# Patient Record
Sex: FEMALE | Race: WHITE | NOT HISPANIC OR LATINO
[De-identification: names, ages, dates, MRNs, and addresses within clinical notes are randomized per-mention and may not be internally consistent; named-entity substitution may affect disease eponyms.]

---

## 2017-01-06 ENCOUNTER — MEDICATION RENEWAL (OUTPATIENT)
Age: 67
End: 2017-01-06

## 2017-02-03 ENCOUNTER — MEDICATION RENEWAL (OUTPATIENT)
Age: 67
End: 2017-02-03

## 2017-03-01 ENCOUNTER — MEDICATION RENEWAL (OUTPATIENT)
Age: 67
End: 2017-03-01

## 2017-04-06 ENCOUNTER — MEDICATION RENEWAL (OUTPATIENT)
Age: 67
End: 2017-04-06

## 2017-04-11 ENCOUNTER — APPOINTMENT (OUTPATIENT)
Dept: INTERNAL MEDICINE | Facility: CLINIC | Age: 67
End: 2017-04-11

## 2017-04-18 ENCOUNTER — APPOINTMENT (OUTPATIENT)
Dept: INTERNAL MEDICINE | Facility: CLINIC | Age: 67
End: 2017-04-18

## 2017-04-18 ENCOUNTER — OTHER (OUTPATIENT)
Age: 67
End: 2017-04-18

## 2017-04-20 LAB
25(OH)D3 SERPL-MCNC: 31.8 NG/ML
ALBUMIN SERPL ELPH-MCNC: 4.5 G/DL
ALP BLD-CCNC: 106 U/L
ALT SERPL-CCNC: 17 U/L
ANION GAP SERPL CALC-SCNC: 19 MMOL/L
AST SERPL-CCNC: 19 U/L
BILIRUB SERPL-MCNC: 0.4 MG/DL
BUN SERPL-MCNC: 23 MG/DL
CALCIUM SERPL-MCNC: 10.1 MG/DL
CHLORIDE SERPL-SCNC: 100 MMOL/L
CHOLEST SERPL-MCNC: 245 MG/DL
CHOLEST/HDLC SERPL: 3.2 RATIO
CO2 SERPL-SCNC: 24 MMOL/L
CREAT SERPL-MCNC: 0.98 MG/DL
GLUCOSE SERPL-MCNC: 107 MG/DL
HBA1C MFR BLD HPLC: 6.1 %
HDLC SERPL-MCNC: 76 MG/DL
LDLC SERPL CALC-MCNC: 128 MG/DL
POTASSIUM SERPL-SCNC: 4 MMOL/L
PROT SERPL-MCNC: 7.1 G/DL
SODIUM SERPL-SCNC: 143 MMOL/L
TRIGL SERPL-MCNC: 205 MG/DL

## 2017-05-01 ENCOUNTER — RX RENEWAL (OUTPATIENT)
Age: 67
End: 2017-05-01

## 2017-05-03 ENCOUNTER — APPOINTMENT (OUTPATIENT)
Dept: INTERNAL MEDICINE | Facility: CLINIC | Age: 67
End: 2017-05-03

## 2017-05-05 ENCOUNTER — APPOINTMENT (OUTPATIENT)
Dept: INTERNAL MEDICINE | Facility: CLINIC | Age: 67
End: 2017-05-05

## 2017-05-05 DIAGNOSIS — M79.673 PAIN IN UNSPECIFIED FOOT: ICD-10-CM

## 2017-05-05 DIAGNOSIS — R13.10 DYSPHAGIA, UNSPECIFIED: ICD-10-CM

## 2017-05-05 DIAGNOSIS — Z86.59 PERSONAL HISTORY OF OTHER MENTAL AND BEHAVIORAL DISORDERS: ICD-10-CM

## 2017-05-05 DIAGNOSIS — M65.9 SYNOVITIS AND TENOSYNOVITIS, UNSPECIFIED: ICD-10-CM

## 2017-05-05 DIAGNOSIS — Z87.898 PERSONAL HISTORY OF OTHER SPECIFIED CONDITIONS: ICD-10-CM

## 2017-05-06 VITALS
SYSTOLIC BLOOD PRESSURE: 128 MMHG | WEIGHT: 163 LBS | DIASTOLIC BLOOD PRESSURE: 78 MMHG | HEART RATE: 78 BPM | RESPIRATION RATE: 14 BRPM | BODY MASS INDEX: 27.98 KG/M2

## 2017-05-23 ENCOUNTER — RX RENEWAL (OUTPATIENT)
Age: 67
End: 2017-05-23

## 2017-06-05 ENCOUNTER — APPOINTMENT (OUTPATIENT)
Dept: ENDOCRINOLOGY | Facility: CLINIC | Age: 67
End: 2017-06-05

## 2017-06-05 VITALS
SYSTOLIC BLOOD PRESSURE: 120 MMHG | OXYGEN SATURATION: 97 % | HEIGHT: 60.5 IN | BODY MASS INDEX: 30.6 KG/M2 | WEIGHT: 160 LBS | HEART RATE: 68 BPM | DIASTOLIC BLOOD PRESSURE: 80 MMHG

## 2017-06-05 VITALS — WEIGHT: 160 LBS | BODY MASS INDEX: 30.6 KG/M2 | HEIGHT: 60.5 IN

## 2017-06-26 ENCOUNTER — MEDICATION RENEWAL (OUTPATIENT)
Age: 67
End: 2017-06-26

## 2017-07-27 ENCOUNTER — MEDICATION RENEWAL (OUTPATIENT)
Age: 67
End: 2017-07-27

## 2017-09-01 ENCOUNTER — MEDICATION RENEWAL (OUTPATIENT)
Age: 67
End: 2017-09-01

## 2017-09-13 ENCOUNTER — APPOINTMENT (OUTPATIENT)
Dept: UROGYNECOLOGY | Facility: CLINIC | Age: 67
End: 2017-09-13
Payer: MEDICARE

## 2017-09-13 VITALS
DIASTOLIC BLOOD PRESSURE: 70 MMHG | BODY MASS INDEX: 27.31 KG/M2 | HEIGHT: 64 IN | SYSTOLIC BLOOD PRESSURE: 120 MMHG | WEIGHT: 160 LBS

## 2017-09-13 PROCEDURE — 51725 SIMPLE CYSTOMETROGRAM: CPT

## 2017-09-13 PROCEDURE — 81003 URINALYSIS AUTO W/O SCOPE: CPT | Mod: QW

## 2017-09-13 PROCEDURE — 99204 OFFICE O/P NEW MOD 45 MIN: CPT | Mod: 25

## 2017-09-27 ENCOUNTER — APPOINTMENT (OUTPATIENT)
Dept: UROGYNECOLOGY | Facility: CLINIC | Age: 67
End: 2017-09-27

## 2017-10-03 ENCOUNTER — APPOINTMENT (OUTPATIENT)
Dept: UROGYNECOLOGY | Facility: CLINIC | Age: 67
End: 2017-10-03

## 2017-10-18 ENCOUNTER — OUTPATIENT (OUTPATIENT)
Dept: OUTPATIENT SERVICES | Facility: HOSPITAL | Age: 67
LOS: 1 days | End: 2017-10-18
Payer: MEDICARE

## 2017-10-18 ENCOUNTER — APPOINTMENT (OUTPATIENT)
Dept: UROGYNECOLOGY | Facility: CLINIC | Age: 67
End: 2017-10-18
Payer: MEDICARE

## 2017-10-18 ENCOUNTER — RESULT CHARGE (OUTPATIENT)
Age: 67
End: 2017-10-18

## 2017-10-18 DIAGNOSIS — Z01.818 ENCOUNTER FOR OTHER PREPROCEDURAL EXAMINATION: ICD-10-CM

## 2017-10-18 LAB
BILIRUB UR QL STRIP: NORMAL
CLARITY UR: CLEAR
COLLECTION METHOD: NORMAL
GLUCOSE UR-MCNC: NORMAL
HCG UR QL: 1 EU/DL
HGB UR QL STRIP.AUTO: NORMAL
KETONES UR-MCNC: NORMAL
LEUKOCYTE ESTERASE UR QL STRIP: NORMAL
NITRITE UR QL STRIP: NORMAL
PH UR STRIP: 6
PROT UR STRIP-MCNC: NORMAL
SP GR UR STRIP: 1.01

## 2017-10-18 PROCEDURE — 51784 ANAL/URINARY MUSCLE STUDY: CPT

## 2017-10-18 PROCEDURE — 51797 INTRAABDOMINAL PRESSURE TEST: CPT

## 2017-10-18 PROCEDURE — 51784 ANAL/URINARY MUSCLE STUDY: CPT | Mod: 26

## 2017-10-18 PROCEDURE — 51797 INTRAABDOMINAL PRESSURE TEST: CPT | Mod: 26

## 2017-10-18 PROCEDURE — 51729 CYSTOMETROGRAM W/VP&UP: CPT

## 2017-10-18 PROCEDURE — 51729 CYSTOMETROGRAM W/VP&UP: CPT | Mod: 26

## 2017-10-19 ENCOUNTER — RESULT REVIEW (OUTPATIENT)
Age: 67
End: 2017-10-19

## 2017-10-20 DIAGNOSIS — N39.41 URGE INCONTINENCE: ICD-10-CM

## 2017-10-24 ENCOUNTER — APPOINTMENT (OUTPATIENT)
Dept: UROGYNECOLOGY | Facility: CLINIC | Age: 67
End: 2017-10-24
Payer: MEDICARE

## 2017-10-24 PROCEDURE — 99214 OFFICE O/P EST MOD 30 MIN: CPT

## 2017-10-27 ENCOUNTER — MEDICATION RENEWAL (OUTPATIENT)
Age: 67
End: 2017-10-27

## 2017-11-02 ENCOUNTER — RESULT REVIEW (OUTPATIENT)
Age: 67
End: 2017-11-02

## 2017-11-02 LAB
APPEARANCE: CLEAR
BACTERIA: NEGATIVE
BILIRUBIN URINE: NEGATIVE
BLOOD URINE: NEGATIVE
COLOR: YELLOW
GLUCOSE QUALITATIVE U: NEGATIVE MG/DL
HYALINE CASTS: 1 /LPF
KETONES URINE: NEGATIVE
LEUKOCYTE ESTERASE URINE: NEGATIVE
MICROSCOPIC-UA: NORMAL
NITRITE URINE: NEGATIVE
PH URINE: 6
PROTEIN URINE: NEGATIVE MG/DL
RED BLOOD CELLS URINE: 4 /HPF
SPECIFIC GRAVITY URINE: 1.02
SQUAMOUS EPITHELIAL CELLS: 1 /HPF
UROBILINOGEN URINE: 1 MG/DL
WHITE BLOOD CELLS URINE: 1 /HPF

## 2017-11-29 ENCOUNTER — MEDICATION RENEWAL (OUTPATIENT)
Age: 67
End: 2017-11-29

## 2017-12-06 ENCOUNTER — TRANSCRIPTION ENCOUNTER (OUTPATIENT)
Age: 67
End: 2017-12-06

## 2017-12-18 ENCOUNTER — APPOINTMENT (OUTPATIENT)
Dept: ENDOCRINOLOGY | Facility: CLINIC | Age: 67
End: 2017-12-18
Payer: MEDICARE

## 2017-12-18 VITALS
BODY MASS INDEX: 26.46 KG/M2 | SYSTOLIC BLOOD PRESSURE: 118 MMHG | HEART RATE: 95 BPM | HEIGHT: 64 IN | WEIGHT: 155 LBS | DIASTOLIC BLOOD PRESSURE: 80 MMHG | OXYGEN SATURATION: 98 %

## 2017-12-18 LAB
25(OH)D3 SERPL-MCNC: 30.9 NG/ML
ALBUMIN SERPL ELPH-MCNC: 4.1 G/DL
ALP BLD-CCNC: 115 U/L
ALT SERPL-CCNC: 10 U/L
ANION GAP SERPL CALC-SCNC: 14 MMOL/L
AST SERPL-CCNC: 20 U/L
BASOPHILS # BLD AUTO: 0.04 K/UL
BASOPHILS NFR BLD AUTO: 0.8 %
BILIRUB SERPL-MCNC: 0.2 MG/DL
BUN SERPL-MCNC: 25 MG/DL
CALCIUM SERPL-MCNC: 10 MG/DL
CALCIUM SERPL-MCNC: 10 MG/DL
CHLORIDE SERPL-SCNC: 99 MMOL/L
CHOLEST SERPL-MCNC: 228 MG/DL
CHOLEST/HDLC SERPL: 3.9 RATIO
CO2 SERPL-SCNC: 28 MMOL/L
CREAT SERPL-MCNC: 0.92 MG/DL
EOSINOPHIL # BLD AUTO: 0.13 K/UL
EOSINOPHIL NFR BLD AUTO: 2.5 %
GLUCOSE SERPL-MCNC: 112 MG/DL
HBA1C MFR BLD HPLC: 5.6 %
HCT VFR BLD CALC: 36.3 %
HDLC SERPL-MCNC: 59 MG/DL
HGB BLD-MCNC: 11.8 G/DL
IMM GRANULOCYTES NFR BLD AUTO: 0.2 %
LDLC SERPL CALC-MCNC: 120 MG/DL
LYMPHOCYTES # BLD AUTO: 1.44 K/UL
LYMPHOCYTES NFR BLD AUTO: 28.1 %
MAGNESIUM SERPL-MCNC: 1.9 MG/DL
MAN DIFF?: NORMAL
MCHC RBC-ENTMCNC: 28.3 PG
MCHC RBC-ENTMCNC: 32.5 GM/DL
MCV RBC AUTO: 87.1 FL
MONOCYTES # BLD AUTO: 0.28 K/UL
MONOCYTES NFR BLD AUTO: 5.5 %
NEUTROPHILS # BLD AUTO: 3.22 K/UL
NEUTROPHILS NFR BLD AUTO: 62.9 %
PARATHYROID HORMONE INTACT: 36 PG/ML
PHOSPHATE SERPL-MCNC: 3.5 MG/DL
PLATELET # BLD AUTO: 263 K/UL
POTASSIUM SERPL-SCNC: 4 MMOL/L
PROT SERPL-MCNC: 7 G/DL
RBC # BLD: 4.17 M/UL
RBC # FLD: 13.7 %
SODIUM SERPL-SCNC: 141 MMOL/L
T4 FREE SERPL-MCNC: 1.2 NG/DL
TRIGL SERPL-MCNC: 243 MG/DL
TSH SERPL-ACNC: 1.67 UIU/ML
WBC # FLD AUTO: 5.12 K/UL

## 2017-12-18 PROCEDURE — 96372 THER/PROPH/DIAG INJ SC/IM: CPT

## 2017-12-18 PROCEDURE — 99214 OFFICE O/P EST MOD 30 MIN: CPT | Mod: 25

## 2017-12-19 ENCOUNTER — MEDICATION RENEWAL (OUTPATIENT)
Age: 67
End: 2017-12-19

## 2018-01-20 ENCOUNTER — MEDICATION RENEWAL (OUTPATIENT)
Age: 68
End: 2018-01-20

## 2018-02-28 ENCOUNTER — MEDICATION RENEWAL (OUTPATIENT)
Age: 68
End: 2018-02-28

## 2018-03-29 ENCOUNTER — MEDICATION RENEWAL (OUTPATIENT)
Age: 68
End: 2018-03-29

## 2018-05-15 ENCOUNTER — MEDICATION RENEWAL (OUTPATIENT)
Age: 68
End: 2018-05-15

## 2018-05-25 ENCOUNTER — MEDICATION RENEWAL (OUTPATIENT)
Age: 68
End: 2018-05-25

## 2018-05-29 ENCOUNTER — RX RENEWAL (OUTPATIENT)
Age: 68
End: 2018-05-29

## 2018-05-30 ENCOUNTER — RX RENEWAL (OUTPATIENT)
Age: 68
End: 2018-05-30

## 2018-05-31 ENCOUNTER — RX RENEWAL (OUTPATIENT)
Age: 68
End: 2018-05-31

## 2018-06-25 ENCOUNTER — MEDICATION RENEWAL (OUTPATIENT)
Age: 68
End: 2018-06-25

## 2018-07-02 ENCOUNTER — LABORATORY RESULT (OUTPATIENT)
Age: 68
End: 2018-07-02

## 2018-07-02 ENCOUNTER — APPOINTMENT (OUTPATIENT)
Dept: ENDOCRINOLOGY | Facility: CLINIC | Age: 68
End: 2018-07-02
Payer: MEDICARE

## 2018-07-02 ENCOUNTER — MEDICATION RENEWAL (OUTPATIENT)
Age: 68
End: 2018-07-02

## 2018-07-02 VITALS
HEIGHT: 60 IN | SYSTOLIC BLOOD PRESSURE: 120 MMHG | OXYGEN SATURATION: 95 % | HEART RATE: 95 BPM | BODY MASS INDEX: 32.2 KG/M2 | DIASTOLIC BLOOD PRESSURE: 74 MMHG | WEIGHT: 164 LBS

## 2018-07-02 PROCEDURE — 96372 THER/PROPH/DIAG INJ SC/IM: CPT

## 2018-07-02 PROCEDURE — 99214 OFFICE O/P EST MOD 30 MIN: CPT | Mod: 25

## 2018-07-03 LAB
24R-OH-CALCIDIOL SERPL-MCNC: 37.3 PG/ML
25(OH)D3 SERPL-MCNC: 39.8 NG/ML
ALBUMIN SERPL ELPH-MCNC: 4.5 G/DL
ALP BLD-CCNC: 113 U/L
ALT SERPL-CCNC: 17 U/L
ANION GAP SERPL CALC-SCNC: 14 MMOL/L
AST SERPL-CCNC: 24 U/L
BASOPHILS # BLD AUTO: 0.04 K/UL
BASOPHILS NFR BLD AUTO: 0.8 %
BILIRUB SERPL-MCNC: 0.3 MG/DL
BUN SERPL-MCNC: 29 MG/DL
CALCIUM SERPL-MCNC: 10 MG/DL
CHLORIDE SERPL-SCNC: 98 MMOL/L
CHOLEST SERPL-MCNC: 222 MG/DL
CHOLEST/HDLC SERPL: 3.5 RATIO
CO2 SERPL-SCNC: 29 MMOL/L
CREAT SERPL-MCNC: 1.1 MG/DL
EOSINOPHIL # BLD AUTO: 0.15 K/UL
EOSINOPHIL NFR BLD AUTO: 2.9 %
GLUCOSE SERPL-MCNC: 96 MG/DL
HBA1C MFR BLD HPLC: 5.7 %
HCT VFR BLD CALC: 39.6 %
HDLC SERPL-MCNC: 64 MG/DL
HGB BLD-MCNC: 12.9 G/DL
IMM GRANULOCYTES NFR BLD AUTO: 0 %
LDLC SERPL CALC-MCNC: 130 MG/DL
LYMPHOCYTES # BLD AUTO: 1.44 K/UL
LYMPHOCYTES NFR BLD AUTO: 28 %
MAN DIFF?: NORMAL
MCHC RBC-ENTMCNC: 28.4 PG
MCHC RBC-ENTMCNC: 32.6 GM/DL
MCV RBC AUTO: 87 FL
MONOCYTES # BLD AUTO: 0.18 K/UL
MONOCYTES NFR BLD AUTO: 3.5 %
NEUTROPHILS # BLD AUTO: 3.34 K/UL
NEUTROPHILS NFR BLD AUTO: 64.8 %
PHOSPHATE SERPL-MCNC: 2.8 MG/DL
PLATELET # BLD AUTO: 260 K/UL
POTASSIUM SERPL-SCNC: 4.1 MMOL/L
PROT SERPL-MCNC: 7.7 G/DL
RBC # BLD: 4.55 M/UL
RBC # FLD: 13.8 %
SODIUM SERPL-SCNC: 141 MMOL/L
TRIGL SERPL-MCNC: 140 MG/DL
WBC # FLD AUTO: 5.15 K/UL

## 2018-07-09 LAB
CALCIUM SERPL-MCNC: 10 MG/DL
PARATHYROID HORMONE INTACT: 46 PG/ML
T4 FREE SERPL-MCNC: 1.4 NG/DL
TSH SERPL-ACNC: 2.26 UIU/ML

## 2018-08-04 ENCOUNTER — MEDICATION RENEWAL (OUTPATIENT)
Age: 68
End: 2018-08-04

## 2018-08-06 ENCOUNTER — RX RENEWAL (OUTPATIENT)
Age: 68
End: 2018-08-06

## 2018-08-31 ENCOUNTER — RX RENEWAL (OUTPATIENT)
Age: 68
End: 2018-08-31

## 2018-09-05 ENCOUNTER — OTHER (OUTPATIENT)
Age: 68
End: 2018-09-05

## 2018-09-06 ENCOUNTER — APPOINTMENT (OUTPATIENT)
Dept: INTERNAL MEDICINE | Facility: CLINIC | Age: 68
End: 2018-09-06
Payer: MEDICARE

## 2018-09-06 ENCOUNTER — INBOUND DOCUMENT (OUTPATIENT)
Age: 68
End: 2018-09-06

## 2018-09-06 VITALS
SYSTOLIC BLOOD PRESSURE: 136 MMHG | BODY MASS INDEX: 32.42 KG/M2 | WEIGHT: 166 LBS | DIASTOLIC BLOOD PRESSURE: 76 MMHG | HEART RATE: 78 BPM | RESPIRATION RATE: 14 BRPM

## 2018-09-06 DIAGNOSIS — R26.0 ATAXIC GAIT: ICD-10-CM

## 2018-09-06 PROCEDURE — G0444 DEPRESSION SCREEN ANNUAL: CPT | Mod: 59

## 2018-09-06 PROCEDURE — G0439: CPT

## 2018-09-06 NOTE — PHYSICAL EXAM

## 2018-09-06 NOTE — HEALTH RISK ASSESSMENT
[Very Good] : ~his/her~  mood as very good [] : No [Any fall with injury in past year] : Patient reported fall with injury in the past year [0] : 2) Feeling down, depressed, or hopeless: Not at all (0) [[id number]] : 0 [Change in mental status noted] : No change in mental status noted [Language] : denies difficulty with language [Behavior] : denies difficulty with behavior [Learning/Retaining New Information] : denies difficulty learning/retaining new information [Handling Complex Tasks] : denies difficulty handling complex tasks [Reasoning] : denies difficulty with reasoning [Spatial Ability and Orientation] : denies difficulty with spatial ability and orientation [None] : None [With Family] : lives with family [Retired] : retired [Feels Safe at Home] : Feels safe at home [Fully functional (bathing, dressing, toileting, transferring, walking, feeding)] : Fully functional (bathing, dressing, toileting, transferring, walking, feeding) [Fully functional (using the telephone, shopping, preparing meals, housekeeping, doing laundry, using] : Fully functional and needs no help or supervision to perform IADLs (using the telephone, shopping, preparing meals, housekeeping, doing laundry, using transportation, managing medications and managing finances) [Reports changes in hearing] : Reports no changes in hearing [Reports changes in vision] : Reports no changes in vision [Reports changes in dental health] : Reports no changes in dental health [Smoke Detector] : smoke detector [Seat Belt] :  uses seat belt

## 2018-09-06 NOTE — ASSESSMENT
[FreeTextEntry1] : Screening for depression done at this visit.  10 minutes spent in assessment and review.\par \par Patient made aware that I am asking my patients to transition to a pain management practice for future chronic pain medication needs.\par \par Declines referral for PT for gait training. \par \par Doing quite well overall

## 2018-09-06 NOTE — HISTORY OF PRESENT ILLNESS
[FreeTextEntry1] : Here for CPE.\par  [de-identified] : Generally feels well with no specific complaints.\par

## 2018-09-08 LAB
25(OH)D3 SERPL-MCNC: 43.2 NG/ML
ALBUMIN SERPL ELPH-MCNC: 4.7 G/DL
ALP BLD-CCNC: 107 U/L
ALT SERPL-CCNC: 20 U/L
ANION GAP SERPL CALC-SCNC: 13 MMOL/L
AST SERPL-CCNC: 25 U/L
BASOPHILS # BLD AUTO: 0.03 K/UL
BASOPHILS NFR BLD AUTO: 0.7 %
BILIRUB SERPL-MCNC: 0.4 MG/DL
BUN SERPL-MCNC: 21 MG/DL
CALCIUM SERPL-MCNC: 10 MG/DL
CHLORIDE SERPL-SCNC: 100 MMOL/L
CHOLEST SERPL-MCNC: 232 MG/DL
CHOLEST/HDLC SERPL: 3.8 RATIO
CO2 SERPL-SCNC: 29 MMOL/L
CREAT SERPL-MCNC: 1.01 MG/DL
EOSINOPHIL # BLD AUTO: 0.15 K/UL
EOSINOPHIL NFR BLD AUTO: 3.5 %
GLUCOSE SERPL-MCNC: 99 MG/DL
HBA1C MFR BLD HPLC: 5.8 %
HCT VFR BLD CALC: 39.9 %
HDLC SERPL-MCNC: 61 MG/DL
HGB BLD-MCNC: 13 G/DL
IMM GRANULOCYTES NFR BLD AUTO: 0 %
LDLC SERPL CALC-MCNC: 151 MG/DL
LYMPHOCYTES # BLD AUTO: 1.16 K/UL
LYMPHOCYTES NFR BLD AUTO: 26.7 %
MAGNESIUM SERPL-MCNC: 1.9 MG/DL
MAN DIFF?: NORMAL
MCHC RBC-ENTMCNC: 28.4 PG
MCHC RBC-ENTMCNC: 32.6 GM/DL
MCV RBC AUTO: 87.3 FL
MONOCYTES # BLD AUTO: 0.3 K/UL
MONOCYTES NFR BLD AUTO: 6.9 %
NEUTROPHILS # BLD AUTO: 2.7 K/UL
NEUTROPHILS NFR BLD AUTO: 62.2 %
PLATELET # BLD AUTO: 263 K/UL
POTASSIUM SERPL-SCNC: 4.1 MMOL/L
PROT SERPL-MCNC: 7.3 G/DL
RBC # BLD: 4.57 M/UL
RBC # FLD: 13.9 %
SODIUM SERPL-SCNC: 142 MMOL/L
TRIGL SERPL-MCNC: 98 MG/DL
WBC # FLD AUTO: 4.34 K/UL

## 2018-09-21 ENCOUNTER — OTHER (OUTPATIENT)
Age: 68
End: 2018-09-21

## 2018-10-09 ENCOUNTER — RX RENEWAL (OUTPATIENT)
Age: 68
End: 2018-10-09

## 2018-10-13 ENCOUNTER — OTHER (OUTPATIENT)
Age: 68
End: 2018-10-13

## 2018-11-24 ENCOUNTER — MEDICATION RENEWAL (OUTPATIENT)
Age: 68
End: 2018-11-24

## 2018-11-28 ENCOUNTER — RX RENEWAL (OUTPATIENT)
Age: 68
End: 2018-11-28

## 2019-01-04 ENCOUNTER — MEDICATION RENEWAL (OUTPATIENT)
Age: 69
End: 2019-01-04

## 2019-01-07 ENCOUNTER — APPOINTMENT (OUTPATIENT)
Dept: ENDOCRINOLOGY | Facility: CLINIC | Age: 69
End: 2019-01-07
Payer: MEDICARE

## 2019-01-07 VITALS — HEART RATE: 84 BPM | DIASTOLIC BLOOD PRESSURE: 72 MMHG | SYSTOLIC BLOOD PRESSURE: 110 MMHG | OXYGEN SATURATION: 96 %

## 2019-01-07 PROCEDURE — 99214 OFFICE O/P EST MOD 30 MIN: CPT | Mod: 25

## 2019-01-07 PROCEDURE — 76536 US EXAM OF HEAD AND NECK: CPT

## 2019-01-07 PROCEDURE — 96372 THER/PROPH/DIAG INJ SC/IM: CPT

## 2019-01-07 RX ORDER — MODAFINIL 100 MG/1
100 TABLET ORAL
Qty: 30 | Refills: 3 | Status: COMPLETED | COMMUNITY
Start: 2017-05-05 | End: 2019-01-07

## 2019-01-07 NOTE — PHYSICAL EXAM
[Alert] : alert [No Acute Distress] : no acute distress [Supple] : the neck was supple [No Thyroid Nodules] : there were no palpable thyroid nodules [No Respiratory Distress] : no respiratory distress [Normal Rate and Effort] : normal respiratory rhythm and effort [No Accessory Muscle Use] : no accessory muscle use [Clear to Auscultation] : lungs were clear to auscultation bilaterally [Normal Rate] : heart rate was normal  [Normal S1, S2] : normal S1 and S2 [Regular Rhythm] : with a regular rhythm [Normal Bowel Sounds] : normal bowel sounds [Not Tender] : non-tender [Soft] : abdomen soft [Not Distended] : not distended [Kyphosis] : kyphosis present [No Spinal Tenderness] : no spinal tenderness [No Stigmata of Cushings Syndrome] : no stigmata of cushings syndrome [Normal Reflexes] : deep tendon reflexes were 2+ and symmetric [No Tremors] : no tremors [Oriented x3] : oriented to person, place, and time [Normal Affect] : the affect was normal [Acanthosis Nigricans] : no acanthosis nigricans [de-identified] : walks with walker

## 2019-01-07 NOTE — DATA REVIEWED
[FreeTextEntry1] : Thyroid Ultrasound Report 1/7/19:\par \par Technique: multiple real time longitudinal and transverse images were obtained using a high resolution ultrasound with a linear transducer, Filter[employer] e 2008 model, 10-12 MHz frequencies. All measurements will be reported as longitudinal x sal-posterior x transverse. \par Comparison: Report dated 5/2016. \par \par Indication: thyroid nodule. \par \par Findings: \par The right thyroid lobe measures 3.66 x 1.33 x 1.64 cm. The left thyroid lobe measures 4.12 x 1.21 x 1.68 cm. The isthmus measures 0.25 cm. \par The right thyroid lobe has a heterogeneous parenchyma. \par The left thyroid lobe has a heterogeneous parenchyma . \par  \par In the right mid pole there is a  mixed, heterogenous nodule. It measures 1.33 x 0.77 x 1.17 cm. The nodule is ovoid in shape and the border is distinct. It has peripheral vascularity. \par \par In the right lower pole there is a  mixed. It measures 0.95 x 0.81 x 0.96 cm. The nodule is ovoid in shape and the border is distinct. It has peripheral vascularity. \par \par In the left mid pole there is a  mixed, heterogenous nodule. It measures 1.11 x 1.03 x 0.94 cm. The nodule is ovoid in shape and the border is distinct. It has peripheral and scant internal vascularity. \par \par In the left mid pole there is a  hypoechoic nodule. It measures 0.65 x 0.43 x 0.87 cm. The nodule is ovoid in shape and the border is smooth. The nodule does not have a halo. It demonstrates no calcifications. It has no vascularity. parathyroid adenoma posterior to R lobe 1.18 x 1.08 x 0.96 cm. \par \par In the right upper pole there is a  cyst. It measures 0.75 x 0.56 x 0.68 cm. The nodule is round in shape and the border is smooth. The nodule does not have a halo. It demonstrates no calcifications. It has no vascularity. \par \par Additional bilateral subcentimeter cysts and nodules too numerous to characterize. \par \par \par Labs 9/2018:\par CBC normal\par CMP normal\par A1c 5.8%\par Ca 10\par Corrected Ca 9.5\par \par HDL 61\par Choll 232\par Trig 98\par Vit D 43.2\par Mg 1.9\par \par Labs 7/2018:\par CBC normal\par A1c 5.7%\par CMP normal\par Ca 10\par Corrected 9.6\par PTH 46\par TSH 2.26\par Free T4 1.4\par Phos 2.8\par Vit D 39.8\par Vit D 1,25 37.3\par \par Labs 12/5/17:\par CBC normal\par Mg 1.9\par Glucose 112\par Ca 10\par PTH 36\par CMP otherwise normal\par Phos 3.5\par Chol 228\par Trig 243\par HDL 59\par \par TSH 1.67\par Free T4 1.2\par A1c 5.6%\par Vit D 30.9\par \par \par \par Labs 4/2017:\par Glucose 107\par \par HDL 76\par Chol 245\par Trig 205\par Vit D 31.8\par A1c 6.1%\par \par Labs 11/2016:\par TSH 1.26\par Free T4 1.3\par CBC normal\par A1c 6.0%\par CMP normal except glucose of 115\par \par \par FNA 5/31/16: Benign\par \par Labs 5/2/16:\par PTH 33\par Ca 10.2\par TSH 1.36\par Free T4 1.3\par \par Thyroid US performed 5/2/16:\par \par Indication: thyroid nodule. \par \par Findings: \par The right thyroid lobe measures 3.63 x 1.12 x 1.71 cm. The left thyroid lobe measures 3.73x 1.22 x 1.72 cm. The isthmus measures 0.29 cm. \par  \par In the right lower pole there is a  mixed, heterogenous nodule. It measures 1.29 x 0.71 x 1.1 cm. The nodule is ovoid in shape and the border is distinct. It has peripheral vascularity. \par \par In the right lower pole there is a  mixed. It measures 0.83 0.52 x 0.70 cm. The nodule is ovoid in shape and the border is distinct. It has peripheral vascularity. \par \par In the left lower pole there is a  mixed, heterogenous nodule. It measures 1.38 x 1.02 x 0.82 cm. The nodule is ovoid in shape and the border is distinct. It has peripheral and scant internal vascularity. \par \par parathyroid adenoma posterior to R lobe 1.18 x 1.08 x 0.96 cm. \par \par DEXA performed May 2,2016\par spine not performed due to surgery\par Tot Hip -2.2 (+4.9% change from 2013)\par Fem Neck -2.3 Z(-5.2 % change from 2013)\par Proximal radius -3.4 (-11.3% change from 2013)\par \par DEXA performed September 10, 2013\par spine not performed due to surgery\par Total Hipt -2.5, osteoporosis, stable versus prior study of 2011 (-2.7)\par femoral neck -2.0, osteopenia, stable versus 2011 (-2.3)\par Proximal radius -2.4, osteopenia, no prior

## 2019-01-07 NOTE — IMPRESSION
[FreeTextEntry1] : Multinodular Goiter with nodules as described above stable compared to 5/2016. [FreeTextEntry2] : Repeat thyroid US in 1-2 years.

## 2019-01-07 NOTE — DATA REVIEWED
[FreeTextEntry1] : Thyroid Ultrasound Report 1/7/19:\par \par Technique: multiple real time longitudinal and transverse images were obtained using a high resolution ultrasound with a linear transducer, Resolver e 2008 model, 10-12 MHz frequencies. All measurements will be reported as longitudinal x sal-posterior x transverse. \par Comparison: Report dated 5/2016. \par \par Indication: thyroid nodule. \par \par Findings: \par The right thyroid lobe measures 3.66 x 1.33 x 1.64 cm. The left thyroid lobe measures 4.12 x 1.21 x 1.68 cm. The isthmus measures 0.25 cm. \par The right thyroid lobe has a heterogeneous parenchyma. \par The left thyroid lobe has a heterogeneous parenchyma . \par  \par In the right mid pole there is a  mixed, heterogenous nodule. It measures 1.33 x 0.77 x 1.17 cm. The nodule is ovoid in shape and the border is distinct. It has peripheral vascularity. \par \par In the right lower pole there is a  mixed. It measures 0.95 x 0.81 x 0.96 cm. The nodule is ovoid in shape and the border is distinct. It has peripheral vascularity. \par \par In the left mid pole there is a  mixed, heterogenous nodule. It measures 1.11 x 1.03 x 0.94 cm. The nodule is ovoid in shape and the border is distinct. It has peripheral and scant internal vascularity. \par \par In the left mid pole there is a  hypoechoic nodule. It measures 0.65 x 0.43 x 0.87 cm. The nodule is ovoid in shape and the border is smooth. The nodule does not have a halo. It demonstrates no calcifications. It has no vascularity. parathyroid adenoma posterior to R lobe 1.18 x 1.08 x 0.96 cm. \par \par In the right upper pole there is a  cyst. It measures 0.75 x 0.56 x 0.68 cm. The nodule is round in shape and the border is smooth. The nodule does not have a halo. It demonstrates no calcifications. It has no vascularity. \par \par Additional bilateral subcentimeter cysts and nodules too numerous to characterize. \par \par \par Labs 9/2018:\par CBC normal\par CMP normal\par A1c 5.8%\par Ca 10\par Corrected Ca 9.5\par \par HDL 61\par Choll 232\par Trig 98\par Vit D 43.2\par Mg 1.9\par \par Labs 7/2018:\par CBC normal\par A1c 5.7%\par CMP normal\par Ca 10\par Corrected 9.6\par PTH 46\par TSH 2.26\par Free T4 1.4\par Phos 2.8\par Vit D 39.8\par Vit D 1,25 37.3\par \par Labs 12/5/17:\par CBC normal\par Mg 1.9\par Glucose 112\par Ca 10\par PTH 36\par CMP otherwise normal\par Phos 3.5\par Chol 228\par Trig 243\par HDL 59\par \par TSH 1.67\par Free T4 1.2\par A1c 5.6%\par Vit D 30.9\par \par \par \par Labs 4/2017:\par Glucose 107\par \par HDL 76\par Chol 245\par Trig 205\par Vit D 31.8\par A1c 6.1%\par \par Labs 11/2016:\par TSH 1.26\par Free T4 1.3\par CBC normal\par A1c 6.0%\par CMP normal except glucose of 115\par \par \par FNA 5/31/16: Benign\par \par Labs 5/2/16:\par PTH 33\par Ca 10.2\par TSH 1.36\par Free T4 1.3\par \par Thyroid US performed 5/2/16:\par \par Indication: thyroid nodule. \par \par Findings: \par The right thyroid lobe measures 3.63 x 1.12 x 1.71 cm. The left thyroid lobe measures 3.73x 1.22 x 1.72 cm. The isthmus measures 0.29 cm. \par  \par In the right lower pole there is a  mixed, heterogenous nodule. It measures 1.29 x 0.71 x 1.1 cm. The nodule is ovoid in shape and the border is distinct. It has peripheral vascularity. \par \par In the right lower pole there is a  mixed. It measures 0.83 0.52 x 0.70 cm. The nodule is ovoid in shape and the border is distinct. It has peripheral vascularity. \par \par In the left lower pole there is a  mixed, heterogenous nodule. It measures 1.38 x 1.02 x 0.82 cm. The nodule is ovoid in shape and the border is distinct. It has peripheral and scant internal vascularity. \par \par parathyroid adenoma posterior to R lobe 1.18 x 1.08 x 0.96 cm. \par \par DEXA performed May 2,2016\par spine not performed due to surgery\par Tot Hip -2.2 (+4.9% change from 2013)\par Fem Neck -2.3 Z(-5.2 % change from 2013)\par Proximal radius -3.4 (-11.3% change from 2013)\par \par DEXA performed September 10, 2013\par spine not performed due to surgery\par Total Hipt -2.5, osteoporosis, stable versus prior study of 2011 (-2.7)\par femoral neck -2.0, osteopenia, stable versus 2011 (-2.3)\par Proximal radius -2.4, osteopenia, no prior

## 2019-01-07 NOTE — REVIEW OF SYSTEMS
[Fatigue] : fatigue [Recent Weight Loss (___ Lbs)] : recent [unfilled] ~Ulb weight loss [Constipation] : constipation [Joint Pain] : joint pain [Back Pain] : back pain [Tremors] : tremors [All other systems negative] : All other systems negative [Recent Weight Gain (___ Lbs)] : no recent weight gain [Fever] : no fever [Chills] : no chills [Blurry Vision] : no blurred vision [Dysphagia] : no dysphagia [Dysphonia] : no dysphonia [Palpitations] : no palpitations [Shortness Of Breath] : no shortness of breath [Nausea] : no nausea [Vomiting] : no vomiting was observed [Cold Intolerance] : cold tolerant [Heat Intolerance] : heat tolerant [FreeTextEntry9] : +arthritis

## 2019-01-07 NOTE — HISTORY OF PRESENT ILLNESS
[Prolia (Denosumab)] : Prolia (Denosumab) [Premat. Menopause (surg)] : premature menopause which occurred surgically [High Fall Risk] : high fall risk [Frequent Falls] : frequent falls [Uses a Walker/Cane] : use of a walker/cane [Regular Dental Follow-Up] : regular dental follow-up [History of Blood Clots] : history of blood clots [FreeTextEntry1] : 68-year-old F with PMH osteoporosis, thyroid nodules, prediabetes presents for follow up.\par \par Osteoporosis: Seen in 2013 for osteoporosis. Told of osteopenia approximately in 2005. Was treated from [id number] with Fosamax and then stopped due to esophagitis. DXA 2013 stable - drug holiday continued. No history of fractures. Mother did not have hip fx. Patient smoked 1-2 PPD x 20 yrs - quit 1982. No prolonged steroid use. No recent steroids. Premature menopause at age 33 s/p RAJIV for fibroids. She has a history of several orthopedic procedures including spinal fusion 1966 and multiple further spinal surgeries. History of left hip replacement 2010 and right shoulder replacement 2012 for arthritis.  Taking Vit D 2000 IU QD. Now taking calcium. Seen initially by me 5/2/16 with repeat DXA with significant decline in BMD of 1/3 radius from -2.4 in 2013 to -3.4. PTH not elevated, but parathyroid adenoma possible visualized on thyroid U/S. Recommended prolia s/p 1st injection 5/24/16, 2nd injection 11/2016. 3rd injection 6/2017. 4th injection 12/18/17. 5th injection 7/2/18. Here for 6th injection. No recent fractures, but has fallen since last visit. Follows with dental. No upcoming dental work. Has hx of dental implants. Last DXA 6/2017 total spine -2.1, 1/3 radius -3.1, Hip -2.1. Next DXA scheduled for 6/2019.\par \par B/L Thyroid Nodules: Outpatient Thyroid US performed 3/2016 revealed B/L thyroid nodules, with heterogenous, hypoechoic solid nodule of the L midpole measuring 1.5 x 1.1 x 0.8cm;  heterogenous, hypoechoic solid nodule of the R mid-lower pole measuring 1.3 x 0.8 x 1.1 cm; and  heterogenous, hypoechoic solid nodule of the RLL measuring 1.3 x 1.4 x 1.0 cm.  Nodules were found incidentally on work up for falls. No personal h/o radiation to the head or neck. No h/o abnormal thyroid blood work. No voice changes. + dysphagia after paraesophageal hernia repair. Father had thyroid nodule - unknown if malignant. Seen initially 5/2016 with thyroid U/S confirming dominant left thyroid nodule. Recommended FNA performed 5/2016 with benign results. Patient reports occasional hand tremors. Denies palpitations. Denies heat or cold intolerance. +weight loss and constipation from methadone. No h/o of amiodarone or lithium use. No changes to the neck. \par \par On chronic methadone for restless leg syndrome [Taking Steroids] : no past or present history of taking steroids [Kidney Stones] : no history of kidney stones [Excessive Alcohol Intake] : no past or present history of excessive alcohol intake [Current Smoking___(ppd)] : not currently smoking [Family History of Osteoporosis] : no family history of osteoporosis [Family History of Breast Cancer] : no family history of breast cancer [Family History of Hip Fracture] : no family history of hip fracture [Hyperparathyroidism] : no hyperparathyroidism [History of Radiation Therapy] : no history of radiation therapy [Previous Fragility Fracture] : no previous fragility fracture

## 2019-01-07 NOTE — HISTORY OF PRESENT ILLNESS
[Prolia (Denosumab)] : Prolia (Denosumab) [Premat. Menopause (surg)] : premature menopause which occurred surgically [High Fall Risk] : high fall risk [Frequent Falls] : frequent falls [Uses a Walker/Cane] : use of a walker/cane [Regular Dental Follow-Up] : regular dental follow-up [History of Blood Clots] : history of blood clots [FreeTextEntry1] : 68-year-old F with PMH osteoporosis, thyroid nodules, prediabetes presents for follow up.\par \par Osteoporosis: Seen in 2013 for osteoporosis. Told of osteopenia approximately in 2005. Was treated from [id number] with Fosamax and then stopped due to esophagitis. DXA 2013 stable - drug holiday continued. No history of fractures. Mother did not have hip fx. Patient smoked 1-2 PPD x 20 yrs - quit 1982. No prolonged steroid use. No recent steroids. Premature menopause at age 33 s/p RAJIV for fibroids. She has a history of several orthopedic procedures including spinal fusion 1966 and multiple further spinal surgeries. History of left hip replacement 2010 and right shoulder replacement 2012 for arthritis.  Taking Vit D 2000 IU QD. Now taking calcium. Seen initially by me 5/2/16 with repeat DXA with significant decline in BMD of 1/3 radius from -2.4 in 2013 to -3.4. PTH not elevated, but parathyroid adenoma possible visualized on thyroid U/S. Recommended prolia s/p 1st injection 5/24/16, 2nd injection 11/2016. 3rd injection 6/2017. 4th injection 12/18/17. 5th injection 7/2/18. Here for 6th injection. No recent fractures, but has fallen since last visit. Follows with dental. No upcoming dental work. Has hx of dental implants. Last DXA 6/2017 total spine -2.1, 1/3 radius -3.1, Hip -2.1. Next DXA scheduled for 6/2019.\par \par B/L Thyroid Nodules: Outpatient Thyroid US performed 3/2016 revealed B/L thyroid nodules, with heterogenous, hypoechoic solid nodule of the L midpole measuring 1.5 x 1.1 x 0.8cm;  heterogenous, hypoechoic solid nodule of the R mid-lower pole measuring 1.3 x 0.8 x 1.1 cm; and  heterogenous, hypoechoic solid nodule of the RLL measuring 1.3 x 1.4 x 1.0 cm.  Nodules were found incidentally on work up for falls. No personal h/o radiation to the head or neck. No h/o abnormal thyroid blood work. No voice changes. + dysphagia after paraesophageal hernia repair. Father had thyroid nodule - unknown if malignant. Seen initially 5/2016 with thyroid U/S confirming dominant left thyroid nodule. Recommended FNA performed 5/2016 with benign results. Patient reports occasional hand tremors. Denies palpitations. Denies heat or cold intolerance. +weight loss and constipation from methadone. No h/o of amiodarone or lithium use. No changes to the neck. \par \par On chronic methadone for restless leg syndrome [Taking Steroids] : no past or present history of taking steroids [Kidney Stones] : no history of kidney stones [Excessive Alcohol Intake] : no past or present history of excessive alcohol intake [Current Smoking___(ppd)] : not currently smoking [Family History of Osteoporosis] : no family history of osteoporosis [Family History of Breast Cancer] : no family history of breast cancer [Family History of Hip Fracture] : no family history of hip fracture [Hyperparathyroidism] : no hyperparathyroidism [History of Radiation Therapy] : no history of radiation therapy [Previous Fragility Fracture] : no previous fragility fracture

## 2019-01-07 NOTE — PROCEDURE
[[email or web address] e 2008 model, 10-12 MHz frequencies] : multiple real time longitudinal and transverse images were obtained using a high resolution ultrasound with a linear transducer, [email or web address] e 2008 model, 10-12 MHz frequencies. All measurements will be reported as longitudinal x sal-posterior x transverse. [Report dated ___] : Report dated [unfilled] [Thyroid Nodule] : thyroid nodule [] : a heterogeneous parenchyma  [Lower] : lower pole there is a  [Peripheral vascularity] : peripheral vascularity [Mixed] : mixed [Heterogeneous] : heterogenous nodule [Distinct] : distinct [Peripheral and scant  internal vascularity] : peripheral and scant internal vascularity [Left Thyroid] : left [Mid] : mid pole there is a  [Hypoechoic] : hypoechoic nodule [Ovoid] : ovoid in shape [Right Thyroid] : right [Upper] : upper pole there is a  [Cyst] : cyst [Round] : round in shape [Smooth] : smooth [No] : does not have a halo [No calcifications] : no calcifications [No vascularity] : no vascularity [FreeTextEntry1] : 3.66 x 1.33 x 1.64 [FreeTextEntry5] : 4.12 x 1.21 x 1.68 [FreeTextEntry2] : 0.25 [FreeTextEntry4] : parathyroid adenoma posterior to R lobe 1.18 x 1.08 x 0.96 cm [FreeTextEntry3] : 0.75 x 0.56 x 0.68 [de-identified] : Additional bilateral subcentimeter cysts and nodules too numerous to characterize.

## 2019-01-07 NOTE — RESULTS/DATA
[Hologic] : hologic [T-Score ___] : T-score: [unfilled] [FreeTextEntry2] : 5/2/16 [FreeTextEntry1] : 6/5/17: Total Hip -2.1; Neck -2.0, 1/3 radius -3.1\par

## 2019-01-07 NOTE — PROCEDURE
[[employer] e 2008 model, 10-12 MHz frequencies] : multiple real time longitudinal and transverse images were obtained using a high resolution ultrasound with a linear transducer, Med[employer] e 2008 model, 10-12 MHz frequencies. All measurements will be reported as longitudinal x sal-posterior x transverse. [Report dated ___] : Report dated [unfilled] [Thyroid Nodule] : thyroid nodule [] : a heterogeneous parenchyma  [Lower] : lower pole there is a  [Peripheral vascularity] : peripheral vascularity [Mixed] : mixed [Heterogeneous] : heterogenous nodule [Distinct] : distinct [Peripheral and scant  internal vascularity] : peripheral and scant internal vascularity [Left Thyroid] : left [Mid] : mid pole there is a  [Hypoechoic] : hypoechoic nodule [Ovoid] : ovoid in shape [Right Thyroid] : right [Upper] : upper pole there is a  [Cyst] : cyst [Round] : round in shape [Smooth] : smooth [No] : does not have a halo [No calcifications] : no calcifications [No vascularity] : no vascularity [FreeTextEntry1] : 3.66 x 1.33 x 1.64 [FreeTextEntry5] : 4.12 x 1.21 x 1.68 [FreeTextEntry2] : 0.25 [FreeTextEntry4] : parathyroid adenoma posterior to R lobe 1.18 x 1.08 x 0.96 cm [FreeTextEntry3] : 0.75 x 0.56 x 0.68 [de-identified] : Additional bilateral subcentimeter cysts and nodules too numerous to characterize.

## 2019-01-07 NOTE — ASSESSMENT
[Carbohydrate Consistent Diet] : carbohydrate consistent diet [Importance of Diet and Exercise] : importance of diet and exercise to improve glycemic control, achieve weight loss and improve cardiovascular health [FreeTextEntry1] : 68-year-old F w/\par \par 1. B/L Thyroid nodules - Thyroid FNA of Left nodule (please refer to official US report) benign. Can repeat thyroid U/S today stable compared to 2016 (see results section for full description). Clinically and chemically euthyroid. \par \par 2. Osteoporosis -  DXA repeated 6/2017 with results as noted above with stable/mild improvement in proximal radius T score of -3.1 from -3.4 (-11.3% change from 2013). s/p drug holiday for ~ 7 years. Started on prolia 1st dose 5/24/16. 2nd dose 11/29/16. 3rd Prolia 6/2017. 4th dose 12/2017. 5th dose 7/2018. 6th dose of Prolia given today (BUY AND BILL). Next injection 7/2019. Next DXA prior to next injection. \par \par 3. Prediabetes- A1c 5.8%. Lifestyle modification advised. \par

## 2019-01-07 NOTE — PHYSICAL EXAM
[Alert] : alert [No Acute Distress] : no acute distress [Supple] : the neck was supple [No Thyroid Nodules] : there were no palpable thyroid nodules [No Respiratory Distress] : no respiratory distress [Normal Rate and Effort] : normal respiratory rhythm and effort [No Accessory Muscle Use] : no accessory muscle use [Clear to Auscultation] : lungs were clear to auscultation bilaterally [Normal Rate] : heart rate was normal  [Normal S1, S2] : normal S1 and S2 [Regular Rhythm] : with a regular rhythm [Normal Bowel Sounds] : normal bowel sounds [Not Tender] : non-tender [Soft] : abdomen soft [Not Distended] : not distended [Kyphosis] : kyphosis present [No Spinal Tenderness] : no spinal tenderness [No Stigmata of Cushings Syndrome] : no stigmata of cushings syndrome [Normal Reflexes] : deep tendon reflexes were 2+ and symmetric [No Tremors] : no tremors [Oriented x3] : oriented to person, place, and time [Normal Affect] : the affect was normal [Acanthosis Nigricans] : no acanthosis nigricans [de-identified] : walks with walker

## 2019-01-10 ENCOUNTER — MEDICATION RENEWAL (OUTPATIENT)
Age: 69
End: 2019-01-10

## 2019-02-18 ENCOUNTER — MEDICATION RENEWAL (OUTPATIENT)
Age: 69
End: 2019-02-18

## 2019-03-02 ENCOUNTER — TRANSCRIPTION ENCOUNTER (OUTPATIENT)
Age: 69
End: 2019-03-02

## 2019-03-21 ENCOUNTER — MEDICATION RENEWAL (OUTPATIENT)
Age: 69
End: 2019-03-21

## 2019-03-29 ENCOUNTER — APPOINTMENT (OUTPATIENT)
Dept: INTERNAL MEDICINE | Facility: CLINIC | Age: 69
End: 2019-03-29
Payer: MEDICARE

## 2019-03-29 PROCEDURE — 90662 IIV NO PRSV INCREASED AG IM: CPT

## 2019-03-29 PROCEDURE — 99213 OFFICE O/P EST LOW 20 MIN: CPT | Mod: 25

## 2019-03-29 PROCEDURE — G0008: CPT

## 2019-03-30 VITALS — SYSTOLIC BLOOD PRESSURE: 110 MMHG | HEART RATE: 74 BPM | RESPIRATION RATE: 14 BRPM | DIASTOLIC BLOOD PRESSURE: 70 MMHG

## 2019-03-30 LAB
CHOLEST SERPL-MCNC: 235 MG/DL
CHOLEST/HDLC SERPL: 3.4 RATIO
HDLC SERPL-MCNC: 69 MG/DL
LDLC SERPL CALC-MCNC: 149 MG/DL
TRIGL SERPL-MCNC: 85 MG/DL

## 2019-03-30 NOTE — HISTORY OF PRESENT ILLNESS
[Stable] : stable [Patient LDL Goal ___] : the patient's LDL goal is [unfilled] mg/dL [CAD] : no coronary artery disease [Diabetes Mellitus] : no diabetes mellitus [PVD] : no peripheral vascular disease [Carotid Disease] : no carotid disease [AAA] : no abdominal aortic aneurysm [Hypertension] : no hypertension [Smoking] : does not smoke [Low HDL] : HDL cholesterol level not low [FHx Premature CAD] : no family history of CAD before age 55 [None] : The patient is not currently on any medications for ~his/her~ coronary artery disease [Due For:] : the patient is due for [Lipid Panel] : a lipid panel

## 2019-03-30 NOTE — PHYSICAL EXAM
[No Acute Distress] : no acute distress [Clear to Auscultation] : lungs were clear to auscultation bilaterally [Regular Rhythm] : with a regular rhythm [Normal S1, S2] : normal S1 and S2 [Soft] : abdomen soft [Non Tender] : non-tender [No HSM] : no HSM

## 2019-05-13 ENCOUNTER — RX RENEWAL (OUTPATIENT)
Age: 69
End: 2019-05-13

## 2019-06-04 ENCOUNTER — RX RENEWAL (OUTPATIENT)
Age: 69
End: 2019-06-04

## 2019-06-23 ENCOUNTER — RX RENEWAL (OUTPATIENT)
Age: 69
End: 2019-06-23

## 2019-06-26 ENCOUNTER — APPOINTMENT (OUTPATIENT)
Dept: ORTHOPEDIC SURGERY | Facility: CLINIC | Age: 69
End: 2019-06-26
Payer: MEDICARE

## 2019-06-26 VITALS
SYSTOLIC BLOOD PRESSURE: 111 MMHG | HEIGHT: 60 IN | WEIGHT: 155 LBS | DIASTOLIC BLOOD PRESSURE: 69 MMHG | BODY MASS INDEX: 30.43 KG/M2 | HEART RATE: 80 BPM

## 2019-06-26 DIAGNOSIS — Z86.711 PERSONAL HISTORY OF PULMONARY EMBOLISM: ICD-10-CM

## 2019-06-26 DIAGNOSIS — Z87.39 PERSONAL HISTORY OF OTHER DISEASES OF THE MUSCULOSKELETAL SYSTEM AND CONNECTIVE TISSUE: ICD-10-CM

## 2019-06-26 PROCEDURE — 99203 OFFICE O/P NEW LOW 30 MIN: CPT

## 2019-06-26 PROCEDURE — 73560 X-RAY EXAM OF KNEE 1 OR 2: CPT | Mod: RT

## 2019-07-03 ENCOUNTER — APPOINTMENT (OUTPATIENT)
Dept: ORTHOPEDIC SURGERY | Facility: CLINIC | Age: 69
End: 2019-07-03
Payer: MEDICARE

## 2019-07-03 PROCEDURE — 20610 DRAIN/INJ JOINT/BURSA W/O US: CPT | Mod: RT

## 2019-07-05 NOTE — PHYSICAL EXAM
[de-identified] : Right Knee: \par Range of Motion in Degrees	\par 	                  Claimant:	Normal:	\par Flexion Active	  135 	                135-degrees	\par Flexion Passive	  135	                135-degrees	\par Extension Active	  0-5	                0-5-degrees	\par Extension Passive	  0-5	                0-5-degrees	\par \par No weakness to flexion/extension.  No evidence of instability in the AP plane or varus or valgus stress.  Negative  Lachman.  Negative pivot shift.  Negative anterior drawer test.  Negative posterior drawer test.  Negative Tammy.  Negative Apley grind.  No medial or lateral joint line tenderness.  Positive tenderness over the medial and lateral facet of the patella.  Positive patellofemoral crepitations.  No lateral tilting patella.  No patella apprehension.  Positive crepitation in the medial and lateral femoral condyle.  No proximal or distal swelling, edema or tenderness.  No gross motor or sensory deficits.  Mild intra-articular swelling.  2+ DP and PT pulses.  No varus or valgus malalignment.  Skin is intact.  No rashes, scars or lesions.  \par  [de-identified] : Ambulating with a slightly antalgic to antalgic gait.  Station:  Normal.  [de-identified] : Appearance:  Well-developed, well-nourished female in no acute distress.\par \par   [de-identified] : Radiographs, two views of the right knee, reveals severe bone-on-bone arthritis.

## 2019-07-05 NOTE — DISCUSSION/SUMMARY
[de-identified] : At this time, due to osteoarthritis of right knee, we will order visco injections for the right knee.  She will return to the office next week.

## 2019-07-05 NOTE — ADDENDUM
[FreeTextEntry1] : This note was dictated by Lorenza Sarmiento, OTR/L, PA \par \par This note was written by Evi Lacy on 07/02/2019 acting as a scribe for BOBBI KLINE

## 2019-07-05 NOTE — HISTORY OF PRESENT ILLNESS
[5] : the ailment interference is 5/10 [10  (interferes completely)] : the ailment interference is10/10 (interferes completely) [7] : the ailment interference is 7/10 [de-identified] : The patient comes in today with complaints of right knee pain.  The patient states the onset/injury occurred in 2012.  This injury is not work related or due to an automobile accident.  The patient states the pain is almost constant and localized.  The patient describes the pain as sharp, achy and stabbing.  The patient notes rest and ice make her symptoms better, while sleep, bending and walking makes her symptoms worse.  The patient indicates pain is at a level of 4 on a pain scale of 0-10.  [] : No [de-identified] : Methadone (for RLS) [de-identified] : The patient states, "Problems with my knee, back and balance have caused me to fall occasionally"

## 2019-07-09 ENCOUNTER — APPOINTMENT (OUTPATIENT)
Dept: ENDOCRINOLOGY | Facility: CLINIC | Age: 69
End: 2019-07-09

## 2019-07-10 ENCOUNTER — APPOINTMENT (OUTPATIENT)
Dept: ORTHOPEDIC SURGERY | Facility: CLINIC | Age: 69
End: 2019-07-10
Payer: MEDICARE

## 2019-07-10 PROCEDURE — 20610 DRAIN/INJ JOINT/BURSA W/O US: CPT | Mod: RT

## 2019-07-16 NOTE — PROCEDURE
[de-identified] : VIRGINIA HUFFMAN comes in today for the second Supartz injection to the right knee.  \par \par Physical Examination:\par Right Knee:\par Knee: Range of Motion in Degrees  	\par    	                        Claimant:  	            Normal:  	\par Flexion Active   	         135     	            135-degrees  	\par Flexion Passive  	         135  	            135-degrees  	\par Extension Active  	         0-5  	            0-5-degrees  	\par Extension Passive            0-5  	            0-5-degrees  	\par \par No evidence of instability in the AP plane or varus or valgus stress.  Negative  Lachman. Negative pivot shift.  Negative anterior drawer test.  Negative posterior drawer test. Negative Tammy.  Negative Apley grind.  No medial or lateral joint line tenderness. Positive tenderness over the medial and lateral facet of the patella.  Positive patellofemoral crepitations.  No lateral tilting patella.  No patellar apprehension. Positive crepitation in the medial and lateral femoral condyle.  No proximal or distal swelling, edema or tenderness.  No gross motor or sensory deficits.  Mild intra-articular swelling.  No varus or valgus malalignment.  2+ DP and PT pulses.  Skin is intact.  No rashes, scars or lesions.  \par \par Impression: \par Osteoarthritis of the right knee\par \par Consent:\par The risks and benefits of the procedure were discussed with the patient in detail.  Upon verbal consent of the patient, we proceeded with the Supartz injection as noted below.  \par \par Procedure:\par After sterile prep, the patient underwent a Supartz injection of 25 mg of Sodium Hyaluronate in a 2ML syringe into the right knee.  The patient tolerated the procedure well.  There were no complications.  \par \par NDC#: [address]-9\par Lot #:  [id number]                  \par Expiration: 4/30/22\par \par Plan:\par I have recommended ice and elevation.  The patient will be reassessed in one week for the next Supartz injection for the right knee osteoarthritis.   \par \par

## 2019-07-16 NOTE — ADDENDUM
[FreeTextEntry1] : This note was written by Patricia Brantley on 07/16/2019 acting as scribe for Lorenza Sarmiento, OTR/L, PA.\par  \par

## 2019-07-17 NOTE — ADDENDUM
[FreeTextEntry1] : This note was written by Patricia Brantley on 07/16/2019 acting as scribe for Abhijit South III, MD\par

## 2019-07-17 NOTE — PROCEDURE
[de-identified] : VIRGINIA HUFFMAN comes in today for the first Supartz injection to the right knee.  \par \par Physical Examination:\par Right Knee:\par Knee: Range of Motion in Degrees  	\par    	                        Claimant:  	            Normal:  	\par Flexion Active   	         135     	            135-degrees  	\par Flexion Passive  	         135  	            135-degrees  	\par Extension Active  	         0-5  	            0-5-degrees  	\par Extension Passive            0-5  	            0-5-degrees  	\par \par No evidence of instability in the AP plane or varus or valgus stress.  Negative  Lachman. Negative pivot shift.  Negative anterior drawer test.  Negative posterior drawer test. Negative Tammy.  Negative Apley grind.  No medial or lateral joint line tenderness. Positive tenderness over the medial and lateral facet of the patella.  Positive patellofemoral crepitations.  No lateral tilting patella.  No patellar apprehension. Positive crepitation in the medial and lateral femoral condyle.  No proximal or distal swelling, edema or tenderness.  No gross motor or sensory deficits.  Mild intra-articular swelling.  No varus or valgus malalignment.  2+ DP and PT pulses.  Skin is intact.  No rashes, scars or lesions.  \par \par Impression: \par Osteoarthritis of the right knee\par \par Consent:\par The risks and benefits of the procedure were discussed with the patient in detail.  Upon verbal consent of the patient, we proceeded with the Supartz injection as noted below.  \par \par Procedure:\par After sterile prep, the patient underwent a Supartz injection of 25 mg of Sodium Hyaluronate in a 2ML syringe into the right knee.  The patient tolerated the procedure well.  There were no complications.  \par \par NDC#: [address]-8\par Lot #:  [id number]                  \par Expiration: 4/30/22\par \par Plan:\par I have recommended ice and elevation.  The patient will be reassessed in one week for the next Supartz injection for the right knee osteoarthritis.   \par \par

## 2019-07-23 ENCOUNTER — APPOINTMENT (OUTPATIENT)
Dept: ORTHOPEDIC SURGERY | Facility: CLINIC | Age: 69
End: 2019-07-23
Payer: MEDICARE

## 2019-07-23 PROCEDURE — 20610 DRAIN/INJ JOINT/BURSA W/O US: CPT | Mod: RT

## 2019-07-29 NOTE — ADDENDUM
[FreeTextEntry1] : This note was written by Jason Vazquez on 07/29/2019, acting as a scribe for Abhijit South III, MD

## 2019-07-29 NOTE — PROCEDURE
[de-identified] : VIRGINIA HUFFMAN comes in today for the third Supartz injection to the right knee.  \par \par Physical Examination:\par Right Knee:\par Knee: Range of Motion in Degrees  	\par    	                        Claimant:  	            Normal:  	\par Flexion Active   	         135     	            135-degrees  	\par Flexion Passive  	         135  	            135-degrees  	\par Extension Active  	         0-5  	            0-5-degrees  	\par Extension Passive            0-5  	            0-5-degrees  	\par \par No evidence of instability in the AP plane or varus or valgus stress.  Negative  Lachman. Negative pivot shift.  Negative anterior drawer test.  Negative posterior drawer test. Negative Tammy.  Negative Apley grind.  No medial or lateral joint line tenderness. Positive tenderness over the medial and lateral facet of the patella.  Positive patellofemoral crepitations.  No lateral tilting patella.  No patellar apprehension. Positive crepitation in the medial and lateral femoral condyle.  No proximal or distal swelling, edema or tenderness.  No gross motor or sensory deficits.  Mild intra-articular swelling.  No varus or valgus malalignment.  2+ DP and PT pulses.  Skin is intact.  No rashes, scars or lesions.  \par \par Impression: \par Osteoarthritis of the right knee\par \par Consent:\par The risks and benefits of the procedure were discussed with the patient in detail.  Upon verbal consent of the patient, we proceeded with the Supartz injection as noted below.  \par \par Procedure:\par After sterile prep, the patient underwent a Supartz injection of 25 mg of Sodium Hyaluronate in a 2ML syringe into the right knee.  The patient tolerated the procedure well.  There were no complications.  \par \par NDC#:  [address]-4\par Lot#:    [id number]\par Exp Date:  04/30/22\par \par Plan:\par I have recommended ice and elevation.  The patient will be reassessed in one week for the next Supartz injection for the right knee osteoarthritis.   \par \par

## 2019-07-30 ENCOUNTER — APPOINTMENT (OUTPATIENT)
Dept: ORTHOPEDIC SURGERY | Facility: CLINIC | Age: 69
End: 2019-07-30
Payer: MEDICARE

## 2019-07-30 PROCEDURE — 20610 DRAIN/INJ JOINT/BURSA W/O US: CPT | Mod: RT

## 2019-08-01 NOTE — PROCEDURE
[de-identified] : VIRGINIA HUFFMAN comes in today for the fourth Supartz injection to the right knee.  \par \par Physical Examination:\par Right Knee:\par Range of Motion in Degrees  	\par    	                        Claimant:  	            Normal:  	\par Flexion Active   	         135     	            135-degrees  	\par Flexion Passive  	         135  	            135-degrees  	\par Extension Active  	         0-5  	            0-5-degrees  	\par Extension Passive            0-5  	            0-5-degrees  	\par \par No evidence of instability in the AP plane or varus or valgus stress.  Negative  Lachman. Negative pivot shift.  Negative anterior drawer test.  Negative posterior drawer test. Negative Tammy.  Negative Apley grind.  No medial or lateral joint line tenderness. Positive tenderness over the medial and lateral facet of the patella.  Positive patellofemoral crepitations.  No lateral tilting patella.  No patellar apprehension. Positive crepitation in the medial and lateral femoral condyle.  No proximal or distal swelling, edema or tenderness.  No gross motor or sensory deficits.  Mild intra-articular swelling.  No varus or valgus malalignment.  2+ DP and PT pulses.  Skin is intact.  No rashes, scars or lesions.  \par \par Impression: \par Osteoarthritis of the right knee\par \par Consent:\par The risks and benefits of the procedure were discussed with the patient in detail.  Upon verbal consent of the patient, we proceeded with the Supartz injection as noted below.  \par \par Procedure:\par After sterile prep, the patient underwent a Supartz injection of 25 mg of Sodium Hyaluronate in a 2ML syringe into the right knee.  The patient tolerated the procedure well.  There were no complications.  \par \par NDC#:  [address]-3\par Lot#:    [id number]\par Exp Date:  04/30/22\par \par Plan:\par I have recommended ice and elevation.  The patient will be reassessed in one week for the next Supartz injection for the right knee osteoarthritis.   \par \par

## 2019-08-01 NOTE — ADDENDUM
[FreeTextEntry1] : This note was written by Evi Lacy on 08/01/2019 acting as a scribe for ALICE ELKINS

## 2019-08-06 ENCOUNTER — APPOINTMENT (OUTPATIENT)
Dept: ORTHOPEDIC SURGERY | Facility: CLINIC | Age: 69
End: 2019-08-06
Payer: MEDICARE

## 2019-08-06 PROCEDURE — 20610 DRAIN/INJ JOINT/BURSA W/O US: CPT | Mod: RT

## 2019-08-08 NOTE — PROCEDURE
[de-identified] : VIRGINIA HUFFMAN comes in today for the fifth Supartz injection to the right knee.  \par \par Physical Examination:\par Right Knee:\par Range of Motion in Degrees  	\par    	                        Claimant:  	            Normal:  	\par Flexion Active   	         135     	            135-degrees  	\par Flexion Passive  	         135  	            135-degrees  	\par Extension Active  	         0-5  	            0-5-degrees  	\par Extension Passive            0-5  	            0-5-degrees  	\par \par No evidence of instability in the AP plane or varus or valgus stress.  Negative  Lachman. Negative pivot shift.  Negative anterior drawer test.  Negative posterior drawer test. Negative Tammy.  Negative Apley grind.  No medial or lateral joint line tenderness. Positive tenderness over the medial and lateral facet of the patella.  Positive patellofemoral crepitations.  No lateral tilting patella.  No patellar apprehension. Positive crepitation in the medial and lateral femoral condyle.  No proximal or distal swelling, edema or tenderness.  No gross motor or sensory deficits.  Mild intra-articular swelling.  No varus or valgus malalignment.  2+ DP and PT pulses.  Skin is intact.  No rashes, scars or lesions.  \par \par Impression: \par Osteoarthritis of the right knee\par \par Consent:\par The risks and benefits of the procedure were discussed with the patient in detail.  Upon verbal consent of the patient, we proceeded with the Supartz injection as noted below.  \par \par Procedure:\par After sterile prep, the patient underwent a Supartz injection of 25 mg of Sodium Hyaluronate in a 2ML syringe into the right knee.  The patient tolerated the procedure well.  There were no complications.  \par \par NDC#:  [address]-2\par Lot#:    [id number]\par Exp Date:  04/30/22\par \par Plan:\par I have recommended ice and elevation.  The patient will be reassessed in 6-8 weeks for the right knee osteoarthritis.   \par \par

## 2019-08-08 NOTE — ADDENDUM
[FreeTextEntry1] : This note was written by Albina Trujillo on 08/08/2019 acting as scribe for Lorenza Sarmiento, OTR/L, PA

## 2019-08-18 ENCOUNTER — RX RENEWAL (OUTPATIENT)
Age: 69
End: 2019-08-18

## 2019-09-09 ENCOUNTER — APPOINTMENT (OUTPATIENT)
Dept: ENDOCRINOLOGY | Facility: CLINIC | Age: 69
End: 2019-09-09
Payer: MEDICARE

## 2019-09-09 VITALS
SYSTOLIC BLOOD PRESSURE: 114 MMHG | WEIGHT: 159 LBS | DIASTOLIC BLOOD PRESSURE: 70 MMHG | HEART RATE: 85 BPM | HEIGHT: 60 IN | OXYGEN SATURATION: 96 % | BODY MASS INDEX: 31.22 KG/M2

## 2019-09-09 PROCEDURE — 99214 OFFICE O/P EST MOD 30 MIN: CPT | Mod: 25

## 2019-09-09 PROCEDURE — 96372 THER/PROPH/DIAG INJ SC/IM: CPT

## 2019-09-09 NOTE — DATA REVIEWED
[FreeTextEntry1] : Labs 3/2019:\par \par \par Thyroid Ultrasound Report 1/7/19:\par \par Technique: multiple real time longitudinal and transverse images were obtained using a high resolution ultrasound with a linear transducer, Edvivo e 2008 model, 10-12 MHz frequencies. All measurements will be reported as longitudinal x sal-posterior x transverse. \par Comparison: Report dated 5/2016. \par \par Indication: thyroid nodule. \par \par Findings: \par The right thyroid lobe measures 3.66 x 1.33 x 1.64 cm. The left thyroid lobe measures 4.12 x 1.21 x 1.68 cm. The isthmus measures 0.25 cm. \par The right thyroid lobe has a heterogeneous parenchyma. \par The left thyroid lobe has a heterogeneous parenchyma . \par  \par In the right mid pole there is a  mixed, heterogenous nodule. It measures 1.33 x 0.77 x 1.17 cm. The nodule is ovoid in shape and the border is distinct. It has peripheral vascularity. \par \par In the right lower pole there is a  mixed. It measures 0.95 x 0.81 x 0.96 cm. The nodule is ovoid in shape and the border is distinct. It has peripheral vascularity. \par \par In the left mid pole there is a  mixed, heterogenous nodule. It measures 1.11 x 1.03 x 0.94 cm. The nodule is ovoid in shape and the border is distinct. It has peripheral and scant internal vascularity. \par \par In the left mid pole there is a  hypoechoic nodule. It measures 0.65 x 0.43 x 0.87 cm. The nodule is ovoid in shape and the border is smooth. The nodule does not have a halo. It demonstrates no calcifications. It has no vascularity. parathyroid adenoma posterior to R lobe 1.18 x 1.08 x 0.96 cm. \par \par In the right upper pole there is a  cyst. It measures 0.75 x 0.56 x 0.68 cm. The nodule is round in shape and the border is smooth. The nodule does not have a halo. It demonstrates no calcifications. It has no vascularity. \par \par Additional bilateral subcentimeter cysts and nodules too numerous to characterize. \par \par \par Labs 9/2018:\par CBC normal\par CMP normal\par A1c 5.8%\par Ca 10\par Corrected Ca 9.5\par \par HDL 61\par Choll 232\par Trig 98\par Vit D 43.2\par Mg 1.9\par \par Labs 7/2018:\par CBC normal\par A1c 5.7%\par CMP normal\par Ca 10\par Corrected 9.6\par PTH 46\par TSH 2.26\par Free T4 1.4\par Phos 2.8\par Vit D 39.8\par Vit D 1,25 37.3\par \par Labs 12/5/17:\par CBC normal\par Mg 1.9\par Glucose 112\par Ca 10\par PTH 36\par CMP otherwise normal\par Phos 3.5\par Chol 228\par Trig 243\par HDL 59\par \par TSH 1.67\par Free T4 1.2\par A1c 5.6%\par Vit D 30.9\par \par \par \par Labs 4/2017:\par Glucose 107\par \par HDL 76\par Chol 245\par Trig 205\par Vit D 31.8\par A1c 6.1%\par \par Labs 11/2016:\par TSH 1.26\par Free T4 1.3\par CBC normal\par A1c 6.0%\par CMP normal except glucose of 115\par \par \par FNA 5/31/16: Benign\par \par Labs 5/2/16:\par PTH 33\par Ca 10.2\par TSH 1.36\par Free T4 1.3\par \par Thyroid US performed 5/2/16:\par \par Indication: thyroid nodule. \par \par Findings: \par The right thyroid lobe measures 3.63 x 1.12 x 1.71 cm. The left thyroid lobe measures 3.73x 1.22 x 1.72 cm. The isthmus measures 0.29 cm. \par  \par In the right lower pole there is a  mixed, heterogenous nodule. It measures 1.29 x 0.71 x 1.1 cm. The nodule is ovoid in shape and the border is distinct. It has peripheral vascularity. \par \par In the right lower pole there is a  mixed. It measures 0.83 0.52 x 0.70 cm. The nodule is ovoid in shape and the border is distinct. It has peripheral vascularity. \par \par In the left lower pole there is a  mixed, heterogenous nodule. It measures 1.38 x 1.02 x 0.82 cm. The nodule is ovoid in shape and the border is distinct. It has peripheral and scant internal vascularity. \par \par parathyroid adenoma posterior to R lobe 1.18 x 1.08 x 0.96 cm. \par \par DEXA performed May 2,2016\par spine not performed due to surgery\par Tot Hip -2.2 (+4.9% change from 2013)\par Fem Neck -2.3 Z(-5.2 % change from 2013)\par Proximal radius -3.4 (-11.3% change from 2013)\par \par DEXA performed September 10, 2013\par spine not performed due to surgery\par Total Hipt -2.5, osteoporosis, stable versus prior study of 2011 (-2.7)\par femoral neck -2.0, osteopenia, stable versus 2011 (-2.3)\par Proximal radius -2.4, osteopenia, no prior

## 2019-09-09 NOTE — RESULTS/DATA
[Hologic] : hologic [T-Score ___] : T-score: [unfilled] [FreeTextEntry1] : 6/5/17: Total Hip -2.1; Neck -2.0, 1/3 radius -3.1\par  [FreeTextEntry2] : 5/2/16

## 2019-09-09 NOTE — HISTORY OF PRESENT ILLNESS
[Prolia (Denosumab)] : Prolia (Denosumab) [Premat. Menopause (surg)] : premature menopause which occurred surgically [High Fall Risk] : high fall risk [Frequent Falls] : frequent falls [Uses a Walker/Cane] : use of a walker/cane [Regular Dental Follow-Up] : regular dental follow-up [History of Blood Clots] : history of blood clots [FreeTextEntry1] : 69-year-old F with PMH osteoporosis, thyroid nodules, prediabetes presents for follow up.\par \par Osteoporosis: Seen in 2013 for osteoporosis. Told of osteopenia approximately in 2005. Was treated from [id number] with Fosamax and then stopped due to esophagitis. DXA 2013 stable - drug holiday continued. No history of fractures. Mother did not have hip fx. Patient smoked 1-2 PPD x 20 yrs - quit 1982. No prolonged steroid use. No recent steroids. Premature menopause at age 33 s/p RAJIV for fibroids. She has a history of several orthopedic procedures including spinal fusion 1966 and multiple further spinal surgeries. History of left hip replacement 2010 and right shoulder replacement 2012 for arthritis.  Taking Vit D 2000 IU QD. Now taking calcium. Seen initially by me 5/2/16 with repeat DXA with significant decline in BMD of 1/3 radius from -2.4 in 2013 to -3.4. PTH not elevated, but parathyroid adenoma possible visualized on thyroid U/S. Recommended prolia s/p 1st injection 5/24/16, 2nd injection 11/2016. 3rd injection 6/2017. 4th injection 12/18/17. 5th injection 7/2/18. 6th injection 1/2019. Here for 7th injection. No recent fractures, but has fallen since last visit. Follows with dental. No upcoming dental work. Has hx of dental implants. Last DXA 6/2017 total spine -2.1, 1/3 radius -3.1, Hip -2.1. Next DXA to be scheduled. \par \par B/L Thyroid Nodules: Outpatient Thyroid US performed 3/2016 revealed B/L thyroid nodules, with heterogenous, hypoechoic solid nodule of the L midpole measuring 1.5 x 1.1 x 0.8cm;  heterogenous, hypoechoic solid nodule of the R mid-lower pole measuring 1.3 x 0.8 x 1.1 cm; and  heterogenous, hypoechoic solid nodule of the RLL measuring 1.3 x 1.4 x 1.0 cm.  Nodules were found incidentally on work up for falls. No personal h/o radiation to the head or neck. No h/o abnormal thyroid blood work. No voice changes. + dysphagia after paraesophageal hernia repair. Father had thyroid nodule - unknown if malignant. Seen initially 5/2016 with thyroid U/S confirming dominant left thyroid nodule. Recommended FNA performed 5/2016 with benign results. Repeat thyroid US 1/2019 stable. Patient reports occasional hand tremors. Denies palpitations. Denies heat or cold intolerance. +weight loss and constipation from methadone. No h/o of amiodarone or lithium use. No changes to the neck. \par \par On chronic methadone for restless leg syndrome [Taking Steroids] : no past or present history of taking steroids [Kidney Stones] : no history of kidney stones [Excessive Alcohol Intake] : no past or present history of excessive alcohol intake [Current Smoking___(ppd)] : not currently smoking [Family History of Osteoporosis] : no family history of osteoporosis [Family History of Breast Cancer] : no family history of breast cancer [Family History of Hip Fracture] : no family history of hip fracture [Hyperparathyroidism] : no hyperparathyroidism [History of Radiation Therapy] : no history of radiation therapy [Previous Fragility Fracture] : no previous fragility fracture

## 2019-09-09 NOTE — PROCEDURE
[Conservis e 2008 model, 10-12 MHz frequencies] : multiple real time longitudinal and transverse images were obtained using a high resolution ultrasound with a linear transducer, Conservis e 2008 model, 10-12 MHz frequencies. All measurements will be reported as longitudinal x sal-posterior x transverse. [Report dated ___] : Report dated [unfilled] [Thyroid Nodule] : thyroid nodule [] : a heterogeneous parenchyma  [Lower] : lower pole there is a  [Peripheral vascularity] : peripheral vascularity [Mixed] : mixed [Heterogeneous] : heterogenous nodule [Peripheral and scant  internal vascularity] : peripheral and scant internal vascularity [Distinct] : distinct [Left Thyroid] : left [Mid] : mid pole there is a  [Hypoechoic] : hypoechoic nodule [Ovoid] : ovoid in shape [Right Thyroid] : right [Cyst] : cyst [Upper] : upper pole there is a  [Round] : round in shape [No] : does not have a halo [Smooth] : smooth [No calcifications] : no calcifications [No vascularity] : no vascularity [FreeTextEntry1] : 3.66 x 1.33 x 1.64 [FreeTextEntry2] : 0.25 [FreeTextEntry5] : 4.12 x 1.21 x 1.68 [FreeTextEntry4] : parathyroid adenoma posterior to R lobe 1.18 x 1.08 x 0.96 cm [FreeTextEntry3] : 0.75 x 0.56 x 0.68 [de-identified] : Additional bilateral subcentimeter cysts and nodules too numerous to characterize.

## 2019-09-09 NOTE — ASSESSMENT
[Carbohydrate Consistent Diet] : carbohydrate consistent diet [Importance of Diet and Exercise] : importance of diet and exercise to improve glycemic control, achieve weight loss and improve cardiovascular health [FreeTextEntry1] : 69-year-old F w/\par \par 1. B/L Thyroid nodules - Thyroid FNA of Left nodule (please refer to official [place] report) benign. Repeat thyroid U/S 1/2019 stable compared to 2016 (see results section for full description). Clinically and chemically euthyroid. \par \par 2. Osteoporosis -  DXA repeated 6/2017 with results as noted above with stable/mild improvement in proximal radius T score of -3.1 from -3.4 (-11.3% change from 2013). s/p drug holiday for ~ 7 years. Started on prolia 1st dose 5/24/16. 2nd dose 11/29/16. 3rd Prolia 6/2017. 4th dose 12/2017. 5th dose 7/2018. 6th dose 1/2019. 7th dose of Prolia given today (BUY AND BILL). Next injection 3/2020. Next DXA to be done prior to next injection.\par \par 3. Prediabetes- A1c 5.8%. Lifestyle modification advised. \par

## 2019-09-09 NOTE — REVIEW OF SYSTEMS
[Fatigue] : fatigue [Constipation] : constipation [Joint Pain] : joint pain [Back Pain] : back pain [Tremors] : tremors [All other systems negative] : All other systems negative [Recent Weight Gain (___ Lbs)] : no recent weight gain [Recent Weight Loss (___ Lbs)] : no recent weight loss [Fever] : no fever [Chills] : no chills [Blurry Vision] : no blurred vision [Dysphagia] : no dysphagia [Dysphonia] : no dysphonia [Palpitations] : no palpitations [Shortness Of Breath] : no shortness of breath [Vomiting] : no vomiting was observed [Nausea] : no nausea [Cold Intolerance] : cold tolerant [Heat Intolerance] : heat tolerant [FreeTextEntry9] : +arthritis

## 2019-09-09 NOTE — PHYSICAL EXAM
[Alert] : alert [Supple] : the neck was supple [No Acute Distress] : no acute distress [No Respiratory Distress] : no respiratory distress [No Thyroid Nodules] : there were no palpable thyroid nodules [No Accessory Muscle Use] : no accessory muscle use [Normal Rate and Effort] : normal respiratory rhythm and effort [Clear to Auscultation] : lungs were clear to auscultation bilaterally [Normal Rate] : heart rate was normal  [Normal S1, S2] : normal S1 and S2 [Regular Rhythm] : with a regular rhythm [Normal Bowel Sounds] : normal bowel sounds [Not Tender] : non-tender [Soft] : abdomen soft [Not Distended] : not distended [No Spinal Tenderness] : no spinal tenderness [Kyphosis] : kyphosis present [No Stigmata of Cushings Syndrome] : no stigmata of cushings syndrome [No Tremors] : no tremors [Normal Reflexes] : deep tendon reflexes were 2+ and symmetric [Normal Affect] : the affect was normal [Oriented x3] : oriented to person, place, and time [Acanthosis Nigricans] : no acanthosis nigricans [de-identified] : walks with walker

## 2019-09-10 ENCOUNTER — TRANSCRIPTION ENCOUNTER (OUTPATIENT)
Age: 69
End: 2019-09-10

## 2019-09-30 ENCOUNTER — RX RENEWAL (OUTPATIENT)
Age: 69
End: 2019-09-30

## 2019-10-20 NOTE — PHYSICAL EXAM
[No Acute Distress] : no acute distress [Well Nourished] : well nourished [Well Developed] : well developed [Well-Appearing] : well-appearing [Normal Sclera/Conjunctiva] : normal sclera/conjunctiva [EOMI] : extraocular movements intact [PERRL] : pupils equal round and reactive to light [Normal Outer Ear/Nose] : the outer ears and nose were normal in appearance [Normal Oropharynx] : the oropharynx was normal [No JVD] : no jugular venous distention [No Lymphadenopathy] : no lymphadenopathy [Supple] : supple [Thyroid Normal, No Nodules] : the thyroid was normal and there were no nodules present [No Respiratory Distress] : no respiratory distress  [No Accessory Muscle Use] : no accessory muscle use [Clear to Auscultation] : lungs were clear to auscultation bilaterally [Normal Rate] : normal rate  [Regular Rhythm] : with a regular rhythm [Normal S1, S2] : normal S1 and S2 [No Murmur] : no murmur heard [No Abdominal Bruit] : a ~M bruit was not heard ~T in the abdomen [No Carotid Bruits] : no carotid bruits [No Varicosities] : no varicosities [Pedal Pulses Present] : the pedal pulses are present [No Edema] : there was no peripheral edema [No Palpable Aorta] : no palpable aorta [No Extremity Clubbing/Cyanosis] : no extremity clubbing/cyanosis [Soft] : abdomen soft [Non Tender] : non-tender [No Masses] : no abdominal mass palpated [Non-distended] : non-distended [No HSM] : no HSM [Normal Posterior Cervical Nodes] : no posterior cervical lymphadenopathy [Normal Bowel Sounds] : normal bowel sounds [Normal Anterior Cervical Nodes] : no anterior cervical lymphadenopathy [No CVA Tenderness] : no CVA  tenderness [No Spinal Tenderness] : no spinal tenderness [No Joint Swelling] : no joint swelling [Grossly Normal Strength/Tone] : grossly normal strength/tone [No Rash] : no rash [Coordination Grossly Intact] : coordination grossly intact [Normal Gait] : normal gait [No Focal Deficits] : no focal deficits [Normal Affect] : the affect was normal [Deep Tendon Reflexes (DTR)] : deep tendon reflexes were 2+ and symmetric [Normal Insight/Judgement] : insight and judgment were intact

## 2019-10-24 ENCOUNTER — LABORATORY RESULT (OUTPATIENT)
Age: 69
End: 2019-10-24

## 2019-10-24 ENCOUNTER — APPOINTMENT (OUTPATIENT)
Dept: INTERNAL MEDICINE | Facility: CLINIC | Age: 69
End: 2019-10-24
Payer: MEDICARE

## 2019-10-24 VITALS
HEART RATE: 74 BPM | DIASTOLIC BLOOD PRESSURE: 80 MMHG | SYSTOLIC BLOOD PRESSURE: 118 MMHG | BODY MASS INDEX: 31.44 KG/M2 | WEIGHT: 161 LBS | RESPIRATION RATE: 14 BRPM

## 2019-10-24 PROCEDURE — 90662 IIV NO PRSV INCREASED AG IM: CPT

## 2019-10-24 PROCEDURE — G0444 DEPRESSION SCREEN ANNUAL: CPT | Mod: 59

## 2019-10-24 PROCEDURE — G0008: CPT

## 2019-10-24 PROCEDURE — G0439: CPT

## 2019-10-24 PROCEDURE — G0442 ANNUAL ALCOHOL SCREEN 15 MIN: CPT | Mod: 59

## 2019-10-24 RX ORDER — TOLTERODINE TARTRATE 2 MG/1
2 CAPSULE, EXTENDED RELEASE ORAL
Qty: 90 | Refills: 0 | Status: DISCONTINUED | COMMUNITY
Start: 2017-05-05 | End: 2019-10-24

## 2019-10-24 RX ORDER — SODIUM HYALURONATE INTRA-ARTICULAR SOLN PREF SYR 25 MG/2.5ML 25/2.5 MG/ML
25 SOLUTION PREFILLED SYRINGE INTRAARTICULAR
Qty: 5 | Refills: 0 | Status: DISCONTINUED | OUTPATIENT
Start: 2019-06-26 | End: 2019-10-24

## 2019-10-24 NOTE — HISTORY OF PRESENT ILLNESS
[FreeTextEntry1] : Here for CPE.\par  [de-identified] : Generally feels well with no specific complaints.\par Would like to stop duloxetine and tolterodine as she feels neither are helping. \par \par Denies headache, dizziness.\par Denies rash, fatigue, fever, weight loss, anorexia.\par Denies cough, wheezing.\par Denies CP, SOB, POLLARD, edema, palpitations.\par Denies abdominal pain, N/V/D/C. Denies BRBPR, melena\par Denies dysuria, frequency, hematuria.

## 2019-10-24 NOTE — HEALTH RISK ASSESSMENT
[Excellent] : ~his/her~  mood as  excellent [Yes] : Yes [Monthly or less (1 pt)] : Monthly or less (1 point) [3 or 4 (1 pt)] : 3 or 4  (1 point) [Never (0 pts)] : Never (0 points) [No] : In the past 12 months have you used drugs other than those required for medical reasons? No [No falls in past year] : Patient reported no falls in the past year [0] : 2) Feeling down, depressed, or hopeless: Not at all (0) [None] : None [With Family] : lives with family [Retired] : retired [Feels Safe at Home] : Feels safe at home [] :  [Fully functional (bathing, dressing, toileting, transferring, walking, feeding)] : Fully functional (bathing, dressing, toileting, transferring, walking, feeding) [Fully functional (using the telephone, shopping, preparing meals, housekeeping, doing laundry, using] : Fully functional and needs no help or supervision to perform IADLs (using the telephone, shopping, preparing meals, housekeeping, doing laundry, using transportation, managing medications and managing finances) [Reports normal functional visual acuity (ie: able to read med bottle)] : Reports normal functional visual acuity [Smoke Detector] : smoke detector [Seat Belt] :  uses seat belt [] : No [Audit-CScore] : 2 [[id number]] : 0 [Change in mental status noted] : No change in mental status noted [Language] : denies difficulty with language [Behavior] : denies difficulty with behavior [Learning/Retaining New Information] : denies difficulty learning/retaining new information [Handling Complex Tasks] : denies difficulty handling complex tasks [Reasoning] : denies difficulty with reasoning [Spatial Ability and Orientation] : denies difficulty with spatial ability and orientation [Reports changes in hearing] : Reports no changes in hearing [Reports changes in vision] : Reports no changes in vision [Reports changes in dental health] : Reports no changes in dental health

## 2019-11-06 ENCOUNTER — APPOINTMENT (OUTPATIENT)
Dept: INTERNAL MEDICINE | Facility: CLINIC | Age: 69
End: 2019-11-06
Payer: MEDICARE

## 2019-11-06 PROCEDURE — 77080 DXA BONE DENSITY AXIAL: CPT | Mod: GA

## 2019-11-12 ENCOUNTER — CLINICAL ADVICE (OUTPATIENT)
Age: 69
End: 2019-11-12

## 2019-12-19 ENCOUNTER — MEDICATION RENEWAL (OUTPATIENT)
Age: 69
End: 2019-12-19

## 2020-03-09 ENCOUNTER — APPOINTMENT (OUTPATIENT)
Dept: ENDOCRINOLOGY | Facility: CLINIC | Age: 70
End: 2020-03-09

## 2020-06-05 ENCOUNTER — RX RENEWAL (OUTPATIENT)
Age: 70
End: 2020-06-05

## 2020-06-08 ENCOUNTER — APPOINTMENT (OUTPATIENT)
Dept: ENDOCRINOLOGY | Facility: CLINIC | Age: 70
End: 2020-06-08
Payer: MEDICARE

## 2020-06-08 PROCEDURE — 99214 OFFICE O/P EST MOD 30 MIN: CPT | Mod: 95

## 2020-06-08 NOTE — HISTORY OF PRESENT ILLNESS
[Home] : at home, [unfilled] , at the time of the visit. [Other Location: e.g. Home (Enter Location, City,State)___] : at [unfilled] [Prolia (Denosumab)] : Prolia (Denosumab) [Premat. Menopause (surg)] : premature menopause which occurred surgically [High Fall Risk] : high fall risk [Frequent Falls] : frequent falls [Uses a Walker/Cane] : use of a walker/cane [Regular Dental Follow-Up] : regular dental follow-up [History of Blood Clots] : history of blood clots [Verbal consent obtained from patient] : the patient, [unfilled] [FreeTextEntry1] : 69-year-old F with PMH osteoporosis, thyroid nodules, prediabetes presents for follow up.\par \par Osteoporosis: Seen in 2013 for osteoporosis. Told of osteopenia approximately in 2005. Was treated from [id number] with Fosamax and then stopped due to esophagitis. DXA 2013 stable - drug holiday continued. No history of fractures. Mother did not have hip fx. Patient smoked 1-2 PPD x 20 yrs - quit 1982. No prolonged steroid use. No recent steroids. Premature menopause at age 33 s/p RAJIV for fibroids. She has a history of several orthopedic procedures including spinal fusion 1966 and multiple further spinal surgeries. History of left hip replacement 2010 and right shoulder replacement 2012 for arthritis.  Taking Vit D 2000 IU QD. Now taking calcium. Seen initially by me 5/2/16 with repeat DXA with significant decline in BMD of 1/3 radius from -2.4 in 2013 to -3.4. PTH not elevated, but parathyroid adenoma possible visualized on thyroid U/S. Recommended prolia s/p 1st injection 5/24/16, 2nd injection 11/2016. 3rd injection 6/2017. 4th injection 12/18/17. 5th injection 7/2/18. 6th injection 1/2019. 7th injection 9/2019. Was due for 8th injection 3/2020, but postponed due to COVID. No recent fractures, but has fallen since last visit. Follows with dental. No upcoming dental work. Has hx of dental implants. Last DXA 6/2017 total spine -2.1, 1/3 radius -3.1, Hip -2.1. Next DXA to be scheduled. \par \par B/L Thyroid Nodules: Outpatient Thyroid US performed 3/2016 revealed B/L thyroid nodules, with heterogenous, hypoechoic solid nodule of the L midpole measuring 1.5 x 1.1 x 0.8cm;  heterogenous, hypoechoic solid nodule of the R mid-lower pole measuring 1.3 x 0.8 x 1.1 cm; and  heterogenous, hypoechoic solid nodule of the RLL measuring 1.3 x 1.4 x 1.0 cm.  Nodules were found incidentally on work up for falls. No personal h/o radiation to the head or neck. No h/o abnormal thyroid blood work. No voice changes. + dysphagia after paraesophageal hernia repair. Father had thyroid nodule - unknown if malignant. Seen initially 5/2016 with thyroid U/S confirming dominant left thyroid nodule. Recommended FNA performed 5/2016 with benign results. Repeat thyroid US 1/2019 stable. Patient reports occasional hand tremors. Denies palpitations. Denies heat or cold intolerance. +weight loss and constipation from methadone. No h/o of amiodarone or lithium use. No changes to the neck. \par \par On chronic methadone for restless leg syndrome [Taking Steroids] : no past or present history of taking steroids [Kidney Stones] : no history of kidney stones [Excessive Alcohol Intake] : no past or present history of excessive alcohol intake [Current Smoking___(ppd)] : not currently smoking [Family History of Osteoporosis] : no family history of osteoporosis [Family History of Breast Cancer] : no family history of breast cancer [Family History of Hip Fracture] : no family history of hip fracture [Hyperparathyroidism] : no hyperparathyroidism [History of Radiation Therapy] : no history of radiation therapy [Previous Fragility Fracture] : no previous fragility fracture

## 2020-06-08 NOTE — REASON FOR VISIT
[Follow - Up] : a follow-up visit [Osteoporosis] : osteoporosis [Thyroid nodule/ MNG] : thyroid nodule/ MNG

## 2020-06-08 NOTE — PROCEDURE
[[email or web address] e 2008 model, 10-12 MHz frequencies] : multiple real time longitudinal and transverse images were obtained using a high resolution ultrasound with a linear transducer, [email or web address] e 2008 model, 10-12 MHz frequencies. All measurements will be reported as longitudinal x sal-posterior x transverse. [Report dated ___] : Report dated [unfilled] [Thyroid Nodule] : thyroid nodule [] : a heterogeneous parenchyma  [Lower] : lower pole there is a  [Peripheral vascularity] : peripheral vascularity [Mixed] : mixed [Heterogeneous] : heterogenous nodule [Distinct] : distinct [Peripheral and scant  internal vascularity] : peripheral and scant internal vascularity [Left Thyroid] : left [Mid] : mid pole there is a  [Hypoechoic] : hypoechoic nodule [Ovoid] : ovoid in shape [Right Thyroid] : right [Upper] : upper pole there is a  [Cyst] : cyst [Round] : round in shape [Smooth] : smooth [No] : does not have a halo [No calcifications] : no calcifications [No vascularity] : no vascularity [FreeTextEntry1] : 3.66 x 1.33 x 1.64 [FreeTextEntry5] : 4.12 x 1.21 x 1.68 [FreeTextEntry2] : 0.25 [FreeTextEntry4] : parathyroid adenoma posterior to R lobe 1.18 x 1.08 x 0.96 cm [FreeTextEntry3] : 0.75 x 0.56 x 0.68 [de-identified] : Additional bilateral subcentimeter cysts and nodules too numerous to characterize.

## 2020-06-08 NOTE — PHYSICAL EXAM
[Alert] : alert [Well Nourished] : well nourished [Healthy Appearance] : healthy appearance [No Acute Distress] : no acute distress [Normal Voice/Communication] : normal voice communication [No Proptosis] : no proptosis [No Respiratory Distress] : no respiratory distress [No Accessory Muscle Use] : no accessory muscle use [Normal Rate and Effort] : normal respiratory rate and effort [No Stigmata of Cushings Syndrome] : no stigmata of Cushings Syndrome [No Involuntary Movements] : no involuntary movements were seen [Oriented x3] : oriented to person, place, and time [Normal Affect] : the affect was normal [Normal Mood] : the mood was normal

## 2020-06-08 NOTE — ASSESSMENT
[Carbohydrate Consistent Diet] : carbohydrate consistent diet [Importance of Diet and Exercise] : importance of diet and exercise to improve glycemic control, achieve weight loss and improve cardiovascular health [FreeTextEntry1] : 70-year-old F w/  \par \par 1. B/L Thyroid nodules - Thyroid FNA of Left nodule (please refer to official [place] report) benign. Repeat thyroid U/S 1/2019 stable compared to 2016 (see results section for full description). Clinically and chemically euthyroid.   \par \par 2. Osteoporosis -  DXA repeated 6/2017 with results as noted above with stable/mild improvement in proximal radius T score of -3.1 from -3.4 (-11.3% change from 2013). s/p drug holiday for ~ 7 years. Started on prolia 1st dose 5/24/16. 2nd dose 11/29/16. 3rd Prolia 6/2017. 4th dose 12/2017. 5th dose 7/2018. 6th dose 1/2019. 7th dose of Prolia given 9/2019 (BUY AND BILL). Next injection asap. Next DXA to be done prior to next injection.  \par \par 3. Prediabetes- A1c 5.7%. Lifestyle modification advised.  \par \par 4. HTN- c/w chlorthalidone and losartan\par \par 5. HLD- statin\par \par 6. Viral screening- COVID Ab testing ordered.

## 2020-06-08 NOTE — REVIEW OF SYSTEMS
[Fatigue] : no fatigue [Recent Weight Gain (___ Lbs)] : no recent weight gain [Recent Weight Loss (___ Lbs)] : no recent weight loss [Visual Field Defect] : no visual field defect [Dysphagia] : no dysphagia [Neck Pain] : no neck pain [Dysphonia] : no dysphonia [Chest Pain] : no chest pain [Shortness Of Breath] : no shortness of breath [Nausea] : no nausea [Constipation] : no constipation [Vomiting] : no vomiting [Diarrhea] : no diarrhea [Polyuria] : no polyuria [Joint Pain] : joint pain [Polydipsia] : no polydipsia [All other systems negative] : All other systems negative [FreeTextEntry8] : Menopause

## 2020-06-08 NOTE — DATA REVIEWED
[FreeTextEntry1] : Labs 10/2019:\par CBC normal\par \par HDL 63\par Chol 242\par Trig 188\par CMP normal\par TSH 1.67\par A1c 5.7%\par \par Labs 3/2019:\par \par \par Thyroid Ultrasound Report 1/7/19:\par \par Technique: multiple real time longitudinal and transverse images were obtained using a high resolution ultrasound with a linear transducer, [employer] e 2008 model, 10-12 MHz frequencies. All measurements will be reported as longitudinal x sal-posterior x transverse. \par Comparison: Report dated 5/2016. \par \par Indication: thyroid nodule. \par \par Findings: \par The right thyroid lobe measures 3.66 x 1.33 x 1.64 cm. The left thyroid lobe measures 4.12 x 1.21 x 1.68 cm. The isthmus measures 0.25 cm. \par The right thyroid lobe has a heterogeneous parenchyma. \par The left thyroid lobe has a heterogeneous parenchyma . \par  \par In the right mid pole there is a  mixed, heterogenous nodule. It measures 1.33 x 0.77 x 1.17 cm. The nodule is ovoid in shape and the border is distinct. It has peripheral vascularity. \par \par In the right lower pole there is a  mixed. It measures 0.95 x 0.81 x 0.96 cm. The nodule is ovoid in shape and the border is distinct. It has peripheral vascularity. \par \par In the left mid pole there is a  mixed, heterogenous nodule. It measures 1.11 x 1.03 x 0.94 cm. The nodule is ovoid in shape and the border is distinct. It has peripheral and scant internal vascularity. \par \par In the left mid pole there is a  hypoechoic nodule. It measures 0.65 x 0.43 x 0.87 cm. The nodule is ovoid in shape and the border is smooth. The nodule does not have a halo. It demonstrates no calcifications. It has no vascularity. parathyroid adenoma posterior to R lobe 1.18 x 1.08 x 0.96 cm. \par \par In the right upper pole there is a  cyst. It measures 0.75 x 0.56 x 0.68 cm. The nodule is round in shape and the border is smooth. The nodule does not have a halo. It demonstrates no calcifications. It has no vascularity. \par \par Additional bilateral subcentimeter cysts and nodules too numerous to characterize. \par \par \par Labs 9/2018:\par CBC normal\par CMP normal\par A1c 5.8%\par Ca 10\par Corrected Ca 9.5\par \par HDL 61\par Choll 232\par Trig 98\par Vit D 43.2\par Mg 1.9\par \par Labs 7/2018:\par CBC normal\par A1c 5.7%\par CMP normal\par Ca 10\par Corrected 9.6\par PTH 46\par TSH 2.26\par Free T4 1.4\par Phos 2.8\par Vit D 39.8\par Vit D 1,25 37.3\par \par Labs 12/5/17:\par CBC normal\par Mg 1.9\par Glucose 112\par Ca 10\par PTH 36\par CMP otherwise normal\par Phos 3.5\par Chol 228\par Trig 243\par HDL 59\par \par TSH 1.67\par Free T4 1.2\par A1c 5.6%\par Vit D 30.9\par \par \par \par Labs 4/2017:\par Glucose 107\par \par HDL 76\par Chol 245\par Trig 205\par Vit D 31.8\par A1c 6.1%\par \par Labs 11/2016:\par TSH 1.26\par Free T4 1.3\par CBC normal\par A1c 6.0%\par CMP normal except glucose of 115\par \par \par FNA 5/31/16: Benign\par \par Labs 5/2/16:\par PTH 33\par Ca 10.2\par TSH 1.36\par Free T4 1.3\par \par Thyroid US performed 5/2/16:\par \par Indication: thyroid nodule. \par \par Findings: \par The right thyroid lobe measures 3.63 x 1.12 x 1.71 cm. The left thyroid lobe measures 3.73x 1.22 x 1.72 cm. The isthmus measures 0.29 cm. \par  \par In the right lower pole there is a  mixed, heterogenous nodule. It measures 1.29 x 0.71 x 1.1 cm. The nodule is ovoid in shape and the border is distinct. It has peripheral vascularity. \par \par In the right lower pole there is a  mixed. It measures 0.83 0.52 x 0.70 cm. The nodule is ovoid in shape and the border is distinct. It has peripheral vascularity. \par \par In the left lower pole there is a  mixed, heterogenous nodule. It measures 1.38 x 1.02 x 0.82 cm. The nodule is ovoid in shape and the border is distinct. It has peripheral and scant internal vascularity. \par \par parathyroid adenoma posterior to R lobe 1.18 x 1.08 x 0.96 cm. \par \par DEXA performed May 2,2016\par spine not performed due to surgery\par Tot Hip -2.2 (+4.9% change from 2013)\par Fem Neck -2.3 Z(-5.2 % change from 2013)\par Proximal radius -3.4 (-11.3% change from 2013)\par \par DEXA performed September 10, 2013\par spine not performed due to surgery\par Total Hipt -2.5, osteoporosis, stable versus prior study of 2011 (-2.7)\par femoral neck -2.0, osteopenia, stable versus 2011 (-2.3)\par Proximal radius -2.4, osteopenia, no prior

## 2020-06-16 ENCOUNTER — APPOINTMENT (OUTPATIENT)
Dept: ENDOCRINOLOGY | Facility: CLINIC | Age: 70
End: 2020-06-16
Payer: MEDICARE

## 2020-06-16 PROCEDURE — 96372 THER/PROPH/DIAG INJ SC/IM: CPT

## 2020-06-16 RX ORDER — DENOSUMAB 60 MG/ML
60 INJECTION SUBCUTANEOUS
Qty: 1 | Refills: 0 | Status: COMPLETED | OUTPATIENT
Start: 2020-06-12

## 2020-06-18 LAB
25(OH)D3 SERPL-MCNC: 26.7 NG/ML
ALBUMIN SERPL ELPH-MCNC: 4.4 G/DL
ALP BLD-CCNC: 134 U/L
ALT SERPL-CCNC: 10 U/L
ANION GAP SERPL CALC-SCNC: 13 MMOL/L
AST SERPL-CCNC: 17 U/L
BASOPHILS # BLD AUTO: 0.04 K/UL
BASOPHILS NFR BLD AUTO: 0.9 %
BILIRUB SERPL-MCNC: 0.3 MG/DL
BUN SERPL-MCNC: 16 MG/DL
CALCIUM SERPL-MCNC: 9.7 MG/DL
CALCIUM SERPL-MCNC: 9.7 MG/DL
CHLORIDE SERPL-SCNC: 100 MMOL/L
CHOLEST SERPL-MCNC: 197 MG/DL
CHOLEST/HDLC SERPL: 3.1 RATIO
CO2 SERPL-SCNC: 29 MMOL/L
CREAT SERPL-MCNC: 0.89 MG/DL
EOSINOPHIL # BLD AUTO: 0.11 K/UL
EOSINOPHIL NFR BLD AUTO: 2.5 %
ESTIMATED AVERAGE GLUCOSE: 117 MG/DL
GLUCOSE SERPL-MCNC: 92 MG/DL
HBA1C MFR BLD HPLC: 5.7 %
HCT VFR BLD CALC: 38.2 %
HDLC SERPL-MCNC: 63 MG/DL
HGB BLD-MCNC: 12.1 G/DL
IMM GRANULOCYTES NFR BLD AUTO: 0.2 %
LDLC SERPL CALC-MCNC: 112 MG/DL
LYMPHOCYTES # BLD AUTO: 1.22 K/UL
LYMPHOCYTES NFR BLD AUTO: 27.4 %
MAN DIFF?: NORMAL
MCHC RBC-ENTMCNC: 28.4 PG
MCHC RBC-ENTMCNC: 31.7 GM/DL
MCV RBC AUTO: 89.7 FL
MONOCYTES # BLD AUTO: 0.31 K/UL
MONOCYTES NFR BLD AUTO: 7 %
NEUTROPHILS # BLD AUTO: 2.76 K/UL
NEUTROPHILS NFR BLD AUTO: 62 %
PARATHYROID HORMONE INTACT: 40 PG/ML
PLATELET # BLD AUTO: 218 K/UL
POTASSIUM SERPL-SCNC: 4 MMOL/L
PROT SERPL-MCNC: 6.5 G/DL
RBC # BLD: 4.26 M/UL
RBC # FLD: 13.6 %
SARS-COV-2 IGG SERPL IA-ACNC: <0.1 INDEX
SARS-COV-2 IGG SERPL QL IA: NEGATIVE
SODIUM SERPL-SCNC: 142 MMOL/L
T4 FREE SERPL-MCNC: 1.1 NG/DL
TRIGL SERPL-MCNC: 108 MG/DL
TSH SERPL-ACNC: 1.7 UIU/ML
WBC # FLD AUTO: 4.45 K/UL

## 2020-06-23 ENCOUNTER — APPOINTMENT (OUTPATIENT)
Dept: INTERNAL MEDICINE | Facility: CLINIC | Age: 70
End: 2020-06-23
Payer: MEDICARE

## 2020-06-23 ENCOUNTER — NON-APPOINTMENT (OUTPATIENT)
Age: 70
End: 2020-06-23

## 2020-06-23 VITALS — SYSTOLIC BLOOD PRESSURE: 118 MMHG | HEART RATE: 70 BPM | DIASTOLIC BLOOD PRESSURE: 74 MMHG | RESPIRATION RATE: 14 BRPM

## 2020-06-23 DIAGNOSIS — H26.9 UNSPECIFIED CATARACT: ICD-10-CM

## 2020-06-23 PROCEDURE — 93000 ELECTROCARDIOGRAM COMPLETE: CPT

## 2020-06-23 PROCEDURE — 99213 OFFICE O/P EST LOW 20 MIN: CPT

## 2020-06-23 NOTE — HISTORY OF PRESENT ILLNESS
[Aortic Stenosis] : no aortic stenosis [Atrial Fibrillation] : no atrial fibrillation [Coronary Artery Disease] : no coronary artery disease [Recent Myocardial Infarction] : no recent myocardial infarction [Implantable Device/Pacemaker] : no implantable device/pacemaker [Asthma] : no asthma [Sleep Apnea] : no sleep apnea [COPD] : no COPD [Smoker] : not a smoker [Self] : no previous adverse anesthesia reaction [Chronic Anticoagulation] : no chronic anticoagulation [Chronic Kidney Disease] : no chronic kidney disease [Diabetes] : no diabetes [(Patient denies any chest pain, claudication, dyspnea on exertion, orthopnea, palpitations or syncope)] : Patient denies any chest pain, claudication, dyspnea on exertion, orthopnea, palpitations or syncope [FreeTextEntry1] : Bilateral cataract extraction [Moderate (4-6 METs)] : Moderate (4-6 METs) [FreeTextEntry2] : July 7 (OD) TBD (OS) [FreeTextEntry3] : Ian Ramirez [FreeTextEntry6] : No bleeding or bruising tendencies.\par  [FreeTextEntry8] : [facility] Activity Status Index predicts a MET of

## 2020-06-23 NOTE — ASSESSMENT
[FreeTextEntry4] : A 70 year female scheduled for cataract extraction.  She is an acceptable risk to proceed with the planned procedure.  Her  chronic diseases are medically optimized.\par \par She  has no history of coronary artery disease.  \par Her  functional capacity is > 4 METS.\par Her  RCRI score is 0\par The estimated risk of cardiac death,nonfatal MI or cardiac arrest is approximately 3.9%.\par The ECG reveals no worrisome findings. No additional cardiac testing is indicated at this time.\par \par \par \par

## 2020-09-17 ENCOUNTER — APPOINTMENT (OUTPATIENT)
Dept: ORTHOPEDIC SURGERY | Facility: CLINIC | Age: 70
End: 2020-09-17
Payer: MEDICARE

## 2020-09-17 VITALS
BODY MASS INDEX: 27.14 KG/M2 | HEART RATE: 78 BPM | WEIGHT: 159 LBS | HEIGHT: 64 IN | SYSTOLIC BLOOD PRESSURE: 121 MMHG | DIASTOLIC BLOOD PRESSURE: 76 MMHG

## 2020-09-17 PROCEDURE — 99203 OFFICE O/P NEW LOW 30 MIN: CPT

## 2020-09-17 PROCEDURE — 73110 X-RAY EXAM OF WRIST: CPT | Mod: LT

## 2020-12-14 ENCOUNTER — INPATIENT (INPATIENT)
Facility: HOSPITAL | Age: 70
LOS: 1 days | Discharge: HOME | DRG: 177 | End: 2020-12-16
Attending: HOSPITALIST | Admitting: STUDENT IN AN ORGANIZED HEALTH CARE EDUCATION/TRAINING PROGRAM
Payer: MEDICARE

## 2020-12-14 VITALS
WEIGHT: 154.98 LBS | HEART RATE: 89 BPM | DIASTOLIC BLOOD PRESSURE: 67 MMHG | TEMPERATURE: 98 F | OXYGEN SATURATION: 95 % | RESPIRATION RATE: 18 BRPM | HEIGHT: 64 IN | SYSTOLIC BLOOD PRESSURE: 123 MMHG

## 2020-12-14 DIAGNOSIS — R09.02 HYPOXEMIA: ICD-10-CM

## 2020-12-14 LAB
ALBUMIN SERPL ELPH-MCNC: 3.8 G/DL — SIGNIFICANT CHANGE UP (ref 3.3–5.2)
ALP SERPL-CCNC: 129 U/L — HIGH (ref 40–120)
ALT FLD-CCNC: 26 U/L — SIGNIFICANT CHANGE UP
ANION GAP SERPL CALC-SCNC: 14 MMOL/L — SIGNIFICANT CHANGE UP (ref 5–17)
APTT BLD: 32.1 SEC — SIGNIFICANT CHANGE UP (ref 27.5–35.5)
AST SERPL-CCNC: 41 U/L — HIGH
BASOPHILS # BLD AUTO: 0.01 K/UL — SIGNIFICANT CHANGE UP (ref 0–0.2)
BASOPHILS NFR BLD AUTO: 0.2 % — SIGNIFICANT CHANGE UP (ref 0–2)
BILIRUB SERPL-MCNC: 0.4 MG/DL — SIGNIFICANT CHANGE UP (ref 0.4–2)
BUN SERPL-MCNC: 22 MG/DL — HIGH (ref 8–20)
CALCIUM SERPL-MCNC: 8.9 MG/DL — SIGNIFICANT CHANGE UP (ref 8.6–10.2)
CHLORIDE SERPL-SCNC: 94 MMOL/L — LOW (ref 98–107)
CO2 SERPL-SCNC: 31 MMOL/L — HIGH (ref 22–29)
CREAT SERPL-MCNC: 0.97 MG/DL — SIGNIFICANT CHANGE UP (ref 0.5–1.3)
CRP SERPL-MCNC: 2.95 MG/DL — HIGH (ref 0–0.4)
EOSINOPHIL # BLD AUTO: 0 K/UL — SIGNIFICANT CHANGE UP (ref 0–0.5)
EOSINOPHIL NFR BLD AUTO: 0 % — SIGNIFICANT CHANGE UP (ref 0–6)
FERRITIN SERPL-MCNC: 252 NG/ML — HIGH (ref 15–150)
GLUCOSE SERPL-MCNC: 113 MG/DL — HIGH (ref 70–99)
HCT VFR BLD CALC: 37.4 % — SIGNIFICANT CHANGE UP (ref 34.5–45)
HGB BLD-MCNC: 11.8 G/DL — SIGNIFICANT CHANGE UP (ref 11.5–15.5)
IMM GRANULOCYTES NFR BLD AUTO: 0.2 % — SIGNIFICANT CHANGE UP (ref 0–1.5)
INR BLD: 1.12 RATIO — SIGNIFICANT CHANGE UP (ref 0.88–1.16)
LDH SERPL L TO P-CCNC: 423 U/L — HIGH (ref 98–192)
LYMPHOCYTES # BLD AUTO: 0.84 K/UL — LOW (ref 1–3.3)
LYMPHOCYTES # BLD AUTO: 18.8 % — SIGNIFICANT CHANGE UP (ref 13–44)
MCHC RBC-ENTMCNC: 27.3 PG — SIGNIFICANT CHANGE UP (ref 27–34)
MCHC RBC-ENTMCNC: 31.6 GM/DL — LOW (ref 32–36)
MCV RBC AUTO: 86.4 FL — SIGNIFICANT CHANGE UP (ref 80–100)
MONOCYTES # BLD AUTO: 0.35 K/UL — SIGNIFICANT CHANGE UP (ref 0–0.9)
MONOCYTES NFR BLD AUTO: 7.8 % — SIGNIFICANT CHANGE UP (ref 2–14)
NEUTROPHILS # BLD AUTO: 3.25 K/UL — SIGNIFICANT CHANGE UP (ref 1.8–7.4)
NEUTROPHILS NFR BLD AUTO: 73 % — SIGNIFICANT CHANGE UP (ref 43–77)
NT-PROBNP SERPL-SCNC: 172 PG/ML — SIGNIFICANT CHANGE UP (ref 0–300)
PLATELET # BLD AUTO: 205 K/UL — SIGNIFICANT CHANGE UP (ref 150–400)
POTASSIUM SERPL-MCNC: 3.5 MMOL/L — SIGNIFICANT CHANGE UP (ref 3.5–5.3)
POTASSIUM SERPL-SCNC: 3.5 MMOL/L — SIGNIFICANT CHANGE UP (ref 3.5–5.3)
PROCALCITONIN SERPL-MCNC: 0.09 NG/ML — SIGNIFICANT CHANGE UP (ref 0.02–0.1)
PROT SERPL-MCNC: 7.2 G/DL — SIGNIFICANT CHANGE UP (ref 6.6–8.7)
PROTHROM AB SERPL-ACNC: 12.9 SEC — SIGNIFICANT CHANGE UP (ref 10.6–13.6)
RBC # BLD: 4.33 M/UL — SIGNIFICANT CHANGE UP (ref 3.8–5.2)
RBC # FLD: 12.9 % — SIGNIFICANT CHANGE UP (ref 10.3–14.5)
SODIUM SERPL-SCNC: 139 MMOL/L — SIGNIFICANT CHANGE UP (ref 135–145)
TROPONIN T SERPL-MCNC: <0.01 NG/ML — SIGNIFICANT CHANGE UP (ref 0–0.06)
WBC # BLD: 4.46 K/UL — SIGNIFICANT CHANGE UP (ref 3.8–10.5)
WBC # FLD AUTO: 4.46 K/UL — SIGNIFICANT CHANGE UP (ref 3.8–10.5)

## 2020-12-14 PROCEDURE — 99285 EMERGENCY DEPT VISIT HI MDM: CPT | Mod: CS

## 2020-12-14 PROCEDURE — 93010 ELECTROCARDIOGRAM REPORT: CPT

## 2020-12-14 PROCEDURE — 71045 X-RAY EXAM CHEST 1 VIEW: CPT | Mod: 26

## 2020-12-14 RX ORDER — METHADONE HYDROCHLORIDE 40 MG/1
15 TABLET ORAL AT BEDTIME
Refills: 0 | Status: DISCONTINUED | OUTPATIENT
Start: 2020-12-14 | End: 2020-12-15

## 2020-12-14 RX ORDER — ROPINIROLE 8 MG/1
1.5 TABLET, FILM COATED, EXTENDED RELEASE ORAL AT BEDTIME
Refills: 0 | Status: DISCONTINUED | OUTPATIENT
Start: 2020-12-14 | End: 2020-12-16

## 2020-12-14 RX ORDER — ACETAMINOPHEN 500 MG
650 TABLET ORAL ONCE
Refills: 0 | Status: COMPLETED | OUTPATIENT
Start: 2020-12-14 | End: 2020-12-14

## 2020-12-14 RX ORDER — ASPIRIN/CALCIUM CARB/MAGNESIUM 324 MG
81 TABLET ORAL DAILY
Refills: 0 | Status: DISCONTINUED | OUTPATIENT
Start: 2020-12-14 | End: 2020-12-16

## 2020-12-14 RX ORDER — MONTELUKAST 4 MG/1
10 TABLET, CHEWABLE ORAL DAILY
Refills: 0 | Status: DISCONTINUED | OUTPATIENT
Start: 2020-12-14 | End: 2020-12-16

## 2020-12-14 RX ORDER — DULOXETINE HYDROCHLORIDE 30 MG/1
30 CAPSULE, DELAYED RELEASE ORAL DAILY
Refills: 0 | Status: DISCONTINUED | OUTPATIENT
Start: 2020-12-14 | End: 2020-12-16

## 2020-12-14 RX ORDER — PANTOPRAZOLE SODIUM 20 MG/1
40 TABLET, DELAYED RELEASE ORAL
Refills: 0 | Status: DISCONTINUED | OUTPATIENT
Start: 2020-12-14 | End: 2020-12-16

## 2020-12-14 RX ORDER — ONDANSETRON 8 MG/1
4 TABLET, FILM COATED ORAL EVERY 6 HOURS
Refills: 0 | Status: DISCONTINUED | OUTPATIENT
Start: 2020-12-14 | End: 2020-12-16

## 2020-12-14 RX ORDER — DEXAMETHASONE 0.5 MG/5ML
6 ELIXIR ORAL DAILY
Refills: 0 | Status: DISCONTINUED | OUTPATIENT
Start: 2020-12-14 | End: 2020-12-16

## 2020-12-14 RX ORDER — ACETAMINOPHEN 500 MG
650 TABLET ORAL EVERY 6 HOURS
Refills: 0 | Status: DISCONTINUED | OUTPATIENT
Start: 2020-12-14 | End: 2020-12-16

## 2020-12-14 RX ORDER — ENOXAPARIN SODIUM 100 MG/ML
40 INJECTION SUBCUTANEOUS DAILY
Refills: 0 | Status: DISCONTINUED | OUTPATIENT
Start: 2020-12-14 | End: 2020-12-16

## 2020-12-14 RX ORDER — FOLIC ACID 0.8 MG
1 TABLET ORAL DAILY
Refills: 0 | Status: DISCONTINUED | OUTPATIENT
Start: 2020-12-14 | End: 2020-12-16

## 2020-12-14 RX ORDER — DEXAMETHASONE 0.5 MG/5ML
10 ELIXIR ORAL ONCE
Refills: 0 | Status: COMPLETED | OUTPATIENT
Start: 2020-12-14 | End: 2020-12-14

## 2020-12-14 RX ORDER — LOSARTAN POTASSIUM 100 MG/1
100 TABLET, FILM COATED ORAL DAILY
Refills: 0 | Status: DISCONTINUED | OUTPATIENT
Start: 2020-12-14 | End: 2020-12-16

## 2020-12-14 RX ORDER — SODIUM CHLORIDE 9 MG/ML
3 INJECTION INTRAMUSCULAR; INTRAVENOUS; SUBCUTANEOUS EVERY 8 HOURS
Refills: 0 | Status: DISCONTINUED | OUTPATIENT
Start: 2020-12-14 | End: 2020-12-16

## 2020-12-14 RX ORDER — ALBUTEROL 90 UG/1
2 AEROSOL, METERED ORAL EVERY 6 HOURS
Refills: 0 | Status: DISCONTINUED | OUTPATIENT
Start: 2020-12-14 | End: 2020-12-16

## 2020-12-14 RX ORDER — GABAPENTIN 400 MG/1
300 CAPSULE ORAL AT BEDTIME
Refills: 0 | Status: DISCONTINUED | OUTPATIENT
Start: 2020-12-14 | End: 2020-12-16

## 2020-12-14 RX ADMIN — Medication 110 MILLIGRAM(S): at 20:35

## 2020-12-14 RX ADMIN — SODIUM CHLORIDE 3 MILLILITER(S): 9 INJECTION INTRAMUSCULAR; INTRAVENOUS; SUBCUTANEOUS at 21:57

## 2020-12-14 RX ADMIN — ROPINIROLE 1.5 MILLIGRAM(S): 8 TABLET, FILM COATED, EXTENDED RELEASE ORAL at 22:47

## 2020-12-14 RX ADMIN — METHADONE HYDROCHLORIDE 15 MILLIGRAM(S): 40 TABLET ORAL at 22:47

## 2020-12-14 RX ADMIN — Medication 10 MILLIGRAM(S): at 20:03

## 2020-12-14 RX ADMIN — GABAPENTIN 300 MILLIGRAM(S): 400 CAPSULE ORAL at 22:48

## 2020-12-14 RX ADMIN — Medication 650 MILLIGRAM(S): at 20:56

## 2020-12-14 RX ADMIN — Medication 650 MILLIGRAM(S): at 19:56

## 2020-12-14 RX ADMIN — Medication 102 MILLIGRAM(S): at 19:56

## 2020-12-14 RX ADMIN — ENOXAPARIN SODIUM 40 MILLIGRAM(S): 100 INJECTION SUBCUTANEOUS at 22:27

## 2020-12-14 NOTE — ED STATDOCS - OBJECTIVE STATEMENT
69 y/o Female with PMHx of GERD, PE, HTN, OA, and Restless leg syndrome presents to ED c/o 1 week of cough, fever and sob. Pt went to urgent care and tested positive for COVID. Associated symptoms of POLLARD. Pt denies cp, abdominal pain, or motor sensory loss. 71 y/o Female with PMHx of GERD, HTN, OA, and Restless leg syndrome presents to ED c/o 1 week of cough, fever and sob. Pt went to urgent care and tested positive for COVID. Associated symptoms of POLLARD. Pt denies cp, abdominal pain, or motor sensory loss.

## 2020-12-14 NOTE — H&P ADULT - HISTORY OF PRESENT ILLNESS
69 y/o female with hx of RLS, s/p TKA 6 weeks ago, HTN, HLD, GERD presents from home after being informed by  that she was positive for COVID. She states she went there c/o malaise, SOB, and dry cough. She has been feeling ill since this past Saturday. Denies any sick contacts or travel besides f/u orthopedic appt. 2 weeks ago. In the ED noted to have RA saturation of 89% with ambulation otherwise 96% at rest. Denies /GI complaints, no CP, rash, or focal weakness.

## 2020-12-14 NOTE — ED ADULT TRIAGE NOTE - CHIEF COMPLAINT QUOTE
patient was diagnosed with covid+ today and was told to have low oxygen, short of breath and dizziness since Saturday

## 2020-12-14 NOTE — ED STATDOCS - ATTENDING CONTRIBUTION TO CARE
I, Juan Simpson, performed the initial face to face bedside interview with this patient regarding history of present illness, review of symptoms and relevant past medical, social and family history.  I completed an independent physical examination.  I was the initial provider who evaluated this patient. I have signed out the follow up of any pending tests (i.e. labs, radiological studies) to the resident.  I have communicated the patient’s plan of care and disposition with the resident  The history, relevant review of systems, past medical and surgical history, medical decision making, and physical examination was documented by the scribe in my presence and I attest to the accuracy of the documentation.

## 2020-12-14 NOTE — ED ADULT NURSE NOTE - OBJECTIVE STATEMENT
Pt received with reports of positive COVID 19 test, SOB, cough and dizziness since Friday. Pt received awake, alert, oriented x4, denies lightheadedness/dizziness at this time, denies headache. Pt with respirations appearing even, unlabored, pt denies SOB at rest at this time. Pt denies chest pain, peripheral pulses palpable with capillary refill <3 seconds. Abdomen soft, nontender, denies N/V/D, difficulty urinating. Pt with knee replacement 6 weeks ago, ambulated with a rolling walker, denies recent falls. Pulse oxygenation 98% on RA at this time. Pt with no apparent acute distress observe zoltan this time. Son at bedside, safety maintained, will continue to monitor.

## 2020-12-14 NOTE — H&P ADULT - PROBLEM SELECTOR PLAN 1
Admit to  bed, start on Decadron, supplemental 02, trend inflammatory markers, prn tylenol, spirometer, OOB, DVT-P, ID eval, isolation

## 2020-12-14 NOTE — ED STATDOCS - PROGRESS NOTE DETAILS
Pt seen and evaluated by me. Agree with hpi/ros/pe as above. Plan for labs, cxr, dex, doxy, tba. - oDmenico Hendrickson, PGY-2 Reviewed all results with pt as well as plans for admission. Pt is comfortable with plan for admission. Questions answered. - Domenico Hendrickson, PGY-2

## 2020-12-14 NOTE — ED STATDOCS - PSH
Removal of pin, plate, abigail, or screw  1991  S/P dilatation and curettage  1981  S/P hip replacement  left  S/P hysterectomy  1982  S/P repair of paraesophageal hernia  2001  S/P shoulder surgery  right  S/P spinal fusion  l 4-l5 1966  S/P spinal surgery  x 2 1985, 1986  Scoliosis  s/p placement of billings abigail x 2 1984

## 2020-12-14 NOTE — H&P ADULT - NSICDXPASTSURGICALHX_GEN_ALL_CORE_FT
PAST SURGICAL HISTORY:  Removal of pin, plate, abigail, or screw 1991    S/P dilatation and curettage 1981    S/P hip replacement left    S/P hysterectomy 1982    S/P repair of paraesophageal hernia 2001    S/P shoulder surgery right    S/P spinal fusion l 4-l5 1966    S/P spinal surgery x 2 1985, 1986    Scoliosis s/p placement of billings abigail x 2 1984

## 2020-12-14 NOTE — H&P ADULT - NSICDXPASTMEDICALHX_GEN_ALL_CORE_FT
PAST MEDICAL HISTORY:  Fungal infection of skin under breast    GERD (gastroesophageal reflux disease)     H/O scoliosis     H/O seasonal allergies     History of pulmonary embolism after billings abigail surgery,1984 treated with coumadin 3 months, no issues after    HTN (hypertension)     OA (osteoarthritis)     Restless leg syndrome

## 2020-12-14 NOTE — ED STATDOCS - CLINICAL SUMMARY MEDICAL DECISION MAKING FREE TEXT BOX
78 y/o female with covid infection most likely pneumonitis. Will obtain cytokine storm lab, CXR and admit.

## 2020-12-14 NOTE — ED STATDOCS - PMH
Fungal infection of skin  under breast  GERD (gastroesophageal reflux disease)    H/O scoliosis    H/O seasonal allergies    History of pulmonary embolism  after billings abigail surgery,1984 treated with coumadin 3 months, no issues after  HTN (hypertension)    OA (osteoarthritis)    Restless leg syndrome

## 2020-12-15 ENCOUNTER — TRANSCRIPTION ENCOUNTER (OUTPATIENT)
Age: 70
End: 2020-12-15

## 2020-12-15 DIAGNOSIS — K21.9 GASTRO-ESOPHAGEAL REFLUX DISEASE WITHOUT ESOPHAGITIS: ICD-10-CM

## 2020-12-15 DIAGNOSIS — I10 ESSENTIAL (PRIMARY) HYPERTENSION: ICD-10-CM

## 2020-12-15 DIAGNOSIS — G25.81 RESTLESS LEGS SYNDROME: ICD-10-CM

## 2020-12-15 DIAGNOSIS — R09.02 HYPOXEMIA: ICD-10-CM

## 2020-12-15 DIAGNOSIS — U07.1 COVID-19: ICD-10-CM

## 2020-12-15 LAB
ALBUMIN SERPL ELPH-MCNC: 4.1 G/DL — SIGNIFICANT CHANGE UP (ref 3.3–5.2)
ALP SERPL-CCNC: 134 U/L — HIGH (ref 40–120)
ALT FLD-CCNC: 27 U/L — SIGNIFICANT CHANGE UP
ANION GAP SERPL CALC-SCNC: 13 MMOL/L — SIGNIFICANT CHANGE UP (ref 5–17)
ANION GAP SERPL CALC-SCNC: 16 MMOL/L — SIGNIFICANT CHANGE UP (ref 5–17)
AST SERPL-CCNC: 31 U/L — SIGNIFICANT CHANGE UP
BASOPHILS # BLD AUTO: 0 K/UL — SIGNIFICANT CHANGE UP (ref 0–0.2)
BASOPHILS NFR BLD AUTO: 0 % — SIGNIFICANT CHANGE UP (ref 0–2)
BILIRUB DIRECT SERPL-MCNC: 0.1 MG/DL — SIGNIFICANT CHANGE UP (ref 0–0.3)
BILIRUB INDIRECT FLD-MCNC: 0.3 MG/DL — SIGNIFICANT CHANGE UP (ref 0.2–1)
BILIRUB SERPL-MCNC: 0.4 MG/DL — SIGNIFICANT CHANGE UP (ref 0.4–2)
BUN SERPL-MCNC: 26 MG/DL — HIGH (ref 8–20)
BUN SERPL-MCNC: 27 MG/DL — HIGH (ref 8–20)
CALCIUM SERPL-MCNC: 8.9 MG/DL — SIGNIFICANT CHANGE UP (ref 8.6–10.2)
CALCIUM SERPL-MCNC: 9.2 MG/DL — SIGNIFICANT CHANGE UP (ref 8.6–10.2)
CHLORIDE SERPL-SCNC: 93 MMOL/L — LOW (ref 98–107)
CHLORIDE SERPL-SCNC: 97 MMOL/L — LOW (ref 98–107)
CO2 SERPL-SCNC: 29 MMOL/L — SIGNIFICANT CHANGE UP (ref 22–29)
CO2 SERPL-SCNC: 31 MMOL/L — HIGH (ref 22–29)
CREAT SERPL-MCNC: 0.81 MG/DL — SIGNIFICANT CHANGE UP (ref 0.5–1.3)
CREAT SERPL-MCNC: 0.83 MG/DL — SIGNIFICANT CHANGE UP (ref 0.5–1.3)
CREAT SERPL-MCNC: 0.91 MG/DL — SIGNIFICANT CHANGE UP (ref 0.5–1.3)
EOSINOPHIL # BLD AUTO: 0 K/UL — SIGNIFICANT CHANGE UP (ref 0–0.5)
EOSINOPHIL NFR BLD AUTO: 0 % — SIGNIFICANT CHANGE UP (ref 0–6)
GLUCOSE SERPL-MCNC: 116 MG/DL — HIGH (ref 70–99)
GLUCOSE SERPL-MCNC: 123 MG/DL — HIGH (ref 70–99)
HCT VFR BLD CALC: 38.9 % — SIGNIFICANT CHANGE UP (ref 34.5–45)
HCV AB S/CO SERPL IA: 0.1 S/CO — SIGNIFICANT CHANGE UP (ref 0–0.99)
HCV AB SERPL-IMP: SIGNIFICANT CHANGE UP
HGB BLD-MCNC: 12.3 G/DL — SIGNIFICANT CHANGE UP (ref 11.5–15.5)
IMM GRANULOCYTES NFR BLD AUTO: 0.4 % — SIGNIFICANT CHANGE UP (ref 0–1.5)
LYMPHOCYTES # BLD AUTO: 0.83 K/UL — LOW (ref 1–3.3)
LYMPHOCYTES # BLD AUTO: 31.9 % — SIGNIFICANT CHANGE UP (ref 13–44)
MAGNESIUM SERPL-MCNC: 1.9 MG/DL — SIGNIFICANT CHANGE UP (ref 1.8–2.6)
MCHC RBC-ENTMCNC: 27.5 PG — SIGNIFICANT CHANGE UP (ref 27–34)
MCHC RBC-ENTMCNC: 31.6 GM/DL — LOW (ref 32–36)
MCV RBC AUTO: 87 FL — SIGNIFICANT CHANGE UP (ref 80–100)
MONOCYTES # BLD AUTO: 0.1 K/UL — SIGNIFICANT CHANGE UP (ref 0–0.9)
MONOCYTES NFR BLD AUTO: 3.8 % — SIGNIFICANT CHANGE UP (ref 2–14)
NEUTROPHILS # BLD AUTO: 1.66 K/UL — LOW (ref 1.8–7.4)
NEUTROPHILS NFR BLD AUTO: 63.9 % — SIGNIFICANT CHANGE UP (ref 43–77)
PHOSPHATE SERPL-MCNC: 3.5 MG/DL — SIGNIFICANT CHANGE UP (ref 2.4–4.7)
PLATELET # BLD AUTO: 231 K/UL — SIGNIFICANT CHANGE UP (ref 150–400)
POTASSIUM SERPL-MCNC: 2.9 MMOL/L — CRITICAL LOW (ref 3.5–5.3)
POTASSIUM SERPL-MCNC: 4.2 MMOL/L — SIGNIFICANT CHANGE UP (ref 3.5–5.3)
POTASSIUM SERPL-SCNC: 2.9 MMOL/L — CRITICAL LOW (ref 3.5–5.3)
POTASSIUM SERPL-SCNC: 4.2 MMOL/L — SIGNIFICANT CHANGE UP (ref 3.5–5.3)
PROT SERPL-MCNC: 7.6 G/DL — SIGNIFICANT CHANGE UP (ref 6.6–8.7)
RBC # BLD: 4.47 M/UL — SIGNIFICANT CHANGE UP (ref 3.8–5.2)
RBC # FLD: 13 % — SIGNIFICANT CHANGE UP (ref 10.3–14.5)
SARS-COV-2 RNA SPEC QL NAA+PROBE: DETECTED
SODIUM SERPL-SCNC: 139 MMOL/L — SIGNIFICANT CHANGE UP (ref 135–145)
SODIUM SERPL-SCNC: 139 MMOL/L — SIGNIFICANT CHANGE UP (ref 135–145)
WBC # BLD: 2.6 K/UL — LOW (ref 3.8–10.5)
WBC # FLD AUTO: 2.6 K/UL — LOW (ref 3.8–10.5)

## 2020-12-15 PROCEDURE — 99222 1ST HOSP IP/OBS MODERATE 55: CPT | Mod: CS

## 2020-12-15 PROCEDURE — 99223 1ST HOSP IP/OBS HIGH 75: CPT | Mod: CS

## 2020-12-15 RX ORDER — METHADONE HYDROCHLORIDE 40 MG/1
10 TABLET ORAL EVERY 6 HOURS
Refills: 0 | Status: DISCONTINUED | OUTPATIENT
Start: 2020-12-15 | End: 2020-12-16

## 2020-12-15 RX ORDER — REMDESIVIR 5 MG/ML
200 INJECTION INTRAVENOUS EVERY 24 HOURS
Refills: 0 | Status: COMPLETED | OUTPATIENT
Start: 2020-12-15 | End: 2020-12-15

## 2020-12-15 RX ORDER — POTASSIUM CHLORIDE 20 MEQ
40 PACKET (EA) ORAL ONCE
Refills: 0 | Status: COMPLETED | OUTPATIENT
Start: 2020-12-15 | End: 2020-12-15

## 2020-12-15 RX ORDER — POTASSIUM CHLORIDE 20 MEQ
40 PACKET (EA) ORAL EVERY 4 HOURS
Refills: 0 | Status: COMPLETED | OUTPATIENT
Start: 2020-12-15 | End: 2020-12-15

## 2020-12-15 RX ORDER — BENZOCAINE AND MENTHOL 5; 1 G/100ML; G/100ML
1 LIQUID ORAL EVERY 4 HOURS
Refills: 0 | Status: DISCONTINUED | OUTPATIENT
Start: 2020-12-15 | End: 2020-12-16

## 2020-12-15 RX ORDER — REMDESIVIR 5 MG/ML
100 INJECTION INTRAVENOUS EVERY 24 HOURS
Refills: 0 | Status: DISCONTINUED | OUTPATIENT
Start: 2020-12-16 | End: 2020-12-16

## 2020-12-15 RX ORDER — MORPHINE SULFATE 50 MG/1
0.5 CAPSULE, EXTENDED RELEASE ORAL ONCE
Refills: 0 | Status: DISCONTINUED | OUTPATIENT
Start: 2020-12-15 | End: 2020-12-15

## 2020-12-15 RX ORDER — REMDESIVIR 5 MG/ML
INJECTION INTRAVENOUS
Refills: 0 | Status: DISCONTINUED | OUTPATIENT
Start: 2020-12-15 | End: 2020-12-16

## 2020-12-15 RX ORDER — POTASSIUM CHLORIDE 20 MEQ
10 PACKET (EA) ORAL
Refills: 0 | Status: DISCONTINUED | OUTPATIENT
Start: 2020-12-15 | End: 2020-12-15

## 2020-12-15 RX ADMIN — METHADONE HYDROCHLORIDE 10 MILLIGRAM(S): 40 TABLET ORAL at 23:58

## 2020-12-15 RX ADMIN — Medication 100 MILLIEQUIVALENT(S): at 10:09

## 2020-12-15 RX ADMIN — Medication 40 MILLIEQUIVALENT(S): at 10:09

## 2020-12-15 RX ADMIN — GABAPENTIN 300 MILLIGRAM(S): 400 CAPSULE ORAL at 22:54

## 2020-12-15 RX ADMIN — METHADONE HYDROCHLORIDE 10 MILLIGRAM(S): 40 TABLET ORAL at 17:25

## 2020-12-15 RX ADMIN — Medication 81 MILLIGRAM(S): at 12:22

## 2020-12-15 RX ADMIN — SODIUM CHLORIDE 3 MILLILITER(S): 9 INJECTION INTRAMUSCULAR; INTRAVENOUS; SUBCUTANEOUS at 22:54

## 2020-12-15 RX ADMIN — SODIUM CHLORIDE 3 MILLILITER(S): 9 INJECTION INTRAMUSCULAR; INTRAVENOUS; SUBCUTANEOUS at 13:32

## 2020-12-15 RX ADMIN — ROPINIROLE 1.5 MILLIGRAM(S): 8 TABLET, FILM COATED, EXTENDED RELEASE ORAL at 22:54

## 2020-12-15 RX ADMIN — REMDESIVIR 500 MILLIGRAM(S): 5 INJECTION INTRAVENOUS at 17:25

## 2020-12-15 RX ADMIN — PANTOPRAZOLE SODIUM 40 MILLIGRAM(S): 20 TABLET, DELAYED RELEASE ORAL at 05:44

## 2020-12-15 RX ADMIN — Medication 1 MILLIGRAM(S): at 12:22

## 2020-12-15 RX ADMIN — MONTELUKAST 10 MILLIGRAM(S): 4 TABLET, CHEWABLE ORAL at 12:22

## 2020-12-15 RX ADMIN — SODIUM CHLORIDE 3 MILLILITER(S): 9 INJECTION INTRAMUSCULAR; INTRAVENOUS; SUBCUTANEOUS at 05:40

## 2020-12-15 RX ADMIN — LOSARTAN POTASSIUM 100 MILLIGRAM(S): 100 TABLET, FILM COATED ORAL at 05:43

## 2020-12-15 RX ADMIN — BENZOCAINE AND MENTHOL 1 LOZENGE: 5; 1 LIQUID ORAL at 22:54

## 2020-12-15 RX ADMIN — ENOXAPARIN SODIUM 40 MILLIGRAM(S): 100 INJECTION SUBCUTANEOUS at 12:22

## 2020-12-15 RX ADMIN — DULOXETINE HYDROCHLORIDE 30 MILLIGRAM(S): 30 CAPSULE, DELAYED RELEASE ORAL at 12:22

## 2020-12-15 RX ADMIN — Medication 6 MILLIGRAM(S): at 05:40

## 2020-12-15 RX ADMIN — Medication 40 MILLIEQUIVALENT(S): at 14:03

## 2020-12-15 NOTE — PROGRESS NOTE ADULT - ASSESSMENT
69 y/o female with Hx of HTN, RLS, GERD admitted wtih COVID PNA and hypoxia.     1. COVID-19  - On room air, not requiring supplemental O2  - C/w decadron, hold remdesivir for now give no hypoxia  - ID consult pending  - Trend inflammatory markers  - PRN tylenol   - OOB, spirometer  - DVT PPE    2. Hypokalemia  - K 2.9  - Denies diarrhea, vomiting. No diuretics. Mg normal  - Will replete. Repeat BMP at 3pm and in am    3. Hypertension  -  DASH diet, continue outpatient regimen    4. GERD  - PPI    5.  Restless leg syndrome  - Continue outpatient regimen with Neurontin, Requip, Methadone  - Walks w/ walker at home per pt    DISPO: DC tomorrow pending am labs/improvement in hypokalemia

## 2020-12-15 NOTE — CONSULT NOTE ADULT - ASSESSMENT
69 y/o female with hx of RLS, s/p TKA 6 weeks ago, HTN, HLD, GERD presents from home after being informed by  that she was positive for COVID. She states she went there c/o malaise, SOB, and dry cough. She has been feeling ill since this past Saturday. Denies any sick contacts or travel besides f/u orthopedic appt. 2 weeks ago. In the ED noted to have RA saturation of 89% with ambulation otherwise 96% at rest. Denies /GI complaints, no CP, rash, or focal weakness.  (14 Dec 2020 23:47)    reports that her son was sick with COVID-19.  limited contact.  Her son brings food ot them.   Son has Crohn's disease and is at home.     discussion/ Impression:  COVID-19 infection   Viral pneumonia  shortness of breath  lung infiltrates  dependence on oxygen    Plan:    Lung xray single view, without evidence of infiltrates, but clinically with hypoxia  should repeat xray in 24- 48 hours      - continue Remdesivir IV daily.   will plan for a 5 day course.     May need a 10 day course if little improvement.      - continue dexamethasone 6mg daily. x 10 days    - Continue supportive care measures  - continue Oxygenation as needed;  CONTINUE to titrate down as tolerated  - self- proning  as tolerated  - ENCOURAGED OOB to chair  - encouraged incentive spirometry       continue to trend inflammatory markers.   If procalcitonin starts to rise, may need to consider treating for secondary bacterial infections    - trend CBC with diff, CMP with LFT's  - trend Inflammatory markers (ESR, CRP, Ferritin, LDH,  procalcitonin)  q48h.  D dimer, lactate, troponin, CK, PT/PTT x 1      Will follow with you.

## 2020-12-15 NOTE — CONSULT NOTE ADULT - SUBJECTIVE AND OBJECTIVE BOX
[facility]  INFECTIOUS DISEASES AND INTERNAL MEDICINE at [place]  =======================================================  Ricadro Vasques MD  Diplomates American Board of Internal Medicine and Infectious Diseases  Tel  [phone]  Fax [phone]  =======================================================    [facility]-[id number]  [place]   HPI:  69 y/o female with hx of RLS, s/p TKA 6 weeks ago, HTN, HLD, GERD presents from home after being informed by  that she was positive for COVID. She states she went there c/o malaise, SOB, and dry cough. She has been feeling ill since this past Saturday. Denies any sick contacts or travel besides f/u orthopedic appt. 2 weeks ago. In the ED noted to have RA saturation of 89% with ambulation otherwise 96% at rest. Denies /GI complaints, no CP, rash, or focal weakness.  (14 Dec 2020 23:47)    reports that her son was sick with COVID-19.  limited contact.  Her son brings food ot them.   Son has Crohn's disease and is at home.     I have personally reviewed the labs and data; pertinent labs and data are listed in this note; please see below.   =======================================================  Past Medical & Surgical Hx:  =====================  PAST MEDICAL & SURGICAL HISTORY:  H/O scoliosis    Fungal infection of skin  under breast    OA (osteoarthritis)    HTN (hypertension)    GERD (gastroesophageal reflux disease)    Restless leg syndrome    History of pulmonary embolism  after billings abigail surgery,1984 treated with coumadin 3 months, no issues after    H/O seasonal allergies    S/P dilatation and curettage  1981    S/P shoulder surgery  right    S/P hip replacement  left    S/P hysterectomy  1982    Removal of pin, plate, abigail, or screw  1991    S/P spinal surgery  x 2 1985, 1986    Scoliosis  s/p placement of billings abigail x 2 1984    S/P spinal fusion  l 4-l5 1966    S/P repair of paraesophageal hernia  2001      Problem List:  ==========  HEALTH ISSUES - PROBLEM Dx:  Restless leg syndrome  Restless leg syndrome    Gastroesophageal reflux disease, unspecified whether esophagitis present  Gastroesophageal reflux disease, unspecified whether esophagitis present    Hypertension, unspecified type  Hypertension, unspecified type    Hypoxia  Hypoxia    COVID-19  COVID-19       Social Hx:  =======  no toxic habits currently    FAMILY HISTORY:  no significant family history of immunosuppressive disorders in mother or father   =======================================================    REVIEW OF SYSTEMS:  CONSTITUTIONAL:  No Fever or chills  HEENT:  No diplopia or blurred vision.  No earache, sore throat or runny nose.  CARDIOVASCULAR:  No pressure, squeezing, strangling, tightness, heaviness or aching about the chest, neck, axilla or epigastrium.  RESPIRATORY:  POSITIVE cough, shortness of breath  GASTROINTESTINAL:  No nausea, vomiting or diarrhea.  GENITOURINARY:  No dysuria, frequency or urgency. No Blood in urine  MUSCULOSKELETAL:  no joint aches, no muscle pain  SKIN:  No change in skin, hair or nails.  NEUROLOGIC:  No Headaches, seizures or weakness.  PSYCHIATRIC:  No disorder of thought or mood.  ENDOCRINE:  No heat or cold intolerance  HEMATOLOGICAL:  No easy bruising or bleeding.    =======================================================  Allergies    Dilantin (Urticaria)  erythromycin (Nausea)  penicillins (Urticaria)    Intolerances    Antibiotics:  remdesivir  IVPB   IV Intermittent   remdesivir  IVPB 200 milliGRAM(s) IV Intermittent every 24 hours    Other medications:  aspirin enteric coated 81 milliGRAM(s) Oral daily  dexAMETHasone  Injectable 6 milliGRAM(s) IV Push daily  DULoxetine 30 milliGRAM(s) Oral daily  enoxaparin Injectable 40 milliGRAM(s) SubCutaneous daily  folic acid 1 milliGRAM(s) Oral daily  gabapentin 300 milliGRAM(s) Oral at bedtime  losartan 100 milliGRAM(s) Oral daily  methadone    Tablet 10 milliGRAM(s) Oral every 6 hours  montelukast 10 milliGRAM(s) Oral daily  pantoprazole    Tablet 40 milliGRAM(s) Oral before breakfast  potassium chloride    Tablet ER 40 milliEquivalent(s) Oral every 4 hours  rOPINIRole 1.5 milliGRAM(s) Oral at bedtime  sodium chloride 0.9% lock flush 3 milliLiter(s) IV Push every 8 hours     doxycycline IVPB   110 mL/Hr IV Intermittent (12-14-20 @ 20:35)      ======================================================  Physical Exam:  ============  T(F): 98 (15 Dec 2020 07:38), Max: 98.5 (14 Dec 2020 22:25)  HR: 63 (15 Dec 2020 07:38)  BP: 143/87 (15 Dec 2020 07:38)  RR: 20 (15 Dec 2020 07:38)  SpO2: 94% (15 Dec 2020 07:38) (86% - 97%)  temp max in last 48H T(F): , Max: 98.5 (12-14-20 @ 22:25)Height (cm): 162.6 (12-14-20 @ 18:29)  Weight (kg): 70.3 (12-14-20 @ 18:29)  BMI (kg/m2): 26.6 (12-14-20 @ 18:29)  BSA (m2): 1.76 (12-14-20 @ 18:29)    General:  No acute distress.  Eye: Pupils are equal, round and reactive to light, Extraocular movements are intact, Normal conjunctiva.  HENT: Normocephalic, Oral mucosa is moist, No pharyngeal erythema, No sinus tenderness.  Neck: Supple, No lymphadenopathy.  Respiratory: Lungs are clear to auscultation, Respirations are non-labored.  Cardiovascular: Normal rate, Regular rhythm,   Gastrointestinal: Soft, Non-tender, Non-distended, Normal bowel sounds.  Genitourinary: No costovertebral angle tenderness.  Lymphatics: No lymphadenopathy neck,   Musculoskeletal: Normal range of motion, Normal strength.  Integumentary: No rash.  Neurologic: Alert, Oriented, No focal deficits, Cranial Nerves II-XII are grossly intact.  Psychiatric: Appropriate mood & affect.    =======================================================  Labs:                        12.3   2.60  )-----------( 231      ( 15 Dec 2020 07:40 )             38.9      12-15    x   |  x   |  x   ----------------------------<  x   x    |  x   |  0.83    Ca    8.9      15 Dec 2020 07:40  Phos  3.5     12-15  Mg     1.9     12-15    TPro  7.6  /  Alb  4.1  /  TBili  0.4  /  DBili  0.1  /  AST  31  /  ALT  27  /  AlkPhos  134<H>  12-15      Creatinine, Serum: 0.83 mg/dL (12-15-20 @ 11:50)  Creatinine, Serum: 0.91 mg/dL (12-15-20 @ 07:40)  Creatinine, Serum: 0.97 mg/dL (12-14-20 @ 20:14)    C-Reactive Protein, Serum: 2.95 mg/dL (12-14-20 @ 20:14)      Procalcitonin, Serum: 0.09 ng/mL (12-14-20 @ 20:14)      COVID-19 PCR: Detected (12-14-20 @ 20:14)        [facility]  INFECTIOUS DISEASES AND INTERNAL MEDICINE at [place]  =======================================================  Ricardo Vasques MD  Diplomates American Board of Internal Medicine and Infectious Diseases  Tel  [phone]  Fax [phone]  =======================================================    [facility]-[id number]  [place]   HPI:  69 y/o female with hx of RLS, s/p TKA 6 weeks ago, HTN, HLD, GERD presents from home after being informed by  that she was positive for COVID. She states she went there c/o malaise, SOB, and dry cough. She has been feeling ill since this past Saturday. Denies any sick contacts or travel besides f/u orthopedic appt. 2 weeks ago. In the ED noted to have RA saturation of 89% with ambulation otherwise 96% at rest. Denies /GI complaints, no CP, rash, or focal weakness.  (14 Dec 2020 23:47)    reports that her son was sick with COVID-19.  limited contact.  Her son brings food ot them.   Son has Crohn's disease and is at home.     I have personally reviewed the labs and data; pertinent labs and data are listed in this note; please see below.   =======================================================  Past Medical & Surgical Hx:  =====================  PAST MEDICAL & SURGICAL HISTORY:  H/O scoliosis    Fungal infection of skin  under breast    OA (osteoarthritis)    HTN (hypertension)    GERD (gastroesophageal reflux disease)    Restless leg syndrome    History of pulmonary embolism  after billings abigail surgery,1984 treated with coumadin 3 months, no issues after    H/O seasonal allergies    S/P dilatation and curettage  1981    S/P shoulder surgery  right    S/P hip replacement  left    S/P hysterectomy  1982    Removal of pin, plate, abigail, or screw  1991    S/P spinal surgery  x 2 1985, 1986    Scoliosis  s/p placement of billings abigail x 2 1984    S/P spinal fusion  l 4-l5 1966    S/P repair of paraesophageal hernia  2001      Problem List:  ==========  HEALTH ISSUES - PROBLEM Dx:  Restless leg syndrome  Restless leg syndrome    Gastroesophageal reflux disease, unspecified whether esophagitis present  Gastroesophageal reflux disease, unspecified whether esophagitis present    Hypertension, unspecified type  Hypertension, unspecified type    Hypoxia  Hypoxia    COVID-19  COVID-19       Social Hx:  =======  no toxic habits currently    FAMILY HISTORY:  no significant family history of immunosuppressive disorders in mother or father   =======================================================    REVIEW OF SYSTEMS:  CONSTITUTIONAL:  No Fever or chills  HEENT:  No diplopia or blurred vision.  No earache, sore throat or runny nose.  CARDIOVASCULAR:  No pressure, squeezing, strangling, tightness, heaviness or aching about the chest, neck, axilla or epigastrium.  RESPIRATORY:  POSITIVE cough, shortness of breath  GASTROINTESTINAL:  No nausea, vomiting or diarrhea.  GENITOURINARY:  No dysuria, frequency or urgency. No Blood in urine  MUSCULOSKELETAL:  no joint aches, no muscle pain  SKIN:  No change in skin, hair or nails.  NEUROLOGIC:  No Headaches, seizures or weakness.  PSYCHIATRIC:  No disorder of thought or mood.  ENDOCRINE:  No heat or cold intolerance  HEMATOLOGICAL:  No easy bruising or bleeding.    =======================================================  Allergies    Dilantin (Urticaria)  erythromycin (Nausea)  penicillins (Urticaria)    Intolerances    Antibiotics:  remdesivir  IVPB   IV Intermittent   remdesivir  IVPB 200 milliGRAM(s) IV Intermittent every 24 hours    Other medications:  aspirin enteric coated 81 milliGRAM(s) Oral daily  dexAMETHasone  Injectable 6 milliGRAM(s) IV Push daily  DULoxetine 30 milliGRAM(s) Oral daily  enoxaparin Injectable 40 milliGRAM(s) SubCutaneous daily  folic acid 1 milliGRAM(s) Oral daily  gabapentin 300 milliGRAM(s) Oral at bedtime  losartan 100 milliGRAM(s) Oral daily  methadone    Tablet 10 milliGRAM(s) Oral every 6 hours  montelukast 10 milliGRAM(s) Oral daily  pantoprazole    Tablet 40 milliGRAM(s) Oral before breakfast  potassium chloride    Tablet ER 40 milliEquivalent(s) Oral every 4 hours  rOPINIRole 1.5 milliGRAM(s) Oral at bedtime  sodium chloride 0.9% lock flush 3 milliLiter(s) IV Push every 8 hours     doxycycline IVPB   110 mL/Hr IV Intermittent (12-14-20 @ 20:35)      ======================================================  Physical Exam:  ============  T(F): 98 (15 Dec 2020 07:38), Max: 98.5 (14 Dec 2020 22:25)  HR: 63 (15 Dec 2020 07:38)  BP: 143/87 (15 Dec 2020 07:38)  RR: 20 (15 Dec 2020 07:38)  SpO2: 94% (15 Dec 2020 07:38) (86% - 97%)  temp max in last 48H T(F): , Max: 98.5 (12-14-20 @ 22:25)Height (cm): 162.6 (12-14-20 @ 18:29)  Weight (kg): 70.3 (12-14-20 @ 18:29)  BMI (kg/m2): 26.6 (12-14-20 @ 18:29)  BSA (m2): 1.76 (12-14-20 @ 18:29)    General:  No acute distress.  THIN  Eye: Pupils are equal, round and reactive to light, Extraocular movements are intact, Normal conjunctiva.  HENT: Normocephalic, Oral mucosa is moist, No pharyngeal erythema, No sinus tenderness.  Neck: Supple, No lymphadenopathy.  Respiratory: Lungs with poor air entry, coarse rhonchi at bases  Cardiovascular: Normal rate, Regular rhythm,   Gastrointestinal: Soft, Non-tender, Non-distended, Normal bowel sounds.  Genitourinary: No costovertebral angle tenderness.  Lymphatics: No lymphadenopathy neck,   Musculoskeletal: Normal range of motion, Normal strength.  Integumentary: No rash.  Neurologic: Alert, Oriented, No focal deficits, Cranial Nerves II-XII are grossly intact.  Psychiatric: Appropriate mood & affect.    =======================================================  Labs:                        12.3   2.60  )-----------( 231      ( 15 Dec 2020 07:40 )             38.9      12-15    x   |  x   |  x   ----------------------------<  x   x    |  x   |  0.83    Ca    8.9      15 Dec 2020 07:40  Phos  3.5     12-15  Mg     1.9     12-15    TPro  7.6  /  Alb  4.1  /  TBili  0.4  /  DBili  0.1  /  AST  31  /  ALT  27  /  AlkPhos  134<H>  12-15      Creatinine, Serum: 0.83 mg/dL (12-15-20 @ 11:50)  Creatinine, Serum: 0.91 mg/dL (12-15-20 @ 07:40)  Creatinine, Serum: 0.97 mg/dL (12-14-20 @ 20:14)    C-Reactive Protein, Serum: 2.95 mg/dL (12-14-20 @ 20:14)      Procalcitonin, Serum: 0.09 ng/mL (12-14-20 @ 20:14)      COVID-19 PCR: Detected (12-14-20 @ 20:14)        < from: Xray Chest 1 View- PORTABLE-Urgent (12.14.20 @ 19:55) >     EXAM:  XR CHEST PORTABLE URGENT 1V                          PROCEDURE DATE:  12/14/2020          INTERPRETATION:  TECHNIQUE: Single portable view of the chest.    COMPARISON: 2/17/2013    CLINICAL HISTORY: Shortness of Breath, Cough,  Fever    FINDINGS:    Single frontal view of the chest demonstrates the lungs to be clear. The cardiomediastinal silhouette is normal. No acute osseous abnormalities.    IMPRESSION: No acute cardiopulmonary disease process.           CALEB CRAIN MD; Attending Radiologist  This document has been electronically signed. Dec 15 2020  9:26AM    < end of copied text >

## 2020-12-15 NOTE — DISCHARGE NOTE PROVIDER - NSDCFUADDINST_GEN_ALL_CORE_FT
You have tested POSITIVE for the novel coronavirus (COVID-19). Upon discharge, you must self-quarantine for 14 days, or until the Department of Health contacts you.  You should restrict activities outside of your home except for getting medical care. Do not go to work, school or public areas. Avoid using public transportation. Please wear a face mask if you are around other individuals. Try to avoid contact with house members, family, and friends for the duration of this quarantine. Please follow up with your primary care physician within 2-3 weeks of your discharge from [facility]. Please take all medications as prescribed. If you experience any worsening or recurrence of your symptoms, particularly worsening or high fever, shortness of breathe, extreme fatigue, or bloody cough please call 9-1-1 immediately or report to the nearest Emergency Department. If you have any questions or concerns, please do not hesitate to call the hospital at [phone].

## 2020-12-15 NOTE — DISCHARGE NOTE PROVIDER - NSDCMRMEDTOKEN_GEN_ALL_CORE_FT
Aspirin Enteric Coated 81 mg oral delayed release tablet: 1 tab(s) orally once a day  chlorthalidone 25 mg oral tablet: 1 tab(s) orally once a day  Cymbalta 30 mg oral delayed release capsule: 1 cap(s) orally 2 times a day  methadone 5 mg oral tablet: 3 tab(s) orally once a day (at bedtime)  Neurontin 300 mg oral capsule: 1 tab(s) orally once a day (at bedtime)  Protonix 40 mg oral delayed release tablet: 1 tab(s) orally once a day  Requip 1 mg oral tablet: 1.5 milligram(s) orally once a day (at bedtime)  Singulair 10 mg oral tablet: 1 tab(s) orally once a day  tiZANidine:    Aspirin Enteric Coated 81 mg oral delayed release tablet: 1 tab(s) orally once a day  chlorthalidone 25 mg oral tablet: 1 tab(s) orally once a day  Cymbalta 30 mg oral delayed release capsule: 1 cap(s) orally 2 times a day  losartan 100 mg oral tablet: 1 tab(s) orally once a day  methadone 5 mg oral tablet: 3 tab(s) orally once a day (at bedtime)  Neurontin 300 mg oral capsule: 1 tab(s) orally once a day (at bedtime)  Protonix 40 mg oral delayed release tablet: 1 tab(s) orally once a day  Requip 1 mg oral tablet: 1.5 milligram(s) orally once a day (at bedtime)  Singulair 10 mg oral tablet: 1 tab(s) orally once a day  tiZANidine:    Aspir 81 oral delayed release tablet: 1 tab(s) orally once a day   Aspirin Enteric Coated 81 mg oral delayed release tablet: 1 tab(s) orally once a day  chlorthalidone 25 mg oral tablet: 1 tab(s) orally once a day  Cymbalta 30 mg oral delayed release capsule: 1 cap(s) orally 2 times a day  losartan 100 mg oral tablet: 1 tab(s) orally once a day  methadone 5 mg oral tablet: 3 tab(s) orally once a day (at bedtime)  Neurontin 300 mg oral capsule: 1 tab(s) orally once a day (at bedtime)  Protonix 40 mg oral delayed release tablet: 1 tab(s) orally once a day  Requip 1 mg oral tablet: 1.5 milligram(s) orally once a day (at bedtime)  Singulair 10 mg oral tablet: 1 tab(s) orally once a day  tiZANidine:

## 2020-12-15 NOTE — PROGRESS NOTE ADULT - SUBJECTIVE AND OBJECTIVE BOX
Patient is a 70y old  Female who presents with a chief complaint of COVID Pos.  Hypoxia (14 Dec 2020 23:47)       Pt seen and examined at bedside  States feels fine, no specific complaints  Currently on room air, maintaining sats. Does not require home o2, 96% on RA on ambulation  ROS neg   VSS on RA    Vital Signs Last 24 Hrs  T(C): 36.7 (15 Dec 2020 07:38), Max: 36.9 (14 Dec 2020 22:25)  T(F): 98 (15 Dec 2020 07:38), Max: 98.5 (14 Dec 2020 22:25)  HR: 63 (15 Dec 2020 07:38) (63 - 89)  BP: 143/87 (15 Dec 2020 07:38) (110/53 - 147/70)  BP(mean): 80 (14 Dec 2020 23:47) (80 - 80)  RR: 20 (15 Dec 2020 07:38) (18 - 24)  SpO2: 94% (15 Dec 2020 07:38) (86% - 97%) ON RA    PHYSICAL EXAM:  GENERAL: NAD, speaking full sentences  HEAD:  Atraumatic, Normocephalic  EYES: EOMI, PERRLA, conjunctiva and sclera clear  NECK: Supple, No JVD, Normal thyroid  NERVOUS SYSTEM:  Alert & Oriented X3, Good concentration   CHEST/LUNG: Clear to auscultation bilaterally; No rales, rhonchi, wheezing, or rubs  HEART: Regular rate and rhythm; No murmurs, rubs, or gallops  ABDOMEN: Soft, Nontender, Nondistended; Bowel sounds present  EXTREMITIES:  2+ Peripheral Pulses, No clubbing, cyanosis, or edema      LABS/IMAGING: REVIEWED        Patient is a 70y old  Female who presents with a chief complaint of COVID Pos.  Hypoxia (14 Dec 2020 23:47)       Pt seen and examined at bedside  States feels fine, no specific complaints  Currently on room air, maintaining sats. Does not require home o2, 96% on RA on ambulation  ROS neg   VSS on RA    Vital Signs Last 24 Hrs  T(C): 36.7 (15 Dec 2020 07:38), Max: 36.9 (14 Dec 2020 22:25)  T(F): 98 (15 Dec 2020 07:38), Max: 98.5 (14 Dec 2020 22:25)  HR: 63 (15 Dec 2020 07:38) (63 - 89)  BP: 143/87 (15 Dec 2020 07:38) (110/53 - 147/70)  BP(mean): 80 (14 Dec 2020 23:47) (80 - 80)  RR: 20 (15 Dec 2020 07:38) (18 - 24)  SpO2: 94% (15 Dec 2020 07:38) (86% - 97%) ON RA  MEDICATIONS  (PRN):  acetaminophen   Tablet .. 650 milliGRAM(s) Oral every 6 hours PRN Temp greater or equal to 38C (100.4F), Mild Pain (1 - 3)  ALBUTerol    90 MICROgram(s) HFA Inhaler 2 Puff(s) Inhalation every 6 hours PRN Shortness of Breath and/or Wheezing  ondansetron Injectable 4 milliGRAM(s) IV Push every 6 hours PRN Nausea    PHYSICAL EXAM:  GENERAL: NAD, speaking full sentences  HEAD:  Atraumatic, Normocephalic  EYES: EOMI, PERRLA, conjunctiva and sclera clear  NECK: Supple, No JVD, Normal thyroid  NERVOUS SYSTEM:  Alert & Oriented X3, Good concentration   CHEST/LUNG: Clear to auscultation bilaterally; No rales, rhonchi, wheezing, or rubs  HEART: Regular rate and rhythm; No murmurs, rubs, or gallops  ABDOMEN: Soft, Nontender, Nondistended; Bowel sounds present  EXTREMITIES:  2+ Peripheral Pulses, No clubbing, cyanosis, or edema    LABS/IMAGING: REVIEWED     MEDICATIONS  (STANDING):  aspirin enteric coated 81 milliGRAM(s) Oral daily  dexAMETHasone  Injectable 6 milliGRAM(s) IV Push daily  DULoxetine 30 milliGRAM(s) Oral daily  enoxaparin Injectable 40 milliGRAM(s) SubCutaneous daily  folic acid 1 milliGRAM(s) Oral daily  gabapentin 300 milliGRAM(s) Oral at bedtime  losartan 100 milliGRAM(s) Oral daily  methadone    Tablet 10 milliGRAM(s) Oral every 6 hours  montelukast 10 milliGRAM(s) Oral daily  pantoprazole    Tablet 40 milliGRAM(s) Oral before breakfast  potassium chloride    Tablet ER 40 milliEquivalent(s) Oral every 4 hours  remdesivir  IVPB   IV Intermittent   remdesivir  IVPB 200 milliGRAM(s) IV Intermittent every 24 hours  rOPINIRole 1.5 milliGRAM(s) Oral at bedtime  sodium chloride 0.9% lock flush 3 milliLiter(s) IV Push every 8 hours    MEDICATIONS  (PRN):  acetaminophen   Tablet .. 650 milliGRAM(s) Oral every 6 hours PRN Temp greater or equal to 38C (100.4F), Mild Pain (1 - 3)  ALBUTerol    90 MICROgram(s) HFA Inhaler 2 Puff(s) Inhalation every 6 hours PRN Shortness of Breath and/or Wheezing  ondansetron Injectable 4 milliGRAM(s) IV Push every 6 hours PRN Nausea

## 2020-12-15 NOTE — DISCHARGE NOTE PROVIDER - NSDCCPCAREPLAN_GEN_ALL_CORE_FT
PRINCIPAL DISCHARGE DIAGNOSIS  Diagnosis: COVID-19  Assessment and Plan of Treatment: Stable on room air. Follow covid discharge instructoins, quarantine for a total of 14 days      SECONDARY DISCHARGE DIAGNOSES  Diagnosis: HTN (hypertension)  Assessment and Plan of Treatment: Resume home meds    Diagnosis: RLS (restless legs syndrome)  Assessment and Plan of Treatment: Resume home meds     PRINCIPAL DISCHARGE DIAGNOSIS  Diagnosis: COVID-19  Assessment and Plan of Treatment: Stable on room air. Follow covid discharge instructions, quarantine for a total of 14 days      SECONDARY DISCHARGE DIAGNOSES  Diagnosis: Hypokalemia  Assessment and Plan of Treatment: Resolved    Diagnosis: HTN (hypertension)  Assessment and Plan of Treatment: Resume home meds    Diagnosis: RLS (restless legs syndrome)  Assessment and Plan of Treatment: Resume home meds     PRINCIPAL DISCHARGE DIAGNOSIS  Diagnosis: COVID-19  Assessment and Plan of Treatment: Stable on room air. Follow covid discharge instructions, quarantine for a total of 14 days. Baby aspirin daily x 30 days      SECONDARY DISCHARGE DIAGNOSES  Diagnosis: Hypokalemia  Assessment and Plan of Treatment: Repeat BMP in 2 days with PMD.    Diagnosis: HTN (hypertension)  Assessment and Plan of Treatment: Resume home meds    Diagnosis: RLS (restless legs syndrome)  Assessment and Plan of Treatment: Resume home meds     PRINCIPAL DISCHARGE DIAGNOSIS  Diagnosis: Pneumonia due to COVID-19 virus  Assessment and Plan of Treatment: Supporitve measures.        SECONDARY DISCHARGE DIAGNOSES  Diagnosis: Hypoxia  Assessment and Plan of Treatment: Resolved    Diagnosis: Hypokalemia  Assessment and Plan of Treatment: Repeat BMP in 2-3 days with PMD.    Diagnosis: Chronic GERD  Assessment and Plan of Treatment: Continue home medications    Diagnosis: Dyslipidemia  Assessment and Plan of Treatment: Continue home medications    Diagnosis: HTN (hypertension)  Assessment and Plan of Treatment: Resume home meds    Diagnosis: RLS (restless legs syndrome)  Assessment and Plan of Treatment: Resume home meds

## 2020-12-15 NOTE — DISCHARGE NOTE PROVIDER - HOSPITAL COURSE
70 year old female with PMH HTN, Dyslipidemia, GERD, RLS on chronic Methadone, recent TKA (6 weeks) and multiple spine surgeries presented with dyspnea and mild dizziness x 3d. COVID (+) in urgent care center, SpO2 89% on room air on ambulation. Admitted to [facility] for further mgt. Pt seen by ID, started on steroids and remdesivir. Pt remained stable on room air, does not require O2 on discharge, 96% on ambulation on room air. Hospital course complicated by hypokalemia, s/p repletion and K now normal. Pt Now medically stable for DC home.      70 year old female with PMH HTN, Dyslipidemia, GERD, RLS on chronic Methadone, recent TKA (6 weeks) and multiple spine surgeries presented with dyspnea and mild dizziness x 3d. COVID (+) in urgent care center, SpO2 89% on room air on ambulation. Admitted to [facility] for further mgt. Pt seen by ID, started on steroids and remdesivir. Pt remained stable on room air, does not require O2 on discharge, 96% on ambulation on room air. Hospital course complicated by hypokalemia, s/p repletion and K now normal. Pt Now medically stable for DC home.     Vital Signs Last 24 Hrs  T(C): 37.3 (16 Dec 2020 04:55), Max: 37.3 (16 Dec 2020 04:55)  T(F): 99.2 (16 Dec 2020 04:55), Max: 99.2 (16 Dec 2020 04:55)  HR: 87 (16 Dec 2020 04:55) (63 - 87)  BP: 164/91 (16 Dec 2020 04:55) (143/87 - 167/81)  BP(mean): --  RR: 18 (16 Dec 2020 04:55) (18 - 20)  SpO2: 97% (16 Dec 2020 04:55) (94% - 97%) on RA    PHYSICAL EXAM:  GENERAL: NAD, speaking full sentences  HEAD:  Atraumatic, Normocephalic  EYES: EOMI, PERRLA, conjunctiva and sclera clear  NECK: Supple, No JVD, Normal thyroid  NERVOUS SYSTEM:  Alert & Oriented X3, Good concentration   CHEST/LUNG: Clear to auscultation bilaterally; No rales, rhonchi, wheezing, or rubs  HEART: Regular rate and rhythm; No murmurs, rubs, or gallops  ABDOMEN: Soft, Nontender, Nondistended; Bowel sounds present  EXTREMITIES:  2+ Peripheral Pulses, No clubbing, cyanosis, or edema     70 year old female with PMH HTN, Dyslipidemia, GERD, RLS on chronic Methadone, recent TKA (6 weeks) and multiple spine surgeries presented with dyspnea and mild dizziness x 3d. COVID (+) in urgent care center, SpO2 89% on room air on ambulation. Admitted to [facility] for further mgt. Pt seen by ID, started on steroids and remdesivir. Pt remained stable on room air, does not require O2 on discharge, 96% on ambulation on room air. Hospital course complicated by hypokalemia, s/p repletion and K now normal. Pt Now medically stable for DC home.     Vital Signs Last 24 Hrs  T(C): 37.3 (16 Dec 2020 04:55), Max: 37.3 (16 Dec 2020 04:55)  T(F): 99.2 (16 Dec 2020 04:55), Max: 99.2 (16 Dec 2020 04:55)  HR: 87 (16 Dec 2020 04:55) (63 - 87)  BP: 164/91 (16 Dec 2020 04:55) (143/87 - 167/81)  BP(mean): --  RR: 18 (16 Dec 2020 04:55) (18 - 20)  SpO2: 97% (16 Dec 2020 04:55) (94% - 97%) on RA    PHYSICAL EXAM:  GENERAL: NAD, speaking full sentences  HEAD:  Atraumatic, Normocephalic  EYES: EOMI, PERRLA, conjunctiva and sclera clear  NECK: Supple, No JVD, Normal thyroid  NERVOUS SYSTEM:  Alert & Oriented X3, Good concentration   CHEST/LUNG: Clear to auscultation bilaterally; No rales, rhonchi, wheezing, or rubs  HEART: Regular rate and rhythm; No murmurs, rubs, or gallops  ABDOMEN: Soft, Nontender, Nondistended; Bowel sounds present  EXTREMITIES:  2+ Peripheral Pulses, No clubbing, cyanosis, or edema         70 year old female with PMH HTN, Dyslipidemia, GERD, RLS on chronic Methadone, recent TKA (6 weeks) and multiple spine surgeries presented with dyspnea and mild dizziness x 3d. COVID (+) in urgent care center, SpO2 89% on room air on ambulation. Admitted to [facility] for further management with supplemental O2, Decadron. Evaluated by ID, started on Remdesivir. She remained stable on room air, does not require O2 on discharge, 96% on ambulation on room air. Hospital course complicated by hypokalemia, s/p repletion and K now normal.  Now medically stable for DC home with home care.    Vital Signs Last 24 Hrs  T(C): 37.3 (16 Dec 2020 04:55), Max: 37.3 (16 Dec 2020 04:55)  T(F): 99.2 (16 Dec 2020 04:55), Max: 99.2 (16 Dec 2020 04:55)  HR: 87 (16 Dec 2020 04:55) (63 - 87)  BP: 164/91 (16 Dec 2020 04:55) (143/87 - 167/81)   RR: 18 (16 Dec 2020 04:55) (18 - 20)  SpO2: 97% (16 Dec 2020 04:55) (94% - 97%) on RA    PHYSICAL EXAM:  GENERAL: NAD, speaking full sentences  HEAD:  Atraumatic, Normocephalic  EYES: EOMI, PERRLA, conjunctiva and sclera clear  NECK: Supple, No JVD, Normal thyroid  NERVOUS SYSTEM:  Alert & Oriented X3, Good concentration   CHEST/LUNG: Clear to auscultation bilaterally; No rales, rhonchi, wheezing, or rubs  HEART: Regular rate and rhythm; No murmurs, rubs, or gallops  ABDOMEN: Soft, Nontender, Nondistended; Bowel sounds present  EXTREMITIES:  2+ Peripheral Pulses, No clubbing, cyanosis, or edema

## 2020-12-15 NOTE — PROVIDER CONTACT NOTE (CRITICAL VALUE NOTIFICATION) - NAME OF MD/NP/PA/DO NOTIFIED:
Deferred visit: Nursing reports patient is not appropriate for therapy today. Will follow up tomorrow. LIZZY Valdovinos

## 2020-12-15 NOTE — PROGRESS NOTE ADULT - ATTENDING COMMENTS
70 year old female with PMH HTN, Dyslipidemia, GERD, RLS on chronic Methadone,  recent TKA (6 weeks) and multiple spine surgeries presented with dyspnea and mild dizziness x 3d. COVID (+) in urgent care center, SpO2 89% on room air on ambulation.  Admitted for Acute Hypoxic respiratory failure, COVID-19 Pneumonia and started on standard therapies  Patient seen and examined at bedside earlier today-  Feels much better; Normoxic.  Hypokalemia replete    If remains normoxic overnight would discharge home

## 2020-12-15 NOTE — DISCHARGE NOTE PROVIDER - CARE PROVIDER_API CALL
Fran Carr)  Internal Medicine  [address], [facility] [facility] [address]  [place] [address]  Phone: [phone]  Fax: [phone]  Follow Up Time:

## 2020-12-16 ENCOUNTER — TRANSCRIPTION ENCOUNTER (OUTPATIENT)
Age: 70
End: 2020-12-16

## 2020-12-16 VITALS
TEMPERATURE: 99 F | DIASTOLIC BLOOD PRESSURE: 85 MMHG | HEART RATE: 80 BPM | SYSTOLIC BLOOD PRESSURE: 149 MMHG | RESPIRATION RATE: 18 BRPM | OXYGEN SATURATION: 92 %

## 2020-12-16 LAB
ALBUMIN SERPL ELPH-MCNC: 4 G/DL — SIGNIFICANT CHANGE UP (ref 3.3–5.2)
ALP SERPL-CCNC: 126 U/L — HIGH (ref 40–120)
ALT FLD-CCNC: 30 U/L — SIGNIFICANT CHANGE UP
ANION GAP SERPL CALC-SCNC: 12 MMOL/L — SIGNIFICANT CHANGE UP (ref 5–17)
AST SERPL-CCNC: 36 U/L — HIGH
BASOPHILS # BLD AUTO: 0.01 K/UL — SIGNIFICANT CHANGE UP (ref 0–0.2)
BASOPHILS NFR BLD AUTO: 0.2 % — SIGNIFICANT CHANGE UP (ref 0–2)
BILIRUB DIRECT SERPL-MCNC: 0.1 MG/DL — SIGNIFICANT CHANGE UP (ref 0–0.3)
BILIRUB INDIRECT FLD-MCNC: 0.2 MG/DL — SIGNIFICANT CHANGE UP (ref 0.2–1)
BILIRUB SERPL-MCNC: 0.3 MG/DL — LOW (ref 0.4–2)
BUN SERPL-MCNC: 24 MG/DL — HIGH (ref 8–20)
CALCIUM SERPL-MCNC: 9.1 MG/DL — SIGNIFICANT CHANGE UP (ref 8.6–10.2)
CHLORIDE SERPL-SCNC: 94 MMOL/L — LOW (ref 98–107)
CO2 SERPL-SCNC: 31 MMOL/L — HIGH (ref 22–29)
CREAT SERPL-MCNC: 0.78 MG/DL — SIGNIFICANT CHANGE UP (ref 0.5–1.3)
CRP SERPL-MCNC: 2.27 MG/DL — HIGH (ref 0–0.4)
D DIMER BLD IA.RAPID-MCNC: 422 NG/ML DDU — HIGH
EOSINOPHIL # BLD AUTO: 0.13 K/UL — SIGNIFICANT CHANGE UP (ref 0–0.5)
EOSINOPHIL NFR BLD AUTO: 2 % — SIGNIFICANT CHANGE UP (ref 0–6)
FERRITIN SERPL-MCNC: 278 NG/ML — HIGH (ref 15–150)
GLUCOSE SERPL-MCNC: 95 MG/DL — SIGNIFICANT CHANGE UP (ref 70–99)
HCT VFR BLD CALC: 37.5 % — SIGNIFICANT CHANGE UP (ref 34.5–45)
HGB BLD-MCNC: 12.1 G/DL — SIGNIFICANT CHANGE UP (ref 11.5–15.5)
IMM GRANULOCYTES NFR BLD AUTO: 0.3 % — SIGNIFICANT CHANGE UP (ref 0–1.5)
LYMPHOCYTES # BLD AUTO: 0.84 K/UL — LOW (ref 1–3.3)
LYMPHOCYTES # BLD AUTO: 13 % — SIGNIFICANT CHANGE UP (ref 13–44)
MCHC RBC-ENTMCNC: 27.9 PG — SIGNIFICANT CHANGE UP (ref 27–34)
MCHC RBC-ENTMCNC: 32.3 GM/DL — SIGNIFICANT CHANGE UP (ref 32–36)
MCV RBC AUTO: 86.6 FL — SIGNIFICANT CHANGE UP (ref 80–100)
MONOCYTES # BLD AUTO: 0.45 K/UL — SIGNIFICANT CHANGE UP (ref 0–0.9)
MONOCYTES NFR BLD AUTO: 6.9 % — SIGNIFICANT CHANGE UP (ref 2–14)
NEUTROPHILS # BLD AUTO: 5.03 K/UL — SIGNIFICANT CHANGE UP (ref 1.8–7.4)
NEUTROPHILS NFR BLD AUTO: 77.6 % — HIGH (ref 43–77)
PLATELET # BLD AUTO: 225 K/UL — SIGNIFICANT CHANGE UP (ref 150–400)
POTASSIUM SERPL-MCNC: 3.3 MMOL/L — LOW (ref 3.5–5.3)
POTASSIUM SERPL-SCNC: 3.3 MMOL/L — LOW (ref 3.5–5.3)
PROT SERPL-MCNC: 7.3 G/DL — SIGNIFICANT CHANGE UP (ref 6.6–8.7)
RBC # BLD: 4.33 M/UL — SIGNIFICANT CHANGE UP (ref 3.8–5.2)
RBC # FLD: 13 % — SIGNIFICANT CHANGE UP (ref 10.3–14.5)
SARS-COV-2 IGG SERPL QL IA: NEGATIVE — SIGNIFICANT CHANGE UP
SARS-COV-2 IGM SERPL IA-ACNC: <0.1 INDEX — SIGNIFICANT CHANGE UP
SODIUM SERPL-SCNC: 137 MMOL/L — SIGNIFICANT CHANGE UP (ref 135–145)
WBC # BLD: 6.48 K/UL — SIGNIFICANT CHANGE UP (ref 3.8–10.5)
WBC # FLD AUTO: 6.48 K/UL — SIGNIFICANT CHANGE UP (ref 3.8–10.5)

## 2020-12-16 PROCEDURE — 99238 HOSP IP/OBS DSCHRG MGMT 30/<: CPT | Mod: CS

## 2020-12-16 PROCEDURE — 99232 SBSQ HOSP IP/OBS MODERATE 35: CPT | Mod: CS

## 2020-12-16 RX ORDER — POTASSIUM CHLORIDE 20 MEQ
40 PACKET (EA) ORAL ONCE
Refills: 0 | Status: COMPLETED | OUTPATIENT
Start: 2020-12-16 | End: 2020-12-16

## 2020-12-16 RX ORDER — LOSARTAN POTASSIUM 100 MG/1
1 TABLET, FILM COATED ORAL
Qty: 0 | Refills: 0 | DISCHARGE
Start: 2020-12-16

## 2020-12-16 RX ORDER — ASPIRIN/CALCIUM CARB/MAGNESIUM 324 MG
1 TABLET ORAL
Qty: 30 | Refills: 0
Start: 2020-12-16 | End: 2021-01-14

## 2020-12-16 RX ADMIN — Medication 40 MILLIEQUIVALENT(S): at 11:28

## 2020-12-16 RX ADMIN — Medication 6 MILLIGRAM(S): at 05:35

## 2020-12-16 RX ADMIN — DULOXETINE HYDROCHLORIDE 30 MILLIGRAM(S): 30 CAPSULE, DELAYED RELEASE ORAL at 12:30

## 2020-12-16 RX ADMIN — ENOXAPARIN SODIUM 40 MILLIGRAM(S): 100 INJECTION SUBCUTANEOUS at 12:29

## 2020-12-16 RX ADMIN — LOSARTAN POTASSIUM 100 MILLIGRAM(S): 100 TABLET, FILM COATED ORAL at 05:35

## 2020-12-16 RX ADMIN — Medication 81 MILLIGRAM(S): at 12:30

## 2020-12-16 RX ADMIN — Medication 1 MILLIGRAM(S): at 12:30

## 2020-12-16 RX ADMIN — METHADONE HYDROCHLORIDE 10 MILLIGRAM(S): 40 TABLET ORAL at 12:30

## 2020-12-16 RX ADMIN — ONDANSETRON 4 MILLIGRAM(S): 8 TABLET, FILM COATED ORAL at 05:35

## 2020-12-16 RX ADMIN — METHADONE HYDROCHLORIDE 10 MILLIGRAM(S): 40 TABLET ORAL at 05:35

## 2020-12-16 RX ADMIN — SODIUM CHLORIDE 3 MILLILITER(S): 9 INJECTION INTRAMUSCULAR; INTRAVENOUS; SUBCUTANEOUS at 05:35

## 2020-12-16 RX ADMIN — MONTELUKAST 10 MILLIGRAM(S): 4 TABLET, CHEWABLE ORAL at 12:30

## 2020-12-16 RX ADMIN — PANTOPRAZOLE SODIUM 40 MILLIGRAM(S): 20 TABLET, DELAYED RELEASE ORAL at 05:35

## 2020-12-16 NOTE — PROGRESS NOTE ADULT - ASSESSMENT
71 y/o female with Hx of HTN, RLS, GERD admitted wtih COVID PNA and hypoxia.     1. COVID-19  - On room air, not requiring supplemental O2  - C/w decadron, s/p remdesivir   - ID consult appreciated   - Trend inflammatory markers  - PRN tylenol   - OOB, spirometer  - DVT PPE    2. Hypokalemia  - K 3.2  - Denies diarrhea, vomiting. No diuretics. Mg normal  - Repleted again, will need to repeat BMP on discharge with PMd    3. Hypertension  -  DASH diet, continue outpatient regimen    4. GERD  - PPI    5.  Restless leg syndrome  - Continue outpatient regimen with Neurontin, Requip, Methadone  - Walks w/ walker at home per pt    DISPO: DC home today  69 y/o female with Hx of HTN, RLS, GERD admitted wt COVID PNA and hypoxia.     1. COVID-19  - On room air, not requiring supplemental O2  - C/w decadron, s/p remdesivir   - ID consult appreciated   - Trend inflammatory markers  - PRN tylenol   - OOB, spirometer  - DVT PPE  - Will discharge with baby aspirin daily x 30 days, IMPROVE 1    2. Hypokalemia  - K 3.2  - Denies diarrhea, vomiting. No diuretics. Mg normal  - Repleted again, will need to repeat BMP on discharge with PMd    3. Hypertension  -  DASH diet, continue outpatient regimen    4. GERD  - PPI    5.  Restless leg syndrome  - Continue outpatient regimen with Neurontin, Requip, Methadone  - Walks w/ walker at home per pt    DISPO: DC home today  69 y/o female with Hx of HTN, RLS, GERD admitted wtih COVID PNA and hypoxia.     1. COVID-19  - On room air, not requiring supplemental O2  - C/w decadron, s/p remdesivir   - ID consult appreciated   - Trend inflammatory markers  - PRN tylenol   - OOB, spirometer  - DVT PPE  - Will discharge with baby aspirin once a day x 30 days, IMPROVE 1    2. Hypokalemia  - K 3.2  - Denies diarrhea, vomiting. No diuretics. Mg normal  - Repleted again, will need to repeat BMP on discharge with PMD     3. Hypertension  -  DASH diet, continue outpatient regimen    4. GERD  - PPI    5.  Restless leg syndrome  - Continue outpatient regimen with Neurontin, Requip, Methadone  - Walks w/ walker at home per pt    DISPO: DC home today

## 2020-12-16 NOTE — PROGRESS NOTE ADULT - SUBJECTIVE AND OBJECTIVE BOX
[facility]  INFECTIOUS DISEASES AND INTERNAL MEDICINE at [place]  =======================================================  Ricardo Vasques MD  Diplomates American Board of Internal Medicine and Infectious Diseases  Tel  [phone]  Fax [phone]  =======================================================    [id number]  VIRGINIA HUFFMAN   follow up:  COVID-19    still doing well  no SOB  not on oxygen      =======================================================    REVIEW OF SYSTEMS:  CONSTITUTIONAL:  No Fever or chills  HEENT:  No diplopia or blurred vision.  No earache, sore throat or runny nose.  CARDIOVASCULAR:  No pressure, squeezing, strangling, tightness, heaviness or aching about the chest, neck, axilla or epigastrium.  RESPIRATORY:  MILD shortness of breath  GASTROINTESTINAL:  No nausea, vomiting or diarrhea.  GENITOURINARY:  No dysuria, frequency or urgency. No Blood in urine  MUSCULOSKELETAL:  no joint aches, no muscle pain  SKIN:  No change in skin, hair or nails.  NEUROLOGIC:  No Headaches, seizures or weakness.  PSYCHIATRIC:  No disorder of thought or mood.  ENDOCRINE:  No heat or cold intolerance  HEMATOLOGICAL:  No easy bruising or bleeding.    =======================================================  Allergies  Dilantin (Urticaria)  erythromycin (Nausea)  penicillins (Urticaria)       ======================================================  Physical Exam:  ============     General:  No acute distress.  THIN  Eye: Pupils are equal, round and reactive to light, Extraocular movements are intact, Normal conjunctiva.  HENT: Normocephalic, Oral mucosa is moist, No pharyngeal erythema, No sinus tenderness.  Neck: Supple, No lymphadenopathy.  Respiratory: Lungs with poor air entry, coarse rhonchi at bases  Cardiovascular: Normal rate, Regular rhythm,   Gastrointestinal: Soft, Non-tender, Non-distended, Normal bowel sounds.  Genitourinary: No costovertebral angle tenderness.  Lymphatics: No lymphadenopathy neck,   Musculoskeletal: Normal range of motion, Normal strength.  Integumentary: No rash.  Neurologic: Alert, Oriented, No focal deficits, Cranial Nerves II-XII are grossly intact.  Psychiatric: Appropriate mood & affect.    =======================================================    Vitals:  ============  T(F): 99 (16 Dec 2020 09:21), Max: 99.2 (16 Dec 2020 04:55)  HR: 80 (16 Dec 2020 09:21)  BP: 149/85 (16 Dec 2020 09:21)  RR: 18 (16 Dec 2020 09:21)  SpO2: 92% (16 Dec 2020 09:21) (92% - 97%)  temp max in last 48H T(F): , Max: 99.2 (12-16-20 @ 04:55)    =======================================================  Current Antibiotics:  remdesivir  IVPB 100 milliGRAM(s) IV Intermittent every 24 hours    Other medications:  aspirin enteric coated 81 milliGRAM(s) Oral daily  dexAMETHasone  Injectable 6 milliGRAM(s) IV Push daily  DULoxetine 30 milliGRAM(s) Oral daily  enoxaparin Injectable 40 milliGRAM(s) SubCutaneous daily  folic acid 1 milliGRAM(s) Oral daily  gabapentin 300 milliGRAM(s) Oral at bedtime  losartan 100 milliGRAM(s) Oral daily  methadone    Tablet 10 milliGRAM(s) Oral every 6 hours  montelukast 10 milliGRAM(s) Oral daily  pantoprazole    Tablet 40 milliGRAM(s) Oral before breakfast  rOPINIRole 1.5 milliGRAM(s) Oral at bedtime  sodium chloride 0.9% lock flush 3 milliLiter(s) IV Push every 8 hours    =======================================================  Labs:                        12.1   6.48  )-----------( 225      ( 16 Dec 2020 07:10 )             37.5      12-16    137  |  94<L>  |  24.0<H>  ----------------------------<  95  3.3<L>   |  31.0<H>  |  0.78    Ca    9.1      16 Dec 2020 07:10  Phos  3.5     12-15  Mg     1.9     12-15    TPro  7.3  /  Alb  4.0  /  TBili  0.3<L>  /  DBili  0.1  /  AST  36<H>  /  ALT  30  /  AlkPhos  126<H>  12-16    Creatinine, Serum: 0.78 mg/dL (12-16-20 @ 07:10)  Creatinine, Serum: 0.81 mg/dL (12-15-20 @ 14:36)  Creatinine, Serum: 0.83 mg/dL (12-15-20 @ 11:50)  Creatinine, Serum: 0.91 mg/dL (12-15-20 @ 07:40)  Creatinine, Serum: 0.97 mg/dL (12-14-20 @ 20:14)    Procalcitonin, Serum: 0.09 ng/mL (12-14-20 @ 20:14)    D-Dimer Assay, Quantitative: 422 ng/mL DDU (12-16-20 @ 07:10)    Ferritin, Serum: 278 ng/mL (12-16-20 @ 07:10)  Ferritin, Serum: 252 ng/mL (12-14-20 @ 20:14)    C-Reactive Protein, Serum: 2.27 mg/dL (12-16-20 @ 07:10)  C-Reactive Protein, Serum: 2.95 mg/dL (12-14-20 @ 20:14)    WBC Count: 6.48 K/uL (12-16-20 @ 07:10)  WBC Count: 2.60 K/uL (12-15-20 @ 07:40)  WBC Count: 4.46 K/uL (12-14-20 @ 20:14)      COVID-19 IgG Antibody Index: <0.10 Index (12-15-20 @ 14:13)  COVID-19 IgG Antibody Interpretation: Negative (12-15-20 @ 14:13)  COVID-19 PCR: Detected (12-14-20 @ 20:14)    Lactate Dehydrogenase, Serum: 423 U/L (12-14-20 @ 20:14)    Alkaline Phosphatase, Serum: 126 U/L (12-16-20 @ 07:09)  Alkaline Phosphatase, Serum: 134 U/L (12-15-20 @ 11:50)  Alkaline Phosphatase, Serum: 129 U/L (12-14-20 @ 20:14)  Alanine Aminotransferase (ALT/SGPT): 30 U/L (12-16-20 @ 07:09)  Alanine Aminotransferase (ALT/SGPT): 27 U/L (12-15-20 @ 11:50)  Alanine Aminotransferase (ALT/SGPT): 26 U/L (12-14-20 @ 20:14)  Aspartate Aminotransferase (AST/SGOT): 36 U/L (12-16-20 @ 07:09)  Aspartate Aminotransferase (AST/SGOT): 31 U/L (12-15-20 @ 11:50)  Aspartate Aminotransferase (AST/SGOT): 41 U/L (12-14-20 @ 20:14)  Bilirubin Total, Serum: 0.3 mg/dL (12-16-20 @ 07:09)  Bilirubin Total, Serum: 0.4 mg/dL (12-15-20 @ 11:50)  Bilirubin Total, Serum: 0.4 mg/dL (12-14-20 @ 20:14)  Bilirubin Direct, Serum: 0.1 mg/dL (12-16-20 @ 07:09)  Bilirubin Direct, Serum: 0.1 mg/dL (12-15-20 @ 11:50)        < from: Xray Chest 1 View- PORTABLE-Urgent (12.14.20 @ 19:55) >     EXAM:  XR CHEST PORTABLE URGENT 1V                          PROCEDURE DATE:  12/14/2020          INTERPRETATION:  TECHNIQUE: Single portable view of the chest.    COMPARISON: 2/17/2013    CLINICAL HISTORY: Shortness of Breath, Cough,  Fever    FINDINGS:    Single frontal view of the chest demonstrates the lungs to be clear. The cardiomediastinal silhouette is normal. No acute osseous abnormalities.    IMPRESSION: No acute cardiopulmonary disease process.           CALEB CRAIN MD; Attending Radiologist  This document has been electronically signed. Dec 15 2020  9:26AM    < end of copied text >

## 2020-12-16 NOTE — PROGRESS NOTE ADULT - SUBJECTIVE AND OBJECTIVE BOX
Patient is a 70y old  Female who presents with a chief complaint of COVID Pos.  Hypoxia (14 Dec 2020 23:47)       Pt seen and examined at bedside  States feels fine, no specific complaints, ready to go home  Currently on room air, maintaining sats. Does not require home o2, 96% on RA on ambulation  ROS neg   VSS on RA    Vital Signs Last 24 Hrs  T(C): 37.2 (16 Dec 2020 09:21), Max: 37.3 (16 Dec 2020 04:55)  T(F): 99 (16 Dec 2020 09:21), Max: 99.2 (16 Dec 2020 04:55)  HR: 80 (16 Dec 2020 09:21) (64 - 87)  BP: 149/85 (16 Dec 2020 09:21) (147/80 - 167/81)  BP(mean): --  RR: 18 (16 Dec 2020 09:21) (18 - 18)  SpO2: 92% (16 Dec 2020 09:21) (92% - 97%) on RA    MEDICATIONS  (STANDING):  aspirin enteric coated 81 milliGRAM(s) Oral daily  dexAMETHasone  Injectable 6 milliGRAM(s) IV Push daily  DULoxetine 30 milliGRAM(s) Oral daily  enoxaparin Injectable 40 milliGRAM(s) SubCutaneous daily  folic acid 1 milliGRAM(s) Oral daily  gabapentin 300 milliGRAM(s) Oral at bedtime  losartan 100 milliGRAM(s) Oral daily  methadone    Tablet 10 milliGRAM(s) Oral every 6 hours  montelukast 10 milliGRAM(s) Oral daily  pantoprazole    Tablet 40 milliGRAM(s) Oral before breakfast  potassium chloride    Tablet ER 40 milliEquivalent(s) Oral once  remdesivir  IVPB   IV Intermittent   remdesivir  IVPB 100 milliGRAM(s) IV Intermittent every 24 hours  rOPINIRole 1.5 milliGRAM(s) Oral at bedtime  sodium chloride 0.9% lock flush 3 milliLiter(s) IV Push every 8 hours    MEDICATIONS  (PRN):  acetaminophen   Tablet .. 650 milliGRAM(s) Oral every 6 hours PRN Temp greater or equal to 38C (100.4F), Mild Pain (1 - 3)  ALBUTerol    90 MICROgram(s) HFA Inhaler 2 Puff(s) Inhalation every 6 hours PRN Shortness of Breath and/or Wheezing  benzocaine 15 mG/menthol 3.6 mG (Sugar-Free) Lozenge 1 Lozenge Oral every 4 hours PRN Sore Throat  ondansetron Injectable 4 milliGRAM(s) IV Push every 6 hours PRN Nausea      LABS                          12.1   6.48  )-----------( 225      ( 16 Dec 2020 07:10 )             37.5     12-16    137  |  94<L>  |  24.0<H>  ----------------------------<  95  3.3<L>   |  31.0<H>  |  0.78    Ca    9.1      16 Dec 2020 07:10  Phos  3.5     12-15  Mg     1.9     12-15    TPro  7.3  /  Alb  4.0  /  TBili  0.3<L>  /  DBili  0.1  /  AST  36<H>  /  ALT  30  /  AlkPhos  126<H>  12-16    C-Reactive Protein, Serum (12.16.20 @ 07:10)    C-Reactive Protein, Serum: 2.27 mg/dL    D-Dimer Assay, Quantitative (12.16.20 @ 07:10)    D-Dimer Assay, Quantitative: 422 ng/mL DDU    Ferritin, Serum in AM (12.16.20 @ 07:10)    Ferritin, Serum: 278 ng/mL                   Patient is a 70y old  Female who presents with a chief complaint of COVID Pos.  Hypoxia (14 Dec 2020 23:47)       Pt seen and examined at bedside  States feels fine, no specific complaints, ready to go home  Currently on room air, maintaining sats   ROS neg   VSS on RA    Vital Signs Last 24 Hrs  T(C): 37.2 (16 Dec 2020 09:21), Max: 37.3 (16 Dec 2020 04:55)  T(F): 99 (16 Dec 2020 09:21), Max: 99.2 (16 Dec 2020 04:55)  HR: 80 (16 Dec 2020 09:21) (64 - 87)  BP: 149/85 (16 Dec 2020 09:21) (147/80 - 167/81)  BP(mean): --  RR: 18 (16 Dec 2020 09:21) (18 - 18)  SpO2: 92% (16 Dec 2020 09:21) (92% - 97%) on RA    MEDICATIONS  (STANDING):  aspirin enteric coated 81 milliGRAM(s) Oral daily  dexAMETHasone  Injectable 6 milliGRAM(s) IV Push daily  DULoxetine 30 milliGRAM(s) Oral daily  enoxaparin Injectable 40 milliGRAM(s) SubCutaneous daily  folic acid 1 milliGRAM(s) Oral daily  gabapentin 300 milliGRAM(s) Oral at bedtime  losartan 100 milliGRAM(s) Oral daily  methadone    Tablet 10 milliGRAM(s) Oral every 6 hours  montelukast 10 milliGRAM(s) Oral daily  pantoprazole    Tablet 40 milliGRAM(s) Oral before breakfast  potassium chloride    Tablet ER 40 milliEquivalent(s) Oral once  remdesivir  IVPB   IV Intermittent   remdesivir  IVPB 100 milliGRAM(s) IV Intermittent every 24 hours  rOPINIRole 1.5 milliGRAM(s) Oral at bedtime  sodium chloride 0.9% lock flush 3 milliLiter(s) IV Push every 8 hours    MEDICATIONS  (PRN):  acetaminophen   Tablet .. 650 milliGRAM(s) Oral every 6 hours PRN Temp greater or equal to 38C (100.4F), Mild Pain (1 - 3)  ALBUTerol    90 MICROgram(s) HFA Inhaler 2 Puff(s) Inhalation every 6 hours PRN Shortness of Breath and/or Wheezing  benzocaine 15 mG/menthol 3.6 mG (Sugar-Free) Lozenge 1 Lozenge Oral every 4 hours PRN Sore Throat  ondansetron Injectable 4 milliGRAM(s) IV Push every 6 hours PRN Nausea      LABS                          12.1   6.48  )-----------( 225      ( 16 Dec 2020 07:10 )             37.5     12-16    137  |  94<L>  |  24.0<H>  ----------------------------<  95  3.3<L>   |  31.0<H>  |  0.78    Ca    9.1      16 Dec 2020 07:10  Phos  3.5     12-15  Mg     1.9     12-15    TPro  7.3  /  Alb  4.0  /  TBili  0.3<L>  /  DBili  0.1  /  AST  36<H>  /  ALT  30  /  AlkPhos  126<H>  12-16    C-Reactive Protein, Serum (12.16.20 @ 07:10)    C-Reactive Protein, Serum: 2.27 mg/dL    D-Dimer Assay, Quantitative (12.16.20 @ 07:10)    D-Dimer Assay, Quantitative: 422 ng/mL DDU    Ferritin, Serum in AM (12.16.20 @ 07:10)    Ferritin, Serum: 278 ng/mL

## 2020-12-16 NOTE — DISCHARGE NOTE NURSING/CASE MANAGEMENT/SOCIAL WORK - PATIENT PORTAL LINK FT
You can access the FollowMyHealth Patient Portal offered by [facility] by registering at the following website: [email or web address]. By joining [employer]’s FollowMyHealth portal, you will also be able to view your health information using other applications (apps) compatible with our system.

## 2020-12-17 ENCOUNTER — NON-APPOINTMENT (OUTPATIENT)
Age: 70
End: 2020-12-17

## 2020-12-21 ENCOUNTER — APPOINTMENT (OUTPATIENT)
Dept: ENDOCRINOLOGY | Facility: CLINIC | Age: 70
End: 2020-12-21

## 2020-12-22 ENCOUNTER — APPOINTMENT (OUTPATIENT)
Dept: INTERNAL MEDICINE | Facility: CLINIC | Age: 70
End: 2020-12-22
Payer: MEDICARE

## 2020-12-22 PROCEDURE — 99213 OFFICE O/P EST LOW 20 MIN: CPT | Mod: CS,95

## 2020-12-22 NOTE — HISTORY OF PRESENT ILLNESS
[Home] : at home, [unfilled] , at the time of the visit. [Medical Office: ([facility])___] : at the medical office located in  [Verbal consent obtained from patient] : the patient, [unfilled] [de-identified] : Calls for follow up. \par Diagnosed with COVID 7 days ago; spent a few days in [facility]; received redemsivir.\par DOing OK; very fatigued.  No SOB though saturation ranged 86-96%.  Able to speak in full sentences\par No fever; non-productive cough. \par Eating OK, though lost a bit of weight,. \par Also with knee pain at site of recent TKR.

## 2020-12-23 LAB
ANION GAP SERPL CALC-SCNC: 17 MMOL/L
BUN SERPL-MCNC: 24 MG/DL
CALCIUM SERPL-MCNC: 9.6 MG/DL
CHLORIDE SERPL-SCNC: 91 MMOL/L
CO2 SERPL-SCNC: 28 MMOL/L
CREAT SERPL-MCNC: 1.19 MG/DL
GLUCOSE SERPL-MCNC: 122 MG/DL
POTASSIUM SERPL-SCNC: 4.4 MMOL/L
SODIUM SERPL-SCNC: 135 MMOL/L

## 2021-01-07 ENCOUNTER — APPOINTMENT (OUTPATIENT)
Dept: ENDOCRINOLOGY | Facility: CLINIC | Age: 71
End: 2021-01-07
Payer: MEDICARE

## 2021-01-07 PROCEDURE — 77085 DXA BONE DENSITY AXL VRT FX: CPT

## 2021-01-07 PROCEDURE — 96372 THER/PROPH/DIAG INJ SC/IM: CPT

## 2021-01-07 RX ORDER — DENOSUMAB 60 MG/ML
60 INJECTION SUBCUTANEOUS
Qty: 1 | Refills: 0 | Status: COMPLETED | OUTPATIENT
Start: 2021-01-07

## 2021-01-07 RX ADMIN — DENOSUMAB 0 MG/ML: 60 INJECTION SUBCUTANEOUS at 00:00

## 2021-01-19 ENCOUNTER — APPOINTMENT (OUTPATIENT)
Dept: ENDOCRINOLOGY | Facility: CLINIC | Age: 71
End: 2021-01-19

## 2021-02-22 ENCOUNTER — NON-APPOINTMENT (OUTPATIENT)
Age: 71
End: 2021-02-22

## 2021-02-24 ENCOUNTER — APPOINTMENT (OUTPATIENT)
Dept: INTERNAL MEDICINE | Facility: CLINIC | Age: 71
End: 2021-02-24
Payer: MEDICARE

## 2021-02-24 PROCEDURE — 99214 OFFICE O/P EST MOD 30 MIN: CPT

## 2021-02-25 LAB
BASOPHILS # BLD AUTO: 0.05 K/UL
BASOPHILS NFR BLD AUTO: 1 %
CRP SERPL-MCNC: 0.19 MG/DL
EOSINOPHIL # BLD AUTO: 0.14 K/UL
EOSINOPHIL NFR BLD AUTO: 2.8 %
ERYTHROCYTE [SEDIMENTATION RATE] IN BLOOD BY WESTERGREN METHOD: 37 MM/HR
HCT VFR BLD CALC: 38.9 %
HGB BLD-MCNC: 12.2 G/DL
IMM GRANULOCYTES NFR BLD AUTO: 0.2 %
LYMPHOCYTES # BLD AUTO: 1.74 K/UL
LYMPHOCYTES NFR BLD AUTO: 34.7 %
MAN DIFF?: NORMAL
MCHC RBC-ENTMCNC: 27.3 PG
MCHC RBC-ENTMCNC: 31.4 GM/DL
MCV RBC AUTO: 87 FL
MONOCYTES # BLD AUTO: 0.29 K/UL
MONOCYTES NFR BLD AUTO: 5.8 %
NEUTROPHILS # BLD AUTO: 2.78 K/UL
NEUTROPHILS NFR BLD AUTO: 55.5 %
PLATELET # BLD AUTO: 253 K/UL
RBC # BLD: 4.47 M/UL
RBC # FLD: 14.6 %
WBC # FLD AUTO: 5.01 K/UL

## 2021-02-25 NOTE — HISTORY OF PRESENT ILLNESS
[FreeTextEntry8] : Comes in with right knee pain.\par Had TKR in November.  Since then the pain has progressively worsened. \par Saw the orthopedist who did it and was told it was fine. \par No fever.  Having trouble walking.

## 2021-02-25 NOTE — ASSESSMENT
[FreeTextEntry1] : Concern about prosthetic infection .\par Check bloods\par Consider CT of knee. \par \par 34 minutes spent in preparation of this visit, including review of previous notes and test results, interview and examination of patient, discussion of plan, arranging for appropriate testing and treatment, and documentation.\par

## 2021-02-27 ENCOUNTER — RESULT REVIEW (OUTPATIENT)
Age: 71
End: 2021-02-27

## 2021-02-27 ENCOUNTER — OUTPATIENT (OUTPATIENT)
Dept: OUTPATIENT SERVICES | Facility: HOSPITAL | Age: 71
LOS: 1 days | End: 2021-02-27
Payer: MEDICARE

## 2021-02-27 ENCOUNTER — APPOINTMENT (OUTPATIENT)
Dept: CT IMAGING | Facility: CLINIC | Age: 71
End: 2021-02-27
Payer: MEDICARE

## 2021-02-27 DIAGNOSIS — M25.50 PAIN IN UNSPECIFIED JOINT: ICD-10-CM

## 2021-02-27 PROCEDURE — 73701 CT LOWER EXTREMITY W/DYE: CPT | Mod: 26,RT,MF

## 2021-02-27 PROCEDURE — 82565 ASSAY OF CREATININE: CPT

## 2021-02-27 PROCEDURE — G1004: CPT

## 2021-02-27 PROCEDURE — 73701 CT LOWER EXTREMITY W/DYE: CPT

## 2021-03-16 ENCOUNTER — APPOINTMENT (OUTPATIENT)
Dept: ORTHOPEDIC SURGERY | Facility: CLINIC | Age: 71
End: 2021-03-16
Payer: MEDICARE

## 2021-03-16 DIAGNOSIS — M17.11 UNILATERAL PRIMARY OSTEOARTHRITIS, RIGHT KNEE: ICD-10-CM

## 2021-03-16 PROCEDURE — 99214 OFFICE O/P EST MOD 30 MIN: CPT

## 2021-03-16 PROCEDURE — 73564 X-RAY EXAM KNEE 4 OR MORE: CPT | Mod: RT

## 2021-03-16 NOTE — DISCUSSION/SUMMARY
[de-identified] : This patient has severe degenerative disc disease of the lumbar spine possible anterolisthesis of the lumbar spine and a painful right total knee arthroplasty.  On examination and radiographically I see no evidence of failure of the right total knee arthroplasty at this time.  Given her lumbar spine history and the fact that the pain is actually worse when she lies down at night and only really hurts her when she lies flat I suggested that she has a consultation with one of my spine surgery partners to determine if there is possible nerve root impingement component of this.  She should continue with physical therapy for the right knee.  She should follow up with Dr. Mendoza for this.  No evidence or reason for revision total knee arthroplasty at this time.  Follow-up on a as needed basis.

## 2021-03-16 NOTE — PHYSICAL EXAM
[de-identified] : Patient is well nourished, well-developed, in no acute distress, with appropriate mood and affect. The patient is oriented to time, place, and person. Respirations are even and unlabored. Gait evaluation does not reveal a limp. There is no inguinal adenopathy. Examination of the contralateral knee shows normal range of motion, strength, no tenderness, and intact skin. The operative limb is well-perfused, has a well appearing surgical scar, and shows a grossly normal motor and sensory examination. Knee motion is painless and the right knee moves from 0 to 125 degrees. The knee is stable within that range-of-motion to AP and ML stress. The alignment of the knee is neutral. Muscle strength is normal. Quadriceps and hamstring muscle strength is normal bilaterally. Pedal pulses are palpable.  [de-identified] : AP, lateral, sunrise knee x-rays of the right knee were ordered and obtained in the office and demonstrate satisfactory position and alignment of the components are present. No signs of loosening are seen.\par \par This patient brings with her a CT scan of the right knee.  I reviewed the CT imaging with the patient which demonstrates right total knee arthroplasty in proper position and alignment and no evidence of failure.\par AP and lateral radiographs of the lumbar spine were ordered obtained the office demonstrate a flat back deformity with a retroverted pelvis and is severely osteoporotic bone of the lumbar spine.  There is possible suggestion of anterolisthesis at the lumbosacral junction although it is difficult to see this because the bone is so osteopenic.\par

## 2021-03-16 NOTE — HISTORY OF PRESENT ILLNESS
[de-identified] : This is very nice 71-year-old female experiencing right knee pain, which is severe in intensity.  She states that she underwent a right total knee arthroplasty 4 months ago in November 2020 by Dr. Mendoza at the [facility] for special surgery.  Since then she has had increasing pain in the right knee that is actually worse than before her index operation.  She states that she has pain in the anterolateral aspect of the knee.  She has no pain with weightbearing and no pain walking up and down stairs or walking around during the day.  She really only has pain when she lies down in bed at night.  No numbness or tingling or weakness in the leg.  She has extensive spine history including Bradford rods that required removal and abort of the procedure because of hemorrhage.  She also was complicated by DVT and PE.  No fevers or chills in the knee.  She has been seen by Dr. Mendoza tells her that her total knee arthroplasty is functioning well.  She takes NSAIDs which do not help.  The gabapentin does help mildly.  Ambulates with a walker.  Has done physical therapy which does not help.  Walking tolerance is limited to activities daily living are limited.

## 2021-03-22 ENCOUNTER — APPOINTMENT (OUTPATIENT)
Dept: ORTHOPEDIC SURGERY | Facility: CLINIC | Age: 71
End: 2021-03-22
Payer: MEDICARE

## 2021-03-22 VITALS
HEIGHT: 59 IN | OXYGEN SATURATION: 98 % | DIASTOLIC BLOOD PRESSURE: 75 MMHG | WEIGHT: 158 LBS | RESPIRATION RATE: 65 BRPM | SYSTOLIC BLOOD PRESSURE: 151 MMHG | BODY MASS INDEX: 31.85 KG/M2

## 2021-03-22 PROCEDURE — 99213 OFFICE O/P EST LOW 20 MIN: CPT

## 2021-03-23 ENCOUNTER — APPOINTMENT (OUTPATIENT)
Dept: ORTHOPEDIC SURGERY | Facility: CLINIC | Age: 71
End: 2021-03-23
Payer: MEDICARE

## 2021-03-23 PROCEDURE — 99213 OFFICE O/P EST LOW 20 MIN: CPT

## 2021-03-24 NOTE — CONSULT LETTER
[Dear  ___] : Dear  [unfilled], [FreeTextEntry1] : I had the pleasure of evaluating your patient in the office today for complaints of right knee pain secondary to patellofemoral pain due to flexion contracture. I have enclosed a copy of today's office notes for your charts and for your review.\par \par Sincerely, \par \par Hank Jensen M.D.\par Professor and \par Department of Orthopedic Surgery\par [facility] [place]\par

## 2021-03-24 NOTE — HISTORY OF PRESENT ILLNESS
[de-identified] : 70 y/o female with history of right TSR about 10 years ago and right TKR in 11/4/2020 by DR. Catalino Mendoza presents with continued right knee pain since the surgery. She states that most of the pain is when she is lying down in her bed. She was seen recently by Dr. Riddle and was told it was not coming from her back. She denies any numbness or tingling. She does not have pain at this visit.

## 2021-03-24 NOTE — PHYSICAL EXAM
[LE] : Sensory: Intact in bilateral lower extremities [Knee] : patellar 2+ and symmetric bilaterally [de-identified] : 70 y/o pleasant  Female in NAD and AAOx3. 31.9 BMI. The pt has a mildly antalgic gait. Physical examination of both knees reveals normal contours, healed surgical scar with no signs of infection, no erythema, no swelling, no effusion, no distal lymphedema or phlebitis, no patholaxity. Notable flexion contracture. ROM of the right knee reveals -° Strength is 5/5 within this arc of motion. There is minimal pain on palpation to the medial/lateral joint line. There is ttp on the lateral patella. +Tibiofemoral crepitus. There are no neurological deficits. [de-identified] : X-rays were interpreted by me today: 4 views of the right knee (3/16/21, [facility] PACs) demonstrate a bony fragment on the medal patella, s/p R TKA in normal position and alignment, with no acute fracture or dislocation.\par

## 2021-03-26 ENCOUNTER — APPOINTMENT (OUTPATIENT)
Dept: ORTHOPEDIC SURGERY | Facility: CLINIC | Age: 71
End: 2021-03-26

## 2021-04-21 ENCOUNTER — APPOINTMENT (OUTPATIENT)
Dept: NEUROLOGY | Facility: CLINIC | Age: 71
End: 2021-04-21
Payer: MEDICARE

## 2021-04-21 VITALS
DIASTOLIC BLOOD PRESSURE: 79 MMHG | HEIGHT: 59 IN | WEIGHT: 161 LBS | HEART RATE: 69 BPM | BODY MASS INDEX: 32.46 KG/M2 | SYSTOLIC BLOOD PRESSURE: 149 MMHG

## 2021-04-21 VITALS — TEMPERATURE: 97.9 F

## 2021-04-21 DIAGNOSIS — G62.9 POLYNEUROPATHY, UNSPECIFIED: ICD-10-CM

## 2021-04-21 DIAGNOSIS — M54.16 RADICULOPATHY, LUMBAR REGION: ICD-10-CM

## 2021-04-21 PROCEDURE — 99204 OFFICE O/P NEW MOD 45 MIN: CPT

## 2021-04-22 NOTE — PHYSICAL EXAM
[FreeTextEntry1] : General examination is unremarkable and she is very pleasant cooperative and appears to have good consistent history and delineation of her issues without current expression of anxiety or depression is able to drive.  There is no carotid bruit, thyromegaly or lymphadenopathy.  Chest is clear and heart sounds are normal.  Abdomen is soft.  Pedal pulsations are normal.\par \par There is evidence of multiple scars of spine surgery and right knee surgery.  There is no Tinel sign at wrist or knee joint areas and pedal pulsations are normal and she walks with a walker and has a wheelchair facility to sit.  She was able to ambulate from the wheelchair to the examining table with a kyphotic posture.  There are no meningeal signs and straight leg raising test is negative but is restricted due to significant pain in her joint and stiffness.\par \par Neurological examination reveals her to be alert oriented without any memory language cognitive or behavioral dysfunction.  Cranial nerve examination reveals normal disks brisk pupils full extraocular movements normal visual fields and there is no facial weakness hearing and bulbar functions are intact.  Upper extremity evaluation reveals minimal weakness of the left abductor pollicis brevis muscle and minimal decreased sensation in the median nerve distribution but all distal and proximal muscles appear 5/5 without atrophy fasciculation or abnormal movements with symmetric 1+ reflexes and there is no incoordination of the hands or fingers.\par \par Lower extremity evaluation reveals trace knee reflex and 1+ ankle reflexes bilaterally and there is no Babinski sign.  She has high arched feet and hammertoes and there is decreased sensation distally in both feet to pinprick vibration and cold sensation but position sense is normal.  There is no sensory level and no dermatomal pattern could be identified.  Muscle strength is 3-4/5 in the inverters and everters of both feet but dorsiflexors are near normal including the foot and toes and plantar flexion is -5/5 bilaterally.  The ankle joint motion is not restricted and has distal hypotonia.  She is able to dress herself and walks with a walker and Romberg sign is negative.

## 2021-04-22 NOTE — REVIEW OF SYSTEMS
[Feeling Poorly] : feeling poorly [Leg Weakness] : leg weakness [Poor Coordination] : poor coordination [Numbness] : numbness [Tingling] : tingling [Abnormal Sensation] : an abnormal sensation [Difficulty Walking] : difficulty walking [Ataxia] : ataxia [Limping] : limping [Anxiety] : anxiety [As Noted in HPI] : as noted in HPI [Arthralgias] : arthralgias [Joint Pain] : joint pain [Joint Stiffness] : joint stiffness [Negative] : Heme/Lymph

## 2021-04-22 NOTE — DATA REVIEWED
[de-identified] : There are extensive records of radiologic investigations and imaging studies of the spine including history of prior surgeries. [de-identified] : There have been no electrodiagnostic studies in the chart. [de-identified] : There are extensive consultation reports and procedural techniques which were all reviewed but there is no pertinence to her neurological issues.

## 2021-04-22 NOTE — DISCUSSION/SUMMARY
[FreeTextEntry1] : Opinion–the patient has multiple orthopedic and neurological issues.  She has features of distal neuropathy with profound weakness of the foot muscles with hammertoes and high arched feet with family history of similar issues and raises a strong suspicion of hereditary sensorimotor polyneuropathy but surprisingly her ankle reflexes are present bilaterally.  Whether it is due to undiscovered subtle myelopathy remains uncertain.\par \par The patient also has restless leg syndrome now treated with ropinirole and chronic pain syndrome treated with methadone and she is trying to get off methadone and was advised that the my major treatment for restless leg syndrome is ropinirole and similar drugs and in my opinion methadone has no role in the treatment of RLS.  She understands and will discuss this matter with her internist.\par \par The patient also has features of left carpal tunnel syndrome.\par \par The patient was therefore advised a thorough electrophysiologic testing of the upper and lower extremities and appropriate appointment has been given and further investigations will depend upon the electrophysiologic findings meanwhile she was advised to continue with her orthopedic surgeon and her internist to monitor if there is any thyroid dysfunction and general good health maintenance and fall precautions were discussed.  Education and counseling was provided and she seems to understand.

## 2021-04-22 NOTE — HISTORY OF PRESENT ILLNESS
[FreeTextEntry1] : Ms. French is a 71-year-old  female referred by Dr. Montemayor for neurological consultation.\par \par Background history–the patient has multiple medical and orthopedic issues and stated that she had right knee surgery on 11/4/2020 at [facility] and had a very good outcome however since the surgical procedure she developed severe pain in the right knee only underlying down and now even when sitting but is relieved by walking and the pain is described as sharp and a deep aching sensation limited to the knee joint without any radicular symptomatology and there is no associated numbness or tingling or any additional discernible weakness and was reevaluated by the orthopedic surgeon who did not provide any further treatment as the knee surgery itself was very successful.\par \par She also had extensive thoracospine and lumbar spine surgery between the ears 1985 through 1991 and has mostly successful outcome but there is continued thoracolumbar spinal discomfort and walks with a kyphotic posture and uses a portable walker and a wheelchair but is able to ambulate short distances.\par \par Major complaints at the present time consists of unstable ankles bilaterally with weak foot and was evaluated by a podiatrist who was unable to prescribe any treatment or splints as he was not certain regarding the orthopedic and neurologic deficits and suggested neurological consultation and has been following her for 2 years.  Review of systems is pertinent for restless leg syndrome, chronic pain syndrome treated with methadone for 9 years by Dr. Carr and has history of hypertension under control including a thyroid nodule history of scoliosis and the medical chart reveals that she possibly had COVID-19 infection and has been vaccinated.  Review of system also revealed thumb index and middle finger tingling and numbness on the left since 2 years and there is history of frequent falls.\par \par She is 71 years old  is a former smoker does not abuse alcohol and has several children and is currently retired and one of her son has intestinal issues and is handicapped.\par \par Family history is pertinent that mother had distal neuropathy including other members of the family with high arched feet and hammertoes but the details of their investigations and diagnosis is not available.\par \par I reviewed her medications and allergies.

## 2021-04-29 ENCOUNTER — RX RENEWAL (OUTPATIENT)
Age: 71
End: 2021-04-29

## 2021-05-13 ENCOUNTER — APPOINTMENT (OUTPATIENT)
Dept: ORTHOPEDIC SURGERY | Facility: CLINIC | Age: 71
End: 2021-05-13
Payer: MEDICARE

## 2021-05-13 ENCOUNTER — APPOINTMENT (OUTPATIENT)
Dept: NEUROLOGY | Facility: CLINIC | Age: 71
End: 2021-05-13
Payer: MEDICARE

## 2021-05-13 PROCEDURE — 95886 MUSC TEST DONE W/N TEST COMP: CPT

## 2021-05-13 PROCEDURE — 95910 NRV CNDJ TEST 7-8 STUDIES: CPT

## 2021-05-13 PROCEDURE — 99213 OFFICE O/P EST LOW 20 MIN: CPT

## 2021-05-13 NOTE — HISTORY OF PRESENT ILLNESS
[de-identified] : 70 y/o female with history of right TSR about 10 years ago and right TKR in 11/4/2020 by DR. Catalino Mendoza presents with continued right knee pain since the surgery. She was seen and has possible right knee patellofemoral syndrome due to contractures. She states that her knee still bothers her and is getting worse. She does think that the brace is helping her. She is walking with a rolling walker. She staets the pain is a 3/10 at this visit.

## 2021-05-13 NOTE — CONSULT LETTER
[Dear  ___] : Dear  [unfilled], [Please see my note below.] : Please see my note below. [FreeTextEntry1] : I had the pleasure of evaluating your patient in the office today for complaints of right knee pain secondary to patellofemoral pain due to flexion contracture. I have enclosed a copy of today's office notes for your charts and for your review. The patient would like to undergo arthroscopic surgery of her right knee to evaluate the cause of her pain and a possible lateral retinacular release. She will require full medical clearance prior to undergoing surgery.\par \par Sincerely, \par \par Hank Jensen M.D.\par Professor and \par Department of Orthopedic Surgery\par [facility] [place]\par

## 2021-05-13 NOTE — PHYSICAL EXAM
[LE] : Sensory: Intact in bilateral lower extremities [Knee] : patellar 2+ and symmetric bilaterally [de-identified] : 70 y/o pleasant  Female in NAD and AAOx3. 31.9 BMI. The pt has a mildly antalgic gait. Physical examination of both knees reveals normal contours, healed surgical scar with no signs of infection, no erythema, no swelling, no effusion, no distal lymphedema or phlebitis, no patholaxity. Notable flexion contracture. ROM of the right knee reveals -° with pain at end ROM. Strength is 5/5 within this arc of motion. There is minimal pain on palpation to the medial/lateral joint line. There is ttp on the lateral patella and retinaculum greater than the medial side. +Tibiofemoral crepitus. There are no neurological deficits. [de-identified] : X-rays were interpreted by me today: 4 views of the right knee (3/16/21, [facility] PACs) demonstrate a bony fragment on the medal patella, s/p R TKA in normal position and alignment, no signs of component loosening, with no acute fracture or dislocation.\par

## 2021-05-13 NOTE — DISCUSSION/SUMMARY
[Surgical risks reviewed] : Surgical risks reviewed [de-identified] : 72 y/o female with right knee pain secondary to patellofemoral pain due to flexion contracture. A lengthy discussion was held regarding the patients condition and treatment options including all risks, benefits, prognosis and outcomes of each were discussed in detail including conservative treatment and non-operative treatment. It was discussed with the patient that she may seek a second opinion with a total knee revisionist and was given the contact information for Dr. Menjivar. It was also discussed with the patient that may benefit from arthroscopic surgery of the right knee to get a better appreciation for the cause of her right knee pain and a possible lateral retinacular release. The risks and benefits of arthroscopic surgery were discussed with the patient and at this time she would like to proceed with arthroscopic surgery and possible lateral release. PT was also recommended and new a prescription was provided, as we felt this would help improve her motion. The patient will contact me if there are any concerns. The patient will require full clearance prior to surgery and will schedule surgery at her earliest convenience. The patient expressed understanding and all questions were answered.\par \par

## 2021-05-20 ENCOUNTER — OUTPATIENT (OUTPATIENT)
Dept: OUTPATIENT SERVICES | Facility: HOSPITAL | Age: 71
LOS: 1 days | End: 2021-05-20
Payer: MEDICARE

## 2021-05-20 VITALS
OXYGEN SATURATION: 98 % | WEIGHT: 160.06 LBS | HEART RATE: 72 BPM | RESPIRATION RATE: 14 BRPM | SYSTOLIC BLOOD PRESSURE: 114 MMHG | HEIGHT: 62 IN | TEMPERATURE: 97 F | DIASTOLIC BLOOD PRESSURE: 73 MMHG

## 2021-05-20 DIAGNOSIS — T84.84XA PAIN DUE TO INTERNAL ORTHOPEDIC PROSTHETIC DEVICES, IMPLANTS AND GRAFTS, INITIAL ENCOUNTER: ICD-10-CM

## 2021-05-20 DIAGNOSIS — Z01.818 ENCOUNTER FOR OTHER PREPROCEDURAL EXAMINATION: ICD-10-CM

## 2021-05-20 LAB
ANION GAP SERPL CALC-SCNC: 13 MMOL/L — SIGNIFICANT CHANGE UP (ref 5–17)
BUN SERPL-MCNC: 30 MG/DL — HIGH (ref 7–23)
CALCIUM SERPL-MCNC: 10.3 MG/DL — SIGNIFICANT CHANGE UP (ref 8.4–10.5)
CHLORIDE SERPL-SCNC: 101 MMOL/L — SIGNIFICANT CHANGE UP (ref 96–108)
CO2 SERPL-SCNC: 25 MMOL/L — SIGNIFICANT CHANGE UP (ref 22–31)
CREAT SERPL-MCNC: 0.9 MG/DL — SIGNIFICANT CHANGE UP (ref 0.5–1.3)
GLUCOSE SERPL-MCNC: 81 MG/DL — SIGNIFICANT CHANGE UP (ref 70–99)
HCT VFR BLD CALC: 39.8 % — SIGNIFICANT CHANGE UP (ref 34.5–45)
HGB BLD-MCNC: 12.6 G/DL — SIGNIFICANT CHANGE UP (ref 11.5–15.5)
MCHC RBC-ENTMCNC: 26.5 PG — LOW (ref 27–34)
MCHC RBC-ENTMCNC: 31.7 GM/DL — LOW (ref 32–36)
MCV RBC AUTO: 83.8 FL — SIGNIFICANT CHANGE UP (ref 80–100)
NRBC # BLD: 0 /100 WBCS — SIGNIFICANT CHANGE UP (ref 0–0)
PLATELET # BLD AUTO: 239 K/UL — SIGNIFICANT CHANGE UP (ref 150–400)
POTASSIUM SERPL-MCNC: 4.4 MMOL/L — SIGNIFICANT CHANGE UP (ref 3.5–5.3)
POTASSIUM SERPL-SCNC: 4.4 MMOL/L — SIGNIFICANT CHANGE UP (ref 3.5–5.3)
RBC # BLD: 4.75 M/UL — SIGNIFICANT CHANGE UP (ref 3.8–5.2)
RBC # FLD: 13.7 % — SIGNIFICANT CHANGE UP (ref 10.3–14.5)
SODIUM SERPL-SCNC: 139 MMOL/L — SIGNIFICANT CHANGE UP (ref 135–145)
WBC # BLD: 4.02 K/UL — SIGNIFICANT CHANGE UP (ref 3.8–10.5)
WBC # FLD AUTO: 4.02 K/UL — SIGNIFICANT CHANGE UP (ref 3.8–10.5)

## 2021-05-20 PROCEDURE — 85027 COMPLETE CBC AUTOMATED: CPT

## 2021-05-20 PROCEDURE — 80048 BASIC METABOLIC PNL TOTAL CA: CPT

## 2021-05-20 PROCEDURE — G0463: CPT

## 2021-05-20 RX ORDER — METHADONE HYDROCHLORIDE 40 MG/1
3 TABLET ORAL
Qty: 0 | Refills: 0 | DISCHARGE

## 2021-05-20 RX ORDER — DULOXETINE HYDROCHLORIDE 30 MG/1
1 CAPSULE, DELAYED RELEASE ORAL
Qty: 0 | Refills: 0 | DISCHARGE

## 2021-05-20 RX ORDER — ASPIRIN/CALCIUM CARB/MAGNESIUM 324 MG
1 TABLET ORAL
Qty: 0 | Refills: 0 | DISCHARGE

## 2021-05-20 RX ORDER — PANTOPRAZOLE SODIUM 20 MG/1
1 TABLET, DELAYED RELEASE ORAL
Qty: 0 | Refills: 0 | DISCHARGE

## 2021-05-20 RX ORDER — ROPINIROLE 8 MG/1
1.5 TABLET, FILM COATED, EXTENDED RELEASE ORAL
Qty: 0 | Refills: 0 | DISCHARGE

## 2021-05-20 RX ORDER — TIZANIDINE 4 MG/1
0 TABLET ORAL
Qty: 0 | Refills: 0 | DISCHARGE

## 2021-05-20 NOTE — H&P PST ADULT - NSICDXPROBLEM_GEN_ALL_CORE_FT
PROBLEM DIAGNOSES  Problem: Pain due to internal orthopedic prosthetic device, initial encounter  Assessment and Plan: Scheduled for Right knee arthroscopic adhesiolysis with possible lateral retinacular release.  Preop instructions given to patient, may take morning meds with sips of water.  Labs pending cbc, bmp.  COVID test 5/31/21 at [facility] at 1130  Medical evaluation 5/25/21

## 2021-05-20 NOTE — H&P PST ADULT - HISTORY OF PRESENT ILLNESS
Ms. French is a 71 year old woman with PMH HTN, depression, RLS on Methadone, allergic rhinitis, scoliosis had Bradford kaveh placed but removed many years later, OA s/p R TKR last year at [facility], then developed COVID once recovered she developed R knee pain 5/10 and has great difficulty walking (uses a walker and brace) or climbing stairs she is now scheduled for R knee arthroscopic adhesiolysis with possible lateral retinacular release.  She has a long time ago hx of multiple PEs after the Bradford Kaveh (1984) was placed was on coumadin previously and has had no problem since and no longer on anticoagulants.    Denies s/s of COVID, denies international travel    COVID testing 5/31/21 at 1130 at [facility]

## 2021-05-20 NOTE — H&P PST ADULT - NSICDXPASTMEDICALHX_GEN_ALL_CORE_FT
PAST MEDICAL HISTORY:  Depression     Essential hypertension     Fungal infection of skin under breast    GERD (gastroesophageal reflux disease)     H/O scoliosis     H/O seasonal allergies     History of pulmonary embolism after billings abigail surgery,1984 treated with coumadin 3 months, no issues after    OA (osteoarthritis)     Osteoporosis     Restless leg syndrome

## 2021-05-20 NOTE — H&P PST ADULT - NEUROLOGICAL DETAILS
alert and oriented x 3/responds to pain/responds to verbal commands/sensation intact/strength decreased

## 2021-05-20 NOTE — H&P PST ADULT - NSANTHOSAYNRD_GEN_A_CORE
No. SILVANA screening performed.  STOP BANG Legend: 0-2 = LOW Risk; 3-4 = INTERMEDIATE Risk; 5-8 = HIGH Risk

## 2021-05-25 ENCOUNTER — APPOINTMENT (OUTPATIENT)
Dept: INTERNAL MEDICINE | Facility: CLINIC | Age: 71
End: 2021-05-25
Payer: MEDICARE

## 2021-05-25 VITALS
HEART RATE: 74 BPM | RESPIRATION RATE: 14 BRPM | SYSTOLIC BLOOD PRESSURE: 110 MMHG | DIASTOLIC BLOOD PRESSURE: 60 MMHG | WEIGHT: 161 LBS | BODY MASS INDEX: 32.52 KG/M2

## 2021-05-25 DIAGNOSIS — U07.1 COVID-19: ICD-10-CM

## 2021-05-25 DIAGNOSIS — Z88.9 ALLERGY STATUS TO UNSPECIFIED DRUGS, MEDICAMENTS AND BIOLOGICAL SUBSTANCES: ICD-10-CM

## 2021-05-25 DIAGNOSIS — Z11.59 ENCOUNTER FOR SCREENING FOR OTHER VIRAL DISEASES: ICD-10-CM

## 2021-05-25 DIAGNOSIS — Z87.898 PERSONAL HISTORY OF OTHER SPECIFIED CONDITIONS: ICD-10-CM

## 2021-05-25 DIAGNOSIS — R31.29 OTHER MICROSCOPIC HEMATURIA: ICD-10-CM

## 2021-05-25 DIAGNOSIS — R73.09 OTHER ABNORMAL GLUCOSE: ICD-10-CM

## 2021-05-25 DIAGNOSIS — Z87.39 PERSONAL HISTORY OF OTHER DISEASES OF THE MUSCULOSKELETAL SYSTEM AND CONNECTIVE TISSUE: ICD-10-CM

## 2021-05-25 PROBLEM — M81.0 AGE-RELATED OSTEOPOROSIS WITHOUT CURRENT PATHOLOGICAL FRACTURE: Chronic | Status: ACTIVE | Noted: 2021-05-20

## 2021-05-25 PROBLEM — I10 ESSENTIAL (PRIMARY) HYPERTENSION: Chronic | Status: ACTIVE | Noted: 2021-05-20

## 2021-05-25 PROBLEM — F32.9 MAJOR DEPRESSIVE DISORDER, SINGLE EPISODE, UNSPECIFIED: Chronic | Status: ACTIVE | Noted: 2021-05-20

## 2021-05-25 PROCEDURE — 99214 OFFICE O/P EST MOD 30 MIN: CPT

## 2021-05-25 RX ORDER — ZOSTER VACCINE RECOMBINANT, ADJUVANTED 50 MCG/0.5
50 KIT INTRAMUSCULAR
Qty: 2 | Refills: 0 | Status: DISCONTINUED | OUTPATIENT
Start: 2019-10-24 | End: 2021-05-25

## 2021-05-25 RX ORDER — BENZONATATE 100 MG/1
100 CAPSULE ORAL 3 TIMES DAILY
Qty: 20 | Refills: 0 | Status: DISCONTINUED | COMMUNITY
Start: 2020-12-22 | End: 2021-05-25

## 2021-05-25 NOTE — ASSESSMENT
[FreeTextEntry1] : A 71 year female scheduled for knee arthroscopy.  She is an acceptable risk to proceed with the planned procedure.  Her  chronic diseases are medically optimized.\par \par She  has no history of coronary artery disease.  \par Her  RCRI score is 0\par The estimated risk of cardiac death,nonfatal MI or cardiac arrest is approximately 3.9%.\par The ECG dated 12/2020 reveals no worrisome findings. No additional cardiac testing is indicated at this time.\par \par She has been advised not to use aspirin containing products for seven days prior to the procedure, and NSAIDs for 5 days prior to the procedure. \par \par She has been advised to take the following medications on the  morning of surgery: chlorthalidone\par \par Medications can be resumed postoperatively once diet is resumed as blood pressure and fluid status tolerates.\par \par I have reviewed her blood count, electrolytes and creatinine; all were normal.  ECG was unremarkable. \par \par 34 minutes spent in preparation of this visit, including review of previous notes and test results, interview and examination of patient, discussion of plan, arranging for appropriate testing and treatment, and documentation.\par \par \par \par \par

## 2021-05-25 NOTE — HISTORY OF PRESENT ILLNESS
[Unable to assess] : unable to assess [Aortic Stenosis] : no aortic stenosis [Atrial Fibrillation] : no atrial fibrillation [Coronary Artery Disease] : no coronary artery disease [Recent Myocardial Infarction] : no recent myocardial infarction [Implantable Device/Pacemaker] : no implantable device/pacemaker [Asthma] : no asthma [COPD] : no COPD [Sleep Apnea] : no sleep apnea [Smoker] : not a smoker [Self] : no previous adverse anesthesia reaction [Chronic Anticoagulation] : no chronic anticoagulation [Chronic Kidney Disease] : no chronic kidney disease [Diabetes] : no diabetes [FreeTextEntry1] : Right knee arthroscopy [FreeTextEntry2] : 3 Toya 2021 [FreeTextEntry3] : Dr. Jensen [FreeTextEntry4] : Had right TKR a few years ago.  Since then has been having pain, despite radiographic stability.  Arthroscopy planned for diagnostic purposes.  [FreeTextEntry6] : Denies headache, dizziness.\par Denies cough, wheezing.\par Denies CP, SOB, POLLARD, edema, palpitations.\par Denies abdominal pain, N/V/D/C. Denies BRBPR, melena, dysphagia.\par Denies dysuria, frequency, hematuria, hesitancy.\par No bleeding or bruising tendencies.\par  [FreeTextEntry8] : Multiple previous surgeries without difficulty

## 2021-05-31 ENCOUNTER — OUTPATIENT (OUTPATIENT)
Dept: OUTPATIENT SERVICES | Facility: HOSPITAL | Age: 71
LOS: 1 days | End: 2021-05-31
Payer: MEDICARE

## 2021-05-31 DIAGNOSIS — Z11.52 ENCOUNTER FOR SCREENING FOR COVID-19: ICD-10-CM

## 2021-05-31 LAB — SARS-COV-2 RNA SPEC QL NAA+PROBE: SIGNIFICANT CHANGE UP

## 2021-05-31 PROCEDURE — U0005: CPT

## 2021-05-31 PROCEDURE — C9803: CPT

## 2021-05-31 PROCEDURE — U0003: CPT

## 2021-06-03 ENCOUNTER — EMERGENCY (EMERGENCY)
Facility: HOSPITAL | Age: 71
LOS: 1 days | Discharge: HOME | End: 2021-06-03
Attending: EMERGENCY MEDICINE
Payer: MEDICARE

## 2021-06-03 VITALS
HEART RATE: 79 BPM | RESPIRATION RATE: 17 BRPM | TEMPERATURE: 100 F | OXYGEN SATURATION: 97 % | DIASTOLIC BLOOD PRESSURE: 66 MMHG | SYSTOLIC BLOOD PRESSURE: 140 MMHG

## 2021-06-03 VITALS
SYSTOLIC BLOOD PRESSURE: 164 MMHG | HEART RATE: 65 BPM | OXYGEN SATURATION: 98 % | RESPIRATION RATE: 18 BRPM | WEIGHT: 164.91 LBS | DIASTOLIC BLOOD PRESSURE: 95 MMHG | HEIGHT: 62 IN

## 2021-06-03 LAB
ALBUMIN SERPL ELPH-MCNC: 4.5 G/DL — SIGNIFICANT CHANGE UP (ref 3.3–5)
ALP SERPL-CCNC: 115 U/L — SIGNIFICANT CHANGE UP (ref 40–120)
ALT FLD-CCNC: 15 U/L — SIGNIFICANT CHANGE UP (ref 10–45)
ANION GAP SERPL CALC-SCNC: 16 MMOL/L — SIGNIFICANT CHANGE UP (ref 5–17)
APPEARANCE UR: CLEAR — SIGNIFICANT CHANGE UP
AST SERPL-CCNC: 23 U/L — SIGNIFICANT CHANGE UP (ref 10–40)
BACTERIA # UR AUTO: NEGATIVE — SIGNIFICANT CHANGE UP
BASE EXCESS BLDV CALC-SCNC: 1.8 MMOL/L — SIGNIFICANT CHANGE UP (ref -2–2)
BASE EXCESS BLDV CALC-SCNC: 3.7 MMOL/L — HIGH (ref -2–2)
BASOPHILS # BLD AUTO: 0.03 K/UL — SIGNIFICANT CHANGE UP (ref 0–0.2)
BASOPHILS NFR BLD AUTO: 0.5 % — SIGNIFICANT CHANGE UP (ref 0–2)
BILIRUB SERPL-MCNC: 0.3 MG/DL — SIGNIFICANT CHANGE UP (ref 0.2–1.2)
BILIRUB UR-MCNC: NEGATIVE — SIGNIFICANT CHANGE UP
BUN SERPL-MCNC: 30 MG/DL — HIGH (ref 7–23)
CA-I SERPL-SCNC: 1.16 MMOL/L — SIGNIFICANT CHANGE UP (ref 1.12–1.3)
CA-I SERPL-SCNC: 1.17 MMOL/L — SIGNIFICANT CHANGE UP (ref 1.12–1.3)
CALCIUM SERPL-MCNC: 9.4 MG/DL — SIGNIFICANT CHANGE UP (ref 8.4–10.5)
CHLORIDE BLDV-SCNC: 100 MMOL/L — SIGNIFICANT CHANGE UP (ref 96–108)
CHLORIDE BLDV-SCNC: 101 MMOL/L — SIGNIFICANT CHANGE UP (ref 96–108)
CHLORIDE SERPL-SCNC: 96 MMOL/L — SIGNIFICANT CHANGE UP (ref 96–108)
CO2 BLDV-SCNC: 30 MMOL/L — SIGNIFICANT CHANGE UP (ref 22–30)
CO2 BLDV-SCNC: 31 MMOL/L — HIGH (ref 22–30)
CO2 SERPL-SCNC: 24 MMOL/L — SIGNIFICANT CHANGE UP (ref 22–31)
COLOR SPEC: SIGNIFICANT CHANGE UP
CREAT SERPL-MCNC: 0.81 MG/DL — SIGNIFICANT CHANGE UP (ref 0.5–1.3)
DIFF PNL FLD: NEGATIVE — SIGNIFICANT CHANGE UP
EOSINOPHIL # BLD AUTO: 0 K/UL — SIGNIFICANT CHANGE UP (ref 0–0.5)
EOSINOPHIL NFR BLD AUTO: 0 % — SIGNIFICANT CHANGE UP (ref 0–6)
EPI CELLS # UR: 1 /HPF — SIGNIFICANT CHANGE UP
GAS PNL BLDV: 135 MMOL/L — SIGNIFICANT CHANGE UP (ref 135–145)
GAS PNL BLDV: 136 MMOL/L — SIGNIFICANT CHANGE UP (ref 135–145)
GAS PNL BLDV: SIGNIFICANT CHANGE UP
GLUCOSE BLDV-MCNC: 149 MG/DL — HIGH (ref 70–99)
GLUCOSE BLDV-MCNC: 168 MG/DL — HIGH (ref 70–99)
GLUCOSE SERPL-MCNC: 166 MG/DL — HIGH (ref 70–99)
GLUCOSE UR QL: NEGATIVE — SIGNIFICANT CHANGE UP
HCO3 BLDV-SCNC: 28 MMOL/L — SIGNIFICANT CHANGE UP (ref 21–29)
HCO3 BLDV-SCNC: 30 MMOL/L — HIGH (ref 21–29)
HCT VFR BLD CALC: 37.6 % — SIGNIFICANT CHANGE UP (ref 34.5–45)
HCT VFR BLDA CALC: 39 % — SIGNIFICANT CHANGE UP (ref 39–50)
HCT VFR BLDA CALC: 42 % — SIGNIFICANT CHANGE UP (ref 39–50)
HGB BLD CALC-MCNC: 12.8 G/DL — SIGNIFICANT CHANGE UP (ref 11.5–15.5)
HGB BLD CALC-MCNC: 13.6 G/DL — SIGNIFICANT CHANGE UP (ref 11.5–15.5)
HGB BLD-MCNC: 12.1 G/DL — SIGNIFICANT CHANGE UP (ref 11.5–15.5)
HYALINE CASTS # UR AUTO: 0 /LPF — SIGNIFICANT CHANGE UP (ref 0–2)
IMM GRANULOCYTES NFR BLD AUTO: 0.3 % — SIGNIFICANT CHANGE UP (ref 0–1.5)
KETONES UR-MCNC: NEGATIVE — SIGNIFICANT CHANGE UP
LACTATE BLDV-MCNC: 2.7 MMOL/L — HIGH (ref 0.7–2)
LACTATE BLDV-MCNC: 2.7 MMOL/L — HIGH (ref 0.7–2)
LEUKOCYTE ESTERASE UR-ACNC: ABNORMAL
LIDOCAIN IGE QN: 20 U/L — SIGNIFICANT CHANGE UP (ref 7–60)
LYMPHOCYTES # BLD AUTO: 0.75 K/UL — LOW (ref 1–3.3)
LYMPHOCYTES # BLD AUTO: 11.4 % — LOW (ref 13–44)
MCHC RBC-ENTMCNC: 27 PG — SIGNIFICANT CHANGE UP (ref 27–34)
MCHC RBC-ENTMCNC: 32.2 GM/DL — SIGNIFICANT CHANGE UP (ref 32–36)
MCV RBC AUTO: 83.9 FL — SIGNIFICANT CHANGE UP (ref 80–100)
MONOCYTES # BLD AUTO: 0.09 K/UL — SIGNIFICANT CHANGE UP (ref 0–0.9)
MONOCYTES NFR BLD AUTO: 1.4 % — LOW (ref 2–14)
NEUTROPHILS # BLD AUTO: 5.71 K/UL — SIGNIFICANT CHANGE UP (ref 1.8–7.4)
NEUTROPHILS NFR BLD AUTO: 86.4 % — HIGH (ref 43–77)
NITRITE UR-MCNC: NEGATIVE — SIGNIFICANT CHANGE UP
NRBC # BLD: 0 /100 WBCS — SIGNIFICANT CHANGE UP (ref 0–0)
PCO2 BLDV: 52 MMHG — HIGH (ref 35–50)
PCO2 BLDV: 53 MMHG — HIGH (ref 35–50)
PH BLDV: 7.34 — LOW (ref 7.35–7.45)
PH BLDV: 7.37 — SIGNIFICANT CHANGE UP (ref 7.35–7.45)
PH UR: 7.5 — SIGNIFICANT CHANGE UP (ref 5–8)
PLATELET # BLD AUTO: 235 K/UL — SIGNIFICANT CHANGE UP (ref 150–400)
PO2 BLDV: 22 MMHG — LOW (ref 25–45)
PO2 BLDV: 28 MMHG — SIGNIFICANT CHANGE UP (ref 25–45)
POTASSIUM BLDV-SCNC: 3.4 MMOL/L — LOW (ref 3.5–5.3)
POTASSIUM BLDV-SCNC: 3.6 MMOL/L — SIGNIFICANT CHANGE UP (ref 3.5–5.3)
POTASSIUM SERPL-MCNC: 3.5 MMOL/L — SIGNIFICANT CHANGE UP (ref 3.5–5.3)
POTASSIUM SERPL-SCNC: 3.5 MMOL/L — SIGNIFICANT CHANGE UP (ref 3.5–5.3)
PROT SERPL-MCNC: 7.5 G/DL — SIGNIFICANT CHANGE UP (ref 6–8.3)
PROT UR-MCNC: SIGNIFICANT CHANGE UP
RBC # BLD: 4.48 M/UL — SIGNIFICANT CHANGE UP (ref 3.8–5.2)
RBC # FLD: 13.3 % — SIGNIFICANT CHANGE UP (ref 10.3–14.5)
RBC CASTS # UR COMP ASSIST: 6 /HPF — HIGH (ref 0–4)
SAO2 % BLDV: 34 % — LOW (ref 67–88)
SAO2 % BLDV: 45 % — LOW (ref 67–88)
SODIUM SERPL-SCNC: 136 MMOL/L — SIGNIFICANT CHANGE UP (ref 135–145)
SP GR SPEC: 1.04 — HIGH (ref 1.01–1.02)
TROPONIN T, HIGH SENSITIVITY RESULT: 19 NG/L — SIGNIFICANT CHANGE UP (ref 0–51)
UROBILINOGEN FLD QL: NEGATIVE — SIGNIFICANT CHANGE UP
WBC # BLD: 6.6 K/UL — SIGNIFICANT CHANGE UP (ref 3.8–10.5)
WBC # FLD AUTO: 6.6 K/UL — SIGNIFICANT CHANGE UP (ref 3.8–10.5)
WBC UR QL: 39 /HPF — HIGH (ref 0–5)

## 2021-06-03 PROCEDURE — 84132 ASSAY OF SERUM POTASSIUM: CPT

## 2021-06-03 PROCEDURE — 93005 ELECTROCARDIOGRAM TRACING: CPT

## 2021-06-03 PROCEDURE — 86803 HEPATITIS C AB TEST: CPT

## 2021-06-03 PROCEDURE — 96374 THER/PROPH/DIAG INJ IV PUSH: CPT

## 2021-06-03 PROCEDURE — 82435 ASSAY OF BLOOD CHLORIDE: CPT

## 2021-06-03 PROCEDURE — 86140 C-REACTIVE PROTEIN: CPT

## 2021-06-03 PROCEDURE — U0003: CPT

## 2021-06-03 PROCEDURE — 83735 ASSAY OF MAGNESIUM: CPT

## 2021-06-03 PROCEDURE — 99284 EMERGENCY DEPT VISIT MOD MDM: CPT | Mod: 25

## 2021-06-03 PROCEDURE — 80076 HEPATIC FUNCTION PANEL: CPT

## 2021-06-03 PROCEDURE — 80053 COMPREHEN METABOLIC PANEL: CPT

## 2021-06-03 PROCEDURE — 83880 ASSAY OF NATRIURETIC PEPTIDE: CPT

## 2021-06-03 PROCEDURE — 81001 URINALYSIS AUTO W/SCOPE: CPT

## 2021-06-03 PROCEDURE — 82565 ASSAY OF CREATININE: CPT

## 2021-06-03 PROCEDURE — 96375 TX/PRO/DX INJ NEW DRUG ADDON: CPT

## 2021-06-03 PROCEDURE — 84100 ASSAY OF PHOSPHORUS: CPT

## 2021-06-03 PROCEDURE — 85730 THROMBOPLASTIN TIME PARTIAL: CPT

## 2021-06-03 PROCEDURE — 85610 PROTHROMBIN TIME: CPT

## 2021-06-03 PROCEDURE — 83690 ASSAY OF LIPASE: CPT

## 2021-06-03 PROCEDURE — 87086 URINE CULTURE/COLONY COUNT: CPT

## 2021-06-03 PROCEDURE — 85014 HEMATOCRIT: CPT

## 2021-06-03 PROCEDURE — 83615 LACTATE (LD) (LDH) ENZYME: CPT

## 2021-06-03 PROCEDURE — 82330 ASSAY OF CALCIUM: CPT

## 2021-06-03 PROCEDURE — 93010 ELECTROCARDIOGRAM REPORT: CPT

## 2021-06-03 PROCEDURE — 83605 ASSAY OF LACTIC ACID: CPT

## 2021-06-03 PROCEDURE — 84145 PROCALCITONIN (PCT): CPT

## 2021-06-03 PROCEDURE — 99284 EMERGENCY DEPT VISIT MOD MDM: CPT

## 2021-06-03 PROCEDURE — 96374 THER/PROPH/DIAG INJ IV PUSH: CPT | Mod: XU

## 2021-06-03 PROCEDURE — 36415 COLL VENOUS BLD VENIPUNCTURE: CPT

## 2021-06-03 PROCEDURE — 71045 X-RAY EXAM CHEST 1 VIEW: CPT

## 2021-06-03 PROCEDURE — 85025 COMPLETE CBC W/AUTO DIFF WBC: CPT

## 2021-06-03 PROCEDURE — 84484 ASSAY OF TROPONIN QUANT: CPT

## 2021-06-03 PROCEDURE — 99285 EMERGENCY DEPT VISIT HI MDM: CPT | Mod: 25

## 2021-06-03 PROCEDURE — 82728 ASSAY OF FERRITIN: CPT

## 2021-06-03 PROCEDURE — 74177 CT ABD & PELVIS W/CONTRAST: CPT

## 2021-06-03 PROCEDURE — 85018 HEMOGLOBIN: CPT

## 2021-06-03 PROCEDURE — 85379 FIBRIN DEGRADATION QUANT: CPT

## 2021-06-03 PROCEDURE — 82947 ASSAY GLUCOSE BLOOD QUANT: CPT

## 2021-06-03 PROCEDURE — 74177 CT ABD & PELVIS W/CONTRAST: CPT | Mod: 26,MB

## 2021-06-03 PROCEDURE — 84295 ASSAY OF SERUM SODIUM: CPT

## 2021-06-03 PROCEDURE — 86769 SARS-COV-2 COVID-19 ANTIBODY: CPT

## 2021-06-03 PROCEDURE — 80048 BASIC METABOLIC PNL TOTAL CA: CPT

## 2021-06-03 PROCEDURE — 82803 BLOOD GASES ANY COMBINATION: CPT

## 2021-06-03 RX ORDER — METHADONE HYDROCHLORIDE 40 MG/1
10 TABLET ORAL ONCE
Refills: 0 | Status: DISCONTINUED | OUTPATIENT
Start: 2021-06-03 | End: 2021-06-03

## 2021-06-03 RX ORDER — SODIUM CHLORIDE 9 MG/ML
1000 INJECTION INTRAMUSCULAR; INTRAVENOUS; SUBCUTANEOUS ONCE
Refills: 0 | Status: COMPLETED | OUTPATIENT
Start: 2021-06-03 | End: 2021-06-03

## 2021-06-03 RX ORDER — ONDANSETRON 8 MG/1
4 TABLET, FILM COATED ORAL ONCE
Refills: 0 | Status: COMPLETED | OUTPATIENT
Start: 2021-06-03 | End: 2021-06-03

## 2021-06-03 RX ORDER — FAMOTIDINE 10 MG/ML
20 INJECTION INTRAVENOUS ONCE
Refills: 0 | Status: COMPLETED | OUTPATIENT
Start: 2021-06-03 | End: 2021-06-03

## 2021-06-03 RX ORDER — ONDANSETRON 8 MG/1
1 TABLET, FILM COATED ORAL
Qty: 12 | Refills: 0
Start: 2021-06-03

## 2021-06-03 RX ADMIN — FAMOTIDINE 20 MILLIGRAM(S): 10 INJECTION INTRAVENOUS at 08:12

## 2021-06-03 RX ADMIN — METHADONE HYDROCHLORIDE 10 MILLIGRAM(S): 40 TABLET ORAL at 04:55

## 2021-06-03 RX ADMIN — SODIUM CHLORIDE 1000 MILLILITER(S): 9 INJECTION INTRAMUSCULAR; INTRAVENOUS; SUBCUTANEOUS at 07:11

## 2021-06-03 RX ADMIN — ONDANSETRON 4 MILLIGRAM(S): 8 TABLET, FILM COATED ORAL at 04:41

## 2021-06-03 RX ADMIN — SODIUM CHLORIDE 1000 MILLILITER(S): 9 INJECTION INTRAMUSCULAR; INTRAVENOUS; SUBCUTANEOUS at 04:42

## 2021-06-03 RX ADMIN — ONDANSETRON 4 MILLIGRAM(S): 8 TABLET, FILM COATED ORAL at 08:10

## 2021-06-03 NOTE — ED PROVIDER NOTE - NSFOLLOWUPINSTRUCTIONS_ED_ALL_ED_FT
- Continue all regular medications  - For pain, take tylenol or pepcid as directed on the packaging  - Follow up with your primary doctor within 3 days, discuss need for further imaging such as MRI for pancreatic duct dilation.   - Follow up with gastroenterology within 1 month to discuss need for endoscopy for gastroduodenitis, you will be contacted to arrange an appointment  - You were given copies of labs and/or imaging results if applicable, please take them to your follow up appointments  - Return to the ER for constant vomiting, abdominal pain, persistent fever or any worsening symptoms or concerns

## 2021-06-03 NOTE — ED PROVIDER NOTE - OBJECTIVE STATEMENT
70 y/o female with RLS, TKA, HTN, HLD, GERD presenting with vomiting since 7pm. Patient reports 30-40 episodes of non-bloody emesis with no abdominal pain, fevers/chills, diarrhea, cough, rashes, or changes in urination. Patient also reports lightheadedness with no LOC and diffuse weakness. Unable to tolerate PO. Last bowel movement prior to arrival in ER with mild constipation and no bloody. No vaginal bleeding. No hx of abdominal surgery

## 2021-06-03 NOTE — ED PROVIDER NOTE - PATIENT PORTAL LINK FT
You can access the FollowMyHealth Patient Portal offered by [facility] by registering at the following website: [email or web address]. By joining [employer]’s FollowMyHealth portal, you will also be able to view your health information using other applications (apps) compatible with our system.

## 2021-06-03 NOTE — ED ADULT NURSE NOTE - OBJECTIVE STATEMENT
Pt is a 71y F PMH GERD, HTN, OA p/w N/V since 7PM. Pt reports vomiting ~30 times since 7PM. Unable to keep down foods or medications. Reports lightheadedness and weakness starting ~ same time. Denies fevers, chills, cough, diarrhea, hematuria, melena, blood in vomitus. A&Ox4, RIVERS, lungs clear, distal pulses intact, abdomen soft, skin intact. Side rails up for safety, call bell and personal items within reach, instructed to call for assistance, verbalizes understanding. Will continue to monitor.

## 2021-06-03 NOTE — ED PROVIDER NOTE - CLINICAL SUMMARY MEDICAL DECISION MAKING FREE TEXT BOX
72 y/o female with RLS, TKA, HTN, HLD, GERD presenting with vomiting since 7pm with no abdominal pain and last bowel movement prior to arrival. CTAP given age and inability to tolerate PO. Will check lactate, lipase, and urine for low suspicion of mesenteric ischemia vs. pancreatitis. Zofran, 1LNS, and reassess.

## 2021-06-03 NOTE — ED PROVIDER NOTE - PROGRESS NOTE DETAILS
Zachery PGY2: Patient reevaluated and feeling better. Tolerated PO. Reviewed and discussed results with patient. Discussed importance of follow up and return precautions. Patient agrees with plan.

## 2021-06-03 NOTE — ED PROVIDER NOTE - ATTENDING CONTRIBUTION TO CARE
Pt presents w nausea and vomiting x many times, denies abd pain, diarrhea, fever, cp, sob. denies any new meds, or any excessive meds. on exam, pt appears uncomfortable from nausea, but is not actively vomiting, has soft non-distended abd. will get labs, CT a/p, ua, ekg, trop. low concern for acs given no chest symptoms. given no change in meds and no new meds, seems less likely to be polypharmacy.

## 2021-06-03 NOTE — ED PROVIDER NOTE - PHYSICAL EXAMINATION
Gen: WDWN, unwell appearing   HEENT: EOMI, no nasal discharge, mucous membranes dry, no oropharyngeal edema/erythema or exudates   CV: RRR, +S1/S2, no M/R/G  Resp: CTAB, no W/R/R  GI: Abdomen soft non-distended, NTTP, no masses  MSK: No CVA tenderness, no open wounds, no bruising, no LE edema  Neuro: CN2-12 grossly intact, A&Ox4, MS +5/5 in UE and LE BL, gross sensation intact in UE and LE BL  Psych: appropriate mood

## 2021-06-03 NOTE — ED PROVIDER NOTE - NS ED ROS FT
Gen: Denies fever  CV: Denies chest pain, palpitations  Skin: Denies rash, erythema, color changes  Resp: Denies SOB, cough  Endo: Denies sensitivity to heat, cold, increased urination  GI: Denies diarrhea  Msk: Denies back pain, LE swelling, extremity pain  : Denies dysuria, increased frequency  Neuro: Denies LOC, weakness, numbness/tingling

## 2021-06-03 NOTE — ED PROVIDER NOTE - NSFOLLOWUPCLINICS_GEN_ALL_ED_FT
[facility]  Gastroenterology  [address] [place] [address] [address]  [place] [address]  Phone: [phone]  Fax:

## 2021-06-03 NOTE — ED ADULT NURSE REASSESSMENT NOTE - NS ED NURSE REASSESS COMMENT FT1
Report received from RN. Pt received A&Ox3, IV intact and patent, VSS, pt denies pain/discomfort, bed locked and in lowest position, call bell within reach and pt instructed on use, safety and comfort measures maintained. Report received from RN. Pt received A&Ox3, IV intact and patent, VSS, bed locked and in lowest position, call bell within reach and pt instructed on use, safety and comfort measures maintained.

## 2021-06-03 NOTE — ED PROVIDER NOTE - PMH
Depression    Essential hypertension    Fungal infection of skin  under breast  GERD (gastroesophageal reflux disease)    H/O scoliosis    H/O seasonal allergies    History of pulmonary embolism  after billings abigail surgery,1984 treated with coumadin 3 months, no issues after  OA (osteoarthritis)    Osteoporosis    Restless leg syndrome

## 2021-06-04 LAB
CULTURE RESULTS: SIGNIFICANT CHANGE UP
SPECIMEN SOURCE: SIGNIFICANT CHANGE UP

## 2021-06-21 ENCOUNTER — RX RENEWAL (OUTPATIENT)
Age: 71
End: 2021-06-21

## 2021-06-23 ENCOUNTER — TRANSCRIPTION ENCOUNTER (OUTPATIENT)
Age: 71
End: 2021-06-23

## 2021-06-24 ENCOUNTER — APPOINTMENT (OUTPATIENT)
Dept: ORTHOPEDIC SURGERY | Facility: HOSPITAL | Age: 71
End: 2021-06-24

## 2021-06-24 ENCOUNTER — OUTPATIENT (OUTPATIENT)
Dept: OUTPATIENT SERVICES | Facility: HOSPITAL | Age: 71
LOS: 1 days | End: 2021-06-24
Payer: MEDICARE

## 2021-06-24 ENCOUNTER — RX RENEWAL (OUTPATIENT)
Age: 71
End: 2021-06-24

## 2021-06-24 VITALS
HEART RATE: 81 BPM | RESPIRATION RATE: 16 BRPM | SYSTOLIC BLOOD PRESSURE: 150 MMHG | OXYGEN SATURATION: 97 % | DIASTOLIC BLOOD PRESSURE: 69 MMHG

## 2021-06-24 VITALS
RESPIRATION RATE: 16 BRPM | HEART RATE: 70 BPM | SYSTOLIC BLOOD PRESSURE: 124 MMHG | TEMPERATURE: 99 F | DIASTOLIC BLOOD PRESSURE: 65 MMHG | OXYGEN SATURATION: 98 %

## 2021-06-24 DIAGNOSIS — T84.84XA PAIN DUE TO INTERNAL ORTHOPEDIC PROSTHETIC DEVICES, IMPLANTS AND GRAFTS, INITIAL ENCOUNTER: ICD-10-CM

## 2021-06-24 PROCEDURE — 29884 ARTHRS KNEE SURG LYSIS ADS: CPT | Mod: RT

## 2021-06-24 PROCEDURE — 29873 ARTHRS KNEE SURG W/LAT RLS: CPT | Mod: RT,XS

## 2021-06-24 RX ORDER — LIDOCAINE HCL 20 MG/ML
0.2 VIAL (ML) INJECTION ONCE
Refills: 0 | Status: DISCONTINUED | OUTPATIENT
Start: 2021-06-24 | End: 2021-07-08

## 2021-06-24 RX ORDER — HYDROMORPHONE HYDROCHLORIDE 2 MG/ML
0.5 INJECTION INTRAMUSCULAR; INTRAVENOUS; SUBCUTANEOUS
Refills: 0 | Status: DISCONTINUED | OUTPATIENT
Start: 2021-06-24 | End: 2021-06-24

## 2021-06-24 RX ORDER — ONDANSETRON 8 MG/1
4 TABLET, FILM COATED ORAL ONCE
Refills: 0 | Status: DISCONTINUED | OUTPATIENT
Start: 2021-06-24 | End: 2021-07-08

## 2021-06-24 RX ORDER — SODIUM CHLORIDE 9 MG/ML
3 INJECTION INTRAMUSCULAR; INTRAVENOUS; SUBCUTANEOUS EVERY 8 HOURS
Refills: 0 | Status: DISCONTINUED | OUTPATIENT
Start: 2021-06-24 | End: 2021-07-08

## 2021-06-24 RX ORDER — CHLORHEXIDINE GLUCONATE 213 G/1000ML
1 SOLUTION TOPICAL ONCE
Refills: 0 | Status: DISCONTINUED | OUTPATIENT
Start: 2021-06-24 | End: 2021-07-08

## 2021-06-24 RX ORDER — SODIUM CHLORIDE 9 MG/ML
1000 INJECTION, SOLUTION INTRAVENOUS
Refills: 0 | Status: DISCONTINUED | OUTPATIENT
Start: 2021-06-24 | End: 2021-07-08

## 2021-06-24 NOTE — BRIEF OPERATIVE NOTE - NSICDXBRIEFPROCEDURE_GEN_ALL_CORE_FT
PROCEDURES:  Arthroscopic lateral retinacular release of right knee 24-Jun-2021 12:33:03  Alan Whitehead

## 2021-06-24 NOTE — ASU DISCHARGE PLAN (ADULT/PEDIATRIC) - CARE PROVIDER_API CALL
Hank Jensen)  Orthopaedic Surgery  [address], [address]  [place] [address]  Phone: [phone]  Fax: [phone]  Follow Up Time:

## 2021-06-24 NOTE — ASU DISCHARGE PLAN (ADULT/PEDIATRIC) - ASU DC SPECIAL INSTRUCTIONSFT
Knee Arthroscopy Instructions    1) Your knee will swell over the next 48hours and you can expect pain to get a bit worse. Ice your Knee plenty, continuously if possible. Fill up a plastic bag, then wrap it in a towel or pillow case.     2) Elevate your leg above your heart with about 3 pillows when you can, when you are in bed or chair. Otherwise you should be up and about, walking as much as you can tolerate. The more you move the better you will do. Weight bearing as tolerated.    3) BANDAGE: Expect some mild bloody drainage. It is normal. It may soak through the gauze and ACE bandage. This is mostly leftover Saline fluid coming out of your knee from surgery. Just place another Gauze and another ACE over it and wrap it snug but not overly tight. If it bleeds through the second bandage again, call.    4) SHOWER: Remove bandage in 48hrs and place waterproof band aides. You can shower in 48 hours. No bath. Pat your incisions dry. No creams, no lotions.    5) Only reason to worry would be if pain got so severe that you cannot feel or wiggle your toes, or if your foot is cold. In this case you need to call or come to the ER. But as long as you can feel and wiggle your toes, you are fine.    6) Call the office to schedule a follow up appointment to be seen in 10-14 days.     7) A pain Rx is in the chart; fill it on your way home. OR was sent electronically to your pharmacy, pick it up on the way home. Knee Arthroscopy Instructions    1) Your knee will swell over the next 48hours and you can expect pain to get a bit worse. Ice your Knee plenty, continuously if possible. Fill up a plastic bag, then wrap it in a towel or pillow case.     2) Elevate your leg above your heart with about 3 pillows when you can, when you are in bed or chair. Otherwise you should be up and about, walking as much as you can tolerate. The more you move the better you will do. Weight bearing as tolerated.    3) BANDAGE: Expect some mild bloody drainage. It is normal. It may soak through the gauze and ACE bandage. This is mostly leftover Saline fluid coming out of your knee from surgery. Just place another Gauze and another ACE over it and wrap it snug but not overly tight. If it bleeds through the second bandage again, call. Do not remove dressing for 5 days    4) SHOWER: Remove bandage after 5 days, and place waterproof band aides.  No baths but you may shower. Pat your incisions dry. No creams, no lotions.    5) Only reason to worry would be if pain got so severe that you cannot feel or wiggle your toes, or if your foot is cold. In this case you need to call or come to the ER. But as long as you can feel and wiggle your toes, you are fine.    6) Call the office to schedule a follow up appointment to be seen in 10-14 days.     7) A pain Rx is in the chart; fill it on your way home. OR was sent electronically to your pharmacy, pick it up on the way home.

## 2021-06-24 NOTE — ASU DISCHARGE PLAN (ADULT/PEDIATRIC) - NURSING INSTRUCTIONS
You were given Tylenol during your visit, the next dose of Tylenol will be on or after _____5:30 PM______ ,today/tonight and every 6 hours afterwards for pain management, do not take any Tylenol containing products until this time. Do not exceed more than 4000mg of Tylenol in one 24 hour setting. If no contraindications, you may alternate with Ibuprofen or Naproxen 3 hours after dose of Tylenol. Ibuprofen can be taken every 6 hours. Naproxen may be taken every 12 hours.

## 2021-07-06 ENCOUNTER — APPOINTMENT (OUTPATIENT)
Dept: ORTHOPEDIC SURGERY | Facility: CLINIC | Age: 71
End: 2021-07-06
Payer: MEDICARE

## 2021-07-06 PROCEDURE — 73564 X-RAY EXAM KNEE 4 OR MORE: CPT | Mod: RT

## 2021-07-06 PROCEDURE — 99024 POSTOP FOLLOW-UP VISIT: CPT

## 2021-07-06 NOTE — CONSULT LETTER
[Dear  ___] : Dear  [unfilled], [Please see my note below.] : Please see my note below. [FreeTextEntry1] : I had the pleasure of evaluating your patient in the office today for complaints of right knee pain secondary to patellofemoral pain due to flexion contracture. I have enclosed a copy of today's office notes for your charts and for your review. The patient would like to undergo arthroscopic surgery of her right knee to evaluate the cause of her pain and a possible lateral retinacular release. She will require full medical clearance prior to undergoing surgery.\par \par Sincerely, \par \par Hank Jensen M.D.\par Professor and \par Department of Orthopedic Surgery\par [facility] [place]\par

## 2021-07-06 NOTE — REASON FOR VISIT
[Follow-Up Visit] : a follow-up visit for [FreeTextEntry2] : POSTOP s/p 6/24/2021 s/p right TKR with arthroscopic adhesiolysis.

## 2021-07-06 NOTE — PHYSICAL EXAM
[LE] : Sensory: Intact in bilateral lower extremities [Knee] : patellar 2+ and symmetric bilaterally [PT] : posterior tibial 2+ and symmetric bilaterally [de-identified] : 72 y/o pleasant  Female in NAD and AAOx3. 31.9 BMI. The pt has a mildly antalgic gait. Physical examination of of the right knee shows a well healed anteromedial portal, erythematous anterolateral portal, otherweise healed surgical scars. ROM of the right knee reveals 0-110° with mild pain albeit significantly improved. Strength is 5/5 within this arc of motion. There is minimal pain on palpation to the medial/lateral joint line. There is ttp on the lateral patella and retinaculum greater than the medial side. There are no neurological deficits. [de-identified] : X-rays were ordered, obtained interpreted by me today ([facility] 7/6/21): 4 views of the right knee demonstrate TKA in satisfactory alignment with no acute fx or dx..\par

## 2021-07-06 NOTE — HISTORY OF PRESENT ILLNESS
[de-identified] : 70 y/o female with history of right TSR about 10 years ago and right TKR in 11/4/2020 by DR. Catalino Mendoza s/p right knee arthroscopic adhesiolysis presents for follow up. She states the pain is a 6/10 at this visit, which she takes codeine.

## 2021-07-06 NOTE — DISCUSSION/SUMMARY
[Surgical risks reviewed] : Surgical risks reviewed [de-identified] : 70 y/o female POD 12 from arthroscopic lysis of adhesions with a lateral release. Bandaid applied to the anterolateral portal and patient educated not to touch this area. Patient will continue with physical therapy for aggressive ROM. Patient has been cleared to drive. Patient will follow up with me in 6 weeks. The patient expressed understanding and all questions were answered.\par \par

## 2021-07-07 ENCOUNTER — APPOINTMENT (OUTPATIENT)
Dept: ENDOCRINOLOGY | Facility: CLINIC | Age: 71
End: 2021-07-07
Payer: MEDICARE

## 2021-07-07 PROCEDURE — 96372 THER/PROPH/DIAG INJ SC/IM: CPT

## 2021-07-07 RX ADMIN — DENOSUMAB 0 MG/ML: 60 INJECTION SUBCUTANEOUS at 00:00

## 2021-07-08 LAB
25(OH)D3 SERPL-MCNC: 26.6 NG/ML
ALBUMIN SERPL ELPH-MCNC: 4.5 G/DL
ALP BLD-CCNC: 130 U/L
ALT SERPL-CCNC: 10 U/L
ANION GAP SERPL CALC-SCNC: 15 MMOL/L
AST SERPL-CCNC: 18 U/L
BILIRUB SERPL-MCNC: 0.4 MG/DL
BUN SERPL-MCNC: 25 MG/DL
CALCIUM SERPL-MCNC: 9.9 MG/DL
CHLORIDE SERPL-SCNC: 100 MMOL/L
CO2 SERPL-SCNC: 26 MMOL/L
CREAT SERPL-MCNC: 1 MG/DL
GLUCOSE SERPL-MCNC: 107 MG/DL
POTASSIUM SERPL-SCNC: 4.1 MMOL/L
PROT SERPL-MCNC: 6.8 G/DL
SODIUM SERPL-SCNC: 142 MMOL/L

## 2021-07-08 RX ORDER — DENOSUMAB 60 MG/ML
60 INJECTION SUBCUTANEOUS
Qty: 1 | Refills: 0 | Status: COMPLETED | OUTPATIENT
Start: 2021-07-07

## 2021-07-13 ENCOUNTER — APPOINTMENT (OUTPATIENT)
Dept: ENDOCRINOLOGY | Facility: CLINIC | Age: 71
End: 2021-07-13

## 2021-07-28 ENCOUNTER — APPOINTMENT (OUTPATIENT)
Dept: NEUROLOGY | Facility: CLINIC | Age: 71
End: 2021-07-28

## 2021-08-10 ENCOUNTER — APPOINTMENT (OUTPATIENT)
Dept: ENDOCRINOLOGY | Facility: CLINIC | Age: 71
End: 2021-08-10
Payer: MEDICARE

## 2021-08-10 VITALS
BODY MASS INDEX: 31.85 KG/M2 | WEIGHT: 158 LBS | SYSTOLIC BLOOD PRESSURE: 127 MMHG | OXYGEN SATURATION: 96 % | HEART RATE: 95 BPM | DIASTOLIC BLOOD PRESSURE: 70 MMHG | HEIGHT: 59 IN

## 2021-08-10 PROCEDURE — 99215 OFFICE O/P EST HI 40 MIN: CPT

## 2021-08-10 NOTE — PHYSICAL EXAM
[Alert] : alert [Well Nourished] : well nourished [Healthy Appearance] : healthy appearance [No Acute Distress] : no acute distress [Normal Voice/Communication] : normal voice communication [No Proptosis] : no proptosis [No Respiratory Distress] : no respiratory distress [No Accessory Muscle Use] : no accessory muscle use [Normal Rate and Effort] : normal respiratory rate and effort [No Stigmata of Cushings Syndrome] : no stigmata of Cushings Syndrome [No Involuntary Movements] : no involuntary movements were seen [Oriented x3] : oriented to person, place, and time [Normal Affect] : the affect was normal [Normal Mood] : the mood was normal [EOMI] : extra ocular movement intact [Supple] : the neck was supple [Clear to Auscultation] : lungs were clear to auscultation bilaterally [Normal S1, S2] : normal S1 and S2 [Normal Rate] : heart rate was normal [Regular Rhythm] : with a regular rhythm [No Edema] : no peripheral edema [Normal Bowel Sounds] : normal bowel sounds [Not Tender] : non-tender [Not Distended] : not distended [Soft] : abdomen soft [Kyphosis] : kyphosis present [de-identified] : +nodular thyroid gland

## 2021-08-10 NOTE — HISTORY OF PRESENT ILLNESS
[Prolia (Denosumab)] : Prolia (Denosumab) [Premat. Menopause (surg)] : premature menopause which occurred surgically [High Fall Risk] : high fall risk [Frequent Falls] : frequent falls [Uses a Walker/Cane] : use of a walker/cane [Regular Dental Follow-Up] : regular dental follow-up [History of Blood Clots] : history of blood clots [FreeTextEntry1] : 71-year-old F with PMH osteoporosis, thyroid nodules, prediabetes presents for follow up.\par \par Osteoporosis: Seen in 2013 for osteoporosis. Told of osteopenia approximately in 2005. Was treated from [id number] with Fosamax and then stopped due to esophagitis. DXA 2013 stable - drug holiday continued. No history of fractures. Mother did not have hip fx. Patient smoked 1-2 PPD x 20 yrs - quit 1982. No prolonged steroid use. No recent steroids. Premature menopause at age 33 s/p RAJIV for fibroids. She has a history of several orthopedic procedures including spinal fusion 1966 and multiple further spinal surgeries. History of left hip replacement 2010 and right shoulder replacement 2012 for arthritis.  Taking Vit D 2000 IU QD. Now taking calcium. Seen initially by me 5/2/16 with repeat DXA with significant decline in BMD of 1/3 radius from -2.4 in 2013 to -3.4. PTH not elevated, but parathyroid adenoma possible visualized on thyroid U/S. Recommended prolia s/p 1st injection 5/24/16, 2nd injection 11/2016. 3rd injection 6/2017. 4th injection 12/18/17. 5th injection 7/2/18. 6th injection 1/2019. 7th injection 9/2019. Was due for 8th injection 3/2020, but postponed due to COVID. 8th injection given 1/2021.  9th injection 7/2021. No recent fractures, but has fallen since last visit. Follows with dental. No upcoming dental work. Has hx of dental implants. Last DXA 6/2017 total spine -2.1, 1/3 radius -3.1, Hip -2.1. Last DXA 1/2021 stable.\par \par B/L Thyroid Nodules: Outpatient Thyroid US performed 3/2016 revealed B/L thyroid nodules, with heterogenous, hypoechoic solid nodule of the L midpole measuring 1.5 x 1.1 x 0.8cm;  heterogenous, hypoechoic solid nodule of the R mid-lower pole measuring 1.3 x 0.8 x 1.1 cm; and  heterogenous, hypoechoic solid nodule of the RLL measuring 1.3 x 1.4 x 1.0 cm.  Nodules were found incidentally on work up for falls. No personal h/o radiation to the head or neck. No h/o abnormal thyroid blood work. No voice changes. + dysphagia after paraesophageal hernia repair. Father had thyroid nodule - unknown if malignant. Seen initially 5/2016 with thyroid U/S confirming dominant left thyroid nodule. Recommended FNA performed 5/2016 with benign results. Repeat thyroid US 1/2019 stable. Patient reports occasional hand tremors. Denies palpitations. Denies heat or cold intolerance. +weight loss and constipation from methadone. No h/o of amiodarone or lithium use. No changes to the neck. \par \par On chronic methadone for restless leg syndrome now started ropinirole.  [Taking Steroids] : no past or present history of taking steroids [Kidney Stones] : no history of kidney stones [Excessive Alcohol Intake] : no past or present history of excessive alcohol intake [Current Smoking___(ppd)] : not currently smoking [Family History of Osteoporosis] : no family history of osteoporosis [Family History of Breast Cancer] : no family history of breast cancer [Family History of Hip Fracture] : no family history of hip fracture [Hyperparathyroidism] : no hyperparathyroidism [History of Radiation Therapy] : no history of radiation therapy [Previous Fragility Fracture] : no previous fragility fracture

## 2021-08-10 NOTE — RESULTS/DATA
[Hologic] : hologic [T-Score ___] : T-score: [unfilled] [FreeTextEntry2] : 5/2/16 [FreeTextEntry1] : 6/5/17: Total Hip -2.1; Neck -2.0, 1/3 radius -3.1\par 1/2021: Total Hip -1.8; Neck -1.0; 1/3 radius -2.9

## 2021-08-10 NOTE — DATA REVIEWED
[FreeTextEntry1] : Focused Thyroid US 8/10/2021: Right Upper Nodule 1.42 x 1.21 x 0.78; Right Lower Nodule 0.87 x 0.83 x 0.86; Right Parathyroid 1.16 x 0.73 x 0.84; Left Nodule 1.28 x 0.77 x 1.05 with coarse calcification. Additional b/l subcentimeter cysts and nodules too numerous to characterize. \par \par Labs 7/2021:\par CMP normal except glucose of 107\par Vit D 26.6\par \par Labs 10/2019:\par CBC normal\par \par HDL 63\par Chol 242\par Trig 188\par CMP normal\par TSH 1.67\par A1c 5.7%\par \par Labs 3/2019:\par \par \par Thyroid Ultrasound Report 1/7/19:\par \par Technique: multiple real time longitudinal and transverse images were obtained using a high resolution ultrasound with a linear transducer, [employer] e 2008 model, 10-12 MHz frequencies. All measurements will be reported as longitudinal x sal-posterior x transverse. \par Comparison: Report dated 5/2016. \par \par Indication: thyroid nodule. \par \par Findings: \par The right thyroid lobe measures 3.66 x 1.33 x 1.64 cm. The left thyroid lobe measures 4.12 x 1.21 x 1.68 cm. The isthmus measures 0.25 cm. \par The right thyroid lobe has a heterogeneous parenchyma. \par The left thyroid lobe has a heterogeneous parenchyma . \par  \par In the right mid pole there is a  mixed, heterogenous nodule. It measures 1.33 x 0.77 x 1.17 cm. The nodule is ovoid in shape and the border is distinct. It has peripheral vascularity. \par \par In the right lower pole there is a  mixed. It measures 0.95 x 0.81 x 0.96 cm. The nodule is ovoid in shape and the border is distinct. It has peripheral vascularity. \par \par In the left mid pole there is a  mixed, heterogenous nodule. It measures 1.11 x 1.03 x 0.94 cm. The nodule is ovoid in shape and the border is distinct. It has peripheral and scant internal vascularity. \par \par In the left mid pole there is a  hypoechoic nodule. It measures 0.65 x 0.43 x 0.87 cm. The nodule is ovoid in shape and the border is smooth. The nodule does not have a halo. It demonstrates no calcifications. It has no vascularity. parathyroid adenoma posterior to R lobe 1.18 x 1.08 x 0.96 cm. \par \par In the right upper pole there is a  cyst. It measures 0.75 x 0.56 x 0.68 cm. The nodule is round in shape and the border is smooth. The nodule does not have a halo. It demonstrates no calcifications. It has no vascularity. \par \par Additional bilateral subcentimeter cysts and nodules too numerous to characterize. \par \par \par Labs 9/2018:\par CBC normal\par CMP normal\par A1c 5.8%\par Ca 10\par Corrected Ca 9.5\par \par HDL 61\par Choll 232\par Trig 98\par Vit D 43.2\par Mg 1.9\par \par Labs 7/2018:\par CBC normal\par A1c 5.7%\par CMP normal\par Ca 10\par Corrected 9.6\par PTH 46\par TSH 2.26\par Free T4 1.4\par Phos 2.8\par Vit D 39.8\par Vit D 1,25 37.3\par \par Labs 12/5/17:\par CBC normal\par Mg 1.9\par Glucose 112\par Ca 10\par PTH 36\par CMP otherwise normal\par Phos 3.5\par Chol 228\par Trig 243\par HDL 59\par \par TSH 1.67\par Free T4 1.2\par A1c 5.6%\par Vit D 30.9\par \par \par \par Labs 4/2017:\par Glucose 107\par \par HDL 76\par Chol 245\par Trig 205\par Vit D 31.8\par A1c 6.1%\par \par Labs 11/2016:\par TSH 1.26\par Free T4 1.3\par CBC normal\par A1c 6.0%\par CMP normal except glucose of 115\par \par \par FNA 5/31/16: Benign\par \par Labs 5/2/16:\par PTH 33\par Ca 10.2\par TSH 1.36\par Free T4 1.3\par \par Thyroid US performed 5/2/16:\par \par Indication: thyroid nodule. \par \par Findings: \par The right thyroid lobe measures 3.63 x 1.12 x 1.71 cm. The left thyroid lobe measures 3.73x 1.22 x 1.72 cm. The isthmus measures 0.29 cm. \par  \par In the right lower pole there is a  mixed, heterogenous nodule. It measures 1.29 x 0.71 x 1.1 cm. The nodule is ovoid in shape and the border is distinct. It has peripheral vascularity. \par \par In the right lower pole there is a  mixed. It measures 0.83 0.52 x 0.70 cm. The nodule is ovoid in shape and the border is distinct. It has peripheral vascularity. \par \par In the left lower pole there is a  mixed, heterogenous nodule. It measures 1.38 x 1.02 x 0.82 cm. The nodule is ovoid in shape and the border is distinct. It has peripheral and scant internal vascularity. \par \par parathyroid adenoma posterior to R lobe 1.18 x 1.08 x 0.96 cm. \par \par DEXA performed May 2,2016\par spine not performed due to surgery\par Tot Hip -2.2 (+4.9% change from 2013)\par Fem Neck -2.3 Z(-5.2 % change from 2013)\par Proximal radius -3.4 (-11.3% change from 2013)\par \par DEXA performed September 10, 2013\par spine not performed due to surgery\par Total Hipt -2.5, osteoporosis, stable versus prior study of 2011 (-2.7)\par femoral neck -2.0, osteopenia, stable versus 2011 (-2.3)\par Proximal radius -2.4, osteopenia, no prior

## 2021-08-10 NOTE — REVIEW OF SYSTEMS
[Joint Pain] : joint pain [All other systems negative] : All other systems negative [Fatigue] : no fatigue [Recent Weight Gain (___ Lbs)] : no recent weight gain [Recent Weight Loss (___ Lbs)] : no recent weight loss [Visual Field Defect] : no visual field defect [Dysphagia] : no dysphagia [Neck Pain] : no neck pain [Dysphonia] : no dysphonia [Chest Pain] : no chest pain [Shortness Of Breath] : no shortness of breath [Nausea] : no nausea [Constipation] : no constipation [Vomiting] : no vomiting [Diarrhea] : no diarrhea [Polyuria] : no polyuria [Polydipsia] : no polydipsia [FreeTextEntry8] : Menopause

## 2021-08-10 NOTE — ASSESSMENT
[Carbohydrate Consistent Diet] : carbohydrate consistent diet [Importance of Diet and Exercise] : importance of diet and exercise to improve glycemic control, achieve weight loss and improve cardiovascular health [FreeTextEntry1] : 71-year-old F w/  \par \par 1. B/L Thyroid nodules - Thyroid FNA of Left nodule (please refer to official US report) benign. Repeat focused thyroid U/S performed today stable compared to 1/2019 and stable compared to 2016 (see results section for full description). Clinically euthyroid.   \par \par 2. Osteoporosis -  DXA repeated 6/2017 with results as noted above with stable/mild improvement in proximal radius T score of -3.1 from -3.4 (-11.3% change from 2013). s/p drug holiday for ~ 7 years. Started on prolia 1st dose 5/24/16. 2nd dose 11/29/16. 3rd Prolia 6/2017. 4th dose 12/2017. 5th dose 7/2018. 6th dose 1/2019. 7th dose of Prolia given 9/2019 (BUY AND BILL). 8th injection 1/2021. 9th injection 7/2021.Last DEXA 1/2021 generally stable with improvement in the femoral neck. Next DXA to be done 1/2023.\par \par 3. Prediabetes- A1c 5.7%. Lifestyle modification advised.  \par \par 4. HTN- c/w chlorthalidone and losartan\par \par Prep time with review of labs and interval progress notes and consultations 5 min\par Discussion with patient regarding thyroid nodules and osteoporosis treatment options and goals of care answering all patients questions and addressing all concerns 20 min\par Focused thyroid US performed at today's visit with multiple nodules assessed and results discussed with the patient at the time of the visit 15 min\par Post-visit completion charting and review 5 min\par Total Time 45 min\par \par Specifically causes, evaluation, treatment options, risks, complications, and benefits of available therapies were discussed. Questions were answered.\par \par The submitted E/M billing level for this visit reflects the total time spent on the day of the visit including face-to-face time spent with the patient, non-face-to-face review of medical records and relevant information, documentation, and asynchronous communication with the patient after a visit via phone, email, or patient’s EHR portal after the visit. \par The medical records reviewed are either scanned into the chart or reviewed with the patient using a patient’s electronic medical records portal for patients with records not available to [facility] via electronic transmission platforms from other institutions and labs. \par Time spend counseling and performing coordination of care was also included in determining the appropriate EM billing level.\par \par I have reviewed and verified information regarding the chief complaint and history recorded by the ancillary staff and/or the patient. I have independently reviewed and interpreted tests performed by other physicians and facilities as necessary. \par \par I have discussed with the patient differential diagnosis, reason for auxiliary tests if ordered, risks, benefits, alternatives, and complications of each form of therapy were discussed. \par \par \par

## 2021-08-10 NOTE — PROCEDURE
[[employer] e 2008 model, 10-12 MHz frequencies] : multiple real time longitudinal and transverse images were obtained using a high resolution ultrasound with a linear transducer, [employer] 3[employer] e 2008 model, 10-12 MHz frequencies. All measurements will be reported as longitudinal x sal-posterior x transverse. [Report dated ___] : Report dated [unfilled] [Thyroid Nodule] : thyroid nodule [] : a heterogeneous parenchyma  [Lower] : lower pole there is a  [Peripheral vascularity] : peripheral vascularity [Mixed] : mixed [Heterogeneous] : heterogenous nodule [Distinct] : distinct [Peripheral and scant  internal vascularity] : peripheral and scant internal vascularity [Left Thyroid] : left [Mid] : mid pole there is a  [Hypoechoic] : hypoechoic nodule [Ovoid] : ovoid in shape [Right Thyroid] : right [Upper] : upper pole there is a  [Cyst] : cyst [Round] : round in shape [Smooth] : smooth [No] : does not have a halo [No calcifications] : no calcifications [No vascularity] : no vascularity [FreeTextEntry1] : 3.66 x 1.33 x 1.64 [FreeTextEntry5] : 4.12 x 1.21 x 1.68 [FreeTextEntry2] : 0.25 [FreeTextEntry4] : parathyroid adenoma posterior to R lobe 1.18 x 1.08 x 0.96 cm [FreeTextEntry3] : 0.75 x 0.56 x 0.68 [de-identified] : Additional bilateral subcentimeter cysts and nodules too numerous to characterize.

## 2021-08-23 ENCOUNTER — RX RENEWAL (OUTPATIENT)
Age: 71
End: 2021-08-23

## 2021-09-03 ENCOUNTER — APPOINTMENT (OUTPATIENT)
Dept: ORTHOPEDIC SURGERY | Facility: CLINIC | Age: 71
End: 2021-09-03
Payer: MEDICARE

## 2021-09-03 PROCEDURE — 73564 X-RAY EXAM KNEE 4 OR MORE: CPT | Mod: 26,LT

## 2021-09-03 PROCEDURE — 99213 OFFICE O/P EST LOW 20 MIN: CPT | Mod: 24

## 2021-09-03 NOTE — CONSULT LETTER
[Dear  ___] : Dear  [unfilled], [Please see my note below.] : Please see my note below. [FreeTextEntry1] : I had the pleasure of evaluating your patient in the office today for complaints of left knee pain secondary osteoarthritis. I have enclosed a copy of today's office notes for your charts and for your review.\par \par Sincerely, \par \par Van Rowan MS PA-C\par [facility] [place] [facility]\par Department of Orthopedic Surgery\par [facility] [place]

## 2021-09-03 NOTE — DISCUSSION/SUMMARY
[de-identified] : 70 y/o female with left knee pain secondary to osteoarthritis with exacerbation due to a fall.   A lengthy discussion was held regarding the patients condition and treatment options including all risks, benefits, prognosis and outcomes of each were discussed in detail. The patient would like to continue with conservative management at this time. Non-operative treatment was advised and a knee treatment handout was given and reviewed with the patient. The patient will contact me if there are any concerns.  Follow up will be prn. The patient expressed understanding and all questions were answered. \par

## 2021-09-03 NOTE — HISTORY OF PRESENT ILLNESS
[de-identified] : 70 y/o female with history of right TSR about 10 years ago and right TKR in 11/4/2020 by DR. Catalino Mendoza with Right knee arthroscopic adhesiolysis by Dr. Jensen on 6/2021 presents with left knee pain due to a fall 6 weeks ago. She states that tripped and fell onto the left knee. Her symptoms have not improved and is here today for an evaluation. She states that walking aggravates her symptoms. She states the pain is a 5/10 when she is walking. She has no at rest. She is not taking any pain Meds. She denies any numbness or tingling.

## 2021-09-03 NOTE — PHYSICAL EXAM
[LE] : Sensory: Intact in bilateral lower extremities [Knee] : patellar 2+ and symmetric bilaterally [de-identified] : 70 y/o pleasant  Female in NAD and AAOx3. 31.9 BMI. The pt has a mildly antalgic gait. Physical examination of both knees reveals normal contours, healed surgical scar with no signs of infection, no erythema, no swelling, no effusion, no distal lymphedema or phlebitis, no patholaxity. There is a fading/healing skin ecchymosis anterior medial to the patella.  ROM of the left knee reveals 0-125° with pain at end ROM. Strength is 5/5 within this arc of motion. There is minimal pain on palpation to the medial/anterior joint line. +Tibiofemoral crepitus. There are no neurological deficits. [de-identified] : X-rays were ordered, obtained, and interpreted by me today. 4 views of the left knee reveals tricompartment degenerative changes with spurring and no acute fx or dislocation.

## 2021-09-24 ENCOUNTER — APPOINTMENT (OUTPATIENT)
Dept: ORTHOPEDIC SURGERY | Facility: CLINIC | Age: 71
End: 2021-09-24
Payer: MEDICARE

## 2021-09-24 VITALS — HEIGHT: 59 IN | BODY MASS INDEX: 31.85 KG/M2 | WEIGHT: 158 LBS

## 2021-09-24 PROCEDURE — 99213 OFFICE O/P EST LOW 20 MIN: CPT

## 2021-09-24 NOTE — HISTORY OF PRESENT ILLNESS
[de-identified] : 70 y/o female with history of right TSR about 10 years ago and right TKR in 11/4/2020 by DR. Catalino Mendoza with Right knee arthroscopic adhesiolysis by Dr. Jensen on 6/2021 is overall doing well, but continued to having knee pain symptoms. She is doing PT on her own. She ambulates with her rolling walker.

## 2021-09-24 NOTE — PHYSICAL EXAM
[LE] : Sensory: Intact in bilateral lower extremities [Knee] : patellar 2+ and symmetric bilaterally [de-identified] : X-rays were ordered, obtained, and interpreted by me today. 4 views of the left knee reveals tricompartment degenerative changes with spurring and no acute fx or dislocation.  [de-identified] : 70 y/o pleasant  Female in NAD and AAOx3. 31.9 BMI. The pt has a mildly antalgic gait. Physical examination of both knees reveals normal contours, healed surgical scar with no signs of infection, no erythema, no swelling, no effusion, no distal lymphedema or phlebitis, no patholaxity. There is a fading/healing skin ecchymosis anterior medial to the patella.  ROM of the right knee reveals 0-125° with pain at end ROM. Strength is 5/5 within this arc of motion. There is minimal pain on palpation to the medial/anterior joint line. +Tibiofemoral crepitus. There are no neurological deficits.

## 2021-09-24 NOTE — DISCUSSION/SUMMARY
[de-identified] : 72 y/o female s/p Right knee arthroscopic adhesiolysis on 6/2021 is overall doing well, but continued to having knee pain symptoms   A lengthy discussion was held regarding the patients condition and treatment options including all risks, benefits, prognosis and outcomes of each were discussed in detail. I discussed with here that there are subsets of of patients that have nothing mechanically or anatomically wrong but still continue to have pain. The patient states this pain is something that she knows she can live with, but is inquiring about nerve ablation and Iovera was discussed with her.  The patient will do further look up into Iovera. The patient would like to continue with conservative management at this time.  The patient will contact me if there are any concerns.  Follow up will be prn. The patient expressed understanding and all questions were answered. \par \par

## 2021-09-24 NOTE — CONSULT LETTER
[Dear  ___] : Dear  [unfilled], [Please see my note below.] : Please see my note below. [FreeTextEntry1] : I had the pleasure of evaluating your patient in the office today for complaints of right  knee pain s/p right knee arthroscopic adhesiolysis. I have enclosed a copy of today's office notes for your charts and for your review.\par \par Sincerely, \par \par Hank Jensen M.D.\par Professor and \par Department of Orthopedic Surgery\par [facility] [place]

## 2021-10-13 ENCOUNTER — APPOINTMENT (OUTPATIENT)
Dept: PSYCHIATRY | Facility: CLINIC | Age: 71
End: 2021-10-13
Payer: MEDICARE

## 2021-10-13 PROCEDURE — 90791 PSYCH DIAGNOSTIC EVALUATION: CPT

## 2021-10-14 ENCOUNTER — APPOINTMENT (OUTPATIENT)
Dept: PSYCHIATRY | Facility: CLINIC | Age: 71
End: 2021-10-14
Payer: MEDICARE

## 2021-10-14 PROCEDURE — ZZZZZ: CPT

## 2021-10-18 ENCOUNTER — APPOINTMENT (OUTPATIENT)
Dept: GASTROENTEROLOGY | Facility: CLINIC | Age: 71
End: 2021-10-18
Payer: MEDICARE

## 2021-10-18 VITALS
OXYGEN SATURATION: 98 % | HEIGHT: 60 IN | WEIGHT: 160 LBS | RESPIRATION RATE: 16 BRPM | SYSTOLIC BLOOD PRESSURE: 120 MMHG | HEART RATE: 98 BPM | BODY MASS INDEX: 31.41 KG/M2 | DIASTOLIC BLOOD PRESSURE: 80 MMHG | TEMPERATURE: 98 F

## 2021-10-18 DIAGNOSIS — K76.89 OTHER SPECIFIED DISEASES OF LIVER: ICD-10-CM

## 2021-10-18 DIAGNOSIS — K58.2 MIXED IRRITABLE BOWEL SYNDROME: ICD-10-CM

## 2021-10-18 DIAGNOSIS — K59.09 OTHER CONSTIPATION: ICD-10-CM

## 2021-10-18 DIAGNOSIS — K83.8 OTHER SPECIFIED DISEASES OF BILIARY TRACT: ICD-10-CM

## 2021-10-18 PROCEDURE — 99204 OFFICE O/P NEW MOD 45 MIN: CPT

## 2021-10-18 NOTE — HISTORY OF PRESENT ILLNESS
[FreeTextEntry1] : This is a 71-year-old female with history of osteoporosis, chronic GERD and chronic constipation presenting for evaluation. She was in the emergency room several months ago with nausea and vomiting. CT abdomen pelvis performed Toya 3 showing a large lobulated cyst in the right hepatic lobe, mildly prominent common bile duct measuring 1 cm without CBD stone, diverticulosis without diverticulitis, small hiatal hernia, wall thickening of the stomach as well as the proximal duodenum. She was diagnosed with possible gastritis duodenitis. She no longer has nausea vomiting. However she reports continued heartburn symptoms occurring more than twice a week for which she takes Tums as needed. She reports occasional dysphagia. She has a history of paraesophageal hernia repair many years ago. Last upper endoscopy in 2013 with mild esophagitis and gastritis. Colonoscopy December 2013 diverticulosis otherwise normal exam. She denies family history of colon cancer or colon polyps. She denies changes in bowel habits. However she has chronic constipation. She also has occasional abdominal cramping followed by diarrhea. However she is mostly constipated and that she can go for 3 days, sometimes 17 days without having bowel movements. She denies associated rectal bleeding or melena. She denies weight loss. Reviewed recent blood work showing no evidence of anemia.

## 2021-10-18 NOTE — ASSESSMENT
[FreeTextEntry1] : This is a 71-year-old female with history of paraesophageal hernia repair, chronic GERD, CT abdomen pelvis in June showing thickening of the stomach and duodenum which most likely was a gastroduodenitis. However given her history of chronic GERD, I recommend an upper endoscopy. I explained to her the risks, alternatives and benefits of the procedure. Risk including but not limited to bleeding, perforation, infection adverse medication reaction. Questions were answered. She stated understanding. CT abdomen pelvis also showing a large liver cyst and a dilated common bile duct. I recommend MR with MRCP. For the chronic constipation, I recommend increasing her fiber intake and water intake. I recommend Linzess starting at 145 mcg to be taken half hour before breakfast. I informed her possible side effect effect of Linzess including but not limited to diarrhea. She is to call me if she has diarrhea within 2 weeks of starting Linzess at which time I will lower the dose. She is to call me if no improvement in her bowel movements after 2 weeks on the Linzess at which time I will increase the dose. She has no family history of colon cancer or colon polyps. Colonoscopy from 2013 with diverticulosis otherwise unremarkable exam. She is not yet due for screening colonoscopy.

## 2021-10-26 ENCOUNTER — APPOINTMENT (OUTPATIENT)
Dept: DISASTER EMERGENCY | Facility: CLINIC | Age: 71
End: 2021-10-26

## 2021-10-27 LAB — SARS-COV-2 N GENE NPH QL NAA+PROBE: NOT DETECTED

## 2021-10-29 ENCOUNTER — APPOINTMENT (OUTPATIENT)
Dept: MRI IMAGING | Facility: CLINIC | Age: 71
End: 2021-10-29
Payer: MEDICARE

## 2021-10-29 ENCOUNTER — OUTPATIENT (OUTPATIENT)
Dept: OUTPATIENT SERVICES | Facility: HOSPITAL | Age: 71
LOS: 1 days | End: 2021-10-29

## 2021-10-29 DIAGNOSIS — Z00.00 ENCOUNTER FOR GENERAL ADULT MEDICAL EXAMINATION WITHOUT ABNORMAL FINDINGS: ICD-10-CM

## 2021-10-29 PROCEDURE — 70553 MRI BRAIN STEM W/O & W/DYE: CPT | Mod: 26,MH

## 2021-11-03 RX ORDER — LINACLOTIDE 145 UG/1
145 CAPSULE, GELATIN COATED ORAL
Qty: 30 | Refills: 5 | Status: DISCONTINUED | COMMUNITY
Start: 2021-10-18 | End: 2021-11-03

## 2021-11-04 ENCOUNTER — NON-APPOINTMENT (OUTPATIENT)
Age: 71
End: 2021-11-04

## 2021-11-10 ENCOUNTER — APPOINTMENT (OUTPATIENT)
Dept: PSYCHIATRY | Facility: CLINIC | Age: 71
End: 2021-11-10
Payer: MEDICARE

## 2021-11-10 DIAGNOSIS — F09 UNSPECIFIED MENTAL DISORDER DUE TO KNOWN PHYSIOLOGICAL CONDITION: ICD-10-CM

## 2021-11-10 DIAGNOSIS — F43.21 ADJUSTMENT DISORDER WITH DEPRESSED MOOD: ICD-10-CM

## 2021-11-10 PROCEDURE — 96136 PSYCL/NRPSYC TST PHY/QHP 1ST: CPT

## 2021-11-10 PROCEDURE — 96133 NRPSYC TST EVAL PHYS/QHP EA: CPT

## 2021-11-10 PROCEDURE — 96137 PSYCL/NRPSYC TST PHY/QHP EA: CPT

## 2021-11-10 PROCEDURE — 96132 NRPSYC TST EVAL PHYS/QHP 1ST: CPT

## 2021-11-11 ENCOUNTER — NON-APPOINTMENT (OUTPATIENT)
Age: 71
End: 2021-11-11

## 2021-11-15 ENCOUNTER — APPOINTMENT (OUTPATIENT)
Dept: DISASTER EMERGENCY | Facility: CLINIC | Age: 71
End: 2021-11-15

## 2021-11-16 ENCOUNTER — APPOINTMENT (OUTPATIENT)
Dept: CT IMAGING | Facility: CLINIC | Age: 71
End: 2021-11-16

## 2021-11-16 LAB — SARS-COV-2 N GENE NPH QL NAA+PROBE: NOT DETECTED

## 2021-11-19 ENCOUNTER — APPOINTMENT (OUTPATIENT)
Dept: DISASTER EMERGENCY | Facility: CLINIC | Age: 71
End: 2021-11-19

## 2021-11-19 ENCOUNTER — APPOINTMENT (OUTPATIENT)
Dept: MRI IMAGING | Facility: CLINIC | Age: 71
End: 2021-11-19
Payer: MEDICARE

## 2021-11-19 ENCOUNTER — OUTPATIENT (OUTPATIENT)
Dept: OUTPATIENT SERVICES | Facility: HOSPITAL | Age: 71
LOS: 1 days | End: 2021-11-19

## 2021-11-19 DIAGNOSIS — K83.8 OTHER SPECIFIED DISEASES OF BILIARY TRACT: ICD-10-CM

## 2021-11-19 PROCEDURE — G1004: CPT

## 2021-11-19 PROCEDURE — 74183 MRI ABD W/O CNTR FLWD CNTR: CPT | Mod: 26,ME

## 2021-11-20 LAB — SARS-COV-2 N GENE NPH QL NAA+PROBE: NOT DETECTED

## 2021-11-23 ENCOUNTER — APPOINTMENT (OUTPATIENT)
Dept: GASTROENTEROLOGY | Facility: AMBULATORY MEDICAL SERVICES | Age: 71
End: 2021-11-23
Payer: MEDICARE

## 2021-11-23 ENCOUNTER — NON-APPOINTMENT (OUTPATIENT)
Age: 71
End: 2021-11-23

## 2021-11-23 PROCEDURE — 43239 EGD BIOPSY SINGLE/MULTIPLE: CPT

## 2021-11-24 ENCOUNTER — APPOINTMENT (OUTPATIENT)
Dept: GASTROENTEROLOGY | Facility: CLINIC | Age: 71
End: 2021-11-24

## 2021-11-28 ENCOUNTER — RX RENEWAL (OUTPATIENT)
Age: 71
End: 2021-11-28

## 2021-12-01 ENCOUNTER — APPOINTMENT (OUTPATIENT)
Dept: ORTHOPEDIC SURGERY | Facility: CLINIC | Age: 71
End: 2021-12-01
Payer: MEDICARE

## 2021-12-01 PROCEDURE — 64640 INJECTION TREATMENT OF NERVE: CPT

## 2021-12-01 PROCEDURE — 99214 OFFICE O/P EST MOD 30 MIN: CPT | Mod: 25

## 2021-12-01 NOTE — PROCEDURE
[de-identified] : After careful discussion with the patient regarding the risks versus benefits of a neurolysis procudre and local anesthetic injection, the patient has decided to proceed ahead with the treatment. Landmarks of the AFCN and IBSN was marked out and then sterilized with alcohol and betadine. The skin cutaneous tissue was injected with 1% lidocaine to anesthetize the procedure area.  The neurolysis devise was prepped and neurolysis was performed on the AFCN and IBSN nerve. The patient tolerated the injection without complication. The skin area was then cleaned and sterile Telfa Band-Aids was placed.

## 2021-12-01 NOTE — PHYSICAL EXAM
[LE] : Sensory: Intact in bilateral lower extremities [Knee] : patellar 2+ and symmetric bilaterally [de-identified] : 72 y/o pleasant  Female in NAD and AAOx3. 31.9 BMI. The pt has a mildly antalgic gait. Physical examination of both knees reveals normal contours, healed surgical scar with no signs of infection, no erythema, no swelling, no effusion, no distal lymphedema or phlebitis, no patholaxity. There is a fading/healing skin ecchymosis anterior medial to the patella.  ROM of the right knee reveals 0-125° with pain at end ROM especially on the anterior/lateral joint line. Strength is 4-5/5 within this arc of motion. There is minimal pain on palpation to the medial/anterior joint line. mild patellar femoral crepitus. There are no neurological deficits.

## 2021-12-01 NOTE — HISTORY OF PRESENT ILLNESS
[de-identified] : 70 y/o female with history of right TSR about 10 years ago and right TKR in 11/4/2020 by DR. Catalino Mendoza with Right knee arthroscopic adhesiolysis by Dr. Jensen on 6/2021 is overall doing well, but continued to having knee pain symptoms. She is here today to try neurolysis treatment. She still has point tenderness to the anterior/lateral joint line. Most of the time the pain is at rest and not with activities. She described the pain as a 6/10, which she takes Tylenol as needed. She ambulates with her rolling walker.

## 2021-12-01 NOTE — DISCUSSION/SUMMARY
[de-identified] : 72 y/o female s/p Right knee arthroscopic adhesiolysis on 6/2021 is overall doing well, but wth continued to having knee pain symptoms   After a lengthy discussion was held regarding the patients condition and treatment options including all risks, benefits, prognosis and outcomes of each were discussed in detail. The patient was also advised that this procedure will lessen her symptoms and might not fully resolve all her symptoms. The procedure will only temporary relieve her symptoms if it is successful, which usually only last for  days, in which her symptoms might return. The patient decided to proceed with the neurolysis treatment. The patient tolerated the procedure and soon after the procedure the patient notice immediate results. Her symptoms seemed to have improved. She was advised to ice the procedure area to help reduce swelling/irritation. She was advised on the possibility of ecchymosis and swelling, which is normal outcome after this procedure.She was also advised that If she has continue to have burning, irritation, or wound concerns to please call back the office for evaluation. The patient will contact me if there are any concerns.  Follow up will be prn. The patient expressed understanding and all questions were answered. Discussion, consent and procedure total time was approximately 2 hours. \par \par

## 2021-12-01 NOTE — CONSULT LETTER
[Dear  ___] : Dear  [unfilled], [Please see my note below.] : Please see my note below. [FreeTextEntry1] : I had the pleasure of evaluating your patient in the office today for neurolysis procedure s/p right total knee replacement. I have enclosed a copy of today's office notes for your charts and for your review.\par \par Sincerely, \par \par Van Rowan, MS LAINEZ\par [facility] [place] [facility]\par Department of Orthopedic Surgery\par [facility] [place]

## 2021-12-02 ENCOUNTER — APPOINTMENT (OUTPATIENT)
Dept: ORTHOPEDIC SURGERY | Facility: CLINIC | Age: 71
End: 2021-12-02
Payer: MEDICARE

## 2021-12-02 DIAGNOSIS — M19.132 POST-TRAUMATIC OSTEOARTHRITIS, LEFT WRIST: ICD-10-CM

## 2021-12-02 PROCEDURE — 99214 OFFICE O/P EST MOD 30 MIN: CPT

## 2021-12-02 PROCEDURE — 73110 X-RAY EXAM OF WRIST: CPT | Mod: LT

## 2021-12-04 NOTE — HISTORY OF PRESENT ILLNESS
[FreeTextEntry1] : 71 year-old RHD woman seen September 2020 for left wrist pain.  Radiographs demonstrated SLAC 3.  Bilateral basal joint arthritis also noted.  HMTS were helpful and new splints were prescribed to replace old splints.  Wrist cortisone injections discussed but not administered.\par Patient states that the thermoplastic left wrist support including thumb spica was next extremely beneficial and controlled pain.\par The orthosis however has broken and the patient needs a new left wrist support thumb spica thermoplastic orthosis.\par Patient reports that right basal joint pain is relatively mild and not notably interfering with ADL.\par Patient states that diclofenac gel has been somewhat helpful for the basal joint.\par Because of GI sensitivity patient is unable to tolerate and declines oral NSAID medication.\par Patient denies numbness, tingling, triggering.\par No other hand complaints.

## 2021-12-04 NOTE — PHYSICAL EXAM
[de-identified] : Left wrist:\par Extension/flexion 50 degrees / 50 degrees with mild pain.\par Maximum tenderness radial scaphoid interval, reproducible\par scaphoid thrust test exacerbates pain.  Good stability.\par Normal midcarpal tenderness between lunate and hamate.\par Basal joint stiffness with adduction.\par Mild pain associated with manipulation\par No A1 pulley tenderness and no triggering in any finger.\par No pertinent MP, PIP, or DIP joint contributory findings, except some Heberden's nodes; none are clinically painful.\par \par Right wrist\par No tenderness radial scaphoid interval.\par Extension/flexion no pain.\par Scaphoid shift test no pain\par Basal joint enlarged with adduction and stiffness.  Pain with manipulation.\par No A1 pulley tenderness and no triggering in any finger.\par No pertinent MP, PIP, or DIP joint contributory findings, except some Heberden's nodes; none are clinically painful.\par \par Neurologic: Median, ulnar, and radial motor and sensory are intact. \par Skin: No cyanosis, clubbing, or rashes.\par Vascular: Radial pulses intact.\par Lymphatic: No streaking or epitrochlear adenopathy.\par The patient is awake, alert, and oriented. Affect appropriate. Cooperative.  [de-identified] : Radiographs ordered and interpreted by me today, reviewed and discussed with the patient today.\par \par 5 views right hand and wrist\par Scapholunate dissociation.\par Radius scaphoid and radiolunate space is maintained.\par Midcarpal arthritis, lunate hamate sclerosis with loss of joint space.\par Slight flattening STT articulation.\par Faint radiodensity ulna carpal space, radial aspect of scaphoid possibly scapholunate interval suggestive of chondrocalcinosis.\par Basal joint sclerosis irregularity alteration of shape of first metacarpal base and trapezium large osteophyte formation radiographically stage III arthritis.\par Degenerative change at MP 2, 3 with mild arthritis.  Joint space maintained all MP joints.\par Marginal degenerative change with osteophyte are calcific formation PIP 2, 3, 4, 5.\par Similar change DIP 2, 3, 4, 5 and thumb IP joint although joint spaces are fairly well-preserved in the IP joints.\par No fractures or dislocations.\par \par 6 views left wrist and hand\par Loss of radial scaphoid joint space with sclerosis.  Scapholunate dissociation.\par Changes consistent with SLAC 3.\par Basal joint sclerosis with large osteophyte formation and alteration of joint space, radiographically stage III basal joint arthritis.\par MP joint spaces maintained.\par Minimal marginal change proximal ulnar corner of proximal phalanx MP2, MP3.\par Osteophyte formation sclerosis and varying degrees of degenerative change PIP 2, 3, 4, 5; DIP 3, 5.  No fractures or dislocations.

## 2021-12-04 NOTE — DISCUSSION/SUMMARY
[FreeTextEntry1] : Patient has SLAC left wrist and this is the pain generator.  Patient has basal joint arthritis but appears not to be as much of a pain generator.\par Patient would like a new wrist support thumb spica for the left wrist and thumb.  Patient prefers of this is made of thermoplastic material.  Prescription written.  Patient is referred to hand therapist.\par \par On the right the patient has basal joint arthritis which is the pain generator.  There is scapholunate dissociation, lunate hamate sclerosis and arthritis, but the midcarpal joint does not appear to be painful.  Even though there is scapholunate dissociation there is no evidence of loss of radial scaphoid joint space.  Patient requests an orthosis for the painful basal joint.  HMTS is indicated on the right.  Consequently, right HMTS prescribed.\par Wrist support with left thumb spica support is indicated and prescribed for the left hand.\par \par Patient has radiographic evidence of osteoarthritis at multiple MP and IP joints.  Pain is relatively mild at these joints despite radiographic appearance.\par No intervention is necessary given the low symptomatology.\par Prognosis is limited.  If symptoms are controlled, patient need not return.  Otherwise patient should return for follow-up, if she is symptomatic.\par  A lengthy and detailed discussion was held with the patient regarding analysis, treatment, and recommendations. All questions have been answered. At the conclusion the patient expressed acceptance and understanding.\par \par

## 2021-12-07 ENCOUNTER — NON-APPOINTMENT (OUTPATIENT)
Age: 71
End: 2021-12-07

## 2021-12-13 ENCOUNTER — APPOINTMENT (OUTPATIENT)
Dept: ORTHOPEDIC SURGERY | Facility: CLINIC | Age: 71
End: 2021-12-13
Payer: MEDICARE

## 2021-12-13 VITALS — WEIGHT: 160 LBS | HEIGHT: 60 IN | BODY MASS INDEX: 31.41 KG/M2

## 2021-12-13 DIAGNOSIS — M16.11 UNILATERAL PRIMARY OSTEOARTHRITIS, RIGHT HIP: ICD-10-CM

## 2021-12-13 PROCEDURE — 99214 OFFICE O/P EST MOD 30 MIN: CPT

## 2021-12-13 PROCEDURE — 73523 X-RAY EXAM HIPS BI 5/> VIEWS: CPT

## 2021-12-13 NOTE — HISTORY OF PRESENT ILLNESS
[de-identified] : This is very nice 71-year-old female experiencing right hip and groin and thigh pain, which is severe in intensity.  History of left total hip arthroplasty performed several years ago.  History of right total knee arthroplasty.  The pain does radiate down the leg to the knee.  The pain substantially limits activities of daily living. Walking tolerance is reduced. Medication and activity modification have been minimally effective for a period lasting greater than three months in duration. Assistive devices and external support were not deemed by the patient to be helpful in improving their function. Due to the severity of osteoarthritis and level of pain, physical therapy is contraindicated. Pain and restriction of function are intolerable at this time. The patient denies any radiation of the pain to the feet and it is not associated with numbness, tingling, or weakness. 2017

## 2021-12-13 NOTE — DISCUSSION/SUMMARY
[de-identified] : Well-functioning left total of arthroplasty.  She has severe right hip osteoarthritis and would like to proceed with right total hip arthroplasty using a posterior approach.  This will be cemented femoral component given her osteoporosis.  Before she performs a hip replacement surgery however she wants to undergo right hip intra-articular cortisone injection.  She does understand that this will need to delay surgery 90 days to mitigate the risk of infection caused by the hip cortisone injection.\par \par Also reports a penicillin allergy as a child.  She thinks that she had a rash.  This is a questionable allergy we will do a test of cefazolin in the operating room.  This to be done because cefazolin is 3 times as effective as other antibiotics and we may use.\par \par The patient is an appropriate candidate for consideration of right total hip replacement. An extensive discussion was conducted of the natural history of the disease and the variety of surgical and non-surgical treatment options available to the patient. A risk/benefit analysis was discussed with the patient reviewing the advantages and disadvantages of surgical intervention at this time. Both the level and length of the patient's pain have made additional conservative treatment measures consisting of NSAIDs, physical therapy, and/or corticosteroids contraindicated. A full explanation was given of the nature and the purpose of the procedure and anesthesia, its benefits, possible alternative methods of diagnosis or treatment, the risks involved, the possibility of complications, the foreseeable consequences of the procedure and the possible results of the non-treatment. I reviewed the plan of care as well as used a model of a total hip implant equivalent to the one that will be used for their total hip joint replacement. The ability to secure the implant utilizing cement or cementless (press-fit) was discussed with the patient. The patient agrees with the plan of care, as well as the use of implants for their total hip replacement. \par \par No guarantee or assurance was made as to the results that may be obtained. Specifically, the risks were identified to include, but are not limited to the following: Infection, phlebitis, pulmonary embolism, death, paralysis, dislocation, pain, stiffness, instability, limp, weakness, breakage, leg-length inequality, uncontrolled bleeding, nerve injury, blood vessel injury, pressure sores, anesthetic risks, delayed healing of wound and bone, and wear and loosening. Further discussion was undertaken with the patient about the details of surgical preparation, treatment, and postoperative rehabilitation including medical clearance, autotransfusion, the hospital course, and the postoperative rehabilitation involved. All in all, I feel that this patient is a good candidate for surgical reconstruction.\par \par The patient and I discussed the current SARS-CoV-2 (COVID-19) pandemic which has affected our local hospitals. We discussed that our hospitals treat patients with COVID-19. All efforts will be made to avoid cohorting the patient with diagnosed or suspected COVID-19 patient. They also understand that we will screen them 24-48 hours prior to surgery. Despite our best efforts, there is a potential risk for iatrogenic transmission of COVID-19 to the patient during the perioperative period. Mary COVID-19 during the perioperative period may increase the patient´s risks of an adverse outcome including postoperative pneumonia, difficulty breathing, requirement for a breathing tube (general endotracheal intubation), and death. The patient is understanding of this risk, and is willing to proceed with surgery at this time.

## 2021-12-13 NOTE — PHYSICAL EXAM
[de-identified] : Patient is well nourished, well-developed, in no acute distress, with appropriate mood and affect. The patient is oriented to time, place, and person. Respirations are even and unlabored. Gait evaluation does reveal a limp. There is no inguinal adenopathy.  The right limb is well-perfused and showed 2+ dp/pt pulses, without skin lesions, shows a grossly normal motor and sensory examination. Examination of the hip shows no skin lesions. Hip motion is full and painless from 0-90 degrees extension to flexion, 0 degrees adduction and 0 degrees abduction, and 5 degrees internal and 20 degrees external rotation. FADIR is negative and MAYLIN is negative. Stinchfield test is negative. The left limb is well-perfused and showed 2+ dp/pt pulses, without skin lesions, shows a grossly normal motor and sensory examination. Examination of the hip shows no skin lesions. Hip motion is full and painless from 0-90 degrees extension to flexion, 20 degrees adduction and 20 degrees abduction, and 15 degrees internal and 30 degrees external rotation. FADIR is negative and MAYLIN is negative. Stinchfield test is negative.  Leg lengths are approximately equal. Both hips are stable and muscle strength is normal with good strength with resisted abduction and adduction. Pedal pulses are palpable. [de-identified] : AP and lateral x-rays of the right hip, pelvis, and femur were ordered and taken in the office and demonstrate degenerative joint disease of the hip with joint space narrowing, osteophyte formation, and subchondral sclerosis.\par \par AP pelvis, AP hip, and lateral x-rays of the left hip were ordered and obtained in the office and demonstrate satisfactory position and alignment of the components are present. No signs of loosening are seen.

## 2021-12-13 NOTE — REASON FOR VISIT
[Follow-Up Visit] : a follow-up visit for [Artificial Hip Joint] : artificial hip joint [Hip Pain] : hip pain [Osteoarthritis, Hip] : osteoarthritis, hip

## 2021-12-16 ENCOUNTER — NON-APPOINTMENT (OUTPATIENT)
Age: 71
End: 2021-12-16

## 2021-12-17 ENCOUNTER — APPOINTMENT (OUTPATIENT)
Dept: INTERNAL MEDICINE | Facility: CLINIC | Age: 71
End: 2021-12-17
Payer: MEDICARE

## 2021-12-17 VITALS
HEART RATE: 94 BPM | DIASTOLIC BLOOD PRESSURE: 60 MMHG | BODY MASS INDEX: 31.25 KG/M2 | RESPIRATION RATE: 14 BRPM | SYSTOLIC BLOOD PRESSURE: 96 MMHG | WEIGHT: 160 LBS

## 2021-12-17 PROCEDURE — 99214 OFFICE O/P EST MOD 30 MIN: CPT

## 2021-12-17 RX ORDER — GABAPENTIN 100 MG/1
100 CAPSULE ORAL
Qty: 60 | Refills: 0 | Status: DISCONTINUED | COMMUNITY
Start: 2021-01-14 | End: 2021-12-17

## 2021-12-17 RX ORDER — ROPINIROLE 1 MG/1
1 TABLET, FILM COATED ORAL
Qty: 180 | Refills: 0 | Status: DISCONTINUED | COMMUNITY
Start: 2021-08-17 | End: 2021-12-17

## 2021-12-17 RX ORDER — DICLOFENAC SODIUM 1% 10 MG/G
1 GEL TOPICAL
Qty: 100 | Refills: 2 | Status: DISCONTINUED | COMMUNITY
Start: 2020-12-22 | End: 2021-12-17

## 2021-12-17 RX ORDER — HYDROCODONE BITARTRATE AND ACETAMINOPHEN 5; 325 MG/1; MG/1
5-325 TABLET ORAL
Qty: 50 | Refills: 0 | Status: DISCONTINUED | COMMUNITY
Start: 2021-07-01 | End: 2021-12-17

## 2021-12-17 RX ORDER — ERYTHROMYCIN 5 MG/G
5 OINTMENT OPHTHALMIC
Qty: 24 | Refills: 0 | Status: DISCONTINUED | COMMUNITY
Start: 2021-11-19 | End: 2021-12-17

## 2021-12-17 NOTE — HISTORY OF PRESENT ILLNESS
[Aortic Stenosis] : no aortic stenosis [Atrial Fibrillation] : no atrial fibrillation [Coronary Artery Disease] : no coronary artery disease [Recent Myocardial Infarction] : no recent myocardial infarction [Implantable Device/Pacemaker] : no implantable device/pacemaker [Asthma] : no asthma [COPD] : no COPD [Sleep Apnea] : no sleep apnea [Smoker] : not a smoker [Self] : no previous adverse anesthesia reaction [Chronic Anticoagulation] : no chronic anticoagulation [Chronic Kidney Disease] : no chronic kidney disease [Diabetes] : no diabetes [Unable to assess] : unable to assess [FreeTextEntry1] : GINNY SALAS [FreeTextEntry2] : 6 January 2022 [FreeTextEntry3] : Dr. Lance [FreeTextEntry4] : History of right knee and hip pain.  Unclear initially what was source of pain, now felt to be hip.   [FreeTextEntry6] : Denies headache, dizziness.\par Denies cough, wheezing.\par Denies CP, SOB, POLLARD, edema, palpitations.\par Denies abdominal pain, N/V/D/C. Denies BRBPR, melena, dysphagia.\par Denies dysuria, frequency, hematuria, hesitancy.\par No bleeding or bruising tendencies.\par

## 2021-12-17 NOTE — ASSESSMENT
[FreeTextEntry1] : A 71 year female scheduled for total hip replacement.  She is an acceptable risk to proceed with the planned procedure.  Her  chronic diseases are medically optimized.\par \par She  has no history of coronary artery disease.   \par The estimated risk of cardiac death,nonfatal MI or cardiac arrest based on the ACS operative risk core is approximately 0.2%.\par \par \par She has been advised not to use aspirin containing products for seven days prior to the procedure, and NSAIDs for 5 days prior to the procedure. \par \par She has been advised to hold the following medications the morning of the surgery: losartan and chlorthalidone\par \par Medications can be resumed postoperatively once diet is resumed as blood pressure and fluid status tolerates.\par \par She  has been advised to discontinue vitamins, supplements and herbal substances one week before procedure.\par \par Her pre-surgical tests are not yet available.  Will review upon receipt.\par \par 34 minutes spent in preparation of this visit, including review of previous notes and test results, interview and examination of patient, discussion of plan, arranging for appropriate testing and treatment, and documentation.\par \par \par \par \par

## 2021-12-23 ENCOUNTER — RESULT REVIEW (OUTPATIENT)
Age: 71
End: 2021-12-23

## 2021-12-23 ENCOUNTER — OUTPATIENT (OUTPATIENT)
Dept: OUTPATIENT SERVICES | Facility: HOSPITAL | Age: 71
LOS: 1 days | End: 2021-12-23
Payer: MEDICARE

## 2021-12-23 VITALS
WEIGHT: 166.89 LBS | TEMPERATURE: 98 F | SYSTOLIC BLOOD PRESSURE: 123 MMHG | DIASTOLIC BLOOD PRESSURE: 80 MMHG | OXYGEN SATURATION: 98 % | HEART RATE: 102 BPM | RESPIRATION RATE: 17 BRPM | HEIGHT: 63 IN

## 2021-12-23 DIAGNOSIS — Z96.651 PRESENCE OF RIGHT ARTIFICIAL KNEE JOINT: Chronic | ICD-10-CM

## 2021-12-23 DIAGNOSIS — M16.11 UNILATERAL PRIMARY OSTEOARTHRITIS, RIGHT HIP: ICD-10-CM

## 2021-12-23 DIAGNOSIS — M19.90 UNSPECIFIED OSTEOARTHRITIS, UNSPECIFIED SITE: ICD-10-CM

## 2021-12-23 DIAGNOSIS — Z01.818 ENCOUNTER FOR OTHER PREPROCEDURAL EXAMINATION: ICD-10-CM

## 2021-12-23 DIAGNOSIS — Z29.9 ENCOUNTER FOR PROPHYLACTIC MEASURES, UNSPECIFIED: ICD-10-CM

## 2021-12-23 DIAGNOSIS — Z98.890 OTHER SPECIFIED POSTPROCEDURAL STATES: Chronic | ICD-10-CM

## 2021-12-23 DIAGNOSIS — Z98.49 CATARACT EXTRACTION STATUS, UNSPECIFIED EYE: Chronic | ICD-10-CM

## 2021-12-23 DIAGNOSIS — G47.33 OBSTRUCTIVE SLEEP APNEA (ADULT) (PEDIATRIC): ICD-10-CM

## 2021-12-23 LAB
ANION GAP SERPL CALC-SCNC: 12 MMOL/L — SIGNIFICANT CHANGE UP (ref 5–17)
BLD GP AB SCN SERPL QL: NEGATIVE — SIGNIFICANT CHANGE UP
BUN SERPL-MCNC: 23 MG/DL — SIGNIFICANT CHANGE UP (ref 7–23)
CALCIUM SERPL-MCNC: 10.3 MG/DL — SIGNIFICANT CHANGE UP (ref 8.4–10.5)
CHLORIDE SERPL-SCNC: 101 MMOL/L — SIGNIFICANT CHANGE UP (ref 96–108)
CO2 SERPL-SCNC: 28 MMOL/L — SIGNIFICANT CHANGE UP (ref 22–31)
CREAT SERPL-MCNC: 0.94 MG/DL — SIGNIFICANT CHANGE UP (ref 0.5–1.3)
GLUCOSE SERPL-MCNC: 83 MG/DL — SIGNIFICANT CHANGE UP (ref 70–99)
HCT VFR BLD CALC: 41.1 % — SIGNIFICANT CHANGE UP (ref 34.5–45)
HGB BLD-MCNC: 13 G/DL — SIGNIFICANT CHANGE UP (ref 11.5–15.5)
MCHC RBC-ENTMCNC: 27.6 PG — SIGNIFICANT CHANGE UP (ref 27–34)
MCHC RBC-ENTMCNC: 31.6 GM/DL — LOW (ref 32–36)
MCV RBC AUTO: 87.3 FL — SIGNIFICANT CHANGE UP (ref 80–100)
NRBC # BLD: 0 /100 WBCS — SIGNIFICANT CHANGE UP (ref 0–0)
PLATELET # BLD AUTO: 276 K/UL — SIGNIFICANT CHANGE UP (ref 150–400)
POTASSIUM SERPL-MCNC: 4.4 MMOL/L — SIGNIFICANT CHANGE UP (ref 3.5–5.3)
POTASSIUM SERPL-SCNC: 4.4 MMOL/L — SIGNIFICANT CHANGE UP (ref 3.5–5.3)
RBC # BLD: 4.71 M/UL — SIGNIFICANT CHANGE UP (ref 3.8–5.2)
RBC # FLD: 12.9 % — SIGNIFICANT CHANGE UP (ref 10.3–14.5)
RH IG SCN BLD-IMP: NEGATIVE — SIGNIFICANT CHANGE UP
SODIUM SERPL-SCNC: 141 MMOL/L — SIGNIFICANT CHANGE UP (ref 135–145)
WBC # BLD: 5.78 K/UL — SIGNIFICANT CHANGE UP (ref 3.8–10.5)
WBC # FLD AUTO: 5.78 K/UL — SIGNIFICANT CHANGE UP (ref 3.8–10.5)

## 2021-12-23 PROCEDURE — 83036 HEMOGLOBIN GLYCOSYLATED A1C: CPT

## 2021-12-23 PROCEDURE — 87641 MR-STAPH DNA AMP PROBE: CPT

## 2021-12-23 PROCEDURE — 73700 CT LOWER EXTREMITY W/O DYE: CPT | Mod: 26,RT,ME

## 2021-12-23 PROCEDURE — 85027 COMPLETE CBC AUTOMATED: CPT

## 2021-12-23 PROCEDURE — 80048 BASIC METABOLIC PNL TOTAL CA: CPT

## 2021-12-23 PROCEDURE — 86850 RBC ANTIBODY SCREEN: CPT

## 2021-12-23 PROCEDURE — G1004: CPT

## 2021-12-23 PROCEDURE — 73700 CT LOWER EXTREMITY W/O DYE: CPT | Mod: ME

## 2021-12-23 PROCEDURE — 86901 BLOOD TYPING SEROLOGIC RH(D): CPT

## 2021-12-23 PROCEDURE — G0463: CPT

## 2021-12-23 PROCEDURE — 87640 STAPH A DNA AMP PROBE: CPT

## 2021-12-23 PROCEDURE — 86900 BLOOD TYPING SEROLOGIC ABO: CPT

## 2021-12-23 PROCEDURE — 36415 COLL VENOUS BLD VENIPUNCTURE: CPT

## 2021-12-23 RX ORDER — METHADONE HYDROCHLORIDE 40 MG/1
1 TABLET ORAL
Qty: 0 | Refills: 0 | DISCHARGE

## 2021-12-23 RX ORDER — ROPINIROLE 8 MG/1
2 TABLET, FILM COATED, EXTENDED RELEASE ORAL
Qty: 0 | Refills: 0 | DISCHARGE

## 2021-12-23 RX ORDER — GABAPENTIN 400 MG/1
1 CAPSULE ORAL
Qty: 0 | Refills: 0 | DISCHARGE

## 2021-12-23 RX ORDER — DENOSUMAB 60 MG/ML
0 INJECTION SUBCUTANEOUS
Qty: 0 | Refills: 0 | DISCHARGE

## 2021-12-23 NOTE — H&P PST ADULT - PROBLEM SELECTOR PLAN 1
Pt. is scheduled for Pt. is scheduled for right total hip arthroplasty with Hal robot posterior approach on 1/6/21.  Preop instructions reviewed, pt verbalized understanding.  Preop labs drawn (CBC, BMP, HGBA1C, MRSA, T& S).  Preop Covid swab scheduled for 1/3/21.  Pt. instructed to obtain Medical clearance prior to surgery.

## 2021-12-23 NOTE — H&P PST ADULT - FALL HARM RISK - HARM RISK INTERVENTIONS

## 2021-12-23 NOTE — H&P PST ADULT - NSICDXPASTSURGICALHX_GEN_ALL_CORE_FT
PAST SURGICAL HISTORY:  H/O arthroscopy of knee June 2021    H/O total knee replacement, right October 2020    Removal of pin, plate, abigail, or screw 1991- removal of billings abigail    S/P dilatation and curettage 1981    S/P hip replacement left (2008)    S/P hysterectomy 1982    S/P repair of paraesophageal hernia 2001    S/P shoulder surgery right (2012)    S/P spinal fusion L4-L5 1966    S/P spinal surgery x 2 1985, 1986    Scoliosis s/p placement of billings abigail x 2 1984     PAST SURGICAL HISTORY:  H/O arthroscopy of knee June 2021    H/O total knee replacement, right October 2020    Removal of pin, plate, abigail, or screw 1991- removal of billings abigial    S/P cataract surgery     S/P dilatation and curettage 1981    S/P hip replacement left (2008)    S/P hysterectomy 1982    S/P repair of paraesophageal hernia 2001    S/P shoulder surgery right (2012)    S/P spinal fusion L4-L5 1966    S/P spinal surgery x 2 1985, 1986    Scoliosis s/p placement of billings abigail x 2 1984

## 2021-12-23 NOTE — H&P PST ADULT - ASSESSMENT
MICHAELLEI VTE 2.0 SCORE [CLOT updated 2019]    AGE RELATED RISK FACTORS                                                       MOBILITY RELATED FACTORS  [ ] Age 41-60 years                                            (1 Point)                    [ ] Bed rest                                                        (1 Point)  [x ] Age: 61-74 years                                           (2 Points)                  [ ] Plaster cast                                                   (2 Points)  [ ] Age= 75 years                                              (3 Points)                    [ ] Bed bound for more than 72 hours                 (2 Points)    DISEASE RELATED RISK FACTORS                                               GENDER SPECIFIC FACTORS  [ ] Edema in the lower extremities                       (1 Point)              [ ] Pregnancy                                                     (1 Point)  [ ] Varicose veins                                               (1 Point)                     [ ] Post-partum < 6 weeks                                   (1 Point)             [x ] BMI > 25 Kg/m2                                            (1 Point)                     [ ] Hormonal therapy  or oral contraception          (1 Point)                 [ ] Sepsis (in the previous month)                        (1 Point)               [ ] History of pregnancy complications                 (1 point)  [ ] Pneumonia or serious lung disease                                               [ ] Unexplained or recurrent                     (1 Point)           (in the previous month)                               (1 Point)  [ ] Abnormal pulmonary function test                     (1 Point)                 SURGERY RELATED RISK FACTORS  [ ] Acute myocardial infarction                              (1 Point)               [ ]  Section                                             (1 Point)  [ ] Congestive heart failure (in the previous month)  (1 Point)      [ ] Minor surgery                                                  (1 Point)   [ ] Inflammatory bowel disease                             (1 Point)               [ ] Arthroscopic surgery                                        (2 Points)  [ ] Central venous access                                      (2 Points)                [ ] General surgery lasting more than 45 minutes (2 points)  [ ] Malignancy- Present or previous                   (2 Points)                [x ] Elective arthroplasty                                         (5 points)    [ ] Stroke (in the previous month)                          (5 Points)                                                                                                                                                           HEMATOLOGY RELATED FACTORS                                                 TRAUMA RELATED RISK FACTORS  [ ] Prior episodes of VTE                                     (3 Points)                [ ] Fracture of the hip, pelvis, or leg                       (5 Points)  [ ] Positive family history for VTE                         (3 Points)             [ ] Acute spinal cord injury (in the previous month)  (5 Points)  [ ] Prothrombin 01756 A                                     (3 Points)               [ ] Paralysis  (less than 1 month)                             (5 Points)  [ ] Factor V Leiden                                             (3 Points)                  [ ] Multiple Trauma within 1 month                        (5 Points)  [ ] Lupus anticoagulants                                     (3 Points)                                                           [ ] Anticardiolipin antibodies                               (3 Points)                                                       [ ] High homocysteine in the blood                      (3 Points)                                             [ ] Other congenital or acquired thrombophilia      (3 Points)                                                [ ] Heparin induced thrombocytopenia                  (3 Points)                                     Total Score [     8     ]

## 2021-12-23 NOTE — H&P PST ADULT - REASON FOR ADMISSION
right total hip replacement   Goal: "Hoping that the hip replacement will help with my right leg/knee pain".

## 2021-12-23 NOTE — H&P PST ADULT - HISTORY OF PRESENT ILLNESS
....Ms. French is a 71 year old woman with PMH HTN, depression, RLS on Methadone, allergic rhinitis, scoliosis had Bradford kaveh placed but removed many years later, OA s/p R TKR 2020 at [facility], then developed COVID Dec 2020 .once recovered she developed R knee pain 5/10 and has great difficulty walking (uses a walker and brace) or climbing stairs she is now scheduled for R knee arthroscopic adhesiolysis with possible lateral retinacular release.  She has a long time ago hx of multiple PEs after the Bradford Kaveh (1984) was placed was on coumadin previously and has had no problem since and no longer on anticoagulants.    Denies s/s of COVID, denies international travel    Preop Covid swab scheduled for 1/3/21.  71 year old female with history of right hip OA presents today for presurgical evaluation.  PMHs includes HTN, depression, RLS on Methadone, allergic rhinitis, scoliosis s/p multiple spine surgeries (Bradford kaveh placed but removed many years later), hx of multiple PE's after Bradford Kaveh surgery in 1984  (was previously on Coumadin), OA s/p R TKR 2020 at [facility], developed postop COVID-19 infection (Dec 2020).  She had right knee arthroscopy June 2021 for continued knee pain.  She continues to complain of right hip/knee pain causing difficulty walking and climbing stairs.  She is scheduled for total hip arthroplasty with COURTNEY robot posterior approach on 1/6/22.     She denies any recent infection, fever, chills, chest pain, cough, wheezing and shortness of breath.      Preop Covid swab scheduled for 1/3/21 at [facility].

## 2021-12-23 NOTE — H&P PST ADULT - NSICDXPASTMEDICALHX_GEN_ALL_CORE_FT
PAST MEDICAL HISTORY:  Depression     Essential hypertension     Fungal infection of skin under breast    GERD (gastroesophageal reflux disease)     H/O scoliosis     H/O seasonal allergies     History of pulmonary embolism after billings abigail surgery,1984 treated with coumadin 3 months, no issues after    OA (osteoarthritis)     Osteoporosis     Restless leg syndrome     Right hip pain      PAST MEDICAL HISTORY:  Depression     Essential hypertension     Fungal infection of skin under breast    GERD (gastroesophageal reflux disease)     H/O scoliosis     H/O seasonal allergies     History of pulmonary embolism developed after billings abigail surgery,1984 treated with coumadin 3 months, no issues after    OA (osteoarthritis)     Osteoporosis     Restless leg syndrome     Right hip pain      PAST MEDICAL HISTORY:  2019 novel coronavirus disease (COVID-19) Dec 2020- hospitalized for 3 days, did not require home O2, denies residual symptoms    Depression     Essential hypertension     Fungal infection of skin under breast    GERD (gastroesophageal reflux disease)     H/O scoliosis     H/O seasonal allergies     History of pulmonary embolism developed after billings abigail surgery,1984 treated with coumadin 3 months, no issues after    OA (osteoarthritis)     Osteoporosis     Restless leg syndrome     Right hip pain

## 2021-12-24 LAB
A1C WITH ESTIMATED AVERAGE GLUCOSE RESULT: 6 % — HIGH (ref 4–5.6)
ESTIMATED AVERAGE GLUCOSE: 126 MG/DL — HIGH (ref 68–114)
MRSA PCR RESULT.: SIGNIFICANT CHANGE UP
S AUREUS DNA NOSE QL NAA+PROBE: SIGNIFICANT CHANGE UP

## 2021-12-27 PROBLEM — M25.551 PAIN IN RIGHT HIP: Chronic | Status: ACTIVE | Noted: 2021-12-23

## 2021-12-27 PROBLEM — U07.1 COVID-19: Chronic | Status: ACTIVE | Noted: 2021-12-23

## 2022-01-03 ENCOUNTER — OUTPATIENT (OUTPATIENT)
Dept: OUTPATIENT SERVICES | Facility: HOSPITAL | Age: 72
LOS: 1 days | End: 2022-01-03

## 2022-01-03 DIAGNOSIS — Z96.651 PRESENCE OF RIGHT ARTIFICIAL KNEE JOINT: Chronic | ICD-10-CM

## 2022-01-03 DIAGNOSIS — Z98.49 CATARACT EXTRACTION STATUS, UNSPECIFIED EYE: Chronic | ICD-10-CM

## 2022-01-03 DIAGNOSIS — Z11.52 ENCOUNTER FOR SCREENING FOR COVID-19: ICD-10-CM

## 2022-01-03 DIAGNOSIS — Z98.890 OTHER SPECIFIED POSTPROCEDURAL STATES: Chronic | ICD-10-CM

## 2022-01-03 LAB — SARS-COV-2 RNA SPEC QL NAA+PROBE: SIGNIFICANT CHANGE UP

## 2022-01-04 ENCOUNTER — NON-APPOINTMENT (OUTPATIENT)
Age: 72
End: 2022-01-04

## 2022-01-05 ENCOUNTER — NON-APPOINTMENT (OUTPATIENT)
Age: 72
End: 2022-01-05

## 2022-01-05 ENCOUNTER — TRANSCRIPTION ENCOUNTER (OUTPATIENT)
Age: 72
End: 2022-01-05

## 2022-01-05 ENCOUNTER — FORM ENCOUNTER (OUTPATIENT)
Age: 72
End: 2022-01-05

## 2022-01-06 ENCOUNTER — APPOINTMENT (OUTPATIENT)
Dept: ORTHOPEDIC SURGERY | Facility: HOSPITAL | Age: 72
End: 2022-01-06

## 2022-01-06 ENCOUNTER — INPATIENT (INPATIENT)
Facility: HOSPITAL | Age: 72
LOS: 0 days | Discharge: HOME HEALTH | DRG: 470 | End: 2022-01-07
Attending: ORTHOPAEDIC SURGERY | Admitting: ORTHOPAEDIC SURGERY
Payer: COMMERCIAL

## 2022-01-06 VITALS
HEIGHT: 63 IN | TEMPERATURE: 98 F | SYSTOLIC BLOOD PRESSURE: 153 MMHG | HEART RATE: 74 BPM | OXYGEN SATURATION: 95 % | RESPIRATION RATE: 17 BRPM | DIASTOLIC BLOOD PRESSURE: 84 MMHG | WEIGHT: 166.89 LBS

## 2022-01-06 DIAGNOSIS — Z98.49 CATARACT EXTRACTION STATUS, UNSPECIFIED EYE: Chronic | ICD-10-CM

## 2022-01-06 DIAGNOSIS — Z98.890 OTHER SPECIFIED POSTPROCEDURAL STATES: Chronic | ICD-10-CM

## 2022-01-06 DIAGNOSIS — Z96.651 PRESENCE OF RIGHT ARTIFICIAL KNEE JOINT: Chronic | ICD-10-CM

## 2022-01-06 DIAGNOSIS — M16.11 UNILATERAL PRIMARY OSTEOARTHRITIS, RIGHT HIP: ICD-10-CM

## 2022-01-06 PROCEDURE — 72170 X-RAY EXAM OF PELVIS: CPT | Mod: 26

## 2022-01-06 PROCEDURE — 27130 TOTAL HIP ARTHROPLASTY: CPT | Mod: RT

## 2022-01-06 PROCEDURE — 20985 CPTR-ASST DIR MS PX: CPT

## 2022-01-06 DEVICE — RESTRIC CEM BUCK 18.5MM: Type: IMPLANTABLE DEVICE | Site: RIGHT | Status: FUNCTIONAL

## 2022-01-06 DEVICE — LINER ACET TRIDENT X3 10 DEG 32MM D: Type: IMPLANTABLE DEVICE | Site: RIGHT | Status: FUNCTIONAL

## 2022-01-06 DEVICE — CEMENT SIMPLEX P 40GM: Type: IMPLANTABLE DEVICE | Site: RIGHT | Status: FUNCTIONAL

## 2022-01-06 DEVICE — SPACER DISTAL MED 4-5 STEM 12: Type: IMPLANTABLE DEVICE | Site: RIGHT | Status: FUNCTIONAL

## 2022-01-06 DEVICE — SCREW HEX LO PROF 6.5X20MM: Type: IMPLANTABLE DEVICE | Site: RIGHT | Status: FUNCTIONAL

## 2022-01-06 DEVICE — HIP CEM ACCOLADE 132DEG 4: Type: IMPLANTABLE DEVICE | Site: RIGHT | Status: FUNCTIONAL

## 2022-01-06 DEVICE — SHELL ACET MULTIHOLE TRIDENT II D 50MM: Type: IMPLANTABLE DEVICE | Site: RIGHT | Status: FUNCTIONAL

## 2022-01-06 DEVICE — HEAD BIOLOX DELTA CERAMIC V40 32MM PLUS0: Type: IMPLANTABLE DEVICE | Site: RIGHT | Status: FUNCTIONAL

## 2022-01-06 DEVICE — MAKO CHECKPOINT 3.5MM HEX IMPACTION: Type: IMPLANTABLE DEVICE | Site: RIGHT | Status: FUNCTIONAL

## 2022-01-06 DEVICE — SCREW HEX LO PROF 6.5X25MM: Type: IMPLANTABLE DEVICE | Site: RIGHT | Status: FUNCTIONAL

## 2022-01-06 DEVICE — MAKO BONE PIN 4MM X 170MM: Type: IMPLANTABLE DEVICE | Site: RIGHT | Status: FUNCTIONAL

## 2022-01-06 RX ORDER — MONTELUKAST 4 MG/1
10 TABLET, CHEWABLE ORAL DAILY
Refills: 0 | Status: DISCONTINUED | OUTPATIENT
Start: 2022-01-06 | End: 2022-01-07

## 2022-01-06 RX ORDER — TRAMADOL HYDROCHLORIDE 50 MG/1
50 TABLET ORAL ONCE
Refills: 0 | Status: DISCONTINUED | OUTPATIENT
Start: 2022-01-06 | End: 2022-01-06

## 2022-01-06 RX ORDER — ROPINIROLE 8 MG/1
3 TABLET, FILM COATED, EXTENDED RELEASE ORAL AT BEDTIME
Refills: 0 | Status: DISCONTINUED | OUTPATIENT
Start: 2022-01-06 | End: 2022-01-07

## 2022-01-06 RX ORDER — HYDROMORPHONE HYDROCHLORIDE 2 MG/ML
0.5 INJECTION INTRAMUSCULAR; INTRAVENOUS; SUBCUTANEOUS
Refills: 0 | Status: DISCONTINUED | OUTPATIENT
Start: 2022-01-06 | End: 2022-01-06

## 2022-01-06 RX ORDER — SODIUM CHLORIDE 9 MG/ML
1000 INJECTION, SOLUTION INTRAVENOUS ONCE
Refills: 0 | Status: DISCONTINUED | OUTPATIENT
Start: 2022-01-06 | End: 2022-01-06

## 2022-01-06 RX ORDER — ASPIRIN/CALCIUM CARB/MAGNESIUM 324 MG
81 TABLET ORAL
Refills: 0 | Status: DISCONTINUED | OUTPATIENT
Start: 2022-01-06 | End: 2022-01-06

## 2022-01-06 RX ORDER — KETOROLAC TROMETHAMINE 30 MG/ML
15 SYRINGE (ML) INJECTION EVERY 6 HOURS
Refills: 0 | Status: DISCONTINUED | OUTPATIENT
Start: 2022-01-06 | End: 2022-01-07

## 2022-01-06 RX ORDER — ONDANSETRON 8 MG/1
4 TABLET, FILM COATED ORAL EVERY 6 HOURS
Refills: 0 | Status: DISCONTINUED | OUTPATIENT
Start: 2022-01-06 | End: 2022-01-07

## 2022-01-06 RX ORDER — VANCOMYCIN HCL 1 G
1000 VIAL (EA) INTRAVENOUS ONCE
Refills: 0 | Status: COMPLETED | OUTPATIENT
Start: 2022-01-06 | End: 2022-01-06

## 2022-01-06 RX ORDER — METHADONE HYDROCHLORIDE 40 MG/1
5 TABLET ORAL EVERY 12 HOURS
Refills: 0 | Status: DISCONTINUED | OUTPATIENT
Start: 2022-01-06 | End: 2022-01-07

## 2022-01-06 RX ORDER — OXYCODONE HYDROCHLORIDE 5 MG/1
10 TABLET ORAL EVERY 4 HOURS
Refills: 0 | Status: DISCONTINUED | OUTPATIENT
Start: 2022-01-06 | End: 2022-01-07

## 2022-01-06 RX ORDER — CHLORHEXIDINE GLUCONATE 213 G/1000ML
1 SOLUTION TOPICAL ONCE
Refills: 0 | Status: COMPLETED | OUTPATIENT
Start: 2022-01-06 | End: 2022-01-06

## 2022-01-06 RX ORDER — SODIUM CHLORIDE 9 MG/ML
3 INJECTION INTRAMUSCULAR; INTRAVENOUS; SUBCUTANEOUS EVERY 8 HOURS
Refills: 0 | Status: DISCONTINUED | OUTPATIENT
Start: 2022-01-06 | End: 2022-01-06

## 2022-01-06 RX ORDER — APIXABAN 2.5 MG/1
2.5 TABLET, FILM COATED ORAL
Refills: 0 | Status: DISCONTINUED | OUTPATIENT
Start: 2022-01-07 | End: 2022-01-07

## 2022-01-06 RX ORDER — POLYETHYLENE GLYCOL 3350 17 G/17G
17 POWDER, FOR SOLUTION ORAL AT BEDTIME
Refills: 0 | Status: DISCONTINUED | OUTPATIENT
Start: 2022-01-06 | End: 2022-01-07

## 2022-01-06 RX ORDER — CEFAZOLIN SODIUM 1 G
2000 VIAL (EA) INJECTION EVERY 8 HOURS
Refills: 0 | Status: DISCONTINUED | OUTPATIENT
Start: 2022-01-06 | End: 2022-01-06

## 2022-01-06 RX ORDER — CEFAZOLIN SODIUM 1 G
2000 VIAL (EA) INJECTION EVERY 8 HOURS
Refills: 0 | Status: COMPLETED | OUTPATIENT
Start: 2022-01-07 | End: 2022-01-07

## 2022-01-06 RX ORDER — HYDROMORPHONE HYDROCHLORIDE 2 MG/ML
0.5 INJECTION INTRAMUSCULAR; INTRAVENOUS; SUBCUTANEOUS ONCE
Refills: 0 | Status: DISCONTINUED | OUTPATIENT
Start: 2022-01-06 | End: 2022-01-07

## 2022-01-06 RX ORDER — SODIUM CHLORIDE 9 MG/ML
1000 INJECTION, SOLUTION INTRAVENOUS
Refills: 0 | Status: DISCONTINUED | OUTPATIENT
Start: 2022-01-06 | End: 2022-01-07

## 2022-01-06 RX ORDER — PANTOPRAZOLE SODIUM 20 MG/1
40 TABLET, DELAYED RELEASE ORAL
Refills: 0 | Status: DISCONTINUED | OUTPATIENT
Start: 2022-01-06 | End: 2022-01-06

## 2022-01-06 RX ORDER — SODIUM CHLORIDE 9 MG/ML
500 INJECTION, SOLUTION INTRAVENOUS ONCE
Refills: 0 | Status: DISCONTINUED | OUTPATIENT
Start: 2022-01-06 | End: 2022-01-06

## 2022-01-06 RX ORDER — LIDOCAINE HCL 20 MG/ML
0.2 VIAL (ML) INJECTION ONCE
Refills: 0 | Status: DISCONTINUED | OUTPATIENT
Start: 2022-01-06 | End: 2022-01-06

## 2022-01-06 RX ORDER — HYDROMORPHONE HYDROCHLORIDE 2 MG/ML
0.25 INJECTION INTRAMUSCULAR; INTRAVENOUS; SUBCUTANEOUS
Refills: 0 | Status: DISCONTINUED | OUTPATIENT
Start: 2022-01-06 | End: 2022-01-06

## 2022-01-06 RX ORDER — ACETAMINOPHEN 500 MG
975 TABLET ORAL EVERY 8 HOURS
Refills: 0 | Status: DISCONTINUED | OUTPATIENT
Start: 2022-01-07 | End: 2022-01-07

## 2022-01-06 RX ORDER — SENNA PLUS 8.6 MG/1
2 TABLET ORAL AT BEDTIME
Refills: 0 | Status: DISCONTINUED | OUTPATIENT
Start: 2022-01-06 | End: 2022-01-07

## 2022-01-06 RX ORDER — SODIUM CHLORIDE 9 MG/ML
1000 INJECTION, SOLUTION INTRAVENOUS ONCE
Refills: 0 | Status: COMPLETED | OUTPATIENT
Start: 2022-01-06 | End: 2022-01-06

## 2022-01-06 RX ORDER — PANTOPRAZOLE SODIUM 20 MG/1
40 TABLET, DELAYED RELEASE ORAL ONCE
Refills: 0 | Status: COMPLETED | OUTPATIENT
Start: 2022-01-06 | End: 2022-01-06

## 2022-01-06 RX ORDER — SODIUM CHLORIDE 9 MG/ML
1000 INJECTION, SOLUTION INTRAVENOUS
Refills: 0 | Status: DISCONTINUED | OUTPATIENT
Start: 2022-01-06 | End: 2022-01-06

## 2022-01-06 RX ORDER — CELECOXIB 200 MG/1
200 CAPSULE ORAL EVERY 12 HOURS
Refills: 0 | Status: DISCONTINUED | OUTPATIENT
Start: 2022-01-07 | End: 2022-01-07

## 2022-01-06 RX ORDER — LOSARTAN POTASSIUM 100 MG/1
100 TABLET, FILM COATED ORAL DAILY
Refills: 0 | Status: DISCONTINUED | OUTPATIENT
Start: 2022-01-07 | End: 2022-01-07

## 2022-01-06 RX ORDER — SODIUM CHLORIDE 9 MG/ML
500 INJECTION, SOLUTION INTRAVENOUS ONCE
Refills: 0 | Status: COMPLETED | OUTPATIENT
Start: 2022-01-06 | End: 2022-01-07

## 2022-01-06 RX ORDER — CHLORTHALIDONE 50 MG
25 TABLET ORAL DAILY
Refills: 0 | Status: DISCONTINUED | OUTPATIENT
Start: 2022-01-07 | End: 2022-01-07

## 2022-01-06 RX ORDER — FAMOTIDINE 10 MG/ML
40 INJECTION INTRAVENOUS AT BEDTIME
Refills: 0 | Status: DISCONTINUED | OUTPATIENT
Start: 2022-01-06 | End: 2022-01-07

## 2022-01-06 RX ORDER — DULOXETINE HYDROCHLORIDE 30 MG/1
20 CAPSULE, DELAYED RELEASE ORAL DAILY
Refills: 0 | Status: DISCONTINUED | OUTPATIENT
Start: 2022-01-06 | End: 2022-01-07

## 2022-01-06 RX ORDER — DEXAMETHASONE 0.5 MG/5ML
8 ELIXIR ORAL ONCE
Refills: 0 | Status: COMPLETED | OUTPATIENT
Start: 2022-01-07 | End: 2022-01-07

## 2022-01-06 RX ORDER — ACETAMINOPHEN 500 MG
1000 TABLET ORAL ONCE
Refills: 0 | Status: DISCONTINUED | OUTPATIENT
Start: 2022-01-06 | End: 2022-01-07

## 2022-01-06 RX ORDER — OXYCODONE HYDROCHLORIDE 5 MG/1
5 TABLET ORAL EVERY 4 HOURS
Refills: 0 | Status: DISCONTINUED | OUTPATIENT
Start: 2022-01-06 | End: 2022-01-07

## 2022-01-06 RX ORDER — TRAMADOL HYDROCHLORIDE 50 MG/1
50 TABLET ORAL EVERY 6 HOURS
Refills: 0 | Status: DISCONTINUED | OUTPATIENT
Start: 2022-01-06 | End: 2022-01-07

## 2022-01-06 RX ORDER — SODIUM CHLORIDE 9 MG/ML
500 INJECTION, SOLUTION INTRAVENOUS ONCE
Refills: 0 | Status: COMPLETED | OUTPATIENT
Start: 2022-01-06 | End: 2022-01-06

## 2022-01-06 RX ORDER — APIXABAN 2.5 MG/1
2.5 TABLET, FILM COATED ORAL
Refills: 0 | Status: DISCONTINUED | OUTPATIENT
Start: 2022-01-06 | End: 2022-01-06

## 2022-01-06 RX ADMIN — METHADONE HYDROCHLORIDE 5 MILLIGRAM(S): 40 TABLET ORAL at 22:15

## 2022-01-06 RX ADMIN — OXYCODONE HYDROCHLORIDE 10 MILLIGRAM(S): 5 TABLET ORAL at 20:35

## 2022-01-06 RX ADMIN — SODIUM CHLORIDE 75 MILLILITER(S): 9 INJECTION, SOLUTION INTRAVENOUS at 18:57

## 2022-01-06 RX ADMIN — Medication 250 MILLIGRAM(S): at 12:00

## 2022-01-06 RX ADMIN — CHLORHEXIDINE GLUCONATE 1 APPLICATION(S): 213 SOLUTION TOPICAL at 12:18

## 2022-01-06 RX ADMIN — HYDROMORPHONE HYDROCHLORIDE 0.25 MILLIGRAM(S): 2 INJECTION INTRAMUSCULAR; INTRAVENOUS; SUBCUTANEOUS at 17:53

## 2022-01-06 RX ADMIN — SODIUM CHLORIDE 500 MILLILITER(S): 9 INJECTION, SOLUTION INTRAVENOUS at 18:17

## 2022-01-06 RX ADMIN — HYDROMORPHONE HYDROCHLORIDE 0.25 MILLIGRAM(S): 2 INJECTION INTRAMUSCULAR; INTRAVENOUS; SUBCUTANEOUS at 18:56

## 2022-01-06 RX ADMIN — FAMOTIDINE 40 MILLIGRAM(S): 10 INJECTION INTRAVENOUS at 23:24

## 2022-01-06 RX ADMIN — SODIUM CHLORIDE 3 MILLILITER(S): 9 INJECTION INTRAMUSCULAR; INTRAVENOUS; SUBCUTANEOUS at 12:18

## 2022-01-06 RX ADMIN — SENNA PLUS 2 TABLET(S): 8.6 TABLET ORAL at 22:14

## 2022-01-06 RX ADMIN — Medication 150 MILLIGRAM(S): at 12:04

## 2022-01-06 RX ADMIN — PANTOPRAZOLE SODIUM 40 MILLIGRAM(S): 20 TABLET, DELAYED RELEASE ORAL at 12:04

## 2022-01-06 RX ADMIN — TRAMADOL HYDROCHLORIDE 50 MILLIGRAM(S): 50 TABLET ORAL at 12:05

## 2022-01-06 RX ADMIN — HYDROMORPHONE HYDROCHLORIDE 0.25 MILLIGRAM(S): 2 INJECTION INTRAMUSCULAR; INTRAVENOUS; SUBCUTANEOUS at 18:15

## 2022-01-06 RX ADMIN — HYDROMORPHONE HYDROCHLORIDE 0.25 MILLIGRAM(S): 2 INJECTION INTRAMUSCULAR; INTRAVENOUS; SUBCUTANEOUS at 18:27

## 2022-01-06 RX ADMIN — Medication 15 MILLIGRAM(S): at 22:14

## 2022-01-06 RX ADMIN — SODIUM CHLORIDE 1000 MILLILITER(S): 9 INJECTION, SOLUTION INTRAVENOUS at 23:23

## 2022-01-06 RX ADMIN — POLYETHYLENE GLYCOL 3350 17 GRAM(S): 17 POWDER, FOR SOLUTION ORAL at 22:13

## 2022-01-06 NOTE — PHYSICAL THERAPY INITIAL EVALUATION ADULT - GAIT DEVIATIONS NOTED, PT EVAL
decreased azeem/increased time in double stance/decreased velocity of limb motion/decreased step length/decreased stride length/decreased weight-shifting ability

## 2022-01-06 NOTE — PRE-ANESTHESIA EVALUATION ADULT - NSANTHPMHFT_GEN_ALL_CORE
70yo F /o HTN, restless leg syndrome on methadone 5mg daily (took today), h/o spinal fusion and Bradford rods s/p removal here for right total hip replacement  Patient reports that she may have had a spinal anesthetic for her knee replacement last year but is unsure  ET > 4 METS though limited by pain  No cardiopulmonary disease, no liver / kidney disease  No easy bleeding, no anticoagulation

## 2022-01-06 NOTE — PHYSICAL THERAPY INITIAL EVALUATION ADULT - ADDITIONAL COMMENTS
Pt resides in a condo with son, 0 steps to enter, elevator, PTA independent with mobility and ADL's, ambulatory with rollator walker, has raised toilet seat.

## 2022-01-06 NOTE — PATIENT PROFILE ADULT - DATE OF FIRST COVID-19 BOOSTER
Patient Education     Lifestyle Changes for Controlling GERD  When you have GERD, stomach acid feels as if it’s backing up toward your mouth. Making lifestyle changes can often improve your symptoms. This is true if you take medicine to control your GERD or not. Talk with your healthcare provider about the following suggestions. They may help you get relief from your symptoms.       Raise your head  Reflux is more likely to happen when you’re lying down flat. That's because stomach fluid can flow backward more easily. Raising the head of your bed 4 to 6 inches can help. To do this:   · Slide blocks or books under the legs at the head of your bed. Or put a wedge under the mattress. Many foam stores can make a wedge for you. The wedge should go from your waist to the top of your head.  · Don’t just prop your head up on a few pillows. This increases pressure on your stomach. It can make GERD worse.  Watch your eating habits  Certain foods may increase the acid in your stomach. Or they may relax the lower esophageal sphincter. This makes GERD more likely. It’s best to avoid the following if they cause you symptoms:   · Coffee, tea, and carbonated drinks (with and without caffeine)  · Fatty, fried, or spicy food  · Mint, chocolate, onions, tomatoes, and citrus  · Peppermint  · Any other foods that seem to irritate your stomach or cause you pain  Relieve the pressure  Tips include the following:  · Eat smaller meals, even if you have to eat more often.  · Don’t lie down right after you eat. Wait a few hours for your stomach to empty.  · Don't wear tight belts or tight-fitting clothes.  · Lose any extra weight.  Tobacco and alcohol  Don't smoke tobacco or drink alcohol. They can make GERD symptoms worse.   [employer] last reviewed this educational content on 6/1/2019 © 2000-2020 [employer]. [address], [place] [address]. All rights reserved. This information is not intended as a substitute for  professional medical care. Always follow your healthcare professional's instructions.         Patient Education     Paraesthesias  Paraesthesia is a burning or prickling sensation that is sometimes felt in the hands, arms, legs or feet. It can also occur in other parts of the body. It can also feel like tingling or numbness, skin crawling, or itching. The feeling is not comfortable, but it is not painful. (The \"pins and needles\" feeling that happens when a foot or hand \"falls asleep\" is a temporary paraesthesia.)  Paraesthesias that last or come and go may be caused by medical issues that need to be treated. These include stroke, a bulging disk pressing on a nerve, a trapped nerve, vitamin deficiencies, uncontrolled diabetes, alcohol abuse, or even certain medicines.  Tests are often done. These tests may include blood tests, X-ray, CT (computerized tomography) scan, nerve conduction studies (NCS), or a muscle test (electromyography). Depending on the cause, treatment may include physical therapy.  Home care  · Tell your healthcare provider about all medicines you take. This includes prescription and over-the-counter medicines, vitamins, and herbs. Ask if any of the medicines may be causing your problems. Don't make any changes to prescription medicines without talking to your healthcare provider first.  · You may be prescribed medicines to help relieve the tingling feeling or for pain. Take all medicines as directed.  · A numb hand or foot may be more prone to injury. To help protect it:  ? Always use oven mitts.  ? Test water with an unaffected hand or foot.  ? Use caution when trimming nails. File sharp areas.  ? Wear shoes that fit well to avoid pressure points, blisters, and ulcers.  ? Inspect your hands and feet carefully (including the soles of your feet and between your toes) daily. If you see red areas, sores, or other problems, tell your healthcare provider.  Follow-up care  Follow up with your doctor, or  as advised. You may need further testing or evaluation.     When to seek medical advice  Call your healthcare provider right away if any of the following occur:  · Numbness or weakness of the face, one arm, or one leg  · Slurred speech, confusion, trouble speaking, walking, or seeing  · Severe headache, fainting spell, dizziness, or seizure  · Chest, arm, neck, or upper back pain  · Loss of bladder or bowel control  · Open wound with redness, swelling, or pus     [employer] last reviewed this educational content on 4/1/2018  © 2016-2682 [employer]. All rights reserved. This information is not intended as a substitute for professional medical care. Always follow your healthcare professional's instructions.            16-Dec-2021

## 2022-01-06 NOTE — CHART NOTE - NSCHARTNOTEFT_GEN_A_CORE
POC    Resting without complaints RR   No Chest Pain, SOB, N/V.    T(C): 36.3 (01-06-22 @ 18:30), Max: 36.6 (01-06-22 @ 11:07)  HR: 65 (01-06-22 @ 19:00) (59 - 81)  BP: 116/55 (01-06-22 @ 19:00) (116/55 - 153/84)  RR: 16 (01-06-22 @ 19:00) (15 - 17)  SpO2: 93% (01-06-22 @ 19:00) (93% - 100%)  Wt(kg): --    Exam:  NAD Alert / oriented  Pulm: CTAB  Abdomen soft / benign  Blake  [n ]   EXT   RLE       ABD pillow in place       Aquacel dressing C/D [ ]        Calves soft       (+) DF  Pf;  EHL / FHL 5/5        No Sensory Deficits noted        2+ pulses    Xray:---- B/L prosthesis in good alignment     A/P: S/p Robot-assisted total replacement of right hip    -PT/OT-WBAT- posterior precaution  -Chk AM Labs  -DVT PPx: Eliquis BID  -Pain Control PO/IV Pain Rx  -Continue Current Tx     Home Rx readjusted  -Dispo planning: anticipate home     Note: Pt received ANCEF in OR w/o complications    ***See Above  Ramon BALL  Orthopedics  B: [phone]

## 2022-01-06 NOTE — PHYSICAL THERAPY INITIAL EVALUATION ADULT - GENERAL OBSERVATIONS, REHAB EVAL
received semisupine in bed, pleasant & eager to participate, s/p right MAIK(1/6), RLE WBAT, posterior precautions reviewed & maintained

## 2022-01-06 NOTE — PHYSICAL THERAPY INITIAL EVALUATION ADULT - PERTINENT HX OF CURRENT PROBLEM, REHAB EVAL
71 y.o. F PMH right hip OA, HTN, depression, RLS on Methadone, allergic rhinitis, scoliosis s/p multiple spine surgeries (Bradford abigail placed but removed many years later), hx of multiple PE's, OA s/p R TKR 2020 at [facility], developed postop COVID-19 infection. R knee arthroscopy June 2021 for continued knee pain.  She continues to complain of right hip/knee pain causing difficulty walking and climbing stairs. Now s/p R total hip replacement on 1/6/22.

## 2022-01-06 NOTE — PHYSICAL THERAPY INITIAL EVALUATION ADULT - PLANNED THERAPY INTERVENTIONS, PT EVAL
GOALS: Pt will negotiate 5 steps with unilateral handrail & step to pattern with CGA in 4wks/bed mobility training/gait training/transfer training

## 2022-01-06 NOTE — PATIENT PROFILE ADULT - FALL HARM RISK - HARM RISK INTERVENTIONS

## 2022-01-06 NOTE — PRE-ANESTHESIA EVALUATION ADULT - NSANTHADDINFOFT_GEN_ALL_CORE
After discussing with patient and surgeon, the plan is to attempt a spinal anesthetic as per patient's wishes for improved post-op pain control. The patient is aware and understands that her history of back fusion may make spinal placement difficult / unsuccessful. Back-up plan s general anesthesia with endotracheal intubation

## 2022-01-07 ENCOUNTER — TRANSCRIPTION ENCOUNTER (OUTPATIENT)
Age: 72
End: 2022-01-07

## 2022-01-07 LAB
ANION GAP SERPL CALC-SCNC: 13 MMOL/L — SIGNIFICANT CHANGE UP (ref 5–17)
BUN SERPL-MCNC: 22 MG/DL — SIGNIFICANT CHANGE UP (ref 7–23)
CALCIUM SERPL-MCNC: 8 MG/DL — LOW (ref 8.4–10.5)
CHLORIDE SERPL-SCNC: 101 MMOL/L — SIGNIFICANT CHANGE UP (ref 96–108)
CO2 SERPL-SCNC: 21 MMOL/L — LOW (ref 22–31)
CREAT SERPL-MCNC: 0.84 MG/DL — SIGNIFICANT CHANGE UP (ref 0.5–1.3)
GLUCOSE SERPL-MCNC: 118 MG/DL — HIGH (ref 70–99)
HCT VFR BLD CALC: 29.3 % — LOW (ref 34.5–45)
HGB BLD-MCNC: 9.5 G/DL — LOW (ref 11.5–15.5)
MCHC RBC-ENTMCNC: 28.3 PG — SIGNIFICANT CHANGE UP (ref 27–34)
MCHC RBC-ENTMCNC: 32.4 GM/DL — SIGNIFICANT CHANGE UP (ref 32–36)
MCV RBC AUTO: 87.2 FL — SIGNIFICANT CHANGE UP (ref 80–100)
NRBC # BLD: 0 /100 WBCS — SIGNIFICANT CHANGE UP (ref 0–0)
PLATELET # BLD AUTO: 190 K/UL — SIGNIFICANT CHANGE UP (ref 150–400)
POTASSIUM SERPL-MCNC: 3.9 MMOL/L — SIGNIFICANT CHANGE UP (ref 3.5–5.3)
POTASSIUM SERPL-SCNC: 3.9 MMOL/L — SIGNIFICANT CHANGE UP (ref 3.5–5.3)
RBC # BLD: 3.36 M/UL — LOW (ref 3.8–5.2)
RBC # FLD: 13.4 % — SIGNIFICANT CHANGE UP (ref 10.3–14.5)
SODIUM SERPL-SCNC: 135 MMOL/L — SIGNIFICANT CHANGE UP (ref 135–145)
WBC # BLD: 8.43 K/UL — SIGNIFICANT CHANGE UP (ref 3.8–10.5)
WBC # FLD AUTO: 8.43 K/UL — SIGNIFICANT CHANGE UP (ref 3.8–10.5)

## 2022-01-07 PROCEDURE — 72170 X-RAY EXAM OF PELVIS: CPT

## 2022-01-07 PROCEDURE — 97535 SELF CARE MNGMENT TRAINING: CPT

## 2022-01-07 PROCEDURE — U0005: CPT

## 2022-01-07 PROCEDURE — U0003: CPT

## 2022-01-07 PROCEDURE — 97161 PT EVAL LOW COMPLEX 20 MIN: CPT

## 2022-01-07 PROCEDURE — 97110 THERAPEUTIC EXERCISES: CPT

## 2022-01-07 PROCEDURE — S2900: CPT

## 2022-01-07 PROCEDURE — 80048 BASIC METABOLIC PNL TOTAL CA: CPT

## 2022-01-07 PROCEDURE — C9399: CPT

## 2022-01-07 PROCEDURE — C9803: CPT

## 2022-01-07 PROCEDURE — 27130 TOTAL HIP ARTHROPLASTY: CPT

## 2022-01-07 PROCEDURE — C1713: CPT

## 2022-01-07 PROCEDURE — 85027 COMPLETE CBC AUTOMATED: CPT

## 2022-01-07 PROCEDURE — C1776: CPT

## 2022-01-07 PROCEDURE — C1889: CPT

## 2022-01-07 PROCEDURE — 97116 GAIT TRAINING THERAPY: CPT

## 2022-01-07 PROCEDURE — 82962 GLUCOSE BLOOD TEST: CPT

## 2022-01-07 PROCEDURE — 97165 OT EVAL LOW COMPLEX 30 MIN: CPT

## 2022-01-07 RX ORDER — ACETAMINOPHEN 500 MG
3 TABLET ORAL
Qty: 0 | Refills: 0 | DISCHARGE
Start: 2022-01-07

## 2022-01-07 RX ORDER — SENNA PLUS 8.6 MG/1
2 TABLET ORAL
Qty: 0 | Refills: 0 | DISCHARGE
Start: 2022-01-07

## 2022-01-07 RX ORDER — APIXABAN 2.5 MG/1
1 TABLET, FILM COATED ORAL
Qty: 60 | Refills: 0
Start: 2022-01-07 | End: 2022-02-05

## 2022-01-07 RX ORDER — TRAMADOL HYDROCHLORIDE 50 MG/1
1 TABLET ORAL
Qty: 28 | Refills: 0
Start: 2022-01-07 | End: 2022-01-13

## 2022-01-07 RX ORDER — DENOSUMAB 60 MG/ML
0 INJECTION SUBCUTANEOUS
Qty: 0 | Refills: 0 | DISCHARGE

## 2022-01-07 RX ORDER — TRAMADOL HYDROCHLORIDE 50 MG/1
1 TABLET ORAL
Qty: 28 | Refills: 0
Start: 2022-01-07

## 2022-01-07 RX ORDER — OXYCODONE HYDROCHLORIDE 5 MG/1
1 TABLET ORAL
Qty: 35 | Refills: 0
Start: 2022-01-07

## 2022-01-07 RX ORDER — OXYCODONE HYDROCHLORIDE 5 MG/1
1 TABLET ORAL
Qty: 50 | Refills: 0
Start: 2022-01-07

## 2022-01-07 RX ADMIN — DULOXETINE HYDROCHLORIDE 20 MILLIGRAM(S): 30 CAPSULE, DELAYED RELEASE ORAL at 14:11

## 2022-01-07 RX ADMIN — Medication 15 MILLIGRAM(S): at 17:17

## 2022-01-07 RX ADMIN — APIXABAN 2.5 MILLIGRAM(S): 2.5 TABLET, FILM COATED ORAL at 17:17

## 2022-01-07 RX ADMIN — Medication 1 TABLET(S): at 14:11

## 2022-01-07 RX ADMIN — Medication 975 MILLIGRAM(S): at 04:13

## 2022-01-07 RX ADMIN — Medication 975 MILLIGRAM(S): at 14:20

## 2022-01-07 RX ADMIN — Medication 15 MILLIGRAM(S): at 14:12

## 2022-01-07 RX ADMIN — MONTELUKAST 10 MILLIGRAM(S): 4 TABLET, CHEWABLE ORAL at 14:13

## 2022-01-07 RX ADMIN — CELECOXIB 200 MILLIGRAM(S): 200 CAPSULE ORAL at 17:17

## 2022-01-07 RX ADMIN — APIXABAN 2.5 MILLIGRAM(S): 2.5 TABLET, FILM COATED ORAL at 04:11

## 2022-01-07 RX ADMIN — SODIUM CHLORIDE 500 MILLILITER(S): 9 INJECTION, SOLUTION INTRAVENOUS at 06:00

## 2022-01-07 RX ADMIN — METHADONE HYDROCHLORIDE 5 MILLIGRAM(S): 40 TABLET ORAL at 14:11

## 2022-01-07 RX ADMIN — ROPINIROLE 3 MILLIGRAM(S): 8 TABLET, FILM COATED, EXTENDED RELEASE ORAL at 00:20

## 2022-01-07 RX ADMIN — Medication 100 MILLIGRAM(S): at 14:10

## 2022-01-07 RX ADMIN — Medication 15 MILLIGRAM(S): at 04:12

## 2022-01-07 RX ADMIN — CELECOXIB 200 MILLIGRAM(S): 200 CAPSULE ORAL at 04:12

## 2022-01-07 RX ADMIN — Medication 100 MILLIGRAM(S): at 02:25

## 2022-01-07 RX ADMIN — Medication 101.6 MILLIGRAM(S): at 04:11

## 2022-01-07 NOTE — DISCHARGE NOTE PROVIDER - CARE PROVIDER_API CALL
Guille Lance)  Orthopaedic Surgery  [address], [address]  [place] [address]  Phone: [phone]  Fax: [phone]  Follow Up Time:

## 2022-01-07 NOTE — DISCHARGE NOTE NURSING/CASE MANAGEMENT/SOCIAL WORK - DATE OF LAST VACCINATION
08-Mar-2021
Patient instructed on extended cardiac monitor indications and use. Diary sent with patient.     14 day Mikal
No

## 2022-01-07 NOTE — OCCUPATIONAL THERAPY INITIAL EVALUATION ADULT - ANTICIPATED DISCHARGE DISPOSITION, OT EVAL
for ADLs and safety assessment in home environment. Assist with ADLs as needed from son. Pt owns shower chair, raised toilet seat and rollator/home w/ OT

## 2022-01-07 NOTE — DISCHARGE NOTE NURSING/CASE MANAGEMENT/SOCIAL WORK - NSDCPEFALRISK_GEN_ALL_CORE
For information on Fall & Injury Prevention, visit: [email or web address] OR  [email or web address] OR  https://www.cdc.gov/steadi/patient.html

## 2022-01-07 NOTE — DISCHARGE NOTE PROVIDER - NSDCMRMEDTOKEN_GEN_ALL_CORE_FT
Aleve 220 mg oral tablet: 1 tab(s) orally once a day (at bedtime), As Needed. Instructed to stop on 12/30/21.  chlorthalidone 25 mg oral tablet: 1 tab(s) orally once a day  DULoxetine 20 mg oral delayed release capsule: 1 cap(s) orally once a day  famotidine 40 mg oral tablet: 1 tab(s) orally once a day (at bedtime)  Glucosamine Chondroitin oral capsule: 3 cap(s) orally once a day. Instructed to stop on 12/30/21.  Linzess 72 mcg oral capsule: 1 cap(s) orally once a day  losartan 100 mg oral tablet: 1 tab(s) orally once a day  methadone 5 mg oral tablet: 1 tab(s) orally 2 times a day  Prolia 60 mg/mL subcutaneous solution: Every 6 months  rOPINIRole 3 mg oral tablet, extended release: orally once a day (at bedtime)  Singulair 10 mg oral tablet: 1 tab(s) orally once a day  Turmeric 500 mg oral capsule: 1 cap(s) orally once a day. Instructed to stop on 12/30/21.   acetaminophen 325 mg oral tablet: Take 3 tabs oral every 8 hours x 5 days, then as needed for mild pain, temp &gt;100.4F   chlorthalidone 25 mg oral tablet: 1 tab(s) orally once a day  DULoxetine 20 mg oral delayed release capsule: 1 cap(s) orally once a day  Eliquis 2.5 mg oral tablet: 1 tab(s) orally 2 times a day x 30 days  famotidine 40 mg oral tablet: 1 tab(s) orally once a day (at bedtime)  Glucosamine Chondroitin oral capsule: 3 cap(s) orally once a day. Instructed to stop on 12/30/21.  Linzess 72 mcg oral capsule: 1 cap(s) orally once a day  losartan 100 mg oral tablet: 1 tab(s) orally once a day  methadone 5 mg oral tablet: 1 tab(s) orally 2 times a day  naproxen 500 mg oral tablet: 1 tab(s) orally every 12 hours, As Needed, for pain. MDD:2  oxyCODONE 5 mg oral tablet: 1 tabs orally every 4-6 hours as needed for moderate pain, 2 tab(s) orally every 4-6 hours, As Needed for  severe pain MDD:8  rOPINIRole 3 mg oral tablet, extended release: orally once a day (at bedtime)  senna oral tablet: 2 tab(s) orally once a day (at bedtime)  Singulair 10 mg oral tablet: 1 tab(s) orally once a day  traMADol 50 mg oral tablet: 1 tab(s) orally every 6 hours, As needed, Mild Pain (1 - 3) MDD:4  Turmeric 500 mg oral capsule: 1 cap(s) orally once a day. Instructed to stop on 12/30/21.

## 2022-01-07 NOTE — DISCHARGE NOTE NURSING/CASE MANAGEMENT/SOCIAL WORK - PATIENT PORTAL LINK FT
You can access the FollowMyHealth Patient Portal offered by [facility] by registering at the following website: [email or web address]. By joining [employer]’s FollowMyHealth portal, you will also be able to view your health information using other applications (apps) compatible with our system.

## 2022-01-07 NOTE — OCCUPATIONAL THERAPY INITIAL EVALUATION ADULT - LIVES WITH, PROFILE
Pt lives with son in condo, 0 steps to enter +elevator, tub with shower chair. Pt I in ADLs and ambulates with RW prior to admission

## 2022-01-07 NOTE — DISCHARGE NOTE PROVIDER - BRAND OF COVID-19 VACCINATION
How Severe Is Your Skin Lesion?: moderate Have Your Skin Lesions Been Treated?: not been treated Is This A New Presentation, Or A Follow-Up?: Skin Lesions Moderna dose 1 and 2

## 2022-01-07 NOTE — OCCUPATIONAL THERAPY INITIAL EVALUATION ADULT - ADDITIONAL COMMENTS
Hybrid right total hip prosthesis with cemented femoral stem and screw fixated acetabular cup implanted. Xray Pelvis: Medial acetabular screw protrudes into upper lateral aspect of obturator foramen. Otherwise intact and aligned hardware and no periprosthetic fractures. Postoperative soft tissue changes.Correlate with intraoperative findings.

## 2022-01-07 NOTE — OCCUPATIONAL THERAPY INITIAL EVALUATION ADULT - MANUAL MUSCLE TESTING RESULTS, REHAB EVAL
at least 3/5 throughout, B shoulders limited by pain and baseline shoulder issue/grossly assessed due to

## 2022-01-07 NOTE — DISCHARGE NOTE PROVIDER - HOSPITAL COURSE
Reason for Admission	right total hip replacement   Goal: "Hoping that the hip replacement will help with my right leg/knee pain".     History of Present Illness:  History of Present Illness	  71 year old female with history of right hip OA presents today for presurgical evaluation.  PMHs includes HTN, depression, RLS on Methadone, allergic rhinitis, scoliosis s/p multiple spine surgeries (Bradford kaveh placed but removed many years later), hx of multiple PE's after Bradford Kaveh surgery in 1984  (was previously on Coumadin), OA s/p R TKR 2020 at [facility], developed postop COVID-19 infection (Dec 2020).  She had right knee arthroscopy June 2021 for continued knee pain.  She continues to complain of right hip/knee pain causing difficulty walking and climbing stairs.  She is scheduled for total hip arthroplasty with COURTNEY robot posterior approach on 1/6/22.     She denies any recent infection, fever, chills, chest pain, cough, wheezing and shortness of breath.      Preop Covid swab scheduled for 1/3/21 at [facility].     Allergies/Medications:   Allergies:        Allergies:  	Dilantin: Drug, Urticaria  	penicillins: Drug Category, Urticaria, angioedema    PAST MEDICAL HISTORY:  2019 novel coronavirus disease (COVID-19) Dec 2020- hospitalized for 3 days, did not require home O2, denies residual symptoms  Depression   Essential hypertension   Fungal infection of skin under breast  GERD (gastroesophageal reflux disease)   H/O scoliosis   H/O seasonal allergies   History of pulmonary embolism developed after bradford kaveh surgery,1984 treated with coumadin 3 months, no issues after  OA (osteoarthritis)   Osteoporosis   Restless leg syndrome   Right hip pain.     PAST SURGICAL HISTORY:  H/O arthroscopy of knee June 2021  H/O total knee replacement, right October 2020  Removal of pin, plate, kaveh, or screw 1991- removal of bradford kaveh  S/P cataract surgery   S/P dilatation and curettage 1981  S/P hip replacement left (2008)  S/P hysterectomy 1982  S/P repair of paraesophageal hernia 2001  S/P shoulder surgery right (2012)  S/P spinal fusion L4-L5 1966  S/P spinal surgery x 2 1985, 1986  Scoliosis s/p placement of bradford kaveh x 2 1984.    This is a 71 year old Female admitted to [facility] on 1/6/21 for an elective total hip arthroplasty.  Surgery was uncomplicated.  Patient evaluated and treated by PT, recommended for home.  Remain of hospital stay unremarkable, and patient discharged home when PT cleared.

## 2022-01-07 NOTE — OCCUPATIONAL THERAPY INITIAL EVALUATION ADULT - PERTINENT HX OF CURRENT PROBLEM, REHAB EVAL
70 yo F with history of right hip OA presents today for presurgical evaluation.  PMHs includes HTN, depression, RLS on Methadone, allergic rhinitis, scoliosis s/p multiple spine surgeries (Bradford kaveh placed but removed many years later), hx of multiple PE's after Bradford Kaveh surgery in 1984  (was previously on Coumadin), OA s/p R TKR 2020 at [facility], developed postop COVID-19 infection (Dec 2020) See below

## 2022-01-07 NOTE — CHART NOTE - NSCHARTNOTEFT_GEN_A_CORE
Note:    Received a call from nursing station on 2-Raoul  Patient discharged earlier today but was unable to arrive at her pharmacy on time  Rx canceled at 1st pharmacy and resent to pharmacy in [place]  Message left with patient      ***See Above  Ramon BALL  Orthopedics  B: [phone]  S: [phone]

## 2022-01-07 NOTE — PROGRESS NOTE ADULT - SUBJECTIVE AND OBJECTIVE BOX
Ortho Progress Note    S: Patient seen and examined. No acute events overnight. Pain well controlled with current regimen. Denies lightheadedness/dizziness, CP/SOB. Tolerating diet. Patient reports feeling well since her surgery      O:  Physical Exam:  Gen: Laying in bed, NAD, alert and oriented.   Resp: Unlabored breathing  Ext: RLE- dressing c/d/i, abduction pillow in place          EHL/FHL/TA/Sol intact          + SILT DP/SP/GONGORA/Sa/T          +DP, extremity WWP    Vital Signs Last 24 Hrs  T(C): 36.4 (07 Jan 2022 05:20), Max: 36.8 (06 Jan 2022 20:45)  T(F): 97.5 (07 Jan 2022 05:20), Max: 98.2 (06 Jan 2022 20:45)  HR: 76 (07 Jan 2022 05:20) (59 - 81)  BP: 101/65 (07 Jan 2022 05:20) (87/56 - 153/84)  BP(mean): 75 (06 Jan 2022 19:15) (75 - 96)  RR: 18 (07 Jan 2022 05:20) (15 - 18)  SpO2: 97% (07 Jan 2022 05:20) (92% - 100%)

## 2022-01-07 NOTE — DISCHARGE NOTE PROVIDER - NSDCFUADDINST_GEN_ALL_CORE_FT
Please follow up with Dr. Lance at your scheduled follow up appointment in 2 weeks (Call office to confirm appointment).  PT-weight bearing as tolerated right lower extremity with posterior hip precautions.  Eliquis twice daily x 4 weeks total for dvt prevention.  Keep dressing clean, dry and intact until date listed on dressing.  Have doctor remove any sutures (if applicable) at follow up visit.      Do not resume Prolia until cleared by your surgeon.    Please follow up with your PMD within 1 month for routine checkup.

## 2022-01-07 NOTE — PROGRESS NOTE ADULT - ASSESSMENT
ASSESSMENT/PLAN:   VIRGINIA HUFFMAN is a 71yFemale s/p R MAIK, now POD1    - Pain control  - WBAT LLE  - Posterior precautions/abduction pillow while in bed  - PT/OT/OOB  - DVT ppx: Eliquis 2.5 BID  - Dispo: pending full PT eval

## 2022-01-08 VITALS
SYSTOLIC BLOOD PRESSURE: 112 MMHG | HEART RATE: 59 BPM | OXYGEN SATURATION: 92 % | DIASTOLIC BLOOD PRESSURE: 69 MMHG | RESPIRATION RATE: 18 BRPM | TEMPERATURE: 98 F

## 2022-01-10 ENCOUNTER — APPOINTMENT (OUTPATIENT)
Dept: ENDOCRINOLOGY | Facility: CLINIC | Age: 72
End: 2022-01-10

## 2022-01-10 ENCOUNTER — NON-APPOINTMENT (OUTPATIENT)
Age: 72
End: 2022-01-10

## 2022-01-11 ENCOUNTER — NON-APPOINTMENT (OUTPATIENT)
Age: 72
End: 2022-01-11

## 2022-01-20 ENCOUNTER — APPOINTMENT (OUTPATIENT)
Dept: ORTHOPEDIC SURGERY | Facility: CLINIC | Age: 72
End: 2022-01-20
Payer: MEDICARE

## 2022-01-20 VITALS — HEIGHT: 60 IN | WEIGHT: 160 LBS | BODY MASS INDEX: 31.41 KG/M2

## 2022-01-20 PROCEDURE — 99024 POSTOP FOLLOW-UP VISIT: CPT

## 2022-01-20 PROCEDURE — 73502 X-RAY EXAM HIP UNI 2-3 VIEWS: CPT

## 2022-01-20 NOTE — PHYSICAL EXAM
[de-identified] : Well developed, well nourished in no apparent distress, awake, alert and orientated to person, place and time with appropriate mood and affect\par Respirations are even and unlabored. Gait evaluation does not reveal a limp. There is no inguinal adenopathy. The affected limb is well-perfused with palpable pedal pulse, without skin lesions, shows a grossly normal motor and sensory examination. Incision is CDI with staples. Hip motion is limited due to pain.  Leg lengths are approximately equal  [de-identified] : AP pelvis, AP hip, and lateral x-rays of the right hip were ordered and obtained in the office and demonstrate satisfactory position and alignment of the components are present. No signs of loosening are seen.

## 2022-01-20 NOTE — DISCUSSION/SUMMARY
[de-identified] : The patient is doing well after joint replacement surgery. Written infectious precautions were reviewed. The patient will progress with physical therapy at this time and they will work on transitioning from requiring assistive devices for ambulation. Eliquis therapy will be discontinued at 1 month post surgery for the purpose of orthopedic thromboembolism prophylaxis. She admits to taking it only 1x/day accidentally. Willl take it 2x/day going forward.  Return around the 6 week anniversary from surgery for follow-up evaluation. \par \par The patient returns to the office today for staple removal from the right hip wound. The staples have been in since surgery. The patient has no complaints. The wound is healing well and is without evidence of infection or wound dehiscence. The wound was prepped with betadine and a staple removal tool was used to remove all staples in their entirety. Steri strips were placed over the wound and the patient was instructed to leave these in place until they fall off on their own. The patient tolerated the procedure well and there were no immediate complications. The wound edges are well adhered. The patient was instructed to continue to keep it clean.

## 2022-01-20 NOTE — HISTORY OF PRESENT ILLNESS
[de-identified] : Status-post right total hip  arthroplasty here for initial postoperative evaluation. Still having more pain than usual. Pain is not well controlled with oral medications. There has been no change in medical health since discharge. The patient does require assistive devices.

## 2022-02-02 ENCOUNTER — APPOINTMENT (OUTPATIENT)
Dept: INTERNAL MEDICINE | Facility: CLINIC | Age: 72
End: 2022-02-02
Payer: MEDICARE

## 2022-02-02 DIAGNOSIS — Z23 ENCOUNTER FOR IMMUNIZATION: ICD-10-CM

## 2022-02-02 PROCEDURE — G0008: CPT

## 2022-02-02 PROCEDURE — 99213 OFFICE O/P EST LOW 20 MIN: CPT | Mod: 25

## 2022-02-02 PROCEDURE — 90662 IIV NO PRSV INCREASED AG IM: CPT

## 2022-02-03 VITALS — HEART RATE: 78 BPM | RESPIRATION RATE: 14 BRPM | SYSTOLIC BLOOD PRESSURE: 138 MMHG | DIASTOLIC BLOOD PRESSURE: 70 MMHG

## 2022-02-03 NOTE — HISTORY OF PRESENT ILLNESS
[de-identified] : Ms. HUFFMAN comes in for reassessment of hypertension. She denies edema, chest pain, dizziness, POLLARD, PND and orthopnea.  She  has had no problems with her medications.\par

## 2022-02-03 NOTE — PLAN
[FreeTextEntry1] : Goal blood pressure /90\par At goal\par Urged to limit sodium intake\par Urged to maintain diet and exercise habits to keep BMI < 28\par Limit alcohol consumption to < 7 drinks per week\par Reinforced adherence to medication regimen\par \par 24 minutes spent in preparation of this visit, including review of previous notes and test results, interview and examination of patient, discussion of plan, arranging for appropriate testing and treatment, and documentation.\par \par

## 2022-02-15 ENCOUNTER — APPOINTMENT (OUTPATIENT)
Dept: ENDOCRINOLOGY | Facility: CLINIC | Age: 72
End: 2022-02-15
Payer: MEDICARE

## 2022-02-15 VITALS
BODY MASS INDEX: 33.18 KG/M2 | TEMPERATURE: 98.2 F | HEIGHT: 60 IN | OXYGEN SATURATION: 96 % | DIASTOLIC BLOOD PRESSURE: 69 MMHG | WEIGHT: 169 LBS | SYSTOLIC BLOOD PRESSURE: 132 MMHG | HEART RATE: 95 BPM

## 2022-02-15 PROCEDURE — 99214 OFFICE O/P EST MOD 30 MIN: CPT | Mod: 25

## 2022-02-15 PROCEDURE — 96372 THER/PROPH/DIAG INJ SC/IM: CPT

## 2022-02-15 RX ORDER — OXYCODONE 5 MG/1
5 TABLET ORAL
Qty: 40 | Refills: 0 | Status: COMPLETED | COMMUNITY
Start: 2022-02-09 | End: 2022-02-15

## 2022-02-15 RX ORDER — OXYCODONE 5 MG/1
5 TABLET ORAL
Qty: 40 | Refills: 0 | Status: COMPLETED | COMMUNITY
Start: 2022-01-25 | End: 2022-02-15

## 2022-02-15 NOTE — HISTORY OF PRESENT ILLNESS
[Prolia (Denosumab)] : Prolia (Denosumab) [Premat. Menopause (surg)] : premature menopause which occurred surgically [High Fall Risk] : high fall risk [Frequent Falls] : frequent falls [Uses a Walker/Cane] : use of a walker/cane [Regular Dental Follow-Up] : regular dental follow-up [History of Blood Clots] : history of blood clots [FreeTextEntry1] : 72-year-old F with PMH osteoporosis, thyroid nodules, prediabetes presents for follow up.\par \par Osteoporosis: Seen in 2013 for osteoporosis. Told of osteopenia approximately in 2005. Was treated from 5172-8517 with Fosamax and then stopped due to esophagitis. DXA 2013 stable - drug holiday continued. No history of fractures. Mother did not have hip fx. Patient smoked 1-2 PPD x 20 yrs - quit 1982. No prolonged steroid use. No recent steroids. Premature menopause at age 33 s/p RAJIV for fibroids. She has a history of several orthopedic procedures including spinal fusion 1966 and multiple further spinal surgeries. History of left hip replacement 2010 and right shoulder replacement 2012 for arthritis.  Taking Vit D 2000 IU QD. Now taking calcium. Seen initially by me 5/2/16 with repeat DXA with significant decline in BMD of 1/3 radius from -2.4 in 2013 to -3.4. PTH not elevated, but parathyroid adenoma possible visualized on thyroid U/S. Recommended prolia s/p 1st injection 5/24/16, 2nd injection 11/2016. 3rd injection 6/2017. 4th injection 12/18/17. 5th injection 7/2/18. 6th injection 1/2019. 7th injection 9/2019. Was due for 8th injection 3/2020, but postponed due to COVID. 8th injection given 1/2021.  9th injection 7/2021. Here for 10th injection. No recent fractures, but has fallen since last visit. Follows with dental. No upcoming dental work. Has hx of dental implants. Last DXA 6/2017 total spine -2.1, 1/3 radius -3.1, Hip -2.1. Last DXA 1/2021 stable.\par Had right hip replacement 1/2022. \par \par B/L Thyroid Nodules: Outpatient Thyroid US performed 3/2016 revealed B/L thyroid nodules, with heterogenous, hypoechoic solid nodule of the L midpole measuring 1.5 x 1.1 x 0.8cm;  heterogenous, hypoechoic solid nodule of the R mid-lower pole measuring 1.3 x 0.8 x 1.1 cm; and  heterogenous, hypoechoic solid nodule of the RLL measuring 1.3 x 1.4 x 1.0 cm.  Nodules were found incidentally on work up for falls. No personal h/o radiation to the head or neck. No h/o abnormal thyroid blood work. No voice changes. + dysphagia after paraesophageal hernia repair. Father had thyroid nodule - unknown if malignant. Seen initially 5/2016 with thyroid U/S confirming dominant left thyroid nodule. Recommended FNA performed 5/2016 with benign results. Repeat thyroid US 1/2019 stable. Patient reports occasional hand tremors. Denies palpitations. Denies heat or cold intolerance. +weight loss and constipation from methadone. No h/o of amiodarone or lithium use. No changes to the neck. \par \par On chronic methadone for restless leg syndrome now started ropinirole.  [Taking Steroids] : no past or present history of taking steroids [Kidney Stones] : no history of kidney stones [Excessive Alcohol Intake] : no past or present history of excessive alcohol intake [Current Smoking___(ppd)] : not currently smoking [Family History of Osteoporosis] : no family history of osteoporosis [Family History of Breast Cancer] : no family history of breast cancer [Family History of Hip Fracture] : no family history of hip fracture [Hyperparathyroidism] : no hyperparathyroidism [History of Radiation Therapy] : no history of radiation therapy [Previous Fragility Fracture] : no previous fragility fracture

## 2022-02-15 NOTE — DATA REVIEWED
[FreeTextEntry1] : Labs 1/2022:\par Ca 8.0\par \par Focused Thyroid US 8/10/2021: Right Upper Nodule 1.42 x 1.21 x 0.78; Right Lower Nodule 0.87 x 0.83 x 0.86; Right Parathyroid 1.16 x 0.73 x 0.84; Left Nodule 1.28 x 0.77 x 1.05 with coarse calcification. Additional b/l subcentimeter cysts and nodules too numerous to characterize. \par \par Labs 7/2021:\par CMP normal except glucose of 107\par Vit D 26.6\par \par Labs 10/2019:\par CBC normal\par \par HDL 63\par Chol 242\par Trig 188\par CMP normal\par TSH 1.67\par A1c 5.7%\par \par Labs 3/2019:\par \par \par Thyroid Ultrasound Report 1/7/19:\par \par Technique: multiple real time longitudinal and transverse images were obtained using a high resolution ultrasound with a linear transducer, [employer] e 2008 model, 10-12 MHz frequencies. All measurements will be reported as longitudinal x sal-posterior x transverse. \par Comparison: Report dated 5/2016. \par \par Indication: thyroid nodule. \par \par Findings: \par The right thyroid lobe measures 3.66 x 1.33 x 1.64 cm. The left thyroid lobe measures 4.12 x 1.21 x 1.68 cm. The isthmus measures 0.25 cm. \par The right thyroid lobe has a heterogeneous parenchyma. \par The left thyroid lobe has a heterogeneous parenchyma . \par  \par In the right mid pole there is a  mixed, heterogenous nodule. It measures 1.33 x 0.77 x 1.17 cm. The nodule is ovoid in shape and the border is distinct. It has peripheral vascularity. \par \par In the right lower pole there is a  mixed. It measures 0.95 x 0.81 x 0.96 cm. The nodule is ovoid in shape and the border is distinct. It has peripheral vascularity. \par \par In the left mid pole there is a  mixed, heterogenous nodule. It measures 1.11 x 1.03 x 0.94 cm. The nodule is ovoid in shape and the border is distinct. It has peripheral and scant internal vascularity. \par \par In the left mid pole there is a  hypoechoic nodule. It measures 0.65 x 0.43 x 0.87 cm. The nodule is ovoid in shape and the border is smooth. The nodule does not have a halo. It demonstrates no calcifications. It has no vascularity. parathyroid adenoma posterior to R lobe 1.18 x 1.08 x 0.96 cm. \par \par In the right upper pole there is a  cyst. It measures 0.75 x 0.56 x 0.68 cm. The nodule is round in shape and the border is smooth. The nodule does not have a halo. It demonstrates no calcifications. It has no vascularity. \par \par Additional bilateral subcentimeter cysts and nodules too numerous to characterize. \par \par \par Labs 9/2018:\par CBC normal\par CMP normal\par A1c 5.8%\par Ca 10\par Corrected Ca 9.5\par \par HDL 61\par Choll 232\par Trig 98\par Vit D 43.2\par Mg 1.9\par \par Labs 7/2018:\par CBC normal\par A1c 5.7%\par CMP normal\par Ca 10\par Corrected 9.6\par PTH 46\par TSH 2.26\par Free T4 1.4\par Phos 2.8\par Vit D 39.8\par Vit D 1,25 37.3\par \par Labs 12/5/17:\par CBC normal\par Mg 1.9\par Glucose 112\par Ca 10\par PTH 36\par CMP otherwise normal\par Phos 3.5\par Chol 228\par Trig 243\par HDL 59\par \par TSH 1.67\par Free T4 1.2\par A1c 5.6%\par Vit D 30.9\par \par \par \par Labs 4/2017:\par Glucose 107\par \par HDL 76\par Chol 245\par Trig 205\par Vit D 31.8\par A1c 6.1%\par \par Labs 11/2016:\par TSH 1.26\par Free T4 1.3\par CBC normal\par A1c 6.0%\par CMP normal except glucose of 115\par \par \par FNA 5/31/16: Benign\par \par Labs 5/2/16:\par PTH 33\par Ca 10.2\par TSH 1.36\par Free T4 1.3\par \par Thyroid US performed 5/2/16:\par \par Indication: thyroid nodule. \par \par Findings: \par The right thyroid lobe measures 3.63 x 1.12 x 1.71 cm. The left thyroid lobe measures 3.73x 1.22 x 1.72 cm. The isthmus measures 0.29 cm. \par  \par In the right lower pole there is a  mixed, heterogenous nodule. It measures 1.29 x 0.71 x 1.1 cm. The nodule is ovoid in shape and the border is distinct. It has peripheral vascularity. \par \par In the right lower pole there is a  mixed. It measures 0.83 0.52 x 0.70 cm. The nodule is ovoid in shape and the border is distinct. It has peripheral vascularity. \par \par In the left lower pole there is a  mixed, heterogenous nodule. It measures 1.38 x 1.02 x 0.82 cm. The nodule is ovoid in shape and the border is distinct. It has peripheral and scant internal vascularity. \par \par parathyroid adenoma posterior to R lobe 1.18 x 1.08 x 0.96 cm. \par \par DEXA performed May 2,2016\par spine not performed due to surgery\par Tot Hip -2.2 (+4.9% change from 2013)\par Fem Neck -2.3 Z(-5.2 % change from 2013)\par Proximal radius -3.4 (-11.3% change from 2013)\par \par DEXA performed September 10, 2013\par spine not performed due to surgery\par Total Hipt -2.5, osteoporosis, stable versus prior study of 2011 (-2.7)\par femoral neck -2.0, osteopenia, stable versus 2011 (-2.3)\par Proximal radius -2.4, osteopenia, no prior

## 2022-02-15 NOTE — ASSESSMENT
[Carbohydrate Consistent Diet] : carbohydrate consistent diet [Importance of Diet and Exercise] : importance of diet and exercise to improve glycemic control, achieve weight loss and improve cardiovascular health [FreeTextEntry1] : 72-year-old F w/  \par \par 1. B/L Thyroid nodules - Thyroid FNA of Left nodule (please refer to official US report) benign. Repeat focused thyroid U/S performed 8/2021 stable compared to 1/2019 and stable compared to 2016 (see results section for full description). Clinically euthyroid. Repeat thyroid US 8/2022.   \par \par 2. Osteoporosis -  DXA repeated 6/2017 with results as noted above with stable/mild improvement in proximal radius T score of -3.1 from -3.4 (-11.3% change from 2013). s/p drug holiday for ~ 7 years. Started on prolia 1st dose 5/24/16. 2nd dose 11/29/16. 3rd Prolia 6/2017. 4th dose 12/2017. 5th dose 7/2018. 6th dose 1/2019. 7th dose of Prolia given 9/2019 (BUY AND BILL). 8th injection 1/2021. 9th injection 7/2021. 10th injection due today, but labs from 1/2022 around the time of her hip surgery revealed a calcium of 8.0. Will repeat calcium today to avoid risk of hypocalcemia from Prolia. If calcium normalized will have patient return tomorrow for Prolia injection. Last DEXA 1/2021 generally stable with improvement in the femoral neck. Next DXA to be done 1/2023.\par \par 3. Prediabetes- A1c 5.7%. Lifestyle modification advised.  \par \par 4. HTN- c/w chlorthalidone and losartan\par \par Prep time with review of hospital labs and interval progress notes and consultations\par Discussion with patient regarding thyroid nodules and osteoporosis treatment options and goals of care answering all patients questions and addressing all concerns \par Multiple thyroid nodules assessed and discussed with the patient at the time of the visit\par Post-visit completion charting and review\par Total Time 30 min\par \par Specifically causes, evaluation, treatment options, risks, complications, and benefits of available therapies were discussed. Questions were answered.\par \par The submitted E/M billing level for this visit reflects the total time spent on the day of the visit including face-to-face time spent with the patient, non-face-to-face review of medical records and relevant information, documentation, and asynchronous communication with the patient after a visit via phone, email, or patient’s EHR portal after the visit. \par The medical records reviewed are either scanned into the chart or reviewed with the patient using a patient’s electronic medical records portal for patients with records not available to [facility] via electronic transmission platforms from other institutions and labs. \par Time spend counseling and performing coordination of care was also included in determining the appropriate EM billing level.\par \par I have reviewed and verified information regarding the chief complaint and history recorded by the ancillary staff and/or the patient. I have independently reviewed and interpreted tests performed by other physicians and facilities as necessary. \par \par I have discussed with the patient differential diagnosis, reason for auxiliary tests if ordered, risks, benefits, alternatives, and complications of each form of therapy were discussed. \par \par \par

## 2022-02-15 NOTE — PROCEDURE
[[employer] e 2008 model, 10-12 MHz frequencies] : multiple real time longitudinal and transverse images were obtained using a high resolution ultrasound with a linear transducer, [employer] e 2008 model, 10-12 MHz frequencies. All measurements will be reported as longitudinal x sal-posterior x transverse. [Report dated ___] : Report dated [unfilled] [Thyroid Nodule] : thyroid nodule [] : a heterogeneous parenchyma  [Lower] : lower pole there is a  [Peripheral vascularity] : peripheral vascularity [Mixed] : mixed [Heterogeneous] : heterogenous nodule [Distinct] : distinct [Peripheral and scant  internal vascularity] : peripheral and scant internal vascularity [Left Thyroid] : left [Mid] : mid pole there is a  [Hypoechoic] : hypoechoic nodule [Ovoid] : ovoid in shape [Right Thyroid] : right [Upper] : upper pole there is a  [Cyst] : cyst [Round] : round in shape [Smooth] : smooth [No] : does not have a halo [No calcifications] : no calcifications [No vascularity] : no vascularity [FreeTextEntry1] : 3.66 x 1.33 x 1.64 [FreeTextEntry5] : 4.12 x 1.21 x 1.68 [FreeTextEntry2] : 0.25 [FreeTextEntry4] : parathyroid adenoma posterior to R lobe 1.18 x 1.08 x 0.96 cm [FreeTextEntry3] : 0.75 x 0.56 x 0.68 [de-identified] : Additional bilateral subcentimeter cysts and nodules too numerous to characterize.

## 2022-02-15 NOTE — PHYSICAL EXAM
[Alert] : alert [Well Nourished] : well nourished [Healthy Appearance] : healthy appearance [No Acute Distress] : no acute distress [Normal Voice/Communication] : normal voice communication [EOMI] : extra ocular movement intact [No Proptosis] : no proptosis [Supple] : the neck was supple [No Respiratory Distress] : no respiratory distress [No Accessory Muscle Use] : no accessory muscle use [Normal Rate and Effort] : normal respiratory rate and effort [Clear to Auscultation] : lungs were clear to auscultation bilaterally [Normal S1, S2] : normal S1 and S2 [Normal Rate] : heart rate was normal [Regular Rhythm] : with a regular rhythm [No Edema] : no peripheral edema [Normal Bowel Sounds] : normal bowel sounds [Not Tender] : non-tender [Not Distended] : not distended [Soft] : abdomen soft [Kyphosis] : kyphosis present [No Stigmata of Cushings Syndrome] : no stigmata of Cushings Syndrome [No Involuntary Movements] : no involuntary movements were seen [Oriented x3] : oriented to person, place, and time [Normal Affect] : the affect was normal [Normal Mood] : the mood was normal [de-identified] : +nodular thyroid gland

## 2022-02-16 ENCOUNTER — APPOINTMENT (OUTPATIENT)
Dept: ENDOCRINOLOGY | Facility: CLINIC | Age: 72
End: 2022-02-16

## 2022-02-16 VITALS
SYSTOLIC BLOOD PRESSURE: 144 MMHG | OXYGEN SATURATION: 96 % | WEIGHT: 169 LBS | HEART RATE: 82 BPM | BODY MASS INDEX: 33.18 KG/M2 | HEIGHT: 60 IN | RESPIRATION RATE: 18 BRPM | TEMPERATURE: 98.2 F | DIASTOLIC BLOOD PRESSURE: 73 MMHG

## 2022-02-16 LAB
ALBUMIN SERPL ELPH-MCNC: 4.5 G/DL
ALP BLD-CCNC: 143 U/L
ALT SERPL-CCNC: 12 U/L
ANION GAP SERPL CALC-SCNC: 12 MMOL/L
AST SERPL-CCNC: 19 U/L
BILIRUB SERPL-MCNC: 0.3 MG/DL
BUN SERPL-MCNC: 25 MG/DL
CALCIUM SERPL-MCNC: 10.2 MG/DL
CALCIUM SERPL-MCNC: 10.2 MG/DL
CHLORIDE SERPL-SCNC: 102 MMOL/L
CO2 SERPL-SCNC: 28 MMOL/L
CREAT SERPL-MCNC: 1 MG/DL
GLUCOSE SERPL-MCNC: 106 MG/DL
PARATHYROID HORMONE INTACT: 36 PG/ML
PHOSPHATE SERPL-MCNC: 3.8 MG/DL
POTASSIUM SERPL-SCNC: 4.8 MMOL/L
PROT SERPL-MCNC: 7.1 G/DL
SODIUM SERPL-SCNC: 141 MMOL/L
T4 FREE SERPL-MCNC: 1.3 NG/DL
TSH SERPL-ACNC: 1.35 UIU/ML

## 2022-02-24 ENCOUNTER — APPOINTMENT (OUTPATIENT)
Dept: ORTHOPEDIC SURGERY | Facility: CLINIC | Age: 72
End: 2022-02-24
Payer: MEDICARE

## 2022-02-24 PROCEDURE — 99024 POSTOP FOLLOW-UP VISIT: CPT

## 2022-02-24 NOTE — HISTORY OF PRESENT ILLNESS
[de-identified] : Status-post right total hip  arthroplasty here for routine postoperative evaluation. Still having more pain than usual. Pain is not well controlled with oral medications. There has been no change in medical health since discharge. The patient does not require assistive devices.

## 2022-02-24 NOTE — PHYSICAL EXAM
[de-identified] : Well developed, well nourished in no apparent distress, awake, alert and orientated to person, place and time with appropriate mood and affect\par Respirations are even and unlabored. Gait evaluation does not reveal a limp. There is no inguinal adenopathy. The affected limb is well-perfused with palpable pedal pulse, without skin lesions, shows a grossly normal motor and sensory examination. Incision is well healed. Hip motion is painless.  Leg lengths are approximately equal

## 2022-02-24 NOTE — DISCUSSION/SUMMARY
[de-identified] : The patient is doing well after joint replacement surgery. Continue wbat. Posterior hip precautions.  Return around the 6 monthanniversary from surgery for follow-up evaluation.

## 2022-02-28 ENCOUNTER — TRANSCRIPTION ENCOUNTER (OUTPATIENT)
Age: 72
End: 2022-02-28

## 2022-03-07 ENCOUNTER — APPOINTMENT (OUTPATIENT)
Dept: OTOLARYNGOLOGY | Facility: CLINIC | Age: 72
End: 2022-03-07
Payer: MEDICARE

## 2022-03-07 ENCOUNTER — NON-APPOINTMENT (OUTPATIENT)
Age: 72
End: 2022-03-07

## 2022-03-07 VITALS
HEIGHT: 64 IN | WEIGHT: 165 LBS | SYSTOLIC BLOOD PRESSURE: 140 MMHG | DIASTOLIC BLOOD PRESSURE: 73 MMHG | BODY MASS INDEX: 28.17 KG/M2 | TEMPERATURE: 98.3 F

## 2022-03-07 DIAGNOSIS — J34.3 HYPERTROPHY OF NASAL TURBINATES: ICD-10-CM

## 2022-03-07 DIAGNOSIS — H90.3 SENSORINEURAL HEARING LOSS, BILATERAL: ICD-10-CM

## 2022-03-07 DIAGNOSIS — M26.629 ARTHRALGIA OF TEMPOROMANDIBULAR JOINT,: ICD-10-CM

## 2022-03-07 PROCEDURE — 92567 TYMPANOMETRY: CPT

## 2022-03-07 PROCEDURE — 92557 COMPREHENSIVE HEARING TEST: CPT

## 2022-03-07 PROCEDURE — 99204 OFFICE O/P NEW MOD 45 MIN: CPT

## 2022-03-07 NOTE — ASSESSMENT
[FreeTextEntry1] : Patient here for left otalgia.  No evidence of infection.  FIndings consistent with TMJ issues - recommended warm compresses and NSAIDS and soft foods.  No gum chewing  Also discussed possible dental eval and possible oral appliance if problems persist.\par Patient also requested audio - has bilat hfsnhl - no treatment at this time but would recommend yearly audio.    Note no seal on tymp on left but no tm perf visualized.

## 2022-03-07 NOTE — REASON FOR VISIT
[Initial Consultation] : an initial consultation for [FreeTextEntry2] : ear pain/ hearing test request

## 2022-03-07 NOTE — HISTORY OF PRESENT ILLNESS
[de-identified] : c/o pain in left pain.  Worse at night.  Feels ache in ear.  No prior ear problems.  Occ pnd.  No gum chewing.  ?grinds teeth.   ? change in hearing.   Occ slight nasal congestion

## 2022-03-07 NOTE — DATA REVIEWED
[de-identified] : Mild loss at 1000 Hz, bilaterally  and mild to moderately-severe SNHL 9532-0933 right ear and mild to severe SNHL 1750-5808 Hz left ear\par Type A right ear\par unable to seal left ear for Tymp

## 2022-03-07 NOTE — PHYSICAL EXAM
[Midline] : trachea located in midline position [Normal] : no rashes [] : septum deviated bilaterally [de-identified] : very tender left tmj  [de-identified] : xw cerumen left ear canal ; right normal  [de-identified] : mil dinferior turbinate hypertrophy

## 2022-03-07 NOTE — REVIEW OF SYSTEMS
[Post Nasal Drip] : post nasal drip [Ear Pain] : ear pain [Hearing Loss] : hearing loss [Dizziness] : dizziness [Throat Clearing] : throat clearing [Negative] : Heme/Lymph [de-identified] : l

## 2022-03-16 ENCOUNTER — NON-APPOINTMENT (OUTPATIENT)
Age: 72
End: 2022-03-16

## 2022-03-19 ENCOUNTER — TRANSCRIPTION ENCOUNTER (OUTPATIENT)
Age: 72
End: 2022-03-19

## 2022-05-02 ENCOUNTER — APPOINTMENT (OUTPATIENT)
Dept: ORTHOPEDIC SURGERY | Facility: CLINIC | Age: 72
End: 2022-05-02
Payer: MEDICARE

## 2022-05-02 VITALS — WEIGHT: 172 LBS | BODY MASS INDEX: 29.37 KG/M2 | HEIGHT: 64 IN

## 2022-05-02 DIAGNOSIS — M53.3 SACROCOCCYGEAL DISORDERS, NOT ELSEWHERE CLASSIFIED: ICD-10-CM

## 2022-05-02 DIAGNOSIS — M70.62 TROCHANTERIC BURSITIS, LEFT HIP: ICD-10-CM

## 2022-05-02 PROCEDURE — 73564 X-RAY EXAM KNEE 4 OR MORE: CPT | Mod: LT

## 2022-05-02 PROCEDURE — 99214 OFFICE O/P EST MOD 30 MIN: CPT | Mod: 25

## 2022-05-02 PROCEDURE — 20610 DRAIN/INJ JOINT/BURSA W/O US: CPT | Mod: LT

## 2022-05-02 PROCEDURE — 73522 X-RAY EXAM HIPS BI 3-4 VIEWS: CPT

## 2022-05-02 NOTE — PHYSICAL EXAM
[de-identified] : Patient is well nourished, well-developed, in no acute distress, with appropriate mood and affect. The patient is oriented to time, place, and person. Respirations are even and unlabored. Gait evaluation does not reveal a limp. There is no inguinal adenopathy. Examination of the contralateral hip shows normal range of motion, strength, no tenderness, and intact skin. The affected limb is well-perfused and showed 2+ dp/pt pulses, without skin lesions, shows a grossly normal motor and sensory examination. Examination of the hip shows a well healed surgical scar. Hip motion is full and painless from 0-90 degrees extension to flexion, 20 degrees adduction and 20 degrees abduction, and 15 degrees internal and 30 degrees external rotation. Leg lengths are approximately equal. Both hips are stable and muscle strength is normal with good strength with resisted abduction and adduction. Pedal pulses are palpable.  Tender to palpation about the trochanteric bursa.  Tender to palpation of the sacroiliac joint.  Left knee motion is significantly reduced and does cause significant pain. The knee moves from 0-115 degrees. The knee is stable within that range-of-motion to AP and ML stress. The alignment of the knee is 5 degrees varus. Muscle strength is normal. Pedal pulses are palpable. [de-identified] : AP pelvis, AP hip, and lateral x-rays of the left hip were ordered and obtained in the office and demonstrate satisfactory position and alignment of the components are present. No signs of loosening are seen.  AP radiograph of the right hip was also ordered and obtained the office demonstrates a well-functioning right total of arthroplasty.\par \par Long standing knee, AP knee, lateral knee, and patellar views of the left knee were ordered and taken in the office and demonstrate degenerative joint disease of the knee with joint space narrowing, osteophyte formation, and subchondral sclerosis.

## 2022-05-02 NOTE — REASON FOR VISIT
[Follow-Up Visit] : a follow-up visit for [Artificial Hip Joint] : artificial hip joint [Hip Pain] : hip pain [Osteoarthritis, Knee] : osteoarthritis, knee [Radiculopathy] : radiculopathy

## 2022-05-02 NOTE — HISTORY OF PRESENT ILLNESS
[de-identified] : This is very nice 72-year-old female experiencing left buttock and left lateral hip and left knee pain, which is severe in intensity.  She did extremely well from a right total of arthroplasty I performed for her.  She had another surgeon performed left total hip arthroplasty.  She has known left knee osteoarthritis and sacroiliitis.  The pain substantially limits activities of daily living. Walking tolerance is reduced. Medication and activity modification have been minimally effective for a period lasting greater than three months in duration. Assistive devices and external support were not deemed by the patient to be helpful in improving their function. Due to the severity of osteoarthritis and level of pain, physical therapy is contraindicated. Pain and restriction of function are intolerable at this time. The patient denies any radiation of the pain to the feet and it is not associated with numbness, tingling, or weakness.  Most of her pain is in the left buttock.

## 2022-05-02 NOTE — DISCUSSION/SUMMARY
[de-identified] : This patient has left hip trochanteric bursitis, left hip sacroiliitis as well as left knee osteoarthritis.  The patient is not an appropriate candidate for surgical intervention at this time. An extensive discussion was conducted on the natural history of the disease and the variety of surgical and non-surgical options available to the patient including, but not limited to non-steroidal anti-inflammatory medications, steroid injections, physical therapy, maintenance of ideal body weight, and reduction of activity.  Today we performed a left hip trochanteric bursa injection.  Follow-up in 1 week for left knee intra-articular cortisone injection.  Referred to physiatry for left hip sacroiliac joint injection.\par The patient will schedule an appointment as needed.\par \par Informed consent for the left hip trochanteric bursa injection was obtained. All questions were answered. A time out was performed. The left lateral hip was prepped and draped in sterile fashion. Using sterile technique, the left greater trochanter was injection with 80mg of Kenalog and 4cc of 0.25% marcaine using a 21-gauge needle. A sterile dressing was applied. Post injection instructions were reviewed. The patient reported relief of symptoms after the injection. The patient tolerated the procedure well.\par

## 2022-05-09 ENCOUNTER — APPOINTMENT (OUTPATIENT)
Dept: ORTHOPEDIC SURGERY | Facility: CLINIC | Age: 72
End: 2022-05-09
Payer: MEDICARE

## 2022-05-09 PROCEDURE — 99214 OFFICE O/P EST MOD 30 MIN: CPT | Mod: 25

## 2022-05-09 PROCEDURE — 20610 DRAIN/INJ JOINT/BURSA W/O US: CPT | Mod: LT

## 2022-05-09 NOTE — HISTORY OF PRESENT ILLNESS
[de-identified] : This is a very nice  72 year old  female experiencing worsening pain in the left knee which is severe in intensity and has been going on for at least 3 months now. The pain substantially limits activities of daily living. Walking tolerance is reduced. Medication and activity modification have been minimally effective for a period lasting greater than three months in duration. Assistive devices and external support were not deemed by the patient to be helpful in improving their function. The patient denies any radiation of the pain to the feet and it is not associated with numbness, tingling, or weakness.

## 2022-05-09 NOTE — PHYSICAL EXAM
[de-identified] : Well developed, well nourished in no apparent distress, awake, alert and orientated to person, place and time. with appropriate mood and affect. \par Respirations are even and unlabored. Gait evaluation does reveal a limp. There is no inguinal adenopathy. \par The affected limb is well-perfused, without skin lesions, shows a grossly normal motor and sensory examination. \par Knee motion does cause pain. ROM of the knee is 0-115 degrees.  5 degrees varus\par The knee is stable within that range-of-motion to AP and ML stress. \par Muscle strength is normal. Pedal pulses are palpable.

## 2022-05-09 NOTE — DISCUSSION/SUMMARY
[de-identified] : The patient has left knee osteoarthritis. They are not an appropriate candidate for surgical intervention at this time. An extensive discussion was conducted on the natural history of the disease and the variety of surgical and non-surgical options available to the patient including, but not limited to non-steroidal anti-inflammatory medications, steroid injections, physical therapy, maintenance of ideal body weight, and reduction of activity. I recommended a prescription of Mobic but the patient would prefer to use OTC NSAIDs at this time. The patient will schedule an appointment as needed. \par \par Informed consent for the left knee injection was obtained. All questions were answered. A time out was performed. The  knee was prepped and draped in sterile fashion. Using sterile technique, 80mg of Kenalog, 4cc of 1% lidocaine, 4cc of 0.25% marcaine using a 21-gauge needle. A sterile dressing was applied. Post injection instructions were reviewed. The patient tolerated the procedure well.

## 2022-05-16 ENCOUNTER — RX RENEWAL (OUTPATIENT)
Age: 72
End: 2022-05-16

## 2022-05-21 ENCOUNTER — RX RENEWAL (OUTPATIENT)
Age: 72
End: 2022-05-21

## 2022-06-28 ENCOUNTER — RX RENEWAL (OUTPATIENT)
Age: 72
End: 2022-06-28

## 2022-07-08 ENCOUNTER — APPOINTMENT (OUTPATIENT)
Dept: INTERNAL MEDICINE | Facility: CLINIC | Age: 72
End: 2022-07-08

## 2022-07-20 ENCOUNTER — APPOINTMENT (OUTPATIENT)
Dept: INTERNAL MEDICINE | Facility: CLINIC | Age: 72
End: 2022-07-20

## 2022-07-20 PROCEDURE — G0439: CPT

## 2022-07-20 PROCEDURE — G0444 DEPRESSION SCREEN ANNUAL: CPT

## 2022-07-21 VITALS — RESPIRATION RATE: 14 BRPM | HEART RATE: 78 BPM | SYSTOLIC BLOOD PRESSURE: 132 MMHG | DIASTOLIC BLOOD PRESSURE: 74 MMHG

## 2022-07-21 RX ORDER — LINACLOTIDE 72 UG/1
72 CAPSULE, GELATIN COATED ORAL
Qty: 30 | Refills: 4 | Status: DISCONTINUED | COMMUNITY
Start: 2021-11-03 | End: 2022-07-21

## 2022-07-21 RX ORDER — TRAMADOL HYDROCHLORIDE 50 MG/1
50 TABLET, COATED ORAL
Qty: 40 | Refills: 0 | Status: DISCONTINUED | COMMUNITY
Start: 2022-01-18 | End: 2022-07-21

## 2022-07-21 RX ORDER — TRAMADOL HYDROCHLORIDE 50 MG/1
50 TABLET, COATED ORAL
Qty: 40 | Refills: 0 | Status: DISCONTINUED | COMMUNITY
Start: 2022-01-26 | End: 2022-07-21

## 2022-07-21 RX ORDER — ROPINIROLE 2 MG/1
2 TABLET, FILM COATED ORAL TWICE DAILY
Qty: 60 | Refills: 0 | Status: DISCONTINUED | COMMUNITY
Start: 2021-06-13 | End: 2022-07-21

## 2022-07-21 RX ORDER — TRAMADOL HYDROCHLORIDE 50 MG/1
50 TABLET, COATED ORAL
Qty: 40 | Refills: 0 | Status: DISCONTINUED | COMMUNITY
Start: 2022-02-09 | End: 2022-07-21

## 2022-07-21 RX ORDER — OXYCODONE 5 MG/1
5 TABLET ORAL
Qty: 40 | Refills: 0 | Status: DISCONTINUED | COMMUNITY
Start: 2022-01-18 | End: 2022-07-21

## 2022-07-21 RX ORDER — LIDOCAINE 5% 700 MG/1
5 PATCH TOPICAL
Qty: 3 | Refills: 3 | Status: DISCONTINUED | COMMUNITY
Start: 2020-08-26 | End: 2022-07-21

## 2022-07-21 RX ORDER — APIXABAN 2.5 MG/1
2.5 TABLET, FILM COATED ORAL
Qty: 60 | Refills: 0 | Status: DISCONTINUED | COMMUNITY
Start: 2022-01-07 | End: 2022-07-21

## 2022-07-21 RX ORDER — NAPROXEN 500 MG/1
500 TABLET ORAL
Qty: 28 | Refills: 0 | Status: DISCONTINUED | COMMUNITY
Start: 2022-02-01 | End: 2022-07-21

## 2022-07-21 NOTE — HEALTH RISK ASSESSMENT
[Excellent] : ~his/her~  mood as  excellent [Former] : Former [Yes] : Yes [Monthly or less (1 pt)] : Monthly or less (1 point) [3 or 4 (1 pt)] : 3 or 4  (1 point) [Never (0 pts)] : Never (0 points) [No] : In the past 12 months have you used drugs other than those required for medical reasons? No [No falls in past year] : Patient reported no falls in the past year [0] : 2) Feeling down, depressed, or hopeless: Not at all (0) [PHQ-2 Negative - No further assessment needed] : PHQ-2 Negative - No further assessment needed [Audit-CScore] : 2 [[id number]] : 0 [Change in mental status noted] : No change in mental status noted [Language] : denies difficulty with language [Behavior] : denies difficulty with behavior [Learning/Retaining New Information] : denies difficulty learning/retaining new information [Handling Complex Tasks] : denies difficulty handling complex tasks [Reasoning] : denies difficulty with reasoning [Spatial Ability and Orientation] : denies difficulty with spatial ability and orientation [None] : None [With Family] : lives with family [Retired] : retired [] :  [Feels Safe at Home] : Feels safe at home [Fully functional (bathing, dressing, toileting, transferring, walking, feeding)] : Fully functional (bathing, dressing, toileting, transferring, walking, feeding) [Fully functional (using the telephone, shopping, preparing meals, housekeeping, doing laundry, using] : Fully functional and needs no help or supervision to perform IADLs (using the telephone, shopping, preparing meals, housekeeping, doing laundry, using transportation, managing medications and managing finances) [Reports changes in hearing] : Reports no changes in hearing [Reports changes in vision] : Reports no changes in vision [Reports normal functional visual acuity (ie: able to read med bottle)] : Reports normal functional visual acuity [Reports changes in dental health] : Reports no changes in dental health [Smoke Detector] : smoke detector [Seat Belt] :  uses seat belt

## 2022-07-21 NOTE — HISTORY OF PRESENT ILLNESS
[FreeTextEntry1] : Ms. HUFFMAN is here for an annual physical examination and assessment.\par  [de-identified] : She generally feels well with no specific complaints. Her recent health has been good.\par Troubled by urge incontinence. Denies dysuria, frequency, urethral discharge,  hematuria, hesitancy, flank pain, \par \par Denies headache, dizziness.\par Denies rash, fatigue, fever, weight loss, anorexia.\par Denies cough, wheezing.\par Denies CP, SOB, POLLARD, edema, palpitations.\par Denies abdominal pain, N/V/D/C. Denies BRBPR, melena, dysphagia.\par Denies dysuria, frequency, hematuria, hesitancy.\par Denies weakness, numbness, gait instability.\par \par \par

## 2022-07-22 LAB
ALBUMIN SERPL ELPH-MCNC: 4.4 G/DL
ALP BLD-CCNC: 129 U/L
ALT SERPL-CCNC: 17 U/L
ANION GAP SERPL CALC-SCNC: 14 MMOL/L
AST SERPL-CCNC: 22 U/L
BASOPHILS # BLD AUTO: 0.05 K/UL
BASOPHILS NFR BLD AUTO: 0.7 %
BILIRUB SERPL-MCNC: 0.4 MG/DL
BUN SERPL-MCNC: 22 MG/DL
CALCIUM SERPL-MCNC: 9.3 MG/DL
CHLORIDE SERPL-SCNC: 94 MMOL/L
CHOLEST SERPL-MCNC: 227 MG/DL
CO2 SERPL-SCNC: 26 MMOL/L
CREAT SERPL-MCNC: 0.94 MG/DL
EGFR: 64 ML/MIN/1.73M2
EOSINOPHIL # BLD AUTO: 0.06 K/UL
EOSINOPHIL NFR BLD AUTO: 0.8 %
ESTIMATED AVERAGE GLUCOSE: 120 MG/DL
GLUCOSE SERPL-MCNC: 56 MG/DL
HBA1C MFR BLD HPLC: 5.8 %
HCT VFR BLD CALC: 39.3 %
HDLC SERPL-MCNC: 91 MG/DL
HGB BLD-MCNC: 12.9 G/DL
IMM GRANULOCYTES NFR BLD AUTO: 0.3 %
LDLC SERPL CALC-MCNC: 113 MG/DL
LDLC SERPL DIRECT ASSAY-MCNC: 123 MG/DL
LYMPHOCYTES # BLD AUTO: 1.36 K/UL
LYMPHOCYTES NFR BLD AUTO: 17.8 %
MAN DIFF?: NORMAL
MCHC RBC-ENTMCNC: 28.9 PG
MCHC RBC-ENTMCNC: 32.8 GM/DL
MCV RBC AUTO: 87.9 FL
MONOCYTES # BLD AUTO: 0.52 K/UL
MONOCYTES NFR BLD AUTO: 6.8 %
NEUTROPHILS # BLD AUTO: 5.61 K/UL
NEUTROPHILS NFR BLD AUTO: 73.6 %
NONHDLC SERPL-MCNC: 135 MG/DL
PLATELET # BLD AUTO: 289 K/UL
POTASSIUM SERPL-SCNC: 4.9 MMOL/L
PROT SERPL-MCNC: 6.9 G/DL
RBC # BLD: 4.47 M/UL
RBC # FLD: 15.1 %
SODIUM SERPL-SCNC: 134 MMOL/L
TRIGL SERPL-MCNC: 111 MG/DL
WBC # FLD AUTO: 7.62 K/UL

## 2022-08-12 ENCOUNTER — OUTPATIENT (OUTPATIENT)
Dept: OUTPATIENT SERVICES | Facility: HOSPITAL | Age: 72
LOS: 1 days | End: 2022-08-12
Payer: MEDICARE

## 2022-08-12 ENCOUNTER — APPOINTMENT (OUTPATIENT)
Dept: RADIOLOGY | Facility: CLINIC | Age: 72
End: 2022-08-12

## 2022-08-12 ENCOUNTER — RESULT REVIEW (OUTPATIENT)
Age: 72
End: 2022-08-12

## 2022-08-12 DIAGNOSIS — M54.9 DORSALGIA, UNSPECIFIED: ICD-10-CM

## 2022-08-12 DIAGNOSIS — Z96.651 PRESENCE OF RIGHT ARTIFICIAL KNEE JOINT: Chronic | ICD-10-CM

## 2022-08-12 DIAGNOSIS — Z98.49 CATARACT EXTRACTION STATUS, UNSPECIFIED EYE: Chronic | ICD-10-CM

## 2022-08-12 DIAGNOSIS — Z98.890 OTHER SPECIFIED POSTPROCEDURAL STATES: Chronic | ICD-10-CM

## 2022-08-12 PROCEDURE — 72082 X-RAY EXAM ENTIRE SPI 2/3 VW: CPT

## 2022-08-12 PROCEDURE — 72082 X-RAY EXAM ENTIRE SPI 2/3 VW: CPT | Mod: 26

## 2022-08-15 ENCOUNTER — APPOINTMENT (OUTPATIENT)
Dept: ORTHOPEDIC SURGERY | Facility: CLINIC | Age: 72
End: 2022-08-15

## 2022-08-15 ENCOUNTER — APPOINTMENT (OUTPATIENT)
Dept: MRI IMAGING | Facility: CLINIC | Age: 72
End: 2022-08-15

## 2022-08-15 VITALS
WEIGHT: 172 LBS | DIASTOLIC BLOOD PRESSURE: 82 MMHG | BODY MASS INDEX: 29.37 KG/M2 | HEIGHT: 64 IN | SYSTOLIC BLOOD PRESSURE: 180 MMHG

## 2022-08-15 PROCEDURE — 72082 X-RAY EXAM ENTIRE SPI 2/3 VW: CPT

## 2022-08-15 PROCEDURE — 72146 MRI CHEST SPINE W/O DYE: CPT

## 2022-08-15 PROCEDURE — 99215 OFFICE O/P EST HI 40 MIN: CPT

## 2022-08-15 PROCEDURE — 72148 MRI LUMBAR SPINE W/O DYE: CPT

## 2022-08-15 NOTE — PHYSICAL EXAM
[de-identified] : Lumbar Physical Exam\par \par The patient walks with a walker at baseline.  She is walking with a forward pitched posture.  She is nearly falling over onto her walker when she walks.  She bends her hips and knees in order to compensate when trying to extend her back.  She does have clinically obvious hyperkyphosis in her thoracic spine as well\par \par Reflexes\par Patellar - normal\par Gastroc - normal\par Clonus - No\par \par Hip Exam - Normal\par \par Straight leg raise - none\par \par Pulses - 2+ dp/pt\par \par Range of motion - normal\par \par Sensation \par Sensation intact to light touch in L1, L2, L3, L4, L5 and S1 dermatomes bilaterally\par \par Motor\par 	IP	Quad	HS	TA	Gastroc	EHL\par Right	5/5	5/5	5/5	5/5	5/5	5/5\par Left	5/5	5/5	5/5	5/5	5/5	5/5 [de-identified] : Scoliosis radiographs\par Multiple compression deformities in the thoracic spine\par Significant facet arthropathy noted\par SVA is nearly 20 cm positive

## 2022-08-15 NOTE — ASSESSMENT
[FreeTextEntry1] : I had a long discussion with the patient regards to her treatment plan and diagnosis.  She does have signs and symptoms concerning for adult spinal deformity.  We briefly discussed various treatment options but at this point we both decided that the best thing to do is to try to find out what ever conservative management we can find to help with her function.  This will mean that we will obtain a thoracic and lumbar MRI so that we can further evaluate her neural elements.  I would also like her to continue to ambulate.  She can take Tylenol as needed for pain relief.  I will have her follow-up in the next 3 to 4 weeks for repeat clinical evaluation.  I encouraged her to follow-up sooner if she has any new or worsening symptoms.

## 2022-08-15 NOTE — HISTORY OF PRESENT ILLNESS
[de-identified] : This is a 72-year-old female that is here today for evaluation and treatment of her back pain.  She has a previous history of multiple scoliosis surgeries done 10+ years ago.  Recently however she has had significant mid back pain.  She also feels like she is pitched forward when she walks.  She denies any radiating type symptoms down her arms or her legs.  She walks with a walker at baseline.

## 2022-08-16 ENCOUNTER — APPOINTMENT (OUTPATIENT)
Dept: ENDOCRINOLOGY | Facility: CLINIC | Age: 72
End: 2022-08-16

## 2022-08-17 ENCOUNTER — APPOINTMENT (OUTPATIENT)
Dept: ENDOCRINOLOGY | Facility: CLINIC | Age: 72
End: 2022-08-17

## 2022-08-17 VITALS
BODY MASS INDEX: 30.05 KG/M2 | RESPIRATION RATE: 16 BRPM | HEIGHT: 64 IN | SYSTOLIC BLOOD PRESSURE: 188 MMHG | TEMPERATURE: 98 F | WEIGHT: 176 LBS | HEART RATE: 71 BPM | OXYGEN SATURATION: 98 % | DIASTOLIC BLOOD PRESSURE: 75 MMHG

## 2022-08-17 PROCEDURE — 99214 OFFICE O/P EST MOD 30 MIN: CPT

## 2022-08-17 NOTE — HISTORY OF PRESENT ILLNESS
[Prolia (Denosumab)] : Prolia (Denosumab) [Premat. Menopause (surg)] : premature menopause which occurred surgically [High Fall Risk] : high fall risk [Frequent Falls] : frequent falls [Uses a Walker/Cane] : use of a walker/cane [Regular Dental Follow-Up] : regular dental follow-up [History of Blood Clots] : history of blood clots [FreeTextEntry1] : 72-year-old F with PMH osteoporosis, thyroid nodules, prediabetes presents for follow up.\par \par Osteoporosis: Seen in 2013 for osteoporosis. Told of osteopenia approximately in 2005. Was treated from [id number] with Fosamax and then stopped due to esophagitis. DXA 2013 stable - drug holiday continued. No history of fractures. Mother did not have hip fx. Patient smoked 1-2 PPD x 20 yrs - quit 1982. No prolonged steroid use. No recent steroids. Premature menopause at age 33 s/p RAJIV for fibroids. She has a history of several orthopedic procedures including spinal fusion 1966 and multiple further spinal surgeries. History of left hip replacement 2010 and right shoulder replacement 2012 for arthritis.  Taking Vit D 2000 IU QD. Now taking calcium. Seen initially by me 5/2/16 with repeat DXA with significant decline in BMD of 1/3 radius from -2.4 in 2013 to -3.4. PTH not elevated, but parathyroid adenoma possible visualized on thyroid U/S. Recommended prolia s/p 1st injection 5/24/16, 2nd injection 11/2016. 3rd injection 6/2017. 4th injection 12/18/17. 5th injection 7/2/18. 6th injection 1/2019. 7th injection 9/2019. Was due for 8th injection 3/2020, but postponed due to COVID. 8th injection given 1/2021.  9th injection 7/2021. 10th injection 2/2022 after calcium repeated to ensure no hypocalcemia. Due for 11th injection. No recent fractures, but has fallen since last visit. Follows with dental. No upcoming dental work. Has hx of dental implants.  DXA 6/2017 total spine -2.1, 1/3 radius -3.1, Hip -2.1. Last DXA 1/2021 stable.\par Had right hip replacement 1/2022. Due for DEXA 1/2023.\par \par B/L Thyroid Nodules: Outpatient Thyroid US performed 3/2016 revealed B/L thyroid nodules, with heterogenous, hypoechoic solid nodule of the L midpole measuring 1.5 x 1.1 x 0.8cm;  heterogenous, hypoechoic solid nodule of the R mid-lower pole measuring 1.3 x 0.8 x 1.1 cm; and  heterogenous, hypoechoic solid nodule of the RLL measuring 1.3 x 1.4 x 1.0 cm.  Nodules were found incidentally on work up for falls. No personal h/o radiation to the head or neck. No h/o abnormal thyroid blood work. No voice changes. + dysphagia after paraesophageal hernia repair. Father had thyroid nodule - unknown if malignant. Seen initially 5/2016 with thyroid U/S confirming dominant left thyroid nodule. Recommended FNA performed 5/2016 with benign results. Repeat thyroid US 1/2019 stable. Patient reports occasional hand tremors. Denies palpitations. Denies heat or cold intolerance. +weight loss and constipation from methadone. No h/o of amiodarone or lithium use. No changes to the neck. \par \par On chronic methadone for restless leg syndrome [Taking Steroids] : no past or present history of taking steroids [Kidney Stones] : no history of kidney stones [Excessive Alcohol Intake] : no past or present history of excessive alcohol intake [Current Smoking___(ppd)] : not currently smoking [Family History of Osteoporosis] : no family history of osteoporosis [Family History of Breast Cancer] : no family history of breast cancer [Family History of Hip Fracture] : no family history of hip fracture [Hyperparathyroidism] : no hyperparathyroidism [History of Radiation Therapy] : no history of radiation therapy [Previous Fragility Fracture] : no previous fragility fracture

## 2022-08-17 NOTE — PHYSICAL EXAM
[Alert] : alert [Well Nourished] : well nourished [Healthy Appearance] : healthy appearance [No Acute Distress] : no acute distress [Normal Voice/Communication] : normal voice communication [EOMI] : extra ocular movement intact [No Proptosis] : no proptosis [Supple] : the neck was supple [No Respiratory Distress] : no respiratory distress [No Accessory Muscle Use] : no accessory muscle use [Normal Rate and Effort] : normal respiratory rate and effort [Clear to Auscultation] : lungs were clear to auscultation bilaterally [Normal S1, S2] : normal S1 and S2 [Normal Rate] : heart rate was normal [Regular Rhythm] : with a regular rhythm [No Edema] : no peripheral edema [Normal Bowel Sounds] : normal bowel sounds [Not Tender] : non-tender [Not Distended] : not distended [Soft] : abdomen soft [Kyphosis] : kyphosis present [No Stigmata of Cushings Syndrome] : no stigmata of Cushings Syndrome [No Involuntary Movements] : no involuntary movements were seen [Oriented x3] : oriented to person, place, and time [Normal Affect] : the affect was normal [Normal Mood] : the mood was normal [de-identified] : +nodular thyroid gland

## 2022-08-17 NOTE — ASSESSMENT
[Carbohydrate Consistent Diet] : carbohydrate consistent diet [Importance of Diet and Exercise] : importance of diet and exercise to improve glycemic control, achieve weight loss and improve cardiovascular health [FreeTextEntry1] : 72-year-old F w/  \par \par 1. B/L Thyroid nodules - Thyroid FNA of Left nodule (please refer to official US report) benign. Repeat focused thyroid U/S performed 8/2021 stable compared to 1/2019 and stable compared to 2016 (see results section for full description). Clinically euthyroid. Repeat thyroid US today stable. \par \par 2. Osteoporosis -  DXA repeated 6/2017 with results as noted above with stable/mild improvement in proximal radius T score of -3.1 from -3.4 (-11.3% change from 2013). s/p drug holiday for ~ 7 years. Started on prolia 1st dose 5/24/16. 2nd dose 11/29/16. 3rd Prolia 6/2017. 4th dose 12/2017. 5th dose 7/2018. 6th dose 1/2019. 7th dose of Prolia given 9/2019 (BUY AND BILL). 8th injection 1/2021. 9th injection 7/2021. 10th injection 2/2022. 11th injection today. Last DEXA 1/2021 generally stable with improvement in the femoral neck, but now with possible thoracic compression fx. Awaiting MRI report. If new fractures on Prolia therapy would recommend switch treatment to Romozosumab. \par \par 3. Prediabetes- A1c 5.7%. Lifestyle modification advised.  \par \par 4. HTN- c/w chlorthalidone and losartan\par \par Prep time with review of hospital labs and interval progress notes and consultations\par Discussion with patient regarding thyroid nodules and osteoporosis treatment options and goals of care answering all patients questions and addressing all concerns \par Multiple thyroid nodules assessed via focused thyroid US and discussed with the patient at the time of the visit\par Contacted Radiology for possible compression fx on MRI\par Post-visit completion charting and review\par Total Time 36 min\par \par Specifically causes, evaluation, treatment options, risks, complications, and benefits of available therapies were discussed. Questions were answered.\par \par The submitted E/M billing level for this visit reflects the total time spent on the day of the visit including face-to-face time spent with the patient, non-face-to-face review of medical records and relevant information, documentation, and asynchronous communication with the patient after a visit via phone, email, or patient’s EHR portal after the visit. \par The medical records reviewed are either scanned into the chart or reviewed with the patient using a patient’s electronic medical records portal for patients with records not available to [facility] via electronic transmission platforms from other institutions and labs. \par Time spend counseling and performing coordination of care was also included in determining the appropriate EM billing level.\par \par I have reviewed and verified information regarding the chief complaint and history recorded by the ancillary staff and/or the patient. I have independently reviewed and interpreted tests performed by other physicians and facilities as necessary. \par \par I have discussed with the patient differential diagnosis, reason for auxiliary tests if ordered, risks, benefits, alternatives, and complications of each form of therapy were discussed. \par \par \par

## 2022-08-17 NOTE — PROCEDURE
[Only [employer]or[employer] e 2008 model, 10-12 MHz frequencies] : multiple real time longitudinal and transverse images were obtained using a high resolution ultrasound with a linear transducer, Only [employer] e 2008 model, 10-12 MHz frequencies. All measurements will be reported as longitudinal x sal-posterior x transverse. [Report dated ___] : Report dated [unfilled] [Thyroid Nodule] : thyroid nodule [] : a heterogeneous parenchyma  [Lower] : lower pole there is a  [Peripheral vascularity] : peripheral vascularity [Mixed] : mixed [Heterogeneous] : heterogenous nodule [Distinct] : distinct [Peripheral and scant  internal vascularity] : peripheral and scant internal vascularity [Left Thyroid] : left [Mid] : mid pole there is a  [Hypoechoic] : hypoechoic nodule [Ovoid] : ovoid in shape [Right Thyroid] : right [Upper] : upper pole there is a  [Cyst] : cyst [Round] : round in shape [Smooth] : smooth [No] : does not have a halo [No calcifications] : no calcifications [No vascularity] : no vascularity [FreeTextEntry1] : 3.66 x 1.33 x 1.64 [FreeTextEntry5] : 4.12 x 1.21 x 1.68 [FreeTextEntry2] : 0.25 [FreeTextEntry4] : parathyroid adenoma posterior to R lobe 1.18 x 1.08 x 0.96 cm [FreeTextEntry3] : 0.75 x 0.56 x 0.68 [de-identified] : Additional bilateral subcentimeter cysts and nodules too numerous to characterize.

## 2022-08-17 NOTE — DATA REVIEWED
[FreeTextEntry1] : Focused Thyroid US 8/17/2022: Right Upper Nodule 1.52 x 1.32 x 0.77; Right Lower Nodule 0.78 x 0.85 x 0.88; Right Parathyroid 1.16 x 0.73 x 0.84; Left Nodule appears to be 2 distinct nodules 0.97 x 0.75 x 0.77 with coarse calcification and smaller 0.5 cm nodule adjacent. Additional b/l subcentimeter cysts and nodules too numerous to characterize. \par \par Labs 7/2022:\par CBC normal\par Ca around 9.0\par A1c 5.8%\par \par Chol 227\par HDL 91\par \par CMP normal except sodium of 134, glucose 56 likely due to hemolysis, ALKP 129\par \par Labs 1/2022:\par Ca 8.0\par \par Focused Thyroid US 8/10/2021: Right Upper Nodule 1.42 x 1.21 x 0.78; Right Lower Nodule 0.87 x 0.83 x 0.86; Right Parathyroid 1.16 x 0.73 x 0.84; Left Nodule 1.28 x 0.77 x 1.05 with coarse calcification. Additional b/l subcentimeter cysts and nodules too numerous to characterize. \par \par Labs 7/2021:\par CMP normal except glucose of 107\par Vit D 26.6\par \par Labs 10/2019:\par CBC normal\par \par HDL 63\par Chol 242\par Trig 188\par CMP normal\par TSH 1.67\par A1c 5.7%\par \par Labs 3/2019:\par \par \par Thyroid Ultrasound Report 1/7/19:\par \par Technique: multiple real time longitudinal and transverse images were obtained using a high resolution ultrasound with a linear transducer, [employer] e 2008 model, 10-12 MHz frequencies. All measurements will be reported as longitudinal x sal-posterior x transverse. \par Comparison: Report dated 5/2016. \par \par Indication: thyroid nodule. \par \par Findings: \par The right thyroid lobe measures 3.66 x 1.33 x 1.64 cm. The left thyroid lobe measures 4.12 x 1.21 x 1.68 cm. The isthmus measures 0.25 cm. \par The right thyroid lobe has a heterogeneous parenchyma. \par The left thyroid lobe has a heterogeneous parenchyma . \par  \par In the right mid pole there is a  mixed, heterogenous nodule. It measures 1.33 x 0.77 x 1.17 cm. The nodule is ovoid in shape and the border is distinct. It has peripheral vascularity. \par \par In the right lower pole there is a  mixed. It measures 0.95 x 0.81 x 0.96 cm. The nodule is ovoid in shape and the border is distinct. It has peripheral vascularity. \par \par In the left mid pole there is a  mixed, heterogenous nodule. It measures 1.11 x 1.03 x 0.94 cm. The nodule is ovoid in shape and the border is distinct. It has peripheral and scant internal vascularity. \par \par In the left mid pole there is a  hypoechoic nodule. It measures 0.65 x 0.43 x 0.87 cm. The nodule is ovoid in shape and the border is smooth. The nodule does not have a halo. It demonstrates no calcifications. It has no vascularity. parathyroid adenoma posterior to R lobe 1.18 x 1.08 x 0.96 cm. \par \par In the right upper pole there is a  cyst. It measures 0.75 x 0.56 x 0.68 cm. The nodule is round in shape and the border is smooth. The nodule does not have a halo. It demonstrates no calcifications. It has no vascularity. \par \par Additional bilateral subcentimeter cysts and nodules too numerous to characterize. \par \par \par Labs 9/2018:\par CBC normal\par CMP normal\par A1c 5.8%\par Ca 10\par Corrected Ca 9.5\par \par HDL 61\par Choll 232\par Trig 98\par Vit D 43.2\par Mg 1.9\par \par Labs 7/2018:\par CBC normal\par A1c 5.7%\par CMP normal\par Ca 10\par Corrected 9.6\par PTH 46\par TSH 2.26\par Free T4 1.4\par Phos 2.8\par Vit D 39.8\par Vit D 1,25 37.3\par \par Labs 12/5/17:\par CBC normal\par Mg 1.9\par Glucose 112\par Ca 10\par PTH 36\par CMP otherwise normal\par Phos 3.5\par Chol 228\par Trig 243\par HDL 59\par \par TSH 1.67\par Free T4 1.2\par A1c 5.6%\par Vit D 30.9\par \par \par \par Labs 4/2017:\par Glucose 107\par \par HDL 76\par Chol 245\par Trig 205\par Vit D 31.8\par A1c 6.1%\par \par Labs 11/2016:\par TSH 1.26\par Free T4 1.3\par CBC normal\par A1c 6.0%\par CMP normal except glucose of 115\par \par \par FNA 5/31/16: Benign\par \par Labs 5/2/16:\par PTH 33\par Ca 10.2\par TSH 1.36\par Free T4 1.3\par \par Thyroid US performed 5/2/16:\par \par Indication: thyroid nodule. \par \par Findings: \par The right thyroid lobe measures 3.63 x 1.12 x 1.71 cm. The left thyroid lobe measures 3.73x 1.22 x 1.72 cm. The isthmus measures 0.29 cm. \par  \par In the right lower pole there is a  mixed, heterogenous nodule. It measures 1.29 x 0.71 x 1.1 cm. The nodule is ovoid in shape and the border is distinct. It has peripheral vascularity. \par \par In the right lower pole there is a  mixed. It measures 0.83 0.52 x 0.70 cm. The nodule is ovoid in shape and the border is distinct. It has peripheral vascularity. \par \par In the left lower pole there is a  mixed, heterogenous nodule. It measures 1.38 x 1.02 x 0.82 cm. The nodule is ovoid in shape and the border is distinct. It has peripheral and scant internal vascularity. \par \par parathyroid adenoma posterior to R lobe 1.18 x 1.08 x 0.96 cm. \par \par DEXA performed May 2,2016\par spine not performed due to surgery\par Tot Hip -2.2 (+4.9% change from 2013)\par Fem Neck -2.3 Z(-5.2 % change from 2013)\par Proximal radius -3.4 (-11.3% change from 2013)\par \par DEXA performed September 10, 2013\par spine not performed due to surgery\par Total Hipt -2.5, osteoporosis, stable versus prior study of 2011 (-2.7)\par femoral neck -2.0, osteopenia, stable versus 2011 (-2.3)\par Proximal radius -2.4, osteopenia, no prior

## 2022-08-25 ENCOUNTER — APPOINTMENT (OUTPATIENT)
Dept: ORTHOPEDIC SURGERY | Facility: CLINIC | Age: 72
End: 2022-08-25

## 2022-08-25 DIAGNOSIS — M17.12 UNILATERAL PRIMARY OSTEOARTHRITIS, LEFT KNEE: ICD-10-CM

## 2022-08-25 PROCEDURE — 99213 OFFICE O/P EST LOW 20 MIN: CPT

## 2022-08-25 PROCEDURE — 73502 X-RAY EXAM HIP UNI 2-3 VIEWS: CPT

## 2022-08-25 NOTE — PHYSICAL EXAM
[de-identified] : Patient is well nourished, well-developed, in no acute distress, with appropriate mood and affect. The patient is oriented to time, place, and person. Respirations are even and unlabored. Gait evaluation does reveal a limp which is secondary to her lumbar disease. There is no inguinal adenopathy. Examination of the contralateral hip shows normal range of motion, strength, no tenderness, and intact skin. The affected limb is well-perfused and showed 2+ dp/pt pulses, without skin lesions, shows a grossly normal motor and sensory examination. Examination of the hip shows a well healed surgical scar. Hip motion is full and painless from 0-90 degrees extension to flexion, 20 degrees adduction and 20 degrees abduction, and 15 degrees internal and 30 degrees external rotation. Leg lengths are approximately equal. Both hips are stable and muscle strength is normal with good strength with resisted abduction and adduction. Pedal pulses are palpable. [de-identified] : AP pelvis, AP hip, and lateral x-rays of the right hip were ordered and obtained in the office and demonstrate satisfactory position and alignment of the components are present. No signs of loosening are seen.

## 2022-08-25 NOTE — DISCUSSION/SUMMARY
[de-identified] : Doing very well status post right total of arthroplasty.  She will continue to follow-up with Dr. Higgins regarding her lumbar spine disease.  She can be weight-bear as tolerated.  Posterior precautions.  Daily home exercise program recommended.  She admits that she is not walking daily.  Follow-up with me is recommended in 3 years for long-term monitoring for

## 2022-08-25 NOTE — HISTORY OF PRESENT ILLNESS
[de-identified] : This is very nice 72-year-old female here for interim evaluation of right total hip arthroplasty. The patient reports good pain relief since surgery in the replaced joint and satisfactory restoration of function in terms of activities of daily living.  For the hip she does not require pain medication, and completed postoperative physical therapy.  She does use a walker because of her back.  They report unlimited activities of daily living and unlimited walking tolerance with regard to the right hip although she has severe spine disease which is only thing limiting her at this point. The patient is thrilled with their progress from surgery and ultimate outcome. \par

## 2022-08-29 ENCOUNTER — APPOINTMENT (OUTPATIENT)
Dept: ORTHOPEDIC SURGERY | Facility: CLINIC | Age: 72
End: 2022-08-29

## 2022-08-29 VITALS — HEIGHT: 64 IN | WEIGHT: 176 LBS | BODY MASS INDEX: 30.05 KG/M2

## 2022-08-29 PROCEDURE — 99214 OFFICE O/P EST MOD 30 MIN: CPT

## 2022-08-29 NOTE — PHYSICAL EXAM
[de-identified] : Lumbar Physical Exam\par \par The patient walks with a walker at baseline.  She is walking with a forward pitched posture.  She is nearly falling over onto her walker when she walks.  She bends her hips and knees in order to compensate when trying to extend her back.  She does have clinically obvious hyperkyphosis in her thoracic spine as well\par \par Reflexes\par Patellar - normal\par Gastroc - normal\par Clonus - No\par \par Hip Exam - Normal\par \par Straight leg raise - none\par \par Pulses - 2+ dp/pt\par \par Range of motion - normal\par \par Sensation \par Sensation intact to light touch in L1, L2, L3, L4, L5 and S1 dermatomes bilaterally\par \par Motor\par 	IP	Quad	HS	TA	Gastroc	EHL\par Right	5/5	5/5	5/5	5/5	5/5	5/5\par Left	5/5	5/5	5/5	5/5	5/5	5/5 [de-identified] : Scoliosis radiographs\par Multiple compression deformities in the thoracic spine\par Significant facet arthropathy noted\par SVA is nearly 20 cm positive\par \par Thoracic and lumbar MRI reviewed\par No areas of critical central stenosis\par No areas of critical foraminal stenosis

## 2022-08-29 NOTE — ASSESSMENT
[FreeTextEntry1] : I had a lengthy discussion with the patient in regards to her treatment plan and diagnosis.  While she does have significant adult spinal deformity she is currently not in any extreme amount of pain.  She is able to do activities of daily living without significant discomfort.  At this point I would avoid trying to do any sort of aggressive intervention which could include surgery.  \par \par Today she did state to me that she felt like an elephant was on her chest when she was laying down.  She does use pillows underneath her back when sleeping.  I counseled her that this could be a cardiorespiratory issue and that she should follow-up with her primary care physician as soon as possible for this.  If her symptoms worsen or change and she should proceed to the emergency room.  She understood this and agreed to follow-up with her PCP in regards to this complaint.  I will have her follow-up in 3 months time.  I encouraged her to reach out to me sooner if she has any new or worsened

## 2022-08-29 NOTE — HISTORY OF PRESENT ILLNESS
[de-identified] : Today the patient states that overall her symptoms have improved as compared to her last visit.  She was on steroids for a brief portion time approximate 1 to 2 weeks ago.  Since that time she has had significant provement in her back pain.  She does have some left buttock pain but this is mild to moderate.  She denies any bowel bladder issues.  She denies any saddle anesthesia.  She is here today to go over MRI results.\par \par 08/15/22\par This is a 72-year-old female that is here today for evaluation and treatment of her back pain.  She has a previous history of multiple scoliosis surgeries done 10+ years ago.  Recently however she has had significant mid back pain.  She also feels like she is pitched forward when she walks.  She denies any radiating type symptoms down her arms or her legs.  She walks with a walker at baseline.

## 2022-09-01 ENCOUNTER — APPOINTMENT (OUTPATIENT)
Dept: MAMMOGRAPHY | Facility: CLINIC | Age: 72
End: 2022-09-01

## 2022-09-01 ENCOUNTER — RESULT REVIEW (OUTPATIENT)
Age: 72
End: 2022-09-01

## 2022-09-01 PROCEDURE — 77063 BREAST TOMOSYNTHESIS BI: CPT

## 2022-09-01 PROCEDURE — 77067 SCR MAMMO BI INCL CAD: CPT

## 2022-09-23 DIAGNOSIS — Z86.39 PERSONAL HISTORY OF OTHER ENDOCRINE, NUTRITIONAL AND METABOLIC DISEASE: ICD-10-CM

## 2022-09-28 ENCOUNTER — RX RENEWAL (OUTPATIENT)
Age: 72
End: 2022-09-28

## 2022-10-31 ENCOUNTER — APPOINTMENT (OUTPATIENT)
Dept: UROGYNECOLOGY | Facility: CLINIC | Age: 72
End: 2022-10-31

## 2022-10-31 VITALS
SYSTOLIC BLOOD PRESSURE: 107 MMHG | HEART RATE: 88 BPM | HEIGHT: 64 IN | DIASTOLIC BLOOD PRESSURE: 72 MMHG | BODY MASS INDEX: 30.73 KG/M2 | WEIGHT: 180 LBS

## 2022-10-31 LAB
BILIRUB UR QL STRIP: NORMAL
CLARITY UR: CLEAR
COLLECTION METHOD: NORMAL
GLUCOSE UR-MCNC: NORMAL
HCG UR QL: 0.2 EU/DL
HGB UR QL STRIP.AUTO: NORMAL
KETONES UR-MCNC: NORMAL
LEUKOCYTE ESTERASE UR QL STRIP: NORMAL
NITRITE UR QL STRIP: NORMAL
PH UR STRIP: 6.5
PROT UR STRIP-MCNC: NORMAL
SP GR UR STRIP: 1.01

## 2022-10-31 PROCEDURE — 51701 INSERT BLADDER CATHETER: CPT

## 2022-10-31 PROCEDURE — 99204 OFFICE O/P NEW MOD 45 MIN: CPT | Mod: 25

## 2022-10-31 NOTE — PROCEDURE
[Fluid Management] : fluid management [Bladder Training] : bladder training [FreeTextEntry1] : Sterile straight catheterization was performed to measure a postvoid residual volume which was 50 cc

## 2022-10-31 NOTE — PHYSICAL EXAM
[Chaperone Present] : A chaperone was present in the examining room during all aspects of the physical examination [Vulvar Atrophy] : vulvar atrophy [Labia Majora] : were normal [Atrophy] : atrophy [No Bleeding] : there was no active vaginal bleeding [Normal] : no abnormalities [Exam Deferred] : was deferred [FreeTextEntry1] : General: Well, appearing. Alert and orientated. No acute distress\par HEENT: Normocephalic, atraumatic and extraocular muscles appear to be intact \par Neck: Full range of motion, no obvious lymphadenopathy, deformities, or masses noted \par Respiratory: Speaking in full sentences comfortably, normal work of breathing and no cough during visit\par Musculoskeletal: active full range of motion in extremities \par Extremities: No upper extremity edema noted\par Skin: no obvious rash or skin lesions\par Neuro: Orientated X 3, speech is fluent, normal rate\par Psych: Normal mood and affect \par   [Tenderness] : ~T no ~M abdominal tenderness observed [Distended] : not distended [Absent] : absent [de-identified] : no prolapse

## 2022-10-31 NOTE — HISTORY OF PRESENT ILLNESS
[Rectal Prolapse] : no [Urge Incontinence Of Urine] : no [Urinary Frequency] : no [Uses ___ pads per day] : uses [unfilled] pad(s) per day [Unable To Restrain Bowel Movement] : severe [Urinary Tract Infection] : severe [] : years ago [Constipation Obstructed Defecation] : mild [de-identified] : 5 [de-identified] : 4 times a day [de-identified] : 2-3 times [de-identified] : sometimes [FreeTextEntry6] : BM every 1-4 days [FreeTextEntry1] : Ob hx: NSVDx3\par Gyn: s/p RAJIV for fibroids\par Med Hx: HTN, HLD, arthritis\par Surg hx: b/l hip replacement, right knee sgy, right shoulder surgery, RAJIV (fibroids)\par Meds: methadone, pramipexole, Losartan\par Allergies: PCN (unknown)\par \par 1-1.5 Liters of water and diet peach Snapple. \par She has tried Oxybutynin in the past without improvement in symptoms

## 2022-10-31 NOTE — DISCUSSION/SUMMARY
[FreeTextEntry1] : \par We discussed possible etiologies of her symptoms including overactive bladder. I recommend she start behavioral modifications and bladder training. Written instructions on how to perform bladder training were provided.  She will try this for 4-6 weeks. We reviewed medications for overactive bladder.  If she has no significant improvement in her symptoms she will try adding an OAB medication. Myrbetriq 50 mg daily Rx provided with refills.  Risks and side effects reviewed extensively. She will RTO in 10-12 weeks for follow up or sooner if issues arise. She will RTO in 3 mo for follow up, or sooner if issues arise. \par \par \par The following treatment plan was designed for this patient and provided to her in written form and reviewed extensively. Patient was given a copy to take home: \par For Urinary Symptoms:\par **Total fluids: 34-50 oz (1-1.5 Liters) per day\par   -Ex. 2-3 bottles of water (Each bottle is 16.9 oz). No flavoring added. No seltzer or Pelligrino!\par  -Drink slowly throughout day, no binge drinking (each bottle of water should take you hours, not minutes)\par **Avoid: coffee or tea (even if decaf), alcohol, carbonation (soda, seltzer), spicy foods, citrus, artificial sweeteners, chocolate\par **Stop eating and drinking 2-3 hours before bedtime (sips ok)\par \par -Try the above fluid changes and bladder training for 6 weeks. If symptoms still remain bothersome, add Myrbetriq 50 mg daily in the morning. You must continue the fluid changes while on medication. Medication may take 3-4 weeks to start working. \par \par IUGA info on BT and OAB also provided to her

## 2022-11-02 LAB
APPEARANCE: CLEAR
BACTERIA UR CULT: NORMAL
BACTERIA: NEGATIVE
BILIRUBIN URINE: NEGATIVE
BLOOD URINE: NEGATIVE
COLOR: NORMAL
GLUCOSE QUALITATIVE U: NEGATIVE
HYALINE CASTS: 0 /LPF
KETONES URINE: NEGATIVE
LEUKOCYTE ESTERASE URINE: NEGATIVE
MICROSCOPIC-UA: NORMAL
NITRITE URINE: NEGATIVE
PH URINE: 7
PROTEIN URINE: NEGATIVE
RED BLOOD CELLS URINE: 1 /HPF
SPECIFIC GRAVITY URINE: 1.01
SQUAMOUS EPITHELIAL CELLS: 1 /HPF
UROBILINOGEN URINE: NORMAL
WHITE BLOOD CELLS URINE: 1 /HPF

## 2022-11-07 ENCOUNTER — APPOINTMENT (OUTPATIENT)
Dept: ORTHOPEDIC SURGERY | Facility: CLINIC | Age: 72
End: 2022-11-07

## 2022-11-07 VITALS
WEIGHT: 180 LBS | BODY MASS INDEX: 30.73 KG/M2 | HEART RATE: 73 BPM | HEIGHT: 64 IN | SYSTOLIC BLOOD PRESSURE: 180 MMHG | OXYGEN SATURATION: 96 % | DIASTOLIC BLOOD PRESSURE: 94 MMHG | TEMPERATURE: 98.6 F

## 2022-11-07 DIAGNOSIS — M54.9 DORSALGIA, UNSPECIFIED: ICD-10-CM

## 2022-11-07 PROCEDURE — 99214 OFFICE O/P EST MOD 30 MIN: CPT

## 2022-11-07 NOTE — ASSESSMENT
[FreeTextEntry1] : I had a long discussion with the patient regards to her treatment plan and diagnosis.  In my opinion she may benefit from further pain management treatment with [facility].  Another referral has been placed for them.  I would also like her to see my colleague Dr. Lj sanchez in case he has any other alternative treatments or options for care that I have not considered. I discussed that if surgery is pursued I would like his opinion on care.  We will get her a TLSO brace for comfort.  All questions were answered today.

## 2022-11-07 NOTE — HISTORY OF PRESENT ILLNESS
[de-identified] : Today the patient states that she is still doing some fairly substantial back pain.  She describes significant pain over her right rib cage.  She does feel better when she raises her right foot up.  She denies any bowel bladder issues.  She denies any saddle anesthesia.  She is here today to discuss options for treatment.\par \par 08/29/22\par Today the patient states that overall her symptoms have improved as compared to her last visit.  She was on steroids for a brief portion time approximate 1 to 2 weeks ago.  Since that time she has had significant provement in her back pain.  She does have some left buttock pain but this is mild to moderate.  She denies any bowel bladder issues.  She denies any saddle anesthesia.  She is here today to go over MRI results.\par \par 08/15/22\par This is a 72-year-old female that is here today for evaluation and treatment of her back pain.  She has a previous history of multiple scoliosis surgeries done 10+ years ago.  Recently however she has had significant mid back pain.  She also feels like she is pitched forward when she walks.  She denies any radiating type symptoms down her arms or her legs.  She walks with a walker at baseline.

## 2022-11-07 NOTE — PHYSICAL EXAM
[de-identified] : Lumbar Physical Exam\par \par The patient walks with a walker at baseline.  She is walking with a forward pitched posture.  She is nearly falling over onto her walker when she walks.  She bends her hips and knees in order to compensate when trying to extend her back.  She does have clinically obvious hyperkyphosis in her thoracic spine as well\par \par Reflexes\par Patellar - normal\par Gastroc - normal\par Clonus - No\par \par Hip Exam - Normal\par \par Straight leg raise - none\par \par Pulses - 2+ dp/pt\par \par Range of motion - normal\par \par Sensation \par Sensation intact to light touch in L1, L2, L3, L4, L5 and S1 dermatomes bilaterally\par \par Motor\par 	IP	Quad	HS	TA	Gastroc	EHL\par Right	5/5	5/5	5/5	5/5	5/5	5/5\par Left	5/5	5/5	5/5	5/5	5/5	5/5 [de-identified] : Scoliosis radiographs\par Multiple compression deformities in the thoracic spine\par Significant facet arthropathy noted\par SVA is nearly 20 cm positive\par \par Thoracic and lumbar MRI reviewed\par No areas of critical central stenosis\par No areas of critical foraminal stenosis

## 2022-11-09 ENCOUNTER — APPOINTMENT (OUTPATIENT)
Dept: ORTHOPEDIC SURGERY | Facility: CLINIC | Age: 72
End: 2022-11-09
Payer: MEDICARE

## 2022-11-09 DIAGNOSIS — M40.209 UNSPECIFIED KYPHOSIS, SITE UNSPECIFIED: ICD-10-CM

## 2022-11-09 PROCEDURE — 99214 OFFICE O/P EST MOD 30 MIN: CPT

## 2022-11-09 NOTE — PHYSICAL EXAM
[Stooped] : stooped [Walker] : ambulates with walker [de-identified] : Examination of the lumbar spine reveals no midline tenderness palpation, step-offs, or skin lesions. Decreased range of motion with respect to flexion, extension, lateral bending, and rotation. No tenderness to palpation of the sciatic notch. No tenderness palpation of the bilateral greater trochanters. No pain with passive internal/external rotation of the hips. No instability of bilateral lower extremities.  Negative MAYLIN. Negative straight leg raise bilaterally. No bowstring. Negative femoral stretch. 5 out of 5 iliopsoas, hip abductors, hips adductors, quadriceps, hamstrings, gastrocsoleus, tibialis anterior, extensor hallucis longus, peroneals. Grossly intact sensation to light touch bilateral lower extremities. 1+ patellar and Achilles reflexes. Downgoing Babinski. No clonus. Intact proprioception. Palpable pulses. No skin lesion and no edema on the right and left lower extremities. [de-identified] : Review of her MRIs reveals significant paraspinal muscular atrophy with kyphosis.\par \par Review of her standing scoliosis x-rays reveal significant kyphosis.

## 2022-11-09 NOTE — HISTORY OF PRESENT ILLNESS
[de-identified] : Ms. VIRGINIA HUFFMAN  is a 72 year old female who presents to the office for a surgical opinion.   She has a history of multiple scoliosis surgeries.  She has subsequently had her hardware removed.  She has also had recent MRIs.  Her main complaint is her posture.  She denies leg symptoms.  Normal bowel and bladder control.   Denies any recent fevers, chills, sweats, weight loss, or infection.\par \par The patients past medical history, past surgical history, medications, allergies, and social history were reviewed by me today with the patient and documented accordingly.  In addition, the patient's family history, which is noncontributory to their visit, was also reviewed.\par

## 2022-11-09 NOTE — DISCUSSION/SUMMARY
[de-identified] : We reviewed her imaging.  We discussed further treatment options.  At this point she is not interested in surgical intervention.  She has had multiple surgeries.  We have discussed surgery although I have indicated that this would be significantly complex due to her absence of paraspinal musculature and poor bone quality.  As such she would be very high risk.  At this point she wished to continue with conservative measures.  She will see Pain management and continue to work on posture and try intermittent bracing.  She will let me know of any changes or worsening of her symptoms.

## 2022-11-27 ENCOUNTER — NON-APPOINTMENT (OUTPATIENT)
Age: 72
End: 2022-11-27

## 2022-12-05 ENCOUNTER — APPOINTMENT (OUTPATIENT)
Dept: INTERNAL MEDICINE | Facility: CLINIC | Age: 72
End: 2022-12-05

## 2022-12-05 VITALS — SYSTOLIC BLOOD PRESSURE: 168 MMHG | HEART RATE: 76 BPM | RESPIRATION RATE: 14 BRPM | DIASTOLIC BLOOD PRESSURE: 80 MMHG

## 2022-12-05 DIAGNOSIS — J06.9 ACUTE UPPER RESPIRATORY INFECTION, UNSPECIFIED: ICD-10-CM

## 2022-12-05 PROCEDURE — 99213 OFFICE O/P EST LOW 20 MIN: CPT

## 2022-12-05 NOTE — HISTORY OF PRESENT ILLNESS
[FreeTextEntry8] : RTC to follow up on recent influenza infection \par Still coughing, but otherwise doing OK.\par No shortness of breath, wheezing, hemoptysis, pleuritic chest pain.\par Has also noted some alopecia. \par HAving rouble losing weight, which is worsening her LBP

## 2022-12-06 LAB
ALBUMIN SERPL ELPH-MCNC: 4.5 G/DL
ALP BLD-CCNC: 141 U/L
ALT SERPL-CCNC: 15 U/L
ANION GAP SERPL CALC-SCNC: 15 MMOL/L
AST SERPL-CCNC: 18 U/L
BASOPHILS # BLD AUTO: 0.05 K/UL
BASOPHILS NFR BLD AUTO: 0.8 %
BILIRUB SERPL-MCNC: 0.4 MG/DL
BUN SERPL-MCNC: 21 MG/DL
CALCIUM SERPL-MCNC: 10.2 MG/DL
CHLORIDE SERPL-SCNC: 100 MMOL/L
CO2 SERPL-SCNC: 25 MMOL/L
CREAT SERPL-MCNC: 0.94 MG/DL
EGFR: 64 ML/MIN/1.73M2
EOSINOPHIL # BLD AUTO: 0.23 K/UL
EOSINOPHIL NFR BLD AUTO: 3.7 %
FERRITIN SERPL-MCNC: 74 NG/ML
GLUCOSE SERPL-MCNC: 104 MG/DL
HCT VFR BLD CALC: 41.5 %
HGB BLD-MCNC: 12.9 G/DL
IMM GRANULOCYTES NFR BLD AUTO: 0.6 %
IRON SATN MFR SERPL: 18 %
IRON SERPL-MCNC: 65 UG/DL
LYMPHOCYTES # BLD AUTO: 1.54 K/UL
LYMPHOCYTES NFR BLD AUTO: 24.9 %
MAN DIFF?: NORMAL
MCHC RBC-ENTMCNC: 27.9 PG
MCHC RBC-ENTMCNC: 31.1 GM/DL
MCV RBC AUTO: 89.8 FL
MONOCYTES # BLD AUTO: 0.35 K/UL
MONOCYTES NFR BLD AUTO: 5.7 %
NEUTROPHILS # BLD AUTO: 3.98 K/UL
NEUTROPHILS NFR BLD AUTO: 64.3 %
PLATELET # BLD AUTO: 274 K/UL
POTASSIUM SERPL-SCNC: 4.4 MMOL/L
PROLACTIN SERPL-MCNC: 0.7 NG/ML
PROT SERPL-MCNC: 6.9 G/DL
RBC # BLD: 4.62 M/UL
RBC # FLD: 14.5 %
SODIUM SERPL-SCNC: 140 MMOL/L
TESTOST SERPL-MCNC: <2.5 NG/DL
TIBC SERPL-MCNC: 365 UG/DL
TSH SERPL-ACNC: 1.87 UIU/ML
UIBC SERPL-MCNC: 300 UG/DL
WBC # FLD AUTO: 6.19 K/UL

## 2022-12-08 LAB — ZINC SERPL-MCNC: 79 UG/DL

## 2022-12-16 ENCOUNTER — RX RENEWAL (OUTPATIENT)
Age: 72
End: 2022-12-16

## 2023-01-01 ENCOUNTER — RX RENEWAL (OUTPATIENT)
Age: 73
End: 2023-01-01

## 2023-01-17 ENCOUNTER — RESULT REVIEW (OUTPATIENT)
Age: 73
End: 2023-01-17

## 2023-01-17 ENCOUNTER — APPOINTMENT (OUTPATIENT)
Dept: RADIOLOGY | Facility: CLINIC | Age: 73
End: 2023-01-17
Payer: MEDICARE

## 2023-01-17 PROCEDURE — 73140 X-RAY EXAM OF FINGER(S): CPT | Mod: LT

## 2023-01-19 ENCOUNTER — APPOINTMENT (OUTPATIENT)
Dept: DERMATOLOGY | Facility: CLINIC | Age: 73
End: 2023-01-19
Payer: MEDICARE

## 2023-01-19 DIAGNOSIS — L82.1 OTHER SEBORRHEIC KERATOSIS: ICD-10-CM

## 2023-01-19 DIAGNOSIS — D18.01 HEMANGIOMA OF SKIN AND SUBCUTANEOUS TISSUE: ICD-10-CM

## 2023-01-19 DIAGNOSIS — L65.9 NONSCARRING HAIR LOSS, UNSPECIFIED: ICD-10-CM

## 2023-01-19 DIAGNOSIS — L21.9 SEBORRHEIC DERMATITIS, UNSPECIFIED: ICD-10-CM

## 2023-01-19 DIAGNOSIS — L85.3 XEROSIS CUTIS: ICD-10-CM

## 2023-01-19 PROCEDURE — 99204 OFFICE O/P NEW MOD 45 MIN: CPT

## 2023-01-19 RX ORDER — MOMETASONE FUROATE 1 MG/ML
0.1 SOLUTION TOPICAL
Qty: 1 | Refills: 1 | Status: ACTIVE | COMMUNITY
Start: 2023-01-19 | End: 1900-01-01

## 2023-01-24 ENCOUNTER — APPOINTMENT (OUTPATIENT)
Dept: ORTHOPEDIC SURGERY | Facility: CLINIC | Age: 73
End: 2023-01-24
Payer: MEDICARE

## 2023-01-24 VITALS
HEART RATE: 80 BPM | HEIGHT: 64 IN | TEMPERATURE: 97.9 F | SYSTOLIC BLOOD PRESSURE: 146 MMHG | WEIGHT: 174 LBS | DIASTOLIC BLOOD PRESSURE: 78 MMHG | BODY MASS INDEX: 29.71 KG/M2

## 2023-01-24 PROCEDURE — 29130 APPL FINGER SPLINT STATIC: CPT | Mod: LT

## 2023-01-24 PROCEDURE — 99214 OFFICE O/P EST MOD 30 MIN: CPT | Mod: 57

## 2023-01-24 PROCEDURE — 26742 TREAT FINGER FRACTURE EACH: CPT

## 2023-01-24 NOTE — HISTORY OF PRESENT ILLNESS
[FreeTextEntry1] : Haley is a pleasant 72-year-old female who presents today with her son who help with history.  On January 5 she suffered an injury to her left hand specifically the left middle finger at the level of the DIP joint.  She presents with outside imaging

## 2023-01-24 NOTE — DISCUSSION/SUMMARY
[FreeTextEntry1] : 1.  We will commence mallet finger protocol.  For her left middle distal phalangeal dorsal fracture avulsion injury today she was placed in a finger splint.  I manipulated the fracture to assess for stability which was stable.  She tolerated placement of this very well in extension.

## 2023-01-24 NOTE — CONSULT LETTER
[Dear  ___] : Dear  [unfilled], [Consult Letter:] : I had the pleasure of evaluating your patient, [unfilled]. [Please see my note below.] : Please see my note below. [Consult Closing:] : Thank you very much for allowing me to participate in the care of this patient.  If you have any questions, please do not hesitate to contact me. [Sincerely,] : Sincerely, [FreeTextEntry2] : Fran Carr MD\par [address]\[place] [address] [FreeTextEntry3] : Mick Montalvo MD

## 2023-01-24 NOTE — PHYSICAL EXAM
[de-identified] : She is well-appearing on examination\par \par Examination of the left hand reveals tenderness at the level of the basal joint with obvious osteoarthritic changes clinically.\par Examination of multiple IP joints in the fingers do show arthritic changes.  There is swelling and tenderness at the level of the left middle DIP joint with a 20 degree mallet lag [de-identified] : I reviewed outside x-ray of her left hand ordered by separate provider showing multiple IP joint osteoarthritis as well as basal joint osteoarthritis and a bony mallet of the left middle distal phalanx as a base avulsion fracture with intact joint congruity

## 2023-01-24 NOTE — REASON FOR VISIT
[Initial Visit] : an initial visit for [Family Member] : family member [FreeTextEntry2] : left middle finger pain

## 2023-01-24 NOTE — ASSESSMENT
[FreeTextEntry1] : ASSESSMENT:\par \par [She was accompanied today and was assisted with explaining their complaints today.]\par The patient comes in today with acute onset of left middle finger pain in the form of a bony mallet due to a small distal phalanx base avulsion fracture injury.  She has about a 20 degree lag and she would like to consider splinting.\par [I have diagnosed the patient today with a new diagnosis.]\par [This is likely to diminish bodily function for the extremity.] \par \par She was adequately and thoroughly informed of my assessment of their current condition(s). \par \par \par \par

## 2023-01-27 ENCOUNTER — APPOINTMENT (OUTPATIENT)
Dept: ORTHOPEDIC SURGERY | Facility: CLINIC | Age: 73
End: 2023-01-27

## 2023-01-30 ENCOUNTER — APPOINTMENT (OUTPATIENT)
Dept: UROGYNECOLOGY | Facility: CLINIC | Age: 73
End: 2023-01-30
Payer: MEDICARE

## 2023-01-30 VITALS — DIASTOLIC BLOOD PRESSURE: 83 MMHG | HEART RATE: 87 BPM | SYSTOLIC BLOOD PRESSURE: 129 MMHG

## 2023-01-30 PROCEDURE — 99213 OFFICE O/P EST LOW 20 MIN: CPT

## 2023-01-30 NOTE — REASON FOR VISIT
[Follow-Up Visit_____] : a follow-up visit for [unfilled] [Questionnaire Received] : Patient questionnaire received [Intake Form Reviewed] : Patient intake form with past medical history, surgical history, family history and social history reviewed today [Urinary Incontinence] : urinary incontinence [Oxybutynin] : Oxybutynin

## 2023-01-31 NOTE — HISTORY OF PRESENT ILLNESS
[FreeTextEntry1] : Charilne is a 73 y/o that presents to the office today for f/u for OAB and UUI. She reports that she has only been drinking water when home and she is following the diet and behavioral modifications. She reports getting up once during the night. She does report that her UUI has not improved. She was given an Rx for Myrbetriq 50 mg at her visit in October, but has not started it. She states that she would like to start the medication.\par \par

## 2023-01-31 NOTE — PHYSICAL EXAM
[No Acute Distress] : in no acute distress [Well developed] : well developed [Well Nourished] : ~L well nourished [Good Hygeine] : demonstrates good hygeine [Oriented x3] : oriented to person, place, and time [Normal Memory] : ~T memory was ~L unimpaired [Normal Mood/Affect] : mood and affect are normal [Normal] : was normal [FreeTextEntry1] : General: Well, appearing. Alert and orientated. No acute distress\par HEENT: Normocephalic, atraumatic and extraocular muscles appear to be intact \par Neck: Full range of motion, no obvious lymphadenopathy, deformities, or masses noted \par Respiratory: Speaking in full sentences comfortably, normal work of breathing and no cough during visit\par Musculoskeletal: active full range of motion in extremities \par Extremities: No upper extremity edema noted\par Skin: no obvious rash or skin lesions\par Neuro: Orientated X 3, speech is fluent, normal rate\par Psych: Normal mood and affect \par   [Anxiety] : patient is not anxious [Tenderness] : ~T no ~M abdominal tenderness observed [Distended] : not distended [de-identified] : ambulates with walker [de-identified] : no prolapse

## 2023-01-31 NOTE — DISCUSSION/SUMMARY
[FreeTextEntry1] : OAB/UUI:\par Pt's BP is stable\par She will start Myrbetriq 50 mg, risks and side effects reviewed extensively.\par Pt has BP cuff at home and will check BP.\par Will continue to follow diet and behavioral modifications.\par Will follow up in 6-8 weeks or sooner if needed\par \par Advised pt to call the office with any questions or concerns.\par \par

## 2023-02-03 NOTE — DISCUSSION/SUMMARY
Lipids  stable [de-identified] : 72 y/o female with right knee pain secondary to patellofemoral pain due to flexion contracture. A lengthy discussion was held regarding the patients condition and treatment options including all risks, benefits, prognosis and outcomes of each were discussed in detail. The patient would like to continue with conservative management at this time. Non-operative treatment was advised and a knee treatment handout was given and reviewed with the patient. We recommended PT and new a prescription was provided, as we felt this would help improve her motion. The patient will contact me if there are any concerns. Follow up will be prn or if there is no improvement. The patient expressed understanding and all questions were answered.\par \par

## 2023-02-06 ENCOUNTER — RX RENEWAL (OUTPATIENT)
Age: 73
End: 2023-02-06

## 2023-02-15 ENCOUNTER — APPOINTMENT (OUTPATIENT)
Dept: ENDOCRINOLOGY | Facility: CLINIC | Age: 73
End: 2023-02-15
Payer: MEDICARE

## 2023-02-15 VITALS
HEIGHT: 64 IN | WEIGHT: 179 LBS | OXYGEN SATURATION: 97 % | HEART RATE: 89 BPM | DIASTOLIC BLOOD PRESSURE: 70 MMHG | TEMPERATURE: 98.2 F | BODY MASS INDEX: 30.56 KG/M2 | SYSTOLIC BLOOD PRESSURE: 119 MMHG

## 2023-02-15 PROCEDURE — 96372 THER/PROPH/DIAG INJ SC/IM: CPT

## 2023-02-15 PROCEDURE — 99214 OFFICE O/P EST MOD 30 MIN: CPT | Mod: 25

## 2023-02-15 NOTE — PROCEDURE
[LeBUZZ e 2008 model, 10-12 MHz frequencies] : multiple real time longitudinal and transverse images were obtained using a high resolution ultrasound with a linear transducer, LeBUZZ e 2008 model, 10-12 MHz frequencies. All measurements will be reported as longitudinal x sal-posterior x transverse. [Report dated ___] : Report dated [unfilled] [Thyroid Nodule] : thyroid nodule [] : a heterogeneous parenchyma  [Lower] : lower pole there is a  [Peripheral vascularity] : peripheral vascularity [Mixed] : mixed [Heterogeneous] : heterogenous nodule [Distinct] : distinct [Peripheral and scant  internal vascularity] : peripheral and scant internal vascularity [Left Thyroid] : left [Mid] : mid pole there is a  [Hypoechoic] : hypoechoic nodule [Ovoid] : ovoid in shape [Right Thyroid] : right [Upper] : upper pole there is a  [Cyst] : cyst [Round] : round in shape [Smooth] : smooth [No] : does not have a halo [No calcifications] : no calcifications [No vascularity] : no vascularity [FreeTextEntry1] : 3.66 x 1.33 x 1.64 [FreeTextEntry5] : 4.12 x 1.21 x 1.68 [FreeTextEntry2] : 0.25 [FreeTextEntry4] : parathyroid adenoma posterior to R lobe 1.18 x 1.08 x 0.96 cm [FreeTextEntry3] : 0.75 x 0.56 x 0.68 [de-identified] : Additional bilateral subcentimeter cysts and nodules too numerous to characterize.

## 2023-02-15 NOTE — HISTORY OF PRESENT ILLNESS
[Prolia (Denosumab)] : Prolia (Denosumab) [Premat. Menopause (surg)] : premature menopause which occurred surgically [High Fall Risk] : high fall risk [Frequent Falls] : frequent falls [Uses a Walker/Cane] : use of a walker/cane [Regular Dental Follow-Up] : regular dental follow-up [History of Blood Clots] : history of blood clots [FreeTextEntry1] : 73-year-old F with PMH osteoporosis, thyroid nodules, prediabetes presents for follow up.\par \par Osteoporosis: Seen in 2013 for osteoporosis. Told of osteopenia approximately in 2005. Was treated from [id number] with Fosamax and then stopped due to esophagitis. DXA 2013 stable - drug holiday continued. No history of fractures. Mother did not have hip fx. Patient smoked 1-2 PPD x 20 yrs - quit 1982. No prolonged steroid use. No recent steroids. Premature menopause at age 33 s/p RAJIV for fibroids. She has a history of several orthopedic procedures including spinal fusion 1966 and multiple further spinal surgeries. History of left hip replacement 2010 and right shoulder replacement 2012 for arthritis.  Taking Vit D 2000 IU QD. Now taking calcium. Seen initially by me 5/2/16 with repeat DXA with significant decline in BMD of 1/3 radius from -2.4 in 2013 to -3.4. PTH not elevated, but parathyroid adenoma possible visualized on thyroid U/S. Recommended prolia s/p 1st injection 5/24/16, 2nd injection 11/2016. 3rd injection 6/2017. 4th injection 12/18/17. 5th injection 7/2/18. 6th injection 1/2019. 7th injection 9/2019. Was due for 8th injection 3/2020, but postponed due to COVID. 8th injection given 1/2021.  9th injection 7/2021. 10th injection 2/2022 after calcium repeated to ensure no hypocalcemia. 11th injection 8/2022. Has fallen since last visit with left finger fracture. Was following with dental. No upcoming dental work. Has hx of dental implants.  DXA 6/2017 total spine -2.1, 1/3 radius -3.1, Hip -2.1. Last DXA 1/2021 stable.\par Had right hip replacement 1/2022. Due for DEXA. +chronic back, LE, and hip pain. \par \par B/L Thyroid Nodules: Outpatient Thyroid US performed 3/2016 revealed B/L thyroid nodules, with heterogenous, hypoechoic solid nodule of the L midpole measuring 1.5 x 1.1 x 0.8cm;  heterogenous, hypoechoic solid nodule of the R mid-lower pole measuring 1.3 x 0.8 x 1.1 cm; and  heterogenous, hypoechoic solid nodule of the RLL measuring 1.3 x 1.4 x 1.0 cm.  Nodules were found incidentally on work up for falls. No personal h/o radiation to the head or neck. No h/o abnormal thyroid blood work. No voice changes. + dysphagia after paraesophageal hernia repair. Father had thyroid nodule - unknown if malignant. Seen initially 5/2016 with thyroid U/S confirming dominant left thyroid nodule. Recommended FNA performed 5/2016 with benign results. Repeat thyroid US 1/2019 and 8/2022 stable. Patient reports occasional hand tremors. Denies palpitations. Denies heat or cold intolerance. +weight loss and constipation from methadone. No h/o of amiodarone or lithium use. No changes to the neck. \par \par On chronic methadone for restless leg syndrome plans on coming off methadone.  [Taking Steroids] : no past or present history of taking steroids [Kidney Stones] : no history of kidney stones [Excessive Alcohol Intake] : no past or present history of excessive alcohol intake [Current Smoking___(ppd)] : not currently smoking [Family History of Osteoporosis] : no family history of osteoporosis [Family History of Breast Cancer] : no family history of breast cancer [Family History of Hip Fracture] : no family history of hip fracture [Hyperparathyroidism] : no hyperparathyroidism [History of Radiation Therapy] : no history of radiation therapy [Previous Fragility Fracture] : no previous fragility fracture

## 2023-02-15 NOTE — ASSESSMENT
[Carbohydrate Consistent Diet] : carbohydrate consistent diet [Importance of Diet and Exercise] : importance of diet and exercise to improve glycemic control, achieve weight loss and improve cardiovascular health [FreeTextEntry1] : 73-year-old F w/  \par \par 1. B/L Thyroid nodules - Thyroid FNA of Left nodule (please refer to official US report) benign. Repeat focused thyroid U/S performed 8/2021 stable compared to 1/2019 and stable compared to 2016 (see results section for full description). Clinically euthyroid. Repeat thyroid US 8/2022 stable. Can repeat in 2024. \par \par 2. Osteoporosis -  DXA repeated 6/2017 with results as noted above with stable/mild improvement in proximal radius T score of -3.1 from -3.4 (-11.3% change from 2013). s/p drug holiday for ~ 7 years. Started on prolia 1st dose 5/24/16. 2nd dose 11/29/16. 3rd Prolia 6/2017. 4th dose 12/2017. 5th dose 7/2018. 6th dose 1/2019. 7th dose of Prolia given 9/2019 (BUY AND BILL). 8th injection 1/2021. 9th injection 7/2021. 10th injection 2/2022. 11th injection 8/2022. 12th injection today. Last DEXA 1/2021 generally stable with improvement in the femoral neck. Due for DEXA. If new fractures on Prolia therapy or worsening BMD would recommend switch treatment to Romozosumab. \par \par 3. Prediabetes-Lifestyle modification advised.  \par \par 4. HTN- c/w chlorthalidone and losartan\par

## 2023-02-15 NOTE — REVIEW OF SYSTEMS
[Joint Pain] : joint pain [All other systems negative] : All other systems negative [Back Pain] : back pain [Fatigue] : no fatigue [Recent Weight Gain (___ Lbs)] : no recent weight gain [Recent Weight Loss (___ Lbs)] : no recent weight loss [Visual Field Defect] : no visual field defect [Dysphagia] : no dysphagia [Neck Pain] : no neck pain [Dysphonia] : no dysphonia [Chest Pain] : no chest pain [Shortness Of Breath] : no shortness of breath [Nausea] : no nausea [Constipation] : no constipation [Vomiting] : no vomiting [Diarrhea] : no diarrhea [Polyuria] : no polyuria [Polydipsia] : no polydipsia [FreeTextEntry8] : Menopause

## 2023-02-15 NOTE — PHYSICAL EXAM
[Alert] : alert [Well Nourished] : well nourished [Healthy Appearance] : healthy appearance [No Acute Distress] : no acute distress [Normal Voice/Communication] : normal voice communication [EOMI] : extra ocular movement intact [No Proptosis] : no proptosis [Supple] : the neck was supple [No Respiratory Distress] : no respiratory distress [No Accessory Muscle Use] : no accessory muscle use [Normal Rate and Effort] : normal respiratory rate and effort [Clear to Auscultation] : lungs were clear to auscultation bilaterally [Normal S1, S2] : normal S1 and S2 [Normal Rate] : heart rate was normal [Regular Rhythm] : with a regular rhythm [No Edema] : no peripheral edema [Normal Bowel Sounds] : normal bowel sounds [Not Tender] : non-tender [Not Distended] : not distended [Soft] : abdomen soft [Kyphosis] : kyphosis present [No Stigmata of Cushings Syndrome] : no stigmata of Cushings Syndrome [No Involuntary Movements] : no involuntary movements were seen [Oriented x3] : oriented to person, place, and time [Normal Affect] : the affect was normal [Normal Mood] : the mood was normal [de-identified] : +nodular thyroid gland

## 2023-02-15 NOTE — DATA REVIEWED
[FreeTextEntry1] : Labs 12/2022:\par Prolactin 0.7\par TSH 1.87\par CMP normal except glucose of 104 and ALKP 141\par Testosterone <2.5\par \par Focused Thyroid US 8/17/2022: Right Upper Nodule 1.52 x 1.32 x 0.77; Right Lower Nodule 0.78 x 0.85 x 0.88; Right Parathyroid 1.16 x 0.73 x 0.84; Left Nodule appears to be 2 distinct nodules 0.97 x 0.75 x 0.77 with coarse calcification and smaller 0.5 cm nodule adjacent. Additional b/l subcentimeter cysts and nodules too numerous to characterize. \par \par Labs 7/2022:\par CBC normal\par Ca around 9.0\par A1c 5.8%\par \par Chol 227\par HDL 91\par \par CMP normal except sodium of 134, glucose 56 likely due to hemolysis, ALKP 129\par \par Labs 1/2022:\par Ca 8.0\par \par Focused Thyroid US 8/10/2021: Right Upper Nodule 1.42 x 1.21 x 0.78; Right Lower Nodule 0.87 x 0.83 x 0.86; Right Parathyroid 1.16 x 0.73 x 0.84; Left Nodule 1.28 x 0.77 x 1.05 with coarse calcification. Additional b/l subcentimeter cysts and nodules too numerous to characterize. \par \par Labs 7/2021:\par CMP normal except glucose of 107\par Vit D 26.6\par \par Labs 10/2019:\par CBC normal\par \par HDL 63\par Chol 242\par Trig 188\par CMP normal\par TSH 1.67\par A1c 5.7%\par \par Labs 3/2019:\par \par \par Thyroid Ultrasound Report 1/7/19:\par \par Technique: multiple real time longitudinal and transverse images were obtained using a high resolution ultrasound with a linear transducer, [email or web address] e 2008 model, 10-12 MHz frequencies. All measurements will be reported as longitudinal x sal-posterior x transverse. \par Comparison: Report dated 5/2016. \par \par Indication: thyroid nodule. \par \par Findings: \par The right thyroid lobe measures 3.66 x 1.33 x 1.64 cm. The left thyroid lobe measures 4.12 x 1.21 x 1.68 cm. The isthmus measures 0.25 cm. \par The right thyroid lobe has a heterogeneous parenchyma. \par The left thyroid lobe has a heterogeneous parenchyma . \par  \par In the right mid pole there is a  mixed, heterogenous nodule. It measures 1.33 x 0.77 x 1.17 cm. The nodule is ovoid in shape and the border is distinct. It has peripheral vascularity. \par \par In the right lower pole there is a  mixed. It measures 0.95 x 0.81 x 0.96 cm. The nodule is ovoid in shape and the border is distinct. It has peripheral vascularity. \par \par In the left mid pole there is a  mixed, heterogenous nodule. It measures 1.11 x 1.03 x 0.94 cm. The nodule is ovoid in shape and the border is distinct. It has peripheral and scant internal vascularity. \par \par In the left mid pole there is a  hypoechoic nodule. It measures 0.65 x 0.43 x 0.87 cm. The nodule is ovoid in shape and the border is smooth. The nodule does not have a halo. It demonstrates no calcifications. It has no vascularity. parathyroid adenoma posterior to R lobe 1.18 x 1.08 x 0.96 cm. \par \par In the right upper pole there is a  cyst. It measures 0.75 x 0.56 x 0.68 cm. The nodule is round in shape and the border is smooth. The nodule does not have a halo. It demonstrates no calcifications. It has no vascularity. \par \par Additional bilateral subcentimeter cysts and nodules too numerous to characterize. \par \par \par Labs 9/2018:\par CBC normal\par CMP normal\par A1c 5.8%\par Ca 10\par Corrected Ca 9.5\par \par HDL 61\par Choll 232\par Trig 98\par Vit D 43.2\par Mg 1.9\par \par Labs 7/2018:\par CBC normal\par A1c 5.7%\par CMP normal\par Ca 10\par Corrected 9.6\par PTH 46\par TSH 2.26\par Free T4 1.4\par Phos 2.8\par Vit D 39.8\par Vit D 1,25 37.3\par \par Labs 12/5/17:\par CBC normal\par Mg 1.9\par Glucose 112\par Ca 10\par PTH 36\par CMP otherwise normal\par Phos 3.5\par Chol 228\par Trig 243\par HDL 59\par \par TSH 1.67\par Free T4 1.2\par A1c 5.6%\par Vit D 30.9\par \par \par \par Labs 4/2017:\par Glucose 107\par \par HDL 76\par Chol 245\par Trig 205\par Vit D 31.8\par A1c 6.1%\par \par Labs 11/2016:\par TSH 1.26\par Free T4 1.3\par CBC normal\par A1c 6.0%\par CMP normal except glucose of 115\par \par \par FNA 5/31/16: Benign\par \par Labs 5/2/16:\par PTH 33\par Ca 10.2\par TSH 1.36\par Free T4 1.3\par \par Thyroid US performed 5/2/16:\par \par Indication: thyroid nodule. \par \par Findings: \par The right thyroid lobe measures 3.63 x 1.12 x 1.71 cm. The left thyroid lobe measures 3.73x 1.22 x 1.72 cm. The isthmus measures 0.29 cm. \par  \par In the right lower pole there is a  mixed, heterogenous nodule. It measures 1.29 x 0.71 x 1.1 cm. The nodule is ovoid in shape and the border is distinct. It has peripheral vascularity. \par \par In the right lower pole there is a  mixed. It measures 0.83 0.52 x 0.70 cm. The nodule is ovoid in shape and the border is distinct. It has peripheral vascularity. \par \par In the left lower pole there is a  mixed, heterogenous nodule. It measures 1.38 x 1.02 x 0.82 cm. The nodule is ovoid in shape and the border is distinct. It has peripheral and scant internal vascularity. \par \par parathyroid adenoma posterior to R lobe 1.18 x 1.08 x 0.96 cm. \par \par DEXA performed May 2,2016\par spine not performed due to surgery\par Tot Hip -2.2 (+4.9% change from 2013)\par Fem Neck -2.3 Z(-5.2 % change from 2013)\par Proximal radius -3.4 (-11.3% change from 2013)\par \par DEXA performed September 10, 2013\par spine not performed due to surgery\par Total Hipt -2.5, osteoporosis, stable versus prior study of 2011 (-2.7)\par femoral neck -2.0, osteopenia, stable versus 2011 (-2.3)\par Proximal radius -2.4, osteopenia, no prior

## 2023-02-18 ENCOUNTER — APPOINTMENT (OUTPATIENT)
Dept: MRI IMAGING | Facility: CLINIC | Age: 73
End: 2023-02-18
Payer: MEDICARE

## 2023-02-18 PROCEDURE — 73721 MRI JNT OF LWR EXTRE W/O DYE: CPT | Mod: LT,MH

## 2023-02-22 ENCOUNTER — NON-APPOINTMENT (OUTPATIENT)
Age: 73
End: 2023-02-22

## 2023-02-27 ENCOUNTER — NON-APPOINTMENT (OUTPATIENT)
Age: 73
End: 2023-02-27

## 2023-03-02 ENCOUNTER — APPOINTMENT (OUTPATIENT)
Dept: RHEUMATOLOGY | Facility: CLINIC | Age: 73
End: 2023-03-02
Payer: MEDICARE

## 2023-03-02 ENCOUNTER — NON-APPOINTMENT (OUTPATIENT)
Age: 73
End: 2023-03-02

## 2023-03-02 ENCOUNTER — LABORATORY RESULT (OUTPATIENT)
Age: 73
End: 2023-03-02

## 2023-03-02 VITALS
BODY MASS INDEX: 30.73 KG/M2 | DIASTOLIC BLOOD PRESSURE: 70 MMHG | TEMPERATURE: 97.5 F | SYSTOLIC BLOOD PRESSURE: 120 MMHG | WEIGHT: 180 LBS | OXYGEN SATURATION: 96 % | HEART RATE: 95 BPM | HEIGHT: 64 IN

## 2023-03-02 PROCEDURE — 99204 OFFICE O/P NEW MOD 45 MIN: CPT | Mod: 25

## 2023-03-02 PROCEDURE — 36415 COLL VENOUS BLD VENIPUNCTURE: CPT

## 2023-03-02 RX ORDER — OXYBUTYNIN CHLORIDE 10 MG/1
10 TABLET, EXTENDED RELEASE ORAL
Qty: 30 | Refills: 2 | Status: DISCONTINUED | COMMUNITY
Start: 2022-07-20 | End: 2023-03-02

## 2023-03-02 RX ORDER — TIZANIDINE 2 MG/1
2 TABLET ORAL EVERY 6 HOURS
Qty: 40 | Refills: 0 | Status: DISCONTINUED | COMMUNITY
Start: 2022-08-15 | End: 2023-03-02

## 2023-03-02 RX ORDER — ORAL SEMAGLUTIDE 3 MG/1
3 TABLET ORAL
Qty: 30 | Refills: 5 | Status: DISCONTINUED | COMMUNITY
Start: 2022-12-05 | End: 2023-03-02

## 2023-03-02 RX ORDER — HYDROCODONE BITARTRATE AND HOMATROPINE METHYLBROMIDE 1.5; 5 MG/5ML; MG/5ML
5-1.5 SOLUTION ORAL EVERY 4 HOURS
Qty: 120 | Refills: 0 | Status: DISCONTINUED | COMMUNITY
Start: 2022-12-05 | End: 2023-03-02

## 2023-03-02 RX ORDER — MIRABEGRON 50 MG/1
50 TABLET, FILM COATED, EXTENDED RELEASE ORAL
Qty: 30 | Refills: 0 | Status: DISCONTINUED | COMMUNITY
Start: 2022-10-31 | End: 2023-03-02

## 2023-03-02 RX ORDER — LIDOCAINE 5% 700 MG/1
5 PATCH TOPICAL
Qty: 10 | Refills: 0 | Status: DISCONTINUED | COMMUNITY
Start: 2022-08-15 | End: 2023-03-02

## 2023-03-02 RX ORDER — NAPROXEN 500 MG/1
500 TABLET ORAL
Qty: 60 | Refills: 2 | Status: DISCONTINUED | COMMUNITY
Start: 2022-02-24 | End: 2023-03-02

## 2023-03-02 NOTE — HISTORY OF PRESENT ILLNESS
[FreeTextEntry1] : 73F with OA, restless leg syndrome on pramipexole and methadone, overactive bladder, HTN, HLD, chronic lower back pain. \par \par Presents today with pain in b/l thumbs, elbows, hand pain\par She has left hip pain, s/p hip replacement (R hip replaced in last 2 years), L hip more years ago, but recent MRI 2 weeks ago showing severe gluteal atrophy on L. side. Lumbar spine MRI also showing thoracic paraspinal muscle atrophy. \par  \par referred by Dr. Brantley (physiatrist) to look for underlying condition. \par \par Patient denies overt muscle weakness, denies sicca symptoms, oral ulcers, Raynauds, or hematuria.   [Fever] : no fever [Chills] : no chills [Skin Lesions] : no lesions [Skin Nodules] : no skin nodules [Oral Ulcers] : no oral ulcers [Dry Mouth] : no dry mouth [Shortness of Breath] : no shortness of breath [Chest Pain] : no chest pain [Arthralgias] : arthralgias [Visual Changes] : no visual changes [Eye Pain] : no eye pain [Eye Redness] : no eye redness [Dry Eyes] : no dry eyes

## 2023-03-02 NOTE — PHYSICAL EXAM
[Skin Color & Pigmentation] : normal skin color and pigmentation [Skin Lesions] : no skin lesions [No Focal Deficits] : no focal deficits [Oriented To Time, Place, And Person] : oriented to person, place, and time [Affect] : the affect was normal (4) walks frequently [Mood] : the mood was normal [General Appearance - Alert] : alert [General Appearance - In No Acute Distress] : in no acute distress [General Appearance - Well Developed] : well developed [General Appearance - Well-Appearing] : healthy appearing [Sclera] : the sclera and conjunctiva were normal [Extraocular Movements] : extraocular movements were intact [Neck Appearance] : the appearance of the neck was normal [] : no respiratory distress [Exaggerated Use Of Accessory Muscles For Inspiration] : no accessory muscle use [Heart Sounds] : normal S1 and S2 [Edema] : there was no peripheral edema [FreeTextEntry1] : no joint synovitis; proximal muscle strength grossly preserved, although unable to properly assess LLE strength due to pain at the hip on resisted flexion.

## 2023-03-02 NOTE — ASSESSMENT
[FreeTextEntry1] : 73F with generalized OA s/p R. shoulder replacement, b/l hip and R. knee replacement surgeries, restless leg syndrome, overactive bladder, here for evaluation of multiple joint pains and gluteus muscle atrophy noted on L. hip MRI in 2/2023. \par \par - unclear cause of L. hip pain- she is s/p L. hip replacement surgery and no bony abnormalities were seen on recent MRI; neurovascular structures reported to be intact as well. \par - will do bloodwork to look for autoimmune disease although pt was told suspicion is low given her age. There is no synovitis on exam. \par - advised patient that fatty atrophy of gluteus and paraspinal muscles may be secondary to sedentary lifestyle; MRI of the hip did not show any muscle inflammation so suspicion for inflammatory myositis is low. Will still check creatine kinase levels.\par Overall, continue treatment for generalized osteoarthritis- pt counselled on long term use of NSAIDS- has been on naproxen 500 mg BID for years- advised to half the dose- 250 mg BID- and introduce tylenol arthritis into her regimen (she can take at least two pills daily). Advised use of voltaren gel to affected joints when possible as well to minimize oral NSAID use. \par \par Can follow up in 3 months.

## 2023-03-07 ENCOUNTER — APPOINTMENT (OUTPATIENT)
Dept: ORTHOPEDIC SURGERY | Facility: CLINIC | Age: 73
End: 2023-03-07
Payer: MEDICARE

## 2023-03-07 VITALS
DIASTOLIC BLOOD PRESSURE: 79 MMHG | HEART RATE: 79 BPM | BODY MASS INDEX: 35.14 KG/M2 | WEIGHT: 179 LBS | SYSTOLIC BLOOD PRESSURE: 151 MMHG | HEIGHT: 60 IN | TEMPERATURE: 98.1 F

## 2023-03-07 DIAGNOSIS — M79.643 PAIN IN UNSPECIFIED HAND: ICD-10-CM

## 2023-03-07 DIAGNOSIS — M79.646 PAIN IN UNSPECIFIED HAND: ICD-10-CM

## 2023-03-07 PROCEDURE — 99024 POSTOP FOLLOW-UP VISIT: CPT

## 2023-03-07 NOTE — HISTORY OF PRESENT ILLNESS
[FreeTextEntry1] : Haley returns for follow-up.  She has been wearing her left middle finger mallet splint.  Unfortunately she has not seen occupational therapy.  She has been very diligent in keeping the DIP joint in extension full-time she tells me.

## 2023-03-07 NOTE — ASSESSMENT
[FreeTextEntry1] : ASSESSMENT:\par \par The patient comes in today with known findings of left middle finger mallet finger status post splinting therapy for the last 6 weeks.  At this point I do recommend 4 more weeks of splinting as well as commencement of occupational therapy to avoid adjacent finger stiffness\par [I have diagnosed the patient today with a new diagnosis - This may diminish bodily function for the extremity.] \par \par The patient was adequately and thoroughly informed of my assessment of their current condition(s). \par \par DISCUSSION:\par 1.  She will continue with mallet splinting protocol\par 2.  She will see occupational therapy and we will see each other again in 4 weeks time for removal of her left middle mallet splint\par

## 2023-03-07 NOTE — PHYSICAL EXAM
[de-identified] : She is well-appearing on examination\par \par Examination of the left hand reveals tenderness at the level of the basal joint with obvious osteoarthritic changes clinically.\par Examination of multiple IP joints in the fingers do show arthritic changes.  \par Examination of the left middle finger DIP joint reveals very minimal dorsal skin irritation from the splint.  Upon removal of the splint she is able to keep the DIP joint in full extension neutral

## 2023-03-16 ENCOUNTER — NON-APPOINTMENT (OUTPATIENT)
Age: 73
End: 2023-03-16

## 2023-03-16 LAB
ALBUMIN SERPL ELPH-MCNC: 4.5 G/DL
ALP BLD-CCNC: 162 U/L
ALT SERPL-CCNC: 21 U/L
ANA SER IF-ACNC: NEGATIVE
ANION GAP SERPL CALC-SCNC: 15 MMOL/L
APPEARANCE: CLEAR
AST SERPL-CCNC: 18 U/L
BILIRUB SERPL-MCNC: 0.4 MG/DL
BILIRUBIN URINE: NEGATIVE
BLOOD URINE: NEGATIVE
BUN SERPL-MCNC: 29 MG/DL
C3 SERPL-MCNC: 147 MG/DL
C4 SERPL-MCNC: 36 MG/DL
CALCIUM SERPL-MCNC: 9.4 MG/DL
CCP AB SER IA-ACNC: <8 UNITS
CHLORIDE SERPL-SCNC: 100 MMOL/L
CK SERPL-CCNC: 73 U/L
CO2 SERPL-SCNC: 26 MMOL/L
COLOR: NORMAL
CREAT SERPL-MCNC: 1.02 MG/DL
CRP SERPL-MCNC: <3 MG/L
DSDNA AB SER-ACNC: <12 IU/ML
EGFR: 57 ML/MIN/1.73M2
ENA RNP AB SER IA-ACNC: 0.2 AL
ENA SM AB SER IA-ACNC: <0.2 AL
ENA SS-A AB SER IA-ACNC: <0.2 AL
ENA SS-B AB SER IA-ACNC: <0.2 AL
ERYTHROCYTE [SEDIMENTATION RATE] IN BLOOD BY WESTERGREN METHOD: 33 MM/HR
GLUCOSE QUALITATIVE U: NEGATIVE
GLUCOSE SERPL-MCNC: 124 MG/DL
KETONES URINE: NEGATIVE
LEUKOCYTE ESTERASE URINE: ABNORMAL
NITRITE URINE: NEGATIVE
PH URINE: 6
POTASSIUM SERPL-SCNC: 4.4 MMOL/L
PROT SERPL-MCNC: 6.8 G/DL
PROTEIN URINE: NEGATIVE
RF+CCP IGG SER-IMP: NEGATIVE
RHEUMATOID FACT SER QL: 12 IU/ML
SODIUM SERPL-SCNC: 141 MMOL/L
SPECIFIC GRAVITY URINE: 1.02
UROBILINOGEN URINE: NORMAL

## 2023-03-18 ENCOUNTER — RX RENEWAL (OUTPATIENT)
Age: 73
End: 2023-03-18

## 2023-03-18 DIAGNOSIS — Z86.39 PERSONAL HISTORY OF OTHER ENDOCRINE, NUTRITIONAL AND METABOLIC DISEASE: ICD-10-CM

## 2023-03-20 ENCOUNTER — NON-APPOINTMENT (OUTPATIENT)
Age: 73
End: 2023-03-20

## 2023-03-22 RX ORDER — SEMAGLUTIDE 0.25 MG/.5ML
0.25 INJECTION, SOLUTION SUBCUTANEOUS
Qty: 1 | Refills: 0 | Status: DISCONTINUED | COMMUNITY
Start: 2023-03-15 | End: 2023-03-22

## 2023-03-27 ENCOUNTER — APPOINTMENT (OUTPATIENT)
Dept: UROGYNECOLOGY | Facility: CLINIC | Age: 73
End: 2023-03-27
Payer: MEDICARE

## 2023-03-27 DIAGNOSIS — N39.41 URGE INCONTINENCE: ICD-10-CM

## 2023-03-27 PROCEDURE — 99213 OFFICE O/P EST LOW 20 MIN: CPT

## 2023-03-29 NOTE — PHYSICAL EXAM
[No Acute Distress] : in no acute distress [Well developed] : well developed [Well Nourished] : ~L well nourished [Good Hygeine] : demonstrates good hygeine [Oriented x3] : oriented to person, place, and time [Normal Memory] : ~T memory was ~L unimpaired [Normal Mood/Affect] : mood and affect are normal [Warm and Dry] : was warm and dry to touch [Normal Gait] : gait was normal [Normal] : was normal [Post Void Residual ____ml] : post void residual was [unfilled] ml [FreeTextEntry1] : General: Well, appearing. Alert and orientated. No acute distress\par HEENT: Normocephalic, atraumatic and extraocular muscles appear to be intact \par Neck: Full range of motion, no obvious lymphadenopathy, deformities, or masses noted \par Respiratory: Speaking in full sentences comfortably, normal work of breathing and no cough during visit\par Musculoskeletal: active full range of motion in extremities \par Extremities: No upper extremity edema noted\par Skin: no obvious rash or skin lesions\par Neuro: Orientated X 3, speech is fluent, normal rate\par Psych: Normal mood and affect \par   [Anxiety] : patient is not anxious [Tenderness] : ~T no ~M abdominal tenderness observed [Distended] : not distended [de-identified] : ambulates with walker [de-identified] : no prolapse

## 2023-03-29 NOTE — DISCUSSION/SUMMARY
[FreeTextEntry1] : UUI:\par We discussed other treatment options for her UUI. We discussed PTNS and Botox.\par Patient would like to try Botox. Reviewed procedure, risks and benefits.\par Rx for Nitrofurantoin sent to pt's pharmacy. Advised pt to start taking antibiotics 3 days prior to procedure.\par All of pt' questions and concerns were addressed.\par Pt will follow up 6/21/23 for Botox procedure.\par \par \par Advised pt to call the office with any questions or concerns.\par \par

## 2023-03-29 NOTE — HISTORY OF PRESENT ILLNESS
[FreeTextEntry1] : Haley is a 74 y/o that presents to the office today for f/u for OAB and UUI. She was taking Myrbetriq, but had stopped taking due to elevated BP's. She was then switched to Gemtesa 75 mg PO once a day. She has been taking this medication for about a month with no improvement with her UUI. She reports that she has only been drinking water when home and she is following the diet and behavioral modifications. She reports getting up once during the night. She does report that she feels like she is emptying her bladder completely. \par

## 2023-04-05 ENCOUNTER — APPOINTMENT (OUTPATIENT)
Dept: ORTHOPEDIC SURGERY | Facility: CLINIC | Age: 73
End: 2023-04-05

## 2023-04-07 ENCOUNTER — APPOINTMENT (OUTPATIENT)
Dept: ORTHOPEDIC SURGERY | Facility: CLINIC | Age: 73
End: 2023-04-07
Payer: MEDICARE

## 2023-04-07 VITALS
HEART RATE: 92 BPM | WEIGHT: 179 LBS | BODY MASS INDEX: 35.14 KG/M2 | SYSTOLIC BLOOD PRESSURE: 133 MMHG | DIASTOLIC BLOOD PRESSURE: 75 MMHG | HEIGHT: 60 IN

## 2023-04-07 PROCEDURE — 99213 OFFICE O/P EST LOW 20 MIN: CPT | Mod: 24

## 2023-04-07 NOTE — HISTORY OF PRESENT ILLNESS
[FreeTextEntry1] : Haley returns for follow-up of known left-sided middle finger mallet.  She has been wearing the mallet splint for over 10 weeks now.

## 2023-04-07 NOTE — ASSESSMENT
[FreeTextEntry1] : I am delighted to see that she is doing so well.  She has done great with mallet finger splinting.  At this point I believe she will do well with very low risk of recurrence although we did talk about this possibility.\par \par Discussion:\par \par 1.  For now she will progress to only nighttime mallet splinting for the next 4 weeks.\par #2 we will see each other on an as-needed basis

## 2023-04-07 NOTE — PHYSICAL EXAM
[de-identified] : She is well-appearing on examination\par \par Examination of the left middle finger DIP joint reveals very minimal dorsal skin irritation from the splint.  Upon removal of the splint she is able to keep the DIP joint in full extension neutral.  She has slight stiffness subjectively with motion.

## 2023-05-16 ENCOUNTER — RX RENEWAL (OUTPATIENT)
Age: 73
End: 2023-05-16

## 2023-06-21 ENCOUNTER — APPOINTMENT (OUTPATIENT)
Dept: UROGYNECOLOGY | Facility: CLINIC | Age: 73
End: 2023-06-21

## 2023-07-06 ENCOUNTER — APPOINTMENT (OUTPATIENT)
Dept: INTERNAL MEDICINE | Facility: CLINIC | Age: 73
End: 2023-07-06
Payer: MEDICARE

## 2023-07-06 PROCEDURE — 90677 PCV20 VACCINE IM: CPT

## 2023-07-06 PROCEDURE — 99213 OFFICE O/P EST LOW 20 MIN: CPT | Mod: 25

## 2023-07-06 PROCEDURE — G0009: CPT

## 2023-07-06 RX ORDER — SEMAGLUTIDE 1.34 MG/ML
2 INJECTION, SOLUTION SUBCUTANEOUS
Qty: 4 | Refills: 3 | Status: DISCONTINUED | COMMUNITY
Start: 2023-03-22 | End: 2023-07-06

## 2023-07-06 RX ORDER — AZELASTINE HYDROCHLORIDE 137 UG/1
0.1 SPRAY, METERED NASAL TWICE DAILY
Qty: 1 | Refills: 3 | Status: ACTIVE | COMMUNITY
Start: 2023-07-06 | End: 1900-01-01

## 2023-07-07 NOTE — ASSESSMENT
[FreeTextEntry1] : Will give Prevnar\par Needs to get Shingrix a pharmacy\par INcrease Ozempic\par Trial of Astelin nasal\par Name of orthopedist given. \par \par \par 24 minutes spent in preparation of this visit, including review of previous notes and test results, interview and examination of patient, discussion of plan, arranging for appropriate testing and treatment, and documentation.\par

## 2023-07-07 NOTE — HISTORY OF PRESENT ILLNESS
[de-identified] : Presents for several issues. \par Requests Prevnar and Shingrix.\par Minimal weight loss on Ozempic, 0.5 mg.  Some side effects with belching.\par NAsal itching. \par Would like a second opinion on hip.  Had THR but does not feel she has good ROM.

## 2023-07-11 ENCOUNTER — NON-APPOINTMENT (OUTPATIENT)
Age: 73
End: 2023-07-11

## 2023-07-14 ENCOUNTER — APPOINTMENT (OUTPATIENT)
Dept: UROGYNECOLOGY | Facility: CLINIC | Age: 73
End: 2023-07-14
Payer: MEDICARE

## 2023-07-14 ENCOUNTER — OUTPATIENT (OUTPATIENT)
Dept: OUTPATIENT SERVICES | Facility: HOSPITAL | Age: 73
LOS: 1 days | End: 2023-07-14
Payer: MEDICARE

## 2023-07-14 VITALS — SYSTOLIC BLOOD PRESSURE: 144 MMHG | OXYGEN SATURATION: 97 % | HEART RATE: 95 BPM | DIASTOLIC BLOOD PRESSURE: 77 MMHG

## 2023-07-14 DIAGNOSIS — Z98.890 OTHER SPECIFIED POSTPROCEDURAL STATES: Chronic | ICD-10-CM

## 2023-07-14 DIAGNOSIS — Z01.818 ENCOUNTER FOR OTHER PREPROCEDURAL EXAMINATION: ICD-10-CM

## 2023-07-14 DIAGNOSIS — Z96.651 PRESENCE OF RIGHT ARTIFICIAL KNEE JOINT: Chronic | ICD-10-CM

## 2023-07-14 DIAGNOSIS — Z98.49 CATARACT EXTRACTION STATUS, UNSPECIFIED EYE: Chronic | ICD-10-CM

## 2023-07-14 LAB
BILIRUB UR QL STRIP: NORMAL
CLARITY UR: CLEAR
COLLECTION METHOD: NORMAL
GLUCOSE UR-MCNC: NORMAL
HCG UR QL: 0.2 EU/DL
HGB UR QL STRIP.AUTO: ABNORMAL
KETONES UR-MCNC: NORMAL
LEUKOCYTE ESTERASE UR QL STRIP: NORMAL
NITRITE UR QL STRIP: NORMAL
PH UR STRIP: 6
PROT UR STRIP-MCNC: NORMAL
SP GR UR STRIP: 1.02

## 2023-07-14 PROCEDURE — 52287 CYSTOSCOPY CHEMODENERVATION: CPT

## 2023-07-27 NOTE — H&P ADULT - PROBLEM/PLAN-3
DISPLAY PLAN FREE TEXT Partial Purse String (Simple) Text: Given the location of the defect and the characteristics of the surrounding skin a simple purse string closure was deemed most appropriate.  Undermining was performed circumfirentially around the surgical defect.  A purse string suture was then placed and tightened. Wound tension only allowed a partial closure of the circular defect.

## 2023-08-04 ENCOUNTER — APPOINTMENT (OUTPATIENT)
Dept: UROGYNECOLOGY | Facility: CLINIC | Age: 73
End: 2023-08-04
Payer: MEDICARE

## 2023-08-04 ENCOUNTER — RX RENEWAL (OUTPATIENT)
Age: 73
End: 2023-08-04

## 2023-08-04 DIAGNOSIS — N32.81 OVERACTIVE BLADDER: ICD-10-CM

## 2023-08-04 PROCEDURE — 99213 OFFICE O/P EST LOW 20 MIN: CPT

## 2023-08-04 RX ORDER — FAMOTIDINE 40 MG/1
40 TABLET, FILM COATED ORAL
Qty: 30 | Refills: 6 | Status: ACTIVE | COMMUNITY
Start: 2021-10-18 | End: 1900-01-01

## 2023-08-04 NOTE — HISTORY OF PRESENT ILLNESS
[FreeTextEntry1] : Haley is a 74 y/o with OAB/UUI. She is s/p Botox on 7/14/23. She presents to the office today for a PVR. She reports 60-70% improvement in her symptoms. She rarely gets up at night to void and she is having less UUI during the day. She reports that prior she was wetting her clothes and now she is not. She is happy with the results from the Botox.

## 2023-08-04 NOTE — PHYSICAL EXAM
[No Acute Distress] : in no acute distress [Well developed] : well developed [Well Nourished] : ~L well nourished [Good Hygeine] : demonstrates good hygeine [Oriented x3] : oriented to person, place, and time [Normal Memory] : ~T memory was ~L unimpaired [Normal Mood/Affect] : mood and affect are normal [Warm and Dry] : was warm and dry to touch [Post Void Residual ____ml] : post void residual was [unfilled] ml [Anxiety] : patient is not anxious [de-identified] : ambulates with walker

## 2023-08-04 NOTE — DISCUSSION/SUMMARY
[FreeTextEntry1] : OAB/UUI: PVR is normal Reviewed diet and behavioral modifications Advised patient to call the office if her symptoms start to return.  Will f/u in 6 months or sooner if needed.

## 2023-08-16 ENCOUNTER — APPOINTMENT (OUTPATIENT)
Dept: ENDOCRINOLOGY | Facility: CLINIC | Age: 73
End: 2023-08-16
Payer: MEDICARE

## 2023-08-16 ENCOUNTER — APPOINTMENT (OUTPATIENT)
Dept: ENDOCRINOLOGY | Facility: CLINIC | Age: 73
End: 2023-08-16

## 2023-08-16 VITALS
DIASTOLIC BLOOD PRESSURE: 75 MMHG | HEART RATE: 80 BPM | TEMPERATURE: 97.4 F | SYSTOLIC BLOOD PRESSURE: 150 MMHG | WEIGHT: 169 LBS | HEIGHT: 60 IN | BODY MASS INDEX: 33.18 KG/M2 | OXYGEN SATURATION: 98 % | RESPIRATION RATE: 15 BRPM

## 2023-08-16 PROCEDURE — 96372 THER/PROPH/DIAG INJ SC/IM: CPT

## 2023-08-16 PROCEDURE — 99214 OFFICE O/P EST MOD 30 MIN: CPT | Mod: 25

## 2023-08-16 NOTE — PROCEDURE
[[employer] e 2008 model, 10-12 MHz frequencies] : multiple real time longitudinal and transverse images were obtained using a high resolution ultrasound with a linear transducer, [employer] e 2008 model, 10-12 MHz frequencies. All measurements will be reported as longitudinal x sal-posterior x transverse. [Report dated ___] : Report dated [unfilled] [Thyroid Nodule] : thyroid nodule [] : a heterogeneous parenchyma  [Lower] : lower pole there is a  [Peripheral vascularity] : peripheral vascularity [Mixed] : mixed [Heterogeneous] : heterogenous nodule [Distinct] : distinct [Peripheral and scant  internal vascularity] : peripheral and scant internal vascularity [Left Thyroid] : left [Mid] : mid pole there is a  [Hypoechoic] : hypoechoic nodule [Ovoid] : ovoid in shape [Right Thyroid] : right [Upper] : upper pole there is a  [Cyst] : cyst [Round] : round in shape [Smooth] : smooth [No] : does not have a halo [No calcifications] : no calcifications [No vascularity] : no vascularity [FreeTextEntry1] : 3.66 x 1.33 x 1.64 [FreeTextEntry5] : 4.12 x 1.21 x 1.68 [FreeTextEntry2] : 0.25 [FreeTextEntry4] : parathyroid adenoma posterior to R lobe 1.18 x 1.08 x 0.96 cm [FreeTextEntry3] : 0.75 x 0.56 x 0.68 [de-identified] : Additional bilateral subcentimeter cysts and nodules too numerous to characterize.

## 2023-08-16 NOTE — DATA REVIEWED
[FreeTextEntry1] : Labs 3/2023: CMP normal except glucose of 124 ALKP 162  Labs 12/2022: Prolactin 0.7 TSH 1.87 CMP normal except glucose of 104 and ALKP 141 Testosterone <2.5  Focused Thyroid US 8/17/2022: Right Upper Nodule 1.52 x 1.32 x 0.77; Right Lower Nodule 0.78 x 0.85 x 0.88; Right Parathyroid 1.16 x 0.73 x 0.84; Left Nodule appears to be 2 distinct nodules 0.97 x 0.75 x 0.77 with coarse calcification and smaller 0.5 cm nodule adjacent. Additional b/l subcentimeter cysts and nodules too numerous to characterize.   Labs 7/2022: CBC normal Ca around 9.0 A1c 5.8%  Chol 227 HDL 91  CMP normal except sodium of 134, glucose 56 likely due to hemolysis, ALKP 129  Labs 1/2022: Ca 8.0  Focused Thyroid US 8/10/2021: Right Upper Nodule 1.42 x 1.21 x 0.78; Right Lower Nodule 0.87 x 0.83 x 0.86; Right Parathyroid 1.16 x 0.73 x 0.84; Left Nodule 1.28 x 0.77 x 1.05 with coarse calcification. Additional b/l subcentimeter cysts and nodules too numerous to characterize.   Labs 7/2021: CMP normal except glucose of 107 Vit D 26.6  Labs 10/2019: CBC normal  HDL 63 Chol 242 Trig 188 CMP normal TSH 1.67 A1c 5.7%  Labs 3/2019:   Thyroid Ultrasound Report 1/7/19:  Technique: multiple real time longitudinal and transverse images were obtained using a high resolution ultrasound with a linear transducer, [email or web address] e 2008 model, 10-12 MHz frequencies. All measurements will be reported as longitudinal x sal-posterior x transverse.  Comparison: Report dated 5/2016.   Indication: thyroid nodule.   Findings:  The right thyroid lobe measures 3.66 x 1.33 x 1.64 cm. The left thyroid lobe measures 4.12 x 1.21 x 1.68 cm. The isthmus measures 0.25 cm.  The right thyroid lobe has a heterogeneous parenchyma.  The left thyroid lobe has a heterogeneous parenchyma .    In the right mid pole there is a  mixed, heterogenous nodule. It measures 1.33 x 0.77 x 1.17 cm. The nodule is ovoid in shape and the border is distinct. It has peripheral vascularity.   In the right lower pole there is a  mixed. It measures 0.95 x 0.81 x 0.96 cm. The nodule is ovoid in shape and the border is distinct. It has peripheral vascularity.   In the left mid pole there is a  mixed, heterogenous nodule. It measures 1.11 x 1.03 x 0.94 cm. The nodule is ovoid in shape and the border is distinct. It has peripheral and scant internal vascularity.   In the left mid pole there is a  hypoechoic nodule. It measures 0.65 x 0.43 x 0.87 cm. The nodule is ovoid in shape and the border is smooth. The nodule does not have a halo. It demonstrates no calcifications. It has no vascularity. parathyroid adenoma posterior to R lobe 1.18 x 1.08 x 0.96 cm.   In the right upper pole there is a  cyst. It measures 0.75 x 0.56 x 0.68 cm. The nodule is round in shape and the border is smooth. The nodule does not have a halo. It demonstrates no calcifications. It has no vascularity.   Additional bilateral subcentimeter cysts and nodules too numerous to characterize.    Labs 9/2018: CBC normal CMP normal A1c 5.8% Ca 10 Corrected Ca 9.5  HDL 61 Choll 232 Trig 98 Vit D 43.2 Mg 1.9  Labs 7/2018: CBC normal A1c 5.7% CMP normal Ca 10 Corrected 9.6 PTH 46 TSH 2.26 Free T4 1.4 Phos 2.8 Vit D 39.8 Vit D 1,25 37.3  Labs 12/5/17: CBC normal Mg 1.9 Glucose 112 Ca 10 PTH 36 CMP otherwise normal Phos 3.5 Chol 228 Trig 243 HDL 59  TSH 1.67 Free T4 1.2 A1c 5.6% Vit D 30.9    Labs 4/2017: Glucose 107  HDL 76 Chol 245 Trig 205 Vit D 31.8 A1c 6.1%  Labs 11/2016: TSH 1.26 Free T4 1.3 CBC normal A1c 6.0% CMP normal except glucose of 115   FNA 5/31/16: Benign  Labs 5/2/16: PTH 33 Ca 10.2 TSH 1.36 Free T4 1.3  Thyroid US performed 5/2/16:  Indication: thyroid nodule.   Findings:  The right thyroid lobe measures 3.63 x 1.12 x 1.71 cm. The left thyroid lobe measures 3.73x 1.22 x 1.72 cm. The isthmus measures 0.29 cm.    In the right lower pole there is a  mixed, heterogenous nodule. It measures 1.29 x 0.71 x 1.1 cm. The nodule is ovoid in shape and the border is distinct. It has peripheral vascularity.   In the right lower pole there is a  mixed. It measures 0.83 0.52 x 0.70 cm. The nodule is ovoid in shape and the border is distinct. It has peripheral vascularity.   In the left lower pole there is a  mixed, heterogenous nodule. It measures 1.38 x 1.02 x 0.82 cm. The nodule is ovoid in shape and the border is distinct. It has peripheral and scant internal vascularity.   parathyroid adenoma posterior to R lobe 1.18 x 1.08 x 0.96 cm.   DEXA performed May 2,2016 spine not performed due to surgery Tot Hip -2.2 (+4.9% change from 2013) Fem Neck -2.3 Z(-5.2 % change from 2013) Proximal radius -3.4 (-11.3% change from 2013)  DEXA performed September 10, 2013 spine not performed due to surgery Total Hipt -2.5, osteoporosis, stable versus prior study of 2011 (-2.7) femoral neck -2.0, osteopenia, stable versus 2011 (-2.3) Proximal radius -2.4, osteopenia, no prior

## 2023-08-16 NOTE — ASSESSMENT
[Carbohydrate Consistent Diet] : carbohydrate consistent diet [Importance of Diet and Exercise] : importance of diet and exercise to improve glycemic control, achieve weight loss and improve cardiovascular health [FreeTextEntry1] : 73-year-old F w/  1. B/L Thyroid nodules - Thyroid FNA of Left nodule (please refer to official US report) benign. Repeat focused thyroid U/S performed 8/2021 stable compared to 1/2019 and stable compared to 2016 (see results section for full description). Clinically euthyroid. Repeat thyroid US 8/2022 stable. Can repeat in 2024.   2. Osteoporosis - DXA repeated 6/2017 with results as noted above with stable/mild improvement in proximal radius T score of -3.1 from -3.4 (-11.3% change from 2013). s/p drug holiday for ~ 7 years. Started on prolia 1st dose 5/24/16. 2nd dose 11/29/16. 3rd Prolia 6/2017. 4th dose 12/2017. 5th dose 7/2018. 6th dose 1/2019. 7th dose of Prolia given 9/2019 (BUY AND BILL). 8th injection 1/2021. 9th injection 7/2021. 10th injection 2/2022. 11th injection 8/2022. 12th injection 2/2023. 13th injection today. Last DEXA 1/2021 generally stable with improvement in the femoral neck. Due for DEXA. Script/referral given today. If new fractures on Prolia therapy or worsening BMD would recommend switch treatment to Romozosumab. Check calcium and vitamin D levels today.   3. Prediabetes-Lifestyle modification advised. Now on ozempic. Check A1c.    4. HTN- c/w chlorthalidone and losartan

## 2023-08-16 NOTE — PHYSICAL EXAM
[Alert] : alert [Well Nourished] : well nourished [Healthy Appearance] : healthy appearance [No Acute Distress] : no acute distress [Normal Voice/Communication] : normal voice communication [EOMI] : extra ocular movement intact [No Proptosis] : no proptosis [Supple] : the neck was supple [No Respiratory Distress] : no respiratory distress [No Accessory Muscle Use] : no accessory muscle use [Normal Rate and Effort] : normal respiratory rate and effort [Clear to Auscultation] : lungs were clear to auscultation bilaterally [Normal S1, S2] : normal S1 and S2 [Normal Rate] : heart rate was normal [Regular Rhythm] : with a regular rhythm [No Edema] : no peripheral edema [Normal Bowel Sounds] : normal bowel sounds [Not Tender] : non-tender [Not Distended] : not distended [Soft] : abdomen soft [Kyphosis] : kyphosis present [No Stigmata of Cushings Syndrome] : no stigmata of Cushings Syndrome [No Involuntary Movements] : no involuntary movements were seen [Oriented x3] : oriented to person, place, and time [Normal Affect] : the affect was normal [Normal Mood] : the mood was normal [de-identified] : +nodular thyroid gland

## 2023-08-16 NOTE — REVIEW OF SYSTEMS
[Fatigue] : fatigue [Recent Weight Loss (___ Lbs)] : recent weight loss: [unfilled] lbs [Constipation] : constipation [Joint Pain] : joint pain [Back Pain] : back pain [All other systems negative] : All other systems negative [Recent Weight Gain (___ Lbs)] : no recent weight gain [Visual Field Defect] : no visual field defect [Dysphagia] : no dysphagia [Neck Pain] : no neck pain [Dysphonia] : no dysphonia [Chest Pain] : no chest pain [Shortness Of Breath] : no shortness of breath [Nausea] : no nausea [Vomiting] : no vomiting [Diarrhea] : no diarrhea [Polyuria] : no polyuria [Polydipsia] : no polydipsia [FreeTextEntry8] : Menopause

## 2023-08-16 NOTE — HISTORY OF PRESENT ILLNESS
[Prolia (Denosumab)] : Prolia (Denosumab) [Premat. Menopause (surg)] : premature menopause which occurred surgically [High Fall Risk] : high fall risk [Frequent Falls] : frequent falls [Uses a Walker/Cane] : use of a walker/cane [Regular Dental Follow-Up] : regular dental follow-up [History of Blood Clots] : history of blood clots [FreeTextEntry1] : 73-year-old F with PMH osteoporosis, thyroid nodules, prediabetes presents for follow up.  Osteoporosis: Seen in 2013 for osteoporosis. Told of osteopenia approximately in 2005. Was treated from [id number] with Fosamax and then stopped due to esophagitis. DXA 2013 stable - drug holiday continued. No history of fractures. Mother did not have hip fx. Patient smoked 1-2 PPD x 20 yrs - quit 1982. No prolonged steroid use. No recent steroids. Premature menopause at age 33 s/p RAJIV for fibroids. She has a history of several orthopedic procedures including spinal fusion 1966 and multiple further spinal surgeries. History of left hip replacement 2010 and right shoulder replacement 2012 for arthritis.  Taking Vit D 2000 IU QD. Now taking calcium. Seen initially by me 5/2/16 with repeat DXA with significant decline in BMD of 1/3 radius from -2.4 in 2013 to -3.4. PTH not elevated, but parathyroid adenoma possible visualized on thyroid U/S. Recommended prolia s/p 1st injection 5/24/16, 2nd injection 11/2016. 3rd injection 6/2017. 4th injection 12/18/17. 5th injection 7/2/18. 6th injection 1/2019. 7th injection 9/2019. Was due for 8th injection 3/2020, but postponed due to COVID. 8th injection given 1/2021.  9th injection 7/2021. 10th injection 2/2022 after calcium repeated to ensure no hypocalcemia. 11th injection 8/2022, 12th injection 2/2023. Here for 13th injection. Has fallen since last visit with left finger fracture. Was following with dental. No upcoming dental work. Has hx of dental implants.  DXA 6/2017 total spine -2.1, 1/3 radius -3.1, Hip -2.1. Last DXA 1/2021 stable. Had right hip replacement 1/2022. Due for DEXA. +chronic back, LE, and hip pain.   B/L Thyroid Nodules: Outpatient Thyroid US performed 3/2016 revealed B/L thyroid nodules, with heterogenous, hypoechoic solid nodule of the L midpole measuring 1.5 x 1.1 x 0.8cm;  heterogenous, hypoechoic solid nodule of the R mid-lower pole measuring 1.3 x 0.8 x 1.1 cm; and  heterogenous, hypoechoic solid nodule of the RLL measuring 1.3 x 1.4 x 1.0 cm.  Nodules were found incidentally on work up for falls. No personal h/o radiation to the head or neck. No h/o abnormal thyroid blood work. No voice changes. + dysphagia after paraesophageal hernia repair. Father had thyroid nodule - unknown if malignant. Seen initially 5/2016 with thyroid U/S confirming dominant left thyroid nodule. Recommended FNA performed 5/2016 with benign results. Repeat thyroid US 1/2019 and 8/2022 stable. Patient reports occasional hand tremors. Denies palpitations. Denies heat or cold intolerance. +weight loss now on Ozempic. Also with some constipation. No h/o of amiodarone or lithium use. No changes to the neck.   On chronic methadone for restless leg syndrome plans on coming off methadone.  [Taking Steroids] : no past or present history of taking steroids [Kidney Stones] : no history of kidney stones [Excessive Alcohol Intake] : no past or present history of excessive alcohol intake [Current Smoking___(ppd)] : not currently smoking [Family History of Osteoporosis] : no family history of osteoporosis [Family History of Breast Cancer] : no family history of breast cancer [Family History of Hip Fracture] : no family history of hip fracture [Hyperparathyroidism] : no hyperparathyroidism [History of Radiation Therapy] : no history of radiation therapy [Previous Fragility Fracture] : no previous fragility fracture

## 2023-08-17 DIAGNOSIS — E55.9 VITAMIN D DEFICIENCY, UNSPECIFIED: ICD-10-CM

## 2023-08-17 LAB
24R-OH-CALCIDIOL SERPL-MCNC: 62.6 PG/ML
25(OH)D3 SERPL-MCNC: 14.2 NG/ML
ALBUMIN SERPL ELPH-MCNC: 4.6 G/DL
ALP BLD-CCNC: 134 U/L
ALT SERPL-CCNC: 17 U/L
ANION GAP SERPL CALC-SCNC: 12 MMOL/L
AST SERPL-CCNC: 22 U/L
BASOPHILS # BLD AUTO: 0.04 K/UL
BASOPHILS NFR BLD AUTO: 0.7 %
BILIRUB SERPL-MCNC: 0.5 MG/DL
BUN SERPL-MCNC: 24 MG/DL
CALCIUM SERPL-MCNC: 10 MG/DL
CALCIUM SERPL-MCNC: 10 MG/DL
CHLORIDE SERPL-SCNC: 101 MMOL/L
CHOLEST SERPL-MCNC: 169 MG/DL
CO2 SERPL-SCNC: 28 MMOL/L
CREAT SERPL-MCNC: 1.09 MG/DL
EGFR: 53 ML/MIN/1.73M2
EOSINOPHIL # BLD AUTO: 0.12 K/UL
EOSINOPHIL NFR BLD AUTO: 2 %
ESTIMATED AVERAGE GLUCOSE: 120 MG/DL
GLUCOSE SERPL-MCNC: 91 MG/DL
HBA1C MFR BLD HPLC: 5.8 %
HCT VFR BLD CALC: 39.5 %
HDLC SERPL-MCNC: 78 MG/DL
HGB BLD-MCNC: 12.6 G/DL
IMM GRANULOCYTES NFR BLD AUTO: 0.3 %
LDLC SERPL CALC-MCNC: 75 MG/DL
LYMPHOCYTES # BLD AUTO: 1.32 K/UL
LYMPHOCYTES NFR BLD AUTO: 21.5 %
MAN DIFF?: NORMAL
MCHC RBC-ENTMCNC: 29.1 PG
MCHC RBC-ENTMCNC: 31.9 GM/DL
MCV RBC AUTO: 91.2 FL
MONOCYTES # BLD AUTO: 0.33 K/UL
MONOCYTES NFR BLD AUTO: 5.4 %
NEUTROPHILS # BLD AUTO: 4.3 K/UL
NEUTROPHILS NFR BLD AUTO: 70.1 %
NONHDLC SERPL-MCNC: 91 MG/DL
PARATHYROID HORMONE INTACT: 55 PG/ML
PLATELET # BLD AUTO: 269 K/UL
POTASSIUM SERPL-SCNC: 4.5 MMOL/L
PROT SERPL-MCNC: 6.8 G/DL
RBC # BLD: 4.33 M/UL
RBC # FLD: 13.8 %
SODIUM SERPL-SCNC: 141 MMOL/L
T4 FREE SERPL-MCNC: 1.4 NG/DL
TRIGL SERPL-MCNC: 89 MG/DL
TSH SERPL-ACNC: 0.86 UIU/ML
WBC # FLD AUTO: 6.13 K/UL

## 2023-08-17 RX ORDER — ERGOCALCIFEROL 1.25 MG/1
1.25 MG CAPSULE ORAL
Qty: 12 | Refills: 3 | Status: ACTIVE | COMMUNITY
Start: 2023-08-17 | End: 1900-01-01

## 2023-08-28 ENCOUNTER — APPOINTMENT (OUTPATIENT)
Dept: NEUROLOGY | Facility: CLINIC | Age: 73
End: 2023-08-28
Payer: MEDICARE

## 2023-08-28 VITALS
HEIGHT: 62 IN | SYSTOLIC BLOOD PRESSURE: 166 MMHG | HEART RATE: 87 BPM | WEIGHT: 172 LBS | OXYGEN SATURATION: 96 % | DIASTOLIC BLOOD PRESSURE: 96 MMHG | BODY MASS INDEX: 31.65 KG/M2

## 2023-08-28 VITALS — HEART RATE: 93 BPM | SYSTOLIC BLOOD PRESSURE: 164 MMHG | DIASTOLIC BLOOD PRESSURE: 89 MMHG

## 2023-08-28 DIAGNOSIS — G25.81 RESTLESS LEGS SYNDROME: ICD-10-CM

## 2023-08-28 PROCEDURE — 99215 OFFICE O/P EST HI 40 MIN: CPT

## 2023-08-28 NOTE — HISTORY OF PRESENT ILLNESS
[FreeTextEntry1] : Mrs French is a 73-year-old female who presents with chief complaints of restless leg syndrome Patient states that she has had symptoms since 1990.  Her symptoms are present in the legs arms and whole body.  She is unable to clearly describe it but it feels like ants are moving all over her and she needs to keep moving to relieve the discomfort.  When her legs are affected she is able to get up and walk and symptoms get slightly better.  She has seen multiple providers for this.  She was started on methadone 30 mg.  But over time has slowly titrated herself down to less than 5 mg a day.  Nights are still the worst in terms of symptoms.  Currently she is on pramipexole 3 mg at bedtime.  In the past she has tried gabapentin not sure how it affected her.  Ferritin level checked last year 74.  She denies any side effects on pramipexole She also has history of chronic back pain leg pain and has had 9 spinal surgeries.  Pain is overall manageable at present time. She denies use of SSRIs or alcohol  Review of systems a complete review of systems is performed and is negative except as listed in HPI Family history significant for RLS and mom and son

## 2023-08-28 NOTE — PHYSICAL EXAM
[FreeTextEntry1] : Patient is awake and alert.  She is pleasant cooperative with the exam Face is symmetric tongue and uvula midline and symmetric.  Extraocular meds are intact Strength is grossly intact in all extremities No resting action or postural tremors are seen No bradykinesia no rigidity no dysmetria or dysdiadochokinesia Has difficulty getting up from the chair posture is quite stooped and ambulates with a walker

## 2023-08-28 NOTE — DISCUSSION/SUMMARY
[FreeTextEntry1] : Impression RLS since 1990s, Georgette history of RLS, ferritin checked in 2022 -74 Plan We will increase and change pramipexole from 3 mg to 3.75 mg XL Side effects were discussed in detail with the patient.  She will let me know in a week or 2 how she is doing if needed this dose could be increased further or gabapentin/Horizant could be added in Patient is trying by herself to taper off methadone All questions were addressed and answered Follow-up in 3 to 4 months

## 2023-09-06 ENCOUNTER — RX RENEWAL (OUTPATIENT)
Age: 73
End: 2023-09-06

## 2023-09-06 RX ORDER — ATORVASTATIN CALCIUM 40 MG/1
40 TABLET, FILM COATED ORAL
Qty: 90 | Refills: 3 | Status: ACTIVE | COMMUNITY
Start: 2022-09-23 | End: 1900-01-01

## 2023-09-13 ENCOUNTER — APPOINTMENT (OUTPATIENT)
Dept: UROGYNECOLOGY | Facility: CLINIC | Age: 73
End: 2023-09-13
Payer: MEDICARE

## 2023-09-13 DIAGNOSIS — N39.41 URGE INCONTINENCE: ICD-10-CM

## 2023-09-13 DIAGNOSIS — T84.84XA PAIN DUE TO INTERNAL ORTHOPEDIC PROSTHETIC DEVICES, IMPLANTS AND GRAFTS, INITIAL ENCOUNTER: ICD-10-CM

## 2023-09-13 DIAGNOSIS — Z96.651 PAIN DUE TO INTERNAL ORTHOPEDIC PROSTHETIC DEVICES, IMPLANTS AND GRAFTS, INITIAL ENCOUNTER: ICD-10-CM

## 2023-09-13 LAB
BILIRUB UR QL STRIP: NEGATIVE
CLARITY UR: CLEAR
COLLECTION METHOD: NORMAL
GLUCOSE UR-MCNC: NEGATIVE
HCG UR QL: 0.2 EU/DL
HGB UR QL STRIP.AUTO: NEGATIVE
KETONES UR-MCNC: NEGATIVE
LEUKOCYTE ESTERASE UR QL STRIP: NEGATIVE
NITRITE UR QL STRIP: NEGATIVE
PH UR STRIP: 5.5
PROT UR STRIP-MCNC: NEGATIVE
SP GR UR STRIP: 1.02

## 2023-09-13 PROCEDURE — 81003 URINALYSIS AUTO W/O SCOPE: CPT | Mod: QW

## 2023-09-13 PROCEDURE — 99213 OFFICE O/P EST LOW 20 MIN: CPT | Mod: 25

## 2023-09-13 PROCEDURE — 51701 INSERT BLADDER CATHETER: CPT

## 2023-09-19 ENCOUNTER — NON-APPOINTMENT (OUTPATIENT)
Age: 73
End: 2023-09-19

## 2023-09-20 ENCOUNTER — RX RENEWAL (OUTPATIENT)
Age: 73
End: 2023-09-20

## 2023-09-27 ENCOUNTER — APPOINTMENT (OUTPATIENT)
Age: 73
End: 2023-09-27
Payer: MEDICARE

## 2023-09-27 ENCOUNTER — OUTPATIENT (OUTPATIENT)
Dept: OUTPATIENT SERVICES | Facility: HOSPITAL | Age: 73
LOS: 1 days | End: 2023-09-27
Payer: MEDICARE

## 2023-09-27 VITALS — RESPIRATION RATE: 14 BRPM | HEART RATE: 84 BPM | SYSTOLIC BLOOD PRESSURE: 100 MMHG | DIASTOLIC BLOOD PRESSURE: 60 MMHG

## 2023-09-27 DIAGNOSIS — Z12.83 ENCOUNTER FOR SCREENING FOR MALIGNANT NEOPLASM OF SKIN: ICD-10-CM

## 2023-09-27 DIAGNOSIS — I10 ESSENTIAL (PRIMARY) HYPERTENSION: ICD-10-CM

## 2023-09-27 DIAGNOSIS — M54.50 LOW BACK PAIN, UNSPECIFIED: ICD-10-CM

## 2023-09-27 DIAGNOSIS — Z96.651 PRESENCE OF RIGHT ARTIFICIAL KNEE JOINT: ICD-10-CM

## 2023-09-27 DIAGNOSIS — F03.90 UNSPECIFIED DEMENTIA W/OUT BEHAVIORAL DISTURBANCE: ICD-10-CM

## 2023-09-27 DIAGNOSIS — M20.019 MALLET FINGER OF UNSPECIFIED FINGER(S): ICD-10-CM

## 2023-09-27 DIAGNOSIS — Z96.651 PRESENCE OF RIGHT ARTIFICIAL KNEE JOINT: Chronic | ICD-10-CM

## 2023-09-27 DIAGNOSIS — Z86.39 PERSONAL HISTORY OF OTHER ENDOCRINE, NUTRITIONAL AND METABOLIC DISEASE: ICD-10-CM

## 2023-09-27 DIAGNOSIS — Z87.39 PERSONAL HISTORY OF OTHER DISEASES OF THE MUSCULOSKELETAL SYSTEM AND CONNECTIVE TISSUE: ICD-10-CM

## 2023-09-27 DIAGNOSIS — M25.559 PAIN IN UNSPECIFIED HIP: ICD-10-CM

## 2023-09-27 DIAGNOSIS — Z87.19 PERSONAL HISTORY OF OTHER DISEASES OF THE DIGESTIVE SYSTEM: ICD-10-CM

## 2023-09-27 DIAGNOSIS — M17.12 UNILATERAL PRIMARY OSTEOARTHRITIS, LEFT KNEE: ICD-10-CM

## 2023-09-27 DIAGNOSIS — Z87.898 PERSONAL HISTORY OF OTHER SPECIFIED CONDITIONS: ICD-10-CM

## 2023-09-27 DIAGNOSIS — N39.41 URGE INCONTINENCE: ICD-10-CM

## 2023-09-27 DIAGNOSIS — H92.02 OTALGIA, LEFT EAR: ICD-10-CM

## 2023-09-27 DIAGNOSIS — Z96.641 PRESENCE OF RIGHT ARTIFICIAL HIP JOINT: ICD-10-CM

## 2023-09-27 DIAGNOSIS — Z98.890 OTHER SPECIFIED POSTPROCEDURAL STATES: Chronic | ICD-10-CM

## 2023-09-27 DIAGNOSIS — Z96.642 PRESENCE OF LEFT ARTIFICIAL HIP JOINT: ICD-10-CM

## 2023-09-27 DIAGNOSIS — Z00.00 ENCOUNTER FOR GENERAL ADULT MEDICAL EXAMINATION W/OUT ABNORMAL FINDINGS: ICD-10-CM

## 2023-09-27 DIAGNOSIS — Z98.49 CATARACT EXTRACTION STATUS, UNSPECIFIED EYE: Chronic | ICD-10-CM

## 2023-09-27 DIAGNOSIS — B37.2 CANDIDIASIS OF SKIN AND NAIL: ICD-10-CM

## 2023-09-27 DIAGNOSIS — H61.22 IMPACTED CERUMEN, LEFT EAR: ICD-10-CM

## 2023-09-27 PROCEDURE — G0439: CPT | Mod: 25

## 2023-09-27 PROCEDURE — G0439: CPT

## 2023-09-27 PROCEDURE — G0008: CPT

## 2023-09-27 PROCEDURE — 90662 IIV NO PRSV INCREASED AG IM: CPT

## 2023-09-27 RX ORDER — BLOOD PRESSURE TEST KIT-LARGE
KIT MISCELLANEOUS
Qty: 1 | Refills: 0 | Status: ACTIVE | COMMUNITY
Start: 2023-09-27 | End: 1900-01-01

## 2023-09-27 RX ORDER — DIAZEPAM 2 MG/1
2 TABLET ORAL
Qty: 1 | Refills: 0 | Status: DISCONTINUED | COMMUNITY
Start: 2022-08-15 | End: 2023-09-27

## 2023-09-27 RX ORDER — PHENTERMINE HYDROCHLORIDE 37.5 MG/1
37.5 CAPSULE ORAL DAILY
Qty: 30 | Refills: 5 | Status: DISCONTINUED | COMMUNITY
Start: 2023-03-18 | End: 2023-09-27

## 2023-09-27 RX ORDER — METHADONE HYDROCHLORIDE 5 MG/1
5 TABLET ORAL TWICE DAILY
Refills: 0 | Status: DISCONTINUED | COMMUNITY
Start: 2021-11-02 | End: 2023-09-27

## 2023-09-27 RX ORDER — NITROFURANTOIN (MONOHYDRATE/MACROCRYSTALS) 25; 75 MG/1; MG/1
100 CAPSULE ORAL
Qty: 14 | Refills: 0 | Status: DISCONTINUED | COMMUNITY
Start: 2023-03-27 | End: 2023-09-27

## 2023-09-27 RX ORDER — KETOCONAZOLE 20.5 MG/ML
2 SHAMPOO, SUSPENSION TOPICAL
Qty: 1 | Refills: 5 | Status: DISCONTINUED | COMMUNITY
Start: 2023-01-19 | End: 2023-09-27

## 2023-09-27 RX ORDER — ONDANSETRON 4 MG/1
4 TABLET ORAL 3 TIMES DAILY
Qty: 15 | Refills: 1 | Status: DISCONTINUED | COMMUNITY
Start: 2021-06-04 | End: 2023-09-27

## 2023-09-29 LAB
25(OH)D3 SERPL-MCNC: 38.5 NG/ML
ALBUMIN SERPL ELPH-MCNC: 4.5 G/DL
ALP BLD-CCNC: 139 U/L
ALT SERPL-CCNC: 19 U/L
ANION GAP SERPL CALC-SCNC: 13 MMOL/L
AST SERPL-CCNC: 21 U/L
BASOPHILS # BLD AUTO: 0.05 K/UL
BASOPHILS NFR BLD AUTO: 1.1 %
BILIRUB SERPL-MCNC: 0.3 MG/DL
BUN SERPL-MCNC: 24 MG/DL
CALCIUM SERPL-MCNC: 9.5 MG/DL
CHLORIDE SERPL-SCNC: 101 MMOL/L
CHOLEST SERPL-MCNC: 164 MG/DL
CO2 SERPL-SCNC: 26 MMOL/L
CREAT SERPL-MCNC: 1.03 MG/DL
EGFR: 57 ML/MIN/1.73M2
EOSINOPHIL # BLD AUTO: 0.09 K/UL
EOSINOPHIL NFR BLD AUTO: 2 %
ESTIMATED AVERAGE GLUCOSE: 123 MG/DL
GLUCOSE SERPL-MCNC: 103 MG/DL
HBA1C MFR BLD HPLC: 5.9 %
HCT VFR BLD CALC: 41.4 %
HDLC SERPL-MCNC: 81 MG/DL
HGB BLD-MCNC: 12.9 G/DL
IMM GRANULOCYTES NFR BLD AUTO: 0.4 %
LDLC SERPL CALC-MCNC: 67 MG/DL
LYMPHOCYTES # BLD AUTO: 0.9 K/UL
LYMPHOCYTES NFR BLD AUTO: 20.1 %
MAN DIFF?: NORMAL
MCHC RBC-ENTMCNC: 28.4 PG
MCHC RBC-ENTMCNC: 31.2 GM/DL
MCV RBC AUTO: 91.2 FL
MONOCYTES # BLD AUTO: 0.56 K/UL
MONOCYTES NFR BLD AUTO: 12.5 %
NEUTROPHILS # BLD AUTO: 2.86 K/UL
NEUTROPHILS NFR BLD AUTO: 63.9 %
NONHDLC SERPL-MCNC: 83 MG/DL
PLATELET # BLD AUTO: 246 K/UL
POTASSIUM SERPL-SCNC: 4.3 MMOL/L
PROT SERPL-MCNC: 6.9 G/DL
RBC # BLD: 4.54 M/UL
RBC # FLD: 13.9 %
SODIUM SERPL-SCNC: 140 MMOL/L
TRIGL SERPL-MCNC: 85 MG/DL
WBC # FLD AUTO: 4.48 K/UL

## 2023-10-05 ENCOUNTER — EMERGENCY (EMERGENCY)
Facility: HOSPITAL | Age: 73
LOS: 1 days | Discharge: HOME | End: 2023-10-05
Attending: EMERGENCY MEDICINE
Payer: MEDICARE

## 2023-10-05 VITALS
HEART RATE: 77 BPM | OXYGEN SATURATION: 98 % | RESPIRATION RATE: 16 BRPM | DIASTOLIC BLOOD PRESSURE: 86 MMHG | SYSTOLIC BLOOD PRESSURE: 166 MMHG | TEMPERATURE: 98 F

## 2023-10-05 DIAGNOSIS — Z98.49 CATARACT EXTRACTION STATUS, UNSPECIFIED EYE: Chronic | ICD-10-CM

## 2023-10-05 DIAGNOSIS — Z96.651 PRESENCE OF RIGHT ARTIFICIAL KNEE JOINT: Chronic | ICD-10-CM

## 2023-10-05 DIAGNOSIS — Z98.890 OTHER SPECIFIED POSTPROCEDURAL STATES: Chronic | ICD-10-CM

## 2023-10-05 LAB
ALBUMIN SERPL ELPH-MCNC: 4.3 G/DL — SIGNIFICANT CHANGE UP (ref 3.3–5.2)
ALP SERPL-CCNC: 121 U/L — HIGH (ref 40–120)
ALT FLD-CCNC: 21 U/L — SIGNIFICANT CHANGE UP
ANION GAP SERPL CALC-SCNC: 16 MMOL/L — SIGNIFICANT CHANGE UP (ref 5–17)
AST SERPL-CCNC: 22 U/L — SIGNIFICANT CHANGE UP
BASOPHILS # BLD AUTO: 0.03 K/UL — SIGNIFICANT CHANGE UP (ref 0–0.2)
BASOPHILS NFR BLD AUTO: 0.5 % — SIGNIFICANT CHANGE UP (ref 0–2)
BILIRUB SERPL-MCNC: 0.6 MG/DL — SIGNIFICANT CHANGE UP (ref 0.4–2)
BUN SERPL-MCNC: 19.5 MG/DL — SIGNIFICANT CHANGE UP (ref 8–20)
CALCIUM SERPL-MCNC: 9.6 MG/DL — SIGNIFICANT CHANGE UP (ref 8.4–10.5)
CHLORIDE SERPL-SCNC: 96 MMOL/L — SIGNIFICANT CHANGE UP (ref 96–108)
CO2 SERPL-SCNC: 24 MMOL/L — SIGNIFICANT CHANGE UP (ref 22–29)
CREAT SERPL-MCNC: 0.86 MG/DL — SIGNIFICANT CHANGE UP (ref 0.5–1.3)
EGFR: 70 ML/MIN/1.73M2 — SIGNIFICANT CHANGE UP
EOSINOPHIL # BLD AUTO: 0 K/UL — SIGNIFICANT CHANGE UP (ref 0–0.5)
EOSINOPHIL NFR BLD AUTO: 0 % — SIGNIFICANT CHANGE UP (ref 0–6)
GLUCOSE SERPL-MCNC: 135 MG/DL — HIGH (ref 70–99)
HCT VFR BLD CALC: 38.7 % — SIGNIFICANT CHANGE UP (ref 34.5–45)
HGB BLD-MCNC: 12.5 G/DL — SIGNIFICANT CHANGE UP (ref 11.5–15.5)
IMM GRANULOCYTES NFR BLD AUTO: 1 % — HIGH (ref 0–0.9)
LIDOCAIN IGE QN: 78 U/L — HIGH (ref 22–51)
LYMPHOCYTES # BLD AUTO: 0.66 K/UL — LOW (ref 1–3.3)
LYMPHOCYTES # BLD AUTO: 11 % — LOW (ref 13–44)
MCHC RBC-ENTMCNC: 27.9 PG — SIGNIFICANT CHANGE UP (ref 27–34)
MCHC RBC-ENTMCNC: 32.3 GM/DL — SIGNIFICANT CHANGE UP (ref 32–36)
MCV RBC AUTO: 86.4 FL — SIGNIFICANT CHANGE UP (ref 80–100)
MONOCYTES # BLD AUTO: 0.11 K/UL — SIGNIFICANT CHANGE UP (ref 0–0.9)
MONOCYTES NFR BLD AUTO: 1.8 % — LOW (ref 2–14)
NEUTROPHILS # BLD AUTO: 5.14 K/UL — SIGNIFICANT CHANGE UP (ref 1.8–7.4)
NEUTROPHILS NFR BLD AUTO: 85.7 % — HIGH (ref 43–77)
PLATELET # BLD AUTO: 289 K/UL — SIGNIFICANT CHANGE UP (ref 150–400)
POTASSIUM SERPL-MCNC: 3.3 MMOL/L — LOW (ref 3.5–5.3)
POTASSIUM SERPL-SCNC: 3.3 MMOL/L — LOW (ref 3.5–5.3)
PROT SERPL-MCNC: 7.3 G/DL — SIGNIFICANT CHANGE UP (ref 6.6–8.7)
RBC # BLD: 4.48 M/UL — SIGNIFICANT CHANGE UP (ref 3.8–5.2)
RBC # FLD: 13.1 % — SIGNIFICANT CHANGE UP (ref 10.3–14.5)
SODIUM SERPL-SCNC: 136 MMOL/L — SIGNIFICANT CHANGE UP (ref 135–145)
WBC # BLD: 6 K/UL — SIGNIFICANT CHANGE UP (ref 3.8–10.5)
WBC # FLD AUTO: 6 K/UL — SIGNIFICANT CHANGE UP (ref 3.8–10.5)

## 2023-10-05 PROCEDURE — 99284 EMERGENCY DEPT VISIT MOD MDM: CPT

## 2023-10-05 RX ORDER — METOCLOPRAMIDE HCL 10 MG
10 TABLET ORAL ONCE
Refills: 0 | Status: DISCONTINUED | OUTPATIENT
Start: 2023-10-05 | End: 2023-10-05

## 2023-10-05 RX ORDER — METOCLOPRAMIDE 5 MG/1
5 TABLET ORAL 3 TIMES DAILY
Qty: 90 | Refills: 1 | Status: ACTIVE | COMMUNITY
Start: 2023-10-05 | End: 1900-01-01

## 2023-10-05 RX ORDER — SODIUM CHLORIDE 9 MG/ML
1000 INJECTION, SOLUTION INTRAVENOUS ONCE
Refills: 0 | Status: COMPLETED | OUTPATIENT
Start: 2023-10-05 | End: 2023-10-05

## 2023-10-05 RX ORDER — ACETAMINOPHEN 500 MG
1000 TABLET ORAL ONCE
Refills: 0 | Status: COMPLETED | OUTPATIENT
Start: 2023-10-05 | End: 2023-10-05

## 2023-10-05 RX ORDER — ONDANSETRON 8 MG/1
4 TABLET, FILM COATED ORAL ONCE
Refills: 0 | Status: COMPLETED | OUTPATIENT
Start: 2023-10-05 | End: 2023-10-05

## 2023-10-05 RX ORDER — METOCLOPRAMIDE HCL 10 MG
10 TABLET ORAL ONCE
Refills: 0 | Status: COMPLETED | OUTPATIENT
Start: 2023-10-05 | End: 2023-10-05

## 2023-10-05 RX ADMIN — SODIUM CHLORIDE 2000 MILLILITER(S): 9 INJECTION, SOLUTION INTRAVENOUS at 22:02

## 2023-10-05 RX ADMIN — ONDANSETRON 4 MILLIGRAM(S): 8 TABLET, FILM COATED ORAL at 22:02

## 2023-10-05 RX ADMIN — Medication 10 MILLIGRAM(S): at 22:35

## 2023-10-05 RX ADMIN — Medication 400 MILLIGRAM(S): at 23:32

## 2023-10-05 NOTE — ED PROVIDER NOTE - OBJECTIVE STATEMENT
73 year old female with history of right hip OA presents To ED for 1 day of abdominal cramps nonbloody nonbilious nausea vomiting.  Denies diarrhea.  Symptoms started after breakfast today.  She has a past medical history that includes  PMHs includes HTN, depression, RLS on Methadone, allergic rhinitis, scoliosis s/p multiple spine surgeries .  denies melena or hematochezia no fevers.  No hematemesis.  Denies taking blood thinners.  Has history of hiatal hernia repair multiple back surgeries.  Denies taking blood thinners.  No alcohol abuse.  No prior treatment.  No recent high risk travel.  No sick contacts.

## 2023-10-05 NOTE — ED PROVIDER NOTE - PROGRESS NOTE DETAILS
Abdomen soft nontender nondistended do not suspect small bowel obstruction clinically she is passing gas.  Treating her nausea.  No CVA tenderness.  No urinary symptoms.  We will hydrate and reassess patient and patient's son at bedside agree to plan of care

## 2023-10-05 NOTE — ED ADULT NURSE NOTE - NSICDXPASTMEDICALHX_GEN_ALL_CORE_FT
PAST MEDICAL HISTORY:  2019 novel coronavirus disease (COVID-19) Dec 2020- hospitalized for 3 days, did not require home O2, denies residual symptoms    Depression     Essential hypertension     Fungal infection of skin under breast    GERD (gastroesophageal reflux disease)     H/O scoliosis     H/O seasonal allergies     History of pulmonary embolism developed after billings abigail surgery,1984 treated with coumadin 3 months, no issues after    OA (osteoarthritis)     Osteoporosis     Restless leg syndrome     Right hip pain

## 2023-10-05 NOTE — ED PROVIDER NOTE - PATIENT PORTAL LINK FT
You can access the FollowMyHealth Patient Portal offered by [facility] by registering at the following website: [email or web address]. By joining [facility]’s FollowMyHealth portal, you will also be able to view your health information using other applications (apps) compatible with our system.

## 2023-10-05 NOTE — ED ADULT NURSE NOTE - NSFALLUNIVINTERV_ED_ALL_ED
Bed/Stretcher in lowest position, wheels locked, appropriate side rails in place/Call bell, personal items and telephone in reach/Instruct patient to call for assistance before getting out of bed/chair/stretcher/Non-slip footwear applied when patient is off stretcher/[place] to call system/Physically safe environment - no spills, clutter or unnecessary equipment/Purposeful proactive rounding/Room/bathroom lighting operational, light cord in reach

## 2023-10-05 NOTE — ED ADULT NURSE NOTE - OBJECTIVE STATEMENT
Presents to ed with complaints of vomiting and severe nausea since 1pm, patient says" after I had lunch is when I started to feel nauseous". Patient complaining of abdominal cramps. Denies chest pain, denies sob, denies dizziness, denies blood in stool. PMHX of spinal fusion.

## 2023-10-05 NOTE — ED ADULT NURSE NOTE - NSICDXPASTSURGICALHX_GEN_ALL_CORE_FT
PAST SURGICAL HISTORY:  H/O arthroscopy of knee June 2021    H/O total knee replacement, right October 2020    Removal of pin, plate, abigail, or screw 1991- removal of billings abigail    S/P cataract surgery     S/P dilatation and curettage 1981    S/P hip replacement left (2008)    S/P hysterectomy 1982    S/P repair of paraesophageal hernia 2001    S/P shoulder surgery right (2012)    S/P spinal fusion L4-L5 1966    S/P spinal surgery x 2 1985, 1986    Scoliosis s/p placement of billings abigail x 2 1984

## 2023-10-05 NOTE — ED PROVIDER NOTE - MDM ORDERS SUBMITTED SELECTION
Luis with [facility] Labs

## 2023-10-06 ENCOUNTER — INPATIENT (INPATIENT)
Facility: HOSPITAL | Age: 73
LOS: 2 days | Discharge: HOME | DRG: 641 | End: 2023-10-09
Attending: STUDENT IN AN ORGANIZED HEALTH CARE EDUCATION/TRAINING PROGRAM | Admitting: STUDENT IN AN ORGANIZED HEALTH CARE EDUCATION/TRAINING PROGRAM
Payer: MEDICARE

## 2023-10-06 VITALS
SYSTOLIC BLOOD PRESSURE: 151 MMHG | OXYGEN SATURATION: 97 % | TEMPERATURE: 99 F | RESPIRATION RATE: 18 BRPM | WEIGHT: 175.05 LBS | HEART RATE: 69 BPM | DIASTOLIC BLOOD PRESSURE: 83 MMHG

## 2023-10-06 VITALS
DIASTOLIC BLOOD PRESSURE: 84 MMHG | HEART RATE: 75 BPM | SYSTOLIC BLOOD PRESSURE: 153 MMHG | RESPIRATION RATE: 18 BRPM | TEMPERATURE: 99 F | OXYGEN SATURATION: 96 %

## 2023-10-06 DIAGNOSIS — R10.9 UNSPECIFIED ABDOMINAL PAIN: ICD-10-CM

## 2023-10-06 DIAGNOSIS — J30.9 ALLERGIC RHINITIS, UNSPECIFIED: ICD-10-CM

## 2023-10-06 DIAGNOSIS — N32.81 OVERACTIVE BLADDER: ICD-10-CM

## 2023-10-06 DIAGNOSIS — E86.0 DEHYDRATION: ICD-10-CM

## 2023-10-06 DIAGNOSIS — E78.5 HYPERLIPIDEMIA, UNSPECIFIED: ICD-10-CM

## 2023-10-06 DIAGNOSIS — K21.9 GASTRO-ESOPHAGEAL REFLUX DISEASE WITHOUT ESOPHAGITIS: ICD-10-CM

## 2023-10-06 DIAGNOSIS — E87.6 HYPOKALEMIA: ICD-10-CM

## 2023-10-06 DIAGNOSIS — E66.9 OBESITY, UNSPECIFIED: ICD-10-CM

## 2023-10-06 DIAGNOSIS — R52 PAIN, UNSPECIFIED: ICD-10-CM

## 2023-10-06 DIAGNOSIS — Z96.651 PRESENCE OF RIGHT ARTIFICIAL KNEE JOINT: Chronic | ICD-10-CM

## 2023-10-06 DIAGNOSIS — Z98.890 OTHER SPECIFIED POSTPROCEDURAL STATES: Chronic | ICD-10-CM

## 2023-10-06 DIAGNOSIS — I10 ESSENTIAL (PRIMARY) HYPERTENSION: ICD-10-CM

## 2023-10-06 DIAGNOSIS — Z98.49 CATARACT EXTRACTION STATUS, UNSPECIFIED EYE: Chronic | ICD-10-CM

## 2023-10-06 LAB
ALBUMIN SERPL ELPH-MCNC: 4.2 G/DL — SIGNIFICANT CHANGE UP (ref 3.3–5)
ALP SERPL-CCNC: 108 U/L — SIGNIFICANT CHANGE UP (ref 40–120)
ALT FLD-CCNC: 22 U/L — SIGNIFICANT CHANGE UP (ref 10–45)
ANION GAP SERPL CALC-SCNC: 12 MMOL/L — SIGNIFICANT CHANGE UP (ref 5–17)
ANION GAP SERPL CALC-SCNC: 15 MMOL/L — SIGNIFICANT CHANGE UP (ref 5–17)
APPEARANCE UR: CLEAR — SIGNIFICANT CHANGE UP
APTT BLD: 26.8 SEC — SIGNIFICANT CHANGE UP (ref 24.5–35.6)
AST SERPL-CCNC: 40 U/L — SIGNIFICANT CHANGE UP (ref 10–40)
BACTERIA # UR AUTO: NEGATIVE — SIGNIFICANT CHANGE UP
BASE EXCESS BLDV CALC-SCNC: 2.1 MMOL/L — SIGNIFICANT CHANGE UP (ref -2–3)
BASOPHILS # BLD AUTO: 0.02 K/UL — SIGNIFICANT CHANGE UP (ref 0–0.2)
BASOPHILS NFR BLD AUTO: 0.2 % — SIGNIFICANT CHANGE UP (ref 0–2)
BILIRUB SERPL-MCNC: 0.8 MG/DL — SIGNIFICANT CHANGE UP (ref 0.2–1.2)
BILIRUB UR-MCNC: NEGATIVE — SIGNIFICANT CHANGE UP
BUN SERPL-MCNC: 13 MG/DL — SIGNIFICANT CHANGE UP (ref 7–23)
BUN SERPL-MCNC: 14 MG/DL — SIGNIFICANT CHANGE UP (ref 7–23)
CA-I SERPL-SCNC: 1.13 MMOL/L — LOW (ref 1.15–1.33)
CALCIUM SERPL-MCNC: 8.7 MG/DL — SIGNIFICANT CHANGE UP (ref 8.4–10.5)
CALCIUM SERPL-MCNC: 8.9 MG/DL — SIGNIFICANT CHANGE UP (ref 8.4–10.5)
CHLORIDE BLDV-SCNC: 93 MMOL/L — LOW (ref 96–108)
CHLORIDE SERPL-SCNC: 90 MMOL/L — LOW (ref 96–108)
CHLORIDE SERPL-SCNC: 91 MMOL/L — LOW (ref 96–108)
CO2 BLDV-SCNC: 29 MMOL/L — HIGH (ref 22–26)
CO2 SERPL-SCNC: 24 MMOL/L — SIGNIFICANT CHANGE UP (ref 22–31)
CO2 SERPL-SCNC: 26 MMOL/L — SIGNIFICANT CHANGE UP (ref 22–31)
COLOR SPEC: SIGNIFICANT CHANGE UP
CREAT SERPL-MCNC: 0.79 MG/DL — SIGNIFICANT CHANGE UP (ref 0.5–1.3)
CREAT SERPL-MCNC: 0.8 MG/DL — SIGNIFICANT CHANGE UP (ref 0.5–1.3)
DIFF PNL FLD: ABNORMAL
EGFR: 77 ML/MIN/1.73M2 — SIGNIFICANT CHANGE UP
EGFR: 78 ML/MIN/1.73M2 — SIGNIFICANT CHANGE UP
EOSINOPHIL # BLD AUTO: 0 K/UL — SIGNIFICANT CHANGE UP (ref 0–0.5)
EOSINOPHIL NFR BLD AUTO: 0 % — SIGNIFICANT CHANGE UP (ref 0–6)
EPI CELLS # UR: 8 /HPF — HIGH
GAS PNL BLDV: 126 MMOL/L — LOW (ref 136–145)
GAS PNL BLDV: SIGNIFICANT CHANGE UP
GLUCOSE BLDV-MCNC: 107 MG/DL — HIGH (ref 70–99)
GLUCOSE SERPL-MCNC: 111 MG/DL — HIGH (ref 70–99)
GLUCOSE SERPL-MCNC: 113 MG/DL — HIGH (ref 70–99)
GLUCOSE UR QL: NEGATIVE — SIGNIFICANT CHANGE UP
HCO3 BLDV-SCNC: 28 MMOL/L — SIGNIFICANT CHANGE UP (ref 22–29)
HCT VFR BLD CALC: 34.5 % — SIGNIFICANT CHANGE UP (ref 34.5–45)
HCT VFR BLDA CALC: 33 % — LOW (ref 34.5–46.5)
HGB BLD CALC-MCNC: 11.1 G/DL — LOW (ref 11.7–16.1)
HGB BLD-MCNC: 11.4 G/DL — LOW (ref 11.5–15.5)
HYALINE CASTS # UR AUTO: 1 /LPF — SIGNIFICANT CHANGE UP (ref 0–2)
IMM GRANULOCYTES NFR BLD AUTO: 0.7 % — SIGNIFICANT CHANGE UP (ref 0–0.9)
INR BLD: 1.07 RATIO — SIGNIFICANT CHANGE UP (ref 0.85–1.18)
KETONES UR-MCNC: NEGATIVE — SIGNIFICANT CHANGE UP
LACTATE BLDV-MCNC: 1.2 MMOL/L — SIGNIFICANT CHANGE UP (ref 0.5–2)
LEUKOCYTE ESTERASE UR-ACNC: ABNORMAL
LIDOCAIN IGE QN: 60 U/L — SIGNIFICANT CHANGE UP (ref 7–60)
LYMPHOCYTES # BLD AUTO: 1.09 K/UL — SIGNIFICANT CHANGE UP (ref 1–3.3)
LYMPHOCYTES # BLD AUTO: 12.7 % — LOW (ref 13–44)
MAGNESIUM SERPL-MCNC: 1.5 MG/DL — LOW (ref 1.6–2.6)
MAGNESIUM SERPL-MCNC: 1.6 MG/DL — SIGNIFICANT CHANGE UP (ref 1.6–2.6)
MCHC RBC-ENTMCNC: 28.4 PG — SIGNIFICANT CHANGE UP (ref 27–34)
MCHC RBC-ENTMCNC: 33 GM/DL — SIGNIFICANT CHANGE UP (ref 32–36)
MCV RBC AUTO: 86 FL — SIGNIFICANT CHANGE UP (ref 80–100)
MONOCYTES # BLD AUTO: 0.41 K/UL — SIGNIFICANT CHANGE UP (ref 0–0.9)
MONOCYTES NFR BLD AUTO: 4.8 % — SIGNIFICANT CHANGE UP (ref 2–14)
NEUTROPHILS # BLD AUTO: 7.03 K/UL — SIGNIFICANT CHANGE UP (ref 1.8–7.4)
NEUTROPHILS NFR BLD AUTO: 81.6 % — HIGH (ref 43–77)
NITRITE UR-MCNC: NEGATIVE — SIGNIFICANT CHANGE UP
NRBC # BLD: 0 /100 WBCS — SIGNIFICANT CHANGE UP (ref 0–0)
PCO2 BLDV: 47 MMHG — HIGH (ref 39–42)
PH BLDV: 7.38 — SIGNIFICANT CHANGE UP (ref 7.32–7.43)
PH UR: 7 — SIGNIFICANT CHANGE UP (ref 5–8)
PLATELET # BLD AUTO: 280 K/UL — SIGNIFICANT CHANGE UP (ref 150–400)
PO2 BLDV: 24 MMHG — LOW (ref 25–45)
POTASSIUM BLDV-SCNC: 2.5 MMOL/L — CRITICAL LOW (ref 3.5–5.1)
POTASSIUM SERPL-MCNC: 3 MMOL/L — LOW (ref 3.5–5.3)
POTASSIUM SERPL-MCNC: 3.1 MMOL/L — LOW (ref 3.5–5.3)
POTASSIUM SERPL-SCNC: 3 MMOL/L — LOW (ref 3.5–5.3)
POTASSIUM SERPL-SCNC: 3.1 MMOL/L — LOW (ref 3.5–5.3)
PROT SERPL-MCNC: 6.7 G/DL — SIGNIFICANT CHANGE UP (ref 6–8.3)
PROT UR-MCNC: NEGATIVE — SIGNIFICANT CHANGE UP
PROTHROM AB SERPL-ACNC: 11.2 SEC — SIGNIFICANT CHANGE UP (ref 9.5–13)
RBC # BLD: 4.01 M/UL — SIGNIFICANT CHANGE UP (ref 3.8–5.2)
RBC # FLD: 12.8 % — SIGNIFICANT CHANGE UP (ref 10.3–14.5)
RBC CASTS # UR COMP ASSIST: 3 /HPF — SIGNIFICANT CHANGE UP (ref 0–4)
SAO2 % BLDV: 29.2 % — LOW (ref 67–88)
SODIUM SERPL-SCNC: 129 MMOL/L — LOW (ref 135–145)
SODIUM SERPL-SCNC: 129 MMOL/L — LOW (ref 135–145)
SP GR SPEC: 1.03 — HIGH (ref 1.01–1.02)
TROPONIN T, HIGH SENSITIVITY RESULT: 25 NG/L — SIGNIFICANT CHANGE UP (ref 0–51)
UROBILINOGEN FLD QL: NEGATIVE — SIGNIFICANT CHANGE UP
WBC # BLD: 8.61 K/UL — SIGNIFICANT CHANGE UP (ref 3.8–10.5)
WBC # FLD AUTO: 8.61 K/UL — SIGNIFICANT CHANGE UP (ref 3.8–10.5)
WBC UR QL: 12 /HPF — HIGH (ref 0–5)

## 2023-10-06 PROCEDURE — 99223 1ST HOSP IP/OBS HIGH 75: CPT

## 2023-10-06 PROCEDURE — 83690 ASSAY OF LIPASE: CPT

## 2023-10-06 PROCEDURE — 80053 COMPREHEN METABOLIC PANEL: CPT

## 2023-10-06 PROCEDURE — 99284 EMERGENCY DEPT VISIT MOD MDM: CPT | Mod: 25

## 2023-10-06 PROCEDURE — 70450 CT HEAD/BRAIN W/O DYE: CPT | Mod: 26,MA

## 2023-10-06 PROCEDURE — 74177 CT ABD & PELVIS W/CONTRAST: CPT | Mod: 26,MA

## 2023-10-06 PROCEDURE — 99285 EMERGENCY DEPT VISIT HI MDM: CPT | Mod: FS

## 2023-10-06 PROCEDURE — 85025 COMPLETE CBC W/AUTO DIFF WBC: CPT

## 2023-10-06 PROCEDURE — 36415 COLL VENOUS BLD VENIPUNCTURE: CPT

## 2023-10-06 PROCEDURE — 96375 TX/PRO/DX INJ NEW DRUG ADDON: CPT

## 2023-10-06 PROCEDURE — 96374 THER/PROPH/DIAG INJ IV PUSH: CPT

## 2023-10-06 PROCEDURE — 96376 TX/PRO/DX INJ SAME DRUG ADON: CPT

## 2023-10-06 RX ORDER — MIRABEGRON 50 MG/1
25 TABLET, EXTENDED RELEASE ORAL DAILY
Refills: 0 | Status: DISCONTINUED | OUTPATIENT
Start: 2023-10-06 | End: 2023-10-09

## 2023-10-06 RX ORDER — LINACLOTIDE 145 UG/1
1 CAPSULE, GELATIN COATED ORAL
Qty: 0 | Refills: 0 | DISCHARGE

## 2023-10-06 RX ORDER — DEXTROSE MONOHYDRATE, SODIUM CHLORIDE, AND POTASSIUM CHLORIDE 50; .745; 4.5 G/1000ML; G/1000ML; G/1000ML
1000 INJECTION, SOLUTION INTRAVENOUS
Refills: 0 | Status: DISCONTINUED | OUTPATIENT
Start: 2023-10-06 | End: 2023-10-07

## 2023-10-06 RX ORDER — MONTELUKAST 4 MG/1
10 TABLET, CHEWABLE ORAL DAILY
Refills: 0 | Status: DISCONTINUED | OUTPATIENT
Start: 2023-10-06 | End: 2023-10-09

## 2023-10-06 RX ORDER — ONDANSETRON 8 MG/1
1 TABLET, FILM COATED ORAL
Qty: 15 | Refills: 0
Start: 2023-10-06 | End: 2023-10-10

## 2023-10-06 RX ORDER — DULOXETINE HYDROCHLORIDE 30 MG/1
1 CAPSULE, DELAYED RELEASE ORAL
Qty: 0 | Refills: 0 | DISCHARGE

## 2023-10-06 RX ORDER — ONDANSETRON 8 MG/1
4 TABLET, FILM COATED ORAL EVERY 8 HOURS
Refills: 0 | Status: DISCONTINUED | OUTPATIENT
Start: 2023-10-06 | End: 2023-10-09

## 2023-10-06 RX ORDER — ENOXAPARIN SODIUM 100 MG/ML
40 INJECTION SUBCUTANEOUS EVERY 24 HOURS
Refills: 0 | Status: DISCONTINUED | OUTPATIENT
Start: 2023-10-06 | End: 2023-10-09

## 2023-10-06 RX ORDER — LANOLIN ALCOHOL/MO/W.PET/CERES
3 CREAM (GRAM) TOPICAL AT BEDTIME
Refills: 0 | Status: DISCONTINUED | OUTPATIENT
Start: 2023-10-06 | End: 2023-10-07

## 2023-10-06 RX ORDER — FLUTICASONE PROPIONATE 50 MCG
1 SPRAY, SUSPENSION NASAL
Refills: 0 | Status: DISCONTINUED | OUTPATIENT
Start: 2023-10-06 | End: 2023-10-09

## 2023-10-06 RX ORDER — ONDANSETRON 8 MG/1
4 TABLET, FILM COATED ORAL ONCE
Refills: 0 | Status: COMPLETED | OUTPATIENT
Start: 2023-10-06 | End: 2023-10-06

## 2023-10-06 RX ORDER — SODIUM CHLORIDE 9 MG/ML
500 INJECTION INTRAMUSCULAR; INTRAVENOUS; SUBCUTANEOUS ONCE
Refills: 0 | Status: COMPLETED | OUTPATIENT
Start: 2023-10-06 | End: 2023-10-06

## 2023-10-06 RX ORDER — ATORVASTATIN CALCIUM 80 MG/1
40 TABLET, FILM COATED ORAL AT BEDTIME
Refills: 0 | Status: DISCONTINUED | OUTPATIENT
Start: 2023-10-06 | End: 2023-10-09

## 2023-10-06 RX ORDER — FAMOTIDINE 10 MG/ML
40 INJECTION INTRAVENOUS AT BEDTIME
Refills: 0 | Status: DISCONTINUED | OUTPATIENT
Start: 2023-10-06 | End: 2023-10-09

## 2023-10-06 RX ORDER — ACETAMINOPHEN 500 MG
650 TABLET ORAL EVERY 6 HOURS
Refills: 0 | Status: DISCONTINUED | OUTPATIENT
Start: 2023-10-06 | End: 2023-10-09

## 2023-10-06 RX ORDER — ROPINIROLE 8 MG/1
0 TABLET, FILM COATED, EXTENDED RELEASE ORAL
Qty: 0 | Refills: 0 | DISCHARGE

## 2023-10-06 RX ORDER — ONDANSETRON 8 MG/1
4 TABLET, FILM COATED ORAL ONCE
Refills: 0 | Status: COMPLETED | OUTPATIENT
Start: 2023-10-05 | End: 2023-10-05

## 2023-10-06 RX ORDER — PRAMIPEXOLE DIHYDROCHLORIDE 0.12 MG/1
1.5 TABLET ORAL ONCE
Refills: 0 | Status: COMPLETED | OUTPATIENT
Start: 2023-10-06 | End: 2023-10-06

## 2023-10-06 RX ORDER — POTASSIUM CHLORIDE 20 MEQ
40 PACKET (EA) ORAL ONCE
Refills: 0 | Status: COMPLETED | OUTPATIENT
Start: 2023-10-06 | End: 2023-10-06

## 2023-10-06 RX ORDER — DULOXETINE HYDROCHLORIDE 30 MG/1
40 CAPSULE, DELAYED RELEASE ORAL DAILY
Refills: 0 | Status: DISCONTINUED | OUTPATIENT
Start: 2023-10-06 | End: 2023-10-09

## 2023-10-06 RX ORDER — GABAPENTIN 400 MG/1
300 CAPSULE ORAL EVERY 8 HOURS
Refills: 0 | Status: DISCONTINUED | OUTPATIENT
Start: 2023-10-06 | End: 2023-10-06

## 2023-10-06 RX ORDER — METHADONE HYDROCHLORIDE 40 MG/1
1 TABLET ORAL
Qty: 0 | Refills: 0 | DISCHARGE

## 2023-10-06 RX ORDER — LOSARTAN POTASSIUM 100 MG/1
100 TABLET, FILM COATED ORAL DAILY
Refills: 0 | Status: DISCONTINUED | OUTPATIENT
Start: 2023-10-06 | End: 2023-10-09

## 2023-10-06 RX ORDER — MAGNESIUM SULFATE 500 MG/ML
2 VIAL (ML) INJECTION ONCE
Refills: 0 | Status: COMPLETED | OUTPATIENT
Start: 2023-10-06 | End: 2023-10-06

## 2023-10-06 RX ADMIN — Medication 1 MILLIGRAM(S): at 00:30

## 2023-10-06 RX ADMIN — Medication 25 GRAM(S): at 17:11

## 2023-10-06 RX ADMIN — ONDANSETRON 4 MILLIGRAM(S): 8 TABLET, FILM COATED ORAL at 15:22

## 2023-10-06 RX ADMIN — Medication 40 MILLIEQUIVALENT(S): at 17:11

## 2023-10-06 RX ADMIN — ONDANSETRON 4 MILLIGRAM(S): 8 TABLET, FILM COATED ORAL at 00:02

## 2023-10-06 RX ADMIN — ONDANSETRON 4 MILLIGRAM(S): 8 TABLET, FILM COATED ORAL at 21:50

## 2023-10-06 RX ADMIN — ONDANSETRON 4 MILLIGRAM(S): 8 TABLET, FILM COATED ORAL at 19:18

## 2023-10-06 RX ADMIN — SODIUM CHLORIDE 500 MILLILITER(S): 9 INJECTION INTRAMUSCULAR; INTRAVENOUS; SUBCUTANEOUS at 19:19

## 2023-10-06 RX ADMIN — PRAMIPEXOLE DIHYDROCHLORIDE 1.5 MILLIGRAM(S): 0.12 TABLET ORAL at 22:49

## 2023-10-06 NOTE — ED ADULT NURSE NOTE - NSFALLUNIVINTERV_ED_ALL_ED
Bed/Stretcher in lowest position, wheels locked, appropriate side rails in place/Call bell, personal items and telephone in reach/Instruct patient to call for assistance before getting out of bed/chair/stretcher/Non-slip footwear applied when patient is off stretcher/[place] to call system/Physically safe environment - no spills, clutter or unnecessary equipment/Purposeful proactive rounding/Room/bathroom lighting operational, light cord in reach

## 2023-10-06 NOTE — ED ADULT NURSE NOTE - OBJECTIVE STATEMENT
72 y/o F A&Ox3 PMH depression, GERD denies pertinent PSH presents to the ED from home c/o nausea. Pt reports intermittent dizziness and nausea starting yesterday. Pt states PCP called today and advised pt to be seen in the ED for "elevated amalyse". Upon ED arrival pt is well appearing. Breathing is even and unlabored. Skin is warm, dry & in tact. Denies CP, SOB, HA, numbness, tingling, vision changes. IV access obtained. Comfort & safety provided.

## 2023-10-06 NOTE — ED PROVIDER NOTE - OBJECTIVE STATEMENT
72 yo female with pmh HTN, depression, RLS on Methadone, allergic rhinitis, scoliosis s/p multiple spine surgeries, Here for evaluation of persistent nausea since yesterday.  Patient states she started having nausea and vomiting yesterday around 12 PM.  She went to [facility], had blood work done and was sent home.  Today she started developing more nausea, states she feels generally weak and "unwell".  She denies any fevers, chills, diarrhea, abdominal pain, headaches, changes in vision.  Did not take anything today for her symptoms.

## 2023-10-06 NOTE — ED ADULT TRIAGE NOTE - CHIEF COMPLAINT QUOTE
1-2 days of N/V. pt seen at [facility] yesterday. pt returning today as she is still feeling weak and nauseous.

## 2023-10-06 NOTE — ED ADULT NURSE NOTE - CHIEF COMPLAINT QUOTE
1-2 days of N/V. pt seen at [facility] yesterday. pt returning today as she is still feeling weak and nauseous.

## 2023-10-06 NOTE — H&P ADULT - NSHPPHYSICALEXAM_GEN_ALL_CORE
T(C): 36.9 (10-07-23 @ 00:30), Max: 37 (10-06-23 @ 12:14)  HR: 60 (10-07-23 @ 00:30) (60 - 80)  BP: 160/83 (10-07-23 @ 00:30) (114/79 - 160/83)  RR: 18 (10-07-23 @ 00:30) (16 - 18)  SpO2: 96% (10-07-23 @ 00:30) (95% - 98%)  GENERAL: NAD, lying in bed   EYES: EOMI, PERRLA; conjunctiva and sclera clear  ENMT: Moist oral mucosa, no pharyngeal injection or exudates   NECK: Supple, no palpable masses; no JVD  RESPIRATORY: Normal respiratory effort; lungs are clear to auscultation bilaterally  CARDIOVASCULAR: Regular rate and rhythm, normal S1 and S2, no murmur/rub/gallop; No lower extremity edema; Peripheral pulses are 2+ bilaterally  ABDOMEN: Nontender to palpation, normoactive bowel sounds, no rebound/guarding   MUSCULOSKELETAL:  no joint swelling or tenderness to palpation  PSYCH: A+O to person, place, and time; affect appropriate  NEUROLOGY: CN 2-12 are intact and symmetric; no gross motor or sensory deficits   SKIN: No rashes; no palpable lesions

## 2023-10-06 NOTE — ED PROVIDER NOTE - CLINICAL SUMMARY MEDICAL DECISION MAKING FREE TEXT BOX
SUBJECTIVE:                                                      Rigo Bradford is a 9 month old male, here for a routine health maintenance visit.    Patient was roomed by: Margy Zapata    [facility]     Social History  Patient accompanied by:  Father  Questions or concerns?: No    Forms to complete? No  Child lives with::  Mother, father and brother  Who takes care of your child?:  Home with family member, , pre-school, father and mother  Languages spoken in the home:  English  Recent family changes/ special stressors?:  None noted    Safety / Health Risk  Is your child around anyone who smokes?  No    TB Exposure:     No TB exposure    Car seat < 6 years old, in  back seat, rear-facing, 5-point restraint? Yes    Home Safety Survey:      Stairs Gated?:  Yes     Wood stove / Fireplace screened?  Not applicable     Poisons / cleaning supplies out of reach?:  Yes     Swimming pool?:  No     Firearms in the home?: No      Hearing / Vision  Hearing or vision concerns?  No concerns, hearing and vision subjectively normal    Daily Activities    Water source:  City water  Nutrition:  Formula, pureed foods and table foods  Formula:  [place] [employer]  Vitamins & Supplements:  No    Elimination       Urinary frequency:4-6 times per 24 hours     Stool frequency: 1-3 times per 24 hours     Stool consistency: hard     Elimination problems:  None    Sleep      Sleep arrangement:crib    Sleep position:  On back, on side and on stomach    Sleep pattern: wakes at night for feedings, regular bedtime routine and bedtime resistance        PROBLEM LIST  Patient Active Problem List   Diagnosis     Liveborn, born in hospital,  delivery     MEDICATIONS  Current Outpatient Prescriptions   Medication Sig Dispense Refill     ibuprofen (ADVIL/MOTRIN) 100 MG/5ML suspension Take 5 mLs (100 mg) by mouth every 6 hours as needed for fever or moderate pain 237 mL 6      ALLERGY  No Known  "Allergies    IMMUNIZATIONS  Immunization History   Administered Date(s) Administered     DTAP-IPV/HIB (PENTACEL) 2016, 02/22/2017, 05/15/2017     HepB-Peds 2016, 2016, 05/15/2017     Pneumococcal (PCV 13) 2016, 02/22/2017, 05/15/2017     Rotavirus, monovalent, 2-dose 2016, 02/22/2017       HEALTH HISTORY SINCE LAST VISIT  No surgery, major illness or injury since last physical exam    DEVELOPMENT  Screening tool used:   ASQ 9 M Communication Gross Motor Fine Motor Problem Solving Personal-social   Score 35 50 60 60 60   Cutoff 13.97 17.82 31.32 28.72 18.91   Result Passed Passed Passed Passed Passed         ROS  GENERAL: See health history, nutrition and daily activities   SKIN: No significant rash or lesions.  HEENT: Hearing/vision: see above.  No eye, nasal, ear symptoms.  RESP: No cough or other concens  CV:  No concerns  GI: See nutrition and elimination.  No concerns.  : See elimination. No concerns.  NEURO: See development    OBJECTIVE:                                                    EXAMPulse 108  Temp 97.1  F (36.2  C) (Axillary)  Resp (!) 96  Ht 2' 6\" (0.762 m)  Wt 23 lb 14.5 oz (10.8 kg)  HC 18.75\" (47.6 cm)  BMI 18.68 kg/m2  96 %ile based on WHO (Boys, 0-2 years) length-for-age data using vitals from 7/27/2017.  96 %ile based on WHO (Boys, 0-2 years) weight-for-age data using vitals from 7/27/2017.  98 %ile based on WHO (Boys, 0-2 years) head circumference-for-age data using vitals from 7/27/2017.  GENERAL: Active, alert, in no acute distress.  SKIN: Clear. No significant rash, abnormal pigmentation or lesions  HEAD: Normocephalic. Normal fontanels and sutures.  EYES: Conjunctivae and cornea normal. Red reflexes present bilaterally. Symmetric light reflex and no eye movement on cover/uncover test  EARS: Normal canals. Tympanic membranes are normal; gray and translucent.  NOSE: Normal without discharge.  MOUTH/THROAT: Clear. No oral lesions.  NECK: Supple, no " masses.  LYMPH NODES: No adenopathy  LUNGS: Clear. No rales, rhonchi, wheezing or retractions  HEART: Regular rhythm. Normal S1/S2. No murmurs. Normal femoral pulses.  ABDOMEN: Soft, non-tender, not distended, no masses or hepatosplenomegaly. Normal umbilicus and bowel sounds.   GENITALIA: Normal male external genitalia. Gianluca stage I,  Testes descended bilaterally, no hernia or hydrocele.    EXTREMITIES: Hips normal with full range of motion. Symmetric extremities, no deformities  NEUROLOGIC: Normal tone throughout. Normal reflexes for age    ASSESSMENT/PLAN:                                                    1. Encounter for routine child health examination w/o abnormal findings     - DEVELOPMENTAL TEST, MONGE    Anticipatory Guidance  The following topics were discussed:  SOCIAL / FAMILY:  NUTRITION:  HEALTH/ SAFETY:    Preventive Care Plan  Immunizations     Reviewed, up to date  Referrals/Ongoing Specialty care: No   [facility]  DENTAL VARNISH  Dental Varnish not indicated    FOLLOW-UP:    12 month Preventive Care visit    Raffy Tamayo MD  [facility]   Adult female w above hx pw c/o nausea and vomiting and dizziness. Seen at [facility] yesterday tx an released now returns w recurrent symptoms. Concerns for intraabdominal path including sbo/divdticula dz/colitis plan ct abd check labs IVF and reassess. Dizziness on ac concerns for ich check ct head.   Jignesh Chiang MD, [facility]

## 2023-10-06 NOTE — ED ADULT NURSE REASSESSMENT NOTE - NS ED NURSE REASSESS COMMENT FT1
Report received from ARUN Almodovar. Pt A&Ox4, in no acute distress at this time. Patient safety and comfort measures maintained.

## 2023-10-06 NOTE — H&P ADULT - ASSESSMENT
dehydration    hypoNa, hypoCl, hyperK, HAGMA, hyperCa, sophia w bun/cr > 20:1, lactic acidosis; all consistent with dehydration + hypovolemia  s/p 2 L NS + ca glu, regular insulin, d50 + lokelma in ER, with improvement of na, cl, k, sophia, hagma, lactic acidosis  Monitor I/Os, daily weights, UO, BUN/Cr, volume status, acid-base balance, electrolytes   obtain urine studies (UA, Cr, Lytes, Osm) + calculate FENa/FEUrea  trend na q4-6h until wnl; goal rate of correction ~8 meq/l/day  trend K q4-6h until wnl; ca glu + regular insulin + d50, k binders, bicarb as needed  trend lactate q4-6h until wnl  avoid nephrotoxic agents; appropriate dose adjustments for all renally cleared medications   encourage po intake; IVF resusci + electrolyte supplementation as needed in the mean time      generalized fatigue/weakness  h/o oa s/p l+r thr and r tkr, rls, chronic lbp 2/2 scoliosis s/p multiple spine surgeries c/b post pe  outpatient rheum, ortho, neuro, med documentation reviewed    htn    hld    oab    allergic rhinitis    gerd    obesity 72yo 80kg f w pmh htn, hld, gerd w hiatal hernia, oab, obesity, allergic rhinitis, oa s/p l+r thr and r tkr, chronic lbp 2/2 scoliosis s/p multiple spine surgeries c/b post pe, osteoporosis, rls, p/w generalized weakness/fatigue/malaise + nausea; in er, found to have lyte insufficiencies; admit to medicine for further mgmt

## 2023-10-06 NOTE — ED PROVIDER NOTE - PHYSICAL EXAMINATION
A&Ox3, NAD  Lungs CTAB. No w/r/r  Cardiac  RRR  Abd soft, NT/ND  Extremities: no edema.   Skin without rash.   No focal Deficits

## 2023-10-06 NOTE — ED PROVIDER NOTE - CROS ED ROS STATEMENT
all other ROS negative except as per HPI Implemented All Universal Safety Interventions:  [place] to call system. Call bell, personal items and telephone within reach. Instruct patient to call for assistance. Room bathroom lighting operational. Non-slip footwear when patient is off stretcher. Physically safe environment: no spills, clutter or unnecessary equipment. Stretcher in lowest position, wheels locked, appropriate side rails in place. Implemented All Fall Risk Interventions:  [place] to call system. Call bell, personal items and telephone within reach. Instruct patient to call for assistance. Room bathroom lighting operational. Non-slip footwear when patient is off stretcher. Physically safe environment: no spills, clutter or unnecessary equipment. Stretcher in lowest position, wheels locked, appropriate side rails in place. Provide visual cue, wrist band, yellow gown, etc. Monitor gait and stability. Monitor for mental status changes and reorient to person, place, and time. Review medications for side effects contributing to fall risk. Reinforce activity limits and safety measures with patient and family.

## 2023-10-06 NOTE — H&P ADULT - PROBLEM SELECTOR PLAN 2
h/o oa s/p l+r thr and r tkr, chronic lbp 2/2 scoliosis s/p multiple spine surgeries c/b post pe, osteoporosis, rls  outpatient rheum, ortho, neuro, endo, med documentation reviewed  imaging shows, "Extensive osseous fusion of the spine and findings suggestive of bilateral sacroiliitis, overall similar in comparison to the prior exam of 6/3/2021. Differential consideration is given to ankylosing spondylitis or other inflammatory arthropathy."  follow up tsh, folate, b12, rpr, ua, rvp; inflammatory markers, sukhi, rf + anticcp, hla-b27  maintain fall + frac precautions  tylenol + naproxen prn  cymbalta  pramipexole 3.75 er qhs => pramipexole 1 tid while inpatient   ?gabapentin (has held off on taking as it makes patient feel, "loopy")  ?methadone (has been trying to taper off on her own)  prolia + vitd supplementation upon discharge  nutrition consult in am  pt/ot eval + sw/cm for disposition  rheum consult in am

## 2023-10-06 NOTE — H&P ADULT - PROBLEM SELECTOR PLAN 5
outpatient uro-gyn documentation reviewed  imaging shows, "Mild urinary bladder wall thickening and mild right ureteral urothelial thickening, possibly reflecting cystitis and an ascending urinary tract infection. Question abnormal enhancement in the upper poles of both kidneys, versus artifact, with consideration given to acute pyelonephritis. Recommend correlation with urinalysis."   ua reveals a bland sediment  patient otherwise asymptomatic w regards to uti  montior for fever, changes in white count, luts; hold off abx for now  gemtesa => myrbetriq while inpatient (altho, has been ineffective in the past; patient to bring in own med)

## 2023-10-06 NOTE — H&P ADULT - NSHPREVIEWOFSYSTEMS_GEN_ALL_CORE
CONSTITUTIONAL: No fever. + generalized weakness/fatigue/malaise  ENMT:  No sinus or throat pain  RESPIRATORY: No cough, wheezing, chills or hemoptysis; No shortness of breath  CARDIOVASCULAR: No chest pain, palpitations, dizziness, or leg swelling  GASTROINTESTINAL: No abdominal or epigastric pain. + nausea, no vomiting, or hematemesis; No diarrhea or constipation. No melena or hematochezia.  GENITOURINARY: No dysuria or incontinence  NEUROLOGICAL: No headaches, memory loss, loss of strength, numbness, or tremors  SKIN: No rashes,  No hives or eczema  ENDOCRINE: No heat or cold intolerance; No hair loss  MUSCULOSKELETAL: No joint pain or swelling; No muscle, back, or extremity pain  PSYCHIATRIC: No depression, anxiety, mood swings, or difficulty sleeping  HEME/LYMPH: No easy bruising, or bleeding gums; no enlarged LN

## 2023-10-06 NOTE — ED CLERICAL - NS ED CLERK UNITS
APER - Transferred from [facility] on 4/4, w/hemoptysis and RLL lung abscess  - Cx w/strep parasanguinis  - c/w Ceftriaxone and Metronidazole, will transition to oral Ceftin and flagyl for 3 more weeks through May 3rd  -S/p tooth extractions on 4/7

## 2023-10-06 NOTE — ED PROVIDER NOTE - ATTENDING APP SHARED VISIT CONTRIBUTION OF CARE
Private Physician Fran Carr  73y f pmh HTN, depression, RLS on Methadone, allergic rhinitis, scoliosis s/p harrintton Kaveh c/b PE,  multiple spine surgeries, Covid GERD, Osteoporosis . Pt comes to ed c/o nausea and vomiting onset two days ago seen at [facility] and tx w reglan and improved. Now comes to ed c/o "feeling terrible" with dizziness w/o vertigo and nausea, Has moved bowels. PSH Hystgrectomy. PE WDWN Eldelry female looking uncomfortable  heent normocephalic atraumatic neck supple chest clear anterior & posterior cv no rubs, gallops or murmurs abd soft +bs no mass guarding neruo gcs 15 speech fluent   Jignesh Chiang MD, Facep

## 2023-10-06 NOTE — ED PROVIDER NOTE - NS ED ATTENDING STATEMENT MOD
This was a shared visit with the TAYE. I reviewed and verified the documentation and independently performed the documented:

## 2023-10-06 NOTE — H&P ADULT - PROBLEM SELECTOR PLAN 1
Detail Level: Detailed
hypoNa, hypoCl, hypoK; suspect 2/2 gi loss (decreased po intake)  Monitor I/Os, daily weights, UO, BUN/Cr, volume status, acid-base balance, electrolytes   follow up urine studies (UA, Cr, Lytes, Osm) + s.osm  trend na q4-6h until wnl; goal rate of correction ~8 meq/l/day  trend K q4-6h until wnl; replete to goal K >4  avoid nephrotoxic agents; appropriate dose adjustments for all renally cleared medications   encourage po intake; IVF resusci + electrolyte supplementation as needed in the mean time

## 2023-10-06 NOTE — H&P ADULT - HISTORY OF PRESENT ILLNESS
72yo 80kg f w pmh htn, hld, gerd w hiatal hernia, oab, obesity, allergic rhinitis, oa s/p l+r thr and r tkr, chronic lbp 2/2 scoliosis s/p multiple spine surgeries c/b post pe, osteoporosis, rls, p/w generalized weakness/fatigue/malaise + nausea; Patient states she started having nausea and vomiting a couple of days ago.  She went to [facility], and was subsequently sent home as a no acute findings on work up conducted there and she felt better by the end of her stay there.  Today, she started developing more nausea, w generalized weakness/fatigue/malaise. so she presents to [facility] er for further evaluation.    in er, found to have lyte insufficiencies; admit to medicine for further mgmt

## 2023-10-06 NOTE — ED ADULT NURSE REASSESSMENT NOTE - NS ED NURSE REASSESS COMMENT FT1
MD Solis contacted, MD saleh patient, RN asked MD if he wants to further exam patient due to patient complaining of nausea, MD stated" the patient wont feel better even with more meds and has to follow up with regular doctors", RN asked MD if he would like to run any scans, MD stated " no she has gastritis, her labs look normal".

## 2023-10-07 LAB
A1C WITH ESTIMATED AVERAGE GLUCOSE RESULT: 6 % — HIGH (ref 4–5.6)
ALBUMIN SERPL ELPH-MCNC: 3.9 G/DL — SIGNIFICANT CHANGE UP (ref 3.3–5)
ALBUMIN SERPL ELPH-MCNC: 3.9 G/DL — SIGNIFICANT CHANGE UP (ref 3.3–5)
ALP SERPL-CCNC: 101 U/L — SIGNIFICANT CHANGE UP (ref 40–120)
ALP SERPL-CCNC: 99 U/L — SIGNIFICANT CHANGE UP (ref 40–120)
ALT FLD-CCNC: 23 U/L — SIGNIFICANT CHANGE UP (ref 10–45)
ALT FLD-CCNC: 24 U/L — SIGNIFICANT CHANGE UP (ref 10–45)
ANION GAP SERPL CALC-SCNC: 12 MMOL/L — SIGNIFICANT CHANGE UP (ref 5–17)
ANION GAP SERPL CALC-SCNC: 13 MMOL/L — SIGNIFICANT CHANGE UP (ref 5–17)
ANION GAP SERPL CALC-SCNC: 14 MMOL/L — SIGNIFICANT CHANGE UP (ref 5–17)
APTT BLD: 32 SEC — SIGNIFICANT CHANGE UP (ref 24.5–35.6)
AST SERPL-CCNC: 50 U/L — HIGH (ref 10–40)
AST SERPL-CCNC: 53 U/L — HIGH (ref 10–40)
BASOPHILS # BLD AUTO: 0.02 K/UL — SIGNIFICANT CHANGE UP (ref 0–0.2)
BASOPHILS NFR BLD AUTO: 0.3 % — SIGNIFICANT CHANGE UP (ref 0–2)
BILIRUB SERPL-MCNC: 0.7 MG/DL — SIGNIFICANT CHANGE UP (ref 0.2–1.2)
BILIRUB SERPL-MCNC: 0.7 MG/DL — SIGNIFICANT CHANGE UP (ref 0.2–1.2)
BUN SERPL-MCNC: 10 MG/DL — SIGNIFICANT CHANGE UP (ref 7–23)
BUN SERPL-MCNC: 11 MG/DL — SIGNIFICANT CHANGE UP (ref 7–23)
BUN SERPL-MCNC: 11 MG/DL — SIGNIFICANT CHANGE UP (ref 7–23)
CALCIUM SERPL-MCNC: 8.3 MG/DL — LOW (ref 8.4–10.5)
CALCIUM SERPL-MCNC: 8.5 MG/DL — SIGNIFICANT CHANGE UP (ref 8.4–10.5)
CALCIUM SERPL-MCNC: 9.1 MG/DL — SIGNIFICANT CHANGE UP (ref 8.4–10.5)
CHLORIDE SERPL-SCNC: 91 MMOL/L — LOW (ref 96–108)
CHLORIDE SERPL-SCNC: 92 MMOL/L — LOW (ref 96–108)
CHLORIDE SERPL-SCNC: 93 MMOL/L — LOW (ref 96–108)
CHOLEST SERPL-MCNC: 128 MG/DL — SIGNIFICANT CHANGE UP
CO2 SERPL-SCNC: 23 MMOL/L — SIGNIFICANT CHANGE UP (ref 22–31)
CO2 SERPL-SCNC: 23 MMOL/L — SIGNIFICANT CHANGE UP (ref 22–31)
CO2 SERPL-SCNC: 24 MMOL/L — SIGNIFICANT CHANGE UP (ref 22–31)
CREAT SERPL-MCNC: 0.74 MG/DL — SIGNIFICANT CHANGE UP (ref 0.5–1.3)
CREAT SERPL-MCNC: 0.74 MG/DL — SIGNIFICANT CHANGE UP (ref 0.5–1.3)
CREAT SERPL-MCNC: 0.81 MG/DL — SIGNIFICANT CHANGE UP (ref 0.5–1.3)
EGFR: 76 ML/MIN/1.73M2 — SIGNIFICANT CHANGE UP
EGFR: 84 ML/MIN/1.73M2 — SIGNIFICANT CHANGE UP
EGFR: 84 ML/MIN/1.73M2 — SIGNIFICANT CHANGE UP
EOSINOPHIL # BLD AUTO: 0 K/UL — SIGNIFICANT CHANGE UP (ref 0–0.5)
EOSINOPHIL NFR BLD AUTO: 0 % — SIGNIFICANT CHANGE UP (ref 0–6)
ESTIMATED AVERAGE GLUCOSE: 126 MG/DL — HIGH (ref 68–114)
GLUCOSE SERPL-MCNC: 103 MG/DL — HIGH (ref 70–99)
GLUCOSE SERPL-MCNC: 105 MG/DL — HIGH (ref 70–99)
GLUCOSE SERPL-MCNC: 94 MG/DL — SIGNIFICANT CHANGE UP (ref 70–99)
HCT VFR BLD CALC: 34.3 % — LOW (ref 34.5–45)
HDLC SERPL-MCNC: 55 MG/DL — SIGNIFICANT CHANGE UP
HGB BLD-MCNC: 11.4 G/DL — LOW (ref 11.5–15.5)
IMM GRANULOCYTES NFR BLD AUTO: 0.6 % — SIGNIFICANT CHANGE UP (ref 0–0.9)
INR BLD: 1.02 RATIO — SIGNIFICANT CHANGE UP (ref 0.85–1.18)
LIPID PNL WITH DIRECT LDL SERPL: 53 MG/DL — SIGNIFICANT CHANGE UP
LYMPHOCYTES # BLD AUTO: 1.17 K/UL — SIGNIFICANT CHANGE UP (ref 1–3.3)
LYMPHOCYTES # BLD AUTO: 17 % — SIGNIFICANT CHANGE UP (ref 13–44)
MAGNESIUM SERPL-MCNC: 1.7 MG/DL — SIGNIFICANT CHANGE UP (ref 1.6–2.6)
MAGNESIUM SERPL-MCNC: 2.2 MG/DL — SIGNIFICANT CHANGE UP (ref 1.6–2.6)
MCHC RBC-ENTMCNC: 28.1 PG — SIGNIFICANT CHANGE UP (ref 27–34)
MCHC RBC-ENTMCNC: 33.2 GM/DL — SIGNIFICANT CHANGE UP (ref 32–36)
MCV RBC AUTO: 84.7 FL — SIGNIFICANT CHANGE UP (ref 80–100)
MONOCYTES # BLD AUTO: 0.38 K/UL — SIGNIFICANT CHANGE UP (ref 0–0.9)
MONOCYTES NFR BLD AUTO: 5.5 % — SIGNIFICANT CHANGE UP (ref 2–14)
NEUTROPHILS # BLD AUTO: 5.26 K/UL — SIGNIFICANT CHANGE UP (ref 1.8–7.4)
NEUTROPHILS NFR BLD AUTO: 76.6 % — SIGNIFICANT CHANGE UP (ref 43–77)
NON HDL CHOLESTEROL: 72 MG/DL — SIGNIFICANT CHANGE UP
NRBC # BLD: 0 /100 WBCS — SIGNIFICANT CHANGE UP (ref 0–0)
PHOSPHATE SERPL-MCNC: 1.7 MG/DL — LOW (ref 2.5–4.5)
PHOSPHATE SERPL-MCNC: 2.3 MG/DL — LOW (ref 2.5–4.5)
PLATELET # BLD AUTO: 258 K/UL — SIGNIFICANT CHANGE UP (ref 150–400)
POTASSIUM SERPL-MCNC: 2.9 MMOL/L — CRITICAL LOW (ref 3.5–5.3)
POTASSIUM SERPL-MCNC: 3.2 MMOL/L — LOW (ref 3.5–5.3)
POTASSIUM SERPL-MCNC: 4.1 MMOL/L — SIGNIFICANT CHANGE UP (ref 3.5–5.3)
POTASSIUM SERPL-SCNC: 2.9 MMOL/L — CRITICAL LOW (ref 3.5–5.3)
POTASSIUM SERPL-SCNC: 3.2 MMOL/L — LOW (ref 3.5–5.3)
POTASSIUM SERPL-SCNC: 4.1 MMOL/L — SIGNIFICANT CHANGE UP (ref 3.5–5.3)
PROT SERPL-MCNC: 6.3 G/DL — SIGNIFICANT CHANGE UP (ref 6–8.3)
PROT SERPL-MCNC: 6.4 G/DL — SIGNIFICANT CHANGE UP (ref 6–8.3)
PROTHROM AB SERPL-ACNC: 11.2 SEC — SIGNIFICANT CHANGE UP (ref 9.5–13)
RBC # BLD: 4.05 M/UL — SIGNIFICANT CHANGE UP (ref 3.8–5.2)
RBC # FLD: 12.8 % — SIGNIFICANT CHANGE UP (ref 10.3–14.5)
SODIUM SERPL-SCNC: 127 MMOL/L — LOW (ref 135–145)
SODIUM SERPL-SCNC: 128 MMOL/L — LOW (ref 135–145)
SODIUM SERPL-SCNC: 130 MMOL/L — LOW (ref 135–145)
TRIGL SERPL-MCNC: 107 MG/DL — SIGNIFICANT CHANGE UP
TSH SERPL-MCNC: 0.65 UIU/ML — SIGNIFICANT CHANGE UP (ref 0.27–4.2)
WBC # BLD: 6.87 K/UL — SIGNIFICANT CHANGE UP (ref 3.8–10.5)
WBC # FLD AUTO: 6.87 K/UL — SIGNIFICANT CHANGE UP (ref 3.8–10.5)

## 2023-10-07 PROCEDURE — 99232 SBSQ HOSP IP/OBS MODERATE 35: CPT

## 2023-10-07 RX ORDER — LIDOCAINE 4 G/100G
1 CREAM TOPICAL ONCE
Refills: 0 | Status: DISCONTINUED | OUTPATIENT
Start: 2023-10-07 | End: 2023-10-07

## 2023-10-07 RX ORDER — SENNA PLUS 8.6 MG/1
2 TABLET ORAL AT BEDTIME
Refills: 0 | Status: DISCONTINUED | OUTPATIENT
Start: 2023-10-07 | End: 2023-10-08

## 2023-10-07 RX ORDER — MAGNESIUM SULFATE 500 MG/ML
2 VIAL (ML) INJECTION ONCE
Refills: 0 | Status: COMPLETED | OUTPATIENT
Start: 2023-10-07 | End: 2023-10-07

## 2023-10-07 RX ORDER — SODIUM CHLORIDE 9 MG/ML
1000 INJECTION INTRAMUSCULAR; INTRAVENOUS; SUBCUTANEOUS
Refills: 0 | Status: DISCONTINUED | OUTPATIENT
Start: 2023-10-07 | End: 2023-10-09

## 2023-10-07 RX ORDER — CALCIUM CARBONATE 500(1250)
1 TABLET ORAL EVERY 8 HOURS
Refills: 0 | Status: DISCONTINUED | OUTPATIENT
Start: 2023-10-07 | End: 2023-10-09

## 2023-10-07 RX ORDER — LANOLIN ALCOHOL/MO/W.PET/CERES
3 CREAM (GRAM) TOPICAL AT BEDTIME
Refills: 0 | Status: DISCONTINUED | OUTPATIENT
Start: 2023-10-07 | End: 2023-10-08

## 2023-10-07 RX ORDER — POTASSIUM CHLORIDE 20 MEQ
40 PACKET (EA) ORAL ONCE
Refills: 0 | Status: COMPLETED | OUTPATIENT
Start: 2023-10-07 | End: 2023-10-07

## 2023-10-07 RX ORDER — PRAMIPEXOLE DIHYDROCHLORIDE 0.12 MG/1
1.5 TABLET ORAL ONCE
Refills: 0 | Status: COMPLETED | OUTPATIENT
Start: 2023-10-07 | End: 2023-10-07

## 2023-10-07 RX ADMIN — DULOXETINE HYDROCHLORIDE 40 MILLIGRAM(S): 30 CAPSULE, DELAYED RELEASE ORAL at 11:37

## 2023-10-07 RX ADMIN — Medication 250 MILLIGRAM(S): at 02:27

## 2023-10-07 RX ADMIN — ONDANSETRON 4 MILLIGRAM(S): 8 TABLET, FILM COATED ORAL at 13:08

## 2023-10-07 RX ADMIN — ONDANSETRON 4 MILLIGRAM(S): 8 TABLET, FILM COATED ORAL at 21:24

## 2023-10-07 RX ADMIN — ATORVASTATIN CALCIUM 40 MILLIGRAM(S): 80 TABLET, FILM COATED ORAL at 21:17

## 2023-10-07 RX ADMIN — Medication 25 GRAM(S): at 08:48

## 2023-10-07 RX ADMIN — Medication 3 MILLIGRAM(S): at 03:06

## 2023-10-07 RX ADMIN — LOSARTAN POTASSIUM 100 MILLIGRAM(S): 100 TABLET, FILM COATED ORAL at 05:41

## 2023-10-07 RX ADMIN — Medication 250 MILLIGRAM(S): at 03:30

## 2023-10-07 RX ADMIN — Medication 40 MILLIEQUIVALENT(S): at 10:13

## 2023-10-07 RX ADMIN — ENOXAPARIN SODIUM 40 MILLIGRAM(S): 100 INJECTION SUBCUTANEOUS at 00:54

## 2023-10-07 RX ADMIN — SODIUM CHLORIDE 100 MILLILITER(S): 9 INJECTION INTRAMUSCULAR; INTRAVENOUS; SUBCUTANEOUS at 13:08

## 2023-10-07 RX ADMIN — PRAMIPEXOLE DIHYDROCHLORIDE 1.5 MILLIGRAM(S): 0.12 TABLET ORAL at 02:27

## 2023-10-07 RX ADMIN — Medication 250 MILLIGRAM(S): at 23:02

## 2023-10-07 RX ADMIN — Medication 1 MILLIGRAM(S): at 23:50

## 2023-10-07 RX ADMIN — Medication 3 MILLIGRAM(S): at 23:50

## 2023-10-07 RX ADMIN — Medication 1 TABLET(S): at 10:12

## 2023-10-07 RX ADMIN — DEXTROSE MONOHYDRATE, SODIUM CHLORIDE, AND POTASSIUM CHLORIDE 100 MILLILITER(S): 50; .745; 4.5 INJECTION, SOLUTION INTRAVENOUS at 00:50

## 2023-10-07 RX ADMIN — FAMOTIDINE 40 MILLIGRAM(S): 10 INJECTION INTRAVENOUS at 21:17

## 2023-10-07 RX ADMIN — Medication 85 MILLIMOLE(S): at 17:27

## 2023-10-07 RX ADMIN — SENNA PLUS 2 TABLET(S): 8.6 TABLET ORAL at 23:50

## 2023-10-07 RX ADMIN — MONTELUKAST 10 MILLIGRAM(S): 4 TABLET, CHEWABLE ORAL at 11:37

## 2023-10-07 RX ADMIN — Medication 40 MILLIEQUIVALENT(S): at 06:26

## 2023-10-07 NOTE — OCCUPATIONAL THERAPY INITIAL EVALUATION ADULT - PERTINENT HX OF CURRENT PROBLEM, REHAB EVAL
72yo 80kg f w pmh htn, hld, gerd w hiatal hernia, oab, obesity, allergic rhinitis, oa s/p l+r thr and r tkr, chronic lbp 2/2 scoliosis s/p multiple spine surgeries c/b post pe, osteoporosis, rls, p/w generalized weakness/fatigue/malaise + nausea; Patient states she started having nausea and vomiting a couple of days ago.  She went to [facility], and was subsequently sent home as a no acute findings on work up conducted there and she felt better by the end of her stay there.  Today, she started developing more nausea, w generalized weakness/fatigue/malaise. so she presents to [facility] er for further evaluation. in er, found to have lyte insufficiencies; admit to medicine for further mgmt       CT ABDOMEN/PELVIS: *  The patient appears to be status post fundoplication, correlate with surgical history. However, there is a recurrent small hiatal hernia.*  Mild urinary bladder wall thickening and mild right ureteral urothelial thickening, possibly reflecting cystitis and an ascending urinary tract infection. Question abnormal enhancement in the upper poles   of both kidneys, versus artifact, with consideration given to acute pyelonephritis. Recommend correlation with urinalysis.* Unchanged mild dilation of the common bile duct and mild prominence of the main pancreatic duct since 2021.*  Extensive osseous fusion of the spine and findings suggestive of bilateral sacroiliitis, overall similar in comparison to the prior exam of 6/3/2021. Differential consideration is given to ankylosing spondylitis or other inflammatory arthropathy.  CT HEAD: No acute intracranial hemorrhage, mass effect, or shift of the midline structures.

## 2023-10-07 NOTE — PHYSICAL THERAPY INITIAL EVALUATION ADULT - PERTINENT HX OF CURRENT PROBLEM, REHAB EVAL
as per chart review: pmh htn, hld, gerd w hiatal hernia, oab, obesity, allergic rhinitis, oa s/p l+r thr and r tkr, chronic lbp 2/2 scoliosis s/p multiple spine surgeries c/b post pe, osteoporosis, rls, p/w generalized weakness/fatigue/malaise + nausea; in er, found to have lyte insufficiencies; admit to medicine for further mgmt

## 2023-10-07 NOTE — PATIENT PROFILE ADULT - ...
Attempted to contact pt regarding below, also received message from Dr. Suarez that pt CT Chest W Contrast 5/9/23 can be cancelled due to pt currently being inpatient and pt just having a CT done 5/3/23 per Dr. Suarez. Pt should still keep follow up as scheduled 5/16/23. Will await return call, once writer gets approval from pt can call centralized scheduling to notify that CT should be cancelled. Will await updating pt.    07-Oct-2023 01:08:20

## 2023-10-07 NOTE — PATIENT PROFILE ADULT - FALL HARM RISK - HARM RISK INTERVENTIONS
Assistance with ambulation/Assistance OOB with selected safe patient handling equipment/Communicate Risk of Fall with Harm to all staff/Discuss with provider need for PT consult/Monitor gait and stability/Provide patient with walking aids - walker, cane, crutches/Reinforce activity limits and safety measures with patient and family/Reorient to person, place and time as needed/Review medications for side effects contributing to fall risk/Sit up slowly, dangle for a short time, stand at bedside before walking/Tailored Fall Risk Interventions/Use of alarms - bed, chair and/or voice tab/Visual Cue: Yellow wristband and red socks/Bed in lowest position, wheels locked, appropriate side rails in place/Call bell, personal items and telephone in reach/Instruct patient to call for assistance before getting out of bed or chair/Non-slip footwear when patient is out of bed/[place] to call system/Physically safe environment - no spills, clutter or unnecessary equipment/Purposeful Proactive Rounding/Room/bathroom lighting operational, light cord in reach

## 2023-10-07 NOTE — PROGRESS NOTE ADULT - ASSESSMENT
73F w/ PMH of HTN, HLD, GERD w/ hiatal hernia, allergic rhinitis, OA, chronic LBP 2/2 scoliosis s/p multiple spine surgeries c/b post pe, RLS p/w generalized weakness/fatigue/malaise + nausea. Patient found to have electrolyte insufficiencies; admitred to medicine for further mgmt

## 2023-10-07 NOTE — OCCUPATIONAL THERAPY INITIAL EVALUATION ADULT - LIVES WITH, PROFILE
Pt reports living with son in a condo, no SUYAPA + elevator+ tub shower with shower chair. Reports being independent with all ADLs  and used a RW for mobility. Son only assist with cleaning./children

## 2023-10-08 LAB
ANION GAP SERPL CALC-SCNC: 13 MMOL/L — SIGNIFICANT CHANGE UP (ref 5–17)
ANION GAP SERPL CALC-SCNC: 15 MMOL/L — SIGNIFICANT CHANGE UP (ref 5–17)
BUN SERPL-MCNC: 10 MG/DL — SIGNIFICANT CHANGE UP (ref 7–23)
BUN SERPL-MCNC: 9 MG/DL — SIGNIFICANT CHANGE UP (ref 7–23)
CALCIUM SERPL-MCNC: 8.5 MG/DL — SIGNIFICANT CHANGE UP (ref 8.4–10.5)
CALCIUM SERPL-MCNC: 8.5 MG/DL — SIGNIFICANT CHANGE UP (ref 8.4–10.5)
CHLORIDE SERPL-SCNC: 91 MMOL/L — LOW (ref 96–108)
CHLORIDE SERPL-SCNC: 92 MMOL/L — LOW (ref 96–108)
CO2 SERPL-SCNC: 23 MMOL/L — SIGNIFICANT CHANGE UP (ref 22–31)
CO2 SERPL-SCNC: 24 MMOL/L — SIGNIFICANT CHANGE UP (ref 22–31)
CREAT SERPL-MCNC: 0.72 MG/DL — SIGNIFICANT CHANGE UP (ref 0.5–1.3)
CREAT SERPL-MCNC: 0.74 MG/DL — SIGNIFICANT CHANGE UP (ref 0.5–1.3)
CRP SERPL-MCNC: 6 MG/L — HIGH (ref 0–4)
CULTURE RESULTS: SIGNIFICANT CHANGE UP
EGFR: 84 ML/MIN/1.73M2 — SIGNIFICANT CHANGE UP
EGFR: 87 ML/MIN/1.73M2 — SIGNIFICANT CHANGE UP
ERYTHROCYTE [SEDIMENTATION RATE] IN BLOOD: 32 MM/HR — HIGH (ref 0–20)
FOLATE SERPL-MCNC: 18.8 NG/ML — SIGNIFICANT CHANGE UP
GLUCOSE SERPL-MCNC: 108 MG/DL — HIGH (ref 70–99)
GLUCOSE SERPL-MCNC: 109 MG/DL — HIGH (ref 70–99)
MAGNESIUM SERPL-MCNC: 1.9 MG/DL — SIGNIFICANT CHANGE UP (ref 1.6–2.6)
MAGNESIUM SERPL-MCNC: 2 MG/DL — SIGNIFICANT CHANGE UP (ref 1.6–2.6)
OSMOLALITY SERPL: 263 MOSMOL/KG — LOW (ref 280–301)
PHOSPHATE SERPL-MCNC: 1.9 MG/DL — LOW (ref 2.5–4.5)
PHOSPHATE SERPL-MCNC: 2.8 MG/DL — SIGNIFICANT CHANGE UP (ref 2.5–4.5)
POTASSIUM SERPL-MCNC: 3.1 MMOL/L — LOW (ref 3.5–5.3)
POTASSIUM SERPL-MCNC: 4 MMOL/L — SIGNIFICANT CHANGE UP (ref 3.5–5.3)
POTASSIUM SERPL-SCNC: 3.1 MMOL/L — LOW (ref 3.5–5.3)
POTASSIUM SERPL-SCNC: 4 MMOL/L — SIGNIFICANT CHANGE UP (ref 3.5–5.3)
SODIUM SERPL-SCNC: 129 MMOL/L — LOW (ref 135–145)
SODIUM SERPL-SCNC: 129 MMOL/L — LOW (ref 135–145)
SPECIMEN SOURCE: SIGNIFICANT CHANGE UP
VIT B12 SERPL-MCNC: 381 PG/ML — SIGNIFICANT CHANGE UP (ref 232–1245)

## 2023-10-08 PROCEDURE — 99232 SBSQ HOSP IP/OBS MODERATE 35: CPT

## 2023-10-08 RX ORDER — DICLOFENAC SODIUM 30 MG/G
4 GEL TOPICAL
Refills: 0 | Status: DISCONTINUED | OUTPATIENT
Start: 2023-10-08 | End: 2023-10-09

## 2023-10-08 RX ORDER — POLYETHYLENE GLYCOL 3350 17 G/17G
17 POWDER, FOR SOLUTION ORAL DAILY
Refills: 0 | Status: DISCONTINUED | OUTPATIENT
Start: 2023-10-08 | End: 2023-10-09

## 2023-10-08 RX ORDER — CHLORHEXIDINE GLUCONATE 213 G/1000ML
1 SOLUTION TOPICAL
Refills: 0 | Status: DISCONTINUED | OUTPATIENT
Start: 2023-10-08 | End: 2023-10-09

## 2023-10-08 RX ORDER — METOCLOPRAMIDE HCL 10 MG
10 TABLET ORAL ONCE
Refills: 0 | Status: COMPLETED | OUTPATIENT
Start: 2023-10-08 | End: 2023-10-08

## 2023-10-08 RX ORDER — LANOLIN ALCOHOL/MO/W.PET/CERES
6 CREAM (GRAM) TOPICAL AT BEDTIME
Refills: 0 | Status: DISCONTINUED | OUTPATIENT
Start: 2023-10-08 | End: 2023-10-09

## 2023-10-08 RX ORDER — SENNA PLUS 8.6 MG/1
2 TABLET ORAL AT BEDTIME
Refills: 0 | Status: DISCONTINUED | OUTPATIENT
Start: 2023-10-08 | End: 2023-10-09

## 2023-10-08 RX ORDER — POTASSIUM CHLORIDE 20 MEQ
40 PACKET (EA) ORAL EVERY 4 HOURS
Refills: 0 | Status: COMPLETED | OUTPATIENT
Start: 2023-10-08 | End: 2023-10-08

## 2023-10-08 RX ORDER — POTASSIUM PHOSPHATE, MONOBASIC POTASSIUM PHOSPHATE, DIBASIC 236; 224 MG/ML; MG/ML
30 INJECTION, SOLUTION INTRAVENOUS ONCE
Refills: 0 | Status: COMPLETED | OUTPATIENT
Start: 2023-10-08 | End: 2023-10-08

## 2023-10-08 RX ADMIN — Medication 1 TABLET(S): at 22:19

## 2023-10-08 RX ADMIN — MONTELUKAST 10 MILLIGRAM(S): 4 TABLET, CHEWABLE ORAL at 11:28

## 2023-10-08 RX ADMIN — DICLOFENAC SODIUM 4 GRAM(S): 30 GEL TOPICAL at 17:10

## 2023-10-08 RX ADMIN — Medication 650 MILLIGRAM(S): at 04:40

## 2023-10-08 RX ADMIN — DULOXETINE HYDROCHLORIDE 40 MILLIGRAM(S): 30 CAPSULE, DELAYED RELEASE ORAL at 11:27

## 2023-10-08 RX ADMIN — Medication 250 MILLIGRAM(S): at 00:00

## 2023-10-08 RX ADMIN — Medication 40 MILLIEQUIVALENT(S): at 13:19

## 2023-10-08 RX ADMIN — DICLOFENAC SODIUM 4 GRAM(S): 30 GEL TOPICAL at 05:18

## 2023-10-08 RX ADMIN — Medication 650 MILLIGRAM(S): at 05:40

## 2023-10-08 RX ADMIN — SENNA PLUS 2 TABLET(S): 8.6 TABLET ORAL at 21:12

## 2023-10-08 RX ADMIN — POTASSIUM PHOSPHATE, MONOBASIC POTASSIUM PHOSPHATE, DIBASIC 83.33 MILLIMOLE(S): 236; 224 INJECTION, SOLUTION INTRAVENOUS at 22:19

## 2023-10-08 RX ADMIN — FAMOTIDINE 40 MILLIGRAM(S): 10 INJECTION INTRAVENOUS at 21:12

## 2023-10-08 RX ADMIN — ONDANSETRON 4 MILLIGRAM(S): 8 TABLET, FILM COATED ORAL at 12:41

## 2023-10-08 RX ADMIN — Medication 10 MILLIGRAM(S): at 15:51

## 2023-10-08 RX ADMIN — Medication 40 MILLIEQUIVALENT(S): at 10:10

## 2023-10-08 RX ADMIN — ENOXAPARIN SODIUM 40 MILLIGRAM(S): 100 INJECTION SUBCUTANEOUS at 00:58

## 2023-10-08 RX ADMIN — Medication 1 TABLET(S): at 13:49

## 2023-10-08 RX ADMIN — LOSARTAN POTASSIUM 100 MILLIGRAM(S): 100 TABLET, FILM COATED ORAL at 04:39

## 2023-10-08 RX ADMIN — ATORVASTATIN CALCIUM 40 MILLIGRAM(S): 80 TABLET, FILM COATED ORAL at 21:12

## 2023-10-08 NOTE — PROGRESS NOTE ADULT - SUBJECTIVE AND OBJECTIVE BOX
PROGRESS NOTE:   Authored by Dr. Matthew Pappas MD, Available on MS Teams    Patient is a 73y old  Female who presents with a chief complaint of Hypokalemia    Chart reviewed, events noted. This is a "73F w/ PMH of HTN, HLD, GERD w/ hiatal hernia, allergic rhinitis, OA, chronic LBP 2/2 scoliosis s/p multiple spine surgeries c/b post pe, RLS p/w generalized weakness/fatigue/malaise + nausea. Patient found to have electrolyte insufficiencies; admitred to medicine for further mgmt"  (08 Oct 2023 10:53)      SUBJECTIVE / OVERNIGHT EVENTS: Patient feels tired and continues to have nausea. Also w/ hip and leg pain which is chronic. Has constipation    ADDITIONAL REVIEW OF SYSTEMS:    MEDICATIONS  (STANDING):  atorvastatin 40 milliGRAM(s) Oral at bedtime  chlorhexidine 2% Cloths 1 Application(s) Topical <User Schedule>  diclofenac sodium 1% Gel 4 Gram(s) Topical two times a day  DULoxetine 40 milliGRAM(s) Oral daily  enoxaparin Injectable 40 milliGRAM(s) SubCutaneous every 24 hours  famotidine    Tablet 40 milliGRAM(s) Oral at bedtime  fluticasone propionate 50 MICROgram(s)/spray Nasal Spray 1 Spray(s) Both Nostrils two times a day  losartan 100 milliGRAM(s) Oral daily  mirabegron ER 25 milliGRAM(s) Oral daily  montelukast 10 milliGRAM(s) Oral daily  Pramipexole ER 3.75mg Tablet 1 Tablet(s) 1 Tablet(s) Oral <User Schedule>  sodium chloride 0.9%. 1000 milliLiter(s) (100 mL/Hr) IV Continuous <Continuous>    MEDICATIONS  (PRN):  acetaminophen     Tablet .. 650 milliGRAM(s) Oral every 6 hours PRN Temp greater or equal to 38C (100.4F), Mild Pain (1 - 3)  calcium carbonate    500 mG (Tums) Chewable 1 Tablet(s) Chew every 8 hours PRN Indigestion  melatonin 3 milliGRAM(s) Oral at bedtime PRN Insomnia  naproxen 250 milliGRAM(s) Oral every 12 hours PRN for moderate pain  ondansetron Injectable 4 milliGRAM(s) IV Push every 8 hours PRN Nausea and/or Vomiting  senna 2 Tablet(s) Oral at bedtime PRN Constipation      CAPILLARY BLOOD GLUCOSE        I&O's Summary    07 Oct 2023 07:01  -  08 Oct 2023 07:00  --------------------------------------------------------  IN: 1500 mL / OUT: 0 mL / NET: 1500 mL        PHYSICAL EXAM:  Vital Signs Last 24 Hrs  T(C): 36.6 (08 Oct 2023 11:11), Max: 37 (07 Oct 2023 20:28)  T(F): 97.8 (08 Oct 2023 11:11), Max: 98.6 (07 Oct 2023 20:28)  HR: 66 (08 Oct 2023 11:11) (66 - 78)  BP: 155/80 (08 Oct 2023 12:10) (144/76 - 173/82)  BP(mean): --  RR: 18 (08 Oct 2023 11:11) (18 - 18)  SpO2: 95% (08 Oct 2023 11:11) (92% - 95%)    Parameters below as of 08 Oct 2023 11:11  Patient On (Oxygen Delivery Method): room air        CONSTITUTIONAL: NAD  RESPIRATORY: Normal respiratory effort; lungs are clear to auscultation bilaterally  CARDIOVASCULAR: Regular rate and rhythm, normal S1 and S2, no murmur/rub/gallop; No lower extremity edema  ABDOMEN: Nontender to palpation, normoactive bowel sounds, no rebound/guarding  MUSCLOSKELETAL: no clubbing or cyanosis of digits  PSYCH: A+O to person, place, and time; affect appropriate    LABS:                        11.4   6.87  )-----------( 258      ( 07 Oct 2023 07:22 )             34.3     10-08    129<L>  |  91<L>  |  9   ----------------------------<  108<H>  3.1<L>   |  23  |  0.74    Ca    8.5      08 Oct 2023 06:30  Phos  2.8     10-08  Mg     2.0     10-08    TPro  6.4  /  Alb  3.9  /  TBili  0.7  /  DBili  x   /  AST  50<H>  /  ALT  24  /  AlkPhos  101  10-07    PT/INR - ( 07 Oct 2023 07:24 )   PT: 11.2 sec;   INR: 1.02 ratio         PTT - ( 07 Oct 2023 07:24 )  PTT:32.0 sec      Urinalysis Basic - ( 08 Oct 2023 06:30 )    Color: x / Appearance: x / SG: x / pH: x  Gluc: 108 mg/dL / Ketone: x  / Bili: x / Urobili: x   Blood: x / Protein: x / Nitrite: x   Leuk Esterase: x / RBC: x / WBC x   Sq Epi: x / Non Sq Epi: x / Bacteria: x        Culture - Urine (collected 06 Oct 2023 19:53)  Source: Clean Catch Clean Catch (Midstream)  Final Report (08 Oct 2023 12:35):    <10,000 CFU/mL Normal Urogenital Hoa
PROGRESS NOTE:   Authored by Dr. Matthew Pappas MD, Available on MS Teams    Patient is a 73y old  Female who presents with a chief complaint of generalized weakness/fatigue/malaise + nausea (06 Oct 2023 22:43)      SUBJECTIVE / OVERNIGHT EVENTS: Patient continues to have nausea with decreased appetite. No chest pain or abdominal pain. Patient denies dysuria or     ADDITIONAL REVIEW OF SYSTEMS:    MEDICATIONS  (STANDING):  atorvastatin 40 milliGRAM(s) Oral at bedtime  DULoxetine 40 milliGRAM(s) Oral daily  enoxaparin Injectable 40 milliGRAM(s) SubCutaneous every 24 hours  famotidine    Tablet 40 milliGRAM(s) Oral at bedtime  fluticasone propionate 50 MICROgram(s)/spray Nasal Spray 1 Spray(s) Both Nostrils two times a day  losartan 100 milliGRAM(s) Oral daily  mirabegron ER 25 milliGRAM(s) Oral daily  montelukast 10 milliGRAM(s) Oral daily  sodium chloride 0.9%. 1000 milliLiter(s) (100 mL/Hr) IV Continuous <Continuous>    MEDICATIONS  (PRN):  acetaminophen     Tablet .. 650 milliGRAM(s) Oral every 6 hours PRN Temp greater or equal to 38C (100.4F), Mild Pain (1 - 3)  calcium carbonate    500 mG (Tums) Chewable 1 Tablet(s) Chew every 8 hours PRN Indigestion  melatonin 3 milliGRAM(s) Oral at bedtime PRN Insomnia  naproxen 250 milliGRAM(s) Oral every 12 hours PRN for moderate pain  ondansetron Injectable 4 milliGRAM(s) IV Push every 8 hours PRN Nausea and/or Vomiting      CAPILLARY BLOOD GLUCOSE        I&O's Summary    06 Oct 2023 07:01  -  07 Oct 2023 07:00  --------------------------------------------------------  IN: 840 mL / OUT: 0 mL / NET: 840 mL    07 Oct 2023 07:01  -  07 Oct 2023 12:58  --------------------------------------------------------  IN: 120 mL / OUT: 0 mL / NET: 120 mL        PHYSICAL EXAM:  Vital Signs Last 24 Hrs  T(C): 36.8 (07 Oct 2023 10:47), Max: 36.9 (07 Oct 2023 00:30)  T(F): 98.2 (07 Oct 2023 10:47), Max: 98.4 (07 Oct 2023 00:30)  HR: 72 (07 Oct 2023 10:47) (60 - 80)  BP: 175/81 (07 Oct 2023 10:47) (114/79 - 175/81)  BP(mean): --  RR: 18 (07 Oct 2023 10:47) (16 - 18)  SpO2: 98% (07 Oct 2023 10:47) (95% - 98%)    Parameters below as of 07 Oct 2023 10:47  Patient On (Oxygen Delivery Method): room air        CONSTITUTIONAL: NAD  RESPIRATORY: Normal respiratory effort; lungs are clear to auscultation bilaterally  CARDIOVASCULAR: Regular rate and rhythm, normal S1 and S2, no murmur/rub/gallop; No lower extremity edema  ABDOMEN: Nontender to palpation, normoactive bowel sounds, no rebound/guarding  MUSCLOSKELETAL: no clubbing or cyanosis of digits; no joint swelling or tenderness to palpation  PSYCH: A+O to person, place, and time; affect appropriate    LABS:                        11.4   6.87  )-----------( 258      ( 07 Oct 2023 07:22 )             34.3     10-07    130<L>  |  93<L>  |  11  ----------------------------<  94  2.9<LL>   |  23  |  0.74    Ca    8.3<L>      07 Oct 2023 07:16  Phos  2.3     10-07  Mg     1.7     10-07    TPro  6.4  /  Alb  3.9  /  TBili  0.7  /  DBili  x   /  AST  50<H>  /  ALT  24  /  AlkPhos  101  10-07    PT/INR - ( 07 Oct 2023 07:24 )   PT: 11.2 sec;   INR: 1.02 ratio         PTT - ( 07 Oct 2023 07:24 )  PTT:32.0 sec      Urinalysis Basic - ( 07 Oct 2023 07:16 )    Color: x / Appearance: x / SG: x / pH: x  Gluc: 94 mg/dL / Ketone: x  / Bili: x / Urobili: x   Blood: x / Protein: x / Nitrite: x   Leuk Esterase: x / RBC: x / WBC x   Sq Epi: x / Non Sq Epi: x / Bacteria: x

## 2023-10-08 NOTE — DIETITIAN INITIAL EVALUATION ADULT - ENERGY INTAKE
Poor (<50%) In-house pt reports appetite and PO intake remain suboptimal, consuming very small amount of liquids. Reports having soups and broths mostly, states even items like chicken are too heavy and make her gag. Pt reports history of paraesophageal hernia with repair, with similar episode of nausea, gagging ~ 5 years ago. Obtained food preferences, will honor as able. Encouraged intake of soft items as tolerated, will add pudding, extra soup and high protein jello to trays. Pt reports dislike of yogurt and nutrition supplements such as Ensure. Reviewed menu ordering procedures, pt with menu at beside. Patient with no nutrition-related questions at this time. Made aware RD remains available as needed.  Pt noted with hypokalemia, being repleted.

## 2023-10-08 NOTE — PROVIDER CONTACT NOTE (OTHER) - BACKGROUND
73yoF PMHx HTN, depression, RLS on Methadone, allergic rhinitis, scoliosis s/p multiple spine surgeries, p/w persistent nausea since yesterday.

## 2023-10-08 NOTE — DIETITIAN INITIAL EVALUATION ADULT - ADD RECOMMEND
1) Continue current diet as tolerated. Defer diet texture/consistency to team/ Speech Language Pathologist. 2) RD to add high Protein Jello 3x daily to trays. Pt declining additional nutritional supplements at this time. 3) Recommend addition of multivitamin once daily if no contraindications. 4) Encourage intake of protein-rich foods as tolerated. 5) Malnutrition alert placed in EMR.

## 2023-10-08 NOTE — DIETITIAN INITIAL EVALUATION ADULT - ORAL INTAKE PTA/DIET HISTORY
Pt reports overall good PO intake and appetite up until ~ 1-2 days PTA during which she developed nausea and general lack of appetite. NKFA. Pt denies chewing/swallowing difficulty, diarrhea, constipation.

## 2023-10-08 NOTE — PROGRESS NOTE ADULT - PROBLEM SELECTOR PLAN 3
losartan  hold home chlorthalidone in view of lyte insufficiencies
losartan  hold home chlorthalidone in view of electrolyte insufficiencies

## 2023-10-08 NOTE — DIETITIAN INITIAL EVALUATION ADULT - OTHER INFO
Weight: Pt reports weight gain in the last 6 months. States weight used to be ~ 155lbs now is ~ 175lbs. Reports recently starting Ozempic ~ 6 weeks as per recommendation by MD. States she missed her dose last week while away on vacation. Weight history per chart: 179lbs (3/10/23), 175lbs (10/7/23). Current dosing weight is 174.7lbs (10/6). Standing weight today 172.6lbs. (10/8)

## 2023-10-08 NOTE — DIETITIAN INITIAL EVALUATION ADULT - REASON FOR ADMISSION
Hypokalemia    Chart reviewed, events noted. This is a "73F w/ PMH of HTN, HLD, GERD w/ hiatal hernia, allergic rhinitis, OA, chronic LBP 2/2 scoliosis s/p multiple spine surgeries c/b post pe, RLS p/w generalized weakness/fatigue/malaise + nausea. Patient found to have electrolyte insufficiencies; admitred to medicine for further mgmt"

## 2023-10-08 NOTE — PROGRESS NOTE ADULT - ASSESSMENT
73F w/ PMH of HTN, HLD, GERD w/ hiatal hernia, allergic rhinitis, OA, chronic LBP 2/2 scoliosis s/p multiple spine surgeries c/b post pe, RLS p/w generalized weakness/fatigue/malaise + nausea. Patient found to have electrolyte insufficiencies; admitted to medicine for further mgmt

## 2023-10-08 NOTE — DIETITIAN INITIAL EVALUATION ADULT - PROBLEM SELECTOR PLAN 8
"Chief Complaint   Patient presents with     Hypertension     BP (!) 146/72   Pulse 73   Temp 98.9  F (37.2  C) (Temporal)   Ht 1.549 m (5' 1\")   Wt 58 kg (127 lb 12.8 oz)   SpO2 100%   BMI 24.15 kg/m   Estimated body mass index is 24.15 kg/m  as calculated from the following:    Height as of this encounter: 1.549 m (5' 1\").    Weight as of this encounter: 58 kg (127 lb 12.8 oz).        Health Maintenance due pending provider review:  NONE    n/a    Chikis Fry CMA  "
ozempic

## 2023-10-08 NOTE — PROGRESS NOTE ADULT - NUTRITIONAL ASSESSMENT
This patient has been assessed with a concern for Malnutrition and has been determined to have a diagnosis/diagnoses of Mild protein-calorie malnutrition.    This patient is being managed with:   Diet Regular-  Entered: Oct  6 2023 10:13PM

## 2023-10-08 NOTE — DIETITIAN INITIAL EVALUATION ADULT - PERTINENT LABORATORY DATA
10-08    129<L>  |  91<L>  |  9   ----------------------------<  108<H>  3.1<L>   |  23  |  0.74    Ca    8.5      08 Oct 2023 06:30  Phos  2.8     10-08  Mg     2.0     10-08    TPro  6.4  /  Alb  3.9  /  TBili  0.7  /  DBili  x   /  AST  50<H>  /  ALT  24  /  AlkPhos  101  10-07  A1C with Estimated Average Glucose Result: 6.0 % (10-07-23 @ 07:19)

## 2023-10-08 NOTE — DIETITIAN INITIAL EVALUATION ADULT - PROBLEM SELECTOR PLAN 1
hypoNa, hypoCl, hypoK; suspect 2/2 gi loss (decreased po intake)  Monitor I/Os, daily weights, UO, BUN/Cr, volume status, acid-base balance, electrolytes   follow up urine studies (UA, Cr, Lytes, Osm) + s.osm  trend na q4-6h until wnl; goal rate of correction ~8 meq/l/day  trend K q4-6h until wnl; replete to goal K >4  avoid nephrotoxic agents; appropriate dose adjustments for all renally cleared medications   encourage po intake; IVF resusci + electrolyte supplementation as needed in the mean time

## 2023-10-08 NOTE — PROGRESS NOTE ADULT - PROBLEM SELECTOR PLAN 5
outpatient uro-gyn documentation reviewed  imaging shows, "Mild urinary bladder wall thickening and mild right ureteral urothelial thickening, possibly reflecting cystitis and an ascending urinary tract infection. Question abnormal enhancement in the upper poles of both kidneys, versus artifact, with consideration given to acute pyelonephritis. Recommend correlation with urinalysis."  UA reveals a bland sediment, patient otherwise asymptomatic w regards to uti  montior for fever, changes in white count; hold off abx for now  gemtesa => myrbetriq while inpatient (altho, has been ineffective in the past; patient to bring in own med)
outpatient uro-gyn documentation reviewed  imaging shows, "Mild urinary bladder wall thickening and mild right ureteral urothelial thickening, possibly reflecting cystitis and an ascending urinary tract infection. Question abnormal enhancement in the upper poles of both kidneys, versus artifact, with consideration given to acute pyelonephritis. Recommend correlation with urinalysis."  UA reveals a bland sediment, patient otherwise asymptomatic w regards to uti  montior for fever, changes in white count; hold off abx for now  UCx w/ normal urogenital pop  gemtesa => myrbetriq while inpatient (altho, has been ineffective in the past; patient to bring in own med)

## 2023-10-08 NOTE — DIETITIAN INITIAL EVALUATION ADULT - PERTINENT MEDS FT
MEDICATIONS  (STANDING):  atorvastatin 40 milliGRAM(s) Oral at bedtime  diclofenac sodium 1% Gel 4 Gram(s) Topical two times a day  DULoxetine 40 milliGRAM(s) Oral daily  enoxaparin Injectable 40 milliGRAM(s) SubCutaneous every 24 hours  famotidine    Tablet 40 milliGRAM(s) Oral at bedtime  fluticasone propionate 50 MICROgram(s)/spray Nasal Spray 1 Spray(s) Both Nostrils two times a day  losartan 100 milliGRAM(s) Oral daily  mirabegron ER 25 milliGRAM(s) Oral daily  montelukast 10 milliGRAM(s) Oral daily  potassium chloride    Tablet ER 40 milliEquivalent(s) Oral every 4 hours  Pramipexole ER 3.75mg Tablet 1 Tablet(s) 1 Tablet(s) Oral <User Schedule>  sodium chloride 0.9%. 1000 milliLiter(s) (100 mL/Hr) IV Continuous <Continuous>    MEDICATIONS  (PRN):  acetaminophen     Tablet .. 650 milliGRAM(s) Oral every 6 hours PRN Temp greater or equal to 38C (100.4F), Mild Pain (1 - 3)  calcium carbonate    500 mG (Tums) Chewable 1 Tablet(s) Chew every 8 hours PRN Indigestion  melatonin 3 milliGRAM(s) Oral at bedtime PRN Insomnia  naproxen 250 milliGRAM(s) Oral every 12 hours PRN for moderate pain  ondansetron Injectable 4 milliGRAM(s) IV Push every 8 hours PRN Nausea and/or Vomiting  senna 2 Tablet(s) Oral at bedtime PRN Constipation

## 2023-10-08 NOTE — PROGRESS NOTE ADULT - PROBLEM SELECTOR PLAN 1
hypoNa, hypoCl, hypoK; suspect 2/2 gi loss (decreased po intake)  Monitor I/Os, daily weights, UO, BUN/Cr, volume status, acid-base balance, electrolytes   follow up urine studies (UA, Cr, Lytes, Osm) + s osm  trend BMP q8, replete electrolytes as needed  avoid nephrotoxic agents; appropriate dose adjustments for all renally cleared medications   encourage po intake; IVF resuscitation
hypoNa, hypoCl, hypoK; suspect 2/2 decreased po intake. No diarrhea   Monitor I/Os, daily weights, UO, BUN/Cr, volume status, acid-base balance, electrolytes   follow up urine studies (UA, Cr, Lytes, Osm) + s osm  trend BMP q12, replete electrolytes as needed  avoid nephrotoxic agents; appropriate dose adjustments for all renally cleared medications   encourage po intake; s/p IVF resuscitation

## 2023-10-08 NOTE — PROGRESS NOTE ADULT - PROBLEM SELECTOR PLAN 2
h/o oa s/p l+r thr and r tkr, chronic lbp 2/2 scoliosis s/p multiple spine surgeries c/b post pe, osteoporosis, rls  outpatient rheum, ortho, neuro, endo, med documentation reviewed  imaging shows, "Extensive osseous fusion of the spine and findings suggestive of bilateral sacroiliitis, overall similar in comparison to the prior exam of 6/3/2021. Differential consideration is given to ankylosing spondylitis or other inflammatory arthropathy."  TSH wnl, f/u folate, b12, rpr, ua, rvp  maintain fall + frac precautions  tylenol + naproxen prn  cymbalta  pramipexole 3.75 er qhs => pramipexole 1 tid while inpatient   ?gabapentin (has held off on taking as it makes patient feel, "loopy")  ?methadone (has been trying to taper off on her own)  prolia + vitd supplementation upon discharge  nutrition consult in am  pt/ot eval + sw/cm for disposition
h/o oa s/p l+r thr and r tkr, chronic lbp 2/2 scoliosis s/p multiple spine surgeries c/b post pe, osteoporosis, rls  outpatient rheum, ortho, neuro, endo, med documentation reviewed  imaging shows, "Extensive osseous fusion of the spine and findings suggestive of bilateral sacroiliitis, overall similar in comparison to the prior exam of 6/3/2021. Differential consideration is given to ankylosing spondylitis or other inflammatory arthropathy."  TSH wnl  maintain fall + frac precautions  tylenol + naproxen prn  cymbalta  pramipexole 3.75 er qhs => pramipexole 1 tid while inpatient   ?gabapentin (has held off on taking as it makes patient feel, "loopy")  ?methadone (has been trying to taper off on her own)  prolia + vitd supplementation upon discharge  nutrition consult  pt/ot eval + sw/cm for disposition

## 2023-10-09 ENCOUNTER — TRANSCRIPTION ENCOUNTER (OUTPATIENT)
Age: 73
End: 2023-10-09

## 2023-10-09 VITALS — HEART RATE: 108 BPM | TEMPERATURE: 98 F

## 2023-10-09 DIAGNOSIS — Z00.00 ENCOUNTER FOR GENERAL ADULT MEDICAL EXAMINATION WITHOUT ABNORMAL FINDINGS: ICD-10-CM

## 2023-10-09 DIAGNOSIS — Z23 ENCOUNTER FOR IMMUNIZATION: ICD-10-CM

## 2023-10-09 LAB
ANION GAP SERPL CALC-SCNC: 14 MMOL/L — SIGNIFICANT CHANGE UP (ref 5–17)
BUN SERPL-MCNC: 13 MG/DL — SIGNIFICANT CHANGE UP (ref 7–23)
CALCIUM SERPL-MCNC: 9 MG/DL — SIGNIFICANT CHANGE UP (ref 8.4–10.5)
CHLORIDE SERPL-SCNC: 100 MMOL/L — SIGNIFICANT CHANGE UP (ref 96–108)
CO2 SERPL-SCNC: 21 MMOL/L — LOW (ref 22–31)
CREAT ?TM UR-MCNC: 41 MG/DL — SIGNIFICANT CHANGE UP
CREAT SERPL-MCNC: 1.07 MG/DL — SIGNIFICANT CHANGE UP (ref 0.5–1.3)
EGFR: 54 ML/MIN/1.73M2 — LOW
GLUCOSE SERPL-MCNC: 91 MG/DL — SIGNIFICANT CHANGE UP (ref 70–99)
MRSA PCR RESULT.: SIGNIFICANT CHANGE UP
OSMOLALITY UR: 199 MOS/KG — LOW (ref 300–900)
POTASSIUM SERPL-MCNC: 4.3 MMOL/L — SIGNIFICANT CHANGE UP (ref 3.5–5.3)
POTASSIUM SERPL-SCNC: 4.3 MMOL/L — SIGNIFICANT CHANGE UP (ref 3.5–5.3)
PROT ?TM UR-MCNC: <7 MG/DL — SIGNIFICANT CHANGE UP (ref 0–12)
PROT/CREAT UR-RTO: <0.2 RATIO — SIGNIFICANT CHANGE UP (ref 0–0.2)
RHEUMATOID FACT SERPL-ACNC: 12 IU/ML — SIGNIFICANT CHANGE UP (ref 0–13)
S AUREUS DNA NOSE QL NAA+PROBE: DETECTED
SODIUM SERPL-SCNC: 135 MMOL/L — SIGNIFICANT CHANGE UP (ref 135–145)
SODIUM UR-SCNC: 38 MMOL/L — SIGNIFICANT CHANGE UP
T PALLIDUM AB TITR SER: NEGATIVE — SIGNIFICANT CHANGE UP
UUN UR-MCNC: 192 MG/DL — SIGNIFICANT CHANGE UP

## 2023-10-09 PROCEDURE — 99239 HOSP IP/OBS DSCHRG MGMT >30: CPT

## 2023-10-09 PROCEDURE — G0452: CPT | Mod: 26

## 2023-10-09 RX ORDER — POLYETHYLENE GLYCOL 3350 17 G/17G
17 POWDER, FOR SOLUTION ORAL
Qty: 510 | Refills: 0
Start: 2023-10-09 | End: 2023-11-07

## 2023-10-09 RX ORDER — ACETAMINOPHEN 500 MG
0 TABLET ORAL
Refills: 0 | DISCHARGE

## 2023-10-09 RX ORDER — SENNA PLUS 8.6 MG/1
2 TABLET ORAL
Qty: 60 | Refills: 0
Start: 2023-10-09 | End: 2023-11-07

## 2023-10-09 RX ORDER — CHLORTHALIDONE 50 MG
1 TABLET ORAL
Qty: 0 | Refills: 0 | DISCHARGE

## 2023-10-09 RX ORDER — GABAPENTIN 400 MG/1
1 CAPSULE ORAL
Refills: 0 | DISCHARGE

## 2023-10-09 RX ORDER — METHADONE HYDROCHLORIDE 40 MG/1
1 TABLET ORAL
Refills: 0 | DISCHARGE

## 2023-10-09 RX ORDER — DICLOFENAC SODIUM 30 MG/G
1 GEL TOPICAL
Qty: 0 | Refills: 0 | DISCHARGE
Start: 2023-10-09

## 2023-10-09 RX ORDER — ACETAMINOPHEN 500 MG
2 TABLET ORAL
Qty: 0 | Refills: 0 | DISCHARGE
Start: 2023-10-09

## 2023-10-09 RX ORDER — GLUCOSAMINE/CHONDROITIN/C/MANG 500-400 MG
3 CAPSULE ORAL
Qty: 0 | Refills: 0 | DISCHARGE

## 2023-10-09 RX ADMIN — ENOXAPARIN SODIUM 40 MILLIGRAM(S): 100 INJECTION SUBCUTANEOUS at 01:06

## 2023-10-09 RX ADMIN — DICLOFENAC SODIUM 4 GRAM(S): 30 GEL TOPICAL at 05:36

## 2023-10-09 RX ADMIN — MONTELUKAST 10 MILLIGRAM(S): 4 TABLET, CHEWABLE ORAL at 12:22

## 2023-10-09 RX ADMIN — DULOXETINE HYDROCHLORIDE 40 MILLIGRAM(S): 30 CAPSULE, DELAYED RELEASE ORAL at 12:22

## 2023-10-09 RX ADMIN — CHLORHEXIDINE GLUCONATE 1 APPLICATION(S): 213 SOLUTION TOPICAL at 05:37

## 2023-10-09 RX ADMIN — MIRABEGRON 25 MILLIGRAM(S): 50 TABLET, EXTENDED RELEASE ORAL at 12:22

## 2023-10-09 RX ADMIN — LOSARTAN POTASSIUM 100 MILLIGRAM(S): 100 TABLET, FILM COATED ORAL at 05:36

## 2023-10-09 RX ADMIN — Medication 1 MILLIGRAM(S): at 02:44

## 2023-10-09 RX ADMIN — Medication 1 SPRAY(S): at 05:36

## 2023-10-09 NOTE — DISCHARGE NOTE PROVIDER - HOSPITAL COURSE
HPI:  74yo 80kg f w pmh htn, hld, gerd w hiatal hernia, oab, obesity, allergic rhinitis, oa s/p l+r thr and r tkr, chronic lbp 2/2 scoliosis s/p multiple spine surgeries c/b post pe, osteoporosis, rls, p/w generalized weakness/fatigue/malaise + nausea; Patient states she started having nausea and vomiting a couple of days ago.  She went to [facility], and was subsequently sent home as a no acute findings on work up conducted there and she felt better by the end of her stay there.  Today, she started developing more nausea, w generalized weakness/fatigue/malaise. so she presents to [facility] er for further evaluation.    in er, found to have lyte insufficiencies; admit to medicine for further mgmt  (06 Oct 2023 22:43)    Hospital Course:      Important Medication Changes and Reason:    Active or Pending Issues Requiring Follow-up:    Advanced Directives:   [ ] Full code  [ ] DNR  [ ] Hospice    Discharge Diagnoses:         HPI:  72yo 80kg f w pmh htn, hld, gerd w hiatal hernia, oab, obesity, allergic rhinitis, oa s/p l+r thr and r tkr, chronic lbp 2/2 scoliosis s/p multiple spine surgeries c/b post pe, osteoporosis, rls, p/w generalized weakness/fatigue/malaise + nausea; Patient states she started having nausea and vomiting a couple of days ago.  She went to [facility], and was subsequently sent home as a no acute findings on work up conducted there and she felt better by the end of her stay there.  Today, she started developing more nausea, w generalized weakness/fatigue/malaise. so she presents to [facility] er for further evaluation.    in er, found to have lyte insufficiencies; admit to medicine for further mgmt  (06 Oct 2023 22:43)    Hospital Course:      Important Medication Changes and Reason:    Active or Pending Issues Requiring Follow-up:    Advanced Directives:   [ x] Full code  [ ] DNR  [ ] Hospice    Discharge Diagnoses:         HPI:  72yo 80kg f w pmh htn, hld, gerd w hiatal hernia, oab, obesity, allergic rhinitis, oa s/p l+r thr and r tkr, chronic lbp 2/2 scoliosis s/p multiple spine surgeries c/b post pe, osteoporosis, rls, p/w generalized weakness/fatigue/malaise + nausea; Patient states she started having nausea and vomiting a couple of days ago.  She went to [facility], and was subsequently sent home as a no acute findings on work up conducted there and she felt better by the end of her stay there.  Today, she started developing more nausea, w generalized weakness/fatigue/malaise. so she presents to [facility] er for further evaluation.    in er, found to have lyte insufficiencies; admit to medicine for further mgmt  (06 Oct 2023 22:43)    Hospital Course:    Pt given zofran for vomiting . Symptoms resided. Electrolyes improved. CT abdominal and CT head without significant findings. Discharged home to follow up with PMD       Important Medication Changes and Reason:    Active or Pending Issues Requiring Follow-up:  PMD     Advanced Directives:   [ x] Full code  [ ] DNR  [ ] Hospice    Discharge Diagnoses:       Dehydration        Hypokalemia       Hyponatremia       Chronic pain     Discharge home          HPI:  72yo 80kg f w pmh htn, hld, gerd w hiatal hernia, oab, obesity, allergic rhinitis, oa s/p l+r thr and r tkr, chronic lbp 2/2 scoliosis s/p multiple spine surgeries c/b post pe, osteoporosis, rls, p/w generalized weakness/fatigue/malaise + nausea; Patient states she started having nausea and vomiting a couple of days ago.  She went to [facility], and was subsequently sent home as a no acute findings on work up conducted there and she felt better by the end of her stay there.  Today, she started developing more nausea, w generalized weakness/fatigue/malaise. so she presents to [facility] er for further evaluation.    in er, found to have lyte insufficiencies; admit to medicine for further mgmt  (06 Oct 2023 22:43)    Hospital Course:    Pt given zofran for vomiting . Symptoms resided. Electrolytes improved. CT abdominal and CT head without significant findings. Discharged home to follow up with PMD       Important Medication Changes and Reason:    Active or Pending Issues Requiring Follow-up:  PMD     Advanced Directives:   [ x] Full code  [ ] DNR  [ ] Hospice    Discharge Diagnoses:       Dehydration        Hypokalemia       Hyponatremia       Chronic pain     Discharge home

## 2023-10-09 NOTE — DISCHARGE NOTE PROVIDER - CARE PROVIDER_API CALL
Fran Carr  Internal Medicine  [address] [facility] [facility] [address]  [place] [address]  Phone: [phone]  Fax: [phone]  Follow Up Time:    Fran Carr  Internal Medicine  [address], [address]  [place] [address]  Phone: [phone]  Fax: [phone]  Follow Up Time:     Janie Damian  Rheumatology  [address] [facility] [address] [facility] [facility] [address]  [place] [address]  Phone: [phone]  Fax: [phone]  Follow Up Time:

## 2023-10-09 NOTE — DISCHARGE NOTE PROVIDER - NSDCCPTREATMENT_GEN_ALL_CORE_FT
PRINCIPAL PROCEDURE  Procedure: CT abdomen  Findings and Treatment: IMPRESSION:  *  The patient appears to be status post fundoplication, correlate with   surgical history. However, there is a recurrent small hiatal hernia.  *  Mild urinary bladder wall thickening and mild right ureteral   urothelial thickening, possibly reflecting cystitis and an ascending   urinary tract infection. Question abnormal enhancement in the upper poles   of both kidneys, versus artifact, with consideration given to acute   pyelonephritis. Recommend correlation with urinalysis.  *  Unchanged mild dilation of the common bile duct and mild prominence of   the main pancreatic duct since 2021.  *  Extensive osseous fusion of the spine and findings suggestive of   bilateral sacroiliitis, overall similar in comparison to the prior exam   of 6/3/2021. Differential consideration is given to ankylosing   spondylitis or other inflammatory arthropathy.

## 2023-10-09 NOTE — DISCHARGE NOTE PROVIDER - NSCORESITESY/N_GEN_A_CORE_RD
Patient is concerned regarding interpretation of EKG from 10/28/21  I emphasized that although that EKG was interpreted different, EKG obtained thereafter are relatively unchanged from previous  Reviewed results of Echocardiogram   Stress Test is scheduled 11/08/21  Advised patient to obtain stress test as scheduled, otherwise our office does not recommend any further changes at this time  Patient is agreeable to plan      No

## 2023-10-09 NOTE — DISCHARGE NOTE PROVIDER - NSDCCPCAREPLAN_GEN_ALL_CORE_FT
PRINCIPAL DISCHARGE DIAGNOSIS  Diagnosis: Hypokalemia  Assessment and Plan of Treatment: Improved  Follow up with PMD for managment of electrolytes      SECONDARY DISCHARGE DIAGNOSES  Diagnosis: Hyponatremia  Assessment and Plan of Treatment: Improved  Follow up with PMD for managment of electrolytes    Diagnosis: Dehydration  Assessment and Plan of Treatment: Continue supportive care, hydration  Follow up with PMD for managment and repeat lab work    Diagnosis: Chronic generalized pain disorder  Assessment and Plan of Treatment: Continue pain regimen    Diagnosis: HTN (hypertension)  Assessment and Plan of Treatment: Follow up with your medical doctor to establish long term blood pressure treatment goals.      Diagnosis: Urinary retention  Assessment and Plan of Treatment: Follow up with Urology as scheduled     PRINCIPAL DISCHARGE DIAGNOSIS  Diagnosis: Hypokalemia  Assessment and Plan of Treatment: Improved  Follow up with PMD for managment of electrolytes      SECONDARY DISCHARGE DIAGNOSES  Diagnosis: Hyponatremia  Assessment and Plan of Treatment: Improved  Follow up with PMD for managment of electrolytes    Diagnosis: Dehydration  Assessment and Plan of Treatment: Continue supportive care, hydration  Follow up with PMD for managment and repeat lab work    Diagnosis: Chronic generalized pain disorder  Assessment and Plan of Treatment: Continue pain regimen    Diagnosis: HTN (hypertension)  Assessment and Plan of Treatment: Follow up with your medical doctor to establish long term blood pressure treatment goals.      Diagnosis: Urinary retention  Assessment and Plan of Treatment: Follow up with Urology as scheduled    Diagnosis: Liver cyst  Assessment and Plan of Treatment: Follow up with PMD for managment and referral to hepatology/GI     PRINCIPAL DISCHARGE DIAGNOSIS  Diagnosis: Hypokalemia  Assessment and Plan of Treatment: Improved  Follow up with PMD for managment of electrolytes      SECONDARY DISCHARGE DIAGNOSES  Diagnosis: Hyponatremia  Assessment and Plan of Treatment: Improved  Follow up with PMD for managment of electrolytes    Diagnosis: Dehydration  Assessment and Plan of Treatment: Continue supportive care, hydration  Follow up with PMD for managment and repeat lab work    Diagnosis: Chronic generalized pain disorder  Assessment and Plan of Treatment: Continue pain regimen    Diagnosis: HTN (hypertension)  Assessment and Plan of Treatment: Follow up with your medical doctor to establish long term blood pressure treatment goals.      Diagnosis: Urinary retention  Assessment and Plan of Treatment: Follow up with Urology as scheduled    Diagnosis: Liver cyst  Assessment and Plan of Treatment: Follow up with PMD for managment and referral to hepatology/GI    Diagnosis: Imaging abnormality  Assessment and Plan of Treatment: Your CT scan shows bladder wall thickening concerning for possible infection but your urinalysis and urine culture were negative. Please follow up with your pcp for further monitoring.    Diagnosis: H/O spinal fusion  Assessment and Plan of Treatment: CT shows   *  Extensive osseous fusion of the spine and findings suggestive of   bilateral sacroiliitis, overall similar in comparison to the prior exam   of 6/3/2021. Differential consideration is given to ankylosing   spondylitis or other inflammatory arthropathy.  Please follow up with your orthopedic doctors        PRINCIPAL DISCHARGE DIAGNOSIS  Diagnosis: Hypokalemia  Assessment and Plan of Treatment: Improved  Follow up with PMD for managment of electrolytes      SECONDARY DISCHARGE DIAGNOSES  Diagnosis: Hyponatremia  Assessment and Plan of Treatment: Improved  Follow up with PMD for managment of electrolytes    Diagnosis: Dehydration  Assessment and Plan of Treatment: Continue supportive care, hydration  Follow up with PMD for managment and repeat lab work    Diagnosis: Chronic generalized pain disorder  Assessment and Plan of Treatment: Continue pain regimen  Follow up with Rheumalogy for lab work    Diagnosis: HTN (hypertension)  Assessment and Plan of Treatment: Follow up with your medical doctor to establish long term blood pressure treatment goals.      Diagnosis: Urinary retention  Assessment and Plan of Treatment: Follow up with Urology as scheduled    Diagnosis: Liver cyst  Assessment and Plan of Treatment: Follow up with PMD for managment and referral to hepatology/GI    Diagnosis: Imaging abnormality  Assessment and Plan of Treatment: Your CT scan shows bladder wall thickening concerning for possible infection but your urinalysis and urine culture were negative. Please follow up with your pcp for further monitoring.    Diagnosis: H/O spinal fusion  Assessment and Plan of Treatment: CT shows   *  Extensive osseous fusion of the spine and findings suggestive of   bilateral sacroiliitis, overall similar in comparison to the prior exam   of 6/3/2021. Differential consideration is given to ankylosing   spondylitis or other inflammatory arthropathy.  Please follow up with your orthopedic doctors

## 2023-10-09 NOTE — DISCHARGE NOTE PROVIDER - NSDCMRMEDTOKEN_GEN_ALL_CORE_FT
Astepro 137 mcg/inh (0.1%) nasal spray: 2 spray(s) intranasally 2 times a day  atorvastatin 40 mg oral tablet: 1 tab(s) orally once a day  chlorthalidone 25 mg oral tablet: 1 tab(s) orally once a day  DULoxetine 40 mg oral delayed release capsule: 1 cap(s) orally once a day  ergocalciferol 1.25 mg (50,000 intl units) oral capsule: 1 cap(s) orally every 7 days  famotidine 40 mg oral tablet: 1 tab(s) orally once a day (at bedtime)  gabapentin 300 mg oral tablet: 1 tab(s) orally every 8 hours  Gemtesa 75 mg oral tablet: 1 tab(s) orally once a day  Glucosamine Chondroitin oral capsule: 3 cap(s) orally once a day. Instructed to stop on 12/30/21.  losartan 100 mg oral tablet: 1 tab(s) orally once a day  methadone 5 mg oral tablet: 1 tab(s) orally every 8 hours  naproxen 250 mg oral tablet: 1 tab(s) orally every 12 hours as needed for  moderate pain  Ozempic 4 mg/3 mL (1 mg dose) subcutaneous solution: 1 milligram(s) subcutaneously  pramipexole 3.75 mg oral tablet, extended release: 1 tab(s) orally once a day  Prolia 60 mg/mL subcutaneous solution: 60 milligram(s) subcutaneously every 6 months  Singulair 10 mg oral tablet: 1 tab(s) orally once a day  Turmeric 500 mg oral capsule: 1 cap(s) orally once a day. Instructed to stop on 12/30/21.  Tylenol : used daily as needed   acetaminophen 325 mg oral tablet: 2 tab(s) orally every 6 hours As needed Temp greater or equal to 38C (100.4F), Mild Pain (1 - 3)  Astepro 137 mcg/inh (0.1%) nasal spray: 2 spray(s) intranasally 2 times a day  atorvastatin 40 mg oral tablet: 1 tab(s) orally once a day  diclofenac 1% topical gel: Apply topically to affected area 2 times a day  DULoxetine 40 mg oral delayed release capsule: 1 cap(s) orally once a day  ergocalciferol 1.25 mg (50,000 intl units) oral capsule: 1 cap(s) orally every 7 days  famotidine 40 mg oral tablet: 1 tab(s) orally once a day (at bedtime)  Gemtesa 75 mg oral tablet: 1 tab(s) orally once a day  losartan 100 mg oral tablet: 1 tab(s) orally once a day  naproxen 250 mg oral tablet: 1 tab(s) orally every 12 hours as needed for  moderate pain  Ozempic 4 mg/3 mL (1 mg dose) subcutaneous solution: 1 milligram(s) subcutaneously  polyethylene glycol 3350 oral powder for reconstitution: 17 gram(s) orally once a day  pramipexole 3.75 mg oral tablet, extended release: 1 tab(s) orally once a day  Prolia 60 mg/mL subcutaneous solution: 60 milligram(s) subcutaneously every 6 months  senna leaf extract oral tablet: 2 tab(s) orally once a day (at bedtime)  Singulair 10 mg oral tablet: 1 tab(s) orally once a day  Turmeric 500 mg oral capsule: 1 cap(s) orally once a day. Instructed to stop on 12/30/21.   acetaminophen 325 mg oral tablet: 2 tab(s) orally every 6 hours As needed Temp greater or equal to 38C (100.4F), Mild Pain (1 - 3)  Astepro 137 mcg/inh (0.1%) nasal spray: 2 spray(s) intranasally 2 times a day  atorvastatin 40 mg oral tablet: 1 tab(s) orally once a day  diclofenac 1% topical gel: Apply topically to affected area 2 times a day  DULoxetine 40 mg oral delayed release capsule: 1 cap(s) orally once a day  ergocalciferol 1.25 mg (50,000 intl units) oral capsule: 1 cap(s) orally every 7 days  famotidine 40 mg oral tablet: 1 tab(s) orally once a day (at bedtime)  Gemtesa 75 mg oral tablet: 1 tab(s) orally once a day  losartan 100 mg oral tablet: 1 tab(s) orally once a day  naproxen 250 mg oral tablet: 1 tab(s) orally every 12 hours as needed for  moderate pain  out patient PT: As directed  Ozempic 4 mg/3 mL (1 mg dose) subcutaneous solution: 1 milligram(s) subcutaneously  polyethylene glycol 3350 oral powder for reconstitution: 17 gram(s) orally once a day  pramipexole 3.75 mg oral tablet, extended release: 1 tab(s) orally once a day  Prolia 60 mg/mL subcutaneous solution: 60 milligram(s) subcutaneously every 6 months  senna leaf extract oral tablet: 2 tab(s) orally once a day (at bedtime)  Singulair 10 mg oral tablet: 1 tab(s) orally once a day  Turmeric 500 mg oral capsule: 1 cap(s) orally once a day. Instructed to stop on 12/30/21.

## 2023-10-09 NOTE — DISCHARGE NOTE NURSING/CASE MANAGEMENT/SOCIAL WORK - PATIENT PORTAL LINK FT
You can access the FollowMyHealth Patient Portal offered by [facility] by registering at the following website: [email or web address]. By joining [employer]’s FollowMyHealth portal, you will also be able to view your health information using other applications (apps) compatible with our system.

## 2023-10-09 NOTE — DISCHARGE NOTE PROVIDER - NSDCFUSCHEDAPPT_GEN_ALL_CORE_FT
Cristy Wayne  [facility]  Scheduled Appointment: 12/19/2023    Cristy Wayne  [facility] Physician [facility]  UROGYN [address] [place] [address]  Scheduled Appointment: 12/19/2023

## 2023-10-09 NOTE — DISCHARGE NOTE PROVIDER - CARE PROVIDERS DIRECT ADDRESSES
[email or web address]

## 2023-10-09 NOTE — DISCHARGE NOTE PROVIDER - ATTENDING DISCHARGE PHYSICAL EXAMINATION:
PHYSICAL EXAM:    Vital Signs Last 24 Hrs  T(C): 36.9 (09 Oct 2023 10:12), Max: 37.3 (09 Oct 2023 04:00)  T(F): 98.4 (09 Oct 2023 10:12), Max: 99.1 (09 Oct 2023 04:00)  HR: 103 (09 Oct 2023 10:12) (83 - 103)  BP: 92/63 (09 Oct 2023 10:12) (92/63 - 138/78)  BP(mean): --  RR: 19 (09 Oct 2023 10:12) (17 - 19)  SpO2: 96% (09 Oct 2023 10:12) (93% - 96%)    General: No acute distress.  HEENT: Moist MM.  No oropharyngeal exudates.    Neck: Supple.  Full ROM.  No JVD.   Heart: RRR.  Normal S1 and S2.  No murmurs, rubs, or gallops.   Lungs: CTAB. No wheezes, crackles, or rhonchi.    Abdomen: BS+, soft, NT/ND.    Skin: Warm and dry.  No rashes.  Extremities: No edema, clubbing, or cyanosis.    Neuro: A&Ox3.

## 2023-10-09 NOTE — DISCHARGE NOTE PROVIDER - DETAILS OF MALNUTRITION DIAGNOSIS/DIAGNOSES
This patient has been assessed with a concern for Malnutrition and was treated during this hospitalization for the following Nutrition diagnosis/diagnoses:     -  10/08/2023: Mild protein-calorie malnutrition

## 2023-10-10 LAB
CCP IGG SERPL-ACNC: <8 UNITS — SIGNIFICANT CHANGE UP
RF+CCP IGG SER-IMP: NEGATIVE — SIGNIFICANT CHANGE UP

## 2023-10-11 LAB — ANA TITR SER: NEGATIVE — SIGNIFICANT CHANGE UP

## 2023-10-12 ENCOUNTER — APPOINTMENT (OUTPATIENT)
Dept: INTERNAL MEDICINE | Facility: CLINIC | Age: 73
End: 2023-10-12
Payer: MEDICARE

## 2023-10-12 ENCOUNTER — OUTPATIENT (OUTPATIENT)
Dept: OUTPATIENT SERVICES | Facility: HOSPITAL | Age: 73
LOS: 1 days | End: 2023-10-12
Payer: MEDICARE

## 2023-10-12 DIAGNOSIS — Z96.651 PRESENCE OF RIGHT ARTIFICIAL KNEE JOINT: Chronic | ICD-10-CM

## 2023-10-12 DIAGNOSIS — Z98.890 OTHER SPECIFIED POSTPROCEDURAL STATES: Chronic | ICD-10-CM

## 2023-10-12 DIAGNOSIS — I10 ESSENTIAL (PRIMARY) HYPERTENSION: ICD-10-CM

## 2023-10-12 DIAGNOSIS — R11.2 NAUSEA WITH VOMITING, UNSPECIFIED: ICD-10-CM

## 2023-10-12 DIAGNOSIS — Z98.49 CATARACT EXTRACTION STATUS, UNSPECIFIED EYE: Chronic | ICD-10-CM

## 2023-10-12 PROCEDURE — G0463: CPT

## 2023-10-12 PROCEDURE — 99214 OFFICE O/P EST MOD 30 MIN: CPT

## 2023-10-13 VITALS — HEART RATE: 88 BPM | DIASTOLIC BLOOD PRESSURE: 60 MMHG | SYSTOLIC BLOOD PRESSURE: 120 MMHG

## 2023-10-16 DIAGNOSIS — R11.2 NAUSEA WITH VOMITING, UNSPECIFIED: ICD-10-CM

## 2023-10-17 ENCOUNTER — OUTPATIENT (OUTPATIENT)
Dept: OUTPATIENT SERVICES | Facility: HOSPITAL | Age: 73
LOS: 1 days | End: 2023-10-17
Payer: MEDICARE

## 2023-10-17 ENCOUNTER — RESULT REVIEW (OUTPATIENT)
Age: 73
End: 2023-10-17

## 2023-10-17 DIAGNOSIS — Z96.651 PRESENCE OF RIGHT ARTIFICIAL KNEE JOINT: Chronic | ICD-10-CM

## 2023-10-17 DIAGNOSIS — Z98.49 CATARACT EXTRACTION STATUS, UNSPECIFIED EYE: Chronic | ICD-10-CM

## 2023-10-17 DIAGNOSIS — Z98.890 OTHER SPECIFIED POSTPROCEDURAL STATES: Chronic | ICD-10-CM

## 2023-10-17 DIAGNOSIS — R11.2 NAUSEA WITH VOMITING, UNSPECIFIED: ICD-10-CM

## 2023-10-17 PROCEDURE — 74220 X-RAY XM ESOPHAGUS 1CNTRST: CPT | Mod: 26

## 2023-10-17 PROCEDURE — 74220 X-RAY XM ESOPHAGUS 1CNTRST: CPT

## 2023-10-18 ENCOUNTER — APPOINTMENT (OUTPATIENT)
Dept: GASTROENTEROLOGY | Facility: CLINIC | Age: 73
End: 2023-10-18
Payer: MEDICARE

## 2023-10-18 VITALS
DIASTOLIC BLOOD PRESSURE: 68 MMHG | OXYGEN SATURATION: 97 % | BODY MASS INDEX: 32.2 KG/M2 | HEIGHT: 62 IN | SYSTOLIC BLOOD PRESSURE: 117 MMHG | WEIGHT: 175 LBS | HEART RATE: 107 BPM

## 2023-10-18 DIAGNOSIS — R13.12 DYSPHAGIA, OROPHARYNGEAL PHASE: ICD-10-CM

## 2023-10-18 DIAGNOSIS — R11.2 NAUSEA WITH VOMITING, UNSPECIFIED: ICD-10-CM

## 2023-10-18 PROCEDURE — 99214 OFFICE O/P EST MOD 30 MIN: CPT

## 2023-10-20 ENCOUNTER — APPOINTMENT (OUTPATIENT)
Dept: SURGERY | Facility: CLINIC | Age: 73
End: 2023-10-20
Payer: MEDICARE

## 2023-10-20 VITALS
TEMPERATURE: 98.7 F | DIASTOLIC BLOOD PRESSURE: 91 MMHG | HEIGHT: 62 IN | OXYGEN SATURATION: 98 % | WEIGHT: 175 LBS | HEART RATE: 88 BPM | SYSTOLIC BLOOD PRESSURE: 171 MMHG | BODY MASS INDEX: 32.2 KG/M2 | RESPIRATION RATE: 17 BRPM

## 2023-10-20 DIAGNOSIS — K44.9 DIAPHRAGMATIC HERNIA W/OUT OBSTRUCTION OR GANGRENE: ICD-10-CM

## 2023-10-20 DIAGNOSIS — Z12.11 ENCOUNTER FOR SCREENING FOR MALIGNANT NEOPLASM OF COLON: ICD-10-CM

## 2023-10-20 DIAGNOSIS — R93.5 ABNORMAL FINDINGS ON DIAGNOSTIC IMAGING OF OTHER ABDOMINAL REGIONS, INCLUDING RETROPERITONEUM: ICD-10-CM

## 2023-10-20 PROCEDURE — 99205 OFFICE O/P NEW HI 60 MIN: CPT

## 2023-10-23 ENCOUNTER — TRANSCRIPTION ENCOUNTER (OUTPATIENT)
Age: 73
End: 2023-10-23

## 2023-10-23 DIAGNOSIS — Z01.818 ENCOUNTER FOR OTHER PREPROCEDURAL EXAMINATION: ICD-10-CM

## 2023-10-24 ENCOUNTER — APPOINTMENT (OUTPATIENT)
Dept: GASTROENTEROLOGY | Facility: AMBULATORY MEDICAL SERVICES | Age: 73
End: 2023-10-24
Payer: MEDICARE

## 2023-10-24 PROCEDURE — 45378 DIAGNOSTIC COLONOSCOPY: CPT | Mod: PT

## 2023-10-24 PROCEDURE — 43239 EGD BIOPSY SINGLE/MULTIPLE: CPT

## 2023-10-29 ENCOUNTER — RX RENEWAL (OUTPATIENT)
Age: 73
End: 2023-10-29

## 2023-11-03 ENCOUNTER — APPOINTMENT (OUTPATIENT)
Dept: SURGERY | Facility: CLINIC | Age: 73
End: 2023-11-03
Payer: MEDICARE

## 2023-11-03 VITALS
RESPIRATION RATE: 18 BRPM | SYSTOLIC BLOOD PRESSURE: 193 MMHG | HEART RATE: 79 BPM | TEMPERATURE: 97.8 F | DIASTOLIC BLOOD PRESSURE: 80 MMHG | OXYGEN SATURATION: 99 %

## 2023-11-03 DIAGNOSIS — K44.9 DIAPHRAGMATIC HERNIA W/OUT OBSTRUCTION OR GANGRENE: ICD-10-CM

## 2023-11-03 PROCEDURE — 99215 OFFICE O/P EST HI 40 MIN: CPT

## 2023-11-06 PROBLEM — K44.9 PARAESOPHAGEAL HERNIA: Status: ACTIVE | Noted: 2023-10-18

## 2023-11-09 ENCOUNTER — APPOINTMENT (OUTPATIENT)
Dept: NUCLEAR MEDICINE | Facility: HOSPITAL | Age: 73
End: 2023-11-09

## 2023-11-09 ENCOUNTER — OUTPATIENT (OUTPATIENT)
Dept: OUTPATIENT SERVICES | Facility: HOSPITAL | Age: 73
LOS: 1 days | End: 2023-11-09
Payer: MEDICARE

## 2023-11-09 DIAGNOSIS — Z98.49 CATARACT EXTRACTION STATUS, UNSPECIFIED EYE: Chronic | ICD-10-CM

## 2023-11-09 DIAGNOSIS — K44.9 DIAPHRAGMATIC HERNIA WITHOUT OBSTRUCTION OR GANGRENE: ICD-10-CM

## 2023-11-09 DIAGNOSIS — Z96.651 PRESENCE OF RIGHT ARTIFICIAL KNEE JOINT: Chronic | ICD-10-CM

## 2023-11-09 DIAGNOSIS — Z98.890 OTHER SPECIFIED POSTPROCEDURAL STATES: Chronic | ICD-10-CM

## 2023-11-09 PROCEDURE — 78264 GASTRIC EMPTYING IMG STUDY: CPT | Mod: 26

## 2023-11-13 ENCOUNTER — OUTPATIENT (OUTPATIENT)
Dept: OUTPATIENT SERVICES | Facility: HOSPITAL | Age: 73
LOS: 1 days | End: 2023-11-13
Payer: MEDICARE

## 2023-11-13 VITALS
OXYGEN SATURATION: 99 % | DIASTOLIC BLOOD PRESSURE: 84 MMHG | HEIGHT: 62 IN | RESPIRATION RATE: 16 BRPM | SYSTOLIC BLOOD PRESSURE: 151 MMHG | WEIGHT: 175.93 LBS | HEART RATE: 88 BPM | TEMPERATURE: 98 F

## 2023-11-13 DIAGNOSIS — Z96.651 PRESENCE OF RIGHT ARTIFICIAL KNEE JOINT: Chronic | ICD-10-CM

## 2023-11-13 DIAGNOSIS — K44.9 DIAPHRAGMATIC HERNIA WITHOUT OBSTRUCTION OR GANGRENE: ICD-10-CM

## 2023-11-13 DIAGNOSIS — I10 ESSENTIAL (PRIMARY) HYPERTENSION: ICD-10-CM

## 2023-11-13 DIAGNOSIS — Z86.59 PERSONAL HISTORY OF OTHER MENTAL AND BEHAVIORAL DISORDERS: ICD-10-CM

## 2023-11-13 DIAGNOSIS — Z96.643 PRESENCE OF ARTIFICIAL HIP JOINT, BILATERAL: Chronic | ICD-10-CM

## 2023-11-13 DIAGNOSIS — Z29.9 ENCOUNTER FOR PROPHYLACTIC MEASURES, UNSPECIFIED: ICD-10-CM

## 2023-11-13 DIAGNOSIS — Z01.818 ENCOUNTER FOR OTHER PREPROCEDURAL EXAMINATION: ICD-10-CM

## 2023-11-13 DIAGNOSIS — Z98.890 OTHER SPECIFIED POSTPROCEDURAL STATES: Chronic | ICD-10-CM

## 2023-11-13 DIAGNOSIS — Z98.49 CATARACT EXTRACTION STATUS, UNSPECIFIED EYE: Chronic | ICD-10-CM

## 2023-11-13 LAB
ANION GAP SERPL CALC-SCNC: 11 MMOL/L — SIGNIFICANT CHANGE UP (ref 5–17)
ANION GAP SERPL CALC-SCNC: 11 MMOL/L — SIGNIFICANT CHANGE UP (ref 5–17)
BLD GP AB SCN SERPL QL: NEGATIVE — SIGNIFICANT CHANGE UP
BLD GP AB SCN SERPL QL: NEGATIVE — SIGNIFICANT CHANGE UP
BUN SERPL-MCNC: 27 MG/DL — HIGH (ref 7–23)
BUN SERPL-MCNC: 27 MG/DL — HIGH (ref 7–23)
CALCIUM SERPL-MCNC: 9.7 MG/DL — SIGNIFICANT CHANGE UP (ref 8.4–10.5)
CALCIUM SERPL-MCNC: 9.7 MG/DL — SIGNIFICANT CHANGE UP (ref 8.4–10.5)
CHLORIDE SERPL-SCNC: 102 MMOL/L — SIGNIFICANT CHANGE UP (ref 96–108)
CHLORIDE SERPL-SCNC: 102 MMOL/L — SIGNIFICANT CHANGE UP (ref 96–108)
CO2 SERPL-SCNC: 26 MMOL/L — SIGNIFICANT CHANGE UP (ref 22–31)
CO2 SERPL-SCNC: 26 MMOL/L — SIGNIFICANT CHANGE UP (ref 22–31)
CREAT SERPL-MCNC: 0.86 MG/DL — SIGNIFICANT CHANGE UP (ref 0.5–1.3)
CREAT SERPL-MCNC: 0.86 MG/DL — SIGNIFICANT CHANGE UP (ref 0.5–1.3)
EGFR: 70 ML/MIN/1.73M2 — SIGNIFICANT CHANGE UP
EGFR: 70 ML/MIN/1.73M2 — SIGNIFICANT CHANGE UP
GLUCOSE SERPL-MCNC: 130 MG/DL — HIGH (ref 70–99)
GLUCOSE SERPL-MCNC: 130 MG/DL — HIGH (ref 70–99)
HCT VFR BLD CALC: 36.9 % — SIGNIFICANT CHANGE UP (ref 34.5–45)
HCT VFR BLD CALC: 36.9 % — SIGNIFICANT CHANGE UP (ref 34.5–45)
HGB BLD-MCNC: 11.6 G/DL — SIGNIFICANT CHANGE UP (ref 11.5–15.5)
HGB BLD-MCNC: 11.6 G/DL — SIGNIFICANT CHANGE UP (ref 11.5–15.5)
MCHC RBC-ENTMCNC: 29.1 PG — SIGNIFICANT CHANGE UP (ref 27–34)
MCHC RBC-ENTMCNC: 29.1 PG — SIGNIFICANT CHANGE UP (ref 27–34)
MCHC RBC-ENTMCNC: 31.4 GM/DL — LOW (ref 32–36)
MCHC RBC-ENTMCNC: 31.4 GM/DL — LOW (ref 32–36)
MCV RBC AUTO: 92.5 FL — SIGNIFICANT CHANGE UP (ref 80–100)
MCV RBC AUTO: 92.5 FL — SIGNIFICANT CHANGE UP (ref 80–100)
NRBC # BLD: 0 /100 WBCS — SIGNIFICANT CHANGE UP (ref 0–0)
NRBC # BLD: 0 /100 WBCS — SIGNIFICANT CHANGE UP (ref 0–0)
PLATELET # BLD AUTO: 300 K/UL — SIGNIFICANT CHANGE UP (ref 150–400)
PLATELET # BLD AUTO: 300 K/UL — SIGNIFICANT CHANGE UP (ref 150–400)
POTASSIUM SERPL-MCNC: 3.7 MMOL/L — SIGNIFICANT CHANGE UP (ref 3.5–5.3)
POTASSIUM SERPL-MCNC: 3.7 MMOL/L — SIGNIFICANT CHANGE UP (ref 3.5–5.3)
POTASSIUM SERPL-SCNC: 3.7 MMOL/L — SIGNIFICANT CHANGE UP (ref 3.5–5.3)
POTASSIUM SERPL-SCNC: 3.7 MMOL/L — SIGNIFICANT CHANGE UP (ref 3.5–5.3)
RBC # BLD: 3.99 M/UL — SIGNIFICANT CHANGE UP (ref 3.8–5.2)
RBC # BLD: 3.99 M/UL — SIGNIFICANT CHANGE UP (ref 3.8–5.2)
RBC # FLD: 14.6 % — HIGH (ref 10.3–14.5)
RBC # FLD: 14.6 % — HIGH (ref 10.3–14.5)
RH IG SCN BLD-IMP: NEGATIVE — SIGNIFICANT CHANGE UP
RH IG SCN BLD-IMP: NEGATIVE — SIGNIFICANT CHANGE UP
SODIUM SERPL-SCNC: 139 MMOL/L — SIGNIFICANT CHANGE UP (ref 135–145)
SODIUM SERPL-SCNC: 139 MMOL/L — SIGNIFICANT CHANGE UP (ref 135–145)
WBC # BLD: 7.14 K/UL — SIGNIFICANT CHANGE UP (ref 3.8–10.5)
WBC # BLD: 7.14 K/UL — SIGNIFICANT CHANGE UP (ref 3.8–10.5)
WBC # FLD AUTO: 7.14 K/UL — SIGNIFICANT CHANGE UP (ref 3.8–10.5)
WBC # FLD AUTO: 7.14 K/UL — SIGNIFICANT CHANGE UP (ref 3.8–10.5)

## 2023-11-13 PROCEDURE — 87086 URINE CULTURE/COLONY COUNT: CPT

## 2023-11-13 PROCEDURE — 87641 MR-STAPH DNA AMP PROBE: CPT

## 2023-11-13 PROCEDURE — 97161 PT EVAL LOW COMPLEX 20 MIN: CPT

## 2023-11-13 PROCEDURE — 84295 ASSAY OF SERUM SODIUM: CPT

## 2023-11-13 PROCEDURE — 82803 BLOOD GASES ANY COMBINATION: CPT

## 2023-11-13 PROCEDURE — 84540 ASSAY OF URINE/UREA-N: CPT

## 2023-11-13 PROCEDURE — 86038 ANTINUCLEAR ANTIBODIES: CPT

## 2023-11-13 PROCEDURE — 84300 ASSAY OF URINE SODIUM: CPT

## 2023-11-13 PROCEDURE — 87640 STAPH A DNA AMP PROBE: CPT

## 2023-11-13 PROCEDURE — 96374 THER/PROPH/DIAG INJ IV PUSH: CPT

## 2023-11-13 PROCEDURE — 80053 COMPREHEN METABOLIC PANEL: CPT

## 2023-11-13 PROCEDURE — 97165 OT EVAL LOW COMPLEX 30 MIN: CPT

## 2023-11-13 PROCEDURE — 86900 BLOOD TYPING SEROLOGIC ABO: CPT

## 2023-11-13 PROCEDURE — 84484 ASSAY OF TROPONIN QUANT: CPT

## 2023-11-13 PROCEDURE — 85730 THROMBOPLASTIN TIME PARTIAL: CPT

## 2023-11-13 PROCEDURE — 80061 LIPID PANEL: CPT

## 2023-11-13 PROCEDURE — 70450 CT HEAD/BRAIN W/O DYE: CPT | Mod: MA

## 2023-11-13 PROCEDURE — 83735 ASSAY OF MAGNESIUM: CPT

## 2023-11-13 PROCEDURE — G0463: CPT

## 2023-11-13 PROCEDURE — 82947 ASSAY GLUCOSE BLOOD QUANT: CPT

## 2023-11-13 PROCEDURE — 96375 TX/PRO/DX INJ NEW DRUG ADDON: CPT

## 2023-11-13 PROCEDURE — 86901 BLOOD TYPING SEROLOGIC RH(D): CPT

## 2023-11-13 PROCEDURE — 82570 ASSAY OF URINE CREATININE: CPT

## 2023-11-13 PROCEDURE — 94640 AIRWAY INHALATION TREATMENT: CPT

## 2023-11-13 PROCEDURE — 84133 ASSAY OF URINE POTASSIUM: CPT

## 2023-11-13 PROCEDURE — 82435 ASSAY OF BLOOD CHLORIDE: CPT

## 2023-11-13 PROCEDURE — 84156 ASSAY OF PROTEIN URINE: CPT

## 2023-11-13 PROCEDURE — 85018 HEMOGLOBIN: CPT

## 2023-11-13 PROCEDURE — 85025 COMPLETE CBC W/AUTO DIFF WBC: CPT

## 2023-11-13 PROCEDURE — 82330 ASSAY OF CALCIUM: CPT

## 2023-11-13 PROCEDURE — 86780 TREPONEMA PALLIDUM: CPT

## 2023-11-13 PROCEDURE — 84443 ASSAY THYROID STIM HORMONE: CPT

## 2023-11-13 PROCEDURE — 83036 HEMOGLOBIN GLYCOSYLATED A1C: CPT

## 2023-11-13 PROCEDURE — 81374 HLA I TYPING 1 ANTIGEN LR: CPT

## 2023-11-13 PROCEDURE — 85652 RBC SED RATE AUTOMATED: CPT

## 2023-11-13 PROCEDURE — 82746 ASSAY OF FOLIC ACID SERUM: CPT

## 2023-11-13 PROCEDURE — 86200 CCP ANTIBODY: CPT

## 2023-11-13 PROCEDURE — 36415 COLL VENOUS BLD VENIPUNCTURE: CPT

## 2023-11-13 PROCEDURE — 80048 BASIC METABOLIC PNL TOTAL CA: CPT

## 2023-11-13 PROCEDURE — 96376 TX/PRO/DX INJ SAME DRUG ADON: CPT

## 2023-11-13 PROCEDURE — 85027 COMPLETE CBC AUTOMATED: CPT

## 2023-11-13 PROCEDURE — 83930 ASSAY OF BLOOD OSMOLALITY: CPT

## 2023-11-13 PROCEDURE — 83690 ASSAY OF LIPASE: CPT

## 2023-11-13 PROCEDURE — 84132 ASSAY OF SERUM POTASSIUM: CPT

## 2023-11-13 PROCEDURE — 83605 ASSAY OF LACTIC ACID: CPT

## 2023-11-13 PROCEDURE — 85014 HEMATOCRIT: CPT

## 2023-11-13 PROCEDURE — 81001 URINALYSIS AUTO W/SCOPE: CPT

## 2023-11-13 PROCEDURE — 84100 ASSAY OF PHOSPHORUS: CPT

## 2023-11-13 PROCEDURE — 99285 EMERGENCY DEPT VISIT HI MDM: CPT | Mod: 25

## 2023-11-13 PROCEDURE — 85610 PROTHROMBIN TIME: CPT

## 2023-11-13 PROCEDURE — 83935 ASSAY OF URINE OSMOLALITY: CPT

## 2023-11-13 PROCEDURE — 86431 RHEUMATOID FACTOR QUANT: CPT

## 2023-11-13 PROCEDURE — 74177 CT ABD & PELVIS W/CONTRAST: CPT | Mod: MA

## 2023-11-13 PROCEDURE — 86140 C-REACTIVE PROTEIN: CPT

## 2023-11-13 PROCEDURE — 82607 VITAMIN B-12: CPT

## 2023-11-13 PROCEDURE — 86850 RBC ANTIBODY SCREEN: CPT

## 2023-11-13 RX ORDER — SEMAGLUTIDE 0.68 MG/ML
1 INJECTION, SOLUTION SUBCUTANEOUS
Refills: 0 | DISCHARGE

## 2023-11-13 RX ORDER — VIBEGRON 75 MG/1
1 TABLET, FILM COATED ORAL
Refills: 0 | DISCHARGE

## 2023-11-13 RX ORDER — AZELASTINE 137 UG/1
2 SPRAY, METERED NASAL
Refills: 0 | DISCHARGE

## 2023-11-13 RX ORDER — LIDOCAINE HCL 20 MG/ML
0.2 VIAL (ML) INJECTION ONCE
Refills: 0 | Status: COMPLETED | OUTPATIENT
Start: 2023-11-30 | End: 2023-11-30

## 2023-11-13 RX ORDER — DULOXETINE HYDROCHLORIDE 30 MG/1
1 CAPSULE, DELAYED RELEASE ORAL
Refills: 0 | DISCHARGE

## 2023-11-13 RX ORDER — ERGOCALCIFEROL 1.25 MG/1
1 CAPSULE ORAL
Refills: 0 | DISCHARGE

## 2023-11-13 RX ORDER — CHLORHEXIDINE GLUCONATE 213 G/1000ML
1 SOLUTION TOPICAL ONCE
Refills: 0 | Status: DISCONTINUED | OUTPATIENT
Start: 2023-11-30 | End: 2023-11-30

## 2023-11-13 RX ORDER — SODIUM CHLORIDE 9 MG/ML
3 INJECTION INTRAMUSCULAR; INTRAVENOUS; SUBCUTANEOUS EVERY 8 HOURS
Refills: 0 | Status: DISCONTINUED | OUTPATIENT
Start: 2023-11-30 | End: 2023-11-30

## 2023-11-13 RX ORDER — VANCOMYCIN HCL 1 G
1250 VIAL (EA) INTRAVENOUS ONCE
Refills: 0 | Status: DISCONTINUED | OUTPATIENT
Start: 2023-11-30 | End: 2023-11-30

## 2023-11-13 RX ORDER — MILK THISTLE 150 MG
1 CAPSULE ORAL
Qty: 0 | Refills: 0 | DISCHARGE

## 2023-11-13 RX ORDER — ATORVASTATIN CALCIUM 80 MG/1
1 TABLET, FILM COATED ORAL
Refills: 0 | DISCHARGE

## 2023-11-13 NOTE — H&P PST ADULT - NSICDXPASTSURGICALHX_GEN_ALL_CORE_FT
PAST SURGICAL HISTORY:  H/O arthroscopy of knee June 2021    H/O total knee replacement, right October 2020    Removal of pin, plate, abigail, or screw 1991- removal of billings abigail    S/P cataract surgery     S/P dilatation and curettage 1981    S/P hip replacement left (2008)    S/P hysterectomy 1982    S/P repair of paraesophageal hernia 2001    S/P shoulder surgery right (2012)    S/P spinal fusion L4-L5 1966    S/P spinal surgery x 2 1985, 1986    Scoliosis s/p placement of billings abigail x 2 1984     PAST SURGICAL HISTORY:  H/O arthroscopy of knee June 2021    History of bilateral hip replacements     Removal of pin, plate, abigail, or screw 1991- removal of billings abigail    S/P cataract surgery     S/P dilatation and curettage 1981    S/P hip replacement left (2008)    S/P hysterectomy 1982    S/P repair of paraesophageal hernia 2001    S/P shoulder surgery right (2012)    S/P spinal fusion L4-L5 1966    S/P spinal surgery x 2 1985, 1986    Scoliosis s/p placement of billings abigail x 2 1984

## 2023-11-13 NOTE — H&P PST ADULT - ATTENDING COMMENTS
no diplopia/no photophobia/no lacrimation L/no lacrimation R/no blurred vision L/no blurred vision R/no discharge L/no discharge R/no pain L/no pain R/no irritation L/no irritation R/no loss of vision L/no loss of vision R/no scleral injection L/no scleral injection R I saw and examined the patient, and reviewed  the history with the patient and   Agree with note which was also reviewed and edited where appropriate.  D/W patient, RN, residents and Fellow  plan repair and evaluation of previous HH repair

## 2023-11-13 NOTE — H&P PST ADULT - HISTORY OF PRESENT ILLNESS
73 year old female presents for laparoscopic recurrent hiatal hernia repair. Pt. with a history of hiatal hernia repair 20 years ago and reports experiencing a chronic cough and post prandial retching. s/p diagnostic imaging which confirmed recurrence of hiatal hernia.    Pt. reports having COVID-19 in 12/20 and pneumonia, managed outpatient.

## 2023-11-13 NOTE — H&P PST ADULT - PROBLEM SELECTOR PLAN 5
Pt. called PST to speak with NP 11/27/23 and informed NP that she has been taking THC "pot" gummys nightly to help her sleep.   Pt. instructed to stop 3 days prior to procedure

## 2023-11-13 NOTE — H&P PST ADULT - ASSESSMENT
Partial upper denture Airway  Mallampati III  Partial upper denture    Activity  Minimal ADLs with rolling walker  DASI <4    CAPRINI VTE 2.0 SCORE [CLOT updated 2019]    AGE RELATED RISK FACTORS                                                       MOBILITY RELATED FACTORS  [ ] Age 41-60 years                                            (1 Point)                    [ ] Bed rest                                                        (1 Point)  [ ] Age: 61-74 years                                           (2 Points)                  [ ] Plaster cast                                                   (2 Points)  [X ] Age= 75 years                                              (3 Points)                    [ ] Bed bound for more than 72 hours                 (2 Points)    DISEASE RELATED RISK FACTORS                                               GENDER SPECIFIC FACTORS  [ ] Edema in the lower extremities                       (1 Point)              [ ] Pregnancy                                                     (1 Point)  [ ] Varicose veins                                               (1 Point)                     [ ] Post-partum < 6 weeks                                   (1 Point)             [ X] BMI > 25 Kg/m2                                            (1 Point)                     [ ] Hormonal therapy  or oral contraception          (1 Point)                 [ ] Sepsis (in the previous month)                        (1 Point)               [ ] History of pregnancy complications                 (1 point)  [ ] Pneumonia or serious lung disease                                               [ ] Unexplained or recurrent                     (1 Point)           (in the previous month)                               (1 Point)  [ ] Abnormal pulmonary function test                     (1 Point)                 SURGERY RELATED RISK FACTORS  [ ] Acute myocardial infarction                              (1 Point)               [ ]  Section                                             (1 Point)  [ ] Congestive heart failure (in the previous month)  (1 Point)      [ ] Minor surgery                                                  (1 Point)   [ ] Inflammatory bowel disease                             (1 Point)               [ ] Arthroscopic surgery                                        (2 Points)  [ ] Central venous access                                      (2 Points)                [X ] General surgery lasting more than 45 minutes (2 points)  [ ] Malignancy- Present or previous                   (2 Points)                [ ] Elective arthroplasty                                         (5 points)    [ ] Stroke (in the previous month)                          (5 Points)                                                                                                                                                           HEMATOLOGY RELATED FACTORS                                                 TRAUMA RELATED RISK FACTORS  [X ] Prior episodes of VTE                                     (3 Points)                [ ] Fracture of the hip, pelvis, or leg                       (5 Points)  [ ] Positive family history for VTE                         (3 Points)             [ ] Acute spinal cord injury (in the previous month)  (5 Points)  [ ] Prothrombin 80118 A                                     (3 Points)               [ ] Paralysis  (less than 1 month)                             (5 Points)  [ ] Factor V Leiden                                             (3 Points)                  [ ] Multiple Trauma within 1 month                        (5 Points)  [ ] Lupus anticoagulants                                     (3 Points)                                                           [ ] Anticardiolipin antibodies                               (3 Points)                                                       [ ] High homocysteine in the blood                      (3 Points)                                             [ ] Other congenital or acquired thrombophilia      (3 Points)                                                [ ] Heparin induced thrombocytopenia                  (3 Points)                                     Total Score [    9      ]

## 2023-11-13 NOTE — H&P PST ADULT - NSICDXPASTMEDICALHX_GEN_ALL_CORE_FT
PAST MEDICAL HISTORY:  2019 novel coronavirus disease (COVID-19) Dec 2020- hospitalized for 3 days, did not require home O2, denies residual symptoms    Depression     Essential hypertension     Fungal infection of skin under breast    GERD (gastroesophageal reflux disease)     H/O scoliosis     H/O seasonal allergies     History of pulmonary embolism developed after billings abigail surgery,1984 treated with coumadin 3 months, no issues after    OA (osteoarthritis)     Osteoporosis     Restless leg syndrome     Right hip pain      PAST MEDICAL HISTORY:  2019 novel coronavirus disease (COVID-19) Dec 2020- hospitalized for 3 days, did not require home O2, denies residual symptoms    Depression     Essential hypertension     Fungal infection of skin under breast    GERD (gastroesophageal reflux disease)     H/O scoliosis     H/O seasonal allergies     Hiatal hernia     History of chronic pain     History of pulmonary embolism developed after billings abigail surgery,1984 treated with coumadin 3 months, no issues after    OA (osteoarthritis)     Osteoporosis     Peripheral neuropathy     Restless leg syndrome     Right hip pain

## 2023-11-13 NOTE — H&P PST ADULT - PROBLEM SELECTOR PLAN 6
Pt. called NP to update med rec to include omeprazole 20 mg twice daily. Pt. instructed to take omeprezole with minimal water morning of procedure

## 2023-11-14 LAB
MRSA PCR RESULT.: SIGNIFICANT CHANGE UP
MRSA PCR RESULT.: SIGNIFICANT CHANGE UP
S AUREUS DNA NOSE QL NAA+PROBE: DETECTED
S AUREUS DNA NOSE QL NAA+PROBE: DETECTED

## 2023-11-20 ENCOUNTER — NON-APPOINTMENT (OUTPATIENT)
Age: 73
End: 2023-11-20

## 2023-11-20 VITALS — HEART RATE: 74 BPM | RESPIRATION RATE: 14 BRPM | SYSTOLIC BLOOD PRESSURE: 120 MMHG | DIASTOLIC BLOOD PRESSURE: 60 MMHG

## 2023-11-20 PROBLEM — G62.9 POLYNEUROPATHY, UNSPECIFIED: Chronic | Status: ACTIVE | Noted: 2023-11-13

## 2023-11-20 PROBLEM — Z87.898 PERSONAL HISTORY OF OTHER SPECIFIED CONDITIONS: Chronic | Status: ACTIVE | Noted: 2023-11-13

## 2023-11-20 PROBLEM — K44.9 DIAPHRAGMATIC HERNIA WITHOUT OBSTRUCTION OR GANGRENE: Chronic | Status: ACTIVE | Noted: 2023-11-13

## 2023-11-20 RX ORDER — SODIUM PICOSULFATE, MAGNESIUM OXIDE, AND ANHYDROUS CITRIC ACID 10; 3.5; 12 MG/160ML; G/160ML; G/160ML
10-3.5-12 MG-GM LIQUID ORAL
Qty: 1 | Refills: 0 | Status: DISCONTINUED | COMMUNITY
Start: 2023-10-20 | End: 2023-11-20

## 2023-11-22 ENCOUNTER — APPOINTMENT (OUTPATIENT)
Dept: CARDIOLOGY | Facility: CLINIC | Age: 73
End: 2023-11-22
Payer: MEDICARE

## 2023-11-22 ENCOUNTER — NON-APPOINTMENT (OUTPATIENT)
Age: 73
End: 2023-11-22

## 2023-11-22 VITALS
WEIGHT: 180 LBS | OXYGEN SATURATION: 95 % | SYSTOLIC BLOOD PRESSURE: 142 MMHG | HEART RATE: 81 BPM | DIASTOLIC BLOOD PRESSURE: 80 MMHG | BODY MASS INDEX: 32.92 KG/M2

## 2023-11-22 PROCEDURE — 93000 ELECTROCARDIOGRAM COMPLETE: CPT

## 2023-11-22 PROCEDURE — 99205 OFFICE O/P NEW HI 60 MIN: CPT

## 2023-11-27 ENCOUNTER — APPOINTMENT (OUTPATIENT)
Dept: CARDIOLOGY | Facility: CLINIC | Age: 73
End: 2023-11-27
Payer: MEDICARE

## 2023-11-27 DIAGNOSIS — K21.9 GASTRO-ESOPHAGEAL REFLUX DISEASE WITHOUT ESOPHAGITIS: ICD-10-CM

## 2023-11-27 DIAGNOSIS — F12.90 CANNABIS USE, UNSPECIFIED, UNCOMPLICATED: ICD-10-CM

## 2023-11-27 PROCEDURE — 93306 TTE W/DOPPLER COMPLETE: CPT

## 2023-11-29 ENCOUNTER — TRANSCRIPTION ENCOUNTER (OUTPATIENT)
Age: 73
End: 2023-11-29

## 2023-11-29 RX ORDER — PRAMIPEXOLE DIHYDROCHLORIDE 3.75 MG/1
3.75 TABLET, EXTENDED RELEASE ORAL
Qty: 90 | Refills: 3 | Status: ACTIVE | COMMUNITY
Start: 2023-08-28 | End: 1900-01-01

## 2023-11-30 ENCOUNTER — TRANSCRIPTION ENCOUNTER (OUTPATIENT)
Age: 73
End: 2023-11-30

## 2023-11-30 ENCOUNTER — OUTPATIENT (OUTPATIENT)
Dept: INPATIENT UNIT | Facility: HOSPITAL | Age: 73
LOS: 1 days | End: 2023-11-30
Payer: MEDICARE

## 2023-11-30 ENCOUNTER — APPOINTMENT (OUTPATIENT)
Dept: SURGERY | Facility: HOSPITAL | Age: 73
End: 2023-11-30
Payer: MEDICARE

## 2023-11-30 VITALS
WEIGHT: 175.93 LBS | OXYGEN SATURATION: 96 % | TEMPERATURE: 98 F | HEART RATE: 84 BPM | SYSTOLIC BLOOD PRESSURE: 159 MMHG | DIASTOLIC BLOOD PRESSURE: 79 MMHG | RESPIRATION RATE: 16 BRPM | HEIGHT: 62 IN

## 2023-11-30 DIAGNOSIS — Z01.818 ENCOUNTER FOR OTHER PREPROCEDURAL EXAMINATION: ICD-10-CM

## 2023-11-30 DIAGNOSIS — Z98.890 OTHER SPECIFIED POSTPROCEDURAL STATES: Chronic | ICD-10-CM

## 2023-11-30 DIAGNOSIS — K44.9 DIAPHRAGMATIC HERNIA WITHOUT OBSTRUCTION OR GANGRENE: ICD-10-CM

## 2023-11-30 DIAGNOSIS — Z96.643 PRESENCE OF ARTIFICIAL HIP JOINT, BILATERAL: Chronic | ICD-10-CM

## 2023-11-30 DIAGNOSIS — Z98.49 CATARACT EXTRACTION STATUS, UNSPECIFIED EYE: Chronic | ICD-10-CM

## 2023-11-30 PROCEDURE — 43281 LAP PARAESOPHAG HERN REPAIR: CPT | Mod: 82

## 2023-11-30 PROCEDURE — 71045 X-RAY EXAM CHEST 1 VIEW: CPT | Mod: 26

## 2023-11-30 PROCEDURE — 43281 LAP PARAESOPHAG HERN REPAIR: CPT

## 2023-11-30 RX ORDER — DULOXETINE HYDROCHLORIDE 30 MG/1
20 CAPSULE, DELAYED RELEASE ORAL DAILY
Refills: 0 | Status: DISCONTINUED | OUTPATIENT
Start: 2023-11-30 | End: 2023-12-01

## 2023-11-30 RX ORDER — HYDROMORPHONE HYDROCHLORIDE 2 MG/ML
0.25 INJECTION INTRAMUSCULAR; INTRAVENOUS; SUBCUTANEOUS
Refills: 0 | Status: DISCONTINUED | OUTPATIENT
Start: 2023-11-30 | End: 2023-11-30

## 2023-11-30 RX ORDER — HYDROMORPHONE HYDROCHLORIDE 2 MG/ML
0.5 INJECTION INTRAMUSCULAR; INTRAVENOUS; SUBCUTANEOUS
Refills: 0 | Status: DISCONTINUED | OUTPATIENT
Start: 2023-11-30 | End: 2023-11-30

## 2023-11-30 RX ORDER — SODIUM CHLORIDE 9 MG/ML
1000 INJECTION, SOLUTION INTRAVENOUS
Refills: 0 | Status: DISCONTINUED | OUTPATIENT
Start: 2023-11-30 | End: 2023-12-01

## 2023-11-30 RX ORDER — ATORVASTATIN CALCIUM 80 MG/1
10 TABLET, FILM COATED ORAL AT BEDTIME
Refills: 0 | Status: DISCONTINUED | OUTPATIENT
Start: 2023-11-30 | End: 2023-12-01

## 2023-11-30 RX ORDER — PRAMIPEXOLE DIHYDROCHLORIDE 0.12 MG/1
1.5 TABLET ORAL DAILY
Refills: 0 | Status: DISCONTINUED | OUTPATIENT
Start: 2023-11-30 | End: 2023-12-01

## 2023-11-30 RX ORDER — HEPARIN SODIUM 5000 [USP'U]/ML
5000 INJECTION INTRAVENOUS; SUBCUTANEOUS EVERY 8 HOURS
Refills: 0 | Status: DISCONTINUED | OUTPATIENT
Start: 2023-11-30 | End: 2023-12-01

## 2023-11-30 RX ORDER — ONDANSETRON 8 MG/1
4 TABLET, FILM COATED ORAL ONCE
Refills: 0 | Status: DISCONTINUED | OUTPATIENT
Start: 2023-11-30 | End: 2023-11-30

## 2023-11-30 RX ORDER — ACETAMINOPHEN 500 MG
650 TABLET ORAL EVERY 6 HOURS
Refills: 0 | Status: DISCONTINUED | OUTPATIENT
Start: 2023-11-30 | End: 2023-12-01

## 2023-11-30 RX ORDER — ONDANSETRON 8 MG/1
4 TABLET, FILM COATED ORAL ONCE
Refills: 0 | Status: DISCONTINUED | OUTPATIENT
Start: 2023-11-30 | End: 2023-12-01

## 2023-11-30 RX ORDER — PANTOPRAZOLE SODIUM 20 MG/1
40 TABLET, DELAYED RELEASE ORAL DAILY
Refills: 0 | Status: DISCONTINUED | OUTPATIENT
Start: 2023-11-30 | End: 2023-12-01

## 2023-11-30 RX ORDER — KETOROLAC TROMETHAMINE 30 MG/ML
15 SYRINGE (ML) INJECTION EVERY 6 HOURS
Refills: 0 | Status: DISCONTINUED | OUTPATIENT
Start: 2023-11-30 | End: 2023-12-01

## 2023-11-30 RX ORDER — LOSARTAN POTASSIUM 100 MG/1
100 TABLET, FILM COATED ORAL DAILY
Refills: 0 | Status: DISCONTINUED | OUTPATIENT
Start: 2023-11-30 | End: 2023-12-01

## 2023-11-30 RX ADMIN — Medication 15 MILLIGRAM(S): at 20:02

## 2023-11-30 RX ADMIN — SODIUM CHLORIDE 100 MILLILITER(S): 9 INJECTION, SOLUTION INTRAVENOUS at 14:55

## 2023-11-30 RX ADMIN — PRAMIPEXOLE DIHYDROCHLORIDE 1.5 MILLIGRAM(S): 0.12 TABLET ORAL at 23:59

## 2023-11-30 RX ADMIN — PANTOPRAZOLE SODIUM 40 MILLIGRAM(S): 20 TABLET, DELAYED RELEASE ORAL at 19:31

## 2023-11-30 RX ADMIN — HEPARIN SODIUM 5000 UNIT(S): 5000 INJECTION INTRAVENOUS; SUBCUTANEOUS at 19:19

## 2023-11-30 RX ADMIN — ATORVASTATIN CALCIUM 10 MILLIGRAM(S): 80 TABLET, FILM COATED ORAL at 21:12

## 2023-11-30 RX ADMIN — Medication 15 MILLIGRAM(S): at 19:19

## 2023-11-30 RX ADMIN — Medication 650 MILLIGRAM(S): at 21:49

## 2023-11-30 NOTE — BRIEF OPERATIVE NOTE - OPERATION/FINDINGS
Small posterior recurrence of previous hiatal hernia. Previous hiatal hernia with posterior toupet fundopilcation noted. Uncomplicated Laparoscopic Hiatal hernia repair

## 2023-11-30 NOTE — CHART NOTE - NSCHARTNOTEFT_GEN_A_CORE
POST-OP NOTE    VIRGINIA HUFFMAN | [id number] | [facility] PACU 17    Procedure: s/p laparoscopic hiatal hernia repair     Subjective: Patient seen and examined approximately 4 hours after surgery. Reports that she is feeling well and her pain is controlled. She does not want to take Dilaudid because she thinks it will make her sick. She has not ambulated or voided. No BM or flatus yet. No nausea or vomiting.     Vital Signs Last 24 Hrs  T(C): 36.4 (30 Nov 2023 16:00), Max: 36.8 (30 Nov 2023 08:25)  T(F): 97.5 (30 Nov 2023 16:00), Max: 98.2 (30 Nov 2023 08:25)  HR: 83 (30 Nov 2023 16:00) (83 - 89)  BP: 142/64 (30 Nov 2023 16:00) (108/57 - 162/74)  BP(mean): 91 (30 Nov 2023 16:00) (78 - 107)  RR: 16 (30 Nov 2023 16:00) (16 - 17)  SpO2: 97% (30 Nov 2023 16:00) (94% - 100%)    Parameters below as of 30 Nov 2023 16:00  Patient On (Oxygen Delivery Method): room air    PHYSICAL EXAM:  Gen: NAD  Resp: breathing easily, no stridor  CV: RRR  Abdomen: soft, nontender, nondistended. Dressings are c/d/i.     Assessment: 74 yo F s/p laparoscopic hiatal hernia repair     Plan:  -NPO except sips and chips  -f/u PACU CXR final read  -UGI in morning  -d/c tomorrow on puree diet POST-OP NOTE    VIRGINIA HUFFMAN | [id number] | [facility] PACU 17    Procedure: s/p laparoscopic hiatal hernia repair     Subjective: Patient seen and examined approximately 4 hours after surgery. Reports that she is feeling well and her pain is controlled. She does not want to take Dilaudid because she thinks it will make her sick. She has not ambulated or voided. No BM or flatus yet. No nausea or vomiting.     Vital Signs Last 24 Hrs  T(C): 36.4 (30 Nov 2023 16:00), Max: 36.8 (30 Nov 2023 08:25)  T(F): 97.5 (30 Nov 2023 16:00), Max: 98.2 (30 Nov 2023 08:25)  HR: 83 (30 Nov 2023 16:00) (83 - 89)  BP: 142/64 (30 Nov 2023 16:00) (108/57 - 162/74)  BP(mean): 91 (30 Nov 2023 16:00) (78 - 107)  RR: 16 (30 Nov 2023 16:00) (16 - 17)  SpO2: 97% (30 Nov 2023 16:00) (94% - 100%)    Parameters below as of 30 Nov 2023 16:00  Patient On (Oxygen Delivery Method): room air    PHYSICAL EXAM:  Gen: NAD  Resp: breathing easily, no stridor  CV: RRR  Abdomen: soft, nontender, nondistended. Dressings are c/d/i.     Assessment: 74 yo F s/p laparoscopic hiatal hernia repair     Plan:  -NPO except sips and chips  -f/u PACU CXR final read  -UGI in morning  -d/c tomorrow on puree diet POST-OP NOTE    VIRGINIA HUFFMAN | [id number] | [facility] PACU 17    Procedure: s/p laparoscopic hiatal hernia repair     Subjective: Patient seen and examined approximately 4 hours after surgery. Reports that she is feeling well and her pain is controlled. She does not want to take Dilaudid because she thinks it will make her sick. She has not ambulated or voided. No BM or flatus yet. No nausea or vomiting.     Vital Signs Last 24 Hrs  T(C): 36.4 (30 Nov 2023 16:00), Max: 36.8 (30 Nov 2023 08:25)  T(F): 97.5 (30 Nov 2023 16:00), Max: 98.2 (30 Nov 2023 08:25)  HR: 83 (30 Nov 2023 16:00) (83 - 89)  BP: 142/64 (30 Nov 2023 16:00) (108/57 - 162/74)  BP(mean): 91 (30 Nov 2023 16:00) (78 - 107)  RR: 16 (30 Nov 2023 16:00) (16 - 17)  SpO2: 97% (30 Nov 2023 16:00) (94% - 100%)    Parameters below as of 30 Nov 2023 16:00  Patient On (Oxygen Delivery Method): room air    PHYSICAL EXAM:  Gen: NAD  Resp: breathing easily, no stridor  CV: RRR  Abdomen: soft, nontender, nondistended. Dressings are c/d/i.     Assessment: 74 yo F s/p laparoscopic hiatal hernia repair     Plan:  -NPO except sips and chips  -f/u PACU CXR final read  -UGI in morning  -d/c tomorrow on puree diet

## 2023-11-30 NOTE — DISCHARGE NOTE NURSING/CASE MANAGEMENT/SOCIAL WORK - PATIENT PORTAL LINK FT
You can access the FollowMyHealth Patient Portal offered by [facility] by registering at the following website: [email or web address]. By joining [employer]’s FollowMyHealth portal, you will also be able to view your health information using other applications (apps) compatible with our system. You can access the FollowMyHealth Patient Portal offered by [facility] by registering at the following website: [email or web address]. By joining [employer]’s FollowMyHealth portal, you will also be able to view your health information using other applications (apps) compatible with our system. You can access the FollowMyHealth Patient Portal offered by [facility] by registering at the following website: [email or web address]. By joining [employer]’s FollowMyHealth portal, you will also be able to view your health information using other applications (apps) compatible with our system.

## 2023-11-30 NOTE — BRIEF OPERATIVE NOTE - NSICDXBRIEFPOSTOP_GEN_ALL_CORE_FT
POST-OP DIAGNOSIS:  Hiatal hernia 30-Nov-2023 13:05:48  Sukumar Ortega   POST-OP DIAGNOSIS:  Hiatal hernia 30-Nov-2023 13:05:48  Sukumra Ortega

## 2023-11-30 NOTE — BRIEF OPERATIVE NOTE - NSICDXBRIEFPREOP_GEN_ALL_CORE_FT
PRE-OP DIAGNOSIS:  Hiatal hernia 30-Nov-2023 13:05:41  Sukumar Ortega   PRE-OP DIAGNOSIS:  Hiatal hernia 30-Nov-2023 13:05:41  Sukumar Ortgea

## 2023-11-30 NOTE — PRE-ANESTHESIA EVALUATION ADULT - NSANTHPEFT_GEN_ALL_CORE
PHYSICAL EXAM:  GENERAL: NAD  CHEST/LUNG: Breathing comfortably on room air  HEART: Regular rate and rhythm

## 2023-11-30 NOTE — PRE-ANESTHESIA EVALUATION ADULT - NSANTHPMHFT_GEN_ALL_CORE
74 y/o F with pmhx HTN, GERD, restless leg syndrome presenting for laparoscopic recurrent hiatal hernia repair.     Endorsing chronic cough. Denies cp, sob. 72 y/o F with pmhx HTN, GERD, restless leg syndrome presenting for laparoscopic recurrent hiatal hernia repair.     Endorsing chronic cough. Denies cp, sob. 74 y/o F with pmhx HTN, GERD, restless leg syndrome presenting for laparoscopic recurrent hiatal hernia repair.     Endorsing chronic cough from GERD. Denies cp, sob. 72 y/o F with pmhx HTN, GERD, restless leg syndrome presenting for laparoscopic recurrent hiatal hernia repair.     Endorsing chronic cough from GERD. Denies cp, sob.

## 2023-11-30 NOTE — PATIENT PROFILE ADULT - FALL HARM RISK - HARM RISK INTERVENTIONS
Assistance with ambulation/Assistance OOB with selected safe patient handling equipment/Communicate Risk of Fall with Harm to all staff/Discuss with provider need for PT consult/Monitor gait and stability/Provide patient with walking aids - walker, cane, crutches/Reinforce activity limits and safety measures with patient and family/Tailored Fall Risk Interventions/Visual Cue: Yellow wristband and red socks/Bed in lowest position, wheels locked, appropriate side rails in place/Call bell, personal items and telephone in reach/Instruct patient to call for assistance before getting out of bed or chair/Non-slip footwear when patient is out of bed/[place] to call system/Physically safe environment - no spills, clutter or unnecessary equipment/Purposeful Proactive Rounding/Room/bathroom lighting operational, light cord in reach Assistance with ambulation/Assistance OOB with selected safe patient handling equipment/Communicate Risk of Fall with Harm to all staff/Discuss with provider need for PT consult/Monitor gait and stability/Provide patient with walking aids - walker, cane, crutches/Reinforce activity limits and safety measures with patient and family/Tailored Fall Risk Interventions/Visual Cue: Yellow wristband and red socks/Bed in lowest position, wheels locked, appropriate side rails in place/Call bell, personal items and telephone in reach/Instruct patient to call for assistance before getting out of bed or chair/Non-slip footwear when patient is out of bed/[place] to call system/Physically safe environment - no spills, clutter or unnecessary equipment/Purposeful Proactive Rounding/Room/bathroom lighting operational, light cord in reach Assistance with ambulation/Assistance OOB with selected safe patient handling equipment/Communicate Risk of Fall with Harm to all staff/Discuss with provider need for PT consult/Monitor gait and stability/Provide patient with walking aids - walker, cane, crutches/Reinforce activity limits and safety measures with patient and family/Tailored Fall Risk Interventions/Visual Cue: Yellow wristband and red socks/Bed in lowest position, wheels locked, appropriate side rails in place/Call bell, personal items and telephone in reach/Instruct patient to call for assistance before getting out of bed or chair/Non-slip footwear when patient is out of bed/[place] to call system/Physically safe environment - no spills, clutter or unnecessary equipment/Purposeful Proactive Rounding/Room/bathroom lighting operational, light cord in reach

## 2023-11-30 NOTE — PATIENT PROFILE ADULT - STATED REASON FOR ADMISSION
Zygomaticofacial Flap Text: Given the location of the defect, shape of the defect and the proximity to free margins a zygomaticofacial flap was deemed most appropriate for repair.  Using a sterile surgical marker, the appropriate flap was drawn incorporating the defect and placing the expected incisions within the relaxed skin tension lines where possible. The area thus outlined was incised deep to adipose tissue with a #15 scalpel blade with preservation of a vascular pedicle.  The skin margins were undermined to an appropriate distance in all directions utilizing iris scissors.  The flap was then placed into the defect and anchored with interrupted buried subcutaneous sutures. hernia reopair hernia repair

## 2023-11-30 NOTE — DISCHARGE NOTE NURSING/CASE MANAGEMENT/SOCIAL WORK - NSDCPEFALRISK_GEN_ALL_CORE
For information on Fall & Injury Prevention, visit: [email or web address] OR  [email or web address] OR  https://www.cdc.gov/steadi/patient.html For information on Fall & Injury Prevention, visit: [email or web address]://[email or web address]/[email or web address]-[email or web address]-mobility OR  https://[email or web address].[email or web address]/[email or web address]-[email or web address]-[email or web address]-avoid-injury OR  https://www.cdc.gov/steadi/patient.html For information on Fall & Injury Prevention, visit: [email or web address]://[email or web address]-[email or web address]-[email or web address]-mobility OR  https://[email or web address]/news/fall-prevention-tips-to-avoid-injury OR  https://www.cdc.gov/steadi/patient.html

## 2023-12-01 ENCOUNTER — TRANSCRIPTION ENCOUNTER (OUTPATIENT)
Age: 73
End: 2023-12-01

## 2023-12-01 VITALS — WEIGHT: 175.93 LBS

## 2023-12-01 LAB
ANION GAP SERPL CALC-SCNC: 12 MMOL/L — SIGNIFICANT CHANGE UP (ref 5–17)
ANION GAP SERPL CALC-SCNC: 12 MMOL/L — SIGNIFICANT CHANGE UP (ref 5–17)
BUN SERPL-MCNC: 22 MG/DL — SIGNIFICANT CHANGE UP (ref 7–23)
BUN SERPL-MCNC: 22 MG/DL — SIGNIFICANT CHANGE UP (ref 7–23)
CALCIUM SERPL-MCNC: 8.9 MG/DL — SIGNIFICANT CHANGE UP (ref 8.4–10.5)
CALCIUM SERPL-MCNC: 8.9 MG/DL — SIGNIFICANT CHANGE UP (ref 8.4–10.5)
CHLORIDE SERPL-SCNC: 105 MMOL/L — SIGNIFICANT CHANGE UP (ref 96–108)
CHLORIDE SERPL-SCNC: 105 MMOL/L — SIGNIFICANT CHANGE UP (ref 96–108)
CO2 SERPL-SCNC: 25 MMOL/L — SIGNIFICANT CHANGE UP (ref 22–31)
CO2 SERPL-SCNC: 25 MMOL/L — SIGNIFICANT CHANGE UP (ref 22–31)
CREAT SERPL-MCNC: 0.79 MG/DL — SIGNIFICANT CHANGE UP (ref 0.5–1.3)
CREAT SERPL-MCNC: 0.79 MG/DL — SIGNIFICANT CHANGE UP (ref 0.5–1.3)
EGFR: 78 ML/MIN/1.73M2 — SIGNIFICANT CHANGE UP
EGFR: 78 ML/MIN/1.73M2 — SIGNIFICANT CHANGE UP
GLUCOSE SERPL-MCNC: 103 MG/DL — HIGH (ref 70–99)
GLUCOSE SERPL-MCNC: 103 MG/DL — HIGH (ref 70–99)
HCT VFR BLD CALC: 32.7 % — LOW (ref 34.5–45)
HCT VFR BLD CALC: 32.7 % — LOW (ref 34.5–45)
HGB BLD-MCNC: 10.6 G/DL — LOW (ref 11.5–15.5)
HGB BLD-MCNC: 10.6 G/DL — LOW (ref 11.5–15.5)
MAGNESIUM SERPL-MCNC: 2.1 MG/DL — SIGNIFICANT CHANGE UP (ref 1.6–2.6)
MAGNESIUM SERPL-MCNC: 2.1 MG/DL — SIGNIFICANT CHANGE UP (ref 1.6–2.6)
MCHC RBC-ENTMCNC: 28.8 PG — SIGNIFICANT CHANGE UP (ref 27–34)
MCHC RBC-ENTMCNC: 28.8 PG — SIGNIFICANT CHANGE UP (ref 27–34)
MCHC RBC-ENTMCNC: 32.4 GM/DL — SIGNIFICANT CHANGE UP (ref 32–36)
MCHC RBC-ENTMCNC: 32.4 GM/DL — SIGNIFICANT CHANGE UP (ref 32–36)
MCV RBC AUTO: 88.9 FL — SIGNIFICANT CHANGE UP (ref 80–100)
MCV RBC AUTO: 88.9 FL — SIGNIFICANT CHANGE UP (ref 80–100)
NRBC # BLD: 0 /100 WBCS — SIGNIFICANT CHANGE UP (ref 0–0)
NRBC # BLD: 0 /100 WBCS — SIGNIFICANT CHANGE UP (ref 0–0)
PHOSPHATE SERPL-MCNC: 2.8 MG/DL — SIGNIFICANT CHANGE UP (ref 2.5–4.5)
PHOSPHATE SERPL-MCNC: 2.8 MG/DL — SIGNIFICANT CHANGE UP (ref 2.5–4.5)
PLATELET # BLD AUTO: 234 K/UL — SIGNIFICANT CHANGE UP (ref 150–400)
PLATELET # BLD AUTO: 234 K/UL — SIGNIFICANT CHANGE UP (ref 150–400)
POTASSIUM SERPL-MCNC: 3.5 MMOL/L — SIGNIFICANT CHANGE UP (ref 3.5–5.3)
POTASSIUM SERPL-MCNC: 3.5 MMOL/L — SIGNIFICANT CHANGE UP (ref 3.5–5.3)
POTASSIUM SERPL-SCNC: 3.5 MMOL/L — SIGNIFICANT CHANGE UP (ref 3.5–5.3)
POTASSIUM SERPL-SCNC: 3.5 MMOL/L — SIGNIFICANT CHANGE UP (ref 3.5–5.3)
RBC # BLD: 3.68 M/UL — LOW (ref 3.8–5.2)
RBC # BLD: 3.68 M/UL — LOW (ref 3.8–5.2)
RBC # FLD: 14.8 % — HIGH (ref 10.3–14.5)
RBC # FLD: 14.8 % — HIGH (ref 10.3–14.5)
SODIUM SERPL-SCNC: 142 MMOL/L — SIGNIFICANT CHANGE UP (ref 135–145)
SODIUM SERPL-SCNC: 142 MMOL/L — SIGNIFICANT CHANGE UP (ref 135–145)
WBC # BLD: 7.25 K/UL — SIGNIFICANT CHANGE UP (ref 3.8–10.5)
WBC # BLD: 7.25 K/UL — SIGNIFICANT CHANGE UP (ref 3.8–10.5)
WBC # FLD AUTO: 7.25 K/UL — SIGNIFICANT CHANGE UP (ref 3.8–10.5)
WBC # FLD AUTO: 7.25 K/UL — SIGNIFICANT CHANGE UP (ref 3.8–10.5)

## 2023-12-01 PROCEDURE — 83735 ASSAY OF MAGNESIUM: CPT

## 2023-12-01 PROCEDURE — 85027 COMPLETE CBC AUTOMATED: CPT

## 2023-12-01 PROCEDURE — 74240 X-RAY XM UPR GI TRC 1CNTRST: CPT | Mod: 26

## 2023-12-01 PROCEDURE — C9399: CPT

## 2023-12-01 PROCEDURE — 80048 BASIC METABOLIC PNL TOTAL CA: CPT

## 2023-12-01 PROCEDURE — 74240 X-RAY XM UPR GI TRC 1CNTRST: CPT

## 2023-12-01 PROCEDURE — 97161 PT EVAL LOW COMPLEX 20 MIN: CPT

## 2023-12-01 PROCEDURE — 71045 X-RAY EXAM CHEST 1 VIEW: CPT

## 2023-12-01 PROCEDURE — 84100 ASSAY OF PHOSPHORUS: CPT

## 2023-12-01 PROCEDURE — 43281 LAP PARAESOPHAG HERN REPAIR: CPT

## 2023-12-01 RX ORDER — PRAMIPEXOLE DIHYDROCHLORIDE 0.12 MG/1
1.5 TABLET ORAL ONCE
Refills: 0 | Status: COMPLETED | OUTPATIENT
Start: 2023-12-01 | End: 2023-12-01

## 2023-12-01 RX ORDER — LANOLIN ALCOHOL/MO/W.PET/CERES
5 CREAM (GRAM) TOPICAL AT BEDTIME
Refills: 0 | Status: DISCONTINUED | OUTPATIENT
Start: 2023-12-01 | End: 2023-12-01

## 2023-12-01 RX ORDER — OMEPRAZOLE 10 MG/1
1 CAPSULE, DELAYED RELEASE ORAL
Qty: 0 | Refills: 0 | DISCHARGE

## 2023-12-01 RX ORDER — GLUCOSAMINE HCL/CHONDROITIN SU 500-400 MG
1 CAPSULE ORAL
Refills: 0 | DISCHARGE

## 2023-12-01 RX ORDER — ACETAMINOPHEN 500 MG
2 TABLET ORAL
Refills: 0 | DISCHARGE

## 2023-12-01 RX ORDER — ACETAMINOPHEN 500 MG
20.31 TABLET ORAL
Qty: 0 | Refills: 0 | DISCHARGE
Start: 2023-12-01

## 2023-12-01 RX ORDER — FAMOTIDINE 10 MG/ML
1 INJECTION INTRAVENOUS
Qty: 0 | Refills: 0 | DISCHARGE

## 2023-12-01 RX ORDER — CHLORTHALIDONE 50 MG
25 TABLET ORAL DAILY
Refills: 0 | Status: DISCONTINUED | OUTPATIENT
Start: 2023-12-01 | End: 2023-12-01

## 2023-12-01 RX ORDER — MONTELUKAST 4 MG/1
1 TABLET, CHEWABLE ORAL
Qty: 0 | Refills: 0 | DISCHARGE

## 2023-12-01 RX ADMIN — HEPARIN SODIUM 5000 UNIT(S): 5000 INJECTION INTRAVENOUS; SUBCUTANEOUS at 02:38

## 2023-12-01 RX ADMIN — HEPARIN SODIUM 5000 UNIT(S): 5000 INJECTION INTRAVENOUS; SUBCUTANEOUS at 09:15

## 2023-12-01 RX ADMIN — PANTOPRAZOLE SODIUM 40 MILLIGRAM(S): 20 TABLET, DELAYED RELEASE ORAL at 12:26

## 2023-12-01 RX ADMIN — LOSARTAN POTASSIUM 100 MILLIGRAM(S): 100 TABLET, FILM COATED ORAL at 05:51

## 2023-12-01 RX ADMIN — Medication 650 MILLIGRAM(S): at 05:51

## 2023-12-01 RX ADMIN — PRAMIPEXOLE DIHYDROCHLORIDE 1.5 MILLIGRAM(S): 0.12 TABLET ORAL at 03:54

## 2023-12-01 RX ADMIN — Medication 25 MILLIGRAM(S): at 05:51

## 2023-12-01 RX ADMIN — Medication 650 MILLIGRAM(S): at 12:26

## 2023-12-01 RX ADMIN — Medication 15 MILLIGRAM(S): at 03:03

## 2023-12-01 RX ADMIN — Medication 5 MILLIGRAM(S): at 02:38

## 2023-12-01 RX ADMIN — Medication 15 MILLIGRAM(S): at 09:14

## 2023-12-01 NOTE — DIETITIAN INITIAL EVALUATION ADULT - ENERGY INTAKE
Adequate (%) Pt s/p hernia repair, UGI this morning, now advanced to full liquid diet today. Plan for discharge on pureed diet x 2 weeks per day.

## 2023-12-01 NOTE — PROGRESS NOTE ADULT - SUBJECTIVE AND OBJECTIVE BOX
GREEN TEAM SURGERY DAILY PROGRESS NOTE    SUBJECTIVE: No acute events overnight. Patient seen and examined this am.     OBJECTIVE:  Vital Signs Last 24 Hrs  T(C): 36.8 (30 Nov 2023 23:43), Max: 37.4 (30 Nov 2023 22:43)  T(F): 98.2 (30 Nov 2023 23:43), Max: 99.3 (30 Nov 2023 22:43)  HR: 99 (30 Nov 2023 23:43) (83 - 108)  BP: 166/84 (30 Nov 2023 23:43) (108/57 - 166/84)  BP(mean): 103 (30 Nov 2023 19:00) (78 - 107)  RR: 18 (30 Nov 2023 23:43) (16 - 18)  SpO2: 98% (30 Nov 2023 23:43) (94% - 100%)    Parameters below as of 30 Nov 2023 23:43  Patient On (Oxygen Delivery Method): room air    Daily Height in cm: 157.48 (30 Nov 2023 09:15)      STANDING  acetaminophen   Oral Liquid .. 650 milliGRAM(s) Oral every 6 hours  atorvastatin 10 milliGRAM(s) Oral at bedtime  chlorthalidone 25 milliGRAM(s) Oral daily  DULoxetine 20 milliGRAM(s) Oral daily  heparin   Injectable 5000 Unit(s) SubCutaneous every 8 hours  lactated ringers. 1000 milliLiter(s) (100 mL/Hr) IV Continuous <Continuous>  losartan 100 milliGRAM(s) Oral daily  pantoprazole  Injectable 40 milliGRAM(s) IV Push daily    PRN  ketorolac   Injectable 15 milliGRAM(s) IV Push every 6 hours PRN Severe Pain (7 - 10)  melatonin Liquid 5 milliGRAM(s) Oral at bedtime PRN Insomnia  ondansetron Injectable 4 milliGRAM(s) IV Push once PRN Nausea and/or Vomiting  pramipexole 1.5 milliGRAM(s) Oral daily PRN restless legs      Labs:      Physical Exam:  General: NAD  Respiratory: respirations non labored  Abdominal: soft, nontender, nondistended. Dressings are c/d/i.   Extremities: FROM, warm  Neurological: A+Ox3    GREEN TEAM SURGERY DAILY PROGRESS NOTE    SUBJECTIVE: No acute events overnight. Patient seen and examined this am. abdominal pain well controlled, tolerating sips, no n/v/f/c. No retching or regurgitation this morning. complain of groin and shoulder pain. Feeling well otherwise     OBJECTIVE:  Vital Signs Last 24 Hrs  T(C): 36.8 (30 Nov 2023 23:43), Max: 37.4 (30 Nov 2023 22:43)  T(F): 98.2 (30 Nov 2023 23:43), Max: 99.3 (30 Nov 2023 22:43)  HR: 99 (30 Nov 2023 23:43) (83 - 108)  BP: 166/84 (30 Nov 2023 23:43) (108/57 - 166/84)  BP(mean): 103 (30 Nov 2023 19:00) (78 - 107)  RR: 18 (30 Nov 2023 23:43) (16 - 18)  SpO2: 98% (30 Nov 2023 23:43) (94% - 100%)    Parameters below as of 30 Nov 2023 23:43  Patient On (Oxygen Delivery Method): room air    Daily Height in cm: 157.48 (30 Nov 2023 09:15)      STANDING  acetaminophen   Oral Liquid .. 650 milliGRAM(s) Oral every 6 hours  atorvastatin 10 milliGRAM(s) Oral at bedtime  chlorthalidone 25 milliGRAM(s) Oral daily  DULoxetine 20 milliGRAM(s) Oral daily  heparin   Injectable 5000 Unit(s) SubCutaneous every 8 hours  lactated ringers. 1000 milliLiter(s) (100 mL/Hr) IV Continuous <Continuous>  losartan 100 milliGRAM(s) Oral daily  pantoprazole  Injectable 40 milliGRAM(s) IV Push daily    PRN  ketorolac   Injectable 15 milliGRAM(s) IV Push every 6 hours PRN Severe Pain (7 - 10)  melatonin Liquid 5 milliGRAM(s) Oral at bedtime PRN Insomnia  ondansetron Injectable 4 milliGRAM(s) IV Push once PRN Nausea and/or Vomiting  pramipexole 1.5 milliGRAM(s) Oral daily PRN restless legs      Labs:      Physical Exam:  General: NAD  Respiratory: respirations non labored  Abdominal: soft, nontender, nondistended. x5 Laparoscopic incision sites c/d/i  Extremities: FROM, warm  Neurological: A+Ox3

## 2023-12-01 NOTE — DIETITIAN INITIAL EVALUATION ADULT - PERTINENT LABORATORY DATA
The defibrillation pads were placed in the posterior/lateral position. 12-01    142  |  105  |  22  ----------------------------<  103<H>  3.5   |  25  |  0.79    Ca    8.9      01 Dec 2023 06:55  Phos  2.8     12-01  Mg     2.1     12-01    A1C with Estimated Average Glucose Result: 6.0 % (10-07-23 @ 07:19)

## 2023-12-01 NOTE — DISCHARGE NOTE PROVIDER - NSDCMRMEDTOKEN_GEN_ALL_CORE_FT
acetaminophen 500 mg oral tablet: 2 tab(s) orally 2 times a day  atorvastatin 10 mg oral tablet: 1 tab(s) orally once a day  chlorthalidone 25 mg oral tablet: 1 tab(s) orally once a day  Chondroitin-Glucosamine 200 mg-250 mg oral capsule: 1 cap(s) orally once a day  DULoxetine 20 mg oral delayed release capsule: 1 cap(s) orally once a day  famotidine 40 mg oral tablet: 1 tab(s) orally once a day (at bedtime)  losartan 100 mg oral tablet: 1 tab(s) orally once a day  naproxen 250 mg oral tablet: 1 tab(s) orally every 12 hours as needed for  moderate pain  omeprazole 20 mg oral delayed release tablet: 1 tab(s) orally 2 times a day  Outpatient Physical Therapy: 1-2 times a week x 2 weeks  pramipexole 3.75 mg oral tablet, extended release: 1 tab(s) orally once a day  Prolia 60 mg/mL subcutaneous solution: 60 milligram(s) subcutaneously every 6 months  senna leaf extract oral tablet: 2 tab(s) orally once a day (at bedtime)  Singulair 10 mg oral tablet: 1 tab(s) orally once a day   acetaminophen 160 mg/5 mL oral suspension: 20.31 milliliter(s) orally every 6 hours as needed for mild to moderate pain  atorvastatin 10 mg oral tablet: 1 tab(s) orally once a day  chlorthalidone 25 mg oral tablet: 1 tab(s) orally once a day  DULoxetine 20 mg oral delayed release capsule: 1 cap(s) orally once a day  losartan 100 mg oral tablet: 1 tab(s) orally once a day  omeprazole 20 mg oral delayed release tablet: 1 tab(s) orally 2 times a day open capsule and pour into applesauce  Outpatient Physical Therapy: 1-2 times a week x 2 weeks  pramipexole 3.75 mg oral tablet, extended release: 1 tab(s) orally once a day  Prolia 60 mg/mL subcutaneous solution: 60 milligram(s) subcutaneously every 6 months   acetaminophen 160 mg/5 mL oral suspension: 20.31 milliliter(s) orally every 6 hours as needed for mild to moderate pain  atorvastatin 10 mg oral tablet: 1 tab(s) orally once a day  chlorthalidone 25 mg oral tablet: 1 tab(s) orally once a day  DULoxetine 20 mg oral delayed release capsule: 1 cap(s) orally once a day  losartan 100 mg oral tablet: 1 tab(s) orally once a day  omeprazole 40 mg oral delayed release capsule: 1 cap(s) orally once a day open and please pour onto applesauce  Outpatient Physical Therapy: 1-2 times a week x 2 weeks  pramipexole 3.75 mg oral tablet, extended release: 1 tab(s) orally once a day  Prolia 60 mg/mL subcutaneous solution: 60 milligram(s) subcutaneously every 6 months

## 2023-12-01 NOTE — DIETITIAN INITIAL EVALUATION ADULT - PERTINENT MEDS FT
MEDICATIONS  (STANDING):  acetaminophen   Oral Liquid .. 650 milliGRAM(s) Oral every 6 hours  atorvastatin 10 milliGRAM(s) Oral at bedtime  chlorthalidone 25 milliGRAM(s) Oral daily  DULoxetine 20 milliGRAM(s) Oral daily  heparin   Injectable 5000 Unit(s) SubCutaneous every 8 hours  lactated ringers. 1000 milliLiter(s) (100 mL/Hr) IV Continuous <Continuous>  losartan 100 milliGRAM(s) Oral daily  pantoprazole  Injectable 40 milliGRAM(s) IV Push daily    MEDICATIONS  (PRN):  ketorolac   Injectable 15 milliGRAM(s) IV Push every 6 hours PRN Severe Pain (7 - 10)  melatonin Liquid 5 milliGRAM(s) Oral at bedtime PRN Insomnia  ondansetron Injectable 4 milliGRAM(s) IV Push once PRN Nausea and/or Vomiting  pramipexole 1.5 milliGRAM(s) Oral daily PRN restless legs

## 2023-12-01 NOTE — PHYSICAL THERAPY INITIAL EVALUATION ADULT - ADDITIONAL COMMENTS
Prior to admission pt reports being independent of all ADL's & functional mobility without AD. Pt resides in apt, +elevator Prior to admission pt reports being independent of all ADL's & functional mobility with rollator. Pt resides in apt, +elevator access. Pt owns Rollator, tub transfer bench and commode (does not use).

## 2023-12-01 NOTE — DIETITIAN INITIAL EVALUATION ADULT - ORAL INTAKE PTA/DIET HISTORY
Pt reports good PO intake and appetite PTA, consumes regular diet. NKFA. Pt denies chewing/swallowing difficulty, nausea, vomiting, diarrhea, constipation.

## 2023-12-01 NOTE — DISCHARGE NOTE PROVIDER - NSDCFUSCHEDAPPT_GEN_ALL_CORE_FT
Cristy Wayne  [facility]  Scheduled Appointment: 12/19/2023    Cristy Wayne  [facility]  UROGYN [address]  Scheduled Appointment: 12/19/2023    Rosario Gonzalez  [facility]  GASTRO [address] [facility]  Scheduled Appointment: 01/22/2024    Gisela Alvarez  [facility]  NEUROLOGY [address]  Scheduled Appointment: 01/29/2024     Cristy Wayne  [facility]  Scheduled Appointment: 12/19/2023    Cristy Wayne  [facility]  UROGYN [address]  Scheduled Appointment: 12/19/2023    Rosario Gonzalez  [facility]  GASTRO [address] [facility]  Scheduled Appointment: 01/22/2024    Gisela Alvarez  [facility]  NEUROLOGY [address]  Scheduled Appointment: 01/29/2024     [facility]  [facility]  Scheduled Appointment: 12/19/2023    Cristy Wayne  [facility]  UROGYN [address]  Scheduled Appointment: 12/19/2023    Rosario Gonzalez  [facility]  GASTRO [address] [facility]  Scheduled Appointment: 01/22/2024    Gisela Alvarez  [facility]  NEUROLOGY [address]  Scheduled Appointment: 01/29/2024

## 2023-12-01 NOTE — DIETITIAN INITIAL EVALUATION ADULT - ADD RECOMMEND
1) Defer diet texture/consistency to team. Plan for discharge home on pureed diet. 2) Diet education provided, reinforce as needed.

## 2023-12-01 NOTE — DIETITIAN INITIAL EVALUATION ADULT - EDUCATION DIETARY MODIFICATIONS
pureed diet, importance of protein-rich foods, nutrition shakes as needed/teach back/(2) meets goals/outcomes/verbalization

## 2023-12-01 NOTE — PROGRESS NOTE ADULT - ASSESSMENT
73 year old female with a history of hiatal hernia repair 20 years ago and reports experiencing a chronic cough and post prandial retching. Diagnostic imaging which confirmed recurrence of hiatal hernia. Now s/p laparoscopic hiatal hernia repair(11/30).    Plan:  - NPO except sips and chips  - LR 100mL/Hr  - UGI in morning  - d/c today on puree diet  - DVT ppx: [facility]  [id number] 73 year old female with a history of hiatal hernia repair 20 years ago and reports experiencing a chronic cough and post prandial retching. Diagnostic imaging which confirmed recurrence of hiatal hernia. Now s/p laparoscopic hiatal hernia repair(11/30).    Plan:  - NPO except sips and chips  - LR 100mL/Hr  - UGI in morning  - d/c today on puree diet  - DVT ppx: [facility]  [id number] 73 year old female with a history of hiatal hernia repair 20 years ago and reports experiencing a chronic cough and post prandial retching. Diagnostic imaging which confirmed recurrence of hiatal hernia. Now s/p laparoscopic hiatal hernia repair(11/30).    Plan:  - NPO except sips and chips  - LR 100mL/Hr  - UGI in morning  - d/c today on puree diet  - DVT ppx: [facility]  [id number] 73 year old female with a history of hiatal hernia repair 20 years ago and reports experiencing a chronic cough and post prandial retching. Diagnostic imaging which confirmed recurrence of hiatal hernia. Now s/p laparoscopic hiatal hernia repair(11/30).    Plan:  - NPO except sips and chips  - LR 100mL/Hr  - UGI in morning  - will start FLD if UGI shows no dysphagia, leak, or obstruction  - will d/c today on puree diet for 2 weeks  - DVT ppx: [facility]      Sukumar Ortega PGY6  MIS/Bariatric Fellow  [facility]  [id number] 73 year old female with a history of hiatal hernia repair 20 years ago and reports experiencing a chronic cough and post prandial retching. Diagnostic imaging which confirmed recurrence of hiatal hernia. Now s/p laparoscopic hiatal hernia repair(11/30).    Plan:  - NPO except sips and chips  - LR 100mL/Hr  - UGI in morning  - will start FLD if UGI shows no dysphagia, leak, or obstruction  - will d/c today on puree diet for 2 weeks  - DVT ppx: [facility]      Sukumar Ortega PGY6  MIS/Bariatric Fellow  [facility]  [id number] 73 year old female with a history of hiatal hernia repair 20 years ago and reports experiencing a chronic cough and post prandial retching. Diagnostic imaging which confirmed recurrence of hiatal hernia. Now s/p laparoscopic hiatal hernia repair(11/30).    Plan:  - NPO except sips and chips  - LR 100mL/Hr  - UGI in morning  - will start FLD if UGI shows no dysphagia, leak, or obstruction  - will d/c today on puree diet for 2 weeks  - DVT ppx: [facility]      Sukumar Ortega PGY6  MIS/Bariatric Fellow  [facility]  [id number]

## 2023-12-01 NOTE — DISCHARGE NOTE PROVIDER - HOSPITAL COURSE
HPI:  73 year old female presents for laparoscopic recurrent hiatal hernia repair. Pt. with a history of hiatal hernia repair 20 years ago and reports experiencing a chronic cough and post prandial retching. s/p diagnostic imaging which confirmed recurrence of hiatal hernia.    Pt. reports having COVID-19 in 12/20 and pneumonia, managed outpatient. (13 Nov 2023 12:05)      Pt was admitted under Surgery for further evaluation and management. Pt was taken to the OR on //23, and is s/p. The patient tolerated the procedure well (see operative report for full details). Pt was transferred to the PACU in stable condition. In the PACU, the patient's pain was controlled and vitals stable. The patient was given clear liquids with Ensure Clears in PACU, and encouraged with early ambulation. On POC, the patient was doing well. The patient was transferred to the surgical floor in stable condition. ERP protocol was followed.   On POD #1, pt was stable and doing well. Pt's Blake was discontinued on , and passed TOV. Once IV pain control dosing complete, pt was transitioned to oral Tylenol and Motrin with Oxycodone for breakthrough pain. Diet was advanced as tolerated, and GI function returned.   Caprini score is***** ; pt will be prescribed with Eliquis as DVT prophylaxis for 30 days.  Pt to be discharged with ostomy/RADHA drain; ostomy/RADHA drain teaching done.  Physical therapy evaluated the patient and recommended ****  On the day of discharge, the patient's vitals are stable, pain is controlled, voiding urine, passing gas/stool, tolerating a low fiber diet, and ambulating well. Pt will f/u with ****  in 1-2 weeks. Pt will f/u with PCP in 1-2 weeks. HPI: 73 year old female presents for laparoscopic recurrent hiatal hernia repair. Pt. with a history of hiatal hernia repair 20 years ago and reports experiencing a chronic cough and post prandial retching. s/p diagnostic imaging which confirmed recurrence of hiatal hernia. Pt. reports having COVID-19 in 12/20 and pneumonia, managed outpatient. (13 Nov 2023 12:05)    Pt was admitted under [facility] for further evaluation and management. Pt was taken to the OR on 11/30/23, and is s/p laparoscopic herniorrhaphy of hiatal hernia. The patient tolerated the procedure well (see operative report for full details). Pt was transferred to the PACU in stable condition. In the PACU, the patient's pain was controlled and vitals stable. On POC, the patient was doing well. The patient was transferred to the surgical floor in stable condition.     On POD #1, pt was stable and doing well. Pt's Blake was discontinued, and passed TOV. Once IV pain control dosing complete, pt was transitioned to oral Tylenol and Motrin. UGI was performed and showed****.     Diet was advanced as tolerated to full liquid diet. Patient will go home on puree diet for 2 weeks and followup outpatient.    Physical therapy evaluated the patient and recommended outpatient PT. Script was provided.    On the day of discharge, the patient's vitals are stable, pain is controlled, voiding urine, passing gas/stool, tolerating a full liquid diet, and ambulating well. Pt will f/u with Dr. Galaviz in 1-2 weeks. Pt will f/u with PCP in 1-2 weeks. HPI: 73 year old female presents for laparoscopic recurrent hiatal hernia repair. Pt. with a history of hiatal hernia repair 20 years ago and reports experiencing a chronic cough and post prandial retching. s/p diagnostic imaging which confirmed recurrence of hiatal hernia. Pt. reports having COVID-19 in 12/20 and pneumonia, managed outpatient. (13 Nov 2023 12:05)    Pt was admitted under [facility] for further evaluation and management. Pt was taken to the OR on 11/30/23, and is s/p laparoscopic herniorrhaphy of hiatal hernia. The patient tolerated the procedure well (see operative report for full details). Pt was transferred to the PACU in stable condition. In the PACU, the patient's pain was controlled and vitals stable. On POC, the patient was doing well. The patient was transferred to the surgical floor in stable condition.     On POD #1, pt was stable and doing well. Pt's Blake was discontinued, and passed TOV. Once IV pain control dosing complete, pt was transitioned to oral Tylenol and Motrin. UGI was performed and showed****.     Diet was advanced as tolerated to full liquid diet. Patient will go home on puree diet for 2 weeks and followup outpatient.    Physical therapy evaluated the patient and recommended outpatient PT. Script was provided.    On the day of discharge, the patient's vitals are stable, pain is controlled, voiding urine, passing gas/stool, tolerating a full liquid diet, and ambulating well. Pt will f/u with Dr. Galaviz in 1-2 weeks. Pt will f/u with PCP in 1-2 weeks. HPI: 73 year old female presents for laparoscopic recurrent hiatal hernia repair. Pt. with a history of hiatal hernia repair 20 years ago and reports experiencing a chronic cough and post prandial retching. s/p diagnostic imaging which confirmed recurrence of hiatal hernia. Pt. reports having COVID-19 in 12/20 and pneumonia, managed outpatient. (13 Nov 2023 12:05)    Pt was admitted under [facility] for further evaluation and management. Pt was taken to the OR on 11/30/23, and is s/p laparoscopic herniorrhaphy of hiatal hernia. The patient tolerated the procedure well (see operative report for full details). Pt was transferred to the PACU in stable condition. In the PACU, the patient's pain was controlled and vitals stable. On POC, the patient was doing well. The patient was transferred to the surgical floor in stable condition.     On POD #1, pt was stable and doing well. Pt's Blake was discontinued, and passed TOV. Once IV pain control dosing complete, pt was transitioned to oral Tylenol and Motrin. UGI was performed and showed****.     Diet was advanced as tolerated to full liquid diet. Patient will go home on puree diet for 2 weeks and followup outpatient.    Physical therapy evaluated the patient and recommended outpatient PT. Script was provided.    On the day of discharge, the patient's vitals are stable, pain is controlled, voiding urine, passing gas/stool, tolerating a full liquid diet, and ambulating well. Pt will f/u with Dr. Galaviz in 1-2 weeks. Pt will f/u with PCP in 1-2 weeks. HPI: 73 year old female presents for laparoscopic recurrent hiatal hernia repair. Pt. with a history of hiatal hernia repair 20 years ago and reports experiencing a chronic cough and post prandial retching. s/p diagnostic imaging which confirmed recurrence of hiatal hernia. Pt. reports having COVID-19 in 12/20 and pneumonia, managed outpatient. (13 Nov 2023 12:05)    Pt was admitted under [facility] for further evaluation and management. Pt was taken to the OR on 11/30/23, and is s/p laparoscopic herniorrhaphy of hiatal hernia. The patient tolerated the procedure well (see operative report for full details). Pt was transferred to the PACU in stable condition. In the PACU, the patient's pain was controlled and vitals stable. On POC, the patient was doing well. The patient was transferred to the surgical floor in stable condition.     On POD #1, pt was stable and doing well. Pt's Blake was discontinued, and passed TOV. Once IV pain control dosing complete, pt was transitioned to oral Tylenol and Motrin. UGI was performed and was WNL.    Diet was advanced as tolerated to full liquid diet. Patient will go home on puree diet for 2 weeks and followup outpatient.    Physical therapy evaluated the patient and recommended outpatient PT. Script was provided.    On the day of discharge, the patient's vitals are stable, pain is controlled, voiding urine, passing gas/stool, tolerating a full liquid diet, and ambulating well. Pt will f/u with Dr. Galaviz in 1-2 weeks. Pt will f/u with PCP in 1-2 weeks. HPI: 73 year old female presents for laparoscopic recurrent hiatal hernia repair. Pt. with a history of hiatal hernia repair 20 years ago and reports experiencing a chronic cough and post prandial retching. s/p diagnostic imaging which confirmed recurrence of hiatal hernia. Pt. reports having COVID-19 in 12/20 and pneumonia, managed outpatient. (13 Nov 2023 12:05)    Pt was admitted under [facility] for further evaluation and management. Pt was taken to the OR on 11/30/23, and is s/p laparoscopic herniorrhaphy of hiatal hernia. The patient tolerated the procedure well (see operative report for full details). Pt was transferred to the PACU in stable condition. In the PACU, the patient's pain was controlled and vitals stable. On POC, the patient was doing well. The patient was transferred to the surgical floor in stable condition.     On POD #1, pt was stable and doing well. Pt's Blake was discontinued, and passed TOV. Once IV pain control dosing complete, pt was transitioned to oral Tylenol and Motrin. UGI was performed and was WNL.    Diet was advanced as tolerated to full liquid diet. Patient will go home on puree diet for 2 weeks and followup outpatient.    Physical therapy evaluated the patient and recommended outpatient PT. Script was provided.    On the day of discharge, the patient's vitals are stable, pain is controlled, voiding urine, passing gas/stool, tolerating a full liquid diet, and ambulating well. Pt will f/u with Dr. Galaviz in 1-2 weeks. Pt will f/u with PCP in 1-2 weeks. HPI: 73 year old female presents for laparoscopic recurrent hiatal hernia repair. Pt. with a history of hiatal hernia repair 20 years ago and reports experiencing a chronic cough and post prandial retching. s/p diagnostic imaging which confirmed recurrence of hiatal hernia. Pt. reports having COVID-19 in 12/20 and pneumonia, managed outpatient. (13 Nov 2023 12:05)    Pt was admitted under [facility] for further evaluation and management. Pt was taken to the OR on 11/30/23, and is s/p laparoscopic herniorrhaphy of hiatal hernia. The patient tolerated the procedure well (see operative report for full details). Pt was transferred to the PACU in stable condition. In the PACU, the patient's pain was controlled and vitals stable. On POC, the patient was doing well. The patient was transferred to the surgical floor in stable condition.     On POD #1, pt was stable and doing well. Pt's Blake was discontinued, and passed TOV. Once IV pain control dosing complete, pt was transitioned to oral Tylenol and Motrin. UGI was performed and was WNL.    Diet was advanced as tolerated to full liquid diet. Patient will go home on puree diet for 2 weeks and followup outpatient.    Physical therapy evaluated the patient and recommended outpatient PT. Script was provided.    On the day of discharge, the patient's vitals are stable, pain is controlled, voiding urine, passing gas/stool, tolerating a full liquid diet, and ambulating well. Pt will f/u with Dr. Galaviz in 1-2 weeks. Pt will f/u with PCP in 1-2 weeks.

## 2023-12-01 NOTE — DISCHARGE NOTE PROVIDER - CARE PROVIDERS DIRECT ADDRESSES
[email or web address]

## 2023-12-01 NOTE — DIETITIAN INITIAL EVALUATION ADULT - NSICDXPASTMEDICALHX_GEN_ALL_CORE_FT
PAST MEDICAL HISTORY:  2019 novel coronavirus disease (COVID-19) Dec 2020- hospitalized for 3 days, did not require home O2, denies residual symptoms    Depression     Essential hypertension     Fungal infection of skin under breast    GERD (gastroesophageal reflux disease)     H/O scoliosis     H/O seasonal allergies     Hiatal hernia     History of chronic pain     History of pulmonary embolism developed after billings abigail surgery,1984 treated with coumadin 3 months, no issues after    OA (osteoarthritis)     Osteoporosis     Peripheral neuropathy     Restless leg syndrome     Right hip pain

## 2023-12-01 NOTE — PHYSICAL THERAPY INITIAL EVALUATION ADULT - PERTINENT HX OF CURRENT PROBLEM, REHAB EVAL
74yo 80kg f w pmh htn, hld, gerd w hiatal hernia, oab, obesity, allergic rhinitis, oa s/p l+r thr and r tkr, chronic lbp 2/2 scoliosis s/p multiple spine surgeries c/b post pe, osteoporosis, rls, p/w generalized weakness/fatigue/malaise + nausea; in er, found to have lyte insufficiencies; admit to medicine for further mgmt. CXR: No pneumothorax. Pt s/p Laparoscopic herniorrhaphy of hiatal hernia on 11/30/23.

## 2023-12-01 NOTE — DISCHARGE NOTE PROVIDER - CARE PROVIDER_API CALL
Ethan Galaviz  Surgery  [address] [place] [address], [facility] [address]  [place] [address]  Phone: [phone]  Fax: [phone]  Follow Up Time: 2 weeks   Ethan Galaviz  Surgery  [address] [place] [address], [facility] [address]  [place] [address]  Phone: [phone]  Fax: [phone]  Follow Up Time: 2 weeks   Ethan Galaviz  Surgery  [address] [place] [address], [facility] [address]  [place] [address]  Phone: [phone]  Fax: [phone]  Follow Up Time: 2 weeks

## 2023-12-01 NOTE — DIETITIAN INITIAL EVALUATION ADULT - NSICDXPASTSURGICALHX_GEN_ALL_CORE_FT
PAST SURGICAL HISTORY:  H/O arthroscopy of knee June 2021    History of bilateral hip replacements     Removal of pin, plate, abigail, or screw 1991- removal of billings abigail    S/P cataract surgery     S/P dilatation and curettage 1981    S/P hip replacement left (2008)    S/P hysterectomy 1982    S/P repair of paraesophageal hernia 2001    S/P shoulder surgery right (2012)    S/P spinal fusion L4-L5 1966    S/P spinal surgery x 2 1985, 1986    Scoliosis s/p placement of billings abigail x 2 1984

## 2023-12-01 NOTE — DISCHARGE NOTE PROVIDER - PROVIDER TOKENS
PROVIDER:[TOKEN:[[id number]],FOLLOWUP:[2 weeks]] PROVIDER:[TOKEN:[[id number]],FOLLOWUP:[2 weeks]]

## 2023-12-01 NOTE — DISCHARGE NOTE PROVIDER - NSDCCPCAREPLAN_GEN_ALL_CORE_FT
PRINCIPAL DISCHARGE DIAGNOSIS  Diagnosis: Hernia, hiatal  Assessment and Plan of Treatment: Status post laproscopic recurrent hiatal hernia repair  WOUND CARE: You may remove the clear dressing tomorrow (12/02/23) days after surgery, underneath you will find steri strips resembling tape. Please leave these on. If they come off on their own- that is okay. Otherwise, your surgeon will remove them in the office.  BATHING: You may shower and/or sponge bathe 24 hours after surgery. Do no submerge the incision underwater for the next 2 weeks.  ACTIVITY: No heavy lifting anything more than 10-15lbs or straining. Otherwise, you may return to your usual level of physical activity. If you are taking narcotic pain medication (such as Oxycodone) do NOT drive a car, operate machinery or make important decisions.  DIET: Maintain puree diet for 2 weeks until your follow up appointment.  PAIN: A prescription for oxycodone has been sent to the pharmacy. You should only take these for severe pain. For mild or moderate pain, you may take 975mg of tylenol every 6 hours. Please take tylenol every 6 hours, and stagger with ibuprofen every 6 hours. This will allow you to alternate medications for more consistent pain control. For example: take a dose of tylenol at 8 am, then take a dose of ibuprofen at 11 am, then take a dose of tylenol at 2pm, and continue as needed. Do not exceed more than 4G tylenol per day.   NOTIFY YOUR SURGEON IF: You have any bleeding that does not stop, any pus draining from your wound, any fever (over 100.4 F) or chills, persistent nausea/vomiting with inability to tolerate food or liquids, persistent diarrhea, or if your pain is not controlled on your discharge pain medications.  FOLLOW-UP:  1. Please call to make a follow-up appointment within one to two weeks of discharge with Dr. Galaviz.  2. Please follow up with your primary care physician in one week regarding your hospitalization.

## 2023-12-01 NOTE — PROGRESS NOTE ADULT - ATTENDING COMMENTS
I saw and examined the patient, and reviewed  the history with the patient and   Agree with note which was also reviewed and edited where appropriate.  D/W patient, RN, residents and Fellow  will check UGI

## 2023-12-01 NOTE — DISCHARGE NOTE PROVIDER - NSDCFUADDINST_GEN_ALL_CORE_FT
Thin puree for 2 weeks.  No carbonated beverages. Avoid liquids with sugar and carbohydrates. Follow up with your dietician in 30 days.  Water, sugar free flavored water, unsweetened decaf tea or decaf coffee, unsweetened soy/nut/coconut milk, non-fat milk, low sodium broth, low sodium, tomato juice, clear fruit juices diluted 50% with water, low sodium creamy soups without chunks (pure) ex. tomato, carrot tej, roasted red pepper, butternut squash, ect., Vitamin Water Zero, Crystal Light, [id number], Propel, Powerade Zero, Hint water, [id number], sugar free hot cocoa made with non-fat milk, sugar free popsicles. Read to drink protein shakes. NO CARBONATED BEVERAGES.   No water during meals, drink water 1 hour prior to or after meals. Drink at least 1.5 liter of non-carbonated low-calorie liquids at room temperature throughout the day to avoid dehydration. Some signs of dehydration include dry mouth and dark urine. Avoid gulping as this can cause pain and discomfort. Thin puree for 2 weeks.  No carbonated beverages. Avoid liquids with sugar and carbohydrates. Follow up with your dietician in 30 days.  Water, sugar free flavored water, unsweetened decaf tea or decaf coffee, unsweetened soy/nut/coconut milk, non-fat milk, low sodium broth, low sodium, tomato juice, clear fruit juices diluted 50% with water, low sodium creamy soups without chunks (pure) ex. tomato, carrot tej, roasted red pepper, butternut squash, ect., Vitamin Water Zero, Crystal Light, [id number], Propel, Powerade Zero, Hint water, [id number], sugar free hot cocoa made with non-fat milk, sugar free popsicles. Read to drink protein shakes. NO CARBONATED BEVERAGES.   No water during meals, drink water 1 hour prior to or after meals. Drink at least 1.5 liter of non-carbonated low-calorie liquids at room temperature throughout the day to avoid dehydration. Some signs of dehydration include dry mouth and dark urine. Avoid gulping as this can cause pain and discomfort. Thin puree for 2 weeks.  No carbonated beverages. Avoid liquids with sugar and carbohydrates. Follow up with your dietician in 30 days.  Water, sugar free flavored water, unsweetened decaf tea or decaf coffee, unsweetened soy/nut/coconut milk, non-fat milk, low sodium broth, low sodium, tomato juice, clear fruit juices diluted 50% with water, low sodium creamy soups without chunks (pure) ex. tomato, carrot tej, roasted red pepper, butternut squash, ect., Vitamin Water Zero, Crystal Light, [id number], Propel, Powerade Zero, Hint water, [id number], sugar free hot cocoa made with non-fat milk, sugar free popsicles. Read to drink protein shakes. NO CARBONATED BEVERAGES.   No water during meals, drink water 1 hour prior to or after meals. Drink at least 1.5 liter of non-carbonated low-calorie liquids at room temperature throughout the day to avoid dehydration. Some signs of dehydration include dry mouth and dark urine. Avoid gulping as this can cause pain and discomfort.

## 2023-12-02 RX ORDER — OMEPRAZOLE 10 MG/1
1 CAPSULE, DELAYED RELEASE ORAL
Qty: 14 | Refills: 0
Start: 2023-12-02 | End: 2023-12-15

## 2023-12-07 ENCOUNTER — APPOINTMENT (OUTPATIENT)
Dept: INTERNAL MEDICINE | Facility: CLINIC | Age: 73
End: 2023-12-07
Payer: MEDICARE

## 2023-12-07 ENCOUNTER — OUTPATIENT (OUTPATIENT)
Dept: OUTPATIENT SERVICES | Facility: HOSPITAL | Age: 73
LOS: 1 days | End: 2023-12-07
Payer: MEDICARE

## 2023-12-07 DIAGNOSIS — Z98.49 CATARACT EXTRACTION STATUS, UNSPECIFIED EYE: Chronic | ICD-10-CM

## 2023-12-07 DIAGNOSIS — Z98.890 OTHER SPECIFIED POSTPROCEDURAL STATES: Chronic | ICD-10-CM

## 2023-12-07 DIAGNOSIS — I10 ESSENTIAL (PRIMARY) HYPERTENSION: ICD-10-CM

## 2023-12-07 PROCEDURE — 99213 OFFICE O/P EST LOW 20 MIN: CPT

## 2023-12-07 PROCEDURE — G0463: CPT

## 2023-12-09 VITALS — SYSTOLIC BLOOD PRESSURE: 138 MMHG | HEART RATE: 80 BPM | DIASTOLIC BLOOD PRESSURE: 78 MMHG | RESPIRATION RATE: 14 BRPM

## 2023-12-13 ENCOUNTER — APPOINTMENT (OUTPATIENT)
Dept: PULMONOLOGY | Facility: CLINIC | Age: 73
End: 2023-12-13
Payer: MEDICARE

## 2023-12-13 VITALS
OXYGEN SATURATION: 98 % | BODY MASS INDEX: 33.13 KG/M2 | WEIGHT: 180 LBS | HEIGHT: 62 IN | SYSTOLIC BLOOD PRESSURE: 138 MMHG | DIASTOLIC BLOOD PRESSURE: 78 MMHG

## 2023-12-13 PROCEDURE — 94060 EVALUATION OF WHEEZING: CPT

## 2023-12-13 PROCEDURE — 94726 PLETHYSMOGRAPHY LUNG VOLUMES: CPT

## 2023-12-13 PROCEDURE — 94729 DIFFUSING CAPACITY: CPT

## 2023-12-13 RX ORDER — BUDESONIDE AND FORMOTEROL FUMARATE DIHYDRATE 80; 4.5 UG/1; UG/1
80-4.5 AEROSOL RESPIRATORY (INHALATION)
Qty: 1 | Refills: 3 | Status: ACTIVE | COMMUNITY
Start: 2023-12-13 | End: 1900-01-01

## 2023-12-13 RX ORDER — FLUTICASONE PROPIONATE AND SALMETEROL 100; 50 UG/1; UG/1
100-50 POWDER RESPIRATORY (INHALATION)
Qty: 1 | Refills: 5 | Status: DISCONTINUED | COMMUNITY
Start: 2023-12-13 | End: 2023-12-13

## 2023-12-14 ENCOUNTER — NON-APPOINTMENT (OUTPATIENT)
Age: 73
End: 2023-12-14

## 2023-12-15 ENCOUNTER — APPOINTMENT (OUTPATIENT)
Dept: SURGERY | Facility: CLINIC | Age: 73
End: 2023-12-15
Payer: MEDICARE

## 2023-12-15 DIAGNOSIS — U07.1 COVID-19: ICD-10-CM

## 2023-12-15 PROCEDURE — 99024 POSTOP FOLLOW-UP VISIT: CPT

## 2023-12-15 NOTE — ASSESSMENT
[FreeTextEntry1] : 73-year-old female status post repair of recurrent hiatal hernia doing very well her symptoms prior to the operation are gone and she is tolerating a diet she is here on telehealth due to recent diagnosis of COVID she will follow-up with me in 2 months I have advanced her diet all of her questions were answered

## 2023-12-15 NOTE — HISTORY OF PRESENT ILLNESS
[de-identified] : Haley is a 73-year-old female that presents for initial postoperative visit status post laparoscopic hiatal hernia repair on November 30.  This patient is doing very well after her repair of her recurrence she is no more retching she is tolerating a diet no nausea no vomiting she denies nocturnal regurgitation or reflux she is still on soft mechanical diet but is having no dysphagia visit is as per telehealth

## 2023-12-19 ENCOUNTER — APPOINTMENT (OUTPATIENT)
Dept: UROGYNECOLOGY | Facility: CLINIC | Age: 73
End: 2023-12-19

## 2023-12-19 RX ORDER — TRIAMCINOLONE ACETONIDE 1 MG/G
0.1 CREAM TOPICAL TWICE DAILY
Qty: 1 | Refills: 0 | Status: ACTIVE | COMMUNITY
Start: 2023-12-19 | End: 1900-01-01

## 2023-12-21 ENCOUNTER — APPOINTMENT (OUTPATIENT)
Dept: DERMATOLOGY | Facility: CLINIC | Age: 73
End: 2023-12-21
Payer: MEDICARE

## 2023-12-21 ENCOUNTER — RESULT REVIEW (OUTPATIENT)
Age: 73
End: 2023-12-21

## 2023-12-21 ENCOUNTER — APPOINTMENT (OUTPATIENT)
Dept: MRI IMAGING | Facility: CLINIC | Age: 73
End: 2023-12-21
Payer: MEDICARE

## 2023-12-21 DIAGNOSIS — R05.9 COUGH, UNSPECIFIED: ICD-10-CM

## 2023-12-21 PROCEDURE — 99214 OFFICE O/P EST MOD 30 MIN: CPT

## 2023-12-21 RX ORDER — TRIAMCINOLONE ACETONIDE 1 MG/G
0.1 CREAM TOPICAL
Qty: 1 | Refills: 1 | Status: ACTIVE | COMMUNITY
Start: 2023-12-21 | End: 1900-01-01

## 2023-12-21 NOTE — HISTORY OF PRESENT ILLNESS
[FreeTextEntry1] : rash [de-identified] : status post laparoscopic hiatal hernia repair on November 30.  rash for few days in groin, under breasts, buttocks - asymptomatic although groin irritates skin given short course of oral prednisone without change recently diagnosed with covid ~8 days ago new med of omeprazole, took paxlovid last week

## 2023-12-21 NOTE — ASSESSMENT
[FreeTextEntry1] : Localized rash -  New diagnosis, uncertain clinical course Differential includes contact dermatitis (consider to antiseptic in surgery although surgery ~ 1 month ago) vs medication reaction (? paxlovid) vs viral exanthem (to covid). Would favor drug hypersensitivity or viral exanthem if becomes more generalized As asymptomatic and limited in exam, recommend observation with topical steroids (TAC 0.1% cream BID x 2 weeks) with close monitoring for evolution. Side effects of long term use of topical steroids discussed with patient including but not limited to thinning of the skin, atrophy and dyspigmentation.

## 2023-12-22 ENCOUNTER — APPOINTMENT (OUTPATIENT)
Dept: SURGERY | Facility: CLINIC | Age: 73
End: 2023-12-22
Payer: MEDICARE

## 2023-12-22 DIAGNOSIS — K44.9 DIAPHRAGMATIC HERNIA W/OUT OBSTRUCTION OR GANGRENE: ICD-10-CM

## 2023-12-22 PROCEDURE — 99024 POSTOP FOLLOW-UP VISIT: CPT

## 2023-12-22 NOTE — HISTORY OF PRESENT ILLNESS
[de-identified] : Haley is a 73-year-old female that presents for initial postoperative visit status post laparoscopic hiatal hernia repair on November 30.  73-year-old female status post hiatal hernia repair has been doing well tolerating a diet recently is complained of some reflux she takes Pepcid at night and some Tums no nausea no vomiting no dysphagia her retching has gone away and her cough seems to be getting better

## 2023-12-22 NOTE — ASSESSMENT
[FreeTextEntry1] : 73-year-old female status post hiatal hernia repair has been doing very well she has recently complained of heartburn and reflux though today it is much better I will put her on Prevacid in the morning Pepcid at night and she will contact us in 2 weeks.  All of her questions were answered

## 2023-12-23 NOTE — DISCHARGE NOTE NURSING/CASE MANAGEMENT/SOCIAL WORK - NSDCPEFALRISK_GEN_ALL_CORE
For information on Fall & Injury Prevention, visit: [email or web address] OR  [email or web address] OR  https://www.cdc.gov/steadi/patient.html
10 (severe pain)

## 2023-12-24 ENCOUNTER — NON-APPOINTMENT (OUTPATIENT)
Age: 73
End: 2023-12-24

## 2023-12-24 DIAGNOSIS — B37.0 CANDIDAL STOMATITIS: ICD-10-CM

## 2023-12-24 RX ORDER — FLUCONAZOLE 100 MG/1
100 TABLET ORAL DAILY
Qty: 14 | Refills: 0 | Status: ACTIVE | COMMUNITY
Start: 2023-12-24 | End: 1900-01-01

## 2023-12-26 ENCOUNTER — OUTPATIENT (OUTPATIENT)
Dept: OUTPATIENT SERVICES | Facility: HOSPITAL | Age: 73
LOS: 1 days | End: 2023-12-26
Payer: MEDICARE

## 2023-12-26 ENCOUNTER — APPOINTMENT (OUTPATIENT)
Dept: MRI IMAGING | Facility: CLINIC | Age: 73
End: 2023-12-26
Payer: MEDICARE

## 2023-12-26 ENCOUNTER — APPOINTMENT (OUTPATIENT)
Dept: INTERNAL MEDICINE | Facility: CLINIC | Age: 73
End: 2023-12-26
Payer: MEDICARE

## 2023-12-26 VITALS
BODY MASS INDEX: 34.6 KG/M2 | HEIGHT: 62 IN | OXYGEN SATURATION: 96 % | WEIGHT: 188 LBS | DIASTOLIC BLOOD PRESSURE: 74 MMHG | SYSTOLIC BLOOD PRESSURE: 130 MMHG | HEART RATE: 103 BPM

## 2023-12-26 DIAGNOSIS — R21 RASH AND OTHER NONSPECIFIC SKIN ERUPTION: ICD-10-CM

## 2023-12-26 DIAGNOSIS — Z98.49 CATARACT EXTRACTION STATUS, UNSPECIFIED EYE: Chronic | ICD-10-CM

## 2023-12-26 DIAGNOSIS — Z96.643 PRESENCE OF ARTIFICIAL HIP JOINT, BILATERAL: Chronic | ICD-10-CM

## 2023-12-26 DIAGNOSIS — Z98.890 OTHER SPECIFIED POSTPROCEDURAL STATES: Chronic | ICD-10-CM

## 2023-12-26 DIAGNOSIS — I10 ESSENTIAL (PRIMARY) HYPERTENSION: ICD-10-CM

## 2023-12-26 PROCEDURE — 72148 MRI LUMBAR SPINE W/O DYE: CPT | Mod: MH

## 2023-12-26 PROCEDURE — 99214 OFFICE O/P EST MOD 30 MIN: CPT

## 2023-12-26 PROCEDURE — G0463: CPT

## 2023-12-26 RX ORDER — NIRMATRELVIR AND RITONAVIR 300-100 MG
20 X 150 MG & KIT ORAL
Qty: 1 | Refills: 0 | Status: DISCONTINUED | COMMUNITY
Start: 2023-12-15 | End: 2023-12-26

## 2023-12-26 RX ORDER — METHYLPREDNISOLONE 4 MG/1
4 TABLET ORAL
Qty: 1 | Refills: 0 | Status: DISCONTINUED | COMMUNITY
Start: 2023-12-19 | End: 2023-12-26

## 2023-12-26 RX ORDER — PROCHLORPERAZINE 25 MG/1
25 SUPPOSITORY RECTAL TWICE DAILY
Qty: 1 | Refills: 0 | Status: DISCONTINUED | COMMUNITY
Start: 2023-10-12 | End: 2023-12-26

## 2023-12-26 RX ORDER — OMEPRAZOLE 20 MG/1
20 CAPSULE, DELAYED RELEASE ORAL
Qty: 60 | Refills: 5 | Status: DISCONTINUED | COMMUNITY
Start: 2023-10-18 | End: 2023-12-26

## 2023-12-26 RX ORDER — PROMETHAZINE HYDROCHLORIDE AND DEXTROMETHORPHAN HYDROBROMIDE ORAL SOLUTION 15; 6.25 MG/5ML; MG/5ML
6.25-15 SOLUTION ORAL
Qty: 1 | Refills: 3 | Status: DISCONTINUED | COMMUNITY
Start: 2023-12-07 | End: 2023-12-26

## 2023-12-26 RX ORDER — VIBEGRON 75 MG/1
75 TABLET, FILM COATED ORAL
Qty: 30 | Refills: 1 | Status: DISCONTINUED | COMMUNITY
Start: 2023-02-22 | End: 2023-12-26

## 2023-12-27 ENCOUNTER — OUTPATIENT (OUTPATIENT)
Dept: OUTPATIENT SERVICES | Facility: HOSPITAL | Age: 73
LOS: 1 days | End: 2023-12-27
Payer: MEDICARE

## 2023-12-27 ENCOUNTER — APPOINTMENT (OUTPATIENT)
Dept: RADIOLOGY | Facility: HOSPITAL | Age: 73
End: 2023-12-27

## 2023-12-27 DIAGNOSIS — R11.2 NAUSEA WITH VOMITING, UNSPECIFIED: ICD-10-CM

## 2023-12-27 DIAGNOSIS — Z98.890 OTHER SPECIFIED POSTPROCEDURAL STATES: Chronic | ICD-10-CM

## 2023-12-27 DIAGNOSIS — K21.9 GASTRO-ESOPHAGEAL REFLUX DISEASE WITHOUT ESOPHAGITIS: ICD-10-CM

## 2023-12-27 PROCEDURE — 74246 X-RAY XM UPR GI TRC 2CNTRST: CPT | Mod: 26

## 2023-12-28 ENCOUNTER — EMERGENCY (EMERGENCY)
Facility: HOSPITAL | Age: 73
LOS: 1 days | Discharge: HOME | End: 2023-12-28
Attending: EMERGENCY MEDICINE
Payer: MEDICARE

## 2023-12-28 VITALS
OXYGEN SATURATION: 98 % | WEIGHT: 184.97 LBS | HEIGHT: 62 IN | SYSTOLIC BLOOD PRESSURE: 145 MMHG | TEMPERATURE: 98 F | DIASTOLIC BLOOD PRESSURE: 88 MMHG | HEART RATE: 96 BPM | RESPIRATION RATE: 16 BRPM

## 2023-12-28 DIAGNOSIS — Z98.49 CATARACT EXTRACTION STATUS, UNSPECIFIED EYE: Chronic | ICD-10-CM

## 2023-12-28 DIAGNOSIS — Z98.890 OTHER SPECIFIED POSTPROCEDURAL STATES: Chronic | ICD-10-CM

## 2023-12-28 DIAGNOSIS — Z96.643 PRESENCE OF ARTIFICIAL HIP JOINT, BILATERAL: Chronic | ICD-10-CM

## 2023-12-28 LAB
ALBUMIN SERPL ELPH-MCNC: 3.8 G/DL — SIGNIFICANT CHANGE UP (ref 3.3–5)
ALBUMIN SERPL ELPH-MCNC: 3.8 G/DL — SIGNIFICANT CHANGE UP (ref 3.3–5)
ALP SERPL-CCNC: 130 U/L — HIGH (ref 40–120)
ALP SERPL-CCNC: 130 U/L — HIGH (ref 40–120)
ALT FLD-CCNC: 29 U/L — SIGNIFICANT CHANGE UP (ref 10–45)
ALT FLD-CCNC: 29 U/L — SIGNIFICANT CHANGE UP (ref 10–45)
ANION GAP SERPL CALC-SCNC: 11 MMOL/L — SIGNIFICANT CHANGE UP (ref 5–17)
ANION GAP SERPL CALC-SCNC: 11 MMOL/L — SIGNIFICANT CHANGE UP (ref 5–17)
APTT BLD: 28.8 SEC — SIGNIFICANT CHANGE UP (ref 24.5–35.6)
APTT BLD: 28.8 SEC — SIGNIFICANT CHANGE UP (ref 24.5–35.6)
AST SERPL-CCNC: 18 U/L — SIGNIFICANT CHANGE UP (ref 10–40)
AST SERPL-CCNC: 18 U/L — SIGNIFICANT CHANGE UP (ref 10–40)
BASE EXCESS BLDV CALC-SCNC: 4.4 MMOL/L — HIGH (ref -2–3)
BASE EXCESS BLDV CALC-SCNC: 4.4 MMOL/L — HIGH (ref -2–3)
BASOPHILS # BLD AUTO: 0.05 K/UL — SIGNIFICANT CHANGE UP (ref 0–0.2)
BASOPHILS # BLD AUTO: 0.05 K/UL — SIGNIFICANT CHANGE UP (ref 0–0.2)
BASOPHILS NFR BLD AUTO: 0.7 % — SIGNIFICANT CHANGE UP (ref 0–2)
BASOPHILS NFR BLD AUTO: 0.7 % — SIGNIFICANT CHANGE UP (ref 0–2)
BILIRUB SERPL-MCNC: 0.5 MG/DL — SIGNIFICANT CHANGE UP (ref 0.2–1.2)
BILIRUB SERPL-MCNC: 0.5 MG/DL — SIGNIFICANT CHANGE UP (ref 0.2–1.2)
BUN SERPL-MCNC: 20 MG/DL — SIGNIFICANT CHANGE UP (ref 7–23)
BUN SERPL-MCNC: 20 MG/DL — SIGNIFICANT CHANGE UP (ref 7–23)
CA-I SERPL-SCNC: 1.22 MMOL/L — SIGNIFICANT CHANGE UP (ref 1.15–1.33)
CA-I SERPL-SCNC: 1.22 MMOL/L — SIGNIFICANT CHANGE UP (ref 1.15–1.33)
CALCIUM SERPL-MCNC: 9.9 MG/DL — SIGNIFICANT CHANGE UP (ref 8.4–10.5)
CALCIUM SERPL-MCNC: 9.9 MG/DL — SIGNIFICANT CHANGE UP (ref 8.4–10.5)
CHLORIDE BLDV-SCNC: 96 MMOL/L — SIGNIFICANT CHANGE UP (ref 96–108)
CHLORIDE BLDV-SCNC: 96 MMOL/L — SIGNIFICANT CHANGE UP (ref 96–108)
CHLORIDE SERPL-SCNC: 99 MMOL/L — SIGNIFICANT CHANGE UP (ref 96–108)
CHLORIDE SERPL-SCNC: 99 MMOL/L — SIGNIFICANT CHANGE UP (ref 96–108)
CO2 BLDV-SCNC: 33 MMOL/L — HIGH (ref 22–26)
CO2 BLDV-SCNC: 33 MMOL/L — HIGH (ref 22–26)
CO2 SERPL-SCNC: 28 MMOL/L — SIGNIFICANT CHANGE UP (ref 22–31)
CO2 SERPL-SCNC: 28 MMOL/L — SIGNIFICANT CHANGE UP (ref 22–31)
CREAT SERPL-MCNC: 0.8 MG/DL — SIGNIFICANT CHANGE UP (ref 0.5–1.3)
CREAT SERPL-MCNC: 0.8 MG/DL — SIGNIFICANT CHANGE UP (ref 0.5–1.3)
EGFR: 77 ML/MIN/1.73M2 — SIGNIFICANT CHANGE UP
EGFR: 77 ML/MIN/1.73M2 — SIGNIFICANT CHANGE UP
EOSINOPHIL # BLD AUTO: 0.11 K/UL — SIGNIFICANT CHANGE UP (ref 0–0.5)
EOSINOPHIL # BLD AUTO: 0.11 K/UL — SIGNIFICANT CHANGE UP (ref 0–0.5)
EOSINOPHIL NFR BLD AUTO: 1.5 % — SIGNIFICANT CHANGE UP (ref 0–6)
EOSINOPHIL NFR BLD AUTO: 1.5 % — SIGNIFICANT CHANGE UP (ref 0–6)
GAS PNL BLDV: 132 MMOL/L — LOW (ref 136–145)
GAS PNL BLDV: 132 MMOL/L — LOW (ref 136–145)
GAS PNL BLDV: SIGNIFICANT CHANGE UP
GAS PNL BLDV: SIGNIFICANT CHANGE UP
GLUCOSE BLDV-MCNC: 112 MG/DL — HIGH (ref 70–99)
GLUCOSE BLDV-MCNC: 112 MG/DL — HIGH (ref 70–99)
GLUCOSE SERPL-MCNC: 110 MG/DL — HIGH (ref 70–99)
GLUCOSE SERPL-MCNC: 110 MG/DL — HIGH (ref 70–99)
HCO3 BLDV-SCNC: 32 MMOL/L — HIGH (ref 22–29)
HCO3 BLDV-SCNC: 32 MMOL/L — HIGH (ref 22–29)
HCT VFR BLD CALC: 40.1 % — SIGNIFICANT CHANGE UP (ref 34.5–45)
HCT VFR BLD CALC: 40.1 % — SIGNIFICANT CHANGE UP (ref 34.5–45)
HCT VFR BLDA CALC: 45 % — SIGNIFICANT CHANGE UP (ref 34.5–46.5)
HCT VFR BLDA CALC: 45 % — SIGNIFICANT CHANGE UP (ref 34.5–46.5)
HGB BLD CALC-MCNC: 15.1 G/DL — SIGNIFICANT CHANGE UP (ref 11.7–16.1)
HGB BLD CALC-MCNC: 15.1 G/DL — SIGNIFICANT CHANGE UP (ref 11.7–16.1)
HGB BLD-MCNC: 12.5 G/DL — SIGNIFICANT CHANGE UP (ref 11.5–15.5)
HGB BLD-MCNC: 12.5 G/DL — SIGNIFICANT CHANGE UP (ref 11.5–15.5)
IMM GRANULOCYTES NFR BLD AUTO: 0.4 % — SIGNIFICANT CHANGE UP (ref 0–0.9)
IMM GRANULOCYTES NFR BLD AUTO: 0.4 % — SIGNIFICANT CHANGE UP (ref 0–0.9)
INR BLD: 1.03 RATIO — SIGNIFICANT CHANGE UP (ref 0.85–1.18)
INR BLD: 1.03 RATIO — SIGNIFICANT CHANGE UP (ref 0.85–1.18)
LACTATE BLDV-MCNC: 1.7 MMOL/L — SIGNIFICANT CHANGE UP (ref 0.5–2)
LACTATE BLDV-MCNC: 1.7 MMOL/L — SIGNIFICANT CHANGE UP (ref 0.5–2)
LIDOCAIN IGE QN: 28 U/L — SIGNIFICANT CHANGE UP (ref 7–60)
LIDOCAIN IGE QN: 28 U/L — SIGNIFICANT CHANGE UP (ref 7–60)
LYMPHOCYTES # BLD AUTO: 0.93 K/UL — LOW (ref 1–3.3)
LYMPHOCYTES # BLD AUTO: 0.93 K/UL — LOW (ref 1–3.3)
LYMPHOCYTES # BLD AUTO: 12.7 % — LOW (ref 13–44)
LYMPHOCYTES # BLD AUTO: 12.7 % — LOW (ref 13–44)
MCHC RBC-ENTMCNC: 27.8 PG — SIGNIFICANT CHANGE UP (ref 27–34)
MCHC RBC-ENTMCNC: 27.8 PG — SIGNIFICANT CHANGE UP (ref 27–34)
MCHC RBC-ENTMCNC: 31.2 GM/DL — LOW (ref 32–36)
MCHC RBC-ENTMCNC: 31.2 GM/DL — LOW (ref 32–36)
MCV RBC AUTO: 89.3 FL — SIGNIFICANT CHANGE UP (ref 80–100)
MCV RBC AUTO: 89.3 FL — SIGNIFICANT CHANGE UP (ref 80–100)
MONOCYTES # BLD AUTO: 0.56 K/UL — SIGNIFICANT CHANGE UP (ref 0–0.9)
MONOCYTES # BLD AUTO: 0.56 K/UL — SIGNIFICANT CHANGE UP (ref 0–0.9)
MONOCYTES NFR BLD AUTO: 7.7 % — SIGNIFICANT CHANGE UP (ref 2–14)
MONOCYTES NFR BLD AUTO: 7.7 % — SIGNIFICANT CHANGE UP (ref 2–14)
NEUTROPHILS # BLD AUTO: 5.63 K/UL — SIGNIFICANT CHANGE UP (ref 1.8–7.4)
NEUTROPHILS # BLD AUTO: 5.63 K/UL — SIGNIFICANT CHANGE UP (ref 1.8–7.4)
NEUTROPHILS NFR BLD AUTO: 77 % — SIGNIFICANT CHANGE UP (ref 43–77)
NEUTROPHILS NFR BLD AUTO: 77 % — SIGNIFICANT CHANGE UP (ref 43–77)
NRBC # BLD: 0 /100 WBCS — SIGNIFICANT CHANGE UP (ref 0–0)
NRBC # BLD: 0 /100 WBCS — SIGNIFICANT CHANGE UP (ref 0–0)
OTHER CELLS CSF MANUAL: 5.1 ML/DL — LOW (ref 18–22)
OTHER CELLS CSF MANUAL: 5.1 ML/DL — LOW (ref 18–22)
PCO2 BLDV: 56 MMHG — HIGH (ref 39–42)
PCO2 BLDV: 56 MMHG — HIGH (ref 39–42)
PH BLDV: 7.36 — SIGNIFICANT CHANGE UP (ref 7.32–7.43)
PH BLDV: 7.36 — SIGNIFICANT CHANGE UP (ref 7.32–7.43)
PLATELET # BLD AUTO: 289 K/UL — SIGNIFICANT CHANGE UP (ref 150–400)
PLATELET # BLD AUTO: 289 K/UL — SIGNIFICANT CHANGE UP (ref 150–400)
PO2 BLDV: 20 MMHG — LOW (ref 25–45)
PO2 BLDV: 20 MMHG — LOW (ref 25–45)
POTASSIUM BLDV-SCNC: 3.4 MMOL/L — LOW (ref 3.5–5.1)
POTASSIUM BLDV-SCNC: 3.4 MMOL/L — LOW (ref 3.5–5.1)
POTASSIUM SERPL-MCNC: 3.8 MMOL/L — SIGNIFICANT CHANGE UP (ref 3.5–5.3)
POTASSIUM SERPL-MCNC: 3.8 MMOL/L — SIGNIFICANT CHANGE UP (ref 3.5–5.3)
POTASSIUM SERPL-SCNC: 3.8 MMOL/L — SIGNIFICANT CHANGE UP (ref 3.5–5.3)
POTASSIUM SERPL-SCNC: 3.8 MMOL/L — SIGNIFICANT CHANGE UP (ref 3.5–5.3)
PROT SERPL-MCNC: 6.8 G/DL — SIGNIFICANT CHANGE UP (ref 6–8.3)
PROT SERPL-MCNC: 6.8 G/DL — SIGNIFICANT CHANGE UP (ref 6–8.3)
PROTHROM AB SERPL-ACNC: 10.8 SEC — SIGNIFICANT CHANGE UP (ref 9.5–13)
PROTHROM AB SERPL-ACNC: 10.8 SEC — SIGNIFICANT CHANGE UP (ref 9.5–13)
RBC # BLD: 4.49 M/UL — SIGNIFICANT CHANGE UP (ref 3.8–5.2)
RBC # BLD: 4.49 M/UL — SIGNIFICANT CHANGE UP (ref 3.8–5.2)
RBC # FLD: 15.1 % — HIGH (ref 10.3–14.5)
RBC # FLD: 15.1 % — HIGH (ref 10.3–14.5)
SAO2 % BLDV: 28.5 % — LOW (ref 67–88)
SAO2 % BLDV: 28.5 % — LOW (ref 67–88)
SODIUM SERPL-SCNC: 138 MMOL/L — SIGNIFICANT CHANGE UP (ref 135–145)
SODIUM SERPL-SCNC: 138 MMOL/L — SIGNIFICANT CHANGE UP (ref 135–145)
WBC # BLD: 7.31 K/UL — SIGNIFICANT CHANGE UP (ref 3.8–10.5)
WBC # BLD: 7.31 K/UL — SIGNIFICANT CHANGE UP (ref 3.8–10.5)
WBC # FLD AUTO: 7.31 K/UL — SIGNIFICANT CHANGE UP (ref 3.8–10.5)
WBC # FLD AUTO: 7.31 K/UL — SIGNIFICANT CHANGE UP (ref 3.8–10.5)

## 2023-12-28 PROCEDURE — 99223 1ST HOSP IP/OBS HIGH 75: CPT | Mod: FS,GC

## 2023-12-28 PROCEDURE — 74018 RADEX ABDOMEN 1 VIEW: CPT | Mod: 26,MH

## 2023-12-28 PROCEDURE — 71045 X-RAY EXAM CHEST 1 VIEW: CPT | Mod: 26

## 2023-12-28 RX ORDER — SODIUM CHLORIDE 9 MG/ML
1000 INJECTION, SOLUTION INTRAVENOUS ONCE
Refills: 0 | Status: COMPLETED | OUTPATIENT
Start: 2023-12-28 | End: 2023-12-28

## 2023-12-28 RX ORDER — FAMOTIDINE 10 MG/ML
20 INJECTION INTRAVENOUS ONCE
Refills: 0 | Status: COMPLETED | OUTPATIENT
Start: 2023-12-28 | End: 2023-12-28

## 2023-12-28 RX ADMIN — SODIUM CHLORIDE 1000 MILLILITER(S): 9 INJECTION, SOLUTION INTRAVENOUS at 20:35

## 2023-12-28 RX ADMIN — FAMOTIDINE 20 MILLIGRAM(S): 10 INJECTION INTRAVENOUS at 20:34

## 2023-12-28 NOTE — ED ADULT NURSE NOTE - NSFALLUNIVINTERV_ED_ALL_ED
Bed/Stretcher in lowest position, wheels locked, appropriate side rails in place/Call bell, personal items and telephone in reach/Instruct patient to call for assistance before getting out of bed/chair/stretcher/Non-slip footwear applied when patient is off stretcher/[place] to call system/Physically safe environment - no spills, clutter or unnecessary equipment/Purposeful proactive rounding/Room/bathroom lighting operational, light cord in reach Bed/Stretcher in lowest position, wheels locked, appropriate side rails in place/Call bell, personal items and telephone in reach/Instruct patient to call for assistance before getting out of bed/chair/stretcher/Non-slip footwear applied when patient is off stretcher/[place] to call system/Physically safe environment - no spills, clutter or unnecessary equipment/Purposeful proactive rounding/Room/bathroom lighting operational, light cord in reach

## 2023-12-28 NOTE — ED PROVIDER NOTE - RAPID ASSESSMENT
73 year old female presents for epigastric abdominal discomfort and inability to eat that is progressively worsening over the last 4 weeks since  laparoscopic repair of recurrent hiatal hernia repair. Follows with Dr. Galaviz. followed up and had an Upper GI series yesterday that was unremarkable. patient is without fever, diarrhea. patient contacted Dr. Westfall office who recommended she come in for CTAP. reports part of plan was endoscopy however she is still to close postoperatively so it has not been scheduled it. postoperatively patient is on famotidine. dexlansoprazole. patient also notes sore throat x 1 day.

## 2023-12-28 NOTE — ED PROVIDER NOTE - OBJECTIVE STATEMENT
73 year old female presents for epigastric abdominal discomfort and inability to eat that is progressively worsening over the last 4 weeks since  laparoscopic repair of recurrent hiatal hernia repair. Follows with Dr. Galaviz. followed up and had an Upper GI series yesterday that was unremarkable. patient is without fever, diarrhea. patient contacted Dr. Westfall office who recommended she come in for CTAP. reports part of plan was endoscopy however she is still to close postoperatively so it has not been scheduled it. postoperatively patient is on famotidine. dexlansoprazole. patient also notes sore throat x 1 day. 73 year old female presents for inability to eat that is progressively worsening over the last 4 weeks since laparoscopic repair of recurrent hiatal hernia. Follows with Dr. Galaviz. followed up and had an Upper GI series yesterday that was unremarkable. patient is without fever, diarrhea. patient contacted Dr. Westfall office who recommended she come. reports part of plan was endoscopy however she is still to close postoperatively so it has not been scheduled it. postoperatively patient is on famotidine. dexlansoprazole. patient also notes sore throat x 1 day. Not having abdominal pain currently, just burning at the bottom of her throat ascending upwards.

## 2023-12-28 NOTE — ED PROVIDER NOTE - CLINICAL SUMMARY MEDICAL DECISION MAKING FREE TEXT BOX
73 year old female presents for epigastric abdominal discomfort and inability to eat that is progressively worsening over the last 4 weeks since  laparoscopic repair of recurrent hiatal hernia repair. Vitals wnl. physical exam significant for abdomen soft, nontender, lungs clear, heart rrr. Likely worsening GERD, will obtain labs, CTAP, give pepcid, fluids, admit vs CDU for continued hydration and pepcid

## 2023-12-29 ENCOUNTER — APPOINTMENT (OUTPATIENT)
Dept: SURGERY | Facility: CLINIC | Age: 73
End: 2023-12-29

## 2023-12-29 VITALS
TEMPERATURE: 98 F | OXYGEN SATURATION: 99 % | HEART RATE: 86 BPM | DIASTOLIC BLOOD PRESSURE: 75 MMHG | SYSTOLIC BLOOD PRESSURE: 113 MMHG | RESPIRATION RATE: 18 BRPM

## 2023-12-29 PROCEDURE — 99238 HOSP IP/OBS DSCHRG MGMT 30/<: CPT

## 2023-12-29 PROCEDURE — 99283 EMERGENCY DEPT VISIT LOW MDM: CPT | Mod: GC

## 2023-12-29 RX ORDER — ACETAMINOPHEN 500 MG
1000 TABLET ORAL ONCE
Refills: 0 | Status: COMPLETED | OUTPATIENT
Start: 2023-12-29 | End: 2023-12-29

## 2023-12-29 RX ORDER — PANTOPRAZOLE SODIUM 20 MG/1
40 TABLET, DELAYED RELEASE ORAL ONCE
Refills: 0 | Status: COMPLETED | OUTPATIENT
Start: 2023-12-29 | End: 2023-12-29

## 2023-12-29 RX ORDER — FAMOTIDINE 10 MG/ML
20 INJECTION INTRAVENOUS ONCE
Refills: 0 | Status: COMPLETED | OUTPATIENT
Start: 2023-12-29 | End: 2023-12-29

## 2023-12-29 RX ORDER — SUCRALFATE 1 G
1 TABLET ORAL ONCE
Refills: 0 | Status: DISCONTINUED | OUTPATIENT
Start: 2023-12-29 | End: 2023-12-29

## 2023-12-29 RX ORDER — SUCRALFATE 1 G
1 TABLET ORAL ONCE
Refills: 0 | Status: COMPLETED | OUTPATIENT
Start: 2023-12-29 | End: 2023-12-29

## 2023-12-29 RX ORDER — SODIUM CHLORIDE 9 MG/ML
1000 INJECTION INTRAMUSCULAR; INTRAVENOUS; SUBCUTANEOUS
Refills: 0 | Status: DISCONTINUED | OUTPATIENT
Start: 2023-12-29 | End: 2024-01-01

## 2023-12-29 RX ORDER — PANTOPRAZOLE SODIUM 20 MG/1
1 TABLET, DELAYED RELEASE ORAL
Qty: 42 | Refills: 0
Start: 2023-12-29 | End: 2024-01-18

## 2023-12-29 RX ORDER — SUCRALFATE 1 G
1 TABLET ORAL
Qty: 84 | Refills: 0
Start: 2023-12-29 | End: 2024-01-18

## 2023-12-29 RX ADMIN — Medication 400 MILLIGRAM(S): at 02:16

## 2023-12-29 RX ADMIN — FAMOTIDINE 20 MILLIGRAM(S): 10 INJECTION INTRAVENOUS at 08:18

## 2023-12-29 RX ADMIN — Medication 1000 MILLIGRAM(S): at 07:11

## 2023-12-29 RX ADMIN — Medication 30 MILLILITER(S): at 08:19

## 2023-12-29 RX ADMIN — PANTOPRAZOLE SODIUM 40 MILLIGRAM(S): 20 TABLET, DELAYED RELEASE ORAL at 03:54

## 2023-12-29 RX ADMIN — Medication 1 GRAM(S): at 12:56

## 2023-12-29 RX ADMIN — SODIUM CHLORIDE 75 MILLILITER(S): 9 INJECTION INTRAMUSCULAR; INTRAVENOUS; SUBCUTANEOUS at 03:54

## 2023-12-29 NOTE — ED CDU PROVIDER DISPOSITION NOTE - NSPTACCESSSVCSAPPTDETAILS_ED_ALL_ED_FT
needs f/u for GERD needs f/u for GERD and outpatient studies needs f/u for GERD and outpatient studies  Dr. Gonzalez recommended by GI

## 2023-12-29 NOTE — ED CDU PROVIDER DISPOSITION NOTE - CARE PROVIDER_API CALL
Ethan Galaviz  Surgery  [address] [place] [address], [facility] [address]  [place] [address]  Phone: [phone]  Fax: [phone]  Follow Up Time:    Ethan Galaviz  Surgery  [address] [place] [address], [facility] [address]  [place] [address]  Phone: [phone]  Fax: [phone]  Follow Up Time:    Ethan Galaviz  Surgery  [address] [facility], [address]  [place] [address]  Phone: [phone]  Fax: [phone]  Follow Up Time:     Pj Vazquez  Gastroenterology  [address] [facility], [address] [place] [address]  Phone: [phone]  Fax: [phone]  Follow Up Time: 1-3 Days   Ethan Galaviz  Surgery  [address] [facility], [address]  [place] [address]  Phone: [phone]  Fax: [phone]  Follow Up Time:     Pj Vazquez  Gastroenterology  [address] [facility], [address] [place] [address]  Phone: [phone]  Fax: [phone]  Follow Up Time: 1-3 Days   Ethan Galaviz  Surgery  [address] [facility], [address]  [place] [address]  Phone: [phone]  Fax: [phone]  Follow Up Time:     Pj Vazquez  Gastroenterology  [address], [address] [place] [address]  Phone: [phone]  Fax: [phone]  Follow Up Time: 1-3 Days    Rosario Gonzalez  Gastroenterology  [address] [place] [address], [address]  [place] [address]  Phone: [phone]  Fax: [phone]  Follow Up Time: Urgent   Ethan Galaviz  Surgery  [address] [facility], [address]  [place] [address]  Phone: [phone]  Fax: [phone]  Follow Up Time:     Pj Vzaquez  Gastroenterology  [address], [address] [place] [address]  Phone: [phone]  Fax: [phone]  Follow Up Time: 1-3 Days    Rosario Gonzalez  Gastroenterology  [address] [place] [address], [address]  [place] [address]  Phone: [phone]  Fax: [phone]  Follow Up Time: Urgent

## 2023-12-29 NOTE — CONSULT NOTE ADULT - SUBJECTIVE AND OBJECTIVE BOX
GENERAL SURGERY CONSULT NOTE    HPI: 73F PMH GERD, HTN, HLD, PSH b/l hip replacements, s/p lap repair w/ recurrent hiatal hernia.  inability to eat that is progressively worsening over the last 4 weeks since laparoscopic repair of recurrent hiatal hernia. Follows with Dr. Galaviz. followed up and had an Upper GI series yesterday that was unremarkable. patient is without fever, diarrhea. patient contacted Dr. Westfall office who recommended she come. reports part of plan was endoscopy however she is still to close postoperatively so it has not been scheduled it. postoperatively patient is on famotidine. dexlansoprazole. patient also notes sore throat x 1 day. Not having abdominal pain currently, just burning at the bottom of her throat ascending upwards.    In the ED, patient was afebrile, VSS, CBC, BMP WNL        PMH/PSH: H/O seasonal allergies    History of pulmonary embolism    Restless leg syndrome    GERD (gastroesophageal reflux disease)    OA (osteoarthritis)    Fungal infection of skin    H/O scoliosis    Essential hypertension    Depression    Osteoporosis    Right hip pain    2019 novel coronavirus disease (COVID-19)    Hiatal hernia    History of chronic pain    Peripheral neuropathy    S/P repair of paraesophageal hernia    S/P spinal fusion    Scoliosis    S/P spinal surgery    Removal of pin, plate, abigail, or screw    S/P hysterectomy    S/P hip replacement    S/P shoulder surgery    S/P dilatation and curettage    H/O arthroscopy of knee    S/P cataract surgery    History of bilateral hip replacements        MEDS:sodium chloride 0.9%. 1000 milliLiter(s) (75 mL/Hr) IV Continuous <Continuous>      ALLERGIES: NKDA    REVIEW OF SYSTEMS: All ROS negative except as per HPI.  ____________________________________________    VITALS:T(C): 36.9, Max: 37.2 (12-28)  T(F): 98.4, Max: 99 (12-28)  HR: 84 (84 - 96)  BP: 116/64 (106/66 - 145/88)  BP(mean): --  ABP: --  ABP(mean): --  RR: 17 (16 - 18)  SpO2: 96% (94% - 98%)  CVP(mm Hg): --  CVP(cm H2O): --  room air          PHYSICAL EXAM:  General: AAOx3, no acute distress.  Respiratory: breathing comfortably, no increased WOB   Abdomen: soft, nontender, nondistended, no rebound, no guarding  Extremities: Moves all four.   ____________________________________________    LABS:                      12.5  7.31 )-----------( 289    ( 28 Dec 2023 17:35 )             40.1  138  |  99  |  20  ----------------------------<  110<H>    (12-28)  3.8   |  28  |  0.80          Ca    9.9      12-28  Mg    x  Phos  x        LIVER FUNCTIONS - ( 28 Dec 2023 17:35 )  Alb: 3.8 g/dL / Pro: 6.8 g/dL / ALK PHOS: 130 U/L / ALT: 29 U/L / AST: 18 U/L / GGT: x      PT/INR - ( 28 Dec 2023 17:35 )   PT: 10.8 sec;   INR: 1.03 ratio         PTT - ( 28 Dec 2023 17:35 )  PTT:28.8 sec  Urinalysis Basic - ( 28 Dec 2023 17:35 )    Color: x / Appearance: x / SG: x / pH: x  Gluc: 110 mg/dL / Ketone: x  / Bili: x / Urobili: x   Blood: x / Protein: x / Nitrite: x   Leuk Esterase: x / RBC: x / WBC x   Sq Epi: x / Non Sq Epi: x / Bacteria: x      ____________________________________________    RADIOLOGY & ADDITIONAL STUDIES:

## 2023-12-29 NOTE — CONSULT NOTE ADULT - ASSESSMENT
73F PMH GERD, HTN, HLD, PSH b/l hip replacements, s/p lap repair w/ recurrent hiatal hernia, here for continued reflux    PLAN  - No acute surgical intervention  - Recommend PPI, Pepcid, karofate  - Please follow-up with Dr Galaviz as outpatient  -  73F PMH GERD, HTN, HLD, PSH b/l hip replacements, s/p lap repair w/ recurrent hiatal hernia, here for continued reflux    PLAN  - No acute surgical intervention  - Recommend PPI, Pepcid, karofate  - Please follow-up with Dr Galaviz as outpatient    Discussed with Dr Ortega on behalf of Dr Anastacia Orellana [facility]  [id number]   73F PMH GERD, HTN, HLD, PSH b/l hip replacements, s/p lap repair w/ recurrent hiatal hernia, here for continued reflux    PLAN  - No acute surgical intervention  - Recommend PPI, Pepcid, karofate  - Please follow-up with Dr Galaviz as outpatient    Discussed with Dr Ortega on behalf of Dr Anastacia Orellana [facility]  [id number]

## 2023-12-29 NOTE — ED ADULT NURSE REASSESSMENT NOTE - NS ED NURSE REASSESS COMMENT FT1
Report received from RN Van Talbert. Pt A&O X4, oriented to CDU & plan of care was discussed. Pt in CDU for IV fluid and GI Consult. Pt c/o epigastric pain, IV pantoprazole given as ordered. denies any SOB, dizziness or palpitations. V/S stable, IV fluids infusing at 75ml/hour, IV site free of signs of infiltration. Safety & comfort measures maintained. Educated patient to call for assistance as needed. Call bell in reach. Will continue to monitor.

## 2023-12-29 NOTE — ED CDU PROVIDER INITIAL DAY NOTE - NS ED ATTENDING STATEMENT MOD
I have seen and examined this patient and fully participated in the care of this patient as the teaching attending.  The service was shared with the TAYE.  I reviewed and verified the documentation.

## 2023-12-29 NOTE — ED CDU PROVIDER DISPOSITION NOTE - ATTENDING APP SHARED VISIT CONTRIBUTION OF CARE
Attending MD Fernandes: I personally made/approved the management plan and take responsibility for the patient management.

## 2023-12-29 NOTE — ED CDU PROVIDER DISPOSITION NOTE - NSFOLLOWUPINSTRUCTIONS_ED_ALL_ED_FT
Please make sure to follow up with your primary care doctor within 1-2 days and with your GI specialist at [facility]. The information for follow up can be found below. Bring a copy of all of your results with you to your follow up appointments.   Return to the ER as discussed if you develop any new or worsening symptoms.     Make sure to adequately hydrate and take your medications as prescribed.    Antoine Escobar MD  Gastroenterology  [phone] Please make sure to follow up with your primary care doctor within 1-2 days and with your GI specialist at [facility]. The information for follow up can be found below. Bring a copy of all of your results with you to your follow up appointments.   Return to the ER as discussed if you develop any new or worsening symptoms.     Make sure to adequately hydrate and take your medications as prescribed.    Antoine Escobar MD  Gastroenterology  [phone] Please make sure to follow up with your primary care doctor within 1-2 days and with your SURGERY and with a GI specialist. The information for follow up can be found below.   Our ED referrals coordinator will call you with appointment details to see the GI specialist, if you do not hear from anyone please call [phone] for additional assistance.   Bring a copy of all of your results with you to your follow up appointments.   Return to the ER as discussed if you develop any new or worsening symptoms.     Make sure to adequately hydrate and take your medications as prescribed.    Ethan Galaviz  Surgery  [address] [place] [address], [address]  [place] [address]  Phone: [phone] Please make sure to follow up with your primary care doctor within 1-2 days and with your SURGERY and with a GI specialist. The information for follow up can be found below.   Our ED referrals coordinator will call you with appointment details to see the GI specialist, if you do not hear from anyone please call [phone] for additional assistance.   Bring a copy of all of your results with you to your follow up appointments.   Return to the ER as discussed if you develop any new or worsening symptoms.     Make sure to adequately hydrate and take your medications as prescribed.    Ethan Galaviz  Surgery  [address] [place] [address], [address]  [place] [address]  Phone: [phone] Make sure to adequately hydrate and take your medications as prescribed.  Take pantoprazole 20mg twice daily.   Take sucralfate 1g oral every 6 hours.     Please make sure to follow up with your primary care provider within 1-2 days and with your SURGEON Dr. MAHONEY and GASTROENTEROLOGIST upon discharge. The information for follow up can be found below.     Bring a copy of all of your results with you to your follow up appointments.   Return to the ER as discussed if you develop any new or worsening symptoms.       Ethan Mahoney  Surgery  [address] [place] [address], [address]  [place] [address]  Phone: [phone]    Pj Vazquez  Gastroenterology  [address] [place] [address]  Phone: [phone] Make sure to adequately hydrate and take your medications as prescribed.  Take pantoprazole 20mg twice daily.   Take sucralfate 1g oral every 6 hours.     Please make sure to follow up with your primary care provider within 1-2 days and with your SURGEON Dr. MAHONEY and GASTROENTEROLOGIST upon discharge. The information for follow up can be found below.     Bring a copy of all of your results with you to your follow up appointments.   Return to the ER as discussed if you develop any new or worsening symptoms.       Ethan Mahoney  Surgery  [address] [place] [address], [address]  [place] [address]  Phone: [phone]    Pj Vazquez  Gastroenterology  [address] [place] [address]  Phone: [phone] Make sure to adequately hydrate and take your medications as prescribed.  Take pantoprazole 20mg twice daily before meals.   Take sucralfate 1g oral every 6 hours.     Please make sure to follow up with your primary care provider within 1-2 days and with your SURGEON Dr. MAHONEY and GASTROENTEROLOGIST upon discharge. The information for follow up can be found below.     Bring a copy of all of your results with you to your follow up appointments.   Return to the ER as discussed if you develop any new or worsening symptoms including worsening pain or unable to eat or drink, or any other concerns.       Ethan Mahoney  Surgery  [address] [facility], [address]  [place] [address]  Phone: [phone]    Rosario Gonzalez  Gastroenterology  [address] [place] [address] [address]  [place] [address]  Phone: [phone]    Pj Vazquez  Gastroenterology  [address] [address]  Phone: [phone] Make sure to adequately hydrate and take your medications as prescribed.  Take pantoprazole 20mg twice daily before meals.   Take sucralfate 1g oral every 6 hours.     Please make sure to follow up with your primary care provider within 1-2 days and with your SURGEON Dr. MAHONEY and GASTROENTEROLOGIST upon discharge. The information for follow up can be found below.     Bring a copy of all of your results with you to your follow up appointments.   Return to the ER as discussed if you develop any new or worsening symptoms including worsening pain or unable to eat or drink, or any other concerns.       Ethan Mahoney  Surgery  [address] [facility], [address]  [place] [address]  Phone: [phone]    Rosario Gonzalez  Gastroenterology  [address] [place] [address] [address]  [place] [address]  Phone: [phone]    jP Vazquez  Gastroenterology  [address] [address]  Phone: [phone]

## 2023-12-29 NOTE — CONSULT NOTE ADULT - ATTENDING COMMENTS
Agree with patient.  Reviewed patient's prior endoscopy and colonoscopy reports and all scripts from October of this year.  Patient had recent hiatal hernia and gastropexy repair, and since then reports burning which she attributes to new acid reflux.  He reports coughing before and after surgery, but no acid reflux or burning before the surgery.  No dysphagia, no odynophagia.  She has been on PPI.  Would recommend continuing PPI twice daily before meals.  Add liquid Carafate suspension every 8 6 hours.  If able to control symptoms, will recommend outpatient follow-up with Dr. Peres to consider pH test testing with pH impedance/manometry and/or repeat endoscopy to confirm reflux and to evaluate for potential functional symptoms. Agree with patient.  Reviewed patient's prior endoscopy and colonoscopy reports and all scripts from October of this year.  Esophagram from this week done outpatient is unremarkable. Patient had recent hiatal hernia and gastropexy repair, and since then reports burning which she attributes to new acid reflux.  He reports coughing before and after surgery, but no acid reflux or burning before the surgery.  No dysphagia, no odynophagia.  She has been on PPI.  Would recommend continuing PPI twice daily before meals.  Add liquid Carafate suspension every 8 6 hours.  If able to control symptoms, will recommend outpatient follow-up with Dr. Peres to consider pH test testing with pH impedance/manometry and/or repeat endoscopy to confirm reflux and to evaluate for potential functional symptoms.

## 2023-12-29 NOTE — ED CDU PROVIDER DISPOSITION NOTE - PROVIDER TOKENS
PROVIDER:[TOKEN:[[id number]]] PROVIDER:[TOKEN:[[id number]]] PROVIDER:[TOKEN:[[id number]]],PROVIDER:[TOKEN:[[id number]],FOLLOWUP:[1-3 Days]] PROVIDER:[TOKEN:[[id number]]],PROVIDER:[TOKEN:[[id number]],FOLLOWUP:[1-3 Days]] PROVIDER:[TOKEN:[[id number]]],PROVIDER:[TOKEN:[[id number]],FOLLOWUP:[1-3 Days]],PROVIDER:[TOKEN:[[id number]],FOLLOWUP:[Urgent]] PROVIDER:[TOKEN:[[id number]]],PROVIDER:[TOKEN:[[id number]],FOLLOWUP:[1-3 Days]],PROVIDER:[TOKEN:[[id number]],FOLLOWUP:[Urgent]]

## 2023-12-29 NOTE — ED CDU PROVIDER INITIAL DAY NOTE - OBJECTIVE STATEMENT
74 yo F with a PMH of HTN, PE after surgery not on AC, neuropathy, GERD p/w inability to tolerate PO and a burning sensation in her throat that is progressively worsening over the last 4 weeks since laparoscopic repair of recurrent hiatal hernia with GI, Dr. Galaviz at [facility]. Has since followed up with GI and had an Upper GI series yesterday that was unremarkable. Given persistent sxs pt contacted Dr. Westfall office and they recommended she come to ED. Is planned for endoscopy at some point in the near future. Since hiatal hernia repair pt is on famotidine and dexlansoprazole which she was taking w/o relief but cannot tolerate meds at this point either. Denies nausea, vomiting, fever, chills, chest pain, SOB, cough, abd pain or distension, diarrhea, dysuria, headache, dizziness, syncope. 72 yo F with a PMH of HTN, PE after surgery not on AC, neuropathy, GERD p/w inability to tolerate PO and a burning sensation in her throat that is progressively worsening over the last 4 weeks since laparoscopic repair of recurrent hiatal hernia with GI, Dr. Galaviz at [facility]. Has since followed up with GI and had an Upper GI series yesterday that was unremarkable. Given persistent sxs pt contacted Dr. Westfall office and they recommended she come to ED. Is planned for endoscopy at some point in the near future. Since hiatal hernia repair pt is on famotidine and dexlansoprazole which she was taking w/o relief but cannot tolerate meds at this point either. Denies nausea, vomiting, fever, chills, chest pain, SOB, cough, abd pain or distension, diarrhea, dysuria, headache, dizziness, syncope. 72 yo F with a PMH of HTN, PE after surgery not on AC, neuropathy, GERD p/w inability to tolerate PO and a burning sensation in her throat that is progressively worsening over the last 4 weeks since laparoscopic repair of recurrent hiatal hernia with Dr. Ethan Galaviz. Has since followed up and had an Upper GI series yesterday that was unremarkable. Given persistent sxs pt contacted Dr. Westfall office and they recommended she come to ED. Is planned for endoscopy at some point in the near future. Since hiatal hernia repair pt is on famotidine and dexlansoprazole which she was taking w/o relief but cannot tolerate meds at this point either. Denies nausea, vomiting, fever, chills, chest pain, SOB, cough, abd pain or distension, diarrhea, dysuria, headache, dizziness, syncope.

## 2023-12-29 NOTE — CONSULT NOTE ADULT - SUBJECTIVE AND OBJECTIVE BOX
HPI:  VIRGINIA HUFFMAN is a 73 year old female with history of HTN, HLD, chronic GERD/LPR, recent laparoscopic hiatal hernia repair and fundoplication [11/30/2023] who presents with burning throat pain.    Patient follows with GI physician Dr. Gonzalez for chronic GERD/LPR for which she was previously taking PPI BID with famotidine at night with control of symptoms.  She underwent laparoscopic repair of hiatal hernia with fundoplication on 11/30/2023.  She states she was feeling well after surgery, until this week she developed worsening burning throat pain that makes it painful/difficult to swallow.  She denies regurgitation, nausea, vomiting, or dysphagia to solids or liquids.  She was switched from her omperazole to dexlansoprazole 3 days prior to presentation to [facility] ED without improvement.  Surgeon Dr. Galaviz recommended coming into ED for CT imaging.      ROS:   General:  No fevers, chills, night sweats, fatigue  Eyes:  Good vision, no reported pain  ENT:  No sore throat, pain, runny nose  CV:  No pain, palpitations  Pulm:  No dyspnea, cough  GI:  See HPI, otherwise negative  :  No  incontinence, nocturia  Muscle:  No pain, weakness  Neuro:  No memory problems  Psych:  No insomnia, mood problems, depression  Endocrine:  No polyuria, polydipsia, cold/heat intolerance  Heme:  No petechiae, ecchymosis, easy bruisability  Skin:  No rash    PMHX/PSHX:    H/O seasonal allergies    History of pulmonary embolism    Restless leg syndrome    GERD (gastroesophageal reflux disease)    HTN (hypertension)    OA (osteoarthritis)    Fungal infection of skin    H/O scoliosis    Essential hypertension    Depression    Osteoporosis    Right hip pain    2019 novel coronavirus disease (COVID-19)    Hiatal hernia    History of chronic pain    Peripheral neuropathy    S/P repair of paraesophageal hernia    S/P spinal fusion    Scoliosis    S/P spinal surgery    Removal of pin, plate, abigail, or screw    S/P hysterectomy    S/P hip replacement    S/P shoulder surgery    S/P dilatation and curettage    H/O arthroscopy of knee    H/O total knee replacement, right    S/P cataract surgery    History of bilateral hip replacements      Allergies:  erythromycin (Stomach Upset; Vomiting; Nausea)  Dilantin (Urticaria)  penicillins (Urticaria)      Home Medications: reviewed  Hospital Medications:  sodium chloride 0.9%. 1000 milliLiter(s) IV Continuous <Continuous>      Social History:   Tobacco: denies  Alcohol: denies  Recreational drugs: denies    Family history:      Denies family history of colon cancer/polyps, stomach cancer/polyps, pancreatic cancer/masses, liver cancer/disease, ovarian cancer and endometrial cancer.    PHYSICAL EXAM:   Vital Signs:  Vital Signs Last 24 Hrs  T(C): 36.9 (29 Dec 2023 07:38), Max: 37.2 (28 Dec 2023 15:29)  T(F): 98.4 (29 Dec 2023 07:38), Max: 99 (28 Dec 2023 15:29)  HR: 84 (29 Dec 2023 07:38) (84 - 96)  BP: 116/64 (29 Dec 2023 07:38) (106/66 - 145/88)  BP(mean): --  RR: 17 (29 Dec 2023 07:38) (16 - 18)  SpO2: 96% (29 Dec 2023 07:38) (94% - 98%)    Parameters below as of 29 Dec 2023 07:38  Patient On (Oxygen Delivery Method): room air      Daily Height in cm: 157.48 (28 Dec 2023 14:11)    Daily     GENERAL: no acute distress  NEURO: alert  HEENT: NCAT, no conjunctival pallor appreciated  CHEST: no respiratory distress, no accessory muscle use  CARDIAC: regular rate, +S1/S2  ABDOMEN: soft, nontender, no rebound or guarding  EXTREMITIES: warm, well perfused  SKIN: no lesions noted    LABS: reviewed                        12.5   7.31  )-----------( 289      ( 28 Dec 2023 17:35 )             40.1     12-28    138  |  99  |  20  ----------------------------<  110<H>  3.8   |  28  |  0.80    Ca    9.9      28 Dec 2023 17:35    TPro  6.8  /  Alb  3.8  /  TBili  0.5  /  DBili  x   /  AST  18  /  ALT  29  /  AlkPhos  130<H>  12-28    LIVER FUNCTIONS - ( 28 Dec 2023 17:35 )  Alb: 3.8 g/dL / Pro: 6.8 g/dL / ALK PHOS: 130 U/L / ALT: 29 U/L / AST: 18 U/L / GGT: x               Diagnostic Studies: see sunrise for full report         HPI:  VIRGINIA HUFFMAN is a 73 year old female with history of HTN, HLD, chronic GERD/LPR, recent laparoscopic hiatal hernia repair and fundoplication [11/30/2023] who presents with burning throat pain.    Patient follows with GI physician Dr. Gonzalez for chronic GERD/LPR for which she was previously taking PPI BID with famotidine at night with control of symptoms.  She underwent laparoscopic repair of hiatal hernia with fundoplication on 11/30/2023.  She states she was feeling well after surgery, until this week she developed worsening burning throat pain that makes it painful/difficult to swallow.  She denies regurgitation, nausea, vomiting, or dysphagia to solids or liquids.  She was switched from her omperazole to dexlansoprazole 3 days prior to presentation to [facility] ED without improvement.  Surgeon Dr. Galaviz recommended coming into ED for CT imaging.      ROS:   General:  No fevers, chills, night sweats, fatigue  Eyes:  Good vision, no reported pain  ENT:  No sore throat, pain, runny nose  CV:  No pain, palpitations  Pulm:  No dyspnea, cough  GI:  See HPI, otherwise negative  :  No  incontinence, nocturia  Muscle:  No pain, weakness  Neuro:  No memory problems  Psych:  No insomnia, mood problems, depression  Endocrine:  No polyuria, polydipsia, cold/heat intolerance  Heme:  No petechiae, ecchymosis, easy bruisability  Skin:  No rash    PMHX/PSHX:    H/O seasonal allergies    History of pulmonary embolism    Restless leg syndrome    GERD (gastroesophageal reflux disease)    HTN (hypertension)    OA (osteoarthritis)    Fungal infection of skin    H/O scoliosis    Essential hypertension    Depression    Osteoporosis    Right hip pain    2019 novel coronavirus disease (COVID-19)    Hiatal hernia    History of chronic pain    Peripheral neuropathy    S/P repair of paraesophageal hernia    S/P spinal fusion    Scoliosis    S/P spinal surgery    Removal of pin, plate, abigail, or screw    S/P hysterectomy    S/P hip replacement    S/P shoulder surgery    S/P dilatation and curettage    H/O arthroscopy of knee    H/O total knee replacement, right    S/P cataract surgery    History of bilateral hip replacements      Allergies:  erythromycin (Stomach Upset; Vomiting; Nausea)  Dilantin (Urticaria)  penicillins (Urticaria)      Home Medications: reviewed  Hospital Medications:  sodium chloride 0.9%. 1000 milliLiter(s) IV Continuous <Continuous>      Social History:   Tobacco: denies  Alcohol: denies  Recreational drugs: denies    Family history:      Denies family history of colon cancer/polyps, stomach cancer/polyps, pancreatic cancer/masses, liver cancer/disease, ovarian cancer and endometrial cancer.    PHYSICAL EXAM:   Vital Signs:  Vital Signs Last 24 Hrs  T(C): 36.9 (29 Dec 2023 07:38), Max: 37.2 (28 Dec 2023 15:29)  T(F): 98.4 (29 Dec 2023 07:38), Max: 99 (28 Dec 2023 15:29)  HR: 84 (29 Dec 2023 07:38) (84 - 96)  BP: 116/64 (29 Dec 2023 07:38) (106/66 - 145/88)  BP(mean): --  RR: 17 (29 Dec 2023 07:38) (16 - 18)  SpO2: 96% (29 Dec 2023 07:38) (94% - 98%)    Parameters below as of 29 Dec 2023 07:38  Patient On (Oxygen Delivery Method): room air      Daily Height in cm: 157.48 (28 Dec 2023 14:11)    Daily     GENERAL: no acute distress  NEURO: alert  HEENT: NCAT, no conjunctival pallor appreciated  CHEST: no respiratory distress, no accessory muscle use  CARDIAC: regular rate, +S1/S2  ABDOMEN: soft, nontender, no rebound or guarding  EXTREMITIES: warm, well perfused  SKIN: no lesions noted    LABS: reviewed                        12.5   7.31  )-----------( 289      ( 28 Dec 2023 17:35 )             40.1     12-28    138  |  99  |  20  ----------------------------<  110<H>  3.8   |  28  |  0.80    Ca    9.9      28 Dec 2023 17:35    TPro  6.8  /  Alb  3.8  /  TBili  0.5  /  DBili  x   /  AST  18  /  ALT  29  /  AlkPhos  130<H>  12-28    LIVER FUNCTIONS - ( 28 Dec 2023 17:35 )  Alb: 3.8 g/dL / Pro: 6.8 g/dL / ALK PHOS: 130 U/L / ALT: 29 U/L / AST: 18 U/L / GGT: x               Diagnostic Studies: see sunrise for full report         HPI:  VIRGINIA HUFFMAN is a 73 year old female with history of HTN, HLD, chronic GERD/LPR, recent laparoscopic hiatal hernia repair and fundoplication [11/30/2023] who presents with burning throat pain.    Patient follows with GI physician Dr. Gonzalez for chronic GERD/LPR for which she was previously taking PPI BID with famotidine at night with control of symptoms.  She underwent laparoscopic repair of hiatal hernia with fundoplication on 11/30/2023.  She states she was feeling well after surgery, until this week she developed worsening burning throat pain that makes it painful/difficult to swallow.  She denies regurgitation, nausea, vomiting, or dysphagia to solids or liquids.  She was switched from her omperazole to dexlansoprazole 3 days prior to presentation to [facility] ED without improvement.  Surgeon Dr. Galaviz recommended coming into ED for CT imaging.  No prior pH or manometry testing.     ROS:   General:  No fevers, chills, night sweats, fatigue  Eyes:  Good vision, no reported pain  ENT:  No sore throat, pain, runny nose  CV:  No pain, palpitations  Pulm:  No dyspnea, cough  GI:  See HPI, otherwise negative  :  No  incontinence, nocturia  Muscle:  No pain, weakness  Neuro:  No memory problems  Psych:  No insomnia, mood problems, depression  Endocrine:  No polyuria, polydipsia, cold/heat intolerance  Heme:  No petechiae, ecchymosis, easy bruisability  Skin:  No rash    PMHX/PSHX:    H/O seasonal allergies    History of pulmonary embolism    Restless leg syndrome    GERD (gastroesophageal reflux disease)    HTN (hypertension)    OA (osteoarthritis)    Fungal infection of skin    H/O scoliosis    Essential hypertension    Depression    Osteoporosis    Right hip pain    2019 novel coronavirus disease (COVID-19)    Hiatal hernia    History of chronic pain    Peripheral neuropathy    S/P repair of paraesophageal hernia    S/P spinal fusion    Scoliosis    S/P spinal surgery    Removal of pin, plate, abigail, or screw    S/P hysterectomy    S/P hip replacement    S/P shoulder surgery    S/P dilatation and curettage    H/O arthroscopy of knee    H/O total knee replacement, right    S/P cataract surgery    History of bilateral hip replacements      Allergies:  erythromycin (Stomach Upset; Vomiting; Nausea)  Dilantin (Urticaria)  penicillins (Urticaria)      Home Medications: reviewed  Hospital Medications:  sodium chloride 0.9%. 1000 milliLiter(s) IV Continuous <Continuous>      Social History:   Tobacco: denies  Alcohol: denies  Recreational drugs: denies    Family history:      Denies family history of colon cancer/polyps, stomach cancer/polyps, pancreatic cancer/masses, liver cancer/disease, ovarian cancer and endometrial cancer.    PHYSICAL EXAM:   Vital Signs:  Vital Signs Last 24 Hrs  T(C): 36.9 (29 Dec 2023 07:38), Max: 37.2 (28 Dec 2023 15:29)  T(F): 98.4 (29 Dec 2023 07:38), Max: 99 (28 Dec 2023 15:29)  HR: 84 (29 Dec 2023 07:38) (84 - 96)  BP: 116/64 (29 Dec 2023 07:38) (106/66 - 145/88)  BP(mean): --  RR: 17 (29 Dec 2023 07:38) (16 - 18)  SpO2: 96% (29 Dec 2023 07:38) (94% - 98%)    Parameters below as of 29 Dec 2023 07:38  Patient On (Oxygen Delivery Method): room air      Daily Height in cm: 157.48 (28 Dec 2023 14:11)    Daily     GENERAL: no acute distress  NEURO: alert  HEENT: NCAT, no conjunctival pallor appreciated  CHEST: no respiratory distress, no accessory muscle use  CARDIAC: regular rate, +S1/S2  ABDOMEN: soft, nontender, no rebound or guarding  EXTREMITIES: warm, well perfused  SKIN: no lesions noted    LABS: reviewed                        12.5   7.31  )-----------( 289      ( 28 Dec 2023 17:35 )             40.1     12-28    138  |  99  |  20  ----------------------------<  110<H>  3.8   |  28  |  0.80    Ca    9.9      28 Dec 2023 17:35    TPro  6.8  /  Alb  3.8  /  TBili  0.5  /  DBili  x   /  AST  18  /  ALT  29  /  AlkPhos  130<H>  12-28    LIVER FUNCTIONS - ( 28 Dec 2023 17:35 )  Alb: 3.8 g/dL / Pro: 6.8 g/dL / ALK PHOS: 130 U/L / ALT: 29 U/L / AST: 18 U/L / GGT: x               Diagnostic Studies: see sunrise for full report         HPI:  VIRGINIA HUFFMAN is a 73 year old female with history of HTN, HLD, chronic GERD/LPR, recent laparoscopic hiatal hernia repair and fundoplication [11/30/2023] who presents with burning throat pain.    Patient follows with GI physician Dr. Gonzalez for chronic GERD/LPR for which she was previously taking PPI BID with famotidine at night with control of symptoms.  She underwent laparoscopic repair of hiatal hernia with fundoplication on 11/30/2023.  She states she was feeling well after surgery, until this week she developed worsening burning throat pain that makes it painful/difficult to swallow.  She denies regurgitation, nausea, vomiting, or dysphagia to solids or liquids.  She was switched from her omperazole to dexlansoprazole 3 days prior to presentation to [facility] ED without improvement.  Surgeon Dr. Galaviz recommended coming into ED for CT imaging.  No prior pH or manometry testing.     ROS:   General:  No fevers, chills, night sweats, fatigue  Eyes:  Good vision, no reported pain  ENT:  No sore throat, pain, runny nose  CV:  No pain, palpitations  Pulm:  No dyspnea, cough  GI:  See HPI, otherwise negative  :  No  incontinence, nocturia  Muscle:  No pain, weakness  Neuro:  No memory problems  Psych:  No insomnia, mood problems, depression  Endocrine:  No polyuria, polydipsia, cold/heat intolerance  Heme:  No petechiae, ecchymosis, easy bruisability  Skin:  No rash    PMHX/PSHX:    H/O seasonal allergies    History of pulmonary embolism    Restless leg syndrome    GERD (gastroesophageal reflux disease)    HTN (hypertension)    OA (osteoarthritis)    Fungal infection of skin    H/O scoliosis    Essential hypertension    Depression    Osteoporosis    Right hip pain    2019 novel coronavirus disease (COVID-19)    Hiatal hernia    History of chronic pain    Peripheral neuropathy    S/P repair of paraesophageal hernia    S/P spinal fusion    Scoliosis    S/P spinal surgery    Removal of pin, plate, abigail, or screw    S/P hysterectomy    S/P hip replacement    S/P shoulder surgery    S/P dilatation and curettage    H/O arthroscopy of knee    H/O total knee replacement, right    S/P cataract surgery    History of bilateral hip replacements      Allergies:  erythromycin (Stomach Upset; Vomiting; Nausea)  Dilantin (Urticaria)  penicillins (Urticaria)      Home Medications: reviewed  Hospital Medications:  sodium chloride 0.9%. 1000 milliLiter(s) IV Continuous <Continuous>      Social History:   Tobacco: denies  Alcohol: denies  Recreational drugs: denies    Family history:      Denies family history of colon cancer/polyps, stomach cancer/polyps, pancreatic cancer/masses, liver cancer/disease, ovarian cancer and endometrial cancer.    PHYSICAL EXAM:   Vital Signs:  Vital Signs Last 24 Hrs  T(C): 36.9 (29 Dec 2023 07:38), Max: 37.2 (28 Dec 2023 15:29)  T(F): 98.4 (29 Dec 2023 07:38), Max: 99 (28 Dec 2023 15:29)  HR: 84 (29 Dec 2023 07:38) (84 - 96)  BP: 116/64 (29 Dec 2023 07:38) (106/66 - 145/88)  BP(mean): --  RR: 17 (29 Dec 2023 07:38) (16 - 18)  SpO2: 96% (29 Dec 2023 07:38) (94% - 98%)    Parameters below as of 29 Dec 2023 07:38  Patient On (Oxygen Delivery Method): room air      Daily Height in cm: 157.48 (28 Dec 2023 14:11)    Daily     GENERAL: no acute distress  NEURO: alert  HEENT: NCAT, no conjunctival pallor appreciated  CHEST: no respiratory distress, no accessory muscle use  CARDIAC: regular rate, +S1/S2  ABDOMEN: soft, nontender, no rebound or guarding  EXTREMITIES: warm, well perfused  SKIN: no lesions noted    LABS: reviewed                        12.5   7.31  )-----------( 289      ( 28 Dec 2023 17:35 )             40.1     12-28    138  |  99  |  20  ----------------------------<  110<H>  3.8   |  28  |  0.80    Ca    9.9      28 Dec 2023 17:35    TPro  6.8  /  Alb  3.8  /  TBili  0.5  /  DBili  x   /  AST  18  /  ALT  29  /  AlkPhos  130<H>  12-28    LIVER FUNCTIONS - ( 28 Dec 2023 17:35 )  Alb: 3.8 g/dL / Pro: 6.8 g/dL / ALK PHOS: 130 U/L / ALT: 29 U/L / AST: 18 U/L / GGT: x               Diagnostic Studies: see sunrise for full report

## 2023-12-29 NOTE — ED CDU PROVIDER DISPOSITION NOTE - PATIENT PORTAL LINK FT
You can access the FollowMyHealth Patient Portal offered by [facility] by registering at the following website: [email or web address]. By joining [facility]’s FollowMyHealth portal, you will also be able to view your health information using other applications (apps) compatible with our system. You can access the FollowMyHealth Patient Portal offered by [facility] by registering at the following website: [email or web address]. By joining [facility]’s FollowMyHealth portal, you will also be able to view your health information using other applications (apps) compatible with our system.

## 2023-12-29 NOTE — ED CDU PROVIDER INITIAL DAY NOTE - CLINICAL SUMMARY MEDICAL DECISION MAKING FREE TEXT BOX
73-year-old female with multiple medical issues, presented to emergency room with epigastric abdominal pain nausea vomiting and inability to keep anything by mouth , status post laparoscopy repair of hiatal hernia recently and she was sent to emergency room for possible bowel obstruction she has no fever no chills    physical exam is mild epigastric tenderness, no evidence of acute abdomen, 73-year-old female with multiple medical issues, presented to emergency room with epigastric abdominal pain nausea vomiting and inability to keep anything by mouth , status post laparoscopy repair of hiatal hernia recently and she was sent to emergency room for possible bowel obstruction she has no fever no chills   last week had negative esophagram outpatient   physical exam is mild epigastric tenderness, no evidence of acute abdomen, CAT scan no evidence of bowel obstruction, possible esophagitis,   seen by surgery in the ER, no surgical intervention at present time   will admit to CDU for IV hydration, IV PPI, GI consultation, and reassess tomorrow morning,ZR

## 2023-12-29 NOTE — CONSULT NOTE ADULT - ASSESSMENT
73 year old female with history of HTN, HLD, chronic GERD/LPR, recent laparoscopic hiatal hernia repair and fundoplication [11/30/2023] who presents with burning throat pain.  Patient follows with GI physician Dr. Gonzalez for chronic GERD/LPR for which she was previously taking PPI BID with famotidine at night with control of symptoms.  She underwent laparoscopic repair of hiatal hernia with fundoplication on 11/30/2023.  She states she was feeling well after surgery, until this week she developed worsening burning throat pain that makes it painful/difficult to swallow.  She denies regurgitation, nausea, vomiting, or dysphagia to solids or liquids.  She was switched from her omperazole to dexlansoprazole 3 days prior to presentation to [facility] ED without improvement.  Surgeon Dr. Galaviz recommended coming into ED for CT imaging.      # Burning throat pain  -negative UGIS for obstructing esophageal lesions  -unclear if worsening burning sensation in throat is solely due to GERD.  Other etiologies to consider would be functional heartburn, esophageal dysmotility, ineffective esophageal motility, or EGJOO.    Recommendations:  -PPI BID  -sucralfate 1g oral suspension q6h  -appreciate recommendations from surgeon Dr. Parra, please delineate when the team would be comfortable pursuing endoscopy, although EGD by itself will likely be low yield  -likely will need EGD, Bravo, +/- esophageal manometry/EndoFLIP as outpatient    Note incomplete until finalized by attending signature/attestation.    Nick Russo  GI/Hepatology Fellow    MONDAY-FRIDAY 8AM-5PM:  Pager# [phone] ([facility]) or [phone] ([facility])    NON-URGENT CONSULTS:  Please email [email or web address]  AT NIGHT AND ON WEEKENDS:  Contact on-call GI fellow via answering service ([phone]) from 5pm-8am and on weekends/holidays   73 year old female with history of HTN, HLD, chronic GERD/LPR, recent laparoscopic hiatal hernia repair and fundoplication [11/30/2023] who presents with burning throat pain.  Patient follows with GI physician Dr. Gonzalez for chronic GERD/LPR for which she was previously taking PPI BID with famotidine at night with control of symptoms.  She underwent laparoscopic repair of hiatal hernia with fundoplication on 11/30/2023.  She states she was feeling well after surgery, until this week she developed worsening burning throat pain that makes it painful/difficult to swallow.  She denies regurgitation, nausea, vomiting, or dysphagia to solids or liquids.  She was switched from her omperazole to dexlansoprazole 3 days prior to presentation to [facility] ED without improvement.  Surgeon Dr. Galaviz recommended coming into ED for CT imaging.      # Burning throat pain  -negative UGIS for obstructing esophageal lesions  -unclear if worsening burning sensation in throat is solely due to GERD.  Other etiologies to consider would be functional heartburn, esophageal dysmotility, ineffective esophageal motility, or EGJOO.    Recommendations:  -PPI BID  -sucralfate 1g oral suspension q6h  -appreciate recommendations from surgeon Dr. Parra, please delineate when the team would be comfortable pursuing endoscopy, although EGD by itself will likely be low yield  -likely will need EGD, Bravo, +/- esophageal manometry/EndoFLIP as outpatient    Note incomplete until finalized by attending signature/attestation.    Nick Russo  GI/Hepatology Fellow    MONDAY-FRIDAY 8AM-5PM:  Pager# [phone] ([facility]) or [phone] ([facility])    NON-URGENT CONSULTS:  Please email [email or web address]  AT NIGHT AND ON WEEKENDS:  Contact on-call GI fellow via answering service ([phone]) from 5pm-8am and on weekends/holidays

## 2023-12-29 NOTE — ED CDU PROVIDER SUBSEQUENT DAY NOTE - HISTORY
Patient seen at bedside in NAD.  VSS.  Still c/o intermittent burning in the back of her throat and cough from what she says is her throat being irritated. PPI just given. GI cs placed. Martin Ruiz PA-C 32-year-old female with history of hypertension diabetes hypothyroidism chronic constipation autism presents with intermittent abdominal discomfort today patient is grabbing at abdomen patient's last bowel movement was 3 days ago aide at bedside is reporting that the patient has passed flatus. Patient's aide denies any vaginal discharge or urinary symptoms. Aide states that patient has urged to go to the bathroom but has been straining.

## 2023-12-29 NOTE — ED CDU PROVIDER DISPOSITION NOTE - CARE PROVIDERS DIRECT ADDRESSES
,[email or web address] ,[email or web address] ,[email or web address]

## 2023-12-29 NOTE — ED CDU PROVIDER SUBSEQUENT DAY NOTE - PROGRESS NOTE DETAILS
Patient seen and evaluated at bedside, resting comfortably, NAD. Reports persistent burning epigastric pain that radiates to her throat. Reports unable to tolerate PO and previous medications did not provide relief. VSS. Pt with mild epigastric ttp on exam, throat clear, uvula midline. Pending GI c/s. Also called surgery team to see patient as patient reports she was advised to come to ER by surgery office. Patient seen by surgery, no surgical intervention needed, cleared from surgery standpoint. Pending final GI recs. Spoke with GI fellow Dr. Russo, reports no further inpatient testing from GI perspective, recommending PPI BID and sucralfate 1g Q6, outpatient GI f/u. Pt tolerating broth and crackers in CDU, reports she is comfortable going home. Will give outpatient GI f/u and send rx for PPI and sucralfate. Pt prefers sucralfate tab over suspension. D/c d/w Dr. Fernandes.

## 2023-12-29 NOTE — ED CDU PROVIDER DISPOSITION NOTE - CLINICAL COURSE
74 yo F with a PMH of HTN, PE after surgery not on AC, neuropathy, GERD p/w inability to tolerate PO and a burning sensation in her throat that is progressively worsening over the last 4 weeks since laparoscopic repair of recurrent hiatal hernia with GI, Dr. Galaviz at [facility]. Has since followed up with GI and had an Upper GI series yesterday that was unremarkable. Given persistent sxs pt contacted Dr. Westfall office and they recommended she come to ED. Is planned for endoscopy at some point in the near future. Since hiatal hernia repair pt is on famotidine and dexlansoprazole which she was taking w/o relief but cannot tolerate meds at this point either. Denies nausea, vomiting, fever, chills, chest pain, SOB, cough, abd pain or distension, diarrhea, dysuria, headache, dizziness, syncope.  In the ED, pt with stable VS. Labs non actionable. Pt refused CT AP as she just had upper GI series yesterday. GI cs placed. Pt was given IVF bolus and pepcid in ED w/o relief of sxs. Plan to place pt in CDU for cntd management including symptomatic relief and GI eval.  In the CDU*** 72 yo F with a PMH of HTN, PE after surgery not on AC, neuropathy, GERD p/w inability to tolerate PO and a burning sensation in her throat that is progressively worsening over the last 4 weeks since laparoscopic repair of recurrent hiatal hernia with GI, Dr. Galaviz at [facility]. Has since followed up with GI and had an Upper GI series yesterday that was unremarkable. Given persistent sxs pt contacted Dr. Westfall office and they recommended she come to ED. Is planned for endoscopy at some point in the near future. Since hiatal hernia repair pt is on famotidine and dexlansoprazole which she was taking w/o relief but cannot tolerate meds at this point either. Denies nausea, vomiting, fever, chills, chest pain, SOB, cough, abd pain or distension, diarrhea, dysuria, headache, dizziness, syncope.  In the ED, pt with stable VS. Labs non actionable. Pt refused CT AP as she just had upper GI series yesterday. GI cs placed. Pt was given IVF bolus and pepcid in ED w/o relief of sxs. Plan to place pt in CDU for cntd management including symptomatic relief and GI eval.  In the CDU*** 72 yo F with a PMH of HTN, PE after surgery not on AC, neuropathy, GERD p/w inability to tolerate PO and a burning sensation in her throat that is progressively worsening over the last 4 weeks since laparoscopic repair of recurrent hiatal hernia with Dr. Ethan Galaviz. Has since followed up and had an Upper GI series yesterday that was unremarkable. Given persistent sxs pt contacted Dr. Westfall office and they recommended she come to ED. Is planned for endoscopy at some point in the near future. Since hiatal hernia repair pt is on famotidine and dexlansoprazole which she was taking w/o relief but cannot tolerate meds at this point either. Denies nausea, vomiting, fever, chills, chest pain, SOB, cough, abd pain or distension, diarrhea, dysuria, headache, dizziness, syncope.  In the ED, pt with stable VS. Labs non actionable. Pt refused CT AP as she just had upper GI series yesterday. GI cs placed. Pt was given IVF bolus and pepcid in ED w/o relief of sxs. Plan to place pt in CDU for cntd management including symptomatic relief and GI eval.  In the CDU*** 74 yo F with a PMH of HTN, PE after surgery not on AC, neuropathy, GERD p/w inability to tolerate PO and a burning sensation in her throat that is progressively worsening over the last 4 weeks since laparoscopic repair of recurrent hiatal hernia with Dr. Ethan Galaviz. Has since followed up and had an Upper GI series yesterday that was unremarkable. Given persistent sxs pt contacted Dr. Westfall office and they recommended she come to ED. Is planned for endoscopy at some point in the near future. Since hiatal hernia repair pt is on famotidine and dexlansoprazole which she was taking w/o relief but cannot tolerate meds at this point either. Denies nausea, vomiting, fever, chills, chest pain, SOB, cough, abd pain or distension, diarrhea, dysuria, headache, dizziness, syncope.  In the ED, pt with stable VS. Labs non actionable. Pt refused CT AP as she just had upper GI series yesterday. GI cs placed. Pt was given IVF bolus and pepcid in ED w/o relief of sxs. Plan to place pt in CDU for cntd management including symptomatic relief and GI eval.  In the CDU*** 74 yo F with a PMH of HTN, PE after surgery not on AC, neuropathy, GERD p/w inability to tolerate PO and a burning sensation in her throat that is progressively worsening over the last 4 weeks since laparoscopic repair of recurrent hiatal hernia with Dr. Ethan Galaviz. Has since followed up and had an Upper GI series yesterday that was unremarkable. Given persistent sxs pt contacted Dr. Westfall office and they recommended she come to ED. Is planned for endoscopy at some point in the near future. Since hiatal hernia repair pt is on famotidine and dexlansoprazole which she was taking w/o relief but cannot tolerate meds at this point either. Denies nausea, vomiting, fever, chills, chest pain, SOB, cough, abd pain or distension, diarrhea, dysuria, headache, dizziness, syncope.  In the ED, pt with stable VS. Labs non actionable. Pt refused CT AP as she just had upper GI series yesterday. GI cs placed. Pt was given IVF bolus and pepcid in ED w/o relief of sxs. Plan to place pt in CDU for cntd management including symptomatic relief and GI eval.  In the CDU, patient seen and cleared by surgery and GI for outpatient f/u. GI recommending PPI and sucralfate. Pt tolerating PO. stable for d/c.

## 2023-12-29 NOTE — ED CDU PROVIDER DISPOSITION NOTE - NPI NUMBER (FOR SYSADMIN USE ONLY) :
[[id number]] [[id number] [[id number]],[[id number]] [[id number]] [[id number]],[[id number]] [[id number]],[[id number]]

## 2023-12-29 NOTE — ED CDU PROVIDER SUBSEQUENT DAY NOTE - CLINICAL SUMMARY MEDICAL DECISION MAKING FREE TEXT BOX
Attending MD Fernandes:  73-year-old woman presenting for evaluation of epigastric abdominal pain and difficulty eating in the setting of recent lap hiatal hernia repair.  Main emergency department workup with unrevealing lab work.  Normal chest x-ray.  Patient's pending GI consultation this morning.      INCOMPLETE       *The above represents an initial assessment/impression. Please refer to progress notes for potential changes in patient clinical course* Attending MD Fernandes:  73-year-old woman presenting for evaluation of epigastric abdominal pain and difficulty eating in the setting of recent lap hiatal hernia repair.  Main emergency department workup with unrevealing lab work.  Normal chest x-ray.  Patient's pending GI consultation this morning.    Patient denies any abdominal pain this morning.  Still having some odynophagia and slight difficulty swallowing.  She states that she is able to swallow but thin liquids she has a lot of difficulty with.  Patient was seen by GI this morning, we are pending their final recommendations.  General surgery consulted as well given recent procedure.  Ultimate disposition and treatment plan will be in accordance with subspecialist recommendations and symptom control.  Continue IV fluids for hydration.      *The above represents an initial assessment/impression. Please refer to progress notes for potential changes in patient clinical course*

## 2023-12-30 ENCOUNTER — NON-APPOINTMENT (OUTPATIENT)
Age: 73
End: 2023-12-30

## 2023-12-30 ENCOUNTER — INPATIENT (INPATIENT)
Facility: HOSPITAL | Age: 73
LOS: 4 days | Discharge: HOME | DRG: 392 | End: 2024-01-04
Attending: SURGERY | Admitting: SURGERY
Payer: MEDICARE

## 2023-12-30 VITALS
HEIGHT: 62 IN | HEART RATE: 96 BPM | RESPIRATION RATE: 20 BRPM | WEIGHT: 184.97 LBS | DIASTOLIC BLOOD PRESSURE: 89 MMHG | TEMPERATURE: 98 F | OXYGEN SATURATION: 95 % | SYSTOLIC BLOOD PRESSURE: 150 MMHG

## 2023-12-30 DIAGNOSIS — Z96.643 PRESENCE OF ARTIFICIAL HIP JOINT, BILATERAL: Chronic | ICD-10-CM

## 2023-12-30 DIAGNOSIS — Z98.49 CATARACT EXTRACTION STATUS, UNSPECIFIED EYE: Chronic | ICD-10-CM

## 2023-12-30 DIAGNOSIS — R10.10 UPPER ABDOMINAL PAIN, UNSPECIFIED: ICD-10-CM

## 2023-12-30 DIAGNOSIS — Z98.890 OTHER SPECIFIED POSTPROCEDURAL STATES: Chronic | ICD-10-CM

## 2023-12-30 PROBLEM — R21 RASH: Status: ACTIVE | Noted: 2023-12-19

## 2023-12-30 LAB
ALBUMIN SERPL ELPH-MCNC: 3.6 G/DL — SIGNIFICANT CHANGE UP (ref 3.3–5)
ALBUMIN SERPL ELPH-MCNC: 3.6 G/DL — SIGNIFICANT CHANGE UP (ref 3.3–5)
ALP SERPL-CCNC: 128 U/L — HIGH (ref 40–120)
ALP SERPL-CCNC: 128 U/L — HIGH (ref 40–120)
ALT FLD-CCNC: 24 U/L — SIGNIFICANT CHANGE UP (ref 10–45)
ALT FLD-CCNC: 24 U/L — SIGNIFICANT CHANGE UP (ref 10–45)
ANION GAP SERPL CALC-SCNC: 15 MMOL/L — SIGNIFICANT CHANGE UP (ref 5–17)
ANION GAP SERPL CALC-SCNC: 15 MMOL/L — SIGNIFICANT CHANGE UP (ref 5–17)
AST SERPL-CCNC: 18 U/L — SIGNIFICANT CHANGE UP (ref 10–40)
AST SERPL-CCNC: 18 U/L — SIGNIFICANT CHANGE UP (ref 10–40)
BASOPHILS # BLD AUTO: 0.05 K/UL — SIGNIFICANT CHANGE UP (ref 0–0.2)
BASOPHILS # BLD AUTO: 0.05 K/UL — SIGNIFICANT CHANGE UP (ref 0–0.2)
BASOPHILS NFR BLD AUTO: 0.9 % — SIGNIFICANT CHANGE UP (ref 0–2)
BASOPHILS NFR BLD AUTO: 0.9 % — SIGNIFICANT CHANGE UP (ref 0–2)
BILIRUB SERPL-MCNC: 0.4 MG/DL — SIGNIFICANT CHANGE UP (ref 0.2–1.2)
BILIRUB SERPL-MCNC: 0.4 MG/DL — SIGNIFICANT CHANGE UP (ref 0.2–1.2)
BUN SERPL-MCNC: 18 MG/DL — SIGNIFICANT CHANGE UP (ref 7–23)
BUN SERPL-MCNC: 18 MG/DL — SIGNIFICANT CHANGE UP (ref 7–23)
CALCIUM SERPL-MCNC: 9.2 MG/DL — SIGNIFICANT CHANGE UP (ref 8.4–10.5)
CALCIUM SERPL-MCNC: 9.2 MG/DL — SIGNIFICANT CHANGE UP (ref 8.4–10.5)
CHLORIDE SERPL-SCNC: 99 MMOL/L — SIGNIFICANT CHANGE UP (ref 96–108)
CHLORIDE SERPL-SCNC: 99 MMOL/L — SIGNIFICANT CHANGE UP (ref 96–108)
CO2 SERPL-SCNC: 25 MMOL/L — SIGNIFICANT CHANGE UP (ref 22–31)
CO2 SERPL-SCNC: 25 MMOL/L — SIGNIFICANT CHANGE UP (ref 22–31)
CREAT SERPL-MCNC: 0.77 MG/DL — SIGNIFICANT CHANGE UP (ref 0.5–1.3)
CREAT SERPL-MCNC: 0.77 MG/DL — SIGNIFICANT CHANGE UP (ref 0.5–1.3)
EGFR: 80 ML/MIN/1.73M2 — SIGNIFICANT CHANGE UP
EGFR: 80 ML/MIN/1.73M2 — SIGNIFICANT CHANGE UP
EOSINOPHIL # BLD AUTO: 0.09 K/UL — SIGNIFICANT CHANGE UP (ref 0–0.5)
EOSINOPHIL # BLD AUTO: 0.09 K/UL — SIGNIFICANT CHANGE UP (ref 0–0.5)
EOSINOPHIL NFR BLD AUTO: 1.7 % — SIGNIFICANT CHANGE UP (ref 0–6)
EOSINOPHIL NFR BLD AUTO: 1.7 % — SIGNIFICANT CHANGE UP (ref 0–6)
GLUCOSE SERPL-MCNC: 100 MG/DL — HIGH (ref 70–99)
GLUCOSE SERPL-MCNC: 100 MG/DL — HIGH (ref 70–99)
HCT VFR BLD CALC: 38.9 % — SIGNIFICANT CHANGE UP (ref 34.5–45)
HCT VFR BLD CALC: 38.9 % — SIGNIFICANT CHANGE UP (ref 34.5–45)
HGB BLD-MCNC: 12.3 G/DL — SIGNIFICANT CHANGE UP (ref 11.5–15.5)
HGB BLD-MCNC: 12.3 G/DL — SIGNIFICANT CHANGE UP (ref 11.5–15.5)
IMM GRANULOCYTES NFR BLD AUTO: 0.2 % — SIGNIFICANT CHANGE UP (ref 0–0.9)
IMM GRANULOCYTES NFR BLD AUTO: 0.2 % — SIGNIFICANT CHANGE UP (ref 0–0.9)
LYMPHOCYTES # BLD AUTO: 1.11 K/UL — SIGNIFICANT CHANGE UP (ref 1–3.3)
LYMPHOCYTES # BLD AUTO: 1.11 K/UL — SIGNIFICANT CHANGE UP (ref 1–3.3)
LYMPHOCYTES # BLD AUTO: 21.1 % — SIGNIFICANT CHANGE UP (ref 13–44)
LYMPHOCYTES # BLD AUTO: 21.1 % — SIGNIFICANT CHANGE UP (ref 13–44)
MCHC RBC-ENTMCNC: 28.2 PG — SIGNIFICANT CHANGE UP (ref 27–34)
MCHC RBC-ENTMCNC: 28.2 PG — SIGNIFICANT CHANGE UP (ref 27–34)
MCHC RBC-ENTMCNC: 31.6 GM/DL — LOW (ref 32–36)
MCHC RBC-ENTMCNC: 31.6 GM/DL — LOW (ref 32–36)
MCV RBC AUTO: 89.2 FL — SIGNIFICANT CHANGE UP (ref 80–100)
MCV RBC AUTO: 89.2 FL — SIGNIFICANT CHANGE UP (ref 80–100)
MONOCYTES # BLD AUTO: 0.38 K/UL — SIGNIFICANT CHANGE UP (ref 0–0.9)
MONOCYTES # BLD AUTO: 0.38 K/UL — SIGNIFICANT CHANGE UP (ref 0–0.9)
MONOCYTES NFR BLD AUTO: 7.2 % — SIGNIFICANT CHANGE UP (ref 2–14)
MONOCYTES NFR BLD AUTO: 7.2 % — SIGNIFICANT CHANGE UP (ref 2–14)
NEUTROPHILS # BLD AUTO: 3.63 K/UL — SIGNIFICANT CHANGE UP (ref 1.8–7.4)
NEUTROPHILS # BLD AUTO: 3.63 K/UL — SIGNIFICANT CHANGE UP (ref 1.8–7.4)
NEUTROPHILS NFR BLD AUTO: 68.9 % — SIGNIFICANT CHANGE UP (ref 43–77)
NEUTROPHILS NFR BLD AUTO: 68.9 % — SIGNIFICANT CHANGE UP (ref 43–77)
NRBC # BLD: 0 /100 WBCS — SIGNIFICANT CHANGE UP (ref 0–0)
NRBC # BLD: 0 /100 WBCS — SIGNIFICANT CHANGE UP (ref 0–0)
PLATELET # BLD AUTO: 272 K/UL — SIGNIFICANT CHANGE UP (ref 150–400)
PLATELET # BLD AUTO: 272 K/UL — SIGNIFICANT CHANGE UP (ref 150–400)
POTASSIUM SERPL-MCNC: 3.5 MMOL/L — SIGNIFICANT CHANGE UP (ref 3.5–5.3)
POTASSIUM SERPL-MCNC: 3.5 MMOL/L — SIGNIFICANT CHANGE UP (ref 3.5–5.3)
POTASSIUM SERPL-SCNC: 3.5 MMOL/L — SIGNIFICANT CHANGE UP (ref 3.5–5.3)
POTASSIUM SERPL-SCNC: 3.5 MMOL/L — SIGNIFICANT CHANGE UP (ref 3.5–5.3)
PROT SERPL-MCNC: 6.6 G/DL — SIGNIFICANT CHANGE UP (ref 6–8.3)
PROT SERPL-MCNC: 6.6 G/DL — SIGNIFICANT CHANGE UP (ref 6–8.3)
RBC # BLD: 4.36 M/UL — SIGNIFICANT CHANGE UP (ref 3.8–5.2)
RBC # BLD: 4.36 M/UL — SIGNIFICANT CHANGE UP (ref 3.8–5.2)
RBC # FLD: 14.8 % — HIGH (ref 10.3–14.5)
RBC # FLD: 14.8 % — HIGH (ref 10.3–14.5)
SODIUM SERPL-SCNC: 139 MMOL/L — SIGNIFICANT CHANGE UP (ref 135–145)
SODIUM SERPL-SCNC: 139 MMOL/L — SIGNIFICANT CHANGE UP (ref 135–145)
WBC # BLD: 5.27 K/UL — SIGNIFICANT CHANGE UP (ref 3.8–10.5)
WBC # BLD: 5.27 K/UL — SIGNIFICANT CHANGE UP (ref 3.8–10.5)
WBC # FLD AUTO: 5.27 K/UL — SIGNIFICANT CHANGE UP (ref 3.8–10.5)
WBC # FLD AUTO: 5.27 K/UL — SIGNIFICANT CHANGE UP (ref 3.8–10.5)

## 2023-12-30 PROCEDURE — 71260 CT THORAX DX C+: CPT | Mod: 26,MA

## 2023-12-30 PROCEDURE — 99285 EMERGENCY DEPT VISIT HI MDM: CPT

## 2023-12-30 PROCEDURE — 74177 CT ABD & PELVIS W/CONTRAST: CPT | Mod: 26,MA

## 2023-12-30 PROCEDURE — 71045 X-RAY EXAM CHEST 1 VIEW: CPT | Mod: 26

## 2023-12-30 RX ORDER — SODIUM CHLORIDE 9 MG/ML
1000 INJECTION INTRAMUSCULAR; INTRAVENOUS; SUBCUTANEOUS ONCE
Refills: 0 | Status: COMPLETED | OUTPATIENT
Start: 2023-12-30 | End: 2023-12-30

## 2023-12-30 RX ORDER — LOSARTAN POTASSIUM 100 MG/1
100 TABLET, FILM COATED ORAL DAILY
Refills: 0 | Status: DISCONTINUED | OUTPATIENT
Start: 2023-12-30 | End: 2024-01-04

## 2023-12-30 RX ORDER — ENOXAPARIN SODIUM 100 MG/ML
40 INJECTION SUBCUTANEOUS EVERY 24 HOURS
Refills: 0 | Status: DISCONTINUED | OUTPATIENT
Start: 2023-12-30 | End: 2024-01-04

## 2023-12-30 RX ORDER — DULOXETINE HYDROCHLORIDE 30 MG/1
1 CAPSULE, DELAYED RELEASE ORAL
Refills: 0 | DISCHARGE

## 2023-12-30 RX ORDER — SUCRALFATE 1 G
1 TABLET ORAL EVERY 6 HOURS
Refills: 0 | Status: DISCONTINUED | OUTPATIENT
Start: 2023-12-30 | End: 2024-01-04

## 2023-12-30 RX ORDER — PRAMIPEXOLE DIHYDROCHLORIDE 0.12 MG/1
1.25 TABLET ORAL THREE TIMES A DAY
Refills: 0 | Status: DISCONTINUED | OUTPATIENT
Start: 2023-12-30 | End: 2023-12-31

## 2023-12-30 RX ORDER — PANTOPRAZOLE SODIUM 20 MG/1
40 TABLET, DELAYED RELEASE ORAL ONCE
Refills: 0 | Status: COMPLETED | OUTPATIENT
Start: 2023-12-30 | End: 2023-12-30

## 2023-12-30 RX ORDER — LIDOCAINE 4 G/100G
15 CREAM TOPICAL ONCE
Refills: 0 | Status: COMPLETED | OUTPATIENT
Start: 2023-12-30 | End: 2023-12-30

## 2023-12-30 RX ORDER — FAMOTIDINE 10 MG/ML
40 INJECTION INTRAVENOUS DAILY
Refills: 0 | Status: DISCONTINUED | OUTPATIENT
Start: 2023-12-30 | End: 2024-01-04

## 2023-12-30 RX ORDER — PANTOPRAZOLE SODIUM 20 MG/1
40 TABLET, DELAYED RELEASE ORAL EVERY 12 HOURS
Refills: 0 | Status: DISCONTINUED | OUTPATIENT
Start: 2023-12-30 | End: 2024-01-04

## 2023-12-30 RX ORDER — SODIUM CHLORIDE 9 MG/ML
1000 INJECTION, SOLUTION INTRAVENOUS
Refills: 0 | Status: DISCONTINUED | OUTPATIENT
Start: 2023-12-30 | End: 2024-01-02

## 2023-12-30 RX ORDER — ATORVASTATIN CALCIUM 80 MG/1
10 TABLET, FILM COATED ORAL AT BEDTIME
Refills: 0 | Status: DISCONTINUED | OUTPATIENT
Start: 2023-12-30 | End: 2024-01-04

## 2023-12-30 RX ORDER — FAMOTIDINE 10 MG/ML
20 INJECTION INTRAVENOUS ONCE
Refills: 0 | Status: COMPLETED | OUTPATIENT
Start: 2023-12-30 | End: 2023-12-30

## 2023-12-30 RX ORDER — DENOSUMAB 60 MG/ML
60 INJECTION SUBCUTANEOUS
Refills: 0 | DISCHARGE

## 2023-12-30 RX ADMIN — LIDOCAINE 15 MILLILITER(S): 4 CREAM TOPICAL at 17:01

## 2023-12-30 RX ADMIN — FAMOTIDINE 20 MILLIGRAM(S): 10 INJECTION INTRAVENOUS at 17:15

## 2023-12-30 RX ADMIN — PANTOPRAZOLE SODIUM 40 MILLIGRAM(S): 20 TABLET, DELAYED RELEASE ORAL at 17:15

## 2023-12-30 RX ADMIN — SODIUM CHLORIDE 1000 MILLILITER(S): 9 INJECTION INTRAMUSCULAR; INTRAVENOUS; SUBCUTANEOUS at 17:04

## 2023-12-30 NOTE — ASSESSMENT
[FreeTextEntry1] : 72 yo female with h/o as above including hiatal hernia s/p repair with worsening GERD, recent covid infection, here for worsening pruritis maculopapular rash and recent white plaque on tongue (suspected oropharyngeal candidiasis) resolved with fluconazole. 1.  Derm - suspect possible drug allergy, given no improvement (and continued worsening) despite oral and topical steroids, possibly still exposed to offending allergen if is drug allergy, advised to stop all new products including PPI (as not helping anyway), stop THC, stop natural soap and lotions, and to f/up with derm; advised for area of excoriations/denuded skin left thigh to stop steroid cream (as thinning skin more) and instead use neosporin to area; also advised to see A/I for eval and allergy testing 2.  GI - to f/up with GI or surgeon for persistent GERD (not improved with PPI or H2 blocker); advised to cut down naproxen in case triggering gi symptoms 3.  Pulm - advisied to see pulm as well for persistent SOB/cough 4.  RTO prn, will f/up with pcp

## 2023-12-30 NOTE — ED ADULT NURSE NOTE - CHIEF COMPLAINT QUOTE
decreased PO tolerance (d/reagan from [facility] yesterday), cough, c/o chest/throat pain after eating, dx: gerd decreased PO tolerance (d/reagan from [facility] yesterday), cough, c/o chest/throat pain after eating, dx: gerd

## 2023-12-30 NOTE — H&P ADULT - ASSESSMENT
73F w/ hx of hiatal hernia repair in 2001 who developed hiatal hernia recurrence and now is s/p redo hiatal hernia repair with Toupet fundoplication on 11/30/23 with Dr. Galaviz. She has experienced worsening GERD symptoms since her last surgery, Upper GI study on 12/27 was unremarkable for mechanical etiology of her symptoms. Patient representing to the ED with persistent/worsening GERD symptoms despite anti-reflux medication optimization.    PLAN:  - Admit to Green Team Surgery under Dr. Sosa  - IV hydration in the setting of poor PO intake  - Anti-reflux medication: Omeprazole, Pepcid, and Carafate suspension  - Gi consultation for evaluation for possible upper endoscopy for luminal evaluation.  - DVT ppx    Discussed with attending surgeon Dr. Sosa.    BEBO Gregorio, PGY-4  [facility]   Green Team Surgery   [id number]   73F w/ hx of hiatal hernia repair in 2001 who developed hiatal hernia recurrence and now is s/p redo hiatal hernia repair with Toupet fundoplication on 11/30/23 with Dr. Galaviz. She has experienced worsening GERD symptoms since her last surgery, Upper GI study on 12/27 was unremarkable for mechanical etiology of her symptoms. Patient representing to the ED with persistent/worsening GERD symptoms despite anti-reflux medication optimization.    PLAN:  - Admit to Green Team Surgery under Dr. Sosa  - IV hydration in the setting of poor PO intake  - Anti-reflux medication: Omeprazole, Pepcid, and Carafate suspension  - Gi consultation for evaluation for possible upper endoscopy for luminal evaluation.  - DVT ppx    Discussed with attending surgeon Dr. Sosa.    BEBO Gregorio, PGY-4  [facility]   Green Team Surgery   [id number]

## 2023-12-30 NOTE — ED ADULT NURSE REASSESSMENT NOTE - NS ED NURSE REASSESS COMMENT FT1
Received report from MARIAH Sanders RN. Patient presenting to the ED with complaints of throat pain following hiatal hernia procedure. Patient notes pain 5/10 in severity, declining pain medications at this time. AOx4 and speaking coherently. Pt placed in position of comfort. Bed in lowest position, wheels locked, appropriate side rails raised. Pt denies needs at this time.

## 2023-12-30 NOTE — ED PROVIDER NOTE - PHYSICAL EXAMINATION
CONSTITUTIONAL: NAD  SKIN: Warm dry  ENT: no visible oral lesions  NECK: Supple  CARD: RRR  RESP: CTAB  ABD: mild epigastric TTP, otherwise non-distended, non-tender  EXT: no LE edema  NEURO: Grossly unremarkable  PSYCH: Cooperative, appropriate.

## 2023-12-30 NOTE — PHYSICAL EXAM
[No Acute Distress] : no acute distress [Well Nourished] : well nourished [Well Developed] : well developed [Well-Appearing] : well-appearing [Normal Sclera/Conjunctiva] : normal sclera/conjunctiva [EOMI] : extraocular movements intact [Supple] : supple [No Respiratory Distress] : no respiratory distress  [No Accessory Muscle Use] : no accessory muscle use [Clear to Auscultation] : lungs were clear to auscultation bilaterally [Normal Rate] : normal rate  [Regular Rhythm] : with a regular rhythm [Normal S1, S2] : normal S1 and S2 [No Murmur] : no murmur heard [Normal Gait] : normal gait [Normal Affect] : the affect was normal [de-identified] : no white plaques visible on tongue  [de-identified] : confluent mildly erythematous maculopapular rash on extremities and trunk; excoriations/dried blood/denuded skin left upper inner thigh

## 2023-12-30 NOTE — ED ADULT NURSE NOTE - NSFALLUNIVINTERV_ED_ALL_ED
Bed/Stretcher in lowest position, wheels locked, appropriate side rails in place/Call bell, personal items and telephone in reach/Instruct patient to call for assistance before getting out of bed/chair/stretcher/Non-slip footwear applied when patient is off stretcher/[place] to call system/Physically safe environment - no spills, clutter or unnecessary equipment/Purposeful proactive rounding/Room/bathroom lighting operational, light cord in reach Bed/Stretcher in lowest position, wheels locked, appropriate side rails in place/Call bell, personal items and telephone in reach/Instruct patient to call for assistance before getting out of bed/chair/stretcher/Non-slip footwear applied when patient is off stretcher/[place] to call system/Physically safe environment - no spills, clutter or unnecessary equipment/Purposeful proactive rounding/Room/bathroom lighting operational, light cord in reach

## 2023-12-30 NOTE — ED ADULT TRIAGE NOTE - CHIEF COMPLAINT QUOTE
decreased PO tolerance (d/reagan from [facility] yesterday), cough, c/o chest/throat pain after eating, dx: gerd decreased PO tolerance (d/reagan from [facility] yesterday), cough, c/o chest/throat pain after eating, dx: gerd

## 2023-12-30 NOTE — ED PROVIDER NOTE - PROGRESS NOTE DETAILS
Tobin PGY3: surg paged Surgery saw patient and reviewed CAT scans.  Surgery states they would like to admit patient to their service under Dr. Sosa Spoke with patient who understands and is happy with the plan.  Moira PGY1

## 2023-12-30 NOTE — ED PROVIDER NOTE - OBJECTIVE STATEMENT
73F PMH GERD, HTN, HLD, PSH b/l hip replacements, s/p lap repair w/ recurrent hiatal hernia, with chronic difficulty eating/swallowing presents to ED for eval of continued difficulty swallowing & pain. Had a hiatal hernia reapir 11/30 and since then had progressive diff with these issues. She was seen yesterday in ED by surgery and GI and was ultimately dc with GI meds which she didn't tolerate. She returns because nothing has been fixed. She was pending for GI scope, but given recent surgery they aren't cleared to do so.

## 2023-12-30 NOTE — HISTORY OF PRESENT ILLNESS
[FreeTextEntry8] : 72 yo female with h/o as below here for rash.   Had hiatal hernia repair 11/30/23, then weeks later developed acid reflux, burping constantly.  Was given omeprazole, then switched to lansoprazole but without relief, GERD worse than it was. GERD is so bad that feels cramps in chest, can't eat anything. Also developed covid and given paxlovid. Then rash developed, was given oral steroids without improvement, then saw derm and had topical steroids without improvement. Thigh opened up and burning, put cortisone cream on it. Now lip is swollen, rash is more itchy. New meds are omeprazole and lansoprazole.  Rash started before budesonide inhaler.  Still has SOB and cough, saw pulm previously. Son has been giving her THC for her legs, helps her restless legs but just started. Developed white chalky coating on her tongue, now gone with fluconazole given over the weekend. No new laundry detergent (switched to free/clear detergent), had natural soap but stopped it (stopped 2 weeks ago), used coconut oil after shower for dry skin but stopped that 10 days ago, no new foods.   Started allegra and benadryl.   Doesn't have allergist.   No other active issues.

## 2023-12-30 NOTE — ED ADULT NURSE NOTE - OBJECTIVE STATEMENT
- - - 73y F BIB self from home p/w continued difficulty swallowing & pain. Pt is axo4, ambulates with rollator at baseline. Had a hiatal hernia repair on 11/30 and since then had progressive issues with pain upon swallowing. She was seen yesterday in ED by surgery and GI and d/c with GI meds which she didn't tolerate. She returns due to nothing has been fixed. Can swallow fluids but expresses facial grimace due to discomfort. Denies headache, dizziness, vision changes, chest pain, shortness of breath, abdominal pain, nausea, vomiting, diarrhea, fevers, chills, dysuria, hematuria, recent illness travel or fall. Patient undressed and placed into gown, call bell in hand and side rails up with bed in lowest position for safety. blanket provided. Comfort and safety provided.

## 2023-12-30 NOTE — H&P ADULT - NSHPPHYSICALEXAM_GEN_ALL_CORE
VITAL SIGNS:  Vital Signs Last 24 Hrs  T(C): 36.6 (30 Dec 2023 19:54), Max: 36.7 (30 Dec 2023 13:30)  T(F): 97.9 (30 Dec 2023 19:54), Max: 98.1 (30 Dec 2023 13:30)  HR: 79 (30 Dec 2023 19:54) (79 - 96)  BP: 155/83 (30 Dec 2023 19:54) (138/84 - 155/83)  BP(mean): --  RR: 18 (30 Dec 2023 19:54) (18 - 20)  SpO2: 97% (30 Dec 2023 19:54) (95% - 97%)    Parameters below as of 30 Dec 2023 19:54  Patient On (Oxygen Delivery Method): room air    PHYSICAL EXAM:    General: NAD, Sitting in bed comfortably  Cardio: RRR  Resp: Good effort, CTA b/l  GI/Abd: Soft, NT/ND, no rebound/guarding, no masses palpated, laparoscopic scars well healed  Musculoskeletal: All 4 extremities moving spontaneously, no limitations

## 2023-12-30 NOTE — ED ADULT NURSE NOTE - CAS EDP DISCH DISPOSITION ADMI
----- Message from Angelic Arana NP sent at 10/6/2022 11:53 AM CDT -----  Please inform patient that Covid-19 and strep tests are negative. Proceed with symptomatic treatment as discussed during your visit and return with any new or worsening symptoms. Thanks - AT     Surgery

## 2023-12-30 NOTE — H&P ADULT - NSHPLABSRESULTS_GEN_ALL_CORE
LABS:                        12.3   5.27  )-----------( 272      ( 30 Dec 2023 17:05 )             38.9     30 Dec 2023 17:05    139    |  99     |  18     ----------------------------<  100    3.5     |  25     |  0.77     Ca    9.2        30 Dec 2023 17:05    TPro  6.6    /  Alb  3.6    /  TBili  0.4    /  DBili  x      /  AST  18     /  ALT  24     /  AlkPhos  128    30 Dec 2023 17:05      CAPILLARY BLOOD GLUCOSE            LIVER FUNCTIONS - ( 30 Dec 2023 17:05 )  Alb: 3.6 g/dL / Pro: 6.6 g/dL / ALK PHOS: 128 U/L / ALT: 24 U/L / AST: 18 U/L / GGT: x             Urinalysis Basic - ( 30 Dec 2023 17:05 )    Color: x / Appearance: x / SG: x / pH: x  Gluc: 100 mg/dL / Ketone: x  / Bili: x / Urobili: x   Blood: x / Protein: x / Nitrite: x   Leuk Esterase: x / RBC: x / WBC x   Sq Epi: x / Non Sq Epi: x / Bacteria: x      IMAGING:  ****

## 2023-12-30 NOTE — H&P ADULT - HISTORY OF PRESENT ILLNESS
73F w/ PMHx of HTN, HLD, GERD, Hiatal hernia s/p repair w/ Toupet fundoplication (2001), and recent laparoscopic redo hiatal hernia repair with redo Toupet fundoplication on 11/30/23 by Dr. Galaviz, presenting now with ~2week history of new onset severe post prandial chest burning sensation/pain and strong acid taste in her moth that she states was never present prior to surgery. This past week, patient's symptoms progressively worsened thus prompting her to contact Dr. Galaviz's office who then obtained an upper GI study to assess the status of the hernia repair. Results form this study were unremarkable with no suspicion of hernia recurrence or a tight fundoplication, thus patient was advised to take GERD medication. Yesterday, symptoms persisted and she presented to the ED for evaluation where GI and Bariatric surgery evaluated her and opted for optimization of her anti-reflux medication (PPI, Pepcid, and Carafate suspension). Today, patient represents to the ED complaining of persistent/worsening GERD symptoms despite these medication adjustments. Patient states she is able to drink and eat solid food, however does not take much due to GERD symptoms. No nausea or vomiting. Passing gas and having BMs.    In the ED, patient was afebrile, hemodynamically stable, labs largely unremarkable, CTAP w/ IV and PO contrast revealed no intraabdominal pathology or concerns for mechanical complications related to her recent hiatal hernia repair.   73F w/ PMHx of HTN, HLD, GERD, Hiatal hernia s/p repair w/ Toupet fundoplication (2001)c/b hiatal hernia recurrence, now s/p recent laparoscopic redo hiatal hernia repair with redo Toupet fundoplication on 11/30/23 by Dr. Galaviz, presenting now with ~2week history of new onset severe post prandial chest burning sensation/pain and strong acid taste in her moth that she states was never present prior to surgery. This past week, patient's symptoms progressively worsened thus prompting her to contact Dr. Galaviz's office who then obtained an upper GI study to assess the status of the hernia repair. Results form this study were unremarkable with no suspicion of hernia recurrence or a tight fundoplication, thus patient was advised to take GERD medication. Yesterday, symptoms persisted and she presented to the ED for evaluation where GI and Bariatric surgery evaluated her and opted for optimization of her anti-reflux medication (PPI, Pepcid, and Carafate suspension). Today, patient represents to the ED complaining of persistent/worsening GERD symptoms despite these medication adjustments. Patient states she is able to drink and eat solid food, however does not take much due to GERD symptoms. No nausea or vomiting. Passing gas and having BMs.    In the ED, patient was afebrile, hemodynamically stable, labs largely unremarkable, CTAP w/ IV and PO contrast revealed no intraabdominal pathology or concerns for mechanical complications related to her recent hiatal hernia repair.

## 2023-12-30 NOTE — ED PROVIDER NOTE - ATTENDING CONTRIBUTION TO CARE
Attending MD FINESSE Frederick I performed a history and physical exam of the patient and discussed their management with the resident. I reviewed the resident's note and agree with the documented findings and plan of care, except as noted. My medical decision making and observations are as follows:    73 F with PMH HTN, HLD, GERD s/p recent laparoscopic hiatal hernia repair and fundoplication on 11/30 presenting for evaluation of pain with swallowing.  Seen in ED 2 days prior including CDU course for similar symptoms; patient states she was not candidate for endoscopy or CT to evaluate esophagus, subsequently discharged with recommendation to continue medication regimen.  Patient reports continued severe pain including while drinking water; has forced herself to consume minimal p.o. intake for nutrition but has been unable to resume normal quantity of p.o. intake.  Reports few episodes of NBNB emesis.  No fever, chest pain, shortness of breath, cough, diarrhea, dysuria.  On exam, patient no acute distress, grimacing while drinking water.  Her lungs clear to auscultation, abdomen soft and nontender, surgical scars well-healing, no CVA tenderness, no pedal edema.    MDM–73 female s/p recent hernia repair/fundoplication presenting with severe odynophagia, we presenting 24 hours after discharge from CDU.  Will administer medications including viscous lidocaine for symptomatic treatment, CT chest to evaluate postsurgical anatomy, discuss case with gastroenterology and surgery for recommendations on how best to evaluate patient; at this time, patient is inappropriate for discharge given persistence of symptoms which will likely result in dehydration and/or metabolic derangements.    1900-signed out to Dr. Heck pending labs, CT, reassessment, recommendations from surgery and GI.

## 2023-12-31 LAB
ANION GAP SERPL CALC-SCNC: 11 MMOL/L — SIGNIFICANT CHANGE UP (ref 5–17)
ANION GAP SERPL CALC-SCNC: 11 MMOL/L — SIGNIFICANT CHANGE UP (ref 5–17)
BUN SERPL-MCNC: 8 MG/DL — SIGNIFICANT CHANGE UP (ref 7–23)
BUN SERPL-MCNC: 8 MG/DL — SIGNIFICANT CHANGE UP (ref 7–23)
CALCIUM SERPL-MCNC: 8.8 MG/DL — SIGNIFICANT CHANGE UP (ref 8.4–10.5)
CALCIUM SERPL-MCNC: 8.8 MG/DL — SIGNIFICANT CHANGE UP (ref 8.4–10.5)
CHLORIDE SERPL-SCNC: 102 MMOL/L — SIGNIFICANT CHANGE UP (ref 96–108)
CHLORIDE SERPL-SCNC: 102 MMOL/L — SIGNIFICANT CHANGE UP (ref 96–108)
CO2 SERPL-SCNC: 24 MMOL/L — SIGNIFICANT CHANGE UP (ref 22–31)
CO2 SERPL-SCNC: 24 MMOL/L — SIGNIFICANT CHANGE UP (ref 22–31)
CREAT SERPL-MCNC: 0.76 MG/DL — SIGNIFICANT CHANGE UP (ref 0.5–1.3)
CREAT SERPL-MCNC: 0.76 MG/DL — SIGNIFICANT CHANGE UP (ref 0.5–1.3)
EGFR: 82 ML/MIN/1.73M2 — SIGNIFICANT CHANGE UP
EGFR: 82 ML/MIN/1.73M2 — SIGNIFICANT CHANGE UP
GLUCOSE SERPL-MCNC: 66 MG/DL — LOW (ref 70–99)
GLUCOSE SERPL-MCNC: 66 MG/DL — LOW (ref 70–99)
HCT VFR BLD CALC: 34.6 % — SIGNIFICANT CHANGE UP (ref 34.5–45)
HCT VFR BLD CALC: 34.6 % — SIGNIFICANT CHANGE UP (ref 34.5–45)
HGB BLD-MCNC: 11.3 G/DL — LOW (ref 11.5–15.5)
HGB BLD-MCNC: 11.3 G/DL — LOW (ref 11.5–15.5)
MAGNESIUM SERPL-MCNC: 1.7 MG/DL — SIGNIFICANT CHANGE UP (ref 1.6–2.6)
MAGNESIUM SERPL-MCNC: 1.7 MG/DL — SIGNIFICANT CHANGE UP (ref 1.6–2.6)
MCHC RBC-ENTMCNC: 28.5 PG — SIGNIFICANT CHANGE UP (ref 27–34)
MCHC RBC-ENTMCNC: 28.5 PG — SIGNIFICANT CHANGE UP (ref 27–34)
MCHC RBC-ENTMCNC: 32.7 GM/DL — SIGNIFICANT CHANGE UP (ref 32–36)
MCHC RBC-ENTMCNC: 32.7 GM/DL — SIGNIFICANT CHANGE UP (ref 32–36)
MCV RBC AUTO: 87.2 FL — SIGNIFICANT CHANGE UP (ref 80–100)
MCV RBC AUTO: 87.2 FL — SIGNIFICANT CHANGE UP (ref 80–100)
NRBC # BLD: 0 /100 WBCS — SIGNIFICANT CHANGE UP (ref 0–0)
NRBC # BLD: 0 /100 WBCS — SIGNIFICANT CHANGE UP (ref 0–0)
PHOSPHATE SERPL-MCNC: 1.7 MG/DL — LOW (ref 2.5–4.5)
PHOSPHATE SERPL-MCNC: 1.7 MG/DL — LOW (ref 2.5–4.5)
PLATELET # BLD AUTO: 237 K/UL — SIGNIFICANT CHANGE UP (ref 150–400)
PLATELET # BLD AUTO: 237 K/UL — SIGNIFICANT CHANGE UP (ref 150–400)
POTASSIUM SERPL-MCNC: 3.3 MMOL/L — LOW (ref 3.5–5.3)
POTASSIUM SERPL-MCNC: 3.3 MMOL/L — LOW (ref 3.5–5.3)
POTASSIUM SERPL-SCNC: 3.3 MMOL/L — LOW (ref 3.5–5.3)
POTASSIUM SERPL-SCNC: 3.3 MMOL/L — LOW (ref 3.5–5.3)
RBC # BLD: 3.97 M/UL — SIGNIFICANT CHANGE UP (ref 3.8–5.2)
RBC # BLD: 3.97 M/UL — SIGNIFICANT CHANGE UP (ref 3.8–5.2)
RBC # FLD: 14.9 % — HIGH (ref 10.3–14.5)
RBC # FLD: 14.9 % — HIGH (ref 10.3–14.5)
SODIUM SERPL-SCNC: 137 MMOL/L — SIGNIFICANT CHANGE UP (ref 135–145)
SODIUM SERPL-SCNC: 137 MMOL/L — SIGNIFICANT CHANGE UP (ref 135–145)
WBC # BLD: 4.94 K/UL — SIGNIFICANT CHANGE UP (ref 3.8–10.5)
WBC # BLD: 4.94 K/UL — SIGNIFICANT CHANGE UP (ref 3.8–10.5)
WBC # FLD AUTO: 4.94 K/UL — SIGNIFICANT CHANGE UP (ref 3.8–10.5)
WBC # FLD AUTO: 4.94 K/UL — SIGNIFICANT CHANGE UP (ref 3.8–10.5)

## 2023-12-31 RX ORDER — HYOSCYAMINE SULFATE 0.13 MG
0.12 TABLET ORAL EVERY 6 HOURS
Refills: 0 | Status: DISCONTINUED | OUTPATIENT
Start: 2023-12-31 | End: 2024-01-04

## 2023-12-31 RX ORDER — NYSTATIN CREAM 100000 [USP'U]/G
1 CREAM TOPICAL
Refills: 0 | Status: DISCONTINUED | OUTPATIENT
Start: 2023-12-31 | End: 2024-01-04

## 2023-12-31 RX ORDER — MAGNESIUM SULFATE 500 MG/ML
2 VIAL (ML) INJECTION ONCE
Refills: 0 | Status: COMPLETED | OUTPATIENT
Start: 2023-12-31 | End: 2023-12-31

## 2023-12-31 RX ORDER — PRAMIPEXOLE DIHYDROCHLORIDE 0.12 MG/1
3.75 TABLET ORAL AT BEDTIME
Refills: 0 | Status: DISCONTINUED | OUTPATIENT
Start: 2023-12-31 | End: 2023-12-31

## 2023-12-31 RX ORDER — POTASSIUM PHOSPHATE, MONOBASIC POTASSIUM PHOSPHATE, DIBASIC 236; 224 MG/ML; MG/ML
30 INJECTION, SOLUTION INTRAVENOUS ONCE
Refills: 0 | Status: COMPLETED | OUTPATIENT
Start: 2023-12-31 | End: 2023-12-31

## 2023-12-31 RX ORDER — POLYETHYLENE GLYCOL 3350 17 G/17G
17 POWDER, FOR SOLUTION ORAL DAILY
Refills: 0 | Status: DISCONTINUED | OUTPATIENT
Start: 2023-12-31 | End: 2023-12-31

## 2023-12-31 RX ORDER — POTASSIUM CHLORIDE 20 MEQ
10 PACKET (EA) ORAL
Refills: 0 | Status: COMPLETED | OUTPATIENT
Start: 2023-12-31 | End: 2023-12-31

## 2023-12-31 RX ORDER — PRAMIPEXOLE DIHYDROCHLORIDE 0.12 MG/1
1.25 TABLET ORAL THREE TIMES A DAY
Refills: 0 | Status: DISCONTINUED | OUTPATIENT
Start: 2023-12-31 | End: 2023-12-31

## 2023-12-31 RX ORDER — SENNA PLUS 8.6 MG/1
2 TABLET ORAL AT BEDTIME
Refills: 0 | Status: COMPLETED | OUTPATIENT
Start: 2023-12-31 | End: 2023-12-31

## 2023-12-31 RX ADMIN — SODIUM CHLORIDE 100 MILLILITER(S): 9 INJECTION, SOLUTION INTRAVENOUS at 01:17

## 2023-12-31 RX ADMIN — Medication 25 GRAM(S): at 11:42

## 2023-12-31 RX ADMIN — Medication 1 GRAM(S): at 22:39

## 2023-12-31 RX ADMIN — POTASSIUM PHOSPHATE, MONOBASIC POTASSIUM PHOSPHATE, DIBASIC 83.33 MILLIMOLE(S): 236; 224 INJECTION, SOLUTION INTRAVENOUS at 11:42

## 2023-12-31 RX ADMIN — Medication 1 GRAM(S): at 17:11

## 2023-12-31 RX ADMIN — NYSTATIN CREAM 1 APPLICATION(S): 100000 CREAM TOPICAL at 17:11

## 2023-12-31 RX ADMIN — PANTOPRAZOLE SODIUM 40 MILLIGRAM(S): 20 TABLET, DELAYED RELEASE ORAL at 05:18

## 2023-12-31 RX ADMIN — SODIUM CHLORIDE 100 MILLILITER(S): 9 INJECTION, SOLUTION INTRAVENOUS at 22:38

## 2023-12-31 RX ADMIN — SENNA PLUS 2 TABLET(S): 8.6 TABLET ORAL at 23:23

## 2023-12-31 RX ADMIN — ATORVASTATIN CALCIUM 10 MILLIGRAM(S): 80 TABLET, FILM COATED ORAL at 01:17

## 2023-12-31 RX ADMIN — Medication 0.12 MILLIGRAM(S): at 23:24

## 2023-12-31 RX ADMIN — FAMOTIDINE 40 MILLIGRAM(S): 10 INJECTION INTRAVENOUS at 11:17

## 2023-12-31 RX ADMIN — LOSARTAN POTASSIUM 100 MILLIGRAM(S): 100 TABLET, FILM COATED ORAL at 01:17

## 2023-12-31 RX ADMIN — Medication 1 GRAM(S): at 01:17

## 2023-12-31 RX ADMIN — Medication 0.12 MILLIGRAM(S): at 11:17

## 2023-12-31 RX ADMIN — Medication 1 GRAM(S): at 11:17

## 2023-12-31 RX ADMIN — ENOXAPARIN SODIUM 40 MILLIGRAM(S): 100 INJECTION SUBCUTANEOUS at 17:11

## 2023-12-31 RX ADMIN — ATORVASTATIN CALCIUM 10 MILLIGRAM(S): 80 TABLET, FILM COATED ORAL at 22:38

## 2023-12-31 RX ADMIN — PANTOPRAZOLE SODIUM 40 MILLIGRAM(S): 20 TABLET, DELAYED RELEASE ORAL at 17:10

## 2023-12-31 RX ADMIN — Medication 100 MILLIEQUIVALENT(S): at 11:14

## 2023-12-31 RX ADMIN — SODIUM CHLORIDE 100 MILLILITER(S): 9 INJECTION, SOLUTION INTRAVENOUS at 03:30

## 2023-12-31 RX ADMIN — Medication 0.12 MILLIGRAM(S): at 17:11

## 2023-12-31 RX ADMIN — Medication 100 MILLIEQUIVALENT(S): at 15:07

## 2023-12-31 RX ADMIN — Medication 1 GRAM(S): at 05:17

## 2023-12-31 RX ADMIN — NYSTATIN CREAM 1 APPLICATION(S): 100000 CREAM TOPICAL at 05:18

## 2023-12-31 RX ADMIN — Medication 100 MILLIEQUIVALENT(S): at 13:53

## 2023-12-31 NOTE — PATIENT PROFILE ADULT - FUNCTIONAL ASSESSMENT - BASIC MOBILITY 4.
Telephone Encounter by Regine Tavera CMA at 7/1/2021  4:57 PM     Author: Regine Tavera CMA Service: -- Author Type: Certified Medical Assistant    Filed: 7/1/2021  4:58 PM Encounter Date: 7/1/2021 Status: Signed    : Regine Tavera CMA (Certified Medical Assistant)       Unable to schedule pt. Please give pt a call to schedule. Thank you      Next Appointment:  4 Months (in clinic/in person), sooner if necessary.    Tests:      Please have labs performed a few days prior to next visit.     Echocardiogram     Pulmonary function     Chest x-ray        4 = No assist / stand by assistance

## 2023-12-31 NOTE — CONSULT NOTE ADULT - SUBJECTIVE AND OBJECTIVE BOX
Initial GI Consult    Patient is a 73y old  Female who presents with a chief complaint of Worsening GERD symptoms after hiatal hernia repair. (31 Dec 2023 04:05)    HPI:  73F w/ PMHx of HTN, HLD, GERD, Hiatal hernia s/p repair w/ Toupet fundoplication (2001) c/b hiatal hernia recurrence, now s/p recent laparoscopic redo hiatal hernia repair with redo Toupet fundoplication on 11/30/23 by Dr. Galaviz, presenting now with epigastric pain and acidic taste in her mouth. Patient notes sxs started after surgery one month ago and progressively got worse. She was seen by Dr. Galaviz's who obtained an upper GI series to assess the status of the hernia repair which was unremarkable with no suspicion of hernia recurrence or a tight fundoplication. Patient advised to take GERD medication. Symptoms persisted prompting her to come back to ED on 29th where GI and Bariatric surgery evaluated her and opted for optimization of her anti-reflux medication (PPI, Pepcid, and Carafate suspension). Patient again re-presents to the ED complaining of persistent/worsening GERD symptoms despite these medication adjustments. Patient states that she has been able to eat both liquid and solids but gets very bad reflux and burning pain shortly after eating. She denies odynophagia and dysphagia. She denies nausea and vomiting but had dry heaves from epigastric pain. This pain is waking her up in the middles of the night. She is not losing weight because of this but rather gaining weight due to nightly THC gummies that she takes. Last EGD was prior to fundoplication. Of note, patient states that two weeks ago she had thrush due to a short course of steroids that she was taking but the thrush has resolved.     PAST MEDICAL & SURGICAL HISTORY:  H/O seasonal allergies      History of pulmonary embolism  developed after billings abigail surgery,1984 treated with coumadin 3 months, no issues after      Restless leg syndrome      GERD (gastroesophageal reflux disease)      OA (osteoarthritis)      Fungal infection of skin  under breast      H/O scoliosis      Essential hypertension      Depression      Osteoporosis      Right hip pain      2019 novel coronavirus disease (COVID-19)  Dec 2020- hospitalized for 3 days, did not require home O2, denies residual symptoms      Hiatal hernia      History of chronic pain      Peripheral neuropathy      S/P repair of paraesophageal hernia  2001      S/P spinal fusion  L4-L5 1966      Scoliosis  s/p placement of billings abigail x 2 1984      S/P spinal surgery  x 2 1985, 1986      Removal of pin, plate, abigail, or screw  1991- removal of billings abigail      S/P hysterectomy  1982      S/P hip replacement  left (2008)      S/P shoulder surgery  right (2012)      S/P dilatation and curettage  1981      H/O arthroscopy of knee  June 2021      S/P cataract surgery      History of bilateral hip replacements        FH:  FAMILY HISTORY:      MEDS:  MEDICATIONS  (STANDING):  (pramipexole) Mirapex 3.75 milliGRAM(s) 3.75 milliGRAM(s) Oral at bedtime  atorvastatin 10 milliGRAM(s) Oral at bedtime  enoxaparin Injectable 40 milliGRAM(s) SubCutaneous every 24 hours  famotidine   Suspension 40 milliGRAM(s) Oral daily  hyoscyamine SL 0.125 milliGRAM(s) SubLingual every 6 hours  lactated ringers. 1000 milliLiter(s) (100 mL/Hr) IV Continuous <Continuous>  losartan 100 milliGRAM(s) Oral daily  nystatin Powder 1 Application(s) Topical two times a day  pantoprazole   Suspension 40 milliGRAM(s) Oral every 12 hours  sucralfate suspension 1 Gram(s) Oral every 6 hours    MEDICATIONS  (PRN):    Allergies    Dilantin (Urticaria)  penicillins (Urticaria)    Intolerances    erythromycin (Stomach Upset; Vomiting; Nausea)      CONSTITUTIONAL:  No weight loss, fever, chills, weakness or fatigue.  HEENT:  Eyes:  No visual loss, blurred vision, double vision or yellow sclerae.  SKIN:  No rash or itching.  CARDIOVASCULAR: + chest/epigastric pain, no chest pressure or chest discomfort.  RESPIRATORY:  No shortness of breath, cough or sputum.  GASTROINTESTINAL:  SEE HPI  GENITOURINARY:  No dysuria, hematuria, urinary frequency  NEUROLOGICAL:  No headache, dizziness, syncope, paralysis, ataxia, numbness or tingling in the extremities.  MUSCULOSKELETAL:  No muscle, back pain, joint pain or stiffness.  ENDOCRINOLOGIC:  No reports of sweating, cold or heat intolerance. No polyuria or polydipsia.      ______________________________________________________________________  PHYSICAL EXAM:  T(C): 36.9 (12-31-23 @ 16:37), Max: 37.2 (12-31-23 @ 05:10)  HR: 100 (12-31-23 @ 16:37)  BP: 126/76 (12-31-23 @ 16:37)  RR: 18 (12-31-23 @ 16:37)  SpO2: 98% (12-31-23 @ 16:37)  Wt(kg): --    12-30 - 12-31  --------------------------------------------------------  IN:  Total IN: 0 mL    OUT:    Voided (mL): 1200 mL  Total OUT: 1200 mL    Total NET: -1200 mL      12-31 - 12-31  --------------------------------------------------------  IN:    IV PiggyBack: 300 mL    IV PiggyBack: 500 mL    IV PiggyBack: 50 mL    Lactated Ringers: 1200 mL  Total IN: 2050 mL    OUT:    Oral Fluid: 0 mL  Total OUT: 0 mL    Total NET: 2050 mL        GEN: NAD, normocephalic  MOUTH: No thrush   CVS: S1S2+  CHEST: clear to auscultation  ABD: soft , nontender, nondistended, bowel sounds present  EXTR: no cyanosis, no clubbing, no edema  NEURO: A&OX3  SKIN:  warm;  non icteric    ______________________________________________________________________  LABS:                        11.3   4.94  )-----------( 237      ( 31 Dec 2023 07:19 )             34.6     12-31    137  |  102  |  8   ----------------------------<  66<L>  3.3<L>   |  24  |  0.76    Ca    8.8      31 Dec 2023 07:27  Phos  1.7     12-31  Mg     1.7     12-31    TPro  6.6  /  Alb  3.6  /  TBili  0.4  /  DBili  x   /  AST  18  /  ALT  24  /  AlkPhos  128<H>  12-30    LIVER FUNCTIONS - ( 30 Dec 2023 17:05 )  Alb: 3.6 g/dL / Pro: 6.6 g/dL / ALK PHOS: 128 U/L / ALT: 24 U/L / AST: 18 U/L / GGT: x             ____________________________________________        < from: CT Chest w/ Oral Cont and w/ IV Cont (12.30.23 @ 22:33) >      INTERPRETATION:  CLINICAL INFORMATION: Epigastric pain.    COMPARISON: CT the abdomen and pelvis from 10/6/2023 and CTA of the chest   from 7/22/2007.    CONTRAST/COMPLICATIONS:  IV Contrast: Omnipaque 350 (accession [id number]), IV contrast documented   in unlinked concurrent exam (accession [id number])  90 cc administered   10   cc discarded  Oral Contrast: Omnipaque 300 (accession [id number]), NONE (accession   [id number])  Complications: None reported at time of study completion    PROCEDURE:  CT of the Chest, Abdomen and Pelvis was performed.  Sagittal and coronal reformats were performed.    FINDINGS:  CHEST:  LUNGS AND LARGE AIRWAYS: Patent central airways. No lung consolidation,   abnormal groundglass opacity, suspicious nodule, or mass. Bibasilar   dependent and platelike atelectasis.  PLEURA: No pleural effusion.  VESSELS: Main pulmonary artery is not dilated. Thoracic aorta is normal   in caliber. Atherosclerotic calcifications of the thoracic aorta, great   vessels, and coronary arteries.  HEART: Heart is normal in size. Trace pericardial effusion.  MEDIASTINUM AND PILAR: No lymphadenopathy. Thickening of the esophageal   walls and slight infiltration of the paraesophageal fat and few prominent   subcentimeter paraesophageal lymph nodes.  CHEST WALL AND LOWER NECK: Mild heterogeneity of the thyroid gland and   coarse calcification in the left thyroid lobe.    ABDOMEN AND PELVIS:  LIVER: Scattered hepatic cysts including a large multilobular right   hepatic lobe cyst measuring up to 7.5 cm.  BILE DUCTS: Normal caliber.  GALLBLADDER: Within normal limits.  SPLEEN: Within normal limits.  PANCREAS: Within normal limits.  ADRENALS: Within normal limits.  KIDNEYS/URETERS: Kidneys enhance symmetrically without hydronephrosis.   Contrast in the renal collecting systems.    BLADDER: Distended urinary bladder.  REPRODUCTIVE ORGANS: Uterus is absent. No pelvic mass or cyst.    BOWEL: High density debris in the colon limiting evaluation and resulting   in streak artifact. No bowel obstruction. Mild colonic diverticulosis   without evidenceof diverticulitis. Appendix is not visualized.  PERITONEUM: No ascites, pneumoperitoneum, or loculated collection. No   mesenteric lymphadenopathy.  VESSELS: Atherosclerotic calcifications of the aortoiliac tree. Normal   caliber abdominal aorta.  RETROPERITONEUM/LYMPH NODES: No lymphadenopathy.  ABDOMINAL WALL: Tiny fat-containing umbilical hernia. Postsurgical   changes.  BONES: Bilateral femoral arthroplasties. Degenerative changes at the   sacroiliac joints with mild symmetric widening of thesacroiliac joints.   Evidence of thoracolumbar fusion.    IMPRESSION:  No acute parenchymal or pleural lung disease.    Thickening of the esophageal walls and slight infiltration of the   paraesophageal fat and few prominent subcentimeter paraesophageal lymph   nodes likely reflective of an esophagitis.    No bowel obstruction. Mild colonic diverticulosis without evidence of   diverticulitis.    Hepatic cysts.    Distended urinary bladder.        < end of copied text >   Initial GI Consult    Patient is a 73y old  Female who presents with a chief complaint of Worsening GERD symptoms after hiatal hernia repair. (31 Dec 2023 04:05)    HPI:  73F w/ PMHx of HTN, HLD, GERD, Hiatal hernia s/p repair w/ Toupet fundoplication (2001) c/b hiatal hernia recurrence, now s/p recent laparoscopic redo hiatal hernia repair with redo Toupet fundoplication on 11/30/23 by Dr. Galaviz, presenting now with epigastric pain and acidic taste in her mouth. Patient notes sxs started after surgery one month ago and progressively got worse. She was seen by Dr. Galaviz's who obtained an upper GI series to assess the status of the hernia repair which was unremarkable with no suspicion of hernia recurrence or a tight fundoplication. Patient advised to take GERD medication. Symptoms persisted prompting her to come back to ED on 29th where GI and Bariatric surgery evaluated her and opted for optimization of her anti-reflux medication (PPI, Pepcid, and Carafate suspension). Patient again re-presents to the ED complaining of persistent/worsening GERD symptoms despite these medication adjustments. Patient states that she has been able to eat both liquid and solids but gets very bad reflux and burning pain shortly after eating. She denies odynophagia and dysphagia. She denies nausea and vomiting but had dry heaves from epigastric pain. This pain is waking her up in the middles of the night. She is not losing weight because of this but rather gaining weight due to nightly THC gummies that she takes. Last EGD was prior to fundoplication. Of note, patient states that two weeks ago she had thrush due to a short course of steroids that she was taking but the thrush has resolved.     PAST MEDICAL & SURGICAL HISTORY:  H/O seasonal allergies      History of pulmonary embolism  developed after billings abigail surgery,1984 treated with coumadin 3 months, no issues after      Restless leg syndrome      GERD (gastroesophageal reflux disease)      OA (osteoarthritis)      Fungal infection of skin  under breast      H/O scoliosis      Essential hypertension      Depression      Osteoporosis      Right hip pain      2019 novel coronavirus disease (COVID-19)  Dec 2020- hospitalized for 3 days, did not require home O2, denies residual symptoms      Hiatal hernia      History of chronic pain      Peripheral neuropathy      S/P repair of paraesophageal hernia  2001      S/P spinal fusion  L4-L5 1966      Scoliosis  s/p placement of billings abigail x 2 1984      S/P spinal surgery  x 2 1985, 1986      Removal of pin, plate, abigail, or screw  1991- removal of billings abigail      S/P hysterectomy  1982      S/P hip replacement  left (2008)      S/P shoulder surgery  right (2012)      S/P dilatation and curettage  1981      H/O arthroscopy of knee  June 2021      S/P cataract surgery      History of bilateral hip replacements        FH:  FAMILY HISTORY:      MEDS:  MEDICATIONS  (STANDING):  (pramipexole) Mirapex 3.75 milliGRAM(s) 3.75 milliGRAM(s) Oral at bedtime  atorvastatin 10 milliGRAM(s) Oral at bedtime  enoxaparin Injectable 40 milliGRAM(s) SubCutaneous every 24 hours  famotidine   Suspension 40 milliGRAM(s) Oral daily  hyoscyamine SL 0.125 milliGRAM(s) SubLingual every 6 hours  lactated ringers. 1000 milliLiter(s) (100 mL/Hr) IV Continuous <Continuous>  losartan 100 milliGRAM(s) Oral daily  nystatin Powder 1 Application(s) Topical two times a day  pantoprazole   Suspension 40 milliGRAM(s) Oral every 12 hours  sucralfate suspension 1 Gram(s) Oral every 6 hours    MEDICATIONS  (PRN):    Allergies    Dilantin (Urticaria)  penicillins (Urticaria)    Intolerances    erythromycin (Stomach Upset; Vomiting; Nausea)      CONSTITUTIONAL:  No weight loss, fever, chills, weakness or fatigue.  HEENT:  Eyes:  No visual loss, blurred vision, double vision or yellow sclerae.  SKIN:  No rash or itching.  CARDIOVASCULAR: + chest/epigastric pain, no chest pressure or chest discomfort.  RESPIRATORY:  No shortness of breath, cough or sputum.  GASTROINTESTINAL:  SEE HPI  GENITOURINARY:  No dysuria, hematuria, urinary frequency  NEUROLOGICAL:  No headache, dizziness, syncope, paralysis, ataxia, numbness or tingling in the extremities.  MUSCULOSKELETAL:  No muscle, back pain, joint pain or stiffness.  ENDOCRINOLOGIC:  No reports of sweating, cold or heat intolerance. No polyuria or polydipsia.      ______________________________________________________________________  PHYSICAL EXAM:  T(C): 36.9 (12-31-23 @ 16:37), Max: 37.2 (12-31-23 @ 05:10)  HR: 100 (12-31-23 @ 16:37)  BP: 126/76 (12-31-23 @ 16:37)  RR: 18 (12-31-23 @ 16:37)  SpO2: 98% (12-31-23 @ 16:37)  Wt(kg): --    12-30 - 12-31  --------------------------------------------------------  IN:  Total IN: 0 mL    OUT:    Voided (mL): 1200 mL  Total OUT: 1200 mL    Total NET: -1200 mL      12-31 - 12-31  --------------------------------------------------------  IN:    IV PiggyBack: 300 mL    IV PiggyBack: 500 mL    IV PiggyBack: 50 mL    Lactated Ringers: 1200 mL  Total IN: 2050 mL    OUT:    Oral Fluid: 0 mL  Total OUT: 0 mL    Total NET: 2050 mL        GEN: NAD, normocephalic  MOUTH: No thrush   CVS: S1S2+  CHEST: clear to auscultation  ABD: soft , nontender, nondistended, bowel sounds present  EXTR: no cyanosis, no clubbing, no edema  NEURO: A&OX3  SKIN:  warm;  non icteric    ______________________________________________________________________  LABS:                        11.3   4.94  )-----------( 237      ( 31 Dec 2023 07:19 )             34.6     12-31    137  |  102  |  8   ----------------------------<  66<L>  3.3<L>   |  24  |  0.76    Ca    8.8      31 Dec 2023 07:27  Phos  1.7     12-31  Mg     1.7     12-31    TPro  6.6  /  Alb  3.6  /  TBili  0.4  /  DBili  x   /  AST  18  /  ALT  24  /  AlkPhos  128<H>  12-30    LIVER FUNCTIONS - ( 30 Dec 2023 17:05 )  Alb: 3.6 g/dL / Pro: 6.6 g/dL / ALK PHOS: 128 U/L / ALT: 24 U/L / AST: 18 U/L / GGT: x             ____________________________________________        < from: CT Chest w/ Oral Cont and w/ IV Cont (12.30.23 @ 22:33) >      INTERPRETATION:  CLINICAL INFORMATION: Epigastric pain.    COMPARISON: CT the abdomen and pelvis from 10/6/2023 and CTA of the chest   from 7/22/2007.    CONTRAST/COMPLICATIONS:  IV Contrast: Omnipaque 350 (accession [id number]), IV contrast documented   in unlinked concurrent exam (accession [id number])  90 cc administered   10   cc discarded  Oral Contrast: Omnipaque 300 (accession [id number]), NONE (accession   [id number])  Complications: None reported at time of study completion    PROCEDURE:  CT of the Chest, Abdomen and Pelvis was performed.  Sagittal and coronal reformats were performed.    FINDINGS:  CHEST:  LUNGS AND LARGE AIRWAYS: Patent central airways. No lung consolidation,   abnormal groundglass opacity, suspicious nodule, or mass. Bibasilar   dependent and platelike atelectasis.  PLEURA: No pleural effusion.  VESSELS: Main pulmonary artery is not dilated. Thoracic aorta is normal   in caliber. Atherosclerotic calcifications of the thoracic aorta, great   vessels, and coronary arteries.  HEART: Heart is normal in size. Trace pericardial effusion.  MEDIASTINUM AND PILAR: No lymphadenopathy. Thickening of the esophageal   walls and slight infiltration of the paraesophageal fat and few prominent   subcentimeter paraesophageal lymph nodes.  CHEST WALL AND LOWER NECK: Mild heterogeneity of the thyroid gland and   coarse calcification in the left thyroid lobe.    ABDOMEN AND PELVIS:  LIVER: Scattered hepatic cysts including a large multilobular right   hepatic lobe cyst measuring up to 7.5 cm.  BILE DUCTS: Normal caliber.  GALLBLADDER: Within normal limits.  SPLEEN: Within normal limits.  PANCREAS: Within normal limits.  ADRENALS: Within normal limits.  KIDNEYS/URETERS: Kidneys enhance symmetrically without hydronephrosis.   Contrast in the renal collecting systems.    BLADDER: Distended urinary bladder.  REPRODUCTIVE ORGANS: Uterus is absent. No pelvic mass or cyst.    BOWEL: High density debris in the colon limiting evaluation and resulting   in streak artifact. No bowel obstruction. Mild colonic diverticulosis   without evidenceof diverticulitis. Appendix is not visualized.  PERITONEUM: No ascites, pneumoperitoneum, or loculated collection. No   mesenteric lymphadenopathy.  VESSELS: Atherosclerotic calcifications of the aortoiliac tree. Normal   caliber abdominal aorta.  RETROPERITONEUM/LYMPH NODES: No lymphadenopathy.  ABDOMINAL WALL: Tiny fat-containing umbilical hernia. Postsurgical   changes.  BONES: Bilateral femoral arthroplasties. Degenerative changes at the   sacroiliac joints with mild symmetric widening of thesacroiliac joints.   Evidence of thoracolumbar fusion.    IMPRESSION:  No acute parenchymal or pleural lung disease.    Thickening of the esophageal walls and slight infiltration of the   paraesophageal fat and few prominent subcentimeter paraesophageal lymph   nodes likely reflective of an esophagitis.    No bowel obstruction. Mild colonic diverticulosis without evidence of   diverticulitis.    Hepatic cysts.    Distended urinary bladder.        < end of copied text >

## 2023-12-31 NOTE — PROGRESS NOTE ADULT - ATTENDING COMMENTS
73F s/p repair of recurrent hiatal hernia with toupet fundoplication ~ 1 month ago with Dr. Galaviz.  Patient reports severe reflux since surgery. Worse last few days so she presented to ED.  Says feels burning in chest most of the time, worse with food or liquids.  Does not feel she has difficulty swallowing, but burning and discomfort are making it difficult to eat or drink.  UGIS reviewed -- showed no recurrence or obstruction.  CT performed in ED reviewed -- shows no recurrence and wrap intact. No esophageal dilation. Contrast does not pool in esophagus.    On exam, VSS  Abd soft, NT, ND    Impression  REflux/heartburn s/p repair of recurrent hiatal hernia and toupet fundoplication without evidence or recurrence or obstruction.  ? esophageal spasm vs dysmotility vs edema  Started on pepcid/protonix with no improvement  Will start levsin  Will consult GI for possible EGD  Consider motility study if no improvement    Josh Marquez MD

## 2023-12-31 NOTE — PATIENT PROFILE ADULT - FALL HARM RISK - HARM RISK INTERVENTIONS
Assistance with ambulation/Assistance OOB with selected safe patient handling equipment/Communicate Risk of Fall with Harm to all staff/Discuss with provider need for PT consult/Monitor gait and stability/Provide patient with walking aids - walker, cane, crutches/Reinforce activity limits and safety measures with patient and family/Tailored Fall Risk Interventions/Visual Cue: Yellow wristband and red socks/Bed in lowest position, wheels locked, appropriate side rails in place/Call bell, personal items and telephone in reach/Instruct patient to call for assistance before getting out of bed or chair/Non-slip footwear when patient is out of bed/[place] to call system/Physically safe environment - no spills, clutter or unnecessary equipment/Purposeful Proactive Rounding/Room/bathroom lighting operational, light cord in reach Assistance with ambulation/Assistance OOB with selected safe patient handling equipment/Communicate Risk of Fall with Harm to all staff/Discuss with provider need for PT consult/Monitor gait and stability/Provide patient with walking aids - walker, cane, crutches/Reinforce activity limits and safety measures with patient and family/Tailored Fall Risk Interventions/Visual Cue: Yellow wristband and red socks/Bed in lowest position, wheels locked, appropriate side rails in place/Call bell, personal items and telephone in reach/Instruct patient to call for assistance before getting out of bed or chair/Non-slip footwear when patient is out of bed/[place] to call system/Physically safe environment - no spills, clutter or unnecessary equipment/Purposeful Proactive Rounding/Room/bathroom lighting operational, light cord in reach

## 2023-12-31 NOTE — PATIENT PROFILE ADULT - FALL HARM RISK - FACTORS NURSING JUDGEMENT
[FreeTextEntry1] : This patient complains of left testicular discomfort and enlargement over the past year or so.
Yes

## 2023-12-31 NOTE — CONSULT NOTE ADULT - ASSESSMENT
73 year old female with history of HTN, HLD, chronic GERD/LPR, recent laparoscopic hiatal hernia repair and fundoplication [11/30/2023] who presents with burning throat pain.  Patient follows with GI physician Dr. Gonzalez for chronic GERD/LPR for which she was previously taking PPI BID with famotidine at night with control of symptoms.  She underwent laparoscopic repair of hiatal hernia with fundoplication on 11/30/2023. Now with recurrent reflux and epigastric pain.      #Epigastric pain/ dyspepsia   - found to have esophageal thickening on CT- may indicated esophagitis causing some of the discomfort- ddx- erosive esophagitis from reflux vs infectious (ie viral vs fungal)  - negative UGIS for obstructing esophageal lesions  - If EGD negative then other etiologies to consider would be functional heartburn, esophageal dysmotility, ineffective esophageal motility, or EGJOO.    Recommendations:  -PPI BID  -sucralfate 1g oral suspension q6h  -can plan for inpatient EGD for Tuesday given concerning findings of pain waking her up and worsening of reflux despite PPI therapy + CT findings   -advance diet as tolerated  -NPO on Monday night   -if EGD negative then may need Bravo, +/- esophageal manometry/EndoFLIP as outpatient    Note incomplete until finalized by attending signature/attestation.    Asya Lowe MD  Gastroenterology/Hepatology Fellow, PGY-4  Please contact via TEAMS    NON-URGENT CONSULTS:  Please email [email or web address]  [email or web address]     73 year old female with history of HTN, HLD, chronic GERD/LPR, recent laparoscopic hiatal hernia repair and fundoplication [11/30/2023] who presents with burning throat pain.  Patient follows with GI physician Dr. Gonzalez for chronic GERD/LPR for which she was previously taking PPI BID with famotidine at night with control of symptoms.  She underwent laparoscopic repair of hiatal hernia with fundoplication on 11/30/2023. Now with recurrent reflux and epigastric pain.      #Epigastric pain/ dyspepsia   - found to have esophageal thickening on CT- may indicated esophagitis causing some of the discomfort- ddx- erosive esophagitis from reflux vs infectious (ie viral vs fungal)  - negative UGIS for obstructing esophageal lesions  - If EGD negative then other etiologies to consider would be functional heartburn, esophageal dysmotility, ineffective esophageal motility, or EGJOO.    Recommendations:  -PPI BID  -sucralfate 1g oral suspension q6h  -can plan for inpatient EGD for Tuesday given concerning findings of pain waking her up and worsening of reflux despite PPI therapy + CT findings   -advance diet as tolerated  -NPO on Monday night   -if EGD negative then may need Bravo, +/- esophageal manometry/EndoFLIP as outpatient    Note incomplete until finalized by attending signature/attestation.    Asya Lowe MD  Gastroenterology/Hepatology Fellow, PGY-4  Please contact via TEAMS    NON-URGENT CONSULTS:  Please email [email or web address]  [email or web address]

## 2023-12-31 NOTE — PROGRESS NOTE ADULT - ASSESSMENT
73F w/ hx of hiatal hernia repair in 2001 who developed hiatal hernia recurrence and now is s/p redo hiatal hernia repair with Toupet fundoplication on 11/30/23 with Dr. Galaviz. She has experienced worsening GERD symptoms since her last surgery, Upper GI study on 12/27 was unremarkable for mechanical etiology of her symptoms. Patient representing to the ED with persistent/worsening GERD symptoms despite anti-reflux medication optimization.    PLAN:  - IV hydration in the setting of poor PO intake  - Anti-reflux medication: Omeprazole, Pepcid, and Carafate suspension  - Gi consultation for evaluation for possible upper endoscopy for luminal evaluation.  - DVT ppx    [facility]   [id number] 73F w/ hx of hiatal hernia repair in 2001 who developed hiatal hernia recurrence and now is s/p redo hiatal hernia repair with Toupet fundoplication on 11/30/23 with Dr. Galaviz. She has experienced worsening GERD symptoms since her last surgery, Upper GI study on 12/27 was unremarkable for mechanical etiology of her symptoms. Patient representing to the ED with persistent/worsening GERD symptoms despite anti-reflux medication optimization.    PLAN:  - IV hydration in the setting of poor PO intake  - Anti-reflux medication: Omeprazole, Pepcid, and Carafate suspension  - Gi consultation for evaluation for possible upper endoscopy for luminal evaluation.  - DVT ppx    [facility]   [id number] 73F w/ hx of hiatal hernia repair in 2001 who developed hiatal hernia recurrence and now is s/p redo hiatal hernia repair with Toupet fundoplication on 11/30/23 with Dr. Galaviz. She has experienced worsening GERD symptoms since her last surgery, Upper GI study on 12/27 was unremarkable for mechanical etiology of her symptoms. Patient representing to the ED with persistent/worsening GERD symptoms despite anti-reflux medication optimization.    PLAN:  - NPO/IVF  - Anti-reflux medication: Omeprazole, Pepcid, and Carafate suspension  - GI consulted for evaluation for possible upper endoscopy for luminal evaluation -> F/u recs  - DVT ppx    [place] [facility]   [id number] 73F w/ hx of hiatal hernia repair in 2001 who developed hiatal hernia recurrence and now is s/p redo hiatal hernia repair with Toupet fundoplication on 11/30/23 with Dr. Galaviz. She has experienced worsening GERD symptoms since her last surgery, Upper GI study on 12/27 was unremarkable for mechanical etiology of her symptoms. Patient representing to the ED with persistent/worsening GERD symptoms despite anti-reflux medication optimization.    PLAN:  - NPO/IVF  - Anti-reflux medication: Omeprazole, Pepcid, and Carafate suspension  - GI consulted for evaluation for possible upper endoscopy for luminal evaluation -> F/u recs  - DVT ppx    [place] [facility]   [id number] 73F w/ hx of hiatal hernia repair in 2001 who developed hiatal hernia recurrence and now is s/p redo hiatal hernia repair with Toupet fundoplication on 11/30/23 with Dr. Galaviz. She has experienced worsening GERD symptoms since her last surgery, Upper GI study on 12/27 was unremarkable for mechanical etiology of her symptoms. Patient representing to the ED with persistent/worsening GERD symptoms despite anti-reflux medication optimization.    PLAN:  - NPO/IVF  - Anti-reflux medication: Omeprazole, Pepcid, and Carafate suspension           - Add standing levsin  - GI consulted for evaluation for possible upper endoscopy for luminal evaluation -> F/u recs  - DVT ppx    [facility]   [id number] 73F w/ hx of hiatal hernia repair in 2001 who developed hiatal hernia recurrence and now is s/p redo hiatal hernia repair with Toupet fundoplication on 11/30/23 with Dr. Galaviz. She has experienced worsening GERD symptoms since her last surgery, Upper GI study on 12/27 was unremarkable for mechanical etiology of her symptoms. Patient representing to the ED with persistent/worsening GERD symptoms despite anti-reflux medication optimization.    PLAN:  - NPO/IVF  - Anti-reflux medication: Omeprazole, Pepcid, and Carafate suspension           - Add standing levsin  - GI consulted for evaluation for possible upper endoscopy for luminal evaluation -> F/u recs  - DVT ppx    [facility]   [id number]

## 2023-12-31 NOTE — CONSULT NOTE ADULT - ATTENDING COMMENTS
73F, hx of GERD, s/p HH repair in November 2023, admitted with abdominal pain, heartburn, odynophagia.   Improved today.   Ate oatmeal for breakfast without issues   Burning pain when having liquids     Continue PPI, H2 blockers, carafate   Diet as tolerated   NPO at [facility] for possible EGD tomorrow

## 2023-12-31 NOTE — PROGRESS NOTE ADULT - SUBJECTIVE AND OBJECTIVE BOX
Surgery Progress Note     Subjective:  Patient seen and examined.     Vital Signs:  Vital Signs Last 24 Hrs  T(C): 36.8 (31 Dec 2023 01:38), Max: 36.8 (31 Dec 2023 00:59)  T(F): 98.2 (31 Dec 2023 01:38), Max: 98.2 (31 Dec 2023 00:59)  HR: 93 (31 Dec 2023 01:38) (79 - 96)  BP: 158/92 (31 Dec 2023 01:38) (138/84 - 162/84)  BP(mean): 110 (31 Dec 2023 00:59) (110 - 110)  RR: 18 (31 Dec 2023 01:38) (18 - 20)  SpO2: 98% (31 Dec 2023 01:38) (94% - 98%)    Parameters below as of 31 Dec 2023 01:38  Patient On (Oxygen Delivery Method): room air    Physical Exam:  General: NAD, Sitting in bed comfortably  Cardio: RRR  Resp: Good effort, CTA b/l  GI/Abd: Soft, NT/ND, no rebound/guarding, no masses palpated, laparoscopic scars well healed  Musculoskeletal: All 4 extremities moving spontaneously, no limitations    Labs:    12-30    139  |  99  |  18  ----------------------------<  100<H>  3.5   |  25  |  0.77    Ca    9.2      30 Dec 2023 17:05    TPro  6.6  /  Alb  3.6  /  TBili  0.4  /  DBili  x   /  AST  18  /  ALT  24  /  AlkPhos  128<H>  12-30    LIVER FUNCTIONS - ( 30 Dec 2023 17:05 )  Alb: 3.6 g/dL / Pro: 6.6 g/dL / ALK PHOS: 128 U/L / ALT: 24 U/L / AST: 18 U/L / GGT: x                                 12.3   5.27  )-----------( 272      ( 30 Dec 2023 17:05 )             38.9      Surgery Progress Note     Subjective:  Patient seen and examined. States she was coughing throughout the night. Reports reflux medications have only temporarily relieved symptoms, but cough returns.    Vital Signs:  Vital Signs Last 24 Hrs  T(C): 36.8 (31 Dec 2023 01:38), Max: 36.8 (31 Dec 2023 00:59)  T(F): 98.2 (31 Dec 2023 01:38), Max: 98.2 (31 Dec 2023 00:59)  HR: 93 (31 Dec 2023 01:38) (79 - 96)  BP: 158/92 (31 Dec 2023 01:38) (138/84 - 162/84)  BP(mean): 110 (31 Dec 2023 00:59) (110 - 110)  RR: 18 (31 Dec 2023 01:38) (18 - 20)  SpO2: 98% (31 Dec 2023 01:38) (94% - 98%)    Parameters below as of 31 Dec 2023 01:38  Patient On (Oxygen Delivery Method): room air    Physical Exam:  General: NAD, Sitting in bed comfortably  Resp: Breathing comfortably on room air  GI/Abd: Soft, nontender, nondistended, laparoscopic incisions well-healed      Labs:    12-30    139  |  99  |  18  ----------------------------<  100<H>  3.5   |  25  |  0.77    Ca    9.2      30 Dec 2023 17:05    TPro  6.6  /  Alb  3.6  /  TBili  0.4  /  DBili  x   /  AST  18  /  ALT  24  /  AlkPhos  128<H>  12-30    LIVER FUNCTIONS - ( 30 Dec 2023 17:05 )  Alb: 3.6 g/dL / Pro: 6.6 g/dL / ALK PHOS: 128 U/L / ALT: 24 U/L / AST: 18 U/L / GGT: x                                 12.3   5.27  )-----------( 272      ( 30 Dec 2023 17:05 )             38.9      Surgery Progress Note     Subjective:  Patient seen and examined. States she was coughing throughout the night. Reports reflux medications have only temporarily relieved symptoms, but cough returns. Otherwise endorses passing flatus, having BM. No abdominal pain.    Vital Signs:  Vital Signs Last 24 Hrs  T(C): 36.8 (31 Dec 2023 01:38), Max: 36.8 (31 Dec 2023 00:59)  T(F): 98.2 (31 Dec 2023 01:38), Max: 98.2 (31 Dec 2023 00:59)  HR: 93 (31 Dec 2023 01:38) (79 - 96)  BP: 158/92 (31 Dec 2023 01:38) (138/84 - 162/84)  BP(mean): 110 (31 Dec 2023 00:59) (110 - 110)  RR: 18 (31 Dec 2023 01:38) (18 - 20)  SpO2: 98% (31 Dec 2023 01:38) (94% - 98%)    Parameters below as of 31 Dec 2023 01:38  Patient On (Oxygen Delivery Method): room air    Physical Exam:  General: NAD, Sitting in bed comfortably, coughing  Resp: Breathing comfortably on room air  GI/Abd: Soft, nontender, nondistended, laparoscopic incisions well-healed      Labs:    12-30    139  |  99  |  18  ----------------------------<  100<H>  3.5   |  25  |  0.77    Ca    9.2      30 Dec 2023 17:05    TPro  6.6  /  Alb  3.6  /  TBili  0.4  /  DBili  x   /  AST  18  /  ALT  24  /  AlkPhos  128<H>  12-30    LIVER FUNCTIONS - ( 30 Dec 2023 17:05 )  Alb: 3.6 g/dL / Pro: 6.6 g/dL / ALK PHOS: 128 U/L / ALT: 24 U/L / AST: 18 U/L / GGT: x                                 12.3   5.27  )-----------( 272      ( 30 Dec 2023 17:05 )             38.9

## 2024-01-01 LAB
ANION GAP SERPL CALC-SCNC: 13 MMOL/L — SIGNIFICANT CHANGE UP (ref 5–17)
ANION GAP SERPL CALC-SCNC: 13 MMOL/L — SIGNIFICANT CHANGE UP (ref 5–17)
BUN SERPL-MCNC: 16 MG/DL — SIGNIFICANT CHANGE UP (ref 7–23)
BUN SERPL-MCNC: 16 MG/DL — SIGNIFICANT CHANGE UP (ref 7–23)
CALCIUM SERPL-MCNC: 8.3 MG/DL — LOW (ref 8.4–10.5)
CALCIUM SERPL-MCNC: 8.3 MG/DL — LOW (ref 8.4–10.5)
CHLORIDE SERPL-SCNC: 105 MMOL/L — SIGNIFICANT CHANGE UP (ref 96–108)
CHLORIDE SERPL-SCNC: 105 MMOL/L — SIGNIFICANT CHANGE UP (ref 96–108)
CO2 SERPL-SCNC: 22 MMOL/L — SIGNIFICANT CHANGE UP (ref 22–31)
CO2 SERPL-SCNC: 22 MMOL/L — SIGNIFICANT CHANGE UP (ref 22–31)
CREAT SERPL-MCNC: 0.78 MG/DL — SIGNIFICANT CHANGE UP (ref 0.5–1.3)
CREAT SERPL-MCNC: 0.78 MG/DL — SIGNIFICANT CHANGE UP (ref 0.5–1.3)
EGFR: 79 ML/MIN/1.73M2 — SIGNIFICANT CHANGE UP
EGFR: 79 ML/MIN/1.73M2 — SIGNIFICANT CHANGE UP
GLUCOSE SERPL-MCNC: 134 MG/DL — HIGH (ref 70–99)
GLUCOSE SERPL-MCNC: 134 MG/DL — HIGH (ref 70–99)
HCT VFR BLD CALC: 30.6 % — LOW (ref 34.5–45)
HCT VFR BLD CALC: 30.6 % — LOW (ref 34.5–45)
HGB BLD-MCNC: 10.1 G/DL — LOW (ref 11.5–15.5)
HGB BLD-MCNC: 10.1 G/DL — LOW (ref 11.5–15.5)
MAGNESIUM SERPL-MCNC: 2 MG/DL — SIGNIFICANT CHANGE UP (ref 1.6–2.6)
MAGNESIUM SERPL-MCNC: 2 MG/DL — SIGNIFICANT CHANGE UP (ref 1.6–2.6)
MCHC RBC-ENTMCNC: 28.8 PG — SIGNIFICANT CHANGE UP (ref 27–34)
MCHC RBC-ENTMCNC: 28.8 PG — SIGNIFICANT CHANGE UP (ref 27–34)
MCHC RBC-ENTMCNC: 33 GM/DL — SIGNIFICANT CHANGE UP (ref 32–36)
MCHC RBC-ENTMCNC: 33 GM/DL — SIGNIFICANT CHANGE UP (ref 32–36)
MCV RBC AUTO: 87.2 FL — SIGNIFICANT CHANGE UP (ref 80–100)
MCV RBC AUTO: 87.2 FL — SIGNIFICANT CHANGE UP (ref 80–100)
NRBC # BLD: 0 /100 WBCS — SIGNIFICANT CHANGE UP (ref 0–0)
NRBC # BLD: 0 /100 WBCS — SIGNIFICANT CHANGE UP (ref 0–0)
PHOSPHATE SERPL-MCNC: 2 MG/DL — LOW (ref 2.5–4.5)
PHOSPHATE SERPL-MCNC: 2 MG/DL — LOW (ref 2.5–4.5)
PLATELET # BLD AUTO: 227 K/UL — SIGNIFICANT CHANGE UP (ref 150–400)
PLATELET # BLD AUTO: 227 K/UL — SIGNIFICANT CHANGE UP (ref 150–400)
POTASSIUM SERPL-MCNC: 3.5 MMOL/L — SIGNIFICANT CHANGE UP (ref 3.5–5.3)
POTASSIUM SERPL-MCNC: 3.5 MMOL/L — SIGNIFICANT CHANGE UP (ref 3.5–5.3)
POTASSIUM SERPL-SCNC: 3.5 MMOL/L — SIGNIFICANT CHANGE UP (ref 3.5–5.3)
POTASSIUM SERPL-SCNC: 3.5 MMOL/L — SIGNIFICANT CHANGE UP (ref 3.5–5.3)
RBC # BLD: 3.51 M/UL — LOW (ref 3.8–5.2)
RBC # BLD: 3.51 M/UL — LOW (ref 3.8–5.2)
RBC # FLD: 15.2 % — HIGH (ref 10.3–14.5)
RBC # FLD: 15.2 % — HIGH (ref 10.3–14.5)
SODIUM SERPL-SCNC: 140 MMOL/L — SIGNIFICANT CHANGE UP (ref 135–145)
SODIUM SERPL-SCNC: 140 MMOL/L — SIGNIFICANT CHANGE UP (ref 135–145)
WBC # BLD: 5.22 K/UL — SIGNIFICANT CHANGE UP (ref 3.8–10.5)
WBC # BLD: 5.22 K/UL — SIGNIFICANT CHANGE UP (ref 3.8–10.5)
WBC # FLD AUTO: 5.22 K/UL — SIGNIFICANT CHANGE UP (ref 3.8–10.5)
WBC # FLD AUTO: 5.22 K/UL — SIGNIFICANT CHANGE UP (ref 3.8–10.5)

## 2024-01-01 PROCEDURE — 99231 SBSQ HOSP IP/OBS SF/LOW 25: CPT | Mod: GC

## 2024-01-01 PROCEDURE — 99232 SBSQ HOSP IP/OBS MODERATE 35: CPT | Mod: GC

## 2024-01-01 RX ORDER — POTASSIUM PHOSPHATE, MONOBASIC POTASSIUM PHOSPHATE, DIBASIC 236; 224 MG/ML; MG/ML
30 INJECTION, SOLUTION INTRAVENOUS ONCE
Refills: 0 | Status: COMPLETED | OUTPATIENT
Start: 2024-01-01 | End: 2024-01-01

## 2024-01-01 RX ORDER — BENZOCAINE AND MENTHOL 5; 1 G/100ML; G/100ML
1 LIQUID ORAL ONCE
Refills: 0 | Status: COMPLETED | OUTPATIENT
Start: 2024-01-01 | End: 2024-01-01

## 2024-01-01 RX ADMIN — Medication 1 GRAM(S): at 17:08

## 2024-01-01 RX ADMIN — Medication 0.12 MILLIGRAM(S): at 11:46

## 2024-01-01 RX ADMIN — ENOXAPARIN SODIUM 40 MILLIGRAM(S): 100 INJECTION SUBCUTANEOUS at 17:08

## 2024-01-01 RX ADMIN — FAMOTIDINE 40 MILLIGRAM(S): 10 INJECTION INTRAVENOUS at 11:46

## 2024-01-01 RX ADMIN — BENZOCAINE AND MENTHOL 1 LOZENGE: 5; 1 LIQUID ORAL at 01:23

## 2024-01-01 RX ADMIN — PANTOPRAZOLE SODIUM 40 MILLIGRAM(S): 20 TABLET, DELAYED RELEASE ORAL at 05:55

## 2024-01-01 RX ADMIN — NYSTATIN CREAM 1 APPLICATION(S): 100000 CREAM TOPICAL at 05:56

## 2024-01-01 RX ADMIN — LOSARTAN POTASSIUM 100 MILLIGRAM(S): 100 TABLET, FILM COATED ORAL at 05:56

## 2024-01-01 RX ADMIN — Medication 0.12 MILLIGRAM(S): at 05:54

## 2024-01-01 RX ADMIN — NYSTATIN CREAM 1 APPLICATION(S): 100000 CREAM TOPICAL at 17:08

## 2024-01-01 RX ADMIN — Medication 0.12 MILLIGRAM(S): at 23:28

## 2024-01-01 RX ADMIN — PANTOPRAZOLE SODIUM 40 MILLIGRAM(S): 20 TABLET, DELAYED RELEASE ORAL at 17:08

## 2024-01-01 RX ADMIN — ATORVASTATIN CALCIUM 10 MILLIGRAM(S): 80 TABLET, FILM COATED ORAL at 22:25

## 2024-01-01 RX ADMIN — Medication 0.12 MILLIGRAM(S): at 17:08

## 2024-01-01 RX ADMIN — Medication 1 GRAM(S): at 11:46

## 2024-01-01 RX ADMIN — Medication 1 GRAM(S): at 23:30

## 2024-01-01 RX ADMIN — Medication 1 GRAM(S): at 05:54

## 2024-01-01 RX ADMIN — POTASSIUM PHOSPHATE, MONOBASIC POTASSIUM PHOSPHATE, DIBASIC 83.33 MILLIMOLE(S): 236; 224 INJECTION, SOLUTION INTRAVENOUS at 17:08

## 2024-01-01 NOTE — PROGRESS NOTE ADULT - SUBJECTIVE AND OBJECTIVE BOX
Surgery Progress Note     Overnight: Patient requested stool softener, Miralax offered but patient requesting senna    Subjective:  Patient seen and examined bedside during morning rounds. States notes some coughing overnight. Reports reflux medications have only temporarily relieved symptoms. Passing BMs, having stools. Denies any fever, chills, vomiting, diarrhea, SOB, or chest pain.     Vital Signs:  Vital Signs Last 24 Hrs  T(C): 36.8 (31 Dec 2023 20:59), Max: 37.2 (31 Dec 2023 05:10)  T(F): 98.2 (31 Dec 2023 20:59), Max: 98.9 (31 Dec 2023 05:10)  HR: 92 (31 Dec 2023 20:59) (75 - 100)  BP: 134/70 (31 Dec 2023 20:59) (126/76 - 162/84)  BP(mean): 110 (31 Dec 2023 00:59) (110 - 110)  RR: 18 (31 Dec 2023 20:59) (18 - 18)  SpO2: 96% (31 Dec 2023 20:59) (94% - 98%)    Parameters below as of 31 Dec 2023 20:59  Patient On (Oxygen Delivery Method): room air        Physical Exam:  General: NAD, Sitting in bed comfortably, coughing  Resp: Breathing comfortably on room air  GI/Abd: Soft, nontender, nondistended, laparoscopic incisions well-healed      Labs:                          11.3   4.94  )-----------( 237      ( 31 Dec 2023 07:19 )             34.6   12-31      137  |  102  |  8   ----------------------------<  66<L>  3.3<L>   |  24  |  0.76    Ca    8.8      31 Dec 2023 07:27  Phos  1.7     12-31  Mg     1.7     12-31    TPro  6.6  /  Alb  3.6  /  TBili  0.4  /  DBili  x   /  AST  18  /  ALT  24  /  AlkPhos  128<H>  12-30      I&O's Summary    30 Dec 2023 07:01  -  31 Dec 2023 07:00  --------------------------------------------------------  IN: 0 mL / OUT: 1200 mL / NET: -1200 mL    31 Dec 2023 07:01  -  01 Jan 2024 00:42  --------------------------------------------------------  IN: 2050 mL / OUT: 600 mL / NET: 1450 mL           Surgery Progress Note     Overnight: Patient requested stool softener, Miralax offered but patient requesting senna    Subjective:  Patient seen and examined bedside during morning rounds. States she is still coughing often, worse overnight. Reports reflux medications have only temporarily relieved symptoms, continues to have pain in throat and burning in chest. Tolerated some food yesterday. Having BMs, passing flatus. Denies any fever, chills, vomiting, diarrhea, SOB, or chest pain.     Vital Signs:  Vital Signs Last 24 Hrs  T(C): 36.8 (31 Dec 2023 20:59), Max: 37.2 (31 Dec 2023 05:10)  T(F): 98.2 (31 Dec 2023 20:59), Max: 98.9 (31 Dec 2023 05:10)  HR: 92 (31 Dec 2023 20:59) (75 - 100)  BP: 134/70 (31 Dec 2023 20:59) (126/76 - 162/84)  BP(mean): 110 (31 Dec 2023 00:59) (110 - 110)  RR: 18 (31 Dec 2023 20:59) (18 - 18)  SpO2: 96% (31 Dec 2023 20:59) (94% - 98%)    Parameters below as of 31 Dec 2023 20:59  Patient On (Oxygen Delivery Method): room air        Physical Exam:  General: NAD, Sitting in bed comfortably, coughing  Resp: Breathing comfortably on room air  GI/Abd: Soft, nontender, nondistended, laparoscopic incisions well-healed      Labs:                          11.3   4.94  )-----------( 237      ( 31 Dec 2023 07:19 )             34.6   12-31      137  |  102  |  8   ----------------------------<  66<L>  3.3<L>   |  24  |  0.76    Ca    8.8      31 Dec 2023 07:27  Phos  1.7     12-31  Mg     1.7     12-31    TPro  6.6  /  Alb  3.6  /  TBili  0.4  /  DBili  x   /  AST  18  /  ALT  24  /  AlkPhos  128<H>  12-30      I&O's Summary    30 Dec 2023 07:01  -  31 Dec 2023 07:00  --------------------------------------------------------  IN: 0 mL / OUT: 1200 mL / NET: -1200 mL    31 Dec 2023 07:01  -  01 Jan 2024 00:42  --------------------------------------------------------  IN: 2050 mL / OUT: 600 mL / NET: 1450 mL

## 2024-01-01 NOTE — CHART NOTE - NSCHARTNOTEFT_GEN_A_CORE
Tentative plan for EGD tomorrow  - please make NPO at MN  - labs in Am with correction of electrolytes  - hbg >7     Asya Lowe MD  Gastroenterology/Hepatology Fellow, PGY-4  Please contact via TEAMS    NON-URGENT CONSULTS:  Please email [email or web address] Tentative plan for EGD tomorrow  - please make NPO at MN  - labs in Am with correction of electrolytes  - hbg >7     Asya Lowe MD  Gastroenterology/Hepatology Fellow, PGY-4  Please contact via TEAMS    NON-URGENT CONSULTS:  Please email [email or web address]

## 2024-01-01 NOTE — ED CDU PROVIDER DISPOSITION NOTE - NSPTACCESSSVCSAPPT_ED_ALL_ED
Goal Outcome Evaluation:                 Infant bonding with mother and father. Voiding and stooling. Breast feeding and formula feeding, no signs of infant distress noted throughout shift. Okay to d/c per peds. Parents verbalized understanding of d/c education                               Specialty Care (Immediate)...

## 2024-01-01 NOTE — PROGRESS NOTE ADULT - ASSESSMENT
73F w/ hx of hiatal hernia repair in 2001 who developed hiatal hernia recurrence and now is s/p redo hiatal hernia repair with Toupet fundoplication on 11/30/23 with Dr. Galaviz. She has experienced worsening GERD symptoms since her last surgery, Upper GI study on 12/27 was unremarkable for mechanical etiology of her symptoms. Patient representing to the ED with persistent/worsening GERD symptoms despite anti-reflux medication optimization.    PLAN:  - Possible scope 1/2  - Regular diet  - GI consulted, appreciate recommendations  - Pepcid QD  -PPI BID  -sucralfate 1g oral suspension q6h  -advance diet as tolerated  -NPO on Monday night   -if EGD negative then may need  - DVT ppx    [facility]   [id number] 73F w/ hx of hiatal hernia repair in 2001 who developed hiatal hernia recurrence and now is s/p redo hiatal hernia repair with Toupet fundoplication on 11/30/23 with Dr. Galaviz. She has experienced worsening GERD symptoms since her last surgery, Upper GI study on 12/27 was unremarkable for mechanical etiology of her symptoms. Patient representing to the ED with persistent/worsening GERD symptoms despite anti-reflux medication optimization.    PLAN:  - Possible scope 1/2  - Regular diet  - GI consulted, appreciate recommendations  - Pepcid QD  -PPI BID  -sucralfate 1g oral suspension q6h  -advance diet as tolerated  -NPO on Monday night   -if EGD negative then may need  - DVT ppx    [facility]   [id number] 73F w/ hx of hiatal hernia repair in 2001 who developed hiatal hernia recurrence and now is s/p redo hiatal hernia repair with Toupet fundoplication on 11/30/23 with Dr. Galaviz. She has experienced worsening GERD symptoms since her last surgery, Upper GI study on 12/27 was unremarkable for mechanical etiology of her symptoms. Patient representing to the ED with persistent/worsening GERD symptoms despite anti-reflux medication optimization.    PLAN:  - Possible scope 1/2  - Regular diet  - GI consulted, appreciate recommendations  - Pepcid QD  -PPI BID  -sucralfate 1g oral suspension q6h  -advance diet as tolerated  -NPO midnight  - DVT ppx    [facility]   [id number] 73F w/ hx of hiatal hernia repair in 2001 who developed hiatal hernia recurrence and now is s/p redo hiatal hernia repair with Toupet fundoplication on 11/30/23 with Dr. Galaviz. She has experienced worsening GERD symptoms since her last surgery, Upper GI study on 12/27 was unremarkable for mechanical etiology of her symptoms. Patient representing to the ED with persistent/worsening GERD symptoms despite anti-reflux medication optimization.    PLAN:  - Possible scope 1/2  - Regular diet  - GI consulted, appreciate recommendations  - Pepcid QD  -PPI BID  -sucralfate 1g oral suspension q6h  -advance diet as tolerated  -NPO midnight  - DVT ppx    [facility]   [id number] 73F w/ hx of hiatal hernia repair in 2001 who developed hiatal hernia recurrence and now is s/p redo hiatal hernia repair with Toupet fundoplication on 11/30/23 with Dr. Galaviz. She has experienced worsening GERD symptoms since her last surgery, Upper GI study on 12/27 was unremarkable for mechanical etiology of her symptoms. Patient representing to the ED with persistent/worsening GERD symptoms despite anti-reflux medication optimization.    PLAN:  - Possible scope tomorrow 1/2  - Regular diet, NPO at midnight  - GI consulted, appreciate recommendations  - Pepcid QD  - PPI BID  - sucralfate 1g oral suspension q6h  - DVT ppx    [facility]   [id number] 73F w/ hx of hiatal hernia repair in 2001 who developed hiatal hernia recurrence and now is s/p redo hiatal hernia repair with Toupet fundoplication on 11/30/23 with Dr. Galaviz. She has experienced worsening GERD symptoms since her last surgery, Upper GI study on 12/27 was unremarkable for mechanical etiology of her symptoms. Patient representing to the ED with persistent/worsening GERD symptoms despite anti-reflux medication optimization.    PLAN:  - Possible scope tomorrow 1/2  - Regular diet, NPO at midnight  - GI consulted, appreciate recommendations  - Pepcid QD  - PPI BID  - sucralfate 1g oral suspension q6h  - DVT ppx    [facility]   [id number]

## 2024-01-02 ENCOUNTER — NON-APPOINTMENT (OUTPATIENT)
Age: 74
End: 2024-01-02

## 2024-01-02 ENCOUNTER — TRANSCRIPTION ENCOUNTER (OUTPATIENT)
Age: 74
End: 2024-01-02

## 2024-01-02 LAB
ANION GAP SERPL CALC-SCNC: 8 MMOL/L — SIGNIFICANT CHANGE UP (ref 5–17)
ANION GAP SERPL CALC-SCNC: 8 MMOL/L — SIGNIFICANT CHANGE UP (ref 5–17)
APTT BLD: 30.1 SEC — SIGNIFICANT CHANGE UP (ref 24.5–35.6)
APTT BLD: 30.1 SEC — SIGNIFICANT CHANGE UP (ref 24.5–35.6)
BLD GP AB SCN SERPL QL: NEGATIVE — SIGNIFICANT CHANGE UP
BLD GP AB SCN SERPL QL: NEGATIVE — SIGNIFICANT CHANGE UP
BUN SERPL-MCNC: 12 MG/DL — SIGNIFICANT CHANGE UP (ref 7–23)
BUN SERPL-MCNC: 12 MG/DL — SIGNIFICANT CHANGE UP (ref 7–23)
CALCIUM SERPL-MCNC: 8.7 MG/DL — SIGNIFICANT CHANGE UP (ref 8.4–10.5)
CALCIUM SERPL-MCNC: 8.7 MG/DL — SIGNIFICANT CHANGE UP (ref 8.4–10.5)
CHLORIDE SERPL-SCNC: 108 MMOL/L — SIGNIFICANT CHANGE UP (ref 96–108)
CHLORIDE SERPL-SCNC: 108 MMOL/L — SIGNIFICANT CHANGE UP (ref 96–108)
CO2 SERPL-SCNC: 26 MMOL/L — SIGNIFICANT CHANGE UP (ref 22–31)
CO2 SERPL-SCNC: 26 MMOL/L — SIGNIFICANT CHANGE UP (ref 22–31)
CREAT SERPL-MCNC: 0.87 MG/DL — SIGNIFICANT CHANGE UP (ref 0.5–1.3)
CREAT SERPL-MCNC: 0.87 MG/DL — SIGNIFICANT CHANGE UP (ref 0.5–1.3)
EGFR: 69 ML/MIN/1.73M2 — SIGNIFICANT CHANGE UP
EGFR: 69 ML/MIN/1.73M2 — SIGNIFICANT CHANGE UP
GLUCOSE SERPL-MCNC: 112 MG/DL — HIGH (ref 70–99)
GLUCOSE SERPL-MCNC: 112 MG/DL — HIGH (ref 70–99)
HCT VFR BLD CALC: 33.6 % — LOW (ref 34.5–45)
HCT VFR BLD CALC: 33.6 % — LOW (ref 34.5–45)
HGB BLD-MCNC: 10.8 G/DL — LOW (ref 11.5–15.5)
HGB BLD-MCNC: 10.8 G/DL — LOW (ref 11.5–15.5)
INR BLD: 0.98 RATIO — SIGNIFICANT CHANGE UP (ref 0.85–1.18)
INR BLD: 0.98 RATIO — SIGNIFICANT CHANGE UP (ref 0.85–1.18)
MAGNESIUM SERPL-MCNC: 1.8 MG/DL — SIGNIFICANT CHANGE UP (ref 1.6–2.6)
MAGNESIUM SERPL-MCNC: 1.8 MG/DL — SIGNIFICANT CHANGE UP (ref 1.6–2.6)
MCHC RBC-ENTMCNC: 28.4 PG — SIGNIFICANT CHANGE UP (ref 27–34)
MCHC RBC-ENTMCNC: 28.4 PG — SIGNIFICANT CHANGE UP (ref 27–34)
MCHC RBC-ENTMCNC: 32.1 GM/DL — SIGNIFICANT CHANGE UP (ref 32–36)
MCHC RBC-ENTMCNC: 32.1 GM/DL — SIGNIFICANT CHANGE UP (ref 32–36)
MCV RBC AUTO: 88.4 FL — SIGNIFICANT CHANGE UP (ref 80–100)
MCV RBC AUTO: 88.4 FL — SIGNIFICANT CHANGE UP (ref 80–100)
NRBC # BLD: 0 /100 WBCS — SIGNIFICANT CHANGE UP (ref 0–0)
NRBC # BLD: 0 /100 WBCS — SIGNIFICANT CHANGE UP (ref 0–0)
PHOSPHATE SERPL-MCNC: 2.5 MG/DL — SIGNIFICANT CHANGE UP (ref 2.5–4.5)
PHOSPHATE SERPL-MCNC: 2.5 MG/DL — SIGNIFICANT CHANGE UP (ref 2.5–4.5)
PLATELET # BLD AUTO: 231 K/UL — SIGNIFICANT CHANGE UP (ref 150–400)
PLATELET # BLD AUTO: 231 K/UL — SIGNIFICANT CHANGE UP (ref 150–400)
POTASSIUM SERPL-MCNC: 4.1 MMOL/L — SIGNIFICANT CHANGE UP (ref 3.5–5.3)
POTASSIUM SERPL-MCNC: 4.1 MMOL/L — SIGNIFICANT CHANGE UP (ref 3.5–5.3)
POTASSIUM SERPL-SCNC: 4.1 MMOL/L — SIGNIFICANT CHANGE UP (ref 3.5–5.3)
POTASSIUM SERPL-SCNC: 4.1 MMOL/L — SIGNIFICANT CHANGE UP (ref 3.5–5.3)
PROTHROM AB SERPL-ACNC: 10.8 SEC — SIGNIFICANT CHANGE UP (ref 9.5–13)
PROTHROM AB SERPL-ACNC: 10.8 SEC — SIGNIFICANT CHANGE UP (ref 9.5–13)
RBC # BLD: 3.8 M/UL — SIGNIFICANT CHANGE UP (ref 3.8–5.2)
RBC # BLD: 3.8 M/UL — SIGNIFICANT CHANGE UP (ref 3.8–5.2)
RBC # FLD: 15.1 % — HIGH (ref 10.3–14.5)
RBC # FLD: 15.1 % — HIGH (ref 10.3–14.5)
RH IG SCN BLD-IMP: NEGATIVE — SIGNIFICANT CHANGE UP
RH IG SCN BLD-IMP: NEGATIVE — SIGNIFICANT CHANGE UP
SODIUM SERPL-SCNC: 142 MMOL/L — SIGNIFICANT CHANGE UP (ref 135–145)
SODIUM SERPL-SCNC: 142 MMOL/L — SIGNIFICANT CHANGE UP (ref 135–145)
WBC # BLD: 4.33 K/UL — SIGNIFICANT CHANGE UP (ref 3.8–10.5)
WBC # BLD: 4.33 K/UL — SIGNIFICANT CHANGE UP (ref 3.8–10.5)
WBC # FLD AUTO: 4.33 K/UL — SIGNIFICANT CHANGE UP (ref 3.8–10.5)
WBC # FLD AUTO: 4.33 K/UL — SIGNIFICANT CHANGE UP (ref 3.8–10.5)

## 2024-01-02 PROCEDURE — 78264 GASTRIC EMPTYING IMG STUDY: CPT | Mod: 26,GC

## 2024-01-02 PROCEDURE — 99232 SBSQ HOSP IP/OBS MODERATE 35: CPT | Mod: GC

## 2024-01-02 PROCEDURE — 44360 SMALL BOWEL ENDOSCOPY: CPT | Mod: GC

## 2024-01-02 RX ORDER — DEXTROSE MONOHYDRATE, SODIUM CHLORIDE, AND POTASSIUM CHLORIDE 50; .745; 4.5 G/1000ML; G/1000ML; G/1000ML
1000 INJECTION, SOLUTION INTRAVENOUS
Refills: 0 | Status: DISCONTINUED | OUTPATIENT
Start: 2024-01-02 | End: 2024-01-04

## 2024-01-02 RX ORDER — SODIUM CHLORIDE 9 MG/ML
500 INJECTION INTRAMUSCULAR; INTRAVENOUS; SUBCUTANEOUS
Refills: 0 | Status: COMPLETED | OUTPATIENT
Start: 2024-01-02 | End: 2024-01-02

## 2024-01-02 RX ADMIN — PANTOPRAZOLE SODIUM 40 MILLIGRAM(S): 20 TABLET, DELAYED RELEASE ORAL at 17:35

## 2024-01-02 RX ADMIN — ENOXAPARIN SODIUM 40 MILLIGRAM(S): 100 INJECTION SUBCUTANEOUS at 17:35

## 2024-01-02 RX ADMIN — Medication 0.12 MILLIGRAM(S): at 17:35

## 2024-01-02 RX ADMIN — ATORVASTATIN CALCIUM 10 MILLIGRAM(S): 80 TABLET, FILM COATED ORAL at 21:13

## 2024-01-02 RX ADMIN — Medication 1 GRAM(S): at 17:35

## 2024-01-02 RX ADMIN — Medication 1 GRAM(S): at 23:45

## 2024-01-02 RX ADMIN — NYSTATIN CREAM 1 APPLICATION(S): 100000 CREAM TOPICAL at 17:36

## 2024-01-02 RX ADMIN — Medication 0.12 MILLIGRAM(S): at 13:03

## 2024-01-02 RX ADMIN — PANTOPRAZOLE SODIUM 40 MILLIGRAM(S): 20 TABLET, DELAYED RELEASE ORAL at 06:47

## 2024-01-02 RX ADMIN — Medication 1 GRAM(S): at 06:47

## 2024-01-02 RX ADMIN — NYSTATIN CREAM 1 APPLICATION(S): 100000 CREAM TOPICAL at 06:47

## 2024-01-02 RX ADMIN — FAMOTIDINE 40 MILLIGRAM(S): 10 INJECTION INTRAVENOUS at 14:47

## 2024-01-02 RX ADMIN — Medication 0.12 MILLIGRAM(S): at 23:45

## 2024-01-02 RX ADMIN — SODIUM CHLORIDE 30 MILLILITER(S): 9 INJECTION INTRAMUSCULAR; INTRAVENOUS; SUBCUTANEOUS at 08:35

## 2024-01-02 RX ADMIN — LOSARTAN POTASSIUM 100 MILLIGRAM(S): 100 TABLET, FILM COATED ORAL at 06:47

## 2024-01-02 RX ADMIN — Medication 0.12 MILLIGRAM(S): at 06:47

## 2024-01-02 RX ADMIN — Medication 1 GRAM(S): at 13:03

## 2024-01-02 NOTE — DISCHARGE NOTE PROVIDER - NSDCCPCAREPLAN_GEN_ALL_CORE_FT
PRINCIPAL DISCHARGE DIAGNOSIS  Diagnosis: Pain on swallowing  Assessment and Plan of Treatment:

## 2024-01-02 NOTE — PROGRESS NOTE ADULT - ASSESSMENT
73F w/ hx of hiatal hernia repair in 2001 who developed hiatal hernia recurrence and now is s/p redo hiatal hernia repair with Toupet fundoplication on 11/30/23 with Dr. Galaviz. She has experienced worsening GERD symptoms since her last surgery, Upper GI study on 12/27 was unremarkable for mechanical etiology of her symptoms. Patient representing to the ED with persistent/worsening GERD symptoms despite anti-reflux medication optimization.    PLAN:  - Possible EGD today  - Regular diet  - GI consulted, appreciate recommendations  - Pepcid QD  - PPI BID  - sucralfate 1g oral suspension q6h  - DVT ppx    [facility]   [id number] 73F w/ hx of hiatal hernia repair in 2001 who developed hiatal hernia recurrence and now is s/p redo hiatal hernia repair with Toupet fundoplication on 11/30/23 with Dr. Galaviz. She has experienced worsening GERD symptoms since her last surgery, Upper GI study on 12/27 was unremarkable for mechanical etiology of her symptoms. Patient representing to the ED with persistent/worsening GERD symptoms despite anti-reflux medication optimization.    PLAN:  - Possible EGD today  - Regular diet  - GI consulted, appreciate recommendations  - Pepcid QD  - PPI BID  - sucralfate 1g oral suspension q6h  - DVT ppx    [facility]   [id number] 73F w/ hx of hiatal hernia repair in 2001 who developed hiatal hernia recurrence and now is s/p redo hiatal hernia repair with Toupet fundoplication on 11/30/23 with Dr. Galaviz. She has experienced worsening GERD symptoms since her last surgery, Upper GI study on 12/27 was unremarkable for mechanical etiology of her symptoms. Patient representing to the ED with persistent/worsening GERD symptoms despite anti-reflux medication optimization.    PLAN:  - Possible EGD today with GI  - NPO/IVF  - Pepcid QD, PPI BID, sucralfate 1g oral suspension q6h, levsin q6  - DVT ppx    [facility]   [id number] 73F w/ hx of hiatal hernia repair in 2001 who developed hiatal hernia recurrence and now is s/p redo hiatal hernia repair with Toupet fundoplication on 11/30/23 with Dr. Galaviz. She has experienced worsening GERD symptoms since her last surgery, Upper GI study on 12/27 was unremarkable for mechanical etiology of her symptoms. Patient representing to the ED with persistent/worsening GERD symptoms despite anti-reflux medication optimization.    PLAN:  - Possible EGD today with GI  - NPO/IVF  - Pepcid QD, PPI BID, sucralfate 1g oral suspension q6h, levsin q6  - DVT ppx    [facility]   [id number] 73F w/ hx of hiatal hernia repair in 2001 who developed hiatal hernia recurrence and now is s/p redo hiatal hernia repair with Toupet fundoplication on 11/30/23 with Dr. Galaviz. She has experienced worsening GERD symptoms since her last surgery, Upper GI study on 12/27 was unremarkable for mechanical etiology of her symptoms. Patient representing to the ED with persistent/worsening GERD symptoms despite anti-reflux medication optimization.    PLAN:  - EGD today with GI   - NPO/IVF  - Pepcid QD, PPI BID, sucralfate 1g oral suspension q6h, levsin q6  - DVT ppx    [facility]   [id number] 73F w/ hx of hiatal hernia repair in 2001 who developed hiatal hernia recurrence and now is s/p redo hiatal hernia repair with Toupet fundoplication on 11/30/23 with Dr. Galaviz. She has experienced worsening GERD symptoms since her last surgery, Upper GI study on 12/27 was unremarkable for mechanical etiology of her symptoms. Patient representing to the ED with persistent/worsening GERD symptoms despite anti-reflux medication optimization.    PLAN:  - EGD today with GI   - NPO/IVF  - Pepcid QD, PPI BID, sucralfate 1g oral suspension q6h, levsin q6  - DVT ppx    [facility]   [id number]

## 2024-01-02 NOTE — PROGRESS NOTE ADULT - SUBJECTIVE AND OBJECTIVE BOX
Surgery Progress Note     Overnight: None    Subjective:  Patient seen and examined bedside during morning rounds. Having BMs, passing flatus. Denies any fever, chills, vomiting, diarrhea, SOB, or chest pain.     Vital Signs:  Vital Signs Last 24 Hrs  T(C): 36.8 (01 Jan 2024 21:14), Max: 37.2 (01 Jan 2024 16:35)  T(F): 98.2 (01 Jan 2024 21:14), Max: 99 (01 Jan 2024 16:35)  HR: 80 (01 Jan 2024 21:14) (79 - 85)  BP: 136/82 (01 Jan 2024 21:14) (113/66 - 136/82)  BP(mean): --  RR: 18 (01 Jan 2024 21:14) (18 - 18)  SpO2: 97% (01 Jan 2024 21:14) (93% - 98%)    Parameters below as of 01 Jan 2024 21:14  Patient On (Oxygen Delivery Method): room air    Physical Exam:  General: NAD, Sitting in bed comfortably, coughing  Resp: Breathing comfortably on room air  GI/Abd: Soft, nontender, nondistended, laparoscopic incisions well-healed      Labs:                          10.1   5.22  )-----------( 227      ( 01 Jan 2024 07:00 )             30.6   01-01    140  |  105  |  16  ----------------------------<  134<H>  3.5   |  22  |  0.78    Ca    8.3<L>      01 Jan 2024 07:00  Phos  2.0     01-01  Mg     2.0     01-01          I&O's Summary  I&O's Detail    31 Dec 2023 07:01  -  01 Jan 2024 07:00  --------------------------------------------------------  IN:    IV PiggyBack: 300 mL    IV PiggyBack: 500 mL    IV PiggyBack: 50 mL    Lactated Ringers: 2400 mL    Oral Fluid: 400 mL  Total IN: 3650 mL    OUT:    Voided (mL): 800 mL  Total OUT: 800 mL    Total NET: 2850 mL      01 Jan 2024 07:01  -  02 Jan 2024 00:47  --------------------------------------------------------  IN:    Oral Fluid: 720 mL  Total IN: 720 mL    OUT:    Voided (mL): 250 mL  Total OUT: 250 mL    Total NET: 470 mL               Surgery Progress Note     Overnight: None    Subjective:  Patient seen and examined bedside during morning rounds.    Vital Signs:  Vital Signs Last 24 Hrs  T(C): 36.8 (01 Jan 2024 21:14), Max: 37.2 (01 Jan 2024 16:35)  T(F): 98.2 (01 Jan 2024 21:14), Max: 99 (01 Jan 2024 16:35)  HR: 80 (01 Jan 2024 21:14) (79 - 85)  BP: 136/82 (01 Jan 2024 21:14) (113/66 - 136/82)  BP(mean): --  RR: 18 (01 Jan 2024 21:14) (18 - 18)  SpO2: 97% (01 Jan 2024 21:14) (93% - 98%)    Parameters below as of 01 Jan 2024 21:14  Patient On (Oxygen Delivery Method): room air    Physical Exam:  General: NAD, Sitting in bed comfortably, coughing  Resp: Breathing comfortably on room air  GI/Abd: Soft, nontender, nondistended, laparoscopic incisions well-healed      Labs:                          10.1   5.22  )-----------( 227      ( 01 Jan 2024 07:00 )             30.6   01-01    140  |  105  |  16  ----------------------------<  134<H>  3.5   |  22  |  0.78    Ca    8.3<L>      01 Jan 2024 07:00  Phos  2.0     01-01  Mg     2.0     01-01          I&O's Summary  I&O's Detail    31 Dec 2023 07:01  -  01 Jan 2024 07:00  --------------------------------------------------------  IN:    IV PiggyBack: 300 mL    IV PiggyBack: 500 mL    IV PiggyBack: 50 mL    Lactated Ringers: 2400 mL    Oral Fluid: 400 mL  Total IN: 3650 mL    OUT:    Voided (mL): 800 mL  Total OUT: 800 mL    Total NET: 2850 mL      01 Jan 2024 07:01  -  02 Jan 2024 00:47  --------------------------------------------------------  IN:    Oral Fluid: 720 mL  Total IN: 720 mL    OUT:    Voided (mL): 250 mL  Total OUT: 250 mL    Total NET: 470 mL               Surgery Progress Note     Overnight: None    Subjective:  Patient seen and examined bedside during morning rounds. Slept better. Pain has improved a little bit but once she has liquids it returns and the pain and burning is in the throat and upper chest. Hasn't had anything since before midnight for scope today. Pain started about 1 week ago.     Vital Signs:  Vital Signs Last 24 Hrs  T(C): 36.8 (01 Jan 2024 21:14), Max: 37.2 (01 Jan 2024 16:35)  T(F): 98.2 (01 Jan 2024 21:14), Max: 99 (01 Jan 2024 16:35)  HR: 80 (01 Jan 2024 21:14) (79 - 85)  BP: 136/82 (01 Jan 2024 21:14) (113/66 - 136/82)  BP(mean): --  RR: 18 (01 Jan 2024 21:14) (18 - 18)  SpO2: 97% (01 Jan 2024 21:14) (93% - 98%)    Parameters below as of 01 Jan 2024 21:14  Patient On (Oxygen Delivery Method): room air    Physical Exam:  General: NAD, Sitting in bed comfortably, coughing  Resp: Breathing comfortably on room air  GI/Abd: Soft, nontender, nondistended, laparoscopic incisions well-healed      Labs:                          10.1   5.22  )-----------( 227      ( 01 Jan 2024 07:00 )             30.6   01-01    140  |  105  |  16  ----------------------------<  134<H>  3.5   |  22  |  0.78    Ca    8.3<L>      01 Jan 2024 07:00  Phos  2.0     01-01  Mg     2.0     01-01          I&O's Summary  I&O's Detail    31 Dec 2023 07:01  -  01 Jan 2024 07:00  --------------------------------------------------------  IN:    IV PiggyBack: 300 mL    IV PiggyBack: 500 mL    IV PiggyBack: 50 mL    Lactated Ringers: 2400 mL    Oral Fluid: 400 mL  Total IN: 3650 mL    OUT:    Voided (mL): 800 mL  Total OUT: 800 mL    Total NET: 2850 mL      01 Jan 2024 07:01  -  02 Jan 2024 00:47  --------------------------------------------------------  IN:    Oral Fluid: 720 mL  Total IN: 720 mL    OUT:    Voided (mL): 250 mL  Total OUT: 250 mL    Total NET: 470 mL

## 2024-01-02 NOTE — DISCHARGE NOTE PROVIDER - NSDCHOSPICE_GEN_A_CORE
Patient has pain in left knee, knee cap and pain radiated behind the knee and sides. No appointments available per request , please call mom. No

## 2024-01-02 NOTE — DISCHARGE NOTE PROVIDER - NSDCCPTREATMENT_GEN_ALL_CORE_FT
PRINCIPAL PROCEDURE  Procedure: Upper G.I. endoscopy  Findings and Treatment: - The procedure was aborted due to presence of large amount of food.  - Multiple non-bleeding esophageal ulcers  - Widely open GE junction.  - A large amount of food (residue) in the stomach.      SECONDARY PROCEDURE  Procedure: Gastric motility study  Findings and Treatment: - Delayed emptying with liquids and solids

## 2024-01-02 NOTE — DISCHARGE NOTE PROVIDER - HOSPITAL COURSE
HPI:  73F w/ PMHx of HTN, HLD, GERD, Hiatal hernia s/p repair w/ Toupet fundoplication (2001)c/b hiatal hernia recurrence, now s/p recent laparoscopic redo hiatal hernia repair with redo Toupet fundoplication on 11/30/23 by Dr. Galaviz, presenting now with ~2week history of new onset severe post prandial chest burning sensation/pain and strong acid taste in her moth that she states was never present prior to surgery. This past week, patient's symptoms progressively worsened thus prompting her to contact Dr. Galaviz's office who then obtained an upper GI study to assess the status of the hernia repair. Results form this study were unremarkable with no suspicion of hernia recurrence or a tight fundoplication, thus patient was advised to take GERD medication. Yesterday, symptoms persisted and she presented to the ED for evaluation where GI and Bariatric surgery evaluated her and opted for optimization of her anti-reflux medication (PPI, Pepcid, and Carafate suspension). Today, patient represents to the ED complaining of persistent/worsening GERD symptoms despite these medication adjustments. Patient states she is able to drink and eat solid food, however does not take much due to GERD symptoms. No nausea or vomiting. Passing gas and having BMs.    In the ED, patient was afebrile, hemodynamically stable, labs largely unremarkable, CTAP w/ IV and PO contrast revealed no intraabdominal pathology or concerns for mechanical complications related to her recent hiatal hernia repair.   (30 Dec 2023 22:48)   HPI:  73F w/ PMHx of HTN, HLD, GERD, Hiatal hernia s/p repair w/ Toupet fundoplication (2001)c/b hiatal hernia recurrence, now s/p recent laparoscopic redo hiatal hernia repair with redo Toupet fundoplication on 11/30/23 by Dr. Galaviz, presenting now with ~2week history of new onset severe post prandial chest burning sensation/pain and strong acid taste in her moth that she states was never present prior to surgery. This past week, patient's symptoms progressively worsened thus prompting her to contact Dr. Galaviz's office who then obtained an upper GI study to assess the status of the hernia repair. Results form this study were unremarkable with no suspicion of hernia recurrence or a tight fundoplication, thus patient was advised to take GERD medication. Yesterday, symptoms persisted and she presented to the ED for evaluation where GI and Bariatric surgery evaluated her and opted for optimization of her anti-reflux medication (PPI, Pepcid, and Carafate suspension). Today, patient represents to the ED complaining of persistent/worsening GERD symptoms despite these medication adjustments. Patient states she is able to drink and eat solid food, however does not take much due to GERD symptoms. No nausea or vomiting. Passing gas and having BMs. In the ED, patient was afebrile, hemodynamically stable, labs largely unremarkable, CTAP w/ IV and PO contrast revealed no intraabdominal pathology or concerns for mechanical complications related to her recent hiatal hernia repair. Previous UGI completed outpatient showed no mechanical issue that could be causing her symptoms. Patient was placed on PPI, pepcid, and carafate, which appeared to somewhat clinically improve her symptoms. Patient was taken for a EGD with GI on 1/2, which was aborted due to large amount of food in stomach. It also showed a wide patent GE junction and multiple non-bleeding esophageal ulcers. Patient was also taken for a gastric emptying study, which showed delayed emptying with both liquid and solid food. GI was re-consulted for possible pyloric botox injection for gastroparesis, however, they deemed that since the patient was able to tolerate food and because the patient did not procedure currently, that patient could follow up outpatient for the botox injection. Patient will also undergo a repeat EGD with [facility] in 8 weeks with the motility team. Patient was deemed stable for discharge, afebrile, hemodynamically stable and was discharged home with PPI, pepcid, carafate, and reglan.

## 2024-01-02 NOTE — DISCHARGE NOTE PROVIDER - CARE PROVIDERS DIRECT ADDRESSES
[email or web address]

## 2024-01-02 NOTE — DISCHARGE NOTE PROVIDER - NSDCFUADDINST_GEN_ALL_CORE_FT
MEDICATIONS: You have been prescribed Pepcid, Protonix, Carafate, and Reglan for your symptoms. Please pick these medications up from your pharmacy and take them as prescribed. Please continue home medications as prescribed. If you have any questions, please call your surgeon's office.     FOLLOW UP:  1. Please follow up with your primary care physician in one week regarding your hospitalization, bring copies of your discharge paperwork.  2. Please follow up with Dr. Gonzalez, the gastroenterologist in 2 weeks. Please call the number listed to make and appointment.  3. Please follow up with Dr. Galaviz, your surgeon in 2 weeks. Please call the number listed to make an appointment. MEDICATIONS: You have been prescribed Pepcid, Protonix, Carafate, and Reglan for your symptoms. Please pick these medications up from your pharmacy and take them as prescribed. Please continue home medications as prescribed. If you have any questions, please call your surgeon's office.     FOLLOW UP:  1. Please follow up with your primary care physician in one week regarding your hospitalization, bring copies of your discharge paperwork.  2. Please follow up with Dr. Gonzalez, the gastroenterologist in 1 week. Please call the number listed to make and appointment.  3. Please follow up with Dr. Galaviz, your surgeon in 1 week. Please call the number listed to make an appointment. MEDICATIONS: You have been prescribed Pepcid, Protonix, Carafate, and Reglan for your symptoms. Please pick these medications up from your pharmacy and take them as prescribed. Please continue home medications as prescribed. If you have any questions, please call your surgeon's office.     FOLLOW UP:  1. Please follow up with your primary care physician in one week regarding your hospitalization, bring copies of your discharge paperwork.  2. Please follow up with Dr. Gonzalez, the gastroenterologist. You have an appointment scheduled for 1/22.  3. Please follow up with Dr. Galaviz, your surgeon in 1 week. Please call the number listed to make an appointment.

## 2024-01-02 NOTE — DISCHARGE NOTE PROVIDER - NSDCFUSCHEDAPPT_GEN_ALL_CORE_FT
Angie Koch  [facility] Physician [facility]  DERM [address]  Scheduled Appointment: 01/16/2024    Rosario Gonzalez  [facility]  GASTRO [address]  Scheduled Appointment: 01/22/2024    Gisela Alvarez  [facility]  [address] [facility] [address] [place] [address]  Scheduled Appointment: 01/29/2024    [facility]  ENDOCRIN [address]  Scheduled Appointment: 02/28/2024    Artur Jacobo  [facility]  ENDOCRIN [address]  Scheduled Appointment: 03/04/2024     Angie Koch  [facility] Physician [facility]  DERM [address]  Scheduled Appointment: 01/16/2024    Rosario Gonzalez  [facility]  GASTRO [address]  Scheduled Appointment: 01/22/2024    Gisela Alvarez  [facility]  [address]Y [address] [facility]  Scheduled Appointment: 01/29/2024    [facility]  ENDOCRIN [address]  Scheduled Appointment: 02/28/2024    Artur Jacobo  [facility]  ENDOCRIN [address]  Scheduled Appointment: 03/04/2024

## 2024-01-02 NOTE — DISCHARGE NOTE PROVIDER - PROVIDER TOKENS
PROVIDER:[TOKEN:[[id number]],FOLLOWUP:[2 weeks]],PROVIDER:[TOKEN:[[id number]],FOLLOWUP:[2 weeks]] PROVIDER:[TOKEN:[[id number]],FOLLOWUP:[1 week]],PROVIDER:[TOKEN:[[id number]],FOLLOWUP:[1 week]] PROVIDER:[TOKEN:[[id number]],FOLLOWUP:[1 week]],PROVIDER:[TOKEN:[[id number]]]

## 2024-01-02 NOTE — PRE PROCEDURE NOTE - PRE PROCEDURE EVALUATION
Attending Physician:  Dr Davial                           Procedure: EGD    Indication for Procedure: dysphagia   ________________________________________________________  PAST MEDICAL & SURGICAL HISTORY:  H/O seasonal allergies      History of pulmonary embolism  developed after billings abigail surgery,1984 treated with coumadin 3 months, no issues after      Restless leg syndrome      GERD (gastroesophageal reflux disease)      OA (osteoarthritis)      Fungal infection of skin  under breast      H/O scoliosis      Essential hypertension      Depression      Osteoporosis      Right hip pain      2019 novel coronavirus disease (COVID-19)  Dec 2020- hospitalized for 3 days, did not require home O2, denies residual symptoms      Hiatal hernia      History of chronic pain      Peripheral neuropathy      S/P repair of paraesophageal hernia  2001      S/P spinal fusion  L4-L5 1966      Scoliosis  s/p placement of billings abigail x 2 1984      S/P spinal surgery  x 2 1985, 1986      Removal of pin, plate, abigail, or screw  1991- removal of billings abigail      S/P hysterectomy  1982      S/P hip replacement  left (2008)      S/P shoulder surgery  right (2012)      S/P dilatation and curettage  1981      H/O arthroscopy of knee  June 2021      S/P cataract surgery      History of bilateral hip replacements        ALLERGIES:  erythromycin (Stomach Upset; Vomiting; Nausea)  Dilantin (Urticaria)  penicillins (Urticaria)    HOME MEDICATIONS:  atorvastatin 10 mg oral tablet: 1 tab(s) orally once a day  chlorthalidone 25 mg oral tablet: 1 tab(s) orally once a day  losartan 100 mg oral tablet: 1 tab(s) orally once a day  pramipexole 3.75 mg oral tablet, extended release: 1 tab(s) orally once a day    AICD/PPM: [ ] yes   [x] no    PERTINENT LAB DATA:                        10.8   4.33  )-----------( 231      ( 02 Jan 2024 05:03 )             33.6     01-02    142  |  108  |  12  ----------------------------<  112<H>  4.1   |  26  |  0.87    Ca    8.7      02 Jan 2024 05:03  Phos  2.5     01-02  Mg     1.8     01-02      PT/INR - ( 02 Jan 2024 05:03 )   PT: 10.8 sec;   INR: 0.98 ratio         PTT - ( 02 Jan 2024 05:03 )  PTT:30.1 sec            PHYSICAL EXAMINATION:    T(C): 36.8  HR: 79  BP: 133/78  RR: 18  SpO2: 94%    Constitutional: NAD    Neck:  No JVD  Respiratory: CTAB/L  Cardiovascular: S1 and S2  Gastrointestinal: BS+, soft, NT/ND  Extremities: No peripheral edema  Neurological: A/O x 4, no focal deficits        COMMENTS:    The patient is a suitable candidate for the planned procedure unless box checked [ ]  No, explain:     Attending Physician:  Dr Davila                           Procedure: EGD    Indication for Procedure: dysphagia   ________________________________________________________  PAST MEDICAL & SURGICAL HISTORY:  H/O seasonal allergies      History of pulmonary embolism  developed after billings abigail surgery,1984 treated with coumadin 3 months, no issues after      Restless leg syndrome      GERD (gastroesophageal reflux disease)      OA (osteoarthritis)      Fungal infection of skin  under breast      H/O scoliosis      Essential hypertension      Depression      Osteoporosis      Right hip pain      2019 novel coronavirus disease (COVID-19)  Dec 2020- hospitalized for 3 days, did not require home O2, denies residual symptoms      Hiatal hernia      History of chronic pain      Peripheral neuropathy      S/P repair of paraesophageal hernia  2001      S/P spinal fusion  L4-L5 1966      Scoliosis  s/p placement of billings abigail x 2 1984      S/P spinal surgery  x 2 1985, 1986      Removal of pin, plate, abigail, or screw  1991- removal of billnigs abigail      S/P hysterectomy  1982      S/P hip replacement  left (2008)      S/P shoulder surgery  right (2012)      S/P dilatation and curettage  1981      H/O arthroscopy of knee  June 2021      S/P cataract surgery      History of bilateral hip replacements        ALLERGIES:  erythromycin (Stomach Upset; Vomiting; Nausea)  Dilantin (Urticaria)  penicillins (Urticaria)    HOME MEDICATIONS:  atorvastatin 10 mg oral tablet: 1 tab(s) orally once a day  chlorthalidone 25 mg oral tablet: 1 tab(s) orally once a day  losartan 100 mg oral tablet: 1 tab(s) orally once a day  pramipexole 3.75 mg oral tablet, extended release: 1 tab(s) orally once a day    AICD/PPM: [ ] yes   [x] no    PERTINENT LAB DATA:                        10.8   4.33  )-----------( 231      ( 02 Jan 2024 05:03 )             33.6     01-02    142  |  108  |  12  ----------------------------<  112<H>  4.1   |  26  |  0.87    Ca    8.7      02 Jan 2024 05:03  Phos  2.5     01-02  Mg     1.8     01-02      PT/INR - ( 02 Jan 2024 05:03 )   PT: 10.8 sec;   INR: 0.98 ratio         PTT - ( 02 Jan 2024 05:03 )  PTT:30.1 sec            PHYSICAL EXAMINATION:    T(C): 36.8  HR: 79  BP: 133/78  RR: 18  SpO2: 94%    Constitutional: NAD    Neck:  No JVD  Respiratory: CTAB/L  Cardiovascular: S1 and S2  Gastrointestinal: BS+, soft, NT/ND  Extremities: No peripheral edema  Neurological: A/O x 4, no focal deficits        COMMENTS:    The patient is a suitable candidate for the planned procedure unless box checked [ ]  No, explain:

## 2024-01-02 NOTE — PRE-OP CHECKLIST - NS PREOP CHK MONITOR ANESTHESIA CONSENT
Regarding menstrual cycles, recommend keeping menstrual diary for the next 3 months  Nipple discharge 15 months after breast feeding is not abnormal, follow up if persists, defer workup  Use alprazolam sparingly   Trial nystatin/triamcinolone for recurrent vaginal/rectal irritation    done

## 2024-01-02 NOTE — PROGRESS NOTE ADULT - ATTENDING COMMENTS
Patient seen and examined this morning after endoscopy.  She is feeling okay with some improvement of symptoms, although still states that liquids are worse for her than solids.  She continues on twice daily PPI, H2 blocker, and 4 times daily Carafate.  EGD was performed and showed esophageal ulcers, and a large amount of food in the stomach, although patient had been n.p.o. since approximately 1130p the following evening.  Her abdomen is soft, nontender nondistended.  Her incisions are well-healed.  Labs and vitals reviewed.    - no need for further EGD at this time, will likely purse gastric emptying study as this could be contributing to her continued reflux.  - cont PPI BID, H2 blocker, QID carafate  - no acute surgical intervention at this time  - ok for reg diet    Shantelle Bravo MD

## 2024-01-02 NOTE — DISCHARGE NOTE PROVIDER - NSDCMRMEDTOKEN_GEN_ALL_CORE_FT
atorvastatin 10 mg oral tablet: 1 tab(s) orally once a day  chlorthalidone 25 mg oral tablet: 1 tab(s) orally once a day  losartan 100 mg oral tablet: 1 tab(s) orally once a day  pramipexole 3.75 mg oral tablet, extended release: 1 tab(s) orally once a day   atorvastatin 10 mg oral tablet: 1 tab(s) orally once a day  chlorthalidone 25 mg oral tablet: 1 tab(s) orally once a day  Cozaar 100 mg oral tablet: 1 tab(s) orally once a day  famotidine 40 mg/5 mL oral suspension: 5 milliliter(s) orally once a day MDD: 1  Lipitor 10 mg oral tablet: 1 tab(s) orally once a day (at bedtime)  losartan 100 mg oral tablet: 1 tab(s) orally once a day  metoclopramide 5 mg/5 mL oral syrup: 5 milliliter(s) orally every 6 hours MDD: 4  pantoprazole 40 mg oral granule, delayed release: 1 each orally 2 times a day MDD: 2  pramipexole 3.75 mg oral tablet, extended release: 1 tab(s) orally once a day  sucralfate 1 g/10 mL oral suspension: 10 milliliter(s) orally every 6 hours   chlorthalidone 25 mg oral tablet: 1 tab(s) orally once a day  Cozaar 100 mg oral tablet: 1 tab(s) orally once a day  famotidine 40 mg/5 mL oral suspension: 5 milliliter(s) orally once a day MDD: 1  Lipitor 10 mg oral tablet: 1 tab(s) orally once a day (at bedtime)  metoclopramide 5 mg/5 mL oral syrup: 5 milliliter(s) orally every 6 hours MDD: 4  pantoprazole 40 mg oral granule, delayed release: 1 each orally 2 times a day MDD: 2  pramipexole 3.75 mg oral tablet, extended release: 1 tab(s) orally once a day  sucralfate 1 g/10 mL oral suspension: 10 milliliter(s) orally every 6 hours

## 2024-01-02 NOTE — PRE-ANESTHESIA EVALUATION ADULT - NSANTHPMHFT_GEN_ALL_CORE
73F with HTN, GERD s/p redo hiatal hernia repair approx 1 month ago with recurrent reflux symptoms s/p multiple presentations to the ED and adjustment of medical therapy. No evidence of obstruction on imaging but CT findings suggestive of esophagitis.

## 2024-01-02 NOTE — DISCHARGE NOTE PROVIDER - CARE PROVIDER_API CALL
Ethan Galaviz  Surgery  [address], [address]  [place] [address]  Phone: [phone]  Fax: [phone]  Follow Up Time: 2 weeks    Rosario Gonzalez  Gastroenterology  [address] [place] [address], [facility] [address]  [place] [address]  Phone: [phone]  Fax: [phone]  Follow Up Time: 2 weeks   Ethan Galaviz  Surgery  [address] [facility], [address]  [place] [address]  Phone: [phone]  Fax: [phone]  Follow Up Time: 2 weeks    Rosario Gonzalez  Gastroenterology  [address] [place] [address], [facility] [address]  [place] [address]  Phone: [phone]  Fax: [phone]  Follow Up Time: 2 weeks   Ethan Galaviz  Surgery  [address] [facility], [address]  [place] [address]  Phone: [phone]  Fax: [phone]  Follow Up Time: 1 week    Rosario Gonzalez  Gastroenterology  [address] [place] [address], [facility] [address]  [place] [address]  Phone: [phone]  Fax: [phone]  Follow Up Time: 1 week   Ethan Galaviz  Surgery  [address] [facility], [address]  [place] [address]  Phone: [phone]  Fax: [phone]  Follow Up Time: 1 week    Rosario Gonzalez  Gastroenterology  [address] [place] [address], [facility] [address]  [place] [address]  Phone: [phone]  Fax: [phone]  Follow Up Time: 1 week   Ethan Galaviz  Surgery  [address] [facility], [address]  [place] [address]  Phone: [phone]  Fax: [phone]  Follow Up Time: 1 week    Rosario Gonzalez  Gastroenterology  [address] [place] [address], [facility] [[email or web address] [address]  [place] [address]  Phone: [phone]  Fax: [phone]  Follow Up Time:    Ethan Galaviz  Surgery  [address] [facility], [address]  [place] [address]  Phone: [phone]  Fax: [phone]  Follow Up Time: 1 week    Rosario Gonzalez  Gastroenterology  [address] [place] [address], [facility] [address]  [place] [address]  Phone: [phone]  Fax: [phone]  Follow Up Time:

## 2024-01-03 LAB
ANION GAP SERPL CALC-SCNC: 10 MMOL/L — SIGNIFICANT CHANGE UP (ref 5–17)
ANION GAP SERPL CALC-SCNC: 10 MMOL/L — SIGNIFICANT CHANGE UP (ref 5–17)
BUN SERPL-MCNC: 14 MG/DL — SIGNIFICANT CHANGE UP (ref 7–23)
BUN SERPL-MCNC: 14 MG/DL — SIGNIFICANT CHANGE UP (ref 7–23)
CALCIUM SERPL-MCNC: 8.8 MG/DL — SIGNIFICANT CHANGE UP (ref 8.4–10.5)
CALCIUM SERPL-MCNC: 8.8 MG/DL — SIGNIFICANT CHANGE UP (ref 8.4–10.5)
CHLORIDE SERPL-SCNC: 105 MMOL/L — SIGNIFICANT CHANGE UP (ref 96–108)
CHLORIDE SERPL-SCNC: 105 MMOL/L — SIGNIFICANT CHANGE UP (ref 96–108)
CO2 SERPL-SCNC: 25 MMOL/L — SIGNIFICANT CHANGE UP (ref 22–31)
CO2 SERPL-SCNC: 25 MMOL/L — SIGNIFICANT CHANGE UP (ref 22–31)
CREAT SERPL-MCNC: 0.93 MG/DL — SIGNIFICANT CHANGE UP (ref 0.5–1.3)
CREAT SERPL-MCNC: 0.93 MG/DL — SIGNIFICANT CHANGE UP (ref 0.5–1.3)
EGFR: 64 ML/MIN/1.73M2 — SIGNIFICANT CHANGE UP
EGFR: 64 ML/MIN/1.73M2 — SIGNIFICANT CHANGE UP
GLUCOSE BLDC GLUCOMTR-MCNC: 100 MG/DL — HIGH (ref 70–99)
GLUCOSE BLDC GLUCOMTR-MCNC: 100 MG/DL — HIGH (ref 70–99)
GLUCOSE SERPL-MCNC: 105 MG/DL — HIGH (ref 70–99)
GLUCOSE SERPL-MCNC: 105 MG/DL — HIGH (ref 70–99)
HCT VFR BLD CALC: 36.2 % — SIGNIFICANT CHANGE UP (ref 34.5–45)
HCT VFR BLD CALC: 36.2 % — SIGNIFICANT CHANGE UP (ref 34.5–45)
HGB BLD-MCNC: 11.3 G/DL — LOW (ref 11.5–15.5)
HGB BLD-MCNC: 11.3 G/DL — LOW (ref 11.5–15.5)
MAGNESIUM SERPL-MCNC: 1.8 MG/DL — SIGNIFICANT CHANGE UP (ref 1.6–2.6)
MAGNESIUM SERPL-MCNC: 1.8 MG/DL — SIGNIFICANT CHANGE UP (ref 1.6–2.6)
MCHC RBC-ENTMCNC: 28.4 PG — SIGNIFICANT CHANGE UP (ref 27–34)
MCHC RBC-ENTMCNC: 28.4 PG — SIGNIFICANT CHANGE UP (ref 27–34)
MCHC RBC-ENTMCNC: 31.2 GM/DL — LOW (ref 32–36)
MCHC RBC-ENTMCNC: 31.2 GM/DL — LOW (ref 32–36)
MCV RBC AUTO: 91 FL — SIGNIFICANT CHANGE UP (ref 80–100)
MCV RBC AUTO: 91 FL — SIGNIFICANT CHANGE UP (ref 80–100)
NRBC # BLD: 0 /100 WBCS — SIGNIFICANT CHANGE UP (ref 0–0)
NRBC # BLD: 0 /100 WBCS — SIGNIFICANT CHANGE UP (ref 0–0)
PHOSPHATE SERPL-MCNC: 1.8 MG/DL — LOW (ref 2.5–4.5)
PHOSPHATE SERPL-MCNC: 1.8 MG/DL — LOW (ref 2.5–4.5)
PLATELET # BLD AUTO: 241 K/UL — SIGNIFICANT CHANGE UP (ref 150–400)
PLATELET # BLD AUTO: 241 K/UL — SIGNIFICANT CHANGE UP (ref 150–400)
POTASSIUM SERPL-MCNC: 4.4 MMOL/L — SIGNIFICANT CHANGE UP (ref 3.5–5.3)
POTASSIUM SERPL-MCNC: 4.4 MMOL/L — SIGNIFICANT CHANGE UP (ref 3.5–5.3)
POTASSIUM SERPL-SCNC: 4.4 MMOL/L — SIGNIFICANT CHANGE UP (ref 3.5–5.3)
POTASSIUM SERPL-SCNC: 4.4 MMOL/L — SIGNIFICANT CHANGE UP (ref 3.5–5.3)
RBC # BLD: 3.98 M/UL — SIGNIFICANT CHANGE UP (ref 3.8–5.2)
RBC # BLD: 3.98 M/UL — SIGNIFICANT CHANGE UP (ref 3.8–5.2)
RBC # FLD: 15.1 % — HIGH (ref 10.3–14.5)
RBC # FLD: 15.1 % — HIGH (ref 10.3–14.5)
SODIUM SERPL-SCNC: 140 MMOL/L — SIGNIFICANT CHANGE UP (ref 135–145)
SODIUM SERPL-SCNC: 140 MMOL/L — SIGNIFICANT CHANGE UP (ref 135–145)
WBC # BLD: 4.78 K/UL — SIGNIFICANT CHANGE UP (ref 3.8–10.5)
WBC # BLD: 4.78 K/UL — SIGNIFICANT CHANGE UP (ref 3.8–10.5)
WBC # FLD AUTO: 4.78 K/UL — SIGNIFICANT CHANGE UP (ref 3.8–10.5)
WBC # FLD AUTO: 4.78 K/UL — SIGNIFICANT CHANGE UP (ref 3.8–10.5)

## 2024-01-03 PROCEDURE — 78264 GASTRIC EMPTYING IMG STUDY: CPT | Mod: 26

## 2024-01-03 RX ORDER — METOCLOPRAMIDE HCL 10 MG
5 TABLET ORAL EVERY 6 HOURS
Refills: 0 | Status: DISCONTINUED | OUTPATIENT
Start: 2024-01-03 | End: 2024-01-03

## 2024-01-03 RX ORDER — SODIUM,POTASSIUM PHOSPHATES 278-250MG
2 POWDER IN PACKET (EA) ORAL ONCE
Refills: 0 | Status: COMPLETED | OUTPATIENT
Start: 2024-01-03 | End: 2024-01-03

## 2024-01-03 RX ADMIN — PANTOPRAZOLE SODIUM 40 MILLIGRAM(S): 20 TABLET, DELAYED RELEASE ORAL at 06:10

## 2024-01-03 RX ADMIN — LOSARTAN POTASSIUM 100 MILLIGRAM(S): 100 TABLET, FILM COATED ORAL at 06:10

## 2024-01-03 RX ADMIN — FAMOTIDINE 40 MILLIGRAM(S): 10 INJECTION INTRAVENOUS at 14:59

## 2024-01-03 RX ADMIN — Medication 2 PACKET(S): at 15:00

## 2024-01-03 RX ADMIN — Medication 1 GRAM(S): at 18:28

## 2024-01-03 RX ADMIN — Medication 0.12 MILLIGRAM(S): at 18:28

## 2024-01-03 RX ADMIN — NYSTATIN CREAM 1 APPLICATION(S): 100000 CREAM TOPICAL at 06:10

## 2024-01-03 RX ADMIN — PANTOPRAZOLE SODIUM 40 MILLIGRAM(S): 20 TABLET, DELAYED RELEASE ORAL at 18:27

## 2024-01-03 RX ADMIN — ATORVASTATIN CALCIUM 10 MILLIGRAM(S): 80 TABLET, FILM COATED ORAL at 22:33

## 2024-01-03 RX ADMIN — Medication 0.12 MILLIGRAM(S): at 06:09

## 2024-01-03 RX ADMIN — Medication 1 GRAM(S): at 14:37

## 2024-01-03 RX ADMIN — Medication 1 GRAM(S): at 06:09

## 2024-01-03 RX ADMIN — DEXTROSE MONOHYDRATE, SODIUM CHLORIDE, AND POTASSIUM CHLORIDE 75 MILLILITER(S): 50; .745; 4.5 INJECTION, SOLUTION INTRAVENOUS at 08:36

## 2024-01-03 RX ADMIN — ENOXAPARIN SODIUM 40 MILLIGRAM(S): 100 INJECTION SUBCUTANEOUS at 18:28

## 2024-01-03 RX ADMIN — NYSTATIN CREAM 1 APPLICATION(S): 100000 CREAM TOPICAL at 18:57

## 2024-01-03 RX ADMIN — Medication 0.12 MILLIGRAM(S): at 14:37

## 2024-01-03 RX ADMIN — DEXTROSE MONOHYDRATE, SODIUM CHLORIDE, AND POTASSIUM CHLORIDE 75 MILLILITER(S): 50; .745; 4.5 INJECTION, SOLUTION INTRAVENOUS at 06:09

## 2024-01-03 NOTE — PROGRESS NOTE ADULT - ATTENDING COMMENTS
73F, s/p HH repair in Nov 2023, admitted with heartburn, odynophagia, abdominal pain  s/p EGD yesterday showing multiple large esophageal ulcers. Procedure aborted due to large amount of retained food.   Please see report for full details.     Recommend PPI BID, carafate   Plan for repeat EGD w Bravo in ~ 8wks with motility team   F/u Dr Peres on d/c     GI to sign off, please call w questions 73F, s/p HH repair in Nov 2023, admitted with heartburn, odynophagia, abdominal pain  s/p EGD yesterday showing multiple large esophageal ulcers. Procedure aborted due to large amount of retained food.   Please see report for full details.     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

## 2024-01-03 NOTE — PROGRESS NOTE ADULT - ASSESSMENT
73F w/ hx of hiatal hernia repair in 2001 who developed hiatal hernia recurrence and now is s/p redo hiatal hernia repair with Toupet fundoplication on 11/30/23 with Dr. Galaviz. She has experienced worsening GERD symptoms since her last surgery, Upper GI study on 12/27 was unremarkable for mechanical etiology of her symptoms. Patient representing to the ED with persistent/worsening GERD symptoms despite anti-reflux medication optimization.    PLAN:  - possible repeat scope today with GI  - NPO/IVF  - Pepcid QD, PPI BID, sucralfate 1g oral suspension q6h, levsin q6  - DVT ppx    [place] [facility]   [id number] 73F w/ hx of hiatal hernia repair in 2001 who developed hiatal hernia recurrence and now is s/p redo hiatal hernia repair with Toupet fundoplication on 11/30/23 with Dr. Galaviz. She has experienced worsening GERD symptoms since her last surgery, Upper GI study on 12/27 was unremarkable for mechanical etiology of her symptoms. Patient representing to the ED with persistent/worsening GERD symptoms despite anti-reflux medication optimization.    PLAN:  - possible repeat scope today with GI  - NPO/IVF  - Pepcid QD, PPI BID, sucralfate 1g oral suspension q6h, levsin q6  - DVT ppx    [place] [facility]   [id number] 73F w/ hx of hiatal hernia repair in 2001 who developed hiatal hernia recurrence and now is s/p redo hiatal hernia repair with Toupet fundoplication on 11/30/23 with Dr. Galaviz. She has experienced worsening GERD symptoms since her last surgery, Upper GI study on 12/27 was unremarkable for mechanical etiology of her symptoms. Patient representing to the ED with persistent/worsening GERD symptoms despite anti-reflux medication optimization.    PLAN:  - Appreciate GI recs           - EGD yesterday aborted due to food in stomach, multiple non-bleeding esophageal ulcers found           - repeat EGD and pH testing with Bravo outpatient in 4-8 weeks           - treat ulcers with BID PPI  - Pepcid QD, PPI BID, sucralfate 1g oral suspension q6h, levsin q6  - Monitor I/Os  - OOBA  - DVT ppx    [place] [facility]   [id number] 73F w/ hx of hiatal hernia repair in 2001 who developed hiatal hernia recurrence and now is s/p redo hiatal hernia repair with Toupet fundoplication on 11/30/23 with Dr. Galaviz. She has experienced worsening GERD symptoms since her last surgery, Upper GI study on 12/27 was unremarkable for mechanical etiology of her symptoms. Patient representing to the ED with persistent/worsening GERD symptoms despite anti-reflux medication optimization.    PLAN:  - Appreciate GI recs           - EGD yesterday aborted due to food in stomach, multiple non-bleeding esophageal ulcers found           - repeat EGD and pH testing with Bravo outpatient in 4-8 weeks           - treat ulcers with BID PPI  - Pepcid QD, PPI BID, sucralfate 1g oral suspension q6h, levsin q6  - Monitor I/Os  - OOBA  - DVT ppx    [facility]   [id number] 73F w/ hx of hiatal hernia repair in 2001 who developed hiatal hernia recurrence and now is s/p redo hiatal hernia repair with Toupet fundoplication on 11/30/23 with Dr. Galaviz. She has experienced worsening GERD symptoms since her last surgery, Upper GI study on 12/27 was unremarkable for mechanical etiology of her symptoms. Patient representing to the ED with persistent/worsening GERD symptoms despite anti-reflux medication optimization.    PLAN:  - Appreciate GI recs           - EGD yesterday aborted due to food in stomach           - multiple non-bleeding esophageal ulcers found           - repeat EGD and pH testing with Bravo outpatient in 4-8 weeks           - treat ulcers with BID PPI  - Pepcid QD, PPI BID, sucralfate 1g oral suspension q6h, levsin q6  - Monitor I/Os  - OOBA  - DVT ppx    [facility]   [id number] 73F w/ hx of hiatal hernia repair in 2001 who developed hiatal hernia recurrence and now is s/p redo hiatal hernia repair with Toupet fundoplication on 11/30/23 with Dr. Galaviz. She has experienced worsening GERD symptoms since her last surgery, Upper GI study on 12/27 was unremarkable for mechanical etiology of her symptoms. Patient representing to the ED with persistent/worsening GERD symptoms despite anti-reflux medication optimization.    PLAN:  - Appreciate GI recs           - EGD yesterday aborted due to food in stomach           - multiple non-bleeding esophageal ulcers found           - repeat EGD and pH testing with Bravo outpatient in 4-8 weeks           - treat ulcers with BID PPI  - Pepcid QD, PPI BID, sucralfate 1g oral suspension q6h, levsin q6  - Monitor I/Os  - OOBA  - DVT ppx    [facility]   [id number] 73F w/ hx of hiatal hernia repair in 2001 who developed hiatal hernia recurrence and now is s/p redo hiatal hernia repair with Toupet fundoplication on 11/30/23 with Dr. Galaviz. She has experienced worsening GERD symptoms since her last surgery, Upper GI study on 12/27 was unremarkable for mechanical etiology of her symptoms. Patient representing to the ED with persistent/worsening GERD symptoms despite anti-reflux medication optimization.    PLAN:  - Gastric emptying study  - Appreciate GI recs           - EGD yesterday aborted due to food in stomach           - multiple non-bleeding esophageal ulcers found           - repeat EGD and pH testing with Bravo outpatient in 4-8 weeks           - treat ulcers with BID PPI  - Pepcid QD, PPI BID, sucralfate 1g oral suspension q6h, levsin q6  - Monitor I/Os  - OOBA  - DVT ppx    [facility]   [id number] 73F w/ hx of hiatal hernia repair in 2001 who developed hiatal hernia recurrence and now is s/p redo hiatal hernia repair with Toupet fundoplication on 11/30/23 with Dr. Galaviz. She has experienced worsening GERD symptoms since her last surgery, Upper GI study on 12/27 was unremarkable for mechanical etiology of her symptoms. Patient representing to the ED with persistent/worsening GERD symptoms despite anti-reflux medication optimization.    PLAN:  - Gastric emptying study  - Appreciate GI recs           - EGD yesterday aborted due to food in stomach           - multiple non-bleeding esophageal ulcers found           - repeat EGD and pH testing with Bravo outpatient in 4-8 weeks           - treat ulcers with BID PPI  - Pepcid QD, PPI BID, sucralfate 1g oral suspension q6h, levsin q6  - Monitor I/Os  - OOBA  - DVT ppx    [facility]   [id number] 73F w/ hx of hiatal hernia repair in 2001 who developed hiatal hernia recurrence and now is s/p redo hiatal hernia repair with Toupet fundoplication on 11/30/23 with Dr. Galaviz. She has experienced worsening GERD symptoms since her last surgery, Upper GI study on 12/27 was unremarkable for mechanical etiology of her symptoms. Patient representing to the ED with persistent/worsening GERD symptoms despite anti-reflux medication optimization.    PLAN:  - Gastric emptying study  - Reglan q6 added  - Appreciate GI recs           - EGD yesterday aborted due to food in stomach           - multiple non-bleeding esophageal ulcers found           - repeat EGD and pH testing with Bravo outpatient in 4-8 weeks           - treat ulcers with BID PPI  - Pepcid QD, PPI BID, sucralfate 1g oral suspension q6h, levsin q6  - Monitor I/Os  - OOBA  - DVT ppx    [place] [facility]   [id number] 73F w/ hx of hiatal hernia repair in 2001 who developed hiatal hernia recurrence and now is s/p redo hiatal hernia repair with Toupet fundoplication on 11/30/23 with Dr. Galaviz. She has experienced worsening GERD symptoms since her last surgery, Upper GI study on 12/27 was unremarkable for mechanical etiology of her symptoms. Patient representing to the ED with persistent/worsening GERD symptoms despite anti-reflux medication optimization.    PLAN:  - Gastric emptying study  - Reglan q6 added  - Appreciate GI recs           - EGD yesterday aborted due to food in stomach           - multiple non-bleeding esophageal ulcers found           - repeat EGD and pH testing with Bravo outpatient in 4-8 weeks           - treat ulcers with BID PPI  - Pepcid QD, PPI BID, sucralfate 1g oral suspension q6h, levsin q6  - Monitor I/Os  - OOBA  - DVT ppx    [place] [facility]   [id number]

## 2024-01-03 NOTE — PROGRESS NOTE ADULT - ASSESSMENT
73 year old female with history of HTN, HLD, chronic GERD/LPR, recent laparoscopic hiatal hernia repair and fundoplication [11/30/2023] who presents with burning throat pain.  Patient follows with GI physician Dr. Gonzalez for chronic GERD/LPR for which she was previously taking PPI BID with famotidine at night with control of symptoms.  She underwent laparoscopic repair of hiatal hernia with fundoplication on 11/30/2023. Now with recurrent reflux and epigastric pain.      #Epigastric pain/ dyspepsia   - found to have esophageal thickening on CT- may indicated esophagitis causing some of the discomfort- ddx- erosive esophagitis from reflux vs infectious (ie viral vs fungal)  - negative UGIS for obstructing esophageal lesions  - S/p EGD 1/2 with severe esophageal ulcerations, likely from acid reflux. Unable to complete the EGD due to food in stomach  - Discussed with Dr. Galaviz and our motility specialist. Will not pursue repeat EGD inpatient at this time. Will treat for esophageal ulceration and repeat EGD with pH testing (Bravo) outpatient after PPI BID x 8 weeks. Patient agreed to the plan.   - Gastric emptying study ongoing.     Recommendations:  -PPI BID x 8 weeks  -F/u Dr. Gonzalez outpatient and repeat EGD +/- Bravo in 8 weeks  -Sucralfate 1g oral suspension q6h x 2 weeks  -Diet as tolerated  -If positive gastric emptying study, may trial Reglan if no contraindication.   -GI team will sign off. Please reconsult as needed.     Note incomplete until finalized by attending signature/attestation.    Bruna Sorto  GI/Hepatology Fellow    MONDAY-FRIDAY 8AM-5PM:  Pager# [phone] ([facility]) or [phone] ([facility])    NON-URGENT CONSULTS:  Please email [email or web address]  AT NIGHT AND ON WEEKENDS:  Contact on-call GI fellow via answering service ([phone]) from 5pm-8am and on weekends/holidays   73 year old female with history of HTN, HLD, chronic GERD/LPR, recent laparoscopic hiatal hernia repair and fundoplication [11/30/2023] who presents with burning throat pain.  Patient follows with GI physician Dr. Gonzalez for chronic GERD/LPR for which she was previously taking PPI BID with famotidine at night with control of symptoms.  She underwent laparoscopic repair of hiatal hernia with fundoplication on 11/30/2023. Now with recurrent reflux and epigastric pain.      #Epigastric pain/ dyspepsia   - found to have esophageal thickening on CT- may indicated esophagitis causing some of the discomfort- ddx- erosive esophagitis from reflux vs infectious (ie viral vs fungal)  - negative UGIS for obstructing esophageal lesions  - S/p EGD 1/2 with severe esophageal ulcerations, likely from acid reflux. Unable to complete the EGD due to food in stomach  - Discussed with Dr. Galaviz and our motility specialist. Will not pursue repeat EGD inpatient at this time. Will treat for esophageal ulceration and repeat EGD with pH testing (Bravo) outpatient after PPI BID x 8 weeks. Patient agreed to the plan.   - Gastric emptying study ongoing.     Recommendations:  -PPI BID x 8 weeks  -F/u Dr. Gonzalez outpatient and repeat EGD +/- Bravo in 8 weeks  -Sucralfate 1g oral suspension q6h x 2 weeks  -Diet as tolerated  -If positive gastric emptying study, may trial Reglan if no contraindication.   -GI team will sign off. Please reconsult as needed.     Note incomplete until finalized by attending signature/attestation.    Bruna Sorto  GI/Hepatology Fellow    MONDAY-FRIDAY 8AM-5PM:  Pager# [phone] ([facility]) or [phone] ([facility])    NON-URGENT CONSULTS:  Please email [email or web address]  AT NIGHT AND ON WEEKENDS:  Contact on-call GI fellow via answering service ([phone]) from 5pm-8am and on weekends/holidays

## 2024-01-03 NOTE — PROGRESS NOTE ADULT - SUBJECTIVE AND OBJECTIVE BOX
Surgery Progress Note     Overnight: None    Subjective:  Patient seen and examined bedside during morning rounds.     Vital Signs:  Vital Signs Last 24 Hrs  T(C): 36.7 (03 Jan 2024 00:41), Max: 37.1 (02 Jan 2024 08:33)  T(F): 98 (03 Jan 2024 00:41), Max: 98.8 (02 Jan 2024 08:33)  HR: 77 (03 Jan 2024 00:41) (70 - 93)  BP: 143/87 (03 Jan 2024 00:41) (106/55 - 154/65)  BP(mean): 110 (02 Jan 2024 08:30) (110 - 110)  RR: 18 (03 Jan 2024 00:41) (18 - 20)  SpO2: 94% (03 Jan 2024 00:41) (93% - 100%)    Parameters below as of 03 Jan 2024 00:41  Patient On (Oxygen Delivery Method): room air      Physical Exam:  General: NAD, Sitting in bed comfortably, coughing  Resp: Breathing comfortably on room air  GI/Abd: Soft, nontender, nondistended, laparoscopic incisions well-healed      Labs:                          10.1   5.22  )-----------( 227      ( 01 Jan 2024 07:00 )             30.6   01-01    140  |  105  |  16  ----------------------------<  134<H>  3.5   |  22  |  0.78    Ca    8.3<L>      01 Jan 2024 07:00  Phos  2.0     01-01  Mg     2.0     01-01          I&O's Summary  I&O's Detail    01 Jan 2024 07:01  -  02 Jan 2024 07:00  --------------------------------------------------------  IN:    Oral Fluid: 720 mL  Total IN: 720 mL    OUT:    Voided (mL): 250 mL  Total OUT: 250 mL    Total NET: 470 mL      02 Jan 2024 07:01  -  03 Jan 2024 01:00  --------------------------------------------------------  IN:    Oral Fluid: 730 mL  Total IN: 730 mL    OUT:    Blood Loss (mL): 600 mL    Voided (mL): 300 mL  Total OUT: 900 mL    Total NET: -170 mL                     Surgery Progress Note     Overnight: None    Subjective:  Patient seen and examined bedside during morning rounds. Reports some pain with drinking liquids but able to tolerate regular food better. Ambulating, having bowel function.    Vital Signs:  Vital Signs Last 24 Hrs  T(C): 36.7 (03 Jan 2024 00:41), Max: 37.1 (02 Jan 2024 08:33)  T(F): 98 (03 Jan 2024 00:41), Max: 98.8 (02 Jan 2024 08:33)  HR: 77 (03 Jan 2024 00:41) (70 - 93)  BP: 143/87 (03 Jan 2024 00:41) (106/55 - 154/65)  BP(mean): 110 (02 Jan 2024 08:30) (110 - 110)  RR: 18 (03 Jan 2024 00:41) (18 - 20)  SpO2: 94% (03 Jan 2024 00:41) (93% - 100%)    Parameters below as of 03 Jan 2024 00:41  Patient On (Oxygen Delivery Method): room air      Physical Exam:  General: NAD, Sitting in bed comfortably, coughing  Resp: Breathing comfortably on room air  GI/Abd: Soft, nontender, nondistended, laparoscopic incisions well-healed      Labs:                          10.1   5.22  )-----------( 227      ( 01 Jan 2024 07:00 )             30.6   01-01    140  |  105  |  16  ----------------------------<  134<H>  3.5   |  22  |  0.78    Ca    8.3<L>      01 Jan 2024 07:00  Phos  2.0     01-01  Mg     2.0     01-01          I&O's Summary  I&O's Detail    01 Jan 2024 07:01  -  02 Jan 2024 07:00  --------------------------------------------------------  IN:    Oral Fluid: 720 mL  Total IN: 720 mL    OUT:    Voided (mL): 250 mL  Total OUT: 250 mL    Total NET: 470 mL      02 Jan 2024 07:01 - 03 Jan 2024 01:00  --------------------------------------------------------  IN:    Oral Fluid: 730 mL  Total IN: 730 mL    OUT:    Blood Loss (mL): 600 mL    Voided (mL): 300 mL  Total OUT: 900 mL    Total NET: -170 mL

## 2024-01-03 NOTE — PROGRESS NOTE ADULT - SUBJECTIVE AND OBJECTIVE BOX
Interval Events:   No acute events overnight. Tolerated dinner, still has burning pain.       Hospital Medications:  (pramipexole) Mirapex 3.75 milliGRAM(s) 3.75 milliGRAM(s) Oral at bedtime  atorvastatin 10 milliGRAM(s) Oral at bedtime  dextrose 5% + sodium chloride 0.45% with potassium chloride 20 mEq/L 1000 milliLiter(s) IV Continuous <Continuous>  enoxaparin Injectable 40 milliGRAM(s) SubCutaneous every 24 hours  famotidine   Suspension 40 milliGRAM(s) Oral daily  hyoscyamine SL 0.125 milliGRAM(s) SubLingual every 6 hours  losartan 100 milliGRAM(s) Oral daily  nystatin Powder 1 Application(s) Topical two times a day  pantoprazole   Suspension 40 milliGRAM(s) Oral every 12 hours  sucralfate suspension 1 Gram(s) Oral every 6 hours      PHYSICAL EXAM:   Vital Signs:  Vital Signs Last 24 Hrs  T(C): 36.6 (03 Jan 2024 14:37), Max: 37.1 (03 Jan 2024 10:43)  T(F): 97.9 (03 Jan 2024 14:37), Max: 98.8 (03 Jan 2024 10:43)  HR: 72 (03 Jan 2024 14:37) (72 - 93)  BP: 139/68 (03 Jan 2024 14:37) (111/68 - 154/78)  BP(mean): --  RR: 18 (03 Jan 2024 14:37) (18 - 18)  SpO2: 95% (03 Jan 2024 14:37) (93% - 95%)    Parameters below as of 03 Jan 2024 14:37  Patient On (Oxygen Delivery Method): room air      Daily     Daily     GENERAL: no acute distress  NEURO: alert and oriented x 3  HEENT: NCAT, no conjunctival pallor appreciated; anicteric sclera  CHEST: no respiratory distress, no accessory muscle use  CARDIAC: regular rate, +S1/S2  ABDOMEN: soft, nontender, no rebound or guarding  EXTREMITIES: warm, well perfused  SKIN: no active lesions noted    LABS: reviewed                        11.3   4.78  )-----------( 241      ( 03 Jan 2024 09:01 )             36.2     01-03    140  |  105  |  14  ----------------------------<  105<H>  4.4   |  25  |  0.93    Ca    8.8      03 Jan 2024 09:01  Phos  1.8     01-03  Mg     1.8     01-03

## 2024-01-04 ENCOUNTER — TRANSCRIPTION ENCOUNTER (OUTPATIENT)
Age: 74
End: 2024-01-04

## 2024-01-04 VITALS
HEART RATE: 88 BPM | DIASTOLIC BLOOD PRESSURE: 69 MMHG | OXYGEN SATURATION: 97 % | TEMPERATURE: 98 F | SYSTOLIC BLOOD PRESSURE: 117 MMHG | RESPIRATION RATE: 18 BRPM

## 2024-01-04 LAB
ANION GAP SERPL CALC-SCNC: 11 MMOL/L — SIGNIFICANT CHANGE UP (ref 5–17)
ANION GAP SERPL CALC-SCNC: 11 MMOL/L — SIGNIFICANT CHANGE UP (ref 5–17)
BUN SERPL-MCNC: 14 MG/DL — SIGNIFICANT CHANGE UP (ref 7–23)
BUN SERPL-MCNC: 14 MG/DL — SIGNIFICANT CHANGE UP (ref 7–23)
CALCIUM SERPL-MCNC: 9.2 MG/DL — SIGNIFICANT CHANGE UP (ref 8.4–10.5)
CALCIUM SERPL-MCNC: 9.2 MG/DL — SIGNIFICANT CHANGE UP (ref 8.4–10.5)
CHLORIDE SERPL-SCNC: 104 MMOL/L — SIGNIFICANT CHANGE UP (ref 96–108)
CHLORIDE SERPL-SCNC: 104 MMOL/L — SIGNIFICANT CHANGE UP (ref 96–108)
CO2 SERPL-SCNC: 23 MMOL/L — SIGNIFICANT CHANGE UP (ref 22–31)
CO2 SERPL-SCNC: 23 MMOL/L — SIGNIFICANT CHANGE UP (ref 22–31)
CREAT SERPL-MCNC: 0.81 MG/DL — SIGNIFICANT CHANGE UP (ref 0.5–1.3)
CREAT SERPL-MCNC: 0.81 MG/DL — SIGNIFICANT CHANGE UP (ref 0.5–1.3)
EGFR: 76 ML/MIN/1.73M2 — SIGNIFICANT CHANGE UP
EGFR: 76 ML/MIN/1.73M2 — SIGNIFICANT CHANGE UP
GLUCOSE BLDC GLUCOMTR-MCNC: 100 MG/DL — HIGH (ref 70–99)
GLUCOSE BLDC GLUCOMTR-MCNC: 100 MG/DL — HIGH (ref 70–99)
GLUCOSE SERPL-MCNC: 109 MG/DL — HIGH (ref 70–99)
GLUCOSE SERPL-MCNC: 109 MG/DL — HIGH (ref 70–99)
HCT VFR BLD CALC: 32.8 % — LOW (ref 34.5–45)
HCT VFR BLD CALC: 32.8 % — LOW (ref 34.5–45)
HGB BLD-MCNC: 10.4 G/DL — LOW (ref 11.5–15.5)
HGB BLD-MCNC: 10.4 G/DL — LOW (ref 11.5–15.5)
MAGNESIUM SERPL-MCNC: 1.8 MG/DL — SIGNIFICANT CHANGE UP (ref 1.6–2.6)
MAGNESIUM SERPL-MCNC: 1.8 MG/DL — SIGNIFICANT CHANGE UP (ref 1.6–2.6)
MCHC RBC-ENTMCNC: 28 PG — SIGNIFICANT CHANGE UP (ref 27–34)
MCHC RBC-ENTMCNC: 28 PG — SIGNIFICANT CHANGE UP (ref 27–34)
MCHC RBC-ENTMCNC: 31.7 GM/DL — LOW (ref 32–36)
MCHC RBC-ENTMCNC: 31.7 GM/DL — LOW (ref 32–36)
MCV RBC AUTO: 88.2 FL — SIGNIFICANT CHANGE UP (ref 80–100)
MCV RBC AUTO: 88.2 FL — SIGNIFICANT CHANGE UP (ref 80–100)
NRBC # BLD: 0 /100 WBCS — SIGNIFICANT CHANGE UP (ref 0–0)
NRBC # BLD: 0 /100 WBCS — SIGNIFICANT CHANGE UP (ref 0–0)
PHOSPHATE SERPL-MCNC: 2.3 MG/DL — LOW (ref 2.5–4.5)
PHOSPHATE SERPL-MCNC: 2.3 MG/DL — LOW (ref 2.5–4.5)
PLATELET # BLD AUTO: 215 K/UL — SIGNIFICANT CHANGE UP (ref 150–400)
PLATELET # BLD AUTO: 215 K/UL — SIGNIFICANT CHANGE UP (ref 150–400)
POTASSIUM SERPL-MCNC: 3.9 MMOL/L — SIGNIFICANT CHANGE UP (ref 3.5–5.3)
POTASSIUM SERPL-MCNC: 3.9 MMOL/L — SIGNIFICANT CHANGE UP (ref 3.5–5.3)
POTASSIUM SERPL-SCNC: 3.9 MMOL/L — SIGNIFICANT CHANGE UP (ref 3.5–5.3)
POTASSIUM SERPL-SCNC: 3.9 MMOL/L — SIGNIFICANT CHANGE UP (ref 3.5–5.3)
RBC # BLD: 3.72 M/UL — LOW (ref 3.8–5.2)
RBC # BLD: 3.72 M/UL — LOW (ref 3.8–5.2)
RBC # FLD: 15.1 % — HIGH (ref 10.3–14.5)
RBC # FLD: 15.1 % — HIGH (ref 10.3–14.5)
SODIUM SERPL-SCNC: 138 MMOL/L — SIGNIFICANT CHANGE UP (ref 135–145)
SODIUM SERPL-SCNC: 138 MMOL/L — SIGNIFICANT CHANGE UP (ref 135–145)
WBC # BLD: 4.8 K/UL — SIGNIFICANT CHANGE UP (ref 3.8–10.5)
WBC # BLD: 4.8 K/UL — SIGNIFICANT CHANGE UP (ref 3.8–10.5)
WBC # FLD AUTO: 4.8 K/UL — SIGNIFICANT CHANGE UP (ref 3.8–10.5)
WBC # FLD AUTO: 4.8 K/UL — SIGNIFICANT CHANGE UP (ref 3.8–10.5)

## 2024-01-04 PROCEDURE — 74177 CT ABD & PELVIS W/CONTRAST: CPT | Mod: MH

## 2024-01-04 PROCEDURE — 96374 THER/PROPH/DIAG INJ IV PUSH: CPT | Mod: XU

## 2024-01-04 PROCEDURE — 85027 COMPLETE CBC AUTOMATED: CPT

## 2024-01-04 PROCEDURE — 85025 COMPLETE CBC W/AUTO DIFF WBC: CPT

## 2024-01-04 PROCEDURE — 71260 CT THORAX DX C+: CPT | Mod: MA

## 2024-01-04 PROCEDURE — 85730 THROMBOPLASTIN TIME PARTIAL: CPT

## 2024-01-04 PROCEDURE — 93005 ELECTROCARDIOGRAM TRACING: CPT

## 2024-01-04 PROCEDURE — G0378: CPT

## 2024-01-04 PROCEDURE — 96376 TX/PRO/DX INJ SAME DRUG ADON: CPT

## 2024-01-04 PROCEDURE — 82947 ASSAY GLUCOSE BLOOD QUANT: CPT

## 2024-01-04 PROCEDURE — 78264 GASTRIC EMPTYING IMG STUDY: CPT

## 2024-01-04 PROCEDURE — 83605 ASSAY OF LACTIC ACID: CPT

## 2024-01-04 PROCEDURE — 74018 RADEX ABDOMEN 1 VIEW: CPT

## 2024-01-04 PROCEDURE — 82803 BLOOD GASES ANY COMBINATION: CPT

## 2024-01-04 PROCEDURE — 82435 ASSAY OF BLOOD CHLORIDE: CPT

## 2024-01-04 PROCEDURE — 96375 TX/PRO/DX INJ NEW DRUG ADDON: CPT

## 2024-01-04 PROCEDURE — 80048 BASIC METABOLIC PNL TOTAL CA: CPT

## 2024-01-04 PROCEDURE — 86900 BLOOD TYPING SEROLOGIC ABO: CPT

## 2024-01-04 PROCEDURE — 83735 ASSAY OF MAGNESIUM: CPT

## 2024-01-04 PROCEDURE — 84295 ASSAY OF SERUM SODIUM: CPT

## 2024-01-04 PROCEDURE — 99285 EMERGENCY DEPT VISIT HI MDM: CPT

## 2024-01-04 PROCEDURE — 86850 RBC ANTIBODY SCREEN: CPT

## 2024-01-04 PROCEDURE — 82330 ASSAY OF CALCIUM: CPT

## 2024-01-04 PROCEDURE — 80053 COMPREHEN METABOLIC PANEL: CPT

## 2024-01-04 PROCEDURE — 74246 X-RAY XM UPR GI TRC 2CNTRST: CPT

## 2024-01-04 PROCEDURE — 85014 HEMATOCRIT: CPT

## 2024-01-04 PROCEDURE — 84132 ASSAY OF SERUM POTASSIUM: CPT

## 2024-01-04 PROCEDURE — 85610 PROTHROMBIN TIME: CPT

## 2024-01-04 PROCEDURE — 86901 BLOOD TYPING SEROLOGIC RH(D): CPT

## 2024-01-04 PROCEDURE — A9541: CPT

## 2024-01-04 PROCEDURE — 71045 X-RAY EXAM CHEST 1 VIEW: CPT

## 2024-01-04 PROCEDURE — 85018 HEMOGLOBIN: CPT

## 2024-01-04 PROCEDURE — 36415 COLL VENOUS BLD VENIPUNCTURE: CPT

## 2024-01-04 PROCEDURE — 83690 ASSAY OF LIPASE: CPT

## 2024-01-04 PROCEDURE — 82962 GLUCOSE BLOOD TEST: CPT

## 2024-01-04 PROCEDURE — 84100 ASSAY OF PHOSPHORUS: CPT

## 2024-01-04 RX ORDER — SUCRALFATE 1 G
10 TABLET ORAL
Qty: 1200 | Refills: 1
Start: 2024-01-04 | End: 2024-03-03

## 2024-01-04 RX ORDER — METOCLOPRAMIDE HCL 10 MG
5 TABLET ORAL EVERY 6 HOURS
Refills: 0 | Status: DISCONTINUED | OUTPATIENT
Start: 2024-01-04 | End: 2024-01-04

## 2024-01-04 RX ORDER — FAMOTIDINE 10 MG/ML
5 INJECTION INTRAVENOUS
Qty: 150 | Refills: 0
Start: 2024-01-04 | End: 2024-02-02

## 2024-01-04 RX ORDER — PANTOPRAZOLE SODIUM 20 MG/1
1 TABLET, DELAYED RELEASE ORAL
Qty: 30 | Refills: 1
Start: 2024-01-04

## 2024-01-04 RX ORDER — LOSARTAN POTASSIUM 100 MG/1
1 TABLET, FILM COATED ORAL
Qty: 0 | Refills: 0 | DISCHARGE
Start: 2024-01-04

## 2024-01-04 RX ORDER — SUCRALFATE 1 G
10 TABLET ORAL
Qty: 1200 | Refills: 0
Start: 2024-01-04 | End: 2024-02-02

## 2024-01-04 RX ORDER — METOCLOPRAMIDE HCL 10 MG
1 TABLET ORAL
Qty: 120 | Refills: 0
Start: 2024-01-04 | End: 2024-02-02

## 2024-01-04 RX ORDER — ATORVASTATIN CALCIUM 80 MG/1
1 TABLET, FILM COATED ORAL
Qty: 0 | Refills: 0 | DISCHARGE
Start: 2024-01-04

## 2024-01-04 RX ORDER — ATORVASTATIN CALCIUM 80 MG/1
1 TABLET, FILM COATED ORAL
Refills: 0 | DISCHARGE

## 2024-01-04 RX ORDER — METOCLOPRAMIDE HCL 10 MG
5 TABLET ORAL
Qty: 600 | Refills: 0
Start: 2024-01-04 | End: 2024-02-02

## 2024-01-04 RX ORDER — SUCRALFATE 1 G
1 TABLET ORAL
Qty: 120 | Refills: 0
Start: 2024-01-04 | End: 2024-02-02

## 2024-01-04 RX ORDER — FAMOTIDINE 10 MG/ML
5 INJECTION INTRAVENOUS
Qty: 1 | Refills: 0
Start: 2024-01-04 | End: 2024-02-02

## 2024-01-04 RX ADMIN — Medication 1 GRAM(S): at 05:58

## 2024-01-04 RX ADMIN — FAMOTIDINE 40 MILLIGRAM(S): 10 INJECTION INTRAVENOUS at 17:50

## 2024-01-04 RX ADMIN — Medication 1 GRAM(S): at 13:02

## 2024-01-04 RX ADMIN — PANTOPRAZOLE SODIUM 40 MILLIGRAM(S): 20 TABLET, DELAYED RELEASE ORAL at 05:59

## 2024-01-04 RX ADMIN — Medication 0.12 MILLIGRAM(S): at 18:05

## 2024-01-04 RX ADMIN — Medication 0.12 MILLIGRAM(S): at 05:59

## 2024-01-04 RX ADMIN — Medication 85 MILLIMOLE(S): at 13:03

## 2024-01-04 RX ADMIN — LOSARTAN POTASSIUM 100 MILLIGRAM(S): 100 TABLET, FILM COATED ORAL at 05:59

## 2024-01-04 RX ADMIN — Medication 1 GRAM(S): at 18:05

## 2024-01-04 RX ADMIN — PANTOPRAZOLE SODIUM 40 MILLIGRAM(S): 20 TABLET, DELAYED RELEASE ORAL at 18:05

## 2024-01-04 RX ADMIN — Medication 0.12 MILLIGRAM(S): at 00:59

## 2024-01-04 RX ADMIN — Medication 5 MILLIGRAM(S): at 18:05

## 2024-01-04 RX ADMIN — Medication 1 GRAM(S): at 00:59

## 2024-01-04 RX ADMIN — ENOXAPARIN SODIUM 40 MILLIGRAM(S): 100 INJECTION SUBCUTANEOUS at 16:46

## 2024-01-04 RX ADMIN — NYSTATIN CREAM 1 APPLICATION(S): 100000 CREAM TOPICAL at 05:58

## 2024-01-04 RX ADMIN — Medication 0.12 MILLIGRAM(S): at 13:03

## 2024-01-04 NOTE — PROGRESS NOTE ADULT - REASON FOR ADMISSION
Worsening GERD symptoms after hiatal hernia repair.

## 2024-01-04 NOTE — CHART NOTE - NSCHARTNOTEFT_GEN_A_CORE
Called by surgery team for opinion of inpatient Botox injection for delayed gastric emptying.     Patient denied chest/abdominal pain with eating, and was able to tolerate solid food without nausea/vomiting or dysphagia. Patient is having BM's and passing gas. No concern for bowel obstruction clinically.  Patient prefers to not do any inpatient procedures at this time given she feels well symptomatically. She will follow up with Dr. Gonzalez outpatient.     May consider Reglan as needed if patient becomes symptomatic.

## 2024-01-04 NOTE — PROGRESS NOTE ADULT - ASSESSMENT
73F w/ hx of hiatal hernia repair in 2001 who developed hiatal hernia recurrence and now is s/p redo hiatal hernia repair with Toupet fundoplication on 11/30/23 with Dr. Galaviz. She has experienced worsening GERD symptoms since her last surgery, Upper GI study on 12/27 was unremarkable for mechanical etiology of her symptoms. Patient representing to the ED with persistent/worsening GERD symptoms despite anti-reflux medication optimization.    PLAN:  - Gastric emptying study solid component   - Reglan q6  - Appreciate GI recs           - multiple non-bleeding esophageal ulcers found           - repeat EGD and pH testing with Bravo outpatient in 4-8 weeks           - treat ulcers with BID PPI  - Pepcid QD, PPI BID, sucralfate 1g oral suspension q6h, levsin q6  - Monitor I/Os  - OOBA  - DVT ppx    [facility]   [id number]       73F w/ hx of hiatal hernia repair in 2001 who developed hiatal hernia recurrence and now is s/p redo hiatal hernia repair with Toupet fundoplication on 11/30/23 with Dr. Galaviz. She has experienced worsening GERD symptoms since her last surgery, Upper GI study on 12/27 was unremarkable for mechanical etiology of her symptoms. Patient representing to the ED with persistent/worsening GERD symptoms despite anti-reflux medication optimization.    PLAN:  - Gastric emptying study solid component   - Reglan q6  - Appreciate GI recs           - multiple non-bleeding esophageal ulcers found           - repeat EGD and pH testing with Bravo outpatient in 4-8 weeks           - treat ulcers with BID PPI  - Pepcid QD, PPI BID, sucralfate 1g oral suspension q6h, levsin q6  - Monitor I/Os  - OOBA  - DVT ppx    [facility] Surgery   [id number]       73F w/ hx of hiatal hernia repair in 2001 who developed hiatal hernia recurrence and now is s/p redo hiatal hernia repair with Toupet fundoplication on 11/30/23 with Dr. Galaviz. She has experienced worsening GERD symptoms since her last surgery, Upper GI study on 12/27 was unremarkable for mechanical etiology of her symptoms. Patient representing to the ED with persistent/worsening GERD symptoms despite anti-reflux medication optimization.    PLAN:  - Gastric emptying study solid component today  - NPO since midnight  - Appreciate GI recs           - multiple non-bleeding esophageal ulcers found           - repeat EGD and pH testing with Bravo outpatient in 4-8 weeks           - treat ulcers with BID PPI           - possible trial reglan q6  - Pepcid QD, PPI BID, sucralfate 1g oral suspension q6h, levsin q6  - Monitor I/Os  - OOBA  - DVT ppx    [facility] Surgery   [id number]       73F w/ hx of hiatal hernia repair in 2001 who developed hiatal hernia recurrence and now is s/p redo hiatal hernia repair with Toupet fundoplication on 11/30/23 with Dr. Galaviz. She has experienced worsening GERD symptoms since her last surgery, Upper GI study on 12/27 was unremarkable for mechanical etiology of her symptoms. Patient representing to the ED with persistent/worsening GERD symptoms despite anti-reflux medication optimization.    PLAN:  - Gastric emptying study solid component today  - NPO since midnight  - Appreciate GI recs           - multiple non-bleeding esophageal ulcers found           - repeat EGD and pH testing with Bravo outpatient in 4-8 weeks           - treat ulcers with BID PPI           - possible trial reglan q6  - Pepcid QD, PPI BID, sucralfate 1g oral suspension q6h, levsin q6  - Monitor I/Os  - OOBA  - DVT ppx    [facility]   [id number]

## 2024-01-04 NOTE — PROGRESS NOTE ADULT - ATTENDING COMMENTS
Pt seen and examined  Agree with note which was reviewed and edited where appropriate.  D/W patient, RN, residents and Fellow  feels well no pain on swallowing  f/u GE study possible D/C today

## 2024-01-04 NOTE — PROGRESS NOTE ADULT - SUBJECTIVE AND OBJECTIVE BOX
Surgery Progress Note     Overnight: None    Subjective:  Patient seen and examined bedside during morning rounds.     Vital Signs Last 24 Hrs  T(C): 36.6 (04 Jan 2024 00:46), Max: 37.4 (03 Jan 2024 21:10)  T(F): 97.9 (04 Jan 2024 00:46), Max: 99.4 (03 Jan 2024 21:10)  HR: 78 (04 Jan 2024 00:46) (72 - 88)  BP: 147/87 (04 Jan 2024 00:46) (117/62 - 154/78)  BP(mean): --  RR: 18 (04 Jan 2024 00:46) (18 - 18)  SpO2: 95% (04 Jan 2024 00:46) (93% - 95%)    Parameters below as of 04 Jan 2024 00:46  Patient On (Oxygen Delivery Method): room air      Physical Exam:  General: NAD, Sitting in bed comfortably, coughing  Resp: Breathing comfortably on room air  GI/Abd: Soft, nontender, nondistended, laparoscopic incisions well-healed      Labs:                        11.3   4.78  )-----------( 241      ( 03 Jan 2024 09:01 )             36.2   01-03    140  |  105  |  14  ----------------------------<  105<H>  4.4   |  25  |  0.93    Ca    8.8      03 Jan 2024 09:01  Phos  1.8     01-03  Mg     1.8     01-03        I&O's Summary    02 Jan 2024 07:01  -  03 Jan 2024 07:00  --------------------------------------------------------  IN: 1255 mL / OUT: 900 mL / NET: 355 mL    03 Jan 2024 07:01  -  04 Jan 2024 01:36  --------------------------------------------------------  IN: 900 mL / OUT: 250 mL / NET: 650 mL           Surgery Progress Note     Overnight: None    Subjective:  Patient seen and examined bedside during morning rounds. Notes that she ate yesterday and had no sx of GERD.    Vital Signs Last 24 Hrs  T(C): 36.6 (04 Jan 2024 00:46), Max: 37.4 (03 Jan 2024 21:10)  T(F): 97.9 (04 Jan 2024 00:46), Max: 99.4 (03 Jan 2024 21:10)  HR: 78 (04 Jan 2024 00:46) (72 - 88)  BP: 147/87 (04 Jan 2024 00:46) (117/62 - 154/78)  BP(mean): --  RR: 18 (04 Jan 2024 00:46) (18 - 18)  SpO2: 95% (04 Jan 2024 00:46) (93% - 95%)    Parameters below as of 04 Jan 2024 00:46  Patient On (Oxygen Delivery Method): room air      Physical Exam:  General: NAD, Sitting in chair comfortably, no coughing  Resp: Breathing comfortably on room air  GI/Abd: Soft, nontender, nondistended, laparoscopic incisions well-healed      Labs:                        11.3   4.78  )-----------( 241      ( 03 Jan 2024 09:01 )             36.2   01-03    140  |  105  |  14  ----------------------------<  105<H>  4.4   |  25  |  0.93    Ca    8.8      03 Jan 2024 09:01  Phos  1.8     01-03  Mg     1.8     01-03        I&O's Summary    02 Jan 2024 07:01  -  03 Jan 2024 07:00  --------------------------------------------------------  IN: 1255 mL / OUT: 900 mL / NET: 355 mL    03 Jan 2024 07:01  -  04 Jan 2024 01:36  --------------------------------------------------------  IN: 900 mL / OUT: 250 mL / NET: 650 mL

## 2024-01-04 NOTE — DISCHARGE NOTE NURSING/CASE MANAGEMENT/SOCIAL WORK - NSDCPEFALRISK_GEN_ALL_CORE
For information on Fall & Injury Prevention, visit: [email or web address] OR  [email or web address] OR  https://www.cdc.gov/steadi/patient.html For information on Fall & Injury Prevention, visit: [email or web address]/[email or web address] OR  [email or web address].[email or web address]/[email or web address]-[email or web address]-injury OR  https://www.cdc.gov/steadi/patient.html

## 2024-01-04 NOTE — DISCHARGE NOTE NURSING/CASE MANAGEMENT/SOCIAL WORK - PATIENT PORTAL LINK FT
You can access the FollowMyHealth Patient Portal offered by [facility] by registering at the following website: [email or web address]. By joining [employer]’s FollowMyHealth portal, you will also be able to view your health information using other applications (apps) compatible with our system. You can access the FollowMyHealth Patient Portal offered by [facility] by registering at the following website: [email or web address]. By joining [employer]’s FollowMyHealth portal, you will also be able to view your health information using other applications (apps) compatible with our system.

## 2024-01-06 ENCOUNTER — NON-APPOINTMENT (OUTPATIENT)
Age: 74
End: 2024-01-06

## 2024-01-08 ENCOUNTER — NON-APPOINTMENT (OUTPATIENT)
Age: 74
End: 2024-01-08

## 2024-01-08 ENCOUNTER — APPOINTMENT (OUTPATIENT)
Dept: CARDIOLOGY | Facility: CLINIC | Age: 74
End: 2024-01-08
Payer: MEDICARE

## 2024-01-08 VITALS
OXYGEN SATURATION: 94 % | SYSTOLIC BLOOD PRESSURE: 108 MMHG | HEART RATE: 104 BPM | WEIGHT: 178 LBS | BODY MASS INDEX: 32.56 KG/M2 | DIASTOLIC BLOOD PRESSURE: 72 MMHG

## 2024-01-08 VITALS — SYSTOLIC BLOOD PRESSURE: 104 MMHG | DIASTOLIC BLOOD PRESSURE: 62 MMHG

## 2024-01-08 DIAGNOSIS — R00.0 TACHYCARDIA, UNSPECIFIED: ICD-10-CM

## 2024-01-08 PROCEDURE — 99214 OFFICE O/P EST MOD 30 MIN: CPT

## 2024-01-08 PROCEDURE — 93000 ELECTROCARDIOGRAM COMPLETE: CPT

## 2024-01-08 NOTE — HISTORY OF PRESENT ILLNESS
[FreeTextEntry1] : 11/30/2023 laparoscopic enlargement.  Was uncomplicated but symptoms, primarily postprandial retching, recurred within weeks after the procedure.  COVID-19 infection in December 15 treated with Paxlovid.  Readmitted around [place] with recurrent symptoms and found to have esophageal ulcer and delayed gastric emptying.  Discharged 4 days ago.  Just prior to discharge she is reports that her IV was flushed and she became very dizzy and a cold sweat with heart racing.  Since then, she checks her pulse on her Apple Watch and has typically 140 110 bpm.  Also monitors her blood pressure at home which have been very variable at 1 time as low as 86/51 at other times is 154 systolic.  She has not felt dizzy but she feels there is no correlation between her blood pressure and dizziness and the fact she was dizzy here today with a normal blood pressure and a pulse monitor for beats per minute in sinus rhythm.  Preop hemoglobin was 12 5 hematocrit of 40.  At discharge hemoglobin 10.4 hematocrit 32.8.  BUN was 14 with creatinine 0.81.  No c/o chest, throat,jaw, arm or upper back discomfort.  No dyspnea, orthopnea or PND.  No  syncope.  No edema or claudication.

## 2024-01-09 ENCOUNTER — OUTPATIENT (OUTPATIENT)
Dept: OUTPATIENT SERVICES | Facility: HOSPITAL | Age: 74
LOS: 1 days | End: 2024-01-09
Payer: MEDICARE

## 2024-01-09 ENCOUNTER — APPOINTMENT (OUTPATIENT)
Dept: INTERNAL MEDICINE | Facility: CLINIC | Age: 74
End: 2024-01-09
Payer: MEDICARE

## 2024-01-09 DIAGNOSIS — R21 RASH AND OTHER NONSPECIFIC SKIN ERUPTION: ICD-10-CM

## 2024-01-09 DIAGNOSIS — I10 ESSENTIAL (PRIMARY) HYPERTENSION: ICD-10-CM

## 2024-01-09 DIAGNOSIS — R05.9 COUGH, UNSPECIFIED: ICD-10-CM

## 2024-01-09 DIAGNOSIS — Z96.643 PRESENCE OF ARTIFICIAL HIP JOINT, BILATERAL: Chronic | ICD-10-CM

## 2024-01-09 PROCEDURE — G0463: CPT

## 2024-01-09 PROCEDURE — 99214 OFFICE O/P EST MOD 30 MIN: CPT

## 2024-01-12 ENCOUNTER — APPOINTMENT (OUTPATIENT)
Dept: SURGERY | Facility: CLINIC | Age: 74
End: 2024-01-12
Payer: MEDICARE

## 2024-01-12 VITALS
SYSTOLIC BLOOD PRESSURE: 151 MMHG | DIASTOLIC BLOOD PRESSURE: 81 MMHG | HEART RATE: 87 BPM | RESPIRATION RATE: 17 BRPM | OXYGEN SATURATION: 96 % | TEMPERATURE: 96.7 F

## 2024-01-12 DIAGNOSIS — K21.9 GASTRO-ESOPHAGEAL REFLUX DISEASE W/OUT ESOPHAGITIS: ICD-10-CM

## 2024-01-12 PROCEDURE — 99214 OFFICE O/P EST MOD 30 MIN: CPT | Mod: 24

## 2024-01-12 NOTE — PHYSICAL EXAM
[Normal Breath Sounds] : Normal breath sounds [Normal Heart Sounds] : normal heart sounds [No Rash or Lesion] : No rash or lesion [Alert] : alert [Oriented to Person] : oriented to person [Oriented to Place] : oriented to place [Oriented to Time] : oriented to time [JVD] : no jugular venous distention  [Abdominal Masses] : No abdominal masses [Abdomen Tenderness] : ~T ~M No abdominal tenderness [de-identified] : no acute distress noted, well noureishd [de-identified] : NC/AT [de-identified] : soft, non tender, well healed surgical incisions

## 2024-01-12 NOTE — HISTORY OF PRESENT ILLNESS
[de-identified] : Haley is a 73-year-old female here for a post-op visit, s/p laparoscopic hiatal hernia repair on 11/30/23.   [facility] for worsening GERD and dysphagia. She was found to have esophageal ulcers at that time likely secondary to GERD and found to have some gastroparesis. She was treated medically and has been doing well, currently able to tolerate PO without pain or discomfort. Cough is improving. Patient reports having sensation of regurg, but is intermittent and manageable. she is otherwise passing flatus, having bowel movements. No nausea or vomiting.

## 2024-01-12 NOTE — ASSESSMENT
[FreeTextEntry1] : Haley is a 72yo F who presents s/p Laparoscopic hiatal hernia repair with toupet fundoplication. She was subsequently found to have esophageal ulcers after developing GERD symptoms. AT the time she was also found to have gastroparesis and delayed gastric emptying.  She has no complaints of bloating or excessive burping she has been treated with PPI, Carafate and reglan and has been improving.  She is doing well on the current antacid regimen which should continue for a total of 8 to 12 weeks from starting.  I would recommend a repeat Bravo as well as Botox of the pylorus.  After these are completed we can repeat her Bravo at a later date if it is markedly positive we could convert her from a partial to a full wrap

## 2024-01-12 NOTE — PLAN
[FreeTextEntry1] : - continue reglan, PPI, Carafate - f/u with GI for repeat endoscopy to monitor for healing of esophageal ulcers - continue diet as tolerated Bravo and Botox follow-up with me and 1 month

## 2024-01-16 ENCOUNTER — APPOINTMENT (OUTPATIENT)
Dept: DERMATOLOGY | Facility: CLINIC | Age: 74
End: 2024-01-16

## 2024-01-18 ENCOUNTER — APPOINTMENT (OUTPATIENT)
Dept: CARDIOLOGY | Facility: CLINIC | Age: 74
End: 2024-01-18
Payer: MEDICARE

## 2024-01-18 ENCOUNTER — APPOINTMENT (OUTPATIENT)
Dept: GASTROENTEROLOGY | Facility: CLINIC | Age: 74
End: 2024-01-18
Payer: MEDICARE

## 2024-01-18 VITALS
SYSTOLIC BLOOD PRESSURE: 176 MMHG | WEIGHT: 186 LBS | BODY MASS INDEX: 34.23 KG/M2 | HEART RATE: 104 BPM | OXYGEN SATURATION: 95 % | DIASTOLIC BLOOD PRESSURE: 96 MMHG | HEIGHT: 62 IN

## 2024-01-18 VITALS — DIASTOLIC BLOOD PRESSURE: 84 MMHG | SYSTOLIC BLOOD PRESSURE: 150 MMHG

## 2024-01-18 DIAGNOSIS — R42 DIZZINESS AND GIDDINESS: ICD-10-CM

## 2024-01-18 DIAGNOSIS — R94.31 ABNORMAL ELECTROCARDIOGRAM [ECG] [EKG]: ICD-10-CM

## 2024-01-18 DIAGNOSIS — Z87.19 PERSONAL HISTORY OF OTHER DISEASES OF THE DIGESTIVE SYSTEM: ICD-10-CM

## 2024-01-18 PROCEDURE — 99205 OFFICE O/P NEW HI 60 MIN: CPT

## 2024-01-18 PROCEDURE — G2211 COMPLEX E/M VISIT ADD ON: CPT

## 2024-01-18 PROCEDURE — 99213 OFFICE O/P EST LOW 20 MIN: CPT

## 2024-01-18 PROCEDURE — 99215 OFFICE O/P EST HI 40 MIN: CPT

## 2024-01-18 NOTE — ASSESSMENT
[FreeTextEntry1] : 73-year-old pleasant female with history of hypertension/paraesophageal hernia status post fundoplication originally 22 years ago, with repeat fundoplication performed 11/30/2023, with new onset of retrosternal/esophageal burning sensation limiting food intake, found to have new onset of gastroparesis and esophageal ulcers on upper endoscopy.  Given her lack of symptoms, suspicious about nuclear medicine gastric emptying scan demonstrating 94% retention of solids while inpatient.  I recommend that she repeat her nuclear medicine gastric emptying scan now that she is outpatient and has been on PPI.  She has never been cleared for gastric outlet obstruction while she was in the hospital due to limited views of her stomach.  I recommend that she undergo repeat EGD +/- Botox injection depending on nuclear medicine gastric emptying scan results.  Given her history of esophageal ulcers, Bravo pH capsule not indicated as it is a 99.5% certainty that she has reflux.    1.  Repeat nuclear medicine gastric emptying scan solids/liquids 2.  Upper endoscopy +/- Botox injection depending on NM GES study

## 2024-01-18 NOTE — ADDENDUM
[FreeTextEntry1] : 11/28/2023: 11/27/2023 echo reveals hyperdynamic LV systolic function with an ejection fraction of 66%.  Mild concentric hypertrophy with grade 1 diastolic dysfunction.  Calcified mitral sinus with minimal MR.  Calcified aortic valve with normal excursion and only trace AI.  No pulmonary pretension.  May proceed with surgery/anesthesia as planned.

## 2024-01-18 NOTE — HISTORY OF PRESENT ILLNESS
[FreeTextEntry1] : Presents in follow-up accompanied by her son.  She is much improved and for the past 6 days no longer wearing any low blood pressure readings and dizziness has resolved.  Off chlorthalidone, however she has developed significant ankle edema.  No chest, throat or jaw discomfort.

## 2024-01-18 NOTE — CONSULT LETTER
[Dear  ___] : Dear  [unfilled], [Consult Letter:] : I had the pleasure of evaluating your patient, [unfilled]. [Please see my note below.] : Please see my note below. [Consult Closing:] : Thank you very much for allowing me to participate in the care of this patient.  If you have any questions, please do not hesitate to contact me. [Sincerely,] : Sincerely, [FreeTextEntry2] : Dr. Ethan Galaviz [FreeTextEntry3] : Haydee Bernstein MD Director of GI Motility, [facility]  of Medicine Division of Gastroenterology [facility] at [address], [address] [place] [address] Tel: [phone] Motility Appts-Motility Coordinator: Jerrod Noel: [phone]

## 2024-01-18 NOTE — HISTORY OF PRESENT ILLNESS
[Other Location: e.g. School (Enter Location, City,State)___] : at [unfilled], at the time of the visit. [Medical Office: ([facility])___] : at the medical office located in  [Family Member] : family member [Verbal consent obtained from patient] : the patient, [unfilled] [FreeTextEntry1] : 73-year-old pleasant female with hypertension/history of intrathoracic/paraesophageal hernia status post fundoplication 22 years ago, with redo fundoplication November 2023 for chronic cough and moderate size hiatal hernia here for initial consultation for new diagnosis of gastroparesis post fundoplication.  History obtained via patient and her son  November 9, 2023 Nuclear medicine gastric emptying scan 10% retention of food contents at 4 hours  Barium esophagram October 2023 demonstrates moderate size hiatal hernia, paraesophageal No esophageal manometry performed preoperatively  As per patient and son, patient had symptoms of only chronic cough, she denied any regurgitation/reflux symptoms at that time.  She had her fundoplication with laparoscopic hernia repair 11/30/2023.  Post surgery, she began to develop burning in her throat, burning in her chest without regurgitation.  Patient states that the burning in her throat was so bad that she was unable to eat.  She did not have any dysphagia to solids or liquids.  Her symptoms were mainly odynophagia.  She was admitted to the hospital, had nuclear medicine gastric emptying scan performed, which demonstrated 94% retention of solids at 4 hours.  She had a upper endoscopy performed in the hospital, which was limited due to large presence of food in the stomach, unable to clear for gastric outlet obstruction or gastric ulcers.  She was noted to have esophageal ulcers at that time.  Patient is here today for further evaluation/consultation for new diagnosis of gastroparesis Since starting pantoprazole twice a day upon discharge in the hospital, she no longer has throat burning and has been able to tolerate eating.  She again denies any dysphagia to liquids and solids.  She denies any nausea/change in appetites/regurgitation or reflux symptoms.  She typically has a bowel movement daily, with assistance of a stool softener.  She is eating 3 meals a day.  In the past 4 months, she has gained 30 pounds.

## 2024-01-18 NOTE — REVIEW OF SYSTEMS
[TextEntry] : GEN: Denies appetite change, unusual weakness, bleeding, fever, chills, recent trauma or infection. Weight has been increasing and appetite has been voracious of late. HEENT: no vision change or epistaxis NECK: Thyroid nodules LUNGS: No hemoptysis GASTROINTESTINAL: No abdominal pain, nausea, vomiting, hematemesis, diarrhea, constipation, hematochezia, or melena. : No dysuria or frequency. No hematuria. Nocturia [default value] No erectile dysfunction  SKIN: Denies rash, easy bruising or pruritus. BJE: See HPI. NEURO: Denies headache or vertigo. No amaurosis. No new focal motor, sensory or speech symptoms. PSYCH: No anxiety or depression.

## 2024-01-18 NOTE — PLAN
[TextEntry] : Resume chlorthalidone.  Continue to hold losartan All medications and supplements reviewed and verified with patient; no other changes. Continue to monitor blood pressure at home Hydration encouraged Measured postural change; reminded to exercise marching in place before standing with alternating knee lifts Follow-up 6 weeks .

## 2024-01-19 RX ORDER — NAPROXEN 250 MG/1
250 TABLET ORAL
Qty: 60 | Refills: 0 | Status: ACTIVE | COMMUNITY
Start: 2023-03-02 | End: 1900-01-01

## 2024-01-21 ENCOUNTER — NON-APPOINTMENT (OUTPATIENT)
Age: 74
End: 2024-01-21

## 2024-01-22 ENCOUNTER — APPOINTMENT (OUTPATIENT)
Dept: GASTROENTEROLOGY | Facility: CLINIC | Age: 74
End: 2024-01-22

## 2024-01-25 ENCOUNTER — APPOINTMENT (OUTPATIENT)
Dept: NUCLEAR MEDICINE | Facility: HOSPITAL | Age: 74
End: 2024-01-25

## 2024-01-25 ENCOUNTER — OUTPATIENT (OUTPATIENT)
Dept: OUTPATIENT SERVICES | Facility: HOSPITAL | Age: 74
LOS: 1 days | End: 2024-01-25

## 2024-01-25 DIAGNOSIS — K31.84 GASTROPARESIS: ICD-10-CM

## 2024-01-25 DIAGNOSIS — Z96.643 PRESENCE OF ARTIFICIAL HIP JOINT, BILATERAL: Chronic | ICD-10-CM

## 2024-01-25 DIAGNOSIS — Z98.49 CATARACT EXTRACTION STATUS, UNSPECIFIED EYE: Chronic | ICD-10-CM

## 2024-01-25 DIAGNOSIS — Z98.890 OTHER SPECIFIED POSTPROCEDURAL STATES: Chronic | ICD-10-CM

## 2024-01-26 ENCOUNTER — EMERGENCY (EMERGENCY)
Facility: HOSPITAL | Age: 74
LOS: 1 days | Discharge: HOME | End: 2024-01-26
Attending: EMERGENCY MEDICINE
Payer: MEDICARE

## 2024-01-26 VITALS
HEART RATE: 94 BPM | OXYGEN SATURATION: 98 % | HEIGHT: 62 IN | RESPIRATION RATE: 20 BRPM | SYSTOLIC BLOOD PRESSURE: 190 MMHG | DIASTOLIC BLOOD PRESSURE: 100 MMHG | WEIGHT: 184.97 LBS | TEMPERATURE: 98 F

## 2024-01-26 DIAGNOSIS — Z98.890 OTHER SPECIFIED POSTPROCEDURAL STATES: Chronic | ICD-10-CM

## 2024-01-26 DIAGNOSIS — Z96.643 PRESENCE OF ARTIFICIAL HIP JOINT, BILATERAL: Chronic | ICD-10-CM

## 2024-01-26 PROCEDURE — 99285 EMERGENCY DEPT VISIT HI MDM: CPT

## 2024-01-26 NOTE — ASSESSMENT
[FreeTextEntry1] : Status post laparoscopic hernia repair followed by the development of esophageal ulcer and the finding of delayed gastric emptying.  Mild sinus tachycardia without evidence of dysrhythmia.  Although her own observations do not suggest any relation to hypotension, and she was not azotemic she has found her blood pressure to be rather low at times and this, I assume, is the explanation.

## 2024-01-26 NOTE — PLAN
[TextEntry] : Discontinue chlorthalidone and losartan All medications and supplements reviewed and verified with patient; no other changes. Continue to monitor blood pressure at home Hydration encouraged Measured postural change; taught to exercise marching in place before standing with alternating knee lifts Follow-up 10 days .

## 2024-01-26 NOTE — RESULTS/DATA
[TextEntry] : ECG: Sinus bradycardia 57 bpm with single APC.  Flat T wave in limb leads and V6.  Left anterior hemiblock.  Right IVCD.  No significant change.  .

## 2024-01-27 VITALS
TEMPERATURE: 98 F | DIASTOLIC BLOOD PRESSURE: 74 MMHG | OXYGEN SATURATION: 98 % | SYSTOLIC BLOOD PRESSURE: 127 MMHG | HEART RATE: 92 BPM | RESPIRATION RATE: 17 BRPM

## 2024-01-27 LAB
ALBUMIN SERPL ELPH-MCNC: 4.6 G/DL — SIGNIFICANT CHANGE UP (ref 3.3–5)
ALP SERPL-CCNC: 140 U/L — HIGH (ref 40–120)
ALT FLD-CCNC: 28 U/L — SIGNIFICANT CHANGE UP (ref 10–45)
ANION GAP SERPL CALC-SCNC: 17 MMOL/L — SIGNIFICANT CHANGE UP (ref 5–17)
AST SERPL-CCNC: 23 U/L — SIGNIFICANT CHANGE UP (ref 10–40)
BASOPHILS # BLD AUTO: 0.03 K/UL — SIGNIFICANT CHANGE UP (ref 0–0.2)
BASOPHILS NFR BLD AUTO: 0.4 % — SIGNIFICANT CHANGE UP (ref 0–2)
BILIRUB SERPL-MCNC: 0.6 MG/DL — SIGNIFICANT CHANGE UP (ref 0.2–1.2)
BUN SERPL-MCNC: 16 MG/DL — SIGNIFICANT CHANGE UP (ref 7–23)
CALCIUM SERPL-MCNC: 10.1 MG/DL — SIGNIFICANT CHANGE UP (ref 8.4–10.5)
CHLORIDE SERPL-SCNC: 92 MMOL/L — LOW (ref 96–108)
CO2 SERPL-SCNC: 25 MMOL/L — SIGNIFICANT CHANGE UP (ref 22–31)
CREAT SERPL-MCNC: 0.61 MG/DL — SIGNIFICANT CHANGE UP (ref 0.5–1.3)
EGFR: 93 ML/MIN/1.73M2 — SIGNIFICANT CHANGE UP
EOSINOPHIL # BLD AUTO: 0 K/UL — SIGNIFICANT CHANGE UP (ref 0–0.5)
EOSINOPHIL NFR BLD AUTO: 0 % — SIGNIFICANT CHANGE UP (ref 0–6)
GLUCOSE SERPL-MCNC: 138 MG/DL — HIGH (ref 70–99)
HCT VFR BLD CALC: 39 % — SIGNIFICANT CHANGE UP (ref 34.5–45)
HGB BLD-MCNC: 12.8 G/DL — SIGNIFICANT CHANGE UP (ref 11.5–15.5)
IMM GRANULOCYTES NFR BLD AUTO: 0.6 % — SIGNIFICANT CHANGE UP (ref 0–0.9)
LYMPHOCYTES # BLD AUTO: 0.94 K/UL — LOW (ref 1–3.3)
LYMPHOCYTES # BLD AUTO: 12.1 % — LOW (ref 13–44)
MCHC RBC-ENTMCNC: 27.6 PG — SIGNIFICANT CHANGE UP (ref 27–34)
MCHC RBC-ENTMCNC: 32.8 GM/DL — SIGNIFICANT CHANGE UP (ref 32–36)
MCV RBC AUTO: 84.1 FL — SIGNIFICANT CHANGE UP (ref 80–100)
MONOCYTES # BLD AUTO: 0.13 K/UL — SIGNIFICANT CHANGE UP (ref 0–0.9)
MONOCYTES NFR BLD AUTO: 1.7 % — LOW (ref 2–14)
NEUTROPHILS # BLD AUTO: 6.65 K/UL — SIGNIFICANT CHANGE UP (ref 1.8–7.4)
NEUTROPHILS NFR BLD AUTO: 85.2 % — HIGH (ref 43–77)
NRBC # BLD: 0 /100 WBCS — SIGNIFICANT CHANGE UP (ref 0–0)
PLATELET # BLD AUTO: 307 K/UL — SIGNIFICANT CHANGE UP (ref 150–400)
POTASSIUM SERPL-MCNC: 2.9 MMOL/L — CRITICAL LOW (ref 3.5–5.3)
POTASSIUM SERPL-SCNC: 2.9 MMOL/L — CRITICAL LOW (ref 3.5–5.3)
PROT SERPL-MCNC: 7.7 G/DL — SIGNIFICANT CHANGE UP (ref 6–8.3)
RBC # BLD: 4.64 M/UL — SIGNIFICANT CHANGE UP (ref 3.8–5.2)
RBC # FLD: 14.3 % — SIGNIFICANT CHANGE UP (ref 10.3–14.5)
SODIUM SERPL-SCNC: 134 MMOL/L — LOW (ref 135–145)
WBC # BLD: 7.8 K/UL — SIGNIFICANT CHANGE UP (ref 3.8–10.5)
WBC # FLD AUTO: 7.8 K/UL — SIGNIFICANT CHANGE UP (ref 3.8–10.5)

## 2024-01-27 PROCEDURE — 74176 CT ABD & PELVIS W/O CONTRAST: CPT | Mod: 26,MA

## 2024-01-27 RX ORDER — METOCLOPRAMIDE HCL 10 MG
5 TABLET ORAL ONCE
Refills: 0 | Status: DISCONTINUED | OUTPATIENT
Start: 2024-01-27 | End: 2024-01-27

## 2024-01-27 RX ORDER — METOCLOPRAMIDE HCL 10 MG
10 TABLET ORAL ONCE
Refills: 0 | Status: COMPLETED | OUTPATIENT
Start: 2024-01-27 | End: 2024-01-27

## 2024-01-27 RX ORDER — ONDANSETRON 8 MG/1
4 TABLET, FILM COATED ORAL ONCE
Refills: 0 | Status: COMPLETED | OUTPATIENT
Start: 2024-01-27 | End: 2024-01-27

## 2024-01-27 RX ORDER — METOCLOPRAMIDE HCL 10 MG
10 TABLET ORAL ONCE
Refills: 0 | Status: DISCONTINUED | OUTPATIENT
Start: 2024-01-27 | End: 2024-01-27

## 2024-01-27 RX ORDER — POTASSIUM CHLORIDE 20 MEQ
20 PACKET (EA) ORAL
Refills: 0 | Status: COMPLETED | OUTPATIENT
Start: 2024-01-27 | End: 2024-01-27

## 2024-01-27 RX ORDER — SUCRALFATE 1 G
1 TABLET ORAL ONCE
Refills: 0 | Status: COMPLETED | OUTPATIENT
Start: 2024-01-27 | End: 2024-01-27

## 2024-01-27 RX ORDER — SODIUM CHLORIDE 9 MG/ML
1000 INJECTION INTRAMUSCULAR; INTRAVENOUS; SUBCUTANEOUS ONCE
Refills: 0 | Status: COMPLETED | OUTPATIENT
Start: 2024-01-27 | End: 2024-01-27

## 2024-01-27 RX ORDER — LANOLIN ALCOHOL/MO/W.PET/CERES
1 CREAM (GRAM) TOPICAL AT BEDTIME
Refills: 0 | Status: DISCONTINUED | OUTPATIENT
Start: 2024-01-27 | End: 2024-01-30

## 2024-01-27 RX ADMIN — Medication 1 MILLIGRAM(S): at 06:18

## 2024-01-27 RX ADMIN — Medication 50 MILLIEQUIVALENT(S): at 02:14

## 2024-01-27 RX ADMIN — ONDANSETRON 4 MILLIGRAM(S): 8 TABLET, FILM COATED ORAL at 00:27

## 2024-01-27 RX ADMIN — SODIUM CHLORIDE 1000 MILLILITER(S): 9 INJECTION INTRAMUSCULAR; INTRAVENOUS; SUBCUTANEOUS at 00:27

## 2024-01-27 RX ADMIN — Medication 20 MILLIEQUIVALENT(S): at 05:25

## 2024-01-27 RX ADMIN — Medication 10 MILLIGRAM(S): at 09:49

## 2024-01-27 RX ADMIN — Medication 50 MILLIEQUIVALENT(S): at 04:02

## 2024-01-27 RX ADMIN — Medication 1 GRAM(S): at 00:27

## 2024-01-27 RX ADMIN — SODIUM CHLORIDE 1000 MILLILITER(S): 9 INJECTION INTRAMUSCULAR; INTRAVENOUS; SUBCUTANEOUS at 02:52

## 2024-01-27 RX ADMIN — ONDANSETRON 4 MILLIGRAM(S): 8 TABLET, FILM COATED ORAL at 06:18

## 2024-01-27 RX ADMIN — Medication 1 GRAM(S): at 06:13

## 2024-01-27 RX ADMIN — Medication 50 MILLIEQUIVALENT(S): at 06:13

## 2024-01-27 NOTE — ED ADULT NURSE NOTE - NSFALLRISKINTERV_ED_ALL_ED
Assistance OOB with selected safe patient handling equipment if applicable/Assistance with ambulation/Communicate fall risk and risk factors to all staff, patient, and family/Monitor gait and stability/Provide visual cue: yellow wristband, yellow gown, etc/Reinforce activity limits and safety measures with patient and family/Call bell, personal items and telephone in reach/Instruct patient to call for assistance before getting out of bed/chair/stretcher/Non-slip footwear applied when patient is off stretcher/[place] to call system/Physically safe environment - no spills, clutter or unnecessary equipment/Purposeful Proactive Rounding/Room/bathroom lighting operational, light cord in reach

## 2024-01-27 NOTE — ED PROVIDER NOTE - NS ED ROS FT
CONSTITUTIONAL: no fevers  HEENT: no dysphagia  CV: no chest pain  RESP: no SOB  GI: + nausea/vomiting  : no dysuria  DERM: no rash  MSK: no back pain  NEURO: no LOC  ENDO: no diabetes

## 2024-01-27 NOTE — ED PROVIDER NOTE - NSFOLLOWUPINSTRUCTIONS_ED_ALL_ED_FT
You were seen in the emergency department for nausea and vomiting.  Please find your results attached to this document.  Fortunately, no signs of bowel obstruction.    Please continue taking all your medications at home.    Please follow-up with your GI doctor and your primary care doctor for further management.    Please return to the emergency department if you experience intractable vomiting, abdominal pain, dehydration, decreased urine output or any other concerns.

## 2024-01-27 NOTE — CONSULT NOTE ADULT - ATTENDING COMMENTS
Pt with nausea after stopping her Reglan for an upcoming gastric emptying study.  No acute surgical issues on imaging.  Will defer to GI for management of her Reglan and next steps.  Pt being admitted to Medicine.  Will sign off, pls call again if we can be of assistance.

## 2024-01-27 NOTE — CONSULT NOTE ADULT - ASSESSMENT
73F [facility] hiatal hernia repair 2001, toupet fundoplication 2023, now being evaluated for gastroparesis with plan for pyloric botox injections by GI. Presenting with intractable nausea and vomiting in setting of discontinuing reglan for outpatient study. Abdominal exam benign. CT unremarkable.    Patient should resume her reglan and follow up with GI for her pyloric botox injections as scheduled.  No surgical intervention, no further workup at this time.    Discussed with Dr. Galaviz.    [place] Team surgery [id number] 73F [facility] hiatal hernia repair 2001, toupet fundoplication 2023, now being evaluated for gastroparesis with plan for pyloric botox injections by GI. Presenting with intractable nausea and vomiting in setting of discontinuing reglan for outpatient study. Abdominal exam benign. CT unremarkable.    Patient should resume her reglan and follow up with GI for her pyloric botox injections as scheduled.  Recommend GI consult  No surgical intervention, no further workup at this time.    Discussed with Dr. Galaviz.    [place] Team surgery [id number] 73F [facility] hiatal hernia repair 2001, toupet fundoplication 2023, now being evaluated for gastroparesis with plan for pyloric botox injections by GI. Presenting with intractable nausea and vomiting in setting of discontinuing reglan for outpatient study. Abdominal exam benign. CT unremarkable.    Recommend GI consult  No surgical intervention, no further workup at this time.    Discussed with Dr. Galaviz.    [place] Team surgery [id number]

## 2024-01-27 NOTE — ED ADULT NURSE REASSESSMENT NOTE - NS ED NURSE REASSESS COMMENT FT1
Confirmed potassium order with MD Macdonald. 20 mEq potassium chloride not available in Pyxis. Spoke with pharmacist who states medication will be sent via tube. Awaiting medication arrival.

## 2024-01-27 NOTE — ED PROVIDER NOTE - CLINICAL SUMMARY MEDICAL DECISION MAKING FREE TEXT BOX
72 yo female hx of GERD, hiatal hernia repair, fundoplication, ?gastroparesis with outpatient study pending, had to d/c home reglan in preparation, now with N/V inability to tolerate PO.  nauseated here on exam.  check labs, IV zofran/carafate, surgery consult (admitted to their service 3 weeks ago), CT abdomen/pelvis and reassess.

## 2024-01-27 NOTE — ED ADULT NURSE NOTE - OBJECTIVE STATEMENT
PT is a 73 year old A&OX4 female with PMH of GERD, hiatal hernia, ulcerative esophagus, HTN, and HLD who presents to the ED from home with c/o nausea/vomiting and decreased PO intake. PT states she has not been eating since Wednesday and endorses nausea/vomiting all day today. PT denies chest pain, SOB, diarrhea, fevers, chills, pain, and urinary symptoms. PT states she has ongoing GI health issues and is followed closely by GI. PT is resting in bed, breathing unlabored on room air, and speaking in complete sentences in between dry heaving. Abdomen is soft, non-tender, and non-distended. Skin is warm and dry, no diaphoresis noted. No edema noted to B/L extremities. Strong strength in B/L extremities, sensation intact. IV access established 20G in left wrist. PT placed in hospital gown. Safety and comfort maintained. Family at the bedside.

## 2024-01-27 NOTE — ED ADULT NURSE REASSESSMENT NOTE - NS ED NURSE REASSESS COMMENT FT1
PT is resting comfortably in bed, breathing unlabored on room air, and speaking in complete sentences. Updated PT on plan of care. Awaiting medication from pharmacy. Safety and comfort maintained. Call bell within reach.

## 2024-01-27 NOTE — CONSULT NOTE ADULT - SUBJECTIVE AND OBJECTIVE BOX
GENERAL SURGERY CONSULT NOTE    Consulting surgical team: [facility]   Consulting attending: Dr. Galaviz    HPI:  73F [facility] hiatal hernia repair 2001, toupet fundoplication 11/30/23, now being evaluated for gastroparesis with plan for pyloric botox injections by GI. Patient was discharged on her reglan which she has been tolerating well. However she was scheduled for a nuclear medicine gastric emptying study and was told to stop her Reglan in anticipation of that study. Since stopping the medication on Wednesday night she has had intractable nausea and vomiting. Denies fevers, abdominal pain, is currently passing bowel movements. No other complaints.    PAST MEDICAL HISTORY:  H/O seasonal allergies    History of pulmonary embolism    Restless leg syndrome    GERD (gastroesophageal reflux disease)    HTN (hypertension)    OA (osteoarthritis)    Fungal infection of skin    H/O scoliosis    Essential hypertension    Depression    Osteoporosis    Right hip pain    2019 novel coronavirus disease (COVID-19)    Hiatal hernia    History of chronic pain    Peripheral neuropathy        PAST SURGICAL HISTORY:  S/P repair of paraesophageal hernia    S/P spinal fusion    Scoliosis    S/P spinal surgery    Removal of pin, plate, abigail, or screw    S/P hysterectomy    S/P hip replacement    S/P shoulder surgery    S/P dilatation and curettage    H/O arthroscopy of knee    H/O total knee replacement, right    S/P cataract surgery    History of bilateral hip replacements        MEDICATIONS:  melatonin 1 milliGRAM(s) Oral at bedtime      ALLERGIES:  penicillins (Urticaria)  erythromycin (Stomach Upset; Vomiting; Nausea)  Dilantin (Urticaria)      VITALS & I/Os:  Vital Signs Last 24 Hrs  T(C): 36.8 (27 Jan 2024 05:45), Max: 36.8 (27 Jan 2024 00:30)  T(F): 98.3 (27 Jan 2024 05:45), Max: 98.3 (27 Jan 2024 00:30)  HR: 89 (27 Jan 2024 05:45) (78 - 94)  BP: 134/96 (27 Jan 2024 05:45) (134/96 - 190/100)  BP(mean): 103 (27 Jan 2024 05:45) (103 - 108)  RR: 20 (27 Jan 2024 05:45) (18 - 20)  SpO2: 96% (27 Jan 2024 05:45) (94% - 98%)    Parameters below as of 27 Jan 2024 05:45  Patient On (Oxygen Delivery Method): room air        I&O's Summary      PHYSICAL EXAM:  General: well developed, well nourished, NAD  Neuro: alert and oriented, no focal deficits, moves all extremities spontaneously  HEENT: NCAT, EOMI, anicteric, mucosa moist  Respiratory: airway patent, respirations unlabored  CVS: regular rate and rhythm  Abdomen: soft, nontender, nondistended  Extremities: no edema, sensation and movement grossly intact  Skin: warm, dry, appropriate color      LABS:                        12.8   7.80  )-----------( 307      ( 27 Jan 2024 00:31 )             39.0     01-27    134<L>  |  92<L>  |  16  ----------------------------<  138<H>  2.9<LL>   |  25  |  0.61    Ca    10.1      27 Jan 2024 00:31    TPro  7.7  /  Alb  4.6  /  TBili  0.6  /  DBili  x   /  AST  23  /  ALT  28  /  AlkPhos  140<H>  01-27    Lactate:              Urinalysis Basic - ( 27 Jan 2024 00:31 )    Color: x / Appearance: x / SG: x / pH: x  Gluc: 138 mg/dL / Ketone: x  / Bili: x / Urobili: x   Blood: x / Protein: x / Nitrite: x   Leuk Esterase: x / RBC: x / WBC x   Sq Epi: x / Non Sq Epi: x / Bacteria: x        IMAGING:    c< from: CT Abdomen and Pelvis w/ Oral Cont (01.27.24 @ 05:01) >  FINDINGS:  LOWER CHEST: Coronary artery calcification    LIVER: Stable cysts.  BILE DUCTS: Normal caliber.  GALLBLADDER: Within normal limits.  SPLEEN: Within normal limits.  PANCREAS: Within normal limits.  ADRENALS: Within normal limits.  KIDNEYS/URETERS: Within normal limits.    BLADDER: Within normal limits.  REPRODUCTIVE ORGANS: Hysterectomy. Partially obscured by streak artifact.    BOWEL: No bowel obstruction. Appendix is normal.  PERITONEUM: No ascites.  VESSELS: Atherosclerotic changes.  RETROPERITONEUM/LYMPH NODES: No lymphadenopathy.  ABDOMINAL WALL: Within normal limits.  BONES: Degenerative changes. Bilateral sacroiliac joint arthrosis.   Ankylosis of the visualized thoracolumbar spine.    IMPRESSION:  No bowel obstruction or evidence of acute bowel inflammation.    < end of copied text >

## 2024-01-27 NOTE — ED PROVIDER NOTE - PROGRESS NOTE DETAILS
Sushma, PGY3: Patient signed out to my care. Surgery team currently endorsing reglan po and follow up with GI. Will give reglan in the ED and PO challenge. Patient status post Reglan 10 mg p.o. now tolerating food.  Patient reassessed after eating and found to be tolerating well.  Denies any nausea at this time.  Will discharge with follow-up and strict return precautions.  Patient has follow-up for this Tuesday for an endoscopy.

## 2024-01-27 NOTE — ED PROVIDER NOTE - PATIENT PORTAL LINK FT
You can access the FollowMyHealth Patient Portal offered by [facility] by registering at the following website: [email or web address]. By joining [employer]’s FollowMyHealth portal, you will also be able to view your health information using other applications (apps) compatible with our system.

## 2024-01-28 ENCOUNTER — INPATIENT (INPATIENT)
Facility: HOSPITAL | Age: 74
LOS: 1 days | Discharge: HOME | DRG: 392 | End: 2024-01-30
Attending: STUDENT IN AN ORGANIZED HEALTH CARE EDUCATION/TRAINING PROGRAM | Admitting: HOSPITALIST
Payer: MEDICARE

## 2024-01-28 VITALS
OXYGEN SATURATION: 94 % | WEIGHT: 184.97 LBS | SYSTOLIC BLOOD PRESSURE: 193 MMHG | TEMPERATURE: 99 F | RESPIRATION RATE: 20 BRPM | HEART RATE: 88 BPM | DIASTOLIC BLOOD PRESSURE: 79 MMHG | HEIGHT: 62 IN

## 2024-01-28 DIAGNOSIS — Z96.643 PRESENCE OF ARTIFICIAL HIP JOINT, BILATERAL: Chronic | ICD-10-CM

## 2024-01-28 DIAGNOSIS — Z98.890 OTHER SPECIFIED POSTPROCEDURAL STATES: Chronic | ICD-10-CM

## 2024-01-28 DIAGNOSIS — Z98.49 CATARACT EXTRACTION STATUS, UNSPECIFIED EYE: Chronic | ICD-10-CM

## 2024-01-28 PROCEDURE — 99285 EMERGENCY DEPT VISIT HI MDM: CPT | Mod: GC

## 2024-01-29 ENCOUNTER — TRANSCRIPTION ENCOUNTER (OUTPATIENT)
Age: 74
End: 2024-01-29

## 2024-01-29 ENCOUNTER — APPOINTMENT (OUTPATIENT)
Dept: NUCLEAR MEDICINE | Facility: HOSPITAL | Age: 74
End: 2024-01-29

## 2024-01-29 DIAGNOSIS — K31.84 GASTROPARESIS: ICD-10-CM

## 2024-01-29 DIAGNOSIS — E78.5 HYPERLIPIDEMIA, UNSPECIFIED: ICD-10-CM

## 2024-01-29 DIAGNOSIS — K21.9 GASTRO-ESOPHAGEAL REFLUX DISEASE WITHOUT ESOPHAGITIS: ICD-10-CM

## 2024-01-29 DIAGNOSIS — E87.6 HYPOKALEMIA: ICD-10-CM

## 2024-01-29 DIAGNOSIS — R11.2 NAUSEA WITH VOMITING, UNSPECIFIED: ICD-10-CM

## 2024-01-29 DIAGNOSIS — Z86.69 PERSONAL HISTORY OF OTHER DISEASES OF THE NERVOUS SYSTEM AND SENSE ORGANS: ICD-10-CM

## 2024-01-29 DIAGNOSIS — I10 ESSENTIAL (PRIMARY) HYPERTENSION: ICD-10-CM

## 2024-01-29 LAB
A1C WITH ESTIMATED AVERAGE GLUCOSE RESULT: 6.1 % — HIGH (ref 4–5.6)
ALBUMIN SERPL ELPH-MCNC: 4.4 G/DL — SIGNIFICANT CHANGE UP (ref 3.3–5)
ALP SERPL-CCNC: 136 U/L — HIGH (ref 40–120)
ALT FLD-CCNC: 24 U/L — SIGNIFICANT CHANGE UP (ref 10–45)
ANION GAP SERPL CALC-SCNC: 10 MMOL/L — SIGNIFICANT CHANGE UP (ref 5–17)
ANION GAP SERPL CALC-SCNC: 13 MMOL/L — SIGNIFICANT CHANGE UP (ref 5–17)
ANION GAP SERPL CALC-SCNC: 9 MMOL/L — SIGNIFICANT CHANGE UP (ref 5–17)
APPEARANCE UR: CLEAR — SIGNIFICANT CHANGE UP
AST SERPL-CCNC: 20 U/L — SIGNIFICANT CHANGE UP (ref 10–40)
BACTERIA # UR AUTO: NEGATIVE /HPF — SIGNIFICANT CHANGE UP
BASE EXCESS BLDV CALC-SCNC: 6.6 MMOL/L — HIGH (ref -2–3)
BASOPHILS # BLD AUTO: 0.03 K/UL — SIGNIFICANT CHANGE UP (ref 0–0.2)
BASOPHILS NFR BLD AUTO: 0.5 % — SIGNIFICANT CHANGE UP (ref 0–2)
BILIRUB SERPL-MCNC: 0.5 MG/DL — SIGNIFICANT CHANGE UP (ref 0.2–1.2)
BILIRUB UR-MCNC: NEGATIVE — SIGNIFICANT CHANGE UP
BUN SERPL-MCNC: 10 MG/DL — SIGNIFICANT CHANGE UP (ref 7–23)
BUN SERPL-MCNC: 13 MG/DL — SIGNIFICANT CHANGE UP (ref 7–23)
BUN SERPL-MCNC: 18 MG/DL — SIGNIFICANT CHANGE UP (ref 7–23)
CA-I SERPL-SCNC: 1.25 MMOL/L — SIGNIFICANT CHANGE UP (ref 1.15–1.33)
CALCIUM SERPL-MCNC: 10.1 MG/DL — SIGNIFICANT CHANGE UP (ref 8.4–10.5)
CALCIUM SERPL-MCNC: 10.5 MG/DL — SIGNIFICANT CHANGE UP (ref 8.4–10.5)
CALCIUM SERPL-MCNC: 8.9 MG/DL — SIGNIFICANT CHANGE UP (ref 8.4–10.5)
CAST: 0 /LPF — SIGNIFICANT CHANGE UP (ref 0–4)
CHLORIDE BLDV-SCNC: 97 MMOL/L — SIGNIFICANT CHANGE UP (ref 96–108)
CHLORIDE SERPL-SCNC: 102 MMOL/L — SIGNIFICANT CHANGE UP (ref 96–108)
CHLORIDE SERPL-SCNC: 103 MMOL/L — SIGNIFICANT CHANGE UP (ref 96–108)
CHLORIDE SERPL-SCNC: 97 MMOL/L — SIGNIFICANT CHANGE UP (ref 96–108)
CO2 BLDV-SCNC: 33 MMOL/L — HIGH (ref 22–26)
CO2 SERPL-SCNC: 27 MMOL/L — SIGNIFICANT CHANGE UP (ref 22–31)
CO2 SERPL-SCNC: 27 MMOL/L — SIGNIFICANT CHANGE UP (ref 22–31)
CO2 SERPL-SCNC: 31 MMOL/L — SIGNIFICANT CHANGE UP (ref 22–31)
COLOR SPEC: YELLOW — SIGNIFICANT CHANGE UP
CREAT SERPL-MCNC: 0.68 MG/DL — SIGNIFICANT CHANGE UP (ref 0.5–1.3)
CREAT SERPL-MCNC: 0.7 MG/DL — SIGNIFICANT CHANGE UP (ref 0.5–1.3)
CREAT SERPL-MCNC: 0.73 MG/DL — SIGNIFICANT CHANGE UP (ref 0.5–1.3)
DIFF PNL FLD: NEGATIVE — SIGNIFICANT CHANGE UP
EGFR: 86 ML/MIN/1.73M2 — SIGNIFICANT CHANGE UP
EGFR: 90 ML/MIN/1.73M2 — SIGNIFICANT CHANGE UP
EGFR: 91 ML/MIN/1.73M2 — SIGNIFICANT CHANGE UP
EOSINOPHIL # BLD AUTO: 0 K/UL — SIGNIFICANT CHANGE UP (ref 0–0.5)
EOSINOPHIL NFR BLD AUTO: 0 % — SIGNIFICANT CHANGE UP (ref 0–6)
ESTIMATED AVERAGE GLUCOSE: 128 MG/DL — HIGH (ref 68–114)
GAS PNL BLDV: 133 MMOL/L — LOW (ref 136–145)
GAS PNL BLDV: SIGNIFICANT CHANGE UP
GLUCOSE BLDV-MCNC: 117 MG/DL — HIGH (ref 70–99)
GLUCOSE SERPL-MCNC: 101 MG/DL — HIGH (ref 70–99)
GLUCOSE SERPL-MCNC: 108 MG/DL — HIGH (ref 70–99)
GLUCOSE SERPL-MCNC: 121 MG/DL — HIGH (ref 70–99)
GLUCOSE UR QL: NEGATIVE MG/DL — SIGNIFICANT CHANGE UP
HCO3 BLDV-SCNC: 31 MMOL/L — HIGH (ref 22–29)
HCT VFR BLD CALC: 36.7 % — SIGNIFICANT CHANGE UP (ref 34.5–45)
HCT VFR BLD CALC: 39.8 % — SIGNIFICANT CHANGE UP (ref 34.5–45)
HCT VFR BLDA CALC: 38 % — SIGNIFICANT CHANGE UP (ref 34.5–46.5)
HGB BLD CALC-MCNC: 12.5 G/DL — SIGNIFICANT CHANGE UP (ref 11.7–16.1)
HGB BLD-MCNC: 11.8 G/DL — SIGNIFICANT CHANGE UP (ref 11.5–15.5)
HGB BLD-MCNC: 12.7 G/DL — SIGNIFICANT CHANGE UP (ref 11.5–15.5)
IMM GRANULOCYTES NFR BLD AUTO: 0.5 % — SIGNIFICANT CHANGE UP (ref 0–0.9)
KETONES UR-MCNC: NEGATIVE MG/DL — SIGNIFICANT CHANGE UP
LACTATE BLDV-MCNC: 1.4 MMOL/L — SIGNIFICANT CHANGE UP (ref 0.5–2)
LEUKOCYTE ESTERASE UR-ACNC: NEGATIVE — SIGNIFICANT CHANGE UP
LIDOCAIN IGE QN: 24 U/L — SIGNIFICANT CHANGE UP (ref 7–60)
LYMPHOCYTES # BLD AUTO: 1.01 K/UL — SIGNIFICANT CHANGE UP (ref 1–3.3)
LYMPHOCYTES # BLD AUTO: 16.5 % — SIGNIFICANT CHANGE UP (ref 13–44)
MAGNESIUM SERPL-MCNC: 1.8 MG/DL — SIGNIFICANT CHANGE UP (ref 1.6–2.6)
MCHC RBC-ENTMCNC: 27.3 PG — SIGNIFICANT CHANGE UP (ref 27–34)
MCHC RBC-ENTMCNC: 27.3 PG — SIGNIFICANT CHANGE UP (ref 27–34)
MCHC RBC-ENTMCNC: 31.9 GM/DL — LOW (ref 32–36)
MCHC RBC-ENTMCNC: 32.2 GM/DL — SIGNIFICANT CHANGE UP (ref 32–36)
MCV RBC AUTO: 85 FL — SIGNIFICANT CHANGE UP (ref 80–100)
MCV RBC AUTO: 85.4 FL — SIGNIFICANT CHANGE UP (ref 80–100)
MONOCYTES # BLD AUTO: 0.19 K/UL — SIGNIFICANT CHANGE UP (ref 0–0.9)
MONOCYTES NFR BLD AUTO: 3.1 % — SIGNIFICANT CHANGE UP (ref 2–14)
NEUTROPHILS # BLD AUTO: 4.85 K/UL — SIGNIFICANT CHANGE UP (ref 1.8–7.4)
NEUTROPHILS NFR BLD AUTO: 79.4 % — HIGH (ref 43–77)
NITRITE UR-MCNC: NEGATIVE — SIGNIFICANT CHANGE UP
NRBC # BLD: 0 /100 WBCS — SIGNIFICANT CHANGE UP (ref 0–0)
NRBC # BLD: 0 /100 WBCS — SIGNIFICANT CHANGE UP (ref 0–0)
PCO2 BLDV: 44 MMHG — HIGH (ref 39–42)
PH BLDV: 7.46 — HIGH (ref 7.32–7.43)
PH UR: 7 — SIGNIFICANT CHANGE UP (ref 5–8)
PHOSPHATE SERPL-MCNC: 3.1 MG/DL — SIGNIFICANT CHANGE UP (ref 2.5–4.5)
PLATELET # BLD AUTO: 284 K/UL — SIGNIFICANT CHANGE UP (ref 150–400)
PLATELET # BLD AUTO: 308 K/UL — SIGNIFICANT CHANGE UP (ref 150–400)
PO2 BLDV: 40 MMHG — SIGNIFICANT CHANGE UP (ref 25–45)
POTASSIUM BLDV-SCNC: 3 MMOL/L — LOW (ref 3.5–5.1)
POTASSIUM SERPL-MCNC: 2.9 MMOL/L — CRITICAL LOW (ref 3.5–5.3)
POTASSIUM SERPL-MCNC: 2.9 MMOL/L — CRITICAL LOW (ref 3.5–5.3)
POTASSIUM SERPL-MCNC: 4.4 MMOL/L — SIGNIFICANT CHANGE UP (ref 3.5–5.3)
POTASSIUM SERPL-SCNC: 2.9 MMOL/L — CRITICAL LOW (ref 3.5–5.3)
POTASSIUM SERPL-SCNC: 2.9 MMOL/L — CRITICAL LOW (ref 3.5–5.3)
POTASSIUM SERPL-SCNC: 4.4 MMOL/L — SIGNIFICANT CHANGE UP (ref 3.5–5.3)
PROCALCITONIN SERPL-MCNC: 0.06 NG/ML — SIGNIFICANT CHANGE UP (ref 0.02–0.1)
PROT SERPL-MCNC: 7.3 G/DL — SIGNIFICANT CHANGE UP (ref 6–8.3)
PROT UR-MCNC: NEGATIVE MG/DL — SIGNIFICANT CHANGE UP
RBC # BLD: 4.32 M/UL — SIGNIFICANT CHANGE UP (ref 3.8–5.2)
RBC # BLD: 4.66 M/UL — SIGNIFICANT CHANGE UP (ref 3.8–5.2)
RBC # FLD: 14.5 % — SIGNIFICANT CHANGE UP (ref 10.3–14.5)
RBC # FLD: 14.5 % — SIGNIFICANT CHANGE UP (ref 10.3–14.5)
RBC CASTS # UR COMP ASSIST: 1 /HPF — SIGNIFICANT CHANGE UP (ref 0–4)
SAO2 % BLDV: 65.8 % — LOW (ref 67–88)
SODIUM SERPL-SCNC: 138 MMOL/L — SIGNIFICANT CHANGE UP (ref 135–145)
SODIUM SERPL-SCNC: 139 MMOL/L — SIGNIFICANT CHANGE UP (ref 135–145)
SODIUM SERPL-SCNC: 142 MMOL/L — SIGNIFICANT CHANGE UP (ref 135–145)
SP GR SPEC: 1.01 — SIGNIFICANT CHANGE UP (ref 1–1.03)
SQUAMOUS # UR AUTO: 1 /HPF — SIGNIFICANT CHANGE UP (ref 0–5)
UROBILINOGEN FLD QL: 0.2 MG/DL — SIGNIFICANT CHANGE UP (ref 0.2–1)
WBC # BLD: 5.33 K/UL — SIGNIFICANT CHANGE UP (ref 3.8–10.5)
WBC # BLD: 6.11 K/UL — SIGNIFICANT CHANGE UP (ref 3.8–10.5)
WBC # FLD AUTO: 5.33 K/UL — SIGNIFICANT CHANGE UP (ref 3.8–10.5)
WBC # FLD AUTO: 6.11 K/UL — SIGNIFICANT CHANGE UP (ref 3.8–10.5)
WBC UR QL: 0 /HPF — SIGNIFICANT CHANGE UP (ref 0–5)

## 2024-01-29 PROCEDURE — 87086 URINE CULTURE/COLONY COUNT: CPT

## 2024-01-29 PROCEDURE — 80048 BASIC METABOLIC PNL TOTAL CA: CPT

## 2024-01-29 PROCEDURE — 82330 ASSAY OF CALCIUM: CPT

## 2024-01-29 PROCEDURE — 80053 COMPREHEN METABOLIC PANEL: CPT

## 2024-01-29 PROCEDURE — 96374 THER/PROPH/DIAG INJ IV PUSH: CPT

## 2024-01-29 PROCEDURE — 74176 CT ABD & PELVIS W/O CONTRAST: CPT | Mod: MA

## 2024-01-29 PROCEDURE — 81001 URINALYSIS AUTO W/SCOPE: CPT

## 2024-01-29 PROCEDURE — 83735 ASSAY OF MAGNESIUM: CPT

## 2024-01-29 PROCEDURE — 85014 HEMATOCRIT: CPT

## 2024-01-29 PROCEDURE — 96361 HYDRATE IV INFUSION ADD-ON: CPT

## 2024-01-29 PROCEDURE — 99284 EMERGENCY DEPT VISIT MOD MDM: CPT | Mod: 25

## 2024-01-29 PROCEDURE — 84100 ASSAY OF PHOSPHORUS: CPT

## 2024-01-29 PROCEDURE — 82947 ASSAY GLUCOSE BLOOD QUANT: CPT

## 2024-01-29 PROCEDURE — 99221 1ST HOSP IP/OBS SF/LOW 40: CPT | Mod: 24

## 2024-01-29 PROCEDURE — 85027 COMPLETE CBC AUTOMATED: CPT

## 2024-01-29 PROCEDURE — 82803 BLOOD GASES ANY COMBINATION: CPT

## 2024-01-29 PROCEDURE — 96375 TX/PRO/DX INJ NEW DRUG ADDON: CPT

## 2024-01-29 PROCEDURE — 71046 X-RAY EXAM CHEST 2 VIEWS: CPT | Mod: 26

## 2024-01-29 PROCEDURE — 84145 PROCALCITONIN (PCT): CPT

## 2024-01-29 PROCEDURE — 83690 ASSAY OF LIPASE: CPT

## 2024-01-29 PROCEDURE — 99223 1ST HOSP IP/OBS HIGH 75: CPT

## 2024-01-29 PROCEDURE — 99285 EMERGENCY DEPT VISIT HI MDM: CPT | Mod: 25

## 2024-01-29 PROCEDURE — 84132 ASSAY OF SERUM POTASSIUM: CPT

## 2024-01-29 PROCEDURE — 84295 ASSAY OF SERUM SODIUM: CPT

## 2024-01-29 PROCEDURE — 83605 ASSAY OF LACTIC ACID: CPT

## 2024-01-29 PROCEDURE — 99222 1ST HOSP IP/OBS MODERATE 55: CPT | Mod: GC

## 2024-01-29 PROCEDURE — 85018 HEMOGLOBIN: CPT

## 2024-01-29 PROCEDURE — 96376 TX/PRO/DX INJ SAME DRUG ADON: CPT

## 2024-01-29 PROCEDURE — 85025 COMPLETE CBC W/AUTO DIFF WBC: CPT

## 2024-01-29 PROCEDURE — 82435 ASSAY OF BLOOD CHLORIDE: CPT

## 2024-01-29 PROCEDURE — 83036 HEMOGLOBIN GLYCOSYLATED A1C: CPT

## 2024-01-29 PROCEDURE — 36415 COLL VENOUS BLD VENIPUNCTURE: CPT

## 2024-01-29 PROCEDURE — 71046 X-RAY EXAM CHEST 2 VIEWS: CPT

## 2024-01-29 RX ORDER — CHLORTHALIDONE 50 MG
25 TABLET ORAL DAILY
Refills: 0 | Status: DISCONTINUED | OUTPATIENT
Start: 2024-01-29 | End: 2024-01-29

## 2024-01-29 RX ORDER — METOCLOPRAMIDE HCL 10 MG
5 TABLET ORAL EVERY 6 HOURS
Refills: 0 | Status: DISCONTINUED | OUTPATIENT
Start: 2024-01-29 | End: 2024-01-30

## 2024-01-29 RX ORDER — SODIUM CHLORIDE 9 MG/ML
1000 INJECTION, SOLUTION INTRAVENOUS
Refills: 0 | Status: DISCONTINUED | OUTPATIENT
Start: 2024-01-29 | End: 2024-01-29

## 2024-01-29 RX ORDER — POTASSIUM CHLORIDE 20 MEQ
10 PACKET (EA) ORAL
Refills: 0 | Status: COMPLETED | OUTPATIENT
Start: 2024-01-29 | End: 2024-01-29

## 2024-01-29 RX ORDER — ATORVASTATIN CALCIUM 80 MG/1
10 TABLET, FILM COATED ORAL AT BEDTIME
Refills: 0 | Status: DISCONTINUED | OUTPATIENT
Start: 2024-01-29 | End: 2024-01-30

## 2024-01-29 RX ORDER — SUCRALFATE 1 G
1 TABLET ORAL EVERY 6 HOURS
Refills: 0 | Status: DISCONTINUED | OUTPATIENT
Start: 2024-01-29 | End: 2024-01-30

## 2024-01-29 RX ORDER — HYDRALAZINE HCL 50 MG
10 TABLET ORAL EVERY 6 HOURS
Refills: 0 | Status: DISCONTINUED | OUTPATIENT
Start: 2024-01-29 | End: 2024-01-30

## 2024-01-29 RX ORDER — SUCRALFATE 1 G
1 TABLET ORAL ONCE
Refills: 0 | Status: COMPLETED | OUTPATIENT
Start: 2024-01-29 | End: 2024-01-29

## 2024-01-29 RX ORDER — FOLIC ACID 0.8 MG
1 TABLET ORAL ONCE
Refills: 0 | Status: COMPLETED | OUTPATIENT
Start: 2024-01-29 | End: 2024-01-29

## 2024-01-29 RX ORDER — FAMOTIDINE 10 MG/ML
20 INJECTION INTRAVENOUS ONCE
Refills: 0 | Status: COMPLETED | OUTPATIENT
Start: 2024-01-29 | End: 2024-01-29

## 2024-01-29 RX ORDER — CHLORTHALIDONE 50 MG
25 TABLET ORAL DAILY
Refills: 0 | Status: DISCONTINUED | OUTPATIENT
Start: 2024-01-29 | End: 2024-01-30

## 2024-01-29 RX ORDER — SODIUM CHLORIDE 9 MG/ML
1000 INJECTION, SOLUTION INTRAVENOUS ONCE
Refills: 0 | Status: COMPLETED | OUTPATIENT
Start: 2024-01-29 | End: 2024-01-29

## 2024-01-29 RX ORDER — PRAMIPEXOLE DIHYDROCHLORIDE 0.12 MG/1
1 TABLET ORAL
Refills: 0 | DISCHARGE

## 2024-01-29 RX ORDER — SODIUM CHLORIDE 9 MG/ML
1000 INJECTION, SOLUTION INTRAVENOUS
Refills: 0 | Status: DISCONTINUED | OUTPATIENT
Start: 2024-01-29 | End: 2024-01-30

## 2024-01-29 RX ORDER — POTASSIUM CHLORIDE 20 MEQ
20 PACKET (EA) ORAL
Refills: 0 | Status: COMPLETED | OUTPATIENT
Start: 2024-01-29 | End: 2024-01-29

## 2024-01-29 RX ORDER — MAGNESIUM SULFATE 500 MG/ML
2 VIAL (ML) INJECTION ONCE
Refills: 0 | Status: COMPLETED | OUTPATIENT
Start: 2024-01-29 | End: 2024-01-29

## 2024-01-29 RX ORDER — HYDRALAZINE HCL 50 MG
10 TABLET ORAL ONCE
Refills: 0 | Status: COMPLETED | OUTPATIENT
Start: 2024-01-29 | End: 2024-01-29

## 2024-01-29 RX ORDER — CHLORTHALIDONE 50 MG
1 TABLET ORAL
Refills: 0 | DISCHARGE

## 2024-01-29 RX ORDER — PRAMIPEXOLE DIHYDROCHLORIDE 0.12 MG/1
3.75 TABLET ORAL AT BEDTIME
Refills: 0 | Status: DISCONTINUED | OUTPATIENT
Start: 2024-01-29 | End: 2024-01-30

## 2024-01-29 RX ORDER — THIAMINE MONONITRATE (VIT B1) 100 MG
100 TABLET ORAL ONCE
Refills: 0 | Status: COMPLETED | OUTPATIENT
Start: 2024-01-29 | End: 2024-01-29

## 2024-01-29 RX ADMIN — ATORVASTATIN CALCIUM 10 MILLIGRAM(S): 80 TABLET, FILM COATED ORAL at 21:26

## 2024-01-29 RX ADMIN — FAMOTIDINE 20 MILLIGRAM(S): 10 INJECTION INTRAVENOUS at 00:49

## 2024-01-29 RX ADMIN — Medication 10 MILLIGRAM(S): at 07:11

## 2024-01-29 RX ADMIN — Medication 100 MILLIEQUIVALENT(S): at 03:26

## 2024-01-29 RX ADMIN — SODIUM CHLORIDE 150 MILLILITER(S): 9 INJECTION, SOLUTION INTRAVENOUS at 02:02

## 2024-01-29 RX ADMIN — Medication 100 MILLIEQUIVALENT(S): at 02:02

## 2024-01-29 RX ADMIN — SODIUM CHLORIDE 100 MILLILITER(S): 9 INJECTION, SOLUTION INTRAVENOUS at 21:26

## 2024-01-29 RX ADMIN — Medication 100 MILLIGRAM(S): at 01:06

## 2024-01-29 RX ADMIN — Medication 1 GRAM(S): at 12:39

## 2024-01-29 RX ADMIN — Medication 1 GRAM(S): at 19:39

## 2024-01-29 RX ADMIN — Medication 1 MILLIGRAM(S): at 00:49

## 2024-01-29 RX ADMIN — Medication 1 GRAM(S): at 00:50

## 2024-01-29 RX ADMIN — Medication 5 MILLIGRAM(S): at 07:10

## 2024-01-29 RX ADMIN — SODIUM CHLORIDE 100 MILLILITER(S): 9 INJECTION, SOLUTION INTRAVENOUS at 06:58

## 2024-01-29 RX ADMIN — Medication 50 MILLIEQUIVALENT(S): at 15:15

## 2024-01-29 RX ADMIN — Medication 100 GRAM(S): at 00:50

## 2024-01-29 RX ADMIN — Medication 50 MILLIEQUIVALENT(S): at 10:04

## 2024-01-29 RX ADMIN — SODIUM CHLORIDE 2000 MILLILITER(S): 9 INJECTION, SOLUTION INTRAVENOUS at 00:50

## 2024-01-29 RX ADMIN — PRAMIPEXOLE DIHYDROCHLORIDE 1.5 MILLIGRAM(S): 0.12 TABLET ORAL at 09:21

## 2024-01-29 RX ADMIN — Medication 100 MILLIEQUIVALENT(S): at 04:38

## 2024-01-29 RX ADMIN — Medication 50 MILLIEQUIVALENT(S): at 12:38

## 2024-01-29 NOTE — DISCHARGE NOTE PROVIDER - NSDCCPCAREPLAN_GEN_ALL_CORE_FT
PRINCIPAL DISCHARGE DIAGNOSIS  Diagnosis: Nausea and vomiting  Assessment and Plan of Treatment: outpatient appointment with GI Dr. Bernstein ; planned for EGD +/- pyloric injection tomorrow 1/30  - seen by GI in ED ; reccs to restart  Reglan 5 mg TID before meals        SECONDARY DISCHARGE DIAGNOSES  Diagnosis: Moderate dehydration  Assessment and Plan of Treatment: you were given IV fluids, follow up with GI and PCP 1/30    Diagnosis: Hypokalemia  Assessment and Plan of Treatment: supplemented

## 2024-01-29 NOTE — PROGRESS NOTE ADULT - SUBJECTIVE AND OBJECTIVE BOX
Patient is a 73y old  Female who presents with a chief complaint of Intractable nausea and vomiting, gastroparesis (29 Jan 2024 08:59)      SUBJECTIVE / OVERNIGHT EVENTS:  Pt seen and examined. No acute events overnight. She reports mild improvement in n/v. Denies abd pain, fever/chills.    MEDICATIONS  (STANDING):  atorvastatin 10 milliGRAM(s) Oral at bedtime  hydrochlorothiazide 25 milliGRAM(s) Oral daily  lactated ringers. 1000 milliLiter(s) (100 mL/Hr) IV Continuous <Continuous>  potassium chloride  20 mEq/100 mL IVPB 20 milliEquivalent(s) IV Intermittent every 2 hours  pramipexole 3.75 milliGRAM(s) Oral at bedtime  sucralfate suspension 1 Gram(s) Oral every 6 hours    MEDICATIONS  (PRN):  metoclopramide Injectable 5 milliGRAM(s) IV Push every 6 hours PRN Nausea      Vital Signs Last 24 Hrs  T(C): 36.2 (29 Jan 2024 11:30), Max: 37 (28 Jan 2024 20:43)  T(F): 97.1 (29 Jan 2024 11:30), Max: 98.6 (28 Jan 2024 20:43)  HR: 84 (29 Jan 2024 11:30) (76 - 89)  BP: 171/76 (29 Jan 2024 11:30) (139/63 - 193/79)  BP(mean): --  RR: 18 (29 Jan 2024 11:30) (18 - 20)  SpO2: 98% (29 Jan 2024 11:30) (94% - 98%)    Parameters below as of 29 Jan 2024 11:30  Patient On (Oxygen Delivery Method): room air      CAPILLARY BLOOD GLUCOSE        I&O's Summary      PHYSICAL EXAM:  GENERAL: NAD, well-groomed  HEAD:  Atraumatic, Normocephalic  EYES:  conjunctiva and sclera clear  NECK: Supple, No JVD  CHEST/LUNG: Clear to auscultation bilaterally; No wheeze  HEART: Regular rate and rhythm; No murmurs, rubs, or gallops  ABDOMEN: Soft, Nontender, Nondistended; Bowel sounds present  EXTREMITIES:  2+ Peripheral Pulses, No clubbing, cyanosis, or edema  PSYCH: AAOx3  NEUROLOGY: non-focal  SKIN: No rashes or lesions    LABS:                        11.8   5.33  )-----------( 284      ( 29 Jan 2024 09:08 )             36.7     01-29    142  |  102  |  13  ----------------------------<  101<H>  2.9<LL>   |  27  |  0.73    Ca    10.1      29 Jan 2024 09:08  Phos  3.1     01-29  Mg     1.8     01-29    TPro  7.3  /  Alb  4.4  /  TBili  0.5  /  DBili  x   /  AST  20  /  ALT  24  /  AlkPhos  136<H>  01-29          Urinalysis Basic - ( 29 Jan 2024 09:08 )    Color: x / Appearance: x / SG: x / pH: x  Gluc: 101 mg/dL / Ketone: x  / Bili: x / Urobili: x   Blood: x / Protein: x / Nitrite: x   Leuk Esterase: x / RBC: x / WBC x   Sq Epi: x / Non Sq Epi: x / Bacteria: x          Consultant(s) Notes Reviewed:  GI

## 2024-01-29 NOTE — H&P ADULT - PROBLEM SELECTOR PLAN 1
GI Fellow made aware by ED, coordinating care for patient to receive procedure as scheduled on 1/30/24, please follow up.   - Delayed gastric emptying on nuclear study on 1/3/24  - CT Abdomen: Negative for obstruction  - NPO  - LR@100 while NPO  - Cw reglan 5mg q6h PRN nausea  - Cw sucralfate  - Surgery consulted by ED, will not intervene  - GI following

## 2024-01-29 NOTE — ED ADULT NURSE REASSESSMENT NOTE - NS ED NURSE REASSESS COMMENT FT1
Pt made aware that she will being going to ED [place]. Pt's v/s stable at this time. Pt appears comfortable. Pt denies pain or nausea at this time. Plan of care and monitoring explained to pt. Bed locked and lowered. Report given to Gabrielle DIAS and pt transported to ED [place].
Pt was assisted to the bathroom via walker. Pt appears comfortable and is not in any distress. Pt denies pain, nausea, SOB or chest pain at this time. Plan of care and monitoring explained to the patient. Bed lowered and locked for safety.
Pt is stable at this time and expresses no pain, nausea, SOB or chest palpitations at this time. Plan of care and monitoring explained to patient. Bed locked and lowered for safety.
V/s performed on patient and within normal limits. Pt appears comfortable and denies pain, nausea, SOB or chest pain at this time. Pt made aware of plan of care and monitoring. Bed locked and lowered.
Pt was taken to the bathroom via wheelchair. Pt was repositioned back into bed for comfort. Pt made aware of plan of care and monitoring. Bed locked and lowered for safety. LIZZY Estrada contacted as per pt's request for Pramipexole and said it was ok to give.
Report received from Van DIAS. Pt appears comfortable and states "I feel much better than I did last night". Pt is however saying that she is starting to feel tingling in her extremities from her Restless leg syndrome, and is asking for her home medication Pramipexole. Admitting Provider will be made aware. Pt was repositioned for comfort. Pt's breathing was spontaneous and unlabored, skin pink and dry. V/S as documented. Plan of care and monitoring explained to the patient. Bed locked and lowered.

## 2024-01-29 NOTE — PROGRESS NOTE ADULT - PROBLEM SELECTOR PLAN 1
- follows outpatient w/GI Dr. Bernstein ; planned for EGD +/- pyloric injection tomorrow 1/30  - seen by GI in ED ; reccs to restart  Reglan 5 mg TID before meals  - c/w IVF and K supplementation   - c/w clear liquid diet   - plan to d/c in AM for scheduled OP EGD+/- botox injection

## 2024-01-29 NOTE — PROGRESS NOTE ADULT - PROBLEM SELECTOR PLAN 2
- K of 2.9 on presentation  - s/p  IV repletion  - repeat K 2.9  - will give another round of IV KCL 20meq q 2h x 3 doses  - repeat BMP thereafter

## 2024-01-29 NOTE — DISCHARGE NOTE PROVIDER - CONDITIONS AT DISCHARGE
As per attending pt is stable for discharge and will travel to [facility] for planned injections

## 2024-01-29 NOTE — ASU PATIENT PROFILE, ADULT - NSICDXPASTSURGICALHX_GEN_ALL_CORE_FT
PAST SURGICAL HISTORY:  H/O arthroscopy of knee June 2021    History of bilateral hip replacements     Removal of pin, plate, abigail, or screw 1991- removal of billings abigail    S/P cataract surgery     S/P dilatation and curettage 1981    S/P hip replacement left (2008)    S/P hysterectomy 1982    S/P repair of paraesophageal hernia 2001    S/P shoulder surgery right (2012)    S/P spinal fusion L4-L5 1966    S/P spinal surgery x 2 1985, 1986    Scoliosis s/p placement of billings aibgail x 2 1984

## 2024-01-29 NOTE — CONSULT NOTE ADULT - ATTENDING COMMENTS
As above.    Patient seen and examined on 1/29/2024 in the afternoon.    Impression:    #1.  Gastroparesis    #2.  Recurrent nausea and vomiting due to problem #1    Recommendations:    #1.  Clear liquid diet and n.p.o. past midnight on 1/29/2024 for upper endoscopy with Botox injection to the pylorus on 1/30/2024 at [facility] at 11 AM    #2.  Aspiration precautions    #3.  Reglan as needed for nausea vomiting
I saw and examined the patient, and reviewed  the history with the patient and   Agree with note which was also reviewed and edited where appropriate.  D/W patient, RN, residents and Fellow  spoke with rakel JORDAN pyloroplasty spoke with Dr Garcia and Dr Aleman

## 2024-01-29 NOTE — H&P ADULT - NSHPPHYSICALEXAM_GEN_ALL_CORE
T(C): 36.8 (01-29-24 @ 04:30), Max: 37 (01-28-24 @ 20:43)  HR: 89 (01-29-24 @ 04:30) (88 - 89)  BP: 180/74 (01-29-24 @ 04:30) (180/74 - 193/79)  RR: 18 (01-29-24 @ 04:30) (18 - 20)  SpO2: 96% (01-29-24 @ 04:30) (94% - 96%)    CONSTITUTIONAL: No apparent distress  EYES: PERRLA and symmetric, EOMI, No conjunctival or scleral injection, non-icteric  ENMT: Oral mucosa with moist membranes.  NECK: Supple, symmetric. No JVD.  RESP: No respiratory distress, no use of accessory muscles; CTA b/l, no WRR  CV: RRR, +S1S2, no MRG  GI: Soft, NT, ND, no rebound, no guarding  : No suprapubic tenderness. No CVA tenderness.  LYMPH: No cervical LAD or tenderness  MSK: No spinal tenderness, normal muscle strength/tone  EXTREMITIES: No pedal edema  SKIN: No rashes or ulcers noted  NEURO: A+Ox3, responsive. No tremor, sensation intact in upper and lower extremities b/l  PSYCH: Appropriate insight/judgment; mood and affect appropriate, recent/remote memory intact

## 2024-01-29 NOTE — ED ADULT NURSE NOTE - NSFALLRISKINTERV_ED_ALL_ED

## 2024-01-29 NOTE — ED ADULT NURSE REASSESSMENT NOTE - GENERAL PATIENT STATE
comfortable appearance/cooperative
anxious
comfortable appearance/cooperative/smiling/interactive
comfortable appearance

## 2024-01-29 NOTE — ED PROVIDER NOTE - OBJECTIVE STATEMENT
73-year-old female history of hypertension, hyperlipidemia, GERD, hiatal hernia repair x 2, fundoplication, under investigation for gastroparesis with plan for pyloric Botox injections by GI Dr. Archibald presenting for evaluation of nausea and vomiting onset Wednesday that recurred this morning.  Patient was evaluated in the emergency room yesterday, was given treatment and was able to tolerate p.o., however this morning her vomiting returned and she has been unable to eat or drink.  Patient reports feeling lightheaded, has substernal burning. Denies abdominal pain, chest pain, shortness of breath, dysuria, hematuria, diarrhea.

## 2024-01-29 NOTE — H&P ADULT - ASSESSMENT
73F w/Hx of Restless Leg, HTN, HLD GERD, hiatal hernia repair x 2, fundoplication, presenting for evaluation of nausea and vomiting which is under investigation for gastroparesis with plan for pyloric Botox injections by GI Dr. Archibald at [facility] on 1/30/24. Care coordinated by ED team and GI team to get procedure done for patient here or to possibly transfer pt to [facility] if needed

## 2024-01-29 NOTE — ED ADULT NURSE NOTE - OBJECTIVE STATEMENT
73y female w/ pmh of GERD, gastroparesis presents to ED w/ abdominal pain. Pt states she has had ongoing nausea and decreased PO intake which has increased in severity. Pt states MD suspects gastroparesis and to come to ED for further eval/ admission. Pt states she has frequent episodes of dry heaving but no vomiting due to not eating. Pt denies fever, chills, chest pain, shortness of breath, diarrhea, urinary symptoms. Pt ambulatory with assistance.

## 2024-01-29 NOTE — ED ADULT NURSE REASSESSMENT NOTE - COMFORT CARE
assisted to bathroom/plan of care explained/repositioned/side rails up/warm blanket provided
plan of care explained/side rails up
plan of care explained/repositioned/side rails up
assisted to bathroom

## 2024-01-29 NOTE — CONSULT NOTE ADULT - SUBJECTIVE AND OBJECTIVE BOX
HPI:  73F w/Hx of Restless Leg, HTN, HLD GERD, intrathoracic paraesophageal hernia repair x 22 years ago with redo on 23 for chronic cough and hiatel hernia complicated new gastroparesis presents for evaluation of nausea and vomiting. Patient with recent admission on  for similar presentation with work up revealing solid gastric emptying test with 92% retention at 4 hrs and EGD that was limited in setting of food in the stomach. Pt saw Dr. Bernstein with plan for EGD +/- pyloric injection after ruling out any possible obstruction. Since she has been off reglan, she has been having ongoing N/V after eating or drinking water. In the ED, patient was found with low K. GI called for further evaluation.     Allergies:  penicillins (Urticaria)  erythromycin (Stomach Upset; Vomiting; Nausea)  Dilantin (Urticaria)        Hospital Medications:  atorvastatin 10 milliGRAM(s) Oral at bedtime  hydrochlorothiazide 25 milliGRAM(s) Oral daily  lactated ringers. 1000 milliLiter(s) IV Continuous <Continuous>  metoclopramide Injectable 5 milliGRAM(s) IV Push every 6 hours PRN  potassium chloride  20 mEq/100 mL IVPB 20 milliEquivalent(s) IV Intermittent every 2 hours  pramipexole 3.75 milliGRAM(s) Oral at bedtime  sucralfate suspension 1 Gram(s) Oral every 6 hours      PMHX/PSHX:  H/O seasonal allergies    History of pulmonary embolism    Restless leg syndrome    GERD (gastroesophageal reflux disease)    HTN (hypertension)    OA (osteoarthritis)    Fungal infection of skin    H/O scoliosis    Essential hypertension    Depression    Osteoporosis    Right hip pain    2019 novel coronavirus disease (COVID-19)    Hiatal hernia    History of chronic pain    Peripheral neuropathy    S/P repair of paraesophageal hernia    S/P spinal fusion    Scoliosis    S/P spinal surgery    Removal of pin, plate, abigail, or screw    S/P hysterectomy    S/P hip replacement    S/P shoulder surgery    S/P dilatation and curettage    H/O arthroscopy of knee    H/O total knee replacement, right    S/P cataract surgery    History of bilateral hip replacements        Family history:      Social History: no smoking    ROS:   General:  No fevers, chills or night sweats  ENT:  No sore throat or dysphagia  CV:  No pain or palpitations  Resp:  No dyspnea, cough or  wheezing  GI:  as above  Skin:  No rash or edema  Neuro: no weakness   Hematologic: no bleeding  Musculoskeletal: no muscle pain or join pain  Psych: no agitation     : no dysuria      PHYSICAL EXAM:   GENERAL:  NAD, Appears stated age  HEENT:  NC/AT,  conjunctivae clear and pink, sclera -anicteric  CHEST:  CTA B/L, Normal effort  HEART:  RRR S1/S2,  ABDOMEN:  Soft, non-tender, non-distended,  no masses   EXTREMITIES:  No cyanosis or Edema  SKIN:  Warm & Dry. No rash or erythema  NEURO:  Alert, oriented, no focal deficit    Vital Signs:  Vital Signs Last 24 Hrs  T(C): 36.3 (2024 07:31), Max: 37 (2024 20:43)  T(F): 97.4 (2024 07:31), Max: 98.6 (2024 20:43)  HR: 76 (2024 07:31) (76 - 89)  BP: 139/63 (2024 07:31) (139/63 - 193/79)  BP(mean): --  RR: 18 (2024 07:31) (18 - 20)  SpO2: 97% (2024 07:31) (94% - 97%)    Parameters below as of 2024 07:38  Patient On (Oxygen Delivery Method): room air      Daily Height in cm: 157.48 (2024 20:43)    Daily     LABS:                        12.7   6.11  )-----------( 308      ( 2024 00:38 )             39.8     Mean Cell Volume: 85.4 fl (24 @ 00:38)        138  |  97  |  18  ----------------------------<  121<H>  2.9<LL>   |  31  |  0.68    Ca    10.5      2024 00:38  Phos  3.1       Mg     1.8         TPro  7.3  /  Alb  4.4  /  TBili  0.5  /  DBili  x   /  AST  20  /  ALT  24  /  AlkPhos  136<H>      LIVER FUNCTIONS - ( 2024 00:38 )  Alb: 4.4 g/dL / Pro: 7.3 g/dL / ALK PHOS: 136 U/L / ALT: 24 U/L / AST: 20 U/L / GGT: x             Urinalysis Basic - ( 2024 02:13 )    Color: Yellow / Appearance: Clear / S.008 / pH: x  Gluc: x / Ketone: Negative mg/dL  / Bili: Negative / Urobili: 0.2 mg/dL   Blood: x / Protein: Negative mg/dL / Nitrite: Negative   Leuk Esterase: Negative / RBC: 1 /HPF / WBC 0 /HPF   Sq Epi: x / Non Sq Epi: 1 /HPF / Bacteria: Negative /HPF      Amylase Serum--      Lipase serum24       Ammonia--                          12.7   6.11  )-----------( 308      ( 2024 00:38 )             39.8                         12.8   7.80  )-----------( 307      ( 2024 00:31 )             39.0     Imaging:

## 2024-01-29 NOTE — ED PROVIDER NOTE - CARE PLAN
1 Principal Discharge DX:	Nausea and vomiting  Secondary Diagnosis:	Moderate dehydration   Principal Discharge DX:	Nausea and vomiting  Secondary Diagnosis:	Moderate dehydration  Secondary Diagnosis:	Hypokalemia

## 2024-01-29 NOTE — ED PROVIDER NOTE - NS ED MD TWO NIGHTS YN
Telephone Encounter by Alondra Armando MA at 08/24/17 03:12 PM     Author:  Alondra Armando MA Service:  (none) Author Type:  Medical Assistant     Filed:  08/24/17 03:13 PM Encounter Date:  8/24/2017 Status:  Signed     :  Alondra Armando MA (Medical Assistant)            To   Last OV:[DG1.1T]06/28/17[DG1.1M]  Last Refill:[DG1.1T]12/30/15    Routed to  for approval[DG1.1M]          Revision History        User Key Date/Time User Provider Type Action    > DG1.1 08/24/17 03:13 PM Alondra Armando MA Medical Assistant Sign    M - Manual, T - Template            
Yes

## 2024-01-29 NOTE — PROGRESS NOTE ADULT - PROBLEM SELECTOR PLAN 4
- hold HCTZ given hypokalemia  - monitor BP closely   - can give Hydralazine IVP 10mg prn for SBP > 160

## 2024-01-29 NOTE — DISCHARGE NOTE PROVIDER - NSDCFUSCHEDAPPT_GEN_ALL_CORE_FT
Haydee Bernstein  [facility]  [id number]  Scheduled Appointment: 01/30/2024    Haydee Bernstein  [facility]  LIJOP Amb Surg Endoscopy  Scheduled Appointment: 01/30/2024    [facility]  ENDOCRIN [address]  Scheduled Appointment: 02/02/2024    Camilo Warren  [facility]  PULMMED [address]  Scheduled Appointment: 02/13/2024    Michael Ann  [facility]  CARDIOLOGY [address] [facility]   Scheduled Appointment: 02/15/2024    Iggy Copeland  [facility]  CARDIOLOGY [address] [facility]   Scheduled Appointment: 02/27/2024    Artur Jacobo  [facility]  ENDOCRIN [address]  Scheduled Appointment: 03/04/2024    Gisela Alvarez  [facility]  NEUROLOGY [address] [facility] [address]  Scheduled Appointment: 03/18/2024     [facility]  ENDOCRIN [address] [facility]  Scheduled Appointment: 02/02/2024    Camilo Warren  [facility]  PULMMED [address]  Scheduled Appointment: 02/13/2024    Michael Ann  [facility]  CARDIOLOGY [address] [facility]   Scheduled Appointment: 02/15/2024    Iggy Copeland  [facility]  CARDIOLOGY [address] [facility]   Scheduled Appointment: 02/27/2024    Artur Jacobo  [facility]  ENDOCRIN [address]  Scheduled Appointment: 03/04/2024    Gisela Alvarez  [facility]  NEUROLOGY [address] [facility] [address]  Scheduled Appointment: 03/18/2024     [facility]  ENDOCRIN [address]  Scheduled Appointment: 02/02/2024    Camilo Warren  [facility]  PULMMED [address]  Scheduled Appointment: 02/13/2024    Michael Ann  [facility]  CARDIOLOGY [address] [facility]   Scheduled Appointment: 02/15/2024    Iggy Copeland  [facility]  CARDIOLOGY [address] [facility]   Scheduled Appointment: 02/27/2024    Fran Carr  [facility]  [address]  Scheduled Appointment: 02/28/2024    Artur Jacobo  [facility]  ENDOCRIN [address]  Scheduled Appointment: 03/04/2024    Gisela Alvarez  [facility]  NEUROLOGY [address]  Scheduled Appointment: 03/18/2024

## 2024-01-29 NOTE — ED PROVIDER NOTE - PROGRESS NOTE DETAILS
Angie Prieto MD, PGY2: discussed with surg pts case and plan for admission, surg resident states will discuss with fellow and touch base with recommendations. pt agrees with plan for admission. s/p K repletion. labs otherwise nonactionable Angie Prieto MD, PGY2:  Surgery states no indication for admission to surgery service at this time.  Patient is scheduled to have her pyloric Botox injection with Dr. Archibald tomorrow at [facility], patient is amenable to transfer if indicated, I paged and emailed GI to inquire if patient would require transfer to [facility] for the procedure, patient is currently not a safe discharge home secondary to her p.o. intolerance with hypokalemia. Angie Prieto MD, PGY2: discussed with GI fellow Bruna Sorto, states pt can stay at [facility] and the GI team will coordinate her care

## 2024-01-29 NOTE — DISCHARGE NOTE PROVIDER - CARE PROVIDER_API CALL
Haydee Bernstein  Gastroenterology  [address] [place] [address], [facility] [place] [address]  [place] [address]  Phone: [phone]  Fax: [phone]  Established Patient  Follow Up Time:

## 2024-01-29 NOTE — H&P ADULT - HISTORY OF PRESENT ILLNESS
73F w/Hx of Restless Leg, HTN, HLD GERD, hiatal hernia repair x 2, fundoplication, presenting for evaluation of nausea and vomiting which is under investigation for gastroparesis with plan for pyloric Botox injections by GI Dr. Archibald at [facility] on 1/30/24. Care coordinated by ED team and GI team to get procedure done for patient here or to possibly transfer pt to [facility] if needed. Patient reports last significant meal was a hot dog on wednesday but today ate some egg yolks, apple sauce and a tomato. She became unwell vomited a little and began feeling palpitations which worried her son who urged her to come to the hospital for evaluation. She currently feels better since arrival and has no complaints on my exam. Patient denies fever, chills, chest pain, SOB, headache, dizziness, abd pain, dysuria.

## 2024-01-29 NOTE — CONSULT NOTE ADULT - SUBJECTIVE AND OBJECTIVE BOX
SURGERY CONSULT NOTE     HISTORY OF PRESENT ILLNESS:  HPI: 73-year-old female history of hypertension, hyperlipidemia, GERD, hiatal hernia repair x 2, fundoplication, under investigation for gastroparesis with plan for pyloric Botox injections by GI Dr. Archibald presenting for evaluation of nausea and vomiting onset Wednesday that recurred this morning. Patient was evaluated in the ED on 1/27 with nausea and vomiting. Patient evaluated by surgical team at that time. Patient states that the N/V started after she held her Reglan for a planned procedure with GI. Patient states she has had multiple episodes of emesis per day. Patient is passing flatus/ having BMs. In the ED, patient AF, HDS. Labs within normal limits. CT scan on 1/27 showing no evidence of obstruction or any acute processes.       PAST MEDICAL HISTORY: H/O seasonal allergies    History of pulmonary embolism    Restless leg syndrome    GERD (gastroesophageal reflux disease)    HTN (hypertension)    OA (osteoarthritis)    Fungal infection of skin    H/O scoliosis    Essential hypertension    Depression    Osteoporosis    Right hip pain    2019 novel coronavirus disease (COVID-19)    Hiatal hernia    History of chronic pain    Peripheral neuropathy        PAST SURGICAL HISTORY: S/P repair of paraesophageal hernia    S/P spinal fusion    Scoliosis    S/P spinal surgery    Removal of pin, plate, abigail, or screw    S/P hysterectomy    S/P hip replacement    S/P shoulder surgery    S/P dilatation and curettage    H/O arthroscopy of knee    H/O total knee replacement, right    S/P cataract surgery    History of bilateral hip replacements        FAMILY HISTORY:     SOCIAL HISTORY:    CODE STATUS:     HOME MEDICATIONS:    ALLERGIES: penicillins (Urticaria)  erythromycin (Stomach Upset; Vomiting; Nausea)  Dilantin (Urticaria)      VITAL SIGNS:  ICU Vital Signs Last 24 Hrs  T(C): 37 (28 Jan 2024 20:43), Max: 37 (28 Jan 2024 20:43)  T(F): 98.6 (28 Jan 2024 20:43), Max: 98.6 (28 Jan 2024 20:43)  HR: 88 (28 Jan 2024 20:43) (88 - 88)  BP: 193/79 (28 Jan 2024 20:43) (193/79 - 193/79)  BP(mean): --  ABP: --  ABP(mean): --  RR: 20 (28 Jan 2024 20:43) (20 - 20)  SpO2: 94% (28 Jan 2024 20:43) (94% - 94%)    O2 Parameters below as of 28 Jan 2024 20:43  Patient On (Oxygen Delivery Method): room air            PHYSICAL EXAM:  Gen: NAD  Neuro: A&Ox3  Resp: no increased WOB, satting well on RA  Cardiac: appears well perfused, extremities warm  Abd: soft, nondistended    LABS:     IMAGING STUDIES: SURGERY CONSULT NOTE     HISTORY OF PRESENT ILLNESS:  HPI: 73-year-old female history of hypertension, hyperlipidemia, GERD, hiatal hernia repair x 2, fundoplication, under investigation for gastroparesis with plan for pyloric Botox injections by GI Dr. Archibald presenting for evaluation of nausea and vomiting onset Wednesday that recurred this morning. Patient was evaluated in the ED on 1/27 with nausea and vomiting. Patient evaluated by surgical team at that time. Patient states that the N/V started after she held her Reglan for a planned procedure with GI. Patient states she has had multiple episodes of emesis per day. Patient is passing flatus/ having BMs. In the ED, patient AF, HDS. Labs within normal limits. CT scan on 1/27 showing no evidence of obstruction or any acute processes.       PAST MEDICAL HISTORY: H/O seasonal allergies    History of pulmonary embolism    Restless leg syndrome    GERD (gastroesophageal reflux disease)    HTN (hypertension)    OA (osteoarthritis)    Fungal infection of skin    H/O scoliosis    Essential hypertension    Depression    Osteoporosis    Right hip pain    2019 novel coronavirus disease (COVID-19)    Hiatal hernia    History of chronic pain    Peripheral neuropathy        PAST SURGICAL HISTORY: S/P repair of paraesophageal hernia    S/P spinal fusion    Scoliosis    S/P spinal surgery    Removal of pin, plate, abigail, or screw    S/P hysterectomy    S/P hip replacement    S/P shoulder surgery    S/P dilatation and curettage    H/O arthroscopy of knee    H/O total knee replacement, right    S/P cataract surgery    History of bilateral hip replacements        FAMILY HISTORY:     SOCIAL HISTORY:    CODE STATUS:     HOME MEDICATIONS:    ALLERGIES: penicillins (Urticaria)  erythromycin (Stomach Upset; Vomiting; Nausea)  Dilantin (Urticaria)      VITAL SIGNS:  T(C): 37 (28 Jan 2024 20:43), Max: 37 (28 Jan 2024 20:43)  T(F): 98.6 (28 Jan 2024 20:43), Max: 98.6 (28 Jan 2024 20:43)  HR: 88 (28 Jan 2024 20:43) (88 - 88)  BP: 193/79 (28 Jan 2024 20:43) (193/79 - 193/79)  BP(mean): --  ABP: --  ABP(mean): --  RR: 20 (28 Jan 2024 20:43) (20 - 20)  SpO2: 94% (28 Jan 2024 20:43) (94% - 94%)    O2 Parameters below as of 28 Jan 2024 20:43  Patient On (Oxygen Delivery Method): room air    PHYSICAL EXAM:  Gen: NAD  Neuro: A&Ox3  Resp: no increased WOB, satting well on RA  Cardiac: appears well perfused, extremities warm  Abd: soft, nondistended    LABS:                        12.7   6.11  )-----------( 308      ( 29 Jan 2024 00:38 )             39.8     01-29    138  |  97  |  18  ----------------------------<  121<H>  2.9<LL>   |  31  |  0.68    Ca    10.5      29 Jan 2024 00:38  Phos  3.1     01-29  Mg     1.8     01-29    TPro  7.3  /  Alb  4.4  /  TBili  0.5  /  DBili  x   /  AST  20  /  ALT  24  /  AlkPhos  136<H>  01-29      CAPILLARY BLOOD GLUCOSE      IMAGING STUDIES:  < from: CT Abdomen and Pelvis w/ Oral Cont (01.27.24 @ 05:01) >  FINDINGS:  LOWER CHEST: Coronary artery calcification    LIVER: Stable cysts.  BILE DUCTS: Normal caliber.  GALLBLADDER: Within normal limits.  SPLEEN: Within normal limits.  PANCREAS: Within normal limits.  ADRENALS: Within normal limits.  KIDNEYS/URETERS: Within normal limits.    BLADDER: Within normal limits.  REPRODUCTIVE ORGANS: Hysterectomy. Partially obscured by streak artifact.    BOWEL: No bowel obstruction. Appendix is normal.  PERITONEUM: No ascites.  VESSELS: Atherosclerotic changes.  RETROPERITONEUM/LYMPH NODES: No lymphadenopathy.  ABDOMINAL WALL: Within normal limits.  BONES: Degenerative changes. Bilateral sacroiliac joint arthrosis.   Ankylosis of the visualized thoracolumbar spine.    IMPRESSION:  No bowel obstruction or evidence of acute bowel inflammation.    < end of copied text >

## 2024-01-29 NOTE — H&P ADULT - NSICDXPASTSURGICALHX_GEN_ALL_CORE_FT
PAST SURGICAL HISTORY:  H/O arthroscopy of knee June 2021    History of bilateral hip replacements     Removal of pin, plate, aibgail, or screw 1991- removal of billings abigail    S/P cataract surgery     S/P dilatation and curettage 1981    S/P hip replacement left (2008)    S/P hysterectomy 1982    S/P repair of paraesophageal hernia 2001    S/P shoulder surgery right (2012)    S/P spinal fusion L4-L5 1966    S/P spinal surgery x 2 1985, 1986    Scoliosis s/p placement of billings abigail x 2 1984

## 2024-01-29 NOTE — ED PROVIDER NOTE - ATTENDING CONTRIBUTION TO CARE
------------ATTENDING NOTE------------  pt w/ family c/o continued/increased nausea, small amts nbnb vomiting, anorexia, feeling dehydrated, complicated by recent ED visit for same, complicated by gastroparesis / GERD and past fundoplication, reviewed recent labs w/ hypokalemia, awaiting labs / close reassessments and c/w Surgery for planned admission -->  - Lj Segal MD   -----------------------------------------------

## 2024-01-29 NOTE — DISCHARGE NOTE PROVIDER - NSDCFUADDAPPT_GEN_ALL_CORE_FT
APPTS ARE READY TO BE MADE: [ x] YES    Best Family or Patient Contact (if needed):    Additional Information about above appointments (if needed):    1: pcp  2: GI  3:     Other comments or requests:    APPTS ARE READY TO BE MADE: [ x] YES    Best Family or Patient Contact (if needed):    Additional Information about above appointments (if needed):    1: pcp  2: GI  3:     Other comments or requests:       Appointment was scheduled in arian Haydee Hernandez 6/6 at 12pm sent task to office.  Dr. Carr appt 2/28 at 11:40am enc#[phone] sent task to office.

## 2024-01-29 NOTE — CONSULT NOTE ADULT - ASSESSMENT
ASSESSMENT: 73-year-old female history of hypertension, hyperlipidemia, GERD, hiatal hernia repair x 2, fundoplication, under investigation for gastroparesis with plan for pyloric Botox injections by GI Dr. Archibald presenting for evaluation of nausea and vomiting onset Wednesday that recurred this morning. In the ED, patient AF, HDS. Labs within normal limits. CT scan on 1/27 showing no evidence of obstruction or any acute processes.     RECS:  -To be discussed with fellow  ASSESSMENT: 73-year-old female history of hypertension, hyperlipidemia, GERD, hiatal hernia repair x 2, fundoplication, under investigation for gastroparesis with plan for pyloric Botox injections by GI Dr. Archibald presenting for evaluation of nausea and vomiting onset Wednesday that recurred this morning. In the ED, patient AF, HDS. Labs within normal limits. CT scan on 1/27 showing no evidence of obstruction or any acute processes.     RECS:  -No surgical intervention   -Recommend GI evaluation   -Monitor strict I/Os   -Monitor electrolytes, replete prn   -Discussed with fellow, Dr. Jordan Orellana Team, [phone]

## 2024-01-29 NOTE — H&P ADULT - TIME-BASED BILLING (NON-CRITICAL CARE)
Time-based billing (NON-critical care) bilateral UE Active ROM was WFL  (within functional limits)/bilateral UE Passive ROM was WFL  (within functional limits)

## 2024-01-29 NOTE — CONSULT NOTE ADULT - ASSESSMENT
73F w/Hx of Restless Leg, HTN, HLD GERD, intrathoracic paraesophageal hernia repair x 22 years ago with redo on 11/30/23 for chronic cough and hiatel hernia complicated new gastroparesis presents for evaluation of nausea and vomiting.     #Gastroparesis  -New onset after repeat fundoplication. Patient with recent admission on 12/23 for similar presentation with work up revealing solid gastric emptying test with 92% retention at 4 hrs and EGD that was limited in setting of food in the stomach. CT scan with no obvious organic pathology to explain pt sxs. Pt saw Dr. Bernstein with plan for EGD +/- pyloric injection after ruling out any possible obstruction. Given sxs recurred after being off reglan, would restart and have her follow up as OP.     Recommendations:  -Would restart Reglan 5 mg TID before meals  -Would discharge after IVF and K supplementation   -Patient should continue clear liquid diet only and proceed with scheduled OP EGD+/- botox injection tomorrow  -Rest per ED    Recommendations preliminary until signed by attending.     Carlos A Triana MD  Gastroenterology/Hepatology Fellow  1st option: [phone] (text or call), ONLY available from 7:00 am to 5:00 pm.   **Contact on-call GI fellow via answering service ([phone]) from 5pm-7am AND on weekends/holidays**  2nd option: Available via [employer] Teams  3rd option: Pager: [phone]

## 2024-01-29 NOTE — ED PROVIDER NOTE - PHYSICAL EXAMINATION
GENERAL: well appearing in no acute distress  HEAD: normocephalic, atraumatic  HEENT: normal conjunctiva, oral mucosa moist  CARDIAC: regular rate and rhythm, no appreciable murmurs,   PULM: normal breath sounds, clear to ascultation bilaterally, no rales, rhonchi, wheezing  GI: abdomen nondistended, soft, nontender, no guarding, rebound tenderness  NEURO:  AAOx3  MSK: no peripheral edema, no calf tenderness b/l  SKIN: well-perfused, extremities warm  PSYCH: appropriate mood and affect

## 2024-01-29 NOTE — DISCHARGE NOTE PROVIDER - HOSPITAL COURSE
HPI:  73F w/Hx of Restless Leg, HTN, HLD GERD, hiatal hernia repair x 2, fundoplication, presenting for evaluation of nausea and vomiting which is under investigation for gastroparesis with plan for pyloric Botox injections by GI Dr. Archibald at [facility] on 1/30/24. Care coordinated by ED team and GI team to get procedure done for patient here or to possibly transfer pt to [facility] if needed. Patient reports last significant meal was a hot dog on wednesday but today ate some egg yolks, apple sauce and a tomato. She became unwell vomited a little and began feeling palpitations which worried her son who urged her to come to the hospital for evaluation. She currently feels better since arrival and has no complaints on my exam. Patient denies fever, chills, chest pain, SOB, headache, dizziness, abd pain, dysuria. (29 Jan 2024 06:40)    Hospital Course:  ·  Problem: Gastroparesis.   ·  Plan: - follows outpatient w/GI Dr. Bernstein ; planned for EGD +/- pyloric injection tomorrow 1/30  - seen by GI in ED ; reccs to restart  Reglan 5 mg TID before meals  - c/w IVF and K supplementation   - c/w clear liquid diet   - plan to d/c in AM for scheduled OP EGD+/- botox injection.    Important Medication Changes and Reason:none    Active or Pending Issues Requiring Follow-up:    Advanced Directives:   [ ] Full code  [ ] DNR  [ ] Hospice    Discharge Diagnoses:         HPI:  73F w/Hx of Restless Leg, HTN, HLD GERD, hiatal hernia repair x 2, fundoplication, presenting for evaluation of nausea and vomiting which is under investigation for gastroparesis with plan for pyloric Botox injections by GI Dr. Archibald at [facility] on 1/30/24. Care coordinated by ED team and GI team to get procedure done for patient here or to possibly transfer pt to [facility] if needed. Patient reports last significant meal was a hot dog on wednesday but today ate some egg yolks, apple sauce and a tomato. She became unwell vomited a little and began feeling palpitations which worried her son who urged her to come to the hospital for evaluation. She currently feels better since arrival and has no complaints on my exam. Patient denies fever, chills, chest pain, SOB, headache, dizziness, abd pain, dysuria. (29 Jan 2024 06:40)    Hospital Course:  ·  Problem: Gastroparesis.   ·  Plan: - follows outpatient w/GI Dr. Bernstein ; planned for EGD +/- pyloric injection tomorrow 1/30  - seen by GI in ED ; reccs to restart  Reglan 5 mg TID before meals  - c/w IVF and K supplementation   - c/w clear liquid diet   - plan to d/c in AM for scheduled OP EGD+/- botox injection.    Important Medication Changes and Reason: none    Active or Pending Issues Requiring Follow-up:  F/U in [facility] on 1/30/24 for EGD w/ Botox     Advanced Directives:   [ X] Full code  [ ] DNR  [ ] Hospice    Discharge Diagnoses:  Gastroporesis

## 2024-01-29 NOTE — DISCHARGE NOTE PROVIDER - NSDCMRMEDTOKEN_GEN_ALL_CORE_FT
chlorthalidone 25 mg oral tablet: 1 tab(s) orally once a day  famotidine 40 mg/5 mL oral suspension: 5 milliliter(s) orally once a day  Lipitor 10 mg oral tablet: 1 tab(s) orally once a day (at bedtime)  metoclopramide 5 mg/5 mL oral syrup: 5 milliliter(s) orally every 6 hours MDD: 4  pantoprazole 40 mg oral granule, delayed release: 1 each orally 2 times a day  pramipexole 3.75 mg oral tablet, extended release: 1 tab(s) orally once a day  sucralfate 1 g/10 mL oral suspension: 10 milliliter(s) orally every 6 hours

## 2024-01-30 ENCOUNTER — APPOINTMENT (OUTPATIENT)
Dept: GASTROENTEROLOGY | Facility: CLINIC | Age: 74
End: 2024-01-30

## 2024-01-30 ENCOUNTER — APPOINTMENT (OUTPATIENT)
Dept: GASTROENTEROLOGY | Facility: HOSPITAL | Age: 74
End: 2024-01-30

## 2024-01-30 ENCOUNTER — RESULT REVIEW (OUTPATIENT)
Age: 74
End: 2024-01-30

## 2024-01-30 ENCOUNTER — TRANSCRIPTION ENCOUNTER (OUTPATIENT)
Age: 74
End: 2024-01-30

## 2024-01-30 ENCOUNTER — OUTPATIENT (OUTPATIENT)
Dept: OUTPATIENT SERVICES | Facility: HOSPITAL | Age: 74
LOS: 1 days | Discharge: HOME | End: 2024-01-30
Payer: MEDICARE

## 2024-01-30 VITALS
HEIGHT: 62 IN | HEART RATE: 88 BPM | WEIGHT: 173.06 LBS | TEMPERATURE: 96 F | DIASTOLIC BLOOD PRESSURE: 69 MMHG | RESPIRATION RATE: 14 BRPM | OXYGEN SATURATION: 96 % | SYSTOLIC BLOOD PRESSURE: 151 MMHG

## 2024-01-30 VITALS
SYSTOLIC BLOOD PRESSURE: 156 MMHG | HEART RATE: 93 BPM | OXYGEN SATURATION: 97 % | TEMPERATURE: 98 F | DIASTOLIC BLOOD PRESSURE: 74 MMHG | RESPIRATION RATE: 18 BRPM

## 2024-01-30 VITALS
OXYGEN SATURATION: 97 % | HEART RATE: 78 BPM | RESPIRATION RATE: 20 BRPM | DIASTOLIC BLOOD PRESSURE: 74 MMHG | SYSTOLIC BLOOD PRESSURE: 168 MMHG

## 2024-01-30 DIAGNOSIS — Z98.890 OTHER SPECIFIED POSTPROCEDURAL STATES: Chronic | ICD-10-CM

## 2024-01-30 DIAGNOSIS — Z98.49 CATARACT EXTRACTION STATUS, UNSPECIFIED EYE: Chronic | ICD-10-CM

## 2024-01-30 DIAGNOSIS — K21.9 GASTRO-ESOPHAGEAL REFLUX DISEASE WITHOUT ESOPHAGITIS: ICD-10-CM

## 2024-01-30 DIAGNOSIS — Z96.643 PRESENCE OF ARTIFICIAL HIP JOINT, BILATERAL: Chronic | ICD-10-CM

## 2024-01-30 LAB
CULTURE RESULTS: SIGNIFICANT CHANGE UP
SPECIMEN SOURCE: SIGNIFICANT CHANGE UP

## 2024-01-30 PROCEDURE — 91040 ESOPH BALLOON DISTENSION TST: CPT | Mod: 26

## 2024-01-30 PROCEDURE — 43239 EGD BIOPSY SINGLE/MULTIPLE: CPT

## 2024-01-30 PROCEDURE — 88305 TISSUE EXAM BY PATHOLOGIST: CPT | Mod: 26

## 2024-01-30 PROCEDURE — 99239 HOSP IP/OBS DSCHRG MGMT >30: CPT

## 2024-01-30 DEVICE — CATH ENDOFLIP MEASURE 16MM: Type: IMPLANTABLE DEVICE | Status: FUNCTIONAL

## 2024-01-30 RX ORDER — SODIUM CHLORIDE 9 MG/ML
500 INJECTION, SOLUTION INTRAVENOUS
Refills: 0 | Status: COMPLETED | OUTPATIENT
Start: 2024-01-30 | End: 2024-01-30

## 2024-01-30 RX ADMIN — SODIUM CHLORIDE 30 MILLILITER(S): 9 INJECTION, SOLUTION INTRAVENOUS at 11:10

## 2024-01-30 NOTE — PROGRESS NOTE ADULT - SUBJECTIVE AND OBJECTIVE BOX
General Surgery Daily Resident Progress Note    OVERNIGHT: CAROLANN.     SUBJECTIVE: Pt seen and examined at bedside. Pain well controlled.     OBJECTIVE:  Vital Signs Last 24 Hrs  T(C): 36.9 (29 Jan 2024 18:45), Max: 36.9 (29 Jan 2024 14:34)  T(F): 98.5 (29 Jan 2024 18:45), Max: 98.5 (29 Jan 2024 18:45)  HR: 76 (29 Jan 2024 18:45) (75 - 89)  BP: 180/79 (29 Jan 2024 18:45) (139/63 - 181/91)  BP(mean): --  RR: 18 (29 Jan 2024 18:45) (18 - 18)  SpO2: 96% (29 Jan 2024 18:45) (95% - 98%)    Parameters below as of 29 Jan 2024 18:45  Patient On (Oxygen Delivery Method): room air        Physical Exam:  General Appearance: No acute distress, resting comfortably.   Chest: Equal expansion bilaterally, no increased WOB.   CV: WWP.   Abdomen: Soft, nontender, nondistended.   Extremities: No gross deformity appreciated.     LABS:                        11.8   5.33  )-----------( 284      ( 29 Jan 2024 09:08 )             36.7     01-29    139  |  103  |  10  ----------------------------<  108<H>  4.4   |  27  |  0.70    Ca    8.9      29 Jan 2024 20:15  Phos  3.1     01-29  Mg     1.8     01-29    TPro  7.3  /  Alb  4.4  /  TBili  0.5  /  DBili  x   /  AST  20  /  ALT  24  /  AlkPhos  136<H>  01-29      Urinalysis Basic - ( 29 Jan 2024 20:15 )    Color: x / Appearance: x / SG: x / pH: x  Gluc: 108 mg/dL / Ketone: x  / Bili: x / Urobili: x   Blood: x / Protein: x / Nitrite: x   Leuk Esterase: x / RBC: x / WBC x   Sq Epi: x / Non Sq Epi: x / Bacteria: x     General Surgery Daily Resident Progress Note    OVERNIGHT: CAROLANN.     SUBJECTIVE: Pt seen and examined at bedside. Pain well controlled. nausea and vomiting better controlled. No f/c. No other complaints at this time    OBJECTIVE:  Vital Signs Last 24 Hrs  T(C): 36.9 (29 Jan 2024 18:45), Max: 36.9 (29 Jan 2024 14:34)  T(F): 98.5 (29 Jan 2024 18:45), Max: 98.5 (29 Jan 2024 18:45)  HR: 76 (29 Jan 2024 18:45) (75 - 89)  BP: 180/79 (29 Jan 2024 18:45) (139/63 - 181/91)  BP(mean): --  RR: 18 (29 Jan 2024 18:45) (18 - 18)  SpO2: 96% (29 Jan 2024 18:45) (95% - 98%)    Parameters below as of 29 Jan 2024 18:45  Patient On (Oxygen Delivery Method): room air        Physical Exam:  General Appearance: No acute distress, resting comfortably.   Chest: Equal expansion bilaterally, no increased WOB.   CV: WWP.   Abdomen: Soft, nontender, nondistended.   Extremities: No gross deformity appreciated.     LABS:                        11.8   5.33  )-----------( 284      ( 29 Jan 2024 09:08 )             36.7     01-29    139  |  103  |  10  ----------------------------<  108<H>  4.4   |  27  |  0.70    Ca    8.9      29 Jan 2024 20:15  Phos  3.1     01-29  Mg     1.8     01-29    TPro  7.3  /  Alb  4.4  /  TBili  0.5  /  DBili  x   /  AST  20  /  ALT  24  /  AlkPhos  136<H>  01-29      Urinalysis Basic - ( 29 Jan 2024 20:15 )    Color: x / Appearance: x / SG: x / pH: x  Gluc: 108 mg/dL / Ketone: x  / Bili: x / Urobili: x   Blood: x / Protein: x / Nitrite: x   Leuk Esterase: x / RBC: x / WBC x   Sq Epi: x / Non Sq Epi: x / Bacteria: x

## 2024-01-30 NOTE — DISCHARGE NOTE NURSING/CASE MANAGEMENT/SOCIAL WORK - PATIENT PORTAL LINK FT
You can access the FollowMyHealth Patient Portal offered by [facility] by registering at the following website: [email or web address] By joining [email or web address] [email or web address] portal, you will also be able to view your health information using other applications (apps) compatible with our system.

## 2024-01-30 NOTE — ASU DISCHARGE PLAN (ADULT/PEDIATRIC) - NS MD DC FALL RISK RISK
For information on Fall & Injury Prevention, visit: [email or web address] OR  [email or web address] OR  https://www.cdc.gov/steadi/patient.html

## 2024-01-30 NOTE — DISCHARGE NOTE NURSING/CASE MANAGEMENT/SOCIAL WORK - NSDCFUADDAPPT_GEN_ALL_CORE_FT
APPTS ARE READY TO BE MADE: [ x] YES    Best Family or Patient Contact (if needed):    Additional Information about above appointments (if needed):    1: pcp  2: GI  3:     Other comments or requests:

## 2024-01-30 NOTE — ASU DISCHARGE PLAN (ADULT/PEDIATRIC) - FOR NEXT 24 HOURS DO NOT:
PRE-OP DIAGNOSIS:  Cholelithiasis with acute on chronic cholecystitis 19-Jan-2023 14:23:39  Ta Leone  
Statement Selected

## 2024-01-30 NOTE — PROGRESS NOTE ADULT - ASSESSMENT
73-year-old female history of hypertension, hyperlipidemia, GERD, hiatal hernia repair x 2, fundoplication, under investigation for gastroparesis with plan for pyloric Botox injections by GI Dr. Archibald presenting for evaluation of nausea and vomiting onset Wednesday that recurred this morning. In the ED, patient AF, HDS. Labs within normal limits. CT scan on 1/27 showing no evidence of obstruction or any acute processes.     RECS:  -No surgical intervention   -GI consulted, recs appreciated  -Monitor strict I/Os   -Monitor electrolytes, replete prn     Green Team  [id number] 73-year-old female history of hypertension, hyperlipidemia, GERD, hiatal hernia repair x 2, fundoplication, under investigation for gastroparesis with plan for pyloric Botox injections by GI Dr. Archibald presenting for evaluation of nausea and vomiting onset Wednesday that recurred this morning. In the ED, patient AF, HDS. Labs within normal limits. CT scan on 1/27 showing no evidence of obstruction or any acute processes.     RECS:  -No surgical intervention   - CLD with reglan  -Monitor strict I/Os   -Monitor electrolytes, replete prn   - DC today for EGD +/- botox with Dr. Archibald at [facility]    Vivian Ortega PGY6  MIS/Bariatric fellow  Green Team  [id number]

## 2024-01-30 NOTE — PRE PROCEDURE NOTE - PRE PROCEDURE EVALUATION
Pre-Endoscopy Evaluation    Referring Physician:                  Dr Ethan Galaviz                    Indication for Procedure:  gastroparesis, esophageal ulcers    Sedation by Anesthesia [X ]    PAST MEDICAL & SURGICAL HISTORY:  H/O seasonal allergies      History of pulmonary embolism  developed after billings abigail surgery,1984 treated with coumadin 3 months, no issues after      Restless leg syndrome      GERD (gastroesophageal reflux disease)      OA (osteoarthritis)      Fungal infection of skin  under breast      H/O scoliosis      Essential hypertension      Depression      Osteoporosis      Right hip pain      2019 novel coronavirus disease (COVID-19)  Dec 2020- hospitalized for 3 days, did not require home O2, denies residual symptoms      Hiatal hernia      History of chronic pain      Peripheral neuropathy      S/P repair of paraesophageal hernia  2001      S/P spinal fusion  L4-L5 1966      Scoliosis  s/p placement of billings abigail x 2 1984      S/P spinal surgery  x 2 1985, 1986      Removal of pin, plate, abigail, or screw  1991- removal of billings abigail      S/P hysterectomy  1982      S/P hip replacement  left (2008)      S/P shoulder surgery  right (2012)      S/P dilatation and curettage  1981      H/O arthroscopy of knee  June 2021      S/P cataract surgery      History of bilateral hip replacements          Latex allergy: [ ] yes [X] no    Smoking: [ ] yes  [X] no    AICD/PPM: [ ] yes   [X] no    Pertinent lab data:                        11.8   5.33  )-----------( 284      ( 29 Jan 2024 09:08 )             36.7     01-29    139  |  103  |  10  ----------------------------<  108<H>  4.4   |  27  |  0.70    Ca    8.9      29 Jan 2024 20:15  Phos  3.1     01-29  Mg     1.8     01-29    TPro  7.3  /  Alb  4.4  /  TBili  0.5  /  DBili  x   /  AST  20  /  ALT  24  /  AlkPhos  136<H>  01-29                Physical Examination:  Daily Height in cm: 157.48 (30 Jan 2024 09:58)    Daily   Vital Signs Last 24 Hrs  T(C): 35.6 (30 Jan 2024 09:58), Max: 36.9 (29 Jan 2024 14:34)  T(F): 96 (30 Jan 2024 09:58), Max: 98.5 (29 Jan 2024 18:45)  HR: 88 (30 Jan 2024 09:58) (75 - 93)  BP: 151/69 (30 Jan 2024 09:58) (151/69 - 181/91)  BP(mean): --  RR: 14 (30 Jan 2024 09:58) (14 - 18)  SpO2: 96% (30 Jan 2024 09:58) (95% - 98%)    Parameters below as of 30 Jan 2024 09:58  Patient On (Oxygen Delivery Method): room air        Constitutional: NAD    HEENT: PERRLA, EOMI,       Neck:  No JVD    Respiratory: CTAB/L    Cardiovascular: S1 and S2    Gastrointestinal: BS+, soft, NT/ND    Extremities: No peripheral edema    Neurological: A/O x 3, no focal deficits    Psychiatric: Normal mood, normal affect    : No Blake    Skin: No rashes    Comments:    ASA Class: I [ ]  II [ ]  III [X ]  IV [ ]    The patient is a suitable candidate for the planned procedure unless box checked [ ]  No, explain:

## 2024-01-30 NOTE — ASU PREOP CHECKLIST - TEMPERATURE IN FAHRENHEIT (DEGREES F)
96
Hypertension    Insulin controlled gestational diabetes mellitus (GDM) during pregnancy, antepartum    Iron deficiency anemia, unspecified iron deficiency anemia type

## 2024-01-30 NOTE — ASU DISCHARGE PLAN (ADULT/PEDIATRIC) - CARE PROVIDER_API CALL
Jose Alejandro Aleman  Gastroenterology  [address] [address]  Phone: [phone]  Fax: [phone]  Follow Up Time: 2 weeks

## 2024-02-02 ENCOUNTER — APPOINTMENT (OUTPATIENT)
Dept: ENDOCRINOLOGY | Facility: CLINIC | Age: 74
End: 2024-02-02

## 2024-02-03 RX ORDER — FAMOTIDINE 40 MG/5ML
40 POWDER, FOR SUSPENSION ORAL DAILY
Qty: 3 | Refills: 5 | Status: ACTIVE | COMMUNITY
Start: 2024-02-03 | End: 1900-01-01

## 2024-02-05 ENCOUNTER — APPOINTMENT (OUTPATIENT)
Dept: ENDOCRINOLOGY | Facility: CLINIC | Age: 74
End: 2024-02-05
Payer: MEDICARE

## 2024-02-05 VITALS — WEIGHT: 185 LBS | HEIGHT: 60.5 IN | BODY MASS INDEX: 35.38 KG/M2

## 2024-02-05 PROCEDURE — 77085 DXA BONE DENSITY AXL VRT FX: CPT | Mod: GA

## 2024-02-07 RX ORDER — METOCLOPRAMIDE HYDROCHLORIDE 5 MG/5ML
10 SOLUTION ORAL 4 TIMES DAILY
Qty: 600 | Refills: 5 | Status: ACTIVE | COMMUNITY
Start: 2024-02-03 | End: 1900-01-01

## 2024-02-08 LAB — SURGICAL PATHOLOGY STUDY: SIGNIFICANT CHANGE UP

## 2024-02-13 ENCOUNTER — APPOINTMENT (OUTPATIENT)
Dept: PULMONOLOGY | Facility: CLINIC | Age: 74
End: 2024-02-13

## 2024-02-15 ENCOUNTER — APPOINTMENT (OUTPATIENT)
Dept: CARDIOLOGY | Facility: CLINIC | Age: 74
End: 2024-02-15

## 2024-02-21 ENCOUNTER — APPOINTMENT (OUTPATIENT)
Dept: INTERNAL MEDICINE | Facility: CLINIC | Age: 74
End: 2024-02-21
Payer: MEDICARE

## 2024-02-21 ENCOUNTER — OUTPATIENT (OUTPATIENT)
Dept: OUTPATIENT SERVICES | Facility: HOSPITAL | Age: 74
LOS: 1 days | End: 2024-02-21
Payer: MEDICARE

## 2024-02-21 DIAGNOSIS — G89.4 CHRONIC PAIN SYNDROME: ICD-10-CM

## 2024-02-21 DIAGNOSIS — Z96.643 PRESENCE OF ARTIFICIAL HIP JOINT, BILATERAL: Chronic | ICD-10-CM

## 2024-02-21 DIAGNOSIS — Z98.890 OTHER SPECIFIED POSTPROCEDURAL STATES: Chronic | ICD-10-CM

## 2024-02-21 DIAGNOSIS — Z98.49 CATARACT EXTRACTION STATUS, UNSPECIFIED EYE: Chronic | ICD-10-CM

## 2024-02-21 DIAGNOSIS — I10 ESSENTIAL (PRIMARY) HYPERTENSION: ICD-10-CM

## 2024-02-21 PROCEDURE — 99212 OFFICE O/P EST SF 10 MIN: CPT

## 2024-02-21 PROCEDURE — G0463: CPT

## 2024-02-21 RX ORDER — SUCRALFATE 1 G/10ML
1 SUSPENSION ORAL
Qty: 3 | Refills: 3 | Status: ACTIVE | COMMUNITY
Start: 2024-02-21 | End: 1900-01-01

## 2024-02-21 NOTE — HISTORY OF PRESENT ILLNESS
[de-identified] : Wishes to restart Cymbalta for chronic pain; was stopped during one of recent hospitalizations.  Panning a pyloric procedure to try to treat chronic gastroparesis.

## 2024-02-26 ENCOUNTER — APPOINTMENT (OUTPATIENT)
Dept: ENDOCRINOLOGY | Facility: CLINIC | Age: 74
End: 2024-02-26

## 2024-02-27 ENCOUNTER — APPOINTMENT (OUTPATIENT)
Dept: GASTROENTEROLOGY | Facility: CLINIC | Age: 74
End: 2024-02-27
Payer: MEDICARE

## 2024-02-27 ENCOUNTER — APPOINTMENT (OUTPATIENT)
Dept: CARDIOLOGY | Facility: CLINIC | Age: 74
End: 2024-02-27
Payer: MEDICARE

## 2024-02-27 VITALS
OXYGEN SATURATION: 97 % | WEIGHT: 188 LBS | BODY MASS INDEX: 35.96 KG/M2 | SYSTOLIC BLOOD PRESSURE: 156 MMHG | HEIGHT: 60.5 IN | HEART RATE: 102 BPM | DIASTOLIC BLOOD PRESSURE: 88 MMHG

## 2024-02-27 VITALS — SYSTOLIC BLOOD PRESSURE: 112 MMHG | DIASTOLIC BLOOD PRESSURE: 68 MMHG

## 2024-02-27 VITALS
BODY MASS INDEX: 35.34 KG/M2 | WEIGHT: 184 LBS | HEART RATE: 101 BPM | DIASTOLIC BLOOD PRESSURE: 74 MMHG | SYSTOLIC BLOOD PRESSURE: 166 MMHG | OXYGEN SATURATION: 95 %

## 2024-02-27 DIAGNOSIS — Z01.810 ENCOUNTER FOR PREPROCEDURAL CARDIOVASCULAR EXAMINATION: ICD-10-CM

## 2024-02-27 DIAGNOSIS — R11.2 NAUSEA WITH VOMITING, UNSPECIFIED: ICD-10-CM

## 2024-02-27 PROCEDURE — 99214 OFFICE O/P EST MOD 30 MIN: CPT

## 2024-02-27 PROCEDURE — 99213 OFFICE O/P EST LOW 20 MIN: CPT

## 2024-02-27 RX ORDER — METOPROLOL SUCCINATE 25 MG/1
25 TABLET, EXTENDED RELEASE ORAL DAILY
Qty: 30 | Refills: 5 | Status: ACTIVE | COMMUNITY
Start: 2024-02-27 | End: 1900-01-01

## 2024-02-27 RX ORDER — ONDANSETRON 4 MG/1
4 TABLET ORAL EVERY 8 HOURS
Qty: 30 | Refills: 3 | Status: ACTIVE | COMMUNITY
Start: 2024-02-27 | End: 1900-01-01

## 2024-02-27 NOTE — PLAN
[TextEntry] : Add metoprolol succinate 25 mg once daily All medications and supplements reviewed and verified with patient; no other changes. As above may proceed with gastric peroral endoscopic myotomy. Continue to monitor blood pressure at home.  Instructed as to proper timing and technique. Follow-up 6 weeks

## 2024-02-27 NOTE — ASSESSMENT
[FreeTextEntry1] : I have discussed that her GES is out of proportion to symptoms. her symptoms are Likely related to GP and definitely worse after HH repair. She wants to proceed with G POEM. I Have discussed that possibility of no response to G POEM. She would like to get off of Reglan. Rx zofran. Add Gaviscon/alginate. Plan for G POEm after cardiac clearance.

## 2024-02-27 NOTE — HISTORY OF PRESENT ILLNESS
[FreeTextEntry1] : 74 with very delayed GES post fundoplication. This is the second hernia repair patient has had. She has significant regurgitation and reflux after the procedure. Vomiting is not common. She also reports abdominal pain. BMs normal on laxatives, otherwise constipated. Constant burning chest pain and throat clearing. Moderate early satiety. GES 94% retention at 4 hrs. She is on daily Reglan for regurgitation and nausea.

## 2024-02-27 NOTE — PHYSICAL EXAM
[Alert] : alert [Normal Voice/Communication] : normal voice/communication [Healthy Appearing] : healthy appearing [No Acute Distress] : no acute distress [Sclera] : the sclera and conjunctiva were normal [Hearing Threshold Finger Rub Not [place]] : hearing was normal [Normal Lips/Gums] : the lips and gums were normal [Oropharynx] : the oropharynx was normal [Normal Appearance] : the appearance of the neck was normal [No Neck Mass] : no neck mass was observed [No Respiratory Distress] : no respiratory distress [No Acc Muscle Use] : no accessory muscle use [Respiration, Rhythm And Depth] : normal respiratory rhythm and effort [Auscultation Breath Sounds / Voice Sounds] : lungs were clear to auscultation bilaterally [Heart Rate And Rhythm] : heart rate was normal and rhythm regular [Normal S1, S2] : normal S1 and S2 [Murmurs] : no murmurs [Bowel Sounds] : normal bowel sounds [Abdomen Tenderness] : non-tender [No Masses] : no abdominal mass palpated [Abdomen Soft] : soft [] : no hepatosplenomegaly [Oriented To Time, Place, And Person] : oriented to person, place, and time

## 2024-02-27 NOTE — HISTORY OF PRESENT ILLNESS
[FreeTextEntry1] : Presents in scheduled follow-up accompanied by her son.  Significant reflux and cough since her procedure and Dr. Aleman plans "G POEM".  Told that the vagus nerve was "injured" at the time of one of her procedures.  Monitoring blood pressure at home.  Readings vary between 130 and what typically 160s over 87.  Pulse is consistently 95 to 110 bpm.  Of note, she often checks her pressure immediately upon awakening and takes a single immediate determination without repeating.  No c/o chest, throat,jaw, arm or upper back discomfort.  No dyspnea, orthopnea or PND.  No palpitations, dizziness or syncope.  No edema or claudication.

## 2024-02-27 NOTE — ASSESSMENT
[FreeTextEntry1] : Hypertension with whitecoat element. Mild sinus tachycardia-?  Vagal injury?  RCRI equals 0. Functional capacity is approximately 4-5 METS.  There is no contraindication to the G POEM and may proceed without any additional cardiac restratification procedures.

## 2024-02-28 DIAGNOSIS — G89.4 CHRONIC PAIN SYNDROME: ICD-10-CM

## 2024-03-04 ENCOUNTER — APPOINTMENT (OUTPATIENT)
Dept: PULMONOLOGY | Facility: CLINIC | Age: 74
End: 2024-03-04

## 2024-03-06 ENCOUNTER — APPOINTMENT (OUTPATIENT)
Dept: ENDOCRINOLOGY | Facility: CLINIC | Age: 74
End: 2024-03-06
Payer: MEDICARE

## 2024-03-06 VITALS
SYSTOLIC BLOOD PRESSURE: 163 MMHG | HEIGHT: 60 IN | WEIGHT: 182 LBS | BODY MASS INDEX: 35.73 KG/M2 | HEART RATE: 64 BPM | OXYGEN SATURATION: 98 % | DIASTOLIC BLOOD PRESSURE: 80 MMHG

## 2024-03-06 DIAGNOSIS — R73.03 PREDIABETES.: ICD-10-CM

## 2024-03-06 DIAGNOSIS — M81.0 AGE-RELATED OSTEOPOROSIS W/OUT CURRENT PATHOLOGICAL FRACTURE: ICD-10-CM

## 2024-03-06 DIAGNOSIS — E04.2 NONTOXIC MULTINODULAR GOITER: ICD-10-CM

## 2024-03-06 PROCEDURE — 96372 THER/PROPH/DIAG INJ SC/IM: CPT

## 2024-03-06 PROCEDURE — 99214 OFFICE O/P EST MOD 30 MIN: CPT | Mod: 25

## 2024-03-06 NOTE — REVIEW OF SYSTEMS
[Fatigue] : fatigue [Constipation] : constipation [Back Pain] : back pain [Joint Pain] : joint pain [All other systems negative] : All other systems negative [Recent Weight Gain (___ Lbs)] : recent weight gain: [unfilled] lbs [Recent Weight Loss (___ Lbs)] : no recent weight loss [Visual Field Defect] : no visual field defect [Dysphagia] : dysphagia [Neck Pain] : no neck pain [Dysphonia] : no dysphonia [Chest Pain] : no chest pain [Shortness Of Breath] : no shortness of breath [Nausea] : no nausea [Vomiting] : no vomiting [Diarrhea] : no diarrhea [Polyuria] : no polyuria [Stress] : stress [Polydipsia] : no polydipsia [FreeTextEntry6] : coughs with food awaiting pulm eval [FreeTextEntry7] : +gastroparesis [FreeTextEntry8] : Menopause

## 2024-03-06 NOTE — DATA REVIEWED
[FreeTextEntry1] : Focused Thyroid US 3/6/2024: Right Upper Nodule 1.64 x 1.24 x 1.18 with punctate foci; Right Lower Nodule 0.78 x 0.85 x 0.88; Right Parathyroid 1.16 x 0.73 x 0.84; Left Nodule appears to be 2 distinct nodules 0.97 x 0.75 x 0.77 with coarse calcification and smaller 0.5 cm nodule adjacent. Additional b/l subcentimeter cysts and nodules too numerous to characterize.   Labs 11/2023: Glucose 130 BMP otherwise normal Ca 9.7  Labs 3/2023: CMP normal except glucose of 124 ALKP 162  Labs 12/2022: Prolactin 0.7 TSH 1.87 CMP normal except glucose of 104 and ALKP 141 Testosterone <2.5  Focused Thyroid US 8/17/2022: Right Upper Nodule 1.52 x 1.32 x 0.77; Right Lower Nodule 0.78 x 0.85 x 0.88; Right Parathyroid 1.16 x 0.73 x 0.84; Left Nodule appears to be 2 distinct nodules 0.97 x 0.75 x 0.77 with coarse calcification and smaller 0.5 cm nodule adjacent. Additional b/l subcentimeter cysts and nodules too numerous to characterize.   Labs 7/2022: CBC normal Ca around 9.0 A1c 5.8%  Chol 227 HDL 91  CMP normal except sodium of 134, glucose 56 likely due to hemolysis, ALKP 129  Labs 1/2022: Ca 8.0  Focused Thyroid US 8/10/2021: Right Upper Nodule 1.42 x 1.21 x 0.78; Right Lower Nodule 0.87 x 0.83 x 0.86; Right Parathyroid 1.16 x 0.73 x 0.84; Left Nodule 1.28 x 0.77 x 1.05 with coarse calcification. Additional b/l subcentimeter cysts and nodules too numerous to characterize.   Labs 7/2021: CMP normal except glucose of 107 Vit D 26.6  Labs 10/2019: CBC normal  HDL 63 Chol 242 Trig 188 CMP normal TSH 1.67 A1c 5.7%  Labs 3/2019:   Thyroid Ultrasound Report 1/7/19:  Technique: multiple real time longitudinal and transverse images were obtained using a high resolution ultrasound with a linear transducer, Second Decimal e 2008 model, 10-12 MHz frequencies. All measurements will be reported as longitudinal x sal-posterior x transverse.  Comparison: Report dated 5/2016.   Indication: thyroid nodule.   Findings:  The right thyroid lobe measures 3.66 x 1.33 x 1.64 cm. The left thyroid lobe measures 4.12 x 1.21 x 1.68 cm. The isthmus measures 0.25 cm.  The right thyroid lobe has a heterogeneous parenchyma.  The left thyroid lobe has a heterogeneous parenchyma .    In the right mid pole there is a  mixed, heterogenous nodule. It measures 1.33 x 0.77 x 1.17 cm. The nodule is ovoid in shape and the border is distinct. It has peripheral vascularity.   In the right lower pole there is a  mixed. It measures 0.95 x 0.81 x 0.96 cm. The nodule is ovoid in shape and the border is distinct. It has peripheral vascularity.   In the left mid pole there is a  mixed, heterogenous nodule. It measures 1.11 x 1.03 x 0.94 cm. The nodule is ovoid in shape and the border is distinct. It has peripheral and scant internal vascularity.   In the left mid pole there is a  hypoechoic nodule. It measures 0.65 x 0.43 x 0.87 cm. The nodule is ovoid in shape and the border is smooth. The nodule does not have a halo. It demonstrates no calcifications. It has no vascularity. parathyroid adenoma posterior to R lobe 1.18 x 1.08 x 0.96 cm.   In the right upper pole there is a  cyst. It measures 0.75 x 0.56 x 0.68 cm. The nodule is round in shape and the border is smooth. The nodule does not have a halo. It demonstrates no calcifications. It has no vascularity.   Additional bilateral subcentimeter cysts and nodules too numerous to characterize.    Labs 9/2018: CBC normal CMP normal A1c 5.8% Ca 10 Corrected Ca 9.5  HDL 61 Choll 232 Trig 98 Vit D 43.2 Mg 1.9  Labs 7/2018: CBC normal A1c 5.7% CMP normal Ca 10 Corrected 9.6 PTH 46 TSH 2.26 Free T4 1.4 Phos 2.8 Vit D 39.8 Vit D 1,25 37.3  Labs 12/5/17: CBC normal Mg 1.9 Glucose 112 Ca 10 PTH 36 CMP otherwise normal Phos 3.5 Chol 228 Trig 243 HDL 59  TSH 1.67 Free T4 1.2 A1c 5.6% Vit D 30.9    Labs 4/2017: Glucose 107  HDL 76 Chol 245 Trig 205 Vit D 31.8 A1c 6.1%  Labs 11/2016: TSH 1.26 Free T4 1.3 CBC normal A1c 6.0% CMP normal except glucose of 115   FNA 5/31/16: Benign  Labs 5/2/16: PTH 33 Ca 10.2 TSH 1.36 Free T4 1.3  Thyroid US performed 5/2/16:  Indication: thyroid nodule.   Findings:  The right thyroid lobe measures 3.63 x 1.12 x 1.71 cm. The left thyroid lobe measures 3.73x 1.22 x 1.72 cm. The isthmus measures 0.29 cm.    In the right lower pole there is a  mixed, heterogenous nodule. It measures 1.29 x 0.71 x 1.1 cm. The nodule is ovoid in shape and the border is distinct. It has peripheral vascularity.   In the right lower pole there is a  mixed. It measures 0.83 0.52 x 0.70 cm. The nodule is ovoid in shape and the border is distinct. It has peripheral vascularity.   In the left lower pole there is a  mixed, heterogenous nodule. It measures 1.38 x 1.02 x 0.82 cm. The nodule is ovoid in shape and the border is distinct. It has peripheral and scant internal vascularity.   parathyroid adenoma posterior to R lobe 1.18 x 1.08 x 0.96 cm.   DEXA performed May 2,2016 spine not performed due to surgery Tot Hip -2.2 (+4.9% change from 2013) Fem Neck -2.3 Z(-5.2 % change from 2013) Proximal radius -3.4 (-11.3% change from 2013)  DEXA performed September 10, 2013 spine not performed due to surgery Total Hipt -2.5, osteoporosis, stable versus prior study of 2011 (-2.7) femoral neck -2.0, osteopenia, stable versus 2011 (-2.3) Proximal radius -2.4, osteopenia, no prior

## 2024-03-06 NOTE — PHYSICAL EXAM
[Alert] : alert [Well Nourished] : well nourished [Healthy Appearance] : healthy appearance [No Acute Distress] : no acute distress [Normal Voice/Communication] : normal voice communication [EOMI] : extra ocular movement intact [No Proptosis] : no proptosis [Supple] : the neck was supple [No Accessory Muscle Use] : no accessory muscle use [No Respiratory Distress] : no respiratory distress [Clear to Auscultation] : lungs were clear to auscultation bilaterally [Normal Rate and Effort] : normal respiratory rate and effort [Normal Rate] : heart rate was normal [Normal S1, S2] : normal S1 and S2 [No Edema] : no peripheral edema [Regular Rhythm] : with a regular rhythm [Not Tender] : non-tender [Normal Bowel Sounds] : normal bowel sounds [Soft] : abdomen soft [Not Distended] : not distended [Kyphosis] : kyphosis present [No Stigmata of Cushings Syndrome] : no stigmata of Cushings Syndrome [Oriented x3] : oriented to person, place, and time [No Involuntary Movements] : no involuntary movements were seen [Normal Affect] : the affect was normal [Normal Mood] : the mood was normal [de-identified] : +nodular thyroid gland

## 2024-03-06 NOTE — PROCEDURE
[STYLIGHT e 2008 model, 10-12 MHz frequencies] : multiple real time longitudinal and transverse images were obtained using a high resolution ultrasound with a linear transducer, STYLIGHT e 2008 model, 10-12 MHz frequencies. All measurements will be reported as longitudinal x sal-posterior x transverse. [Report dated ___] : Report dated [unfilled] [Thyroid Nodule] : thyroid nodule [] : a heterogeneous parenchyma [Lower] : lower pole there is a  [Peripheral vascularity] : peripheral vascularity [Mixed] : mixed [Heterogeneous] : heterogenous nodule [Peripheral and scant  internal vascularity] : peripheral and scant internal vascularity [Distinct] : distinct [Left Thyroid] : left [Hypoechoic] : hypoechoic nodule [Mid] : mid pole there is a  [Ovoid] : ovoid in shape [Right Thyroid] : right [Cyst] : cyst [Upper] : upper pole there is a  [Round] : round in shape [No] : does not have a halo [Smooth] : smooth [No calcifications] : no calcifications [No vascularity] : no vascularity [FreeTextEntry1] : 3.66 x 1.33 x 1.64 [FreeTextEntry5] : 4.12 x 1.21 x 1.68 [FreeTextEntry2] : 0.25 [FreeTextEntry4] : parathyroid adenoma posterior to R lobe 1.18 x 1.08 x 0.96 cm [FreeTextEntry3] : 0.75 x 0.56 x 0.68 [de-identified] : Additional bilateral subcentimeter cysts and nodules too numerous to characterize.

## 2024-03-06 NOTE — ASSESSMENT
[Carbohydrate Consistent Diet] : carbohydrate consistent diet [Importance of Diet and Exercise] : importance of diet and exercise to improve glycemic control, achieve weight loss and improve cardiovascular health [FreeTextEntry1] : 74-year-old F w/  1. B/L Thyroid nodules - Thyroid FNA of Left nodule (please refer to official US report) benign. Repeat focused thyroid U/S performed 8/2022 stable compared to 8/2021 stable compared to 1/2019 and stable compared to 2016 (see results section for full description). Clinically euthyroid. Repeat thyroid US today generally stable with some irregularity to the right and left nodules, recommend consider repeating FNA. Will schedule after GI procedure completed.   2. Osteoporosis - DXA repeated 6/2017 with results as noted above with stable/mild improvement in proximal radius T score of -3.1 from -3.4 (-11.3% change from 2013). s/p drug holiday for ~ 7 years. Started on prolia 1st dose 5/24/16. 2nd dose 11/29/16. 3rd Prolia 6/2017. 4th dose 12/2017. 5th dose 7/2018. 6th dose 1/2019. 7th dose of Prolia given 9/2019 (BUY AND BILL). 8th injection 1/2021. 9th injection 7/2021. 10th injection 2/2022. 11th injection 8/2022. 12th injection 2/2023. 13th injection 8/2023. 14th injection today. Last DEXA 2/2024 generally stable. Next DEXA 2/2026. If new fractures on Prolia therapy or worsening BMD would recommend switch treatment to Romozosumab. Monitor calcium and vitamin D levels.  3. Prediabetes-Lifestyle modification advised. Now off ozempic. Check A1c.   4. HTN- c/w chlorthalidone and losartan  5. HLD- continue with statin.

## 2024-03-06 NOTE — HISTORY OF PRESENT ILLNESS
[Prolia (Denosumab)] : Prolia (Denosumab) [Premat. Menopause (surg)] : premature menopause which occurred surgically [High Fall Risk] : high fall risk [Frequent Falls] : frequent falls [Uses a Walker/Cane] : use of a walker/cane [Regular Dental Follow-Up] : regular dental follow-up [History of Blood Clots] : history of blood clots [FreeTextEntry1] : 74-year-old F with PMH osteoporosis, thyroid nodules, prediabetes presents for follow up.  Osteoporosis: Seen in 2013 for osteoporosis. Told of osteopenia approximately in 2005. Was treated from 1950-5420 with Fosamax and then stopped due to esophagitis. DXA 2013 stable - drug holiday continued. No history of fractures. Mother did not have hip fx. Patient smoked 1-2 PPD x 20 yrs - quit 1982. No prolonged steroid use. No recent steroids. Premature menopause at age 33 s/p RAJIV for fibroids. She has a history of several orthopedic procedures including spinal fusion 1966 and multiple further spinal surgeries. History of left hip replacement 2010 and right shoulder replacement 2012 for arthritis.  Taking Vit D 2000 IU QD. Now taking calcium. Seen initially by me 5/2/16 with repeat DXA with significant decline in BMD of 1/3 radius from -2.4 in 2013 to -3.4. PTH not elevated, but parathyroid adenoma possible visualized on thyroid U/S. Recommended prolia s/p 1st injection 5/24/16, 2nd injection 11/2016. 3rd injection 6/2017. 4th injection 12/18/17. 5th injection 7/2/18. 6th injection 1/2019. 7th injection 9/2019. Was due for 8th injection 3/2020, but postponed due to COVID. 8th injection given 1/2021.  9th injection 7/2021. 10th injection 2/2022 after calcium repeated to ensure no hypocalcemia. 11th injection 8/2022, 12th injection 2/2023. Here for 13th injection. Has fallen since last visit with left finger fracture. Was following with dental. No upcoming dental work. Has hx of dental implants.  DXA 6/2017 total spine -2.1, 1/3 radius -3.1, Hip -2.1. DXA 1/2021 stable. Last DEXA 2/2024 stable.  Had right hip replacement 1/2022. +chronic back, LE, and hip pain.  Had fall recently without fracture.  Since last visit had repair of hiatal hernia with complication of vagus nerve involvement now with gastroparesis awaiting pyloric sphincterotomy. Now off Ozempic.   B/L Thyroid Nodules: Outpatient Thyroid US performed 3/2016 revealed B/L thyroid nodules, with heterogenous, hypoechoic solid nodule of the L midpole measuring 1.5 x 1.1 x 0.8cm;  heterogenous, hypoechoic solid nodule of the R mid-lower pole measuring 1.3 x 0.8 x 1.1 cm; and  heterogenous, hypoechoic solid nodule of the RLL measuring 1.3 x 1.4 x 1.0 cm.  Nodules were found incidentally on work up for falls. No personal h/o radiation to the head or neck. No h/o abnormal thyroid blood work. No voice changes. + dysphagia after paraesophageal hernia repair. Father had thyroid nodule - unknown if malignant. Seen initially 5/2016 with thyroid U/S confirming dominant left thyroid nodule. Recommended FNA performed 5/2016 with benign results. Repeat thyroid US 1/2019 and 8/2022 stable. Patient reports occasional hand tremors. Denies palpitations. Denies heat or cold intolerance. 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 Also with some constipation. No h/o of amiodarone or lithium use. No changes to the neck.   On chronic methadone for restless leg syndrome plans on coming off methadone.  [Taking Steroids] : no past or present history of taking steroids [Kidney Stones] : no history of kidney stones [Excessive Alcohol Intake] : no past or present history of excessive alcohol intake [Current Smoking___(ppd)] : not currently smoking [Family History of Breast Cancer] : no family history of breast cancer [Family History of Osteoporosis] : no family history of osteoporosis [Family History of Hip Fracture] : no family history of hip fracture [Hyperparathyroidism] : no hyperparathyroidism [History of Radiation Therapy] : no history of radiation therapy [Previous Fragility Fracture] : no previous fragility fracture

## 2024-03-07 ENCOUNTER — OUTPATIENT (OUTPATIENT)
Dept: OUTPATIENT SERVICES | Facility: HOSPITAL | Age: 74
LOS: 1 days | Discharge: HOME | End: 2024-03-07
Payer: MEDICARE

## 2024-03-07 ENCOUNTER — APPOINTMENT (OUTPATIENT)
Dept: GASTROENTEROLOGY | Facility: HOSPITAL | Age: 74
End: 2024-03-07

## 2024-03-07 DIAGNOSIS — Z98.890 OTHER SPECIFIED POSTPROCEDURAL STATES: Chronic | ICD-10-CM

## 2024-03-07 DIAGNOSIS — Z96.643 PRESENCE OF ARTIFICIAL HIP JOINT, BILATERAL: Chronic | ICD-10-CM

## 2024-03-07 DIAGNOSIS — Z98.49 CATARACT EXTRACTION STATUS, UNSPECIFIED EYE: Chronic | ICD-10-CM

## 2024-03-07 PROCEDURE — 43247 EGD REMOVE FOREIGN BODY: CPT | Mod: 59

## 2024-03-07 PROCEDURE — C1889: CPT

## 2024-03-07 PROCEDURE — 43236 UPPR GI SCOPE W/SUBMUC INJ: CPT | Mod: 59

## 2024-03-07 PROCEDURE — 43270 EGD LESION ABLATION: CPT

## 2024-03-07 PROCEDURE — 43497 TRANSORL LWR ESOPHGL MYOTOMY: CPT

## 2024-03-07 DEVICE — CLIP RESOLUTION 360 ULTRA 235CM 20/BX: Type: IMPLANTABLE DEVICE | Status: FUNCTIONAL

## 2024-03-07 DEVICE — CLIP HEMO INSTINCT PLUS ENDOSCOPIC: Type: IMPLANTABLE DEVICE | Status: FUNCTIONAL

## 2024-03-07 RX ORDER — LANSOPRAZOLE 30 MG/1
30 CAPSULE, DELAYED RELEASE ORAL DAILY
Qty: 30 | Refills: 2 | Status: DISCONTINUED | COMMUNITY
Start: 2023-12-22 | End: 2024-03-07

## 2024-03-07 RX ORDER — PANTOPRAZOLE SODIUM 40 MG/1
40 GRANULE, DELAYED RELEASE ORAL DAILY
Qty: 90 | Refills: 1 | Status: DISCONTINUED | COMMUNITY
Start: 2024-02-03 | End: 2024-03-07

## 2024-03-07 RX ORDER — DEXLANSOPRAZOLE 60 MG/1
60 CAPSULE, DELAYED RELEASE ORAL
Qty: 30 | Refills: 1 | Status: DISCONTINUED | COMMUNITY
Start: 2023-12-27 | End: 2024-03-07

## 2024-03-07 RX ORDER — PANTOPRAZOLE SODIUM 20 MG/1
40 TABLET, DELAYED RELEASE ORAL ONCE
Refills: 0 | Status: DISCONTINUED | OUTPATIENT
Start: 2024-03-07 | End: 2024-03-07

## 2024-03-14 ENCOUNTER — APPOINTMENT (OUTPATIENT)
Dept: PULMONOLOGY | Facility: CLINIC | Age: 74
End: 2024-03-14
Payer: MEDICARE

## 2024-03-14 ENCOUNTER — APPOINTMENT (OUTPATIENT)
Dept: ORTHOPEDIC SURGERY | Facility: CLINIC | Age: 74
End: 2024-03-14

## 2024-03-14 VITALS
HEART RATE: 65 BPM | SYSTOLIC BLOOD PRESSURE: 126 MMHG | BODY MASS INDEX: 35.73 KG/M2 | WEIGHT: 182 LBS | RESPIRATION RATE: 17 BRPM | DIASTOLIC BLOOD PRESSURE: 62 MMHG | OXYGEN SATURATION: 95 % | HEIGHT: 60 IN

## 2024-03-14 DIAGNOSIS — J30.9 ALLERGIC RHINITIS, UNSPECIFIED: ICD-10-CM

## 2024-03-14 DIAGNOSIS — R09.82 POSTNASAL DRIP: ICD-10-CM

## 2024-03-14 PROCEDURE — G2211 COMPLEX E/M VISIT ADD ON: CPT

## 2024-03-14 PROCEDURE — 99215 OFFICE O/P EST HI 40 MIN: CPT

## 2024-03-14 RX ORDER — ALBUTEROL SULFATE 90 UG/1
108 (90 BASE) INHALANT RESPIRATORY (INHALATION)
Qty: 1 | Refills: 3 | Status: ACTIVE | COMMUNITY
Start: 2024-03-14 | End: 1900-01-01

## 2024-03-14 NOTE — ASSESSMENT
[FreeTextEntry1] : From history, cough appears more likely from GERD, possible aspiration events. Postnasal drip also could be playing a role. Will efvaluate for lung dz and any effects of possible aspiration on lungs. Possible SILVANA.

## 2024-03-14 NOTE — HISTORY OF PRESENT ILLNESS
[Former] : former [TextBox_4] : Cough since Sept 2023. Found HH. Had surgery. Had vagal nerve injury. POEM done one week ago on 3/7/24.   Cough remains;  worse at night but present during the day. Worse when eating.  No wheeze. but some POLLARD. Sleeps sitting up. Clears throat constantly.  Sees GI. On pantoprazole. Was on sucralfate but had to stop it.   Has postnasal drip that is constant. On singulair.   Snoring, EDS.   BMI 35.   Quit smoking over 40 ya. [< 20 pack-years] : < 20 pack-years

## 2024-03-14 NOTE — PLAN
[TextEntry] : PFT to be done. CT chest to look for changes from aspiration. GI f/u re: GERD. Swallow eval should be considered. FLonase for drip. Sleep study. Weight loss. Further recommendations will come.

## 2024-03-18 ENCOUNTER — APPOINTMENT (OUTPATIENT)
Dept: NEUROLOGY | Facility: CLINIC | Age: 74
End: 2024-03-18

## 2024-03-21 NOTE — H&P PST ADULT - PROBLEM SELECTOR PLAN 2
sister The Caprini score indicates that this patient is at high risk for a VTE event (score 6 or greater). Surgical patients in this group will benefit from both pharmacologic prophylaxis and intermittent compression devices.  The surgical team will determine the balance between VTE risk and bleeding risk, and other clinical considerations

## 2024-03-26 ENCOUNTER — OUTPATIENT (OUTPATIENT)
Dept: OUTPATIENT SERVICES | Facility: HOSPITAL | Age: 74
LOS: 1 days | End: 2024-03-26
Payer: MEDICARE

## 2024-03-26 DIAGNOSIS — Z98.49 CATARACT EXTRACTION STATUS, UNSPECIFIED EYE: Chronic | ICD-10-CM

## 2024-03-26 DIAGNOSIS — G47.33 OBSTRUCTIVE SLEEP APNEA (ADULT) (PEDIATRIC): ICD-10-CM

## 2024-03-26 DIAGNOSIS — Z96.643 PRESENCE OF ARTIFICIAL HIP JOINT, BILATERAL: Chronic | ICD-10-CM

## 2024-03-26 DIAGNOSIS — Z98.890 OTHER SPECIFIED POSTPROCEDURAL STATES: Chronic | ICD-10-CM

## 2024-03-26 PROCEDURE — G0400: CPT | Mod: 26

## 2024-03-26 PROCEDURE — 95800 SLP STDY UNATTENDED: CPT

## 2024-04-03 RX ORDER — LEVOFLOXACIN 500 MG/1
500 TABLET, FILM COATED ORAL DAILY
Qty: 7 | Refills: 0 | Status: DISCONTINUED | COMMUNITY
Start: 2024-03-07 | End: 2024-04-03

## 2024-04-03 RX ORDER — PANTOPRAZOLE 40 MG/1
40 TABLET, DELAYED RELEASE ORAL DAILY
Qty: 90 | Refills: 3 | Status: ACTIVE | COMMUNITY
Start: 2024-03-07 | End: 1900-01-01

## 2024-04-09 ENCOUNTER — RX CHANGE (OUTPATIENT)
Age: 74
End: 2024-04-09

## 2024-04-09 RX ORDER — FLUTICASONE PROPIONATE 50 UG/1
50 SPRAY, METERED NASAL DAILY
Qty: 1 | Refills: 2 | Status: DISCONTINUED | COMMUNITY
Start: 2024-03-14 | End: 2024-04-09

## 2024-04-10 RX ORDER — FLUTICASONE PROPIONATE 50 UG/1
50 SPRAY, METERED NASAL
Qty: 48 | Refills: 1 | Status: ACTIVE | COMMUNITY
Start: 1900-01-01 | End: 1900-01-01

## 2024-04-16 ENCOUNTER — APPOINTMENT (OUTPATIENT)
Dept: GASTROENTEROLOGY | Facility: CLINIC | Age: 74
End: 2024-04-16
Payer: MEDICARE

## 2024-04-16 PROCEDURE — 99442: CPT | Mod: 93

## 2024-04-16 NOTE — ASSESSMENT
[FreeTextEntry1] : Shes had an excellent clinical response at the moment to the G POEM. She will get a repeat NM gastric emptying at 3 months. We will cont to follow. Discussed proper diet.

## 2024-04-16 NOTE — HISTORY OF PRESENT ILLNESS
[Home] : at home, [unfilled] , at the time of the visit. [Medical Office: ([facility])___] : at the medical office located in  [Verbal consent obtained from patient] : the patient, [unfilled] [FreeTextEntry1] : 74 s/p hernia repair with severe delayed emptying of the stomach. She is s/p G POEM. She is doing very well. GERD and nausea have decreased considerably. She is traveling now and eating most things.

## 2024-04-21 ENCOUNTER — NON-APPOINTMENT (OUTPATIENT)
Age: 74
End: 2024-04-21

## 2024-04-25 ENCOUNTER — APPOINTMENT (OUTPATIENT)
Dept: ORTHOPEDIC SURGERY | Facility: CLINIC | Age: 74
End: 2024-04-25
Payer: MEDICARE

## 2024-04-25 VITALS — HEIGHT: 60 IN | BODY MASS INDEX: 37.3 KG/M2 | WEIGHT: 190 LBS

## 2024-04-25 PROCEDURE — 99214 OFFICE O/P EST MOD 30 MIN: CPT

## 2024-04-29 ENCOUNTER — APPOINTMENT (OUTPATIENT)
Dept: CARDIOLOGY | Facility: CLINIC | Age: 74
End: 2024-04-29
Payer: MEDICARE

## 2024-04-29 ENCOUNTER — NON-APPOINTMENT (OUTPATIENT)
Age: 74
End: 2024-04-29

## 2024-04-29 VITALS
HEIGHT: 60 IN | WEIGHT: 194 LBS | OXYGEN SATURATION: 95 % | SYSTOLIC BLOOD PRESSURE: 150 MMHG | DIASTOLIC BLOOD PRESSURE: 78 MMHG | BODY MASS INDEX: 38.09 KG/M2

## 2024-04-29 VITALS — DIASTOLIC BLOOD PRESSURE: 70 MMHG | SYSTOLIC BLOOD PRESSURE: 142 MMHG

## 2024-04-29 DIAGNOSIS — E78.5 HYPERLIPIDEMIA, UNSPECIFIED: ICD-10-CM

## 2024-04-29 PROCEDURE — 93000 ELECTROCARDIOGRAM COMPLETE: CPT

## 2024-04-29 PROCEDURE — 99213 OFFICE O/P EST LOW 20 MIN: CPT

## 2024-04-29 NOTE — PLAN
[TextEntry] :  All medications and supplements reviewed and verified with patient; no changes. Continue to monitor blood pressure at home Hydration encouraged Measured postural change; reminded to exercise marching in place before standing with alternating knee lifts Activity encouraged as needed pain permits Follow-up 6 months .

## 2024-04-29 NOTE — HISTORY OF PRESENT ILLNESS
[FreeTextEntry1] : Presents in follow-up reporting that she has been feeling well.  Just returned from vacation in [place] where she was compelled to use a wheelchair because of right knee pain (patellar fracture) her only other concern is unexplained 30 pound weight gain over the past 3 months  No c/o chest, throat,jaw, arm or upper back discomfort.  No dyspnea, orthopnea or PND.  No palpitations, dizziness or syncope.  No edema or claudication.

## 2024-04-29 NOTE — ASSESSMENT
[FreeTextEntry1] : Status post laparoscopic hernia repair followed by the development of esophageal ulcer and the finding of delayed gastric emptying.  Mild sinus tachycardia has resolved.    Hypertension-fair but not ideal control Weight gain Hyperlipidemia

## 2024-05-01 ENCOUNTER — APPOINTMENT (OUTPATIENT)
Dept: MRI IMAGING | Facility: CLINIC | Age: 74
End: 2024-05-01
Payer: MEDICARE

## 2024-05-01 PROCEDURE — 73721 MRI JNT OF LWR EXTRE W/O DYE: CPT | Mod: RT

## 2024-05-07 ENCOUNTER — APPOINTMENT (OUTPATIENT)
Dept: ORTHOPEDIC SURGERY | Facility: CLINIC | Age: 74
End: 2024-05-07
Payer: MEDICARE

## 2024-05-07 VITALS — WEIGHT: 194 LBS | BODY MASS INDEX: 38.09 KG/M2 | HEIGHT: 60 IN

## 2024-05-07 DIAGNOSIS — M25.561 PAIN IN RIGHT KNEE: ICD-10-CM

## 2024-05-07 DIAGNOSIS — Z96.651 PRESENCE OF RIGHT ARTIFICIAL KNEE JOINT: ICD-10-CM

## 2024-05-07 PROCEDURE — 73564 X-RAY EXAM KNEE 4 OR MORE: CPT | Mod: RT

## 2024-05-07 PROCEDURE — 99214 OFFICE O/P EST MOD 30 MIN: CPT

## 2024-05-07 NOTE — ED ADULT NURSE NOTE - NS ED NURSE LEVEL OF CONSCIOUSNESS MENTAL STATUS
"Yobany Small is a 66 y.o. 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  Met with patient to introduce myself, role and discuss discharge planning.  Patient lives with spouse.  Independent in all adl's.  Requires no assist devices for mobility.  Patient denies active home care, but is agreeable to home care if needed at discharge.  Patient denies any recent falls.  Patient feel safe at home and denies any issues with making follow up appointments or getting his medications.  Patient denies having a PCP.  PCP:  N/A  Pharmacy:  [facility]  Home Care: N/A  DME:  N/A      Physical Exam    Last Recorded Vitals  Blood pressure 149/84, pulse 93, temperature 36.8 °C (98.2 °F), temperature source Temporal, resp. rate 16, height 1.803 m (5' 11\"), weight 67.4 kg (148 lb 9.4 oz), SpO2 92%.  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

## 2024-05-08 ENCOUNTER — APPOINTMENT (OUTPATIENT)
Age: 74
End: 2024-05-08
Payer: MEDICARE

## 2024-05-08 VITALS
HEIGHT: 60 IN | BODY MASS INDEX: 37.3 KG/M2 | DIASTOLIC BLOOD PRESSURE: 72 MMHG | OXYGEN SATURATION: 93 % | SYSTOLIC BLOOD PRESSURE: 119 MMHG | WEIGHT: 190 LBS | HEART RATE: 87 BPM

## 2024-05-08 DIAGNOSIS — S82.031D DISPLACED TRANSVERSE FRACTURE OF RIGHT PATELLA, SUBSEQUENT ENCOUNTER FOR CLOSED FRACTURE WITH ROUTINE HEALING: ICD-10-CM

## 2024-05-08 PROCEDURE — 99204 OFFICE O/P NEW MOD 45 MIN: CPT

## 2024-05-09 NOTE — HISTORY OF PRESENT ILLNESS
[de-identified] : This is very nice 74-year-old female experiencing right knee pain, which is severe in intensity.  No recent trauma.  No fall.  [facility]  She went to [facility] recently diagnosed with a displaced patella fracture.  The extensor mechanism was found to be intact.  I gave her a range of motion knee brace with side hinges which has not helped.  Not using cane or walker.  She is able to do her activities.  Not taking pain medication.  Knee is not giving way.  The patient denies any radiation of the pain to the feet and it is not associated with numbness, tingling, or weakness.

## 2024-05-09 NOTE — DISCUSSION/SUMMARY
[de-identified] : This patient has a displaced patella fracture however her extensor mechanism is intact she is able to straight leg raise.  While it is not maltracking the distal pole of the patella is rubbing about the metal implant and so she may be candidate for reconstructive surgery for this indication and to avoid failure of the patella in the future as opposed to the reasons of extensor mechanism disruption as her extensor mechanism is intact.  An extensive conversation with the patient about this.  Referred to my trauma partner who may be able to help with this reconstructive surgery as I do not do, surgery.  She is instructed to use a knee immobilizer and told to follow-up.

## 2024-05-09 NOTE — PHYSICAL EXAM
[de-identified] : Patient is well nourished, well-developed, in no acute distress, with appropriate mood and affect. The patient is oriented to time, place, and person. Respirations are even and unlabored. Gait evaluation does reveal a limp. There is no inguinal adenopathy. Examination of the contralateral knee shows normal range of motion, strength, no tenderness, and intact skin. The affected limb is well-perfused, without skin lesions, shows a grossly normal motor and sensory examination. Knee motion is significantly reduced and does cause significant pain. The knee moves from 0 to 125 degrees.  Able to straight leg raise to 0 degrees.  No instability.  Tender to palpation of the anterior knee and defect noted about the patella.  The knee is stable within that range-of-motion to AP and ML stress. The alignment of the knee is neutral.  Well-healed midline surgical scar.  Muscle strength is normal. Pedal pulses are palpable. Hip examination was negative. [de-identified] : AP, lateral, tunnel, and sunrise knee x-rays of the right knee were ordered and obtained in the office and demonstrate satisfactory position and alignment of the components are present.  A transverse patella fracture is noted with displacement of the patella bone.  The patella appears to be centrally located and tracking well on the sunrise view.

## 2024-05-13 ENCOUNTER — NON-APPOINTMENT (OUTPATIENT)
Age: 74
End: 2024-05-13

## 2024-05-21 ENCOUNTER — APPOINTMENT (OUTPATIENT)
Dept: ORTHOPEDIC SURGERY | Facility: CLINIC | Age: 74
End: 2024-05-21
Payer: MEDICARE

## 2024-05-21 VITALS — WEIGHT: 190 LBS | HEIGHT: 60 IN | BODY MASS INDEX: 37.3 KG/M2

## 2024-05-21 DIAGNOSIS — I10 ESSENTIAL (PRIMARY) HYPERTENSION: ICD-10-CM

## 2024-05-21 PROCEDURE — 99213 OFFICE O/P EST LOW 20 MIN: CPT

## 2024-05-23 ENCOUNTER — APPOINTMENT (OUTPATIENT)
Dept: GASTROENTEROLOGY | Facility: CLINIC | Age: 74
End: 2024-05-23
Payer: MEDICARE

## 2024-05-23 ENCOUNTER — RX RENEWAL (OUTPATIENT)
Age: 74
End: 2024-05-23

## 2024-05-23 ENCOUNTER — APPOINTMENT (OUTPATIENT)
Dept: ORTHOPEDIC SURGERY | Facility: CLINIC | Age: 74
End: 2024-05-23

## 2024-05-23 VITALS
HEART RATE: 84 BPM | SYSTOLIC BLOOD PRESSURE: 133 MMHG | HEIGHT: 60 IN | DIASTOLIC BLOOD PRESSURE: 82 MMHG | BODY MASS INDEX: 37.3 KG/M2 | OXYGEN SATURATION: 96 % | WEIGHT: 190 LBS

## 2024-05-23 DIAGNOSIS — K21.9 GASTRO-ESOPHAGEAL REFLUX DISEASE W/OUT ESOPHAGITIS: ICD-10-CM

## 2024-05-23 DIAGNOSIS — K31.84 GASTROPARESIS: ICD-10-CM

## 2024-05-23 PROCEDURE — G2211 COMPLEX E/M VISIT ADD ON: CPT

## 2024-05-23 PROCEDURE — 99214 OFFICE O/P EST MOD 30 MIN: CPT

## 2024-05-23 NOTE — ASSESSMENT
[FreeTextEntry1] : 74-year-old female with prior fundoplication 22 years ago, with redo fundoplication November 2023, complicated by new onset of gastroparesis, likely related to fundoplication status post G POEM 3/2024, with no improvement in chronic throat clearing and cough symptoms.  I suspect that she may have esophageal dysmotility or recurrent acid reflux given history of esophageal ulcers.  I recommend that she repeat her nuclear medicine gastric emptying scan, which she will have next week.  I also recommend that she undergo esophageal manometry with 24-hour pH P off of antacids.  She will do this in mid June.  1.  Follow-up NM GES  2.  Esophageal manometry +24-hour pH P off of antacid therapy The risks, benefits and alternatives of the procedure were discussed with the patient in detail. Risks include nasal irritation, bleeding, coughing, sore throat and sinusitis. All questions were answered. The patient expressed understanding of these risks and is agreeable to proceed.  I  instructed the patient to discontinue his/her PPI x 7 days, H2B x 3 days prior to procedure.  Patient understands that he/she will be wearing a transnasal catheter for 24 hours.  The risks, benefits and alternatives of the procedure were discussed with the patient in detail. Risks include nasal irritation, bleeding, coughing, sore throat and sinusitis. All questions were answered. The patient expressed understanding of these risks and is agreeable to proceed.

## 2024-05-23 NOTE — REASON FOR VISIT
[Follow-up] : a follow-up of an existing diagnosis [Family Member] : family member [Other: _____] : [unfilled]

## 2024-05-23 NOTE — HISTORY OF PRESENT ILLNESS
[FreeTextEntry1] : 74-year-old pleasant female with history of HTN/paraesophageal/intrathoracic stomach status post fundoplication 22 years ago, redo fundoplication in November 2023 for chronic cough here for follow-up care.  Nuclear medicine prior to redo fundoplication demonstrated 10% retention of food contents at 4 hours Barium esophagram October 2023 demonstrated moderate size hiatal hernia No esophageal manometry performed preoperatively Repeat nuclear medicine gastric emptying scan in December 2023 demonstrated 94% retention of solids at 4 hours,  burning in her throat  She is status post G POEM by Dr. Goncalves been used in March 2024 She denies any regurgitation/nausea/vomiting.  She does feel constant hunger.  She has gained 30 pounds.  She mainly notices significant chronic throat clearing as well as coughing, this also keeps her up at night.  When she is straining to have a bowel movement, she will feel a burning sensation in her throat consistent with reflux.  She is taking pantoprazole 40 mg in the morning, famotidine as needed which she takes intermittently  In January 2024, she had a upper endoscopy with me  GE flap valve was low grade 2, partial fundoplication found in the cardia, wrap appeared intact and it was traversed Endoflip demonstrated distensibility index of 3.3 with an esophageal diameter of 15.5 at pressures of 57, at balloon volumes of 60, no RAC pattern seen

## 2024-05-28 ENCOUNTER — APPOINTMENT (OUTPATIENT)
Age: 74
End: 2024-05-28
Payer: MEDICARE

## 2024-05-28 ENCOUNTER — RESULT REVIEW (OUTPATIENT)
Age: 74
End: 2024-05-28

## 2024-05-28 PROCEDURE — 76376 3D RENDER W/INTRP POSTPROCES: CPT

## 2024-05-28 PROCEDURE — 73700 CT LOWER EXTREMITY W/O DYE: CPT | Mod: RT

## 2024-05-29 ENCOUNTER — APPOINTMENT (OUTPATIENT)
Dept: ORTHOPEDIC SURGERY | Facility: CLINIC | Age: 74
End: 2024-05-29
Payer: MEDICARE

## 2024-05-29 VITALS — HEIGHT: 60 IN | BODY MASS INDEX: 37.3 KG/M2 | WEIGHT: 190 LBS

## 2024-05-29 DIAGNOSIS — M76.62 ACHILLES TENDINITIS, LEFT LEG: ICD-10-CM

## 2024-05-29 PROCEDURE — 99204 OFFICE O/P NEW MOD 45 MIN: CPT

## 2024-05-29 RX ORDER — DICLOFENAC SODIUM 1% 10 MG/G
1 GEL TOPICAL
Qty: 1 | Refills: 1 | Status: ACTIVE | COMMUNITY
Start: 2024-05-29 | End: 1900-01-01

## 2024-05-29 NOTE — HISTORY OF PRESENT ILLNESS
[de-identified] : 74 year old female presents with 6 weeks of left heel pain.  Patient was on vacation and when this issue started, she was doing lots of walking.  There is no injury or trauma at onset.  Patient was seen by her knee replacement surgeon as she is having right knee pain after her recent vacation, she is diagnosed with a fracture of the patella.  She is undergoing further workup and treatment for that issue.  No workup or treatment for this left heel pain as of yet.  No numbness/tingling, no radiation of pain.

## 2024-05-29 NOTE — PHYSICAL EXAM
[de-identified] : Constitutional: Well-nourished, well-developed, No acute distress Respiratory:  Good respiratory effort, no SOB Lymphatic: No regional lymphadenopathy, no lymphedema Psychiatric: Pleasant and normal affect, alert and oriented x3 Musculoskeletal: normal except where as noted in regional exam  Left ankle: APPEARANCE: Mild swelling of posterior ankle in midsubstance of achilles tendon, no marked deformities or malalignment POSITIVE TENDERNESS: achilles tendon, + thickening of tendon NONTENDER: medial malleolus, lateral malleolus, tibialis posterior tendon, ATFL, CFL, PTFL, anterior tibiofibular ligament (high ankle), sinus tarsus, deltoid ligaments, 5th metatarsal.  RANGE OF MOTION: Mildly limited DF due to stiffness and pain in achilles tendon.  RESISTIVE TESTING: painless resisted dorsiflexion, plantar flexion, inversion & eversion.  SPECIAL TESTS: neg Vinson.  neg anterior drawer. neg talar tilt. neg Kleiger's NEURO: Normal sensation of LE, DTRs 2+/4 patella and achilles PULSES: 2+ DP/PT pulses

## 2024-05-29 NOTE — DISCUSSION/SUMMARY
[de-identified] : Discussed findings of today's exam and possible causes of patient's pain.  Educated patient on their most probable diagnosis of chronic intermittent left heel pain with recent atraumatic exacerbation due to insertional Achilles tendinitis.  Reviewed possible courses of treatment, and we collaboratively decided best course of treatment at this time will include conservative management.  Patient started on a course of topical NSAIDs, prescription given for diclofenac gel 1% to be applied to the area of pain 2-3 times daily as needed (We discussed all possible side effects of this medication).  Patient will be started on a course of physical therapy to restore normal range of motion and strength as tolerated.  Patient advised to  over-the-counter gel heel cups to be worn during the day for support.  Patient has an upcoming vacation where she will be traveling, she is looking for some supportive device.  Patient advised that we can provide a cam walker boot to have while she is on vacation, she will not likely require this for much time beyond her vacation, but she can utilize this as ambulatory assist device.  Follow up as needed.  Patient appreciates and agrees with current plan.  This note was generated using dragon medical dictation software.  A reasonable effort has been made for proofreading its contents, but typos may still remain.  If there are any questions or points of clarification needed please notify my office.

## 2024-05-31 ENCOUNTER — APPOINTMENT (OUTPATIENT)
Dept: NUCLEAR MEDICINE | Facility: HOSPITAL | Age: 74
End: 2024-05-31

## 2024-05-31 ENCOUNTER — APPOINTMENT (OUTPATIENT)
Dept: NUCLEAR MEDICINE | Facility: HOSPITAL | Age: 74
End: 2024-05-31
Payer: MEDICARE

## 2024-05-31 ENCOUNTER — RESULT REVIEW (OUTPATIENT)
Age: 74
End: 2024-05-31

## 2024-05-31 ENCOUNTER — OUTPATIENT (OUTPATIENT)
Dept: OUTPATIENT SERVICES | Facility: HOSPITAL | Age: 74
LOS: 1 days | End: 2024-05-31

## 2024-05-31 DIAGNOSIS — Z98.890 OTHER SPECIFIED POSTPROCEDURAL STATES: Chronic | ICD-10-CM

## 2024-05-31 DIAGNOSIS — K31.84 GASTROPARESIS: ICD-10-CM

## 2024-05-31 DIAGNOSIS — Z96.643 PRESENCE OF ARTIFICIAL HIP JOINT, BILATERAL: Chronic | ICD-10-CM

## 2024-05-31 DIAGNOSIS — Z98.49 CATARACT EXTRACTION STATUS, UNSPECIFIED EYE: Chronic | ICD-10-CM

## 2024-05-31 PROCEDURE — 78264 GASTRIC EMPTYING IMG STUDY: CPT | Mod: 26,MH

## 2024-06-06 ENCOUNTER — NON-APPOINTMENT (OUTPATIENT)
Age: 74
End: 2024-06-06

## 2024-06-06 ENCOUNTER — APPOINTMENT (OUTPATIENT)
Dept: ORTHOPEDIC SURGERY | Facility: HOSPITAL | Age: 74
End: 2024-06-06

## 2024-06-06 ENCOUNTER — APPOINTMENT (OUTPATIENT)
Age: 74
End: 2024-06-06
Payer: MEDICARE

## 2024-06-06 PROCEDURE — G2012 BRIEF CHECK IN BY MD/QHP: CPT

## 2024-06-13 LAB
ALBUMIN SERPL ELPH-MCNC: 4.5 G/DL
ALP BLD-CCNC: 170 U/L
ALT SERPL-CCNC: 35 U/L
ANION GAP SERPL CALC-SCNC: 15 MMOL/L
AST SERPL-CCNC: 22 U/L
BILIRUB SERPL-MCNC: 0.5 MG/DL
BUN SERPL-MCNC: 32 MG/DL
CALCIUM SERPL-MCNC: 9.9 MG/DL
CHLORIDE SERPL-SCNC: 100 MMOL/L
CO2 SERPL-SCNC: 26 MMOL/L
CREAT SERPL-MCNC: 0.96 MG/DL
EGFR: 61 ML/MIN/1.73M2
GLUCOSE SERPL-MCNC: 100 MG/DL
HCT VFR BLD CALC: 38.5 %
HGB BLD-MCNC: 12.4 G/DL
MCHC RBC-ENTMCNC: 27 PG
MCHC RBC-ENTMCNC: 32.2 GM/DL
MCV RBC AUTO: 83.9 FL
PLATELET # BLD AUTO: 284 K/UL
POTASSIUM SERPL-SCNC: 4.2 MMOL/L
PROT SERPL-MCNC: 7 G/DL
RBC # BLD: 4.59 M/UL
RBC # FLD: 16.3 %
SODIUM SERPL-SCNC: 141 MMOL/L
WBC # FLD AUTO: 6.04 K/UL

## 2024-06-17 NOTE — ASSESSMENT
[FreeTextEntry1] : 74-year-old female with a displaced right patella fracture atraumatic with extensor mechanism intact - I had a long discussion with the patient about her injury and exam findings and imaging findings.  Discussed with patient that she does indeed have a patella fracture.  On x-rays it does appear that her patella component is well-fixed does not appear to be loose.  She is having pain however discussed with patient that the fact that her extensor mechanism is intact is a good thing.  Discussed with patient that I would recommend treating conservatively at this point giving her extensor mechanism is intact.  Discussed with patient that any operative intervention may indeed make her range of motion of knee worse.  This discussed in detail.  Patient agrees with plan.  This point we will continue with conservative management.  Patient was given referral for physical therapy.  Patient to follow-up in 3 to 6 weeks.

## 2024-06-17 NOTE — PHYSICAL EXAM
[de-identified] : Right lower extremity - Skin intact previous surgical incision healed - Patient able to range her knee from 0 to 125 degrees  -Able to form straight leg raise - Sensation tact light touch distally - Leg warm well-perfused [de-identified] : Previously obtained x-rays were reviewed demonstrate transverse patella fracture with displacement.  My depend interpretation

## 2024-06-17 NOTE — HISTORY OF PRESENT ILLNESS
[de-identified] : 74-year-old female presents complaining of right knee pain.  She was referred to me by my colleague Dr. Yo.  Patient has a history of right total knee arthroplasty in 2021 done at [facility].  She has been having increasing right knee pain for the last 4 months.  She denies any trauma or fall.  She recently had an MRI completed that showed a patella fracture.  Patient has pain mostly in the anterior aspect of her knee.  Pain is worse with bending of the knee.

## 2024-06-18 ENCOUNTER — APPOINTMENT (OUTPATIENT)
Dept: GASTROENTEROLOGY | Facility: HOSPITAL | Age: 74
End: 2024-06-18

## 2024-06-18 ENCOUNTER — OUTPATIENT (OUTPATIENT)
Dept: OUTPATIENT SERVICES | Facility: HOSPITAL | Age: 74
LOS: 1 days | Discharge: HOME | End: 2024-06-18
Payer: MEDICARE

## 2024-06-18 ENCOUNTER — RX RENEWAL (OUTPATIENT)
Age: 74
End: 2024-06-18

## 2024-06-18 VITALS
WEIGHT: 186.95 LBS | RESPIRATION RATE: 13 BRPM | SYSTOLIC BLOOD PRESSURE: 120 MMHG | DIASTOLIC BLOOD PRESSURE: 72 MMHG | TEMPERATURE: 98 F | HEIGHT: 60 IN | OXYGEN SATURATION: 98 % | HEART RATE: 71 BPM

## 2024-06-18 DIAGNOSIS — Z98.890 OTHER SPECIFIED POSTPROCEDURAL STATES: Chronic | ICD-10-CM

## 2024-06-18 DIAGNOSIS — Z96.643 PRESENCE OF ARTIFICIAL HIP JOINT, BILATERAL: Chronic | ICD-10-CM

## 2024-06-18 DIAGNOSIS — Z98.49 CATARACT EXTRACTION STATUS, UNSPECIFIED EYE: Chronic | ICD-10-CM

## 2024-06-18 DIAGNOSIS — K21.9 GASTRO-ESOPHAGEAL REFLUX DISEASE WITHOUT ESOPHAGITIS: ICD-10-CM

## 2024-06-18 PROCEDURE — 91038 ESOPH IMPED FUNCT TEST > 1HR: CPT | Mod: 26

## 2024-06-18 DEVICE — CATH VERSAFLEX Z PH: Type: IMPLANTABLE DEVICE | Status: FUNCTIONAL

## 2024-06-18 RX ORDER — CELECOXIB 100 MG/1
100 CAPSULE ORAL TWICE DAILY
Qty: 60 | Refills: 3 | Status: ACTIVE | COMMUNITY
Start: 2024-03-12 | End: 1900-01-01

## 2024-06-18 RX ORDER — NALTREXONE HYDROCHLORIDE AND BUPROPION HYDROCHLORIDE 8; 90 MG/1; MG/1
8-90 TABLET, EXTENDED RELEASE ORAL
Qty: 60 | Refills: 1 | Status: ACTIVE | COMMUNITY
Start: 2024-05-06 | End: 1900-01-01

## 2024-06-20 NOTE — PHYSICAL EXAM
[de-identified] : Right lower extremity - Skin intact previous surgical incision healed - Patient able to range her knee from 0 to 125 degrees  -Able to form straight leg raise - Sensation tact light touch distally - Leg warm well-perfused

## 2024-06-20 NOTE — ASSESSMENT
[FreeTextEntry1] : 74-year-old female with a displaced right patella fracture atraumatic with extensor mechanism intact - Patient continues to have pain, pain has not worsened. Recommend CT scan to further evaluate the fracture and the integrity of the patella prosthesis. CT ordered today. Patient to continue PT, WBAT RLE, no brace - f/u after CT completed

## 2024-06-20 NOTE — HISTORY OF PRESENT ILLNESS
[de-identified] : 74-year-old female presents complaining of right knee pain.  She was referred to me by my colleague Dr. Yo.  Patient has a history of right total knee arthroplasty in 2021 done at [facility].  She has been having increasing right knee pain for the last 4 months.  She denies any trauma or fall.  She recently had an MRI completed that showed a patella fracture.  Patient has pain mostly in the anterior aspect of her knee.  Pain is worse with bending of the knee.    5/21- pain has not worsened, in PT

## 2024-06-25 ENCOUNTER — APPOINTMENT (OUTPATIENT)
Dept: ORTHOPEDIC SURGERY | Facility: CLINIC | Age: 74
End: 2024-06-25

## 2024-06-25 DIAGNOSIS — M18.11 UNILATERAL PRIMARY OSTEOARTHRITIS OF FIRST CARPOMETACARPAL JOINT, RIGHT HAND: ICD-10-CM

## 2024-06-25 DIAGNOSIS — G56.02 CARPAL TUNNEL SYNDROME, LEFT UPPER LIMB: ICD-10-CM

## 2024-06-25 DIAGNOSIS — M18.12 UNILATERAL PRIMARY OSTEOARTHRITIS OF FIRST CARPOMETACARPAL JOINT, LEFT HAND: ICD-10-CM

## 2024-06-25 PROCEDURE — 73130 X-RAY EXAM OF HAND: CPT | Mod: 50

## 2024-06-25 PROCEDURE — 20605 DRAIN/INJ JOINT/BURSA W/O US: CPT | Mod: LT

## 2024-06-25 PROCEDURE — 99204 OFFICE O/P NEW MOD 45 MIN: CPT | Mod: 25

## 2024-07-01 ENCOUNTER — APPOINTMENT (OUTPATIENT)
Dept: PULMONOLOGY | Facility: CLINIC | Age: 74
End: 2024-07-01
Payer: MEDICARE

## 2024-07-01 VITALS
SYSTOLIC BLOOD PRESSURE: 128 MMHG | DIASTOLIC BLOOD PRESSURE: 66 MMHG | RESPIRATION RATE: 16 BRPM | OXYGEN SATURATION: 98 % | HEART RATE: 74 BPM

## 2024-07-01 VITALS — BODY MASS INDEX: 37.29 KG/M2 | HEIGHT: 59 IN | WEIGHT: 185 LBS

## 2024-07-01 DIAGNOSIS — G47.33 OBSTRUCTIVE SLEEP APNEA (ADULT) (PEDIATRIC): ICD-10-CM

## 2024-07-01 DIAGNOSIS — E66.9 OBESITY, UNSPECIFIED: ICD-10-CM

## 2024-07-01 DIAGNOSIS — R05.9 COUGH, UNSPECIFIED: ICD-10-CM

## 2024-07-01 DIAGNOSIS — K21.9 GASTRO-ESOPHAGEAL REFLUX DISEASE W/OUT ESOPHAGITIS: ICD-10-CM

## 2024-07-01 PROCEDURE — 94060 EVALUATION OF WHEEZING: CPT

## 2024-07-01 PROCEDURE — 99214 OFFICE O/P EST MOD 30 MIN: CPT | Mod: 25

## 2024-07-02 ENCOUNTER — APPOINTMENT (OUTPATIENT)
Dept: ORTHOPEDIC SURGERY | Facility: CLINIC | Age: 74
End: 2024-07-02
Payer: MEDICARE

## 2024-07-02 DIAGNOSIS — S82.031G: ICD-10-CM

## 2024-07-02 PROCEDURE — 99213 OFFICE O/P EST LOW 20 MIN: CPT

## 2024-07-06 ENCOUNTER — RX RENEWAL (OUTPATIENT)
Age: 74
End: 2024-07-06

## 2024-07-23 ENCOUNTER — APPOINTMENT (OUTPATIENT)
Dept: CT IMAGING | Facility: CLINIC | Age: 74
End: 2024-07-23
Payer: MEDICARE

## 2024-07-23 PROCEDURE — 71250 CT THORAX DX C-: CPT

## 2024-07-25 ENCOUNTER — APPOINTMENT (OUTPATIENT)
Dept: GASTROENTEROLOGY | Facility: CLINIC | Age: 74
End: 2024-07-25

## 2024-07-29 ENCOUNTER — APPOINTMENT (OUTPATIENT)
Dept: MRI IMAGING | Facility: CLINIC | Age: 74
End: 2024-07-29
Payer: MEDICARE

## 2024-07-29 ENCOUNTER — RX RENEWAL (OUTPATIENT)
Age: 74
End: 2024-07-29

## 2024-07-29 PROCEDURE — 72141 MRI NECK SPINE W/O DYE: CPT | Mod: MH

## 2024-07-29 PROCEDURE — 72146 MRI CHEST SPINE W/O DYE: CPT | Mod: MH

## 2024-07-29 PROCEDURE — 72148 MRI LUMBAR SPINE W/O DYE: CPT | Mod: MH

## 2024-07-29 RX ORDER — ERGOCALCIFEROL 1.25 MG/1
1.25 MG CAPSULE, LIQUID FILLED ORAL
Qty: 12 | Refills: 3 | Status: ACTIVE | COMMUNITY
Start: 2024-07-29 | End: 1900-01-01

## 2024-08-02 ENCOUNTER — TRANSCRIPTION ENCOUNTER (OUTPATIENT)
Age: 74
End: 2024-08-02

## 2024-08-04 ENCOUNTER — NON-APPOINTMENT (OUTPATIENT)
Age: 74
End: 2024-08-04

## 2024-08-05 ENCOUNTER — APPOINTMENT (OUTPATIENT)
Dept: GASTROENTEROLOGY | Facility: CLINIC | Age: 74
End: 2024-08-05

## 2024-08-05 PROCEDURE — 99215 OFFICE O/P EST HI 40 MIN: CPT

## 2024-08-05 PROCEDURE — G2211 COMPLEX E/M VISIT ADD ON: CPT

## 2024-08-05 NOTE — ASSESSMENT
[FreeTextEntry1] : Assessment and Plan: - 0 : Gastroparesis: Haley French has a history of gastroparesis, which has improved after the GPOM procedure, with 49% retention of solids at four hours, compared to 94% in January. However, she continues to experience chronic throat clearing and cough symptoms. - Therapeutic Interventions: Start erythromycin 125 mg twice daily, two weeks on and one week off, to help improve gastric emptying. Increase pantoprazole to twice daily, 15 minutes before breakfast and dinner. Discontinue famotidine.  - Diagnostic Tests: EKG from April 2024 reviewed.  No evidence of QTC prolongation. Repeat EKG in two months while on erythromycin.  - Referrals: may need gastric stim, which we will discuss in more detail at next visit, depending on symptom improvement with macrolide.  Offered dietician consultation referral for gastroparesis diet, which she deferred.  - Patient Education: Educate Haley on the proper dosing and schedule of erythromycin, pantoprazole, and the importance of timing her meals appropriately.    - Follow-Up: Schedule a follow-up appointment in three months to reassess her symptoms and response to treatment.  -1: Constipation stop Senna  start bisacodyl

## 2024-08-05 NOTE — HISTORY OF PRESENT ILLNESS
[FreeTextEntry1] : - Chief Complaint (CC) : Chronic throat clearing and cough symptoms. - History of Present Illness : Haley French is a 74-year-old female patient born on February 9, 1950. She has a history of hypertension, a parasophageal and intrathoracic stomach, and underwent a fundoplication 22 years ago, followed by a redo fundoplication in November 2023, which was complicated by gastroparesis. In March 2024, she underwent a gastric per-oral endoscopic myotomy (GPOM) procedure performed by Dr. Jose Alejandro Aleman. During a follow-up office visit in May 2024, Haley continued to experience chronic throat clearing and cough symptoms. A nuclear medicine gastric emptying scan revealed normalization of her liquid emptying at 11 minutes, but her solid portion demonstrated 49% retention of solids at four hours, which was significantly improved from 94% in January. Additionally, she underwent a manometry and 24-hour pH study, which showed intact peristalsis but 24.9% AET despite fundo.  27.2% upright and 23.2% supine.  She is currently on [id number] QAM and famotidine 40 QPM.  - Past Medical History : Hypertension, paraesophageal and intrathoracic stomach, fundoplication surgery (22 years ago), redo fundoplication (November 2023, complicated by gastroparesis), gastric per-oral endoscopic myotomy (GPOM) (March 2024). - Past Surgical History : Fundoplication surgery (22 years ago), redo fundoplication (November 2023), gastric per-oral endoscopic myotomy (GPOM) (March 2024). - Family History : Not mentioned. - Social History : Not mentioned. - Review of Systems : Not mentioned. - Medications : Pantoprazole (taken in the morning), famotidine 40 mg (taken at night). - Allergies : Not mentioned. Objective: - Diagnostic Results : Nuclear medicine gastric emptying scan Normalization of liquid emptying at 11 minutes, but 49% retention of solids at four hours (significantly improved from 94% in January). Manometry and 24-hour pH study Intact peristalsis.

## 2024-08-07 NOTE — ED ADULT NURSE NOTE - DOES PATIENT HAVE ADVANCE DIRECTIVE
Patient called stating large clot passed while in shower. Patient states no pain cramping or additional bleeding. Fundus firm and midline. Patient instructed to call if any other clots or heavy bleeding occur.    unk

## 2024-08-12 ENCOUNTER — RX RENEWAL (OUTPATIENT)
Age: 74
End: 2024-08-12

## 2024-08-19 ENCOUNTER — RX RENEWAL (OUTPATIENT)
Age: 74
End: 2024-08-19

## 2024-08-23 ENCOUNTER — APPOINTMENT (OUTPATIENT)
Dept: ORTHOPEDIC SURGERY | Facility: CLINIC | Age: 74
End: 2024-08-23
Payer: MEDICARE

## 2024-08-23 VITALS — WEIGHT: 188 LBS | HEIGHT: 60 IN | BODY MASS INDEX: 36.91 KG/M2

## 2024-08-23 VITALS — WEIGHT: 190 LBS | BODY MASS INDEX: 37.11 KG/M2

## 2024-08-23 DIAGNOSIS — Z96.642 PRESENCE OF LEFT ARTIFICIAL HIP JOINT: ICD-10-CM

## 2024-08-23 DIAGNOSIS — M17.12 UNILATERAL PRIMARY OSTEOARTHRITIS, LEFT KNEE: ICD-10-CM

## 2024-08-23 PROCEDURE — 20610 DRAIN/INJ JOINT/BURSA W/O US: CPT | Mod: LT

## 2024-08-23 PROCEDURE — 73564 X-RAY EXAM KNEE 4 OR MORE: CPT | Mod: LT

## 2024-08-23 PROCEDURE — 99214 OFFICE O/P EST MOD 30 MIN: CPT | Mod: 25

## 2024-08-23 PROCEDURE — 73502 X-RAY EXAM HIP UNI 2-3 VIEWS: CPT | Mod: LT

## 2024-08-23 NOTE — REASON FOR VISIT
[Follow-Up Visit] : a follow-up visit for [Artificial Hip Joint] : an artificial hip joint [Osteoarthritis, Knee] : osteoarthritis of the knee

## 2024-08-23 NOTE — PHYSICAL EXAM
[de-identified] :  Patient is well nourished, well-developed, in no acute distress, with appropriate mood and affect. The patient is oriented to time, place, and person. Respirations are even and unlabored. Gait evaluation does reveal a limp. There is no inguinal adenopathy. Examination of the contralateral knee shows normal range of motion, strength, no tenderness, and intact skin. The left limb is well-perfused, without skin lesions, shows a grossly normal motor and sensory examination.  Examination of the left hip shows a well healed surgical scar. Hip motion is full and painless from 0-90 degrees extension to flexion, 20 degrees adduction and 20 degrees abduction, and 15 degrees internal and 30 degrees external rotation. Leg lengths are approximately equal. Both hips are stable and muscle strength is normal with good strength with resisted abduction and adduction. Left knee motion is significantly reduced and does cause significant pain. The knee moves from 0 to 125 degrees. The knee is stable within that range-of-motion to AP and ML stress. The alignment of the knee is 5 degrees varus. Muscle strength is normal. Pedal pulses are palpable.  [de-identified] :  AP pelvis, AP and lateral hip radiographs of the left hip were ordered and obtained in the office and demonstrate satisfactory position and alignment of the components are present. No signs of loosening are seen.  Long standing knee, AP knee, lateral knee, and patellar views of the left knee were ordered and taken in the office and demonstrate degenerative joint disease of the knee with joint space narrowing, osteophyte formation, and subchondral sclerosis.

## 2024-08-23 NOTE — DISCUSSION/SUMMARY
[de-identified] : This patient has a well-functioning left total of arthroplasty as well as left knee osteoarthritis.  The patient is not an appropriate candidate for surgical intervention at this time. An extensive discussion was conducted on the natural history of the disease and the variety of surgical and non-surgical options available to the patient including, but not limited to non-steroidal anti-inflammatory medications, steroid injections, physical therapy, maintenance of ideal body weight, and reduction of activity.  I recommend meloxicam for this diagnosis however she states that she cannot take NSAIDs because of gastric reflux.  Today we performed a left knee intra-articular cortisone injection.  Continue to use the walker. The patient will schedule an appointment as needed.  Informed consent for the left knee injection was obtained. All risks, benefits and alternatives were discussed. These included but were not limited to bleeding, infection, and allergic reaction.  All questions were answered. A time out was performed. The left knee was prepped and draped in sterile fashion. Using sterile technique, the left knee was injected with 80mg of Kenalog, 4cc of 1% lidocaine, 4cc of 0.25% marcaine using a 21-gauge needle. A sterile dressing was applied. Post injection instructions were reviewed. The patient tolerated the procedure well.

## 2024-08-23 NOTE — HISTORY OF PRESENT ILLNESS
[de-identified] : This is very nice 74-year-old female experiencing chronic left knee pain, which is severe in intensity.  She has known left knee osteoarthritis.  The pain substantially limits activities of daily living. Walking tolerance is reduced.  She also has a left total of arthroplasty performed by another surgeon.  No groin pain.  She has poor balance at baseline and uses a walker.  The patient denies any radiation of the pain to the feet and it is not associated with numbness, tingling, or weakness.

## 2024-09-09 ENCOUNTER — APPOINTMENT (OUTPATIENT)
Dept: ENDOCRINOLOGY | Facility: CLINIC | Age: 74
End: 2024-09-09
Payer: MEDICARE

## 2024-09-09 VITALS
OXYGEN SATURATION: 96 % | BODY MASS INDEX: 38.28 KG/M2 | HEIGHT: 60 IN | WEIGHT: 195 LBS | DIASTOLIC BLOOD PRESSURE: 79 MMHG | HEART RATE: 86 BPM | SYSTOLIC BLOOD PRESSURE: 151 MMHG

## 2024-09-09 DIAGNOSIS — M81.0 AGE-RELATED OSTEOPOROSIS W/OUT CURRENT PATHOLOGICAL FRACTURE: ICD-10-CM

## 2024-09-09 DIAGNOSIS — I10 ESSENTIAL (PRIMARY) HYPERTENSION: ICD-10-CM

## 2024-09-09 DIAGNOSIS — E04.2 NONTOXIC MULTINODULAR GOITER: ICD-10-CM

## 2024-09-09 DIAGNOSIS — R73.03 PREDIABETES.: ICD-10-CM

## 2024-09-09 DIAGNOSIS — E78.5 HYPERLIPIDEMIA, UNSPECIFIED: ICD-10-CM

## 2024-09-09 PROCEDURE — 10005 FNA BX W/US GDN 1ST LES: CPT

## 2024-09-09 PROCEDURE — 10006 FNA BX W/US GDN EA ADDL: CPT

## 2024-09-09 PROCEDURE — 99215 OFFICE O/P EST HI 40 MIN: CPT | Mod: 25

## 2024-09-09 NOTE — PHYSICAL EXAM
[Alert] : alert [Well Nourished] : well nourished [Healthy Appearance] : healthy appearance [No Acute Distress] : no acute distress [Normal Voice/Communication] : normal voice communication [EOMI] : extra ocular movement intact [No Proptosis] : no proptosis [Supple] : the neck was supple [No Respiratory Distress] : no respiratory distress [No Accessory Muscle Use] : no accessory muscle use [Normal Rate and Effort] : normal respiratory rate and effort [Clear to Auscultation] : lungs were clear to auscultation bilaterally [Normal S1, S2] : normal S1 and S2 [Normal Rate] : heart rate was normal [Regular Rhythm] : with a regular rhythm [No Edema] : no peripheral edema [Normal Bowel Sounds] : normal bowel sounds [Not Tender] : non-tender [Not Distended] : not distended [Soft] : abdomen soft [Kyphosis] : kyphosis present [No Stigmata of Cushings Syndrome] : no stigmata of Cushings Syndrome [No Involuntary Movements] : no involuntary movements were seen [Oriented x3] : oriented to person, place, and time [Normal Affect] : the affect was normal [Normal Mood] : the mood was normal [de-identified] : +nodular thyroid gland

## 2024-09-09 NOTE — DATA REVIEWED
[FreeTextEntry1] : Labs 6/2024: CBC normal CMP normal except glucose of 100 and ALKP 170  Focused Thyroid US 3/6/2024: Right Upper Nodule 1.64 x 1.24 x 1.18 with punctate foci; Right Lower Nodule 0.78 x 0.85 x 0.88; Right Parathyroid 1.16 x 0.73 x 0.84; Left Nodule appears to be 2 distinct nodules 0.97 x 0.75 x 0.77 with coarse calcification and smaller 0.5 cm nodule adjacent. Additional b/l subcentimeter cysts and nodules too numerous to characterize.   Labs 11/2023: Glucose 130 BMP otherwise normal Ca 9.7  Labs 3/2023: CMP normal except glucose of 124 ALKP 162  Labs 12/2022: Prolactin 0.7 TSH 1.87 CMP normal except glucose of 104 and ALKP 141 Testosterone <2.5  Focused Thyroid US 8/17/2022: Right Upper Nodule 1.52 x 1.32 x 0.77; Right Lower Nodule 0.78 x 0.85 x 0.88; Right Parathyroid 1.16 x 0.73 x 0.84; Left Nodule appears to be 2 distinct nodules 0.97 x 0.75 x 0.77 with coarse calcification and smaller 0.5 cm nodule adjacent. Additional b/l subcentimeter cysts and nodules too numerous to characterize.   Labs 7/2022: CBC normal Ca around 9.0 A1c 5.8%  Chol 227 HDL 91  CMP normal except sodium of 134, glucose 56 likely due to hemolysis, ALKP 129  Labs 1/2022: Ca 8.0  Focused Thyroid US 8/10/2021: Right Upper Nodule 1.42 x 1.21 x 0.78; Right Lower Nodule 0.87 x 0.83 x 0.86; Right Parathyroid 1.16 x 0.73 x 0.84; Left Nodule 1.28 x 0.77 x 1.05 with coarse calcification. Additional b/l subcentimeter cysts and nodules too numerous to characterize.   Labs 7/2021: CMP normal except glucose of 107 Vit D 26.6  Labs 10/2019: CBC normal  HDL 63 Chol 242 Trig 188 CMP normal TSH 1.67 A1c 5.7%  Labs 3/2019:   Thyroid Ultrasound Report 1/7/19:  Technique: multiple real time longitudinal and transverse images were obtained using a high resolution ultrasound with a linear transducer, [employer] e 2008 model, 10-12 MHz frequencies. All measurements will be reported as longitudinal x sal-posterior x transverse.  Comparison: Report dated 5/2016.   Indication: thyroid nodule.   Findings:  The right thyroid lobe measures 3.66 x 1.33 x 1.64 cm. The left thyroid lobe measures 4.12 x 1.21 x 1.68 cm. The isthmus measures 0.25 cm.  The right thyroid lobe has a heterogeneous parenchyma.  The left thyroid lobe has a heterogeneous parenchyma .    In the right mid pole there is a  mixed, heterogenous nodule. It measures 1.33 x 0.77 x 1.17 cm. The nodule is ovoid in shape and the border is distinct. It has peripheral vascularity.   In the right lower pole there is a  mixed. It measures 0.95 x 0.81 x 0.96 cm. The nodule is ovoid in shape and the border is distinct. It has peripheral vascularity.   In the left mid pole there is a  mixed, heterogenous nodule. It measures 1.11 x 1.03 x 0.94 cm. The nodule is ovoid in shape and the border is distinct. It has peripheral and scant internal vascularity.   In the left mid pole there is a  hypoechoic nodule. It measures 0.65 x 0.43 x 0.87 cm. The nodule is ovoid in shape and the border is smooth. The nodule does not have a halo. It demonstrates no calcifications. It has no vascularity. parathyroid adenoma posterior to R lobe 1.18 x 1.08 x 0.96 cm.   In the right upper pole there is a  cyst. It measures 0.75 x 0.56 x 0.68 cm. The nodule is round in shape and the border is smooth. The nodule does not have a halo. It demonstrates no calcifications. It has no vascularity.   Additional bilateral subcentimeter cysts and nodules too numerous to characterize.    Labs 9/2018: CBC normal CMP normal A1c 5.8% Ca 10 Corrected Ca 9.5  HDL 61 Choll 232 Trig 98 Vit D 43.2 Mg 1.9  Labs 7/2018: CBC normal A1c 5.7% CMP normal Ca 10 Corrected 9.6 PTH 46 TSH 2.26 Free T4 1.4 Phos 2.8 Vit D 39.8 Vit D 1,25 37.3  Labs 12/5/17: CBC normal Mg 1.9 Glucose 112 Ca 10 PTH 36 CMP otherwise normal Phos 3.5 Chol 228 Trig 243 HDL 59  TSH 1.67 Free T4 1.2 A1c 5.6% Vit D 30.9    Labs 4/2017: Glucose 107  HDL 76 Chol 245 Trig 205 Vit D 31.8 A1c 6.1%  Labs 11/2016: TSH 1.26 Free T4 1.3 CBC normal A1c 6.0% CMP normal except glucose of 115   FNA 5/31/16: Benign  Labs 5/2/16: PTH 33 Ca 10.2 TSH 1.36 Free T4 1.3  Thyroid US performed 5/2/16:  Indication: thyroid nodule.   Findings:  The right thyroid lobe measures 3.63 x 1.12 x 1.71 cm. The left thyroid lobe measures 3.73x 1.22 x 1.72 cm. The isthmus measures 0.29 cm.    In the right lower pole there is a  mixed, heterogenous nodule. It measures 1.29 x 0.71 x 1.1 cm. The nodule is ovoid in shape and the border is distinct. It has peripheral vascularity.   In the right lower pole there is a  mixed. It measures 0.83 0.52 x 0.70 cm. The nodule is ovoid in shape and the border is distinct. It has peripheral vascularity.   In the left lower pole there is a  mixed, heterogenous nodule. It measures 1.38 x 1.02 x 0.82 cm. The nodule is ovoid in shape and the border is distinct. It has peripheral and scant internal vascularity.   parathyroid adenoma posterior to R lobe 1.18 x 1.08 x 0.96 cm.   DEXA performed May 2,2016 spine not performed due to surgery Tot Hip -2.2 (+4.9% change from 2013) Fem Neck -2.3 Z(-5.2 % change from 2013) Proximal radius -3.4 (-11.3% change from 2013)  DEXA performed September 10, 2013 spine not performed due to surgery Total Hipt -2.5, osteoporosis, stable versus prior study of 2011 (-2.7) femoral neck -2.0, osteopenia, stable versus 2011 (-2.3) Proximal radius -2.4, osteopenia, no prior

## 2024-09-09 NOTE — DATA REVIEWED
[FreeTextEntry1] : Labs 6/2024: CBC normal CMP normal except glucose of 100 and ALKP 170  Focused Thyroid US 3/6/2024: Right Upper Nodule 1.64 x 1.24 x 1.18 with punctate foci; Right Lower Nodule 0.78 x 0.85 x 0.88; Right Parathyroid 1.16 x 0.73 x 0.84; Left Nodule appears to be 2 distinct nodules 0.97 x 0.75 x 0.77 with coarse calcification and smaller 0.5 cm nodule adjacent. Additional b/l subcentimeter cysts and nodules too numerous to characterize.   Labs 11/2023: Glucose 130 BMP otherwise normal Ca 9.7  Labs 3/2023: CMP normal except glucose of 124 ALKP 162  Labs 12/2022: Prolactin 0.7 TSH 1.87 CMP normal except glucose of 104 and ALKP 141 Testosterone <2.5  Focused Thyroid US 8/17/2022: Right Upper Nodule 1.52 x 1.32 x 0.77; Right Lower Nodule 0.78 x 0.85 x 0.88; Right Parathyroid 1.16 x 0.73 x 0.84; Left Nodule appears to be 2 distinct nodules 0.97 x 0.75 x 0.77 with coarse calcification and smaller 0.5 cm nodule adjacent. Additional b/l subcentimeter cysts and nodules too numerous to characterize.   Labs 7/2022: CBC normal Ca around 9.0 A1c 5.8%  Chol 227 HDL 91  CMP normal except sodium of 134, glucose 56 likely due to hemolysis, ALKP 129  Labs 1/2022: Ca 8.0  Focused Thyroid US 8/10/2021: Right Upper Nodule 1.42 x 1.21 x 0.78; Right Lower Nodule 0.87 x 0.83 x 0.86; Right Parathyroid 1.16 x 0.73 x 0.84; Left Nodule 1.28 x 0.77 x 1.05 with coarse calcification. Additional b/l subcentimeter cysts and nodules too numerous to characterize.   Labs 7/2021: CMP normal except glucose of 107 Vit D 26.6  Labs 10/2019: CBC normal  HDL 63 Chol 242 Trig 188 CMP normal TSH 1.67 A1c 5.7%  Labs 3/2019:   Thyroid Ultrasound Report 1/7/19:  Technique: multiple real time longitudinal and transverse images were obtained using a high resolution ultrasound with a linear transducer, [employer] e 2008 model, 10-12 MHz frequencies. All measurements will be reported as longitudinal x sal-posterior x transverse.  Comparison: Report dated 5/2016.   Indication: thyroid nodule.   Findings:  The right thyroid lobe measures 3.66 x 1.33 x 1.64 cm. The left thyroid lobe measures 4.12 x 1.21 x 1.68 cm. The isthmus measures 0.25 cm.  The right thyroid lobe has a heterogeneous parenchyma.  The left thyroid lobe has a heterogeneous parenchyma .    In the right mid pole there is a  mixed, heterogenous nodule. It measures 1.33 x 0.77 x 1.17 cm. The nodule is ovoid in shape and the border is distinct. It has peripheral vascularity.   In the right lower pole there is a  mixed. It measures 0.95 x 0.81 x 0.96 cm. The nodule is ovoid in shape and the border is distinct. It has peripheral vascularity.   In the left mid pole there is a  mixed, heterogenous nodule. It measures 1.11 x 1.03 x 0.94 cm. The nodule is ovoid in shape and the border is distinct. It has peripheral and scant internal vascularity.   In the left mid pole there is a  hypoechoic nodule. It measures 0.65 x 0.43 x 0.87 cm. The nodule is ovoid in shape and the border is smooth. The nodule does not have a halo. It demonstrates no calcifications. It has no vascularity. parathyroid adenoma posterior to R lobe 1.18 x 1.08 x 0.96 cm.   In the right upper pole there is a  cyst. It measures 0.75 x 0.56 x 0.68 cm. The nodule is round in shape and the border is smooth. The nodule does not have a halo. It demonstrates no calcifications. It has no vascularity.   Additional bilateral subcentimeter cysts and nodules too numerous to characterize.    Labs 9/2018: CBC normal CMP normal A1c 5.8% Ca 10 Corrected Ca 9.5  HDL 61 Choll 232 Trig 98 Vit D 43.2 Mg 1.9  Labs 7/2018: CBC normal A1c 5.7% CMP normal Ca 10 Corrected 9.6 PTH 46 TSH 2.26 Free T4 1.4 Phos 2.8 Vit D 39.8 Vit D 1,25 37.3  Labs 12/5/17: CBC normal Mg 1.9 Glucose 112 Ca 10 PTH 36 CMP otherwise normal Phos 3.5 Chol 228 Trig 243 HDL 59  TSH 1.67 Free T4 1.2 A1c 5.6% Vit D 30.9    Labs 4/2017: Glucose 107  HDL 76 Chol 245 Trig 205 Vit D 31.8 A1c 6.1%  Labs 11/2016: TSH 1.26 Free T4 1.3 CBC normal A1c 6.0% CMP normal except glucose of 115   FNA 5/31/16: Benign  Labs 5/2/16: PTH 33 Ca 10.2 TSH 1.36 Free T4 1.3  Thyroid US performed 5/2/16:  Indication: thyroid nodule.   Findings:  The right thyroid lobe measures 3.63 x 1.12 x 1.71 cm. The left thyroid lobe measures 3.73x 1.22 x 1.72 cm. The isthmus measures 0.29 cm.    In the right lower pole there is a  mixed, heterogenous nodule. It measures 1.29 x 0.71 x 1.1 cm. The nodule is ovoid in shape and the border is distinct. It has peripheral vascularity.   In the right lower pole there is a  mixed. It measures 0.83 0.52 x 0.70 cm. The nodule is ovoid in shape and the border is distinct. It has peripheral vascularity.   In the left lower pole there is a  mixed, heterogenous nodule. It measures 1.38 x 1.02 x 0.82 cm. The nodule is ovoid in shape and the border is distinct. It has peripheral and scant internal vascularity.   parathyroid adenoma posterior to R lobe 1.18 x 1.08 x 0.96 cm.   DEXA performed May 2,2016 spine not performed due to surgery Tot Hip -2.2 (+4.9% change from 2013) Fem Neck -2.3 Z(-5.2 % change from 2013) Proximal radius -3.4 (-11.3% change from 2013)  DEXA performed September 10, 2013 spine not performed due to surgery Total Hipt -2.5, osteoporosis, stable versus prior study of 2011 (-2.7) femoral neck -2.0, osteopenia, stable versus 2011 (-2.3) Proximal radius -2.4, osteopenia, no prior

## 2024-09-09 NOTE — ASSESSMENT
[Carbohydrate Consistent Diet] : carbohydrate consistent diet [Importance of Diet and Exercise] : importance of diet and exercise to improve glycemic control, achieve weight loss and improve cardiovascular health [FreeTextEntry1] : 74-year-old F w/  1. B/L Thyroid nodules - Thyroid FNA of Left nodule (please refer to official US report) benign. Repeat focused thyroid U/S performed 8/2022 stable compared to 8/2021 stable compared to 1/2019 and stable compared to 2016 (see results section for full description). Clinically euthyroid. Repeat thyroid US 3/2024 generally stable with some irregularity to the right and left nodules, recommended repeating FNA, performed today. Will await results.   2. Osteoporosis - DXA repeated 6/2017 with results as noted above with stable/mild improvement in proximal radius T score of -3.1 from -3.4 (-11.3% change from 2013). s/p drug holiday for ~ 7 years. Started on prolia 1st dose 5/24/16. 2nd dose 11/29/16. 3rd Prolia 6/2017. 4th dose 12/2017. 5th dose 7/2018. 6th dose 1/2019. 7th dose of Prolia given 9/2019 (BUY AND BILL). 8th injection 1/2021. 9th injection 7/2021. 10th injection 2/2022. 11th injection 8/2022. 12th injection 2/2023. 13th injection 8/2023, 14th injection 3/6/2024. 15th injection today. Last DEXA 2/2024 generally stable. Next DEXA 2/2026. If new fractures on Prolia therapy or worsening BMD would recommend switch treatment to Romozosumab. Monitor calcium and vitamin D levels.  3. Prediabetes-Lifestyle modification advised. Now off ozempic. Check A1c.   4. HTN- c/w chlorthalidone and losartan  5. HLD- continue with statin.   Prep time with review of labs and interval progress notes and consultations  Discussion with patient regarding osteoporosis and thyroid management plan, risks and benefits, treatment options and goals of care answering all patients questions and addressing all concerns  Prolia given with osteoporosis management and FNA performed at todays visit Post-visit completion charting and review Total Time 55 min  Specifically causes, evaluation, treatment options, risks, complications, and benefits of available therapies were discussed. Questions were answered.  The submitted E/M billing level for this visit reflects the total time spent on the day of the visit including face-to-face time spent with the patient, non-face-to-face review of medical records and relevant information, documentation, and asynchronous communication with the patient after a visit via phone, email, or patients EHR portal after the visit.  The medical records reviewed are either scanned into the chart or reviewed with the patient using a patients electronic medical records portal for patients with records not available to [facility] via electronic transmission platforms from other institutions and labs.  Time spend counseling and performing coordination of care was also included in determining the appropriate EM billing level.  I have reviewed and verified information regarding the chief complaint and history recorded by the ancillary staff and/or the patient. I have independently reviewed and interpreted tests performed by other physicians and facilities as necessary.   I have discussed with the patient differential diagnosis, reason for auxiliary tests if ordered, risks, benefits, alternatives, and complications of each form of therapy were discussed.

## 2024-09-09 NOTE — PROCEDURE
[[employer] 2008 model, 10-12 MHz frequencies] : multiple real time longitudinal and transverse images were obtained using a high resolution ultrasound with a linear transducer, [employer] 2008 model, 10-12 MHz frequencies. All measurements will be reported as longitudinal x sal-posterior x transverse. [Report dated ___] : Report dated [unfilled] [Thyroid Nodule] : thyroid nodule [] : a heterogeneous parenchyma  [Lower] : lower pole there is a  [Peripheral vascularity] : peripheral vascularity [Mixed] : mixed [Heterogeneous] : heterogenous nodule [Distinct] : distinct [Peripheral and scant  internal vascularity] : peripheral and scant internal vascularity [Left Thyroid] : left [Mid] : mid pole there is a  [Hypoechoic] : hypoechoic nodule [Ovoid] : ovoid in shape [Right Thyroid] : right [Upper] : upper pole there is a  [Cyst] : cyst [Round] : round in shape [Smooth] : smooth [No] : does not have a halo [No calcifications] : no calcifications [No vascularity] : no vascularity [Fine Needle Aspiration] : Fine needle aspiration ~T ~C was performed. [Area of Mass: ______] : mass identified in the [unfilled] [Risks] : risks [Benefits] : benefits [Ethyl Chloride] : ethyl chloride [Supine] : The patient was placed in the supine position with the neck extended as tolerated. [Betadine] : with betadine solution [Alcohol] : with alcohol [25 gauge 1.5 inch] : A 25 gauge 1.5 inch needle was used [____ Passes] : [unfilled] passes were made through the mass [Ultrasonic Guidance] : ultrasound guidance was employed [Sent to Histology] : The specimens were prepared in the usual manner and sent to histology. [Thyroseq] : Thyroseq [Tolerated Well] : the patient tolerated the procedure well [No Complications] : There were no complications [Instructions Given] : Handouts/patient instructions were given to patient [___ Month(s)] : in [unfilled] month(s). [FreeTextEntry5] : 4.12 x 1.21 x 1.68 [FreeTextEntry2] : 0.25 [FreeTextEntry4] : parathyroid adenoma posterior to R lobe 1.18 x 1.08 x 0.96 cm [FreeTextEntry3] : 0.75 x 0.56 x 0.68 [de-identified] : Additional bilateral subcentimeter cysts and nodules too numerous to characterize.  [FreeTextEntry1] : Fine needle aspiration was performed.   Indication: mass identified in the Right and Left Thyroid Nodule.   The procedure's risks and benefits were discussed with. Procedure   Anesthesia was with ethyl chloride. The patient was placed in the supine position with the neck extended as tolerated.   The patient was prepped with betadine solution and with alcohol. A 25 gauge 1.5 inch needle was used. 2 passes on the right and 2 passes on the left passes were made through the mass and ultrasound guidance was employed. The specimens were prepared in the usual manner and sent to histology. The specimens were sent for genetic testing to the following: Thyroseq. Post-Procedure the patient tolerated the procedure well. There were no complications. Handouts/patient instructions were given to patient. Follow-up in the office in 6 month(s).

## 2024-09-09 NOTE — HISTORY OF PRESENT ILLNESS
[Prolia (Denosumab)] : Prolia (Denosumab) [Premat. Menopause (surg)] : premature menopause which occurred surgically [High Fall Risk] : high fall risk [Frequent Falls] : frequent falls [Uses a Walker/Cane] : use of a walker/cane [Regular Dental Follow-Up] : regular dental follow-up [History of Blood Clots] : history of blood clots [FreeTextEntry1] : 74-year-old F with PMH osteoporosis, thyroid nodules, prediabetes presents for follow up.  Osteoporosis: Seen in 2013 for osteoporosis. Told of osteopenia approximately in 2005. Was treated from [id number] with Fosamax and then stopped due to esophagitis. DXA 2013 stable - drug holiday continued. No history of fractures. Mother did not have hip fx. Patient smoked 1-2 PPD x 20 yrs - quit 1982. No prolonged steroid use. No recent steroids. Premature menopause at age 33 s/p RAJIV for fibroids. She has a history of several orthopedic procedures including spinal fusion 1966 and multiple further spinal surgeries. History of left hip replacement 2010 and right shoulder replacement 2012 for arthritis.  Taking Vit D 2000 IU QD. Now taking calcium. Seen initially by me 5/2/16 with repeat DXA with significant decline in BMD of 1/3 radius from -2.4 in 2013 to -3.4. PTH not elevated, but parathyroid adenoma possible visualized on thyroid U/S. Recommended prolia s/p 1st injection 5/24/16, 2nd injection 11/2016. 3rd injection 6/2017. 4th injection 12/18/17. 5th injection 7/2/18. 6th injection 1/2019. 7th injection 9/2019. Was due for 8th injection 3/2020, but postponed due to COVID. 8th injection given 1/2021.  9th injection 7/2021. 10th injection 2/2022 after calcium repeated to ensure no hypocalcemia. 11th injection 8/2022, 12th injection 2/2023. Here for 13th injection. Has fallen since last visit with left finger fracture. Was following with dental. No upcoming dental work. Has hx of dental implants.  DXA 6/2017 total spine -2.1, 1/3 radius -3.1, Hip -2.1. DXA 1/2021 stable. Last DEXA 2/2024 stable.  Had right hip replacement 1/2022. +chronic back, LE, and hip pain.  Had fall early 2024 without fracture. Reports diagnosis of left knee fracture without trauma 4/2024. Pain improved. No surgery pursued.   Had repair of hiatal hernia with complication of vagus nerve involvement now with gastroparesis s/p pyloric sphincterotomy. Now off Ozempic.   B/L Thyroid Nodules: Outpatient Thyroid US performed 3/2016 revealed B/L thyroid nodules, with heterogenous, hypoechoic solid nodule of the L midpole measuring 1.5 x 1.1 x 0.8cm;  heterogenous, hypoechoic solid nodule of the R mid-lower pole measuring 1.3 x 0.8 x 1.1 cm; and  heterogenous, hypoechoic solid nodule of the RLL measuring 1.3 x 1.4 x 1.0 cm.  Nodules were found incidentally on work up for falls. No personal h/o radiation to the head or neck. No h/o abnormal thyroid blood work. No voice changes. + dysphagia after paraesophageal hernia repair. Father had thyroid nodule - unknown if malignant. Seen initially 5/2016 with thyroid U/S confirming dominant left thyroid nodule. Recommended FNA performed 5/2016 with benign results. Repeat thyroid US 1/2019 and 8/2022 stable. Patient reports occasional hand tremors. Denies palpitations. Denies heat or cold intolerance. 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 Gastroparesis now improved after the GPOM procedure, with 49% retention of solids at four hours, compared to 94% in January 2024. However, she continues to experience chronic throat clearing and cough symptoms. Also with some constipation. No h/o of amiodarone or lithium use. No changes to the neck.   On chronic methadone for restless leg syndrome plans on coming off methadone.  [Taking Steroids] : no past or present history of taking steroids [Kidney Stones] : no history of kidney stones [Excessive Alcohol Intake] : no past or present history of excessive alcohol intake [Current Smoking___(ppd)] : not currently smoking [Family History of Osteoporosis] : no family history of osteoporosis [Family History of Breast Cancer] : no family history of breast cancer [Family History of Hip Fracture] : no family history of hip fracture [Hyperparathyroidism] : no hyperparathyroidism [History of Radiation Therapy] : no history of radiation therapy [Previous Fragility Fracture] : no previous fragility fracture

## 2024-09-09 NOTE — PROCEDURE
[[employer] e 2008 model, 10-12 MHz frequencies] : multiple real time longitudinal and transverse images were obtained using a high resolution ultrasound with a linear transducer, [employer] e 2008 model, 10-12 MHz frequencies. All measurements will be reported as longitudinal x sal-posterior x transverse. [Report dated ___] : Report dated [unfilled] [Thyroid Nodule] : thyroid nodule [] : a heterogeneous parenchyma  [Lower] : lower pole there is a  [Peripheral vascularity] : peripheral vascularity [Mixed] : mixed [Heterogeneous] : heterogenous nodule [Distinct] : distinct [Peripheral and scant  internal vascularity] : peripheral and scant internal vascularity [Left Thyroid] : left [Mid] : mid pole there is a  [Hypoechoic] : hypoechoic nodule [Ovoid] : ovoid in shape [Right Thyroid] : right [Upper] : upper pole there is a  [Cyst] : cyst [Round] : round in shape [Smooth] : smooth [No] : does not have a halo [No calcifications] : no calcifications [No vascularity] : no vascularity [Fine Needle Aspiration] : Fine needle aspiration ~T ~C was performed. [Area of Mass: ______] : mass identified in the [unfilled] [Risks] : risks [Benefits] : benefits [Ethyl Chloride] : ethyl chloride [Supine] : The patient was placed in the supine position with the neck extended as tolerated. [Betadine] : with betadine solution [Alcohol] : with alcohol [25 gauge 1.5 inch] : A 25 gauge 1.5 inch needle was used [____ Passes] : [unfilled] passes were made through the mass [Ultrasonic Guidance] : ultrasound guidance was employed [Sent to Histology] : The specimens were prepared in the usual manner and sent to histology. [Thyroseq] : Thyroseq [Tolerated Well] : the patient tolerated the procedure well [No Complications] : There were no complications [Instructions Given] : Handouts/patient instructions were given to patient [___ Month(s)] : in [unfilled] month(s). [FreeTextEntry5] : 4.12 x 1.21 x 1.68 [FreeTextEntry2] : 0.25 [FreeTextEntry4] : parathyroid adenoma posterior to R lobe 1.18 x 1.08 x 0.96 cm [FreeTextEntry3] : 0.75 x 0.56 x 0.68 [de-identified] : Additional bilateral subcentimeter cysts and nodules too numerous to characterize.  [FreeTextEntry1] : Fine needle aspiration was performed.   Indication: mass identified in the Right and Left Thyroid Nodule.   The procedure's risks and benefits were discussed with. Procedure   Anesthesia was with ethyl chloride. The patient was placed in the supine position with the neck extended as tolerated.   The patient was prepped with betadine solution and with alcohol. A 25 gauge 1.5 inch needle was used. 2 passes on the right and 2 passes on the left passes were made through the mass and ultrasound guidance was employed. The specimens were prepared in the usual manner and sent to histology. The specimens were sent for genetic testing to the following: Thyroseq. Post-Procedure the patient tolerated the procedure well. There were no complications. Handouts/patient instructions were given to patient. Follow-up in the office in 6 month(s).

## 2024-09-09 NOTE — REASON FOR VISIT
[Follow - Up] : a follow-up visit [Thyroid nodule/ MNG] : thyroid nodule/ MNG [Procedure: _________] : a [unfilled] procedure visit

## 2024-09-09 NOTE — REVIEW OF SYSTEMS
[Fatigue] : fatigue [Recent Weight Gain (___ Lbs)] : recent weight gain: [unfilled] lbs [Dysphagia] : dysphagia [Constipation] : constipation [Joint Pain] : joint pain [Back Pain] : back pain [Stress] : stress [All other systems negative] : All other systems negative [Recent Weight Loss (___ Lbs)] : no recent weight loss [Visual Field Defect] : no visual field defect [Neck Pain] : no neck pain [Dysphonia] : no dysphonia [Chest Pain] : no chest pain [Shortness Of Breath] : no shortness of breath [Nausea] : no nausea [Vomiting] : no vomiting [Diarrhea] : no diarrhea [Polyuria] : no polyuria [Polydipsia] : no polydipsia [FreeTextEntry6] : coughs with food awaiting pulm eval [FreeTextEntry7] : +gastroparesis [FreeTextEntry8] : Menopause

## 2024-09-09 NOTE — ASSESSMENT
[Carbohydrate Consistent Diet] : carbohydrate consistent diet [Importance of Diet and Exercise] : importance of diet and exercise to improve glycemic control, achieve weight loss and improve cardiovascular health [FreeTextEntry1] : 74-year-old F w/  1. B/L Thyroid nodules - Thyroid FNA of Left nodule (please refer to official US report) benign. Repeat focused thyroid U/S performed 8/2022 stable compared to 8/2021 stable compared to 1/2019 and stable compared to 2016 (see results section for full description). Clinically euthyroid. Repeat thyroid US 3/2024 generally stable with some irregularity to the right and left nodules, recommended repeating FNA, performed today. Will await results.   2. Osteoporosis - DXA repeated 6/2017 with results as noted above with stable/mild improvement in proximal radius T score of -3.1 from -3.4 (-11.3% change from 2013). s/p drug holiday for ~ 7 years. Started on prolia 1st dose 5/24/16. 2nd dose 11/29/16. 3rd Prolia 6/2017. 4th dose 12/2017. 5th dose 7/2018. 6th dose 1/2019. 7th dose of Prolia given 9/2019 (BUY AND BILL). 8th injection 1/2021. 9th injection 7/2021. 10th injection 2/2022. 11th injection 8/2022. 12th injection 2/2023. 13th injection 8/2023, 14th injection 3/6/2024. 15th injection today. Last DEXA 2/2024 generally stable. Next DEXA 2/2026. If new fractures on Prolia therapy or worsening BMD would recommend switch treatment to Romozosumab. Monitor calcium and vitamin D levels.  3. Prediabetes-Lifestyle modification advised. Now off ozempic. Check A1c.   4. HTN- c/w chlorthalidone and losartan  5. HLD- continue with statin.   Prep time with review of labs and interval progress notes and consultations  Discussion with patient regarding osteoporosis and thyroid management plan, risks and benefits, treatment options and goals of care answering all patients questions and addressing all concerns  Prolia given with osteoporosis management and FNA performed at todays visit Post-visit completion charting and review Total Time 55 min  Specifically causes, evaluation, treatment options, risks, complications, and benefits of available therapies were discussed. Questions were answered.  The submitted E/M billing level for this visit reflects the total time spent on the day of the visit including face-to-face time spent with the patient, non-face-to-face review of medical records and relevant information, documentation, and asynchronous communication with the patient after a visit via phone, email, or patients EHR portal after the visit.  The medical records reviewed are either scanned into the chart or reviewed with the patient using a patients electronic medical records portal for patients with records not available to [facility] via electronic transmission platforms from other institutions and labs.  Time spend counseling and performing coordination of care was also included in determining the appropriate EM billing level.  I have reviewed and verified information regarding the chief complaint and history recorded by the ancillary staff and/or the patient. I have independently reviewed and interpreted tests performed by other physicians and facilities as necessary.   I have discussed with the patient differential diagnosis, reason for auxiliary tests if ordered, risks, benefits, alternatives, and complications of each form of therapy were discussed.

## 2024-09-09 NOTE — HISTORY OF PRESENT ILLNESS
[Prolia (Denosumab)] : Prolia (Denosumab) [Premat. Menopause (surg)] : premature menopause which occurred surgically [High Fall Risk] : high fall risk [Frequent Falls] : frequent falls [Uses a Walker/Cane] : use of a walker/cane [Regular Dental Follow-Up] : regular dental follow-up [History of Blood Clots] : history of blood clots [FreeTextEntry1] : 74-year-old F with PMH osteoporosis, thyroid nodules, prediabetes presents for follow up.  Osteoporosis: Seen in 2013 for osteoporosis. Told of osteopenia approximately in 2005. Was treated from [id number] with Fosamax and then stopped due to esophagitis. DXA 2013 stable - drug holiday continued. No history of fractures. Mother did not have hip fx. Patient smoked 1-2 PPD x 20 yrs - quit 1982. No prolonged steroid use. No recent steroids. Premature menopause at age 33 s/p RAJIV for fibroids. She has a history of several orthopedic procedures including spinal fusion 1966 and multiple further spinal surgeries. History of left hip replacement 2010 and right shoulder replacement 2012 for arthritis.  Taking Vit D 2000 IU QD. Now taking calcium. Seen initially by me 5/2/16 with repeat DXA with significant decline in BMD of 1/3 radius from -2.4 in 2013 to -3.4. PTH not elevated, but parathyroid adenoma possible visualized on thyroid U/S. Recommended prolia s/p 1st injection 5/24/16, 2nd injection 11/2016. 3rd injection 6/2017. 4th injection 12/18/17. 5th injection 7/2/18. 6th injection 1/2019. 7th injection 9/2019. Was due for 8th injection 3/2020, but postponed due to COVID. 8th injection given 1/2021.  9th injection 7/2021. 10th injection 2/2022 after calcium repeated to ensure no hypocalcemia. 11th injection 8/2022, 12th injection 2/2023. Here for 13th injection. Has fallen since last visit with left finger fracture. Was following with dental. No upcoming dental work. Has hx of dental implants.  DXA 6/2017 total spine -2.1, 1/3 radius -3.1, Hip -2.1. DXA 1/2021 stable. Last DEXA 2/2024 stable.  Had right hip replacement 1/2022. +chronic back, LE, and hip pain.  Had fall early 2024 without fracture. Reports diagnosis of left knee fracture without trauma 4/2024. Pain improved. No surgery pursued.   Had repair of hiatal hernia with complication of vagus nerve involvement now with gastroparesis s/p pyloric sphincterotomy. Now off Ozempic.   B/L Thyroid Nodules: Outpatient Thyroid US performed 3/2016 revealed B/L thyroid nodules, with heterogenous, hypoechoic solid nodule of the L midpole measuring 1.5 x 1.1 x 0.8cm;  heterogenous, hypoechoic solid nodule of the R mid-lower pole measuring 1.3 x 0.8 x 1.1 cm; and  heterogenous, hypoechoic solid nodule of the RLL measuring 1.3 x 1.4 x 1.0 cm.  Nodules were found incidentally on work up for falls. No personal h/o radiation to the head or neck. No h/o abnormal thyroid blood work. No voice changes. + dysphagia after paraesophageal hernia repair. Father had thyroid nodule - unknown if malignant. Seen initially 5/2016 with thyroid U/S confirming dominant left thyroid nodule. Recommended FNA performed 5/2016 with benign results. Repeat thyroid US 1/2019 and 8/2022 stable. Patient reports occasional hand tremors. Denies palpitations. Denies heat or cold intolerance. 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 Gastroparesis now improved after the GPOM procedure, with 49% retention of solids at four hours, compared to 94% in January 2024. However, she continues to experience chronic throat clearing and cough symptoms. Also with some constipation. No h/o of amiodarone or lithium use. No changes to the neck.   On chronic methadone for restless leg syndrome plans on coming off methadone.  [Taking Steroids] : no past or present history of taking steroids [Kidney Stones] : no history of kidney stones [Excessive Alcohol Intake] : no past or present history of excessive alcohol intake [Current Smoking___(ppd)] : not currently smoking [Family History of Osteoporosis] : no family history of osteoporosis [Family History of Breast Cancer] : no family history of breast cancer [Family History of Hip Fracture] : no family history of hip fracture [Hyperparathyroidism] : no hyperparathyroidism [History of Radiation Therapy] : no history of radiation therapy [Previous Fragility Fracture] : no previous fragility fracture

## 2024-09-09 NOTE — PHYSICAL EXAM
[Alert] : alert [Well Nourished] : well nourished [Healthy Appearance] : healthy appearance [No Acute Distress] : no acute distress [Normal Voice/Communication] : normal voice communication [EOMI] : extra ocular movement intact [No Proptosis] : no proptosis [Supple] : the neck was supple [No Respiratory Distress] : no respiratory distress [No Accessory Muscle Use] : no accessory muscle use [Normal Rate and Effort] : normal respiratory rate and effort [Clear to Auscultation] : lungs were clear to auscultation bilaterally [Normal S1, S2] : normal S1 and S2 [Normal Rate] : heart rate was normal [Regular Rhythm] : with a regular rhythm [No Edema] : no peripheral edema [Normal Bowel Sounds] : normal bowel sounds [Not Tender] : non-tender [Not Distended] : not distended [Soft] : abdomen soft [Kyphosis] : kyphosis present [No Stigmata of Cushings Syndrome] : no stigmata of Cushings Syndrome [No Involuntary Movements] : no involuntary movements were seen [Oriented x3] : oriented to person, place, and time [Normal Affect] : the affect was normal [Normal Mood] : the mood was normal [de-identified] : +nodular thyroid gland

## 2024-09-11 LAB — FNA, THYROID: NORMAL

## 2024-09-12 ENCOUNTER — RX RENEWAL (OUTPATIENT)
Age: 74
End: 2024-09-12

## 2024-09-16 ENCOUNTER — APPOINTMENT (OUTPATIENT)
Dept: GASTROENTEROLOGY | Facility: CLINIC | Age: 74
End: 2024-09-16
Payer: MEDICARE

## 2024-09-16 VITALS
WEIGHT: 197 LBS | HEIGHT: 60 IN | DIASTOLIC BLOOD PRESSURE: 70 MMHG | SYSTOLIC BLOOD PRESSURE: 130 MMHG | OXYGEN SATURATION: 95 % | HEART RATE: 113 BPM | BODY MASS INDEX: 38.68 KG/M2

## 2024-09-16 DIAGNOSIS — K21.9 GASTRO-ESOPHAGEAL REFLUX DISEASE W/OUT ESOPHAGITIS: ICD-10-CM

## 2024-09-16 DIAGNOSIS — K31.84 GASTROPARESIS: ICD-10-CM

## 2024-09-16 PROCEDURE — G2211 COMPLEX E/M VISIT ADD ON: CPT

## 2024-09-16 PROCEDURE — 99214 OFFICE O/P EST MOD 30 MIN: CPT

## 2024-09-16 NOTE — ASSESSMENT
[FreeTextEntry1] : This includes the introduction of liquid alginate before sleeping at night, switching from Pantoprazole to Voquenza, and continuing on erythromycin. The patient is advised to share updates about her condition in four weeks via portal message or a phone call, with a scheduled follow-up visit in three months. - Plan : - Continue erythromycin  - Begin liquid alginate at night  - Switch from Pantoprazole to Voquenza (6 bottles of 10mg tablets given (5 each)) Lot number: [id number] Exp 3/2026 - Monitor reflux symptoms and stool changes  - Portal or phone contact in 4 weeks  - Scheduled follow-up visit in 3 months

## 2024-09-16 NOTE — HISTORY OF PRESENT ILLNESS
[FreeTextEntry1] : - Summary : The patient is a 74-year-old female with gastroparesis, post-fundoplication, here for a follow-up visit. She has been treated with erythromycin and is currently on Pantoprazole for acid reflux. However, she still reports regurgitation, acid exposure, and struggles with constipation. She also had a GPOEM, which demonstrated improvement of gastric emptying times and takes both Senna and Dulcolax to deal with bowel movements. She also reports nocturnal throat clearing and coughing. - Chief Complaint (CC) : Regurgitation, heartburn, throat clearing and constipation. - History of Present Illness (HPI) : The patient was diagnosed with gastroparesis, post-fundoplication. Gastric emptying times have improved from over 90% to 49% at four hours after GPOEM, and she was started on erythromycin therapy last month, which was cycled - two weeks on, one week off. However, she reports ongoing regurgitation and acid reflux issues vania with lying down at night in the form of throat clearing.  When she strains she will have some regurgitation. - Past Medical History : History of gastroparesis, post-fundoplication. She underwent a 24-hour acid reflux test in June which had an AET 25%. - Past Surgical History : She had undergone hernia surgery. - Review of Systems : Patient reports worsening nighttime coughing and throat clearing symptoms. She also reports regular bowel movements, but still experiences regurgitation after straining in the bathroom. - Medications : The patient is taking erythromycin and Dulcolax/Senna for gastric emptying and constipation respectively. She is also on Pantoprazole twice a day for acid reflux. - Allergies : No known allergies were discussed during this consultation. Objective: - Diagnostic Results : GPOEM testing showed improved gastric emptying times from over 90% to 49% in four hours. However, the patient's reflux test conducted in June showed an increased acid exposure time despite hernia surgery.

## 2024-09-19 ENCOUNTER — OUTPATIENT (OUTPATIENT)
Dept: OUTPATIENT SERVICES | Facility: HOSPITAL | Age: 74
LOS: 1 days | End: 2024-09-19
Payer: MEDICARE

## 2024-09-19 ENCOUNTER — APPOINTMENT (OUTPATIENT)
Dept: INTERNAL MEDICINE | Facility: CLINIC | Age: 74
End: 2024-09-19
Payer: MEDICARE

## 2024-09-19 DIAGNOSIS — I73.9 PERIPHERAL VASCULAR DISEASE, UNSPECIFIED: ICD-10-CM

## 2024-09-19 DIAGNOSIS — I10 ESSENTIAL (PRIMARY) HYPERTENSION: ICD-10-CM

## 2024-09-19 DIAGNOSIS — Z98.49 CATARACT EXTRACTION STATUS, UNSPECIFIED EYE: Chronic | ICD-10-CM

## 2024-09-19 DIAGNOSIS — Z96.643 PRESENCE OF ARTIFICIAL HIP JOINT, BILATERAL: Chronic | ICD-10-CM

## 2024-09-19 DIAGNOSIS — Z98.890 OTHER SPECIFIED POSTPROCEDURAL STATES: Chronic | ICD-10-CM

## 2024-09-19 PROCEDURE — 99213 OFFICE O/P EST LOW 20 MIN: CPT

## 2024-09-19 PROCEDURE — G0463: CPT

## 2024-09-20 VITALS — DIASTOLIC BLOOD PRESSURE: 74 MMHG | SYSTOLIC BLOOD PRESSURE: 128 MMHG

## 2024-09-20 RX ORDER — SEMAGLUTIDE 0.25 MG/.5ML
0.25 INJECTION, SOLUTION SUBCUTANEOUS
Qty: 1 | Refills: 3 | Status: ACTIVE | COMMUNITY
Start: 2024-09-20 | End: 1900-01-01

## 2024-09-20 NOTE — HISTORY OF PRESENT ILLNESS
[de-identified] : Ms. HUFFMAN comes in for reassessment of hypertension. She denies edema, chest pain, dizziness, POLLARD, PND and orthopnea.  She  has had no problems with her medications. Saw MR; diagnosed with cervical stenosis, but wants a LE arterial study to r/o PAD as cause of leg pain.

## 2024-09-20 NOTE — ASSESSMENT
[FreeTextEntry1] : Goal blood pressure /90 At goal Not at goal Urged to limit sodium intake Urged to maintain diet and exercise habits to keep BMI < 28 Limit alcohol consumption to < 7 drinks per week Reinforced adherence to medication regimen

## 2024-09-20 NOTE — HISTORY OF PRESENT ILLNESS
[de-identified] : Ms. HUFFMAN comes in for reassessment of hypertension. She denies edema, chest pain, dizziness, POLLARD, PND and orthopnea.  She  has had no problems with her medications. Saw MR; diagnosed with cervical stenosis, but wants a LE arterial study to r/o PAD as cause of leg pain.

## 2024-09-23 ENCOUNTER — APPOINTMENT (OUTPATIENT)
Dept: RADIOLOGY | Facility: CLINIC | Age: 74
End: 2024-09-23
Payer: MEDICARE

## 2024-09-23 ENCOUNTER — RESULT REVIEW (OUTPATIENT)
Age: 74
End: 2024-09-23

## 2024-09-23 DIAGNOSIS — R07.81 PLEURODYNIA: ICD-10-CM

## 2024-09-23 PROCEDURE — 71101 X-RAY EXAM UNILAT RIBS/CHEST: CPT | Mod: LT

## 2024-09-24 DIAGNOSIS — I73.9 PERIPHERAL VASCULAR DISEASE, UNSPECIFIED: ICD-10-CM

## 2024-09-25 ENCOUNTER — APPOINTMENT (OUTPATIENT)
Dept: ULTRASOUND IMAGING | Facility: CLINIC | Age: 74
End: 2024-09-25
Payer: MEDICARE

## 2024-09-25 PROCEDURE — 93925 LOWER EXTREMITY STUDY: CPT

## 2024-09-28 ENCOUNTER — TRANSCRIPTION ENCOUNTER (OUTPATIENT)
Age: 74
End: 2024-09-28

## 2024-09-28 ENCOUNTER — INPATIENT (INPATIENT)
Facility: HOSPITAL | Age: 74
LOS: 18 days | Discharge: NURSING, REHAB OR LONG-TERM CARE | DRG: 426 | End: 2024-10-17
Attending: NEUROLOGICAL SURGERY | Admitting: NEUROLOGICAL SURGERY
Payer: MEDICARE

## 2024-09-28 VITALS
OXYGEN SATURATION: 96 % | HEART RATE: 59 BPM | HEIGHT: 60 IN | RESPIRATION RATE: 20 BRPM | WEIGHT: 195.11 LBS | SYSTOLIC BLOOD PRESSURE: 183 MMHG | TEMPERATURE: 98 F | DIASTOLIC BLOOD PRESSURE: 103 MMHG

## 2024-09-28 DIAGNOSIS — Z96.643 PRESENCE OF ARTIFICIAL HIP JOINT, BILATERAL: Chronic | ICD-10-CM

## 2024-09-28 DIAGNOSIS — Z98.890 OTHER SPECIFIED POSTPROCEDURAL STATES: Chronic | ICD-10-CM

## 2024-09-28 DIAGNOSIS — Z98.49 CATARACT EXTRACTION STATUS, UNSPECIFIED EYE: Chronic | ICD-10-CM

## 2024-09-28 DIAGNOSIS — Z82.49 FAMILY HISTORY OF ISCHEMIC HEART DISEASE AND OTHER DISEASES OF THE CIRCULATORY SYSTEM: ICD-10-CM

## 2024-09-28 LAB
ALBUMIN SERPL ELPH-MCNC: 4.3 G/DL — SIGNIFICANT CHANGE UP (ref 3.3–5)
ALP SERPL-CCNC: 185 U/L — HIGH (ref 40–120)
ALT FLD-CCNC: 24 U/L — SIGNIFICANT CHANGE UP (ref 10–45)
ANION GAP SERPL CALC-SCNC: 14 MMOL/L — SIGNIFICANT CHANGE UP (ref 5–17)
ANION GAP SERPL CALC-SCNC: 14 MMOL/L — SIGNIFICANT CHANGE UP (ref 5–17)
APTT BLD: 27.8 SEC — SIGNIFICANT CHANGE UP (ref 24.5–35.6)
AST SERPL-CCNC: 23 U/L — SIGNIFICANT CHANGE UP (ref 10–40)
BASOPHILS # BLD AUTO: 0.03 K/UL — SIGNIFICANT CHANGE UP (ref 0–0.2)
BASOPHILS NFR BLD AUTO: 0.3 % — SIGNIFICANT CHANGE UP (ref 0–2)
BILIRUB SERPL-MCNC: 0.6 MG/DL — SIGNIFICANT CHANGE UP (ref 0.2–1.2)
BLD GP AB SCN SERPL QL: NEGATIVE — SIGNIFICANT CHANGE UP
BUN SERPL-MCNC: 26 MG/DL — HIGH (ref 7–23)
BUN SERPL-MCNC: 30 MG/DL — HIGH (ref 7–23)
CALCIUM SERPL-MCNC: 8.8 MG/DL — SIGNIFICANT CHANGE UP (ref 8.4–10.5)
CALCIUM SERPL-MCNC: 9.8 MG/DL — SIGNIFICANT CHANGE UP (ref 8.4–10.5)
CHLORIDE SERPL-SCNC: 97 MMOL/L — SIGNIFICANT CHANGE UP (ref 96–108)
CHLORIDE SERPL-SCNC: 98 MMOL/L — SIGNIFICANT CHANGE UP (ref 96–108)
CO2 SERPL-SCNC: 25 MMOL/L — SIGNIFICANT CHANGE UP (ref 22–31)
CO2 SERPL-SCNC: 25 MMOL/L — SIGNIFICANT CHANGE UP (ref 22–31)
CREAT SERPL-MCNC: 0.61 MG/DL — SIGNIFICANT CHANGE UP (ref 0.5–1.3)
CREAT SERPL-MCNC: 0.8 MG/DL — SIGNIFICANT CHANGE UP (ref 0.5–1.3)
EGFR: 76 ML/MIN/1.73M2 — SIGNIFICANT CHANGE UP
EGFR: 93 ML/MIN/1.73M2 — SIGNIFICANT CHANGE UP
EOSINOPHIL # BLD AUTO: 0 K/UL — SIGNIFICANT CHANGE UP (ref 0–0.5)
EOSINOPHIL NFR BLD AUTO: 0 % — SIGNIFICANT CHANGE UP (ref 0–6)
GLUCOSE SERPL-MCNC: 119 MG/DL — HIGH (ref 70–99)
GLUCOSE SERPL-MCNC: 175 MG/DL — HIGH (ref 70–99)
HCT VFR BLD CALC: 32.4 % — LOW (ref 34.5–45)
HCT VFR BLD CALC: 37.4 % — SIGNIFICANT CHANGE UP (ref 34.5–45)
HGB BLD-MCNC: 10.7 G/DL — LOW (ref 11.5–15.5)
HGB BLD-MCNC: 11.9 G/DL — SIGNIFICANT CHANGE UP (ref 11.5–15.5)
IMM GRANULOCYTES NFR BLD AUTO: 0.4 % — SIGNIFICANT CHANGE UP (ref 0–0.9)
INR BLD: 0.98 RATIO — SIGNIFICANT CHANGE UP (ref 0.85–1.16)
LYMPHOCYTES # BLD AUTO: 0.6 K/UL — LOW (ref 1–3.3)
LYMPHOCYTES # BLD AUTO: 6.3 % — LOW (ref 13–44)
MAGNESIUM SERPL-MCNC: 2 MG/DL — SIGNIFICANT CHANGE UP (ref 1.6–2.6)
MCHC RBC-ENTMCNC: 27.4 PG — SIGNIFICANT CHANGE UP (ref 27–34)
MCHC RBC-ENTMCNC: 28.2 PG — SIGNIFICANT CHANGE UP (ref 27–34)
MCHC RBC-ENTMCNC: 31.8 GM/DL — LOW (ref 32–36)
MCHC RBC-ENTMCNC: 33 GM/DL — SIGNIFICANT CHANGE UP (ref 32–36)
MCV RBC AUTO: 85.3 FL — SIGNIFICANT CHANGE UP (ref 80–100)
MCV RBC AUTO: 86.2 FL — SIGNIFICANT CHANGE UP (ref 80–100)
MONOCYTES # BLD AUTO: 0.23 K/UL — SIGNIFICANT CHANGE UP (ref 0–0.9)
MONOCYTES NFR BLD AUTO: 2.4 % — SIGNIFICANT CHANGE UP (ref 2–14)
NEUTROPHILS # BLD AUTO: 8.63 K/UL — HIGH (ref 1.8–7.4)
NEUTROPHILS NFR BLD AUTO: 90.6 % — HIGH (ref 43–77)
NRBC # BLD: 0 /100 WBCS — SIGNIFICANT CHANGE UP (ref 0–0)
NRBC # BLD: 0 /100 WBCS — SIGNIFICANT CHANGE UP (ref 0–0)
PHOSPHATE SERPL-MCNC: 3 MG/DL — SIGNIFICANT CHANGE UP (ref 2.5–4.5)
PLATELET # BLD AUTO: 250 K/UL — SIGNIFICANT CHANGE UP (ref 150–400)
PLATELET # BLD AUTO: 250 K/UL — SIGNIFICANT CHANGE UP (ref 150–400)
POTASSIUM SERPL-MCNC: 2.6 MMOL/L — CRITICAL LOW (ref 3.5–5.3)
POTASSIUM SERPL-MCNC: 3.3 MMOL/L — LOW (ref 3.5–5.3)
POTASSIUM SERPL-SCNC: 2.6 MMOL/L — CRITICAL LOW (ref 3.5–5.3)
POTASSIUM SERPL-SCNC: 3.3 MMOL/L — LOW (ref 3.5–5.3)
PROT SERPL-MCNC: 7.3 G/DL — SIGNIFICANT CHANGE UP (ref 6–8.3)
PROTHROM AB SERPL-ACNC: 11.3 SEC — SIGNIFICANT CHANGE UP (ref 9.9–13.4)
RBC # BLD: 3.8 M/UL — SIGNIFICANT CHANGE UP (ref 3.8–5.2)
RBC # BLD: 4.34 M/UL — SIGNIFICANT CHANGE UP (ref 3.8–5.2)
RBC # FLD: 14.6 % — HIGH (ref 10.3–14.5)
RBC # FLD: 14.9 % — HIGH (ref 10.3–14.5)
RH IG SCN BLD-IMP: NEGATIVE — SIGNIFICANT CHANGE UP
SODIUM SERPL-SCNC: 136 MMOL/L — SIGNIFICANT CHANGE UP (ref 135–145)
SODIUM SERPL-SCNC: 137 MMOL/L — SIGNIFICANT CHANGE UP (ref 135–145)
WBC # BLD: 9.28 K/UL — SIGNIFICANT CHANGE UP (ref 3.8–10.5)
WBC # BLD: 9.53 K/UL — SIGNIFICANT CHANGE UP (ref 3.8–10.5)
WBC # FLD AUTO: 9.28 K/UL — SIGNIFICANT CHANGE UP (ref 3.8–10.5)
WBC # FLD AUTO: 9.53 K/UL — SIGNIFICANT CHANGE UP (ref 3.8–10.5)

## 2024-09-28 PROCEDURE — 99285 EMERGENCY DEPT VISIT HI MDM: CPT | Mod: GC

## 2024-09-28 PROCEDURE — 72125 CT NECK SPINE W/O DYE: CPT | Mod: 26,MC

## 2024-09-28 PROCEDURE — 72100 X-RAY EXAM L-S SPINE 2/3 VWS: CPT | Mod: 26

## 2024-09-28 PROCEDURE — 72128 CT CHEST SPINE W/O DYE: CPT | Mod: 26,MC

## 2024-09-28 PROCEDURE — 71045 X-RAY EXAM CHEST 1 VIEW: CPT | Mod: 26

## 2024-09-28 PROCEDURE — 72131 CT LUMBAR SPINE W/O DYE: CPT | Mod: 26,MC

## 2024-09-28 PROCEDURE — 70450 CT HEAD/BRAIN W/O DYE: CPT | Mod: 26,MC

## 2024-09-28 PROCEDURE — 74176 CT ABD & PELVIS W/O CONTRAST: CPT | Mod: 26,MC

## 2024-09-28 PROCEDURE — 71250 CT THORAX DX C-: CPT | Mod: 26,MC

## 2024-09-28 PROCEDURE — 99232 SBSQ HOSP IP/OBS MODERATE 35: CPT | Mod: FS

## 2024-09-28 PROCEDURE — 72170 X-RAY EXAM OF PELVIS: CPT | Mod: 26

## 2024-09-28 RX ORDER — DULOXETINE HCL 20 MG
20 CAPSULE,DELAYED RELEASE (ENTERIC COATED) ORAL DAILY
Refills: 0 | Status: DISCONTINUED | OUTPATIENT
Start: 2024-09-28 | End: 2024-09-29

## 2024-09-28 RX ORDER — ACETAMINOPHEN 325 MG
1000 TABLET ORAL ONCE
Refills: 0 | Status: COMPLETED | OUTPATIENT
Start: 2024-09-28 | End: 2024-09-28

## 2024-09-28 RX ORDER — HYDROMORPHONE HYDROCHLORIDE 1 MG/ML
1 INJECTION, SOLUTION INTRAMUSCULAR; INTRAVENOUS; SUBCUTANEOUS ONCE
Refills: 0 | Status: DISCONTINUED | OUTPATIENT
Start: 2024-09-28 | End: 2024-09-28

## 2024-09-28 RX ORDER — PRAMIPEXOLE DIHYDROCHLORIDE 0.5 MG/1
3.75 TABLET ORAL
Refills: 0 | Status: DISCONTINUED | OUTPATIENT
Start: 2024-09-28 | End: 2024-09-28

## 2024-09-28 RX ORDER — MORPHINE SULFATE 30 MG/1
4 TABLET, FILM COATED, EXTENDED RELEASE ORAL ONCE
Refills: 0 | Status: DISCONTINUED | OUTPATIENT
Start: 2024-09-28 | End: 2024-09-28

## 2024-09-28 RX ORDER — SENNOSIDES 8.6 MG
1 TABLET ORAL DAILY
Refills: 0 | Status: DISCONTINUED | OUTPATIENT
Start: 2024-09-28 | End: 2024-09-29

## 2024-09-28 RX ORDER — ONDANSETRON HCL/PF 4 MG/2 ML
4 VIAL (ML) INJECTION EVERY 6 HOURS
Refills: 0 | Status: DISCONTINUED | OUTPATIENT
Start: 2024-09-28 | End: 2024-09-29

## 2024-09-28 RX ORDER — FAMOTIDINE 40 MG
40 TABLET ORAL
Refills: 0 | Status: DISCONTINUED | OUTPATIENT
Start: 2024-09-28 | End: 2024-09-29

## 2024-09-28 RX ORDER — LIDOCAINE 50 MG/G
1 CREAM TOPICAL ONCE
Refills: 0 | Status: COMPLETED | OUTPATIENT
Start: 2024-09-28 | End: 2024-09-28

## 2024-09-28 RX ORDER — HYDROMORPHONE HYDROCHLORIDE 1 MG/ML
30 INJECTION, SOLUTION INTRAMUSCULAR; INTRAVENOUS; SUBCUTANEOUS
Refills: 0 | Status: DISCONTINUED | OUTPATIENT
Start: 2024-09-28 | End: 2024-09-29

## 2024-09-28 RX ORDER — LOSARTAN POTASSIUM 100 MG/1
100 TABLET, FILM COATED ORAL DAILY
Refills: 0 | Status: DISCONTINUED | OUTPATIENT
Start: 2024-09-28 | End: 2024-09-28

## 2024-09-28 RX ORDER — BISACODYL 5 MG/1
5 TABLET, COATED ORAL AT BEDTIME
Refills: 0 | Status: DISCONTINUED | OUTPATIENT
Start: 2024-09-28 | End: 2024-09-29

## 2024-09-28 RX ORDER — LOSARTAN POTASSIUM 100 MG/1
50 TABLET, FILM COATED ORAL DAILY
Refills: 0 | Status: DISCONTINUED | OUTPATIENT
Start: 2024-09-28 | End: 2024-09-28

## 2024-09-28 RX ORDER — SODIUM CHLORIDE 0.9 % (FLUSH) 0.9 %
1000 SYRINGE (ML) INJECTION
Refills: 0 | Status: DISCONTINUED | OUTPATIENT
Start: 2024-09-28 | End: 2024-09-29

## 2024-09-28 RX ORDER — ALPRAZOLAM 0.5 MG/1
0.5 TABLET ORAL ONCE
Refills: 0 | Status: DISCONTINUED | OUTPATIENT
Start: 2024-09-28 | End: 2024-09-28

## 2024-09-28 RX ORDER — PRAMIPEXOLE DIHYDROCHLORIDE 0.5 MG/1
1 TABLET ORAL AT BEDTIME
Refills: 0 | Status: DISCONTINUED | OUTPATIENT
Start: 2024-09-28 | End: 2024-09-28

## 2024-09-28 RX ORDER — CHLORHEXIDINE GLUCONATE ORAL RINSE 1.2 MG/ML
1 SOLUTION DENTAL
Refills: 0 | Status: DISCONTINUED | OUTPATIENT
Start: 2024-09-28 | End: 2024-09-29

## 2024-09-28 RX ORDER — HYDROMORPHONE HYDROCHLORIDE 1 MG/ML
1 INJECTION, SOLUTION INTRAMUSCULAR; INTRAVENOUS; SUBCUTANEOUS
Refills: 0 | Status: DISCONTINUED | OUTPATIENT
Start: 2024-09-28 | End: 2024-09-29

## 2024-09-28 RX ORDER — PRAMIPEXOLE DIHYDROCHLORIDE 0.5 MG/1
1.5 TABLET ORAL ONCE
Refills: 0 | Status: COMPLETED | OUTPATIENT
Start: 2024-09-28 | End: 2024-09-28

## 2024-09-28 RX ORDER — HYDROMORPHONE HYDROCHLORIDE 1 MG/ML
0.5 INJECTION, SOLUTION INTRAMUSCULAR; INTRAVENOUS; SUBCUTANEOUS
Refills: 0 | Status: DISCONTINUED | OUTPATIENT
Start: 2024-09-28 | End: 2024-09-28

## 2024-09-28 RX ORDER — NOREPINEPHRINE BITARTRATE/D5W 16MG/250ML
0.05 PLASTIC BAG, INJECTION (ML) INTRAVENOUS
Qty: 8 | Refills: 0 | Status: DISCONTINUED | OUTPATIENT
Start: 2024-09-28 | End: 2024-09-29

## 2024-09-28 RX ORDER — MONTELUKAST SODIUM 10 MG/1
10 TABLET, FILM COATED ORAL AT BEDTIME
Refills: 0 | Status: DISCONTINUED | OUTPATIENT
Start: 2024-09-28 | End: 2024-09-29

## 2024-09-28 RX ORDER — ATORVASTATIN CALCIUM 10 MG/1
40 TABLET, FILM COATED ORAL AT BEDTIME
Refills: 0 | Status: DISCONTINUED | OUTPATIENT
Start: 2024-09-28 | End: 2024-09-29

## 2024-09-28 RX ORDER — ACETAMINOPHEN 325 MG
325 TABLET ORAL EVERY 4 HOURS
Refills: 0 | Status: DISCONTINUED | OUTPATIENT
Start: 2024-09-28 | End: 2024-09-29

## 2024-09-28 RX ORDER — NALOXONE HYDROCHLORIDE 0.4 MG/ML
0.1 INJECTION, SOLUTION INTRAMUSCULAR; INTRAVENOUS; SUBCUTANEOUS
Refills: 0 | Status: DISCONTINUED | OUTPATIENT
Start: 2024-09-28 | End: 2024-09-29

## 2024-09-28 RX ORDER — ONDANSETRON HCL/PF 4 MG/2 ML
4 VIAL (ML) INJECTION ONCE
Refills: 0 | Status: COMPLETED | OUTPATIENT
Start: 2024-09-28 | End: 2024-09-28

## 2024-09-28 RX ORDER — PRAMIPEXOLE DIHYDROCHLORIDE 0.5 MG/1
3.75 TABLET ORAL ONCE
Refills: 0 | Status: DISCONTINUED | OUTPATIENT
Start: 2024-09-28 | End: 2024-09-28

## 2024-09-28 RX ORDER — HYDROMORPHONE HYDROCHLORIDE 1 MG/ML
0.5 INJECTION, SOLUTION INTRAMUSCULAR; INTRAVENOUS; SUBCUTANEOUS
Refills: 0 | Status: DISCONTINUED | OUTPATIENT
Start: 2024-09-28 | End: 2024-09-29

## 2024-09-28 RX ORDER — HYDROMORPHONE HYDROCHLORIDE 1 MG/ML
0.5 INJECTION, SOLUTION INTRAMUSCULAR; INTRAVENOUS; SUBCUTANEOUS EVERY 6 HOURS
Refills: 0 | Status: DISCONTINUED | OUTPATIENT
Start: 2024-09-28 | End: 2024-09-28

## 2024-09-28 RX ORDER — MORPHINE SULFATE 30 MG/1
2 TABLET, FILM COATED, EXTENDED RELEASE ORAL ONCE
Refills: 0 | Status: DISCONTINUED | OUTPATIENT
Start: 2024-09-28 | End: 2024-09-28

## 2024-09-28 RX ADMIN — MORPHINE SULFATE 4 MILLIGRAM(S): 30 TABLET, FILM COATED, EXTENDED RELEASE ORAL at 04:41

## 2024-09-28 RX ADMIN — HYDROMORPHONE HYDROCHLORIDE 1 MILLIGRAM(S): 1 INJECTION, SOLUTION INTRAMUSCULAR; INTRAVENOUS; SUBCUTANEOUS at 17:04

## 2024-09-28 RX ADMIN — HYDROMORPHONE HYDROCHLORIDE 1 MILLIGRAM(S): 1 INJECTION, SOLUTION INTRAMUSCULAR; INTRAVENOUS; SUBCUTANEOUS at 05:34

## 2024-09-28 RX ADMIN — Medication 100 MILLIEQUIVALENT(S): at 23:33

## 2024-09-28 RX ADMIN — Medication 40 MILLIEQUIVALENT(S): at 23:32

## 2024-09-28 RX ADMIN — HYDROMORPHONE HYDROCHLORIDE 30 MILLILITER(S): 1 INJECTION, SOLUTION INTRAMUSCULAR; INTRAVENOUS; SUBCUTANEOUS at 22:52

## 2024-09-28 RX ADMIN — MORPHINE SULFATE 4 MILLIGRAM(S): 30 TABLET, FILM COATED, EXTENDED RELEASE ORAL at 09:55

## 2024-09-28 RX ADMIN — ATORVASTATIN CALCIUM 40 MILLIGRAM(S): 10 TABLET, FILM COATED ORAL at 21:32

## 2024-09-28 RX ADMIN — HYDROMORPHONE HYDROCHLORIDE 0.5 MILLIGRAM(S): 1 INJECTION, SOLUTION INTRAMUSCULAR; INTRAVENOUS; SUBCUTANEOUS at 16:45

## 2024-09-28 RX ADMIN — Medication 8.3 MICROGRAM(S)/KG/MIN: at 21:00

## 2024-09-28 RX ADMIN — Medication 1000 MILLILITER(S): at 20:45

## 2024-09-28 RX ADMIN — Medication 1000 MILLIGRAM(S): at 04:50

## 2024-09-28 RX ADMIN — Medication 1 GRAM(S): at 23:47

## 2024-09-28 RX ADMIN — Medication 400 MILLIGRAM(S): at 18:25

## 2024-09-28 RX ADMIN — MORPHINE SULFATE 2 MILLIGRAM(S): 30 TABLET, FILM COATED, EXTENDED RELEASE ORAL at 03:48

## 2024-09-28 RX ADMIN — Medication 400 MILLIGRAM(S): at 03:22

## 2024-09-28 RX ADMIN — MONTELUKAST SODIUM 10 MILLIGRAM(S): 10 TABLET, FILM COATED ORAL at 21:32

## 2024-09-28 RX ADMIN — LIDOCAINE 1 PATCH: 50 CREAM TOPICAL at 03:21

## 2024-09-28 RX ADMIN — MORPHINE SULFATE 4 MILLIGRAM(S): 30 TABLET, FILM COATED, EXTENDED RELEASE ORAL at 13:55

## 2024-09-28 RX ADMIN — MORPHINE SULFATE 4 MILLIGRAM(S): 30 TABLET, FILM COATED, EXTENDED RELEASE ORAL at 05:50

## 2024-09-28 RX ADMIN — MORPHINE SULFATE 4 MILLIGRAM(S): 30 TABLET, FILM COATED, EXTENDED RELEASE ORAL at 09:40

## 2024-09-28 RX ADMIN — MORPHINE SULFATE 2 MILLIGRAM(S): 30 TABLET, FILM COATED, EXTENDED RELEASE ORAL at 04:20

## 2024-09-28 RX ADMIN — BISACODYL 5 MILLIGRAM(S): 5 TABLET, COATED ORAL at 21:32

## 2024-09-28 RX ADMIN — ALPRAZOLAM 0.5 MILLIGRAM(S): 0.5 TABLET ORAL at 17:03

## 2024-09-28 RX ADMIN — Medication 4 MILLIGRAM(S): at 15:44

## 2024-09-28 RX ADMIN — HYDROMORPHONE HYDROCHLORIDE 0.5 MILLIGRAM(S): 1 INJECTION, SOLUTION INTRAMUSCULAR; INTRAVENOUS; SUBCUTANEOUS at 16:29

## 2024-09-28 RX ADMIN — Medication 1000 MILLIGRAM(S): at 19:00

## 2024-09-28 RX ADMIN — Medication 500 MILLIGRAM(S): at 21:33

## 2024-09-28 RX ADMIN — MORPHINE SULFATE 4 MILLIGRAM(S): 30 TABLET, FILM COATED, EXTENDED RELEASE ORAL at 14:19

## 2024-09-28 RX ADMIN — HYDROMORPHONE HYDROCHLORIDE 1 MILLIGRAM(S): 1 INJECTION, SOLUTION INTRAMUSCULAR; INTRAVENOUS; SUBCUTANEOUS at 22:00

## 2024-09-28 RX ADMIN — LIDOCAINE 1 PATCH: 50 CREAM TOPICAL at 03:49

## 2024-09-28 RX ADMIN — HYDROMORPHONE HYDROCHLORIDE 1 MILLIGRAM(S): 1 INJECTION, SOLUTION INTRAMUSCULAR; INTRAVENOUS; SUBCUTANEOUS at 06:50

## 2024-09-28 RX ADMIN — HYDROMORPHONE HYDROCHLORIDE 0.5 MILLIGRAM(S): 1 INJECTION, SOLUTION INTRAMUSCULAR; INTRAVENOUS; SUBCUTANEOUS at 19:47

## 2024-09-28 RX ADMIN — Medication 70 MILLILITER(S): at 18:26

## 2024-09-28 RX ADMIN — HYDROMORPHONE HYDROCHLORIDE 1 MILLIGRAM(S): 1 INJECTION, SOLUTION INTRAMUSCULAR; INTRAVENOUS; SUBCUTANEOUS at 22:15

## 2024-09-28 RX ADMIN — HYDROMORPHONE HYDROCHLORIDE 0.5 MILLIGRAM(S): 1 INJECTION, SOLUTION INTRAMUSCULAR; INTRAVENOUS; SUBCUTANEOUS at 20:30

## 2024-09-28 NOTE — ED PROVIDER NOTE - CLINICAL SUMMARY MEDICAL DECISION MAKING FREE TEXT BOX
74 female past medical history several spinal fusion surgeries, cervical stenosis, hypertension, gastroparesis, not on anticoagulation presenting with lower back pain and vomiting after a fall at 6 PM.  Son at bedside for collateral.  Patient says that she was in a parking lot and went to sit down on her rollator and it slipped out from behind her causing patient to hit her lower back on the rollator.  Patient is unsure if she hit her head but denies any LOC.  Patient started vomiting about an hour after she fell and has had 10 episodes of nonbloody nonbilious emesis.  Last episode was 1 hour prior to arrival in the emergency room.  No chest pain, shortness of breath, abdominal pain, weakness, saddle anesthesia, urinary retention/incontinence, fecal incontinence.  Patient has been able to ambulate since falling.    PHYSICAL EXAM:  GENERAL: Uncomfortable 2/2 pain  HENMT: Atraumatic, moist mucous membranes  EYES: Clear bilaterally, EOMs intact b/l  HEART: Regular rate and regular rhythm  RESPIRATORY: Clear to auscultation bilaterally, no wheezes/rhonchi/rales  ABDOMEN: Soft, nontender, nondistended  MSK: B/l lumbar paraspinal ttp, no chest wall ttp, pelvis stable  EXTREMITIES: B/l upper and lower extremities with normal ROM and no ttp, no lower extremity edema  NEURO: Alert, follows commands, no focal motor deficits or sensory deficits   SKIN: Skin normal color for race, warm, dry     Vitals stable.  Differential diagnosis includes but not limited to hematoma, contusion, fracture, less concern for intracranial hemorrhage given neurological exam.  Plan for blood work, imaging, 74 female past medical history several spinal fusion surgeries, cervical stenosis, hypertension, gastroparesis, not on anticoagulation presenting with lower back pain and vomiting after a fall at 6 PM.  Son at bedside for collateral.  Patient says that she was in a parking lot and went to sit down on her rollator and it slipped out from behind her causing patient to hit her lower back on the rollator.  Patient is unsure if she hit her head but denies any LOC.  Patient started vomiting about an hour after she fell and has had 10 episodes of nonbloody nonbilious emesis.  Last episode was 1 hour prior to arrival in the emergency room.  No chest pain, shortness of breath, abdominal pain, weakness, saddle anesthesia, urinary retention/incontinence, fecal incontinence.  Patient has been able to ambulate since falling.    PHYSICAL EXAM:  GENERAL: Uncomfortable 2/2 pain  HENMT: Atraumatic, moist mucous membranes  EYES: Clear bilaterally, EOMs intact b/l  HEART: Regular rate and regular rhythm  RESPIRATORY: Clear to auscultation bilaterally, no wheezes/rhonchi/rales  ABDOMEN: Soft, nontender, nondistended  MSK: B/l lumbar paraspinal ttp, no chest wall ttp, pelvis stable  EXTREMITIES: B/l upper and lower extremities with normal ROM and no ttp, no lower extremity edema  NEURO: Alert, follows commands, no focal motor deficits or sensory deficits   SKIN: Skin normal color for race, warm, dry     Vitals stable.  Differential diagnosis includes but not limited to hematoma, contusion, fracture, less concern for intracranial hemorrhage given neurological exam.  Plan for blood work, imaging, pain meds. Will re-assess.

## 2024-09-28 NOTE — ED PROVIDER NOTE - PROGRESS NOTE DETAILS
Attending Masom:  spine paged Attending Masom:  trauma paged Attending Masom:  trauma consulted, awaiting spine call back Patient is a known patient of Dr. Higgins.  Consulted orthopedics for T12 fracture.  Per Ortho resident since patient has never had surgery with Dr. Higgins and has not been seen within 90 days would recommend on-call spine neurosurgery consult.  Spoke with neurosurgery who will see patient. -Alan Gruber PA-C Neurosurgery recommending admission for operative repair.  Patient remains neuro intact.  MRI scheduled for 10 PM this evening. -Alan Gruber PA-C

## 2024-09-28 NOTE — CONSULT NOTE ADULT - SUBJECTIVE AND OBJECTIVE BOX
p ([id number])     HPI: 74F on celecoxib for OA last took y’day otherwise no AC/AP h/o HTN, gastroparesis, peripheral neuropathy, multiple prior thoracolumbar spine surgeries for congenital scoliosis [place]/ [place] of hardware 1991 presents with back pain s/p mechanical fall from sitting y'day. Describes severe mid back pain and left hip pain, no numbness, weakness, saddle anesthesia, b/b symptoms. Pain worsened with movement, axial loading. CT TL spine w/ acute nondisplaced fracture of T12 veterbral body extending into right pedicle w/o clear cord impingement.     Exam: Awake, Ox3, BUE 5/5, BLE HF 5/5, KF/KE 5/5, PF/DF 5/5. Mildly decreased sensation BLE stocking distribution up to ankles stable compared to prior to fall. No axial TTP, no palpable step offs or deformities.     =====================  PAST MEDICAL HISTORY   H/O seasonal allergies    History of pulmonary embolism    Restless leg syndrome    GERD (gastroesophageal reflux disease)    OA (osteoarthritis)    Fungal infection of skin    H/O scoliosis    Essential hypertension    Depression    Osteoporosis    Right hip pain    2019 novel coronavirus disease (COVID-19)    Hiatal hernia    History of chronic pain    Peripheral neuropathy      PAST SURGICAL HISTORY   S/P repair of paraesophageal hernia    S/P spinal fusion    Scoliosis    S/P spinal surgery    Removal of pin, plate, abigail, or screw    S/P hysterectomy    S/P hip replacement    S/P shoulder surgery    S/P dilatation and curettage    H/O arthroscopy of knee    H/O total knee replacement, right    S/P cataract surgery    History of bilateral hip replacements      penicillins (Urticaria)  erythromycin (Stomach Upset; Vomiting; Nausea)  Dilantin (Urticaria)      MEDICATIONS:  Antibiotics:    Neuro:    Other:      SOCIAL HISTORY:   Occupation:   Marital Status:     FAMILY HISTORY:  Family history of abdominal aortic aneurysm (AAA) (Mother)        ROS: Negative except per HPI    LABS:  PT/INR - ( 28 Sep 2024 03:27 )   PT: 11.3 sec;   INR: 0.98 ratio         PTT - ( 28 Sep 2024 03:27 )  PTT:27.8 sec                        11.9   9.53  )-----------( 250      ( 28 Sep 2024 03:27 )             37.4     09-28    136  |  97  |  30[H]  ----------------------------<  175[H]  3.3[L]   |  25  |  0.80    Ca    9.8      28 Sep 2024 03:27    TPro  7.3  /  Alb  4.3  /  TBili  0.6  /  DBili  x   /  AST  23  /  ALT  24  /  AlkPhos  185[H]  09-28

## 2024-09-28 NOTE — H&P ADULT - HISTORY OF PRESENT ILLNESS
74F on celecoxib for OA last took y’day otherwise no AC/AP h/o HTN, gastroparesis, peripheral neuropathy, multiple prior thoracolumbar spine surgeries for congenital scoliosis [place] >[place] ago [place] state w/ removal of hardware 1991 presents with back pain s/p mechanical fall from sitting y'day. Describes severe mid back pain and left hip pain, no numbness, weakness, saddle anesthesia, b/b symptoms. Pain worsened with movement, axial loading. CT TL spine w/ acute nondisplaced fracture of T12 veterbral body extending into right pedicle w/o clear cord impingement.

## 2024-09-28 NOTE — ED ADULT NURSE REASSESSMENT NOTE - NS ED NURSE REASSESS COMMENT FT1
RN gave report to [facility] ARUN Correa at this time. Pt to arrive with MD and EDT from MRI to [facility].

## 2024-09-28 NOTE — ED PROVIDER NOTE - ATTENDING CONTRIBUTION TO CARE
MD Ricks:  patient seen and evaluated personally.   I agree with the History & Physical,  Impression & Plan other than what was detailed in my note.  MD Ricks  74 female past medical history several spinal fusion surgeries, cervical stenosis, hypertension, gastroparesis, not on anticoagulation presenting to ed s/p fall. Pt went to sit on rollator and it came out from behind her she landed on her buttock and is complaining of back pain, not sure if hit her head, no head pain, no neck pain, no cp, sob, no syncope, afebrile vitals stable  appears in pain, NC/AT no max face ttp,  conjunctiva non conjected, sclera anicteric PERRL, moist mucous membranes, neck supple, no midline c/ts spine ttp,  pos lower thoracic, upper lumbar ttp, pos left post rib ttp heart sounds, normal, no mrg, lungs cta b/l no wrr, no chest ttp,  abd soft non distended w/ no tenderness, pelvis stable, no visual deformities of extremities, from of all joints, no ttp, axox3, , normal mood and affect, given fall and pain will get labs, pain meds, ct chest/abd pelvis screen for rib/back frx.

## 2024-09-28 NOTE — ED PROVIDER NOTE - WR ORDER STATUS 4
Inpatient Anticoagulation Service Note    Date: 2021    Reason for Anticoagulation: Atrial Fibrillation   Target INR: 2.0 to 3.0         Hemoglobin Value: 12.1  Hematocrit Value: 40.1  Lab Platelet Value: 248    INR from last 7 days     Date/Time INR Value    21 0352  (!) 1.41        Dose from last 7 days     Date/Time Dose (mg)    21 0446  15        Significant Interactions: Corticosteroids, Aspirin  Bridge Therapy: No     Reversal Agent Administered: Not Applicable  Comments: Discussed with patient, she reports no new medications or changes in diet that would effect her INR. It's unclear why her INR continues to not respond to warfarin. Given low CHADSVASC, wouldn't recommend bridge therapy at this time.    Plan:  15mg  Education Material Provided?: No  Pharmacist suggested discharge dosinmg daily except 9mg Thursday and .      Jeremias Joel, PharmD             Performed

## 2024-09-28 NOTE — ED ADULT TRIAGE NOTE - CHIEF COMPLAINT QUOTE
fell about 1800 when she sat on rollator walker while on sidewalk and fell backwards; reports head strike; vomit  x 10 episodes; psh of 9 spinal surgeries, last one 1990; took naproxen at home; not on blood thinners

## 2024-09-28 NOTE — ED PROVIDER NOTE - CADM POA PRESS ULCER
Date of Service: 11/21/2018    INNOVATIVE PAIN CARE NOTE    CHIEF COMPLAINT:  Arthritis.    HISTORY OF PRESENT ILLNESS:  Coni comes in to follow up on her chronic opioid use.  She utilizes OxyContin 20 mg t.i.d.  She was recently hospitalized for ischemic with bowel resection.  She was discharged just recently.  She states she has enough oral pain medications at this time.    On exam today, she is in a wheelchair.  She is in good spirits.  Her blood pressure is 115/76, pulse of 94.  O2 sat is 93%.    IMPRESSION:  Chronic pain secondary to arthritis.    PLAN:  She is to call us when she needs a refill.  We will see her back in 3 months.      Dictated By: Jeovanny Whiting MD  Signing Provider: Jeovanny Whiting MD GM/ne ([id number])  DD: 11/21/2018 13:55:12 TD: 11/23/2018 17:41:33    Copy Sent To:    No

## 2024-09-28 NOTE — H&P ADULT - ASSESSMENT
HPI: [id number] on celecoxib for OA last took y’day otherwise no AC/AP h/o HTN, gastroparesis, peripheral neuropathy, multiple prior thoracolumbar spine surgeries for congenital scoliosis [place]/ [place] of hardware 1991 presents with back pain s/p mechanical fall from sitting y'day. Describes severe mid back pain and left hip pain, no numbness, weakness, saddle anesthesia, b/b symptoms. Pain worsened with movement, axial loading. CT TL spine w/ acute nondisplaced fracture of T12 veterbral body extending into right pedicle w/o clear cord impingement. Last PO noon 9/27.    -admit NSCU under Dr. Stroud  -strict spinal precautions, cannot lie flat  -MR T/L spine w/o con to eval ligamentous injury  -preop for OR tomorrow 9/29 for fixation

## 2024-09-28 NOTE — ED ADULT NURSE REASSESSMENT NOTE - NS ED NURSE REASSESS COMMENT FT1
MRI forms faxed to MRI. Patient requesting further pain management, MD Briscoe aware. Per MD Briscoe pending spine consult. Patient now sleeping. Bed locked and lowered. Comfort and safety measures maintained.

## 2024-09-28 NOTE — ED ADULT NURSE NOTE - OBJECTIVE STATEMENT
75 y/o F PMH several spinal fusion surgeries, cervical stenosis, hypertension, gastroparesis, not on anticoagulation presenting with lower back pain and vomiting after a fall at 6 PM.  Son at bedside for collateral.  Patient says that she was in a parking lot and went to sit down on her rollator and it slipped out from behind her causing patient to hit her lower back on the rollator.  Patient is unsure if she hit her head but denies any LOC.  Patient started vomiting about an hour after she fell and has had 10 episodes of nonbloody nonbilious emesis.  Last episode was 1 hour prior to arrival in the emergency room.  No chest pain, shortness of breath, abdominal pain, weakness, saddle anesthesia, urinary retention/incontinence, fecal incontinence.  Patient has been able to ambulate since falling with severe pain. Pt A&Ox4, respirations even and unlabored on room air, moving all extremities easily, is ambulatory with steady gait observed. Appropriate safety measures in place, pt placed in position of comfort.

## 2024-09-28 NOTE — CONSULT NOTE ADULT - ASSESSMENT
HPI: [id number] on celecoxib for OA last took y’day otherwise no AC/AP h/o HTN, gastroparesis, peripheral neuropathy, multiple prior thoracolumbar spine surgeries for congenital scoliosis [place] w/ removal of hardware 1991 presents with back pain s/p mechanical fall from sitting y'day. Describes severe mid back pain and left hip pain, no numbness, weakness, saddle anesthesia, b/b symptoms. Pain worsened with movement, axial loading. CT TL spine w/ acute nondisplaced fracture of T12 veterbral body extending into right pedicle w/o clear cord impingement.    -MR T/L spine w/o con to eval ligamenous injury  -no c/i to DCU vs. med adm pending imaging for pain control  -further recs pending imaging

## 2024-09-28 NOTE — PROGRESS NOTE ADULT - SUBJECTIVE AND OBJECTIVE BOX
HOSPITAL COURSE:  74F on celecoxib for OA last took y’day otherwise no AC/AP h/o HTN, gastroparesis, peripheral neuropathy, multiple prior thoracolumbar spine surgeries for congenital scoliosis [place] >[place] state w/ removal of hardware 1991 presents with back pain s/p mechanical fall from sitting y'day. Describes severe mid back pain and left hip pain, no numbness, weakness, saddle anesthesia, b/b symptoms. Pain worsened with movement, axial loading. CT TL spine w/ acute nondisplaced fracture of T12 veterbral body extending into right pedicle w/o clear cord impingement.  Admission Scores  GCS: 15     24 hour Events:   adm to NSCU 9/28, did not tolerate flat for MRI     Allergies    penicillins (Urticaria)  Dilantin (Urticaria)    Intolerances    erythromycin (Stomach Upset; Vomiting; Nausea)      REVIEW OF SYSTEMS: [ ] Unable to Assess due to neurologic exam   [x] All ROS addressed below are non-contributory, except:  Neuro: [ ] Headache [ ] Back pain [ ] Numbness [ ] Weakness [ ] Ataxia [ ] Dizziness [ ] Aphasia [ ] Dysarthria [ ] Visual disturbance  Resp: [ ] Shortness of breath/dyspnea, [ ] Orthopnea [ ] Cough  CV: [ ] Chest pain [ ] Palpitation [ ] Lightheadedness [ ] Syncope  Renal: [ ] Thirst [ ] Edema  GI: [ ] Nausea [ ] Emesis [ ] Abdominal pain [ ] Constipation [ ] Diarrhea  Hem: [ ] Hematemesis [ ] bright red blood per rectum  ID: [ ] Fever [ ] Chills [ ] Dysuria  ENT: [ ] Rhinorrhea      DEVICES:   [ ] Restraints [ ] ET tube [ ] central line [] arterial line [ ] dumont [ ] NGT/OGT [ ] EVD [ ] LD [ ] RADHA/HMV [ ] Trach [ ] PEG [ ] Chest Tube     VITALS:   Vital Signs Last 24 Hrs  T(C): 37.4 (28 Sep 2024 18:07), Max: 37.4 (28 Sep 2024 18:07)  T(F): 99.4 (28 Sep 2024 18:07), Max: 99.4 (28 Sep 2024 18:07)  HR: 58 (28 Sep 2024 18:07) (57 - 75)  BP: 136/62 (28 Sep 2024 18:07) (111/67 - 183/103)  BP(mean): 89 (28 Sep 2024 18:07) (89 - 89)  RR: 19 (28 Sep 2024 18:07) (15 - 20)  SpO2: 92% (28 Sep 2024 18:07) (92% - 99%)    Parameters below as of 28 Sep 2024 18:07  Patient On (Oxygen Delivery Method): room air      CAPILLARY BLOOD GLUCOSE        I&O's Summary      Respiratory: on RA         LABS:                        11.9   9.53  )-----------( 250      ( 28 Sep 2024 03:27 )             37.4     09-28    136  |  97  |  30[H]  ----------------------------<  175[H]  3.3[L]   |  25  |  0.80             MEDICATION LEVELS:     IVF FLUIDS/MEDICATIONS:   MEDICATIONS  (STANDING):  atorvastatin 40 milliGRAM(s) Oral at bedtime  chlorhexidine 4% Liquid 1 Application(s) Topical <User Schedule>  DULoxetine 20 milliGRAM(s) Oral daily  famotidine    Tablet 40 milliGRAM(s) Oral two times a day  losartan 100 milliGRAM(s) Oral daily  pramipexole 1 milliGRAM(s) Oral at bedtime  senna 1 Tablet(s) Oral daily  sodium chloride 0.9%. 1000 milliLiter(s) (70 mL/Hr) IV Continuous <Continuous>  sucralfate suspension 1 Gram(s) Oral every 6 hours    MEDICATIONS  (PRN):  acetaminophen     Tablet .. 325 milliGRAM(s) Oral every 4 hours PRN Temp greater or equal to 38C (100.4F), Mild Pain (1 - 3), Moderate Pain (4 - 6)  HYDROmorphone  Injectable 0.5 milliGRAM(s) IV Push every 6 hours PRN Severe Pain (7 - 10)  methocarbamol 500 milliGRAM(s) Oral every 8 hours PRN Muscle Spasm  ondansetron   Disintegrating Tablet 4 milliGRAM(s) Oral every 6 hours PRN Nausea        IMAGING: < from: CT Lumbar Spine Reform No Cont (09.28.24 @ 05:48) >    ACC: [id number] EXAM:  CT REFORM SPINE L   ORDERED BY: JOSEPH CORTES     ACC: [id number] EXAM:  CT REFORM SPINE T   ORDERED BY: [employer]     ACC: [id number] EXAM:  CT CHEST   ORDERED BY: JOSEPH [facility]     ACC: [id number] EXAM:  CT ABDOMEN AND PELVIS   ORDERED BY: JOSEPH [email or web address]     PROCEDURE DATE:  09/28/2024          INTERPRETATION:  CLINICAL INDICATION: trauma    PROCEDURE: CT of the chest, abdomen and pelvis was performed without   intravenous contrast. Coronal and sagittal reconstruction images were   obtained. Reformatted images of the thoracic and lumbar spine were   obtained.    CONTRAST/COMPLICATIONS:  IV Contrast: NONE  Oral Contrast: NONE  Complications: None reported at time of study completion    COMPARISON: 7/23/2024.    FINDINGS: Evaluation of the thoracic, abdominal and pelvic organs and   vasculature is limited without intravenous contrast. Patient's   respiratory motion degrades images.    CHEST:    LUNGS AND AIRWAYS: Patent central airways. Stable tiny focal outpouching   of air at the right proximal mainstem bronchus near the royal.   Atelectasis. Right lower lobe opacity posteriorly.  PLEURA: Suggest trace right pleural effusion.  HEART: Stable heart size. Trace pericardial effusion. Coronary artery   calcification.  VESSELS: Atherosclerosis. Normal caliber of the thoracic aorta.  MEDIASTINUM AND PILAR: Subcentimeter lymph nodes.  CHEST WALL AND LOWER NECK: Somewhat nodular appearance of the right   breast soft tissue again noted.    ABDOMEN/PELVIS:    LIVER:Stable cyst.  BILE DUCTS/GALLBLADDER: No intrahepatic or extrahepatic biliary   dilatation. No obvious radiopaque gallstone.  PANCREAS: Unremarkable.  SPLEEN: Unremarkable.    ADRENALS: Unremarkable.  KIDNEYS/URETERS: Bilateral perinephric stranding without   hydroureteronephrosis.  BLADDER: Distended.  REPRODUCTIVE ORGANS: Hysterectomy.    BOWEL: Air/debris/fluid in the visualized esophagus. Small hiatal hernia.   No bowel obstruction. Unremarkable appendix. Colon diverticulosis.   Underdistended left colon.  PERITONEUM: No organized fluid collection or free air.  VESSELS: Atherosclerosis. Normal caliber of the abdominal aorta.  RETROPERITONEUM/LYMPH NODE: No lymphadenopathy. Paraspinal stranding.  ABDOMINAL WALL/SOFT TISSUES: Abdominal walldiastasis. Small   fat-containing umbilical hernia. Heterogenous spinal canal at the level   of the fracture, which is difficult to assess due to artifact.    BONES: Decreased bone mineralization. Acute fracture involving the   anterior to posterior elements of T12 with fracture gap of   1.2 cm in cc dimension. Fracture extending to the left T12-L1 facet joint   and right 12th posterior rib near the costovertebral junction. Mild   anterior displacement of the inferior aspect of the T12 vertebral body   with respect to the T12 superior endplate. Multilevel facet/interspinous   and vertebral body fusion and degenerative changes again noted. Ghost   tracts in bilateral sacrum. Widened sacroiliac joints again noted.    Acute, minimally displaced fractures of the left eighth lateral rib.   Postsurgical changes of the left 10th rib. Chronic deformities of   bilateral fourth through ninth lateral ribs. Bilateral total hip   arthroplasty with incompletely visualized femoral component. Right   shoulder arthroplasty. Hardware artifact degrading images.    IMPRESSION:    Evaluation of the thoracic, abdominal and pelvic organs and vasculature   is limited without intravenous contrast.    T12 three column spine fracture-malalignment with fracture extending to   the adjacent right 12th posterior rib and left T12-L1 facet joint.   Heterogenous spinal canal at the level of the fracture, which is   difficult to assess due to artifact. Recommend MR to assess for and/or   soft tissue/ligamentous-cord injury.    Acute, displaced fracture of the left eighth lateral rib. Chronic   appearing bilateral rib deformities. No pneumothorax. Dependent right   lower lobe opacity, which may be due to atelectasis although pulmonary   contusion is not excludedgiven adjacent injury.    Additional findings as described.    Discussed major findings with Dr. Cortes on 9/28/2024 6:25 AM.    --- End of Report ---            NAMITA XIAO MD; Attending Radiologist  This document has been electronically signed. Sep28 2024  6:41AM    < end of copied text >        EXAMINATION:  PHYSICAL EXAM:    Constitutional: No Acute Distress     Neurological: Awake, alert oriented to person, place and time, Following Commands, PERRL, EOMI, No Gaze Preference, Face Symmetrical, Speech Fluent, No dysmetria, No ataxia, No nystagmus RIVERS 5/5                                                  Sensation: [ ] intact to light touch  [x ] decreased: bilateral lowers     Pulmonary: Clear to Auscultation, No rales, No rhonchi, No wheezes     Cardiovascular: S1, S2, Regular rate and rhythm     Gastrointestinal: Soft, Non-tender, Non-distended     Extremities: No calf tenderness      HOSPITAL COURSE:  74F on celecoxib for OA last took y’day otherwise no AC/AP h/o HTN, gastroparesis, peripheral neuropathy, multiple prior thoracolumbar spine surgeries for congenital scoliosis done >10 years ago out of state w/ removal of hardware 1991 presents with back pain s/p mechanical fall from sitting y'day. Describes severe mid back pain and left hip pain, no numbness, weakness, saddle anesthesia, b/b symptoms. Pain worsened with movement, axial loading. CT TL spine w/ acute nondisplaced fracture of T12 veterbral body extending into right pedicle w/o clear cord impingement.  Admission Scores  GCS: 15     24 hour Events:   adm to NSCU 9/28, did not tolerate flat for MRI     Allergies    penicillins (Urticaria)  Dilantin (Urticaria)    Intolerances    erythromycin (Stomach Upset; Vomiting; Nausea)      REVIEW OF SYSTEMS: [ ] Unable to Assess due to neurologic exam   [x] All ROS addressed below are non-contributory, except:  Neuro: [ ] Headache [ ] Back pain [ ] Numbness [ ] Weakness [ ] Ataxia [ ] Dizziness [ ] Aphasia [ ] Dysarthria [ ] Visual disturbance  Resp: [ ] Shortness of breath/dyspnea, [ ] Orthopnea [ ] Cough  CV: [ ] Chest pain [ ] Palpitation [ ] Lightheadedness [ ] Syncope  Renal: [ ] Thirst [ ] Edema  GI: [ ] Nausea [ ] Emesis [ ] Abdominal pain [ ] Constipation [ ] Diarrhea  Hem: [ ] Hematemesis [ ] bright red blood per rectum  ID: [ ] Fever [ ] Chills [ ] Dysuria  ENT: [ ] Rhinorrhea      DEVICES:   [ ] Restraints [ ] ET tube [ ] central line [] arterial line [x ] dumont [ ] NGT/OGT [ ] EVD [ ] LD [ ] RADHA/HMV [ ] Trach [ ] PEG [ ] Chest Tube     VITALS:   Vital Signs Last 24 Hrs  T(C): 37.4 (28 Sep 2024 18:07), Max: 37.4 (28 Sep 2024 18:07)  T(F): 99.4 (28 Sep 2024 18:07), Max: 99.4 (28 Sep 2024 18:07)  HR: 58 (28 Sep 2024 18:07) (57 - 75)  BP: 136/62 (28 Sep 2024 18:07) (111/67 - 183/103)  BP(mean): 89 (28 Sep 2024 18:07) (89 - 89)  RR: 19 (28 Sep 2024 18:07) (15 - 20)  SpO2: 92% (28 Sep 2024 18:07) (92% - 99%)    Parameters below as of 28 Sep 2024 18:07  Patient On (Oxygen Delivery Method): room air      CAPILLARY BLOOD GLUCOSE        I&O's Summary      Respiratory: on RA         LABS:                        11.9   9.53  )-----------( 250      ( 28 Sep 2024 03:27 )             37.4     09-28    136  |  97  |  30[H]  ----------------------------<  175[H]  3.3[L]   |  25  |  0.80             MEDICATION LEVELS:     IVF FLUIDS/MEDICATIONS:   MEDICATIONS  (STANDING):  atorvastatin 40 milliGRAM(s) Oral at bedtime  chlorhexidine 4% Liquid 1 Application(s) Topical <User Schedule>  DULoxetine 20 milliGRAM(s) Oral daily  famotidine    Tablet 40 milliGRAM(s) Oral two times a day  losartan 100 milliGRAM(s) Oral daily  pramipexole 1 milliGRAM(s) Oral at bedtime  senna 1 Tablet(s) Oral daily  sodium chloride 0.9%. 1000 milliLiter(s) (70 mL/Hr) IV Continuous <Continuous>  sucralfate suspension 1 Gram(s) Oral every 6 hours    MEDICATIONS  (PRN):  acetaminophen     Tablet .. 325 milliGRAM(s) Oral every 4 hours PRN Temp greater or equal to 38C (100.4F), Mild Pain (1 - 3), Moderate Pain (4 - 6)  HYDROmorphone  Injectable 0.5 milliGRAM(s) IV Push every 6 hours PRN Severe Pain (7 - 10)  methocarbamol 500 milliGRAM(s) Oral every 8 hours PRN Muscle Spasm  ondansetron   Disintegrating Tablet 4 milliGRAM(s) Oral every 6 hours PRN Nausea        IMAGING: < from: CT Lumbar Spine Reform No Cont (09.28.24 @ 05:48) >    ACC: [id number] EXAM:  CT REFORM SPINE L   ORDERED BY: JOSEPH CORTES     ACC: [id number] EXAM:  CT REFORM SPINE T   ORDERED BY: [employer]     ACC: [id number] EXAM:  CT CHEST   ORDERED BY: [employer]     ACC: [id number] EXAM:  CT ABDOMEN AND PELVIS   ORDERED BY: JOSEPH MASS[facility]IA     PROCEDURE DATE:  09/28/2024          INTERPRETATION:  CLINICAL INDICATION: trauma    PROCEDURE: CT of the chest, abdomen and pelvis was performed without   intravenous contrast. Coronal and sagittal reconstruction images were   obtained. Reformatted images of the thoracic and lumbar spine were   obtained.    CONTRAST/COMPLICATIONS:  IV Contrast: NONE  Oral Contrast: NONE  Complications: None reported at time of study completion    COMPARISON: 7/23/2024.    FINDINGS: Evaluation of the thoracic, abdominal and pelvic organs and   vasculature is limited without intravenous contrast. Patient's   respiratory motion degrades images.    CHEST:    LUNGS AND AIRWAYS: Patent central airways. Stable tiny focal outpouching   of air at the right proximal mainstem bronchus near the royal.   Atelectasis. Right lower lobe opacity posteriorly.  PLEURA: Suggest trace right pleural effusion.  HEART: Stable heart size. Trace pericardial effusion. Coronary artery   calcification.  VESSELS: Atherosclerosis. Normal caliber of the thoracic aorta.  MEDIASTINUM AND PILAR: Subcentimeter lymph nodes.  CHEST WALL AND LOWER NECK: Somewhat nodular appearance of the right   breast soft tissue again noted.    ABDOMEN/PELVIS:    LIVER:Stable cyst.  BILE DUCTS/GALLBLADDER: No intrahepatic or extrahepatic biliary   dilatation. No obvious radiopaque gallstone.  PANCREAS: Unremarkable.  SPLEEN: Unremarkable.    ADRENALS: Unremarkable.  KIDNEYS/URETERS: Bilateral perinephric stranding without   hydroureteronephrosis.  BLADDER: Distended.  REPRODUCTIVE ORGANS: Hysterectomy.    BOWEL: Air/debris/fluid in the visualized esophagus. Small hiatal hernia.   No bowel obstruction. Unremarkable appendix. Colon diverticulosis.   Underdistended left colon.  PERITONEUM: No organized fluid collection or free air.  VESSELS: Atherosclerosis. Normal caliber of the abdominal aorta.  RETROPERITONEUM/LYMPH NODE: No lymphadenopathy. Paraspinal stranding.  ABDOMINAL WALL/SOFT TISSUES: Abdominal walldiastasis. Small   fat-containing umbilical hernia. Heterogenous spinal canal at the level   of the fracture, which is difficult to assess due to artifact.    BONES: Decreased bone mineralization. Acute fracture involving the   anterior to posterior elements of T12 with fracture gap of   1.2 cm in cc dimension. Fracture extending to the left T12-L1 facet joint   and right 12th posterior rib near the costovertebral junction. Mild   anterior displacement of the inferior aspect of the T12 vertebral body   with respect to the T12 superior endplate. Multilevel facet/interspinous   and vertebral body fusion and degenerative changes again noted. Ghost   tracts in bilateral sacrum. Widened sacroiliac joints again noted.    Acute, minimally displaced fractures of the left eighth lateral rib.   Postsurgical changes of the left 10th rib. Chronic deformities of   bilateral fourth through ninth lateral ribs. Bilateral total hip   arthroplasty with incompletely visualized femoral component. Right   shoulder arthroplasty. Hardware artifact degrading images.    IMPRESSION:    Evaluation of the thoracic, abdominal and pelvic organs and vasculature   is limited without intravenous contrast.    T12 three column spine fracture-malalignment with fracture extending to   the adjacent right 12th posterior rib and left T12-L1 facet joint.   Heterogenous spinal canal at the level of the fracture, which is   difficult to assess due to artifact. Recommend MR to assess for and/or   soft tissue/ligamentous-cord injury.    Acute, displaced fracture of the left eighth lateral rib. Chronic   appearing bilateral rib deformities. No pneumothorax. Dependent right   lower lobe opacity, which may be due to atelectasis although pulmonary   contusion is not excludedgiven adjacent injury.    Additional findings as described.    Discussed major findings with Dr. Cortes on 9/28/2024 6:25 AM.    --- End of Report ---            NAMITA XIAO MD; Attending Radiologist  This document has been electronically signed. Sep28 2024  6:41AM    < end of copied text >        EXAMINATION:  PHYSICAL EXAM:    Constitutional: No Acute Distress     Neurological: Awake, alert oriented to person, place and time, Following Commands, PERRL, EOMI, No Gaze Preference, Face Symmetrical, Speech Fluent, No dysmetria, No ataxia, No nystagmus RIVERS 5/5                                                  Sensation: [ ] intact to light touch  [x ] decreased: bilateral lowers     Pulmonary: Clear to Auscultation, No rales, No rhonchi, No wheezes     Cardiovascular: S1, S2, Regular rate and rhythm     Gastrointestinal: Soft, Non-tender, Non-distended     Extremities: No calf tenderness      HOSPITAL COURSE:  74F on celecoxib for OA last took y’day otherwise no AC/AP h/o HTN, gastroparesis, peripheral neuropathy, multiple prior thoracolumbar spine surgeries for congenital scoliosis done >[place] w/ removal of hardware 1991 presents with back pain s/p mechanical fall from sitting y'day. Describes severe mid back pain and left hip pain, no numbness, weakness, saddle anesthesia, b/b symptoms. Pain worsened with movement, axial loading. CT TL spine w/ acute nondisplaced fracture of T12 veterbral body extending into right pedicle w/o clear cord impingement.  Admission Scores  GCS: 15     24 hour Events:   adm to NSCU 9/28, did not tolerate flat for MRI     Allergies    penicillins (Urticaria)  Dilantin (Urticaria)    Intolerances    erythromycin (Stomach Upset; Vomiting; Nausea)      REVIEW OF SYSTEMS: [ ] Unable to Assess due to neurologic exam   [x] All ROS addressed below are non-contributory, except:  Neuro: [ ] Headache [ ] Back pain [ ] Numbness [ ] Weakness [ ] Ataxia [ ] Dizziness [ ] Aphasia [ ] Dysarthria [ ] Visual disturbance  Resp: [ ] Shortness of breath/dyspnea, [ ] Orthopnea [ ] Cough  CV: [ ] Chest pain [ ] Palpitation [ ] Lightheadedness [ ] Syncope  Renal: [ ] Thirst [ ] Edema  GI: [ ] Nausea [ ] Emesis [ ] Abdominal pain [ ] Constipation [ ] Diarrhea  Hem: [ ] Hematemesis [ ] bright red blood per rectum  ID: [ ] Fever [ ] Chills [ ] Dysuria  ENT: [ ] Rhinorrhea      DEVICES:   [ ] Restraints [ ] ET tube [ ] central line [] arterial line [x ] dumont [ ] NGT/OGT [ ] EVD [ ] LD [ ] RADHA/HMV [ ] Trach [ ] PEG [ ] Chest Tube     VITALS:   Vital Signs Last 24 Hrs  T(C): 37.4 (28 Sep 2024 18:07), Max: 37.4 (28 Sep 2024 18:07)  T(F): 99.4 (28 Sep 2024 18:07), Max: 99.4 (28 Sep 2024 18:07)  HR: 58 (28 Sep 2024 18:07) (57 - 75)  BP: 136/62 (28 Sep 2024 18:07) (111/67 - 183/103)  BP(mean): 89 (28 Sep 2024 18:07) (89 - 89)  RR: 19 (28 Sep 2024 18:07) (15 - 20)  SpO2: 92% (28 Sep 2024 18:07) (92% - 99%)    Parameters below as of 28 Sep 2024 18:07  Patient On (Oxygen Delivery Method): room air      CAPILLARY BLOOD GLUCOSE        I&O's Summary      Respiratory: on RA         LABS:                        11.9   9.53  )-----------( 250      ( 28 Sep 2024 03:27 )             37.4     09-28    136  |  97  |  30[H]  ----------------------------<  175[H]  3.3[L]   |  25  |  0.80             MEDICATION LEVELS:     IVF FLUIDS/MEDICATIONS:   MEDICATIONS  (STANDING):  atorvastatin 40 milliGRAM(s) Oral at bedtime  chlorhexidine 4% Liquid 1 Application(s) Topical <User Schedule>  DULoxetine 20 milliGRAM(s) Oral daily  famotidine    Tablet 40 milliGRAM(s) Oral two times a day  losartan 100 milliGRAM(s) Oral daily  pramipexole 1 milliGRAM(s) Oral at bedtime  senna 1 Tablet(s) Oral daily  sodium chloride 0.9%. 1000 milliLiter(s) (70 mL/Hr) IV Continuous <Continuous>  sucralfate suspension 1 Gram(s) Oral every 6 hours    MEDICATIONS  (PRN):  acetaminophen     Tablet .. 325 milliGRAM(s) Oral every 4 hours PRN Temp greater or equal to 38C (100.4F), Mild Pain (1 - 3), Moderate Pain (4 - 6)  HYDROmorphone  Injectable 0.5 milliGRAM(s) IV Push every 6 hours PRN Severe Pain (7 - 10)  methocarbamol 500 milliGRAM(s) Oral every 8 hours PRN Muscle Spasm  ondansetron   Disintegrating Tablet 4 milliGRAM(s) Oral every 6 hours PRN Nausea        IMAGING: < from: CT Lumbar Spine Reform No Cont (09.28.24 @ 05:48) >    ACC: [id number] EXAM:  CT REFORM SPINE L   ORDERED BY: JOSEPH CORTES     ACC: [id number] EXAM:  CT REFORM SPINE T   ORDERED BY: [employer]     ACC: [id number] EXAM:  CT CHEST   ORDERED BY: [employer]     ACC: [id number] EXAM:  CT ABDOMEN AND PELVIS   ORDERED BY: JOSEPH MASSONIA     PROCEDURE DATE:  09/28/2024          INTERPRETATION:  CLINICAL INDICATION: trauma    PROCEDURE: CT of the chest, abdomen and pelvis was performed without   intravenous contrast. Coronal and sagittal reconstruction images were   obtained. Reformatted images of the thoracic and lumbar spine were   obtained.    CONTRAST/COMPLICATIONS:  IV Contrast: NONE  Oral Contrast: NONE  Complications: None reported at time of study completion    COMPARISON: 7/23/2024.    FINDINGS: Evaluation of the thoracic, abdominal and pelvic organs and   vasculature is limited without intravenous contrast. Patient's   respiratory motion degrades images.    CHEST:    LUNGS AND AIRWAYS: Patent central airways. Stable tiny focal outpouching   of air at the right proximal mainstem bronchus near the royal.   Atelectasis. Right lower lobe opacity posteriorly.  PLEURA: Suggest trace right pleural effusion.  HEART: Stable heart size. Trace pericardial effusion. Coronary artery   calcification.  VESSELS: Atherosclerosis. Normal caliber of the thoracic aorta.  MEDIASTINUM AND PILAR: Subcentimeter lymph nodes.  CHEST WALL AND LOWER NECK: Somewhat nodular appearance of the right   breast soft tissue again noted.    ABDOMEN/PELVIS:    LIVER:Stable cyst.  BILE DUCTS/GALLBLADDER: No intrahepatic or extrahepatic biliary   dilatation. No obvious radiopaque gallstone.  PANCREAS: Unremarkable.  SPLEEN: Unremarkable.    ADRENALS: Unremarkable.  KIDNEYS/URETERS: Bilateral perinephric stranding without   hydroureteronephrosis.  BLADDER: Distended.  REPRODUCTIVE ORGANS: Hysterectomy.    BOWEL: Air/debris/fluid in the visualized esophagus. Small hiatal hernia.   No bowel obstruction. Unremarkable appendix. Colon diverticulosis.   Underdistended left colon.  PERITONEUM: No organized fluid collection or free air.  VESSELS: Atherosclerosis. Normal caliber of the abdominal aorta.  RETROPERITONEUM/LYMPH NODE: No lymphadenopathy. Paraspinal stranding.  ABDOMINAL WALL/SOFT TISSUES: Abdominal walldiastasis. Small   fat-containing umbilical hernia. Heterogenous spinal canal at the level   of the fracture, which is difficult to assess due to artifact.    BONES: Decreased bone mineralization. Acute fracture involving the   anterior to posterior elements of T12 with fracture gap of   1.2 cm in cc dimension. Fracture extending to the left T12-L1 facet joint   and right 12th posterior rib near the costovertebral junction. Mild   anterior displacement of the inferior aspect of the T12 vertebral body   with respect to the T12 superior endplate. Multilevel facet/interspinous   and vertebral body fusion and degenerative changes again noted. Ghost   tracts in bilateral sacrum. Widened sacroiliac joints again noted.    Acute, minimally displaced fractures of the left eighth lateral rib.   Postsurgical changes of the left 10th rib. Chronic deformities of   bilateral fourth through ninth lateral ribs. Bilateral total hip   arthroplasty with incompletely visualized femoral component. Right   shoulder arthroplasty. Hardware artifact degrading images.    IMPRESSION:    Evaluation of the thoracic, abdominal and pelvic organs and vasculature   is limited without intravenous contrast.    T12 three column spine fracture-malalignment with fracture extending to   the adjacent right 12th posterior rib and left T12-L1 facet joint.   Heterogenous spinal canal at the level of the fracture, which is   difficult to assess due to artifact. Recommend MR to assess for and/or   soft tissue/ligamentous-cord injury.    Acute, displaced fracture of the left eighth lateral rib. Chronic   appearing bilateral rib deformities. No pneumothorax. Dependent right   lower lobe opacity, which may be due to atelectasis although pulmonary   contusion is not excludedgiven adjacent injury.    Additional findings as described.    Discussed major findings with Dr. Cortes on 9/28/2024 6:25 AM.    --- End of Report ---            NAMITA XIAO MD; Attending Radiologist  This document has been electronically signed. Sep28 2024  6:41AM    < end of copied text >        EXAMINATION:  PHYSICAL EXAM:    Constitutional: No Acute Distress     Neurological: Awake, alert oriented to person, place and time, Following Commands, PERRL, EOMI, No Gaze Preference, Face Symmetrical, Speech Fluent, No dysmetria, No ataxia, No nystagmus RIVERS strong antigravity, pain limited                                                  Sensation: [ ] intact to light touch  [x ] decreased: bilateral lowers stocking glove distribution     Pulmonary: Clear to Auscultation, No rales, No rhonchi, No wheezes     Cardiovascular: S1, S2, Regular rate and rhythm     Gastrointestinal: Soft, Non-tender, Non-distended     Extremities: No calf tenderness

## 2024-09-28 NOTE — H&P ADULT - NSHPPHYSICALEXAM_GEN_ALL_CORE
Awake, Ox3, BUE 5/5, BLE HF 5/5, KF/KE 5/5, PF/DF 5/5. Mildly decreased sensation BLE stocking distribution up to ankles stable compared to prior to fall. No axial TTP, no palpable step offs or deformities.

## 2024-09-28 NOTE — PROGRESS NOTE ADULT - ASSESSMENT
ASSESSMENT: HPI:  74F on celecoxib for OA last took y’day otherwise no AC/AP h/o HTN, gastroparesis, peripheral neuropathy, multiple prior thoracolumbar spine surgeries for congenital scoliosis [place] >[place] w/ removal of hardware 1991 presents with back pain s/p mechanical fall from sitting y'day. Describes severe mid back pain and left hip pain, no numbness, weakness, saddle anesthesia, b/b symptoms. Pain worsened with movement, axial loading. CT TL spine w/ acute nondisplaced fracture of T12 veterbral body extending into right pedicle w/o clear cord impingement. (28 Sep 2024 15:45)    adm 9/28 for 3 column fracture of T 12  NEURO:  neurochecks q1 hour   CT thoracix/lumbar done  MR aborted as patient did not tolerate flat 2/2 to pain  Pain control with tylenol, oxy and Dilaudid prn   strict bed rest   spine precautions, log role permitted   continue home cymbalta, robaxin and pramipexole   Activity: [] OOB as tolerated [x] Bedrest [] PT [] OT [] PMNR    PULM:  Incentive spirometry, mobilize as tolerated  on room air   sat >92%     CV:  maintain MAP> 85 for spinal cord perfusion   continue home losartan  continue home lipitor     RENAL:  IVF while NPO   monitor i/os     GI:  Diet: NPO preop for OR in AM   GI prophylaxis [] not indicated [] PPI [x] other: pepcid, home medication   Bowel regimen [] colace [x] senna [x] other: senna, miralax   continue home sucralfate     ENDO:   Goal euglycemia (-180)    HEME/ONC:  VTE prophylaxis: [x] SCDs [] chemoprophylaxis [x] hold chemoprophylaxis due to: preop in AM [] high risk of DVT/PE on admission due to:  LED -p     ID:  afebrile   monitor fever curve     MISC:    SOCIAL/FAMILY:  [x] awaiting [] updated at bedside [] family meeting    CODE STATUS:  [x] Full Code [] DNR [] DNI [] Palliative/Comfort Care    DISPOSITION:  [x] ICU [] Stroke Unit [] Floor [] EMU [] RCU [] PCU    [x] Patient is at high risk of neurologic deterioration/death due to: paraplegia    Time seen:  Time spent: __35_ [x] critical care minutes    Contact: [phone] ASSESSMENT: HPI:  74F on celecoxib for OA last took y’day otherwise no AC/AP h/o HTN, gastroparesis, peripheral neuropathy, multiple prior thoracolumbar spine surgeries for congenital scoliosis done >10 years ago [place] of state w/ removal of hardware 1991 presents with back pain s/p mechanical fall from sitting y'day. Describes severe mid back pain and left hip pain, no numbness, weakness, saddle anesthesia, b/b symptoms. Pain worsened with movement, axial loading. CT TL spine w/ acute nondisplaced fracture of T12 veterbral body extending into right pedicle w/o clear cord impingement. (28 Sep 2024 15:45)    adm 9/28 for 3 column fracture of T 12  NEURO:  neurochecks q1 hour   MAP goals >85   CT thoracix/lumbar done  MR aborted as patient did not tolerate flat 2/2 to pain  Pain control with tylenol, oxy and Dilaudid prn   pending PCA pump for pain   strict bed rest   spine precautions, log role permitted   continue home cymbalta, robaxin and pramipexole   Activity: [] OOB as tolerated [x] Bedrest [] PT [] OT [] PMNR    PULM:  Incentive spirometry  on room air   sat >92%     CV:  maintain MAP> 85 for spinal cord perfusion   started on levo   honme losartan held for MAP goals   continue home lipitor     RENAL:  IVF while NPO   NS @ 70cc   monitor i/os     GI:  h/o GERD   Diet: NPO preop for OR in AM   GI prophylaxis [] not indicated [] PPI [x] other: pepcid, home medication   Bowel regimen [] colace [x] senna [x] other: senna, miralax, dulcolax   continue home sucralfate     ENDO:   Goal euglycemia (-180)    HEME/ONC:  VTE prophylaxis: [x] SCDs [] chemoprophylaxis [x] hold chemoprophylaxis due to: preop in AM [] high risk of DVT/PE on admission due to:  LED -p     ID:  afebrile   monitor fever curve     MISC:    SOCIAL/FAMILY:  [x] awaiting [] updated at bedside [] family meeting    CODE STATUS:  [x] Full Code [] DNR [] DNI [] Palliative/Comfort Care    DISPOSITION:  [x] ICU [] Stroke Unit [] Floor [] EMU [] RCU [] PCU    [x] Patient is at high risk of neurologic deterioration/death due to: paraplegia    Time seen:  Time spent: __35_ [x] critical care minutes    Contact: [phone]

## 2024-09-28 NOTE — CONSULT NOTE ADULT - ASSESSMENT
74 female past medical history several spinal fusion surgeries, cervical stenosis, hypertension, gastroparesis s/p vagotomy not on anticoagulation presenting with lower back pain and vomiting after a fall at 6 PM. Patient reports no LOC. CT head no evidence of bleeding or fracture. Surgery consulted for management of rib fracture/ concern for pulmonary contusion.     List of injuries:   - T12 three column spine fracture-malalignment with fracture extending to   the adjacent right 12th posterior rib and left T12-L1 facet joint  - Acute, displaced fracture of the left eighth lateral rib    Plan   - Multimodal pain control - Tylenol 975 mg PO q8 x 48 hrs switch to Tylenol 500 mg PO q6, Lidoderm patch 12h on/ 12 hr off  - PRN Tramadol 25 mg q6-8 for moderate pain, Tramadol 50 mg q6-8 PRN for sever pain   - AM chest xray   - f/u spine/ortho for admission regarding T12 -L1 fracture , patient already following with orth     Discussed with Dr. Mast    Trauma surgery   [id number]       74 female past medical history several spinal fusion surgeries, cervical stenosis, hypertension, gastroparesis s/p vagotomy not on anticoagulation presenting with lower back pain and vomiting after a fall at 6 PM. Patient reports no LOC. CT head no evidence of bleeding or fracture. Surgery consulted for management of rib fracture/ concern for pulmonary contusion. Patient on RA saturating well.     List of injuries:   - T12 three column spine fracture-malalignment with fracture extending to   the adjacent right 12th posterior rib and left T12-L1 facet joint  - Acute, displaced fracture of the left eighth lateral rib    Plan   - Multimodal pain control - Tylenol 975 mg PO q8 x 48 hrs switch to Tylenol 500 mg PO q6, Lidoderm patch 12h on/ 12 hr off  - PRN Tramadol 25 mg q6-8 for moderate pain, Tramadol 50 mg q6-8 PRN for sever pain   - AM chest xray   - f/u spine/ortho for admission regarding T12 -L1 fracture , patient already following with orth     Discussed with Dr. Mast    Trauma surgery   [id number]

## 2024-09-28 NOTE — CONSULT NOTE ADULT - SUBJECTIVE AND OBJECTIVE BOX
GENERAL SURGERY CONSULT NOTE    HPI: 74 female past medical history several spinal fusion surgeries, cervical stenosis, hypertension, gastroparesis s/p vagotomy not on anticoagulation presenting with lower back pain and vomiting after a fall at 6 PM.  Son at bedside for collateral.  Patient says that she was in a parking lot and went to sit down on her rollator and it slipped out from behind her causing patient to hit her lower back on the rollator.  Patient is unsure if she hit her head but denies any LOC.  Patient started vomiting about an hour after she fell and has had 10 episodes of nonbloody nonbilious emesis.  Last episode was 1 hour prior to arrival in the emergency room.  No chest pain, shortness of breath, abdominal pain, weakness, saddle anesthesia, urinary retention/incontinence, fecal incontinence.  Patient has been able to ambulate since falling.      PMH/PSH: H/O seasonal allergies    History of pulmonary embolism    Restless leg syndrome    GERD (gastroesophageal reflux disease)    OA (osteoarthritis)    Fungal infection of skin    H/O scoliosis    Essential hypertension    Depression    Osteoporosis    Right hip pain    2019 novel coronavirus disease (COVID-19)    Hiatal hernia    History of chronic pain    Peripheral neuropathy    S/P repair of paraesophageal hernia    S/P spinal fusion    Scoliosis    S/P spinal surgery    Removal of pin, plate, abigail, or screw    S/P hysterectomy    S/P hip replacement    S/P shoulder surgery    S/P dilatation and curettage    H/O arthroscopy of knee    S/P cataract surgery    History of bilateral hip replacements      MEDS:    ALLERGIES: NKDA    REVIEW OF SYSTEMS: All ROS negative except as per HPI.  ____________________________________________    VITALS:T(C): 36.7, Max: 36.8 (09-28)  T(F): 98.1, Max: 98.2 (09-28)  HR: 67 (57 - 67)  BP: 135/73 (135/73 - 183/103)  BP(mean): --  RR: 15 (15 - 20)  SpO2: 97% (96% - 99%) room air         PHYSICAL EXAM:  General: AAOx3, no acute distress.  Respiratory: breathing comfortably, no increased WOB   Abdomen: soft, nontender, nondistended, no rebound, no guarding  Extremities: No motor or sensory deficit in CHERYL/LL extremities, no cervical spine tenderness or step offs, no thoracic or lumbar spine tenderness or step offs, no TTP over sternum or left/right ribs.   ____________________________________________    LABS:                      11.9  9.53 )-----------( 250    ( 28 Sep 2024 03:27 )             37.4  136  |  97  |  30[H]  ----------------------------<  175[H]    (09-28)  3.3[L]   |  25  |  0.80          Ca    9.8      09-28  Mg    x  Phos  x        LIVER FUNCTIONS - ( 28 Sep 2024 03:27 )  Alb: 4.3 g/dL / Pro: 7.3 g/dL / ALK PHOS: 185 U/L / ALT: 24 U/L / AST: 23 U/L / GGT: x      PT/INR - ( 28 Sep 2024 03:27 )   PT: 11.3 sec;   INR: 0.98 ratio         PTT - ( 28 Sep 2024 03:27 )  PTT:27.8 sec  Urinalysis Basic - ( 28 Sep 2024 03:27 )    Color: x / Appearance: x / SG: x / pH: x  Gluc: 175 mg/dL / Ketone: x  / Bili: x / Urobili: x   Blood: x / Protein: x / Nitrite: x   Leuk Esterase: x / RBC: x / WBC x   Sq Epi: x / Non Sq Epi: x / Bacteria: x      ____________________________________________    RADIOLOGY & ADDITIONAL STUDIES:

## 2024-09-28 NOTE — ED ADULT NURSE NOTE - NSFALLHARMRISKINTERV_ED_ALL_ED
Assistance OOB with selected safe patient handling equipment if applicable/Assistance with ambulation/Communicate risk of Fall with Harm to all staff, patient, and family/Monitor gait and stability/Provide visual cue: red socks, yellow wristband, yellow gown, etc/Reinforce activity limits and safety measures with patient and family/Bed in lowest position, wheels locked, appropriate side rails in place/Call bell, personal items and telephone in reach/Instruct patient to call for assistance before getting out of bed/chair/stretcher/Non-slip footwear applied when patient is off stretcher/[place] to call system/Physically safe environment - no spills, clutter or unnecessary equipment/Purposeful Proactive Rounding/Room/bathroom lighting operational, light cord in reach

## 2024-09-28 NOTE — ED ADULT TRIAGE NOTE - WEIGHT METHOD
Well Visit, Ages 25 to 48: Care Instructions  Your Care Instructions  Physical exams can help you stay healthy. Your doctor has checked your overall health and may have suggested ways to take good care of yourself. He or she also may have recommended tests. At home, you can help prevent illness with healthy eating, regular exercise, and other steps. Follow-up care is a key part of your treatment and safety. Be sure to make and go to all appointments, and call your doctor if you are having problems. It's also a good idea to know your test results and keep a list of the medicines you take. How can you care for yourself at home? · Reach and stay at a healthy weight. This will lower your risk for many problems, such as obesity, diabetes, heart disease, and high blood pressure. · Get at least 30 minutes of physical activity on most days of the week. Walking is a good choice. You also may want to do other activities, such as running, swimming, cycling, or playing tennis or team sports. Discuss any changes in your exercise program with your doctor. · Do not smoke or allow others to smoke around you. If you need help quitting, talk to your doctor about stop-smoking programs and medicines. These can increase your chances of quitting for good. · Talk to your doctor about whether you have any risk factors for sexually transmitted infections (STIs). Having one sex partner (who does not have STIs and does not have sex with anyone else) is a good way to avoid these infections. · Use birth control if you do not want to have children at this time. Talk with your doctor about the choices available and what might be best for you. · Protect your skin from too much sun. When you're outdoors from 10 a.m. to 4 p.m., stay in the shade or cover up with clothing and a hat with a wide brim. Wear sunglasses that block UV rays. Even when it's cloudy, put broad-spectrum sunscreen (SPF 30 or higher) on any exposed skin.   · See a dentist one or two times a year for checkups and to have your teeth cleaned. · Wear a seat belt in the car. · Drink alcohol in moderation, if at all. That means no more than 2 drinks a day for men and 1 drink a day for women. Follow your doctor's advice about when to have certain tests. These tests can spot problems early. For everyone  · Cholesterol. Have the fat (cholesterol) in your blood tested after age 21. Your doctor will tell you how often to have this done based on your age, family history, or other things that can increase your risk for heart disease. · Blood pressure. Have your blood pressure checked during a routine doctor visit. Your doctor will tell you how often to check your blood pressure based on your age, your blood pressure results, and other factors. · Vision. Talk with your doctor about how often to have a glaucoma test.  · Diabetes. Ask your doctor whether you should have tests for diabetes. · Colon cancer. Have a test for colon cancer at age 48. You may have one of several tests. If you are younger than 48, you may need a test earlier if you have any risk factors. Risk factors include whether you already had a precancerous polyp removed from your colon or whether your parent, brother, sister, or child has had colon cancer. For women  · Breast exam and mammogram. Talk to your doctor about when you should have a clinical breast exam and a mammogram. Medical experts differ on whether and how often women under 50 should have these tests. Your doctor can help you decide what is right for you. · Pap test and pelvic exam. Begin Pap tests at age 24. A Pap test is the best way to find cervical cancer. The test often is part of a pelvic exam. Ask how often to have this test.  · Tests for sexually transmitted infections (STIs). Ask whether you should have tests for STIs. You may be at risk if you have sex with more than one person, especially if your partners do not wear condoms.   For men  · Tests for sexually transmitted infections (STIs). Ask whether you should have tests for STIs. You may be at risk if you have sex with more than one person, especially if you do not wear a condom. · Testicular cancer exam. Ask your doctor whether you should check your testicles regularly. · Prostate exam. Talk to your doctor about whether you should have a blood test (called a PSA test) for prostate cancer. Experts differ on whether and when men should have this test. Some experts suggest it if you are older than 39 and are -American or have a father or brother who got prostate cancer when he was younger than 72. When should you call for help? Watch closely for changes in your health, and be sure to contact your doctor if you have any problems or symptoms that concern you. Where can you learn more? Go to http://marissa-annabel.info/. Enter P072 in the search box to learn more about \"Well Visit, Ages 25 to 48: Care Instructions. \"  Current as of: July 19, 2016  Content Version: 11.3  © 7423-6362 [employer]. Care instructions adapted under license by [employer] (which disclaims liability or warranty for this information). If you have questions about a medical condition or this instruction, always ask your healthcare professional. [address] any warranty or liability for your use of this information. stated

## 2024-09-29 LAB
ANION GAP SERPL CALC-SCNC: 12 MMOL/L — SIGNIFICANT CHANGE UP (ref 5–17)
ANION GAP SERPL CALC-SCNC: 13 MMOL/L — SIGNIFICANT CHANGE UP (ref 5–17)
BASOPHILS # BLD AUTO: 0.03 K/UL — SIGNIFICANT CHANGE UP (ref 0–0.2)
BASOPHILS NFR BLD AUTO: 0.2 % — SIGNIFICANT CHANGE UP (ref 0–2)
BUN SERPL-MCNC: 13 MG/DL — SIGNIFICANT CHANGE UP (ref 7–23)
BUN SERPL-MCNC: 21 MG/DL — SIGNIFICANT CHANGE UP (ref 7–23)
CALCIUM SERPL-MCNC: 8.7 MG/DL — SIGNIFICANT CHANGE UP (ref 8.4–10.5)
CALCIUM SERPL-MCNC: 8.9 MG/DL — SIGNIFICANT CHANGE UP (ref 8.4–10.5)
CHLORIDE SERPL-SCNC: 100 MMOL/L — SIGNIFICANT CHANGE UP (ref 96–108)
CHLORIDE SERPL-SCNC: 104 MMOL/L — SIGNIFICANT CHANGE UP (ref 96–108)
CO2 SERPL-SCNC: 21 MMOL/L — LOW (ref 22–31)
CO2 SERPL-SCNC: 25 MMOL/L — SIGNIFICANT CHANGE UP (ref 22–31)
CREAT SERPL-MCNC: 0.55 MG/DL — SIGNIFICANT CHANGE UP (ref 0.5–1.3)
CREAT SERPL-MCNC: 0.79 MG/DL — SIGNIFICANT CHANGE UP (ref 0.5–1.3)
EGFR: 77 ML/MIN/1.73M2 — SIGNIFICANT CHANGE UP
EGFR: 95 ML/MIN/1.73M2 — SIGNIFICANT CHANGE UP
EOSINOPHIL # BLD AUTO: 0 K/UL — SIGNIFICANT CHANGE UP (ref 0–0.5)
EOSINOPHIL NFR BLD AUTO: 0 % — SIGNIFICANT CHANGE UP (ref 0–6)
GAS PNL BLDA: SIGNIFICANT CHANGE UP
GLUCOSE SERPL-MCNC: 129 MG/DL — HIGH (ref 70–99)
GLUCOSE SERPL-MCNC: 148 MG/DL — HIGH (ref 70–99)
HCT VFR BLD CALC: 35.6 % — SIGNIFICANT CHANGE UP (ref 34.5–45)
HGB BLD-MCNC: 12.1 G/DL — SIGNIFICANT CHANGE UP (ref 11.5–15.5)
IMM GRANULOCYTES NFR BLD AUTO: 1 % — HIGH (ref 0–0.9)
LYMPHOCYTES # BLD AUTO: 0.92 K/UL — LOW (ref 1–3.3)
LYMPHOCYTES # BLD AUTO: 6.9 % — LOW (ref 13–44)
MCHC RBC-ENTMCNC: 29.3 PG — SIGNIFICANT CHANGE UP (ref 27–34)
MCHC RBC-ENTMCNC: 34 GM/DL — SIGNIFICANT CHANGE UP (ref 32–36)
MCV RBC AUTO: 86.2 FL — SIGNIFICANT CHANGE UP (ref 80–100)
MONOCYTES # BLD AUTO: 0.48 K/UL — SIGNIFICANT CHANGE UP (ref 0–0.9)
MONOCYTES NFR BLD AUTO: 3.6 % — SIGNIFICANT CHANGE UP (ref 2–14)
NEUTROPHILS # BLD AUTO: 11.76 K/UL — HIGH (ref 1.8–7.4)
NEUTROPHILS NFR BLD AUTO: 88.3 % — HIGH (ref 43–77)
NRBC # BLD: 0 /100 WBCS — SIGNIFICANT CHANGE UP (ref 0–0)
PLATELET # BLD AUTO: 199 K/UL — SIGNIFICANT CHANGE UP (ref 150–400)
POTASSIUM SERPL-MCNC: 3.1 MMOL/L — LOW (ref 3.5–5.3)
POTASSIUM SERPL-MCNC: 3.5 MMOL/L — SIGNIFICANT CHANGE UP (ref 3.5–5.3)
POTASSIUM SERPL-SCNC: 3.1 MMOL/L — LOW (ref 3.5–5.3)
POTASSIUM SERPL-SCNC: 3.5 MMOL/L — SIGNIFICANT CHANGE UP (ref 3.5–5.3)
RBC # BLD: 4.13 M/UL — SIGNIFICANT CHANGE UP (ref 3.8–5.2)
RBC # FLD: 13.9 % — SIGNIFICANT CHANGE UP (ref 10.3–14.5)
SODIUM SERPL-SCNC: 134 MMOL/L — LOW (ref 135–145)
SODIUM SERPL-SCNC: 141 MMOL/L — SIGNIFICANT CHANGE UP (ref 135–145)
WBC # BLD: 13.32 K/UL — HIGH (ref 3.8–10.5)
WBC # FLD AUTO: 13.32 K/UL — HIGH (ref 3.8–10.5)

## 2024-09-29 PROCEDURE — 0055T BONE SRGRY CMPTR CT/MRI IMAG: CPT

## 2024-09-29 PROCEDURE — 99291 CRITICAL CARE FIRST HOUR: CPT

## 2024-09-29 PROCEDURE — 22614 ARTHRD PST TQ 1NTRSPC EA ADD: CPT

## 2024-09-29 PROCEDURE — 22854 INSJ BIOMECHANICAL DEVICE: CPT

## 2024-09-29 PROCEDURE — 99292 CRITICAL CARE ADDL 30 MIN: CPT | Mod: FS

## 2024-09-29 PROCEDURE — 63101 REMOVE VERT BODY DCMPRN THRC: CPT

## 2024-09-29 PROCEDURE — 72020 X-RAY EXAM OF SPINE 1 VIEW: CPT | Mod: 26

## 2024-09-29 PROCEDURE — 22610 ARTHRD PST TQ 1NTRSPC THRC: CPT

## 2024-09-29 PROCEDURE — 22532 ARTHRD LAT XTRCVTRY TQ THRC: CPT

## 2024-09-29 PROCEDURE — 22843 INSERT SPINE FIXATION DEVICE: CPT

## 2024-09-29 PROCEDURE — 71045 X-RAY EXAM CHEST 1 VIEW: CPT | Mod: 26

## 2024-09-29 DEVICE — SURGIFOAM PAD 8CM X 12.5CM X 10MM (100): Type: IMPLANTABLE DEVICE | Status: FUNCTIONAL

## 2024-09-29 DEVICE — IMPLANTABLE DEVICE: Type: IMPLANTABLE DEVICE | Status: FUNCTIONAL

## 2024-09-29 DEVICE — SET SCREW NXG: Type: IMPLANTABLE DEVICE | Status: FUNCTIONAL

## 2024-09-29 DEVICE — KIT A-LINE 1LUM 20G X 12CM SAFE KIT: Type: IMPLANTABLE DEVICE | Status: FUNCTIONAL

## 2024-09-29 DEVICE — SCREW ST SML VBR: Type: IMPLANTABLE DEVICE | Status: FUNCTIONAL

## 2024-09-29 DEVICE — HEAD POLY BODY TOP LOAD: Type: IMPLANTABLE DEVICE | Status: FUNCTIONAL

## 2024-09-29 DEVICE — DVC ADHERUS AUTOSPRAY W/ DURAL SEALANT EXT TIP: Type: IMPLANTABLE DEVICE | Status: FUNCTIONAL

## 2024-09-29 DEVICE — SCREW JANUS HA FEN 6.5X50MM: Type: IMPLANTABLE DEVICE | Status: FUNCTIONAL

## 2024-09-29 DEVICE — SURGIFLO MATRIX WITH THROMBIN KIT: Type: IMPLANTABLE DEVICE | Status: FUNCTIONAL

## 2024-09-29 DEVICE — GRAFT BONE INFUSE KIT SM: Type: IMPLANTABLE DEVICE | Status: FUNCTIONAL

## 2024-09-29 DEVICE — PIN FIX PERQ 100MM: Type: IMPLANTABLE DEVICE | Status: FUNCTIONAL

## 2024-09-29 DEVICE — TACHOSIL 9.5 X 4.8CM: Type: IMPLANTABLE DEVICE | Status: FUNCTIONAL

## 2024-09-29 RX ORDER — BISACODYL 5 MG/1
5 TABLET, COATED ORAL EVERY 12 HOURS
Refills: 0 | Status: DISCONTINUED | OUTPATIENT
Start: 2024-09-29 | End: 2024-10-05

## 2024-09-29 RX ORDER — HYDROMORPHONE HYDROCHLORIDE 1 MG/ML
0.5 INJECTION, SOLUTION INTRAMUSCULAR; INTRAVENOUS; SUBCUTANEOUS
Refills: 0 | Status: DISCONTINUED | OUTPATIENT
Start: 2024-09-29 | End: 2024-09-30

## 2024-09-29 RX ORDER — ONDANSETRON HCL/PF 4 MG/2 ML
4 VIAL (ML) INJECTION EVERY 6 HOURS
Refills: 0 | Status: DISCONTINUED | OUTPATIENT
Start: 2024-09-29 | End: 2024-10-10

## 2024-09-29 RX ORDER — HYDRALAZINE HYDROCHLORIDE 100 MG/1
10 TABLET ORAL ONCE
Refills: 0 | Status: COMPLETED | OUTPATIENT
Start: 2024-09-29 | End: 2024-09-29

## 2024-09-29 RX ORDER — MAGNESIUM HYDROXIDE 400 MG/5ML
30 SUSPENSION, ORAL (FINAL DOSE FORM) ORAL EVERY 12 HOURS
Refills: 0 | Status: DISCONTINUED | OUTPATIENT
Start: 2024-09-29 | End: 2024-10-10

## 2024-09-29 RX ORDER — PREGABALIN 25 MG/1
50 CAPSULE ORAL THREE TIMES A DAY
Refills: 0 | Status: DISCONTINUED | OUTPATIENT
Start: 2024-09-29 | End: 2024-10-06

## 2024-09-29 RX ORDER — SODIUM CHLORIDE 0.9 % (FLUSH) 0.9 %
1000 SYRINGE (ML) INJECTION
Refills: 0 | Status: DISCONTINUED | OUTPATIENT
Start: 2024-09-29 | End: 2024-10-01

## 2024-09-29 RX ORDER — HYDROMORPHONE HYDROCHLORIDE 1 MG/ML
0.5 INJECTION, SOLUTION INTRAMUSCULAR; INTRAVENOUS; SUBCUTANEOUS ONCE
Refills: 0 | Status: DISCONTINUED | OUTPATIENT
Start: 2024-09-29 | End: 2024-09-29

## 2024-09-29 RX ORDER — OXYCODONE HYDROCHLORIDE 30 MG/1
10 TABLET, FILM COATED, EXTENDED RELEASE ORAL EVERY 4 HOURS
Refills: 0 | Status: DISCONTINUED | OUTPATIENT
Start: 2024-09-29 | End: 2024-09-30

## 2024-09-29 RX ORDER — SENNOSIDES 8.6 MG
2 TABLET ORAL AT BEDTIME
Refills: 0 | Status: DISCONTINUED | OUTPATIENT
Start: 2024-09-29 | End: 2024-10-07

## 2024-09-29 RX ORDER — SODIUM CHLORIDE IRRIG SOLUTION 0.9 %
1000 SOLUTION, IRRIGATION IRRIGATION
Refills: 0 | Status: DISCONTINUED | OUTPATIENT
Start: 2024-09-29 | End: 2024-09-29

## 2024-09-29 RX ORDER — MULTI VITAMIN/MINERAL SUPPLEMENT WITH ASCORBIC ACID, NIACIN, PYRIDOXINE, PANTOTHENIC ACID, FOLIC ACID, RIBOFLAVIN, THIAMIN, BIOTIN, COBALAMIN AND ZINC. 60; 20; 12.5; 10; 10; 1.7; 1.5; 1; .3; .006 MG/1; MG/1; MG/1; MG/1; MG/1; MG/1; MG/1; MG/1; MG/1; MG/1
1 TABLET, COATED ORAL DAILY
Refills: 0 | Status: DISCONTINUED | OUTPATIENT
Start: 2024-09-29 | End: 2024-10-17

## 2024-09-29 RX ORDER — SODIUM CHLORIDE 0.9 % (FLUSH) 0.9 %
1000 SYRINGE (ML) INJECTION ONCE
Refills: 0 | Status: COMPLETED | OUTPATIENT
Start: 2024-09-29 | End: 2024-09-28

## 2024-09-29 RX ORDER — OXYCODONE HYDROCHLORIDE 30 MG/1
5 TABLET, FILM COATED, EXTENDED RELEASE ORAL EVERY 4 HOURS
Refills: 0 | Status: DISCONTINUED | OUTPATIENT
Start: 2024-09-29 | End: 2024-09-30

## 2024-09-29 RX ORDER — NALOXONE HYDROCHLORIDE 0.4 MG/ML
0.1 INJECTION, SOLUTION INTRAMUSCULAR; INTRAVENOUS; SUBCUTANEOUS
Refills: 0 | Status: DISCONTINUED | OUTPATIENT
Start: 2024-09-29 | End: 2024-09-30

## 2024-09-29 RX ORDER — ONDANSETRON HCL/PF 4 MG/2 ML
4 VIAL (ML) INJECTION EVERY 6 HOURS
Refills: 0 | Status: DISCONTINUED | OUTPATIENT
Start: 2024-09-29 | End: 2024-09-30

## 2024-09-29 RX ORDER — VANCOMYCIN HCL-SODIUM CHLORIDE IV SOLN 1.5 GM/250ML-0.9% 1.5-0.9/25 GM/ML-%
1250 SOLUTION INTRAVENOUS ONCE
Refills: 0 | Status: COMPLETED | OUTPATIENT
Start: 2024-09-29 | End: 2024-09-29

## 2024-09-29 RX ORDER — ACETAMINOPHEN 325 MG
1000 TABLET ORAL ONCE
Refills: 0 | Status: COMPLETED | OUTPATIENT
Start: 2024-09-29 | End: 2024-09-29

## 2024-09-29 RX ORDER — ACETAMINOPHEN 325 MG
1000 TABLET ORAL EVERY 8 HOURS
Refills: 0 | Status: DISCONTINUED | OUTPATIENT
Start: 2024-09-29 | End: 2024-10-01

## 2024-09-29 RX ORDER — HYDROMORPHONE HYDROCHLORIDE 1 MG/ML
30 INJECTION, SOLUTION INTRAMUSCULAR; INTRAVENOUS; SUBCUTANEOUS
Refills: 0 | Status: DISCONTINUED | OUTPATIENT
Start: 2024-09-29 | End: 2024-09-30

## 2024-09-29 RX ADMIN — PRAMIPEXOLE DIHYDROCHLORIDE 1.5 MILLIGRAM(S): 0.5 TABLET ORAL at 00:25

## 2024-09-29 RX ADMIN — Medication 70 MILLILITER(S): at 07:14

## 2024-09-29 RX ADMIN — Medication 400 MILLIGRAM(S): at 23:04

## 2024-09-29 RX ADMIN — HYDROMORPHONE HYDROCHLORIDE 0.5 MILLIGRAM(S): 1 INJECTION, SOLUTION INTRAMUSCULAR; INTRAVENOUS; SUBCUTANEOUS at 19:48

## 2024-09-29 RX ADMIN — HYDROMORPHONE HYDROCHLORIDE 0.5 MILLIGRAM(S): 1 INJECTION, SOLUTION INTRAMUSCULAR; INTRAVENOUS; SUBCUTANEOUS at 19:33

## 2024-09-29 RX ADMIN — HYDRALAZINE HYDROCHLORIDE 10 MILLIGRAM(S): 100 TABLET ORAL at 09:00

## 2024-09-29 RX ADMIN — Medication 500 MILLIGRAM(S): at 08:59

## 2024-09-29 RX ADMIN — HYDROMORPHONE HYDROCHLORIDE 0.5 MILLIGRAM(S): 1 INJECTION, SOLUTION INTRAMUSCULAR; INTRAVENOUS; SUBCUTANEOUS at 04:20

## 2024-09-29 RX ADMIN — Medication 40 MILLIEQUIVALENT(S): at 03:12

## 2024-09-29 RX ADMIN — HYDROMORPHONE HYDROCHLORIDE 30 MILLILITER(S): 1 INJECTION, SOLUTION INTRAMUSCULAR; INTRAVENOUS; SUBCUTANEOUS at 20:05

## 2024-09-29 RX ADMIN — Medication 4 MILLIGRAM(S): at 05:51

## 2024-09-29 RX ADMIN — HYDROMORPHONE HYDROCHLORIDE 0.5 MILLIGRAM(S): 1 INJECTION, SOLUTION INTRAMUSCULAR; INTRAVENOUS; SUBCUTANEOUS at 21:10

## 2024-09-29 RX ADMIN — Medication 40 MILLIGRAM(S): at 05:10

## 2024-09-29 RX ADMIN — HYDROMORPHONE HYDROCHLORIDE 30 MILLILITER(S): 1 INJECTION, SOLUTION INTRAMUSCULAR; INTRAVENOUS; SUBCUTANEOUS at 21:07

## 2024-09-29 RX ADMIN — Medication 17 GRAM(S): at 05:10

## 2024-09-29 RX ADMIN — VANCOMYCIN HCL-SODIUM CHLORIDE IV SOLN 1.5 GM/250ML-0.9% 166.67 MILLIGRAM(S): 1.5-0.9/25 SOLUTION at 21:48

## 2024-09-29 RX ADMIN — Medication 8.3 MICROGRAM(S)/KG/MIN: at 07:14

## 2024-09-29 RX ADMIN — Medication 1000 MILLIGRAM(S): at 23:19

## 2024-09-29 RX ADMIN — HYDROMORPHONE HYDROCHLORIDE 30 MILLILITER(S): 1 INJECTION, SOLUTION INTRAMUSCULAR; INTRAVENOUS; SUBCUTANEOUS at 07:12

## 2024-09-29 RX ADMIN — Medication 1 GRAM(S): at 05:10

## 2024-09-29 NOTE — PROGRESS NOTE ADULT - SUBJECTIVE AND OBJECTIVE BOX
HOSPITAL COURSE:  74F on celecoxib for OA last took y’day otherwise no AC/AP h/o HTN, gastroparesis, peripheral neuropathy, multiple prior thoracolumbar spine surgeries for congenital scoliosis [place] >[place] of state w/ removal of hardware 1991 presents with back pain s/p mechanical fall from sitting y'day. Describes severe mid back pain and left hip pain, no numbness, weakness, saddle anesthesia, b/b symptoms. Pain worsened with movement, axial loading. CT TL spine w/ acute nondisplaced fracture of T12 veterbral body extending into right pedicle w/o clear cord impingement.    12h events: naeon    PHYSICAL EXAM:    Constitutional: No Acute Distress     Neurological: Awake, alert oriented to person, place and time, Following Commands, PERRL, EOMI, No Gaze Preference, Face Symmetrical, Speech Fluent, No dysmetria, No ataxia, No nystagmus RIVERS strong antigravity, pain limited                                                  Sensation: [ ] intact to light touch  [x ] decreased: bilateral lowers stocking glove distribution     Pulmonary: Clear to Auscultation, No rales, No rhonchi, No wheezes     Cardiovascular: S1, S2, Regular rate and rhythm     Gastrointestinal: Soft, Non-tender, Non-distended     VITALS:   Vital Signs Last 24 Hrs  T(C): 36.8 (29 Sep 2024 07:00), Max: 37.4 (28 Sep 2024 18:07)  T(F): 98.2 (29 Sep 2024 07:00), Max: 99.4 (28 Sep 2024 18:07)  HR: 63 (29 Sep 2024 07:15) (53 - 75)  BP: 194/85 (29 Sep 2024 07:15) (96/49 - 206/93)  BP(mean): 128 (29 Sep 2024 07:15) (70 - 133)  RR: 23 (29 Sep 2024 07:15) (15 - 31)  SpO2: 98% (29 Sep 2024 07:15) (92% - 100%)    Parameters below as of 29 Sep 2024 07:00  Patient On (Oxygen Delivery Method): nasal cannula  O2 Flow (L/min): 2      Drips     Respiratory:        LABS:                        10.7   9.28  )-----------( 250      ( 28 Sep 2024 22:41 )             32.4     09-29    141  |  104  |  21  ----------------------------<  129[H]  3.1[L]   |  25  |  0.79      CAPILLARY BLOOD GLUCOSE          I&O's Summary    28 Sep 2024 07:01  -  29 Sep 2024 07:00  --------------------------------------------------------  IN: 2365.8 mL / OUT: 2500 mL / NET: -134.2 mL        Imaging   CXR     EKG     MEDICATION LEVELS:     IVF FLUIDS/MEDICATIONS:   MEDICATIONS  (STANDING):  atorvastatin 40 milliGRAM(s) Oral at bedtime  bisacodyl 5 milliGRAM(s) Oral at bedtime  chlorhexidine 4% Liquid 1 Application(s) Topical <User Schedule>  DULoxetine 20 milliGRAM(s) Oral daily  famotidine    Tablet 40 milliGRAM(s) Oral two times a day  HYDROmorphone PCA (1 mG/mL) 30 milliLiter(s) PCA Continuous PCA Continuous  montelukast 10 milliGRAM(s) Oral at bedtime  norepinephrine Infusion 0.05 MICROgram(s)/kG/Min (8.3 mL/Hr) IV Continuous <Continuous>  polyethylene glycol 3350 17 Gram(s) Oral two times a day  potassium chloride    Tablet ER 40 milliEquivalent(s) Oral every 4 hours  senna 1 Tablet(s) Oral daily  sodium chloride 0.9%. 1000 milliLiter(s) (70 mL/Hr) IV Continuous <Continuous>  sucralfate suspension 1 Gram(s) Oral every 6 hours    MEDICATIONS  (PRN):  acetaminophen     Tablet .. 325 milliGRAM(s) Oral every 4 hours PRN Temp greater or equal to 38C (100.4F), Mild Pain (1 - 3), Moderate Pain (4 - 6)  HYDROmorphone PCA (1 mG/mL) Rescue Clinician Bolus 0.5 milliGRAM(s) IV Push every 15 minutes PRN for Pain Scale GREATER THAN 6  methocarbamol 500 milliGRAM(s) Oral every 8 hours PRN Muscle Spasm  naloxone Injectable 0.1 milliGRAM(s) IV Push every 3 minutes PRN For ANY of the following changes in patient status:  A. RR LESS THAN 10 breaths per minute, B. Oxygen saturation LESS THAN 90%, C. Sedation score of 6  ondansetron   Disintegrating Tablet 4 milliGRAM(s) Oral every 6 hours PRN Nausea  ondansetron Injectable 4 milliGRAM(s) IV Push every 6 hours PRN Nausea           HPI  74F on celecoxib for OA last took y’day otherwise no AC/AP h/o HTN, gastroparesis, peripheral neuropathy, multiple prior thoracolumbar spine surgeries for congenital scoliosis done >10 years ago out of state w/ removal of hardware 1991 presents with back pain s/p mechanical fall from sitting y'day. Describes severe mid back pain and left hip pain, no numbness, weakness, saddle anesthesia, b/b symptoms. Pain worsened with movement, axial loading. CT TL spine w/ acute nondisplaced fracture of T12 vertebral body extending into right pedicle w/o clear cord impingement.    12h events: started levo for MAP goal >85, now off levo, nausea     PHYSICAL EXAM:  Constitutional: No Acute Distress   Neurological: Awake, alert oriented to person, place and time, Following Commands, PERRL, EOMI, No Gaze Preference, Face Symmetrical, Speech Fluent, No dysmetria, No ataxia, No nystagmus RIVERS strong antigravity, pain limited                                     Sensation: [ ] intact to light touch  [x ] decreased: bilateral lowers stocking glove distribution   Cardiovascular: S1, S2, Regular rate and rhythm   Pulmonary: Clear to Auscultation, No rales, No rhonchi, No wheezes   Gastrointestinal: Soft, Non-tender, Non-distended     VITALS:   Vital Signs Last 24 Hrs  T(C): 36.8 (29 Sep 2024 07:00), Max: 37.4 (28 Sep 2024 18:07)  T(F): 98.2 (29 Sep 2024 07:00), Max: 99.4 (28 Sep 2024 18:07)  HR: 63 (29 Sep 2024 07:15) (53 - 75)  BP: 194/85 (29 Sep 2024 07:15) (96/49 - 206/93)  BP(mean): 128 (29 Sep 2024 07:15) (70 - 133)  RR: 23 (29 Sep 2024 07:15) (15 - 31)  SpO2: 98% (29 Sep 2024 07:15) (92% - 100%)    Parameters below as of 29 Sep 2024 07:00  Patient On (Oxygen Delivery Method): nasal cannula  O2 Flow (L/min): 2      Drips   none      Respiratory:        LABS:                        10.7   9.28  )-----------( 250      ( 28 Sep 2024 22:41 )             32.4     09-29    141  |  104  |  21  ----------------------------<  129[H]  3.1[L]   |  25  |  0.79      CAPILLARY BLOOD GLUCOSE          I&O's Summary    28 Sep 2024 07:01  -  29 Sep 2024 07:00  --------------------------------------------------------  IN: 2365.8 mL / OUT: 2500 mL / NET: -134.2 mL        Imaging   CXR     EKG     MEDICATION LEVELS:     IVF FLUIDS/MEDICATIONS:   MEDICATIONS  (STANDING):  atorvastatin 40 milliGRAM(s) Oral at bedtime  bisacodyl 5 milliGRAM(s) Oral at bedtime  chlorhexidine 4% Liquid 1 Application(s) Topical <User Schedule>  DULoxetine 20 milliGRAM(s) Oral daily  famotidine    Tablet 40 milliGRAM(s) Oral two times a day  HYDROmorphone PCA (1 mG/mL) 30 milliLiter(s) PCA Continuous PCA Continuous  montelukast 10 milliGRAM(s) Oral at bedtime  norepinephrine Infusion 0.05 MICROgram(s)/kG/Min (8.3 mL/Hr) IV Continuous <Continuous>  polyethylene glycol 3350 17 Gram(s) Oral two times a day  potassium chloride    Tablet ER 40 milliEquivalent(s) Oral every 4 hours  senna 1 Tablet(s) Oral daily  sodium chloride 0.9%. 1000 milliLiter(s) (70 mL/Hr) IV Continuous <Continuous>  sucralfate suspension 1 Gram(s) Oral every 6 hours    MEDICATIONS  (PRN):  acetaminophen     Tablet .. 325 milliGRAM(s) Oral every 4 hours PRN Temp greater or equal to 38C (100.4F), Mild Pain (1 - 3), Moderate Pain (4 - 6)  HYDROmorphone PCA (1 mG/mL) Rescue Clinician Bolus 0.5 milliGRAM(s) IV Push every 15 minutes PRN for Pain Scale GREATER THAN 6  methocarbamol 500 milliGRAM(s) Oral every 8 hours PRN Muscle Spasm  naloxone Injectable 0.1 milliGRAM(s) IV Push every 3 minutes PRN For ANY of the following changes in patient status:  A. RR LESS THAN 10 breaths per minute, B. Oxygen saturation LESS THAN 90%, C. Sedation score of 6  ondansetron   Disintegrating Tablet 4 milliGRAM(s) Oral every 6 hours PRN Nausea  ondansetron Injectable 4 milliGRAM(s) IV Push every 6 hours PRN Nausea

## 2024-09-29 NOTE — OPERATIVE REPORT - OPERATIVE RPOSRT DETAILS
Operative Report      Surgeon: Dustin Sanchez MD  Assistants: Jackson Gregorio MD    Preoperative diagnosis: open wound of spine, dural tear  Postoperative diagnosis: same    Procedure(s):   1.	Bilateral paraspinous muscle flap closure of thoracic and lumbar spine with overlying complex closure, 28 cm     Finding(s): see description below.   Specimen(s): none  Estimated blood loss: 20 cc  Drains: 2    Indication:     This 74 year old patient with history of scoliosis and multiple (10+) spine surgeries is undergoing back surgery and we are consulted emergently in to the OR to help with muscles flaps and closure given the patient suffered a dural tear during the spine portion of the surgery which necessitates durable muscle flap coverage over this repair. For these reasons preoperative consent was not obtained as an emergent intraop consult was placed by spine surgery. Due to the patients risk of persistent dural leak, infection and wound dehiscence; it was thought to be crucial to establish a durable muscle flap layer for both protection of the hardware and minimize other complications mentioned previously.     Referring provider:     Description:   The patient was in the operating room on the table in the prone position, prepped and draped in the sterile fashion. The paraspinous muscles were removed from the spinous processes.     I turned my attention to the right paraspinous muscles. The anterior investing fascia was dissected to the most lateral border. The blood vessels (both artery and vein) were dissected free from surrounding tissues and were completely mobilized, elevated, and isolated to dissect the paraspinous muscle on its primary axial blood supply. This required release of the muscles from the transverse process. After the muscle was completely mobilized and elevated, it was transferred from its donor site to the recipient midline. The donor site was closed.  The wound was irrigated and hemostasis was achieved meticulously.      I turned my attention to the left paraspinous muscles. The anterior investing fascia was dissected to the most lateral border. The blood vessels (both artery and vein) were dissected free from surrounding tissues and were completely mobilized, elevated, and isolated to dissect the paraspinous muscle on its primary axial blood supply. This required release of the muscles from the transverse process. After the muscle was completely mobilized and elevated, it was transferred from its donor site to the recipient midline. The donor site was closed.  The wound was irrigated and hemostasis was achieved meticulously.      The muscles both were inset and sutured into the midline defect over a right sided drain using 0 PDS suture in running locking fashion. 2-0 silk was used for the drain. The wound was again irrigated and hemostasis achieved meticulously. A subcutaneous drain was left in place on left side and sutured down to the skin externally with a 2-0 silk suture. The wound was then repaired in layers, first using 2-0 PDS for the deep fascial plane, then 3-0 monocryl for the dermal plane, and then 3-0 monocryl running suture was placed, completing the closure of 28cm of the back.      Bacitracin and a mepilex ag dressing was placed over the incision. The patient tolerated the procedure. Counts were correct. The patient was then turned over to anesthesia and all draped removed.  The patient was transferred to the adjacent hospital bed for a supine extubation.     NOTE: The right drain is adjacent to dural repair and is not to be placed to suction, left drain is subcutaneous and can be placed on suction    Complication(s): none  Disposition: stable

## 2024-09-29 NOTE — PROGRESS NOTE ADULT - ASSESSMENT
Preop for OR 9/29 for fixation   strict spine precautions  bedrest   cannot lie flat HOB 30   MR T/L spine w/o con to r/o epidural hematoma- unable to tolerate flat   LED-p

## 2024-09-29 NOTE — PROGRESS NOTE ADULT - ASSESSMENT
ASSESSMENT: HPI:  74F on celecoxib for OA last took y’day otherwise no AC/AP h/o HTN, gastroparesis, peripheral neuropathy, multiple prior thoracolumbar spine surgeries for congenital scoliosis done >[place] ago [place] w/ removal of hardware 1991 presents with back pain s/p mechanical fall from sitting y'day. Describes severe mid back pain and left hip pain, no numbness, weakness, saddle anesthesia, b/b symptoms. Pain worsened with movement, axial loading. CT TL spine w/ acute nondisplaced fracture of T12 veterbral body extending into right pedicle w/o clear cord impingement. (28 Sep 2024 15:45)      NEURO:  #T12 fracture  MAP goals >85   CT thoracix/lumbar done  MR aborted as patient did not tolerate flat 2/2 to pain  Pain control with tylenol, oxy and Dilaudid prn   pending PCA pump for pain   strict bed rest   spine precautions, log role permitted   continue home cymbalta, robaxin and pramipexole   Activity: [] OOB as tolerated [x] Bedrest [] PT [] OT [] PMNR      CV:  maintain MAP> 85 for spinal cord perfusion   started on levo   honme losartan held for MAP goals   continue home lipitor     PULM:  Incentive spirometry  on room air   sat >92%       RENAL:  IVF while NPO   NS @ 70cc   monitor i/os     GI:  h/o GERD   Diet: NPO preop for OR in AM   GI prophylaxis [] not indicated [] PPI [x] other: pepcid, home medication   Bowel regimen [] colace [x] senna [x] other: senna, miralax, dulcolax   continue home sucralfate     ENDO:   Goal euglycemia (-180)    HEME/ONC:  VTE prophylaxis: [x] SCDs [] chemoprophylaxis [x] hold chemoprophylaxis due to: preop in AM [] high risk of DVT/PE on admission due to:  LED -p     ID:  afebrile   monitor fever curve       CODE STATUS:  [x] Full Code [] DNR [] DNI [] Palliative/Comfort Care    DISPOSITION:  [x] ICU [] Stroke Unit [] Floor [] EMU [] RCU [] PCU   ASSESSMENT: HPI:  74F on celecoxib for OA last took y’day otherwise no AC/AP h/o HTN, gastroparesis, peripheral neuropathy, multiple prior thoracolumbar spine surgeries for congenital scoliosis done >10 years ago out of state w/ removal of hardware 1991 presents with back pain s/p mechanical fall from sitting y'day. Describes severe mid back pain and left hip pain, no numbness, weakness, saddle anesthesia, b/b symptoms. Pain worsened with movement, axial loading. CT TL spine w/ acute nondisplaced fracture of T12 vertebral body extending into right pedicle w/o clear cord impingement. (28 Sep 2024 15:45)      NEURO:  #T12 fracture  MAP goals >85   CT thoracic/lumbar done  MR aborted as patient did not tolerate flat 2/2 to pain  #Pain control with PCA pump  strict bed rest   spine precautions, log role permitted   continue home cymbalta, robaxin and pramipexole     CV:  maintain MAP> 85 for spinal cord perfusion   off levo   honme losartan held for MAP goals   continue home lipitor     PULM:  Incentive spirometry  on room air   sat >92%       RENAL:  IVF while NPO   NS @ 70cc   monitor i/os     GI:  h/o GERD   Diet: NPO preop for OR in AM   GI prophylaxis [] not indicated [] PPI [x] other: pepcid, home medication   Bowel regimen [] colace [x] senna [x] other: senna, miralax, dulcolax   zofran   continue home sucralfate     ENDO:   Goal euglycemia (-180)    HEME/ONC:  VTE prophylaxis: [x] SCDs [] chemoprophylaxis [x] hold chemoprophylaxis due to: preop in AM [] high risk of DVT/PE on admission due to:  LED -p     ID:  afebrile   monitor fever curve     CODE STATUS:  [x] Full Code [] DNR [] DNI [] Palliative/Comfort Care    DISPOSITION:  [x] ICU [] Stroke Unit [] Floor [] EMU [] RCU [] PCU

## 2024-09-29 NOTE — PROGRESS NOTE ADULT - SUBJECTIVE AND OBJECTIVE BOX
HOSPITAL COURSE:  74F on celecoxib for OA last took y’day otherwise no AC/AP h/o HTN, gastroparesis, peripheral neuropathy, multiple prior thoracolumbar spine surgeries for congenital scoliosis done >10 years ago out of state w/ removal of hardware 1991 presents with back pain s/p mechanical fall from sitting y'day. Describes severe mid back pain and left hip pain, no numbness, weakness, saddle anesthesia, b/b symptoms. Pain worsened with movement, axial loading. CT TL spine w/ acute nondisplaced fracture of T12 vertebral body extending into right pedicle w/o clear cord impingement.    24 hour Events:     9/29 s/p T9-L3 fusion. Pt had torn dura from injury. Placed on PCA pump.    Allergies    penicillins (Urticaria)  Dilantin (Urticaria)    Intolerances    erythromycin (Stomach Upset; Vomiting; Nausea)      REVIEW OF SYSTEMS: [ ] Unable to Assess due to neurologic exam   [X ] All ROS addressed below are non-contributory, except:  Neuro: [ ] Headache [X ] Back pain [ ] Numbness [ ] Weakness [ ] Ataxia [ ] Dizziness [ ] Aphasia [ ] Dysarthria [ ] Visual disturbance  Resp: [ ] Shortness of breath/dyspnea, [ ] Orthopnea [ ] Cough  CV: [ ] Chest pain [ ] Palpitation [ ] Lightheadedness [ ] Syncope  Renal: [ ] Thirst [ ] Edema  GI: [ ] Nausea [ ] Emesis [ ] Abdominal pain [ ] Constipation [ ] Diarrhea  Hem: [ ] Hematemesis [ ] bright red blood per rectum  ID: [ ] Fever [ ] Chills [ ] Dysuria  ENT: [ ] Rhinorrhea      DEVICES:   [ ] Restraints [ ] ET tube [ ] central line [X ] arterial line [ ] dumont [ ] NGT/OGT [ ] EVD [ ] LD [X ] RADHA/HMV [ ] Trach [ ] PEG [ ] Chest Tube     VITALS:   Vital Signs Last 24 Hrs  T(C): 36.6 (29 Sep 2024 23:00), Max: 36.9 (29 Sep 2024 05:00)  T(F): 97.8 (29 Sep 2024 23:00), Max: 98.4 (29 Sep 2024 05:00)  HR: 76 (30 Sep 2024 02:00) (53 - 92)  BP: 169/79 (29 Sep 2024 09:45) (121/58 - 210/87)  BP(mean): 114 (29 Sep 2024 09:45) (84 - 133)  RR: 18 (30 Sep 2024 02:00) (15 - 31)  SpO2: 93% (30 Sep 2024 02:00) (93% - 100%)    Parameters below as of 29 Sep 2024 19:00  Patient On (Oxygen Delivery Method): nasal cannula  O2 Flow (L/min): 3    I&O's Summary    28 Sep 2024 07:01  -  29 Sep 2024 07:00  --------------------------------------------------------  IN: 2365.8 mL / OUT: 2500 mL / NET: -134.2 mL    29 Sep 2024 07:01  -  30 Sep 2024 03:03  --------------------------------------------------------  IN: 2363.3 mL / OUT: 750 mL / NET: 1613.3 mL      LABS:                        12.1   13.32 )-----------( 199      ( 29 Sep 2024 19:31 )             35.6     09-29    134[L]  |  100  |  13  ----------------------------<  148[H]  3.5   |  21[L]  |  0.55             MEDICATION LEVELS:     IVF FLUIDS/MEDICATIONS:   MEDICATIONS  (STANDING):  acetaminophen     Tablet .. 1000 milliGRAM(s) Oral every 8 hours  HYDROmorphone PCA (1 mG/mL) 30 milliLiter(s) PCA Continuous PCA Continuous  methocarbamol 500 milliGRAM(s) Oral every 8 hours  multivitamin 1 Tablet(s) Oral daily  phenylephrine    Infusion 0.1 MICROgram(s)/kG/Min (3.32 mL/Hr) IV Continuous <Continuous>  polyethylene glycol 3350 17 Gram(s) Oral every 24 hours  pregabalin 50 milliGRAM(s) Oral three times a day  senna 2 Tablet(s) Oral at bedtime  sodium chloride 0.9%. 1000 milliLiter(s) (70 mL/Hr) IV Continuous <Continuous>    MEDICATIONS  (PRN):  bisacodyl 5 milliGRAM(s) Oral every 12 hours PRN Constipation  HYDROmorphone PCA (1 mG/mL) Rescue Clinician Bolus 0.5 milliGRAM(s) IV Push every 15 minutes PRN for Pain Scale GREATER THAN 6  magnesium hydroxide Suspension 30 milliLiter(s) Oral every 12 hours PRN Constipation  naloxone Injectable 0.1 milliGRAM(s) IV Push every 3 minutes PRN For ANY of the following changes in patient status:  A. RR LESS THAN 10 breaths per minute, B. Oxygen saturation LESS THAN 90%, C. Sedation score of 6  ondansetron   Disintegrating Tablet 4 milliGRAM(s) Oral every 6 hours PRN Nausea and/or Vomiting  ondansetron Injectable 4 milliGRAM(s) IV Push every 6 hours PRN Nausea  oxyCODONE    IR 10 milliGRAM(s) Oral every 4 hours PRN Severe Pain (7 - 10)  oxyCODONE    IR 5 milliGRAM(s) Oral every 4 hours PRN Moderate Pain (4 - 6)        IMAGING:  < from: CT Lumbar Spine Reform No Cont (09.28.24 @ 05:48) >  IMPRESSION:    Evaluation of the thoracic, abdominal and pelvic organs and vasculature   is limited without intravenous contrast.    T12 three column spine fracture-malalignment with fracture extending to   the adjacent right 12th posterior rib and left T12-L1 facet joint.   Heterogenous spinal canal at the level of the fracture, which is   difficult to assess due to artifact. Recommend MR to assess for and/or   soft tissue/ligamentous-cord injury.    Acute, displaced fracture of the left eighth lateral rib. Chronic   appearing bilateral rib deformities. No pneumothorax. Dependent right   lower lobe opacity, which may be due to atelectasis although pulmonary   contusion is not excludedgiven adjacent injury.    EXAMINATION:  PHYSICAL EXAM:    Constitutional: No Acute Distress     Neurological: Awake, alert oriented to person, place and time, Following Commands, PERRL, EOMI, No Gaze Preference, Face Symmetrical, Speech Fluent, No dysmetria, No ataxia, No nystagmus     Motor exam:          Upper extremity                         Delt     Bicep     Tricep    HG                                                 R         5/5        5/5        5/5       5/5                                               L          5/5        5/5        5/5       5/5          Lower extremity                        HF         KF        KE       DF         PF                                                  R        5/5 5/5 5/5 5/5 5/5                                               L         5/5 5/5 5/5 5/5 5/5                                                 Sensation: [X ] intact to light touch  [ ] decreased:     Reflexes: Deep Tendon Reflexes Intact     Pulmonary: Clear to Auscultation, No rales, No rhonchi, No wheezes     Cardiovascular: S1, S2, Regular rate and rhythm     Gastrointestinal: Soft, Non-tender, Non-distended     Extremities: No calf tenderness     Incision: cdi

## 2024-09-29 NOTE — PROGRESS NOTE ADULT - SUBJECTIVE AND OBJECTIVE BOX
Patient seen and examined at bedside.    --Anticoagulation--    T(C): 37.2 (09-28-24 @ 23:00), Max: 37.4 (09-28-24 @ 18:07)  HR: 58 (09-28-24 @ 23:30) (54 - 75)  BP: 170/73 (09-28-24 @ 23:30) (96/49 - 183/103)  RR: 21 (09-28-24 @ 23:30) (15 - 25)  SpO2: 97% (09-28-24 @ 23:30) (92% - 100%)  Wt(kg): --    Exam:  Awake, Ox3, BUE 5/5, BLE HF 5/5, KF/KE 5/5, PF/DF 5/5. Mildly dec sensation BLE stocking distribution up to ankles (baseline) No axial TTP, no palpable step offs or deformities.

## 2024-09-29 NOTE — BRIEF OPERATIVE NOTE - NSICDXBRIEFPROCEDURE_GEN_ALL_CORE_FT
PROCEDURES:  Fusion, spine, thoracolumbar, posterior approach 29-Sep-2024 09:26:15  Jackson Gregorio

## 2024-09-29 NOTE — BRIEF OPERATIVE NOTE - OPERATION/FINDINGS
T10-L2 percutaneous posterior instrumentation and fusion  T9-L3 posterior instrumentation and fusion, PSO and interbody

## 2024-09-29 NOTE — PROGRESS NOTE ADULT - ASSESSMENT
ASSESSMENT: HPI:  74F on celecoxib for OA last took y’day otherwise no AC/AP h/o HTN, gastroparesis, peripheral neuropathy, multiple prior thoracolumbar spine surgeries for congenital scoliosis [place] w/ removal of hardware 1991 presents with back pain s/p mechanical fall from sitting y'day. Describes severe mid back pain and left hip pain, no numbness, weakness, saddle anesthesia, b/b symptoms. Pain worsened with movement, axial loading. CT TL spine w/ acute nondisplaced fracture of T12 vertebral body extending into right pedicle w/o clear cord impingement. (28 Sep 2024 15:45)      NEURO:  #T12 fracture  POD 0 s/p T9-L3 open fusion w/ PSO/lag screw partial corpectomy/interbody  Neuro Q1H  MAP goals >85   #Pain control with PCA pump  HOB <30  continue lyrica and robaxin  PT/OT    CV:  maintain MAP> 85 for spinal cord perfusion   Wean off cullen  home losartan held for MAP goals     PULM:  Incentive spirometry  on room air   sat >92%     RENAL:  IVF while NPO and iso PCA pump  NS @ 70cc   monitor i/os   Replete lytes PRN    GI:  h/o GERD   Diet: Advanced as tolerated  GI prophylaxis [] not indicated [] PPI [x] other: pepcid, home medication   Bowel regimen [] colace [x] senna [x] other: senna, miralax, dulcolax   zofran     ENDO:   Goal euglycemia (-180)    HEME/ONC:  VTE prophylaxis: [x] SCDs [] chemoprophylaxis [x] hold chemoprophylaxis due to: preop in AM [] high risk of DVT/PE on admission due to:  LED -p     ID:  afebrile   monitor fever curve     CODE STATUS:  [x] Full Code [] DNR [] DNI [] Palliative/Comfort Care    DISPOSITION:  [x] ICU [] Stroke Unit [] Floor [] EMU [] RCU [] PCU

## 2024-09-30 LAB
ADD ON TEST-SPECIMEN IN LAB: SIGNIFICANT CHANGE UP
ANION GAP SERPL CALC-SCNC: 10 MMOL/L — SIGNIFICANT CHANGE UP (ref 5–17)
ANION GAP SERPL CALC-SCNC: 10 MMOL/L — SIGNIFICANT CHANGE UP (ref 5–17)
BUN SERPL-MCNC: 12 MG/DL — SIGNIFICANT CHANGE UP (ref 7–23)
BUN SERPL-MCNC: 16 MG/DL — SIGNIFICANT CHANGE UP (ref 7–23)
CALCIUM SERPL-MCNC: 8.1 MG/DL — LOW (ref 8.4–10.5)
CALCIUM SERPL-MCNC: 8.2 MG/DL — LOW (ref 8.4–10.5)
CHLORIDE SERPL-SCNC: 102 MMOL/L — SIGNIFICANT CHANGE UP (ref 96–108)
CHLORIDE SERPL-SCNC: 108 MMOL/L — SIGNIFICANT CHANGE UP (ref 96–108)
CO2 SERPL-SCNC: 22 MMOL/L — SIGNIFICANT CHANGE UP (ref 22–31)
CO2 SERPL-SCNC: 24 MMOL/L — SIGNIFICANT CHANGE UP (ref 22–31)
CREAT SERPL-MCNC: 0.69 MG/DL — SIGNIFICANT CHANGE UP (ref 0.5–1.3)
CREAT SERPL-MCNC: 0.87 MG/DL — SIGNIFICANT CHANGE UP (ref 0.5–1.3)
EGFR: 69 ML/MIN/1.73M2 — SIGNIFICANT CHANGE UP
EGFR: 90 ML/MIN/1.73M2 — SIGNIFICANT CHANGE UP
GLUCOSE SERPL-MCNC: 109 MG/DL — HIGH (ref 70–99)
GLUCOSE SERPL-MCNC: 89 MG/DL — SIGNIFICANT CHANGE UP (ref 70–99)
HCT VFR BLD CALC: 29.5 % — LOW (ref 34.5–45)
HCT VFR BLD CALC: 29.6 % — LOW (ref 34.5–45)
HGB BLD-MCNC: 9.4 G/DL — LOW (ref 11.5–15.5)
HGB BLD-MCNC: 9.9 G/DL — LOW (ref 11.5–15.5)
MAGNESIUM SERPL-MCNC: 1.9 MG/DL — SIGNIFICANT CHANGE UP (ref 1.6–2.6)
MAGNESIUM SERPL-MCNC: 1.9 MG/DL — SIGNIFICANT CHANGE UP (ref 1.6–2.6)
MAGNESIUM SERPL-MCNC: 2.3 MG/DL — SIGNIFICANT CHANGE UP (ref 1.6–2.6)
MCHC RBC-ENTMCNC: 27.7 PG — SIGNIFICANT CHANGE UP (ref 27–34)
MCHC RBC-ENTMCNC: 28 PG — SIGNIFICANT CHANGE UP (ref 27–34)
MCHC RBC-ENTMCNC: 31.9 GM/DL — LOW (ref 32–36)
MCHC RBC-ENTMCNC: 33.4 GM/DL — SIGNIFICANT CHANGE UP (ref 32–36)
MCV RBC AUTO: 83.9 FL — SIGNIFICANT CHANGE UP (ref 80–100)
MCV RBC AUTO: 87 FL — SIGNIFICANT CHANGE UP (ref 80–100)
NRBC # BLD: 0 /100 WBCS — SIGNIFICANT CHANGE UP (ref 0–0)
NRBC # BLD: 0 /100 WBCS — SIGNIFICANT CHANGE UP (ref 0–0)
PHOSPHATE SERPL-MCNC: 1.6 MG/DL — LOW (ref 2.5–4.5)
PHOSPHATE SERPL-MCNC: 2.6 MG/DL — SIGNIFICANT CHANGE UP (ref 2.5–4.5)
PHOSPHATE SERPL-MCNC: 3.7 MG/DL — SIGNIFICANT CHANGE UP (ref 2.5–4.5)
PLATELET # BLD AUTO: 212 K/UL — SIGNIFICANT CHANGE UP (ref 150–400)
PLATELET # BLD AUTO: 223 K/UL — SIGNIFICANT CHANGE UP (ref 150–400)
POTASSIUM SERPL-MCNC: 3 MMOL/L — LOW (ref 3.5–5.3)
POTASSIUM SERPL-MCNC: 4.2 MMOL/L — SIGNIFICANT CHANGE UP (ref 3.5–5.3)
POTASSIUM SERPL-SCNC: 3 MMOL/L — LOW (ref 3.5–5.3)
POTASSIUM SERPL-SCNC: 4.2 MMOL/L — SIGNIFICANT CHANGE UP (ref 3.5–5.3)
RBC # BLD: 3.39 M/UL — LOW (ref 3.8–5.2)
RBC # BLD: 3.53 M/UL — LOW (ref 3.8–5.2)
RBC # FLD: 14.3 % — SIGNIFICANT CHANGE UP (ref 10.3–14.5)
RBC # FLD: 14.7 % — HIGH (ref 10.3–14.5)
SODIUM SERPL-SCNC: 136 MMOL/L — SIGNIFICANT CHANGE UP (ref 135–145)
SODIUM SERPL-SCNC: 140 MMOL/L — SIGNIFICANT CHANGE UP (ref 135–145)
WBC # BLD: 10.92 K/UL — HIGH (ref 3.8–10.5)
WBC # BLD: 13.67 K/UL — HIGH (ref 3.8–10.5)
WBC # FLD AUTO: 10.92 K/UL — HIGH (ref 3.8–10.5)
WBC # FLD AUTO: 13.67 K/UL — HIGH (ref 3.8–10.5)

## 2024-09-30 PROCEDURE — 99292 CRITICAL CARE ADDL 30 MIN: CPT

## 2024-09-30 PROCEDURE — 93970 EXTREMITY STUDY: CPT | Mod: 26

## 2024-09-30 PROCEDURE — 99222 1ST HOSP IP/OBS MODERATE 55: CPT

## 2024-09-30 PROCEDURE — 99291 CRITICAL CARE FIRST HOUR: CPT

## 2024-09-30 RX ORDER — SODIUM CHLORIDE 0.9 % (FLUSH) 0.9 %
1000 SYRINGE (ML) INJECTION ONCE
Refills: 0 | Status: COMPLETED | OUTPATIENT
Start: 2024-09-30 | End: 2024-09-30

## 2024-09-30 RX ORDER — POTASSIUM PHOSPHATE, MONOBASIC POTASSIUM PHOSPHATE, DIBASIC 224; 236 MG/ML; MG/ML
30 INJECTION, SOLUTION, CONCENTRATE INTRAVENOUS ONCE
Refills: 0 | Status: COMPLETED | OUTPATIENT
Start: 2024-09-30 | End: 2024-09-30

## 2024-09-30 RX ORDER — OXYCODONE HYDROCHLORIDE 30 MG/1
10 TABLET, FILM COATED, EXTENDED RELEASE ORAL EVERY 4 HOURS
Refills: 0 | Status: DISCONTINUED | OUTPATIENT
Start: 2024-09-30 | End: 2024-10-01

## 2024-09-30 RX ORDER — MIDODRINE HYDROCHLORIDE 5 MG/1
5 TABLET ORAL EVERY 8 HOURS
Refills: 0 | Status: DISCONTINUED | OUTPATIENT
Start: 2024-09-30 | End: 2024-10-01

## 2024-09-30 RX ORDER — MAGNESIUM SULFATE 500 MG/ML
2 VIAL (ML) INJECTION ONCE
Refills: 0 | Status: COMPLETED | OUTPATIENT
Start: 2024-09-30 | End: 2024-09-30

## 2024-09-30 RX ORDER — PHENYLEPHRINE TANNATE 10 MG/5 ML
0.1 SUSPENSION, ORAL (FINAL DOSE FORM) ORAL
Qty: 40 | Refills: 0 | Status: DISCONTINUED | OUTPATIENT
Start: 2024-09-30 | End: 2024-10-01

## 2024-09-30 RX ORDER — OXYCODONE HYDROCHLORIDE 30 MG/1
5 TABLET, FILM COATED, EXTENDED RELEASE ORAL EVERY 4 HOURS
Refills: 0 | Status: DISCONTINUED | OUTPATIENT
Start: 2024-09-30 | End: 2024-10-01

## 2024-09-30 RX ORDER — HYDROMORPHONE HYDROCHLORIDE 1 MG/ML
0.5 INJECTION, SOLUTION INTRAMUSCULAR; INTRAVENOUS; SUBCUTANEOUS
Refills: 0 | Status: DISCONTINUED | OUTPATIENT
Start: 2024-09-30 | End: 2024-10-07

## 2024-09-30 RX ORDER — POTASSIUM PHOSPHATE, MONOBASIC POTASSIUM PHOSPHATE, DIBASIC 224; 236 MG/ML; MG/ML
30 INJECTION, SOLUTION, CONCENTRATE INTRAVENOUS ONCE
Refills: 0 | Status: COMPLETED | OUTPATIENT
Start: 2024-09-30 | End: 2024-10-01

## 2024-09-30 RX ADMIN — MIDODRINE HYDROCHLORIDE 5 MILLIGRAM(S): 5 TABLET ORAL at 21:08

## 2024-09-30 RX ADMIN — PREGABALIN 50 MILLIGRAM(S): 25 CAPSULE ORAL at 21:08

## 2024-09-30 RX ADMIN — Medication 2 TABLET(S): at 21:08

## 2024-09-30 RX ADMIN — PREGABALIN 50 MILLIGRAM(S): 25 CAPSULE ORAL at 05:13

## 2024-09-30 RX ADMIN — Medication 500 MILLIGRAM(S): at 05:13

## 2024-09-30 RX ADMIN — POTASSIUM PHOSPHATE, MONOBASIC POTASSIUM PHOSPHATE, DIBASIC 83.33 MILLIMOLE(S): 224; 236 INJECTION, SOLUTION, CONCENTRATE INTRAVENOUS at 12:48

## 2024-09-30 RX ADMIN — Medication 17 GRAM(S): at 17:47

## 2024-09-30 RX ADMIN — Medication 500 MILLIGRAM(S): at 21:08

## 2024-09-30 RX ADMIN — Medication 3.32 MICROGRAM(S)/KG/MIN: at 19:03

## 2024-09-30 RX ADMIN — Medication 40 MILLIEQUIVALENT(S): at 12:48

## 2024-09-30 RX ADMIN — MIDODRINE HYDROCHLORIDE 5 MILLIGRAM(S): 5 TABLET ORAL at 13:40

## 2024-09-30 RX ADMIN — HYDROMORPHONE HYDROCHLORIDE 30 MILLILITER(S): 1 INJECTION, SOLUTION INTRAMUSCULAR; INTRAVENOUS; SUBCUTANEOUS at 07:26

## 2024-09-30 RX ADMIN — OXYCODONE HYDROCHLORIDE 10 MILLIGRAM(S): 30 TABLET, FILM COATED, EXTENDED RELEASE ORAL at 23:59

## 2024-09-30 RX ADMIN — Medication 1000 MILLILITER(S): at 01:30

## 2024-09-30 RX ADMIN — OXYCODONE HYDROCHLORIDE 10 MILLIGRAM(S): 30 TABLET, FILM COATED, EXTENDED RELEASE ORAL at 18:47

## 2024-09-30 RX ADMIN — PREGABALIN 50 MILLIGRAM(S): 25 CAPSULE ORAL at 13:40

## 2024-09-30 RX ADMIN — Medication 1000 MILLIGRAM(S): at 13:40

## 2024-09-30 RX ADMIN — MULTI VITAMIN/MINERAL SUPPLEMENT WITH ASCORBIC ACID, NIACIN, PYRIDOXINE, PANTOTHENIC ACID, FOLIC ACID, RIBOFLAVIN, THIAMIN, BIOTIN, COBALAMIN AND ZINC. 1 TABLET(S): 60; 20; 12.5; 10; 10; 1.7; 1.5; 1; .3; .006 TABLET, COATED ORAL at 11:18

## 2024-09-30 RX ADMIN — Medication 40 MILLIEQUIVALENT(S): at 13:40

## 2024-09-30 RX ADMIN — Medication 25 GRAM(S): at 22:18

## 2024-09-30 RX ADMIN — OXYCODONE HYDROCHLORIDE 10 MILLIGRAM(S): 30 TABLET, FILM COATED, EXTENDED RELEASE ORAL at 17:47

## 2024-09-30 RX ADMIN — Medication 1000 MILLIGRAM(S): at 21:08

## 2024-09-30 RX ADMIN — Medication 3.32 MICROGRAM(S)/KG/MIN: at 01:51

## 2024-09-30 RX ADMIN — Medication 500 MILLIGRAM(S): at 13:40

## 2024-09-30 RX ADMIN — Medication 70 MILLILITER(S): at 19:03

## 2024-09-30 RX ADMIN — Medication 1000 MILLIGRAM(S): at 05:13

## 2024-09-30 NOTE — OCCUPATIONAL THERAPY INITIAL EVALUATION ADULT - PERTINENT HX OF CURRENT PROBLEM, REHAB EVAL
74F on celecoxib for OA , otherwise no AC/AP h/o HTN, gastroparesis, peripheral neuropathy, multiple prior thoracolumbar spine surgeries for congenital scoliosis done >[place] ago out of state w/ removal of hardware 1991 presents with back pain s/p mechanical fall from sitting. Describes severe mid back pain and left hip pain, no numbness, weakness, saddle anesthesia, b/b symptoms. Pain worsened with movement, axial loading. CT TL spine w/ acute nondisplaced fracture of T12 veterbral body extending into right pedicle w/o clear cord impingement.  CT thoracic spine-Evaluation of the thoracic, abdominal and pelvic organs and vasculature is limited without intravenous contrast.T12 three column spine fracture-malalignment with fracture extending to the adjacent right 12th posterior rib and left T12-L1 facet joint. Heterogenous spinal canal at the level of the fracture, which is difficult to assess due to artifact. Recommend MR to assess for and/or soft tissue/ligamentous-cord injury. Acute, displaced fracture of the left eighth lateral rib. Chronic appearing bilateral rib deformities. No pneumothorax. Dependent right lower lobe opacity, which may be due to atelectasis although pulmonary contusion is not excluded given adjacent injury.  CT head: No obvious acute intracranial hemorrhage or displaced skull fracture. If clinically indicated, short-term follow-up or MRI may be obtained for further evaluation.  Cervical spine CT: No obvious acute fracture or traumatic malalignment in the cervical spine. If there is clinical suspicion for acute fracture or ligamentous/cord injury, MRI may be obtained for further evaluation.  9/29 for T9-L3 posterior instrumentation and fusion, PSO and interbody; with dural tear and flap.

## 2024-09-30 NOTE — CONSULT NOTE ADULT - SUBJECTIVE AND OBJECTIVE BOX
Patient is a 74y old  Female who presents with a chief complaint of T9-L3 fusion (30 Sep 2024 09:43)    Admission HPI:  74F on celecoxib for OA last took y’day otherwise no AC/AP h/o HTN, gastroparesis, peripheral neuropathy, multiple prior thoracolumbar spine surgeries for congenital scoliosis done >[place] years ago out of state w/ removal of hardware 1991 presents with back pain s/p mechanical fall from sitting y'day. Describes severe mid back pain and left hip pain, no numbness, weakness, saddle anesthesia, b/b symptoms. Pain worsened with movement, axial loading. CT TL spine w/ acute nondisplaced fracture of T12 veterbral body extending into right pedicle w/o clear cord impingement. (28 Sep 2024 15:45)    Interval History:  Patient went to OR on 9/29 for T9-L3 posterior instrumentation and fusion, PSO and interbody; with dural tear and flap.   Post-op course relatively uncomplicated.    REVIEW OF SYSTEMS: No chest pain, shortness of breath, nausea, vomiting or diarhea; other ROS neg     PAST MEDICAL & SURGICAL HISTORY  H/O seasonal allergies    History of pulmonary embolism    Restless leg syndrome    GERD (gastroesophageal reflux disease)    HTN (hypertension)    OA (osteoarthritis)    Fungal infection of skin    H/O scoliosis    Essential hypertension    Depression    Osteoporosis    Right hip pain    2019 novel coronavirus disease (COVID-19)    Hiatal hernia    History of chronic pain    Peripheral neuropathy    S/P repair of paraesophageal hernia    S/P spinal fusion    Scoliosis    S/P spinal surgery    Removal of pin, plate, abigail, or screw    S/P hysterectomy    S/P hip replacement    S/P shoulder surgery    S/P dilatation and curettage    H/O arthroscopy of knee    H/O total knee replacement, right    S/P cataract surgery    History of bilateral hip replacements    FUNCTIONAL HISTORY:   Lives   Independent    FAMILY HISTORY   Family history of abdominal aortic aneurysm (AAA) (Mother)    MEDICATIONS   acetaminophen     Tablet .. 1000 milliGRAM(s) Oral every 8 hours  bisacodyl 5 milliGRAM(s) Oral every 12 hours PRN  HYDROmorphone PCA (1 mG/mL) 30 milliLiter(s) PCA Continuous PCA Continuous  HYDROmorphone PCA (1 mG/mL) Rescue Clinician Bolus 0.5 milliGRAM(s) IV Push every 15 minutes PRN  magnesium hydroxide Suspension 30 milliLiter(s) Oral every 12 hours PRN  methocarbamol 500 milliGRAM(s) Oral every 8 hours  multivitamin 1 Tablet(s) Oral daily  naloxone Injectable 0.1 milliGRAM(s) IV Push every 3 minutes PRN  ondansetron   Disintegrating Tablet 4 milliGRAM(s) Oral every 6 hours PRN  ondansetron Injectable 4 milliGRAM(s) IV Push every 6 hours PRN  phenylephrine    Infusion 0.1 MICROgram(s)/kG/Min IV Continuous <Continuous>  polyethylene glycol 3350 17 Gram(s) Oral every 24 hours  pregabalin 50 milliGRAM(s) Oral three times a day  senna 2 Tablet(s) Oral at bedtime  sodium chloride 0.9%. 1000 milliLiter(s) IV Continuous <Continuous>    ALLERGIES  penicillins (Urticaria)  erythromycin (Stomach Upset; Vomiting; Nausea)  Dilantin (Urticaria)      VITALS  T(C): 37.4 (09-30-24 @ 07:00), Max: 37.4 (09-30-24 @ 07:00)  HR: 92 (09-30-24 @ 09:45) (59 - 92)  BP: --  RR: 20 (09-30-24 @ 09:45) (12 - 26)  SpO2: 100% (09-30-24 @ 09:45) (86% - 100%)  Wt(kg): --    PHYSICAL EXAM  Constitutional - NAD, Comfortable  HEENT - NCAT, EOMI  Neck - Supple  Chest - No distress, no use of accessory muscles for respiration  Cardiovascular -Well perfused  Abdomen - BS+, Soft, NTND  Extremities - No C/C/E, No calf tenderness   Neurologic Exam -                    Cognitive - Awake, Alert, AAO to self, place, date, year, situation     Communication - Fluent, No dysarthria, no aphasia     Cranial Nerves - CN 2-12 intact     Motor - No focal deficits      Sensory - Intact to LT     Reflexes - DTR Intact, No primitive reflexive  Psychiatric - Mood stable, Affect WNL    RECENT LABS/IMAGING  CBC Full  -  ( 29 Sep 2024 19:31 )  WBC Count : 13.32 K/uL  RBC Count : 4.13 M/uL  Hemoglobin : 12.1 g/dL  Hematocrit : 35.6 %  Platelet Count - Automated : 199 K/uL  Mean Cell Volume : 86.2 fl  Mean Cell Hemoglobin : 29.3 pg  Mean Cell Hemoglobin Concentration : 34.0 gm/dL  Auto Neutrophil # : 11.76 K/uL  Auto Lymphocyte # : 0.92 K/uL  Auto Monocyte # : 0.48 K/uL  Auto Eosinophil # : 0.00 K/uL  Auto Basophil # : 0.03 K/uL  Auto Neutrophil % : 88.3 %  Auto Lymphocyte % : 6.9 %  Auto Monocyte % : 3.6 %  Auto Eosinophil % : 0.0 %  Auto Basophil % : 0.2 %    09-29    134[L]  |  100  |  13  ----------------------------<  148[H]  3.5   |  21[L]  |  0.55    Ca    8.9      29 Sep 2024 19:31  Phos  3.7     09-29  Mg     2.3     09-29      Urinalysis Basic - ( 29 Sep 2024 19:31 )    Color: x / Appearance: x / SG: x / pH: x  Gluc: 148 mg/dL / Ketone: x  / Bili: x / Urobili: x   Blood: x / Protein: x / Nitrite: x   Leuk Esterase: x / RBC: x / WBC x   Sq Epi: x / Non Sq Epi: x / Bacteria: x    Impression:  75 yo with functional deficits secondary to diagnosis of Scoliosis    Plan:  PT- ROM, Bed Mob, Transfers, Amb w AD and bracing as needed  OT- ADLs, bracing  Prec- Falls, Cardiac  DVT Prophylaxis - SCDs  Skin- Turn q2 h  Dispo-     Total time taken to review relevant records and imaging results, examine patient, write note, and, when applicable, discuss case with patient, family, , resident, medical student and other medical providers:     [  ] 40 minutes (16777)  [  ] 55 minutes (49418)  [  ] 75 minutes ([id number])    [  ] 25 minutes (92552)  [  ] 35 minutes ([id number])  [  ] 50 minutes ([id number])           Patient is a 74y old  Female who presents with a chief complaint of T9-L3 fusion (30 Sep 2024 09:43)    Admission HPI:  74F on celecoxib for OA last took y’day otherwise no AC/AP h/o HTN, gastroparesis, peripheral neuropathy, multiple prior thoracolumbar spine surgeries for congenital scoliosis done >10 years ago out of state w/ removal of hardware 1991 presents with back pain s/p mechanical fall from sitting y'day. Describes severe mid back pain and left hip pain, no numbness, weakness, saddle anesthesia, b/b symptoms. Pain worsened with movement, axial loading. CT TL spine w/ acute nondisplaced fracture of T12 veterbral body extending into right pedicle w/o clear cord impingement. (28 Sep 2024 15:45)    Interval History:  Patient went to OR on 9/29 for T9-L3 posterior instrumentation and fusion, PSO and interbody; with dural tear and flap.   Post-op course relatively uncomplicated.    Patient's sons' at bedside.     REVIEW OF SYSTEMS: + back pain - controlled w meds, + Blake, No chest pain, shortness of breath, nausea, vomiting or diarhea; other ROS neg     PAST MEDICAL & SURGICAL HISTORY  H/O seasonal allergies    History of pulmonary embolism    Restless leg syndrome    GERD (gastroesophageal reflux disease)    HTN (hypertension)    OA (osteoarthritis)    Fungal infection of skin    H/O scoliosis    Essential hypertension    Depression    Osteoporosis    Right hip pain    2019 novel coronavirus disease (COVID-19)    Hiatal hernia    History of chronic pain    Peripheral neuropathy    S/P repair of paraesophageal hernia    S/P spinal fusion    Scoliosis    S/P spinal surgery    Removal of pin, plate, abigail, or screw    S/P hysterectomy    S/P hip replacement    S/P shoulder surgery    S/P dilatation and curettage    H/O arthroscopy of knee    H/O total knee replacement, right    S/P cataract surgery    History of bilateral hip replacements    FUNCTIONAL HISTORY:   Lives   Independent    FAMILY HISTORY   Family history of abdominal aortic aneurysm (AAA) (Mother)    MEDICATIONS   acetaminophen     Tablet .. 1000 milliGRAM(s) Oral every 8 hours  bisacodyl 5 milliGRAM(s) Oral every 12 hours PRN  HYDROmorphone PCA (1 mG/mL) 30 milliLiter(s) PCA Continuous PCA Continuous  HYDROmorphone PCA (1 mG/mL) Rescue Clinician Bolus 0.5 milliGRAM(s) IV Push every 15 minutes PRN  magnesium hydroxide Suspension 30 milliLiter(s) Oral every 12 hours PRN  methocarbamol 500 milliGRAM(s) Oral every 8 hours  multivitamin 1 Tablet(s) Oral daily  naloxone Injectable 0.1 milliGRAM(s) IV Push every 3 minutes PRN  ondansetron   Disintegrating Tablet 4 milliGRAM(s) Oral every 6 hours PRN  ondansetron Injectable 4 milliGRAM(s) IV Push every 6 hours PRN  phenylephrine    Infusion 0.1 MICROgram(s)/kG/Min IV Continuous <Continuous>  polyethylene glycol 3350 17 Gram(s) Oral every 24 hours  pregabalin 50 milliGRAM(s) Oral three times a day  senna 2 Tablet(s) Oral at bedtime  sodium chloride 0.9%. 1000 milliLiter(s) IV Continuous <Continuous>    ALLERGIES  penicillins (Urticaria)  erythromycin (Stomach Upset; Vomiting; Nausea)  Dilantin (Urticaria)      VITALS  T(C): 37.4 (09-30-24 @ 07:00), Max: 37.4 (09-30-24 @ 07:00)  HR: 92 (09-30-24 @ 09:45) (59 - 92)  BP: --  RR: 20 (09-30-24 @ 09:45) (12 - 26)  SpO2: 100% (09-30-24 @ 09:45) (86% - 100%)  Wt(kg): --    PHYSICAL EXAM  Constitutional - NAD, Comfortable  HEENT - NCAT, EOMI  Neck - Supple  Chest - No distress, no use of accessory muscles for respiration  Cardiovascular -Well perfused  Abdomen - BS+, Soft, NTND  Extremities - No C/C/E, No calf tenderness   Neurologic Exam -      AAO x 3  Motor 4+/5 bl UE and LEs  No clonus  Sensation intact  Psychiatric - Mood stable, Affect WNL    RECENT LABS/IMAGING  CBC Full  -  ( 29 Sep 2024 19:31 )  WBC Count : 13.32 K/uL  RBC Count : 4.13 M/uL  Hemoglobin : 12.1 g/dL  Hematocrit : 35.6 %  Platelet Count - Automated : 199 K/uL  Mean Cell Volume : 86.2 fl  Mean Cell Hemoglobin : 29.3 pg  Mean Cell Hemoglobin Concentration : 34.0 gm/dL  Auto Neutrophil # : 11.76 K/uL  Auto Lymphocyte # : 0.92 K/uL  Auto Monocyte # : 0.48 K/uL  Auto Eosinophil # : 0.00 K/uL  Auto Basophil # : 0.03 K/uL  Auto Neutrophil % : 88.3 %  Auto Lymphocyte % : 6.9 %  Auto Monocyte % : 3.6 %  Auto Eosinophil % : 0.0 %  Auto Basophil % : 0.2 %    09-29    134[L]  |  100  |  13  ----------------------------<  148[H]  3.5   |  21[L]  |  0.55    Ca    8.9      29 Sep 2024 19:31  Phos  3.7     09-29  Mg     2.3     09-29      Urinalysis Basic - ( 29 Sep 2024 19:31 )    Color: x / Appearance: x / SG: x / pH: x  Gluc: 148 mg/dL / Ketone: x  / Bili: x / Urobili: x   Blood: x / Protein: x / Nitrite: x   Leuk Esterase: x / RBC: x / WBC x   Sq Epi: x / Non Sq Epi: x / Bacteria: x    Impression:  73 yo with functional deficits secondary to diagnosis of Scoliosis    Plan:  PT- ROM, Bed Mob, Transfers, Amb w AD and bracing as needed  OT- ADLs, bracing  Prec- Falls, Cardiac  Monitor Na+  Bladder- Blake, consider TOV  DVT Prophylaxis - SCDs  Skin- Turn q2 h  Dispo- Acute Rehab- patient requires active and ongoing therapeutic interventions of multiple disciplines and can tolerate and benefit from 3 hours of intensive therapies x 2-4wks depending on progress at rehabilitation facility. Can actively participate and benefit from  an intensive rehabilitation program. Requires supervision by a rehabilitation physician and a coordinated interdisciplinary approach to providing rehabilitation.     discussed rehab plan, functional prognosis and rehab options w patient and her sons at bedside.     Total time taken to review relevant records and imaging results, examine patient, write note, and, when applicable, discuss case with patient, family, , resident, medical student and other medical providers:     [  ] 40 minutes (55523)  [X] 55 minutes (08851)  [  ] 75 minutes (16553)    [  ] 25 minutes (58250)  [  ] 35 minutes (58072)  [  ] 50 minutes (70057)

## 2024-09-30 NOTE — OCCUPATIONAL THERAPY INITIAL EVALUATION ADULT - WEIGHT-BEARING RESTRICTIONS: SIT/STAND, REHAB EVAL
full weight-bearing Xeljanz Counseling: I discussed with the patient the risks of Xeljanz therapy including increased risk of infection, liver issues, headache, diarrhea, or cold symptoms. Live vaccines should be avoided. They were instructed to call if they have any problems.

## 2024-09-30 NOTE — PHYSICAL THERAPY INITIAL EVALUATION ADULT - ADDITIONAL COMMENTS
Patient lives with her son in an elevator accessible apartment building with no stairs to enter. PTA, pt. was independent in ADLs & mobility with RW. As per patient, her son is a special child & requires assistance.

## 2024-09-30 NOTE — PROGRESS NOTE ADULT - SUBJECTIVE AND OBJECTIVE BOX
Patient seen and examined at bedside.    --Anticoagulation--    T(C): 36.6 (09-29-24 @ 23:00), Max: 36.9 (09-29-24 @ 05:00)  HR: 83 (09-30-24 @ 00:00) (53 - 83)  BP: 169/79 (09-29-24 @ 09:45) (121/58 - 210/87)  RR: 19 (09-30-24 @ 00:00) (15 - 31)  SpO2: 100% (09-30-24 @ 00:00) (96% - 100%)  Wt(kg): --    Exam: AOx3, PERRL, EOMI, FC, RIVERS 5/5

## 2024-09-30 NOTE — PROGRESS NOTE ADULT - ASSESSMENT
ASSESSMENT: HPI: 74F on celecoxib for OA last took y’day otherwise no AC/AP h/o HTN, gastroparesis, peripheral neuropathy, multiple prior thoracolumbar spine surgeries for congenital scoliosis done >[place] of state w/ removal of hardware 1991 presents with back pain s/p mechanical fall from sitting y'day. Describes severe mid back pain and left hip pain, no numbness, weakness, saddle anesthesia, b/b symptoms. Pain worsened with movement, axial loading. CT TL spine w/ acute nondisplaced fracture of T12 veterbral body extending into right pedicle w/o clear cord impingement.     s/p  T9-L3 open fusion with PSO/lag screw partial corpectomy/interbody with complex plastic closure (09/29/24)    NEURO:  - Continue to monitor output from left superfiical RADHA drain.    - Continue with neuro checks q1  - Continue pain regimen: lyrica and robaxin  - Activity: [x] OOB as tolerated [] Bedrest [] PT [] OT [] PMNR    PULM:  - Incentive spirometry, mobilize as tolerated  - satting >92% on room air    CV:  - Keep MAP >85  - TTE pending  - Wean off cullen, start midodrine 5 Q8  - home losartan held for MAP goals     RENAL:  - IVF until good PO intake  - Continue to monitor BMP   - Continue to monitor Is&Os    GI:  - Diet: Regular  - GI prophylaxis [] not indicated [] PPI [] other: pepcid (home med for GERD)  - Bowel regimen [x] miralax  [x] senna [] other:  - LBM: prior to admission    ENDO:   - Goal euglycemia (-180)    HEME/ONC:  - VTE prophylaxis: [x] SCDs [] chemoprophylaxis [] hold chemoprophylaxis due to: [] high risk of DVT/PE on admission due to:  - LED done on (09/30):negative    ID:  - afebrile    MISC:    SOCIAL/FAMILY:  [x] awaiting [] updated at bedside [] family meeting    CODE STATUS:  [x] Full Code [] DNR [] DNI [] Palliative/Comfort Care    DISPOSITION:  [x] ICU [] Stroke Unit [] Floor [] EMU [] RCU [] PCU    [x] Patient is at high risk of neurologic deterioration/death due to: pending clinical course    Time seen:  Time spent: __35_ [x] critical care minutes    Contact: [phone] ASSESSMENT: HPI: 74F on celecoxib for OA last took y’day otherwise no AC/AP h/o HTN, gastroparesis, peripheral neuropathy, multiple prior thoracolumbar spine surgeries for congenital scoliosis done >10 years ago out of state w/ removal of hardware 1991 presents with back pain s/p mechanical fall from sitting y'day. Describes severe mid back pain and left hip pain, no numbness, weakness, saddle anesthesia, b/b symptoms. Pain worsened with movement, axial loading. CT TL spine w/ acute nondisplaced fracture of T12 veterbral body extending into right pedicle w/o clear cord impingement.     s/p  T9-L3 open fusion with PSO/lag screw partial corpectomy/interbody with complex plastic closure (09/29/24)    NEURO:  - Continue to monitor output from left superficial RADHA drain.  and R deep RADHA drain  - Continue with neuro checks q1  - Continue pain regimen: lyrica, robaxin, tylenol and dilaudid 0.5 q3hrs, oxycodone  - Activity: [x] OOB as tolerated [] Bedrest [] PT [] OT [] PMNR    PULM:  - Incentive spirometry, mobilize as tolerated  - satting >92% on room air, 2L NC when sleeping    CV:  - Keep MAP >85  - TTE pending  - Wean off cullen, midodrine 10 Q8  - home losartan held for MAP goals     RENAL:  - IVF until good PO intake  - Continue to monitor BMP   - Continue to monitor Is&Os    GI:  - Diet: Regular  - GI prophylaxis [] not indicated [] PPI [] other: pepcid (home med for GERD)  - Bowel regimen [x] miralax  [x] senna [] other:  - LBM: prior to admission    ENDO:   - Goal euglycemia (-180)    HEME/ONC:  - VTE prophylaxis: [x] SCDs [] chemoprophylaxis [] hold chemoprophylaxis due to: [] high risk of DVT/PE on admission due to:  - LED done on (09/30): negative    ID:  - afebrile    MISC:    SOCIAL/FAMILY:  [x] awaiting [] updated at bedside [] family meeting    CODE STATUS:  [x] Full Code [] DNR [] DNI [] Palliative/Comfort Care    DISPOSITION:  [x] ICU [] Stroke Unit [] Floor [] EMU [] RCU [] PCU    [x] Patient is at high risk of neurologic deterioration/death due to: pending clinical course    Time seen:  Time spent: __35_ [x] critical care minutes    Contact: [phone] ASSESSMENT: HPI: 74F on celecoxib for OA last took y’day otherwise no AC/AP h/o HTN, gastroparesis, peripheral neuropathy, multiple prior thoracolumbar spine surgeries for congenital scoliosis done >10 years ago out of state w/ removal of hardware 1991 presents with back pain s/p mechanical fall from sitting y'day. Describes severe mid back pain and left hip pain, no numbness, weakness, saddle anesthesia, b/b symptoms. Pain worsened with movement, axial loading. CT TL spine w/ acute nondisplaced fracture of T12 veterbral body extending into right pedicle w/o clear cord impingement.     s/p  T9-L3 open fusion with PSO/lag screw partial corpectomy/interbody with complex plastic closure (09/29/24)    NEURO:  - Continue to monitor output from left superficial RADHA drain.  and R deep RADHA drain  - Continue with neuro checks q4  - Continue pain regimen: lyrica, robaxin, tylenol and dilaudid 0.5 q3hrs, oxycodone  - Activity: [x] OOB as tolerated [] Bedrest [] PT [] OT [] PMNR    PULM:  - Incentive spirometry, mobilize as tolerated  - satting >92% on room air, 2L NC when sleeping    CV:  - Keep MAP >85  - TTE pending  - Wean off cullen, increased midodrine to 10 Q8  - home losartan held for MAP goals     RENAL:  - IVF until good PO intake  - Continue to monitor BMP   - Continue to monitor Is&Os    GI:  - Diet: Regular  - GI prophylaxis [] not indicated [] PPI [] other: pepcid (home med for GERD)  - Bowel regimen [x] miralax  [x] senna [] other:  - LBM: prior to admission    ENDO:   - Goal euglycemia (-180)    HEME/ONC:  - VTE prophylaxis: [x] SCDs [] chemoprophylaxis [] hold chemoprophylaxis due to: [] high risk of DVT/PE on admission due to:  - LED done on (09/30): negative    ID:  - afebrile    MISC:    SOCIAL/FAMILY:  [x] awaiting [] updated at bedside [] family meeting    CODE STATUS:  [x] Full Code [] DNR [] DNI [] Palliative/Comfort Care    DISPOSITION:  [x] ICU [] Stroke Unit [] Floor [] EMU [] RCU [] PCU    [x] Patient is at high risk of neurologic deterioration/death due to: pending clinical course    Time seen:  Time spent: __35_ [x] critical care minutes    Contact: [phone]

## 2024-09-30 NOTE — PHYSICAL THERAPY INITIAL EVALUATION ADULT - PERTINENT HX OF CURRENT PROBLEM, REHAB EVAL
74F on celecoxib for OA last took y’day otherwise no AC/AP h/o HTN, gastroparesis, peripheral neuropathy, multiple prior thoracolumbar spine surgeries for congenital scoliosis done >10 years ago out of state w/ removal of hardware 1991 presents with back pain s/p mechanical fall from sitting y'day. Describes severe mid back pain and left hip pain, no numbness, weakness, saddle anesthesia, b/b symptoms. Pain worsened with movement, axial loading. CT TL spine w/ acute nondisplaced fracture of T12 vertebral body extending into right pedicle w/o clear cord impingement. Hospital course: Patient is s/p T9-L3 posterior instrumentation and fusion, PSO and interbody. 74F on celecoxib for OA last took y’day otherwise no AC/AP h/o HTN, gastroparesis, peripheral neuropathy, multiple prior thoracolumbar spine surgeries for congenital scoliosis done >10 years ago out of state w/ removal of hardware 1991 presents with back pain s/p mechanical fall from sitting y'day. Describes severe mid back pain and left hip pain, no numbness, weakness, saddle anesthesia, b/b symptoms. Pain worsened with movement, axial loading. CT TL spine w/ acute nondisplaced fracture of T12 vertebral body extending into right pedicle w/o clear cord impingement. Hospital course: Patient is s/p T9-L3 posterior instrumentation and fusion, PSO and interbody (9/29)

## 2024-09-30 NOTE — PROGRESS NOTE ADULT - ASSESSMENT
- Post-op from T9-L3 open fusion w/ PSO/lag screw partial corpectomy/interbody w/ complex plastic closure  - L superficial RADHA, R deep RADHA  - HOB <30 deg   - Strict spine precautions   - No imaging   - LED-p

## 2024-09-30 NOTE — PROGRESS NOTE ADULT - SUBJECTIVE AND OBJECTIVE BOX
HOSPITAL COURSE:      Admission Scores  GCS:   HH:   MF:   NIHSS:   RASS:    CAM-ICU:   ICH:    24 hour Events:       Allergies    penicillins (Urticaria)  Dilantin (Urticaria)    Intolerances    erythromycin (Stomach Upset; Vomiting; Nausea)      REVIEW OF SYSTEMS: [ ] Unable to Assess due to neurologic exam   [ ] All ROS addressed below are non-contributory, except:  Neuro: [ ] Headache [ ] Back pain [ ] Numbness [ ] Weakness [ ] Ataxia [ ] Dizziness [ ] Aphasia [ ] Dysarthria [ ] Visual disturbance  Resp: [ ] Shortness of breath/dyspnea, [ ] Orthopnea [ ] Cough  CV: [ ] Chest pain [ ] Palpitation [ ] Lightheadedness [ ] Syncope  Renal: [ ] Thirst [ ] Edema  GI: [ ] Nausea [ ] Emesis [ ] Abdominal pain [ ] Constipation [ ] Diarrhea  Hem: [ ] Hematemesis [ ] bright red blood per rectum  ID: [ ] Fever [ ] Chills [ ] Dysuria  ENT: [ ] Rhinorrhea      DEVICES:   [ ] Restraints [ ] ET tube [ ] central line [ ] arterial line [ ] dumont [ ] NGT/OGT [ ] EVD [ ] LD [ ] RADHA/HMV [ ] Trach [ ] PEG [ ] Chest Tube     VITALS:   Vital Signs Last 24 Hrs  T(C): 36.9 (30 Sep 2024 15:00), Max: 37.4 (30 Sep 2024 07:00)  T(F): 98.4 (30 Sep 2024 15:00), Max: 99.4 (30 Sep 2024 07:00)  HR: 83 (30 Sep 2024 18:45) (59 - 100)  BP: 142/64 (30 Sep 2024 18:45) (97/55 - 166/72)  BP(mean): 92 (30 Sep 2024 18:45) (71 - 114)  RR: 19 (30 Sep 2024 18:45) (10 - 26)  SpO2: 92% (30 Sep 2024 18:45) (86% - 100%)    Parameters below as of 30 Sep 2024 15:00  Patient On (Oxygen Delivery Method): room air      CAPILLARY BLOOD GLUCOSE        I&O's Summary    29 Sep 2024 07:01  -  30 Sep 2024 07:00  --------------------------------------------------------  IN: 3036.4 mL / OUT: 2040 mL / NET: 996.4 mL    30 Sep 2024 07:01  -  30 Sep 2024 19:15  --------------------------------------------------------  IN: 1394.4 mL / OUT: 1900 mL / NET: -505.6 mL        Respiratory:    ABG - ( 29 Sep 2024 16:20 )  pH, Arterial: 7.32  pH, Blood: x     /  pCO2: 41    /  pO2: 143   / HCO3: 21    / Base Excess: -4.7  /  SaO2: 98.8                LABS:                        9.9    13.67 )-----------( 223      ( 30 Sep 2024 11:53 )             29.6     09-30    136  |  102  |  12  ----------------------------<  109[H]  3.0[L]   |  24  |  0.69             MEDICATION LEVELS:     IVF FLUIDS/MEDICATIONS:   MEDICATIONS  (STANDING):  acetaminophen     Tablet .. 1000 milliGRAM(s) Oral every 8 hours  methocarbamol 500 milliGRAM(s) Oral every 8 hours  midodrine 5 milliGRAM(s) Oral every 8 hours  multivitamin 1 Tablet(s) Oral daily  phenylephrine    Infusion 0.1 MICROgram(s)/kG/Min (3.32 mL/Hr) IV Continuous <Continuous>  polyethylene glycol 3350 17 Gram(s) Oral every 24 hours  pregabalin 50 milliGRAM(s) Oral three times a day  senna 2 Tablet(s) Oral at bedtime  sodium chloride 0.9%. 1000 milliLiter(s) (70 mL/Hr) IV Continuous <Continuous>    MEDICATIONS  (PRN):  bisacodyl 5 milliGRAM(s) Oral every 12 hours PRN Constipation  HYDROmorphone  Injectable 0.5 milliGRAM(s) IV Push every 3 hours PRN breakthru pain  magnesium hydroxide Suspension 30 milliLiter(s) Oral every 12 hours PRN Constipation  ondansetron   Disintegrating Tablet 4 milliGRAM(s) Oral every 6 hours PRN Nausea and/or Vomiting  ondansetron Injectable 4 milliGRAM(s) IV Push every 6 hours PRN Nausea  oxyCODONE    IR 10 milliGRAM(s) Oral every 4 hours PRN Severe Pain (7 - 10)  oxyCODONE    IR 5 milliGRAM(s) Oral every 4 hours PRN Moderate Pain (4 - 6)        IMAGING:   < from: [facility] Duplex Lower Ext Vein Scan, Bilat (09.30.24 @ 16:57) >  IMPRESSION:  No evidence of deep venous thrombosis in either lower extremity.      --- End of Report ---    MAGEN RODRIGUEZ MD; Attending Radiologist  This document has been electronically signed. Sep 30 2024  5:02PM    < end of copied text >        EXAMINATION:  PHYSICAL EXAM:    Constitutional: No Acute Distress     Neurological: Awake, alert oriented to person, place and time, Following Commands, PERRL, EOMI, No Gaze Preference, Face Symmetrical, Speech Fluent, No dysmetria, No ataxia, No nystagmus     Motor exam:          Upper extremity                         Delt     Bicep     Tricep    HG                                                 R         5/5        5/5        5/5       5/5                                               L          5/5        5/5        5/5       5/5          Lower extremity                        HF         KF        KE       DF         PF                                                  R        5/5        5/5        5/5       5/5         5/5                                               L         5/5        5/5       5/5       5/5          5/5                                                 Pulmonary: Clear to Auscultation, No rales, No rhonchi, No wheezes     Cardiovascular: S1, S2, Regular rate and rhythm     Gastrointestinal: Soft, Non-tender, Non-distended     Extremities: No calf tenderness        HOSPITAL COURSE: 74F on celecoxib for OA last took y’day otherwise no AC/AP h/o HTN, gastroparesis, peripheral neuropathy, multiple prior thoracolumbar spine surgeries for congenital scoliosis [place] >[place] ago [place] of state w/ removal of hardware 1991 presents with back pain s/p mechanical fall from sitting y'day. Describes severe mid back pain and left hip pain, no numbness, weakness, saddle anesthesia, b/b symptoms. Pain worsened with movement, axial loading. CT TL spine w/ acute nondisplaced fracture of T12 vertebral body extending into right pedicle w/o clear cord impingement.    24 hour Events:     9/29 s/p T9-L3 fusion. Pt had torn dura from injury. Placed on PCA pump.      Allergies    penicillins (Urticaria)  Dilantin (Urticaria)    Intolerances    erythromycin (Stomach Upset; Vomiting; Nausea)      REVIEW OF SYSTEMS: [ ] Unable to Assess due to neurologic exam   [ ] All ROS addressed below are non-contributory, except:  Neuro: [ ] Headache [ ] Back pain [ ] Numbness [ ] Weakness [ ] Ataxia [ ] Dizziness [ ] Aphasia [ ] Dysarthria [ ] Visual disturbance  Resp: [ ] Shortness of breath/dyspnea, [ ] Orthopnea [ ] Cough  CV: [ ] Chest pain [ ] Palpitation [ ] Lightheadedness [ ] Syncope  Renal: [ ] Thirst [ ] Edema  GI: [ ] Nausea [ ] Emesis [ ] Abdominal pain [ ] Constipation [ ] Diarrhea  Hem: [ ] Hematemesis [ ] bright red blood per rectum  ID: [ ] Fever [ ] Chills [ ] Dysuria  ENT: [ ] Rhinorrhea      DEVICES:   [ ] Restraints [ ] ET tube [ ] central line [ ] arterial line [ ] dumont [ ] NGT/OGT [ ] EVD [ ] LD [ ] RADHA/HMV [ ] Trach [ ] PEG [ ] Chest Tube     VITALS:   Vital Signs Last 24 Hrs  T(C): 36.9 (30 Sep 2024 15:00), Max: 37.4 (30 Sep 2024 07:00)  T(F): 98.4 (30 Sep 2024 15:00), Max: 99.4 (30 Sep 2024 07:00)  HR: 83 (30 Sep 2024 18:45) (59 - 100)  BP: 142/64 (30 Sep 2024 18:45) (97/55 - 166/72)  BP(mean): 92 (30 Sep 2024 18:45) (71 - 114)  RR: 19 (30 Sep 2024 18:45) (10 - 26)  SpO2: 92% (30 Sep 2024 18:45) (86% - 100%)    Parameters below as of 30 Sep 2024 15:00  Patient On (Oxygen Delivery Method): room air      CAPILLARY BLOOD GLUCOSE        I&O's Summary    29 Sep 2024 07:01  -  30 Sep 2024 07:00  --------------------------------------------------------  IN: 3036.4 mL / OUT: 2040 mL / NET: 996.4 mL    30 Sep 2024 07:01  -  30 Sep 2024 19:15  --------------------------------------------------------  IN: 1394.4 mL / OUT: 1900 mL / NET: -505.6 mL        Respiratory:    ABG - ( 29 Sep 2024 16:20 )  pH, Arterial: 7.32  pH, Blood: x     /  pCO2: 41    /  pO2: 143   / HCO3: 21    / Base Excess: -4.7  /  SaO2: 98.8                LABS:                        9.9    13.67 )-----------( 223      ( 30 Sep 2024 11:53 )             29.6     09-30    136  |  102  |  12  ----------------------------<  109[H]  3.0[L]   |  24  |  0.69             MEDICATION LEVELS:     IVF FLUIDS/MEDICATIONS:   MEDICATIONS  (STANDING):  acetaminophen     Tablet .. 1000 milliGRAM(s) Oral every 8 hours  methocarbamol 500 milliGRAM(s) Oral every 8 hours  midodrine 5 milliGRAM(s) Oral every 8 hours  multivitamin 1 Tablet(s) Oral daily  phenylephrine    Infusion 0.1 MICROgram(s)/kG/Min (3.32 mL/Hr) IV Continuous <Continuous>  polyethylene glycol 3350 17 Gram(s) Oral every 24 hours  pregabalin 50 milliGRAM(s) Oral three times a day  senna 2 Tablet(s) Oral at bedtime  sodium chloride 0.9%. 1000 milliLiter(s) (70 mL/Hr) IV Continuous <Continuous>    MEDICATIONS  (PRN):  bisacodyl 5 milliGRAM(s) Oral every 12 hours PRN Constipation  HYDROmorphone  Injectable 0.5 milliGRAM(s) IV Push every 3 hours PRN breakthru pain  magnesium hydroxide Suspension 30 milliLiter(s) Oral every 12 hours PRN Constipation  ondansetron   Disintegrating Tablet 4 milliGRAM(s) Oral every 6 hours PRN Nausea and/or Vomiting  ondansetron Injectable 4 milliGRAM(s) IV Push every 6 hours PRN Nausea  oxyCODONE    IR 10 milliGRAM(s) Oral every 4 hours PRN Severe Pain (7 - 10)  oxyCODONE    IR 5 milliGRAM(s) Oral every 4 hours PRN Moderate Pain (4 - 6)        IMAGING:   < from: [facility] Duplex Lower Ext Vein Scan, Bilat (09.30.24 @ 16:57) >  IMPRESSION:  No evidence of deep venous thrombosis in either lower extremity.      --- End of Report ---    MAGEN RODRIGUEZ MD; Attending Radiologist  This document has been electronically signed. Sep 30 2024  5:02PM    < end of copied text >        EXAMINATION:  PHYSICAL EXAM:    Constitutional: No Acute Distress     Neurological: Awake, alert oriented to person, place and time, Following Commands, PERRL, EOMI, No Gaze Preference, RIVERS 5/5                                                 Pulmonary: Clear to Auscultation, No rales, No rhonchi, No wheezes     Cardiovascular: S1, S2, Regular rate and rhythm     Gastrointestinal: Soft, Non-tender, Non-distended     Extremities: No calf tenderness

## 2024-09-30 NOTE — PROGRESS NOTE ADULT - ASSESSMENT
ASSESSMENT: HPI:  74F on celecoxib for OA last took y’day otherwise no AC/AP h/o HTN, gastroparesis, peripheral neuropathy, multiple prior thoracolumbar spine surgeries for congenital scoliosis done >[place] years ago [place] of state w/ removal of hardware 1991 presents with back pain s/p mechanical fall from sitting y'day. Describes severe mid back pain and left hip pain, no numbness, weakness, saddle anesthesia, b/b symptoms. Pain worsened with movement, axial loading. CT TL spine w/ acute nondisplaced fracture of T12 vertebral body extending into right pedicle w/o clear cord impingement. (28 Sep 2024 15:45)      NEURO:  #T12 fracture  POD 0 s/p T9-L3 open fusion w/ PSO/lag screw partial corpectomy/interbody  Neuro Q1H  MAP goals >85   #Pain control with PCA pump  HOB <30  continue lyrica and robaxin  PT/OT    CV:  maintain MAP> 85 for spinal cord perfusion   Wean off cullen  home losartan held for MAP goals     PULM:  Incentive spirometry  on room air   sat >92%     RENAL:  IVF while NPO and iso PCA pump  NS @ 70cc   monitor i/os   Replete lytes PRN    GI:  h/o GERD   Diet: Advanced as tolerated  GI prophylaxis [] not indicated [] PPI [x] other: pepcid, home medication   Bowel regimen [] colace [x] senna [x] other: senna, miralax, dulcolax   zofran     ENDO:   Goal euglycemia (-180)    HEME/ONC:  VTE prophylaxis: [x] SCDs [] chemoprophylaxis [x] hold chemoprophylaxis due to: preop in AM [] high risk of DVT/PE on admission due to:  LED -p     ID:  afebrile   monitor fever curve     CODE STATUS:  [x] Full Code [] DNR [] DNI [] Palliative/Comfort Care    DISPOSITION:  [x] ICU [] Stroke Unit [] Floor [] EMU [] RCU [] PCU     ASSESSMENT: HPI:  74F on celecoxib for OA last took y’day otherwise no AC/AP h/o HTN, gastroparesis, peripheral neuropathy, multiple prior thoracolumbar spine surgeries for congenital scoliosis done >10 years ago out of state w/ removal of hardware 1991 presents with back pain s/p mechanical fall from sitting y'day. Describes severe mid back pain and left hip pain, no numbness, weakness, saddle anesthesia, b/b symptoms. Pain worsened with movement, axial loading. CT TL spine w/ acute nondisplaced fracture of T12 vertebral body extending into right pedicle w/o clear cord impingement. (28 Sep 2024 15:45)      NEURO:  #T12 fracture  POD 0 s/p T9-L3 open fusion w/ PSO/lag screw partial corpectomy/interbody  Neuro Q4H  MAP goals >85   #Pain control with PCA pump  HOB <30  continue lyrica and robaxin  PT/OT    CV:  maintain MAP> 85 for spinal cord perfusion   Wean off cullen, start midodrine 5 Q8  home losartan held for MAP goals     PULM:  Incentive spirometry  RA  sat >97%     RENAL:  NS 70 ccs/hour   monitor i/os   Replete lytes PRN    GI:  h/o GERD   Diet: Regular diet  GI prophylaxis [] not indicated [] PPI [x] other: pepcid, home medication   Bowel regimen [] colace [x] senna [x] other: senna, miralax, dulcolax   zofran     ENDO:   Goal euglycemia (-180)    HEME/ONC:  VTE prophylaxis: [x] SCDs [] chemoprophylaxis [x] hold chemoprophylaxis due to: preop in AM [] high risk of DVT/PE on admission due to:  LED -p     ID:  afebrile   monitor fever curve     CODE STATUS:  [x] Full Code [] DNR [] DNI [] Palliative/Comfort Care    DISPOSITION:  [x] ICU [] Stroke Unit [] Floor [] EMU [] RCU [] PCU

## 2024-09-30 NOTE — CHART NOTE - NSCHARTNOTEFT_GEN_A_CORE
Tertiary Trauma Survey (TTS)    Date of TTS:          9/30/24                    Time: 5:54PM  Admit Date:         9/28/24                           HPI:  74F on celecoxib for OA last took y’day otherwise no AC/AP h/o HTN, gastroparesis, peripheral neuropathy, multiple prior thoracolumbar spine surgeries for congenital scoliosis [place] >[place] state w/ removal of hardware 1991 presents with back pain s/p mechanical fall from sitting y'day. Describes severe mid back pain and left hip pain, no numbness, weakness, saddle anesthesia, b/b symptoms. Pain worsened with movement, axial loading. CT TL spine w/ acute nondisplaced fracture of T12 veterbral body extending into right pedicle w/o clear cord impingement. (28 Sep 2024 15:45)      PAST MEDICAL & SURGICAL HISTORY:  H/O seasonal allergies      History of pulmonary embolism  developed after billings abigail surgery,1984 treated with coumadin 3 months, no issues after      Restless leg syndrome      GERD (gastroesophageal reflux disease)      OA (osteoarthritis)      Fungal infection of skin  under breast      H/O scoliosis      Essential hypertension      Depression      Osteoporosis      Right hip pain      2019 novel coronavirus disease (COVID-19)  Dec 2020- hospitalized for 3 days, did not require home O2, denies residual symptoms      Hiatal hernia      History of chronic pain      Peripheral neuropathy      S/P repair of paraesophageal hernia  2001      S/P spinal fusion  L4-L5 1966      Scoliosis  s/p placement of billings abigail x 2 1984      S/P spinal surgery  x 2 1985, 1986      Removal of pin, plate, abigail, or screw  1991- removal of billings abigail      S/P hysterectomy  1982      S/P hip replacement  left (2008)      S/P shoulder surgery  right (2012)      S/P dilatation and curettage  1981      H/O arthroscopy of knee  June 2021      S/P cataract surgery      History of bilateral hip replacements        [  ] No significant past history as reviewed with the patient and family    PRIMARY SURVEY:  A - airway intact  B - bilateral equal chest rise, no increased WOB  C - palpable pulses in all extremities;   D - GCS   E - exposure obtained    SECONDARY SURVEY:   GENERAL: Uncomfortable 2/2 pain  HENMT: Atraumatic, moist mucous membranes  EYES: Clear bilaterally, EOMs intact b/l  HEART: Regular rate and regular rhythm  RESPIRATORY: Clear to auscultation bilaterally, no wheezes/rhonchi/rales  ABDOMEN: Soft, nontender, nondistended  MSK: B/l lumbar paraspinal ttp, no chest wall ttp, pelvis stable  EXTREMITIES: B/l upper and lower extremities with normal ROM and no ttp, no lower extremity edema  NEURO: Alert, follows commands, no focal motor deficits or sensory deficits   SKIN: Skin normal color for race, warm, dry     TERTIARY SURVEY:   GEN: resting comfortably in bed, in NAD  HEENT: normocephalic, non-tender to palpation, no abrasions visible, no step-offs palpated  NECK: no JVD, non-tender to palpation at posterior midline, no pain with flexion, extension, and bilateral neck rotation  CHEST: non-tender to palpation across clavicles and b/l anterior ribs  BACK: non-tender to palpation along cervical, thoracic, spine midline and b/l posterior ribs; no palpable step-offs or hematomas. Tender to palpation focally at the lumbar spine  ABD: soft, non-distended, non-tender to palpation in all quadrants without rebound tenderness or guarding  LUE: non-tender to palpation across upper and lower arm, 5/5  strength, fingers warm, well-perfused, full ROM in shoulder, elbow, wrist, and fingers, palpable radial + ulnar pulses  RUE: non-tender to palpation across upper and lower arm, 5/5  strength, fingers warm, well-perfused, full ROM in shoulder, elbow, wrist, and fingers, palpable radial + ulnar pulses  LLE: non-tender to palpation across lower leg; full ROM in hip, knee, ankle, and toes, 5/5 dorsiflexion + plantarflexion, palpable DP + PT pulses; warm, well-perfused. Tenderness by the greater trochanter  RLE: non-tender to palpation at lower leg; full ROM in hip, knee, ankle, and toes, 5/5 dorsiflexion + plantarflexion, palpable DP + PT pulses; warm, well-perfused. Tenderness to palpation at the greater trochanter  NEURO: AAOx4, no focal neuro deficits; CN II-IX intact     Medications (inpatient): acetaminophen     Tablet .. 1000 milliGRAM(s) Oral every 8 hours  methocarbamol 500 milliGRAM(s) Oral every 8 hours  midodrine 5 milliGRAM(s) Oral every 8 hours  multivitamin 1 Tablet(s) Oral daily  phenylephrine    Infusion 0.1 MICROgram(s)/kG/Min IV Continuous <Continuous>  polyethylene glycol 3350 17 Gram(s) Oral every 24 hours  pregabalin 50 milliGRAM(s) Oral three times a day  senna 2 Tablet(s) Oral at bedtime  sodium chloride 0.9%. 1000 milliLiter(s) IV Continuous <Continuous>    Medications (PRN):bisacodyl 5 milliGRAM(s) Oral every 12 hours PRN  HYDROmorphone  Injectable 0.5 milliGRAM(s) IV Push every 3 hours PRN  magnesium hydroxide Suspension 30 milliLiter(s) Oral every 12 hours PRN  ondansetron   Disintegrating Tablet 4 milliGRAM(s) Oral every 6 hours PRN  ondansetron Injectable 4 milliGRAM(s) IV Push every 6 hours PRN  oxyCODONE    IR 10 milliGRAM(s) Oral every 4 hours PRN  oxyCODONE    IR 5 milliGRAM(s) Oral every 4 hours PRN    Allergies: penicillins (Urticaria)  Dilantin (Urticaria)  (Intolerances: erythromycin (Stomach Upset; Vomiting; Nausea)  )    Vital Signs Last 24 Hrs  T(C): 36.9 (30 Sep 2024 15:00), Max: 37.4 (30 Sep 2024 07:00)  T(F): 98.4 (30 Sep 2024 15:00), Max: 99.4 (30 Sep 2024 07:00)  HR: 95 (30 Sep 2024 17:45) (59 - 100)  BP: 109/59 (30 Sep 2024 17:45) (97/55 - 166/72)  BP(mean): 79 (30 Sep 2024 17:45) (71 - 114)  RR: 20 (30 Sep 2024 17:45) (10 - 26)  SpO2: 94% (30 Sep 2024 17:45) (86% - 100%)    Parameters below as of 30 Sep 2024 15:00  Patient On (Oxygen Delivery Method): room air      Drug Dosing Weight  Height (cm): 152.4 (29 Sep 2024 08:54)  Weight (kg): 88.5 (29 Sep 2024 08:54)  BMI (kg/m2): 38.1 (29 Sep 2024 08:54)  BSA (m2): 1.85 (29 Sep 2024 08:54)                          9.9    13.67 )-----------( 223      ( 30 Sep 2024 11:53 )             29.6     09-30    136  |  102  |  12  ----------------------------<  109[H]  3.0[L]   |  24  |  0.69    Ca    8.2[L]      30 Sep 2024 11:53  Phos  1.6     09-30  Mg     1.9     09-30        Urinalysis Basic - ( 30 Sep 2024 11:53 )    Color: x / Appearance: x / SG: x / pH: x  Gluc: 109 mg/dL / Ketone: x  / Bili: x / Urobili: x   Blood: x / Protein: x / Nitrite: x   Leuk Esterase: x / RBC: x / WBC x   Sq Epi: x / Non Sq Epi: x / Bacteria: x        List Operative and Interventional Radiological Procedures:     Consults (Date):  [ x ] Neurosurgery 9/28  [ x ] NSICU 9/28  [  ] Orthopedics   [ x ] Plastics  [  ] Urology  [  ] PM&R  [  ] Social Work    RADIOLOGICAL FINDINGS REVIEW:  CXR:    Pelvis Films:     C-Spine Films:    T/L/S Spine Films:    Extremity Films:    Head CT:    C-Spine CT:    Neck CT:    Chest CT:    ABD/Pelvis CT:    Other:    ASSESSMENT:   75yo Female with Hx     PLAN:       Known Injuries: Tertiary Trauma Survey (TTS)    Date of TTS:          9/30/24                    Time: 5:54PM  Admit Date:         9/28/24                           HPI:  74F on celecoxib for OA last took y’day otherwise no AC/AP h/o HTN, gastroparesis, peripheral neuropathy, multiple prior thoracolumbar spine surgeries for congenital scoliosis done >[place] years [place] of state w/ removal of hardware 1991 presents with back pain s/p mechanical fall from sitting y'day. Describes severe mid back pain and left hip pain, no numbness, weakness, saddle anesthesia, b/b symptoms. Pain worsened with movement, axial loading. CT TL spine w/ acute nondisplaced fracture of T12 veterbral body extending into right pedicle w/o clear cord impingement. (28 Sep 2024 15:45)      PAST MEDICAL & SURGICAL HISTORY:  H/O seasonal allergies      History of pulmonary embolism  developed after billings abigail surgery,1984 treated with coumadin 3 months, no issues after      Restless leg syndrome      GERD (gastroesophageal reflux disease)      OA (osteoarthritis)      Fungal infection of skin  under breast      H/O scoliosis      Essential hypertension      Depression      Osteoporosis      Right hip pain      2019 novel coronavirus disease (COVID-19)  Dec 2020- hospitalized for 3 days, did not require home O2, denies residual symptoms      Hiatal hernia      History of chronic pain      Peripheral neuropathy      S/P repair of paraesophageal hernia  2001      S/P spinal fusion  L4-L5 1966      Scoliosis  s/p placement of billings abigail x 2 1984      S/P spinal surgery  x 2 1985, 1986      Removal of pin, plate, abigail, or screw  1991- removal of billings abigail      S/P hysterectomy  1982      S/P hip replacement  left (2008)      S/P shoulder surgery  right (2012)      S/P dilatation and curettage  1981      H/O arthroscopy of knee  June 2021      S/P cataract surgery      History of bilateral hip replacements        [  ] No significant past history as reviewed with the patient and family    PRIMARY SURVEY:  A - airway intact  B - bilateral equal chest rise, no increased WOB  C - palpable pulses in all extremities;   D - GCS   E - exposure obtained    SECONDARY SURVEY:   GENERAL: Uncomfortable 2/2 pain  HENMT: Atraumatic, moist mucous membranes  EYES: Clear bilaterally, EOMs intact b/l  HEART: Regular rate and regular rhythm  RESPIRATORY: Clear to auscultation bilaterally, no wheezes/rhonchi/rales  ABDOMEN: Soft, nontender, nondistended  MSK: B/l lumbar paraspinal ttp, no chest wall ttp, pelvis stable  EXTREMITIES: B/l upper and lower extremities with normal ROM and no ttp, no lower extremity edema  NEURO: Alert, follows commands, no focal motor deficits or sensory deficits   SKIN: Skin normal color for race, warm, dry     TERTIARY SURVEY:   GEN: resting comfortably in bed, in NAD  HEENT: normocephalic, non-tender to palpation, no abrasions visible, no step-offs palpated  NECK: no JVD, non-tender to palpation at posterior midline, no pain with flexion, extension, and bilateral neck rotation  CHEST: non-tender to palpation across clavicles and b/l anterior ribs  BACK: non-tender to palpation along cervical, thoracic, spine midline and b/l posterior ribs; no palpable step-offs or hematomas. Tender to palpation focally at the lumbar spine  ABD: soft, non-distended, non-tender to palpation in all quadrants without rebound tenderness or guarding  LUE: non-tender to palpation across upper and lower arm, 5/5  strength, fingers warm, well-perfused, full ROM in shoulder, elbow, wrist, and fingers, palpable radial + ulnar pulses  RUE: non-tender to palpation across upper and lower arm, 5/5  strength, fingers warm, well-perfused, full ROM in shoulder, elbow, wrist, and fingers, palpable radial + ulnar pulses  LLE: non-tender to palpation across lower leg; full ROM in hip, knee, ankle, and toes, 5/5 dorsiflexion + plantarflexion, palpable DP + PT pulses; warm, well-perfused. Tenderness by the greater trochanter  RLE: non-tender to palpation at lower leg; full ROM in hip, knee, ankle, and toes, 5/5 dorsiflexion + plantarflexion, palpable DP + PT pulses; warm, well-perfused. Tenderness to palpation at the greater trochanter  NEURO: AAOx4, no focal neuro deficits; CN II-IX intact     Medications (inpatient): acetaminophen     Tablet .. 1000 milliGRAM(s) Oral every 8 hours  methocarbamol 500 milliGRAM(s) Oral every 8 hours  midodrine 5 milliGRAM(s) Oral every 8 hours  multivitamin 1 Tablet(s) Oral daily  phenylephrine    Infusion 0.1 MICROgram(s)/kG/Min IV Continuous <Continuous>  polyethylene glycol 3350 17 Gram(s) Oral every 24 hours  pregabalin 50 milliGRAM(s) Oral three times a day  senna 2 Tablet(s) Oral at bedtime  sodium chloride 0.9%. 1000 milliLiter(s) IV Continuous <Continuous>    Medications (PRN):bisacodyl 5 milliGRAM(s) Oral every 12 hours PRN  HYDROmorphone  Injectable 0.5 milliGRAM(s) IV Push every 3 hours PRN  magnesium hydroxide Suspension 30 milliLiter(s) Oral every 12 hours PRN  ondansetron   Disintegrating Tablet 4 milliGRAM(s) Oral every 6 hours PRN  ondansetron Injectable 4 milliGRAM(s) IV Push every 6 hours PRN  oxyCODONE    IR 10 milliGRAM(s) Oral every 4 hours PRN  oxyCODONE    IR 5 milliGRAM(s) Oral every 4 hours PRN    Allergies: penicillins (Urticaria)  Dilantin (Urticaria)  (Intolerances: erythromycin (Stomach Upset; Vomiting; Nausea)  )    Vital Signs Last 24 Hrs  T(C): 36.9 (30 Sep 2024 15:00), Max: 37.4 (30 Sep 2024 07:00)  T(F): 98.4 (30 Sep 2024 15:00), Max: 99.4 (30 Sep 2024 07:00)  HR: 95 (30 Sep 2024 17:45) (59 - 100)  BP: 109/59 (30 Sep 2024 17:45) (97/55 - 166/72)  BP(mean): 79 (30 Sep 2024 17:45) (71 - 114)  RR: 20 (30 Sep 2024 17:45) (10 - 26)  SpO2: 94% (30 Sep 2024 17:45) (86% - 100%)    Parameters below as of 30 Sep 2024 15:00  Patient On (Oxygen Delivery Method): room air      Drug Dosing Weight  Height (cm): 152.4 (29 Sep 2024 08:54)  Weight (kg): 88.5 (29 Sep 2024 08:54)  BMI (kg/m2): 38.1 (29 Sep 2024 08:54)  BSA (m2): 1.85 (29 Sep 2024 08:54)                          9.9    13.67 )-----------( 223      ( 30 Sep 2024 11:53 )             29.6     09-30    136  |  102  |  12  ----------------------------<  109[H]  3.0[L]   |  24  |  0.69    Ca    8.2[L]      30 Sep 2024 11:53  Phos  1.6     09-30  Mg     1.9     09-30        Urinalysis Basic - ( 30 Sep 2024 11:53 )    Color: x / Appearance: x / SG: x / pH: x  Gluc: 109 mg/dL / Ketone: x  / Bili: x / Urobili: x   Blood: x / Protein: x / Nitrite: x   Leuk Esterase: x / RBC: x / WBC x   Sq Epi: x / Non Sq Epi: x / Bacteria: x        List Operative and Interventional Radiological Procedures:     Consults (Date):  [ x ] Neurosurgery 9/28  [ x ] NSICU 9/28  [  ] Orthopedics   [ x ] Plastics  [  ] Urology  [  ] PM&R  [  ] Social Work    RADIOLOGICAL FINDINGS REVIEW:  CXR:  9/28  The heart is not accurately assessed in this AP projection.  No focal consolidation.  There is no pleural effusion.  There is no pneumothorax.  No acute bony abnormality.Right shoulder arthroplasty.    IMPRESSION:  No focal consolidation.    9/29    FINDINGS:  The heart is not accurately assessed on this AP projection.  No focal consolidation. No pleural effusion or pneumothorax.  Right shoulder arthroplasty. Is not accurately assessed onthis AP   projection.  Left eighth rib fracture is better assessed on chest CT scan.    IMPRESSION:    Left 8th rib fracture better assessed on chest CT scan.  No focal consolidations.      --- End of Report ---    Pelvis Films:     FINDINGS:  Pelvis:  Status post bilateral hip arthroplasties. The distal portion of the   femoral stems are not visualized limiting evaluation. There is osseous   fusion throughout the visualized lumbar spine. There is marked left   sacroiliitis with erosions and marked adjacent sclerosis. There is also   spurring and sclerosis around the right sacroiliac joint. There is   moderate pubic symphysis arthrosis.    Lumbar spine:  There is thoracolumbar osseous fusion. There is a fracture at the   superior endplate of T12 is better visualized on the recent CT scan to   represent a 3 column unstable fracture. Surgical clips are noted.    IMPRESSION:  Fracture at the superior endplate of T12 which is better visualized on   the recent CT scan represent a 3 column unstable fracture.    This was discussed by Dr. Simpson with Dr. Ricks on 9/28/2024 at 6:25 AM.    --- End of Report ---    Xray Lumbar Spine  9/28  FINDINGS:  Pelvis:  Status post bilateral hip arthroplasties. The distal portion of the   femoral stems are not visualized limiting evaluation. There is osseous   fusion throughout the visualized lumbar spine. There is marked left   sacroiliitis with erosions and marked adjacent sclerosis. There is also   spurring and sclerosis around the right sacroiliac joint. There is   moderate pubic symphysis arthrosis.    Lumbar spine:  There is thoracolumbar osseous fusion. There is a fracture at the   superior endplate of T12 is better visualized on the recent CT scan to   represent a 3 column unstable fracture. Surgical clips are noted.    IMPRESSION:  Fracture at the superior endplate of T12 which is better visualized on   the recent CT scan represent a 3 column unstable fracture.    This was discussed by Dr. Simpson with Dr. Ricks on 9/28/2024 at 6:25 AM.    --- End of Report ---    C-Spine Films:    T/L/S Spine Films:    Extremity Films:    Head CT:  9/28  FINDINGS:    Head CT: There is no obvious acute intracranial hemorrhage, mass effect   or midline shift. Nonspecific mild periventricular and subcortical white   matter hypodensities likely represent microvascular ischemic changes.   There is stable cerebral volume loss with ventricular dilatation.    There is no depressed skull fracture. There is sinus mucosal thickening.   Visualized tympanomastoid region is unremarkable.    Cervical spine CT: Decreased bone mineralization. Straightening of the   cervical lordosis may be related to patient positioning and/or muscle   spasm. There is no obvious acute fracture or traumatic malalignment in   the cervical spine. The craniocervical junction is preserved. Minimal  asymmetry of the lateral atlantodental interval is again noted. Minimal   retrolisthesis of C3 on C4 and C4 on C5. Stable mild endplate depression   of the visualized upper thoracic spine.    There are multilevel degenerative changes characterized by uncovertebral   degenerative change, disc osteophyte complex and facet arthropathy in the   cervical spine with resultant neural foraminal and spinal canal stenosis.    There is no significant prevertebral soft tissue edema. Evaluation of the   spinal canal content is limited on this study.    Visualized upper chest: No pneumothorax. Nonspecific small sclerotic   focus at the right first rib. Air/debris filled visualized upper thoracic   esophagus.    IMPRESSION:    Head CT: No obvious acute intracranial hemorrhage or displaced skull   fracture. If clinically indicated, short-term follow-up or MRI may be   obtained for further evaluation.    Cervical spine CT: No obvious acute fracture or traumatic malalignment in   the cervical spine. If there is clinical suspicion for acute fracture or   ligamentous/cord injury, MRI may be obtained for further evaluation.    Discussed major findings with Dr. Ricks on 9/28/2024 6:25 AM.    --- End of Report ---    C-Spine CT:    Neck CT:    FINDINGS: Evaluation of the thoracic, abdominal and pelvic organs and   vasculature is limited without intravenous contrast. Patient's   respiratory motion degrades images.    CHEST:    CT Chest, abdomen, pelvis    LUNGS AND AIRWAYS: Patent central airways. Stable tiny focal outpouching   of air at the right proximal mainstem bronchus near the royal.   Atelectasis. Right lower lobe opacity posteriorly.  PLEURA: Suggest trace right pleural effusion.  HEART: Stable heart size. Trace pericardial effusion. Coronary artery   calcification.  VESSELS: Atherosclerosis. Normal caliber of the thoracic aorta.  MEDIASTINUM AND PILAR: Subcentimeter lymph nodes.  CHEST WALL AND LOWER NECK: Somewhat nodular appearance of the right   breast soft tissue again noted.    ABDOMEN/PELVIS:    LIVER:Stable cyst.  BILE DUCTS/GALLBLADDER: No intrahepatic or extrahepatic biliary   dilatation. No obvious radiopaque gallstone.  PANCREAS: Unremarkable.  SPLEEN: Unremarkable.    ADRENALS: Unremarkable.  KIDNEYS/URETERS: Bilateral perinephric stranding without   hydroureteronephrosis.  BLADDER: Distended.  REPRODUCTIVE ORGANS: Hysterectomy.    BOWEL: Air/debris/fluid in the visualized esophagus. Small hiatal hernia.   No bowel obstruction. Unremarkable appendix. Colon diverticulosis.   Underdistended left colon.  PERITONEUM: No organized fluid collection or free air.  VESSELS: Atherosclerosis. Normal caliber of the abdominal aorta.  RETROPERITONEUM/LYMPH NODE: No lymphadenopathy. Paraspinal stranding.  ABDOMINAL WALL/SOFT TISSUES: Abdominal walldiastasis. Small   fat-containing umbilical hernia. Heterogenous spinal canal at the level   of the fracture, which is difficult to assess due to artifact.    BONES: Decreased bone mineralization. Acute fracture involving the   anterior to posterior elements of T12 with fracture gap of   1.2 cm in cc dimension. Fracture extending to the left T12-L1 facet joint   and right 12th posterior rib near the costovertebral junction. Mild   anterior displacement of the inferior aspect of the T12 vertebral body   with respect to the T12 superior endplate. Multilevel facet/interspinous   and vertebral body fusion and degenerative changes again noted. Ghost   tracts in bilateral sacrum. Widened sacroiliac joints again noted.    Acute, minimally displaced fractures of the left eighth lateral rib.   Postsurgical changes of the left 10th rib. Chronic deformities of   bilateral fourth through ninth lateral ribs. Bilateral total hip   arthroplasty with incompletely visualized femoral component. Right   shoulder arthroplasty. Hardware artifact degrading images.    IMPRESSION:    Evaluation of the thoracic, abdominal and pelvic organs and vasculature   is limited without intravenous contrast.    T12 three column spine fracture-malalignment with fracture extending to   the adjacent right 12th posterior rib and left T12-L1 facet joint.   Heterogenous spinal canal at the level of the fracture, which is   difficult to assess due to artifact. Recommend MR to assess for and/or   soft tissue/ligamentous-cord injury.    Acute, displaced fracture of the left eighth lateral rib. Chronic   appearing bilateral rib deformities. No pneumothorax. Dependent right   lower lobe opacity, which may be due to atelectasis although pulmonary   contusion is not excluded given adjacent injury.    Additional findings as described.    Discussed major findings with Dr. Ricks on 9/28/2024 6:25 AM.    Xray spine single view 9/29    IMPRESSION:  Status post fixation of the thoracolumbar spine with pedicle screws and   interconnecting rods. A fully threaded screw overlies the fractured T12   level as well as a cage device. Surgical clips overlie the lumbar spine.   Additional wires overlie the soft tissues.    --- End of Report ---    Other:    ASSESSMENT:   73yo Female with Hx of OA, HTN, gastroparesis, peripheral neuropathy, multiple prior thoracolumbar spine surgeries for congential scoliosis over 10 years ago presents with back pain and vomiting after a fall from sitting.    PLAN:   Patient has some bilateral tenderness in the hips since the fall. Wound recommend dedicated hip films if suspicion of fracture in the area remains high.    Known Injuries:  - T12 three column spine fracture-malalignment with fracture extending to   the adjacent right 12th posterior rib and left T12-L1 facet joint  - Acute, displaced fracture of the left eighth lateral rib  - right lower lobe opacity, likely pulmonary contusion

## 2024-09-30 NOTE — PROGRESS NOTE ADULT - SUBJECTIVE AND OBJECTIVE BOX
Day 2\1 of Anesthesia Pain Management Service    SUBJECTIVE: I'm having some pain    Pain Scale Score:	[X] Refer to charted pain scores    THERAPY:    [ ] IV PCA Morphine		[ ] 5 mg/mL	[ ] 1 mg/mL  [X] IV PCA Hydromorphone	[ ] 5 mg/mL	[X] 1 mg/mL  [ ] IV PCA Fentanyl		[ ] 50 micrograms/mL    Demand dose: 0.2 mg     Lockout: 6 minutes   Continuous Rate: 0 mg/hr  4 Hour Limit: 4 mg    MEDICATIONS  (STANDING):  acetaminophen     Tablet .. 1000 milliGRAM(s) Oral every 8 hours  HYDROmorphone PCA (1 mG/mL) 30 milliLiter(s) PCA Continuous PCA Continuous  methocarbamol 500 milliGRAM(s) Oral every 8 hours  multivitamin 1 Tablet(s) Oral daily  phenylephrine    Infusion 0.1 MICROgram(s)/kG/Min (3.32 mL/Hr) IV Continuous <Continuous>  polyethylene glycol 3350 17 Gram(s) Oral every 24 hours  pregabalin 50 milliGRAM(s) Oral three times a day  senna 2 Tablet(s) Oral at bedtime  sodium chloride 0.9%. 1000 milliLiter(s) (70 mL/Hr) IV Continuous <Continuous>    MEDICATIONS  (PRN):  bisacodyl 5 milliGRAM(s) Oral every 12 hours PRN Constipation  HYDROmorphone PCA (1 mG/mL) Rescue Clinician Bolus 0.5 milliGRAM(s) IV Push every 15 minutes PRN for Pain Scale GREATER THAN 6  magnesium hydroxide Suspension 30 milliLiter(s) Oral every 12 hours PRN Constipation  naloxone Injectable 0.1 milliGRAM(s) IV Push every 3 minutes PRN For ANY of the following changes in patient status:  A. RR LESS THAN 10 breaths per minute, B. Oxygen saturation LESS THAN 90%, C. Sedation score of 6  ondansetron   Disintegrating Tablet 4 milliGRAM(s) Oral every 6 hours PRN Nausea and/or Vomiting  ondansetron Injectable 4 milliGRAM(s) IV Push every 6 hours PRN Nausea      OBJECTIVE:    Sedation Score:	[ X] Alert 	[ ] Drowsy 	[ ] Arousable	[ ] Asleep	[ ] Unresponsive    Side Effects:	[X ] None	[ ] Nausea	[ ] Vomiting	[ ] Pruritus  		[ ] Other:    Vital Signs Last 24 Hrs  T(C): 37.4 (30 Sep 2024 07:00), Max: 37.4 (30 Sep 2024 07:00)  T(F): 99.4 (30 Sep 2024 07:00), Max: 99.4 (30 Sep 2024 07:00)  HR: 92 (30 Sep 2024 09:45) (59 - 92)  BP: --  BP(mean): --  RR: 20 (30 Sep 2024 09:45) (12 - 26)  SpO2: 100% (30 Sep 2024 09:45) (86% - 100%)    Parameters below as of 30 Sep 2024 09:00    O2 Flow (L/min): 2      ASSESSMENT/ PLAN    Therapy to  be:               [X] Continued   [ ] Discontinued   [ ] Changed to PRN Analgesics    Documentation and Verification of current medications:   [X] Done	[ ] Not done, not eligible    Comments: Endorsing some incisional pain with movement, requesting to get OOB. Total PCA use 8.9mg / 24 hours.  Recommend adding acetaminophen iv x 24 hours.

## 2024-09-30 NOTE — PHYSICAL THERAPY INITIAL EVALUATION ADULT - GENERAL OBSERVATIONS, REHAB EVAL
Pt. rec' d in bed, NAD, +IVL, +Olga, +Blake, +ICU monitoring, RADHA drain x 2, family at the bedside, agreeable to PT rama.

## 2024-09-30 NOTE — PROGRESS NOTE ADULT - SUBJECTIVE AND OBJECTIVE BOX
Plastic Surgery Progress Note      Subjective/recent events:     No acute events overnight; now s/p T9-L3 open fusion by spine surg with paraspinal muscle flaps for back closure by plastics. postoperative pain well controlled with PCA at this time. Patient able to get up from chair with PT; doing well.  ?  Exam/pictures:    Gen: NAD, lying in bed, AOx3, moving all extremities, no jaundice/scleral icterus  HEENT: Moist mucous membranes  CV: warm extremities, pink and perfused  P: breathing comfortably on room air without cough or wheeze  Abd: +BS, soft, no abdominal distention or tenderness  Ext: no clubbing, cyanosis, or edema  Back: dressing c/d/I, back soft, drains s/s      Assessment and plan:  74 y.o. female Postop from back surgery and back closure in layers including the bilateral paraspinous muscles.     Plan:   -dispo per primary  -continue to monitor drain output (if Ok'ed by spine team, ok to discharge with drains in place)  >Right sided drain is deep and should not be placed under suction  >Left sided drain is ok to have to suction  -Mepilex dressing change before discharge or if soiled, then daily dressing change with gauze or ABD and tape until the patient is 4 weeks out from surgery. Dressing changes to be done by nursing.   - Call my office to schedule follow up after discharge with me; [phone] or [phone]  - please call or text my cell below with any questions or concerns; [phone]

## 2024-09-30 NOTE — PHYSICAL THERAPY INITIAL EVALUATION ADULT - GAIT DEVIATIONS NOTED, PT EVAL
decreased azeem/decreased velocity of limb motion/decreased step length/decreased weight-shifting ability

## 2024-09-30 NOTE — OCCUPATIONAL THERAPY INITIAL EVALUATION ADULT - LIVES WITH, PROFILE
Lives with son in a condo, 0 steps to enter. +Elevator, (I) ADLs and functional transfers with rollator. +. Tub with curtain and stool.

## 2024-09-30 NOTE — PHYSICAL THERAPY INITIAL EVALUATION ADULT - PRECAUTIONS/LIMITATIONS, REHAB EVAL
[Time Spent: ___ minutes] : I have spent [unfilled] minutes of time on the encounter. fall precautions/spinal precautions

## 2024-10-01 ENCOUNTER — RX RENEWAL (OUTPATIENT)
Age: 74
End: 2024-10-01

## 2024-10-01 ENCOUNTER — RESULT REVIEW (OUTPATIENT)
Age: 74
End: 2024-10-01

## 2024-10-01 ENCOUNTER — TRANSCRIPTION ENCOUNTER (OUTPATIENT)
Age: 74
End: 2024-10-01

## 2024-10-01 LAB
HCT VFR BLD CALC: 28.6 % — LOW (ref 34.5–45)
HGB BLD-MCNC: 9.2 G/DL — LOW (ref 11.5–15.5)
MCHC RBC-ENTMCNC: 28.5 PG — SIGNIFICANT CHANGE UP (ref 27–34)
MCHC RBC-ENTMCNC: 32.2 GM/DL — SIGNIFICANT CHANGE UP (ref 32–36)
MCV RBC AUTO: 88.5 FL — SIGNIFICANT CHANGE UP (ref 80–100)
NRBC # BLD: 0 /100 WBCS — SIGNIFICANT CHANGE UP (ref 0–0)
PLATELET # BLD AUTO: 187 K/UL — SIGNIFICANT CHANGE UP (ref 150–400)
RBC # BLD: 3.23 M/UL — LOW (ref 3.8–5.2)
RBC # FLD: 15.3 % — HIGH (ref 10.3–14.5)
WBC # BLD: 9.33 K/UL — SIGNIFICANT CHANGE UP (ref 3.8–10.5)
WBC # FLD AUTO: 9.33 K/UL — SIGNIFICANT CHANGE UP (ref 3.8–10.5)

## 2024-10-01 PROCEDURE — 76376 3D RENDER W/INTRP POSTPROCES: CPT | Mod: 26

## 2024-10-01 PROCEDURE — 93306 TTE W/DOPPLER COMPLETE: CPT | Mod: 26

## 2024-10-01 PROCEDURE — 73521 X-RAY EXAM HIPS BI 2 VIEWS: CPT | Mod: 26

## 2024-10-01 PROCEDURE — 99232 SBSQ HOSP IP/OBS MODERATE 35: CPT

## 2024-10-01 PROCEDURE — 93356 MYOCRD STRAIN IMG SPCKL TRCK: CPT

## 2024-10-01 RX ORDER — ACETAMINOPHEN 325 MG
1000 TABLET ORAL EVERY 8 HOURS
Refills: 0 | Status: DISCONTINUED | OUTPATIENT
Start: 2024-10-01 | End: 2024-10-09

## 2024-10-01 RX ORDER — ENOXAPARIN SODIUM 150 MG/ML
40 INJECTION SUBCUTANEOUS EVERY 24 HOURS
Refills: 0 | Status: DISCONTINUED | OUTPATIENT
Start: 2024-10-01 | End: 2024-10-17

## 2024-10-01 RX ORDER — SODIUM CHLORIDE 0.9 % (FLUSH) 0.9 %
1000 SYRINGE (ML) INJECTION
Refills: 0 | Status: DISCONTINUED | OUTPATIENT
Start: 2024-10-01 | End: 2024-10-02

## 2024-10-01 RX ORDER — MIDODRINE HYDROCHLORIDE 5 MG/1
10 TABLET ORAL EVERY 8 HOURS
Refills: 0 | Status: DISCONTINUED | OUTPATIENT
Start: 2024-10-01 | End: 2024-10-09

## 2024-10-01 RX ORDER — VONOPRAZAN FUMARATE 13.36 MG/1
10 TABLET ORAL DAILY
Qty: 90 | Refills: 1 | Status: ACTIVE | COMMUNITY
Start: 2024-10-01 | End: 1900-01-01

## 2024-10-01 RX ORDER — CHLORHEXIDINE GLUCONATE ORAL RINSE 1.2 MG/ML
1 SOLUTION DENTAL
Refills: 0 | Status: DISCONTINUED | OUTPATIENT
Start: 2024-10-01 | End: 2024-10-06

## 2024-10-01 RX ORDER — OXYCODONE HYDROCHLORIDE 30 MG/1
10 TABLET, FILM COATED, EXTENDED RELEASE ORAL EVERY 4 HOURS
Refills: 0 | Status: DISCONTINUED | OUTPATIENT
Start: 2024-10-01 | End: 2024-10-07

## 2024-10-01 RX ORDER — OXYCODONE HYDROCHLORIDE 30 MG/1
15 TABLET, FILM COATED, EXTENDED RELEASE ORAL EVERY 4 HOURS
Refills: 0 | Status: DISCONTINUED | OUTPATIENT
Start: 2024-10-01 | End: 2024-10-07

## 2024-10-01 RX ADMIN — HYDROMORPHONE HYDROCHLORIDE 0.5 MILLIGRAM(S): 1 INJECTION, SOLUTION INTRAMUSCULAR; INTRAVENOUS; SUBCUTANEOUS at 02:19

## 2024-10-01 RX ADMIN — Medication 30 MILLILITER(S): at 11:07

## 2024-10-01 RX ADMIN — Medication 500 MILLIGRAM(S): at 13:08

## 2024-10-01 RX ADMIN — OXYCODONE HYDROCHLORIDE 15 MILLIGRAM(S): 30 TABLET, FILM COATED, EXTENDED RELEASE ORAL at 17:28

## 2024-10-01 RX ADMIN — Medication 500 MILLIGRAM(S): at 21:47

## 2024-10-01 RX ADMIN — HYDROMORPHONE HYDROCHLORIDE 0.5 MILLIGRAM(S): 1 INJECTION, SOLUTION INTRAMUSCULAR; INTRAVENOUS; SUBCUTANEOUS at 06:21

## 2024-10-01 RX ADMIN — OXYCODONE HYDROCHLORIDE 10 MILLIGRAM(S): 30 TABLET, FILM COATED, EXTENDED RELEASE ORAL at 04:26

## 2024-10-01 RX ADMIN — Medication 500 MILLIGRAM(S): at 05:03

## 2024-10-01 RX ADMIN — OXYCODONE HYDROCHLORIDE 15 MILLIGRAM(S): 30 TABLET, FILM COATED, EXTENDED RELEASE ORAL at 16:28

## 2024-10-01 RX ADMIN — OXYCODONE HYDROCHLORIDE 10 MILLIGRAM(S): 30 TABLET, FILM COATED, EXTENDED RELEASE ORAL at 09:27

## 2024-10-01 RX ADMIN — OXYCODONE HYDROCHLORIDE 15 MILLIGRAM(S): 30 TABLET, FILM COATED, EXTENDED RELEASE ORAL at 20:31

## 2024-10-01 RX ADMIN — HYDROMORPHONE HYDROCHLORIDE 0.5 MILLIGRAM(S): 1 INJECTION, SOLUTION INTRAMUSCULAR; INTRAVENOUS; SUBCUTANEOUS at 15:15

## 2024-10-01 RX ADMIN — OXYCODONE HYDROCHLORIDE 10 MILLIGRAM(S): 30 TABLET, FILM COATED, EXTENDED RELEASE ORAL at 12:27

## 2024-10-01 RX ADMIN — MIDODRINE HYDROCHLORIDE 10 MILLIGRAM(S): 5 TABLET ORAL at 13:08

## 2024-10-01 RX ADMIN — MIDODRINE HYDROCHLORIDE 10 MILLIGRAM(S): 5 TABLET ORAL at 05:03

## 2024-10-01 RX ADMIN — OXYCODONE HYDROCHLORIDE 10 MILLIGRAM(S): 30 TABLET, FILM COATED, EXTENDED RELEASE ORAL at 00:30

## 2024-10-01 RX ADMIN — PREGABALIN 50 MILLIGRAM(S): 25 CAPSULE ORAL at 21:47

## 2024-10-01 RX ADMIN — Medication 1000 MILLIGRAM(S): at 08:25

## 2024-10-01 RX ADMIN — POTASSIUM PHOSPHATE, MONOBASIC POTASSIUM PHOSPHATE, DIBASIC 83.33 MILLIMOLE(S): 224; 236 INJECTION, SOLUTION, CONCENTRATE INTRAVENOUS at 00:05

## 2024-10-01 RX ADMIN — HYDROMORPHONE HYDROCHLORIDE 0.5 MILLIGRAM(S): 1 INJECTION, SOLUTION INTRAMUSCULAR; INTRAVENOUS; SUBCUTANEOUS at 21:45

## 2024-10-01 RX ADMIN — HYDROMORPHONE HYDROCHLORIDE 0.5 MILLIGRAM(S): 1 INJECTION, SOLUTION INTRAMUSCULAR; INTRAVENOUS; SUBCUTANEOUS at 22:00

## 2024-10-01 RX ADMIN — OXYCODONE HYDROCHLORIDE 15 MILLIGRAM(S): 30 TABLET, FILM COATED, EXTENDED RELEASE ORAL at 21:00

## 2024-10-01 RX ADMIN — OXYCODONE HYDROCHLORIDE 10 MILLIGRAM(S): 30 TABLET, FILM COATED, EXTENDED RELEASE ORAL at 08:27

## 2024-10-01 RX ADMIN — Medication 100 MILLILITER(S): at 20:31

## 2024-10-01 RX ADMIN — Medication 1000 MILLIGRAM(S): at 07:25

## 2024-10-01 RX ADMIN — HYDROMORPHONE HYDROCHLORIDE 0.5 MILLIGRAM(S): 1 INJECTION, SOLUTION INTRAMUSCULAR; INTRAVENOUS; SUBCUTANEOUS at 06:06

## 2024-10-01 RX ADMIN — CHLORHEXIDINE GLUCONATE ORAL RINSE 1 APPLICATION(S): 1.2 SOLUTION DENTAL at 21:47

## 2024-10-01 RX ADMIN — OXYCODONE HYDROCHLORIDE 10 MILLIGRAM(S): 30 TABLET, FILM COATED, EXTENDED RELEASE ORAL at 13:27

## 2024-10-01 RX ADMIN — MULTI VITAMIN/MINERAL SUPPLEMENT WITH ASCORBIC ACID, NIACIN, PYRIDOXINE, PANTOTHENIC ACID, FOLIC ACID, RIBOFLAVIN, THIAMIN, BIOTIN, COBALAMIN AND ZINC. 1 TABLET(S): 60; 20; 12.5; 10; 10; 1.7; 1.5; 1; .3; .006 TABLET, COATED ORAL at 11:07

## 2024-10-01 RX ADMIN — OXYCODONE HYDROCHLORIDE 10 MILLIGRAM(S): 30 TABLET, FILM COATED, EXTENDED RELEASE ORAL at 04:56

## 2024-10-01 RX ADMIN — PREGABALIN 50 MILLIGRAM(S): 25 CAPSULE ORAL at 05:03

## 2024-10-01 RX ADMIN — HYDROMORPHONE HYDROCHLORIDE 0.5 MILLIGRAM(S): 1 INJECTION, SOLUTION INTRAMUSCULAR; INTRAVENOUS; SUBCUTANEOUS at 11:48

## 2024-10-01 RX ADMIN — HYDROMORPHONE HYDROCHLORIDE 0.5 MILLIGRAM(S): 1 INJECTION, SOLUTION INTRAMUSCULAR; INTRAVENOUS; SUBCUTANEOUS at 02:04

## 2024-10-01 RX ADMIN — HYDROMORPHONE HYDROCHLORIDE 0.5 MILLIGRAM(S): 1 INJECTION, SOLUTION INTRAMUSCULAR; INTRAVENOUS; SUBCUTANEOUS at 15:00

## 2024-10-01 RX ADMIN — PREGABALIN 50 MILLIGRAM(S): 25 CAPSULE ORAL at 13:08

## 2024-10-01 RX ADMIN — BISACODYL 5 MILLIGRAM(S): 5 TABLET, COATED ORAL at 05:03

## 2024-10-01 RX ADMIN — HYDROMORPHONE HYDROCHLORIDE 0.5 MILLIGRAM(S): 1 INJECTION, SOLUTION INTRAMUSCULAR; INTRAVENOUS; SUBCUTANEOUS at 12:03

## 2024-10-01 RX ADMIN — MIDODRINE HYDROCHLORIDE 10 MILLIGRAM(S): 5 TABLET ORAL at 21:47

## 2024-10-01 NOTE — PROGRESS NOTE ADULT - ASSESSMENT
ASSESSMENT: HPI: 74F on celecoxib for OA last took y’day otherwise no AC/AP h/o HTN, gastroparesis, peripheral neuropathy, multiple prior thoracolumbar spine surgeries for congenital scoliosis done >[place] ago [place] of state w/ removal of hardware 1991 presents with back pain s/p mechanical fall from sitting y'day. Describes severe mid back pain and left hip pain, no numbness, weakness, saddle anesthesia, b/b symptoms. Pain worsened with movement, axial loading. CT TL spine w/ acute nondisplaced fracture of T12 veterbral body extending into right pedicle w/o clear cord impingement.     s/p  T9-L3 open fusion with PSO/lag screw partial corpectomy/interbody with complex plastic closure (09/29/24)    NEURO:  - Continue to monitor output from left superficial RADHA drain.  and R deep RADHA drain  - Continue with neuro checks q4  - Continue pain regimen: lyrica, robaxin, tylenol and dilaudid 0.5 q3hrs, oxycodone  - Activity: [x] OOB as tolerated [] Bedrest [] PT [] OT [] PMNR    PULM:  - Incentive spirometry, mobilize as tolerated  - satting >92% on room air, 2L NC when sleeping    CV:  - Keep MAP >85  - TTE pending  - Wean off cullen, increased midodrine to 10 Q8  - home losartan held for MAP goals     RENAL:  - IVF until good PO intake  - Continue to monitor BMP   - Continue to monitor Is&Os    GI:  - Diet: Regular  - GI prophylaxis [] not indicated [] PPI [] other: pepcid (home med for GERD)  - Bowel regimen [x] miralax  [x] senna [] other:  - LBM: prior to admission    ENDO:   - Goal euglycemia (-180)    HEME/ONC:  - VTE prophylaxis: [x] SCDs [] chemoprophylaxis [] hold chemoprophylaxis due to: [] high risk of DVT/PE on admission due to:  - LED done on (09/30): negative    ID:  - afebrile    MISC:    SOCIAL/FAMILY:  [x] awaiting [] updated at bedside [] family meeting    CODE STATUS:  [x] Full Code [] DNR [] DNI [] Palliative/Comfort Care    DISPOSITION:  [x] ICU [] Stroke Unit [] Floor [] EMU [] RCU [] PCU    [x] Patient is at high risk of neurologic deterioration/death due to: pending clinical course    Time seen:  Time spent: __35_ [x] critical care minutes    Contact: [phone] ASSESSMENT: HPI: 74F on celecoxib for OA last took y’day otherwise no AC/AP h/o HTN, gastroparesis, peripheral neuropathy, multiple prior thoracolumbar spine surgeries for congenital scoliosis done >10 years ago out of state w/ removal of hardware 1991 presents with back pain s/p mechanical fall from sitting y'day. Describes severe mid back pain and left hip pain, no numbness, weakness, saddle anesthesia, b/b symptoms. Pain worsened with movement, axial loading. CT TL spine w/ acute nondisplaced fracture of T12 veterbral body extending into right pedicle w/o clear cord impingement.     s/p  T9-L3 open fusion with PSO/lag screw partial corpectomy/interbody with complex plastic closure (09/29/24)    NEURO:  - Continue to monitor output from left superficial RADHA drain.  and R deep RADHA drain, kepp the suction RADHA above head to decrease CSF drainage   - Plan: to stay flat for increased CSF drainage   - Continue with neuro checks q4  - Continue pain regimen: lyrica, robaxin, tylenol and dilaudid 0.5 q3hrs, oxycodone f/u acute pain reccs  - Activity: [x] OOB as tolerated [] Bedrest [] PT [] OT [] PMNR    PULM:  - Incentive spirometry, mobilize as tolerated  - satting >92% on room air, RA    CV:  - Keep MAP >85  - TTE pending  - Weaned off cullen, increased midodrine to 10 Q8  - home losartan held for MAP goals     RENAL:  - IVF: NS at 100ccs/hour   - until good PO intake  - Continue to monitor BMP   - Continue to monitor Is&Os    GI:  - Diet: Regular  - GI prophylaxis [] not indicated [] PPI [] other: pepcid (home med for GERD)  - Bowel regimen [x] miralax  [x] senna [] other:  - LBM: prior to admission  - Mg citrate if she doesn't not pass a bowel movement     ENDO:   - Goal euglycemia (-180)    HEME/ONC:  - VTE prophylaxis: [x] SCDs [] chemoprophylaxis [] hold chemoprophylaxis due to: [] high risk of DVT/PE on admission due to:  - LED done on (09/30): negative  - CBC at 2 pm today to assess for HG status  - DVT ppx: Post-op day 1, Lovenox 40 mg QD     ID:  - afebrile    MISC:    SOCIAL/FAMILY:  [x] awaiting [] updated at bedside [] family meeting    CODE STATUS:  [x] Full Code [] DNR [] DNI [] Palliative/Comfort Care    DISPOSITION:  [x] ICU [] Stroke Unit [] Floor [] EMU [] RCU [] PCU    [x] Patient is at high risk of neurologic deterioration/death due to: pending clinical course    Time seen:  Time spent: __35_ [x] critical care minutes    Contact: [phone]

## 2024-10-01 NOTE — CHART NOTE - NSCHARTNOTEFT_GEN_A_CORE
Patient's xray films of her pelvis came back negative for any traumatic injuries. At this point, ACS surgery will be signing off. Please reach back out to us with any additional concerns.    Trauma  [id number]

## 2024-10-01 NOTE — PROGRESS NOTE ADULT - SUBJECTIVE AND OBJECTIVE BOX
HOSPITAL COURSE: 74F on celecoxib for OA last took y’day otherwise no AC/AP h/o HTN, gastroparesis, peripheral neuropathy, multiple prior thoracolumbar spine surgeries for congenital scoliosis done >10 years ago out of state w/ removal of hardware 1991 presents with back pain s/p mechanical fall from sitting y'day. Describes severe mid back pain and left hip pain, no numbness, weakness, saddle anesthesia, b/b symptoms. Pain worsened with movement, axial loading. CT TL spine w/ acute nondisplaced fracture of T12 vertebral body extending into right pedicle w/o clear cord impingement.    24 hour Events:     9/29 s/p T9-L3 fusion. Pt had torn dura from injury. Placed on PCA pump.      Allergies    penicillins (Urticaria)  Dilantin (Urticaria)    Intolerances    erythromycin (Stomach Upset; Vomiting; Nausea)      REVIEW OF SYSTEMS: [ ] Unable to Assess due to neurologic exam   [ ] All ROS addressed below are non-contributory, except:  Neuro: [ ] Headache [ ] Back pain [ ] Numbness [ ] Weakness [ ] Ataxia [ ] Dizziness [ ] Aphasia [ ] Dysarthria [ ] Visual disturbance  Resp: [ ] Shortness of breath/dyspnea, [ ] Orthopnea [ ] Cough  CV: [ ] Chest pain [ ] Palpitation [ ] Lightheadedness [ ] Syncope  Renal: [ ] Thirst [ ] Edema  GI: [ ] Nausea [ ] Emesis [ ] Abdominal pain [ ] Constipation [ ] Diarrhea  Hem: [ ] Hematemesis [ ] bright red blood per rectum  ID: [ ] Fever [ ] Chills [ ] Dysuria  ENT: [ ] Rhinorrhea      DEVICES:   [ ] Restraints [ ] ET tube [ ] central line [ ] arterial line [ ] dumont [ ] NGT/OGT [ ] EVD [ ] LD [ ] RADHA/HMV [ ] Trach [ ] PEG [ ] Chest Tube     VITALS:   Vital Signs Last 24 Hrs  T(C): 36.9 (30 Sep 2024 15:00), Max: 37.4 (30 Sep 2024 07:00)  T(F): 98.4 (30 Sep 2024 15:00), Max: 99.4 (30 Sep 2024 07:00)  HR: 83 (30 Sep 2024 18:45) (59 - 100)  BP: 142/64 (30 Sep 2024 18:45) (97/55 - 166/72)  BP(mean): 92 (30 Sep 2024 18:45) (71 - 114)  RR: 19 (30 Sep 2024 18:45) (10 - 26)  SpO2: 92% (30 Sep 2024 18:45) (86% - 100%)    Parameters below as of 30 Sep 2024 15:00  Patient On (Oxygen Delivery Method): room air      CAPILLARY BLOOD GLUCOSE        I&O's Summary    29 Sep 2024 07:01  -  30 Sep 2024 07:00  --------------------------------------------------------  IN: 3036.4 mL / OUT: 2040 mL / NET: 996.4 mL    30 Sep 2024 07:01  -  30 Sep 2024 19:15  --------------------------------------------------------  IN: 1394.4 mL / OUT: 1900 mL / NET: -505.6 mL        Respiratory:    ABG - ( 29 Sep 2024 16:20 )  pH, Arterial: 7.32  pH, Blood: x     /  pCO2: 41    /  pO2: 143   / HCO3: 21    / Base Excess: -4.7  /  SaO2: 98.8                LABS:                        9.9    13.67 )-----------( 223      ( 30 Sep 2024 11:53 )             29.6     09-30    136  |  102  |  12  ----------------------------<  109[H]  3.0[L]   |  24  |  0.69             MEDICATION LEVELS:     IVF FLUIDS/MEDICATIONS:   MEDICATIONS  (STANDING):  acetaminophen     Tablet .. 1000 milliGRAM(s) Oral every 8 hours  methocarbamol 500 milliGRAM(s) Oral every 8 hours  midodrine 5 milliGRAM(s) Oral every 8 hours  multivitamin 1 Tablet(s) Oral daily  phenylephrine    Infusion 0.1 MICROgram(s)/kG/Min (3.32 mL/Hr) IV Continuous <Continuous>  polyethylene glycol 3350 17 Gram(s) Oral every 24 hours  pregabalin 50 milliGRAM(s) Oral three times a day  senna 2 Tablet(s) Oral at bedtime  sodium chloride 0.9%. 1000 milliLiter(s) (70 mL/Hr) IV Continuous <Continuous>    MEDICATIONS  (PRN):  bisacodyl 5 milliGRAM(s) Oral every 12 hours PRN Constipation  HYDROmorphone  Injectable 0.5 milliGRAM(s) IV Push every 3 hours PRN breakthru pain  magnesium hydroxide Suspension 30 milliLiter(s) Oral every 12 hours PRN Constipation  ondansetron   Disintegrating Tablet 4 milliGRAM(s) Oral every 6 hours PRN Nausea and/or Vomiting  ondansetron Injectable 4 milliGRAM(s) IV Push every 6 hours PRN Nausea  oxyCODONE    IR 10 milliGRAM(s) Oral every 4 hours PRN Severe Pain (7 - 10)  oxyCODONE    IR 5 milliGRAM(s) Oral every 4 hours PRN Moderate Pain (4 - 6)        IMAGING:   < from: [facility] Duplex Lower Ext Vein Scan, Bilat (09.30.24 @ 16:57) >  IMPRESSION:  No evidence of deep venous thrombosis in either lower extremity.      --- End of Report ---    MAGEN RODRIGUEZ MD; Attending Radiologist  This document has been electronically signed. Sep 30 2024  5:02PM    < end of copied text >        EXAMINATION:  PHYSICAL EXAM:    Constitutional: No Acute Distress     Neurological: Awake, alert oriented to person, place and time, Following Commands, PERRL, EOMI, No Gaze Preference, IRVERS 5/5                                                 Pulmonary: Clear to Auscultation, No rales, No rhonchi, No wheezes     Cardiovascular: S1, S2, Regular rate and rhythm     Gastrointestinal: Soft, Non-tender, Non-distended     Extremities: No calf tenderness        HOSPITAL COURSE: 74F on celecoxib for OA last took y’day otherwise no AC/AP h/o HTN, gastroparesis, peripheral neuropathy, multiple prior thoracolumbar spine surgeries for congenital scoliosis done >[place] years ago out of state w/ removal of hardware 1991 presents with back pain s/p mechanical fall from sitting y'day. Describes severe mid back pain and left hip pain, no numbness, weakness, saddle anesthesia, b/b symptoms. Pain worsened with movement, axial loading. CT TL spine w/ acute nondisplaced fracture of T12 vertebral body extending into right pedicle w/o clear cord impingement.    24 hour Events:     9/29 s/p T9-L3 fusion. Pt had torn dura from injury. Placed on PCA pump.  10/01:  EUSEBIO overnight    Allergies    penicillins (Urticaria)  Dilantin (Urticaria)    Intolerances    erythromycin (Stomach Upset; Vomiting; Nausea)      REVIEW OF SYSTEMS: [ ] Unable to Assess due to neurologic exam   [ ] All ROS addressed below are non-contributory, except:  Neuro: [ ] Headache [ ] Back pain [ ] Numbness [ ] Weakness [ ] Ataxia [ ] Dizziness [ ] Aphasia [ ] Dysarthria [ ] Visual disturbance  Resp: [ ] Shortness of breath/dyspnea, [ ] Orthopnea [ ] Cough  CV: [ ] Chest pain [ ] Palpitation [ ] Lightheadedness [ ] Syncope  Renal: [ ] Thirst [ ] Edema  GI: [ ] Nausea [ ] Emesis [ ] Abdominal pain [ ] Constipation [ ] Diarrhea  Hem: [ ] Hematemesis [ ] bright red blood per rectum  ID: [ ] Fever [ ] Chills [ ] Dysuria  ENT: [ ] Rhinorrhea      DEVICES:   [ ] Restraints [ ] ET tube [ ] central line [ ] arterial line [ ] dumont [ ] NGT/OGT [ ] EVD [ ] LD [ ] RADHA/HMV [ ] Trach [ ] PEG [ ] Chest Tube     ICU Vital Signs Last 24 Hrs  T(C): 37.2 (01 Oct 2024 07:00), Max: 37.2 (01 Oct 2024 07:00)  T(F): 99 (01 Oct 2024 07:00), Max: 99 (01 Oct 2024 07:00)  HR: 82 (01 Oct 2024 09:00) (67 - 100)  BP: 149/64 (01 Oct 2024 09:00) (97/55 - 186/77)  BP(mean): 92 (01 Oct 2024 09:00) (71 - 119)  ABP: 161/63 (30 Sep 2024 12:30) (138/44 - 175/63)  ABP(mean): 94 (30 Sep 2024 12:30) (75 - 107)  RR: 16 (01 Oct 2024 09:00) (10 - 30)  SpO2: 96% (01 Oct 2024 09:00) (92% - 100%)    O2 Parameters below as of 01 Oct 2024 09:00  Patient On (Oxygen Delivery Method): room air    I&O's Summary    30 Sep 2024 07:01  -  01 Oct 2024 07:00  --------------------------------------------------------  IN: 4052.8 mL / OUT: 3210 mL / NET: 842.8 mL    01 Oct 2024 07:01  -  01 Oct 2024 09:40  --------------------------------------------------------  IN: 140 mL / OUT: 150 mL / NET: -10 mL            Respiratory:    ABG - ( 29 Sep 2024 16:20 )  pH, Arterial: 7.32  pH, Blood: x     /  pCO2: 41    /  pO2: 143   / HCO3: 21    / Base Excess: -4.7  /  SaO2: 98.8                            9.4    10.92 )-----------( 212      ( 30 Sep 2024 21:24 )             29.5   09-30    140  |  108  |  16  ----------------------------<  89  4.2   |  22  |  0.87    Ca    8.1[L]      30 Sep 2024 21:24  Phos  2.6     09-30  Mg     1.9     09-30                 MEDICATION LEVELS:     IVF FLUIDS/MEDICATIONS:   MEDICATIONS  (STANDING):  acetaminophen     Tablet .. 1000 milliGRAM(s) Oral every 8 hours  methocarbamol 500 milliGRAM(s) Oral every 8 hours  midodrine 5 milliGRAM(s) Oral every 8 hours  multivitamin 1 Tablet(s) Oral daily  phenylephrine    Infusion 0.1 MICROgram(s)/kG/Min (3.32 mL/Hr) IV Continuous <Continuous>  polyethylene glycol 3350 17 Gram(s) Oral every 24 hours  pregabalin 50 milliGRAM(s) Oral three times a day  senna 2 Tablet(s) Oral at bedtime  sodium chloride 0.9%. 1000 milliLiter(s) (70 mL/Hr) IV Continuous <Continuous>    MEDICATIONS  (PRN):  bisacodyl 5 milliGRAM(s) Oral every 12 hours PRN Constipation  HYDROmorphone  Injectable 0.5 milliGRAM(s) IV Push every 3 hours PRN breakthru pain  magnesium hydroxide Suspension 30 milliLiter(s) Oral every 12 hours PRN Constipation  ondansetron   Disintegrating Tablet 4 milliGRAM(s) Oral every 6 hours PRN Nausea and/or Vomiting  ondansetron Injectable 4 milliGRAM(s) IV Push every 6 hours PRN Nausea  oxyCODONE    IR 10 milliGRAM(s) Oral every 4 hours PRN Severe Pain (7 - 10)  oxyCODONE    IR 5 milliGRAM(s) Oral every 4 hours PRN Moderate Pain (4 - 6)        IMAGING:   < from: [facility] Duplex Lower Ext Vein Scan, Bilat (09.30.24 @ 16:57) >  IMPRESSION:  No evidence of deep venous thrombosis in either lower extremity.      --- End of Report ---    MAGEN RODRIGUEZ MD; Attending Radiologist  This document has been electronically signed. Sep 30 2024  5:02PM    < end of copied text >        EXAMINATION:  PHYSICAL EXAM:    Constitutional: No Acute Distress     Neurological: Awake, alert oriented to person, place and time, Following Commands, PERRL, EOMI, No Gaze Preference, RIVERS 5/5                                                 Pulmonary: Clear to Auscultation, No rales, No rhonchi, No wheezes     Cardiovascular: S1, S2, Regular rate and rhythm     Gastrointestinal: Soft, Non-tender, Non-distended     Extremities: No calf tenderness

## 2024-10-01 NOTE — PROGRESS NOTE ADULT - SUBJECTIVE AND OBJECTIVE BOX
HOSPITAL COURSE: 74F on celecoxib for OA last took y’day otherwise no AC/AP h/o HTN, gastroparesis, peripheral neuropathy, multiple prior thoracolumbar spine surgeries for congenital scoliosis [place] >[place] ago [place] of state w/ removal of hardware 1991 presents with back pain s/p mechanical fall from sitting y'day. Describes severe mid back pain and left hip pain, no numbness, weakness, saddle anesthesia, b/b symptoms. Pain worsened with movement, axial loading. CT TL spine w/ acute nondisplaced fracture of T12 vertebral body extending into right pedicle w/o clear cord impingement.    24 hour Events:     9/29 s/p T9-L3 fusion. Pt had torn dura from injury. Placed on PCA pump.  10/01:  EUSEBIO overnight    Allergies    penicillins (Urticaria)  Dilantin (Urticaria)    Intolerances    erythromycin (Stomach Upset; Vomiting; Nausea)      REVIEW OF SYSTEMS: [ ] Unable to Assess due to neurologic exam   [ ] All ROS addressed below are non-contributory, except:  Neuro: [ ] Headache [ ] Back pain [ ] Numbness [ ] Weakness [ ] Ataxia [ ] Dizziness [ ] Aphasia [ ] Dysarthria [ ] Visual disturbance  Resp: [ ] Shortness of breath/dyspnea, [ ] Orthopnea [ ] Cough  CV: [ ] Chest pain [ ] Palpitation [ ] Lightheadedness [ ] Syncope  Renal: [ ] Thirst [ ] Edema  GI: [ ] Nausea [ ] Emesis [ ] Abdominal pain [ ] Constipation [ ] Diarrhea  Hem: [ ] Hematemesis [ ] bright red blood per rectum  ID: [ ] Fever [ ] Chills [ ] Dysuria  ENT: [ ] Rhinorrhea      DEVICES:   [ ] Restraints [ ] ET tube [ ] central line [ ] arterial line [ ] dumont [ ] NGT/OGT [ ] EVD [ ] LD [ ] RADHA/HMV [ ] Trach [ ] PEG [ ] Chest Tube     -----------------------------------------------------------------------------------------------------  ICU Vital Signs Last 24 Hrs  T(C): 36.8 (01 Oct 2024 19:00), Max: 37.2 (01 Oct 2024 07:00)  T(F): 98.2 (01 Oct 2024 19:00), Max: 99 (01 Oct 2024 07:00)  HR: 85 (01 Oct 2024 19:00) (67 - 100)  BP: 158/72 (01 Oct 2024 19:00) (106/55 - 186/77)  BP(mean): 102 (01 Oct 2024 19:00) (73 - 119)  ABP: --  ABP(mean): --  RR: 20 (01 Oct 2024 19:00) (13 - 30)  SpO2: 96% (01 Oct 2024 19:00) (93% - 100%)    O2 Parameters below as of 01 Oct 2024 19:00  Patient On (Oxygen Delivery Method): nasal cannula    O2 Concentration (%): 2        I&O's Summary    30 Sep 2024 07:01  -  01 Oct 2024 07:00  --------------------------------------------------------  IN: 4052.8 mL / OUT: 3210 mL / NET: 842.8 mL    01 Oct 2024 07:01  -  01 Oct 2024 20:29  --------------------------------------------------------  IN: 2080 mL / OUT: 1010 mL / NET: 1070 mL        MEDICATIONS  (STANDING):  chlorhexidine 4% Liquid 1 Application(s) Topical <User Schedule>  enoxaparin Injectable 40 milliGRAM(s) SubCutaneous every 24 hours  methocarbamol 500 milliGRAM(s) Oral every 8 hours  midodrine 10 milliGRAM(s) Oral every 8 hours  multivitamin 1 Tablet(s) Oral daily  polyethylene glycol 3350 17 Gram(s) Oral every 24 hours  pregabalin 50 milliGRAM(s) Oral three times a day  senna 2 Tablet(s) Oral at bedtime  sodium chloride 0.9%. 1000 milliLiter(s) (100 mL/Hr) IV Continuous <Continuous>  voquenza  10 mg 10 milliGRAM(s) 1 Tablet(s) Oral daily      RESPIRATORY:      IMAGING:   Recent imaging studies were reviewed.    LAB RESULTS:                          9.2    9.33  )-----------( 187      ( 01 Oct 2024 15:45 )             28.6           09-30    140  |  108  |  16  ----------------------------<  89  4.2   |  22  |  0.87    Ca    8.1[L]      30 Sep 2024 21:24  Phos  2.6     09-30  Mg     1.9     09-30                  -----------------------------------------------------------------------------------------------------------------------------------------------------------------------------------              EXAMINATION:  PHYSICAL EXAM:    Constitutional: No Acute Distress     Neurological: Awake, alert oriented to person, place and time, Following Commands, PERRL, EOMI, No Gaze Preference, RIVERS 5/5                                                 Pulmonary: Clear to Auscultation, No rales, No rhonchi, No wheezes     Cardiovascular: S1, S2, Regular rate and rhythm     Gastrointestinal: Soft, Non-tender, Non-distended     Extremities: No calf tenderness        HOSPITAL COURSE: 74F on celecoxib for OA last took y’day otherwise no AC/AP h/o HTN, gastroparesis, peripheral neuropathy, multiple prior thoracolumbar spine surgeries for congenital scoliosis done >10 years ago out of state w/ removal of hardware 1991 presents with back pain s/p mechanical fall from sitting y'day. Describes severe mid back pain and left hip pain, no numbness, weakness, saddle anesthesia, b/b symptoms. Pain worsened with movement, axial loading. CT TL spine w/ acute nondisplaced fracture of T12 vertebral body extending into right pedicle w/o clear cord impingement.    24 hour Events:     9/29 s/p T9-L3 fusion. Pt had torn dura from injury. Placed on PCA pump.  10/01:  EUSEBIO overnight    Allergies    penicillins (Urticaria)  Dilantin (Urticaria)    Intolerances    erythromycin (Stomach Upset; Vomiting; Nausea)      REVIEW OF SYSTEMS: [ ] Unable to Assess due to neurologic exam   [ ] All ROS addressed below are non-contributory, except:  Neuro: [ ] Headache [ ] Back pain [ ] Numbness [ ] Weakness [ ] Ataxia [ ] Dizziness [ ] Aphasia [ ] Dysarthria [ ] Visual disturbance  Resp: [ ] Shortness of breath/dyspnea, [ ] Orthopnea [ ] Cough  CV: [ ] Chest pain [ ] Palpitation [ ] Lightheadedness [ ] Syncope  Renal: [ ] Thirst [ ] Edema  GI: [ ] Nausea [ ] Emesis [ ] Abdominal pain [ ] Constipation [ ] Diarrhea  Hem: [ ] Hematemesis [ ] bright red blood per rectum  ID: [ ] Fever [ ] Chills [ ] Dysuria  ENT: [ ] Rhinorrhea      DEVICES:   [ ] Restraints [ ] ET tube [ ] central line [ ] arterial line [ ] dumont [ ] NGT/OGT [ ] EVD [ ] LD [ ] RADHA/HMV [ ] Trach [ ] PEG [ ] Chest Tube     -----------------------------------------------------------------------------------------------------  ICU Vital Signs Last 24 Hrs  T(C): 37.1 (02 Oct 2024 07:00), Max: 37.2 (02 Oct 2024 03:00)  T(F): 98.7 (02 Oct 2024 07:00), Max: 99 (02 Oct 2024 03:00)  HR: 86 (02 Oct 2024 07:00) (76 - 99)  BP: 153/70 (02 Oct 2024 07:00) (118/57 - 169/73)  BP(mean): 101 (02 Oct 2024 07:00) (82 - 105)  ABP: --  ABP(mean): --  RR: 19 (02 Oct 2024 07:00) (12 - 22)  SpO2: 100% (02 Oct 2024 07:00) (93% - 100%)    O2 Parameters below as of 02 Oct 2024 07:00  Patient On (Oxygen Delivery Method): nasal cannula  O2 Flow (L/min): 2    I&O's Summary    01 Oct 2024 07:01  -  02 Oct 2024 07:00  --------------------------------------------------------  IN: 4280 mL / OUT: 3335 mL / NET: 945 mL    02 Oct 2024 07:01  -  02 Oct 2024 10:01  --------------------------------------------------------  IN: 450 mL / OUT: 800 mL / NET: -350 mL            MEDICATIONS  (STANDING):  chlorhexidine 4% Liquid 1 Application(s) Topical <User Schedule>  enoxaparin Injectable 40 milliGRAM(s) SubCutaneous every 24 hours  methocarbamol 500 milliGRAM(s) Oral every 8 hours  midodrine 10 milliGRAM(s) Oral every 8 hours  multivitamin 1 Tablet(s) Oral daily  polyethylene glycol 3350 17 Gram(s) Oral every 24 hours  pregabalin 50 milliGRAM(s) Oral three times a day  senna 2 Tablet(s) Oral at bedtime  sodium chloride 0.9%. 1000 milliLiter(s) (100 mL/Hr) IV Continuous <Continuous>  voquenza  10 mg 10 milliGRAM(s) 1 Tablet(s) Oral daily      RESPIRATORY:      IMAGING:   Recent imaging studies were reviewed.                          9.0    8.65  )-----------( 175      ( 02 Oct 2024 05:07 )             28.9   10-02    137  |  103  |  12  ----------------------------<  106[H]  4.6   |  27  |  0.71    Ca    8.0[L]      02 Oct 2024 05:07  Phos  2.2     10-02  Mg     2.0     10-02                    -----------------------------------------------------------------------------------------------------------------------------------------------------------------------------------              EXAMINATION:  PHYSICAL EXAM:    Constitutional: No Acute Distress     Neurological: Awake, alert oriented to person, place and time, Following Commands, PERRL, EOMI, No Gaze Preference, RIVERS 5/5                                                 Pulmonary: Clear to Auscultation, No rales, No rhonchi, No wheezes     Cardiovascular: S1, S2, Regular rate and rhythm     Gastrointestinal: Soft, Non-tender, Non-distended     Extremities: No calf tenderness

## 2024-10-01 NOTE — PROGRESS NOTE ADULT - ASSESSMENT
HOB <30 deg     L superficial RADHA (70cc); R deep RADHA w/ bile bag (70cc); continue to monitor    No imaging

## 2024-10-01 NOTE — PROGRESS NOTE ADULT - TIME BILLING
acute nondisplaced fracture of T12 vertebral body extending into right pedicle w/o clear cord impingement.  9/29 s/p T9-L3 fusion. Pt had torn dura from injury.  neuro checks q 4 hr   PCA pump, oxy, hydromorphine PRN , pain team on consult   pregabalin 50 mg q 8 hr   keep flat for now, iwll discuss with Dr Stroud  has 2 drains , left superficial   RA   MAP goals > 85 mmhg, on midodrine 10 mg q 8 hr, TTE pending   NS 75 ml/hr  , increase to 100 ml/hr, repeat cbc 2 pm   midodrine 10 mg q 8 hr   d/c dumont   no BM senna and miralax, if no BM by afternoon, add mag citrate  lovenox 40 mg sc qhs        40 minutes   not critical

## 2024-10-01 NOTE — PROGRESS NOTE ADULT - SUBJECTIVE AND OBJECTIVE BOX
Patient seen and examined at bedside.    --Anticoagulation--    T(C): 36.9 (09-30-24 @ 23:00), Max: 37.4 (09-30-24 @ 07:00)  HR: 81 (10-01-24 @ 00:15) (67 - 100)  BP: 142/63 (10-01-24 @ 00:15) (97/55 - 186/77)  RR: 24 (10-01-24 @ 00:15) (10 - 30)  SpO2: 95% (10-01-24 @ 00:15) (86% - 100%)  Wt(kg): --    Exam: AOx3, PERRL, EOMI, FC, RIVERS 5/5

## 2024-10-01 NOTE — PROGRESS NOTE ADULT - ASSESSMENT
ASSESSMENT: HPI: 74F on celecoxib for OA last took y’day otherwise no AC/AP h/o HTN, gastroparesis, peripheral neuropathy, multiple prior thoracolumbar spine surgeries for congenital scoliosis done >[place] years ago [place] of state w/ removal of hardware 1991 presents with back pain s/p mechanical fall from sitting y'day. Describes severe mid back pain and left hip pain, no numbness, weakness, saddle anesthesia, b/b symptoms. Pain worsened with movement, axial loading. CT TL spine w/ acute nondisplaced fracture of T12 veterbral body extending into right pedicle w/o clear cord impingement.     s/p  T9-L3 open fusion with PSO/lag screw partial corpectomy/interbody with complex plastic closure (09/29/24)    NEURO:  - Continue to monitor output from left superficial RADHA drain.  and R deep RADHA drain, kepp the suction RADHA above head to decrease CSF drainage   - Plan: to stay flat for increased CSF drainage   - Continue with neuro checks q4  - Continue pain regimen: lyrica, robaxin, tylenol and dilaudid 0.5 q3hrs, oxycodone f/u acute pain reccs  - Activity: [x] OOB as tolerated [] Bedrest [] PT [] OT [] PMNR    PULM:  - Incentive spirometry, mobilize as tolerated  - satting >92% on room air, RA    CV:  - Keep MAP >85  - TTE pending  - Weaned off cullen, increased midodrine to 10 Q8  - home losartan held for MAP goals     RENAL:  - IVF: NS at 100ccs/hour   - until good PO intake  - Continue to monitor BMP   - Continue to monitor Is&Os    GI:  - Diet: Regular  - GI prophylaxis [] not indicated [] PPI [] other: pepcid (home med for GERD)  - Bowel regimen [x] miralax  [x] senna [] other:  - LBM: prior to admission  - Mg citrate if she doesn't not pass a bowel movement     ENDO:   - Goal euglycemia (-180)    HEME/ONC:  - VTE prophylaxis: [x] SCDs [] chemoprophylaxis [] hold chemoprophylaxis due to: [] high risk of DVT/PE on admission due to:  - LED done on (09/30): negative  - CBC at 2 pm today to assess for HG status  - DVT ppx: Post-op day 1, Lovenox 40 mg QD     ID:  - afebrile    MISC:    SOCIAL/FAMILY:  [x] awaiting [] updated at bedside [] family meeting    CODE STATUS:  [x] Full Code [] DNR [] DNI [] Palliative/Comfort Care    DISPOSITION:  [x] ICU [] Stroke Unit [] Floor [] EMU [] RCU [] PCU    [x] Patient is at high risk of neurologic deterioration/death due to: pending clinical course    Time seen:  Time spent: __35_ [x] critical care minutes    Contact: [phone] ASSESSMENT: HPI: 74F on celecoxib for OA last took y’day otherwise no AC/AP h/o HTN, gastroparesis, peripheral neuropathy, multiple prior thoracolumbar spine surgeries for congenital scoliosis done >10 years ago out of state w/ removal of hardware 1991 presents with back pain s/p mechanical fall from sitting y'day. Describes severe mid back pain and left hip pain, no numbness, weakness, saddle anesthesia, b/b symptoms. Pain worsened with movement, axial loading. CT TL spine w/ acute nondisplaced fracture of T12 veterbral body extending into right pedicle w/o clear cord impingement.     s/p  T9-L3 open fusion with PSO/lag screw partial corpectomy/interbody with complex plastic closure (09/29/24)    NEURO:  - Continue to monitor output from left superficial RADHA drain.  and R deep RADHA drain, kepp the suction RADHA above head to decrease CSF drainage   - Plan: to stay flat for increased CSF drainage   - Continue with neuro checks q4  - Continue pain regimen: oxy 5/10 and dilaudid for breakthrough pain  - Activity: [x] OOB as tolerated [] Bedrest [] PT [] OT [] PMNR    PULM:  - Incentive spirometry, mobilize as tolerated  - satting >92% on room air, RA    CV:  - Keep MAP >85  - TTE EF WNL  - Weaned off cullen, increased midodrine to 10 Q8  - home losartan held for MAP goals     RENAL:  - IVF: IVL  - Continue to monitor BMP   - Continue to monitor Is&Os    GI:  - Diet: Regular  - GI prophylaxis [] not indicated [] PPI [] other: pepcid (home med for GERD)  - Bowel regimen [x] miralax  [x] senna [] other:  - LBM: prior to admission  - Mg citrate if she doesn't not pass a bowel movement     ENDO:   - Goal euglycemia (-180)    HEME/ONC:  - VTE prophylaxis: [x] SCDs [] chemoprophylaxis [] hold chemoprophylaxis due to: [] high risk of DVT/PE on admission due to:  - LED done on (09/30): negative  - DVT ppx: Lovenox 40 mg QD     ID:  - afebrile    MISC:    SOCIAL/FAMILY:  [x] awaiting [] updated at bedside [] family meeting    CODE STATUS:  [x] Full Code [] DNR [] DNI [] Palliative/Comfort Care    DISPOSITION:  [x] ICU [] Stroke Unit [] Floor [] EMU [] RCU [] PCU    [x] Patient is at high risk of neurologic deterioration/death due to: pending clinical course    Time seen:  Time spent: __35_ [x] critical care minutes    Contact: [phone]

## 2024-10-02 LAB
ANION GAP SERPL CALC-SCNC: 11 MMOL/L — SIGNIFICANT CHANGE UP (ref 5–17)
ANION GAP SERPL CALC-SCNC: 7 MMOL/L — SIGNIFICANT CHANGE UP (ref 5–17)
BUN SERPL-MCNC: 12 MG/DL — SIGNIFICANT CHANGE UP (ref 7–23)
BUN SERPL-MCNC: 12 MG/DL — SIGNIFICANT CHANGE UP (ref 7–23)
CALCIUM SERPL-MCNC: 8 MG/DL — LOW (ref 8.4–10.5)
CALCIUM SERPL-MCNC: 8.1 MG/DL — LOW (ref 8.4–10.5)
CHLORIDE SERPL-SCNC: 101 MMOL/L — SIGNIFICANT CHANGE UP (ref 96–108)
CHLORIDE SERPL-SCNC: 103 MMOL/L — SIGNIFICANT CHANGE UP (ref 96–108)
CO2 SERPL-SCNC: 25 MMOL/L — SIGNIFICANT CHANGE UP (ref 22–31)
CO2 SERPL-SCNC: 27 MMOL/L — SIGNIFICANT CHANGE UP (ref 22–31)
CREAT SERPL-MCNC: 0.71 MG/DL — SIGNIFICANT CHANGE UP (ref 0.5–1.3)
CREAT SERPL-MCNC: 0.75 MG/DL — SIGNIFICANT CHANGE UP (ref 0.5–1.3)
EGFR: 82 ML/MIN/1.73M2 — SIGNIFICANT CHANGE UP
EGFR: 88 ML/MIN/1.73M2 — SIGNIFICANT CHANGE UP
GLUCOSE SERPL-MCNC: 103 MG/DL — HIGH (ref 70–99)
GLUCOSE SERPL-MCNC: 106 MG/DL — HIGH (ref 70–99)
HCT VFR BLD CALC: 28.9 % — LOW (ref 34.5–45)
HCT VFR BLD CALC: 30.3 % — LOW (ref 34.5–45)
HGB BLD-MCNC: 9 G/DL — LOW (ref 11.5–15.5)
HGB BLD-MCNC: 9.5 G/DL — LOW (ref 11.5–15.5)
MAGNESIUM SERPL-MCNC: 2 MG/DL — SIGNIFICANT CHANGE UP (ref 1.6–2.6)
MAGNESIUM SERPL-MCNC: 2 MG/DL — SIGNIFICANT CHANGE UP (ref 1.6–2.6)
MCHC RBC-ENTMCNC: 27.9 PG — SIGNIFICANT CHANGE UP (ref 27–34)
MCHC RBC-ENTMCNC: 28.4 PG — SIGNIFICANT CHANGE UP (ref 27–34)
MCHC RBC-ENTMCNC: 31.1 GM/DL — LOW (ref 32–36)
MCHC RBC-ENTMCNC: 31.4 GM/DL — LOW (ref 32–36)
MCV RBC AUTO: 89.5 FL — SIGNIFICANT CHANGE UP (ref 80–100)
MCV RBC AUTO: 90.4 FL — SIGNIFICANT CHANGE UP (ref 80–100)
NRBC # BLD: 0 /100 WBCS — SIGNIFICANT CHANGE UP (ref 0–0)
NRBC # BLD: 0 /100 WBCS — SIGNIFICANT CHANGE UP (ref 0–0)
PHOSPHATE SERPL-MCNC: 2.2 MG/DL — LOW (ref 2.5–4.5)
PHOSPHATE SERPL-MCNC: 2.9 MG/DL — SIGNIFICANT CHANGE UP (ref 2.5–4.5)
PLATELET # BLD AUTO: 175 K/UL — SIGNIFICANT CHANGE UP (ref 150–400)
PLATELET # BLD AUTO: 201 K/UL — SIGNIFICANT CHANGE UP (ref 150–400)
POTASSIUM SERPL-MCNC: 4.5 MMOL/L — SIGNIFICANT CHANGE UP (ref 3.5–5.3)
POTASSIUM SERPL-MCNC: 4.6 MMOL/L — SIGNIFICANT CHANGE UP (ref 3.5–5.3)
POTASSIUM SERPL-SCNC: 4.5 MMOL/L — SIGNIFICANT CHANGE UP (ref 3.5–5.3)
POTASSIUM SERPL-SCNC: 4.6 MMOL/L — SIGNIFICANT CHANGE UP (ref 3.5–5.3)
RBC # BLD: 3.23 M/UL — LOW (ref 3.8–5.2)
RBC # BLD: 3.35 M/UL — LOW (ref 3.8–5.2)
RBC # FLD: 14.9 % — HIGH (ref 10.3–14.5)
RBC # FLD: 15.2 % — HIGH (ref 10.3–14.5)
SODIUM SERPL-SCNC: 137 MMOL/L — SIGNIFICANT CHANGE UP (ref 135–145)
SODIUM SERPL-SCNC: 137 MMOL/L — SIGNIFICANT CHANGE UP (ref 135–145)
WBC # BLD: 8.65 K/UL — SIGNIFICANT CHANGE UP (ref 3.8–10.5)
WBC # BLD: 9.07 K/UL — SIGNIFICANT CHANGE UP (ref 3.8–10.5)
WBC # FLD AUTO: 8.65 K/UL — SIGNIFICANT CHANGE UP (ref 3.8–10.5)
WBC # FLD AUTO: 9.07 K/UL — SIGNIFICANT CHANGE UP (ref 3.8–10.5)

## 2024-10-02 PROCEDURE — 99232 SBSQ HOSP IP/OBS MODERATE 35: CPT

## 2024-10-02 RX ORDER — POTASSIUM PHOSPHATE, MONOBASIC POTASSIUM PHOSPHATE, DIBASIC 224; 236 MG/ML; MG/ML
30 INJECTION, SOLUTION, CONCENTRATE INTRAVENOUS ONCE
Refills: 0 | Status: COMPLETED | OUTPATIENT
Start: 2024-10-02 | End: 2024-10-02

## 2024-10-02 RX ORDER — BISACODYL 5 MG/1
10 TABLET, COATED ORAL ONCE
Refills: 0 | Status: COMPLETED | OUTPATIENT
Start: 2024-10-02 | End: 2024-10-02

## 2024-10-02 RX ADMIN — Medication 100 MILLILITER(S): at 07:33

## 2024-10-02 RX ADMIN — MULTI VITAMIN/MINERAL SUPPLEMENT WITH ASCORBIC ACID, NIACIN, PYRIDOXINE, PANTOTHENIC ACID, FOLIC ACID, RIBOFLAVIN, THIAMIN, BIOTIN, COBALAMIN AND ZINC. 1 TABLET(S): 60; 20; 12.5; 10; 10; 1.7; 1.5; 1; .3; .006 TABLET, COATED ORAL at 12:04

## 2024-10-02 RX ADMIN — OXYCODONE HYDROCHLORIDE 10 MILLIGRAM(S): 30 TABLET, FILM COATED, EXTENDED RELEASE ORAL at 10:30

## 2024-10-02 RX ADMIN — OXYCODONE HYDROCHLORIDE 15 MILLIGRAM(S): 30 TABLET, FILM COATED, EXTENDED RELEASE ORAL at 22:00

## 2024-10-02 RX ADMIN — OXYCODONE HYDROCHLORIDE 15 MILLIGRAM(S): 30 TABLET, FILM COATED, EXTENDED RELEASE ORAL at 00:30

## 2024-10-02 RX ADMIN — PREGABALIN 50 MILLIGRAM(S): 25 CAPSULE ORAL at 05:02

## 2024-10-02 RX ADMIN — HYDROMORPHONE HYDROCHLORIDE 0.5 MILLIGRAM(S): 1 INJECTION, SOLUTION INTRAMUSCULAR; INTRAVENOUS; SUBCUTANEOUS at 07:30

## 2024-10-02 RX ADMIN — Medication 2 TABLET(S): at 21:29

## 2024-10-02 RX ADMIN — OXYCODONE HYDROCHLORIDE 15 MILLIGRAM(S): 30 TABLET, FILM COATED, EXTENDED RELEASE ORAL at 14:30

## 2024-10-02 RX ADMIN — MIDODRINE HYDROCHLORIDE 10 MILLIGRAM(S): 5 TABLET ORAL at 05:02

## 2024-10-02 RX ADMIN — HYDROMORPHONE HYDROCHLORIDE 0.5 MILLIGRAM(S): 1 INJECTION, SOLUTION INTRAMUSCULAR; INTRAVENOUS; SUBCUTANEOUS at 02:15

## 2024-10-02 RX ADMIN — OXYCODONE HYDROCHLORIDE 15 MILLIGRAM(S): 30 TABLET, FILM COATED, EXTENDED RELEASE ORAL at 14:00

## 2024-10-02 RX ADMIN — OXYCODONE HYDROCHLORIDE 10 MILLIGRAM(S): 30 TABLET, FILM COATED, EXTENDED RELEASE ORAL at 10:00

## 2024-10-02 RX ADMIN — OXYCODONE HYDROCHLORIDE 10 MILLIGRAM(S): 30 TABLET, FILM COATED, EXTENDED RELEASE ORAL at 17:45

## 2024-10-02 RX ADMIN — OXYCODONE HYDROCHLORIDE 15 MILLIGRAM(S): 30 TABLET, FILM COATED, EXTENDED RELEASE ORAL at 06:00

## 2024-10-02 RX ADMIN — Medication 500 MILLIGRAM(S): at 14:02

## 2024-10-02 RX ADMIN — HYDROMORPHONE HYDROCHLORIDE 0.5 MILLIGRAM(S): 1 INJECTION, SOLUTION INTRAMUSCULAR; INTRAVENOUS; SUBCUTANEOUS at 03:00

## 2024-10-02 RX ADMIN — PREGABALIN 50 MILLIGRAM(S): 25 CAPSULE ORAL at 21:29

## 2024-10-02 RX ADMIN — OXYCODONE HYDROCHLORIDE 15 MILLIGRAM(S): 30 TABLET, FILM COATED, EXTENDED RELEASE ORAL at 01:00

## 2024-10-02 RX ADMIN — PREGABALIN 50 MILLIGRAM(S): 25 CAPSULE ORAL at 14:02

## 2024-10-02 RX ADMIN — Medication 17 GRAM(S): at 17:30

## 2024-10-02 RX ADMIN — ENOXAPARIN SODIUM 40 MILLIGRAM(S): 150 INJECTION SUBCUTANEOUS at 17:30

## 2024-10-02 RX ADMIN — OXYCODONE HYDROCHLORIDE 15 MILLIGRAM(S): 30 TABLET, FILM COATED, EXTENDED RELEASE ORAL at 04:51

## 2024-10-02 RX ADMIN — BISACODYL 10 MILLIGRAM(S): 5 TABLET, COATED ORAL at 23:24

## 2024-10-02 RX ADMIN — CHLORHEXIDINE GLUCONATE ORAL RINSE 1 APPLICATION(S): 1.2 SOLUTION DENTAL at 21:29

## 2024-10-02 RX ADMIN — OXYCODONE HYDROCHLORIDE 10 MILLIGRAM(S): 30 TABLET, FILM COATED, EXTENDED RELEASE ORAL at 17:30

## 2024-10-02 RX ADMIN — HYDROMORPHONE HYDROCHLORIDE 0.5 MILLIGRAM(S): 1 INJECTION, SOLUTION INTRAMUSCULAR; INTRAVENOUS; SUBCUTANEOUS at 12:00

## 2024-10-02 RX ADMIN — BISACODYL 5 MILLIGRAM(S): 5 TABLET, COATED ORAL at 14:02

## 2024-10-02 RX ADMIN — POTASSIUM PHOSPHATE, MONOBASIC POTASSIUM PHOSPHATE, DIBASIC 83.33 MILLIMOLE(S): 224; 236 INJECTION, SOLUTION, CONCENTRATE INTRAVENOUS at 12:04

## 2024-10-02 RX ADMIN — Medication 500 MILLIGRAM(S): at 05:02

## 2024-10-02 RX ADMIN — OXYCODONE HYDROCHLORIDE 15 MILLIGRAM(S): 30 TABLET, FILM COATED, EXTENDED RELEASE ORAL at 21:28

## 2024-10-02 RX ADMIN — HYDROMORPHONE HYDROCHLORIDE 0.5 MILLIGRAM(S): 1 INJECTION, SOLUTION INTRAMUSCULAR; INTRAVENOUS; SUBCUTANEOUS at 16:00

## 2024-10-02 RX ADMIN — Medication 500 MILLIGRAM(S): at 21:29

## 2024-10-02 RX ADMIN — HYDROMORPHONE HYDROCHLORIDE 0.5 MILLIGRAM(S): 1 INJECTION, SOLUTION INTRAMUSCULAR; INTRAVENOUS; SUBCUTANEOUS at 07:45

## 2024-10-02 RX ADMIN — MIDODRINE HYDROCHLORIDE 10 MILLIGRAM(S): 5 TABLET ORAL at 21:29

## 2024-10-02 RX ADMIN — HYDROMORPHONE HYDROCHLORIDE 0.5 MILLIGRAM(S): 1 INJECTION, SOLUTION INTRAMUSCULAR; INTRAVENOUS; SUBCUTANEOUS at 15:45

## 2024-10-02 RX ADMIN — MIDODRINE HYDROCHLORIDE 10 MILLIGRAM(S): 5 TABLET ORAL at 14:00

## 2024-10-02 RX ADMIN — HYDROMORPHONE HYDROCHLORIDE 0.5 MILLIGRAM(S): 1 INJECTION, SOLUTION INTRAMUSCULAR; INTRAVENOUS; SUBCUTANEOUS at 12:15

## 2024-10-02 NOTE — PROGRESS NOTE ADULT - SUBJECTIVE AND OBJECTIVE BOX
Reason for Admission	s/p T9-L3 open fusion with PSO/lag screw partial corpectomy/interbody with complex plastic closure (09/29/24)      · Subjective and Objective:   HOSPITAL COURSE: 74F on celecoxib for OA last took y’day otherwise no AC/AP h/o HTN, gastroparesis, peripheral neuropathy, multiple prior thoracolumbar spine surgeries for congenital scoliosis done >10 years ago out of state w/ removal of hardware 1991 presents with back pain s/p mechanical fall from sitting y'day. Describes severe mid back pain and left hip pain, no numbness, weakness, saddle anesthesia, b/b symptoms. Pain worsened with movement, axial loading. CT TL spine w/ acute nondisplaced fracture of T12 vertebral body extending into right pedicle w/o clear cord impingement.    24 hour Events:     9/29 s/p T9-L3 fusion. Pt had torn dura from injury. Placed on PCA pump.  10/01:  EUSEBIO overnight    PHYSICAL EXAM:    Constitutional: No Acute Distress     Neurological: Awake, alert oriented to person, place and time, Following Commands, PERRL, EOMI, No Gaze Preference, RIVERS 5/5                                                 Pulmonary: Clear to Auscultation, No rales, No rhonchi, No wheezes     Cardiovascular: S1, S2, Regular rate and rhythm     Gastrointestinal: Soft, Non-tender, Non-distended       VITALS:   Vital Signs Last 24 Hrs  T(C): 37.7 (02 Oct 2024 19:00), Max: 37.7 (02 Oct 2024 19:00)  T(F): 99.9 (02 Oct 2024 19:00), Max: 99.9 (02 Oct 2024 19:00)  HR: 92 (02 Oct 2024 19:00) (79 - 92)  BP: 126/62 (02 Oct 2024 19:00) (126/62 - 160/72)  BP(mean): 85 (02 Oct 2024 19:00) (85 - 105)  RR: 20 (02 Oct 2024 19:00) (12 - 20)  SpO2: 95% (02 Oct 2024 19:00) (95% - 100%)    Parameters below as of 02 Oct 2024 19:00  Patient On (Oxygen Delivery Method): nasal cannula  O2 Flow (L/min): 2      Drips     Respiratory:        LABS:                        9.0    8.65  )-----------( 175      ( 02 Oct 2024 05:07 )             28.9     10-02    137  |  103  |  12  ----------------------------<  106[H]  4.6   |  27  |  0.71      CAPILLARY BLOOD GLUCOSE          I&O's Summary    01 Oct 2024 07:01  -  02 Oct 2024 07:00  --------------------------------------------------------  IN: 4280 mL / OUT: 3335 mL / NET: 945 mL    02 Oct 2024 07:01  -  02 Oct 2024 20:20  --------------------------------------------------------  IN: 1749.8 mL / OUT: 3280 mL / NET: -1530.2 mL        Imaging   CXR     EKG     MEDICATION LEVELS:     IVF FLUIDS/MEDICATIONS:   MEDICATIONS  (STANDING):  chlorhexidine 4% Liquid 1 Application(s) Topical <User Schedule>  enoxaparin Injectable 40 milliGRAM(s) SubCutaneous every 24 hours  methocarbamol 500 milliGRAM(s) Oral every 8 hours  midodrine 10 milliGRAM(s) Oral every 8 hours  multivitamin 1 Tablet(s) Oral daily  polyethylene glycol 3350 17 Gram(s) Oral every 24 hours  pregabalin 50 milliGRAM(s) Oral three times a day  senna 2 Tablet(s) Oral at bedtime  voquenza  10 mg 10 milliGRAM(s) 1 Tablet(s) Oral daily    MEDICATIONS  (PRN):  acetaminophen     Tablet .. 1000 milliGRAM(s) Oral every 8 hours PRN Mild Pain (1 - 3)  bisacodyl 5 milliGRAM(s) Oral every 12 hours PRN Constipation  HYDROmorphone  Injectable 0.5 milliGRAM(s) IV Push every 3 hours PRN breakthru pain  magnesium citrate Oral Solution 296 milliLiter(s) Oral once PRN Constipation  magnesium hydroxide Suspension 30 milliLiter(s) Oral every 12 hours PRN Constipation  ondansetron   Disintegrating Tablet 4 milliGRAM(s) Oral every 6 hours PRN Nausea and/or Vomiting  ondansetron Injectable 4 milliGRAM(s) IV Push every 6 hours PRN Nausea  oxyCODONE    IR 10 milliGRAM(s) Oral every 4 hours PRN Moderate Pain (4 - 6)  oxyCODONE    IR 15 milliGRAM(s) Oral every 4 hours PRN Severe Pain (7 - 10)        Reason for Admission	s/p T9-L3 open fusion with PSO/lag screw partial corpectomy/interbody with complex plastic closure (09/29/24)      Subjective and Objective:   HOSPITAL COURSE: 74F on celecoxib for OA last took y’day otherwise no AC/AP h/o HTN, gastroparesis, peripheral neuropathy, multiple prior thoracolumbar spine surgeries for congenital scoliosis done >10 years ago out of state w/ removal of hardware 1991 presents with back pain s/p mechanical fall from sitting y'day. Describes severe mid back pain and left hip pain, no numbness, weakness, saddle anesthesia, b/b symptoms. Pain worsened with movement, axial loading. CT TL spine w/ acute nondisplaced fracture of T12 vertebral body extending into right pedicle w/o clear cord impingement.    24 hour Events:     9/29 s/p T9-L3 fusion. Pt had torn dura from injury. Placed on PCA pump.  10/01:  EUSEBIO overnight    PHYSICAL EXAM:    Constitutional: No Acute Distress     Neurological: Awake, alert oriented to person, place and time, Following Commands, PERRL, EOMI, No Gaze Preference, RIVERS 5/5                                                 Pulmonary: Clear to Auscultation, No rales, No rhonchi, No wheezes     Cardiovascular: S1, S2, Regular rate and rhythm     Gastrointestinal: Soft, Non-tender, Non-distended       VITALS:   Vital Signs Last 24 Hrs  T(C): 37.7 (02 Oct 2024 19:00), Max: 37.7 (02 Oct 2024 19:00)  T(F): 99.9 (02 Oct 2024 19:00), Max: 99.9 (02 Oct 2024 19:00)  HR: 92 (02 Oct 2024 19:00) (79 - 92)  BP: 126/62 (02 Oct 2024 19:00) (126/62 - 160/72)  BP(mean): 85 (02 Oct 2024 19:00) (85 - 105)  RR: 20 (02 Oct 2024 19:00) (12 - 20)  SpO2: 95% (02 Oct 2024 19:00) (95% - 100%)    Parameters below as of 02 Oct 2024 19:00  Patient On (Oxygen Delivery Method): nasal cannula  O2 Flow (L/min): 2      Drips     Respiratory:        LABS:                        9.0    8.65  )-----------( 175      ( 02 Oct 2024 05:07 )             28.9     10-02    137  |  103  |  12  ----------------------------<  106[H]  4.6   |  27  |  0.71      CAPILLARY BLOOD GLUCOSE          I&O's Summary    01 Oct 2024 07:01  -  02 Oct 2024 07:00  --------------------------------------------------------  IN: 4280 mL / OUT: 3335 mL / NET: 945 mL    02 Oct 2024 07:01  -  02 Oct 2024 20:20  --------------------------------------------------------  IN: 1749.8 mL / OUT: 3280 mL / NET: -1530.2 mL        Imaging   CXR     EKG     MEDICATION LEVELS:     IVF FLUIDS/MEDICATIONS:   MEDICATIONS  (STANDING):  chlorhexidine 4% Liquid 1 Application(s) Topical <User Schedule>  enoxaparin Injectable 40 milliGRAM(s) SubCutaneous every 24 hours  methocarbamol 500 milliGRAM(s) Oral every 8 hours  midodrine 10 milliGRAM(s) Oral every 8 hours  multivitamin 1 Tablet(s) Oral daily  polyethylene glycol 3350 17 Gram(s) Oral every 24 hours  pregabalin 50 milliGRAM(s) Oral three times a day  senna 2 Tablet(s) Oral at bedtime  voquenza  10 mg 10 milliGRAM(s) 1 Tablet(s) Oral daily    MEDICATIONS  (PRN):  acetaminophen     Tablet .. 1000 milliGRAM(s) Oral every 8 hours PRN Mild Pain (1 - 3)  bisacodyl 5 milliGRAM(s) Oral every 12 hours PRN Constipation  HYDROmorphone  Injectable 0.5 milliGRAM(s) IV Push every 3 hours PRN breakthru pain  magnesium citrate Oral Solution 296 milliLiter(s) Oral once PRN Constipation  magnesium hydroxide Suspension 30 milliLiter(s) Oral every 12 hours PRN Constipation  ondansetron   Disintegrating Tablet 4 milliGRAM(s) Oral every 6 hours PRN Nausea and/or Vomiting  ondansetron Injectable 4 milliGRAM(s) IV Push every 6 hours PRN Nausea  oxyCODONE    IR 10 milliGRAM(s) Oral every 4 hours PRN Moderate Pain (4 - 6)  oxyCODONE    IR 15 milliGRAM(s) Oral every 4 hours PRN Severe Pain (7 - 10)

## 2024-10-02 NOTE — PROGRESS NOTE ADULT - ASSESSMENT
acute nondisplaced fracture of T12 veterbral body extending into right pedicle w/o clear cord impingement.  9/29 s/p T9-L3 fusion. Pt had torn dura from injury.    neuro checks q 4 hr   patient not tolerating being being flat   oxy, hydromorphine PRN , pain team on consult   pregabalin 50 mg q 8 hr   has  2 drains superficial drain 220 cc, right deep 150 ml   RA , IS   MAP goals > 85 mmhg, on midodrine 10 mg q 8 hr, TTE EF normal   midodrine 10 mg q 8 hr    ml/hr,  IVL, HD stable   no BM senna and miralax, add mag citrate  lovenox 40 mg sc qhs        not critical     No change from attending's day plan.        acute nondisplaced fracture of T12 veterbral body extending into right pedicle w/o clear cord impingement.  9/29 s/p T9-L3 fusion. Pt had torn dura from injury.    neuro checks q 4 hr with protected sleep   patient not tolerating being being flat   oxy, hydromorphine PRN , pain team on consult   pregabalin 50 mg q 8 hr   has  2 drains superficial drain 220 cc, right deep 150 ml   RA , IS   MAP goals > 85 mmhg, on midodrine 10 mg q 8 hr, TTE EF normal   midodrine 10 mg q 8 hr   IVL, HD stable   no BM senna and miralax, add mag citrate  lovenox 40 mg sc qhs        not critical     No change from attending's day plan.

## 2024-10-02 NOTE — PROGRESS NOTE ADULT - SUBJECTIVE AND OBJECTIVE BOX
Reason for Admission:    Reason for Admission:  · Reason for Admission	s/p T9-L3 open fusion with PSO/lag screw partial corpectomy/interbody with complex plastic closure (09/29/24)      · Subjective and Objective:   HOSPITAL COURSE: 74F on celecoxib for OA last took y’day otherwise no AC/AP h/o HTN, gastroparesis, peripheral neuropathy, multiple prior thoracolumbar spine surgeries for congenital scoliosis done >[place] years ago out of state w/ removal of hardware 1991 presents with back pain s/p mechanical fall from sitting y'day. Describes severe mid back pain and left hip pain, no numbness, weakness, saddle anesthesia, b/b symptoms. Pain worsened with movement, axial loading. CT TL spine w/ acute nondisplaced fracture of T12 vertebral body extending into right pedicle w/o clear cord impingement.    24 hour Events:     9/29 s/p T9-L3 fusion. Pt had torn dura from injury. Placed on PCA pump.  10/01:  EUSEBIO overnight    Allergies    penicillins (Urticaria)  Dilantin (Urticaria)    Intolerances    erythromycin (Stomach Upset; Vomiting; Nausea)      REVIEW OF SYSTEMS: [ ] Unable to Assess due to neurologic exam   [ ] All ROS addressed below are non-contributory, except:  Neuro: [ ] Headache [ ] Back pain [ ] Numbness [ ] Weakness [ ] Ataxia [ ] Dizziness [ ] Aphasia [ ] Dysarthria [ ] Visual disturbance  Resp: [ ] Shortness of breath/dyspnea, [ ] Orthopnea [ ] Cough  CV: [ ] Chest pain [ ] Palpitation [ ] Lightheadedness [ ] Syncope  Renal: [ ] Thirst [ ] Edema  GI: [ ] Nausea [ ] Emesis [ ] Abdominal pain [ ] Constipation [ ] Diarrhea  Hem: [ ] Hematemesis [ ] bright red blood per rectum  ID: [ ] Fever [ ] Chills [ ] Dysuria  ENT: [ ] Rhinorrhea      DEVICES:   [ ] Restraints [ ] ET tube [ ] central line [ ] arterial line [ ] dumont [ ] NGT/OGT [ ] EVD [ ] LD [ ] RADHA/HMV [ ] Trach [ ] PEG [ ] Chest Tube     -----------------------------------------------------------------------------------------------------  T(C): 37.1 (10-02-24 @ 07:00), Max: 37.2 (10-02-24 @ 03:00)  HR: 86 (10-02-24 @ 07:00) (76 - 99)  BP: 153/70 (10-02-24 @ 07:00) (118/57 - 169/73)  RR: 19 (10-02-24 @ 07:00) (12 - 22)  SpO2: 100% (10-02-24 @ 07:00) (93% - 100%)  10-01-24 @ 07:01  -  10-02-24 @ 07:00  --------------------------------------------------------  IN: 4280 mL / OUT: 3335 mL / NET: 945 mL    10-02-24 @ 07:01  -  10-02-24 @ 10:09  --------------------------------------------------------  IN: 450 mL / OUT: 800 mL / NET: -350 mL    acetaminophen     Tablet .. 1000 milliGRAM(s) Oral every 8 hours PRN  bisacodyl 5 milliGRAM(s) Oral every 12 hours PRN  chlorhexidine 4% Liquid 1 Application(s) Topical <User Schedule>  enoxaparin Injectable 40 milliGRAM(s) SubCutaneous every 24 hours  HYDROmorphone  Injectable 0.5 milliGRAM(s) IV Push every 3 hours PRN  magnesium citrate Oral Solution 296 milliLiter(s) Oral once PRN  magnesium hydroxide Suspension 30 milliLiter(s) Oral every 12 hours PRN  methocarbamol 500 milliGRAM(s) Oral every 8 hours  midodrine 10 milliGRAM(s) Oral every 8 hours  multivitamin 1 Tablet(s) Oral daily  ondansetron   Disintegrating Tablet 4 milliGRAM(s) Oral every 6 hours PRN  ondansetron Injectable 4 milliGRAM(s) IV Push every 6 hours PRN  oxyCODONE    IR 10 milliGRAM(s) Oral every 4 hours PRN  oxyCODONE    IR 15 milliGRAM(s) Oral every 4 hours PRN  polyethylene glycol 3350 17 Gram(s) Oral every 24 hours  potassium phosphate IVPB 30 milliMole(s) IV Intermittent once  pregabalin 50 milliGRAM(s) Oral three times a day  senna 2 Tablet(s) Oral at bedtime  voquenza  10 mg 10 milliGRAM(s) 1 Tablet(s) Oral daily        EXAMINATION:  PHYSICAL EXAM:    Constitutional: No Acute Distress     Neurological: Awake, alert oriented to person, place and time, Following Commands, PERRL, EOMI, No Gaze Preference, RIVERS 5/5                                                 Pulmonary: Clear to Auscultation, No rales, No rhonchi, No wheezes     Cardiovascular: S1, S2, Regular rate and rhythm     Gastrointestinal: Soft, Non-tender, Non-distended     Extremities: No calf tenderness         LABS:  Na: 137 (10-02 @ 05:07), 140 (09-30 @ 21:24), 136 (09-30 @ 11:53), 134 (09-29 @ 19:31)  K: 4.6 (10-02 @ 05:07), 4.2 (09-30 @ 21:24), 3.0 (09-30 @ 11:53), 3.5 (09-29 @ 19:31)  Cl: 103 (10-02 @ 05:07), 108 (09-30 @ 21:24), 102 (09-30 @ 11:53), 100 (09-29 @ 19:31)  CO2: 27 (10-02 @ 05:07), 22 (09-30 @ 21:24), 24 (09-30 @ 11:53), 21 (09-29 @ 19:31)  BUN: 12 (10-02 @ 05:07), 16 (09-30 @ 21:24), 12 (09-30 @ 11:53), 13 (09-29 @ 19:31)  Cr: 0.71 (10-02 @ 05:07), 0.87 (09-30 @ 21:24), 0.69 (09-30 @ 11:53), 0.55 (09-29 @ 19:31)  Glu: 106(10-02 @ 05:07), 89(09-30 @ 21:24), 109(09-30 @ 11:53), 148(09-29 @ 19:31)    Hgb: 9.0 (10-02 @ 05:07), 9.2 (10-01 @ 15:45), 9.4 (09-30 @ 21:24), 9.9 (09-30 @ 11:53), 12.1 (09-29 @ 19:31)  Hct: 28.9 (10-02 @ 05:07), 28.6 (10-01 @ 15:45), 29.5 (09-30 @ 21:24), 29.6 (09-30 @ 11:53), 35.6 (09-29 @ 19:31)  WBC: 8.65 (10-02 @ 05:07), 9.33 (10-01 @ 15:45), 10.92 (09-30 @ 21:24), 13.67 (09-30 @ 11:53), 13.32 (09-29 @ 19:31)  Plt: 175 (10-02 @ 05:07), 187 (10-01 @ 15:45), 212 (09-30 @ 21:24), 223 (09-30 @ 11:53), 199 (09-29 @ 19:31)    INR:   PTT:             acute nondisplaced fracture of T12 veterbral body extending into right pedicle w/o clear cord impingement.  9/29 s/p T9-L3 fusion. Pt had torn dura from injury.  neuro checks q 4 hr   patient not tolerating being being flat   oxy, hydromorphine PRN , pain team on consult   pregabalin 50 mg q 8 hr   has  2 drains superficial drain 220 cc, right deep 150 ml   RA , IS   MAP goals > 85 mmhg, on midodrine 10 mg q 8 hr, TTE EF normal   midodrine 10 mg q 8 hr    ml/hr,  IVL, HD stable   no BM senna and miralax, add mag citrate  lovenox 40 mg sc qhs         35  minutes   not critical

## 2024-10-02 NOTE — PROGRESS NOTE ADULT - SUBJECTIVE AND OBJECTIVE BOX
Patient seen and examined at bedside.    --Anticoagulation--  enoxaparin Injectable 40 milliGRAM(s) SubCutaneous every 24 hours    T(C): 36.9 (10-01-24 @ 23:00), Max: 37.2 (10-01-24 @ 07:00)  HR: 88 (10-01-24 @ 23:00) (72 - 100)  BP: 160/72 (10-01-24 @ 23:00) (118/57 - 185/76)  RR: 20 (10-01-24 @ 23:00) (14 - 24)  SpO2: 99% (10-01-24 @ 23:00) (93% - 99%)  Wt(kg): --    Exam: AOx3, PERRL, EOMI, FC, RIVERS 5/5

## 2024-10-02 NOTE — PROGRESS NOTE ADULT - TIME BILLING
acute nondisplaced fracture of T12 veterbral body extending into right pedicle w/o clear cord impingement.  9/29 s/p T9-L3 fusion. Pt had torn dura from injury.  neuro checks q 4 hr   patient not tolerating being being flat   oxy, hydromorphine PRN , pain team on consult   pregabalin 50 mg q 8 hr   has  2 drains superficial drain 220 cc, right deep 150 ml   RA , IS   MAP goals > 85 mmhg, on midodrine 10 mg q 8 hr, TTE EF normal   midodrine 10 mg q 8 hr    ml/hr,  IVL, HD stable   no BM senna and miralax, add mag citrate  lovenox 40 mg sc qhs        35  minutes   not critical

## 2024-10-03 PROCEDURE — 99232 SBSQ HOSP IP/OBS MODERATE 35: CPT

## 2024-10-03 RX ORDER — DIPHENHYDRAMINE HCL 12.5MG/5ML
25 LIQUID (ML) ORAL ONCE
Refills: 0 | Status: COMPLETED | OUTPATIENT
Start: 2024-10-03 | End: 2024-10-03

## 2024-10-03 RX ORDER — SOD PHOS DI, MONO/K PHOS MONO 250 MG
1 TABLET ORAL ONCE
Refills: 0 | Status: COMPLETED | OUTPATIENT
Start: 2024-10-03 | End: 2024-10-03

## 2024-10-03 RX ADMIN — HYDROMORPHONE HYDROCHLORIDE 0.5 MILLIGRAM(S): 1 INJECTION, SOLUTION INTRAMUSCULAR; INTRAVENOUS; SUBCUTANEOUS at 19:00

## 2024-10-03 RX ADMIN — PREGABALIN 50 MILLIGRAM(S): 25 CAPSULE ORAL at 05:59

## 2024-10-03 RX ADMIN — Medication 500 MILLIGRAM(S): at 05:59

## 2024-10-03 RX ADMIN — OXYCODONE HYDROCHLORIDE 10 MILLIGRAM(S): 30 TABLET, FILM COATED, EXTENDED RELEASE ORAL at 20:38

## 2024-10-03 RX ADMIN — BISACODYL 5 MILLIGRAM(S): 5 TABLET, COATED ORAL at 18:35

## 2024-10-03 RX ADMIN — OXYCODONE HYDROCHLORIDE 15 MILLIGRAM(S): 30 TABLET, FILM COATED, EXTENDED RELEASE ORAL at 17:15

## 2024-10-03 RX ADMIN — Medication 2 TABLET(S): at 23:39

## 2024-10-03 RX ADMIN — Medication 17 GRAM(S): at 05:59

## 2024-10-03 RX ADMIN — Medication 25 MILLIGRAM(S): at 20:08

## 2024-10-03 RX ADMIN — HYDROMORPHONE HYDROCHLORIDE 0.5 MILLIGRAM(S): 1 INJECTION, SOLUTION INTRAMUSCULAR; INTRAVENOUS; SUBCUTANEOUS at 04:00

## 2024-10-03 RX ADMIN — OXYCODONE HYDROCHLORIDE 15 MILLIGRAM(S): 30 TABLET, FILM COATED, EXTENDED RELEASE ORAL at 02:21

## 2024-10-03 RX ADMIN — OXYCODONE HYDROCHLORIDE 15 MILLIGRAM(S): 30 TABLET, FILM COATED, EXTENDED RELEASE ORAL at 23:40

## 2024-10-03 RX ADMIN — HYDROMORPHONE HYDROCHLORIDE 0.5 MILLIGRAM(S): 1 INJECTION, SOLUTION INTRAMUSCULAR; INTRAVENOUS; SUBCUTANEOUS at 12:00

## 2024-10-03 RX ADMIN — HYDROMORPHONE HYDROCHLORIDE 0.5 MILLIGRAM(S): 1 INJECTION, SOLUTION INTRAMUSCULAR; INTRAVENOUS; SUBCUTANEOUS at 00:25

## 2024-10-03 RX ADMIN — OXYCODONE HYDROCHLORIDE 10 MILLIGRAM(S): 30 TABLET, FILM COATED, EXTENDED RELEASE ORAL at 20:08

## 2024-10-03 RX ADMIN — Medication 500 MILLIGRAM(S): at 23:37

## 2024-10-03 RX ADMIN — OXYCODONE HYDROCHLORIDE 15 MILLIGRAM(S): 30 TABLET, FILM COATED, EXTENDED RELEASE ORAL at 16:45

## 2024-10-03 RX ADMIN — ENOXAPARIN SODIUM 40 MILLIGRAM(S): 150 INJECTION SUBCUTANEOUS at 18:35

## 2024-10-03 RX ADMIN — OXYCODONE HYDROCHLORIDE 15 MILLIGRAM(S): 30 TABLET, FILM COATED, EXTENDED RELEASE ORAL at 08:30

## 2024-10-03 RX ADMIN — OXYCODONE HYDROCHLORIDE 15 MILLIGRAM(S): 30 TABLET, FILM COATED, EXTENDED RELEASE ORAL at 09:00

## 2024-10-03 RX ADMIN — MIDODRINE HYDROCHLORIDE 10 MILLIGRAM(S): 5 TABLET ORAL at 05:59

## 2024-10-03 RX ADMIN — PREGABALIN 50 MILLIGRAM(S): 25 CAPSULE ORAL at 23:38

## 2024-10-03 RX ADMIN — MULTI VITAMIN/MINERAL SUPPLEMENT WITH ASCORBIC ACID, NIACIN, PYRIDOXINE, PANTOTHENIC ACID, FOLIC ACID, RIBOFLAVIN, THIAMIN, BIOTIN, COBALAMIN AND ZINC. 1 TABLET(S): 60; 20; 12.5; 10; 10; 1.7; 1.5; 1; .3; .006 TABLET, COATED ORAL at 12:04

## 2024-10-03 RX ADMIN — HYDROMORPHONE HYDROCHLORIDE 0.5 MILLIGRAM(S): 1 INJECTION, SOLUTION INTRAMUSCULAR; INTRAVENOUS; SUBCUTANEOUS at 12:15

## 2024-10-03 RX ADMIN — Medication 1 PACKET(S): at 05:59

## 2024-10-03 RX ADMIN — OXYCODONE HYDROCHLORIDE 15 MILLIGRAM(S): 30 TABLET, FILM COATED, EXTENDED RELEASE ORAL at 03:00

## 2024-10-03 RX ADMIN — HYDROMORPHONE HYDROCHLORIDE 0.5 MILLIGRAM(S): 1 INJECTION, SOLUTION INTRAMUSCULAR; INTRAVENOUS; SUBCUTANEOUS at 01:00

## 2024-10-03 RX ADMIN — HYDROMORPHONE HYDROCHLORIDE 0.5 MILLIGRAM(S): 1 INJECTION, SOLUTION INTRAMUSCULAR; INTRAVENOUS; SUBCUTANEOUS at 18:45

## 2024-10-03 NOTE — DIETITIAN INITIAL EVALUATION ADULT - NS FNS WEIGHT CHANGE REASON
pt reports 50 pounds weight gain x 1 year.  pounds but over the past year reports to have had health issues and has been eating more./unintentional

## 2024-10-03 NOTE — PROGRESS NOTE ADULT - SUBJECTIVE AND OBJECTIVE BOX
Reason for Admission	s/p T9-L3 open fusion with PSO/lag screw partial corpectomy/interbody with complex plastic closure (09/29/24)      Subjective and Objective:   HOSPITAL COURSE: 74F on celecoxib for OA last took y’day otherwise no AC/AP h/o HTN, gastroparesis, peripheral neuropathy, multiple prior thoracolumbar spine surgeries for congenital scoliosis done >10 years ago out of state w/ removal of hardware 1991 presents with back pain s/p mechanical fall from sitting y'day. Describes severe mid back pain and left hip pain, no numbness, weakness, saddle anesthesia, b/b symptoms. Pain worsened with movement, axial loading. CT TL spine w/ acute nondisplaced fracture of T12 vertebral body extending into right pedicle w/o clear cord impingement.    24 hour Events:     charles    PHYSICAL EXAM:    Constitutional: No Acute Distress     Neurological: Awake, alert oriented to person, place and time, Following Commands, PERRL, EOMI, No Gaze Preference, RIVERS 5/5                                                 Pulmonary: Clear to Auscultation, No rales, No rhonchi, No wheezes     Cardiovascular: S1, S2, Regular rate and rhythm     Gastrointestinal: Soft, Non-tender, Non-distended     VITALS:   Vital Signs Last 24 Hrs  T(C): 36.2 (03 Oct 2024 19:00), Max: 37.6 (03 Oct 2024 03:00)  T(F): 97.1 (03 Oct 2024 19:00), Max: 99.6 (03 Oct 2024 03:00)  HR: 93 (03 Oct 2024 19:00) (82 - 95)  BP: 136/71 (03 Oct 2024 19:00) (106/52 - 142/60)  BP(mean): 98 (03 Oct 2024 19:00) (75 - 98)  RR: 23 (03 Oct 2024 19:00) (12 - 23)  SpO2: 95% (03 Oct 2024 19:00) (93% - 99%)    Parameters below as of 03 Oct 2024 07:00  Patient On (Oxygen Delivery Method): nasal cannula  O2 Flow (L/min): 2      Drips     Respiratory:        LABS:                        9.5    9.07  )-----------( 201      ( 02 Oct 2024 21:52 )             30.3     10-02    137  |  101  |  12  ----------------------------<  103[H]  4.5   |  25  |  0.75      CAPILLARY BLOOD GLUCOSE          I&O's Summary    02 Oct 2024 07:01  -  03 Oct 2024 07:00  --------------------------------------------------------  IN: 2749.8 mL / OUT: 4550 mL / NET: -1800.2 mL    03 Oct 2024 07:01  -  03 Oct 2024 23:10  --------------------------------------------------------  IN: 700 mL / OUT: 1150 mL / NET: -450 mL        Imaging   CXR     EKG     MEDICATION LEVELS:     IVF FLUIDS/MEDICATIONS:   MEDICATIONS  (STANDING):  chlorhexidine 4% Liquid 1 Application(s) Topical <User Schedule>  enoxaparin Injectable 40 milliGRAM(s) SubCutaneous every 24 hours  methocarbamol 500 milliGRAM(s) Oral every 8 hours  midodrine 10 milliGRAM(s) Oral every 8 hours  multivitamin 1 Tablet(s) Oral daily  polyethylene glycol 3350 17 Gram(s) Oral two times a day  pregabalin 50 milliGRAM(s) Oral three times a day  senna 2 Tablet(s) Oral at bedtime  voquenza  10 mg 10 milliGRAM(s) 1 Tablet(s) Oral daily    MEDICATIONS  (PRN):  acetaminophen     Tablet .. 1000 milliGRAM(s) Oral every 8 hours PRN Mild Pain (1 - 3)  bisacodyl 5 milliGRAM(s) Oral every 12 hours PRN Constipation  HYDROmorphone  Injectable 0.5 milliGRAM(s) IV Push every 3 hours PRN breakthru pain  magnesium citrate Oral Solution 296 milliLiter(s) Oral once PRN Constipation  magnesium hydroxide Suspension 30 milliLiter(s) Oral every 12 hours PRN Constipation  ondansetron   Disintegrating Tablet 4 milliGRAM(s) Oral every 6 hours PRN Nausea and/or Vomiting  ondansetron Injectable 4 milliGRAM(s) IV Push every 6 hours PRN Nausea  oxyCODONE    IR 10 milliGRAM(s) Oral every 4 hours PRN Moderate Pain (4 - 6)  oxyCODONE    IR 15 milliGRAM(s) Oral every 4 hours PRN Severe Pain (7 - 10)

## 2024-10-03 NOTE — DIETITIAN INITIAL EVALUATION ADULT - REASON FOR ADMISSION
per chart: " 74F on celecoxib for OA last took y’day otherwise no AC/AP h/o HTN, gastroparesis, peripheral neuropathy, multiple prior thoracolumbar spine surgeries for congenital scoliosis [place] >[place] w/ removal of hardware 1991 presents with back pain s/p mechanical fall from sitting"

## 2024-10-03 NOTE — PROGRESS NOTE ADULT - ASSESSMENT
acute nondisplaced fracture of T12 veterbral body extending into right pedicle w/o clear cord impingement.  9/29 s/p T9-L3 fusion. Pt had torn dura from injury.    neuro checks q 4 hr with protected sleep   patient tolerating being being flat   oxy, hydromorphine PRN , pain team on consult   pregabalin 50 mg q 8 hr   has  2 drains superficial drain 135 cc off suction , right deep 15 ml   RA , IS   MAP goals > 65 mmhg, on midodrine 10 mg q 8 hr, TTE EF normal   IVL, HD stable   last BM: 10/03  lovenox 40 mg sc qhs        not critical     No change from attending's day plan.

## 2024-10-03 NOTE — PROGRESS NOTE ADULT - TIME BILLING
acute nondisplaced fracture of T12 veterbral body extending into right pedicle w/o clear cord impingement.  9/29 s/p T9-L3 fusion. Pt had torn dura from injury.  neuro checks q 4 hr   patient not tolerating being being flat   oxy, hydromorphine PRN , pain team on consult   pregabalin 50 mg q 8 hr   has  2 drains superficial drain 220 cc, right deep 150 ml   RA , IS   MAP goals > 85 mmhg, on midodrine 10 mg q 8 hr, TTE EF normal   midodrine 10 mg q 8 hr    ml/hr,  IVL, HD stable   no BM senna and miralax, add mag citrate  lovenox 40 mg sc qhs        35  minutes   not critical acute nondisplaced fracture of T12 veterbral body extending into right pedicle w/o clear cord impingement.  9/29 s/p T9-L3 fusion. Pt had torn dura from injury.  neuro checks q 4 hr   oxy, hydromorphine PRN , pain team on consult   pregabalin 50 mg q 8 hr   has  2 drains superficial drain 135 cc, right deep 15 ml keep drain off suction per Dr Stroud   RA , IS   MAP goals > 65 mmhg, on midodrine 10 mg q 8 hr, decrease over the coming days  TTE EF normal   IVL, HD stable   3 BM today  senna and miralax, add mag citrate  lovenox 40 mg sc qhs  , LE dopplers neg       35  minutes   not critical

## 2024-10-03 NOTE — PROGRESS NOTE ADULT - ASSESSMENT
acute nondisplaced fracture of T12 veterbral body extending into right pedicle w/o clear cord impingement.  9/29 s/p T9-L3 fusion. Pt had torn dura from injury.    neuro checks q 4 hr with protected sleep   patient tolerating being being flat   oxy, hydromorphine PRN , pain team on consult   pregabalin 50 mg q 8 hr   has  2 drains superficial drain 135 cc off suction , right deep 15 ml   RA , IS   MAP goals > 65 mmhg, on midodrine 10 mg q 8 hr, TTE EF normal   IVL, HD stable   last BM: 10/03  lovenox 40 mg sc qhs      No change from attending's day plan.   not critical     No change from attending's day plan.

## 2024-10-03 NOTE — PROGRESS NOTE ADULT - SUBJECTIVE AND OBJECTIVE BOX
Patient seen and examined at bedside.    --Anticoagulation--  enoxaparin Injectable 40 milliGRAM(s) SubCutaneous every 24 hours    T(C): 37.7 (10-02-24 @ 19:00), Max: 37.7 (10-02-24 @ 19:00)  HR: 92 (10-02-24 @ 19:00) (79 - 92)  BP: 126/62 (10-02-24 @ 19:00) (126/62 - 153/70)  RR: 20 (10-02-24 @ 19:00) (12 - 20)  SpO2: 95% (10-02-24 @ 19:00) (95% - 100%)  Wt(kg): --    Exam: AOx3, PERRL, EOMI, FC, RIVERS 5/5

## 2024-10-03 NOTE — DIETITIAN INITIAL EVALUATION ADULT - NSFNSPHYEXAMSKINFT_GEN_A_CORE
Pressure Injury 1: none, Healed  Pressure Injury 2: sacral spine, Healed  Pressure Injury 3: none, none  Pressure Injury 4: none, none  Pressure Injury 5: none, none  Pressure Injury 6: none, none  Pressure Injury 7: none, none  Pressure Injury 8: none, none  Pressure Injury 9: none, none  Pressure Injury 10: none, none  Pressure Injury 11: none, none  .  spinal incision

## 2024-10-03 NOTE — PROGRESS NOTE ADULT - SUBJECTIVE AND OBJECTIVE BOX
Reason for Admission	s/p T9-L3 open fusion with PSO/lag screw partial corpectomy/interbody with complex plastic closure (09/29/24)      Subjective and Objective:   HOSPITAL COURSE: 74F on celecoxib for OA last took y’day otherwise no AC/AP h/o HTN, gastroparesis, peripheral neuropathy, multiple prior thoracolumbar spine surgeries for congenital scoliosis done >[place] years ago [place] of state w/ removal of hardware 1991 presents with back pain s/p mechanical fall from sitting y'day. Describes severe mid back pain and left hip pain, no numbness, weakness, saddle anesthesia, b/b symptoms. Pain worsened with movement, axial loading. CT TL spine w/ acute nondisplaced fracture of T12 vertebral body extending into right pedicle w/o clear cord impingement.    24 hour Events:     9/29 s/p T9-L3 fusion. Pt had torn dura from injury. Placed on PCA pump.  10/01:  EUSEBIO overnight  10/02: Left RADHA off suction    PHYSICAL EXAM:    Constitutional: No Acute Distress     Neurological: Awake, alert oriented to person, place and time, Following Commands, PERRL, EOMI, No Gaze Preference, RIVERS 5/5                                                 Pulmonary: Clear to Auscultation, No rales, No rhonchi, No wheezes     Cardiovascular: S1, S2, Regular rate and rhythm     Gastrointestinal: Soft, Non-tender, Non-distended     ICU Vital Signs Last 24 Hrs  T(C): 36.9 (03 Oct 2024 07:00), Max: 37.7 (02 Oct 2024 19:00)  T(F): 98.5 (03 Oct 2024 07:00), Max: 99.9 (02 Oct 2024 19:00)  HR: 91 (03 Oct 2024 07:00) (79 - 93)  BP: 129/61 (03 Oct 2024 07:00) (126/62 - 150/71)  BP(mean): 88 (03 Oct 2024 07:00) (85 - 102)  ABP: --  ABP(mean): --  RR: 20 (03 Oct 2024 07:00) (12 - 20)  SpO2: 93% (03 Oct 2024 07:00) (93% - 99%)    O2 Parameters below as of 03 Oct 2024 07:00  Patient On (Oxygen Delivery Method): nasal cannula  O2 Flow (L/min): 2                            9.5    9.07  )-----------( 201      ( 02 Oct 2024 21:52 )             30.3   10-02    137  |  101  |  12  ----------------------------<  103[H]  4.5   |  25  |  0.75    Ca    8.1[L]      02 Oct 2024 21:52  Phos  2.9     10-02  Mg     2.0     10-02              I&O's Summary    01 Oct 2024 07:01  -  02 Oct 2024 07:00  --------------------------------------------------------  IN: 4280 mL / OUT: 3335 mL / NET: 945 mL    02 Oct 2024 07:01  -  02 Oct 2024 20:20  --------------------------------------------------------  IN: 1749.8 mL / OUT: 3280 mL / NET: -1530.2 mL        Imaging   CXR     EKG     MEDICATION LEVELS:     IVF FLUIDS/MEDICATIONS:   MEDICATIONS  (STANDING):  chlorhexidine 4% Liquid 1 Application(s) Topical <User Schedule>  enoxaparin Injectable 40 milliGRAM(s) SubCutaneous every 24 hours  methocarbamol 500 milliGRAM(s) Oral every 8 hours  midodrine 10 milliGRAM(s) Oral every 8 hours  multivitamin 1 Tablet(s) Oral daily  polyethylene glycol 3350 17 Gram(s) Oral every 24 hours  pregabalin 50 milliGRAM(s) Oral three times a day  senna 2 Tablet(s) Oral at bedtime  voquenza  10 mg 10 milliGRAM(s) 1 Tablet(s) Oral daily    MEDICATIONS  (PRN):  acetaminophen     Tablet .. 1000 milliGRAM(s) Oral every 8 hours PRN Mild Pain (1 - 3)  bisacodyl 5 milliGRAM(s) Oral every 12 hours PRN Constipation  HYDROmorphone  Injectable 0.5 milliGRAM(s) IV Push every 3 hours PRN breakthru pain  magnesium citrate Oral Solution 296 milliLiter(s) Oral once PRN Constipation  magnesium hydroxide Suspension 30 milliLiter(s) Oral every 12 hours PRN Constipation  ondansetron   Disintegrating Tablet 4 milliGRAM(s) Oral every 6 hours PRN Nausea and/or Vomiting  ondansetron Injectable 4 milliGRAM(s) IV Push every 6 hours PRN Nausea  oxyCODONE    IR 10 milliGRAM(s) Oral every 4 hours PRN Moderate Pain (4 - 6)  oxyCODONE    IR 15 milliGRAM(s) Oral every 4 hours PRN Severe Pain (7 - 10)

## 2024-10-03 NOTE — DIETITIAN INITIAL EVALUATION ADULT - ADD RECOMMEND
1) Will continue to monitor PO intake, weight, labs, skin, GI status and diet 2) pt meeting increased nutritional needs on current diet ordered

## 2024-10-03 NOTE — DIETITIAN INITIAL EVALUATION ADULT - PERTINENT MEDS FT
MEDICATIONS  (STANDING):  chlorhexidine 4% Liquid 1 Application(s) Topical <User Schedule>  enoxaparin Injectable 40 milliGRAM(s) SubCutaneous every 24 hours  methocarbamol 500 milliGRAM(s) Oral every 8 hours  midodrine 10 milliGRAM(s) Oral every 8 hours  multivitamin 1 Tablet(s) Oral daily  polyethylene glycol 3350 17 Gram(s) Oral two times a day  pregabalin 50 milliGRAM(s) Oral three times a day  senna 2 Tablet(s) Oral at bedtime  voquenza  10 mg 10 milliGRAM(s) 1 Tablet(s) Oral daily    MEDICATIONS  (PRN):  acetaminophen     Tablet .. 1000 milliGRAM(s) Oral every 8 hours PRN Mild Pain (1 - 3)  bisacodyl 5 milliGRAM(s) Oral every 12 hours PRN Constipation  HYDROmorphone  Injectable 0.5 milliGRAM(s) IV Push every 3 hours PRN breakthru pain  magnesium citrate Oral Solution 296 milliLiter(s) Oral once PRN Constipation  magnesium hydroxide Suspension 30 milliLiter(s) Oral every 12 hours PRN Constipation  ondansetron   Disintegrating Tablet 4 milliGRAM(s) Oral every 6 hours PRN Nausea and/or Vomiting  ondansetron Injectable 4 milliGRAM(s) IV Push every 6 hours PRN Nausea  oxyCODONE    IR 10 milliGRAM(s) Oral every 4 hours PRN Moderate Pain (4 - 6)  oxyCODONE    IR 15 milliGRAM(s) Oral every 4 hours PRN Severe Pain (7 - 10)

## 2024-10-03 NOTE — DIETITIAN INITIAL EVALUATION ADULT - PERTINENT LABORATORY DATA
10-02    137  |  101  |  12  ----------------------------<  103[H]  4.5   |  25  |  0.75    Ca    8.1[L]      02 Oct 2024 21:52  Phos  2.9     10-02  Mg     2.0     10-02    A1C with Estimated Average Glucose Result: 6.1 % (01-29-24 @ 00:38) - no history of DM noted. level indicates pre-DM  A1C with Estimated Average Glucose Result: 6.0 % (10-07-23 @ 07:19)

## 2024-10-03 NOTE — DIETITIAN INITIAL EVALUATION ADULT - ORAL INTAKE PTA/DIET HISTORY
visited pt at bedside this morning. reports good PO intake PTA, not following a therapeutic diet. confirms NKFA.

## 2024-10-04 LAB — GLUCOSE BLDC GLUCOMTR-MCNC: 113 MG/DL — HIGH (ref 70–99)

## 2024-10-04 PROCEDURE — 99222 1ST HOSP IP/OBS MODERATE 55: CPT

## 2024-10-04 PROCEDURE — 93010 ELECTROCARDIOGRAM REPORT: CPT

## 2024-10-04 PROCEDURE — 99232 SBSQ HOSP IP/OBS MODERATE 35: CPT

## 2024-10-04 RX ORDER — FAMOTIDINE 40 MG
20 TABLET ORAL ONCE
Refills: 0 | Status: COMPLETED | OUTPATIENT
Start: 2024-10-04 | End: 2024-10-04

## 2024-10-04 RX ORDER — TAMSULOSIN HCL 0.4 MG
0.4 CAPSULE ORAL AT BEDTIME
Refills: 0 | Status: DISCONTINUED | OUTPATIENT
Start: 2024-10-04 | End: 2024-10-17

## 2024-10-04 RX ORDER — METOPROLOL TARTRATE 50 MG
5 TABLET ORAL ONCE
Refills: 0 | Status: COMPLETED | OUTPATIENT
Start: 2024-10-04 | End: 2024-10-04

## 2024-10-04 RX ADMIN — Medication 500 MILLIGRAM(S): at 12:35

## 2024-10-04 RX ADMIN — CHLORHEXIDINE GLUCONATE ORAL RINSE 1 APPLICATION(S): 1.2 SOLUTION DENTAL at 21:52

## 2024-10-04 RX ADMIN — MIDODRINE HYDROCHLORIDE 10 MILLIGRAM(S): 5 TABLET ORAL at 06:20

## 2024-10-04 RX ADMIN — Medication 1000 MILLIGRAM(S): at 04:41

## 2024-10-04 RX ADMIN — MIDODRINE HYDROCHLORIDE 10 MILLIGRAM(S): 5 TABLET ORAL at 13:32

## 2024-10-04 RX ADMIN — HYDROMORPHONE HYDROCHLORIDE 0.5 MILLIGRAM(S): 1 INJECTION, SOLUTION INTRAMUSCULAR; INTRAVENOUS; SUBCUTANEOUS at 15:07

## 2024-10-04 RX ADMIN — OXYCODONE HYDROCHLORIDE 10 MILLIGRAM(S): 30 TABLET, FILM COATED, EXTENDED RELEASE ORAL at 13:34

## 2024-10-04 RX ADMIN — OXYCODONE HYDROCHLORIDE 10 MILLIGRAM(S): 30 TABLET, FILM COATED, EXTENDED RELEASE ORAL at 02:33

## 2024-10-04 RX ADMIN — OXYCODONE HYDROCHLORIDE 10 MILLIGRAM(S): 30 TABLET, FILM COATED, EXTENDED RELEASE ORAL at 02:03

## 2024-10-04 RX ADMIN — OXYCODONE HYDROCHLORIDE 15 MILLIGRAM(S): 30 TABLET, FILM COATED, EXTENDED RELEASE ORAL at 00:10

## 2024-10-04 RX ADMIN — Medication 500 MILLIGRAM(S): at 23:01

## 2024-10-04 RX ADMIN — Medication 1000 MILLIGRAM(S): at 20:12

## 2024-10-04 RX ADMIN — Medication 1 APPLICATION(S): at 12:35

## 2024-10-04 RX ADMIN — Medication 1000 MILLIGRAM(S): at 04:11

## 2024-10-04 RX ADMIN — Medication 17 GRAM(S): at 06:20

## 2024-10-04 RX ADMIN — Medication 0.4 MILLIGRAM(S): at 21:52

## 2024-10-04 RX ADMIN — ENOXAPARIN SODIUM 40 MILLIGRAM(S): 150 INJECTION SUBCUTANEOUS at 18:24

## 2024-10-04 RX ADMIN — PREGABALIN 50 MILLIGRAM(S): 25 CAPSULE ORAL at 13:33

## 2024-10-04 RX ADMIN — MULTI VITAMIN/MINERAL SUPPLEMENT WITH ASCORBIC ACID, NIACIN, PYRIDOXINE, PANTOTHENIC ACID, FOLIC ACID, RIBOFLAVIN, THIAMIN, BIOTIN, COBALAMIN AND ZINC. 1 TABLET(S): 60; 20; 12.5; 10; 10; 1.7; 1.5; 1; .3; .006 TABLET, COATED ORAL at 12:35

## 2024-10-04 RX ADMIN — Medication 1000 MILLIGRAM(S): at 21:00

## 2024-10-04 RX ADMIN — Medication 2 TABLET(S): at 21:52

## 2024-10-04 RX ADMIN — PREGABALIN 50 MILLIGRAM(S): 25 CAPSULE ORAL at 06:20

## 2024-10-04 RX ADMIN — PREGABALIN 50 MILLIGRAM(S): 25 CAPSULE ORAL at 21:52

## 2024-10-04 RX ADMIN — HYDROMORPHONE HYDROCHLORIDE 0.5 MILLIGRAM(S): 1 INJECTION, SOLUTION INTRAMUSCULAR; INTRAVENOUS; SUBCUTANEOUS at 15:30

## 2024-10-04 RX ADMIN — Medication 5 MILLIGRAM(S): at 12:12

## 2024-10-04 RX ADMIN — Medication 17 GRAM(S): at 18:24

## 2024-10-04 RX ADMIN — OXYCODONE HYDROCHLORIDE 10 MILLIGRAM(S): 30 TABLET, FILM COATED, EXTENDED RELEASE ORAL at 14:30

## 2024-10-04 NOTE — PROGRESS NOTE ADULT - SUBJECTIVE AND OBJECTIVE BOX
Reason for Admission	s/p T9-L3 open fusion with PSO/lag screw partial corpectomy/interbody with complex plastic closure (09/29/24)      Subjective and Objective:   HOSPITAL COURSE: 74F on celecoxib for OA last took y’day otherwise no AC/AP h/o HTN, gastroparesis, peripheral neuropathy, multiple prior thoracolumbar spine surgeries for congenital scoliosis done >10 years ago out of state w/ removal of hardware 1991 presents with back pain s/p mechanical fall from sitting y'day. Describes severe mid back pain and left hip pain, no numbness, weakness, saddle anesthesia, b/b symptoms. Pain worsened with movement, axial loading. CT TL spine w/ acute nondisplaced fracture of T12 vertebral body extending into right pedicle w/o clear cord impingement.    24 hour Events:     9/29 s/p T9-L3 fusion. Pt had torn dura from injury. Placed on PCA pump.  10/01:  EUSEBIO overnight  10/02: Left RADHA off suction    PHYSICAL EXAM:    Constitutional: No Acute Distress     Neurological: Awake, alert oriented to person, place and time, Following Commands, PERRL, EOMI, No Gaze Preference, RIVERS 5/5                                                 Pulmonary: Clear to Auscultation, No rales, No rhonchi, No wheezes     Cardiovascular: S1, S2, Regular rate and rhythm     Gastrointestinal: Soft, Non-tender, Non-distended     VITALS:   Vital Signs Last 24 Hrs  T(C): 37.5 (04 Oct 2024 20:00), Max: 37.5 (04 Oct 2024 20:00)  T(F): 99.5 (04 Oct 2024 20:00), Max: 99.5 (04 Oct 2024 20:00)  HR: 94 (04 Oct 2024 20:00) (81 - 132)  BP: 112/55 (04 Oct 2024 20:00) (96/54 - 152/75)  BP(mean): 78 (04 Oct 2024 20:00) (67 - 107)  RR: 25 (04 Oct 2024 20:00) (10 - 29)  SpO2: 97% (04 Oct 2024 20:00) (95% - 99%)    Parameters below as of 04 Oct 2024 20:00  Patient On (Oxygen Delivery Method): nasal cannula  O2 Flow (L/min): 2      Drips     Respiratory:        LABS:        CAPILLARY BLOOD GLUCOSE      POCT Blood Glucose.: 113 mg/dL (04 Oct 2024 19:41)      I&O's Summary    03 Oct 2024 07:01  -  04 Oct 2024 07:00  --------------------------------------------------------  IN: 700 mL / OUT: 3690 mL / NET: -2990 mL    04 Oct 2024 07:01  -  04 Oct 2024 22:14  --------------------------------------------------------  IN: 800 mL / OUT: 2780 mL / NET: -1980 mL        Imaging   CXR     EKG     MEDICATION LEVELS:     IVF FLUIDS/MEDICATIONS:   MEDICATIONS  (STANDING):  calamine/zinc oxide Lotion 1 Application(s) Topical daily  chlorhexidine 4% Liquid 1 Application(s) Topical <User Schedule>  enoxaparin Injectable 40 milliGRAM(s) SubCutaneous every 24 hours  methocarbamol 500 milliGRAM(s) Oral every 8 hours  midodrine 10 milliGRAM(s) Oral every 8 hours  multivitamin 1 Tablet(s) Oral daily  polyethylene glycol 3350 17 Gram(s) Oral two times a day  pregabalin 50 milliGRAM(s) Oral three times a day  senna 2 Tablet(s) Oral at bedtime  tamsulosin 0.4 milliGRAM(s) Oral at bedtime  voquenza  10 mg 10 milliGRAM(s) 1 Tablet(s) Oral daily    MEDICATIONS  (PRN):  acetaminophen     Tablet .. 1000 milliGRAM(s) Oral every 8 hours PRN Mild Pain (1 - 3)  bisacodyl 5 milliGRAM(s) Oral every 12 hours PRN Constipation  HYDROmorphone  Injectable 0.5 milliGRAM(s) IV Push every 3 hours PRN breakthru pain  magnesium citrate Oral Solution 296 milliLiter(s) Oral once PRN Constipation  magnesium hydroxide Suspension 30 milliLiter(s) Oral every 12 hours PRN Constipation  ondansetron   Disintegrating Tablet 4 milliGRAM(s) Oral every 6 hours PRN Nausea and/or Vomiting  ondansetron Injectable 4 milliGRAM(s) IV Push every 6 hours PRN Nausea  oxyCODONE    IR 10 milliGRAM(s) Oral every 4 hours PRN Moderate Pain (4 - 6)  oxyCODONE    IR 15 milliGRAM(s) Oral every 4 hours PRN Severe Pain (7 - 10)

## 2024-10-04 NOTE — PROVIDER CONTACT NOTE (CHANGE IN STATUS NOTIFICATION) - SITUATION
pt cardiac rhythm changed from NSR to afib. pt complains of feeling loopy and surgical incision dressing is saturated with yellow/red drainage

## 2024-10-04 NOTE — PROGRESS NOTE ADULT - ASSESSMENT
acute nondisplaced fracture of T12 veterbral body extending into right pedicle w/o clear cord impingement.  9/29 s/p T9-L3 fusion. Pt had torn dura from injury.    neuro checks q 4 hr with protected sleep   patient tolerating being being flat   oxy, hydromorphine PRN , pain team on consult   pregabalin 50 mg q 8 hr   has  2 drains superficial drain 135 cc off suction , right deep 15 ml   RA , IS   MAP goals > 65 mmhg, on midodrine 10 mg q 8 hr, TTE EF normal   IVL, HD stable   last BM: 10/03  lovenox 40 mg sc qhs        not critical     No change from attending's day plan.     No change from attending's day plan.

## 2024-10-04 NOTE — PROVIDER CONTACT NOTE (CHANGE IN STATUS NOTIFICATION) - RECOMMENDATIONS
provider to come to bedside to evaluate pt's mental status and surgical incision. EKG to be ordered.

## 2024-10-04 NOTE — PROVIDER CONTACT NOTE (CHANGE IN STATUS NOTIFICATION) - ACTION/TREATMENT ORDERED:
EKG done, 5mG lopressor IVP given- pt in NSR post lopressor.   MD Leigh and team at bedside and drained R RADHA drain and redressed dressing. as per team, continue to monitor dressing

## 2024-10-04 NOTE — CONSULT NOTE ADULT - ASSESSMENT
74y old  Female PMhx of HLD. OA. congenital scoliosis s/p prior thoracolumbar spine surgeries. S/p mechanical fall  with fracture of T12 s/p T9-L3 fusion   s/p one episode of post op afib brief <24h, treated with lopressor IV which broke the afib back to sinus.  No history of afib, chf, dm cva, vascular dz    - Keep on tele  - hx of normal EF, please obtain echo TTE  - Lopressor 5 iv prn afib  - Bp 96-140s may initiate lopressor 12.5 mg BID if BP elevated off midodrine  - Brief Afib post spinal surg. even CHASDS VAS is 3 (for age and sex) +/- htn, given brief episode would treat with ASA 81 if cleared by neurosurgery. if episodes of afib returns will re evaluate need for full AC.  - K>4 Mg >2. euvolemic fluid status.  - will follow, please call of additional episodes of afib    Maryam Greco MD  Cardiology Attending  Contact on TEAMS  [facility]    For day time coverage Mon-Fri see Non-Service Consult Attending on [email or web address], password: [email or web address]; daytime weekends covered by general cardiology consult service attending.)

## 2024-10-04 NOTE — PROGRESS NOTE ADULT - SUBJECTIVE AND OBJECTIVE BOX
Patient seen and examined at bedside.    --Anticoagulation--  enoxaparin Injectable 40 milliGRAM(s) SubCutaneous every 24 hours    T(C): 36.2 (10-03-24 @ 19:00), Max: 37.6 (10-03-24 @ 03:00)  HR: 93 (10-03-24 @ 19:00) (82 - 95)  BP: 136/71 (10-03-24 @ 19:00) (106/52 - 142/60)  RR: 23 (10-03-24 @ 19:00) (12 - 23)  SpO2: 95% (10-03-24 @ 19:00) (93% - 99%)  Wt(kg): --    Exam: AOx3, PERRL, EOMI, FC, RIVERS 5/5

## 2024-10-04 NOTE — PROGRESS NOTE ADULT - SUBJECTIVE AND OBJECTIVE BOX
Reason for Admission	s/p T9-L3 open fusion with PSO/lag screw partial corpectomy/interbody with complex plastic closure (09/29/24)      Subjective and Objective:   HOSPITAL COURSE: 74F on celecoxib for OA last took y’day otherwise no AC/AP h/o HTN, gastroparesis, peripheral neuropathy, multiple prior thoracolumbar spine surgeries for congenital scoliosis done >10 years ago out of state w/ removal of hardware 1991 presents with back pain s/p mechanical fall from sitting y'day. Describes severe mid back pain and left hip pain, no numbness, weakness, saddle anesthesia, b/b symptoms. Pain worsened with movement, axial loading. CT TL spine w/ acute nondisplaced fracture of T12 vertebral body extending into right pedicle w/o clear cord impingement.    24 hour Events:     9/29 s/p T9-L3 fusion. Pt had torn dura from injury. Placed on PCA pump.  10/01:  EUSEBIO overnight  10/02: Left RADHA off suction    PHYSICAL EXAM:    Constitutional: No Acute Distress     Neurological: Awake, alert oriented to person, place and time, Following Commands, PERRL, EOMI, No Gaze Preference, RIVERS 5/5                                                 Pulmonary: Clear to Auscultation, No rales, No rhonchi, No wheezes     Cardiovascular: S1, S2, Regular rate and rhythm     Gastrointestinal: Soft, Non-tender, Non-distended     ICU Vital Signs Last 24 Hrs  T(C): 36.9 (04 Oct 2024 07:24), Max: 37.1 (03 Oct 2024 15:00)  T(F): 98.5 (04 Oct 2024 07:24), Max: 98.7 (03 Oct 2024 15:00)  HR: 92 (04 Oct 2024 07:24) (82 - 95)  BP: 152/75 (04 Oct 2024 07:24) (106/52 - 152/75)  BP(mean): 107 (04 Oct 2024 07:24) (75 - 107)  ABP: --  ABP(mean): --  RR: 22 (04 Oct 2024 07:24) (12 - 23)  SpO2: 95% (04 Oct 2024 07:24) (95% - 99%)    O2 Parameters below as of 04 Oct 2024 07:24  Patient On (Oxygen Delivery Method): nasal cannula  O2 Flow (L/min): 2                          9.5    9.07  )-----------( 201      ( 02 Oct 2024 21:52 )             30.3   10-02    137  |  101  |  12  ----------------------------<  103[H]  4.5   |  25  |  0.75    Ca    8.1[L]      02 Oct 2024 21:52  Phos  2.9     10-02  Mg     2.0     10-02      I&O's Summary    03 Oct 2024 07:01  -  04 Oct 2024 07:00  --------------------------------------------------------  IN: 700 mL / OUT: 3690 mL / NET: -2990 mL            Imaging   CXR     EKG     MEDICATION LEVELS:     IVF FLUIDS/MEDICATIONS:   MEDICATIONS  (STANDING):  chlorhexidine 4% Liquid 1 Application(s) Topical <User Schedule>  enoxaparin Injectable 40 milliGRAM(s) SubCutaneous every 24 hours  methocarbamol 500 milliGRAM(s) Oral every 8 hours  midodrine 10 milliGRAM(s) Oral every 8 hours  multivitamin 1 Tablet(s) Oral daily  polyethylene glycol 3350 17 Gram(s) Oral every 24 hours  pregabalin 50 milliGRAM(s) Oral three times a day  senna 2 Tablet(s) Oral at bedtime  voquenza  10 mg 10 milliGRAM(s) 1 Tablet(s) Oral daily    MEDICATIONS  (PRN):  acetaminophen     Tablet .. 1000 milliGRAM(s) Oral every 8 hours PRN Mild Pain (1 - 3)  bisacodyl 5 milliGRAM(s) Oral every 12 hours PRN Constipation  HYDROmorphone  Injectable 0.5 milliGRAM(s) IV Push every 3 hours PRN breakthru pain  magnesium citrate Oral Solution 296 milliLiter(s) Oral once PRN Constipation  magnesium hydroxide Suspension 30 milliLiter(s) Oral every 12 hours PRN Constipation  ondansetron   Disintegrating Tablet 4 milliGRAM(s) Oral every 6 hours PRN Nausea and/or Vomiting  ondansetron Injectable 4 milliGRAM(s) IV Push every 6 hours PRN Nausea  oxyCODONE    IR 10 milliGRAM(s) Oral every 4 hours PRN Moderate Pain (4 - 6)  oxyCODONE    IR 15 milliGRAM(s) Oral every 4 hours PRN Severe Pain (7 - 10)

## 2024-10-04 NOTE — PROVIDER CONTACT NOTE (CHANGE IN STATUS NOTIFICATION) - ASSESSMENT
HR sustaining 120s/130s and appears afib. pt's AxOx4, however complains of feeling loopy and spontaneously speaks with one or both eyes closed. pt's pupils 2RB and has no drift. pt's surgical incision dressing saturated

## 2024-10-04 NOTE — CONSULT NOTE ADULT - SUBJECTIVE AND OBJECTIVE BOX
74y old  Female PMhx of HLD, HTN. OA. congenital scoliosis s/p prior thoracolumbar spine surgeries. S/p mechanical fall  with fracture of T12 s/p T9-L3 fusion   s/p one episode of post op afib treated with lopressor IV which broke the afib back to sinus..    CVS: Denies palpitations Chest pain;  Neuro: Denies dizziness; Pulm: denies dyspnea, cough  Pt seen at bedside with family.    OP cardiology Dr. Jonathan Copeland Sees for risk factor modifications       Allergies  penicillins (Urticaria)  Dilantin (Urticaria)    Intolerances  erythromycin (Stomach Upset; Vomiting; Nausea)  	    PAST MEDICAL & SURGICAL HISTORY:  H/O seasonal allergies    History of pulmonary embolism  developed after billings abigail surgery, treated with coumadin 3 months, no issues after  Restless leg syndrome  GERD (gastroesophageal reflux disease)  OA (osteoarthritis)  H/O scoliosis  Essential hypertension  Osteoporosis  Hiatal hernia  History of chronic pain  Peripheral neuropathy  S/P repair of paraesophageal hernia    S/P spinal fusion  L4-L5 1966  Scoliosis  s/p placement of billings abigail x 2 1984  S/P spinal surgery  x 2 1985, 1986    Removal of pin, plate, abigail, or screw  1991- removal of billings abigail  S/P hysterectomy  1982  S/P hip replacement  left ()  S/P shoulder surgery  right ()  S/P dlatation and curettage      H/O arthroscopy of knee  2021  S/P cataract surgery  History of bilateral hip replacements          FAMILY HISTORY:  Family history of abdominal aortic aneurysm (AAA) (Mother)      REVIEW OF SYSTEMS:  CONSTITUTIONAL: No fever, weight loss, or fatigue  EYES: No eye pain, visual disturbances, or discharge  ENMT:  No difficulty hearing, tinnitus, vertigo; No sinus or throat pain  RESPIRATORY: No cough, wheezing, chills or hemoptysis; No Shortness of Breath  CARDIOVASCULAR: No chest pain, palpitations, passing out, dizziness, or leg swelling  GASTROINTESTINAL: No abdominal or epigastric pain. No nausea, vomiting, or hematemesis; No diarrhea or constipation. No melena or hematochezia.  NEUROLOGICAL: No headaches, memory loss, loss of strength, numbness, or tremors  MUSCULOSKELETAL: back pain per neuro   PSYCHIATRIC: No depression, anxiety, mood swings, or difficulty sleeping        I&O's Summary    03 Oct 2024 07:01  -  04 Oct 2024 07:00  --------------------------------------------------------  IN: 700 mL / OUT: 3690 mL / NET: -2990 mL    04 Oct 2024 07:01  -  04 Oct 2024 18:02  --------------------------------------------------------  IN: 600 mL / OUT: 2055 mL / NET: -1455 mL        PHYSICAL EXAM:  Vital Signs Last 24 Hrs  T(C): 36.7 (04 Oct 2024 16:00), Max: 36.9 (04 Oct 2024 07:24)  T(F): 98.1 (04 Oct 2024 16:00), Max: 98.5 (04 Oct 2024 07:24)  HR: 87 (04 Oct 2024 16:00) (81 - 132)  BP: 143/58 (04 Oct 2024 16:00) (96/54 - 152/75)  BP(mean): 84 (04 Oct 2024 16:00) (67 - 107)  RR: 24 (04 Oct 2024 16:00) (10 - 29)  SpO2: 97% (04 Oct 2024 16:00) (95% - 99%)    Parameters below as of 04 Oct 2024 07:24  Patient On (Oxygen Delivery Method): nasal cannula  O2 Flow (L/min): 2    Appearance: Normal	  HEENT:   Normal oral mucosa, PERRL, EOMI	  Lymphatic: No lymphadenopathy  Cardiovascular: Normal S1 S2, No JVD, No murmurs, No edema  Respiratory: Lungs clear to auscultation	  Psychiatry: A & O x 3, Mood & affect appropriate  Gastrointestinal:  Soft, Non-tender, + BS	  Skin: No rashes, No ecchymoses, No cyanosis	  Neurologic: Non-focal  Extremities: Normal range of motion, No clubbing, cyanosis or edema  Vascular: Peripheral pulses palpable 2+ bilaterally    MEDICATIONS:  MEDICATIONS  (STANDING):  calamine/zinc oxide Lotion 1 Application(s) Topical daily  chlorhexidine 4% Liquid 1 Application(s) Topical <User Schedule>  enoxaparin Injectable 40 milliGRAM(s) SubCutaneous every 24 hours  methocarbamol 500 milliGRAM(s) Oral every 8 hours  midodrine 10 milliGRAM(s) Oral every 8 hours  multivitamin 1 Tablet(s) Oral daily  polyethylene glycol 3350 17 Gram(s) Oral two times a day  pregabalin 50 milliGRAM(s) Oral three times a day  senna 2 Tablet(s) Oral at bedtime  tamsulosin 0.4 milliGRAM(s) Oral at bedtime  voquenza  10 mg 10 milliGRAM(s) 1 Tablet(s) Oral daily    MEDICATIONS  (PRN):  acetaminophen     Tablet .. 1000 milliGRAM(s) Oral every 8 hours PRN Mild Pain (1 - 3)  bisacodyl 5 milliGRAM(s) Oral every 12 hours PRN Constipation  HYDROmorphone  Injectable 0.5 milliGRAM(s) IV Push every 3 hours PRN breakthru pain  magnesium citrate Oral Solution 296 milliLiter(s) Oral once PRN Constipation  magnesium hydroxide Suspension 30 milliLiter(s) Oral every 12 hours PRN Constipation  ondansetron   Disintegrating Tablet 4 milliGRAM(s) Oral every 6 hours PRN Nausea and/or Vomiting  ondansetron Injectable 4 milliGRAM(s) IV Push every 6 hours PRN Nausea  oxyCODONE    IR 10 milliGRAM(s) Oral every 4 hours PRN Moderate Pain (4 - 6)  oxyCODONE    IR 15 milliGRAM(s) Oral every 4 hours PRN Severe Pain (7 - 10)      Home Medications:  atorvastatin 40 mg oral tablet: 1 tab(s) orally once a day (at bedtime) (30 Sep 2024 12:30)  celecoxib 100 mg oral capsule: 1 cap(s) orally 2 times a day (30 Sep 2024 12:30)  chlorthalidone 25 mg oral tablet: 1 tab(s) orally once a day (30 Sep 2024 12:30)  diclofenac 1% topical gel: Apply topically to affected area 4 times a day (30 Sep 2024 12:29)  DULoxetine 20 mg oral delayed release capsule: 1 cap(s) orally (30 Sep 2024 12:30)  ergocalciferol 1.25 mg (50,000 intl units) oral capsule: 1 cap(s) orally once a week (01 Oct 2024 15:02)  Glucosamine Chondroitin oral capsule: 3 cap(s) orally once a day (30 Sep 2024 12:30)  losartan 100 mg oral tablet: 1 tab(s) orally once a day (30 Sep 2024 12:30)  metoprolol succinate 25 mg oral capsule, extended release: 1 cap(s) orally once a day (01 Oct 2024 15:03)  montelukast 10 mg oral tablet: 1 tab(s) orally once a day (01 Oct 2024 15:02)  pantoprazole 40 mg oral granule, delayed release: 1 each orally once a day (30 Sep 2024 12:30)  pramipexole 1.5 mg oral tablet, extended release: 1 tab(s) orally 2 times a day (30 Sep 2024 12:27)  Prolia 60 mg/mL subcutaneous solution: 60 milligram(s) subcutaneously every 6 months (30 Sep 2024 12:30)  Voquenza 10m tablet daily (01 Oct 2024 08:11)      LABS:	 	  CBC Full  -  ( 02 Oct 2024 21:52 )  WBC Count : 9.07 K/uL  Hemoglobin : 9.5 g/dL  Hematocrit : 30.3 %  Platelet Count - Automated : 201 K/uL  Mean Cell Volume : 90.4 fl  Mean Cell Hemoglobin : 28.4 pg  Mean Cell Hemoglobin Concentration : 31.4 gm/dL  A  10-02    137  |  101  |  12  ----------------------------<  103[H]  4.5   |  25  |  0.75    Ca    8.1[L]      02 Oct 2024 21:52  Phos  2.9     10-02  Mg     2.0     10-02

## 2024-10-05 LAB
ANION GAP SERPL CALC-SCNC: 10 MMOL/L — SIGNIFICANT CHANGE UP (ref 5–17)
BUN SERPL-MCNC: 19 MG/DL — SIGNIFICANT CHANGE UP (ref 7–23)
CALCIUM SERPL-MCNC: 8.3 MG/DL — LOW (ref 8.4–10.5)
CHLORIDE SERPL-SCNC: 99 MMOL/L — SIGNIFICANT CHANGE UP (ref 96–108)
CO2 SERPL-SCNC: 28 MMOL/L — SIGNIFICANT CHANGE UP (ref 22–31)
CREAT SERPL-MCNC: 0.82 MG/DL — SIGNIFICANT CHANGE UP (ref 0.5–1.3)
EGFR: 74 ML/MIN/1.73M2 — SIGNIFICANT CHANGE UP
GLUCOSE SERPL-MCNC: 111 MG/DL — HIGH (ref 70–99)
HCT VFR BLD CALC: 28.7 % — LOW (ref 34.5–45)
HGB BLD-MCNC: 8.9 G/DL — LOW (ref 11.5–15.5)
MAGNESIUM SERPL-MCNC: 2.2 MG/DL — SIGNIFICANT CHANGE UP (ref 1.6–2.6)
MCHC RBC-ENTMCNC: 28.2 PG — SIGNIFICANT CHANGE UP (ref 27–34)
MCHC RBC-ENTMCNC: 31 GM/DL — LOW (ref 32–36)
MCV RBC AUTO: 90.8 FL — SIGNIFICANT CHANGE UP (ref 80–100)
NRBC # BLD: 0 /100 WBCS — SIGNIFICANT CHANGE UP (ref 0–0)
PHOSPHATE SERPL-MCNC: 2.4 MG/DL — LOW (ref 2.5–4.5)
PLATELET # BLD AUTO: 275 K/UL — SIGNIFICANT CHANGE UP (ref 150–400)
POTASSIUM SERPL-MCNC: 4.4 MMOL/L — SIGNIFICANT CHANGE UP (ref 3.5–5.3)
POTASSIUM SERPL-SCNC: 4.4 MMOL/L — SIGNIFICANT CHANGE UP (ref 3.5–5.3)
RBC # BLD: 3.16 M/UL — LOW (ref 3.8–5.2)
RBC # FLD: 15 % — HIGH (ref 10.3–14.5)
SODIUM SERPL-SCNC: 137 MMOL/L — SIGNIFICANT CHANGE UP (ref 135–145)
WBC # BLD: 7.38 K/UL — SIGNIFICANT CHANGE UP (ref 3.8–10.5)
WBC # FLD AUTO: 7.38 K/UL — SIGNIFICANT CHANGE UP (ref 3.8–10.5)

## 2024-10-05 PROCEDURE — 93010 ELECTROCARDIOGRAM REPORT: CPT

## 2024-10-05 PROCEDURE — 72131 CT LUMBAR SPINE W/O DYE: CPT | Mod: 26

## 2024-10-05 PROCEDURE — 72128 CT CHEST SPINE W/O DYE: CPT | Mod: 26

## 2024-10-05 PROCEDURE — 99231 SBSQ HOSP IP/OBS SF/LOW 25: CPT

## 2024-10-05 RX ORDER — METOPROLOL TARTRATE 50 MG
12.5 TABLET ORAL
Refills: 0 | Status: DISCONTINUED | OUTPATIENT
Start: 2024-10-05 | End: 2024-10-08

## 2024-10-05 RX ORDER — BISACODYL 5 MG/1
10 TABLET, COATED ORAL DAILY
Refills: 0 | Status: DISCONTINUED | OUTPATIENT
Start: 2024-10-05 | End: 2024-10-10

## 2024-10-05 RX ORDER — SODIUM CHLORIDE 0.65 %
1 AEROSOL, SPRAY (ML) NASAL
Refills: 0 | Status: DISCONTINUED | OUTPATIENT
Start: 2024-10-05 | End: 2024-10-17

## 2024-10-05 RX ORDER — METOPROLOL TARTRATE 50 MG
5 TABLET ORAL ONCE
Refills: 0 | Status: COMPLETED | OUTPATIENT
Start: 2024-10-05 | End: 2024-10-05

## 2024-10-05 RX ORDER — SODIUM PHOSPHATE IN D5W 15MMOL/250
30 PLASTIC BAG, INJECTION (ML) INTRAVENOUS ONCE
Refills: 0 | Status: COMPLETED | OUTPATIENT
Start: 2024-10-05 | End: 2024-10-05

## 2024-10-05 RX ORDER — DIPHENHYDRAMINE HCL 12.5MG/5ML
25 LIQUID (ML) ORAL EVERY 8 HOURS
Refills: 0 | Status: DISCONTINUED | OUTPATIENT
Start: 2024-10-05 | End: 2024-10-11

## 2024-10-05 RX ADMIN — Medication 500 MILLIGRAM(S): at 21:12

## 2024-10-05 RX ADMIN — OXYCODONE HYDROCHLORIDE 10 MILLIGRAM(S): 30 TABLET, FILM COATED, EXTENDED RELEASE ORAL at 07:57

## 2024-10-05 RX ADMIN — Medication 25 MILLIGRAM(S): at 23:45

## 2024-10-05 RX ADMIN — MIDODRINE HYDROCHLORIDE 10 MILLIGRAM(S): 5 TABLET ORAL at 14:24

## 2024-10-05 RX ADMIN — HYDROMORPHONE HYDROCHLORIDE 0.5 MILLIGRAM(S): 1 INJECTION, SOLUTION INTRAMUSCULAR; INTRAVENOUS; SUBCUTANEOUS at 20:42

## 2024-10-05 RX ADMIN — PREGABALIN 50 MILLIGRAM(S): 25 CAPSULE ORAL at 21:12

## 2024-10-05 RX ADMIN — HYDROMORPHONE HYDROCHLORIDE 0.5 MILLIGRAM(S): 1 INJECTION, SOLUTION INTRAMUSCULAR; INTRAVENOUS; SUBCUTANEOUS at 05:00

## 2024-10-05 RX ADMIN — HYDROMORPHONE HYDROCHLORIDE 0.5 MILLIGRAM(S): 1 INJECTION, SOLUTION INTRAMUSCULAR; INTRAVENOUS; SUBCUTANEOUS at 04:38

## 2024-10-05 RX ADMIN — OXYCODONE HYDROCHLORIDE 10 MILLIGRAM(S): 30 TABLET, FILM COATED, EXTENDED RELEASE ORAL at 21:12

## 2024-10-05 RX ADMIN — Medication 1000 MILLIGRAM(S): at 17:16

## 2024-10-05 RX ADMIN — PREGABALIN 50 MILLIGRAM(S): 25 CAPSULE ORAL at 05:08

## 2024-10-05 RX ADMIN — PREGABALIN 50 MILLIGRAM(S): 25 CAPSULE ORAL at 14:24

## 2024-10-05 RX ADMIN — Medication 17 GRAM(S): at 05:08

## 2024-10-05 RX ADMIN — Medication 25 MILLIGRAM(S): at 10:30

## 2024-10-05 RX ADMIN — Medication 1000 MILLIGRAM(S): at 17:46

## 2024-10-05 RX ADMIN — MULTI VITAMIN/MINERAL SUPPLEMENT WITH ASCORBIC ACID, NIACIN, PYRIDOXINE, PANTOTHENIC ACID, FOLIC ACID, RIBOFLAVIN, THIAMIN, BIOTIN, COBALAMIN AND ZINC. 1 TABLET(S): 60; 20; 12.5; 10; 10; 1.7; 1.5; 1; .3; .006 TABLET, COATED ORAL at 14:23

## 2024-10-05 RX ADMIN — OXYCODONE HYDROCHLORIDE 10 MILLIGRAM(S): 30 TABLET, FILM COATED, EXTENDED RELEASE ORAL at 07:27

## 2024-10-05 RX ADMIN — OXYCODONE HYDROCHLORIDE 10 MILLIGRAM(S): 30 TABLET, FILM COATED, EXTENDED RELEASE ORAL at 01:10

## 2024-10-05 RX ADMIN — Medication 1 SPRAY(S): at 19:35

## 2024-10-05 RX ADMIN — Medication 12.5 MILLIGRAM(S): at 17:17

## 2024-10-05 RX ADMIN — Medication 1 APPLICATION(S): at 12:23

## 2024-10-05 RX ADMIN — ENOXAPARIN SODIUM 40 MILLIGRAM(S): 150 INJECTION SUBCUTANEOUS at 17:17

## 2024-10-05 RX ADMIN — Medication 500 MILLIGRAM(S): at 14:23

## 2024-10-05 RX ADMIN — Medication 296 MILLILITER(S): at 21:15

## 2024-10-05 RX ADMIN — Medication 500 MILLIGRAM(S): at 05:08

## 2024-10-05 RX ADMIN — Medication 5 MILLIGRAM(S): at 15:51

## 2024-10-05 RX ADMIN — CHLORHEXIDINE GLUCONATE ORAL RINSE 1 APPLICATION(S): 1.2 SOLUTION DENTAL at 21:14

## 2024-10-05 RX ADMIN — MIDODRINE HYDROCHLORIDE 10 MILLIGRAM(S): 5 TABLET ORAL at 21:13

## 2024-10-05 RX ADMIN — Medication 2 TABLET(S): at 21:12

## 2024-10-05 RX ADMIN — Medication 85 MILLIMOLE(S): at 07:47

## 2024-10-05 RX ADMIN — OXYCODONE HYDROCHLORIDE 10 MILLIGRAM(S): 30 TABLET, FILM COATED, EXTENDED RELEASE ORAL at 00:17

## 2024-10-05 RX ADMIN — HYDROMORPHONE HYDROCHLORIDE 0.5 MILLIGRAM(S): 1 INJECTION, SOLUTION INTRAMUSCULAR; INTRAVENOUS; SUBCUTANEOUS at 19:35

## 2024-10-05 RX ADMIN — BISACODYL 10 MILLIGRAM(S): 5 TABLET, COATED ORAL at 22:37

## 2024-10-05 RX ADMIN — OXYCODONE HYDROCHLORIDE 10 MILLIGRAM(S): 30 TABLET, FILM COATED, EXTENDED RELEASE ORAL at 21:30

## 2024-10-05 RX ADMIN — Medication 0.4 MILLIGRAM(S): at 21:12

## 2024-10-05 NOTE — PROGRESS NOTE ADULT - SUBJECTIVE AND OBJECTIVE BOX
Reason for Admission	s/p T9-L3 open fusion with PSO/lag screw partial corpectomy/interbody with complex plastic closure (09/29/24)      Subjective and Objective:   HOSPITAL COURSE: 74F on celecoxib for OA last took y’day otherwise no AC/AP h/o HTN, gastroparesis, peripheral neuropathy, multiple prior thoracolumbar spine surgeries for congenital scoliosis done >[place] of state w/ removal of hardware 1991 presents with back pain s/p mechanical fall from sitting y'day. Describes severe mid back pain and left hip pain, no numbness, weakness, saddle anesthesia, b/b symptoms. Pain worsened with movement, axial loading. CT TL spine w/ acute nondisplaced fracture of T12 vertebral body extending into right pedicle w/o clear cord impingement.    24 hour Events:     9/29 s/p T9-L3 fusion. Pt had torn dura from injury. Placed on PCA pump.  10/01:  EUSEBIO overnight  10/02: Left RADHA off suction    PHYSICAL EXAM:    Constitutional: No Acute Distress     Neurological: Awake, alert oriented to person, place and time, Following Commands, PERRL, EOMI, No Gaze Preference, RIVERS 5/5                                                 Pulmonary: Clear to Auscultation, No rales, No rhonchi, No wheezes     Cardiovascular: S1, S2, Regular rate and rhythm     Gastrointestinal: Soft, Non-tender, Non-distended     ICU Vital Signs Last 24 Hrs  T(C): 37.2 (05 Oct 2024 04:00), Max: 37.5 (04 Oct 2024 20:00)  T(F): 99 (05 Oct 2024 04:00), Max: 99.5 (04 Oct 2024 20:00)  HR: 90 (05 Oct 2024 04:00) (81 - 132)  BP: 147/72 (05 Oct 2024 04:00) (96/54 - 152/75)  BP(mean): 102 (05 Oct 2024 04:00) (67 - 107)  ABP: --  ABP(mean): --  RR: 18 (05 Oct 2024 04:00) (10 - 29)  SpO2: 96% (05 Oct 2024 04:00) (95% - 99%)    O2 Parameters below as of 04 Oct 2024 20:00  Patient On (Oxygen Delivery Method): nasal cannula  O2 Flow (L/min): 2                              9.5    9.07  )-----------( 201      ( 02 Oct 2024 21:52 )             30.3   10-02    137  |  101  |  12  ----------------------------<  103[H]  4.5   |  25  |  0.75    Ca    8.1[L]      02 Oct 2024 21:52  Phos  2.9     10-02  Mg     2.0     10-02      I&O's Summary    03 Oct 2024 07:01  -  04 Oct 2024 07:00  --------------------------------------------------------  IN: 700 mL / OUT: 3690 mL / NET: -2990 mL            Imaging   CXR     EKG     MEDICATION LEVELS:     IVF FLUIDS/MEDICATIONS:   MEDICATIONS  (STANDING):  chlorhexidine 4% Liquid 1 Application(s) Topical <User Schedule>  enoxaparin Injectable 40 milliGRAM(s) SubCutaneous every 24 hours  methocarbamol 500 milliGRAM(s) Oral every 8 hours  midodrine 10 milliGRAM(s) Oral every 8 hours  multivitamin 1 Tablet(s) Oral daily  polyethylene glycol 3350 17 Gram(s) Oral every 24 hours  pregabalin 50 milliGRAM(s) Oral three times a day  senna 2 Tablet(s) Oral at bedtime  voquenza  10 mg 10 milliGRAM(s) 1 Tablet(s) Oral daily    MEDICATIONS  (PRN):  acetaminophen     Tablet .. 1000 milliGRAM(s) Oral every 8 hours PRN Mild Pain (1 - 3)  bisacodyl 5 milliGRAM(s) Oral every 12 hours PRN Constipation  HYDROmorphone  Injectable 0.5 milliGRAM(s) IV Push every 3 hours PRN breakthru pain  magnesium citrate Oral Solution 296 milliLiter(s) Oral once PRN Constipation  magnesium hydroxide Suspension 30 milliLiter(s) Oral every 12 hours PRN Constipation  ondansetron   Disintegrating Tablet 4 milliGRAM(s) Oral every 6 hours PRN Nausea and/or Vomiting  ondansetron Injectable 4 milliGRAM(s) IV Push every 6 hours PRN Nausea  oxyCODONE    IR 10 milliGRAM(s) Oral every 4 hours PRN Moderate Pain (4 - 6)  oxyCODONE    IR 15 milliGRAM(s) Oral every 4 hours PRN Severe Pain (7 - 10)

## 2024-10-05 NOTE — PROGRESS NOTE ADULT - SUBJECTIVE AND OBJECTIVE BOX
Reason for Admission	s/p T9-L3 open fusion with PSO/lag screw partial corpectomy/interbody with complex plastic closure (09/29/24)      Subjective and Objective:   HOSPITAL COURSE: 74F on celecoxib for OA last took y’day otherwise no AC/AP h/o HTN, gastroparesis, peripheral neuropathy, multiple prior thoracolumbar spine surgeries for congenital scoliosis done >[place] years ago out of state w/ removal of hardware 1991 presents with back pain s/p mechanical fall from sitting y'day. Describes severe mid back pain and left hip pain, no numbness, weakness, saddle anesthesia, b/b symptoms. Pain worsened with movement, axial loading. CT TL spine w/ acute nondisplaced fracture of T12 vertebral body extending into right pedicle w/o clear cord impingement.    24 hour Events:     stable    PHYSICAL EXAM:    Constitutional: No Acute Distress     Neurological: Awake, alert oriented to person, place and time, Following Commands, PERRL, EOMI, No Gaze Preference, RIVERS 5/5                                                 Pulmonary: Clear to Auscultation, No rales, No rhonchi, No wheezes     Cardiovascular: S1, S2, Regular rate and rhythm     Gastrointestinal: Soft, Non-tender, Non-distended     VITALS:   Vital Signs Last 24 Hrs  T(C): 37.4 (05 Oct 2024 20:00), Max: 37.4 (05 Oct 2024 20:00)  T(F): 99.3 (05 Oct 2024 20:00), Max: 99.3 (05 Oct 2024 20:00)  HR: 97 (05 Oct 2024 20:00) (90 - 142)  BP: 135/77 (05 Oct 2024 20:00) (117/58 - 154/75)  BP(mean): 97 (05 Oct 2024 20:00) (83 - 107)  RR: 21 (05 Oct 2024 20:00) (18 - 30)  SpO2: 97% (05 Oct 2024 20:00) (85% - 98%)    Parameters below as of 05 Oct 2024 20:00  Patient On (Oxygen Delivery Method): nasal cannula  O2 Flow (L/min): 2      Drips     Respiratory:        LABS:                        8.9    7.38  )-----------( 275      ( 05 Oct 2024 00:16 )             28.7     10-05    137  |  99  |  19  ----------------------------<  111[H]  4.4   |  28  |  0.82      CAPILLARY BLOOD GLUCOSE          I&O's Summary    04 Oct 2024 07:01  -  05 Oct 2024 07:00  --------------------------------------------------------  IN: 1400 mL / OUT: 5228 mL / NET: -3828 mL    05 Oct 2024 07:01  -  05 Oct 2024 22:41  --------------------------------------------------------  IN: 1389.8 mL / OUT: 2585 mL / NET: -1195.2 mL        Imaging   CXR     EKG     MEDICATION LEVELS:     IVF FLUIDS/MEDICATIONS:   MEDICATIONS  (STANDING):  calamine/zinc oxide Lotion 1 Application(s) Topical daily  chlorhexidine 4% Liquid 1 Application(s) Topical <User Schedule>  enoxaparin Injectable 40 milliGRAM(s) SubCutaneous every 24 hours  methocarbamol 500 milliGRAM(s) Oral every 8 hours  metoprolol tartrate 12.5 milliGRAM(s) Oral two times a day  midodrine 10 milliGRAM(s) Oral every 8 hours  multivitamin 1 Tablet(s) Oral daily  polyethylene glycol 3350 17 Gram(s) Oral two times a day  pregabalin 50 milliGRAM(s) Oral three times a day  senna 2 Tablet(s) Oral at bedtime  tamsulosin 0.4 milliGRAM(s) Oral at bedtime  voquenza  10 mg 10 milliGRAM(s) 1 Tablet(s) Oral daily    MEDICATIONS  (PRN):  acetaminophen     Tablet .. 1000 milliGRAM(s) Oral every 8 hours PRN Mild Pain (1 - 3)  bisacodyl Suppository 10 milliGRAM(s) Rectal daily PRN Constipation  diphenhydrAMINE 25 milliGRAM(s) Oral every 8 hours PRN Rash and/or Itching  HYDROmorphone  Injectable 0.5 milliGRAM(s) IV Push every 3 hours PRN breakthru pain  magnesium hydroxide Suspension 30 milliLiter(s) Oral every 12 hours PRN Constipation  ondansetron   Disintegrating Tablet 4 milliGRAM(s) Oral every 6 hours PRN Nausea and/or Vomiting  ondansetron Injectable 4 milliGRAM(s) IV Push every 6 hours PRN Nausea  oxyCODONE    IR 10 milliGRAM(s) Oral every 4 hours PRN Moderate Pain (4 - 6)  oxyCODONE    IR 15 milliGRAM(s) Oral every 4 hours PRN Severe Pain (7 - 10)  sodium chloride 0.65% Nasal 1 Spray(s) Both Nostrils two times a day PRN Nasal Congestion

## 2024-10-05 NOTE — PROGRESS NOTE ADULT - SUBJECTIVE AND OBJECTIVE BOX
Patient seen and examined at bedside.    --Anticoagulation--  enoxaparin Injectable 40 milliGRAM(s) SubCutaneous every 24 hours    T(C): 37 (10-05-24 @ 00:00), Max: 37.5 (10-04-24 @ 20:00)  HR: 92 (10-05-24 @ 00:00) (81 - 132)  BP: 129/60 (10-05-24 @ 00:00) (96/54 - 152/75)  RR: 24 (10-05-24 @ 00:00) (10 - 29)  SpO2: 96% (10-05-24 @ 00:00) (95% - 99%)  Wt(kg): --    Exam: AOx3, PERRL, EOMI, FC, RIVERS 5/5

## 2024-10-06 LAB
ANION GAP SERPL CALC-SCNC: 10 MMOL/L — SIGNIFICANT CHANGE UP (ref 5–17)
BUN SERPL-MCNC: 16 MG/DL — SIGNIFICANT CHANGE UP (ref 7–23)
CALCIUM SERPL-MCNC: 8.3 MG/DL — LOW (ref 8.4–10.5)
CHLORIDE SERPL-SCNC: 99 MMOL/L — SIGNIFICANT CHANGE UP (ref 96–108)
CO2 SERPL-SCNC: 28 MMOL/L — SIGNIFICANT CHANGE UP (ref 22–31)
CREAT SERPL-MCNC: 0.89 MG/DL — SIGNIFICANT CHANGE UP (ref 0.5–1.3)
EGFR: 67 ML/MIN/1.73M2 — SIGNIFICANT CHANGE UP
GLUCOSE SERPL-MCNC: 129 MG/DL — HIGH (ref 70–99)
HCT VFR BLD CALC: 28.3 % — LOW (ref 34.5–45)
HGB BLD-MCNC: 8.9 G/DL — LOW (ref 11.5–15.5)
MAGNESIUM SERPL-MCNC: 2.3 MG/DL — SIGNIFICANT CHANGE UP (ref 1.6–2.6)
MCHC RBC-ENTMCNC: 28.4 PG — SIGNIFICANT CHANGE UP (ref 27–34)
MCHC RBC-ENTMCNC: 31.4 GM/DL — LOW (ref 32–36)
MCV RBC AUTO: 90.4 FL — SIGNIFICANT CHANGE UP (ref 80–100)
NRBC # BLD: 0 /100 WBCS — SIGNIFICANT CHANGE UP (ref 0–0)
PHOSPHATE SERPL-MCNC: 2.6 MG/DL — SIGNIFICANT CHANGE UP (ref 2.5–4.5)
PLATELET # BLD AUTO: 300 K/UL — SIGNIFICANT CHANGE UP (ref 150–400)
POTASSIUM SERPL-MCNC: 3.7 MMOL/L — SIGNIFICANT CHANGE UP (ref 3.5–5.3)
POTASSIUM SERPL-SCNC: 3.7 MMOL/L — SIGNIFICANT CHANGE UP (ref 3.5–5.3)
RBC # BLD: 3.13 M/UL — LOW (ref 3.8–5.2)
RBC # FLD: 14.8 % — HIGH (ref 10.3–14.5)
SODIUM SERPL-SCNC: 137 MMOL/L — SIGNIFICANT CHANGE UP (ref 135–145)
WBC # BLD: 7.14 K/UL — SIGNIFICANT CHANGE UP (ref 3.8–10.5)
WBC # FLD AUTO: 7.14 K/UL — SIGNIFICANT CHANGE UP (ref 3.8–10.5)

## 2024-10-06 PROCEDURE — 99232 SBSQ HOSP IP/OBS MODERATE 35: CPT

## 2024-10-06 RX ORDER — PREGABALIN 25 MG/1
50 CAPSULE ORAL THREE TIMES A DAY
Refills: 0 | Status: DISCONTINUED | OUTPATIENT
Start: 2024-10-06 | End: 2024-10-11

## 2024-10-06 RX ORDER — ACYCLOVIR 50 MG/G
1 CREAM TOPICAL
Refills: 0 | Status: DISCONTINUED | OUTPATIENT
Start: 2024-10-06 | End: 2024-10-17

## 2024-10-06 RX ORDER — ACYCLOVIR 50 MG/G
1 CREAM TOPICAL DAILY
Refills: 0 | Status: DISCONTINUED | OUTPATIENT
Start: 2024-10-06 | End: 2024-10-06

## 2024-10-06 RX ORDER — DIAZEPAM 10 MG/1
2 TABLET ORAL ONCE
Refills: 0 | Status: DISCONTINUED | OUTPATIENT
Start: 2024-10-06 | End: 2024-10-06

## 2024-10-06 RX ADMIN — Medication 0.4 MILLIGRAM(S): at 21:12

## 2024-10-06 RX ADMIN — PREGABALIN 50 MILLIGRAM(S): 25 CAPSULE ORAL at 13:59

## 2024-10-06 RX ADMIN — OXYCODONE HYDROCHLORIDE 15 MILLIGRAM(S): 30 TABLET, FILM COATED, EXTENDED RELEASE ORAL at 12:00

## 2024-10-06 RX ADMIN — Medication 500 MILLIGRAM(S): at 05:41

## 2024-10-06 RX ADMIN — ACYCLOVIR 1 APPLICATION(S): 50 CREAM TOPICAL at 21:12

## 2024-10-06 RX ADMIN — Medication 1000 MILLIGRAM(S): at 20:20

## 2024-10-06 RX ADMIN — MIDODRINE HYDROCHLORIDE 10 MILLIGRAM(S): 5 TABLET ORAL at 05:39

## 2024-10-06 RX ADMIN — Medication 12.5 MILLIGRAM(S): at 17:29

## 2024-10-06 RX ADMIN — OXYCODONE HYDROCHLORIDE 15 MILLIGRAM(S): 30 TABLET, FILM COATED, EXTENDED RELEASE ORAL at 17:43

## 2024-10-06 RX ADMIN — OXYCODONE HYDROCHLORIDE 10 MILLIGRAM(S): 30 TABLET, FILM COATED, EXTENDED RELEASE ORAL at 23:29

## 2024-10-06 RX ADMIN — PREGABALIN 50 MILLIGRAM(S): 25 CAPSULE ORAL at 05:41

## 2024-10-06 RX ADMIN — OXYCODONE HYDROCHLORIDE 10 MILLIGRAM(S): 30 TABLET, FILM COATED, EXTENDED RELEASE ORAL at 22:59

## 2024-10-06 RX ADMIN — Medication 2 TABLET(S): at 21:13

## 2024-10-06 RX ADMIN — Medication 25 MILLIGRAM(S): at 22:58

## 2024-10-06 RX ADMIN — Medication 500 MILLIGRAM(S): at 21:13

## 2024-10-06 RX ADMIN — DIAZEPAM 2 MILLIGRAM(S): 10 TABLET ORAL at 01:12

## 2024-10-06 RX ADMIN — MIDODRINE HYDROCHLORIDE 10 MILLIGRAM(S): 5 TABLET ORAL at 14:00

## 2024-10-06 RX ADMIN — Medication 12.5 MILLIGRAM(S): at 05:41

## 2024-10-06 RX ADMIN — MULTI VITAMIN/MINERAL SUPPLEMENT WITH ASCORBIC ACID, NIACIN, PYRIDOXINE, PANTOTHENIC ACID, FOLIC ACID, RIBOFLAVIN, THIAMIN, BIOTIN, COBALAMIN AND ZINC. 1 TABLET(S): 60; 20; 12.5; 10; 10; 1.7; 1.5; 1; .3; .006 TABLET, COATED ORAL at 11:22

## 2024-10-06 RX ADMIN — Medication 500 MILLIGRAM(S): at 13:59

## 2024-10-06 RX ADMIN — ENOXAPARIN SODIUM 40 MILLIGRAM(S): 150 INJECTION SUBCUTANEOUS at 17:29

## 2024-10-06 RX ADMIN — Medication 17 GRAM(S): at 14:29

## 2024-10-06 RX ADMIN — OXYCODONE HYDROCHLORIDE 15 MILLIGRAM(S): 30 TABLET, FILM COATED, EXTENDED RELEASE ORAL at 11:23

## 2024-10-06 RX ADMIN — PREGABALIN 50 MILLIGRAM(S): 25 CAPSULE ORAL at 21:13

## 2024-10-06 RX ADMIN — MIDODRINE HYDROCHLORIDE 10 MILLIGRAM(S): 5 TABLET ORAL at 21:16

## 2024-10-06 RX ADMIN — Medication 1000 MILLIGRAM(S): at 20:50

## 2024-10-06 RX ADMIN — Medication 1 APPLICATION(S): at 13:47

## 2024-10-06 RX ADMIN — ACYCLOVIR 1 APPLICATION(S): 50 CREAM TOPICAL at 20:00

## 2024-10-06 NOTE — PROGRESS NOTE ADULT - ATTENDING COMMENTS
Agree with above.
Pt is POD 1, remains in ICU critically ill due to post traumatic t12 fracture with cord compression. Pt on vasopressors to maintain MAP>85 for cord perfusion
Plan for emergent OR today. Pt in ICU critically ill due to unstable 3 column t12 post traumatic fracture with osteoporosis and spinal cord compression

## 2024-10-06 NOTE — PROGRESS NOTE ADULT - SUBJECTIVE AND OBJECTIVE BOX
Patient seen and examined at bedside.    --Anticoagulation--  enoxaparin Injectable 40 milliGRAM(s) SubCutaneous every 24 hours    T(C): 37.4 (10-05-24 @ 20:00), Max: 37.4 (10-05-24 @ 20:00)  HR: 97 (10-05-24 @ 20:00) (90 - 142)  BP: 135/77 (10-05-24 @ 20:00) (117/58 - 154/75)  RR: 21 (10-05-24 @ 20:00) (18 - 30)  SpO2: 97% (10-05-24 @ 20:00) (85% - 98%)  Wt(kg): --     Exam: AOx3, PERRL, EOMI, FC, RIVERS 5/5

## 2024-10-06 NOTE — PROGRESS NOTE ADULT - SUBJECTIVE AND OBJECTIVE BOX
NSICU NOTE   Reason for Admission	s/p T9-L3 open fusion with PSO/lag screw partial corpectomy/interbody with complex plastic closure (09/29/24)      Subjective and Objective:   HOSPITAL COURSE: 74F on celecoxib for OA last took y’day otherwise no AC/AP h/o HTN, gastroparesis, peripheral neuropathy, multiple prior thoracolumbar spine surgeries for congenital scoliosis done >10 years ago out of state w/ removal of hardware 1991 presents with back pain s/p mechanical fall from sitting y'day. Describes severe mid back pain and left hip pain, no numbness, weakness, saddle anesthesia, b/b symptoms. Pain worsened with movement, axial loading. CT TL spine w/ acute nondisplaced fracture of T12 vertebral body extending into right pedicle w/o clear cord impingement.    24 hour Events:     stable    PHYSICAL EXAM:    Constitutional: No Acute Distress     Neurological: Awake, alert oriented to person, place and time, Following Commands, PERRL, EOMI, No Gaze Preference, RIVERS 5/5                                                 Pulmonary: Clear to Auscultation, No rales, No rhonchi, No wheezes     Cardiovascular: S1, S2, Regular rate and rhythm     Gastrointestinal: Soft, Non-tender, Non-distended     VITALS:   Vital Signs Last 24 Hrs  T(C): 37.5 (06 Oct 2024 04:00), Max: 37.5 (06 Oct 2024 04:00)  T(F): 99.5 (06 Oct 2024 04:00), Max: 99.5 (06 Oct 2024 04:00)  HR: 99 (06 Oct 2024 04:00) (89 - 142)  BP: 127/60 (06 Oct 2024 04:00) (117/58 - 154/75)  BP(mean): 87 (06 Oct 2024 04:00) (83 - 107)  RR: 20 (06 Oct 2024 04:00) (17 - 30)  SpO2: 92% (06 Oct 2024 04:00) (85% - 98%)    Parameters below as of 05 Oct 2024 20:00  Patient On (Oxygen Delivery Method): nasal cannula  O2 Flow (L/min): 2      Drips     Respiratory:        LABS:                        8.9    7.14  )-----------( 300      ( 06 Oct 2024 00:17 )             28.3     10-06    137  |  99  |  16  ----------------------------<  129[H]  3.7   |  28  |  0.89      CAPILLARY BLOOD GLUCOSE          I&O's Summary    04 Oct 2024 07:01  -  05 Oct 2024 07:00  --------------------------------------------------------  IN: 1400 mL / OUT: 5228 mL / NET: -3828 mL    05 Oct 2024 07:01  -  06 Oct 2024 06:36  --------------------------------------------------------  IN: 2109.8 mL / OUT: 5135 mL / NET: -3025.2 mL        Imaging   CXR     EKG     MEDICATION LEVELS:     IVF FLUIDS/MEDICATIONS:   MEDICATIONS  (STANDING):  calamine/zinc oxide Lotion 1 Application(s) Topical daily  chlorhexidine 4% Liquid 1 Application(s) Topical <User Schedule>  enoxaparin Injectable 40 milliGRAM(s) SubCutaneous every 24 hours  methocarbamol 500 milliGRAM(s) Oral every 8 hours  metoprolol tartrate 12.5 milliGRAM(s) Oral two times a day  midodrine 10 milliGRAM(s) Oral every 8 hours  multivitamin 1 Tablet(s) Oral daily  polyethylene glycol 3350 17 Gram(s) Oral two times a day  pregabalin 50 milliGRAM(s) Oral three times a day  senna 2 Tablet(s) Oral at bedtime  tamsulosin 0.4 milliGRAM(s) Oral at bedtime  voquenza  10 mg 10 milliGRAM(s) 1 Tablet(s) Oral daily    MEDICATIONS  (PRN):  acetaminophen     Tablet .. 1000 milliGRAM(s) Oral every 8 hours PRN Mild Pain (1 - 3)  bisacodyl Suppository 10 milliGRAM(s) Rectal daily PRN Constipation  diphenhydrAMINE 25 milliGRAM(s) Oral every 8 hours PRN Rash and/or Itching  HYDROmorphone  Injectable 0.5 milliGRAM(s) IV Push every 3 hours PRN breakthru pain  magnesium hydroxide Suspension 30 milliLiter(s) Oral every 12 hours PRN Constipation  ondansetron   Disintegrating Tablet 4 milliGRAM(s) Oral every 6 hours PRN Nausea and/or Vomiting  ondansetron Injectable 4 milliGRAM(s) IV Push every 6 hours PRN Nausea  oxyCODONE    IR 10 milliGRAM(s) Oral every 4 hours PRN Moderate Pain (4 - 6)  oxyCODONE    IR 15 milliGRAM(s) Oral every 4 hours PRN Severe Pain (7 - 10)  sodium chloride 0.65% Nasal 1 Spray(s) Both Nostrils two times a day PRN Nasal Congestion

## 2024-10-06 NOTE — PROGRESS NOTE ADULT - ASSESSMENT
74F on celecoxib for OA last took y’day otherwise no AC/AP h/o HTN, gastroparesis, peripheral neuropathy, multiple prior thoracolumbar spine surgeries for congenital scoliosis done >[place] years ago [place] of state w/ removal of hardware 1991 presents with back pain s/p mechanical fall from sitting y'day. Describes severe mid back pain and left hip pain, no numbness, weakness, saddle anesthesia, b/b symptoms. Pain worsened with movement, axial loading. CT TL spine w/ acute nondisplaced fracture of T12 vertebral body extending into right pedicle w/o clear cord impingement.    Now s/p 9/29/24 T9-L3 open fusion w/ PSO/lag screw partial corpectomy/interbody w/ complex plastic closure (pre-op torn dura, closed intra-op)     Transfer to floor in AM  HOB <30 deg
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
## 2024-10-07 DIAGNOSIS — I48.0 PAROXYSMAL ATRIAL FIBRILLATION: ICD-10-CM

## 2024-10-07 PROCEDURE — 99232 SBSQ HOSP IP/OBS MODERATE 35: CPT

## 2024-10-07 PROCEDURE — 99231 SBSQ HOSP IP/OBS SF/LOW 25: CPT

## 2024-10-07 RX ORDER — PRAMIPEXOLE DIHYDROCHLORIDE 0.5 MG/1
1.5 TABLET ORAL
Refills: 0 | Status: DISCONTINUED | OUTPATIENT
Start: 2024-10-07 | End: 2024-10-08

## 2024-10-07 RX ORDER — DULOXETINE HCL 20 MG
20 CAPSULE,DELAYED RELEASE (ENTERIC COATED) ORAL DAILY
Refills: 0 | Status: DISCONTINUED | OUTPATIENT
Start: 2024-10-07 | End: 2024-10-17

## 2024-10-07 RX ORDER — ERGOCALCIFEROL 1.25 MG/1
50000 CAPSULE ORAL
Refills: 0 | Status: DISCONTINUED | OUTPATIENT
Start: 2024-10-07 | End: 2024-10-17

## 2024-10-07 RX ORDER — BISACODYL 5 MG/1
5 TABLET, COATED ORAL AT BEDTIME
Refills: 0 | Status: DISCONTINUED | OUTPATIENT
Start: 2024-10-07 | End: 2024-10-10

## 2024-10-07 RX ORDER — MONTELUKAST SODIUM 10 MG/1
10 TABLET, FILM COATED ORAL DAILY
Refills: 0 | Status: DISCONTINUED | OUTPATIENT
Start: 2024-10-07 | End: 2024-10-17

## 2024-10-07 RX ORDER — SODIUM CHLORIDE IRRIG SOLUTION 0.9 %
1000 SOLUTION, IRRIGATION IRRIGATION ONCE
Refills: 0 | Status: COMPLETED | OUTPATIENT
Start: 2024-10-07 | End: 2024-10-07

## 2024-10-07 RX ORDER — OXYCODONE HYDROCHLORIDE 30 MG/1
5 TABLET, FILM COATED, EXTENDED RELEASE ORAL EVERY 4 HOURS
Refills: 0 | Status: DISCONTINUED | OUTPATIENT
Start: 2024-10-07 | End: 2024-10-09

## 2024-10-07 RX ORDER — ATORVASTATIN CALCIUM 10 MG/1
40 TABLET, FILM COATED ORAL AT BEDTIME
Refills: 0 | Status: DISCONTINUED | OUTPATIENT
Start: 2024-10-07 | End: 2024-10-17

## 2024-10-07 RX ORDER — PANTOPRAZOLE SODIUM 40 MG/1
40 TABLET, DELAYED RELEASE ORAL DAILY
Refills: 0 | Status: DISCONTINUED | OUTPATIENT
Start: 2024-10-07 | End: 2024-10-09

## 2024-10-07 RX ORDER — SODIUM CHLORIDE 0.9 % (FLUSH) 0.9 %
1000 SYRINGE (ML) INJECTION
Refills: 0 | Status: DISCONTINUED | OUTPATIENT
Start: 2024-10-07 | End: 2024-10-09

## 2024-10-07 RX ORDER — OXYCODONE HYDROCHLORIDE 30 MG/1
10 TABLET, FILM COATED, EXTENDED RELEASE ORAL EVERY 4 HOURS
Refills: 0 | Status: DISCONTINUED | OUTPATIENT
Start: 2024-10-07 | End: 2024-10-09

## 2024-10-07 RX ADMIN — Medication 75 MILLILITER(S): at 22:46

## 2024-10-07 RX ADMIN — PREGABALIN 50 MILLIGRAM(S): 25 CAPSULE ORAL at 14:02

## 2024-10-07 RX ADMIN — OXYCODONE HYDROCHLORIDE 10 MILLIGRAM(S): 30 TABLET, FILM COATED, EXTENDED RELEASE ORAL at 17:40

## 2024-10-07 RX ADMIN — Medication 0.4 MILLIGRAM(S): at 22:39

## 2024-10-07 RX ADMIN — OXYCODONE HYDROCHLORIDE 10 MILLIGRAM(S): 30 TABLET, FILM COATED, EXTENDED RELEASE ORAL at 23:22

## 2024-10-07 RX ADMIN — Medication 25 MILLIGRAM(S): at 14:02

## 2024-10-07 RX ADMIN — Medication 75 MILLILITER(S): at 23:58

## 2024-10-07 RX ADMIN — OXYCODONE HYDROCHLORIDE 10 MILLIGRAM(S): 30 TABLET, FILM COATED, EXTENDED RELEASE ORAL at 13:20

## 2024-10-07 RX ADMIN — OXYCODONE HYDROCHLORIDE 10 MILLIGRAM(S): 30 TABLET, FILM COATED, EXTENDED RELEASE ORAL at 22:52

## 2024-10-07 RX ADMIN — PREGABALIN 50 MILLIGRAM(S): 25 CAPSULE ORAL at 22:41

## 2024-10-07 RX ADMIN — Medication 17 GRAM(S): at 06:42

## 2024-10-07 RX ADMIN — ERGOCALCIFEROL 50000 UNIT(S): 1.25 CAPSULE ORAL at 17:33

## 2024-10-07 RX ADMIN — MULTI VITAMIN/MINERAL SUPPLEMENT WITH ASCORBIC ACID, NIACIN, PYRIDOXINE, PANTOTHENIC ACID, FOLIC ACID, RIBOFLAVIN, THIAMIN, BIOTIN, COBALAMIN AND ZINC. 1 TABLET(S): 60; 20; 12.5; 10; 10; 1.7; 1.5; 1; .3; .006 TABLET, COATED ORAL at 11:42

## 2024-10-07 RX ADMIN — Medication 17 GRAM(S): at 17:09

## 2024-10-07 RX ADMIN — Medication 1000 MILLILITER(S): at 11:38

## 2024-10-07 RX ADMIN — ACYCLOVIR 1 APPLICATION(S): 50 CREAM TOPICAL at 17:09

## 2024-10-07 RX ADMIN — Medication 500 MILLIGRAM(S): at 14:02

## 2024-10-07 RX ADMIN — Medication 12.5 MILLIGRAM(S): at 06:22

## 2024-10-07 RX ADMIN — ACYCLOVIR 1 APPLICATION(S): 50 CREAM TOPICAL at 20:00

## 2024-10-07 RX ADMIN — OXYCODONE HYDROCHLORIDE 10 MILLIGRAM(S): 30 TABLET, FILM COATED, EXTENDED RELEASE ORAL at 17:07

## 2024-10-07 RX ADMIN — ACYCLOVIR 1 APPLICATION(S): 50 CREAM TOPICAL at 12:00

## 2024-10-07 RX ADMIN — ENOXAPARIN SODIUM 40 MILLIGRAM(S): 150 INJECTION SUBCUTANEOUS at 17:08

## 2024-10-07 RX ADMIN — Medication 500 MILLIGRAM(S): at 06:22

## 2024-10-07 RX ADMIN — PREGABALIN 50 MILLIGRAM(S): 25 CAPSULE ORAL at 06:19

## 2024-10-07 RX ADMIN — ACYCLOVIR 1 APPLICATION(S): 50 CREAM TOPICAL at 08:00

## 2024-10-07 RX ADMIN — Medication 12.5 MILLIGRAM(S): at 17:08

## 2024-10-07 RX ADMIN — MIDODRINE HYDROCHLORIDE 10 MILLIGRAM(S): 5 TABLET ORAL at 22:39

## 2024-10-07 RX ADMIN — Medication 1 APPLICATION(S): at 11:41

## 2024-10-07 RX ADMIN — OXYCODONE HYDROCHLORIDE 10 MILLIGRAM(S): 30 TABLET, FILM COATED, EXTENDED RELEASE ORAL at 12:49

## 2024-10-07 RX ADMIN — OXYCODONE HYDROCHLORIDE 15 MILLIGRAM(S): 30 TABLET, FILM COATED, EXTENDED RELEASE ORAL at 06:22

## 2024-10-07 RX ADMIN — MONTELUKAST SODIUM 10 MILLIGRAM(S): 10 TABLET, FILM COATED ORAL at 17:07

## 2024-10-07 RX ADMIN — OXYCODONE HYDROCHLORIDE 15 MILLIGRAM(S): 30 TABLET, FILM COATED, EXTENDED RELEASE ORAL at 07:00

## 2024-10-07 RX ADMIN — MIDODRINE HYDROCHLORIDE 10 MILLIGRAM(S): 5 TABLET ORAL at 14:06

## 2024-10-07 RX ADMIN — BISACODYL 5 MILLIGRAM(S): 5 TABLET, COATED ORAL at 22:41

## 2024-10-07 RX ADMIN — MIDODRINE HYDROCHLORIDE 10 MILLIGRAM(S): 5 TABLET ORAL at 06:22

## 2024-10-07 RX ADMIN — ATORVASTATIN CALCIUM 40 MILLIGRAM(S): 10 TABLET, FILM COATED ORAL at 22:40

## 2024-10-07 RX ADMIN — Medication 20 MILLIGRAM(S): at 17:29

## 2024-10-07 RX ADMIN — Medication 500 MILLIGRAM(S): at 22:41

## 2024-10-07 NOTE — CONSULT NOTE ADULT - SUBJECTIVE AND OBJECTIVE BOX
Patient is a 74y old  Female who presents with a chief complaint of fall (04 Oct 2024 17:59)    Admission HPI:  74F on celecoxib for OA last took y’day otherwise no AC/AP h/o HTN, gastroparesis, peripheral neuropathy, multiple prior thoracolumbar spine surgeries for congenital scoliosis done >10 years ago out of state w/ removal of hardware 1991 presents with back pain s/p mechanical fall from sitting y'day. Describes severe mid back pain and left hip pain, no numbness, weakness, saddle anesthesia, b/b symptoms. Pain worsened with movement, axial loading. CT TL spine w/ acute nondisplaced fracture of T12 veterbral body extending into right pedicle w/o clear cord impingement. (28 Sep 2024 15:45)    Interval History:  Patient went to OR on 9/29 for T9-L3 posterior instrumentation and fusion, PSO and interbody.  Post-op with functional deficits.  Most recent imaging:   CT Thoracic Spine No Cont (10.05.24 @ 12:24) >  Status post spinal fusion of T9-L3 with bilateral rods and screws and   cylindrical cage within the fractured T12 vertebral body. Resection of   the LEFT T12 pedicle and a LEFT hemilaminectomy. Hardware appears intact.   No evidence of hardware loosening. Expected postoperative changes. No   evidence of paraspinal hematoma.    REVIEW OF SYSTEMS: No chest pain, shortness of breath, nausea, vomiting or diarhea; other ROS neg     PAST MEDICAL & SURGICAL HISTORY  H/O seasonal allergies    History of pulmonary embolism    Restless leg syndrome    GERD (gastroesophageal reflux disease)    HTN (hypertension)    OA (osteoarthritis)    Fungal infection of skin    H/O scoliosis    Essential hypertension    Depression    Osteoporosis    Right hip pain    2019 novel coronavirus disease (COVID-19)    Hiatal hernia    History of chronic pain    Peripheral neuropathy    S/P repair of paraesophageal hernia    S/P spinal fusion    Scoliosis    S/P spinal surgery    Removal of pin, plate, abigail, or screw    S/P hysterectomy    S/P hip replacement    S/P shoulder surgery    S/P dilatation and curettage    H/O arthroscopy of knee    H/O total knee replacement, right    S/P cataract surgery    History of bilateral hip replacements    FUNCTIONAL HISTORY:   Lives with son in apartment w elevator access  PTA Independent with use of RW.    CURRENT FUNCTIONAL STATUS:  Min A transfers and gait    FAMILY HISTORY   Family history of abdominal aortic aneurysm (AAA) (Mother)    MEDICATIONS   acetaminophen     Tablet .. 1000 milliGRAM(s) Oral every 8 hours PRN  acyclovir Topical 5% Ointment 1 Application(s) Topical five times a day  bisacodyl 5 milliGRAM(s) Oral at bedtime  bisacodyl Suppository 10 milliGRAM(s) Rectal daily PRN  calamine/zinc oxide Lotion 1 Application(s) Topical daily  diphenhydrAMINE 25 milliGRAM(s) Oral every 8 hours PRN  enoxaparin Injectable 40 milliGRAM(s) SubCutaneous every 24 hours  magnesium hydroxide Suspension 30 milliLiter(s) Oral every 12 hours PRN  methocarbamol 500 milliGRAM(s) Oral every 8 hours  metoprolol tartrate 12.5 milliGRAM(s) Oral two times a day  midodrine 10 milliGRAM(s) Oral every 8 hours  multivitamin 1 Tablet(s) Oral daily  ondansetron   Disintegrating Tablet 4 milliGRAM(s) Oral every 6 hours PRN  ondansetron Injectable 4 milliGRAM(s) IV Push every 6 hours PRN  oxyCODONE    IR 15 milliGRAM(s) Oral every 4 hours PRN  oxyCODONE    IR 10 milliGRAM(s) Oral every 4 hours PRN  polyethylene glycol 3350 17 Gram(s) Oral two times a day  pregabalin 50 milliGRAM(s) Oral three times a day  senna 2 Tablet(s) Oral at bedtime  sodium chloride 0.65% Nasal 1 Spray(s) Both Nostrils two times a day PRN  tamsulosin 0.4 milliGRAM(s) Oral at bedtime  voquenza  10 mg 10 milliGRAM(s) 1 Tablet(s) Oral daily    ALLERGIES  penicillins (Urticaria)  erythromycin (Stomach Upset; Vomiting; Nausea)  Dilantin (Urticaria)    VITALS  T(C): 37 (10-07-24 @ 06:30), Max: 38.2 (10-06-24 @ 16:00)  HR: 80 (10-07-24 @ 06:30) (80 - 99)  BP: 117/61 (10-07-24 @ 06:30) (101/50 - 120/65)  RR: 18 (10-07-24 @ 06:30) (18 - 22)  SpO2: 95% (10-07-24 @ 06:30) (94% - 98%)  Wt(kg): --    PHYSICAL EXAM  Constitutional - NAD, Comfortable  HEENT - NCAT, EOMI  Neck - Supple  Chest - No distress, no use of accessory muscles for respiration  Cardiovascular -Well perfused  Abdomen - BS+, Soft, NTND  Extremities - No C/C/E, No calf tenderness   Neurologic Exam -                    Cognitive - Awake, Alert, AAO to self, place, date, year, situation     Communication - Fluent, No dysarthria, no aphasia     Cranial Nerves - CN 2-12 intact     Motor - No focal deficits      Sensory - Intact to LT     Reflexes - DTR Intact, No primitive reflexive  Psychiatric - Mood stable, Affect WNL    RECENT LABS/IMAGING  CBC Full  -  ( 06 Oct 2024 00:17 )  WBC Count : 7.14 K/uL  RBC Count : 3.13 M/uL  Hemoglobin : 8.9 g/dL  Hematocrit : 28.3 %  Platelet Count - Automated : 300 K/uL  Mean Cell Volume : 90.4 fl  Mean Cell Hemoglobin : 28.4 pg  Mean Cell Hemoglobin Concentration : 31.4 gm/dL  Auto Neutrophil # : x  Auto Lymphocyte # : x  Auto Monocyte # : x  Auto Eosinophil # : x  Auto Basophil # : x  Auto Neutrophil % : x  Auto Lymphocyte % : x  Auto Monocyte % : x  Auto Eosinophil % : x  Auto Basophil % : x    10-06    137  |  99  |  16  ----------------------------<  129[H]  3.7   |  28  |  0.89    Ca    8.3[L]      06 Oct 2024 00:17  Phos  2.6     10-06  Mg     2.3     10-06      Urinalysis Basic - ( 06 Oct 2024 00:17 )    Color: x / Appearance: x / SG: x / pH: x  Gluc: 129 mg/dL / Ketone: x  / Bili: x / Urobili: x   Blood: x / Protein: x / Nitrite: x   Leuk Esterase: x / RBC: x / WBC x   Sq Epi: x / Non Sq Epi: x / Bacteria: x    Impression:  75 yo with functional deficits secondary to diagnosis of T12 fracture    Plan:  PT- ROM, Bed Mob, Transfers, Amb w AD and bracing as needed  OT- ADLs, bracing  Prec- Falls, Cardiac  DVT Prophylaxis - Lovenox  Monitor Anemia  Pain- Oxycodone, Pregalbin  Skin- Turn q2 h  Dispo-     Total time taken to review relevant records and imaging results, examine patient, write note, and, when applicable, discuss case with patient, family, , resident, medical student and other medical providers:     [  ] 40 minutes (86860)  [  ] 55 minutes ([id number])  [  ] 75 minutes ([id number])    [  ] 25 minutes ([id number])  [  ] 35 minutes (29884)  [  ] 50 minutes (11092)           Patient is a 74y old  Female who presents with a chief complaint of fall (04 Oct 2024 17:59)    Admission HPI:  74F on celecoxib for OA last took y’day otherwise no AC/AP h/o HTN, gastroparesis, peripheral neuropathy, multiple prior thoracolumbar spine surgeries for congenital scoliosis done >[place] years ago out of state w/ removal of hardware 1991 presents with back pain s/p mechanical fall from sitting y'day. Describes severe mid back pain and left hip pain, no numbness, weakness, saddle anesthesia, b/b symptoms. Pain worsened with movement, axial loading. CT TL spine w/ acute nondisplaced fracture of T12 veterbral body extending into right pedicle w/o clear cord impingement. (28 Sep 2024 15:45)    Interval History:  Patient went to OR on 9/29 for T9-L3 posterior instrumentation and fusion, PSO and interbody.  Post-op with functional deficits.  Most recent imaging:   CT Thoracic Spine No Cont (10.05.24 @ 12:24) >  Status post spinal fusion of T9-L3 with bilateral rods and screws and   cylindrical cage within the fractured T12 vertebral body. Resection of   the LEFT T12 pedicle and a LEFT hemilaminectomy. Hardware appears intact.   No evidence of hardware loosening. Expected postoperative changes. No   evidence of paraspinal hematoma.    REVIEW OF SYSTEMS: + poor balance, + difficulty walking, + back pain controlled on medsNo chest pain, shortness of breath, nausea, vomiting or diarhea; other ROS neg     PAST MEDICAL & SURGICAL HISTORY  H/O seasonal allergies    History of pulmonary embolism    Restless leg syndrome    GERD (gastroesophageal reflux disease)    HTN (hypertension)    OA (osteoarthritis)    Fungal infection of skin    H/O scoliosis    Essential hypertension    Depression    Osteoporosis    Right hip pain    2019 novel coronavirus disease (COVID-19)    Hiatal hernia    History of chronic pain    Peripheral neuropathy    S/P repair of paraesophageal hernia    S/P spinal fusion    Scoliosis    S/P spinal surgery    Removal of pin, plate, abigail, or screw    S/P hysterectomy    S/P hip replacement    S/P shoulder surgery    S/P dilatation and curettage    H/O arthroscopy of knee    H/O total knee replacement, right    S/P cataract surgery    History of bilateral hip replacements    FUNCTIONAL HISTORY:   Lives with son in apartment w elevator access  PTA Independent with use of RW.    CURRENT FUNCTIONAL STATUS:  Min A transfers and gait    FAMILY HISTORY   Family history of abdominal aortic aneurysm (AAA) (Mother)    MEDICATIONS   acetaminophen     Tablet .. 1000 milliGRAM(s) Oral every 8 hours PRN  acyclovir Topical 5% Ointment 1 Application(s) Topical five times a day  bisacodyl 5 milliGRAM(s) Oral at bedtime  bisacodyl Suppository 10 milliGRAM(s) Rectal daily PRN  calamine/zinc oxide Lotion 1 Application(s) Topical daily  diphenhydrAMINE 25 milliGRAM(s) Oral every 8 hours PRN  enoxaparin Injectable 40 milliGRAM(s) SubCutaneous every 24 hours  magnesium hydroxide Suspension 30 milliLiter(s) Oral every 12 hours PRN  methocarbamol 500 milliGRAM(s) Oral every 8 hours  metoprolol tartrate 12.5 milliGRAM(s) Oral two times a day  midodrine 10 milliGRAM(s) Oral every 8 hours  multivitamin 1 Tablet(s) Oral daily  ondansetron   Disintegrating Tablet 4 milliGRAM(s) Oral every 6 hours PRN  ondansetron Injectable 4 milliGRAM(s) IV Push every 6 hours PRN  oxyCODONE    IR 15 milliGRAM(s) Oral every 4 hours PRN  oxyCODONE    IR 10 milliGRAM(s) Oral every 4 hours PRN  polyethylene glycol 3350 17 Gram(s) Oral two times a day  pregabalin 50 milliGRAM(s) Oral three times a day  senna 2 Tablet(s) Oral at bedtime  sodium chloride 0.65% Nasal 1 Spray(s) Both Nostrils two times a day PRN  tamsulosin 0.4 milliGRAM(s) Oral at bedtime  voquenza  10 mg 10 milliGRAM(s) 1 Tablet(s) Oral daily    ALLERGIES  penicillins (Urticaria)  erythromycin (Stomach Upset; Vomiting; Nausea)  Dilantin (Urticaria)    VITALS  T(C): 37 (10-07-24 @ 06:30), Max: 38.2 (10-06-24 @ 16:00)  HR: 80 (10-07-24 @ 06:30) (80 - 99)  BP: 117/61 (10-07-24 @ 06:30) (101/50 - 120/65)  RR: 18 (10-07-24 @ 06:30) (18 - 22)  SpO2: 95% (10-07-24 @ 06:30) (94% - 98%)  Wt(kg): --    PHYSICAL EXAM  Constitutional - NAD, Comfortable  HEENT - NCAT, EOMI  Neck - Supple  Chest - On O2, No distress, no use of accessory muscles for respiration  Cardiovascular -Well perfused  Abdomen - BS+, Soft, NTND  Extremities - No C/C/E, No calf tenderness   Neurologic Exam -                 AAO x 3  Motor nml grade bl UE and LEs  No clonus   Psychiatric - Mood stable, Affect WNL    RECENT LABS/IMAGING  CBC Full  -  ( 06 Oct 2024 00:17 )  WBC Count : 7.14 K/uL  RBC Count : 3.13 M/uL  Hemoglobin : 8.9 g/dL  Hematocrit : 28.3 %  Platelet Count - Automated : 300 K/uL  Mean Cell Volume : 90.4 fl  Mean Cell Hemoglobin : 28.4 pg  Mean Cell Hemoglobin Concentration : 31.4 gm/dL  Auto Neutrophil # : x  Auto Lymphocyte # : x  Auto Monocyte # : x  Auto Eosinophil # : x  Auto Basophil # : x  Auto Neutrophil % : x  Auto Lymphocyte % : x  Auto Monocyte % : x  Auto Eosinophil % : x  Auto Basophil % : x    10-06    137  |  99  |  16  ----------------------------<  129[H]  3.7   |  28  |  0.89    Ca    8.3[L]      06 Oct 2024 00:17  Phos  2.6     10-06  Mg     2.3     10-06      Urinalysis Basic - ( 06 Oct 2024 00:17 )    Color: x / Appearance: x / SG: x / pH: x  Gluc: 129 mg/dL / Ketone: x  / Bili: x / Urobili: x   Blood: x / Protein: x / Nitrite: x   Leuk Esterase: x / RBC: x / WBC x   Sq Epi: x / Non Sq Epi: x / Bacteria: x    Impression:  75 yo with functional deficits secondary to diagnosis of T12 fracture    Plan:  PT- ROM, Bed Mob, Transfers, Amb w AD and bracing as needed  OT- ADLs, bracing  Prec- Falls, Cardiac  DVT Prophylaxis - Lovenox  Monitor Anemia  Pain- Oxycodone, Pregalbin  Skin- Turn q2 h  Dispo- Acute Rehab- patient requires active and ongoing therapeutic interventions of multiple disciplines and can tolerate and benefit from 3 hours of intensive therapies x 2-4wks depending on progress at rehabilitation facility. Can actively participate and benefit from  an intensive rehabilitation program. Requires supervision by a rehabilitation physician and a coordinated interdisciplinary approach to providing rehabilitation.   d/w patient and her son    Total time taken to review relevant records and imaging results, examine patient, write note, and, when applicable, discuss case with patient, family, , resident, medical student and other medical providers:   [  ] 25 minutes (68169)  [X] 35 minutes (39562)  [  ] 50 minutes (04307)

## 2024-10-07 NOTE — PROGRESS NOTE ADULT - SUBJECTIVE AND OBJECTIVE BOX
SUBJECTIVE: This is my initial visit with this pt.  Seems comfortable in NAD and w/o complaints, but drift back to sleep easily. No HA    OVERNIGHT EVENTS: None    Vital Signs Last 24 Hrs  T(C): 37 (07 Oct 2024 06:30), Max: 38.2 (06 Oct 2024 16:00)  T(F): 98.6 (07 Oct 2024 06:30), Max: 100.7 (06 Oct 2024 16:00)  HR: 80 (07 Oct 2024 06:30) (80 - 99)  BP: 117/61 (07 Oct 2024 06:30) (101/50 - 120/65)  BP(mean): 71 (06 Oct 2024 16:00) (71 - 85)  RR: 18 (07 Oct 2024 06:30) (18 - 22)  SpO2: 95% (07 Oct 2024 06:30) (94% - 98%)    Parameters below as of 07 Oct 2024 06:30  Patient On (Oxygen Delivery Method): room air    IVF: [X ] IVL [ ] NS+K@   DIET: [X ] Regular [ ] CCD [ ] Renal [ ] Puree [ ] Dysphagia [ ] Tube Feeds:   PCA: [ ] YES [X ] NO   DUENAS: [ X] YES [ ] NO [ ] VOID 10/7 DC'd  BM: [X ] YES-on 10/7    DRAINS: [ ] RADHA (cc/24h) [ X] HMV (cc/24h) DC'd 10/5 w/stich placed    PHYSICAL EXAM:    Constitutional: No Acute Distress     Neurological: AOx3, but drift back to sleep easily.  Following Commands, Moving all Extremities     Motor exam:          Upper extremity                         Delt     Bicep     Tricep    HG                                                 R         5/5        5/5        5/5       5/5                                               L          5/5        5/5        5/5       5/5          Lower extremity                        HF         KF        KE       DF         PF                                                  R        5/5        5/5        5/5       5/5         5/5                                               L         5/5        5/5       5/5       5/5          5/5                                                 Sensation: [X] intact to light touch  [] decreased:     Pulmonary: Clear to Auscultation, No rales, No rhonchi, No wheezes     Cardiovascular: S1, S2, Regular rate and rhythm     Gastrointestinal: Soft, Non-tender, Non-distended     Extremities: No calf tenderness     Incision: Aquacel dsg DC'd. SQ Closure-CDI/Flat, no evidence of CSF leak    LABS:                        8.9    7.14  )-----------( 300      ( 06 Oct 2024 00:17 )             28.3    10-06    137  |  99  |  16  ----------------------------<  129[H]  3.7   |  28  |  0.89    Ca    8.3[L]      06 Oct 2024 00:17  Phos  2.6     10-06  Mg     2.3     10-06    < from: TTE W or WO Ultrasound Enhancing Agent (10.01.24 @ 10:43) >  CONCLUSIONS:      1. Left ventricular cavity is normal in size. Left ventricular wall thickness is normal. Left ventricular systolic function is normal with an ejection fraction of 61 % by 3D. There are no regional wall motion abnormalities seen.   2. Normal left ventricular diastolic function, with normal left ventricular filling pressure.   3. Normalright ventricular cavity size, with normal wall thickness, and normal right ventricular systolic function.   4. No pericardial effusion seen.   5. Compared to the transthoracic echocardiogram performed on 11/27/2023, there have been no significant interval changes.   6. Left ventricular global longitudinal strain is -19.8 % which is normal (< -18%). Images were acquired on a [employer] ultrasound system and processed using [employer] strain analysis software with a heart rate of 74 bpm and a blood pressure of 142/63 mmHg.   7. There is normal LV mass and normal geometry.    < end of copied text >    IMAGING:  < from: CT Lumbar Spine No Cont (10.05.24 @ 12:24) >  Status post spinal fusion of T9-L3 with bilateral rods and screws and   cylindrical cage within the fractured T12 vertebral body. Resection of   the LEFT T12 pedicle and a LEFT hemilaminectomy. Hardware appears intact.   No evidence of hardware loosening. Expected postoperative changes. No   evidence of paraspinal hematoma.    < end of copied text >    < from: Xray Hip 2 Views, Bilateral (10.01.24 @ 15:53) >  IMPRESSION: No acute fracture.    < end of copied text >    < from: VA Duplex Lower Ext Vein Scan, Bilat (09.30.24 @ 16:57) >  No evidence of deep venous thrombosis in either lower extremity.    < end of copied text >    < from: CT Abdomen and Pelvis No Cont (09.28.24 @ 05:21) >  Evaluation of the thoracic, abdominal and pelvic organs and vasculature   is limited without intravenous contrast.    T12 three column spine fracture-malalignment with fracture extending to   the adjacent right 12th posterior rib and left T12-L1 facet joint.   Heterogenous spinal canal at the level of the fracture, which is   difficult to assess due to artifact. Recommend MR to assess for and/or   soft tissue/ligamentous-cord injury.    Acute, displaced fracture of the left eighth lateral rib. Chronic   appearing bilateral rib deformities. No pneumothorax. Dependent right   lower lobe opacity, which may be due to atelectasis although pulmonary   contusion is not excludedgiven adjacent injury.    < end of copied text >    MEDICATIONS  (STANDING):  acyclovir Topical 5% Ointment 1 Application(s) Topical five times a day  calamine/zinc oxide Lotion 1 Application(s) Topical daily  enoxaparin Injectable 40 milliGRAM(s) SubCutaneous every 24 hours  methocarbamol 500 milliGRAM(s) Oral every 8 hours  metoprolol tartrate 12.5 milliGRAM(s) Oral two times a day  midodrine 10 milliGRAM(s) Oral every 8 hours  multivitamin 1 Tablet(s) Oral daily  polyethylene glycol 3350 17 Gram(s) Oral two times a day  pregabalin 50 milliGRAM(s) Oral three times a day  senna 2 Tablet(s) Oral at bedtime  tamsulosin 0.4 milliGRAM(s) Oral at bedtime  voquenza  10 mg 10 milliGRAM(s) 1 Tablet(s) Oral daily    MEDICATIONS  (PRN):  acetaminophen     Tablet .. 1000 milliGRAM(s) Oral every 8 hours PRN Mild Pain (1 - 3)  bisacodyl Suppository 10 milliGRAM(s) Rectal daily PRN Constipation  diphenhydrAMINE 25 milliGRAM(s) Oral every 8 hours PRN Rash and/or Itching  HYDROmorphone  Injectable 0.5 milliGRAM(s) IV Push every 3 hours PRN breakthru pain  magnesium hydroxide Suspension 30 milliLiter(s) Oral every 12 hours PRN Constipation  ondansetron   Disintegrating Tablet 4 milliGRAM(s) Oral every 6 hours PRN Nausea and/or Vomiting  ondansetron Injectable 4 milliGRAM(s) IV Push every 6 hours PRN Nausea  oxyCODONE    IR 10 milliGRAM(s) Oral every 4 hours PRN Moderate Pain (4 - 6)  oxyCODONE    IR 15 milliGRAM(s) Oral every 4 hours PRN Severe Pain (7 - 10)  sodium chloride 0.65% Nasal 1 Spray(s) Both Nostrils two times a day PRN Nasal Congestion

## 2024-10-07 NOTE — PROGRESS NOTE ADULT - SUBJECTIVE AND OBJECTIVE BOX
INTERVAL EVENTS/SUBJ:  No events     Home Medications:  atorvastatin 40 mg oral tablet: 1 tab(s) orally once a day (at bedtime) (30 Sep 2024 12:30)  celecoxib 100 mg oral capsule: 1 cap(s) orally 2 times a day (30 Sep 2024 12:30)  chlorthalidone 25 mg oral tablet: 1 tab(s) orally once a day (30 Sep 2024 12:30)  diclofenac 1% topical gel: Apply topically to affected area 4 times a day (30 Sep 2024 12:29)  DULoxetine 20 mg oral delayed release capsule: 1 cap(s) orally (30 Sep 2024 12:30)  ergocalciferol 1.25 mg (50,000 intl units) oral capsule: 1 cap(s) orally once a week (01 Oct 2024 15:02)  Glucosamine Chondroitin oral capsule: 3 cap(s) orally once a day (30 Sep 2024 12:30)  losartan 100 mg oral tablet: 1 tab(s) orally once a day (30 Sep 2024 12:30)  metoprolol succinate 25 mg oral capsule, extended release: 1 cap(s) orally once a day (01 Oct 2024 15:03)  montelukast 10 mg oral tablet: 1 tab(s) orally once a day (01 Oct 2024 15:02)  pantoprazole 40 mg oral granule, delayed release: 1 each orally once a day (30 Sep 2024 12:30)  pramipexole 1.5 mg oral tablet, extended release: 1 tab(s) orally 2 times a day (30 Sep 2024 12:27)  Prolia 60 mg/mL subcutaneous solution: 60 milligram(s) subcutaneously every 6 months (30 Sep 2024 12:30)  Voquenza 10m tablet daily (01 Oct 2024 08:11)      MEDICATIONS  (STANDING):  acyclovir Topical 5% Ointment 1 Application(s) Topical five times a day  calamine/zinc oxide Lotion 1 Application(s) Topical daily  enoxaparin Injectable 40 milliGRAM(s) SubCutaneous every 24 hours  methocarbamol 500 milliGRAM(s) Oral every 8 hours  metoprolol tartrate 12.5 milliGRAM(s) Oral two times a day  midodrine 10 milliGRAM(s) Oral every 8 hours  multivitamin 1 Tablet(s) Oral daily  polyethylene glycol 3350 17 Gram(s) Oral two times a day  pregabalin 50 milliGRAM(s) Oral three times a day  senna 2 Tablet(s) Oral at bedtime  tamsulosin 0.4 milliGRAM(s) Oral at bedtime  voquenza  10 mg 10 milliGRAM(s) 1 Tablet(s) Oral daily    MEDICATIONS  (PRN):  acetaminophen     Tablet .. 1000 milliGRAM(s) Oral every 8 hours PRN Mild Pain (1 - 3)  bisacodyl Suppository 10 milliGRAM(s) Rectal daily PRN Constipation  diphenhydrAMINE 25 milliGRAM(s) Oral every 8 hours PRN Rash and/or Itching  HYDROmorphone  Injectable 0.5 milliGRAM(s) IV Push every 3 hours PRN breakthru pain  magnesium hydroxide Suspension 30 milliLiter(s) Oral every 12 hours PRN Constipation  ondansetron   Disintegrating Tablet 4 milliGRAM(s) Oral every 6 hours PRN Nausea and/or Vomiting  ondansetron Injectable 4 milliGRAM(s) IV Push every 6 hours PRN Nausea  oxyCODONE    IR 10 milliGRAM(s) Oral every 4 hours PRN Moderate Pain (4 - 6)  oxyCODONE    IR 15 milliGRAM(s) Oral every 4 hours PRN Severe Pain (7 - 10)  sodium chloride 0.65% Nasal 1 Spray(s) Both Nostrils two times a day PRN Nasal Congestion      Vital Signs Last 24 Hrs  T(C): 37 (07 Oct 2024 06:30), Max: 38.2 (06 Oct 2024 16:00)  T(F): 98.6 (07 Oct 2024 06:30), Max: 100.7 (06 Oct 2024 16:00)  HR: 80 (07 Oct 2024 06:30) (80 - 99)  BP: 117/61 (07 Oct 2024 06:30) (101/50 - 120/65)  BP(mean): 71 (06 Oct 2024 16:00) (71 - 85)  RR: 18 (07 Oct 2024 06:30) (18 - 22)  SpO2: 95% (07 Oct 2024 06:30) (94% - 98%)    Parameters below as of 07 Oct 2024 06:30  Patient On (Oxygen Delivery Method): room air        REVIEW OF SYSTEMS:  As per HPI, otherwise unremarkable.     PHYSICAL EXAM:  Constitutional/Appearance: Normal, Well-developed  HEENT:   Normal oral mucosa, no drainage or redness, supple neck  Lymphatic: No lymphadenopathy  Cardiovascular: Normal S1 S2, No edema, II/VI [place]  Respiratory: Lungs clear to auscultation, respirations non-labored  Psychiatry: A & O x 3, appropriate affect.   Gastrointestinal:  Soft, Non-tender, no distention  Skin: No rashes, No ecchymoses, No cyanosis	  Neurologic: Non-focal, Alert and oriented x 3  Extremities: Normal range of motion  Vascular: Peripheral pulses palpable 2+ bilaterally (radial)    LABS:  CBC Full  -  ( 06 Oct 2024 00:17 )  WBC Count : 7.14 K/uL  RBC Count : 3.13 M/uL  Hemoglobin : 8.9 g/dL  Hematocrit : 28.3 %  Platelet Count - Automated : 300 K/uL  Mean Cell Volume : 90.4 fl  Mean Cell Hemoglobin : 28.4 pg  Mean Cell Hemoglobin Concentration : 31.4 gm/dL  Auto Neutrophil # : x  Auto Lymphocyte # : x  Auto Monocyte # : x  Auto Eosinophil # : x  Auto Basophil # : x  Auto Neutrophil % : x  Auto Lymphocyte % : x  Auto Monocyte % : x  Auto Eosinophil % : x  Auto Basophil % : x      10-06    137  |  99  |  16  ----------------------------<  129[H]  3.7   |  28  |  0.89    Ca    8.3[L]      06 Oct 2024 00:17  Phos  2.6     10-06  Mg     2.3     10-06      IMPRESSION AND PLAN: 74F w HL, OA, scoliosis s/p prior thoracolumbar spine surgeries now s/p mechanical fall w T12 fracture s/p T9-L3 fusion. + post-op AF <24h treated w IV lopressor back to sinus  -tele  -TTE  -metoprolol 12.5 bid  -on lovenox, but only needs full dose AC for AF if returns  -cardiology to sign off, please reconsult for further questions       35 minutes were spent on this encounter for extensive review of medical record details including labs and/or imaging studies and/or adjacent care team and consultant records, as well as review and reconciliation of current medications. Time was spent on obtaining a history, performing physical examination of patient, and answering patient and/or family questions regarding plan of care. Time was also spent discussing plan of care with patient’s other care team members including primary and consulting teams. Time also was spent on documentation of this encounter into the EHR.    ***    Matthew Ingram MD, MPhil, [facility]  Cardiologist, [facility]  ; [facility]  email: [email or web address] Cardiology and Cardiovascular Surgery on-service contact/call information, go to [email or web address] and use "[email or web address]" to login.  Outpatient Cardiology appointments, call  [phone] to arrange with a colleague; I do not have outpatient Cardiology clinic.

## 2024-10-07 NOTE — PROGRESS NOTE ADULT - ASSESSMENT
74F on celecoxib for OA last took y’day otherwise no AC/AP h/o HTN, gastroparesis, peripheral neuropathy, multiple prior thoracolumbar spine surgeries for congenital scoliosis [place]/ [place] hardware 1991 presents with back pain s/p mechanical fall from sitting y'day. Describes severe mid back pain and left hip pain, no numbness, weakness, saddle anesthesia, b/b symptoms. Pain worsened with movement, axial loading. CT TL spine w/ acute nondisplaced fracture of T12 veterbral body extending into right pedicle w/o clear cord impingement. (28 Sep 2024 15:45)    PROCEDURE: Adm 9/28. 9/29 T9-L3 Open Fusion w/PSO/lag screw, partial corpectomy w/complex plastic closure, torn dura closed.       POD#8    PLAN:  Neuro: HOB<30deg till 10/7.  OOB slowly today and observe for HA/CSF leak.  DC Blake today once OOB to TOV FU. TLSO brace when OOB. Cont Midodrine,  Flomax. Hypocalcemia-monitor. Acute anemia from surgical blood loss. Inc activity/OOB. PT/O recomm A. Rehab, PMR-P    Cardio note of 10/7-+ post-op AF <24h treated w IV lopressor back to sinus -tele -TTE -metoprolol 12.5 bid -on lovenox, but only needs full dose AC for AF if returns -cardiology to sign off, please reconsult for further questions     Medicine-Tertiary note of 9/30-Status post fixation of the thoracolumbar spine with pedicle screws and interconnecting rods. A fully threaded screw overlies the fractured T12   level as well as a cage device. Surgical clips overlie the lumbar spine. Additional wires overlie the soft tissues. ASSESSMENT: 75yo Female with Hx of OA, HTN, gastroparesis, peripheral neuropathy, multiple prior thoracolumbar spine surgeries for congential scoliosis over 10 years ago presents with back pain and vomiting after a fall from sitting. PLAN:   Patient has some bilateral tenderness in the hips since the fall. Wound recommend dedicated hip films if suspicion of fracture in the area remains high. Known Injuries:- T12 three column spine fracture-malalignment with fracture extending to the adjacent right 12th posterior rib and left T12-L1 facet joint - Acute, displaced fracture of the left eighth lateral rib - right lower lobe opacity, likely pulmonary contusion.    Respiratory: Patient instructed to use incentive spirometer [ X] YES [ ] NO              DVT ppx: [X ] SQL [ ] SQH and Venodynes [ ] Left [ ] Right [ X] Bilateral    Discharge Planning:  The patient was evaluated by PT/OT and recommended Acute Rehab.   PMR eval-P FU. She was subsequently DC on >>> in stable condition.    More than 30 minutes spent on total encounter: more than 50% of the visit was spent on educating the patient and family regarding condition, medications, follow up plans, signs and symptoms to be concerned with, preparing paperwork, and questions answered regarding discharge.

## 2024-10-08 PROCEDURE — 99232 SBSQ HOSP IP/OBS MODERATE 35: CPT

## 2024-10-08 RX ORDER — ACETAMINOPHEN 325 MG
1000 TABLET ORAL ONCE
Refills: 0 | Status: COMPLETED | OUTPATIENT
Start: 2024-10-08 | End: 2024-10-08

## 2024-10-08 RX ORDER — PRAMIPEXOLE DIHYDROCHLORIDE 0.5 MG/1
1.25 TABLET ORAL THREE TIMES A DAY
Refills: 0 | Status: DISCONTINUED | OUTPATIENT
Start: 2024-10-08 | End: 2024-10-09

## 2024-10-08 RX ORDER — PRAMIPEXOLE DIHYDROCHLORIDE 0.5 MG/1
3.75 TABLET ORAL DAILY
Refills: 0 | Status: DISCONTINUED | OUTPATIENT
Start: 2024-10-08 | End: 2024-10-08

## 2024-10-08 RX ORDER — CHLORTHALIDONE 25 MG
25 TABLET ORAL DAILY
Refills: 0 | Status: DISCONTINUED | OUTPATIENT
Start: 2024-10-08 | End: 2024-10-14

## 2024-10-08 RX ORDER — PRAMIPEXOLE DIHYDROCHLORIDE 0.5 MG/1
1.5 TABLET ORAL THREE TIMES A DAY
Refills: 0 | Status: DISCONTINUED | OUTPATIENT
Start: 2024-10-08 | End: 2024-10-08

## 2024-10-08 RX ORDER — METOPROLOL TARTRATE 50 MG
12.5 TABLET ORAL ONCE
Refills: 0 | Status: COMPLETED | OUTPATIENT
Start: 2024-10-08 | End: 2024-10-08

## 2024-10-08 RX ORDER — LOSARTAN POTASSIUM 100 MG/1
100 TABLET, FILM COATED ORAL DAILY
Refills: 0 | Status: DISCONTINUED | OUTPATIENT
Start: 2024-10-08 | End: 2024-10-14

## 2024-10-08 RX ORDER — METOPROLOL TARTRATE 50 MG
25 TABLET ORAL
Refills: 0 | Status: DISCONTINUED | OUTPATIENT
Start: 2024-10-08 | End: 2024-10-14

## 2024-10-08 RX ADMIN — ATORVASTATIN CALCIUM 40 MILLIGRAM(S): 10 TABLET, FILM COATED ORAL at 21:43

## 2024-10-08 RX ADMIN — ACYCLOVIR 1 APPLICATION(S): 50 CREAM TOPICAL at 07:58

## 2024-10-08 RX ADMIN — Medication 1 APPLICATION(S): at 11:15

## 2024-10-08 RX ADMIN — MULTI VITAMIN/MINERAL SUPPLEMENT WITH ASCORBIC ACID, NIACIN, PYRIDOXINE, PANTOTHENIC ACID, FOLIC ACID, RIBOFLAVIN, THIAMIN, BIOTIN, COBALAMIN AND ZINC. 1 TABLET(S): 60; 20; 12.5; 10; 10; 1.7; 1.5; 1; .3; .006 TABLET, COATED ORAL at 11:14

## 2024-10-08 RX ADMIN — Medication 1000 MILLIGRAM(S): at 22:45

## 2024-10-08 RX ADMIN — ACYCLOVIR 1 APPLICATION(S): 50 CREAM TOPICAL at 20:59

## 2024-10-08 RX ADMIN — ACYCLOVIR 1 APPLICATION(S): 50 CREAM TOPICAL at 11:13

## 2024-10-08 RX ADMIN — Medication 12.5 MILLIGRAM(S): at 10:42

## 2024-10-08 RX ADMIN — Medication 500 MILLIGRAM(S): at 21:43

## 2024-10-08 RX ADMIN — PREGABALIN 50 MILLIGRAM(S): 25 CAPSULE ORAL at 21:43

## 2024-10-08 RX ADMIN — MONTELUKAST SODIUM 10 MILLIGRAM(S): 10 TABLET, FILM COATED ORAL at 11:15

## 2024-10-08 RX ADMIN — PRAMIPEXOLE DIHYDROCHLORIDE 1.25 MILLIGRAM(S): 0.5 TABLET ORAL at 16:11

## 2024-10-08 RX ADMIN — OXYCODONE HYDROCHLORIDE 10 MILLIGRAM(S): 30 TABLET, FILM COATED, EXTENDED RELEASE ORAL at 12:30

## 2024-10-08 RX ADMIN — Medication 500 MILLIGRAM(S): at 13:43

## 2024-10-08 RX ADMIN — Medication 400 MILLIGRAM(S): at 09:52

## 2024-10-08 RX ADMIN — Medication 400 MILLIGRAM(S): at 21:43

## 2024-10-08 RX ADMIN — Medication 1000 MILLIGRAM(S): at 10:30

## 2024-10-08 RX ADMIN — ACYCLOVIR 1 APPLICATION(S): 50 CREAM TOPICAL at 16:11

## 2024-10-08 RX ADMIN — Medication 17 GRAM(S): at 05:25

## 2024-10-08 RX ADMIN — Medication 20 MILLIGRAM(S): at 11:14

## 2024-10-08 RX ADMIN — Medication 500 MILLIGRAM(S): at 05:25

## 2024-10-08 RX ADMIN — ACYCLOVIR 1 APPLICATION(S): 50 CREAM TOPICAL at 00:00

## 2024-10-08 RX ADMIN — ENOXAPARIN SODIUM 40 MILLIGRAM(S): 150 INJECTION SUBCUTANEOUS at 17:14

## 2024-10-08 RX ADMIN — PANTOPRAZOLE SODIUM 40 MILLIGRAM(S): 40 TABLET, DELAYED RELEASE ORAL at 11:13

## 2024-10-08 RX ADMIN — PRAMIPEXOLE DIHYDROCHLORIDE 1.25 MILLIGRAM(S): 0.5 TABLET ORAL at 21:43

## 2024-10-08 RX ADMIN — MIDODRINE HYDROCHLORIDE 10 MILLIGRAM(S): 5 TABLET ORAL at 13:43

## 2024-10-08 RX ADMIN — Medication 0.4 MILLIGRAM(S): at 21:43

## 2024-10-08 RX ADMIN — OXYCODONE HYDROCHLORIDE 10 MILLIGRAM(S): 30 TABLET, FILM COATED, EXTENDED RELEASE ORAL at 11:57

## 2024-10-08 RX ADMIN — Medication 25 MILLIGRAM(S): at 17:15

## 2024-10-08 RX ADMIN — PREGABALIN 50 MILLIGRAM(S): 25 CAPSULE ORAL at 05:26

## 2024-10-08 RX ADMIN — OXYCODONE HYDROCHLORIDE 10 MILLIGRAM(S): 30 TABLET, FILM COATED, EXTENDED RELEASE ORAL at 05:24

## 2024-10-08 RX ADMIN — PREGABALIN 50 MILLIGRAM(S): 25 CAPSULE ORAL at 13:44

## 2024-10-08 RX ADMIN — OXYCODONE HYDROCHLORIDE 10 MILLIGRAM(S): 30 TABLET, FILM COATED, EXTENDED RELEASE ORAL at 05:54

## 2024-10-08 NOTE — PROGRESS NOTE ADULT - SUBJECTIVE AND OBJECTIVE BOX
INTERVAL EVENTS/SUBJ:  continues to have intermittent AF    Home Medications:  atorvastatin 40 mg oral tablet: 1 tab(s) orally once a day (at bedtime) (30 Sep 2024 12:30)  celecoxib 100 mg oral capsule: 1 cap(s) orally 2 times a day (30 Sep 2024 12:30)  chlorthalidone 25 mg oral tablet: 1 tab(s) orally once a day (30 Sep 2024 12:30)  diclofenac 1% topical gel: Apply topically to affected area 4 times a day (30 Sep 2024 12:29)  DULoxetine 20 mg oral delayed release capsule: 1 cap(s) orally (30 Sep 2024 12:30)  ergocalciferol 1.25 mg (50,000 intl units) oral capsule: 1 cap(s) orally once a week (01 Oct 2024 15:02)  Glucosamine Chondroitin oral capsule: 3 cap(s) orally once a day (30 Sep 2024 12:30)  losartan 100 mg oral tablet: 1 tab(s) orally once a day (30 Sep 2024 12:30)  metoprolol succinate 25 mg oral capsule, extended release: 1 cap(s) orally once a day (01 Oct 2024 15:03)  montelukast 10 mg oral tablet: 1 tab(s) orally once a day (01 Oct 2024 15:02)  pantoprazole 40 mg oral granule, delayed release: 1 each orally once a day (30 Sep 2024 12:30)  pramipexole 1.5 mg oral tablet, extended release: 1 tab(s) orally 2 times a day (30 Sep 2024 12:27)  Prolia 60 mg/mL subcutaneous solution: 60 milligram(s) subcutaneously every 6 months (30 Sep 2024 12:30)  Voquenza 10m tablet daily (01 Oct 2024 08:11)      MEDICATIONS  (STANDING):  acyclovir Topical 5% Ointment 1 Application(s) Topical five times a day  atorvastatin 40 milliGRAM(s) Oral at bedtime  bisacodyl 5 milliGRAM(s) Oral at bedtime  calamine/zinc oxide Lotion 1 Application(s) Topical daily  DULoxetine 20 milliGRAM(s) Oral daily  enoxaparin Injectable 40 milliGRAM(s) SubCutaneous every 24 hours  ergocalciferol 54634 Unit(s) Oral every week  methocarbamol 500 milliGRAM(s) Oral every 8 hours  metoprolol tartrate 12.5 milliGRAM(s) Oral two times a day  midodrine 10 milliGRAM(s) Oral every 8 hours  montelukast 10 milliGRAM(s) Oral daily  multivitamin 1 Tablet(s) Oral daily  pantoprazole   Suspension 40 milliGRAM(s) Oral daily  polyethylene glycol 3350 17 Gram(s) Oral two times a day  pramipexole 1.5 milliGRAM(s) Oral two times a day  pregabalin 50 milliGRAM(s) Oral three times a day  sodium chloride 0.9%. 1000 milliLiter(s) (75 mL/Hr) IV Continuous <Continuous>  sucralfate suspension 1 Gram(s) Oral every 6 hours  tamsulosin 0.4 milliGRAM(s) Oral at bedtime  voquenza  10 mg 10 milliGRAM(s) 1 Tablet(s) Oral daily    MEDICATIONS  (PRN):  acetaminophen     Tablet .. 1000 milliGRAM(s) Oral every 8 hours PRN Mild Pain (1 - 3)  bisacodyl Suppository 10 milliGRAM(s) Rectal daily PRN Constipation  diphenhydrAMINE 25 milliGRAM(s) Oral every 8 hours PRN Rash and/or Itching  magnesium hydroxide Suspension 30 milliLiter(s) Oral every 12 hours PRN Constipation  ondansetron   Disintegrating Tablet 4 milliGRAM(s) Oral every 6 hours PRN Nausea and/or Vomiting  ondansetron Injectable 4 milliGRAM(s) IV Push every 6 hours PRN Nausea  oxyCODONE    IR 10 milliGRAM(s) Oral every 4 hours PRN Severe Pain (7 - 10)  oxyCODONE    IR 5 milliGRAM(s) Oral every 4 hours PRN Moderate Pain (4 - 6)  sodium chloride 0.65% Nasal 1 Spray(s) Both Nostrils two times a day PRN Nasal Congestion      Vital Signs Last 24 Hrs  T(C): 37.7 (08 Oct 2024 08:02), Max: 37.7 (08 Oct 2024 08:02)  T(F): 99.9 (08 Oct 2024 08:02), Max: 99.9 (08 Oct 2024 08:02)  HR: 85 (08 Oct 2024 08:02) (84 - 120)  BP: 112/66 (08 Oct 2024 08:02) (72/41 - 145/84)  BP(mean): --  RR: 18 (08 Oct 2024 08:02) (18 - 19)  SpO2: 96% (08 Oct 2024 08:02) (84% - 98%)    Parameters below as of 08 Oct 2024 08:02  Patient On (Oxygen Delivery Method): nasal cannula        REVIEW OF SYSTEMS:  As per HPI, otherwise unremarkable.     PHYSICAL EXAM:  Constitutional/Appearance: Normal, Well-developed  HEENT:   Normal oral mucosa, no drainage or redness, supple neck  Lymphatic: No lymphadenopathy  Cardiovascular: Normal S1 S2, No edema, II/VI [place]  Respiratory: Lungs clear to auscultation, respirations non-labored  Psychiatry: A & O x 3, appropriate affect.   Gastrointestinal:  Soft, Non-tender, no distention  Skin: No rashes, No ecchymoses, No cyanosis	  Neurologic: Non-focal, Alert and oriented x 3  Extremities: Normal range of motion  Vascular: Peripheral pulses palpable 2+ bilaterally (radial)    LABS:        IMPRESSION AND PLAN: 74F w HL, OA, scoliosis s/p prior thoracolumbar spine surgeries now s/p mechanical fall w T12 fracture s/p T9-L3 fusion. + post-op AF <24h treated w IV lopressor back to sinus  -tele  -TTE EF 61%  -metoprolol 12.5 bid, inc to 25 bid  -on lovenox, but only needs full dose AC for AF if returns  -outpt rhythm monitor; [id number] 3 so will need AC if AFib persistent         35 minutes were spent on this encounter for extensive review of medical record details including labs and/or imaging studies and/or adjacent care team and consultant records, as well as review and reconciliation of current medications. Time was spent on obtaining a history, performing physical examination of patient, and answering patient and/or family questions regarding plan of care. Time was also spent discussing plan of care with patient’s other care team members including primary and consulting teams. Time also was spent on documentation of this encounter into the EHR.    ***    Matthew Ingram MD, MPhil, [facility]  Cardiologist, [facility]  ; [employer] [facility]  email: [email or web address] Cardiology and Cardiovascular Surgery on-service contact/call information, go to [email or web address] and use "[email or web address]" to login.  Outpatient Cardiology appointments, call  [phone] to arrange with a colleague; I do not have outpatient Cardiology clinic.

## 2024-10-08 NOTE — PROGRESS NOTE ADULT - SUBJECTIVE AND OBJECTIVE BOX
SUBJECTIVE: Seems comfortable in NAD and w/o complaints. No CP/SOB.  No HA    OVERNIGHT EVENTS: In nd out  A. Fib to NSR    Vital Signs Last 24 Hrs  T(C): 37.7 (08 Oct 2024 08:02), Max: 37.7 (08 Oct 2024 08:02)  T(F): 99.9 (08 Oct 2024 08:02), Max: 99.9 (08 Oct 2024 08:02)  HR: 85 (08 Oct 2024 08:02) (84 - 120)  BP: 112/66 (08 Oct 2024 08:02) (72/41 - 145/84)  BP(mean): --  RR: 18 (08 Oct 2024 08:02) (18 - 19)  SpO2: 96% (08 Oct 2024 08:02) (84% - 98%)    Parameters below as of 08 Oct 2024 08:02  Patient On (Oxygen Delivery Method): nasal cannula    IVF: [ ] IVL [X ] NS 75/h  DIET: [X ] Regular [ ] CCD [ ] Renal [ ] Puree [ ] Dysphagia [ ] Tube Feeds:   PCA: [ ] YES [X ] NO   DUENAS: [ ] YES [X ] NO [ ] VOID 10/7 DC'd, Bladd Jnve=164, St Cath 700cc  BM: [X ] YES-on 10/7, 10/8    DRAINS: [ ] RADHA (cc/24h) [ X] HMV (cc/24h) DC'd 10/5 w/stich placed    PHYSICAL EXAM:    Constitutional: No Acute Distress     Neurological: Stayed awake and more involved today. AOx3.  Following Commands, Moving all Extremities     Motor exam:          Upper extremity                         Delt     Bicep     Tricep    HG                                                 R         5/5        5/5        5/5       5/5                                               L          5/5        5/5        5/5       5/5          Lower extremity                        HF         KF        KE       DF         PF                                                  R        5/5        5/5        5/5       5/5         5/5                                               L         5/5        5/5       5/5       5/5          5/5                                                 Sensation: [X] intact to light touch  [] decreased:     Pulmonary: Clear to Auscultation, No rales, No rhonchi, No wheezes     Cardiovascular: S1, S2, Regular rate and rhythm     Gastrointestinal: Soft, Non-tender, Non-distended     Extremities: No calf tenderness     Incision: Aquacel dsg DC'd. SQ Closure-CDI/Flat, no evidence of CSF leak    LABS:                        8.9    7.14  )-----------( 300      ( 06 Oct 2024 00:17 )             28.3    10-06    137  |  99  |  16  ----------------------------<  129[H]  3.7   |  28  |  0.89    Ca    8.3[L]      06 Oct 2024 00:17  Phos  2.6     10-06  Mg     2.3     10-06    < from: TTE W or WO Ultrasound Enhancing Agent (10.01.24 @ 10:43) >  CONCLUSIONS:      1. Left ventricular cavity is normal in size. Left ventricular wall thickness is normal. Left ventricular systolic function is normal with an ejection fraction of 61 % by 3D. There are no regional wall motion abnormalities seen.   2. Normal left ventricular diastolic function, with normal left ventricular filling pressure.   3. Normalright ventricular cavity size, with normal wall thickness, and normal right ventricular systolic function.   4. No pericardial effusion seen.   5. Compared to the transthoracic echocardiogram performed on 11/27/2023, there have been no significant interval changes.   6. Left ventricular global longitudinal strain is -19.8 % which is normal (< -18%). Images were acquired on a [employer] ultrasound system and processed using wywy strain analysis software with a heart rate of 74 bpm and a blood pressure of 142/63 mmHg.   7. There is normal LV mass and normal geometry.    < end of copied text >    IMAGING:  < from: CT Lumbar Spine No Cont (10.05.24 @ 12:24) >  Status post spinal fusion of T9-L3 with bilateral rods and screws and   cylindrical cage within the fractured T12 vertebral body. Resection of   the LEFT T12 pedicle and a LEFT hemilaminectomy. Hardware appears intact.   No evidence of hardware loosening. Expected postoperative changes. No   evidence of paraspinal hematoma.    < end of copied text >    < from: Xray Hip 2 Views, Bilateral (10.01.24 @ 15:53) >  IMPRESSION: No acute fracture.    < end of copied text >    < from: VA Duplex Lower Ext Vein Scan, Bilat (09.30.24 @ 16:57) >  No evidence of deep venous thrombosis in either lower extremity.    < end of copied text >    < from: CT Abdomen and Pelvis No Cont (09.28.24 @ 05:21) >  Evaluation of the thoracic, abdominal and pelvic organs and vasculature   is limited without intravenous contrast.    T12 three column spine fracture-malalignment with fracture extending to   the adjacent right 12th posterior rib and left T12-L1 facet joint.   Heterogenous spinal canal at the level of the fracture, which is   difficult to assess due to artifact. Recommend MR to assess for and/or   soft tissue/ligamentous-cord injury.    Acute, displaced fracture of the left eighth lateral rib. Chronic   appearing bilateral rib deformities. No pneumothorax. Dependent right   lower lobe opacity, which may be due to atelectasis although pulmonary   contusion is not excludedgiven adjacent injury.    < end of copied text >    MEDICATIONS  (STANDING):  acyclovir Topical 5% Ointment 1 Application(s) Topical five times a day  atorvastatin 40 milliGRAM(s) Oral at bedtime  bisacodyl 5 milliGRAM(s) Oral at bedtime  calamine/zinc oxide Lotion 1 Application(s) Topical daily  DULoxetine 20 milliGRAM(s) Oral daily  enoxaparin Injectable 40 milliGRAM(s) SubCutaneous every 24 hours  ergocalciferol 83821 Unit(s) Oral every week  methocarbamol 500 milliGRAM(s) Oral every 8 hours  metoprolol tartrate 12.5 milliGRAM(s) Oral two times a day  midodrine 10 milliGRAM(s) Oral every 8 hours  montelukast 10 milliGRAM(s) Oral daily  multivitamin 1 Tablet(s) Oral daily  pantoprazole   Suspension 40 milliGRAM(s) Oral daily  polyethylene glycol 3350 17 Gram(s) Oral two times a day  pramipexole 1.5 milliGRAM(s) Oral two times a day  pregabalin 50 milliGRAM(s) Oral three times a day  sodium chloride 0.9%. 1000 milliLiter(s) (75 mL/Hr) IV Continuous <Continuous>  sucralfate suspension 1 Gram(s) Oral every 6 hours  tamsulosin 0.4 milliGRAM(s) Oral at bedtime  voquenza  10 mg 10 milliGRAM(s) 1 Tablet(s) Oral daily    MEDICATIONS  (PRN):  acetaminophen     Tablet .. 1000 milliGRAM(s) Oral every 8 hours PRN Mild Pain (1 - 3)  bisacodyl Suppository 10 milliGRAM(s) Rectal daily PRN Constipation  diphenhydrAMINE 25 milliGRAM(s) Oral every 8 hours PRN Rash and/or Itching  magnesium hydroxide Suspension 30 milliLiter(s) Oral every 12 hours PRN Constipation  ondansetron   Disintegrating Tablet 4 milliGRAM(s) Oral every 6 hours PRN Nausea and/or Vomiting  ondansetron Injectable 4 milliGRAM(s) IV Push every 6 hours PRN Nausea  oxyCODONE    IR 10 milliGRAM(s) Oral every 4 hours PRN Severe Pain (7 - 10)  oxyCODONE    IR 5 milliGRAM(s) Oral every 4 hours PRN Moderate Pain (4 - 6)  sodium chloride 0.65% Nasal 1 Spray(s) Both Nostrils two times a day PRN Nasal Congestion

## 2024-10-08 NOTE — PROGRESS NOTE ADULT - SUBJECTIVE AND OBJECTIVE BOX
Patient is a 74y old  Female who presents with a chief complaint of fall (04 Oct 2024 17:59)    Patient resting in bed.  Complains of back spasms- not radiating.  Has been tolerating therapies - mod A transfers and gait.  No CP, no SOB, no N/V    MEDICATIONS  (STANDING):  acyclovir Topical 5% Ointment 1 Application(s) Topical five times a day  atorvastatin 40 milliGRAM(s) Oral at bedtime  bisacodyl 5 milliGRAM(s) Oral at bedtime  calamine/zinc oxide Lotion 1 Application(s) Topical daily  DULoxetine 20 milliGRAM(s) Oral daily  enoxaparin Injectable 40 milliGRAM(s) SubCutaneous every 24 hours  ergocalciferol 72825 Unit(s) Oral every week  methocarbamol 500 milliGRAM(s) Oral every 8 hours  metoprolol tartrate 12.5 milliGRAM(s) Oral two times a day  midodrine 10 milliGRAM(s) Oral every 8 hours  montelukast 10 milliGRAM(s) Oral daily  multivitamin 1 Tablet(s) Oral daily  pantoprazole   Suspension 40 milliGRAM(s) Oral daily  polyethylene glycol 3350 17 Gram(s) Oral two times a day  pramipexole 1.5 milliGRAM(s) Oral two times a day  pregabalin 50 milliGRAM(s) Oral three times a day  sodium chloride 0.9%. 1000 milliLiter(s) (75 mL/Hr) IV Continuous <Continuous>  sucralfate suspension 1 Gram(s) Oral every 6 hours  tamsulosin 0.4 milliGRAM(s) Oral at bedtime  voquenza  10 mg 10 milliGRAM(s) 1 Tablet(s) Oral daily    MEDICATIONS  (PRN):  acetaminophen     Tablet .. 1000 milliGRAM(s) Oral every 8 hours PRN Mild Pain (1 - 3)  bisacodyl Suppository 10 milliGRAM(s) Rectal daily PRN Constipation  diphenhydrAMINE 25 milliGRAM(s) Oral every 8 hours PRN Rash and/or Itching  magnesium hydroxide Suspension 30 milliLiter(s) Oral every 12 hours PRN Constipation  ondansetron   Disintegrating Tablet 4 milliGRAM(s) Oral every 6 hours PRN Nausea and/or Vomiting  ondansetron Injectable 4 milliGRAM(s) IV Push every 6 hours PRN Nausea  oxyCODONE    IR 10 milliGRAM(s) Oral every 4 hours PRN Severe Pain (7 - 10)  oxyCODONE    IR 5 milliGRAM(s) Oral every 4 hours PRN Moderate Pain (4 - 6)  sodium chloride 0.65% Nasal 1 Spray(s) Both Nostrils two times a day PRN Nasal Congestion    Vital Signs Last 24 Hrs  T(C): 37.7 (08 Oct 2024 08:02), Max: 37.7 (08 Oct 2024 08:02)  T(F): 99.9 (08 Oct 2024 08:02), Max: 99.9 (08 Oct 2024 08:02)  HR: 85 (08 Oct 2024 08:02) (84 - 120)  BP: 112/66 (08 Oct 2024 08:02) (72/41 - 145/84)  BP(mean): --  RR: 18 (08 Oct 2024 08:02) (18 - 19)  SpO2: 96% (08 Oct 2024 08:02) (84% - 98%)  Parameters below as of 08 Oct 2024 08:02  Patient On (Oxygen Delivery Method): nasal cannula    PHYSICAL EXAM  Constitutional - NAD, Comfortable  HEENT - NCAT, EOMI  Neck - Supple  Chest - On O2, No distress, no use of accessory muscles for respiration  Cardiovascular -Well perfused  Abdomen - BS+, Soft, NTND  Extremities - No C/C/E, No calf tenderness   Neurologic Exam -                 AAO x 3  Motor nml grade bl UE and LEs  No clonus   Psychiatric - Mood stable, Affect WNL    Impression:  75 yo with functional deficits secondary to diagnosis of T12 fracture    Plan:  PT- ROM, Bed Mob, Transfers, Amb w AD and bracing as needed  OT- ADLs, bracing  Prec- Falls, Cardiac  DVT Prophylaxis - Lovenox  Pain- Consider making methocarbamol standing dose at bedtime; continue Oxycodone, Pregalbin  Skin- Turn q2 h  Dispo- Acute Rehab- patient requires active and ongoing therapeutic interventions of multiple disciplines and can tolerate and benefit from 3 hours of intensive therapies x 2-4wks depending on progress at rehabilitation facility. Can actively participate and benefit from  an intensive rehabilitation program. Requires supervision by a rehabilitation physician and a coordinated interdisciplinary approach to providing rehabilitation.     d/w patient     Total time taken to review relevant records and imaging results, examine patient, write note, and, when applicable, discuss case with patient, family, , resident, medical student and other medical providers:   [  ] 25 minutes (99316)  [X] 35 minutes (72902)  [  ] 50 minutes ([id number])

## 2024-10-08 NOTE — PROGRESS NOTE ADULT - ASSESSMENT
74F on celecoxib for OA last took y’day otherwise no AC/AP h/o HTN, gastroparesis, peripheral neuropathy, multiple prior thoracolumbar spine surgeries for congenital scoliosis [place]/ [place] hardware 1991 presents with back pain s/p mechanical fall from sitting y'day. Describes severe mid back pain and left hip pain, no numbness, weakness, saddle anesthesia, b/b symptoms. Pain worsened with movement, axial loading. CT TL spine w/ acute nondisplaced fracture of T12 veterbral body extending into right pedicle w/o clear cord impingement. (28 Sep 2024 15:45)    PROCEDURE: Adm 9/28. 9/29 T9-L3 Open Fusion w/PSO/lag screw, partial corpectomy w/complex plastic closure, torn dura closed.       POD#9    PLAN:  Neuro: In and out A. Fib to NSR-cont Telem, on Metoprolol 12.5mg BID, recalled Cardio this AM-Dr MARIAH Ingram on teams and recomm Inc Metoprolol to 25 BID if BP OK.  Bladd Scan Q6h/St cath. Cont Flomax.  TLSO brace when OOB. Cont Midodrine,  Hypocalcemia ck labs AM. Acute anemia from surgical blood loss FU AM. Inc activity/OOB. PT/OT/PMR recomm A. Rehab once medically cleared.    Cardio note of 10/7-+ post-op AF <24h treated w IV lopressor back to sinus -tele -TTE -metoprolol 12.5 bid -on lovenox, but only needs full dose AC for AF if returns -cardiology to sign off, please reconsult for further questions     Tertiary note of 9/30-Status post fixation of the thoracolumbar spine with pedicle screws and interconnecting rods. A fully threaded screw overlies the fractured T12   level as well as a cage device. Surgical clips overlie the lumbar spine. Additional wires overlie the soft tissues. ASSESSMENT: 75yo Female with Hx of OA, HTN, gastroparesis, peripheral neuropathy, multiple prior thoracolumbar spine surgeries for congential scoliosis over 10 years ago presents with back pain and vomiting after a fall from sitting. PLAN:   Patient has some bilateral tenderness in the hips since the fall. Wound recommend dedicated hip films if suspicion of fracture in the area remains high. Known Injuries:- T12 three column spine fracture-malalignment with fracture extending to the adjacent right 12th posterior rib and left T12-L1 facet joint - Acute, displaced fracture of the left eighth lateral rib - right lower lobe opacity, likely pulmonary contusion.    Respiratory: Patient instructed to use incentive spirometer [ X] YES [ ] NO              DVT ppx: [X ] SQL [ ] SQH and Venodynes [ ] Left [ ] Right [ X] Bilateral    Discharge Planning:  The patient was evaluated by PT/OT and recommended Acute Rehab.   PMR eval-P FU. She was subsequently DC on >>> in stable condition.    More than 30 minutes spent on total encounter: more than 50% of the visit was spent on educating the patient and family regarding condition, medications, follow up plans, signs and symptoms to be concerned with, preparing paperwork, and questions answered regarding discharge.

## 2024-10-09 DIAGNOSIS — I48.91 UNSPECIFIED ATRIAL FIBRILLATION: ICD-10-CM

## 2024-10-09 DIAGNOSIS — Z98.890 OTHER SPECIFIED POSTPROCEDURAL STATES: ICD-10-CM

## 2024-10-09 DIAGNOSIS — D64.9 ANEMIA, UNSPECIFIED: ICD-10-CM

## 2024-10-09 LAB
ANION GAP SERPL CALC-SCNC: 10 MMOL/L — SIGNIFICANT CHANGE UP (ref 5–17)
APPEARANCE UR: CLEAR — SIGNIFICANT CHANGE UP
BACTERIA # UR AUTO: ABNORMAL /HPF
BILIRUB UR-MCNC: NEGATIVE — SIGNIFICANT CHANGE UP
BLD GP AB SCN SERPL QL: NEGATIVE — SIGNIFICANT CHANGE UP
BUN SERPL-MCNC: 15 MG/DL — SIGNIFICANT CHANGE UP (ref 7–23)
CALCIUM SERPL-MCNC: 7.5 MG/DL — LOW (ref 8.4–10.5)
CAST: 0 /LPF — SIGNIFICANT CHANGE UP (ref 0–4)
CHLORIDE SERPL-SCNC: 103 MMOL/L — SIGNIFICANT CHANGE UP (ref 96–108)
CO2 SERPL-SCNC: 24 MMOL/L — SIGNIFICANT CHANGE UP (ref 22–31)
COLOR SPEC: YELLOW — SIGNIFICANT CHANGE UP
CREAT SERPL-MCNC: 0.66 MG/DL — SIGNIFICANT CHANGE UP (ref 0.5–1.3)
DIFF PNL FLD: NEGATIVE — SIGNIFICANT CHANGE UP
EGFR: 91 ML/MIN/1.73M2 — SIGNIFICANT CHANGE UP
FERRITIN SERPL-MCNC: 217 NG/ML — SIGNIFICANT CHANGE UP (ref 13–330)
GLUCOSE SERPL-MCNC: 127 MG/DL — HIGH (ref 70–99)
GLUCOSE UR QL: NEGATIVE MG/DL — SIGNIFICANT CHANGE UP
HCT VFR BLD CALC: 25.9 % — LOW (ref 34.5–45)
HCT VFR BLD CALC: 26.2 % — LOW (ref 34.5–45)
HCT VFR BLD CALC: 26.3 % — LOW (ref 34.5–45)
HGB BLD-MCNC: 7.8 G/DL — LOW (ref 11.5–15.5)
HGB BLD-MCNC: 8.1 G/DL — LOW (ref 11.5–15.5)
HGB BLD-MCNC: 8.2 G/DL — LOW (ref 11.5–15.5)
IRON SATN MFR SERPL: 10 % — LOW (ref 14–50)
IRON SATN MFR SERPL: 26 UG/DL — LOW (ref 30–160)
KETONES UR-MCNC: NEGATIVE MG/DL — SIGNIFICANT CHANGE UP
LEUKOCYTE ESTERASE UR-ACNC: ABNORMAL
MAGNESIUM SERPL-MCNC: 2.4 MG/DL — SIGNIFICANT CHANGE UP (ref 1.6–2.6)
MCHC RBC-ENTMCNC: 27.4 PG — SIGNIFICANT CHANGE UP (ref 27–34)
MCHC RBC-ENTMCNC: 27.6 PG — SIGNIFICANT CHANGE UP (ref 27–34)
MCHC RBC-ENTMCNC: 27.7 PG — SIGNIFICANT CHANGE UP (ref 27–34)
MCHC RBC-ENTMCNC: 30.1 GM/DL — LOW (ref 32–36)
MCHC RBC-ENTMCNC: 30.8 GM/DL — LOW (ref 32–36)
MCHC RBC-ENTMCNC: 31.3 GM/DL — LOW (ref 32–36)
MCV RBC AUTO: 88.5 FL — SIGNIFICANT CHANGE UP (ref 80–100)
MCV RBC AUTO: 89.5 FL — SIGNIFICANT CHANGE UP (ref 80–100)
MCV RBC AUTO: 90.9 FL — SIGNIFICANT CHANGE UP (ref 80–100)
NITRITE UR-MCNC: NEGATIVE — SIGNIFICANT CHANGE UP
NRBC # BLD: 0 /100 WBCS — SIGNIFICANT CHANGE UP (ref 0–0)
PH UR: 8 — SIGNIFICANT CHANGE UP (ref 5–8)
PHOSPHATE SERPL-MCNC: 1.4 MG/DL — LOW (ref 2.5–4.5)
PLATELET # BLD AUTO: 324 K/UL — SIGNIFICANT CHANGE UP (ref 150–400)
PLATELET # BLD AUTO: 329 K/UL — SIGNIFICANT CHANGE UP (ref 150–400)
PLATELET # BLD AUTO: 348 K/UL — SIGNIFICANT CHANGE UP (ref 150–400)
POTASSIUM SERPL-MCNC: 3.6 MMOL/L — SIGNIFICANT CHANGE UP (ref 3.5–5.3)
POTASSIUM SERPL-SCNC: 3.6 MMOL/L — SIGNIFICANT CHANGE UP (ref 3.5–5.3)
PROT UR-MCNC: NEGATIVE MG/DL — SIGNIFICANT CHANGE UP
RBC # BLD: 2.85 M/UL — LOW (ref 3.8–5.2)
RBC # BLD: 2.94 M/UL — LOW (ref 3.8–5.2)
RBC # BLD: 2.96 M/UL — LOW (ref 3.8–5.2)
RBC # FLD: 14.7 % — HIGH (ref 10.3–14.5)
RBC # FLD: 15 % — HIGH (ref 10.3–14.5)
RBC # FLD: 15.1 % — HIGH (ref 10.3–14.5)
RBC CASTS # UR COMP ASSIST: 2 /HPF — SIGNIFICANT CHANGE UP (ref 0–4)
RH IG SCN BLD-IMP: NEGATIVE — SIGNIFICANT CHANGE UP
SODIUM SERPL-SCNC: 137 MMOL/L — SIGNIFICANT CHANGE UP (ref 135–145)
SP GR SPEC: 1 — SIGNIFICANT CHANGE UP (ref 1–1.03)
SQUAMOUS # UR AUTO: 1 /HPF — SIGNIFICANT CHANGE UP (ref 0–5)
TIBC SERPL-MCNC: 255 UG/DL — SIGNIFICANT CHANGE UP (ref 220–430)
UIBC SERPL-MCNC: 229 UG/DL — SIGNIFICANT CHANGE UP (ref 110–370)
UROBILINOGEN FLD QL: 1 MG/DL — SIGNIFICANT CHANGE UP (ref 0.2–1)
WBC # BLD: 7.1 K/UL — SIGNIFICANT CHANGE UP (ref 3.8–10.5)
WBC # BLD: 7.35 K/UL — SIGNIFICANT CHANGE UP (ref 3.8–10.5)
WBC # BLD: 7.37 K/UL — SIGNIFICANT CHANGE UP (ref 3.8–10.5)
WBC # FLD AUTO: 7.1 K/UL — SIGNIFICANT CHANGE UP (ref 3.8–10.5)
WBC # FLD AUTO: 7.35 K/UL — SIGNIFICANT CHANGE UP (ref 3.8–10.5)
WBC # FLD AUTO: 7.37 K/UL — SIGNIFICANT CHANGE UP (ref 3.8–10.5)
WBC UR QL: 38 /HPF — HIGH (ref 0–5)

## 2024-10-09 PROCEDURE — 93010 ELECTROCARDIOGRAM REPORT: CPT

## 2024-10-09 PROCEDURE — 99223 1ST HOSP IP/OBS HIGH 75: CPT

## 2024-10-09 PROCEDURE — 99232 SBSQ HOSP IP/OBS MODERATE 35: CPT

## 2024-10-09 RX ORDER — ACETAMINOPHEN 325 MG
1000 TABLET ORAL EVERY 8 HOURS
Refills: 0 | Status: DISCONTINUED | OUTPATIENT
Start: 2024-10-09 | End: 2024-10-17

## 2024-10-09 RX ORDER — ACETAMINOPHEN 325 MG
1000 TABLET ORAL ONCE
Refills: 0 | Status: COMPLETED | OUTPATIENT
Start: 2024-10-09 | End: 2024-10-09

## 2024-10-09 RX ORDER — TRAMADOL HYDROCHLORIDE 50 MG/1
50 TABLET, COATED ORAL EVERY 4 HOURS
Refills: 0 | Status: DISCONTINUED | OUTPATIENT
Start: 2024-10-09 | End: 2024-10-10

## 2024-10-09 RX ORDER — TRAMADOL HYDROCHLORIDE 50 MG/1
25 TABLET, COATED ORAL EVERY 4 HOURS
Refills: 0 | Status: DISCONTINUED | OUTPATIENT
Start: 2024-10-09 | End: 2024-10-10

## 2024-10-09 RX ORDER — MIDODRINE HYDROCHLORIDE 5 MG/1
5 TABLET ORAL
Refills: 0 | Status: DISCONTINUED | OUTPATIENT
Start: 2024-10-09 | End: 2024-10-11

## 2024-10-09 RX ORDER — ERYTHROMYCIN STEARATE 250 MG
128 TABLET ORAL EVERY 12 HOURS
Refills: 0 | Status: DISCONTINUED | OUTPATIENT
Start: 2024-10-09 | End: 2024-10-17

## 2024-10-09 RX ORDER — BISACODYL 5 MG/1
10 TABLET, COATED ORAL ONCE
Refills: 0 | Status: COMPLETED | OUTPATIENT
Start: 2024-10-09 | End: 2024-10-09

## 2024-10-09 RX ORDER — PRAMIPEXOLE DIHYDROCHLORIDE 0.5 MG/1
3.75 TABLET ORAL AT BEDTIME
Refills: 0 | Status: DISCONTINUED | OUTPATIENT
Start: 2024-10-09 | End: 2024-10-17

## 2024-10-09 RX ORDER — POTASSIUM PHOSPHATE, MONOBASIC POTASSIUM PHOSPHATE, DIBASIC 224; 236 MG/ML; MG/ML
30 INJECTION, SOLUTION, CONCENTRATE INTRAVENOUS ONCE
Refills: 0 | Status: COMPLETED | OUTPATIENT
Start: 2024-10-09 | End: 2024-10-09

## 2024-10-09 RX ORDER — PRAMIPEXOLE DIHYDROCHLORIDE 0.5 MG/1
1 TABLET ORAL
Refills: 0 | Status: DISCONTINUED | OUTPATIENT
Start: 2024-10-09 | End: 2024-10-17

## 2024-10-09 RX ORDER — DICLOFENAC SODIUM 10 MG/G
2 GEL TOPICAL
Refills: 0 | DISCHARGE

## 2024-10-09 RX ADMIN — Medication 1 GRAM(S): at 13:05

## 2024-10-09 RX ADMIN — ENOXAPARIN SODIUM 40 MILLIGRAM(S): 150 INJECTION SUBCUTANEOUS at 21:08

## 2024-10-09 RX ADMIN — PREGABALIN 50 MILLIGRAM(S): 25 CAPSULE ORAL at 13:04

## 2024-10-09 RX ADMIN — BISACODYL 10 MILLIGRAM(S): 5 TABLET, COATED ORAL at 13:03

## 2024-10-09 RX ADMIN — Medication 1000 MILLIGRAM(S): at 17:34

## 2024-10-09 RX ADMIN — Medication 25 MILLIGRAM(S): at 05:39

## 2024-10-09 RX ADMIN — Medication 20 MILLIGRAM(S): at 13:07

## 2024-10-09 RX ADMIN — Medication 17 GRAM(S): at 17:34

## 2024-10-09 RX ADMIN — PRAMIPEXOLE DIHYDROCHLORIDE 3.75 MILLIGRAM(S): 0.5 TABLET ORAL at 21:07

## 2024-10-09 RX ADMIN — Medication 0.4 MILLIGRAM(S): at 21:07

## 2024-10-09 RX ADMIN — Medication 25 MILLIGRAM(S): at 17:35

## 2024-10-09 RX ADMIN — OXYCODONE HYDROCHLORIDE 10 MILLIGRAM(S): 30 TABLET, FILM COATED, EXTENDED RELEASE ORAL at 03:08

## 2024-10-09 RX ADMIN — Medication 40 MILLIEQUIVALENT(S): at 10:28

## 2024-10-09 RX ADMIN — Medication 1000 MILLIGRAM(S): at 09:19

## 2024-10-09 RX ADMIN — MIDODRINE HYDROCHLORIDE 10 MILLIGRAM(S): 5 TABLET ORAL at 13:06

## 2024-10-09 RX ADMIN — Medication 1 APPLICATION(S): at 13:05

## 2024-10-09 RX ADMIN — PREGABALIN 50 MILLIGRAM(S): 25 CAPSULE ORAL at 21:07

## 2024-10-09 RX ADMIN — OXYCODONE HYDROCHLORIDE 10 MILLIGRAM(S): 30 TABLET, FILM COATED, EXTENDED RELEASE ORAL at 02:08

## 2024-10-09 RX ADMIN — Medication 250 MILLIGRAM(S): at 16:01

## 2024-10-09 RX ADMIN — Medication 500 MILLIGRAM(S): at 05:39

## 2024-10-09 RX ADMIN — Medication 500 MILLIGRAM(S): at 13:04

## 2024-10-09 RX ADMIN — MONTELUKAST SODIUM 10 MILLIGRAM(S): 10 TABLET, FILM COATED ORAL at 13:08

## 2024-10-09 RX ADMIN — MIDODRINE HYDROCHLORIDE 10 MILLIGRAM(S): 5 TABLET ORAL at 05:39

## 2024-10-09 RX ADMIN — TRAMADOL HYDROCHLORIDE 50 MILLIGRAM(S): 50 TABLET, COATED ORAL at 19:30

## 2024-10-09 RX ADMIN — POTASSIUM PHOSPHATE, MONOBASIC POTASSIUM PHOSPHATE, DIBASIC 83.33 MILLIMOLE(S): 224; 236 INJECTION, SOLUTION, CONCENTRATE INTRAVENOUS at 10:28

## 2024-10-09 RX ADMIN — ACYCLOVIR 1 APPLICATION(S): 50 CREAM TOPICAL at 13:04

## 2024-10-09 RX ADMIN — Medication 17 GRAM(S): at 05:39

## 2024-10-09 RX ADMIN — ATORVASTATIN CALCIUM 40 MILLIGRAM(S): 10 TABLET, FILM COATED ORAL at 21:07

## 2024-10-09 RX ADMIN — Medication 1000 MILLIGRAM(S): at 10:00

## 2024-10-09 RX ADMIN — ACYCLOVIR 1 APPLICATION(S): 50 CREAM TOPICAL at 00:19

## 2024-10-09 RX ADMIN — Medication 1000 MILLIGRAM(S): at 20:37

## 2024-10-09 RX ADMIN — TRAMADOL HYDROCHLORIDE 50 MILLIGRAM(S): 50 TABLET, COATED ORAL at 18:30

## 2024-10-09 RX ADMIN — ACYCLOVIR 1 APPLICATION(S): 50 CREAM TOPICAL at 08:34

## 2024-10-09 RX ADMIN — LOSARTAN POTASSIUM 100 MILLIGRAM(S): 100 TABLET, FILM COATED ORAL at 05:39

## 2024-10-09 RX ADMIN — Medication 500 MILLIGRAM(S): at 21:07

## 2024-10-09 RX ADMIN — Medication 1000 MILLIGRAM(S): at 22:08

## 2024-10-09 RX ADMIN — Medication 400 MILLIGRAM(S): at 19:37

## 2024-10-09 RX ADMIN — PANTOPRAZOLE SODIUM 40 MILLIGRAM(S): 40 TABLET, DELAYED RELEASE ORAL at 13:06

## 2024-10-09 RX ADMIN — Medication 1000 MILLIGRAM(S): at 21:08

## 2024-10-09 RX ADMIN — BISACODYL 5 MILLIGRAM(S): 5 TABLET, COATED ORAL at 21:07

## 2024-10-09 RX ADMIN — MULTI VITAMIN/MINERAL SUPPLEMENT WITH ASCORBIC ACID, NIACIN, PYRIDOXINE, PANTOTHENIC ACID, FOLIC ACID, RIBOFLAVIN, THIAMIN, BIOTIN, COBALAMIN AND ZINC. 1 TABLET(S): 60; 20; 12.5; 10; 10; 1.7; 1.5; 1; .3; .006 TABLET, COATED ORAL at 13:06

## 2024-10-09 RX ADMIN — PREGABALIN 50 MILLIGRAM(S): 25 CAPSULE ORAL at 05:39

## 2024-10-09 NOTE — CONSULT NOTE ADULT - ASSESSMENT
74F h/o HTN, gastroparesis, peripheral neuropathy, multiple prior thoracolumbar spine surgeries for congenital scoliosis presents with back pain s/p mechanical fall from sitting. PTA she sat down on her walker bec her legs felt fatigued and started pushing herself around but fell off curb. Found with T12 three column spine fracture-malalignment with fracture extending to the adjacent right 12th posterior rib and left T12-L1 facet joint s/p T9-L3 fusion on 9/29 c EBL 1475 cc. post-op course c/w recurrent brief Afib and anemia.

## 2024-10-09 NOTE — CONSULT NOTE ADULT - TIME BILLING
chart review, interview, physical exam, coordination with staff and consultants, medical decision making and documentation, orders

## 2024-10-09 NOTE — CONSULT NOTE ADULT - CONSULT REASON
back pain
medical mgmt
Rib fracture
Evaluate Rehabilitation Needs
Evaluate Rehabilitation Needs
afib x 1

## 2024-10-09 NOTE — CONSULT NOTE ADULT - CONSULT REQUESTED DATE/TIME
07-Oct-2024 09:30
28-Sep-2024 09:18
30-Sep-2024 10:14
09-Oct-2024
28-Sep-2024 08:46
04-Oct-2024 13:45

## 2024-10-09 NOTE — CONSULT NOTE ADULT - SUBJECTIVE AND OBJECTIVE BOX
Nick Ch  contact via pager or TEAMS  Office [phone]    HPI:  74F on celecoxib for OA last took y’day otherwise no AC/AP h/o HTN, gastroparesis, peripheral neuropathy, multiple prior thoracolumbar spine surgeries for congenital scoliosis [place]/ [place] hardware 1991 presents with back pain s/p mechanical fall from sitting y'day. Describes severe mid back pain and left hip pain, no numbness, weakness, saddle anesthesia, b/b symptoms. Pain worsened with movement, axial loading. CT TL spine w/ acute nondisplaced fracture of T12 veterbral body extending into right pedicle w/o clear cord impingement. (28 Sep 2024 15:45)      PAST MEDICAL & SURGICAL HISTORY:  H/O seasonal allergies      History of pulmonary embolism  developed after billings abigail surgery,1984 treated with coumadin 3 months, no issues after      Restless leg syndrome      GERD (gastroesophageal reflux disease)      OA (osteoarthritis)      Fungal infection of skin  under breast      H/O scoliosis      Essential hypertension      Depression      Osteoporosis      Right hip pain      2019 novel coronavirus disease (COVID-19)  Dec 2020- hospitalized for 3 days, did not require home O2, denies residual symptoms      Hiatal hernia      History of chronic pain      Peripheral neuropathy      S/P repair of paraesophageal hernia  2001      S/P spinal fusion  L4-L5 1966      Scoliosis  s/p placement of billings abigail x 2 1984      S/P spinal surgery  x 2 1985, 1986      Removal of pin, plate, abigail, or screw  1991- removal of billings abigail      S/P hysterectomy  1982      S/P hip replacement  left (2008)      S/P shoulder surgery  right (2012)      S/P dilatation and curettage  1981      H/O arthroscopy of knee  June 2021      S/P cataract surgery      History of bilateral hip replacements          Review of Systems:   CONSTITUTIONAL: No fever, weight loss, or fatigue  EYES: No eye pain, visual disturbances, or discharge  ENMT:  No difficulty hearing, tinnitus, vertigo; No sinus or throat pain  NECK: No pain or stiffness  BREASTS: No pain, masses, or nipple discharge  RESPIRATORY: No cough, wheezing, chills or hemoptysis; No shortness of breath  CARDIOVASCULAR: No chest pain, palpitations, dizziness, or leg swelling  GASTROINTESTINAL: No abdominal or epigastric pain. No nausea, vomiting, or hematemesis; No diarrhea or constipation. No melena or hematochezia.  GENITOURINARY: No dysuria, frequency, hematuria, or incontinence  NEUROLOGICAL: No headaches, memory loss, loss of strength, numbness, or tremors  SKIN: No itching, burning, rashes, or lesions   LYMPH NODES: No enlarged glands  ENDOCRINE: No heat or cold intolerance; No hair loss  MUSCULOSKELETAL: No joint pain or swelling; No muscle, back, or extremity pain  PSYCHIATRIC: No depression, anxiety, mood swings, or difficulty sleeping  HEME/LYMPH: No easy bruising, or bleeding gums  ALLERY AND IMMUNOLOGIC: No hives or eczema    Allergies    penicillins (Urticaria)  Dilantin (Urticaria)    Intolerances    erythromycin (Stomach Upset; Vomiting; Nausea)      Social History:     FAMILY HISTORY:  Family history of abdominal aortic aneurysm (AAA) (Mother)        MEDICATIONS  (STANDING):  acyclovir Topical 5% Ointment 1 Application(s) Topical five times a day  atorvastatin 40 milliGRAM(s) Oral at bedtime  bisacodyl 5 milliGRAM(s) Oral at bedtime  calamine/zinc oxide Lotion 1 Application(s) Topical daily  chlorthalidone 25 milliGRAM(s) Oral daily  DULoxetine 20 milliGRAM(s) Oral daily  enoxaparin Injectable 40 milliGRAM(s) SubCutaneous every 24 hours  ergocalciferol 82832 Unit(s) Oral every week  losartan 100 milliGRAM(s) Oral daily  methocarbamol 500 milliGRAM(s) Oral every 8 hours  metoprolol tartrate 25 milliGRAM(s) Oral two times a day  midodrine 10 milliGRAM(s) Oral every 8 hours  montelukast 10 milliGRAM(s) Oral daily  multivitamin 1 Tablet(s) Oral daily  pantoprazole   Suspension 40 milliGRAM(s) Oral daily  polyethylene glycol 3350 17 Gram(s) Oral two times a day  pramipexole 1.25 milliGRAM(s) Oral three times a day  pregabalin 50 milliGRAM(s) Oral three times a day  sucralfate suspension 1 Gram(s) Oral every 6 hours  tamsulosin 0.4 milliGRAM(s) Oral at bedtime  voquenza  10 mg 10 milliGRAM(s) 1 Tablet(s) Oral daily    MEDICATIONS  (PRN):  acetaminophen     Tablet .. 1000 milliGRAM(s) Oral every 8 hours PRN Mild Pain (1 - 3)  bisacodyl Suppository 10 milliGRAM(s) Rectal daily PRN Constipation  diphenhydrAMINE 25 milliGRAM(s) Oral every 8 hours PRN Rash and/or Itching  magnesium hydroxide Suspension 30 milliLiter(s) Oral every 12 hours PRN Constipation  ondansetron   Disintegrating Tablet 4 milliGRAM(s) Oral every 6 hours PRN Nausea and/or Vomiting  ondansetron Injectable 4 milliGRAM(s) IV Push every 6 hours PRN Nausea  oxyCODONE    IR 10 milliGRAM(s) Oral every 4 hours PRN Severe Pain (7 - 10)  oxyCODONE    IR 5 milliGRAM(s) Oral every 4 hours PRN Moderate Pain (4 - 6)  sodium chloride 0.65% Nasal 1 Spray(s) Both Nostrils two times a day PRN Nasal Congestion      Vital Signs Last 24 Hrs  T(C): 36.7 (09 Oct 2024 08:29), Max: 37.2 (08 Oct 2024 17:07)  T(F): 98.1 (09 Oct 2024 08:29), Max: 99 (08 Oct 2024 17:07)  HR: 72 (09 Oct 2024 08:29) (72 - 96)  BP: 110/67 (09 Oct 2024 08:29) (101/55 - 165/67)  BP(mean): --  RR: 18 (09 Oct 2024 08:29) (18 - 18)  SpO2: 96% (09 Oct 2024 08:29) (94% - 98%)  CAPILLARY BLOOD GLUCOSE        I&O's Summary    08 Oct 2024 07:01  -  09 Oct 2024 07:00  --------------------------------------------------------  IN: 600 mL / OUT: 2800 mL / NET: -2200 mL        PHYSICAL EXAM:  GENERAL: NAD, well-developed  HEAD:  Atraumatic, Normocephalic  EYES: EOMI, PERRLA, conjunctiva and sclera clear  NECK: Supple, No JVD  CHEST/LUNG: Clear to auscultation bilaterally; No wheeze  HEART: Regular rate and rhythm; No murmurs, rubs, or gallops  ABDOMEN: Soft, Nontender, Nondistended; Bowel sounds present  EXTREMITIES:  2+ Peripheral Pulses, No clubbing, cyanosis, or edema  PSYCH: AAOx3  NEUROLOGY: non-focal  SKIN: No rashes or lesions    LABS:                        7.8    7.35  )-----------( 324      ( 09 Oct 2024 06:51 )             25.9     10-09    137  |  103  |  15  ----------------------------<  127[H]  3.6   |  24  |  0.66    Ca    7.5[L]      09 Oct 2024 06:51  Phos  1.4     10-09  Mg     2.4     10-09            Urinalysis Basic - ( 09 Oct 2024 06:51 )    Color: x / Appearance: x / SG: x / pH: x  Gluc: 127 mg/dL / Ketone: x  / Bili: x / Urobili: x   Blood: x / Protein: x / Nitrite: x   Leuk Esterase: x / RBC: x / WBC x   Sq Epi: x / Non Sq Epi: x / Bacteria: x        RADIOLOGY & ADDITIONAL TESTS:    Imaging Personally Reviewed:    Consultant(s) Notes Reviewed:      Care Discussed with Consultants/Other Providers:   Nick Dima  contact via pager or TEAMS  Office [phone]    cc: anemia    HPI:  74F h/o HTN, gastroparesis, peripheral neuropathy, multiple prior thoracolumbar spine surgeries for congenital scoliosis presents with back pain s/p mechanical fall from sitting. PTA she sat down on her walker bec her legs felt fatigued and started pushing herself around but fell off curb. Found with T12 three column spine fracture-malalignment with fracture extending to the adjacent right 12th posterior rib and left T12-L1 facet joint s/p s/p T9-L3 fusion on 9/29. post-op course c/w recurrent brief Afib and anemia. Pt only c/o surgical site discomfort that is relieved by meds. Has chronic LE numbness      PAST MEDICAL & SURGICAL HISTORY:    gastroparesis    peripheral neuropathy      H/O seasonal allergies      History of pulmonary embolism    congenital scoliosis s/p numerous spine surgeries      Restless leg syndrome      GERD (gastroesophageal reflux disease)      OA (osteoarthritis)      Fungal infection of skin  under breast      H/O scoliosis      Essential hypertension      Depression      Osteoporosis      Right hip pain      2019 novel coronavirus disease (COVID-19)  Dec 2020- hospitalized for 3 days, did not require home O2, denies residual symptoms      Hiatal hernia      History of chronic pain        S/P repair of paraesophageal hernia  2001      S/P spinal fusion  L4-L5 1966      Scoliosis  s/p placement of billings abigail x 2 1984      S/P spinal surgery  x 2 1985, 1986      Removal of pin, plate, abigail, or screw  1991- removal of billings abigail      S/P hysterectomy  1982      S/P hip replacement  left (2008)      S/P shoulder surgery  right (2012)      S/P dilatation and curettage  1981      H/O arthroscopy of knee  June 2021      S/P cataract surgery      History of bilateral hip replacements          Review of Systems:   CONSTITUTIONAL: No fever, weight loss, or fatigue  EYES: No eye pain, visual disturbances, or discharge  ENMT:  No difficulty hearing, tinnitus, vertigo; No sinus or throat pain  NECK: No pain or stiffness  BREASTS: No pain, masses, or nipple discharge  RESPIRATORY: No cough, wheezing, chills or hemoptysis; No shortness of breath  CARDIOVASCULAR: No chest pain, palpitations, dizziness, or leg swelling  GASTROINTESTINAL: No abdominal or epigastric pain. No nausea, vomiting, or hematemesis; No diarrhea or constipation. No melena or hematochezia.  GENITOURINARY: No dysuria, frequency, hematuria, or incontinence  NEUROLOGICAL: per HPI  SKIN: No itching, burning, rashes, or lesions   LYMPH NODES: No enlarged glands  ENDOCRINE: No heat or cold intolerance; No hair loss  MUSCULOSKELETAL: No joint pain or swelling; No muscle, back, or extremity pain  PSYCHIATRIC: No depression, anxiety, mood swings, or difficulty sleeping  HEME/LYMPH: No easy bruising, or bleeding gums  ALLERY AND IMMUNOLOGIC: No hives or eczema    Allergies    penicillins (Urticaria)  Dilantin (Urticaria)    Intolerances    erythromycin (Stomach Upset; Vomiting; Nausea)      Social History:   Quit smoking 40 yrs ago    FAMILY HISTORY:  Family history of abdominal aortic aneurysm (AAA) (Mother)        MEDICATIONS  (STANDING):  acyclovir Topical 5% Ointment 1 Application(s) Topical five times a day  atorvastatin 40 milliGRAM(s) Oral at bedtime  bisacodyl 5 milliGRAM(s) Oral at bedtime  calamine/zinc oxide Lotion 1 Application(s) Topical daily  chlorthalidone 25 milliGRAM(s) Oral daily  DULoxetine 20 milliGRAM(s) Oral daily  enoxaparin Injectable 40 milliGRAM(s) SubCutaneous every 24 hours  ergocalciferol 28114 Unit(s) Oral every week  losartan 100 milliGRAM(s) Oral daily  methocarbamol 500 milliGRAM(s) Oral every 8 hours  metoprolol tartrate 25 milliGRAM(s) Oral two times a day  midodrine 10 milliGRAM(s) Oral every 8 hours  montelukast 10 milliGRAM(s) Oral daily  multivitamin 1 Tablet(s) Oral daily  pantoprazole   Suspension 40 milliGRAM(s) Oral daily  polyethylene glycol 3350 17 Gram(s) Oral two times a day  pramipexole 1.25 milliGRAM(s) Oral three times a day  pregabalin 50 milliGRAM(s) Oral three times a day  sucralfate suspension 1 Gram(s) Oral every 6 hours  tamsulosin 0.4 milliGRAM(s) Oral at bedtime  voquenza  10 mg 10 milliGRAM(s) 1 Tablet(s) Oral daily    MEDICATIONS  (PRN):  acetaminophen     Tablet .. 1000 milliGRAM(s) Oral every 8 hours PRN Mild Pain (1 - 3)  bisacodyl Suppository 10 milliGRAM(s) Rectal daily PRN Constipation  diphenhydrAMINE 25 milliGRAM(s) Oral every 8 hours PRN Rash and/or Itching  magnesium hydroxide Suspension 30 milliLiter(s) Oral every 12 hours PRN Constipation  ondansetron   Disintegrating Tablet 4 milliGRAM(s) Oral every 6 hours PRN Nausea and/or Vomiting  ondansetron Injectable 4 milliGRAM(s) IV Push every 6 hours PRN Nausea  oxyCODONE    IR 10 milliGRAM(s) Oral every 4 hours PRN Severe Pain (7 - 10)  oxyCODONE    IR 5 milliGRAM(s) Oral every 4 hours PRN Moderate Pain (4 - 6)  sodium chloride 0.65% Nasal 1 Spray(s) Both Nostrils two times a day PRN Nasal Congestion      Vital Signs Last 24 Hrs  T(C): 36.7 (09 Oct 2024 08:29), Max: 37.2 (08 Oct 2024 17:07)  T(F): 98.1 (09 Oct 2024 08:29), Max: 99 (08 Oct 2024 17:07)  HR: 72 (09 Oct 2024 08:29) (72 - 96)  BP: 110/67 (09 Oct 2024 08:29) (101/55 - 165/67)  BP(mean): --  RR: 18 (09 Oct 2024 08:29) (18 - 18)  SpO2: 96% (09 Oct 2024 08:29) (94% - 98%)  CAPILLARY BLOOD GLUCOSE        I&O's Summary    08 Oct 2024 07:01  -  09 Oct 2024 07:00  --------------------------------------------------------  IN: 600 mL / OUT: 2800 mL / NET: -2200 mL        PHYSICAL EXAM:  GENERAL: NAD, well-developed  HEAD:  Atraumatic, Normocephalic  EYES: EOMI, PERRLA, conjunctiva and sclera clear  NECK: Supple, No JVD  CHEST/LUNG: Clear to auscultation bilaterally; No wheeze  HEART: Regular rate and rhythm; No murmurs, rubs, or gallops  ABDOMEN: Soft, Nontender, Nondistended; Bowel sounds present  EXTREMITIES:  2+ Peripheral Pulses, No clubbing, cyanosis, or edema  PSYCH: AAOx3  NEUROLOGY: non-focal  SKIN: No rashes or lesions    LABS:                        7.8    7.35  )-----------( 324      ( 09 Oct 2024 06:51 )             25.9     10-09    137  |  103  |  15  ----------------------------<  127[H]  3.6   |  24  |  0.66    Ca    7.5[L]      09 Oct 2024 06:51  Phos  1.4     10-09  Mg     2.4     10-09            Urinalysis Basic - ( 09 Oct 2024 06:51 )    Color: x / Appearance: x / SG: x / pH: x  Gluc: 127 mg/dL / Ketone: x  / Bili: x / Urobili: x   Blood: x / Protein: x / Nitrite: x   Leuk Esterase: x / RBC: x / WBC x   Sq Epi: x / Non Sq Epi: x / Bacteria: x        RADIOLOGY & ADDITIONAL TESTS:    Imaging Personally Reviewed:    Consultant(s) Notes Reviewed:      Care Discussed with Consultants/Other Providers:   Nick Bergeryimarielena  contact via pager or TEAMS  Office [phone]    cc: anemia    HPI:  74F h/o HTN, gastroparesis, peripheral neuropathy, multiple prior thoracolumbar spine surgeries for congenital scoliosis presents with back pain s/p mechanical fall from sitting. PTA she sat down on her walker bec her legs felt fatigued and started pushing herself around but fell off curb. Found with T12 three column spine fracture-malalignment with fracture extending to the adjacent right 12th posterior rib and left T12-L1 facet joint s/p s/p T9-L3 fusion on 9/29 c EBL 1475 cc. post-op course c/w recurrent brief Afib and anemia. Pt only c/o surgical site discomfort that is relieved by meds. Has chronic LE numbness      PAST MEDICAL & SURGICAL HISTORY:    gastroparesis    peripheral neuropathy      H/O seasonal allergies      History of pulmonary embolism    congenital scoliosis s/p numerous spine surgeries      Restless leg syndrome      GERD (gastroesophageal reflux disease)      OA (osteoarthritis)      Fungal infection of skin  under breast      H/O scoliosis      Essential hypertension      Depression      Osteoporosis      Right hip pain      2019 novel coronavirus disease (COVID-19)  Dec 2020- hospitalized for 3 days, did not require home O2, denies residual symptoms      Hiatal hernia      History of chronic pain        S/P repair of paraesophageal hernia  2001      S/P spinal fusion  L4-L5 1966      Scoliosis  s/p placement of billings abigail x 2 1984      S/P spinal surgery  x 2 1985, 1986      Removal of pin, plate, abigail, or screw  1991- removal of billings abigail      S/P hysterectomy  1982      S/P hip replacement  left (2008)      S/P shoulder surgery  right (2012)      S/P dilatation and curettage  1981      H/O arthroscopy of knee  June 2021      S/P cataract surgery      History of bilateral hip replacements          Review of Systems:   CONSTITUTIONAL: No fever, weight loss, or fatigue  EYES: No eye pain, visual disturbances, or discharge  ENMT:  No difficulty hearing, tinnitus, vertigo; No sinus or throat pain  NECK: No pain or stiffness  BREASTS: No pain, masses, or nipple discharge  RESPIRATORY: No cough, wheezing, chills or hemoptysis; No shortness of breath  CARDIOVASCULAR: No chest pain, palpitations, dizziness, or leg swelling  GASTROINTESTINAL: No abdominal or epigastric pain. No nausea, vomiting, or hematemesis; No diarrhea or constipation. No melena or hematochezia.  GENITOURINARY: No dysuria, frequency, hematuria, or incontinence  NEUROLOGICAL: per HPI  SKIN: No itching, burning, rashes, or lesions   LYMPH NODES: No enlarged glands  ENDOCRINE: No heat or cold intolerance; No hair loss  MUSCULOSKELETAL: No joint pain or swelling; No muscle, back, or extremity pain  PSYCHIATRIC: No depression, anxiety, mood swings, or difficulty sleeping  HEME/LYMPH: No easy bruising, or bleeding gums  ALLERY AND IMMUNOLOGIC: No hives or eczema    Allergies    penicillins (Urticaria)  Dilantin (Urticaria)    Intolerances    erythromycin (Stomach Upset; Vomiting; Nausea)      Social History:   Quit smoking 40 yrs ago    FAMILY HISTORY:  Family history of abdominal aortic aneurysm (AAA) (Mother)        MEDICATIONS  (STANDING):  acyclovir Topical 5% Ointment 1 Application(s) Topical five times a day  atorvastatin 40 milliGRAM(s) Oral at bedtime  bisacodyl 5 milliGRAM(s) Oral at bedtime  calamine/zinc oxide Lotion 1 Application(s) Topical daily  chlorthalidone 25 milliGRAM(s) Oral daily  DULoxetine 20 milliGRAM(s) Oral daily  enoxaparin Injectable 40 milliGRAM(s) SubCutaneous every 24 hours  ergocalciferol 30444 Unit(s) Oral every week  losartan 100 milliGRAM(s) Oral daily  methocarbamol 500 milliGRAM(s) Oral every 8 hours  metoprolol tartrate 25 milliGRAM(s) Oral two times a day  midodrine 10 milliGRAM(s) Oral every 8 hours  montelukast 10 milliGRAM(s) Oral daily  multivitamin 1 Tablet(s) Oral daily  pantoprazole   Suspension 40 milliGRAM(s) Oral daily  polyethylene glycol 3350 17 Gram(s) Oral two times a day  pramipexole 1.25 milliGRAM(s) Oral three times a day  pregabalin 50 milliGRAM(s) Oral three times a day  sucralfate suspension 1 Gram(s) Oral every 6 hours  tamsulosin 0.4 milliGRAM(s) Oral at bedtime  voquenza  10 mg 10 milliGRAM(s) 1 Tablet(s) Oral daily    MEDICATIONS  (PRN):  acetaminophen     Tablet .. 1000 milliGRAM(s) Oral every 8 hours PRN Mild Pain (1 - 3)  bisacodyl Suppository 10 milliGRAM(s) Rectal daily PRN Constipation  diphenhydrAMINE 25 milliGRAM(s) Oral every 8 hours PRN Rash and/or Itching  magnesium hydroxide Suspension 30 milliLiter(s) Oral every 12 hours PRN Constipation  ondansetron   Disintegrating Tablet 4 milliGRAM(s) Oral every 6 hours PRN Nausea and/or Vomiting  ondansetron Injectable 4 milliGRAM(s) IV Push every 6 hours PRN Nausea  oxyCODONE    IR 10 milliGRAM(s) Oral every 4 hours PRN Severe Pain (7 - 10)  oxyCODONE    IR 5 milliGRAM(s) Oral every 4 hours PRN Moderate Pain (4 - 6)  sodium chloride 0.65% Nasal 1 Spray(s) Both Nostrils two times a day PRN Nasal Congestion      Vital Signs Last 24 Hrs  T(C): 36.7 (09 Oct 2024 08:29), Max: 37.2 (08 Oct 2024 17:07)  T(F): 98.1 (09 Oct 2024 08:29), Max: 99 (08 Oct 2024 17:07)  HR: 72 (09 Oct 2024 08:29) (72 - 96)  BP: 110/67 (09 Oct 2024 08:29) (101/55 - 165/67)  BP(mean): --  RR: 18 (09 Oct 2024 08:29) (18 - 18)  SpO2: 96% (09 Oct 2024 08:29) (94% - 98%)  CAPILLARY BLOOD GLUCOSE        I&O's Summary    08 Oct 2024 07:01  -  09 Oct 2024 07:00  --------------------------------------------------------  IN: 600 mL / OUT: 2800 mL / NET: -2200 mL        PHYSICAL EXAM:  GENERAL: NAD, well-developed  HEAD:  Atraumatic, Normocephalic  EYES: EOMI, PERRLA, conjunctiva and sclera clear  NECK: Supple, No JVD  CHEST/LUNG: Clear to auscultation bilaterally; No wheeze  HEART: Regular rate and rhythm; No murmurs, rubs, or gallops; s1, s2+  ABDOMEN: Soft, Nontender, Nondistended; Bowel sounds present  RECTAL: chaparoned by LIZZY Niño, light brown stool  EXTREMITIES:  2+ Peripheral Pulses, No clubbing, cyanosis, or edema  PSYCH: AAOx3  NEUROLOGY: non-focal  SKIN: No ecchymoses or hematomas    LABS:                        7.8    7.35  )-----------( 324      ( 09 Oct 2024 06:51 )             25.9     10-09    137  |  103  |  15  ----------------------------<  127[H]  3.6   |  24  |  0.66    Ca    7.5[L]      09 Oct 2024 06:51  Phos  1.4     10-09  Mg     2.4     10-09            Urinalysis Basic - ( 09 Oct 2024 06:51 )    Color: x / Appearance: x / SG: x / pH: x  Gluc: 127 mg/dL / Ketone: x  / Bili: x / Urobili: x   Blood: x / Protein: x / Nitrite: x   Leuk Esterase: x / RBC: x / WBC x   Sq Epi: x / Non Sq Epi: x / Bacteria: x        RADIOLOGY & ADDITIONAL TESTS:    Imaging Personally Reviewed:    Consultant(s) Notes Reviewed:      Care Discussed with Consultants/Other Providers: neurosurg   Nick Bergeryimarielena  contact via pager or TEAMS  Office [phone]    cc: anemia    HPI:  74F h/o HTN, gastroparesis, peripheral neuropathy, multiple prior thoracolumbar spine surgeries for congenital scoliosis presents with back pain s/p mechanical fall from sitting. PTA she sat down on her walker bec her legs felt fatigued and started pushing herself around but fell off curb. Found with T12 three column spine fracture-malalignment with fracture extending to the adjacent right 12th posterior rib and left T12-L1 facet joint s/p s/p T9-L3 fusion on 9/29 c EBL 1475 cc. post-op course c/w recurrent brief Afib and anemia. Pt only c/o surgical site discomfort that is relieved by meds. Has chronic LE numbness      PAST MEDICAL & SURGICAL HISTORY:    gastroparesis    peripheral neuropathy      H/O seasonal allergies      History of pulmonary embolism    congenital scoliosis s/p numerous spine surgeries      Restless leg syndrome      GERD (gastroesophageal reflux disease)      OA (osteoarthritis)      Fungal infection of skin  under breast      H/O scoliosis      Essential hypertension      Depression      Osteoporosis      Right hip pain      2019 novel coronavirus disease (COVID-19)  Dec 2020- hospitalized for 3 days, did not require home O2, denies residual symptoms      Hiatal hernia      History of chronic pain        S/P repair of paraesophageal hernia  2001      S/P spinal fusion  L4-L5 1966      Scoliosis  s/p placement of billings abigail x 2 1984      S/P spinal surgery  x 2 1985, 1986      Removal of pin, plate, abigail, or screw  1991- removal of billings abigail      S/P hysterectomy  1982      S/P hip replacement  left (2008)      S/P shoulder surgery  right (2012)      S/P dilatation and curettage  1981      H/O arthroscopy of knee  June 2021      S/P cataract surgery      History of bilateral hip replacements          Review of Systems:   CONSTITUTIONAL: No fever, weight loss, or fatigue  EYES: No eye pain, visual disturbances, or discharge  ENMT:  No difficulty hearing, tinnitus, vertigo; No sinus or throat pain  NECK: No pain or stiffness  BREASTS: No pain, masses, or nipple discharge  RESPIRATORY: No cough, wheezing, chills or hemoptysis; No shortness of breath  CARDIOVASCULAR: No chest pain, palpitations, dizziness, or leg swelling  GASTROINTESTINAL: No abdominal or epigastric pain. No nausea, vomiting, or hematemesis; No diarrhea or constipation. No melena or hematochezia.  GENITOURINARY: No dysuria, frequency, hematuria, or incontinence  NEUROLOGICAL: per HPI  SKIN: No itching, burning, rashes, or lesions   LYMPH NODES: No enlarged glands  ENDOCRINE: No heat or cold intolerance; No hair loss  MUSCULOSKELETAL: No joint pain or swelling; No muscle, back, or extremity pain  PSYCHIATRIC: No depression, anxiety, mood swings, or difficulty sleeping  HEME/LYMPH: No easy bruising, or bleeding gums  ALLERY AND IMMUNOLOGIC: No hives or eczema    Allergies    penicillins (Urticaria)  Dilantin (Urticaria)    Intolerances    erythromycin (Stomach Upset; Vomiting; Nausea)      Social History:   Quit smoking 40 yrs ago    FAMILY HISTORY:  Family history of abdominal aortic aneurysm (AAA) (Mother)    Home meds:  per med recon with addition of Erythromycin to be added inpatient      MEDICATIONS  (STANDING):  acyclovir Topical 5% Ointment 1 Application(s) Topical five times a day  atorvastatin 40 milliGRAM(s) Oral at bedtime  bisacodyl 5 milliGRAM(s) Oral at bedtime  calamine/zinc oxide Lotion 1 Application(s) Topical daily  chlorthalidone 25 milliGRAM(s) Oral daily  DULoxetine 20 milliGRAM(s) Oral daily  enoxaparin Injectable 40 milliGRAM(s) SubCutaneous every 24 hours  ergocalciferol 13588 Unit(s) Oral every week  losartan 100 milliGRAM(s) Oral daily  methocarbamol 500 milliGRAM(s) Oral every 8 hours  metoprolol tartrate 25 milliGRAM(s) Oral two times a day  midodrine 10 milliGRAM(s) Oral every 8 hours  montelukast 10 milliGRAM(s) Oral daily  multivitamin 1 Tablet(s) Oral daily  pantoprazole   Suspension 40 milliGRAM(s) Oral daily  polyethylene glycol 3350 17 Gram(s) Oral two times a day  pramipexole 1.25 milliGRAM(s) Oral three times a day  pregabalin 50 milliGRAM(s) Oral three times a day  sucralfate suspension 1 Gram(s) Oral every 6 hours  tamsulosin 0.4 milliGRAM(s) Oral at bedtime  voquenza  10 mg 10 milliGRAM(s) 1 Tablet(s) Oral daily    MEDICATIONS  (PRN):  acetaminophen     Tablet .. 1000 milliGRAM(s) Oral every 8 hours PRN Mild Pain (1 - 3)  bisacodyl Suppository 10 milliGRAM(s) Rectal daily PRN Constipation  diphenhydrAMINE 25 milliGRAM(s) Oral every 8 hours PRN Rash and/or Itching  magnesium hydroxide Suspension 30 milliLiter(s) Oral every 12 hours PRN Constipation  ondansetron   Disintegrating Tablet 4 milliGRAM(s) Oral every 6 hours PRN Nausea and/or Vomiting  ondansetron Injectable 4 milliGRAM(s) IV Push every 6 hours PRN Nausea  oxyCODONE    IR 10 milliGRAM(s) Oral every 4 hours PRN Severe Pain (7 - 10)  oxyCODONE    IR 5 milliGRAM(s) Oral every 4 hours PRN Moderate Pain (4 - 6)  sodium chloride 0.65% Nasal 1 Spray(s) Both Nostrils two times a day PRN Nasal Congestion      Vital Signs Last 24 Hrs  T(C): 36.7 (09 Oct 2024 08:29), Max: 37.2 (08 Oct 2024 17:07)  T(F): 98.1 (09 Oct 2024 08:29), Max: 99 (08 Oct 2024 17:07)  HR: 72 (09 Oct 2024 08:29) (72 - 96)  BP: 110/67 (09 Oct 2024 08:29) (101/55 - 165/67)  BP(mean): --  RR: 18 (09 Oct 2024 08:29) (18 - 18)  SpO2: 96% (09 Oct 2024 08:29) (94% - 98%)  CAPILLARY BLOOD GLUCOSE        I&O's Summary    08 Oct 2024 07:01  -  09 Oct 2024 07:00  --------------------------------------------------------  IN: 600 mL / OUT: 2800 mL / NET: -2200 mL        PHYSICAL EXAM:  GENERAL: NAD, well-developed  HEAD:  Atraumatic, Normocephalic  EYES: EOMI, PERRLA, conjunctiva and sclera clear  NECK: Supple, No JVD  CHEST/LUNG: Clear to auscultation bilaterally; No wheeze  HEART: Regular rate and rhythm; No murmurs, rubs, or gallops; s1, s2+  ABDOMEN: Soft, Nontender, Nondistended; Bowel sounds present  RECTAL: chaparoned by LIZZY Niño, light brown stool  EXTREMITIES:  2+ Peripheral Pulses, No clubbing, cyanosis, or edema  PSYCH: AAOx3  NEUROLOGY: non-focal  SKIN: No ecchymoses or hematomas    LABS:                        7.8    7.35  )-----------( 324      ( 09 Oct 2024 06:51 )             25.9     10-09    137  |  103  |  15  ----------------------------<  127[H]  3.6   |  24  |  0.66    Ca    7.5[L]      09 Oct 2024 06:51  Phos  1.4     10-09  Mg     2.4     10-09            Urinalysis Basic - ( 09 Oct 2024 06:51 )    Color: x / Appearance: x / SG: x / pH: x  Gluc: 127 mg/dL / Ketone: x  / Bili: x / Urobili: x   Blood: x / Protein: x / Nitrite: x   Leuk Esterase: x / RBC: x / WBC x   Sq Epi: x / Non Sq Epi: x / Bacteria: x        RADIOLOGY & ADDITIONAL TESTS:    Imaging Personally Reviewed:    Consultant(s) Notes Reviewed:      Care Discussed with Consultants/Other Providers: neurosurg

## 2024-10-09 NOTE — PROGRESS NOTE ADULT - ASSESSMENT
74F h/o HTN, gastroparesis, peripheral neuropathy, multiple prior thoracolumbar spine surgeries for congenital scoliosis presents with back pain s/p mechanical fall from sitting. PTA she sat down on her walker bec her legs felt fatigued and started pushing herself around but fell off curb. Found with T12 three column spine fracture-malalignment with fracture extending to the adjacent right 12th posterior rib and left T12-L1 facet joint s/p T9-L3 fusion on 9/29 c EBL 1475 cc. post-op course c/w recurrent brief Afib and anemia.      PLAN:  - neuro and vital check Q4hr  - per son at bedside, pt got too sleepy with oxycodone, which now changed to tramadol prn, pain control with tylenol, continue robaxin for muscle spasm, continue lyrica for neuropathic pain  - CT chest without contrast for upper back fluid collection, scoliosis xray pending  - restless leg syndrome, continue home dose Pramipaxole  - pAfib, cardiology following, last episode of afib on tele yesterday, continue lopressor 25mg BID  - vitals reviewed stable, midodrine titrate down to 5mg TID and continue losartan per cards  - HLD, cotninue lipitor  - continue singulair  - post op blood loss anemia, H/H 7.8/25.9 trending down from yesterday, repeat HH 8.1/26.3 stable, rectal exam negative, CT A/P pending, ok to continue SQL for DVT ppx per hospitalist  - gastroparesis, resume home dose erythromycin, continue home Voquenza  - tolerating regular diet  - BM yesterday, but pt still feels constipated, dulcolax FL given today  - activity increase as tolerated  - incentive spirometer  - DVT ppx: b/l SCDs and SQL  Discharge planning: PT/OT- [facility]    hospitalist comanagement appreciated, plan discussed with Dr. Luanne alegria [id number]

## 2024-10-09 NOTE — PROGRESS NOTE ADULT - SUBJECTIVE AND OBJECTIVE BOX
Patient was seen at bedside this am. Patient denied any dizziness, cp, sob, n/v, or abd pain.    OVERNIGHT EVENTS: no acute event overnight    Vital Signs Last 24 Hrs  T(C): 36.7 (09 Oct 2024 16:00), Max: 36.9 (08 Oct 2024 21:00)  T(F): 98 (09 Oct 2024 16:00), Max: 98.4 (08 Oct 2024 21:00)  HR: 76 (09 Oct 2024 16:00) (72 - 86)  BP: 122/67 (09 Oct 2024 16:00) (110/67 - 165/67)  BP(mean): --  RR: 18 (09 Oct 2024 16:00) (16 - 18)  SpO2: 95% (09 Oct 2024 16:00) (94% - 98%)    Parameters below as of 09 Oct 2024 16:00  Patient On (Oxygen Delivery Method): room air        I&O's Detail    08 Oct 2024 07:01  -  09 Oct 2024 07:00  --------------------------------------------------------  IN:    Oral Fluid: 600 mL  Total IN: 600 mL    OUT:    Intermittent Catheterization - Urethral (mL): 2600 mL    Voided (mL): 200 mL  Total OUT: 2800 mL    Total NET: -2200 mL      09 Oct 2024 07:01  -  09 Oct 2024 18:26  --------------------------------------------------------  IN:  Total IN: 0 mL    OUT:    Intermittent Catheterization - Urethral (mL): 1900 mL  Total OUT: 1900 mL    Total NET: -1900 mL        I&O's Summary    08 Oct 2024 07:01  -  09 Oct 2024 07:00  --------------------------------------------------------  IN: 600 mL / OUT: 2800 mL / NET: -2200 mL    09 Oct 2024 07:01  -  09 Oct 2024 18:26  --------------------------------------------------------  IN: 0 mL / OUT: 1900 mL / NET: -1900 mL        PHYSICAL EXAM:  Neurological:  awake, alert, oriented to person, place, and date, face symmetrical, following commands, moving all extremities 5/5, sensation intact to light touch  Cardiovascular: +s1, s2  Respiratory: clear to auscultation b/l  Gastrointestinal: soft, non-distended, non-tender  Genitourinary: voiding  Extremities: warm, dry  Incision/Wound: incision site c/d/i     TUBES/LINES:  [] CVC  [] A-line  [] Lumbar Drain  [] Ventriculostomy  [] Other    DIET:  [] NPO  [] Mechanical  [] Tube feeds    LABS:                        8.1    7.10  )-----------( 329      ( 09 Oct 2024 11:26 )             26.3     10    137  |  103  |  15  ----------------------------<  127[H]  3.6   |  24  |  0.66    Ca    7.5[L]      09 Oct 2024 06:51  Phos  1.4     10-09  Mg     2.4     10-09        Urinalysis Basic - ( 09 Oct 2024 14:48 )    Color: Yellow / Appearance: Clear / S.005 / pH: x  Gluc: x / Ketone: Negative mg/dL  / Bili: Negative / Urobili: 1.0 mg/dL   Blood: x / Protein: Negative mg/dL / Nitrite: Negative   Leuk Esterase: Large / RBC: 2 /HPF / WBC 38 /HPF   Sq Epi: x / Non Sq Epi: 1 /HPF / Bacteria: Many /HPF          CAPILLARY BLOOD GLUCOSE          Drug Levels: [] N/A    CSF Analysis: [] N/A      Allergies    penicillins (Urticaria)  Dilantin (Urticaria)    Intolerances    erythromycin (Stomach Upset; Vomiting; Nausea)    MEDICATIONS:  Antibiotics:    Neuro:  acetaminophen     Tablet .. 1000 milliGRAM(s) Oral every 8 hours  diphenhydrAMINE 25 milliGRAM(s) Oral every 8 hours PRN  DULoxetine 20 milliGRAM(s) Oral daily  methocarbamol 500 milliGRAM(s) Oral every 8 hours  ondansetron   Disintegrating Tablet 4 milliGRAM(s) Oral every 6 hours PRN  ondansetron Injectable 4 milliGRAM(s) IV Push every 6 hours PRN  pramipexole 3.75 milliGRAM(s) Oral at bedtime  pramipexole 1 milliGRAM(s) Oral <User Schedule>  pregabalin 50 milliGRAM(s) Oral three times a day  traMADol 25 milliGRAM(s) Oral every 4 hours PRN  traMADol 50 milliGRAM(s) Oral every 4 hours PRN    Anticoagulation:  enoxaparin Injectable 40 milliGRAM(s) SubCutaneous every 24 hours    OTHER:  acyclovir Topical 5% Ointment 1 Application(s) Topical five times a day  atorvastatin 40 milliGRAM(s) Oral at bedtime  bisacodyl 5 milliGRAM(s) Oral at bedtime  bisacodyl Suppository 10 milliGRAM(s) Rectal daily PRN  calamine/zinc oxide Lotion 1 Application(s) Topical daily  chlorthalidone 25 milliGRAM(s) Oral daily  losartan 100 milliGRAM(s) Oral daily  magnesium hydroxide Suspension 30 milliLiter(s) Oral every 12 hours PRN  metoprolol tartrate 25 milliGRAM(s) Oral two times a day  midodrine 10 milliGRAM(s) Oral every 8 hours  montelukast 10 milliGRAM(s) Oral daily  pantoprazole   Suspension 40 milliGRAM(s) Oral daily  polyethylene glycol 3350 17 Gram(s) Oral two times a day  sodium chloride 0.65% Nasal 1 Spray(s) Both Nostrils two times a day PRN  tamsulosin 0.4 milliGRAM(s) Oral at bedtime  voquenza  10 mg 10 milliGRAM(s) 1 Tablet(s) Oral daily    IVF:  ergocalciferol 99751 Unit(s) Oral every week  multivitamin 1 Tablet(s) Oral daily    CULTURES:    RADIOLOGY & ADDITIONAL TESTS:       Patient was seen at bedside this am. Patient denied any dizziness, cp, sob, n/v, or abd pain.    OVERNIGHT EVENTS: no acute event overnight    Vital Signs Last 24 Hrs  T(C): 36.7 (09 Oct 2024 16:00), Max: 36.9 (08 Oct 2024 21:00)  T(F): 98 (09 Oct 2024 16:00), Max: 98.4 (08 Oct 2024 21:00)  HR: 76 (09 Oct 2024 16:00) (72 - 86)  BP: 122/67 (09 Oct 2024 16:00) (110/67 - 165/67)  BP(mean): --  RR: 18 (09 Oct 2024 16:00) (16 - 18)  SpO2: 95% (09 Oct 2024 16:00) (94% - 98%)    Parameters below as of 09 Oct 2024 16:00  Patient On (Oxygen Delivery Method): room air        I&O's Detail    08 Oct 2024 07:01  -  09 Oct 2024 07:00  --------------------------------------------------------  IN:    Oral Fluid: 600 mL  Total IN: 600 mL    OUT:    Intermittent Catheterization - Urethral (mL): 2600 mL    Voided (mL): 200 mL  Total OUT: 2800 mL    Total NET: -2200 mL      09 Oct 2024 07:01  -  09 Oct 2024 18:26  --------------------------------------------------------  IN:  Total IN: 0 mL    OUT:    Intermittent Catheterization - Urethral (mL): 1900 mL  Total OUT: 1900 mL    Total NET: -1900 mL        I&O's Summary    08 Oct 2024 07:01  -  09 Oct 2024 07:00  --------------------------------------------------------  IN: 600 mL / OUT: 2800 mL / NET: -2200 mL    09 Oct 2024 07:01  -  09 Oct 2024 18:26  --------------------------------------------------------  IN: 0 mL / OUT: 1900 mL / NET: -1900 mL        PHYSICAL EXAM:  Neurological:  awake, alert, oriented to person, place, and date, face symmetrical, following commands, moving all extremities 5/5, sensation intact to light touch  Cardiovascular: +s1, s2  Respiratory: clear to auscultation b/l  Gastrointestinal: soft, non-distended, non-tender  Genitourinary: voiding  Extremities: warm, dry  Incision/Wound: incision site c/d/i, upper back fluid collection no leaking noted, soft to palpation    LABS:                        8.1    7.10  )-----------( 329      ( 09 Oct 2024 11:26 )             26.3     10    137  |  103  |  15  ----------------------------<  127[H]  3.6   |  24  |  0.66    Ca    7.5[L]      09 Oct 2024 06:51  Phos  1.4     10-09  Mg     2.4     10-09        Urinalysis Basic - ( 09 Oct 2024 14:48 )    Color: Yellow / Appearance: Clear / S.005 / pH: x  Gluc: x / Ketone: Negative mg/dL  / Bili: Negative / Urobili: 1.0 mg/dL   Blood: x / Protein: Negative mg/dL / Nitrite: Negative   Leuk Esterase: Large / RBC: 2 /HPF / WBC 38 /HPF   Sq Epi: x / Non Sq Epi: 1 /HPF / Bacteria: Many /HPF          CAPILLARY BLOOD GLUCOSE          Drug Levels: [] N/A    CSF Analysis: [] N/A      Allergies    penicillins (Urticaria)  Dilantin (Urticaria)    Intolerances    erythromycin (Stomach Upset; Vomiting; Nausea)    MEDICATIONS:  Antibiotics:    Neuro:  acetaminophen     Tablet .. 1000 milliGRAM(s) Oral every 8 hours  diphenhydrAMINE 25 milliGRAM(s) Oral every 8 hours PRN  DULoxetine 20 milliGRAM(s) Oral daily  methocarbamol 500 milliGRAM(s) Oral every 8 hours  ondansetron   Disintegrating Tablet 4 milliGRAM(s) Oral every 6 hours PRN  ondansetron Injectable 4 milliGRAM(s) IV Push every 6 hours PRN  pramipexole 3.75 milliGRAM(s) Oral at bedtime  pramipexole 1 milliGRAM(s) Oral <User Schedule>  pregabalin 50 milliGRAM(s) Oral three times a day  traMADol 25 milliGRAM(s) Oral every 4 hours PRN  traMADol 50 milliGRAM(s) Oral every 4 hours PRN    Anticoagulation:  enoxaparin Injectable 40 milliGRAM(s) SubCutaneous every 24 hours    OTHER:  acyclovir Topical 5% Ointment 1 Application(s) Topical five times a day  atorvastatin 40 milliGRAM(s) Oral at bedtime  bisacodyl 5 milliGRAM(s) Oral at bedtime  bisacodyl Suppository 10 milliGRAM(s) Rectal daily PRN  calamine/zinc oxide Lotion 1 Application(s) Topical daily  chlorthalidone 25 milliGRAM(s) Oral daily  losartan 100 milliGRAM(s) Oral daily  magnesium hydroxide Suspension 30 milliLiter(s) Oral every 12 hours PRN  metoprolol tartrate 25 milliGRAM(s) Oral two times a day  midodrine 10 milliGRAM(s) Oral every 8 hours  montelukast 10 milliGRAM(s) Oral daily  pantoprazole   Suspension 40 milliGRAM(s) Oral daily  polyethylene glycol 3350 17 Gram(s) Oral two times a day  sodium chloride 0.65% Nasal 1 Spray(s) Both Nostrils two times a day PRN  tamsulosin 0.4 milliGRAM(s) Oral at bedtime  voquenza  10 mg 10 milliGRAM(s) 1 Tablet(s) Oral daily    IVF:  ergocalciferol 89698 Unit(s) Oral every week  multivitamin 1 Tablet(s) Oral daily    CULTURES:    RADIOLOGY & ADDITIONAL TESTS:

## 2024-10-09 NOTE — PROGRESS NOTE ADULT - SUBJECTIVE AND OBJECTIVE BOX
Patient is a 74y old  Female who presents with a chief complaint of fall (04 Oct 2024 17:59)    Patient resting in bed.  Muscle spasms improved but still with non-radiating pain of the low back.  Has been tolerating therapies - mod A transfers and gait.  No CP, no SOB, no N/V    MEDICATIONS  (STANDING):  acyclovir Topical 5% Ointment 1 Application(s) Topical five times a day  atorvastatin 40 milliGRAM(s) Oral at bedtime  bisacodyl 5 milliGRAM(s) Oral at bedtime  calamine/zinc oxide Lotion 1 Application(s) Topical daily  chlorthalidone 25 milliGRAM(s) Oral daily  DULoxetine 20 milliGRAM(s) Oral daily  enoxaparin Injectable 40 milliGRAM(s) SubCutaneous every 24 hours  ergocalciferol 90330 Unit(s) Oral every week  losartan 100 milliGRAM(s) Oral daily  methocarbamol 500 milliGRAM(s) Oral every 8 hours  metoprolol tartrate 25 milliGRAM(s) Oral two times a day  midodrine 10 milliGRAM(s) Oral every 8 hours  montelukast 10 milliGRAM(s) Oral daily  multivitamin 1 Tablet(s) Oral daily  pantoprazole   Suspension 40 milliGRAM(s) Oral daily  polyethylene glycol 3350 17 Gram(s) Oral two times a day  pramipexole 1.25 milliGRAM(s) Oral three times a day  pregabalin 50 milliGRAM(s) Oral three times a day  sucralfate suspension 1 Gram(s) Oral every 6 hours  tamsulosin 0.4 milliGRAM(s) Oral at bedtime  voquenza  10 mg 10 milliGRAM(s) 1 Tablet(s) Oral daily    MEDICATIONS  (PRN):  acetaminophen     Tablet .. 1000 milliGRAM(s) Oral every 8 hours PRN Mild Pain (1 - 3)  bisacodyl Suppository 10 milliGRAM(s) Rectal daily PRN Constipation  diphenhydrAMINE 25 milliGRAM(s) Oral every 8 hours PRN Rash and/or Itching  magnesium hydroxide Suspension 30 milliLiter(s) Oral every 12 hours PRN Constipation  ondansetron   Disintegrating Tablet 4 milliGRAM(s) Oral every 6 hours PRN Nausea and/or Vomiting  ondansetron Injectable 4 milliGRAM(s) IV Push every 6 hours PRN Nausea  oxyCODONE    IR 10 milliGRAM(s) Oral every 4 hours PRN Severe Pain (7 - 10)  oxyCODONE    IR 5 milliGRAM(s) Oral every 4 hours PRN Moderate Pain (4 - 6)  sodium chloride 0.65% Nasal 1 Spray(s) Both Nostrils two times a day PRN Nasal Congestion    Vital Signs Last 24 Hrs  T(C): 36.7 (09 Oct 2024 08:29), Max: 37.2 (08 Oct 2024 17:07)  T(F): 98.1 (09 Oct 2024 08:29), Max: 99 (08 Oct 2024 17:07)  HR: 72 (09 Oct 2024 08:29) (72 - 96)  BP: 110/67 (09 Oct 2024 08:29) (101/55 - 165/67)  BP(mean): --  RR: 18 (09 Oct 2024 08:29) (18 - 18)  SpO2: 96% (09 Oct 2024 08:29) (94% - 98%)  Parameters below as of 09 Oct 2024 04:40  Patient On (Oxygen Delivery Method): room air    PHYSICAL EXAM  Constitutional - NAD, Comfortable  HEENT - NCAT, EOMI  Neck - Supple  Chest - On O2, No distress, no use of accessory muscles for respiration  Cardiovascular -Well perfused  Abdomen - BS+, Soft, NTND  Extremities - No C/C/E, No calf tenderness   Neurologic Exam -                 AAO x 3  Motor nml grade bl UE and LEs  No clonus   Psychiatric - Mood stable, Affect WNL                          7.8    7.35  )-----------( 324      ( 09 Oct 2024 06:51 )             25.9     10-09    137  |  103  |  15  ----------------------------<  127[H]  3.6   |  24  |  0.66    Ca    7.5[L]      09 Oct 2024 06:51  Phos  1.4     10-09  Mg     2.4     10-09    Impression:  73 yo with functional deficits secondary to diagnosis of T12 fracture    Plan:  PT- ROM, Bed Mob, Transfers, Amb w AD and bracing as needed  OT- ADLs, bracing  Prec- Falls, Cardiac  DVT Prophylaxis - Lovenox  Pain- Continue current meds.  Monitor anemia  Skin- Turn q2 h  Dispo- Acute Rehab- patient requires active and ongoing therapeutic interventions of multiple disciplines and can tolerate and benefit from 3 hours of intensive therapies x 2-4wks depending on progress at rehabilitation facility. Can actively participate and benefit from  an intensive rehabilitation program. Requires supervision by a rehabilitation physician and a coordinated interdisciplinary approach to providing rehabilitation.     d/w patient and her brother at bedside rehab options    Total time taken to review relevant records and imaging results, examine patient, write note, and, when applicable, discuss case with patient, family, , resident, medical student and other medical providers:   [  ] 25 minutes (66972)  [X] 35 minutes (93011)  [  ] 50 minutes ([id number])

## 2024-10-10 DIAGNOSIS — K76.89 OTHER SPECIFIED DISEASES OF LIVER: ICD-10-CM

## 2024-10-10 DIAGNOSIS — J90 PLEURAL EFFUSION, NOT ELSEWHERE CLASSIFIED: ICD-10-CM

## 2024-10-10 LAB
ADD ON TEST-SPECIMEN IN LAB: SIGNIFICANT CHANGE UP
ANION GAP SERPL CALC-SCNC: 11 MMOL/L — SIGNIFICANT CHANGE UP (ref 5–17)
ANION GAP SERPL CALC-SCNC: 14 MMOL/L — SIGNIFICANT CHANGE UP (ref 5–17)
BUN SERPL-MCNC: 10 MG/DL — SIGNIFICANT CHANGE UP (ref 7–23)
BUN SERPL-MCNC: 9 MG/DL — SIGNIFICANT CHANGE UP (ref 7–23)
CALCIUM SERPL-MCNC: 7.9 MG/DL — LOW (ref 8.4–10.5)
CALCIUM SERPL-MCNC: 8.2 MG/DL — LOW (ref 8.4–10.5)
CHLORIDE SERPL-SCNC: 96 MMOL/L — SIGNIFICANT CHANGE UP (ref 96–108)
CHLORIDE SERPL-SCNC: 98 MMOL/L — SIGNIFICANT CHANGE UP (ref 96–108)
CO2 SERPL-SCNC: 22 MMOL/L — SIGNIFICANT CHANGE UP (ref 22–31)
CO2 SERPL-SCNC: 22 MMOL/L — SIGNIFICANT CHANGE UP (ref 22–31)
CREAT SERPL-MCNC: 0.55 MG/DL — SIGNIFICANT CHANGE UP (ref 0.5–1.3)
CREAT SERPL-MCNC: 0.65 MG/DL — SIGNIFICANT CHANGE UP (ref 0.5–1.3)
EGFR: 91 ML/MIN/1.73M2 — SIGNIFICANT CHANGE UP
EGFR: 95 ML/MIN/1.73M2 — SIGNIFICANT CHANGE UP
GLUCOSE SERPL-MCNC: 104 MG/DL — HIGH (ref 70–99)
GLUCOSE SERPL-MCNC: 97 MG/DL — SIGNIFICANT CHANGE UP (ref 70–99)
HCT VFR BLD CALC: 26.9 % — LOW (ref 34.5–45)
HGB BLD-MCNC: 8.4 G/DL — LOW (ref 11.5–15.5)
MAGNESIUM SERPL-MCNC: 2.3 MG/DL — SIGNIFICANT CHANGE UP (ref 1.6–2.6)
MCHC RBC-ENTMCNC: 27.6 PG — SIGNIFICANT CHANGE UP (ref 27–34)
MCHC RBC-ENTMCNC: 31.2 GM/DL — LOW (ref 32–36)
MCV RBC AUTO: 88.5 FL — SIGNIFICANT CHANGE UP (ref 80–100)
NRBC # BLD: 0 /100 WBCS — SIGNIFICANT CHANGE UP (ref 0–0)
PHOSPHATE SERPL-MCNC: 1.5 MG/DL — LOW (ref 2.5–4.5)
PLATELET # BLD AUTO: 380 K/UL — SIGNIFICANT CHANGE UP (ref 150–400)
POTASSIUM SERPL-MCNC: 3.1 MMOL/L — LOW (ref 3.5–5.3)
POTASSIUM SERPL-MCNC: 4 MMOL/L — SIGNIFICANT CHANGE UP (ref 3.5–5.3)
POTASSIUM SERPL-SCNC: 3.1 MMOL/L — LOW (ref 3.5–5.3)
POTASSIUM SERPL-SCNC: 4 MMOL/L — SIGNIFICANT CHANGE UP (ref 3.5–5.3)
RBC # BLD: 3.04 M/UL — LOW (ref 3.8–5.2)
RBC # FLD: 14.8 % — HIGH (ref 10.3–14.5)
SODIUM SERPL-SCNC: 131 MMOL/L — LOW (ref 135–145)
SODIUM SERPL-SCNC: 132 MMOL/L — LOW (ref 135–145)
WBC # BLD: 7.23 K/UL — SIGNIFICANT CHANGE UP (ref 3.8–10.5)
WBC # FLD AUTO: 7.23 K/UL — SIGNIFICANT CHANGE UP (ref 3.8–10.5)

## 2024-10-10 PROCEDURE — 99232 SBSQ HOSP IP/OBS MODERATE 35: CPT

## 2024-10-10 PROCEDURE — 71260 CT THORAX DX C+: CPT | Mod: 26

## 2024-10-10 PROCEDURE — 74177 CT ABD & PELVIS W/CONTRAST: CPT | Mod: 26

## 2024-10-10 RX ORDER — SODIUM CHLORIDE 0.9 % (FLUSH) 0.9 %
2 SYRINGE (ML) INJECTION ONCE
Refills: 0 | Status: COMPLETED | OUTPATIENT
Start: 2024-10-10 | End: 2024-10-10

## 2024-10-10 RX ORDER — OXYCODONE HYDROCHLORIDE 30 MG/1
5 TABLET, FILM COATED, EXTENDED RELEASE ORAL
Refills: 0 | Status: DISCONTINUED | OUTPATIENT
Start: 2024-10-10 | End: 2024-10-11

## 2024-10-10 RX ORDER — OXYCODONE HYDROCHLORIDE 30 MG/1
2.5 TABLET, FILM COATED, EXTENDED RELEASE ORAL EVERY 4 HOURS
Refills: 0 | Status: DISCONTINUED | OUTPATIENT
Start: 2024-10-10 | End: 2024-10-11

## 2024-10-10 RX ORDER — SODIUM PHOSPHATE IN D5W 15MMOL/250
30 PLASTIC BAG, INJECTION (ML) INTRAVENOUS ONCE
Refills: 0 | Status: COMPLETED | OUTPATIENT
Start: 2024-10-10 | End: 2024-10-10

## 2024-10-10 RX ORDER — FUROSEMIDE 10 MG/ML
20 INJECTION INTRAVENOUS ONCE
Refills: 0 | Status: COMPLETED | OUTPATIENT
Start: 2024-10-10 | End: 2024-10-10

## 2024-10-10 RX ORDER — SODIUM CHLORIDE 0.9 % (FLUSH) 0.9 %
2 SYRINGE (ML) INJECTION EVERY 8 HOURS
Refills: 0 | Status: DISCONTINUED | OUTPATIENT
Start: 2024-10-10 | End: 2024-10-10

## 2024-10-10 RX ADMIN — PREGABALIN 50 MILLIGRAM(S): 25 CAPSULE ORAL at 05:14

## 2024-10-10 RX ADMIN — OXYCODONE HYDROCHLORIDE 5 MILLIGRAM(S): 30 TABLET, FILM COATED, EXTENDED RELEASE ORAL at 11:49

## 2024-10-10 RX ADMIN — OXYCODONE HYDROCHLORIDE 5 MILLIGRAM(S): 30 TABLET, FILM COATED, EXTENDED RELEASE ORAL at 06:21

## 2024-10-10 RX ADMIN — FUROSEMIDE 20 MILLIGRAM(S): 10 INJECTION INTRAVENOUS at 17:27

## 2024-10-10 RX ADMIN — OXYCODONE HYDROCHLORIDE 2.5 MILLIGRAM(S): 30 TABLET, FILM COATED, EXTENDED RELEASE ORAL at 10:45

## 2024-10-10 RX ADMIN — Medication 128 MILLIGRAM(S): at 17:29

## 2024-10-10 RX ADMIN — Medication 2 GRAM(S): at 11:49

## 2024-10-10 RX ADMIN — Medication 40 MILLIEQUIVALENT(S): at 21:08

## 2024-10-10 RX ADMIN — Medication 1000 MILLIGRAM(S): at 21:05

## 2024-10-10 RX ADMIN — ENOXAPARIN SODIUM 40 MILLIGRAM(S): 150 INJECTION SUBCUTANEOUS at 17:27

## 2024-10-10 RX ADMIN — PRAMIPEXOLE DIHYDROCHLORIDE 1 MILLIGRAM(S): 0.5 TABLET ORAL at 05:16

## 2024-10-10 RX ADMIN — OXYCODONE HYDROCHLORIDE 2.5 MILLIGRAM(S): 30 TABLET, FILM COATED, EXTENDED RELEASE ORAL at 10:15

## 2024-10-10 RX ADMIN — Medication 1000 MILLIGRAM(S): at 14:00

## 2024-10-10 RX ADMIN — Medication 1000 MILLIGRAM(S): at 22:05

## 2024-10-10 RX ADMIN — PRAMIPEXOLE DIHYDROCHLORIDE 3.75 MILLIGRAM(S): 0.5 TABLET ORAL at 21:07

## 2024-10-10 RX ADMIN — Medication 25 MILLIGRAM(S): at 22:32

## 2024-10-10 RX ADMIN — Medication 1000 MILLIGRAM(S): at 05:14

## 2024-10-10 RX ADMIN — LOSARTAN POTASSIUM 100 MILLIGRAM(S): 100 TABLET, FILM COATED ORAL at 05:14

## 2024-10-10 RX ADMIN — Medication 40 MILLIEQUIVALENT(S): at 18:20

## 2024-10-10 RX ADMIN — Medication 500 MILLIGRAM(S): at 05:14

## 2024-10-10 RX ADMIN — OXYCODONE HYDROCHLORIDE 5 MILLIGRAM(S): 30 TABLET, FILM COATED, EXTENDED RELEASE ORAL at 07:20

## 2024-10-10 RX ADMIN — OXYCODONE HYDROCHLORIDE 2.5 MILLIGRAM(S): 30 TABLET, FILM COATED, EXTENDED RELEASE ORAL at 19:14

## 2024-10-10 RX ADMIN — OXYCODONE HYDROCHLORIDE 5 MILLIGRAM(S): 30 TABLET, FILM COATED, EXTENDED RELEASE ORAL at 19:14

## 2024-10-10 RX ADMIN — OXYCODONE HYDROCHLORIDE 5 MILLIGRAM(S): 30 TABLET, FILM COATED, EXTENDED RELEASE ORAL at 00:40

## 2024-10-10 RX ADMIN — Medication 17 GRAM(S): at 05:16

## 2024-10-10 RX ADMIN — Medication 25 MILLIGRAM(S): at 05:15

## 2024-10-10 RX ADMIN — Medication 1000 MILLIGRAM(S): at 06:15

## 2024-10-10 RX ADMIN — Medication 2 GRAM(S): at 21:08

## 2024-10-10 RX ADMIN — Medication 500 MILLIGRAM(S): at 21:05

## 2024-10-10 RX ADMIN — Medication 0.4 MILLIGRAM(S): at 21:05

## 2024-10-10 RX ADMIN — Medication 85 MILLIMOLE(S): at 14:01

## 2024-10-10 RX ADMIN — Medication 128 MILLIGRAM(S): at 06:21

## 2024-10-10 RX ADMIN — OXYCODONE HYDROCHLORIDE 2.5 MILLIGRAM(S): 30 TABLET, FILM COATED, EXTENDED RELEASE ORAL at 22:30

## 2024-10-10 RX ADMIN — OXYCODONE HYDROCHLORIDE 2.5 MILLIGRAM(S): 30 TABLET, FILM COATED, EXTENDED RELEASE ORAL at 23:30

## 2024-10-10 RX ADMIN — OXYCODONE HYDROCHLORIDE 5 MILLIGRAM(S): 30 TABLET, FILM COATED, EXTENDED RELEASE ORAL at 15:41

## 2024-10-10 RX ADMIN — PREGABALIN 50 MILLIGRAM(S): 25 CAPSULE ORAL at 21:05

## 2024-10-10 RX ADMIN — OXYCODONE HYDROCHLORIDE 2.5 MILLIGRAM(S): 30 TABLET, FILM COATED, EXTENDED RELEASE ORAL at 18:20

## 2024-10-10 RX ADMIN — Medication 1000 MILLIGRAM(S): at 14:30

## 2024-10-10 RX ADMIN — ATORVASTATIN CALCIUM 40 MILLIGRAM(S): 10 TABLET, FILM COATED ORAL at 21:05

## 2024-10-10 RX ADMIN — Medication 25 MILLIGRAM(S): at 05:14

## 2024-10-10 RX ADMIN — Medication 25 MILLIGRAM(S): at 17:27

## 2024-10-10 RX ADMIN — OXYCODONE HYDROCHLORIDE 5 MILLIGRAM(S): 30 TABLET, FILM COATED, EXTENDED RELEASE ORAL at 01:40

## 2024-10-10 RX ADMIN — Medication 500 MILLIGRAM(S): at 13:59

## 2024-10-10 NOTE — PROGRESS NOTE ADULT - SUBJECTIVE AND OBJECTIVE BOX
Patient was seen at bedside this am. Patient said her back pain was better when she was sitting in a chair. Denied any cp, sob, n/v, or abd pain.    OVERNIGHT EVENTS: no acute event overnight    Vital Signs Last 24 Hrs  T(C): 36.4 (10 Oct 2024 13:19), Max: 36.9 (09 Oct 2024 21:24)  T(F): 97.6 (10 Oct 2024 13:19), Max: 98.4 (09 Oct 2024 21:24)  HR: 70 (10 Oct 2024 13:19) (70 - 83)  BP: 120/68 (10 Oct 2024 13:19) (120/68 - 158/73)  BP(mean): --  RR: 18 (10 Oct 2024 13:19) (18 - 18)  SpO2: 94% (10 Oct 2024 13:19) (94% - 96%)    Parameters below as of 10 Oct 2024 05:12  Patient On (Oxygen Delivery Method): room air        I&O's Detail    09 Oct 2024 07:01  -  10 Oct 2024 07:00  --------------------------------------------------------  IN:  Total IN: 0 mL    OUT:    Indwelling Catheter - Urethral (mL): 2400 mL    Intermittent Catheterization - Urethral (mL): 1900 mL  Total OUT: 4300 mL    Total NET: -4300 mL      10 Oct 2024 07:01  -  10 Oct 2024 15:41  --------------------------------------------------------  IN:  Total IN: 0 mL    OUT:    Indwelling Catheter - Urethral (mL): 700 mL  Total OUT: 700 mL    Total NET: -700 mL        I&O's Summary    09 Oct 2024 07:01  -  10 Oct 2024 07:00  --------------------------------------------------------  IN: 0 mL / OUT: 4300 mL / NET: -4300 mL    10 Oct 2024 07:01  -  10 Oct 2024 15:41  --------------------------------------------------------  IN: 0 mL / OUT: 700 mL / NET: -700 mL        PHYSICAL EXAM:  Neurological:  easily arousable to voice, oriented to person, place, and date, PERRL, face symmetric, following commands, moving all extremities 5/5, sensation intact to light   Cardiovascular: +s1, s2  Respiratory: clear to auscultation b/l  Gastrointestinal: soft, non-distended, non-tender  Genitourinary: dumont  Extremities: warm, dry  Incision/Wound: incision site C/D/I    LABS:                        8.4    7.23  )-----------( 380      ( 10 Oct 2024 07:45 )             26.9     10-10    131[L]  |  98  |  10  ----------------------------<  104[H]  4.0   |  22  |  0.55    Ca    7.9[L]      10 Oct 2024 07:46  Phos  1.5     10-10  Mg     2.3     10-10        Urinalysis Basic - ( 10 Oct 2024 07:46 )    Color: x / Appearance: x / SG: x / pH: x  Gluc: 104 mg/dL / Ketone: x  / Bili: x / Urobili: x   Blood: x / Protein: x / Nitrite: x   Leuk Esterase: x / RBC: x / WBC x   Sq Epi: x / Non Sq Epi: x / Bacteria: x          CAPILLARY BLOOD GLUCOSE          Drug Levels: [] N/A    CSF Analysis: [] N/A      Allergies    penicillins (Urticaria)  Dilantin (Urticaria)    Intolerances    erythromycin (Stomach Upset; Vomiting; Nausea)    MEDICATIONS:  Antibiotics:  erythromycin    ethylsuccinate Suspension 40 mG/mL 128 milliGRAM(s) Oral every 12 hours    Neuro:  acetaminophen     Tablet .. 1000 milliGRAM(s) Oral every 8 hours  diphenhydrAMINE 25 milliGRAM(s) Oral every 8 hours PRN  DULoxetine 20 milliGRAM(s) Oral daily  methocarbamol 500 milliGRAM(s) Oral every 8 hours  oxyCODONE    IR 5 milliGRAM(s) Oral four times a day PRN  oxyCODONE    IR 2.5 milliGRAM(s) Oral every 4 hours PRN  pramipexole 3.75 milliGRAM(s) Oral at bedtime  pramipexole 1 milliGRAM(s) Oral <User Schedule>  pregabalin 50 milliGRAM(s) Oral three times a day    Anticoagulation:  enoxaparin Injectable 40 milliGRAM(s) SubCutaneous every 24 hours    OTHER:  acyclovir Topical 5% Ointment 1 Application(s) Topical five times a day  atorvastatin 40 milliGRAM(s) Oral at bedtime  calamine/zinc oxide Lotion 1 Application(s) Topical daily  chlorthalidone 25 milliGRAM(s) Oral daily  furosemide   Injectable 20 milliGRAM(s) IV Push once  losartan 100 milliGRAM(s) Oral daily  metoprolol tartrate 25 milliGRAM(s) Oral two times a day  midodrine 5 milliGRAM(s) Oral <User Schedule>  montelukast 10 milliGRAM(s) Oral daily  polyethylene glycol 3350 17 Gram(s) Oral two times a day  sodium chloride 0.65% Nasal 1 Spray(s) Both Nostrils two times a day PRN  tamsulosin 0.4 milliGRAM(s) Oral at bedtime  voquenza  10 mg 10 milliGRAM(s) 1 Tablet(s) Oral daily    IVF:  ergocalciferol 98173 Unit(s) Oral every week  multivitamin 1 Tablet(s) Oral daily    CULTURES:    RADIOLOGY & ADDITIONAL TESTS:

## 2024-10-10 NOTE — PROGRESS NOTE ADULT - SUBJECTIVE AND OBJECTIVE BOX
INTERVAL EVENTS/SUBJ:  No events     Home Medications:  atorvastatin 40 mg oral tablet: 1 tab(s) orally once a day (at bedtime) (30 Sep 2024 12:30)  celecoxib 100 mg oral capsule: 1 cap(s) orally 2 times a day (30 Sep 2024 12:30)  chlorthalidone 25 mg oral tablet: 1 tab(s) orally once a day (30 Sep 2024 12:30)  DULoxetine 20 mg oral delayed release capsule: 1 cap(s) orally (30 Sep 2024 12:30)  ergocalciferol 1.25 mg (50,000 intl units) oral capsule: 1 cap(s) orally once a week (01 Oct 2024 15:02)  Glucosamine Chondroitin oral capsule: 3 cap(s) orally once a day (30 Sep 2024 12:30)  losartan 100 mg oral tablet: 1 tab(s) orally once a day (30 Sep 2024 12:30)  metoprolol succinate 25 mg oral capsule, extended release: 1 cap(s) orally once a day (01 Oct 2024 15:03)  montelukast 10 mg oral tablet: 1 tab(s) orally once a day (01 Oct 2024 15:02)  pramipexole 1 mg oral tablet: 1 tab(s) orally once a day a.m. (09 Oct 2024 16:03)  pramipexole 1.5 mg oral tablet, extended release: 1 tab(s) orally 2 times a day (30 Sep 2024 12:27)  pramipexole 3.75 mg oral tablet, extended release: 1 tab(s) orally once a day (at bedtime) (09 Oct 2024 16:03)  Prolia 60 mg/mL subcutaneous solution: 60 milligram(s) subcutaneously every 6 months (30 Sep 2024 12:30)  senna:  (09 Oct 2024 16:03)  senna (sennosides) 8.6 mg oral tablet: 1 tab(s) orally once a day (09 Oct 2024 16:03)  Voquenza 10m tablet daily (01 Oct 2024 08:11)      MEDICATIONS  (STANDING):  acetaminophen     Tablet .. 1000 milliGRAM(s) Oral every 8 hours  acyclovir Topical 5% Ointment 1 Application(s) Topical five times a day  atorvastatin 40 milliGRAM(s) Oral at bedtime  calamine/zinc oxide Lotion 1 Application(s) Topical daily  chlorthalidone 25 milliGRAM(s) Oral daily  DULoxetine 20 milliGRAM(s) Oral daily  enoxaparin Injectable 40 milliGRAM(s) SubCutaneous every 24 hours  ergocalciferol 62770 Unit(s) Oral every week  erythromycin    ethylsuccinate Suspension 40 mG/mL 128 milliGRAM(s) Oral every 12 hours  losartan 100 milliGRAM(s) Oral daily  methocarbamol 500 milliGRAM(s) Oral every 8 hours  metoprolol tartrate 25 milliGRAM(s) Oral two times a day  midodrine 5 milliGRAM(s) Oral <User Schedule>  montelukast 10 milliGRAM(s) Oral daily  multivitamin 1 Tablet(s) Oral daily  polyethylene glycol 3350 17 Gram(s) Oral two times a day  pramipexole 3.75 milliGRAM(s) Oral at bedtime  pramipexole 1 milliGRAM(s) Oral <User Schedule>  pregabalin 50 milliGRAM(s) Oral three times a day  tamsulosin 0.4 milliGRAM(s) Oral at bedtime  voquenza  10 mg 10 milliGRAM(s) 1 Tablet(s) Oral daily    MEDICATIONS  (PRN):  diphenhydrAMINE 25 milliGRAM(s) Oral every 8 hours PRN Rash and/or Itching  oxyCODONE    IR 2.5 milliGRAM(s) Oral every 4 hours PRN Moderate Pain (4 - 6)  oxyCODONE    IR 5 milliGRAM(s) Oral four times a day PRN Severe Pain (7 - 10)  sodium chloride 0.65% Nasal 1 Spray(s) Both Nostrils two times a day PRN Nasal Congestion      Vital Signs Last 24 Hrs  T(C): 36.4 (10 Oct 2024 13:19), Max: 36.9 (09 Oct 2024 21:24)  T(F): 97.6 (10 Oct 2024 13:19), Max: 98.4 (09 Oct 2024 21:24)  HR: 70 (10 Oct 2024 13:19) (70 - 83)  BP: 120/68 (10 Oct 2024 13:19) (120/68 - 158/73)  BP(mean): --  RR: 18 (10 Oct 2024 13:19) (18 - 18)  SpO2: 94% (10 Oct 2024 13:19) (94% - 96%)    Parameters below as of 10 Oct 2024 05:12  Patient On (Oxygen Delivery Method): room air        REVIEW OF SYSTEMS:  As per HPI, otherwise unremarkable.     PHYSICAL EXAM:  Constitutional/Appearance: Normal, Well-developed  HEENT:   Normal oral mucosa, no drainage or redness, supple neck  Lymphatic: No lymphadenopathy  Cardiovascular: Normal S1 S2, No edema, II/VI [place]  Respiratory: Lungs clear to auscultation, respirations non-labored  Psychiatry: A & O x 3, appropriate affect.   Gastrointestinal:  Soft, Non-tender, no distention  Skin: No rashes, No ecchymoses, No cyanosis	  Neurologic: Non-focal, Alert and oriented x 3  Extremities: Normal range of motion  Vascular: Peripheral pulses palpable 2+ bilaterally (radial)    LABS:  CBC Full  -  ( 10 Oct 2024 07:45 )  WBC Count : 7.23 K/uL  RBC Count : 3.04 M/uL  Hemoglobin : 8.4 g/dL  Hematocrit : 26.9 %  Platelet Count - Automated : 380 K/uL  Mean Cell Volume : 88.5 fl  Mean Cell Hemoglobin : 27.6 pg  Mean Cell Hemoglobin Concentration : 31.2 gm/dL  Auto Neutrophil # : x  Auto Lymphocyte # : x  Auto Monocyte # : x  Auto Eosinophil # : x  Auto Basophil # : x  Auto Neutrophil % : x  Auto Lymphocyte % : x  Auto Monocyte % : x  Auto Eosinophil % : x  Auto Basophil % : x      10-10    131[L]  |  98  |  10  ----------------------------<  104[H]  4.0   |  22  |  0.55    Ca    7.9[L]      10 Oct 2024 07:46  Phos  1.5     10-10  Mg     2.3     10-10    IMPRESSION AND PLAN: 74F w HL, OA, scoliosis s/p prior thoracolumbar spine surgeries now s/p mechanical fall w T12 fracture s/p T9-L3 fusion. + post-op AF <24h treated w IV lopressor back to sinus  -tele  -TTE EF 61%  -metoprolol 12.5 bid, inc to 25 bid  -slight volume up w pleural effusions, trial 1x lasix 20mg IV  -on lovenox, but only needs full dose AC for AF if returns  -outpt rhythm monitor; [id number] 3 so will need AC if AFib persistent         35 minutes were spent on this encounter for extensive review of medical record details including labs and/or imaging studies and/or adjacent care team and consultant records, as well as review and reconciliation of current medications. Time was spent on obtaining a history, performing physical examination of patient, and answering patient and/or family questions regarding plan of care. Time was also spent discussing plan of care with patient’s other care team members including primary and consulting teams. Time also was spent on documentation of this encounter into the EHR.    ***    Matthew Ingram MD, MPhil, [facility]  Cardiologist, [facility]  ; [employer] at [facility]  email: [email or web address] Cardiovascular Surgery on-service contact/call information, go to [email or web address] and use "[email or web address]" to login.  Outpatient Cardiology appointments, call  [phone] to arrange with a colleague; I do not have outpatient Cardiology clinic.

## 2024-10-10 NOTE — PROGRESS NOTE ADULT - SUBJECTIVE AND OBJECTIVE BOX
Nick Ch   contact via pager or TEAMS        CC: Patient is a 74y old  Female who presents with a chief complaint of fall (04 Oct 2024 17:59)      SUBJECTIVE / OVERNIGHT EVENTS:    MEDICATIONS  (STANDING):  acetaminophen     Tablet .. 1000 milliGRAM(s) Oral every 8 hours  acyclovir Topical 5% Ointment 1 Application(s) Topical five times a day  atorvastatin 40 milliGRAM(s) Oral at bedtime  calamine/zinc oxide Lotion 1 Application(s) Topical daily  chlorthalidone 25 milliGRAM(s) Oral daily  DULoxetine 20 milliGRAM(s) Oral daily  enoxaparin Injectable 40 milliGRAM(s) SubCutaneous every 24 hours  ergocalciferol 41994 Unit(s) Oral every week  erythromycin    ethylsuccinate Suspension 40 mG/mL 128 milliGRAM(s) Oral every 12 hours  losartan 100 milliGRAM(s) Oral daily  methocarbamol 500 milliGRAM(s) Oral every 8 hours  metoprolol tartrate 25 milliGRAM(s) Oral two times a day  midodrine 5 milliGRAM(s) Oral <User Schedule>  montelukast 10 milliGRAM(s) Oral daily  multivitamin 1 Tablet(s) Oral daily  polyethylene glycol 3350 17 Gram(s) Oral two times a day  pramipexole 3.75 milliGRAM(s) Oral at bedtime  pramipexole 1 milliGRAM(s) Oral <User Schedule>  pregabalin 50 milliGRAM(s) Oral three times a day  tamsulosin 0.4 milliGRAM(s) Oral at bedtime  voquenza  10 mg 10 milliGRAM(s) 1 Tablet(s) Oral daily    MEDICATIONS  (PRN):  diphenhydrAMINE 25 milliGRAM(s) Oral every 8 hours PRN Rash and/or Itching  oxyCODONE    IR 2.5 milliGRAM(s) Oral every 4 hours PRN Moderate Pain (4 - 6)  oxyCODONE    IR 5 milliGRAM(s) Oral four times a day PRN Severe Pain (7 - 10)  sodium chloride 0.65% Nasal 1 Spray(s) Both Nostrils two times a day PRN Nasal Congestion      Vital Signs Last 24 Hrs  T(C): 36.8 (10 Oct 2024 08:20), Max: 36.9 (09 Oct 2024 21:24)  T(F): 98.2 (10 Oct 2024 08:20), Max: 98.4 (09 Oct 2024 21:24)  HR: 73 (10 Oct 2024 08:20) (73 - 83)  BP: 143/76 (10 Oct 2024 08:20) (122/67 - 158/73)  BP(mean): --  RR: 18 (10 Oct 2024 08:20) (16 - 18)  SpO2: 96% (10 Oct 2024 08:20) (94% - 96%)  CAPILLARY BLOOD GLUCOSE        I&O's Summary    09 Oct 2024 07:01  -  10 Oct 2024 07:00  --------------------------------------------------------  IN: 0 mL / OUT: 4300 mL / NET: -4300 mL      tele:    PHYSICAL EXAM:    GENERAL: NAD   HEENT: EOMI, PERRL  PULM: Clear to auscultation bilaterally  CV: Regular rate and rhythm; nl S1, S2; No murmurs, rubs, or gallops  ABDOMEN: Soft, Nontender, Nondistended; Bowel sounds present  EXTREMITIES/MSK:  No edema, calf tenderness   PSYCH: AAOx3  NEUROLOGY: non-focal          LABS:                        8.4    7.23  )-----------( 380      ( 10 Oct 2024 07:45 )             26.9     10-10    131[L]  |  98  |  10  ----------------------------<  104[H]  4.0   |  22  |  0.55    Ca    7.9[L]      10 Oct 2024 07:46  Phos  1.4     10-09  Mg     2.4     10-09            Urinalysis Basic - ( 10 Oct 2024 07:46 )    Color: x / Appearance: x / SG: x / pH: x  Gluc: 104 mg/dL / Ketone: x  / Bili: x / Urobili: x   Blood: x / Protein: x / Nitrite: x   Leuk Esterase: x / RBC: x / WBC x   Sq Epi: x / Non Sq Epi: x / Bacteria: x          RADIOLOGY & ADDITIONAL TESTS:    Imaging Personally Reviewed:    Consultant(s) Notes Reviewed:      Care Discussed with Consultants/Other Providers:   Nick Ch   contact via pager or TEAMS        CC: Patient is a 74y old  Female who presents with a chief complaint of fall (04 Oct 2024 17:59)      SUBJECTIVE / OVERNIGHT EVENTS: only c/o is surgical site pain. denies other complaints on ros    MEDICATIONS  (STANDING):  acetaminophen     Tablet .. 1000 milliGRAM(s) Oral every 8 hours  acyclovir Topical 5% Ointment 1 Application(s) Topical five times a day  atorvastatin 40 milliGRAM(s) Oral at bedtime  calamine/zinc oxide Lotion 1 Application(s) Topical daily  chlorthalidone 25 milliGRAM(s) Oral daily  DULoxetine 20 milliGRAM(s) Oral daily  enoxaparin Injectable 40 milliGRAM(s) SubCutaneous every 24 hours  ergocalciferol 28304 Unit(s) Oral every week  erythromycin    ethylsuccinate Suspension 40 mG/mL 128 milliGRAM(s) Oral every 12 hours  losartan 100 milliGRAM(s) Oral daily  methocarbamol 500 milliGRAM(s) Oral every 8 hours  metoprolol tartrate 25 milliGRAM(s) Oral two times a day  midodrine 5 milliGRAM(s) Oral <User Schedule>  montelukast 10 milliGRAM(s) Oral daily  multivitamin 1 Tablet(s) Oral daily  polyethylene glycol 3350 17 Gram(s) Oral two times a day  pramipexole 3.75 milliGRAM(s) Oral at bedtime  pramipexole 1 milliGRAM(s) Oral <User Schedule>  pregabalin 50 milliGRAM(s) Oral three times a day  tamsulosin 0.4 milliGRAM(s) Oral at bedtime  voquenza  10 mg 10 milliGRAM(s) 1 Tablet(s) Oral daily    MEDICATIONS  (PRN):  diphenhydrAMINE 25 milliGRAM(s) Oral every 8 hours PRN Rash and/or Itching  oxyCODONE    IR 2.5 milliGRAM(s) Oral every 4 hours PRN Moderate Pain (4 - 6)  oxyCODONE    IR 5 milliGRAM(s) Oral four times a day PRN Severe Pain (7 - 10)  sodium chloride 0.65% Nasal 1 Spray(s) Both Nostrils two times a day PRN Nasal Congestion      Vital Signs Last 24 Hrs  T(C): 36.8 (10 Oct 2024 08:20), Max: 36.9 (09 Oct 2024 21:24)  T(F): 98.2 (10 Oct 2024 08:20), Max: 98.4 (09 Oct 2024 21:24)  HR: 73 (10 Oct 2024 08:20) (73 - 83)  BP: 143/76 (10 Oct 2024 08:20) (122/67 - 158/73)  BP(mean): --  RR: 18 (10 Oct 2024 08:20) (16 - 18)  SpO2: 96% (10 Oct 2024 08:20) (94% - 96%)  CAPILLARY BLOOD GLUCOSE        I&O's Summary    09 Oct 2024 07:01  -  10 Oct 2024 07:00  --------------------------------------------------------  IN: 0 mL / OUT: 4300 mL / NET: -4300 mL      tele: nsr    PHYSICAL EXAM:    GENERAL: NAD   HEENT: EOMI, PERRL  PULM: Clear to auscultation bilaterally  CV: Regular rate and rhythm; nl S1, S2; No murmurs, rubs, or gallops  ABDOMEN: Soft, Nontender, Nondistended; Bowel sounds present  EXTREMITIES/MSK:  No edema, calf tenderness   PSYCH: AAOx3  NEUROLOGY: non-focal          LABS:                        8.4    7.23  )-----------( 380      ( 10 Oct 2024 07:45 )             26.9     10-10    131[L]  |  98  |  10  ----------------------------<  104[H]  4.0   |  22  |  0.55    Ca    7.9[L]      10 Oct 2024 07:46  Phos  1.4     10-09  Mg     2.4     10-09            Urinalysis Basic - ( 10 Oct 2024 07:46 )    Color: x / Appearance: x / SG: x / pH: x  Gluc: 104 mg/dL / Ketone: x  / Bili: x / Urobili: x   Blood: x / Protein: x / Nitrite: x   Leuk Esterase: x / RBC: x / WBC x   Sq Epi: x / Non Sq Epi: x / Bacteria: x          RADIOLOGY & ADDITIONAL TESTS:    Imaging Personally Reviewed:    Consultant(s) Notes Reviewed:      Care Discussed with Consultants/Other Providers: neurosurg

## 2024-10-11 DIAGNOSIS — R74.8 ABNORMAL LEVELS OF OTHER SERUM ENZYMES: ICD-10-CM

## 2024-10-11 DIAGNOSIS — E83.39 OTHER DISORDERS OF PHOSPHORUS METABOLISM: ICD-10-CM

## 2024-10-11 LAB
ALBUMIN SERPL ELPH-MCNC: 3.4 G/DL — SIGNIFICANT CHANGE UP (ref 3.3–5)
ALP SERPL-CCNC: 206 U/L — HIGH (ref 40–120)
ALT FLD-CCNC: 36 U/L — SIGNIFICANT CHANGE UP (ref 10–45)
ANION GAP SERPL CALC-SCNC: 12 MMOL/L — SIGNIFICANT CHANGE UP (ref 5–17)
AST SERPL-CCNC: 31 U/L — SIGNIFICANT CHANGE UP (ref 10–40)
BILIRUB SERPL-MCNC: 0.4 MG/DL — SIGNIFICANT CHANGE UP (ref 0.2–1.2)
BUN SERPL-MCNC: 9 MG/DL — SIGNIFICANT CHANGE UP (ref 7–23)
CALCIUM SERPL-MCNC: 8.5 MG/DL — SIGNIFICANT CHANGE UP (ref 8.4–10.5)
CHLORIDE SERPL-SCNC: 101 MMOL/L — SIGNIFICANT CHANGE UP (ref 96–108)
CO2 SERPL-SCNC: 21 MMOL/L — LOW (ref 22–31)
CREAT SERPL-MCNC: 0.66 MG/DL — SIGNIFICANT CHANGE UP (ref 0.5–1.3)
EGFR: 91 ML/MIN/1.73M2 — SIGNIFICANT CHANGE UP
GLUCOSE SERPL-MCNC: 93 MG/DL — SIGNIFICANT CHANGE UP (ref 70–99)
HCT VFR BLD CALC: 27.6 % — LOW (ref 34.5–45)
HGB BLD-MCNC: 8.8 G/DL — LOW (ref 11.5–15.5)
MAGNESIUM SERPL-MCNC: 2.3 MG/DL — SIGNIFICANT CHANGE UP (ref 1.6–2.6)
MCHC RBC-ENTMCNC: 28.2 PG — SIGNIFICANT CHANGE UP (ref 27–34)
MCHC RBC-ENTMCNC: 31.9 GM/DL — LOW (ref 32–36)
MCV RBC AUTO: 88.5 FL — SIGNIFICANT CHANGE UP (ref 80–100)
NRBC # BLD: 0 /100 WBCS — SIGNIFICANT CHANGE UP (ref 0–0)
PHOSPHATE SERPL-MCNC: 1.7 MG/DL — LOW (ref 2.5–4.5)
PLATELET # BLD AUTO: 478 K/UL — HIGH (ref 150–400)
POTASSIUM SERPL-MCNC: 4.7 MMOL/L — SIGNIFICANT CHANGE UP (ref 3.5–5.3)
POTASSIUM SERPL-SCNC: 4.7 MMOL/L — SIGNIFICANT CHANGE UP (ref 3.5–5.3)
PROT SERPL-MCNC: 6.2 G/DL — SIGNIFICANT CHANGE UP (ref 6–8.3)
RBC # BLD: 3.12 M/UL — LOW (ref 3.8–5.2)
RBC # FLD: 15.3 % — HIGH (ref 10.3–14.5)
SODIUM SERPL-SCNC: 134 MMOL/L — LOW (ref 135–145)
WBC # BLD: 7.91 K/UL — SIGNIFICANT CHANGE UP (ref 3.8–10.5)
WBC # FLD AUTO: 7.91 K/UL — SIGNIFICANT CHANGE UP (ref 3.8–10.5)

## 2024-10-11 PROCEDURE — 99232 SBSQ HOSP IP/OBS MODERATE 35: CPT

## 2024-10-11 PROCEDURE — 99233 SBSQ HOSP IP/OBS HIGH 50: CPT

## 2024-10-11 RX ORDER — SOD PHOS DI, MONO/K PHOS MONO 250 MG
1 TABLET ORAL THREE TIMES A DAY
Refills: 0 | Status: DISCONTINUED | OUTPATIENT
Start: 2024-10-11 | End: 2024-10-11

## 2024-10-11 RX ORDER — HYDROMORPHONE HYDROCHLORIDE 1 MG/ML
1 INJECTION, SOLUTION INTRAMUSCULAR; INTRAVENOUS; SUBCUTANEOUS EVERY 4 HOURS
Refills: 0 | Status: DISCONTINUED | OUTPATIENT
Start: 2024-10-11 | End: 2024-10-11

## 2024-10-11 RX ORDER — HYDROMORPHONE HYDROCHLORIDE 1 MG/ML
2 INJECTION, SOLUTION INTRAMUSCULAR; INTRAVENOUS; SUBCUTANEOUS EVERY 4 HOURS
Refills: 0 | Status: DISCONTINUED | OUTPATIENT
Start: 2024-10-11 | End: 2024-10-15

## 2024-10-11 RX ORDER — MIDODRINE HYDROCHLORIDE 5 MG/1
2.5 TABLET ORAL
Refills: 0 | Status: DISCONTINUED | OUTPATIENT
Start: 2024-10-11 | End: 2024-10-12

## 2024-10-11 RX ORDER — SODIUM PHOSPHATE IN D5W 15MMOL/250
30 PLASTIC BAG, INJECTION (ML) INTRAVENOUS ONCE
Refills: 0 | Status: COMPLETED | OUTPATIENT
Start: 2024-10-11 | End: 2024-10-11

## 2024-10-11 RX ORDER — DIPHENHYDRAMINE HCL 12.5MG/5ML
25 LIQUID (ML) ORAL EVERY 8 HOURS
Refills: 0 | Status: DISCONTINUED | OUTPATIENT
Start: 2024-10-11 | End: 2024-10-17

## 2024-10-11 RX ORDER — PREGABALIN 25 MG/1
75 CAPSULE ORAL THREE TIMES A DAY
Refills: 0 | Status: DISCONTINUED | OUTPATIENT
Start: 2024-10-11 | End: 2024-10-14

## 2024-10-11 RX ORDER — HYDROMORPHONE HYDROCHLORIDE 1 MG/ML
1 INJECTION, SOLUTION INTRAMUSCULAR; INTRAVENOUS; SUBCUTANEOUS EVERY 4 HOURS
Refills: 0 | Status: DISCONTINUED | OUTPATIENT
Start: 2024-10-11 | End: 2024-10-15

## 2024-10-11 RX ADMIN — Medication 1000 MILLIGRAM(S): at 22:44

## 2024-10-11 RX ADMIN — HYDROMORPHONE HYDROCHLORIDE 1 MILLIGRAM(S): 1 INJECTION, SOLUTION INTRAMUSCULAR; INTRAVENOUS; SUBCUTANEOUS at 12:20

## 2024-10-11 RX ADMIN — Medication 40 MILLIEQUIVALENT(S): at 01:12

## 2024-10-11 RX ADMIN — HYDROMORPHONE HYDROCHLORIDE 2 MILLIGRAM(S): 1 INJECTION, SOLUTION INTRAMUSCULAR; INTRAVENOUS; SUBCUTANEOUS at 17:27

## 2024-10-11 RX ADMIN — Medication 17 GRAM(S): at 17:27

## 2024-10-11 RX ADMIN — Medication 1 APPLICATION(S): at 11:43

## 2024-10-11 RX ADMIN — PREGABALIN 75 MILLIGRAM(S): 25 CAPSULE ORAL at 21:46

## 2024-10-11 RX ADMIN — PRAMIPEXOLE DIHYDROCHLORIDE 3.75 MILLIGRAM(S): 0.5 TABLET ORAL at 21:42

## 2024-10-11 RX ADMIN — PRAMIPEXOLE DIHYDROCHLORIDE 1 MILLIGRAM(S): 0.5 TABLET ORAL at 05:06

## 2024-10-11 RX ADMIN — Medication 128 MILLIGRAM(S): at 05:05

## 2024-10-11 RX ADMIN — Medication 500 MILLIGRAM(S): at 14:41

## 2024-10-11 RX ADMIN — Medication 128 MILLIGRAM(S): at 18:48

## 2024-10-11 RX ADMIN — HYDROMORPHONE HYDROCHLORIDE 1 MILLIGRAM(S): 1 INJECTION, SOLUTION INTRAMUSCULAR; INTRAVENOUS; SUBCUTANEOUS at 12:50

## 2024-10-11 RX ADMIN — LOSARTAN POTASSIUM 100 MILLIGRAM(S): 100 TABLET, FILM COATED ORAL at 05:05

## 2024-10-11 RX ADMIN — Medication 25 MILLIGRAM(S): at 05:05

## 2024-10-11 RX ADMIN — OXYCODONE HYDROCHLORIDE 5 MILLIGRAM(S): 30 TABLET, FILM COATED, EXTENDED RELEASE ORAL at 01:12

## 2024-10-11 RX ADMIN — Medication 1000 MILLIGRAM(S): at 06:05

## 2024-10-11 RX ADMIN — Medication 1000 MILLIGRAM(S): at 21:44

## 2024-10-11 RX ADMIN — HYDROMORPHONE HYDROCHLORIDE 2 MILLIGRAM(S): 1 INJECTION, SOLUTION INTRAMUSCULAR; INTRAVENOUS; SUBCUTANEOUS at 22:45

## 2024-10-11 RX ADMIN — MULTI VITAMIN/MINERAL SUPPLEMENT WITH ASCORBIC ACID, NIACIN, PYRIDOXINE, PANTOTHENIC ACID, FOLIC ACID, RIBOFLAVIN, THIAMIN, BIOTIN, COBALAMIN AND ZINC. 1 TABLET(S): 60; 20; 12.5; 10; 10; 1.7; 1.5; 1; .3; .006 TABLET, COATED ORAL at 11:42

## 2024-10-11 RX ADMIN — OXYCODONE HYDROCHLORIDE 5 MILLIGRAM(S): 30 TABLET, FILM COATED, EXTENDED RELEASE ORAL at 02:10

## 2024-10-11 RX ADMIN — Medication 1000 MILLIGRAM(S): at 05:05

## 2024-10-11 RX ADMIN — Medication 25 MILLIGRAM(S): at 05:06

## 2024-10-11 RX ADMIN — PREGABALIN 75 MILLIGRAM(S): 25 CAPSULE ORAL at 14:41

## 2024-10-11 RX ADMIN — OXYCODONE HYDROCHLORIDE 5 MILLIGRAM(S): 30 TABLET, FILM COATED, EXTENDED RELEASE ORAL at 07:29

## 2024-10-11 RX ADMIN — Medication 0.4 MILLIGRAM(S): at 21:45

## 2024-10-11 RX ADMIN — Medication 25 MILLIGRAM(S): at 21:47

## 2024-10-11 RX ADMIN — Medication 85 MILLIMOLE(S): at 12:23

## 2024-10-11 RX ADMIN — OXYCODONE HYDROCHLORIDE 5 MILLIGRAM(S): 30 TABLET, FILM COATED, EXTENDED RELEASE ORAL at 07:59

## 2024-10-11 RX ADMIN — ENOXAPARIN SODIUM 40 MILLIGRAM(S): 150 INJECTION SUBCUTANEOUS at 17:31

## 2024-10-11 RX ADMIN — OXYCODONE HYDROCHLORIDE 2.5 MILLIGRAM(S): 30 TABLET, FILM COATED, EXTENDED RELEASE ORAL at 05:50

## 2024-10-11 RX ADMIN — OXYCODONE HYDROCHLORIDE 2.5 MILLIGRAM(S): 30 TABLET, FILM COATED, EXTENDED RELEASE ORAL at 04:50

## 2024-10-11 RX ADMIN — Medication 20 MILLIGRAM(S): at 11:43

## 2024-10-11 RX ADMIN — Medication 25 MILLIGRAM(S): at 17:32

## 2024-10-11 RX ADMIN — Medication 1000 MILLIGRAM(S): at 15:11

## 2024-10-11 RX ADMIN — ATORVASTATIN CALCIUM 40 MILLIGRAM(S): 10 TABLET, FILM COATED ORAL at 21:45

## 2024-10-11 RX ADMIN — PREGABALIN 50 MILLIGRAM(S): 25 CAPSULE ORAL at 05:05

## 2024-10-11 RX ADMIN — Medication 1000 MILLIGRAM(S): at 14:41

## 2024-10-11 RX ADMIN — MONTELUKAST SODIUM 10 MILLIGRAM(S): 10 TABLET, FILM COATED ORAL at 11:43

## 2024-10-11 RX ADMIN — HYDROMORPHONE HYDROCHLORIDE 2 MILLIGRAM(S): 1 INJECTION, SOLUTION INTRAMUSCULAR; INTRAVENOUS; SUBCUTANEOUS at 21:45

## 2024-10-11 RX ADMIN — Medication 500 MILLIGRAM(S): at 05:05

## 2024-10-11 NOTE — PROGRESS NOTE ADULT - SUBJECTIVE AND OBJECTIVE BOX
SUBJECTIVE:     OVERNIGHT EVENTS:     Vital Signs Last 24 Hrs  T(C): 36.7 (11 Oct 2024 08:40), Max: 37.1 (10 Oct 2024 20:51)  T(F): 98.1 (11 Oct 2024 08:40), Max: 98.8 (10 Oct 2024 20:51)  HR: 93 (11 Oct 2024 08:40) (68 - 97)  BP: 139/76 (11 Oct 2024 08:40) (114/66 - 166/76)  BP(mean): --  RR: 18 (11 Oct 2024 08:40) (18 - 18)  SpO2: 96% (11 Oct 2024 08:40) (93% - 96%)    Parameters below as of 11 Oct 2024 04:30  Patient On (Oxygen Delivery Method): room air      PHYSICAL EXAM:    Constitutional: No Acute Distress     Neurological: AOx3, Following Commands, Moving all Extremities     Motor exam:          Upper extremity                         Delt     Bicep     Tricep    HG                                                 R         5/5        5/5        5/5       5/5                                               L          5/5        5/5        5/5       5/5          Lower extremity                        HF         KF        KE       DF         PF                                                  R        5/5        5/5        5/5       5/5         5/5                                               L         5/5        5/5       5/5       5/5          5/5                                                 Sensation: [] intact to light touch  [] decreased:     Pulmonary: Clear to Auscultation, No rales, No rhonchi, No wheezes     Cardiovascular: S1, S2, Regular rate and rhythm     Gastrointestinal: Soft, Non-tender, Non-distended     Extremities: No calf tenderness     Incision:     LABS:                        8.8    7.91  )-----------( 478      ( 11 Oct 2024 07:28 )             27.6    10-11    134[L]  |  101  |  9   ----------------------------<  93  4.7   |  21[L]  |  0.66    Ca    8.5      11 Oct 2024 07:28  Phos  1.7     10-11  Mg     2.3     10-11    TPro  6.2  /  Alb  3.4  /  TBili  0.4  /  DBili  x   /  AST  31  /  ALT  36  /  AlkPhos  206[H]  10-11      10-10 @ 07:01  -  10-11 @ 07:00  --------------------------------------------------------  IN: 0 mL / OUT: 3750 mL / NET: -3750 mL      DRAINS:     MEDICATIONS:  Anticoagulation:   enoxaparin Injectable 40 milliGRAM(s) SubCutaneous every 24 hours    Antibiotics:  erythromycin    ethylsuccinate Suspension 40 mG/mL 128 milliGRAM(s) Oral every 12 hours    Endo:  atorvastatin 40 milliGRAM(s) Oral at bedtime    Neuro:  acetaminophen     Tablet .. 1000 milliGRAM(s) Oral every 8 hours  DULoxetine 20 milliGRAM(s) Oral daily  HYDROmorphone   Solution 1 milliGRAM(s) Oral every 4 hours PRN Moderate Pain (4 - 6)  HYDROmorphone   Tablet 2 milliGRAM(s) Oral every 4 hours PRN Severe Pain (7 - 10)  methocarbamol 500 milliGRAM(s) Oral every 8 hours  pramipexole 3.75 milliGRAM(s) Oral at bedtime  pramipexole 1 milliGRAM(s) Oral <User Schedule>  pregabalin 75 milliGRAM(s) Oral three times a day    Cardiac:  chlorthalidone 25 milliGRAM(s) Oral daily  losartan 100 milliGRAM(s) Oral daily  metoprolol tartrate 25 milliGRAM(s) Oral two times a day  midodrine 2.5 milliGRAM(s) Oral <User Schedule>    Pulm:  montelukast 10 milliGRAM(s) Oral daily    GI/:  polyethylene glycol 3350 17 Gram(s) Oral two times a day  tamsulosin 0.4 milliGRAM(s) Oral at bedtime    Other:   acyclovir Topical 5% Ointment 1 Application(s) Topical five times a day  calamine/zinc oxide Lotion 1 Application(s) Topical daily  ergocalciferol 28640 Unit(s) Oral every week  multivitamin 1 Tablet(s) Oral daily  sodium chloride 0.65% Nasal 1 Spray(s) Both Nostrils two times a day PRN Nasal Congestion  sodium phosphate 30 milliMole(s)/500 mL IVPB 30 milliMole(s) IV Intermittent once  voquenza  10 mg 10 milliGRAM(s) 1 Tablet(s) Oral daily    DIET:     IMAGING:   Recent imaging studies were reviewed.   SUBJECTIVE: Patient seen and examined at bedside. C/o incisional pain, better relieved with IV Dilaudid. Amenable to switching to PO Dilaudid from PO oxycodone. Awaiting chronic pain management consult. Denies having any headaches, n/v, shortness of breath    OVERNIGHT EVENTS:  none    Vital Signs Last 24 Hrs  T(C): 36.7 (11 Oct 2024 08:40), Max: 37.1 (10 Oct 2024 20:51)  T(F): 98.1 (11 Oct 2024 08:40), Max: 98.8 (10 Oct 2024 20:51)  HR: 93 (11 Oct 2024 08:40) (68 - 97)  BP: 139/76 (11 Oct 2024 08:40) (114/66 - 166/76)  BP(mean): --  RR: 18 (11 Oct 2024 08:40) (18 - 18)  SpO2: 96% (11 Oct 2024 08:40) (93% - 96%)    Parameters below as of 11 Oct 2024 04:30  Patient On (Oxygen Delivery Method): room air      PHYSICAL EXAM:    Constitutional: No Acute Distress     Neurological: AOx3, Following Commands, Moving all Extremities     Motor exam:          Upper extremity                         Delt     Bicep     Tricep    HG                                                 R         5/5        5/5        5/5       5/5                                               L          5/5        5/5        5/5       5/5          Lower extremity                        HF         KF        KE       DF         PF                                                  R        5/5        5/5        5/5       5/5         5/5                                               L         5/5        5/5       5/5       5/5          5/5                                                 Sensation: [x] intact to light touch  [] decreased:     Pulmonary: Clear to Auscultation, No rales, No rhonchi, No wheezes     Cardiovascular: S1, S2, Regular rate and rhythm     Gastrointestinal: Soft, Non-tender, Non-distended     Extremities: No calf tenderness     Incision: c/d/i, upper superficial collection, soft     LABS:                        8.8    7.91  )-----------( 478      ( 11 Oct 2024 07:28 )             27.6    10-11    134[L]  |  101  |  9   ----------------------------<  93  4.7   |  21[L]  |  0.66    Ca    8.5      11 Oct 2024 07:28  Phos  1.7     10-11  Mg     2.3     10-11    TPro  6.2  /  Alb  3.4  /  TBili  0.4  /  DBili  x   /  AST  31  /  ALT  36  /  AlkPhos  206[H]  10-11      10-10 @ 07:01  -  10-11 @ 07:00  --------------------------------------------------------  IN: 0 mL / OUT: 3750 mL / NET: -3750 mL      DRAINS: none    MEDICATIONS:  Anticoagulation:   enoxaparin Injectable 40 milliGRAM(s) SubCutaneous every 24 hours    Antibiotics:  erythromycin    ethylsuccinate Suspension 40 mG/mL 128 milliGRAM(s) Oral every 12 hours    Endo:  atorvastatin 40 milliGRAM(s) Oral at bedtime    Neuro:  acetaminophen     Tablet .. 1000 milliGRAM(s) Oral every 8 hours  DULoxetine 20 milliGRAM(s) Oral daily  HYDROmorphone   Solution 1 milliGRAM(s) Oral every 4 hours PRN Moderate Pain (4 - 6)  HYDROmorphone   Tablet 2 milliGRAM(s) Oral every 4 hours PRN Severe Pain (7 - 10)  methocarbamol 500 milliGRAM(s) Oral every 8 hours  pramipexole 3.75 milliGRAM(s) Oral at bedtime  pramipexole 1 milliGRAM(s) Oral <User Schedule>  pregabalin 75 milliGRAM(s) Oral three times a day    Cardiac:  chlorthalidone 25 milliGRAM(s) Oral daily  losartan 100 milliGRAM(s) Oral daily  metoprolol tartrate 25 milliGRAM(s) Oral two times a day  midodrine 2.5 milliGRAM(s) Oral <User Schedule>    Pulm:  montelukast 10 milliGRAM(s) Oral daily    GI/:  polyethylene glycol 3350 17 Gram(s) Oral two times a day  tamsulosin 0.4 milliGRAM(s) Oral at bedtime    Other:   acyclovir Topical 5% Ointment 1 Application(s) Topical five times a day  calamine/zinc oxide Lotion 1 Application(s) Topical daily  ergocalciferol 19189 Unit(s) Oral every week  multivitamin 1 Tablet(s) Oral daily  sodium chloride 0.65% Nasal 1 Spray(s) Both Nostrils two times a day PRN Nasal Congestion  sodium phosphate 30 milliMole(s)/500 mL IVPB 30 milliMole(s) IV Intermittent once  voquenza  10 mg 10 milliGRAM(s) 1 Tablet(s) Oral daily    DIET:  Regular    IMAGING:   Recent imaging studies were reviewed.    < from: CT Abdomen and Pelvis w/ IV Cont (10.10.24 @ 04:32) >  IMPRESSION:  Unchanged postoperative appearance of the spine with T12 fracture again   noted.    No evidence of GI bleed.    --- End of Report ---    < end of copied text >      < from: CT Chest w/ IV Cont (10.10.24 @ 04:33) >  FINDINGS:  CHEST:  LUNGS AND LARGE AIRWAYS: Patent central airways. Bilateral pleural   effusions with adjacent atelectasis.  VESSELS: Aortic calcifications. Coronary artery calcifications.  HEART: Heart size is normal. No pericardial effusion.  MEDIASTINUM AND PILAR: No lymphadenopathy. Small hiatal hernia.  CHEST WALL AND LOWER NECK: Within normal limits.    ABDOMEN AND PELVIS:  Evaluation is limited due to streak artifact.    LIVER: Thereare is a large cyst, stable since prior exam. There are   small hypodense foci on segment II and segment I too small to   characterize, unchanged since prior.  BILE DUCTS: Normal caliber.  GALLBLADDER: Within normal limits.  SPLEEN: Within normal limits.  PANCREAS: Within normal limits.  ADRENALS: Within normal limits.  KIDNEYS/URETERS: Within normal limits.    BLADDER: Limited evaluation.  REPRODUCTIVE ORGANS: Limited evaluation.    BOWEL: No bowel obstruction. Appendix is normal. No bleeding identified.  PERITONEUM/RETROPERITONEUM: Within normal limits.  VESSELS: Atherosclerotic changes.  LYMPH NODES: No lymphadenopathy.  ABDOMINAL WALL: Left abdominal wall laxity.  BONES: Right shoulder arthroplasty. Bilateral hip arthroplasties. Status   post spinal fusion of T9-L3 with bilateral rods and screws and   cylindrical cage within the superior T12 vertebral body fracture   fragments. Mild ill-defined fluid is noted overlying the surgical bed.   Resection of the left T12 pedicle and left hemilaminectomy. Degenerative   changes. Left lateral 7th through 10th rib fractures. Chronic appearing   right rib fractures. Heterogeneity of the sacrum and SI joints again   noted.    IMPRESSION:  Unchanged postoperative appearance of the spine with T12 fracture again   noted.    < end of copied text >    < from: CT Lumbar Spine No Cont (10.05.24 @ 12:24) >    IMPRESSION:    Status post spinal fusion of T9-L3 with bilateral rods and screws and   cylindrical cage within the fractured T12 vertebral body. Resection of   the LEFT T12 pedicle and a LEFT hemilaminectomy. Hardware appears intact.   No evidence of hardware loosening. Expected postoperative changes. No   evidence of paraspinal hematoma.    --- End of Report ---    < end of copied text >    < from: [facility] Duplex Lower Ext Vein Scan, Bilat (09.30.24 @ 16:57) >  IMPRESSION:  No evidence of deep venous thrombosis in either lower extremity.    < end of copied text >

## 2024-10-11 NOTE — PROGRESS NOTE ADULT - SUBJECTIVE AND OBJECTIVE BOX
Nick Ch   contact via pager or TEAMS        CC: Patient is a 74y old  Female who presents with a chief complaint of fall (04 Oct 2024 17:59)      SUBJECTIVE / OVERNIGHT EVENTS:    MEDICATIONS  (STANDING):  acetaminophen     Tablet .. 1000 milliGRAM(s) Oral every 8 hours  acyclovir Topical 5% Ointment 1 Application(s) Topical five times a day  atorvastatin 40 milliGRAM(s) Oral at bedtime  calamine/zinc oxide Lotion 1 Application(s) Topical daily  chlorthalidone 25 milliGRAM(s) Oral daily  DULoxetine 20 milliGRAM(s) Oral daily  enoxaparin Injectable 40 milliGRAM(s) SubCutaneous every 24 hours  ergocalciferol 98653 Unit(s) Oral every week  erythromycin    ethylsuccinate Suspension 40 mG/mL 128 milliGRAM(s) Oral every 12 hours  losartan 100 milliGRAM(s) Oral daily  methocarbamol 500 milliGRAM(s) Oral every 8 hours  metoprolol tartrate 25 milliGRAM(s) Oral two times a day  midodrine 2.5 milliGRAM(s) Oral <User Schedule>  montelukast 10 milliGRAM(s) Oral daily  multivitamin 1 Tablet(s) Oral daily  polyethylene glycol 3350 17 Gram(s) Oral two times a day  pramipexole 3.75 milliGRAM(s) Oral at bedtime  pramipexole 1 milliGRAM(s) Oral <User Schedule>  pregabalin 75 milliGRAM(s) Oral three times a day  sodium phosphate 30 milliMole(s)/500 mL IVPB 30 milliMole(s) IV Intermittent once  tamsulosin 0.4 milliGRAM(s) Oral at bedtime  voquenza  10 mg 10 milliGRAM(s) 1 Tablet(s) Oral daily    MEDICATIONS  (PRN):  HYDROmorphone   Solution 1 milliGRAM(s) Oral every 4 hours PRN Moderate Pain (4 - 6)  HYDROmorphone   Tablet 2 milliGRAM(s) Oral every 4 hours PRN Severe Pain (7 - 10)  sodium chloride 0.65% Nasal 1 Spray(s) Both Nostrils two times a day PRN Nasal Congestion      Vital Signs Last 24 Hrs  T(C): 36.7 (11 Oct 2024 08:40), Max: 37.1 (10 Oct 2024 20:51)  T(F): 98.1 (11 Oct 2024 08:40), Max: 98.8 (10 Oct 2024 20:51)  HR: 93 (11 Oct 2024 08:40) (68 - 97)  BP: 139/76 (11 Oct 2024 08:40) (114/66 - 166/76)  BP(mean): --  RR: 18 (11 Oct 2024 08:40) (18 - 18)  SpO2: 96% (11 Oct 2024 08:40) (93% - 96%)  CAPILLARY BLOOD GLUCOSE        I&O's Summary    10 Oct 2024 07:01  -  11 Oct 2024 07:00  --------------------------------------------------------  IN: 0 mL / OUT: 3750 mL / NET: -3750 mL      tele:    PHYSICAL EXAM:    GENERAL: NAD   HEENT: EOMI, PERRL  PULM: Clear to auscultation bilaterally  CV: Regular rate and rhythm; nl S1, S2; No murmurs, rubs, or gallops  ABDOMEN: Soft, Nontender, Nondistended; Bowel sounds present  EXTREMITIES/MSK:  No edema, calf tenderness   PSYCH: AAOx3  NEUROLOGY: non-focal          LABS:                        8.8    7.91  )-----------( 478      ( 11 Oct 2024 07:28 )             27.6     10-11    134[L]  |  101  |  9   ----------------------------<  93  4.7   |  21[L]  |  0.66    Ca    8.5      11 Oct 2024 07:28  Phos  1.7     10-11  Mg     2.3     10-11    TPro  6.2  /  Alb  3.4  /  TBili  0.4  /  DBili  x   /  AST  31  /  ALT  36  /  AlkPhos  206[H]  10-11          Urinalysis Basic - ( 11 Oct 2024 07:28 )    Color: x / Appearance: x / SG: x / pH: x  Gluc: 93 mg/dL / Ketone: x  / Bili: x / Urobili: x   Blood: x / Protein: x / Nitrite: x   Leuk Esterase: x / RBC: x / WBC x   Sq Epi: x / Non Sq Epi: x / Bacteria: x          RADIOLOGY & ADDITIONAL TESTS:    Imaging Personally Reviewed:    Consultant(s) Notes Reviewed:      Care Discussed with Consultants/Other Providers:   Nick Ch   contact via pager or TEAMS        CC: Patient is a 74y old  Female who presents with a chief complaint of fall (04 Oct 2024 17:59)      SUBJECTIVE / OVERNIGHT EVENTS: still with surgical site pain, worse with movement. no cp/sob/orthopnea. no n/v/abd pain. no f/c/r. other ros neg    MEDICATIONS  (STANDING):  acetaminophen     Tablet .. 1000 milliGRAM(s) Oral every 8 hours  acyclovir Topical 5% Ointment 1 Application(s) Topical five times a day  atorvastatin 40 milliGRAM(s) Oral at bedtime  calamine/zinc oxide Lotion 1 Application(s) Topical daily  chlorthalidone 25 milliGRAM(s) Oral daily  DULoxetine 20 milliGRAM(s) Oral daily  enoxaparin Injectable 40 milliGRAM(s) SubCutaneous every 24 hours  ergocalciferol 05136 Unit(s) Oral every week  erythromycin    ethylsuccinate Suspension 40 mG/mL 128 milliGRAM(s) Oral every 12 hours  losartan 100 milliGRAM(s) Oral daily  methocarbamol 500 milliGRAM(s) Oral every 8 hours  metoprolol tartrate 25 milliGRAM(s) Oral two times a day  midodrine 2.5 milliGRAM(s) Oral <User Schedule>  montelukast 10 milliGRAM(s) Oral daily  multivitamin 1 Tablet(s) Oral daily  polyethylene glycol 3350 17 Gram(s) Oral two times a day  pramipexole 3.75 milliGRAM(s) Oral at bedtime  pramipexole 1 milliGRAM(s) Oral <User Schedule>  pregabalin 75 milliGRAM(s) Oral three times a day  sodium phosphate 30 milliMole(s)/500 mL IVPB 30 milliMole(s) IV Intermittent once  tamsulosin 0.4 milliGRAM(s) Oral at bedtime  voquenza  10 mg 10 milliGRAM(s) 1 Tablet(s) Oral daily    MEDICATIONS  (PRN):  HYDROmorphone   Solution 1 milliGRAM(s) Oral every 4 hours PRN Moderate Pain (4 - 6)  HYDROmorphone   Tablet 2 milliGRAM(s) Oral every 4 hours PRN Severe Pain (7 - 10)  sodium chloride 0.65% Nasal 1 Spray(s) Both Nostrils two times a day PRN Nasal Congestion      Vital Signs Last 24 Hrs  T(C): 36.7 (11 Oct 2024 08:40), Max: 37.1 (10 Oct 2024 20:51)  T(F): 98.1 (11 Oct 2024 08:40), Max: 98.8 (10 Oct 2024 20:51)  HR: 93 (11 Oct 2024 08:40) (68 - 97)  BP: 139/76 (11 Oct 2024 08:40) (114/66 - 166/76)  BP(mean): --  RR: 18 (11 Oct 2024 08:40) (18 - 18)  SpO2: 96% (11 Oct 2024 08:40) (93% - 96%)  CAPILLARY BLOOD GLUCOSE        I&O's Summary    10 Oct 2024 07:01  -  11 Oct 2024 07:00  --------------------------------------------------------  IN: 0 mL / OUT: 3750 mL / NET: -3750 mL      tele: nsr    PHYSICAL EXAM:    GENERAL: NAD   HEENT: EOMI, PERRL  PULM: Clear to auscultation bilaterally  CV: Regular rate and rhythm; nl S1, S2; No murmurs, rubs, or gallops  ABDOMEN: Soft, Nontender, Nondistended; Bowel sounds present  EXTREMITIES/MSK:  No edema, calf tenderness   PSYCH: AAOx3  NEUROLOGY: non-focal          LABS:                        8.8    7.91  )-----------( 478      ( 11 Oct 2024 07:28 )             27.6     10-11    134[L]  |  101  |  9   ----------------------------<  93  4.7   |  21[L]  |  0.66    Ca    8.5      11 Oct 2024 07:28  Phos  1.7     10-11  Mg     2.3     10-11    TPro  6.2  /  Alb  3.4  /  TBili  0.4  /  DBili  x   /  AST  31  /  ALT  36  /  AlkPhos  206[H]  10-11          Urinalysis Basic - ( 11 Oct 2024 07:28 )    Color: x / Appearance: x / SG: x / pH: x  Gluc: 93 mg/dL / Ketone: x  / Bili: x / Urobili: x   Blood: x / Protein: x / Nitrite: x   Leuk Esterase: x / RBC: x / WBC x   Sq Epi: x / Non Sq Epi: x / Bacteria: x          RADIOLOGY & ADDITIONAL TESTS:    Imaging Personally Reviewed:    Consultant(s) Notes Reviewed:      Care Discussed with Consultants/Other Providers: neurosurg

## 2024-10-11 NOTE — PROGRESS NOTE ADULT - SUBJECTIVE AND OBJECTIVE BOX
INTERVAL EVENTS/SUBJ:  No events     Home Medications:  atorvastatin 40 mg oral tablet: 1 tab(s) orally once a day (at bedtime) (30 Sep 2024 12:30)  celecoxib 100 mg oral capsule: 1 cap(s) orally 2 times a day (30 Sep 2024 12:30)  chlorthalidone 25 mg oral tablet: 1 tab(s) orally once a day (30 Sep 2024 12:30)  DULoxetine 20 mg oral delayed release capsule: 1 cap(s) orally (30 Sep 2024 12:30)  ergocalciferol 1.25 mg (50,000 intl units) oral capsule: 1 cap(s) orally once a week (01 Oct 2024 15:02)  Glucosamine Chondroitin oral capsule: 3 cap(s) orally once a day (30 Sep 2024 12:30)  losartan 100 mg oral tablet: 1 tab(s) orally once a day (30 Sep 2024 12:30)  metoprolol succinate 25 mg oral capsule, extended release: 1 cap(s) orally once a day (01 Oct 2024 15:03)  montelukast 10 mg oral tablet: 1 tab(s) orally once a day (01 Oct 2024 15:02)  pramipexole 1 mg oral tablet: 1 tab(s) orally once a day a.m. (09 Oct 2024 16:03)  pramipexole 1.5 mg oral tablet, extended release: 1 tab(s) orally 2 times a day (30 Sep 2024 12:27)  pramipexole 3.75 mg oral tablet, extended release: 1 tab(s) orally once a day (at bedtime) (09 Oct 2024 16:03)  Prolia 60 mg/mL subcutaneous solution: 60 milligram(s) subcutaneously every 6 months (30 Sep 2024 12:30)  senna:  (09 Oct 2024 16:03)  senna (sennosides) 8.6 mg oral tablet: 1 tab(s) orally once a day (09 Oct 2024 16:03)  Voquenza 10m tablet daily (01 Oct 2024 08:11)      MEDICATIONS  (STANDING):  acetaminophen     Tablet .. 1000 milliGRAM(s) Oral every 8 hours  acyclovir Topical 5% Ointment 1 Application(s) Topical five times a day  atorvastatin 40 milliGRAM(s) Oral at bedtime  calamine/zinc oxide Lotion 1 Application(s) Topical daily  chlorthalidone 25 milliGRAM(s) Oral daily  DULoxetine 20 milliGRAM(s) Oral daily  enoxaparin Injectable 40 milliGRAM(s) SubCutaneous every 24 hours  ergocalciferol 39007 Unit(s) Oral every week  erythromycin    ethylsuccinate Suspension 40 mG/mL 128 milliGRAM(s) Oral every 12 hours  losartan 100 milliGRAM(s) Oral daily  methocarbamol 500 milliGRAM(s) Oral every 8 hours  metoprolol tartrate 25 milliGRAM(s) Oral two times a day  midodrine 5 milliGRAM(s) Oral <User Schedule>  montelukast 10 milliGRAM(s) Oral daily  multivitamin 1 Tablet(s) Oral daily  polyethylene glycol 3350 17 Gram(s) Oral two times a day  pramipexole 3.75 milliGRAM(s) Oral at bedtime  pramipexole 1 milliGRAM(s) Oral <User Schedule>  pregabalin 50 milliGRAM(s) Oral three times a day  tamsulosin 0.4 milliGRAM(s) Oral at bedtime  voquenza  10 mg 10 milliGRAM(s) 1 Tablet(s) Oral daily    MEDICATIONS  (PRN):  diphenhydrAMINE 25 milliGRAM(s) Oral every 8 hours PRN Rash and/or Itching  oxyCODONE    IR 2.5 milliGRAM(s) Oral every 4 hours PRN Moderate Pain (4 - 6)  oxyCODONE    IR 5 milliGRAM(s) Oral four times a day PRN Severe Pain (7 - 10)  sodium chloride 0.65% Nasal 1 Spray(s) Both Nostrils two times a day PRN Nasal Congestion      Vital Signs Last 24 Hrs  T(C): 36.8 (11 Oct 2024 04:30), Max: 37.1 (10 Oct 2024 20:51)  T(F): 98.2 (11 Oct 2024 04:30), Max: 98.8 (10 Oct 2024 20:51)  HR: 97 (11 Oct 2024 04:30) (68 - 97)  BP: 166/76 (11 Oct 2024 04:30) (114/66 - 166/76)  BP(mean): --  RR: 18 (11 Oct 2024 04:30) (18 - 18)  SpO2: 96% (11 Oct 2024 04:30) (93% - 96%)    Parameters below as of 11 Oct 2024 04:30  Patient On (Oxygen Delivery Method): room air        REVIEW OF SYSTEMS:  As per HPI, otherwise unremarkable.     PHYSICAL EXAM:  Constitutional/Appearance: Normal, Well-developed  HEENT:   Normal oral mucosa, no drainage or redness, supple neck  Lymphatic: No lymphadenopathy  Cardiovascular: Normal S1 S2, No edema, II/VI [place]  Respiratory: Lungs clear to auscultation, respirations non-labored  Psychiatry: A & O x 3, appropriate affect.   Gastrointestinal:  Soft, Non-tender, no distention  Skin: No rashes, No ecchymoses, No cyanosis	  Neurologic: Non-focal, Alert and oriented x 3  Extremities: Normal range of motion  Vascular: Peripheral pulses palpable 2+ bilaterally (radial)    LABS:  CBC Full  -  ( 10 Oct 2024 07:45 )  WBC Count : 7.23 K/uL  RBC Count : 3.04 M/uL  Hemoglobin : 8.4 g/dL  Hematocrit : 26.9 %  Platelet Count - Automated : 380 K/uL  Mean Cell Volume : 88.5 fl  Mean Cell Hemoglobin : 27.6 pg  Mean Cell Hemoglobin Concentration : 31.2 gm/dL  Auto Neutrophil # : x  Auto Lymphocyte # : x  Auto Monocyte # : x  Auto Eosinophil # : x  Auto Basophil # : x  Auto Neutrophil % : x  Auto Lymphocyte % : x  Auto Monocyte % : x  Auto Eosinophil % : x  Auto Basophil % : x      10-10    132[L]  |  96  |  9   ----------------------------<  97  3.1[L]   |  22  |  0.65    Ca    8.2[L]      10 Oct 2024 16:31  Phos  1.5     10-10  Mg     2.3     10-10      IMPRESSION AND PLAN: 74F w HL, OA, scoliosis s/p prior thoracolumbar spine surgeries now s/p mechanical fall w T12 fracture s/p T9-L3 fusion. + post-op AF <24h treated w IV lopressor back to sinus  -tele  -TTE EF 61%  -metoprolol 25 bid  -slight volume up w pleural effusions, s/p lasix no need for ongoing diuresis  -on lovenox, but only needs full dose AC for AF if returns  -outpt rhythm monitor; [id number] 3 so will need AC if AFib persistent   -cardiology to sign off, please reconsult for further questions        35 minutes were spent on this encounter for extensive review of medical record details including labs and/or imaging studies and/or adjacent care team and consultant records, as well as review and reconciliation of current medications. Time was spent on obtaining a history, performing physical examination of patient, and answering patient and/or family questions regarding plan of care. Time was also spent discussing plan of care with patient’s other care team members including primary and consulting teams. Time also was spent on documentation of this encounter into the EHR.    ***    Matthew Ingram MD, MPhil, [facility]  Cardiologist, [facility]  ; [employer] [facility] [employer] at [facility]  email: [email or web address] on-service contact/call information, go to [email or web address] and use "[email or web address]" to login.  Outpatient Cardiology appointments, call  [phone] to arrange with a colleague; I do not have outpatient Cardiology clinic.

## 2024-10-11 NOTE — PROGRESS NOTE ADULT - ASSESSMENT
Assessment:  HPI:  74F on celecoxib for OA last took y’day otherwise no AC/AP h/o HTN, gastroparesis, peripheral neuropathy, multiple prior thoracolumbar spine surgeries for congenital scoliosis [place]/ [place] of hardware 1991 presents with back pain s/p mechanical fall from sitting y'day. Describes severe mid back pain and left hip pain, no numbness, weakness, saddle anesthesia, b/b symptoms. Pain worsened with movement, axial loading. CT TL spine w/ acute nondisplaced fracture of T12 veterbral body extending into right pedicle w/o clear cord impingement. (28 Sep 2024 15:45)    POD #  T9-L3 posterior instrumentation and fusion, PSO and interbody    Plan:    NEURO:    - q4h neuro/vital checks  - activity as tolerated   - monitor drain outputs  - imaging reviewed  - seizure ppx  - pain control with tylenol PRN, oxycodone PRN  acetaminophen     Tablet .. every 8 hours  DULoxetine daily  HYDROmorphone   Solution every 4 hours PRN Moderate Pain (4 - 6)  HYDROmorphone   Tablet every 4 hours PRN Severe Pain (7 - 10)  methocarbamol every 8 hours  pramipexole at bedtime  pramipexole <User Schedule>  pregabalin three times a day      CARDS:    - HTN, keep -160, continue  - HLD, continue   - TTE   chlorthalidone daily  losartan daily  metoprolol tartrate two times a day  midodrine <User Schedule>    PULM:   - keep O2 sat > 92%, on room air   - incentive spirometry as tolerated  montelukast daily    RENAL:    - IV lock / IV fluids @  cc/hr  - indwelling dumont / Intermittent straight catheterization  - voiding well    GASTRO:   - advance diet as tolerated  - diet, regular / CCD  - last BM  - bowel regimen and stress ulcer ppx as below:  polyethylene glycol 3350 two times a day  tamsulosin at bedtime    HEME:    - post op anemia, monitor H/H    - LE dopplers  ---> DVT prophylaxis: SCDs, SQL  enoxaparin Injectable every 24 hours    ENDO:   - goal euglycemia FS   atorvastatin at bedtime    ID:   - afebrile, non toxic appearing  - leukocytosis  - received post-op antibiotics  erythromycin    ethylsuccinate Suspension 40 mG/mL every 12 hours      Misc:  acyclovir Topical 5% Ointment five times a day  calamine/zinc oxide Lotion daily  ergocalciferol every week  multivitamin daily  sodium chloride 0.65% Nasal two times a day PRN Nasal Congestion  sodium phosphate 30 milliMole(s)/500 mL IVPB once  voquenza  10 mg 10 milliGRAM(s) daily    Disposition:  PT/OT/PMR recommends Home / [facility] / AR      Will discuss with Dr. Cantrell/ Shaniqueink: [id number] Assessment:  74F on celecoxib for OA last took y’day otherwise no AC/AP h/o HTN, gastroparesis, peripheral neuropathy, multiple prior thoracolumbar spine surgeries for congenital scoliosis done >10 years ago out of state w/ removal of hardware 1991 presents with back pain s/p mechanical fall from sitting y'day. Describes severe mid back pain and left hip pain, no numbness, weakness, saddle anesthesia, b/b symptoms. Pain worsened with movement, axial loading. CT TL spine w/ acute nondisplaced fracture of T12 veterbral body extending into right pedicle w/o clear cord impingement. Post-op course c/w recurrent brief Afib and anemia.      POD # 12  T9-L3 posterior instrumentation and fusion, PSO and interbody w/ plastic closure    Plan:    NEURO:    - q4h neuro/vital checks  - thoracolumbar fusion c/b CSF leak, primarily repaired, no evidence of positional HA, incision, superficial collection soft to palpation  - activity as tolerated, TLSO brace when OOB  - keep HOB 45 degrees for upper back collection  - drains removed 10/5  - CT T/L spine and CT Chest reviewed by Dr tSroud: hardware in good placement, b/l pleural effusions noted w/ atelectasis  - scoliosis xray pending  - chronic pain consult - pending  - switch low dose oxycodone to low dose dilaudid PO, per NSGY, avoid somnolence  - increase lyrica to 75 TID, c/w tylenol ATC, robaxin TID  - restless leg syndrome, continue home dose Pramipaxole  - PT recs AR    CARDS:    - H/o afib and HTN, keep -160, continue chlorthadione, losartan, metoprolol  - orthostatic hypotension, wean midodrine as tolerated 2.5mg TID, wean to off tomorrow  - HLD, continue atorvastatin  - TTE EF 61%    PULM:   - keep O2 sat > 92%, on room air, continue with montelukast   - incentive spirometry as tolerated  - b/l pleural effusion with atelectasis seen on CT Chest 10/10 - s/p lasix 20 x1 10/11 with good urinary response, c/w chlorthalidone    RENAL:    - IV lock   - indwelling dumont replaced 10/9, h/o urinary retention, continue with tamsulosin at bedtime  - hyponatremia improving, Na 134    GASTRO:   - diet, regular   - last BM 10/10, c/o diarrhea, hold bowel regimen  - elevated ALK phos, trending upwards, will monitor ?medication induced; normal levels of AST/ALT/lipase  - h/o gastroparesis, continue with erythromycin ethylsuccinate Suspension 40 mG/mL every 12 hours  - h/o GERD, continue with voquenza, no evidence of GI bleed on CT A/P    HEME:    - post op anemia, stable, s/p blood tranfusion intraop  - LE dopplers negative 9/30  ---> DVT prophylaxis: SCDs, SQL    ENDO:   - goal euglycemia FS   - hypophosphatemia, supplemented    ID:   - afebrile, non toxic appearing  - received post-op antibiotics    Misc:  acyclovir Topical 5% Ointment five times a day  - h/o pruritis c/w calamine/zinc oxide Lotion daily, hold off on benadryl  - h/o vit d deficiency, continue with ergocalciferol every week, multivitamin daily    Disposition:  PT/OT/PMR recommends AR    hospitalist borisgement appreciated, plan discussed with Dr. Stroud    Will discuss with Dr. Stroud  TEAMs/ Spectralink: [id number] Assessment:  74F on celecoxib for OA last took y’day otherwise no AC/AP h/o HTN, gastroparesis, peripheral neuropathy, multiple prior thoracolumbar spine surgeries for congenital scoliosis done >10 years ago out of state w/ removal of hardware 1991 presents with back pain s/p mechanical fall from sitting y'day. Describes severe mid back pain and left hip pain, no numbness, weakness, saddle anesthesia, b/b symptoms. Pain worsened with movement, axial loading. CT TL spine w/ acute nondisplaced fracture of T12 veterbral body extending into right pedicle w/o clear cord impingement. Post-op course c/w recurrent brief Afib and anemia.      POD # 12  T9-L3 posterior instrumentation and fusion, PSO and interbody w/ plastic closure    Plan:    NEURO:    - q4h neuro/vital checks  - thoracolumbar fusion c/b CSF leak, primarily repaired, no evidence of positional HA, incision, superficial collection soft to palpation  - activity as tolerated, TLSO brace when OOB  - keep HOB 45 degrees for upper back collection  - drains removed 10/5  - CT T/L spine and CT Chest reviewed by Dr Stroud: hardware in good placement, b/l pleural effusions noted w/ atelectasis  - scoliosis xray pending  - chronic pain consult - pending  - switch low dose oxycodone to low dose dilaudid PO, per NSGY, avoid somnolence  - increase lyrica to 75 TID, c/w tylenol ATC, robaxin TID  - restless leg syndrome, continue home dose Pramipaxole  - PT recs AR    CARDS:    - H/o afib and HTN, keep -160, continue chlorthadione, losartan, metoprolol  - orthostatic hypotension, wean midodrine as tolerated 2.5mg TID, wean to off tomorrow  - HLD, continue atorvastatin  - TTE EF 61%    PULM:   - keep O2 sat > 92%, on room air, continue with montelukast   - incentive spirometry as tolerated  - b/l pleural effusion with atelectasis seen on CT Chest 10/10 - s/p lasix 20 x1 10/11 with good urinary response, c/w chlorthalidone    RENAL:    - IV lock   - indwelling dumont replaced 10/9, h/o urinary retention, continue with tamsulosin at bedtime  - hyponatremia improving, Na 134    GASTRO:   - diet, regular   - last BM 10/10, c/o diarrhea, hold bowel regimen  - elevated ALK phos, trending upwards, will monitor ?medication induced; normal levels of AST/ALT/lipase  - h/o gastroparesis, continue with erythromycin ethylsuccinate Suspension 40 mG/mL every 12 hours  - h/o GERD, continue with voquenza, no evidence of GI bleed on CT A/P  - h/o incidental Liver cyst; outpt f/u with 1 month of discharge.      HEME:    - post op anemia, stable, s/p blood tranfusion intraop  - LE dopplers negative 9/30  ---> DVT prophylaxis: SCDs, SQL    ENDO:   - goal euglycemia FS   - hypophosphatemia, supplemented    ID:   - afebrile, non toxic appearing  - received post-op antibiotics    Misc:  acyclovir Topical 5% Ointment five times a day  - h/o pruritis c/w calamine/zinc oxide Lotion daily, hold off on benadryl  - h/o vit d deficiency, continue with ergocalciferol every week, multivitamin daily    Disposition:  PT/OT/PMR recommends AR    hospitalist borisgement appreciated, plan discussed with Dr. Stroud    Will discuss with Dr. Stroud  TEAMs/ Spectralink: [id number]

## 2024-10-12 DIAGNOSIS — E87.1 HYPO-OSMOLALITY AND HYPONATREMIA: ICD-10-CM

## 2024-10-12 DIAGNOSIS — Z29.9 ENCOUNTER FOR PROPHYLACTIC MEASURES, UNSPECIFIED: ICD-10-CM

## 2024-10-12 LAB
ANION GAP SERPL CALC-SCNC: 13 MMOL/L — SIGNIFICANT CHANGE UP (ref 5–17)
BUN SERPL-MCNC: 10 MG/DL — SIGNIFICANT CHANGE UP (ref 7–23)
CALCIUM SERPL-MCNC: 9 MG/DL — SIGNIFICANT CHANGE UP (ref 8.4–10.5)
CHLORIDE SERPL-SCNC: 97 MMOL/L — SIGNIFICANT CHANGE UP (ref 96–108)
CO2 SERPL-SCNC: 22 MMOL/L — SIGNIFICANT CHANGE UP (ref 22–31)
CREAT SERPL-MCNC: 0.65 MG/DL — SIGNIFICANT CHANGE UP (ref 0.5–1.3)
EGFR: 91 ML/MIN/1.73M2 — SIGNIFICANT CHANGE UP
GLUCOSE SERPL-MCNC: 100 MG/DL — HIGH (ref 70–99)
MAGNESIUM SERPL-MCNC: 2 MG/DL — SIGNIFICANT CHANGE UP (ref 1.6–2.6)
PHOSPHATE SERPL-MCNC: 2.5 MG/DL — SIGNIFICANT CHANGE UP (ref 2.5–4.5)
POTASSIUM SERPL-MCNC: 3.9 MMOL/L — SIGNIFICANT CHANGE UP (ref 3.5–5.3)
POTASSIUM SERPL-SCNC: 3.9 MMOL/L — SIGNIFICANT CHANGE UP (ref 3.5–5.3)
SODIUM SERPL-SCNC: 132 MMOL/L — LOW (ref 135–145)

## 2024-10-12 PROCEDURE — 99232 SBSQ HOSP IP/OBS MODERATE 35: CPT

## 2024-10-12 PROCEDURE — 72070 X-RAY EXAM THORAC SPINE 2VWS: CPT | Mod: 26

## 2024-10-12 RX ORDER — LOPERAMIDE HYDROCHLORIDE 2 MG/1
2 CAPSULE ORAL ONCE
Refills: 0 | Status: COMPLETED | OUTPATIENT
Start: 2024-10-12 | End: 2024-10-12

## 2024-10-12 RX ADMIN — Medication 500 MILLIGRAM(S): at 01:35

## 2024-10-12 RX ADMIN — PREGABALIN 75 MILLIGRAM(S): 25 CAPSULE ORAL at 13:07

## 2024-10-12 RX ADMIN — PREGABALIN 75 MILLIGRAM(S): 25 CAPSULE ORAL at 05:31

## 2024-10-12 RX ADMIN — Medication 25 MILLIGRAM(S): at 17:24

## 2024-10-12 RX ADMIN — PREGABALIN 75 MILLIGRAM(S): 25 CAPSULE ORAL at 21:26

## 2024-10-12 RX ADMIN — Medication 17 GRAM(S): at 17:23

## 2024-10-12 RX ADMIN — PRAMIPEXOLE DIHYDROCHLORIDE 3.75 MILLIGRAM(S): 0.5 TABLET ORAL at 21:26

## 2024-10-12 RX ADMIN — MONTELUKAST SODIUM 10 MILLIGRAM(S): 10 TABLET, FILM COATED ORAL at 11:19

## 2024-10-12 RX ADMIN — Medication 0.4 MILLIGRAM(S): at 21:25

## 2024-10-12 RX ADMIN — HYDROMORPHONE HYDROCHLORIDE 2 MILLIGRAM(S): 1 INJECTION, SOLUTION INTRAMUSCULAR; INTRAVENOUS; SUBCUTANEOUS at 23:25

## 2024-10-12 RX ADMIN — HYDROMORPHONE HYDROCHLORIDE 2 MILLIGRAM(S): 1 INJECTION, SOLUTION INTRAMUSCULAR; INTRAVENOUS; SUBCUTANEOUS at 02:35

## 2024-10-12 RX ADMIN — Medication 25 MILLIGRAM(S): at 05:32

## 2024-10-12 RX ADMIN — LOSARTAN POTASSIUM 100 MILLIGRAM(S): 100 TABLET, FILM COATED ORAL at 05:32

## 2024-10-12 RX ADMIN — PRAMIPEXOLE DIHYDROCHLORIDE 1 MILLIGRAM(S): 0.5 TABLET ORAL at 05:32

## 2024-10-12 RX ADMIN — Medication 128 MILLIGRAM(S): at 18:58

## 2024-10-12 RX ADMIN — MULTI VITAMIN/MINERAL SUPPLEMENT WITH ASCORBIC ACID, NIACIN, PYRIDOXINE, PANTOTHENIC ACID, FOLIC ACID, RIBOFLAVIN, THIAMIN, BIOTIN, COBALAMIN AND ZINC. 1 TABLET(S): 60; 20; 12.5; 10; 10; 1.7; 1.5; 1; .3; .006 TABLET, COATED ORAL at 11:19

## 2024-10-12 RX ADMIN — Medication 20 MILLIGRAM(S): at 11:19

## 2024-10-12 RX ADMIN — Medication 17 GRAM(S): at 05:33

## 2024-10-12 RX ADMIN — Medication 1000 MILLIGRAM(S): at 14:00

## 2024-10-12 RX ADMIN — HYDROMORPHONE HYDROCHLORIDE 2 MILLIGRAM(S): 1 INJECTION, SOLUTION INTRAMUSCULAR; INTRAVENOUS; SUBCUTANEOUS at 22:27

## 2024-10-12 RX ADMIN — Medication 1 APPLICATION(S): at 11:19

## 2024-10-12 RX ADMIN — ENOXAPARIN SODIUM 40 MILLIGRAM(S): 150 INJECTION SUBCUTANEOUS at 17:24

## 2024-10-12 RX ADMIN — LOPERAMIDE HYDROCHLORIDE 2 MILLIGRAM(S): 2 CAPSULE ORAL at 22:27

## 2024-10-12 RX ADMIN — Medication 1000 MILLIGRAM(S): at 21:25

## 2024-10-12 RX ADMIN — HYDROMORPHONE HYDROCHLORIDE 2 MILLIGRAM(S): 1 INJECTION, SOLUTION INTRAMUSCULAR; INTRAVENOUS; SUBCUTANEOUS at 06:37

## 2024-10-12 RX ADMIN — Medication 1000 MILLIGRAM(S): at 05:32

## 2024-10-12 RX ADMIN — Medication 128 MILLIGRAM(S): at 06:32

## 2024-10-12 RX ADMIN — HYDROMORPHONE HYDROCHLORIDE 1 MILLIGRAM(S): 1 INJECTION, SOLUTION INTRAMUSCULAR; INTRAVENOUS; SUBCUTANEOUS at 04:41

## 2024-10-12 RX ADMIN — ATORVASTATIN CALCIUM 40 MILLIGRAM(S): 10 TABLET, FILM COATED ORAL at 21:26

## 2024-10-12 RX ADMIN — HYDROMORPHONE HYDROCHLORIDE 2 MILLIGRAM(S): 1 INJECTION, SOLUTION INTRAMUSCULAR; INTRAVENOUS; SUBCUTANEOUS at 14:08

## 2024-10-12 RX ADMIN — HYDROMORPHONE HYDROCHLORIDE 2 MILLIGRAM(S): 1 INJECTION, SOLUTION INTRAMUSCULAR; INTRAVENOUS; SUBCUTANEOUS at 13:08

## 2024-10-12 RX ADMIN — Medication 1000 MILLIGRAM(S): at 13:08

## 2024-10-12 RX ADMIN — HYDROMORPHONE HYDROCHLORIDE 2 MILLIGRAM(S): 1 INJECTION, SOLUTION INTRAMUSCULAR; INTRAVENOUS; SUBCUTANEOUS at 18:23

## 2024-10-12 RX ADMIN — Medication 1000 MILLIGRAM(S): at 06:36

## 2024-10-12 RX ADMIN — HYDROMORPHONE HYDROCHLORIDE 2 MILLIGRAM(S): 1 INJECTION, SOLUTION INTRAMUSCULAR; INTRAVENOUS; SUBCUTANEOUS at 01:35

## 2024-10-12 RX ADMIN — HYDROMORPHONE HYDROCHLORIDE 2 MILLIGRAM(S): 1 INJECTION, SOLUTION INTRAMUSCULAR; INTRAVENOUS; SUBCUTANEOUS at 05:32

## 2024-10-12 RX ADMIN — HYDROMORPHONE HYDROCHLORIDE 1 MILLIGRAM(S): 1 INJECTION, SOLUTION INTRAMUSCULAR; INTRAVENOUS; SUBCUTANEOUS at 03:41

## 2024-10-12 NOTE — PROGRESS NOTE ADULT - SUBJECTIVE AND OBJECTIVE BOX
Falguni Leone MD  Division of Hospital Medicine  [facility]  Spectra: [phone]      Patient is a 74y old  Female who presents with a chief complaint of Back pain 2/2 nondisplaced fracture of T12 (12 Oct 2024 07:54)      SUBJECTIVE / OVERNIGHT EVENTS: no acute events overnight. no fever, chills, chest pain, nor dyspnea. states pain better controlled with recent changes in pain regimen. admits to poor PO intake of solute and subjectively drinking a lot of water.   ADDITIONAL REVIEW OF SYSTEMS:    MEDICATIONS  (STANDING):  acetaminophen     Tablet .. 1000 milliGRAM(s) Oral every 8 hours  acyclovir Topical 5% Ointment 1 Application(s) Topical five times a day  atorvastatin 40 milliGRAM(s) Oral at bedtime  calamine/zinc oxide Lotion 1 Application(s) Topical daily  chlorthalidone 25 milliGRAM(s) Oral daily  DULoxetine 20 milliGRAM(s) Oral daily  enoxaparin Injectable 40 milliGRAM(s) SubCutaneous every 24 hours  ergocalciferol 61514 Unit(s) Oral every week  erythromycin    ethylsuccinate Suspension 40 mG/mL 128 milliGRAM(s) Oral every 12 hours  losartan 100 milliGRAM(s) Oral daily  metoprolol tartrate 25 milliGRAM(s) Oral two times a day  montelukast 10 milliGRAM(s) Oral daily  multivitamin 1 Tablet(s) Oral daily  polyethylene glycol 3350 17 Gram(s) Oral two times a day  pramipexole 3.75 milliGRAM(s) Oral at bedtime  pramipexole 1 milliGRAM(s) Oral <User Schedule>  pregabalin 75 milliGRAM(s) Oral three times a day  tamsulosin 0.4 milliGRAM(s) Oral at bedtime  voquenza  10 mg 10 milliGRAM(s) 1 Tablet(s) Oral daily    MEDICATIONS  (PRN):  diphenhydrAMINE 25 milliGRAM(s) Oral every 8 hours PRN Rash and/or Itching  HYDROmorphone   Solution 1 milliGRAM(s) Oral every 4 hours PRN Moderate Pain (4 - 6)  HYDROmorphone   Tablet 2 milliGRAM(s) Oral every 4 hours PRN Severe Pain (7 - 10)  methocarbamol 500 milliGRAM(s) Oral every 8 hours PRN Muscle Spasm  sodium chloride 0.65% Nasal 1 Spray(s) Both Nostrils two times a day PRN Nasal Congestion      CAPILLARY BLOOD GLUCOSE        I&O's Summary    11 Oct 2024 07:01  -  12 Oct 2024 07:00  --------------------------------------------------------  IN: 720 mL / OUT: 5150 mL / NET: -4430 mL        PHYSICAL EXAM:  Vital Signs Last 24 Hrs  T(C): 36.6 (12 Oct 2024 12:35), Max: 36.8 (12 Oct 2024 08:17)  T(F): 97.8 (12 Oct 2024 12:35), Max: 98.3 (12 Oct 2024 08:17)  HR: 71 (12 Oct 2024 12:35) (69 - 84)  BP: 145/80 (12 Oct 2024 12:35) (130/68 - 179/74)  BP(mean): --  RR: 18 (12 Oct 2024 12:35) (18 - 18)  SpO2: 97% (12 Oct 2024 12:35) (95% - 99%)    Parameters below as of 12 Oct 2024 12:35  Patient On (Oxygen Delivery Method): room air      CONSTITUTIONAL: NAD, well-developed, well-groomed  EYES: PERRLA; conjunctiva and sclera clear  ENMT: Moist oral mucosa, no pharyngeal injection or exudates; normal dentition  NECK: Supple, no palpable masses; no thyromegaly  RESPIRATORY: Normal respiratory effort; lungs are clear to auscultation bilaterally  CARDIOVASCULAR: Regular rate and rhythm, normal S1 and S2, no murmur/rub/gallop; No lower extremity edema  ABDOMEN: Soft, Nondistended, Nontender to palpation, normoactive bowel sounds  MUSCULOSKELETAL: No clubbing or cyanosis of digits; no joint swelling or tenderness to palpation  PSYCH: A+O to person, place, and time; affect appropriate  NEUROLOGY: CN 2-12 are intact and symmetric; no gross sensory deficits, grossly 5/5 strength throughout  SKIN: No rashes; no palpable lesions    LABS:                        8.8    7.91  )-----------( 478      ( 11 Oct 2024 07:28 )             27.6     10-12    132[L]  |  97  |  10  ----------------------------<  100[H]  3.9   |  22  |  0.65    Ca    9.0      12 Oct 2024 06:25  Phos  2.5     10-12  Mg     2.0     10-12    TPro  6.2  /  Alb  3.4  /  TBili  0.4  /  DBili  x   /  AST  31  /  ALT  36  /  AlkPhos  206[H]  10-11      Urinalysis Basic - ( 12 Oct 2024 06:25 )    Color: x / Appearance: x / SG: x / pH: x  Gluc: 100 mg/dL / Ketone: x  / Bili: x / Urobili: x   Blood: x / Protein: x / Nitrite: x   Leuk Esterase: x / RBC: x / WBC x   Sq Epi: x / Non Sq Epi: x / Bacteria: x        RADIOLOGY & ADDITIONAL TESTS:  Results Reviewed: persistent hyponatremia but stable  Imaging Personally Reviewed:  Electrocardiogram Personally Reviewed:    COORDINATION OF CARE:  Care Discussed with Consultants/Other Providers [Y]: Neurosurgery LIZZY Godinez  Prior or Outpatient Records Reviewed [Y/N]:

## 2024-10-12 NOTE — PROGRESS NOTE ADULT - SUBJECTIVE AND OBJECTIVE BOX
Haley was awake and alert in bed as I measured, sized and fit hr with an [place] TLSO with shoulder straps as she rolled side to side. She was instructed on donning and adjustment of the brace and given printed instructions and contact info. Brace was labeled and placed on bed side chair for use OOB.   [facility]  [phone]

## 2024-10-12 NOTE — PROGRESS NOTE ADULT - ASSESSMENT
74F h/o HTN, gastroparesis, peripheral neuropathy, multiple prior thoracolumbar spine surgeries for congenital scoliosis presents with back pain s/p mechanical fall from sitting. PTA she sat down on her walker bec her legs felt fatigued and started pushing herself around but fell off curb. Found with T12 three column spine fracture-malalignment with fracture extending to the adjacent right 12th posterior rib and left T12-L1 facet joint s/p T9-L3 fusion on 9/29 c EBL 1475 cc. post-op course c/b recurrent brief Afib and anemia.

## 2024-10-12 NOTE — PROGRESS NOTE ADULT - SUBJECTIVE AND OBJECTIVE BOX
SUBJECTIVE:     OVERNIGHT EVENTS:      Vital Signs Last 24 Hrs  T(C): 36.5 (12 Oct 2024 05:20), Max: 36.8 (11 Oct 2024 13:02)  T(F): 97.7 (12 Oct 2024 05:20), Max: 98.2 (11 Oct 2024 13:02)  HR: 84 (12 Oct 2024 05:20) (70 - 93)  BP: 159/81 (12 Oct 2024 05:20) (139/65 - 179/74)  BP(mean): --  RR: 18 (12 Oct 2024 05:20) (18 - 18)  SpO2: 95% (12 Oct 2024 05:20) (95% - 99%)    Parameters below as of 12 Oct 2024 05:20  Patient On (Oxygen Delivery Method): room air          PHYSICAL EXAM:    Constitutional: No Acute Distress     Neurological: AOx3, Following Commands, Moving all Extremities (pain limited exam)    Motor exam:          Upper extremity                         Delt     Bicep     Tricep    HG                                                 R         5/5        5/5        5/5       5/5                                               L          5/5        5/5        5/5       5/5          Lower extremity                        HF         KF        KE       DF         PF                                                  R        5/5        5/5        5/5       5/5         5/5                                               L         5/5        5/5       5/5       5/5          5/5                                                 Sensation: [x] intact to light touch  [] decreased:     Pulmonary: Clear to Auscultation, No rales, No rhonchi, No wheezes     Cardiovascular: S1, S2, Regular rate and rhythm     Gastrointestinal: Soft, Non-tender, Non-distended     Extremities: No calf tenderness     Incision: c/d/i, upper superficial collection, soft     LABS:                          8.8    7.91  )-----------( 478      ( 11 Oct 2024 07:28 )             27.6    10-11    134[L]  |  101  |  9   ----------------------------<  93  4.7   |  21[L]  |  0.66    Ca    8.5      11 Oct 2024 07:28  Phos  1.7     10-11  Mg     2.3     10-11    TPro  6.2  /  Alb  3.4  /  TBili  0.4  /  DBili  x   /  AST  31  /  ALT  36  /  AlkPhos  206[H]  10-11      I&O's Summary    11 Oct 2024 07:01  -  12 Oct 2024 07:00  --------------------------------------------------------  IN: 720 mL / OUT: 5150 mL / NET: -4430 mL          DRAINS: none    MEDICATIONS:  Anticoagulation:   enoxaparin Injectable 40 milliGRAM(s) SubCutaneous every 24 hours    Antibiotics:  erythromycin    ethylsuccinate Suspension 40 mG/mL 128 milliGRAM(s) Oral every 12 hours    Endo:  atorvastatin 40 milliGRAM(s) Oral at bedtime    Neuro:  acetaminophen     Tablet .. 1000 milliGRAM(s) Oral every 8 hours  DULoxetine 20 milliGRAM(s) Oral daily  HYDROmorphone   Solution 1 milliGRAM(s) Oral every 4 hours PRN Moderate Pain (4 - 6)  HYDROmorphone   Tablet 2 milliGRAM(s) Oral every 4 hours PRN Severe Pain (7 - 10)  methocarbamol 500 milliGRAM(s) Oral every 8 hours  pramipexole 3.75 milliGRAM(s) Oral at bedtime  pramipexole 1 milliGRAM(s) Oral <User Schedule>  pregabalin 75 milliGRAM(s) Oral three times a day    Cardiac:  chlorthalidone 25 milliGRAM(s) Oral daily  losartan 100 milliGRAM(s) Oral daily  metoprolol tartrate 25 milliGRAM(s) Oral two times a day  midodrine 2.5 milliGRAM(s) Oral <User Schedule>    Pulm:  montelukast 10 milliGRAM(s) Oral daily    GI/:  polyethylene glycol 3350 17 Gram(s) Oral two times a day  tamsulosin 0.4 milliGRAM(s) Oral at bedtime    Other:   acyclovir Topical 5% Ointment 1 Application(s) Topical five times a day  calamine/zinc oxide Lotion 1 Application(s) Topical daily  ergocalciferol 68180 Unit(s) Oral every week  multivitamin 1 Tablet(s) Oral daily  sodium chloride 0.65% Nasal 1 Spray(s) Both Nostrils two times a day PRN Nasal Congestion  sodium phosphate 30 milliMole(s)/500 mL IVPB 30 milliMole(s) IV Intermittent once  voquenza  10 mg 10 milliGRAM(s) 1 Tablet(s) Oral daily    DIET:  Regular    IMAGING:   Recent imaging studies were reviewed.    < from: CT Abdomen and Pelvis w/ IV Cont (10.10.24 @ 04:32) >  IMPRESSION:  Unchanged postoperative appearance of the spine with T12 fracture again   noted.    No evidence of GI bleed.    --- End of Report ---    < end of copied text >      < from: CT Chest w/ IV Cont (10.10.24 @ 04:33) >  FINDINGS:  CHEST:  LUNGS AND LARGE AIRWAYS: Patent central airways. Bilateral pleural   effusions with adjacent atelectasis.  VESSELS: Aortic calcifications. Coronary artery calcifications.  HEART: Heart size is normal. No pericardial effusion.  MEDIASTINUM AND PILAR: No lymphadenopathy. Small hiatal hernia.  CHEST WALL AND LOWER NECK: Within normal limits.    ABDOMEN AND PELVIS:  Evaluation is limited due to streak artifact.    LIVER: Thereare is a large cyst, stable since prior exam. There are   small hypodense foci on segment II and segment I too small to   characterize, unchanged since prior.  BILE DUCTS: Normal caliber.  GALLBLADDER: Within normal limits.  SPLEEN: Within normal limits.  PANCREAS: Within normal limits.  ADRENALS: Within normal limits.  KIDNEYS/URETERS: Within normal limits.    BLADDER: Limited evaluation.  REPRODUCTIVE ORGANS: Limited evaluation.    BOWEL: No bowel obstruction. Appendix is normal. No bleeding identified.  PERITONEUM/RETROPERITONEUM: Within normal limits.  VESSELS: Atherosclerotic changes.  LYMPH NODES: No lymphadenopathy.  ABDOMINAL WALL: Left abdominal wall laxity.  BONES: Right shoulder arthroplasty. Bilateral hip arthroplasties. Status   post spinal fusion of T9-L3 with bilateral rods and screws and   cylindrical cage within the superior T12 vertebral body fracture   fragments. Mild ill-defined fluid is noted overlying the surgical bed.   Resection of the left T12 pedicle and left hemilaminectomy. Degenerative   changes. Left lateral 7th through 10th rib fractures. Chronic appearing   right rib fractures. Heterogeneity of the sacrum and SI joints again   noted.    IMPRESSION:  Unchanged postoperative appearance of the spine with T12 fracture again   noted.    < end of copied text >    < from: CT Lumbar Spine No Cont (10.05.24 @ 12:24) >    IMPRESSION:    Status post spinal fusion of T9-L3 with bilateral rods and screws and   cylindrical cage within the fractured T12 vertebral body. Resection of   the LEFT T12 pedicle and a LEFT hemilaminectomy. Hardware appears intact.   No evidence of hardware loosening. Expected postoperative changes. No   evidence of paraspinal hematoma.    --- End of Report ---    < end of copied text >    < from: [facility] Duplex Lower Ext Vein Scan, [place] (09.30.24 @ 16:57) >  IMPRESSION:  No evidence of deep venous thrombosis in either lower extremity.    < end of copied text >   SUBJECTIVE:  Patient seen and examined at bedside. Patient reports slight improvement in pain with PO dilaudid. Request for her TLSO to be refitted.     OVERNIGHT EVENTS:  none    Vital Signs Last 24 Hrs  T(C): 36.5 (12 Oct 2024 05:20), Max: 36.8 (11 Oct 2024 13:02)  T(F): 97.7 (12 Oct 2024 05:20), Max: 98.2 (11 Oct 2024 13:02)  HR: 84 (12 Oct 2024 05:20) (70 - 93)  BP: 159/81 (12 Oct 2024 05:20) (139/65 - 179/74)  BP(mean): --  RR: 18 (12 Oct 2024 05:20) (18 - 18)  SpO2: 95% (12 Oct 2024 05:20) (95% - 99%)    Parameters below as of 12 Oct 2024 05:20  Patient On (Oxygen Delivery Method): room air          PHYSICAL EXAM:    Constitutional: No Acute Distress     Neurological: AOx3, Following Commands, Moving all Extremities (pain limited exam)    Motor exam:          Upper extremity                         Delt     Bicep     Tricep    HG                                                 R         5/5        5/5        5/5       5/5                                               L          5/5        5/5        5/5       5/5          Lower extremity                        HF         KF        KE       DF         PF                                                  R        5/5        5/5        5/5       5/5         5/5                                               L         5/5        5/5       5/5       5/5          5/5                                                 Sensation: [x] intact to light touch  [] decreased:     Pulmonary: Clear to Auscultation, No rales, No rhonchi, No wheezes     Cardiovascular: S1, S2, Regular rate and rhythm     Gastrointestinal: Soft, Non-tender, Non-distended     Extremities: No calf tenderness     Incision: c/d/i, upper superficial collection, soft     LABS:                          8.8    7.91  )-----------( 478      ( 11 Oct 2024 07:28 )             27.6    10-11    134[L]  |  101  |  9   ----------------------------<  93  4.7   |  21[L]  |  0.66    Ca    8.5      11 Oct 2024 07:28  Phos  1.7     10-11  Mg     2.3     10-11    TPro  6.2  /  Alb  3.4  /  TBili  0.4  /  DBili  x   /  AST  31  /  ALT  36  /  AlkPhos  206[H]  10-11      I&O's Summary    11 Oct 2024 07:01  -  12 Oct 2024 07:00  --------------------------------------------------------  IN: 720 mL / OUT: 5150 mL / NET: -4430 mL          DRAINS: none    MEDICATIONS:  Anticoagulation:   enoxaparin Injectable 40 milliGRAM(s) SubCutaneous every 24 hours    Antibiotics:  erythromycin    ethylsuccinate Suspension 40 mG/mL 128 milliGRAM(s) Oral every 12 hours    Endo:  atorvastatin 40 milliGRAM(s) Oral at bedtime    Neuro:  acetaminophen     Tablet .. 1000 milliGRAM(s) Oral every 8 hours  DULoxetine 20 milliGRAM(s) Oral daily  HYDROmorphone   Solution 1 milliGRAM(s) Oral every 4 hours PRN Moderate Pain (4 - 6)  HYDROmorphone   Tablet 2 milliGRAM(s) Oral every 4 hours PRN Severe Pain (7 - 10)  methocarbamol 500 milliGRAM(s) Oral every 8 hours  pramipexole 3.75 milliGRAM(s) Oral at bedtime  pramipexole 1 milliGRAM(s) Oral <User Schedule>  pregabalin 75 milliGRAM(s) Oral three times a day    Cardiac:  chlorthalidone 25 milliGRAM(s) Oral daily  losartan 100 milliGRAM(s) Oral daily  metoprolol tartrate 25 milliGRAM(s) Oral two times a day  midodrine 2.5 milliGRAM(s) Oral <User Schedule>    Pulm:  montelukast 10 milliGRAM(s) Oral daily    GI/:  polyethylene glycol 3350 17 Gram(s) Oral two times a day  tamsulosin 0.4 milliGRAM(s) Oral at bedtime    Other:   acyclovir Topical 5% Ointment 1 Application(s) Topical five times a day  calamine/zinc oxide Lotion 1 Application(s) Topical daily  ergocalciferol 40219 Unit(s) Oral every week  multivitamin 1 Tablet(s) Oral daily  sodium chloride 0.65% Nasal 1 Spray(s) Both Nostrils two times a day PRN Nasal Congestion  sodium phosphate 30 milliMole(s)/500 mL IVPB 30 milliMole(s) IV Intermittent once  voquenza  10 mg 10 milliGRAM(s) 1 Tablet(s) Oral daily    DIET:  Regular    IMAGING:   Recent imaging studies were reviewed.    < from: CT Abdomen and Pelvis w/ IV Cont (10.10.24 @ 04:32) >  IMPRESSION:  Unchanged postoperative appearance of the spine with T12 fracture again   noted.    No evidence of GI bleed.    --- End of Report ---    < end of copied text >      < from: CT Chest w/ IV Cont (10.10.24 @ 04:33) >  FINDINGS:  CHEST:  LUNGS AND LARGE AIRWAYS: Patent central airways. Bilateral pleural   effusions with adjacent atelectasis.  VESSELS: Aortic calcifications. Coronary artery calcifications.  HEART: Heart size is normal. No pericardial effusion.  MEDIASTINUM AND PILAR: No lymphadenopathy. Small hiatal hernia.  CHEST WALL AND LOWER NECK: Within normal limits.    ABDOMEN AND PELVIS:  Evaluation is limited due to streak artifact.    LIVER: Thereare is a large cyst, stable since prior exam. There are   small hypodense foci on segment II and segment I too small to   characterize, unchanged since prior.  BILE DUCTS: Normal caliber.  GALLBLADDER: Within normal limits.  SPLEEN: Within normal limits.  PANCREAS: Within normal limits.  ADRENALS: Within normal limits.  KIDNEYS/URETERS: Within normal limits.    BLADDER: Limited evaluation.  REPRODUCTIVE ORGANS: Limited evaluation.    BOWEL: No bowel obstruction. Appendix is normal. No bleeding identified.  PERITONEUM/RETROPERITONEUM: Within normal limits.  VESSELS: Atherosclerotic changes.  LYMPH NODES: No lymphadenopathy.  ABDOMINAL WALL: Left abdominal wall laxity.  BONES: Right shoulder arthroplasty. Bilateral hip arthroplasties. Status   post spinal fusion of T9-L3 with bilateral rods and screws and   cylindrical cage within the superior T12 vertebral body fracture   fragments. Mild ill-defined fluid is noted overlying the surgical bed.   Resection of the left T12 pedicle and left hemilaminectomy. Degenerative   changes. Left lateral 7th through 10th rib fractures. Chronic appearing   right rib fractures. Heterogeneity of the sacrum and SI joints again   noted.    IMPRESSION:  Unchanged postoperative appearance of the spine with T12 fracture again   noted.    < end of copied text >    < from: CT Lumbar Spine No Cont (10.05.24 @ 12:24) >    IMPRESSION:    Status post spinal fusion of T9-L3 with bilateral rods and screws and   cylindrical cage within the fractured T12 vertebral body. Resection of   the LEFT T12 pedicle and a LEFT hemilaminectomy. Hardware appears intact.   No evidence of hardware loosening. Expected postoperative changes. No   evidence of paraspinal hematoma.    --- End of Report ---    < end of copied text >    < from: [facility] Duplex Lower Ext Vein Scan, [place] (09.30.24 @ 16:57) >  IMPRESSION:  No evidence of deep venous thrombosis in either lower extremity.    < end of copied text >

## 2024-10-12 NOTE — PROGRESS NOTE ADULT - ASSESSMENT
Assessment:  74F on celecoxib for OA last took y’day otherwise no AC/AP h/o HTN, gastroparesis, peripheral neuropathy, multiple prior thoracolumbar spine surgeries for congenital scoliosis [place] >[place]/ [place] of hardware 1991 presents with back pain s/p mechanical fall from sitting y'day. Describes severe mid back pain and left hip pain, no numbness, weakness, saddle anesthesia, b/b symptoms. Pain worsened with movement, axial loading. CT TL spine w/ acute nondisplaced fracture of T12 veterbral body extending into right pedicle w/o clear cord impingement. Post-op course c/w recurrent brief Afib and anemia.      POD # 13  T9-L3 posterior instrumentation and fusion, PSO and interbody w/ plastic closure    Plan:    NEURO:    - q4h neuro/vital checks  - thoracolumbar fusion c/b CSF leak, primarily repaired, no evidence of positional HA, incision, superficial collection soft to palpation  - activity as tolerated, TLSO brace when OOB  - keep HOB 45 degrees for upper back collection  - drains removed 10/5  - CT T/L spine and CT Chest reviewed by Dr Stroud: hardware in good placement, b/l pleural effusions noted w/ atelectasis  - scoliosis xray pending  - chronic pain consult - pending  - switch low dose oxycodone to low dose dilaudid PO, per NSGY, avoid somnolence  - increase lyrica to 75 TID, c/w tylenol ATC, robaxin TID made PRN  - restless leg syndrome, continue home dose Pramipaxole  - PT recs AR    CARDS:    - H/o afib and HTN, keep -160, continue chlorthadione, losartan, metoprolol  - orthostatic hypotension, wean midodrine as tolerated 2.5mg TID, wean to off tomorrow  - HLD, continue atorvastatin  - TTE EF 61%    PULM:   - keep O2 sat > 92%, on room air, continue with montelukast   - incentive spirometry as tolerated  - b/l pleural effusion with atelectasis seen on CT Chest 10/10 - s/p lasix 20 x1 10/11 with good urinary response, c/w chlorthalidone    RENAL:    - IV lock   - indwelling dumont replaced 10/9, h/o urinary retention, continue with tamsulosin at bedtime  - hyponatremia, continue to repeat BMP in AM    GASTRO:   - diet, regular   - last BM 10/10, c/o diarrhea, hold bowel regimen  - elevated ALK phos, trending upwards, will monitor ?medication induced; normal levels of AST/ALT/lipase  - h/o gastroparesis, continue with erythromycin ethylsuccinate Suspension 40 mG/mL every 12 hours  - h/o GERD, continue with voquenza, no evidence of GI bleed on CT A/P  - h/o incidental Liver cyst; outpt f/u with 1 month of discharge.      HEME:    - post op anemia, stable, s/p blood tranfusion intraop  - LE dopplers negative 9/30  ---> DVT prophylaxis: SCDs, SQL    ENDO:   - goal euglycemia FS   - hypophosphatemia, supplemented    ID:   - afebrile, non toxic appearing  - received post-op antibiotics    Misc:  acyclovir Topical 5% Ointment five times a day  - h/o pruritis c/w calamine/zinc oxide Lotion daily, hold off on benadryl  - h/o vit d deficiency, continue with ergocalciferol every week, multivitamin daily    Disposition:  PT/OT/PMR recommends AR    hospitalist comanagement appreciated, plan discussed with Dr. Stroud    Will discuss with Dr. Stroud  TEAMs/ Spectralink: [phone] Assessment:  74F on celecoxib for OA last took y’day otherwise no AC/AP h/o HTN, gastroparesis, peripheral neuropathy, multiple prior thoracolumbar spine surgeries for congenital scoliosis done >10 years ago [place] of state w/ removal of hardware 1991 presents with back pain s/p mechanical fall from sitting y'day. Describes severe mid back pain and left hip pain, no numbness, weakness, saddle anesthesia, b/b symptoms. Pain worsened with movement, axial loading. CT TL spine w/ acute nondisplaced fracture of T12 veterbral body extending into right pedicle w/o clear cord impingement. Post-op course c/w recurrent brief Afib and anemia.      POD # 13  T9-L3 posterior instrumentation and fusion, PSO and interbody w/ plastic closure    Plan:    NEURO:    - q4h neuro/vital checks  - thoracolumbar fusion c/b CSF leak, primarily repaired, no evidence of positional HA, incision, superficial collection soft to palpation  - activity as tolerated, TLSO brace when OOB, to be refitted  - keep HOB 30 degrees for upper back collection  - drains removed 10/5  - CT T/L spine and CT Chest reviewed by Dr Stroud: hardware in good placement, b/l pleural effusions noted w/ atelectasis  - scoliosis xray pending  - chronic pain consult  - switch low dose oxycodone to low dose dilaudid PO, per NSGY, avoid somnolence  - increase lyrica to 75 TID, c/w tylenol ATC, robaxin TID made PRN  - restless leg syndrome, continue home dose Pramipaxole  - PT recs AR    CARDS:    - H/o afib and HTN, keep -160, continue chlorthadione, losartan, metoprolol  - orthostatic hypotension, midodrine weaned off  - HLD, continue atorvastatin  - TTE EF 61%    PULM:   - keep O2 sat > 92%, on room air, continue with montelukast   - incentive spirometry as tolerated  - b/l pleural effusion with atelectasis seen on CT Chest 10/10 - s/p lasix 20 x1 10/11 with good urinary response, c/w chlorthalidone    RENAL:    - IV lock   - indwelling dumont replaced 10/9, h/o urinary retention, continue with tamsulosin at bedtime, consider TOV today  - hyponatremia, continue to repeat BMP in AM    GASTRO:   - diet, regular   - last BM 10/10, c/o diarrhea, hold bowel regimen  - elevated ALK phos, trending upwards, will monitor ?medication induced; normal levels of AST/ALT/lipase  - h/o gastroparesis, continue with erythromycin ethylsuccinate Suspension 40 mG/mL every 12 hours  - h/o GERD, continue with voquenza, no evidence of GI bleed on CT A/P  - h/o incidental Liver cyst; outpt f/u with 1 month of discharge.      HEME:    - post op anemia, stable, s/p blood tranfusion intraop  - LE dopplers negative 9/30  ---> DVT prophylaxis: SCDs, SQL    ENDO:   - goal euglycemia FS   - hypophosphatemia, supplemented    ID:   - afebrile, non toxic appearing  - received post-op antibiotics    Misc:  acyclovir Topical 5% Ointment five times a day  - h/o pruritis c/w calamine/zinc oxide Lotion daily, hold off on benadryl  - h/o vit d deficiency, continue with ergocalciferol every week, multivitamin daily    Disposition:  PT/OT/PMR recommends AR    hospitalist borisgement appreciated, plan discussed with Dr. Stroud    Will discuss with Dr. Stroud  TEAMs/ Spectralink: [phone]

## 2024-10-12 NOTE — PROGRESS NOTE ADULT - PROBLEM SELECTOR PLAN 2
likely 2/2 SIADH in setting of pain and poor solute intake  - pain control as per NSGY  - add fluid restriction 1L/day  - encouraged patient to increase solute intake  - may need to add salt tabs if hyponatremia persists

## 2024-10-12 NOTE — PROGRESS NOTE ADULT - NSPROGADDITIONALINFOA_GEN_ALL_CORE
.  Falguni Leone MD  Division of Hospital Medicine  [facility]   Spectra: [phone]    Plan discussed with patient and Neurosurgery LIZZY Godinez.

## 2024-10-13 LAB
ANION GAP SERPL CALC-SCNC: 10 MMOL/L — SIGNIFICANT CHANGE UP (ref 5–17)
ANION GAP SERPL CALC-SCNC: 12 MMOL/L — SIGNIFICANT CHANGE UP (ref 5–17)
BUN SERPL-MCNC: 14 MG/DL — SIGNIFICANT CHANGE UP (ref 7–23)
BUN SERPL-MCNC: 17 MG/DL — SIGNIFICANT CHANGE UP (ref 7–23)
CA-I BLD-SCNC: 1.23 MMOL/L — SIGNIFICANT CHANGE UP (ref 1.15–1.33)
CALCIUM SERPL-MCNC: 8.9 MG/DL — SIGNIFICANT CHANGE UP (ref 8.4–10.5)
CALCIUM SERPL-MCNC: 9.4 MG/DL — SIGNIFICANT CHANGE UP (ref 8.4–10.5)
CHLORIDE SERPL-SCNC: 95 MMOL/L — LOW (ref 96–108)
CHLORIDE SERPL-SCNC: 96 MMOL/L — SIGNIFICANT CHANGE UP (ref 96–108)
CO2 SERPL-SCNC: 24 MMOL/L — SIGNIFICANT CHANGE UP (ref 22–31)
CO2 SERPL-SCNC: 25 MMOL/L — SIGNIFICANT CHANGE UP (ref 22–31)
CREAT SERPL-MCNC: 0.77 MG/DL — SIGNIFICANT CHANGE UP (ref 0.5–1.3)
CREAT SERPL-MCNC: 0.94 MG/DL — SIGNIFICANT CHANGE UP (ref 0.5–1.3)
EGFR: 63 ML/MIN/1.73M2 — SIGNIFICANT CHANGE UP
EGFR: 80 ML/MIN/1.73M2 — SIGNIFICANT CHANGE UP
GLUCOSE SERPL-MCNC: 104 MG/DL — HIGH (ref 70–99)
GLUCOSE SERPL-MCNC: 105 MG/DL — HIGH (ref 70–99)
HCT VFR BLD CALC: 30 % — LOW (ref 34.5–45)
HGB BLD-MCNC: 9.3 G/DL — LOW (ref 11.5–15.5)
MAGNESIUM SERPL-MCNC: 2.1 MG/DL — SIGNIFICANT CHANGE UP (ref 1.6–2.6)
MCHC RBC-ENTMCNC: 27.4 PG — SIGNIFICANT CHANGE UP (ref 27–34)
MCHC RBC-ENTMCNC: 31 GM/DL — LOW (ref 32–36)
MCV RBC AUTO: 88.5 FL — SIGNIFICANT CHANGE UP (ref 80–100)
NRBC # BLD: 0 /100 WBCS — SIGNIFICANT CHANGE UP (ref 0–0)
PHOSPHATE SERPL-MCNC: 3.4 MG/DL — SIGNIFICANT CHANGE UP (ref 2.5–4.5)
PLATELET # BLD AUTO: 548 K/UL — HIGH (ref 150–400)
POTASSIUM SERPL-MCNC: 4.3 MMOL/L — SIGNIFICANT CHANGE UP (ref 3.5–5.3)
POTASSIUM SERPL-MCNC: 4.6 MMOL/L — SIGNIFICANT CHANGE UP (ref 3.5–5.3)
POTASSIUM SERPL-SCNC: 4.3 MMOL/L — SIGNIFICANT CHANGE UP (ref 3.5–5.3)
POTASSIUM SERPL-SCNC: 4.6 MMOL/L — SIGNIFICANT CHANGE UP (ref 3.5–5.3)
RBC # BLD: 3.39 M/UL — LOW (ref 3.8–5.2)
RBC # FLD: 15.6 % — HIGH (ref 10.3–14.5)
SODIUM SERPL-SCNC: 131 MMOL/L — LOW (ref 135–145)
SODIUM SERPL-SCNC: 131 MMOL/L — LOW (ref 135–145)
TROPONIN T, HIGH SENSITIVITY RESULT: 44 NG/L — SIGNIFICANT CHANGE UP (ref 0–51)
WBC # BLD: 7.98 K/UL — SIGNIFICANT CHANGE UP (ref 3.8–10.5)
WBC # FLD AUTO: 7.98 K/UL — SIGNIFICANT CHANGE UP (ref 3.8–10.5)

## 2024-10-13 PROCEDURE — 99232 SBSQ HOSP IP/OBS MODERATE 35: CPT

## 2024-10-13 RX ORDER — SODIUM CHLORIDE 0.9 % (FLUSH) 0.9 %
1 SYRINGE (ML) INJECTION THREE TIMES A DAY
Refills: 0 | Status: DISCONTINUED | OUTPATIENT
Start: 2024-10-13 | End: 2024-10-14

## 2024-10-13 RX ORDER — CYCLOBENZAPRINE HCL 10 MG
5 TABLET ORAL THREE TIMES A DAY
Refills: 0 | Status: DISCONTINUED | OUTPATIENT
Start: 2024-10-13 | End: 2024-10-14

## 2024-10-13 RX ORDER — SODIUM CHLORIDE IRRIG SOLUTION 0.9 %
1000 SOLUTION, IRRIGATION IRRIGATION
Refills: 0 | Status: DISCONTINUED | OUTPATIENT
Start: 2024-10-13 | End: 2024-10-14

## 2024-10-13 RX ADMIN — Medication 1000 MILLIGRAM(S): at 05:55

## 2024-10-13 RX ADMIN — Medication 500 MILLIGRAM(S): at 03:05

## 2024-10-13 RX ADMIN — Medication 25 MILLIGRAM(S): at 00:56

## 2024-10-13 RX ADMIN — Medication 128 MILLIGRAM(S): at 05:20

## 2024-10-13 RX ADMIN — Medication 1 APPLICATION(S): at 13:11

## 2024-10-13 RX ADMIN — Medication 1 GRAM(S): at 21:37

## 2024-10-13 RX ADMIN — HYDROMORPHONE HYDROCHLORIDE 2 MILLIGRAM(S): 1 INJECTION, SOLUTION INTRAMUSCULAR; INTRAVENOUS; SUBCUTANEOUS at 23:39

## 2024-10-13 RX ADMIN — Medication 1000 MILLIGRAM(S): at 05:21

## 2024-10-13 RX ADMIN — ENOXAPARIN SODIUM 40 MILLIGRAM(S): 150 INJECTION SUBCUTANEOUS at 17:08

## 2024-10-13 RX ADMIN — MULTI VITAMIN/MINERAL SUPPLEMENT WITH ASCORBIC ACID, NIACIN, PYRIDOXINE, PANTOTHENIC ACID, FOLIC ACID, RIBOFLAVIN, THIAMIN, BIOTIN, COBALAMIN AND ZINC. 1 TABLET(S): 60; 20; 12.5; 10; 10; 1.7; 1.5; 1; .3; .006 TABLET, COATED ORAL at 13:11

## 2024-10-13 RX ADMIN — PREGABALIN 75 MILLIGRAM(S): 25 CAPSULE ORAL at 05:20

## 2024-10-13 RX ADMIN — ATORVASTATIN CALCIUM 40 MILLIGRAM(S): 10 TABLET, FILM COATED ORAL at 21:39

## 2024-10-13 RX ADMIN — PREGABALIN 75 MILLIGRAM(S): 25 CAPSULE ORAL at 21:39

## 2024-10-13 RX ADMIN — Medication 1 GRAM(S): at 13:06

## 2024-10-13 RX ADMIN — HYDROMORPHONE HYDROCHLORIDE 2 MILLIGRAM(S): 1 INJECTION, SOLUTION INTRAMUSCULAR; INTRAVENOUS; SUBCUTANEOUS at 02:34

## 2024-10-13 RX ADMIN — Medication 20 MILLIGRAM(S): at 13:13

## 2024-10-13 RX ADMIN — Medication 25 MILLIGRAM(S): at 05:20

## 2024-10-13 RX ADMIN — HYDROMORPHONE HYDROCHLORIDE 2 MILLIGRAM(S): 1 INJECTION, SOLUTION INTRAMUSCULAR; INTRAVENOUS; SUBCUTANEOUS at 06:33

## 2024-10-13 RX ADMIN — Medication 1000 MILLIGRAM(S): at 14:03

## 2024-10-13 RX ADMIN — Medication 1000 MILLIGRAM(S): at 13:06

## 2024-10-13 RX ADMIN — PRAMIPEXOLE DIHYDROCHLORIDE 3.75 MILLIGRAM(S): 0.5 TABLET ORAL at 21:38

## 2024-10-13 RX ADMIN — HYDROMORPHONE HYDROCHLORIDE 2 MILLIGRAM(S): 1 INJECTION, SOLUTION INTRAMUSCULAR; INTRAVENOUS; SUBCUTANEOUS at 03:25

## 2024-10-13 RX ADMIN — Medication 25 MILLIGRAM(S): at 05:21

## 2024-10-13 RX ADMIN — LOSARTAN POTASSIUM 100 MILLIGRAM(S): 100 TABLET, FILM COATED ORAL at 05:20

## 2024-10-13 RX ADMIN — PRAMIPEXOLE DIHYDROCHLORIDE 1 MILLIGRAM(S): 0.5 TABLET ORAL at 05:20

## 2024-10-13 RX ADMIN — MONTELUKAST SODIUM 10 MILLIGRAM(S): 10 TABLET, FILM COATED ORAL at 13:14

## 2024-10-13 RX ADMIN — Medication 0.4 MILLIGRAM(S): at 21:39

## 2024-10-13 RX ADMIN — Medication 1000 MILLIGRAM(S): at 21:38

## 2024-10-13 RX ADMIN — HYDROMORPHONE HYDROCHLORIDE 2 MILLIGRAM(S): 1 INJECTION, SOLUTION INTRAMUSCULAR; INTRAVENOUS; SUBCUTANEOUS at 07:10

## 2024-10-13 RX ADMIN — Medication 500 MILLILITER(S): at 17:52

## 2024-10-13 NOTE — PROGRESS NOTE ADULT - SUBJECTIVE AND OBJECTIVE BOX
Falguni Leone MD  Division of Hospital Medicine  [facility]  Spectra: [phone]      Patient is a 74y old  Female who presents with a chief complaint of Back pain 2/2 nondisplaced fracture of T12 (13 Oct 2024 08:36)      SUBJECTIVE / OVERNIGHT EVENTS: no acute events overnight. no fever, chills, n/v, nor abd pain. feels overall improved. more groggy today, falling asleep during encounter but states due to being unable to sleep well at night. still with poor PO intake of solute.  ADDITIONAL REVIEW OF SYSTEMS:    MEDICATIONS  (STANDING):  acetaminophen     Tablet .. 1000 milliGRAM(s) Oral every 8 hours  acyclovir Topical 5% Ointment 1 Application(s) Topical five times a day  atorvastatin 40 milliGRAM(s) Oral at bedtime  calamine/zinc oxide Lotion 1 Application(s) Topical daily  chlorthalidone 25 milliGRAM(s) Oral daily  DULoxetine 20 milliGRAM(s) Oral daily  enoxaparin Injectable 40 milliGRAM(s) SubCutaneous every 24 hours  ergocalciferol 38199 Unit(s) Oral every week  erythromycin    ethylsuccinate Suspension 40 mG/mL 128 milliGRAM(s) Oral every 12 hours  losartan 100 milliGRAM(s) Oral daily  metoprolol tartrate 25 milliGRAM(s) Oral two times a day  montelukast 10 milliGRAM(s) Oral daily  multivitamin 1 Tablet(s) Oral daily  polyethylene glycol 3350 17 Gram(s) Oral two times a day  pramipexole 3.75 milliGRAM(s) Oral at bedtime  pramipexole 1 milliGRAM(s) Oral <User Schedule>  pregabalin 75 milliGRAM(s) Oral three times a day  sodium chloride 1 Gram(s) Oral three times a day  tamsulosin 0.4 milliGRAM(s) Oral at bedtime  voquenza  10 mg 10 milliGRAM(s) 1 Tablet(s) Oral daily    MEDICATIONS  (PRN):  diphenhydrAMINE 25 milliGRAM(s) Oral every 8 hours PRN Rash and/or Itching  HYDROmorphone   Solution 1 milliGRAM(s) Oral every 4 hours PRN Moderate Pain (4 - 6)  HYDROmorphone   Tablet 2 milliGRAM(s) Oral every 4 hours PRN Severe Pain (7 - 10)  methocarbamol 500 milliGRAM(s) Oral every 8 hours PRN Muscle Spasm  sodium chloride 0.65% Nasal 1 Spray(s) Both Nostrils two times a day PRN Nasal Congestion      CAPILLARY BLOOD GLUCOSE        I&O's Summary    12 Oct 2024 07:01  -  13 Oct 2024 07:00  --------------------------------------------------------  IN: 0 mL / OUT: 1200 mL / NET: -1200 mL        PHYSICAL EXAM:  Vital Signs Last 24 Hrs  T(C): 36.7 (13 Oct 2024 12:26), Max: 37 (13 Oct 2024 04:40)  T(F): 98 (13 Oct 2024 12:26), Max: 98.6 (13 Oct 2024 04:40)  HR: 69 (13 Oct 2024 12:26) (69 - 81)  BP: 96/55 (13 Oct 2024 12:26) (96/55 - 138/71)  BP(mean): --  RR: 18 (13 Oct 2024 12:26) (18 - 18)  SpO2: 92% (13 Oct 2024 12:26) (92% - 96%)    Parameters below as of 13 Oct 2024 12:26  Patient On (Oxygen Delivery Method): room air      CONSTITUTIONAL: NAD, well-developed, well-groomed, more somnolent today  EYES: PERRLA; conjunctiva and sclera clear  ENMT: Moist oral mucosa, no pharyngeal injection or exudates; normal dentition  NECK: Supple, no palpable masses; no thyromegaly  RESPIRATORY: Normal respiratory effort; lungs are clear to auscultation bilaterally  CARDIOVASCULAR: Regular rate and rhythm, normal S1 and S2, no murmur/rub/gallop; No lower extremity edema  ABDOMEN: Soft, Nondistended, Nontender to palpation, normoactive bowel sounds  MUSCULOSKELETAL: No clubbing or cyanosis of digits; no joint swelling or tenderness to palpation  PSYCH: A+O to person, place, and time; affect appropriate  NEUROLOGY: CN 2-12 are intact and symmetric; no gross sensory deficits, grossly 5/5 strength throughout  SKIN: No rashes; no palpable lesions    LABS:                        9.3    7.98  )-----------( 548      ( 13 Oct 2024 12:06 )             30.0     10-13    131[L]  |  96  |  17  ----------------------------<  105[H]  4.3   |  25  |  0.94    Ca    8.9      13 Oct 2024 12:06  Phos  3.4     10-13  Mg     2.1     10-13        Urinalysis Basic - ( 13 Oct 2024 12:06 )    Color: x / Appearance: x / SG: x / pH: x  Gluc: 105 mg/dL / Ketone: x  / Bili: x / Urobili: x   Blood: x / Protein: x / Nitrite: x   Leuk Esterase: x / RBC: x / WBC x   Sq Epi: x / Non Sq Epi: x / Bacteria: x        RADIOLOGY & ADDITIONAL TESTS:  Results Reviewed: persistent hyponatremia  Imaging Personally Reviewed:  Electrocardiogram Personally Reviewed:    COORDINATION OF CARE:  Care Discussed with Consultants/Other Providers [Y]: Neurosurgery resident Jackson  Prior or Outpatient Records Reviewed [Y/N]:

## 2024-10-13 NOTE — PROGRESS NOTE ADULT - ASSESSMENT
Assessment:  74F on celecoxib for OA last took y’day otherwise no AC/AP h/o HTN, gastroparesis, peripheral neuropathy, multiple prior thoracolumbar spine surgeries for congenital scoliosis [place] >[place] w/ removal of hardware 1991 presents with back pain s/p mechanical fall from sitting y'day. Describes severe mid back pain and left hip pain, no numbness, weakness, saddle anesthesia, b/b symptoms. Pain worsened with movement, axial loading. CT TL spine w/ acute nondisplaced fracture of T12 veterbral body extending into right pedicle w/o clear cord impingement. Post-op course c/w recurrent brief Afib and anemia.      POD # 13  T9-L3 posterior instrumentation and fusion, PSO and interbody w/ plastic closure    Plan:    NEURO:    - q4h neuro/vital checks  - thoracolumbar fusion c/b CSF leak, primarily repaired, no evidence of positional HA, incision, superficial collection soft to palpation  - activity as tolerated, TLSO brace when OOB, to be refitted  - keep HOB 30 degrees for upper back collection  - drains removed 10/5  - CT T/L spine and CT Chest reviewed by Dr Stroud: hardware in good placement, b/l pleural effusions noted w/ atelectasis  - scoliosis xray pending  - chronic pain consult  - switch low dose oxycodone to low dose dilaudid PO, per NSGY, avoid somnolence  - increase lyrica to 75 TID, c/w tylenol ATC, robaxin TID made PRN  - restless leg syndrome, continue home dose Pramipaxole  - PT recs AR    CARDS:    - H/o afib and HTN, keep -160, continue chlorthadione, losartan, metoprolol  - orthostatic hypotension, midodrine weaned off  - HLD, continue atorvastatin  - TTE EF 61%    PULM:   - keep O2 sat > 92%, on room air, continue with montelukast   - incentive spirometry as tolerated  - b/l pleural effusion with atelectasis seen on CT Chest 10/10 - s/p lasix 20 x1 10/11 with good urinary response, c/w chlorthalidone    RENAL:    - IV lock   - indwelling dumont replaced 10/9 removed 10/12, h/o urinary retention, continue with tamsulosin at bedtime, passing TOV so far  - hyponatremia, continue to repeat BMP in AM    GASTRO:   - diet, regular   - last BM 10/10, c/o diarrhea, hold bowel regimen  - elevated ALK phos, trending upwards, will monitor ?medication induced; normal levels of AST/ALT/lipase  - h/o gastroparesis, continue with erythromycin ethylsuccinate Suspension 40 mG/mL every 12 hours  - h/o GERD, continue with voquenza, no evidence of GI bleed on CT A/P  - h/o incidental Liver cyst; outpt f/u with 1 month of discharge.      HEME:    - post op anemia, stable, s/p blood tranfusion intraop  - LE dopplers negative 9/30  ---> DVT prophylaxis: SCDs, SQL    ENDO:   - goal euglycemia FS   - hypophosphatemia, supplemented    ID:   - afebrile, non toxic appearing  - received post-op antibiotics    Misc:  acyclovir Topical 5% Ointment five times a day  - h/o pruritis c/w calamine/zinc oxide Lotion daily, hold off on benadryl  - h/o vit d deficiency, continue with ergocalciferol every week, multivitamin daily    Disposition:  PT/OT/PMR recommends AR    hospitalist comanagement appreciated, plan discussed with Dr. Stroud    Will discuss with Dr. Stroud  [facility]/ Spectralink: [phone]

## 2024-10-13 NOTE — PROGRESS NOTE ADULT - PROBLEM SELECTOR PLAN 2
likely 2/2 SIADH in setting of pain and poor solute intake  - pain control as per NSGY  - c/w fluid restriction 1L/day  - start salt tabs today

## 2024-10-13 NOTE — PROGRESS NOTE ADULT - SUBJECTIVE AND OBJECTIVE BOX
SUBJECTIVE:  Patient seen and examined at bedside. Patient reports slight improvement in pain with PO dilaudid. Request for her TLSO to be refitted.     OVERNIGHT EVENTS:  none    Vital Signs Last 24 Hrs  T(C): 36.5 (12 Oct 2024 05:20), Max: 36.8 (11 Oct 2024 13:02)  T(F): 97.7 (12 Oct 2024 05:20), Max: 98.2 (11 Oct 2024 13:02)  HR: 84 (12 Oct 2024 05:20) (70 - 93)  BP: 159/81 (12 Oct 2024 05:20) (139/65 - 179/74)  BP(mean): --  RR: 18 (12 Oct 2024 05:20) (18 - 18)  SpO2: 95% (12 Oct 2024 05:20) (95% - 99%)    Parameters below as of 12 Oct 2024 05:20  Patient On (Oxygen Delivery Method): room air          PHYSICAL EXAM:    Constitutional: No Acute Distress     Neurological: AOx3, Following Commands, Moving all Extremities (pain limited exam), denies HAs    Motor exam:          Upper extremity                         Delt     Bicep     Tricep    HG                                                 R         5/5        5/5        5/5       5/5                                               L          5/5        5/5        5/5       5/5          Lower extremity                        HF         KF        KE       DF         PF                                                  R        5/5        5/5        5/5       5/5         5/5                                               L         5/5        5/5       5/5       5/5          5/5                                                 Sensation: [x] intact to light touch  [] decreased:     Pulmonary: Clear to Auscultation, No rales, No rhonchi, No wheezes     Cardiovascular: S1, S2, Regular rate and rhythm     Gastrointestinal: Soft, Non-tender, Non-distended     Extremities: No calf tenderness     Incision: c/d/i, upper superficial collection, soft     LABS:                          8.8    7.91  )-----------( 478      ( 11 Oct 2024 07:28 )             27.6    10-11    134[L]  |  101  |  9   ----------------------------<  93  4.7   |  21[L]  |  0.66    Ca    8.5      11 Oct 2024 07:28  Phos  1.7     10-11  Mg     2.3     10-11    TPro  6.2  /  Alb  3.4  /  TBili  0.4  /  DBili  x   /  AST  31  /  ALT  36  /  AlkPhos  206[H]  10-11      I&O's Summary    11 Oct 2024 07:01  -  12 Oct 2024 07:00  --------------------------------------------------------  IN: 720 mL / OUT: 5150 mL / NET: -4430 mL          DRAINS: none    MEDICATIONS:  Anticoagulation:   enoxaparin Injectable 40 milliGRAM(s) SubCutaneous every 24 hours    Antibiotics:  erythromycin    ethylsuccinate Suspension 40 mG/mL 128 milliGRAM(s) Oral every 12 hours    Endo:  atorvastatin 40 milliGRAM(s) Oral at bedtime    Neuro:  acetaminophen     Tablet .. 1000 milliGRAM(s) Oral every 8 hours  DULoxetine 20 milliGRAM(s) Oral daily  HYDROmorphone   Solution 1 milliGRAM(s) Oral every 4 hours PRN Moderate Pain (4 - 6)  HYDROmorphone   Tablet 2 milliGRAM(s) Oral every 4 hours PRN Severe Pain (7 - 10)  methocarbamol 500 milliGRAM(s) Oral every 8 hours  pramipexole 3.75 milliGRAM(s) Oral at bedtime  pramipexole 1 milliGRAM(s) Oral <User Schedule>  pregabalin 75 milliGRAM(s) Oral three times a day    Cardiac:  chlorthalidone 25 milliGRAM(s) Oral daily  losartan 100 milliGRAM(s) Oral daily  metoprolol tartrate 25 milliGRAM(s) Oral two times a day  midodrine 2.5 milliGRAM(s) Oral <User Schedule>    Pulm:  montelukast 10 milliGRAM(s) Oral daily    GI/:  polyethylene glycol 3350 17 Gram(s) Oral two times a day  tamsulosin 0.4 milliGRAM(s) Oral at bedtime    Other:   acyclovir Topical 5% Ointment 1 Application(s) Topical five times a day  calamine/zinc oxide Lotion 1 Application(s) Topical daily  ergocalciferol 98554 Unit(s) Oral every week  multivitamin 1 Tablet(s) Oral daily  sodium chloride 0.65% Nasal 1 Spray(s) Both Nostrils two times a day PRN Nasal Congestion  sodium phosphate 30 milliMole(s)/500 mL IVPB 30 milliMole(s) IV Intermittent once  voquenza  10 mg 10 milliGRAM(s) 1 Tablet(s) Oral daily    DIET:  Regular    IMAGING:   Recent imaging studies were reviewed.    < from: CT Abdomen and Pelvis w/ IV Cont (10.10.24 @ 04:32) >  IMPRESSION:  Unchanged postoperative appearance of the spine with T12 fracture again   noted.    No evidence of GI bleed.    --- End of Report ---    < end of copied text >      < from: CT Chest w/ IV Cont (10.10.24 @ 04:33) >  FINDINGS:  CHEST:  LUNGS AND LARGE AIRWAYS: Patent central airways. Bilateral pleural   effusions with adjacent atelectasis.  VESSELS: Aortic calcifications. Coronary artery calcifications.  HEART: Heart size is normal. No pericardial effusion.  MEDIASTINUM AND PILAR: No lymphadenopathy. Small hiatal hernia.  CHEST WALL AND LOWER NECK: Within normal limits.    ABDOMEN AND PELVIS:  Evaluation is limited due to streak artifact.    LIVER: Thereare is a large cyst, stable since prior exam. There are   small hypodense foci on segment II and segment I too small to   characterize, unchanged since prior.  BILE DUCTS: Normal caliber.  GALLBLADDER: Within normal limits.  SPLEEN: Within normal limits.  PANCREAS: Within normal limits.  ADRENALS: Within normal limits.  KIDNEYS/URETERS: Within normal limits.    BLADDER: Limited evaluation.  REPRODUCTIVE ORGANS: Limited evaluation.    BOWEL: No bowel obstruction. Appendix is normal. No bleeding identified.  PERITONEUM/RETROPERITONEUM: Within normal limits.  VESSELS: Atherosclerotic changes.  LYMPH NODES: No lymphadenopathy.  ABDOMINAL WALL: Left abdominal wall laxity.  BONES: Right shoulder arthroplasty. Bilateral hip arthroplasties. Status   post spinal fusion of T9-L3 with bilateral rods and screws and   cylindrical cage within the superior T12 vertebral body fracture   fragments. Mild ill-defined fluid is noted overlying the surgical bed.   Resection of the left T12 pedicle and left hemilaminectomy. Degenerative   changes. Left lateral 7th through 10th rib fractures. Chronic appearing   right rib fractures. Heterogeneity of the sacrum and SI joints again   noted.    IMPRESSION:  Unchanged postoperative appearance of the spine with T12 fracture again   noted.    < end of copied text >    < from: CT Lumbar Spine No Cont (10.05.24 @ 12:24) >    IMPRESSION:    Status post spinal fusion of T9-L3 with bilateral rods and screws and   cylindrical cage within the fractured T12 vertebral body. Resection of   the LEFT T12 pedicle and a LEFT hemilaminectomy. Hardware appears intact.   No evidence of hardware loosening. Expected postoperative changes. No   evidence of paraspinal hematoma.    --- End of Report ---    < end of copied text >    < from: [facility] Duplex Lower Ext Vein Scan, Bilat (09.30.24 @ 16:57) >  IMPRESSION:  No evidence of deep venous thrombosis in either lower extremity.    < end of copied text >

## 2024-10-13 NOTE — PROGRESS NOTE ADULT - NSPROGADDITIONALINFOA_GEN_ALL_CORE
.  Falguni Leone MD  Division of Hospital Medicine  [facility]   Spectra: [phone]    Plan discussed with patient and Neurosurgery resident Jackson.

## 2024-10-14 DIAGNOSIS — I95.9 HYPOTENSION, UNSPECIFIED: ICD-10-CM

## 2024-10-14 LAB
ADD ON TEST-SPECIMEN IN LAB: SIGNIFICANT CHANGE UP
ANION GAP SERPL CALC-SCNC: 13 MMOL/L — SIGNIFICANT CHANGE UP (ref 5–17)
BUN SERPL-MCNC: 33 MG/DL — HIGH (ref 7–23)
CALCIUM SERPL-MCNC: 8.5 MG/DL — SIGNIFICANT CHANGE UP (ref 8.4–10.5)
CHLORIDE SERPL-SCNC: 99 MMOL/L — SIGNIFICANT CHANGE UP (ref 96–108)
CO2 SERPL-SCNC: 23 MMOL/L — SIGNIFICANT CHANGE UP (ref 22–31)
CREAT SERPL-MCNC: 1.14 MG/DL — SIGNIFICANT CHANGE UP (ref 0.5–1.3)
EGFR: 50 ML/MIN/1.73M2 — LOW
GLUCOSE SERPL-MCNC: 95 MG/DL — SIGNIFICANT CHANGE UP (ref 70–99)
HCT VFR BLD CALC: 32 % — LOW (ref 34.5–45)
HGB BLD-MCNC: 9.8 G/DL — LOW (ref 11.5–15.5)
MAGNESIUM SERPL-MCNC: 2.1 MG/DL — SIGNIFICANT CHANGE UP (ref 1.6–2.6)
MCHC RBC-ENTMCNC: 27.6 PG — SIGNIFICANT CHANGE UP (ref 27–34)
MCHC RBC-ENTMCNC: 30.6 GM/DL — LOW (ref 32–36)
MCV RBC AUTO: 90.1 FL — SIGNIFICANT CHANGE UP (ref 80–100)
NRBC # BLD: 0 /100 WBCS — SIGNIFICANT CHANGE UP (ref 0–0)
PHOSPHATE SERPL-MCNC: 3.4 MG/DL — SIGNIFICANT CHANGE UP (ref 2.5–4.5)
PLATELET # BLD AUTO: 610 K/UL — HIGH (ref 150–400)
POTASSIUM SERPL-MCNC: 4.5 MMOL/L — SIGNIFICANT CHANGE UP (ref 3.5–5.3)
POTASSIUM SERPL-SCNC: 4.5 MMOL/L — SIGNIFICANT CHANGE UP (ref 3.5–5.3)
RBC # BLD: 3.55 M/UL — LOW (ref 3.8–5.2)
RBC # FLD: 16.1 % — HIGH (ref 10.3–14.5)
SODIUM SERPL-SCNC: 135 MMOL/L — SIGNIFICANT CHANGE UP (ref 135–145)
WBC # BLD: 6.56 K/UL — SIGNIFICANT CHANGE UP (ref 3.8–10.5)
WBC # FLD AUTO: 6.56 K/UL — SIGNIFICANT CHANGE UP (ref 3.8–10.5)

## 2024-10-14 PROCEDURE — 71045 X-RAY EXAM CHEST 1 VIEW: CPT | Mod: 26

## 2024-10-14 PROCEDURE — 74018 RADEX ABDOMEN 1 VIEW: CPT | Mod: 26

## 2024-10-14 PROCEDURE — 99233 SBSQ HOSP IP/OBS HIGH 50: CPT

## 2024-10-14 RX ORDER — METOPROLOL TARTRATE 50 MG
25 TABLET ORAL
Refills: 0 | Status: DISCONTINUED | OUTPATIENT
Start: 2024-10-14 | End: 2024-10-17

## 2024-10-14 RX ORDER — LOSARTAN POTASSIUM 100 MG/1
50 TABLET, FILM COATED ORAL DAILY
Refills: 0 | Status: DISCONTINUED | OUTPATIENT
Start: 2024-10-14 | End: 2024-10-14

## 2024-10-14 RX ORDER — PREGABALIN 25 MG/1
100 CAPSULE ORAL THREE TIMES A DAY
Refills: 0 | Status: DISCONTINUED | OUTPATIENT
Start: 2024-10-14 | End: 2024-10-17

## 2024-10-14 RX ORDER — MIDODRINE HYDROCHLORIDE 5 MG/1
2.5 TABLET ORAL ONCE
Refills: 0 | Status: COMPLETED | OUTPATIENT
Start: 2024-10-14 | End: 2024-10-14

## 2024-10-14 RX ORDER — SODIUM CHLORIDE 0.9 % (FLUSH) 0.9 %
1 SYRINGE (ML) INJECTION THREE TIMES A DAY
Refills: 0 | Status: COMPLETED | OUTPATIENT
Start: 2024-10-14 | End: 2024-10-14

## 2024-10-14 RX ADMIN — Medication 20 MILLIGRAM(S): at 11:29

## 2024-10-14 RX ADMIN — ATORVASTATIN CALCIUM 40 MILLIGRAM(S): 10 TABLET, FILM COATED ORAL at 22:23

## 2024-10-14 RX ADMIN — ENOXAPARIN SODIUM 40 MILLIGRAM(S): 150 INJECTION SUBCUTANEOUS at 17:12

## 2024-10-14 RX ADMIN — HYDROMORPHONE HYDROCHLORIDE 1 MILLIGRAM(S): 1 INJECTION, SOLUTION INTRAMUSCULAR; INTRAVENOUS; SUBCUTANEOUS at 12:00

## 2024-10-14 RX ADMIN — Medication 25 MILLIGRAM(S): at 05:33

## 2024-10-14 RX ADMIN — Medication 1 APPLICATION(S): at 11:28

## 2024-10-14 RX ADMIN — MIDODRINE HYDROCHLORIDE 2.5 MILLIGRAM(S): 5 TABLET ORAL at 13:34

## 2024-10-14 RX ADMIN — Medication 1000 MILLIGRAM(S): at 23:20

## 2024-10-14 RX ADMIN — Medication 1000 MILLIGRAM(S): at 22:23

## 2024-10-14 RX ADMIN — HYDROMORPHONE HYDROCHLORIDE 1 MILLIGRAM(S): 1 INJECTION, SOLUTION INTRAMUSCULAR; INTRAVENOUS; SUBCUTANEOUS at 11:28

## 2024-10-14 RX ADMIN — PREGABALIN 100 MILLIGRAM(S): 25 CAPSULE ORAL at 22:23

## 2024-10-14 RX ADMIN — PREGABALIN 75 MILLIGRAM(S): 25 CAPSULE ORAL at 05:33

## 2024-10-14 RX ADMIN — Medication 1000 MILLIGRAM(S): at 13:34

## 2024-10-14 RX ADMIN — MIDODRINE HYDROCHLORIDE 2.5 MILLIGRAM(S): 5 TABLET ORAL at 14:09

## 2024-10-14 RX ADMIN — Medication 128 MILLIGRAM(S): at 17:12

## 2024-10-14 RX ADMIN — ERGOCALCIFEROL 50000 UNIT(S): 1.25 CAPSULE ORAL at 11:29

## 2024-10-14 RX ADMIN — Medication 1 GRAM(S): at 14:09

## 2024-10-14 RX ADMIN — HYDROMORPHONE HYDROCHLORIDE 1 MILLIGRAM(S): 1 INJECTION, SOLUTION INTRAMUSCULAR; INTRAVENOUS; SUBCUTANEOUS at 18:02

## 2024-10-14 RX ADMIN — Medication 128 MILLIGRAM(S): at 05:33

## 2024-10-14 RX ADMIN — HYDROMORPHONE HYDROCHLORIDE 1 MILLIGRAM(S): 1 INJECTION, SOLUTION INTRAMUSCULAR; INTRAVENOUS; SUBCUTANEOUS at 17:36

## 2024-10-14 RX ADMIN — Medication 1 GRAM(S): at 05:33

## 2024-10-14 RX ADMIN — PRAMIPEXOLE DIHYDROCHLORIDE 3.75 MILLIGRAM(S): 0.5 TABLET ORAL at 22:48

## 2024-10-14 RX ADMIN — PRAMIPEXOLE DIHYDROCHLORIDE 1 MILLIGRAM(S): 0.5 TABLET ORAL at 05:42

## 2024-10-14 RX ADMIN — Medication 1000 MILLIGRAM(S): at 05:33

## 2024-10-14 RX ADMIN — MONTELUKAST SODIUM 10 MILLIGRAM(S): 10 TABLET, FILM COATED ORAL at 11:30

## 2024-10-14 RX ADMIN — LOSARTAN POTASSIUM 100 MILLIGRAM(S): 100 TABLET, FILM COATED ORAL at 05:33

## 2024-10-14 RX ADMIN — Medication 17 GRAM(S): at 05:33

## 2024-10-14 RX ADMIN — MULTI VITAMIN/MINERAL SUPPLEMENT WITH ASCORBIC ACID, NIACIN, PYRIDOXINE, PANTOTHENIC ACID, FOLIC ACID, RIBOFLAVIN, THIAMIN, BIOTIN, COBALAMIN AND ZINC. 1 TABLET(S): 60; 20; 12.5; 10; 10; 1.7; 1.5; 1; .3; .006 TABLET, COATED ORAL at 11:29

## 2024-10-14 RX ADMIN — Medication 1 GRAM(S): at 22:24

## 2024-10-14 RX ADMIN — Medication 25 MILLIGRAM(S): at 05:35

## 2024-10-14 RX ADMIN — Medication 1000 MILLIGRAM(S): at 14:07

## 2024-10-14 RX ADMIN — PREGABALIN 75 MILLIGRAM(S): 25 CAPSULE ORAL at 13:34

## 2024-10-14 RX ADMIN — Medication 0.4 MILLIGRAM(S): at 22:23

## 2024-10-14 RX ADMIN — Medication 25 MILLIGRAM(S): at 17:12

## 2024-10-14 NOTE — PROGRESS NOTE ADULT - ASSESSMENT
Patient is a 74F w HL, OA, scoliosis s/p prior thoracolumbar spine surgeries now s/p mechanical fall w T12 fracture s/p T9-L3 fusion. + post-op AF <24h treated w IV lopressor back to sinus - had CP on 10/13/24 - no asymptomatic  - ECG with no acute ischemic changes with negative troponins  - the echo from 10/1/24 was reviewed - preserved LV function  -outpt rhythm monitor; [id number] 3 so will need AC if AFib persistent   -cardiology to sign off, please reconsult for further question

## 2024-10-14 NOTE — PROGRESS NOTE ADULT - TIME BILLING
60 minutes were spent on this encounter for extensive review of medical record details including labs and/or imaging studies and/or adjacent care team and consultant records, as well as review and reconciliation of current medications. Time was spent on obtaining a history, performing physical examination of patient, and answering patient and/or family questions regarding plan of care. Time was also spent discussing plan of care with patient’s other care team members including primary and consulting teams. Time also was spent on documentation of this encounter into the EHR.

## 2024-10-14 NOTE — PROGRESS NOTE ADULT - SUBJECTIVE AND OBJECTIVE BOX
SUBJECTIVE:  Patient seen and examined at bedside. Patient reports greatly improved pain control. Denies having any headaches, chest pain, n/v. Pt reports chest pain resolved from yesterday, likely gas related. C/o RLQ quadrant pain, found to have urinary retention s/p straight cath x1. Currently on 2L NC.      OVERNIGHT EVENTS:  hypotension    Vital Signs Last 24 Hrs  T(C): 36.7 (14 Oct 2024 09:34), Max: 37 (13 Oct 2024 21:45)  T(F): 98.1 (14 Oct 2024 09:34), Max: 98.6 (13 Oct 2024 21:45)  HR: 107 (14 Oct 2024 09:34) (68 - 107)  BP: 108/60 (14 Oct 2024 09:34) (84/60 - 120/63)  BP(mean): --  RR: 18 (14 Oct 2024 09:34) (18 - 19)  SpO2: 100% (14 Oct 2024 09:34) (92% - 100%)    Parameters below as of 14 Oct 2024 09:34  Patient On (Oxygen Delivery Method): nasal cannula        PHYSICAL EXAM:    Constitutional: No Acute Distress     Neurological: AOx3, Following Commands, Moving all Extremities (pain limited exam)    Motor exam:          Upper extremity                         Delt     Bicep     Tricep    HG                                                 R         5/5        5/5        5/5       5/5                                               L          5/5        5/5        5/5       5/5          Lower extremity                        HF         KF        KE       DF         PF                                                  R        5/5        5/5        5/5       5/5         5/5                                               L         5/5        5/5       5/5       5/5          5/5                                                 Sensation: [x] intact to light touch  [] decreased:     Pulmonary: Clear to Auscultation, No rales, No rhonchi, No wheezes     Cardiovascular: S1, S2, Regular rate and rhythm     Gastrointestinal: Soft, Non-tender, Non-distended     Extremities: No calf tenderness     Incision: c/d/i, upper superficial collection, soft     LABS:                          9.8    6.56  )-----------( 610      ( 14 Oct 2024 10:08 )             32.0   10-13    131[L]  |  96  |  17  ----------------------------<  105[H]  4.3   |  25  |  0.94    Ca    8.9      13 Oct 2024 12:06  Phos  3.4     10-14  Mg     2.1     10-14      I&O's Summary    13 Oct 2024 07:01  -  14 Oct 2024 07:00  --------------------------------------------------------  IN: 0 mL / OUT: 650 mL / NET: -650 mL    14 Oct 2024 07:01  -  14 Oct 2024 11:37  --------------------------------------------------------  IN: 0 mL / OUT: 500 mL / NET: -500 mL          DRAINS: none    MEDICATIONS  (STANDING):  acetaminophen     Tablet .. 1000 milliGRAM(s) Oral every 8 hours  acyclovir Topical 5% Ointment 1 Application(s) Topical five times a day  atorvastatin 40 milliGRAM(s) Oral at bedtime  calamine/zinc oxide Lotion 1 Application(s) Topical daily  chlorthalidone 25 milliGRAM(s) Oral daily  DULoxetine 20 milliGRAM(s) Oral daily  enoxaparin Injectable 40 milliGRAM(s) SubCutaneous every 24 hours  ergocalciferol 53942 Unit(s) Oral every week  erythromycin    ethylsuccinate Suspension 40 mG/mL 128 milliGRAM(s) Oral every 12 hours  lactated ringers. 1000 milliLiter(s) (500 mL/Hr) IV Continuous <Continuous>  metoprolol tartrate 25 milliGRAM(s) Oral two times a day  montelukast 10 milliGRAM(s) Oral daily  multivitamin 1 Tablet(s) Oral daily  polyethylene glycol 3350 17 Gram(s) Oral two times a day  pramipexole 3.75 milliGRAM(s) Oral at bedtime  pramipexole 1 milliGRAM(s) Oral <User Schedule>  pregabalin 75 milliGRAM(s) Oral three times a day  sodium chloride 1 Gram(s) Oral three times a day  tamsulosin 0.4 milliGRAM(s) Oral at bedtime  voquenza  10 mg 10 milliGRAM(s) 1 Tablet(s) Oral daily    MEDICATIONS  (PRN):  cyclobenzaprine 5 milliGRAM(s) Oral three times a day PRN Muscle Spasm  diphenhydrAMINE 25 milliGRAM(s) Oral every 8 hours PRN Rash and/or Itching  HYDROmorphone   Solution 1 milliGRAM(s) Oral every 4 hours PRN Moderate Pain (4 - 6)  HYDROmorphone   Tablet 2 milliGRAM(s) Oral every 4 hours PRN Severe Pain (7 - 10)  sodium chloride 0.65% Nasal 1 Spray(s) Both Nostrils two times a day PRN Nasal Congestion      DIET:  Regular    IMAGING:   Recent imaging studies were reviewed.    < from: CT Abdomen and Pelvis w/ IV Cont (10.10.24 @ 04:32) >  IMPRESSION:  Unchanged postoperative appearance of the spine with T12 fracture again   noted.    No evidence of GI bleed.    --- End of Report ---    < end of copied text >      < from: CT Chest w/ IV Cont (10.10.24 @ 04:33) >  FINDINGS:  CHEST:  LUNGS AND LARGE AIRWAYS: Patent central airways. Bilateral pleural   effusions with adjacent atelectasis.  VESSELS: Aortic calcifications. Coronary artery calcifications.  HEART: Heart size is normal. No pericardial effusion.  MEDIASTINUM AND PILAR: No lymphadenopathy. Small hiatal hernia.  CHEST WALL AND LOWER NECK: Within normal limits.    ABDOMEN AND PELVIS:  Evaluation is limited due to streak artifact.    LIVER: Thereare is a large cyst, stable since prior exam. There are   small hypodense foci on segment II and segment I too small to   characterize, unchanged since prior.  BILE DUCTS: Normal caliber.  GALLBLADDER: Within normal limits.  SPLEEN: Within normal limits.  PANCREAS: Within normal limits.  ADRENALS: Within normal limits.  KIDNEYS/URETERS: Within normal limits.    BLADDER: Limited evaluation.  REPRODUCTIVE ORGANS: Limited evaluation.    BOWEL: No bowel obstruction. Appendix is normal. No bleeding identified.  PERITONEUM/RETROPERITONEUM: Within normal limits.  VESSELS: Atherosclerotic changes.  LYMPH NODES: No lymphadenopathy.  ABDOMINAL WALL: Left abdominal wall laxity.  BONES: Right shoulder arthroplasty. Bilateral hip arthroplasties. Status   post spinal fusion of T9-L3 with bilateral rods and screws and   cylindrical cage within the superior T12 vertebral body fracture   fragments. Mild ill-defined fluid is noted overlying the surgical bed.   Resection of the left T12 pedicle and left hemilaminectomy. Degenerative   changes. Left lateral 7th through 10th rib fractures. Chronic appearing   right rib fractures. Heterogeneity of the sacrum and SI joints again   noted.    IMPRESSION:  Unchanged postoperative appearance of the spine with T12 fracture again   noted.    < end of copied text >    < from: CT Lumbar Spine No Cont (10.05.24 @ 12:24) >    IMPRESSION:    Status post spinal fusion of T9-L3 with bilateral rods and screws and   cylindrical cage within the fractured T12 vertebral body. Resection of   the LEFT T12 pedicle and a LEFT hemilaminectomy. Hardware appears intact.   No evidence of hardware loosening. Expected postoperative changes. No   evidence of paraspinal hematoma.    --- End of Report ---    < end of copied text >    < from: [facility] Duplex Lower Ext Vein Scan, Bilat (09.30.24 @ 16:57) >  IMPRESSION:  No evidence of deep venous thrombosis in either lower extremity.    < end of copied text >

## 2024-10-14 NOTE — PROGRESS NOTE ADULT - ASSESSMENT
75 yo F with PMH of HTN, gastroparesis, peripheral neuropathy, multiple prior thoracolumbar spine surgeries for congenital scoliosis presents with back pain s/p mechanical fall from sitting. PTA she sat down on her walker bec her legs felt fatigued and started pushing herself around but fell off curb. Found with T12 three column spine fracture-malalignment with fracture extending to the adjacent right 12th posterior rib and left T12-L1 facet joint s/p T9-L3 fusion on 9/29 c EBL 1475 cc. post-op course c/b recurrent brief Afib (back in NSR), anemia, and hyponatremia.

## 2024-10-14 NOTE — PROGRESS NOTE ADULT - SUBJECTIVE AND OBJECTIVE BOX
Patient is a 74F w HL, OA, scoliosis s/p prior thoracolumbar spine surgeries now s/p mechanical fall w T12 fracture s/p T9-L3 fusion. + post-op AF <24h treated w IV lopressor back to sinus - had CP on 10/13/24 - no asymptomatic    S/24 Events: Patient seen and examined. Denies CP, SOB, dizziness, LH, near syncope or sycope.   No acute events overnight.   Telemetry : no ectopy  O:  T(C): 36.7 (10-14-24 @ 09:34), Max: 37 (10-13-24 @ 21:45)  HR: 107 (10-14-24 @ 09:34) (68 - 107)  BP: 108/60 (10-14-24 @ 09:34) (84/60 - 120/63)  RR: 18 (10-14-24 @ 09:34) (18 - 19)  SpO2: 100% (10-14-24 @ 09:34) (92% - 100%)    Daily     Daily   Gen: NAD  HEENT: EOMI  CV: RRR, normal S1 + S2, no m/r/g  Lungs: CTAB  Abd: soft, non-tender  Ext: No edema    Labs:                        9.8    6.56  )-----------( 610      ( 14 Oct 2024 10:08 )             32.0     10-13    131[L]  |  96  |  17  ----------------------------<  105[H]  4.3   |  25  |  0.94    Ca    8.9      13 Oct 2024 12:06  Phos  3.4     10-14  Mg     2.1     10-14      Meds:  MEDICATIONS  (STANDING):  acetaminophen     Tablet .. 1000 milliGRAM(s) Oral every 8 hours  acyclovir Topical 5% Ointment 1 Application(s) Topical five times a day  atorvastatin 40 milliGRAM(s) Oral at bedtime  calamine/zinc oxide Lotion 1 Application(s) Topical daily  chlorthalidone 25 milliGRAM(s) Oral daily  DULoxetine 20 milliGRAM(s) Oral daily  enoxaparin Injectable 40 milliGRAM(s) SubCutaneous every 24 hours  ergocalciferol 41550 Unit(s) Oral every week  erythromycin    ethylsuccinate Suspension 40 mG/mL 128 milliGRAM(s) Oral every 12 hours  lactated ringers. 1000 milliLiter(s) (500 mL/Hr) IV Continuous <Continuous>  metoprolol tartrate 25 milliGRAM(s) Oral two times a day  montelukast 10 milliGRAM(s) Oral daily  multivitamin 1 Tablet(s) Oral daily  polyethylene glycol 3350 17 Gram(s) Oral two times a day  pramipexole 3.75 milliGRAM(s) Oral at bedtime  pramipexole 1 milliGRAM(s) Oral <User Schedule>  pregabalin 75 milliGRAM(s) Oral three times a day  sodium chloride 1 Gram(s) Oral three times a day  tamsulosin 0.4 milliGRAM(s) Oral at bedtime  voquenza  10 mg 10 milliGRAM(s) 1 Tablet(s) Oral daily      A/P:

## 2024-10-14 NOTE — PROGRESS NOTE ADULT - ASSESSMENT
Assessment:  74F on celecoxib for OA last took y’day otherwise no AC/AP h/o HTN, gastroparesis, peripheral neuropathy, multiple prior thoracolumbar spine surgeries for congenital scoliosis [place]/ [place] of hardware 1991 presents with back pain s/p mechanical fall from sitting y'day. Describes severe mid back pain and left hip pain, no numbness, weakness, saddle anesthesia, b/b symptoms. Pain worsened with movement, axial loading. CT TL spine w/ acute nondisplaced fracture of T12 veterbral body extending into right pedicle w/o clear cord impingement. Post-op course c/w recurrent brief Afib and anemia.      POD # 15  T9-L3 posterior instrumentation and fusion, PSO and interbody w/ plastic closure    Plan:    NEURO:    - q4h neuro/vital checks  - thoracolumbar fusion c/b CSF leak, primarily repaired, no evidence of positional HA, incision, superficial collection soft to palpation  - activity as tolerated, TLSO brace when OOB, to be refitted  - keep HOB 30 degrees for upper back collection  - drains removed 10/5  - CT T/L spine and CT Chest reviewed by Dr Stroud: hardware in good placement, b/l pleural effusions noted w/ atelectasis  - scoliosis xray done  - continue with low dose dilaudid PO, per NSGY, avoid somnolence  - continue with lyrica to 75 TID, c/w tylenol ATC, robaxin d/c because of somnolence  - restless leg syndrome, continue home dose Pramipaxole  - PT recs [facility]    CARDS:    - H/o afib and HTN, keep -160, continue chlorthadione, decrease dose of metoprolol, hold losartan  - Orthostatic / hypotension, midodrine weaned off, hold losartan for now  - Cards consulted to f/u chest pain 10/14 troponin negative, EKG without ST changes. Resolved.  - HLD, continue atorvastatin  - TTE EF 61%    PULM:   - keep O2 sat > 92%, on room air, continue with montelukast   - incentive spirometry as tolerated  - b/l pleural effusion with atelectasis seen on CT Chest 10/10 - s/p lasix 20 x1 10/11 with good urinary response, c/w chlorthalidone    RENAL:    - IV lock   - urinary retention, straight x1 10/14 for PVR 400cc. Encourage to mobilize OOB   - hyponatremia, continue to repeat BMP in AM, on 1L fluid restriction and NaCl tabs    GASTRO:   - diet, regular   - last BM 10/14, continue bowel regimen  - elevated ALK phos, trending upwards, will monitor ?medication induced; normal levels of AST/ALT/lipase  - h/o gastroparesis, continue with erythromycin ethylsuccinate Suspension 40 mG/mL every 12 hours  - h/o GERD, continue with voquenza, no evidence of GI bleed on CT A/P  - h/o incidental Liver cyst; outpt f/u with 1 month of discharge.      HEME:    - post op anemia, stable, s/p blood tranfusion intraop  - LE dopplers negative 9/30  ---> DVT prophylaxis: SCDs, SQL    ENDO:   - goal euglycemia FS   - hypophosphatemia, supplemented    ID:   - afebrile, non toxic appearing  - received post-op antibiotics    Misc:  acyclovir Topical 5% Ointment five times a day  - h/o pruritis c/w calamine/zinc oxide Lotion daily, hold off on benadryl  - h/o vit d deficiency, continue with ergocalciferol every week, multivitamin daily    Disposition:  PT/OT/PMR recommends [facility]    hospitalist borisgement appreciated, plan discussed with Dr. Stroud    Will discuss with Dr. Stroud  TEAMs/ Spectralink: [id number]

## 2024-10-14 NOTE — PROGRESS NOTE ADULT - NSPROGADDITIONALINFOA_GEN_ALL_CORE
.  Falguni Leone MD  Division of Hospital Medicine  [facility]   Spectra: [phone]    Plan discussed with patient, brother bedside, and Neurosurgery LIZZY Godinez.

## 2024-10-14 NOTE — PROGRESS NOTE ADULT - PROBLEM SELECTOR PLAN 2
- s/p 1L bolus on 10/13  - STOP losartan 100mg daily and chlorthalidone 25mg daily, last dose was 10/14 AM  - c/w metoprolol tartrate for rate control - reordered with hold parameters  - anticipate BP will remain low as she received all her anti-hypertensives today. will start to see improvement tomorrow  - can give additional 500cc bolus x1 if needed if becomes symptomatic but with caution as with mild pulm edema on personal CXR review - s/p 1L bolus on 10/13  - STOP losartan 100mg daily and chlorthalidone 25mg daily, last dose was 10/14 AM  - c/w metoprolol tartrate for rate control - reordered with hold parameters  - anticipate BP will remain low as she received all her anti-hypertensives today. will start to see improvement tomorrow  - can give x1 dose of midodrine today for symptomatic hypotension. additional 500cc bolus x1 if needed but with caution as with mild pulm edema on personal CXR review

## 2024-10-14 NOTE — PROGRESS NOTE ADULT - SUBJECTIVE AND OBJECTIVE BOX
Falguni Leone MD  Division of Hospital Medicine  [facility]  Spectra: [phone]      Patient is a 74y old  Female who presents with a chief complaint of Back pain 2/2 nondisplaced fracture of T12 (14 Oct 2024 11:34)      SUBJECTIVE / OVERNIGHT EVENTS: no acute events but did have episode of hypotension later in day yesterday s/p 1L LR. already received this AM losartan and chlorthalidone but will discontinue for now. denies any fever, chills, dizziness, chest pain, nor dyspnea. +RLQ pain improved after straight cath after found to be retaining >400cc.  ADDITIONAL REVIEW OF SYSTEMS:    MEDICATIONS  (STANDING):  acetaminophen     Tablet .. 1000 milliGRAM(s) Oral every 8 hours  acyclovir Topical 5% Ointment 1 Application(s) Topical five times a day  atorvastatin 40 milliGRAM(s) Oral at bedtime  calamine/zinc oxide Lotion 1 Application(s) Topical daily  DULoxetine 20 milliGRAM(s) Oral daily  enoxaparin Injectable 40 milliGRAM(s) SubCutaneous every 24 hours  ergocalciferol 08516 Unit(s) Oral every week  erythromycin    ethylsuccinate Suspension 40 mG/mL 128 milliGRAM(s) Oral every 12 hours  metoprolol tartrate 25 milliGRAM(s) Oral two times a day  montelukast 10 milliGRAM(s) Oral daily  multivitamin 1 Tablet(s) Oral daily  polyethylene glycol 3350 17 Gram(s) Oral two times a day  pramipexole 3.75 milliGRAM(s) Oral at bedtime  pramipexole 1 milliGRAM(s) Oral <User Schedule>  pregabalin 75 milliGRAM(s) Oral three times a day  sodium chloride 1 Gram(s) Oral three times a day  tamsulosin 0.4 milliGRAM(s) Oral at bedtime  voquenza  10 mg 10 milliGRAM(s) 1 Tablet(s) Oral daily    MEDICATIONS  (PRN):  cyclobenzaprine 5 milliGRAM(s) Oral three times a day PRN Muscle Spasm  diphenhydrAMINE 25 milliGRAM(s) Oral every 8 hours PRN Rash and/or Itching  HYDROmorphone   Solution 1 milliGRAM(s) Oral every 4 hours PRN Moderate Pain (4 - 6)  HYDROmorphone   Tablet 2 milliGRAM(s) Oral every 4 hours PRN Severe Pain (7 - 10)  sodium chloride 0.65% Nasal 1 Spray(s) Both Nostrils two times a day PRN Nasal Congestion      CAPILLARY BLOOD GLUCOSE        I&O's Summary    13 Oct 2024 07:01  -  14 Oct 2024 07:00  --------------------------------------------------------  IN: 0 mL / OUT: 650 mL / NET: -650 mL    14 Oct 2024 07:01  -  14 Oct 2024 13:16  --------------------------------------------------------  IN: 840 mL / OUT: 500 mL / NET: 340 mL        PHYSICAL EXAM:  Vital Signs Last 24 Hrs  T(C): 36.7 (14 Oct 2024 09:34), Max: 37 (13 Oct 2024 21:45)  T(F): 98.1 (14 Oct 2024 09:34), Max: 98.6 (13 Oct 2024 21:45)  HR: 83 (14 Oct 2024 12:24) (68 - 107)  BP: 90/55 (14 Oct 2024 12:24) (84/60 - 120/63)  BP(mean): --  RR: 18 (14 Oct 2024 09:34) (18 - 19)  SpO2: 97% (14 Oct 2024 12:24) (93% - 100%)    Parameters below as of 14 Oct 2024 12:24  Patient On (Oxygen Delivery Method): nasal cannula  O2 Flow (L/min): 2    CONSTITUTIONAL: NAD, well-developed, well-groomed  EYES: PERRLA; conjunctiva and sclera clear  ENMT: Moist oral mucosa, no pharyngeal injection or exudates; normal dentition  NECK: Supple, no palpable masses; no thyromegaly  RESPIRATORY: Normal respiratory effort; lungs are clear to auscultation bilaterally  CARDIOVASCULAR: Regular rate and rhythm, normal S1 and S2, no murmur/rub/gallop; No lower extremity edema  ABDOMEN: Soft, Nondistended,+tender to deep palpation of RLQ, normoactive bowel sounds  MUSCULOSKELETAL: No clubbing or cyanosis of digits; no joint swelling or tenderness to palpation  PSYCH: A+O to person, place, and time; affect appropriate  NEUROLOGY: CN 2-12 are intact and symmetric; no gross sensory deficits, grossly 5/5 strength throughout  SKIN: No rashes; no palpable lesions    LABS:                        9.8    6.56  )-----------( 610      ( 14 Oct 2024 10:08 )             32.0     10-14    135  |  99  |  33[H]  ----------------------------<  95  4.5   |  23  |  1.14    Ca    8.5      14 Oct 2024 10:08  Phos  3.4     10-14  Mg     2.1     10-14    Urinalysis Basic - ( 14 Oct 2024 10:08 )    Color: x / Appearance: x / SG: x / pH: x  Gluc: 95 mg/dL / Ketone: x  / Bili: x / Urobili: x   Blood: x / Protein: x / Nitrite: x   Leuk Esterase: x / RBC: x / WBC x   Sq Epi: x / Non Sq Epi: x / Bacteria: x      RADIOLOGY & ADDITIONAL TESTS:  Results Reviewed: hyponatremia resolved  Imaging Personally Reviewed:  10/14 CXR with mild pulm edema but overall improved compared to prior CXR, official read pending  Electrocardiogram Personally Reviewed:    COORDINATION OF CARE:  Care Discussed with Consultants/Other Providers [Y]: Neurosurgery LIZZY Godinez  Prior or Outpatient Records Reviewed [Y/N]:

## 2024-10-15 ENCOUNTER — APPOINTMENT (OUTPATIENT)
Dept: CARDIOLOGY | Facility: CLINIC | Age: 74
End: 2024-10-15

## 2024-10-15 DIAGNOSIS — G47.33 OBSTRUCTIVE SLEEP APNEA (ADULT) (PEDIATRIC): ICD-10-CM

## 2024-10-15 LAB
ADD ON TEST-SPECIMEN IN LAB: SIGNIFICANT CHANGE UP
ALBUMIN SERPL ELPH-MCNC: 3.4 G/DL — SIGNIFICANT CHANGE UP (ref 3.3–5)
ALP SERPL-CCNC: 232 U/L — HIGH (ref 40–120)
ALT FLD-CCNC: 44 U/L — SIGNIFICANT CHANGE UP (ref 10–45)
ANION GAP SERPL CALC-SCNC: 11 MMOL/L — SIGNIFICANT CHANGE UP (ref 5–17)
ANION GAP SERPL CALC-SCNC: 12 MMOL/L — SIGNIFICANT CHANGE UP (ref 5–17)
AST SERPL-CCNC: 31 U/L — SIGNIFICANT CHANGE UP (ref 10–40)
BILIRUB SERPL-MCNC: 0.3 MG/DL — SIGNIFICANT CHANGE UP (ref 0.2–1.2)
BUN SERPL-MCNC: 32 MG/DL — HIGH (ref 7–23)
BUN SERPL-MCNC: 38 MG/DL — HIGH (ref 7–23)
CALCIUM SERPL-MCNC: 8.4 MG/DL — SIGNIFICANT CHANGE UP (ref 8.4–10.5)
CALCIUM SERPL-MCNC: 9.3 MG/DL — SIGNIFICANT CHANGE UP (ref 8.4–10.5)
CHLORIDE SERPL-SCNC: 100 MMOL/L — SIGNIFICANT CHANGE UP (ref 96–108)
CHLORIDE SERPL-SCNC: 99 MMOL/L — SIGNIFICANT CHANGE UP (ref 96–108)
CO2 SERPL-SCNC: 22 MMOL/L — SIGNIFICANT CHANGE UP (ref 22–31)
CO2 SERPL-SCNC: 24 MMOL/L — SIGNIFICANT CHANGE UP (ref 22–31)
CREAT SERPL-MCNC: 0.82 MG/DL — SIGNIFICANT CHANGE UP (ref 0.5–1.3)
CREAT SERPL-MCNC: 0.93 MG/DL — SIGNIFICANT CHANGE UP (ref 0.5–1.3)
EGFR: 64 ML/MIN/1.73M2 — SIGNIFICANT CHANGE UP
EGFR: 74 ML/MIN/1.73M2 — SIGNIFICANT CHANGE UP
GLUCOSE SERPL-MCNC: 114 MG/DL — HIGH (ref 70–99)
GLUCOSE SERPL-MCNC: 96 MG/DL — SIGNIFICANT CHANGE UP (ref 70–99)
HCT VFR BLD CALC: 30.9 % — LOW (ref 34.5–45)
HGB BLD-MCNC: 9.3 G/DL — LOW (ref 11.5–15.5)
MCHC RBC-ENTMCNC: 27.4 PG — SIGNIFICANT CHANGE UP (ref 27–34)
MCHC RBC-ENTMCNC: 30.1 GM/DL — LOW (ref 32–36)
MCV RBC AUTO: 91.2 FL — SIGNIFICANT CHANGE UP (ref 80–100)
NRBC # BLD: 0 /100 WBCS — SIGNIFICANT CHANGE UP (ref 0–0)
PLATELET # BLD AUTO: 539 K/UL — HIGH (ref 150–400)
POTASSIUM SERPL-MCNC: 4.2 MMOL/L — SIGNIFICANT CHANGE UP (ref 3.5–5.3)
POTASSIUM SERPL-MCNC: 4.5 MMOL/L — SIGNIFICANT CHANGE UP (ref 3.5–5.3)
POTASSIUM SERPL-SCNC: 4.2 MMOL/L — SIGNIFICANT CHANGE UP (ref 3.5–5.3)
POTASSIUM SERPL-SCNC: 4.5 MMOL/L — SIGNIFICANT CHANGE UP (ref 3.5–5.3)
PROT SERPL-MCNC: 5.8 G/DL — LOW (ref 6–8.3)
RBC # BLD: 3.39 M/UL — LOW (ref 3.8–5.2)
RBC # FLD: 15.9 % — HIGH (ref 10.3–14.5)
SODIUM SERPL-SCNC: 134 MMOL/L — LOW (ref 135–145)
SODIUM SERPL-SCNC: 134 MMOL/L — LOW (ref 135–145)
TROPONIN T, HIGH SENSITIVITY RESULT: 33 NG/L — SIGNIFICANT CHANGE UP (ref 0–51)
WBC # BLD: 5.89 K/UL — SIGNIFICANT CHANGE UP (ref 3.8–10.5)
WBC # FLD AUTO: 5.89 K/UL — SIGNIFICANT CHANGE UP (ref 3.8–10.5)

## 2024-10-15 PROCEDURE — 71045 X-RAY EXAM CHEST 1 VIEW: CPT | Mod: 26

## 2024-10-15 PROCEDURE — 99233 SBSQ HOSP IP/OBS HIGH 50: CPT

## 2024-10-15 PROCEDURE — 93970 EXTREMITY STUDY: CPT | Mod: 26

## 2024-10-15 RX ORDER — CYCLOBENZAPRINE HCL 10 MG
5 TABLET ORAL THREE TIMES A DAY
Refills: 0 | Status: DISCONTINUED | OUTPATIENT
Start: 2024-10-15 | End: 2024-10-17

## 2024-10-15 RX ORDER — HYDROMORPHONE HYDROCHLORIDE 1 MG/ML
1 INJECTION, SOLUTION INTRAMUSCULAR; INTRAVENOUS; SUBCUTANEOUS EVERY 4 HOURS
Refills: 0 | Status: DISCONTINUED | OUTPATIENT
Start: 2024-10-15 | End: 2024-10-17

## 2024-10-15 RX ORDER — SODIUM CHLORIDE 0.9 % (FLUSH) 0.9 %
1 SYRINGE (ML) INJECTION EVERY 8 HOURS
Refills: 0 | Status: DISCONTINUED | OUTPATIENT
Start: 2024-10-15 | End: 2024-10-16

## 2024-10-15 RX ADMIN — ENOXAPARIN SODIUM 40 MILLIGRAM(S): 150 INJECTION SUBCUTANEOUS at 17:21

## 2024-10-15 RX ADMIN — ATORVASTATIN CALCIUM 40 MILLIGRAM(S): 10 TABLET, FILM COATED ORAL at 21:57

## 2024-10-15 RX ADMIN — Medication 1 GRAM(S): at 22:04

## 2024-10-15 RX ADMIN — PREGABALIN 100 MILLIGRAM(S): 25 CAPSULE ORAL at 05:09

## 2024-10-15 RX ADMIN — Medication 128 MILLIGRAM(S): at 17:22

## 2024-10-15 RX ADMIN — MONTELUKAST SODIUM 10 MILLIGRAM(S): 10 TABLET, FILM COATED ORAL at 11:26

## 2024-10-15 RX ADMIN — Medication 1000 MILLIGRAM(S): at 14:24

## 2024-10-15 RX ADMIN — Medication 1000 MILLIGRAM(S): at 15:09

## 2024-10-15 RX ADMIN — PRAMIPEXOLE DIHYDROCHLORIDE 1 MILLIGRAM(S): 0.5 TABLET ORAL at 05:10

## 2024-10-15 RX ADMIN — HYDROMORPHONE HYDROCHLORIDE 2 MILLIGRAM(S): 1 INJECTION, SOLUTION INTRAMUSCULAR; INTRAVENOUS; SUBCUTANEOUS at 00:38

## 2024-10-15 RX ADMIN — PREGABALIN 100 MILLIGRAM(S): 25 CAPSULE ORAL at 22:09

## 2024-10-15 RX ADMIN — Medication 25 MILLIGRAM(S): at 05:10

## 2024-10-15 RX ADMIN — Medication 0.4 MILLIGRAM(S): at 22:05

## 2024-10-15 RX ADMIN — HYDROMORPHONE HYDROCHLORIDE 2 MILLIGRAM(S): 1 INJECTION, SOLUTION INTRAMUSCULAR; INTRAVENOUS; SUBCUTANEOUS at 01:40

## 2024-10-15 RX ADMIN — Medication 1000 MILLIGRAM(S): at 05:10

## 2024-10-15 RX ADMIN — PREGABALIN 100 MILLIGRAM(S): 25 CAPSULE ORAL at 14:24

## 2024-10-15 RX ADMIN — Medication 25 MILLIGRAM(S): at 17:22

## 2024-10-15 RX ADMIN — Medication 1000 MILLIGRAM(S): at 06:10

## 2024-10-15 RX ADMIN — Medication 1 GRAM(S): at 14:25

## 2024-10-15 RX ADMIN — Medication 1000 MILLIGRAM(S): at 22:38

## 2024-10-15 RX ADMIN — Medication 1000 MILLIGRAM(S): at 22:08

## 2024-10-15 RX ADMIN — HYDROMORPHONE HYDROCHLORIDE 1 MILLIGRAM(S): 1 INJECTION, SOLUTION INTRAMUSCULAR; INTRAVENOUS; SUBCUTANEOUS at 16:26

## 2024-10-15 RX ADMIN — Medication 128 MILLIGRAM(S): at 06:34

## 2024-10-15 RX ADMIN — Medication 20 MILLIGRAM(S): at 11:26

## 2024-10-15 RX ADMIN — PRAMIPEXOLE DIHYDROCHLORIDE 3.75 MILLIGRAM(S): 0.5 TABLET ORAL at 22:02

## 2024-10-15 RX ADMIN — MULTI VITAMIN/MINERAL SUPPLEMENT WITH ASCORBIC ACID, NIACIN, PYRIDOXINE, PANTOTHENIC ACID, FOLIC ACID, RIBOFLAVIN, THIAMIN, BIOTIN, COBALAMIN AND ZINC. 1 TABLET(S): 60; 20; 12.5; 10; 10; 1.7; 1.5; 1; .3; .006 TABLET, COATED ORAL at 11:26

## 2024-10-15 RX ADMIN — HYDROMORPHONE HYDROCHLORIDE 1 MILLIGRAM(S): 1 INJECTION, SOLUTION INTRAMUSCULAR; INTRAVENOUS; SUBCUTANEOUS at 11:10

## 2024-10-15 NOTE — PROVIDER CONTACT NOTE (OTHER) - ASSESSMENT
12 seconds PAT heart rate 122 on tele. Pt denies chest pain, shortness of breath, lightheadedness, dizziness.
pt. assymptomatic
alert, drowsy at times, easily erousable, not c/o pain at this time

## 2024-10-15 NOTE — PROVIDER CONTACT NOTE (OTHER) - SITUATION
12 seconds PAT heart rate 122 on tele
PT has 2 RADHA drains new blood noted on sheets
pt. c/o chest pain
BP dropped to 56/43 while sitting on the chair

## 2024-10-15 NOTE — PROGRESS NOTE ADULT - SUBJECTIVE AND OBJECTIVE BOX
SUBJECTIVE:   Patient was seen and evaluated at bedside. Patient is resting in bed and is in no new acute distress.   OVERNIGHT EVENTS:   none   Vital Signs Last 24 Hrs  T(C): 36.4 (15 Oct 2024 08:26), Max: 36.9 (14 Oct 2024 21:10)  T(F): 97.5 (15 Oct 2024 08:26), Max: 98.4 (14 Oct 2024 21:10)  HR: 80 (15 Oct 2024 04:53) (76 - 87)  BP: 139/72 (15 Oct 2024 04:53) (90/55 - 139/72)  BP(mean): --  RR: 18 (15 Oct 2024 08:26) (18 - 18)  SpO2: 100% (15 Oct 2024 08:26) (96% - 100%)    Parameters below as of 15 Oct 2024 08:26  Patient On (Oxygen Delivery Method): nasal cannula        PHYSICAL EXAM:    General: No Acute Distress     Neurological: Awake, alert oriented to person, place and time, Following Commands, PERRL, EOMI, Face Symmetrical, Speech Fluent, Moving all extremities, Muscle Strength normal in all four extremities, No Drift, Sensation to Light Touch Intact    Pulmonary: Clear to Auscultation, No Rales, No Rhonchi, No Wheezes     Cardiovascular: S1, S2, Regular Rate and Rhythm     Gastrointestinal: Soft, Nontender, Nondistended     Incision: clean and dry     LABS:                        9.3    5.89  )-----------( 539      ( 15 Oct 2024 07:03 )             30.9    10-15    134[L]  |  99  |  32[H]  ----------------------------<  96  4.5   |  24  |  0.82    Ca    9.3      15 Oct 2024 07:03  Phos  3.4     10-14  Mg     2.1     10-14    TPro  5.8[L]  /  Alb  3.4  /  TBili  0.3  /  DBili  x   /  AST  31  /  ALT  44  /  AlkPhos  232[H]  10-15        10-14 @ 07:01  -  10-15 @ 07:00  --------------------------------------------------------  IN: 960 mL / OUT: 2900 mL / NET: -1940 mL    10-15 @ 07:01  -  10-15 @ 11:08  --------------------------------------------------------  IN: 480 mL / OUT: 0 mL / NET: 480 mL      DRAINS:     MEDICATIONS:  Antibiotics:  erythromycin    ethylsuccinate Suspension 40 mG/mL 128 milliGRAM(s) Oral every 12 hours    Neuro:  acetaminophen     Tablet .. 1000 milliGRAM(s) Oral every 8 hours  diphenhydrAMINE 25 milliGRAM(s) Oral every 8 hours PRN Rash and/or Itching  DULoxetine 20 milliGRAM(s) Oral daily  HYDROmorphone   Solution 1 milliGRAM(s) Oral every 4 hours PRN Moderate Pain (4 - 6)  HYDROmorphone   Tablet 2 milliGRAM(s) Oral every 4 hours PRN Severe Pain (7 - 10)  pramipexole 3.75 milliGRAM(s) Oral at bedtime  pramipexole 1 milliGRAM(s) Oral <User Schedule>  pregabalin 100 milliGRAM(s) Oral three times a day    Cardiac:  metoprolol tartrate 25 milliGRAM(s) Oral two times a day    Pulm:  montelukast 10 milliGRAM(s) Oral daily    GI/:  polyethylene glycol 3350 17 Gram(s) Oral two times a day  tamsulosin 0.4 milliGRAM(s) Oral at bedtime    Other:   acyclovir Topical 5% Ointment 1 Application(s) Topical five times a day  atorvastatin 40 milliGRAM(s) Oral at bedtime  calamine/zinc oxide Lotion 1 Application(s) Topical daily  enoxaparin Injectable 40 milliGRAM(s) SubCutaneous every 24 hours  ergocalciferol 22128 Unit(s) Oral every week  multivitamin 1 Tablet(s) Oral daily  sodium chloride 1 Gram(s) Oral every 8 hours  sodium chloride 0.65% Nasal 1 Spray(s) Both Nostrils two times a day PRN Nasal Congestion  voquenza  10 mg 10 milliGRAM(s) 1 Tablet(s) Oral daily    DIET: [] Regular [] CCD [] Renal [] Puree [] Dysphagia [] Tube Feeds:   regular   IMAGING:   ACC: [id number] EXAM:  CT CHEST IC   ORDERED BY: CORRY KRAUSE     ACC: [id number] EXAM:  CT ABDOMEN AND PELVIS IC   ORDERED BY: CORRY KRAUSE     PROCEDURE DATE:  10/10/2024          INTERPRETATION:  CLINICAL INFORMATION: Decreased hemoglobin. Question GI  bleed.    COMPARISON: CT chest 9/28/2024. CT abdomen and pelvis 9/28/2024..    CONTRAST/COMPLICATIONS:  IV Contrast: IV contrast documented in unlinked concurrent exam   (accession [id number]), Omnipaque 350 (accession [id number])  90 cc   administered  10 cc discarded  Oral Contrast: NONE  Complications: None reported at time of study completion    PROCEDURE:  CT of the Chest, Abdomen and Pelvis was performed.  Arterial, portal venous and delayed phase imaging was performed through   the upper abdomen.  Sagittal and coronal reformats were performed.    FINDINGS:  CHEST:  LUNGS AND LARGE AIRWAYS: Patent central airways. Bilateral pleural   effusions with adjacent atelectasis.  VESSELS: Aortic calcifications. Coronary artery calcifications.  HEART: Heart size is normal. No pericardial effusion.  MEDIASTINUM AND PILAR: No lymphadenopathy. Small hiatal hernia.  CHEST WALL AND LOWER NECK: Within normal limits.    ABDOMEN AND PELVIS:  Evaluation is limited due to streak artifact.    LIVER: Thereare is a large cyst, stable since prior exam. There are   small hypodense foci on segment II and segment I too small to   characterize, unchanged since prior.  BILE DUCTS: Normal caliber.  GALLBLADDER: Within normal limits.  SPLEEN: Within normal limits.  PANCREAS: Within normal limits.  ADRENALS: Within normal limits.  KIDNEYS/URETERS: Within normal limits.    BLADDER: Limited evaluation.  REPRODUCTIVE ORGANS: Limited evaluation.    BOWEL: No bowel obstruction. Appendix is normal. No bleeding identified.  PERITONEUM/RETROPERITONEUM: Within normal limits.  VESSELS: Atherosclerotic changes.  LYMPH NODES: No lymphadenopathy.  ABDOMINAL WALL: Left abdominal wall laxity.  BONES: Right shoulder arthroplasty. Bilateral hip arthroplasties. Status   post spinal fusion of T9-L3 with bilateral rods and screws and   cylindrical cage within the superior T12 vertebral body fracture   fragments. Mild ill-defined fluid is noted overlying the surgical bed.   Resection of the left T12 pedicle and left hemilaminectomy. Degenerative   changes. Left lateral 7th through 10th rib fractures. Chronic appearing   right rib fractures. Heterogeneity of the sacrum and SI joints again   noted.    IMPRESSION:  Unchanged postoperative appearance of the spine with T12 fracture again   noted.    No evidence of GI bleed.    --- End of Report ---          KILEY GRANADOS MD; Resident Radiologist  This document has been electronically signed.  KATHIA FERNANDEZ MD; Attending Radiologist  This document has been electronically signed. Oct 10 2024  9:49AM  ACC: [id number] EXAM:  CT CHEST IC   ORDERED BY: CORRY KRAUSE     ACC: [id number] EXAM:  CT ABDOMEN AND PELVIS IC   ORDERED BY: CORRY KRAUSE     PROCEDURE DATE:  10/10/2024          INTERPRETATION:  CLINICAL INFORMATION: Decreased hemoglobin. Question GI  bleed.    COMPARISON: CT chest 9/28/2024. CT abdomen and pelvis 9/28/2024..    CONTRAST/COMPLICATIONS:  IV Contrast: IV contrast documented in unlinked concurrent exam   (accession [id number]), Omnipaque 350 (accession [id number])  90 cc   administered  10 cc discarded  Oral Contrast: NONE  Complications: None reported at time of study completion    PROCEDURE:  CT of the Chest, Abdomen and Pelvis was performed.  Arterial, portal venous and delayed phase imaging was performed through   the upper abdomen.  Sagittal and coronal reformats were performed.    FINDINGS:  CHEST:  LUNGS AND LARGE AIRWAYS: Patent central airways. Bilateral pleural   effusions with adjacent atelectasis.  VESSELS: Aortic calcifications. Coronary artery calcifications.  HEART: Heart size is normal. No pericardial effusion.  MEDIASTINUM AND PILAR: No lymphadenopathy. Small hiatal hernia.  CHEST WALL AND LOWER NECK: Within normal limits.    ABDOMEN AND PELVIS:  Evaluation is limited due to streak artifact.    LIVER: Thereare is a large cyst, stable since prior exam. There are   small hypodense foci on segment II and segment I too small to   characterize, unchanged since prior.  BILE DUCTS: Normal caliber.  GALLBLADDER: Within normal limits.  SPLEEN: Within normal limits.  PANCREAS: Within normal limits.  ADRENALS: Within normal limits.  KIDNEYS/URETERS: Within normal limits.    BLADDER: Limited evaluation.  REPRODUCTIVE ORGANS: Limited evaluation.    BOWEL: No bowel obstruction. Appendix is normal. No bleeding identified.  PERITONEUM/RETROPERITONEUM: Within normal limits.  VESSELS: Atherosclerotic changes.  LYMPH NODES: No lymphadenopathy.  ABDOMINAL WALL: Left abdominal wall laxity.  BONES: Right shoulder arthroplasty. Bilateral hip arthroplasties. Status   post spinal fusion of T9-L3 with bilateral rods and screws and   cylindrical cage within the superior T12 vertebral body fracture   fragments. Mild ill-defined fluid is noted overlying the surgical bed.   Resection of the left T12 pedicle and left hemilaminectomy. Degenerative   changes. Left lateral 7th through 10th rib fractures. Chronic appearing   right rib fractures. Heterogeneity of the sacrum and SI joints again   noted.    IMPRESSION:  Unchanged postoperative appearance of the spine with T12 fracture again   noted.    No evidence of GI bleed.    --- End of Report ---          KILEY GRANADOS MD; Resident Radiologist  This document has been electronically signed.  KATHIA FERNANDEZ MD; Attending Radiologist  This document has been electronically signed. Oct 10 2024  9:49AM  ACC: [id number] EXAM:  CT THORACIC SPINE   ORDERED BY: TAYLOR IBARRA     ACC: [id number] EXAM:  CT LUMBAR SPINE   ORDERED BY: TAYLOR IBARRA     PROCEDURE DATE:  10/05/2024          INTERPRETATION:  CLINICAL INFORMATION: T12 fracture status post fusion.    COMPARISON: CT thoracic and lumbar spine 9/28/2024, CT abdomen and pelvis   9/28/2024.    CONTRAST:  IV Contrast: None  Complications: None reported at time of study completion.    TECHNIQUE:    CT SPINE: Axial images were obtained of the thoracic and lumbar spine   using multislice helical technique. Reformatted coronal and sagittal   images were obtained.        CT THORACIC AND LUMBAR SPINE:    Status post spinal fusion of T9-L3 with bilateral rods and screws and   cylindrical cage within the superior T12 vertebral body fracture   fragments. No evidence of hardware loosening. There are expected   postoperative changes. There are 2 drainage catheters entering from the   back. No evidence of paraspinal hematoma.    Comminuted distracted fracture involving the T12 vertebral body extending   to the T12-L1 facet joint and lamina are not again noted. There is been   resection of the LEFT T12 pedicle and a LEFT hemilaminectomy. There is   persistent mild anterior displacement of the inferior aspect of the T12   vertebral body with respect the endplate, similar to prior.    Decreased bone mineralization and other degenerative changes of the   spine. Osseous fusion again noted to involve the anterior and posterior   vertebral bodies extending from T10 into the sacrum likely due to an   inflammatory spinal arthropathy.    MISCELLANEOUS:  Small bilateral pleural effusions with associated   dependent atelectasis. Atherosclerotic changes of the aorta.      IMPRESSION:    Status post spinal fusion of T9-L3 with bilateral rods and screws and   cylindrical cage within the fractured T12 vertebral body. Resection of   the LEFT T12 pedicle and a LEFT hemilaminectomy. Hardware appears intact.   No evidence of hardware loosening. Expected postoperative changes. No   evidence of paraspinal hematoma.    --- End of Report ---          DIMITRY BRUNSON MD; Resident Radiologist  This document has been electronically signed.   ANGELIKA RIVERA MD; Attending Radiologist  This document has been electronically signed. Oct  5 2024  2:23PM

## 2024-10-15 NOTE — PROVIDER CONTACT NOTE (OTHER) - REASON
BP dropped to 56/43 while sitting on the chair
PT RADHA sites leaking
12 seconds PAT heart rate 122 on tele
c/o chest pain

## 2024-10-15 NOTE — PROGRESS NOTE ADULT - NSPROGADDITIONALINFOA_GEN_ALL_CORE
.  Falguni Leone MD  Division of Hospital Medicine  [facility]   Spectra: [phone]    Plan discussed with patient and Neurosurgery LIZZY Gonzalez. .  Falguni Leone MD  Division of Hospital Medicine  [facility]   Spectra: [phone]    Plan discussed with patient and Neurosurgery Attending Dr. Stroud and LIZZY Gonzalez.

## 2024-10-15 NOTE — PROGRESS NOTE ADULT - ASSESSMENT
73 yo F with PMH of HTN, gastroparesis, peripheral neuropathy, multiple prior thoracolumbar spine surgeries for congenital scoliosis presents with back pain s/p mechanical fall from sitting. PTA she sat down on her walker bec her legs felt fatigued and started pushing herself around but fell off curb. Found with T12 three column spine fracture-malalignment with fracture extending to the adjacent right 12th posterior rib and left T12-L1 facet joint s/p T9-L3 fusion on 9/29 c EBL 1475 cc. post-op course c/b recurrent brief Afib (back in NSR), anemia, and hyponatremia.

## 2024-10-15 NOTE — PROVIDER CONTACT NOTE (OTHER) - ACTION/TREATMENT ORDERED:
pt. put back in the bed, midodrine given as ordered, BP rechecked 93/56
NSCU team made aware. No changes in treatment at this time will continue to assess
EKG, stat labs
Provider notified and made aware. STAT EKG performed, normal sinus rhythm. STAT BMP, Troponin tests performed.

## 2024-10-15 NOTE — PROGRESS NOTE ADULT - PROBLEM SELECTOR PLAN 2
- s/p 1L bolus on 10/13  - STOP losartan 100mg daily and chlorthalidone 25mg daily, last dose was 10/14 AM  - required midodrine x 1 on 10/14 as had already received all her anti-hypertensives  - c/w metoprolol tartrate 25mg BID for rate control (pAF and pAT) with hold parameters  - BP improved today

## 2024-10-15 NOTE — PROVIDER CONTACT NOTE (OTHER) - BACKGROUND
PT is being monitored for CSF leak
Dx: Fracture of thoracic spine
pt. s/p T9-L3 posterior instrumentation and fusion, PSO and interbody
pt. s/ p T9-L3 open fusion

## 2024-10-15 NOTE — PROGRESS NOTE ADULT - ASSESSMENT
HPI:  74F on celecoxib for OA last took y’day otherwise no AC/AP h/o HTN, gastroparesis, peripheral neuropathy, multiple prior thoracolumbar spine surgeries for congenital scoliosis [place]/ [place] of hardware 1991 presents with back pain s/p mechanical fall from sitting y'day. Describes severe mid back pain and left hip pain, no numbness, weakness, saddle anesthesia, b/b symptoms. Pain worsened with movement, axial loading. CT TL spine w/ acute nondisplaced fracture of T12 veterbral body extending into right pedicle w/o clear cord impingement. (28 Sep 2024 15:45)    PROCEDURE: Fusion, spine, thoracolumbar, posterior approach    Muscle flap, trunk     s/p T9-L3 posterior instrumentation and fusion, PSO and interbody on 9/29         PLAN:  Neuro:   1 Out of bed   2 continue pt/ot   3 prn pain meds   4 continue lyrica   5 continue pramipexole  and cymbalta -home meds     Respiratory:   1 on 2l cannula at times   2 discussed with hospitalist - likely secondary to sleep apnea - will continuer oxygen as needed     CV:  1 Blood pressure stable   2 paroxysmal afib - on metoprolol 25 bid   3 appreciate cardiology input   4 holding other oral bp meds including diuretic due to orthostatic hypotension -will check orthostatic bp today   5 10/1 ECHO ef 61 %   Endocrine:   1 stable     Heme/Onc:             DVT ppx: sql and scds   1 Post op blood loss anemia - stable   2 LED negative on 9/30     Renal:   1 voiding   2 continue on flomax   3 iv lock     ID:   1 afebrile     GI:   1 regular diet   2 last bm 10/14   3 continue voquenza for gerd treatment      Social/Family:   Discharge planning: pending acute rehab     - 35 minutes spent for patient care and answered all questions   - will discuss with Dr. Stroud   -rainer [phone]      HPI:  74F on celecoxib for OA last took y’day otherwise no AC/AP h/o HTN, gastroparesis, peripheral neuropathy, multiple prior thoracolumbar spine surgeries for congenital scoliosis done >10 years ago [place] of state w/ removal of hardware 1991 presents with back pain s/p mechanical fall from sitting y'day. Describes severe mid back pain and left hip pain, no numbness, weakness, saddle anesthesia, b/b symptoms. Pain worsened with movement, axial loading. CT TL spine w/ acute nondisplaced fracture of T12 veterbral body extending into right pedicle w/o clear cord impingement. (28 Sep 2024 15:45)    PROCEDURE: Fusion, spine, thoracolumbar, posterior approach    Muscle flap, trunk     s/p T9-L3 posterior instrumentation and fusion, PSO and interbody on 9/29         PLAN:  Neuro:   1 Out of bed   2 continue pt/ot   3 prn pain meds   4 continue lyrica   5 continue pramipexole  and cymbalta -home meds     Respiratory:   1 on 2l cannula at times   2 discussed with hospitalist - likely secondary to sleep apnea - will continuer oxygen as needed   3 continue on singular- h/o asthma ?    CV:  1 Blood pressure stable   2 paroxysmal afib - on metoprolol 25 bid   3 appreciate cardiology input   4 holding other oral bp meds including diuretic due to orthostatic hypotension -will check orthostatic bp today   5 10/1 ECHO ef 61 %   Endocrine:   1 stable     Heme/Onc:             DVT ppx: sql and scds   1 Post op blood loss anemia - stable   2 LED negative on 9/30     Renal:   1 voiding   2 continue on flomax   3 iv lock   4 hyponatremia- started on salt tab    ID:   1 afebrile     GI:   1 regular diet   2 last bm 10/14   3 continue voquenza for gerd treatment    4 continue on eyped- h/o gastroparesis     Social/Family:   Discharge planning: pending acute rehab     - 35 minutes spent for patient care and answered all questions   - will discuss with Dr. Stroud   -rainer [phone]

## 2024-10-15 NOTE — PROGRESS NOTE ADULT - SUBJECTIVE AND OBJECTIVE BOX
Falguni Leone MD  Division of Hospital Medicine  [facility]  Spectra: [phone]      Patient is a 74y old  Female who presents with a chief complaint of Patient was admitted with unstable T12 3 column fracture after a fall. (15 Oct 2024 11:06)      SUBJECTIVE / OVERNIGHT EVENTS: run of PAT overnight. asymptomatic. no fever, chills, chest pain, nor dyspnea. has L hip pain with shooting neuropathic pain in LLE but otherwise doing okay.   ADDITIONAL REVIEW OF SYSTEMS:    MEDICATIONS  (STANDING):  acetaminophen     Tablet .. 1000 milliGRAM(s) Oral every 8 hours  acyclovir Topical 5% Ointment 1 Application(s) Topical five times a day  atorvastatin 40 milliGRAM(s) Oral at bedtime  calamine/zinc oxide Lotion 1 Application(s) Topical daily  DULoxetine 20 milliGRAM(s) Oral daily  enoxaparin Injectable 40 milliGRAM(s) SubCutaneous every 24 hours  ergocalciferol 92159 Unit(s) Oral every week  erythromycin    ethylsuccinate Suspension 40 mG/mL 128 milliGRAM(s) Oral every 12 hours  metoprolol tartrate 25 milliGRAM(s) Oral two times a day  montelukast 10 milliGRAM(s) Oral daily  multivitamin 1 Tablet(s) Oral daily  polyethylene glycol 3350 17 Gram(s) Oral two times a day  pramipexole 3.75 milliGRAM(s) Oral at bedtime  pramipexole 1 milliGRAM(s) Oral <User Schedule>  pregabalin 100 milliGRAM(s) Oral three times a day  sodium chloride 1 Gram(s) Oral every 8 hours  tamsulosin 0.4 milliGRAM(s) Oral at bedtime  voquenza  10 mg 10 milliGRAM(s) 1 Tablet(s) Oral daily    MEDICATIONS  (PRN):  cyclobenzaprine 5 milliGRAM(s) Oral three times a day PRN Muscle Spasm  diphenhydrAMINE 25 milliGRAM(s) Oral every 8 hours PRN Rash and/or Itching  HYDROmorphone   Solution 1 milliGRAM(s) Oral every 4 hours PRN Severe Pain (7 - 10)  sodium chloride 0.65% Nasal 1 Spray(s) Both Nostrils two times a day PRN Nasal Congestion      CAPILLARY BLOOD GLUCOSE        I&O's Summary    14 Oct 2024 07:01  -  15 Oct 2024 07:00  --------------------------------------------------------  IN: 960 mL / OUT: 2900 mL / NET: -1940 mL    15 Oct 2024 07:01  -  15 Oct 2024 13:09  --------------------------------------------------------  IN: 480 mL / OUT: 0 mL / NET: 480 mL        PHYSICAL EXAM:  Vital Signs Last 24 Hrs  T(C): 36.4 (15 Oct 2024 08:26), Max: 36.9 (14 Oct 2024 21:10)  T(F): 97.5 (15 Oct 2024 08:26), Max: 98.4 (14 Oct 2024 21:10)  HR: 86 (15 Oct 2024 12:00) (76 - 87)  BP: 135/74 (15 Oct 2024 12:00) (93/55 - 139/72)  BP(mean): --  RR: 18 (15 Oct 2024 12:00) (18 - 18)  SpO2: 98% (15 Oct 2024 12:00) (96% - 100%)    Parameters below as of 15 Oct 2024 12:00  Patient On (Oxygen Delivery Method): nasal cannula  O2 Flow (L/min): 2    CONSTITUTIONAL: NAD, well-developed, well-groomed  EYES: PERRLA; conjunctiva and sclera clear  ENMT: Moist oral mucosa, no pharyngeal injection or exudates; normal dentition  NECK: Supple, no palpable masses; no thyromegaly  RESPIRATORY: Normal respiratory effort; lungs are clear to auscultation bilaterally, no wheeze nor crackles  CARDIOVASCULAR: Regular rate and rhythm, normal S1 and S2, no murmur/rub/gallop; No lower extremity edema  ABDOMEN: Soft, Nondistended, Nontender to palpation; normoactive bowel sounds  MUSCULOSKELETAL: No clubbing or cyanosis of digits; no joint swelling or tenderness to palpation  PSYCH: A+O to person, place, and time; affect appropriate  NEUROLOGY: CN 2-12 are intact and symmetric; no gross sensory deficits, grossly 5/5 strength throughout  SKIN: No rashes; no palpable lesions    LABS:                        9.3    5.89  )-----------( 539      ( 15 Oct 2024 07:03 )             30.9     10-15    134[L]  |  99  |  32[H]  ----------------------------<  96  4.5   |  24  |  0.82    Ca    9.3      15 Oct 2024 07:03  Phos  3.4     10-14  Mg     2.1     10-14    TPro  5.8[L]  /  Alb  3.4  /  TBili  0.3  /  DBili  x   /  AST  31  /  ALT  44  /  AlkPhos  232[H]  10-15        Urinalysis Basic - ( 15 Oct 2024 07:03 )    Color: x / Appearance: x / SG: x / pH: x  Gluc: 96 mg/dL / Ketone: x  / Bili: x / Urobili: x   Blood: x / Protein: x / Nitrite: x   Leuk Esterase: x / RBC: x / WBC x   Sq Epi: x / Non Sq Epi: x / Bacteria: x        RADIOLOGY & ADDITIONAL TESTS:  Results Reviewed: no leukocytosis, H/H stable, Na slightly downtrended  Imaging Personally Reviewed:  Electrocardiogram Personally Reviewed:    COORDINATION OF CARE:  Care Discussed with Consultants/Other Providers [Y]: Neurosurgery Attending Dr. Stroud and LIZZY Gonzalez  Prior or Outpatient Records Reviewed [Y/N]:

## 2024-10-15 NOTE — PROGRESS NOTE ADULT - PROBLEM SELECTOR PLAN 7
seen on CT Chest on 10/10  - now s/p one dose of Lasix with significant output  - euvolemic on exam  - repeat CXR 10/15 with resolution of pleural effusions seen on CT Chest on 10/10  - now s/p one dose of Lasix with significant output  - euvolemic on exam  - repeat CXR 10/15 with resolution of pleural effusions  - will add on pro-BNP to today's labs

## 2024-10-16 ENCOUNTER — TRANSCRIPTION ENCOUNTER (OUTPATIENT)
Age: 74
End: 2024-10-16

## 2024-10-16 LAB
ANION GAP SERPL CALC-SCNC: 13 MMOL/L — SIGNIFICANT CHANGE UP (ref 5–17)
BUN SERPL-MCNC: 25 MG/DL — HIGH (ref 7–23)
CALCIUM SERPL-MCNC: 9.5 MG/DL — SIGNIFICANT CHANGE UP (ref 8.4–10.5)
CHLORIDE SERPL-SCNC: 99 MMOL/L — SIGNIFICANT CHANGE UP (ref 96–108)
CO2 SERPL-SCNC: 26 MMOL/L — SIGNIFICANT CHANGE UP (ref 22–31)
CREAT SERPL-MCNC: 0.8 MG/DL — SIGNIFICANT CHANGE UP (ref 0.5–1.3)
EGFR: 76 ML/MIN/1.73M2 — SIGNIFICANT CHANGE UP
GLUCOSE SERPL-MCNC: 109 MG/DL — HIGH (ref 70–99)
HCT VFR BLD CALC: 30.7 % — LOW (ref 34.5–45)
HGB BLD-MCNC: 9.5 G/DL — LOW (ref 11.5–15.5)
MCHC RBC-ENTMCNC: 27.9 PG — SIGNIFICANT CHANGE UP (ref 27–34)
MCHC RBC-ENTMCNC: 30.9 GM/DL — LOW (ref 32–36)
MCV RBC AUTO: 90 FL — SIGNIFICANT CHANGE UP (ref 80–100)
NRBC # BLD: 0 /100 WBCS — SIGNIFICANT CHANGE UP (ref 0–0)
PLATELET # BLD AUTO: 547 K/UL — HIGH (ref 150–400)
POTASSIUM SERPL-MCNC: 4.4 MMOL/L — SIGNIFICANT CHANGE UP (ref 3.5–5.3)
POTASSIUM SERPL-SCNC: 4.4 MMOL/L — SIGNIFICANT CHANGE UP (ref 3.5–5.3)
RBC # BLD: 3.41 M/UL — LOW (ref 3.8–5.2)
RBC # FLD: 15.5 % — HIGH (ref 10.3–14.5)
SODIUM SERPL-SCNC: 138 MMOL/L — SIGNIFICANT CHANGE UP (ref 135–145)
WBC # BLD: 6.11 K/UL — SIGNIFICANT CHANGE UP (ref 3.8–10.5)
WBC # FLD AUTO: 6.11 K/UL — SIGNIFICANT CHANGE UP (ref 3.8–10.5)

## 2024-10-16 PROCEDURE — 99232 SBSQ HOSP IP/OBS MODERATE 35: CPT

## 2024-10-16 RX ORDER — ERYTHROMYCIN STEARATE 250 MG
128 TABLET ORAL EVERY 12 HOURS
Refills: 0 | Status: DISCONTINUED | OUTPATIENT
Start: 2024-10-17 | End: 2024-10-23

## 2024-10-16 RX ORDER — PRAMIPEXOLE DIHYDROCHLORIDE 0.5 MG/1
5 TABLET ORAL
Qty: 0 | Refills: 0 | DISCHARGE
Start: 2024-10-16

## 2024-10-16 RX ORDER — SENNOSIDES 8.6 MG
1 TABLET ORAL
Refills: 0 | DISCHARGE

## 2024-10-16 RX ORDER — PRAMIPEXOLE DIHYDROCHLORIDE 0.5 MG/1
1 TABLET ORAL
Refills: 0 | DISCHARGE

## 2024-10-16 RX ORDER — CYCLOBENZAPRINE HCL 10 MG
1 TABLET ORAL
Qty: 0 | Refills: 0 | DISCHARGE
Start: 2024-10-16

## 2024-10-16 RX ORDER — PREGABALIN 150 MG/1
100 CAPSULE ORAL THREE TIMES A DAY
Refills: 0 | Status: DISCONTINUED | OUTPATIENT
Start: 2024-10-17 | End: 2024-10-21

## 2024-10-16 RX ORDER — PRAMIPEXOLE DIHYDROCHLORIDE 0.12 MG/1
3.75 TABLET ORAL AT BEDTIME
Refills: 0 | Status: DISCONTINUED | OUTPATIENT
Start: 2024-10-17 | End: 2024-10-23

## 2024-10-16 RX ORDER — DULOXETINE HYDROCHLORIDE 30 MG/1
20 CAPSULE, DELAYED RELEASE ORAL DAILY
Refills: 0 | Status: DISCONTINUED | OUTPATIENT
Start: 2024-10-18 | End: 2024-10-23

## 2024-10-16 RX ORDER — TAMSULOSIN HCL 0.4 MG
0.4 CAPSULE ORAL AT BEDTIME
Refills: 0 | Status: DISCONTINUED | OUTPATIENT
Start: 2024-10-17 | End: 2024-10-23

## 2024-10-16 RX ORDER — PRAMIPEXOLE DIHYDROCHLORIDE 0.5 MG/1
1 TABLET ORAL
Qty: 0 | Refills: 0 | DISCHARGE
Start: 2024-10-16

## 2024-10-16 RX ORDER — PRAMIPEXOLE DIHYDROCHLORIDE 0.12 MG/1
1 TABLET ORAL
Refills: 0 | Status: DISCONTINUED | OUTPATIENT
Start: 2024-10-18 | End: 2024-10-23

## 2024-10-16 RX ORDER — ENOXAPARIN SODIUM 40MG/0.4ML
40 SYRINGE (ML) SUBCUTANEOUS EVERY 24 HOURS
Refills: 0 | Status: DISCONTINUED | OUTPATIENT
Start: 2024-10-17 | End: 2024-10-23

## 2024-10-16 RX ORDER — DIPHENHYDRAMINE HCL 12.5MG/5ML
25 ELIXIR ORAL EVERY 8 HOURS
Refills: 0 | Status: DISCONTINUED | OUTPATIENT
Start: 2024-10-17 | End: 2024-10-23

## 2024-10-16 RX ORDER — MONTELUKAST SODIUM 10 MG/1
1 TABLET, FILM COATED ORAL
Refills: 0 | DISCHARGE

## 2024-10-16 RX ORDER — METOPROLOL TARTRATE 50 MG
1 TABLET ORAL
Refills: 0 | DISCHARGE

## 2024-10-16 RX ORDER — MONTELUKAST SODIUM 10 MG/1
1 TABLET, FILM COATED ORAL
Qty: 0 | Refills: 0 | DISCHARGE
Start: 2024-10-16

## 2024-10-16 RX ORDER — ENOXAPARIN SODIUM 150 MG/ML
40 INJECTION SUBCUTANEOUS
Qty: 0 | Refills: 0 | DISCHARGE
Start: 2024-10-16

## 2024-10-16 RX ORDER — SENNOSIDES 8.6 MG
0 TABLET ORAL
Refills: 0 | DISCHARGE

## 2024-10-16 RX ORDER — ERGOCALCIFEROL (VITAMIN D2) 200 MCG/ML
50000 DROPS ORAL
Refills: 0 | Status: DISCONTINUED | OUTPATIENT
Start: 2024-10-21 | End: 2024-10-23

## 2024-10-16 RX ORDER — B-COMPLEX WITH VITAMIN C
1 VIAL (ML) INJECTION DAILY
Refills: 0 | Status: DISCONTINUED | OUTPATIENT
Start: 2024-10-18 | End: 2024-10-23

## 2024-10-16 RX ORDER — SENNA 187 MG
2 TABLET ORAL AT BEDTIME
Refills: 0 | Status: DISCONTINUED | OUTPATIENT
Start: 2024-10-17 | End: 2024-10-23

## 2024-10-16 RX ORDER — CALCIUM CARBONATE 215(500)MG
1 TABLET,CHEWABLE ORAL
Refills: 0 | Status: DISCONTINUED | OUTPATIENT
Start: 2024-10-17 | End: 2024-10-23

## 2024-10-16 RX ORDER — METOPROLOL TARTRATE 50 MG
0.5 TABLET ORAL
Qty: 0 | Refills: 0 | DISCHARGE
Start: 2024-10-16

## 2024-10-16 RX ORDER — ACETAMINOPHEN 500 MG
975 TABLET ORAL EVERY 8 HOURS
Refills: 0 | Status: DISCONTINUED | OUTPATIENT
Start: 2024-10-17 | End: 2024-10-20

## 2024-10-16 RX ORDER — CYCLOBENZAPRINE HYDROCHLORIDE 30 MG/1
5 CAPSULE, EXTENDED RELEASE ORAL THREE TIMES A DAY
Refills: 0 | Status: DISCONTINUED | OUTPATIENT
Start: 2024-10-17 | End: 2024-10-23

## 2024-10-16 RX ORDER — MELATONIN 5 MG
6 TABLET ORAL AT BEDTIME
Refills: 0 | Status: DISCONTINUED | OUTPATIENT
Start: 2024-10-17 | End: 2024-10-23

## 2024-10-16 RX ORDER — MONTELUKAST SODIUM 10 MG/1
10 TABLET, FILM COATED ORAL DAILY
Refills: 0 | Status: DISCONTINUED | OUTPATIENT
Start: 2024-10-18 | End: 2024-10-23

## 2024-10-16 RX ORDER — ATORVASTATIN CALCIUM 10 MG/1
1 TABLET, FILM COATED ORAL
Qty: 0 | Refills: 0 | DISCHARGE
Start: 2024-10-16

## 2024-10-16 RX ORDER — HYDROMORPHONE HYDROCHLORIDE 1 MG/ML
1 INJECTION, SOLUTION INTRAMUSCULAR; INTRAVENOUS; SUBCUTANEOUS
Qty: 0 | Refills: 0 | DISCHARGE
Start: 2024-10-16

## 2024-10-16 RX ORDER — DULOXETINE HCL 20 MG
1 CAPSULE,DELAYED RELEASE (ENTERIC COATED) ORAL
Qty: 0 | Refills: 0 | DISCHARGE
Start: 2024-10-16

## 2024-10-16 RX ORDER — MULTI VITAMIN/MINERAL SUPPLEMENT WITH ASCORBIC ACID, NIACIN, PYRIDOXINE, PANTOTHENIC ACID, FOLIC ACID, RIBOFLAVIN, THIAMIN, BIOTIN, COBALAMIN AND ZINC. 60; 20; 12.5; 10; 10; 1.7; 1.5; 1; .3; .006 MG/1; MG/1; MG/1; MG/1; MG/1; MG/1; MG/1; MG/1; MG/1; MG/1
1 TABLET, COATED ORAL
Qty: 0 | Refills: 0 | DISCHARGE
Start: 2024-10-16

## 2024-10-16 RX ORDER — PREGABALIN 25 MG/1
1 CAPSULE ORAL
Qty: 0 | Refills: 0 | DISCHARGE
Start: 2024-10-16

## 2024-10-16 RX ORDER — CALAMINE
1 LOTION (ML) TOPICAL DAILY
Refills: 0 | Status: DISCONTINUED | OUTPATIENT
Start: 2024-10-17 | End: 2024-10-23

## 2024-10-16 RX ORDER — POLYETHYLENE GLYCOL 3350 17 G/17G
17 POWDER, FOR SOLUTION ORAL
Refills: 0 | Status: DISCONTINUED | OUTPATIENT
Start: 2024-10-17 | End: 2024-10-23

## 2024-10-16 RX ORDER — ACETAMINOPHEN 325 MG
1 TABLET ORAL
Qty: 0 | Refills: 0 | DISCHARGE
Start: 2024-10-16

## 2024-10-16 RX ORDER — TAMSULOSIN HCL 0.4 MG
1 CAPSULE ORAL
Qty: 0 | Refills: 0 | DISCHARGE
Start: 2024-10-16

## 2024-10-16 RX ADMIN — PRAMIPEXOLE DIHYDROCHLORIDE 1 MILLIGRAM(S): 0.5 TABLET ORAL at 06:31

## 2024-10-16 RX ADMIN — Medication 1 GRAM(S): at 06:44

## 2024-10-16 RX ADMIN — Medication 1000 MILLIGRAM(S): at 13:12

## 2024-10-16 RX ADMIN — Medication 25 MILLIGRAM(S): at 22:40

## 2024-10-16 RX ADMIN — MONTELUKAST SODIUM 10 MILLIGRAM(S): 10 TABLET, FILM COATED ORAL at 13:12

## 2024-10-16 RX ADMIN — Medication 5 MILLIGRAM(S): at 00:44

## 2024-10-16 RX ADMIN — Medication 1000 MILLIGRAM(S): at 06:36

## 2024-10-16 RX ADMIN — Medication 0.4 MILLIGRAM(S): at 21:46

## 2024-10-16 RX ADMIN — Medication 25 MILLIGRAM(S): at 06:32

## 2024-10-16 RX ADMIN — Medication 25 MILLIGRAM(S): at 17:46

## 2024-10-16 RX ADMIN — Medication 1 APPLICATION(S): at 15:52

## 2024-10-16 RX ADMIN — Medication 17 GRAM(S): at 06:30

## 2024-10-16 RX ADMIN — Medication 1000 MILLIGRAM(S): at 07:06

## 2024-10-16 RX ADMIN — HYDROMORPHONE HYDROCHLORIDE 1 MILLIGRAM(S): 1 INJECTION, SOLUTION INTRAMUSCULAR; INTRAVENOUS; SUBCUTANEOUS at 08:31

## 2024-10-16 RX ADMIN — Medication 128 MILLIGRAM(S): at 06:46

## 2024-10-16 RX ADMIN — PRAMIPEXOLE DIHYDROCHLORIDE 3.75 MILLIGRAM(S): 0.5 TABLET ORAL at 21:46

## 2024-10-16 RX ADMIN — HYDROMORPHONE HYDROCHLORIDE 1 MILLIGRAM(S): 1 INJECTION, SOLUTION INTRAMUSCULAR; INTRAVENOUS; SUBCUTANEOUS at 15:51

## 2024-10-16 RX ADMIN — PREGABALIN 100 MILLIGRAM(S): 25 CAPSULE ORAL at 13:12

## 2024-10-16 RX ADMIN — Medication 1000 MILLIGRAM(S): at 21:47

## 2024-10-16 RX ADMIN — ATORVASTATIN CALCIUM 40 MILLIGRAM(S): 10 TABLET, FILM COATED ORAL at 21:46

## 2024-10-16 RX ADMIN — MULTI VITAMIN/MINERAL SUPPLEMENT WITH ASCORBIC ACID, NIACIN, PYRIDOXINE, PANTOTHENIC ACID, FOLIC ACID, RIBOFLAVIN, THIAMIN, BIOTIN, COBALAMIN AND ZINC. 1 TABLET(S): 60; 20; 12.5; 10; 10; 1.7; 1.5; 1; .3; .006 TABLET, COATED ORAL at 13:12

## 2024-10-16 RX ADMIN — PREGABALIN 100 MILLIGRAM(S): 25 CAPSULE ORAL at 06:44

## 2024-10-16 RX ADMIN — Medication 5 MILLIGRAM(S): at 17:47

## 2024-10-16 RX ADMIN — Medication 20 MILLIGRAM(S): at 15:51

## 2024-10-16 RX ADMIN — Medication 25 MILLIGRAM(S): at 02:38

## 2024-10-16 RX ADMIN — HYDROMORPHONE HYDROCHLORIDE 1 MILLIGRAM(S): 1 INJECTION, SOLUTION INTRAMUSCULAR; INTRAVENOUS; SUBCUTANEOUS at 09:29

## 2024-10-16 RX ADMIN — PREGABALIN 100 MILLIGRAM(S): 25 CAPSULE ORAL at 21:47

## 2024-10-16 NOTE — PROGRESS NOTE ADULT - PROBLEM SELECTOR PLAN 10
DVT ppx: lovenox  repeat duplex 10/15 negative for DVT    Dispo: Acute Rehab
DVT ppx: lovenox  repeat duplex 10/15 to r/o DVT    Dispo: Acute Rehab

## 2024-10-16 NOTE — PROGRESS NOTE ADULT - PROBLEM SELECTOR PROBLEM 9
Elevated alkaline phosphatase level
Prophylactic measure
Elevated alkaline phosphatase level
Prophylactic measure
Prophylactic measure

## 2024-10-16 NOTE — PROGRESS NOTE ADULT - PROBLEM SELECTOR PROBLEM 6
Liver cyst
Pleural effusion
SILVANA (obstructive sleep apnea)
SILVANA (obstructive sleep apnea)
Liver cyst
Hypophosphatemia

## 2024-10-16 NOTE — DISCHARGE NOTE NURSING/CASE MANAGEMENT/SOCIAL WORK - FINANCIAL ASSISTANCE
[facility] provides services at a reduced cost to those who are determined to be eligible through [facility]’s financial assistance program. Information regarding [facility]’s financial assistance program can be found by going to [email or web address] or by calling [phone].

## 2024-10-16 NOTE — DISCHARGE NOTE NURSING/CASE MANAGEMENT/SOCIAL WORK - NSDCPEFALRISK_GEN_ALL_CORE
For information on Fall & Injury Prevention, visit: [email or web address] OR  [email or web address] OR  https://www.cdc.gov/steadi/patient.html

## 2024-10-16 NOTE — PROGRESS NOTE ADULT - PROBLEM SELECTOR PLAN 5
- now back in NSR  - TTE EF 61%  - c/w metoprolol 25mg BID  - Cards consult recs appreciated  - if afib to recur, would need full dose a/c as per Cards  - will need outpatient rhythm monitor
outpt f/u with 1 month of discharge
outpt f/u with 1 month of discharge
s/p one dose of Lasix with significant output  - euvolemic on exam  - re-check CXR on Mon 10/14
- now back in NSR  - TTE EF 61%  - c/w metoprolol 25mg BID  - Cards consult recs appreciated  - if afib to recur, would need full dose a/c as per Cards  - will need outpatient rhythm monitor
- now back in NSR  - TTE EF 61%  - c/w metoprolol 25mg BID  - Cards consult recs appreciated  - if afib to recur, would need full dose a/c as per Cards  - will need outpatient rhythm monitor
s/p one dose of Lasix with significant output  - euvolemic on exam  - re-check CXR on Mon 10/14

## 2024-10-16 NOTE — H&P ADULT - ASSESSMENT
Assessment/Plan:  VIRGINIA HUFFMAN is a 74 F with PMHx of OA, HTN, gastroparesis, peripheral neuropathy, multiple prior thoracolumbar spine surgeries for congenital scoliosis [place] >[place] w/ removal of hardware (1991) presents to [facility] on 9/28/24  with back pain and left hip pain s/p mechanical fall. Imaging shows acute nondisplaced fracture of T12 veterbral body extending into right pedicle w/o clear cord impingement. S/p T9-L3 open fusion with PSO/lag screw partial corpectomy/interbody with complex plastic closure ( w/ Dr. Stroud and Dr. Tinajero on 9/29).  Post op course c/b CSK leak s/p repair, new onset  paroxsymal Afib with RVR,  increased pain and drainage from surgical incision, development of pleural effusions, symptomatic orthostatic hypotension, urinary retention,  post op anemia, and chest pain with spontaneous resolution. Cardiac work up negative. Patient is now optimized and admitted to [facility] after for initiation of a multidisciplinary rehab program consisting focused on functional mobility, transfers and ADLs.    #T12 vertebral fracture   - S/p T9-L3 open fusion with PSO/lag screw partial corpectomy/interbody with complex plastic closure ( w/ Dr. Stroud and Dr. Tinajero on 9/29)   -9/28 CT Lumbar spine- T12 three column spine fracture-malalignment with fracture extending to the adjacent right 12th posterior rib and left T12-L1 facet joint. and acute, displaced fracture of the left eighth lateral rib.  -10/5  CT Lumbar spine- Status post spinal fusion of T9-L3 with bilateral rods and screws and cylindrical cage within the fractured T12 vertebral body. Resection of the LEFT T12 pedicle and a LEFT hemilaminectomy. Hardware appears intact.   No evidence of hardware loosening. Expected postoperative changes.   -Spinal/fall precautions  -TLSO when OOB  -ICE PRN  -Pain management: Tylenol 975mg ATC, Duloxetine 20mg daily, Lyrica 100mg TID , Flexeril 5mg TID PRN      Comprehensive Multidisciplinary Rehab Program:  - Start comprehensive rehab program, 3 hours a day, 5 days a week.  - PT 2hr/day: Focused on improving strength, endurance, coordination, balance, functional mobility, and transfers  - OT 1hr/day: Focused on improving strength, fine motor skills, coordination, posture and ADLs.    - Activity Limitations: Decreased social, vocational and leisure activities, decreased self care and ADLs, decreased mobility, decreased ability to manage household and finances.     -----------------------------------------------------------------------------------  Concurrent Medical Problems    #Orthostatic hypotension  -Monitor daily  -S/p 1L IVF bolus (10/13) for symptomatic hypotension  -Losartan 100mg daily HOLD  -Chlorthalidone 25mg daily HOLD (may restart if SBP is consistently above 140 per cardio)  -S/p Midodrine ?    #Seasonal allergies  -Singulair 10mg daily     #HLD  -Atorvastatin 40mg HS    #Paroxysmal A-Fib  -TTE 61%  -S/p IV Lopressor x 1   -Metoprolol 25mg BID for rate control  -Monitor, if continue to be in A-fib, will need full dose AC per cardio    #Hyponatremia (resolved)  - Likely 2/2 SIADH in setting of pain and poor solute intake  - Liberalize fluid restriction in setting of soft BP  - Salt tabs 1g TID today  - Monitor Na (10/16 Na 138)    #Post op Anemia.   - S/p PRBCs intraoperatively   - Hb stable with no evidence of active bleed at this time.   - Monitor H/H (10/16 Hgb 9.5)    #SILVANA (obstructive sleep apnea).   · Trialed on RA bedside. when awake and speaking with me, maintains SpO2>92%, however several time she dozed off    during exam during which she would have transient desaturations.  - Nocturnal 2L O2 and PRN during day during naps  - F/u with PCP outpatient as may benefit from sleep study and CPAP.    #Pleural effusion   - 10/10 CT Abd/pelvis- neg for GI bleed  - S/p one dose of Lasix with significant output  - repeat CXR 10/15 with resolution of pleural effusions    #Liver cyst  -10/10 CT Abd/pelvis- Thereare is a large cyst, stable since prior exam. There are small hypodense foci on segment II and segment I too small to characterize, unchanged since prior.  -Outpatient f/u     #Restless Leg Syndrome  -Pramipexole 1mg daily/ 3.75mg HS    #Elevated alkaline phosphatase level (increasing)  -Outpatient f/u with PCP (10/16 Alk Phos 232)    #Mood/Cognition/Depression  Neuropsychology consult PRN  -Duloxetine 20mg daily    #Sleep:   Maintain quiet hours and low stim environment.  Melatonin 6mg HS to maximize participation in therapy during the day.     #GI/Bowel:  Senna QHS, Miralax PRN Daily  GI ppx: Pantoprazole 40mg daily, Voquenza 10mg daily     #/Bladder/Urinary retention  - PVR q 8 hours (SC if > 400)  -Flomax 0.4mg HS     #Skin/Pressure Injury:   - Skin assessment on admission: ***    #Diet/Supplements  Current Diet: Regular  -Multivitamin daily   -Ergocalciferol 1.25mg weekly    #Precautions / PROPHYLAXIS:  - Falls,  Spinal  - ortho: Weight bearing status: WBAT, TLSO brace OOB   - Lungs: Incentive Spirometer   - DVT ppx: Lovenox 40mg daily   - Pressure injury/Skin:  OOB to Chair, PT/OT      ---------------  Code Status: FULL  Emergency Contact:    Outpatient Follow-up (Specialty/Name of physician):    Cait Watkins  Cardiovascular Disease  [address] [place] [address]  Phone: [phone]  Fax: [phone]  Follow Up Time: 2 weeks    Eitan Stroud  Neurosurgery  [address] [place] [address], [address]  [place] [address]  Phone: [phone]  Fax: [phone]  Follow Up Time: 2 weeks    Abdoulaye Mercedes  [facility]  ORTHOSURG [address] [facility]  Scheduled Appointment: 10/22/2024    Angie Koch  [facility]  DERM [address]  Scheduled Appointment: 10/22/2024    Guille Lance  [facility]  ORTHOSURG [address] [facility]  Scheduled Appointment: 11/22/2024    Catalino Cotto  [facility]  WEIGHTMGMT OP [address] [place] [address]  Scheduled Appointment: 12/05/2024    [facility]  GASTRO [address] [facility] [address]  Scheduled Appointment: 01/06/2025      --------------  Goals: Safe discharge to Home*****  Estimated Length of Stay: 10-14 days  Rehab Potential: Good  Medical Prognosis: Good  Estimated Disposition: Home with Home Care  ---------------    PRESCREEN COMPARISON:  I have reviewed the prescreen information and I have found no relevant changes between the preadmission screening and my post admission evaluation.    RATIONALE FOR INPATIENT ADMISSION: Patient demonstrates the following:  [X] Medically appropriate for rehabilitation admission  [X] Has attainable rehab goals with an appropriate initial discharge plan  [X]Has rehabilitation potential (expected to make a significant improvement within a reasonable period of time)  [X] Requires close medical management by a rehab physician, rehab nursing care, Hospitalist and comprehensive interdisciplinary team (including PT, OT, Prosthetics and Orthotics)       Assessment/Plan:  VIRGINIA HUFFMAN is a 74 F with PMHx of OA, HTN, gastroparesis, peripheral neuropathy, multiple prior thoracolumbar spine surgeries for congenital scoliosis done >[place] of [place] w/ removal of hardware (1991) presents to [facility] on 9/28/24  with back pain and left hip pain s/p mechanical fall. Imaging shows acute nondisplaced fracture of T12 veterbral body extending into right pedicle w/o clear cord impingement. S/p T9-L3 open fusion with PSO/lag screw partial corpectomy/interbody with complex plastic closure ( w/ Dr. Stroud and Dr. Tinajero on 9/29).  Post op course c/b CSK leak s/p repair, new onset  paroxsymal Afib with RVR,  increased pain and drainage from surgical incision, development of pleural effusions, symptomatic orthostatic hypotension, urinary retention,  post op anemia, and chest pain with spontaneous resolution. Cardiac work up negative. Patient is now optimized and admitted to [facility] after for initiation of a multidisciplinary rehab program consisting focused on functional mobility, transfers and ADLs.    #T12 vertebral fracture   - S/p T9-L3 open fusion with PSO/lag screw partial corpectomy/interbody with complex plastic closure ( w/ Dr. Stroud and Dr. Tinajero on 9/29)   -9/28 CT Lumbar spine- T12 three column spine fracture-malalignment with fracture extending to the adjacent right 12th posterior rib and left T12-L1 facet joint. and acute, displaced fracture of the left eighth lateral rib.  -10/5  CT Lumbar spine- Status post spinal fusion of T9-L3 with bilateral rods and screws and cylindrical cage within the fractured T12 vertebral body. Resection of the LEFT T12 pedicle and a LEFT hemilaminectomy. Hardware appears intact.   No evidence of hardware loosening. Expected postoperative changes.   -Spinal/fall precautions  -TLSO when OOB  -ICE PRN  -Pain management: Tylenol 975mg ATC, Duloxetine 20mg daily, Lyrica 100mg TID , Flexeril 5mg TID PRN      Comprehensive Multidisciplinary Rehab Program:  - Start comprehensive rehab program, 3 hours a day, 5 days a week.  - PT 2hr/day: Focused on improving strength, endurance, coordination, balance, functional mobility, and transfers  - OT 1hr/day: Focused on improving strength, fine motor skills, coordination, posture and ADLs.    - Activity Limitations: Decreased social, vocational and leisure activities, decreased self care and ADLs, decreased mobility, decreased ability to manage household and finances.     -----------------------------------------------------------------------------------  Concurrent Medical Problems    #Orthostatic hypotension  -Monitor daily  -S/p 1L IVF bolus (10/13) for symptomatic hypotension  -Losartan 100mg daily HOLD  -Chlorthalidone 25mg daily HOLD (may restart if SBP is consistently above 140 per cardio)  -S/p Midodrine     #Seasonal allergies/Nasal congestion  -Singulair 10mg daily   -Ocean spray BID PRN    #HLD  -Atorvastatin 40mg HS    #Paroxysmal A-Fib  -TTE 61%  -S/p IV Lopressor x 1   -Metoprolol 25mg BID for rate control  -Monitor, if continue to be in A-fib, will need full dose AC per cardio    #Hyponatremia (resolved)  - Likely 2/2 SIADH in setting of pain and poor solute intake  - Liberalize fluid restriction in setting of soft BP  - Salt tabs 1g TID today  - Monitor Na (10/16 Na 138)    #Post op Anemia.   - S/p PRBCs intraoperatively   - Hb stable with no evidence of active bleed at this time.   - Monitor H/H (10/16 Hgb 9.5)    #SILVANA (obstructive sleep apnea).   · Trialed on RA bedside. when awake and speaking with me, maintains SpO2>92%, however several time she dozed off    during exam during which she would have transient desaturations.  - Nocturnal 2L O2 and PRN during day during naps  - F/u with PCP outpatient as may benefit from sleep study and CPAP.    #Pleural effusion   - 10/10 CT Abd/pelvis- neg for GI bleed  - S/p one dose of Lasix with significant output  - repeat CXR 10/15 with resolution of pleural effusions    #Liver cyst  -10/10 CT Abd/pelvis- Thereare is a large cyst, stable since prior exam. There are small hypodense foci on segment II and segment I too small to characterize, unchanged since prior.  -Outpatient f/u     #Restless Leg Syndrome  -Pramipexole 1mg daily/ 3.75mg HS    #Elevated alkaline phosphatase level (increasing)  -Outpatient f/u with PCP (10/16 Alk Phos 232)    #Mood/Cognition/Depression  Neuropsychology consult PRN  -Duloxetine 20mg daily    #Sleep:   Maintain quiet hours and low stim environment.  Melatonin 6mg HS to maximize participation in therapy during the day.     #GI/Bowel:  Senna QHS, Miralax PRN Daily  GI ppx: Pantoprazole 40mg daily, Voquenza 10mg daily , Eryped 128mg BID     #/Bladder/Urinary retention  - PVR q 8 hours (SC if > 400)  -Flomax 0.4mg HS     #Skin/Pressure Injury:   - Skin assessment on admission: ***    #Diet/Supplements  Current Diet: Regular  -Multivitamin daily   -Ergocalciferol 1.25mg weekly    #Precautions / PROPHYLAXIS:  - Falls,  Spinal  - ortho: Weight bearing status: WBAT, TLSO brace OOB   - Lungs: Incentive Spirometer   - DVT ppx: Lovenox 40mg daily   - Pressure injury/Skin:  OOB to Chair, PT/OT      ---------------  Code Status: FULL  Emergency Contact:    Outpatient Follow-up (Specialty/Name of physician):    Cait Watkins  Cardiovascular Disease  [address], [address] [place] [address]  Phone: [phone]  Fax: [phone]  Follow Up Time: 2 weeks    Eitan Stroud  Neurosurgery  [address] [facility], [address]  [place] [address]  Phone: [phone]  Fax: [phone]  Follow Up Time: 2 weeks    Abdoulaye Mercedes  [facility]  ORTHOSURG [address] [facility]  Scheduled Appointment: 10/22/2024    Angie Koch  [facility]  DERM [address]  Scheduled Appointment: 10/22/2024    Guille Lance  [facility]  ORTHOSURG [address] [facility]  Scheduled Appointment: 11/22/2024    Catalino Cotto  [facility]  WEIGHTMGMT  [facility]  Scheduled Appointment: 12/05/2024    Haydee Bernstein  [facility]  GASTRO [address] [facility]  Scheduled Appointment: 01/06/2025      --------------  Goals: Safe discharge to Home*****  Estimated Length of Stay: 10-14 days  Rehab Potential: Good  Medical Prognosis: Good  Estimated Disposition: Home with Home Care  ---------------    PRESCREEN COMPARISON:  I have reviewed the prescreen information and I have found no relevant changes between the preadmission screening and my post admission evaluation.    RATIONALE FOR INPATIENT ADMISSION: Patient demonstrates the following:  [X] Medically appropriate for rehabilitation admission  [X] Has attainable rehab goals with an appropriate initial discharge plan  [X]Has rehabilitation potential (expected to make a significant improvement within a reasonable period of time)  [X] Requires close medical management by a rehab physician, rehab nursing care, Hospitalist and comprehensive interdisciplinary team (including PT, OT, Prosthetics and Orthotics)

## 2024-10-16 NOTE — DISCHARGE NOTE NURSING/CASE MANAGEMENT/SOCIAL WORK - NSTOBACCONEVERSMOKERY/N_GEN_A
Social Work Contact - Follow-Up - 8-3-17    SW attempted to contact pt today via phone to follow up regarding pt situation.  SW was unable to connect with pt via phone and LM for pt to call SW back.  SW will await return call from pt.    Ancelmo Fu, DOUGLAS     Social Work  [facility]  Office:  [phone]  E-Mail:  [email or web address]  8/3/2017 1:03 PM      
No

## 2024-10-16 NOTE — H&P ADULT - NSHPLABSRESULTS_GEN_ALL_CORE
Labs             9.5    6.11  )-----------( [phone]      ( 16 Oct 2024 06:53 )             30.7     10-16    138  |  99  |  25[H]  ----------------------------<  109[H]  4.4   |  26  |  0.80    Ca    9.5      16 Oct 2024 06:54    TPro  5.8[L]  /  Alb  3.4  /  TBili  0.3  /  DBili  x   /  AST  31  /  ALT  44  /  AlkPhos  232[H]  10-15    LIVER FUNCTIONS - ( 15 Oct 2024 07:03 )  Alb: 3.4 g/dL / Pro: 5.8 g/dL / ALK PHOS: 232 U/L / ALT: 44 U/L / AST: 31 U/L / GGT: x           Urinalysis Basic - ( 16 Oct 2024 06:54 )    Color: x / Appearance: x / SG: x / pH: x  Gluc: 109 mg/dL / Ketone: x  / Bili: x / Urobili: x   Blood: x / Protein: x / Nitrite: x   Leuk Esterase: x / RBC: x / WBC x   Sq Epi: x / Non Sq Epi: x / Bacteria: x    < from: CT Chest No Cont (09.28.24 @ 05:20) >    IMPRESSION:    Evaluation of the thoracic, abdominal and pelvic organs and vasculature   is limited without intravenous contrast.    T12 three column spine fracture-malalignment with fracture extending to   the adjacent right 12th posterior rib and left T12-L1 facet joint.   Heterogenous spinal canal at the level of the fracture, which is   difficult to assess due to artifact. Recommend MR to assess for and/or   soft tissue/ligamentous-cord injury.    Acute, displaced fracture of the left eighth lateral rib. Chronic   appearing bilateral rib deformities. No pneumothorax. Dependent right   lower lobe opacity, which may be due to atelectasis although pulmonary   contusion is not excludedgiven adjacent injury.  < from: CT Head No Cont (09.28.24 @ 05:20) >    IMPRESSION:    Head CT: No obvious acute intracranial hemorrhage or displaced skull   fracture. If clinically indicated, short-term follow-up or MRI may be   obtained for further evaluation.    Cervical spine CT: No obvious acute fracture or traumatic malalignment in   the cervical spine. If there is clinical suspicion for acute fracture or   ligamentous/cord injury, MRI may be obtained for further evaluation.  < from: CT Thoracic Spine No Cont (10.05.24 @ 12:24) >    IMPRESSION:    Status post spinal fusion of T9-L3 with bilateral rods and screws and   cylindrical cage within the fractured T12 vertebral body. Resection of   the LEFT T12 pedicle and a LEFT hemilaminectomy. Hardware appears intact.   No evidence of hardware loosening. Expected postoperative changes. No   evidence of paraspinal hematoma.  < from: CT Chest w/ IV Cont (10.10.24 @ 04:33) >    IMPRESSION:  Unchanged postoperative appearance of the spine with T12 fracture again   noted.    No evidence of GI bleed.  < from: VA Duplex Lower Ext Vein Scan, Bilat (10.15.24 @ 13:44) >    IMPRESSION:  No evidence of deep venous thrombosis in either lower extremity.

## 2024-10-16 NOTE — DISCHARGE NOTE NURSING/CASE MANAGEMENT/SOCIAL WORK - PATIENT PORTAL LINK FT
You can access the FollowMyHealth Patient Portal offered by [facility] by registering at the following website: [email or web address]. By joining [employer]’s FollowMyHealth portal, you will also be able to view your health information using other applications (apps) compatible with our system.

## 2024-10-16 NOTE — PROGRESS NOTE ADULT - PROBLEM SELECTOR PLAN 7
seen on CT Chest on 10/10  - now s/p one dose of Lasix with significant output  - euvolemic on exam  - repeat CXR 10/15 with resolution of pleural effusions

## 2024-10-16 NOTE — PROGRESS NOTE ADULT - PROBLEM SELECTOR PLAN 9
will need outpt f/u within 2 weeks of discharge
DVT ppx: lovenox    Dispo: Acute Rehab
DVT ppx: lovenox    Dispo: Acute Rehab
will need outpt f/u within 2 weeks of discharge
DVT ppx: lovenox    Dispo: Acute Rehab

## 2024-10-16 NOTE — DISCHARGE NOTE PROVIDER - NSDCFUSCHEDAPPT_GEN_ALL_CORE_FT
Abdoulaye Mercedes  [facility]  ORTHOSURG [address] [facility]  Scheduled Appointment: 10/22/2024    Angie Koch  [facility]  DERM [address]  Scheduled Appointment: 10/22/2024    Guille Lance  [facility]  ORTHOSURG [address] [facility]  Scheduled Appointment: 11/22/2024    Catalino Cotto  [facility]  WEIGHTMGMT  [facility]  Scheduled Appointment: 12/05/2024    Haydee Brenstein  [facility]  GASTRO [address] [facility] [address]  Scheduled Appointment: 01/06/2025

## 2024-10-16 NOTE — PROGRESS NOTE ADULT - PROBLEM SELECTOR PLAN 4
- now back in NSR  - TTE EF 61%  - c/w metoprolol 25mg BID  - Cards consult recs appreciated  - if afib to recur, would need full dose a/c as per Cards  - will need outpatient rhythm monitor
will d/w cards regard trial of diuresis
likely 2/2 surgery/post-op drains/IVF  - Hb stable with no evidence of active bleed at this time. monitor
s/p one dose of Lasix with sig output. f/u c CXR next week
- now back in NSR  - TTE EF 61%  - c/w metoprolol 25mg BID  - Cards consult recs appreciated  - if afib to recur, would need full dose a/c as per Cards  - will need outpatient rhythm monitor
likely 2/2 surgery/post-op drains/IVF  - Hb stable with no evidence of active bleed at this time. monitor
likely 2/2 surgery/post-op drains/IVF  - Hb stable with no evidence of active bleed at this time. monitor

## 2024-10-16 NOTE — PROGRESS NOTE ADULT - PROBLEM SELECTOR PLAN 8
outpt f/u with 1 month of discharge
will need outpt f/u within 2 weeks of discharge
outpt f/u with 1 month of discharge
will need outpt f/u within 2 weeks of discharge
will need outpt f/u within 2 weeks of discharge

## 2024-10-16 NOTE — PROGRESS NOTE ADULT - THIS PATIENT HAS THE FOLLOWING CONDITION(S)/DIAGNOSES ON THIS ADMISSION:
Acute Blood Loss Anemia
None
post op stable/Acute Blood Loss Anemia
None
post op blood loss anemia/Acute Blood Loss Anemia
post op/Acute Blood Loss Anemia
Acute Blood Loss Anemia
None
Acute Blood Loss Anemia
None

## 2024-10-16 NOTE — PROGRESS NOTE ADULT - PROBLEM SELECTOR PLAN 3
Airway  Performed by: Leslee Lopez  Authorized by: Jose Jeffery MD     Final Airway Type:  Supraglottic airway  Final Supraglottic Airway:  Unique  SGA Size*:  5  Airway Seal Pressure (cm H2O):  10  Ventilation Between Attempts:  Bag valve   Patient Identified, Procedure confirmed, Emergency equipment available and Safety protocols followed  Urgency:  Elective  Indications for Airway Management:  Anesthesia  Mask Difficulty Assessment:  0 - not attempted  Performed By:  Anesthesiologist and Resident  Anesthesiologist:  Jose Jeffery MD  Resident:  Leslee Lopez    
likely 2/2 SIADH in setting of pain and poor solute intake  - pain control as per NSGY - controlled on current regimen  - c/w fluid restriction 1L/day  - Na resolved with addition of salt tabs. continue salt tabs through today, then trial off
monitor on tele. cont Metoprolol. f/u cards recs.
likely 2/2 surgery/post-op drains/IVF  - Hb stable with no evidence of active bleed at this time. monitor
monitor on tele. cont Metoprolol. f/u cards recs.
likely 2/2 surgery/post-op drains/IVF  - Hb stable with no evidence of active bleed at this time. monitor
likely 2/2 SIADH in setting of pain and poor solute intake  - pain control as per NSGY - controlled on current regimen  - can liberalize fluid restriction in setting of soft BP  - agree with resuming salt tabs 1g TID today  - monitor Na on BMP
likely 2/2 SIADH in setting of pain and poor solute intake  - pain control as per NSGY - controlled on current regimen  - resolved s/p salt tabs.

## 2024-10-16 NOTE — PROGRESS NOTE ADULT - PROBLEM SELECTOR PROBLEM 7
Pleural effusion
Hypophosphatemia
Hypophosphatemia
Liver cyst
Pleural effusion
Elevated alkaline phosphatase level

## 2024-10-16 NOTE — H&P ADULT - NSHPSOCIALHISTORY_GEN_ALL_CORE
SOCIAL HISTORY  Smoking -   EtOH -   Drugs -     FUNCTIONAL HISTORY  Patient lives with her son in an elevator accessible apartment building with no stairs to enter. PTA, pt. was independent in ADLs & mobility with RW. As per patient, her son is a special child & requires assistance.    CURRENT FUNCTIONAL STATUS  - Bed Mobility: Min A; 1PA; bed rails  - Transfers: Min A; 1PA; FWB; RW; TLSO brace OOB  - Gait: Min A; 1PA; FWB; 5 feet with RW; TLSO brace OOB  - ADLs:  -Lower Body Dressing: Max A; 1PA

## 2024-10-16 NOTE — PROGRESS NOTE ADULT - ASSESSMENT
HPI:  74F on celecoxib for OA last took y’day otherwise no AC/AP h/o HTN, gastroparesis, peripheral neuropathy, multiple prior thoracolumbar spine surgeries for congenital scoliosis [place] >[place] state w/ removal of hardware 1991 presents with back pain s/p mechanical fall from sitting y'day. Describes severe mid back pain and left hip pain, no numbness, weakness, saddle anesthesia, b/b symptoms. Pain worsened with movement, axial loading. CT TL spine w/ acute nondisplaced fracture of T12 veterbral body extending into right pedicle w/o clear cord impingement. (28 Sep 2024 15:45)    PROCEDURE: Fusion, spine, thoracolumbar, posterior approach    Muscle flap, trunk     s/p T9-L3 posterior instrumentation and fusion, PSO and interbody on 9/29         PLAN:  Neuro:   1 Out of bed   2 continue pt/ot   3 prn pain meds   4 continue lyrica   5 continue pramipexole  and cymbalta -home meds     Respiratory:   1 on 2l cannula at times   2 discussed with hospitalist - likely secondary to sleep apnea - will continuer oxygen as needed   3 continue on singular- h/o asthma ?    CV:  1 Blood pressure stable   2 paroxysmal afib - on metoprolol 25 bid   3 appreciate cardiology input   4 holding other oral bp meds including diuretic due to orthostatic hypotension -will check orthostatic bp today   5 10/1 ECHO ef 61 %   Endocrine:   1 stable     Heme/Onc:             DVT ppx: sql and scds   1 Post op blood loss anemia - stable   2 LED negative on 9/30     Renal:   1 voiding   2 continue on flomax   3 iv lock   4 hyponatremia- started on salt tab    ID:   1 afebrile     GI:   1 regular diet   2 last bm 10/14   3 continue voquenza for gerd treatment    4 continue on eyped- h/o gastroparesis     Social/Family:   Discharge planning: pending acute rehab     - 35 minutes spent for patient care and answered all questions   - will discuss with Dr. Stroud   -rainer [phone]      HPI:  74F on celecoxib for OA last took y’day otherwise no AC/AP h/o HTN, gastroparesis, peripheral neuropathy, multiple prior thoracolumbar spine surgeries for congenital scoliosis done >10 years ago out of state w/ removal of hardware 1991 presents with back pain s/p mechanical fall from sitting y'day. Describes severe mid back pain and left hip pain, no numbness, weakness, saddle anesthesia, b/b symptoms. Pain worsened with movement, axial loading. CT TL spine w/ acute nondisplaced fracture of T12 veterbral body extending into right pedicle w/o clear cord impingement. (28 Sep 2024 15:45)    PROCEDURE: Fusion, spine, thoracolumbar, posterior approach    Muscle flap, trunk     s/p T9-L3 posterior instrumentation and fusion, PSO and interbody on 9/29         PLAN:  Neuro:   1 Out of bed   2 continue pt/ot   3 prn pain meds   4 continue lyrica   5 continue pramipexole  and cymbalta -home meds     Respiratory:   1 on 2l cannula at times   2 discussed with hospitalist - likely secondary to sleep apnea - will continuer oxygen as needed   3 continue on singular- h/o asthma ?    CV:  1 Blood pressure stable   2 paroxysmal afib - on metoprolol 25 bid   3 appreciate cardiology input   4 holding other oral bp meds including diuretic due to orthostatic hypotension -orthostatic hypotension resolved   5 10/1 ECHO ef 61 %   Endocrine:   1 stable     Heme/Onc:             DVT ppx: sql and scds   1 Post op blood loss anemia - stable   2 LED negative on 9/30     Renal:   1 voiding   2 continue on flomax   3 iv lock   4 hyponatremia- resolved     ID:   1 afebrile     GI:   1 regular diet   2 last bm 10/14   3 continue voquenza for gerd treatment    4 continue on eyped- h/o gastroparesis       Patient is cleared medically and cardiologically for discharge to rehab.   Social/Family:   Discharge planning: pending acute rehab     - 35 minutes spent for patient care and answered all questions   - will discuss with Dr. Stroud   -rainer [phone]

## 2024-10-16 NOTE — PROGRESS NOTE ADULT - ASSESSMENT
75 yo F with PMH of HTN, gastroparesis, peripheral neuropathy, multiple prior thoracolumbar spine surgeries for congenital scoliosis presents with back pain s/p mechanical fall from sitting. PTA she sat down on her walker bec her legs felt fatigued and started pushing herself around but fell off curb. Found with T12 three column spine fracture-malalignment with fracture extending to the adjacent right 12th posterior rib and left T12-L1 facet joint s/p T9-L3 fusion on 9/29 c EBL 1475 cc. post-op course c/b recurrent brief Afib (back in NSR), anemia, and hyponatremia now resolved.

## 2024-10-16 NOTE — PROGRESS NOTE ADULT - PROBLEM SELECTOR PLAN 6
trialed on RA bedside. when awake and speaking with me, maintains SpO2>92%, however several time she dozed off during exam during which she would have transient desaturations.  - we discussed she likely has sleep apnea and states this is known diagnosis for her  - c/w nocturnal O2 and PRN during day during naps  - advised her to f/u with PCP outpatient as may benefit from sleep study and CPAP
trialed on RA bedside. when awake and speaking with me, maintains SpO2>92%, however several time she dozed off during exam during which she would have transient desaturations.  - we discussed she likely has sleep apnea and states this is known diagnosis for her  - c/w nocturnal O2 and PRN during day during naps  - advised her to f/u with PCP outpatient as may benefit from sleep study and CPAP - states she was in process of being fitted for CPAP mask but issues with fitting given her dental/mouth structure
outpt f/u with 1 month of discharge
repleted. monitor
outpt f/u with 1 month of discharge
s/p one dose of Lasix with significant output  - euvolemic on exam  - CXR with mild pulm edema but overall improved compared to prior CXR, official read pending

## 2024-10-16 NOTE — DISCHARGE NOTE PROVIDER - NSDCCPCAREPLAN_GEN_ALL_CORE_FT
PRINCIPAL DISCHARGE DIAGNOSIS  Diagnosis: Thoracic spine fracture  Assessment and Plan of Treatment: Patient went to OR on 9/29 abd had a T9-L3 posterior instrumentation and fusion, PSO and interbody for T12 fracture.   - Keep surgical site open to air.   - Please do not put any lotion or cream on the incision.   - Please use brace once patient is out of bed   - If notice any new weakness, severe pain, fever, discharge or opening at surgical then please contact Dr. Stroud or come back to emergency room.      SECONDARY DISCHARGE DIAGNOSES  Diagnosis: Paroxysmal atrial fibrillation  Assessment and Plan of Treatment: - Continue on metoprolol    Diagnosis: SILVANA (obstructive sleep apnea)  Assessment and Plan of Treatment: -Continue on 2l nasal canula at night time and day time during naps.   -Patient will need to follow up with primary care provider as outpatient for referral for sleep study .    Diagnosis: HTN (hypertension)  Assessment and Plan of Treatment: -May restart chlorthalidone 25 mg daily if sbp consistently above 140   -Losartan only as outpatient after follow up with primary care provider.

## 2024-10-16 NOTE — PROGRESS NOTE ADULT - PROBLEM SELECTOR PROBLEM 4
Paroxysmal atrial fibrillation
Anemia
Pleural effusion
Anemia
Pleural effusion
Anemia
Paroxysmal atrial fibrillation

## 2024-10-16 NOTE — DISCHARGE NOTE PROVIDER - PROVIDER TOKENS
PROVIDER:[TOKEN:[[id number]],FOLLOWUP:[2 weeks]],PROVIDER:[TOKEN:[[id number]],FOLLOWUP:[2 weeks]]

## 2024-10-16 NOTE — DISCHARGE NOTE PROVIDER - CARE PROVIDERS DIRECT ADDRESSES
[email or web address]

## 2024-10-16 NOTE — DISCHARGE NOTE PROVIDER - CARE PROVIDER_API CALL
Cait Watkins  Cardiovascular Disease  [address], [address] [place] [address]  Phone: [phone]  Fax: [phone]  Follow Up Time: 2 weeks    Eitan Stroud  Neurosurgery  [address] [place] [address], [address]  [place] [address]  Phone: [phone]  Fax: [phone]  Follow Up Time: 2 weeks

## 2024-10-16 NOTE — PROGRESS NOTE ADULT - PROBLEM SELECTOR PROBLEM 5
Paroxysmal atrial fibrillation
Paroxysmal atrial fibrillation
Liver cyst
Liver cyst
Pleural effusion
Pleural effusion
Paroxysmal atrial fibrillation

## 2024-10-16 NOTE — PROGRESS NOTE ADULT - PROVIDER SPECIALTY LIST ADULT
Anesthesia
Anesthesia
Cardiology
Cardiology
Hospitalist
NSICU
NSICU
Neurosurgery
Neurosurgery
Orthopedics
Plastic Surgery
Rehab Medicine
Cardiology
Hospitalist
NSICU
Neurosurgery
Rehab Medicine
Hospitalist
NSICU
Neurosurgery
NSICU
Neurosurgery
NSICU
Neurosurgery
Hospitalist

## 2024-10-16 NOTE — PROGRESS NOTE ADULT - SUBJECTIVE AND OBJECTIVE BOX
Falguni Leone MD  Division of Hospital Medicine  [facility]  Spectra: [phone]      Patient is a 74y old  Female who presents with a chief complaint of 74F on celecoxib for OA last took y’day otherwise no AC/AP h/o HTN, gastroparesis, peripheral neuropathy, multiple prior thoracolumbar spine surgeries for congenital scoliosis done >10 years ago out of state w/ removal of hardware 1991 presents with back pain s/p mechanical fall from sitting y'day. Describes severe mid back pain and left hip pain, no numbness, weakness, saddle anesthesia, b/b symptoms. Pain worsened with movement, axial loading. CT TL spine w/ acute nondisplaced fracture of T12 veterbral body extending into right pedicle w/o clear cord impingement.   (16 Oct 2024 10:34)      SUBJECTIVE / OVERNIGHT EVENTS: no acute events overnight. no fever, chills, dizziness, chest pain, nor dyspnea. overall feels well. has some positional discomfort if in the bed too long but otherwise doing well.  ADDITIONAL REVIEW OF SYSTEMS:    MEDICATIONS  (STANDING):  acetaminophen     Tablet .. 1000 milliGRAM(s) Oral every 8 hours  acyclovir Topical 5% Ointment 1 Application(s) Topical five times a day  atorvastatin 40 milliGRAM(s) Oral at bedtime  calamine/zinc oxide Lotion 1 Application(s) Topical daily  DULoxetine 20 milliGRAM(s) Oral daily  enoxaparin Injectable 40 milliGRAM(s) SubCutaneous every 24 hours  ergocalciferol 12265 Unit(s) Oral every week  erythromycin    ethylsuccinate Suspension 40 mG/mL 128 milliGRAM(s) Oral every 12 hours  metoprolol tartrate 25 milliGRAM(s) Oral two times a day  montelukast 10 milliGRAM(s) Oral daily  multivitamin 1 Tablet(s) Oral daily  polyethylene glycol 3350 17 Gram(s) Oral two times a day  pramipexole 3.75 milliGRAM(s) Oral at bedtime  pramipexole 1 milliGRAM(s) Oral <User Schedule>  pregabalin 100 milliGRAM(s) Oral three times a day  tamsulosin 0.4 milliGRAM(s) Oral at bedtime  voquenza  10 mg 10 milliGRAM(s) 1 Tablet(s) Oral daily    MEDICATIONS  (PRN):  cyclobenzaprine 5 milliGRAM(s) Oral three times a day PRN Muscle Spasm  diphenhydrAMINE 25 milliGRAM(s) Oral every 8 hours PRN Rash and/or Itching  HYDROmorphone   Solution 1 milliGRAM(s) Oral every 4 hours PRN Severe Pain (7 - 10)  sodium chloride 0.65% Nasal 1 Spray(s) Both Nostrils two times a day PRN Nasal Congestion      CAPILLARY BLOOD GLUCOSE        I&O's Summary    15 Oct 2024 07:01  -  16 Oct 2024 07:00  --------------------------------------------------------  IN: 900 mL / OUT: 2200 mL / NET: -1300 mL    16 Oct 2024 07:01  -  16 Oct 2024 13:25  --------------------------------------------------------  IN: 480 mL / OUT: 0 mL / NET: 480 mL        PHYSICAL EXAM:  Vital Signs Last 24 Hrs  T(C): 36.6 (16 Oct 2024 05:10), Max: 37.2 (15 Oct 2024 20:05)  T(F): 97.8 (16 Oct 2024 05:10), Max: 98.9 (15 Oct 2024 20:05)  HR: 80 (16 Oct 2024 05:10) (80 - 94)  BP: 117/67 (16 Oct 2024 05:10) (98/55 - 148/72)  BP(mean): --  RR: 18 (16 Oct 2024 05:10) (18 - 18)  SpO2: 97% (16 Oct 2024 05:10) (97% - 99%)    Parameters below as of 16 Oct 2024 05:10  Patient On (Oxygen Delivery Method): nasal cannula    CONSTITUTIONAL: NAD, well-developed, well-groomed  EYES: PERRLA; conjunctiva and sclera clear  ENMT: Moist oral mucosa, no pharyngeal injection or exudates; normal dentition  NECK: Supple, no palpable masses; no thyromegaly  RESPIRATORY: Normal respiratory effort; lungs are clear to auscultation bilaterally, no wheeze nor crackles  CARDIOVASCULAR: Regular rate and rhythm, normal S1 and S2, no murmur/rub/gallop; No lower extremity edema  ABDOMEN: Soft, Nondistended, Nontender to palpation; normoactive bowel sounds  MUSCULOSKELETAL: No clubbing or cyanosis of digits; no joint swelling or tenderness to palpation  PSYCH: A+O to person, place, and time; affect appropriate  NEUROLOGY: CN 2-12 are intact and symmetric; no gross sensory deficits, grossly 5/5 strength throughout  SKIN: No rashes; no palpable lesions    LABS:                        9.5    6.11  )-----------( 547      ( 16 Oct 2024 06:53 )             30.7     10-16    138  |  99  |  25[H]  ----------------------------<  109[H]  4.4   |  26  |  0.80    Ca    9.5      16 Oct 2024 06:54    TPro  5.8[L]  /  Alb  3.4  /  TBili  0.3  /  DBili  x   /  AST  31  /  ALT  44  /  AlkPhos  232[H]  10-15      Urinalysis Basic - ( 16 Oct 2024 06:54 )    Color: x / Appearance: x / SG: x / pH: x  Gluc: 109 mg/dL / Ketone: x  / Bili: x / Urobili: x   Blood: x / Protein: x / Nitrite: x   Leuk Esterase: x / RBC: x / WBC x   Sq Epi: x / Non Sq Epi: x / Bacteria: x        RADIOLOGY & ADDITIONAL TESTS:  Results Reviewed: no leukocytosis, H/H stable, Cr stable  Imaging Personally Reviewed:  Electrocardiogram Personally Reviewed:    COORDINATION OF CARE:  Care Discussed with Consultants/Other Providers [Y]: Neurosurgery LIZZY Gonzalez  Prior or Outpatient Records Reviewed [Y/N]:

## 2024-10-16 NOTE — PROGRESS NOTE ADULT - PROBLEM SELECTOR PLAN 1
Sunday reports start of testicle swelling and pain. Denies nausea, vomiting, or urinary issues.  
likely sec to surgery/ post-op drains/IVF. monitor
s/p T9-L3 fusion on 9/29. PT, pain control per neurosurgery
s/p T9-L3 fusion on 9/29. PT, pain control per neurosurgery
likely sec to surgery/ post-op drains/IVF. monitor
s/p T9-L3 fusion on 9/29. PT, pain control per neurosurgery  - would c/w decreased dilaudid 1mg PRN as she was too somnolent after the 2mg doses. tolerating 2mg doses with no issues.
s/p T9-L3 fusion on 9/29. PT, pain control per neurosurgery
s/p T9-L3 fusion on 9/29. PT, pain control per neurosurgery  - would decrease dilaudid dosing from 2mg to 1mg as she is too somnolent after the 2mg doses

## 2024-10-16 NOTE — H&P ADULT - HISTORY OF PRESENT ILLNESS
74 F with PMHx of OA, HTN, gastroparesis, peripheral neuropathy, multiple prior thoracolumbar spine surgeries for congenital scoliosis done >[place] years ago out of [place] w/ removal of hardware (1991) presents to [facility] on 9/28/24  with back pain and left hip pain s/p mechanical fall.  CT imaging shows T12 three column spine fracture-malalignment with fracture extending to the adjacent right 12th posterior rib and left T12-L1 facet joint and acute, displaced fracture of the left eighth lateral rib. Chronic appearing bilateral rib deformities. S/p T9-L3 open fusion with PSO/lag screw partial corpectomy/interbody with complex plastic closure ( w/ Dr. Stroud and Dr. Tinajero on 9/29)  Post op course c/b CSK leak s/p repair, new onset  paroxsymal Afib with RVR,  increased pain and drainage from surgical incision, development of pleural effusions, symptomatic orthostatic hypotension, urinary retention,  post op anemia, and chest pain with spontaneous resolution. Cardiac work up negative. Patient optimized andwas evaluated by PM&R and therapy for functional deficits, gait/ADL impairments and acute rehabilitation was recommended. Patient was cleared for discharge to [facility] IRU on 10/16/24.

## 2024-10-16 NOTE — PROGRESS NOTE ADULT - NSPROGADDITIONALINFOA_GEN_ALL_CORE
.  Falguni Leone MD  Division of Hospital Medicine  [facility]   Spectra: [phone]    No contraindication from medicine standpoint for discharge to rehab.    Plan discussed with patient and Neurosurgery LIZZY Gonzalez.

## 2024-10-16 NOTE — DISCHARGE NOTE PROVIDER - HOSPITAL COURSE
Patient went to OR on 9/29 abd had a T9-L3 posterior instrumentation and fusion, PSO and interbody  Poat procedure patient was admitted to icu   MAP >65 /p 2U pRBCS intra-op, EBL 1.4L  9/30 drain came out when working w/ PT, replaced    10/4 new onset of Afib - cardiology saw. Patient was started on metoprolol.   10/5 D/C HMVs, afib RVR 5mg metoprolol  [facility]  HOB <30 deg, till 10/7 per Dr. Luanne Rubi when OOB  Working w/ PT    10/8 AR once Med/Cards clear A.Fib to NSR-Metoprolol inc, DC on SQL to rehab per cards. Need full dose AC if A. Fib returns    10/9 h/h drop (7.8 > 8.9); stable on rpt 8.1, cardiology; only need AC if afib returns; on dvt ppx, dumont replaced today for urinary retention  10/10  HH 8.4 stable today   diarrhea, bowel regimen held  back on oxycodone low dose as pt got loopy with high dose (10), chronic pain consult pending per family request   Keep HOB 45 for upper back collection  lasix 20 x1 per cardiology for pleural effusion  Na 132<131, got salt tab, f/u BMP in am  10/11 switched to PO dilaudid 1/2, increased lyrica, dumont replaced 10/9  10/12pending TOV- voiding,  tLSO brace to be refitted  10/13   passed TOV, pain better controlled, CXR 10/14 to f/u effusions, Na 130 despite 1L FR salt tabs added. Had chest pain EKG/trop-neg. Too sleepy with robaxin, dc'd and switched to flexeril. Orthostatic, held PM metroprolol, gave fluids BP improved.    10/14 orthostatic/ hypotensive, - losartan, chlorthalidone on hold, metorpolol decreased, midodrine dose x1, on 2L NC  lyrica increased to 100 TID, c/o intermittent Left hip with shooting pain to feet  Cards saw; NTD for chest pain  10/15 Orthostatic negative -walked with PT   LED :negative   on 2l nc- per hospitalist likely from sleep apnea and no need for cta chest.     Patient was medically and neurologically stable for discharge. Patient was discharged to rehab with follow up instructions.

## 2024-10-16 NOTE — PROGRESS NOTE ADULT - REASON FOR ADMISSION
Back pain 2/2 nondisplaced fracture of T12
Patient was admitted with unstable T12 3 column fracture after a fall
Three column T12 Fx s/p T9-L3 fusion
acute non displaced 3 column fracture of T12
Back pain 2/2 nondisplaced fracture of T12
T9-L3 fusion
s/p T9-L3 open fusion with PSO/lag screw partial corpectomy/interbody with complex plastic closure (09/29/24)
s/p T9-L3 open fusion with PSO/lag screw partial corpectomy/interbody with complex plastic closure (09/29/24)
Three column T12 Fx s/p T9-L3 fusion
Patient was admitted with unstable T12 3 column fracture after a fall.
acute non displaced 3 column fracture of T12
s/p T9-L3 open fusion with PSO/lag screw partial corpectomy/interbody with complex plastic closure (09/29/24)
CP

## 2024-10-16 NOTE — PROGRESS NOTE ADULT - PROBLEM SELECTOR PROBLEM 8
Elevated alkaline phosphatase level
Liver cyst
Liver cyst

## 2024-10-16 NOTE — PROGRESS NOTE ADULT - PROBLEM SELECTOR PROBLEM 2
Hypotension
S/P repair of vertebral fracture
Hyponatremia
Hyponatremia
Hypotension
S/P repair of vertebral fracture
Hypotension

## 2024-10-16 NOTE — DISCHARGE NOTE PROVIDER - NSDCMRMEDTOKEN_GEN_ALL_CORE_FT
acetaminophen 500 mg oral tablet: 1 tab(s) orally every 6 hours as needed for  mild pain  atorvastatin 40 mg oral tablet: 1 tab(s) orally once a day (at bedtime)  cyclobenzaprine 5 mg oral tablet: 1 tab(s) orally 3 times a day As needed Muscle Spasm  DULoxetine 20 mg oral delayed release capsule: 1 cap(s) orally once a day  enoxaparin: 40 milligram(s) subcutaneous once a day  ergocalciferol 1.25 mg (50,000 intl units) oral capsule: 1 cap(s) orally once a week  erythromycin ethylsuccinate 100 mg/2.5 mL oral liquid: 128 milligram(s) orally every 12 hours  HYDROmorphone 1 mg/mL oral liquid: 1 milliliter(s) orally every 4 hours As needed Severe Pain (7 - 10)  metoprolol tartrate 25 mg oral tablet: 1 tab(s) orally 2 times a day  montelukast 10 mg oral tablet: 1 tab(s) orally once a day  Multiple Vitamins oral tablet: 1 tab(s) orally once a day  polyethylene glycol 3350 oral powder for reconstitution: 17 gram(s) orally 2 times a day  pramipexole: 3.75 milligram(s) orally once a day (at bedtime)  pramipexole 1 mg oral tablet: 1 tab(s) orally once a day at 6 am  pregabalin 100 mg oral capsule: 1 cap(s) orally 3 times a day  tamsulosin 0.4 mg oral capsule: 1 cap(s) orally once a day (at bedtime)  Voquezna 10 mg oral tablet: 1 tab(s) orally once a day

## 2024-10-16 NOTE — PROGRESS NOTE ADULT - PROBLEM SELECTOR PLAN 2
- s/p 1L bolus on 10/13  - resolved  - STOP losartan 100mg daily and chlorthalidone 25mg daily, last dose was 10/14 AM  - required midodrine x 1 on 10/14 as had already received all her anti-hypertensives  - c/w metoprolol tartrate 25mg BID for rate control (pAF and pAT) with hold parameters  - continue to hold losartan and chlorthalidone on d/c  - at rehab, if SBP>140s persistently, would first re-introduce chlorthalidone 25mg PO daily first

## 2024-10-16 NOTE — PROGRESS NOTE ADULT - PROBLEM SELECTOR PROBLEM 1
S/P repair of vertebral fracture
Anemia
S/P repair of vertebral fracture
S/P repair of vertebral fracture
Anemia

## 2024-10-16 NOTE — PROGRESS NOTE ADULT - SUBJECTIVE AND OBJECTIVE BOX
SUBJECTIVE:   Patient was seen and evaluated at bedside. Patient is resting in bed and is in no new acute distress.   OVERNIGHT EVENTS:   none   Vital Signs Last 24 Hrs  T(C): 36.6 (16 Oct 2024 05:10), Max: 37.2 (15 Oct 2024 20:05)  T(F): 97.8 (16 Oct 2024 05:10), Max: 98.9 (15 Oct 2024 20:05)  HR: 80 (16 Oct 2024 05:10) (80 - 94)  BP: 117/67 (16 Oct 2024 05:10) (98/55 - 148/72)  BP(mean): --  RR: 18 (16 Oct 2024 05:10) (18 - 18)  SpO2: 97% (16 Oct 2024 05:10) (97% - 99%)    Parameters below as of 16 Oct 2024 05:10  Patient On (Oxygen Delivery Method): nasal cannula        PHYSICAL EXAM:    General: No Acute Distress     Neurological:   Awake, alert oriented to person, place and time, Following Commands, PERRL, EOMI, Face Symmetrical, Speech Fluent, Moving all extremities, Muscle Strength normal in all four extremities, No Drift, Sensation to Light Touch Intact    Pulmonary: Clear to Auscultation, No Rales, No Rhonchi, No Wheezes     Cardiovascular: S1, S2, Regular Rate and Rhythm     Gastrointestinal: Soft, Nontender, Nondistended     Incision:   clean and dry   LABS:                        9.5    6.11  )-----------( 547      ( 16 Oct 2024 06:53 )             30.7    10-16    138  |  99  |  25[H]  ----------------------------<  109[H]  4.4   |  26  |  0.80    Ca    9.5      16 Oct 2024 06:54  Phos  3.4     10-14  Mg     2.1     10-14    TPro  5.8[L]  /  Alb  3.4  /  TBili  0.3  /  DBili  x   /  AST  31  /  ALT  44  /  AlkPhos  232[H]  10-15        10-15 @ 07:01  -  10-16 @ 07:00  --------------------------------------------------------  IN: 900 mL / OUT: 2200 mL / NET: -1300 mL    10-16 @ 07:01  -  10-16 @ 10:06  --------------------------------------------------------  IN: 480 mL / OUT: 0 mL / NET: 480 mL      DRAINS:     MEDICATIONS:  Antibiotics:  erythromycin    ethylsuccinate Suspension 40 mG/mL 128 milliGRAM(s) Oral every 12 hours    Neuro:  acetaminophen     Tablet .. 1000 milliGRAM(s) Oral every 8 hours  cyclobenzaprine 5 milliGRAM(s) Oral three times a day PRN Muscle Spasm  diphenhydrAMINE 25 milliGRAM(s) Oral every 8 hours PRN Rash and/or Itching  DULoxetine 20 milliGRAM(s) Oral daily  HYDROmorphone   Solution 1 milliGRAM(s) Oral every 4 hours PRN Severe Pain (7 - 10)  pramipexole 3.75 milliGRAM(s) Oral at bedtime  pramipexole 1 milliGRAM(s) Oral <User Schedule>  pregabalin 100 milliGRAM(s) Oral three times a day    Cardiac:  metoprolol tartrate 25 milliGRAM(s) Oral two times a day    Pulm:  montelukast 10 milliGRAM(s) Oral daily    GI/:  polyethylene glycol 3350 17 Gram(s) Oral two times a day  tamsulosin 0.4 milliGRAM(s) Oral at bedtime    Other:   acyclovir Topical 5% Ointment 1 Application(s) Topical five times a day  atorvastatin 40 milliGRAM(s) Oral at bedtime  calamine/zinc oxide Lotion 1 Application(s) Topical daily  enoxaparin Injectable 40 milliGRAM(s) SubCutaneous every 24 hours  ergocalciferol 86589 Unit(s) Oral every week  multivitamin 1 Tablet(s) Oral daily  sodium chloride 0.65% Nasal 1 Spray(s) Both Nostrils two times a day PRN Nasal Congestion  voquenza  10 mg 10 milliGRAM(s) 1 Tablet(s) Oral daily    DIET: [] Regular [] CCD [] Renal [] Puree [] Dysphagia [] Tube Feeds:   regular   IMAGING:   ACC: [id number] EXAM:  CT CHEST IC   ORDERED BY: CORRY KRAUSE     ACC: [id number] EXAM:  CT ABDOMEN AND PELVIS IC   ORDERED BY: CORRY KRAUSE     PROCEDURE DATE:  10/10/2024          INTERPRETATION:  CLINICAL INFORMATION: Decreased hemoglobin. Question GI  bleed.    COMPARISON: CT chest 9/28/2024. CT abdomen and pelvis 9/28/2024..    CONTRAST/COMPLICATIONS:  IV Contrast: IV contrast documented in unlinked concurrent exam   (accession [id number]), Omnipaque 350 (accession [id number])  90 cc   administered  10 cc discarded  Oral Contrast: NONE  Complications: None reported at time of study completion    PROCEDURE:  CT of the Chest, Abdomen and Pelvis was performed.  Arterial, portal venous and delayed phase imaging was performed through   the upper abdomen.  Sagittal and coronal reformats were performed.    FINDINGS:  CHEST:  LUNGS AND LARGE AIRWAYS: Patent central airways. Bilateral pleural   effusions with adjacent atelectasis.  VESSELS: Aortic calcifications. Coronary artery calcifications.  HEART: Heart size is normal. No pericardial effusion.  MEDIASTINUM AND PILAR: No lymphadenopathy. Small hiatal hernia.  CHEST WALL AND LOWER NECK: Within normal limits.    ABDOMEN AND PELVIS:  Evaluation is limited due to streak artifact.    LIVER: Thereare is a large cyst, stable since prior exam. There are   small hypodense foci on segment II and segment I too small to   characterize, unchanged since prior.  BILE DUCTS: Normal caliber.  GALLBLADDER: Within normal limits.  SPLEEN: Within normal limits.  PANCREAS: Within normal limits.  ADRENALS: Within normal limits.  KIDNEYS/URETERS: Within normal limits.    BLADDER: Limited evaluation.  REPRODUCTIVE ORGANS: Limited evaluation.    BOWEL: No bowel obstruction. Appendix is normal. No bleeding identified.  PERITONEUM/RETROPERITONEUM: Within normal limits.  VESSELS: Atherosclerotic changes.  LYMPH NODES: No lymphadenopathy.  ABDOMINAL WALL: Left abdominal wall laxity.  BONES: Right shoulder arthroplasty. Bilateral hip arthroplasties. Status   post spinal fusion of T9-L3 with bilateral rods and screws and   cylindrical cage within the superior T12 vertebral body fracture   fragments. Mild ill-defined fluid is noted overlying the surgical bed.   Resection of the left T12 pedicle and left hemilaminectomy. Degenerative   changes. Left lateral 7th through 10th rib fractures. Chronic appearing   right rib fractures. Heterogeneity of the sacrum and SI joints again   noted.    IMPRESSION:  Unchanged postoperative appearance of the spine with T12 fracture again   noted.    No evidence of GI bleed.    --- End of Report ---          KILEY GRANADOS MD; Resident Radiologist  This document has been electronically signed.  KATHIA FERNANDEZ MD; Attending Radiologist  This document has been electronically signed. Oct 10 2024  9:49AM  ACC: [id number] EXAM:  CT THORACIC SPINE   ORDERED BY: TAYLOR IBARRA     ACC: [id number] EXAM:  CT LUMBAR SPINE   ORDERED BY: TAYLOR IBARRA     PROCEDURE DATE:  10/05/2024          INTERPRETATION:  CLINICAL INFORMATION: T12 fracture status post fusion.    COMPARISON: CT thoracic and lumbar spine 9/28/2024, CT abdomen and pelvis   9/28/2024.    CONTRAST:  IV Contrast: None  Complications: None reported at time of study completion.    TECHNIQUE:    CT SPINE: Axial images were obtained of the thoracic and lumbar spine   using multislice helical technique. Reformatted coronal and sagittal   images were obtained.        CT THORACIC AND LUMBAR SPINE:    Status post spinal fusion of T9-L3 with bilateral rods and screws and   cylindrical cage within the superior T12 vertebral body fracture   fragments. No evidence of hardware loosening. There are expected   postoperative changes. There are 2 drainage catheters entering from the   back. No evidence of paraspinal hematoma.    Comminuted distracted fracture involving the T12 vertebral body extending   to the T12-L1 facet joint and lamina are not again noted. There is been   resection of the LEFT T12 pedicle and a LEFT hemilaminectomy. There is   persistent mild anterior displacement of the inferior aspect of the T12   vertebral body with respect the endplate, similar to prior.    Decreased bone mineralization and other degenerative changes of the   spine. Osseous fusion again noted to involve the anterior and posterior   vertebral bodies extending from T10 into the sacrum likely due to an   inflammatory spinal arthropathy.    MISCELLANEOUS:  Small bilateral pleural effusions with associated   dependent atelectasis. Atherosclerotic changes of the aorta.      IMPRESSION:    Status post spinal fusion of T9-L3 with bilateral rods and screws and   cylindrical cage within the fractured T12 vertebral body. Resection of   the LEFT T12 pedicle and a LEFT hemilaminectomy. Hardware appears intact.   No evidence of hardware loosening. Expected postoperative changes. No   evidence of paraspinal hematoma.    --- End of Report ---          DIMITRY BRUNSON MD; Resident Radiologist  This document has been electronically signed.   ANGELIKA RIVERA MD; Attending Radiologist  This document has been electronically signed. Oct  5 2024  2:23PM

## 2024-10-17 ENCOUNTER — NON-APPOINTMENT (OUTPATIENT)
Age: 74
End: 2024-10-17

## 2024-10-17 ENCOUNTER — INPATIENT (INPATIENT)
Facility: HOSPITAL | Age: 74
LOS: 5 days | Discharge: OTHER INSTITUTION | DRG: 552 | End: 2024-10-23
Attending: PHYSICAL MEDICINE & REHABILITATION | Admitting: PHYSICAL MEDICINE & REHABILITATION
Payer: MEDICARE

## 2024-10-17 VITALS
RESPIRATION RATE: 16 BRPM | HEART RATE: 80 BPM | WEIGHT: 196.87 LBS | DIASTOLIC BLOOD PRESSURE: 66 MMHG | HEIGHT: 60 IN | TEMPERATURE: 98 F | OXYGEN SATURATION: 95 % | SYSTOLIC BLOOD PRESSURE: 110 MMHG

## 2024-10-17 VITALS
DIASTOLIC BLOOD PRESSURE: 62 MMHG | TEMPERATURE: 98 F | OXYGEN SATURATION: 96 % | RESPIRATION RATE: 18 BRPM | HEART RATE: 84 BPM | SYSTOLIC BLOOD PRESSURE: 113 MMHG

## 2024-10-17 DIAGNOSIS — Z98.890 OTHER SPECIFIED POSTPROCEDURAL STATES: Chronic | ICD-10-CM

## 2024-10-17 DIAGNOSIS — S22.009A UNSPECIFIED FRACTURE OF UNSPECIFIED THORACIC VERTEBRA, INITIAL ENCOUNTER FOR CLOSED FRACTURE: ICD-10-CM

## 2024-10-17 DIAGNOSIS — Z96.643 PRESENCE OF ARTIFICIAL HIP JOINT, BILATERAL: Chronic | ICD-10-CM

## 2024-10-17 DIAGNOSIS — Z98.49 CATARACT EXTRACTION STATUS, UNSPECIFIED EYE: Chronic | ICD-10-CM

## 2024-10-17 LAB — SARS-COV-2 RNA SPEC QL NAA+PROBE: SIGNIFICANT CHANGE UP

## 2024-10-17 RX ORDER — METOPROLOL TARTRATE 50 MG
25 TABLET ORAL
Refills: 0 | Status: DISCONTINUED | OUTPATIENT
Start: 2024-10-17 | End: 2024-10-18

## 2024-10-17 RX ORDER — MORPHINE SULFATE 30 MG/1
5 TABLET, EXTENDED RELEASE ORAL EVERY 4 HOURS
Refills: 0 | Status: DISCONTINUED | OUTPATIENT
Start: 2024-10-17 | End: 2024-10-21

## 2024-10-17 RX ORDER — HYDROMORPHONE HCL/0.9% NACL/PF 6 MG/30 ML
1 PATIENT CONTROLLED ANALGESIA SYRINGE INTRAVENOUS EVERY 4 HOURS
Refills: 0 | Status: DISCONTINUED | OUTPATIENT
Start: 2024-10-17 | End: 2024-10-17

## 2024-10-17 RX ADMIN — Medication 2 TABLET(S): at 21:44

## 2024-10-17 RX ADMIN — Medication 25 MILLIGRAM(S): at 06:19

## 2024-10-17 RX ADMIN — Medication 128 MILLIGRAM(S): at 06:31

## 2024-10-17 RX ADMIN — HYDROMORPHONE HYDROCHLORIDE 1 MILLIGRAM(S): 1 INJECTION, SOLUTION INTRAMUSCULAR; INTRAVENOUS; SUBCUTANEOUS at 02:05

## 2024-10-17 RX ADMIN — PRAMIPEXOLE DIHYDROCHLORIDE 1 MILLIGRAM(S): 0.5 TABLET ORAL at 06:19

## 2024-10-17 RX ADMIN — HYDROMORPHONE HYDROCHLORIDE 1 MILLIGRAM(S): 1 INJECTION, SOLUTION INTRAMUSCULAR; INTRAVENOUS; SUBCUTANEOUS at 07:16

## 2024-10-17 RX ADMIN — Medication 5 MILLIGRAM(S): at 09:30

## 2024-10-17 RX ADMIN — Medication 25 MILLIGRAM(S): at 18:18

## 2024-10-17 RX ADMIN — MORPHINE SULFATE 5 MILLIGRAM(S): 30 TABLET, EXTENDED RELEASE ORAL at 16:32

## 2024-10-17 RX ADMIN — Medication 6 MILLIGRAM(S): at 21:50

## 2024-10-17 RX ADMIN — MORPHINE SULFATE 5 MILLIGRAM(S): 30 TABLET, EXTENDED RELEASE ORAL at 22:32

## 2024-10-17 RX ADMIN — MORPHINE SULFATE 5 MILLIGRAM(S): 30 TABLET, EXTENDED RELEASE ORAL at 23:02

## 2024-10-17 RX ADMIN — PREGABALIN 100 MILLIGRAM(S): 25 CAPSULE ORAL at 06:19

## 2024-10-17 RX ADMIN — HYDROMORPHONE HYDROCHLORIDE 1 MILLIGRAM(S): 1 INJECTION, SOLUTION INTRAMUSCULAR; INTRAVENOUS; SUBCUTANEOUS at 07:45

## 2024-10-17 RX ADMIN — PRAMIPEXOLE DIHYDROCHLORIDE 3.75 MILLIGRAM(S): 0.12 TABLET ORAL at 22:25

## 2024-10-17 RX ADMIN — Medication 5 MILLIGRAM(S): at 02:12

## 2024-10-17 RX ADMIN — PREGABALIN 100 MILLIGRAM(S): 150 CAPSULE ORAL at 21:44

## 2024-10-17 RX ADMIN — Medication 1000 MILLIGRAM(S): at 06:19

## 2024-10-17 RX ADMIN — Medication 40 MILLIGRAM(S): at 18:18

## 2024-10-17 RX ADMIN — CYCLOBENZAPRINE HYDROCHLORIDE 5 MILLIGRAM(S): 30 CAPSULE, EXTENDED RELEASE ORAL at 22:25

## 2024-10-17 RX ADMIN — Medication 40 MILLIGRAM(S): at 21:45

## 2024-10-17 RX ADMIN — Medication 975 MILLIGRAM(S): at 21:44

## 2024-10-17 RX ADMIN — Medication 975 MILLIGRAM(S): at 22:14

## 2024-10-17 NOTE — H&P ADULT - HISTORY OF PRESENT ILLNESS
74 F with PMHx of OA, HTN, gastroparesis, peripheral neuropathy, multiple prior thoracolumbar spine surgeries for congenital scoliosis [place]/ [place] of hardware (1991) presents to [facility] on 9/28/24  with back pain and left hip pain s/p mechanical fall.  CT imaging shows T12 three column spine fracture-malalignment with fracture extending to the adjacent right 12th posterior rib and left T12-L1 facet joint and acute, displaced fracture of the left eighth lateral rib. Chronic appearing bilateral rib deformities. S/p T9-L3 open fusion with PSO/lag screw partial corpectomy/interbody with complex plastic closure ( w/ Dr. Stroud and Dr. Tinajero on 9/29)  Post op course c/b CSK leak s/p repair, new onset  paroxsymal Afib with RVR,  increased pain and drainage from surgical incision, development of pleural effusions, symptomatic orthostatic hypotension, urinary retention,  post op anemia, and chest pain with spontaneous resolution. Cardiac work up negative. Patient optimized andwas evaluated by PM&R and therapy for functional deficits, gait/ADL impairments and acute rehabilitation was recommended. Patient was cleared for discharge to [facility] IRU on 10/16/24. Mrs. Haley French is a 74-year-old female patient with past medical history of OA, HTN, gastroparesis, peripheral neuropathy, multiple prior thoracolumbar spine surgeries for congenital scoliosis done >10 years ago [place] of [place] w/ removal of hardware (1991) who presented to [facility] on 9/28/24 with back pain and left hip pain s/p mechanical fall. CT imaging was performed and reported a T12 three column spine fracture-malalignment with fracture extending to the adjacent right 12th posterior rib and left T12-L1 facet joint and acute, displaced fracture of the left eighth lateral rib. Chronic appearing bilateral rib deformities. Surgical management was recommended and she is S/P T9-L3 open fusion with PSO/lag screw partial corpectomy/interbody by Dr. Stroud, Neurosurgeon with complex Plastic Surgery closure by Dr. Elias on 9/29/24. Post op course c/b CSK leak s/p repair, new onset paroxsymal Afib with RVR,  increased pain and drainage from surgical incision, development of pleural effusions, symptomatic orthostatic hypotension, urinary retention, post op anemia, and chest pain with spontaneous resolution. Cardiac work up negative. Patient was evaluated by PM&R and therapy for functional deficits, gait/ADL impairments and acute rehabilitation was recommended. Patient was cleared for discharge to [facility] IRF on 10/16/24.

## 2024-10-17 NOTE — H&P ADULT - NS ATTEND AMEND GEN_ALL_CORE FT
I have personally seen and examined the patient with the Nurse Practitioner. Medical records reviewed. I have made amendments to the documentation where necessary and adjusted the history, physical examination, and plan as documented by the Nurse Practitioner and Resident Physician.

## 2024-10-17 NOTE — H&P ADULT - NSHPREVIEWOFSYSTEMS_GEN_ALL_CORE
REVIEW OF SYSTEMS:    CONSTITUTIONAL: Denies weakness, fevers or chills.  EYES/ENT: Denies visual changes  NECK: Denies pain or stiffness  RESPIRATORY: Denies cough, wheezing, hemoptysis or shortness of breath  CARDIOVASCULAR: Denies chest pain or palpitations  GASTROINTESTINAL: Denies abdominal or epigastric pain, nausea, vomiting, diarrhea or constipation  GENITOURINARY: Denies dysuria, frequency or hematuria  NEUROLOGICAL: Denies numbness or weakness  SKIN: Denies itching, rashes CONSTITUTIONAL: Denies weakness, fevers or chills.  EYES/ENT: Denies visual changes  NECK: Denies pain or stiffness  RESPIRATORY: Denies cough, wheezing, hemoptysis or shortness of breath  CARDIOVASCULAR: Denies chest pain or palpitations  GASTROINTESTINAL: Denies abdominal or epigastric pain, nausea, vomiting, diarrhea or constipation  GENITOURINARY: Denies dysuria, frequency or hematuria  NEUROLOGICAL: Denies numbness or weakness  SKIN: Denies itching, rashes

## 2024-10-17 NOTE — H&P ADULT - NSHPPHYSICALEXAM_GEN_ALL_CORE
Gen - NAD, Comfortable  HEENT - NCAT, EOMI, MMM  Neck - Supple, No limited ROM  Pulm - CTAB, No wheeze, No rhonchi, No crackles  Cardiovascular - RRR  Abdomen - Soft, mobile, firm mass in RLQ, mild TTP. +BS throughout all quadrants  Extremities - No C/C/E. No calf tenderness bl.  Neuro-     Cognitive - AAOx3     Communication - Fluent, No dysarthria     Motor -                     LEFT    UE - ShAB 5/5, EF 5/5, EE 5/5, WE 5/5,  5/5                    RIGHT UE - ShAB 5/5, EF 5/5, EE 5/5, WE 5/5,  5/5                    LEFT    LE - HF 5/5, KE 4/5, DF 5/5, PF 5/5                    RIGHT LE - HF 5/5, KE 4/5, DF 5/5, PF 5/5        Sensory - Intact to LT in bl UEs and LEs  Psychiatric - Mood stable, Affect WNL  Skin:  24 cm incision along lower midline of back. Clean, dry, intact. No drainage or skin breakdown. No pressure injuries

## 2024-10-17 NOTE — PATIENT PROFILE ADULT - FALL HARM RISK - HARM RISK INTERVENTIONS
Assistance with ambulation/Assistance OOB with selected safe patient handling equipment/Communicate Risk of Fall with Harm to all staff/Discuss with provider need for PT consult/Monitor gait and stability/Provide patient with walking aids - walker, cane, crutches/Reinforce activity limits and safety measures with patient and family/Sit up slowly, dangle for a short time, stand at bedside before walking/Tailored Fall Risk Interventions/Use of alarms - bed, chair and/or voice tab/Visual Cue: Yellow wristband and red socks/Bed in lowest position, wheels locked, appropriate side rails in place/Call bell, personal items and telephone in reach/Instruct patient to call for assistance before getting out of bed or chair/Non-slip footwear when patient is out of bed/[place] to call system/Physically safe environment - no spills, clutter or unnecessary equipment/Purposeful Proactive Rounding/Room/bathroom lighting operational, light cord in reach

## 2024-10-17 NOTE — H&P ADULT - NSICDXFAMILYHX_GEN_ALL_CORE_FT
FAMILY HISTORY:  Mother  Still living? Unknown  Family history of abdominal aortic aneurysm (AAA), Age at diagnosis: Age Unknown    
FAMILY HISTORY:  Mother  Still living? Unknown  Family history of abdominal aortic aneurysm (AAA), Age at diagnosis: Age Unknown

## 2024-10-17 NOTE — H&P ADULT - NSHPSOCIALHISTORY_GEN_ALL_CORE
SOCIAL HISTORY  Smoking -   EtOH -   Drugs -     FUNCTIONAL HISTORY  Patient lives with her son in an elevator accessible apartment building with no stairs to enter. PTA, pt. was independent in ADLs & mobility with RW. As per patient, her son is a special child & requires assistance.    CURRENT FUNCTIONAL STATUS  - Bed Mobility: Min A; 1PA; bed rails  - Transfers: Min A; 1PA; FWB; RW; TLSO brace OOB  - Gait: Min A; 1PA; FWB; 5 feet with RW; TLSO brace OOB  - ADLs:  -Lower Body Dressing: Max A; 1PA SOCIAL HISTORY  Smoking - 20 pack years (10 years 2 ppd); quit 40 years ago  EtOH - denies  Drugs - denies    FUNCTIONAL HISTORY  Patient lives with her son in an elevator accessible apartment building with no stairs to enter. PTA, pt. was independent in ADLs & mobility with RW. As per patient, her son is a special child & requires assistance.    CURRENT FUNCTIONAL STATUS  - Bed Mobility: Min A; 1PA; bed rails  - Transfers: Min A; 1PA; FWB; RW; TLSO brace OOB  - Gait: Min A; 1PA; FWB; 5 feet with RW; TLSO brace OOB  - ADLs:  -Lower Body Dressing: Max A; 1PA

## 2024-10-17 NOTE — H&P ADULT - NSICDXPASTMEDICALHX_GEN_ALL_CORE_FT
PAST MEDICAL HISTORY:  2019 novel coronavirus disease (COVID-19) Dec 2020- hospitalized for 3 days, did not require home O2, denies residual symptoms    Depression     Essential hypertension     Fungal infection of skin under breast    GERD (gastroesophageal reflux disease)     H/O scoliosis     H/O seasonal allergies     Hiatal hernia     History of chronic pain     History of pulmonary embolism developed after billings abigail surgery,1984 treated with coumadin 3 months, no issues after    OA (osteoarthritis)     Osteoporosis     Peripheral neuropathy     Restless leg syndrome     Right hip pain     
PAST MEDICAL HISTORY:  2019 novel coronavirus disease (COVID-19) Dec 2020- hospitalized for 3 days, did not require home O2, denies residual symptoms    Depression     Essential hypertension     Fungal infection of skin under breast    GERD (gastroesophageal reflux disease)     H/O scoliosis     H/O seasonal allergies     Hiatal hernia     History of chronic pain     History of pulmonary embolism developed after billings abigail surgery,1984 treated with coumadin 3 months, no issues after    OA (osteoarthritis)     Osteoporosis     Peripheral neuropathy     Restless leg syndrome     Right hip pain

## 2024-10-17 NOTE — H&P ADULT - NSICDXPASTSURGICALHX_GEN_ALL_CORE_FT
PAST SURGICAL HISTORY:  H/O arthroscopy of knee June 2021    History of bilateral hip replacements     Removal of pin, plate, abigail, or screw 1991- removal of billings abigail    S/P cataract surgery     S/P dilatation and curettage 1981    S/P hip replacement left (2008)    S/P hysterectomy 1982    S/P repair of paraesophageal hernia 2001    S/P shoulder surgery right (2012)    S/P spinal fusion L4-L5 1966    S/P spinal surgery x 2 1985, 1986    Scoliosis s/p placement of billings abigail x 2 1984    
PAST SURGICAL HISTORY:  H/O arthroscopy of knee June 2021    History of bilateral hip replacements     Removal of pin, plate, abigail, or screw 1991- removal of billings abigail    S/P cataract surgery     S/P dilatation and curettage 1981    S/P hip replacement left (2008)    S/P hysterectomy 1982    S/P repair of paraesophageal hernia 2001    S/P shoulder surgery right (2012)    S/P spinal fusion L4-L5 1966    S/P spinal surgery x 2 1985, 1986    Scoliosis s/p placement of billings abigail x 2 1984

## 2024-10-17 NOTE — H&P ADULT - ASSESSMENT
VIRGINIA HUFFMAN is a 74 F with PMHx of OA, HTN, gastroparesis, peripheral neuropathy, multiple prior thoracolumbar spine surgeries for congenital scoliosis [place]/ [place] of hardware (1991) presents to [facility] on 9/28/24  with back pain and left hip pain s/p mechanical fall. Imaging shows acute nondisplaced fracture of T12 veterbral body extending into right pedicle w/o clear cord impingement. S/p T9-L3 open fusion with PSO/lag screw partial corpectomy/interbody with complex plastic closure ( w/ Dr. Stroud and Dr. Tinajero on 9/29).  Post op course c/b CSK leak s/p repair, new onset  paroxsymal Afib with RVR,  increased pain and drainage from surgical incision, development of pleural effusions, symptomatic orthostatic hypotension, urinary retention,  post op anemia, and chest pain with spontaneous resolution. Cardiac work up negative. Patient is now optimized and admitted to  after for initiation of a multidisciplinary rehab program consisting focused on functional mobility, transfers and ADLs.    #T12 vertebral fracture   - S/p T9-L3 open fusion with PSO/lag screw partial corpectomy/interbody with complex plastic closure ( w/ Dr. Stroud and Dr. Tinajero on 9/29)   -9/28 CT Lumbar spine- T12 three column spine fracture-malalignment with fracture extending to the adjacent right 12th posterior rib and left T12-L1 facet joint. and acute, displaced fracture of the left eighth lateral rib.  -10/5  CT Lumbar spine- Status post spinal fusion of T9-L3 with bilateral rods and screws and cylindrical cage within the fractured T12 vertebral body. Resection of the LEFT T12 pedicle and a LEFT hemilaminectomy. Hardware appears intact.   No evidence of hardware loosening. Expected postoperative changes.   -Spinal/fall precautions  -TLSO when OOB  -ICE PRN  -Pain management: Tylenol 975mg ATC, Duloxetine 20mg daily, Lyrica 100mg TID , Flexeril 5mg TID PRN      Comprehensive Multidisciplinary Rehab Program:  - Start comprehensive rehab program, 3 hours a day, 5 days a week.  - PT 2hr/day: Focused on improving strength, endurance, coordination, balance, functional mobility, and transfers  - OT 1hr/day: Focused on improving strength, fine motor skills, coordination, posture and ADLs.    - Activity Limitations: Decreased social, vocational and leisure activities, decreased self care and ADLs, decreased mobility, decreased ability to manage household and finances.     -----------------------------------------------------------------------------------  Concurrent Medical Problems    #Orthostatic hypotension  -Monitor daily  -S/p 1L IVF bolus (10/13) for symptomatic hypotension  -Losartan 100mg daily HOLD  -Chlorthalidone 25mg daily HOLD (may restart if SBP is consistently above 140 per cardio)  -S/p Midodrine     #Seasonal allergies/Nasal congestion  -Singulair 10mg daily   -[place]cean spray BID PRN    #HLD  -Atorvastatin 40mg HS    #Paroxysmal A-Fib  -TTE 61%  -S/p IV Lopressor x 1   -Metoprolol 25mg BID for rate control  -Monitor, if continue to be in A-fib, will need full dose AC per cardio    #Hyponatremia (resolved)  - Likely 2/2 SIADH in setting of pain and poor solute intake  - Liberalize fluid restriction in setting of soft BP  - Salt tabs 1g TID today  - Monitor Na (10/16 Na 138)    #Post op Anemia.   - S/p PRBCs intraoperatively   - Hb stable with no evidence of active bleed at this time.   - Monitor H/H (10/16 Hgb 9.5)    #SILVANA (obstructive sleep apnea).   · Trialed on RA bedside. when awake and speaking with me, maintains SpO2>92%, however several time she dozed off    during exam during which she would have transient desaturations.  - Nocturnal 2L O2 and PRN during day during naps  - F/u with PCP outpatient as may benefit from sleep study and CPAP.    #Pleural effusion   - 10/10 CT Abd/pelvis- neg for GI bleed  - S/p one dose of Lasix with significant output  - repeat CXR 10/15 with resolution of pleural effusions    #Liver cyst  -10/10 CT Abd/pelvis- Thereare is a large cyst, stable since prior exam. There are small hypodense foci on segment II and segment I too small to characterize, unchanged since prior.  -Outpatient f/u     #Restless Leg Syndrome  -Pramipexole 1mg daily/ 3.75mg HS    #Elevated alkaline phosphatase level (increasing)  -Outpatient f/u with PCP (10/16 Alk Phos 232)    #Mood/Cognition/Depression  Neuropsychology consult PRN  -Duloxetine 20mg daily    #Sleep:   Maintain quiet hours and low stim environment.  Melatonin 6mg HS to maximize participation in therapy during the day.     #GI/Bowel:  Senna QHS, Miralax PRN Daily  GI ppx: Pantoprazole 40mg daily, Voquenza 10mg daily , Eryped 128mg BID     #/Bladder/Urinary retention  - PVR q 8 hours (SC if > 400)  -Flomax 0.4mg HS     #Skin/Pressure Injury:   - Skin assessment on admission: ***    #Diet/Supplements  Current Diet: Regular  -Multivitamin daily   -Ergocalciferol 1.25mg weekly    #Precautions / PROPHYLAXIS:  - Falls,  Spinal  - ortho: Weight bearing status: WBAT, TLSO brace OOB   - Lungs: Incentive Spirometer   - DVT ppx: Lovenox 40mg daily   - Pressure injury/Skin:  OOB to Chair, PT/OT      ---------------  Code Status: FULL  Emergency Contact:    Outpatient Follow-up (Specialty/Name of physician):    Cait Watkins  Cardiovascular Disease  [address] [facility], [address] [place] [address]  Phone: [phone]  Fax: [phone]  Follow Up Time: 2 weeks    Eitan Stroud  Neurosurgery  [address] [place] [address], [address]  [place] [address]  Phone: [phone]  Fax: [phone]  Follow Up Time: 2 weeks    Abdoulaye Mercedes  [facility]  ORTHOSURG [address] [facility]  Scheduled Appointment: 10/22/2024    Angie Koch  [facility]  DERM [address]  Scheduled Appointment: 10/22/2024    Guille Lance  [facility]ician [facility]  ORTHOSURG [address] [facility]  Scheduled Appointment: 11/22/2024    Catalino Cotto  [facility]  WEIGHTMGMT  [facility]  Scheduled Appointment: 12/05/2024    [facility]  GASTRO [address] [facility]  Scheduled Appointment: 01/06/2025      --------------  Goals: Safe discharge to Home*****  Estimated Length of Stay: 10-14 days  Rehab Potential: Good  Medical Prognosis: Good  Estimated Disposition: Home with Home Care  ---------------    PRESCREEN COMPARISON:  I have reviewed the prescreen information and I have found no relevant changes between the preadmission screening and my post admission evaluation.    RATIONALE FOR INPATIENT ADMISSION: Patient demonstrates the following:  [X] Medically appropriate for rehabilitation admission  [X] Has attainable rehab goals with an appropriate initial discharge plan  [X]Has rehabilitation potential (expected to make a significant improvement within a reasonable period of time)  [X] Requires close medical management by a rehab physician, rehab nursing care, Hospitalist and comprehensive interdisciplinary team (including PT, OT, Prosthetics and Orthotics)   VIRGINIA HUFFMAN is a 74 F with PMHx of OA, HTN, gastroparesis, peripheral neuropathy, multiple prior thoracolumbar spine surgeries for congenital scoliosis done >[place] years ago [place] of [place] w/ removal of hardware (1991) presents to [facility] on 9/28/24  with back pain and left hip pain s/p mechanical fall. Imaging shows acute nondisplaced fracture of T12 veterbral body extending into right pedicle w/o clear cord impingement. S/p T9-L3 open fusion with PSO/lag screw partial corpectomy/interbody with complex plastic closure ( w/ Dr. Stroud and Dr. Tinajero on 9/29).  Post op course c/b CSK leak s/p repair, new onset  paroxsymal Afib with RVR,  increased pain and drainage from surgical incision, development of pleural effusions, symptomatic orthostatic hypotension, urinary retention,  post op anemia, and chest pain with spontaneous resolution. Cardiac work up negative. Patient is now optimized and admitted to [facility] after for initiation of a multidisciplinary rehab program consisting focused on functional mobility, transfers and ADLs.    #T12 vertebral fracture   - S/p T9-L3 open fusion with PSO/lag screw partial corpectomy/interbody with complex plastic closure ( w/ Dr. Stroud and Dr. Tinajero on 9/29)   -9/28 CT Lumbar spine- T12 three column spine fracture-malalignment with fracture extending to the adjacent right 12th posterior rib and left T12-L1 facet joint. and acute, displaced fracture of the left eighth lateral rib.  -10/5  CT Lumbar spine- Status post spinal fusion of T9-L3 with bilateral rods and screws and cylindrical cage within the fractured T12 vertebral body. Resection of the LEFT T12 pedicle and a LEFT hemilaminectomy. Hardware appears intact.   No evidence of hardware loosening. Expected postoperative changes.   -Spinal/fall precautions  -TLSO when OOB  -ICE PRN  -Pain management: Tylenol 975mg ATC, Duloxetine 20mg daily, Lyrica 100mg TID , Flexeril 5mg TID PRN      Comprehensive Multidisciplinary Rehab Program:  - Start comprehensive rehab program, 3 hours a day, 5 days a week.  - PT 2hr/day: Focused on improving strength, endurance, coordination, balance, functional mobility, and transfers  - OT 1hr/day: Focused on improving strength, fine motor skills, coordination, posture and ADLs.    - Activity Limitations: Decreased social, vocational and leisure activities, decreased self care and ADLs, decreased mobility, decreased ability to manage household and finances.     -----------------------------------------------------------------------------------  Concurrent Medical Problems    #Orthostatic hypotension  -Monitor daily  -S/p 1L IVF bolus (10/13) for symptomatic hypotension  -Losartan 100mg daily HOLD  -Chlorthalidone 25mg daily HOLD (may restart if SBP is consistently above 140 per cardio)  -S/p Midodrine     #Seasonal allergies/Nasal congestion  -Singulair 10mg daily   -[place] spray BID PRN    #HLD  -Atorvastatin 40mg HS    #Paroxysmal A-Fib  -TTE 61%  -S/p IV Lopressor x 1   -Metoprolol 25mg BID for rate control  -Monitor, if continue to be in A-fib, will need full dose AC per cardio    #Hyponatremia (resolved)  - Likely 2/2 SIADH in setting of pain and poor solute intake  - Liberalize fluid restriction in setting of soft BP  - Salt tabs 1g TID today  - Monitor Na (10/16 Na 138)    #Post op Anemia.   - S/p PRBCs intraoperatively   - Hb stable with no evidence of active bleed at this time.   - Monitor H/H (10/16 Hgb 9.5)    #SILVANA (obstructive sleep apnea).   · Trialed on RA bedside. when awake and speaking with me, maintains SpO2>92%, however several time she dozed off    during exam during which she would have transient desaturations.  - Nocturnal 2L O2 and PRN during day during naps  - F/u with PCP outpatient as may benefit from sleep study and CPAP.    #Pleural effusion   - 10/10 CT Abd/pelvis- neg for GI bleed  - S/p one dose of Lasix with significant output  - repeat CXR 10/15 with resolution of pleural effusions    #Liver cyst  -10/10 CT Abd/pelvis- Thereare is a large cyst, stable since prior exam. There are small hypodense foci on segment II and segment I too small to characterize, unchanged since prior.  -Outpatient f/u     #Restless Leg Syndrome  -Pramipexole 1mg daily/ 3.75mg HS    #Elevated alkaline phosphatase level (increasing)  -Outpatient f/u with PCP (10/16 Alk Phos 232)    #Mood/Cognition/Depression  Neuropsychology consult PRN  -Duloxetine 20mg daily    #Sleep:   Maintain quiet hours and low stim environment.  Melatonin 6mg HS to maximize participation in therapy during the day.     #GI/Bowel:  Senna QHS, Miralax PRN Daily  GI ppx: Pantoprazole 40mg daily, Voquenza 10mg daily , Eryped 128mg BID     #/Bladder/Urinary retention  - PVR q 8 hours (SC if > 400)  -Flomax 0.4mg HS     #Skin/Pressure Injury:   - Skin assessment on admission: Midline surgical incision c/d/i. No pressure injuries.    #Diet/Supplements  Current Diet: Regular  -Multivitamin daily   -Ergocalciferol 1.25mg weekly    #Precautions / PROPHYLAXIS:  - Falls,  Spinal  - ortho: Weight bearing status: WBAT, TLSO brace OOB   - Lungs: Incentive Spirometer   - DVT ppx: Lovenox 40mg daily   - Pressure injury/Skin:  OOB to Chair, PT/OT      ---------------  Code Status: FULL  Emergency Contact:    Outpatient Follow-up (Specialty/Name of physician):    Cait Watkins  Cardiovascular Disease  [address] [facility], [address] [place] [address]  Phone: [phone]  Fax: [phone]  Follow Up Time: 2 weeks    Eitan Stroud  Neurosurgery  [address] [facility], [address] 3  [place] [address]  Phone: [phone]  Fax: [phone]  Follow Up Time: 2 weeks    Abdoulaye Mercedes  [facility]  ORTHOSURG [address] [facility]  Scheduled Appointment: 10/22/2024    Angie Koch  [facility]  DERM [address]  Scheduled Appointment: 10/22/2024    Guille Lance  [facility]  ORTHOSURG [address] [facility]  Scheduled Appointment: 11/22/2024    Catalino Cotto  [facility]  WEIGHTMGMT  [facility]  Scheduled Appointment: 12/05/2024    BernsteinHaydee meyer  [facility]  GASTRO [address] [facility]  Scheduled Appointment: 01/06/2025      --------------  Goals: Safe discharge to Home  Estimated Length of Stay: 10-14 days  Rehab Potential: Good  Medical Prognosis: Good  Estimated Disposition: Home with Home Care  ---------------    PRESCREEN COMPARISON:  I have reviewed the prescreen information and I have found no relevant changes between the preadmission screening and my post admission evaluation.    RATIONALE FOR INPATIENT ADMISSION: Patient demonstrates the following:  [X] Medically appropriate for rehabilitation admission  [X] Has attainable rehab goals with an appropriate initial discharge plan  [X]Has rehabilitation potential (expected to make a significant improvement within a reasonable period of time)  [X] Requires close medical management by a rehab physician, rehab nursing care, Hospitalist and comprehensive interdisciplinary team (including PT, OT, Prosthetics and Orthotics)   Mrs. Haley French is a 74-year-old female patient with past medical history of OA, HTN, gastroparesis, peripheral neuropathy, multiple prior thoracolumbar spine surgeries for congenital scoliosis [place] >[place] of [place] w/ removal of hardware (1991) who presented to [facility] on 9/28/24 with back pain and left hip pain s/p mechanical fall. CT imaging was performed and reported a T12 three column spine fracture-malalignment with fracture extending to the adjacent right 12th posterior rib and left T12-L1 facet joint and acute, displaced fracture of the left eighth lateral rib. Chronic appearing bilateral rib deformities. Surgical management was recommended and she is S/P T9-L3 open fusion with PSO/lag screw partial corpectomy/interbody by Dr. Stroud, Neurosurgeon with complex Plastic Surgery closure by Dr. Elias on 9/29/24. Post op course c/b CSK leak s/p repair, new onset paroxsymal Afib with RVR,  increased pain and drainage from surgical incision, development of pleural effusions, symptomatic orthostatic hypotension, urinary retention, post op anemia, and chest pain with spontaneous resolution. Cardiac work up negative. Patient was evaluated by PM&R and therapy for functional deficits, gait/ADL impairments and acute rehabilitation was recommended. Patient was cleared for discharge to [facility]F on 10/16/24.    #Traumatic T11-12 vertebral fracture s/p corpectomy and fusion surgery   - S/P T9-L3 open fusion with PSO/lag screw partial corpectomy/interbody with complex plastic closure ( w/ Dr. Stroud and Dr. Tinajero on 9/29)       * CT Lumbar Spine (9/28/24): T12 three column spine fracture-malalignment with fracture extending to the adjacent right 12th posterior rib and left T12-L1 facet joint. and acute displaced fracture of the left eighth lateral rib.      * CT Lumbar Spine (10/5/24): Status post spinal fusion of T9-L3 with bilateral rods and screws and cylindrical cage within the fractured T12 vertebral body. Resection of the LEFT T12 pedicle and a LEFT hemilaminectomy. Hardware appears intact. No evidence of hardware loosening. Expected postoperative changes.       * Spinal/fall precautions      * TLSO when OOB      * ICE PRN      * Pain management: Tylenol 975mg ATC, Duloxetine 20mg daily, Lyrica 100mg TID , Flexeril 5mg TID PRN  - Activity Limitations: Decreased social, vocational and leisure activities, decreased self care and ADLs, decreased mobility, decreased ability to manage household and finances.   - Comprehensive Multidisciplinary Rehab Program:      * 3 hours a day, 5 days a week.      * PT 2hr/day: Focused on improving strength, endurance, coordination, balance, functional mobility, and transfers      * OT 1hr/day: Focused on improving strength, fine motor skills, coordination, posture and ADLs.      -----------------------------------------------------------------------------------  Concurrent Medical Problems    #Orthostatic hypotension  - Monitor daily  - S/P 1L IVF bolus (10/13) for symptomatic hypotension  - Losartan 100mg daily HOLD  - Chlorthalidone 25mg daily HOLD (may restart if SBP is consistently above 140 per cardio)  - S/p Midodrine     #Seasonal allergies/Nasal congestion  - Singulair 10mg daily   - [place] spray BID PRN    #HLD  - Atorvastatin 40mg HS    #Paroxysmal A-Fib  - TTE 61%  - S/p IV Lopressor x 1   - Metoprolol 25mg BID for rate control  - Monitor, if continue to be in A-fib, will need full dose AC per cardio    #Hyponatremia (resolved)  - Likely 2/2 SIADH in setting of pain and poor solute intake  - Liberalize fluid restriction in setting of soft BP  - Salt tabs 1g TID today  - Monitor Na (10/16 Na 138)    #Post op Anemia.   - S/p PRBCs intraoperatively   - Hb stable with no evidence of active bleed at this time.   - Monitor H/H (10/16 Hgb 9.5)    #SILVANA (obstructive sleep apnea).   · Trialed on RA bedside. when awake and speaking with me, maintains SpO2>92%, however several time she dozed off    during exam during which she would have transient desaturations.  - Nocturnal 2L O2 and PRN during day during naps  - F/u with PCP outpatient as may benefit from sleep study and CPAP.    #Pleural effusion   - 10/10 CT Abd/pelvis- neg for GI bleed  - S/p one dose of Lasix with significant output  - repeat CXR 10/15 with resolution of pleural effusions    #Liver cyst  -10/10 CT Abd/pelvis- Thereare is a large cyst, stable since prior exam. There are small hypodense foci on segment II and segment I too small to characterize, unchanged since prior.  -Outpatient f/u     #Restless Leg Syndrome  -Pramipexole 1mg daily/ 3.75mg HS    #Elevated alkaline phosphatase level (increasing)  -Outpatient f/u with PCP (10/16 Alk Phos 232)    #Mood/Cognition/Depression  Neuropsychology consult PRN  -Duloxetine 20mg daily    #Sleep:   Maintain quiet hours and low stim environment.  Melatonin 6mg HS to maximize participation in therapy during the day.     #GI/Bowel:  Senna QHS, Miralax PRN Daily  GI ppx: Pantoprazole 40mg daily, Voquenza 10mg daily , Eryped 128mg BID     #/Bladder/Urinary retention  - PVR q 8 hours (SC if > 400)  -Flomax 0.4mg HS     #Skin/Pressure Injury:   - Skin assessment on admission: Midline surgical incision c/d/i. No pressure injuries.    #Diet/Supplements  Current Diet: Regular  -Multivitamin daily   -Ergocalciferol 1.25mg weekly    #Precautions / PROPHYLAXIS:  - Falls,  Spinal  - ortho: Weight bearing status: WBAT, TLSO brace OOB   - Lungs: Incentive Spirometer   - DVT ppx: Lovenox 40mg daily   - Pressure injury/Skin:  OOB to Chair, PT/OT      ---------------  Code Status: FULL  Emergency Contact:    Outpatient Follow-up (Specialty/Name of physician):    Cait Watkins  Cardiovascular Disease  [address], [address] [place] [id number]  Phone: [phone]  Fax: [phone]  Follow Up Time: 2 weeks    Eitan Stroud  Neurosurgery  [address] [facility], Floor 3  [place] [address]  Phone: [phone]  Fax: [phone]  Follow Up Time: 2 weeks    Daubs, Abdoulaye  [facility]  ORTHOSURG [address] [facility]  Scheduled Appointment: 10/22/2024    Angie Koch  [facility]  DERM [address]  Scheduled Appointment: 10/22/2024    Guille Lance  [facility]  ORTHOSURG [address] [facility]  Scheduled Appointment: 11/22/2024    Yadiel Catalino  [facility]  WEIGHTMGMT  [facility]  Scheduled Appointment: 12/05/2024    BernsteinHaydee meyer  [facility]  GASTRO [address] [facility]  Scheduled Appointment: 01/06/2025      --------------  Goals: Safe discharge to Home  Estimated Length of Stay: 10-14 days  Rehab Potential: Good  Medical Prognosis: Good  Estimated Disposition: Home with Home Care  ---------------    PRESCREEN COMPARISON:  I have reviewed the prescreen information and I have found no relevant changes between the preadmission screening and my post admission evaluation.    RATIONALE FOR INPATIENT ADMISSION: Patient demonstrates the following:  [X] Medically appropriate for rehabilitation admission  [X] Has attainable rehab goals with an appropriate initial discharge plan  [X]Has rehabilitation potential (expected to make a significant improvement within a reasonable period of time)  [X] Requires close medical management by a rehab physician, rehab nursing care, Hospitalist and comprehensive interdisciplinary team (including PT, OT, Prosthetics and Orthotics)

## 2024-10-18 LAB
ALBUMIN SERPL ELPH-MCNC: 3 G/DL — LOW (ref 3.3–5)
ALP SERPL-CCNC: 247 U/L — HIGH (ref 40–120)
ALT FLD-CCNC: 60 U/L — HIGH (ref 10–45)
ANION GAP SERPL CALC-SCNC: 9 MMOL/L — SIGNIFICANT CHANGE UP (ref 5–17)
AST SERPL-CCNC: 35 U/L — SIGNIFICANT CHANGE UP (ref 10–40)
BASOPHILS # BLD AUTO: 0.06 K/UL — SIGNIFICANT CHANGE UP (ref 0–0.2)
BASOPHILS NFR BLD AUTO: 1 % — SIGNIFICANT CHANGE UP (ref 0–2)
BILIRUB SERPL-MCNC: 0.4 MG/DL — SIGNIFICANT CHANGE UP (ref 0.2–1.2)
BUN SERPL-MCNC: 29 MG/DL — HIGH (ref 7–23)
CALCIUM SERPL-MCNC: 8.5 MG/DL — SIGNIFICANT CHANGE UP (ref 8.4–10.5)
CHLORIDE SERPL-SCNC: 98 MMOL/L — SIGNIFICANT CHANGE UP (ref 96–108)
CO2 SERPL-SCNC: 30 MMOL/L — SIGNIFICANT CHANGE UP (ref 22–31)
CREAT SERPL-MCNC: 0.69 MG/DL — SIGNIFICANT CHANGE UP (ref 0.5–1.3)
EGFR: 90 ML/MIN/1.73M2 — SIGNIFICANT CHANGE UP
EOSINOPHIL # BLD AUTO: 0.19 K/UL — SIGNIFICANT CHANGE UP (ref 0–0.5)
EOSINOPHIL NFR BLD AUTO: 3.2 % — SIGNIFICANT CHANGE UP (ref 0–6)
GLUCOSE SERPL-MCNC: 127 MG/DL — HIGH (ref 70–99)
HCT VFR BLD CALC: 30.7 % — LOW (ref 34.5–45)
HGB BLD-MCNC: 9.7 G/DL — LOW (ref 11.5–15.5)
IMM GRANULOCYTES NFR BLD AUTO: 0.7 % — SIGNIFICANT CHANGE UP (ref 0–0.9)
LYMPHOCYTES # BLD AUTO: 1.33 K/UL — SIGNIFICANT CHANGE UP (ref 1–3.3)
LYMPHOCYTES # BLD AUTO: 22.7 % — SIGNIFICANT CHANGE UP (ref 13–44)
MCHC RBC-ENTMCNC: 28.2 PG — SIGNIFICANT CHANGE UP (ref 27–34)
MCHC RBC-ENTMCNC: 31.6 GM/DL — LOW (ref 32–36)
MCV RBC AUTO: 89.2 FL — SIGNIFICANT CHANGE UP (ref 80–100)
MONOCYTES # BLD AUTO: 0.5 K/UL — SIGNIFICANT CHANGE UP (ref 0–0.9)
MONOCYTES NFR BLD AUTO: 8.5 % — SIGNIFICANT CHANGE UP (ref 2–14)
NEUTROPHILS # BLD AUTO: 3.75 K/UL — SIGNIFICANT CHANGE UP (ref 1.8–7.4)
NEUTROPHILS NFR BLD AUTO: 63.9 % — SIGNIFICANT CHANGE UP (ref 43–77)
NRBC # BLD: 0 /100 WBCS — SIGNIFICANT CHANGE UP (ref 0–0)
PLATELET # BLD AUTO: 489 K/UL — HIGH (ref 150–400)
POTASSIUM SERPL-MCNC: 3.3 MMOL/L — LOW (ref 3.5–5.3)
POTASSIUM SERPL-SCNC: 3.3 MMOL/L — LOW (ref 3.5–5.3)
PROT SERPL-MCNC: 6.6 G/DL — SIGNIFICANT CHANGE UP (ref 6–8.3)
RBC # BLD: 3.44 M/UL — LOW (ref 3.8–5.2)
RBC # FLD: 15.4 % — HIGH (ref 10.3–14.5)
SODIUM SERPL-SCNC: 137 MMOL/L — SIGNIFICANT CHANGE UP (ref 135–145)
WBC # BLD: 5.87 K/UL — SIGNIFICANT CHANGE UP (ref 3.8–10.5)
WBC # FLD AUTO: 5.87 K/UL — SIGNIFICANT CHANGE UP (ref 3.8–10.5)

## 2024-10-18 PROCEDURE — 99223 1ST HOSP IP/OBS HIGH 75: CPT

## 2024-10-18 RX ORDER — POTASSIUM CHLORIDE 10 MEQ
40 TABLET, EXTENDED RELEASE ORAL EVERY 4 HOURS
Refills: 0 | Status: DISCONTINUED | OUTPATIENT
Start: 2024-10-18 | End: 2024-10-18

## 2024-10-18 RX ORDER — MIDODRINE HYDROCHLORIDE 2.5 MG/1
10 TABLET ORAL
Refills: 0 | Status: DISCONTINUED | OUTPATIENT
Start: 2024-10-18 | End: 2024-10-19

## 2024-10-18 RX ORDER — METOPROLOL TARTRATE 50 MG
12.5 TABLET ORAL EVERY 12 HOURS
Refills: 0 | Status: DISCONTINUED | OUTPATIENT
Start: 2024-10-18 | End: 2024-10-23

## 2024-10-18 RX ORDER — POTASSIUM CHLORIDE 10 MEQ
40 TABLET, EXTENDED RELEASE ORAL EVERY 4 HOURS
Refills: 0 | Status: COMPLETED | OUTPATIENT
Start: 2024-10-18 | End: 2024-10-18

## 2024-10-18 RX ADMIN — Medication 40 MILLIGRAM(S): at 21:25

## 2024-10-18 RX ADMIN — MONTELUKAST SODIUM 10 MILLIGRAM(S): 10 TABLET, FILM COATED ORAL at 11:43

## 2024-10-18 RX ADMIN — Medication 975 MILLIGRAM(S): at 05:58

## 2024-10-18 RX ADMIN — PRAMIPEXOLE DIHYDROCHLORIDE 3.75 MILLIGRAM(S): 0.12 TABLET ORAL at 21:27

## 2024-10-18 RX ADMIN — PREGABALIN 100 MILLIGRAM(S): 150 CAPSULE ORAL at 21:26

## 2024-10-18 RX ADMIN — Medication 975 MILLIGRAM(S): at 05:28

## 2024-10-18 RX ADMIN — Medication 6 MILLIGRAM(S): at 21:26

## 2024-10-18 RX ADMIN — Medication 1 TABLET(S): at 11:43

## 2024-10-18 RX ADMIN — CYCLOBENZAPRINE HYDROCHLORIDE 5 MILLIGRAM(S): 30 CAPSULE, EXTENDED RELEASE ORAL at 22:32

## 2024-10-18 RX ADMIN — Medication 975 MILLIGRAM(S): at 14:51

## 2024-10-18 RX ADMIN — Medication 12.5 MILLIGRAM(S): at 17:39

## 2024-10-18 RX ADMIN — Medication 975 MILLIGRAM(S): at 21:26

## 2024-10-18 RX ADMIN — PRAMIPEXOLE DIHYDROCHLORIDE 1 MILLIGRAM(S): 0.12 TABLET ORAL at 05:29

## 2024-10-18 RX ADMIN — Medication 2 TABLET(S): at 21:28

## 2024-10-18 RX ADMIN — PREGABALIN 100 MILLIGRAM(S): 150 CAPSULE ORAL at 14:51

## 2024-10-18 RX ADMIN — PREGABALIN 100 MILLIGRAM(S): 150 CAPSULE ORAL at 05:30

## 2024-10-18 RX ADMIN — Medication 128 MILLIGRAM(S): at 17:34

## 2024-10-18 RX ADMIN — POLYETHYLENE GLYCOL 3350 17 GRAM(S): 17 POWDER, FOR SOLUTION ORAL at 05:29

## 2024-10-18 RX ADMIN — Medication 975 MILLIGRAM(S): at 21:56

## 2024-10-18 RX ADMIN — Medication 40 MILLIEQUIVALENT(S): at 22:33

## 2024-10-18 RX ADMIN — MORPHINE SULFATE 5 MILLIGRAM(S): 30 TABLET, EXTENDED RELEASE ORAL at 06:06

## 2024-10-18 RX ADMIN — POLYETHYLENE GLYCOL 3350 17 GRAM(S): 17 POWDER, FOR SOLUTION ORAL at 17:37

## 2024-10-18 RX ADMIN — Medication 40 MILLIEQUIVALENT(S): at 17:35

## 2024-10-18 RX ADMIN — MORPHINE SULFATE 5 MILLIGRAM(S): 30 TABLET, EXTENDED RELEASE ORAL at 05:36

## 2024-10-18 RX ADMIN — DULOXETINE HYDROCHLORIDE 20 MILLIGRAM(S): 30 CAPSULE, DELAYED RELEASE ORAL at 11:43

## 2024-10-18 RX ADMIN — CYCLOBENZAPRINE HYDROCHLORIDE 5 MILLIGRAM(S): 30 CAPSULE, EXTENDED RELEASE ORAL at 11:43

## 2024-10-18 RX ADMIN — Medication 40 MILLIGRAM(S): at 17:34

## 2024-10-18 NOTE — DIETITIAN INITIAL EVALUATION ADULT - PERTINENT LABORATORY DATA
10-18    137  |  98  |  29[H]  ----------------------------<  127[H]  3.3[L]   |  30  |  0.69    Ca    8.5      18 Oct 2024 09:12    TPro  6.6  /  Alb  3.0[L]  /  TBili  0.4  /  DBili  x   /  AST  35  /  ALT  60[H]  /  AlkPhos  247[H]  10-18  A1C with Estimated Average Glucose Result: 6.1 % (01-29-24 @ 00:38)

## 2024-10-18 NOTE — PROGRESS NOTE ADULT - ASSESSMENT
Mrs. Haley French is a 74-year-old female patient with past medical history of OA, HTN, gastroparesis, peripheral neuropathy, multiple prior thoracolumbar spine surgeries for congenital scoliosis [place]/ removal of hardware (1991) who presented to [facility] on 9/28/24 with back pain and left hip pain s/p mechanical fall. CT imaging was performed and reported a T12 three column spine fracture-malalignment with fracture extending to the adjacent right 12th posterior rib and left T12-L1 facet joint and acute, displaced fracture of the left eighth lateral rib. Chronic appearing bilateral rib deformities. Surgical management was recommended and she is S/P T9-L3 open fusion with PSO/lag screw partial corpectomy/interbody by Dr. Stroud, Neurosurgeon with complex Plastic Surgery closure by Dr. Elias on 9/29/24. Post op course c/b CSK leak s/p repair, new onset paroxsymal Afib with RVR,  increased pain and drainage from surgical incision, development of pleural effusions, symptomatic orthostatic hypotension, urinary retention, post op anemia, and chest pain with spontaneous resolution. Cardiac work up negative. Patient was evaluated by PM&R and therapy for functional deficits, gait/ADL impairments and acute rehabilitation was recommended. Patient was cleared for discharge to [facility] IRF on 10/16/24.    #Traumatic T11-12 vertebral fracture s/p corpectomy and fusion surgery   - S/P T9-L3 open fusion with PSO/lag screw partial corpectomy/interbody with complex plastic closure ( w/ Dr. Stroud and Dr. Tinajero on 9/29)   - CT Lumbar Spine (9/28/24): T12 three column spine fracture-malalignment with fracture extending to the adjacent right 12th posterior rib and left T12-L1 facet joint. and acute displaced fracture of the left eighth lateral rib.  - CT Lumbar Spine (10/5/24): Status post spinal fusion of T9-L3 with bilateral rods and screws and cylindrical cage within the fractured T12 vertebral body. Resection of the LEFT T12 pedicle and a LEFT hemilaminectomy.      Hardware appears intact. No evidence of hardware loosening. Expected postoperative changes.   - TLSO when OOB  - Pain management: Tylenol 975mg ATC, Duloxetine 20mg daily, Lyrica 100mg TID , Flexeril 5mg TID PRN  - pt/ot/dvt ppx/ pain meds      #Orthostatic hypotension  - Monitor daily  - S/P 1L IVF bolus (10/13) for symptomatic hypotension  - Losartan 100mg daily HOLD  - Chlorthalidone 25mg daily HOLD (may restart if SBP is consistently above 140 per cardio)  - decrease metoprolol to 12.5  - add midodrine since SBP 99  - S/p Midodrine     #Seasonal allergies/Nasal congestion  - Singulair     #HLD  - Atorvastatin     #Paroxysmal A-Fib  - TTE 61%  - S/p IV Lopressor x 1   - decreased Metoprolol to 12.5mg bid 10/18/24 due to low BP  - Monitor, if continue to be in A-fib, will need full dose AC per cardio    #Hyponatremia (resolved)  - Likely 2/2 SIADH in setting of pain and poor solute intake  - Liberalize fluid restriction in setting of soft BP  - Salt tabs 1g TID today  - Monitor Na (10/16 Na 138)    #Post op Anemia.   - S/p PRBCs intraoperatively   - Hb stable with no evidence of active bleed at this time.   - Monitor H/H (10/16 Hgb 9.5)    #SILVANA (obstructive sleep apnea).   - transient desaturations on RA    - c/w Nocturnal 2L O2 and PRN during day during naps, monitor sats   - F/u with PCP outpatient as may benefit from sleep study and CPAP.    #Pleural effusion   - 10/10 CT Abd/pelvis- neg for GI bleed  - S/p one dose of Lasix with significant output  - repeat CXR 10/15 with resolution of pleural effusions    #Liver cyst  -10/10 CT Abd/pelvis- There is a large cyst, stable since prior exam. There are small hypodense foci on segment II and segment I too small to characterize, unchanged since prior.  -Outpatient f/u     #Restless Leg Syndrome  -Pramipexole     #Elevated alkaline phosphatase level   - monitor     #Depression  -Duloxetine     # Gastritis-  Pantoprazole  Voquenza  , Eryped     #Urinary retention  - PVR q 8 hours (SC if > 400)  -Flomax     #DVT ppx: Lovenox     will follow  d/w rehab team   time spent in e/m visit 80 min

## 2024-10-18 NOTE — DIETITIAN INITIAL EVALUATION ADULT - OTHER INFO
Initial Nutrition Assessment   74yr Old Female   Denies Food Allergy/Intolerance  Tolerates Diet Well - No Chewing/Swallowing Complications (Per Patient)  Surgical Incision on Back & No Pressure Ulcers (as Per Nursing Flow Sheets)  No Edema Noted (as Per Nursing Flow Sheets)  No Recent Nausea/Vomiting/Diarrhea/Constipation (as Per Patient)

## 2024-10-18 NOTE — DIETITIAN INITIAL EVALUATION ADULT - FACTORS AFF FOOD INTAKE
States Good PO Intake/Appetite over Last Week  Denies Changes/Decrease in Meal Consumption (Per Patient)/none

## 2024-10-18 NOTE — DIETITIAN INITIAL EVALUATION ADULT - PERTINENT MEDS FT
MEDICATIONS  (STANDING):  acetaminophen     Tablet .. 975 milliGRAM(s) Oral every 8 hours  atorvastatin 40 milliGRAM(s) Oral at bedtime  calamine/zinc oxide Lotion 1 Application(s) Topical daily  DULoxetine 20 milliGRAM(s) Oral daily  enoxaparin Injectable 40 milliGRAM(s) SubCutaneous <User Schedule>  ergocalciferol 58298 Unit(s) Oral every week  erythromycin    ethylsuccinate Suspension 40 mG/mL 128 milliGRAM(s) Oral every 12 hours  melatonin 6 milliGRAM(s) Oral at bedtime  metoprolol tartrate 12.5 milliGRAM(s) Oral every 12 hours  midodrine. 10 milliGRAM(s) Oral <User Schedule>  montelukast 10 milliGRAM(s) Oral daily  multivitamin 1 Tablet(s) Oral daily  polyethylene glycol 3350 17 Gram(s) Oral two times a day  pramipexole 3.75 milliGRAM(s) Oral at bedtime  pramipexole 1 milliGRAM(s) Oral <User Schedule>  pregabalin 100 milliGRAM(s) Oral three times a day  senna 2 Tablet(s) Oral at bedtime  tamsulosin 0.4 milliGRAM(s) Oral at bedtime  Vonoprazan (Voquezna) 10mg tablet 1 Tablet(s) 1 Tablet(s) Oral daily    MEDICATIONS  (PRN):  cyclobenzaprine 5 milliGRAM(s) Oral three times a day PRN Muscle Spasm  diphenhydrAMINE 25 milliGRAM(s) Oral every 8 hours PRN Rash and/or Itching  morphine   Solution 5 milliGRAM(s) Oral every 4 hours PRN Severe Pain (7 - 10)  sodium chloride 0.65% Nasal 1 Spray(s) Both Nostrils two times a day PRN Nasal Congestion

## 2024-10-18 NOTE — PROGRESS NOTE ADULT - SUBJECTIVE AND OBJECTIVE BOX
HPI:  Mrs. Haley French is a 74-year-old female patient with past medical history of OA, HTN, gastroparesis, peripheral neuropathy, multiple prior thoracolumbar spine surgeries for congenital scoliosis [place] >[place] w/ removal of hardware (1991) who presented to [facility] on 9/28/24 with back pain and left hip pain s/p mechanical fall. CT imaging was performed and reported a T12 three column spine fracture-malalignment with fracture extending to the adjacent right 12th posterior rib and left T12-L1 facet joint and acute, displaced fracture of the left eighth lateral rib. Chronic appearing bilateral rib deformities. Surgical management was recommended and she is S/P T9-L3 open fusion with PSO/lag screw partial corpectomy/interbody by Dr. Stroud, Neurosurgeon with complex Plastic Surgery closure by Dr. Elias on 9/29/24. Post op course c/b CSK leak s/p repair, new onset paroxsymal Afib with RVR,  increased pain and drainage from surgical incision, development of pleural effusions, symptomatic orthostatic hypotension, urinary retention, post op anemia, and chest pain with spontaneous resolution. Cardiac work up negative. Patient was evaluated by PM&R and therapy for functional deficits, gait/ADL impairments and acute rehabilitation was recommended. Patient was cleared for discharge to [facility] IRF on 10/16/24. (17 Oct 2024 10:26)    ___________________________________________________________________________    SUBJECTIVE/ROS  Patient was seen and evaluated at bedside today.  Reported no overnight events and is in no acute distress.  Tolerated admission process and initial evaluations.  Reviewed weekend coverage with patient.  Patient expressed understanding and felt comfortable.  Eager to participate on the recommended rehabilitation program.  Denies any CP, SOB, POLLARD, palpitations, fever, chills, body aches, cough, congestion, or any other symptoms at this time.   ___________________________________________________________________________    VITALS  T(C): 36.9 (10-18-24 @ 08:42), Max: 37 (10-17-24 @ 20:06)  HR: 74 (10-18-24 @ 08:42) (74 - 99)  BP: 93/54 (10-18-24 @ 08:42) (93/54 - 134/71)  RR: 16 (10-18-24 @ 08:42) (15 - 16)  SpO2: 94% (10-18-24 @ 08:42) (94% - 97%)  ___________________________________________________________________________    LABS                          9.7    5.87  )-----------( 489      ( 18 Oct 2024 09:12 )             30.7       10-18    137  |  98  |  29[H]  ----------------------------<  127[H]  3.3[L]   |  30  |  0.69    Ca    8.5      18 Oct 2024 09:12    TPro  6.6  /  Alb  3.0[L]  /  TBili  0.4  /  DBili  x   /  AST  35  /  ALT  60[H]  /  AlkPhos  247[H]  10-18    ___________________________________________________________________________    MEDICATIONS  (STANDING):  acetaminophen     Tablet .. 975 milliGRAM(s) Oral every 8 hours  atorvastatin 40 milliGRAM(s) Oral at bedtime  calamine/zinc oxide Lotion 1 Application(s) Topical daily  DULoxetine 20 milliGRAM(s) Oral daily  enoxaparin Injectable 40 milliGRAM(s) SubCutaneous <User Schedule>  ergocalciferol 02388 Unit(s) Oral every week  erythromycin    ethylsuccinate Suspension 40 mG/mL 128 milliGRAM(s) Oral every 12 hours  melatonin 6 milliGRAM(s) Oral at bedtime  metoprolol tartrate 12.5 milliGRAM(s) Oral every 12 hours  midodrine. 10 milliGRAM(s) Oral <User Schedule>  montelukast 10 milliGRAM(s) Oral daily  multivitamin 1 Tablet(s) Oral daily  polyethylene glycol 3350 17 Gram(s) Oral two times a day  pramipexole 3.75 milliGRAM(s) Oral at bedtime  pramipexole 1 milliGRAM(s) Oral <User Schedule>  pregabalin 100 milliGRAM(s) Oral three times a day  senna 2 Tablet(s) Oral at bedtime  tamsulosin 0.4 milliGRAM(s) Oral at bedtime  Vonoprazan (Voquezna) 10mg tablet 1 Tablet(s) 1 Tablet(s) Oral daily    MEDICATIONS  (PRN):  cyclobenzaprine 5 milliGRAM(s) Oral three times a day PRN Muscle Spasm  diphenhydrAMINE 25 milliGRAM(s) Oral every 8 hours PRN Rash and/or Itching  morphine   Solution 5 milliGRAM(s) Oral every 4 hours PRN Severe Pain (7 - 10)  sodium chloride 0.65% Nasal 1 Spray(s) Both Nostrils two times a day PRN Nasal Congestion    ___________________________________________________________________________    PHYSICAL EXAM:    Physical Exam: Gen - NAD, Comfortable  HEENT - NCAT, EOMI, MMM  Pulm - CTAB, No wheeze, No rhonchi, No crackles  Cardiovascular - RRR  Abdomen - Soft, mobile, firm mass in RLQ, mild TTP. +BS throughout all quadrants  Extremities - No C/C/E. No calf tenderness bl.  Neuro-     Cognitive - AAOx3     Communication - Fluent, No dysarthria     Motor -                     LEFT    UE - ShAB 5/5, EF 5/5, EE 5/5, WE 5/5,  5/5                    RIGHT UE - ShAB 5/5, EF 5/5, EE 5/5, WE 5/5,  5/5                    LEFT    LE - HF 5/5, KE 4/5, DF 5/5, PF 5/5                    RIGHT LE - HF 5/5, KE 4/5, DF 5/5, PF 5/5        Sensory - Intact to LT in bl UEs and LEs  Psychiatric - Mood stable, Affect WNL  Skin:  24 cm incision along lower midline of back. Clean, dry, intact. No drainage or skin breakdown. No pressure injuries    ___________________________________________________________________________

## 2024-10-18 NOTE — PROGRESS NOTE ADULT - ASSESSMENT
Mrs. Haley French is a 74-year-old female patient with past medical history of OA, HTN, gastroparesis, peripheral neuropathy, multiple prior thoracolumbar spine surgeries for congenital scoliosis [place] w/ removal of hardware (1991) who presented to [facility] on 9/28/24 with back pain and left hip pain s/p mechanical fall. CT imaging was performed and reported a T12 three column spine fracture-malalignment with fracture extending to the adjacent right 12th posterior rib and left T12-L1 facet joint and acute, displaced fracture of the left eighth lateral rib. Chronic appearing bilateral rib deformities. Surgical management was recommended and she is S/P T9-L3 open fusion with PSO/lag screw partial corpectomy/interbody by Dr. Stroud, Neurosurgeon with complex Plastic Surgery closure by Dr. Elias on 9/29/24. Post op course c/b CSK leak s/p repair, new onset paroxsymal Afib with RVR,  increased pain and drainage from surgical incision, development of pleural effusions, symptomatic orthostatic hypotension, urinary retention, post op anemia, and chest pain with spontaneous resolution. Cardiac work up negative. Patient was evaluated by PM&R and therapy for functional deficits, gait/ADL impairments and acute rehabilitation was recommended. Patient was cleared for discharge to [facility] IRF on 10/16/24.    #Traumatic T11-12 vertebral fracture s/p corpectomy and fusion surgery   - S/P T9-L3 open fusion with PSO/lag screw partial corpectomy/interbody with complex plastic closure ( w/ Dr. Stroud and Dr. Tinajero on 9/29)       * CT Lumbar Spine (9/28/24): T12 three column spine fracture-malalignment with fracture extending to the adjacent right 12th posterior rib and left T12-L1 facet joint. and acute displaced fracture of the left eighth lateral rib.      * CT Lumbar Spine (10/5/24): Status post spinal fusion of T9-L3 with bilateral rods and screws and cylindrical cage within the fractured T12 vertebral body. Resection of the LEFT T12 pedicle and a LEFT hemilaminectomy. Hardware appears intact. No evidence of hardware loosening. Expected postoperative changes.       * Spinal/fall precautions      * TLSO when OOB      * ICE PRN      * Pain management: Tylenol 975mg ATC, Duloxetine 20mg daily, Lyrica 100mg TID , Flexeril 5mg TID PRN  - Activity Limitations: Decreased social, vocational and leisure activities, decreased self care and ADLs, decreased mobility, decreased ability to manage household and finances.   - Comprehensive Multidisciplinary Rehab Program:      * 3 hours a day, 5 days a week.      * PT 2hr/day: Focused on improving strength, endurance, coordination, balance, functional mobility, and transfers      * OT 1hr/day: Focused on improving strength, fine motor skills, coordination, posture and ADLs.      -----------------------------------------------------------------------------------  Concurrent Medical Problems    #Orthostatic hypotension  - Monitor daily  - S/P 1L IVF bolus (10/13) for symptomatic hypotension  - Losartan 100mg daily HOLD  - Chlorthalidone 25mg daily HOLD (may restart if SBP is consistently above 140 per cardio)  - S/p Midodrine     #Seasonal allergies/Nasal congestion  - Singulair 10mg daily   - [place] spray BID PRN    #HLD  - Atorvastatin 40mg HS    #Paroxysmal A-Fib  - TTE 61%  - S/p IV Lopressor x 1   - Metoprolol 25mg BID for rate control  - Monitor, if continue to be in A-fib, will need full dose AC per cardio    #Hyponatremia (resolved)  - Likely 2/2 SIADH in setting of pain and poor solute intake  - Liberalize fluid restriction in setting of soft BP  - Salt tabs 1g TID today  - Monitor Na (10/16 Na 138)    #Post op Anemia.   - S/p PRBCs intraoperatively   - Hb stable with no evidence of active bleed at this time.   - Monitor H/H (10/16 Hgb 9.5)    #SILVANA (obstructive sleep apnea).   · Trialed on RA bedside. when awake and speaking with me, maintains SpO2>92%, however several time she dozed off    during exam during which she would have transient desaturations.  - Nocturnal 2L O2 and PRN during day during naps  - F/u with PCP outpatient as may benefit from sleep study and CPAP.    #Pleural effusion   - 10/10 CT Abd/pelvis- neg for GI bleed  - S/p one dose of Lasix with significant output  - repeat CXR 10/15 with resolution of pleural effusions    #Liver cyst  -10/10 CT Abd/pelvis- Thereare is a large cyst, stable since prior exam. There are small hypodense foci on segment II and segment I too small to characterize, unchanged since prior.  -Outpatient f/u     #Restless Leg Syndrome  -Pramipexole 1mg daily/ 3.75mg HS    #Elevated alkaline phosphatase level (increasing)  -Outpatient f/u with PCP (10/16 Alk Phos 232)    #Mood/Cognition/Depression  Neuropsychology consult PRN  -Duloxetine 20mg daily    #Sleep:   Maintain quiet hours and low stim environment.  Melatonin 6mg HS to maximize participation in therapy during the day.     #GI/Bowel:  Senna QHS, Miralax PRN Daily  GI ppx: Pantoprazole 40mg daily, Voquenza 10mg daily , Eryped 128mg BID     #/Bladder/Urinary retention  - PVR q 8 hours (SC if > 400)  -Flomax 0.4mg HS     #Skin/Pressure Injury:   - Skin assessment on admission: Midline surgical incision c/d/i. No pressure injuries.    #Diet/Supplements  Current Diet: Regular  -Multivitamin daily   -Ergocalciferol 1.25mg weekly    #Precautions / PROPHYLAXIS:  - Falls,  Spinal  - ortho: Weight bearing status: WBAT, TLSO brace OOB   - Lungs: Incentive Spirometer   - DVT ppx: Lovenox 40mg daily   - Pressure injury/Skin:  OOB to Chair, PT/OT      ---------------  Code Status: FULL  Emergency Contact:    Outpatient Follow-up (Specialty/Name of physician):    Cait Watkins  Cardiovascular Disease  [address], [address] [place] [id number]  Phone: [phone]  Fax: [phone]  Follow Up Time: 2 weeks    Eitan Stroud  Neurosurgery  [address] [facility]  [place] [address]  Phone: [phone]  Fax: [phone]  Follow Up Time: 2 weeks    Daubs, Abdoulaye  [facility]  ORTHOSURG [address] [facility]  Scheduled Appointment: 10/22/2024    Angie Koch  [facility]  DERM [address]  Scheduled Appointment: 10/22/2024    Guille Lance  [facility]  ORTHOSURG [address] [facility]  Scheduled Appointment: 11/22/2024    Catalino Cotto  [facility]  WEIGHTMGMT  [facility]  Scheduled Appointment: 12/05/2024    Haydee Bernstein  [facility]  GASTRO [address] [facility] [address]  Scheduled Appointment: 01/06/2025      --------------

## 2024-10-18 NOTE — DIETITIAN INITIAL EVALUATION ADULT - ORAL INTAKE PTA/DIET HISTORY
Patient Does Not Follow Diet @Home But Recently Trying to Decrease "Bread" Consumption  Consumes 2 Meals a Day   Usually Cooks For Self  Doesn't Take Vitamin/Supplements @Home

## 2024-10-18 NOTE — DIETITIAN INITIAL EVALUATION ADULT - NS FNS DIET ORDER
Regular Diet (IDDSI Level 7) w/ Thin Liquids (IDDSI Level 0)   on Ensure Plus High Protein 8oz PO BID (Provides 700kcal & 40grams of Protein)  Declines Nutrition Supplementation - Recommend Discontinue (Per Patient Request)   Education Provided on Proper Nutrition

## 2024-10-18 NOTE — DIETITIAN INITIAL EVALUATION ADULT - ENTER TO (ML/KG)
Pt ambulated to bathroom. Pt ambulated with use of cane to and from bathroom. Pt expressed weakness and dizziness upon walking. Pt needed minimal assistance but was mildly unsteady walking. Urine sample was not obtained- pt dropped the cup and was not able to pick it up again before urinating.   30

## 2024-10-18 NOTE — PROGRESS NOTE ADULT - SUBJECTIVE AND OBJECTIVE BOX
Mrs. Haley French is a 74-year-old female patient with past medical history of OA, HTN, gastroparesis, peripheral neuropathy, multiple prior thoracolumbar spine surgeries for congenital scoliosis [place]/ [place] of hardware (1991) who presented to [facility] on 9/28/24 with back pain and left hip pain s/p mechanical fall. CT imaging was performed and reported a T12 three column spine fracture-malalignment with fracture extending to the adjacent right 12th posterior rib and left T12-L1 facet joint and acute, displaced fracture of the left eighth lateral rib. Chronic appearing bilateral rib deformities. Surgical management was recommended and she is S/P T9-L3 open fusion with PSO/lag screw partial corpectomy/interbody by Dr. Stroud, Neurosurgeon with complex Plastic Surgery closure by Dr. Elias on 9/29/24. Post op course c/b CSK leak s/p repair, new onset paroxsymal Afib with RVR,  increased pain and drainage from surgical incision, development of pleural effusions, symptomatic orthostatic hypotension, urinary retention, post op anemia, and chest pain with spontaneous resolution. Cardiac work up negative. Patient was evaluated by PM&R and therapy for functional deficits, gait/ADL impairments and acute rehabilitation was recommended. Patient was cleared for discharge to [facility] IRF on 10/16/24.    seen at the bedside, c/o back pain, 6/10, improves with meds, no n/v, no headache, dizziness, sob, cp, dysuria.  sitting up in chair        Review of Systems: per hpi     Allergies and Intolerances:        Allergies:  	Dilantin: Drug, Urticaria  	penicillins: Drug Category, Urticaria, angioedema       Intolerances:  	erythromycin: Drug, Stomach Upset, Vomiting, Nausea    Home Medications:   * Patient Currently Takes Medications as of 16-Oct-2024 11:38 documented in Structured Notes  · 	pramipexole 1 mg oral tablet: 1 tab(s) orally once a day at 6 am  · 	Multiple Vitamins oral tablet: 1 tab(s) orally once a day  · 	cyclobenzaprine 5 mg oral tablet: 1 tab(s) orally 3 times a day As needed Muscle Spasm  · 	montelukast 10 mg oral tablet: 1 tab(s) orally once a day  · 	polyethylene glycol 3350 oral powder for reconstitution: 17 gram(s) orally 2 times a day  · 	metoprolol tartrate 25 mg oral tablet: 1 tab(s) orally 2 times a day  · 	atorvastatin 40 mg oral tablet: 1 tab(s) orally once a day (at bedtime)  · 	DULoxetine 20 mg oral delayed release capsule: 1 cap(s) orally once a day  · 	pregabalin 100 mg oral capsule: 1 cap(s) orally 3 times a day  · 	enoxaparin: 40 milligram(s) subcutaneous once a day  · 	HYDROmorphone 1 mg/mL oral liquid: 1 milliliter(s) orally every 4 hours As needed Severe Pain (7 - 10)  · 	acetaminophen 500 mg oral tablet: 1 tab(s) orally every 6 hours as needed for  mild pain  · 	tamsulosin 0.4 mg oral capsule: 1 cap(s) orally once a day (at bedtime)  · 	Voquezna 10 mg oral tablet: 1 tab(s) orally once a day  · 	ergocalciferol 1.25 mg (50,000 intl units) oral capsule: 1 cap(s) orally once a week  · 	pramipexole: 3.75 milligram(s) orally once a day (at bedtime)  · 	erythromycin ethylsuccinate 100 mg/2.5 mL oral liquid: 128 milligram(s) orally every 12 hours    .    Patient History:    Past Medical, Past Surgical, and Family History:  PAST MEDICAL HISTORY:  2019 novel coronavirus disease (COVID-19) Dec 2020- hospitalized for 3 days, did not require home O2, denies residual symptoms    Depression     Essential hypertension     Fungal infection of skin under breast    GERD (gastroesophageal reflux disease)     H/O scoliosis     H/O seasonal allergies     Hiatal hernia     History of chronic pain     History of pulmonary embolism developed after bradford abigail surgery,1984 treated with coumadin 3 months, no issues after    OA (osteoarthritis)     Osteoporosis     Peripheral neuropathy     Restless leg syndrome     Right hip pain.     PAST SURGICAL HISTORY:  H/O arthroscopy of knee June 2021    History of bilateral hip replacements     Removal of pin, plate, abigail, or screw 1991- removal of bradford abigail    S/P cataract surgery     S/P dilatation and curettage 1981    S/P hip replacement left (2008)    S/P hysterectomy 1982    S/P repair of paraesophageal hernia 2001    S/P shoulder surgery right (2012)    S/P spinal fusion L4-L5 1966    S/P spinal surgery x 2 1985, 1986    Scoliosis s/p placement of Bradford abigail x 2 1984.     FAMILY HISTORY:  Mother  Still living? Unknown  Family history of abdominal aortic aneurysm (AAA), Age at diagnosis: Age Unknown.    SOCIAL HISTORY  Smoking - 20 pack years (10 years 2 ppd); quit 40 years ago  EtOH - denies  Drugs - denies    Physical Exam:     Vital Signs Last 24 Hrs  T(C): 36.9 (18 Oct 2024 08:42), Max: 37 (17 Oct 2024 20:06)  T(F): 98.5 (18 Oct 2024 08:42), Max: 98.6 (17 Oct 2024 20:06)  HR: 74 (18 Oct 2024 08:42) (74 - 99)  BP: 93/54 (18 Oct 2024 08:42) (93/54 - 134/71)  BP(mean): --  RR: 16 (18 Oct 2024 08:42) (15 - 16)  SpO2: 94% (18 Oct 2024 08:42) (94% - 97%)    Parameters below as of 18 Oct 2024 08:42  Patient On (Oxygen Delivery Method): room air    GENERAL- NAD  EAR/NOSE/MOUTH/THROAT -  MMM  EYES- TAM, conjunctiva and Sclera clear  NECK- supple  RESPIRATORY-  clear to auscultation bilaterally  CARDIOVASCULAR - SIS2, RRR  GI - soft NT BS present  EXTREMITIES- no pedal edema  NEUROLOGY- no gross focal deficits  PSYCHIATRY- AAO X 3       Labs and Results:        Basic Metabolic Panel in AM (10.16.24 @ 06:54)    Sodium: 138 mmol/L   Potassium: 4.4 mmol/L   Chloride: 99 mmol/L   Carbon Dioxide: 26 mmol/L   Anion Gap: 13 mmol/L   Blood Urea Nitrogen: 25 mg/dL   Creatinine: 0.80 mg/dL   Glucose: 109 mg/dL   Calcium: 9.5 mg/dL   eGFR: 76: The estimated glomerular filtration rate (eGFR) calculation is based on  the 2021 CKD-EPI creatinine equation, which is validated in male and  female population 18 years of age and older (N Engl J Med 2021;  [id number]). mL/min/1.73m2      Complete Blood Count in AM (10.16.24 @ 06:53)    Nucleated RBC: 0 /100 WBCs   WBC Count: 6.11 K/uL   RBC Count: 3.41 M/uL   Hemoglobin: 9.5 g/dL   Hematocrit: 30.7 %   Mean Cell Volume: 90.0 fl   Mean Cell Hemoglobin: 27.9 pg   Mean Cell Hemoglobin Conc: 30.9 gm/dL   Red Cell Distrib Width: 15.5 %   Platelet Count - Automated: 547 K/uL      Labs reviewed:     CXR personally reviewed: < from: Xray Chest 1 View- PORTABLE-Routine (Xray Chest 1 View- PORTABLE-Routine in AM.) (10.15.24 @ 09:14) >    Frontal expiratory view of the chest shows the heart to be similar in   size. Spinal hardware and right shoulder hardware are again noted.    The lungs show partial clearing of the left base and there is no evidence   of pneumothorax nor right pleural effusion.    Chest one view 10/15/2024 8:27 AM  Compared to the prior study, there is further clearing of the left base.    IMPRESSION:  Left base clearing.    < end of copied text >      ECG reviewed and interpreted: < from: 12 Lead ECG (10.09.24 @ 11:34) >    Ventricular Rate 72 BPM    Atrial Rate 72 BPM    P-R Interval 158 ms    QRS Duration 82 ms    Q-T Interval 406 ms    QTC Calculation(Bazett) 444 ms    P Axis 41 degrees    R Axis -5 degrees    T Axis 18 degrees    Diagnosis Line NORMAL SINUS RHYTHM    < end of copied text >    < from: TTE W or WO Ultrasound Enhancing Agent (10.01.24 @ 10:43) >      1. Left ventricular cavity is normal in size. Left ventricular wall thickness is normal. Left ventricular systolic function is normal with an ejection fraction of 61 % by 3D. There are no regional wall motion abnormalities seen.    < end of copied text >      < from: CT Chest w/ IV Cont (10.10.24 @ 04:33) >  IMPRESSION:  Unchanged postoperative appearance of the spine with T12 fracture again   noted.    < end of copied text >    MEDICATIONS  (STANDING):  acetaminophen     Tablet .. 975 milliGRAM(s) Oral every 8 hours  atorvastatin 40 milliGRAM(s) Oral at bedtime  calamine/zinc oxide Lotion 1 Application(s) Topical daily  DULoxetine 20 milliGRAM(s) Oral daily  enoxaparin Injectable 40 milliGRAM(s) SubCutaneous <User Schedule>  ergocalciferol 56921 Unit(s) Oral every week  erythromycin    ethylsuccinate Suspension 40 mG/mL 128 milliGRAM(s) Oral every 12 hours  melatonin 6 milliGRAM(s) Oral at bedtime  metoprolol tartrate 25 milliGRAM(s) Oral two times a day  montelukast 10 milliGRAM(s) Oral daily  multivitamin 1 Tablet(s) Oral daily  polyethylene glycol 3350 17 Gram(s) Oral two times a day  pramipexole 3.75 milliGRAM(s) Oral at bedtime  pramipexole 1 milliGRAM(s) Oral <User Schedule>  pregabalin 100 milliGRAM(s) Oral three times a day  senna 2 Tablet(s) Oral at bedtime  tamsulosin 0.4 milliGRAM(s) Oral at bedtime  Vonoprazan (Voquezna) 10mg tablet 1 Tablet(s) 1 Tablet(s) Oral daily    MEDICATIONS  (PRN):  cyclobenzaprine 5 milliGRAM(s) Oral three times a day PRN Muscle Spasm  diphenhydrAMINE 25 milliGRAM(s) Oral every 8 hours PRN Rash and/or Itching  morphine   Solution 5 milliGRAM(s) Oral every 4 hours PRN Severe Pain (7 - 10)  sodium chloride 0.65% Nasal 1 Spray(s) Both Nostrils two times a day PRN Nasal Congestion

## 2024-10-18 NOTE — DIETITIAN INITIAL EVALUATION ADULT - ADD RECOMMEND
1) Monitor Weights, Intake, Tolerance, Skin & Labwork  2) Education Provided on Proper Nutrition  3) Recommend Discontinue Ensure Plus High Protein 8oz PO BID  4) Continue Nutrition Plan of Care

## 2024-10-18 NOTE — DIETITIAN INITIAL EVALUATION ADULT - ORAL NUTRITION SUPPLEMENTS
on Ensure Plus High Protein 8oz PO BID (Provides 700kcal & 40grams of Protein)  Declines Nutrition Supplementation - Recommend Discontinue (Per Patient Request)

## 2024-10-19 PROCEDURE — 99232 SBSQ HOSP IP/OBS MODERATE 35: CPT

## 2024-10-19 RX ORDER — MIDODRINE HYDROCHLORIDE 2.5 MG/1
7.5 TABLET ORAL
Refills: 0 | Status: DISCONTINUED | OUTPATIENT
Start: 2024-10-19 | End: 2024-10-23

## 2024-10-19 RX ADMIN — Medication 975 MILLIGRAM(S): at 13:19

## 2024-10-19 RX ADMIN — CYCLOBENZAPRINE HYDROCHLORIDE 5 MILLIGRAM(S): 30 CAPSULE, EXTENDED RELEASE ORAL at 06:54

## 2024-10-19 RX ADMIN — MORPHINE SULFATE 5 MILLIGRAM(S): 30 TABLET, EXTENDED RELEASE ORAL at 23:17

## 2024-10-19 RX ADMIN — MORPHINE SULFATE 5 MILLIGRAM(S): 30 TABLET, EXTENDED RELEASE ORAL at 21:33

## 2024-10-19 RX ADMIN — MORPHINE SULFATE 5 MILLIGRAM(S): 30 TABLET, EXTENDED RELEASE ORAL at 16:51

## 2024-10-19 RX ADMIN — Medication 40 MILLIGRAM(S): at 17:35

## 2024-10-19 RX ADMIN — DULOXETINE HYDROCHLORIDE 20 MILLIGRAM(S): 30 CAPSULE, DELAYED RELEASE ORAL at 11:20

## 2024-10-19 RX ADMIN — POLYETHYLENE GLYCOL 3350 17 GRAM(S): 17 POWDER, FOR SOLUTION ORAL at 17:36

## 2024-10-19 RX ADMIN — PRAMIPEXOLE DIHYDROCHLORIDE 1 MILLIGRAM(S): 0.12 TABLET ORAL at 05:24

## 2024-10-19 RX ADMIN — Medication 975 MILLIGRAM(S): at 21:34

## 2024-10-19 RX ADMIN — Medication 128 MILLIGRAM(S): at 05:24

## 2024-10-19 RX ADMIN — Medication 975 MILLIGRAM(S): at 05:24

## 2024-10-19 RX ADMIN — Medication 12.5 MILLIGRAM(S): at 05:24

## 2024-10-19 RX ADMIN — MONTELUKAST SODIUM 10 MILLIGRAM(S): 10 TABLET, FILM COATED ORAL at 11:19

## 2024-10-19 RX ADMIN — Medication 1 APPLICATION(S): at 11:14

## 2024-10-19 RX ADMIN — Medication 975 MILLIGRAM(S): at 14:23

## 2024-10-19 RX ADMIN — Medication 975 MILLIGRAM(S): at 05:54

## 2024-10-19 RX ADMIN — MORPHINE SULFATE 5 MILLIGRAM(S): 30 TABLET, EXTENDED RELEASE ORAL at 17:35

## 2024-10-19 RX ADMIN — PREGABALIN 100 MILLIGRAM(S): 150 CAPSULE ORAL at 05:23

## 2024-10-19 RX ADMIN — MORPHINE SULFATE 5 MILLIGRAM(S): 30 TABLET, EXTENDED RELEASE ORAL at 11:20

## 2024-10-19 RX ADMIN — Medication 1 TABLET(S): at 11:19

## 2024-10-19 RX ADMIN — POLYETHYLENE GLYCOL 3350 17 GRAM(S): 17 POWDER, FOR SOLUTION ORAL at 08:08

## 2024-10-19 RX ADMIN — Medication 128 MILLIGRAM(S): at 17:59

## 2024-10-19 RX ADMIN — PREGABALIN 100 MILLIGRAM(S): 150 CAPSULE ORAL at 21:33

## 2024-10-19 RX ADMIN — Medication 6 MILLIGRAM(S): at 21:33

## 2024-10-19 RX ADMIN — MORPHINE SULFATE 5 MILLIGRAM(S): 30 TABLET, EXTENDED RELEASE ORAL at 18:04

## 2024-10-19 RX ADMIN — Medication 0.4 MILLIGRAM(S): at 21:42

## 2024-10-19 RX ADMIN — Medication 975 MILLIGRAM(S): at 21:51

## 2024-10-19 RX ADMIN — Medication 12.5 MILLIGRAM(S): at 17:36

## 2024-10-19 RX ADMIN — Medication 40 MILLIGRAM(S): at 21:42

## 2024-10-19 RX ADMIN — Medication 25 MILLIGRAM(S): at 21:32

## 2024-10-19 RX ADMIN — PRAMIPEXOLE DIHYDROCHLORIDE 3.75 MILLIGRAM(S): 0.12 TABLET ORAL at 21:42

## 2024-10-19 RX ADMIN — Medication 2 TABLET(S): at 21:42

## 2024-10-19 NOTE — PROGRESS NOTE ADULT - SUBJECTIVE AND OBJECTIVE BOX
Medicine Progress Note    Patient is a 74y old  Female who presents with a chief complaint of Traumatic T11-12 vertebral fracture s/p corpectomy and fusion surgery (19 Oct 2024 11:22)      SUBJECTIVE / OVERNIGHT EVENTS:  seen and examined  Chart reviewed  No overnight events  Limb weakness improving with therapy  BM+  No pain  No complaints      ROS:  denied fever/chills/CP/SOB/cough/palpitation/dizziness/abdominal pian/nausea/vomiting/diarrhoea/constipation/dysuria/leg or calf pain/headaches.all other ROS neg    MEDICATIONS  (STANDING):  acetaminophen     Tablet .. 975 milliGRAM(s) Oral every 8 hours  atorvastatin 40 milliGRAM(s) Oral at bedtime  calamine/zinc oxide Lotion 1 Application(s) Topical daily  DULoxetine 20 milliGRAM(s) Oral daily  enoxaparin Injectable 40 milliGRAM(s) SubCutaneous <User Schedule>  ergocalciferol 30653 Unit(s) Oral every week  erythromycin    ethylsuccinate Suspension 40 mG/mL 128 milliGRAM(s) Oral every 12 hours  melatonin 6 milliGRAM(s) Oral at bedtime  metoprolol tartrate 12.5 milliGRAM(s) Oral every 12 hours  midodrine. 10 milliGRAM(s) Oral <User Schedule>  montelukast 10 milliGRAM(s) Oral daily  multivitamin 1 Tablet(s) Oral daily  polyethylene glycol 3350 17 Gram(s) Oral two times a day  pramipexole 3.75 milliGRAM(s) Oral at bedtime  pramipexole 1 milliGRAM(s) Oral <User Schedule>  pregabalin 100 milliGRAM(s) Oral three times a day  senna 2 Tablet(s) Oral at bedtime  tamsulosin 0.4 milliGRAM(s) Oral at bedtime  Vonoprazan (Voquezna) 10mg tablet 1 Tablet(s) 1 Tablet(s) Oral daily    MEDICATIONS  (PRN):  cyclobenzaprine 5 milliGRAM(s) Oral three times a day PRN Muscle Spasm  diphenhydrAMINE 25 milliGRAM(s) Oral every 8 hours PRN Rash and/or Itching  morphine   Solution 5 milliGRAM(s) Oral every 4 hours PRN Severe Pain (7 - 10)  sodium chloride 0.65% Nasal 1 Spray(s) Both Nostrils two times a day PRN Nasal Congestion    CAPILLARY BLOOD GLUCOSE        I&O's Summary      PHYSICAL EXAM:  Vital Signs Last 24 Hrs  T(C): 36.6 (19 Oct 2024 07:44), Max: 36.9 (18 Oct 2024 19:49)  T(F): 97.8 (19 Oct 2024 07:44), Max: 98.4 (18 Oct 2024 19:49)  HR: 83 (19 Oct 2024 07:44) (81 - 97)  BP: 149/84 (19 Oct 2024 07:44) (117/64 - 150/74)  BP(mean): --  RR: 15 (19 Oct 2024 07:44) (15 - 16)  SpO2: 98% (19 Oct 2024 07:44) (94% - 98%)    Parameters below as of 19 Oct 2024 07:44  Patient On (Oxygen Delivery Method): room air    GENERAL: Not in distress. Alert    HEENT: clear conjuctiva, MMM. no pallor or icterus  CARDIOVASCULAR: irregular S1, S2. No murmur/rubs/gallop  LUNGS: BLAE+, no rales, no wheezing, no rhonchi.    ABDOMEN: ND. Soft,  mass in RLQ, mild TTP, no guarding / rebound / rigidity. BS normoactive  BACK: No spine tenderness.  EXTREMITIES: no edema. no leg or calf TP.  SKIN: warm and dry, midline incision healing well  PSYCHIATRIC: Calm.  No agitation.  CNS: AAO. moves limbs, follows commands.      LABS:                        9.7    5.87  )-----------( 489      ( 18 Oct 2024 09:12 )             30.7     10-18    137  |  98  |  29[H]  ----------------------------<  127[H]  3.3[L]   |  30  |  0.69    Ca    8.5      18 Oct 2024 09:12    TPro  6.6  /  Alb  3.0[L]  /  TBili  0.4  /  DBili  x   /  AST  35  /  ALT  60[H]  /  AlkPhos  247[H]  10-18          Urinalysis Basic - ( 18 Oct 2024 09:12 )    Color: x / Appearance: x / SG: x / pH: x  Gluc: 127 mg/dL / Ketone: x  / Bili: x / Urobili: x   Blood: x / Protein: x / Nitrite: x   Leuk Esterase: x / RBC: x / WBC x   Sq Epi: x / Non Sq Epi: x / Bacteria: x        COVID-19 PCR: NotDetec (17 Oct 2024 16:34)      RADIOLOGY & ADDITIONAL TESTS:  Imaging from Last 24 Hours:    Electrocardiogram/QTc Interval:    COORDINATION OF CARE:  Care Discussed with Consultants/Other Providers:

## 2024-10-19 NOTE — PROGRESS NOTE ADULT - SUBJECTIVE AND OBJECTIVE BOX
Cc: Impaired mobility     HPI: Patient resting comfortably, denies any new complaints.   Has been tolerating rehabilitation program.    acetaminophen     Tablet .. 975 milliGRAM(s) Oral every 8 hours  atorvastatin 40 milliGRAM(s) Oral at bedtime  calamine/zinc oxide Lotion 1 Application(s) Topical daily  cyclobenzaprine 5 milliGRAM(s) Oral three times a day PRN  diphenhydrAMINE 25 milliGRAM(s) Oral every 8 hours PRN  DULoxetine 20 milliGRAM(s) Oral daily  enoxaparin Injectable 40 milliGRAM(s) SubCutaneous <User Schedule>  ergocalciferol 22209 Unit(s) Oral every week  erythromycin    ethylsuccinate Suspension 40 mG/mL 128 milliGRAM(s) Oral every 12 hours  melatonin 6 milliGRAM(s) Oral at bedtime  metoprolol tartrate 12.5 milliGRAM(s) Oral every 12 hours  midodrine. 10 milliGRAM(s) Oral <User Schedule>  montelukast 10 milliGRAM(s) Oral daily  morphine   Solution 5 milliGRAM(s) Oral every 4 hours PRN  multivitamin 1 Tablet(s) Oral daily  polyethylene glycol 3350 17 Gram(s) Oral two times a day  pramipexole 3.75 milliGRAM(s) Oral at bedtime  pramipexole 1 milliGRAM(s) Oral <User Schedule>  pregabalin 100 milliGRAM(s) Oral three times a day  senna 2 Tablet(s) Oral at bedtime  sodium chloride 0.65% Nasal 1 Spray(s) Both Nostrils two times a day PRN  tamsulosin 0.4 milliGRAM(s) Oral at bedtime  Vonoprazan (Voquezna) 10mg tablet 1 Tablet(s) 1 Tablet(s) Oral daily      T(C): 36.6 (10-19-24 @ 07:44), Max: 36.9 (10-18-24 @ 19:49)  HR: 83 (10-19-24 @ 07:44) (81 - 97)  BP: 149/84 (10-19-24 @ 07:44) (117/64 - 150/74)  RR: 15 (10-19-24 @ 07:44) (15 - 16)  SpO2: 98% (10-19-24 @ 07:44) (94% - 98%)    In NAD  HEENT- EOMI  Heart- No cyanosis  Lungs- No respiratory distress  Abd- NT  Ext- No calf pain  Neuro- Exam unchanged                Imp: Patient with diagnosis of Traumatic T11-12 vertebral fracture s/p corpectomy and fusion surgery S/P T9-L3 open fusion with PSO/lag screw partial corpectomy/interbody with complex plastic closure admitted for comprehensive acute rehabilitation.    Plan:  -Impaired mobility - Continue PT/OT, TLSO  - DVT prophylaxis - Lovenox  - Skin- Turn q2h, check skin daily  - Continue current medications; patient medically stable.   -Active issues-   -Neuropathic pain -  pregabalin   -HLD -  atorvastatin  -Appreciate internal medicine consultation  -Continue current rehabilitation program.

## 2024-10-19 NOTE — PROGRESS NOTE ADULT - ASSESSMENT
Mrs. Haley French is a 74-year-old female patient with past medical history of OA, HTN, gastroparesis, peripheral neuropathy, multiple prior thoracolumbar spine surgeries for congenital scoliosis [place]/ [place]1991) who presented to [facility] on 9/28/24 with back pain and left hip pain s/p mechanical fall. CT imaging was performed and reported a T12 three column spine fracture-malalignment with fracture extending to the adjacent right 12th posterior rib and left T12-L1 facet joint and acute, displaced fracture of the left eighth lateral rib. Chronic appearing bilateral rib deformities. Surgical management was recommended and she is S/P T9-L3 open fusion with PSO/lag screw partial corpectomy/interbody by Dr. Stroud, Neurosurgeon with complex Plastic Surgery closure by Dr. Elias on 9/29/24. Post op course c/b CSK leak s/p repair, new onset paroxsymal Afib with RVR,  increased pain and drainage from surgical incision, development of pleural effusions, symptomatic orthostatic hypotension, urinary retention, post op anemia, and chest pain with spontaneous resolution. Cardiac work up negative. Patient was evaluated by PM&R and therapy for functional deficits, gait/ADL impairments and acute rehabilitation was recommended. Patient was cleared for discharge to [facility] IRF on 10/16/24.    #Traumatic T11-12 vertebral fracture s/p corpectomy and fusion surgery   - S/P T9-L3 open fusion with PSO/lag screw partial corpectomy/interbody with complex plastic closure ( w/ Dr. Stroud and Dr. Tinajero on 9/29)   - CT Lumbar Spine (9/28/24): T12 three column spine fracture-malalignment with fracture extending to the adjacent right 12th posterior rib and left T12-L1 facet joint. and acute displaced fracture of the left eighth lateral rib.  - CT Lumbar Spine (10/5/24): Status post spinal fusion of T9-L3 with bilateral rods and screws and cylindrical cage within the fractured T12 vertebral body. Resection of the LEFT T12 pedicle and a LEFT hemilaminectomy.      Hardware appears intact. No evidence of hardware loosening. Expected postoperative changes.   - TLSO when OOB  - Pain management: Tylenol 975mg ATC, Duloxetine 20mg daily, Lyrica 100mg TID , Flexeril 5mg TID PRN  - pt/ot/dvt ppx/ pain meds    # PAF  #HTN  #Orthostatic hypotension  - TTE 61%  - S/P 1L IVF bolus (10/13) for symptomatic hypotension  - Losartan 100mg daily HOLD  - Chlorthalidone 25mg daily HOLD (may restart if SBP is consistently above 140 per cardio)  - decrease metoprolol to 12.5 BID  - on Midodrine 10 mg at 7 am. reduced to 7/5 mg at 7 am/ wean off as able  - encouraged PO  - Monitor, if continue to be in A-fib, will need full dose AC per cardio    #Seasonal allergies/Nasal congestion  - Singulair     #HLD  - Atorvastatin     #Hyponatremia (resolved)  - Likely 2/2 SIADH in setting of pain and poor solute intake  - Liberalize fluid restriction in setting of soft BP  - Salt tabs 1g TID dced  - Monitor Na     #Post op Anemia.   - S/p PRBCs intraoperatively   - Hb stable with no evidence of active bleed at this time.   - Monitors    #SILVANA (obstructive sleep apnea).   - transient desaturations on RA    - c/w Nocturnal 2L O2 and PRN during day during naps, monitor sats   - F/u with PCP outpatient as may benefit from sleep study and CPAP.    #Pleural effusion   - 10/10 CT Abd/pelvis- neg for GI bleed  - S/p one dose of Lasix with significant output  - repeat CXR 10/15 with resolution of pleural effusions    #Liver cyst  -10/10 CT Abd/pelvis- There is a large cyst, stable since prior exam. There are small hypodense foci on segment II and segment I too small to characterize, unchanged since prior.  -Outpatient f/u     #Restless Leg Syndrome  -Pramipexole     #Elevated alkaline phosphatase level   - monitor     #Depression  -Duloxetine     # Gastritis-  Pantoprazole  Voquenza  , Eryped     #Urinary retention  - PVR q 8 hours (SC if > 400)  -Flomax     #DVT ppx: Lovenox     will follow    d/w rehab team at [facility]

## 2024-10-20 LAB
ALBUMIN SERPL ELPH-MCNC: 3.3 G/DL — SIGNIFICANT CHANGE UP (ref 3.3–5)
ALP SERPL-CCNC: 228 U/L — HIGH (ref 40–120)
ALT FLD-CCNC: 43 U/L — SIGNIFICANT CHANGE UP (ref 10–45)
ANION GAP SERPL CALC-SCNC: 12 MMOL/L — SIGNIFICANT CHANGE UP (ref 5–17)
APPEARANCE UR: ABNORMAL
APTT BLD: 29.2 SEC — SIGNIFICANT CHANGE UP (ref 24.5–35.6)
AST SERPL-CCNC: 24 U/L — SIGNIFICANT CHANGE UP (ref 10–40)
BASOPHILS # BLD AUTO: 0.05 K/UL — SIGNIFICANT CHANGE UP (ref 0–0.2)
BASOPHILS NFR BLD AUTO: 0.5 % — SIGNIFICANT CHANGE UP (ref 0–2)
BILIRUB SERPL-MCNC: 0.6 MG/DL — SIGNIFICANT CHANGE UP (ref 0.2–1.2)
BILIRUB UR-MCNC: NEGATIVE — SIGNIFICANT CHANGE UP
BUN SERPL-MCNC: 18 MG/DL — SIGNIFICANT CHANGE UP (ref 7–23)
CALCIUM SERPL-MCNC: 9 MG/DL — SIGNIFICANT CHANGE UP (ref 8.4–10.5)
CHLORIDE SERPL-SCNC: 100 MMOL/L — SIGNIFICANT CHANGE UP (ref 96–108)
CO2 SERPL-SCNC: 27 MMOL/L — SIGNIFICANT CHANGE UP (ref 22–31)
COLOR SPEC: YELLOW — SIGNIFICANT CHANGE UP
CREAT SERPL-MCNC: 0.97 MG/DL — SIGNIFICANT CHANGE UP (ref 0.5–1.3)
DIFF PNL FLD: ABNORMAL
EGFR: 60 ML/MIN/1.73M2 — SIGNIFICANT CHANGE UP
EOSINOPHIL # BLD AUTO: 0.06 K/UL — SIGNIFICANT CHANGE UP (ref 0–0.5)
EOSINOPHIL NFR BLD AUTO: 0.6 % — SIGNIFICANT CHANGE UP (ref 0–6)
GLUCOSE BLDC GLUCOMTR-MCNC: 167 MG/DL — HIGH (ref 70–99)
GLUCOSE SERPL-MCNC: 127 MG/DL — HIGH (ref 70–99)
GLUCOSE UR QL: NEGATIVE MG/DL — SIGNIFICANT CHANGE UP
HCT VFR BLD CALC: 31.8 % — LOW (ref 34.5–45)
HGB BLD-MCNC: 10 G/DL — LOW (ref 11.5–15.5)
IMM GRANULOCYTES NFR BLD AUTO: 0.5 % — SIGNIFICANT CHANGE UP (ref 0–0.9)
INR BLD: 1.02 RATIO — SIGNIFICANT CHANGE UP (ref 0.85–1.16)
KETONES UR-MCNC: NEGATIVE MG/DL — SIGNIFICANT CHANGE UP
LACTATE SERPL-SCNC: 1.7 MMOL/L — SIGNIFICANT CHANGE UP (ref 0.7–2)
LACTATE SERPL-SCNC: 2.6 MMOL/L — HIGH (ref 0.7–2)
LACTATE SERPL-SCNC: 2.9 MMOL/L — HIGH (ref 0.7–2)
LEUKOCYTE ESTERASE UR-ACNC: ABNORMAL
LYMPHOCYTES # BLD AUTO: 0.67 K/UL — LOW (ref 1–3.3)
LYMPHOCYTES # BLD AUTO: 6.3 % — LOW (ref 13–44)
MCHC RBC-ENTMCNC: 28.2 PG — SIGNIFICANT CHANGE UP (ref 27–34)
MCHC RBC-ENTMCNC: 31.4 GM/DL — LOW (ref 32–36)
MCV RBC AUTO: 89.8 FL — SIGNIFICANT CHANGE UP (ref 80–100)
MONOCYTES # BLD AUTO: 0.58 K/UL — SIGNIFICANT CHANGE UP (ref 0–0.9)
MONOCYTES NFR BLD AUTO: 5.5 % — SIGNIFICANT CHANGE UP (ref 2–14)
NEUTROPHILS # BLD AUTO: 9.17 K/UL — HIGH (ref 1.8–7.4)
NEUTROPHILS NFR BLD AUTO: 86.6 % — HIGH (ref 43–77)
NITRITE UR-MCNC: POSITIVE
NRBC # BLD: 0 /100 WBCS — SIGNIFICANT CHANGE UP (ref 0–0)
PH UR: 6 — SIGNIFICANT CHANGE UP (ref 5–8)
PLATELET # BLD AUTO: 462 K/UL — HIGH (ref 150–400)
POTASSIUM SERPL-MCNC: 4 MMOL/L — SIGNIFICANT CHANGE UP (ref 3.5–5.3)
POTASSIUM SERPL-SCNC: 4 MMOL/L — SIGNIFICANT CHANGE UP (ref 3.5–5.3)
PROT SERPL-MCNC: 7 G/DL — SIGNIFICANT CHANGE UP (ref 6–8.3)
PROT UR-MCNC: SIGNIFICANT CHANGE UP MG/DL
PROTHROM AB SERPL-ACNC: 12 SEC — SIGNIFICANT CHANGE UP (ref 9.9–13.4)
RBC # BLD: 3.54 M/UL — LOW (ref 3.8–5.2)
RBC # FLD: 15.9 % — HIGH (ref 10.3–14.5)
SODIUM SERPL-SCNC: 139 MMOL/L — SIGNIFICANT CHANGE UP (ref 135–145)
SP GR SPEC: 1.02 — SIGNIFICANT CHANGE UP (ref 1–1.03)
UROBILINOGEN FLD QL: 1 MG/DL — SIGNIFICANT CHANGE UP (ref 0.2–1)
WBC # BLD: 10.58 K/UL — HIGH (ref 3.8–10.5)
WBC # FLD AUTO: 10.58 K/UL — HIGH (ref 3.8–10.5)

## 2024-10-20 PROCEDURE — 93010 ELECTROCARDIOGRAM REPORT: CPT

## 2024-10-20 PROCEDURE — 99232 SBSQ HOSP IP/OBS MODERATE 35: CPT

## 2024-10-20 PROCEDURE — 70450 CT HEAD/BRAIN W/O DYE: CPT | Mod: 26

## 2024-10-20 PROCEDURE — 71045 X-RAY EXAM CHEST 1 VIEW: CPT | Mod: 26

## 2024-10-20 PROCEDURE — 93970 EXTREMITY STUDY: CPT | Mod: 26

## 2024-10-20 RX ORDER — AZTREONAM 75 MG/ML
1000 KIT INHALATION EVERY 8 HOURS
Refills: 0 | Status: DISCONTINUED | OUTPATIENT
Start: 2024-10-20 | End: 2024-10-22

## 2024-10-20 RX ORDER — SODIUM CHLORIDE 9 MG/ML
1000 INJECTION, SOLUTION INTRAMUSCULAR; INTRAVENOUS; SUBCUTANEOUS
Refills: 0 | Status: DISCONTINUED | OUTPATIENT
Start: 2024-10-20 | End: 2024-10-23

## 2024-10-20 RX ORDER — VANCOMYCIN HYDROCHLORIDE 50 MG/ML
1250 KIT ORAL EVERY 12 HOURS
Refills: 0 | Status: DISCONTINUED | OUTPATIENT
Start: 2024-10-20 | End: 2024-10-22

## 2024-10-20 RX ORDER — VANCOMYCIN HYDROCHLORIDE 50 MG/ML
1250 KIT ORAL ONCE
Refills: 0 | Status: COMPLETED | OUTPATIENT
Start: 2024-10-20 | End: 2024-10-20

## 2024-10-20 RX ORDER — ACETAMINOPHEN 500 MG
975 TABLET ORAL EVERY 8 HOURS
Refills: 0 | Status: DISCONTINUED | OUTPATIENT
Start: 2024-10-20 | End: 2024-10-21

## 2024-10-20 RX ORDER — VANCOMYCIN HYDROCHLORIDE 50 MG/ML
1330 KIT ORAL EVERY 12 HOURS
Refills: 0 | Status: DISCONTINUED | OUTPATIENT
Start: 2024-10-20 | End: 2024-10-20

## 2024-10-20 RX ORDER — SODIUM CHLORIDE 9 MG/ML
1000 INJECTION, SOLUTION INTRAMUSCULAR; INTRAVENOUS; SUBCUTANEOUS ONCE
Refills: 0 | Status: COMPLETED | OUTPATIENT
Start: 2024-10-20 | End: 2024-10-20

## 2024-10-20 RX ORDER — AZTREONAM 75 MG/ML
1000 KIT INHALATION ONCE
Refills: 0 | Status: COMPLETED | OUTPATIENT
Start: 2024-10-20 | End: 2024-10-20

## 2024-10-20 RX ORDER — SODIUM CHLORIDE 9 MG/ML
500 INJECTION, SOLUTION INTRAMUSCULAR; INTRAVENOUS; SUBCUTANEOUS ONCE
Refills: 0 | Status: COMPLETED | OUTPATIENT
Start: 2024-10-20 | End: 2024-10-20

## 2024-10-20 RX ORDER — SODIUM CHLORIDE 9 MG/ML
250 INJECTION, SOLUTION INTRAMUSCULAR; INTRAVENOUS; SUBCUTANEOUS ONCE
Refills: 0 | Status: COMPLETED | OUTPATIENT
Start: 2024-10-20 | End: 2024-10-20

## 2024-10-20 RX ORDER — ACETAMINOPHEN 500 MG
1000 TABLET ORAL ONCE
Refills: 0 | Status: DISCONTINUED | OUTPATIENT
Start: 2024-10-21 | End: 2024-10-21

## 2024-10-20 RX ORDER — ACETAMINOPHEN 500 MG
1000 TABLET ORAL ONCE
Refills: 0 | Status: COMPLETED | OUTPATIENT
Start: 2024-10-20 | End: 2024-10-20

## 2024-10-20 RX ORDER — SODIUM CHLORIDE 9 MG/ML
1000 INJECTION, SOLUTION INTRAMUSCULAR; INTRAVENOUS; SUBCUTANEOUS
Refills: 0 | Status: DISCONTINUED | OUTPATIENT
Start: 2024-10-20 | End: 2024-10-20

## 2024-10-20 RX ORDER — SODIUM CHLORIDE 9 MG/ML
3000 INJECTION, SOLUTION INTRAMUSCULAR; INTRAVENOUS; SUBCUTANEOUS
Refills: 0 | Status: DISCONTINUED | OUTPATIENT
Start: 2024-10-20 | End: 2024-10-20

## 2024-10-20 RX ORDER — AZTREONAM 75 MG/ML
KIT INHALATION
Refills: 0 | Status: DISCONTINUED | OUTPATIENT
Start: 2024-10-20 | End: 2024-10-22

## 2024-10-20 RX ADMIN — Medication 128 MILLIGRAM(S): at 06:32

## 2024-10-20 RX ADMIN — Medication 12.5 MILLIGRAM(S): at 06:30

## 2024-10-20 RX ADMIN — MORPHINE SULFATE 5 MILLIGRAM(S): 30 TABLET, EXTENDED RELEASE ORAL at 06:29

## 2024-10-20 RX ADMIN — SODIUM CHLORIDE 500 MILLILITER(S): 9 INJECTION, SOLUTION INTRAMUSCULAR; INTRAVENOUS; SUBCUTANEOUS at 18:29

## 2024-10-20 RX ADMIN — AZTREONAM 50 MILLIGRAM(S): KIT at 14:47

## 2024-10-20 RX ADMIN — Medication 975 MILLIGRAM(S): at 06:31

## 2024-10-20 RX ADMIN — MIDODRINE HYDROCHLORIDE 7.5 MILLIGRAM(S): 2.5 TABLET ORAL at 06:30

## 2024-10-20 RX ADMIN — Medication 0.4 MILLIGRAM(S): at 22:24

## 2024-10-20 RX ADMIN — PRAMIPEXOLE DIHYDROCHLORIDE 3.75 MILLIGRAM(S): 0.12 TABLET ORAL at 22:24

## 2024-10-20 RX ADMIN — MORPHINE SULFATE 5 MILLIGRAM(S): 30 TABLET, EXTENDED RELEASE ORAL at 23:55

## 2024-10-20 RX ADMIN — MORPHINE SULFATE 5 MILLIGRAM(S): 30 TABLET, EXTENDED RELEASE ORAL at 17:46

## 2024-10-20 RX ADMIN — Medication 12.5 MILLIGRAM(S): at 18:23

## 2024-10-20 RX ADMIN — Medication 400 MILLIGRAM(S): at 18:53

## 2024-10-20 RX ADMIN — PRAMIPEXOLE DIHYDROCHLORIDE 1 MILLIGRAM(S): 0.12 TABLET ORAL at 06:32

## 2024-10-20 RX ADMIN — VANCOMYCIN HYDROCHLORIDE 166.67 MILLIGRAM(S): KIT at 15:49

## 2024-10-20 RX ADMIN — Medication 40 MILLIGRAM(S): at 17:49

## 2024-10-20 RX ADMIN — SODIUM CHLORIDE 125 MILLILITER(S): 9 INJECTION, SOLUTION INTRAMUSCULAR; INTRAVENOUS; SUBCUTANEOUS at 13:13

## 2024-10-20 RX ADMIN — Medication 2 TABLET(S): at 22:24

## 2024-10-20 RX ADMIN — Medication 975 MILLIGRAM(S): at 06:50

## 2024-10-20 RX ADMIN — Medication 6 MILLIGRAM(S): at 23:55

## 2024-10-20 RX ADMIN — Medication 1000 MILLIGRAM(S): at 19:00

## 2024-10-20 RX ADMIN — CYCLOBENZAPRINE HYDROCHLORIDE 5 MILLIGRAM(S): 30 CAPSULE, EXTENDED RELEASE ORAL at 06:32

## 2024-10-20 RX ADMIN — Medication 1000 MILLIGRAM(S): at 14:19

## 2024-10-20 RX ADMIN — DULOXETINE HYDROCHLORIDE 20 MILLIGRAM(S): 30 CAPSULE, DELAYED RELEASE ORAL at 11:29

## 2024-10-20 RX ADMIN — Medication 1 TABLET(S): at 11:30

## 2024-10-20 RX ADMIN — Medication 400 MILLIGRAM(S): at 13:51

## 2024-10-20 RX ADMIN — SODIUM CHLORIDE 125 MILLILITER(S): 9 INJECTION, SOLUTION INTRAMUSCULAR; INTRAVENOUS; SUBCUTANEOUS at 17:47

## 2024-10-20 RX ADMIN — MORPHINE SULFATE 5 MILLIGRAM(S): 30 TABLET, EXTENDED RELEASE ORAL at 06:50

## 2024-10-20 RX ADMIN — PREGABALIN 100 MILLIGRAM(S): 150 CAPSULE ORAL at 06:29

## 2024-10-20 RX ADMIN — SODIUM CHLORIDE 1000 MILLILITER(S): 9 INJECTION, SOLUTION INTRAMUSCULAR; INTRAVENOUS; SUBCUTANEOUS at 15:33

## 2024-10-20 RX ADMIN — SODIUM CHLORIDE 1000 MILLILITER(S): 9 INJECTION, SOLUTION INTRAMUSCULAR; INTRAVENOUS; SUBCUTANEOUS at 14:07

## 2024-10-20 RX ADMIN — MONTELUKAST SODIUM 10 MILLIGRAM(S): 10 TABLET, FILM COATED ORAL at 11:30

## 2024-10-20 RX ADMIN — PREGABALIN 100 MILLIGRAM(S): 150 CAPSULE ORAL at 22:35

## 2024-10-20 RX ADMIN — Medication 40 MILLIGRAM(S): at 22:24

## 2024-10-20 RX ADMIN — SODIUM CHLORIDE 500 MILLILITER(S): 9 INJECTION, SOLUTION INTRAMUSCULAR; INTRAVENOUS; SUBCUTANEOUS at 17:47

## 2024-10-20 RX ADMIN — AZTREONAM 50 MILLIGRAM(S): KIT at 22:30

## 2024-10-20 RX ADMIN — MORPHINE SULFATE 5 MILLIGRAM(S): 30 TABLET, EXTENDED RELEASE ORAL at 17:02

## 2024-10-20 RX ADMIN — VANCOMYCIN HYDROCHLORIDE 166.67 MILLIGRAM(S): KIT at 23:56

## 2024-10-20 RX ADMIN — CYCLOBENZAPRINE HYDROCHLORIDE 5 MILLIGRAM(S): 30 CAPSULE, EXTENDED RELEASE ORAL at 22:38

## 2024-10-20 NOTE — CHART NOTE - NSCHARTNOTEFT_GEN_A_CORE
Checked on patient at 6pm as patient had another rectal temp of 102.6F. . /53 and 94% O2 sat.   Her CTH unremarkable. Duplex negative for dvt. Her labs are remarkable for lactate 2.6 and repeat 2.9. Mild leukocytosis. UA positive and culture sent. CXR with small right upper lobe infiltrate at this time.   -Currently on IV vanc + Aztreonam.     On my exam, she is AO3. Has inspiratory rales on the left side, tachycardiac 110bpm,  with +1 pitting LE edema. Sating 96% O2.   Denies headache, dizziness, nausea, chest pain, palpitations, abdominal pain. Admits to chills.       Spoke to hospitalist who recommended giving IVF 250cc bolus, thus far she received 2,750ml of NS. Then continue maintenance fluids of 75cchr.   -states given she is on IV empiric Abx then we will just need to follow up 9-10pm lactate.  -Signed out to overnight NP. Will follow up and communicate with nocturnist if continues to be elevated. Checked on patient at 6pm as patient had another rectal temp of 102.6F. . /53 and 94% O2 sat.   Her CTH unremarkable. Duplex negative for dvt. Her labs are remarkable for lactate 2.6 and repeat 2.9. Mild leukocytosis. UA positive and culture sent. CXR with small right upper lobe infiltrate at this time.   -Started on IV vanc + Aztreonam this morning.     On my exam, she is AO3. Has inspiratory rales on the left side, tachycardiac 110bpm,  with +1 pitting LE edema. Sating 96% O2.   Denies headache, dizziness, nausea, chest pain, palpitations, abdominal pain. Admits to chills.       Ordered IV tylenol. Spoke to hospitalist who recommended giving IVF 250cc bolus, thus far she received 2,750ml of NS. Then continue maintenance fluids of 75cchr.   -states given she is on IV empiric Abx then we will just need to follow up 9-10pm lactate.  -Signed out to overnight NP. Will follow up and communicate with nocturnist if continues to be elevated or it patient status changes.. Checked on patient at 6pm as patient had another rectal temp of 102.6F. . /53 and 94% O2 sat.   Her CTH unremarkable. Duplex negative for dvt. Her labs are remarkable for lactate 2.6 and repeat 2.9. Mild leukocytosis. UA positive and culture sent. CXR with small right upper lobe infiltrate at this time.   -Started on IV vanc + Aztreonam this morning.     On my exam, she is AO3. Has inspiratory rales on the left side, tachycardiac 110bpm,  with +1 pitting LE edema. Sating 96% O2.   Denies headache, dizziness, nausea, chest pain, palpitations, abdominal pain. Admits to chills.       Ordered IV tylenol. Spoke to hospitalist who recommended giving IVF 250cc bolus, thus far she received 2,750ml of NS. Then continue maintenance fluids of 75cchr.   -states given she is on IV empiric Abx then we will just need to follow up 9-10pm lactate.  -Signed out to overnight NP. Will follow up and communicate with nocturnist if continues to be elevated or it patient status changes..    will need to f/ blood cultures. UCx. Checked on patient at 6pm as patient had another rectal temp of 102.6F. . /53 and 94% O2 sat.   Her CTH unremarkable. Duplex negative for dvt. Her labs are remarkable for lactate 2.6 and repeat 2.9. Mild leukocytosis. UA positive and culture sent. CXR with small right upper lobe infiltrate at this time.   -Started on IV vanc + Aztreonam this morning.     On my exam, she is AO3. Has inspiratory rales on the left side, tachycardiac 110bpm,  with +1 pitting LE edema. Sating 96% O2.   Denies headache, dizziness, nausea, chest pain, palpitations, abdominal pain. Admits to chills.       Ordered IV tylenol. Spoke to hospitalist who recommended giving IVF 250cc bolus, thus far she received 2,750ml of NS. Then continue maintenance fluids of 75cchr.   -states given she is on IV empiric Abx then we will just need to follow up 9-10pm lactate.  -Signed out to overnight NP. Will follow up and communicate with nocturnist if continues to be elevated or it patient status changes..    10:20PM RN zulema lactate. Patient remains febrile. Placed cooling blanket order and spoke to RN supervisor. Next Tylenol order is due at 7am 10/21 given reached max dose on 10/20.       will need to f/ blood cultures. UCx. Checked on patient at 6pm as patient had another rectal temp of 102.6F. . /53 and 94% O2 sat.   Her CTH unremarkable. Duplex negative for dvt. Her labs are remarkable for lactate 2.6 and repeat 2.9. Mild leukocytosis. UA positive and culture sent. CXR with small right upper lobe infiltrate at this time.   -Started on IV vanc 1250mg BID + Aztreonam TID this morning.     On my exam, she is AO3. Has inspiratory rales on the left side, tachycardiac 110bpm,  with +1 pitting LE edema. Sating 96% O2.   Denies headache, dizziness, nausea, chest pain, palpitations, abdominal pain. Admits to chills.       Ordered IV tylenol. Spoke to hospitalist who recommended giving IVF 250cc bolus, thus far she received 2,750ml of NS. Then continue maintenance fluids of 75cchr.   -states given she is on IV empiric Abx then we will just need to follow up 9-10pm lactate.  -Signed out to overnight NP. Will follow up and communicate with nocturnist if continues to be elevated or it patient status changes..    -10:20PM RN zulema lactate. Resulted 1.7. Patient remains febrile. Placed cooling blanket order and spoke to RN and RN supervisor. Next Tylenol order is due at 7am 10/21.   -1:30AM 10/21. Went to check on patient. She does not have cooling blanket. Overnight NP spoke to RN supervisor about cooling blanket. Patient looks much improved (/72, , RA 95%) however still remains with a 102.3 F rectally and warm to touch. Spoke to nocturnist about any further recommendations re: fever, who spoke to pharmacy and ordered for additional 1g IV tylenol to be given now 2am.     will need to f/ blood cultures. UCx. Checked on patient at 6pm as patient had another rectal temp of 102.6F. . /53 and 94% O2 sat.   Her CTH unremarkable. Duplex negative for dvt. Her labs are remarkable for lactate 2.6 and repeat 2.9. Mild leukocytosis. UA positive and culture sent. CXR with small right upper lobe infiltrate at this time.   -Started on IV vanc 1250mg BID + Aztreonam TID this morning.     On my exam, she is AO3. Has inspiratory rales on the left side, tachycardiac 110bpm,  with +1 pitting LE edema. Sating 96% O2.   Denies headache, dizziness, nausea, chest pain, palpitations, abdominal pain. Admits to chills.       Ordered IV tylenol. Spoke to hospitalist who recommended giving IVF 250cc bolus, thus far she received 2,750ml of NS. Then continue maintenance fluids of 75cchr.   -states given she is on IV empiric Abx then we will just need to follow up 9-10pm lactate.  -Signed out to overnight NP. Will follow up and communicate with nocturnist if continues to be elevated or it patient status changes..    -10:20PM RN zulema lactate. Resulted 1.7. Patient remains febrile. Placed cooling blanket order and spoke to RN and RN supervisor. Next Tylenol order is due at 7am 10/21.   -1:50AM 10/21. Went to check on patient. She does not have cooling blanket. Overnight NP spoke to RN supervisor about cooling blanket. Patient looks much improved (/72, , RA 95%) however still remains with a 102.3 F rectally and warm to touch. Spoke to nocturnist about any further recommendations re: fever, who spoke to pharmacy and ordered for additional 1g IV tylenol to be given now 2am.     will need to f/ blood cultures. UCx. Checked on patient at 6pm as patient had another rectal temp of 102.6F. . /53 and 94% O2 sat.   Her CTH unremarkable. Duplex negative for dvt. Her labs are remarkable for lactate 2.6 and repeat 2.9. Mild leukocytosis. UA positive and culture sent. CXR with small right upper lobe infiltrate at this time.   -Started on IV vanc 1250mg BID + Aztreonam TID this morning.     On my exam, she is AO3. Has inspiratory rales on the left side, tachycardiac 110bpm,  with +1 pitting LE edema. Sating 96% O2.   Denies headache, dizziness, nausea, chest pain, palpitations, abdominal pain. Admits to chills.       Ordered IV tylenol. Spoke to hospitalist who recommended giving IVF 250cc bolus, thus far she received 2,750ml of NS. Then continue maintenance fluids of 75cchr.   -states given she is on IV empiric Abx then we will just need to follow up 9-10pm lactate.  -Signed out to overnight NP. Will follow up and communicate with nocturnist if continues to be elevated or it patient status changes..    -10:20PM RN zulema lactate. Resulted 1.7. Patient remains febrile. Placed cooling blanket order and spoke to RN and RN supervisor. Next Tylenol order is due at 7am 10/21.   -1:50AM 10/21. Went to check on patient. She does not have cooling blanket. Overnight NP spoke to RN supervisor about cooling blanket. Patient looks much improved (/72, , RA 95%) however still remains with a 102.3 F rectally and warm to touch. ROS continues to only be positive for chills. Spoke to nocturnist about any further recommendations re: fever, who spoke to pharmacy and ordered for additional 1g IV tylenol to be given now 2am.     will need to f/ blood cultures. UCx.  Given current source of infection likely PNA + UTI.

## 2024-10-20 NOTE — RAPID RESPONSE TEAM SUMMARY - NSSITUATIONBACKGROUNDRRT_GEN_ALL_CORE
RRT called for rectal temperature of 103F. HR up 115. /80, SpO2 95%.  73 y/o F with PMH OA, HTN, gastroparesis, peripheral neuropathy, multiple prior thoracolumbar spine surgeries for congenital scoliosis [place] >[place] w/ removal of hardware (1991) with T12 three column spine fracture-malalignment with fracture extending to the adjacent right 12th posterior rib and left T12-L1 facet joint and acute, displaced fracture of the left eighth lateral rib. Chronic appearing bilateral rib deformities s/p S/P T9-L3 open fusion with PSO/lag screw partial corpectomy/interbody and Plastic Surgery closure by Dr. Elias on 9/29/24. Post op course c/b CSK leak s/p repair, new onset paroxsymal Afib with RVR,  increased pain and drainage from surgical incision, development of pleural effusions, symptomatic orthostatic hypotension, urinary retention, post op anemia, and chest pain with spontaneous resolution.     RRT called for rectal temperature of 103.9F, HR of 115 and increased confusion when compared to this morning. /80, SpO2 95%. On exam, pt is A+Ox2 (earlier this morning she was A+0x3), tachycardia, no murmurs, regular rate and rhythm, lungs CTA b/l, abdomen soft/NT/ND, midline incision is clean/dry/intact, no purulence, no lower extremity swelling, no slurred speech, no facial droop, motor strength 5/5 throughout bilaterally, sensation intact, no pronator drift. EKG: Sinus tachycardia with  with PACs, no ST changes, no T wave inversions (personally reviewed).     Diagnosis:   SIRS positive, unclear etiology (possible UTI vs. bacteremia, clinically doubt post operative infection)    Acute metabolic encephalopathy secondary to likely infectious etiology

## 2024-10-20 NOTE — RAPID RESPONSE TEAM SUMMARY - NSOTHERINTERVENTIONSRRT_GEN_ALL_CORE
Following up CBC, BMP, lactic acid, BCx x 2, UA, UCx, CXR, CT head   Bladder scans Q8H   ID consulted

## 2024-10-20 NOTE — PROGRESS NOTE ADULT - SUBJECTIVE AND OBJECTIVE BOX
Cc: Impaired mobility    HPI: Patient resting comfortably.  Denies any new weakness.  No new numbness.    Has been tolerating rehabilitation program.    acetaminophen     Tablet .. 975 milliGRAM(s) Oral every 8 hours  atorvastatin 40 milliGRAM(s) Oral at bedtime  calamine/zinc oxide Lotion 1 Application(s) Topical daily  cyclobenzaprine 5 milliGRAM(s) Oral three times a day PRN  diphenhydrAMINE 25 milliGRAM(s) Oral every 8 hours PRN  DULoxetine 20 milliGRAM(s) Oral daily  enoxaparin Injectable 40 milliGRAM(s) SubCutaneous <User Schedule>  ergocalciferol 77088 Unit(s) Oral every week  erythromycin    ethylsuccinate Suspension 40 mG/mL 128 milliGRAM(s) Oral every 12 hours  melatonin 6 milliGRAM(s) Oral at bedtime  metoprolol tartrate 12.5 milliGRAM(s) Oral every 12 hours  midodrine. 7.5 milliGRAM(s) Oral <User Schedule>  montelukast 10 milliGRAM(s) Oral daily  morphine   Solution 5 milliGRAM(s) Oral every 4 hours PRN  multivitamin 1 Tablet(s) Oral daily  polyethylene glycol 3350 17 Gram(s) Oral two times a day  pramipexole 3.75 milliGRAM(s) Oral at bedtime  pramipexole 1 milliGRAM(s) Oral <User Schedule>  pregabalin 100 milliGRAM(s) Oral three times a day  senna 2 Tablet(s) Oral at bedtime  sodium chloride 0.65% Nasal 1 Spray(s) Both Nostrils two times a day PRN  tamsulosin 0.4 milliGRAM(s) Oral at bedtime  Vonoprazan (Voquezna) 10mg tablet 1 Tablet(s) 1 Tablet(s) Oral daily      T(C): 36.8 (10-20-24 @ 09:03), Max: 37 (10-19-24 @ 20:13)  HR: 91 (10-20-24 @ 09:03) (73 - 91)  BP: 116/72 (10-20-24 @ 09:03) (116/72 - 138/79)  RR: 15 (10-20-24 @ 09:03) (15 - 17)  SpO2: 95% (10-20-24 @ 09:03) (95% - 95%)      In NAD  HEENT- EOMI  Heart- No cyanosis  Lungs- No respiratory distress  Abd- NT  Ext- No calf pain  Neuro- Exam unchanged                Imp: Patient with diagnosis of Traumatic T11-12 vertebral fracture s/p corpectomy and fusion surgery S/P T9-L3 open fusion with PSO/lag screw partial corpectomy/interbody with complex plastic closure admitted for comprehensive acute rehabilitation.    Plan:  -Impaired mobility - Continue PT/OT, TLSO  - DVT prophylaxis - Lovenox  - Skin- Turn q2h, check skin daily  - Continue current medications  -Active issues-   -Neuropathic pain -  pregabalin   -HLD -  atorvastatin  -Constipation/bowel program -  miralax   -Appreciate internal medicine consultation  -Continue current rehabilitation program.

## 2024-10-20 NOTE — PROGRESS NOTE ADULT - SUBJECTIVE AND OBJECTIVE BOX
CC: Patient is a 74y old  Female who presents with a chief complaint of Traumatic T11-12 vertebral fracture s/p corpectomy and fusion surgery (20 Oct 2024 10:17)      Interval History:  Patient seen and examined at bedside.  Pt states that she had pain on the right side of the back overnight.   She reports having LLE tenderness.   Last bowel movement was yesterday.    ALLERGIES:  erythromycin (Stomach Upset; Vomiting; Nausea)  Dilantin (Urticaria)  penicillins (Urticaria)    MEDICATIONS  (STANDING):  acetaminophen     Tablet .. 975 milliGRAM(s) Oral every 8 hours  atorvastatin 40 milliGRAM(s) Oral at bedtime  calamine/zinc oxide Lotion 1 Application(s) Topical daily  DULoxetine 20 milliGRAM(s) Oral daily  enoxaparin Injectable 40 milliGRAM(s) SubCutaneous <User Schedule>  ergocalciferol 39204 Unit(s) Oral every week  erythromycin    ethylsuccinate Suspension 40 mG/mL 128 milliGRAM(s) Oral every 12 hours  melatonin 6 milliGRAM(s) Oral at bedtime  metoprolol tartrate 12.5 milliGRAM(s) Oral every 12 hours  midodrine. 7.5 milliGRAM(s) Oral <User Schedule>  montelukast 10 milliGRAM(s) Oral daily  multivitamin 1 Tablet(s) Oral daily  polyethylene glycol 3350 17 Gram(s) Oral two times a day  pramipexole 3.75 milliGRAM(s) Oral at bedtime  pramipexole 1 milliGRAM(s) Oral <User Schedule>  pregabalin 100 milliGRAM(s) Oral three times a day  senna 2 Tablet(s) Oral at bedtime  tamsulosin 0.4 milliGRAM(s) Oral at bedtime  Vonoprazan (Voquezna) 10mg tablet 1 Tablet(s) 1 Tablet(s) Oral daily    MEDICATIONS  (PRN):  cyclobenzaprine 5 milliGRAM(s) Oral three times a day PRN Muscle Spasm  diphenhydrAMINE 25 milliGRAM(s) Oral every 8 hours PRN Rash and/or Itching  morphine   Solution 5 milliGRAM(s) Oral every 4 hours PRN Severe Pain (7 - 10)  sodium chloride 0.65% Nasal 1 Spray(s) Both Nostrils two times a day PRN Nasal Congestion    Vital Signs Last 24 Hrs  T(F): 98.2 (20 Oct 2024 09:03), Max: 98.6 (19 Oct 2024 20:13)  HR: 91 (20 Oct 2024 09:03) (73 - 91)  BP: 116/72 (20 Oct 2024 09:03) (116/72 - 138/79)  RR: 15 (20 Oct 2024 09:03) (15 - 17)  SpO2: 95% (20 Oct 2024 09:03) (95% - 95%)  I&O's Summary    BMI (kg/m2): 38.4 (10-18-24 @ 12:38)    Review of Systems:   Constitutional: negative for fatigue, negative for fever, negative for chills, negative for decreased appetite.  Skin: negative for rashes, negative for open wounds, negative for jaundice.   Eyes: negative for blurry vision, negative for double vision.   Ears, nose, throat: negative for ear pain, negative for nasal congestion, negative for sore throat, negative for lymph node swelling.   Cardiovascular: negative for chest pain, negative for palpitations, negative for lower extremity swelling.   Respiratory: negative for shortness of breath, negative for wheezing, negative for cough.   Gastrointestinal: negative for abdominal pain, negative for nausea, negative for vomiting, negative for diarrhea, negative for constipation, negative for blood in the stool, negative for black tarry stools.   Genitourinary: negative for burning on urination, negative for urinary urgency or frequency, negative for blood in the urine.   Endocrine: negative for cold intolerance, negative for heat intolerance, negative for increased thirst.   Hematologic: negative for easy bruising or bleeding.   Musculoskeletal: positive for muscle/joint pain, negative for decreased range of motion.   Neurological: negative for dizziness, negative for headaches, negative for loss of consciousness, negative for motor weakness, negative for sensory deficits.   Psychiatric: negative for depression, negative for anxiety.     PHYSICAL EXAM:  General: Awake and alert, cooperative with exam. No acute distress.   Skin: Warm, dry, and pink.   Eyes: Pupils equal and reactive to light. Extraocular eye movements intact. No conjunctival injection, discharge, or scleral icterus.   HEENT: Atraumatic, normocephalic. Moist mucus membranes.   Cardiology: Normal S1, S2. No murmurs, rubs, or gallops. Regular rate and rhythm.   Respiratory: Lungs clear to ascultation bilaterally. Good air exchange. No wheezes, rales, or rhonchi. Normal chest expansion.   Gastrointestinal: Positive bowel sounds. Soft, non-tender, non-distended. No guarding, rigidity, or rebound tenderness. No hepatosplenomegaly.   Musculoskeletal: 5/5 motor strength in all extremities. Normal range of motion.   Extremities: No peripheral edema bilaterally. Dorsalis pedis pulses 2+ bilaterally.   Neurological: A+Ox3 (person, place, and time). Cranial nerves 2-12 intact. Normal speech. No facial droop. No focal neurological deficits.   Psychiatric: Normal affect. Normal mood.     LABS:                        9.7    5.87  )-----------( 489      ( 18 Oct 2024 09:12 )             30.7       10-18    137  |  98  |  29  ----------------------------<  127  3.3   |  30  |  0.69    Ca    8.5      18 Oct 2024 09:12    TPro  6.6  /  Alb  3.0  /  TBili  0.4  /  DBili  x   /  AST  35  /  ALT  60  /  AlkPhos  247  10-18                                  Urinalysis Basic - ( 18 Oct 2024 09:12 )    Color: x / Appearance: x / SG: x / pH: x  Gluc: 127 mg/dL / Ketone: x  / Bili: x / Urobili: x   Blood: x / Protein: x / Nitrite: x   Leuk Esterase: x / RBC: x / WBC x   Sq Epi: x / Non Sq Epi: x / Bacteria: x        COVID-19 PCR: NotDetec (10-17-24 @ 16:34)      Care Discussed with Consultants/Other Providers: Yes

## 2024-10-20 NOTE — PROGRESS NOTE ADULT - ASSESSMENT
Mrs. Haley French is a 74-year-old female patient with past medical history of OA, HTN, gastroparesis, peripheral neuropathy, multiple prior thoracolumbar spine surgeries for congenital scoliosis [place] >[place] w/ removal of hardware (1991) who presented to [facility] on 9/28/24 with back pain and left hip pain s/p mechanical fall. CT imaging was performed and reported a T12 three column spine fracture-malalignment with fracture extending to the adjacent right 12th posterior rib and left T12-L1 facet joint and acute, displaced fracture of the left eighth lateral rib. Chronic appearing bilateral rib deformities. Surgical management was recommended and she is S/P T9-L3 open fusion with PSO/lag screw partial corpectomy/interbody by Dr. Stroud, Neurosurgeon with complex Plastic Surgery closure by Dr. Elias on 9/29/24. Post op course c/b CSK leak s/p repair, new onset paroxsymal Afib with RVR,  increased pain and drainage from surgical incision, development of pleural effusions, symptomatic orthostatic hypotension, urinary retention, post op anemia, and chest pain with spontaneous resolution. Cardiac work up negative. Patient was evaluated by PM&R and therapy for functional deficits, gait/ADL impairments and acute rehabilitation was recommended. Patient was cleared for discharge to [facility] on 10/16/24.    #Traumatic T11-12 vertebral fracture s/p corpectomy and fusion surgery   - S/P T9-L3 open fusion with PSO/lag screw partial corpectomy/interbody with complex plastic closure (w/ Dr. Stroud and Dr. Tinajero on 9/29)   - CT Lumbar Spine (9/28/24): T12 three column spine fracture-malalignment with fracture extending to the adjacent right 12th posterior rib and left T12-L1 facet joint. and acute displaced fracture of the left eighth lateral rib.  - CT Lumbar Spine (10/5/24): Status post spinal fusion of T9-L3 with bilateral rods and screws and cylindrical cage within the fractured T12 vertebral body. Resection of the LEFT T12 pedicle and a LEFT hemilaminectomy. Hardware appears intact. No evidence of hardware loosening. Expected postoperative changes.   - TLSO when OOB  - Pain management: Tylenol 975mg ATC, Duloxetine 20mg daily, Lyrica 100mg TID , Flexeril 5mg TID PRN  - pt/ot/dvt ppx/ pain meds    #LLE tenderness on palpation   - Doppler ultrasound (10/15/24): No evidence of deep venous thrombosis in either lower extremity.    # PAF  #HTN  #Orthostatic hypotension  - TTE 61%  - S/P 1L IVF bolus (10/13) for symptomatic hypotension  - HOLDING Losartan 100 mg daily and Chlorthalidone 25 mg daily (restart if SBP is consistently above 140 per cardio)  - Continue Metoprolol to 12.5 BID  - Continue Midodrine 7.5 mg AM (wean off as tolerated)   - Encouraged PO intake   - Monitor, if continue to be in A-fib, will need full dose AC per cardio    #Seasonal allergies/Nasal congestion  - Singulair     #HLD  - Atorvastatin     #Hyponatremia (resolved)  - Likely 2/2 SIADH in setting of pain and poor solute intake  - Liberalize fluid restriction in setting of soft BP  - Monitor Na     #Post op Anemia   - S/p PRBCs intraoperatively   - Hb stable with no evidence of active bleed at this time.   - Monitors    #SILVANA (obstructive sleep apnea).   - Transient desaturations on RA    - c/w Nocturnal 2L O2 and PRN during day during naps, monitor sats   - F/u with PCP outpatient as may benefit from sleep study and CPAP.    #Pleural effusion  - 10/10 CT Abd/pelvis- neg for GI bleed  - S/p one dose of Lasix with significant output  - repeat CXR 10/15 with resolution of pleural effusions    #Liver cyst  -10/10 CT Abd/pelvis- There is a large cyst, stable since prior exam. There are small hypodense foci on segment II and segment I too small to characterize, unchanged since prior.  -Outpatient f/u     #Restless Leg Syndrome  -Pramipexole     #Elevated alkaline phosphatase level   - monitor     #Depression  -Duloxetine     # Gastritis-  Pantoprazole  Voquenza  , Eryped     #Urinary retention  - PVR q 8 hours (SC if > 400)  - Flomax     #DVT ppx: Lovenox     will follow    d/w rehab team at [facility]

## 2024-10-20 NOTE — RAPID RESPONSE TEAM SUMMARY - NSMEDICATIONSRRT_GEN_ALL_CORE
Given 30 cc/kg of normal saline and IV Tylenol. Started Aztreonam, 1 dose of Vancomycin.  Given 30 cc/kg (2500 ml) of normal saline   IV Tylenol  Started Aztreonam, 1 dose of Vancomycin (pt has a penicillin allergy)

## 2024-10-20 NOTE — CHART NOTE - NSCHARTNOTEFT_GEN_A_CORE
Rapid response called due to sepsis. Patient on initial presentation was confused and mumbling words. Patient was not oriented to specific location but oriented to self, date and time. Patient was also experiencing symptoms of shivers and sensation of cold. Denies SOB/Dyspnea, N/V, abdominal pain or cough. Leukocytosis to 10.58. Rectal Temp 103.9, HR 115bpm and /83. Midline surgical incision in lower back c/d/i and no signs of drainage, bleeding or purulence     Vital Signs Last 24 Hrs  T(C): 39.9 (20 Oct 2024 12:46), Max: 39.9 (20 Oct 2024 12:46)  T(F): 103.9 (20 Oct 2024 12:46), Max: 103.9 (20 Oct 2024 12:46)  HR: 115 (20 Oct 2024 12:46) (73 - 115)  BP: 130/69 (20 Oct 2024 12:46) (116/72 - 138/79)  BP(mean): --  RR: 15 (20 Oct 2024 09:03) (15 - 17)  SpO2: 95% (20 Oct 2024 09:03) (95% - 95%)    Parameters below as of 20 Oct 2024 09:03  Patient On (Oxygen Delivery Method): room air      PE:   Gen: AAOX2, oriented to self and date/time and not oriented to specific location   Card: Irregular rate and rhythm. no m/r/g  GI: soft, nontender abdomen  Resp: CTAB    A/P:     Mrs. Haley French is a 74-year-old female patient with past medical history of OA, HTN, gastroparesis, peripheral neuropathy, multiple prior thoracolumbar spine surgeries for congenital scoliosis [place] w/ [place] of [place] (1991) who presented to [facility] on 9/28/24 with back pain and left hip pain s/p mechanical fall. S/P T9-L3 open fusion with PSO/lag screw partial corpectomy/interbody  with complex Plastic Surgery closure  on 9/29/24. Post op course c/b CSK leak s/p repair, new onset paroxsymal Afib with RVR,  increased pain and drainage from surgical incision, development of pleural effusions, symptomatic orthostatic hypotension, urinary retention, post op anemia, and chest pain with spontaneous resolution.     -initiate NS fluids at 125cc/h   -Started course of Aztrenam 1000mg PO Q8 and 1x Vancomycin 1250mg IVPB  -f/u CT head   -f/u EKG   -f/u CXR   - f/u CBC/CMP  - f/u lactate   - f/u procal Rapid response called due to sepsis. Patient on initial presentation was confused and mumbling words. Patient was not oriented to specific location but oriented to self, date and time. Patient was also experiencing symptoms of shivers and sensation of cold. Denies SOB/Dyspnea, N/V, abdominal pain or cough. Leukocytosis to 10.58. Rectal Temp 103.9, HR 115bpm and /83. Midline surgical incision in lower back c/d/i and no signs of drainage, bleeding or purulence     Vital Signs Last 24 Hrs  T(C): 39.9 (20 Oct 2024 12:46), Max: 39.9 (20 Oct 2024 12:46)  T(F): 103.9 (20 Oct 2024 12:46), Max: 103.9 (20 Oct 2024 12:46)  HR: 115 (20 Oct 2024 12:46) (73 - 115)  BP: 130/69 (20 Oct 2024 12:46) (116/72 - 138/79)  BP(mean): --  RR: 15 (20 Oct 2024 09:03) (15 - 17)  SpO2: 95% (20 Oct 2024 09:03) (95% - 95%)    Parameters below as of 20 Oct 2024 09:03  Patient On (Oxygen Delivery Method): room air      PE:   Gen: AAOX2, oriented to self and date/time and not oriented to specific location   Card: Irregular rate and rhythm. no m/r/g  GI: soft, nontender abdomen  Resp: CTAB    A/P:     Mrs. Haley French is a 74-year-old female patient with past medical history of OA, HTN, gastroparesis, peripheral neuropathy, multiple prior thoracolumbar spine surgeries for congenital scoliosis done >[place] years ago [place] of [place] w/ removal of hardware (1991) who presented to [facility] on 9/28/24 with back pain and left hip pain s/p mechanical fall. S/P T9-L3 open fusion with PSO/lag screw partial corpectomy/interbody  with complex Plastic Surgery closure  on 9/29/24. Post op course c/b CSK leak s/p repair, new onset paroxsymal Afib with RVR,  increased pain and drainage from surgical incision, development of pleural effusions, symptomatic orthostatic hypotension, urinary retention, post op anemia, and chest pain with spontaneous resolution.     -initiate NS fluids at 125cc/h   -Started course of Aztrenam 1000mg PO Q8 and 1x Vancomycin 1250mg IVPB  -f/u CT head   -f/u EKG   -f/u CXR   - f/u CBC/CMP  - f/u lactate   - f/u procal    Discussed plan with hospitalist and on call attending Rapid response called due to sepsis. Patient on initial presentation was confused and mumbling words. Patient was not oriented to specific location but oriented to self, date and time. Patient was also experiencing symptoms of shivers and sensation of cold. Denies SOB/Dyspnea, N/V, abdominal pain or cough. Leukocytosis to 10.58. Rectal Temp 103.9, HR 115bpm and /83. Midline surgical incision in lower back c/d/i and no signs of drainage, bleeding or purulence. CT head negative.      Vital Signs Last 24 Hrs  T(C): 39.9 (20 Oct 2024 12:46), Max: 39.9 (20 Oct 2024 12:46)  T(F): 103.9 (20 Oct 2024 12:46), Max: 103.9 (20 Oct 2024 12:46)  HR: 115 (20 Oct 2024 12:46) (73 - 115)  BP: 130/69 (20 Oct 2024 12:46) (116/72 - 138/79)  BP(mean): --  RR: 15 (20 Oct 2024 09:03) (15 - 17)  SpO2: 95% (20 Oct 2024 09:03) (95% - 95%)    Parameters below as of 20 Oct 2024 09:03  Patient On (Oxygen Delivery Method): room air      PE:   Gen: AAOX2, oriented to self and date/time and not oriented to specific location   Card: Irregular rate and rhythm. no m/r/g  GI: soft, nontender abdomen  Resp: CTAB    A/P:     Mrs. Haley French is a 74-year-old female patient with past medical history of OA, HTN, gastroparesis, peripheral neuropathy, multiple prior thoracolumbar spine surgeries for congenital scoliosis done >10 years ago [place] of [place] w/ removal of hardware (1991) who presented to [facility] on 9/28/24 with back pain and left hip pain s/p mechanical fall. S/P T9-L3 open fusion with PSO/lag screw partial corpectomy/interbody  with complex Plastic Surgery closure  on 9/29/24. Post op course c/b CSK leak s/p repair, new onset paroxsymal Afib with RVR,  increased pain and drainage from surgical incision, development of pleural effusions, symptomatic orthostatic hypotension, urinary retention, post op anemia, and chest pain with spontaneous resolution.     -initiate NS fluids at 125cc/h   -Started course of Aztrenam 1000mg PO Q8 and 1x Vancomycin 1250mg IVPB  -f/u CT head   -f/u EKG   -f/u CXR   - f/u CBC/CMP  - f/u lactate   - f/u procal    Discussed plan with son Mukesh Josseline, hospitalist and on call attending

## 2024-10-21 ENCOUNTER — TRANSCRIPTION ENCOUNTER (OUTPATIENT)
Age: 74
End: 2024-10-21

## 2024-10-21 LAB
ALBUMIN SERPL ELPH-MCNC: 2.4 G/DL — LOW (ref 3.3–5)
ALP SERPL-CCNC: 172 U/L — HIGH (ref 40–120)
ALT FLD-CCNC: 33 U/L — SIGNIFICANT CHANGE UP (ref 10–45)
ANION GAP SERPL CALC-SCNC: 11 MMOL/L — SIGNIFICANT CHANGE UP (ref 5–17)
AST SERPL-CCNC: 23 U/L — SIGNIFICANT CHANGE UP (ref 10–40)
BASOPHILS # BLD AUTO: 0.05 K/UL — SIGNIFICANT CHANGE UP (ref 0–0.2)
BASOPHILS NFR BLD AUTO: 0.5 % — SIGNIFICANT CHANGE UP (ref 0–2)
BILIRUB SERPL-MCNC: 0.8 MG/DL — SIGNIFICANT CHANGE UP (ref 0.2–1.2)
BUN SERPL-MCNC: 13 MG/DL — SIGNIFICANT CHANGE UP (ref 7–23)
CALCIUM SERPL-MCNC: 7.6 MG/DL — LOW (ref 8.4–10.5)
CHLORIDE SERPL-SCNC: 103 MMOL/L — SIGNIFICANT CHANGE UP (ref 96–108)
CO2 SERPL-SCNC: 23 MMOL/L — SIGNIFICANT CHANGE UP (ref 22–31)
CREAT SERPL-MCNC: 0.79 MG/DL — SIGNIFICANT CHANGE UP (ref 0.5–1.3)
CRP SERPL-MCNC: 183 MG/L — HIGH
EGFR: 77 ML/MIN/1.73M2 — SIGNIFICANT CHANGE UP
EOSINOPHIL # BLD AUTO: 0.01 K/UL — SIGNIFICANT CHANGE UP (ref 0–0.5)
EOSINOPHIL NFR BLD AUTO: 0.1 % — SIGNIFICANT CHANGE UP (ref 0–6)
ERYTHROCYTE [SEDIMENTATION RATE] IN BLOOD: 61 MM/HR — HIGH (ref 0–20)
GLUCOSE SERPL-MCNC: 118 MG/DL — HIGH (ref 70–99)
GRAM STN FLD: ABNORMAL
HCT VFR BLD CALC: 26.8 % — LOW (ref 34.5–45)
HGB BLD-MCNC: 8.6 G/DL — LOW (ref 11.5–15.5)
IMM GRANULOCYTES NFR BLD AUTO: 0.5 % — SIGNIFICANT CHANGE UP (ref 0–0.9)
LACTATE SERPL-SCNC: 1.4 MMOL/L — SIGNIFICANT CHANGE UP (ref 0.7–2)
LYMPHOCYTES # BLD AUTO: 1.14 K/UL — SIGNIFICANT CHANGE UP (ref 1–3.3)
LYMPHOCYTES # BLD AUTO: 10.9 % — LOW (ref 13–44)
MCHC RBC-ENTMCNC: 28.3 PG — SIGNIFICANT CHANGE UP (ref 27–34)
MCHC RBC-ENTMCNC: 32.1 GM/DL — SIGNIFICANT CHANGE UP (ref 32–36)
MCV RBC AUTO: 88.2 FL — SIGNIFICANT CHANGE UP (ref 80–100)
METHOD TYPE: SIGNIFICANT CHANGE UP
MONOCYTES # BLD AUTO: 0.91 K/UL — HIGH (ref 0–0.9)
MONOCYTES NFR BLD AUTO: 8.7 % — SIGNIFICANT CHANGE UP (ref 2–14)
MSSA DNA SPEC QL NAA+PROBE: SIGNIFICANT CHANGE UP
NEUTROPHILS # BLD AUTO: 8.34 K/UL — HIGH (ref 1.8–7.4)
NEUTROPHILS NFR BLD AUTO: 79.3 % — HIGH (ref 43–77)
NRBC # BLD: 0 /100 WBCS — SIGNIFICANT CHANGE UP (ref 0–0)
PLATELET # BLD AUTO: 377 K/UL — SIGNIFICANT CHANGE UP (ref 150–400)
POTASSIUM SERPL-MCNC: 3 MMOL/L — LOW (ref 3.5–5.3)
POTASSIUM SERPL-SCNC: 3 MMOL/L — LOW (ref 3.5–5.3)
PROT SERPL-MCNC: 5.5 G/DL — LOW (ref 6–8.3)
RBC # BLD: 3.04 M/UL — LOW (ref 3.8–5.2)
RBC # FLD: 15.6 % — HIGH (ref 10.3–14.5)
SARS-COV-2 RNA SPEC QL NAA+PROBE: SIGNIFICANT CHANGE UP
SODIUM SERPL-SCNC: 137 MMOL/L — SIGNIFICANT CHANGE UP (ref 135–145)
SPECIMEN SOURCE: SIGNIFICANT CHANGE UP
SPECIMEN SOURCE: SIGNIFICANT CHANGE UP
WBC # BLD: 10.5 K/UL — SIGNIFICANT CHANGE UP (ref 3.8–10.5)
WBC # FLD AUTO: 10.5 K/UL — SIGNIFICANT CHANGE UP (ref 3.8–10.5)

## 2024-10-21 PROCEDURE — 72131 CT LUMBAR SPINE W/O DYE: CPT | Mod: 26

## 2024-10-21 PROCEDURE — 99233 SBSQ HOSP IP/OBS HIGH 50: CPT

## 2024-10-21 PROCEDURE — 72128 CT CHEST SPINE W/O DYE: CPT | Mod: 26

## 2024-10-21 PROCEDURE — 99232 SBSQ HOSP IP/OBS MODERATE 35: CPT

## 2024-10-21 PROCEDURE — 93010 ELECTROCARDIOGRAM REPORT: CPT

## 2024-10-21 RX ORDER — PETROLATUM 987.89 MG/G
1 OINTMENT TOPICAL
Refills: 0 | Status: DISCONTINUED | OUTPATIENT
Start: 2024-10-21 | End: 2024-10-23

## 2024-10-21 RX ORDER — MORPHINE SULFATE 30 MG/1
2.5 TABLET, EXTENDED RELEASE ORAL
Qty: 0 | Refills: 0 | DISCHARGE
Start: 2024-10-21

## 2024-10-21 RX ORDER — MORPHINE SULFATE 30 MG/1
5 TABLET, EXTENDED RELEASE ORAL EVERY 4 HOURS
Refills: 0 | Status: DISCONTINUED | OUTPATIENT
Start: 2024-10-21 | End: 2024-10-23

## 2024-10-21 RX ORDER — ACETAMINOPHEN 500 MG
100 TABLET ORAL
Qty: 0 | Refills: 0 | DISCHARGE
Start: 2024-10-21

## 2024-10-21 RX ORDER — ACETAMINOPHEN 500 MG
3 TABLET ORAL
Qty: 0 | Refills: 0 | DISCHARGE
Start: 2024-10-21

## 2024-10-21 RX ORDER — ACETAMINOPHEN 500 MG
1000 TABLET ORAL ONCE
Refills: 0 | Status: COMPLETED | OUTPATIENT
Start: 2024-10-21 | End: 2024-10-21

## 2024-10-21 RX ORDER — TAMSULOSIN HCL 0.4 MG
1 CAPSULE ORAL
Qty: 0 | Refills: 0 | DISCHARGE
Start: 2024-10-21

## 2024-10-21 RX ORDER — MONTELUKAST SODIUM 10 MG/1
1 TABLET, FILM COATED ORAL
Qty: 0 | Refills: 0 | DISCHARGE
Start: 2024-10-21

## 2024-10-21 RX ORDER — PRAMIPEXOLE DIHYDROCHLORIDE 0.12 MG/1
1 TABLET ORAL
Qty: 0 | Refills: 0 | DISCHARGE
Start: 2024-10-21

## 2024-10-21 RX ORDER — PRAMIPEXOLE DIHYDROCHLORIDE 0.12 MG/1
5 TABLET ORAL
Qty: 0 | Refills: 0 | DISCHARGE
Start: 2024-10-21

## 2024-10-21 RX ORDER — DULOXETINE HYDROCHLORIDE 30 MG/1
1 CAPSULE, DELAYED RELEASE ORAL
Qty: 0 | Refills: 0 | DISCHARGE
Start: 2024-10-21

## 2024-10-21 RX ORDER — SENNA 187 MG
2 TABLET ORAL
Qty: 0 | Refills: 0 | DISCHARGE
Start: 2024-10-21

## 2024-10-21 RX ORDER — MELATONIN 5 MG
2 TABLET ORAL
Qty: 0 | Refills: 0 | DISCHARGE
Start: 2024-10-21

## 2024-10-21 RX ORDER — AZTREONAM 75 MG/ML
1 KIT INHALATION
Qty: 30 | Refills: 0
Start: 2024-10-21 | End: 2024-10-30

## 2024-10-21 RX ORDER — AZTREONAM 75 MG/ML
1 KIT INHALATION
Qty: 1 | Refills: 0
Start: 2024-10-21

## 2024-10-21 RX ORDER — CALAMINE
1 LOTION (ML) TOPICAL
Qty: 0 | Refills: 0 | DISCHARGE
Start: 2024-10-21

## 2024-10-21 RX ORDER — VANCOMYCIN HYDROCHLORIDE 50 MG/ML
1.25 KIT ORAL
Qty: 0 | Refills: 0 | DISCHARGE
Start: 2024-10-21

## 2024-10-21 RX ORDER — VONOPRAZAN FUMARATE 26.72 MG/1
1 TABLET ORAL
Qty: 0 | Refills: 0 | DISCHARGE

## 2024-10-21 RX ORDER — PREGABALIN 150 MG/1
1 CAPSULE ORAL
Qty: 0 | Refills: 0 | DISCHARGE
Start: 2024-10-21

## 2024-10-21 RX ORDER — PREGABALIN 150 MG/1
100 CAPSULE ORAL THREE TIMES A DAY
Refills: 0 | Status: DISCONTINUED | OUTPATIENT
Start: 2024-10-21 | End: 2024-10-23

## 2024-10-21 RX ORDER — B-COMPLEX WITH VITAMIN C
1 VIAL (ML) INJECTION
Qty: 0 | Refills: 0 | DISCHARGE
Start: 2024-10-21

## 2024-10-21 RX ORDER — CYCLOBENZAPRINE HYDROCHLORIDE 30 MG/1
1 CAPSULE, EXTENDED RELEASE ORAL
Qty: 0 | Refills: 0 | DISCHARGE
Start: 2024-10-21

## 2024-10-21 RX ORDER — POLYETHYLENE GLYCOL 3350 17 G/17G
17 POWDER, FOR SOLUTION ORAL
Qty: 0 | Refills: 0 | DISCHARGE
Start: 2024-10-21

## 2024-10-21 RX ORDER — MIDODRINE HYDROCHLORIDE 2.5 MG/1
3 TABLET ORAL
Qty: 0 | Refills: 0 | DISCHARGE
Start: 2024-10-21

## 2024-10-21 RX ORDER — VONOPRAZAN FUMARATE 26.72 MG/1
1 TABLET ORAL
Qty: 1 | Refills: 0
Start: 2024-10-21

## 2024-10-21 RX ORDER — ACETAMINOPHEN 500 MG
975 TABLET ORAL EVERY 8 HOURS
Refills: 0 | Status: DISCONTINUED | OUTPATIENT
Start: 2024-10-21 | End: 2024-10-23

## 2024-10-21 RX ORDER — CALCIUM CARBONATE 215(500)MG
0 TABLET,CHEWABLE ORAL
Qty: 0 | Refills: 0 | DISCHARGE
Start: 2024-10-21

## 2024-10-21 RX ORDER — DIPHENHYDRAMINE HCL 12.5MG/5ML
1 ELIXIR ORAL
Qty: 0 | Refills: 0 | DISCHARGE
Start: 2024-10-21

## 2024-10-21 RX ORDER — PRAMIPEXOLE DIHYDROCHLORIDE 0.5 MG/1
3.75 TABLET ORAL
Qty: 0 | Refills: 0 | DISCHARGE

## 2024-10-21 RX ADMIN — Medication 25 MILLIGRAM(S): at 23:21

## 2024-10-21 RX ADMIN — VANCOMYCIN HYDROCHLORIDE 166.67 MILLIGRAM(S): KIT at 23:44

## 2024-10-21 RX ADMIN — MONTELUKAST SODIUM 10 MILLIGRAM(S): 10 TABLET, FILM COATED ORAL at 11:26

## 2024-10-21 RX ADMIN — Medication 975 MILLIGRAM(S): at 22:08

## 2024-10-21 RX ADMIN — Medication 975 MILLIGRAM(S): at 15:15

## 2024-10-21 RX ADMIN — PREGABALIN 100 MILLIGRAM(S): 150 CAPSULE ORAL at 22:12

## 2024-10-21 RX ADMIN — MIDODRINE HYDROCHLORIDE 7.5 MILLIGRAM(S): 2.5 TABLET ORAL at 05:17

## 2024-10-21 RX ADMIN — AZTREONAM 50 MILLIGRAM(S): KIT at 15:16

## 2024-10-21 RX ADMIN — VANCOMYCIN HYDROCHLORIDE 166.67 MILLIGRAM(S): KIT at 13:08

## 2024-10-21 RX ADMIN — PRAMIPEXOLE DIHYDROCHLORIDE 3.75 MILLIGRAM(S): 0.12 TABLET ORAL at 23:20

## 2024-10-21 RX ADMIN — Medication 400 MILLIGRAM(S): at 11:36

## 2024-10-21 RX ADMIN — Medication 1000 MILLIGRAM(S): at 12:36

## 2024-10-21 RX ADMIN — Medication 1 TABLET(S): at 11:26

## 2024-10-21 RX ADMIN — PREGABALIN 100 MILLIGRAM(S): 150 CAPSULE ORAL at 15:15

## 2024-10-21 RX ADMIN — Medication 40 MILLIGRAM(S): at 22:12

## 2024-10-21 RX ADMIN — Medication 50000 UNIT(S): at 11:25

## 2024-10-21 RX ADMIN — MORPHINE SULFATE 5 MILLIGRAM(S): 30 TABLET, EXTENDED RELEASE ORAL at 00:34

## 2024-10-21 RX ADMIN — Medication 128 MILLIGRAM(S): at 18:43

## 2024-10-21 RX ADMIN — Medication 12.5 MILLIGRAM(S): at 20:38

## 2024-10-21 RX ADMIN — Medication 6 MILLIGRAM(S): at 22:12

## 2024-10-21 RX ADMIN — Medication 40 MILLIGRAM(S): at 18:23

## 2024-10-21 RX ADMIN — Medication 400 MILLIGRAM(S): at 03:04

## 2024-10-21 RX ADMIN — Medication 128 MILLIGRAM(S): at 06:57

## 2024-10-21 RX ADMIN — Medication 0.4 MILLIGRAM(S): at 22:12

## 2024-10-21 RX ADMIN — Medication 975 MILLIGRAM(S): at 16:15

## 2024-10-21 RX ADMIN — Medication 1000 MILLIGRAM(S): at 06:34

## 2024-10-21 RX ADMIN — PRAMIPEXOLE DIHYDROCHLORIDE 1 MILLIGRAM(S): 0.12 TABLET ORAL at 05:16

## 2024-10-21 RX ADMIN — DULOXETINE HYDROCHLORIDE 20 MILLIGRAM(S): 30 CAPSULE, DELAYED RELEASE ORAL at 11:26

## 2024-10-21 RX ADMIN — CYCLOBENZAPRINE HYDROCHLORIDE 5 MILLIGRAM(S): 30 CAPSULE, EXTENDED RELEASE ORAL at 11:26

## 2024-10-21 RX ADMIN — AZTREONAM 50 MILLIGRAM(S): KIT at 05:18

## 2024-10-21 RX ADMIN — Medication 12.5 MILLIGRAM(S): at 05:16

## 2024-10-21 RX ADMIN — AZTREONAM 50 MILLIGRAM(S): KIT at 22:21

## 2024-10-21 RX ADMIN — Medication 975 MILLIGRAM(S): at 22:38

## 2024-10-21 RX ADMIN — CYCLOBENZAPRINE HYDROCHLORIDE 5 MILLIGRAM(S): 30 CAPSULE, EXTENDED RELEASE ORAL at 20:38

## 2024-10-21 NOTE — CONSULT NOTE ADULT - REASON FOR ADMISSION
Traumatic T11-12 vertebral fracture s/p corpectomy and fusion surgery
Traumatic T11-12 vertebral fracture s/p corpectomy and fusion surgery

## 2024-10-21 NOTE — DISCHARGE NOTE PROVIDER - NSDCMRMEDTOKEN_GEN_ALL_CORE_FT
acetaminophen 10 mg/mL intravenous solution: 100 milliliter(s) intravenous once  acetaminophen 325 mg oral tablet: 3 tab(s) orally every 8 hours  atorvastatin 40 mg oral tablet: 1 tab(s) orally once a day (at bedtime)  aztreonam 1 g injection: 1 gram(s) intravenously  calamine topical lotion: 1 Apply topically to affected area once a day  cyclobenzaprine 5 mg oral tablet: 1 tab(s) orally 3 times a day as needed for Muscle Spasm  diphenhydrAMINE 25 mg oral capsule: 1 cap(s) orally every 8 hours As needed Rash and/or Itching  DULoxetine 20 mg oral delayed release capsule: 1 cap(s) orally once a day  enoxaparin: 40 milligram(s) subcutaneous once a day  ergocalciferol 1.25 mg (50,000 intl units) oral capsule: 1 cap(s) orally once a week  erythromycin ethylsuccinate 100 mg/2.5 mL oral liquid: 128 milligram(s) orally every 12 hours  melatonin 3 mg oral tablet: 2 tab(s) orally once a day (at bedtime)  metoprolol tartrate 25 mg oral tablet: 0.5 tab(s) orally 2 times a day  midodrine 2.5 mg oral tablet: 3 tab(s) orally 3 times a day  MiraLax oral powder for reconstitution: 17 gram(s) orally 2 times a day  montelukast 10 mg oral tablet: 1 tab(s) orally once a day  morphine 10 mg/5 mL oral solution: 2.5 milliliter(s) orally every 4 hours as needed for Severe Pain (7 - 10)  Multiple Vitamins oral tablet: 1 tab(s) orally once a day  pramipexole 0.75 mg oral tablet: 5 tab(s) orally once a day (at bedtime)  pramipexole 1 mg oral tablet: 1 tab(s) orally once a day  pregabalin 100 mg oral capsule: 1 cap(s) orally 3 times a day  senna leaf extract oral tablet: 2 tab(s) orally once a day (at bedtime)  sodium chloride 0.65% nasal spray: nasal 2 times a day  tamsulosin 0.4 mg oral capsule: 1 cap(s) orally once a day (at bedtime)  vancomycin 1.25 g intravenous injection: 1.25 gram(s) intravenous every 12 hours  vonoprazan 10 mg oral tablet: 1 tab(s) orally once a day   acetaminophen 10 mg/mL intravenous solution: 100 milliliter(s) intravenous once  acetaminophen 325 mg oral tablet: 3 tab(s) orally every 8 hours  atorvastatin 40 mg oral tablet: 1 tab(s) orally once a day (at bedtime)  aztreonam 1 g injection: 1 gram(s) intravenously every 8 hours  calamine topical lotion: 1 Apply topically to affected area once a day  cyclobenzaprine 5 mg oral tablet: 1 tab(s) orally 3 times a day as needed for Muscle Spasm  diphenhydrAMINE 25 mg oral capsule: 1 cap(s) orally every 8 hours As needed Rash and/or Itching  DULoxetine 20 mg oral delayed release capsule: 1 cap(s) orally once a day  enoxaparin: 40 milligram(s) subcutaneous once a day  ergocalciferol 1.25 mg (50,000 intl units) oral capsule: 1 cap(s) orally once a week  erythromycin ethylsuccinate 100 mg/2.5 mL oral liquid: 128 milligram(s) orally every 12 hours  melatonin 3 mg oral tablet: 2 tab(s) orally once a day (at bedtime)  metoprolol tartrate 25 mg oral tablet: 0.5 tab(s) orally 2 times a day  midodrine 2.5 mg oral tablet: 3 tab(s) orally 3 times a day  MiraLax oral powder for reconstitution: 17 gram(s) orally 2 times a day  montelukast 10 mg oral tablet: 1 tab(s) orally once a day  morphine 10 mg/5 mL oral solution: 2.5 milliliter(s) orally every 4 hours as needed for Severe Pain (7 - 10)  Multiple Vitamins oral tablet: 1 tab(s) orally once a day  pramipexole 0.75 mg oral tablet: 5 tab(s) orally once a day (at bedtime)  pramipexole 1 mg oral tablet: 1 tab(s) orally once a day  pregabalin 100 mg oral capsule: 1 cap(s) orally 3 times a day  senna leaf extract oral tablet: 2 tab(s) orally once a day (at bedtime)  sodium chloride 0.65% nasal spray: nasal 2 times a day  tamsulosin 0.4 mg oral capsule: 1 cap(s) orally once a day (at bedtime)  vancomycin 1.25 g intravenous injection: 1.25 gram(s) intravenous every 12 hours  vonoprazan 10 mg oral tablet: 1 tab(s) orally once a day   acetaminophen 325 mg oral tablet: 3 tab(s) orally every 8 hours  atorvastatin 40 mg oral tablet: 1 tab(s) orally once a day (at bedtime)  calamine topical lotion: 1 Apply topically to affected area once a day  cefepime: 2,000 milligram(s) intravenous every 8 hours  cyclobenzaprine 5 mg oral tablet: 1 tab(s) orally 3 times a day as needed for Muscle Spasm  diphenhydrAMINE 25 mg oral capsule: 1 cap(s) orally every 8 hours As needed Rash and/or Itching  DULoxetine 20 mg oral delayed release capsule: 1 cap(s) orally once a day  enoxaparin: 40 milligram(s) subcutaneous once a day  ergocalciferol 1.25 mg (50,000 intl units) oral capsule: 1 cap(s) orally once a week  erythromycin ethylsuccinate 100 mg/2.5 mL oral liquid: 128 milligram(s) orally every 12 hours  melatonin 3 mg oral tablet: 2 tab(s) orally once a day (at bedtime)  metoprolol tartrate 25 mg oral tablet: 0.5 tab(s) orally 2 times a day  midodrine 2.5 mg oral tablet: 3 tab(s) orally 3 times a day  MiraLax oral powder for reconstitution: 17 gram(s) orally 2 times a day  montelukast 10 mg oral tablet: 1 tab(s) orally once a day  morphine 10 mg/5 mL oral solution: 2.5 milliliter(s) orally every 4 hours as needed for Severe Pain (7 - 10)  Multiple Vitamins oral tablet: 1 tab(s) orally once a day  petrolatum topical ointment: 1 Apply topically to affected area 4 times a day  pramipexole 0.75 mg oral tablet: 5 tab(s) orally once a day (at bedtime)  pramipexole 1 mg oral tablet: 1 tab(s) orally once a day  pregabalin 100 mg oral capsule: 1 cap(s) orally 3 times a day  senna leaf extract oral tablet: 2 tab(s) orally once a day (at bedtime)  sodium chloride 0.65% nasal spray: nasal 2 times a day  tamsulosin 0.4 mg oral capsule: 1 cap(s) orally once a day (at bedtime)

## 2024-10-21 NOTE — DISCHARGE NOTE PROVIDER - NSDCCPCAREPLAN_GEN_ALL_CORE_FT
PRINCIPAL DISCHARGE DIAGNOSIS  Diagnosis: Traumatic compression fracture of lumbar vertebra  Assessment and Plan of Treatment: You sustained a fracture of your vertebrae which was surgically repaired with a corpectomy and fusion. You were then transferred to [facility] where you initiated your paticipation in a comprehensive rehbilitation program prior to being transferred back to [facility]. See below for more details.      SECONDARY DISCHARGE DIAGNOSES  Diagnosis: MSSA bacteremia  Assessment and Plan of Treatment: You were found to have a fever and chills with change in your mental status. A rapid respone was called and you underwent an infectious workup and were found to have bacteria in your bloodstream and were started on Antibiotics to treat this. You also had imaging performed including XR, CT scan, and Urine studies/other labs. You were subsequently transferred to [facility] for completion of this infectious workup with a MICHAEL (a specific method of ultrasound imgaing of your heart) and further monitoring/treatment. Please contact the number on your discharge paperwork if you have any questions/concerns related to your care.

## 2024-10-21 NOTE — CONSULT NOTE ADULT - SUBJECTIVE AND OBJECTIVE BOX
HPI:   Patient is a 74y female with PMH significant for afib, morbid obesity, DM, peripheral neuropathy, GERD, OA, HTN, depression, scoliosis s/p many spinal surgeries, fell backwards while pushing her walker backwards, on 9/27/24. Taken to [facility] w c/o severe midback & left hip pain. Found to have acute nondisplaced fracture of T12 veterbral body extending into right pedicle w/o clear cord impingement. S/p T9- L3 posterior instrumentation & fusion on 9/29/24. Post-op course complicated by c/b CSK leak s/p repair, drainage from surgical incision, recurrent brief Afib, anemia, developed RLQ pain requiring straight cath for retention of > 400 ml of urine, then dumont placed which was removed on 10/13.     Admitted to [facility] on 10/16/24. Patient reported increased pain on the right side of midback on 10/20, then developed a high fever to 103.9F with shaking chills, tachycardia to 115/min, increased confusion. RRT was called for sepsis. Labs revealed mild lactic acidosis 2.6 --> 2.9 & elevated CRP of 183. Started on IV hydration with empiric vancomycin & Aztreonam. UA with pyuria of 25 WBCs, large LE, + nit & + bacteria. CXR ? small RUL infiltrate. Now with blood cx + for high grade MSSA bacteremia - patient is scheduled to be transferred back to [facility] - however, no bed available for her yet. ID called to manage IV antibiotics. Urine cx is showing E coli.      Patient reports that her allergy to PCN was about 50 yrs ago, She was challenged with a small dose of PCN a few yrs ago & had no reaction.     REVIEW OF SYSTEMS:  All other review of systems negative (Comprehensive ROS), except ongoing back pain & feels very cold.     PAST MEDICAL & SURGICAL HISTORY:  H/O seasonal allergies  History of pulmonary embolism - developed after Bradford abigail surgery, 1984 treated with coumadin 3 months, no issues after  Restless leg syndrome  GERD (gastroesophageal reflux disease)  OA (osteoarthritis)  Fungal infection of skin - under breast  H/O scoliosis  Essential hypertension  Depression  Osteoporosis  Right hip pain  2019 novel coronavirus disease (COVID-19)  Hiatal hernia  History of chronic pain  Peripheral neuropathy  S/P repair of paraesophageal hernia - 2001  S/P spinal fusion - L4-L5 1966  Scoliosis - s/p placement of Bradford abigail x 2 1984  S/P spinal surgery - x 2 1985, 1986  Removal of pin, plate, abigail, or screw - 1991- removal of Bradford abigail  S/P hysterectomy - 1982  S/P hip replacement - left (2008)  S/P shoulder surgery - right (2012)  S/P dilatation and curettage - 1981  H/O arthroscopy of knee - June 2021  S/P cataract surgery  History of bilateral hip replacements          Allergies  Dilantin (Urticaria)  penicillins (Urticaria)    Intolerances  erythromycin (Stomach Upset; Vomiting; Nausea)      Antimicrobials Day #  : 2  aztreonam  IVPB 1000 milliGRAM(s) IV Intermittent every 8 hours  aztreonam  IVPB      erythromycin    ethylsuccinate Suspension 40 mG/mL 128 milliGRAM(s) Oral every 12 hours  vancomycin  IVPB 1250 milliGRAM(s) IV Intermittent every 12 hours    Other Medications:  acetaminophen     Tablet .. 975 milliGRAM(s) Oral every 8 hours  atorvastatin 40 milliGRAM(s) Oral at bedtime  calamine/zinc oxide Lotion 1 Application(s) Topical daily  cyclobenzaprine 5 milliGRAM(s) Oral three times a day PRN  diphenhydrAMINE 25 milliGRAM(s) Oral every 8 hours PRN  DULoxetine 20 milliGRAM(s) Oral daily  enoxaparin Injectable 40 milliGRAM(s) SubCutaneous <User Schedule>  ergocalciferol 29823 Unit(s) Oral every week  lactated ringers Bolus 500 milliLiter(s) IV Bolus once  melatonin 6 milliGRAM(s) Oral at bedtime  metoprolol tartrate 12.5 milliGRAM(s) Oral every 12 hours  midodrine. 7.5 milliGRAM(s) Oral <User Schedule>  montelukast 10 milliGRAM(s) Oral daily  morphine   Solution 5 milliGRAM(s) Oral every 4 hours PRN  multivitamin 1 Tablet(s) Oral daily  polyethylene glycol 3350 17 Gram(s) Oral two times a day  pramipexole 3.75 milliGRAM(s) Oral at bedtime  pramipexole 1 milliGRAM(s) Oral <User Schedule>  pregabalin 100 milliGRAM(s) Oral three times a day  senna 2 Tablet(s) Oral at bedtime  sodium chloride 0.65% Nasal 1 Spray(s) Both Nostrils two times a day PRN  sodium chloride 0.9%. 1000 milliLiter(s) IV Continuous <Continuous>  tamsulosin 0.4 milliGRAM(s) Oral at bedtime  Vonoprazan (Voquezna) 10mg tablet 1 Tablet(s) 1 Tablet(s) Oral daily      FAMILY HISTORY:  Family history of abdominal aortic aneurysm (AAA) (Mother)        SOCIAL HISTORY:  Smoking: No   ETOH:  no    Drug Use: No      T(F): 101.3 (10-21-24 @ 08:11), Max: 103 (10-21-24 @ 00:05)  HR: 95 (10-21-24 @ 08:11)  BP: 112/46 (10-21-24 @ 08:11)  RR: 16 (10-21-24 @ 08:11)  SpO2: 94% (10-21-24 @ 08:11)  Wt(kg): --    PHYSICAL EXAM:  General: alert, no acute distress  Eyes:  anicteric, no conjunctival injection, no discharge  Oropharynx: no lesions or injection 	  Neck: supple, without adenopathy  Lungs: clear to auscultation  Heart: regular rate and rhythm; no murmurs  Abdomen: soft, nondistended, nontender.  Back: incision - C/D/I  Skin: no lesions  Extremities: no edema  Neurologic: alert, oriented, moves all extremities    LAB RESULTS:                        8.6    10.50 )-----------( 377      ( 21 Oct 2024 06:20 )             26.8     10-21    137  |  103  |  13  ----------------------------<  118[H]  3.0[L]   |  23  |  0.79    Ca    7.6[L]      21 Oct 2024 06:20    TPro  5.5[L]  /  Alb  2.4[L]  /  TBili  0.8  /  DBili  x   /  AST  23  /  ALT  33  /  AlkPhos  172[H]  10-21    LIVER FUNCTIONS - ( 21 Oct 2024 06:20 )  Alb: 2.4 g/dL / Pro: 5.5 g/dL / ALK PHOS: 172 U/L / ALT: 33 U/L / AST: 23 U/L / GGT: x           Urinalysis Basic - ( 21 Oct 2024 06:20 )    Color: x / Appearance: x / SG: x / pH: x  Gluc: 118 mg/dL / Ketone: x  / Bili: x / Urobili: x   Blood: x / Protein: x / Nitrite: x   Leuk Esterase: x / RBC: x / WBC x   Sq Epi: x / Non Sq Epi: x / Bacteria: x        MICROBIOLOGY:  RECENT CULTURES:  10-20 @ 17:15 Clean Catch Clean Catch (Midstream)     >100,000 CFU/ml Escherichia coli      10-20 @ 13:14 .Blood BLOOD     Growth in aerobic and anaerobic bottles: Gram Positive Cocci in Clusters    Growth in aerobic and anaerobic bottles: Gram Positive Cocci in Clusters    10-20 @ 13:07 .Blood BLOOD Blood Culture PCR    Growth in aerobic bottle: Gram Positive Cocci in Clusters  Growth in anaerobic bottle: Gram Positive Cocci in Clusters  Direct identification is available within approximately 3-5  hours either by Blood Panel Multiplexed PCR or Direct  MALDI-TOF. Details: [email or web address]    Growth in aerobic bottle: Gram Positive Cocci in Clusters  Growth in anaerobic bottle: Gram Positive Cocci in Clusters      RADIOLOGY REVIEWED:  < from: CT Lumbar Spine No Cont (10.21.24 @ 10:06) >  IMPRESSION:  Unremarkable CT scan of the thoracic spine.   No vertebral   fracture is recognized.    < end of copied text >  
Mrs. Haley French is a 74-year-old female patient with past medical history of OA, HTN, gastroparesis, peripheral neuropathy, multiple prior thoracolumbar spine surgeries for congenital scoliosis [place]/ [place] of hardware (1991) who presented to [facility] on 9/28/24 with back pain and left hip pain s/p mechanical fall. CT imaging was performed and reported a T12 three column spine fracture-malalignment with fracture extending to the adjacent right 12th posterior rib and left T12-L1 facet joint and acute, displaced fracture of the left eighth lateral rib. Chronic appearing bilateral rib deformities. Surgical management was recommended and she is S/P T9-L3 open fusion with PSO/lag screw partial corpectomy/interbody by Dr. Stroud, Neurosurgeon with complex Plastic Surgery closure by Dr. Elias on 9/29/24. Post op course c/b CSK leak s/p repair, new onset paroxsymal Afib with RVR,  increased pain and drainage from surgical incision, development of pleural effusions, symptomatic orthostatic hypotension, urinary retention, post op anemia, and chest pain with spontaneous resolution. Cardiac work up negative. Patient was evaluated by PM&R and therapy for functional deficits, gait/ADL impairments and acute rehabilitation was recommended. Patient was cleared for discharge to [facility] IRF on 10/16/24.    seen at the bedside, c/o back pain, 6/10, improves with meds, no n/v, no headache, dizziness, sob, cp, dysuria.  sitting up in chair        Review of Systems: per hpi     Allergies and Intolerances:        Allergies:  	Dilantin: Drug, Urticaria  	penicillins: Drug Category, Urticaria, angioedema       Intolerances:  	erythromycin: Drug, Stomach Upset, Vomiting, Nausea    Home Medications:   * Patient Currently Takes Medications as of 16-Oct-2024 11:38 documented in Structured Notes  · 	pramipexole 1 mg oral tablet: 1 tab(s) orally once a day at 6 am  · 	Multiple Vitamins oral tablet: 1 tab(s) orally once a day  · 	cyclobenzaprine 5 mg oral tablet: 1 tab(s) orally 3 times a day As needed Muscle Spasm  · 	montelukast 10 mg oral tablet: 1 tab(s) orally once a day  · 	polyethylene glycol 3350 oral powder for reconstitution: 17 gram(s) orally 2 times a day  · 	metoprolol tartrate 25 mg oral tablet: 1 tab(s) orally 2 times a day  · 	atorvastatin 40 mg oral tablet: 1 tab(s) orally once a day (at bedtime)  · 	DULoxetine 20 mg oral delayed release capsule: 1 cap(s) orally once a day  · 	pregabalin 100 mg oral capsule: 1 cap(s) orally 3 times a day  · 	enoxaparin: 40 milligram(s) subcutaneous once a day  · 	HYDROmorphone 1 mg/mL oral liquid: 1 milliliter(s) orally every 4 hours As needed Severe Pain (7 - 10)  · 	acetaminophen 500 mg oral tablet: 1 tab(s) orally every 6 hours as needed for  mild pain  · 	tamsulosin 0.4 mg oral capsule: 1 cap(s) orally once a day (at bedtime)  · 	Voquezna 10 mg oral tablet: 1 tab(s) orally once a day  · 	ergocalciferol 1.25 mg (50,000 intl units) oral capsule: 1 cap(s) orally once a week  · 	pramipexole: 3.75 milligram(s) orally once a day (at bedtime)  · 	erythromycin ethylsuccinate 100 mg/2.5 mL oral liquid: 128 milligram(s) orally every 12 hours    .    Patient History:    Past Medical, Past Surgical, and Family History:  PAST MEDICAL HISTORY:  2019 novel coronavirus disease (COVID-19) Dec 2020- hospitalized for 3 days, did not require home O2, denies residual symptoms    Depression     Essential hypertension     Fungal infection of skin under breast    GERD (gastroesophageal reflux disease)     H/O scoliosis     H/O seasonal allergies     Hiatal hernia     History of chronic pain     History of pulmonary embolism developed after bradford abigail surgery,1984 treated with coumadin 3 months, no issues after    OA (osteoarthritis)     Osteoporosis     Peripheral neuropathy     Restless leg syndrome     Right hip pain.     PAST SURGICAL HISTORY:  H/O arthroscopy of knee June 2021    History of bilateral hip replacements     Removal of pin, plate, abigail, or screw 1991- removal of bradford abigail    S/P cataract surgery     S/P dilatation and curettage 1981    S/P hip replacement left (2008)    S/P hysterectomy 1982    S/P repair of paraesophageal hernia 2001    S/P shoulder surgery right (2012)    S/P spinal fusion L4-L5 1966    S/P spinal surgery x 2 1985, 1986    Scoliosis s/p placement of Bradford abigial x 2 1984.     FAMILY HISTORY:  Mother  Still living? Unknown  Family history of abdominal aortic aneurysm (AAA), Age at diagnosis: Age Unknown.    SOCIAL HISTORY  Smoking - 20 pack years (10 years 2 ppd); quit 40 years ago  EtOH - denies  Drugs - denies    Physical Exam:     Vital Signs Last 24 Hrs  T(C): 36.9 (18 Oct 2024 08:42), Max: 37 (17 Oct 2024 20:06)  T(F): 98.5 (18 Oct 2024 08:42), Max: 98.6 (17 Oct 2024 20:06)  HR: 74 (18 Oct 2024 08:42) (74 - 99)  BP: 93/54 (18 Oct 2024 08:42) (93/54 - 134/71)  BP(mean): --  RR: 16 (18 Oct 2024 08:42) (15 - 16)  SpO2: 94% (18 Oct 2024 08:42) (94% - 97%)    Parameters below as of 18 Oct 2024 08:42  Patient On (Oxygen Delivery Method): room air    GENERAL- NAD  EAR/NOSE/MOUTH/THROAT -  MMM  EYES- TAM, conjunctiva and Sclera clear  NECK- supple  RESPIRATORY-  clear to auscultation bilaterally  CARDIOVASCULAR - SIS2, RRR  GI - soft NT BS present  EXTREMITIES- no pedal edema  NEUROLOGY- no gross focal deficits  PSYCHIATRY- AAO X 3       Labs and Results:        Basic Metabolic Panel in AM (10.16.24 @ 06:54)    Sodium: 138 mmol/L   Potassium: 4.4 mmol/L   Chloride: 99 mmol/L   Carbon Dioxide: 26 mmol/L   Anion Gap: 13 mmol/L   Blood Urea Nitrogen: 25 mg/dL   Creatinine: 0.80 mg/dL   Glucose: 109 mg/dL   Calcium: 9.5 mg/dL   eGFR: 76: The estimated glomerular filtration rate (eGFR) calculation is based on  the 2021 CKD-EPI creatinine equation, which is validated in male and  female population 18 years of age and older (N Engl J Med 2021;  [id number]:[id number]). mL/min/1.73m2      Complete Blood Count in AM (10.16.24 @ 06:53)    Nucleated RBC: 0 /100 WBCs   WBC Count: 6.11 K/uL   RBC Count: 3.41 M/uL   Hemoglobin: 9.5 g/dL   Hematocrit: 30.7 %   Mean Cell Volume: 90.0 fl   Mean Cell Hemoglobin: 27.9 pg   Mean Cell Hemoglobin Conc: 30.9 gm/dL   Red Cell Distrib Width: 15.5 %   Platelet Count - Automated: 547 K/uL      Labs reviewed:     CXR personally reviewed: < from: Xray Chest 1 View- PORTABLE-Routine (Xray Chest 1 View- PORTABLE-Routine in AM.) (10.15.24 @ 09:14) >    Frontal expiratory view of the chest shows the heart to be similar in   size. Spinal hardware and right shoulder hardware are again noted.    The lungs show partial clearing of the left base and there is no evidence   of pneumothorax nor right pleural effusion.    Chest one view 10/15/2024 8:27 AM  Compared to the prior study, there is further clearing of the left base.    IMPRESSION:  Left base clearing.    < end of copied text >      ECG reviewed and interpreted: < from: 12 Lead ECG (10.09.24 @ 11:34) >    Ventricular Rate 72 BPM    Atrial Rate 72 BPM    P-R Interval 158 ms    QRS Duration 82 ms    Q-T Interval 406 ms    QTC Calculation(Bazett) 444 ms    P Axis 41 degrees    R Axis -5 degrees    T Axis 18 degrees    Diagnosis Line NORMAL SINUS RHYTHM    < end of copied text >    < from: TTE W or WO Ultrasound Enhancing Agent (10.01.24 @ 10:43) >      1. Left ventricular cavity is normal in size. Left ventricular wall thickness is normal. Left ventricular systolic function is normal with an ejection fraction of 61 % by 3D. There are no regional wall motion abnormalities seen.    < end of copied text >      < from: CT Chest w/ IV Cont (10.10.24 @ 04:33) >  IMPRESSION:  Unchanged postoperative appearance of the spine with T12 fracture again   noted.    < end of copied text >    MEDICATIONS  (STANDING):  acetaminophen     Tablet .. 975 milliGRAM(s) Oral every 8 hours  atorvastatin 40 milliGRAM(s) Oral at bedtime  calamine/zinc oxide Lotion 1 Application(s) Topical daily  DULoxetine 20 milliGRAM(s) Oral daily  enoxaparin Injectable 40 milliGRAM(s) SubCutaneous <User Schedule>  ergocalciferol 28110 Unit(s) Oral every week  erythromycin    ethylsuccinate Suspension 40 mG/mL 128 milliGRAM(s) Oral every 12 hours  melatonin 6 milliGRAM(s) Oral at bedtime  metoprolol tartrate 25 milliGRAM(s) Oral two times a day  montelukast 10 milliGRAM(s) Oral daily  multivitamin 1 Tablet(s) Oral daily  polyethylene glycol 3350 17 Gram(s) Oral two times a day  pramipexole 3.75 milliGRAM(s) Oral at bedtime  pramipexole 1 milliGRAM(s) Oral <User Schedule>  pregabalin 100 milliGRAM(s) Oral three times a day  senna 2 Tablet(s) Oral at bedtime  tamsulosin 0.4 milliGRAM(s) Oral at bedtime  Vonoprazan (Voquezna) 10mg tablet 1 Tablet(s) 1 Tablet(s) Oral daily    MEDICATIONS  (PRN):  cyclobenzaprine 5 milliGRAM(s) Oral three times a day PRN Muscle Spasm  diphenhydrAMINE 25 milliGRAM(s) Oral every 8 hours PRN Rash and/or Itching  morphine   Solution 5 milliGRAM(s) Oral every 4 hours PRN Severe Pain (7 - 10)  sodium chloride 0.65% Nasal 1 Spray(s) Both Nostrils two times a day PRN Nasal Congestion

## 2024-10-21 NOTE — DISCHARGE NOTE PROVIDER - CARE PROVIDER_API CALL
Chris Armendariz  Physical/Rehab Medicine  [address] [place] [address]  Phone: [phone]  Fax: [phone]  Follow Up Time:

## 2024-10-21 NOTE — DISCHARGE NOTE PROVIDER - NSDCQMCOGNITION_NEU_ALL_CORE
Difficulty concentrating/Difficulty making decisions No difficulties/Difficulty concentrating/Difficulty making decisions

## 2024-10-21 NOTE — CONSULT NOTE ADULT - ASSESSMENT
Mrs. Haley French is a 74-year-old female patient with past medical history of OA, HTN, gastroparesis, peripheral neuropathy, multiple prior thoracolumbar spine surgeries for congenital scoliosis [place] >[place] w/ removal of hardware (1991) who presented to [facility] on 9/28/24 with back pain and left hip pain s/p mechanical fall. CT imaging was performed and reported a T12 three column spine fracture-malalignment with fracture extending to the adjacent right 12th posterior rib and left T12-L1 facet joint and acute, displaced fracture of the left eighth lateral rib. Chronic appearing bilateral rib deformities. Surgical management was recommended and she is S/P T9-L3 open fusion with PSO/lag screw partial corpectomy/interbody by Dr. Stroud, Neurosurgeon with complex Plastic Surgery closure by Dr. Elias on 9/29/24. Post op course c/b CSK leak s/p repair, new onset paroxsymal Afib with RVR,  increased pain and drainage from surgical incision, development of pleural effusions, symptomatic orthostatic hypotension, urinary retention, post op anemia, and chest pain with spontaneous resolution. Cardiac work up negative. Patient was evaluated by PM&R and therapy for functional deficits, gait/ADL impairments and acute rehabilitation was recommended. Patient was cleared for discharge to [facility] IRF on 10/16/24.    #Traumatic T11-12 vertebral fracture s/p corpectomy and fusion surgery   - S/P T9-L3 open fusion with PSO/lag screw partial corpectomy/interbody with complex plastic closure ( w/ Dr. Stroud and Dr. Tinajero on 9/29)   - CT Lumbar Spine (9/28/24): T12 three column spine fracture-malalignment with fracture extending to the adjacent right 12th posterior rib and left T12-L1 facet joint. and acute displaced fracture of the left eighth lateral rib.  - CT Lumbar Spine (10/5/24): Status post spinal fusion of T9-L3 with bilateral rods and screws and cylindrical cage within the fractured T12 vertebral body. Resection of the LEFT T12 pedicle and a LEFT hemilaminectomy.      Hardware appears intact. No evidence of hardware loosening. Expected postoperative changes.   - TLSO when OOB  - Pain management: Tylenol 975mg ATC, Duloxetine 20mg daily, Lyrica 100mg TID , Flexeril 5mg TID PRN  - pt/ot/dvt ppx/ pain meds      #Orthostatic hypotension  - Monitor daily  - S/P 1L IVF bolus (10/13) for symptomatic hypotension  - Losartan 100mg daily HOLD  - Chlorthalidone 25mg daily HOLD (may restart if SBP is consistently above 140 per cardio)  - decrease metoprolol to 12.5  - add midodrine since SBP 99  - S/p Midodrine     #Seasonal allergies/Nasal congestion  - Singulair     #HLD  - Atorvastatin     #Paroxysmal A-Fib  - TTE 61%  - S/p IV Lopressor x 1   - decreased Metoprolol to 12.5mg bid 10/18/24 due to low BP  - Monitor, if continue to be in A-fib, will need full dose AC per cardio    #Hyponatremia (resolved)  - Likely 2/2 SIADH in setting of pain and poor solute intake  - Liberalize fluid restriction in setting of soft BP  - Salt tabs 1g TID today  - Monitor Na (10/16 Na 138)    #Post op Anemia.   - S/p PRBCs intraoperatively   - Hb stable with no evidence of active bleed at this time.   - Monitor H/H (10/16 Hgb 9.5)    #SILVANA (obstructive sleep apnea).   - transient desaturations on RA    - c/w Nocturnal 2L O2 and PRN during day during naps, monitor sats   - F/u with PCP outpatient as may benefit from sleep study and CPAP.    #Pleural effusion   - 10/10 CT Abd/pelvis- neg for GI bleed  - S/p one dose of Lasix with significant output  - repeat CXR 10/15 with resolution of pleural effusions    #Liver cyst  -10/10 CT Abd/pelvis- There is a large cyst, stable since prior exam. There are small hypodense foci on segment II and segment I too small to characterize, unchanged since prior.  -Outpatient f/u     #Restless Leg Syndrome  -Pramipexole     #Elevated alkaline phosphatase level   - monitor     #Depression  -Duloxetine     # Gastritis-  Pantoprazole  Voquenza  , Eryped     #Urinary retention  - PVR q 8 hours (SC if > 400)  -Flomax     #DVT ppx: Lovenox     will follow  d/w rehab team   time spent in e/m visit 80 min 
74y female with PMH significant for afib, morbid obesity, DM, peripheral neuropathy, GERD, OA, HTN, depression, scoliosis s/p many spinal surgeries, fell backwards while pushing her walker backwards, on 9/27/24. Taken to [facility] w c/o severe midback & left hip pain. Found to have acute nondisplaced fracture of T12 veterbral body extending into right pedicle w/o clear cord impingement. S/p T9- L3 posterior instrumentation & fusion on 9/29/24. Post-op course complicated by c/b CSK leak s/p repair, drainage from surgical incision, recurrent brief Afib, anemia, developed RLQ pain requiring straight cath for retention of > 400 ml of urine, then dumont placed which was removed on 10/13.     Admitted to [facility] on 10/16/24.  Patient reported increased pain on the right side of midback on 10/20, then developed a high fever to 103.9F with shaking chills, tachycardia to 115/min, increased confusion. RRT was called for sepsis.   Labs revealed mild lactic acidosis 2.6 --> 2.9 & elevated CRP of 183.   Started on IV hydration with empiric vancomycin & Aztreonam.   UA with pyuria of 25 WBCs, large LE, + nit & + bacteria. urine cx with E coli - sensitivity data pending  CXR ? small RUL infiltrate.   Blood cx + for high grade MSSA bacteremia - patient is scheduled to be transferred back to [facility] - however, no bed available for her yet. ID called to manage IV antibiotics.      Her allergy to PCN was about 50 yrs ago, she was challenged with a small dose of PCN a few yrs ago & had no reaction - therefore certainly a candidate for cephalosporins.  High grade MSSA bacteremia & ongoing back pain raises concern about post op infection.    UA with pyuria in a patient who had retention requiring dumont, places her at risk for UTI as well.   Non contrast CT TL spine is non revealing.     PLAN:  Continue IV vancomycin, should be able to change to cephalosporins  Repeat blood cx  Continue IV Aztreonam for suspected UTI - follow susceptibility data of E coli.   Thank you.

## 2024-10-21 NOTE — PROGRESS NOTE ADULT - ASSESSMENT
Mrs. Haley French is a 74-year-old female patient with past medical history of OA, HTN, gastroparesis, peripheral neuropathy, multiple prior thoracolumbar spine surgeries for congenital scoliosis [place] w/ removal of hardware (1991) who presented to [facility] on 9/28/24 with back pain and left hip pain s/p mechanical fall. CT imaging was performed and reported a T12 three column spine fracture-malalignment with fracture extending to the adjacent right 12th posterior rib and left T12-L1 facet joint and acute, displaced fracture of the left eighth lateral rib. Chronic appearing bilateral rib deformities. Surgical management was recommended and she is S/P T9-L3 open fusion with PSO/lag screw partial corpectomy/interbody by Dr. Stroud, Neurosurgeon with complex Plastic Surgery closure by Dr. Elias on 9/29/24. Post op course c/b CSK leak s/p repair, new onset paroxsymal Afib with RVR,  increased pain and drainage from surgical incision, development of pleural effusions, symptomatic orthostatic hypotension, urinary retention, post op anemia, and chest pain with spontaneous resolution. Cardiac work up negative. Patient was evaluated by PM&R and therapy for functional deficits, gait/ADL impairments and acute rehabilitation was recommended. Patient was cleared for discharge to [facility] IRF on 10/16/24.    #Traumatic T11-12 vertebral fracture s/p corpectomy and fusion surgery   - S/P T9-L3 open fusion with PSO/lag screw partial corpectomy/interbody with complex plastic closure (w/ Dr. Stroud and Dr. Tinajero on 9/29)   - CT Lumbar Spine (9/28/24): T12 three column spine fracture-malalignment with fracture extending to the adjacent right 12th posterior rib and left T12-L1 facet joint. and acute displaced fracture of the left eighth lateral rib.  - CT Lumbar Spine (10/5/24): Status post spinal fusion of T9-L3 with bilateral rods and screws and cylindrical cage within the fractured T12 vertebral body. Resection of the LEFT T12 pedicle and a LEFT hemilaminectomy. Hardware appears intact. No evidence of hardware loosening. Expected postoperative changes.   - TLSO when OOB  - Pain management: Tylenol 975mg ATC, Duloxetine 20mg daily, Lyrica 100mg TID , Flexeril 5mg TID PRN  - pt/ot/dvt ppx/ pain meds    #LLE tenderness on palpation   - Doppler ultrasound (10/15/24): No evidence of deep venous thrombosis in either lower extremity.    # PAF  #HTN  #Orthostatic hypotension  - TTE 61%  - S/P 1L IVF bolus (10/13) for symptomatic hypotension  - HOLDING Losartan 100 mg daily and Chlorthalidone 25 mg daily (restart if SBP is consistently above 140 per cardio)  - Continue Metoprolol to 12.5 BID  - Continue Midodrine 7.5 mg AM (wean off as tolerated)   - Encouraged PO intake   - Monitor, if continue to be in A-fib, will need full dose AC per cardio    #Seasonal allergies/Nasal congestion  - Singulair     #HLD  - Atorvastatin     #Hyponatremia (resolved)  - Likely 2/2 SIADH in setting of pain and poor solute intake  - Liberalize fluid restriction in setting of soft BP  - Monitor Na     #Post op Anemia   - S/p PRBCs intraoperatively   - Hb stable with no evidence of active bleed at this time.   - Monitors    #SILVANA (obstructive sleep apnea).   - Transient desaturations on RA    - c/w Nocturnal 2L O2 and PRN during day during naps, monitor sats   - F/u with PCP outpatient as may benefit from sleep study and CPAP.    #Pleural effusion  - 10/10 CT Abd/pelvis- neg for GI bleed  - S/p one dose of Lasix with significant output  - repeat CXR 10/15 with resolution of pleural effusions    #Liver cyst  -10/10 CT Abd/pelvis- There is a large cyst, stable since prior exam. There are small hypodense foci on segment II and segment I too small to characterize, unchanged since prior.  -Outpatient f/u     #Restless Leg Syndrome  -Pramipexole     #Elevated alkaline phosphatase level   - monitor     #Depression  -Duloxetine     # Gastritis-  Pantoprazole  Voquenza  , Eryped     #Urinary retention  - PVR q 8 hours (SC if > 400)  - Flomax     #DVT ppx: Lovenox     will follow    d/w rehab team at [facility]   74-year-old female with past medical history of OA, HTN, gastroparesis, peripheral neuropathy, multiple prior thoracolumbar spine surgeries for congenital scoliosis done >[place] ago [place] of [place] w/ removal of hardware (1991) who presented to [facility] on 9/28/24 with back pain and left hip pain s/p mechanical fall. CT imaging was performed and reported a T12 three column spine fracture-malalignment with fracture extending to the adjacent right 12th posterior rib and left T12-L1 facet joint and acute, displaced fracture of the left eighth lateral rib.  S/P T9-L3 open fusion with PSO/lag screw partial corpectomy/interbody by Dr. Stroud, Plastic Surgery closure by Dr. Elisa on 9/29/24. Post op course c/b CSK leak s/p repair, new onset paroxsymal Afib with RVR,  increased pain and drainage from surgical incision, development of pleural effusions, symptomatic orthostatic hypotension, urinary retention, post op anemia, and chest pain with spontaneous resolution.     Admitted to  [facility], c/b fever Tmax 103.9 on 10/20 and found with GPC bacteremia. Pending inpatient transfer to medicine at [facility].    #sepsis due to GPC bacteremia  - CT T/L spine without contrast ordered, was not revealing for infectious source. Recommend repeat with and without contrast for better visualization. Continuing empiric antibiotics at this time. Verified vanco dosing with pharmacy today. obtain trough prior to 4th dose.  - ID consulted.   - CXR with old rib fracture and small RUL infiltrate  - +UA, continue IV aztreonam and IV vancomycin. follow urine culture.  - lactic acidosis resolved.    #Traumatic T11-12 vertebral fracture s/p corpectomy and fusion surgery   - S/P T9-L3 open fusion with PSO/lag screw partial corpectomy/interbody with complex plastic closure (w/ Dr. Stroud and Dr. Tinajero on 9/29)   - CT Lumbar Spine (9/28/24): T12 three column spine fracture-malalignment with fracture extending to the adjacent right 12th posterior rib and left T12-L1 facet joint. and acute displaced fracture of the left eighth lateral rib.  - CT Lumbar Spine (10/5/24): Status post spinal fusion of T9-L3 with bilateral rods and screws and cylindrical cage within the fractured T12 vertebral body. Resection of the LEFT T12 pedicle and a LEFT hemilaminectomy. Hardware appears intact. No evidence of hardware loosening. Expected postoperative changes.   - TLSO when OOB  - Pain management: Tylenol 975mg ATC, Duloxetine 20mg daily, Lyrica 100mg TID , Flexeril 5mg TID PRN  - pt/ot/dvt ppx/ pain meds    #LLE tenderness on palpation   - Doppler ultrasound (10/15/24): No evidence of deep venous thrombosis in either lower extremity.    # PAF, resolved  #HTN  #Orthostatic hypotension, resolved  - TTE 61%  - S/P 1L IVF bolus (10/13) for symptomatic hypotension  - patient converted back to SR. if AF is persistent will need AC  - HOLDING Losartan 100 mg daily and Chlorthalidone 25 mg daily (restart if SBP is consistently above 140 per cardio)  - Continue Metoprolol to 12.5 BID  - Continue Midodrine 7.5 mg AM (wean off as tolerated)     #Seasonal allergies/Nasal congestion  - Singulair     #HLD  - Atorvastatin     #Hyponatremia (resolved)  - Likely 2/2 SIADH in setting of pain and poor solute intake    #Post op Anemia , stable  - S/p PRBCs intraoperatively   - Hb stable with no evidence of active bleed at this time.   - Monitor H/H    #SILVANA (obstructive sleep apnea).   - Transient desaturations on RA    - c/w Nocturnal 2L O2 and PRN during day during naps, monitor sats   - F/u with PCP outpatient as may benefit from sleep study and CPAP.    #Liver cyst  -10/10 CT Abd/pelvis- There is a large cyst, stable since prior exam. There are small hypodense foci on segment II and segment I too small to characterize, unchanged since prior.  -Outpatient f/u     #Restless Leg Syndrome  -Pramipexole     #Elevated alkaline phosphatase level   - monitor     #Depression  -Duloxetine     # Gastritis-  Pantoprazole    #Urinary retention, resolved  - PVR q 8 hours (SC if > 400)  - Flomax     #DVT ppx: Lovenox     will follow    d/w rehab team, pending [facility] transfer.  discussed above with patient.

## 2024-10-21 NOTE — PROGRESS NOTE ADULT - SUBJECTIVE AND OBJECTIVE BOX
Tmax 103.9F last night  denies worsening back pain more than usual  having chills.      PHYSICAL EXAM:    Vital Signs Last 24 Hrs  T(F): 101.3 (21 Oct 2024 08:11), Max: 103 (21 Oct 2024 00:05)  HR: 95 (21 Oct 2024 08:11) (87 - 115)  BP: 112/46 (21 Oct 2024 08:11) (103/57 - 127/67)  RR: 16 (21 Oct 2024 08:11) (15 - 17)  SpO2: 94% (21 Oct 2024 08:11) (94% - 96%)  I&O's Summary      GENERAL: NAD  HEENT: NCAT  CHEST/LUNG: No increased WOB, Clear to percussion bilaterally; No rales, rhonchi, wheezing  HEART: Regular rate and rhythm; No murmurs  ABDOMEN: Soft, Nontender, Nondistended; Bowel sounds present  MUSCULOSKELETAL/EXTREMITIES:  2+ Peripheral Pulses, No LE edema  PSYCH: Appropriate affect, Alert & Oriented    LABS:                        8.6    10.50 )-----------( 377      ( 21 Oct 2024 06:20 )             26.8       10-21    137  |  103  |  13  ----------------------------<  118  3.0   |  23  |  0.79    Ca    7.6      21 Oct 2024 06:20    TPro  5.5  /  Alb  2.4  /  TBili  0.8  /  DBili  x   /  AST  23  /  ALT  33  /  AlkPhos  172  10-21       PT/INR - ( 20 Oct 2024 13:14 )   PT: 12.0 sec;   INR: 1.02 ratio         PTT - ( 20 Oct 2024 13:14 )  PTT:29.2 sec   Lactate, Blood: 1.4 mmol/L (10-21 @ 06:20)  Lactate, Blood: 1.7 mmol/L (10-20 @ 22:00)  Lactate, Blood: 2.9 mmol/L (10-20 @ 17:08)  Lactate, Blood: 2.6 mmol/L (10-20 @ 13:14)                          Urinalysis Basic - ( 21 Oct 2024 06:20 )    Color: x / Appearance: x / SG: x / pH: x  Gluc: 118 mg/dL / Ketone: x  / Bili: x / Urobili: x   Blood: x / Protein: x / Nitrite: x   Leuk Esterase: x / RBC: x / WBC x   Sq Epi: x / Non Sq Epi: x / Bacteria: x        Culture - Blood (collected 20 Oct 2024 13:14)  Source: .Blood BLOOD  Gram Stain (21 Oct 2024 11:04):    Growth in aerobic and anaerobic bottles: Gram Positive Cocci in Clusters  Preliminary Report (21 Oct 2024 11:04):    Growth in aerobic and anaerobic bottles: Gram Positive Cocci in Clusters    Culture - Blood (collected 20 Oct 2024 13:07)  Source: .Blood BLOOD  Gram Stain (21 Oct 2024 07:58):    Growth in aerobic bottle: Gram Positive Cocci in Clusters  Preliminary Report (21 Oct 2024 07:58):    Growth in aerobic bottle: Gram Positive Cocci in Clusters    Direct identification is available within approximately 3-5    hours either by Blood Panel Multiplexed PCR or Direct    MALDI-TOF. Details: [email or web address]  Organism: Blood Culture PCR (21 Oct 2024 09:33)  Organism: Blood Culture PCR (21 Oct 2024 09:33)      Method Type: PCR      -  Methicillin SENSITIVE Staphylococcus aureus (MSSA): Detec Any isolate of Staphylococcus aureus from a blood culture is NOT considered a contaminant.      COVID-19 PCR: NotDetec (10-21-24 @ 09:20)  COVID-19 PCR: NotDetec (10-17-24 @ 16:34)      MEDICATIONS  (STANDING):  acetaminophen     Tablet .. 975 milliGRAM(s) Oral every 8 hours  atorvastatin 40 milliGRAM(s) Oral at bedtime  aztreonam  IVPB 1000 milliGRAM(s) IV Intermittent every 8 hours  aztreonam  IVPB      calamine/zinc oxide Lotion 1 Application(s) Topical daily  DULoxetine 20 milliGRAM(s) Oral daily  enoxaparin Injectable 40 milliGRAM(s) SubCutaneous <User Schedule>  ergocalciferol 96090 Unit(s) Oral every week  erythromycin    ethylsuccinate Suspension 40 mG/mL 128 milliGRAM(s) Oral every 12 hours  lactated ringers Bolus 500 milliLiter(s) IV Bolus once  melatonin 6 milliGRAM(s) Oral at bedtime  metoprolol tartrate 12.5 milliGRAM(s) Oral every 12 hours  midodrine. 7.5 milliGRAM(s) Oral <User Schedule>  montelukast 10 milliGRAM(s) Oral daily  multivitamin 1 Tablet(s) Oral daily  polyethylene glycol 3350 17 Gram(s) Oral two times a day  pramipexole 3.75 milliGRAM(s) Oral at bedtime  pramipexole 1 milliGRAM(s) Oral <User Schedule>  pregabalin 100 milliGRAM(s) Oral three times a day  senna 2 Tablet(s) Oral at bedtime  sodium chloride 0.9%. 1000 milliLiter(s) (75 mL/Hr) IV Continuous <Continuous>  tamsulosin 0.4 milliGRAM(s) Oral at bedtime  vancomycin  IVPB 1250 milliGRAM(s) IV Intermittent every 12 hours  Vonoprazan (Voquezna) 10mg tablet 1 Tablet(s) 1 Tablet(s) Oral daily    MEDICATIONS  (PRN):  cyclobenzaprine 5 milliGRAM(s) Oral three times a day PRN Muscle Spasm  diphenhydrAMINE 25 milliGRAM(s) Oral every 8 hours PRN Rash and/or Itching  morphine   Solution 5 milliGRAM(s) Oral every 4 hours PRN Severe Pain (7 - 10)  sodium chloride 0.65% Nasal 1 Spray(s) Both Nostrils two times a day PRN Nasal Congestion      Care Discussed with Consultants/Other Providers: Yes   Melolabial Interpolation Flap Text: A decision was made to reconstruct the defect utilizing an interpolation axial flap and a staged reconstruction.  A telfa template was made of the defect.  This telfa template was then used to outline the melolabial interpolation flap.  The donor area for the pedicle flap was then injected with anesthesia.  The flap was excised through the skin and subcutaneous tissue down to the layer of the underlying musculature.  The pedicle flap was carefully excised within this deep plane to maintain its blood supply.  The edges of the donor site were undermined.   The donor site was closed in a primary fashion.  The pedicle was then rotated into position and sutured.  Once the tube was sutured into place, adequate blood supply was confirmed with blanching and refill.  The pedicle was then wrapped with xeroform gauze and dressed appropriately with a telfa and gauze bandage to ensure continued blood supply and protect the attached pedicle.

## 2024-10-21 NOTE — DISCHARGE NOTE PROVIDER - HOSPITAL COURSE
HPI:  Mrs. Haley French is a 74-year-old female patient with past medical history of OA, HTN, gastroparesis, peripheral neuropathy, multiple prior thoracolumbar spine surgeries for congenital scoliosis [place] >[place] w/ removal of hardware (1991) who presented to [facility] on 9/28/24 with back pain and left hip pain s/p mechanical fall. CT imaging was performed and reported a T12 three column spine fracture-malalignment with fracture extending to the adjacent right 12th posterior rib and left T12-L1 facet joint and acute, displaced fracture of the left eighth lateral rib. Chronic appearing bilateral rib deformities. Surgical management was recommended and she is S/P T9-L3 open fusion with PSO/lag screw partial corpectomy/interbody by Dr. Stroud, Neurosurgeon with complex Plastic Surgery closure by Dr. Elias on 9/29/24. Post op course c/b CSK leak s/p repair, new onset paroxsymal Afib with RVR,  increased pain and drainage from surgical incision, development of pleural effusions, symptomatic orthostatic hypotension, urinary retention, post op anemia, and chest pain with spontaneous resolution. Cardiac work up negative. Patient was evaluated by PM&R and therapy for functional deficits, gait/ADL impairments and acute rehabilitation was recommended. Patient was cleared for discharge to [facility] IRF on 10/16/24. (17 Oct 2024 10:26)  Rehab course significant for change in mental status, rrt called.   Patient was febrile to 103, initiated sepsis w/up and found to have small right upper lobe infiltrate, UTI, and MSSA bacteremia. CTH negative. Pending CT spine. Patient remains febrile despite tylenol and nonpharmacologic cooling measures. Patient otherwise asymptomatic. Hospitalist at [place] made aware and per G+ bacteremia guidelines  , recommending completing infectious workup, which includes MICHAEL that is not available at [place]. Transfer request initiated from [place] to [facility] for MICHAEL and completion of bacteremia workup. All other medical co-morbidities were stable. Discharge instructions were discussed with patient and family. All questions answered and all concerns addressed. Patient was deemed medically stable for discharge to [facility] medicine on 10/21.     Functional Status:  PT - Walk (Mobility)  Current Status: 10/19  RW 30',50' with Min A and WC follow    PT - Bed Mobility (Mobility)  Current Status: 10/19  Sit to/from supine =min A    PT - Bed/Chair/Wheelchair (Mobility)  Current Status: 10/19  Sit to/from stand= min A using RW    OT - Tub/Shower Transfers (Mobility)  Current Status: 10/18/24:  IE   Not assessed due to safety concerns.    OT - Dressing (Lower) (Self Care)  Current Status: 10/18/24:  IE  total assist.    OT - Dressing (Upper) (Self Care)  Current Status: 10/18/24: moderate assist including TLSO.     HPI:  Mrs. Haley French is a 74-year-old female patient with past medical history of OA, HTN, gastroparesis, peripheral neuropathy, multiple prior thoracolumbar spine surgeries for congenital scoliosis done >[place] ago [place] of [place] w/ removal of hardware (1991) who presented to [facility] on 9/28/24 with back pain and left hip pain s/p mechanical fall. CT imaging was performed and reported a T12 three column spine fracture-malalignment with fracture extending to the adjacent right 12th posterior rib and left T12-L1 facet joint and acute, displaced fracture of the left eighth lateral rib. Chronic appearing bilateral rib deformities. Surgical management was recommended and she is S/P T9-L3 open fusion with PSO/lag screw partial corpectomy/interbody by Dr. Stroud, Neurosurgeon with complex Plastic Surgery closure by Dr. Elias on 9/29/24. Post op course c/b CSK leak s/p repair, new onset paroxsymal Afib with RVR,  increased pain and drainage from surgical incision, development of pleural effusions, symptomatic orthostatic hypotension, urinary retention, post op anemia, and chest pain with spontaneous resolution. Cardiac work up negative. Patient was evaluated by PM&R and therapy for functional deficits, gait/ADL impairments and acute rehabilitation was recommended. Patient was cleared for discharge to [facility] IRF on 10/16/24. (17 Oct 2024 10:26)  Rehab course significant for change in mental status, rrt called.   Patient was febrile to 103, tachycardic, and subjective fever/chills. Sepsis w/up initiated and found to have small right upper lobe infiltrate, UTI, and MSSA bacteremia. CTH negative. Pending CT spine. Patient remains febrile despite tylenol and nonpharmacologic cooling measures. Patient otherwise asymptomatic. Hospitalist and ID consulted at [facility] made aware and per G+ bacteremia guidelines, recommending completing infectious workup, which includes MICHAEL that is not available at [facility] location. Patient also requires PICC line placement for long term ABx. Transfer request initiated from [facility] to [facility] for MICHAEL and completion of bacteremia workup/treatment. All other medical co-morbidities were stable. Discharge instructions were discussed with patient and family. All questions answered and all concerns addressed. Patient was deemed medically stable for discharge to [facility] medicine on 10/21.     Functional Status:  PT - Walk (Mobility)  Current Status: 10/19  RW 30',50' with Min A and WC follow    PT - Bed Mobility (Mobility)  Current Status: 10/19  Sit to/from supine =min A    PT - Bed/Chair/Wheelchair (Mobility)  Current Status: 10/19  Sit to/from stand= min A using RW    OT - Tub/Shower Transfers (Mobility)  Current Status: 10/18/24:  IE   Not assessed due to safety concerns.    OT - Dressing (Lower) (Self Care)  Current Status: 10/18/24:  IE  total assist.    OT - Dressing (Upper) (Self Care)  Current Status: 10/18/24: moderate assist including TLSO.     HPI:  Mrs. Haley French is a 74-year-old female patient with past medical history of OA, HTN, gastroparesis, peripheral neuropathy, multiple prior thoracolumbar spine surgeries for congenital scoliosis done >10 years ago out of [place] w/ removal of hardware (1991) who presented to [facility] on 9/28/24 with back pain and left hip pain s/p mechanical fall. CT imaging was performed and reported a T12 three column spine fracture-malalignment with fracture extending to the adjacent right 12th posterior rib and left T12-L1 facet joint and acute, displaced fracture of the left eighth lateral rib. Chronic appearing bilateral rib deformities. Surgical management was recommended and she is S/P T9-L3 open fusion with PSO/lag screw partial corpectomy/interbody by Dr. Stroud, Neurosurgeon with complex Plastic Surgery closure by Dr. Elias on 9/29/24. Post op course c/b CSK leak s/p repair, new onset paroxsymal Afib with RVR,  increased pain and drainage from surgical incision, development of pleural effusions, symptomatic orthostatic hypotension, urinary retention, post op anemia, and chest pain with spontaneous resolution. Cardiac work up negative. Patient was evaluated by PM&R and therapy for functional deficits, gait/ADL impairments and acute rehabilitation was recommended. Patient was cleared for discharge to [facility] IRF on 10/16/24. (17 Oct 2024 10:26)  Rehab course significant for change in mental status, rrt called.     Patient was febrile to 103, tachycardic, and subjective fever/chills. Sepsis w/up initiated and found to have small right upper lobe infiltrate, UTI, and MSSA bacteremia. CTH negative. Pending CT spine. Patient remains febrile despite tylenol and nonpharmacologic cooling measures. Patient otherwise asymptomatic. Hospitalist and ID consulted at [place] made aware and per G+ bacteremia guidelines, recommending completing infectious workup, which includes MICHAEL that is not available at [place] location. Patient also requires PICC line placement for long term ABx. Transfer request initiated from [place] to [facility] for MICHAEL and completion of bacteremia workup/treatment. All other medical co-morbidities were stable. Discharge instructions were discussed with patient and family. All questions answered and all concerns addressed. Patient was deemed medically stable for discharge to [facility] medicine on 10/21.     Functional Status:  PT - Walk (Mobility)  Current Status: 10/19  RW 30',50' with Min A and WC follow    PT - Bed Mobility (Mobility)  Current Status: 10/19  Sit to/from supine =min A    PT - Bed/Chair/Wheelchair (Mobility)  Current Status: 10/19  Sit to/from stand= min A using RW    OT - Tub/Shower Transfers (Mobility)  Current Status: 10/18/24:  IE   Not assessed due to safety concerns.    OT - Dressing (Lower) (Self Care)  Current Status: 10/18/24:  IE  total assist.    OT - Dressing (Upper) (Self Care)  Current Status: 10/18/24: moderate assist including TLSO.

## 2024-10-21 NOTE — DISCHARGE NOTE PROVIDER - NSDCFUSCHEDAPPT_GEN_ALL_CORE_FT
Abdoulaye Mercedes  [facility]  ORTHOSURG [address] [facility] [address]  Scheduled Appointment: 10/22/2024    Angie Koch  [facility]  DERM [address]  Scheduled Appointment: 10/22/2024    Guille Lance  [facility]  ORTHOSURG [address]  Scheduled Appointment: 11/22/2024    Catalino Cotto  [facility]  WEIGHTMGMT  [facility]  Scheduled Appointment: 12/05/2024    Haydee Bernstein  [facility]  GASTRO [address]  Scheduled Appointment: 01/06/2025     Guille Lance  [facility]  ORTHOSURG [address] [facility] [facility]  Scheduled Appointment: 11/22/2024    Catalino Cotto  [facility]  WEIGHTMGMT  [facility]  Scheduled Appointment: 12/05/2024    Haydee Bernstein  [facility]  GASTRO [address]  Scheduled Appointment: 01/06/2025

## 2024-10-21 NOTE — PROGRESS NOTE ADULT - SUBJECTIVE AND OBJECTIVE BOX
HPI:  Mrs. Haley French is a 74-year-old female patient with past medical history of OA, HTN, gastroparesis, peripheral neuropathy, multiple prior thoracolumbar spine surgeries for congenital scoliosis [place] >[place] w/ removal of hardware (1991) who presented to [facility] on 9/28/24 with back pain and left hip pain s/p mechanical fall. CT imaging was performed and reported a T12 three column spine fracture-malalignment with fracture extending to the adjacent right 12th posterior rib and left T12-L1 facet joint and acute, displaced fracture of the left eighth lateral rib. Chronic appearing bilateral rib deformities. Surgical management was recommended and she is S/P T9-L3 open fusion with PSO/lag screw partial corpectomy/interbody by Dr. Stroud, Neurosurgeon with complex Plastic Surgery closure by Dr. Elias on 9/29/24. Post op course c/b CSK leak s/p repair, new onset paroxsymal Afib with RVR,  increased pain and drainage from surgical incision, development of pleural effusions, symptomatic orthostatic hypotension, urinary retention, post op anemia, and chest pain with spontaneous resolution. Cardiac work up negative. Patient was evaluated by PM&R and therapy for functional deficits, gait/ADL impairments and acute rehabilitation was recommended. Patient was cleared for discharge to [facility] IRF on 10/16/24. (17 Oct 2024 10:26)    ___________________________________________________________________________    SUBJECTIVE/ROS  Patient was seen and evaluated at bedside today.  Weekend eventful fr RRT due to fever and confusion yesterday (10/20).  Imaging as below with positive blood cultures and a small right upper lobe infiltrate.   Will follow-up with ID and discuss with Hospitalist.  Case to be evaluated at Interdisciplinary Team meeting tomorrow.  Eager to participate on the recommended rehabilitation program.  Denies any CP, SOB, POLLARD, palpitations, fever, chills, body aches, cough, congestion, or any other symptoms at this time.   ___________________________________________________________________________    IMAGING (per official Radiology reports)    US DPLX LWR EXT VEINS COMPL BI  PROCEDURE DATE:  10/20/2024    INTERPRETATION:  CLINICAL INFORMATION: Leg pain.    IMPRESSION: No evidence of deep venous thrombosis in either lower extremity.    XR CHEST AP OR PA 1V  PROCEDURE DATE:  10/20/2024    IMPRESSION: Small right upper lobe infiltrate at this time.    CT BRAIN  PROCEDURE DATE:  10/20/2024    IMPRESSION: No evidence of acute intracranial pathology. If clinical symptoms persist or worsen, more sensitive evaluation with brain MRI may be obtained, if no contraindications exist.    ___________________________________________________________________________    Vital Signs Last 24 Hrs  T(C): 38.9 (21 Oct 2024 05:39), Max: 39.9 (20 Oct 2024 12:46)  T(F): 102 (21 Oct 2024 05:39), Max: 103.9 (20 Oct 2024 12:46)  HR: 87 (21 Oct 2024 05:39) (87 - 115)  BP: 120/68 (21 Oct 2024 05:39) (102/60 - 130/69)  RR: 17 (21 Oct 2024 05:39) (15 - 17)  SpO2: 96% (21 Oct 2024 05:39) (93% - 96%)    ___________________________________________________________________________    LAB                        8.6    10.50 )-----------( 377      ( 21 Oct 2024 06:20 )             26.8                             9.7    5.87  )-----------( 489      ( 18 Oct 2024 09:12 )             30.7         10-21    137  |  103  |  13  ----------------------------<  118[H]  3.0[L]   |  23  |  0.79    Ca    7.6[L]      21 Oct 2024 06:20    TPro  5.5[L]  /  Alb  2.4[L]  /  TBili  0.8  /  DBili  x   /  AST  23  /  ALT  33  /  AlkPhos  172[H]  10-21    LIVER FUNCTIONS - ( 21 Oct 2024 06:20 )  Alb: 2.4 g/dL / Pro: 5.5 g/dL / ALK PHOS: 172 U/L / ALT: 33 U/L / AST: 23 U/L / GGT: x           PT/INR - ( 20 Oct 2024 13:14 )   PT: 12.0 sec;   INR: 1.02 ratio    PTT - ( 20 Oct 2024 13:14 )  PTT:29.2 sec      Urinalysis Basic - ( 21 Oct 2024 06:20 )    Color: x / Appearance: x / SG: x / pH: x  Gluc: 118 mg/dL / Ketone: x  / Bili: x / Urobili: x   Blood: x / Protein: x / Nitrite: x   Leuk Esterase: x / RBC: x / WBC x   Sq Epi: x / Non Sq Epi: x / Bacteria: x      Culture - Blood (collected 20 Oct 2024 13:14)  Source: .Blood BLOOD  Gram Stain (21 Oct 2024 07:57):    Growth in aerobic bottle: Gram Positive Cocci in Clusters  Preliminary Report (21 Oct 2024 07:58):    Growth in aerobic bottle: Gram Positive Cocci in Clusters    Culture - Blood (collected 20 Oct 2024 13:07)  Source: .Blood BLOOD  Gram Stain (21 Oct 2024 07:58):    Growth in aerobic bottle: Gram Positive Cocci in Clusters  Preliminary Report (21 Oct 2024 07:58):    Growth in aerobic bottle: Gram Positive Cocci in Clusters    Direct identification is available within approximately 3-5    hours either by Blood Panel Multiplexed PCR or Direct    MALDI-TOF. Details: [email or web address]          10-18    137  |  98  |  29[H]  ----------------------------<  127[H]  3.3[L]   |  30  |  0.69    Ca    8.5      18 Oct 2024 09:12    TPro  6.6  /  Alb  3.0[L]  /  TBili  0.4  /  DBili  x   /  AST  35  /  ALT  60[H]  /  AlkPhos  247[H]  10-18    ___________________________________________________________________________    MEDICATIONS  (STANDING):  acetaminophen     Tablet .. 975 milliGRAM(s) Oral every 8 hours  atorvastatin 40 milliGRAM(s) Oral at bedtime  aztreonam  IVPB 1000 milliGRAM(s) IV Intermittent every 8 hours  aztreonam  IVPB      calamine/zinc oxide Lotion 1 Application(s) Topical daily  DULoxetine 20 milliGRAM(s) Oral daily  enoxaparin Injectable 40 milliGRAM(s) SubCutaneous <User Schedule>  ergocalciferol 71856 Unit(s) Oral every week  erythromycin    ethylsuccinate Suspension 40 mG/mL 128 milliGRAM(s) Oral every 12 hours  lactated ringers Bolus 500 milliLiter(s) IV Bolus once  melatonin 6 milliGRAM(s) Oral at bedtime  metoprolol tartrate 12.5 milliGRAM(s) Oral every 12 hours  midodrine. 7.5 milliGRAM(s) Oral <User Schedule>  montelukast 10 milliGRAM(s) Oral daily  multivitamin 1 Tablet(s) Oral daily  polyethylene glycol 3350 17 Gram(s) Oral two times a day  pramipexole 3.75 milliGRAM(s) Oral at bedtime  pramipexole 1 milliGRAM(s) Oral <User Schedule>  pregabalin 100 milliGRAM(s) Oral three times a day  senna 2 Tablet(s) Oral at bedtime  sodium chloride 0.9%. 1000 milliLiter(s) (75 mL/Hr) IV Continuous <Continuous>  tamsulosin 0.4 milliGRAM(s) Oral at bedtime  vancomycin  IVPB 1250 milliGRAM(s) IV Intermittent every 12 hours  Vonoprazan (Voquezna) 10mg tablet 1 Tablet(s) 1 Tablet(s) Oral daily    MEDICATIONS  (PRN):  cyclobenzaprine 5 milliGRAM(s) Oral three times a day PRN Muscle Spasm  diphenhydrAMINE 25 milliGRAM(s) Oral every 8 hours PRN Rash and/or Itching  morphine   Solution 5 milliGRAM(s) Oral every 4 hours PRN Severe Pain (7 - 10)  sodium chloride 0.65% Nasal 1 Spray(s) Both Nostrils two times a day PRN Nasal Congestion    ___________________________________________________________________________    PHYSICAL EXAM:    Physical Exam: Gen - NAD, Comfortable  HEENT - NCAT, EOMI, MMM  Pulm - CTAB, No wheeze, No rhonchi, No crackles  Cardiovascular - RRR  Abdomen - Soft, mobile, firm mass in RLQ, mild TTP. +BS throughout all quadrants  Extremities - No C/C/E. No calf tenderness bl.  Neuro-     Cognitive - AAOx3     Communication - Fluent, No dysarthria     Motor -                     LEFT    UE - ShAB 5/5, EF 5/5, EE 5/5, WE 5/5,  5/5                    RIGHT UE - ShAB 5/5, EF 5/5, EE 5/5, WE 5/5,  5/5                    LEFT    LE - HF 5/5, KE 4/5, DF 5/5, PF 5/5                    RIGHT LE - HF 5/5, KE 4/5, DF 5/5, PF 5/5        Sensory - Intact to LT in bl UEs and LEs  Psychiatric - Mood stable, Affect WNL  Skin:  24 cm incision along lower midline of back. Clean, dry, intact. No drainage or skin breakdown. No pressure injuries    ___________________________________________________________________________ HPI:  Mrs. Haley French is a 74-year-old female patient with past medical history of OA, HTN, gastroparesis, peripheral neuropathy, multiple prior thoracolumbar spine surgeries for congenital scoliosis [place] >[place] of [place] w/ removal of hardware (1991) who presented to [facility] on 9/28/24 with back pain and left hip pain s/p mechanical fall. CT imaging was performed and reported a T12 three column spine fracture-malalignment with fracture extending to the adjacent right 12th posterior rib and left T12-L1 facet joint and acute, displaced fracture of the left eighth lateral rib. Chronic appearing bilateral rib deformities. Surgical management was recommended and she is S/P T9-L3 open fusion with PSO/lag screw partial corpectomy/interbody by Dr. Stroud, Neurosurgeon with complex Plastic Surgery closure by Dr. Elias on 9/29/24. Post op course c/b CSK leak s/p repair, new onset paroxsymal Afib with RVR,  increased pain and drainage from surgical incision, development of pleural effusions, symptomatic orthostatic hypotension, urinary retention, post op anemia, and chest pain with spontaneous resolution. Cardiac work up negative. Patient was evaluated by PM&R and therapy for functional deficits, gait/ADL impairments and acute rehabilitation was recommended. Patient was cleared for discharge to [facility] IRF on 10/16/24. (17 Oct 2024 10:26)    ___________________________________________________________________________    SUBJECTIVE/ROS  Patient was seen and evaluated at bedside today.  Weekend eventful fr RRT due to fever and confusion yesterday (10/20).  Imaging as below with positive blood cultures and a small right upper lobe infiltrate.   Will follow-up with ID and discuss with Hospitalist.  She was found febrile and tachycardic.  Her lactate was 2.3 --> 2.9 and then finally down to 1.7 last night and 1.4 this AM.   She received 2,750 bolus and then currently maintenance fluids of 75cc/hr.   She was placed on a cooling blanket from ICU around 230am.   Was started on IV vanc and aztreonam.   Case to be evaluated at Interdisciplinary Team meeting tomorrow.  Eager to participate on the recommended rehabilitation program.  Denies any CP, SOB, POLLARD, palpitations, fever, chills, body aches, cough, congestion, or any other symptoms at this time.   ___________________________________________________________________________    IMAGING (per official Radiology reports)    US DPLX LWR EXT VEINS COMPL BI  PROCEDURE DATE:  10/20/2024    INTERPRETATION:  CLINICAL INFORMATION: Leg pain.    IMPRESSION: No evidence of deep venous thrombosis in either lower extremity.    XR CHEST AP OR PA 1V  PROCEDURE DATE:  10/20/2024    IMPRESSION: Small right upper lobe infiltrate at this time.    CT BRAIN  PROCEDURE DATE:  10/20/2024    IMPRESSION: No evidence of acute intracranial pathology. If clinical symptoms persist or worsen, more sensitive evaluation with brain MRI may be obtained, if no contraindications exist.    ___________________________________________________________________________    Vital Signs Last 24 Hrs  T(C): 38.9 (21 Oct 2024 05:39), Max: 39.9 (20 Oct 2024 12:46)  T(F): 102 (21 Oct 2024 05:39), Max: 103.9 (20 Oct 2024 12:46)  HR: 87 (21 Oct 2024 05:39) (87 - 115)  BP: 120/68 (21 Oct 2024 05:39) (102/60 - 130/69)  RR: 17 (21 Oct 2024 05:39) (15 - 17)  SpO2: 96% (21 Oct 2024 05:39) (93% - 96%)    ___________________________________________________________________________    LAB                        8.6    10.50 )-----------( 377      ( 21 Oct 2024 06:20 )             26.8                             9.7    5.87  )-----------( 489      ( 18 Oct 2024 09:12 )             30.7         10-21    137  |  103  |  13  ----------------------------<  118[H]  3.0[L]   |  23  |  0.79    Ca    7.6[L]      21 Oct 2024 06:20    TPro  5.5[L]  /  Alb  2.4[L]  /  TBili  0.8  /  DBili  x   /  AST  23  /  ALT  33  /  AlkPhos  172[H]  10-21    LIVER FUNCTIONS - ( 21 Oct 2024 06:20 )  Alb: 2.4 g/dL / Pro: 5.5 g/dL / ALK PHOS: 172 U/L / ALT: 33 U/L / AST: 23 U/L / GGT: x           PT/INR - ( 20 Oct 2024 13:14 )   PT: 12.0 sec;   INR: 1.02 ratio    PTT - ( 20 Oct 2024 13:14 )  PTT:29.2 sec      Urinalysis Basic - ( 21 Oct 2024 06:20 )    Color: x / Appearance: x / SG: x / pH: x  Gluc: 118 mg/dL / Ketone: x  / Bili: x / Urobili: x   Blood: x / Protein: x / Nitrite: x   Leuk Esterase: x / RBC: x / WBC x   Sq Epi: x / Non Sq Epi: x / Bacteria: x      Culture - Blood (collected 20 Oct 2024 13:14)  Source: .Blood BLOOD  Gram Stain (21 Oct 2024 07:57):    Growth in aerobic bottle: Gram Positive Cocci in Clusters  Preliminary Report (21 Oct 2024 07:58):    Growth in aerobic bottle: Gram Positive Cocci in Clusters    Culture - Blood (collected 20 Oct 2024 13:07)  Source: .Blood BLOOD  Gram Stain (21 Oct 2024 07:58):    Growth in aerobic bottle: Gram Positive Cocci in Clusters  Preliminary Report (21 Oct 2024 07:58):    Growth in aerobic bottle: Gram Positive Cocci in Clusters    Direct identification is available within approximately 3-5    hours either by Blood Panel Multiplexed PCR or Direct    MALDI-TOF. Details: [email or web address]          10-18    137  |  98  |  29[H]  ----------------------------<  127[H]  3.3[L]   |  30  |  0.69    Ca    8.5      18 Oct 2024 09:12    TPro  6.6  /  Alb  3.0[L]  /  TBili  0.4  /  DBili  x   /  AST  35  /  ALT  60[H]  /  AlkPhos  247[H]  10-18    ___________________________________________________________________________    MEDICATIONS  (STANDING):  acetaminophen     Tablet .. 975 milliGRAM(s) Oral every 8 hours  atorvastatin 40 milliGRAM(s) Oral at bedtime  aztreonam  IVPB 1000 milliGRAM(s) IV Intermittent every 8 hours  aztreonam  IVPB      calamine/zinc oxide Lotion 1 Application(s) Topical daily  DULoxetine 20 milliGRAM(s) Oral daily  enoxaparin Injectable 40 milliGRAM(s) SubCutaneous <User Schedule>  ergocalciferol 06415 Unit(s) Oral every week  erythromycin    ethylsuccinate Suspension 40 mG/mL 128 milliGRAM(s) Oral every 12 hours  lactated ringers Bolus 500 milliLiter(s) IV Bolus once  melatonin 6 milliGRAM(s) Oral at bedtime  metoprolol tartrate 12.5 milliGRAM(s) Oral every 12 hours  midodrine. 7.5 milliGRAM(s) Oral <User Schedule>  montelukast 10 milliGRAM(s) Oral daily  multivitamin 1 Tablet(s) Oral daily  polyethylene glycol 3350 17 Gram(s) Oral two times a day  pramipexole 3.75 milliGRAM(s) Oral at bedtime  pramipexole 1 milliGRAM(s) Oral <User Schedule>  pregabalin 100 milliGRAM(s) Oral three times a day  senna 2 Tablet(s) Oral at bedtime  sodium chloride 0.9%. 1000 milliLiter(s) (75 mL/Hr) IV Continuous <Continuous>  tamsulosin 0.4 milliGRAM(s) Oral at bedtime  vancomycin  IVPB 1250 milliGRAM(s) IV Intermittent every 12 hours  Vonoprazan (Voquezna) 10mg tablet 1 Tablet(s) 1 Tablet(s) Oral daily    MEDICATIONS  (PRN):  cyclobenzaprine 5 milliGRAM(s) Oral three times a day PRN Muscle Spasm  diphenhydrAMINE 25 milliGRAM(s) Oral every 8 hours PRN Rash and/or Itching  morphine   Solution 5 milliGRAM(s) Oral every 4 hours PRN Severe Pain (7 - 10)  sodium chloride 0.65% Nasal 1 Spray(s) Both Nostrils two times a day PRN Nasal Congestion    ___________________________________________________________________________    PHYSICAL EXAM:    Physical Exam: Gen - NAD, Comfortable  HEENT - NCAT, EOMI, MMM  Pulm - CTAB, No wheeze, No rhonchi, No crackles  Cardiovascular - RRR  Abdomen - Soft, mobile, firm mass in RLQ, mild TTP. +BS throughout all quadrants  Extremities - No C/C/E. No calf tenderness bl.  Neuro-     Cognitive - AAOx3     Communication - Fluent, No dysarthria     Motor -                     LEFT    UE - ShAB 5/5, EF 5/5, EE 5/5, WE 5/5,  5/5                    RIGHT UE - ShAB 5/5, EF 5/5, EE 5/5, WE 5/5,  5/5                    LEFT    LE - HF 5/5, KE 4/5, DF 5/5, PF 5/5                    RIGHT LE - HF 5/5, KE 4/5, DF 5/5, PF 5/5        Sensory - Intact to LT in bl UEs and LEs  Psychiatric - Mood stable, Affect WNL  Skin:  24 cm incision along lower midline of back. Clean, dry, intact. No drainage or skin breakdown. No pressure injuries    ___________________________________________________________________________ HPI:  Mrs. Haley French is a 74-year-old female patient with past medical history of OA, HTN, gastroparesis, peripheral neuropathy, multiple prior thoracolumbar spine surgeries for congenital scoliosis [place] >[place] w/ removal of hardware (1991) who presented to [facility] on 9/28/24 with back pain and left hip pain s/p mechanical fall. CT imaging was performed and reported a T12 three column spine fracture-malalignment with fracture extending to the adjacent right 12th posterior rib and left T12-L1 facet joint and acute, displaced fracture of the left eighth lateral rib. Chronic appearing bilateral rib deformities. Surgical management was recommended and she is S/P T9-L3 open fusion with PSO/lag screw partial corpectomy/interbody by Dr. Stroud, Neurosurgeon with complex Plastic Surgery closure by Dr. Elias on 9/29/24. Post op course c/b CSK leak s/p repair, new onset paroxsymal Afib with RVR,  increased pain and drainage from surgical incision, development of pleural effusions, symptomatic orthostatic hypotension, urinary retention, post op anemia, and chest pain with spontaneous resolution. Cardiac work up negative. Patient was evaluated by PM&R and therapy for functional deficits, gait/ADL impairments and acute rehabilitation was recommended. Patient was cleared for discharge to [facility] IRF on 10/16/24. (17 Oct 2024 10:26)    ___________________________________________________________________________    SUBJECTIVE/ROS  Patient was seen and evaluated at bedside today.  Weekend eventful fr RRT due to fever and confusion yesterday (10/20).  Imaging as below with positive blood cultures and a small right upper lobe infiltrate.   Will follow-up with ID and discuss with Hospitalist.  She was found febrile and tachycardic.  Her lactate was 2.3 --> 2.9 and then finally down to 1.7 last night and 1.4 this AM.   She received 2,750 bolus and then currently maintenance fluids of 75cc/hr.   She was placed on a cooling blanket from ICU around 230am.   Was started on IV vanc and aztreonam.   Per recommendations by Hospitalist, will proceed with acute transfer to [facility] for further infectious work-up.  Her work up requires TTE and MICHAEL due to gram positive bacteremia to rule-out a valvular etiology.  CT of lumbar abd thoracic spine performed and Neurosurgeon notified for further review.    ___________________________________________________________________________    IMAGING (per official Radiology reports)    US DPLX LWR EXT VEINS COMPL BI  PROCEDURE DATE:  10/20/2024    INTERPRETATION:  CLINICAL INFORMATION: Leg pain.    IMPRESSION: No evidence of deep venous thrombosis in either lower extremity.    XR CHEST AP OR PA 1V  PROCEDURE DATE:  10/20/2024    IMPRESSION: Small right upper lobe infiltrate at this time.    CT BRAIN  PROCEDURE DATE:  10/20/2024    IMPRESSION: No evidence of acute intracranial pathology. If clinical symptoms persist or worsen, more sensitive evaluation with brain MRI may be obtained, if no contraindications exist.    ___________________________________________________________________________    Vital Signs Last 24 Hrs  T(C): 38.9 (21 Oct 2024 05:39), Max: 39.9 (20 Oct 2024 12:46)  T(F): 102 (21 Oct 2024 05:39), Max: 103.9 (20 Oct 2024 12:46)  HR: 87 (21 Oct 2024 05:39) (87 - 115)  BP: 120/68 (21 Oct 2024 05:39) (102/60 - 130/69)  RR: 17 (21 Oct 2024 05:39) (15 - 17)  SpO2: 96% (21 Oct 2024 05:39) (93% - 96%)    ___________________________________________________________________________    LAB                        8.6    10.50 )-----------( 377      ( 21 Oct 2024 06:20 )             26.8                             9.7    5.87  )-----------( 489      ( 18 Oct 2024 09:12 )             30.7         10-21    137  |  103  |  13  ----------------------------<  118[H]  3.0[L]   |  23  |  0.79    Ca    7.6[L]      21 Oct 2024 06:20    TPro  5.5[L]  /  Alb  2.4[L]  /  TBili  0.8  /  DBili  x   /  AST  23  /  ALT  33  /  AlkPhos  172[H]  10-21    LIVER FUNCTIONS - ( 21 Oct 2024 06:20 )  Alb: 2.4 g/dL / Pro: 5.5 g/dL / ALK PHOS: 172 U/L / ALT: 33 U/L / AST: 23 U/L / GGT: x           PT/INR - ( 20 Oct 2024 13:14 )   PT: 12.0 sec;   INR: 1.02 ratio    PTT - ( 20 Oct 2024 13:14 )  PTT:29.2 sec      Urinalysis Basic - ( 21 Oct 2024 06:20 )    Color: x / Appearance: x / SG: x / pH: x  Gluc: 118 mg/dL / Ketone: x  / Bili: x / Urobili: x   Blood: x / Protein: x / Nitrite: x   Leuk Esterase: x / RBC: x / WBC x   Sq Epi: x / Non Sq Epi: x / Bacteria: x      Culture - Blood (collected 20 Oct 2024 13:14)  Source: .Blood BLOOD  Gram Stain (21 Oct 2024 07:57):    Growth in aerobic bottle: Gram Positive Cocci in Clusters  Preliminary Report (21 Oct 2024 07:58):    Growth in aerobic bottle: Gram Positive Cocci in Clusters    Culture - Blood (collected 20 Oct 2024 13:07)  Source: .Blood BLOOD  Gram Stain (21 Oct 2024 07:58):    Growth in aerobic bottle: Gram Positive Cocci in Clusters  Preliminary Report (21 Oct 2024 07:58):    Growth in aerobic bottle: Gram Positive Cocci in Clusters    Direct identification is available within approximately 3-5    hours either by Blood Panel Multiplexed PCR or Direct    MALDI-TOF. Details: [email or web address]          10-18    137  |  98  |  29[H]  ----------------------------<  127[H]  3.3[L]   |  30  |  0.69    Ca    8.5      18 Oct 2024 09:12    TPro  6.6  /  Alb  3.0[L]  /  TBili  0.4  /  DBili  x   /  AST  35  /  ALT  60[H]  /  AlkPhos  247[H]  10-18    ___________________________________________________________________________    MEDICATIONS  (STANDING):  acetaminophen     Tablet .. 975 milliGRAM(s) Oral every 8 hours  atorvastatin 40 milliGRAM(s) Oral at bedtime  aztreonam  IVPB 1000 milliGRAM(s) IV Intermittent every 8 hours  aztreonam  IVPB      calamine/zinc oxide Lotion 1 Application(s) Topical daily  DULoxetine 20 milliGRAM(s) Oral daily  enoxaparin Injectable 40 milliGRAM(s) SubCutaneous <User Schedule>  ergocalciferol 01723 Unit(s) Oral every week  erythromycin    ethylsuccinate Suspension 40 mG/mL 128 milliGRAM(s) Oral every 12 hours  lactated ringers Bolus 500 milliLiter(s) IV Bolus once  melatonin 6 milliGRAM(s) Oral at bedtime  metoprolol tartrate 12.5 milliGRAM(s) Oral every 12 hours  midodrine. 7.5 milliGRAM(s) Oral <User Schedule>  montelukast 10 milliGRAM(s) Oral daily  multivitamin 1 Tablet(s) Oral daily  polyethylene glycol 3350 17 Gram(s) Oral two times a day  pramipexole 3.75 milliGRAM(s) Oral at bedtime  pramipexole 1 milliGRAM(s) Oral <User Schedule>  pregabalin 100 milliGRAM(s) Oral three times a day  senna 2 Tablet(s) Oral at bedtime  sodium chloride 0.9%. 1000 milliLiter(s) (75 mL/Hr) IV Continuous <Continuous>  tamsulosin 0.4 milliGRAM(s) Oral at bedtime  vancomycin  IVPB 1250 milliGRAM(s) IV Intermittent every 12 hours  Vonoprazan (Voquezna) 10mg tablet 1 Tablet(s) 1 Tablet(s) Oral daily    MEDICATIONS  (PRN):  cyclobenzaprine 5 milliGRAM(s) Oral three times a day PRN Muscle Spasm  diphenhydrAMINE 25 milliGRAM(s) Oral every 8 hours PRN Rash and/or Itching  morphine   Solution 5 milliGRAM(s) Oral every 4 hours PRN Severe Pain (7 - 10)  sodium chloride 0.65% Nasal 1 Spray(s) Both Nostrils two times a day PRN Nasal Congestion    ___________________________________________________________________________    PHYSICAL EXAM:    Physical Exam: Gen - NAD, Comfortable  HEENT - NCAT, EOMI, MMM  Pulm - CTAB, No wheeze, No rhonchi, No crackles  Cardiovascular - RRR  Abdomen - Soft, mobile, firm mass in RLQ, mild TTP. +BS throughout all quadrants  Extremities - No C/C/E. No calf tenderness bl.  Neuro - stable  Psychiatric - Mood stable, Affect WNL  Skin:  24 cm incision along lower midline of back with small area of mild dehiscence mid incision. No drainage.     ___________________________________________________________________________

## 2024-10-21 NOTE — PROGRESS NOTE ADULT - ASSESSMENT
Mrs. Haley French is a 74-year-old female patient with past medical history of OA, HTN, gastroparesis, peripheral neuropathy, multiple prior thoracolumbar spine surgeries for congenital scoliosis [place]/ [place] of hardware (1991) who presented to [facility] on 9/28/24 with back pain and left hip pain s/p mechanical fall. CT imaging was performed and reported a T12 three column spine fracture-malalignment with fracture extending to the adjacent right 12th posterior rib and left T12-L1 facet joint and acute, displaced fracture of the left eighth lateral rib. Chronic appearing bilateral rib deformities. Surgical management was recommended and she is S/P T9-L3 open fusion with PSO/lag screw partial corpectomy/interbody by Dr. Stroud, Neurosurgeon with complex Plastic Surgery closure by Dr. Elias on 9/29/24. Post op course c/b CSK leak s/p repair, new onset paroxsymal Afib with RVR,  increased pain and drainage from surgical incision, development of pleural effusions, symptomatic orthostatic hypotension, urinary retention, post op anemia, and chest pain with spontaneous resolution. Cardiac work up negative. Patient was evaluated by PM&R and therapy for functional deficits, gait/ADL impairments and acute rehabilitation was recommended. Patient was cleared for discharge to [facility] IRF on 10/16/24.    #Traumatic T11-12 vertebral fracture s/p corpectomy and fusion surgery   - S/P T9-L3 open fusion with PSO/lag screw partial corpectomy/interbody with complex plastic closure ( w/ Dr. Stroud and Dr. Tinajero on 9/29)       * CT Lumbar Spine (9/28/24): T12 three column spine fracture-malalignment with fracture extending to the adjacent right 12th posterior rib and left T12-L1 facet joint. and acute displaced fracture of the left eighth lateral rib.      * CT Lumbar Spine (10/5/24): Status post spinal fusion of T9-L3 with bilateral rods and screws and cylindrical cage within the fractured T12 vertebral body. Resection of the LEFT T12 pedicle and a LEFT hemilaminectomy. Hardware appears intact. No evidence of hardware loosening. Expected postoperative changes.       * Spinal/fall precautions      * TLSO when OOB      * ICE PRN      * Pain management: Tylenol 975mg ATC, Duloxetine 20mg daily, Lyrica 100mg TID , Flexeril 5mg TID PRN  - Activity Limitations: Decreased social, vocational and leisure activities, decreased self care and ADLs, decreased mobility, decreased ability to manage household and finances.   - Comprehensive Multidisciplinary Rehab Program:      * 3 hours a day, 5 days a week.      * PT 2hr/day: Focused on improving strength, endurance, coordination, balance, functional mobility, and transfers      * OT 1hr/day: Focused on improving strength, fine motor skills, coordination, posture and ADLs.      -----------------------------------------------------------------------------------  Concurrent Medical Problems    #Orthostatic hypotension  - Monitor daily  - S/P 1L IVF bolus (10/13) for symptomatic hypotension  - Losartan 100mg daily HOLD  - Chlorthalidone 25mg daily HOLD (may restart if SBP is consistently above 140 per cardio)  - S/p Midodrine     #Seasonal allergies/Nasal congestion  - Singulair 10mg daily   - [place] spray BID PRN    #HLD  - Atorvastatin 40mg HS    #Paroxysmal A-Fib  - TTE 61%  - S/p IV Lopressor x 1   - Metoprolol 25mg BID for rate control  - Monitor, if continue to be in A-fib, will need full dose AC per cardio    #Hyponatremia (resolved)  - Likely 2/2 SIADH in setting of pain and poor solute intake  - Liberalize fluid restriction in setting of soft BP  - Salt tabs 1g TID today  - Monitor Na (10/16 Na 138)    #Post op Anemia.   - S/p PRBCs intraoperatively   - Hb stable with no evidence of active bleed at this time.   - Monitor H/H (10/16 Hgb 9.5)    #SILVANA (obstructive sleep apnea).   · Trialed on RA bedside. when awake and speaking with me, maintains SpO2>92%, however several time she dozed off    during exam during which she would have transient desaturations.  - Nocturnal 2L O2 and PRN during day during naps  - F/u with PCP outpatient as may benefit from sleep study and CPAP.    #Pleural effusion   - 10/10 CT Abd/pelvis- neg for GI bleed  - S/p one dose of Lasix with significant output  - repeat CXR 10/15 with resolution of pleural effusions    #Liver cyst  -10/10 CT Abd/pelvis- Thereare is a large cyst, stable since prior exam. There are small hypodense foci on segment II and segment I too small to characterize, unchanged since prior.  -Outpatient f/u     #Restless Leg Syndrome  -Pramipexole 1mg daily/ 3.75mg HS    #Elevated alkaline phosphatase level (increasing)  -Outpatient f/u with PCP (10/16 Alk Phos 232)    #Mood/Cognition/Depression  Neuropsychology consult PRN  -Duloxetine 20mg daily    #Sleep:   Maintain quiet hours and low stim environment.  Melatonin 6mg HS to maximize participation in therapy during the day.     #GI/Bowel:  Senna QHS, Miralax PRN Daily  GI ppx: Pantoprazole 40mg daily, Voquenza 10mg daily , Eryped 128mg BID     #/Bladder/Urinary retention  - PVR q 8 hours (SC if > 400)  -Flomax 0.4mg HS     #Skin/Pressure Injury:   - Skin assessment on admission: Midline surgical incision c/d/i. No pressure injuries.    #Diet/Supplements  Current Diet: Regular  -Multivitamin daily   -Ergocalciferol 1.25mg weekly    #Precautions / PROPHYLAXIS:  - Falls,  Spinal  - ortho: Weight bearing status: WBAT, TLSO brace OOB   - Lungs: Incentive Spirometer   - DVT ppx: Lovenox 40mg daily   - Pressure injury/Skin:  OOB to Chair, PT/OT      ---------------  Code Status: FULL  Emergency Contact:    Outpatient Follow-up (Specialty/Name of physician):    Cait Watkins  Cardiovascular Disease  [address], [address] [place] [address]  Phone: [phone]  Fax: [phone]  Follow Up Time: 2 weeks    Eitan Stroud  Neurosurgery  [address] [facility], [address]  [place] [address]  Phone: [phone]  Fax: [phone]  Follow Up Time: 2 weeks    Daubs, Abdoulaye  [facility]  ORTHOSURG [address] [facility]  Scheduled Appointment: 10/22/2024    Angie Koch  [facility]  DERM [address]  Scheduled Appointment: 10/22/2024    Guille Lance  [facility]  ORTHOSURG [address] [facility]  Scheduled Appointment: 11/22/2024    Catalino Cotto  [facility]  WEIGHTMGMT  [facility]  Scheduled Appointment: 12/05/2024    Haydee Bernstein  [facility]  GASTRO [address] [facility] [address]  Scheduled Appointment: 01/06/2025      -------------- Mrs. Haley French is a 74-year-old female patient with past medical history of OA, HTN, gastroparesis, peripheral neuropathy, multiple prior thoracolumbar spine surgeries for congenital scoliosis done >[place] ago [place] of [place] w/ removal of hardware (1991) who presented to [facility] on 9/28/24 with back pain and left hip pain s/p mechanical fall. CT imaging was performed and reported a T12 three column spine fracture-malalignment with fracture extending to the adjacent right 12th posterior rib and left T12-L1 facet joint and acute, displaced fracture of the left eighth lateral rib. Chronic appearing bilateral rib deformities. Surgical management was recommended and she is S/P T9-L3 open fusion with PSO/lag screw partial corpectomy/interbody by Dr. Stroud, Neurosurgeon with complex Plastic Surgery closure by Dr. Elias on 9/29/24. Post op course c/b CSK leak s/p repair, new onset paroxsymal Afib with RVR,  increased pain and drainage from surgical incision, development of pleural effusions, symptomatic orthostatic hypotension, urinary retention, post op anemia, and chest pain with spontaneous resolution. Cardiac work up negative. Patient was evaluated by PM&R and therapy for functional deficits, gait/ADL impairments and acute rehabilitation was recommended. Patient was cleared for discharge to [facility] IRF on 10/16/24.    #Traumatic T11-12 vertebral fracture s/p corpectomy and fusion surgery   - S/P T9-L3 open fusion with PSO/lag screw partial corpectomy/interbody with complex plastic closure ( w/ Dr. Stroud and Dr. Tinajero on 9/29)       * CT Lumbar Spine (9/28/24): T12 three column spine fracture-malalignment with fracture extending to the adjacent right 12th posterior rib and left T12-L1 facet joint. and acute displaced fracture of the left eighth lateral rib.      * CT Lumbar Spine (10/5/24): Status post spinal fusion of T9-L3 with bilateral rods and screws and cylindrical cage within the fractured T12 vertebral body. Resection of the LEFT T12 pedicle and a LEFT hemilaminectomy. Hardware appears intact. No evidence of hardware loosening. Expected postoperative changes.       * Spinal/fall precautions      * TLSO when OOB      * ICE PRN      * Pain management: Tylenol 975mg ATC, Duloxetine 20mg daily, Lyrica 100mg TID , Flexeril 5mg TID PRN  - Activity Limitations: Decreased social, vocational and leisure activities, decreased self care and ADLs, decreased mobility, decreased ability to manage household and finances.     Gram positive bacteremia  - Acute transfer to [facility]    -----------------------------------------------------------------------------------  Concurrent Medical Problems    #Orthostatic hypotension  - Monitor daily  - S/P 1L IVF bolus (10/13) for symptomatic hypotension  - Losartan 100mg daily HOLD  - Chlorthalidone 25mg daily HOLD (may restart if SBP is consistently above 140 per cardio)  - S/p Midodrine     #Seasonal allergies/Nasal congestion  - Singulair 10mg daily   - [place] spray BID PRN    #HLD  - Atorvastatin 40mg HS    #Paroxysmal A-Fib  - TTE 61%  - S/p IV Lopressor x 1   - Metoprolol 25mg BID for rate control  - Monitor, if continue to be in A-fib, will need full dose AC per cardio    #Hyponatremia (resolved)  - Likely 2/2 SIADH in setting of pain and poor solute intake  - Liberalize fluid restriction in setting of soft BP  - Salt tabs 1g TID today  - Monitor Na (10/16 Na 138)    #Post op Anemia.   - S/p PRBCs intraoperatively   - Hb stable with no evidence of active bleed at this time.   - Monitor H/H (10/16 Hgb 9.5)    #SILVANA (obstructive sleep apnea).   · Trialed on RA bedside. when awake and speaking with me, maintains SpO2>92%, however several time she dozed off    during exam during which she would have transient desaturations.  - Nocturnal 2L O2 and PRN during day during naps  - F/u with PCP outpatient as may benefit from sleep study and CPAP.    #Pleural effusion   - 10/10 CT Abd/pelvis- neg for GI bleed  - S/p one dose of Lasix with significant output  - repeat CXR 10/15 with resolution of pleural effusions    #Liver cyst  -10/10 CT Abd/pelvis- Thereare is a large cyst, stable since prior exam. There are small hypodense foci on segment II and segment I too small to characterize, unchanged since prior.  -Outpatient f/u     #Restless Leg Syndrome  -Pramipexole 1mg daily/ 3.75mg HS    #Elevated alkaline phosphatase level (increasing)  -Outpatient f/u with PCP (10/16 Alk Phos 232)    #Mood/Cognition/Depression  Neuropsychology consult PRN  -Duloxetine 20mg daily    #Sleep:   Maintain quiet hours and low stim environment.  Melatonin 6mg HS to maximize participation in therapy during the day.     #GI/Bowel:  Senna QHS, Miralax PRN Daily  GI ppx: Pantoprazole 40mg daily, Voquenza 10mg daily , Eryped 128mg BID     #/Bladder/Urinary retention  - PVR q 8 hours (SC if > 400)  -Flomax 0.4mg HS     #Skin/Pressure Injury:   - Skin assessment on admission: Midline surgical incision c/d/i. No pressure injuries.    #Diet/Supplements  Current Diet: Regular  -Multivitamin daily   -Ergocalciferol 1.25mg weekly    #Precautions / PROPHYLAXIS:  - Falls,  Spinal  - ortho: Weight bearing status: WBAT, TLSO brace OOB   - Lungs: Incentive Spirometer   - DVT ppx: Lovenox 40mg daily   - Pressure injury/Skin:  OOB to Chair, PT/OT      ---------------  Code Status: FULL  Emergency Contact:    Outpatient Follow-up (Specialty/Name of physician):    Cait Watkins  Cardiovascular Disease  [address], [address] [place] [address]  Phone: [phone]  Fax: [phone]  Follow Up Time: 2 weeks    Eitan Stroud  Neurosurgery  [address] [facility], [address]  [place] [address]  Phone: [phone]  Fax: [phone]  Follow Up Time: 2 weeks    Abdoulaye Mercedes  [facility]  ORTHOSURG [address] [facility]  Scheduled Appointment: 10/22/2024    Angie Koch  [facility]  DERM [address]  Scheduled Appointment: 10/22/2024    Guille Lance  [facility]  ORTHOSURG [address] [facility]  Scheduled Appointment: 11/22/2024    Catalino Cotto  [facility] Physician [facility]  WEIGHTMGMT  [facility]  Scheduled Appointment: 12/05/2024    Haydee Bernstein  [facility] Physician [facility]  GASTRO [address] [facility]  Scheduled Appointment: 01/06/2025      --------------

## 2024-10-22 ENCOUNTER — TRANSCRIPTION ENCOUNTER (OUTPATIENT)
Age: 74
End: 2024-10-22

## 2024-10-22 ENCOUNTER — APPOINTMENT (OUTPATIENT)
Dept: ORTHOPEDIC SURGERY | Facility: CLINIC | Age: 74
End: 2024-10-22

## 2024-10-22 ENCOUNTER — APPOINTMENT (OUTPATIENT)
Dept: DERMATOLOGY | Facility: CLINIC | Age: 74
End: 2024-10-22

## 2024-10-22 VITALS
RESPIRATION RATE: 15 BRPM | OXYGEN SATURATION: 96 % | HEART RATE: 121 BPM | DIASTOLIC BLOOD PRESSURE: 76 MMHG | SYSTOLIC BLOOD PRESSURE: 145 MMHG | TEMPERATURE: 100 F

## 2024-10-22 LAB
-  CLINDAMYCIN: SIGNIFICANT CHANGE UP
-  ERYTHROMYCIN: SIGNIFICANT CHANGE UP
-  GENTAMICIN: SIGNIFICANT CHANGE UP
-  OXACILLIN: SIGNIFICANT CHANGE UP
-  PENICILLIN: SIGNIFICANT CHANGE UP
-  RIFAMPIN: SIGNIFICANT CHANGE UP
-  TETRACYCLINE: SIGNIFICANT CHANGE UP
-  TRIMETHOPRIM/SULFAMETHOXAZOLE: SIGNIFICANT CHANGE UP
-  VANCOMYCIN: SIGNIFICANT CHANGE UP
CULTURE RESULTS: ABNORMAL
CULTURE RESULTS: ABNORMAL
METHOD TYPE: SIGNIFICANT CHANGE UP
ORGANISM # SPEC MICROSCOPIC CNT: ABNORMAL
ORGANISM # SPEC MICROSCOPIC CNT: ABNORMAL
ORGANISM # SPEC MICROSCOPIC CNT: SIGNIFICANT CHANGE UP
SPECIMEN SOURCE: SIGNIFICANT CHANGE UP
SPECIMEN SOURCE: SIGNIFICANT CHANGE UP
VANCOMYCIN TROUGH SERPL-MCNC: 15.3 UG/ML — SIGNIFICANT CHANGE UP (ref 10–20)

## 2024-10-22 PROCEDURE — 99232 SBSQ HOSP IP/OBS MODERATE 35: CPT

## 2024-10-22 PROCEDURE — 99233 SBSQ HOSP IP/OBS HIGH 50: CPT

## 2024-10-22 RX ORDER — PETROLATUM 987.89 MG/G
1 OINTMENT TOPICAL
Qty: 0 | Refills: 0 | DISCHARGE
Start: 2024-10-22

## 2024-10-22 RX ORDER — CEFEPIME 2 G/1
2000 INJECTION, POWDER, FOR SOLUTION INTRAVENOUS ONCE
Refills: 0 | Status: COMPLETED | OUTPATIENT
Start: 2024-10-22 | End: 2024-10-22

## 2024-10-22 RX ORDER — CEFEPIME 2 G/1
2000 INJECTION, POWDER, FOR SOLUTION INTRAVENOUS
Qty: 0 | Refills: 0 | DISCHARGE
Start: 2024-10-22

## 2024-10-22 RX ORDER — CEFEPIME 2 G/1
INJECTION, POWDER, FOR SOLUTION INTRAVENOUS
Refills: 0 | Status: DISCONTINUED | OUTPATIENT
Start: 2024-10-22 | End: 2024-10-23

## 2024-10-22 RX ORDER — CEFEPIME 2 G/1
2000 INJECTION, POWDER, FOR SOLUTION INTRAVENOUS EVERY 8 HOURS
Refills: 0 | Status: DISCONTINUED | OUTPATIENT
Start: 2024-10-22 | End: 2024-10-23

## 2024-10-22 RX ADMIN — MORPHINE SULFATE 5 MILLIGRAM(S): 30 TABLET, EXTENDED RELEASE ORAL at 18:01

## 2024-10-22 RX ADMIN — Medication 0.4 MILLIGRAM(S): at 21:35

## 2024-10-22 RX ADMIN — PRAMIPEXOLE DIHYDROCHLORIDE 3.75 MILLIGRAM(S): 0.12 TABLET ORAL at 21:36

## 2024-10-22 RX ADMIN — DULOXETINE HYDROCHLORIDE 20 MILLIGRAM(S): 30 CAPSULE, DELAYED RELEASE ORAL at 11:33

## 2024-10-22 RX ADMIN — Medication 975 MILLIGRAM(S): at 05:24

## 2024-10-22 RX ADMIN — Medication 1 TABLET(S): at 11:33

## 2024-10-22 RX ADMIN — CYCLOBENZAPRINE HYDROCHLORIDE 5 MILLIGRAM(S): 30 CAPSULE, EXTENDED RELEASE ORAL at 23:53

## 2024-10-22 RX ADMIN — AZTREONAM 50 MILLIGRAM(S): KIT at 16:46

## 2024-10-22 RX ADMIN — CYCLOBENZAPRINE HYDROCHLORIDE 5 MILLIGRAM(S): 30 CAPSULE, EXTENDED RELEASE ORAL at 00:56

## 2024-10-22 RX ADMIN — CEFEPIME 100 MILLIGRAM(S): 2 INJECTION, POWDER, FOR SOLUTION INTRAVENOUS at 21:47

## 2024-10-22 RX ADMIN — CEFEPIME 100 MILLIGRAM(S): 2 INJECTION, POWDER, FOR SOLUTION INTRAVENOUS at 18:00

## 2024-10-22 RX ADMIN — Medication 12.5 MILLIGRAM(S): at 05:24

## 2024-10-22 RX ADMIN — MIDODRINE HYDROCHLORIDE 7.5 MILLIGRAM(S): 2.5 TABLET ORAL at 08:53

## 2024-10-22 RX ADMIN — Medication 128 MILLIGRAM(S): at 05:24

## 2024-10-22 RX ADMIN — Medication 12.5 MILLIGRAM(S): at 18:00

## 2024-10-22 RX ADMIN — Medication 975 MILLIGRAM(S): at 14:57

## 2024-10-22 RX ADMIN — PRAMIPEXOLE DIHYDROCHLORIDE 1 MILLIGRAM(S): 0.12 TABLET ORAL at 06:27

## 2024-10-22 RX ADMIN — Medication 975 MILLIGRAM(S): at 14:22

## 2024-10-22 RX ADMIN — AZTREONAM 50 MILLIGRAM(S): KIT at 05:23

## 2024-10-22 RX ADMIN — Medication 975 MILLIGRAM(S): at 21:34

## 2024-10-22 RX ADMIN — Medication 40 MILLIGRAM(S): at 18:00

## 2024-10-22 RX ADMIN — MONTELUKAST SODIUM 10 MILLIGRAM(S): 10 TABLET, FILM COATED ORAL at 11:33

## 2024-10-22 RX ADMIN — MORPHINE SULFATE 5 MILLIGRAM(S): 30 TABLET, EXTENDED RELEASE ORAL at 22:41

## 2024-10-22 RX ADMIN — MORPHINE SULFATE 5 MILLIGRAM(S): 30 TABLET, EXTENDED RELEASE ORAL at 08:57

## 2024-10-22 RX ADMIN — PETROLATUM 1 APPLICATION(S): 987.89 OINTMENT TOPICAL at 17:35

## 2024-10-22 RX ADMIN — PREGABALIN 100 MILLIGRAM(S): 150 CAPSULE ORAL at 14:22

## 2024-10-22 RX ADMIN — Medication 6 MILLIGRAM(S): at 22:21

## 2024-10-22 RX ADMIN — Medication 975 MILLIGRAM(S): at 05:54

## 2024-10-22 RX ADMIN — MORPHINE SULFATE 5 MILLIGRAM(S): 30 TABLET, EXTENDED RELEASE ORAL at 11:44

## 2024-10-22 RX ADMIN — PETROLATUM 1 APPLICATION(S): 987.89 OINTMENT TOPICAL at 06:27

## 2024-10-22 RX ADMIN — Medication 40 MILLIGRAM(S): at 21:36

## 2024-10-22 RX ADMIN — Medication 2 TABLET(S): at 21:37

## 2024-10-22 RX ADMIN — CYCLOBENZAPRINE HYDROCHLORIDE 5 MILLIGRAM(S): 30 CAPSULE, EXTENDED RELEASE ORAL at 06:27

## 2024-10-22 RX ADMIN — PREGABALIN 100 MILLIGRAM(S): 150 CAPSULE ORAL at 21:37

## 2024-10-22 RX ADMIN — VANCOMYCIN HYDROCHLORIDE 166.67 MILLIGRAM(S): KIT at 11:33

## 2024-10-22 RX ADMIN — Medication 975 MILLIGRAM(S): at 22:41

## 2024-10-22 RX ADMIN — MORPHINE SULFATE 5 MILLIGRAM(S): 30 TABLET, EXTENDED RELEASE ORAL at 21:38

## 2024-10-22 RX ADMIN — PREGABALIN 100 MILLIGRAM(S): 150 CAPSULE ORAL at 05:24

## 2024-10-22 RX ADMIN — CYCLOBENZAPRINE HYDROCHLORIDE 5 MILLIGRAM(S): 30 CAPSULE, EXTENDED RELEASE ORAL at 14:22

## 2024-10-22 RX ADMIN — PETROLATUM 1 APPLICATION(S): 987.89 OINTMENT TOPICAL at 11:34

## 2024-10-22 NOTE — PROGRESS NOTE ADULT - REASON FOR ADMISSION
Traumatic T11-12 vertebral fracture s/p corpectomy and fusion surgery

## 2024-10-22 NOTE — DISCHARGE NOTE NURSING/CASE MANAGEMENT/SOCIAL WORK - FINANCIAL ASSISTANCE
[facility] provides services at a reduced cost to those who are determined to be eligible through [facility]’s financial assistance program. Information regarding [facility]’s financial assistance program can be found by going to [email or web address] or by calling [phone].

## 2024-10-22 NOTE — PROGRESS NOTE ADULT - ASSESSMENT
Mrs. Haley French is a 74-year-old female patient with past medical history of OA, HTN, gastroparesis, peripheral neuropathy, multiple prior thoracolumbar spine surgeries for congenital scoliosis [place] w/ removal of hardware (1991) who presented to [facility] on 9/28/24 with back pain and left hip pain s/p mechanical fall. CT imaging was performed and reported a T12 three column spine fracture-malalignment with fracture extending to the adjacent right 12th posterior rib and left T12-L1 facet joint and acute, displaced fracture of the left eighth lateral rib. Chronic appearing bilateral rib deformities. Surgical management was recommended and she is S/P T9-L3 open fusion with PSO/lag screw partial corpectomy/interbody by Dr. Stroud, Neurosurgeon with complex Plastic Surgery closure by Dr. Elias on 9/29/24. Post op course c/b CSK leak s/p repair, new onset paroxsymal Afib with RVR,  increased pain and drainage from surgical incision, development of pleural effusions, symptomatic orthostatic hypotension, urinary retention, post op anemia, and chest pain with spontaneous resolution. Cardiac work up negative. Patient was evaluated by PM&R and therapy for functional deficits, gait/ADL impairments and acute rehabilitation was recommended. Patient was cleared for discharge to [facility] IRF on 10/16/24.    #Traumatic T11-12 vertebral fracture s/p corpectomy and fusion surgery   - S/P T9-L3 open fusion with PSO/lag screw partial corpectomy/interbody with complex plastic closure ( w/ Dr. Stroud and Dr. Tinajero on 9/29)       * CT Lumbar Spine (9/28/24): T12 three column spine fracture-malalignment with fracture extending to the adjacent right 12th posterior rib and left T12-L1 facet joint. and acute displaced fracture of the left eighth lateral rib.      * CT Lumbar Spine (10/5/24): Status post spinal fusion of T9-L3 with bilateral rods and screws and cylindrical cage within the fractured T12 vertebral body. Resection of the LEFT T12 pedicle and a LEFT hemilaminectomy. Hardware appears intact. No evidence of hardware loosening. Expected postoperative changes.       * Spinal/fall precautions      * TLSO when OOB      * ICE PRN      * Pain management: Tylenol 975mg ATC, Duloxetine 20mg daily, Lyrica 100mg TID , Flexeril 5mg TID PRN  - Activity Limitations: Decreased social, vocational and leisure activities, decreased self care and ADLs, decreased mobility, decreased ability to manage household and finances.     Gram positive bacteremia  - Acute transfer to [facility]    -----------------------------------------------------------------------------------  Concurrent Medical Problems    #Orthostatic hypotension  - Monitor daily  - S/P 1L IVF bolus (10/13) for symptomatic hypotension  - Losartan 100mg daily HOLD  - Chlorthalidone 25mg daily HOLD (may restart if SBP is consistently above 140 per cardio)  - S/p Midodrine     #Seasonal allergies/Nasal congestion  - Singulair 10mg daily   - [place] spray BID PRN    #HLD  - Atorvastatin 40mg HS    #Paroxysmal A-Fib  - TTE 61%  - S/p IV Lopressor x 1   - Metoprolol 25mg BID for rate control  - Monitor, if continue to be in A-fib, will need full dose AC per cardio    #Hyponatremia (resolved)  - Likely 2/2 SIADH in setting of pain and poor solute intake  - Liberalize fluid restriction in setting of soft BP  - Salt tabs 1g TID today  - Monitor Na (10/16 Na 138)    #Post op Anemia.   - S/p PRBCs intraoperatively   - Hb stable with no evidence of active bleed at this time.   - Monitor H/H (10/16 Hgb 9.5)    #SILVANA (obstructive sleep apnea).   · Trialed on RA bedside. when awake and speaking with me, maintains SpO2>92%, however several time she dozed off    during exam during which she would have transient desaturations.  - Nocturnal 2L O2 and PRN during day during naps  - F/u with PCP outpatient as may benefit from sleep study and CPAP.    #Pleural effusion   - 10/10 CT Abd/pelvis- neg for GI bleed  - S/p one dose of Lasix with significant output  - repeat CXR 10/15 with resolution of pleural effusions    #Liver cyst  -10/10 CT Abd/pelvis- Thereare is a large cyst, stable since prior exam. There are small hypodense foci on segment II and segment I too small to characterize, unchanged since prior.  -Outpatient f/u     #Restless Leg Syndrome  -Pramipexole 1mg daily/ 3.75mg HS    #Elevated alkaline phosphatase level (increasing)  -Outpatient f/u with PCP (10/16 Alk Phos 232)    #Mood/Cognition/Depression  Neuropsychology consult PRN  -Duloxetine 20mg daily    #Sleep:   Maintain quiet hours and low stim environment.  Melatonin 6mg HS to maximize participation in therapy during the day.     #GI/Bowel:  Senna QHS, Miralax PRN Daily  GI ppx: Pantoprazole 40mg daily, Voquenza 10mg daily , Eryped 128mg BID     #/Bladder/Urinary retention  - PVR q 8 hours (SC if > 400)  -Flomax 0.4mg HS     #Skin/Pressure Injury:   - Skin assessment on admission: Midline surgical incision c/d/i. No pressure injuries.    #Diet/Supplements  Current Diet: Regular  -Multivitamin daily   -Ergocalciferol 1.25mg weekly    #Precautions / PROPHYLAXIS:  - Falls,  Spinal  - ortho: Weight bearing status: WBAT, TLSO brace OOB   - Lungs: Incentive Spirometer   - DVT ppx: Lovenox 40mg daily   - Pressure injury/Skin:  OOB to Chair, PT/OT      ---------------  Code Status: FULL  Emergency Contact:    Outpatient Follow-up (Specialty/Name of physician):    Cait Watkins  Cardiovascular Disease  [address], [address] [place] [address]  Phone: [phone]  Fax: [phone]  Follow Up Time: 2 weeks    Eitan Stroud  Neurosurgery  [address] [facility], [address]  [place] [address]  Phone: [phone]  Fax: [phone]  Follow Up Time: 2 weeks    Abdoulaye Mercedes  [facility]  ORTHOSURG [address] [facility]  Scheduled Appointment: 10/22/2024    Angie Koch  [facility]  DERM [address]  Scheduled Appointment: 10/22/2024    Guille Lance  [facility]  ORTHOSURG [address] [facility]  Scheduled Appointment: 11/22/2024    Catalino Cotto  [facility] Physician [facility]  WEIGHTMGMT  [facility]  Scheduled Appointment: 12/05/2024    Haydee Bernstein  [facility] Physician [facility]  GASTRO [address] [facility]  Scheduled Appointment: 01/06/2025      --------------

## 2024-10-22 NOTE — PROGRESS NOTE ADULT - PROVIDER SPECIALTY LIST ADULT
Physiatry
Hospitalist
Rehab Medicine
Hospitalist
Infectious Disease
Rehab Medicine
Hospitalist
Physiatry
Physiatry

## 2024-10-22 NOTE — DISCHARGE NOTE NURSING/CASE MANAGEMENT/SOCIAL WORK - PATIENT PORTAL LINK FT
You can access the FollowMyHealth Patient Portal offered by [facility] by registering at the following website: [email or web address]. By joining [facility] FollowMyHealth portal, you will also be able to view your health information using other applications (apps) compatible with our system.

## 2024-10-22 NOTE — PROGRESS NOTE ADULT - TIME BILLING
review of labs, imaging  review of clinical documents  face to face encounter   coordination of care with primary team  A/P and documentation of encounter
review of labs, imaging  review of clinical documents  face to face encounter   coordination of care with primary team  A/P and documentation of encounter

## 2024-10-22 NOTE — PROGRESS NOTE ADULT - WEIGHT IN LBS
Patient Instructions by Stuart Segura DO at 10/23/18 05:08 PM     Author:  Stuart Segura DO Service:  (none) Author Type:  Physician     Filed:  10/23/18 05:09 PM Encounter Date:  10/23/2018 Status:  Signed     :  Stuart Segura DO (Physician)              ASSESSMENT   Bilateral  otitis media  Sinusitis     Plan  - Take medication as prescribed.  Side effects of the medications prescribed discussed.    -Symptomatic treatment such as pushing fluids and over the counter medication such as mucinex  for cough/ sinus congestion recommended to try ONLY IF tolerated.    -Overall expect gradual improvement of ear pain over a few days and of sinus symptoms on medications over 5-7 days and often sooner.  If not improving then should get a recheck.  If any worsening, changes in symptoms or onset of new symptoms of concern then must recheck sooner.      Thank you for your visit.  Please call or reach out to Dr. Segura or her staff if you have any remaining questions.  [facility] is here for you and your family. We appreciate your business.  Take good care!           Revision History        User Key Date/Time User Provider Type Action    > [N/A] 10/23/18 05:09 PM Stuart Segura DO Physician Sign            
196.8

## 2024-10-22 NOTE — PROGRESS NOTE ADULT - ASSESSMENT
74-year-old female with past medical history of OA, HTN, gastroparesis, peripheral neuropathy, multiple prior thoracolumbar spine surgeries for congenital scoliosis [place] >[place] w/ removal of hardware (1991) who presented to [facility] on 9/28/24 with back pain and left hip pain s/p mechanical fall and was found to have t12 three column spine fracture extending to R 12TH Posterior rib and left T12-L1 facet joint, also with L 8th lateral rib fracture. Underwent  T9-L3 open fusion with PSO/lag screw partial corpectomy/interbody by Dr. Stroud, Neurosurgeon with complex Plastic Surgery closure by Dr. Elias on 9/29/24. Post op course c/b CSK leak s/p repair, new onset paroxsymal Afib with RVR,  increased pain and drainage from surgical incision, development of pleural effusions, symptomatic orthostatic hypotension, urinary retention, post op anemia, and chest pain with spontaneous resolution.    D/c to [facility] on 10/16 and course c/b Tmax 103.9F on 10/19 and found with MSSA bacteremia. pending transfer to [facility] for infectious workup and rule out hardwire infection.     #sepsis due to MSSA bacteremia, suspect hardwire/spinal infection  - CT T/L spine without contrast ordered, was not revealing for infectious source. Recommend repeat with and without contrast for better visualization. Continuing empiric antibiotics at this time. Verified vanco dosing with pharmacy today. obtain trough prior to 4th dose.  - ID consulted, reccs appreciated  - CXR with old rib fracture and small RUL infiltrate  - +UA for Ecoli, continue IV aztreonam. follow urine culture. appreciated and agree with ID reccs regarding remote PCN allergy and should be able to tolerate cephalosporins  - lactic acidosis resolved.    #Traumatic T11-12 vertebral fracture s/p corpectomy and fusion surgery   - S/P T9-L3 open fusion with PSO/lag screw partial corpectomy/interbody with complex plastic closure (w/ Dr. Stroud and Dr. Tinajero on 9/29)   - CT Lumbar Spine (9/28/24): T12 three column spine fracture-malalignment with fracture extending to the adjacent right 12th posterior rib and left T12-L1 facet joint. and acute displaced fracture of the left eighth lateral rib.  - CT Lumbar Spine (10/5/24): Status post spinal fusion of T9-L3 with bilateral rods and screws and cylindrical cage within the fractured T12 vertebral body. Resection of the LEFT T12 pedicle and a LEFT hemilaminectomy. Hardware appears intact. No evidence of hardware loosening. Expected postoperative changes.   - TLSO when OOB  - Pain management: Tylenol 975mg ATC, Duloxetine 20mg daily, Lyrica 100mg TID , Flexeril 5mg TID PRN  - pt/ot/dvt ppx/ pain meds    #LLE tenderness on palpation   - Doppler ultrasound (10/15/24): No evidence of deep venous thrombosis in either lower extremity.    # PAF, resolved  #HTN  #Orthostatic hypotension, resolved  - TTE 61%  - S/P 1L IVF bolus (10/13) for symptomatic hypotension  - patient converted back to SR. if AF is persistent will need AC  - HOLDING Losartan 100 mg daily and Chlorthalidone 25 mg daily (restart if SBP is consistently above 140 per cardio)  - Continue Metoprolol to 12.5 BID  - Continue Midodrine 7.5 mg AM (wean off as tolerated)     #Seasonal allergies/Nasal congestion  - Singulair     #HLD  - Atorvastatin     #Hyponatremia (resolved)  - Likely 2/2 SIADH in setting of pain and poor solute intake    #Post op Anemia , stable  - S/p PRBCs intraoperatively   - Hb stable with no evidence of active bleed at this time.   - Monitor H/H    #SILVANA (obstructive sleep apnea).   - Transient desaturations on RA    - c/w Nocturnal 2L O2 and PRN during day during naps, monitor sats   - F/u with PCP outpatient as may benefit from sleep study and CPAP.    #Liver cyst  -10/10 CT Abd/pelvis- There is a large cyst, stable since prior exam. There are small hypodense foci on segment II and segment I too small to characterize, unchanged since prior.  -Outpatient f/u     #Restless Leg Syndrome  -Pramipexole     #Elevated alkaline phosphatase level   - monitor     #Depression  -Duloxetine     # Gastritis-  Pantoprazole    #Urinary retention, resolved  - PVR q 8 hours (SC if > 400)  - Flomax     #DVT ppx: Lovenox     will follow    d/w rehab team, pending [facility] transfer.  discussed above with patient.      74-year-old female with past medical history of OA, HTN, gastroparesis, peripheral neuropathy, multiple prior thoracolumbar spine surgeries for congenital scoliosis done >10 years ago [place] of [place] w/ removal of hardware (1991) who presented to [facility] on 9/28/24 with back pain and left hip pain s/p mechanical fall and was found to have t12 three column spine fracture extending to R 12TH Posterior rib and left T12-L1 facet joint, also with L 8th lateral rib fracture. Underwent  T9-L3 open fusion with PSO/lag screw partial corpectomy/interbody by Dr. Stroud, Neurosurgeon with complex Plastic Surgery closure by Dr. Elisa on 9/29/24. Post op course c/b CSK leak s/p repair, new onset paroxsymal Afib with RVR,  increased pain and drainage from surgical incision, development of pleural effusions, symptomatic orthostatic hypotension, urinary retention, post op anemia, and chest pain with spontaneous resolution.    D/c to [facility] on 10/16 and course c/b Tmax 103.9F on 10/19 and found with MSSA bacteremia. pending transfer to [facility] for infectious workup and rule out hardwire infection.     #sepsis due to MSSA bacteremia, suspect hardwire/spinal infection  - CT T/L spine without contrast ordered, was not revealing for infectious source. Recommend repeat with and without contrast for better visualization. Continuing empiric antibiotics at this time. Verified vanco dosing with pharmacy today. obtain trough prior to 4th dose.  - ID consulted, reccs appreciated  - CXR with old rib fracture and small RUL infiltrate  - +UA for Ecoli, continue IV aztreonam. follow urine culture. appreciated and agree with ID reccs regarding remote PCN allergy and should be able to tolerate cephalosporins  - lactic acidosis resolved.    #Traumatic T11-12 vertebral fracture s/p corpectomy and fusion surgery   - S/P T9-L3 open fusion with PSO/lag screw partial corpectomy/interbody with complex plastic closure (w/ Dr. Stroud and Dr. Tinajero on 9/29)   - TLSO when OOB  - Pain management: Tylenol 975mg ATC, Duloxetine 20mg daily, Lyrica 100mg TID , Flexeril 5mg TID PRN  - pt/ot/dvt ppx/ pain meds    # PAF, resolved  #HTN  #Orthostatic hypotension, resolved  - TTE 61%  - S/P 1L IVF bolus (10/13) for symptomatic hypotension  - patient converted back to SR. if AF is persistent will need AC  - HOLDING Losartan 100 mg daily and Chlorthalidone 25 mg daily (restart if SBP is consistently above 140 per cardio)  - Continue Metoprolol to 12.5 BID  - Continue Midodrine 7.5 mg AM (wean off as tolerated)     #Seasonal allergies/Nasal congestion  - Singulair     #HLD  - Atorvastatin     #Hyponatremia (resolved)  - Likely 2/2 SIADH in setting of pain and poor solute intake    #Post op Anemia , stable  - S/p PRBCs intraoperatively   - Hb stable with no evidence of active bleed at this time.   - Monitor H/H    #SILVANA (obstructive sleep apnea).   - Transient desaturations on RA    - c/w Nocturnal 2L O2 and PRN during day during naps, monitor sats   - F/u with PCP outpatient as may benefit from sleep study and CPAP.    #Liver cyst  -10/10 CT Abd/pelvis- There is a large cyst, stable since prior exam. There are small hypodense foci on segment II and segment I too small to characterize, unchanged since prior.  -Outpatient f/u     #Restless Leg Syndrome  -Pramipexole     #Elevated alkaline phosphatase level   - monitor     #Depression  -Duloxetine     # Gastritis-  Pantoprazole    #Urinary retention, resolved  - Flomax     #DVT ppx: Lovenox     will follow    d/w rehab team, pending [facility] transfer.  discussed above with patient.

## 2024-10-22 NOTE — PROGRESS NOTE ADULT - SUBJECTIVE AND OBJECTIVE BOX
HPI:  Mrs. Haley French is a 74-year-old female patient with past medical history of OA, HTN, gastroparesis, peripheral neuropathy, multiple prior thoracolumbar spine surgeries for congenital scoliosis done >[place] of [place] w/ removal of hardware (1991) who presented to [facility] on 9/28/24 with back pain and left hip pain s/p mechanical fall. CT imaging was performed and reported a T12 three column spine fracture-malalignment with fracture extending to the adjacent right 12th posterior rib and left T12-L1 facet joint and acute, displaced fracture of the left eighth lateral rib. Chronic appearing bilateral rib deformities. Surgical management was recommended and she is S/P T9-L3 open fusion with PSO/lag screw partial corpectomy/interbody by Dr. Stroud, Neurosurgeon with complex Plastic Surgery closure by Dr. Elias on 9/29/24. Post op course c/b CSK leak s/p repair, new onset paroxsymal Afib with RVR,  increased pain and drainage from surgical incision, development of pleural effusions, symptomatic orthostatic hypotension, urinary retention, post op anemia, and chest pain with spontaneous resolution. Cardiac work up negative. Patient was evaluated by PM&R and therapy for functional deficits, gait/ADL impairments and acute rehabilitation was recommended. Patient was cleared for discharge to [facility]F on 10/16/24. (17 Oct 2024 10:26)    11/7/24 to Home  ___________________________________________________________________________    SUBJECTIVE/ROS  Patient was seen and evaluated at bedside today.  Still waiting for bed assignment in transferring hospital.  CT findings as below. ID following.  Per recommendations by Hospitalist, will proceed with acute transfer to [facility] for further infectious work-up.  Her work up requires TTE and MICHAEL due to gram positive bacteremia to rule-out a valvular etiology.  ___________________________________________________________________________    IMAGING (per official Radiology reports)    CT LUMBAR SPINE & THORACIC SPINE   PROCEDURE DATE:  10/21/2024    IMPRESSION: Unremarkable CT scan of the thoracic spine. No vertebral fracture is recognized.    US DPLX LWR EXT VEINS COMPL BI  PROCEDURE DATE:  10/20/2024    INTERPRETATION:  CLINICAL INFORMATION: Leg pain.    IMPRESSION: No evidence of deep venous thrombosis in either lower extremity.    XR CHEST AP OR PA 1V  PROCEDURE DATE:  10/20/2024    IMPRESSION: Small right upper lobe infiltrate at this time.    CT BRAIN  PROCEDURE DATE:  10/20/2024    IMPRESSION: No evidence of acute intracranial pathology. If clinical symptoms persist or worsen, more sensitive evaluation with brain MRI may be obtained, if no contraindications exist.    ___________________________________________________________________________    Vital Signs Last 24 Hrs  T(C): 37.7 (22 Oct 2024 09:43), Max: 37.9 (22 Oct 2024 03:30)  T(F): 99.8 (22 Oct 2024 09:43), Max: 100.2 (22 Oct 2024 03:30)  HR: 98 (22 Oct 2024 09:43) (96 - 102)  BP: 101/64 (22 Oct 2024 09:43) (101/64 - 145/74)  RR: 14 (22 Oct 2024 09:43) (14 - 16)  SpO2: 98% (22 Oct 2024 09:43) (94% - 98%)    ___________________________________________________________________________    LAB                        8.6    10.50 )-----------( 377      ( 21 Oct 2024 06:20 )             26.8                             9.7    5.87  )-----------( 489      ( 18 Oct 2024 09:12 )             30.7         10-21    137  |  103  |  13  ----------------------------<  118[H]  3.0[L]   |  23  |  0.79    Ca    7.6[L]      21 Oct 2024 06:20    TPro  5.5[L]  /  Alb  2.4[L]  /  TBili  0.8  /  DBili  x   /  AST  23  /  ALT  33  /  AlkPhos  172[H]  10-21    LIVER FUNCTIONS - ( 21 Oct 2024 06:20 )  Alb: 2.4 g/dL / Pro: 5.5 g/dL / ALK PHOS: 172 U/L / ALT: 33 U/L / AST: 23 U/L / GGT: x           PT/INR - ( 20 Oct 2024 13:14 )   PT: 12.0 sec;   INR: 1.02 ratio    PTT - ( 20 Oct 2024 13:14 )  PTT:29.2 sec      Urinalysis Basic - ( 21 Oct 2024 06:20 )    Color: x / Appearance: x / SG: x / pH: x  Gluc: 118 mg/dL / Ketone: x  / Bili: x / Urobili: x   Blood: x / Protein: x / Nitrite: x   Leuk Esterase: x / RBC: x / WBC x   Sq Epi: x / Non Sq Epi: x / Bacteria: x    LAB                        8.6    10.50 )-----------( 377      ( 21 Oct 2024 06:20 )             26.8     10-21    137  |  103  |  13  ----------------------------<  118[H]  3.0[L]   |  23  |  0.79    Ca    7.6[L]      21 Oct 2024 06:20    TPro  5.5[L]  /  Alb  2.4[L]  /  TBili  0.8  /  DBili  x   /  AST  23  /  ALT  33  /  AlkPhos  172[H]  10-21    LIVER FUNCTIONS - ( 21 Oct 2024 06:20 )  Alb: 2.4 g/dL / Pro: 5.5 g/dL / ALK PHOS: 172 U/L / ALT: 33 U/L / AST: 23 U/L / GGT: x           Culture - Urine (collected 20 Oct 2024 17:15)  Source: Clean Catch Clean Catch (Midstream)  Preliminary Report (21 Oct 2024 16:31):    >100,000 CFU/ml Escherichia coli    Culture - Blood (collected 20 Oct 2024 13:14)  Source: .Blood BLOOD  Gram Stain (21 Oct 2024 11:04):    Growth in aerobic and anaerobic bottles: Gram Positive Cocci in Clusters  Preliminary Report (21 Oct 2024 23:27):    Growth in aerobic and anaerobic bottles: Staphylococcus aureus    See previous culture [id number]    Culture - Blood (collected 20 Oct 2024 13:07)  Source: .Blood BLOOD  Gram Stain (21 Oct 2024 15:45):    Growth in aerobic bottle: Gram Positive Cocci in Clusters    Growth in anaerobic bottle: Gram Positive Cocci in Clusters  Preliminary Report (22 Oct 2024 08:27):    Growth in aerobic and anaerobic bottles: Staphylococcus aureus    Direct identification is available within approximately 3-5    hours either by Blood Panel Multiplexed PCR or Direct    MALDI-TOF. Details: [email or web address]  Organism: Blood Culture PCR (21 Oct 2024 09:33)  Organism: Blood Culture PCR (21 Oct 2024 09:33)    COVID-19 PCR . (10.21.24 @ 09:20)   COVID-19 PCR: NotDetec    C-Reactive Protein (10.21.24 @ 06:20)   C-Reactive Protein: 183 mg/L    Sedimentation Rate, Erythrocyte (10.21.24 @ 06:20)   Sedimentation Rate, Erythrocyte: 61 mm/hr    ___________________________________________________________________________    MEDICATIONS  (STANDING):  acetaminophen     Tablet .. 975 milliGRAM(s) Oral every 8 hours  atorvastatin 40 milliGRAM(s) Oral at bedtime  aztreonam  IVPB 1000 milliGRAM(s) IV Intermittent every 8 hours  aztreonam  IVPB      calamine/zinc oxide Lotion 1 Application(s) Topical daily  DULoxetine 20 milliGRAM(s) Oral daily  enoxaparin Injectable 40 milliGRAM(s) SubCutaneous <User Schedule>  ergocalciferol 00953 Unit(s) Oral every week  erythromycin    ethylsuccinate Suspension 40 mG/mL 128 milliGRAM(s) Oral every 12 hours  lactated ringers Bolus 500 milliLiter(s) IV Bolus once  melatonin 6 milliGRAM(s) Oral at bedtime  metoprolol tartrate 12.5 milliGRAM(s) Oral every 12 hours  midodrine. 7.5 milliGRAM(s) Oral <User Schedule>  montelukast 10 milliGRAM(s) Oral daily  multivitamin 1 Tablet(s) Oral daily  polyethylene glycol 3350 17 Gram(s) Oral two times a day  pramipexole 3.75 milliGRAM(s) Oral at bedtime  pramipexole 1 milliGRAM(s) Oral <User Schedule>  pregabalin 100 milliGRAM(s) Oral three times a day  senna 2 Tablet(s) Oral at bedtime  sodium chloride 0.9%. 1000 milliLiter(s) (75 mL/Hr) IV Continuous <Continuous>  tamsulosin 0.4 milliGRAM(s) Oral at bedtime  vancomycin  IVPB 1250 milliGRAM(s) IV Intermittent every 12 hours  Vonoprazan (Voquezna) 10mg tablet 1 Tablet(s) 1 Tablet(s) Oral daily    MEDICATIONS  (PRN):  cyclobenzaprine 5 milliGRAM(s) Oral three times a day PRN Muscle Spasm  diphenhydrAMINE 25 milliGRAM(s) Oral every 8 hours PRN Rash and/or Itching  morphine   Solution 5 milliGRAM(s) Oral every 4 hours PRN Severe Pain (7 - 10)  sodium chloride 0.65% Nasal 1 Spray(s) Both Nostrils two times a day PRN Nasal Congestion    ___________________________________________________________________________    PHYSICAL EXAM:    Physical Exam: Gen - NAD, Comfortable  HEENT - NCAT, EOMI, MMM  Pulm - CTAB, No wheeze, No rhonchi, No crackles  Cardiovascular - RRR  Abdomen - Soft, mobile, firm mass in RLQ, mild TTP. +BS throughout all quadrants  Extremities - No C/C/E. No calf tenderness bl.  Neuro - stable  Psychiatric - Mood stable, Affect WNL  Skin:  24 cm incision along lower midline of back with small area of mild dehiscence mid incision. No drainage.     ___________________________________________________________________________

## 2024-10-22 NOTE — PROGRESS NOTE ADULT - SUBJECTIVE AND OBJECTIVE BOX
doing well overall  still with back pain      PHYSICAL EXAM:    Vital Signs Last 24 Hrs  T(F): 99.8 (22 Oct 2024 09:43), Max: 100.2 (22 Oct 2024 03:30)  HR: 98 (22 Oct 2024 09:43) (96 - 102)  BP: 101/64 (22 Oct 2024 09:43) (101/64 - 145/74)  RR: 14 (22 Oct 2024 09:43) (14 - 16)  SpO2: 98% (22 Oct 2024 09:43) (94% - 98%)  I&O's Summary      GENERAL: NAD  HEENT: NCAT  CHEST/LUNG: No increased WOB, Clear to percussion bilaterally; No rales, rhonchi, wheezing  HEART: Regular rate and rhythm; No murmurs  ABDOMEN: Soft, Nontender, Nondistended; Bowel sounds present  MUSCULOSKELETAL/EXTREMITIES:  2+ Peripheral Pulses, No LE edema  PSYCH: Appropriate affect, Alert & Oriented    LABS:                        8.6    10.50 )-----------( 377      ( 21 Oct 2024 06:20 )             26.8       10-21    137  |  103  |  13  ----------------------------<  118  3.0   |  23  |  0.79    Ca    7.6      21 Oct 2024 06:20    TPro  5.5  /  Alb  2.4  /  TBili  0.8  /  DBili  x   /  AST  23  /  ALT  33  /  AlkPhos  172  10-21       PT/INR - ( 20 Oct 2024 13:14 )   PT: 12.0 sec;   INR: 1.02 ratio         PTT - ( 20 Oct 2024 13:14 )  PTT:29.2 sec   Lactate, Blood: 1.4 mmol/L (10-21 @ 06:20)  Lactate, Blood: 1.7 mmol/L (10-20 @ 22:00)  Lactate, Blood: 2.9 mmol/L (10-20 @ 17:08)  Lactate, Blood: 2.6 mmol/L (10-20 @ 13:14)                          Urinalysis Basic - ( 21 Oct 2024 06:20 )    Color: x / Appearance: x / SG: x / pH: x  Gluc: 118 mg/dL / Ketone: x  / Bili: x / Urobili: x   Blood: x / Protein: x / Nitrite: x   Leuk Esterase: x / RBC: x / WBC x   Sq Epi: x / Non Sq Epi: x / Bacteria: x        Culture - Urine (collected 20 Oct 2024 17:15)  Source: Clean Catch Clean Catch (Midstream)  Preliminary Report (21 Oct 2024 16:31):    >100,000 CFU/ml Escherichia coli    Culture - Blood (collected 20 Oct 2024 13:14)  Source: .Blood BLOOD  Gram Stain (21 Oct 2024 11:04):    Growth in aerobic and anaerobic bottles: Gram Positive Cocci in Clusters  Preliminary Report (21 Oct 2024 23:27):    Growth in aerobic and anaerobic bottles: Staphylococcus aureus    See previous culture [id number]    Culture - Blood (collected 20 Oct 2024 13:07)  Source: .Blood BLOOD  Gram Stain (21 Oct 2024 15:45):    Growth in aerobic bottle: Gram Positive Cocci in Clusters    Growth in anaerobic bottle: Gram Positive Cocci in Clusters  Preliminary Report (22 Oct 2024 08:27):    Growth in aerobic and anaerobic bottles: Staphylococcus aureus    Direct identification is available within approximately 3-5    hours either by Blood Panel Multiplexed PCR or Direct    MALDI-TOF. Details: [email or web address]  Organism: Blood Culture PCR (21 Oct 2024 09:33)  Organism: Blood Culture PCR (21 Oct 2024 09:33)      Method Type: PCR      -  Methicillin SENSITIVE Staphylococcus aureus (MSSA): Detec Any isolate of Staphylococcus aureus from a blood culture is NOT considered a contaminant.      COVID-19 PCR: NotDetec (10-21-24 @ 09:20)  COVID-19 PCR: NotDetec (10-17-24 @ 16:34)      MEDICATIONS  (STANDING):  acetaminophen     Tablet .. 975 milliGRAM(s) Oral every 8 hours  AQUAPHOR (petrolatum Ointment) 1 Application(s) Topical four times a day  atorvastatin 40 milliGRAM(s) Oral at bedtime  aztreonam  IVPB 1000 milliGRAM(s) IV Intermittent every 8 hours  aztreonam  IVPB      calamine/zinc oxide Lotion 1 Application(s) Topical daily  DULoxetine 20 milliGRAM(s) Oral daily  enoxaparin Injectable 40 milliGRAM(s) SubCutaneous <User Schedule>  ergocalciferol 88112 Unit(s) Oral every week  erythromycin    ethylsuccinate Suspension 40 mG/mL 128 milliGRAM(s) Oral every 12 hours  lactated ringers Bolus 500 milliLiter(s) IV Bolus once  melatonin 6 milliGRAM(s) Oral at bedtime  metoprolol tartrate 12.5 milliGRAM(s) Oral every 12 hours  midodrine. 7.5 milliGRAM(s) Oral <User Schedule>  montelukast 10 milliGRAM(s) Oral daily  multivitamin 1 Tablet(s) Oral daily  polyethylene glycol 3350 17 Gram(s) Oral two times a day  pramipexole 3.75 milliGRAM(s) Oral at bedtime  pramipexole 1 milliGRAM(s) Oral <User Schedule>  pregabalin 100 milliGRAM(s) Oral three times a day  senna 2 Tablet(s) Oral at bedtime  sodium chloride 0.9%. 1000 milliLiter(s) (75 mL/Hr) IV Continuous <Continuous>  tamsulosin 0.4 milliGRAM(s) Oral at bedtime  vancomycin  IVPB 1250 milliGRAM(s) IV Intermittent every 12 hours  Vonoprazan (Voquezna) 10mg tablet 1 Tablet(s) 1 Tablet(s) Oral daily    MEDICATIONS  (PRN):  cyclobenzaprine 5 milliGRAM(s) Oral three times a day PRN Muscle Spasm  diphenhydrAMINE 25 milliGRAM(s) Oral every 8 hours PRN Rash and/or Itching  morphine   Solution 5 milliGRAM(s) Oral every 4 hours PRN Severe Pain (7 - 10)  sodium chloride 0.65% Nasal 1 Spray(s) Both Nostrils two times a day PRN Nasal Congestion      Care Discussed with Consultants/Other Providers: Yes

## 2024-10-23 ENCOUNTER — INPATIENT (INPATIENT)
Facility: HOSPITAL | Age: 74
LOS: 6 days | Discharge: NURSING, REHAB OR LONG-TERM CARE | DRG: 872 | End: 2024-10-30
Attending: INTERNAL MEDICINE | Admitting: STUDENT IN AN ORGANIZED HEALTH CARE EDUCATION/TRAINING PROGRAM
Payer: MEDICARE

## 2024-10-23 ENCOUNTER — RESULT REVIEW (OUTPATIENT)
Age: 74
End: 2024-10-23

## 2024-10-23 VITALS
WEIGHT: 195.11 LBS | HEART RATE: 103 BPM | TEMPERATURE: 99 F | RESPIRATION RATE: 18 BRPM | OXYGEN SATURATION: 96 % | DIASTOLIC BLOOD PRESSURE: 61 MMHG | HEIGHT: 62 IN | SYSTOLIC BLOOD PRESSURE: 120 MMHG

## 2024-10-23 DIAGNOSIS — K29.70 GASTRITIS, UNSPECIFIED, WITHOUT BLEEDING: ICD-10-CM

## 2024-10-23 DIAGNOSIS — Z98.49 CATARACT EXTRACTION STATUS, UNSPECIFIED EYE: Chronic | ICD-10-CM

## 2024-10-23 DIAGNOSIS — R74.8 ABNORMAL LEVELS OF OTHER SERUM ENZYMES: ICD-10-CM

## 2024-10-23 DIAGNOSIS — M54.9 DORSALGIA, UNSPECIFIED: ICD-10-CM

## 2024-10-23 DIAGNOSIS — F32.9 MAJOR DEPRESSIVE DISORDER, SINGLE EPISODE, UNSPECIFIED: ICD-10-CM

## 2024-10-23 DIAGNOSIS — A41.01 SEPSIS DUE TO METHICILLIN SUSCEPTIBLE STAPHYLOCOCCUS AUREUS: ICD-10-CM

## 2024-10-23 DIAGNOSIS — G25.81 RESTLESS LEGS SYNDROME: ICD-10-CM

## 2024-10-23 DIAGNOSIS — Z98.890 OTHER SPECIFIED POSTPROCEDURAL STATES: Chronic | ICD-10-CM

## 2024-10-23 DIAGNOSIS — D64.9 ANEMIA, UNSPECIFIED: ICD-10-CM

## 2024-10-23 DIAGNOSIS — A41.9 SEPSIS, UNSPECIFIED ORGANISM: ICD-10-CM

## 2024-10-23 DIAGNOSIS — Z96.643 PRESENCE OF ARTIFICIAL HIP JOINT, BILATERAL: Chronic | ICD-10-CM

## 2024-10-23 DIAGNOSIS — S22.080A WEDGE COMPRESSION FRACTURE OF T11-T12 VERTEBRA, INITIAL ENCOUNTER FOR CLOSED FRACTURE: ICD-10-CM

## 2024-10-23 DIAGNOSIS — G47.33 OBSTRUCTIVE SLEEP APNEA (ADULT) (PEDIATRIC): ICD-10-CM

## 2024-10-23 DIAGNOSIS — E78.5 HYPERLIPIDEMIA, UNSPECIFIED: ICD-10-CM

## 2024-10-23 DIAGNOSIS — K76.89 OTHER SPECIFIED DISEASES OF LIVER: ICD-10-CM

## 2024-10-23 DIAGNOSIS — M54.50 LOW BACK PAIN, UNSPECIFIED: ICD-10-CM

## 2024-10-23 DIAGNOSIS — J30.2 OTHER SEASONAL ALLERGIC RHINITIS: ICD-10-CM

## 2024-10-23 DIAGNOSIS — I10 ESSENTIAL (PRIMARY) HYPERTENSION: ICD-10-CM

## 2024-10-23 DIAGNOSIS — Z29.9 ENCOUNTER FOR PROPHYLACTIC MEASURES, UNSPECIFIED: ICD-10-CM

## 2024-10-23 DIAGNOSIS — I48.0 PAROXYSMAL ATRIAL FIBRILLATION: ICD-10-CM

## 2024-10-23 LAB
-  AMOXICILLIN/CLAVULANIC ACID: SIGNIFICANT CHANGE UP
-  AMPICILLIN/SULBACTAM: SIGNIFICANT CHANGE UP
-  AMPICILLIN: SIGNIFICANT CHANGE UP
-  AZTREONAM: SIGNIFICANT CHANGE UP
-  CEFAZOLIN: SIGNIFICANT CHANGE UP
-  CEFEPIME: SIGNIFICANT CHANGE UP
-  CEFOXITIN: SIGNIFICANT CHANGE UP
-  CEFTRIAXONE: SIGNIFICANT CHANGE UP
-  CEFUROXIME: SIGNIFICANT CHANGE UP
-  CIPROFLOXACIN: SIGNIFICANT CHANGE UP
-  ERTAPENEM: SIGNIFICANT CHANGE UP
-  GENTAMICIN: SIGNIFICANT CHANGE UP
-  IMIPENEM: SIGNIFICANT CHANGE UP
-  LEVOFLOXACIN: SIGNIFICANT CHANGE UP
-  MEROPENEM: SIGNIFICANT CHANGE UP
-  NITROFURANTOIN: SIGNIFICANT CHANGE UP
-  PIPERACILLIN/TAZOBACTAM: SIGNIFICANT CHANGE UP
-  TOBRAMYCIN: SIGNIFICANT CHANGE UP
-  TRIMETHOPRIM/SULFAMETHOXAZOLE: SIGNIFICANT CHANGE UP
ALBUMIN SERPL ELPH-MCNC: 3 G/DL — LOW (ref 3.3–5)
ALP SERPL-CCNC: 233 U/L — HIGH (ref 40–120)
ALT FLD-CCNC: 43 U/L — SIGNIFICANT CHANGE UP (ref 10–45)
ANION GAP SERPL CALC-SCNC: 13 MMOL/L — SIGNIFICANT CHANGE UP (ref 5–17)
APTT BLD: 30.7 SEC — SIGNIFICANT CHANGE UP (ref 24.5–35.6)
APTT BLD: 62.3 SEC — HIGH (ref 24.5–35.6)
AST SERPL-CCNC: 42 U/L — HIGH (ref 10–40)
BASOPHILS # BLD AUTO: 0.05 K/UL — SIGNIFICANT CHANGE UP (ref 0–0.2)
BASOPHILS NFR BLD AUTO: 0.6 % — SIGNIFICANT CHANGE UP (ref 0–2)
BILIRUB SERPL-MCNC: 0.6 MG/DL — SIGNIFICANT CHANGE UP (ref 0.2–1.2)
BLD GP AB SCN SERPL QL: NEGATIVE — SIGNIFICANT CHANGE UP
BUN SERPL-MCNC: 15 MG/DL — SIGNIFICANT CHANGE UP (ref 7–23)
CALCIUM SERPL-MCNC: 8 MG/DL — LOW (ref 8.4–10.5)
CHLORIDE SERPL-SCNC: 100 MMOL/L — SIGNIFICANT CHANGE UP (ref 96–108)
CO2 SERPL-SCNC: 21 MMOL/L — LOW (ref 22–31)
CREAT SERPL-MCNC: 0.67 MG/DL — SIGNIFICANT CHANGE UP (ref 0.5–1.3)
EGFR: 91 ML/MIN/1.73M2 — SIGNIFICANT CHANGE UP
EOSINOPHIL # BLD AUTO: 0.15 K/UL — SIGNIFICANT CHANGE UP (ref 0–0.5)
EOSINOPHIL NFR BLD AUTO: 1.8 % — SIGNIFICANT CHANGE UP (ref 0–6)
GLUCOSE SERPL-MCNC: 103 MG/DL — HIGH (ref 70–99)
HCT VFR BLD CALC: 27.5 % — LOW (ref 34.5–45)
HCT VFR BLD CALC: 29.7 % — LOW (ref 34.5–45)
HGB BLD-MCNC: 8.4 G/DL — LOW (ref 11.5–15.5)
HGB BLD-MCNC: 8.8 G/DL — LOW (ref 11.5–15.5)
IMM GRANULOCYTES NFR BLD AUTO: 0.4 % — SIGNIFICANT CHANGE UP (ref 0–0.9)
LACTATE SERPL-SCNC: 1.6 MMOL/L — SIGNIFICANT CHANGE UP (ref 0.5–2)
LYMPHOCYTES # BLD AUTO: 1.21 K/UL — SIGNIFICANT CHANGE UP (ref 1–3.3)
LYMPHOCYTES # BLD AUTO: 14.5 % — SIGNIFICANT CHANGE UP (ref 13–44)
MAGNESIUM SERPL-MCNC: 2 MG/DL — SIGNIFICANT CHANGE UP (ref 1.6–2.6)
MCHC RBC-ENTMCNC: 27.3 PG — SIGNIFICANT CHANGE UP (ref 27–34)
MCHC RBC-ENTMCNC: 27.4 PG — SIGNIFICANT CHANGE UP (ref 27–34)
MCHC RBC-ENTMCNC: 29.6 GM/DL — LOW (ref 32–36)
MCHC RBC-ENTMCNC: 30.5 GM/DL — LOW (ref 32–36)
MCV RBC AUTO: 89.3 FL — SIGNIFICANT CHANGE UP (ref 80–100)
MCV RBC AUTO: 92.5 FL — SIGNIFICANT CHANGE UP (ref 80–100)
METHOD TYPE: SIGNIFICANT CHANGE UP
MONOCYTES # BLD AUTO: 0.52 K/UL — SIGNIFICANT CHANGE UP (ref 0–0.9)
MONOCYTES NFR BLD AUTO: 6.2 % — SIGNIFICANT CHANGE UP (ref 2–14)
NEUTROPHILS # BLD AUTO: 6.4 K/UL — SIGNIFICANT CHANGE UP (ref 1.8–7.4)
NEUTROPHILS NFR BLD AUTO: 76.5 % — SIGNIFICANT CHANGE UP (ref 43–77)
NRBC # BLD: 0 /100 WBCS — SIGNIFICANT CHANGE UP (ref 0–0)
NRBC # BLD: 0 /100 WBCS — SIGNIFICANT CHANGE UP (ref 0–0)
PHOSPHATE SERPL-MCNC: 1.5 MG/DL — LOW (ref 2.5–4.5)
PLATELET # BLD AUTO: 349 K/UL — SIGNIFICANT CHANGE UP (ref 150–400)
PLATELET # BLD AUTO: 378 K/UL — SIGNIFICANT CHANGE UP (ref 150–400)
POTASSIUM SERPL-MCNC: 3.3 MMOL/L — LOW (ref 3.5–5.3)
POTASSIUM SERPL-SCNC: 3.3 MMOL/L — LOW (ref 3.5–5.3)
PROT SERPL-MCNC: 6.3 G/DL — SIGNIFICANT CHANGE UP (ref 6–8.3)
RBC # BLD: 3.08 M/UL — LOW (ref 3.8–5.2)
RBC # BLD: 3.21 M/UL — LOW (ref 3.8–5.2)
RBC # FLD: 15.5 % — HIGH (ref 10.3–14.5)
RBC # FLD: 15.8 % — HIGH (ref 10.3–14.5)
RH IG SCN BLD-IMP: NEGATIVE — SIGNIFICANT CHANGE UP
SODIUM SERPL-SCNC: 134 MMOL/L — LOW (ref 135–145)
TSH SERPL-MCNC: 1.08 UIU/ML — SIGNIFICANT CHANGE UP (ref 0.27–4.2)
WBC # BLD: 8.36 K/UL — SIGNIFICANT CHANGE UP (ref 3.8–10.5)
WBC # BLD: 8.52 K/UL — SIGNIFICANT CHANGE UP (ref 3.8–10.5)
WBC # FLD AUTO: 8.36 K/UL — SIGNIFICANT CHANGE UP (ref 3.8–10.5)
WBC # FLD AUTO: 8.52 K/UL — SIGNIFICANT CHANGE UP (ref 3.8–10.5)

## 2024-10-23 PROCEDURE — 72158 MRI LUMBAR SPINE W/O & W/DYE: CPT | Mod: 26

## 2024-10-23 PROCEDURE — 93321 DOPPLER ECHO F-UP/LMTD STD: CPT | Mod: 26

## 2024-10-23 PROCEDURE — 99239 HOSP IP/OBS DSCHRG MGMT >30: CPT

## 2024-10-23 PROCEDURE — 93308 TTE F-UP OR LMTD: CPT | Mod: 26

## 2024-10-23 PROCEDURE — 93010 ELECTROCARDIOGRAM REPORT: CPT | Mod: 76

## 2024-10-23 PROCEDURE — 99223 1ST HOSP IP/OBS HIGH 75: CPT | Mod: GC

## 2024-10-23 PROCEDURE — 99223 1ST HOSP IP/OBS HIGH 75: CPT | Mod: GC,AI

## 2024-10-23 PROCEDURE — 72157 MRI CHEST SPINE W/O & W/DYE: CPT | Mod: 26

## 2024-10-23 PROCEDURE — 93325 DOPPLER ECHO COLOR FLOW MAPG: CPT | Mod: 26

## 2024-10-23 RX ORDER — CEFEPIME 2 G/1
1000 INJECTION, POWDER, FOR SOLUTION INTRAVENOUS ONCE
Refills: 0 | Status: COMPLETED | OUTPATIENT
Start: 2024-10-23 | End: 2024-10-23

## 2024-10-23 RX ORDER — METOPROLOL TARTRATE 50 MG
12.5 TABLET ORAL EVERY 12 HOURS
Refills: 0 | Status: DISCONTINUED | OUTPATIENT
Start: 2024-10-23 | End: 2024-10-25

## 2024-10-23 RX ORDER — PRAMIPEXOLE DIHYDROCHLORIDE 0.12 MG/1
0.75 TABLET ORAL AT BEDTIME
Refills: 0 | Status: DISCONTINUED | OUTPATIENT
Start: 2024-10-23 | End: 2024-10-24

## 2024-10-23 RX ORDER — POLYETHYLENE GLYCOL 3350 17 G/17G
17 POWDER, FOR SOLUTION ORAL
Refills: 0 | Status: DISCONTINUED | OUTPATIENT
Start: 2024-10-23 | End: 2024-10-25

## 2024-10-23 RX ORDER — PREGABALIN 150 MG/1
100 CAPSULE ORAL EVERY 8 HOURS
Refills: 0 | Status: DISCONTINUED | OUTPATIENT
Start: 2024-10-23 | End: 2024-10-25

## 2024-10-23 RX ORDER — CEFEPIME 2 G/1
INJECTION, POWDER, FOR SOLUTION INTRAVENOUS
Refills: 0 | Status: DISCONTINUED | OUTPATIENT
Start: 2024-10-23 | End: 2024-10-23

## 2024-10-23 RX ORDER — HYDROMORPHONE HCL/0.9% NACL/PF 6 MG/30 ML
1 PATIENT CONTROLLED ANALGESIA SYRINGE INTRAVENOUS ONCE
Refills: 0 | Status: DISCONTINUED | OUTPATIENT
Start: 2024-10-23 | End: 2024-10-23

## 2024-10-23 RX ORDER — CEFAZOLIN SODIUM 1 G
2000 VIAL (EA) INJECTION EVERY 8 HOURS
Refills: 0 | Status: DISCONTINUED | OUTPATIENT
Start: 2024-10-23 | End: 2024-10-25

## 2024-10-23 RX ORDER — HEPARIN SODIUM 10000 [USP'U]/ML
INJECTION INTRAVENOUS; SUBCUTANEOUS
Qty: 25000 | Refills: 0 | Status: DISCONTINUED | OUTPATIENT
Start: 2024-10-23 | End: 2024-10-23

## 2024-10-23 RX ORDER — ERGOCALCIFEROL 1.25 MG/1
1 CAPSULE ORAL
Refills: 0 | DISCHARGE

## 2024-10-23 RX ORDER — ENOXAPARIN SODIUM 40MG/0.4ML
40 SYRINGE (ML) SUBCUTANEOUS EVERY 24 HOURS
Refills: 0 | Status: DISCONTINUED | OUTPATIENT
Start: 2024-10-23 | End: 2024-10-23

## 2024-10-23 RX ORDER — ERGOCALCIFEROL (VITAMIN D2) 200 MCG/ML
50000 DROPS ORAL
Refills: 0 | Status: DISCONTINUED | OUTPATIENT
Start: 2024-10-23 | End: 2024-10-25

## 2024-10-23 RX ORDER — MONTELUKAST SODIUM 10 MG/1
10 TABLET, FILM COATED ORAL DAILY
Refills: 0 | Status: DISCONTINUED | OUTPATIENT
Start: 2024-10-23 | End: 2024-10-25

## 2024-10-23 RX ORDER — CYCLOBENZAPRINE HYDROCHLORIDE 30 MG/1
5 CAPSULE, EXTENDED RELEASE ORAL THREE TIMES A DAY
Refills: 0 | Status: DISCONTINUED | OUTPATIENT
Start: 2024-10-23 | End: 2024-10-25

## 2024-10-23 RX ORDER — PRAMIPEXOLE DIHYDROCHLORIDE 0.12 MG/1
0.5 TABLET ORAL AT BEDTIME
Refills: 0 | Status: DISCONTINUED | OUTPATIENT
Start: 2024-10-23 | End: 2024-10-23

## 2024-10-23 RX ORDER — HYDROMORPHONE HCL/0.9% NACL/PF 6 MG/30 ML
2 PATIENT CONTROLLED ANALGESIA SYRINGE INTRAVENOUS EVERY 6 HOURS
Refills: 0 | Status: DISCONTINUED | OUTPATIENT
Start: 2024-10-23 | End: 2024-10-24

## 2024-10-23 RX ORDER — DULOXETINE HYDROCHLORIDE 30 MG/1
20 CAPSULE, DELAYED RELEASE ORAL DAILY
Refills: 0 | Status: DISCONTINUED | OUTPATIENT
Start: 2024-10-23 | End: 2024-10-25

## 2024-10-23 RX ORDER — POTASSIUM CHLORIDE 10 MEQ
40 TABLET, EXTENDED RELEASE ORAL ONCE
Refills: 0 | Status: COMPLETED | OUTPATIENT
Start: 2024-10-23 | End: 2024-10-23

## 2024-10-23 RX ORDER — HEPARIN SODIUM 10000 [USP'U]/ML
1600 INJECTION INTRAVENOUS; SUBCUTANEOUS
Qty: 25000 | Refills: 0 | Status: DISCONTINUED | OUTPATIENT
Start: 2024-10-23 | End: 2024-10-24

## 2024-10-23 RX ORDER — POTASSIUM PHOSPHATE 236; 224 MG/ML; MG/ML
30 INJECTION, SOLUTION INTRAVENOUS ONCE
Refills: 0 | Status: COMPLETED | OUTPATIENT
Start: 2024-10-23 | End: 2024-10-23

## 2024-10-23 RX ORDER — PANTOPRAZOLE SODIUM 40 MG/1
40 TABLET, DELAYED RELEASE ORAL
Refills: 0 | Status: DISCONTINUED | OUTPATIENT
Start: 2024-10-23 | End: 2024-10-24

## 2024-10-23 RX ORDER — HEPARIN SODIUM 10000 [USP'U]/ML
3500 INJECTION INTRAVENOUS; SUBCUTANEOUS EVERY 6 HOURS
Refills: 0 | Status: DISCONTINUED | OUTPATIENT
Start: 2024-10-23 | End: 2024-10-25

## 2024-10-23 RX ORDER — ERYTHROMYCIN STEARATE 250 MG
128 TABLET ORAL
Qty: 0 | Refills: 0 | DISCHARGE

## 2024-10-23 RX ORDER — ACETAMINOPHEN 500 MG
975 TABLET ORAL EVERY 8 HOURS
Refills: 0 | Status: DISCONTINUED | OUTPATIENT
Start: 2024-10-23 | End: 2024-10-25

## 2024-10-23 RX ORDER — CEFEPIME 2 G/1
1000 INJECTION, POWDER, FOR SOLUTION INTRAVENOUS EVERY 8 HOURS
Refills: 0 | Status: DISCONTINUED | OUTPATIENT
Start: 2024-10-23 | End: 2024-10-23

## 2024-10-23 RX ORDER — B-COMPLEX WITH VITAMIN C
1 VIAL (ML) INJECTION DAILY
Refills: 0 | Status: DISCONTINUED | OUTPATIENT
Start: 2024-10-23 | End: 2024-10-25

## 2024-10-23 RX ORDER — HEPARIN SODIUM 10000 [USP'U]/ML
7000 INJECTION INTRAVENOUS; SUBCUTANEOUS EVERY 6 HOURS
Refills: 0 | Status: DISCONTINUED | OUTPATIENT
Start: 2024-10-23 | End: 2024-10-25

## 2024-10-23 RX ORDER — HYDROMORPHONE HCL/0.9% NACL/PF 6 MG/30 ML
2 PATIENT CONTROLLED ANALGESIA SYRINGE INTRAVENOUS ONCE
Refills: 0 | Status: DISCONTINUED | OUTPATIENT
Start: 2024-10-23 | End: 2024-10-23

## 2024-10-23 RX ORDER — MELATONIN 5 MG
3 TABLET ORAL AT BEDTIME
Refills: 0 | Status: DISCONTINUED | OUTPATIENT
Start: 2024-10-23 | End: 2024-10-25

## 2024-10-23 RX ORDER — PRAMIPEXOLE DIHYDROCHLORIDE 0.12 MG/1
4 TABLET ORAL AT BEDTIME
Refills: 0 | Status: DISCONTINUED | OUTPATIENT
Start: 2024-10-23 | End: 2024-10-23

## 2024-10-23 RX ORDER — LORAZEPAM 2 MG
2 TABLET ORAL ONCE
Refills: 0 | Status: DISCONTINUED | OUTPATIENT
Start: 2024-10-23 | End: 2024-10-23

## 2024-10-23 RX ORDER — HEPARIN SODIUM 10000 [USP'U]/ML
7000 INJECTION INTRAVENOUS; SUBCUTANEOUS ONCE
Refills: 0 | Status: COMPLETED | OUTPATIENT
Start: 2024-10-23 | End: 2024-10-23

## 2024-10-23 RX ORDER — SENNA 187 MG
2 TABLET ORAL AT BEDTIME
Refills: 0 | Status: DISCONTINUED | OUTPATIENT
Start: 2024-10-23 | End: 2024-10-25

## 2024-10-23 RX ADMIN — Medication 40 MILLIGRAM(S): at 09:29

## 2024-10-23 RX ADMIN — HEPARIN SODIUM 1600 UNIT(S)/HR: 10000 INJECTION INTRAVENOUS; SUBCUTANEOUS at 19:34

## 2024-10-23 RX ADMIN — Medication 2 MILLIGRAM(S): at 18:36

## 2024-10-23 RX ADMIN — HEPARIN SODIUM 1600 UNIT(S)/HR: 10000 INJECTION INTRAVENOUS; SUBCUTANEOUS at 13:56

## 2024-10-23 RX ADMIN — CEFEPIME 100 MILLIGRAM(S): 2 INJECTION, POWDER, FOR SOLUTION INTRAVENOUS at 08:13

## 2024-10-23 RX ADMIN — CYCLOBENZAPRINE HYDROCHLORIDE 5 MILLIGRAM(S): 30 CAPSULE, EXTENDED RELEASE ORAL at 18:00

## 2024-10-23 RX ADMIN — Medication 2 MILLIGRAM(S): at 21:25

## 2024-10-23 RX ADMIN — Medication 975 MILLIGRAM(S): at 13:43

## 2024-10-23 RX ADMIN — Medication 2 MILLIGRAM(S): at 12:19

## 2024-10-23 RX ADMIN — POTASSIUM PHOSPHATE 83.33 MILLIMOLE(S): 236; 224 INJECTION, SOLUTION INTRAVENOUS at 14:51

## 2024-10-23 RX ADMIN — Medication 12.5 MILLIGRAM(S): at 09:29

## 2024-10-23 RX ADMIN — Medication 975 MILLIGRAM(S): at 13:13

## 2024-10-23 RX ADMIN — Medication 1 MILLIGRAM(S): at 21:26

## 2024-10-23 RX ADMIN — Medication 2 MILLIGRAM(S): at 19:06

## 2024-10-23 RX ADMIN — Medication 40 MILLIEQUIVALENT(S): at 13:12

## 2024-10-23 RX ADMIN — Medication 1 MILLIGRAM(S): at 21:59

## 2024-10-23 RX ADMIN — Medication 100 MILLIGRAM(S): at 14:19

## 2024-10-23 RX ADMIN — PREGABALIN 100 MILLIGRAM(S): 150 CAPSULE ORAL at 13:13

## 2024-10-23 RX ADMIN — CYCLOBENZAPRINE HYDROCHLORIDE 5 MILLIGRAM(S): 30 CAPSULE, EXTENDED RELEASE ORAL at 09:29

## 2024-10-23 RX ADMIN — HEPARIN SODIUM 7000 UNIT(S): 10000 INJECTION INTRAVENOUS; SUBCUTANEOUS at 13:13

## 2024-10-23 RX ADMIN — Medication 1 LOZENGE: at 01:04

## 2024-10-23 RX ADMIN — Medication 2 MILLIGRAM(S): at 11:49

## 2024-10-23 RX ADMIN — DULOXETINE HYDROCHLORIDE 20 MILLIGRAM(S): 30 CAPSULE, DELAYED RELEASE ORAL at 11:49

## 2024-10-23 NOTE — H&P ADULT - PROBLEM SELECTOR PLAN 6
1.81
- Transient desaturations on RA    - 10/23 94% on RA    Plan  - c/w Nocturnal 2L O2 and PRN during day during naps, monitor sats   - F/u with PCP outpatient as may benefit from sleep study and CPAP.

## 2024-10-23 NOTE — H&P ADULT - PROBLEM SELECTOR PLAN 1
Patient presents with sepsis with high fever, rigors, tachycardia on 10/20 found to have mssa bacteremia.  - 10/20 Bcx MSSA, 10/21 no growth at 24hrs  - CXR with old rib fracture and small RUL infiltrate  - +UA for Ecoli, follow urine culture  - lactic acidosis resolved.    Plan  - Repeat daily Bcx  - Switch cefopime --> cefazolin  - Consulted ID for MSSA bacteremia, f/u reccs  - Consulted neurosurg for pending spinal MRI, f/u reccs Patient presents with sepsis with high fever, rigors, tachycardia on 10/20 found to have mssa bacteremia.  - 10/20 Bcx MSSA, 10/21 no growth at 24hrs  - CXR with old rib fracture and small RUL infiltrate  - 10/20 +UA for Ecoli, follow urine culture  - lactic acidosis resolved    Plan  - Repeat daily Bcx  - Switch cefopime --> cefazolin 2000mg IV q8  - Consulted ID for MSSA bacteremia, f/u reccs  - Consulted neurosurg for pending spinal MRI, f/u reccs  - Will consult anesthesia when neurosurg clears for MRI Patient presents with sepsis with high fever, rigors, tachycardia on 10/20 found to have MSSA bacteremia.  - 10/20 Bcx MSSA, 10/21 no growth at 24hrs  - CXR with old rib fracture and small RUL infiltrate  - 10/20 +UA for Ecoli, follow urine culture  - lactic acidosis resolved  - Na 134 K 3.3 Phos 1.5    Plan  - Repeat daily Bcx  - Replete electrolytes  - Switch cefopime --> cefazolin 2000mg IV q8  - Consulted ID for MSSA bacteremia, f/u reccs  - Consulted neurosurg for pending spinal MRI, f/u reccs  - Will consult anesthesia when neurosurg clears for MRI Patient presents with sepsis with high fever, rigors, tachycardia on 10/20 found to have MSSA bacteremia.  - 10/20 Bcx MSSA, 10/21 no growth at 24hrs  - CXR with old rib fracture and small RUL infiltrate  - 10/20 +UA for Ecoli, follow urine culture  - lactic acidosis resolved, lactate 1.6 10/23  - Na 134 K 3.3 Phos 1.5    Plan  - Repeat daily Bcx  - Replete electrolytes  - Switch cefopime --> cefazolin 2000mg IV q8  - Consulted ID for MSSA bacteremia, f/u reccs  - Consulted neurosurg for pending spinal MRI, f/u reccs  - Will consult anesthesia when neurosurg clears for MRI Patient presents with sepsis with high fever, rigors, tachycardia on 10/20 found to have MSSA bacteremia.  - 10/20 Bcx MSSA, 10/21 no growth at 24hrs  - CXR with old rib fracture and small RUL infiltrate  - 10/20 +UA for Ecoli, follow urine culture  - lactic acidosis resolved, lactate 1.6 10/23  - Na 134 K 3.3 Phos 1.5    Plan  - Repeat daily Bcx  - Replete electrolytes  - Switch cefopime --> cefazolin 2000mg IV q8  - MICHAEL  - Consulted ID for MSSA bacteremia, f/u reccs  - Consulted neurosurg for pending spinal MRI, f/u reccs  - Will consult anesthesia when neurosurg clears for MRI

## 2024-10-23 NOTE — H&P ADULT - PROBLEM SELECTOR PLAN 10
Patient has PMH of gastritis...    Plan  - c/w pantoprazole 40mg PO - c/w pantoprazole 40mg PO - Hi serum alk phos levels 247 --> 228 --> 172 --> 233 (10/23)    Plan  - Monitor serum alk phos levels

## 2024-10-23 NOTE — H&P ADULT - PROBLEM SELECTOR PROBLEM 3
Chronic hypertension Traumatic compression fracture of T11 thoracic vertebra, closed, initial encounter Acute on chronic low back pain

## 2024-10-23 NOTE — H&P ADULT - PROBLEM SELECTOR PLAN 7
Patient has PMH of RLS    Plan  - c/w pramipexole 1mg PO qd and 0.75mg 5 tabs at bedtime - c/w pramipexole 1mg PO qd and 0.75mg 5 tabs at bedtime

## 2024-10-23 NOTE — H&P ADULT - PROBLEM SELECTOR PLAN 9
-10/10 CT Abd/pelvis- There is a large cyst, stable since prior exam. There are small hypodense foci on segment II and segment I too small to characterize, unchanged since prior.    Plan  -Outpatient f/u

## 2024-10-23 NOTE — H&P ADULT - NSHPLABSRESULTS_GEN_ALL_CORE
Lactate Trend  10-21 @ 06:20 Lactate:1.4   10-20 @ 22:00 Lactate:1.7   10-20 @ 17:08 Lactate:2.9   10-20 @ 13:14 Lactate:2.6             CAPILLARY BLOOD GLUCOSE            Culture Results:   No growth at 24 hours (10-21 @ 18:02)  Culture Results:   No growth at 24 hours (10-21 @ 17:58)  Culture Results:   >100,000 CFU/ml Escherichia coli (10-20 @ 17:15)  Culture Results:   Growth in aerobic and anaerobic bottles: Staphylococcus aureus  See previous culture [id number] (10-20 @ 13:14)  Culture Results:   Growth in aerobic and anaerobic bottles: Staphylococcus aureus  Direct identification is available within approximately 3-5  hours either by Blood Panel Multiplexed PCR or Direct  MALDI-TOF. Details: [email or web address] (10-20 @ 13:07)

## 2024-10-23 NOTE — H&P ADULT - NSICDXFAMILYHX_GEN_ALL_CORE_FT
FAMILY HISTORY:  Mother  Still living? Unknown  Family history of abdominal aortic aneurysm (AAA), Age at diagnosis: Age Unknown

## 2024-10-23 NOTE — CONSULT NOTE ADULT - SUBJECTIVE AND OBJECTIVE BOX
Haley French  HPI: [id number] patient of Dr. Stroud h/o OA, HTN, gastroparesis, peripheral neuropathy, multiple prior thoracolumbar spine surgeries for congenital scoliosis w/ removal of hardware 1991. Presented to [facility] on 9/28/24 w/ back & hip pain s/p mechanical fall found to have T12 three column fx, now s/p T9-L3 fxn w/ PSO/partial corpectomy/interbody fxn 9/29/24. D/c'd to [facility] 10/26, had RRT for AMS, fever 10/20, found to have MSSA bacteremia. CT T/L spine 10/21 w/ no e/o fluid collection, hardware intact. Tx to [facility] medicine service for MICHAEL, PICC line. Doing well, mild back pain but managing with meds. No bowel or bladder symptoms, no saddle anesthesia. Medicine starting hep gtt for afib, getting MR T/L spine. Exam: AOx3, BUE 5/5, BLE 5/5, incision c/d/i, mild bogginess posterior aspect of collection

## 2024-10-23 NOTE — PHYSICAL THERAPY INITIAL EVALUATION ADULT - NSPTDISCHREC_GEN_A_CORE
Return to [facility]; If pt d/c home, pt would require Home PT and assist/supervision with ALL functional mobility and ADLs./Acute Inpatient Rehab

## 2024-10-23 NOTE — CONSULT NOTE ADULT - ASSESSMENT
-no acute neurosurgical intervention  -f/u BCx, 10/21 NGTD   -heparin gtt ok for afib, recommend low PTT goal 50-70  -no objection to MR T/L spine w/w/o although diagnostic yield may be lower as <1 month postop  -will continue to follow pending imaging  -if no clear compressive fluid collection on MR ok to d/c to [facility] w/PICC for IV ABX
Patient is a 74-year-old female patient with past medical history of OA, HTN, gastroparesis, peripheral neuropathy, multiple prior thoracolumbar spine surgeries for congenital scoliosis [place] w/ removal of hardware (1991) who presented to [facility] on 9/28/24 with back pain and left hip pain s/p mechanical fall. CT imaging was performed and reported a T12 three column spine fracture-malalignment with fracture extending to the adjacent right 12th posterior rib and left T12-L1 facet joint and acute, displaced fracture of the left eighth lateral rib. Chronic appearing bilateral rib deformities. Surgical management was recommended and she is S/P T9-L3 open fusion with PSO/lag screw partial corpectomy/interbody by Dr. Stroud, Neurosurgeon with complex Plastic Surgery closure by Dr. Elias on 9/29/24. Post op course c/b CSF leak s/p repair, new onset paroxsymal Afib with RVR,  increased pain and drainage from surgical incision, development of pleural effusions, symptomatic orthostatic hypotension, urinary retention, post op anemia, and chest pain with spontaneous resolution. Cardiac work up negative. Patient was evaluated by PM&R and therapy for functional deficits, gait/ADL impairments and acute rehabilitation was recommended. Patient was cleared for discharge to [facility] IRF on 10/16/24.     Rehab course complicated by RRT on 10/20 for fever of 103.9 f, tachycardia, and AMS. Infectious workup following RRT notable for leukocytosis to 10.58, lactic acid 2.6, U/A with positive nitrites, large LE, 25 WBCs, urine culture growing klebsiella aerogenes and e. coli, and 10/20 blood cultures x 2 with MSSA. Repeat blood cultures on 10/21 with NGTD. CT head without acute pathology. CT T and L spine: Unremarkable CT scan of the thoracic spine.   No vertebral fracture is recognized. ID was consulted while at [facility] who recommended Transfer request initiated from [place] to [facility] for MICHAEL and completion of bacteremia workup/treatment.      Patient has had unchanged persistent back pain since surgery. Other than spinal hardware, patient reports she had a right knee replacement this year, two hip replacements (one 2 years ago and one 20 years ago), and right shoulder replacement 10 years ago. States none of these sites have been bothering her. Denies any prosthetic heart valves or murmurs. Clincal exam notable for fluctuance over lower L spine.     Abx  -Cefazolin 2 g IVPB q8h ( 10/23-)   -s/p Vancomycin, Aztreonam and cefepime     #MSSA bacteremia  #recent spinal surgery with hardware  #presence of other prosthetic joints   -continue with Cefazolin 2 g IVPB q8h  -obtain MRI T and L spine for better assessment of spinal hardware and other infectious process in spinal region   -f/u TTE   -f/u all culture data  -monitor WBC and fever curve     Case seen and discussed with Dr. Waterman who agrees with assessment and plan. Note not final until attending addendum.

## 2024-10-23 NOTE — PHYSICAL THERAPY INITIAL EVALUATION ADULT - PERTINENT HX OF CURRENT PROBLEM, REHAB EVAL
Pt is a 74-year-old female patient with past medical history of OA, HTN, gastroparesis, peripheral neuropathy, multiple prior thoracolumbar spine surgeries for congenital scoliosis done >[place] of [place] w/ removal of hardware (1991) who presented to [facility] on 9/28/24 with back pain and left hip pain s/p mechanical fall. CT imaging was performed and reported a T12 three column spine fracture-malalignment with fracture extending to the adjacent right 12th posterior rib and left T12-L1 facet joint and acute, displaced fracture of the left eighth lateral rib. Chronic appearing bilateral rib deformities. Surgical management was recommended and she is S/P T9-L3 open fusion with PSO/lag screw partial corpectomy/interbody by Dr. Stroud, Neurosurgeon with complex Plastic Surgery closure by Dr. Elias on 9/29/24. Post op course c/b CSK leak s/p repair, new onset paroxsymal Afib with RVR, increased pain and drainage from surgical incision, development of pleural effusions, symptomatic orthostatic hypotension, urinary retention, post op anemia, and chest pain with spontaneous resolution. Cardiac work up negative. Patient was evaluated by PM&R and therapy for functional deficits, gait/ADL impairments and acute rehabilitation was recommended. Patient was cleared for discharge to [facility] IRF on 10/16/24. (17 Oct 2024 10:26) Rehab course significant for change in mental status, rrt called.

## 2024-10-23 NOTE — PHYSICAL THERAPY INITIAL EVALUATION ADULT - ADDITIONAL COMMENTS
Pt most recently at [place] [facility]; prior to rehab patient lives with her son in an elevator accessible apartment building with no stairs to enter. PTA, pt. was independent in ADLs & mobility with RW. As per patient, her son is a special child & requires assistance. Pt most recently at [facility]; prior to rehab patient lives with her son in an elevator accessible apartment building with no stairs to enter. PTA, pt. was independent in ADLs & mobility with RW.

## 2024-10-23 NOTE — PHYSICAL THERAPY INITIAL EVALUATION ADULT - ACTIVE RANGE OF MOTION EXAMINATION, REHAB EVAL
susie. upper extremity Active ROM was WNL (within normal limits)/bilateral lower extremity Active ROM was WNL (within normal limits)

## 2024-10-23 NOTE — H&P ADULT - HISTORY OF PRESENT ILLNESS
74-year-old female patient with past medical history of OA, HTN, gastroparesis, peripheral neuropathy, multiple prior thoracolumbar spine surgeries for congenital scoliosis done >10 years ago [place] of [place] w/ removal of hardware (1991) who presented to [facility] on 9/28/24 with back pain and left hip pain s/p mechanical fall. CT imaging was performed and reported a T12 three column spine fracture-malalignment with fracture extending to the adjacent right 12th posterior rib and left T12-L1 facet joint and acute, displaced fracture of the left eighth lateral rib. Chronic appearing bilateral rib deformities. Surgical management was recommended and she is S/P T9-L3 open fusion with PSO/lag screw partial corpectomy/interbody by Dr. Stroud, Neurosurgeon with complex Plastic Surgery closure by Dr. Elias on 9/29/24. Post op course c/b CSK leak s/p repair, new onset paroxsymal Afib with RVR,  increased pain and drainage from surgical incision, development of pleural effusions, symptomatic orthostatic hypotension, urinary retention, post op anemia, and chest pain with spontaneous resolution. Cardiac work up negative. Patient was evaluated by PM&R and therapy for functional deficits, gait/ADL impairments and acute rehabilitation was recommended. Patient was cleared for discharge to [facility] IRF on 10/16/24. (17 Oct 2024 10:26)  Rehab course significant for change in mental status, rrt called.     Patient was febrile to 103, tachycardic, and subjective fever/chills. Sepsis w/up initiated and found to have small right upper lobe infiltrate, UTI, and MSSA bacteremia. CTH negative. Pending CT spine. Patient remains febrile despite tylenol and nonpharmacologic cooling measures. Patient otherwise asymptomatic. Hospitalist and ID consulted at [place] made aware and per G+ bacteremia guidelines, recommending completing infectious workup, which includes MICHAEL that is not available at [place] location. Patient also requires PICC line placement for long term ABx. Transfer request initiated from [place] to [facility] for MICHAEL and completion of bacteremia workup/treatment. All other medical co-morbidities were stable. Discharge instructions were discussed with patient and family. All questions answered and all concerns addressed. Patient was deemed medically stable for discharge to [facility] medicine on 10/21.

## 2024-10-23 NOTE — H&P ADULT - PROBLEM SELECTOR PLAN 4
- TTE 61%  - S/P 1L IVF bolus (10/13) for symptomatic hypotension  - patient converted back to SR. if AF is persistent will need AC  - 10/23 /66    Plan  - c/w Metoprolol to 12.5 BID  - c/w atorvastatin 40mg PO qd  - c/w HOLD Losartan 100 mg daily and Chlorthalidone 25 mg daily (restart if SBP is consistently above 140 per cardio)  - HOLD midodrine 7.5 mg AM - TTE: EF 61%  - S/P 1L IVF bolus (10/13) for symptomatic hypotension  - 10/23 /66    Plan  - c/w atorvastatin 40mg PO qd  - c/w HOLD Losartan 100 mg daily and Chlorthalidone 25 mg daily (restart if SBP is consistently above 140 per cardio)  - HOLD midodrine 7.5 mg AM - 10/1 TTE: EF 61%  - S/P 1L IVF bolus (10/13) for symptomatic hypotension  - 10/23 /66    Plan  - c/w atorvastatin 40mg PO qd  - c/w HOLD Losartan 100 mg daily and Chlorthalidone 25 mg daily (restart if SBP is consistently above 140 per cardio)  - HOLD midodrine 7.5 mg AM

## 2024-10-23 NOTE — PHYSICAL THERAPY INITIAL EVALUATION ADULT - GENERAL OBSERVATIONS, REHAB EVAL
Pt received semi-supine in bed, A&0x4, +IVL, +purewick, following 100% multi-step commands, son present at bedside.

## 2024-10-23 NOTE — CONSULT NOTE ADULT - SUBJECTIVE AND OBJECTIVE BOX
Patient is a 74-year-old female patient with past medical history of OA, HTN, gastroparesis, peripheral neuropathy, multiple prior thoracolumbar spine surgeries for congenital scoliosis [place]/ [place] of hardware (1991) who presented to [facility] on 9/28/24 with back pain and left hip pain s/p mechanical fall. CT imaging was performed and reported a T12 three column spine fracture-malalignment with fracture extending to the adjacent right 12th posterior rib and left T12-L1 facet joint and acute, displaced fracture of the left eighth lateral rib. Chronic appearing bilateral rib deformities. Surgical management was recommended and she is S/P T9-L3 open fusion with PSO/lag screw partial corpectomy/interbody by Dr. Stroud, Neurosurgeon with complex Plastic Surgery closure by Dr. Elias on 9/29/24. Post op course c/b CSF leak s/p repair, new onset paroxsymal Afib with RVR,  increased pain and drainage from surgical incision, development of pleural effusions, symptomatic orthostatic hypotension, urinary retention, post op anemia, and chest pain with spontaneous resolution. Cardiac work up negative. Patient was evaluated by PM&R and therapy for functional deficits, gait/ADL impairments and acute rehabilitation was recommended. Patient was cleared for discharge to [facility] IRF on 10/16/24. Rehab course complicated by RRT on 10/20 for fever of 103.9 f, tachycardia, and AMS. Infectious workup following RRT notable for leukocytosis to 10.58, lactic acid 2.6, U/A with positive nitrites, large LE, 25 WBCs, urine culture growing klebsiella aerogenes and e. coli, and 10/20 blood cultures x 2 with MSSA. Repeat blood cultures on 10/21 with NGTD. CT head without acute pathology. CT T and L spine: Unremarkable CT scan of the thoracic spine.   No vertebral fracture is recognized. ID was consulted whlillie at [facility] who recommended Transfer request initiated from [place] to [facility] for MICHAEL and completion of bacteremia workup/treatment.      Abx  -Cefazolin 2 g IVPB q8h ( 10/23-)   -s/p Vancomycin, Aztreonam and cefepime     REVIEW OF SYSTEMS  pending full examination    prior hospital charts reviewed [V]  primary team notes reviewed [V]  other consultant notes reviewed [V]    PAST MEDICAL & SURGICAL HISTORY:  H/O seasonal allergies      History of pulmonary embolism  developed after billings abigail surgery,1984 treated with coumadin 3 months, no issues after      Restless leg syndrome      GERD (gastroesophageal reflux disease)      OA (osteoarthritis)      Fungal infection of skin  under breast      H/O scoliosis      Essential hypertension      Depression      Osteoporosis      Right hip pain      2019 novel coronavirus disease (COVID-19)  Dec 2020- hospitalized for 3 days, did not require home O2, denies residual symptoms      Hiatal hernia      History of chronic pain      Peripheral neuropathy      S/P repair of paraesophageal hernia  2001      S/P spinal fusion  L4-L5 1966      Scoliosis  s/p placement of billings abigail x 2 1984      S/P spinal surgery  x 2 1985, 1986      Removal of pin, plate, abigail, or screw  1991- removal of billings abigail      S/P hysterectomy  1982      S/P hip replacement  left (2008)      S/P shoulder surgery  right (2012)      S/P dilatation and curettage  1981      H/O arthroscopy of knee  June 2021      S/P cataract surgery      History of bilateral hip replacements          SOCIAL HISTORY:  Denied smoking/vaping/alcohol/recreational drug use    FAMILY HISTORY:  Family history of abdominal aortic aneurysm (AAA) (Mother)        Allergies  Dilantin (Urticaria)  penicillins (Urticaria)        ANTIMICROBIALS:  ceFAZolin   IVPB 2000 every 8 hours      ANTIMICROBIALS (past 90 days):  MEDICATIONS  (STANDING):    cefepime   IVPB   100 mL/Hr IV Intermittent (10-23-24 @ 08:13)        OTHER MEDS:   MEDICATIONS  (STANDING):  acetaminophen     Tablet .. 975 every 8 hours  atorvastatin 40 at bedtime  cyclobenzaprine 5 three times a day PRN  DULoxetine 20 daily  heparin   Injectable 3500 every 6 hours PRN  heparin   Injectable 7000 once  heparin   Injectable 7000 every 6 hours PRN  heparin  Infusion.  <Continuous>  metoprolol tartrate 12.5 every 12 hours  pantoprazole    Tablet 40 before breakfast  pramipexole 0.5 at bedtime  pregabalin 100 every 8 hours      VITALS:  Vital Signs Last 24 Hrs  T(F): 100.1 (10-23-24 @ 07:56), Max: 103.9 (10-20-24 @ 12:46)    Vital Signs Last 24 Hrs  HR: 101 (10-23-24 @ 12:02) (101 - 122)  BP: 129/75 (10-23-24 @ 12:02) (111/68 - 145/76)  RR: 18 (10-23-24 @ 07:56)  SpO2: 96% (10-23-24 @ 12:02) (91% - 96%)  Wt(kg): --    EXAM:  pending full examination      Labs:                        8.8    8.36  )-----------( 378      ( 23 Oct 2024 08:26 )             29.7     10-23    134[L]  |  100  |  15  ----------------------------<  103[H]  3.3[L]   |  21[L]  |  0.67    Ca    8.0[L]      23 Oct 2024 08:26  Phos  1.5     10-23  Mg     2.0     10-23    TPro  6.3  /  Alb  3.0[L]  /  TBili  0.6  /  DBili  x   /  AST  42[H]  /  ALT  43  /  AlkPhos  233[H]  10-23      WBC Trend:  WBC Count: 8.36 (10-23-24 @ 08:26)  WBC Count: 10.50 (10-21-24 @ 06:20)  WBC Count: 10.58 (10-20-24 @ 13:14)      Auto Neutrophil #: 6.40 K/uL (10-23-24 @ 08:26)  Auto Neutrophil #: 8.34 K/uL (10-21-24 @ 06:20)  Auto Neutrophil #: 9.17 K/uL (10-20-24 @ 13:14)  Auto Neutrophil #: 3.75 K/uL (10-18-24 @ 09:12)  Auto Neutrophil #: 11.76 K/uL (09-29-24 @ 19:31)      Creatine Trend:  Creatinine: 0.67 (10-23)  Creatinine: 0.79 (10-21)  Creatinine: 0.97 (10-20)  Creatinine: 0.69 (10-18)      Liver Biochemical Testing Trend:  Alanine Aminotransferase (ALT/SGPT): 43 (10-23)  Alanine Aminotransferase (ALT/SGPT): 33 (10-21)  Alanine Aminotransferase (ALT/SGPT): 43 (10-20)  Alanine Aminotransferase (ALT/SGPT): 60 *H* (10-18)  Alanine Aminotransferase (ALT/SGPT): 44 (10-15)  Aspartate Aminotransferase (AST/SGOT): 42 (10-23-24 @ 08:26)  Aspartate Aminotransferase (AST/SGOT): 23 (10-21-24 @ 06:20)  Aspartate Aminotransferase (AST/SGOT): 24 (10-20-24 @ 13:14)  Aspartate Aminotransferase (AST/SGOT): 35 (10-18-24 @ 09:12)  Aspartate Aminotransferase (AST/SGOT): 31 (10-15-24 @ 07:03)  Bilirubin Total: 0.6 (10-23)  Bilirubin Total: 0.8 (10-21)  Bilirubin Total: 0.6 (10-20)  Bilirubin Total: 0.4 (10-18)  Bilirubin Total: 0.3 (10-15)      Trend LDH      Auto Eosinophil %: 1.8 % (10-23-24 @ 08:26)  Auto Eosinophil %: 0.1 % (10-21-24 @ 06:20)  Auto Eosinophil %: 0.6 % (10-20-24 @ 13:14)      MICROBIOLOGY:  Vancomycin Level, Trough: 15.3 (10-22 @ 09:24)    MRSA PCR Result.: NotDetec (11-13-23 @ 12:51)      Culture - Blood (collected 21 Oct 2024 18:02)  Source: .Blood BLOOD  Preliminary Report:    No growth at 24 hours    Culture - Blood (collected 21 Oct 2024 17:58)  Source: .Blood BLOOD  Preliminary Report:    No growth at 24 hours    Culture - Urine (collected 20 Oct 2024 17:15)  Source: Clean Catch Clean Catch (Midstream)  Preliminary Report:    >100,000 CFU/ml Escherichia coli    >100,000 CFU/ml Klebsiella aerogenes (Previously Enterobacter)    Susceptibility to follow.  Organism: Escherichia coli  Organism: Escherichia coli    Sensitivities:      Method Type: TAYA      -  Amoxicillin/Clavulanic Acid: S <=8/4      -  Ampicillin: S <=8 These ampicillin results predict results for amoxicillin      -  Ampicillin/Sulbactam: S <=4/2      -  Aztreonam: S <=4      -  Cefazolin: S <=2 For uncomplicated UTI with K. pneumoniae, E. coli, or P. mirablis: TAYA <=16 is sensitive and TAYA >=32 is resistant. This also predicts results for oral agents cefaclor, cefdinir, cefpodoxime, cefprozil, cefuroxime axetil, cephalexin and locarbef for uncomplicated UTI. Note that some isolates may be susceptible to these agents while testing resistant to cefazolin.      -  Cefepime: S <=2      -  Cefoxitin: S <=8      -  Ceftriaxone: S <=1      -  Cefuroxime: S <=4      -  Ciprofloxacin: S <=0.25      -  Ertapenem: S <=0.5      -  Gentamicin: S <=2      -  Imipenem: S <=1      -  Levofloxacin: S <=0.5      -  Meropenem: S <=1      -  Nitrofurantoin: S <=32 Should not be used to treat pyelonephritis      -  Piperacillin/Tazobactam: S <=8      -  Tobramycin: S <=2      -  Trimethoprim/Sulfamethoxazole: S <=0.5/9.5    Culture - Blood (collected 20 Oct 2024 13:14)  Source: .Blood BLOOD  Final Report:    Growth in aerobic and anaerobic bottles: Staphylococcus aureus    See previous culture [id number]    Culture - Blood (collected 20 Oct 2024 13:07)  Source: .Blood BLOOD  Final Report:    Growth in aerobic and anaerobic bottles: Staphylococcus aureus    Direct identification is available within approximately 3-5    hours either by Blood Panel Multiplexed PCR or Direct    MALDI-TOF. Details: [email or web address]  Organism: Blood Culture PCR  Staphylococcus aureus  Organism: Staphylococcus aureus    Sensitivities:      Method Type: TAYA      -  Clindamycin: R <=0.25 This isolate is presumed to be clindamycin resistant based on detection of inducible resistance. Clindamycin may still be effective in some patients.      -  Erythromycin: R >4      -  Gentamicin: S <=1 Should not be used as monotherapy      -  Oxacillin: S <=0.25 Oxacillin predicts susceptibility for dicloxacillin, methicillin, and nafcillin      -  Penicillin: R >8      -  Rifampin: S <=1 Should not be used as monotherapy      -  Tetracycline: S <=1      -  Trimethoprim/Sulfamethoxazole: S <=0.5/9.5      -  Vancomycin: S 1  Organism: Blood Culture PCR    Sensitivities:      Method Type: PCR      -  Methicillin SENSITIVE Staphylococcus aureus (MSSA): Detec Any isolate of Staphylococcus aureus from a blood culture is NOT considered a contaminant.    Culture - Urine (collected 29 Jan 2024 02:13)  Source: Clean Catch Clean Catch (Midstream)  Final Report:    >=3 organisms. Probable collection contamination.    Culture - Urine (collected 06 Oct 2023 19:53)  Source: Clean Catch Clean Catch (Midstream)  Final Report:    <10,000 CFU/mL Normal Urogenital Hoa                          COVID-19 PCR: NotDetec (10-21-24 @ 09:20)  COVID-19 PCR: NotDetec (10-17-24 @ 16:34)          C-Reactive Protein: 183 (10-21)              Lactate, Blood: 1.6 (10-23 @ 08:26)  Lactate, Blood: 1.4 (10-21 @ 06:20)  Lactate, Blood: 1.7 (10-20 @ 22:00)  Lactate, Blood: 2.9 (10-20 @ 17:08)        RADIOLOGY:  imaging below personally reviewed   Patient is a 74-year-old female patient with past medical history of OA, HTN, gastroparesis, peripheral neuropathy, multiple prior thoracolumbar spine surgeries for congenital scoliosis [place]/ [place]1991) who presented to [facility] on 9/28/24 with back pain and left hip pain s/p mechanical fall. CT imaging was performed and reported a T12 three column spine fracture-malalignment with fracture extending to the adjacent right 12th posterior rib and left T12-L1 facet joint and acute, displaced fracture of the left eighth lateral rib. Chronic appearing bilateral rib deformities. Surgical management was recommended and she is S/P T9-L3 open fusion with PSO/lag screw partial corpectomy/interbody by Dr. Stroud, Neurosurgeon with complex Plastic Surgery closure by Dr. Elias on 9/29/24. Post op course c/b CSF leak s/p repair, new onset paroxsymal Afib with RVR,  increased pain and drainage from surgical incision, development of pleural effusions, symptomatic orthostatic hypotension, urinary retention, post op anemia, and chest pain with spontaneous resolution. Cardiac work up negative. Patient was evaluated by PM&R and therapy for functional deficits, gait/ADL impairments and acute rehabilitation was recommended. Patient was cleared for discharge to [facility] IRF on 10/16/24. Rehab course complicated by RRT on 10/20 for fever of 103.9 f, tachycardia, and AMS. Infectious workup following RRT notable for leukocytosis to 10.58, lactic acid 2.6, U/A with positive nitrites, large LE, 25 WBCs, urine culture growing klebsiella aerogenes and e. coli, and 10/20 blood cultures x 2 with MSSA. Repeat blood cultures on 10/21 with NGTD. CT head without acute pathology. CT T and L spine: Unremarkable CT scan of the thoracic spine.   No vertebral fracture is recognized. ID was consulted whlillie at [facility] who recommended Transfer request initiated from [place] to [facility] for MICHAEL and completion of bacteremia workup/treatment.      Patient states her back pain has been "horrific" since the surgery. It has not worsened or improved. Reports the pain in her back is more central in location in terms of where surgery was performed. Denies any drainage from the wound. Other than spinal hardware, patient reports she had a right knee replacement this year, two hip replacements (one 2 years ago and one 20 years ago), and right shoulder replacement 10 years ago. States none of these sites have been bothering her. Denies any prosthetic heart valves or murmurs. Reports she was a difficulty stick in regards to IVs but denies any new area of swelling, redness or pain on extremities. In regards to urinary sx, denies dysuria or hematuria but reports increased urinary frequency. Denies cough, sore throat, chest pain, congestion.     Patient reports she had PCN allergy testing done a few years ago which was negative.     Abx  -Cefazolin 2 g IVPB q8h ( 10/23-)   -s/p Vancomycin, Aztreonam and cefepime     REVIEW OF SYSTEMS  Constitutional: No fevers, No chills, No weight loss, No fatigue   Skin: No rash, no phlebitis	  Eyes: No discharge, No change in vision	  ENMT: No sore throat, No ulcers  Respiratory: No cough, no SOB  Cardiovascular:  No chest pain, No palpitations   Gastrointestinal: No pain, No nausea, No vomiting, No diarrhea, No constipation	  Genitourinary: No dysuria, + frequency, No hesitancy, No flank pain  MSK: No Joint pain, + back pain, No edema  Neurological: No HA, no weakness, no seizures, no AMS     prior hospital charts reviewed [V]  primary team notes reviewed [V]  other consultant notes reviewed [V]    PAST MEDICAL & SURGICAL HISTORY:  H/O seasonal allergies      History of pulmonary embolism  developed after billings abigail surgery,1984 treated with coumadin 3 months, no issues after      Restless leg syndrome      GERD (gastroesophageal reflux disease)      OA (osteoarthritis)      Fungal infection of skin  under breast      H/O scoliosis      Essential hypertension      Depression      Osteoporosis      Right hip pain      2019 novel coronavirus disease (COVID-19)  Dec 2020- hospitalized for 3 days, did not require home O2, denies residual symptoms      Hiatal hernia      History of chronic pain      Peripheral neuropathy      S/P repair of paraesophageal hernia  2001      S/P spinal fusion  L4-L5 1966      Scoliosis  s/p placement of billings abigail x 2 1984      S/P spinal surgery  x 2 1985, 1986      Removal of pin, plate, abigail, or screw  1991- removal of billings abigail      S/P hysterectomy  1982      S/P hip replacement  left (2008)      S/P shoulder surgery  right (2012)      S/P dilatation and curettage  1981      H/O arthroscopy of knee  June 2021      S/P cataract surgery      History of bilateral hip replacements          SOCIAL HISTORY:  Denied smoking/vaping/alcohol/recreational drug use    FAMILY HISTORY:  Family history of abdominal aortic aneurysm (AAA) (Mother)        Allergies  Dilantin (Urticaria)  penicillins (Urticaria)        ANTIMICROBIALS:  ceFAZolin   IVPB 2000 every 8 hours      ANTIMICROBIALS (past 90 days):  MEDICATIONS  (STANDING):    cefepime   IVPB   100 mL/Hr IV Intermittent (10-23-24 @ 08:13)        OTHER MEDS:   MEDICATIONS  (STANDING):  acetaminophen     Tablet .. 975 every 8 hours  atorvastatin 40 at bedtime  cyclobenzaprine 5 three times a day PRN  DULoxetine 20 daily  heparin   Injectable 3500 every 6 hours PRN  heparin   Injectable 7000 once  heparin   Injectable 7000 every 6 hours PRN  heparin  Infusion.  <Continuous>  metoprolol tartrate 12.5 every 12 hours  pantoprazole    Tablet 40 before breakfast  pramipexole 0.5 at bedtime  pregabalin 100 every 8 hours      VITALS:  Vital Signs Last 24 Hrs  T(F): 100.1 (10-23-24 @ 07:56), Max: 103.9 (10-20-24 @ 12:46)    Vital Signs Last 24 Hrs  HR: 101 (10-23-24 @ 12:02) (101 - 122)  BP: 129/75 (10-23-24 @ 12:02) (111/68 - 145/76)  RR: 18 (10-23-24 @ 07:56)  SpO2: 96% (10-23-24 @ 12:02) (91% - 96%)  Wt(kg): --    EXAM:  General: Patient appears comfortable, no acute distress  HEENT: NCAT, PERRL, anicteric sclera, mucous membranes moist and intact  Neck: Supple, No lymphadenopathy  CV: +irregularly irregular.   Lungs: No respiratory distress, CTA b/l, no wheezing, rales or rhonchi  Abd:  BS4+, Soft, NTND, no guarding  : No suprapubic tenderness  Neuro: AAOx3. No focal deficits noted.   Ext: No cyanosis, no edema  Msk: freely moving upper and lower extremities  Skin: +midline thoracic to lumbar linear surgical scar with scattered sutures in place with no drainage but mild erythema. +area of fluctuance noted to lower L spine. No midline or paraspinal tenderness to palpation.       Labs:                        8.8    8.36  )-----------( 378      ( 23 Oct 2024 08:26 )             29.7     10-23    134[L]  |  100  |  15  ----------------------------<  103[H]  3.3[L]   |  21[L]  |  0.67    Ca    8.0[L]      23 Oct 2024 08:26  Phos  1.5     10-23  Mg     2.0     10-23    TPro  6.3  /  Alb  3.0[L]  /  TBili  0.6  /  DBili  x   /  AST  42[H]  /  ALT  43  /  AlkPhos  233[H]  10-23      WBC Trend:  WBC Count: 8.36 (10-23-24 @ 08:26)  WBC Count: 10.50 (10-21-24 @ 06:20)  WBC Count: 10.58 (10-20-24 @ 13:14)      Auto Neutrophil #: 6.40 K/uL (10-23-24 @ 08:26)  Auto Neutrophil #: 8.34 K/uL (10-21-24 @ 06:20)  Auto Neutrophil #: 9.17 K/uL (10-20-24 @ 13:14)  Auto Neutrophil #: 3.75 K/uL (10-18-24 @ 09:12)  Auto Neutrophil #: 11.76 K/uL (09-29-24 @ 19:31)      Creatine Trend:  Creatinine: 0.67 (10-23)  Creatinine: 0.79 (10-21)  Creatinine: 0.97 (10-20)  Creatinine: 0.69 (10-18)      Liver Biochemical Testing Trend:  Alanine Aminotransferase (ALT/SGPT): 43 (10-23)  Alanine Aminotransferase (ALT/SGPT): 33 (10-21)  Alanine Aminotransferase (ALT/SGPT): 43 (10-20)  Alanine Aminotransferase (ALT/SGPT): 60 *H* (10-18)  Alanine Aminotransferase (ALT/SGPT): 44 (10-15)  Aspartate Aminotransferase (AST/SGOT): 42 (10-23-24 @ 08:26)  Aspartate Aminotransferase (AST/SGOT): 23 (10-21-24 @ 06:20)  Aspartate Aminotransferase (AST/SGOT): 24 (10-20-24 @ 13:14)  Aspartate Aminotransferase (AST/SGOT): 35 (10-18-24 @ 09:12)  Aspartate Aminotransferase (AST/SGOT): 31 (10-15-24 @ 07:03)  Bilirubin Total: 0.6 (10-23)  Bilirubin Total: 0.8 (10-21)  Bilirubin Total: 0.6 (10-20)  Bilirubin Total: 0.4 (10-18)  Bilirubin Total: 0.3 (10-15)      Trend LDH      Auto Eosinophil %: 1.8 % (10-23-24 @ 08:26)  Auto Eosinophil %: 0.1 % (10-21-24 @ 06:20)  Auto Eosinophil %: 0.6 % (10-20-24 @ 13:14)      MICROBIOLOGY:  Vancomycin Level, Trough: 15.3 (10-22 @ 09:24)    MRSA PCR Result.: NotDetec (11-13-23 @ 12:51)      Culture - Blood (collected 21 Oct 2024 18:02)  Source: .Blood BLOOD  Preliminary Report:    No growth at 24 hours    Culture - Blood (collected 21 Oct 2024 17:58)  Source: .Blood BLOOD  Preliminary Report:    No growth at 24 hours    Culture - Urine (collected 20 Oct 2024 17:15)  Source: Clean Catch Clean Catch (Midstream)  Preliminary Report:    >100,000 CFU/ml Escherichia coli    >100,000 CFU/ml Klebsiella aerogenes (Previously Enterobacter)    Susceptibility to follow.  Organism: Escherichia coli  Organism: Escherichia coli    Sensitivities:      Method Type: TAYA      -  Amoxicillin/Clavulanic Acid: S <=8/4      -  Ampicillin: S <=8 These ampicillin results predict results for amoxicillin      -  Ampicillin/Sulbactam: S <=4/2      -  Aztreonam: S <=4      -  Cefazolin: S <=2 For uncomplicated UTI with K. pneumoniae, E. coli, or P. mirablis: TAYA <=16 is sensitive and TAYA >=32 is resistant. This also predicts results for oral agents cefaclor, cefdinir, cefpodoxime, cefprozil, cefuroxime axetil, cephalexin and locarbef for uncomplicated UTI. Note that some isolates may be susceptible to these agents while testing resistant to cefazolin.      -  Cefepime: S <=2      -  Cefoxitin: S <=8      -  Ceftriaxone: S <=1      -  Cefuroxime: S <=4      -  Ciprofloxacin: S <=0.25      -  Ertapenem: S <=0.5      -  Gentamicin: S <=2      -  Imipenem: S <=1      -  Levofloxacin: S <=0.5      -  Meropenem: S <=1      -  Nitrofurantoin: S <=32 Should not be used to treat pyelonephritis      -  Piperacillin/Tazobactam: S <=8      -  Tobramycin: S <=2      -  Trimethoprim/Sulfamethoxazole: S <=0.5/9.5    Culture - Blood (collected 20 Oct 2024 13:14)  Source: .Blood BLOOD  Final Report:    Growth in aerobic and anaerobic bottles: Staphylococcus aureus    See previous culture [id number]    Culture - Blood (collected 20 Oct 2024 13:07)  Source: .Blood BLOOD  Final Report:    Growth in aerobic and anaerobic bottles: Staphylococcus aureus    Direct identification is available within approximately 3-5    hours either by Blood Panel Multiplexed PCR or Direct    MALDI-TOF. Details: [email or web address]  Organism: Blood Culture PCR  Staphylococcus aureus  Organism: Staphylococcus aureus    Sensitivities:      Method Type: TAYA      -  Clindamycin: R <=0.25 This isolate is presumed to be clindamycin resistant based on detection of inducible resistance. Clindamycin may still be effective in some patients.      -  Erythromycin: R >4      -  Gentamicin: S <=1 Should not be used as monotherapy      -  Oxacillin: S <=0.25 Oxacillin predicts susceptibility for dicloxacillin, methicillin, and nafcillin      -  Penicillin: R >8      -  Rifampin: S <=1 Should not be used as monotherapy      -  Tetracycline: S <=1      -  Trimethoprim/Sulfamethoxazole: S <=0.5/9.5      -  Vancomycin: S 1  Organism: Blood Culture PCR    Sensitivities:      Method Type: PCR      -  Methicillin SENSITIVE Staphylococcus aureus (MSSA): Detec Any isolate of Staphylococcus aureus from a blood culture is NOT considered a contaminant.    Culture - Urine (collected 29 Jan 2024 02:13)  Source: Clean Catch Clean Catch (Midstream)  Final Report:    >=3 organisms. Probable collection contamination.    Culture - Urine (collected 06 Oct 2023 19:53)  Source: Clean Catch Clean Catch (Midstream)  Final Report:    <10,000 CFU/mL Normal Urogenital Hoa                          COVID-19 PCR: NotDetec (10-21-24 @ 09:20)  COVID-19 PCR: NotDetec (10-17-24 @ 16:34)          C-Reactive Protein: 183 (10-21)              Lactate, Blood: 1.6 (10-23 @ 08:26)  Lactate, Blood: 1.4 (10-21 @ 06:20)  Lactate, Blood: 1.7 (10-20 @ 22:00)  Lactate, Blood: 2.9 (10-20 @ 17:08)        RADIOLOGY:    ACC: [id number] EXAM:  CT LUMBAR SPINE   ORDERED BY:  SIDNEY ALVARENGA     ACC: [id number] EXAM:  CT THORACIC SPINE   ORDERED BY:  SIDNEY ALVARENGA     PROCEDURE DATE:  10/21/2024          INTERPRETATION:  CT thoracic and lumbar spine without IV contrast    CLINICAL INFORMATION:  Postoperative fever  Back pain and fusion.    TECHNIQUE:  Contiguous axial 2 mm sections were obtained through the   thoracic and lumbar spine using a single helical acquisition.     Additional 2 mm sagittal and coronal reconstructions of the spine were   obtained. These additional reformatted images were used to evaluate the   spine for alignment, vertebral fractures and the integrity of the the   posterior elements.   This scan was performed using automatic exposure   control (radiation dose reduction software) to obtain a diagnostic image   quality scan with patient dose as low as reasonably achievable.    FINDINGS:   X-rays dated 10/12/2024 available for review    Posterior fusion extends from T9 to L3 in unchanged position. A   corpectomy device is seen within T12. Laminectomies noted at T12.   Thoracic and lumbar vertebral body heights are maintained. No interval   vertebral fracture is seen. No destructive bone lesion is found.   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 Alignment is significant for straightening although   there is a step-off at the level of the T12 superior endplate due to the   presence of the fracture. There isossification of the posterior spinous   process ligament extending from the mid thoracic spine inferiorly.    Upper thoracic intervertebral disc spaces appear intact. Lower thoracic   and lumbar intervertebral disc levels are fused due to ossification.    No paraspinal mass is recognized.  Paraspinal soft tissues appear intact.      IMPRESSION:  Unremarkable CT scan of the thoracic spine.   No vertebral   fracture is recognized.    --- End of Report ---             ANGELIKA RIVERA MD; Attending Radiologist  This document has been electronically signed. Oct 21 2024  2:21PM

## 2024-10-23 NOTE — H&P ADULT - ATTENDING COMMENTS
75 yo F with PMH of HTN, gastroparesis, peripheral neuropathy, multiple prior thoracolumbar spine surgeries for congenital scoliosis presents with back pain s/p mechanical fall from sitting. Found with T12 three column spine fracture-malalignment with fracture extending to the adjacent right 12th posterior rib and left T12-L1 facet joint s/p T9-L3 fusion on 9/29 post-op course c/b recurrent brief Afib discharged to acute rehab where she had RRT 2/2 to sepsis, BCX + MSSA tx to [facility] for further eval    endorsing low back pain, relieved with moprhine. no further fevers or chills  On exam, NAD, NC/AT irregularly irregular rhythem, CTA b/l no w/r/r abd soft NT ND BS+ ext no LE edema, MSK- midline spinal scar well healed   Labs reviewed  CT T/L spine non con unremarkable      #Sepsis 2/2 to MSSA  #Paroxysmal atrial fibrillation  #HTN  # Depression  # Acute on Chronic back pain    - likely source of sepsis is MSSA bacteremia 4/4 +_ MSSA 10/20, less likely UTI- UCX with E coli and Klebsiella but patient asymptomatic- PCN allergy start IV ancef  - 2D echo, ID c/s, monitor BCX 10/21 NGTD  - NSG c/s  - also found to be back in a fib- ekg confirmed- tele monitoring- CHADSVASC is 5- hep gtt- cards c/s  -MRI T/L spine to exclude abscess/ OM  - can do hydromoprhone 2mg PO q6hr for mod pain, 4mg for severe pain q6hr, senna, miralax for bowel regimen  - hold BP meds for now as pressures are on softer side, monitor closely restart as needed    rest of plan as above

## 2024-10-23 NOTE — H&P ADULT - PROBLEM SELECTOR PLAN 5
- S/p PRBCs intraoperatively   - Hb stable with no evidence of active bleed at this time.     Plan  - Monitor H/H - S/p PRBCs intraoperatively   - Hb stable with no evidence of active bleed at this time   - 10/23 Hb 8.8 Hct 29.7    Plan  - Monitor H/H

## 2024-10-23 NOTE — H&P ADULT - PROBLEM SELECTOR PLAN 2
Patient   - 10/23 ECG shows irregular rate and rhythm ... Patient   - 10/23 ECG shows irregular rate and rhythm ...    Plan  - Consulted cardiology, rec to replete electrolytes and treat sepsis before addressing AFib, f/u reccs - 10/7 Afib, cardiology consult, patient converted back to SR. if AF is persistent will need AC  - 10/23 ECG shows  - Exam notable for irregular rate and rhythm    Plan  - Continue to monitor on telemetry, possible transfer to tele floor  - c/w Metoprolol to 12.5 BID  - Start heparin gtt for anticoagulation  - Consulted cardiology, rec to replete electrolytes and treat sepsis before addressing AFib, f/u reccs - 10/7 cardiology consult for Afib, patient converted back to SR. if AF is persistent will need AC  - 10/23 ECG shows  - Exam notable for irregular rate and rhythm    Plan  - Continue to monitor on telemetry, possible transfer to tele floor  - c/w Metoprolol to 12.5 BID  - Start heparin gtt for anticoagulation  - Consulted cardiology, rec to replete electrolytes and treat sepsis before addressing Afib, f/u reccs - 10/7 cardiology consult for Afib, patient converted back to SR. if AF is persistent will need AC  - 10/23 ECG and exam notable for irregular rate and rhythm    Plan  - Continue to monitor on telemetry, possible transfer to tele floor  - c/w Metoprolol to 12.5 BID  - Start heparin gtt for anticoagulation  - Consulted cardiology, rec to replete electrolytes and treat sepsis before addressing Afib, f/u reccs

## 2024-10-23 NOTE — H&P ADULT - ASSESSMENT
74-year-old female patient with past medical history of OA, HTN, gastroparesis, peripheral neuropathy, multiple prior thoracolumbar spine surgeries for congenital scoliosis [place]/ [place] of [place] (1991) and recent fall with T11-T12 fracture, s/p thoracolumbar posterior fusion for t spine fx 9/29, c/b post op csf leak, sent to rehab on 10/17, c/b sepsis with high fever, rigors, tachycardia on 10/20 found to have mssa bacteremia. CT spine no report of collection. Urine +ve E coli, transferred back to [facility] for MICHAEL, advanced spine imaging, and further management. 74-year-old female patient with past medical history of OA, HTN, gastroparesis, peripheral neuropathy, multiple prior thoracolumbar spine surgeries for congenital scoliosis [place] w/ removal of hardware (1991) and recent fall with T11-T12 fracture, s/p thoracolumbar posterior fusion for t spine fx 9/29, c/b post op csf leak, sent to rehab on 10/17, c/b sepsis with high fever, rigors, tachycardia on 10/20 found to have mssa bacteremia. CT spine no report of collection. Urine +ve E coli, transferred back to [facility] for MICHAEL, advanced spine imaging, and further management.    #sepsis due to MSSA bacteremia, suspect hardwire/spinal infection  - will consult ID for MSSA Bacteremia, appreciate ID recs from OSH  - CXR with old rib fracture and small RUL infiltrate  - +UA for Ecoli, follow urine culture  - lactic acidosis resolved.    #Traumatic T11-12 vertebral fracture s/p corpectomy and fusion surgery   - S/P T9-L3 open fusion with PSO/lag screw partial corpectomy/interbody with complex plastic closure (w/ Dr. Stroud and Dr. Tinajero on 9/29)   - TLSO when OOB  - Pain management: Tylenol 975mg ATC, Duloxetine 20mg daily, Lyrica 100mg TID , Flexeril 5mg TID PRN  - pt/ot/dvt ppx/ pain meds    # PAF, resolved  #HTN  #Orthostatic hypotension, resolved  - TTE 61%  - S/P 1L IVF bolus (10/13) for symptomatic hypotension  - patient converted back to SR. if AF is persistent will need AC  - HOLDING Losartan 100 mg daily and Chlorthalidone 25 mg daily (restart if SBP is consistently above 140 per cardio)  - Continue Metoprolol to 12.5 BID  - Continue Midodrine 7.5 mg AM (wean off as tolerated)     #Seasonal allergies/Nasal congestion  - Singulair     #HLD  - Atorvastatin     #Post op Anemia , stable  - S/p PRBCs intraoperatively   - Hb stable with no evidence of active bleed at this time.   - Monitor H/H    #SILVANA (obstructive sleep apnea).   - Transient desaturations on RA    - c/w Nocturnal 2L O2 and PRN during day during naps, monitor sats   - F/u with PCP outpatient as may benefit from sleep study and CPAP.    #Liver cyst  -10/10 CT Abd/pelvis- There is a large cyst, stable since prior exam. There are small hypodense foci on segment II and segment I too small to characterize, unchanged since prior.  -Outpatient f/u     #Restless Leg Syndrome  - Pramipexole     #Elevated alkaline phosphatase level   - monitor     #Depression  - Duloxetine     #Gastritis  - Pantoprazole    #DVT ppx: Lovenox

## 2024-10-23 NOTE — PHYSICAL THERAPY INITIAL EVALUATION ADULT - BALANCE TRAINING, PT EVAL
GOAL: Pt will improve standing balance by 1 grade from fair to good to improve ease with ADLs in 2 weeks.

## 2024-10-23 NOTE — PATIENT PROFILE ADULT - FALL HARM RISK - HARM RISK INTERVENTIONS

## 2024-10-24 LAB
-  AMOXICILLIN/CLAVULANIC ACID: SIGNIFICANT CHANGE UP
-  AMPICILLIN/SULBACTAM: SIGNIFICANT CHANGE UP
-  AMPICILLIN: SIGNIFICANT CHANGE UP
-  AZTREONAM: SIGNIFICANT CHANGE UP
-  CEFAZOLIN: SIGNIFICANT CHANGE UP
-  CEFEPIME: SIGNIFICANT CHANGE UP
-  CEFOXITIN: SIGNIFICANT CHANGE UP
-  CEFTRIAXONE: SIGNIFICANT CHANGE UP
-  CIPROFLOXACIN: SIGNIFICANT CHANGE UP
-  ERTAPENEM: SIGNIFICANT CHANGE UP
-  GENTAMICIN: SIGNIFICANT CHANGE UP
-  IMIPENEM: SIGNIFICANT CHANGE UP
-  LEVOFLOXACIN: SIGNIFICANT CHANGE UP
-  MEROPENEM: SIGNIFICANT CHANGE UP
-  NITROFURANTOIN: SIGNIFICANT CHANGE UP
-  PIPERACILLIN/TAZOBACTAM: SIGNIFICANT CHANGE UP
-  TOBRAMYCIN: SIGNIFICANT CHANGE UP
-  TRIMETHOPRIM/SULFAMETHOXAZOLE: SIGNIFICANT CHANGE UP
ALBUMIN SERPL ELPH-MCNC: 3 G/DL — LOW (ref 3.3–5)
ALP SERPL-CCNC: 239 U/L — HIGH (ref 40–120)
ALT FLD-CCNC: 38 U/L — SIGNIFICANT CHANGE UP (ref 10–45)
ANION GAP SERPL CALC-SCNC: 13 MMOL/L — SIGNIFICANT CHANGE UP (ref 5–17)
APTT BLD: 49.4 SEC — HIGH (ref 24.5–35.6)
APTT BLD: 63.6 SEC — HIGH (ref 24.5–35.6)
AST SERPL-CCNC: 29 U/L — SIGNIFICANT CHANGE UP (ref 10–40)
BASOPHILS # BLD AUTO: 0.03 K/UL — SIGNIFICANT CHANGE UP (ref 0–0.2)
BASOPHILS NFR BLD AUTO: 0.4 % — SIGNIFICANT CHANGE UP (ref 0–2)
BILIRUB SERPL-MCNC: 0.3 MG/DL — SIGNIFICANT CHANGE UP (ref 0.2–1.2)
BUN SERPL-MCNC: 16 MG/DL — SIGNIFICANT CHANGE UP (ref 7–23)
CALCIUM SERPL-MCNC: 8.1 MG/DL — LOW (ref 8.4–10.5)
CHLORIDE SERPL-SCNC: 103 MMOL/L — SIGNIFICANT CHANGE UP (ref 96–108)
CO2 SERPL-SCNC: 21 MMOL/L — LOW (ref 22–31)
CREAT SERPL-MCNC: 0.59 MG/DL — SIGNIFICANT CHANGE UP (ref 0.5–1.3)
CULTURE RESULTS: ABNORMAL
EGFR: 93 ML/MIN/1.73M2 — SIGNIFICANT CHANGE UP
EOSINOPHIL # BLD AUTO: 0.09 K/UL — SIGNIFICANT CHANGE UP (ref 0–0.5)
EOSINOPHIL NFR BLD AUTO: 1.2 % — SIGNIFICANT CHANGE UP (ref 0–6)
GLUCOSE SERPL-MCNC: 116 MG/DL — HIGH (ref 70–99)
HCT VFR BLD CALC: 24.9 % — LOW (ref 34.5–45)
HGB BLD-MCNC: 7.7 G/DL — LOW (ref 11.5–15.5)
IMM GRANULOCYTES NFR BLD AUTO: 0.8 % — SIGNIFICANT CHANGE UP (ref 0–0.9)
LYMPHOCYTES # BLD AUTO: 1.26 K/UL — SIGNIFICANT CHANGE UP (ref 1–3.3)
LYMPHOCYTES # BLD AUTO: 16.1 % — SIGNIFICANT CHANGE UP (ref 13–44)
MAGNESIUM SERPL-MCNC: 1.8 MG/DL — SIGNIFICANT CHANGE UP (ref 1.6–2.6)
MCHC RBC-ENTMCNC: 27.4 PG — SIGNIFICANT CHANGE UP (ref 27–34)
MCHC RBC-ENTMCNC: 30.9 GM/DL — LOW (ref 32–36)
MCV RBC AUTO: 88.6 FL — SIGNIFICANT CHANGE UP (ref 80–100)
METHOD TYPE: SIGNIFICANT CHANGE UP
MONOCYTES # BLD AUTO: 0.62 K/UL — SIGNIFICANT CHANGE UP (ref 0–0.9)
MONOCYTES NFR BLD AUTO: 7.9 % — SIGNIFICANT CHANGE UP (ref 2–14)
NEUTROPHILS # BLD AUTO: 5.75 K/UL — SIGNIFICANT CHANGE UP (ref 1.8–7.4)
NEUTROPHILS NFR BLD AUTO: 73.6 % — SIGNIFICANT CHANGE UP (ref 43–77)
NRBC # BLD: 0 /100 WBCS — SIGNIFICANT CHANGE UP (ref 0–0)
ORGANISM # SPEC MICROSCOPIC CNT: ABNORMAL
ORGANISM # SPEC MICROSCOPIC CNT: ABNORMAL
ORGANISM # SPEC MICROSCOPIC CNT: SIGNIFICANT CHANGE UP
PHOSPHATE SERPL-MCNC: 2.1 MG/DL — LOW (ref 2.5–4.5)
PLATELET # BLD AUTO: 332 K/UL — SIGNIFICANT CHANGE UP (ref 150–400)
POTASSIUM SERPL-MCNC: 3.5 MMOL/L — SIGNIFICANT CHANGE UP (ref 3.5–5.3)
POTASSIUM SERPL-SCNC: 3.5 MMOL/L — SIGNIFICANT CHANGE UP (ref 3.5–5.3)
PROT SERPL-MCNC: 5.7 G/DL — LOW (ref 6–8.3)
RBC # BLD: 2.81 M/UL — LOW (ref 3.8–5.2)
RBC # FLD: 15.6 % — HIGH (ref 10.3–14.5)
SODIUM SERPL-SCNC: 137 MMOL/L — SIGNIFICANT CHANGE UP (ref 135–145)
SPECIMEN SOURCE: SIGNIFICANT CHANGE UP
WBC # BLD: 7.81 K/UL — SIGNIFICANT CHANGE UP (ref 3.8–10.5)
WBC # FLD AUTO: 7.81 K/UL — SIGNIFICANT CHANGE UP (ref 3.8–10.5)

## 2024-10-24 PROCEDURE — 99233 SBSQ HOSP IP/OBS HIGH 50: CPT | Mod: GC

## 2024-10-24 PROCEDURE — 99233 SBSQ HOSP IP/OBS HIGH 50: CPT

## 2024-10-24 PROCEDURE — 99222 1ST HOSP IP/OBS MODERATE 55: CPT

## 2024-10-24 RX ORDER — ERYTHROMYCIN STEARATE 250 MG
128 TABLET ORAL EVERY 12 HOURS
Refills: 0 | Status: DISCONTINUED | OUTPATIENT
Start: 2024-10-24 | End: 2024-10-25

## 2024-10-24 RX ORDER — IBUPROFEN 200 MG
400 TABLET ORAL ONCE
Refills: 0 | Status: COMPLETED | OUTPATIENT
Start: 2024-10-24 | End: 2024-10-24

## 2024-10-24 RX ORDER — HYDROMORPHONE HCL/0.9% NACL/PF 6 MG/30 ML
2 PATIENT CONTROLLED ANALGESIA SYRINGE INTRAVENOUS EVERY 8 HOURS
Refills: 0 | Status: DISCONTINUED | OUTPATIENT
Start: 2024-10-24 | End: 2024-10-25

## 2024-10-24 RX ORDER — POTASSIUM PHOSPHATE 236; 224 MG/ML; MG/ML
30 INJECTION, SOLUTION INTRAVENOUS ONCE
Refills: 0 | Status: COMPLETED | OUTPATIENT
Start: 2024-10-24 | End: 2024-10-24

## 2024-10-24 RX ORDER — PRAMIPEXOLE DIHYDROCHLORIDE 0.12 MG/1
0.75 TABLET ORAL
Refills: 0 | Status: DISCONTINUED | OUTPATIENT
Start: 2024-10-24 | End: 2024-10-25

## 2024-10-24 RX ORDER — HEPARIN SODIUM 10000 [USP'U]/ML
1700 INJECTION INTRAVENOUS; SUBCUTANEOUS
Qty: 25000 | Refills: 0 | Status: DISCONTINUED | OUTPATIENT
Start: 2024-10-24 | End: 2024-10-25

## 2024-10-24 RX ADMIN — Medication 12.5 MILLIGRAM(S): at 09:01

## 2024-10-24 RX ADMIN — Medication 975 MILLIGRAM(S): at 05:59

## 2024-10-24 RX ADMIN — Medication 975 MILLIGRAM(S): at 21:43

## 2024-10-24 RX ADMIN — Medication 40 MILLIGRAM(S): at 21:42

## 2024-10-24 RX ADMIN — Medication 100 MILLIGRAM(S): at 05:29

## 2024-10-24 RX ADMIN — Medication 3 MILLIGRAM(S): at 21:42

## 2024-10-24 RX ADMIN — POTASSIUM PHOSPHATE 83.33 MILLIMOLE(S): 236; 224 INJECTION, SOLUTION INTRAVENOUS at 09:01

## 2024-10-24 RX ADMIN — Medication 975 MILLIGRAM(S): at 05:29

## 2024-10-24 RX ADMIN — HEPARIN SODIUM 17 UNIT(S)/HR: 10000 INJECTION INTRAVENOUS; SUBCUTANEOUS at 09:14

## 2024-10-24 RX ADMIN — PREGABALIN 100 MILLIGRAM(S): 150 CAPSULE ORAL at 05:29

## 2024-10-24 RX ADMIN — Medication 1 TABLET(S): at 12:20

## 2024-10-24 RX ADMIN — PRAMIPEXOLE DIHYDROCHLORIDE 0.75 MILLIGRAM(S): 0.12 TABLET ORAL at 00:13

## 2024-10-24 RX ADMIN — Medication 2 MILLIGRAM(S): at 17:10

## 2024-10-24 RX ADMIN — Medication 975 MILLIGRAM(S): at 01:02

## 2024-10-24 RX ADMIN — Medication 400 MILLIGRAM(S): at 03:25

## 2024-10-24 RX ADMIN — Medication 400 MILLIGRAM(S): at 02:55

## 2024-10-24 RX ADMIN — Medication 100 MILLIGRAM(S): at 21:43

## 2024-10-24 RX ADMIN — Medication 975 MILLIGRAM(S): at 00:12

## 2024-10-24 RX ADMIN — PREGABALIN 100 MILLIGRAM(S): 150 CAPSULE ORAL at 17:12

## 2024-10-24 RX ADMIN — CYCLOBENZAPRINE HYDROCHLORIDE 5 MILLIGRAM(S): 30 CAPSULE, EXTENDED RELEASE ORAL at 02:55

## 2024-10-24 RX ADMIN — Medication 975 MILLIGRAM(S): at 22:18

## 2024-10-24 RX ADMIN — CYCLOBENZAPRINE HYDROCHLORIDE 5 MILLIGRAM(S): 30 CAPSULE, EXTENDED RELEASE ORAL at 09:20

## 2024-10-24 RX ADMIN — DULOXETINE HYDROCHLORIDE 20 MILLIGRAM(S): 30 CAPSULE, DELAYED RELEASE ORAL at 12:20

## 2024-10-24 RX ADMIN — PANTOPRAZOLE SODIUM 40 MILLIGRAM(S): 40 TABLET, DELAYED RELEASE ORAL at 05:29

## 2024-10-24 RX ADMIN — Medication 40 MILLIGRAM(S): at 00:13

## 2024-10-24 RX ADMIN — Medication 975 MILLIGRAM(S): at 14:49

## 2024-10-24 RX ADMIN — Medication 12.5 MILLIGRAM(S): at 00:12

## 2024-10-24 RX ADMIN — Medication 128 MILLIGRAM(S): at 18:07

## 2024-10-24 RX ADMIN — Medication 12.5 MILLIGRAM(S): at 21:42

## 2024-10-24 RX ADMIN — HEPARIN SODIUM 16 UNIT(S)/HR: 10000 INJECTION INTRAVENOUS; SUBCUTANEOUS at 00:14

## 2024-10-24 RX ADMIN — Medication 2 MILLIGRAM(S): at 17:40

## 2024-10-24 RX ADMIN — Medication 100 MILLIGRAM(S): at 00:13

## 2024-10-24 RX ADMIN — PREGABALIN 100 MILLIGRAM(S): 150 CAPSULE ORAL at 21:43

## 2024-10-24 RX ADMIN — Medication 2 MILLIGRAM(S): at 02:55

## 2024-10-24 RX ADMIN — CYCLOBENZAPRINE HYDROCHLORIDE 5 MILLIGRAM(S): 30 CAPSULE, EXTENDED RELEASE ORAL at 21:43

## 2024-10-24 RX ADMIN — PREGABALIN 100 MILLIGRAM(S): 150 CAPSULE ORAL at 00:12

## 2024-10-24 RX ADMIN — Medication 2 MILLIGRAM(S): at 03:25

## 2024-10-24 RX ADMIN — PRAMIPEXOLE DIHYDROCHLORIDE 0.75 MILLIGRAM(S): 0.12 TABLET ORAL at 20:13

## 2024-10-24 RX ADMIN — MONTELUKAST SODIUM 10 MILLIGRAM(S): 10 TABLET, FILM COATED ORAL at 12:20

## 2024-10-24 NOTE — PROGRESS NOTE ADULT - ATTENDING COMMENTS
Patient seen and examined. Chart with pertinent labs and imaging reviewed.  MRI report reviewed  Patient still c/o back pain, fever, chills.  Concur with plans for IR aspiration of fluid  TTE unrevealing, at present yield on MICHAEL low. No surgical indications at present  Continue cefazolin  F/U cx  Kaushal Waterman MD  Attending Physician  [facility]  Division of Infectious Diseases  [phone]

## 2024-10-24 NOTE — PROGRESS NOTE ADULT - PROBLEM SELECTOR PLAN 3
- S/P T9-L3 open fusion with PSO/lag screw partial corpectomy/interbody with complex plastic closure (w/ Dr. Stroud and Dr. Tinajero on 9/29)   - TLSO when OOB  - Previous pain management: Tylenol 975mg ATC, Duloxetine 20mg daily, Lyrica 100mg TID , Flexeril 5mg TID PRN,     Plan  - Start hydromorphone 2 mg PO q6  - Consulted neurosug, f/u reccs

## 2024-10-24 NOTE — OCCUPATIONAL THERAPY INITIAL EVALUATION ADULT - GENERAL OBSERVATIONS, REHAB EVAL
Upon entry, patient semi-supine in bed +2 IVs +purewick +tele, +TLSO (donned at edge of bed) patient agreeable to OT eval, cleared for OT evaluation as per ARUN Rodriguez

## 2024-10-24 NOTE — PROGRESS NOTE ADULT - PROBLEM SELECTOR PLAN 1
Patient presents with sepsis with high fever, rigors, tachycardia on 10/20 found to have MSSA bacteremia.  - 10/20 Bcx MSSA, 10/21 no growth at 24hrs  - CXR with old rib fracture and small RUL infiltrate  - 10/20 +UA for Ecoli, follow urine culture  - lactic acidosis resolved, lactate 1.6 10/23  - Na 134 K 3.3 Phos 1.5  - MRI: rim-enhancing heterogeneous fluid collection suspicious for an abscess    Plan  - Neurosurg consulted and following, consulted IR for drainage and  requests cultures and Beta2 transferrin of fluid be sent  - Repeat daily Bcx  - Replete electrolytes  - c/w cefazolin 2000mg IV q8  - Consulted ID for MSSA bacteremia, f/u reccs  - Consulted neurosurg for pending spinal MRI, f/u reccs Patient presents with sepsis with high fever, rigors, tachycardia on 10/20 found to have MSSA bacteremia.  - 10/20 Bcx MSSA, 10/21 no growth at 24hrs  - CXR with old rib fracture and small RUL infiltrate  - 10/20 +UA for Ecoli, follow urine culture  - lactic acidosis resolved, lactate 1.6 10/23  - 10/23 Na 134 K 3.3 Phos 1.5  - MRI: rim-enhancing heterogeneous fluid collection suspicious for an abscess  - TTE: no evidence of vegetations    Plan  - Neurosurg consulted and following, consulted IR for drainage and requests cultures and Beta2 transferrin of fluid be sent, f/u reccs  - ID consulted. requests culture and gram stain fluid  - IR consulted, approved for paraspinal collection aspiration tomorrow 10/25 (local anesthesia only), pending beta 2 transferrin results, STAT labs in AM (cbc,coags, bmp, T&S), hold heparin gtt for 4 hours prior to procedure   - MICHAEL scheduled for 10/25, NPO at midnight  - Repeat daily Bcx  - Replete electrolytes  - c/w cefazolin 2000mg IV q8  - Consulted ID for MSSA bacteremia, f/u reccs  - Moniter WBC and fever curve Patient presents with sepsis with high fever, rigors, tachycardia on 10/20 found to have MSSA bacteremia.  - 10/20 Bcx MSSA, 10/21 no growth at 24hrs  - CXR with old rib fracture and small RUL infiltrate  - 10/20 +UA for Ecoli, follow urine culture  - lactic acidosis resolved, lactate 1.6 10/23  - 10/23 Na 134 K 3.3 Phos 1.5  - MRI: rim-enhancing heterogeneous fluid collection suspicious for an abscess  - TTE: no evidence of vegetations    Plan  - Neurosurg consulted and following, consulted IR for drainage and requests cultures and Beta2 transferrin of fluid be sent, f/u reccs  - ID consulted, requests culture and gram stain fluid  - IR consulted, approved for paraspinal collection aspiration tomorrow 10/25 (local anesthesia only), pending beta 2 transferrin results, STAT labs in AM (cbc,coags, bmp, T&S), hold heparin gtt for 4 hours prior to procedure   - MICHAEL scheduled for 10/25, NPO at midnight  - Repeat daily Bcx  - Continue to monitor and replete electrolytes  - c/w cefazolin 2000mg IV q8  - Monitor WBC and fever curve

## 2024-10-24 NOTE — OCCUPATIONAL THERAPY INITIAL EVALUATION ADULT - BALANCE TRAINING, PT EVAL
GOAL: Patient will improve seated dynamic balance by 1/2 grade within 4 weeks. GOAL: Patient will improve standing dynamic balance by 1/2  grade within 4 weeks.

## 2024-10-24 NOTE — PROGRESS NOTE ADULT - ATTENDING COMMENTS
75 yo F with PMH of HTN, gastroparesis, peripheral neuropathy, multiple prior thoracolumbar spine surgeries for congenital scoliosis presents with back pain s/p mechanical fall from sitting. Found with T12 three column spine fracture-malalignment with fracture extending to the adjacent right 12th posterior rib and left T12-L1 facet joint s/p T9-L3 fusion on 9/29 post-op course c/b recurrent brief Afib discharged to acute rehab where she had RRT 2/2 to sepsis, BCX + MSSA tx to [facility] for further zeinab      #Sepsis 2/2 to MSSA bacteremia  # Paraspinal collection  #Paroxysmal atrial fibrillation  #HTN  # Depression  # Acute on Chronic back pain      - MRI T/L spine 8.0 x 7.0 x 3.0 (CC x TV x AP) rim-enhancing heterogeneous fluid collection in the dorsal paraspinal through superficial subcutaneous tissues at the T7-L4 levels; 8.0 x 7.0 x 3.0 (CC x TV x AP) rim-enhancing heterogeneous fluid collection in the dorsal paraspinal through   superficial subcutaneous tissues at the T7-L4 levels  -plan for IR guided aspiration of paraspinal fluid collection 10/25, will send for B2 transferrin  - c/w Ancef 2gm q8hr, repeat BCX 10/23 pending,   - likely source of sepsis is MSSA bacteremia 4/4 +_ MSSA 10/20, less likely UTI- UCX with E coli and Klebsiella but patient asymptomatic- PCN allergy start IV ancef  - TTE 10/23- no vegetations, plan for MICHAEL  - NSG c/s appreciated  - also found to be back in a fib- ekg confirmed- tele monitoring- CHADSVASC is 5- hep gtt- cards c/s  - c/w hydromoprhone 2mg PO q6hr for mod pain, 4mg for severe pain q6hr, senna, miralax for bowel regimen  - restart home  BP meds\ 75 yo F with PMH of HTN, gastroparesis, peripheral neuropathy, multiple prior thoracolumbar spine surgeries for congenital scoliosis presents with back pain s/p mechanical fall from sitting. Found with T12 three column spine fracture-malalignment with fracture extending to the adjacent right 12th posterior rib and left T12-L1 facet joint s/p T9-L3 fusion on 9/29 post-op course c/b recurrent brief Afib discharged to acute rehab where she had RRT 2/2 to sepsis, BCX + MSSA tx to [facility] for further zeniab      #Sepsis 2/2 to MSSA bacteremia  # Paraspinal collection  #Paroxysmal atrial fibrillation  #HTN  # Depression  # Acute on Chronic back pain      - MRI T/L spine 8.0 x 7.0 x 3.0 (CC x TV x AP) rim-enhancing heterogeneous fluid collection in the dorsal paraspinal through superficial subcutaneous tissues at the T7-L4 levels; 8.0 x 7.0 x 3.0 (CC x TV x AP) rim-enhancing heterogeneous fluid collection in the dorsal paraspinal through   superficial subcutaneous tissues at the T7-L4 levels  -plan for IR guided aspiration of paraspinal fluid collection 10/25, will send for B2 transferrin and will determine need for drainage after   - c/w Ancef 2gm q8hr, repeat BCX 10/23 pending,   - likely source of sepsis is MSSA bacteremia is paraspinal collection 4/4 +MSSA 10/20, less likely UTI- UCX with E coli and Klebsiella but patient asymptomatic  - TTE 10/23- no vegetations, plan for MICHAEL  - NSG c/s appreciated  - also found to be back in a fib- ekg confirmed- tele monitoring- CHADSVASC is 5- hep gtt- cards c/s  - c/w hydromoprhone 2mg PO q6hr for mod pain, 4mg for severe pain q6hr, senna, miralax for bowel regimen  - restart home  BP meds

## 2024-10-24 NOTE — PROGRESS NOTE ADULT - PROBLEM SELECTOR PLAN 5
- S/p PRBCs intraoperatively   - Hb stable with no evidence of active bleed at this time   - 10/23 Hb 8.8 Hct 29.7    Plan  - Monitor H/H - S/p PRBCs intraoperatively   - Hb stable with no evidence of active bleed at this time   - 10/23 Hb 8.8 Hct 29.7  - 10/24 Hb 7.7 Hct 24.9    Plan  - Monitor H/H

## 2024-10-24 NOTE — PROGRESS NOTE ADULT - ASSESSMENT
-no acute neurosurgical intervention  -f/u BCx, 10/21 NGTD   -heparin gtt ok for afib, recommend low PTT goal 50-70  -no objection to MR T/L spine w/w/o (hardware suppression protocol), though diagnostic yield may be lower as <1 month postop  -will continue to follow pending imaging  -if no clear compressive fluid collection on MR ok to d/c to AR w/PICC for IV ABX

## 2024-10-24 NOTE — PROGRESS NOTE ADULT - TIME BILLING
- Ordering, reviewing, and interpreting labs, testing, and imaging.  - Independently obtaining a review of systems and performing a physical exam  - Reviewing consultant documentation/recommendations in addition to discussing plan of care with consultants.  - Counselling and educating patient and family regarding interpretation of aforementioned items and plan of care. - Ordering, reviewing, and interpreting labs, testing, and imaging.  - Independently obtaining a review of systems and performing a physical exam  - Reviewing consultant documentation/recommendations in addition to discussing plan of care with consultants.  - Counselling and educating patient and family regarding interpretation of aforementioned items and plan of care..

## 2024-10-24 NOTE — CONSULT NOTE ADULT - SUBJECTIVE AND OBJECTIVE BOX
Patient is a 74y old  Female who presents with a chief complaint of Bacteremia (24 Oct 2024 08:15)    Admission HPI:  74-year-old female patient with past medical history of OA, HTN, gastroparesis, peripheral neuropathy, multiple prior thoracolumbar spine surgeries for congenital scoliosis [place] >[place] w/ removal of hardware (1991) who presented to [facility] on 9/28/24 with back pain and left hip pain s/p mechanical fall. CT imaging was performed and reported a T12 three column spine fracture-malalignment with fracture extending to the adjacent right 12th posterior rib and left T12-L1 facet joint and acute, displaced fracture of the left eighth lateral rib. Chronic appearing bilateral rib deformities. Surgical management was recommended and she is S/P T9-L3 open fusion with PSO/lag screw partial corpectomy/interbody by Dr. Stroud, Neurosurgeon with complex Plastic Surgery closure by Dr. Elias on 9/29/24. Post op course c/b CSK leak s/p repair, new onset paroxsymal Afib with RVR,  increased pain and drainage from surgical incision, development of pleural effusions, symptomatic orthostatic hypotension, urinary retention, post op anemia, and chest pain with spontaneous resolution. Cardiac work up negative. Patient was evaluated by PM&R and therapy for functional deficits, gait/ADL impairments and acute rehabilitation was recommended. Patient was cleared for discharge to [facility] IRF on 10/16/24. (17 Oct 2024 10:26)  Rehab course significant for change in mental status, rrt called.     Patient was febrile to 103, tachycardic, and subjective fever/chills. Sepsis w/up initiated and found to have small right upper lobe infiltrate, UTI, and MSSA bacteremia. CTH negative. Pending CT spine. Patient remains febrile despite tylenol and nonpharmacologic cooling measures. Patient otherwise asymptomatic. Hospitalist and ID consulted at [facility] made aware and per G+ bacteremia guidelines, recommending completing infectious workup, which includes MICHAEL that is not available at [facility] location. Patient also requires PICC line placement for long term ABx. Transfer request initiated from [facility] to [facility] for MICHAEL and completion of bacteremia workup/treatment. All other medical co-morbidities were stable. Discharge instructions were discussed with patient and family. All questions answered and all concerns addressed. Patient was deemed medically stable for discharge to [facility] medicine on 10/21.    (23 Oct 2024 06:39)    Interval History:  Patient re-admitted for work -up.  Being monitored by neurosx for possible IR drainage.    REVIEW OF SYSTEMS: No chest pain, shortness of breath, nausea, vomiting or diarhea; other ROS neg     PAST MEDICAL & SURGICAL HISTORY  H/O seasonal allergies    History of pulmonary embolism    Restless leg syndrome    GERD (gastroesophageal reflux disease)    HTN (hypertension)    OA (osteoarthritis)    Fungal infection of skin    H/O scoliosis    Essential hypertension    Depression    Osteoporosis    Right hip pain    2019 novel coronavirus disease (COVID-19)    Hiatal hernia    History of chronic pain    Peripheral neuropathy    S/P repair of paraesophageal hernia    S/P spinal fusion    Scoliosis    S/P spinal surgery    Removal of pin, plate, abigail, or screw    S/P hysterectomy    S/P hip replacement    S/P shoulder surgery    S/P dilatation and curettage    H/O arthroscopy of knee    H/O total knee replacement, right    S/P cataract surgery    History of bilateral hip replacements    FUNCTIONAL HISTORY:   Was at [facility].  Prior to that Lives w son in elevator apartment and Independent    CURRENT FUNCTIONAL STATUS:  Mod A transfers and gait    FAMILY HISTORY   Family history of abdominal aortic aneurysm (AAA) (Mother)    MEDICATIONS   acetaminophen     Tablet .. 975 milliGRAM(s) Oral every 8 hours  atorvastatin 40 milliGRAM(s) Oral at bedtime  ceFAZolin   IVPB 2000 milliGRAM(s) IV Intermittent every 8 hours  cyclobenzaprine 5 milliGRAM(s) Oral three times a day PRN  DULoxetine 20 milliGRAM(s) Oral daily  ergocalciferol 55669 Unit(s) Oral <User Schedule>  heparin   Injectable 3500 Unit(s) IV Push every 6 hours PRN  heparin   Injectable 7000 Unit(s) IV Push every 6 hours PRN  heparin  Infusion 1700 Unit(s)/Hr IV Continuous <Continuous>  HYDROmorphone   Tablet 2 milliGRAM(s) Oral every 6 hours PRN  melatonin 3 milliGRAM(s) Oral at bedtime  metoprolol tartrate 12.5 milliGRAM(s) Oral every 12 hours  montelukast 10 milliGRAM(s) Oral daily  multivitamin 1 Tablet(s) Oral daily  pantoprazole    Tablet 40 milliGRAM(s) Oral before breakfast  polyethylene glycol 3350 17 Gram(s) Oral two times a day  pramipexole 0.75 milliGRAM(s) Oral at bedtime  pregabalin 100 milliGRAM(s) Oral every 8 hours  senna 2 Tablet(s) Oral at bedtime    ALLERGIES  erythromycin (Stomach Upset; Vomiting; Nausea)  Dilantin (Urticaria)  penicillins (Urticaria)    VITALS  T(C): 36.7 (10-24-24 @ 08:55), Max: 38.6 (10-24-24 @ 02:12)  HR: 90 (10-24-24 @ 08:55) (87 - 118)  BP: 126/68 (10-24-24 @ 08:55) (109/69 - 176/71)  RR: 18 (10-24-24 @ 08:55) (18 - 18)  SpO2: 96% (10-24-24 @ 08:55) (93% - 97%)  Wt(kg): --    PHYSICAL EXAM  Constitutional - NAD, Comfortable  HEENT - NCAT, EOMI  Neck - Supple  Chest - No distress, no use of accessory muscles for respiration  Cardiovascular -Well perfused  Abdomen - BS+, Soft, NTND  Extremities - No C/C/E, No calf tenderness   Neurologic Exam -                    Cognitive - Awake, Alert, AAO to self, place, date, year, situation     Communication - Fluent, No dysarthria, no aphasia     Cranial Nerves - CN 2-12 intact     Motor - No focal deficits      Sensory - Intact to LT     Reflexes - DTR Intact, No primitive reflexive  Psychiatric - Mood stable, Affect WNL    RECENT LABS/IMAGING  CBC Full  -  ( 24 Oct 2024 06:23 )  WBC Count : 7.81 K/uL  RBC Count : 2.81 M/uL  Hemoglobin : 7.7 g/dL  Hematocrit : 24.9 %  Platelet Count - Automated : 332 K/uL  Mean Cell Volume : 88.6 fl  Mean Cell Hemoglobin : 27.4 pg  Mean Cell Hemoglobin Concentration : 30.9 gm/dL  Auto Neutrophil # : 5.75 K/uL  Auto Lymphocyte # : 1.26 K/uL  Auto Monocyte # : 0.62 K/uL  Auto Eosinophil # : 0.09 K/uL  Auto Basophil # : 0.03 K/uL  Auto Neutrophil % : 73.6 %  Auto Lymphocyte % : 16.1 %  Auto Monocyte % : 7.9 %  Auto Eosinophil % : 1.2 %  Auto Basophil % : 0.4 %    10-24    137  |  103  |  16  ----------------------------<  116[H]  3.5   |  21[L]  |  0.59    Ca    8.1[L]      24 Oct 2024 06:27  Phos  2.1     10-24  Mg     1.8     10-24    TPro  5.7[L]  /  Alb  3.0[L]  /  TBili  0.3  /  DBili  x   /  AST  29  /  ALT  38  /  AlkPhos  239[H]  10-24    Urinalysis Basic - ( 24 Oct 2024 06:27 )    Color: x / Appearance: x / SG: x / pH: x  Gluc: 116 mg/dL / Ketone: x  / Bili: x / Urobili: x   Blood: x / Protein: x / Nitrite: x   Leuk Esterase: x / RBC: x / WBC x   Sq Epi: x / Non Sq Epi: x / Bacteria: x    Impression:   73 yo with functional deficits secondary to diagnosis of s/p thoracolumbar fusion readmitted with wound drainage/bacteremia    Plan:  PT- ROM, Bed Mob, Transfers, Amb w AD and bracing as needed  OT- ADLs, bracing  Prec- Falls, Cardiac  DVT Prophylaxis - On Heparin  Skin- Turn q2 h  Dispo-     Total time taken to review relevant records and imaging results, examine patient, write note, and, when applicable, discuss case with patient, family, , resident, medical student and other medical providers:     [  ] 40 minutes (79775)  [  ] 55 minutes ([id number])  [  ] 75 minutes ([id number])    [  ] 25 minutes ([id number])  [  ] 35 minutes ([id number])  [  ] 50 minutes ([id number])           Patient is a 74y old  Female who presents with a chief complaint of Bacteremia (24 Oct 2024 08:15)    Admission HPI:  74-year-old female patient with past medical history of OA, HTN, gastroparesis, peripheral neuropathy, multiple prior thoracolumbar spine surgeries for congenital scoliosis [place] >[place] of [place] w/ removal of hardware (1991) who presented to [facility] on 9/28/24 with back pain and left hip pain s/p mechanical fall. CT imaging was performed and reported a T12 three column spine fracture-malalignment with fracture extending to the adjacent right 12th posterior rib and left T12-L1 facet joint and acute, displaced fracture of the left eighth lateral rib. Chronic appearing bilateral rib deformities. Surgical management was recommended and she is S/P T9-L3 open fusion with PSO/lag screw partial corpectomy/interbody by Dr. Stroud, Neurosurgeon with complex Plastic Surgery closure by Dr. Elias on 9/29/24. Post op course c/b CSK leak s/p repair, new onset paroxsymal Afib with RVR,  increased pain and drainage from surgical incision, development of pleural effusions, symptomatic orthostatic hypotension, urinary retention, post op anemia, and chest pain with spontaneous resolution. Cardiac work up negative. Patient was evaluated by PM&R and therapy for functional deficits, gait/ADL impairments and acute rehabilitation was recommended. Patient was cleared for discharge to [facility] IRF on 10/16/24. (17 Oct 2024 10:26)  Rehab course significant for change in mental status, rrt called.     Patient was febrile to 103, tachycardic, and subjective fever/chills. Sepsis w/up initiated and found to have small right upper lobe infiltrate, UTI, and MSSA bacteremia. CTH negative. Pending CT spine. Patient remains febrile despite tylenol and nonpharmacologic cooling measures. Patient otherwise asymptomatic. Hospitalist and ID consulted at [facility] made aware and per G+ bacteremia guidelines, recommending completing infectious workup, which includes MICHAEL that is not available at [facility] location. Patient also requires PICC line placement for long term ABx. Transfer request initiated from [facility] to [facility] for MICHAEL and completion of bacteremia workup/treatment. All other medical co-morbidities were stable. Discharge instructions were discussed with patient and family. All questions answered and all concerns addressed. Patient was deemed medically stable for discharge to [facility] medicine on 10/21.    (23 Oct 2024 06:39)    Interval History:  Patient re-admitted for work -up.  Being monitored by neurosx for possible IR drainage.    REVIEW OF SYSTEMS: + weakness, + difficulty walking, No chest pain, shortness of breath, nausea, vomiting or diarhea; other ROS neg     PAST MEDICAL & SURGICAL HISTORY  H/O seasonal allergies    History of pulmonary embolism    Restless leg syndrome    GERD (gastroesophageal reflux disease)    HTN (hypertension)    OA (osteoarthritis)    Fungal infection of skin    H/O scoliosis    Essential hypertension    Depression    Osteoporosis    Right hip pain    2019 novel coronavirus disease (COVID-19)    Hiatal hernia    History of chronic pain    Peripheral neuropathy    S/P repair of paraesophageal hernia    S/P spinal fusion    Scoliosis    S/P spinal surgery    Removal of pin, plate, abigail, or screw    S/P hysterectomy    S/P hip replacement    S/P shoulder surgery    S/P dilatation and curettage    H/O arthroscopy of knee    H/O total knee replacement, right    S/P cataract surgery    History of bilateral hip replacements    FUNCTIONAL HISTORY:   Was at [facility].  Prior to that Lives w son in elevator apartment and Independent    CURRENT FUNCTIONAL STATUS:  Mod A transfers and gait    FAMILY HISTORY   Family history of abdominal aortic aneurysm (AAA) (Mother)    MEDICATIONS   acetaminophen     Tablet .. 975 milliGRAM(s) Oral every 8 hours  atorvastatin 40 milliGRAM(s) Oral at bedtime  ceFAZolin   IVPB 2000 milliGRAM(s) IV Intermittent every 8 hours  cyclobenzaprine 5 milliGRAM(s) Oral three times a day PRN  DULoxetine 20 milliGRAM(s) Oral daily  ergocalciferol 80277 Unit(s) Oral <User Schedule>  heparin   Injectable 3500 Unit(s) IV Push every 6 hours PRN  heparin   Injectable 7000 Unit(s) IV Push every 6 hours PRN  heparin  Infusion 1700 Unit(s)/Hr IV Continuous <Continuous>  HYDROmorphone   Tablet 2 milliGRAM(s) Oral every 6 hours PRN  melatonin 3 milliGRAM(s) Oral at bedtime  metoprolol tartrate 12.5 milliGRAM(s) Oral every 12 hours  montelukast 10 milliGRAM(s) Oral daily  multivitamin 1 Tablet(s) Oral daily  pantoprazole    Tablet 40 milliGRAM(s) Oral before breakfast  polyethylene glycol 3350 17 Gram(s) Oral two times a day  pramipexole 0.75 milliGRAM(s) Oral at bedtime  pregabalin 100 milliGRAM(s) Oral every 8 hours  senna 2 Tablet(s) Oral at bedtime    ALLERGIES  erythromycin (Stomach Upset; Vomiting; Nausea)  Dilantin (Urticaria)  penicillins (Urticaria)    VITALS  T(C): 36.7 (10-24-24 @ 08:55), Max: 38.6 (10-24-24 @ 02:12)  HR: 90 (10-24-24 @ 08:55) (87 - 118)  BP: 126/68 (10-24-24 @ 08:55) (109/69 - 176/71)  RR: 18 (10-24-24 @ 08:55) (18 - 18)  SpO2: 96% (10-24-24 @ 08:55) (93% - 97%)  Wt(kg): --    PHYSICAL EXAM  Constitutional - NAD, Comfortable  HEENT - NCAT, EOMI  Neck - Supple  Chest - No distress, no use of accessory muscles for respiration  Cardiovascular -Well perfused  Abdomen - BS+, Soft, NTND  Extremities - No C/C/E, No calf tenderness   Neurologic Exam - AAO x 3, Motor 4+/5 bl LEs, 5/5 bl UEs, Sensation intact, no clonus    Psychiatric - Mood stable, Affect WNL    RECENT LABS/IMAGING  CBC Full  -  ( 24 Oct 2024 06:23 )  WBC Count : 7.81 K/uL  RBC Count : 2.81 M/uL  Hemoglobin : 7.7 g/dL  Hematocrit : 24.9 %  Platelet Count - Automated : 332 K/uL  Mean Cell Volume : 88.6 fl  Mean Cell Hemoglobin : 27.4 pg  Mean Cell Hemoglobin Concentration : 30.9 gm/dL  Auto Neutrophil # : 5.75 K/uL  Auto Lymphocyte # : 1.26 K/uL  Auto Monocyte # : 0.62 K/uL  Auto Eosinophil # : 0.09 K/uL  Auto Basophil # : 0.03 K/uL  Auto Neutrophil % : 73.6 %  Auto Lymphocyte % : 16.1 %  Auto Monocyte % : 7.9 %  Auto Eosinophil % : 1.2 %  Auto Basophil % : 0.4 %    10-24    137  |  103  |  16  ----------------------------<  116[H]  3.5   |  21[L]  |  0.59    Ca    8.1[L]      24 Oct 2024 06:27  Phos  2.1     10-24  Mg     1.8     10-24    TPro  5.7[L]  /  Alb  3.0[L]  /  TBili  0.3  /  DBili  x   /  AST  29  /  ALT  38  /  AlkPhos  239[H]  10-24    Urinalysis Basic - ( 24 Oct 2024 06:27 )    Color: x / Appearance: x / SG: x / pH: x  Gluc: 116 mg/dL / Ketone: x  / Bili: x / Urobili: x   Blood: x / Protein: x / Nitrite: x   Leuk Esterase: x / RBC: x / WBC x   Sq Epi: x / Non Sq Epi: x / Bacteria: x    Impression:   75 yo with functional deficits secondary to diagnosis of s/p thoracolumbar fusion readmitted with wound drainage/bacteremia    Plan:  Continue w/u and management per Neurosx and ID  PT- ROM, Bed Mob, Transfers, Amb w AD and bracing as needed  OT- ADLs, bracing  Prec- Falls, Cardiac  Monitor anemia  DVT Prophylaxis - On Heparin  Skin- Turn q2 h  Dispo- Acute Rehab- patient requires active and ongoing therapeutic interventions of multiple disciplines and can tolerate and benefit from 3 hours of intensive therapies x 2-4wks depending on progress at rehabilitation facility. Can actively participate and benefit from  an intensive rehabilitation program. Requires supervision by a rehabilitation physician and a coordinated interdisciplinary approach to providing rehabilitation.     Total time taken to review relevant records and imaging results, examine patient, write note, and, when applicable, discuss case with patient, family, , resident, medical student and other medical providers:     [  ] 40 minutes (99264)  [X] 55 minutes ([id number])  [  ] 75 minutes (63726)    [  ] 25 minutes (24628)  [  ] 35 minutes (53615)  [  ] 50 minutes (09611)

## 2024-10-24 NOTE — PROGRESS NOTE ADULT - ASSESSMENT
74-year-old female patient with past medical history of OA, HTN, gastroparesis, peripheral neuropathy, multiple prior thoracolumbar spine surgeries for congenital scoliosis done >[place] ago [place] of [place] w/ removal of hardware (1991) and recent fall with T11-T12 fracture, s/p thoracolumbar posterior fusion for t spine fx 9/29, c/b post op csf leak, sent to rehab on 10/17, c/b sepsis with high fever, rigors, tachycardia on 10/20 found to have mssa bacteremia. CT spine no report of collection. Urine +ve E coli, transferred back to [facility] for MICHAEL, advanced spine imaging, and further management of MSSA bacteremia.

## 2024-10-24 NOTE — PROGRESS NOTE ADULT - PROBLEM SELECTOR PLAN 4
- 10/1 TTE: EF 61%  - S/P 1L IVF bolus (10/13) for symptomatic hypotension  - 10/23 /66    Plan  - c/w atorvastatin 40mg PO qd  - c/w HOLD Losartan 100 mg daily and Chlorthalidone 25 mg daily (restart if SBP is consistently above 140 per cardio)  - HOLD midodrine 7.5 mg AM

## 2024-10-24 NOTE — OCCUPATIONAL THERAPY INITIAL EVALUATION ADULT - PERTINENT HX OF CURRENT PROBLEM, REHAB EVAL
74-year-old female patient with past medical history of OA, HTN, gastroparesis, peripheral neuropathy, multiple prior thoracolumbar spine surgeries for congenital scoliosis done >[place] ago [place] of [place] w/ removal of hardware (1991) who presented to [facility] on 9/28/24 with back pain and left hip pain s/p mechanical fall. CT imaging was performed and reported a T12 three column spine fracture-malalignment with fracture extending to the adjacent right 12th posterior rib and left T12-L1 facet joint and acute, displaced fracture of the left eighth lateral rib. Chronic appearing bilateral rib deformities. Surgical management was recommended and she is S/P T9-L3 open fusion with PSO/lag screw partial corpectomy/interbody by Dr. Stroud, Neurosurgeon with complex Plastic Surgery closure by Dr. Elias on 9/29/24. Post op course c/b CSK leak s/p repair, new onset paroxsymal Afib with RVR,  increased pain and drainage from surgical incision, development of pleural effusions, symptomatic orthostatic hypotension, urinary retention, post op anemia, and chest pain with spontaneous resolution. Cardiac work up negative. Patient was evaluated by PM&R and therapy for functional deficits, gait/ADL impairments and acute rehabilitation was recommended. Patient was cleared for discharge to [facility] IRF on 10/16/24. (17 Oct 2024 10:26)  Rehab course significant for change in mental status, rrt called.   Patient was febrile to 103, tachycardic, and subjective fever/chills. Sepsis w/up initiated and found to have small right upper lobe infiltrate, UTI, and MSSA bacteremia. CTH negative. Pending CT spine. Patient remains febrile despite tylenol and nonpharmacologic cooling measures. Patient otherwise asymptomatic. Hospitalist and ID consulted at [place] made aware and per G+ bacteremia guidelines, recommending completing infectious workup, which includes MICHAEL that is not available at [place] location. Patient also requires PICC line placement for long term ABx. Transfer request initiated from [place] [facility] to [facility] for MICHAEL and completion of bacteremia workup/treatment.

## 2024-10-24 NOTE — PROGRESS NOTE ADULT - SUBJECTIVE AND OBJECTIVE BOX
Exam stable this AM, incision site remains cdi w bogginess appreciated under the rostral end of the incision

## 2024-10-24 NOTE — PROGRESS NOTE ADULT - PROBLEM SELECTOR PLAN 2
- 10/7 cardiology consult for Afib, patient converted back to SR. if AF is persistent will need AC  - 10/23 ECG and exam notable for irregular rate and rhythm    Plan  - Continue to monitor on telemetry  - c/w Metoprolol to 12.5 BID  - Inc heparin gtt for anticoagulation  - Consulted cardiology, rec to replete electrolytes and treat sepsis before addressing Afib, f/u reccs - 10/7 cardiology consult for Afib, patient converted back to SR. if AF is persistent will need AC  - 10/23 ECG and exam notable for irregular rate and rhythm    Plan  - Continue to monitor on telemetry  - c/w Metoprolol to 12.5 BID  - Inc heparin gtt  for anticoagulation  - Consulted cardiology, rec to replete electrolytes and treat sepsis before addressing Afib, f/u reccs - 10/7 cardiology consult for Afib, patient converted back to SR. if AF is persistent will need AC  - 10/23 ECG and exam notable for irregular rate and rhythm    Plan  - Continue to monitor on telemetry  - c/w Metoprolol to 12.5 BID  - Inc heparin gtt 3500mg IV q6 for anticoagulation  - Consulted cardiology, rec to replete electrolytes and treat sepsis before addressing Afib, f/u reccs

## 2024-10-24 NOTE — PROGRESS NOTE ADULT - PROBLEM SELECTOR PLAN 11
- c/w pantoprazole 40mg PO - Stop pantoprazole 40mg PO, c/w home vonoprazan 10mg and erythromycin 2.5mL oral fluid

## 2024-10-24 NOTE — OCCUPATIONAL THERAPY INITIAL EVALUATION ADULT - NSOTDISCHREC_GEN_A_CORE
Pt to benefit from 3+ hours of skilled therapy each day to improve functioning in ADL and functional mobility, and return to previous level of functioning./Acute Inpatient Rehab

## 2024-10-24 NOTE — PROVIDER CONTACT NOTE (OTHER) - ASSESSMENT
A&OX4. No complains of pain, discomfort, chest pain, SOB, and palpitations. VS: /71, , O2 94% on room air, Temp 101.4.

## 2024-10-24 NOTE — PROGRESS NOTE ADULT - ASSESSMENT
THESE ARE PRELIM RECOMMENDATIONS     Patient is a 74-year-old female patient with past medical history of OA, HTN, gastroparesis, peripheral neuropathy, multiple prior thoracolumbar spine surgeries for congenital scoliosis [place] >[place] w/ removal of hardware (1991) who presented to [facility] on 9/28/24 with back pain and left hip pain s/p mechanical fall. CT imaging was performed and reported a T12 three column spine fracture-malalignment with fracture extending to the adjacent right 12th posterior rib and left T12-L1 facet joint and acute, displaced fracture of the left eighth lateral rib. Chronic appearing bilateral rib deformities. Surgical management was recommended and she is S/P T9-L3 open fusion with PSO/lag screw partial corpectomy/interbody by Dr. Stroud, Neurosurgeon with complex Plastic Surgery closure by Dr. Elias on 9/29/24. Post op course c/b CSF leak s/p repair, new onset paroxsymal Afib with RVR,  increased pain and drainage from surgical incision, development of pleural effusions, symptomatic orthostatic hypotension, urinary retention, post op anemia, and chest pain with spontaneous resolution. Cardiac work up negative. Patient was evaluated by PM&R and therapy for functional deficits, gait/ADL impairments and acute rehabilitation was recommended. Patient was cleared for discharge to [facility] IRF on 10/16/24.     Rehab course complicated by RRT on 10/20 for fever of 103.9 f, tachycardia, and AMS. Infectious workup following RRT notable for leukocytosis to 10.58, lactic acid 2.6, U/A with positive nitrites, large LE, 25 WBCs, urine culture growing klebsiella aerogenes and e. coli, and 10/20 blood cultures x 2 with MSSA. Repeat blood cultures on 10/21 with NGTD. CT head without acute pathology. CT T and L spine: Unremarkable CT scan of the thoracic spine.   No vertebral fracture is recognized. ID was consulted while at [facility] who recommended Transfer request initiated from [place] to [facility] for MICHAEL and completion of bacteremia workup/treatment.      Patient has had unchanged persistent back pain since surgery. Other than spinal hardware, patient reports she had a right knee replacement this year, two hip replacements (one 2 years ago and one 20 years ago), and right shoulder replacement 10 years ago. States none of these sites have been bothering her. Denies any prosthetic heart valves or murmurs. Clincal exam notable for fluctuance over lower L spine.     Abx  -Cefazolin 2 g IVPB q8h ( 10/23-)   -s/p Vancomycin, Aztreonam and cefepime     #MSSA bacteremia  #recent spinal surgery with hardware  #presence of other prosthetic joints   -patient continuing to spike fevers   -TTE without vegetations   -continue with Cefazolin 2 g IVPB q8h  -f/u MRI T and L spine   -f/u MICHAEL ordered by primary team   -neurosurgery consulted; appreciate recs   -f/u all culture data  -monitor WBC and fever curve     Case seen and discussed with Dr. Waterman who agrees with assessment and plan. Note not final until attending addendum.    Patient is a 74-year-old female patient with past medical history of OA, HTN, gastroparesis, peripheral neuropathy, multiple prior thoracolumbar spine surgeries for congenital scoliosis done >[place] w/ removal of hardware (1991) who presented to [facility] on 9/28/24 with back pain and left hip pain s/p mechanical fall. CT imaging was performed and reported a T12 three column spine fracture-malalignment with fracture extending to the adjacent right 12th posterior rib and left T12-L1 facet joint and acute, displaced fracture of the left eighth lateral rib. Chronic appearing bilateral rib deformities. Surgical management was recommended and she is S/P T9-L3 open fusion with PSO/lag screw partial corpectomy/interbody by Dr. Stroud, Neurosurgeon with complex Plastic Surgery closure by Dr. Elias on 9/29/24. Post op course c/b CSF leak s/p repair, new onset paroxsymal Afib with RVR,  increased pain and drainage from surgical incision, development of pleural effusions, symptomatic orthostatic hypotension, urinary retention, post op anemia, and chest pain with spontaneous resolution. Cardiac work up negative. Patient was evaluated by PM&R and therapy for functional deficits, gait/ADL impairments and acute rehabilitation was recommended. Patient was cleared for discharge to [facility] IRF on 10/16/24.     Rehab course complicated by RRT on 10/20 for fever of 103.9 f, tachycardia, and AMS. Infectious workup following RRT notable for leukocytosis to 10.58, lactic acid 2.6, U/A with positive nitrites, large LE, 25 WBCs, urine culture growing klebsiella aerogenes and e. coli, and 10/20 blood cultures x 2 with MSSA. Repeat blood cultures on 10/21 with NGTD. CT head without acute pathology. CT T and L spine: Unremarkable CT scan of the thoracic spine.   No vertebral fracture is recognized. ID was consulted while at [facility] who recommended Transfer request initiated from [place] to [facility] for MICHAEL and completion of bacteremia workup/treatment.      Patient has had unchanged persistent back pain since surgery. Other than spinal hardware, patient reports she had a right knee replacement this year, two hip replacements (one 2 years ago and one 20 years ago), and right shoulder replacement 10 years ago. States none of these sites have been bothering her. Denies any prosthetic heart valves or murmurs. Clincal exam notable for fluctuance over lower L spine.  MRI imaging performed now showing 28.0 x 7.0 x 3.0 (CC x TV x AP) rim-enhancing heterogeneous fluid collection in the dorsal paraspinal through superficial subcutaneous tissues at the T7-L4 levels    Abx  -Cefazolin 2 g IVPB q8h ( 10/23-)   -s/p Vancomycin, Aztreonam and cefepime     #MSSA bacteremia  #recent spinal surgery with hardware  #presence of other prosthetic joints   #rim enhancing fluid collection in T7-L4 region  -patient continuing to spike fevers   -TTE without vegetations   -continue with Cefazolin 2 g IVPB q8h  -agree with with neurosurgery for IR evaluation for fluid aspiration. Please sent fluid for culture and gram stain.   -f/u MICHAEL ordered by primary team   -f/u all culture data  -monitor WBC and fever curve     Case seen and discussed with Dr. Waterman who agrees with assessment and plan. Note not final until attending addendum.

## 2024-10-24 NOTE — PROGRESS NOTE ADULT - SUBJECTIVE AND OBJECTIVE BOX
****Delores Jane MS3****    CHIEF COMPLAINT:    OVERNIGHT EVENTS:    INTERVAL HPI:       VITAL SIGNS:  T(F): 99.3 (10-24-24 @ 06:25)  HR: 116 (10-24-24 @ 04:13)  BP: 164/75 (10-24-24 @ 04:13)  RR: 18 (10-24-24 @ 04:13)  SpO2: 93% (10-24-24 @ 04:13)  Wt(kg): --    PHYSICAL EXAM:    Constitutional:  Eyes:  ENMT:  Neck:  Respiratory:  Cardiovascular:  Gastrointestinal:  Extremities:  Vascular:  Neurological:  Musculoskeletal:    MEDICATIONS  (STANDING):  acetaminophen     Tablet .. 975 milliGRAM(s) Oral every 8 hours  atorvastatin 40 milliGRAM(s) Oral at bedtime  ceFAZolin   IVPB 2000 milliGRAM(s) IV Intermittent every 8 hours  DULoxetine 20 milliGRAM(s) Oral daily  ergocalciferol 67526 Unit(s) Oral <User Schedule>  heparin  Infusion 1600 Unit(s)/Hr (16 mL/Hr) IV Continuous <Continuous>  melatonin 3 milliGRAM(s) Oral at bedtime  metoprolol tartrate 12.5 milliGRAM(s) Oral every 12 hours  montelukast 10 milliGRAM(s) Oral daily  multivitamin 1 Tablet(s) Oral daily  pantoprazole    Tablet 40 milliGRAM(s) Oral before breakfast  polyethylene glycol 3350 17 Gram(s) Oral two times a day  pramipexole 0.75 milliGRAM(s) Oral at bedtime  pregabalin 100 milliGRAM(s) Oral every 8 hours  senna 2 Tablet(s) Oral at bedtime    MEDICATIONS  (PRN):  cyclobenzaprine 5 milliGRAM(s) Oral three times a day PRN Muscle Spasm  heparin   Injectable 3500 Unit(s) IV Push every 6 hours PRN For aPTT between 40 - 57  heparin   Injectable 7000 Unit(s) IV Push every 6 hours PRN For aPTT less than 40  HYDROmorphone   Tablet 2 milliGRAM(s) Oral every 6 hours PRN Severe Pain (7 - 10)      Allergies    Dilantin (Urticaria)  penicillins (Urticaria)    Intolerances    erythromycin (Stomach Upset; Vomiting; Nausea)      LABS:                        7.7    7.81  )-----------( 332      ( 24 Oct 2024 06:23 )             24.9     10-24    137  |  103  |  16  ----------------------------<  116[H]  3.5   |  21[L]  |  0.59    Ca    8.1[L]      24 Oct 2024 06:27  Phos  2.1     10-24  Mg     1.8     10-24    TPro  5.7[L]  /  Alb  3.0[L]  /  TBili  0.3  /  DBili  x   /  AST  29  /  ALT  38  /  AlkPhos  239[H]  10-24    PTT - ( 24 Oct 2024 06:24 )  PTT:49.4 sec  Urinalysis Basic - ( 24 Oct 2024 06:27 )    Color: x / Appearance: x / SG: x / pH: x  Gluc: 116 mg/dL / Ketone: x  / Bili: x / Urobili: x   Blood: x / Protein: x / Nitrite: x   Leuk Esterase: x / RBC: x / WBC x   Sq Epi: x / Non Sq Epi: x / Bacteria: x        RADIOLOGY & ADDITIONAL TESTS:     ****Delores Mceligio MS3****    CHIEF COMPLAINT:  Patient is a 73 yo female who presents with sepsis with high fever, rigors, tachycardia on 10/20 found to have MSSA bacteremia.    OVERNIGHT EVENTS: Patient was febrile overnight and given Motrin with relief. Patient went into Afib at 2:50AM and spontaneously returned to sinus rhythm at 5:40AM    INTERVAL HPI:  VSS and on RA.       VITAL SIGNS:  T(F): 99.3 (10-24-24 @ 06:25)  HR: 116 (10-24-24 @ 04:13)  BP: 164/75 (10-24-24 @ 04:13)  RR: 18 (10-24-24 @ 04:13)  SpO2: 93% (10-24-24 @ 04:13)  Wt(kg): --    PHYSICAL EXAM:    Constitutional:  Eyes:  ENMT:  Neck:  Respiratory:  Cardiovascular:  Gastrointestinal:  Extremities:  Vascular:  Neurological:  Musculoskeletal:    MEDICATIONS  (STANDING):  acetaminophen     Tablet .. 975 milliGRAM(s) Oral every 8 hours  atorvastatin 40 milliGRAM(s) Oral at bedtime  ceFAZolin   IVPB 2000 milliGRAM(s) IV Intermittent every 8 hours  DULoxetine 20 milliGRAM(s) Oral daily  ergocalciferol 80145 Unit(s) Oral <User Schedule>  heparin  Infusion 1600 Unit(s)/Hr (16 mL/Hr) IV Continuous <Continuous>  melatonin 3 milliGRAM(s) Oral at bedtime  metoprolol tartrate 12.5 milliGRAM(s) Oral every 12 hours  montelukast 10 milliGRAM(s) Oral daily  multivitamin 1 Tablet(s) Oral daily  pantoprazole    Tablet 40 milliGRAM(s) Oral before breakfast  polyethylene glycol 3350 17 Gram(s) Oral two times a day  pramipexole 0.75 milliGRAM(s) Oral at bedtime  pregabalin 100 milliGRAM(s) Oral every 8 hours  senna 2 Tablet(s) Oral at bedtime    MEDICATIONS  (PRN):  cyclobenzaprine 5 milliGRAM(s) Oral three times a day PRN Muscle Spasm  heparin   Injectable 3500 Unit(s) IV Push every 6 hours PRN For aPTT between 40 - 57  heparin   Injectable 7000 Unit(s) IV Push every 6 hours PRN For aPTT less than 40  HYDROmorphone   Tablet 2 milliGRAM(s) Oral every 6 hours PRN Severe Pain (7 - 10)      Allergies    Dilantin (Urticaria)  penicillins (Urticaria)    Intolerances    erythromycin (Stomach Upset; Vomiting; Nausea)      LABS:                        7.7    7.81  )-----------( 332      ( 24 Oct 2024 06:23 )             24.9     10-24    137  |  103  |  16  ----------------------------<  116[H]  3.5   |  21[L]  |  0.59    Ca    8.1[L]      24 Oct 2024 06:27  Phos  2.1     10-24  Mg     1.8     10-24    TPro  5.7[L]  /  Alb  3.0[L]  /  TBili  0.3  /  DBili  x   /  AST  29  /  ALT  38  /  AlkPhos  239[H]  10-24    PTT - ( 24 Oct 2024 06:24 )  PTT:49.4 sec  Urinalysis Basic - ( 24 Oct 2024 06:27 )    Color: x / Appearance: x / SG: x / pH: x  Gluc: 116 mg/dL / Ketone: x  / Bili: x / Urobili: x   Blood: x / Protein: x / Nitrite: x   Leuk Esterase: x / RBC: x / WBC x   Sq Epi: x / Non Sq Epi: x / Bacteria: x        RADIOLOGY & ADDITIONAL TESTS:     ****Delores Lara MS3****      Patient is a 75 yo female who presents with sepsis with high fever, rigors, tachycardia on 10/20 found to have MSSA bacteremia.    Interval/Overnight Events:  Patient was febrile overnight and given Motrin with relief. Patient went into Afib at 2:50AM and spontaneously returned to sinus rhythm at 5:40AM. Received MRI around 11PM and was given Ativan 2mg PO for procedural anxiety.    Subjective: Patient seen and examined at bedside. Appeared lethargic and mildly confused of about last night's events. Afebrile, VSS and on RA. Patient reports feeling well and that back and flank pain has improved since receiving Dilaudid.  Denies chills, edema, palpitations, headaches, abdominal pain. No other pertinent positives or negatives in ROS.      VITAL SIGNS:  T(F): 99.3 (10-24-24 @ 06:25)  HR: 116 (10-24-24 @ 04:13)  BP: 164/75 (10-24-24 @ 04:13)  RR: 18 (10-24-24 @ 04:13)  SpO2: 93% (10-24-24 @ 04:13)  Wt(kg): --    PHYSICAL EXAM:    GENERAL: NAD, non-toxic appearing  EYES: EOMI, PERRLA, conjunctiva and sclera clear  ENT: moist mucous membranes  CV: RRR, normal S1 and S2. No mumurs, rubs, or gallops. +JVD.   LUNGS: Clear to auscultation bilaterally. No rales, rhonchi, or wheezing.   ABDOMEN: Soft, nontender, nondistended, +bowel sounds   NEURO: AOx4, no FND  PSYCH: Appropriate affect  EXTREMITIES: No edema, cyanosis, or clubbing. 2+ peripheral pulses.  LYMPH: No lymphadenopathy noted  SKIN: No rashes or lesions      MEDICATIONS  (STANDING):  acetaminophen     Tablet .. 975 milliGRAM(s) Oral every 8 hours  atorvastatin 40 milliGRAM(s) Oral at bedtime  ceFAZolin   IVPB 2000 milliGRAM(s) IV Intermittent every 8 hours  DULoxetine 20 milliGRAM(s) Oral daily  ergocalciferol 15570 Unit(s) Oral <User Schedule>  heparin  Infusion 1600 Unit(s)/Hr (16 mL/Hr) IV Continuous <Continuous>  melatonin 3 milliGRAM(s) Oral at bedtime  metoprolol tartrate 12.5 milliGRAM(s) Oral every 12 hours  montelukast 10 milliGRAM(s) Oral daily  multivitamin 1 Tablet(s) Oral daily  pantoprazole    Tablet 40 milliGRAM(s) Oral before breakfast  polyethylene glycol 3350 17 Gram(s) Oral two times a day  pramipexole 0.75 milliGRAM(s) Oral at bedtime  pregabalin 100 milliGRAM(s) Oral every 8 hours  senna 2 Tablet(s) Oral at bedtime    MEDICATIONS  (PRN):  cyclobenzaprine 5 milliGRAM(s) Oral three times a day PRN Muscle Spasm  heparin   Injectable 3500 Unit(s) IV Push every 6 hours PRN For aPTT between 40 - 57  heparin   Injectable 7000 Unit(s) IV Push every 6 hours PRN For aPTT less than 40  HYDROmorphone   Tablet 2 milliGRAM(s) Oral every 6 hours PRN Severe Pain (7 - 10)      Allergies    Dilantin (Urticaria)  penicillins (Urticaria)    Intolerances    erythromycin (Stomach Upset; Vomiting; Nausea)      LABS:                        7.7    7.81  )-----------( 332      ( 24 Oct 2024 06:23 )             24.9     10-24    137  |  103  |  16  ----------------------------<  116[H]  3.5   |  21[L]  |  0.59    Ca    8.1[L]      24 Oct 2024 06:27  Phos  2.1     10-24  Mg     1.8     10-24    TPro  5.7[L]  /  Alb  3.0[L]  /  TBili  0.3  /  DBili  x   /  AST  29  /  ALT  38  /  AlkPhos  239[H]  10-24    PTT - ( 24 Oct 2024 06:24 )  PTT:49.4 sec  Urinalysis Basic - ( 24 Oct 2024 06:27 )    Color: x / Appearance: x / SG: x / pH: x  Gluc: 116 mg/dL / Ketone: x  / Bili: x / Urobili: x   Blood: x / Protein: x / Nitrite: x   Leuk Esterase: x / RBC: x / WBC x   Sq Epi: x / Non Sq Epi: x / Bacteria: x        RADIOLOGY & ADDITIONAL TESTS:     ****Delores Lara MS3****      Patient is a 73 yo female who presents with sepsis with high fever, rigors, tachycardia on 10/20 found to have MSSA bacteremia.    Interval/Overnight Events:  Patient was febrile overnight and given Motrin with relief. Patient went into Afib at 2:50AM and spontaneously returned to sinus rhythm at 5:40AM. Received MRI around 11PM and was given Ativan 2mg PO for procedural anxiety.    Subjective: Patient seen and examined at bedside. Appeared lethargic and mildly confused of about last night's events. Afebrile, VSS and on RA. Patient reports feeling well and that back and flank pain has improved since receiving Dilaudid.  Denies chills, edema, palpitations, headaches, abdominal pain. No other pertinent positives or negatives in ROS..      VITAL SIGNS:  T(F): 99.3 (10-24-24 @ 06:25)  HR: 116 (10-24-24 @ 04:13)  BP: 164/75 (10-24-24 @ 04:13)  RR: 18 (10-24-24 @ 04:13)  SpO2: 93% (10-24-24 @ 04:13)  Wt(kg): --    PHYSICAL EXAM:    GENERAL: NAD, non-toxic appearing  EYES: EOMI, PERRLA, conjunctiva and sclera clear  ENT: moist mucous membranes  CV: RRR, normal S1 and S2. No mumurs, rubs, or gallops. +JVD.   LUNGS: Clear to auscultation bilaterally. No rales, rhonchi, or wheezing.   ABDOMEN: Soft, nontender, nondistended, +bowel sounds   NEURO: AOx4, no FND  PSYCH: Appropriate affect  EXTREMITIES: No edema, cyanosis, or clubbing. 2+ peripheral pulses.  LYMPH: No lymphadenopathy noted  SKIN: No rashes or lesions      MEDICATIONS  (STANDING):  acetaminophen     Tablet .. 975 milliGRAM(s) Oral every 8 hours  atorvastatin 40 milliGRAM(s) Oral at bedtime  ceFAZolin   IVPB 2000 milliGRAM(s) IV Intermittent every 8 hours  DULoxetine 20 milliGRAM(s) Oral daily  ergocalciferol 69201 Unit(s) Oral <User Schedule>  heparin  Infusion 1600 Unit(s)/Hr (16 mL/Hr) IV Continuous <Continuous>  melatonin 3 milliGRAM(s) Oral at bedtime  metoprolol tartrate 12.5 milliGRAM(s) Oral every 12 hours  montelukast 10 milliGRAM(s) Oral daily  multivitamin 1 Tablet(s) Oral daily  pantoprazole    Tablet 40 milliGRAM(s) Oral before breakfast  polyethylene glycol 3350 17 Gram(s) Oral two times a day  pramipexole 0.75 milliGRAM(s) Oral at bedtime  pregabalin 100 milliGRAM(s) Oral every 8 hours  senna 2 Tablet(s) Oral at bedtime    MEDICATIONS  (PRN):  cyclobenzaprine 5 milliGRAM(s) Oral three times a day PRN Muscle Spasm  heparin   Injectable 3500 Unit(s) IV Push every 6 hours PRN For aPTT between 40 - 57  heparin   Injectable 7000 Unit(s) IV Push every 6 hours PRN For aPTT less than 40  HYDROmorphone   Tablet 2 milliGRAM(s) Oral every 6 hours PRN Severe Pain (7 - 10)      Allergies    Dilantin (Urticaria)  penicillins (Urticaria)    Intolerances    erythromycin (Stomach Upset; Vomiting; Nausea)      LABS:                        7.7    7.81  )-----------( 332      ( 24 Oct 2024 06:23 )             24.9     10-24    137  |  103  |  16  ----------------------------<  116[H]  3.5   |  21[L]  |  0.59    Ca    8.1[L]      24 Oct 2024 06:27  Phos  2.1     10-24  Mg     1.8     10-24    TPro  5.7[L]  /  Alb  3.0[L]  /  TBili  0.3  /  DBili  x   /  AST  29  /  ALT  38  /  AlkPhos  239[H]  10-24    PTT - ( 24 Oct 2024 06:24 )  PTT:49.4 sec  Urinalysis Basic - ( 24 Oct 2024 06:27 )    Color: x / Appearance: x / SG: x / pH: x  Gluc: 116 mg/dL / Ketone: x  / Bili: x / Urobili: x   Blood: x / Protein: x / Nitrite: x   Leuk Esterase: x / RBC: x / WBC x   Sq Epi: x / Non Sq Epi: x / Bacteria: x        RADIOLOGY & ADDITIONAL TESTS:     ****Delores Lara MS3****      Patient is a 75 yo female who presents with sepsis with high fever, rigors, tachycardia on 10/20 found to have MSSA bacteremia.    Interval/Overnight Events:  Patient was febrile overnight and given Motrin with relief. Patient went into Afib at 2:50AM and spontaneously returned to sinus rhythm at 5:40AM. Received MRI around 11PM and was given Ativan 2mg PO for procedural anxiety.    Subjective: Patient seen and examined at bedside. Appeared lethargic and mildly confused of about last night's events. Afebrile, VSS and on RA. Patient reports feeling well and that back and flank pain has improved since receiving Dilaudid.  Denies chills, edema, palpitations, headaches, abdominal pain. No other pertinent positives or negatives in ROS..      VITAL SIGNS:  T(F): 99.3 (10-24-24 @ 06:25)  HR: 116 (10-24-24 @ 04:13)  BP: 164/75 (10-24-24 @ 04:13)  RR: 18 (10-24-24 @ 04:13)  SpO2: 93% (10-24-24 @ 04:13)  Wt(kg): --    PHYSICAL EXAM:    GENERAL: NAD, non-toxic appearing  EYES: EOMI, PERRLA, conjunctiva and sclera clear  ENT: moist mucous membranes  CV: RRR, normal S1 and S2. No mumurs, rubs, or gallops.   LUNGS: Clear to auscultation bilaterally. No rales, rhonchi, or wheezing.   ABDOMEN: Soft, nontender, nondistended, +bowel sounds   NEURO: AOx4, no FND  PSYCH: Appropriate affect  EXTREMITIES: No edema, cyanosis, or clubbing. 2+ peripheral pulses.  LYMPH: No lymphadenopathy noted  SKIN: No rashes or lesions      MEDICATIONS  (STANDING):  acetaminophen     Tablet .. 975 milliGRAM(s) Oral every 8 hours  atorvastatin 40 milliGRAM(s) Oral at bedtime  ceFAZolin   IVPB 2000 milliGRAM(s) IV Intermittent every 8 hours  DULoxetine 20 milliGRAM(s) Oral daily  ergocalciferol 93340 Unit(s) Oral <User Schedule>  heparin  Infusion 1600 Unit(s)/Hr (16 mL/Hr) IV Continuous <Continuous>  melatonin 3 milliGRAM(s) Oral at bedtime  metoprolol tartrate 12.5 milliGRAM(s) Oral every 12 hours  montelukast 10 milliGRAM(s) Oral daily  multivitamin 1 Tablet(s) Oral daily  pantoprazole    Tablet 40 milliGRAM(s) Oral before breakfast  polyethylene glycol 3350 17 Gram(s) Oral two times a day  pramipexole 0.75 milliGRAM(s) Oral at bedtime  pregabalin 100 milliGRAM(s) Oral every 8 hours  senna 2 Tablet(s) Oral at bedtime    MEDICATIONS  (PRN):  cyclobenzaprine 5 milliGRAM(s) Oral three times a day PRN Muscle Spasm  heparin   Injectable 3500 Unit(s) IV Push every 6 hours PRN For aPTT between 40 - 57  heparin   Injectable 7000 Unit(s) IV Push every 6 hours PRN For aPTT less than 40  HYDROmorphone   Tablet 2 milliGRAM(s) Oral every 6 hours PRN Severe Pain (7 - 10)      Allergies    Dilantin (Urticaria)  penicillins (Urticaria)    Intolerances    erythromycin (Stomach Upset; Vomiting; Nausea)      LABS:                        7.7    7.81  )-----------( 332      ( 24 Oct 2024 06:23 )             24.9     10-24    137  |  103  |  16  ----------------------------<  116[H]  3.5   |  21[L]  |  0.59    Ca    8.1[L]      24 Oct 2024 06:27  Phos  2.1     10-24  Mg     1.8     10-24    TPro  5.7[L]  /  Alb  3.0[L]  /  TBili  0.3  /  DBili  x   /  AST  29  /  ALT  38  /  AlkPhos  239[H]  10-24    PTT - ( 24 Oct 2024 06:24 )  PTT:49.4 sec  Urinalysis Basic - ( 24 Oct 2024 06:27 )    Color: x / Appearance: x / SG: x / pH: x  Gluc: 116 mg/dL / Ketone: x  / Bili: x / Urobili: x   Blood: x / Protein: x / Nitrite: x   Leuk Esterase: x / RBC: x / WBC x   Sq Epi: x / Non Sq Epi: x / Bacteria: x        RADIOLOGY & ADDITIONAL TESTS: Reviewed     ****Delores Lara MS3****      Patient is a 75 yo female who presents with sepsis with high fever, rigors, tachycardia on 10/20 found to have MSSA bacteremia.    Interval/Overnight Events:  Patient was febrile overnight and given Motrin with relief. Patient went into Afib at 2:50AM and spontaneously returned to sinus rhythm at 5:40AM. Received MRI around 11PM and was given Ativan 2mg PO for procedural anxiety.    Subjective: Patient seen and examined at bedside. Appeared lethargic and mildly confused of about last night's events. Afebrile, VSS and on RA. Patient reports feeling well and that back and flank pain has improved since receiving Dilaudid.  Denies chills, edema, palpitations, headaches, abdominal pain. No other pertinent positives or negatives in ROS...      VITAL SIGNS:  T(F): 99.3 (10-24-24 @ 06:25)  HR: 116 (10-24-24 @ 04:13)  BP: 164/75 (10-24-24 @ 04:13)  RR: 18 (10-24-24 @ 04:13)  SpO2: 93% (10-24-24 @ 04:13)  Wt(kg): --    PHYSICAL EXAM:    GENERAL: NAD, non-toxic appearing  EYES: EOMI, PERRLA, conjunctiva and sclera clear  ENT: moist mucous membranes  CV: RRR, normal S1 and S2. No mumurs, rubs, or gallops.   LUNGS: Clear to auscultation bilaterally. No rales, rhonchi, or wheezing.   ABDOMEN: Soft, nontender, nondistended, +bowel sounds   NEURO: AOx4, no FND  PSYCH: Appropriate affect  EXTREMITIES: No edema, cyanosis, or clubbing. 2+ peripheral pulses.  LYMPH: No lymphadenopathy noted  SKIN: No rashes or lesions      MEDICATIONS  (STANDING):  acetaminophen     Tablet .. 975 milliGRAM(s) Oral every 8 hours  atorvastatin 40 milliGRAM(s) Oral at bedtime  ceFAZolin   IVPB 2000 milliGRAM(s) IV Intermittent every 8 hours  DULoxetine 20 milliGRAM(s) Oral daily  ergocalciferol 70306 Unit(s) Oral <User Schedule>  heparin  Infusion 1600 Unit(s)/Hr (16 mL/Hr) IV Continuous <Continuous>  melatonin 3 milliGRAM(s) Oral at bedtime  metoprolol tartrate 12.5 milliGRAM(s) Oral every 12 hours  montelukast 10 milliGRAM(s) Oral daily  multivitamin 1 Tablet(s) Oral daily  pantoprazole    Tablet 40 milliGRAM(s) Oral before breakfast  polyethylene glycol 3350 17 Gram(s) Oral two times a day  pramipexole 0.75 milliGRAM(s) Oral at bedtime  pregabalin 100 milliGRAM(s) Oral every 8 hours  senna 2 Tablet(s) Oral at bedtime    MEDICATIONS  (PRN):  cyclobenzaprine 5 milliGRAM(s) Oral three times a day PRN Muscle Spasm  heparin   Injectable 3500 Unit(s) IV Push every 6 hours PRN For aPTT between 40 - 57  heparin   Injectable 7000 Unit(s) IV Push every 6 hours PRN For aPTT less than 40  HYDROmorphone   Tablet 2 milliGRAM(s) Oral every 6 hours PRN Severe Pain (7 - 10)      Allergies    Dilantin (Urticaria)  penicillins (Urticaria)    Intolerances    erythromycin (Stomach Upset; Vomiting; Nausea)      LABS:                        7.7    7.81  )-----------( 332      ( 24 Oct 2024 06:23 )             24.9     10-24    137  |  103  |  16  ----------------------------<  116[H]  3.5   |  21[L]  |  0.59    Ca    8.1[L]      24 Oct 2024 06:27  Phos  2.1     10-24  Mg     1.8     10-24    TPro  5.7[L]  /  Alb  3.0[L]  /  TBili  0.3  /  DBili  x   /  AST  29  /  ALT  38  /  AlkPhos  239[H]  10-24    PTT - ( 24 Oct 2024 06:24 )  PTT:49.4 sec  Urinalysis Basic - ( 24 Oct 2024 06:27 )    Color: x / Appearance: x / SG: x / pH: x  Gluc: 116 mg/dL / Ketone: x  / Bili: x / Urobili: x   Blood: x / Protein: x / Nitrite: x   Leuk Esterase: x / RBC: x / WBC x   Sq Epi: x / Non Sq Epi: x / Bacteria: x        RADIOLOGY & ADDITIONAL TESTS: Reviewed

## 2024-10-24 NOTE — OCCUPATIONAL THERAPY INITIAL EVALUATION ADULT - ADDITIONAL COMMENTS
Pt recently at [facility], prior to rehab/hsopitilizations, As per patient, independent with ADLs prior to admission, ambulated independently with RW. Lives with son in co-op with elevator access. Owns commode and RW

## 2024-10-24 NOTE — PROGRESS NOTE ADULT - SUBJECTIVE AND OBJECTIVE BOX
Follow Up:    Neurosurgery consulted- recommending no acute intervention but agree with MRI imaging of spine. Patient reports she went for MRI last night; results pending. Otherwise still complaining of back pain. No urinary sx. Had 1 episode of diarrhea consisting of loose stools. No abdominal pain, nausea, vomiting.         REVIEW OF SYSTEMS  Constitutional: + fevers, No chills  Skin: No rash, no phlebitis	  Respiratory: No cough, no SOB  Cardiovascular:  No chest pain, No palpitations   Gastrointestinal: No pain, No nausea, No vomiting, + diarrhea, No constipation	  Genitourinary: No dysuria, No frequency, No hesitancy, No flank pain  MSK: No Joint pain, + back pain, No edema    Allergies  Dilantin (Urticaria)  penicillins (Urticaria)        ANTIMICROBIALS:    ceFAZolin   IVPB 2000 every 8 hours      OTHER MEDS: MEDICATIONS  (STANDING):  acetaminophen     Tablet .. 975 every 8 hours  atorvastatin 40 at bedtime  cyclobenzaprine 5 three times a day PRN  DULoxetine 20 daily  heparin   Injectable 3500 every 6 hours PRN  heparin   Injectable 7000 every 6 hours PRN  heparin  Infusion 1600 <Continuous>  HYDROmorphone   Tablet 2 every 6 hours PRN  melatonin 3 at bedtime  metoprolol tartrate 12.5 every 12 hours  montelukast 10 daily  pantoprazole    Tablet 40 before breakfast  polyethylene glycol 3350 17 two times a day  pramipexole 0.75 at bedtime  pregabalin 100 every 8 hours  senna 2 at bedtime      Vital Signs Last 24 Hrs  T(F): 99.3 (10-24-24 @ 06:25), Max: 103.9 (10-20-24 @ 12:46)    Vital Signs Last 24 Hrs  HR: 116 (10-24-24 @ 04:13) (87 - 118)  BP: 164/75 (10-24-24 @ 04:13) (109/69 - 176/71)  RR: 18 (10-24-24 @ 04:13)  SpO2: 93% (10-24-24 @ 04:13) (93% - 97%)  Wt(kg): --    EXAM:  General: Patient appears comfortable, no acute distress  HEENT: NCAT, PERRL, anicteric sclera, mucous membranes moist and intact  CV: +irregularly irregular.   Lungs: No respiratory distress, CTA b/l, no wheezing, rales or rhonchi  Abd:  BS4+, Soft, NTND, no guarding  : No suprapubic tenderness  Neuro: AAOx3. No focal deficits noted.   Ext: No cyanosis, no edema  Msk: freely moving upper and lower extremities        Labs:                        7.7    7.81  )-----------( 332      ( 24 Oct 2024 06:23 )             24.9     10-24    137  |  103  |  16  ----------------------------<  116[H]  3.5   |  21[L]  |  0.59    Ca    8.1[L]      24 Oct 2024 06:27  Phos  2.1     10-24  Mg     1.8     10-24    TPro  5.7[L]  /  Alb  3.0[L]  /  TBili  0.3  /  DBili  x   /  AST  29  /  ALT  38  /  AlkPhos  239[H]  10-24      WBC Trend:  WBC Count: 7.81 (10-24-24 @ 06:23)  WBC Count: 8.52 (10-23-24 @ 21:55)  WBC Count: 8.36 (10-23-24 @ 08:26)  WBC Count: 10.50 (10-21-24 @ 06:20)      Creatine Trend:  Creatinine: 0.59 (10-24)  Creatinine: 0.67 (10-23)  Creatinine: 0.79 (10-21)  Creatinine: 0.97 (10-20)      Liver Biochemical Testing Trend:  Alanine Aminotransferase (ALT/SGPT): 38 (10-24)  Alanine Aminotransferase (ALT/SGPT): 43 (10-23)  Alanine Aminotransferase (ALT/SGPT): 33 (10-21)  Alanine Aminotransferase (ALT/SGPT): 43 (10-20)  Alanine Aminotransferase (ALT/SGPT): 60 *H* (10-18)  Aspartate Aminotransferase (AST/SGOT): 29 (10-24-24 @ 06:27)  Aspartate Aminotransferase (AST/SGOT): 42 (10-23-24 @ 08:26)  Aspartate Aminotransferase (AST/SGOT): 23 (10-21-24 @ 06:20)  Aspartate Aminotransferase (AST/SGOT): 24 (10-20-24 @ 13:14)  Aspartate Aminotransferase (AST/SGOT): 35 (10-18-24 @ 09:12)  Bilirubin Total: 0.3 (10-24)  Bilirubin Total: 0.6 (10-23)  Bilirubin Total: 0.8 (10-21)  Bilirubin Total: 0.6 (10-20)  Bilirubin Total: 0.4 (10-18)      Trend LDH      Urinalysis Basic - ( 24 Oct 2024 06:27 )    Color: x / Appearance: x / SG: x / pH: x  Gluc: 116 mg/dL / Ketone: x  / Bili: x / Urobili: x   Blood: x / Protein: x / Nitrite: x   Leuk Esterase: x / RBC: x / WBC x   Sq Epi: x / Non Sq Epi: x / Bacteria: x        MICROBIOLOGY:  Vancomycin Level, Trough: 15.3 (10-22 @ 09:24)    MRSA PCR Result.: NotDetec (11-13-23 @ 12:51)      Culture - Blood (collected 21 Oct 2024 18:02)  Source: .Blood BLOOD  Preliminary Report:    No growth at 48 Hours    Culture - Blood (collected 21 Oct 2024 17:58)  Source: .Blood BLOOD  Preliminary Report:    No growth at 48 Hours    Culture - Urine (collected 20 Oct 2024 17:15)  Source: Clean Catch Clean Catch (Midstream)  Final Report:    >100,000 CFU/ml Escherichia coli    >100,000 CFU/ml Klebsiella aerogenes (Previously Enterobacter)  Organism: Escherichia coli  Klebsiella aerogenes (Previously Enterobacter)  Organism: Klebsiella aerogenes (Previously Enterobacter)    Sensitivities:      Method Type: TAYA      -  Amoxicillin/Clavulanic Acid: R >16/8      -  Ampicillin: R >16 These ampicillin results predict results for amoxicillin      -  Ampicillin/Sulbactam: R 8/4      -  Aztreonam: S <=4      -  Cefazolin: R >16      -  Cefepime: S <=2      -  Cefoxitin: R >16      -  Ceftriaxone: S <=1 Enterobacter cloacae, Klebsiella aerogenes, and Citrobacter freundii may develop resistance during prolonged therapy.      -  Ciprofloxacin: S <=0.25      -  Ertapenem: S <=0.5      -  Gentamicin: S <=2      -  Imipenem: S <=1      -  Levofloxacin: S <=0.5      -  Meropenem: S <=1      -  Nitrofurantoin: I 64 Should not be used to treat pyelonephritis      -  Piperacillin/Tazobactam: S <=8 Treatment with Pipercillin/Tazobactam is not recommended in severe infections casued by Klebsiella aerogenes, Enterobacter cloacae complex, and Citrobacter freundii complex.      -  Tobramycin: S <=2      -  Trimethoprim/Sulfamethoxazole: S <=0.5/9.5  Organism: Escherichia coli    Sensitivities:      Method Type: TAYA      -  Amoxicillin/Clavulanic Acid: S <=8/4      -  Ampicillin: S <=8 These ampicillin results predict results for amoxicillin      -  Ampicillin/Sulbactam: S <=4/2      -  Aztreonam: S <=4      -  Cefazolin: S <=2 For uncomplicated UTI with K. pneumoniae, E. coli, or P. mirablis: TAYA <=16 is sensitive and TAYA >=32 is resistant. This also predicts results for oral agents cefaclor, cefdinir, cefpodoxime, cefprozil, cefuroxime axetil, cephalexin and locarbef for uncomplicated UTI. Note that some isolates may be susceptible to these agents while testing resistant to cefazolin.      -  Cefepime: S <=2      -  Cefoxitin: S <=8      -  Ceftriaxone: S <=1      -  Cefuroxime: S <=4      -  Ciprofloxacin: S <=0.25      -  Ertapenem: S <=0.5      -  Gentamicin: S <=2      -  Imipenem: S <=1      -  Levofloxacin: S <=0.5      -  Meropenem: S <=1      -  Nitrofurantoin: S <=32 Should not be used to treat pyelonephritis      -  Piperacillin/Tazobactam: S <=8      -  Tobramycin: S <=2      -  Trimethoprim/Sulfamethoxazole: S <=0.5/9.5    Culture - Blood (collected 20 Oct 2024 13:14)  Source: .Blood BLOOD  Final Report:    Growth in aerobic and anaerobic bottles: Staphylococcus aureus    See previous culture [id number]    Culture - Blood (collected 20 Oct 2024 13:07)  Source: .Blood BLOOD  Final Report:    Growth in aerobic and anaerobic bottles: Staphylococcus aureus    Direct identification is available within approximately 3-5    hours either by Blood Panel Multiplexed PCR or Direct    MALDI-TOF. Details: [email or web address]  Organism: Blood Culture PCR  Staphylococcus aureus  Organism: Staphylococcus aureus    Sensitivities:      Method Type: TAYA      -  Clindamycin: R <=0.25 This isolate is presumed to be clindamycin resistant based on detection of inducible resistance. Clindamycin may still be effective in some patients.      -  Erythromycin: R >4      -  Gentamicin: S <=1 Should not be used as monotherapy      -  Oxacillin: S <=0.25 Oxacillin predicts susceptibility for dicloxacillin, methicillin, and nafcillin      -  Penicillin: R >8      -  Rifampin: S <=1 Should not be used as monotherapy      -  Tetracycline: S <=1      -  Trimethoprim/Sulfamethoxazole: S <=0.5/9.5      -  Vancomycin: S 1  Organism: Blood Culture PCR    Sensitivities:      Method Type: PCR      -  Methicillin SENSITIVE Staphylococcus aureus (MSSA): Detec Any isolate of Staphylococcus aureus from a blood culture is NOT considered a contaminant.    Culture - Urine (collected 29 Jan 2024 02:13)  Source: Clean Catch Clean Catch (Midstream)  Final Report:    >=3 organisms. Probable collection contamination.    Culture - Urine (collected 06 Oct 2023 19:53)  Source: Clean Catch Clean Catch (Midstream)  Final Report:    <10,000 CFU/mL Normal Urogenital Hoa                          COVID-19 PCR: NotDetec (10-21-24 @ 09:20)  COVID-19 PCR: NotDetec (10-17-24 @ 16:34)        C-Reactive Protein: 183 (10-21)              Lactate, Blood: 1.6 (10-23 @ 08:26)      RADIOLOGY:    ACC: [id number] EXAM:  CT LUMBAR SPINE   ORDERED BY:  SIDNEY ALVARENGA     ACC: [id number] EXAM:  CT THORACIC SPINE   ORDERED BY:  SIDNEY ALVARENGA     PROCEDURE DATE:  10/21/2024          INTERPRETATION:  CT thoracic and lumbar spine without IV contrast    CLINICAL INFORMATION:  Postoperative fever  Back pain and fusion.    TECHNIQUE:  Contiguous axial 2 mm sections were obtained through the   thoracic and lumbar spine using a single helical acquisition.     Additional 2 mm sagittal and coronal reconstructions of the spine were   obtained. These additional reformatted images were used to evaluate the   spine for alignment, vertebral fractures and the integrity of the the   posterior elements.   This scan was performed using automatic exposure   control (radiation dose reduction software) to obtain a diagnostic image   quality scan with patient dose as low as reasonably achievable.    FINDINGS:   X-rays dated 10/12/2024 available for review    Posterior fusion extends from T9 to L3 in unchanged position. A   corpectomy device is seen within T12. Laminectomies noted at T12.   Thoracic and lumbar vertebral body heights are maintained. No interval   vertebral fracture is seen. No destructive bone lesion is found.   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 Alignment is significant for straightening although   there is a step-off at the level of the T12 superior endplate due to the   presence of the fracture. There isossification of the posterior spinous   process ligament extending from the mid thoracic spine inferiorly.    Upper thoracic intervertebral disc spaces appear intact. Lower thoracic   and lumbar intervertebral disc levels are fused due to ossification.    No paraspinal mass is recognized.  Paraspinal soft tissues appear intact.      IMPRESSION:  Unremarkable CT scan of the thoracic spine.   No vertebral   fracture is recognized.    --- End of Report ---             ANGELIKA RIVERA MD; Attending Radiologist  This document has been electronically signed. Oct 21 2024  2:21PM       Follow Up:    Neurosurgery consulted- recommending no acute intervention but agree with MRI imaging of spine. Patient reports she went for MRI last night; results pending. Otherwise still complaining of back pain. No urinary sx. Had 1 episode of diarrhea consisting of loose stools. No abdominal pain, nausea, vomiting.         REVIEW OF SYSTEMS  Constitutional: + fevers, No chills  Skin: No rash, no phlebitis	  Respiratory: No cough, no SOB  Cardiovascular:  No chest pain, No palpitations   Gastrointestinal: No pain, No nausea, No vomiting, + diarrhea, No constipation	  Genitourinary: No dysuria, No frequency, No hesitancy, No flank pain  MSK: No Joint pain, + back pain, No edema    Allergies  Dilantin (Urticaria)  penicillins (Urticaria)        ANTIMICROBIALS:    ceFAZolin   IVPB 2000 every 8 hours      OTHER MEDS: MEDICATIONS  (STANDING):  acetaminophen     Tablet .. 975 every 8 hours  atorvastatin 40 at bedtime  cyclobenzaprine 5 three times a day PRN  DULoxetine 20 daily  heparin   Injectable 3500 every 6 hours PRN  heparin   Injectable 7000 every 6 hours PRN  heparin  Infusion 1600 <Continuous>  HYDROmorphone   Tablet 2 every 6 hours PRN  melatonin 3 at bedtime  metoprolol tartrate 12.5 every 12 hours  montelukast 10 daily  pantoprazole    Tablet 40 before breakfast  polyethylene glycol 3350 17 two times a day  pramipexole 0.75 at bedtime  pregabalin 100 every 8 hours  senna 2 at bedtime      Vital Signs Last 24 Hrs  T(F): 99.3 (10-24-24 @ 06:25), Max: 103.9 (10-20-24 @ 12:46)    Vital Signs Last 24 Hrs  HR: 116 (10-24-24 @ 04:13) (87 - 118)  BP: 164/75 (10-24-24 @ 04:13) (109/69 - 176/71)  RR: 18 (10-24-24 @ 04:13)  SpO2: 93% (10-24-24 @ 04:13) (93% - 97%)  Wt(kg): --    EXAM:  General: Patient appears comfortable, no acute distress  HEENT: NCAT, PERRL, anicteric sclera, mucous membranes moist and intact  CV: +irregularly irregular.   Lungs: No respiratory distress, CTA b/l, no wheezing, rales or rhonchi  Abd:  BS4+, Soft, NTND, no guarding  : No suprapubic tenderness  Neuro: AAOx3. No focal deficits noted.   Ext: No cyanosis, no edema  Msk: freely moving upper and lower extremities        Labs:                        7.7    7.81  )-----------( 332      ( 24 Oct 2024 06:23 )             24.9     10-24    137  |  103  |  16  ----------------------------<  116[H]  3.5   |  21[L]  |  0.59    Ca    8.1[L]      24 Oct 2024 06:27  Phos  2.1     10-24  Mg     1.8     10-24    TPro  5.7[L]  /  Alb  3.0[L]  /  TBili  0.3  /  DBili  x   /  AST  29  /  ALT  38  /  AlkPhos  239[H]  10-24      WBC Trend:  WBC Count: 7.81 (10-24-24 @ 06:23)  WBC Count: 8.52 (10-23-24 @ 21:55)  WBC Count: 8.36 (10-23-24 @ 08:26)  WBC Count: 10.50 (10-21-24 @ 06:20)      Creatine Trend:  Creatinine: 0.59 (10-24)  Creatinine: 0.67 (10-23)  Creatinine: 0.79 (10-21)  Creatinine: 0.97 (10-20)      Liver Biochemical Testing Trend:  Alanine Aminotransferase (ALT/SGPT): 38 (10-24)  Alanine Aminotransferase (ALT/SGPT): 43 (10-23)  Alanine Aminotransferase (ALT/SGPT): 33 (10-21)  Alanine Aminotransferase (ALT/SGPT): 43 (10-20)  Alanine Aminotransferase (ALT/SGPT): 60 *H* (10-18)  Aspartate Aminotransferase (AST/SGOT): 29 (10-24-24 @ 06:27)  Aspartate Aminotransferase (AST/SGOT): 42 (10-23-24 @ 08:26)  Aspartate Aminotransferase (AST/SGOT): 23 (10-21-24 @ 06:20)  Aspartate Aminotransferase (AST/SGOT): 24 (10-20-24 @ 13:14)  Aspartate Aminotransferase (AST/SGOT): 35 (10-18-24 @ 09:12)  Bilirubin Total: 0.3 (10-24)  Bilirubin Total: 0.6 (10-23)  Bilirubin Total: 0.8 (10-21)  Bilirubin Total: 0.6 (10-20)  Bilirubin Total: 0.4 (10-18)      Trend LDH      Urinalysis Basic - ( 24 Oct 2024 06:27 )    Color: x / Appearance: x / SG: x / pH: x  Gluc: 116 mg/dL / Ketone: x  / Bili: x / Urobili: x   Blood: x / Protein: x / Nitrite: x   Leuk Esterase: x / RBC: x / WBC x   Sq Epi: x / Non Sq Epi: x / Bacteria: x        MICROBIOLOGY:  Vancomycin Level, Trough: 15.3 (10-22 @ 09:24)    MRSA PCR Result.: NotDetec (11-13-23 @ 12:51)      Culture - Blood (collected 21 Oct 2024 18:02)  Source: .Blood BLOOD  Preliminary Report:    No growth at 48 Hours    Culture - Blood (collected 21 Oct 2024 17:58)  Source: .Blood BLOOD  Preliminary Report:    No growth at 48 Hours    Culture - Urine (collected 20 Oct 2024 17:15)  Source: Clean Catch Clean Catch (Midstream)  Final Report:    >100,000 CFU/ml Escherichia coli    >100,000 CFU/ml Klebsiella aerogenes (Previously Enterobacter)  Organism: Escherichia coli  Klebsiella aerogenes (Previously Enterobacter)  Organism: Klebsiella aerogenes (Previously Enterobacter)    Sensitivities:      Method Type: TAYA      -  Amoxicillin/Clavulanic Acid: R >16/8      -  Ampicillin: R >16 These ampicillin results predict results for amoxicillin      -  Ampicillin/Sulbactam: R 8/4      -  Aztreonam: S <=4      -  Cefazolin: R >16      -  Cefepime: S <=2      -  Cefoxitin: R >16      -  Ceftriaxone: S <=1 Enterobacter cloacae, Klebsiella aerogenes, and Citrobacter freundii may develop resistance during prolonged therapy.      -  Ciprofloxacin: S <=0.25      -  Ertapenem: S <=0.5      -  Gentamicin: S <=2      -  Imipenem: S <=1      -  Levofloxacin: S <=0.5      -  Meropenem: S <=1      -  Nitrofurantoin: I 64 Should not be used to treat pyelonephritis      -  Piperacillin/Tazobactam: S <=8 Treatment with Pipercillin/Tazobactam is not recommended in severe infections casued by Klebsiella aerogenes, Enterobacter cloacae complex, and Citrobacter freundii complex.      -  Tobramycin: S <=2      -  Trimethoprim/Sulfamethoxazole: S <=0.5/9.5  Organism: Escherichia coli    Sensitivities:      Method Type: TAYA      -  Amoxicillin/Clavulanic Acid: S <=8/4      -  Ampicillin: S <=8 These ampicillin results predict results for amoxicillin      -  Ampicillin/Sulbactam: S <=4/2      -  Aztreonam: S <=4      -  Cefazolin: S <=2 For uncomplicated UTI with K. pneumoniae, E. coli, or P. mirablis: TAYA <=16 is sensitive and TAYA >=32 is resistant. This also predicts results for oral agents cefaclor, cefdinir, cefpodoxime, cefprozil, cefuroxime axetil, cephalexin and locarbef for uncomplicated UTI. Note that some isolates may be susceptible to these agents while testing resistant to cefazolin.      -  Cefepime: S <=2      -  Cefoxitin: S <=8      -  Ceftriaxone: S <=1      -  Cefuroxime: S <=4      -  Ciprofloxacin: S <=0.25      -  Ertapenem: S <=0.5      -  Gentamicin: S <=2      -  Imipenem: S <=1      -  Levofloxacin: S <=0.5      -  Meropenem: S <=1      -  Nitrofurantoin: S <=32 Should not be used to treat pyelonephritis      -  Piperacillin/Tazobactam: S <=8      -  Tobramycin: S <=2      -  Trimethoprim/Sulfamethoxazole: S <=0.5/9.5    Culture - Blood (collected 20 Oct 2024 13:14)  Source: .Blood BLOOD  Final Report:    Growth in aerobic and anaerobic bottles: Staphylococcus aureus    See previous culture [id number]    Culture - Blood (collected 20 Oct 2024 13:07)  Source: .Blood BLOOD  Final Report:    Growth in aerobic and anaerobic bottles: Staphylococcus aureus    Direct identification is available within approximately 3-5    hours either by Blood Panel Multiplexed PCR or Direct    MALDI-TOF. Details: [email or web address]  Organism: Blood Culture PCR  Staphylococcus aureus  Organism: Staphylococcus aureus    Sensitivities:      Method Type: TAYA      -  Clindamycin: R <=0.25 This isolate is presumed to be clindamycin resistant based on detection of inducible resistance. Clindamycin may still be effective in some patients.      -  Erythromycin: R >4      -  Gentamicin: S <=1 Should not be used as monotherapy      -  Oxacillin: S <=0.25 Oxacillin predicts susceptibility for dicloxacillin, methicillin, and nafcillin      -  Penicillin: R >8      -  Rifampin: S <=1 Should not be used as monotherapy      -  Tetracycline: S <=1      -  Trimethoprim/Sulfamethoxazole: S <=0.5/9.5      -  Vancomycin: S 1  Organism: Blood Culture PCR    Sensitivities:      Method Type: PCR      -  Methicillin SENSITIVE Staphylococcus aureus (MSSA): Detec Any isolate of Staphylococcus aureus from a blood culture is NOT considered a contaminant.    Culture - Urine (collected 29 Jan 2024 02:13)  Source: Clean Catch Clean Catch (Midstream)  Final Report:    >=3 organisms. Probable collection contamination.    Culture - Urine (collected 06 Oct 2023 19:53)  Source: Clean Catch Clean Catch (Midstream)  Final Report:    <10,000 CFU/mL Normal Urogenital Hoa                          COVID-19 PCR: NotDetec (10-21-24 @ 09:20)  COVID-19 PCR: NotDetec (10-17-24 @ 16:34)        C-Reactive Protein: 183 (10-21)              Lactate, Blood: 1.6 (10-23 @ 08:26)      RADIOLOGY:      ACC: [id number] EXAM:  MR SPINE THORACIC WAW IC   ORDERED BY:  BALBINA DAVIS     ACC: [id number] EXAM:  MR SPINE LUMBAR WAW IC   ORDERED BY:  BALBINA DAVIS     PROCEDURE DATE:  10/23/2024          INTERPRETATION:  CLINICAL INFORMATION: Evaluate for spinal abscess. MSSA   bacteremia status post thoracolumbar fusion.    TECHNIQUE: Multiplanar, multisequence MRI was performed of the thoracic   and lumbar spine, WITHOUT and WITH intravenous contrast.  IV CONTRAST: IV contrast documented in unlinked concurrent exam   (accession [id number]), Gadavist (accession [id number])  10 cc administered     0 cc discarded    PRIOR STUDIES: CT thoracic and lumbar spine 10/21/2024.    FINDINGS:    Multiple images degraded by motion artifact and artifact related to lower   thoracic-lumbar fusion hardware partially obscures the regional   structures.    MRI THORACIC SPINE    Degenerative change visualized lower cervical spine, including multiple   disc bulges and/or protrusions, incompletely evaluated on this exam.    No gross cord edema or atrophy, within the limitations of this   examination. Likewise, no definitive intrathecal or paraspinal mass is   evident. Bilateral pleural effusions again noted.    No evidence of diffuse marrow replacement process, newfracture or   subluxation. Multilevel upper thoracic disc space narrowing again noted.   Varying degrees of diffuse disc desiccation with multilevel endplate   spurring.    T1-T2: No disc protrusion or stenosis.  T2-T3: Right paracentral disc protrusion superimposed upon a disc bulge,   effacing the subarachnoid space and flattening the ventral cord,   resulting in mild central canal stenosis.  T3-T4: Disc bulge, without stenosis.  T4-T5: Right paracentral disc protrusion superimposed upon a discbulge,   effacing the subarachnoid space and flattening the ventral cord,   resulting in mild central canal stenosis.  T5-T6: Disc bulge, without stenosis.  T6-T7: No disc protrusion or stenosis.  T7-T8: No disc protrusion or stenosis.  T8-T9: No disc protrusion or stenosis.  T9-T10: No disc protrusion or stenosis.  T10-T11: No disc protrusion or stenosis.  T11-T12: No disc protrusion or stenosis.  T12-L1: Largely obscured by artifact related to posterior fusion and   corpectomy hardware.    No evidence of thoracic discitis-osteomyelitis. No gross epidural   abscess, enhancing mass or abnormal leptomeningeal enhancement within the   limitations of this exam.    MRI LUMBAR SPINE    Partially imaged widening of bilateral sacroiliac joints againnoted,   incompletely evaluated on this exam.    No gross intrathecal or paraspinal mass. Heterogeneous marrow signal can   be seen in the setting of osteoporosis as well as chronic anemia. No   evidence of new, acute fracture. Stable grade 1 anterolisthesis L5 over   S1.    Again seen are varying degrees of fusion of the lumbar vertebral bodies   and posterior elements. No definitive lumbar disc protrusion, central   canal and neural foramen stenosis visualized.    No evidence of lumbar discitis-osteomyelitis. No gross epidural abscess,   enhancing mass or abnormal leptomeningeal enhancement within the   limitations of this exam.    OTHER    Again seen is posterior fusion hardware extending from the T9-L3 levels,   inclusive, with a T12 corpectomy device again noted. At the corpectomy   level on the left, there is a lobulated focus of partially obscured   probable epidural signal, suboptimally evaluated secondary to surrounding   artifact, however which may reflect postsurgical/packing material.    In the dorsal paraspinal through superficial subcutaneous tissues,   extending from the approximate T7-L4 levels, inclusive, there is an   approximate 28.0 x 7.0 x 3.0 (CC x TV x AP) vertically oriented   peripherally enhancing heterogeneous fluid collection, encompassing the   left dorsal paraspinal hardware centered at the T9 level.    IMPRESSION:  1. Approximate 28.0 x 7.0 x 3.0 (CC x TV x AP) rim-enhancing   heterogeneous fluid collection in the dorsal paraspinal through   superficial subcutaneous tissues at the T7-L4 levels, inclusive,   encompassing the left dorsal paraspinal hardware centered at the T9   level. While diagnostic considerations include seroma as well as   hematoma, superimposed infection (abscess) may have an indistinguishable   appearance. Consider fluid sampling.  2. No evidence of thoracolumbar discitis-osteomyelitis. No gross epidural   abscess within the limitations of this exam.  3. Additional findings, including those postsurgical, described in detail   above.    --- End of Report ---            BALBINA DOVER M.D., ATTENDING RADIOLOGIST  This document has been electronically signed. Oct 24 2024 10:41AM     Follow Up:    Neurosurgery consulted- recommending no acute intervention but agree with MRI imaging of spine. Patient reports she went for MRI last night; results pending. Otherwise still complaining of back pain. No urinary sx. Had 1 episode of diarrhea consisting of loose stools. No abdominal pain, nausea, vomiting.       REVIEW OF SYSTEMS  Constitutional: + fevers, No chills  Skin: No rash, no phlebitis	  Respiratory: No cough, no SOB  Cardiovascular:  No chest pain, No palpitations   Gastrointestinal: No pain, No nausea, No vomiting, + diarrhea, No constipation	  Genitourinary: No dysuria, No frequency, No hesitancy, No flank pain  MSK: No Joint pain, + back pain, No edema    Allergies  Dilantin (Urticaria)  penicillins (Urticaria)        ANTIMICROBIALS:    ceFAZolin   IVPB 2000 every 8 hours      OTHER MEDS: MEDICATIONS  (STANDING):  acetaminophen     Tablet .. 975 every 8 hours  atorvastatin 40 at bedtime  cyclobenzaprine 5 three times a day PRN  DULoxetine 20 daily  heparin   Injectable 3500 every 6 hours PRN  heparin   Injectable 7000 every 6 hours PRN  heparin  Infusion 1600 <Continuous>  HYDROmorphone   Tablet 2 every 6 hours PRN  melatonin 3 at bedtime  metoprolol tartrate 12.5 every 12 hours  montelukast 10 daily  pantoprazole    Tablet 40 before breakfast  polyethylene glycol 3350 17 two times a day  pramipexole 0.75 at bedtime  pregabalin 100 every 8 hours  senna 2 at bedtime      Vital Signs Last 24 Hrs  T(F): 99.3 (10-24-24 @ 06:25), Max: 103.9 (10-20-24 @ 12:46)    Vital Signs Last 24 Hrs  HR: 116 (10-24-24 @ 04:13) (87 - 118)  BP: 164/75 (10-24-24 @ 04:13) (109/69 - 176/71)  RR: 18 (10-24-24 @ 04:13)  SpO2: 93% (10-24-24 @ 04:13) (93% - 97%)  Wt(kg): --    EXAM:  General: Patient appears comfortable, no acute distress  HEENT: NCAT, PERRL, anicteric sclera, mucous membranes moist and intact  CV: +irregularly irregular.   Lungs: No respiratory distress, CTA b/l, no wheezing, rales or rhonchi  Abd:  BS4+, Soft, NTND, no guarding  : No suprapubic tenderness  Neuro: AAOx3. No focal deficits noted.   Ext: No cyanosis, no edema  Msk: freely moving upper and lower extremities        Labs:                        7.7    7.81  )-----------( 332      ( 24 Oct 2024 06:23 )             24.9     10-24    137  |  103  |  16  ----------------------------<  116[H]  3.5   |  21[L]  |  0.59    Ca    8.1[L]      24 Oct 2024 06:27  Phos  2.1     10-24  Mg     1.8     10-24    TPro  5.7[L]  /  Alb  3.0[L]  /  TBili  0.3  /  DBili  x   /  AST  29  /  ALT  38  /  AlkPhos  239[H]  10-24      WBC Trend:  WBC Count: 7.81 (10-24-24 @ 06:23)  WBC Count: 8.52 (10-23-24 @ 21:55)  WBC Count: 8.36 (10-23-24 @ 08:26)  WBC Count: 10.50 (10-21-24 @ 06:20)      Creatine Trend:  Creatinine: 0.59 (10-24)  Creatinine: 0.67 (10-23)  Creatinine: 0.79 (10-21)  Creatinine: 0.97 (10-20)      Liver Biochemical Testing Trend:  Alanine Aminotransferase (ALT/SGPT): 38 (10-24)  Alanine Aminotransferase (ALT/SGPT): 43 (10-23)  Alanine Aminotransferase (ALT/SGPT): 33 (10-21)  Alanine Aminotransferase (ALT/SGPT): 43 (10-20)  Alanine Aminotransferase (ALT/SGPT): 60 *H* (10-18)  Aspartate Aminotransferase (AST/SGOT): 29 (10-24-24 @ 06:27)  Aspartate Aminotransferase (AST/SGOT): 42 (10-23-24 @ 08:26)  Aspartate Aminotransferase (AST/SGOT): 23 (10-21-24 @ 06:20)  Aspartate Aminotransferase (AST/SGOT): 24 (10-20-24 @ 13:14)  Aspartate Aminotransferase (AST/SGOT): 35 (10-18-24 @ 09:12)  Bilirubin Total: 0.3 (10-24)  Bilirubin Total: 0.6 (10-23)  Bilirubin Total: 0.8 (10-21)  Bilirubin Total: 0.6 (10-20)  Bilirubin Total: 0.4 (10-18)      Trend LDH      Urinalysis Basic - ( 24 Oct 2024 06:27 )    Color: x / Appearance: x / SG: x / pH: x  Gluc: 116 mg/dL / Ketone: x  / Bili: x / Urobili: x   Blood: x / Protein: x / Nitrite: x   Leuk Esterase: x / RBC: x / WBC x   Sq Epi: x / Non Sq Epi: x / Bacteria: x        MICROBIOLOGY:  Vancomycin Level, Trough: 15.3 (10-22 @ 09:24)    MRSA PCR Result.: NotDetec (11-13-23 @ 12:51)      Culture - Blood (collected 21 Oct 2024 18:02)  Source: .Blood BLOOD  Preliminary Report:    No growth at 48 Hours    Culture - Blood (collected 21 Oct 2024 17:58)  Source: .Blood BLOOD  Preliminary Report:    No growth at 48 Hours    Culture - Urine (collected 20 Oct 2024 17:15)  Source: Clean Catch Clean Catch (Midstream)  Final Report:    >100,000 CFU/ml Escherichia coli    >100,000 CFU/ml Klebsiella aerogenes (Previously Enterobacter)  Organism: Escherichia coli  Klebsiella aerogenes (Previously Enterobacter)  Organism: Klebsiella aerogenes (Previously Enterobacter)    Sensitivities:      Method Type: TAYA      -  Amoxicillin/Clavulanic Acid: R >16/8      -  Ampicillin: R >16 These ampicillin results predict results for amoxicillin      -  Ampicillin/Sulbactam: R 8/4      -  Aztreonam: S <=4      -  Cefazolin: R >16      -  Cefepime: S <=2      -  Cefoxitin: R >16      -  Ceftriaxone: S <=1 Enterobacter cloacae, Klebsiella aerogenes, and Citrobacter freundii may develop resistance during prolonged therapy.      -  Ciprofloxacin: S <=0.25      -  Ertapenem: S <=0.5      -  Gentamicin: S <=2      -  Imipenem: S <=1      -  Levofloxacin: S <=0.5      -  Meropenem: S <=1      -  Nitrofurantoin: I 64 Should not be used to treat pyelonephritis      -  Piperacillin/Tazobactam: S <=8 Treatment with Pipercillin/Tazobactam is not recommended in severe infections casued by Klebsiella aerogenes, Enterobacter cloacae complex, and Citrobacter freundii complex.      -  Tobramycin: S <=2      -  Trimethoprim/Sulfamethoxazole: S <=0.5/9.5  Organism: Escherichia coli    Sensitivities:      Method Type: TAYA      -  Amoxicillin/Clavulanic Acid: S <=8/4      -  Ampicillin: S <=8 These ampicillin results predict results for amoxicillin      -  Ampicillin/Sulbactam: S <=4/2      -  Aztreonam: S <=4      -  Cefazolin: S <=2 For uncomplicated UTI with K. pneumoniae, E. coli, or P. mirablis: TAYA <=16 is sensitive and TAYA >=32 is resistant. This also predicts results for oral agents cefaclor, cefdinir, cefpodoxime, cefprozil, cefuroxime axetil, cephalexin and locarbef for uncomplicated UTI. Note that some isolates may be susceptible to these agents while testing resistant to cefazolin.      -  Cefepime: S <=2      -  Cefoxitin: S <=8      -  Ceftriaxone: S <=1      -  Cefuroxime: S <=4      -  Ciprofloxacin: S <=0.25      -  Ertapenem: S <=0.5      -  Gentamicin: S <=2      -  Imipenem: S <=1      -  Levofloxacin: S <=0.5      -  Meropenem: S <=1      -  Nitrofurantoin: S <=32 Should not be used to treat pyelonephritis      -  Piperacillin/Tazobactam: S <=8      -  Tobramycin: S <=2      -  Trimethoprim/Sulfamethoxazole: S <=0.5/9.5    Culture - Blood (collected 20 Oct 2024 13:14)  Source: .Blood BLOOD  Final Report:    Growth in aerobic and anaerobic bottles: Staphylococcus aureus    See previous culture [id number]    Culture - Blood (collected 20 Oct 2024 13:07)  Source: .Blood BLOOD  Final Report:    Growth in aerobic and anaerobic bottles: Staphylococcus aureus    Direct identification is available within approximately 3-5    hours either by Blood Panel Multiplexed PCR or Direct    MALDI-TOF. Details: [email or web address]  Organism: Blood Culture PCR  Staphylococcus aureus  Organism: Staphylococcus aureus    Sensitivities:      Method Type: TAYA      -  Clindamycin: R <=0.25 This isolate is presumed to be clindamycin resistant based on detection of inducible resistance. Clindamycin may still be effective in some patients.      -  Erythromycin: R >4      -  Gentamicin: S <=1 Should not be used as monotherapy      -  Oxacillin: S <=0.25 Oxacillin predicts susceptibility for dicloxacillin, methicillin, and nafcillin      -  Penicillin: R >8      -  Rifampin: S <=1 Should not be used as monotherapy      -  Tetracycline: S <=1      -  Trimethoprim/Sulfamethoxazole: S <=0.5/9.5      -  Vancomycin: S 1  Organism: Blood Culture PCR    Sensitivities:      Method Type: PCR      -  Methicillin SENSITIVE Staphylococcus aureus (MSSA): Detec Any isolate of Staphylococcus aureus from a blood culture is NOT considered a contaminant.    Culture - Urine (collected 29 Jan 2024 02:13)  Source: Clean Catch Clean Catch (Midstream)  Final Report:    >=3 organisms. Probable collection contamination.    Culture - Urine (collected 06 Oct 2023 19:53)  Source: Clean Catch Clean Catch (Midstream)  Final Report:    <10,000 CFU/mL Normal Urogenital Hoa                          COVID-19 PCR: NotDetec (10-21-24 @ 09:20)  COVID-19 PCR: NotDetec (10-17-24 @ 16:34)        C-Reactive Protein: 183 (10-21)              Lactate, Blood: 1.6 (10-23 @ 08:26)      RADIOLOGY:      ACC: [id number] EXAM:  MR SPINE THORACIC WAW IC   ORDERED BY:  BALBINA DAVIS     ACC: [id number] EXAM:  MR SPINE LUMBAR WAW IC   ORDERED BY:  BALBINA DAVIS     PROCEDURE DATE:  10/23/2024          INTERPRETATION:  CLINICAL INFORMATION: Evaluate for spinal abscess. MSSA   bacteremia status post thoracolumbar fusion.    TECHNIQUE: Multiplanar, multisequence MRI was performed of the thoracic   and lumbar spine, WITHOUT and WITH intravenous contrast.  IV CONTRAST: IV contrast documented in unlinked concurrent exam   (accession [id number]), Gadavist (accession [id number])  10 cc administered     0 cc discarded    PRIOR STUDIES: CT thoracic and lumbar spine 10/21/2024.    FINDINGS:    Multiple images degraded by motion artifact and artifact related to lower   thoracic-lumbar fusion hardware partially obscures the regional   structures.    MRI THORACIC SPINE    Degenerative change visualized lower cervical spine, including multiple   disc bulges and/or protrusions, incompletely evaluated on this exam.    No gross cord edema or atrophy, within the limitations of this   examination. Likewise, no definitive intrathecal or paraspinal mass is   evident. Bilateral pleural effusions again noted.    No evidence of diffuse marrow replacement process, newfracture or   subluxation. Multilevel upper thoracic disc space narrowing again noted.   Varying degrees of diffuse disc desiccation with multilevel endplate   spurring.    T1-T2: No disc protrusion or stenosis.  T2-T3: Right paracentral disc protrusion superimposed upon a disc bulge,   effacing the subarachnoid space and flattening the ventral cord,   resulting in mild central canal stenosis.  T3-T4: Disc bulge, without stenosis.  T4-T5: Right paracentral disc protrusion superimposed upon a discbulge,   effacing the subarachnoid space and flattening the ventral cord,   resulting in mild central canal stenosis.  T5-T6: Disc bulge, without stenosis.  T6-T7: No disc protrusion or stenosis.  T7-T8: No disc protrusion or stenosis.  T8-T9: No disc protrusion or stenosis.  T9-T10: No disc protrusion or stenosis.  T10-T11: No disc protrusion or stenosis.  T11-T12: No disc protrusion or stenosis.  T12-L1: Largely obscured by artifact related to posterior fusion and   corpectomy hardware.    No evidence of thoracic discitis-osteomyelitis. No gross epidural   abscess, enhancing mass or abnormal leptomeningeal enhancement within the   limitations of this exam.    MRI LUMBAR SPINE    Partially imaged widening of bilateral sacroiliac joints againnoted,   incompletely evaluated on this exam.    No gross intrathecal or paraspinal mass. Heterogeneous marrow signal can   be seen in the setting of osteoporosis as well as chronic anemia. No   evidence of new, acute fracture. Stable grade 1 anterolisthesis L5 over   S1.    Again seen are varying degrees of fusion of the lumbar vertebral bodies   and posterior elements. No definitive lumbar disc protrusion, central   canal and neural foramen stenosis visualized.    No evidence of lumbar discitis-osteomyelitis. No gross epidural abscess,   enhancing mass or abnormal leptomeningeal enhancement within the   limitations of this exam.    OTHER    Again seen is posterior fusion hardware extending from the T9-L3 levels,   inclusive, with a T12 corpectomy device again noted. At the corpectomy   level on the left, there is a lobulated focus of partially obscured   probable epidural signal, suboptimally evaluated secondary to surrounding   artifact, however which may reflect postsurgical/packing material.    In the dorsal paraspinal through superficial subcutaneous tissues,   extending from the approximate T7-L4 levels, inclusive, there is an   approximate 28.0 x 7.0 x 3.0 (CC x TV x AP) vertically oriented   peripherally enhancing heterogeneous fluid collection, encompassing the   left dorsal paraspinal hardware centered at the T9 level.    IMPRESSION:  1. Approximate 28.0 x 7.0 x 3.0 (CC x TV x AP) rim-enhancing   heterogeneous fluid collection in the dorsal paraspinal through   superficial subcutaneous tissues at the T7-L4 levels, inclusive,   encompassing the left dorsal paraspinal hardware centered at the T9   level. While diagnostic considerations include seroma as well as   hematoma, superimposed infection (abscess) may have an indistinguishable   appearance. Consider fluid sampling.  2. No evidence of thoracolumbar discitis-osteomyelitis. No gross epidural   abscess within the limitations of this exam.  3. Additional findings, including those postsurgical, described in detail   above.    --- End of Report ---            BALBINA DOVER M.D., ATTENDING RADIOLOGIST  This document has been electronically signed. Oct 24 2024 10:41AM

## 2024-10-24 NOTE — CONSULT NOTE ADULT - SUBJECTIVE AND OBJECTIVE BOX
Interventional Radiology    Evaluate for Procedure: Spinal abscess drainage     HPI: 74F patient of Dr. Stroud h/o OA, HTN, gastroparesis, peripheral neuropathy, multiple prior thoracolumbar spine surgeries for congenital scoliosis w/ removal of hardware 1991. Presented to [facility] on 9/28/24 w/ back & hip pain s/p mechanical fall found to have T12 three column fx, now s/p T9-L3 fxn w/ PSO/partial corpectomy/interbody fxn 9/29/24. D/c'd to [facility] 10/26, had RRT for AMS, fever 10/20, found to have MSSA bacteremia. CT T/L spine 10/21 w/ no e/o fluid collection, hardware intact. Tx to [facility] medicine service for MICHAEL, PICC line. Doing well, mild back pain but managing with meds. No bowel or bladder symptoms, no saddle anesthesia. Medicine starting hep gtt for afib. MR T/L demonstrates Approximate 28.0 x 7.0 x 3.0 (CC x TV x AP) rim-enhancing heterogeneous fluid collection in the dorsal paraspinal through superficial subcutaneous tissues at the T7-L4 levels, inclusive, encompassing the left dorsal paraspinal hardware centered at the T9 level. While diagnostic considerations include seroma as well as hematoma, superimposed infection (abscess) may have an indistinguishable   appearance. IR now consulted for paraspinal fluid collection aspiration.     Allergies: Dilantin (Urticaria)  penicillins (Urticaria)    Medications (Abx/Cardiac/Anticoagulation/Blood Products)  aztreonam  IVPB: 50 mL/Hr IV Intermittent (10-22 @ 16:46)  ceFAZolin   IVPB: 100 mL/Hr IV Intermittent (10-24 @ 05:29)  cefepime   IVPB: 100 mL/Hr IV Intermittent (10-23 @ 08:13)  cefepime   IVPB: 100 mL/Hr IV Intermittent (10-22 @ 18:00)  cefepime   IVPB: 100 mL/Hr IV Intermittent (10-22 @ 21:47)  enoxaparin Injectable: 40 milliGRAM(s) SubCutaneous (10-22 @ 18:00)  enoxaparin Injectable: 40 milliGRAM(s) SubCutaneous (10-23 @ 09:29)  heparin   Injectable: 7000 Unit(s) IV Push (10-23 @ 13:13)  heparin  Infusion: 17 mL/Hr IV Continuous (10-24 @ 09:00)  heparin  Infusion: 16 mL/Hr IV Continuous (10-23 @ 22:25)  heparin  Infusion.: 1600 Unit(s)/Hr IV Continuous (10-23 @ 13:56)  metoprolol tartrate: 12.5 milliGRAM(s) Oral (10-22 @ 18:00)  metoprolol tartrate: 12.5 milliGRAM(s) Oral (10-24 @ 09:01)    Data:  T(C): 36.7  HR: 94  BP: 153/76  RR: 18  SpO2: 94%    -WBC 7.81 / HgB 7.7 / Hct 24.9 / Plt 332  -Na 137 / Cl 103 / BUN 16 / Glucose 116  -K 3.5 / CO2 21 / Cr 0.59  -ALT 38 / Alk Phos 239 / T.Bili 0.3  -INR -- / PTT 49.4    Radiology:   < from: MR Thoracic Spine w/wo IV Cont (10.23.24 @ 23:31) >    INTERPRETATION:  CLINICAL INFORMATION: Evaluate for spinal abscess. MSSA   bacteremia status post thoracolumbar fusion.    TECHNIQUE: Multiplanar, multisequence MRI was performed of the thoracic   and lumbar spine, WITHOUT and WITH intravenous contrast.  IV CONTRAST: IV contrast documented in unlinked concurrent exam   (accession [id number]), Gadavist (accession [id number])  10 cc administered     0 cc discarded    PRIOR STUDIES: CT thoracic and lumbar spine 10/21/2024.    FINDINGS:    Multiple images degraded by motion artifact and artifact related to lower   thoracic-lumbar fusion hardware partially obscures the regional   structures.    MRI THORACIC SPINE    Degenerative change visualized lower cervical spine, including multiple   disc bulges and/or protrusions, incompletely evaluated on this exam.    No gross cord edema or atrophy, within the limitations of this   examination. Likewise, no definitive intrathecal or paraspinal mass is   evident. Bilateral pleural effusions again noted.    No evidence of diffuse marrow replacement process, newfracture or   subluxation. Multilevel upper thoracic disc space narrowing again noted.   Varying degrees of diffuse disc desiccation with multilevel endplate   spurring.    T1-T2: No disc protrusion or stenosis.  T2-T3: Right paracentral disc protrusion superimposed upon a disc bulge,   effacing the subarachnoid space and flattening the ventral cord,   resulting in mild central canal stenosis.  T3-T4: Disc bulge, without stenosis.  T4-T5: Right paracentral disc protrusion superimposed upon a discbulge,   effacing the subarachnoid space and flattening the ventral cord,   resulting in mild central canal stenosis.  T5-T6: Disc bulge, without stenosis.  T6-T7: No disc protrusion or stenosis.  T7-T8: No disc protrusion or stenosis.  T8-T9: No disc protrusion or stenosis.  T9-T10: No disc protrusion or stenosis.  T10-T11: No disc protrusion or stenosis.  T11-T12: No disc protrusion or stenosis.  T12-L1: Largely obscured by artifact related to posterior fusion and   corpectomy hardware.    No evidence of thoracic discitis-osteomyelitis. No gross epidural   abscess, enhancing mass or abnormal leptomeningeal enhancement within the   limitations of this exam.    MRI LUMBAR SPINE    Partially imaged widening of bilateral sacroiliac joints againnoted,   incompletely evaluated on this exam.    No gross intrathecal or paraspinal mass. Heterogeneous marrow signal can   be seen in the setting of osteoporosis as well as chronic anemia. No   evidence of new, acute fracture. Stable grade 1 anterolisthesis L5 over   S1.    Again seen are varying degrees of fusion of the lumbar vertebral bodies   and posterior elements. No definitive lumbar disc protrusion, central   canal and neural foramen stenosis visualized.    No evidence of lumbar discitis-osteomyelitis. No gross epidural abscess,   enhancing mass or abnormal leptomeningeal enhancement within the   limitations of this exam.    OTHER    Again seen is posterior fusion hardware extending from the T9-L3 levels,   inclusive, with a T12 corpectomy device again noted. At the corpectomy   level on the left, there is a lobulated focus of partially obscured   probable epidural signal, suboptimally evaluated secondary to surrounding   artifact, however which may reflect postsurgical/packing material.    In the dorsal paraspinal through superficial subcutaneous tissues,   extending from the approximate T7-L4 levels, inclusive, there is an   approximate 28.0 x 7.0 x 3.0 (CC x TV x AP) vertically oriented   peripherally enhancing heterogeneous fluid collection, encompassing the   left dorsal paraspinal hardware centered at the T9 level.    IMPRESSION:  1. Approximate 28.0 x 7.0 x 3.0 (CC x TV x AP) rim-enhancing   heterogeneous fluid collection in the dorsal paraspinal through   superficial subcutaneous tissues at the T7-L4 levels, inclusive,   encompassing the left dorsal paraspinal hardware centered at the T9   level. While diagnostic considerations include seroma as well as   hematoma, superimposed infection (abscess) may have an indistinguishable   appearance. Consider fluid sampling.  2. No evidence of thoracolumbar discitis-osteomyelitis. No gross epidural   abscess within the limitations of this exam.  3. Additional findings, including those postsurgical, described in detail   above.    < end of copied text >      Assessment/Plan:     74F patient of Dr. Stroud h/o OA, HTN, gastroparesis, peripheral neuropathy, multiple prior thoracolumbar spine surgeries for congenital scoliosis w/ removal of hardware 1991. Presented to [facility] on 9/28/24 w/ back & hip pain s/p mechanical fall found to have T12 three column fx, now s/p T9-L3 fxn w/ PSO/partial corpectomy/interbody fxn 9/29/24. D/c'd to [facility] 10/26, had RRT for AMS, fever 10/20, found to have MSSA bacteremia. CT T/L spine 10/21 w/ no e/o fluid collection, hardware intact. Tx to [facility] medicine service for MICHAEL, PICC line. Doing well, mild back pain but managing with meds. No bowel or bladder symptoms, no saddle anesthesia. Medicine starting hep gtt for afib. MR T/L demonstrates Approximate 28.0 x 7.0 x 3.0 (CC x TV x AP) rim-enhancing heterogeneous fluid collection in the dorsal paraspinal through superficial subcutaneous tissues at the T7-L4 levels, inclusive, encompassing the left dorsal paraspinal hardware centered at the T9 level. While diagnostic considerations include seroma as well as hematoma, superimposed infection (abscess) may have an indistinguishable   appearance. IR now consulted for paraspinal fluid collection aspiration.     - case reviewed and approved for paraspinal collection aspiration tomorrow 10/25 (local anesthesia only)  - will send for culture and beta 2 transferrin level  - no drainage to be done until beta 2 transferrin results   - please place IR procedure order under Dr. Villa   - STAT labs in AM (cbc,coags, bmp, T&S)  - hold heparin gtt for 4 hours prior to procedure   - d/w primary team and ID

## 2024-10-24 NOTE — OCCUPATIONAL THERAPY INITIAL EVALUATION ADULT - DIAGNOSIS, OT EVAL
decreased functional activity endurance, decreased strength, Impaired balance, limited ROM, impacting ability to perform ADL and functional mobility.

## 2024-10-24 NOTE — PROGRESS NOTE ADULT - ASSESSMENT
BcX 10/21 NGTD, on IV cefazolin for MSSA bacteremia appreciated on BCxx2 10/20, T 100.7 o/n, hemodynamically stable     MR back w large T2 hyperintense fluid collection in surgical bed c/f CSF leak w/ mild-mod thecal sac compression, but w/o significant cord compression, pt sitting up comfortably, does not report HA    plan: cont abx per ID, lay flat as much as tolerated, possible revision and correction of CSF leak inpatient vs d/c to [facility] w PICC and o/p f/u, will discuss BcX 10/21 NGTD, on IV cefazolin for MSSA bacteremia appreciated on BCxx2 10/20, T 100.7 o/n, hemodynamically stable     plan:   We will wait for final read. Would consider IR drainage and send for cultures and Beta2 transferring   cont abx per ID and will have a team discussion

## 2024-10-25 ENCOUNTER — RESULT REVIEW (OUTPATIENT)
Age: 74
End: 2024-10-25

## 2024-10-25 DIAGNOSIS — T81.40XA INFECTION FOLLOWING A PROCEDURE, UNSPECIFIED, INITIAL ENCOUNTER: ICD-10-CM

## 2024-10-25 LAB
ADD ON TEST-SPECIMEN IN LAB: SIGNIFICANT CHANGE UP
ALBUMIN SERPL ELPH-MCNC: 3.1 G/DL — LOW (ref 3.3–5)
ALP SERPL-CCNC: 240 U/L — HIGH (ref 40–120)
ALT FLD-CCNC: 30 U/L — SIGNIFICANT CHANGE UP (ref 10–45)
ANION GAP SERPL CALC-SCNC: 13 MMOL/L — SIGNIFICANT CHANGE UP (ref 5–17)
APTT BLD: 36.6 SEC — HIGH (ref 24.5–35.6)
AST SERPL-CCNC: 27 U/L — SIGNIFICANT CHANGE UP (ref 10–40)
B PERT IGG+IGM PNL SER: ABNORMAL
BASOPHILS # BLD AUTO: 0.05 K/UL — SIGNIFICANT CHANGE UP (ref 0–0.2)
BASOPHILS NFR BLD AUTO: 0.6 % — SIGNIFICANT CHANGE UP (ref 0–2)
BILIRUB SERPL-MCNC: 0.3 MG/DL — SIGNIFICANT CHANGE UP (ref 0.2–1.2)
BLD GP AB SCN SERPL QL: NEGATIVE — SIGNIFICANT CHANGE UP
BUN SERPL-MCNC: 14 MG/DL — SIGNIFICANT CHANGE UP (ref 7–23)
CALCIUM SERPL-MCNC: 8.5 MG/DL — SIGNIFICANT CHANGE UP (ref 8.4–10.5)
CHLORIDE SERPL-SCNC: 107 MMOL/L — SIGNIFICANT CHANGE UP (ref 96–108)
CO2 SERPL-SCNC: 21 MMOL/L — LOW (ref 22–31)
COLOR FLD: ABNORMAL
CREAT SERPL-MCNC: 0.63 MG/DL — SIGNIFICANT CHANGE UP (ref 0.5–1.3)
EGFR: 92 ML/MIN/1.73M2 — SIGNIFICANT CHANGE UP
EOSINOPHIL # BLD AUTO: 0.2 K/UL — SIGNIFICANT CHANGE UP (ref 0–0.5)
EOSINOPHIL NFR BLD AUTO: 2.6 % — SIGNIFICANT CHANGE UP (ref 0–6)
FLUID INTAKE SUBSTANCE CLASS: SIGNIFICANT CHANGE UP
GLUCOSE SERPL-MCNC: 109 MG/DL — HIGH (ref 70–99)
GRAM STN FLD: ABNORMAL
HCT VFR BLD CALC: 25.3 % — LOW (ref 34.5–45)
HGB BLD-MCNC: 7.7 G/DL — LOW (ref 11.5–15.5)
IMM GRANULOCYTES NFR BLD AUTO: 0.8 % — SIGNIFICANT CHANGE UP (ref 0–0.9)
INR BLD: 1.07 RATIO — SIGNIFICANT CHANGE UP (ref 0.85–1.16)
LYMPHOCYTES # BLD AUTO: 1.23 K/UL — SIGNIFICANT CHANGE UP (ref 1–3.3)
LYMPHOCYTES # BLD AUTO: 15.7 % — SIGNIFICANT CHANGE UP (ref 13–44)
LYMPHOCYTES # FLD: 1 % — SIGNIFICANT CHANGE UP
MAGNESIUM SERPL-MCNC: 1.9 MG/DL — SIGNIFICANT CHANGE UP (ref 1.6–2.6)
MCHC RBC-ENTMCNC: 26.6 PG — LOW (ref 27–34)
MCHC RBC-ENTMCNC: 30.4 GM/DL — LOW (ref 32–36)
MCV RBC AUTO: 87.5 FL — SIGNIFICANT CHANGE UP (ref 80–100)
MONOCYTES # BLD AUTO: 0.49 K/UL — SIGNIFICANT CHANGE UP (ref 0–0.9)
MONOCYTES NFR BLD AUTO: 6.3 % — SIGNIFICANT CHANGE UP (ref 2–14)
NEUTROPHILS # BLD AUTO: 5.79 K/UL — SIGNIFICANT CHANGE UP (ref 1.8–7.4)
NEUTROPHILS NFR BLD AUTO: 74 % — SIGNIFICANT CHANGE UP (ref 43–77)
NEUTROPHILS-BODY FLUID: 99 % — SIGNIFICANT CHANGE UP
NRBC # BLD: 0 /100 WBCS — SIGNIFICANT CHANGE UP (ref 0–0)
PHOSPHATE SERPL-MCNC: 2.6 MG/DL — SIGNIFICANT CHANGE UP (ref 2.5–4.5)
PLATELET # BLD AUTO: 335 K/UL — SIGNIFICANT CHANGE UP (ref 150–400)
POTASSIUM SERPL-MCNC: 4 MMOL/L — SIGNIFICANT CHANGE UP (ref 3.5–5.3)
POTASSIUM SERPL-SCNC: 4 MMOL/L — SIGNIFICANT CHANGE UP (ref 3.5–5.3)
PROT SERPL-MCNC: 6 G/DL — SIGNIFICANT CHANGE UP (ref 6–8.3)
PROTHROM AB SERPL-ACNC: 12.2 SEC — SIGNIFICANT CHANGE UP (ref 9.9–13.4)
RBC # BLD: 2.89 M/UL — LOW (ref 3.8–5.2)
RBC # FLD: 15.6 % — HIGH (ref 10.3–14.5)
RCV VOL RI: HIGH CELLS/UL (ref 0–5)
RETICS #: 65 K/UL — SIGNIFICANT CHANGE UP (ref 25–125)
RETICS/RBC NFR: 2.2 % — SIGNIFICANT CHANGE UP (ref 0.5–2.5)
RH IG SCN BLD-IMP: NEGATIVE — SIGNIFICANT CHANGE UP
SODIUM SERPL-SCNC: 141 MMOL/L — SIGNIFICANT CHANGE UP (ref 135–145)
SPECIMEN SOURCE: SIGNIFICANT CHANGE UP
TOTAL NUCLEATED CELL COUNT, BODY FLUID: HIGH CELLS/UL (ref 0–5)
TUBE TYPE: SIGNIFICANT CHANGE UP
WBC # BLD: 7.82 K/UL — SIGNIFICANT CHANGE UP (ref 3.8–10.5)
WBC # FLD AUTO: 7.82 K/UL — SIGNIFICANT CHANGE UP (ref 3.8–10.5)

## 2024-10-25 PROCEDURE — 93325 DOPPLER ECHO COLOR FLOW MAPG: CPT | Mod: 26

## 2024-10-25 PROCEDURE — 99233 SBSQ HOSP IP/OBS HIGH 50: CPT | Mod: GC

## 2024-10-25 PROCEDURE — 93320 DOPPLER ECHO COMPLETE: CPT | Mod: 26

## 2024-10-25 PROCEDURE — 93312 ECHO TRANSESOPHAGEAL: CPT | Mod: 26

## 2024-10-25 PROCEDURE — 49406 IMAGE CATH FLUID PERI/RETRO: CPT

## 2024-10-25 RX ORDER — ERYTHROMYCIN STEARATE 250 MG
128 TABLET ORAL EVERY 12 HOURS
Refills: 0 | Status: DISCONTINUED | OUTPATIENT
Start: 2024-10-25 | End: 2024-10-30

## 2024-10-25 RX ORDER — PRAMIPEXOLE DIHYDROCHLORIDE 0.12 MG/1
0.75 TABLET ORAL
Refills: 0 | Status: DISCONTINUED | OUTPATIENT
Start: 2024-10-25 | End: 2024-10-30

## 2024-10-25 RX ORDER — MAGNESIUM SULFATE IN 0.9% NACL 2 G/50 ML
1 INTRAVENOUS SOLUTION, PIGGYBACK (ML) INTRAVENOUS ONCE
Refills: 0 | Status: COMPLETED | OUTPATIENT
Start: 2024-10-25 | End: 2024-10-25

## 2024-10-25 RX ORDER — DULOXETINE HYDROCHLORIDE 30 MG/1
20 CAPSULE, DELAYED RELEASE ORAL DAILY
Refills: 0 | Status: DISCONTINUED | OUTPATIENT
Start: 2024-10-25 | End: 2024-10-30

## 2024-10-25 RX ORDER — HEPARIN SODIUM 10000 [USP'U]/ML
1700 INJECTION INTRAVENOUS; SUBCUTANEOUS
Qty: 25000 | Refills: 0 | Status: DISCONTINUED | OUTPATIENT
Start: 2024-10-25 | End: 2024-10-28

## 2024-10-25 RX ORDER — HYDROMORPHONE HCL/0.9% NACL/PF 6 MG/30 ML
2 PATIENT CONTROLLED ANALGESIA SYRINGE INTRAVENOUS EVERY 8 HOURS
Refills: 0 | Status: DISCONTINUED | OUTPATIENT
Start: 2024-10-25 | End: 2024-10-30

## 2024-10-25 RX ORDER — ERGOCALCIFEROL (VITAMIN D2) 200 MCG/ML
50000 DROPS ORAL
Refills: 0 | Status: DISCONTINUED | OUTPATIENT
Start: 2024-10-25 | End: 2024-10-30

## 2024-10-25 RX ORDER — DIBASIC SODIUM PHOSPHATE, MONOBASIC POTASSIUM PHOSPHATE AND MONOBASIC SODIUM PHOSPHATE 852; 155; 130 MG/1; MG/1; MG/1
1 TABLET ORAL ONCE
Refills: 0 | Status: DISCONTINUED | OUTPATIENT
Start: 2024-10-25 | End: 2024-10-25

## 2024-10-25 RX ORDER — METOPROLOL TARTRATE 50 MG
25 TABLET ORAL
Refills: 0 | Status: DISCONTINUED | OUTPATIENT
Start: 2024-10-25 | End: 2024-10-25

## 2024-10-25 RX ORDER — SENNA 187 MG
2 TABLET ORAL AT BEDTIME
Refills: 0 | Status: DISCONTINUED | OUTPATIENT
Start: 2024-10-25 | End: 2024-10-30

## 2024-10-25 RX ORDER — CYCLOBENZAPRINE HYDROCHLORIDE 30 MG/1
5 CAPSULE, EXTENDED RELEASE ORAL THREE TIMES A DAY
Refills: 0 | Status: DISCONTINUED | OUTPATIENT
Start: 2024-10-25 | End: 2024-10-29

## 2024-10-25 RX ORDER — ACETAMINOPHEN 500 MG
975 TABLET ORAL EVERY 8 HOURS
Refills: 0 | Status: DISCONTINUED | OUTPATIENT
Start: 2024-10-25 | End: 2024-10-30

## 2024-10-25 RX ORDER — POLYETHYLENE GLYCOL 3350 17 G/17G
17 POWDER, FOR SOLUTION ORAL
Refills: 0 | Status: DISCONTINUED | OUTPATIENT
Start: 2024-10-25 | End: 2024-10-30

## 2024-10-25 RX ORDER — ONDANSETRON HYDROCHLORIDE 2 MG/ML
4 INJECTION, SOLUTION INTRAMUSCULAR; INTRAVENOUS ONCE
Refills: 0 | Status: DISCONTINUED | OUTPATIENT
Start: 2024-10-25 | End: 2024-10-25

## 2024-10-25 RX ORDER — B-COMPLEX WITH VITAMIN C
1 VIAL (ML) INJECTION DAILY
Refills: 0 | Status: DISCONTINUED | OUTPATIENT
Start: 2024-10-25 | End: 2024-10-30

## 2024-10-25 RX ORDER — MONTELUKAST SODIUM 10 MG/1
10 TABLET, FILM COATED ORAL DAILY
Refills: 0 | Status: DISCONTINUED | OUTPATIENT
Start: 2024-10-25 | End: 2024-10-30

## 2024-10-25 RX ORDER — CEFAZOLIN SODIUM 1 G
2000 VIAL (EA) INJECTION EVERY 8 HOURS
Refills: 0 | Status: DISCONTINUED | OUTPATIENT
Start: 2024-10-25 | End: 2024-10-30

## 2024-10-25 RX ORDER — METOPROLOL TARTRATE 50 MG
25 TABLET ORAL
Refills: 0 | Status: DISCONTINUED | OUTPATIENT
Start: 2024-10-25 | End: 2024-10-30

## 2024-10-25 RX ORDER — PREGABALIN 150 MG/1
100 CAPSULE ORAL EVERY 8 HOURS
Refills: 0 | Status: DISCONTINUED | OUTPATIENT
Start: 2024-10-25 | End: 2024-10-30

## 2024-10-25 RX ADMIN — POLYETHYLENE GLYCOL 3350 17 GRAM(S): 17 POWDER, FOR SOLUTION ORAL at 18:18

## 2024-10-25 RX ADMIN — Medication 1 TABLET(S): at 18:20

## 2024-10-25 RX ADMIN — Medication 2 TABLET(S): at 21:40

## 2024-10-25 RX ADMIN — Medication 975 MILLIGRAM(S): at 22:21

## 2024-10-25 RX ADMIN — Medication 975 MILLIGRAM(S): at 05:21

## 2024-10-25 RX ADMIN — CYCLOBENZAPRINE HYDROCHLORIDE 5 MILLIGRAM(S): 30 CAPSULE, EXTENDED RELEASE ORAL at 18:26

## 2024-10-25 RX ADMIN — Medication 1 LOZENGE: at 01:22

## 2024-10-25 RX ADMIN — Medication 975 MILLIGRAM(S): at 05:51

## 2024-10-25 RX ADMIN — Medication 50000 UNIT(S): at 18:16

## 2024-10-25 RX ADMIN — Medication 975 MILLIGRAM(S): at 21:40

## 2024-10-25 RX ADMIN — Medication 1 LOZENGE: at 09:17

## 2024-10-25 RX ADMIN — Medication 2 MILLIGRAM(S): at 01:56

## 2024-10-25 RX ADMIN — Medication 2 MILLIGRAM(S): at 09:16

## 2024-10-25 RX ADMIN — PRAMIPEXOLE DIHYDROCHLORIDE 0.75 MILLIGRAM(S): 0.12 TABLET ORAL at 21:39

## 2024-10-25 RX ADMIN — Medication 100 GRAM(S): at 07:05

## 2024-10-25 RX ADMIN — Medication 100 MILLIGRAM(S): at 05:22

## 2024-10-25 RX ADMIN — Medication 2 MILLIGRAM(S): at 21:38

## 2024-10-25 RX ADMIN — Medication 50000 UNIT(S): at 05:21

## 2024-10-25 RX ADMIN — HEPARIN SODIUM 17 UNIT(S)/HR: 10000 INJECTION INTRAVENOUS; SUBCUTANEOUS at 23:10

## 2024-10-25 RX ADMIN — Medication 40 MILLIGRAM(S): at 21:40

## 2024-10-25 RX ADMIN — Medication 128 MILLIGRAM(S): at 18:18

## 2024-10-25 RX ADMIN — Medication 100 MILLIGRAM(S): at 21:38

## 2024-10-25 RX ADMIN — PREGABALIN 100 MILLIGRAM(S): 150 CAPSULE ORAL at 05:21

## 2024-10-25 RX ADMIN — Medication 12.5 MILLIGRAM(S): at 09:17

## 2024-10-25 RX ADMIN — PREGABALIN 100 MILLIGRAM(S): 150 CAPSULE ORAL at 21:38

## 2024-10-25 RX ADMIN — MONTELUKAST SODIUM 10 MILLIGRAM(S): 10 TABLET, FILM COATED ORAL at 18:20

## 2024-10-25 RX ADMIN — Medication 2 MILLIGRAM(S): at 09:46

## 2024-10-25 RX ADMIN — Medication 2 MILLIGRAM(S): at 22:38

## 2024-10-25 RX ADMIN — Medication 100 MILLIGRAM(S): at 14:36

## 2024-10-25 RX ADMIN — CYCLOBENZAPRINE HYDROCHLORIDE 5 MILLIGRAM(S): 30 CAPSULE, EXTENDED RELEASE ORAL at 09:17

## 2024-10-25 RX ADMIN — Medication 2 MILLIGRAM(S): at 01:23

## 2024-10-25 RX ADMIN — Medication 25 MILLIGRAM(S): at 18:16

## 2024-10-25 NOTE — PRE PROCEDURE NOTE - PRE PROCEDURE EVALUATION
Interventional Radiology    HPI: 74F patient of Dr. Stroud h/o OA, HTN, gastroparesis, peripheral neuropathy, multiple prior thoracolumbar spine surgeries for congenital scoliosis w/ removal of hardware 1991. Presented to [facility] on 9/28/24 w/ back & hip pain s/p mechanical fall found to have T12 three column fx, now s/p T9-L3 fxn w/ PSO/partial corpectomy/interbody fxn 9/29/24. D/c'd to [facility] 10/26, had RRT for AMS, fever 10/20, found to have MSSA bacteremia. CT T/L spine 10/21 w/ no e/o fluid collection, hardware intact. Tx to [facility] medicine service for MICHAEL, PICC line. Doing well, mild back pain but managing with meds. No bowel or bladder symptoms, no saddle anesthesia. Medicine starting hep gtt for afib. MR T/L demonstrates Approximate 28.0 x 7.0 x 3.0 (CC x TV x AP) rim-enhancing heterogeneous fluid collection in the dorsal paraspinal through superficial subcutaneous tissues at the T7-L4 levels, inclusive, encompassing the left dorsal paraspinal hardware centered at the T9 level. While diagnostic considerations include seroma as well as hematoma, superimposed infection (abscess) may have an indistinguishable   appearance. IR consulted for paraspinal fluid collection aspiration. Multidisciplinary conversation had with neurosurgery and IR. Dr. Stroud requesting complete drainage of paraspinal fluid collection. Typically would like to wait for beta 2 transferrin to result, however neurosurgery feels strongly that this is not a CSF leak based on patient's clinical presentation. Patient now presents to IR for paraspinal fluid collection drainage.     Allergies: Dilantin (Urticaria)  penicillins (Urticaria)    Medications (Abx/Cardiac/Anticoagulation/Blood Products)  ceFAZolin   IVPB: 100 mL/Hr IV Intermittent (10-25 @ 05:22)  erythromycin    ethylsuccinate Suspension 40 mG/mL: 128 milliGRAM(s) Oral (10-24 @ 18:07)  heparin   Injectable: 7000 Unit(s) IV Push (10-23 @ 13:13)  heparin  Infusion: 16 mL/Hr IV Continuous (10-23 @ 22:25)  heparin  Infusion: 17 mL/Hr IV Continuous (10-24 @ 09:00)  heparin  Infusion.: 1600 Unit(s)/Hr IV Continuous (10-23 @ 13:56)  metoprolol tartrate: 12.5 milliGRAM(s) Oral (10-25 @ 09:17)    Data:  T(C): 37.6  HR: 125  BP: 125/78  RR: 18  SpO2: 96%    Exam  General: No acute distress  Chest: Non labored breathing  Abdomen: Non-distended  Extremities: No swelling, warm    -WBC 7.82 / HgB 7.7 / Hct 25.3 / Plt 335  -Na 141 / Cl 107 / BUN 14 / Glucose 109  -K 4.0 / CO2 21 / Cr 0.63  -ALT 30 / Alk Phos 240 / T.Bili 0.3  -INR1.07    Imaging:   < from: MR Thoracic Spine w/wo IV Cont (10.23.24 @ 23:31) >    FINDINGS:    Multiple images degraded by motion artifact and artifact related to lower   thoracic-lumbar fusion hardware partially obscures the regional   structures.    MRI THORACIC SPINE    Degenerative change visualized lower cervical spine, including multiple   disc bulges and/or protrusions, incompletely evaluated on this exam.    No gross cord edema or atrophy, within the limitations of this   examination. Likewise, no definitive intrathecal or paraspinal mass is   evident. Bilateral pleural effusions again noted.    No evidence of diffuse marrow replacement process, newfracture or   subluxation. Multilevel upper thoracic disc space narrowing again noted.   Varying degrees of diffuse disc desiccation with multilevel endplate   spurring.    T1-T2: No disc protrusion or stenosis.  T2-T3: Right paracentral disc protrusion superimposed upon a disc bulge,   effacing the subarachnoid space and flattening the ventral cord,   resulting in mild central canal stenosis.  T3-T4: Disc bulge, without stenosis.  T4-T5: Right paracentral disc protrusion superimposed upon a discbulge,   effacing the subarachnoid space and flattening the ventral cord,   resulting in mild central canal stenosis.  T5-T6: Disc bulge, without stenosis.  T6-T7: No disc protrusion or stenosis.  T7-T8: No disc protrusion or stenosis.  T8-T9: No disc protrusion or stenosis.  T9-T10: No disc protrusion or stenosis.  T10-T11: No disc protrusion or stenosis.  T11-T12: No disc protrusion or stenosis.  T12-L1: Largely obscured by artifact related to posterior fusion and   corpectomy hardware.    No evidence of thoracic discitis-osteomyelitis. No gross epidural   abscess, enhancing mass or abnormal leptomeningeal enhancement within the   limitations of this exam.    MRI LUMBAR SPINE    Partially imaged widening of bilateral sacroiliac joints againnoted,   incompletely evaluated on this exam.    No gross intrathecal or paraspinal mass. Heterogeneous marrow signal can   be seen in the setting of osteoporosis as well as chronic anemia. No   evidence of new, acute fracture. Stable grade 1 anterolisthesis L5 over   S1.    Again seen are varying degrees of fusion of the lumbar vertebral bodies   and posterior elements. No definitive lumbar disc protrusion, central   canal and neural foramen stenosis visualized.    No evidence of lumbar discitis-osteomyelitis. No gross epidural abscess,   enhancing mass or abnormal leptomeningeal enhancement within the   limitations of this exam.    OTHER    Again seen is posterior fusion hardware extending from the T9-L3 levels,   inclusive, with a T12 corpectomy device again noted. At the corpectomy   level on the left, there is a lobulated focus of partially obscured   probable epidural signal, suboptimally evaluated secondary to surrounding   artifact, however which may reflect postsurgical/packing material.    In the dorsal paraspinal through superficial subcutaneous tissues,   extending from the approximate T7-L4 levels, inclusive, there is an   approximate 28.0 x 7.0 x 3.0 (CC x TV x AP) vertically oriented   peripherally enhancing heterogeneous fluid collection, encompassing the   left dorsal paraspinal hardware centered at the T9 level.    IMPRESSION:  1. Approximate 28.0 x 7.0 x 3.0 (CC x TV x AP) rim-enhancing   heterogeneous fluid collection in the dorsal paraspinal through   superficial subcutaneous tissues at the T7-L4 levels, inclusive,   encompassing the left dorsal paraspinal hardware centered at the T9   level. While diagnostic considerations include seroma as well as   hematoma, superimposed infection (abscess) may have an indistinguishable   appearance. Consider fluid sampling.  2. No evidence of thoracolumbar discitis-osteomyelitis. No gross epidural   abscess within the limitations of this exam.  3. Additional findings, including those postsurgical, described in detail   above.    < end of copied text >      Plan: 74y Female presents for paraspinal fluid collection drainage.   -Risks/Benefits/alternatives explained with the patient and/or healthcare proxy and witnessed informed consent obtained.   
Pre Procedure Note:    Procedure Service:  Cardiology   Procedure Name: Transesophageal Echo  Pre Procedure Evaluation: MICHAEL r/o endocarditis       RELEVANT PMH/PSH:    Any respiratory problems: Asthma, COPD, SILVANA, recent respiratory illness? NO    Any history of Atlantoaxial disease and severe generalized cervical arthritis? NO    Oral/Dental history: Any dentures, removable, loose, broken tooth/teeth, mouth sores? NO    Significant dysphagia and odynophagia? no    See GI note and clearence     ALLERGIES: pcn  MEDICATION: REVIEWED  REVIEW OF SYSTEMS:  CONSTITUTIONAL: No fever, weight loss, or fatigue  HEENT: No eye issues, No sinus or throat pain; No pain or stiffness at neck  RESPIRATORY: No cough, wheezing, chills or hemoptysis; Shortness of Breath  CARDIOVASCULAR: No chest pain, palpitations, passing out, dizziness, or leg swelling  GASTROINTESTINAL: No abd pain, nausea, vomiting diarrhea constipation. No melena or hematochezia.      PHYSICAL EXAM:  Vital Signs:  BP   115 / 59     HR89     O2 sat  99  Appearance: Normal	  HEENT:   Normal oral mucosa, PERRL, EOMI	  Cardiovascular: Normal S1 S2, No JVD, No murmurs, No edema  Respiratory: Lungs clear to auscultation	  Gastrointestinal:  Soft, Non-tender, + BS	  Neurologic/PSY: Non-focal, A&O x 3  Extremities: No clubbing, cyanosis or edema    LABS: reviewed  CARDIAC TESTING/STUDIES:    [ x] ECG  [ x] Echocardiogram:      Plan: 	  MICHAEL Procedure Candidate:	YES  Medical Records Reviewed: YES  Available ECHO images and reports Reviewed: YES     Day of Procedure Attestation:  I have personally seen, examined, and participated in the care of this patient. I have reviewed all pertinent information, including history, physical exam, sedation plan, and agree except as noted.    Attestation Statements:  I have personally seen and examined the patient.  I fully participated in the care of this patient.  I have made amendments to the documentation where necessary, and agree with the history, physical exam, and plan as documented by the Fellow.

## 2024-10-25 NOTE — PROGRESS NOTE ADULT - ASSESSMENT
Patient is a 74-year-old female patient with past medical history of OA, HTN, gastroparesis, peripheral neuropathy, multiple prior thoracolumbar spine surgeries for congenital scoliosis [place]/ [place] of [place] (1991) who presented to [facility] on 9/28/24 with back pain and left hip pain s/p mechanical fall. CT imaging was performed and reported a T12 three column spine fracture-malalignment with fracture extending to the adjacent right 12th posterior rib and left T12-L1 facet joint and acute, displaced fracture of the left eighth lateral rib. Chronic appearing bilateral rib deformities. Surgical management was recommended and she is S/P T9-L3 open fusion with PSO/lag screw partial corpectomy/interbody by Dr. Stroud, Neurosurgeon with complex Plastic Surgery closure by Dr. Elias on 9/29/24. Post op course c/b CSF leak s/p repair, new onset paroxsymal Afib with RVR,  increased pain and drainage from surgical incision, development of pleural effusions, symptomatic orthostatic hypotension, urinary retention, post op anemia, and chest pain with spontaneous resolution. Cardiac work up negative. Patient was evaluated by PM&R and therapy for functional deficits, gait/ADL impairments and acute rehabilitation was recommended. Patient was cleared for discharge to [facility] IRF on 10/16/24.     Rehab course complicated by RRT on 10/20 for fever of 103.9 f, tachycardia, and AMS. Infectious workup following RRT notable for leukocytosis to 10.58, lactic acid 2.6, U/A with positive nitrites, large LE, 25 WBCs, urine culture growing klebsiella aerogenes and e. coli, and 10/20 blood cultures x 2 with MSSA. Repeat blood cultures on 10/21 with NGTD. CT head without acute pathology. CT T and L spine: Unremarkable CT scan of the thoracic spine.   No vertebral fracture is recognized. ID was consulted while at [facility] who recommended Transfer request initiated from [place] to [facility] for MICHAEL and completion of bacteremia workup/treatment.      Patient has had unchanged persistent back pain since surgery. Other than spinal hardware, patient reports she had a right knee replacement this year, two hip replacements (one 2 years ago and one 20 years ago), and right shoulder replacement 10 years ago. States none of these sites have been bothering her. Denies any prosthetic heart valves or murmurs. Clincal exam notable for fluctuance over lower L spine.  MRI imaging performed now showing 28.0 x 7.0 x 3.0 (CC x TV x AP) rim-enhancing heterogeneous fluid collection in the dorsal paraspinal through superficial subcutaneous tissues at the T7-L4 levels. Now s/p IR aspiration on 10/25.     Abx  -Cefazolin 2 g IVPB q8h ( 10/23-)   -s/p Vancomycin, Aztreonam and cefepime     #MSSA bacteremia  #recent spinal surgery with hardware  #presence of other prosthetic joints   #rim enhancing fluid collection in T7-L4 region  -TTE without vegetations   -f/u IR fluid cultures from spinal aspirate   -f/u MICHAEL (planned for today)   -continue with Cefazolin 2 g IVPB q8h  -f/u all culture data  -monitor WBC and fever curve     Case seen and discussed with Dr. Waterman who agrees with assessment and plan. Note not final until attending addendum.      Patient is a 74-year-old female patient with past medical history of OA, HTN, gastroparesis, peripheral neuropathy, multiple prior thoracolumbar spine surgeries for congenital scoliosis [place] w/ removal of hardware (1991) who presented to [facility] on 9/28/24 with back pain and left hip pain s/p mechanical fall. CT imaging was performed and reported a T12 three column spine fracture-malalignment with fracture extending to the adjacent right 12th posterior rib and left T12-L1 facet joint and acute, displaced fracture of the left eighth lateral rib. Chronic appearing bilateral rib deformities. Surgical management was recommended and she is S/P T9-L3 open fusion with PSO/lag screw partial corpectomy/interbody by Dr. Stroud, Neurosurgeon with complex Plastic Surgery closure by Dr. Elias on 9/29/24. Post op course c/b CSF leak s/p repair, new onset paroxsymal Afib with RVR,  increased pain and drainage from surgical incision, development of pleural effusions, symptomatic orthostatic hypotension, urinary retention, post op anemia, and chest pain with spontaneous resolution. Cardiac work up negative. Patient was evaluated by PM&R and therapy for functional deficits, gait/ADL impairments and acute rehabilitation was recommended. Patient was cleared for discharge to [facility] IRF on 10/16/24.     Rehab course complicated by RRT on 10/20 for fever of 103.9 f, tachycardia, and AMS. Infectious workup following RRT notable for leukocytosis to 10.58, lactic acid 2.6, U/A with positive nitrites, large LE, 25 WBCs, urine culture growing klebsiella aerogenes and e. coli, and 10/20 blood cultures x 2 with MSSA. Repeat blood cultures on 10/21 with NGTD. CT head without acute pathology. CT T and L spine: Unremarkable CT scan of the thoracic spine.   No vertebral fracture is recognized. ID was consulted while at [facility] who recommended Transfer request initiated from [place] to [facility] for MICHAEL and completion of bacteremia workup/treatment.      Patient has had unchanged persistent back pain since surgery. Other than spinal hardware, patient reports she had a right knee replacement this year, two hip replacements (one 2 years ago and one 20 years ago), and right shoulder replacement 10 years ago. States none of these sites have been bothering her. Denies any prosthetic heart valves or murmurs. Clincal exam notable for fluctuance over lower L spine.  MRI imaging performed now showing 28.0 x 7.0 x 3.0 (CC x TV x AP) rim-enhancing heterogeneous fluid collection in the dorsal paraspinal through superficial subcutaneous tissues at the T7-L4 levels. Now s/p IR aspiration on 10/25.     Abx  -Cefazolin 2 g IVPB q8h ( 10/23-)   -s/p Vancomycin, Aztreonam and cefepime     #MSSA bacteremia  #recent spinal surgery with hardware  #presence of other prosthetic joints   #rim enhancing fluid collection in T7-L4 region  -TTE without vegetations   -f/u IR fluid cultures from spinal aspirate   -f/u MICHAEL (planned for today)   -continue with Cefazolin 2 g IVPB q8h  -f/u all culture data  -monitor WBC and fever curve     Case seen and discussed with Dr. Waterman who agrees with assessment and plan. Note not final until attending addendum.

## 2024-10-25 NOTE — PROGRESS NOTE ADULT - ATTENDING COMMENTS
Patient seen and examined. Chart with pertinent labs and imaging reviewed.  Seen earlier today. Gram stain of fluid now reported, GPC.   MICHAEL- no concern of IE  F/U Cx NTD  Continue cefazolin  F/U Cx data  D/W patient and her son. All questions answered to the best of my ability.     If any ID input is needed over the weekend, please call [phone]. Thanks.    Kaushal Waterman MD  Attending Physician  [facility]  Division of Infectious Diseases  [phone]

## 2024-10-25 NOTE — PROGRESS NOTE ADULT - ASSESSMENT
74-year-old female patient with past medical history of OA, HTN, gastroparesis, peripheral neuropathy, multiple prior thoracolumbar spine surgeries for congenital scoliosis done >[place] w/ removal of hardware (1991) and recent fall with T11-T12 fracture, s/p thoracolumbar posterior fusion for t spine fx 9/29, c/b post op csf leak, sent to rehab on 10/17, c/b sepsis with high fever, rigors, tachycardia on 10/20 found to have mssa bacteremia. CT spine no report of collection. Urine +ve E coli, transferred back to [facility] for MICHAEL, advanced spine imaging, and further management of MSSA bacteremia.

## 2024-10-25 NOTE — PROGRESS NOTE ADULT - PROBLEM SELECTOR PLAN 1
Patient presents with sepsis with high fever, rigors, tachycardia on 10/20 found to have MSSA bacteremia.  - 10/20 Bcx MSSA, 10/21 no growth at 24hrs  - CXR with old rib fracture and small RUL infiltrate  - 10/20 +UA for Ecoli, follow urine culture  - lactic acidosis resolved, lactate 1.6 10/23  - 10/23 Na 134 K 3.3 Phos 1.5  - MRI: rim-enhancing heterogeneous fluid collection suspicious for an abscess  - TTE: no evidence of vegetations    Plan  - Neurosurg consulted and following, consulted IR for drainage and requests cultures and Beta2 transferrin of fluid be sent, f/u reccs  - ID consulted, requests culture and gram stain fluid  - IR consulted, approved for paraspinal collection aspiration tomorrow 10/25 (local anesthesia only), pending beta 2 transferrin results, STAT labs in AM (cbc,coags, bmp, T&S), hold heparin gtt for 4 hours prior to procedure   - MICHAEL scheduled for 10/25, NPO at midnight  - Repeat daily Bcx  - Continue to monitor and replete electrolytes  - c/w cefazolin 2000mg IV q8  - Monitor WBC and fever curve Patient presents with sepsis with high fever, rigors, tachycardia on 10/20 found to have MSSA bacteremia.  - 10/20 Bcx MSSA, 10/21 no growth at 24hrs  - CXR with old rib fracture and small RUL infiltrate  - 10/20 +UA for Ecoli, follow urine culture  - lactic acidosis resolved, lactate 1.6 10/23  - 10/23 Na 134 K 3.3 Phos 1.5  - MRI: rim-enhancing heterogeneous fluid collection suspicious for an abscess  - TTE: no evidence of vegetations    Plan  - Neurosurg consulted and following, no neurosurgical contraindication to drain fluid w IR today in addition to sending aspirate for cultures, f/u reccs  - ID consulted, requests culture and gram stain fluid  - IR consulted, approved for paraspinal collection aspiration today 10/25   - MICHAEL scheduled for 10/25, NPO at midnight  - Repeat daily Bcx  - Continue to monitor electrolytes  - c/w cefazolin 2000mg IV q8  - Monitor WBC and fever curve

## 2024-10-25 NOTE — PROGRESS NOTE ADULT - ASSESSMENT
Low c/f for CSF leak  No neurosurgical contraindication to drain as much fluid as possible w IR today and send aspirate for cultures  No neurosurgical contraindication to drain fluid w IR today in addition to sending aspirate for cultures  Orthopedic

## 2024-10-25 NOTE — PROGRESS NOTE ADULT - PROBLEM SELECTOR PLAN 4
- 10/1 TTE: EF 61%  - S/P 1L IVF bolus (10/13) for symptomatic hypotension  - /66  -> 146/77 -> 141/90    Plan  - c/w atorvastatin 40mg PO qd  - Restart Losartan 100 mg daily and Chlorthalidone 25 mg daily (restart if SBP is consistently above 140 per cardio)  - HOLD midodrine 7.5 mg AM - 10/1 TTE: EF 61%  - S/P 1L IVF bolus (10/13) for symptomatic hypotension  - /66  -> 146/77 -> 141/90    Plan  - Increase Metoprolol to 20 BID  - c/w atorvastatin 40mg PO qd  - Restart Losartan 100 mg daily and Chlorthalidone 25 mg daily (restart if SBP is consistently above 140 per cardio)  - HOLD midodrine 7.5 mg AM - 10/1 TTE: EF 61%  - S/P 1L IVF bolus (10/13) for symptomatic hypotension  - /66  -> 146/77 -> 141/90    Plan  - Increase Metoprolol to 25 BID  - c/w atorvastatin 40mg PO qd  - Restart Losartan 100 mg daily and Chlorthalidone 25 mg daily (restart if SBP is consistently above 140 per cardio)  - HOLD midodrine 7.5 mg AM

## 2024-10-25 NOTE — CHART NOTE - NSCHARTNOTEFT_GEN_A_CORE
Reference #: [id number]    There are no results for the search terms that you entered.
74-year-old female patient with past medical history of OA, HTN, gastroparesis, peripheral neuropathy, multiple prior thoracolumbar spine surgeries for congenital scoliosis [place] w/ removal of hardware (1991) and recent fall with T11-T12 fracture, s/p thoracolumbar posterior fusion for t spine fx 9/29, c/b post op csf leak, sent to rehab on 10/17, c/b sepsis with high fever, rigors, tachycardia on 10/20 found to have mssa bacteremia. CT spine no report of collection. Urine +ve E coli, transferred back to [facility] for MICHAEL, advanced spine imaging, and further management of MSSA bacteremia.    GI called for clearance for MICHAEL procedure. Per endoscopy performed in January, Z line present at 35cm from incisors. Would not proceed MICHAEL probe past 35 cm. Otherwise, no absolute GI contraindications to MICHAEL.          Sally Soriano  GI/Hepatology Fellow    MONDAY-FRIDAY 8AM-5PM:  Pager# [phone] ([facility]) or [phone] ([facility])    NON-URGENT CONSULTS:  Please email [email or web address]  AT NIGHT AND ON WEEKENDS:  Contact on-call GI fellow from 5pm-8am and on weekends/holidays

## 2024-10-25 NOTE — PROGRESS NOTE ADULT - PROBLEM SELECTOR PLAN 12
- DVT prophylaxis: switch from Lovenox --> heparin gtt  - Diet: DASH/TLC  - Disposition: - DVT prophylaxis: heparin infusion  - Diet: DASH/TLC  - Disposition: Pending course

## 2024-10-25 NOTE — PROGRESS NOTE ADULT - ATTENDING COMMENTS
75 yo F with PMH of HTN, gastroparesis, peripheral neuropathy, multiple prior thoracolumbar spine surgeries for congenital scoliosis presents with back pain s/p mechanical fall from sitting. Found with T12 three column spine fracture-malalignment with fracture extending to the adjacent right 12th posterior rib and left T12-L1 facet joint s/p T9-L3 fusion on 9/29 post-op course c/b recurrent brief Afib discharged to acute rehab where she had RRT 2/2 to sepsis, BCX + MSSA tx to [facility] for further zeinab/      #Sepsis 2/2 to MSSA bacteremia  # Paraspinal collection  #Paroxysmal atrial fibrillation  #HTN  # Depression  # Acute on Chronic back pain      - MRI T/L spine 8.0 x 7.0 x 3.0 (CC x TV x AP) rim-enhancing heterogeneous fluid collection in the dorsal paraspinal through superficial subcutaneous tissues at the T7-L4 levels; 8.0 x 7.0 x 3.0 (CC x TV x AP) rim-enhancing heterogeneous fluid collection in the dorsal paraspinal through   superficial subcutaneous tissues at the T7-L4 levels  -plan for IR guided aspiration of paraspinal fluid collection 10/25 and drainage, f/u cultures, b2 transferrin, if AMS then clamp call NSG, IR    - c/w Ancef 2gm q8hr, repeat BCX 10/23 NGTD  - likely source of sepsis is MSSA bacteremia is paraspinal collection 4/4 +MSSA 10/20, less likely UTI- UCX with E coli and Klebsiella but patient asymptomatic  - TTE 10/23- no vegetations, plan for MICHAEL, appreciate GI recs   - NSG c/s appreciated  - also found to be back in a fib- ekg confirmed- tele monitoring- CHADSVASC is 5- hep gtt- and once done with procedures plan to transition to [facility]  - c/w hydromoprhone 2mg PO q8hr for severe pain, senna, miralax for bowel regimen  - resume home losartan post procedure, increase metoprolol to 25mg BID for more adequate rate control  - c/w tele for now 75 yo F with PMH of HTN, gastroparesis, peripheral neuropathy, multiple prior thoracolumbar spine surgeries for congenital scoliosis presents with back pain s/p mechanical fall from sitting. Found with T12 three column spine fracture-malalignment with fracture extending to the adjacent right 12th posterior rib and left T12-L1 facet joint s/p T9-L3 fusion on 9/29 post-op course c/b recurrent brief Afib discharged to acute rehab where she had RRT 2/2 to sepsis, BCX + MSSA tx to [facility] for further zeinab/      #Sepsis 2/2 to MSSA bacteremia  # Paraspinal collection  #Paroxysmal atrial fibrillation  #HTN  # Depression  # Acute on Chronic back pain      - MRI T/L spine 8.0 x 7.0 x 3.0 (CC x TV x AP) rim-enhancing heterogeneous fluid collection in the dorsal paraspinal through superficial subcutaneous tissues at the T7-L4 levels; 8.0 x 7.0 x 3.0 (CC x TV x AP) rim-enhancing heterogeneous fluid collection in the dorsal paraspinal through   superficial subcutaneous tissues at the T7-L4 levels  -plan for IR guided aspiration of paraspinal fluid collection 10/25 and drainage, f/u cultures, b2 transferrin, if AMS then clamp call NSG, IR    - c/w Ancef 2gm q8hr, repeat BCX 10/23 NGTD  - likely source of sepsis is MSSA bacteremia is paraspinal collection 4/4 +MSSA 10/20, less likely UTI- UCX with E coli and Klebsiella but patient asymptomatic  - TTE 10/23- no vegetations, plan for MICHAEL, appreciate GI recs   - NSG c/s appreciated  - also found to be back in a fib- ekg confirmed- tele monitoring- CHADSVASC is 5- hep gtt- and once done with procedures plan to transition to DO[facility]  - c/w Hydromorphone 2mg PO q8hr for severe pain, senna, Miralax for bowel regimen  - resume home losartan post procedure, increase metoprolol to 25mg BID for more adequate rate control  - c/w tele for now

## 2024-10-25 NOTE — PROGRESS NOTE ADULT - SUBJECTIVE AND OBJECTIVE BOX
****Delores Lara MS3****      Patient is a 73 yo female who presents with sepsis with high fever, rigors, tachycardia on 10/20 found to have MSSA bacteremia.    Interval/Overnight Events:      Subjective: Patient seen and examined at bedside. Appeared lethargic and mildly confused of about last night's events. Afebrile, VSS and on RA. Patient reports feeling well and that back and flank pain has improved since receiving Dilaudid.  Denies chills, edema, palpitations, headaches, abdominal pain. No other pertinent positives or negatives in ROS..      VITAL SIGNS:  T(F): 99.3 (10-24-24 @ 06:25)  HR: 116 (10-24-24 @ 04:13)  BP: 164/75 (10-24-24 @ 04:13)  RR: 18 (10-24-24 @ 04:13)  SpO2: 93% (10-24-24 @ 04:13)  Wt(kg): --    PHYSICAL EXAM:    GENERAL: NAD, non-toxic appearing  EYES: EOMI, PERRLA, conjunctiva and sclera clear  ENT: moist mucous membranes  CV: RRR, normal S1 and S2. No mumurs, rubs, or gallops. +JVD.   LUNGS: Clear to auscultation bilaterally. No rales, rhonchi, or wheezing.   ABDOMEN: Soft, nontender, nondistended, +bowel sounds   NEURO: AOx4, no FND  PSYCH: Appropriate affect  EXTREMITIES: No edema, cyanosis, or clubbing. 2+ peripheral pulses.  LYMPH: No lymphadenopathy noted  SKIN: No rashes or lesions      MEDICATIONS  (STANDING):  acetaminophen     Tablet .. 975 milliGRAM(s) Oral every 8 hours  atorvastatin 40 milliGRAM(s) Oral at bedtime  ceFAZolin   IVPB 2000 milliGRAM(s) IV Intermittent every 8 hours  DULoxetine 20 milliGRAM(s) Oral daily  ergocalciferol 25804 Unit(s) Oral <User Schedule>  heparin  Infusion 1600 Unit(s)/Hr (16 mL/Hr) IV Continuous <Continuous>  melatonin 3 milliGRAM(s) Oral at bedtime  metoprolol tartrate 12.5 milliGRAM(s) Oral every 12 hours  montelukast 10 milliGRAM(s) Oral daily  multivitamin 1 Tablet(s) Oral daily  pantoprazole    Tablet 40 milliGRAM(s) Oral before breakfast  polyethylene glycol 3350 17 Gram(s) Oral two times a day  pramipexole 0.75 milliGRAM(s) Oral at bedtime  pregabalin 100 milliGRAM(s) Oral every 8 hours  senna 2 Tablet(s) Oral at bedtime    MEDICATIONS  (PRN):  cyclobenzaprine 5 milliGRAM(s) Oral three times a day PRN Muscle Spasm  heparin   Injectable 3500 Unit(s) IV Push every 6 hours PRN For aPTT between 40 - 57  heparin   Injectable 7000 Unit(s) IV Push every 6 hours PRN For aPTT less than 40  HYDROmorphone   Tablet 2 milliGRAM(s) Oral every 6 hours PRN Severe Pain (7 - 10)      Allergies    Dilantin (Urticaria)  penicillins (Urticaria)    Intolerances    erythromycin (Stomach Upset; Vomiting; Nausea)      LABS:                        7.7    7.81  )-----------( 332      ( 24 Oct 2024 06:23 )             24.9     10-24    137  |  103  |  16  ----------------------------<  116[H]  3.5   |  21[L]  |  0.59    Ca    8.1[L]      24 Oct 2024 06:27  Phos  2.1     10-24  Mg     1.8     10-24    TPro  5.7[L]  /  Alb  3.0[L]  /  TBili  0.3  /  DBili  x   /  AST  29  /  ALT  38  /  AlkPhos  239[H]  10-24    PTT - ( 24 Oct 2024 06:24 )  PTT:49.4 sec  Urinalysis Basic - ( 24 Oct 2024 06:27 )    Color: x / Appearance: x / SG: x / pH: x  Gluc: 116 mg/dL / Ketone: x  / Bili: x / Urobili: x   Blood: x / Protein: x / Nitrite: x   Leuk Esterase: x / RBC: x / WBC x   Sq Epi: x / Non Sq Epi: x / Bacteria: x        RADIOLOGY & ADDITIONAL TESTS:     ****Delores Lara MS3****      Patient is a 73 yo female who presents with sepsis with high fever, rigors, tachycardia on 10/20 found to have MSSA bacteremia.    Interval/Overnight Events:  Patient entered Afib at 2:56AM and has not yet returned to sinus. NPO since midnight and heparin off since 3AM in preparation for IR procedure.    Subjective: Patient seen and examined at bedside. Appeared alert and in distress due to back pain, requests pain medications. Afebrile, VSS and on RA. Patient reports weakness when standing and walking to the bathroom. Denies chills, edema, palpitations, headaches, abdominal pain. No other pertinent positives or negatives in ROS..      VITAL SIGNS:  T(F): 99.3 (10-24-24 @ 06:25)  HR: 116 (10-24-24 @ 04:13)  BP: 164/75 (10-24-24 @ 04:13)  RR: 18 (10-24-24 @ 04:13)  SpO2: 93% (10-24-24 @ 04:13)  Wt(kg): --    PHYSICAL EXAM:    GENERAL: NAD, non-toxic appearing  EYES: EOMI, PERRLA, conjunctiva and sclera clear  ENT: moist mucous membranes  CV: irregular rate and rhythm, normal S1 and S2. No mumurs, rubs, or gallops.   LUNGS: Clear to auscultation bilaterally. No rales, rhonchi, or wheezing.   ABDOMEN: Soft, nontender, nondistended, +bowel sounds   NEURO: AOx4, no FND  PSYCH: Appropriate affect  EXTREMITIES: No edema, cyanosis, or clubbing. 2+ peripheral pulses.  LYMPH: No lymphadenopathy noted  SKIN: No rashes or lesions      MEDICATIONS  (STANDING):  acetaminophen     Tablet .. 975 milliGRAM(s) Oral every 8 hours  atorvastatin 40 milliGRAM(s) Oral at bedtime  ceFAZolin   IVPB 2000 milliGRAM(s) IV Intermittent every 8 hours  DULoxetine 20 milliGRAM(s) Oral daily  ergocalciferol 71214 Unit(s) Oral <User Schedule>  heparin  Infusion 1600 Unit(s)/Hr (16 mL/Hr) IV Continuous <Continuous>  melatonin 3 milliGRAM(s) Oral at bedtime  metoprolol tartrate 12.5 milliGRAM(s) Oral every 12 hours  montelukast 10 milliGRAM(s) Oral daily  multivitamin 1 Tablet(s) Oral daily  pantoprazole    Tablet 40 milliGRAM(s) Oral before breakfast  polyethylene glycol 3350 17 Gram(s) Oral two times a day  pramipexole 0.75 milliGRAM(s) Oral at bedtime  pregabalin 100 milliGRAM(s) Oral every 8 hours  senna 2 Tablet(s) Oral at bedtime    MEDICATIONS  (PRN):  cyclobenzaprine 5 milliGRAM(s) Oral three times a day PRN Muscle Spasm  heparin   Injectable 3500 Unit(s) IV Push every 6 hours PRN For aPTT between 40 - 57  heparin   Injectable 7000 Unit(s) IV Push every 6 hours PRN For aPTT less than 40  HYDROmorphone   Tablet 2 milliGRAM(s) Oral every 6 hours PRN Severe Pain (7 - 10)      Allergies    Dilantin (Urticaria)  penicillins (Urticaria)    Intolerances    erythromycin (Stomach Upset; Vomiting; Nausea)      LABS:                        7.7    7.81  )-----------( 332      ( 24 Oct 2024 06:23 )             24.9     10-24    137  |  103  |  16  ----------------------------<  116[H]  3.5   |  21[L]  |  0.59    Ca    8.1[L]      24 Oct 2024 06:27  Phos  2.1     10-24  Mg     1.8     10-24    TPro  5.7[L]  /  Alb  3.0[L]  /  TBili  0.3  /  DBili  x   /  AST  29  /  ALT  38  /  AlkPhos  239[H]  10-24    PTT - ( 24 Oct 2024 06:24 )  PTT:49.4 sec  Urinalysis Basic - ( 24 Oct 2024 06:27 )    Color: x / Appearance: x / SG: x / pH: x  Gluc: 116 mg/dL / Ketone: x  / Bili: x / Urobili: x   Blood: x / Protein: x / Nitrite: x   Leuk Esterase: x / RBC: x / WBC x   Sq Epi: x / Non Sq Epi: x / Bacteria: x        RADIOLOGY & ADDITIONAL TESTS: Reviewed     ****Delores Lara MS3****      Patient is a 73 yo female who presents with sepsis with high fever, rigors, tachycardia on 10/20 found to have MSSA bacteremia.    Interval/Overnight Events:  Patient entered Afib at 2:56AM and has not yet returned to sinus. NPO since midnight and heparin off since 3AM in preparation for IR procedure.    Subjective: Patient seen and examined at bedside. Appeared alert and in distress due to back pain, requests pain medications. Afebrile, VSS and on RA. Patient reports weakness when standing and walking to the bathroom but has no other complaints. Denies chills, edema, palpitations, headaches, abdominal pain. No other pertinent positives or negatives in ROS..      VITAL SIGNS:  T(F): 99.3 (10-24-24 @ 06:25)  HR: 116 (10-24-24 @ 04:13)  BP: 164/75 (10-24-24 @ 04:13)  RR: 18 (10-24-24 @ 04:13)  SpO2: 93% (10-24-24 @ 04:13)  Wt(kg): --    PHYSICAL EXAM:    GENERAL: NAD, non-toxic appearing  EYES: EOMI, PERRLA, conjunctiva and sclera clear  ENT: moist mucous membranes  CV: irregular rate and rhythm, No mumurs, rubs, or gallops.   LUNGS: Clear to auscultation bilaterally. No rales, rhonchi, or wheezing.   ABDOMEN: Soft, nontender, nondistended, +bowel sounds   NEURO: AOx4, no FND  PSYCH: Appropriate affect  EXTREMITIES: No edema, cyanosis, or clubbing. 2+ peripheral pulses.  LYMPH: No lymphadenopathy noted  SKIN: No rashes or lesions      MEDICATIONS  (STANDING):  acetaminophen     Tablet .. 975 milliGRAM(s) Oral every 8 hours  atorvastatin 40 milliGRAM(s) Oral at bedtime  ceFAZolin   IVPB 2000 milliGRAM(s) IV Intermittent every 8 hours  DULoxetine 20 milliGRAM(s) Oral daily  ergocalciferol 87404 Unit(s) Oral <User Schedule>  heparin  Infusion 1600 Unit(s)/Hr (16 mL/Hr) IV Continuous <Continuous>  melatonin 3 milliGRAM(s) Oral at bedtime  metoprolol tartrate 12.5 milliGRAM(s) Oral every 12 hours  montelukast 10 milliGRAM(s) Oral daily  multivitamin 1 Tablet(s) Oral daily  pantoprazole    Tablet 40 milliGRAM(s) Oral before breakfast  polyethylene glycol 3350 17 Gram(s) Oral two times a day  pramipexole 0.75 milliGRAM(s) Oral at bedtime  pregabalin 100 milliGRAM(s) Oral every 8 hours  senna 2 Tablet(s) Oral at bedtime    MEDICATIONS  (PRN):  cyclobenzaprine 5 milliGRAM(s) Oral three times a day PRN Muscle Spasm  heparin   Injectable 3500 Unit(s) IV Push every 6 hours PRN For aPTT between 40 - 57  heparin   Injectable 7000 Unit(s) IV Push every 6 hours PRN For aPTT less than 40  HYDROmorphone   Tablet 2 milliGRAM(s) Oral every 6 hours PRN Severe Pain (7 - 10)      Allergies    Dilantin (Urticaria)  penicillins (Urticaria)    Intolerances    erythromycin (Stomach Upset; Vomiting; Nausea)      LABS:                        7.7    7.81  )-----------( 332      ( 24 Oct 2024 06:23 )             24.9     10-24    137  |  103  |  16  ----------------------------<  116[H]  3.5   |  21[L]  |  0.59    Ca    8.1[L]      24 Oct 2024 06:27  Phos  2.1     10-24  Mg     1.8     10-24    TPro  5.7[L]  /  Alb  3.0[L]  /  TBili  0.3  /  DBili  x   /  AST  29  /  ALT  38  /  AlkPhos  239[H]  10-24    PTT - ( 24 Oct 2024 06:24 )  PTT:49.4 sec  Urinalysis Basic - ( 24 Oct 2024 06:27 )    Color: x / Appearance: x / SG: x / pH: x  Gluc: 116 mg/dL / Ketone: x  / Bili: x / Urobili: x   Blood: x / Protein: x / Nitrite: x   Leuk Esterase: x / RBC: x / WBC x   Sq Epi: x / Non Sq Epi: x / Bacteria: x        RADIOLOGY & ADDITIONAL TESTS: Reviewed

## 2024-10-25 NOTE — PROCEDURE NOTE - PLAN
Maintain to gravity drainage  Record output daily  Flush with 5ml normal saline daily  Change dressing q3 days or when soiled  If patient develops new neurological symptoms such as headache, dizziness, AMS please clamp drain and call neurosurgery for further imaging and recommendations.

## 2024-10-25 NOTE — PROGRESS NOTE ADULT - SUBJECTIVE AND OBJECTIVE BOX
Follow Up:      Patient now s/p IR aspiration of spinal fluid collection today. As per op note, "Successful placement of 10.2 F MPD in the paraspinal fluid collection. 15ml fluid aspirate (tea cloudy fluid) sent to lab for analysis, culture and beta 2 transferrin."  Overall feeling well without complaints.       REVIEW OF SYSTEMS  Constitutional: No  fevers, No chills  Skin: No rash, no phlebitis	  Respiratory: No cough, no SOB  Cardiovascular:  No chest pain, No palpitations   Gastrointestinal: No pain, No nausea, No vomiting, no diarrhea, No constipation	  Genitourinary: No dysuria, No frequency, No hesitancy, No flank pain  MSK: No Joint pain, + back pain, No edema    Allergies  Dilantin (Urticaria)  penicillins (Urticaria)        ANTIMICROBIALS:    ceFAZolin   IVPB 2000 every 8 hours  erythromycin    ethylsuccinate Suspension 40 mG/mL 128 every 12 hours      OTHER MEDS: MEDICATIONS  (STANDING):  acetaminophen     Tablet .. 975 every 8 hours  atorvastatin 40 at bedtime  cyclobenzaprine 5 three times a day PRN  DULoxetine 20 daily  heparin   Injectable 7000 every 6 hours PRN  heparin   Injectable 3500 every 6 hours PRN  HYDROmorphone   Tablet 2 every 8 hours PRN  melatonin 3 at bedtime  metoprolol tartrate 25 two times a day  montelukast 10 daily  polyethylene glycol 3350 17 two times a day  pramipexole 0.75 <User Schedule>  pregabalin 100 every 8 hours  senna 2 at bedtime      Vital Signs Last 24 Hrs  T(F): 99.6 (10-25-24 @ 10:01), Max: 103.9 (10-20-24 @ 12:46)    Vital Signs Last 24 Hrs  HR: 125 (10-25-24 @ 10:01) (96 - 125)  BP: 125/78 (10-25-24 @ 10:01) (125/78 - 146/77)  RR: 18 (10-25-24 @ 10:01)  SpO2: 96% (10-25-24 @ 10:01) (94% - 97%)  Wt(kg): --    EXAM:  General: Patient appears comfortable, no acute distress  HEENT: NCAT, PERRL, anicteric sclera, mucous membranes moist and intact  CV: +irregularly irregular.   Lungs: No respiratory distress, CTA b/l, no wheezing, rales or rhonchi  Abd:  BS4+, Soft, NTND, no guarding  : No suprapubic tenderness  Neuro: AAOx3. No focal deficits noted.   Back: +well healing surgical scar midline from thoracic to lumbar region. Dressing overlying lower spine with drain in place (~50 ccs of orange fluid)   Ext: No cyanosis, no edema  Msk: freely moving upper and lower extremities  Labs:                        7.7    7.82  )-----------( 335      ( 25 Oct 2024 04:01 )             25.3     10-25    141  |  107  |  14  ----------------------------<  109[H]  4.0   |  21[L]  |  0.63    Ca    8.5      25 Oct 2024 04:01  Phos  2.6     10-25  Mg     1.9     10-25    TPro  6.0  /  Alb  3.1[L]  /  TBili  0.3  /  DBili  x   /  AST  27  /  ALT  30  /  AlkPhos  240[H]  10-25      WBC Trend:  WBC Count: 7.82 (10-25-24 @ 04:01)  WBC Count: 7.81 (10-24-24 @ 06:23)  WBC Count: 8.52 (10-23-24 @ 21:55)  WBC Count: 8.36 (10-23-24 @ 08:26)      Creatine Trend:  Creatinine: 0.63 (10-25)  Creatinine: 0.59 (10-24)  Creatinine: 0.67 (10-23)  Creatinine: 0.79 (10-21)      Liver Biochemical Testing Trend:  Alanine Aminotransferase (ALT/SGPT): 30 (10-25)  Alanine Aminotransferase (ALT/SGPT): 38 (10-24)  Alanine Aminotransferase (ALT/SGPT): 43 (10-23)  Alanine Aminotransferase (ALT/SGPT): 33 (10-21)  Alanine Aminotransferase (ALT/SGPT): 43 (10-20)  Aspartate Aminotransferase (AST/SGOT): 27 (10-25-24 @ 04:01)  Aspartate Aminotransferase (AST/SGOT): 29 (10-24-24 @ 06:27)  Aspartate Aminotransferase (AST/SGOT): 42 (10-23-24 @ 08:26)  Aspartate Aminotransferase (AST/SGOT): 23 (10-21-24 @ 06:20)  Aspartate Aminotransferase (AST/SGOT): 24 (10-20-24 @ 13:14)  Bilirubin Total: 0.3 (10-25)  Bilirubin Total: 0.3 (10-24)  Bilirubin Total: 0.6 (10-23)  Bilirubin Total: 0.8 (10-21)  Bilirubin Total: 0.6 (10-20)      Trend LDH      Urinalysis Basic - ( 25 Oct 2024 04:01 )    Color: x / Appearance: x / SG: x / pH: x  Gluc: 109 mg/dL / Ketone: x  / Bili: x / Urobili: x   Blood: x / Protein: x / Nitrite: x   Leuk Esterase: x / RBC: x / WBC x   Sq Epi: x / Non Sq Epi: x / Bacteria: x        MICROBIOLOGY:  Vancomycin Level, Trough: 15.3 (10-22 @ 09:24)    MRSA PCR Result.: NotDetec (11-13-23 @ 12:51)      Culture - Blood (collected 23 Oct 2024 09:55)  Source: .Blood BLOOD  Preliminary Report:    No growth at 24 hours    Culture - Blood (collected 23 Oct 2024 09:40)  Source: .Blood BLOOD  Preliminary Report:    No growth at 24 hours    Culture - Blood (collected 21 Oct 2024 18:02)  Source: .Blood BLOOD  Preliminary Report:    No growth at 72 Hours    Culture - Blood (collected 21 Oct 2024 17:58)  Source: .Blood BLOOD  Preliminary Report:    No growth at 72 Hours    Culture - Urine (collected 20 Oct 2024 17:15)  Source: Clean Catch Clean Catch (Midstream)  Final Report:    >100,000 CFU/ml Escherichia coli    >100,000 CFU/ml Klebsiella aerogenes (Previously Enterobacter)  Organism: Escherichia coli  Klebsiella aerogenes (Previously Enterobacter)  Organism: Klebsiella aerogenes (Previously Enterobacter)    Sensitivities:      Method Type: TAYA      -  Amoxicillin/Clavulanic Acid: R >16/8      -  Ampicillin: R >16 These ampicillin results predict results for amoxicillin      -  Ampicillin/Sulbactam: R 8/4      -  Aztreonam: S <=4      -  Cefazolin: R >16      -  Cefepime: S <=2      -  Cefoxitin: R >16      -  Ceftriaxone: S <=1 Enterobacter cloacae, Klebsiella aerogenes, and Citrobacter freundii may develop resistance during prolonged therapy.      -  Ciprofloxacin: S <=0.25      -  Ertapenem: S <=0.5      -  Gentamicin: S <=2      -  Imipenem: S <=1      -  Levofloxacin: S <=0.5      -  Meropenem: S <=1      -  Nitrofurantoin: I 64 Should not be used to treat pyelonephritis      -  Piperacillin/Tazobactam: S <=8 Treatment with Pipercillin/Tazobactam is not recommended in severe infections casued by Klebsiella aerogenes, Enterobacter cloacae complex, and Citrobacter freundii complex.      -  Tobramycin: S <=2      -  Trimethoprim/Sulfamethoxazole: S <=0.5/9.5  Organism: Escherichia coli    Sensitivities:      Method Type: TAYA      -  Amoxicillin/Clavulanic Acid: S <=8/4      -  Ampicillin: S <=8 These ampicillin results predict results for amoxicillin      -  Ampicillin/Sulbactam: S <=4/2      -  Aztreonam: S <=4      -  Cefazolin: S <=2 For uncomplicated UTI with K. pneumoniae, E. coli, or P. mirablis: TAYA <=16 is sensitive and TAYA >=32 is resistant. This also predicts results for oral agents cefaclor, cefdinir, cefpodoxime, cefprozil, cefuroxime axetil, cephalexin and locarbef for uncomplicated UTI. Note that some isolates may be susceptible to these agents while testing resistant to cefazolin.      -  Cefepime: S <=2      -  Cefoxitin: S <=8      -  Ceftriaxone: S <=1      -  Cefuroxime: S <=4      -  Ciprofloxacin: S <=0.25      -  Ertapenem: S <=0.5      -  Gentamicin: S <=2      -  Imipenem: S <=1      -  Levofloxacin: S <=0.5      -  Meropenem: S <=1      -  Nitrofurantoin: S <=32 Should not be used to treat pyelonephritis      -  Piperacillin/Tazobactam: S <=8      -  Tobramycin: S <=2      -  Trimethoprim/Sulfamethoxazole: S <=0.5/9.5    Culture - Blood (collected 20 Oct 2024 13:14)  Source: .Blood BLOOD  Final Report:    Growth in aerobic and anaerobic bottles: Staphylococcus aureus    See previous culture [id number]    Culture - Blood (collected 20 Oct 2024 13:07)  Source: .Blood BLOOD  Final Report:    Growth in aerobic and anaerobic bottles: Staphylococcus aureus    Direct identification is available within approximately 3-5    hours either by Blood Panel Multiplexed PCR or Direct    MALDI-TOF. Details: [email or web address]  Organism: Blood Culture PCR  Staphylococcus aureus  Organism: Staphylococcus aureus    Sensitivities:      Method Type: TAYA      -  Clindamycin: R <=0.25 This isolate is presumed to be clindamycin resistant based on detection of inducible resistance. Clindamycin may still be effective in some patients.      -  Erythromycin: R >4      -  Gentamicin: S <=1 Should not be used as monotherapy      -  Oxacillin: S <=0.25 Oxacillin predicts susceptibility for dicloxacillin, methicillin, and nafcillin      -  Penicillin: R >8      -  Rifampin: S <=1 Should not be used as monotherapy      -  Tetracycline: S <=1      -  Trimethoprim/Sulfamethoxazole: S <=0.5/9.5      -  Vancomycin: S 1  Organism: Blood Culture PCR    Sensitivities:      Method Type: PCR      -  Methicillin SENSITIVE Staphylococcus aureus (MSSA): Detec Any isolate of Staphylococcus aureus from a blood culture is NOT considered a contaminant.    Culture - Urine (collected 29 Jan 2024 02:13)  Source: Clean Catch Clean Catch (Midstream)  Final Report:    >=3 organisms. Probable collection contamination.    Culture - Urine (collected 06 Oct 2023 19:53)  Source: Clean Catch Clean Catch (Midstream)  Final Report:    <10,000 CFU/mL Normal Urogenital Hoa                          COVID-19 PCR: NotDetec (10-21-24 @ 09:20)  COVID-19 PCR: NotDetec (10-17-24 @ 16:34)        C-Reactive Protein: 183 (10-21)              Lactate, Blood: 1.6 (10-23 @ 08:26)      RADIOLOGY:    ACC: [id number] EXAM:  MR SPINE THORACIC WAW IC   ORDERED BY:  BALBINA DAVIS     ACC: [id number] EXAM:  MR SPINE LUMBAR WAW IC   ORDERED BY:  BALBINA DAVIS     PROCEDURE DATE:  10/23/2024          INTERPRETATION:  CLINICAL INFORMATION: Evaluate for spinal abscess. MSSA   bacteremia status post thoracolumbar fusion.    TECHNIQUE: Multiplanar, multisequence MRI was performed of the thoracic   and lumbar spine, WITHOUT and WITH intravenous contrast.  IV CONTRAST: IV contrast documented in unlinked concurrent exam   (accession [id number]), Gadavist (accession [id number])  10 cc administered     0 cc discarded    PRIOR STUDIES: CT thoracic and lumbar spine 10/21/2024.    FINDINGS:    Multiple images degraded by motion artifact and artifact related to lower   thoracic-lumbar fusion hardware partially obscures the regional   structures.    MRI THORACIC SPINE    Degenerative change visualized lower cervical spine, including multiple   disc bulges and/or protrusions, incompletely evaluated on this exam.    No gross cord edema or atrophy, within the limitations of this   examination. Likewise, no definitive intrathecal or paraspinal mass is   evident. Bilateral pleural effusions again noted.    No evidence of diffuse marrow replacement process, newfracture or   subluxation. Multilevel upper thoracic disc space narrowing again noted.   Varying degrees of diffuse disc desiccation with multilevel endplate   spurring.    T1-T2: No disc protrusion or stenosis.  T2-T3: Right paracentral disc protrusion superimposed upon a disc bulge,   effacing the subarachnoid space and flattening the ventral cord,   resulting in mild central canal stenosis.  T3-T4: Disc bulge, without stenosis.  T4-T5: Right paracentral disc protrusion superimposed upon a discbulge,   effacing the subarachnoid space and flattening the ventral cord,   resulting in mild central canal stenosis.  T5-T6: Disc bulge, without stenosis.  T6-T7: No disc protrusion or stenosis.  T7-T8: No disc protrusion or stenosis.  T8-T9: No disc protrusion or stenosis.  T9-T10: No disc protrusion or stenosis.  T10-T11: No disc protrusion or stenosis.  T11-T12: No disc protrusion or stenosis.  T12-L1: Largely obscured by artifact related to posterior fusion and   corpectomy hardware.    No evidence of thoracic discitis-osteomyelitis. No gross epidural   abscess, enhancing mass or abnormal leptomeningeal enhancement within the   limitations of this exam.    MRI LUMBAR SPINE    Partially imaged widening of bilateral sacroiliac joints againnoted,   incompletely evaluated on this exam.    No gross intrathecal or paraspinal mass. Heterogeneous marrow signal can   be seen in the setting of osteoporosis as well as chronic anemia. No   evidence of new, acute fracture. Stable grade 1 anterolisthesis L5 over   S1.    Again seen are varying degrees of fusion of the lumbar vertebral bodies   and posterior elements. No definitive lumbar disc protrusion, central   canal and neural foramen stenosis visualized.    No evidence of lumbar discitis-osteomyelitis. No gross epidural abscess,   enhancing mass or abnormal leptomeningeal enhancement within the   limitations of this exam.    OTHER    Again seen is posterior fusion hardware extending from the T9-L3 levels,   inclusive, with a T12 corpectomy device again noted. At the corpectomy   level on the left, there is a lobulated focus of partially obscured   probable epidural signal, suboptimally evaluated secondary to surrounding   artifact, however which may reflect postsurgical/packing material.    In the dorsal paraspinal through superficial subcutaneous tissues,   extending from the approximate T7-L4 levels, inclusive, there is an   approximate 28.0 x 7.0 x 3.0 (CC x TV x AP) vertically oriented   peripherally enhancing heterogeneous fluid collection, encompassing the   left dorsal paraspinal hardware centered at the T9 level.    IMPRESSION:  1. Approximate 28.0 x 7.0 x 3.0 (CC x TV x AP) rim-enhancing   heterogeneous fluid collection in the dorsal paraspinal through   superficial subcutaneous tissues at the T7-L4 levels, inclusive,   encompassing the left dorsal paraspinal hardware centered at the T9   level. While diagnostic considerations include seroma as well as   hematoma, superimposed infection (abscess) may have an indistinguishable   appearance. Consider fluid sampling.  2. No evidence of thoracolumbar discitis-osteomyelitis. No gross epidural   abscess within the limitations of this exam.  3. Additional findings, including those postsurgical, described in detail   above.    --- End of Report ---            BALBINA DOVER M.D., ATTENDING RADIOLOGIST  This document has been electronically signed. Oct 24 2024 10:41AM

## 2024-10-25 NOTE — PROGRESS NOTE ADULT - PROBLEM SELECTOR PLAN 2
- 10/7 cardiology consult for Afib, patient converted back to SR. if AF is persistent will need AC  - 10/23 ECG and exam notable for irregular rate and rhythm    Plan  - Continue to monitor on telemetry  - c/w Metoprolol to 12.5 BID  - Inc heparin gtt 3500mg IV q6 for anticoagulation  - Consulted cardiology, rec to replete electrolytes and treat sepsis before addressing Afib, f/u reccs - 10/7 cardiology consult for Afib, patient converted back to SR. if AF is persistent will need AC  - 10/25 ECG and exam notable for irregular rate and rhythm      Plan  - Increase Metoprolol to 20 BID for rate control  - Continue to monitor on telemetry  - c/w heparin gtt 3500mg IV q6 for anticoagulation  - Consulted cardiology, rec to replete electrolytes and treat sepsis before addressing Afib, f/u reccs  - Consulted outpt cardiology Dr. Copeland, f/u reccs - 10/7 cardiology consult for Afib, patient converted back to SR. if AF is persistent will need AC  - 10/25 ECG and exam notable for irregular rate and rhythm      Plan  - Increase Metoprolol to 20 BID for rate control  - Continue to monitor on telemetry  - c/w heparin gtt 3500mg IV q6 for anticoagulation  - Consulted cardiology, rec to replete electrolytes and treat sepsis before addressing Afib, f/u reccs  - Consulted outpt cardiology Dr. Copeland, f/u reccs. Multiple messages left for Dr. Copeland

## 2024-10-25 NOTE — PROGRESS NOTE ADULT - PROBLEM SELECTOR PLAN 5
- S/p PRBCs intraoperatively   - Hb stable with no evidence of active bleed at this time   - 10/23 Hb 8.8 Hct 29.7  - 10/24 Hb 7.7 Hct 24.9    Plan  - Monitor H/H - S/p PRBCs intraoperatively   - Hb stable with no evidence of active bleed at this time   - 10/23 Hb 8.8 Hct 29.7  - 10/24 Hb 7.7 Hct 24.9  - 10/25 Hb 7.7 Hct 25.3    Plan  - Monitor H/H to possibly give 1 unit pRBCs post procedures - S/p PRBCs intraoperatively   - Hb stable with no evidence of active bleed at this time   - 10/23 Hb 8.8 Hct 29.7  - 10/24 Hb 7.7 Hct 24.9  - 10/25 Hb 7.7 Hct 25.3    Plan  - Monitor H/H to possibly give 1 unit pRBCs post procedures. Pt oozing blood into collection?

## 2024-10-26 LAB
ALBUMIN SERPL ELPH-MCNC: 3.2 G/DL — LOW (ref 3.3–5)
ALP SERPL-CCNC: 247 U/L — HIGH (ref 40–120)
ALT FLD-CCNC: 25 U/L — SIGNIFICANT CHANGE UP (ref 10–45)
ANION GAP SERPL CALC-SCNC: 12 MMOL/L — SIGNIFICANT CHANGE UP (ref 5–17)
APTT BLD: 57.3 SEC — HIGH (ref 24.5–35.6)
APTT BLD: 80.4 SEC — HIGH (ref 24.5–35.6)
AST SERPL-CCNC: 26 U/L — SIGNIFICANT CHANGE UP (ref 10–40)
BASOPHILS # BLD AUTO: 0.02 K/UL — SIGNIFICANT CHANGE UP (ref 0–0.2)
BASOPHILS NFR BLD AUTO: 0.3 % — SIGNIFICANT CHANGE UP (ref 0–2)
BILIRUB SERPL-MCNC: 0.3 MG/DL — SIGNIFICANT CHANGE UP (ref 0.2–1.2)
BUN SERPL-MCNC: 15 MG/DL — SIGNIFICANT CHANGE UP (ref 7–23)
CALCIUM SERPL-MCNC: 8.6 MG/DL — SIGNIFICANT CHANGE UP (ref 8.4–10.5)
CHLORIDE SERPL-SCNC: 106 MMOL/L — SIGNIFICANT CHANGE UP (ref 96–108)
CO2 SERPL-SCNC: 22 MMOL/L — SIGNIFICANT CHANGE UP (ref 22–31)
CREAT SERPL-MCNC: 0.59 MG/DL — SIGNIFICANT CHANGE UP (ref 0.5–1.3)
EGFR: 93 ML/MIN/1.73M2 — SIGNIFICANT CHANGE UP
EOSINOPHIL # BLD AUTO: 0 K/UL — SIGNIFICANT CHANGE UP (ref 0–0.5)
EOSINOPHIL NFR BLD AUTO: 0 % — SIGNIFICANT CHANGE UP (ref 0–6)
GLUCOSE SERPL-MCNC: 144 MG/DL — HIGH (ref 70–99)
HCT VFR BLD CALC: 28.2 % — LOW (ref 34.5–45)
HGB BLD-MCNC: 8.3 G/DL — LOW (ref 11.5–15.5)
IMM GRANULOCYTES NFR BLD AUTO: 1.8 % — HIGH (ref 0–0.9)
LYMPHOCYTES # BLD AUTO: 1.15 K/UL — SIGNIFICANT CHANGE UP (ref 1–3.3)
LYMPHOCYTES # BLD AUTO: 14.6 % — SIGNIFICANT CHANGE UP (ref 13–44)
MAGNESIUM SERPL-MCNC: 2.3 MG/DL — SIGNIFICANT CHANGE UP (ref 1.6–2.6)
MCHC RBC-ENTMCNC: 26.3 PG — LOW (ref 27–34)
MCHC RBC-ENTMCNC: 29.4 GM/DL — LOW (ref 32–36)
MCV RBC AUTO: 89.5 FL — SIGNIFICANT CHANGE UP (ref 80–100)
MONOCYTES # BLD AUTO: 0.21 K/UL — SIGNIFICANT CHANGE UP (ref 0–0.9)
MONOCYTES NFR BLD AUTO: 2.7 % — SIGNIFICANT CHANGE UP (ref 2–14)
NEUTROPHILS # BLD AUTO: 6.33 K/UL — SIGNIFICANT CHANGE UP (ref 1.8–7.4)
NEUTROPHILS NFR BLD AUTO: 80.6 % — HIGH (ref 43–77)
NRBC # BLD: 0 /100 WBCS — SIGNIFICANT CHANGE UP (ref 0–0)
PHOSPHATE SERPL-MCNC: 2.4 MG/DL — LOW (ref 2.5–4.5)
PLATELET # BLD AUTO: 375 K/UL — SIGNIFICANT CHANGE UP (ref 150–400)
POTASSIUM SERPL-MCNC: 4.3 MMOL/L — SIGNIFICANT CHANGE UP (ref 3.5–5.3)
POTASSIUM SERPL-SCNC: 4.3 MMOL/L — SIGNIFICANT CHANGE UP (ref 3.5–5.3)
PROT SERPL-MCNC: 6.4 G/DL — SIGNIFICANT CHANGE UP (ref 6–8.3)
RBC # BLD: 3.15 M/UL — LOW (ref 3.8–5.2)
RBC # FLD: 15.6 % — HIGH (ref 10.3–14.5)
SODIUM SERPL-SCNC: 140 MMOL/L — SIGNIFICANT CHANGE UP (ref 135–145)
WBC # BLD: 7.85 K/UL — SIGNIFICANT CHANGE UP (ref 3.8–10.5)
WBC # FLD AUTO: 7.85 K/UL — SIGNIFICANT CHANGE UP (ref 3.8–10.5)

## 2024-10-26 PROCEDURE — 99232 SBSQ HOSP IP/OBS MODERATE 35: CPT | Mod: GC

## 2024-10-26 RX ORDER — POTASSIUM PHOSPHATE 236; 224 MG/ML; MG/ML
30 INJECTION, SOLUTION INTRAVENOUS ONCE
Refills: 0 | Status: COMPLETED | OUTPATIENT
Start: 2024-10-26 | End: 2024-10-26

## 2024-10-26 RX ADMIN — Medication 128 MILLIGRAM(S): at 18:57

## 2024-10-26 RX ADMIN — PRAMIPEXOLE DIHYDROCHLORIDE 0.75 MILLIGRAM(S): 0.12 TABLET ORAL at 18:33

## 2024-10-26 RX ADMIN — CYCLOBENZAPRINE HYDROCHLORIDE 5 MILLIGRAM(S): 30 CAPSULE, EXTENDED RELEASE ORAL at 00:39

## 2024-10-26 RX ADMIN — PREGABALIN 100 MILLIGRAM(S): 150 CAPSULE ORAL at 22:00

## 2024-10-26 RX ADMIN — Medication 25 MILLIGRAM(S): at 18:27

## 2024-10-26 RX ADMIN — CYCLOBENZAPRINE HYDROCHLORIDE 5 MILLIGRAM(S): 30 CAPSULE, EXTENDED RELEASE ORAL at 22:01

## 2024-10-26 RX ADMIN — Medication 25 MILLIGRAM(S): at 07:47

## 2024-10-26 RX ADMIN — Medication 2 MILLIGRAM(S): at 23:00

## 2024-10-26 RX ADMIN — Medication 40 MILLIGRAM(S): at 22:01

## 2024-10-26 RX ADMIN — Medication 975 MILLIGRAM(S): at 22:00

## 2024-10-26 RX ADMIN — MONTELUKAST SODIUM 10 MILLIGRAM(S): 10 TABLET, FILM COATED ORAL at 13:47

## 2024-10-26 RX ADMIN — Medication 1 TABLET(S): at 13:50

## 2024-10-26 RX ADMIN — POLYETHYLENE GLYCOL 3350 17 GRAM(S): 17 POWDER, FOR SOLUTION ORAL at 18:27

## 2024-10-26 RX ADMIN — PREGABALIN 100 MILLIGRAM(S): 150 CAPSULE ORAL at 13:47

## 2024-10-26 RX ADMIN — Medication 2 MILLIGRAM(S): at 22:00

## 2024-10-26 RX ADMIN — Medication 975 MILLIGRAM(S): at 13:48

## 2024-10-26 RX ADMIN — CYCLOBENZAPRINE HYDROCHLORIDE 5 MILLIGRAM(S): 30 CAPSULE, EXTENDED RELEASE ORAL at 07:48

## 2024-10-26 RX ADMIN — HEPARIN SODIUM 16 UNIT(S)/HR: 10000 INJECTION INTRAVENOUS; SUBCUTANEOUS at 19:23

## 2024-10-26 RX ADMIN — POTASSIUM PHOSPHATE 83.33 MILLIMOLE(S): 236; 224 INJECTION, SOLUTION INTRAVENOUS at 12:03

## 2024-10-26 RX ADMIN — PREGABALIN 100 MILLIGRAM(S): 150 CAPSULE ORAL at 07:48

## 2024-10-26 RX ADMIN — POLYETHYLENE GLYCOL 3350 17 GRAM(S): 17 POWDER, FOR SOLUTION ORAL at 07:51

## 2024-10-26 RX ADMIN — Medication 100 MILLIGRAM(S): at 07:50

## 2024-10-26 RX ADMIN — DULOXETINE HYDROCHLORIDE 20 MILLIGRAM(S): 30 CAPSULE, DELAYED RELEASE ORAL at 13:50

## 2024-10-26 RX ADMIN — Medication 100 MILLIGRAM(S): at 13:49

## 2024-10-26 RX ADMIN — Medication 128 MILLIGRAM(S): at 07:50

## 2024-10-26 RX ADMIN — Medication 975 MILLIGRAM(S): at 15:16

## 2024-10-26 RX ADMIN — Medication 100 MILLIGRAM(S): at 22:09

## 2024-10-26 RX ADMIN — Medication 975 MILLIGRAM(S): at 07:48

## 2024-10-26 NOTE — PROGRESS NOTE ADULT - ASSESSMENT
- tap showing gram pos cocci  - pending tap cx,  - blood cx have been neg since 10/21 while on Ancef  - given incision remains clean/dry/intact, exam intact, wbc 8, afebrile, cont trial of abx and draw cxs from drain q24 hr to see if fluid clears for now  - fu on sensitivities

## 2024-10-26 NOTE — PROGRESS NOTE ADULT - PROBLEM SELECTOR PLAN 5
- S/p PRBCs intraoperatively   - Hb stable with no evidence of active bleed at this time   - 10/23 Hb 8.8 Hct 29.7  - 10/24 Hb 7.7 Hct 24.9  - 10/25 Hb 7.7 Hct 25.3    Plan  - Monitor H/H to possibly give 1 unit pRBCs post procedures. Pt oozing blood into collection?

## 2024-10-26 NOTE — PROGRESS NOTE ADULT - PROBLEM SELECTOR PLAN 2
- 10/7 cardiology consult for Afib, patient converted back to SR. if AF is persistent will need AC  - 10/25 ECG and exam notable for irregular rate and rhythm      Plan  - Increase Metoprolol to 20 BID for rate control  - Continue to monitor on telemetry  - c/w heparin gtt 3500mg IV q6 for anticoagulation  - Consulted cardiology, rec to replete electrolytes and treat sepsis before addressing Afib, f/u reccs  - Consulted outpt cardiology Dr. Copeland, f/u reccs. Multiple messages left for Dr. Copeland - 10/7 cardiology consult for Afib, patient converted back to SR. if AF is persistent will need AC  - 10/25 ECG and exam notable for irregular rate and rhythm  - Increase Metoprolol to 20 BID for rate control  - Continue to monitor on telemetry  - c/w heparin gtt 3500mg IV q6 for anticoagulation  - Consulted cardiology, rec to replete electrolytes and treat sepsis before addressing Afib, f/u reccs  - Consulted outpt cardiology Dr. Copeland, f/u reccs. Multiple messages left for Dr. Copeland

## 2024-10-26 NOTE — PROGRESS NOTE ADULT - PROBLEM SELECTOR PLAN 4
- 10/1 TTE: EF 61%  - S/P 1L IVF bolus (10/13) for symptomatic hypotension  - /66  -> 146/77 -> 141/90    Plan  - Increase Metoprolol to 25 BID  - c/w atorvastatin 40mg PO qd  - Restart Losartan 100 mg daily and Chlorthalidone 25 mg daily (restart if SBP is consistently above 140 per cardio)  - HOLD midodrine 7.5 mg AM

## 2024-10-26 NOTE — PROVIDER CONTACT NOTE (CRITICAL VALUE NOTIFICATION) - ASSESSMENT
Patient A&Ox 4 , VSS. Patient on RA. no events on tele. No complaints of discomfort, dizziness or chest pain.
Patient A&Ox 4 , VSS. Patient on RA. no events on tele. No complaints of discomfort, dizziness or chest pain.

## 2024-10-26 NOTE — PROGRESS NOTE ADULT - ATTENDING COMMENTS
73 yo F with PMH of HTN, gastroparesis, peripheral neuropathy, multiple prior thoracolumbar spine surgeries for congenital scoliosis presents with back pain s/p mechanical fall from sitting. Found with T12 three column spine fracture-malalignment with fracture extending to the adjacent right 12th posterior rib and left T12-L1 facet joint s/p T9-L3 fusion on 9/29 post-op course c/b recurrent brief Afib discharged to acute rehab where she had RRT 2/2 to sepsis, BCX + MSSA tx to [facility] for further eval      #Sepsis 2/2 to MSSA bacteremia  # Paraspinal collection  #Paroxysmal atrial fibrillation  #HTN  # Depression  # Acute on Chronic back pain      - MRI T/L spine 8.0 x 7.0 x 3.0 (CC x TV x AP) rim-enhancing heterogeneous fluid collection in the dorsal paraspinal through superficial subcutaneous tissues at the T7-L4 levels; 8.0 x 7.0 x 3.0 (CC x TV x AP) rim-enhancing heterogeneous fluid collection in the dorsal paraspinal through   superficial subcutaneous tissues at the T7-L4 levels  -s/p IR guided aspiration of paraspinal fluid collection 10/25 and drainage, f/u cultures, b2 transferrin, if AMS then clamp call NSG, IR    - c/w Ancef 2gm q8hr, repeat BCX 10/23 NGTD  - likely source of sepsis is MSSA bacteremia is paraspinal collection 4/4 +MSSA 10/20, less likely UTI- UCX with E coli and Klebsiella but patient asymptomatic  - TTE 10/23- no vegetations, plan for MICHAEL, appreciate GI recs   - NSG c/s appreciated  - also found to be back in a fib- ekg confirmed- tele monitoring[facility]- and once done with procedures plan to transition to DOAC  - c/w Hydromorphone 2mg PO q8hr for severe pain, senna, Miralax for bowel regimen; requiring less pain medication post-drainage  - resume home losartan post procedure, increase metoprolol to 25mg BID for more adequate rate control  - c/w tele for now

## 2024-10-26 NOTE — PROGRESS NOTE ADULT - PROBLEM SELECTOR PLAN 1
Patient presents with sepsis with high fever, rigors, tachycardia on 10/20 found to have MSSA bacteremia.  - 10/20 Bcx MSSA, 10/21 no growth at 24hrs  - CXR with old rib fracture and small RUL infiltrate  - 10/20 +UA for Ecoli, follow urine culture  - lactic acidosis resolved, lactate 1.6 10/23  - 10/23 Na 134 K 3.3 Phos 1.5  - MRI: rim-enhancing heterogeneous fluid collection suspicious for an abscess  - TTE: no evidence of vegetations    Plan  - Neurosurg consulted and following, no neurosurgical contraindication to drain fluid w IR today in addition to sending aspirate for cultures, f/u reccs  - ID consulted, requests culture and gram stain fluid  - IR consulted, approved for paraspinal collection aspiration today 10/25   - MICHAEL scheduled for 10/25, NPO at midnight  - Repeat daily Bcx  - Continue to monitor electrolytes  - c/w cefazolin 2000mg IV q8  - Monitor WBC and fever curve Patient presents with sepsis with high fever, rigors, tachycardia on 10/20 found to have MSSA bacteremia.  - 10/20 Bcx MSSA, 10/21 no growth at 24hrs  - CXR with old rib fracture and small RUL infiltrate  - 10/20 +UA for Ecoli, follow urine culture  - lactic acidosis resolved, lactate 1.6 10/23  - 10/23 Na 134 K 3.3 Phos 1.5  - MRI: rim-enhancing heterogeneous fluid collection suspicious for an abscess  - TTE: no evidence of vegetations    Plan  - Neurosurg consulted and following, no neurosurgical contraindication to drain fluid w IR today in addition to sending aspirate for cultures, f/u reccs  - ID consulted, requests culture and gram stain fluid  - IR consulted, approved for paraspinal collection aspiration 10/25   - MICHAEL scheduled for 10/25, NPO at midnight  - Repeat daily Bcx  - Continue to monitor electrolytes  - Monitor WBC and fever curve  - s/p abscess drainage, culture growing GPC in pairs   - c/w cefazolin 2000mg IV q8

## 2024-10-26 NOTE — PROVIDER CONTACT NOTE (CRITICAL VALUE NOTIFICATION) - BACKGROUND
74-year-old female patient with past medical history of OA, HTN, gastroparesis, peripheral neuropathy, multiple prior thoracolumbar spine surgeries for congenital scoliosis Patient was febrile to 103, tachycardic, and subjective fever/chills. Sepsis w/up initiated and found to have small right upper lobe infiltrate, UTI, and MSSA bacteremia
74-year-old female patient with past medical history of OA, HTN, gastroparesis, peripheral neuropathy, multiple prior thoracolumbar spine surgeries for congenital scoliosis Patient was febrile to 103, tachycardic, and subjective fever/chills. Sepsis w/up initiated and found to have small right upper lobe infiltrate, UTI, and MSSA bacteremia.

## 2024-10-26 NOTE — PROGRESS NOTE ADULT - ASSESSMENT
74-year-old female patient with past medical history of OA, HTN, gastroparesis, peripheral neuropathy, multiple prior thoracolumbar spine surgeries for congenital scoliosis [place]1991) and recent fall with T11-T12 fracture, s/p thoracolumbar posterior fusion for t spine fx 9/29, c/b post op csf leak, sent to rehab on 10/17, c/b sepsis with high fever, rigors, tachycardia on 10/20 found to have mssa bacteremia. CT spine no report of collection. Urine +ve E coli, transferred back to [facility] for MICHAEL, advanced spine imaging, and further management of MSSA bacteremia.

## 2024-10-26 NOTE — PROGRESS NOTE ADULT - PROBLEM SELECTOR PLAN 3
- S/P T9-L3 open fusion with PSO/lag screw partial corpectomy/interbody with complex plastic closure (w/ Dr. Stroud and Dr. Tinajero on 9/29)   - TLSO when OOB  - Previous pain management: Tylenol 975mg ATC, Duloxetine 20mg daily, Lyrica 100mg TID , Flexeril 5mg TID PRN,     Plan  - Start hydromorphone 2 mg PO q6  - Consulted neurosug, f/u reccs - S/P T9-L3 open fusion with PSO/lag screw partial corpectomy/interbody with complex plastic closure (w/ Dr. Stroud and Dr. Tinajero on 9/29)   - TLSO when OOB  - Previous pain management: Tylenol 975mg ATC, Duloxetine 20mg daily, Lyrica 100mg TID , Flexeril 5mg TID PRN,   - Start hydromorphone 2 mg PO q6  - Consulted neurosug, f/u reccs

## 2024-10-26 NOTE — PROGRESS NOTE ADULT - SUBJECTIVE AND OBJECTIVE BOX
Patient is a 74y old  Female who presents with a chief complaint of Bacteremia (26 Oct 2024 00:40)      SUBJECTIVE / OVERNIGHT EVENTS: NAOE. Yesterday IR drainage of paraspinal collection with drain placement, successful. Feeling better today, no acute complaints. Denies ROS. Up and walking around room.     MEDICATIONS  (STANDING):  acetaminophen     Tablet .. 975 milliGRAM(s) Oral every 8 hours  atorvastatin 40 milliGRAM(s) Oral at bedtime  bisacodyl Suppository 10 milliGRAM(s) Rectal once  ceFAZolin   IVPB 2000 milliGRAM(s) IV Intermittent every 8 hours  DULoxetine 20 milliGRAM(s) Oral daily  ergocalciferol 16579 Unit(s) Oral <User Schedule>  erythromycin    ethylsuccinate Suspension 40 mG/mL 128 milliGRAM(s) Oral every 12 hours  heparin  Infusion 1700 Unit(s)/Hr (16 mL/Hr) IV Continuous <Continuous>  metoprolol tartrate 25 milliGRAM(s) Oral two times a day  montelukast 10 milliGRAM(s) Oral daily  multivitamin 1 Tablet(s) Oral daily  polyethylene glycol 3350 17 Gram(s) Oral two times a day  pramipexole 0.75 milliGRAM(s) Oral <User Schedule>  pregabalin 100 milliGRAM(s) Oral every 8 hours  senna 2 Tablet(s) Oral at bedtime    MEDICATIONS  (PRN):  benzocaine/menthol Lozenge 1 Lozenge Oral every 3 hours PRN Mouth Sores  cyclobenzaprine 5 milliGRAM(s) Oral three times a day PRN Muscle Spasm  HYDROmorphone   Tablet 2 milliGRAM(s) Oral every 8 hours PRN Severe Pain (7 - 10)      CAPILLARY BLOOD GLUCOSE        I&O's Summary    25 Oct 2024 07:01  -  26 Oct 2024 07:00  --------------------------------------------------------  IN: 324 mL / OUT: 975 mL / NET: -651 mL    26 Oct 2024 07:01  -  26 Oct 2024 14:54  --------------------------------------------------------  IN: 360 mL / OUT: 0 mL / NET: 360 mL        Vital Signs Last 24 Hrs  T(C): 36.7 (26 Oct 2024 11:00), Max: 37.1 (25 Oct 2024 15:08)  T(F): 98.1 (26 Oct 2024 11:00), Max: 98.8 (25 Oct 2024 15:08)  HR: 92 (26 Oct 2024 11:00) (77 - 112)  BP: 129/72 (26 Oct 2024 11:00) (99/58 - 161/91)  BP(mean): 91 (25 Oct 2024 17:15) (74 - 91)  RR: 18 (26 Oct 2024 11:00) (16 - 18)  SpO2: 98% (26 Oct 2024 11:00) (93% - 100%)    Parameters below as of 26 Oct 2024 11:00  Patient On (Oxygen Delivery Method): room air      PHYSICAL EXAM:  GENERAL: NAD, well-developed, well-nourished  HEAD: Atraumatic, Normocephalic  EYES: EOMI, PERRLA, conjunctiva and sclera clear  NECK: Supple, No JVD  CHEST/LUNG: Clear to auscultation bilaterally; No wheezes or crackles  HEART: Normal S1/S2; Regular rate and rhythm; No murmurs, rubs, or gallops  ABDOMEN: Soft, Nontender, Nondistended; Bowel sounds present  EXTREMITIES: 2+ Peripheral Pulses; No clubbing, cyanosis, or edema  BACK: Paraspinal drain in place draining brown fluid with sediment. Brace in place  PSYCH: A&Ox3  NEUROLOGY: no focal neurologic deficit  SKIN: No rashes or lesions        LABS:                        8.3    7.85  )-----------( 375      ( 26 Oct 2024 05:52 )             28.2      10-26    140  |  106  |  15  ----------------------------<  144[H]  4.3   |  22  |  0.59    Ca    8.6      26 Oct 2024 05:53  Phos  2.4     10-26  Mg     2.3     10-26    TPro  6.4  /  Alb  3.2[L]  /  TBili  0.3  /  DBili  x   /  AST  26  /  ALT  25  /  AlkPhos  247[H]  10-26    PT/INR - ( 25 Oct 2024 04:01 )   PT: 12.2 sec;   INR: 1.07 ratio         PTT - ( 26 Oct 2024 05:52 )  PTT:80.4 sec      Urinalysis Basic - ( 26 Oct 2024 05:53 )    Color: x / Appearance: x / SG: x / pH: x  Gluc: 144 mg/dL / Ketone: x  / Bili: x / Urobili: x   Blood: x / Protein: x / Nitrite: x   Leuk Esterase: x / RBC: x / WBC x   Sq Epi: x / Non Sq Epi: x / Bacteria: x        RADIOLOGY & ADDITIONAL TESTS:    Imaging Personally Reviewed:    Consultant(s) Notes Reviewed:      Care Discussed with Consultants/Other Providers:

## 2024-10-27 LAB
-  CLINDAMYCIN: SIGNIFICANT CHANGE UP
-  ERYTHROMYCIN: SIGNIFICANT CHANGE UP
-  GENTAMICIN: SIGNIFICANT CHANGE UP
-  OXACILLIN: SIGNIFICANT CHANGE UP
-  PENICILLIN: SIGNIFICANT CHANGE UP
-  RIFAMPIN: SIGNIFICANT CHANGE UP
-  TETRACYCLINE: SIGNIFICANT CHANGE UP
-  TRIMETHOPRIM/SULFAMETHOXAZOLE: SIGNIFICANT CHANGE UP
-  VANCOMYCIN: SIGNIFICANT CHANGE UP
ALBUMIN SERPL ELPH-MCNC: 3.3 G/DL — SIGNIFICANT CHANGE UP (ref 3.3–5)
ALP SERPL-CCNC: 234 U/L — HIGH (ref 40–120)
ALT FLD-CCNC: 34 U/L — SIGNIFICANT CHANGE UP (ref 10–45)
ANION GAP SERPL CALC-SCNC: 14 MMOL/L — SIGNIFICANT CHANGE UP (ref 5–17)
APTT BLD: 66 SEC — HIGH (ref 24.5–35.6)
AST SERPL-CCNC: 45 U/L — HIGH (ref 10–40)
BASOPHILS # BLD AUTO: 0.04 K/UL — SIGNIFICANT CHANGE UP (ref 0–0.2)
BASOPHILS NFR BLD AUTO: 0.5 % — SIGNIFICANT CHANGE UP (ref 0–2)
BILIRUB SERPL-MCNC: 0.2 MG/DL — SIGNIFICANT CHANGE UP (ref 0.2–1.2)
BUN SERPL-MCNC: 20 MG/DL — SIGNIFICANT CHANGE UP (ref 7–23)
CALCIUM SERPL-MCNC: 8.7 MG/DL — SIGNIFICANT CHANGE UP (ref 8.4–10.5)
CHLORIDE SERPL-SCNC: 106 MMOL/L — SIGNIFICANT CHANGE UP (ref 96–108)
CO2 SERPL-SCNC: 18 MMOL/L — LOW (ref 22–31)
CREAT SERPL-MCNC: 0.64 MG/DL — SIGNIFICANT CHANGE UP (ref 0.5–1.3)
CULTURE RESULTS: SIGNIFICANT CHANGE UP
CULTURE RESULTS: SIGNIFICANT CHANGE UP
EGFR: 92 ML/MIN/1.73M2 — SIGNIFICANT CHANGE UP
EOSINOPHIL # BLD AUTO: 0.08 K/UL — SIGNIFICANT CHANGE UP (ref 0–0.5)
EOSINOPHIL NFR BLD AUTO: 0.9 % — SIGNIFICANT CHANGE UP (ref 0–6)
GLUCOSE SERPL-MCNC: 95 MG/DL — SIGNIFICANT CHANGE UP (ref 70–99)
HCT VFR BLD CALC: 26.4 % — LOW (ref 34.5–45)
HGB BLD-MCNC: 7.8 G/DL — LOW (ref 11.5–15.5)
IMM GRANULOCYTES NFR BLD AUTO: 2.4 % — HIGH (ref 0–0.9)
LYMPHOCYTES # BLD AUTO: 1.91 K/UL — SIGNIFICANT CHANGE UP (ref 1–3.3)
LYMPHOCYTES # BLD AUTO: 22.5 % — SIGNIFICANT CHANGE UP (ref 13–44)
MAGNESIUM SERPL-MCNC: 2.1 MG/DL — SIGNIFICANT CHANGE UP (ref 1.6–2.6)
MCHC RBC-ENTMCNC: 26.6 PG — LOW (ref 27–34)
MCHC RBC-ENTMCNC: 29.5 GM/DL — LOW (ref 32–36)
MCV RBC AUTO: 90.1 FL — SIGNIFICANT CHANGE UP (ref 80–100)
METHOD TYPE: SIGNIFICANT CHANGE UP
MONOCYTES # BLD AUTO: 0.49 K/UL — SIGNIFICANT CHANGE UP (ref 0–0.9)
MONOCYTES NFR BLD AUTO: 5.8 % — SIGNIFICANT CHANGE UP (ref 2–14)
NEUTROPHILS # BLD AUTO: 5.78 K/UL — SIGNIFICANT CHANGE UP (ref 1.8–7.4)
NEUTROPHILS NFR BLD AUTO: 67.9 % — SIGNIFICANT CHANGE UP (ref 43–77)
NRBC # BLD: 0 /100 WBCS — SIGNIFICANT CHANGE UP (ref 0–0)
PHOSPHATE SERPL-MCNC: 2.7 MG/DL — SIGNIFICANT CHANGE UP (ref 2.5–4.5)
PLATELET # BLD AUTO: 377 K/UL — SIGNIFICANT CHANGE UP (ref 150–400)
POTASSIUM SERPL-MCNC: 4.6 MMOL/L — SIGNIFICANT CHANGE UP (ref 3.5–5.3)
POTASSIUM SERPL-SCNC: 4.6 MMOL/L — SIGNIFICANT CHANGE UP (ref 3.5–5.3)
PROT SERPL-MCNC: 6.1 G/DL — SIGNIFICANT CHANGE UP (ref 6–8.3)
RBC # BLD: 2.93 M/UL — LOW (ref 3.8–5.2)
RBC # FLD: 15.9 % — HIGH (ref 10.3–14.5)
SODIUM SERPL-SCNC: 138 MMOL/L — SIGNIFICANT CHANGE UP (ref 135–145)
SPECIMEN SOURCE: SIGNIFICANT CHANGE UP
SPECIMEN SOURCE: SIGNIFICANT CHANGE UP
WBC # BLD: 8.5 K/UL — SIGNIFICANT CHANGE UP (ref 3.8–10.5)
WBC # FLD AUTO: 8.5 K/UL — SIGNIFICANT CHANGE UP (ref 3.8–10.5)

## 2024-10-27 PROCEDURE — 99233 SBSQ HOSP IP/OBS HIGH 50: CPT | Mod: GC

## 2024-10-27 RX ORDER — HYDROXYZINE HCL 25 MG
25 TABLET ORAL ONCE
Refills: 0 | Status: COMPLETED | OUTPATIENT
Start: 2024-10-27 | End: 2024-10-27

## 2024-10-27 RX ORDER — HYDROMORPHONE HCL/0.9% NACL/PF 6 MG/30 ML
0.2 PATIENT CONTROLLED ANALGESIA SYRINGE INTRAVENOUS ONCE
Refills: 0 | Status: DISCONTINUED | OUTPATIENT
Start: 2024-10-27 | End: 2024-10-27

## 2024-10-27 RX ORDER — LOSARTAN POTASSIUM 25 MG/1
100 TABLET ORAL DAILY
Refills: 0 | Status: DISCONTINUED | OUTPATIENT
Start: 2024-10-27 | End: 2024-10-30

## 2024-10-27 RX ADMIN — Medication 975 MILLIGRAM(S): at 14:02

## 2024-10-27 RX ADMIN — PREGABALIN 100 MILLIGRAM(S): 150 CAPSULE ORAL at 05:26

## 2024-10-27 RX ADMIN — Medication 100 MILLIGRAM(S): at 22:30

## 2024-10-27 RX ADMIN — Medication 2 MILLIGRAM(S): at 17:54

## 2024-10-27 RX ADMIN — Medication 128 MILLIGRAM(S): at 17:08

## 2024-10-27 RX ADMIN — Medication 0.2 MILLIGRAM(S): at 21:05

## 2024-10-27 RX ADMIN — Medication 0.2 MILLIGRAM(S): at 20:05

## 2024-10-27 RX ADMIN — DULOXETINE HYDROCHLORIDE 20 MILLIGRAM(S): 30 CAPSULE, DELAYED RELEASE ORAL at 13:24

## 2024-10-27 RX ADMIN — PRAMIPEXOLE DIHYDROCHLORIDE 0.75 MILLIGRAM(S): 0.12 TABLET ORAL at 17:16

## 2024-10-27 RX ADMIN — Medication 100 MILLIGRAM(S): at 05:26

## 2024-10-27 RX ADMIN — Medication 2 MILLIGRAM(S): at 09:15

## 2024-10-27 RX ADMIN — Medication 25 MILLIGRAM(S): at 05:27

## 2024-10-27 RX ADMIN — Medication 1 TABLET(S): at 13:22

## 2024-10-27 RX ADMIN — Medication 2 MILLIGRAM(S): at 17:07

## 2024-10-27 RX ADMIN — Medication 975 MILLIGRAM(S): at 13:22

## 2024-10-27 RX ADMIN — MONTELUKAST SODIUM 10 MILLIGRAM(S): 10 TABLET, FILM COATED ORAL at 13:23

## 2024-10-27 RX ADMIN — Medication 1 LOZENGE: at 13:23

## 2024-10-27 RX ADMIN — Medication 975 MILLIGRAM(S): at 22:31

## 2024-10-27 RX ADMIN — Medication 975 MILLIGRAM(S): at 23:30

## 2024-10-27 RX ADMIN — PREGABALIN 100 MILLIGRAM(S): 150 CAPSULE ORAL at 13:15

## 2024-10-27 RX ADMIN — Medication 975 MILLIGRAM(S): at 05:26

## 2024-10-27 RX ADMIN — Medication 0.2 MILLIGRAM(S): at 00:15

## 2024-10-27 RX ADMIN — Medication 100 MILLIGRAM(S): at 13:16

## 2024-10-27 RX ADMIN — LOSARTAN POTASSIUM 100 MILLIGRAM(S): 25 TABLET ORAL at 13:16

## 2024-10-27 RX ADMIN — Medication 25 MILLIGRAM(S): at 23:50

## 2024-10-27 RX ADMIN — Medication 25 MILLIGRAM(S): at 00:00

## 2024-10-27 RX ADMIN — Medication 40 MILLIGRAM(S): at 22:31

## 2024-10-27 RX ADMIN — Medication 25 MILLIGRAM(S): at 17:09

## 2024-10-27 RX ADMIN — Medication 0.2 MILLIGRAM(S): at 01:00

## 2024-10-27 RX ADMIN — Medication 128 MILLIGRAM(S): at 05:27

## 2024-10-27 RX ADMIN — Medication 2 TABLET(S): at 22:31

## 2024-10-27 RX ADMIN — PREGABALIN 100 MILLIGRAM(S): 150 CAPSULE ORAL at 22:31

## 2024-10-27 RX ADMIN — POLYETHYLENE GLYCOL 3350 17 GRAM(S): 17 POWDER, FOR SOLUTION ORAL at 22:30

## 2024-10-27 RX ADMIN — CYCLOBENZAPRINE HYDROCHLORIDE 5 MILLIGRAM(S): 30 CAPSULE, EXTENDED RELEASE ORAL at 13:16

## 2024-10-27 NOTE — PROGRESS NOTE ADULT - SUBJECTIVE AND OBJECTIVE BOX
DATE OF SERVICE: 10-27-24 @ 07:30  --------------------------------------------------------------  Cody Sparks MD  PGY-3, Internal Medicine  [employer] Teams preferred  Pager #: [phone], [facility] [phone]  After 7 PM: Night Float Pager  --------------------------------------------------------------      Patient is a 74y old  Female who presents with a chief complaint of Bacteremia (27 Oct 2024 05:42)      SUBJECTIVE / OVERNIGHT EVENTS:  No acute events overnight, patient reports feeling well and all questions answered at this time.     Additional Review of Systems:  Denies any other acute sx including f/c, HA, cough, sore throat, eye/ear changes, rhinorrhea, CP, palpitations, SOB, n/v, abdominal pain, back pain, bowel/bladder changes, fatigue, numbness or tingling.    MEDICATIONS  (STANDING):  acetaminophen     Tablet .. 975 milliGRAM(s) Oral every 8 hours  atorvastatin 40 milliGRAM(s) Oral at bedtime  ceFAZolin   IVPB 2000 milliGRAM(s) IV Intermittent every 8 hours  DULoxetine 20 milliGRAM(s) Oral daily  ergocalciferol 73095 Unit(s) Oral <User Schedule>  erythromycin    ethylsuccinate Suspension 40 mG/mL 128 milliGRAM(s) Oral every 12 hours  heparin  Infusion 1700 Unit(s)/Hr (16 mL/Hr) IV Continuous <Continuous>  metoprolol tartrate 25 milliGRAM(s) Oral two times a day  montelukast 10 milliGRAM(s) Oral daily  multivitamin 1 Tablet(s) Oral daily  polyethylene glycol 3350 17 Gram(s) Oral two times a day  pramipexole 0.75 milliGRAM(s) Oral <User Schedule>  pregabalin 100 milliGRAM(s) Oral every 8 hours  senna 2 Tablet(s) Oral at bedtime    MEDICATIONS  (PRN):  benzocaine/menthol Lozenge 1 Lozenge Oral every 3 hours PRN Mouth Sores  cyclobenzaprine 5 milliGRAM(s) Oral three times a day PRN Muscle Spasm  HYDROmorphone   Tablet 2 milliGRAM(s) Oral every 8 hours PRN Severe Pain (7 - 10)      Vital Signs Last 24 Hrs  T(C): 36.9 (27 Oct 2024 04:36), Max: 36.9 (26 Oct 2024 20:53)  T(F): 98.4 (27 Oct 2024 04:36), Max: 98.5 (26 Oct 2024 20:53)  HR: 78 (27 Oct 2024 04:36) (78 - 92)  BP: 115/64 (27 Oct 2024 04:36) (115/64 - 138/61)  BP(mean): --  RR: 18 (27 Oct 2024 04:36) (18 - 20)  SpO2: 95% (27 Oct 2024 04:36) (95% - 98%)    Parameters below as of 27 Oct 2024 04:36  Patient On (Oxygen Delivery Method): room air      CAPILLARY BLOOD GLUCOSE        I&O's Summary    26 Oct 2024 07:01  -  27 Oct 2024 07:00  --------------------------------------------------------  IN: 720 mL / OUT: 200 mL / NET: 520 mL    PHYSICAL EXAM:  GENERAL: NAD, well-developed, well-nourished  HEAD: Atraumatic, Normocephalic  EYES: EOMI, PERRLA, conjunctiva and sclera clear  NECK: Supple, No JVD  CHEST/LUNG: Clear to auscultation bilaterally; No wheezes or crackles  HEART: Normal S1/S2; Regular rate and rhythm; No murmurs, rubs, or gallops  ABDOMEN: Soft, Nontender, Nondistended; Bowel sounds present  EXTREMITIES: 2+ Peripheral Pulses; No clubbing, cyanosis, or edema  BACK: Paraspinal drain in place draining brown fluid with sediment. Brace in place  PSYCH: A&Ox3  NEUROLOGY: no focal neurologic deficit  SKIN: No rashes or lesions        LABS:                        8.3    7.85  )-----------( 375      ( 26 Oct 2024 05:52 )             28.2     10-26    140  |  106  |  15  ----------------------------<  144[H]  4.3   |  22  |  0.59    Ca    8.6      26 Oct 2024 05:53  Phos  2.4     10-26  Mg     2.3     10-26    TPro  6.4  /  Alb  3.2[L]  /  TBili  0.3  /  DBili  x   /  AST  26  /  ALT  25  /  AlkPhos  247[H]  10-26    PTT - ( 27 Oct 2024 00:47 )  PTT:66.0 sec      Urinalysis Basic - ( 26 Oct 2024 05:53 )    Color: x / Appearance: x / SG: x / pH: x  Gluc: 144 mg/dL / Ketone: x  / Bili: x / Urobili: x   Blood: x / Protein: x / Nitrite: x   Leuk Esterase: x / RBC: x / WBC x   Sq Epi: x / Non Sq Epi: x / Bacteria: x        RADIOLOGY & ADDITIONAL TESTS:    Imaging Personally Reviewed:    Consultant(s) Notes Reviewed:      Care Discussed with Consultants/Other Providers:

## 2024-10-27 NOTE — PROGRESS NOTE ADULT - ASSESSMENT
74-year-old female patient with past medical history of OA, HTN, gastroparesis, peripheral neuropathy, multiple prior thoracolumbar spine surgeries for congenital scoliosis [place]/ [place]1991) and recent fall with T11-T12 fracture, s/p thoracolumbar posterior fusion for t spine fx 9/29, c/b post op csf leak, sent to rehab on 10/17, c/b sepsis with high fever, rigors, tachycardia on 10/20 found to have mssa bacteremia. CT spine no report of collection. Urine +ve E coli, transferred back to [facility] for MICHAEL, advanced spine imaging, and further management of MSSA bacteremia.

## 2024-10-27 NOTE — PROGRESS NOTE ADULT - PROBLEM SELECTOR PLAN 1
Patient presents with sepsis with high fever, rigors, tachycardia on 10/20 found to have MSSA bacteremia.  - 10/20 Bcx MSSA, 10/21 no growth at 24hrs  - CXR with old rib fracture and small RUL infiltrate  - 10/20 +UA for Ecoli, follow urine culture  - lactic acidosis resolved, lactate 1.6 10/23  - 10/23 Na 134 K 3.3 Phos 1.5  - MRI: rim-enhancing heterogeneous fluid collection suspicious for an abscess  - TTE: no evidence of vegetations    Plan  - Neurosurg consulted and following, no neurosurgical contraindication to drain fluid w IR today in addition to sending aspirate for cultures, f/u reccs  - ID consulted, requests culture and gram stain fluid  - IR consulted, approved for paraspinal collection aspiration 10/25   - MICHAEL scheduled for 10/25, NPO at midnight  - Repeat daily Bcx  - Continue to monitor electrolytes  - Monitor WBC and fever curve  - s/p abscess drainage, culture growing GPC in pairs   - c/w cefazolin 2000mg IV q8

## 2024-10-27 NOTE — PROGRESS NOTE ADULT - ATTENDING COMMENTS
75 yo F with PMH of HTN, gastroparesis, peripheral neuropathy, multiple prior thoracolumbar spine surgeries for congenital scoliosis presents with back pain s/p mechanical fall from sitting. Found with T12 three column spine fracture-malalignment with fracture extending to the adjacent right 12th posterior rib and left T12-L1 facet joint s/p T9-L3 fusion on 9/29 post-op course c/b recurrent brief Afib discharged to acute rehab where she had RRT 2/2 to sepsis, BCX + MSSA tx to [facility] for further eval      #Sepsis 2/2 to MSSA bacteremia  # Paraspinal collection  #Paroxysmal atrial fibrillation  #HTN  # Depression  # Acute on Chronic back pain      - MRI T/L spine 8.0 x 7.0 x 3.0 (CC x TV x AP) rim-enhancing heterogeneous fluid collection in the dorsal paraspinal through superficial subcutaneous tissues at the T7-L4 levels; 8.0 x 7.0 x 3.0 (CC x TV x AP) rim-enhancing heterogeneous fluid collection in the dorsal paraspinal through   superficial subcutaneous tissues at the T7-L4 levels  -s/p IR guided aspiration of paraspinal fluid collection 10/25 and drainage, cultures with gram pos cocci, b2 transferrin pending, if AMS then clamp call NSG, IR    - needs PICC line BCX NGTD 10/21, 10/23- will obtain PICC 10/28 and then dc planning to acute rehab  - c/w Ancef 2gm q8hr, repeat BCX 10/23 NGTD  - likely source of sepsis is MSSA bacteremia is paraspinal collection 4/4 +MSSA 10/20, less likely UTI- UCX with E coli and Klebsiella but patient asymptomatic  - TTE 10/23- no vegetations, MICHAEL no vegetations, PFO+  - NSG c/s appreciated  - also found to be back in a fib- ekg confirmed- tele monitoring- CHADSVASC is 5- hep gtt- and once done with procedures plan to transition to DOAC  - c/w Hydromorphone 2mg PO q8hr for severe pain, senna, Miralax for bowel regimen; requiring less pain medication post-drainage  - resume home losartan post procedure, increase metoprolol to 25mg BID for more adequate rate control  - c/w tele for now .

## 2024-10-27 NOTE — PROGRESS NOTE ADULT - PROBLEM SELECTOR PLAN 2
- 10/7 cardiology consult for Afib, patient converted back to SR. if AF is persistent will need AC  - 10/25 ECG and exam notable for irregular rate and rhythm  - Increase Metoprolol to 20 BID for rate control  - Continue to monitor on telemetry  - c/w heparin gtt 3500mg IV q6 for anticoagulation  - Consulted cardiology, rec to replete electrolytes and treat sepsis before addressing Afib, f/u reccs  - Consulted outpt cardiology Dr. Copeland, f/u reccs. Multiple messages left for Dr. Copeland

## 2024-10-27 NOTE — PROGRESS NOTE ADULT - PROBLEM SELECTOR PLAN 3
- S/P T9-L3 open fusion with PSO/lag screw partial corpectomy/interbody with complex plastic closure (w/ Dr. Stroud and Dr. Tinajero on 9/29)   - TLSO when OOB  - Previous pain management: Tylenol 975mg ATC, Duloxetine 20mg daily, Lyrica 100mg TID , Flexeril 5mg TID PRN,   - Start hydromorphone 2 mg PO q6  - Consulted neurosug, f/u reccs

## 2024-10-27 NOTE — PROGRESS NOTE ADULT - SUBJECTIVE AND OBJECTIVE BOX
Patient seen and examined at bedside.    --Anticoagulation--  heparin  Infusion 1700 Unit(s)/Hr IV Continuous <Continuous>    T(C): 36.9 (10-27-24 @ 04:36), Max: 36.9 (10-26-24 @ 20:53)  HR: 78 (10-27-24 @ 04:36) (78 - 92)  BP: 115/64 (10-27-24 @ 04:36) (115/64 - 138/61)  RR: 18 (10-27-24 @ 04:36) (18 - 20)  SpO2: 95% (10-27-24 @ 04:36) (95% - 98%)  Wt(kg): --    Exam:  AOx3, BUE 5/5, BLE 5/5, incision c/d/i, mild bogginess posterior aspect of collection

## 2024-10-27 NOTE — PROGRESS NOTE ADULT - ASSESSMENT
- s/p tap w/ rare staph aureus  - blood cx 10/21 neg  - c/w trial of abx, drainage  - BCx 10/20 MSSA

## 2024-10-28 ENCOUNTER — TRANSCRIPTION ENCOUNTER (OUTPATIENT)
Age: 74
End: 2024-10-28

## 2024-10-28 LAB
ALBUMIN SERPL ELPH-MCNC: 3.4 G/DL — SIGNIFICANT CHANGE UP (ref 3.3–5)
ALP SERPL-CCNC: 276 U/L — HIGH (ref 40–120)
ALT FLD-CCNC: 29 U/L — SIGNIFICANT CHANGE UP (ref 10–45)
ANION GAP SERPL CALC-SCNC: 12 MMOL/L — SIGNIFICANT CHANGE UP (ref 5–17)
APTT BLD: 45.7 SEC — HIGH (ref 24.5–35.6)
AST SERPL-CCNC: 31 U/L — SIGNIFICANT CHANGE UP (ref 10–40)
B2 TRANSFERRIN FLD QL: NEGATIVE — SIGNIFICANT CHANGE UP
BASOPHILS # BLD AUTO: 0.06 K/UL — SIGNIFICANT CHANGE UP (ref 0–0.2)
BASOPHILS NFR BLD AUTO: 0.7 % — SIGNIFICANT CHANGE UP (ref 0–2)
BILIRUB SERPL-MCNC: 0.2 MG/DL — SIGNIFICANT CHANGE UP (ref 0.2–1.2)
BUN SERPL-MCNC: 21 MG/DL — SIGNIFICANT CHANGE UP (ref 7–23)
CALCIUM SERPL-MCNC: 9.2 MG/DL — SIGNIFICANT CHANGE UP (ref 8.4–10.5)
CHLORIDE SERPL-SCNC: 104 MMOL/L — SIGNIFICANT CHANGE UP (ref 96–108)
CO2 SERPL-SCNC: 23 MMOL/L — SIGNIFICANT CHANGE UP (ref 22–31)
CREAT SERPL-MCNC: 0.79 MG/DL — SIGNIFICANT CHANGE UP (ref 0.5–1.3)
CULTURE RESULTS: SIGNIFICANT CHANGE UP
CULTURE RESULTS: SIGNIFICANT CHANGE UP
EGFR: 77 ML/MIN/1.73M2 — SIGNIFICANT CHANGE UP
EOSINOPHIL # BLD AUTO: 0.2 K/UL — SIGNIFICANT CHANGE UP (ref 0–0.5)
EOSINOPHIL NFR BLD AUTO: 2.3 % — SIGNIFICANT CHANGE UP (ref 0–6)
GLUCOSE SERPL-MCNC: 106 MG/DL — HIGH (ref 70–99)
HCT VFR BLD CALC: 28.3 % — LOW (ref 34.5–45)
HGB BLD-MCNC: 8.6 G/DL — LOW (ref 11.5–15.5)
IMM GRANULOCYTES NFR BLD AUTO: 5 % — HIGH (ref 0–0.9)
LYMPHOCYTES # BLD AUTO: 1.91 K/UL — SIGNIFICANT CHANGE UP (ref 1–3.3)
LYMPHOCYTES # BLD AUTO: 22.3 % — SIGNIFICANT CHANGE UP (ref 13–44)
MAGNESIUM SERPL-MCNC: 1.9 MG/DL — SIGNIFICANT CHANGE UP (ref 1.6–2.6)
MCHC RBC-ENTMCNC: 27.2 PG — SIGNIFICANT CHANGE UP (ref 27–34)
MCHC RBC-ENTMCNC: 30.4 GM/DL — LOW (ref 32–36)
MCV RBC AUTO: 89.6 FL — SIGNIFICANT CHANGE UP (ref 80–100)
MONOCYTES # BLD AUTO: 0.53 K/UL — SIGNIFICANT CHANGE UP (ref 0–0.9)
MONOCYTES NFR BLD AUTO: 6.2 % — SIGNIFICANT CHANGE UP (ref 2–14)
NEUTROPHILS # BLD AUTO: 5.45 K/UL — SIGNIFICANT CHANGE UP (ref 1.8–7.4)
NEUTROPHILS NFR BLD AUTO: 63.5 % — SIGNIFICANT CHANGE UP (ref 43–77)
NRBC # BLD: 0 /100 WBCS — SIGNIFICANT CHANGE UP (ref 0–0)
PHOSPHATE SERPL-MCNC: 3 MG/DL — SIGNIFICANT CHANGE UP (ref 2.5–4.5)
PLATELET # BLD AUTO: 487 K/UL — HIGH (ref 150–400)
POTASSIUM SERPL-MCNC: 4.3 MMOL/L — SIGNIFICANT CHANGE UP (ref 3.5–5.3)
POTASSIUM SERPL-SCNC: 4.3 MMOL/L — SIGNIFICANT CHANGE UP (ref 3.5–5.3)
PROT SERPL-MCNC: 6.4 G/DL — SIGNIFICANT CHANGE UP (ref 6–8.3)
RBC # BLD: 3.16 M/UL — LOW (ref 3.8–5.2)
RBC # FLD: 15.9 % — HIGH (ref 10.3–14.5)
SODIUM SERPL-SCNC: 139 MMOL/L — SIGNIFICANT CHANGE UP (ref 135–145)
SPECIMEN SOURCE: SIGNIFICANT CHANGE UP
SPECIMEN SOURCE: SIGNIFICANT CHANGE UP
WBC # BLD: 8.58 K/UL — SIGNIFICANT CHANGE UP (ref 3.8–10.5)
WBC # FLD AUTO: 8.58 K/UL — SIGNIFICANT CHANGE UP (ref 3.8–10.5)

## 2024-10-28 PROCEDURE — 71045 X-RAY EXAM CHEST 1 VIEW: CPT | Mod: 26

## 2024-10-28 PROCEDURE — 99222 1ST HOSP IP/OBS MODERATE 55: CPT

## 2024-10-28 PROCEDURE — 99232 SBSQ HOSP IP/OBS MODERATE 35: CPT

## 2024-10-28 PROCEDURE — 99233 SBSQ HOSP IP/OBS HIGH 50: CPT | Mod: GC

## 2024-10-28 RX ORDER — APIXABAN 5 MG/1
5 TABLET, FILM COATED ORAL EVERY 12 HOURS
Refills: 0 | Status: DISCONTINUED | OUTPATIENT
Start: 2024-10-28 | End: 2024-10-30

## 2024-10-28 RX ORDER — HYDROMORPHONE HCL/0.9% NACL/PF 6 MG/30 ML
0.2 PATIENT CONTROLLED ANALGESIA SYRINGE INTRAVENOUS ONCE
Refills: 0 | Status: DISCONTINUED | OUTPATIENT
Start: 2024-10-28 | End: 2024-10-28

## 2024-10-28 RX ORDER — CEFAZOLIN SODIUM 1 G
2 VIAL (EA) INJECTION
Qty: 270 | Refills: 0
Start: 2024-10-28 | End: 2024-12-11

## 2024-10-28 RX ADMIN — Medication 1 TABLET(S): at 12:49

## 2024-10-28 RX ADMIN — Medication 2 TABLET(S): at 21:11

## 2024-10-28 RX ADMIN — Medication 975 MILLIGRAM(S): at 06:33

## 2024-10-28 RX ADMIN — POLYETHYLENE GLYCOL 3350 17 GRAM(S): 17 POWDER, FOR SOLUTION ORAL at 18:05

## 2024-10-28 RX ADMIN — Medication 25 MILLIGRAM(S): at 06:33

## 2024-10-28 RX ADMIN — MONTELUKAST SODIUM 10 MILLIGRAM(S): 10 TABLET, FILM COATED ORAL at 12:49

## 2024-10-28 RX ADMIN — HEPARIN SODIUM 16.5 UNIT(S)/HR: 10000 INJECTION INTRAVENOUS; SUBCUTANEOUS at 09:21

## 2024-10-28 RX ADMIN — PRAMIPEXOLE DIHYDROCHLORIDE 0.75 MILLIGRAM(S): 0.12 TABLET ORAL at 21:11

## 2024-10-28 RX ADMIN — Medication 100 MILLIGRAM(S): at 21:14

## 2024-10-28 RX ADMIN — Medication 2 MILLIGRAM(S): at 13:19

## 2024-10-28 RX ADMIN — Medication 975 MILLIGRAM(S): at 13:48

## 2024-10-28 RX ADMIN — APIXABAN 5 MILLIGRAM(S): 5 TABLET, FILM COATED ORAL at 18:11

## 2024-10-28 RX ADMIN — CYCLOBENZAPRINE HYDROCHLORIDE 5 MILLIGRAM(S): 30 CAPSULE, EXTENDED RELEASE ORAL at 10:39

## 2024-10-28 RX ADMIN — Medication 975 MILLIGRAM(S): at 22:11

## 2024-10-28 RX ADMIN — CYCLOBENZAPRINE HYDROCHLORIDE 5 MILLIGRAM(S): 30 CAPSULE, EXTENDED RELEASE ORAL at 18:06

## 2024-10-28 RX ADMIN — PREGABALIN 100 MILLIGRAM(S): 150 CAPSULE ORAL at 06:34

## 2024-10-28 RX ADMIN — CYCLOBENZAPRINE HYDROCHLORIDE 5 MILLIGRAM(S): 30 CAPSULE, EXTENDED RELEASE ORAL at 03:21

## 2024-10-28 RX ADMIN — Medication 100 MILLIGRAM(S): at 15:51

## 2024-10-28 RX ADMIN — Medication 25 MILLIGRAM(S): at 18:06

## 2024-10-28 RX ADMIN — LOSARTAN POTASSIUM 100 MILLIGRAM(S): 25 TABLET ORAL at 12:49

## 2024-10-28 RX ADMIN — Medication 0.2 MILLIGRAM(S): at 04:40

## 2024-10-28 RX ADMIN — Medication 2 MILLIGRAM(S): at 22:11

## 2024-10-28 RX ADMIN — PREGABALIN 100 MILLIGRAM(S): 150 CAPSULE ORAL at 21:12

## 2024-10-28 RX ADMIN — Medication 2 MILLIGRAM(S): at 21:12

## 2024-10-28 RX ADMIN — Medication 975 MILLIGRAM(S): at 07:30

## 2024-10-28 RX ADMIN — PREGABALIN 100 MILLIGRAM(S): 150 CAPSULE ORAL at 13:47

## 2024-10-28 RX ADMIN — Medication 2 MILLIGRAM(S): at 12:49

## 2024-10-28 RX ADMIN — Medication 128 MILLIGRAM(S): at 06:34

## 2024-10-28 RX ADMIN — Medication 975 MILLIGRAM(S): at 14:18

## 2024-10-28 RX ADMIN — Medication 100 MILLIGRAM(S): at 06:34

## 2024-10-28 RX ADMIN — Medication 40 MILLIGRAM(S): at 21:12

## 2024-10-28 RX ADMIN — Medication 975 MILLIGRAM(S): at 21:12

## 2024-10-28 RX ADMIN — Medication 0.2 MILLIGRAM(S): at 03:40

## 2024-10-28 RX ADMIN — DULOXETINE HYDROCHLORIDE 20 MILLIGRAM(S): 30 CAPSULE, DELAYED RELEASE ORAL at 12:49

## 2024-10-28 NOTE — DISCHARGE NOTE PROVIDER - NSDCFUSCHEDAPPT_GEN_ALL_CORE_FT
Guille Lance  [facility] Physician [facility]  ORTHOSURG [address] [facility]  Scheduled Appointment: 11/22/2024    Catalino Cotto  [facility] Physician [facility]  WEIGHTMGMT  [facility]  Scheduled Appointment: 12/05/2024    Haydee Bernstein  [facility] Physician [facility]  GASTRO [address]  Scheduled Appointment: 01/06/2025

## 2024-10-28 NOTE — DISCHARGE NOTE PROVIDER - CARE PROVIDER_API CALL
Po Guerra  Interventional Radiology and Diagnostic Radiology  [address] [place] [id number]  Phone: [phone]  Fax: [phone]  Follow Up Time:    Fran Carr  Internal Medicine  [address] [facility] [address]  [place] [address]  Phone: [phone]  Fax: [phone]  Follow Up Time:     Kaushal Waterman  Infectious Disease  [address] [place] [id number]  Phone: [phone]  Fax: [phone]  Follow Up Time:

## 2024-10-28 NOTE — PROGRESS NOTE ADULT - ASSESSMENT
- s/p tap w/ rare staph aureus  - BCx 10/20 MSSA   - blood cx 10/21 neg  - c/w conservative mgmt, trial of abx, drainage

## 2024-10-28 NOTE — PROGRESS NOTE ADULT - SUBJECTIVE AND OBJECTIVE BOX
Patient seen and examined at bedside.    --Anticoagulation--  heparin  Infusion 1700 Unit(s)/Hr IV Continuous <Continuous>    T(C): 36.9 (10-28-24 @ 04:59), Max: 36.9 (10-27-24 @ 11:11)  HR: 93 (10-28-24 @ 04:59) (78 - 93)  BP: 138/69 (10-28-24 @ 04:59) (137/81 - 180/83)  RR: 18 (10-28-24 @ 04:59) (18 - 19)  SpO2: 94% (10-28-24 @ 04:59) (94% - 97%)  Wt(kg): --    Exam:  AOx3, BUE 5/5, BLE 5/5, incision c/d/i, mild bogginess posterior aspect of collection

## 2024-10-28 NOTE — PROGRESS NOTE ADULT - SUBJECTIVE AND OBJECTIVE BOX
[facility]  Division of Infectious Diseases  [phone]    Name: VIRGINIA HUFFMAN  Age: 74y  Gender: Female  MRN: [id number]    Interval History--  Notes reviewed.     Past Medical History--  H/O seasonal allergies    History of pulmonary embolism    Restless leg syndrome    GERD (gastroesophageal reflux disease)    HTN (hypertension)    OA (osteoarthritis)    Fungal infection of skin    H/O scoliosis    Essential hypertension    Depression    Osteoporosis    Right hip pain    2019 novel coronavirus disease (COVID-19)    Hiatal hernia    History of chronic pain    Peripheral neuropathy    S/P repair of paraesophageal hernia    S/P spinal fusion    Scoliosis    S/P spinal surgery    Removal of pin, plate, abigail, or screw    S/P hysterectomy    S/P hip replacement    S/P shoulder surgery    S/P dilatation and curettage    H/O arthroscopy of knee    H/O total knee replacement, right    S/P cataract surgery    History of bilateral hip replacements        For details regarding the patient's social history, family history, and other miscellaneous elements, please refer the initial infectious diseases consultation and/or the admitting history and physical examination for this admission.    Allergies    Dilantin (Urticaria)  penicillins (Urticaria)    Intolerances    erythromycin (Stomach Upset; Vomiting; Nausea)      Medications--  Antibiotics:  ceFAZolin   IVPB 2000 milliGRAM(s) IV Intermittent every 8 hours  erythromycin    ethylsuccinate Suspension 40 mG/mL 128 milliGRAM(s) Oral every 12 hours    Immunologic:    Other:  acetaminophen     Tablet ..  atorvastatin  benzocaine/menthol Lozenge PRN  cyclobenzaprine PRN  DULoxetine  ergocalciferol  heparin  Infusion  HYDROmorphone   Tablet PRN  losartan  metoprolol tartrate  montelukast  multivitamin  polyethylene glycol 3350  pramipexole  pregabalin  senna  Vonoprazan (Voquenza) Tablet 10 milliGRAM(s)      Review of Systems--  A 10-point review of systems was obtained.     Pertinent positives and negatives--  Constitutional: No fevers. No Chills. No Rigors.   Cardiovascular: No chest pain. No palpitations.  Respiratory: No shortness of breath. No cough.  Gastrointestinal: No nausea or vomiting. No diarrhea or constipation.   Psychiatric: Pleasant. Appropriate affect.    Review of systems otherwise negative except as previously noted.    Physical Examination--  Vital Signs: T(F): 98.4 (10-28-24 @ 04:59), Max: 98.4 (10-27-24 @ 11:11)  HR: 93 (10-28-24 @ 04:59)  BP: 138/69 (10-28-24 @ 04:59)  RR: 18 (10-28-24 @ 04:59)  SpO2: 94% (10-28-24 @ 04:59)  Wt(kg): --  General: Nontoxic-appearing Female in no acute distress.  HEENT: AT/NC. PERRL. EOMI. Anicteric. Conjunctiva pink and moist. Oropharynx clear. Dentition fair.  Neck: Not rigid. No sense of mass.  Nodes: None palpable.  Lungs: Clear bilaterally without rales, wheezing or rhonchi  Heart: Regular rate and rhythm. No Murmur. No rub. No gallop. No palpable thrill.  Abdomen: Bowel sounds present and normoactive. Soft. Nondistended. Nontender. No sense of mass. No organomegaly.  Back: No spinal tenderness. No costovertebral angle tenderness.   Extremities: No cyanosis or clubbing. No edema.   Skin: Warm. Dry. Good turgor. No rash. No vasculitic stigmata.  Psychiatric: Appropriate affect and mood for situation.         Laboratory Studies--  CBC                        8.6    8.58  )-----------( 487      ( 28 Oct 2024 07:10 )             28.3       Chemistries  10-28    139  |  104  |  21  ----------------------------<  106[H]  4.3   |  23  |  0.79    Ca    9.2      28 Oct 2024 07:08  Phos  3.0     10-28  Mg     1.9     10-28    TPro  6.4  /  Alb  3.4  /  TBili  0.2  /  DBili  x   /  AST  31  /  ALT  29  /  AlkPhos  276[H]  10-28      Culture Data    Culture - Body Fluid with Gram Stain (collected 25 Oct 2024 13:46)  Source: Body Fluid  Gram Stain (25 Oct 2024 21:03):    polymorphonuclear leukocytes seen    Gram positive cocci in pairs seen    by cytocentrifuge  Preliminary Report (26 Oct 2024 21:01):    Rare Staphylococcus aureus  Organism: Staphylococcus aureus (27 Oct 2024 15:13)  Organism: Staphylococcus aureus (27 Oct 2024 15:13)    Culture - Blood (collected 23 Oct 2024 09:55)  Source: .Blood BLOOD  Preliminary Report (27 Oct 2024 16:01):    No growth at 4 days    Culture - Blood (collected 23 Oct 2024 09:40)  Source: .Blood BLOOD  Preliminary Report (27 Oct 2024 16:01):    No growth at 4 days    Culture - Blood (collected 21 Oct 2024 18:02)  Source: .Blood BLOOD  Final Report (27 Oct 2024 01:00):    No growth at 5 days    Culture - Blood (collected 21 Oct 2024 17:58)  Source: .Blood BLOOD  Final Report (27 Oct 2024 01:00):    No growth at 5 days             [facility]  Division of Infectious Diseases  [phone]    Name: VRIGINIA HUFFMAN  Age: 74y  Gender: Female  MRN: [id number]    Interval History--  Notes reviewed. Seen earlier today. Doing well, though pain management suboptimal.   No fevers, chills, or rigors.   F/U blood cx NTD  MICHAEL with PFO but no vegetation  Fluid aspirate MSSA as expected  Started on erythromycin as a promotility agent, not an antibacterial      Past Medical History--  H/O seasonal allergies    History of pulmonary embolism    Restless leg syndrome    GERD (gastroesophageal reflux disease)    HTN (hypertension)    OA (osteoarthritis)    Fungal infection of skin    H/O scoliosis    Essential hypertension    Depression    Osteoporosis    Right hip pain    2019 novel coronavirus disease (COVID-19)    Hiatal hernia    History of chronic pain    Peripheral neuropathy    S/P repair of paraesophageal hernia    S/P spinal fusion    Scoliosis    S/P spinal surgery    Removal of pin, plate, abigail, or screw    S/P hysterectomy    S/P hip replacement    S/P shoulder surgery    S/P dilatation and curettage    H/O arthroscopy of knee    H/O total knee replacement, right    S/P cataract surgery    History of bilateral hip replacements        For details regarding the patient's social history, family history, and other miscellaneous elements, please refer the initial infectious diseases consultation and/or the admitting history and physical examination for this admission.    Allergies    Dilantin (Urticaria)  penicillins (Urticaria)    Intolerances    erythromycin (Stomach Upset; Vomiting; Nausea)      Medications--  Antibiotics:  ceFAZolin   IVPB 2000 milliGRAM(s) IV Intermittent every 8 hours  erythromycin    ethylsuccinate Suspension 40 mG/mL 128 milliGRAM(s) Oral every 12 hours    Immunologic:    Other:  acetaminophen     Tablet ..  atorvastatin  benzocaine/menthol Lozenge PRN  cyclobenzaprine PRN  DULoxetine  ergocalciferol  heparin  Infusion  HYDROmorphone   Tablet PRN  losartan  metoprolol tartrate  montelukast  multivitamin  polyethylene glycol 3350  pramipexole  pregabalin  senna  Vonoprazan (Voquenza) Tablet 10 milliGRAM(s)      Review of Systems--  A 10-point review of systems was obtained.   Review of systems otherwise negative except as previously noted.    Physical Examination--  Vital Signs: T(F): 98.4 (10-28-24 @ 04:59), Max: 98.4 (10-27-24 @ 11:11)  HR: 93 (10-28-24 @ 04:59)  BP: 138/69 (10-28-24 @ 04:59)  RR: 18 (10-28-24 @ 04:59)  SpO2: 94% (10-28-24 @ 04:59)  Wt(kg): --  General: Nontoxic-appearing Female in no acute distress.  HEENT: AT/NC. Anicteric. Conjunctiva pink and moist. Oropharynx clear.   Neck: Not rigid. No sense of mass.  Nodes: None palpable.  Lungs: Clear bilaterally  Heart: Regular rate and rhythm.   Abdomen: Bowel sounds present and normoactive. Soft. Nondistended. Nontender.  Back: Drain orange turbid fluid  Extremities: No cyanosis or clubbing. No edema.   Skin: Warm. Dry. Good turgor. No rash. No vasculitic stigmata.  Psychiatric: Appropriate affect and mood for situation.       Laboratory Studies--  CBC                        8.6    8.58  )-----------( 487      ( 28 Oct 2024 07:10 )             28.3       Chemistries  10-28    139  |  104  |  21  ----------------------------<  106[H]  4.3   |  23  |  0.79    Ca    9.2      28 Oct 2024 07:08  Phos  3.0     10-28  Mg     1.9     10-28    TPro  6.4  /  Alb  3.4  /  TBili  0.2  /  DBili  x   /  AST  31  /  ALT  29  /  AlkPhos  276[H]  10-28      Culture Data    Culture - Body Fluid with Gram Stain (collected 25 Oct 2024 13:46)  Source: Body Fluid  Gram Stain (25 Oct 2024 21:03):    polymorphonuclear leukocytes seen    Gram positive cocci in pairs seen    by cytocentrifuge  Preliminary Report (26 Oct 2024 21:01):    Rare Staphylococcus aureus  Organism: Staphylococcus aureus (27 Oct 2024 15:13)  Organism: Staphylococcus aureus (27 Oct 2024 15:13)    Culture - Blood (collected 23 Oct 2024 09:55)  Source: .Blood BLOOD  Preliminary Report (27 Oct 2024 16:01):    No growth at 4 days    Culture - Blood (collected 23 Oct 2024 09:40)  Source: .Blood BLOOD  Preliminary Report (27 Oct 2024 16:01):    No growth at 4 days    Culture - Blood (collected 21 Oct 2024 18:02)  Source: .Blood BLOOD  Final Report (27 Oct 2024 01:00):    No growth at 5 days    Culture - Blood (collected 21 Oct 2024 17:58)  Source: .Blood BLOOD  Final Report (27 Oct 2024 01:00):    No growth at 5 days    < from: MICHAEL W or WO Ultrasound Enhancing Agent (10.25.24 @ 15:48) >  CONCLUSIONS:      1. No echocardiographic evidence of vegetations.   2. Left ventricularwall thickness is normal. Left ventricular systolic function is normal. There are no regional wall motion abnormalities seen.   3. Normal right ventricular cavity size, with normal wall thickness, and normal right ventricular systolic function.   4. Patent foramen ovale by color flow Doppler.   5. Small pericardial effusion.    < end of copied text >

## 2024-10-28 NOTE — PROGRESS NOTE ADULT - PROBLEM SELECTOR PLAN 12
Return to emergency department for worsening symptoms or other concerns. Follow-up with urology sometime in the next week to 2 weeks for any ongoing symptoms. Take ibuprofen at home for pain first.  He can also take acetaminophen with this. If pain is still severe, you can take one of the oxycodone. Take Zofran as needed for nausea. Take tamsulosin daily to help facilitate passage of the kidney stone for the next 5 days. Take bactrim as directed twice daily. - DVT prophylaxis: heparin infusion  - Diet: DASH/TLC  - Disposition: Pending course

## 2024-10-28 NOTE — DISCHARGE NOTE PROVIDER - NSDCMRMEDTOKEN_GEN_ALL_CORE_FT
acetaminophen 325 mg oral tablet: 3 tab(s) orally every 8 hours  atorvastatin 40 mg oral tablet: 1 tab(s) orally once a day (at bedtime)  calamine topical lotion: 1 Apply topically to affected area once a day  celecoxib 100 mg oral capsule: 1 cap(s) orally once a day  chlorthalidone 25 mg oral tablet: 1 tab(s) orally once a day  cyclobenzaprine 5 mg oral tablet: 1 tab(s) orally 3 times a day as needed for Muscle Spasm  DULoxetine 20 mg oral delayed release capsule: 1 cap(s) orally once a day  ergocalciferol 1.25 mg (50,000 intl units) oral capsule: 1 cap(s) orally once a week  losartan 100 mg oral tablet: 1 tab(s) orally once a day  melatonin 3 mg oral tablet: 2 tab(s) orally once a day (at bedtime)  metoprolol tartrate 25 mg oral tablet: 0.5 tab(s) orally 2 times a day  MiraLax oral powder for reconstitution: 17 gram(s) orally 2 times a day  montelukast 10 mg oral tablet: 1 tab(s) orally once a day  Multiple Vitamins oral tablet: 1 tab(s) orally once a day  pramipexole 0.75 mg oral tablet: 5 tab(s) orally once a day (at bedtime)  pregabalin 100 mg oral capsule: 1 cap(s) orally 3 times a day  senna leaf extract oral tablet: 2 tab(s) orally once a day (at bedtime)   acetaminophen 325 mg oral tablet: 3 tab(s) orally every 8 hours  atorvastatin 40 mg oral tablet: 1 tab(s) orally once a day (at bedtime)  calamine topical lotion: 1 Apply topically to affected area once a day  ceFAZolin 2 g intravenous injection: 2 gram(s) intravenously every 8 hours Please flush with 10cc NS pre- and post-infusion, to finish 12/12/2024  celecoxib 100 mg oral capsule: 1 cap(s) orally once a day  chlorthalidone 25 mg oral tablet: 1 tab(s) orally once a day  cyclobenzaprine 5 mg oral tablet: 1 tab(s) orally 3 times a day as needed for Muscle Spasm  DULoxetine 20 mg oral delayed release capsule: 1 cap(s) orally once a day  ergocalciferol 1.25 mg (50,000 intl units) oral capsule: 1 cap(s) orally once a week  losartan 100 mg oral tablet: 1 tab(s) orally once a day  melatonin 3 mg oral tablet: 2 tab(s) orally once a day (at bedtime)  metoprolol tartrate 25 mg oral tablet: 0.5 tab(s) orally 2 times a day  MiraLax oral powder for reconstitution: 17 gram(s) orally 2 times a day  montelukast 10 mg oral tablet: 1 tab(s) orally once a day  Multiple Vitamins oral tablet: 1 tab(s) orally once a day  pramipexole 0.75 mg oral tablet: 5 tab(s) orally once a day (at bedtime)  pregabalin 100 mg oral capsule: 1 cap(s) orally 3 times a day  senna leaf extract oral tablet: 2 tab(s) orally once a day (at bedtime)   acetaminophen 325 mg oral tablet: 3 tab(s) orally every 8 hours  apixaban 5 mg oral tablet: 1 tab(s) orally every 12 hours  atorvastatin 40 mg oral tablet: 1 tab(s) orally once a day (at bedtime)  ceFAZolin 2 g intravenous injection: 2 gram(s) intravenous every 8 hours Please administer until 12/12/2024  cyclobenzaprine 10 mg oral tablet: 1 tab(s) orally 3 times a day  Dilaudid 4 mg oral tablet: 1 tab(s) orally every 8 hours as needed for Severe Pain (7 - 10) Hold for sedation  DULoxetine 20 mg oral delayed release capsule: 1 cap(s) orally once a day  ergocalciferol 1.25 mg (50,000 intl units) oral capsule: 1 cap(s) orally once a week  losartan 100 mg oral tablet: 1 tab(s) orally once a day  melatonin 5 mg oral tablet: 1 tab(s) orally once a day (at bedtime)  metoprolol tartrate 25 mg oral tablet: 1 tab(s) orally 2 times a day  montelukast 10 mg oral tablet: 1 tab(s) orally once a day  Multiple Vitamins oral tablet: 1 tab(s) orally once a day  polyethylene glycol 3350 oral powder for reconstitution: 17 gram(s) orally 2 times a day  pramipexole 0.75 mg oral tablet: 1 tab(s) orally once a day (at bedtime)  pregabalin 100 mg oral capsule: 1 cap(s) orally every 8 hours  senna leaf extract oral tablet: 2 tab(s) orally once a day (at bedtime)  Vonoprazan: 1 tab(s) orally once a day   acetaminophen 325 mg oral tablet: 3 tab(s) orally every 8 hours  apixaban 5 mg oral tablet: 1 tab(s) orally every 12 hours  atorvastatin 40 mg oral tablet: 1 tab(s) orally once a day (at bedtime)  ceFAZolin 2 g intravenous injection: 2 gram(s) intravenous every 8 hours Please administer until 12/12/2024  cyclobenzaprine 10 mg oral tablet: 1 tab(s) orally 3 times a day  Dilaudid 4 mg oral tablet: 1 tab(s) orally every 8 hours as needed for Severe Pain (7 - 10) Hold for sedation  DULoxetine 20 mg oral delayed release capsule: 1 cap(s) orally once a day  ergocalciferol 1.25 mg (50,000 intl units) oral capsule: 1 cap(s) orally once a week  erythromycin ethylsuccinate 400 mg/5 mL oral suspension: 128 milligram(s) orally 2 times a day  losartan 100 mg oral tablet: 1 tab(s) orally once a day  melatonin 5 mg oral tablet: 1 tab(s) orally once a day (at bedtime)  metoprolol tartrate 25 mg oral tablet: 1 tab(s) orally 2 times a day  montelukast 10 mg oral tablet: 1 tab(s) orally once a day  Multiple Vitamins oral tablet: 1 tab(s) orally once a day  polyethylene glycol 3350 oral powder for reconstitution: 17 gram(s) orally 2 times a day  pramipexole 0.75 mg oral tablet: 1 tab(s) orally once a day (at bedtime)  pregabalin 100 mg oral capsule: 1 cap(s) orally every 8 hours  senna leaf extract oral tablet: 2 tab(s) orally once a day (at bedtime)  vonoprazan 10 mg oral tablet: 1 tab(s) orally once a day   acetaminophen 325 mg oral tablet: 3 tab(s) orally every 8 hours  apixaban 5 mg oral tablet: 1 tab(s) orally every 12 hours  atorvastatin 40 mg oral tablet: 1 tab(s) orally once a day (at bedtime)  ceFAZolin 2 g intravenous injection: 2 gram(s) intravenous every 8 hours Please administer until 12/12/2024  cyclobenzaprine 10 mg oral tablet: 1 tab(s) orally 3 times a day  Dilaudid 4 mg oral tablet: 1 tab(s) orally every 8 hours as needed for Severe Pain (7 - 10) Hold for sedation  DULoxetine 20 mg oral delayed release capsule: 1 cap(s) orally once a day  Duricef 500 mg oral capsule: 1 cap(s) orally 2 times a day Please start taking on 12/13/2024, after you finish IV antibiotics. Please take until your follow-up appointment with infectious disease.  ergocalciferol 1.25 mg (50,000 intl units) oral capsule: 1 cap(s) orally once a week  erythromycin ethylsuccinate 400 mg/5 mL oral suspension: 128 milligram(s) orally 2 times a day  losartan 100 mg oral tablet: 1 tab(s) orally once a day  melatonin 5 mg oral tablet: 1 tab(s) orally once a day (at bedtime)  metoprolol tartrate 25 mg oral tablet: 1 tab(s) orally 2 times a day  montelukast 10 mg oral tablet: 1 tab(s) orally once a day  Multiple Vitamins oral tablet: 1 tab(s) orally once a day  polyethylene glycol 3350 oral powder for reconstitution: 17 gram(s) orally 2 times a day  pramipexole 0.75 mg oral tablet: 1 tab(s) orally once a day (at bedtime)  pregabalin 100 mg oral capsule: 1 cap(s) orally every 8 hours  senna leaf extract oral tablet: 2 tab(s) orally once a day (at bedtime)  vonoprazan 10 mg oral tablet: 1 tab(s) orally once a day

## 2024-10-28 NOTE — DISCHARGE NOTE PROVIDER - NSDCCPCAREPLAN_GEN_ALL_CORE_FT
PRINCIPAL DISCHARGE DIAGNOSIS  Diagnosis: Sepsis due to methicillin susceptible Staphylococcus aureus  Assessment and Plan of Treatment: You had a spinal infection which we are treating with antibiotics. Please take ancef as prescribed until 12/12/2024. Afterwards, please take oral duricef as prescribed until your follow-up appointment with infectious disease. Please follow-up with the infectious disease clinic after you leave rehab so that you can follow-up on your plan to treat yuor infection. You will also be receiving weekly lab draws at the rehab center while you are on IV antibiotics. If you begin having fever, low blood pressure, or worsening back pain please return to the hospital.      SECONDARY DISCHARGE DIAGNOSES  Diagnosis: Paroxysmal atrial fibrillation  Assessment and Plan of Treatment: We started you on a blood thinner called Apixaban that you should take twice daily as prescribed. We also increased your metoprolol to 25 mg twice daily. If you begin having worsening heart palpitations, please return to the hospital.    Diagnosis: Chronic hypertension  Assessment and Plan of Treatment: Please stop taking your home chlorthalidone and take your medications as prescribed.    Diagnosis: Traumatic compression fracture of T11 thoracic vertebra, closed, initial encounter  Assessment and Plan of Treatment: Please follow-up with neurosurgery for your recent spinal fracture when you leave rehab. We adjusted your pain medications including increasing your dilaudid dosage and flexiril dosage. If you have uncontrollable pain that does not get better, please return to the hospital.    Diagnosis: Liver cyst  Assessment and Plan of Treatment: We found a liver cyst on imaging that did not warrant any further treatment here in the hospital. Please follow-up with your primary care doctor for continued monitoring.

## 2024-10-28 NOTE — DISCHARGE NOTE PROVIDER - CARE PROVIDERS DIRECT ADDRESSES
,DirectAddress_Unknown [email or web address]

## 2024-10-28 NOTE — DISCHARGE NOTE PROVIDER - HOSPITAL COURSE
74-year-old female patient with past medical history of OA, HTN, gastroparesis, peripheral neuropathy, multiple prior thoracolumbar spine surgeries for congenital scoliosis [place] w/ [place] of hardware (1991) and recent fall with T11-T12 fracture, s/p thoracolumbar posterior fusion for t spine fx 9/29, c/b post op csf leak, sent to rehab on 10/17, c/b sepsis with high fever, rigors, tachycardia on 10/20 found to have mssa bacteremia. CT spine no report of collection. Urine +ve E coli, transferred back to [facility] for MICHAEL, advanced spine imaging, and further management, found to have paraspinal abscess on MR imaging, Neurosurgery advised no acute surgical revision necessary, drained as per IR, repeat BCx negative. To continue long-term IV cefazolin via PICC as per ID with eventual transition to PO Duricef. MICHAEL was negative for valvular vegetations but did reveal incidental PFO. Patient was monitored on tele with resolution of AF with source control and electrolyte correction, initiated on DOAc given elevated [id number].    On day of discharge, patient is clinically stable with no new exam findings or acute symptoms compared to prior. The patient was seen by the attending physician on the date of discharge and deemed stable and acceptable for discharge. The patient's chronic medical conditions were treated accordingly per the patient's home medication regimen. The patient's medication reconciliation (with changes made to chronic medications), follow up appointments, discharge orders, instructions, and significant lab and diagnostic studies are as noted.     Discharge diagnoses:   MSSA bacteremia  pAF    Discharge follow up action items:     1. Follow up with PCP in 1-2 weeks.   2. Follow up with   3. Follow up labs, path, & imaging ***  4. Medication changes ***  5. On hold medications ***    Patient's ordered code status: ***    Patient disposition: ***       74-year-old female patient with past medical history of OA, HTN, gastroparesis, peripheral neuropathy, multiple prior thoracolumbar spine surgeries for congenital scoliosis done >10 years ago out of state w/ removal of hardware (1991) and recent fall with T11-T12 fracture, s/p thoracolumbar posterior fusion for t spine fx 9/29, c/b post op csf leak, sent to rehab on 10/17, c/b sepsis with high fever, rigors, tachycardia on 10/20 found to have mssa bacteremia. CT spine no report of collection. Urine +ve E coli, transferred back to [facility] for MICHAEL, advanced spine imaging, and further management, found to have paraspinal abscess on MR imaging, Neurosurgery advised no acute surgical revision necessary, drained as per IR, repeat BCx negative. To continue long-term IV cefazolin via PICC as per ID with eventual transition to PO Duricef. MICHAEL was negative for valvular vegetations but did reveal incidental PFO. Patient was monitored on tele with resolution of AF with source control and electrolyte correction, initiated on DOAc given elevated [id number].    On day of discharge, patient is clinically stable with no new exam findings or acute symptoms compared to prior. The patient was seen by the attending physician on the date of discharge and deemed stable and acceptable for discharge. The patient's chronic medical conditions were treated accordingly per the patient's home medication regimen. The patient's medication reconciliation (with changes made to chronic medications), follow up appointments, discharge orders, instructions, and significant lab and diagnostic studies are as noted.     Discharge diagnoses:   MSSA bacteremia  pAF    Discharge follow up action items:     1. Follow up with PCP in 1-2 weeks.   2. Follow up with Neurosurgery in 1-2 weeks.  3. Follow-up with Infectious Disease in 1-2 weeks.   4. Follow up labs, path, & imaging - weekly lab monitoring with CBC with differential, BMP, ESR, CRP while on antibiotics until last antibiotic date, 12/12/24.   5. Medication changes - Increase Flexiril to 10 mg TID, Dilaudid to 4 mg q8h PO PRN. Cefazolin 2 g q8h until 12/12/24, then likely Duricef 500 mg PO Q12H per infectious disease.   6. On hold medications - None    Patient's ordered code status: FULL CODE    [facility]       74-year-old female patient with past medical history of OA, HTN, gastroparesis, peripheral neuropathy, multiple prior thoracolumbar spine surgeries for congenital scoliosis done >10 years ago out of state w/ removal of hardware (1991) and recent fall with T11-T12 fracture, s/p thoracolumbar posterior fusion for t spine fx 9/29, c/b post op csf leak, sent to rehab on 10/17, c/b sepsis with high fever, rigors, tachycardia on 10/20 found to have mssa bacteremia. CT spine no report of collection. Urine +ve E coli, transferred back to [facility] for MICHAEL, advanced spine imaging, and further management, found to have paraspinal abscess on MR imaging, Neurosurgery advised no acute surgical revision necessary, drained as per IR, repeat BCx negative. To continue long-term IV cefazolin via PICC as per ID with eventual transition to PO Duricef. MICHAEL was negative for valvular vegetations but did reveal incidental PFO. Patient was monitored on tele with resolution of AF with source control and electrolyte correction, initiated on DOAc given elevated [id number].    On day of discharge, patient is clinically stable with no new exam findings or acute symptoms compared to prior. The patient was seen by the attending physician on the date of discharge and deemed stable and acceptable for discharge. The patient's chronic medical conditions were treated accordingly per the patient's home medication regimen. The patient's medication reconciliation (with changes made to chronic medications), follow up appointments, discharge orders, instructions, and significant lab and diagnostic studies are as noted.     Discharge diagnoses:   MSSA bacteremia  pAF    Discharge follow up action items:     1. Follow up with PCP in 1-2 weeks.   2. Follow up with Neurosurgery in 1-2 weeks.  3. Follow-up with Infectious Disease in 1-2 weeks.   4. Follow up labs, path, & imaging - weekly lab monitoring with CBC with differential, BMP, ESR, CRP while on antibiotics until last antibiotic date, 12/12/24.   5. Medication changes - Increase Flexiril to 10 mg TID, Dilaudid to 4 mg q8h PO PRN for pain. Cefazolin 2 g q8h until 12/12/24, then likely Duricef 500 mg PO Q12H per infectious disease.   6. On hold medications - Stop home Celecoxib and Chlorthalidone    Patient's ordered code status: FULL CODE    Patient disposition: [facility]       74-year-old female patient with past medical history of OA, HTN, gastroparesis, peripheral neuropathy, multiple prior thoracolumbar spine surgeries for congenital scoliosis done >10 years ago out of state w/ removal of hardware (1991) and recent fall with T11-T12 fracture, s/p thoracolumbar posterior fusion for t spine fx 9/29, c/b post op csf leak, sent to rehab on 10/17, c/b sepsis with high fever, rigors, tachycardia on 10/20 found to have mssa bacteremia. CT spine no report of collection. Urine +ve E coli, transferred back to [facility] for MICHAEL, advanced spine imaging, and further management, found to have paraspinal abscess on MR imaging, Neurosurgery advised no acute surgical revision necessary, drained as per IR, repeat BCx negative. To continue long-term IV cefazolin via PICC as per ID with eventual transition to PO Duricef. MICHAEL was negative for valvular vegetations but did reveal incidental PFO. Patient was monitored on tele with resolution of AF with source control and electrolyte correction, initiated on DOAc given elevated [id number].    On day of discharge, patient is clinically stable with no new exam findings or acute symptoms compared to prior. The patient was seen by the attending physician on the date of discharge and deemed stable and acceptable for discharge. The patient's chronic medical conditions were treated accordingly per the patient's home medication regimen. The patient's medication reconciliation (with changes made to chronic medications), follow up appointments, discharge orders, instructions, and significant lab and diagnostic studies are as noted.     Discharge diagnoses:   MSSA bacteremia  pAF    Discharge follow up action items:     1. Follow up with PCP in 1-2 weeks. Follow-up on Liver Cyst.   2. Follow up with Neurosurgery in 1-2 weeks.  3. Follow-up with Infectious Disease in 1-2 weeks.   4. Follow up labs, path, & imaging - weekly lab monitoring with CBC with differential, BMP, ESR, CRP while on antibiotics until last antibiotic date, 12/12/24.   5. Medication changes - Start Apixaban 5 mg BID for atrial fibrillation, Increase Metoprolol Tartrate to 25 mg BID, Increase Flexiril to 10 mg TID, Dilaudid to 4 mg q8h PO PRN for pain. Stop home celecoxib and chlorthalidone. Cefazolin 2 g q8h until 12/12/24, then likely Duricef 500 mg PO Q12H per infectious disease.   6. On hold medications - Stop home Celecoxib and Chlorthalidone    Patient's ordered code status: FULL CODE    Patient disposition: [facility]       74-year-old female patient with past medical history of OA, HTN, gastroparesis, peripheral neuropathy, multiple prior thoracolumbar spine surgeries for congenital scoliosis done >10 years ago [place] of state w/ removal of hardware (1991) and recent fall with T11-T12 fracture, s/p thoracolumbar posterior fusion for t spine fx 9/29, c/b post op csf leak, sent to rehab on 10/17, c/b sepsis with high fever, rigors, tachycardia on 10/20 found to have mssa bacteremia. CT spine no report of collection. Urine +ve E coli, transferred back to [facility] for MICHAEL, advanced spine imaging, and further management, found to have paraspinal abscess on MR imaging, Neurosurgery advised no acute surgical revision necessary, drained as per IR, repeat BCx negative. To continue long-term IV cefazolin via PICC as per ID with eventual transition to PO Duricef. MICHAEL was negative for valvular vegetations but did reveal incidental PFO. Patient was monitored on tele with resolution of AF with source control and electrolyte correction, initiated on DOAc given elevated [id number].    On day of discharge, patient is clinically stable with no new exam findings or acute symptoms compared to prior. The patient was seen by the attending physician on the date of discharge and deemed stable and acceptable for discharge. The patient's chronic medical conditions were treated accordingly per the patient's home medication regimen. The patient's medication reconciliation (with changes made to chronic medications), follow up appointments, discharge orders, instructions, and significant lab and diagnostic studies are as noted.     Discharge diagnoses:   MSSA bacteremia  pAF    Discharge follow up action items:     1. Follow up with PCP in 1-2 weeks. Follow-up on Liver Cyst.   2. Follow up with Neurosurgery in 1-2 weeks.  3. Follow-up with Infectious Disease in 1-2 weeks.   4. Follow up labs, path, & imaging - weekly lab monitoring with CBC with differential, BMP, ESR, CRP while on antibiotics until last antibiotic date, 12/12/24.   5. Medication changes - Start Apixaban 5 mg BID for atrial fibrillation, Increase Metoprolol Tartrate to 25 mg BID, Increase Flexiril to 10 mg TID, Dilaudid to 4 mg q8h PO PRN for pain. Stop home celecoxib and chlorthalidone. Cefazolin 2 g q8h until 12/12/24, then likely Duricef 500 mg PO Q12H per infectious disease.   6. On hold medications - Stop home Celecoxib and Chlorthalidone    Patient's ordered code status: FULL CODE    Patient disposition: [facility].

## 2024-10-28 NOTE — PROGRESS NOTE ADULT - SUBJECTIVE AND OBJECTIVE BOX
DATE OF SERVICE: 10-28-24 @ 06:54  --------------------------------------------------------------  Cody Sparks MD  PGY-3, Internal Medicine  [employer] Teams preferred  Pager #: [phone], [facility] [phone]  After 7 PM: Night Float Pager  --------------------------------------------------------------      Patient is a 74y old  Female who presents with a chief complaint of Bacteremia (28 Oct 2024 05:30)      SUBJECTIVE / OVERNIGHT EVENTS:  No acute events overnight, patient reports feeling well and all questions answered at this time.     Additional Review of Systems:  Denies any other acute sx including f/c, HA, cough, sore throat, eye/ear changes, rhinorrhea, CP, palpitations, SOB, n/v, abdominal pain, back pain, bowel/bladder changes, fatigue, numbness or tingling.    MEDICATIONS  (STANDING):  acetaminophen     Tablet .. 975 milliGRAM(s) Oral every 8 hours  atorvastatin 40 milliGRAM(s) Oral at bedtime  ceFAZolin   IVPB 2000 milliGRAM(s) IV Intermittent every 8 hours  DULoxetine 20 milliGRAM(s) Oral daily  ergocalciferol 98317 Unit(s) Oral <User Schedule>  erythromycin    ethylsuccinate Suspension 40 mG/mL 128 milliGRAM(s) Oral every 12 hours  heparin  Infusion 1700 Unit(s)/Hr (16 mL/Hr) IV Continuous <Continuous>  losartan 100 milliGRAM(s) Oral daily  metoprolol tartrate 25 milliGRAM(s) Oral two times a day  montelukast 10 milliGRAM(s) Oral daily  multivitamin 1 Tablet(s) Oral daily  polyethylene glycol 3350 17 Gram(s) Oral two times a day  pramipexole 0.75 milliGRAM(s) Oral <User Schedule>  pregabalin 100 milliGRAM(s) Oral every 8 hours  senna 2 Tablet(s) Oral at bedtime  Vonoprazan (Voquenza) Tablet 10 milliGRAM(s) 1 Tablet(s) Oral daily    MEDICATIONS  (PRN):  benzocaine/menthol Lozenge 1 Lozenge Oral every 3 hours PRN Mouth Sores  cyclobenzaprine 5 milliGRAM(s) Oral three times a day PRN Muscle Spasm  HYDROmorphone   Tablet 2 milliGRAM(s) Oral every 8 hours PRN Severe Pain (7 - 10)      Vital Signs Last 24 Hrs  T(C): 36.9 (28 Oct 2024 04:59), Max: 36.9 (27 Oct 2024 11:11)  T(F): 98.4 (28 Oct 2024 04:59), Max: 98.4 (27 Oct 2024 11:11)  HR: 93 (28 Oct 2024 04:59) (78 - 93)  BP: 138/69 (28 Oct 2024 04:59) (137/81 - 180/83)  BP(mean): --  RR: 18 (28 Oct 2024 04:59) (18 - 19)  SpO2: 94% (28 Oct 2024 04:59) (94% - 97%)    Parameters below as of 28 Oct 2024 04:59  Patient On (Oxygen Delivery Method): room air      CAPILLARY BLOOD GLUCOSE        I&O's Summary    26 Oct 2024 07:01  -  27 Oct 2024 07:00  --------------------------------------------------------  IN: 912 mL / OUT: 200 mL / NET: 712 mL    27 Oct 2024 07:01  -  28 Oct 2024 06:54  --------------------------------------------------------  IN: 780 mL / OUT: 0 mL / NET: 780 mL        PHYSICAL EXAM:  GENERAL: Clinically well-appearing and comfortable  HEAD:  Atraumatic, Normocephalic  EYES: EOMI, PERRL, conjunctiva and sclera clear  NECK: Supple, No JVD  CHEST/LUNG: Clear to auscultation bilaterally; No wheeze  HEART: Regular rate and rhythm; No murmurs, rubs, or gallops  ABDOMEN: Soft, Nontender, Nondistended; Bowel sounds present  EXTREMITIES:  2+ Peripheral Pulses, No clubbing, cyanosis, or edema  PSYCH: Normal mood, Normal affect  NEUROLOGY: non-focal, moving all four extremities  SKIN: No rashes or lesions    LABS:                        7.8    8.50  )-----------( 377      ( 27 Oct 2024 07:24 )             26.4     10-27    138  |  106  |  20  ----------------------------<  95  4.6   |  18[L]  |  0.64    Ca    8.7      27 Oct 2024 07:24  Phos  2.7     10-27  Mg     2.1     10-27    TPro  6.1  /  Alb  3.3  /  TBili  0.2  /  DBili  x   /  AST  45[H]  /  ALT  34  /  AlkPhos  234[H]  10-27    PTT - ( 27 Oct 2024 00:47 )  PTT:66.0 sec      Urinalysis Basic - ( 27 Oct 2024 07:24 )    Color: x / Appearance: x / SG: x / pH: x  Gluc: 95 mg/dL / Ketone: x  / Bili: x / Urobili: x   Blood: x / Protein: x / Nitrite: x   Leuk Esterase: x / RBC: x / WBC x   Sq Epi: x / Non Sq Epi: x / Bacteria: x        RADIOLOGY & ADDITIONAL TESTS:    Imaging Personally Reviewed:    Consultant(s) Notes Reviewed:      Care Discussed with Consultants/Other Providers:

## 2024-10-28 NOTE — DISCHARGE NOTE PROVIDER - NSFOLLOWUPCLINICS_GEN_ALL_ED_FT
[facility] Neurosurgery  Neurosurgery  Referral Assistance Program  [place]   Phone:   Fax:     [facility] - Infectious Disease  Infectious Disease  [address], [place] [address]  Phone: [phone]  Fax:

## 2024-10-28 NOTE — DISCHARGE NOTE PROVIDER - PROVIDER TOKENS
PROVIDER:[TOKEN:[[id number]]] PROVIDER:[TOKEN:[[id number]]],PROVIDER:[TOKEN:[[id number]]]

## 2024-10-28 NOTE — ADVANCED PRACTICE NURSE CONSULT - ASSESSMENT
PICC Line Insertion Note    Patient or patient representative educated about central line associated blood stream infection prevention practices.  Catheter type: 4 F,  SL Picc  : Bard  Power injectable: Yes  LOT# [id number]    Informed consent obtained by covering floor team.  Procedure assisted by: CAPO Ackerman RN  Time out was performed, confirming the patient's first and last name, date of birth, procedure, and correct site prior to start of procedure.    Patient was placed with HOB 30 degrees. A mask and cap provided for patient. Patient placement site was prepped with chlorhexidine solution, then draped using maximum sterile barrier protection. The area was injected with 2 ml of 1% lidocaine. Using the Bard Site Rite 8, the catheter was placed using the Modified Seldinger Technique. Strict adherence to outline aseptic technique including handwashing, glove and gown, utilizing mask and cap, plus draping the patient with a sterile drape was observed. Upon completion of line placement, the insertion site was covered with a sterile CHG dressing. Pt tolerated procedure well. V/S: WDL      All materials used for catheter insertion, including the intact guide wires, were accounted for at the end of the procedure.  Number of attempts: 1  Complications/Comments: None  Emergency Placement: No    Site: New  Anatomical Site of insertion: Right Basilic vein  Catheter size/length: 4F, 38cm  US guided Bard Single lumen power picc placed.    Post procedure verification with chest Xray as per orders.

## 2024-10-28 NOTE — DISCHARGE NOTE PROVIDER - NSDCCPTREATMENT_GEN_ALL_CORE_FT
PRINCIPAL PROCEDURE  Procedure: Drainage, abscess, soft tissue, with imaging guidance  Findings and Treatment: You have had a paraspinal abscess drain placed by Dr. Guerra on 10/25 in [facility] Radiology (IR).   Monitor site for any sign or symptoms of infection (painful red skin, green/ yellow foul smelling discharge from the insertion site, fever, chills, leakage around drain site).   Flush drain with 5mL NS daily. If you meet resistance upon flushing, STOP and contact IR.   Empty drainage bag or bulb daily and record output.   Regarding outpatient follow up, once output is <10mL in a 24hr period or if there is a sudden decrease in output please contact IR to schedule  an appointment. [phone].  Drain care:  -Disconnect tubing (tube attached to bag/ bulb)  from the catheter (catheter going into skin)  -Clean catheter with alcohol swab  -Twist on the flush syringe to the catheter (be sure to push the air out of syringe)  -Flush catheter with 5mL (DO NOT pull back on syringe). If you meet resistance upon flushing, STOP and contact IR.   -Disconnect syringe from catheter and reconnect drainage bag or bulb.  Dressing care:  -Wash hands thoroughly with water and soap for at least 20 seconds.   -take off old dressing and clean around drain site with gauze soaked with warm water  -After cleaning the site, dry and replace dressing with a new gauze and tegaderm.   -Place one piece of gauze underneath the drain and another piece of gauze on top of drain.   -Apply tegaderm (clear dressing) on top.  -Change dressing every 3 days or when soiled  Please feel free to contact us at [phone] if any problems arise. After 6PM, Monday through Friday, on weekends and on holidays, please call [phone] and ask for the radiology resident on call to be paged.

## 2024-10-28 NOTE — PROGRESS NOTE ADULT - ATTENDING COMMENTS
73 yo F with PMH of HTN, gastroparesis, peripheral neuropathy, multiple prior thoracolumbar spine surgeries for congenital scoliosis presents with back pain s/p mechanical fall from sitting. Found with T12 three column spine fracture-malalignment with fracture extending to the adjacent right 12th posterior rib and left T12-L1 facet joint s/p T9-L3 fusion on 9/29 post-op course c/b recurrent brief Afib discharged to acute rehab where she had RRT 2/2 to sepsis, BCX + MSSA tx to [facility] for further eval      #Sepsis 2/2 to MSSA bacteremia  # Paraspinal collection  #Paroxysmal atrial fibrillation  #HTN  # Depression  # Acute on Chronic back pain      - MRI T/L spine 8.0 x 7.0 x 3.0 (CC x TV x AP) rim-enhancing heterogeneous fluid collection in the dorsal paraspinal through superficial subcutaneous tissues at the T7-L4 levels; 8.0 x 7.0 x 3.0 (CC x TV x AP) rim-enhancing heterogeneous fluid collection in the dorsal paraspinal through   superficial subcutaneous tissues at the T7-L4 levels  -s/p IR guided aspiration of paraspinal fluid collection 10/25 and drainage, cultures with gram pos cocci, b2 transferrin pending, if AMS then clamp call NSG, IR    - needs PICC line BCX NGTD 10/21, 10/23- will obtain PICC 10/28 and then dc planning to acute rehab  - c/w Ancef 2gm q8hr till 12/12/24 then duricef indefinately per ID recs, repeat BCX 10/23 NGTD  - likely source of sepsis is MSSA bacteremia is paraspinal collection 4/4 +MSSA 10/20, less likely UTI- UCX with E coli and Klebsiella but patient asymptomatic  - TTE 10/23- no vegetations, MICHAEL no vegetations, PFO+  - NSG c/s appreciated  - also found to be back in a fib- ekg confirmed- tele monitoring- CHADSVASC is 5- hep gtt for now after PICC line placed would transition to DOAC  - c/w Hydromorphone 2mg PO q8hr for severe pain, senna, Miralax for bowel regimen; requiring less pain medication post-drainage  - c/w home losartan, metoprolol to 25mg BID  - dc tele for now .

## 2024-10-28 NOTE — PROGRESS NOTE ADULT - ASSESSMENT
Patient is a 74-year-old female patient with past medical history of OA, HTN, gastroparesis, peripheral neuropathy, multiple prior thoracolumbar spine surgeries for congenital scoliosis [place] >[place]/ [place] of hardware (1991) who presented to [facility] on 9/28/24 with back pain and left hip pain s/p mechanical fall. CT imaging was performed and reported a T12 three column spine fracture-malalignment with fracture extending to the adjacent right 12th posterior rib and left T12-L1 facet joint and acute, displaced fracture of the left eighth lateral rib. Chronic appearing bilateral rib deformities. Surgical management was recommended and she is S/P T9-L3 open fusion with PSO/lag screw partial corpectomy/interbody by Dr. Stroud, Neurosurgeon with complex Plastic Surgery closure by Dr. Elias on 9/29/24. Post op course c/b CSF leak s/p repair, new onset paroxsymal Afib with RVR,  increased pain and drainage from surgical incision, development of pleural effusions, symptomatic orthostatic hypotension, urinary retention, post op anemia, and chest pain with spontaneous resolution. Cardiac work up negative. Patient was evaluated by PM&R and therapy for functional deficits, gait/ADL impairments and acute rehabilitation was recommended. Patient was cleared for discharge to [facility] IRF on 10/16/24.     Rehab course complicated by RRT on 10/20 for fever of 103.9 f, tachycardia, and AMS. Infectious workup following RRT notable for leukocytosis to 10.58, lactic acid 2.6, U/A with positive nitrites, large LE, 25 WBCs, urine culture growing klebsiella aerogenes and e. coli, and 10/20 blood cultures x 2 with MSSA. Repeat blood cultures on 10/21 with NGTD. CT head without acute pathology. CT T and L spine: Unremarkable CT scan of the thoracic spine.   No vertebral fracture is recognized. ID was consulted while at [facility] who recommended Transfer request initiated from [place] to [facility] for MICHAEL and completion of bacteremia workup/treatment.      Patient has had unchanged persistent back pain since surgery. Other than spinal hardware, patient reports she had a right knee replacement this year, two hip replacements (one 2 years ago and one 20 years ago), and right shoulder replacement 10 years ago. States none of these sites have been bothering her. Denies any prosthetic heart valves or murmurs. Clincal exam notable for fluctuance over lower L spine.  MRI imaging performed now showing 28.0 x 7.0 x 3.0 (CC x TV x AP) rim-enhancing heterogeneous fluid collection in the dorsal paraspinal through superficial subcutaneous tissues at the T7-L4 levels. Now s/p IR aspiration on 10/25.     #MSSA bacteremia  #recent spinal surgery with hardware  #presence of other prosthetic joints   #rim enhancing fluid collection in T7-L4 region, s/p drain placement      10/28: Aspiration with MSSA. Beta-2-transferrin pending. Patient doing well overall. F/U Blood cx NTD. For PICC today.     Suggestions--  Continue IV Cefazolin  PFO- as per primary  Concur with PICC (no ID objection at this time) and rehab placement  Repeat imaging/drain management as per surgery and IR    Drug: Cefazolin  Dose: 2g  Route: IVPB  Frequency: q8H  Stop date: would treat IV 6 weeks from aspiration i.e. last day of IV abx is 12/12/24.  After IV antibiotics completed would begin Duricef 500mg PO Q12H-- long term and perhaps indefinitely  Labs & Other Monitoring: at rehab weekly CBC Diff BMP ESR CRP. While physicians at rehab will need to be responsible for monitoring these, happy to assist as needed. Labs, orders, and other correspondence should be emailed to [email or web address]  Follow Up: nonurgently in office    Reviewed with patient. All questions answered to the best of my ability.     Kaushal Waterman MD  Attending Physician  [facility]  Division of Infectious Diseases  [phone]

## 2024-10-28 NOTE — DISCHARGE NOTE PROVIDER - NSDCFUADDAPPT_GEN_ALL_CORE_FT
APPTS ARE READY TO BE MADE: [ ] YES    Best Family or Patient Contact (if needed):    Additional Information about above appointments (if needed):    1:   2:   3:     Other comments or requests:    APPTS ARE READY TO BE MADE: [ ] YES    Best Family or Patient Contact (if needed):    Additional Information about above appointments (if needed):    1:   2:   3:     Other comments or requests:       You may call the IR booking office @ [phone] to make a 7-10 day follow up appointment for drain evaluation. Please feel free to contact us at [phone] if any problems arise. After 6PM, Monday through Friday, on weekends and on holidays, please call [phone] and ask for the radiology resident on call to be paged.    APPTS ARE READY TO BE MADE: [ X ] YES    Best Family or Patient Contact (if needed):    Additional Information about above appointments (if needed):    1: PCP (Dr. Carr)  2: Neurosurgery  3: Infectious Disease    Other comments or requests:       You may call the IR booking office @ [phone] to make a 7-10 day follow up appointment for drain evaluation. Please feel free to contact us at [phone] if any problems arise. After 6PM, Monday through Friday, on weekends and on holidays, please call [phone] and ask for the radiology resident on call to be paged.    APPTS ARE READY TO BE MADE: [ X ] YES    Best Family or Patient Contact (if needed):    Additional Information about above appointments (if needed):    1: PCP (Dr. Carr)  2: Neurosurgery  3: Infectious Disease    Other comments or requests:     Patient is being discharged to [facility]. Caregiver will arrange follow up.    You may call the IR booking office @ [phone] to make a 7-10 day follow up appointment for drain evaluation. Please feel free to contact us at [phone] if any problems arise. After 6PM, Monday through Friday, on weekends and on holidays, please call [phone] and ask for the radiology resident on call to be paged.

## 2024-10-28 NOTE — ADVANCED PRACTICE NURSE CONSULT - REASON FOR CONSULT
Vascular Access Team    Evaluation for: Bedside SL PICC placement  Requested by name: Sukumar Sparks  Date/Time: 10/28 @ 6:36    Indication: s/p thoracolumbar fusion c/b MSSA bacteremia/ IV antibiotics >30days  Allergy to CHG or Heparin or Lidocaine: no    Platelets(>20): 487  INR(<3): 1.07  eGFR(>40): 77  Blood cultures sent: 10/23  Blood culture negative in 48hrs: NG X 4days  Anticoagulants: Heparin gtt  Arms DVT: no  Mastectomy: no  Fistula: no  PPM/Defib: no  IR or Nephrology or ID clearance needed: no    Consent obtained: no    Pending: Concent  Plan: Bedside picc order evaluated. Please obtain PICC placement consent and then call [phone] for the VAT RN to place the bedside picc.  
Bedside picc order placed.   Indication: SL picc placement for long term antibiotics.

## 2024-10-28 NOTE — PROGRESS NOTE ADULT - ASSESSMENT
74-year-old female patient with past medical history of OA, HTN, gastroparesis, peripheral neuropathy, multiple prior thoracolumbar spine surgeries for congenital scoliosis done >[place] w/ removal of hardware (1991) and recent fall with T11-T12 fracture, s/p thoracolumbar posterior fusion for t spine fx 9/29, c/b post op csf leak, sent to rehab on 10/17, c/b sepsis with high fever, rigors, tachycardia on 10/20 found to have mssa bacteremia. CT spine no report of collection. Urine +ve E coli, transferred back to [facility] for MICHAEL, advanced spine imaging, and further management of MSSA bacteremia.

## 2024-10-29 LAB
HCT VFR BLD CALC: 27 % — LOW (ref 34.5–45)
HGB BLD-MCNC: 8.1 G/DL — LOW (ref 11.5–15.5)
MCHC RBC-ENTMCNC: 26.6 PG — LOW (ref 27–34)
MCHC RBC-ENTMCNC: 30 GM/DL — LOW (ref 32–36)
MCV RBC AUTO: 88.8 FL — SIGNIFICANT CHANGE UP (ref 80–100)
NRBC # BLD: 1 /100 WBCS — HIGH (ref 0–0)
PLATELET # BLD AUTO: 415 K/UL — HIGH (ref 150–400)
RBC # BLD: 3.04 M/UL — LOW (ref 3.8–5.2)
RBC # FLD: 16 % — HIGH (ref 10.3–14.5)
WBC # BLD: 7.24 K/UL — SIGNIFICANT CHANGE UP (ref 3.8–10.5)
WBC # FLD AUTO: 7.24 K/UL — SIGNIFICANT CHANGE UP (ref 3.8–10.5)

## 2024-10-29 PROCEDURE — 99233 SBSQ HOSP IP/OBS HIGH 50: CPT

## 2024-10-29 PROCEDURE — 99239 HOSP IP/OBS DSCHRG MGMT >30: CPT

## 2024-10-29 RX ORDER — MELATONIN 5 MG
5 TABLET ORAL AT BEDTIME
Refills: 0 | Status: DISCONTINUED | OUTPATIENT
Start: 2024-10-29 | End: 2024-10-30

## 2024-10-29 RX ORDER — CYCLOBENZAPRINE HYDROCHLORIDE 30 MG/1
5 CAPSULE, EXTENDED RELEASE ORAL THREE TIMES A DAY
Refills: 0 | Status: DISCONTINUED | OUTPATIENT
Start: 2024-10-29 | End: 2024-10-30

## 2024-10-29 RX ADMIN — Medication 2 MILLIGRAM(S): at 23:34

## 2024-10-29 RX ADMIN — Medication 5 MILLIGRAM(S): at 23:34

## 2024-10-29 RX ADMIN — Medication 2 MILLIGRAM(S): at 05:18

## 2024-10-29 RX ADMIN — Medication 128 MILLIGRAM(S): at 05:06

## 2024-10-29 RX ADMIN — Medication 975 MILLIGRAM(S): at 14:36

## 2024-10-29 RX ADMIN — CYCLOBENZAPRINE HYDROCHLORIDE 5 MILLIGRAM(S): 30 CAPSULE, EXTENDED RELEASE ORAL at 02:35

## 2024-10-29 RX ADMIN — Medication 40 MILLIGRAM(S): at 21:10

## 2024-10-29 RX ADMIN — Medication 975 MILLIGRAM(S): at 05:26

## 2024-10-29 RX ADMIN — Medication 25 MILLIGRAM(S): at 05:07

## 2024-10-29 RX ADMIN — Medication 2 TABLET(S): at 21:11

## 2024-10-29 RX ADMIN — Medication 100 MILLIGRAM(S): at 05:06

## 2024-10-29 RX ADMIN — POLYETHYLENE GLYCOL 3350 17 GRAM(S): 17 POWDER, FOR SOLUTION ORAL at 05:07

## 2024-10-29 RX ADMIN — APIXABAN 5 MILLIGRAM(S): 5 TABLET, FILM COATED ORAL at 17:36

## 2024-10-29 RX ADMIN — PRAMIPEXOLE DIHYDROCHLORIDE 0.75 MILLIGRAM(S): 0.12 TABLET ORAL at 20:10

## 2024-10-29 RX ADMIN — DULOXETINE HYDROCHLORIDE 20 MILLIGRAM(S): 30 CAPSULE, DELAYED RELEASE ORAL at 11:11

## 2024-10-29 RX ADMIN — Medication 100 MILLIGRAM(S): at 13:03

## 2024-10-29 RX ADMIN — Medication 2 MILLIGRAM(S): at 17:05

## 2024-10-29 RX ADMIN — MONTELUKAST SODIUM 10 MILLIGRAM(S): 10 TABLET, FILM COATED ORAL at 11:11

## 2024-10-29 RX ADMIN — Medication 975 MILLIGRAM(S): at 22:10

## 2024-10-29 RX ADMIN — Medication 975 MILLIGRAM(S): at 13:06

## 2024-10-29 RX ADMIN — APIXABAN 5 MILLIGRAM(S): 5 TABLET, FILM COATED ORAL at 05:22

## 2024-10-29 RX ADMIN — Medication 975 MILLIGRAM(S): at 05:07

## 2024-10-29 RX ADMIN — Medication 25 MILLIGRAM(S): at 17:35

## 2024-10-29 RX ADMIN — Medication 2 MILLIGRAM(S): at 05:26

## 2024-10-29 RX ADMIN — PREGABALIN 100 MILLIGRAM(S): 150 CAPSULE ORAL at 21:09

## 2024-10-29 RX ADMIN — Medication 2 MILLIGRAM(S): at 16:36

## 2024-10-29 RX ADMIN — LOSARTAN POTASSIUM 100 MILLIGRAM(S): 25 TABLET ORAL at 05:07

## 2024-10-29 RX ADMIN — PREGABALIN 100 MILLIGRAM(S): 150 CAPSULE ORAL at 13:05

## 2024-10-29 RX ADMIN — Medication 100 MILLIGRAM(S): at 21:13

## 2024-10-29 RX ADMIN — Medication 975 MILLIGRAM(S): at 21:09

## 2024-10-29 RX ADMIN — PREGABALIN 100 MILLIGRAM(S): 150 CAPSULE ORAL at 05:07

## 2024-10-29 RX ADMIN — Medication 1 TABLET(S): at 11:10

## 2024-10-29 RX ADMIN — CYCLOBENZAPRINE HYDROCHLORIDE 5 MILLIGRAM(S): 30 CAPSULE, EXTENDED RELEASE ORAL at 13:07

## 2024-10-29 RX ADMIN — CYCLOBENZAPRINE HYDROCHLORIDE 5 MILLIGRAM(S): 30 CAPSULE, EXTENDED RELEASE ORAL at 21:10

## 2024-10-29 NOTE — PROGRESS NOTE ADULT - ASSESSMENT
74-year-old female patient with past medical history of OA, HTN, gastroparesis, peripheral neuropathy, multiple prior thoracolumbar spine surgeries for congenital scoliosis done >10 years ago [place] of [place] w/ removal of hardware (1991) and recent fall with T11-T12 fracture, s/p thoracolumbar posterior fusion for t spine fx 9/29, c/b post op csf leak, sent to rehab on 10/17, c/b sepsis with high fever, rigors, tachycardia on 10/20 found to have mssa bacteremia. CT spine no report of collection. Urine +ve E coli, transferred back to [facility] for MICHAEL, advanced spine imaging, and further management of MSSA bacteremia.

## 2024-10-29 NOTE — PROGRESS NOTE ADULT - SUBJECTIVE AND OBJECTIVE BOX
Patient seen and examined at bedside.    --Anticoagulation--  apixaban 5 milliGRAM(s) Oral every 12 hours    T(C): 37.3 (10-29-24 @ 05:00), Max: 37.3 (10-29-24 @ 05:00)  HR: 95 (10-29-24 @ 05:00) (85 - 98)  BP: 150/74 (10-29-24 @ 05:00) (132/66 - 152/76)  RR: 18 (10-29-24 @ 05:00) (18 - 18)  SpO2: 94% (10-29-24 @ 05:00) (94% - 95%)  Wt(kg): --    Exam:  AOx3, BUE 5/5, BLE 5/5, incision c/d/i

## 2024-10-29 NOTE — PROGRESS NOTE ADULT - PROBLEM SELECTOR PLAN 3
- S/P T9-L3 open fusion with PSO/lag screw partial corpectomy/interbody with complex plastic closure (w/ Dr. Stroud and Dr. Tinajero on 9/29)   - TLSO when OOB  - Previous pain management: Tylenol 975mg ATC, Duloxetine 20mg daily, Lyrica 100mg TID , Flexeril 5mg TID PRN,   - Start hydromorphone 2 mg PO q6  - Consulted neurosug, f/u reccs - S/P T9-L3 open fusion with PSO/lag screw partial corpectomy/interbody with complex plastic closure (w/ Dr. Stroud and Dr. Tinajero on 9/29)   - TLSO when OOB  - Previous pain management: Tylenol 975mg ATC, Duloxetine 20mg daily, Lyrica 100mg TID , Flexeril 5mg TID standing,   - Start hydromorphone 2 mg PO q6  - Consulted neurosug, f/u reccs

## 2024-10-29 NOTE — PROGRESS NOTE ADULT - SUBJECTIVE AND OBJECTIVE BOX
***************************************************************  Yunioruvaldo Ferreira) [facility] PGY3  Internal Medicine   ***************************************************************    VIRGINIA HUFFMAN  74y  MRN: [id number]    Patient is a 74y old  Female who presents with a chief complaint of Bacteremia (29 Oct 2024 05:17)      Subjective:     MEDICATIONS  (STANDING):  acetaminophen     Tablet .. 975 milliGRAM(s) Oral every 8 hours  apixaban 5 milliGRAM(s) Oral every 12 hours  atorvastatin 40 milliGRAM(s) Oral at bedtime  ceFAZolin   IVPB 2000 milliGRAM(s) IV Intermittent every 8 hours  DULoxetine 20 milliGRAM(s) Oral daily  ergocalciferol 07348 Unit(s) Oral <User Schedule>  erythromycin    ethylsuccinate Suspension 40 mG/mL 128 milliGRAM(s) Oral every 12 hours  losartan 100 milliGRAM(s) Oral daily  metoprolol tartrate 25 milliGRAM(s) Oral two times a day  montelukast 10 milliGRAM(s) Oral daily  multivitamin 1 Tablet(s) Oral daily  polyethylene glycol 3350 17 Gram(s) Oral two times a day  pramipexole 0.75 milliGRAM(s) Oral <User Schedule>  pregabalin 100 milliGRAM(s) Oral every 8 hours  senna 2 Tablet(s) Oral at bedtime  Vonoprazan (Voquenza) Tablet 10 milliGRAM(s) 1 Tablet(s) Oral daily    MEDICATIONS  (PRN):  benzocaine/menthol Lozenge 1 Lozenge Oral every 3 hours PRN Mouth Sores  cyclobenzaprine 5 milliGRAM(s) Oral three times a day PRN Muscle Spasm  HYDROmorphone   Tablet 2 milliGRAM(s) Oral every 8 hours PRN Severe Pain (7 - 10)      Objective:    Vitals: Vital Signs Last 24 Hrs  T(C): 37.3 (10-29-24 @ 05:00), Max: 37.3 (10-29-24 @ 05:00)  T(F): 99.1 (10-29-24 @ 05:00), Max: 99.1 (10-29-24 @ 05:00)  HR: 95 (10-29-24 @ 05:00) (85 - 98)  BP: 150/74 (10-29-24 @ 05:00) (132/66 - 152/76)  BP(mean): --  RR: 18 (10-29-24 @ 05:00) (18 - 18)  SpO2: 94% (10-29-24 @ 05:00) (94% - 95%)            I&O's Summary    27 Oct 2024 07:01  -  28 Oct 2024 07:00  --------------------------------------------------------  IN: 780 mL / OUT: 0 mL / NET: 780 mL    28 Oct 2024 07:01  -  29 Oct 2024 06:46  --------------------------------------------------------  IN: 540 mL / OUT: 475 mL / NET: 65 mL        PHYSICAL EXAM:  GENERAL: NAD  HEAD:  Atraumatic, Normocephalic  EYES: EOMI, conjunctiva and sclera clear  CHEST/LUNG: Clear to auscultation bilaterally; No rales, rhonchi, wheezing, or rubs  HEART: Regular rate and rhythm; No murmurs, rubs, or gallops  ABDOMEN: Soft, Nontender, Nondistended;   SKIN: No rashes or lesions  NERVOUS SYSTEM:  Alert & Oriented X3, no focal deficits    LABS:  10-28    139  |  104  |  21  ----------------------------<  106[H]  4.3   |  23  |  0.79  10-27    138  |  106  |  20  ----------------------------<  95  4.6   |  18[L]  |  0.64    Ca    9.2      28 Oct 2024 07:08  Ca    8.7      27 Oct 2024 07:24  Phos  3.0     10-28  Mg     1.9     10-28    TPro  6.4  /  Alb  3.4  /  TBili  0.2  /  DBili  x   /  AST  31  /  ALT  29  /  AlkPhos  276[H]  10-28  TPro  6.1  /  Alb  3.3  /  TBili  0.2  /  DBili  x   /  AST  45[H]  /  ALT  34  /  AlkPhos  234[H]  10-27      PTT - ( 28 Oct 2024 07:10 )  PTT:45.7 sec              Urinalysis Basic - ( 28 Oct 2024 07:08 )    Color: x / Appearance: x / SG: x / pH: x  Gluc: 106 mg/dL / Ketone: x  / Bili: x / Urobili: x   Blood: x / Protein: x / Nitrite: x   Leuk Esterase: x / RBC: x / WBC x   Sq Epi: x / Non Sq Epi: x / Bacteria: x                              8.1    7.24  )-----------( 415      ( 29 Oct 2024 06:19 )             27.0                         8.6    8.58  )-----------( 487      ( 28 Oct 2024 07:10 )             28.3                         7.8    8.50  )-----------( 377      ( 27 Oct 2024 07:24 )             26.4     CAPILLARY BLOOD GLUCOSE          RADIOLOGY & ADDITIONAL TESTS:    Imaging Personally Reviewed:  [x ] YES  [ ] NO    Consultants involved in case:   Consultant(s) Notes Reviewed:  [ x] YES  [ ] NO:   Care Discussed with Consultants/Other Providers [x ] YES  [ ] NO         ***************************************************************  Yunior Hanna) [facility] PGY3  Internal Medicine   ***************************************************************    VIRGINIA HUFFMAN  74y  MRN: [id number]    Patient is a 74y old  Female who presents with a chief complaint of Bacteremia (29 Oct 2024 05:17)      Subjective: NAEO. Reporting some continuing back pain and chest wall pain.     MEDICATIONS  (STANDING):  acetaminophen     Tablet .. 975 milliGRAM(s) Oral every 8 hours  apixaban 5 milliGRAM(s) Oral every 12 hours  atorvastatin 40 milliGRAM(s) Oral at bedtime  ceFAZolin   IVPB 2000 milliGRAM(s) IV Intermittent every 8 hours  DULoxetine 20 milliGRAM(s) Oral daily  ergocalciferol 36582 Unit(s) Oral <User Schedule>  erythromycin    ethylsuccinate Suspension 40 mG/mL 128 milliGRAM(s) Oral every 12 hours  losartan 100 milliGRAM(s) Oral daily  metoprolol tartrate 25 milliGRAM(s) Oral two times a day  montelukast 10 milliGRAM(s) Oral daily  multivitamin 1 Tablet(s) Oral daily  polyethylene glycol 3350 17 Gram(s) Oral two times a day  pramipexole 0.75 milliGRAM(s) Oral <User Schedule>  pregabalin 100 milliGRAM(s) Oral every 8 hours  senna 2 Tablet(s) Oral at bedtime  Vonoprazan (Voquenza) Tablet 10 milliGRAM(s) 1 Tablet(s) Oral daily    MEDICATIONS  (PRN):  benzocaine/menthol Lozenge 1 Lozenge Oral every 3 hours PRN Mouth Sores  cyclobenzaprine 5 milliGRAM(s) Oral three times a day PRN Muscle Spasm  HYDROmorphone   Tablet 2 milliGRAM(s) Oral every 8 hours PRN Severe Pain (7 - 10)      Objective:    Vitals: Vital Signs Last 24 Hrs  T(C): 37.3 (10-29-24 @ 05:00), Max: 37.3 (10-29-24 @ 05:00)  T(F): 99.1 (10-29-24 @ 05:00), Max: 99.1 (10-29-24 @ 05:00)  HR: 95 (10-29-24 @ 05:00) (85 - 98)  BP: 150/74 (10-29-24 @ 05:00) (132/66 - 152/76)  BP(mean): --  RR: 18 (10-29-24 @ 05:00) (18 - 18)  SpO2: 94% (10-29-24 @ 05:00) (94% - 95%)            I&O's Summary    27 Oct 2024 07:01  -  28 Oct 2024 07:00  --------------------------------------------------------  IN: 780 mL / OUT: 0 mL / NET: 780 mL    28 Oct 2024 07:01  -  29 Oct 2024 06:46  --------------------------------------------------------  IN: 540 mL / OUT: 475 mL / NET: 65 mL        PHYSICAL EXAM:  GENERAL: NAD  HEAD:  Atraumatic, Normocephalic  EYES: EOMI, conjunctiva and sclera clear  CHEST/LUNG: Clear to auscultation bilaterally; No rales, rhonchi, wheezing, or rubs. Chest wall pain worse on palpation.   HEART: Regular rate and rhythm; No murmurs, rubs, or gallops  ABDOMEN: Soft, Nontender, Nondistended;   SKIN: No rashes or lesions  NERVOUS SYSTEM:  Alert & Oriented X3, no focal deficits    LABS:  10-28    139  |  104  |  21  ----------------------------<  106[H]  4.3   |  23  |  0.79  10-27    138  |  106  |  20  ----------------------------<  95  4.6   |  18[L]  |  0.64    Ca    9.2      28 Oct 2024 07:08  Ca    8.7      27 Oct 2024 07:24  Phos  3.0     10-28  Mg     1.9     10-28    TPro  6.4  /  Alb  3.4  /  TBili  0.2  /  DBili  x   /  AST  31  /  ALT  29  /  AlkPhos  276[H]  10-28  TPro  6.1  /  Alb  3.3  /  TBili  0.2  /  DBili  x   /  AST  45[H]  /  ALT  34  /  AlkPhos  234[H]  10-27      PTT - ( 28 Oct 2024 07:10 )  PTT:45.7 sec              Urinalysis Basic - ( 28 Oct 2024 07:08 )    Color: x / Appearance: x / SG: x / pH: x  Gluc: 106 mg/dL / Ketone: x  / Bili: x / Urobili: x   Blood: x / Protein: x / Nitrite: x   Leuk Esterase: x / RBC: x / WBC x   Sq Epi: x / Non Sq Epi: x / Bacteria: x                              8.1    7.24  )-----------( 415      ( 29 Oct 2024 06:19 )             27.0                         8.6    8.58  )-----------( 487      ( 28 Oct 2024 07:10 )             28.3                         7.8    8.50  )-----------( 377      ( 27 Oct 2024 07:24 )             26.4     CAPILLARY BLOOD GLUCOSE          RADIOLOGY & ADDITIONAL TESTS:    Imaging Personally Reviewed:  [x ] YES  [ ] NO    Consultants involved in case:   Consultant(s) Notes Reviewed:  [ x] YES  [ ] NO:   Care Discussed with Consultants/Other Providers [x ] YES  [ ] NO         ***************************************************************  Yunior Hanna) [facility] PGY3  Internal Medicine   ***************************************************************    VIRGINIA HUFFMAN  74y  MRN: [id number]    Patient is a 74y old  Female who presents with a chief complaint of Bacteremia (29 Oct 2024 05:17)      Subjective: NAEO. Reporting some continuing back pain and chest wall pain. .    MEDICATIONS  (STANDING):  acetaminophen     Tablet .. 975 milliGRAM(s) Oral every 8 hours  apixaban 5 milliGRAM(s) Oral every 12 hours  atorvastatin 40 milliGRAM(s) Oral at bedtime  ceFAZolin   IVPB 2000 milliGRAM(s) IV Intermittent every 8 hours  DULoxetine 20 milliGRAM(s) Oral daily  ergocalciferol 45655 Unit(s) Oral <User Schedule>  erythromycin    ethylsuccinate Suspension 40 mG/mL 128 milliGRAM(s) Oral every 12 hours  losartan 100 milliGRAM(s) Oral daily  metoprolol tartrate 25 milliGRAM(s) Oral two times a day  montelukast 10 milliGRAM(s) Oral daily  multivitamin 1 Tablet(s) Oral daily  polyethylene glycol 3350 17 Gram(s) Oral two times a day  pramipexole 0.75 milliGRAM(s) Oral <User Schedule>  pregabalin 100 milliGRAM(s) Oral every 8 hours  senna 2 Tablet(s) Oral at bedtime  Vonoprazan (Voquenza) Tablet 10 milliGRAM(s) 1 Tablet(s) Oral daily    MEDICATIONS  (PRN):  benzocaine/menthol Lozenge 1 Lozenge Oral every 3 hours PRN Mouth Sores  cyclobenzaprine 5 milliGRAM(s) Oral three times a day PRN Muscle Spasm  HYDROmorphone   Tablet 2 milliGRAM(s) Oral every 8 hours PRN Severe Pain (7 - 10)      Objective:    Vitals: Vital Signs Last 24 Hrs  T(C): 37.3 (10-29-24 @ 05:00), Max: 37.3 (10-29-24 @ 05:00)  T(F): 99.1 (10-29-24 @ 05:00), Max: 99.1 (10-29-24 @ 05:00)  HR: 95 (10-29-24 @ 05:00) (85 - 98)  BP: 150/74 (10-29-24 @ 05:00) (132/66 - 152/76)  BP(mean): --  RR: 18 (10-29-24 @ 05:00) (18 - 18)  SpO2: 94% (10-29-24 @ 05:00) (94% - 95%)            I&O's Summary    27 Oct 2024 07:01  -  28 Oct 2024 07:00  --------------------------------------------------------  IN: 780 mL / OUT: 0 mL / NET: 780 mL    28 Oct 2024 07:01  -  29 Oct 2024 06:46  --------------------------------------------------------  IN: 540 mL / OUT: 475 mL / NET: 65 mL        PHYSICAL EXAM:  GENERAL: NAD  HEAD:  Atraumatic, Normocephalic  EYES: EOMI, conjunctiva and sclera clear  CHEST/LUNG: Clear to auscultation bilaterally; No rales, rhonchi, wheezing, or rubs. Chest wall pain worse on palpation.   HEART: Regular rate and rhythm; No murmurs, rubs, or gallops  ABDOMEN: Soft, Nontender, Nondistended;   SKIN: No rashes or lesions  NERVOUS SYSTEM:  Alert & Oriented X3, no focal deficits    LABS:  10-28    139  |  104  |  21  ----------------------------<  106[H]  4.3   |  23  |  0.79  10-27    138  |  106  |  20  ----------------------------<  95  4.6   |  18[L]  |  0.64    Ca    9.2      28 Oct 2024 07:08  Ca    8.7      27 Oct 2024 07:24  Phos  3.0     10-28  Mg     1.9     10-28    TPro  6.4  /  Alb  3.4  /  TBili  0.2  /  DBili  x   /  AST  31  /  ALT  29  /  AlkPhos  276[H]  10-28  TPro  6.1  /  Alb  3.3  /  TBili  0.2  /  DBili  x   /  AST  45[H]  /  ALT  34  /  AlkPhos  234[H]  10-27      PTT - ( 28 Oct 2024 07:10 )  PTT:45.7 sec              Urinalysis Basic - ( 28 Oct 2024 07:08 )    Color: x / Appearance: x / SG: x / pH: x  Gluc: 106 mg/dL / Ketone: x  / Bili: x / Urobili: x   Blood: x / Protein: x / Nitrite: x   Leuk Esterase: x / RBC: x / WBC x   Sq Epi: x / Non Sq Epi: x / Bacteria: x                              8.1    7.24  )-----------( 415      ( 29 Oct 2024 06:19 )             27.0                         8.6    8.58  )-----------( 487      ( 28 Oct 2024 07:10 )             28.3                         7.8    8.50  )-----------( 377      ( 27 Oct 2024 07:24 )             26.4     CAPILLARY BLOOD GLUCOSE          RADIOLOGY & ADDITIONAL TESTS:    Imaging Personally Reviewed:  [x ] YES  [ ] NO    Consultants involved in case:   Consultant(s) Notes Reviewed:  [ x] YES  [ ] NO:   Care Discussed with Consultants/Other Providers [x ] YES  [ ] NO

## 2024-10-29 NOTE — PROGRESS NOTE ADULT - ASSESSMENT
Patient is a 74-year-old female patient with past medical history of OA, HTN, gastroparesis, peripheral neuropathy, multiple prior thoracolumbar spine surgeries for congenital scoliosis [place] >[place] w/ [place] of hardware (1991) who presented to [facility] on 9/28/24 with back pain and left hip pain s/p mechanical fall. CT imaging was performed and reported a T12 three column spine fracture-malalignment with fracture extending to the adjacent right 12th posterior rib and left T12-L1 facet joint and acute, displaced fracture of the left eighth lateral rib. Chronic appearing bilateral rib deformities. Surgical management was recommended and she is S/P T9-L3 open fusion with PSO/lag screw partial corpectomy/interbody by Dr. Stroud, Neurosurgeon with complex Plastic Surgery closure by Dr. Elias on 9/29/24. Post op course c/b CSF leak s/p repair, new onset paroxsymal Afib with RVR,  increased pain and drainage from surgical incision, development of pleural effusions, symptomatic orthostatic hypotension, urinary retention, post op anemia, and chest pain with spontaneous resolution. Cardiac work up negative. Patient was evaluated by PM&R and therapy for functional deficits, gait/ADL impairments and acute rehabilitation was recommended. Patient was cleared for discharge to [facility] IRF on 10/16/24.     Rehab course complicated by RRT on 10/20 for fever of 103.9 f, tachycardia, and AMS. Infectious workup following RRT notable for leukocytosis to 10.58, lactic acid 2.6, U/A with positive nitrites, large LE, 25 WBCs, urine culture growing klebsiella aerogenes and e. coli, and 10/20 blood cultures x 2 with MSSA. Repeat blood cultures on 10/21 with NGTD. CT head without acute pathology. CT T and L spine: Unremarkable CT scan of the thoracic spine.   No vertebral fracture is recognized. ID was consulted while at [facility] who recommended Transfer request initiated from [place] to [facility] for MICHAEL and completion of bacteremia workup/treatment.      Patient has had unchanged persistent back pain since surgery. Other than spinal hardware, patient reports she had a right knee replacement this year, two hip replacements (one 2 years ago and one 20 years ago), and right shoulder replacement 10 years ago. States none of these sites have been bothering her. Denies any prosthetic heart valves or murmurs. Clincal exam notable for fluctuance over lower L spine.  MRI imaging performed now showing 28.0 x 7.0 x 3.0 (CC x TV x AP) rim-enhancing heterogeneous fluid collection in the dorsal paraspinal through superficial subcutaneous tissues at the T7-L4 levels. Now s/p IR aspiration on 10/25.     #MSSA bacteremia  #recent spinal surgery with hardware  #presence of other prosthetic joints   #rim enhancing fluid collection in T7-L4 region, s/p drain placement      10/28: Aspiration with MSSA. Beta-2-transferrin pending. Patient doing well overall. F/U Blood cx NTD. For PICC today.   10/29: No concern of uncontrolled infection on exam. The risks, benefits and alternatives to outpatient antibiotics were discussed with the patient in clear layman's terms. The strengths and weaknesses of the various approaches were addressed. It is not possible to review every single potential side effect or scenario. Discussion of adverse events, including but were not limited to the following--  Related the use of a PICC line or other IV catheter: breakage/catheter dysfunction, inadvertent removal, blood clot, infection, irritation related to the dressing. The overall risk of complications related to outpatient IV antibiotic administration resulting in cessation of therapy is reported to be 3-10%. In my personal experience it appears to be 1-3%.  Related to the use of antibiotics in general the following side effects are common: rash, allergy, GI upset, diarrhea/colitis (including C. difficile), kidney dysfunction, liver abnormalities, abnormal blood counts.  Laboratory data will need to be monitored on an ongoing basis. The types of labs monitored will depend on the specific drug selected. Such monitoring typically occurs once weekly, and sometimes more often, as the drug or clinical circumstances dictate.  The patient voiced understanding and agreed. All questions were answered to the best of my ability.    Suggestions--  Continue IV Cefazolin  PFO- as per primary  Await rehab placement  Repeat imaging/drain management as per surgery and IR    Drug: Cefazolin  Dose: 2g  Route: IVPB  Frequency: q8H  Stop date: would treat IV 6 weeks from aspiration i.e. last day of IV abx is 12/12/24.  After IV antibiotics completed would begin Duricef 500mg PO Q12H-- long term and perhaps indefinitely  Labs & Other Monitoring: at rehab weekly CBC Diff BMP ESR CRP. While physicians at rehab will need to be responsible for monitoring these, happy to assist as needed. Labs, orders, and other correspondence should be emailed to [email or web address]  Follow Up: nonurgently in office    Kaushal Waterman MD  Attending Physician  [facility]  Division of Infectious Diseases  [phone]

## 2024-10-29 NOTE — PROGRESS NOTE ADULT - PROBLEM SELECTOR PLAN 12
- DVT prophylaxis: heparin infusion  - Diet: DASH/TLC  - Disposition: Pending course - DVT prophylaxis: heparin infusion  - Diet: DASH/TLC  - Disposition: Arranging DC to [facility], accepted, pending bed.

## 2024-10-29 NOTE — PROGRESS NOTE ADULT - ASSESSMENT
- s/p tap w/ rare staph aureus  - BCx 10/20 MSSA   - blood cx 10/21 neg  - c/w conservative mgmt, PICC line, abx per ID, drainage  - no neurosurgical c/i to d/c to rehab at this time

## 2024-10-29 NOTE — PROGRESS NOTE ADULT - ATTENDING COMMENTS
75 yo F with PMH of HTN, gastroparesis, peripheral neuropathy, multiple prior thoracolumbar spine surgeries for congenital scoliosis presents with back pain s/p mechanical fall from sitting. Found with T12 three column spine fracture-malalignment with fracture extending to the adjacent right 12th posterior rib and left T12-L1 facet joint s/p T9-L3 fusion on 9/29 post-op course c/b recurrent brief Afib discharged to acute rehab where she had RRT 2/2 to sepsis, BCX + MSSA tx to [facility] for further eval      #Sepsis 2/2 to MSSA bacteremia  # Paraspinal collection  #Paroxysmal atrial fibrillation  #HTN  # Depression  # Acute on Chronic back pain      - MRI T/L spine 8.0 x 7.0 x 3.0 (CC x TV x AP) rim-enhancing heterogeneous fluid collection in the dorsal paraspinal through superficial subcutaneous tissues at the T7-L4 levels; 8.0 x 7.0 x 3.0 (CC x TV x AP) rim-enhancing heterogeneous fluid collection in the dorsal paraspinal through   superficial subcutaneous tissues at the T7-L4 levels  -s/p IR guided aspiration of paraspinal fluid collection 10/25 and drainage, cultures with MSSA  - needs PICC line BCX NGTD 10/21, 10/23- will obtain PICC 10/28 and then dc planning to acute rehab  - c/w Ancef 2gm q8hr till 12/12/24 then duricef indefinitely per ID recs, repeat BCX 10/23 NGTD  - likely source of sepsis is MSSA bacteremia is paraspinal collection 4/4 +MSSA 10/20, less likely UTI- UCX with E coli and Klebsiella but patient asymptomatic  - TTE 10/23- no vegetations, MICHAEL no vegetations, PFO+  - NSG c/s appreciated  - also found to be back in a fib- ekg confirmed- tele monitoring- CHADSVASC is 5- transition to apixaban  - c/w Hydromorphone 2mg PO q8hr for severe pain, senna, Miralax for bowel regimen; requiring less pain medication post-drainage  - c/w home losartan, metoprolol to 25mg BID  - dc to acute rehab, picc line placed, dc time 55 min. pt in agreement

## 2024-10-29 NOTE — PROGRESS NOTE ADULT - SUBJECTIVE AND OBJECTIVE BOX
[facility]  Division of Infectious Diseases  [phone]    Name: VIRGINIA HUFFMAN  Age: 74y  Gender: Female  MRN: [id number]    Interval History--  Notes reviewed.     Past Medical History--  H/O seasonal allergies    History of pulmonary embolism    Restless leg syndrome    GERD (gastroesophageal reflux disease)    HTN (hypertension)    OA (osteoarthritis)    Fungal infection of skin    H/O scoliosis    Essential hypertension    Depression    Osteoporosis    Right hip pain    2019 novel coronavirus disease (COVID-19)    Hiatal hernia    History of chronic pain    Peripheral neuropathy    S/P repair of paraesophageal hernia    S/P spinal fusion    Scoliosis    S/P spinal surgery    Removal of pin, plate, abigail, or screw    S/P hysterectomy    S/P hip replacement    S/P shoulder surgery    S/P dilatation and curettage    H/O arthroscopy of knee    H/O total knee replacement, right    S/P cataract surgery    History of bilateral hip replacements        For details regarding the patient's social history, family history, and other miscellaneous elements, please refer the initial infectious diseases consultation and/or the admitting history and physical examination for this admission.    Allergies    Dilantin (Urticaria)  penicillins (Urticaria)    Intolerances    erythromycin (Stomach Upset; Vomiting; Nausea)      Medications--  Antibiotics:  ceFAZolin   IVPB 2000 milliGRAM(s) IV Intermittent every 8 hours  erythromycin    ethylsuccinate Suspension 40 mG/mL 128 milliGRAM(s) Oral every 12 hours    Immunologic:    Other:  acetaminophen     Tablet ..  apixaban  atorvastatin  benzocaine/menthol Lozenge PRN  cyclobenzaprine PRN  DULoxetine  ergocalciferol  HYDROmorphone   Tablet PRN  losartan  metoprolol tartrate  montelukast  multivitamin  polyethylene glycol 3350  pramipexole  pregabalin  senna  Vonoprazan (Voquenza) Tablet 10 milliGRAM(s)      Review of Systems--  A 10-point review of systems was obtained.     Pertinent positives and negatives--  Constitutional: No fevers. No Chills. No Rigors.   Cardiovascular: No chest pain. No palpitations.  Respiratory: No shortness of breath. No cough.  Gastrointestinal: No nausea or vomiting. No diarrhea or constipation.   Psychiatric: Pleasant. Appropriate affect.    Review of systems otherwise negative except as previously noted.    Physical Examination--  Vital Signs: T(F): 99.1 (10-29-24 @ 05:00), Max: 99.1 (10-29-24 @ 05:00)  HR: 95 (10-29-24 @ 05:00)  BP: 150/74 (10-29-24 @ 05:00)  RR: 18 (10-29-24 @ 05:00)  SpO2: 94% (10-29-24 @ 05:00)  Wt(kg): --  General: Nontoxic-appearing Female in no acute distress.  HEENT: AT/NC. PERRL. EOMI. Anicteric. Conjunctiva pink and moist. Oropharynx clear. Dentition fair.  Neck: Not rigid. No sense of mass.  Nodes: None palpable.  Lungs: Clear bilaterally without rales, wheezing or rhonchi  Heart: Regular rate and rhythm. No Murmur. No rub. No gallop. No palpable thrill.  Abdomen: Bowel sounds present and normoactive. Soft. Nondistended. Nontender. No sense of mass. No organomegaly.  Back: No spinal tenderness. No costovertebral angle tenderness.   Extremities: No cyanosis or clubbing. No edema.   Skin: Warm. Dry. Good turgor. No rash. No vasculitic stigmata.  Psychiatric: Appropriate affect and mood for situation.         Laboratory Studies--  CBC                        8.1    7.24  )-----------( 415      ( 29 Oct 2024 06:19 )             27.0       Chemistries  10-28    139  |  104  |  21  ----------------------------<  106[H]  4.3   |  23  |  0.79    Ca    9.2      28 Oct 2024 07:08  Phos  3.0     10-28  Mg     1.9     10-28    TPro  6.4  /  Alb  3.4  /  TBili  0.2  /  DBili  x   /  AST  31  /  ALT  29  /  AlkPhos  276[H]  10-28      Culture Data    Culture - Body Fluid with Gram Stain (collected 25 Oct 2024 13:46)  Source: Body Fluid  Gram Stain (25 Oct 2024 21:03):    polymorphonuclear leukocytes seen    Gram positive cocci in pairs seen    by cytocentrifuge  Preliminary Report (26 Oct 2024 21:01):    Rare Staphylococcus aureus  Organism: Staphylococcus aureus (27 Oct 2024 15:13)  Organism: Staphylococcus aureus (27 Oct 2024 15:13)    Culture - Blood (collected 23 Oct 2024 09:55)  Source: .Blood BLOOD  Final Report (28 Oct 2024 16:00):    No growth at 5 days    Culture - Blood (collected 23 Oct 2024 09:40)  Source: .Blood BLOOD  Final Report (28 Oct 2024 16:00):    No growth at 5 days             [facility]  Division of Infectious Diseases  [phone]    Name: VIRGINIA HUFFMAN  Age: 74y  Gender: Female  MRN: [id number]    Interval History--  Notes reviewed. Feeling ok. S/P PICC. No new issues.     Past Medical History--  H/O seasonal allergies    History of pulmonary embolism    Restless leg syndrome    GERD (gastroesophageal reflux disease)    HTN (hypertension)    OA (osteoarthritis)    Fungal infection of skin    H/O scoliosis    Essential hypertension    Depression    Osteoporosis    Right hip pain    2019 novel coronavirus disease (COVID-19)    Hiatal hernia    History of chronic pain    Peripheral neuropathy    S/P repair of paraesophageal hernia    S/P spinal fusion    Scoliosis    S/P spinal surgery    Removal of pin, plate, abigail, or screw    S/P hysterectomy    S/P hip replacement    S/P shoulder surgery    S/P dilatation and curettage    H/O arthroscopy of knee    H/O total knee replacement, right    S/P cataract surgery    History of bilateral hip replacements        For details regarding the patient's social history, family history, and other miscellaneous elements, please refer the initial infectious diseases consultation and/or the admitting history and physical examination for this admission.    Allergies    Dilantin (Urticaria)  penicillins (Urticaria)    Intolerances    erythromycin (Stomach Upset; Vomiting; Nausea)      Medications--  Antibiotics:  ceFAZolin   IVPB 2000 milliGRAM(s) IV Intermittent every 8 hours  erythromycin    ethylsuccinate Suspension 40 mG/mL 128 milliGRAM(s) Oral every 12 hours    Immunologic:    Other:  acetaminophen     Tablet ..  apixaban  atorvastatin  benzocaine/menthol Lozenge PRN  cyclobenzaprine PRN  DULoxetine  ergocalciferol  HYDROmorphone   Tablet PRN  losartan  metoprolol tartrate  montelukast  multivitamin  polyethylene glycol 3350  pramipexole  pregabalin  senna  Vonoprazan (Voquenza) Tablet 10 milliGRAM(s)      Review of Systems--  A 10-point review of systems was obtained.   Review of systems otherwise negative except as previously noted.    Physical Examination--  Vital Signs: T(F): 99.1 (10-29-24 @ 05:00), Max: 99.1 (10-29-24 @ 05:00)  HR: 95 (10-29-24 @ 05:00)  BP: 150/74 (10-29-24 @ 05:00)  RR: 18 (10-29-24 @ 05:00)  SpO2: 94% (10-29-24 @ 05:00)  Wt(kg): --  General: Nontoxic-appearing Female in no acute distress.  HEENT: AT/NC. Anicteric. Conjunctiva pink and moist. Oropharynx clear.   Neck: Not rigid. No sense of mass.  Nodes: None palpable.  Lungs: Clear bilaterally  Heart: Regular rate and rhythm.   Abdomen: Bowel sounds present and normoactive. Soft. Nondistended. Nontender.  Back: Drain scant yellow-orange turbid fluid  Extremities: No cyanosis or clubbing. No edema. PICC CDI RUE  Skin: Warm. Dry. Good turgor. No rash. No vasculitic stigmata.  Psychiatric: Appropriate affect and mood for situation.       Laboratory Studies--  CBC                        8.1    7.24  )-----------( 415      ( 29 Oct 2024 06:19 )             27.0       Chemistries  10-28    139  |  104  |  21  ----------------------------<  106[H]  4.3   |  23  |  0.79    Ca    9.2      28 Oct 2024 07:08  Phos  3.0     10-28  Mg     1.9     10-28    TPro  6.4  /  Alb  3.4  /  TBili  0.2  /  DBili  x   /  AST  31  /  ALT  29  /  AlkPhos  276[H]  10-28      Culture Data    Culture - Body Fluid with Gram Stain (collected 25 Oct 2024 13:46)  Source: Body Fluid  Gram Stain (25 Oct 2024 21:03):    polymorphonuclear leukocytes seen    Gram positive cocci in pairs seen    by cytocentrifuge  Preliminary Report (26 Oct 2024 21:01):    Rare Staphylococcus aureus  Organism: Staphylococcus aureus (27 Oct 2024 15:13)  Organism: Staphylococcus aureus (27 Oct 2024 15:13)    Culture - Blood (collected 23 Oct 2024 09:55)  Source: .Blood BLOOD  Final Report (28 Oct 2024 16:00):    No growth at 5 days    Culture - Blood (collected 23 Oct 2024 09:40)  Source: .Blood BLOOD  Final Report (28 Oct 2024 16:00):    No growth at 5 days

## 2024-10-30 ENCOUNTER — INPATIENT (INPATIENT)
Facility: HOSPITAL | Age: 74
LOS: 20 days | Discharge: HOME | DRG: 552 | End: 2024-11-20
Attending: PHYSICAL MEDICINE & REHABILITATION | Admitting: PHYSICAL MEDICINE & REHABILITATION
Payer: MEDICARE

## 2024-10-30 ENCOUNTER — TRANSCRIPTION ENCOUNTER (OUTPATIENT)
Age: 74
End: 2024-10-30

## 2024-10-30 VITALS
TEMPERATURE: 98 F | SYSTOLIC BLOOD PRESSURE: 135 MMHG | DIASTOLIC BLOOD PRESSURE: 78 MMHG | HEART RATE: 85 BPM | RESPIRATION RATE: 18 BRPM | OXYGEN SATURATION: 94 %

## 2024-10-30 VITALS
RESPIRATION RATE: 16 BRPM | HEART RATE: 87 BPM | WEIGHT: 204.59 LBS | OXYGEN SATURATION: 95 % | DIASTOLIC BLOOD PRESSURE: 74 MMHG | HEIGHT: 64 IN | TEMPERATURE: 98 F | SYSTOLIC BLOOD PRESSURE: 136 MMHG

## 2024-10-30 DIAGNOSIS — M43.20 FUSION OF SPINE, SITE UNSPECIFIED: ICD-10-CM

## 2024-10-30 DIAGNOSIS — Z96.643 PRESENCE OF ARTIFICIAL HIP JOINT, BILATERAL: Chronic | ICD-10-CM

## 2024-10-30 DIAGNOSIS — Z98.890 OTHER SPECIFIED POSTPROCEDURAL STATES: Chronic | ICD-10-CM

## 2024-10-30 DIAGNOSIS — Z98.49 CATARACT EXTRACTION STATUS, UNSPECIFIED EYE: Chronic | ICD-10-CM

## 2024-10-30 LAB
CULTURE RESULTS: ABNORMAL
HCT VFR BLD CALC: 28.9 % — LOW (ref 34.5–45)
HGB BLD-MCNC: 8.6 G/DL — LOW (ref 11.5–15.5)
MCHC RBC-ENTMCNC: 26.8 PG — LOW (ref 27–34)
MCHC RBC-ENTMCNC: 29.8 G/DL — LOW (ref 32–36)
MCV RBC AUTO: 90 FL — SIGNIFICANT CHANGE UP (ref 80–100)
NRBC # BLD: 0 /100 WBCS — SIGNIFICANT CHANGE UP (ref 0–0)
ORGANISM # SPEC MICROSCOPIC CNT: ABNORMAL
ORGANISM # SPEC MICROSCOPIC CNT: ABNORMAL
PLATELET # BLD AUTO: 465 K/UL — HIGH (ref 150–400)
RBC # BLD: 3.21 M/UL — LOW (ref 3.8–5.2)
RBC # FLD: 16.1 % — HIGH (ref 10.3–14.5)
SARS-COV-2 RNA SPEC QL NAA+PROBE: SIGNIFICANT CHANGE UP
SPECIMEN SOURCE: SIGNIFICANT CHANGE UP
WBC # BLD: 8.2 K/UL — SIGNIFICANT CHANGE UP (ref 3.8–10.5)
WBC # FLD AUTO: 8.2 K/UL — SIGNIFICANT CHANGE UP (ref 3.8–10.5)

## 2024-10-30 PROCEDURE — 99232 SBSQ HOSP IP/OBS MODERATE 35: CPT

## 2024-10-30 PROCEDURE — 99232 SBSQ HOSP IP/OBS MODERATE 35: CPT | Mod: GC

## 2024-10-30 PROCEDURE — 71045 X-RAY EXAM CHEST 1 VIEW: CPT | Mod: 26

## 2024-10-30 RX ORDER — METOPROLOL TARTRATE 50 MG
1 TABLET ORAL
Qty: 0 | Refills: 0 | DISCHARGE
Start: 2024-10-30

## 2024-10-30 RX ORDER — ACETAMINOPHEN 500 MG
3 TABLET ORAL
Qty: 0 | Refills: 0 | DISCHARGE
Start: 2024-10-30

## 2024-10-30 RX ORDER — HYDROMORPHONE HCL/0.9% NACL/PF 6 MG/30 ML
4 PATIENT CONTROLLED ANALGESIA SYRINGE INTRAVENOUS EVERY 8 HOURS
Refills: 0 | Status: DISCONTINUED | OUTPATIENT
Start: 2024-10-30 | End: 2024-10-30

## 2024-10-30 RX ORDER — HYDROMORPHONE HCL/0.9% NACL/PF 6 MG/30 ML
1 PATIENT CONTROLLED ANALGESIA SYRINGE INTRAVENOUS
Qty: 0 | Refills: 0 | DISCHARGE
Start: 2024-10-30

## 2024-10-30 RX ORDER — MONTELUKAST SODIUM 10 MG/1
10 TABLET ORAL DAILY
Refills: 0 | Status: DISCONTINUED | OUTPATIENT
Start: 2024-10-31 | End: 2024-11-20

## 2024-10-30 RX ORDER — METOPROLOL TARTRATE 100 MG/1
25 TABLET, FILM COATED ORAL
Refills: 0 | Status: DISCONTINUED | OUTPATIENT
Start: 2024-10-30 | End: 2024-10-31

## 2024-10-30 RX ORDER — CELECOXIB 100 MG
1 CAPSULE ORAL
Refills: 0 | DISCHARGE

## 2024-10-30 RX ORDER — ACETAMINOPHEN 500MG 500 MG/1
975 TABLET, COATED ORAL EVERY 8 HOURS
Refills: 0 | Status: DISCONTINUED | OUTPATIENT
Start: 2024-10-30 | End: 2024-11-20

## 2024-10-30 RX ORDER — ACETAMINOPHEN, DIPHENHYDRAMINE HCL, PHENYLEPHRINE HCL 325; 25; 5 MG/1; MG/1; MG/1
6 TABLET ORAL AT BEDTIME
Refills: 0 | Status: DISCONTINUED | OUTPATIENT
Start: 2024-10-30 | End: 2024-11-20

## 2024-10-30 RX ORDER — PREGABALIN 75 MG/1
100 CAPSULE ORAL EVERY 8 HOURS
Refills: 0 | Status: DISCONTINUED | OUTPATIENT
Start: 2024-10-30 | End: 2024-11-06

## 2024-10-30 RX ORDER — MELATONIN 5 MG
1 TABLET ORAL
Qty: 0 | Refills: 0 | DISCHARGE
Start: 2024-10-30

## 2024-10-30 RX ORDER — BENZOCAINE, MENTHOL 15; 3.6 MG/1; MG/1
1 LOZENGE ORAL
Refills: 0 | Status: DISCONTINUED | OUTPATIENT
Start: 2024-10-30 | End: 2024-11-20

## 2024-10-30 RX ORDER — LOSARTAN POTASSIUM 25 MG/1
1 TABLET ORAL
Qty: 0 | Refills: 0 | DISCHARGE
Start: 2024-10-30

## 2024-10-30 RX ORDER — DULOXETINE HCL 60 MG
20 CAPSULE,DELAYED RELEASE (ENTERIC COATED) ORAL DAILY
Refills: 0 | Status: DISCONTINUED | OUTPATIENT
Start: 2024-10-31 | End: 2024-11-20

## 2024-10-30 RX ORDER — CEFAZOLIN SODIUM 1 G
2 VIAL (EA) INJECTION
Qty: 0 | Refills: 0 | DISCHARGE
Start: 2024-10-30

## 2024-10-30 RX ORDER — CHLORTHALIDONE 25 MG
1 TABLET ORAL
Refills: 0 | DISCHARGE

## 2024-10-30 RX ORDER — CYCLOBENZAPRINE HYDROCHLORIDE 30 MG/1
10 CAPSULE, EXTENDED RELEASE ORAL THREE TIMES A DAY
Refills: 0 | Status: DISCONTINUED | OUTPATIENT
Start: 2024-10-30 | End: 2024-10-30

## 2024-10-30 RX ORDER — PRAMIPEXOLE DIHYDROCHLORIDE 0.12 MG/1
1 TABLET ORAL
Qty: 0 | Refills: 0 | DISCHARGE
Start: 2024-10-30

## 2024-10-30 RX ORDER — B-COMPLEX WITH VITAMIN C
1 VIAL (ML) INJECTION
Qty: 0 | Refills: 0 | DISCHARGE
Start: 2024-10-30

## 2024-10-30 RX ORDER — POLYETHYLENE GLYCOL 3350 17 G/17G
17 POWDER, FOR SOLUTION ORAL
Refills: 0 | Status: DISCONTINUED | OUTPATIENT
Start: 2024-10-30 | End: 2024-11-12

## 2024-10-30 RX ORDER — LOSARTAN POTASSIUM 100 MG/1
100 TABLET, FILM COATED ORAL DAILY
Refills: 0 | Status: DISCONTINUED | OUTPATIENT
Start: 2024-10-31 | End: 2024-11-01

## 2024-10-30 RX ORDER — LOSARTAN POTASSIUM 25 MG/1
1 TABLET ORAL
Refills: 0 | DISCHARGE

## 2024-10-30 RX ORDER — MELATONIN 5 MG
5 TABLET ORAL AT BEDTIME
Refills: 0 | Status: DISCONTINUED | OUTPATIENT
Start: 2024-10-30 | End: 2024-10-30

## 2024-10-30 RX ORDER — SENNA 187 MG
2 TABLET ORAL
Qty: 0 | Refills: 0 | DISCHARGE
Start: 2024-10-30

## 2024-10-30 RX ORDER — CYCLOBENZAPRINE HCL 10 MG
10 TABLET ORAL THREE TIMES A DAY
Refills: 0 | Status: DISCONTINUED | OUTPATIENT
Start: 2024-10-30 | End: 2024-11-20

## 2024-10-30 RX ORDER — HYDROMORPHONE HYDROCHLORIDE 2 MG/1
4 TABLET ORAL EVERY 8 HOURS
Refills: 0 | Status: DISCONTINUED | OUTPATIENT
Start: 2024-10-30 | End: 2024-11-06

## 2024-10-30 RX ORDER — PRAMIPEXOLE 1.5 MG/1
0.75 TABLET, EXTENDED RELEASE ORAL
Refills: 0 | Status: DISCONTINUED | OUTPATIENT
Start: 2024-10-30 | End: 2024-11-03

## 2024-10-30 RX ORDER — ERGOCALCIFEROL (VITAMIN D2) 200 MCG/ML
50000 DROPS ORAL
Refills: 0 | Status: DISCONTINUED | OUTPATIENT
Start: 2024-10-30 | End: 2024-11-20

## 2024-10-30 RX ORDER — POLYETHYLENE GLYCOL 3350 17 G/17G
17 POWDER, FOR SOLUTION ORAL
Qty: 0 | Refills: 0 | DISCHARGE
Start: 2024-10-30

## 2024-10-30 RX ORDER — APIXABAN 2.5 MG/1
5 TABLET, FILM COATED ORAL EVERY 12 HOURS
Refills: 0 | Status: DISCONTINUED | OUTPATIENT
Start: 2024-10-30 | End: 2024-11-20

## 2024-10-30 RX ORDER — DULOXETINE HYDROCHLORIDE 30 MG/1
1 CAPSULE, DELAYED RELEASE ORAL
Qty: 0 | Refills: 0 | DISCHARGE
Start: 2024-10-30

## 2024-10-30 RX ORDER — VONOPRAZAN FUMARATE 26.72 MG/1
0 TABLET ORAL
Qty: 0 | Refills: 0 | DISCHARGE
Start: 2024-10-30

## 2024-10-30 RX ORDER — SENNOSIDES 8.6 MG
2 TABLET ORAL AT BEDTIME
Refills: 0 | Status: DISCONTINUED | OUTPATIENT
Start: 2024-10-30 | End: 2024-11-12

## 2024-10-30 RX ORDER — LOSARTAN POTASSIUM 100 MG/1
100 TABLET, FILM COATED ORAL DAILY
Refills: 0 | Status: DISCONTINUED | OUTPATIENT
Start: 2024-10-30 | End: 2024-10-30

## 2024-10-30 RX ORDER — MONTELUKAST SODIUM 10 MG/1
1 TABLET, FILM COATED ORAL
Qty: 0 | Refills: 0 | DISCHARGE
Start: 2024-10-30

## 2024-10-30 RX ORDER — CYCLOBENZAPRINE HYDROCHLORIDE 30 MG/1
1 CAPSULE, EXTENDED RELEASE ORAL
Qty: 0 | Refills: 0 | DISCHARGE
Start: 2024-10-30

## 2024-10-30 RX ORDER — CYANOCOBALAMIN/FOLIC AC/VIT B6 1-2.2-25MG
1 TABLET ORAL DAILY
Refills: 0 | Status: DISCONTINUED | OUTPATIENT
Start: 2024-10-31 | End: 2024-11-20

## 2024-10-30 RX ORDER — CEFAZOLIN SODIUM 10 G
2000 VIAL (EA) INJECTION EVERY 8 HOURS
Refills: 0 | Status: DISCONTINUED | OUTPATIENT
Start: 2024-10-30 | End: 2024-11-20

## 2024-10-30 RX ORDER — MELATONIN 5 MG
6 TABLET ORAL AT BEDTIME
Refills: 0 | Status: DISCONTINUED | OUTPATIENT
Start: 2024-10-30 | End: 2024-10-30

## 2024-10-30 RX ORDER — APIXABAN 5 MG/1
1 TABLET, FILM COATED ORAL
Qty: 0 | Refills: 0 | DISCHARGE
Start: 2024-10-30

## 2024-10-30 RX ORDER — PREGABALIN 150 MG/1
1 CAPSULE ORAL
Qty: 0 | Refills: 0 | DISCHARGE
Start: 2024-10-30

## 2024-10-30 RX ORDER — ERYTHROMYCIN STEARATE 250 MG
128 TABLET ORAL
Qty: 0 | Refills: 0 | DISCHARGE
Start: 2024-10-30

## 2024-10-30 RX ORDER — CEFADROXIL 250 MG/5ML
1 POWDER, FOR SUSPENSION ORAL
Qty: 0 | Refills: 0 | DISCHARGE

## 2024-10-30 RX ADMIN — PRAMIPEXOLE 0.75 MILLIGRAM(S): 1.5 TABLET, EXTENDED RELEASE ORAL at 19:43

## 2024-10-30 RX ADMIN — Medication 975 MILLIGRAM(S): at 06:40

## 2024-10-30 RX ADMIN — CYCLOBENZAPRINE HYDROCHLORIDE 10 MILLIGRAM(S): 30 CAPSULE, EXTENDED RELEASE ORAL at 08:51

## 2024-10-30 RX ADMIN — Medication 4 MILLIGRAM(S): at 12:10

## 2024-10-30 RX ADMIN — LOSARTAN POTASSIUM 100 MILLIGRAM(S): 25 TABLET ORAL at 05:33

## 2024-10-30 RX ADMIN — Medication 128 MILLIGRAM(S): at 05:39

## 2024-10-30 RX ADMIN — Medication 2 TABLET(S): at 21:36

## 2024-10-30 RX ADMIN — Medication 1 TABLET(S): at 11:18

## 2024-10-30 RX ADMIN — HYDROMORPHONE HYDROCHLORIDE 4 MILLIGRAM(S): 2 TABLET ORAL at 18:31

## 2024-10-30 RX ADMIN — APIXABAN 5 MILLIGRAM(S): 5 TABLET, FILM COATED ORAL at 05:34

## 2024-10-30 RX ADMIN — ACETAMINOPHEN 500MG 975 MILLIGRAM(S): 500 TABLET, COATED ORAL at 21:36

## 2024-10-30 RX ADMIN — Medication 2 MILLIGRAM(S): at 00:38

## 2024-10-30 RX ADMIN — MONTELUKAST SODIUM 10 MILLIGRAM(S): 10 TABLET, FILM COATED ORAL at 11:18

## 2024-10-30 RX ADMIN — METOPROLOL TARTRATE 25 MILLIGRAM(S): 100 TABLET, FILM COATED ORAL at 18:32

## 2024-10-30 RX ADMIN — POLYETHYLENE GLYCOL 3350 17 GRAM(S): 17 POWDER, FOR SOLUTION ORAL at 18:34

## 2024-10-30 RX ADMIN — Medication 10 MILLIGRAM(S): at 21:35

## 2024-10-30 RX ADMIN — APIXABAN 5 MILLIGRAM(S): 2.5 TABLET, FILM COATED ORAL at 18:32

## 2024-10-30 RX ADMIN — Medication 100 MILLIGRAM(S): at 21:41

## 2024-10-30 RX ADMIN — Medication 10 MILLIGRAM(S): at 18:31

## 2024-10-30 RX ADMIN — Medication 100 MILLIGRAM(S): at 05:37

## 2024-10-30 RX ADMIN — Medication 25 MILLIGRAM(S): at 05:33

## 2024-10-30 RX ADMIN — PREGABALIN 100 MILLIGRAM(S): 75 CAPSULE ORAL at 21:36

## 2024-10-30 RX ADMIN — PREGABALIN 100 MILLIGRAM(S): 150 CAPSULE ORAL at 05:33

## 2024-10-30 RX ADMIN — DULOXETINE HYDROCHLORIDE 20 MILLIGRAM(S): 30 CAPSULE, DELAYED RELEASE ORAL at 11:17

## 2024-10-30 RX ADMIN — Medication 4 MILLIGRAM(S): at 11:21

## 2024-10-30 RX ADMIN — Medication 40 MILLIGRAM(S): at 21:36

## 2024-10-30 RX ADMIN — Medication 975 MILLIGRAM(S): at 05:32

## 2024-10-30 NOTE — PROGRESS NOTE ADULT - PROBLEM SELECTOR PROBLEM 10
Elevated serum alkaline phosphatase level

## 2024-10-30 NOTE — PROGRESS NOTE ADULT - PROBLEM SELECTOR PROBLEM 5
Postoperative anemia

## 2024-10-30 NOTE — PROGRESS NOTE ADULT - PROBLEM SELECTOR PLAN 8
- c/w montelukast 10mg PO

## 2024-10-30 NOTE — PROGRESS NOTE ADULT - PROBLEM SELECTOR PLAN 6
- Transient desaturations on RA    - 10/23 94% on RA    Plan  - c/w Nocturnal 2L O2 and PRN during day during naps, monitor sats   - F/u with PCP outpatient as may benefit from sleep study and CPAP.

## 2024-10-30 NOTE — PROGRESS NOTE ADULT - PROBLEM SELECTOR PLAN 9
-10/10 CT Abd/pelvis- There is a large cyst, stable since prior exam. There are small hypodense foci on segment II and segment I too small to characterize, unchanged since prior.    Plan  -Outpatient f/u
hair removal not indicated
-10/10 CT Abd/pelvis- There is a large cyst, stable since prior exam. There are small hypodense foci on segment II and segment I too small to characterize, unchanged since prior.    Plan  -Outpatient f/u

## 2024-10-30 NOTE — PROGRESS NOTE ADULT - SUBJECTIVE AND OBJECTIVE BOX
Patient seen and examined at bedside.    --Anticoagulation--  apixaban 5 milliGRAM(s) Oral every 12 hours    T(C): 37 (10-30-24 @ 04:12), Max: 37.5 (10-29-24 @ 11:16)  HR: 95 (10-30-24 @ 04:12) (85 - 110)  BP: 148/75 (10-30-24 @ 04:12) (100/62 - 148/75)  RR: 18 (10-30-24 @ 04:12) (18 - 18)  SpO2: 92% (10-30-24 @ 04:12) (92% - 95%)  Wt(kg): --    Exam:  AOx3, BUE 5/5, BLE 5/5, incision c/d/i

## 2024-10-30 NOTE — PROGRESS NOTE ADULT - PROBLEM SELECTOR PLAN 12
- DVT prophylaxis: heparin infusion  - Diet: DASH/TLC  - Disposition: Arranging DC to [facility], accepted, pending bed. - DVT prophylaxis: heparin infusion  - Diet: DASH/TLC  - Disposition: Arranging DC to [facility], accepted, to be discharged.

## 2024-10-30 NOTE — H&P ADULT - NSHPLABSRESULTS_GEN_ALL_CORE
Labs             8.6    8.20  )-----------( 465      ( 30 Oct 2024 06:32 )             28.9    Complete Blood Count Repeat Every 24 Hours X 3 Days (10.29.24 @ 06:19)   Nucleated RBC: 1 /100 WBCs  WBC Count: 7.24 K/uL  RBC Count: 3.04 M/uL  Hemoglobin: 8.1 g/dL  Hematocrit: 27.0 %  Mean Cell Volume: 88.8 fl  Mean Cell Hemoglobin: 26.6 pg  Mean Cell Hemoglobin Conc: 30.0 gm/dL  Red Cell Distrib Width: 16.0 %  Platelet Count - Automated: 415 K/uL    Activated Partial Thromboplastin Time (10.28.24 @ 07:10)   Activated Partial Thromboplastin Time: 45.7    CXR 10/28/24 @ 14:46  IMPRESSION:  Right PICC line terminating in the SVC.  Small left pleural effusion.    MR thoracic/lumbar spine w/wo IV contr 10/23/24 @ 23:31   IMPRESSION:  1. Approximate 28.0 x 7.0 x 3.0 (CC x TV x AP) rim-enhancing   heterogeneous fluid collection in the dorsal paraspinal through   superficial subcutaneous tissues at the T7-L4 levels, inclusive,   encompassing the left dorsal paraspinal hardware centered at the T9   level. While diagnostic considerations include seroma as well as   hematoma, superimposed infection (abscess) may have an indistinguishable   appearance. Consider fluid sampling.  2. No evidence of thoracolumbar discitis-osteomyelitis. No gross epidural   abscess within the limitations of this exam.  3. Additional findings, including those postsurgical, described in detail   above.

## 2024-10-30 NOTE — PROGRESS NOTE ADULT - PROBLEM SELECTOR PROBLEM 7
Restless legs syndrome (RLS)

## 2024-10-30 NOTE — H&P ADULT - ASSESSMENT
Assessment/Plan:  VIRGINIA HUFFMAN is a 75 y/o F with PMHx of OA, HTN, gastroparesis, peripheral neuropathy, multiple prior thoracolumbar spine surgeries for congenital scoliosis [place] >[place] w/ removal of hardware (1991) and recent fall with T11-T12 fracture, s/p thoracolumbar posterior fusion for t spine fx with plastics closure on  9/29 with Dr. Stroud and Dr. Funk, c/b post op csf leak, new onset paroxysmal Afib with RVR, increased post op pain and drainage from incision site, pleural effusions, symptomatic orthostatic hypotension, urinary retention, post op anemia, and CP with spontaneous resolution. Patient was sent to [facility] rehab on 10/17, c/b AMS, sepsis with high fever, rigors, tachycardia on 10/20 found to have MSSA bacteremia with right upper lobe infiltrate, and UTI. CT spine no report of collection. Urine +ve E coli, transferred back to [facility] on 10/23 for MICHAEL, advanced spine imaging, and further management, found to have paraspinal abscess on MR imaging, S/p paraspinal collection aspiration on 10/25, culture growing gram positive cocci in pairs, with rare staph aureus. Neurosurgery advised no acute surgical revision necessary, repeat BCx negative. To continue long-term IV cefazolin via PICC until 12/12/24 (6 wks) with eventual transition to PO Duricef long term/indefinitely as per ID. MICHAEL was negative for valvular vegetations but did reveal incidental PFO. Hospital course significant for recurrent fevers, A fib (resolved) with source control and electrolyte correction, initiated on DOAc given elevated [id number]. Now admitted to [facility] after for initiation of a multidisciplinary rehab program consisting focused on functional mobility, transfers and ADLs.    #Sepsis due to methicillin susceptible Staphylococcus aureus.   - 10/20 Bcx MSSA, 10/21 no growth at 24hrs  - CXR with old rib fracture and small RUL infiltrate  - 10/20 +UA for Ecoli, follow urine culture  - lactic acidosis resolved, lactate 1.6 10/23  - 10/23 Na 134 K 3.3 Phos 1.5  - MRI: rim-enhancing heterogeneous fluid collection suspicious for an abscess; culture growing GPC in pairs and    rare staph aureus   - TTE: no evidence of vegetations  - No Acute surgical intervention per neurosurgery   - S/p paraspinal collection aspiration with paraspinal abscess drain placed with Dr. Guerra 10/25  - Repeat daily Bcx- negative  - Monitor WBC and fever curve  - S/p PICC insertion 10/28  - Cefazolin 2000mg IV q8 until 12/12/24 via PICC- until 12/12/24-> Start PO Duricef 500mg BID on 12/13/24 - long term/indefinitely   - Weekly CBC Diff BMP ESR CRP; emailed to [email or web address]       Comprehensive Multidisciplinary Rehab Program:  - Start comprehensive rehab program, 3 hours a day, 5 days a week.  - PT 2hr/day: Focused on improving strength, endurance, coordination, balance, functional mobility, and transfers  - OT 1hr/day: Focused on improving strength, fine motor skills, coordination, posture and ADLs.    - Activity Limitations: Decreased social, vocational and leisure activities, decreased self care and ADLs, decreased mobility, decreased ability to manage household and finances.     -----------------------------------------------------------------------------------  Concurrent Medical Problems    #Paroxysmal atrial fibrillation.   - 10/25 ECG and exam notable for irregular rate and rhythm  - Metoprolol to 25 BID for rate control  - S/p heparin gtt 3500mg IV q6 for anticoagulation    #Acute on chronic low back pain.   - S/P T9-L3 open fusion with PSO/lag screw partial corpectomy/interbody with complex plastic closure (w/ Dr. Stroud and Dr. Tinajero on 9/29  - TLSO when OOB  - Pain management: Tylenol 975mg ATC TID,  Duloxetine 20mg daily, Lyrica 100mg TID , Flexeril 5mg TID standing,   - Start hydromorphone 2 mg PO q8h PRN  - Eliquis 5mg BID    #HTN  #Orthostatic Hypotension  #HLD  - 10/1 TTE: EF 61%  - S/P 1L IVF bolus (10/13) for symptomatic hypotension with good effect  - Metoprolol to 25 BID  - Atorvastatin 40mg HS  - Losartan 100 mg daily  - Chlorthalidone 25 mg daily on hold  (restart if SBP is consistently above 140 per cardio)  - HOLD midodrine 7.5 mg     # Postoperative anemia  - S/p PRBCs intraoperatively for T spine fusion   - Hgb stable with no evidence of active bleed at this time   - 10/30- Hgb 8.6/ Hct 28.9   - Monitor H/H; transfuse for Hgb <7    #SILVANA (obstructive sleep apnea).   - Transient desaturations on RA    - Nocturnal 2L O2 and PRN during day during naps, monitor sats   - F/u with PCP outpatient as may benefit from sleep study and CPAP.    #Restless legs syndrome (RLS).   - Pramipexole 1mg PO qd and 0.75mg 5 tabs at bedtime.    #Seasonal allergies.   - Montelukast 10mg daily    #Liver cyst.  -10/10 CT Abd/pelvis- There is a large cyst, stable since prior exam. There are small hypodense foci on segment II and segment I too small to characterize, unchanged since prior  -F/u outpatient     #Elevated serum alkaline phosphatase level.   - Hi serum alk phos levels 247 --> 228 --> 172 --> 233 --> 239--> 240--> 247--> 234---> 276 (10/28)  - Monitor serum alk phos levels.    #Gastritis.   - Vonoprazan 10mg daily and erythromycin 128mg oral fluid BID    #Mood/Cognition:  Neuropsychology consult PRN    #Sleep:   Maintain quiet hours and low stim environment.  Melatonin 6mg HS PRN to maximize participation in therapy during the day.     #GI/Bowel:  Senna QHS, Miralax PRN Daily    #/Bladder:   - PVR q 8 hours (SC if > 400)    #Skin/Pressure Injury:   - Skin assessment on admission: ***    #Diet/Supplement  Current Diet: Regular- Dash diet   -Ergocalciferol 82336W weekly   -Multivitamin daily     #Precautions / PROPHYLAXIS:   - Falls,  Spinal  - ortho: Weight bearing status: WBAT, TLSO brace OOB  - Lungs: Aspiration, Incentive Spirometer   - DVT ppx: SCDs, TEDs, Eliquis 5mg BID  - Pressure injury/Skin:  OOB to Chair, PT/OT      ---------------  Code Status: FULL  Emergency Contact:    Outpatient Follow-up (Specialty/Name of physician):    Fran Carr  Internal Medicine  [address] [facility], [address] [address]  Phone: [phone]  Fax: [phone]  Follow Up Time:     Kaushal Waterman  Infectious Disease  [address] [place] [address]  Phone: [phone]  Follow Up Time:  [facility] Neurosurgery  Neurosurgery  Referral Assistance Program    Guille Lance  [facility]  ORTHOSURG [address]  Scheduled Appointment: 11/22/2024    Catalino Cotto  [facility]  WEIGHTMGMT  [facility]  Scheduled Appointment: 12/05/2024    Haydee Bernstein  [facility]  GASTRO [address]  Scheduled Appointment: 01/06/2025      [facility] - Infectious Disease  Infectious Disease  [address] [facility], [place] [address]  Phone: [phone]        --------------  Goals: Safe discharge to Home*****  Estimated Length of Stay: 10-14 days  Rehab Potential: Good  Medical Prognosis: Good  Estimated Disposition: Home with Home Care  ---------------    PRESCREEN COMPARISON:  I have reviewed the prescreen information and I have found no relevant changes between the preadmission screening and my post admission evaluation.    RATIONALE FOR INPATIENT ADMISSION: Patient demonstrates the following:  [X] Medically appropriate for rehabilitation admission  [X] Has attainable rehab goals with an appropriate initial discharge plan  [X]Has rehabilitation potential (expected to make a significant improvement within a reasonable period of time)  [X] Requires close medical management by a rehab physician, rehab nursing care, Hospitalist and comprehensive interdisciplinary team (including PT, OT and/or SLP, Prosthetics and Orthotics)       Assessment/Plan:  VIRGINIA HUFFMAN is a 75 y/o F with PMHx of OA, HTN, gastroparesis, peripheral neuropathy, multiple prior thoracolumbar spine surgeries for congenital scoliosis [place] >[place] w/ removal of hardware (1991) and recent fall with T11-T12 fracture, s/p thoracolumbar posterior fusion for t spine fx with plastics closure on  9/29 with Dr. Stroud and Dr. Funk, c/b post op csf leak, new onset paroxysmal Afib with RVR, increased post op pain and drainage from incision site, pleural effusions, symptomatic orthostatic hypotension, urinary retention, post op anemia, and CP with spontaneous resolution. Patient was sent to [facility] rehab on 10/17, c/b AMS, sepsis with high fever, rigors, tachycardia on 10/20 found to have MSSA bacteremia with right upper lobe infiltrate, and UTI. CT spine no report of collection. Urine +ve E coli, transferred back to [facility] on 10/23 for MICHAEL, advanced spine imaging, and further management, found to have paraspinal abscess on MR imaging, S/p paraspinal collection aspiration on 10/25, culture growing gram positive cocci in pairs, with rare staph aureus. Neurosurgery advised no acute surgical revision necessary, repeat BCx negative. To continue long-term IV cefazolin via PICC until 12/12/24 (6 wks) with eventual transition to PO Duricef long term/indefinitely as per ID. MICHAEL was negative for valvular vegetations but did reveal incidental PFO. Hospital course significant for recurrent fevers, A fib (resolved) with source control and electrolyte correction, initiated on DOAc given elevated [id number]. Now admitted to [facility] after for initiation of a multidisciplinary rehab program consisting focused on functional mobility, transfers and ADLs.    #Sepsis due to methicillin susceptible Staphylococcus aureus.   - 10/20 Bcx MSSA, 10/21 no growth at 24hrs  - CXR with old rib fracture and small RUL infiltrate  - 10/20 +UA for Ecoli, follow urine culture  - lactic acidosis resolved, lactate 1.6 10/23  - 10/23 Na 134 K 3.3 Phos 1.5  - MRI: rim-enhancing heterogeneous fluid collection suspicious for an abscess; culture growing GPC in pairs and    rare staph aureus   - TTE: no evidence of vegetations  - No Acute surgical intervention per neurosurgery   - S/p paraspinal collection aspiration with paraspinal abscess drain placed with Dr. Guerra 10/25  - Repeat daily Bcx- negative  - Monitor WBC and fever curve  - S/p PICC insertion 10/28  - Cefazolin 2000mg IV q8 until 12/12/24 via PICC- until 12/12/24-> Start PO Duricef 500mg BID on 12/13/24 - long term/indefinitely   - Weekly CBC Diff BMP ESR CRP; emailed to [email or web address]       Comprehensive Multidisciplinary Rehab Program:  - Start comprehensive rehab program, 3 hours a day, 5 days a week.  - PT 2hr/day: Focused on improving strength, endurance, coordination, balance, functional mobility, and transfers  - OT 1hr/day: Focused on improving strength, fine motor skills, coordination, posture and ADLs.    - Activity Limitations: Decreased social, vocational and leisure activities, decreased self care and ADLs, decreased mobility, decreased ability to manage household and finances.     -----------------------------------------------------------------------------------  Concurrent Medical Problems    #Paroxysmal atrial fibrillation.   - 10/25 ECG and exam notable for irregular rate and rhythm  - Metoprolol to 25 BID for rate control  - S/p heparin gtt 3500mg IV q6 for anticoagulation  - EKG on admission for baseline    #Acute on chronic low back pain.   - S/P T9-L3 open fusion with PSO/lag screw partial corpectomy/interbody with complex plastic closure (w/ Dr. Stroud and Dr. Tinajero on 9/29  - TLSO when OOB  - Pain management: Tylenol 975mg ATC TID,  Duloxetine 20mg daily, Lyrica 100mg TID , Flexeril 10mg TID standing,   - Start hydromorphone 4 mg PO q8h PRN  - Eliquis 5mg BID    #HTN  #Orthostatic Hypotension  #HLD  - 10/1 TTE: EF 61%  - S/P 1L IVF bolus (10/13) for symptomatic hypotension with good effect  - Metoprolol to 25 BID  - Atorvastatin 40mg HS  - Losartan 100 mg daily  - Chlorthalidone 25 mg daily on hold  (restart if SBP is consistently above 140 per cardio)  - Midodrine 7.5 mg on hold    # Postoperative anemia  - S/p PRBCs intraoperatively for T spine fusion   - Hgb stable with no evidence of active bleed at this time   - 10/30- Hgb 8.6/ Hct 28.9   - Monitor H/H; transfuse for Hgb <7    #SILVANA (obstructive sleep apnea).   - Transient desaturations on RA    - Nocturnal 2L O2 and PRN during day during naps, monitor sats   - F/u with PCP outpatient as may benefit from sleep study and CPAP.    #Restless legs syndrome (RLS).   - Pramipexole 0.75mg daily     #Seasonal allergies.   - Montelukast 10mg daily    #Liver cyst.  -10/10 CT Abd/pelvis- There is a large cyst, stable since prior exam. There are small hypodense foci on segment II and segment I too small to characterize, unchanged since prior  -F/u outpatient     #Elevated serum alkaline phosphatase level.   - Hi serum alk phos levels 247 --> 228 --> 172 --> 233 --> 239--> 240--> 247--> 234---> 276 (10/28)  - Monitor serum alk phos levels.    #Gastritis.   - Vonoprazan 10mg daily and erythromycin 128mg oral fluid BID    #Mood/Cognition:  Neuropsychology consult PRN    #Sleep:   Maintain quiet hours and low stim environment.  Melatonin 6mg HS PRN to maximize participation in therapy during the day.     #GI/Bowel:  Senna QHS, Miralax PRN Daily    #/Bladder:   - PVR q 8 hours (SC if > 400)    #Skin/Pressure Injury:   - Skin assessment on admission: ***    #Diet/Supplement  Current Diet: Regular- Dash diet   -Ergocalciferol [id number] weekly   -Multivitamin daily     #Precautions / PROPHYLAXIS:   - Falls,  Spinal  - ortho: Weight bearing status: WBAT, TLSO brace OOB  - Lungs: Incentive Spirometer   - DVT ppx: SCDs, TEDs, Eliquis 5mg BID  - Pressure injury/Skin:  OOB to Chair, PT/OT      ---------------  Code Status: FULL  Emergency Contact:    Outpatient Follow-up (Specialty/Name of physician):    Fran Carr  Internal Medicine  [address] [facility], [address] [address]  Phone: [phone]  Fax: [phone]  Follow Up Time:     Kaushal Waterman  Infectious Disease  [address] [place] [address]  Phone: [phone]  Follow Up Time:  [facility] Neurosurgery  Neurosurgery  Referral Assistance Program    Guille Lance  [facility]  ORTHOSURG [address]  Scheduled Appointment: 11/22/2024    Catalino Cotto  [facility]  WEIGHTMGMT  [facility]  Scheduled Appointment: 12/05/2024    Haydee Bernstein  [facility]  GASTRO [address]  Scheduled Appointment: 01/06/2025      [facility] - Infectious Disease  Infectious Disease  [address] [facility], [place] [address]  Phone: [phone]        --------------  Goals: Safe discharge to Home*****  Estimated Length of Stay: 10-14 days  Rehab Potential: Good  Medical Prognosis: Good  Estimated Disposition: Home with Home Care  ---------------    PRESCREEN COMPARISON:  I have reviewed the prescreen information and I have found no relevant changes between the preadmission screening and my post admission evaluation.    RATIONALE FOR INPATIENT ADMISSION: Patient demonstrates the following:  [X] Medically appropriate for rehabilitation admission  [X] Has attainable rehab goals with an appropriate initial discharge plan  [X]Has rehabilitation potential (expected to make a significant improvement within a reasonable period of time)  [X] Requires close medical management by a rehab physician, rehab nursing care, Hospitalist and comprehensive interdisciplinary team (including PT, OT, Prosthetics and Orthotics)       Assessment/Plan:  VIRGINIA HUFFMAN is a 75 y/o F with PMHx of OA, HTN, gastroparesis, peripheral neuropathy, multiple prior thoracolumbar spine surgeries for congenital scoliosis [place]/ [place] of hardware (1991) and recent fall with T11-T12 fracture, s/p thoracolumbar posterior fusion for t spine fx with plastics closure on  9/29 with Dr. Stroud and Dr. Funk, c/b post op csf leak, new onset paroxysmal Afib with RVR, increased post op pain and drainage from incision site, pleural effusions, symptomatic orthostatic hypotension, urinary retention, post op anemia, and CP with spontaneous resolution. Patient was sent to [facility] rehab on 10/17, c/b AMS, sepsis with high fever, rigors, tachycardia on 10/20 found to have MSSA bacteremia with right upper lobe infiltrate, and UTI. CT spine no report of collection. Urine +ve E coli, transferred back to [facility] on 10/23 for MICHAEL, advanced spine imaging, and further management, found to have paraspinal abscess on MR imaging, S/p paraspinal collection aspiration on 10/25, culture growing gram positive cocci in pairs, with rare staph aureus. Neurosurgery advised no acute surgical revision necessary, repeat BCx negative. To continue long-term IV cefazolin via PICC until 12/12/24 (6 wks) with eventual transition to PO Duricef long term/indefinitely as per ID. MICHAEL was negative for valvular vegetations but did reveal incidental PFO. Hospital course significant for recurrent fevers, A fib (resolved) with source control and electrolyte correction, initiated on DOAc given elevated [id number]. Now admitted to [facility] after for initiation of a multidisciplinary rehab program consisting focused on functional mobility, transfers and ADLs.    #Sepsis due to methicillin susceptible Staphylococcus aureus.   - 10/20 Bcx MSSA, 10/21 no growth at 24hrs  - CXR with old rib fracture and small RUL infiltrate  - 10/20 +UA for Ecoli, follow urine culture  - Lactic acidosis resolved, lactate 1.6 10/23  - 10/23 Na 134 K 3.3 Phos 1.5  - MRI: rim-enhancing heterogeneous fluid collection suspicious for an abscess; culture growing GPC in pairs and rare staph aureus   - TTE: no evidence of vegetations  - No Acute surgical intervention per neurosurgery   - S/p paraspinal collection aspiration with paraspinal abscess drain placed with Dr. Guerra 10/25  - Repeat daily Bcx- negative  - Monitor WBC and fever curve  - S/p PICC insertion 10/28  - Cefazolin 2000mg IV q8 until 12/12/24 via PICC- until 12/12/24-> Start PO Duricef 500mg BID on 12/13/24 - long term/indefinitely   - Weekly CBC Diff BMP ESR CRP; emailed to [email or web address]       Comprehensive Multidisciplinary Rehab Program:  - Start comprehensive rehab program, 3 hours a day, 5 days a week.  - PT 2hr/day: Focused on improving strength, endurance, coordination, balance, functional mobility, and transfers  - OT 1hr/day: Focused on improving strength, fine motor skills, coordination, posture and ADLs.    - Activity Limitations: Decreased social, vocational and leisure activities, decreased self care and ADLs, decreased mobility, decreased ability to manage household and finances.     -----------------------------------------------------------------------------------  Concurrent Medical Problems    #Paroxysmal atrial fibrillation.   - 10/25 ECG and exam notable for irregular rate and rhythm  - Metoprolol to 25 BID for rate control  - S/p heparin gtt 3500mg IV q6 for anticoagulation  - EKG on admission for baseline    #Acute on chronic low back pain.   - S/P T9-L3 open fusion with PSO/lag screw partial corpectomy/interbody with complex plastic closure (w/ Dr. Stroud and Dr. Tinajero on 9/29  - TLSO when OOB  - Pain management: Tylenol 975mg ATC TID,  Duloxetine 20mg daily, Lyrica 100mg TID , Flexeril 10mg TID standing,   - Start hydromorphone 4 mg PO q8h PRN  - Eliquis 5mg BID    #HTN  #Orthostatic Hypotension  #HLD  - 10/1 TTE: EF 61%  - S/P 1L IVF bolus (10/13) for symptomatic hypotension with good effect  - Metoprolol to 25 BID  - Atorvastatin 40mg HS  - Losartan 100 mg daily  - Chlorthalidone 25 mg daily on hold  (restart if SBP is consistently above 140 per cardio)  - Midodrine 7.5 mg on hold    # Postoperative anemia  - S/p PRBCs intraoperatively for T spine fusion   - Hgb stable with no evidence of active bleed at this time   - 10/30- Hgb 8.6/ Hct 28.9   - Monitor H/H; transfuse for Hgb <7    #SILVANA (obstructive sleep apnea).   - Transient desaturations on RA    - Nocturnal 2L O2 and PRN during day during naps, monitor sats   - F/u with PCP outpatient as may benefit from sleep study and CPAP.    #Restless legs syndrome (RLS).   - Pramipexole 0.75mg daily     #Seasonal allergies.   - Montelukast 10mg daily    #Liver cyst.  -10/10 CT Abd/pelvis- There is a large cyst, stable since prior exam. There are small hypodense foci on segment II and segment I too small to characterize, unchanged since prior  -F/u outpatient     #Elevated serum alkaline phosphatase level.   - Hi serum alk phos levels 247 --> 228 --> 172 --> 233 --> 239--> 240--> 247--> 234---> 276 (10/28)  - Monitor serum alk phos levels.    #Gastritis.   - Vonoprazan 10mg daily and erythromycin 128mg oral fluid BID    #Mood/Cognition:  Neuropsychology consult PRN    #Sleep:   Maintain quiet hours and low stim environment.  Melatonin 6mg HS PRN to maximize participation in therapy during the day.     #GI/Bowel:  Senna QHS, Miralax PRN Daily    #/Bladder:   - PVR q 8 hours (SC if > 400)    #Skin/Pressure Injury:   - Skin assessment on admission: ***    #Diet/Supplement  Current Diet: Regular- Dash diet   -Ergocalciferol [id number] weekly   -Multivitamin daily     #Precautions / PROPHYLAXIS:   - Falls,  Spinal  - ortho: Weight bearing status: WBAT, TLSO brace OOB  - Lungs: Incentive Spirometer   - DVT ppx: SCDs, TEDs, Eliquis 5mg BID  - Pressure injury/Skin:  OOB to Chair, PT/OT      ---------------  Code Status: FULL  Emergency Contact:    Outpatient Follow-up (Specialty/Name of physician):    Fran Carr  Internal Medicine  [address] [facility], [address] [address]  Phone: [phone]  Fax: [phone]  Follow Up Time:     Kaushal Waterman  Infectious Disease  [address] [place] [address]  Phone: [phone]  Follow Up Time:  [facility] Neurosurgery  Neurosurgery  Referral Assistance Program    Guille Lance  [facility]  ORTHOSURG [address]  Scheduled Appointment: 11/22/2024    Catalino Cotto  [facility]  WEIGHTMGMT  [facility]  Scheduled Appointment: 12/05/2024    Haydee Bernstein  [facility]  GASTRO [address]  Scheduled Appointment: 01/06/2025      [facility] - Infectious Disease  Infectious Disease  [address] [facility], [place] [address]  Phone: [phone]        --------------  Goals: Safe discharge to Home*****  Estimated Length of Stay: 10-14 days  Rehab Potential: Good  Medical Prognosis: Good  Estimated Disposition: Home with Home Care  ---------------    PRESCREEN COMPARISON:  I have reviewed the prescreen information and I have found no relevant changes between the preadmission screening and my post admission evaluation.    RATIONALE FOR INPATIENT ADMISSION: Patient demonstrates the following:  [X] Medically appropriate for rehabilitation admission  [X] Has attainable rehab goals with an appropriate initial discharge plan  [X]Has rehabilitation potential (expected to make a significant improvement within a reasonable period of time)  [X] Requires close medical management by a rehab physician, rehab nursing care, Hospitalist and comprehensive interdisciplinary team (including PT, OT, Prosthetics and Orthotics)       Assessment/Plan:  VIRGINIA HUFFMAN is a 75 y/o F with PMHx of OA, HTN, gastroparesis, peripheral neuropathy, multiple prior thoracolumbar spine surgeries for congenital scoliosis [place] w/ [place] of hardware (1991) and recent fall with T11-T12 fracture, s/p thoracolumbar posterior fusion for t spine fx with plastics closure on  9/29 with Dr. Stroud and Dr. Funk, c/b post op csf leak, new onset paroxysmal Afib with RVR, increased post op pain and drainage from incision site, pleural effusions, symptomatic orthostatic hypotension, urinary retention, post op anemia, and CP with spontaneous resolution. Patient was sent to [facility] rehab on 10/17, c/b AMS, sepsis with high fever, rigors, tachycardia on 10/20 found to have MSSA bacteremia with right upper lobe infiltrate, and UTI. CT spine no report of collection. Urine +ve E coli, transferred back to [facility] on 10/23 for MICHAEL, advanced spine imaging, and further management, found to have paraspinal abscess on MR imaging, S/p paraspinal collection aspiration on 10/25, culture growing gram positive cocci in pairs, with rare staph aureus. Neurosurgery advised no acute surgical revision necessary, repeat BCx negative. To continue long-term IV cefazolin via PICC until 12/12/24 (6 wks) with eventual transition to PO Duricef long term/indefinitely as per ID. MICHAEL was negative for valvular vegetations but did reveal incidental PFO. Hospital course significant for recurrent fevers, A fib (resolved) with source control and electrolyte correction, initiated on DOAc given elevated [id number]. Now admitted to [facility] after for initiation of a multidisciplinary rehab program consisting focused on functional mobility, transfers and ADLs.    #Sepsis due to methicillin susceptible Staphylococcus aureus.   - 10/20 Bcx MSSA, 10/21 no growth at 24hrs  - CXR with old rib fracture and small RUL infiltrate  - 10/20 +UA for Ecoli, follow urine culture  - Lactic acidosis resolved, lactate 1.6 10/23  - 10/23 Na 134 K 3.3 Phos 1.5  - MRI: rim-enhancing heterogeneous fluid collection suspicious for an abscess; culture growing GPC in pairs and rare staph aureus   - TTE: no evidence of vegetations  - No Acute surgical intervention per neurosurgery   - S/p paraspinal collection aspiration with paraspinal abscess drain placed with Dr. Guerra 10/25  - Repeat daily Bcx- negative  - Monitor WBC and fever curve  - S/p PICC insertion 10/28  - Cefazolin 2000mg IV q8 until 12/12/24 via PICC- until 12/12/24-> Start PO Duricef 500mg BID on 12/13/24 - long term/indefinitely   - Weekly CBC Diff BMP ESR CRP; emailed to [email or web address]       Comprehensive Multidisciplinary Rehab Program:  - Start comprehensive rehab program, 3 hours a day, 5 days a week.  - PT 2hr/day: Focused on improving strength, endurance, coordination, balance, functional mobility, and transfers  - OT 1hr/day: Focused on improving strength, fine motor skills, coordination, posture and ADLs.    - Activity Limitations: Decreased social, vocational and leisure activities, decreased self care and ADLs, decreased mobility, decreased ability to manage household and finances.     -----------------------------------------------------------------------------------  Concurrent Medical Problems    #Paroxysmal atrial fibrillation.   - 10/25 ECG and exam notable for irregular rate and rhythm  - Metoprolol to 25 BID for rate control  - S/p heparin gtt 3500mg IV q6 for anticoagulation  - EKG on admission for baseline    #Acute on chronic low back pain.   - S/P T9-L3 open fusion with PSO/lag screw partial corpectomy/interbody with complex plastic closure (w/ Dr. Stroud and Dr. Tinajero on 9/29  - TLSO when OOB  - Pain management: Tylenol 975mg ATC TID,  Duloxetine 20mg daily, Lyrica 100mg TID , Flexeril 10mg TID standing,   - Start hydromorphone 4 mg PO q8h PRN  - Eliquis 5mg BID    #HTN  #Orthostatic Hypotension  #HLD  - 10/1 TTE: EF 61%  - S/P 1L IVF bolus (10/13) for symptomatic hypotension with good effect  - Metoprolol to 25 BID  - Atorvastatin 40mg HS  - Losartan 100 mg daily  - Chlorthalidone 25 mg daily on hold  (restart if SBP is consistently above 140 per cardio)  - Midodrine 7.5 mg on hold    # Postoperative anemia  - S/p PRBCs intraoperatively for T spine fusion   - Hgb stable with no evidence of active bleed at this time   - 10/30- Hgb 8.6/ Hct 28.9   - Monitor H/H; transfuse for Hgb <7    #SILVANA (obstructive sleep apnea).   - Transient desaturations on RA    - Nocturnal 2L O2 and PRN during day during naps, monitor sats   - F/u with PCP outpatient as may benefit from sleep study and CPAP.    #Restless legs syndrome (RLS).   - Pramipexole 0.75mg daily     #Seasonal allergies.   - Montelukast 10mg daily    #Liver cyst.  -10/10 CT Abd/pelvis- There is a large cyst, stable since prior exam. There are small hypodense foci on segment II and segment I too small to characterize, unchanged since prior  -F/u outpatient     #Elevated serum alkaline phosphatase level.   - Hi serum alk phos levels 247 --> 228 --> 172 --> 233 --> 239--> 240--> 247--> 234---> 276 (10/28)  - Monitor serum alk phos levels.    #Gastritis.   - Vonoprazan 10mg daily and erythromycin 128mg oral fluid BID    #Mood/Cognition:  Neuropsychology consult PRN    #Sleep:   Maintain quiet hours and low stim environment.  Melatonin 6mg HS PRN to maximize participation in therapy during the day.     #GI/Bowel:  Senna QHS, Miralax PRN Daily    #/Bladder:   - PVR q 8 hours (SC if > 400)    #Skin/Pressure Injury:   - Skin assessment on admission: Midline sine dressing with drain in place, with minimal drainage. No pressure injuries.    #Diet/Supplement  Current Diet: Regular- Dash diet   -Ergocalciferol 99519L weekly   -Multivitamin daily     #Precautions / PROPHYLAXIS:   - Falls,  Spinal  - ortho: Weight bearing status: WBAT, TLSO brace OOB  - Lungs: Incentive Spirometer   - DVT ppx: SCDs, TEDs, Eliquis 5mg BID  - Pressure injury/Skin:  OOB to Chair, PT/OT      ---------------  Code Status: FULL  Emergency Contact:    Outpatient Follow-up (Specialty/Name of physician):    Fran Carr  Internal Medicine  [facility], [address] [address]  Phone: [phone]  Fax: [phone]  Follow Up Time:     Kaushal Waterman  Infectious Disease  [address] [place] [address]  Phone: [phone]  Follow Up Time:  [facility]  Neurosurgery  Referral Assistance Program    Guille Lance  [facility]  ORTHOSURG [address]  Scheduled Appointment: 11/22/2024    Catalino Cotto  [facility]  WEIGHTMGMT  [facility]  Scheduled Appointment: 12/05/2024    Haydee Bernstein  [facility]  GASTRO [address]  Scheduled Appointment: 01/06/2025      [facility] Infectious Disease  Infectious Disease  [address] [place] [address], [place]ious [place] [address]  Phone: [phone]        --------------  Goals: Safe discharge to Home*****  Estimated Length of Stay: 10-14 days  Rehab Potential: Good  Medical Prognosis: Good  Estimated Disposition: Home with Home Care  ---------------    PRESCREEN COMPARISON:  I have reviewed the prescreen information and I have found no relevant changes between the preadmission screening and my post admission evaluation.    RATIONALE FOR INPATIENT ADMISSION: Patient demonstrates the following:  [X] Medically appropriate for rehabilitation admission  [X] Has attainable rehab goals with an appropriate initial discharge plan  [X]Has rehabilitation potential (expected to make a significant improvement within a reasonable period of time)  [X] Requires close medical management by a rehab physician, rehab nursing care, Hospitalist and comprehensive interdisciplinary team (including PT, OT, Prosthetics and Orthotics)       Assessment/Plan:  VIRGINIA HUFFMAN is a 73 y/o F with PMHx of OA, HTN, gastroparesis, peripheral neuropathy, multiple prior thoracolumbar spine surgeries for congenital scoliosis [place] >[place] w/ [place] of hardware (1991) and recent fall with T11-T12 fracture, s/p thoracolumbar posterior fusion for t spine fx with plastics closure on  9/29 with Dr. Stroud and Dr. Funk, c/b post op csf leak, new onset paroxysmal Afib with RVR, increased post op pain and drainage from incision site, pleural effusions, symptomatic orthostatic hypotension, urinary retention, post op anemia, and CP with spontaneous resolution. Patient was sent to [facility] rehab on 10/17, c/b AMS, sepsis with high fever, rigors, tachycardia on 10/20 found to have MSSA bacteremia with right upper lobe infiltrate, and UTI. CT spine no report of collection. Urine +ve E coli, transferred back to [facility] on 10/23 for MICHAEL, advanced spine imaging, and further management, found to have paraspinal abscess on MR imaging, S/p paraspinal collection aspiration on 10/25, culture growing gram positive cocci in pairs, with rare staph aureus. Neurosurgery advised no acute surgical revision necessary, repeat BCx negative. To continue long-term IV cefazolin via PICC until 12/12/24 (6 wks) with eventual transition to PO Duricef long term/indefinitely as per ID. MICHAEL was negative for valvular vegetations but did reveal incidental PFO. Hospital course significant for recurrent fevers, A fib (resolved) with source control and electrolyte correction, initiated on DOAc given elevated [id number]. Now admitted to [facility] after for initiation of a multidisciplinary rehab program consisting focused on functional mobility, transfers and ADLs.    #Paraspinal abscess with Sepsis due to methicillin susceptible Staphylococcus aureus.   - 10/20 Bcx MSSA, 10/21 no growth at 24hrs  - CXR with old rib fracture and small RUL infiltrate  - 10/20 +UA for Ecoli, follow urine culture  - Lactic acidosis resolved, lactate 1.6 10/23  - 10/23 Na 134 K 3.3 Phos 1.5  - MRI: rim-enhancing heterogeneous fluid collection suspicious for an abscess; culture growing GPC in pairs and rare staph aureus   - TTE: no evidence of vegetations  - No Acute surgical intervention per neurosurgery   - S/p paraspinal collection aspiration with paraspinal abscess drain placed with Dr. Guerra 10/25  - Repeat daily Bcx- negative  - Monitor WBC and fever curve  - S/p PICC insertion 10/28  - TLSO when OOB  - Cefazolin 2000mg IV q8 until 12/12/24 via PICC- until 12/12/24-> Start PO Duricef 500mg BID on 12/13/24 - long term/indefinitely   - Weekly CBC Diff BMP ESR CRP; emailed to [email or web address]  - (10/31) CRP 56, ESR 95       Comprehensive Multidisciplinary Rehab Program:  - Start comprehensive rehab program, 3 hours a day, 5 days a week.  - PT 2hr/day: Focused on improving strength, endurance, coordination, balance, functional mobility, and transfers  - OT 1hr/day: Focused on improving strength, fine motor skills, coordination, posture and ADLs.    - Activity Limitations: Decreased social, vocational and leisure activities, decreased self care and ADLs, decreased mobility, decreased ability to manage household and finances.     -----------------------------------------------------------------------------------  Concurrent Medical Problems    #Paroxysmal atrial fibrillation.   - 10/25 ECG and exam notable for irregular rate and rhythm  - Metoprolol to 25 BID for rate control  - S/p heparin gtt 3500mg IV q6 for anticoagulation  - EKG - tachy to 110s this am in the setting of pain    #Acute on chronic low back pain.   - S/P T9-L3 open fusion with PSO/lag screw partial corpectomy/interbody with complex plastic closure (w/ Dr. Stroud and Dr. Tinajero on 9/29  - TLSO when OOB  - Pain management: Tylenol 975mg ATC TID,  Duloxetine 20mg daily, Lyrica 100mg TID , Flexeril 10mg TID standing,   - Start hydromorphone 4 mg PO q8h PRN  - Eliquis 5mg BID    #HTN  #Orthostatic Hypotension  #HLD  - 10/1 TTE: EF 61%  - S/P 1L IVF bolus (10/13) for symptomatic hypotension with good effect  - Metoprolol to 25 BID  - Atorvastatin 40mg HS  - Losartan 100 mg daily  - Chlorthalidone 25 mg daily on hold  (restart if SBP is consistently above 140 per cardio)  - Midodrine 7.5 mg on hold    # Postoperative anemia  - S/p PRBCs intraoperatively for T spine fusion   - Hgb stable with no evidence of active bleed at this time   - 10/30- Hgb 8.6/ Hct 28.9, (10/31) 9.4/30.3    - Monitor H/H; transfuse for Hgb <7    #SILVANA (obstructive sleep apnea).   - Transient desaturations on RA    - Nocturnal 2L O2 and PRN during day during naps, monitor sats   - F/u with PCP outpatient as may benefit from sleep study and CPAP.    #Restless legs syndrome (RLS).   - Pramipexole 0.75mg daily     #Seasonal allergies.   - Montelukast 10mg daily    #Liver cyst.  -10/10 CT Abd/pelvis- There is a large cyst, stable since prior exam. There are small hypodense foci on segment II and segment I too small to characterize, unchanged since prior  -F/u outpatient     #Elevated serum alkaline phosphatase level.   - Hi serum alk phos levels 247 --> 228 --> 172 --> 233 --> 239--> 240--> 247--> 234---> 276 (10/31) 227  - Monitor serum alk phos levels.    #Gastritis.   - Vonoprazan 10mg daily  - Erythromycin 128mg oral fluid BID    #Mood/Cognition:  Neuropsychology consult PRN    #Sleep:   Maintain quiet hours and low stim environment.  Melatonin 6mg HS PRN to maximize participation in therapy during the day.     #GI/Bowel:  Senna QHS  Miralax PRN Daily    #/Bladder:   - PVR q 8 hours (SC if > 400)    #Skin/Pressure Injury:   - Skin assessment on admission: Midline sine dressing with drain in place, with minimal drainage. No pressure injuries.    #Diet/Supplement  Current Diet: Regular- Dash diet   -Ergocalciferol 13961T weekly   -Multivitamin daily     #Precautions / PROPHYLAXIS:   - Falls,  Spinal  - ortho: Weight bearing status: WBAT, TLSO brace OOB  - Lungs: Incentive Spirometer   - DVT ppx: SCDs, TEDs, Eliquis 5mg BID  - Pressure injury/Skin:  OOB to Chair, PT/OT      ---------------  Code Status: FULL  Emergency Contact:    Outpatient Follow-up (Specialty/Name of physician):    Fran Carr  Internal Medicine  [address] [facility], [address] [address]  Phone: [phone]  Fax: [phone]  Follow Up Time:     Kaushal Waterman  Infectious Disease  [address] [place] [address]  Phone: [phone]  Follow Up Time:  [facility] Neurosurgery  Neurosurgery  Referral Assistance Program    Guille Lance  [facility]  ORTHOSURG [address]  Scheduled Appointment: 11/22/2024    Catalino Cotto  [facility]  WEIGHTMGMT  [facility]  Scheduled Appointment: 12/05/2024    Haydee Bernstein  [facility]  GASTRO [address]  Scheduled Appointment: 01/06/2025      [facility] - Infectious Disease  Infectious Disease  [address], Infectious [place] [address]  Phone: [phone]        --------------  Goals: Safe discharge to Home*****  Estimated Length of Stay: 10-14 days  Rehab Potential: Good  Medical Prognosis: Good  Estimated Disposition: Home with Home Care  ---------------    PRESCREEN COMPARISON:  I have reviewed the prescreen information and I have found no relevant changes between the preadmission screening and my post admission evaluation.    RATIONALE FOR INPATIENT ADMISSION: Patient demonstrates the following:  [X] Medically appropriate for rehabilitation admission  [X] Has attainable rehab goals with an appropriate initial discharge plan  [X]Has rehabilitation potential (expected to make a significant improvement within a reasonable period of time)  [X] Requires close medical management by a rehab physician, rehab nursing care, Hospitalist and comprehensive interdisciplinary team (including PT, OT, Prosthetics and Orthotics)       Assessment/Plan:  Mrs. Haley French is a 74-year-old female patient with past medical history of OA, HTN, gastroparesis, peripheral neuropathy, multiple prior thoracolumbar spine surgeries for congenital scoliosis [place] >[place] of [place] w/ removal of hardware (1991) who is admitted for Acute Inpatient Rehabilitation with a multidisciplinary rehab program at [facility] with functional impairments in ADLs and mobility secondary to mechanical fall reporting a T12 three column spine fracture-malalignment treated surgically with a T9-L3 open fusion with PSO/lag screw partial corpectomy/interbody by Dr. Stroud, Neurosurgeon with complex Plastic Surgery closure by Dr. Elias on 9/29/24 subsequently complicated with MSSA bacteremia secondary to a paraspinal abscess s/p paraspinal collection aspiration on 10/25/24.     #Traumatic T9-L3 vertebral fracture s/p corpectomy and fusion surgery c/b MSSA Bacteremia 2/2 paraspinal abscess  - Paraspinal abscess with Sepsis due to methicillin susceptible Staphylococcus aureus.       * 10/20 Bcx MSSA, 10/21 no growth at 24hrs      * CXR with old rib fracture and small RUL infiltrate      * 10/20 +UA for Ecoli, follow urine culture      * Lactic acidosis resolved, lactate 1.6 10/23      * 10/23 Na 134 K 3.3 Phos 1.5      * MRI: rim-enhancing heterogeneous fluid collection suspicious for an abscess; culture growing GPC in pairs and rare staph aureus       * TTE: no evidence of vegetations      * No Acute surgical intervention per neurosurgery       * S/P paraspinal collection aspiration with paraspinal abscess drain placed with Dr. Guerra 10/25      * Repeat daily Bcx- negative      * Monitor WBC and fever curve      * S/P PICC insertion 10/28      * TLSO when OOB      * Cefazolin 2000mg IV q8 until 12/12/24 via PICC- until 12/12/24-> Start PO Duricef 500mg BID on 12/13/24 - long term/indefinitely   - Weekly CBC Diff BMP ESR CRP; emailed to [email or web address]  - (10/31) CRP 56, ESR 95   - Activity Limitations: Decreased social, vocational and leisure activities, decreased self care and ADLs, decreased mobility, decreased ability to manage household and finances.   - Comprehensive Multidisciplinary Rehab Program:      * 3 hours a day, 5 days a week.      * PT 2hr/day: Focused on improving strength, endurance, coordination, balance, functional mobility, and transfers      * OT 1hr/day: Focused on improving strength, fine motor skills, coordination, posture and ADLs.      -----------------------------------------------------------------------------------  Concurrent Medical Problems    #Paroxysmal atrial fibrillation.   - 10/25 ECG and exam notable for irregular rate and rhythm  - Metoprolol to 25 BID for rate control  - S/p heparin gtt 3500mg IV q6 for anticoagulation  - EKG - tachy to 110s this am in the setting of pain    #Acute on chronic low back pain.   - S/P T9-L3 open fusion with PSO/lag screw partial corpectomy/interbody with complex plastic closure (w/ Dr. Stroud and Dr. Tinajero on 9/29  - TLSO when OOB  - Pain management: Tylenol 975mg ATC TID,  Duloxetine 20mg daily, Lyrica 100mg TID , Flexeril 10mg TID standing,   - Start hydromorphone 4 mg PO q8h PRN  - Eliquis 5mg BID    #HTN  #Orthostatic Hypotension  #HLD  - 10/1 TTE: EF 61%  - S/P 1L IVF bolus (10/13) for symptomatic hypotension with good effect  - Metoprolol to 25 BID  - Atorvastatin 40mg HS  - Losartan 100 mg daily  - Chlorthalidone 25 mg daily on hold  (restart if SBP is consistently above 140 per cardio)  - Midodrine 7.5 mg on hold    #Postoperative anemia  - S/p PRBCs intraoperatively for T spine fusion   - Hgb stable with no evidence of active bleed at this time   - 10/30- Hgb 8.6/ Hct 28.9, (10/31) 9.4/30.3    - Monitor H/H; transfuse for Hgb <7    #SILVANA (obstructive sleep apnea).   - Transient desaturations on RA    - Nocturnal 2L O2 and PRN during day during naps, monitor sats   - F/u with PCP outpatient as may benefit from sleep study and CPAP.    #Restless legs syndrome (RLS).   - Pramipexole 0.75mg daily     #Seasonal allergies.   - Montelukast 10mg daily    #Liver cyst.  -10/10 CT Abd/pelvis- There is a large cyst, stable since prior exam. There are small hypodense foci on segment II and segment I too small to characterize, unchanged since prior  - F/u outpatient     #Elevated serum alkaline phosphatase level.   - Hi serum alk phos levels 247 --> 228 --> 172 --> 233 --> 239--> 240--> 247--> 234---> 276 (10/31) 227  - Monitor serum alk phos levels.    #Gastritis.   - Vonoprazan 10mg daily  - Erythromycin 128mg oral fluid BID    #Mood/Cognition:  Neuropsychology consult PRN    #Sleep:   Maintain quiet hours and low stim environment.  Melatonin 6mg HS PRN to maximize participation in therapy during the day.     #GI/Bowel:  Senna QHS  Miralax PRN Daily    #/Bladder:   - PVR q 8 hours (SC if > 400)    #Skin/Pressure Injury:   - Skin assessment on admission: Midline sine dressing with drain in place, with minimal drainage. No pressure injuries.    #Diet/Supplement  Current Diet: Regular- Dash diet   -Ergocalciferol 08795K weekly   -Multivitamin daily     #Precautions / PROPHYLAXIS:   - Falls,  Spinal  - ortho: Weight bearing status: WBAT, TLSO brace OOB  - Lungs: Incentive Spirometer   - DVT ppx: SCDs, TEDs, Eliquis 5mg BID  - Pressure injury/Skin:  OOB to Chair, PT/OT      ---------------  Code Status: FULL  Emergency Contact:    Outpatient Follow-up (Specialty/Name of physician):    Fran Carr  Internal Medicine  [address] [facility], [address]  [place] [address]  Phone: [phone]  Fax: [phone]  Follow Up Time:     Kaushal Waterman  Infectious Disease  [address] [place] [address]  Phone: [phone]  Follow Up Time:  [facility] Neurosurgery  Neurosurgery  Referral Assistance Program    Guille Lance  [facility]  ORTHOSURG [address] [facility]  Scheduled Appointment: 11/22/2024    Catalino Cotto  [facility]  WEIGHTMGMT  [place]  Scheduled Appointment: 12/05/2024    Haydee Bernstein  [facility]  GASTRO [address]  Scheduled Appointment: 01/06/2025      [facility] - Infectious Disease  Infectious Disease  [address], [place]ious [place] [address]  Phone: [phone]        --------------  Goals: Safe discharge to Home*****  Estimated Length of Stay: 10-14 days  Rehab Potential: Good  Medical Prognosis: Good  Estimated Disposition: Home with Home Care  ---------------    PRESCREEN COMPARISON:  I have reviewed the prescreen information and I have found no relevant changes between the preadmission screening and my post admission evaluation.    RATIONALE FOR INPATIENT ADMISSION: Patient demonstrates the following:  [X] Medically appropriate for rehabilitation admission  [X] Has attainable rehab goals with an appropriate initial discharge plan  [X]Has rehabilitation potential (expected to make a significant improvement within a reasonable period of time)  [X] Requires close medical management by a rehab physician, rehab nursing care, Hospitalist and comprehensive interdisciplinary team (including PT, OT, Prosthetics and Orthotics)

## 2024-10-30 NOTE — PROGRESS NOTE ADULT - PROBLEM SELECTOR PLAN 3
- S/P T9-L3 open fusion with PSO/lag screw partial corpectomy/interbody with complex plastic closure (w/ Dr. Stroud and Dr. Tinajero on 9/29)   - TLSO when OOB  - Previous pain management: Tylenol 975mg ATC, Duloxetine 20mg daily, Lyrica 100mg TID , Flexeril 5mg TID standing,   - Start hydromorphone 2 mg PO q6  - Consulted neurosug, f/u reccs - S/P T9-L3 open fusion with PSO/lag screw partial corpectomy/interbody with complex plastic closure (w/ Dr. Stroud and Dr. Tinajero on 9/29)   - TLSO when OOB  - Previous pain management: Tylenol 975mg ATC, Duloxetine 20mg daily, Lyrica 100mg TID , Increased Flexeril to 10 mg TID standing,   - Increased hydromorphone 4 mg PO q6  - Consulted neurosug, f/u reccs

## 2024-10-30 NOTE — PROGRESS NOTE ADULT - SUBJECTIVE AND OBJECTIVE BOX
***************************************************************  Yunior Hanna) [facility] PGY3  Internal Medicine   ***************************************************************    VIRGINIA HUFFMAN  74y  MRN: [id number]    Patient is a 74y old  Female who presents with a chief complaint of Bacteremia (29 Oct 2024 09:27)      Subjective:     MEDICATIONS  (STANDING):  acetaminophen     Tablet .. 975 milliGRAM(s) Oral every 8 hours  apixaban 5 milliGRAM(s) Oral every 12 hours  atorvastatin 40 milliGRAM(s) Oral at bedtime  ceFAZolin   IVPB 2000 milliGRAM(s) IV Intermittent every 8 hours  cyclobenzaprine 5 milliGRAM(s) Oral three times a day  DULoxetine 20 milliGRAM(s) Oral daily  ergocalciferol 24979 Unit(s) Oral <User Schedule>  erythromycin    ethylsuccinate Suspension 40 mG/mL 128 milliGRAM(s) Oral every 12 hours  losartan 100 milliGRAM(s) Oral daily  melatonin 5 milliGRAM(s) Oral at bedtime  metoprolol tartrate 25 milliGRAM(s) Oral two times a day  montelukast 10 milliGRAM(s) Oral daily  multivitamin 1 Tablet(s) Oral daily  polyethylene glycol 3350 17 Gram(s) Oral two times a day  pramipexole 0.75 milliGRAM(s) Oral <User Schedule>  pregabalin 100 milliGRAM(s) Oral every 8 hours  senna 2 Tablet(s) Oral at bedtime  Vonoprazan (Voquenza) Tablet 10 milliGRAM(s) 1 Tablet(s) Oral daily    MEDICATIONS  (PRN):  benzocaine/menthol Lozenge 1 Lozenge Oral every 3 hours PRN Mouth Sores  HYDROmorphone   Tablet 2 milliGRAM(s) Oral every 8 hours PRN Severe Pain (7 - 10)      Objective:    Vitals: Vital Signs Last 24 Hrs  T(C): 37 (10-30-24 @ 04:12), Max: 37.5 (10-29-24 @ 11:16)  T(F): 98.6 (10-30-24 @ 04:12), Max: 99.5 (10-29-24 @ 11:16)  HR: 95 (10-30-24 @ 04:12) (85 - 110)  BP: 148/75 (10-30-24 @ 04:12) (100/62 - 148/75)  BP(mean): --  RR: 18 (10-30-24 @ 04:12) (18 - 18)  SpO2: 92% (10-30-24 @ 04:12) (92% - 95%)            I&O's Summary    28 Oct 2024 07:01  -  29 Oct 2024 07:00  --------------------------------------------------------  IN: 540 mL / OUT: 475 mL / NET: 65 mL        PHYSICAL EXAM:  GENERAL: NAD  HEAD:  Atraumatic, Normocephalic  EYES: EOMI, conjunctiva and sclera clear  CHEST/LUNG: Clear to auscultation bilaterally; No rales, rhonchi, wheezing, or rubs  HEART: Regular rate and rhythm; No murmurs, rubs, or gallops  ABDOMEN: Soft, Nontender, Nondistended;   SKIN: No rashes or lesions  NERVOUS SYSTEM:  Alert & Oriented X3, no focal deficits    LABS:  10-28    139  |  104  |  21  ----------------------------<  106[H]  4.3   |  23  |  0.79  10-27    138  |  106  |  20  ----------------------------<  95  4.6   |  18[L]  |  0.64    Ca    9.2      28 Oct 2024 07:08  Ca    8.7      27 Oct 2024 07:24  Phos  3.0     10-28  Mg     1.9     10-28    TPro  6.4  /  Alb  3.4  /  TBili  0.2  /  DBili  x   /  AST  31  /  ALT  29  /  AlkPhos  276[H]  10-28  TPro  6.1  /  Alb  3.3  /  TBili  0.2  /  DBili  x   /  AST  45[H]  /  ALT  34  /  AlkPhos  234[H]  10-27      PTT - ( 28 Oct 2024 07:10 )  PTT:45.7 sec              Urinalysis Basic - ( 28 Oct 2024 07:08 )    Color: x / Appearance: x / SG: x / pH: x  Gluc: 106 mg/dL / Ketone: x  / Bili: x / Urobili: x   Blood: x / Protein: x / Nitrite: x   Leuk Esterase: x / RBC: x / WBC x   Sq Epi: x / Non Sq Epi: x / Bacteria: x                              8.6    8.20  )-----------( 465      ( 30 Oct 2024 06:32 )             28.9                         8.1    7.24  )-----------( 415      ( 29 Oct 2024 06:19 )             27.0                         8.6    8.58  )-----------( 487      ( 28 Oct 2024 07:10 )             28.3     CAPILLARY BLOOD GLUCOSE          RADIOLOGY & ADDITIONAL TESTS:    Imaging Personally Reviewed:  [x ] YES  [ ] NO    Consultants involved in case:   Consultant(s) Notes Reviewed:  [ x] YES  [ ] NO:   Care Discussed with Consultants/Other Providers [x ] YES  [ ] NO         ***************************************************************  Yunior (Jose Antonio) [facility] PGY3  Internal Medicine   ***************************************************************    VIRGINIA HUFFMAN  74y  MRN: [id number]    Patient is a 74y old  Female who presents with a chief complaint of Bacteremia (29 Oct 2024 09:27)      Subjective: NAEO. Patient w/ continuing back and chest wall pain.     MEDICATIONS  (STANDING):  acetaminophen     Tablet .. 975 milliGRAM(s) Oral every 8 hours  apixaban 5 milliGRAM(s) Oral every 12 hours  atorvastatin 40 milliGRAM(s) Oral at bedtime  ceFAZolin   IVPB 2000 milliGRAM(s) IV Intermittent every 8 hours  cyclobenzaprine 5 milliGRAM(s) Oral three times a day  DULoxetine 20 milliGRAM(s) Oral daily  ergocalciferol 64164 Unit(s) Oral <User Schedule>  erythromycin    ethylsuccinate Suspension 40 mG/mL 128 milliGRAM(s) Oral every 12 hours  losartan 100 milliGRAM(s) Oral daily  melatonin 5 milliGRAM(s) Oral at bedtime  metoprolol tartrate 25 milliGRAM(s) Oral two times a day  montelukast 10 milliGRAM(s) Oral daily  multivitamin 1 Tablet(s) Oral daily  polyethylene glycol 3350 17 Gram(s) Oral two times a day  pramipexole 0.75 milliGRAM(s) Oral <User Schedule>  pregabalin 100 milliGRAM(s) Oral every 8 hours  senna 2 Tablet(s) Oral at bedtime  Vonoprazan (Voquenza) Tablet 10 milliGRAM(s) 1 Tablet(s) Oral daily    MEDICATIONS  (PRN):  benzocaine/menthol Lozenge 1 Lozenge Oral every 3 hours PRN Mouth Sores  HYDROmorphone   Tablet 2 milliGRAM(s) Oral every 8 hours PRN Severe Pain (7 - 10)      Objective:    Vitals: Vital Signs Last 24 Hrs  T(C): 37 (10-30-24 @ 04:12), Max: 37.5 (10-29-24 @ 11:16)  T(F): 98.6 (10-30-24 @ 04:12), Max: 99.5 (10-29-24 @ 11:16)  HR: 95 (10-30-24 @ 04:12) (85 - 110)  BP: 148/75 (10-30-24 @ 04:12) (100/62 - 148/75)  BP(mean): --  RR: 18 (10-30-24 @ 04:12) (18 - 18)  SpO2: 92% (10-30-24 @ 04:12) (92% - 95%)            I&O's Summary    28 Oct 2024 07:01  -  29 Oct 2024 07:00  --------------------------------------------------------  IN: 540 mL / OUT: 475 mL / NET: 65 mL        PHYSICAL EXAM:  GENERAL: NAD  HEAD:  Atraumatic, Normocephalic  EYES: EOMI, conjunctiva and sclera clear  CHEST/LUNG: Clear to auscultation bilaterally; No rales, rhonchi, wheezing, or rubs  HEART: Regular rate and rhythm; No murmurs, rubs, or gallops  ABDOMEN: Soft, Nontender, Nondistended;   SKIN: No rashes or lesions  NERVOUS SYSTEM:  Alert & Oriented X3, no focal deficits    LABS:  10-28    139  |  104  |  21  ----------------------------<  106[H]  4.3   |  23  |  0.79  10-27    138  |  106  |  20  ----------------------------<  95  4.6   |  18[L]  |  0.64    Ca    9.2      28 Oct 2024 07:08  Ca    8.7      27 Oct 2024 07:24  Phos  3.0     10-28  Mg     1.9     10-28    TPro  6.4  /  Alb  3.4  /  TBili  0.2  /  DBili  x   /  AST  31  /  ALT  29  /  AlkPhos  276[H]  10-28  TPro  6.1  /  Alb  3.3  /  TBili  0.2  /  DBili  x   /  AST  45[H]  /  ALT  34  /  AlkPhos  234[H]  10-27      PTT - ( 28 Oct 2024 07:10 )  PTT:45.7 sec              Urinalysis Basic - ( 28 Oct 2024 07:08 )    Color: x / Appearance: x / SG: x / pH: x  Gluc: 106 mg/dL / Ketone: x  / Bili: x / Urobili: x   Blood: x / Protein: x / Nitrite: x   Leuk Esterase: x / RBC: x / WBC x   Sq Epi: x / Non Sq Epi: x / Bacteria: x                              8.6    8.20  )-----------( 465      ( 30 Oct 2024 06:32 )             28.9                         8.1    7.24  )-----------( 415      ( 29 Oct 2024 06:19 )             27.0                         8.6    8.58  )-----------( 487      ( 28 Oct 2024 07:10 )             28.3     CAPILLARY BLOOD GLUCOSE          RADIOLOGY & ADDITIONAL TESTS:    Imaging Personally Reviewed:  [x ] YES  [ ] NO    Consultants involved in case:   Consultant(s) Notes Reviewed:  [ x] YES  [ ] NO:   Care Discussed with Consultants/Other Providers [x ] YES  [ ] NO

## 2024-10-30 NOTE — H&P ADULT - NSHPPHYSICALEXAM_GEN_ALL_CORE
Gen - NAD, Comfortable  HEENT - NCAT, EOMI, MMM  Neck - Supple, No limited ROM  Pulm - CTAB, No wheeze, No rhonchi, No crackles  Cardiovascular - RRR, S1S2, No murmurs  Abdomen - Soft, mildly tender in RLQ, +BS throughout  Extremities - No C/C/E, No calf tenderness bl  Neuro-     Cognitive - AAOx3     Communication - Fluent, No dysarthria     Cranial Nerves - CN 2-12 intact     Motor -                     LEFT    UE - ShAB 4/5, EF 5/5, EE 5/5, WE 5/5,  5/5                    RIGHT UE - ShAB 4/5, EF 5/5, EE 5/5, WE 5/5,  5/5                    LEFT    LE - HF 4/5, KE 5/5, DF 5/5, PF 5/5                    RIGHT LE - HF 4/5, KE 5/5, DF 5/5, PF 5/5        Sensory - Intact to LT in UEs and LEs bl.     Reflexes - DTRs Intact     Coordination - FTN intact  Psychiatric - Mood stable, Affect WNL  Skin: Midline dressing with drain exiting spine in place with minimal collection in bag. Clean dry and skin intact without erythema or breakdown. 2, 1-2cm  small incision sites, 1 to L and 1 to R of Thoracic spine at site of prior drains. Healing well with skin approximated and no drainage or signs of infection. Gen - NAD, Comfortable  HEENT - NCAT, EOMI, PERRLA  Neck - Supple, No limited ROM  Pulm - CTAB, No wheeze, No rhonchi, No crackles  Cardiovascular - RRR, S1S2, No murmurs  Abdomen - Soft, mildly tender in RLQ, (+) BS throughout  Extremities - No C/C/E, No calf tenderness B/L  Neuro-     Cognitive - AAOx3, follows command     Communication - Fluent, No dysarthria     Cranial Nerves - CN 2-12 intact     Motor -                     LEFT    UE - ShAB 4/5, EF 5/5, EE 5/5, WE 5/5,  5/5                    RIGHT UE - ShAB 4/5, EF 5/5, EE 5/5, WE 5/5,  5/5                    LEFT    LE - HF 4/5, KE 5/5, DF 5/5, PF 5/5                    RIGHT LE - HF 4/5, KE 5/5, DF 5/5, PF 5/5        Sensory - Intact to LT in UEs and LEs B/L      Reflexes - DTRs Intact     Coordination - FTN intact  Psychiatric - Mood stable, Affect WNL  Skin: Midline dressing with drain exiting spine in place with minimal collection in bag. Clean dry and skin intact without erythema or breakdown. 2, 1-2cm  small incision sites, 1 to L and 1 to R of Thoracic spine at site of prior drains. Healing well with skin approximated and no drainage or signs of infection.

## 2024-10-30 NOTE — PROGRESS NOTE ADULT - PROBLEM SELECTOR PLAN 4
- 10/1 TTE: EF 61%  - S/P 1L IVF bolus (10/13) for symptomatic hypotension  - /66  -> 146/77 -> 141/90    Plan  - Increase Metoprolol to 25 BID  - c/w atorvastatin 40mg PO qd  - Restart Losartan 100 mg daily and Chlorthalidone 25 mg daily (restart if SBP is consistently above 140 per cardio)  - HOLD midodrine 7.5 mg AM - 10/1 TTE: EF 61%  - S/P 1L IVF bolus (10/13) for symptomatic hypotension  - /66  -> 146/77 -> 141/90    Plan  - Increased Metoprolol to 25 BID  - c/w atorvastatin 40mg PO qd  - Restart Losartan 100 mg daily and Chlorthalidone 25 mg daily (restart if SBP is consistently above 140 per cardio)  - HOLD midodrine 7.5 mg AM

## 2024-10-30 NOTE — PROGRESS NOTE ADULT - PROBLEM SELECTOR PROBLEM 1
Sepsis due to methicillin susceptible Staphylococcus aureus

## 2024-10-30 NOTE — DISCHARGE NOTE NURSING/CASE MANAGEMENT/SOCIAL WORK - NSDCPEFALRISK_GEN_ALL_CORE
For information on Fall & Injury Prevention, visit: [email or web address] OR  [email or web address] OR  https://www.cdc.gov/steadi/patient.html

## 2024-10-30 NOTE — PROGRESS NOTE ADULT - ASSESSMENT
- s/p tap w/ rare staph aureus  - BCx 10/20 MSSA   - blood cx 10/21 neg  - s/p picc line placement  - beta-2 transferrin negative, low c/f CSF leak  - c/w conservative mgmt, abx, drainage  - no c/i to d/c back to [place] if medically stable

## 2024-10-30 NOTE — H&P ADULT - REASON FOR ADMISSION
MSSA Bacteremia s/p thoracolumbar posterior fusion of T Spine MSSA Bacteremia 2/2 paraspinal abscess s/p thoracolumbar (T9-L3) posterior fusion of T Spine revision Traumatic T9-L3 vertebral fracture s/p corpectomy and fusion surgery c/b MSSA Bacteremia 2/2 paraspinal abscess

## 2024-10-30 NOTE — PROGRESS NOTE ADULT - REASON FOR ADMISSION
Bacteremia

## 2024-10-30 NOTE — H&P ADULT - NSHPADDITIONALINFOADULT_GEN_ALL_CORE
Saw and evaluated the patient, reviewed medical records, took history, examined, started an assessment and management plan. Spent 75 minutes excluding teaching time

## 2024-10-30 NOTE — DISCHARGE NOTE NURSING/CASE MANAGEMENT/SOCIAL WORK - FINANCIAL ASSISTANCE
[facility] provides services at a reduced cost to those who are determined to be eligible through [facility]’s financial assistance program. Information regarding [facility]’s financial assistance program can be found by going to [email or web address] or by calling [phone].

## 2024-10-30 NOTE — DISCHARGE NOTE NURSING/CASE MANAGEMENT/SOCIAL WORK - NSDCFUADDAPPT_GEN_ALL_CORE_FT
APPTS ARE READY TO BE MADE: [ X ] YES    Best Family or Patient Contact (if needed):    Additional Information about above appointments (if needed):    1: PCP (Dr. Carr)  2: Neurosurgery  3: Infectious Disease    Other comments or requests:       You may call the IR booking office @ [phone] to make a 7-10 day follow up appointment for drain evaluation. Please feel free to contact us at [phone] if any problems arise. After 6PM, Monday through Friday, on weekends and on holidays, please call [phone] and ask for the radiology resident on call to be paged.

## 2024-10-30 NOTE — PROGRESS NOTE ADULT - ASSESSMENT
74-year-old female patient with past medical history of OA, HTN, gastroparesis, peripheral neuropathy, multiple prior thoracolumbar spine surgeries for congenital scoliosis [place] w/ [place] of hardware (1991) and recent fall with T11-T12 fracture, s/p thoracolumbar posterior fusion for t spine fx 9/29, c/b post op csf leak, sent to rehab on 10/17, c/b sepsis with high fever, rigors, tachycardia on 10/20 found to have mssa bacteremia. CT spine no report of collection. Urine +ve E coli, transferred back to [facility] for MICHAEL, advanced spine imaging, and further management of MSSA bacteremia.

## 2024-10-30 NOTE — PROGRESS NOTE ADULT - PROBLEM SELECTOR PLAN 10
- Hi serum alk phos levels 247 --> 228 --> 172 --> 233 (10/23)    Plan  - Monitor serum alk phos levels

## 2024-10-30 NOTE — PROGRESS NOTE ADULT - PROBLEM SELECTOR PROBLEM 3
Acute on chronic low back pain

## 2024-10-30 NOTE — H&P ADULT - HISTORY OF PRESENT ILLNESS
73 y/o F with PMHx of OA, HTN, gastroparesis, peripheral neuropathy, multiple prior thoracolumbar spine surgeries for congenital scoliosis done >[place] years ago [place] of [place] w/ removal of hardware (1991) and recent fall with T11-T12 fracture, s/p thoracolumbar posterior fusion for t spine fx with plastics closure on  9/29 with Dr. Stroud and Dr. Funk, c/b post op csf leak, new onset paroxysmal Afib with RVR, increased post op pain and drainage from incision site, pleural effusions, symptomatic orthostatic hypotension, urinary retention, post op anemia, and CP with spontaneous resolution. Patient was sent to [facility] rehab on 10/17, c/b AMS, sepsis with high fever, rigors, tachycardia on 10/20 found to have MSSA bacteremia with right upper lobe infiltrate, and UTI. CT spine no report of collection. Urine +ve E coli, transferred back to [facility] on 10/23 for MICHAEL, advanced spine imaging, and further management, found to have paraspinal abscess on MR imaging, S/p paraspinal collection aspiration on 10/25, culture growing gram positive cocci in pairs, with rare staph aureus. Neurosurgery advised no acute surgical revision necessary, repeat BCx negative. To continue long-term IV cefazolin via PICC until 12/12/24 (6 wks) with eventual transition to PO Duricef long term/indefinitely as per ID. MICHAEL was negative for valvular vegetations but did reveal incidental PFO. Hospital course significant for recurrent fevers, A fib (resolved) with source control and electrolyte correction, initiated on DOAc given elevated [id number]. Patient optimized and was evaluated by PM&R and therapy for functional deficits, gait/ADL impairments and acute rehabilitation was recommended. Patient was cleared for discharge to [facility] IRU on 10/30/24.   Mrs. Haley French is a 74-year-old female patient with past medical history of OA, HTN, gastroparesis, peripheral neuropathy, multiple prior thoracolumbar spine surgeries for congenital scoliosis done >10 years ago out of state w/ removal of hardware (1991) who presented to [facility] on 9/28/24 with back pain and left hip pain s/p mechanical fall. CT imaging was performed and reported a T12 three column spine fracture-malalignment with fracture extending to the adjacent right 12th posterior rib and left T12-L1 facet joint and acute, displaced fracture of the left eighth lateral rib. Chronic appearing bilateral rib deformities. Surgical management was recommended and she is S/P T9-L3 open fusion with PSO/lag screw partial corpectomy/interbody by Dr. Stroud, Neurosurgeon with complex Plastic Surgery closure by Dr. Elias on 9/29/24. Post op course c/b CSK leak s/p repair, new onset paroxsymal Afib with RVR,  increased pain and drainage from surgical incision, development of pleural effusions, symptomatic orthostatic hypotension, urinary retention, post op anemia, and chest pain with spontaneous resolution. Cardiac work up negative. Patient was evaluated by PM&R and therapy for functional deficits, gait/ADL impairments and acute rehabilitation was recommended. Patient was cleared for discharge to [facility] IRF on 10/16/24.    Her admission was remarkable for a developing a fever of 103 with tachycardia and subjective fever/chills. Sepsis w/up was initiated and found to have small right upper lobe infiltrate, UTI, and MSSA bacteremia. CTH negative. Hospitalist and ID were consulted and per G+ bacteremia guidelines was recommended completing infectious workup, which includes MICHAEL which is not available at [facility]. Patient also required PICC line placement for long term ABx and a transfer request initiated from [place] to [facility] for MICHAEL and completion of bacteremia workup/treatment. All other medical co-morbidities were stable. Patient was deemed medically stable for discharge to [facility] medicine on 10/21/24 and was subsequently transferred on 10/23/24 for MICHAEL, advanced spine imaging, and further management. She was found to have a paraspinal abscess on MR imaging and is s/p paraspinal collection aspiration on 10/25/24. Culture grew gram positive cocci in pairs, with rare staph aureus. Neurosurgery advised and no acute surgical revision was deemed necessary. Repeat BCx was negative. Recommendation to continue long-term IV cefazolin via PICC until 12/12/24 (6 wks) with eventual transition to PO Duricef long term/indefinitely per ID. MICHAEL was negative for valvular vegetations but did reveal incidental PFO. Hospital course was significant for recurrent fevers, A fib (resolved) with source control and electrolyte correction, initiated on DOAc given elevated [id number]. Patient was evaluated by PM&R and therapy for functional deficits, gait/ADL impairments and acute rehabilitation was recommended. Patient was cleared for discharge to [facility] IRF on 10/30/24.

## 2024-10-30 NOTE — DISCHARGE NOTE NURSING/CASE MANAGEMENT/SOCIAL WORK - PATIENT PORTAL LINK FT
You can access the FollowMyHealth Patient Portal offered by [facility] by registering at the following website: [email or web address]. By joining [employer]’s FollowMyHealth portal, you will also be able to view your health information using other applications (apps) compatible with our system.

## 2024-10-30 NOTE — PROGRESS NOTE ADULT - PROBLEM SELECTOR PROBLEM 6
SILVANA (obstructive sleep apnea)

## 2024-10-30 NOTE — H&P ADULT - NSHPREVIEWOFSYSTEMS_GEN_ALL_CORE
REVIEW OF SYSTEMS:    CONSTITUTIONAL: Denies weakness, fevers or chills  EYES/ENT: Denies visual changes;  No vertigo or throat pain  NECK: Denies pain or stiffness  RESPIRATORY: Denies cough, wheezing, shortness of breath  CARDIOVASCULAR: Denies chest pain or palpitations  GASTROINTESTINAL: Denies abdominal or epigastric pain, nausea, vomiting, diarrhea or constipation.  GENITOURINARY: Denies dysuria, frequency or hematuria  NEUROLOGICAL: Denies sensory changes aside from chronic diminished sensation in LEs. Denies weakness.  SKIN: Denies itching, rashes CONSTITUTIONAL: Denies weakness, fevers or chills  EYES/ENT: Denies visual changes;  No vertigo or throat pain  NECK: Denies pain or stiffness  RESPIRATORY: Denies cough, wheezing, shortness of breath  CARDIOVASCULAR: Denies chest pain or palpitations  GASTROINTESTINAL: Denies abdominal or epigastric pain, nausea, vomiting, diarrhea or constipation.  GENITOURINARY: Denies dysuria, frequency or hematuria  NEUROLOGICAL: Denies sensory changes aside from chronic diminished sensation in LEs. Denies weakness.  SKIN: Denies itching, rashes

## 2024-10-30 NOTE — PROGRESS NOTE ADULT - PROBLEM SELECTOR PROBLEM 4
Chronic hypertension

## 2024-10-30 NOTE — PROGRESS NOTE ADULT - PROBLEM SELECTOR PROBLEM 8
Seasonal allergies

## 2024-10-30 NOTE — H&P ADULT - NSHPSOCIALHISTORY_GEN_ALL_CORE
SOCIAL HISTORY  Smoking - Denies  EtOH - Denies  Drugs - Denies    FUNCTIONAL HISTORY  Patient lives with her son in an elevator accessible apartment building with no stairs to enter. PTA, pt. was independent in ADLs & mobility with RW.    CURRENT FUNCTIONAL STATUS  - Bed Mobility: Min A; 1PA  - Transfers: Mod A; 1PA; WBAT; RW  - Gait: Mod A; 1PA; WBAT; bed to chair 6 steps with RW and TLSO brace    - ADLs:  -Upper Body Dressing: Mod A; 1AP; with TLSO brace  -Lower Body Dressing: Max A; 1PA

## 2024-10-30 NOTE — PROGRESS NOTE ADULT - PROBLEM SELECTOR PLAN 2
- 10/7 cardiology consult for Afib, patient converted back to SR. if AF is persistent will need AC  - 10/25 ECG and exam notable for irregular rate and rhythm  - Increase Metoprolol to 20 BID for rate control  - Continue to monitor on telemetry  - c/w heparin gtt 3500mg IV q6 for anticoagulation  - Consulted cardiology, rec to replete electrolytes and treat sepsis before addressing Afib, f/u reccs  - Consulted outpt cardiology Dr. Copeland, f/u reccs. Multiple messages left for Dr. Copeland - 10/7 cardiology consult for Afib, patient converted back to SR. if AF is persistent will need AC  - 10/25 ECG and exam notable for irregular rate and rhythm  - Increase Metoprolol to 20 BID for rate control  - Continue to monitor on telemetry  - c/w heparin gtt for anticoagulation  - Consulted cardiology, rec to replete electrolytes and treat sepsis before addressing Afib, f/u reccs  - Consulted outpt cardiology Dr. Copeland, f/u reccs. Multiple messages left for Dr. Copeland

## 2024-10-30 NOTE — PROGRESS NOTE ADULT - PROVIDER SPECIALTY LIST ADULT
Infectious Disease
Neurosurgery
Internal Medicine
Internal Medicine
Neurosurgery
Neurosurgery
Infectious Disease
Neurosurgery
Internal Medicine

## 2024-10-30 NOTE — PROGRESS NOTE ADULT - ASSESSMENT
Patient is a 74-year-old female patient with past medical history of OA, HTN, gastroparesis, peripheral neuropathy, multiple prior thoracolumbar spine surgeries for congenital scoliosis [place] >[place] w/ removal of hardware (1991) who presented to [facility] on 9/28/24 with back pain and left hip pain s/p mechanical fall. CT imaging was performed and reported a T12 three column spine fracture-malalignment with fracture extending to the adjacent right 12th posterior rib and left T12-L1 facet joint and acute, displaced fracture of the left eighth lateral rib. Chronic appearing bilateral rib deformities. Surgical management was recommended and she is S/P T9-L3 open fusion with PSO/lag screw partial corpectomy/interbody by Dr. Stroud, Neurosurgeon with complex Plastic Surgery closure by Dr. Elias on 9/29/24. Post op course c/b CSF leak s/p repair, new onset paroxsymal Afib with RVR,  increased pain and drainage from surgical incision, development of pleural effusions, symptomatic orthostatic hypotension, urinary retention, post op anemia, and chest pain with spontaneous resolution. Cardiac work up negative. Patient was evaluated by PM&R and therapy for functional deficits, gait/ADL impairments and acute rehabilitation was recommended. Patient was cleared for discharge to [facility] IRF on 10/16/24.     Rehab course complicated by RRT on 10/20 for fever of 103.9 f, tachycardia, and AMS. Infectious workup following RRT notable for leukocytosis to 10.58, lactic acid 2.6, U/A with positive nitrites, large LE, 25 WBCs, urine culture growing klebsiella aerogenes and e. coli, and 10/20 blood cultures x 2 with MSSA. Repeat blood cultures on 10/21 with NGTD. CT head without acute pathology. CT T and L spine: Unremarkable CT scan of the thoracic spine.   No vertebral fracture is recognized. ID was consulted while at [facility] who recommended Transfer request initiated from [place] to [facility] for MICHAEL and completion of bacteremia workup/treatment.      Patient has had unchanged persistent back pain since surgery. Other than spinal hardware, patient reports she had a right knee replacement this year, two hip replacements (one 2 years ago and one 20 years ago), and right shoulder replacement 10 years ago. States none of these sites have been bothering her. Denies any prosthetic heart valves or murmurs. Clincal exam notable for fluctuance over lower L spine.  MRI imaging performed now showing 28.0 x 7.0 x 3.0 (CC x TV x AP) rim-enhancing heterogeneous fluid collection in the dorsal paraspinal through superficial subcutaneous tissues at the T7-L4 levels. Now s/p IR aspiration on 10/25.     #MSSA bacteremia  #recent spinal surgery with hardware  #presence of other prosthetic joints   #rim enhancing fluid collection in T7-L4 region, s/p drain placement      10/28: Aspiration with MSSA. Beta-2-transferrin pending. Patient doing well overall. F/U Blood cx NTD. For PICC today.   10/29: No concern of uncontrolled infection on exam. The risks, benefits and alternatives to outpatient antibiotics were discussed with the patient in clear layman's terms. The strengths and weaknesses of the various approaches were addressed. It is not possible to review every single potential side effect or scenario. Discussion of adverse events, including but were not limited to the following--  Related the use of a PICC line or other IV catheter: breakage/catheter dysfunction, inadvertent removal, blood clot, infection, irritation related to the dressing. The overall risk of complications related to outpatient IV antibiotic administration resulting in cessation of therapy is reported to be 3-10%. In my personal experience it appears to be 1-3%.  Related to the use of antibiotics in general the following side effects are common: rash, allergy, GI upset, diarrhea/colitis (including C. difficile), kidney dysfunction, liver abnormalities, abnormal blood counts.  Laboratory data will need to be monitored on an ongoing basis. The types of labs monitored will depend on the specific drug selected. Such monitoring typically occurs once weekly, and sometimes more often, as the drug or clinical circumstances dictate.  The patient voiced understanding and agreed. All questions were answered to the best of my ability.  10/30: No concern of uncontrolled infection on exam. No objection to transfer to rehab.     Suggestions--  Continue IV Cefazolin  PFO- as per primary  Await rehab placement  Repeat imaging/drain management as per surgery and IR    Drug: Cefazolin  Dose: 2g  Route: IVPB  Frequency: q8H  Stop date: would treat IV 6 weeks from aspiration i.e. last day of IV abx is 12/12/24.  After IV antibiotics completed would begin Duricef 500mg PO Q12H-- long term and perhaps indefinitely  Labs & Other Monitoring: at rehab weekly CBC Diff BMP ESR CRP. While physicians at rehab will need to be responsible for monitoring these, happy to assist as needed. Labs, orders, and other correspondence should be emailed to [email or web address]  Follow Up: nonurgently in office    Please recall as needed.    Kaushal Waterman MD  Attending Physician  [facility]  Division of Infectious Diseases  [phone]

## 2024-10-30 NOTE — PROGRESS NOTE ADULT - PROBLEM SELECTOR PLAN 1
Patient presents with sepsis with high fever, rigors, tachycardia on 10/20 found to have MSSA bacteremia.  - 10/20 Bcx MSSA, 10/21 no growth at 24hrs  - CXR with old rib fracture and small RUL infiltrate  - 10/20 +UA for Ecoli, follow urine culture  - lactic acidosis resolved, lactate 1.6 10/23  - 10/23 Na 134 K 3.3 Phos 1.5  - MRI: rim-enhancing heterogeneous fluid collection suspicious for an abscess  - TTE: no evidence of vegetations    Plan  - Neurosurg consulted and following, no neurosurgical contraindication to drain fluid w IR today in addition to sending aspirate for cultures, f/u reccs  - ID consulted, requests culture and gram stain fluid  - IR consulted, approved for paraspinal collection aspiration 10/25   - MICHAEL scheduled for 10/25, NPO at midnight  - Repeat daily Bcx  - Continue to monitor electrolytes  - Monitor WBC and fever curve  - s/p abscess drainage, culture growing GPC in pairs   - c/w cefazolin 2000mg IV q8 Patient presents with sepsis with high fever, rigors, tachycardia on 10/20 found to have MSSA bacteremia.  - 10/20 Bcx MSSA, 10/21 no growth at 24hrs  - CXR with old rib fracture and small RUL infiltrate  - 10/20 +UA for Ecoli, follow urine culture  - lactic acidosis resolved, lactate 1.6 10/23  - 10/23 Na 134 K 3.3 Phos 1.5  - MRI: rim-enhancing heterogeneous fluid collection suspicious for an abscess  - TTE: no evidence of vegetations    Plan  - Neurosurg consulted and following, no neurosurgical contraindication to drain fluid w IR today in addition to sending aspirate for cultures, f/u reccs  - ID consulted, requests culture and gram stain fluid  - IR consulted, approved for paraspinal collection aspiration 10/25   - MICHAEL scheduled for 10/25, NPO at midnight  - Repeat daily Bcx  - Continue to monitor electrolytes  - Monitor WBC and fever curve  - s/p abscess drainage, culture growing GPC in pairs   - c/w cefazolin 2000mg IV q8, to be given PO abx after completion on 12/12/2024

## 2024-10-30 NOTE — PROGRESS NOTE ADULT - SUBJECTIVE AND OBJECTIVE BOX
[facility]  Division of Infectious Diseases  [phone]    Name: VIRGINIA HUFFMAN  Age: 74y  Gender: Female  MRN: [id number]    Interval History--  Notes reviewed.     Past Medical History--  H/O seasonal allergies    History of pulmonary embolism    Restless leg syndrome    GERD (gastroesophageal reflux disease)    HTN (hypertension)    OA (osteoarthritis)    Fungal infection of skin    H/O scoliosis    Essential hypertension    Depression    Osteoporosis    Right hip pain    2019 novel coronavirus disease (COVID-19)    Hiatal hernia    History of chronic pain    Peripheral neuropathy    S/P repair of paraesophageal hernia    S/P spinal fusion    Scoliosis    S/P spinal surgery    Removal of pin, plate, abigail, or screw    S/P hysterectomy    S/P hip replacement    S/P shoulder surgery    S/P dilatation and curettage    H/O arthroscopy of knee    H/O total knee replacement, right    S/P cataract surgery    History of bilateral hip replacements        For details regarding the patient's social history, family history, and other miscellaneous elements, please refer the initial infectious diseases consultation and/or the admitting history and physical examination for this admission.    Allergies    Dilantin (Urticaria)  penicillins (Urticaria)    Intolerances    erythromycin (Stomach Upset; Vomiting; Nausea)      Medications--  Antibiotics:  ceFAZolin   IVPB 2000 milliGRAM(s) IV Intermittent every 8 hours  erythromycin    ethylsuccinate Suspension 40 mG/mL 128 milliGRAM(s) Oral every 12 hours    Immunologic:    Other:  acetaminophen     Tablet ..  apixaban  atorvastatin  benzocaine/menthol Lozenge PRN  cyclobenzaprine  DULoxetine  ergocalciferol  HYDROmorphone   Tablet PRN  losartan  melatonin  metoprolol tartrate  montelukast  multivitamin  polyethylene glycol 3350  pramipexole  pregabalin  senna  Vonoprazan (Voquenza) Tablet 10 milliGRAM(s)      Review of Systems--  A 10-point review of systems was obtained.     Pertinent positives and negatives--  Constitutional: No fevers. No Chills. No Rigors.   Cardiovascular: No chest pain. No palpitations.  Respiratory: No shortness of breath. No cough.  Gastrointestinal: No nausea or vomiting. No diarrhea or constipation.   Psychiatric: Pleasant. Appropriate affect.    Review of systems otherwise negative except as previously noted.    Physical Examination--  Vital Signs: T(F): 98.6 (10-30-24 @ 04:12), Max: 99.5 (10-29-24 @ 11:16)  HR: 95 (10-30-24 @ 04:12)  BP: 148/75 (10-30-24 @ 04:12)  RR: 18 (10-30-24 @ 04:12)  SpO2: 92% (10-30-24 @ 04:12)  Wt(kg): --  General: Nontoxic-appearing Female in no acute distress.  HEENT: AT/NC. PERRL. EOMI. Anicteric. Conjunctiva pink and moist. Oropharynx clear. Dentition fair.  Neck: Not rigid. No sense of mass.  Nodes: None palpable.  Lungs: Clear bilaterally without rales, wheezing or rhonchi  Heart: Regular rate and rhythm. No Murmur. No rub. No gallop. No palpable thrill.  Abdomen: Bowel sounds present and normoactive. Soft. Nondistended. Nontender. No sense of mass. No organomegaly.  Back: No spinal tenderness. No costovertebral angle tenderness.   Extremities: No cyanosis or clubbing. No edema.   Skin: Warm. Dry. Good turgor. No rash. No vasculitic stigmata.  Psychiatric: Appropriate affect and mood for situation.         Laboratory Studies--  CBC                        8.6    8.20  )-----------( 465      ( 30 Oct 2024 06:32 )             28.9       Chemistries          Culture Data    Culture - Body Fluid with Gram Stain (collected 25 Oct 2024 13:46)  Source: Body Fluid  Gram Stain (25 Oct 2024 21:03):    polymorphonuclear leukocytes seen    Gram positive cocci in pairs seen    by cytocentrifuge  Preliminary Report (26 Oct 2024 21:01):    Rare Staphylococcus aureus  Organism: Staphylococcus aureus (27 Oct 2024 15:13)  Organism: Staphylococcus aureus (27 Oct 2024 15:13)    Culture - Blood (collected 23 Oct 2024 09:55)  Source: .Blood BLOOD  Final Report (28 Oct 2024 16:00):    No growth at 5 days    Culture - Blood (collected 23 Oct 2024 09:40)  Source: .Blood BLOOD  Final Report (28 Oct 2024 16:00):    No growth at 5 days             [facility]  Division of Infectious Diseases  [phone]    Name: VIRGINIA HUFFMNA  Age: 74y  Gender: Female  MRN: [id number]    Interval History--  Notes reviewed. Seen earlier today. Feeling ok. No fevers, chills, or rigors. Pain control suboptimal. Awaiting rehab placement.      Past Medical History--  H/O seasonal allergies    History of pulmonary embolism    Restless leg syndrome    GERD (gastroesophageal reflux disease)    HTN (hypertension)    OA (osteoarthritis)    Fungal infection of skin    H/O scoliosis    Essential hypertension    Depression    Osteoporosis    Right hip pain    2019 novel coronavirus disease (COVID-19)    Hiatal hernia    History of chronic pain    Peripheral neuropathy    S/P repair of paraesophageal hernia    S/P spinal fusion    Scoliosis    S/P spinal surgery    Removal of pin, plate, abigail, or screw    S/P hysterectomy    S/P hip replacement    S/P shoulder surgery    S/P dilatation and curettage    H/O arthroscopy of knee    H/O total knee replacement, right    S/P cataract surgery    History of bilateral hip replacements        For details regarding the patient's social history, family history, and other miscellaneous elements, please refer the initial infectious diseases consultation and/or the admitting history and physical examination for this admission.    Allergies    Dilantin (Urticaria)  penicillins (Urticaria)    Intolerances    erythromycin (Stomach Upset; Vomiting; Nausea)      Medications--  Antibiotics:  ceFAZolin   IVPB 2000 milliGRAM(s) IV Intermittent every 8 hours  erythromycin    ethylsuccinate Suspension 40 mG/mL 128 milliGRAM(s) Oral every 12 hours    Immunologic:    Other:  acetaminophen     Tablet ..  apixaban  atorvastatin  benzocaine/menthol Lozenge PRN  cyclobenzaprine  DULoxetine  ergocalciferol  HYDROmorphone   Tablet PRN  losartan  melatonin  metoprolol tartrate  montelukast  multivitamin  polyethylene glycol 3350  pramipexole  pregabalin  senna  Vonoprazan (Voquenza) Tablet 10 milliGRAM(s)      Review of Systems--  A 10-point review of systems was obtained.   Review of systems otherwise negative except as previously noted.    Physical Examination--  Vital Signs: T(F): 98.6 (10-30-24 @ 04:12), Max: 99.5 (10-29-24 @ 11:16)  HR: 95 (10-30-24 @ 04:12)  BP: 148/75 (10-30-24 @ 04:12)  RR: 18 (10-30-24 @ 04:12)  SpO2: 92% (10-30-24 @ 04:12)  Wt(kg): --  General: Nontoxic-appearing Female in no acute distress.  HEENT: AT/NC. Anicteric. Conjunctiva pink and moist. Oropharynx clear.   Neck: Not rigid. No sense of mass.  Nodes: None palpable.  Lungs: Clear bilaterally  Heart: Regular rate and rhythm.   Abdomen: Bowel sounds present and normoactive. Soft. Nondistended. Nontender.  Back: Drain scant orange turbid fluid  Extremities: No cyanosis or clubbing. No edema. PICC CDI RUE  Skin: Warm. Dry. Good turgor. No rash. No vasculitic stigmata.  Psychiatric: Appropriate affect and mood for situation.       Laboratory Studies--  CBC                        8.6    8.20  )-----------( 465      ( 30 Oct 2024 06:32 )             28.9       Chemistries          Culture Data    Culture - Body Fluid with Gram Stain (collected 25 Oct 2024 13:46)  Source: Body Fluid  Gram Stain (25 Oct 2024 21:03):    polymorphonuclear leukocytes seen    Gram positive cocci in pairs seen    by cytocentrifuge  Preliminary Report (26 Oct 2024 21:01):    Rare Staphylococcus aureus  Organism: Staphylococcus aureus (27 Oct 2024 15:13)  Organism: Staphylococcus aureus (27 Oct 2024 15:13)    Culture - Blood (collected 23 Oct 2024 09:55)  Source: .Blood BLOOD  Final Report (28 Oct 2024 16:00):    No growth at 5 days    Culture - Blood (collected 23 Oct 2024 09:40)  Source: .Blood BLOOD  Final Report (28 Oct 2024 16:00):    No growth at 5 days

## 2024-10-31 LAB
ALBUMIN SERPL ELPH-MCNC: 2.7 G/DL — LOW (ref 3.3–5)
ALP SERPL-CCNC: 227 U/L — HIGH (ref 40–120)
ALT FLD-CCNC: 32 U/L — SIGNIFICANT CHANGE UP (ref 10–45)
ANION GAP SERPL CALC-SCNC: 10 MMOL/L — SIGNIFICANT CHANGE UP (ref 5–17)
APPEARANCE UR: CLEAR — SIGNIFICANT CHANGE UP
AST SERPL-CCNC: 33 U/L — SIGNIFICANT CHANGE UP (ref 10–40)
BACTERIA # UR AUTO: ABNORMAL /HPF
BASOPHILS # BLD AUTO: 0.04 K/UL — SIGNIFICANT CHANGE UP (ref 0–0.2)
BASOPHILS NFR BLD AUTO: 0.6 % — SIGNIFICANT CHANGE UP (ref 0–2)
BILIRUB SERPL-MCNC: 0.4 MG/DL — SIGNIFICANT CHANGE UP (ref 0.2–1.2)
BILIRUB UR-MCNC: NEGATIVE — SIGNIFICANT CHANGE UP
BUN SERPL-MCNC: 23 MG/DL — SIGNIFICANT CHANGE UP (ref 7–23)
CALCIUM SERPL-MCNC: 9.7 MG/DL — SIGNIFICANT CHANGE UP (ref 8.4–10.5)
CHLORIDE SERPL-SCNC: 100 MMOL/L — SIGNIFICANT CHANGE UP (ref 96–108)
CO2 SERPL-SCNC: 27 MMOL/L — SIGNIFICANT CHANGE UP (ref 22–31)
COLOR SPEC: YELLOW — SIGNIFICANT CHANGE UP
CREAT SERPL-MCNC: 0.8 MG/DL — SIGNIFICANT CHANGE UP (ref 0.5–1.3)
CRP SERPL-MCNC: 56 MG/L — HIGH
DIFF PNL FLD: NEGATIVE — SIGNIFICANT CHANGE UP
EGFR: 77 ML/MIN/1.73M2 — SIGNIFICANT CHANGE UP
EOSINOPHIL # BLD AUTO: 0.13 K/UL — SIGNIFICANT CHANGE UP (ref 0–0.5)
EOSINOPHIL NFR BLD AUTO: 1.9 % — SIGNIFICANT CHANGE UP (ref 0–6)
EPI CELLS # UR: SIGNIFICANT CHANGE UP
ERYTHROCYTE [SEDIMENTATION RATE] IN BLOOD: 95 MM/HR — HIGH (ref 0–20)
GLUCOSE SERPL-MCNC: 111 MG/DL — HIGH (ref 70–99)
GLUCOSE UR QL: NEGATIVE MG/DL — SIGNIFICANT CHANGE UP
HCT VFR BLD CALC: 30.3 % — LOW (ref 34.5–45)
HGB BLD-MCNC: 9.4 G/DL — LOW (ref 11.5–15.5)
IMM GRANULOCYTES NFR BLD AUTO: 1.5 % — HIGH (ref 0–0.9)
KETONES UR-MCNC: NEGATIVE MG/DL — SIGNIFICANT CHANGE UP
LEUKOCYTE ESTERASE UR-ACNC: ABNORMAL
LYMPHOCYTES # BLD AUTO: 1.5 K/UL — SIGNIFICANT CHANGE UP (ref 1–3.3)
LYMPHOCYTES # BLD AUTO: 22 % — SIGNIFICANT CHANGE UP (ref 13–44)
MCHC RBC-ENTMCNC: 27.2 PG — SIGNIFICANT CHANGE UP (ref 27–34)
MCHC RBC-ENTMCNC: 31 G/DL — LOW (ref 32–36)
MCV RBC AUTO: 87.8 FL — SIGNIFICANT CHANGE UP (ref 80–100)
MONOCYTES # BLD AUTO: 0.51 K/UL — SIGNIFICANT CHANGE UP (ref 0–0.9)
MONOCYTES NFR BLD AUTO: 7.5 % — SIGNIFICANT CHANGE UP (ref 2–14)
NEUTROPHILS # BLD AUTO: 4.53 K/UL — SIGNIFICANT CHANGE UP (ref 1.8–7.4)
NEUTROPHILS NFR BLD AUTO: 66.5 % — SIGNIFICANT CHANGE UP (ref 43–77)
NITRITE UR-MCNC: NEGATIVE — SIGNIFICANT CHANGE UP
NRBC # BLD: 0 /100 WBCS — SIGNIFICANT CHANGE UP (ref 0–0)
PH UR: 6 — SIGNIFICANT CHANGE UP (ref 5–8)
PLATELET # BLD AUTO: 467 K/UL — HIGH (ref 150–400)
POTASSIUM SERPL-MCNC: 4.5 MMOL/L — SIGNIFICANT CHANGE UP (ref 3.5–5.3)
POTASSIUM SERPL-SCNC: 4.5 MMOL/L — SIGNIFICANT CHANGE UP (ref 3.5–5.3)
PROT SERPL-MCNC: 6.8 G/DL — SIGNIFICANT CHANGE UP (ref 6–8.3)
PROT UR-MCNC: NEGATIVE MG/DL — SIGNIFICANT CHANGE UP
RBC # BLD: 3.45 M/UL — LOW (ref 3.8–5.2)
RBC # FLD: 16 % — HIGH (ref 10.3–14.5)
RBC CASTS # UR COMP ASSIST: 0 /HPF — SIGNIFICANT CHANGE UP (ref 0–4)
SODIUM SERPL-SCNC: 137 MMOL/L — SIGNIFICANT CHANGE UP (ref 135–145)
SP GR SPEC: 1.01 — SIGNIFICANT CHANGE UP (ref 1–1.03)
UROBILINOGEN FLD QL: 0.2 MG/DL — SIGNIFICANT CHANGE UP (ref 0.2–1)
WBC # BLD: 6.81 K/UL — SIGNIFICANT CHANGE UP (ref 3.8–10.5)
WBC # FLD AUTO: 6.81 K/UL — SIGNIFICANT CHANGE UP (ref 3.8–10.5)
WBC UR QL: 25 /HPF — HIGH (ref 0–5)

## 2024-10-31 PROCEDURE — 99223 1ST HOSP IP/OBS HIGH 75: CPT

## 2024-10-31 PROCEDURE — 93010 ELECTROCARDIOGRAM REPORT: CPT

## 2024-10-31 PROCEDURE — 99223 1ST HOSP IP/OBS HIGH 75: CPT | Mod: GC

## 2024-10-31 RX ORDER — CHLORHEXIDINE GLUCONATE 1.2 MG/ML
1 RINSE ORAL DAILY
Refills: 0 | Status: DISCONTINUED | OUTPATIENT
Start: 2024-10-31 | End: 2024-11-20

## 2024-10-31 RX ORDER — METOPROLOL TARTRATE 100 MG/1
50 TABLET, FILM COATED ORAL
Refills: 0 | Status: DISCONTINUED | OUTPATIENT
Start: 2024-10-31 | End: 2024-11-16

## 2024-10-31 RX ADMIN — HYDROMORPHONE HYDROCHLORIDE 4 MILLIGRAM(S): 2 TABLET ORAL at 09:48

## 2024-10-31 RX ADMIN — MONTELUKAST SODIUM 10 MILLIGRAM(S): 10 TABLET ORAL at 12:13

## 2024-10-31 RX ADMIN — Medication 10 MILLIGRAM(S): at 06:08

## 2024-10-31 RX ADMIN — PREGABALIN 100 MILLIGRAM(S): 75 CAPSULE ORAL at 13:55

## 2024-10-31 RX ADMIN — PREGABALIN 100 MILLIGRAM(S): 75 CAPSULE ORAL at 22:54

## 2024-10-31 RX ADMIN — Medication 20 MILLIGRAM(S): at 12:14

## 2024-10-31 RX ADMIN — Medication 10 MILLIGRAM(S): at 22:54

## 2024-10-31 RX ADMIN — Medication 100 MILLIGRAM(S): at 22:53

## 2024-10-31 RX ADMIN — PREGABALIN 100 MILLIGRAM(S): 75 CAPSULE ORAL at 06:08

## 2024-10-31 RX ADMIN — HYDROMORPHONE HYDROCHLORIDE 4 MILLIGRAM(S): 2 TABLET ORAL at 17:08

## 2024-10-31 RX ADMIN — PRAMIPEXOLE 0.75 MILLIGRAM(S): 1.5 TABLET, EXTENDED RELEASE ORAL at 19:51

## 2024-10-31 RX ADMIN — METOPROLOL TARTRATE 25 MILLIGRAM(S): 100 TABLET, FILM COATED ORAL at 06:09

## 2024-10-31 RX ADMIN — ACETAMINOPHEN 500MG 975 MILLIGRAM(S): 500 TABLET, COATED ORAL at 06:10

## 2024-10-31 RX ADMIN — CHLORHEXIDINE GLUCONATE 1 APPLICATION(S): 1.2 RINSE ORAL at 12:14

## 2024-10-31 RX ADMIN — ACETAMINOPHEN 500MG 975 MILLIGRAM(S): 500 TABLET, COATED ORAL at 14:50

## 2024-10-31 RX ADMIN — Medication 100 MILLIGRAM(S): at 06:13

## 2024-10-31 RX ADMIN — POLYETHYLENE GLYCOL 3350 17 GRAM(S): 17 POWDER, FOR SOLUTION ORAL at 06:10

## 2024-10-31 RX ADMIN — ACETAMINOPHEN 500MG 975 MILLIGRAM(S): 500 TABLET, COATED ORAL at 13:54

## 2024-10-31 RX ADMIN — LOSARTAN POTASSIUM 100 MILLIGRAM(S): 100 TABLET, FILM COATED ORAL at 06:08

## 2024-10-31 RX ADMIN — Medication 128 MILLIGRAM(S): at 17:08

## 2024-10-31 RX ADMIN — APIXABAN 5 MILLIGRAM(S): 2.5 TABLET, FILM COATED ORAL at 17:08

## 2024-10-31 RX ADMIN — Medication 100 MILLIGRAM(S): at 15:16

## 2024-10-31 RX ADMIN — APIXABAN 5 MILLIGRAM(S): 2.5 TABLET, FILM COATED ORAL at 06:09

## 2024-10-31 RX ADMIN — Medication 1 TABLET(S): at 12:13

## 2024-10-31 RX ADMIN — ACETAMINOPHEN 500MG 975 MILLIGRAM(S): 500 TABLET, COATED ORAL at 22:54

## 2024-10-31 RX ADMIN — HYDROMORPHONE HYDROCHLORIDE 4 MILLIGRAM(S): 2 TABLET ORAL at 08:46

## 2024-10-31 RX ADMIN — HYDROMORPHONE HYDROCHLORIDE 4 MILLIGRAM(S): 2 TABLET ORAL at 18:09

## 2024-10-31 RX ADMIN — Medication 2 TABLET(S): at 22:55

## 2024-10-31 RX ADMIN — Medication 10 MILLIGRAM(S): at 13:55

## 2024-10-31 RX ADMIN — Medication 40 MILLIGRAM(S): at 22:54

## 2024-10-31 NOTE — DIETITIAN INITIAL EVALUATION ADULT - NS FNS DIET ORDER
on DASH-TLC Diet w/ Thin Liquids (IDDSI Level 0)  Declines Therapeutic Diet Despite PMHx & Education   Recommend Change Diet to Regular Diet   Education Provided on Proper Nutrition

## 2024-10-31 NOTE — DIETITIAN INITIAL EVALUATION ADULT - OTHER INFO
Initial Nutrition Assessment   74yr Old Female   Denies Food Allergy/Intolerance  Tolerates Diet Well - No Chewing/Swallowing Complications (Per Patient)  Surgical Incision on Backx2 and Midline Spine & No Pressure Ulcers (as Per Nursing Flow Sheets)  No Edema Noted (as Per Nursing Flow Sheets)  No Recent Nausea/Vomiting/Diarrhea/Constipation (as Per Patient)

## 2024-10-31 NOTE — DIETITIAN INITIAL EVALUATION ADULT - ENERGY INTAKE
October 1, 2020      Susan Call  [address]  [place] [address]          Dear Ms. Call:    Jeff Chambers MD has ordered a colonoscopy for you and we would like to schedule that procedure. Our attempts to reach you by phone have been unsuccessful.    Please call Lorena [phone] at your earliest convenience. Let the  know that your paperwork is started, and that you are calling to arrange a date and time for the procedure.      If you have any questions or concerns, we would be more than happy to discuss them with you. We look forward to assisting you with your health care needs.      Sincerely,  Lorena [phone]  Surgery Scheduler for Jeff Chambers MD  [facility] Pre-Admit Department   Adequate (%) States Good PO Intake/Appetite Prior to Admission to Hospital (Per Patient)

## 2024-10-31 NOTE — DIETITIAN INITIAL EVALUATION ADULT - PERTINENT MEDS FT
MEDICATIONS  (STANDING):  acetaminophen     Tablet .. 975 milliGRAM(s) Oral every 8 hours  apixaban 5 milliGRAM(s) Oral every 12 hours  atorvastatin 40 milliGRAM(s) Oral at bedtime  ceFAZolin   IVPB 2000 milliGRAM(s) IV Intermittent every 8 hours  chlorhexidine 2% Cloths 1 Application(s) Topical daily  cyclobenzaprine 10 milliGRAM(s) Oral three times a day  DULoxetine 20 milliGRAM(s) Oral daily  ergocalciferol 69943 Unit(s) Oral <User Schedule>  erythromycin    ethylsuccinate Suspension 40 mG/mL 128 milliGRAM(s) Oral every 12 hours  losartan 100 milliGRAM(s) Oral daily  metoprolol tartrate 25 milliGRAM(s) Oral two times a day  montelukast 10 milliGRAM(s) Oral daily  multivitamin 1 Tablet(s) Oral daily  polyethylene glycol 3350 17 Gram(s) Oral two times a day  pramipexole 0.75 milliGRAM(s) Oral <User Schedule>  pregabalin 100 milliGRAM(s) Oral every 8 hours  senna 2 Tablet(s) Oral at bedtime  Vonoprazan (Voquezna) 10mg 1 Tablet(s) 1 Tablet(s) Oral daily    MEDICATIONS  (PRN):  benzocaine/menthol Lozenge 1 Lozenge Oral every 3 hours PRN Mouth Sores  HYDROmorphone   Tablet 4 milliGRAM(s) Oral every 8 hours PRN Severe Pain (7 - 10)  melatonin 6 milliGRAM(s) Oral at bedtime PRN Insomnia

## 2024-10-31 NOTE — DIETITIAN INITIAL EVALUATION ADULT - ORAL INTAKE PTA/DIET HISTORY
Patient Does Not Follow Diet @Home But Recently Trying to Decrease Salt Consumption  Consumes 2 Meals a Day   Usually Cooks For Self  Doesn't Take Vitamin/Supplements @Home

## 2024-10-31 NOTE — DIETITIAN INITIAL EVALUATION ADULT - PERTINENT LABORATORY DATA
10-31    137  |  100  |  23  ----------------------------<  111[H]  4.5   |  27  |  0.80    Ca    9.7      31 Oct 2024 06:00    TPro  6.8  /  Alb  2.7[L]  /  TBili  0.4  /  DBili  x   /  AST  33  /  ALT  32  /  AlkPhos  227[H]  10-31  A1C with Estimated Average Glucose Result: 6.1 % (01-29-24 @ 00:38)

## 2024-10-31 NOTE — CONSULT NOTE ADULT - SUBJECTIVE AND OBJECTIVE BOX
Patient is a 74y old  Female who presents with a chief complaint of MSSA Bacteremia s/p thoracolumbar posterior fusion of T Spine (30 Oct 2024 13:05)    HPI, per admission H&P:  73 y/o F with PMHx of OA, HTN, gastroparesis, peripheral neuropathy, multiple prior thoracolumbar spine surgeries for congenital scoliosis [place]/ [place] of hardware (1991) and recent fall with T11-T12 fracture, s/p thoracolumbar posterior fusion for t spine fx with plastics closure on  9/29 with Dr. Stroud and Dr. Funk, c/b post op csf leak, new onset paroxysmal Afib with RVR, increased post op pain and drainage from incision site, pleural effusions, symptomatic orthostatic hypotension, urinary retention, post op anemia, and CP with spontaneous resolution. Patient was sent to [facility] rehab on 10/17, c/b AMS, sepsis with high fever, rigors, tachycardia on 10/20 found to have MSSA bacteremia with right upper lobe infiltrate, and UTI. CT spine no report of collection. Urine +ve E coli, transferred back to [facility] on 10/23 for MICHAEL, advanced spine imaging, and further management, found to have paraspinal abscess on MR imaging, S/p paraspinal collection aspiration on 10/25, culture growing gram positive cocci in pairs, with rare staph aureus. Neurosurgery advised no acute surgical revision necessary, repeat BCx negative. To continue long-term IV cefazolin via PICC until 12/12/24 (6 wks) with eventual transition to PO Duricef long term/indefinitely as per ID. MICHAEL was negative for valvular vegetations but did reveal incidental PFO. Hospital course significant for recurrent fevers, A fib (resolved) with source control and electrolyte correction, initiated on DOAc given elevated [id number]. Patient optimized and was evaluated by PM&R and therapy for functional deficits, gait/ADL impairments and acute rehabilitation was recommended. Patient was cleared for discharge to [facility] IRU on 10/30/24.   (30 Oct 2024 13:05)    Subjective 10/31  Patient seen and examined at bedside. Reviewed medical history and recent hospitalization with patient.   Noted to be tachycardic this am which improved with pain relief. Denies palpitations, nausea, vomiting.     PAST MEDICAL & SURGICAL HISTORY:  H/O seasonal allergies  History of pulmonary embolism developed after billings abigail surgery,1984 treated with coumadin 3 months, no issues after  Restless leg syndrome  GERD (gastroesophageal reflux disease)  OA (osteoarthritis)  Fungal infection of skin under breast  H/O scoliosis  Essential hypertension  Depression  Osteoporosis  Right hip pain  2019 novel coronavirus disease (COVID-19) Dec 2020- hospitalized for 3 days, did not require home O2, denies residual symptoms  Hiatal hernia  History of chronic pain  Peripheral neuropathy  S/P repair of paraesophageal hernia  2001  S/P spinal fusion L4-L5 1966  Scoliosis s/p placement of billinsg abigail x 2 1984  S/P spinal surgery x 2 1985, 1986  Removal of pin, plate, abigail, or screw 1991- removal of billings baigail  S/P hysterectomy 1982  S/P hip replacement left (2008)  S/P shoulder surgery right (2012)  S/P dilatation and curettage 1981  H/O arthroscopy of knee June 2021  S/P cataract surgery  History of bilateral hip replacements    SOCIAL HISTORY: Former smoker. Denies alcohol or drug use  FAMILY HISTORY: non-contributory     ALLERGIES:  Dilantin (Urticaria)  penicillins (Urticaria)  erythromycin (Stomach Upset; Vomiting; Nausea)    MEDICATIONS  (STANDING):  acetaminophen     Tablet .. 975 milliGRAM(s) Oral every 8 hours  apixaban 5 milliGRAM(s) Oral every 12 hours  atorvastatin 40 milliGRAM(s) Oral at bedtime  ceFAZolin   IVPB 2000 milliGRAM(s) IV Intermittent every 8 hours  chlorhexidine 2% Cloths 1 Application(s) Topical daily  cyclobenzaprine 10 milliGRAM(s) Oral three times a day  DULoxetine 20 milliGRAM(s) Oral daily  ergocalciferol 71804 Unit(s) Oral <User Schedule>  erythromycin    ethylsuccinate Suspension 40 mG/mL 128 milliGRAM(s) Oral every 12 hours  losartan 100 milliGRAM(s) Oral daily  metoprolol tartrate 25 milliGRAM(s) Oral two times a day  montelukast 10 milliGRAM(s) Oral daily  multivitamin 1 Tablet(s) Oral daily  polyethylene glycol 3350 17 Gram(s) Oral two times a day  pramipexole 0.75 milliGRAM(s) Oral <User Schedule>  pregabalin 100 milliGRAM(s) Oral every 8 hours  senna 2 Tablet(s) Oral at bedtime  Vonoprazan (Voquezna) 10mg 1 Tablet(s) 1 Tablet(s) Oral daily    MEDICATIONS  (PRN):  benzocaine/menthol Lozenge 1 Lozenge Oral every 3 hours PRN Mouth Sores  HYDROmorphone   Tablet 4 milliGRAM(s) Oral every 8 hours PRN Severe Pain (7 - 10)  melatonin 6 milliGRAM(s) Oral at bedtime PRN Insomnia    Review of Systems: Refer to HPI for pertinent positives and negatives. All other ROS reviewed and negative except as otherwise stated above.    Vital Signs Last 24 Hrs  T(F): 98.3 (31 Oct 2024 07:55), Max: 98.5 (30 Oct 2024 16:40)  HR: 91 (31 Oct 2024 09:26) (85 - 120)  BP: 130/85 (31 Oct 2024 07:55) (130/85 - 151/80)  RR: 16 (31 Oct 2024 07:55) (16 - 18)  SpO2: 96% (31 Oct 2024 09:26) (93% - 98%)  I&O's Summary    30 Oct 2024 07:01  -  31 Oct 2024 07:00  --------------------------------------------------------  IN: 0 mL / OUT: 900 mL / NET: -900 mL    31 Oct 2024 07:01  -  31 Oct 2024 10:41  --------------------------------------------------------  IN: 0 mL / OUT: 1100 mL / NET: -1100 mL    PHYSICAL EXAM:  GENERAL: NAD, well-groomed  HEAD:  Atraumatic, Normocephalic  EYES: EOMI, PERRL, conjunctiva and sclera clear  ENMT: Moist mucous membranes, Good dentition  NECK: Supple, No JVD  CHEST/LUNG: Clear to auscultation bilaterally, non-labored breathing, good air entry  HEART: IRR, tachycardic   ABDOMEN: Soft, Nontender, Nondistended; Bowel sounds present  VASCULAR: Normal pulses, Normal capillary refill  EXTREMITIES: No cyanosis, No edema  LYMPH: No lymphadenopathy noted  SKIN: Warm, Intact  PSYCH: Normal mood and affect  NERVOUS SYSTEM:  A/O x3, Good concentration; CN 2-12 intact, No focal deficits, moves all extremities    LABS:                        9.4    6.81  )-----------( 467      ( 31 Oct 2024 06:00 )             30.3     10-31    137  |  100  |  23  ----------------------------<  111  4.5   |  27  |  0.80    Ca    9.7      31 Oct 2024 06:00    TPro  6.8  /  Alb  2.7  /  TBili  0.4  /  DBili  x   /  AST  33  /  ALT  32  /  AlkPhos  227  10-31    Urinalysis Basic - ( 31 Oct 2024 06:00 )    Color: x / Appearance: x / SG: x / pH: x  Gluc: 111 mg/dL / Ketone: x  / Bili: x / Urobili: x   Blood: x / Protein: x / Nitrite: x   Leuk Esterase: x / RBC: x / WBC x   Sq Epi: x / Non Sq Epi: x / Bacteria: x    Culture - Body Fluid with Gram Stain (collected 25 Oct 2024 13:46)  Source: Body Fluid  Gram Stain (25 Oct 2024 21:03):    polymorphonuclear leukocytes seen    Gram positive cocci in pairs seen    by cytocentrifuge  Final Report (30 Oct 2024 15:56):    Rare Staphylococcus aureus  Organism: Staphylococcus aureus (30 Oct 2024 15:56)  Organism: Staphylococcus aureus (30 Oct 2024 15:56)      Method Type: TAYA      -  Clindamycin: R <=0.25 This isolate is presumed to be clindamycin resistant based on detection of inducible resistance. Clindamycin may still be effective in some patients.      -  Erythromycin: R >4      -  Gentamicin: S <=1 Should not be used as monotherapy      -  Oxacillin: S <=0.25 Oxacillin predicts susceptibility for dicloxacillin, methicillin, and nafcillin      -  Penicillin: R >8      -  Rifampin: S <=1 Should not be used as monotherapy      -  Tetracycline: S <=1      -  Trimethoprim/Sulfamethoxazole: S <=0.5/9.5      -  Vancomycin: S 1    COVID-19 PCR: NotDetec (10-30-24 @ 21:45)  COVID-19 PCR: NotDetec (10-21-24 @ 09:20)  COVID-19 PCR: NotDetec (10-17-24 @ 16:34)    RADIOLOGY & ADDITIONAL TESTS:    10/23 EKG (personally reviewed by me): A-fib with RVR at 133bpm    Care Discussed with Consultants/Other Providers: d/w RN Cyndi

## 2024-10-31 NOTE — DIETITIAN INITIAL EVALUATION ADULT - ADD RECOMMEND
HTN (hypertension) 1) Monitor Weights, Intake, Tolerance, Skin & Labwork  2) Education Provided on Proper Nutrition  3) Recommend Change Diet to Regular Diet   4) Continue Nutrition Plan of Care

## 2024-10-31 NOTE — DIETITIAN INITIAL EVALUATION ADULT - FACTORS AFF FOOD INTAKE
States Good PO Intake/Appetite over Last Week  Denies Changes/Decrease in Meal Consumption Recently (Per Patient)/none

## 2024-10-31 NOTE — CONSULT NOTE ADULT - ASSESSMENT
75 y/o F with PMHx of OA, HTN, gastroparesis, peripheral neuropathy, multiple prior thoracolumbar spine surgeries for congenital scoliosis [place]/ [place] of hardware (1991) and recent fall with T11-T12 fracture, s/p thoracolumbar posterior fusion for t spine fx with plastics closure on  9/29 with Dr. Stroud and Dr. Funk, c/b post op csf leak, new onset paroxysmal Afib with RVR, increased post op pain and drainage from incision site, pleural effusions, symptomatic orthostatic hypotension, urinary retention, post op anemia, and CP with spontaneous resolution. Patient was sent to [facility] rehab on 10/17, c/b AMS, sepsis with high fever, rigors, tachycardia on 10/20 found to have MSSA bacteremia with right upper lobe infiltrate, and UTI. CT spine no report of collection. Urine +ve E coli, transferred back to [facility] on 10/23 for MICHAEL, advanced spine imaging, and further management, found to have paraspinal abscess on MR imaging, S/p paraspinal collection aspiration on 10/25, culture growing gram positive cocci in pairs, with rare staph aureus. Neurosurgery advised no acute surgical revision necessary, repeat BCx negative. To continue long-term IV cefazolin via PICC until 12/12/24 (6 wks) with eventual transition to PO Duricef long term/indefinitely as per ID. MICHAEL was negative for valvular vegetations but did reveal incidental PFO. Hospital course significant for recurrent fevers, A fib, started AC. Now at [facility] for rehab.     MSSA Bacteremia   - s/p paraspinal collection aspiration 10/25 with Dr. Guerra  - continue Cefazolin 2g q8h via PICC until 12/12, then start Duricef 500mg bid indefinitely     Back Pain  - S/P T9-L3 open fusion with PSO/lag screw partial corpectomy/interbody with complex plastic closure (w/ Dr. Stroud and Dr. Tinajero on 9/29  - TLSO when OOB  - PT/OT per PMR  - Pain management per PMR    Hypertension  Orthostasis   - monitor vitals  - chlorthalidone on hold  - on losartan 100mg daily (can reduce if interfering with therapies)  - midodrine 7.5mg bid  - on metoprolol 25mg bid for rate control    A-fib  - tachy to 110s this am in the setting of pain  - continue Eliquis AC   - lopressor 25mg bid    Anemia   - monitor     DVT Prophylaxis:  - Eliquis    Thank you for involving us in the care of this patient. Medicine service will follow.

## 2024-11-01 PROCEDURE — 99232 SBSQ HOSP IP/OBS MODERATE 35: CPT

## 2024-11-01 PROCEDURE — 99232 SBSQ HOSP IP/OBS MODERATE 35: CPT | Mod: GC

## 2024-11-01 RX ORDER — SODIUM CHLORIDE 9 MG/ML
500 INJECTION, SOLUTION INTRAMUSCULAR; INTRAVENOUS; SUBCUTANEOUS
Refills: 0 | Status: DISCONTINUED | OUTPATIENT
Start: 2024-11-01 | End: 2024-11-02

## 2024-11-01 RX ORDER — MIDODRINE HYDROCHLORIDE 5 MG/1
2.5 TABLET ORAL ONCE
Refills: 0 | Status: COMPLETED | OUTPATIENT
Start: 2024-11-01 | End: 2024-11-01

## 2024-11-01 RX ADMIN — SODIUM CHLORIDE 100 MILLILITER(S): 9 INJECTION, SOLUTION INTRAMUSCULAR; INTRAVENOUS; SUBCUTANEOUS at 10:53

## 2024-11-01 RX ADMIN — MONTELUKAST SODIUM 10 MILLIGRAM(S): 10 TABLET ORAL at 11:56

## 2024-11-01 RX ADMIN — HYDROMORPHONE HYDROCHLORIDE 4 MILLIGRAM(S): 2 TABLET ORAL at 22:44

## 2024-11-01 RX ADMIN — Medication 10 MILLIGRAM(S): at 05:39

## 2024-11-01 RX ADMIN — ACETAMINOPHEN 500MG 975 MILLIGRAM(S): 500 TABLET, COATED ORAL at 15:30

## 2024-11-01 RX ADMIN — ACETAMINOPHEN 500MG 975 MILLIGRAM(S): 500 TABLET, COATED ORAL at 05:39

## 2024-11-01 RX ADMIN — Medication 128 MILLIGRAM(S): at 05:44

## 2024-11-01 RX ADMIN — POLYETHYLENE GLYCOL 3350 17 GRAM(S): 17 POWDER, FOR SOLUTION ORAL at 08:51

## 2024-11-01 RX ADMIN — Medication 100 MILLIGRAM(S): at 14:30

## 2024-11-01 RX ADMIN — MIDODRINE HYDROCHLORIDE 2.5 MILLIGRAM(S): 5 TABLET ORAL at 09:59

## 2024-11-01 RX ADMIN — Medication 10 MILLIGRAM(S): at 14:32

## 2024-11-01 RX ADMIN — SODIUM CHLORIDE 100 MILLILITER(S): 9 INJECTION, SOLUTION INTRAMUSCULAR; INTRAVENOUS; SUBCUTANEOUS at 12:58

## 2024-11-01 RX ADMIN — Medication 50000 UNIT(S): at 05:45

## 2024-11-01 RX ADMIN — Medication 1 TABLET(S): at 11:55

## 2024-11-01 RX ADMIN — HYDROMORPHONE HYDROCHLORIDE 4 MILLIGRAM(S): 2 TABLET ORAL at 02:15

## 2024-11-01 RX ADMIN — Medication 40 MILLIGRAM(S): at 22:40

## 2024-11-01 RX ADMIN — HYDROMORPHONE HYDROCHLORIDE 4 MILLIGRAM(S): 2 TABLET ORAL at 01:19

## 2024-11-01 RX ADMIN — PREGABALIN 100 MILLIGRAM(S): 75 CAPSULE ORAL at 14:31

## 2024-11-01 RX ADMIN — Medication 2 TABLET(S): at 22:39

## 2024-11-01 RX ADMIN — ACETAMINOPHEN 500MG 975 MILLIGRAM(S): 500 TABLET, COATED ORAL at 14:31

## 2024-11-01 RX ADMIN — APIXABAN 5 MILLIGRAM(S): 2.5 TABLET, FILM COATED ORAL at 18:12

## 2024-11-01 RX ADMIN — PREGABALIN 100 MILLIGRAM(S): 75 CAPSULE ORAL at 05:41

## 2024-11-01 RX ADMIN — CHLORHEXIDINE GLUCONATE 1 APPLICATION(S): 1.2 RINSE ORAL at 12:56

## 2024-11-01 RX ADMIN — PRAMIPEXOLE 0.75 MILLIGRAM(S): 1.5 TABLET, EXTENDED RELEASE ORAL at 20:10

## 2024-11-01 RX ADMIN — SODIUM CHLORIDE 100 MILLILITER(S): 9 INJECTION, SOLUTION INTRAMUSCULAR; INTRAVENOUS; SUBCUTANEOUS at 11:54

## 2024-11-01 RX ADMIN — APIXABAN 5 MILLIGRAM(S): 2.5 TABLET, FILM COATED ORAL at 05:41

## 2024-11-01 RX ADMIN — Medication 100 MILLIGRAM(S): at 05:30

## 2024-11-01 RX ADMIN — ACETAMINOPHEN 500MG 975 MILLIGRAM(S): 500 TABLET, COATED ORAL at 22:39

## 2024-11-01 RX ADMIN — METOPROLOL TARTRATE 50 MILLIGRAM(S): 100 TABLET, FILM COATED ORAL at 18:12

## 2024-11-01 RX ADMIN — SODIUM CHLORIDE 100 MILLILITER(S): 9 INJECTION, SOLUTION INTRAMUSCULAR; INTRAVENOUS; SUBCUTANEOUS at 09:59

## 2024-11-01 RX ADMIN — Medication 20 MILLIGRAM(S): at 11:56

## 2024-11-01 RX ADMIN — Medication 100 MILLIGRAM(S): at 22:40

## 2024-11-01 RX ADMIN — METOPROLOL TARTRATE 50 MILLIGRAM(S): 100 TABLET, FILM COATED ORAL at 05:42

## 2024-11-01 RX ADMIN — Medication 10 MILLIGRAM(S): at 22:39

## 2024-11-01 RX ADMIN — SODIUM CHLORIDE 100 MILLILITER(S): 9 INJECTION, SOLUTION INTRAMUSCULAR; INTRAVENOUS; SUBCUTANEOUS at 14:33

## 2024-11-01 RX ADMIN — PREGABALIN 100 MILLIGRAM(S): 75 CAPSULE ORAL at 22:42

## 2024-11-01 RX ADMIN — LOSARTAN POTASSIUM 100 MILLIGRAM(S): 100 TABLET, FILM COATED ORAL at 05:41

## 2024-11-01 NOTE — PROGRESS NOTE ADULT - ASSESSMENT
73 y/o F with PMHx of OA, HTN, gastroparesis, peripheral neuropathy, multiple prior thoracolumbar spine surgeries for congenital scoliosis [place]/ [place] of [place] (1991) and recent fall with T11-T12 fracture, s/p thoracolumbar posterior fusion for t spine fx with plastics closure on  9/29 with Dr. Stroud and Dr. Funk, c/b post op csf leak, new onset paroxysmal Afib with RVR, increased post op pain and drainage from incision site, pleural effusions, symptomatic orthostatic hypotension, urinary retention, post op anemia, and CP with spontaneous resolution. Patient was sent to [facility] rehab on 10/17, c/b AMS, sepsis with high fever, rigors, tachycardia on 10/20 found to have MSSA bacteremia with right upper lobe infiltrate, and UTI. CT spine no report of collection. Urine +ve E coli, transferred back to [facility] on 10/23 for MICHAEL, advanced spine imaging, and further management, found to have paraspinal abscess on MR imaging, S/p paraspinal collection aspiration on 10/25, culture growing gram positive cocci in pairs, with rare staph aureus. Neurosurgery advised no acute surgical revision necessary, repeat BCx negative. To continue long-term IV cefazolin via PICC until 12/12/24 (6 wks) with eventual transition to PO Duricef long term/indefinitely as per ID. MICHAEL was negative for valvular vegetations but did reveal incidental PFO. Hospital course significant for recurrent fevers, A fib (resolved) with source control and electrolyte correction, initiated on DOAc given elevated [id number]. Now admitted to [facility] after for initiation of a multidisciplinary rehab program consisting focused on functional mobility, transfers and ADLs.    #Paraspinal abscess with Sepsis due to methicillin susceptible Staphylococcus aureus.   - 10/20 Bcx MSSA, 10/21 no growth at 24hrs  - CXR with old rib fracture and small RUL infiltrate  - 10/20 +UA for Ecoli, follow urine culture  - Lactic acidosis resolved, lactate 1.6 10/23  - 10/23 Na 134 K 3.3 Phos 1.5  - MRI: rim-enhancing heterogeneous fluid collection suspicious for an abscess; culture growing GPC in pairs and rare staph aureus   - TTE: no evidence of vegetations  - No Acute surgical intervention per neurosurgery   - S/p paraspinal collection aspiration with paraspinal abscess drain placed with Dr. Guerra 10/25  - Repeat daily Bcx- negative  - Monitor WBC and fever curve  - S/p PICC insertion 10/28  - TLSO when OOB  - Cefazolin 2000mg IV q8 until 12/12/24 via PICC- until 12/12/24-> Start PO Duricef 500mg BID on 12/13/24 - long term/indefinitely   - Weekly CBC Diff BMP ESR CRP; emailed to [email or web address]  - (10/31) CRP 56, ESR 95       Comprehensive Multidisciplinary Rehab Program:  - Start comprehensive rehab program, 3 hours a day, 5 days a week.  - PT 2hr/day: Focused on improving strength, endurance, coordination, balance, functional mobility, and transfers  - OT 1hr/day: Focused on improving strength, fine motor skills, coordination, posture and ADLs.    - Activity Limitations: Decreased social, vocational and leisure activities, decreased self care and ADLs, decreased mobility, decreased ability to manage household and finances.     -----------------------------------------------------------------------------------  Concurrent Medical Problems    #Paroxysmal atrial fibrillation.   - 10/25 ECG and exam notable for irregular rate and rhythm  - Metoprolol to 25 BID for rate control  - S/p heparin gtt 3500mg IV q6 for anticoagulation  - EKG - tachy to 110s this am in the setting of pain    #Acute on chronic low back pain.   - S/P T9-L3 open fusion with PSO/lag screw partial corpectomy/interbody with complex plastic closure (w/ Dr. Stroud and Dr. Tinajero on 9/29  - TLSO when OOB  - Pain management: Tylenol 975mg ATC TID,  Duloxetine 20mg daily, Lyrica 100mg TID , Flexeril 10mg TID standing,   - Start hydromorphone 4 mg PO q8h PRN  - Eliquis 5mg BID    #HTN  #Orthostatic Hypotension  #HLD  - 10/1 TTE: EF 61%  - S/P 1L IVF bolus (10/13) for symptomatic hypotension with good effect  - Metoprolol to 25 BID  - Atorvastatin 40mg HS  - Losartan 100 mg daily  - Chlorthalidone 25 mg daily on hold  (restart if SBP is consistently above 140 per cardio)  - Midodrine 7.5 mg on hold    # Postoperative anemia  - S/p PRBCs intraoperatively for T spine fusion   - Hgb stable with no evidence of active bleed at this time   - 10/30- Hgb 8.6/ Hct 28.9, (10/31) 9.4/30.3    - Monitor H/H; transfuse for Hgb <7    #SILVANA (obstructive sleep apnea).   - Transient desaturations on RA    - Nocturnal 2L O2 and PRN during day during naps, monitor sats   - F/u with PCP outpatient as may benefit from sleep study and CPAP.    #Restless legs syndrome (RLS).   - Pramipexole 0.75mg daily     #Seasonal allergies.   - Montelukast 10mg daily    #Liver cyst.  -10/10 CT Abd/pelvis- There is a large cyst, stable since prior exam. There are small hypodense foci on segment II and segment I too small to characterize, unchanged since prior  -F/u outpatient     #Elevated serum alkaline phosphatase level.   - Hi serum alk phos levels 247 --> 228 --> 172 --> 233 --> 239--> 240--> 247--> 234---> 276 (10/31) 227  - Monitor serum alk phos levels.    #Gastritis.   - Vonoprazan 10mg daily  - Erythromycin 128mg oral fluid BID    #Mood/Cognition:  Neuropsychology consult PRN    #Sleep:   Maintain quiet hours and low stim environment.  Melatonin 6mg HS PRN to maximize participation in therapy during the day.     #GI/Bowel:  Senna QHS  Miralax PRN Daily    #/Bladder:   - PVR q 8 hours (SC if > 400)    #Skin/Pressure Injury:   - Skin assessment on admission: Midline sine dressing with drain in place, with minimal drainage. No pressure injuries.    #Diet/Supplement  Current Diet: Regular- Dash diet   -Ergocalciferol 06836S weekly   -Multivitamin daily     #Precautions / PROPHYLAXIS:   - Falls,  Spinal  - ortho: Weight bearing status: WBAT, TLSO brace OOB  - Lungs: Incentive Spirometer   - DVT ppx: SCDs, TEDs, Eliquis 5mg BID  - Pressure injury/Skin:  OOB to Chair, PT/OT      ---------------  Code Status: FULL  Emergency Contact:    Outpatient Follow-up (Specialty/Name of physician):    Fran Carr  Internal Medicine  [address] [address]  Phone: [phone]  Fax: [phone]  Follow Up Time:     Kaushal Waterman  Infectious Disease  [address] [place] [address]  Phone: [phone]  Follow Up Time:  [facility]  Neurosurgery  Referral Assistance Program    Guille Lance  [facility]  ORTHOSURG [address]  Scheduled Appointment: 11/22/2024    Catalino Cotto  [facility]  WEIGHTMGMT  [facility]  Scheduled Appointment: 12/05/2024    Haydee Bernstein  [facility]  GASTRO [address]  Scheduled Appointment: 01/06/2025      [facility] - [facility]ious Disease  Infectious Disease  [address] [facility], [place] [address]  Phone: [phone]   73 y/o F with PMHx of OA, HTN, gastroparesis, peripheral neuropathy, multiple prior thoracolumbar spine surgeries for congenital scoliosis done >[place] ago [place] of [place] w/ removal of hardware (1991) and recent fall with T11-T12 fracture, s/p thoracolumbar posterior fusion for t spine fx with plastics closure on  9/29 with Dr. Stroud and Dr. Funk, c/b post op csf leak, new onset paroxysmal Afib with RVR, increased post op pain and drainage from incision site, pleural effusions, symptomatic orthostatic hypotension, urinary retention, post op anemia, and CP with spontaneous resolution. Patient was sent to [facility] rehab on 10/17, c/b AMS, sepsis with high fever, rigors, tachycardia on 10/20 found to have MSSA bacteremia with right upper lobe infiltrate, and UTI. CT spine no report of collection. Urine +ve E coli, transferred back to [facility] on 10/23 for MICHAEL, advanced spine imaging, and further management, found to have paraspinal abscess on MR imaging, S/p paraspinal collection aspiration on 10/25, culture growing gram positive cocci in pairs, with rare staph aureus. Neurosurgery advised no acute surgical revision necessary, repeat BCx negative. To continue long-term IV cefazolin via PICC until 12/12/24 (6 wks) with eventual transition to PO Duricef long term/indefinitely as per ID. MICHAEL was negative for valvular vegetations but did reveal incidental PFO. Hospital course significant for recurrent fevers, A fib (resolved) with source control and electrolyte correction, initiated on DOAc given elevated [id number]. Now admitted to [facility] after for initiation of a multidisciplinary rehab program consisting focused on functional mobility, transfers and ADLs.    #Paraspinal abscess with Sepsis due to methicillin susceptible Staphylococcus aureus.   - 10/20 Bcx MSSA, 10/21 no growth at 24hrs  - CXR with old rib fracture and small RUL infiltrate  - 10/20 +UA for Ecoli, follow urine culture  - Lactic acidosis resolved, lactate 1.6 10/23  - 10/23 Na 134 K 3.3 Phos 1.5  - MRI: rim-enhancing heterogeneous fluid collection suspicious for an abscess; culture growing GPC in pairs and rare staph aureus   - TTE: no evidence of vegetations  - No Acute surgical intervention per neurosurgery   - S/p paraspinal collection aspiration with paraspinal abscess drain placed with Dr. Guerra 10/25  - Repeat daily Bcx- negative  - Monitor WBC and fever curve  - S/p PICC insertion 10/28  - TLSO when OOB  - Cefazolin 2000mg IV q8 until 12/12/24 via PICC- until 12/12/24-> Start PO Duricef 500mg BID on 12/13/24 - long term/indefinitely   - Weekly CBC Diff BMP ESR CRP; emailed to [email or web address]  - (10/31) CRP 56, ESR 95       Comprehensive Multidisciplinary Rehab Program:  - Comprehensive rehab program, 3 hours a day, 5 days a week.  - PT 2hr/day: Focused on improving strength, endurance, coordination, balance, functional mobility, and transfers  - OT 1hr/day: Focused on improving strength, fine motor skills, coordination, posture and ADLs.    - Activity Limitations: Decreased social, vocational and leisure activities, decreased self care and ADLs, decreased mobility, decreased ability to manage household and finances.     -----------------------------------------------------------------------------------  Concurrent Medical Problems    #Paroxysmal atrial fibrillation.   - 10/25 ECG and exam notable for irregular rate and rhythm  - Metoprolol to 25 BID for rate control  - S/p heparin gtt 3500mg IV q6 for anticoagulation  - EKG - tachy to 110s this am in the setting of pain    #Acute on chronic low back pain.   - S/P T9-L3 open fusion with PSO/lag screw partial corpectomy/interbody with complex plastic closure (w/ Dr. Stroud and Dr. Tinajero on 9/29  - TLSO when OOB  - Pain management: Tylenol 975mg ATC TID,  Duloxetine 20mg daily, Lyrica 100mg TID , Flexeril 10mg TID standing,   - Hydromorphone 4 mg PO q8h PRN  - Eliquis 5mg BID    #HTN  #Orthostatic Hypotension  #HLD  - 10/1 TTE: EF 61%  - S/P 1L IVF bolus (10/13) for symptomatic hypotension with good effect  - Metoprolol to 25 BID--> 50 mg po x 2 daily (10/31)  - Atorvastatin 40mg HS  - Losartan 100 mg daily  - Chlorthalidone 25 mg daily on hold  (restart if SBP is consistently above 140 per cardio)  - Midodrine 7.5 mg on hold--> Received 2.5 mg this am   - Hospitalist to adjust meds     # Postoperative anemia  - S/p PRBCs intraoperatively for T spine fusion   - Hgb stable with no evidence of active bleed at this time   - 10/30- Hgb 8.6/ Hct 28.9, (10/31) 9.4/30.3    - Monitor H/H; transfuse for Hgb <7    #SILVANA (obstructive sleep apnea).   - Transient desaturations on RA    - Nocturnal 2L O2 and PRN during day during naps, monitor sats   - F/u with PCP outpatient as may benefit from sleep study and CPAP.    #Restless legs syndrome (RLS).   - Pramipexole 0.75mg daily     #Seasonal allergies.   - Montelukast 10mg daily    #Liver cyst.  -10/10 CT Abd/pelvis- There is a large cyst, stable since prior exam. There are small hypodense foci on segment II and segment I too small to characterize, unchanged since prior  -F/u outpatient     #Elevated serum alkaline phosphatase level.   - Hi serum alk phos levels 247 --> 228 --> 172 --> 233 --> 239--> 240--> 247--> 234---> 276 (10/31) 227  - Monitor serum alk phos levels.    #Gastritis.   - Vonoprazan 10mg daily  - Erythromycin 128mg oral fluid BID    #Mood/Cognition:  Neuropsychology consult PRN    #Sleep:   Maintain quiet hours and low stim environment.  Melatonin 6mg HS PRN to maximize participation in therapy during the day.     #GI/Bowel:  Senna QHS  Miralax PRN Daily    #/Bladder:   - PVR q 8 hours (SC if > 400)    #Skin/Pressure Injury:   - Skin assessment on admission: Midline sine dressing with drain in place, with minimal drainage. No pressure injuries.    #Diet/Supplement  Current Diet: Regular- Dash diet   -Ergocalciferol 89854Y weekly   -Multivitamin daily     #Precautions / PROPHYLAXIS:   - Falls,  Spinal  - ortho: Weight bearing status: WBAT, TLSO brace OOB  - Lungs: Incentive Spirometer   - DVT ppx: SCDs, TEDs, Eliquis 5mg BID  - Pressure injury/Skin:  OOB to Chair, PT/OT      ---------------  Code Status: FULL  Emergency Contact:    Outpatient Follow-up (Specialty/Name of physician):    Fran Carr  Internal Medicine  [address] [address]  Phone: [phone]  Fax: [phone]  Follow Up Time:     Kaushal Waterman  Infectious Disease  [address] [place] [address]  Phone: [phone]  Follow Up Time:  [facility] Neurosurgery  Neurosurgery  Referral Assistance Program    Guille Lance  [facility]  ORTHOSURG [address] [facility]  Scheduled Appointment: 11/22/2024    Catalino Cotto  [facility]  WEIGHTMGMT  [facility]  Scheduled Appointment: 12/05/2024    Haydee Bernstein  [facility]  GASTRO [address]  Scheduled Appointment: 01/06/2025      [facility] - Infectious Disease  Infectious Disease  [address] [facility], [place] [address]  Phone: [phone]

## 2024-11-01 NOTE — PROGRESS NOTE ADULT - SUBJECTIVE AND OBJECTIVE BOX
PROGRESS NOTE:     Patient is a 74y old  Female who presents with a chief complaint of MSSA Bacteremia 2/2 paraspinal abscess s/p thoracolumbar (T9-L3) posterior fusion of T Spine revision (2024 10:50)          SUBJECTIVE & OBJECTIVE:   Pt seen and examined at bedside in AM. reports of feeling tired   no overnight events.       REVIEW OF SYSTEMS: remaining ROS negative     PHYSICAL EXAM:  VITALS:  Vital Signs Last 24 Hrs  T(C): 36.6 (2024 07:53), Max: 36.7 (31 Oct 2024 19:47)  T(F): 97.8 (2024 07:53), Max: 98 (31 Oct 2024 19:47)  HR: 79 (2024 11:57) (79 - 110)  BP: 103/58 (2024 11:57) (79/46 - 130/69)  BP(mean): --  RR: 26 (2024 11:57) (15 - 26)  SpO2: 95% (2024 11:57) (93% - 95%)    Parameters below as of 2024 11:06  Patient On (Oxygen Delivery Method): room air          GENERAL: NAD,  no increased WOB  HEAD:  Atraumatic, Normocephalic  EYES: EOMI, conjunctiva and sclera clear  ENMT: Moist mucous membranes  NECK: Supple, No JVD  NERVOUS SYSTEM:  Alert & Oriented    CHEST/LUNG: Clear to auscultation bilaterally; No rales, rhonchi, wheezing  HEART: Regular rate and rhythm  ABDOMEN: Soft, Nontender, Nondistended; Bowel sounds present  EXTREMITIES: No clubbing, cyanosis, calf tenderness or edema b/l      MEDICATIONS  (STANDING):  acetaminophen     Tablet .. 975 milliGRAM(s) Oral every 8 hours  apixaban 5 milliGRAM(s) Oral every 12 hours  atorvastatin 40 milliGRAM(s) Oral at bedtime  ceFAZolin   IVPB 2000 milliGRAM(s) IV Intermittent every 8 hours  chlorhexidine 2% Cloths 1 Application(s) Topical daily  cyclobenzaprine 10 milliGRAM(s) Oral three times a day  DULoxetine 20 milliGRAM(s) Oral daily  ergocalciferol 05086 Unit(s) Oral <User Schedule>  erythromycin    ethylsuccinate Suspension 40 mG/mL 128 milliGRAM(s) Oral every 12 hours  metoprolol tartrate 50 milliGRAM(s) Oral two times a day  montelukast 10 milliGRAM(s) Oral daily  multivitamin 1 Tablet(s) Oral daily  polyethylene glycol 3350 17 Gram(s) Oral two times a day  pramipexole 0.75 milliGRAM(s) Oral <User Schedule>  pregabalin 100 milliGRAM(s) Oral every 8 hours  senna 2 Tablet(s) Oral at bedtime  sodium chloride 0.9%. 500 milliLiter(s) (100 mL/Hr) IV Continuous <Continuous>  Vonoprazan (Voquezna) 10mg 1 Tablet(s) 1 Tablet(s) Oral daily    MEDICATIONS  (PRN):  benzocaine/menthol Lozenge 1 Lozenge Oral every 3 hours PRN Mouth Sores  HYDROmorphone   Tablet 4 milliGRAM(s) Oral every 8 hours PRN Severe Pain (7 - 10)  melatonin 6 milliGRAM(s) Oral at bedtime PRN Insomnia      Allergies    Dilantin (Urticaria)  penicillins (Urticaria)    Intolerances    erythromycin (Stomach Upset; Vomiting; Nausea)            LABS:                           9.4    6.81  )-----------( 467      ( 31 Oct 2024 06:00 )             30.3     10-    137  |  100  |  23  ----------------------------<  111[H]  4.5   |  27  |  0.80    Ca    9.7      31 Oct 2024 06:00    TPro  6.8  /  Alb  2.7[L]  /  TBili  0.4  /  DBili  x   /  AST  33  /  ALT  32  /  AlkPhos  227[H]  10      Urinalysis Basic - ( 31 Oct 2024 16:00 )    Color: Yellow / Appearance: Clear / S.012 / pH: x  Gluc: x / Ketone: Negative mg/dL  / Bili: Negative / Urobili: 0.2 mg/dL   Blood: x / Protein: Negative mg/dL / Nitrite: Negative   Leuk Esterase: Small / RBC: 0 /HPF / WBC 6 /HPF   Sq Epi: x / Non Sq Epi: x / Bacteria: Few /HPF      CAPILLARY BLOOD GLUCOSE                    RECENT CULTURES:          RADIOLOGY & ADDITIONAL TESTS:

## 2024-11-01 NOTE — PROGRESS NOTE ADULT - SUBJECTIVE AND OBJECTIVE BOX
Patient is a 74y old  Female who presents with a chief complaint of Fusion of spine     (31 Oct 2024 11:05)      HPI:  73 y/o F with PMHx of OA, HTN, gastroparesis, peripheral neuropathy, multiple prior thoracolumbar spine surgeries for congenital scoliosis [place] state w/ removal of hardware () and recent fall with T11-T12 fracture, s/p thoracolumbar posterior fusion for t spine fx with plastics closure on   with Dr. Stroud and Dr. Funk, c/b post op csf leak, new onset paroxysmal Afib with RVR, increased post op pain and drainage from incision site, pleural effusions, symptomatic orthostatic hypotension, urinary retention, post op anemia, and CP with spontaneous resolution. Patient was sent to [facility] rehab on 10/17, c/b AMS, sepsis with high fever, rigors, tachycardia on 10/20 found to have MSSA bacteremia with right upper lobe infiltrate, and UTI. CT spine no report of collection. Urine +ve E coli, transferred back to [facility] on 10/23 for MICHAEL, advanced spine imaging, and further management, found to have paraspinal abscess on MR imaging, S/p paraspinal collection aspiration on 10/25, culture growing gram positive cocci in pairs, with rare staph aureus. Neurosurgery advised no acute surgical revision necessary, repeat BCx negative. To continue long-term IV cefazolin via PICC until 24 (6 wks) with eventual transition to PO Duricef long term/indefinitely as per ID. MICHAEL was negative for valvular vegetations but did reveal incidental PFO. Hospital course significant for recurrent fevers, A fib (resolved) with source control and electrolyte correction, initiated on DOAc given elevated [id number]. Patient optimized and was evaluated by PM&R and therapy for functional deficits, gait/ADL impairments and acute rehabilitation was recommended. Patient was cleared for discharge to [facility] IRU on 10/30/24.   (30 Oct 2024 13:05)        Pt seen at bedside in good spirits. NAD. T spine pigtail to back with creamy drainage. Pt denies pain at this time. Denies numbness or tingling at this time. BM today. Voiding. Last straight cath 10/31.   Pt denies headache, Palpations, Chest pain, SOB, abdomin pain, NVD, dysuria      Pt hypotensive and symptomatic ( "feeling weak") during therapy. Received am dose of cozaar 100mg and metoprolol 50mg this am.  Hospitalist Ali following NS @100cc/hr  for 500ml. Midodrine 2.5mg given and will adjust BP/HR med.       MEDICATIONS  (STANDING):  acetaminophen     Tablet .. 975 milliGRAM(s) Oral every 8 hours  apixaban 5 milliGRAM(s) Oral every 12 hours  atorvastatin 40 milliGRAM(s) Oral at bedtime  ceFAZolin   IVPB 2000 milliGRAM(s) IV Intermittent every 8 hours  chlorhexidine 2% Cloths 1 Application(s) Topical daily  cyclobenzaprine 10 milliGRAM(s) Oral three times a day  DULoxetine 20 milliGRAM(s) Oral daily  ergocalciferol 50252 Unit(s) Oral <User Schedule>  erythromycin    ethylsuccinate Suspension 40 mG/mL 128 milliGRAM(s) Oral every 12 hours  losartan 100 milliGRAM(s) Oral daily  metoprolol tartrate 50 milliGRAM(s) Oral two times a day  montelukast 10 milliGRAM(s) Oral daily  multivitamin 1 Tablet(s) Oral daily  polyethylene glycol 3350 17 Gram(s) Oral two times a day  pramipexole 0.75 milliGRAM(s) Oral <User Schedule>  pregabalin 100 milliGRAM(s) Oral every 8 hours  senna 2 Tablet(s) Oral at bedtime  sodium chloride 0.9%. 500 milliLiter(s) (100 mL/Hr) IV Continuous <Continuous>  Vonoprazan (Voquezna) 10mg 1 Tablet(s) 1 Tablet(s) Oral daily    MEDICATIONS  (PRN):  benzocaine/menthol Lozenge 1 Lozenge Oral every 3 hours PRN Mouth Sores  HYDROmorphone   Tablet 4 milliGRAM(s) Oral every 8 hours PRN Severe Pain (7 - 10)  melatonin 6 milliGRAM(s) Oral at bedtime PRN Insomnia      Allergies    Dilantin (Urticaria)  penicillins (Urticaria)    Intolerances    erythromycin (Stomach Upset; Vomiting; Nausea)        VITALS  74y  Vital Signs Last 24 Hrs  T(C): 36.6 (2024 07:53), Max: 36.7 (31 Oct 2024 19:47)  T(F): 97.8 (2024 07:53), Max: 98 (31 Oct 2024 19:47)  HR: 81 (2024 10:02) (81 - 110)  BP: 79/46 (2024 10:02) (79/46 - 130/69)  BP(mean): --  RR: 16 (2024 10:02) (15 - 16)  SpO2: 94% (2024 10:02) (93% - 95%)    Parameters below as of 2024 10:02  Patient On (Oxygen Delivery Method): room air      Daily     Daily         RECENT LABS:                          9.4    6.81  )-----------( 467      ( 31 Oct 2024 06:00 )             30.3     10    137  |  100  |  23  ----------------------------<  111[H]  4.5   |  27  |  0.80    Ca    9.7      31 Oct 2024 06:00    TPro  6.8  /  Alb  2.7[L]  /  TBili  0.4  /  DBili  x   /  AST  33  /  ALT  32  /  AlkPhos  227[H]  10-31    LIVER FUNCTIONS - ( 31 Oct 2024 06:00 )  Alb: 2.7 g/dL / Pro: 6.8 g/dL / ALK PHOS: 227 U/L / ALT: 32 U/L / AST: 33 U/L / GGT: x             Urinalysis Basic - ( 31 Oct 2024 16:00 )    Color: Yellow / Appearance: Clear / S.012 / pH: x  Gluc: x / Ketone: Negative mg/dL  / Bili: Negative / Urobili: 0.2 mg/dL   Blood: x / Protein: Negative mg/dL / Nitrite: Negative   Leuk Esterase: Small / RBC: 0 /HPF / WBC 6 /HPF   Sq Epi: x / Non Sq Epi: x / Bacteria: Few /HPF          CAPILLARY BLOOD GLUCOSE      Physical Exam: Gen - NAD, Comfortable  HEENT - NCAT, EOMI, PERRLA  Neck - Supple, No limited ROM  Pulm - CTAB, No wheeze, No rhonchi, No crackles  Cardiovascular - RRR, S1S2, No murmurs  Abdomen - Soft, mildly tender in RLQ, (+) BS throughout  Extremities - No C/C/E, No calf tenderness B/L  Neuro-     Cognitive - AAOx3, follows command     Communication - Fluent, No dysarthria     Cranial Nerves - CN 2-12 intact     Motor -                     LEFT    UE - ShAB 4/5, EF 5/5, EE 5/5, WE 5/5,  5/5                    RIGHT UE - ShAB 4/5, EF 5/5, EE 5/5, WE 5/5,  5/5                    LEFT    LE - HF 4/5, KE 5/5, DF 5/5, PF 5/5                    RIGHT LE - HF 4/5, KE 5/5, DF 5/5, PF 5/5        Sensory - Intact to LT in UEs and LEs B/L      Reflexes - DTRs Intact     Coordination - FTN intact  Psychiatric - Mood stable, Affect WNL  Skin: Midline surgical dressing with drain  T spine             Patient is a 74y old  Female who presents with a chief complaint of Fusion of spine     (31 Oct 2024 11:05)      HPI:  75 y/o F with PMHx of OA, HTN, gastroparesis, peripheral neuropathy, multiple prior thoracolumbar spine surgeries for congenital scoliosis done >[place] years ago [place] of state w/ removal of hardware () and recent fall with T11-T12 fracture, s/p thoracolumbar posterior fusion for t spine fx with plastics closure on   with Dr. Stroud and Dr. Funk, c/b post op csf leak, new onset paroxysmal Afib with RVR, increased post op pain and drainage from incision site, pleural effusions, symptomatic orthostatic hypotension, urinary retention, post op anemia, and CP with spontaneous resolution. Patient was sent to [facility] rehab on 10/17, c/b AMS, sepsis with high fever, rigors, tachycardia on 10/20 found to have MSSA bacteremia with right upper lobe infiltrate, and UTI. CT spine no report of collection. Urine +ve E coli, transferred back to [facility] on 10/23 for MICHAEL, advanced spine imaging, and further management, found to have paraspinal abscess on MR imaging, S/p paraspinal collection aspiration on 10/25, culture growing gram positive cocci in pairs, with rare staph aureus. Neurosurgery advised no acute surgical revision necessary, repeat BCx negative. To continue long-term IV cefazolin via PICC until 24 (6 wks) with eventual transition to PO Duricef long term/indefinitely as per ID. MICHAEL was negative for valvular vegetations but did reveal incidental PFO. Hospital course significant for recurrent fevers, A fib (resolved) with source control and electrolyte correction, initiated on DOAc given elevated [id number]. Patient optimized and was evaluated by PM&R and therapy for functional deficits, gait/ADL impairments and acute rehabilitation was recommended. Patient was cleared for discharge to [facility] IRU on 10/30/24.   (30 Oct 2024 13:05)        Pt seen at bedside in good spirits. NAD. T spine pigtail to back with creamy drainage. Pt denies pain at this time. Denies numbness or tingling at this time. TLSO brace when oob. BM today. Voiding. Last straight cath 10/31.   Pt denies headache, Palpations, Chest pain, SOB, abdomin pain, NVD, dysuria      Pt hypotensive and symptomatic ( "feeling weak") during therapy. Received am dose of cozaar 100mg and metoprolol 50mg this am.  Hospitalist Ali following NS @100cc/hr  for 500ml. Midodrine 2.5mg given and will adjust BP/HR med.       MEDICATIONS  (STANDING):  acetaminophen     Tablet .. 975 milliGRAM(s) Oral every 8 hours  apixaban 5 milliGRAM(s) Oral every 12 hours  atorvastatin 40 milliGRAM(s) Oral at bedtime  ceFAZolin   IVPB 2000 milliGRAM(s) IV Intermittent every 8 hours  chlorhexidine 2% Cloths 1 Application(s) Topical daily  cyclobenzaprine 10 milliGRAM(s) Oral three times a day  DULoxetine 20 milliGRAM(s) Oral daily  ergocalciferol 79584 Unit(s) Oral <User Schedule>  erythromycin    ethylsuccinate Suspension 40 mG/mL 128 milliGRAM(s) Oral every 12 hours  losartan 100 milliGRAM(s) Oral daily  metoprolol tartrate 50 milliGRAM(s) Oral two times a day  montelukast 10 milliGRAM(s) Oral daily  multivitamin 1 Tablet(s) Oral daily  polyethylene glycol 3350 17 Gram(s) Oral two times a day  pramipexole 0.75 milliGRAM(s) Oral <User Schedule>  pregabalin 100 milliGRAM(s) Oral every 8 hours  senna 2 Tablet(s) Oral at bedtime  sodium chloride 0.9%. 500 milliLiter(s) (100 mL/Hr) IV Continuous <Continuous>  Vonoprazan (Voquezna) 10mg 1 Tablet(s) 1 Tablet(s) Oral daily    MEDICATIONS  (PRN):  benzocaine/menthol Lozenge 1 Lozenge Oral every 3 hours PRN Mouth Sores  HYDROmorphone   Tablet 4 milliGRAM(s) Oral every 8 hours PRN Severe Pain (7 - 10)  melatonin 6 milliGRAM(s) Oral at bedtime PRN Insomnia      Allergies    Dilantin (Urticaria)  penicillins (Urticaria)    Intolerances    erythromycin (Stomach Upset; Vomiting; Nausea)        VITALS  74y  Vital Signs Last 24 Hrs  T(C): 36.6 (2024 07:53), Max: 36.7 (31 Oct 2024 19:47)  T(F): 97.8 (2024 07:53), Max: 98 (31 Oct 2024 19:47)  HR: 81 (2024 10:02) (81 - 110)  BP: 79/46 (2024 10:02) (79/46 - 130/69)  BP(mean): --  RR: 16 (2024 10:02) (15 - 16)  SpO2: 94% (2024 10:02) (93% - 95%)    Parameters below as of 2024 10:02  Patient On (Oxygen Delivery Method): room air      Daily     Daily         RECENT LABS:                          9.4    6.81  )-----------( 467      ( 31 Oct 2024 06:00 )             30.3     10    137  |  100  |  23  ----------------------------<  111[H]  4.5   |  27  |  0.80    Ca    9.7      31 Oct 2024 06:00    TPro  6.8  /  Alb  2.7[L]  /  TBili  0.4  /  DBili  x   /  AST  33  /  ALT  32  /  AlkPhos  227[H]  10    LIVER FUNCTIONS - ( 31 Oct 2024 06:00 )  Alb: 2.7 g/dL / Pro: 6.8 g/dL / ALK PHOS: 227 U/L / ALT: 32 U/L / AST: 33 U/L / GGT: x             Urinalysis Basic - ( 31 Oct 2024 16:00 )    Color: Yellow / Appearance: Clear / S.012 / pH: x  Gluc: x / Ketone: Negative mg/dL  / Bili: Negative / Urobili: 0.2 mg/dL   Blood: x / Protein: Negative mg/dL / Nitrite: Negative   Leuk Esterase: Small / RBC: 0 /HPF / WBC 6 /HPF   Sq Epi: x / Non Sq Epi: x / Bacteria: Few /HPF          CAPILLARY BLOOD GLUCOSE      Physical Exam: Gen - NAD, Comfortable  HEENT - NCAT, EOMI, PERRLA  Neck - Supple, No limited ROM  Pulm - CTAB, No wheeze, No rhonchi, No crackles  Cardiovascular - RRR, S1S2, No murmurs  Abdomen - Soft, mildly tender in RLQ, (+) BS throughout  Extremities - No C/C/E, No calf tenderness B/L  Neuro-     Cognitive - AAOx3, follows command     Communication - Fluent, No dysarthria     Cranial Nerves - CN 2-12 intact     Motor -                     LEFT    UE - ShAB 4/5, EF 5/5, EE 5/5, WE 5/5,  5/5                    RIGHT UE - ShAB 4/5, EF 5/5, EE 5/5, WE 5/5,  5/5                    LEFT    LE - HF 4/5, KE 5/5, DF 5/5, PF 5/5                    RIGHT LE - HF 4/5, KE 5/5, DF 5/5, PF 5/5        Sensory - Intact to LT in UEs and LEs B/L      Reflexes - DTRs Intact     Coordination - FTN intact  Psychiatric - Mood stable, Affect WNL  Skin: Midline surgical dressing with drain  T spine             Patient is a 74y old  Female who presents with a chief complaint of Fusion of spine     (31 Oct 2024 11:05)      HPI:  75 y/o F with PMHx of OA, HTN, gastroparesis, peripheral neuropathy, multiple prior thoracolumbar spine surgeries for congenital scoliosis [place] of [place] w/ removal of hardware () and recent fall with T11-T12 fracture, s/p thoracolumbar posterior fusion for t spine fx with plastics closure on   with Dr. Stroud and Dr. Funk, c/b post op csf leak, new onset paroxysmal Afib with RVR, increased post op pain and drainage from incision site, pleural effusions, symptomatic orthostatic hypotension, urinary retention, post op anemia, and CP with spontaneous resolution. Patient was sent to [facility] rehab on 10/17, c/b AMS, sepsis with high fever, rigors, tachycardia on 10/20 found to have MSSA bacteremia with right upper lobe infiltrate, and UTI. CT spine no report of collection. Urine +ve E coli, transferred back to [facility] on 10/23 for MICHAEL, advanced spine imaging, and further management, found to have paraspinal abscess on MR imaging, S/p paraspinal collection aspiration on 10/25, culture growing gram positive cocci in pairs, with rare staph aureus. Neurosurgery advised no acute surgical revision necessary, repeat BCx negative. To continue long-term IV cefazolin via PICC until 24 (6 wks) with eventual transition to PO Duricef long term/indefinitely as per ID. MICHAEL was negative for valvular vegetations but did reveal incidental PFO. Hospital course significant for recurrent fevers, A fib (resolved) with source control and electrolyte correction, initiated on DOAc given elevated [id number]. Patient optimized and was evaluated by PM&R and therapy for functional deficits, gait/ADL impairments and acute rehabilitation was recommended. Patient was cleared for discharge to [facility] IRU on 10/30/24.   (30 Oct 2024 13:05)      Subjective:   Pt seen at bedside in good spirits. NAD. T spine pigtail to back with creamy drainage. Pt denies pain at this time. Denies numbness or tingling at this time. TLSO brace when oob. BM today. Voiding. Last straight cath 10/31.   Pt denies headache, Palpations, Chest pain, SOB, abdomin pain, NVD, dysuria     Pt hypotensive and symptomatic ( "feeling weak") during therapy. Received am dose of cozaar 100mg and metoprolol 50mg this am.  Hospitalist Caprice following NS @100cc/hr  for 500ml. Midodrine 2.5mg given and will adjust BP/HR med.       MEDICATIONS  (STANDING):  acetaminophen     Tablet .. 975 milliGRAM(s) Oral every 8 hours  apixaban 5 milliGRAM(s) Oral every 12 hours  atorvastatin 40 milliGRAM(s) Oral at bedtime  ceFAZolin   IVPB 2000 milliGRAM(s) IV Intermittent every 8 hours  chlorhexidine 2% Cloths 1 Application(s) Topical daily  cyclobenzaprine 10 milliGRAM(s) Oral three times a day  DULoxetine 20 milliGRAM(s) Oral daily  ergocalciferol 27172 Unit(s) Oral <User Schedule>  erythromycin    ethylsuccinate Suspension 40 mG/mL 128 milliGRAM(s) Oral every 12 hours  losartan 100 milliGRAM(s) Oral daily  metoprolol tartrate 50 milliGRAM(s) Oral two times a day  montelukast 10 milliGRAM(s) Oral daily  multivitamin 1 Tablet(s) Oral daily  polyethylene glycol 3350 17 Gram(s) Oral two times a day  pramipexole 0.75 milliGRAM(s) Oral <User Schedule>  pregabalin 100 milliGRAM(s) Oral every 8 hours  senna 2 Tablet(s) Oral at bedtime  sodium chloride 0.9%. 500 milliLiter(s) (100 mL/Hr) IV Continuous <Continuous>  Vonoprazan (Voquezna) 10mg 1 Tablet(s) 1 Tablet(s) Oral daily    MEDICATIONS  (PRN):  benzocaine/menthol Lozenge 1 Lozenge Oral every 3 hours PRN Mouth Sores  HYDROmorphone   Tablet 4 milliGRAM(s) Oral every 8 hours PRN Severe Pain (7 - 10)  melatonin 6 milliGRAM(s) Oral at bedtime PRN Insomnia      Allergies  Dilantin (Urticaria)  penicillins (Urticaria)    Intolerances  erythromycin (Stomach Upset; Vomiting; Nausea)      VITALS  74y  Vital Signs Last 24 Hrs  T(C): 36.6 (2024 07:53), Max: 36.7 (31 Oct 2024 19:47)  T(F): 97.8 (2024 07:53), Max: 98 (31 Oct 2024 19:47)  HR: 81 (2024 10:02) (81 - 110)  BP: 79/46 (2024 10:02) (79/46 - 130/69)  RR: 16 (2024 10:02) (15 - 16)  SpO2: 94% (2024 10:02) (93% - 95%)  Parameters below as of 2024 10:02  Patient On (Oxygen Delivery Method): room air      RECENT LABS:                   9.4    6.81  )-----------( 467      ( 31 Oct 2024 06:00 )             30.3     10    137  |  100  |  23  ----------------------------<  111[H]  4.5   |  27  |  0.80    Ca    9.7      31 Oct 2024 06:00    TPro  6.8  /  Alb  2.7[L]  /  TBili  0.4  /  DBili  x   /  AST  33  /  ALT  32  /  AlkPhos  227[H]  10    LIVER FUNCTIONS - ( 31 Oct 2024 06:00 )  Alb: 2.7 g/dL / Pro: 6.8 g/dL / ALK PHOS: 227 U/L / ALT: 32 U/L / AST: 33 U/L / GGT: x             Urinalysis Basic - ( 31 Oct 2024 16:00 )    Color: Yellow / Appearance: Clear / S.012 / pH: x  Gluc: x / Ketone: Negative mg/dL  / Bili: Negative / Urobili: 0.2 mg/dL   Blood: x / Protein: Negative mg/dL / Nitrite: Negative   Leuk Esterase: Small / RBC: 0 /HPF / WBC 6 /HPF   Sq Epi: x / Non Sq Epi: x / Bacteria: Few /HPF      Physical Exam:   Gen - NAD, Comfortable  HEENT - NCAT, EOMI, PERRLA  Neck - Supple, No limited ROM  Pulm - CTAB, No wheeze, No rhonchi, No crackles  Cardiovascular - RRR, S1S2, No murmurs  Abdomen - Soft, mildly tender in RLQ, (+) BS throughout  Extremities - No C/C/E, No calf tenderness B/L  Neuro-     Cognitive - AAOx3, follows command     Communication - Fluent, No dysarthria     Cranial Nerves - CN 2-12 intact     Motor -                     LEFT    UE - ShAB 4/5, EF 5/5, EE 5/5, WE 5/5,  5/5                    RIGHT UE - ShAB 4/5, EF 5/5, EE 5/5, WE 5/5,  5/5                    LEFT    LE - HF 4/5, KE 5/5, DF 5/5, PF 5/5                    RIGHT LE - HF 4/5, KE 5/5, DF 5/5, PF 5/5        Sensory - Intact to LT in UEs and LEs B/L      Reflexes - DTRs Intact     Coordination - FTN intact  Psychiatric - Mood stable, Affect WNL  Skin: Midline surgical dressing with drain  T spine

## 2024-11-01 NOTE — PROGRESS NOTE ADULT - ASSESSMENT
73 y/o F with PMHx of OA, HTN, gastroparesis, peripheral neuropathy, multiple prior thoracolumbar spine surgeries for congenital scoliosis [place] w/ [place]1991) and recent fall with T11-T12 fracture, s/p thoracolumbar posterior fusion for t spine fx with plastics closure on  9/29 with Dr. Stroud and Dr. Funk, c/b post op csf leak, new onset paroxysmal Afib with RVR, increased post op pain and drainage from incision site, pleural effusions, symptomatic orthostatic hypotension, urinary retention, post op anemia, and CP with spontaneous resolution. Patient was sent to [facility] rehab on 10/17, c/b AMS, sepsis with high fever, rigors, tachycardia on 10/20 found to have MSSA bacteremia with right upper lobe infiltrate, and UTI. CT spine no report of collection. Urine +ve E coli, transferred back to [facility] on 10/23 for MICHAEL, advanced spine imaging, and further management, found to have paraspinal abscess on MR imaging, S/p paraspinal collection aspiration on 10/25, culture growing gram positive cocci in pairs, with rare staph aureus. Neurosurgery advised no acute surgical revision necessary, repeat BCx negative. To continue long-term IV cefazolin via PICC until 12/12/24 (6 wks) with eventual transition to PO Duricef long term/indefinitely as per ID. MICHAEL was negative for valvular vegetations but did reveal incidental PFO. Hospital course significant for recurrent fevers, A fib, started AC. Now at [facility] for rehab.     MSSA Bacteremia   - s/p paraspinal collection aspiration 10/25 with Dr. Guerra  - continue Cefazolin 2g q8h via PICC until 12/12, then start Duricef 500mg bid indefinitely     Back Pain  - S/P T9-L3 open fusion with PSO/lag screw partial corpectomy/interbody with complex plastic closure (w/ Dr. Stroud and Dr. Tinajero on 9/29  - TLSO when OOB  - PT/OT per PMR  - Pain management per PMR    Hypertension  Orthostasis   - monitor vitals  - chlorthalidone on hold  - discontinue losartan 100mg (11/2-)  - trial of IVF and midodrine this AM for hypotension  - on metoprolol 50mg bid for rate control with holding parameters    A-fib  - continue Eliquis AC   - lopressor 50mg bid    Anemia   - monitor     DVT Prophylaxis:  - Eliquis

## 2024-11-02 PROCEDURE — 99232 SBSQ HOSP IP/OBS MODERATE 35: CPT

## 2024-11-02 PROCEDURE — 99232 SBSQ HOSP IP/OBS MODERATE 35: CPT | Mod: GC

## 2024-11-02 RX ADMIN — HYDROMORPHONE HYDROCHLORIDE 4 MILLIGRAM(S): 2 TABLET ORAL at 23:19

## 2024-11-02 RX ADMIN — HYDROMORPHONE HYDROCHLORIDE 4 MILLIGRAM(S): 2 TABLET ORAL at 07:55

## 2024-11-02 RX ADMIN — Medication 10 MILLIGRAM(S): at 14:46

## 2024-11-02 RX ADMIN — ACETAMINOPHEN 500MG 975 MILLIGRAM(S): 500 TABLET, COATED ORAL at 05:46

## 2024-11-02 RX ADMIN — Medication 100 MILLIGRAM(S): at 14:47

## 2024-11-02 RX ADMIN — ACETAMINOPHEN 500MG 975 MILLIGRAM(S): 500 TABLET, COATED ORAL at 15:15

## 2024-11-02 RX ADMIN — METOPROLOL TARTRATE 50 MILLIGRAM(S): 100 TABLET, FILM COATED ORAL at 17:12

## 2024-11-02 RX ADMIN — ACETAMINOPHEN 500MG 975 MILLIGRAM(S): 500 TABLET, COATED ORAL at 14:46

## 2024-11-02 RX ADMIN — METOPROLOL TARTRATE 50 MILLIGRAM(S): 100 TABLET, FILM COATED ORAL at 05:49

## 2024-11-02 RX ADMIN — PREGABALIN 100 MILLIGRAM(S): 75 CAPSULE ORAL at 14:46

## 2024-11-02 RX ADMIN — Medication 1 TABLET(S): at 11:38

## 2024-11-02 RX ADMIN — Medication 10 MILLIGRAM(S): at 05:46

## 2024-11-02 RX ADMIN — Medication 100 MILLIGRAM(S): at 21:14

## 2024-11-02 RX ADMIN — Medication 10 MILLIGRAM(S): at 21:14

## 2024-11-02 RX ADMIN — ACETAMINOPHEN 500MG 975 MILLIGRAM(S): 500 TABLET, COATED ORAL at 23:30

## 2024-11-02 RX ADMIN — Medication 40 MILLIGRAM(S): at 21:12

## 2024-11-02 RX ADMIN — Medication 128 MILLIGRAM(S): at 17:26

## 2024-11-02 RX ADMIN — Medication 2 TABLET(S): at 21:12

## 2024-11-02 RX ADMIN — HYDROMORPHONE HYDROCHLORIDE 4 MILLIGRAM(S): 2 TABLET ORAL at 07:20

## 2024-11-02 RX ADMIN — CHLORHEXIDINE GLUCONATE 1 APPLICATION(S): 1.2 RINSE ORAL at 21:18

## 2024-11-02 RX ADMIN — APIXABAN 5 MILLIGRAM(S): 2.5 TABLET, FILM COATED ORAL at 05:47

## 2024-11-02 RX ADMIN — MONTELUKAST SODIUM 10 MILLIGRAM(S): 10 TABLET ORAL at 11:37

## 2024-11-02 RX ADMIN — PREGABALIN 100 MILLIGRAM(S): 75 CAPSULE ORAL at 21:16

## 2024-11-02 RX ADMIN — PRAMIPEXOLE 0.75 MILLIGRAM(S): 1.5 TABLET, EXTENDED RELEASE ORAL at 21:12

## 2024-11-02 RX ADMIN — Medication 100 MILLIGRAM(S): at 05:46

## 2024-11-02 RX ADMIN — Medication 20 MILLIGRAM(S): at 11:37

## 2024-11-02 RX ADMIN — ACETAMINOPHEN 500MG 975 MILLIGRAM(S): 500 TABLET, COATED ORAL at 21:12

## 2024-11-02 RX ADMIN — PREGABALIN 100 MILLIGRAM(S): 75 CAPSULE ORAL at 05:49

## 2024-11-02 RX ADMIN — Medication 128 MILLIGRAM(S): at 05:49

## 2024-11-02 RX ADMIN — APIXABAN 5 MILLIGRAM(S): 2.5 TABLET, FILM COATED ORAL at 17:12

## 2024-11-02 NOTE — PROGRESS NOTE ADULT - SUBJECTIVE AND OBJECTIVE BOX
PROGRESS NOTE:     Patient is a 74y old  Female who presents with a chief complaint of MSSA Bacteremia 2/2 paraspinal abscess s/p thoracolumbar (T9-L3) posterior fusion of T Spine revision (2024 10:50)          SUBJECTIVE & OBJECTIVE:   Pt seen and examined at bedside in AM. feels better today  no overnight events.       REVIEW OF SYSTEMS: remaining ROS negative     PHYSICAL EXAM:  VITALS:  Vital Signs Last 24 Hrs  T(C): 37.2 (2024 08:00), Max: 37.4 (2024 05:45)  T(F): 98.9 (2024 08:00), Max: 99.4 (2024 05:45)  HR: 86 (2024 08:00) (82 - 95)  BP: 135/76 (2024 08:00) (103/63 - 145/76)  BP(mean): --  RR: 15 (2024 08:00) (15 - 16)  SpO2: 97% (2024 08:00) (94% - 97%)    Parameters below as of 2024 08:00  Patient On (Oxygen Delivery Method): room air          GENERAL: NAD,  no increased WOB  HEAD:  Atraumatic, Normocephalic  EYES: EOMI, conjunctiva and sclera clear  ENMT: Moist mucous membranes  NECK: Supple, No JVD  NERVOUS SYSTEM:  Alert & Oriented    CHEST/LUNG: Clear to auscultation bilaterally; No rales, rhonchi, wheezing  HEART: Regular rate and rhythm  ABDOMEN: Soft, Nontender, Nondistended; Bowel sounds present  EXTREMITIES: No clubbing, cyanosis, calf tenderness or edema b/l      MEDICATIONS  (STANDING):  acetaminophen     Tablet .. 975 milliGRAM(s) Oral every 8 hours  apixaban 5 milliGRAM(s) Oral every 12 hours  atorvastatin 40 milliGRAM(s) Oral at bedtime  ceFAZolin   IVPB 2000 milliGRAM(s) IV Intermittent every 8 hours  chlorhexidine 2% Cloths 1 Application(s) Topical daily  cyclobenzaprine 10 milliGRAM(s) Oral three times a day  DULoxetine 20 milliGRAM(s) Oral daily  ergocalciferol 79082 Unit(s) Oral <User Schedule>  erythromycin    ethylsuccinate Suspension 40 mG/mL 128 milliGRAM(s) Oral every 12 hours  metoprolol tartrate 50 milliGRAM(s) Oral two times a day  montelukast 10 milliGRAM(s) Oral daily  multivitamin 1 Tablet(s) Oral daily  polyethylene glycol 3350 17 Gram(s) Oral two times a day  pramipexole 0.75 milliGRAM(s) Oral <User Schedule>  pregabalin 100 milliGRAM(s) Oral every 8 hours  senna 2 Tablet(s) Oral at bedtime  Vonoprazan (Voquezna) 10mg 1 Tablet(s) 1 Tablet(s) Oral daily    MEDICATIONS  (PRN):  benzocaine/menthol Lozenge 1 Lozenge Oral every 3 hours PRN Mouth Sores  HYDROmorphone   Tablet 4 milliGRAM(s) Oral every 8 hours PRN Severe Pain (7 - 10)  melatonin 6 milliGRAM(s) Oral at bedtime PRN Insomnia      Allergies    Dilantin (Urticaria)  penicillins (Urticaria)    Intolerances    erythromycin (Stomach Upset; Vomiting; Nausea)            LABS:                           9.4    6.81  )-----------( 467      ( 31 Oct 2024 06:00 )             30.3     10-31    137  |  100  |  23  ----------------------------<  111[H]  4.5   |  27  |  0.80    Ca    9.7      31 Oct 2024 06:00    TPro  6.8  /  Alb  2.7[L]  /  TBili  0.4  /  DBili  x   /  AST  33  /  ALT  32  /  AlkPhos  227[H]  10-31      Urinalysis Basic - ( 31 Oct 2024 16:00 )    Color: Yellow / Appearance: Clear / S.012 / pH: x  Gluc: x / Ketone: Negative mg/dL  / Bili: Negative / Urobili: 0.2 mg/dL   Blood: x / Protein: Negative mg/dL / Nitrite: Negative   Leuk Esterase: Small / RBC: 0 /HPF / WBC 6 /HPF   Sq Epi: x / Non Sq Epi: x / Bacteria: Few /HPF      CAPILLARY BLOOD GLUCOSE                    RECENT CULTURES:          RADIOLOGY & ADDITIONAL TESTS:

## 2024-11-02 NOTE — PROGRESS NOTE ADULT - ASSESSMENT
75 y/o F with PMHx of OA, HTN, gastroparesis, peripheral neuropathy, multiple prior thoracolumbar spine surgeries for congenital scoliosis [place] >[place] w/ removal of hardware (1991) and recent fall with T11-T12 fracture, s/p thoracolumbar posterior fusion for t spine fx with plastics closure on  9/29 with Dr. Stroud and Dr. Funk, c/b post op csf leak, new onset paroxysmal Afib with RVR, increased post op pain and drainage from incision site, pleural effusions, symptomatic orthostatic hypotension, urinary retention, post op anemia, and CP with spontaneous resolution. Patient was sent to [facility] rehab on 10/17, c/b AMS, sepsis with high fever, rigors, tachycardia on 10/20 found to have MSSA bacteremia with right upper lobe infiltrate, and UTI. CT spine no report of collection. Urine +ve E coli, transferred back to [facility] on 10/23 for MICHAEL, advanced spine imaging, and further management, found to have paraspinal abscess on MR imaging, S/p paraspinal collection aspiration on 10/25, culture growing gram positive cocci in pairs, with rare staph aureus. Neurosurgery advised no acute surgical revision necessary, repeat BCx negative. To continue long-term IV cefazolin via PICC until 12/12/24 (6 wks) with eventual transition to PO Duricef long term/indefinitely as per ID. MICHAEL was negative for valvular vegetations but did reveal incidental PFO. Hospital course significant for recurrent fevers, A fib (resolved) with source control and electrolyte correction, initiated on DOAc given elevated [id number]. Now admitted to [facility] after for initiation of a multidisciplinary rehab program consisting focused on functional mobility, transfers and ADLs.    #Paraspinal abscess with Sepsis due to methicillin susceptible Staphylococcus aureus.   - 10/20 Bcx MSSA, 10/21 no growth at 24hrs  - CXR with old rib fracture and small RUL infiltrate  - 10/20 +UA for Ecoli, follow urine culture  - Lactic acidosis resolved, lactate 1.6 10/23  - 10/23 Na 134 K 3.3 Phos 1.5  - MRI: rim-enhancing heterogeneous fluid collection suspicious for an abscess; culture growing GPC in pairs and rare staph aureus   - TTE: no evidence of vegetations  - No Acute surgical intervention per neurosurgery   - S/p paraspinal collection aspiration with paraspinal abscess drain placed with Dr. Guerra 10/25  - Repeat daily Bcx- negative  - Monitor WBC and fever curve  - S/p PICC insertion 10/28  - TLSO when OOB  - Cefazolin 2000mg IV q8 until 12/12/24 via PICC- until 12/12/24-> Start PO Duricef 500mg BID on 12/13/24 - long term/indefinitely   - Weekly CBC Diff BMP ESR CRP; emailed to [email or web address]  - (10/31) CRP 56, ESR 95       Comprehensive Multidisciplinary Rehab Program:  - Comprehensive rehab program, 3 hours a day, 5 days a week.  - PT 2hr/day: Focused on improving strength, endurance, coordination, balance, functional mobility, and transfers  - OT 1hr/day: Focused on improving strength, fine motor skills, coordination, posture and ADLs.    - Activity Limitations: Decreased social, vocational and leisure activities, decreased self care and ADLs, decreased mobility, decreased ability to manage household and finances.     -----------------------------------------------------------------------------------  Concurrent Medical Problems    #Paroxysmal atrial fibrillation.   - 10/25 ECG and exam notable for irregular rate and rhythm  - Metoprolol to 25 BID--> Increased to 50 mg po x 2 daily (10/30)   - S/p heparin gtt 3500mg IV q6 for anticoagulation  - EKG - tachy to 110s this am in the setting of pain    #Acute on chronic low back pain.   - S/P T9-L3 open fusion with PSO/lag screw partial corpectomy/interbody with complex plastic closure (w/ Dr. Stroud and Dr. Tinajero on 9/29  - TLSO when OOB  - Pain management: Tylenol 975mg ATC TID,  Duloxetine 20mg daily, Lyrica 100mg TID , Flexeril 10mg TID standing,   - Hydromorphone 4 mg PO q8h PRN  - Eliquis 5mg BID    #HTN  #Orthostatic Hypotension  #HLD  - 10/1 TTE: EF 61%  - S/P 1L IVF bolus (10/13) for symptomatic hypotension with good effect  - Metoprolol to 25 BID--> 50 mg po x 2 daily (10/30)  - Atorvastatin 40mg HS  - Losartan 100 mg daily--> D/C ed (11/02)   - Chlorthalidone 25 mg daily on hold  (restart if SBP is consistently above 140 per cardio)  - Midodrine 7.5 mg on hold--> Received 2.5 mg this am   - Hospitalist to adjust meds     # Postoperative anemia  - S/p PRBCs intraoperatively for T spine fusion   - Hgb stable with no evidence of active bleed at this time   - 10/30- Hgb 8.6/ Hct 28.9, (10/31) 9.4/30.3    - Monitor H/H; transfuse for Hgb <7    #SILVANA (obstructive sleep apnea).   - Transient desaturations on RA    - Nocturnal 2L O2 and PRN during day during naps, monitor sats   - F/u with PCP outpatient as may benefit from sleep study and CPAP.    #Restless legs syndrome (RLS).   - Pramipexole 0.75mg daily     #Seasonal allergies.   - Montelukast 10mg daily    #Liver cyst.  -10/10 CT Abd/pelvis- There is a large cyst, stable since prior exam. There are small hypodense foci on segment II and segment I too small to characterize, unchanged since prior  -F/u outpatient     #Elevated serum alkaline phosphatase level.   - Hi serum alk phos levels 247 --> 228 --> 172 --> 233 --> 239--> 240--> 247--> 234---> 276 (10/31) 227  - Monitor serum alk phos levels.    #Gastritis.   - Vonoprazan 10mg daily  - Erythromycin 128mg oral fluid BID    #Mood/Cognition:  Neuropsychology consult PRN    #Sleep:   Maintain quiet hours and low stim environment.  Melatonin 6mg HS PRN to maximize participation in therapy during the day.     #GI/Bowel:  Senna QHS  Miralax PRN Daily    #/Bladder:   - PVR q 8 hours (SC if > 400)    #Skin/Pressure Injury:   - Skin assessment on admission: Midline sine dressing with drain in place, with minimal drainage. No pressure injuries.    #Diet/Supplement  Current Diet: Regular- Dash diet   -Ergocalciferol 03938D weekly   -Multivitamin daily     #Precautions / PROPHYLAXIS:   - Falls,  Spinal  - ortho: Weight bearing status: WBAT, TLSO brace OOB  - Lungs: Incentive Spirometer   - DVT ppx: SCDs, TEDs, Eliquis 5mg BID  - Pressure injury/Skin:  OOB to Chair, PT/OT      ---------------  Code Status: FULL  Emergency Contact:    Outpatient Follow-up (Specialty/Name of physician):    Fran Carr  Internal Medicine  [address] [facility], [address] [place] [id number]  Phone: [phone]  Fax: [phone]  Follow Up Time:     Kaushal Waterman  Infectious Disease  [address] [place] [address]  Phone: [phone]  Follow Up Time:  [facility]  Neurosurgery  Referral Assistance Program    Guille Lance  [facility]  ORTHOSURG [address] [facility]  Scheduled Appointment: 11/22/2024    Catalino Cotto  [facility]  WEIGHTMGMT  [facility]  Scheduled Appointment: 12/05/2024    Haydee Bernstein  [facility]  GASTRO [address]  Scheduled Appointment: 01/06/2025      [facility] - Infectious Disease  Infectious Disease  [address], [place]ious [place] [address]  Phone: [phone]

## 2024-11-02 NOTE — PROGRESS NOTE ADULT - SUBJECTIVE AND OBJECTIVE BOX
Patient is a 74y old  Female who presents with a chief complaint of Fusion of spine    HPI:  73 y/o F with PMHx of OA, HTN, gastroparesis, peripheral neuropathy, multiple prior thoracolumbar spine surgeries for congenital scoliosis done >[place] years ago [place] of [place] w/ removal of hardware (1991) and recent fall with T11-T12 fracture, s/p thoracolumbar posterior fusion for t spine fx with plastics closure on  9/29 with Dr. Stroud and Dr. Funk, c/b post op csf leak, new onset paroxysmal Afib with RVR, increased post op pain and drainage from incision site, pleural effusions, symptomatic orthostatic hypotension, urinary retention, post op anemia, and CP with spontaneous resolution. Patient was sent to [facility] rehab on 10/17, c/b AMS, sepsis with high fever, rigors, tachycardia on 10/20 found to have MSSA bacteremia with right upper lobe infiltrate, and UTI. CT spine no report of collection. Urine +ve E coli, transferred back to [facility] on 10/23 for MICHAEL, advanced spine imaging, and further management, found to have paraspinal abscess on MR imaging, S/p paraspinal collection aspiration on 10/25, culture growing gram positive cocci in pairs, with rare staph aureus. Neurosurgery advised no acute surgical revision necessary, repeat BCx negative. To continue long-term IV cefazolin via PICC until 12/12/24 (6 wks) with eventual transition to PO Duricef long term/indefinitely as per ID. MICHAEL was negative for valvular vegetations but did reveal incidental PFO. Hospital course significant for recurrent fevers, A fib (resolved) with source control and electrolyte correction, initiated on DOAc given elevated [id number]. Patient optimized and was evaluated by PM&R and therapy for functional deficits, gait/ADL impairments and acute rehabilitation was recommended. Patient was cleared for discharge to [facility] IRU on 10/30/24.   (30 Oct 2024 13:05)      Subjective:   - Pt seen at bedside in good spirits. Feels sleepy and lethargic, BP is low   - Slept well last night, no overnight events   - Denies any pain at this time   - LBM (11/01), voiding without issues, Last PVRs are 126/88/121, No SC needed  - Tolerating and participating in daily therapy, sleepy this morning   - TLSO brace when out od bed   - Pt denies headache, Palpations, Chest pain, SOB, abdomin pain, NVD, dysuria       MEDICATIONS  (STANDING):  acetaminophen     Tablet .. 975 milliGRAM(s) Oral every 8 hours  apixaban 5 milliGRAM(s) Oral every 12 hours  atorvastatin 40 milliGRAM(s) Oral at bedtime  ceFAZolin   IVPB 2000 milliGRAM(s) IV Intermittent every 8 hours  chlorhexidine 2% Cloths 1 Application(s) Topical daily  cyclobenzaprine 10 milliGRAM(s) Oral three times a day  DULoxetine 20 milliGRAM(s) Oral daily  ergocalciferol 85774 Unit(s) Oral <User Schedule>  erythromycin    ethylsuccinate Suspension 40 mG/mL 128 milliGRAM(s) Oral every 12 hours  losartan 100 milliGRAM(s) Oral daily  metoprolol tartrate 50 milliGRAM(s) Oral two times a day  montelukast 10 milliGRAM(s) Oral daily  multivitamin 1 Tablet(s) Oral daily  polyethylene glycol 3350 17 Gram(s) Oral two times a day  pramipexole 0.75 milliGRAM(s) Oral <User Schedule>  pregabalin 100 milliGRAM(s) Oral every 8 hours  senna 2 Tablet(s) Oral at bedtime  sodium chloride 0.9%. 500 milliLiter(s) (100 mL/Hr) IV Continuous <Continuous>  Vonoprazan (Voquezna) 10mg 1 Tablet(s) 1 Tablet(s) Oral daily    MEDICATIONS  (PRN):  benzocaine/menthol Lozenge 1 Lozenge Oral every 3 hours PRN Mouth Sores  HYDROmorphone   Tablet 4 milliGRAM(s) Oral every 8 hours PRN Severe Pain (7 - 10)  melatonin 6 milliGRAM(s) Oral at bedtime PRN Insomnia      Allergies  Dilantin (Urticaria)  penicillins (Urticaria)    Intolerances  erythromycin (Stomach Upset; Vomiting; Nausea)      VITALS  Vital Signs Last 24 Hrs  T(C): 37.2 (02 Nov 2024 08:00), Max: 37.4 (02 Nov 2024 05:45)  T(F): 98.9 (02 Nov 2024 08:00), Max: 99.4 (02 Nov 2024 05:45)  HR: 86 (02 Nov 2024 08:00) (82 - 95)  BP: 135/76 (02 Nov 2024 08:00) (103/63 - 145/76)  RR: 15 (02 Nov 2024 08:00) (15 - 16)  SpO2: 97% (02 Nov 2024 08:00) (94% - 97%)      RECENT LABS:                   9.4    6.81  )-----------( 467      ( 31 Oct 2024 06:00 )             30.3     10-31    137  |  100  |  23  ----------------------------<  111[H]  4.5   |  27  |  0.80    Ca    9.7      31 Oct 2024 06:00    TPro  6.8  /  Alb  2.7[L]  /  TBili  0.4  /  DBili  x   /  AST  33  /  ALT  32  /  AlkPhos  227[H]  10-31    LIVER FUNCTIONS - ( 31 Oct 2024 06:00 )  Alb: 2.7 g/dL / Pro: 6.8 g/dL / ALK PHOS: 227 U/L / ALT: 32 U/L / AST: 33 U/L / GGT: x             Physical Exam:   Gen - NAD, Comfortable  HEENT - NCAT, EOMI, PERRLA  Neck - Supple, No limited ROM  Pulm - CTAB, No crackles  Cardiovascular - RRR, S1S2  Abdomen - Soft, NT, ND  Extremities - No C/C/E, No calf tenderness B/L  Neuro-  AAOx3, follows command, Stable, no new changes   Psychiatric - Mood stable, Affect WNL  Skin: Midline surgical dressing with drain T spine

## 2024-11-02 NOTE — PROGRESS NOTE ADULT - ASSESSMENT
73 y/o F with PMHx of OA, HTN, gastroparesis, peripheral neuropathy, multiple prior thoracolumbar spine surgeries for congenital scoliosis [place] >[place] of [place] w/ removal of hardware (1991) and recent fall with T11-T12 fracture, s/p thoracolumbar posterior fusion for t spine fx with plastics closure on  9/29 with Dr. Stroud and Dr. Funk, c/b post op csf leak, new onset paroxysmal Afib with RVR, increased post op pain and drainage from incision site, pleural effusions, symptomatic orthostatic hypotension, urinary retention, post op anemia, and CP with spontaneous resolution. Patient was sent to [facility] rehab on 10/17, c/b AMS, sepsis with high fever, rigors, tachycardia on 10/20 found to have MSSA bacteremia with right upper lobe infiltrate, and UTI. CT spine no report of collection. Urine +ve E coli, transferred back to [facility] on 10/23 for MICHAEL, advanced spine imaging, and further management, found to have paraspinal abscess on MR imaging, S/p paraspinal collection aspiration on 10/25, culture growing gram positive cocci in pairs, with rare staph aureus. Neurosurgery advised no acute surgical revision necessary, repeat BCx negative. To continue long-term IV cefazolin via PICC until 12/12/24 (6 wks) with eventual transition to PO Duricef long term/indefinitely as per ID. MICHAEL was negative for valvular vegetations but did reveal incidental PFO. Hospital course significant for recurrent fevers, A fib, started AC. Now at [facility] for rehab.     MSSA Bacteremia   - s/p paraspinal collection aspiration 10/25 with Dr. Guerra  - continue Cefazolin 2g q8h via PICC until 12/12, then start Duricef 500mg bid indefinitely     Back Pain  - S/P T9-L3 open fusion with PSO/lag screw partial corpectomy/interbody with complex plastic closure (w/ Dr. Stroud and Dr. Tinajero on 9/29  - TLSO when OOB  - PT/OT per PMR  - Pain management per PMR    Hypertension  Orthostasis   - monitor vitals  - chlorthalidone on hold  - discontinue losartan 100mg (11/2-)  - on metoprolol 50mg bid for rate control with holding parameters. can increase if needed for Bp optimization and rate control    A-fib  - continue Eliquis AC   - lopressor 50mg bid    Anemia   - monitor     DVT Prophylaxis:  - Eliquis

## 2024-11-03 PROCEDURE — 99232 SBSQ HOSP IP/OBS MODERATE 35: CPT

## 2024-11-03 PROCEDURE — 99232 SBSQ HOSP IP/OBS MODERATE 35: CPT | Mod: GC

## 2024-11-03 RX ORDER — PRAMIPEXOLE 1.5 MG/1
0.75 TABLET, EXTENDED RELEASE ORAL
Refills: 0 | Status: DISCONTINUED | OUTPATIENT
Start: 2024-11-03 | End: 2024-11-20

## 2024-11-03 RX ADMIN — METOPROLOL TARTRATE 50 MILLIGRAM(S): 100 TABLET, FILM COATED ORAL at 05:08

## 2024-11-03 RX ADMIN — ACETAMINOPHEN 500MG 975 MILLIGRAM(S): 500 TABLET, COATED ORAL at 06:08

## 2024-11-03 RX ADMIN — HYDROMORPHONE HYDROCHLORIDE 4 MILLIGRAM(S): 2 TABLET ORAL at 00:14

## 2024-11-03 RX ADMIN — ACETAMINOPHEN 500MG 975 MILLIGRAM(S): 500 TABLET, COATED ORAL at 05:08

## 2024-11-03 RX ADMIN — Medication 1 TABLET(S): at 12:32

## 2024-11-03 RX ADMIN — Medication 10 MILLIGRAM(S): at 14:49

## 2024-11-03 RX ADMIN — Medication 2 TABLET(S): at 21:35

## 2024-11-03 RX ADMIN — PREGABALIN 100 MILLIGRAM(S): 75 CAPSULE ORAL at 14:50

## 2024-11-03 RX ADMIN — APIXABAN 5 MILLIGRAM(S): 2.5 TABLET, FILM COATED ORAL at 05:08

## 2024-11-03 RX ADMIN — HYDROMORPHONE HYDROCHLORIDE 4 MILLIGRAM(S): 2 TABLET ORAL at 19:08

## 2024-11-03 RX ADMIN — Medication 10 MILLIGRAM(S): at 05:11

## 2024-11-03 RX ADMIN — ACETAMINOPHEN 500MG 975 MILLIGRAM(S): 500 TABLET, COATED ORAL at 14:49

## 2024-11-03 RX ADMIN — ACETAMINOPHEN 500MG 975 MILLIGRAM(S): 500 TABLET, COATED ORAL at 21:34

## 2024-11-03 RX ADMIN — PREGABALIN 100 MILLIGRAM(S): 75 CAPSULE ORAL at 21:35

## 2024-11-03 RX ADMIN — ACETAMINOPHEN, DIPHENHYDRAMINE HCL, PHENYLEPHRINE HCL 6 MILLIGRAM(S): 325; 25; 5 TABLET ORAL at 00:27

## 2024-11-03 RX ADMIN — Medication 100 MILLIGRAM(S): at 21:33

## 2024-11-03 RX ADMIN — MONTELUKAST SODIUM 10 MILLIGRAM(S): 10 TABLET ORAL at 12:33

## 2024-11-03 RX ADMIN — Medication 40 MILLIGRAM(S): at 21:35

## 2024-11-03 RX ADMIN — Medication 10 MILLIGRAM(S): at 21:33

## 2024-11-03 RX ADMIN — Medication 100 MILLIGRAM(S): at 14:48

## 2024-11-03 RX ADMIN — ACETAMINOPHEN 500MG 975 MILLIGRAM(S): 500 TABLET, COATED ORAL at 15:19

## 2024-11-03 RX ADMIN — Medication 100 MILLIGRAM(S): at 05:11

## 2024-11-03 RX ADMIN — CHLORHEXIDINE GLUCONATE 1 APPLICATION(S): 1.2 RINSE ORAL at 12:28

## 2024-11-03 RX ADMIN — HYDROMORPHONE HYDROCHLORIDE 4 MILLIGRAM(S): 2 TABLET ORAL at 18:38

## 2024-11-03 RX ADMIN — APIXABAN 5 MILLIGRAM(S): 2.5 TABLET, FILM COATED ORAL at 18:31

## 2024-11-03 RX ADMIN — Medication 128 MILLIGRAM(S): at 05:08

## 2024-11-03 RX ADMIN — Medication 20 MILLIGRAM(S): at 12:33

## 2024-11-03 RX ADMIN — ACETAMINOPHEN 500MG 975 MILLIGRAM(S): 500 TABLET, COATED ORAL at 22:37

## 2024-11-03 RX ADMIN — PREGABALIN 100 MILLIGRAM(S): 75 CAPSULE ORAL at 05:08

## 2024-11-03 RX ADMIN — PRAMIPEXOLE 0.75 MILLIGRAM(S): 1.5 TABLET, EXTENDED RELEASE ORAL at 14:49

## 2024-11-03 RX ADMIN — METOPROLOL TARTRATE 50 MILLIGRAM(S): 100 TABLET, FILM COATED ORAL at 18:31
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
## 2024-11-03 NOTE — PROGRESS NOTE ADULT - ASSESSMENT
73 y/o F with PMHx of OA, HTN, gastroparesis, peripheral neuropathy, multiple prior thoracolumbar spine surgeries for congenital scoliosis [place] w/ removal of hardware (1991) and recent fall with T11-T12 fracture, s/p thoracolumbar posterior fusion for t spine fx with plastics closure on  9/29 with Dr. Stroud and Dr. Funk, c/b post op csf leak, new onset paroxysmal Afib with RVR, increased post op pain and drainage from incision site, pleural effusions, symptomatic orthostatic hypotension, urinary retention, post op anemia, and CP with spontaneous resolution. Patient was sent to [facility] rehab on 10/17, c/b AMS, sepsis with high fever, rigors, tachycardia on 10/20 found to have MSSA bacteremia with right upper lobe infiltrate, and UTI. CT spine no report of collection. Urine +ve E coli, transferred back to [facility] on 10/23 for MICHAEL, advanced spine imaging, and further management, found to have paraspinal abscess on MR imaging, S/p paraspinal collection aspiration on 10/25, culture growing gram positive cocci in pairs, with rare staph aureus. Neurosurgery advised no acute surgical revision necessary, repeat BCx negative. To continue long-term IV cefazolin via PICC until 12/12/24 (6 wks) with eventual transition to PO Duricef long term/indefinitely as per ID. MICHAEL was negative for valvular vegetations but did reveal incidental PFO. Hospital course significant for recurrent fevers, A fib, started AC. Now at [facility] for rehab.     MSSA Bacteremia   - s/p paraspinal collection aspiration 10/25 with Dr. Guerra  - continue Cefazolin 2g q8h via PICC until 12/12, then start Duricef 500mg bid indefinitely     Back Pain  - S/P T9-L3 open fusion with PSO/lag screw partial corpectomy/interbody with complex plastic closure (w/ Dr. Stroud and Dr. Tinajero on 9/29  - TLSO when OOB  - PT/OT per PMR  - Pain management per PMR    Hypertension  Orthostasis   - monitor vitals  - chlorthalidone on hold  - discontinued losartan 100mg (11/2-)  - on metoprolol 50mg bid for rate control with holding parameters. can increase if needed for Bp optimization and rate control    A-fib  - continue Eliquis AC   - lopressor 50mg bid    Anemia   - monitor     DVT Prophylaxis:  - Eliquis

## 2024-11-03 NOTE — PROGRESS NOTE ADULT - SUBJECTIVE AND OBJECTIVE BOX
PROGRESS NOTE:     Patient is a 74y old  Female who presents with a chief complaint of MSSA Bacteremia 2/2 paraspinal abscess s/p thoracolumbar (T9-L3) posterior fusion of T Spine revision (2024 10:50)          SUBJECTIVE & OBJECTIVE:   Pt seen and examined at bedside in AM.  no overnight events.       REVIEW OF SYSTEMS: remaining ROS negative     PHYSICAL EXAM:  VITALS:  Vital Signs Last 24 Hrs  T(C): 37 (2024 19:54), Max: 37 (2024 19:54)  T(F): 98.6 (2024 19:54), Max: 98.6 (2024 19:54)  HR: 98 (2024 19:54) (98 - 100)  BP: 108/69 (2024 19:54) (108/69 - 146/77)  BP(mean): --  RR: 15 (2024 19:54) (15 - 15)  SpO2: 95% (2024 19:54) (95% - 95%)    Parameters below as of 2024 19:54  Patient On (Oxygen Delivery Method): room air          GENERAL: NAD,  no increased WOB  HEAD:  Atraumatic, Normocephalic  EYES: EOMI, conjunctiva and sclera clear  ENMT: Moist mucous membranes  NECK: Supple, No JVD  NERVOUS SYSTEM:  Alert & Oriented    CHEST/LUNG: Clear to auscultation bilaterally; No rales, rhonchi, wheezing  HEART: Regular rate and rhythm  ABDOMEN: Soft, Nontender, Nondistended; Bowel sounds present  EXTREMITIES: No clubbing, cyanosis, calf tenderness or edema b/l      MEDICATIONS  (STANDING):  acetaminophen     Tablet .. 975 milliGRAM(s) Oral every 8 hours  apixaban 5 milliGRAM(s) Oral every 12 hours  atorvastatin 40 milliGRAM(s) Oral at bedtime  ceFAZolin   IVPB 2000 milliGRAM(s) IV Intermittent every 8 hours  chlorhexidine 2% Cloths 1 Application(s) Topical daily  cyclobenzaprine 10 milliGRAM(s) Oral three times a day  DULoxetine 20 milliGRAM(s) Oral daily  ergocalciferol 55408 Unit(s) Oral <User Schedule>  erythromycin    ethylsuccinate Suspension 40 mG/mL 128 milliGRAM(s) Oral every 12 hours  metoprolol tartrate 50 milliGRAM(s) Oral two times a day  montelukast 10 milliGRAM(s) Oral daily  multivitamin 1 Tablet(s) Oral daily  polyethylene glycol 3350 17 Gram(s) Oral two times a day  pramipexole 0.75 milliGRAM(s) Oral <User Schedule>  pregabalin 100 milliGRAM(s) Oral every 8 hours  senna 2 Tablet(s) Oral at bedtime  Vonoprazan (Voquezna) 10mg 1 Tablet(s) 1 Tablet(s) Oral daily    MEDICATIONS  (PRN):  benzocaine/menthol Lozenge 1 Lozenge Oral every 3 hours PRN Mouth Sores  HYDROmorphone   Tablet 4 milliGRAM(s) Oral every 8 hours PRN Severe Pain (7 - 10)  melatonin 6 milliGRAM(s) Oral at bedtime PRN Insomnia        Allergies    Dilantin (Urticaria)  penicillins (Urticaria)    Intolerances    erythromycin (Stomach Upset; Vomiting; Nausea)            LABS:                           9.4    6.81  )-----------( 467      ( 31 Oct 2024 06:00 )             30.3     10-31    137  |  100  |  23  ----------------------------<  111[H]  4.5   |  27  |  0.80    Ca    9.7      31 Oct 2024 06:00    TPro  6.8  /  Alb  2.7[L]  /  TBili  0.4  /  DBili  x   /  AST  33  /  ALT  32  /  AlkPhos  227[H]  10-31      Urinalysis Basic - ( 31 Oct 2024 16:00 )    Color: Yellow / Appearance: Clear / S.012 / pH: x  Gluc: x / Ketone: Negative mg/dL  / Bili: Negative / Urobili: 0.2 mg/dL   Blood: x / Protein: Negative mg/dL / Nitrite: Negative   Leuk Esterase: Small / RBC: 0 /HPF / WBC 6 /HPF   Sq Epi: x / Non Sq Epi: x / Bacteria: Few /HPF      CAPILLARY BLOOD GLUCOSE                    RECENT CULTURES:          RADIOLOGY & ADDITIONAL TESTS:

## 2024-11-03 NOTE — PROGRESS NOTE ADULT - ASSESSMENT
73 y/o F with PMHx of OA, HTN, gastroparesis, peripheral neuropathy, multiple prior thoracolumbar spine surgeries for congenital scoliosis [place]/ [place] of [place] (1991) and recent fall with T11-T12 fracture, s/p thoracolumbar posterior fusion for t spine fx with plastics closure on  9/29 with Dr. Stroud and Dr. Funk, c/b post op csf leak, new onset paroxysmal Afib with RVR, increased post op pain and drainage from incision site, pleural effusions, symptomatic orthostatic hypotension, urinary retention, post op anemia, and CP with spontaneous resolution. Patient was sent to [facility] rehab on 10/17, c/b AMS, sepsis with high fever, rigors, tachycardia on 10/20 found to have MSSA bacteremia with right upper lobe infiltrate, and UTI. CT spine no report of collection. Urine +ve E coli, transferred back to [facility] on 10/23 for MICHAEL, advanced spine imaging, and further management, found to have paraspinal abscess on MR imaging, S/p paraspinal collection aspiration on 10/25, culture growing gram positive cocci in pairs, with rare staph aureus. Neurosurgery advised no acute surgical revision necessary, repeat BCx negative. To continue long-term IV cefazolin via PICC until 12/12/24 (6 wks) with eventual transition to PO Duricef long term/indefinitely as per ID. MICHAEL was negative for valvular vegetations but did reveal incidental PFO. Hospital course significant for recurrent fevers, A fib (resolved) with source control and electrolyte correction, initiated on DOAc given elevated [id number]. Now admitted to [facility] after for initiation of a multidisciplinary rehab program consisting focused on functional mobility, transfers and ADLs.    #Paraspinal abscess with Sepsis due to methicillin susceptible Staphylococcus aureus.   - 10/20 Bcx MSSA, 10/21 no growth at 24hrs  - CXR with old rib fracture and small RUL infiltrate  - 10/20 +UA for Ecoli, follow urine culture  - Lactic acidosis resolved, lactate 1.6 10/23  - 10/23 Na 134 K 3.3 Phos 1.5  - MRI: rim-enhancing heterogeneous fluid collection suspicious for an abscess; culture growing GPC in pairs and rare staph aureus   - TTE: no evidence of vegetations  - No Acute surgical intervention per neurosurgery   - S/p paraspinal collection aspiration with paraspinal abscess drain placed with Dr. Guerra 10/25  - Repeat daily Bcx- negative  - Monitor WBC and fever curve  - S/p PICC insertion 10/28  - TLSO when OOB  - Cefazolin 2000mg IV q8 until 12/12/24 via PICC- until 12/12/24-> Start PO Duricef 500mg BID on 12/13/24 - long term/indefinitely   - Weekly CBC Diff BMP ESR CRP; emailed to [email or web address]  - (10/31) CRP 56, ESR 95       Comprehensive Multidisciplinary Rehab Program:  - Comprehensive rehab program, 3 hours a day, 5 days a week.  - PT 2hr/day: Focused on improving strength, endurance, coordination, balance, functional mobility, and transfers  - OT 1hr/day: Focused on improving strength, fine motor skills, coordination, posture and ADLs.    - Activity Limitations: Decreased social, vocational and leisure activities, decreased self care and ADLs, decreased mobility, decreased ability to manage household and finances.     -----------------------------------------------------------------------------------  Concurrent Medical Problems    #Paroxysmal atrial fibrillation.   - 10/25 ECG and exam notable for irregular rate and rhythm  - Metoprolol to 25 BID--> Increased to 50 mg po x 2 daily (10/30)   - S/p heparin gtt 3500mg IV q6 for anticoagulation  - EKG - tachy to 110s this am in the setting of pain    #Acute on chronic low back pain.   - S/P T9-L3 open fusion with PSO/lag screw partial corpectomy/interbody with complex plastic closure (w/ Dr. Stroud and Dr. Tinajero on 9/29  - TLSO when OOB  - Pain management: Tylenol 975mg ATC TID,  Duloxetine 20mg daily, Lyrica 100mg TID , Flexeril 10mg TID standing,   - Hydromorphone 4 mg PO q8h PRN  - Eliquis 5mg BID    #HTN  #Orthostatic Hypotension  #HLD  - 10/1 TTE: EF 61%  - S/P 1L IVF bolus (10/13) for symptomatic hypotension with good effect  - Metoprolol to 25 BID--> 50 mg po x 2 daily (10/30)  - Atorvastatin 40mg HS  - Losartan 100 mg daily--> D/C ed (11/02)   - Chlorthalidone 25 mg daily on hold  (restart if SBP is consistently above 140 per cardio)  - Midodrine 7.5 mg on hold--> Received 2.5 mg this am   - Hospitalist to adjust meds     # Postoperative anemia  - S/p PRBCs intraoperatively for T spine fusion   - Hgb stable with no evidence of active bleed at this time   - 10/30- Hgb 8.6/ Hct 28.9, (10/31) 9.4/30.3    - Transfuse for Hgb <7  - Monitor    #SILVANA (obstructive sleep apnea).   - Transient desaturations on RA    - Nocturnal 2L O2 and PRN during day during naps, monitor sats   - F/u with PCP outpatient as may benefit from sleep study and CPAP.    #Restless legs syndrome (RLS).   - Pramipexole 0.75mg daily     #Seasonal allergies.   - Montelukast 10mg daily    #Liver cyst.  -10/10 CT Abd/pelvis- There is a large cyst, stable since prior exam. There are small hypodense foci on segment II and segment I too small to characterize, unchanged since prior  -F/u outpatient     #Elevated serum alkaline phosphatase level.   - Hi serum alk phos levels 247 --> 228 --> 172 --> 233 --> 239--> 240--> 247--> 234---> 276 (10/31) 227  - Monitor serum alk phos levels.    #Gastritis.   - Vonoprazan 10mg daily  - Erythromycin 128mg oral fluid BID    #Mood/Cognition:  Neuropsychology consult PRN    #Sleep:   Maintain quiet hours and low stim environment.  Melatonin 6mg HS PRN to maximize participation in therapy during the day.     #GI/Bowel:  Senna QHS  Miralax PRN Daily    #/Bladder:   - PVR q 8 hours (SC if > 400)    #Skin/Pressure Injury:   - Skin assessment on admission: Midline sine dressing with drain in place, with minimal drainage. No pressure injuries.    #Diet/Supplement  Current Diet: Regular- Dash diet   -Ergocalciferol 57899V weekly   -Multivitamin daily     #Precautions / PROPHYLAXIS:   - Falls,  Spinal  - ortho: Weight bearing status: WBAT, TLSO brace OOB  - Lungs: Incentive Spirometer   - DVT ppx: SCDs, TEDs, Eliquis 5mg BID  - Pressure injury/Skin:  OOB to Chair, PT/OT      ---------------  Code Status: FULL  Emergency Contact:    Outpatient Follow-up (Specialty/Name of physician):    Fran Carr  Internal Medicine  [address] [facility], [address] [place] [address]  Phone: [phone]  Fax: [phone]  Follow Up Time:     Kaushal Waterman  Infectious Disease  [address] [place] [address]  Phone: [phone]  Follow Up Time:  [facility]  Neurosurgery  Referral Assistance Program    Guille Lance  [facility]  ORTHOSURG [address]  Scheduled Appointment: 11/22/2024    Catalino Cotto  [facility]  WEIGHTMGMT  [facility]  Scheduled Appointment: 12/05/2024    Haydee Bernstein  [facility]  GASTRO [address]  Scheduled Appointment: 01/06/2025      [facility] - [facility]ious Disease  Infectious Disease  [address], [place]ious [place] [address]  Phone: [phone]   73 y/o F with PMHx of OA, HTN, gastroparesis, peripheral neuropathy, multiple prior thoracolumbar spine surgeries for congenital scoliosis done >[place] w/ removal of hardware (1991) and recent fall with T11-T12 fracture, s/p thoracolumbar posterior fusion for t spine fx with plastics closure on  9/29 with Dr. Stroud and Dr. Funk, c/b post op csf leak, new onset paroxysmal Afib with RVR, increased post op pain and drainage from incision site, pleural effusions, symptomatic orthostatic hypotension, urinary retention, post op anemia, and CP with spontaneous resolution. Patient was sent to [facility] rehab on 10/17, c/b AMS, sepsis with high fever, rigors, tachycardia on 10/20 found to have MSSA bacteremia with right upper lobe infiltrate, and UTI. CT spine no report of collection. Urine +ve E coli, transferred back to [facility] on 10/23 for MICHAEL, advanced spine imaging, and further management, found to have paraspinal abscess on MR imaging, S/p paraspinal collection aspiration on 10/25, culture growing gram positive cocci in pairs, with rare staph aureus. Neurosurgery advised no acute surgical revision necessary, repeat BCx negative. To continue long-term IV cefazolin via PICC until 12/12/24 (6 wks) with eventual transition to PO Duricef long term/indefinitely as per ID. MICHAEL was negative for valvular vegetations but did reveal incidental PFO. Hospital course significant for recurrent fevers, A fib (resolved) with source control and electrolyte correction, initiated on DOAc given elevated [id number]. Now admitted to [facility] after for initiation of a multidisciplinary rehab program consisting focused on functional mobility, transfers and ADLs.    #Paraspinal abscess with Sepsis due to methicillin susceptible Staphylococcus aureus.   - 10/20 Bcx MSSA, 10/21 no growth at 24hrs  - CXR with old rib fracture and small RUL infiltrate  - 10/20 +UA for Ecoli, follow urine culture  - Lactic acidosis resolved, lactate 1.6 10/23  - 10/23 Na 134 K 3.3 Phos 1.5  - MRI: rim-enhancing heterogeneous fluid collection suspicious for an abscess; culture growing GPC in pairs and rare staph aureus   - TTE: no evidence of vegetations  - No Acute surgical intervention per neurosurgery   - S/p paraspinal collection aspiration with paraspinal abscess drain placed with Dr. Guerra 10/25. Out put is 25/25 cc over the last 2 days.   - Repeat daily Bcx- negative  - Monitor WBC and fever curve  - S/p PICC insertion 10/28  - TLSO when OOB  - Cefazolin 2000mg IV q8 until 12/12/24 via PICC- until 12/12/24-> Start PO Duricef 500mg BID on 12/13/24 - long term/indefinitely   - Weekly CBC Diff BMP ESR CRP; emailed to [email or web address]  - (10/31) CRP 56, ESR 95       Comprehensive Multidisciplinary Rehab Program:  - Comprehensive rehab program, 3 hours a day, 5 days a week.  - PT 2hr/day: Focused on improving strength, endurance, coordination, balance, functional mobility, and transfers  - OT 1hr/day: Focused on improving strength, fine motor skills, coordination, posture and ADLs.    - Activity Limitations: Decreased social, vocational and leisure activities, decreased self care and ADLs, decreased mobility, decreased ability to manage household and finances.     -----------------------------------------------------------------------------------  Concurrent Medical Problems    #Paroxysmal atrial fibrillation.   - 10/25 ECG and exam notable for irregular rate and rhythm  - Metoprolol to 25 BID--> Increased to 50 mg po x 2 daily (10/30)   - S/p heparin gtt 3500mg IV q6 for anticoagulation  - EKG - tachy to 110s this am in the setting of pain    #Acute on chronic low back pain.   - S/P T9-L3 open fusion with PSO/lag screw partial corpectomy/interbody with complex plastic closure (w/ Dr. Stroud and Dr. Tinajero on 9/29  - TLSO when OOB  - Pain management: Tylenol 975mg ATC TID,  Duloxetine 20mg daily, Lyrica 100mg TID , Flexeril 10mg TID standing,   - Hydromorphone 4 mg PO q8h PRN  - Eliquis 5mg BID    #HTN  #Orthostatic Hypotension  #HLD  - 10/1 TTE: EF 61%  - S/P 1L IVF bolus (10/13) for symptomatic hypotension with good effect  - Metoprolol to 25 BID--> 50 mg po x 2 daily (10/30)  - Atorvastatin 40mg HS  - Losartan 100 mg daily--> D/C ed (11/02)   - Chlorthalidone 25 mg daily on hold  (restart if SBP is consistently above 140 per cardio)  - Midodrine 7.5 mg on hold--> Received 2.5 mg this am   - Hospitalist to adjust meds     # Postoperative anemia  - S/p PRBCs intraoperatively for T spine fusion   - Hgb stable with no evidence of active bleed at this time   - 10/30- Hgb 8.6/ Hct 28.9, (10/31) 9.4/30.3    - Transfuse for Hgb <7  - Monitor    #SILVANA (obstructive sleep apnea).   - Transient desaturations on RA    - Nocturnal 2L O2 and PRN during day during naps, monitor sats   - F/u with PCP outpatient as may benefit from sleep study and CPAP.    #Restless legs syndrome (RLS).   - Pramipexole 0.75mg daily     #Seasonal allergies.   - Montelukast 10mg daily    #Liver cyst.  -10/10 CT Abd/pelvis- There is a large cyst, stable since prior exam. There are small hypodense foci on segment II and segment I too small to characterize, unchanged since prior  -F/u outpatient     #Elevated serum alkaline phosphatase level.   - Hi serum alk phos levels 247 --> 228 --> 172 --> 233 --> 239--> 240--> 247--> 234---> 276 (10/31) 227  - Monitor serum alk phos levels.    #Gastritis.   - Vonoprazan 10mg daily  - Erythromycin 128mg oral fluid BID    #Mood/Cognition:  Neuropsychology consult PRN    #Sleep:   Maintain quiet hours and low stim environment.  Melatonin 6mg HS PRN to maximize participation in therapy during the day.     #GI/Bowel:  Senna QHS  Miralax PRN Daily    #/Bladder:   - PVR q 8 hours (SC if > 400)    #Skin/Pressure Injury:   - Skin assessment on admission: Midline sine dressing with drain in place, with minimal drainage. No pressure injuries.    #Diet/Supplement  Current Diet: Regular- Dash diet   -Ergocalciferol 44024L weekly   -Multivitamin daily     #Precautions / PROPHYLAXIS:   - Falls,  Spinal  - ortho: Weight bearing status: WBAT, TLSO brace OOB  - Lungs: Incentive Spirometer   - DVT ppx: SCDs, TEDs, Eliquis 5mg BID  - Pressure injury/Skin:  OOB to Chair, PT/OT      ---------------  Code Status: FULL  Emergency Contact:    Outpatient Follow-up (Specialty/Name of physician):    Fran Carr  Internal Medicine  [address] [address]  Phone: [phone]  Fax: [phone]  Follow Up Time:     Kaushal Waterman  Infectious Disease  [address] [place] [address]  Phone: [phone]  Follow Up Time:  [facility]  Neurosurgery  Referral Assistance Program    Guille Lance  [facility]  ORTHOSURG [address]  Scheduled Appointment: 11/22/2024    Catalino Cotto  [facility]  WEIGHTMGMT  [facility]  Scheduled Appointment: 12/05/2024    Haydee Bernstein  [facility]  GASTRO [address]  Scheduled Appointment: 01/06/2025      [facility] - Infectious Disease  Infectious Disease  [address] [facility], [place] [address]  Phone: [phone]

## 2024-11-04 LAB
ALBUMIN SERPL ELPH-MCNC: 2.7 G/DL — LOW (ref 3.3–5)
ALP SERPL-CCNC: 194 U/L — HIGH (ref 40–120)
ALT FLD-CCNC: 17 U/L — SIGNIFICANT CHANGE UP (ref 10–45)
ANION GAP SERPL CALC-SCNC: 6 MMOL/L — SIGNIFICANT CHANGE UP (ref 5–17)
AST SERPL-CCNC: 25 U/L — SIGNIFICANT CHANGE UP (ref 10–40)
BASOPHILS # BLD AUTO: 0.05 K/UL — SIGNIFICANT CHANGE UP (ref 0–0.2)
BASOPHILS NFR BLD AUTO: 0.9 % — SIGNIFICANT CHANGE UP (ref 0–2)
BILIRUB SERPL-MCNC: 0.4 MG/DL — SIGNIFICANT CHANGE UP (ref 0.2–1.2)
BUN SERPL-MCNC: 13 MG/DL — SIGNIFICANT CHANGE UP (ref 7–23)
CALCIUM SERPL-MCNC: 8.9 MG/DL — SIGNIFICANT CHANGE UP (ref 8.4–10.5)
CHLORIDE SERPL-SCNC: 104 MMOL/L — SIGNIFICANT CHANGE UP (ref 96–108)
CO2 SERPL-SCNC: 27 MMOL/L — SIGNIFICANT CHANGE UP (ref 22–31)
CREAT SERPL-MCNC: 0.75 MG/DL — SIGNIFICANT CHANGE UP (ref 0.5–1.3)
CRP SERPL-MCNC: 50 MG/L — HIGH
EGFR: 83 ML/MIN/1.73M2 — SIGNIFICANT CHANGE UP
EOSINOPHIL # BLD AUTO: 0.12 K/UL — SIGNIFICANT CHANGE UP (ref 0–0.5)
EOSINOPHIL NFR BLD AUTO: 2.2 % — SIGNIFICANT CHANGE UP (ref 0–6)
ERYTHROCYTE [SEDIMENTATION RATE] IN BLOOD: 90 MM/HR — HIGH (ref 0–20)
GLUCOSE SERPL-MCNC: 107 MG/DL — HIGH (ref 70–99)
HCT VFR BLD CALC: 28.9 % — LOW (ref 34.5–45)
HGB BLD-MCNC: 8.6 G/DL — LOW (ref 11.5–15.5)
IMM GRANULOCYTES NFR BLD AUTO: 0.4 % — SIGNIFICANT CHANGE UP (ref 0–0.9)
LYMPHOCYTES # BLD AUTO: 1.17 K/UL — SIGNIFICANT CHANGE UP (ref 1–3.3)
LYMPHOCYTES # BLD AUTO: 21.5 % — SIGNIFICANT CHANGE UP (ref 13–44)
MCHC RBC-ENTMCNC: 26.1 PG — LOW (ref 27–34)
MCHC RBC-ENTMCNC: 29.8 G/DL — LOW (ref 32–36)
MCV RBC AUTO: 87.8 FL — SIGNIFICANT CHANGE UP (ref 80–100)
MONOCYTES # BLD AUTO: 0.45 K/UL — SIGNIFICANT CHANGE UP (ref 0–0.9)
MONOCYTES NFR BLD AUTO: 8.3 % — SIGNIFICANT CHANGE UP (ref 2–14)
NEUTROPHILS # BLD AUTO: 3.63 K/UL — SIGNIFICANT CHANGE UP (ref 1.8–7.4)
NEUTROPHILS NFR BLD AUTO: 66.7 % — SIGNIFICANT CHANGE UP (ref 43–77)
NRBC # BLD: 0 /100 WBCS — SIGNIFICANT CHANGE UP (ref 0–0)
PLATELET # BLD AUTO: 415 K/UL — HIGH (ref 150–400)
POTASSIUM SERPL-MCNC: 3.8 MMOL/L — SIGNIFICANT CHANGE UP (ref 3.5–5.3)
POTASSIUM SERPL-SCNC: 3.8 MMOL/L — SIGNIFICANT CHANGE UP (ref 3.5–5.3)
PROT SERPL-MCNC: 6.7 G/DL — SIGNIFICANT CHANGE UP (ref 6–8.3)
RBC # BLD: 3.29 M/UL — LOW (ref 3.8–5.2)
RBC # FLD: 15.5 % — HIGH (ref 10.3–14.5)
SODIUM SERPL-SCNC: 137 MMOL/L — SIGNIFICANT CHANGE UP (ref 135–145)
WBC # BLD: 5.44 K/UL — SIGNIFICANT CHANGE UP (ref 3.8–10.5)
WBC # FLD AUTO: 5.44 K/UL — SIGNIFICANT CHANGE UP (ref 3.8–10.5)

## 2024-11-04 PROCEDURE — 99232 SBSQ HOSP IP/OBS MODERATE 35: CPT

## 2024-11-04 RX ADMIN — Medication 40 MILLIGRAM(S): at 21:49

## 2024-11-04 RX ADMIN — HYDROMORPHONE HYDROCHLORIDE 4 MILLIGRAM(S): 2 TABLET ORAL at 03:08

## 2024-11-04 RX ADMIN — ACETAMINOPHEN 500MG 975 MILLIGRAM(S): 500 TABLET, COATED ORAL at 06:10

## 2024-11-04 RX ADMIN — ACETAMINOPHEN 500MG 975 MILLIGRAM(S): 500 TABLET, COATED ORAL at 14:06

## 2024-11-04 RX ADMIN — HYDROMORPHONE HYDROCHLORIDE 4 MILLIGRAM(S): 2 TABLET ORAL at 14:05

## 2024-11-04 RX ADMIN — Medication 100 MILLIGRAM(S): at 14:25

## 2024-11-04 RX ADMIN — APIXABAN 5 MILLIGRAM(S): 2.5 TABLET, FILM COATED ORAL at 05:10

## 2024-11-04 RX ADMIN — ACETAMINOPHEN 500MG 975 MILLIGRAM(S): 500 TABLET, COATED ORAL at 05:10

## 2024-11-04 RX ADMIN — METOPROLOL TARTRATE 50 MILLIGRAM(S): 100 TABLET, FILM COATED ORAL at 05:10

## 2024-11-04 RX ADMIN — Medication 2 TABLET(S): at 21:49

## 2024-11-04 RX ADMIN — HYDROMORPHONE HYDROCHLORIDE 4 MILLIGRAM(S): 2 TABLET ORAL at 15:06

## 2024-11-04 RX ADMIN — Medication 10 MILLIGRAM(S): at 05:10

## 2024-11-04 RX ADMIN — CHLORHEXIDINE GLUCONATE 1 APPLICATION(S): 1.2 RINSE ORAL at 11:28

## 2024-11-04 RX ADMIN — PRAMIPEXOLE 0.75 MILLIGRAM(S): 1.5 TABLET, EXTENDED RELEASE ORAL at 15:01

## 2024-11-04 RX ADMIN — METOPROLOL TARTRATE 50 MILLIGRAM(S): 100 TABLET, FILM COATED ORAL at 17:14

## 2024-11-04 RX ADMIN — APIXABAN 5 MILLIGRAM(S): 2.5 TABLET, FILM COATED ORAL at 17:14

## 2024-11-04 RX ADMIN — Medication 100 MILLIGRAM(S): at 05:05

## 2024-11-04 RX ADMIN — PREGABALIN 100 MILLIGRAM(S): 75 CAPSULE ORAL at 21:49

## 2024-11-04 RX ADMIN — POLYETHYLENE GLYCOL 3350 17 GRAM(S): 17 POWDER, FOR SOLUTION ORAL at 17:14

## 2024-11-04 RX ADMIN — ACETAMINOPHEN 500MG 975 MILLIGRAM(S): 500 TABLET, COATED ORAL at 22:34

## 2024-11-04 RX ADMIN — Medication 10 MILLIGRAM(S): at 21:49

## 2024-11-04 RX ADMIN — PREGABALIN 100 MILLIGRAM(S): 75 CAPSULE ORAL at 14:06

## 2024-11-04 RX ADMIN — PREGABALIN 100 MILLIGRAM(S): 75 CAPSULE ORAL at 05:11

## 2024-11-04 RX ADMIN — Medication 100 MILLIGRAM(S): at 21:47

## 2024-11-04 RX ADMIN — Medication 128 MILLIGRAM(S): at 17:14

## 2024-11-04 RX ADMIN — ACETAMINOPHEN 500MG 975 MILLIGRAM(S): 500 TABLET, COATED ORAL at 15:06

## 2024-11-04 RX ADMIN — HYDROMORPHONE HYDROCHLORIDE 4 MILLIGRAM(S): 2 TABLET ORAL at 04:01

## 2024-11-04 RX ADMIN — Medication 1 TABLET(S): at 11:24

## 2024-11-04 RX ADMIN — ACETAMINOPHEN 500MG 975 MILLIGRAM(S): 500 TABLET, COATED ORAL at 21:49

## 2024-11-04 RX ADMIN — MONTELUKAST SODIUM 10 MILLIGRAM(S): 10 TABLET ORAL at 11:24

## 2024-11-04 RX ADMIN — Medication 20 MILLIGRAM(S): at 11:24

## 2024-11-04 NOTE — PROGRESS NOTE ADULT - SUBJECTIVE AND OBJECTIVE BOX
HPI:  Mrs. Haley French is a 74-year-old female patient with past medical history of OA, HTN, gastroparesis, peripheral neuropathy, multiple prior thoracolumbar spine surgeries for congenital scoliosis [place] >[place] of [place] w/ removal of hardware (1991) who presented to [facility] on 9/28/24 with back pain and left hip pain s/p mechanical fall. CT imaging was performed and reported a T12 three column spine fracture-malalignment with fracture extending to the adjacent right 12th posterior rib and left T12-L1 facet joint and acute, displaced fracture of the left eighth lateral rib. Chronic appearing bilateral rib deformities. Surgical management was recommended and she is S/P T9-L3 open fusion with PSO/lag screw partial corpectomy/interbody by Dr. Stroud, Neurosurgeon with complex Plastic Surgery closure by Dr. Elias on 9/29/24. Post op course c/b CSK leak s/p repair, new onset paroxsymal Afib with RVR,  increased pain and drainage from surgical incision, development of pleural effusions, symptomatic orthostatic hypotension, urinary retention, post op anemia, and chest pain with spontaneous resolution. Cardiac work up negative. Patient was evaluated by PM&R and therapy for functional deficits, gait/ADL impairments and acute rehabilitation was recommended. Patient was cleared for discharge to [facility] IRF on 10/16/24.    Her admission was remarkable for a developing a fever of 103 with tachycardia and subjective fever/chills. Sepsis w/up was initiated and found to have small right upper lobe infiltrate, UTI, and MSSA bacteremia. CTH negative. Hospitalist and ID were consulted and per G+ bacteremia guidelines was recommended completing infectious workup, which includes MICHAEL which is not available at [facility]. Patient also required PICC line placement for long term ABx and a transfer request initiated from [place] to [facility] for MICHAEL and completion of bacteremia workup/treatment. All other medical co-morbidities were stable. Patient was deemed medically stable for discharge to [facility] medicine on 10/21/24 and was subsequently transferred on 10/23/24 for MICHAEL, advanced spine imaging, and further management. She was found to have a paraspinal abscess on MR imaging and is s/p paraspinal collection aspiration on 10/25/24. Culture grew gram positive cocci in pairs, with rare staph aureus. Neurosurgery advised and no acute surgical revision was deemed necessary. Repeat BCx was negative. Recommendation to continue long-term IV cefazolin via PICC until 12/12/24 (6 wks) with eventual transition to PO Duricef long term/indefinitely per ID. MICHAEL was negative for valvular vegetations but did reveal incidental PFO. Hospital course was significant for recurrent fevers, A fib (resolved) with source control and electrolyte correction, initiated on DOAc given elevated [id number]. Patient was evaluated by PM&R and therapy for functional deficits, gait/ADL impairments and acute rehabilitation was recommended. Patient was cleared for discharge to [facility] IRF on 10/30/24.   (30 Oct 2024 13:05)    ___________________________________________________________________________    SUBJECTIVE/ROS  Patient was seen and evaluated at bedside today.  Reported no overnight events and is in no acute distress.  Case to be evaluated at Interdisciplinary Team meeting tomorrow.  Eager to participate on the recommended rehabilitation program.  Denies any CP, SOB, POLLARD, palpitations, fever, chills, body aches, cough, congestion, or any other symptoms at this time.   ___________________________________________________________________________    VITALS  T(C): 36.8 (11-04-24 @ 08:16), Max: 37.3 (11-03-24 @ 19:40)  HR: 88 (11-04-24 @ 17:11) (85 - 95)  BP: 143/76 (11-04-24 @ 17:11) (126/77 - 157/75)  RR: 16 (11-04-24 @ 08:16) (16 - 16)  SpO2: 93% (11-04-24 @ 17:11) (93% - 95%)  ___________________________________________________________________________    LABS                          8.6    5.44  )-----------( 415      ( 04 Nov 2024 06:25 )             28.9       11-04    137  |  104  |  13  ----------------------------<  107[H]  3.8   |  27  |  0.75    Ca    8.9      04 Nov 2024 06:25    TPro  6.7  /  Alb  2.7[L]  /  TBili  0.4  /  DBili  x   /  AST  25  /  ALT  17  /  AlkPhos  194[H]  11-04    ___________________________________________________________________________    MEDICATIONS  (STANDING):  acetaminophen     Tablet .. 975 milliGRAM(s) Oral every 8 hours  apixaban 5 milliGRAM(s) Oral every 12 hours  atorvastatin 40 milliGRAM(s) Oral at bedtime  ceFAZolin   IVPB 2000 milliGRAM(s) IV Intermittent every 8 hours  chlorhexidine 2% Cloths 1 Application(s) Topical daily  cyclobenzaprine 10 milliGRAM(s) Oral three times a day  DULoxetine 20 milliGRAM(s) Oral daily  ergocalciferol 81976 Unit(s) Oral <User Schedule>  erythromycin    ethylsuccinate Suspension 40 mG/mL 128 milliGRAM(s) Oral every 12 hours  metoprolol tartrate 50 milliGRAM(s) Oral two times a day  montelukast 10 milliGRAM(s) Oral daily  multivitamin 1 Tablet(s) Oral daily  polyethylene glycol 3350 17 Gram(s) Oral two times a day  pramipexole 0.75 milliGRAM(s) Oral <User Schedule>  pregabalin 100 milliGRAM(s) Oral every 8 hours  senna 2 Tablet(s) Oral at bedtime  Vonoprazan (Voquezna) 10mg 1 Tablet(s),Vonoprazan (Voquezna) 10mg 1 Tablet(s) 1 Tablet(s) Oral <User Schedule>    MEDICATIONS  (PRN):  benzocaine/menthol Lozenge 1 Lozenge Oral every 3 hours PRN Mouth Sores  HYDROmorphone   Tablet 4 milliGRAM(s) Oral every 8 hours PRN Severe Pain (7 - 10)  melatonin 6 milliGRAM(s) Oral at bedtime PRN Insomnia    ___________________________________________________________________________    PHYSICAL EXAM:    Gen - NAD, Comfortable  HEENT - NCAT, EOMI, PERRLA  Pulm - CTAB, No wheeze, No rhonchi, No crackles  Cardiovascular - RRR, S1S2, No murmurs  Abdomen - Soft, mildly tender in RLQ, (+) BS throughout  Extremities - No C/C/E, No calf tenderness B/L  Neuro-     Cognitive - AAOx3, follows command     Motor -                     LEFT    UE - ShAB 4/5, EF 5/5, EE 5/5, WE 5/5,  5/5                    RIGHT UE - ShAB 4/5, EF 5/5, EE 5/5, WE 5/5,  5/5                    LEFT    LE - HF 4/5, KE 5/5, DF 5/5, PF 5/5                    RIGHT LE - HF 4/5, KE 5/5, DF 5/5, PF 5/5        Sensory - Intact to LT in UEs and LEs B/L   Psychiatric - Mood stable, Affect WNL  Skin: Midline dressing with drain exiting spine in place with minimal collection in bag. Clean dry and skin intact without erythema or breakdown. 2, 1-2cm  small incision sites, 1 to L and 1 to R of Thoracic spine at site of prior drains. Healing well with skin approximated and no drainage or signs of infection.    ___________________________________________________________________________

## 2024-11-04 NOTE — PROGRESS NOTE ADULT - SUBJECTIVE AND OBJECTIVE BOX
CC: Patient is a 74y old  Female who presents with a chief complaint of MSSA Bacteremia 2/2 paraspinal abscess s/p thoracolumbar (T9-L3) posterior fusion of T Spine revision (03 Nov 2024 09:51)      Interval History:      No overnight events.  No new complaints.    ALLERGIES:  Dilantin (Urticaria)  penicillins (Urticaria)  erythromycin (Stomach Upset; Vomiting; Nausea)    MEDICATIONS  (STANDING):  acetaminophen     Tablet .. 975 milliGRAM(s) Oral every 8 hours  apixaban 5 milliGRAM(s) Oral every 12 hours  atorvastatin 40 milliGRAM(s) Oral at bedtime  ceFAZolin   IVPB 2000 milliGRAM(s) IV Intermittent every 8 hours  chlorhexidine 2% Cloths 1 Application(s) Topical daily  cyclobenzaprine 10 milliGRAM(s) Oral three times a day  DULoxetine 20 milliGRAM(s) Oral daily  ergocalciferol 26141 Unit(s) Oral <User Schedule>  erythromycin    ethylsuccinate Suspension 40 mG/mL 128 milliGRAM(s) Oral every 12 hours  metoprolol tartrate 50 milliGRAM(s) Oral two times a day  montelukast 10 milliGRAM(s) Oral daily  multivitamin 1 Tablet(s) Oral daily  polyethylene glycol 3350 17 Gram(s) Oral two times a day  pramipexole 0.75 milliGRAM(s) Oral <User Schedule>  pregabalin 100 milliGRAM(s) Oral every 8 hours  senna 2 Tablet(s) Oral at bedtime    MEDICATIONS  (PRN):  benzocaine/menthol Lozenge 1 Lozenge Oral every 3 hours PRN Mouth Sores  HYDROmorphone   Tablet 4 milliGRAM(s) Oral every 8 hours PRN Severe Pain (7 - 10)  melatonin 6 milliGRAM(s) Oral at bedtime PRN Insomnia    Vital Signs Last 24 Hrs  T(F): 98.3 (04 Nov 2024 08:16), Max: 99.1 (03 Nov 2024 19:40)  HR: 85 (04 Nov 2024 08:16) (85 - 95)  BP: 126/77 (04 Nov 2024 08:16) (126/77 - 157/75)  RR: 16 (04 Nov 2024 08:16) (16 - 16)  SpO2: 95% (04 Nov 2024 08:16) (94% - 95%)  I&O's Summary    03 Nov 2024 07:01  -  04 Nov 2024 07:00  --------------------------------------------------------  IN: 50 mL / OUT: 15 mL / NET: 35 mL      BMI (kg/m2): 35.1 (10-30-24 @ 16:40)    PHYSICAL EXAM:  GENERAL: NAD  CHEST/LUNG: No rales, rhonchi, wheezing; normal respiratory effort  HEART: RRR; No murmurs, rubs, or gallops  ABDOMEN: Soft, Nontender, Nondistended; Bowel sounds present  MSK/EXT:  No peripheral edema, 2+ Peripheral Pulses, No clubbing or digital cyanosis  PSYCH: Appropriate affect, Alert & Oriented    LABS:                        8.6    5.44  )-----------( 415      ( 04 Nov 2024 06:25 )             28.9       11-04    137  |  104  |  13  ----------------------------<  107  3.8   |  27  |  0.75    Ca    8.9      04 Nov 2024 06:25    TPro  6.7  /  Alb  2.7  /  TBili  0.4  /  DBili  x   /  AST  25  /  ALT  17  /  AlkPhos  194  11-04               Urinalysis Basic - ( 04 Nov 2024 06:25 )    Color: x / Appearance: x / SG: x / pH: x  Gluc: 107 mg/dL / Ketone: x  / Bili: x / Urobili: x   Blood: x / Protein: x / Nitrite: x   Leuk Esterase: x / RBC: x / WBC x   Sq Epi: x / Non Sq Epi: x / Bacteria: x        COVID-19 PCR: NotDetec (10-30-24 @ 21:45)  COVID-19 PCR: NotDetec (10-21-24 @ 09:20)  COVID-19 PCR: NotDetec (10-17-24 @ 16:34)      Care Discussed with Consultants/Other Providers: Yes

## 2024-11-04 NOTE — PROGRESS NOTE ADULT - ASSESSMENT
Assessment/Plan:  Mrs. Haley French is a 74-year-old female patient with past medical history of OA, HTN, gastroparesis, peripheral neuropathy, multiple prior thoracolumbar spine surgeries for congenital scoliosis [place] >[place] w/ removal of hardware (1991) who is admitted for Acute Inpatient Rehabilitation with a multidisciplinary rehab program at [facility] with functional impairments in ADLs and mobility secondary to mechanical fall reporting a T12 three column spine fracture-malalignment treated surgically with a T9-L3 open fusion with PSO/lag screw partial corpectomy/interbody by Dr. Stroud, Neurosurgeon with complex Plastic Surgery closure by Dr. Elias on 9/29/24 subsequently complicated with MSSA bacteremia secondary to a paraspinal abscess s/p paraspinal collection aspiration on 10/25/24.     #Traumatic T9-L3 vertebral fracture s/p corpectomy and fusion surgery c/b MSSA Bacteremia 2/2 paraspinal abscess  - Paraspinal abscess with Sepsis due to methicillin susceptible Staphylococcus aureus.       * 10/20 Bcx MSSA, 10/21 no growth at 24hrs      * CXR with old rib fracture and small RUL infiltrate      * 10/20 +UA for Ecoli, follow urine culture      * Lactic acidosis resolved, lactate 1.6 10/23      * 10/23 Na 134 K 3.3 Phos 1.5      * MRI: rim-enhancing heterogeneous fluid collection suspicious for an abscess; culture growing GPC in pairs and rare staph aureus       * TTE: no evidence of vegetations      * No Acute surgical intervention per neurosurgery       * S/P paraspinal collection aspiration with paraspinal abscess drain placed with Dr. Guerra 10/25      * Repeat daily Bcx- negative      * Monitor WBC and fever curve      * S/P PICC insertion 10/28      * TLSO when OOB      * Cefazolin 2000mg IV q8 until 12/12/24 via PICC- until 12/12/24-> Start PO Duricef 500mg BID on 12/13/24 - long term/indefinitely   - Weekly CBC Diff BMP ESR CRP; emailed to [email or web address]  - (10/31) CRP 56, ESR 95   - Activity Limitations: Decreased social, vocational and leisure activities, decreased self care and ADLs, decreased mobility, decreased ability to manage household and finances.   - Comprehensive Multidisciplinary Rehab Program:      * 3 hours a day, 5 days a week.      * PT 2hr/day: Focused on improving strength, endurance, coordination, balance, functional mobility, and transfers      * OT 1hr/day: Focused on improving strength, fine motor skills, coordination, posture and ADLs.      -----------------------------------------------------------------------------------  Concurrent Medical Problems    #Paroxysmal atrial fibrillation.   - 10/25 ECG and exam notable for irregular rate and rhythm  - Metoprolol to 25 BID for rate control  - S/p heparin gtt 3500mg IV q6 for anticoagulation  - EKG - tachy to 110s this am in the setting of pain    #Acute on chronic low back pain.   - S/P T9-L3 open fusion with PSO/lag screw partial corpectomy/interbody with complex plastic closure (w/ Dr. Stroud and Dr. Tinajero on 9/29  - TLSO when OOB  - Pain management: Tylenol 975mg ATC TID,  Duloxetine 20mg daily, Lyrica 100mg TID , Flexeril 10mg TID standing,   - Start hydromorphone 4 mg PO q8h PRN  - Eliquis 5mg BID    #HTN  #Orthostatic Hypotension  #HLD  - 10/1 TTE: EF 61%  - S/P 1L IVF bolus (10/13) for symptomatic hypotension with good effect  - Metoprolol to 25 BID  - Atorvastatin 40mg HS  - Losartan 100 mg daily  - Chlorthalidone 25 mg daily on hold  (restart if SBP is consistently above 140 per cardio)  - Midodrine 7.5 mg on hold    #Postoperative anemia  - S/p PRBCs intraoperatively for T spine fusion   - Hgb stable with no evidence of active bleed at this time   - 10/30- Hgb 8.6/ Hct 28.9, (10/31) 9.4/30.3    - Monitor H/H; transfuse for Hgb <7    #SILVANA (obstructive sleep apnea).   - Transient desaturations on RA    - Nocturnal 2L O2 and PRN during day during naps, monitor sats   - F/u with PCP outpatient as may benefit from sleep study and CPAP.    #Restless legs syndrome (RLS).   - Pramipexole 0.75mg daily     #Seasonal allergies.   - Montelukast 10mg daily    #Liver cyst.  -10/10 CT Abd/pelvis- There is a large cyst, stable since prior exam. There are small hypodense foci on segment II and segment I too small to characterize, unchanged since prior  - F/u outpatient     #Elevated serum alkaline phosphatase level.   - Hi serum alk phos levels 247 --> 228 --> 172 --> 233 --> 239--> 240--> 247--> 234---> 276 (10/31) 227  - Monitor serum alk phos levels.    #Gastritis.   - Vonoprazan 10mg daily  - Erythromycin 128mg oral fluid BID    #Mood/Cognition:  Neuropsychology consult PRN    #Sleep:   Maintain quiet hours and low stim environment.  Melatonin 6mg HS PRN to maximize participation in therapy during the day.     #GI/Bowel:  Senna QHS  Miralax PRN Daily    #/Bladder:   - PVR q 8 hours (SC if > 400)    #Skin/Pressure Injury:   - Skin assessment on admission: Midline sine dressing with drain in place, with minimal drainage. No pressure injuries.    #Diet/Supplement  Current Diet: Regular- Dash diet   -Ergocalciferol 03178U weekly   -Multivitamin daily     #Precautions / PROPHYLAXIS:   - Falls,  Spinal  - ortho: Weight bearing status: WBAT, TLSO brace OOB  - Lungs: Incentive Spirometer   - DVT ppx: SCDs, TEDs, Eliquis 5mg BID  - Pressure injury/Skin:  OOB to Chair, PT/OT      ---------------  Code Status: FULL  Emergency Contact:    Outpatient Follow-up (Specialty/Name of physician):    Fran Carr  Internal Medicine  [address], [address]  [place] [address]  Phone: [phone]  Fax: [phone]  Follow Up Time:     Kaushal Waterman  Infectious Disease  [address] [place] [address]  Phone: [phone]  Follow Up Time:  [facility]  Neurosurgery  Referral Assistance Program    Guille Lance  [facility]  ORTHOSURG [address]  Scheduled Appointment: 11/22/2024    Catalino Cotto  [facility]  WEIGHTMGMT  [facility]  Scheduled Appointment: 12/05/2024    Haydee Bernstein  [facility]  GASTRO [address]  Scheduled Appointment: 01/06/2025      [facility] - Infectious Disease  Infectious Disease  [address], [place] [address]  Phone: [phone]    --------------

## 2024-11-05 ENCOUNTER — NON-APPOINTMENT (OUTPATIENT)
Age: 74
End: 2024-11-05

## 2024-11-05 PROCEDURE — 99232 SBSQ HOSP IP/OBS MODERATE 35: CPT

## 2024-11-05 RX ORDER — DIPHENHYDRAMINE HCL 25 MG
25 CAPSULE ORAL
Refills: 0 | Status: DISCONTINUED | OUTPATIENT
Start: 2024-11-05 | End: 2024-11-20

## 2024-11-05 RX ADMIN — PREGABALIN 100 MILLIGRAM(S): 75 CAPSULE ORAL at 14:20

## 2024-11-05 RX ADMIN — PREGABALIN 100 MILLIGRAM(S): 75 CAPSULE ORAL at 05:09

## 2024-11-05 RX ADMIN — MONTELUKAST SODIUM 10 MILLIGRAM(S): 10 TABLET ORAL at 11:17

## 2024-11-05 RX ADMIN — METOPROLOL TARTRATE 50 MILLIGRAM(S): 100 TABLET, FILM COATED ORAL at 18:20

## 2024-11-05 RX ADMIN — Medication 128 MILLIGRAM(S): at 05:11

## 2024-11-05 RX ADMIN — ACETAMINOPHEN 500MG 975 MILLIGRAM(S): 500 TABLET, COATED ORAL at 15:20

## 2024-11-05 RX ADMIN — Medication 10 MILLIGRAM(S): at 14:20

## 2024-11-05 RX ADMIN — HYDROMORPHONE HYDROCHLORIDE 4 MILLIGRAM(S): 2 TABLET ORAL at 01:46

## 2024-11-05 RX ADMIN — ACETAMINOPHEN 500MG 975 MILLIGRAM(S): 500 TABLET, COATED ORAL at 21:46

## 2024-11-05 RX ADMIN — PRAMIPEXOLE 0.75 MILLIGRAM(S): 1.5 TABLET, EXTENDED RELEASE ORAL at 14:48

## 2024-11-05 RX ADMIN — ACETAMINOPHEN 500MG 975 MILLIGRAM(S): 500 TABLET, COATED ORAL at 21:01

## 2024-11-05 RX ADMIN — Medication 10 MILLIGRAM(S): at 05:08

## 2024-11-05 RX ADMIN — Medication 20 MILLIGRAM(S): at 11:17

## 2024-11-05 RX ADMIN — HYDROMORPHONE HYDROCHLORIDE 4 MILLIGRAM(S): 2 TABLET ORAL at 18:19

## 2024-11-05 RX ADMIN — ACETAMINOPHEN 500MG 975 MILLIGRAM(S): 500 TABLET, COATED ORAL at 14:20

## 2024-11-05 RX ADMIN — ACETAMINOPHEN 500MG 975 MILLIGRAM(S): 500 TABLET, COATED ORAL at 05:53

## 2024-11-05 RX ADMIN — Medication 10 MILLIGRAM(S): at 21:01

## 2024-11-05 RX ADMIN — HYDROMORPHONE HYDROCHLORIDE 4 MILLIGRAM(S): 2 TABLET ORAL at 02:31

## 2024-11-05 RX ADMIN — CHLORHEXIDINE GLUCONATE 1 APPLICATION(S): 1.2 RINSE ORAL at 11:21

## 2024-11-05 RX ADMIN — ACETAMINOPHEN 500MG 975 MILLIGRAM(S): 500 TABLET, COATED ORAL at 05:08

## 2024-11-05 RX ADMIN — Medication 40 MILLIGRAM(S): at 21:01

## 2024-11-05 RX ADMIN — APIXABAN 5 MILLIGRAM(S): 2.5 TABLET, FILM COATED ORAL at 05:08

## 2024-11-05 RX ADMIN — Medication 100 MILLIGRAM(S): at 14:24

## 2024-11-05 RX ADMIN — METOPROLOL TARTRATE 50 MILLIGRAM(S): 100 TABLET, FILM COATED ORAL at 05:10

## 2024-11-05 RX ADMIN — Medication 2 TABLET(S): at 21:01

## 2024-11-05 RX ADMIN — Medication 1 TABLET(S): at 11:17

## 2024-11-05 RX ADMIN — Medication 100 MILLIGRAM(S): at 21:03

## 2024-11-05 RX ADMIN — PREGABALIN 100 MILLIGRAM(S): 75 CAPSULE ORAL at 21:02

## 2024-11-05 RX ADMIN — Medication 100 MILLIGRAM(S): at 05:07

## 2024-11-05 RX ADMIN — APIXABAN 5 MILLIGRAM(S): 2.5 TABLET, FILM COATED ORAL at 18:20

## 2024-11-05 NOTE — PROGRESS NOTE ADULT - SUBJECTIVE AND OBJECTIVE BOX
CC: Patient is a 74y old  Female who presents with a chief complaint of Traumatic T9-L3 vertebral fracture s/p corpectomy and fusion surgery c/b MSSA Bacteremia 2/2 paraspinal abscess (05 Nov 2024 13:09)      Interval History:    No overnight events.  No new complaints.    ALLERGIES:  Dilantin (Urticaria)  penicillins (Urticaria)  erythromycin (Stomach Upset; Vomiting; Nausea)    MEDICATIONS  (STANDING):  acetaminophen     Tablet .. 975 milliGRAM(s) Oral every 8 hours  apixaban 5 milliGRAM(s) Oral every 12 hours  atorvastatin 40 milliGRAM(s) Oral at bedtime  ceFAZolin   IVPB 2000 milliGRAM(s) IV Intermittent every 8 hours  chlorhexidine 2% Cloths 1 Application(s) Topical daily  cyclobenzaprine 10 milliGRAM(s) Oral three times a day  DULoxetine 20 milliGRAM(s) Oral daily  ergocalciferol 38191 Unit(s) Oral <User Schedule>  erythromycin    ethylsuccinate Suspension 40 mG/mL 128 milliGRAM(s) Oral every 12 hours  metoprolol tartrate 50 milliGRAM(s) Oral two times a day  montelukast 10 milliGRAM(s) Oral daily  multivitamin 1 Tablet(s) Oral daily  polyethylene glycol 3350 17 Gram(s) Oral two times a day  pramipexole 0.75 milliGRAM(s) Oral <User Schedule>  pregabalin 100 milliGRAM(s) Oral every 8 hours  senna 2 Tablet(s) Oral at bedtime  Vonoprazan (Voquezna) 10mg 1 Tablet(s),Vonoprazan (Voquezna) 10mg 1 Tablet(s) 1 Tablet(s) Oral <User Schedule>    MEDICATIONS  (PRN):  benzocaine/menthol Lozenge 1 Lozenge Oral every 3 hours PRN Mouth Sores  HYDROmorphone   Tablet 4 milliGRAM(s) Oral every 8 hours PRN Severe Pain (7 - 10)  melatonin 6 milliGRAM(s) Oral at bedtime PRN Insomnia    Vital Signs Last 24 Hrs  T(F): 98.3 (05 Nov 2024 07:26), Max: 98.3 (05 Nov 2024 07:26)  HR: 92 (05 Nov 2024 07:26) (81 - 97)  BP: 161/76 (05 Nov 2024 07:26) (110/70 - 161/76)  RR: 16 (05 Nov 2024 07:26) (16 - 16)  SpO2: 95% (05 Nov 2024 07:26) (93% - 95%)  I&O's Summary        PHYSICAL EXAM:  GENERAL: NAD  CHEST/LUNG: No rales, rhonchi, wheezing; normal respiratory effort  HEART: RRR; No murmurs, rubs, or gallops  ABDOMEN: Soft, Nontender, Nondistended; Bowel sounds present  MSK/EXT:  No peripheral edema, 2+ Peripheral Pulses, No clubbing or digital cyanosis  PSYCH: Appropriate affect, Alert & Oriented    LABS:                        8.6    5.44  )-----------( 415      ( 04 Nov 2024 06:25 )             28.9       11-04    137  |  104  |  13  ----------------------------<  107  3.8   |  27  |  0.75    Ca    8.9      04 Nov 2024 06:25    TPro  6.7  /  Alb  2.7  /  TBili  0.4  /  DBili  x   /  AST  25  /  ALT  17  /  AlkPhos  194  11-04             Urinalysis Basic - ( 04 Nov 2024 06:25 )    Color: x / Appearance: x / SG: x / pH: x  Gluc: 107 mg/dL / Ketone: x  / Bili: x / Urobili: x   Blood: x / Protein: x / Nitrite: x   Leuk Esterase: x / RBC: x / WBC x   Sq Epi: x / Non Sq Epi: x / Bacteria: x        COVID-19 PCR: NotDetec (10-30-24 @ 21:45)  COVID-19 PCR: NotDetec (10-21-24 @ 09:20)  COVID-19 PCR: NotDetec (10-17-24 @ 16:34)      Care Discussed with Consultants/Other Providers: Yes

## 2024-11-05 NOTE — PROGRESS NOTE ADULT - ASSESSMENT
Assessment/Plan:  Mrs. Haley French is a 74-year-old female patient with past medical history of OA, HTN, gastroparesis, peripheral neuropathy, multiple prior thoracolumbar spine surgeries for congenital scoliosis [place] >[place] of [place] w/ removal of hardware (1991) who is admitted for Acute Inpatient Rehabilitation with a multidisciplinary rehab program at [facility] with functional impairments in ADLs and mobility secondary to mechanical fall reporting a T12 three column spine fracture-malalignment treated surgically with a T9-L3 open fusion with PSO/lag screw partial corpectomy/interbody by Dr. Stroud, Neurosurgeon with complex Plastic Surgery closure by Dr. Elias on 9/29/24 subsequently complicated with MSSA bacteremia secondary to a paraspinal abscess s/p paraspinal collection aspiration on 10/25/24.     #Traumatic T9-L3 vertebral fracture s/p corpectomy and fusion surgery c/b MSSA Bacteremia 2/2 paraspinal abscess  - Paraspinal abscess with Sepsis due to methicillin susceptible Staphylococcus aureus.       * 10/20 Bcx MSSA, 10/21 no growth at 24hrs      * CXR with old rib fracture and small RUL infiltrate      * 10/20 +UA for Ecoli, follow urine culture      * Lactic acidosis resolved, lactate 1.6 10/23      * 10/23 Na 134 K 3.3 Phos 1.5      * MRI: rim-enhancing heterogeneous fluid collection suspicious for an abscess; culture growing GPC in pairs and rare staph aureus       * TTE: no evidence of vegetations      * No Acute surgical intervention per neurosurgery       * S/P paraspinal collection aspiration with paraspinal abscess drain placed with Dr. Guerra 10/25      * Repeat daily Bcx- negative      * Monitor WBC and fever curve      * S/P PICC insertion 10/28      * TLSO when OOB      * Cefazolin 2000mg IV q8 until 12/12/24 via PICC- until 12/12/24-> Start PO Duricef 500mg BID on 12/13/24 - long term/indefinitely   - Weekly CBC Diff BMP ESR CRP; emailed to [email or web address]  - (10/31) CRP 56, ESR 95   - Activity Limitations: Decreased social, vocational and leisure activities, decreased self care and ADLs, decreased mobility, decreased ability to manage household and finances.   - Comprehensive Multidisciplinary Rehab Program:      * 3 hours a day, 5 days a week.      * PT 2hr/day: Focused on improving strength, endurance, coordination, balance, functional mobility, and transfers      * OT 1hr/day: Focused on improving strength, fine motor skills, coordination, posture and ADLs.      -----------------------------------------------------------------------------------  Concurrent Medical Problems    #Paroxysmal atrial fibrillation.   - 10/25 ECG and exam notable for irregular rate and rhythm  - Metoprolol to 25 BID for rate control  - S/p heparin gtt 3500mg IV q6 for anticoagulation  - EKG - tachy to 110s this am in the setting of pain    #Acute on chronic low back pain.   - S/P T9-L3 open fusion with PSO/lag screw partial corpectomy/interbody with complex plastic closure (w/ Dr. Stroud and Dr. Tinajero on 9/29  - TLSO when OOB  - Pain management: Tylenol 975mg ATC TID,  Duloxetine 20mg daily, Lyrica 100mg TID , Flexeril 10mg TID standing,   - Start hydromorphone 4 mg PO q8h PRN  - Eliquis 5mg BID    #HTN  #Orthostatic Hypotension  #HLD  - 10/1 TTE: EF 61%  - S/P 1L IVF bolus (10/13) for symptomatic hypotension with good effect  - Metoprolol to 25 BID  - Atorvastatin 40mg HS  - Losartan 100 mg daily  - Chlorthalidone 25 mg daily on hold  (restart if SBP is consistently above 140 per cardio)  - Midodrine 7.5 mg on hold    #Postoperative anemia  - S/p PRBCs intraoperatively for T spine fusion   - Hgb stable with no evidence of active bleed at this time   - 10/30- Hgb 8.6/ Hct 28.9, (10/31) 9.4/30.3    - Monitor H/H; transfuse for Hgb <7    #SILVANA (obstructive sleep apnea).   - Transient desaturations on RA    - Nocturnal 2L O2 and PRN during day during naps, monitor sats   - F/u with PCP outpatient as may benefit from sleep study and CPAP.    #Restless legs syndrome (RLS).   - Pramipexole 0.75mg daily     #Seasonal allergies.   - Montelukast 10mg daily    #Liver cyst.  -10/10 CT Abd/pelvis- There is a large cyst, stable since prior exam. There are small hypodense foci on segment II and segment I too small to characterize, unchanged since prior  - F/u outpatient     #Elevated serum alkaline phosphatase level.   - Hi serum alk phos levels 247 --> 228 --> 172 --> 233 --> 239--> 240--> 247--> 234---> 276 (10/31) 227  - Monitor serum alk phos levels.    #Gastritis.   - Vonoprazan 10mg daily  - Erythromycin 128mg oral fluid BID    #Mood/Cognition:  Neuropsychology consult PRN    #Sleep:   Maintain quiet hours and low stim environment.  Melatonin 6mg HS PRN to maximize participation in therapy during the day.     #GI/Bowel:  Senna QHS  Miralax PRN Daily    #/Bladder:   - PVR q 8 hours (SC if > 400)    #Skin/Pressure Injury:   - Skin assessment on admission: Midline sine dressing with drain in place, with minimal drainage. No pressure injuries.    #Diet/Supplement  Current Diet: Regular- Dash diet   -Ergocalciferol 54944H weekly   -Multivitamin daily     #Precautions / PROPHYLAXIS:   - Falls,  Spinal  - ortho: Weight bearing status: WBAT, TLSO brace OOB  - Lungs: Incentive Spirometer   - DVT ppx: SCDs, TEDs, Eliquis 5mg BID  - Pressure injury/Skin:  OOB to Chair, PT/OT      ---------------  Code Status: FULL  Emergency Contact:    Outpatient Follow-up (Specialty/Name of physician):    Fran Carr  Internal Medicine  [address], [address]  [place] [address]  Phone: [phone]  Fax: [phone]  Follow Up Time:     Kaushal Waterman  Infectious Disease  [address] [place] [address]  Phone: [phone]  Follow Up Time:  [facility]  Neurosurgery  Referral Assistance Program    Guille Lance  [facility]  ORTHOSURG [address]  Scheduled Appointment: 11/22/2024    Catalino Cotto  [facility]  WEIGHTMGMT  [facility]  Scheduled Appointment: 12/05/2024    Haydee Bernstein  [facility]  GASTRO [address]  Scheduled Appointment: 01/06/2025      [facility] - Infectious Disease  Infectious Disease  [address], [place] [address]  Phone: [phone]    --------------

## 2024-11-05 NOTE — PROGRESS NOTE ADULT - ASSESSMENT
75 y/o F with PMHx of OA, HTN, gastroparesis, peripheral neuropathy, multiple prior thoracolumbar spine surgeries for congenital scoliosis done >[place] years ago out of [place] w/ removal of hardware (1991) and recent fall with T11-T12 fracture, s/p thoracolumbar posterior fusion for t spine fx with plastics closure on  9/29 with Dr. Stroud and Dr. Funk, c/b post op csf leak, new onset paroxysmal Afib with RVR, increased post op pain and drainage from incision site, pleural effusions, symptomatic orthostatic hypotension, urinary retention, post op anemia, and CP with spontaneous resolution. Patient was sent to [facility] rehab on 10/17, c/b AMS, sepsis with high fever, rigors, tachycardia on 10/20 found to have MSSA bacteremia with right upper lobe infiltrate, and UTI. CT spine no report of collection. Urine +ve E coli, transferred back to [facility] on 10/23 for MICHAEL, advanced spine imaging, and further management, found to have paraspinal abscess on MR imaging, S/p paraspinal collection aspiration on 10/25, culture growing gram positive cocci in pairs, with rare staph aureus. Neurosurgery advised no acute surgical revision necessary, repeat BCx negative. To continue long-term IV cefazolin via PICC until 12/12/24 (6 wks) with eventual transition to PO Duricef long term/indefinitely as per ID. MICHAEL was negative for valvular vegetations but did reveal incidental PFO. Hospital course significant for recurrent fevers, A fib, started AC. Now at [facility] for rehab.     MSSA Bacteremia   - s/p paraspinal collection aspiration 10/25 with Dr. Guerra  - continue Cefazolin 2g q8h via PICC until 12/12, then start Duricef 500mg bid indefinitely     Back Pain  - S/P T9-L3 open fusion with PSO/lag screw partial corpectomy/interbody with complex plastic closure (w/ Dr. tSroud and Dr. Tinajero on 9/29  - TLSO when OOB  - PT/OT per PMR  - Pain management per PMR    Hypertension  Orthostasis   - monitor vitals  - chlorthalidone on hold  - discontinued losartan 100mg (11/2-)  - on metoprolol 50mg bid for rate control with holding parameters. can increase if needed for Bp optimization and rate control    A-fib  - continue Eliquis AC   - lopressor 50mg bid    Anemia   - monitor     DVT Prophylaxis:  - Eliquis    11/4 labs reviewed

## 2024-11-05 NOTE — PROGRESS NOTE ADULT - SUBJECTIVE AND OBJECTIVE BOX
HPI:  Mrs. Haley French is a 74-year-old female patient with past medical history of OA, HTN, gastroparesis, peripheral neuropathy, multiple prior thoracolumbar spine surgeries for congenital scoliosis done >10 years ago out of [place] w/ removal of hardware (1991) who presented to [facility] on 9/28/24 with back pain and left hip pain s/p mechanical fall. CT imaging was performed and reported a T12 three column spine fracture-malalignment with fracture extending to the adjacent right 12th posterior rib and left T12-L1 facet joint and acute, displaced fracture of the left eighth lateral rib. Chronic appearing bilateral rib deformities. Surgical management was recommended and she is S/P T9-L3 open fusion with PSO/lag screw partial corpectomy/interbody by Dr. Stroud, Neurosurgeon with complex Plastic Surgery closure by Dr. Elias on 9/29/24. Post op course c/b CSK leak s/p repair, new onset paroxsymal Afib with RVR,  increased pain and drainage from surgical incision, development of pleural effusions, symptomatic orthostatic hypotension, urinary retention, post op anemia, and chest pain with spontaneous resolution. Cardiac work up negative. Patient was evaluated by PM&R and therapy for functional deficits, gait/ADL impairments and acute rehabilitation was recommended. Patient was cleared for discharge to [facility] IRF on 10/16/24.    Her admission was remarkable for a developing a fever of 103 with tachycardia and subjective fever/chills. Sepsis w/up was initiated and found to have small right upper lobe infiltrate, UTI, and MSSA bacteremia. CTH negative. Hospitalist and ID were consulted and per G+ bacteremia guidelines was recommended completing infectious workup, which includes MICHAEL which is not available at [facility]. Patient also required PICC line placement for long term ABx and a transfer request initiated from [place] to [facility] for MICHAEL and completion of bacteremia workup/treatment. All other medical co-morbidities were stable. Patient was deemed medically stable for discharge to [facility] medicine on 10/21/24 and was subsequently transferred on 10/23/24 for MICHAEL, advanced spine imaging, and further management. She was found to have a paraspinal abscess on MR imaging and is s/p paraspinal collection aspiration on 10/25/24. Culture grew gram positive cocci in pairs, with rare staph aureus. Neurosurgery advised and no acute surgical revision was deemed necessary. Repeat BCx was negative. Recommendation to continue long-term IV cefazolin via PICC until 12/12/24 (6 wks) with eventual transition to PO Duricef long term/indefinitely per ID. MICHAEL was negative for valvular vegetations but did reveal incidental PFO. Hospital course was significant for recurrent fevers, A fib (resolved) with source control and electrolyte correction, initiated on DOAc given elevated [id number]. Patient was evaluated by PM&R and therapy for functional deficits, gait/ADL impairments and acute rehabilitation was recommended. Patient was cleared for discharge to [facility] IRF on 10/30/24.   (30 Oct 2024 13:05)    TDD: 11/20/24 to Home  ___________________________________________________________________________    SUBJECTIVE/ROS  Patient was seen and evaluated at bedside today.  Reported no overnight events and is in no acute distress.  ESR and CRP in decreasing trend. She has remained afebrile.  Case was discussed at Interdisciplinary Team meeting today.  A tentative discharge date is outlined above.  Patient is eager to continue participation on the recommended rehabilitation program.  Denies any CP, SOB, POLLARD, palpitations, fever, chills, body aches, cough, congestion, or any other symptoms at this time.   ___________________________________________________________________________    Vital Signs Last 24 Hrs  T(C): 36.8 (05 Nov 2024 07:26), Max: 36.8 (05 Nov 2024 07:26)  T(F): 98.3 (05 Nov 2024 07:26), Max: 98.3 (05 Nov 2024 07:26)  HR: 92 (05 Nov 2024 07:26) (81 - 97)  BP: 161/76 (05 Nov 2024 07:26) (110/70 - 161/76)  RR: 16 (05 Nov 2024 07:26) (16 - 16)  SpO2: 95% (05 Nov 2024 07:26) (93% - 95%)    ___________________________________________________________________________    LABS                          8.6    5.44  )-----------( 415      ( 04 Nov 2024 06:25 )             28.9       11-04    137  |  104  |  13  ----------------------------<  107[H]  3.8   |  27  |  0.75    Ca    8.9      04 Nov 2024 06:25    TPro  6.7  /  Alb  2.7[L]  /  TBili  0.4  /  DBili  x   /  AST  25  /  ALT  17  /  AlkPhos  194[H]  11-04      ESR Historical Values  Sedimentation Rate, Erythrocyte (11.04.24 @ 06:25)   Sedimentation Rate, Erythrocyte: 90 mm/hr  Sedimentation Rate, Erythrocyte (10.31.24 @ 06:00)   Sedimentation Rate, Erythrocyte: 95 mm/hr    CRP Historical Values  C-Reactive Protein (11.04.24 @ 06:25)   C-Reactive Protein: 50 mg/L  C-Reactive Protein (10.31.24 @ 06:00)   C-Reactive Protein: 56 mg/L  C-Reactive Protein (10.21.24 @ 06:20)   C-Reactive Protein: 183 mg/L      ___________________________________________________________________________    MEDICATIONS  (STANDING):  acetaminophen     Tablet .. 975 milliGRAM(s) Oral every 8 hours  apixaban 5 milliGRAM(s) Oral every 12 hours  atorvastatin 40 milliGRAM(s) Oral at bedtime  ceFAZolin   IVPB 2000 milliGRAM(s) IV Intermittent every 8 hours  chlorhexidine 2% Cloths 1 Application(s) Topical daily  cyclobenzaprine 10 milliGRAM(s) Oral three times a day  DULoxetine 20 milliGRAM(s) Oral daily  ergocalciferol 43338 Unit(s) Oral <User Schedule>  erythromycin    ethylsuccinate Suspension 40 mG/mL 128 milliGRAM(s) Oral every 12 hours  metoprolol tartrate 50 milliGRAM(s) Oral two times a day  montelukast 10 milliGRAM(s) Oral daily  multivitamin 1 Tablet(s) Oral daily  polyethylene glycol 3350 17 Gram(s) Oral two times a day  pramipexole 0.75 milliGRAM(s) Oral <User Schedule>  pregabalin 100 milliGRAM(s) Oral every 8 hours  senna 2 Tablet(s) Oral at bedtime  Vonoprazan (Voquezna) 10mg 1 Tablet(s),Vonoprazan (Voquezna) 10mg 1 Tablet(s) 1 Tablet(s) Oral <User Schedule>    MEDICATIONS  (PRN):  benzocaine/menthol Lozenge 1 Lozenge Oral every 3 hours PRN Mouth Sores  HYDROmorphone   Tablet 4 milliGRAM(s) Oral every 8 hours PRN Severe Pain (7 - 10)  melatonin 6 milliGRAM(s) Oral at bedtime PRN Insomnia    ___________________________________________________________________________    PHYSICAL EXAM:    Gen - NAD, Comfortable  HEENT - NCAT, EOMI, PERRLA  Pulm - CTAB, No wheeze, No rhonchi, No crackles  Cardiovascular - RRR, S1S2, No murmurs  Abdomen - Soft, mildly tender in RLQ, (+) BS throughout  Extremities - No C/C/E, No calf tenderness B/L  Neuro-     Cognitive - AAOx3, follows command     Motor -                     LEFT    UE - ShAB 4/5, EF 5/5, EE 5/5, WE 5/5,  5/5                    RIGHT UE - ShAB 4/5, EF 5/5, EE 5/5, WE 5/5,  5/5                    LEFT    LE - HF 4/5, KE 5/5, DF 5/5, PF 5/5                    RIGHT LE - HF 4/5, KE 5/5, DF 5/5, PF 5/5        Sensory - Intact to LT in UEs and LEs B/L   Psychiatric - Mood stable, Affect WNL  Skin: Midline dressing with drain exiting spine in place with minimal collection in bag. Clean dry and skin intact without erythema or breakdown. 2, 1-2cm  small incision sites, 1 to L and 1 to R of Thoracic spine at site of prior drains. Healing well with skin approximated and no drainage or signs of infection.    ___________________________________________________________________________

## 2024-11-06 PROCEDURE — 99232 SBSQ HOSP IP/OBS MODERATE 35: CPT

## 2024-11-06 PROCEDURE — 71100 X-RAY EXAM RIBS UNI 2 VIEWS: CPT | Mod: 26

## 2024-11-06 RX ORDER — DICLOFENAC SODIUM 20 MG/G
2 SOLUTION TOPICAL
Refills: 0 | Status: DISCONTINUED | OUTPATIENT
Start: 2024-11-06 | End: 2024-11-20

## 2024-11-06 RX ORDER — HYDROMORPHONE HYDROCHLORIDE 2 MG/1
4 TABLET ORAL EVERY 8 HOURS
Refills: 0 | Status: DISCONTINUED | OUTPATIENT
Start: 2024-11-06 | End: 2024-11-11

## 2024-11-06 RX ORDER — DICLOFENAC SODIUM 20 MG/G
2 SOLUTION TOPICAL
Refills: 0 | Status: DISCONTINUED | OUTPATIENT
Start: 2024-11-06 | End: 2024-11-06

## 2024-11-06 RX ORDER — PREGABALIN 75 MG/1
100 CAPSULE ORAL EVERY 8 HOURS
Refills: 0 | Status: DISCONTINUED | OUTPATIENT
Start: 2024-11-06 | End: 2024-11-11

## 2024-11-06 RX ADMIN — ACETAMINOPHEN 500MG 975 MILLIGRAM(S): 500 TABLET, COATED ORAL at 23:00

## 2024-11-06 RX ADMIN — PREGABALIN 100 MILLIGRAM(S): 75 CAPSULE ORAL at 05:25

## 2024-11-06 RX ADMIN — Medication 2 TABLET(S): at 22:12

## 2024-11-06 RX ADMIN — PRAMIPEXOLE 0.75 MILLIGRAM(S): 1.5 TABLET, EXTENDED RELEASE ORAL at 16:57

## 2024-11-06 RX ADMIN — METOPROLOL TARTRATE 50 MILLIGRAM(S): 100 TABLET, FILM COATED ORAL at 05:24

## 2024-11-06 RX ADMIN — Medication 1 TABLET(S): at 12:03

## 2024-11-06 RX ADMIN — Medication 128 MILLIGRAM(S): at 06:32

## 2024-11-06 RX ADMIN — POLYETHYLENE GLYCOL 3350 17 GRAM(S): 17 POWDER, FOR SOLUTION ORAL at 08:25

## 2024-11-06 RX ADMIN — CHLORHEXIDINE GLUCONATE 1 APPLICATION(S): 1.2 RINSE ORAL at 12:14

## 2024-11-06 RX ADMIN — Medication 100 MILLIGRAM(S): at 22:13

## 2024-11-06 RX ADMIN — APIXABAN 5 MILLIGRAM(S): 2.5 TABLET, FILM COATED ORAL at 17:14

## 2024-11-06 RX ADMIN — DICLOFENAC SODIUM 2 GRAM(S): 20 SOLUTION TOPICAL at 22:13

## 2024-11-06 RX ADMIN — Medication 20 MILLIGRAM(S): at 12:03

## 2024-11-06 RX ADMIN — MONTELUKAST SODIUM 10 MILLIGRAM(S): 10 TABLET ORAL at 12:04

## 2024-11-06 RX ADMIN — ACETAMINOPHEN 500MG 975 MILLIGRAM(S): 500 TABLET, COATED ORAL at 06:30

## 2024-11-06 RX ADMIN — Medication 128 MILLIGRAM(S): at 17:14

## 2024-11-06 RX ADMIN — APIXABAN 5 MILLIGRAM(S): 2.5 TABLET, FILM COATED ORAL at 05:24

## 2024-11-06 RX ADMIN — ACETAMINOPHEN 500MG 975 MILLIGRAM(S): 500 TABLET, COATED ORAL at 05:24

## 2024-11-06 RX ADMIN — Medication 10 MILLIGRAM(S): at 22:12

## 2024-11-06 RX ADMIN — Medication 40 MILLIGRAM(S): at 22:12

## 2024-11-06 RX ADMIN — PREGABALIN 100 MILLIGRAM(S): 75 CAPSULE ORAL at 13:27

## 2024-11-06 RX ADMIN — ACETAMINOPHEN 500MG 975 MILLIGRAM(S): 500 TABLET, COATED ORAL at 13:24

## 2024-11-06 RX ADMIN — METOPROLOL TARTRATE 50 MILLIGRAM(S): 100 TABLET, FILM COATED ORAL at 17:16

## 2024-11-06 RX ADMIN — Medication 100 MILLIGRAM(S): at 05:28

## 2024-11-06 RX ADMIN — ACETAMINOPHEN 500MG 975 MILLIGRAM(S): 500 TABLET, COATED ORAL at 22:13

## 2024-11-06 RX ADMIN — ACETAMINOPHEN 500MG 975 MILLIGRAM(S): 500 TABLET, COATED ORAL at 14:00

## 2024-11-06 RX ADMIN — Medication 100 MILLIGRAM(S): at 13:27

## 2024-11-06 RX ADMIN — Medication 10 MILLIGRAM(S): at 05:24

## 2024-11-06 RX ADMIN — PREGABALIN 100 MILLIGRAM(S): 75 CAPSULE ORAL at 22:16

## 2024-11-06 NOTE — PROGRESS NOTE ADULT - ASSESSMENT
73 y/o F with PMHx of OA, HTN, gastroparesis, peripheral neuropathy, multiple prior thoracolumbar spine surgeries for congenital scoliosis [place] >[place] w/ removal of hardware (1991) and recent fall with T11-T12 fracture, s/p thoracolumbar posterior fusion for t spine fx with plastics closure on  9/29 with Dr. Stroud and Dr. Funk, c/b post op csf leak, new onset paroxysmal Afib with RVR, increased post op pain and drainage from incision site, pleural effusions, symptomatic orthostatic hypotension, urinary retention, post op anemia, and CP with spontaneous resolution. Patient was sent to [facility] rehab on 10/17, c/b AMS, sepsis with high fever, rigors, tachycardia on 10/20 found to have MSSA bacteremia with right upper lobe infiltrate, and UTI. CT spine no report of collection. Urine +ve E coli, transferred back to [facility] on 10/23 for MICHAEL, advanced spine imaging, and further management, found to have paraspinal abscess on MR imaging, S/p paraspinal collection aspiration on 10/25, culture growing gram positive cocci in pairs, with rare staph aureus. Neurosurgery advised no acute surgical revision necessary, repeat BCx negative. To continue long-term IV cefazolin via PICC until 12/12/24 (6 wks) with eventual transition to PO Duricef long term/indefinitely as per ID. MICHAEL was negative for valvular vegetations but did reveal incidental PFO. Hospital course significant for recurrent fevers, A fib, started AC. Now at [facility] for rehab.       MSSA Bacteremia   - s/p paraspinal collection aspiration 10/25 with Dr. Guerra  - continue Cefazolin 2g q8h via PICC until 12/12, then start Duricef 500mg bid indefinitely     Back Pain  - S/P T9-L3 open fusion with PSO/lag screw partial corpectomy/interbody with complex plastic closure (w/ Dr. Stroud and Dr. Tinajero on 9/29  - TLSO when OOB  - PT/OT per PMR  - Pain management per PMR    Hypertension  Orthostasis   - monitor vitals  - chlorthalidone on hold  - discontinued losartan 100mg (11/2-)  - on metoprolol 50mg bid for rate control with holding parameters. can increase if needed for Bp optimization and rate control    A-fib  - continue Eliquis AC   - lopressor 50mg bid    Anemia   - monitor     DVT Prophylaxis:  - Eliquis    11/4 labs reviewed

## 2024-11-06 NOTE — PROGRESS NOTE ADULT - SUBJECTIVE AND OBJECTIVE BOX
HPI:  Mrs. Haley French is a 74-year-old female patient with past medical history of OA, HTN, gastroparesis, peripheral neuropathy, multiple prior thoracolumbar spine surgeries for congenital scoliosis [place] >[place] ago [place] of [place] w/ removal of hardware (1991) who presented to [facility] on 9/28/24 with back pain and left hip pain s/p mechanical fall. CT imaging was performed and reported a T12 three column spine fracture-malalignment with fracture extending to the adjacent right 12th posterior rib and left T12-L1 facet joint and acute, displaced fracture of the left eighth lateral rib. Chronic appearing bilateral rib deformities. Surgical management was recommended and she is S/P T9-L3 open fusion with PSO/lag screw partial corpectomy/interbody by Dr. Stroud, Neurosurgeon with complex Plastic Surgery closure by Dr. Elias on 9/29/24. Post op course c/b CSK leak s/p repair, new onset paroxsymal Afib with RVR,  increased pain and drainage from surgical incision, development of pleural effusions, symptomatic orthostatic hypotension, urinary retention, post op anemia, and chest pain with spontaneous resolution. Cardiac work up negative. Patient was evaluated by PM&R and therapy for functional deficits, gait/ADL impairments and acute rehabilitation was recommended. Patient was cleared for discharge to [facility] IRF on 10/16/24.    Her admission was remarkable for a developing a fever of 103 with tachycardia and subjective fever/chills. Sepsis w/up was initiated and found to have small right upper lobe infiltrate, UTI, and MSSA bacteremia. CTH negative. Hospitalist and ID were consulted and per G+ bacteremia guidelines was recommended completing infectious workup, which includes MICHAEL which is not available at [facility]. Patient also required PICC line placement for long term ABx and a transfer request initiated from [place] to [facility] for MICHAEL and completion of bacteremia workup/treatment. All other medical co-morbidities were stable. Patient was deemed medically stable for discharge to [facility] medicine on 10/21/24 and was subsequently transferred on 10/23/24 for MICHAEL, advanced spine imaging, and further management. She was found to have a paraspinal abscess on MR imaging and is s/p paraspinal collection aspiration on 10/25/24. Culture grew gram positive cocci in pairs, with rare staph aureus. Neurosurgery advised and no acute surgical revision was deemed necessary. Repeat BCx was negative. Recommendation to continue long-term IV cefazolin via PICC until 12/12/24 (6 wks) with eventual transition to PO Duricef long term/indefinitely per ID. MICHAEL was negative for valvular vegetations but did reveal incidental PFO. Hospital course was significant for recurrent fevers, A fib (resolved) with source control and electrolyte correction, initiated on DOAc given elevated [id number]. Patient was evaluated by PM&R and therapy for functional deficits, gait/ADL impairments and acute rehabilitation was recommended. Patient was cleared for discharge to [facility] IRF on 10/30/24.   (30 Oct 2024 13:05)    TDD: 11/20/24 to Home  ___________________________________________________________________________    SUBJECTIVE/ROS  Patient was seen and evaluated at bedside today.  Reported no overnight events and is in no acute distress.  Patient briefed on ESR and CRP in decreasing trend. She has remained afebrile.  Case was discussed at Interdisciplinary Team meeting yesterday.  Drain site dressing unremarkable.  Episode of elevated BP discussed with Hospitalist.  Complained on pain on left posterolateral lower ribs. XR and diclofenac gel ordered.   A tentative discharge date is outlined above.  Patient is eager to continue participation on the recommended rehabilitation program.  Denies any CP, SOB, POLLARD, palpitations, fever, chills, body aches, cough, congestion, or any other symptoms at this time.   ___________________________________________________________________________    Vital Signs Last 24 Hrs  T(C): 36.4 (06 Nov 2024 08:28), Max: 36.7 (05 Nov 2024 20:55)  T(F): 97.6 (06 Nov 2024 08:28), Max: 98.1 (05 Nov 2024 20:55)  HR: 94 (06 Nov 2024 08:28) (94 - 100)  BP: 156/78 (06 Nov 2024 08:28) (129/86 - 160/85)  RR: 16 (06 Nov 2024 08:28) (16 - 16)  SpO2: 95% (06 Nov 2024 08:28) (95% - 96%)    ___________________________________________________________________________    LABS                          8.6    5.44  )-----------( 415      ( 04 Nov 2024 06:25 )             28.9       11-04    137  |  104  |  13  ----------------------------<  107[H]  3.8   |  27  |  0.75    Ca    8.9      04 Nov 2024 06:25    TPro  6.7  /  Alb  2.7[L]  /  TBili  0.4  /  DBili  x   /  AST  25  /  ALT  17  /  AlkPhos  194[H]  11-04      ESR Historical Values  Sedimentation Rate, Erythrocyte (11.04.24 @ 06:25)   Sedimentation Rate, Erythrocyte: 90 mm/hr  Sedimentation Rate, Erythrocyte (10.31.24 @ 06:00)   Sedimentation Rate, Erythrocyte: 95 mm/hr    CRP Historical Values  C-Reactive Protein (11.04.24 @ 06:25)   C-Reactive Protein: 50 mg/L  C-Reactive Protein (10.31.24 @ 06:00)   C-Reactive Protein: 56 mg/L  C-Reactive Protein (10.21.24 @ 06:20)   C-Reactive Protein: 183 mg/L      ___________________________________________________________________________    MEDICATIONS  (STANDING):  acetaminophen     Tablet .. 975 milliGRAM(s) Oral every 8 hours  apixaban 5 milliGRAM(s) Oral every 12 hours  atorvastatin 40 milliGRAM(s) Oral at bedtime  ceFAZolin   IVPB 2000 milliGRAM(s) IV Intermittent every 8 hours  chlorhexidine 2% Cloths 1 Application(s) Topical daily  cyclobenzaprine 10 milliGRAM(s) Oral three times a day  DULoxetine 20 milliGRAM(s) Oral daily  ergocalciferol 42838 Unit(s) Oral <User Schedule>  erythromycin    ethylsuccinate Suspension 40 mG/mL 128 milliGRAM(s) Oral every 12 hours  metoprolol tartrate 50 milliGRAM(s) Oral two times a day  montelukast 10 milliGRAM(s) Oral daily  multivitamin 1 Tablet(s) Oral daily  polyethylene glycol 3350 17 Gram(s) Oral two times a day  pramipexole 0.75 milliGRAM(s) Oral <User Schedule>  pregabalin 100 milliGRAM(s) Oral every 8 hours  senna 2 Tablet(s) Oral at bedtime  Vonoprazan (Voquezna) 10mg 1 Tablet(s),Vonoprazan (Voquezna) 10mg 1 Tablet(s) 1 Tablet(s) Oral <User Schedule>    MEDICATIONS  (PRN):  benzocaine/menthol Lozenge 1 Lozenge Oral every 3 hours PRN Mouth Sores  diphenhydrAMINE 25 milliGRAM(s) Oral two times a day PRN Rash and/or Itching  HYDROmorphone   Tablet 4 milliGRAM(s) Oral every 8 hours PRN Severe Pain (7 - 10)  melatonin 6 milliGRAM(s) Oral at bedtime PRN Insomnia    ___________________________________________________________________________    PHYSICAL EXAM:    Gen - NAD, Comfortable  HEENT - NCAT, EOMI, PERRLA  Pulm - CTAB, No wheeze, No rhonchi, No crackles  Cardiovascular - RRR, S1S2, No murmurs  Abdomen - Soft, mildly tender in RLQ, (+) BS throughout  Extremities - No C/C/E, No calf tenderness B/L  Neuro-     Cognitive - AAOx3, follows command     Motor -                     LEFT    UE - ShAB 4/5, EF 5/5, EE 5/5, WE 5/5,  5/5                    RIGHT UE - ShAB 4/5, EF 5/5, EE 5/5, WE 5/5,  5/5                    LEFT    LE - HF 4/5, KE 5/5, DF 5/5, PF 5/5                    RIGHT LE - HF 4/5, KE 5/5, DF 5/5, PF 5/5        Sensory - Intact to LT in UEs and LEs B/L   Psychiatric - Mood stable, Affect WNL  Skin: Midline dressing with drain exiting spine in place with minimal collection in bag. Clean dry and skin intact without erythema or breakdown. 2, 1-2cm  small incision sites, 1 to L and 1 to R of Thoracic spine at site of prior drains. Healing well with skin approximated and no drainage or signs of infection.    ___________________________________________________________________________

## 2024-11-06 NOTE — PROGRESS NOTE ADULT - SUBJECTIVE AND OBJECTIVE BOX
PROGRESS NOTE:     Patient is a 74y old  Female who presents with a chief complaint of Traumatic T9-L3 vertebral fracture s/p corpectomy and fusion surgery c/b MSSA Bacteremia 2/2 paraspinal abscess (05 Nov 2024 13:33)      SUBJECTIVE / OVERNIGHT EVENTS: pt was seen and evaluated    ADDITIONAL REVIEW OF SYSTEMS :as per HPI    MEDICATIONS  (STANDING):  acetaminophen     Tablet .. 975 milliGRAM(s) Oral every 8 hours  apixaban 5 milliGRAM(s) Oral every 12 hours  atorvastatin 40 milliGRAM(s) Oral at bedtime  ceFAZolin   IVPB 2000 milliGRAM(s) IV Intermittent every 8 hours  chlorhexidine 2% Cloths 1 Application(s) Topical daily  cyclobenzaprine 10 milliGRAM(s) Oral three times a day  DULoxetine 20 milliGRAM(s) Oral daily  ergocalciferol 59474 Unit(s) Oral <User Schedule>  erythromycin    ethylsuccinate Suspension 40 mG/mL 128 milliGRAM(s) Oral every 12 hours  metoprolol tartrate 50 milliGRAM(s) Oral two times a day  montelukast 10 milliGRAM(s) Oral daily  multivitamin 1 Tablet(s) Oral daily  polyethylene glycol 3350 17 Gram(s) Oral two times a day  pramipexole 0.75 milliGRAM(s) Oral <User Schedule>  pregabalin 100 milliGRAM(s) Oral every 8 hours  senna 2 Tablet(s) Oral at bedtime  Vonoprazan (Voquezna) 10mg 1 Tablet(s),Vonoprazan (Voquezna) 10mg 1 Tablet(s) 1 Tablet(s) Oral <User Schedule>    MEDICATIONS  (PRN):  benzocaine/menthol Lozenge 1 Lozenge Oral every 3 hours PRN Mouth Sores  diphenhydrAMINE 25 milliGRAM(s) Oral two times a day PRN Rash and/or Itching  HYDROmorphone   Tablet 4 milliGRAM(s) Oral every 8 hours PRN Severe Pain (7 - 10)  melatonin 6 milliGRAM(s) Oral at bedtime PRN Insomnia      CAPILLARY BLOOD GLUCOSE        I&O's Summary      PHYSICAL EXAM:  Vital Signs Last 24 Hrs  T(C): 36.4 (06 Nov 2024 08:28), Max: 36.7 (05 Nov 2024 20:55)  T(F): 97.6 (06 Nov 2024 08:28), Max: 98.1 (05 Nov 2024 20:55)  HR: 94 (06 Nov 2024 08:28) (94 - 100)  BP: 156/78 (06 Nov 2024 08:28) (129/86 - 160/85)  BP(mean): --  RR: 16 (06 Nov 2024 08:28) (16 - 16)  SpO2: 95% (06 Nov 2024 08:28) (95% - 96%)    Parameters below as of 06 Nov 2024 08:28  Patient On (Oxygen Delivery Method): room air        PHYSICAL EXAM:  GENERAL: NAD  CHEST/LUNG: No rales, rhonchi, wheezing; normal respiratory effort  HEART: RRR; No murmurs, rubs, or gallops  ABDOMEN: Soft, Nontender, Nondistended; Bowel sounds present  MSK/EXT:  No peripheral edema, 2+ Peripheral Pulses, No clubbing or digital cyanosis  PSYCH: Appropriate affect, Alert & Oriented    LABS:                    8.6    5.44  )-----------( 415      ( 04 Nov 2024 06:25 )             28.9       11-04    137  |  104  |  13  ----------------------------<  107  3.8   |  27  |  0.75    Ca    8.9      04 Nov 2024 06:25    TPro  6.7  /  Alb  2.7  /  TBili  0.4  /  DBili  x   /  AST  25  /  ALT  17  /  AlkPhos  194  11-04         discussed during IDR

## 2024-11-06 NOTE — PROGRESS NOTE ADULT - ASSESSMENT
Assessment/Plan:  Mrs. Haley French is a 74-year-old female patient with past medical history of OA, HTN, gastroparesis, peripheral neuropathy, multiple prior thoracolumbar spine surgeries for congenital scoliosis [place]/ [place] of hardware (1991) who is admitted for Acute Inpatient Rehabilitation with a multidisciplinary rehab program at [facility] with functional impairments in ADLs and mobility secondary to mechanical fall reporting a T12 three column spine fracture-malalignment treated surgically with a T9-L3 open fusion with PSO/lag screw partial corpectomy/interbody by Dr. Stroud, Neurosurgeon with complex Plastic Surgery closure by Dr. Elias on 9/29/24 subsequently complicated with MSSA bacteremia secondary to a paraspinal abscess s/p paraspinal collection aspiration on 10/25/24.     #Traumatic T9-L3 vertebral fracture s/p corpectomy and fusion surgery c/b MSSA Bacteremia 2/2 paraspinal abscess  - Paraspinal abscess with Sepsis due to methicillin susceptible Staphylococcus aureus.       * 10/20 Bcx MSSA, 10/21 no growth at 24hrs      * CXR with old rib fracture and small RUL infiltrate      * 10/20 +UA for Ecoli, follow urine culture      * Lactic acidosis resolved, lactate 1.6 10/23      * 10/23 Na 134 K 3.3 Phos 1.5      * MRI: rim-enhancing heterogeneous fluid collection suspicious for an abscess; culture growing GPC in pairs and rare staph aureus       * TTE: no evidence of vegetations      * No Acute surgical intervention per neurosurgery       * S/P paraspinal collection aspiration with paraspinal abscess drain placed with Dr. Guerra 10/25      * Repeat daily Bcx- negative      * Monitor WBC and fever curve      * S/P PICC insertion 10/28      * TLSO when OOB      * Cefazolin 2000mg IV q8 until 12/12/24 via PICC- until 12/12/24-> Start PO Duricef 500mg BID on 12/13/24 - long term/indefinitely   - Weekly CBC Diff BMP ESR CRP; emailed to [email or web address]  - (10/31) CRP 56, ESR 95   - Activity Limitations: Decreased social, vocational and leisure activities, decreased self care and ADLs, decreased mobility, decreased ability to manage household and finances.   - Comprehensive Multidisciplinary Rehab Program:      * 3 hours a day, 5 days a week.      * PT 2hr/day: Focused on improving strength, endurance, coordination, balance, functional mobility, and transfers      * OT 1hr/day: Focused on improving strength, fine motor skills, coordination, posture and ADLs.      -----------------------------------------------------------------------------------  Concurrent Medical Problems    #Paroxysmal atrial fibrillation.   - 10/25 ECG and exam notable for irregular rate and rhythm  - Metoprolol to 25 BID for rate control  - S/p heparin gtt 3500mg IV q6 for anticoagulation  - EKG - tachy to 110s this am in the setting of pain    #Acute on chronic low back pain.   - S/P T9-L3 open fusion with PSO/lag screw partial corpectomy/interbody with complex plastic closure (w/ Dr. Stroud and Dr. Tinajero on 9/29  - TLSO when OOB  - Pain management: Tylenol 975mg ATC TID,  Duloxetine 20mg daily, Lyrica 100mg TID , Flexeril 10mg TID standing,   - Start hydromorphone 4 mg PO q8h PRN  - Eliquis 5mg BID    #HTN  #Orthostatic Hypotension  #HLD  - 10/1 TTE: EF 61%  - S/P 1L IVF bolus (10/13) for symptomatic hypotension with good effect  - Metoprolol to 25 BID  - Atorvastatin 40mg HS  - Losartan 100 mg daily  - Chlorthalidone 25 mg daily on hold  (restart if SBP is consistently above 140 per cardio)  - Midodrine 7.5 mg on hold    #Postoperative anemia  - S/p PRBCs intraoperatively for T spine fusion   - Hgb stable with no evidence of active bleed at this time   - 10/30- Hgb 8.6/ Hct 28.9, (10/31) 9.4/30.3    - Monitor H/H; transfuse for Hgb <7    #SILVANA (obstructive sleep apnea).   - Transient desaturations on RA    - Nocturnal 2L O2 and PRN during day during naps, monitor sats   - F/u with PCP outpatient as may benefit from sleep study and CPAP.    #Restless legs syndrome (RLS).   - Pramipexole 0.75mg daily     #Seasonal allergies.   - Montelukast 10mg daily    #Liver cyst.  -10/10 CT Abd/pelvis- There is a large cyst, stable since prior exam. There are small hypodense foci on segment II and segment I too small to characterize, unchanged since prior  - F/u outpatient     #Elevated serum alkaline phosphatase level.   - Hi serum alk phos levels 247 --> 228 --> 172 --> 233 --> 239--> 240--> 247--> 234---> 276 (10/31) 227  - Monitor serum alk phos levels.    #Gastritis.   - Vonoprazan 10mg daily  - Erythromycin 128mg oral fluid BID    #Mood/Cognition:  Neuropsychology consult PRN    #Sleep:   Maintain quiet hours and low stim environment.  Melatonin 6mg HS PRN to maximize participation in therapy during the day.     #GI/Bowel:  Senna QHS  Miralax PRN Daily    #/Bladder:   - PVR q 8 hours (SC if > 400)    #Skin/Pressure Injury:   - Skin assessment on admission: Midline sine dressing with drain in place, with minimal drainage. No pressure injuries.    #Diet/Supplement  Current Diet: Regular- Dash diet   -Ergocalciferol 52756E weekly   -Multivitamin daily     #Precautions / PROPHYLAXIS:   - Falls,  Spinal  - ortho: Weight bearing status: WBAT, TLSO brace OOB  - Lungs: Incentive Spirometer   - DVT ppx: SCDs, TEDs, Eliquis 5mg BID  - Pressure injury/Skin:  OOB to Chair, PT/OT      ---------------  Code Status: FULL  Emergency Contact:    Outpatient Follow-up (Specialty/Name of physician):    Fran Carr  Internal Medicine  [address], [address]  [place] [address]  Phone: [phone]  Fax: [phone]  Follow Up Time:     Kaushal Waterman  Infectious Disease  [address] [place] [address]  Phone: [phone]  Follow Up Time:  [facility]  Neurosurgery  Referral Assistance Program    Guille Lance  [facility]  ORTHOSURG [address]  Scheduled Appointment: 11/22/2024    Catalino Cotto  [facility]ician [facility]  WEIGHTMGMT  [facility]  Scheduled Appointment: 12/05/2024    Haydee Bernstein  [facility]  GASTRO [address]  Scheduled Appointment: 01/06/2025      [facility] - Infectious Disease  Infectious Disease  [address], Infectious [place] [address]  Phone: [phone]    --------------

## 2024-11-07 LAB
ALBUMIN SERPL ELPH-MCNC: 3.1 G/DL — LOW (ref 3.3–5)
ALP SERPL-CCNC: 197 U/L — HIGH (ref 40–120)
ALT FLD-CCNC: 19 U/L — SIGNIFICANT CHANGE UP (ref 10–45)
ANION GAP SERPL CALC-SCNC: 7 MMOL/L — SIGNIFICANT CHANGE UP (ref 5–17)
AST SERPL-CCNC: 20 U/L — SIGNIFICANT CHANGE UP (ref 10–40)
BASOPHILS # BLD AUTO: 0.05 K/UL — SIGNIFICANT CHANGE UP (ref 0–0.2)
BASOPHILS NFR BLD AUTO: 1 % — SIGNIFICANT CHANGE UP (ref 0–2)
BILIRUB SERPL-MCNC: 0.4 MG/DL — SIGNIFICANT CHANGE UP (ref 0.2–1.2)
BUN SERPL-MCNC: 18 MG/DL — SIGNIFICANT CHANGE UP (ref 7–23)
CALCIUM SERPL-MCNC: 9.5 MG/DL — SIGNIFICANT CHANGE UP (ref 8.4–10.5)
CHLORIDE SERPL-SCNC: 103 MMOL/L — SIGNIFICANT CHANGE UP (ref 96–108)
CO2 SERPL-SCNC: 29 MMOL/L — SIGNIFICANT CHANGE UP (ref 22–31)
CREAT SERPL-MCNC: 0.9 MG/DL — SIGNIFICANT CHANGE UP (ref 0.5–1.3)
CRP SERPL-MCNC: 30 MG/L — HIGH
EGFR: 66 ML/MIN/1.73M2 — SIGNIFICANT CHANGE UP
EOSINOPHIL # BLD AUTO: 0.08 K/UL — SIGNIFICANT CHANGE UP (ref 0–0.5)
EOSINOPHIL NFR BLD AUTO: 1.5 % — SIGNIFICANT CHANGE UP (ref 0–6)
ERYTHROCYTE [SEDIMENTATION RATE] IN BLOOD: 93 MM/HR — HIGH (ref 0–20)
GLUCOSE SERPL-MCNC: 113 MG/DL — HIGH (ref 70–99)
HCT VFR BLD CALC: 32.5 % — LOW (ref 34.5–45)
HGB BLD-MCNC: 9.7 G/DL — LOW (ref 11.5–15.5)
IMM GRANULOCYTES NFR BLD AUTO: 0.4 % — SIGNIFICANT CHANGE UP (ref 0–0.9)
LYMPHOCYTES # BLD AUTO: 1.68 K/UL — SIGNIFICANT CHANGE UP (ref 1–3.3)
LYMPHOCYTES # BLD AUTO: 32 % — SIGNIFICANT CHANGE UP (ref 13–44)
MCHC RBC-ENTMCNC: 26.1 PG — LOW (ref 27–34)
MCHC RBC-ENTMCNC: 29.8 G/DL — LOW (ref 32–36)
MCV RBC AUTO: 87.4 FL — SIGNIFICANT CHANGE UP (ref 80–100)
MONOCYTES # BLD AUTO: 0.47 K/UL — SIGNIFICANT CHANGE UP (ref 0–0.9)
MONOCYTES NFR BLD AUTO: 9 % — SIGNIFICANT CHANGE UP (ref 2–14)
NEUTROPHILS # BLD AUTO: 2.95 K/UL — SIGNIFICANT CHANGE UP (ref 1.8–7.4)
NEUTROPHILS NFR BLD AUTO: 56.1 % — SIGNIFICANT CHANGE UP (ref 43–77)
NRBC # BLD: 0 /100 WBCS — SIGNIFICANT CHANGE UP (ref 0–0)
PLATELET # BLD AUTO: 483 K/UL — HIGH (ref 150–400)
POTASSIUM SERPL-MCNC: 4.1 MMOL/L — SIGNIFICANT CHANGE UP (ref 3.5–5.3)
POTASSIUM SERPL-SCNC: 4.1 MMOL/L — SIGNIFICANT CHANGE UP (ref 3.5–5.3)
PROT SERPL-MCNC: 7.6 G/DL — SIGNIFICANT CHANGE UP (ref 6–8.3)
RBC # BLD: 3.72 M/UL — LOW (ref 3.8–5.2)
RBC # FLD: 15.5 % — HIGH (ref 10.3–14.5)
SODIUM SERPL-SCNC: 139 MMOL/L — SIGNIFICANT CHANGE UP (ref 135–145)
WBC # BLD: 5.25 K/UL — SIGNIFICANT CHANGE UP (ref 3.8–10.5)
WBC # FLD AUTO: 5.25 K/UL — SIGNIFICANT CHANGE UP (ref 3.8–10.5)

## 2024-11-07 PROCEDURE — 99232 SBSQ HOSP IP/OBS MODERATE 35: CPT

## 2024-11-07 RX ADMIN — POLYETHYLENE GLYCOL 3350 17 GRAM(S): 17 POWDER, FOR SOLUTION ORAL at 06:36

## 2024-11-07 RX ADMIN — PREGABALIN 100 MILLIGRAM(S): 75 CAPSULE ORAL at 06:35

## 2024-11-07 RX ADMIN — ACETAMINOPHEN 500MG 975 MILLIGRAM(S): 500 TABLET, COATED ORAL at 14:33

## 2024-11-07 RX ADMIN — ACETAMINOPHEN 500MG 975 MILLIGRAM(S): 500 TABLET, COATED ORAL at 15:15

## 2024-11-07 RX ADMIN — METOPROLOL TARTRATE 50 MILLIGRAM(S): 100 TABLET, FILM COATED ORAL at 06:35

## 2024-11-07 RX ADMIN — APIXABAN 5 MILLIGRAM(S): 2.5 TABLET, FILM COATED ORAL at 17:34

## 2024-11-07 RX ADMIN — Medication 100 MILLIGRAM(S): at 21:34

## 2024-11-07 RX ADMIN — Medication 40 MILLIGRAM(S): at 21:34

## 2024-11-07 RX ADMIN — PREGABALIN 100 MILLIGRAM(S): 75 CAPSULE ORAL at 14:36

## 2024-11-07 RX ADMIN — PRAMIPEXOLE 0.75 MILLIGRAM(S): 1.5 TABLET, EXTENDED RELEASE ORAL at 14:37

## 2024-11-07 RX ADMIN — Medication 10 MILLIGRAM(S): at 21:34

## 2024-11-07 RX ADMIN — Medication 100 MILLIGRAM(S): at 06:35

## 2024-11-07 RX ADMIN — METOPROLOL TARTRATE 50 MILLIGRAM(S): 100 TABLET, FILM COATED ORAL at 17:34

## 2024-11-07 RX ADMIN — ACETAMINOPHEN 500MG 975 MILLIGRAM(S): 500 TABLET, COATED ORAL at 07:10

## 2024-11-07 RX ADMIN — DICLOFENAC SODIUM 2 GRAM(S): 20 SOLUTION TOPICAL at 06:36

## 2024-11-07 RX ADMIN — APIXABAN 5 MILLIGRAM(S): 2.5 TABLET, FILM COATED ORAL at 06:35

## 2024-11-07 RX ADMIN — Medication 10 MILLIGRAM(S): at 14:35

## 2024-11-07 RX ADMIN — PREGABALIN 100 MILLIGRAM(S): 75 CAPSULE ORAL at 21:34

## 2024-11-07 RX ADMIN — DICLOFENAC SODIUM 2 GRAM(S): 20 SOLUTION TOPICAL at 17:34

## 2024-11-07 RX ADMIN — Medication 128 MILLIGRAM(S): at 06:36

## 2024-11-07 RX ADMIN — Medication 128 MILLIGRAM(S): at 17:34

## 2024-11-07 RX ADMIN — ACETAMINOPHEN 500MG 975 MILLIGRAM(S): 500 TABLET, COATED ORAL at 22:05

## 2024-11-07 RX ADMIN — Medication 2 TABLET(S): at 21:34

## 2024-11-07 RX ADMIN — ACETAMINOPHEN 500MG 975 MILLIGRAM(S): 500 TABLET, COATED ORAL at 21:35

## 2024-11-07 RX ADMIN — ACETAMINOPHEN 500MG 975 MILLIGRAM(S): 500 TABLET, COATED ORAL at 06:35

## 2024-11-07 RX ADMIN — Medication 100 MILLIGRAM(S): at 14:33

## 2024-11-07 RX ADMIN — Medication 10 MILLIGRAM(S): at 06:35

## 2024-11-07 NOTE — PRE-OP CHECKLIST - RESPIRATORY RATE (BREATHS/MIN)
Outpatient Care Management  Plan of Care Follow Up Visit    Patient: Neena Kohli  MRN: [id number]  Date of Service: 11/07/2024  Completed by: Chacha Patricia RN  Referral Date: 07/04/2024    Reason for Visit   Patient presents with    OPCM Chart Review     11/7/24    OPCM RN Follow Up Call     11/7/24       Brief Summary: OPCM follow up complete. Continued education on CKD. Patient is in agreement with follow up call on or around 11/21/24.     
16

## 2024-11-07 NOTE — PROGRESS NOTE ADULT - ASSESSMENT
Assessment/Plan:  Mrs. Haley French is a 74-year-old female patient with past medical history of OA, HTN, gastroparesis, peripheral neuropathy, multiple prior thoracolumbar spine surgeries for congenital scoliosis [place] >[place] w/ removal of hardware (1991) who is admitted for Acute Inpatient Rehabilitation with a multidisciplinary rehab program at [facility] with functional impairments in ADLs and mobility secondary to mechanical fall reporting a T12 three column spine fracture-malalignment treated surgically with a T9-L3 open fusion with PSO/lag screw partial corpectomy/interbody by Dr. Stroud, Neurosurgeon with complex Plastic Surgery closure by Dr. Elias on 9/29/24 subsequently complicated with MSSA bacteremia secondary to a paraspinal abscess s/p paraspinal collection aspiration on 10/25/24.     #Traumatic T9-L3 vertebral fracture s/p corpectomy and fusion surgery c/b MSSA Bacteremia 2/2 paraspinal abscess  - Paraspinal abscess with Sepsis due to methicillin susceptible Staphylococcus aureus.       * 10/20 Bcx MSSA, 10/21 no growth at 24hrs      * CXR with old rib fracture and small RUL infiltrate      * 10/20 +UA for Ecoli, follow urine culture      * Lactic acidosis resolved, lactate 1.6 10/23      * 10/23 Na 134 K 3.3 Phos 1.5      * MRI: rim-enhancing heterogeneous fluid collection suspicious for an abscess; culture growing GPC in pairs and rare staph aureus       * TTE: no evidence of vegetations      * No Acute surgical intervention per neurosurgery       * S/P paraspinal collection aspiration with paraspinal abscess drain placed with Dr. Guerra 10/25      * Repeat daily Bcx- negative      * Monitor WBC and fever curve      * S/P PICC insertion 10/28      * TLSO when OOB      * Cefazolin 2000mg IV q8 until 12/12/24 via PICC- until 12/12/24-> Start PO Duricef 500mg BID on 12/13/24 - long term/indefinitely   - Weekly CBC Diff BMP ESR CRP; emailed to [email or web address]  - (10/31) CRP 56, ESR 95   - Activity Limitations: Decreased social, vocational and leisure activities, decreased self care and ADLs, decreased mobility, decreased ability to manage household and finances.   - Comprehensive Multidisciplinary Rehab Program:      * 3 hours a day, 5 days a week.      * PT 2hr/day: Focused on improving strength, endurance, coordination, balance, functional mobility, and transfers      * OT 1hr/day: Focused on improving strength, fine motor skills, coordination, posture and ADLs.      -----------------------------------------------------------------------------------  Concurrent Medical Problems    #Paroxysmal atrial fibrillation.   - 10/25 ECG and exam notable for irregular rate and rhythm  - Metoprolol to 25 BID for rate control  - S/p heparin gtt 3500mg IV q6 for anticoagulation  - EKG - tachy to 110s this am in the setting of pain    #Acute on chronic low back pain.   - S/P T9-L3 open fusion with PSO/lag screw partial corpectomy/interbody with complex plastic closure (w/ Dr. Stroud and Dr. Tinajero on 9/29  - TLSO when OOB  - Pain management: Tylenol 975mg ATC TID,  Duloxetine 20mg daily, Lyrica 100mg TID , Flexeril 10mg TID standing,   - Start hydromorphone 4 mg PO q8h PRN  - Eliquis 5mg BID    #HTN  #Orthostatic Hypotension  #HLD  - 10/1 TTE: EF 61%  - S/P 1L IVF bolus (10/13) for symptomatic hypotension with good effect  - Metoprolol to 25 BID  - Atorvastatin 40mg HS  - Losartan 100 mg daily  - Chlorthalidone 25 mg daily on hold  (restart if SBP is consistently above 140 per cardio)  - Midodrine 7.5 mg on hold    #Postoperative anemia  - S/p PRBCs intraoperatively for T spine fusion   - Hgb stable with no evidence of active bleed at this time   - 10/30- Hgb 8.6/ Hct 28.9, (10/31) 9.4/30.3    - Monitor H/H; transfuse for Hgb <7    #SILVANA (obstructive sleep apnea).   - Transient desaturations on RA    - Nocturnal 2L O2 and PRN during day during naps, monitor sats   - F/u with PCP outpatient as may benefit from sleep study and CPAP.    #Restless legs syndrome (RLS).   - Pramipexole 0.75mg daily     #Seasonal allergies.   - Montelukast 10mg daily    #Liver cyst.  -10/10 CT Abd/pelvis- There is a large cyst, stable since prior exam. There are small hypodense foci on segment II and segment I too small to characterize, unchanged since prior  - F/u outpatient     #Elevated serum alkaline phosphatase level.   - Hi serum alk phos levels 247 --> 228 --> 172 --> 233 --> 239--> 240--> 247--> 234---> 276 (10/31) 227  - Monitor serum alk phos levels.    #Gastritis.   - Vonoprazan 10mg daily  - Erythromycin 128mg oral fluid BID    #Mood/Cognition:  Neuropsychology consult PRN    #Sleep:   Maintain quiet hours and low stim environment.  Melatonin 6mg HS PRN to maximize participation in therapy during the day.     #GI/Bowel:  Senna QHS  Miralax PRN Daily    #/Bladder:   - PVR q 8 hours (SC if > 400)    #Skin/Pressure Injury:   - Skin assessment on admission: Midline sine dressing with drain in place, with minimal drainage. No pressure injuries.    #Diet/Supplement  Current Diet: Regular- Dash diet   -Ergocalciferol 97339K weekly   -Multivitamin daily     #Precautions / PROPHYLAXIS:   - Falls,  Spinal  - ortho: Weight bearing status: WBAT, TLSO brace OOB  - Lungs: Incentive Spirometer   - DVT ppx: SCDs, TEDs, Eliquis 5mg BID  - Pressure injury/Skin:  OOB to Chair, PT/OT      ---------------  Code Status: FULL  Emergency Contact:    Outpatient Follow-up (Specialty/Name of physician):    Fran Carr  Internal Medicine  [address], [address]  [place] [address]  Phone: [phone]  Fax: [phone]  Follow Up Time:     Kaushal Waterman  Infectious Disease  [address] [place] [address]  Phone: [phone]  Follow Up Time:  [facility] Neurosurgery  Neurosurgery  Referral Assistance Program    Guille Lance  [facility]  ORTHOSURG [address]  Scheduled Appointment: 11/22/2024    Catalino Cotto  [facility]  WEIGHT[employer]MT  [facility]  Scheduled Appointment: 12/05/2024    Haydee Bernstein  [facility]  GASTRO [address]  Scheduled Appointment: 01/06/2025      [facility] - Infectious Disease  Infectious Disease  [address], [place] [address]  Phone: [phone]    --------------

## 2024-11-07 NOTE — H&P PST ADULT - NS PRO LACT YNNA
month, I will start adjusting my insulin doses on my own. 6   6. Within the next month, I will begin making changes to my diabetes management based on my daily blood sugars (eg - eating, activity and/or insulin). 6   7. Within the next month, I will begin making changes to my diabetes management to meet my overall goals (eg - eating, activity and/or insulin). 7               no

## 2024-11-07 NOTE — PROGRESS NOTE ADULT - SUBJECTIVE AND OBJECTIVE BOX
HPI:  Mrs. Haley French is a 74-year-old female patient with past medical history of OA, HTN, gastroparesis, peripheral neuropathy, multiple prior thoracolumbar spine surgeries for congenital scoliosis done >10 years ago out of [place] w/ removal of hardware (1991) who presented to [facility] on 9/28/24 with back pain and left hip pain s/p mechanical fall. CT imaging was performed and reported a T12 three column spine fracture-malalignment with fracture extending to the adjacent right 12th posterior rib and left T12-L1 facet joint and acute, displaced fracture of the left eighth lateral rib. Chronic appearing bilateral rib deformities. Surgical management was recommended and she is S/P T9-L3 open fusion with PSO/lag screw partial corpectomy/interbody by Dr. Stroud, Neurosurgeon with complex Plastic Surgery closure by Dr. Eilas on 9/29/24. Post op course c/b CSK leak s/p repair, new onset paroxsymal Afib with RVR,  increased pain and drainage from surgical incision, development of pleural effusions, symptomatic orthostatic hypotension, urinary retention, post op anemia, and chest pain with spontaneous resolution. Cardiac work up negative. Patient was evaluated by PM&R and therapy for functional deficits, gait/ADL impairments and acute rehabilitation was recommended. Patient was cleared for discharge to [facility] IRF on 10/16/24.    Her admission was remarkable for a developing a fever of 103 with tachycardia and subjective fever/chills. Sepsis w/up was initiated and found to have small right upper lobe infiltrate, UTI, and MSSA bacteremia. CTH negative. Hospitalist and ID were consulted and per G+ bacteremia guidelines was recommended completing infectious workup, which includes MICHAEL which is not available at [facility]. Patient also required PICC line placement for long term ABx and a transfer request initiated from [place] to [facility] for MICHAEL and completion of bacteremia workup/treatment. All other medical co-morbidities were stable. Patient was deemed medically stable for discharge to [facility] medicine on 10/21/24 and was subsequently transferred on 10/23/24 for MICHAEL, advanced spine imaging, and further management. She was found to have a paraspinal abscess on MR imaging and is s/p paraspinal collection aspiration on 10/25/24. Culture grew gram positive cocci in pairs, with rare staph aureus. Neurosurgery advised and no acute surgical revision was deemed necessary. Repeat BCx was negative. Recommendation to continue long-term IV cefazolin via PICC until 12/12/24 (6 wks) with eventual transition to PO Duricef long term/indefinitely per ID. MICHAEL was negative for valvular vegetations but did reveal incidental PFO. Hospital course was significant for recurrent fevers, A fib (resolved) with source control and electrolyte correction, initiated on DOAc given elevated [id number]. Patient was evaluated by PM&R and therapy for functional deficits, gait/ADL impairments and acute rehabilitation was recommended. Patient was cleared for discharge to [facility] IRF on 10/30/24.   (30 Oct 2024 13:05)    TDD: 11/20/24 to Home  ___________________________________________________________________________    SUBJECTIVE/ROS  Patient was seen and evaluated at bedside today.  Reported no overnight events and is in no acute distress.  Case was again discussed at Interdisciplinary Team meeting today.  Drain site dressing remains unremarkable.  Episode of elevated BP discussed with Hospitalist.  Improvement noted in pain on left posterolateral lower ribs.  A tentative discharge date is outlined above.  Patient is eager to continue participation on the recommended rehabilitation program.  Denies any CP, SOB, POLLARD, palpitations, fever, chills, body aches, cough, congestion, or any other symptoms at this time.   ___________________________________________________________________________    Vital Signs Last 24 Hrs  T(C): 36.7 (07 Nov 2024 07:25), Max: 36.7 (06 Nov 2024 20:44)  T(F): 98.1 (07 Nov 2024 07:25), Max: 98.1 (07 Nov 2024 07:25)  HR: 93 (07 Nov 2024 17:24) (85 - 93)  BP: 125/80 (07 Nov 2024 17:24) (125/80 - 176/82)  RR: 16 (07 Nov 2024 07:25) (16 - 16)  SpO2: 96% (07 Nov 2024 07:25) (95% - 96%)    ___________________________________________________________________________    LAB                        9.7    5.25  )-----------( 483      ( 07 Nov 2024 06:31 )             32.5     11-07    139  |  103  |  18  ----------------------------<  113[H]  4.1   |  29  |  0.90    Ca    9.5      07 Nov 2024 06:31    TPro  7.6  /  Alb  3.1[L]  /  TBili  0.4  /  DBili  x   /  AST  20  /  ALT  19  /  AlkPhos  197[H]  11-07    LIVER FUNCTIONS - ( 07 Nov 2024 06:31 )  Alb: 3.1 g/dL / Pro: 7.6 g/dL / ALK PHOS: 197 U/L / ALT: 19 U/L / AST: 20 U/L / GGT: x             ESR Historical Values  Sedimentation Rate, Erythrocyte (11.04.24 @ 06:25)   Sedimentation Rate, Erythrocyte: 90 mm/hr  Sedimentation Rate, Erythrocyte (10.31.24 @ 06:00)   Sedimentation Rate, Erythrocyte: 95 mm/hr    CRP Historical Values  C-Reactive Protein (11.04.24 @ 06:25)   C-Reactive Protein: 50 mg/L  C-Reactive Protein (10.31.24 @ 06:00)   C-Reactive Protein: 56 mg/L  C-Reactive Protein (10.21.24 @ 06:20)   C-Reactive Protein: 183 mg/L    ___________________________________________________________________________    MEDICATIONS  (STANDING):  acetaminophen     Tablet .. 975 milliGRAM(s) Oral every 8 hours  apixaban 5 milliGRAM(s) Oral every 12 hours  atorvastatin 40 milliGRAM(s) Oral at bedtime  ceFAZolin   IVPB 2000 milliGRAM(s) IV Intermittent every 8 hours  chlorhexidine 2% Cloths 1 Application(s) Topical daily  cyclobenzaprine 10 milliGRAM(s) Oral three times a day  diclofenac sodium 1% Gel 2 Gram(s) Topical two times a day  DULoxetine 20 milliGRAM(s) Oral daily  ergocalciferol 76496 Unit(s) Oral <User Schedule>  erythromycin    ethylsuccinate Suspension 40 mG/mL 128 milliGRAM(s) Oral every 12 hours  metoprolol tartrate 50 milliGRAM(s) Oral two times a day  montelukast 10 milliGRAM(s) Oral daily  multivitamin 1 Tablet(s) Oral daily  polyethylene glycol 3350 17 Gram(s) Oral two times a day  pramipexole 0.75 milliGRAM(s) Oral <User Schedule>  pregabalin 100 milliGRAM(s) Oral every 8 hours  senna 2 Tablet(s) Oral at bedtime  Vonoprazan (Voquezna) 10mg 1 Tablet(s),Vonoprazan (Voquezna) 10mg 1 Tablet(s) 1 Tablet(s) Oral <User Schedule>    MEDICATIONS  (PRN):  benzocaine/menthol Lozenge 1 Lozenge Oral every 3 hours PRN Mouth Sores  diphenhydrAMINE 25 milliGRAM(s) Oral two times a day PRN Rash and/or Itching  HYDROmorphone   Tablet 4 milliGRAM(s) Oral every 8 hours PRN Severe Pain (7 - 10)  melatonin 6 milliGRAM(s) Oral at bedtime PRN Insomnia    ___________________________________________________________________________    PHYSICAL EXAM:    Gen - NAD, Comfortable  HEENT - NCAT, EOMI, PERRLA  Pulm - CTAB, No wheeze, No rhonchi, No crackles  Cardiovascular - RRR, S1S2, No murmurs  Abdomen - Soft, mildly tender in RLQ, (+) BS throughout  Extremities - No C/C/E, No calf tenderness B/L  Neuro-     Cognitive - AAOx3, follows command     Motor -                     LEFT    UE - ShAB 4/5, EF 5/5, EE 5/5, WE 5/5,  5/5                    RIGHT UE - ShAB 4/5, EF 5/5, EE 5/5, WE 5/5,  5/5                    LEFT    LE - HF 4/5, KE 5/5, DF 5/5, PF 5/5                    RIGHT LE - HF 4/5, KE 5/5, DF 5/5, PF 5/5        Sensory - Intact to LT in UEs and LEs B/L   Psychiatric - Mood stable, Affect WNL  Skin: Midline dressing with drain exiting spine in place with minimal collection in bag. Clean dry and skin intact without erythema or breakdown. 2, 1-2cm  small incision sites, 1 to L and 1 to R of Thoracic spine at site of prior drains. Healing well with skin approximated and no drainage or signs of infection.    ___________________________________________________________________________

## 2024-11-08 PROCEDURE — 99232 SBSQ HOSP IP/OBS MODERATE 35: CPT

## 2024-11-08 RX ADMIN — Medication 10 MILLIGRAM(S): at 06:06

## 2024-11-08 RX ADMIN — ACETAMINOPHEN 500MG 975 MILLIGRAM(S): 500 TABLET, COATED ORAL at 06:40

## 2024-11-08 RX ADMIN — APIXABAN 5 MILLIGRAM(S): 2.5 TABLET, FILM COATED ORAL at 06:07

## 2024-11-08 RX ADMIN — Medication 25 MILLIGRAM(S): at 23:25

## 2024-11-08 RX ADMIN — Medication 100 MILLIGRAM(S): at 14:59

## 2024-11-08 RX ADMIN — ACETAMINOPHEN 500MG 975 MILLIGRAM(S): 500 TABLET, COATED ORAL at 06:07

## 2024-11-08 RX ADMIN — Medication 2 TABLET(S): at 22:02

## 2024-11-08 RX ADMIN — PREGABALIN 100 MILLIGRAM(S): 75 CAPSULE ORAL at 15:02

## 2024-11-08 RX ADMIN — Medication 10 MILLIGRAM(S): at 15:00

## 2024-11-08 RX ADMIN — Medication 20 MILLIGRAM(S): at 11:15

## 2024-11-08 RX ADMIN — ACETAMINOPHEN 500MG 975 MILLIGRAM(S): 500 TABLET, COATED ORAL at 15:00

## 2024-11-08 RX ADMIN — PREGABALIN 100 MILLIGRAM(S): 75 CAPSULE ORAL at 06:06

## 2024-11-08 RX ADMIN — Medication 1 TABLET(S): at 11:15

## 2024-11-08 RX ADMIN — DICLOFENAC SODIUM 2 GRAM(S): 20 SOLUTION TOPICAL at 17:06

## 2024-11-08 RX ADMIN — APIXABAN 5 MILLIGRAM(S): 2.5 TABLET, FILM COATED ORAL at 17:06

## 2024-11-08 RX ADMIN — PRAMIPEXOLE 0.75 MILLIGRAM(S): 1.5 TABLET, EXTENDED RELEASE ORAL at 15:00

## 2024-11-08 RX ADMIN — ACETAMINOPHEN 500MG 975 MILLIGRAM(S): 500 TABLET, COATED ORAL at 16:13

## 2024-11-08 RX ADMIN — Medication 40 MILLIGRAM(S): at 22:03

## 2024-11-08 RX ADMIN — Medication 100 MILLIGRAM(S): at 22:02

## 2024-11-08 RX ADMIN — Medication 50000 UNIT(S): at 06:06

## 2024-11-08 RX ADMIN — Medication 128 MILLIGRAM(S): at 06:08

## 2024-11-08 RX ADMIN — PREGABALIN 100 MILLIGRAM(S): 75 CAPSULE ORAL at 22:05

## 2024-11-08 RX ADMIN — Medication 10 MILLIGRAM(S): at 22:03

## 2024-11-08 RX ADMIN — METOPROLOL TARTRATE 50 MILLIGRAM(S): 100 TABLET, FILM COATED ORAL at 06:07

## 2024-11-08 RX ADMIN — DICLOFENAC SODIUM 2 GRAM(S): 20 SOLUTION TOPICAL at 06:08

## 2024-11-08 RX ADMIN — CHLORHEXIDINE GLUCONATE 1 APPLICATION(S): 1.2 RINSE ORAL at 11:16

## 2024-11-08 RX ADMIN — ACETAMINOPHEN 500MG 975 MILLIGRAM(S): 500 TABLET, COATED ORAL at 22:35

## 2024-11-08 RX ADMIN — METOPROLOL TARTRATE 50 MILLIGRAM(S): 100 TABLET, FILM COATED ORAL at 17:06

## 2024-11-08 RX ADMIN — ACETAMINOPHEN 500MG 975 MILLIGRAM(S): 500 TABLET, COATED ORAL at 22:03

## 2024-11-08 RX ADMIN — Medication 100 MILLIGRAM(S): at 06:07

## 2024-11-08 RX ADMIN — MONTELUKAST SODIUM 10 MILLIGRAM(S): 10 TABLET ORAL at 11:15

## 2024-11-08 NOTE — PROGRESS NOTE ADULT - SUBJECTIVE AND OBJECTIVE BOX
HPI:  Mrs. Haley French is a 74-year-old female patient with past medical history of OA, HTN, gastroparesis, peripheral neuropathy, multiple prior thoracolumbar spine surgeries for congenital scoliosis [place] >[place] w/ removal of hardware (1991) who presented to [facility] on 9/28/24 with back pain and left hip pain s/p mechanical fall. CT imaging was performed and reported a T12 three column spine fracture-malalignment with fracture extending to the adjacent right 12th posterior rib and left T12-L1 facet joint and acute, displaced fracture of the left eighth lateral rib. Chronic appearing bilateral rib deformities. Surgical management was recommended and she is S/P T9-L3 open fusion with PSO/lag screw partial corpectomy/interbody by Dr. Stroud, Neurosurgeon with complex Plastic Surgery closure by Dr. Elias on 9/29/24. Post op course c/b CSK leak s/p repair, new onset paroxsymal Afib with RVR,  increased pain and drainage from surgical incision, development of pleural effusions, symptomatic orthostatic hypotension, urinary retention, post op anemia, and chest pain with spontaneous resolution. Cardiac work up negative. Patient was evaluated by PM&R and therapy for functional deficits, gait/ADL impairments and acute rehabilitation was recommended. Patient was cleared for discharge to [facility] IRF on 10/16/24.    Her admission was remarkable for a developing a fever of 103 with tachycardia and subjective fever/chills. Sepsis w/up was initiated and found to have small right upper lobe infiltrate, UTI, and MSSA bacteremia. CTH negative. Hospitalist and ID were consulted and per G+ bacteremia guidelines was recommended completing infectious workup, which includes MICHAEL which is not available at [facility]. Patient also required PICC line placement for long term ABx and a transfer request initiated from [place] to [facility] for MICHAEL and completion of bacteremia workup/treatment. All other medical co-morbidities were stable. Patient was deemed medically stable for discharge to [facility] medicine on 10/21/24 and was subsequently transferred on 10/23/24 for MICHAEL, advanced spine imaging, and further management. She was found to have a paraspinal abscess on MR imaging and is s/p paraspinal collection aspiration on 10/25/24. Culture grew gram positive cocci in pairs, with rare staph aureus. Neurosurgery advised and no acute surgical revision was deemed necessary. Repeat BCx was negative. Recommendation to continue long-term IV cefazolin via PICC until 12/12/24 (6 wks) with eventual transition to PO Duricef long term/indefinitely per ID. MICHAEL was negative for valvular vegetations but did reveal incidental PFO. Hospital course was significant for recurrent fevers, A fib (resolved) with source control and electrolyte correction, initiated on DOAc given elevated [id number]. Patient was evaluated by PM&R and therapy for functional deficits, gait/ADL impairments and acute rehabilitation was recommended. Patient was cleared for discharge to [facility] IRF on 10/30/24.   (30 Oct 2024 13:05)    TDD: 11/20/24 to Home  ___________________________________________________________________________    SUBJECTIVE/ROS  Patient was seen and evaluated at bedside today.  Reported no overnight events and is in no acute distress.  Currently with minimal to no output on drain.   Reassessment of drain per IF next week to consider discontinuation.  Drain site dressing remains unremarkable.  Improved BP control with unremarkable imaging as below..  A tentative discharge date is outlined above.  Patient is eager to continue participation on the recommended rehabilitation program.  Denies any CP, SOB, POLLARD, palpitations, fever, chills, body aches, cough, congestion, or any other symptoms at this time.   ___________________________________________________________________________    Vital Signs Last 24 Hrs  T(C): 36.8 (07 Nov 2024 19:39), Max: 36.8 (07 Nov 2024 19:39)  T(F): 98.3 (07 Nov 2024 19:39), Max: 98.3 (07 Nov 2024 19:39)  HR: 96 (08 Nov 2024 06:10) (82 - 96)  BP: 138/71 (08 Nov 2024 06:10) (119/64 - 138/71)  RR: 16 (08 Nov 2024 06:10) (16 - 16)  SpO2: 95% (08 Nov 2024 06:10) (95% - 96%)    ___________________________________________________________________________    LAB                        9.7    5.25  )-----------( 483      ( 07 Nov 2024 06:31 )             32.5     11-07    139  |  103  |  18  ----------------------------<  113[H]  4.1   |  29  |  0.90    Ca    9.5      07 Nov 2024 06:31    TPro  7.6  /  Alb  3.1[L]  /  TBili  0.4  /  DBili  x   /  AST  20  /  ALT  19  /  AlkPhos  197[H]  11-07    LIVER FUNCTIONS - ( 07 Nov 2024 06:31 )  Alb: 3.1 g/dL / Pro: 7.6 g/dL / ALK PHOS: 197 U/L / ALT: 19 U/L / AST: 20 U/L / GGT: x             ESR Historical Values  Sedimentation Rate, Erythrocyte (11.04.24 @ 06:25)   Sedimentation Rate, Erythrocyte: 90 mm/hr  Sedimentation Rate, Erythrocyte (10.31.24 @ 06:00)   Sedimentation Rate, Erythrocyte: 95 mm/hr    CRP Historical Values  C-Reactive Protein (11.04.24 @ 06:25)   C-Reactive Protein: 50 mg/L  C-Reactive Protein (10.31.24 @ 06:00)   C-Reactive Protein: 56 mg/L  C-Reactive Protein (10.21.24 @ 06:20)   C-Reactive Protein: 183 mg/L    ___________________________________________________________________________    MEDICATIONS  (STANDING):  acetaminophen     Tablet .. 975 milliGRAM(s) Oral every 8 hours  apixaban 5 milliGRAM(s) Oral every 12 hours  atorvastatin 40 milliGRAM(s) Oral at bedtime  ceFAZolin   IVPB 2000 milliGRAM(s) IV Intermittent every 8 hours  chlorhexidine 2% Cloths 1 Application(s) Topical daily  cyclobenzaprine 10 milliGRAM(s) Oral three times a day  diclofenac sodium 1% Gel 2 Gram(s) Topical two times a day  DULoxetine 20 milliGRAM(s) Oral daily  ergocalciferol 44345 Unit(s) Oral <User Schedule>  erythromycin    ethylsuccinate Suspension 40 mG/mL 128 milliGRAM(s) Oral every 12 hours  metoprolol tartrate 50 milliGRAM(s) Oral two times a day  montelukast 10 milliGRAM(s) Oral daily  multivitamin 1 Tablet(s) Oral daily  polyethylene glycol 3350 17 Gram(s) Oral two times a day  pramipexole 0.75 milliGRAM(s) Oral <User Schedule>  pregabalin 100 milliGRAM(s) Oral every 8 hours  senna 2 Tablet(s) Oral at bedtime  Vonoprazan (Voquezna) 10mg 1 Tablet(s),Vonoprazan (Voquezna) 10mg 1 Tablet(s) 1 Tablet(s) Oral <User Schedule>    MEDICATIONS  (PRN):  benzocaine/menthol Lozenge 1 Lozenge Oral every 3 hours PRN Mouth Sores  diphenhydrAMINE 25 milliGRAM(s) Oral two times a day PRN Rash and/or Itching  HYDROmorphone   Tablet 4 milliGRAM(s) Oral every 8 hours PRN Severe Pain (7 - 10)  melatonin 6 milliGRAM(s) Oral at bedtime PRN Insomnia    ___________________________________________________________________________    PHYSICAL EXAM:    Gen - NAD, Comfortable  HEENT - NCAT, EOMI, PERRLA  Pulm - CTAB, No wheeze, No rhonchi, No crackles  Cardiovascular - RRR, S1S2, No murmurs  Abdomen - Soft, mildly tender in RLQ, (+) BS throughout  Extremities - No C/C/E, No calf tenderness B/L  Neuro-     Cognitive - AAOx3, follows command     Motor -                     LEFT    UE - ShAB 4/5, EF 5/5, EE 5/5, WE 5/5,  5/5                    RIGHT UE - ShAB 4/5, EF 5/5, EE 5/5, WE 5/5,  5/5                    LEFT    LE - HF 4/5, KE 5/5, DF 5/5, PF 5/5                    RIGHT LE - HF 4/5, KE 5/5, DF 5/5, PF 5/5        Sensory - Intact to LT in UEs and LEs B/L   Psychiatric - Mood stable, Affect WNL  Skin: Midline dressing with drain exiting spine in place with minimal to no collection in bag. Clean dry and skin intact without erythema or breakdown. 2, 1-2cm  small incision sites, 1 to L and 1 to R of Thoracic spine at site of prior drains. Healing well with skin approximated and no drainage or signs of infection.    ___________________________________________________________________________

## 2024-11-08 NOTE — PROGRESS NOTE ADULT - ASSESSMENT
Assessment/Plan:  Mrs. Haley French is a 74-year-old female patient with past medical history of OA, HTN, gastroparesis, peripheral neuropathy, multiple prior thoracolumbar spine surgeries for congenital scoliosis [place] >[place] of [place] w/ removal of hardware (1991) who is admitted for Acute Inpatient Rehabilitation with a multidisciplinary rehab program at [facility] with functional impairments in ADLs and mobility secondary to mechanical fall reporting a T12 three column spine fracture-malalignment treated surgically with a T9-L3 open fusion with PSO/lag screw partial corpectomy/interbody by Dr. Stroud, Neurosurgeon with complex Plastic Surgery closure by Dr. Elias on 9/29/24 subsequently complicated with MSSA bacteremia secondary to a paraspinal abscess s/p paraspinal collection aspiration on 10/25/24.     #Traumatic T9-L3 vertebral fracture s/p corpectomy and fusion surgery c/b MSSA Bacteremia 2/2 paraspinal abscess  - Paraspinal abscess with Sepsis due to methicillin susceptible Staphylococcus aureus.       * 10/20 Bcx MSSA, 10/21 no growth at 24hrs      * CXR with old rib fracture and small RUL infiltrate      * 10/20 +UA for Ecoli, follow urine culture      * Lactic acidosis resolved, lactate 1.6 10/23      * 10/23 Na 134 K 3.3 Phos 1.5      * MRI: rim-enhancing heterogeneous fluid collection suspicious for an abscess; culture growing GPC in pairs and rare staph aureus       * TTE: no evidence of vegetations      * No Acute surgical intervention per neurosurgery       * S/P paraspinal collection aspiration with paraspinal abscess drain placed with Dr. Guerra 10/25      * Repeat daily Bcx- negative      * Monitor WBC and fever curve      * S/P PICC insertion 10/28      * TLSO when OOB      * Cefazolin 2000mg IV q8 until 12/12/24 via PICC- until 12/12/24-> Start PO Duricef 500mg BID on 12/13/24 - long term/indefinitely   - Weekly CBC Diff BMP ESR CRP; emailed to [email or web address]  - (10/31) CRP 56, ESR 95   - Activity Limitations: Decreased social, vocational and leisure activities, decreased self care and ADLs, decreased mobility, decreased ability to manage household and finances.   - Comprehensive Multidisciplinary Rehab Program:      * 3 hours a day, 5 days a week.      * PT 2hr/day: Focused on improving strength, endurance, coordination, balance, functional mobility, and transfers      * OT 1hr/day: Focused on improving strength, fine motor skills, coordination, posture and ADLs.      -----------------------------------------------------------------------------------  Concurrent Medical Problems    #Paroxysmal atrial fibrillation.   - 10/25 ECG and exam notable for irregular rate and rhythm  - Metoprolol to 25 BID for rate control  - S/p heparin gtt 3500mg IV q6 for anticoagulation  - EKG - tachy to 110s this am in the setting of pain    #Acute on chronic low back pain.   - S/P T9-L3 open fusion with PSO/lag screw partial corpectomy/interbody with complex plastic closure (w/ Dr. Stroud and Dr. Tinajero on 9/29  - TLSO when OOB  - Pain management: Tylenol 975mg ATC TID,  Duloxetine 20mg daily, Lyrica 100mg TID , Flexeril 10mg TID standing,   - Start hydromorphone 4 mg PO q8h PRN  - Eliquis 5mg BID    #HTN  #Orthostatic Hypotension  #HLD  - 10/1 TTE: EF 61%  - S/P 1L IVF bolus (10/13) for symptomatic hypotension with good effect  - Metoprolol to 25 BID  - Atorvastatin 40mg HS  - Losartan 100 mg daily  - Chlorthalidone 25 mg daily on hold  (restart if SBP is consistently above 140 per cardio)  - Midodrine 7.5 mg on hold    #Postoperative anemia  - S/p PRBCs intraoperatively for T spine fusion   - Hgb stable with no evidence of active bleed at this time   - 10/30- Hgb 8.6/ Hct 28.9, (10/31) 9.4/30.3    - Monitor H/H; transfuse for Hgb <7    #SILVANA (obstructive sleep apnea).   - Transient desaturations on RA    - Nocturnal 2L O2 and PRN during day during naps, monitor sats   - F/u with PCP outpatient as may benefit from sleep study and CPAP.    #Restless legs syndrome (RLS).   - Pramipexole 0.75mg daily     #Seasonal allergies.   - Montelukast 10mg daily    #Liver cyst.  -10/10 CT Abd/pelvis- There is a large cyst, stable since prior exam. There are small hypodense foci on segment II and segment I too small to characterize, unchanged since prior  - F/u outpatient     #Elevated serum alkaline phosphatase level.   - Hi serum alk phos levels 247 --> 228 --> 172 --> 233 --> 239--> 240--> 247--> 234---> 276 (10/31) 227  - Monitor serum alk phos levels.    #Gastritis.   - Vonoprazan 10mg daily  - Erythromycin 128mg oral fluid BID    #Mood/Cognition:  Neuropsychology consult PRN    #Sleep:   Maintain quiet hours and low stim environment.  Melatonin 6mg HS PRN to maximize participation in therapy during the day.     #GI/Bowel:  Senna QHS  Miralax PRN Daily    #/Bladder:   - PVR q 8 hours (SC if > 400)    #Skin/Pressure Injury:   - Skin assessment on admission: Midline sine dressing with drain in place, with minimal drainage. No pressure injuries.    #Diet/Supplement  Current Diet: Regular- Dash diet   -Ergocalciferol 49447T weekly   -Multivitamin daily     #Precautions / PROPHYLAXIS:   - Falls,  Spinal  - ortho: Weight bearing status: WBAT, TLSO brace OOB  - Lungs: Incentive Spirometer   - DVT ppx: SCDs, TEDs, Eliquis 5mg BID  - Pressure injury/Skin:  OOB to Chair, PT/OT      ---------------  Code Status: FULL  Emergency Contact:    Outpatient Follow-up (Specialty/Name of physician):    Fran Carr  Internal Medicine  [address], [address]  [place] [address]  Phone: [phone]  Fax: [phone]  Follow Up Time:     Kaushal Waterman  Infectious Disease  [address] [place] [address]  Phone: [phone]  Follow Up Time:  [facility] Neurosurgery  Neurosurgery  Referral Assistance Program    Guille Lance  [facility]  ORTHOSURG [address]  Scheduled Appointment: 11/22/2024    Catalino Cotto  [facility] Physician [facility]  WEIGHTMGMT  [facility]  Scheduled Appointment: 12/05/2024    Haydee Bernstein  [facility] Physician [facility]  GASTRO [address]  Scheduled Appointment: 01/06/2025      [facility] - Infectious Disease  Infectious Disease  [address], Infectious [place] [address]  Phone: [phone]    --------------

## 2024-11-09 PROCEDURE — 99232 SBSQ HOSP IP/OBS MODERATE 35: CPT

## 2024-11-09 PROCEDURE — 72133 CT LUMBAR SPINE W/O & W/DYE: CPT | Mod: 26

## 2024-11-09 PROCEDURE — 72130 CT CHEST SPINE W/O & W/DYE: CPT | Mod: 26

## 2024-11-09 RX ADMIN — PREGABALIN 100 MILLIGRAM(S): 75 CAPSULE ORAL at 13:46

## 2024-11-09 RX ADMIN — Medication 10 MILLIGRAM(S): at 13:46

## 2024-11-09 RX ADMIN — ACETAMINOPHEN 500MG 975 MILLIGRAM(S): 500 TABLET, COATED ORAL at 06:11

## 2024-11-09 RX ADMIN — Medication 2 TABLET(S): at 21:48

## 2024-11-09 RX ADMIN — Medication 10 MILLIGRAM(S): at 05:41

## 2024-11-09 RX ADMIN — PREGABALIN 100 MILLIGRAM(S): 75 CAPSULE ORAL at 05:41

## 2024-11-09 RX ADMIN — Medication 100 MILLIGRAM(S): at 21:48

## 2024-11-09 RX ADMIN — ACETAMINOPHEN 500MG 975 MILLIGRAM(S): 500 TABLET, COATED ORAL at 13:46

## 2024-11-09 RX ADMIN — Medication 40 MILLIGRAM(S): at 21:48

## 2024-11-09 RX ADMIN — DICLOFENAC SODIUM 2 GRAM(S): 20 SOLUTION TOPICAL at 05:43

## 2024-11-09 RX ADMIN — ACETAMINOPHEN 500MG 975 MILLIGRAM(S): 500 TABLET, COATED ORAL at 05:41

## 2024-11-09 RX ADMIN — DICLOFENAC SODIUM 2 GRAM(S): 20 SOLUTION TOPICAL at 21:48

## 2024-11-09 RX ADMIN — APIXABAN 5 MILLIGRAM(S): 2.5 TABLET, FILM COATED ORAL at 05:42

## 2024-11-09 RX ADMIN — MONTELUKAST SODIUM 10 MILLIGRAM(S): 10 TABLET ORAL at 12:27

## 2024-11-09 RX ADMIN — PREGABALIN 100 MILLIGRAM(S): 75 CAPSULE ORAL at 21:47

## 2024-11-09 RX ADMIN — Medication 100 MILLIGRAM(S): at 13:46

## 2024-11-09 RX ADMIN — METOPROLOL TARTRATE 50 MILLIGRAM(S): 100 TABLET, FILM COATED ORAL at 05:41

## 2024-11-09 RX ADMIN — Medication 1 TABLET(S): at 12:27

## 2024-11-09 RX ADMIN — Medication 25 MILLIGRAM(S): at 03:00

## 2024-11-09 RX ADMIN — ACETAMINOPHEN, DIPHENHYDRAMINE HCL, PHENYLEPHRINE HCL 6 MILLIGRAM(S): 325; 25; 5 TABLET ORAL at 21:47

## 2024-11-09 RX ADMIN — Medication 20 MILLIGRAM(S): at 12:27

## 2024-11-09 RX ADMIN — Medication 100 MILLIGRAM(S): at 05:39

## 2024-11-09 RX ADMIN — APIXABAN 5 MILLIGRAM(S): 2.5 TABLET, FILM COATED ORAL at 17:41

## 2024-11-09 RX ADMIN — CHLORHEXIDINE GLUCONATE 1 APPLICATION(S): 1.2 RINSE ORAL at 12:27

## 2024-11-09 RX ADMIN — METOPROLOL TARTRATE 50 MILLIGRAM(S): 100 TABLET, FILM COATED ORAL at 17:41

## 2024-11-09 RX ADMIN — PRAMIPEXOLE 0.75 MILLIGRAM(S): 1.5 TABLET, EXTENDED RELEASE ORAL at 15:25

## 2024-11-09 RX ADMIN — Medication 10 MILLIGRAM(S): at 22:01

## 2024-11-09 RX ADMIN — ACETAMINOPHEN 500MG 975 MILLIGRAM(S): 500 TABLET, COATED ORAL at 21:47

## 2024-11-09 RX ADMIN — ACETAMINOPHEN 500MG 975 MILLIGRAM(S): 500 TABLET, COATED ORAL at 22:40

## 2024-11-09 NOTE — PROGRESS NOTE ADULT - ASSESSMENT
75 y/o F with PMHx of OA, HTN, gastroparesis, peripheral neuropathy, multiple prior thoracolumbar spine surgeries for congenital scoliosis [place] >[place] of [place] w/ removal of hardware (1991) and recent fall with T11-T12 fracture, s/p thoracolumbar posterior fusion for t spine fx with plastics closure on  9/29 with Dr. Stroud and Dr. Funk, c/b post op csf leak, new onset paroxysmal Afib with RVR, increased post op pain and drainage from incision site, pleural effusions, symptomatic orthostatic hypotension, urinary retention, post op anemia, and CP with spontaneous resolution. Patient was sent to [facility] rehab on 10/17, c/b AMS, sepsis with high fever, rigors, tachycardia on 10/20 found to have MSSA bacteremia with right upper lobe infiltrate, and UTI. CT spine no report of collection. Urine +ve E coli, transferred back to [facility] on 10/23 for MICHAEL, advanced spine imaging, and further management, found to have paraspinal abscess on MR imaging, S/p paraspinal collection aspiration on 10/25, culture growing gram positive cocci in pairs, with rare staph aureus. Neurosurgery advised no acute surgical revision necessary, repeat BCx negative. To continue long-term IV cefazolin via PICC until 12/12/24 (6 wks) with eventual transition to PO Duricef long term/indefinitely as per ID. MICHAEL was negative for valvular vegetations but did reveal incidental PFO. Hospital course significant for recurrent fevers, A fib, started AC. Now at [facility] for rehab.     MSSA Bacteremia   - s/p paraspinal collection aspiration 10/25 with Dr. Guerra  - continue Cefazolin 2g q8h via PICC until 12/12, then start Duricef 500mg bid indefinitely   - RADHA drain dislodged overnight, ordered CT abd/pelvis w/ and w/o contrast     Back Pain  - S/P T9-L3 open fusion with PSO/lag screw partial corpectomy/interbody with complex plastic closure (w/ Dr. Stroud and Dr. Tinajero on 9/29  - TLSO when OOB  - PT/OT per PMR  - Pain management per PMR    Hypertension  Orthostasis   - monitor vitals  - chlorthalidone on hold  - discontinued losartan 100mg (11/2-)  - on metoprolol 50mg bid for rate control with holding parameters. can increase if needed for Bp optimization and rate control    A-fib  - continue Eliquis AC   - lopressor 50mg bid    Anemia   - monitor     DVT Prophylaxis:  - Eliquis

## 2024-11-09 NOTE — PROGRESS NOTE ADULT - SUBJECTIVE AND OBJECTIVE BOX
CC: Patient is a 74y old  Female who presents with a chief complaint of Traumatic T9-L3 vertebral fracture s/p corpectomy and fusion surgery c/b MSSA Bacteremia 2/2 paraspinal abscess (08 Nov 2024 13:27)      Interval History:  Patient seen and examined at bedside.  No overnight events  No complaints this morning  Reports left flank pain   Drain became dislodged overnight   Pt also reports restless legs     ALLERGIES:  Dilantin (Urticaria)  penicillins (Urticaria)  erythromycin (Stomach Upset; Vomiting; Nausea)    MEDICATIONS  (STANDING):  acetaminophen     Tablet .. 975 milliGRAM(s) Oral every 8 hours  apixaban 5 milliGRAM(s) Oral every 12 hours  atorvastatin 40 milliGRAM(s) Oral at bedtime  ceFAZolin   IVPB 2000 milliGRAM(s) IV Intermittent every 8 hours  chlorhexidine 2% Cloths 1 Application(s) Topical daily  cyclobenzaprine 10 milliGRAM(s) Oral three times a day  diclofenac sodium 1% Gel 2 Gram(s) Topical two times a day  DULoxetine 20 milliGRAM(s) Oral daily  ergocalciferol 18812 Unit(s) Oral <User Schedule>  erythromycin    ethylsuccinate Suspension 40 mG/mL 128 milliGRAM(s) Oral every 12 hours  metoprolol tartrate 50 milliGRAM(s) Oral two times a day  montelukast 10 milliGRAM(s) Oral daily  multivitamin 1 Tablet(s) Oral daily  polyethylene glycol 3350 17 Gram(s) Oral two times a day  pramipexole 0.75 milliGRAM(s) Oral <User Schedule>  pregabalin 100 milliGRAM(s) Oral every 8 hours  senna 2 Tablet(s) Oral at bedtime  Vonoprazan (Voquezna) 10mg 1 Tablet(s),Vonoprazan (Voquezna) 10mg 1 Tablet(s) 1 Tablet(s) Oral <User Schedule>    MEDICATIONS  (PRN):  benzocaine/menthol Lozenge 1 Lozenge Oral every 3 hours PRN Mouth Sores  diphenhydrAMINE 25 milliGRAM(s) Oral two times a day PRN Rash and/or Itching  HYDROmorphone   Tablet 4 milliGRAM(s) Oral every 8 hours PRN Severe Pain (7 - 10)  melatonin 6 milliGRAM(s) Oral at bedtime PRN Insomnia    Vital Signs Last 24 Hrs  T(F): 98.4 (09 Nov 2024 08:08), Max: 98.6 (08 Nov 2024 20:07)  HR: 86 (09 Nov 2024 08:08) (85 - 96)  BP: 135/82 (09 Nov 2024 08:08) (132/77 - 144/78)  RR: 19 (09 Nov 2024 08:08) (16 - 19)  SpO2: 97% (09 Nov 2024 08:08) (95% - 97%)  I&O's Summary    08 Nov 2024 07:01  -  09 Nov 2024 07:00  --------------------------------------------------------  IN: 0 mL / OUT: 6.5 mL / NET: -6.5 mL          Review of Systems:   Constitutional: negative for fatigue, negative for fever, negative for chills, negative for decreased appetite.  Skin: negative for rashes, negative for open wounds, negative for jaundice.   Eyes: negative for blurry vision, negative for double vision.   Ears, nose, throat: negative for ear pain, negative for nasal congestion, negative for sore throat, negative for lymph node swelling.   Cardiovascular: negative for chest pain, negative for palpitations, negative for lower extremity swelling.   Respiratory: negative for shortness of breath, negative for wheezing, negative for cough.   Gastrointestinal: positive for abdominal pain, negative for nausea, negative for vomiting, negative for diarrhea, negative for constipation, negative for blood in the stool, negative for black tarry stools.   Genitourinary: negative for burning on urination, negative for urinary urgency or frequency, negative for blood in the urine.   Endocrine: negative for cold intolerance, negative for heat intolerance, negative for increased thirst.   Hematologic: negative for easy bruising or bleeding.   Musculoskeletal: negative for muscle/joint pain, negative for decreased range of motion.   Neurological: negative for dizziness, negative for headaches, negative for loss of consciousness, negative for motor weakness, negative for sensory deficits.   Psychiatric: negative for depression, negative for anxiety.     PHYSICAL EXAM:  General: Awake and alert, cooperative with exam. No acute distress.   Skin: Warm, dry, and pink.   Eyes: Pupils equal and reactive to light. Extraocular eye movements intact. No conjunctival injection, discharge, or scleral icterus.   HEENT: Atraumatic, normocephalic. Moist mucus membranes.   Cardiology: Normal S1, S2. No murmurs, rubs, or gallops. Regular rate and rhythm.   Respiratory: Lungs clear to ascultation bilaterally. Good air exchange. No wheezes, rales, or rhonchi. Normal chest expansion.   Gastrointestinal: Positive bowel sounds. Soft, non-tender, non-distended. No guarding, rigidity, or rebound tenderness. No hepatosplenomegaly. Left flank tender on palpation.   Back: Incision is clean/dry/intact.   Musculoskeletal: 5/5 motor strength in all extremities. Normal range of motion.   Extremities: No peripheral edema bilaterally. Dorsalis pedis pulses 2+ bilaterally.   Neurological: A+Ox3 (person, place, and time). Cranial nerves 2-12 intact. Normal speech. No facial droop. No focal neurological deficits.   Psychiatric: Normal affect. Normal mood.     LABS:                        9.7    5.25  )-----------( 483      ( 07 Nov 2024 06:31 )             32.5       11-07    139  |  103  |  18  ----------------------------<  113  4.1   |  29  |  0.90    Ca    9.5      07 Nov 2024 06:31    TPro  7.6  /  Alb  3.1  /  TBili  0.4  /  DBili  x   /  AST  20  /  ALT  19  /  AlkPhos  197  11-07                                  Urinalysis Basic - ( 07 Nov 2024 06:31 )    Color: x / Appearance: x / SG: x / pH: x  Gluc: 113 mg/dL / Ketone: x  / Bili: x / Urobili: x   Blood: x / Protein: x / Nitrite: x   Leuk Esterase: x / RBC: x / WBC x   Sq Epi: x / Non Sq Epi: x / Bacteria: x        COVID-19 PCR: NotDetec (10-30-24 @ 21:45)  COVID-19 PCR: NotDetec (10-21-24 @ 09:20)  COVID-19 PCR: NotDetec (10-17-24 @ 16:34)      Care Discussed with Consultants/Other Providers: Yes

## 2024-11-09 NOTE — PROGRESS NOTE ADULT - ASSESSMENT
Assessment/Plan:  Mrs. Haley French is a 74-year-old female patient with past medical history of OA, HTN, gastroparesis, peripheral neuropathy, multiple prior thoracolumbar spine surgeries for congenital scoliosis [place] w/ removal of hardware (1991) who is admitted for Acute Inpatient Rehabilitation with a multidisciplinary rehab program at [facility] with functional impairments in ADLs and mobility secondary to mechanical fall reporting a T12 three column spine fracture-malalignment treated surgically with a T9-L3 open fusion with PSO/lag screw partial corpectomy/interbody by Dr. Stroud, Neurosurgeon with complex Plastic Surgery closure by Dr. Elias on 9/29/24 subsequently complicated with MSSA bacteremia secondary to a paraspinal abscess s/p paraspinal collection aspiration on 10/25/24.     #Traumatic T9-L3 vertebral fracture s/p corpectomy and fusion surgery c/b MSSA Bacteremia 2/2 paraspinal abscess  - Paraspinal abscess with Sepsis due to methicillin susceptible Staphylococcus aureus.       * 10/20 Bcx MSSA, 10/21 no growth at 24hrs      * CXR with old rib fracture and small RUL infiltrate      * 10/20 +UA for Ecoli, follow urine culture      * Lactic acidosis resolved, lactate 1.6 10/23      * 10/23 Na 134 K 3.3 Phos 1.5      * MRI: rim-enhancing heterogeneous fluid collection suspicious for an abscess; culture growing GPC in pairs and rare staph aureus       * TTE: no evidence of vegetations      * No Acute surgical intervention per neurosurgery       * S/P paraspinal collection aspiration with paraspinal abscess drain placed with Dr. Guerra 10/25      * Repeat daily Bcx- negative      * Monitor WBC and fever curve      * S/P PICC insertion 10/28      * TLSO when OOB      * Cefazolin 2000mg IV q8 until 12/12/24 via PICC- until 12/12/24-> Start PO Duricef 500mg BID on 12/13/24 - long term/indefinitely   - Weekly CBC Diff BMP ESR CRP; emailed to [email or web address]  - (10/31) CRP 56, ESR 95   - Activity Limitations: Decreased social, vocational and leisure activities, decreased self care and ADLs, decreased mobility, decreased ability to manage household and finances.   - Comprehensive Multidisciplinary Rehab Program:      * 3 hours a day, 5 days a week.      * PT 2hr/day: Focused on improving strength, endurance, coordination, balance, functional mobility, and transfers      * OT 1hr/day: Focused on improving strength, fine motor skills, coordination, posture and ADLs.      -----------------------------------------------------------------------------------  Concurrent Medical Problems    #Paroxysmal atrial fibrillation.   - 10/25 ECG and exam notable for irregular rate and rhythm  - Metoprolol to 25 BID for rate control  - S/p heparin gtt 3500mg IV q6 for anticoagulation  - EKG - tachy to 110s this am in the setting of pain    #Acute on chronic low back pain.   - S/P T9-L3 open fusion with PSO/lag screw partial corpectomy/interbody with complex plastic closure (w/ Dr. Stroud and Dr. Tinajero on 9/29  - TLSO when OOB  - Pain management: Tylenol 975mg ATC TID,  Duloxetine 20mg daily, Lyrica 100mg TID , Flexeril 10mg TID standing,   - Start hydromorphone 4 mg PO q8h PRN  - Eliquis 5mg BID    #HTN  #Orthostatic Hypotension  #HLD  - 10/1 TTE: EF 61%  - S/P 1L IVF bolus (10/13) for symptomatic hypotension with good effect  - Metoprolol to 25 BID  - Atorvastatin 40mg HS  - Losartan 100 mg daily  - Chlorthalidone 25 mg daily on hold  (restart if SBP is consistently above 140 per cardio)  - Midodrine 7.5 mg on hold    #Postoperative anemia  - S/p PRBCs intraoperatively for T spine fusion   - Hgb stable with no evidence of active bleed at this time   - 10/30- Hgb 8.6/ Hct 28.9, (10/31) 9.4/30.3    - Monitor H/H; transfuse for Hgb <7    #SILVANA (obstructive sleep apnea).   - Transient desaturations on RA    - Nocturnal 2L O2 and PRN during day during naps, monitor sats   - F/u with PCP outpatient as may benefit from sleep study and CPAP.    #Restless legs syndrome (RLS).   - Pramipexole 0.75mg daily     #Seasonal allergies.   - Montelukast 10mg daily    #Liver cyst.  -10/10 CT Abd/pelvis- There is a large cyst, stable since prior exam. There are small hypodense foci on segment II and segment I too small to characterize, unchanged since prior  - F/u outpatient     #Elevated serum alkaline phosphatase level.   - Hi serum alk phos levels 247 --> 228 --> 172 --> 233 --> 239--> 240--> 247--> 234---> 276 (10/31) 227  - Monitor serum alk phos levels.    #Gastritis.   - Vonoprazan 10mg daily  - Erythromycin 128mg oral fluid BID    #Mood/Cognition:  Neuropsychology consult PRN    #Sleep:   Maintain quiet hours and low stim environment.  Melatonin 6mg HS PRN to maximize participation in therapy during the day.     #GI/Bowel:  Senna QHS  Miralax PRN Daily    #/Bladder:   - PVR q 8 hours (SC if > 400)    #Skin/Pressure Injury:   - Skin assessment on admission: Midline sine dressing with drain in place, with minimal drainage. No pressure injuries.    #Diet/Supplement  Current Diet: Regular- Dash diet   -Ergocalciferol 49141F weekly   -Multivitamin daily     #Precautions / PROPHYLAXIS:   - Falls,  Spinal  - ortho: Weight bearing status: WBAT, TLSO brace OOB  - Lungs: Incentive Spirometer   - DVT ppx: SCDs, TEDs, Eliquis 5mg BID  - Pressure injury/Skin:  OOB to Chair, PT/OT      ---------------  Code Status: FULL  Emergency Contact:    Outpatient Follow-up (Specialty/Name of physician):    Fran Carr  Internal Medicine  [address], [address]  [place] [address]  Phone: [phone]  Fax: [phone]  Follow Up Time:     Kaushal Waterman  Infectious Disease  [address] [place] [address]  Phone: [phone]  Follow Up Time:  [facility] Neurosurgery  Neurosurgery  Referral Assistance Program    Guille Lance  [facility]  ORTHOSURG [address]  Scheduled Appointment: 11/22/2024    Catalino Cotto  [facility] Physician [facility]  WEIGHTMGMT  [facility]  Scheduled Appointment: 12/05/2024    Haydee Bernstein  [facility] Physician [facility]  GASTRO [address]  Scheduled Appointment: 01/06/2025      [facility] - Infectious Disease  Infectious Disease  [address], [place] [address]  Phone: [phone]    --------------

## 2024-11-09 NOTE — PROGRESS NOTE ADULT - SUBJECTIVE AND OBJECTIVE BOX
HPI:  Mrs. Haley French is a 74-year-old female patient with past medical history of OA, HTN, gastroparesis, peripheral neuropathy, multiple prior thoracolumbar spine surgeries for congenital scoliosis [place] >[place] of [place] w/ removal of hardware (1991) who presented to [facility] on 9/28/24 with back pain and left hip pain s/p mechanical fall. CT imaging was performed and reported a T12 three column spine fracture-malalignment with fracture extending to the adjacent right 12th posterior rib and left T12-L1 facet joint and acute, displaced fracture of the left eighth lateral rib. Chronic appearing bilateral rib deformities. Surgical management was recommended and she is S/P T9-L3 open fusion with PSO/lag screw partial corpectomy/interbody by Dr. Stroud, Neurosurgeon with complex Plastic Surgery closure by Dr. Elias on 9/29/24. Post op course c/b CSK leak s/p repair, new onset paroxsymal Afib with RVR,  increased pain and drainage from surgical incision, development of pleural effusions, symptomatic orthostatic hypotension, urinary retention, post op anemia, and chest pain with spontaneous resolution. Cardiac work up negative. Patient was evaluated by PM&R and therapy for functional deficits, gait/ADL impairments and acute rehabilitation was recommended. Patient was cleared for discharge to [facility] IRF on 10/16/24.    Her admission was remarkable for a developing a fever of 103 with tachycardia and subjective fever/chills. Sepsis w/up was initiated and found to have small right upper lobe infiltrate, UTI, and MSSA bacteremia. CTH negative. Hospitalist and ID were consulted and per G+ bacteremia guidelines was recommended completing infectious workup, which includes MICHAEL which is not available at [facility]. Patient also required PICC line placement for long term ABx and a transfer request initiated from [place] to [facility] for MICHAEL and completion of bacteremia workup/treatment. All other medical co-morbidities were stable. Patient was deemed medically stable for discharge to [facility] medicine on 10/21/24 and was subsequently transferred on 10/23/24 for MICHAEL, advanced spine imaging, and further management. She was found to have a paraspinal abscess on MR imaging and is s/p paraspinal collection aspiration on 10/25/24. Culture grew gram positive cocci in pairs, with rare staph aureus. Neurosurgery advised and no acute surgical revision was deemed necessary. Repeat BCx was negative. Recommendation to continue long-term IV cefazolin via PICC until 12/12/24 (6 wks) with eventual transition to PO Duricef long term/indefinitely per ID. MICHAEL was negative for valvular vegetations but did reveal incidental PFO. Hospital course was significant for recurrent fevers, A fib (resolved) with source control and electrolyte correction, initiated on DOAc given elevated [id number]. Patient was evaluated by PM&R and therapy for functional deficits, gait/ADL impairments and acute rehabilitation was recommended. Patient was cleared for discharge to [facility] IRF on 10/30/24.   (30 Oct 2024 13:05)    TDD: 11/20/24 to Home  ___________________________________________________________________________    SUBJECTIVE/ROS  Patient was seen and evaluated at bedside today.  Reported no overnight events and is in no acute distress.  Drain dislodged overnight. Neurosurgeon contacted and CT imaging ordered earlier today.  She remains clinically stable and in no distress.  CT imaging ordered to assess for any retained element.  A lengthy discussion took place with patient and his son.  All questions were answered and they expressed understanding and agreement.   Patient is eager to continue participation on the recommended rehabilitation program.  Denies any CP, SOB, POLLARD, palpitations, fever, chills, body aches, cough, congestion, or any other symptoms at this time.   ___________________________________________________________________________    Vital Signs Last 24 Hrs  T(C): 36.9 (09 Nov 2024 08:08), Max: 37 (08 Nov 2024 20:07)  T(F): 98.4 (09 Nov 2024 08:08), Max: 98.6 (08 Nov 2024 20:07)  HR: 86 (09 Nov 2024 08:08) (85 - 96)  BP: 135/82 (09 Nov 2024 08:08) (132/77 - 144/78)  RR: 19 (09 Nov 2024 08:08) (16 - 19)  SpO2: 97% (09 Nov 2024 08:08) (95% - 97%)    ___________________________________________________________________________    LAB                        9.7    5.25  )-----------( 483      ( 07 Nov 2024 06:31 )             32.5     11-07    139  |  103  |  18  ----------------------------<  113[H]  4.1   |  29  |  0.90    Ca    9.5      07 Nov 2024 06:31    TPro  7.6  /  Alb  3.1[L]  /  TBili  0.4  /  DBili  x   /  AST  20  /  ALT  19  /  AlkPhos  197[H]  11-07    LIVER FUNCTIONS - ( 07 Nov 2024 06:31 )  Alb: 3.1 g/dL / Pro: 7.6 g/dL / ALK PHOS: 197 U/L / ALT: 19 U/L / AST: 20 U/L / GGT: x             ESR Historical Values  Sedimentation Rate, Erythrocyte (11.04.24 @ 06:25)   Sedimentation Rate, Erythrocyte: 90 mm/hr  Sedimentation Rate, Erythrocyte (10.31.24 @ 06:00)   Sedimentation Rate, Erythrocyte: 95 mm/hr    CRP Historical Values  C-Reactive Protein (11.04.24 @ 06:25)   C-Reactive Protein: 50 mg/L  C-Reactive Protein (10.31.24 @ 06:00)   C-Reactive Protein: 56 mg/L  C-Reactive Protein (10.21.24 @ 06:20)   C-Reactive Protein: 183 mg/L    ___________________________________________________________________________    MEDICATIONS  (STANDING):  acetaminophen     Tablet .. 975 milliGRAM(s) Oral every 8 hours  apixaban 5 milliGRAM(s) Oral every 12 hours  atorvastatin 40 milliGRAM(s) Oral at bedtime  ceFAZolin   IVPB 2000 milliGRAM(s) IV Intermittent every 8 hours  chlorhexidine 2% Cloths 1 Application(s) Topical daily  cyclobenzaprine 10 milliGRAM(s) Oral three times a day  diclofenac sodium 1% Gel 2 Gram(s) Topical two times a day  DULoxetine 20 milliGRAM(s) Oral daily  ergocalciferol 01783 Unit(s) Oral <User Schedule>  erythromycin    ethylsuccinate Suspension 40 mG/mL 128 milliGRAM(s) Oral every 12 hours  metoprolol tartrate 50 milliGRAM(s) Oral two times a day  montelukast 10 milliGRAM(s) Oral daily  multivitamin 1 Tablet(s) Oral daily  polyethylene glycol 3350 17 Gram(s) Oral two times a day  pramipexole 0.75 milliGRAM(s) Oral <User Schedule>  pregabalin 100 milliGRAM(s) Oral every 8 hours  senna 2 Tablet(s) Oral at bedtime  Vonoprazan (Voquezna) 10mg 1 Tablet(s),Vonoprazan (Voquezna) 10mg 1 Tablet(s) 1 Tablet(s) Oral <User Schedule>    MEDICATIONS  (PRN):  benzocaine/menthol Lozenge 1 Lozenge Oral every 3 hours PRN Mouth Sores  diphenhydrAMINE 25 milliGRAM(s) Oral two times a day PRN Rash and/or Itching  HYDROmorphone   Tablet 4 milliGRAM(s) Oral every 8 hours PRN Severe Pain (7 - 10)  melatonin 6 milliGRAM(s) Oral at bedtime PRN Insomnia    ___________________________________________________________________________    PHYSICAL EXAM:    Gen - NAD, Comfortable  HEENT - NCAT, EOMI, PERRLA  Pulm - CTAB, No wheeze, No rhonchi, No crackles  Cardiovascular - RRR, S1S2, No murmurs  Abdomen - Soft, mildly tender in RLQ, (+) BS throughout  Extremities - No C/C/E, No calf tenderness B/L  Neuro-     Cognitive - AAOx3, follows command     Motor -                     LEFT    UE - ShAB 4/5, EF 5/5, EE 5/5, WE 5/5,  5/5                    RIGHT UE - ShAB 4/5, EF 5/5, EE 5/5, WE 5/5,  5/5                    LEFT    LE - HF 4/5, KE 5/5, DF 5/5, PF 5/5                    RIGHT LE - HF 4/5, KE 5/5, DF 5/5, PF 5/5        Sensory - Intact to LT in UEs and LEs B/L   Psychiatric - Mood stable, Affect WNL  Skin: Midline dressing with drain exiting spine in place with minimal to no collection in bag. Clean dry and skin intact without erythema or breakdown. 2, 1-2cm  small incision sites, 1 to L and 1 to R of Thoracic spine at site of prior drains. Healing well with skin approximated and no drainage or signs of infection.    ___________________________________________________________________________

## 2024-11-09 NOTE — PROVIDER CONTACT NOTE (OTHER) - ACTION/TREATMENT ORDERED:
Covered site with dry sterile dressing and tegaderm. No further interventions ordered by MD at this time.
Continue to monitor.

## 2024-11-09 NOTE — PROVIDER CONTACT NOTE (OTHER) - SITUATION
Pt refused erythromycin ethylsuccinate and c/o mouth sore
Patients pigtail drain located on their back dislodged.

## 2024-11-09 NOTE — PROVIDER CONTACT NOTE (OTHER) - ASSESSMENT
Pt refused erythromycin ethylsuccinate for gastroparesis and c/o mouth sore after having pineapple
Patient's drain site had a scant amount of serous anginous drainage.

## 2024-11-10 PROCEDURE — 99232 SBSQ HOSP IP/OBS MODERATE 35: CPT

## 2024-11-10 RX ADMIN — Medication 100 MILLIGRAM(S): at 05:19

## 2024-11-10 RX ADMIN — PREGABALIN 100 MILLIGRAM(S): 75 CAPSULE ORAL at 13:02

## 2024-11-10 RX ADMIN — APIXABAN 5 MILLIGRAM(S): 2.5 TABLET, FILM COATED ORAL at 05:18

## 2024-11-10 RX ADMIN — METOPROLOL TARTRATE 50 MILLIGRAM(S): 100 TABLET, FILM COATED ORAL at 17:10

## 2024-11-10 RX ADMIN — APIXABAN 5 MILLIGRAM(S): 2.5 TABLET, FILM COATED ORAL at 17:09

## 2024-11-10 RX ADMIN — DICLOFENAC SODIUM 2 GRAM(S): 20 SOLUTION TOPICAL at 17:10

## 2024-11-10 RX ADMIN — Medication 100 MILLIGRAM(S): at 22:42

## 2024-11-10 RX ADMIN — ACETAMINOPHEN 500MG 975 MILLIGRAM(S): 500 TABLET, COATED ORAL at 14:00

## 2024-11-10 RX ADMIN — Medication 1 TABLET(S): at 12:52

## 2024-11-10 RX ADMIN — ACETAMINOPHEN 500MG 975 MILLIGRAM(S): 500 TABLET, COATED ORAL at 05:18

## 2024-11-10 RX ADMIN — Medication 10 MILLIGRAM(S): at 13:02

## 2024-11-10 RX ADMIN — DICLOFENAC SODIUM 2 GRAM(S): 20 SOLUTION TOPICAL at 05:19

## 2024-11-10 RX ADMIN — ACETAMINOPHEN 500MG 975 MILLIGRAM(S): 500 TABLET, COATED ORAL at 13:02

## 2024-11-10 RX ADMIN — Medication 10 MILLIGRAM(S): at 05:18

## 2024-11-10 RX ADMIN — ACETAMINOPHEN 500MG 975 MILLIGRAM(S): 500 TABLET, COATED ORAL at 06:18

## 2024-11-10 RX ADMIN — METOPROLOL TARTRATE 50 MILLIGRAM(S): 100 TABLET, FILM COATED ORAL at 05:19

## 2024-11-10 RX ADMIN — PREGABALIN 100 MILLIGRAM(S): 75 CAPSULE ORAL at 22:42

## 2024-11-10 RX ADMIN — MONTELUKAST SODIUM 10 MILLIGRAM(S): 10 TABLET ORAL at 12:52

## 2024-11-10 RX ADMIN — PRAMIPEXOLE 0.75 MILLIGRAM(S): 1.5 TABLET, EXTENDED RELEASE ORAL at 17:09

## 2024-11-10 RX ADMIN — PREGABALIN 100 MILLIGRAM(S): 75 CAPSULE ORAL at 05:18

## 2024-11-10 RX ADMIN — Medication 40 MILLIGRAM(S): at 22:42

## 2024-11-10 RX ADMIN — Medication 20 MILLIGRAM(S): at 12:52

## 2024-11-10 RX ADMIN — Medication 10 MILLIGRAM(S): at 22:42

## 2024-11-10 RX ADMIN — Medication 2 TABLET(S): at 22:42

## 2024-11-10 RX ADMIN — Medication 100 MILLIGRAM(S): at 13:01

## 2024-11-10 RX ADMIN — ACETAMINOPHEN 500MG 975 MILLIGRAM(S): 500 TABLET, COATED ORAL at 22:42

## 2024-11-10 NOTE — PROGRESS NOTE ADULT - SUBJECTIVE AND OBJECTIVE BOX
HPI:  Mrs. Haley French is a 74-year-old female patient with past medical history of OA, HTN, gastroparesis, peripheral neuropathy, multiple prior thoracolumbar spine surgeries for congenital scoliosis done >[place] ago [place] of [place] w/ removal of hardware (1991) who presented to [facility] on 9/28/24 with back pain and left hip pain s/p mechanical fall. CT imaging was performed and reported a T12 three column spine fracture-malalignment with fracture extending to the adjacent right 12th posterior rib and left T12-L1 facet joint and acute, displaced fracture of the left eighth lateral rib. Chronic appearing bilateral rib deformities. Surgical management was recommended and she is S/P T9-L3 open fusion with PSO/lag screw partial corpectomy/interbody by Dr. Stroud, Neurosurgeon with complex Plastic Surgery closure by Dr. Elias on 9/29/24. Post op course c/b CSK leak s/p repair, new onset paroxsymal Afib with RVR,  increased pain and drainage from surgical incision, development of pleural effusions, symptomatic orthostatic hypotension, urinary retention, post op anemia, and chest pain with spontaneous resolution. Cardiac work up negative. Patient was evaluated by PM&R and therapy for functional deficits, gait/ADL impairments and acute rehabilitation was recommended. Patient was cleared for discharge to [facility] IRF on 10/16/24.    Her admission was remarkable for a developing a fever of 103 with tachycardia and subjective fever/chills. Sepsis w/up was initiated and found to have small right upper lobe infiltrate, UTI, and MSSA bacteremia. CTH negative. Hospitalist and ID were consulted and per G+ bacteremia guidelines was recommended completing infectious workup, which includes MICHAEL which is not available at [facility]. Patient also required PICC line placement for long term ABx and a transfer request initiated from [place] to [facility] for MICHALE and completion of bacteremia workup/treatment. All other medical co-morbidities were stable. Patient was deemed medically stable for discharge to [facility] medicine on 10/21/24 and was subsequently transferred on 10/23/24 for MICHAEL, advanced spine imaging, and further management. She was found to have a paraspinal abscess on MR imaging and is s/p paraspinal collection aspiration on 10/25/24. Culture grew gram positive cocci in pairs, with rare staph aureus. Neurosurgery advised and no acute surgical revision was deemed necessary. Repeat BCx was negative. Recommendation to continue long-term IV cefazolin via PICC until 12/12/24 (6 wks) with eventual transition to PO Duricef long term/indefinitely per ID. MICHAEL was negative for valvular vegetations but did reveal incidental PFO. Hospital course was significant for recurrent fevers, A fib (resolved) with source control and electrolyte correction, initiated on DOAc given elevated [id number]. Patient was evaluated by PM&R and therapy for functional deficits, gait/ADL impairments and acute rehabilitation was recommended. Patient was cleared for discharge to [facility] IRF on 10/30/24.   (30 Oct 2024 13:05)    TDD: 11/20/24 to Home  ___________________________________________________________________________    SUBJECTIVE/ROS  Patient was seen and evaluated at bedside today.  Reported no overnight events and is in no acute distress.  Awaiting CT imaging results.  A discussion took place with patient and his son at bedside.  All questions were answered and they expressed understanding and agreement.   Patient is eager to continue participation on the recommended rehabilitation program.  Denies any CP, SOB, POLLARD, palpitations, fever, chills, body aches, cough, congestion, or any other symptoms at this time.   ___________________________________________________________________________    Vital Signs Last 24 Hrs  T(C): 36.6 (10 Nov 2024 08:44), Max: 36.6 (10 Nov 2024 08:44)  T(F): 97.9 (10 Nov 2024 08:44), Max: 97.9 (10 Nov 2024 08:44)  HR: 73 (10 Nov 2024 08:44) (73 - 73)  BP: 126/71 (10 Nov 2024 08:44) (126/71 - 126/71)  RR: 16 (10 Nov 2024 08:44) (16 - 16)  SpO2: 96% (10 Nov 2024 08:44) (96% - 96%)    ___________________________________________________________________________    LAB                          9.7    5.25  )-----------( 483      ( 07 Nov 2024 06:31 )             32.5     11-07    139  |  103  |  18  ----------------------------<  113[H]  4.1   |  29  |  0.90    Ca    9.5      07 Nov 2024 06:31    TPro  7.6  /  Alb  3.1[L]  /  TBili  0.4  /  DBili  x   /  AST  20  /  ALT  19  /  AlkPhos  197[H]  11-07    LIVER FUNCTIONS - ( 07 Nov 2024 06:31 )  Alb: 3.1 g/dL / Pro: 7.6 g/dL / ALK PHOS: 197 U/L / ALT: 19 U/L / AST: 20 U/L / GGT: x             ESR Historical Values  Sedimentation Rate, Erythrocyte (11.04.24 @ 06:25)   Sedimentation Rate, Erythrocyte: 90 mm/hr  Sedimentation Rate, Erythrocyte (10.31.24 @ 06:00)   Sedimentation Rate, Erythrocyte: 95 mm/hr    CRP Historical Values  C-Reactive Protein (11.04.24 @ 06:25)   C-Reactive Protein: 50 mg/L  C-Reactive Protein (10.31.24 @ 06:00)   C-Reactive Protein: 56 mg/L  C-Reactive Protein (10.21.24 @ 06:20)   C-Reactive Protein: 183 mg/L    ___________________________________________________________________________    MEDICATIONS  (STANDING):  acetaminophen     Tablet .. 975 milliGRAM(s) Oral every 8 hours  apixaban 5 milliGRAM(s) Oral every 12 hours  atorvastatin 40 milliGRAM(s) Oral at bedtime  ceFAZolin   IVPB 2000 milliGRAM(s) IV Intermittent every 8 hours  chlorhexidine 2% Cloths 1 Application(s) Topical daily  cyclobenzaprine 10 milliGRAM(s) Oral three times a day  diclofenac sodium 1% Gel 2 Gram(s) Topical two times a day  DULoxetine 20 milliGRAM(s) Oral daily  ergocalciferol 06735 Unit(s) Oral <User Schedule>  erythromycin    ethylsuccinate Suspension 40 mG/mL 128 milliGRAM(s) Oral every 12 hours  metoprolol tartrate 50 milliGRAM(s) Oral two times a day  montelukast 10 milliGRAM(s) Oral daily  multivitamin 1 Tablet(s) Oral daily  polyethylene glycol 3350 17 Gram(s) Oral two times a day  pramipexole 0.75 milliGRAM(s) Oral <User Schedule>  pregabalin 100 milliGRAM(s) Oral every 8 hours  senna 2 Tablet(s) Oral at bedtime  Vonoprazan (Voquezna) 10mg 1 Tablet(s),Vonoprazan (Voquezna) 10mg 1 Tablet(s) 1 Tablet(s) Oral <User Schedule>    MEDICATIONS  (PRN):  benzocaine/menthol Lozenge 1 Lozenge Oral every 3 hours PRN Mouth Sores  diphenhydrAMINE 25 milliGRAM(s) Oral two times a day PRN Rash and/or Itching  HYDROmorphone   Tablet 4 milliGRAM(s) Oral every 8 hours PRN Severe Pain (7 - 10)  melatonin 6 milliGRAM(s) Oral at bedtime PRN Insomnia    ___________________________________________________________________________    PHYSICAL EXAM:    Gen - NAD, Comfortable  HEENT - NCAT, EOMI, PERRLA  Pulm - CTAB, No wheeze, No rhonchi, No crackles  Cardiovascular - RRR, S1S2, No murmurs  Abdomen - Soft, mildly tender in RLQ, (+) BS throughout  Extremities - No C/C/E, No calf tenderness B/L  Neuro-     Cognitive - AAOx3, follows command     Motor -                     LEFT    UE - ShAB 4/5, EF 5/5, EE 5/5, WE 5/5,  5/5                    RIGHT UE - ShAB 4/5, EF 5/5, EE 5/5, WE 5/5,  5/5                    LEFT    LE - HF 4/5, KE 5/5, DF 5/5, PF 5/5                    RIGHT LE - HF 4/5, KE 5/5, DF 5/5, PF 5/5        Sensory - Intact to LT in UEs and LEs B/L   Psychiatric - Mood stable, Affect WNL  Skin: Midline dressing with drain exiting spine in place with minimal to no collection in bag. Clean dry and skin intact without erythema or breakdown. 2, 1-2cm  small incision sites, 1 to L and 1 to R of Thoracic spine at site of prior drains. Healing well with skin approximated and no drainage or signs of infection.    ___________________________________________________________________________

## 2024-11-10 NOTE — PROGRESS NOTE ADULT - ASSESSMENT
75 y/o F with PMHx of OA, HTN, gastroparesis, peripheral neuropathy, multiple prior thoracolumbar spine surgeries for congenital scoliosis done >[place] of [place] w/ removal of hardware (1991) and recent fall with T11-T12 fracture, s/p thoracolumbar posterior fusion for t spine fx with plastics closure on  9/29 with Dr. Stroud and Dr. Funk, c/b post op csf leak, new onset paroxysmal Afib with RVR, increased post op pain and drainage from incision site, pleural effusions, symptomatic orthostatic hypotension, urinary retention, post op anemia, and CP with spontaneous resolution. Patient was sent to [facility] rehab on 10/17, c/b AMS, sepsis with high fever, rigors, tachycardia on 10/20 found to have MSSA bacteremia with right upper lobe infiltrate, and UTI. CT spine no report of collection. Urine +ve E coli, transferred back to [facility] on 10/23 for MICHAEL, advanced spine imaging, and further management, found to have paraspinal abscess on MR imaging, S/p paraspinal collection aspiration on 10/25, culture growing gram positive cocci in pairs, with rare staph aureus. Neurosurgery advised no acute surgical revision necessary, repeat BCx negative. To continue long-term IV cefazolin via PICC until 12/12/24 (6 wks) with eventual transition to PO Duricef long term/indefinitely as per ID. MICHAEL was negative for valvular vegetations but did reveal incidental PFO. Hospital course significant for recurrent fevers, A fib, started AC. Now at [facility] for rehab.     MSSA Bacteremia   - s/p paraspinal collection aspiration 10/25 with Dr. Guerra  - continue Cefazolin 2g q8h via PICC until 12/12, then start Duricef 500mg bid indefinitely   - RADHA drain dislodged overnight, f/u CT abd/pelvis w/ and w/o contrast     Back Pain  - S/P T9-L3 open fusion with PSO/lag screw partial corpectomy/interbody with complex plastic closure (w/ Dr. Stroud and Dr. Tinajero on 9/29  - TLSO when OOB  - PT/OT per PMR  - Pain management per PMR    Hypertension  Orthostasis   - monitor vitals  - chlorthalidone on hold  - discontinued losartan 100mg (11/2-)  - on metoprolol 50mg bid for rate control with holding parameters. can increase if needed for Bp optimization and rate control    A-fib  - continue Eliquis AC   - lopressor 50mg bid    Anemia   - monitor     DVT Prophylaxis:  - Eliquis   73 y/o F with PMHx of OA, HTN, gastroparesis, peripheral neuropathy, multiple prior thoracolumbar spine surgeries for congenital scoliosis done >10 years ago out of state w/ removal of hardware (1991) and recent fall with T11-T12 fracture, s/p thoracolumbar posterior fusion for t spine fx with plastics closure on  9/29 with Dr. Stroud and Dr. Funk, c/b post op csf leak, new onset paroxysmal Afib with RVR, increased post op pain and drainage from incision site, pleural effusions, symptomatic orthostatic hypotension, urinary retention, post op anemia, and CP with spontaneous resolution. Patient was sent to [facility] rehab on 10/17, c/b AMS, sepsis with high fever, rigors, tachycardia on 10/20 found to have MSSA bacteremia with right upper lobe infiltrate, and UTI. CT spine no report of collection. Urine +ve E coli, transferred back to [facility] on 10/23 for MICHAEL, advanced spine imaging, and further management, found to have paraspinal abscess on MR imaging, S/p paraspinal collection aspiration on 10/25, culture growing gram positive cocci in pairs, with rare staph aureus. Neurosurgery advised no acute surgical revision necessary, repeat BCx negative. To continue long-term IV cefazolin via PICC until 12/12/24 (6 wks) with eventual transition to PO Duricef long term/indefinitely as per ID. MICHAEL was negative for valvular vegetations but did reveal incidental PFO. Hospital course significant for recurrent fevers, A fib, started AC. Now at [facility] for rehab.     MSSA Bacteremia   - s/p paraspinal collection aspiration 10/25 with Dr. Guerra  - continue Cefazolin 2g q8h via PICC until 12/12, then start Duricef 500mg bid indefinitely   - RADHA drain dislodged overnight, f/u CT abd/pelvis w/ and w/o contrast     Back Pain  - S/P T9-L3 open fusion with PSO/lag screw partial corpectomy/interbody with complex plastic closure (w/ Dr. Stroud and Dr. Tinajero on 9/29  - TLSO when OOB  - PT/OT per PMR  - Pain management per PMR    Hypertension  Orthostasis   - monitor vitals  - chlorthalidone on hold  - discontinued losartan 100mg (11/2-)  - on metoprolol 50mg bid for rate control with holding parameters. can increase if needed for Bp optimization and rate control    A-fib  - continue Eliquis AC   - lopressor 50mg bid    Anemia   - monitor     DVT Prophylaxis:  - Eliquis    d/w rehab team at [facility]

## 2024-11-10 NOTE — PROGRESS NOTE ADULT - ASSESSMENT
Assessment/Plan:  Mrs. Haley French is a 74-year-old female patient with past medical history of OA, HTN, gastroparesis, peripheral neuropathy, multiple prior thoracolumbar spine surgeries for congenital scoliosis [place] >[place] of state w/ removal of hardware (1991) who is admitted for Acute Inpatient Rehabilitation with a multidisciplinary rehab program at [facility] with functional impairments in ADLs and mobility secondary to mechanical fall reporting a T12 three column spine fracture-malalignment treated surgically with a T9-L3 open fusion with PSO/lag screw partial corpectomy/interbody by Dr. Stroud, Neurosurgeon with complex Plastic Surgery closure by Dr. Elias on 9/29/24 subsequently complicated with MSSA bacteremia secondary to a paraspinal abscess s/p paraspinal collection aspiration on 10/25/24.     #Traumatic T9-L3 vertebral fracture s/p corpectomy and fusion surgery c/b MSSA Bacteremia 2/2 paraspinal abscess  - Paraspinal abscess with Sepsis due to methicillin susceptible Staphylococcus aureus.       * 10/20 Bcx MSSA, 10/21 no growth at 24hrs      * CXR with old rib fracture and small RUL infiltrate      * 10/20 +UA for Ecoli, follow urine culture      * Lactic acidosis resolved, lactate 1.6 10/23      * 10/23 Na 134 K 3.3 Phos 1.5      * MRI: rim-enhancing heterogeneous fluid collection suspicious for an abscess; culture growing GPC in pairs and rare staph aureus       * TTE: no evidence of vegetations      * No Acute surgical intervention per neurosurgery       * S/P paraspinal collection aspiration with paraspinal abscess drain placed with Dr. Guerra 10/25      * Repeat daily Bcx- negative      * Monitor WBC and fever curve      * S/P PICC insertion 10/28      * TLSO when OOB      * Cefazolin 2000mg IV q8 until 12/12/24 via PICC- until 12/12/24-> Start PO Duricef 500mg BID on 12/13/24 - long term/indefinitely   - Weekly CBC Diff BMP ESR CRP; emailed to [email or web address]  - (10/31) CRP 56, ESR 95   - Activity Limitations: Decreased social, vocational and leisure activities, decreased self care and ADLs, decreased mobility, decreased ability to manage household and finances.   - Comprehensive Multidisciplinary Rehab Program:      * 3 hours a day, 5 days a week.      * PT 2hr/day: Focused on improving strength, endurance, coordination, balance, functional mobility, and transfers      * OT 1hr/day: Focused on improving strength, fine motor skills, coordination, posture and ADLs.      -----------------------------------------------------------------------------------  Concurrent Medical Problems    #Paroxysmal atrial fibrillation.   - 10/25 ECG and exam notable for irregular rate and rhythm  - Metoprolol to 25 BID for rate control  - S/p heparin gtt 3500mg IV q6 for anticoagulation  - EKG - tachy to 110s this am in the setting of pain    #Acute on chronic low back pain.   - S/P T9-L3 open fusion with PSO/lag screw partial corpectomy/interbody with complex plastic closure (w/ Dr. Stroud and Dr. Tinajero on 9/29  - TLSO when OOB  - Pain management: Tylenol 975mg ATC TID,  Duloxetine 20mg daily, Lyrica 100mg TID , Flexeril 10mg TID standing,   - Start hydromorphone 4 mg PO q8h PRN  - Eliquis 5mg BID    #HTN  #Orthostatic Hypotension  #HLD  - 10/1 TTE: EF 61%  - S/P 1L IVF bolus (10/13) for symptomatic hypotension with good effect  - Metoprolol to 25 BID  - Atorvastatin 40mg HS  - Losartan 100 mg daily  - Chlorthalidone 25 mg daily on hold  (restart if SBP is consistently above 140 per cardio)  - Midodrine 7.5 mg on hold    #Postoperative anemia  - S/p PRBCs intraoperatively for T spine fusion   - Hgb stable with no evidence of active bleed at this time   - 10/30- Hgb 8.6/ Hct 28.9, (10/31) 9.4/30.3    - Monitor H/H; transfuse for Hgb <7    #SILVANA (obstructive sleep apnea).   - Transient desaturations on RA    - Nocturnal 2L O2 and PRN during day during naps, monitor sats   - F/u with PCP outpatient as may benefit from sleep study and CPAP.    #Restless legs syndrome (RLS).   - Pramipexole 0.75mg daily     #Seasonal allergies.   - Montelukast 10mg daily    #Liver cyst.  -10/10 CT Abd/pelvis- There is a large cyst, stable since prior exam. There are small hypodense foci on segment II and segment I too small to characterize, unchanged since prior  - F/u outpatient     #Elevated serum alkaline phosphatase level.   - Hi serum alk phos levels 247 --> 228 --> 172 --> 233 --> 239--> 240--> 247--> 234---> 276 (10/31) 227  - Monitor serum alk phos levels.    #Gastritis.   - Vonoprazan 10mg daily  - Erythromycin 128mg oral fluid BID    #Mood/Cognition:  Neuropsychology consult PRN    #Sleep:   Maintain quiet hours and low stim environment.  Melatonin 6mg HS PRN to maximize participation in therapy during the day.     #GI/Bowel:  Senna QHS  Miralax PRN Daily    #/Bladder:   - PVR q 8 hours (SC if > 400)    #Skin/Pressure Injury:   - Skin assessment on admission: Midline sine dressing with drain in place, with minimal drainage. No pressure injuries.    #Diet/Supplement  Current Diet: Regular- Dash diet   -Ergocalciferol 92968G weekly   -Multivitamin daily     #Precautions / PROPHYLAXIS:   - Falls,  Spinal  - ortho: Weight bearing status: WBAT, TLSO brace OOB  - Lungs: Incentive Spirometer   - DVT ppx: SCDs, TEDs, Eliquis 5mg BID  - Pressure injury/Skin:  OOB to Chair, PT/OT      ---------------  Code Status: FULL  Emergency Contact:    Outpatient Follow-up (Specialty/Name of physician):    Fran Carr  Internal Medicine  [address], [address]  [place] [address]  Phone: [phone]  Fax: [phone]  Follow Up Time:     Kaushal Waterman  Infectious Disease  [address] [place] [address]  Phone: [phone]  Follow Up Time:  [facility]  Neurosurgery  Referral Assistance Program    Guille Lance  [facility]  ORTHOSURG [address]  Scheduled Appointment: 11/22/2024    Catalino Cotto  [facility]  WEIGHTMGMT  [facility]  Scheduled Appointment: 12/05/2024    Haydee Bernstein  [facility]  GASTRO [address]  Scheduled Appointment: 01/06/2025      [facility] - Infectious Disease  Infectious Disease  [address], [place] [address]  Phone: [phone]    --------------

## 2024-11-10 NOTE — PROGRESS NOTE ADULT - SUBJECTIVE AND OBJECTIVE BOX
Medicine Progress Note    Patient is a 74y old  Female who presents with a chief complaint of Traumatic T9-L3 vertebral fracture s/p corpectomy and fusion surgery c/b MSSA Bacteremia 2/2 paraspinal abscess (09 Nov 2024 13:24)      SUBJECTIVE / OVERNIGHT EVENTS:    ADDITIONAL REVIEW OF SYSTEMS:    MEDICATIONS  (STANDING):  acetaminophen     Tablet .. 975 milliGRAM(s) Oral every 8 hours  apixaban 5 milliGRAM(s) Oral every 12 hours  atorvastatin 40 milliGRAM(s) Oral at bedtime  ceFAZolin   IVPB 2000 milliGRAM(s) IV Intermittent every 8 hours  chlorhexidine 2% Cloths 1 Application(s) Topical daily  cyclobenzaprine 10 milliGRAM(s) Oral three times a day  diclofenac sodium 1% Gel 2 Gram(s) Topical two times a day  DULoxetine 20 milliGRAM(s) Oral daily  ergocalciferol 72700 Unit(s) Oral <User Schedule>  erythromycin    ethylsuccinate Suspension 40 mG/mL 128 milliGRAM(s) Oral every 12 hours  metoprolol tartrate 50 milliGRAM(s) Oral two times a day  montelukast 10 milliGRAM(s) Oral daily  multivitamin 1 Tablet(s) Oral daily  polyethylene glycol 3350 17 Gram(s) Oral two times a day  pramipexole 0.75 milliGRAM(s) Oral <User Schedule>  pregabalin 100 milliGRAM(s) Oral every 8 hours  senna 2 Tablet(s) Oral at bedtime  Vonoprazan (Voquezna) 10mg 1 Tablet(s),Vonoprazan (Voquezna) 10mg 1 Tablet(s) 1 Tablet(s) Oral <User Schedule>    MEDICATIONS  (PRN):  benzocaine/menthol Lozenge 1 Lozenge Oral every 3 hours PRN Mouth Sores  diphenhydrAMINE 25 milliGRAM(s) Oral two times a day PRN Rash and/or Itching  HYDROmorphone   Tablet 4 milliGRAM(s) Oral every 8 hours PRN Severe Pain (7 - 10)  melatonin 6 milliGRAM(s) Oral at bedtime PRN Insomnia    CAPILLARY BLOOD GLUCOSE        I&O's Summary      PHYSICAL EXAM:  Vital Signs Last 24 Hrs  T(C): 36.5 (09 Nov 2024 20:17), Max: 36.9 (09 Nov 2024 08:08)  T(F): 97.7 (09 Nov 2024 20:17), Max: 98.4 (09 Nov 2024 08:08)  HR: 100 (09 Nov 2024 20:17) (86 - 100)  BP: 137/75 (09 Nov 2024 20:17) (135/82 - 149/70)  BP(mean): --  RR: 16 (09 Nov 2024 20:17) (16 - 19)  SpO2: 97% (09 Nov 2024 20:17) (97% - 97%)    Parameters below as of 09 Nov 2024 20:17  Patient On (Oxygen Delivery Method): room air          LABS:                    COVID-19 PCR: NotDetec (30 Oct 2024 21:45)  COVID-19 PCR: NotDetec (21 Oct 2024 09:20)  COVID-19 PCR: NotDetec (17 Oct 2024 16:34)      RADIOLOGY & ADDITIONAL TESTS:  Imaging from Last 24 Hours:    Electrocardiogram/QTc Interval:    COORDINATION OF CARE:  Care Discussed with Consultants/Other Providers:   Medicine Progress Note    Patient is a 74y old  Female who presents with a chief complaint of Traumatic T9-L3 vertebral fracture s/p corpectomy and fusion surgery c/b MSSA Bacteremia 2/2 paraspinal abscess (09 Nov 2024 13:24)      SUBJECTIVE / OVERNIGHT EVENTS:  seen and examined  Chart reviewed  No overnight events  Limb weakness improving with therapy  BM+  No pain  No complaints  RADHA drain dislodged. CT result pending      ROS:  denied fever/chills/CP/SOB/cough/palpitation/dizziness/abdominal pian/nausea/vomiting/diarrhoea/constipation/dysuria/leg or calf pain/headaches.all other ROS neg    MEDICATIONS  (STANDING):  acetaminophen     Tablet .. 975 milliGRAM(s) Oral every 8 hours  apixaban 5 milliGRAM(s) Oral every 12 hours  atorvastatin 40 milliGRAM(s) Oral at bedtime  ceFAZolin   IVPB 2000 milliGRAM(s) IV Intermittent every 8 hours  chlorhexidine 2% Cloths 1 Application(s) Topical daily  cyclobenzaprine 10 milliGRAM(s) Oral three times a day  diclofenac sodium 1% Gel 2 Gram(s) Topical two times a day  DULoxetine 20 milliGRAM(s) Oral daily  ergocalciferol 06192 Unit(s) Oral <User Schedule>  erythromycin    ethylsuccinate Suspension 40 mG/mL 128 milliGRAM(s) Oral every 12 hours  metoprolol tartrate 50 milliGRAM(s) Oral two times a day  montelukast 10 milliGRAM(s) Oral daily  multivitamin 1 Tablet(s) Oral daily  polyethylene glycol 3350 17 Gram(s) Oral two times a day  pramipexole 0.75 milliGRAM(s) Oral <User Schedule>  pregabalin 100 milliGRAM(s) Oral every 8 hours  senna 2 Tablet(s) Oral at bedtime  Vonoprazan (Voquezna) 10mg 1 Tablet(s),Vonoprazan (Voquezna) 10mg 1 Tablet(s) 1 Tablet(s) Oral <User Schedule>    MEDICATIONS  (PRN):  benzocaine/menthol Lozenge 1 Lozenge Oral every 3 hours PRN Mouth Sores  diphenhydrAMINE 25 milliGRAM(s) Oral two times a day PRN Rash and/or Itching  HYDROmorphone   Tablet 4 milliGRAM(s) Oral every 8 hours PRN Severe Pain (7 - 10)  melatonin 6 milliGRAM(s) Oral at bedtime PRN Insomnia    CAPILLARY BLOOD GLUCOSE        I&O's Summary      PHYSICAL EXAM:  Vital Signs Last 24 Hrs  T(C): 36.5 (09 Nov 2024 20:17), Max: 36.9 (09 Nov 2024 08:08)  T(F): 97.7 (09 Nov 2024 20:17), Max: 98.4 (09 Nov 2024 08:08)  HR: 100 (09 Nov 2024 20:17) (86 - 100)  BP: 137/75 (09 Nov 2024 20:17) (135/82 - 149/70)  BP(mean): --  RR: 16 (09 Nov 2024 20:17) (16 - 19)  SpO2: 97% (09 Nov 2024 20:17) (97% - 97%)    Parameters below as of 09 Nov 2024 20:17  Patient On (Oxygen Delivery Method): room air    GENERAL: Not in distress. Alert    HEENT: clear conjuctiva, MMM. no pallor or icterus  CARDIOVASCULAR: RRR S1, S2. No murmur/rubs/gallop  LUNGS: BLAE+, no rales, no wheezing, no rhonchi.    ABDOMEN: ND. Soft,  NT, no guarding / rebound / rigidity. BS normoactive  BACK: No spine tenderness.  EXTREMITIES: no edema. no leg or calf TP.  SKIN: warm and dry  PSYCHIATRIC: Calm.  No agitation.  CNS: AAO. moves limbs, follows commands          LABS:                    COVID-19 PCR: NotDetec (30 Oct 2024 21:45)  COVID-19 PCR: NotDetec (21 Oct 2024 09:20)  COVID-19 PCR: NotDetec (17 Oct 2024 16:34)      RADIOLOGY & ADDITIONAL TESTS:  Imaging from Last 24 Hours:    Electrocardiogram/QTc Interval:    COORDINATION OF CARE:  Care Discussed with Consultants/Other Providers:

## 2024-11-11 ENCOUNTER — APPOINTMENT (OUTPATIENT)
Dept: GASTROENTEROLOGY | Facility: CLINIC | Age: 74
End: 2024-11-11

## 2024-11-11 LAB
ALBUMIN SERPL ELPH-MCNC: 2.9 G/DL — LOW (ref 3.3–5)
ALP SERPL-CCNC: 183 U/L — HIGH (ref 40–120)
ALT FLD-CCNC: 17 U/L — SIGNIFICANT CHANGE UP (ref 10–45)
ANION GAP SERPL CALC-SCNC: 10 MMOL/L — SIGNIFICANT CHANGE UP (ref 5–17)
AST SERPL-CCNC: 21 U/L — SIGNIFICANT CHANGE UP (ref 10–40)
BASOPHILS # BLD AUTO: 0.02 K/UL — SIGNIFICANT CHANGE UP (ref 0–0.2)
BASOPHILS NFR BLD AUTO: 0.4 % — SIGNIFICANT CHANGE UP (ref 0–2)
BILIRUB SERPL-MCNC: 0.4 MG/DL — SIGNIFICANT CHANGE UP (ref 0.2–1.2)
BUN SERPL-MCNC: 13 MG/DL — SIGNIFICANT CHANGE UP (ref 7–23)
CALCIUM SERPL-MCNC: 8.8 MG/DL — SIGNIFICANT CHANGE UP (ref 8.4–10.5)
CHLORIDE SERPL-SCNC: 105 MMOL/L — SIGNIFICANT CHANGE UP (ref 96–108)
CO2 SERPL-SCNC: 24 MMOL/L — SIGNIFICANT CHANGE UP (ref 22–31)
CREAT SERPL-MCNC: 0.8 MG/DL — SIGNIFICANT CHANGE UP (ref 0.5–1.3)
CRP SERPL-MCNC: 51 MG/L — HIGH
EGFR: 76 ML/MIN/1.73M2 — SIGNIFICANT CHANGE UP
EOSINOPHIL # BLD AUTO: 0.12 K/UL — SIGNIFICANT CHANGE UP (ref 0–0.5)
EOSINOPHIL NFR BLD AUTO: 2.5 % — SIGNIFICANT CHANGE UP (ref 0–6)
ERYTHROCYTE [SEDIMENTATION RATE] IN BLOOD: 91 MM/HR — HIGH (ref 0–20)
GLUCOSE SERPL-MCNC: 110 MG/DL — HIGH (ref 70–99)
HCT VFR BLD CALC: 31.1 % — LOW (ref 34.5–45)
HGB BLD-MCNC: 9.5 G/DL — LOW (ref 11.5–15.5)
IMM GRANULOCYTES NFR BLD AUTO: 0.2 % — SIGNIFICANT CHANGE UP (ref 0–0.9)
LYMPHOCYTES # BLD AUTO: 1.37 K/UL — SIGNIFICANT CHANGE UP (ref 1–3.3)
LYMPHOCYTES # BLD AUTO: 28.8 % — SIGNIFICANT CHANGE UP (ref 13–44)
MCHC RBC-ENTMCNC: 26.4 PG — LOW (ref 27–34)
MCHC RBC-ENTMCNC: 30.5 G/DL — LOW (ref 32–36)
MCV RBC AUTO: 86.4 FL — SIGNIFICANT CHANGE UP (ref 80–100)
MONOCYTES # BLD AUTO: 0.38 K/UL — SIGNIFICANT CHANGE UP (ref 0–0.9)
MONOCYTES NFR BLD AUTO: 8 % — SIGNIFICANT CHANGE UP (ref 2–14)
NEUTROPHILS # BLD AUTO: 2.85 K/UL — SIGNIFICANT CHANGE UP (ref 1.8–7.4)
NEUTROPHILS NFR BLD AUTO: 60.1 % — SIGNIFICANT CHANGE UP (ref 43–77)
NRBC # BLD: 0 /100 WBCS — SIGNIFICANT CHANGE UP (ref 0–0)
PLATELET # BLD AUTO: 410 K/UL — HIGH (ref 150–400)
POTASSIUM SERPL-MCNC: 3.4 MMOL/L — LOW (ref 3.5–5.3)
POTASSIUM SERPL-SCNC: 3.4 MMOL/L — LOW (ref 3.5–5.3)
PROT SERPL-MCNC: 7 G/DL — SIGNIFICANT CHANGE UP (ref 6–8.3)
RBC # BLD: 3.6 M/UL — LOW (ref 3.8–5.2)
RBC # FLD: 15.5 % — HIGH (ref 10.3–14.5)
SODIUM SERPL-SCNC: 139 MMOL/L — SIGNIFICANT CHANGE UP (ref 135–145)
WBC # BLD: 4.75 K/UL — SIGNIFICANT CHANGE UP (ref 3.8–10.5)
WBC # FLD AUTO: 4.75 K/UL — SIGNIFICANT CHANGE UP (ref 3.8–10.5)

## 2024-11-11 PROCEDURE — 99232 SBSQ HOSP IP/OBS MODERATE 35: CPT

## 2024-11-11 RX ORDER — HYDROMORPHONE HYDROCHLORIDE 2 MG/1
4 TABLET ORAL EVERY 8 HOURS
Refills: 0 | Status: DISCONTINUED | OUTPATIENT
Start: 2024-11-11 | End: 2024-11-18

## 2024-11-11 RX ORDER — POTASSIUM CHLORIDE 600 MG/1
40 TABLET, EXTENDED RELEASE ORAL ONCE
Refills: 0 | Status: COMPLETED | OUTPATIENT
Start: 2024-11-11 | End: 2024-11-11

## 2024-11-11 RX ORDER — PREGABALIN 75 MG/1
100 CAPSULE ORAL EVERY 8 HOURS
Refills: 0 | Status: DISCONTINUED | OUTPATIENT
Start: 2024-11-11 | End: 2024-11-18

## 2024-11-11 RX ADMIN — APIXABAN 5 MILLIGRAM(S): 2.5 TABLET, FILM COATED ORAL at 06:10

## 2024-11-11 RX ADMIN — ACETAMINOPHEN 500MG 975 MILLIGRAM(S): 500 TABLET, COATED ORAL at 13:04

## 2024-11-11 RX ADMIN — DICLOFENAC SODIUM 2 GRAM(S): 20 SOLUTION TOPICAL at 06:13

## 2024-11-11 RX ADMIN — DICLOFENAC SODIUM 2 GRAM(S): 20 SOLUTION TOPICAL at 18:00

## 2024-11-11 RX ADMIN — Medication 20 MILLIGRAM(S): at 13:02

## 2024-11-11 RX ADMIN — Medication 25 MILLIGRAM(S): at 23:29

## 2024-11-11 RX ADMIN — METOPROLOL TARTRATE 50 MILLIGRAM(S): 100 TABLET, FILM COATED ORAL at 06:10

## 2024-11-11 RX ADMIN — PREGABALIN 100 MILLIGRAM(S): 75 CAPSULE ORAL at 15:09

## 2024-11-11 RX ADMIN — PRAMIPEXOLE 0.75 MILLIGRAM(S): 1.5 TABLET, EXTENDED RELEASE ORAL at 15:09

## 2024-11-11 RX ADMIN — Medication 100 MILLIGRAM(S): at 06:11

## 2024-11-11 RX ADMIN — Medication 40 MILLIGRAM(S): at 22:05

## 2024-11-11 RX ADMIN — Medication 10 MILLIGRAM(S): at 13:03

## 2024-11-11 RX ADMIN — ACETAMINOPHEN 500MG 975 MILLIGRAM(S): 500 TABLET, COATED ORAL at 23:34

## 2024-11-11 RX ADMIN — PREGABALIN 100 MILLIGRAM(S): 75 CAPSULE ORAL at 22:03

## 2024-11-11 RX ADMIN — Medication 1 TABLET(S): at 13:03

## 2024-11-11 RX ADMIN — Medication 10 MILLIGRAM(S): at 06:10

## 2024-11-11 RX ADMIN — ACETAMINOPHEN 500MG 975 MILLIGRAM(S): 500 TABLET, COATED ORAL at 06:10

## 2024-11-11 RX ADMIN — APIXABAN 5 MILLIGRAM(S): 2.5 TABLET, FILM COATED ORAL at 18:00

## 2024-11-11 RX ADMIN — METOPROLOL TARTRATE 50 MILLIGRAM(S): 100 TABLET, FILM COATED ORAL at 18:00

## 2024-11-11 RX ADMIN — Medication 2 TABLET(S): at 22:04

## 2024-11-11 RX ADMIN — ACETAMINOPHEN 500MG 975 MILLIGRAM(S): 500 TABLET, COATED ORAL at 22:05

## 2024-11-11 RX ADMIN — Medication 10 MILLIGRAM(S): at 22:04

## 2024-11-11 RX ADMIN — PREGABALIN 100 MILLIGRAM(S): 75 CAPSULE ORAL at 06:15

## 2024-11-11 RX ADMIN — POTASSIUM CHLORIDE 40 MILLIEQUIVALENT(S): 600 TABLET, EXTENDED RELEASE ORAL at 15:09

## 2024-11-11 RX ADMIN — Medication 100 MILLIGRAM(S): at 22:04

## 2024-11-11 RX ADMIN — POLYETHYLENE GLYCOL 3350 17 GRAM(S): 17 POWDER, FOR SOLUTION ORAL at 06:15

## 2024-11-11 RX ADMIN — Medication 100 MILLIGRAM(S): at 15:09

## 2024-11-11 RX ADMIN — MONTELUKAST SODIUM 10 MILLIGRAM(S): 10 TABLET ORAL at 13:02

## 2024-11-11 NOTE — PROGRESS NOTE ADULT - SUBJECTIVE AND OBJECTIVE BOX
HPI:  Mrs. Haley French is a 74-year-old female patient with past medical history of OA, HTN, gastroparesis, peripheral neuropathy, multiple prior thoracolumbar spine surgeries for congenital scoliosis [place] >[place]/ removal of hardware (1991) who presented to [facility] on 9/28/24 with back pain and left hip pain s/p mechanical fall. CT imaging was performed and reported a T12 three column spine fracture-malalignment with fracture extending to the adjacent right 12th posterior rib and left T12-L1 facet joint and acute, displaced fracture of the left eighth lateral rib. Chronic appearing bilateral rib deformities. Surgical management was recommended and she is S/P T9-L3 open fusion with PSO/lag screw partial corpectomy/interbody by Dr. Stroud, Neurosurgeon with complex Plastic Surgery closure by Dr. Elias on 9/29/24. Post op course c/b CSK leak s/p repair, new onset paroxsymal Afib with RVR,  increased pain and drainage from surgical incision, development of pleural effusions, symptomatic orthostatic hypotension, urinary retention, post op anemia, and chest pain with spontaneous resolution. Cardiac work up negative. Patient was evaluated by PM&R and therapy for functional deficits, gait/ADL impairments and acute rehabilitation was recommended. Patient was cleared for discharge to [facility] IRF on 10/16/24.    Her admission was remarkable for a developing a fever of 103 with tachycardia and subjective fever/chills. Sepsis w/up was initiated and found to have small right upper lobe infiltrate, UTI, and MSSA bacteremia. CTH negative. Hospitalist and ID were consulted and per G+ bacteremia guidelines was recommended completing infectious workup, which includes MICHAEL which is not available at [facility]. Patient also required PICC line placement for long term ABx and a transfer request initiated from [facility] to [facility] for MICHAEL and completion of bacteremia workup/treatment. All other medical co-morbidities were stable. Patient was deemed medically stable for discharge to [facility] medicine on 10/21/24 and was subsequently transferred on 10/23/24 for MICHAEL, advanced spine imaging, and further management. She was found to have a paraspinal abscess on MR imaging and is s/p paraspinal collection aspiration on 10/25/24. Culture grew gram positive cocci in pairs, with rare staph aureus. Neurosurgery advised and no acute surgical revision was deemed necessary. Repeat BCx was negative. Recommendation to continue long-term IV cefazolin via PICC until 12/12/24 (6 wks) with eventual transition to PO Duricef long term/indefinitely per ID. MICHAEL was negative for valvular vegetations but did reveal incidental PFO. Hospital course was significant for recurrent fevers, A fib (resolved) with source control and electrolyte correction, initiated on DOAc given elevated [id number]. Patient was evaluated by PM&R and therapy for functional deficits, gait/ADL impairments and acute rehabilitation was recommended. Patient was cleared for discharge to [facility] IRF on 10/30/24.   (30 Oct 2024 13:05)    TDD: 11/20/24 to Home  ___________________________________________________________________________    SUBJECTIVE/ROS  Patient was seen and evaluated at bedside today.  Reported no overnight events and is in no acute distress.  Discussed CT imaging with IR team and reassured to retained material present.  Discussed with patient and reassurance provided.  All questions were answered and they expressed understanding and agreement.   Patient is eager to continue participation on the recommended rehabilitation program.  Denies any CP, SOB, POLLARD, palpitations, fever, chills, body aches, cough, congestion, or any other symptoms at this time.   ___________________________________________________________________________    Vital Signs Last 24 Hrs  HR: 108 (11 Nov 2024 08:12) (80 - 108)  BP: 150/79 (11 Nov 2024 08:12) (145/82 - 150/79)  RR: 16 (11 Nov 2024 08:12) (16 - 16)  SpO2: 96% (11 Nov 2024 08:12) (94% - 96%)    ___________________________________________________________________________    LAB                        9.5    4.75  )-----------( 410      ( 11 Nov 2024 06:50 )             31.1                           9.7    5.25  )-----------( 483      ( 07 Nov 2024 06:31 )             32.5       11-11    139  |  105  |  13  ----------------------------<  110[H]  3.4[L]   |  24  |  0.80    Ca    8.8      11 Nov 2024 06:50    TPro  7.0  /  Alb  2.9[L]  /  TBili  0.4  /  DBili  x   /  AST  21  /  ALT  17  /  AlkPhos  183[H]  11-11    LIVER FUNCTIONS - ( 11 Nov 2024 06:50 )  Alb: 2.9 g/dL / Pro: 7.0 g/dL / ALK PHOS: 183 U/L / ALT: 17 U/L / AST: 21 U/L / GGT: x               11-07    139  |  103  |  18  ----------------------------<  113[H]  4.1   |  29  |  0.90    Ca    9.5      07 Nov 2024 06:31    TPro  7.6  /  Alb  3.1[L]  /  TBili  0.4  /  DBili  x   /  AST  20  /  ALT  19  /  AlkPhos  197[H]  11-07    LIVER FUNCTIONS - ( 07 Nov 2024 06:31 )  Alb: 3.1 g/dL / Pro: 7.6 g/dL / ALK PHOS: 197 U/L / ALT: 19 U/L / AST: 20 U/L / GGT: x             ESR Historical Values  Sedimentation Rate, Erythrocyte (11.11.24 @ 06:50)   Sedimentation Rate, Erythrocyte: 91 mm/hr  Sedimentation Rate, Erythrocyte (11.04.24 @ 06:25)   Sedimentation Rate, Erythrocyte: 90 mm/hr  Sedimentation Rate, Erythrocyte (10.31.24 @ 06:00)   Sedimentation Rate, Erythrocyte: 95 mm/hr    CRP Historical Values  C-Reactive Protein (11.11.24 @ 06:50)   C-Reactive Protein: 51 mg/L  C-Reactive Protein (11.04.24 @ 06:25)   C-Reactive Protein: 50 mg/L  C-Reactive Protein (10.31.24 @ 06:00)   C-Reactive Protein: 56 mg/L  C-Reactive Protein (10.21.24 @ 06:20)   C-Reactive Protein: 183 mg/L    ___________________________________________________________________________    MEDICATIONS  (STANDING):  acetaminophen     Tablet .. 975 milliGRAM(s) Oral every 8 hours  apixaban 5 milliGRAM(s) Oral every 12 hours  atorvastatin 40 milliGRAM(s) Oral at bedtime  ceFAZolin   IVPB 2000 milliGRAM(s) IV Intermittent every 8 hours  chlorhexidine 2% Cloths 1 Application(s) Topical daily  cyclobenzaprine 10 milliGRAM(s) Oral three times a day  diclofenac sodium 1% Gel 2 Gram(s) Topical two times a day  DULoxetine 20 milliGRAM(s) Oral daily  ergocalciferol 21169 Unit(s) Oral <User Schedule>  erythromycin    ethylsuccinate Suspension 40 mG/mL 128 milliGRAM(s) Oral every 12 hours  metoprolol tartrate 50 milliGRAM(s) Oral two times a day  montelukast 10 milliGRAM(s) Oral daily  multivitamin 1 Tablet(s) Oral daily  polyethylene glycol 3350 17 Gram(s) Oral two times a day  pramipexole 0.75 milliGRAM(s) Oral <User Schedule>  pregabalin 100 milliGRAM(s) Oral every 8 hours  senna 2 Tablet(s) Oral at bedtime  Vonoprazan (Voquezna) 10mg 1 Tablet(s),Vonoprazan (Voquezna) 10mg 1 Tablet(s) 1 Tablet(s) Oral <User Schedule>    MEDICATIONS  (PRN):  benzocaine/menthol Lozenge 1 Lozenge Oral every 3 hours PRN Mouth Sores  diphenhydrAMINE 25 milliGRAM(s) Oral two times a day PRN Rash and/or Itching  HYDROmorphone   Tablet 4 milliGRAM(s) Oral every 8 hours PRN Severe Pain (7 - 10)  melatonin 6 milliGRAM(s) Oral at bedtime PRN Insomnia    ___________________________________________________________________________    PHYSICAL EXAM:    Gen - NAD, Comfortable  HEENT - NCAT, EOMI, PERRLA  Pulm - CTAB, No wheeze, No rhonchi, No crackles  Cardiovascular - RRR, S1S2, No murmurs  Abdomen - Soft, mildly tender in RLQ, (+) BS throughout  Extremities - No C/C/E, No calf tenderness B/L  Neuro-     Cognitive - AAOx3, follows command     Motor -                     LEFT    UE - ShAB 4/5, EF 5/5, EE 5/5, WE 5/5,  5/5                    RIGHT UE - ShAB 4/5, EF 5/5, EE 5/5, WE 5/5,  5/5                    LEFT    LE - HF 4/5, KE 5/5, DF 5/5, PF 5/5                    RIGHT LE - HF 4/5, KE 5/5, DF 5/5, PF 5/5        Sensory - Intact to LT in UEs and LEs B/L   Psychiatric - Mood stable, Affect WNL  Skin: Midline dressing with drain exiting spine in place with minimal to no collection in bag. Clean dry and skin intact without erythema or breakdown. 2, 1-2cm  small incision sites, 1 to L and 1 to R of Thoracic spine at site of prior drains. Healing well with skin approximated and no drainage or signs of infection.    ___________________________________________________________________________

## 2024-11-11 NOTE — PROGRESS NOTE ADULT - SUBJECTIVE AND OBJECTIVE BOX
Interval History  Patient seen and examined at bedside. No acute events noted.  Patient denies any acute complaints this morning. Pain is controlled.  RADHA drain dislodged over the weekend, CT T/L spine was done and are pending read.     ALLERGIES:  Dilantin (Urticaria)  penicillins (Urticaria)  erythromycin (Stomach Upset; Vomiting; Nausea)    MEDICATIONS  (STANDING):  acetaminophen     Tablet .. 975 milliGRAM(s) Oral every 8 hours  apixaban 5 milliGRAM(s) Oral every 12 hours  atorvastatin 40 milliGRAM(s) Oral at bedtime  ceFAZolin   IVPB 2000 milliGRAM(s) IV Intermittent every 8 hours  chlorhexidine 2% Cloths 1 Application(s) Topical daily  cyclobenzaprine 10 milliGRAM(s) Oral three times a day  diclofenac sodium 1% Gel 2 Gram(s) Topical two times a day  DULoxetine 20 milliGRAM(s) Oral daily  ergocalciferol 64754 Unit(s) Oral <User Schedule>  erythromycin    ethylsuccinate Suspension 40 mG/mL 128 milliGRAM(s) Oral every 12 hours  metoprolol tartrate 50 milliGRAM(s) Oral two times a day  montelukast 10 milliGRAM(s) Oral daily  multivitamin 1 Tablet(s) Oral daily  polyethylene glycol 3350 17 Gram(s) Oral two times a day  pramipexole 0.75 milliGRAM(s) Oral <User Schedule>  pregabalin 100 milliGRAM(s) Oral every 8 hours  senna 2 Tablet(s) Oral at bedtime  Vonoprazan (Voquezna) 10mg 1 Tablet(s),Vonoprazan (Voquezna) 10mg 1 Tablet(s) 1 Tablet(s) Oral <User Schedule>    MEDICATIONS  (PRN):  benzocaine/menthol Lozenge 1 Lozenge Oral every 3 hours PRN Mouth Sores  diphenhydrAMINE 25 milliGRAM(s) Oral two times a day PRN Rash and/or Itching  HYDROmorphone   Tablet 4 milliGRAM(s) Oral every 8 hours PRN Severe Pain (7 - 10)  melatonin 6 milliGRAM(s) Oral at bedtime PRN Insomnia    Vital Signs Last 24 Hrs  T(F): --  HR: 108 (11 Nov 2024 08:12) (80 - 108)  BP: 150/79 (11 Nov 2024 08:12) (145/82 - 150/79)  RR: 16 (11 Nov 2024 08:12) (16 - 16)  SpO2: 96% (11 Nov 2024 08:12) (94% - 96%)  I&O's Summary    PHYSICAL EXAM:  GENERAL: NAD  HEENT: NCAT  CHEST/LUNG: Clear to percussion bilaterally; No rales, rhonchi, wheezing  HEART: Regular rate and rhythm;  ABDOMEN: Soft, Nontender, Nondistended; Bowel sounds present  MUSCULOSKELETAL/EXTREMITIES:  2+ Peripheral Pulses, No LE edema  PSYCH: Appropriate affect  NEURO: Alert & Oriented    I personally reviewed the below data/images/labs:    LABS:                        9.5    4.75  )-----------( 410      ( 11 Nov 2024 06:50 )             31.1       11-11    139  |  105  |  13  ----------------------------<  110  3.4   |  24  |  0.80    Ca    8.8      11 Nov 2024 06:50    TPro  7.0  /  Alb  2.9  /  TBili  0.4  /  DBili  x   /  AST  21  /  ALT  17  /  AlkPhos  183  11-11                                  Urinalysis Basic - ( 11 Nov 2024 06:50 )    Color: x / Appearance: x / SG: x / pH: x  Gluc: 110 mg/dL / Ketone: x  / Bili: x / Urobili: x   Blood: x / Protein: x / Nitrite: x   Leuk Esterase: x / RBC: x / WBC x   Sq Epi: x / Non Sq Epi: x / Bacteria: x        COVID-19 PCR: NotDetec (10-30-24 @ 21:45)  COVID-19 PCR: NotDetec (10-21-24 @ 09:20)  COVID-19 PCR: NotDetec (10-17-24 @ 16:34)      Consultant(s) Notes Reviewed:   Care Discused with Consultants/Other Providers:  Imaging Personally Reviewed:          Interval History  Patient seen and examined at bedside. No acute events noted.  Patient denies any acute complaints this morning. Pain is controlled.  RADHA drain dislodged over the weekend, CT T/L spine was done and are pending read.     ALLERGIES:  Dilantin (Urticaria)  penicillins (Urticaria)  erythromycin (Stomach Upset; Vomiting; Nausea)    MEDICATIONS  (STANDING):  acetaminophen     Tablet .. 975 milliGRAM(s) Oral every 8 hours  apixaban 5 milliGRAM(s) Oral every 12 hours  atorvastatin 40 milliGRAM(s) Oral at bedtime  ceFAZolin   IVPB 2000 milliGRAM(s) IV Intermittent every 8 hours  chlorhexidine 2% Cloths 1 Application(s) Topical daily  cyclobenzaprine 10 milliGRAM(s) Oral three times a day  diclofenac sodium 1% Gel 2 Gram(s) Topical two times a day  DULoxetine 20 milliGRAM(s) Oral daily  ergocalciferol 07176 Unit(s) Oral <User Schedule>  erythromycin    ethylsuccinate Suspension 40 mG/mL 128 milliGRAM(s) Oral every 12 hours  metoprolol tartrate 50 milliGRAM(s) Oral two times a day  montelukast 10 milliGRAM(s) Oral daily  multivitamin 1 Tablet(s) Oral daily  polyethylene glycol 3350 17 Gram(s) Oral two times a day  pramipexole 0.75 milliGRAM(s) Oral <User Schedule>  pregabalin 100 milliGRAM(s) Oral every 8 hours  senna 2 Tablet(s) Oral at bedtime  Vonoprazan (Voquezna) 10mg 1 Tablet(s),Vonoprazan (Voquezna) 10mg 1 Tablet(s) 1 Tablet(s) Oral <User Schedule>    MEDICATIONS  (PRN):  benzocaine/menthol Lozenge 1 Lozenge Oral every 3 hours PRN Mouth Sores  diphenhydrAMINE 25 milliGRAM(s) Oral two times a day PRN Rash and/or Itching  HYDROmorphone   Tablet 4 milliGRAM(s) Oral every 8 hours PRN Severe Pain (7 - 10)  melatonin 6 milliGRAM(s) Oral at bedtime PRN Insomnia    Vital Signs Last 24 Hrs  T(F): --  HR: 108 (11 Nov 2024 08:12) (80 - 108)  BP: 150/79 (11 Nov 2024 08:12) (145/82 - 150/79)  RR: 16 (11 Nov 2024 08:12) (16 - 16)  SpO2: 96% (11 Nov 2024 08:12) (94% - 96%)  I&O's Summary    PHYSICAL EXAM:  GENERAL: NAD  HEENT: NCAT  CHEST/LUNG: Clear to percussion bilaterally; No rales, rhonchi, wheezing  HEART: Regular rate and rhythm;  ABDOMEN: Soft, Nontender, Nondistended; Bowel sounds present  MUSCULOSKELETAL/EXTREMITIES:  back brace in place, no LE edema   PSYCH: Appropriate affect  NEURO: Alert & Oriented x 3    I personally reviewed the below data/images/labs:    LABS:                        9.5    4.75  )-----------( 410      ( 11 Nov 2024 06:50 )             31.1       11-11    139  |  105  |  13  ----------------------------<  110  3.4   |  24  |  0.80    Ca    8.8      11 Nov 2024 06:50    TPro  7.0  /  Alb  2.9  /  TBili  0.4  /  DBili  x   /  AST  21  /  ALT  17  /  AlkPhos  183  11-11    Urinalysis Basic - ( 11 Nov 2024 06:50 )    Color: x / Appearance: x / SG: x / pH: x  Gluc: 110 mg/dL / Ketone: x  / Bili: x / Urobili: x   Blood: x / Protein: x / Nitrite: x   Leuk Esterase: x / RBC: x / WBC x   Sq Epi: x / Non Sq Epi: x / Bacteria: x    COVID-19 PCR: NotDetec (10-30-24 @ 21:45)  COVID-19 PCR: NotDetec (10-21-24 @ 09:20)  COVID-19 PCR: NotDetec (10-17-24 @ 16:34)    Consultant(s) Notes Reviewed:   Care Discused with Consultants/Other Providers:  Imaging Personally Reviewed:

## 2024-11-11 NOTE — PROGRESS NOTE ADULT - ASSESSMENT
Assessment/Plan:  Mrs. Haley French is a 74-year-old female patient with past medical history of OA, HTN, gastroparesis, peripheral neuropathy, multiple prior thoracolumbar spine surgeries for congenital scoliosis [place] >[place] w/ removal of hardware (1991) who is admitted for Acute Inpatient Rehabilitation with a multidisciplinary rehab program at [facility] with functional impairments in ADLs and mobility secondary to mechanical fall reporting a T12 three column spine fracture-malalignment treated surgically with a T9-L3 open fusion with PSO/lag screw partial corpectomy/interbody by Dr. Stroud, Neurosurgeon with complex Plastic Surgery closure by Dr. Elias on 9/29/24 subsequently complicated with MSSA bacteremia secondary to a paraspinal abscess s/p paraspinal collection aspiration on 10/25/24.     #Traumatic T9-L3 vertebral fracture s/p corpectomy and fusion surgery c/b MSSA Bacteremia 2/2 paraspinal abscess  - Paraspinal abscess with Sepsis due to methicillin susceptible Staphylococcus aureus.       * 10/20 Bcx MSSA, 10/21 no growth at 24hrs      * CXR with old rib fracture and small RUL infiltrate      * 10/20 +UA for Ecoli, follow urine culture      * Lactic acidosis resolved, lactate 1.6 10/23      * 10/23 Na 134 K 3.3 Phos 1.5      * MRI: rim-enhancing heterogeneous fluid collection suspicious for an abscess; culture growing GPC in pairs and rare staph aureus       * TTE: no evidence of vegetations      * No Acute surgical intervention per neurosurgery       * S/P paraspinal collection aspiration with paraspinal abscess drain placed with Dr. Guerra 10/25      * Repeat daily Bcx- negative      * Monitor WBC and fever curve      * S/P PICC insertion 10/28      * TLSO when OOB      * Cefazolin 2000mg IV q8 until 12/12/24 via PICC- until 12/12/24-> Start PO Duricef 500mg BID on 12/13/24 - long term/indefinitely   - Weekly CBC Diff BMP ESR CRP; emailed to [email or web address]  - (10/31) CRP 56, ESR 95   - Activity Limitations: Decreased social, vocational and leisure activities, decreased self care and ADLs, decreased mobility, decreased ability to manage household and finances.   - Comprehensive Multidisciplinary Rehab Program:      * 3 hours a day, 5 days a week.      * PT 2hr/day: Focused on improving strength, endurance, coordination, balance, functional mobility, and transfers      * OT 1hr/day: Focused on improving strength, fine motor skills, coordination, posture and ADLs.      -----------------------------------------------------------------------------------  Concurrent Medical Problems    #Paroxysmal atrial fibrillation.   - 10/25 ECG and exam notable for irregular rate and rhythm  - Metoprolol to 25 BID for rate control  - S/p heparin gtt 3500mg IV q6 for anticoagulation  - EKG - tachy to 110s this am in the setting of pain    #Acute on chronic low back pain.   - S/P T9-L3 open fusion with PSO/lag screw partial corpectomy/interbody with complex plastic closure (w/ Dr. Stroud and Dr. Tinajero on 9/29  - TLSO when OOB  - Pain management: Tylenol 975mg ATC TID,  Duloxetine 20mg daily, Lyrica 100mg TID , Flexeril 10mg TID standing,   - Start hydromorphone 4 mg PO q8h PRN  - Eliquis 5mg BID    #HTN  #Orthostatic Hypotension  #HLD  - 10/1 TTE: EF 61%  - S/P 1L IVF bolus (10/13) for symptomatic hypotension with good effect  - Metoprolol to 25 BID  - Atorvastatin 40mg HS  - Losartan 100 mg daily  - Chlorthalidone 25 mg daily on hold  (restart if SBP is consistently above 140 per cardio)  - Midodrine 7.5 mg on hold    #Postoperative anemia  - S/p PRBCs intraoperatively for T spine fusion   - Hgb stable with no evidence of active bleed at this time   - 10/30- Hgb 8.6/ Hct 28.9, (10/31) 9.4/30.3    - Monitor H/H; transfuse for Hgb <7    #SILVANA (obstructive sleep apnea).   - Transient desaturations on RA    - Nocturnal 2L O2 and PRN during day during naps, monitor sats   - F/u with PCP outpatient as may benefit from sleep study and CPAP.    #Restless legs syndrome (RLS).   - Pramipexole 0.75mg daily     #Seasonal allergies.   - Montelukast 10mg daily    #Liver cyst.  -10/10 CT Abd/pelvis- There is a large cyst, stable since prior exam. There are small hypodense foci on segment II and segment I too small to characterize, unchanged since prior  - F/u outpatient     #Elevated serum alkaline phosphatase level.   - Hi serum alk phos levels 247 --> 228 --> 172 --> 233 --> 239--> 240--> 247--> 234---> 276 (10/31) 227  - Monitor serum alk phos levels.    #Gastritis.   - Vonoprazan 10mg daily  - Erythromycin 128mg oral fluid BID    #Mood/Cognition:  Neuropsychology consult PRN    #Sleep:   Maintain quiet hours and low stim environment.  Melatonin 6mg HS PRN to maximize participation in therapy during the day.     #GI/Bowel:  Senna QHS  Miralax PRN Daily    #/Bladder:   - PVR q 8 hours (SC if > 400)    #Skin/Pressure Injury:   - Skin assessment on admission: Midline sine dressing with drain in place, with minimal drainage. No pressure injuries.    #Diet/Supplement  Current Diet: Regular- Dash diet   -Ergocalciferol 54492A weekly   -Multivitamin daily     #Precautions / PROPHYLAXIS:   - Falls,  Spinal  - ortho: Weight bearing status: WBAT, TLSO brace OOB  - Lungs: Incentive Spirometer   - DVT ppx: SCDs, TEDs, Eliquis 5mg BID  - Pressure injury/Skin:  OOB to Chair, PT/OT      ---------------  Code Status: FULL  Emergency Contact:    Outpatient Follow-up (Specialty/Name of physician):    Fran Carr  Internal Medicine  [address], [address]  [place] [address]  Phone: [phone]  Fax: [phone]  Follow Up Time:     Kaushal Waterman  Infectious Disease  [address] [place] [address]  Phone: [phone]  Follow Up Time:  [facility] Neurosurgery  Neurosurgery  Referral Assistance Program    Guille Lance  [facility]  ORTHOSURG [address]  Scheduled Appointment: 11/22/2024    Catalino Cotto  [facility] Physician On license of [facility]  WEIGHTMGMT  [facility]  Scheduled Appointment: 12/05/2024    Haydee Bernstein  [facility] Physician On license of [facility]  GASTRO [address]  Scheduled Appointment: 01/06/2025      [facility] - Infectious Disease  Infectious Disease  [address], [place] [address]  Phone: [phone]    --------------

## 2024-11-11 NOTE — PROGRESS NOTE ADULT - ASSESSMENT
74 year old female with PMHx of OA, HTN, gastroparesis, peripheral neuropathy, multiple prior thoracolumbar spine surgeries for congenital scoliosis done >[place] years ago out of [place] w/ removal of hardware (1991) and recent fall with T11-T12 fracture, s/p thoracolumbar posterior fusion for t spine fx with plastics closure on  9/29 with Dr. Stroud and Dr. Funk, c/b post op csf leak, new onset paroxysmal Afib with RVR, increased post op pain and drainage from incision site, pleural effusions, symptomatic orthostatic hypotension, urinary retention, post op anemia, and CP with spontaneous resolution. Patient was sent to [facility] rehab on 10/17, c/b AMS, sepsis with high fever, rigors, tachycardia on 10/20 found to have MSSA bacteremia with right upper lobe infiltrate, and UTI. CT spine no report of collection. Urine +ve E coli, transferred back to [facility] on 10/23 for MICHAEL, advanced spine imaging, and further management, found to have paraspinal abscess on MR imaging, S/p paraspinal collection aspiration on 10/25, culture growing gram positive cocci in pairs, with rare staph aureus. Neurosurgery advised no acute surgical revision necessary, repeat BCx negative. To continue long-term IV cefazolin via PICC until 12/12/24 (6 wks) with eventual transition to PO Duricef long term/indefinitely as per ID. MICHAEL was negative for valvular vegetations but did reveal incidental PFO. Hospital course significant for recurrent fevers, A fib, started AC. Now at [facility] for rehab.     Traumatic T9-L3 Vertebral Fracture s/p Corpectomy and Fusion Surgery 9/29  MSSA Bacteremia 2/2 Paraspinal Abscess   - 10/20 Bcx MSSA, repeat blood cultures 10/21 and 10/23 negative   -  MRI: rim-enhancing heterogeneous fluid collection suspicious for an abscess; culture growing GPC in pairs and rare staph aureus   - TTE: no evidence of vegetations  - No Acute surgical intervention per neurosurgery   - s/p paraspinal collection aspiration 10/25 with Dr. Guerra, fluid culture grew MSSA  - continue Cefazolin 2g q8h via PICC until 12/12, then start Duricef 500mg bid indefinitely   - Weekly CBC, BMP, ESR and CRP  - TLSO when OOB   - Pain control and bowel regimen per rehab team   - RADHA drain dislodged 11/9, follow up CT T/L spine read   - Continue comprehensive rehab program with PT/OT  - Outpatient follow up with spine surgeon     Hypertension  Orthostasis   - Continue metoprolol w/ holding paramters  - Continue to hold chlorthalidone and losartan   - chlorthalidone on hold  - Monitor BP    HLD  -Continue atorvastatin     Paroxysmal A-fib  -Continue Eliquis AC   -Continue lopressor 50mg bid    Post-Op Anemia  -H/H stable  - monitor     Gastritis  Gastroparesis   - Continue vonoprazan and erythromycin susp    RLS  -Continue pramipexole     Seasonal Allergies  -Continue montelukast     Mood  -Continue     Hypokalemia   -Will replete  -Monitor BMP    DVT Prophylaxis:  - Eliquis    Discussed with rehab team

## 2024-11-12 PROCEDURE — 99232 SBSQ HOSP IP/OBS MODERATE 35: CPT

## 2024-11-12 RX ORDER — SENNOSIDES 8.6 MG
1 TABLET ORAL AT BEDTIME
Refills: 0 | Status: DISCONTINUED | OUTPATIENT
Start: 2024-11-12 | End: 2024-11-16

## 2024-11-12 RX ORDER — FERROUS SULFATE 325(65) MG
325 TABLET ORAL AT BEDTIME
Refills: 0 | Status: DISCONTINUED | OUTPATIENT
Start: 2024-11-12 | End: 2024-11-20

## 2024-11-12 RX ADMIN — DICLOFENAC SODIUM 2 GRAM(S): 20 SOLUTION TOPICAL at 08:41

## 2024-11-12 RX ADMIN — Medication 100 MILLIGRAM(S): at 15:17

## 2024-11-12 RX ADMIN — ACETAMINOPHEN, DIPHENHYDRAMINE HCL, PHENYLEPHRINE HCL 6 MILLIGRAM(S): 325; 25; 5 TABLET ORAL at 21:36

## 2024-11-12 RX ADMIN — Medication 20 MILLIGRAM(S): at 11:30

## 2024-11-12 RX ADMIN — ACETAMINOPHEN 500MG 975 MILLIGRAM(S): 500 TABLET, COATED ORAL at 15:45

## 2024-11-12 RX ADMIN — PRAMIPEXOLE 0.75 MILLIGRAM(S): 1.5 TABLET, EXTENDED RELEASE ORAL at 15:16

## 2024-11-12 RX ADMIN — PREGABALIN 100 MILLIGRAM(S): 75 CAPSULE ORAL at 15:15

## 2024-11-12 RX ADMIN — ACETAMINOPHEN 500MG 975 MILLIGRAM(S): 500 TABLET, COATED ORAL at 22:15

## 2024-11-12 RX ADMIN — Medication 100 MILLIGRAM(S): at 21:36

## 2024-11-12 RX ADMIN — Medication 100 MILLIGRAM(S): at 05:34

## 2024-11-12 RX ADMIN — Medication 40 MILLIGRAM(S): at 21:35

## 2024-11-12 RX ADMIN — Medication 325 MILLIGRAM(S): at 21:37

## 2024-11-12 RX ADMIN — Medication 1 TABLET(S): at 11:31

## 2024-11-12 RX ADMIN — ACETAMINOPHEN 500MG 975 MILLIGRAM(S): 500 TABLET, COATED ORAL at 15:15

## 2024-11-12 RX ADMIN — MONTELUKAST SODIUM 10 MILLIGRAM(S): 10 TABLET ORAL at 11:30

## 2024-11-12 RX ADMIN — ACETAMINOPHEN 500MG 975 MILLIGRAM(S): 500 TABLET, COATED ORAL at 06:00

## 2024-11-12 RX ADMIN — ACETAMINOPHEN 500MG 975 MILLIGRAM(S): 500 TABLET, COATED ORAL at 05:33

## 2024-11-12 RX ADMIN — METOPROLOL TARTRATE 50 MILLIGRAM(S): 100 TABLET, FILM COATED ORAL at 05:33

## 2024-11-12 RX ADMIN — Medication 10 MILLIGRAM(S): at 05:34

## 2024-11-12 RX ADMIN — CHLORHEXIDINE GLUCONATE 1 APPLICATION(S): 1.2 RINSE ORAL at 11:34

## 2024-11-12 RX ADMIN — ACETAMINOPHEN 500MG 975 MILLIGRAM(S): 500 TABLET, COATED ORAL at 21:35

## 2024-11-12 RX ADMIN — PREGABALIN 100 MILLIGRAM(S): 75 CAPSULE ORAL at 21:36

## 2024-11-12 RX ADMIN — Medication 25 MILLIGRAM(S): at 21:36

## 2024-11-12 RX ADMIN — Medication 1 TABLET(S): at 21:36

## 2024-11-12 RX ADMIN — Medication 10 MILLIGRAM(S): at 21:36

## 2024-11-12 RX ADMIN — Medication 10 MILLIGRAM(S): at 15:17

## 2024-11-12 RX ADMIN — APIXABAN 5 MILLIGRAM(S): 2.5 TABLET, FILM COATED ORAL at 05:33

## 2024-11-12 RX ADMIN — Medication 128 MILLIGRAM(S): at 05:34

## 2024-11-12 RX ADMIN — Medication 1 TABLET(S): at 17:22

## 2024-11-12 RX ADMIN — METOPROLOL TARTRATE 50 MILLIGRAM(S): 100 TABLET, FILM COATED ORAL at 17:22

## 2024-11-12 RX ADMIN — DICLOFENAC SODIUM 2 GRAM(S): 20 SOLUTION TOPICAL at 17:26

## 2024-11-12 RX ADMIN — APIXABAN 5 MILLIGRAM(S): 2.5 TABLET, FILM COATED ORAL at 17:22

## 2024-11-12 RX ADMIN — PREGABALIN 100 MILLIGRAM(S): 75 CAPSULE ORAL at 05:36

## 2024-11-12 NOTE — PROGRESS NOTE ADULT - ASSESSMENT
Assessment/Plan:  Mrs. Haley French is a 74-year-old female patient with past medical history of OA, HTN, gastroparesis, peripheral neuropathy, multiple prior thoracolumbar spine surgeries for congenital scoliosis done >[place] ago [place] of [place] w/ removal of hardware (1991) who is admitted for Acute Inpatient Rehabilitation with a multidisciplinary rehab program at [facility] with functional impairments in ADLs and mobility secondary to mechanical fall reporting a T12 three column spine fracture-malalignment treated surgically with a T9-L3 open fusion with PSO/lag screw partial corpectomy/interbody by Dr. Stroud, Neurosurgeon with complex Plastic Surgery closure by Dr. Elias on 9/29/24 subsequently complicated with MSSA bacteremia secondary to a paraspinal abscess s/p paraspinal collection aspiration on 10/25/24.     #Traumatic T9-L3 vertebral fracture s/p corpectomy and fusion surgery c/b MSSA Bacteremia 2/2 paraspinal abscess  - Paraspinal abscess with Sepsis due to methicillin susceptible Staphylococcus aureus.       * 10/20 Bcx MSSA, 10/21 no growth at 24hrs      * CXR with old rib fracture and small RUL infiltrate      * 10/20 +UA for Ecoli      * MRI: rim-enhancing heterogeneous fluid collection suspicious for an abscess; culture growing GPC in pairs and rare staph aureus       * TTE: no evidence of vegetations      * No Acute surgical intervention per neurosurgery       * S/P paraspinal collection aspiration with paraspinal abscess drain placed with Dr. Guerra 10/25; dislodged accidentally 11/9 with CT showing no retained elements of drain      * Monitor WBC and fever curve      * S/P PICC insertion 10/28      * TLSO when OOB      * Cefazolin 2000mg IV q8 via PICC- until 12/12/24-> Start PO Duricef 500mg BID on 12/13/24 - long term/indefinitely   - Weekly CBC Diff BMP ESR CRP; emailed to [email or web address]  - Activity Limitations: Decreased social, vocational and leisure activities, decreased self care and ADLs, decreased mobility, decreased ability to manage household and finances.   - Comprehensive Multidisciplinary Rehab Program:      * 3 hours a day, 5 days a week.      * PT 2hr/day: Focused on improving strength, endurance, coordination, balance, functional mobility, and transfers      * OT 1hr/day: Focused on improving strength, fine motor skills, coordination, posture and ADLs.      -----------------------------------------------------------------------------------  Concurrent Medical Problems    #Paroxysmal atrial fibrillation  - Metoprolol to 50mg PO BID for rate control  - Eliquis 5mg PO BID    #Acute on chronic low back pain.   - S/P T9-L3 open fusion with PSO/lag screw partial corpectomy/interbody with complex plastic closure (w/ Dr. Stroud and Dr. Tinajero on 9/29  - TLSO when OOB  - Tylenol 975mg PO TID ATC  - Duloxetine 20mg PO daily  - Lyrica 100mg PO TID   - Flexeril 10mg PO TID standing  - diclofenac gel 2g topically BID  - hydromorphone 4mg PO q8h PRN for severe pain    #HTN  #Orthostatic Hypotension  - 10/1 TTE: EF 61%  - S/P 1L IVF bolus (10/13) for symptomatic hypotension with good effect  - metoprolol tartrate 50mg PO BID  - d/reagan losartan  - Chlorthalidone 25 mg daily on hold (restart if SBP is consistently above 140 per cardio)  - Midodrine 7.5 mg on hold    #HLD  - atorvastatin 40mg PO qhs    #Postoperative anemia  - S/p PRBCs intraoperatively for T spine fusion   - Hgb stable with no evidence of active bleed at this time   - 11/11 Hgb 9.5  - Monitor H/H; transfuse for Hgb <7    #SILVANA (obstructive sleep apnea)  - Transient desaturations on RA    - Nocturnal 2L O2 and PRN during day during naps, monitor sats   - F/u with PCP outpatient as may benefit from sleep study and CPAP    #Restless legs syndrome (RLS)  - pramipexole 0.75mg PO daily   - s/w ferrous sulfate 325mg PO daily 11/12    #Seasonal allergies  - montelukast 10mg daily    #Liver cyst  -10/10 CT Abd/pelvis- There is a large cyst, stable since prior exam. There are small hypodense foci on segment II and segment I too small to characterize, unchanged since prior  - f/u outpatient     #Elevated serum alkaline phosphatase level  - high serum alk phos levels 247 --> 228 --> 172 --> 233 --> 239--> 240--> 247--> 234---> 276 -->227 --> 183  - monitor serum alk phos levels    #Gastritis  - Vonoprazan 10mg PO daily  - erythromycin 128mg oral fluid BID    #Mood/Cognition:  - neuropsychology consult PRN    #Sleep:   - maintain quiet hours and low stim environment  - melatonin 6mg HS PRN to maximize participation in therapy during the day    #GI/Bowel:  - senna 1 tablet PO qhs  - d/c Miralax iso soft stools/diarrhea 11/12    #/Bladder:   - PVR q 8 hours (SC if > 400)    #Skin/Pressure Injury:   - Skin assessment on admission: Midline sine dressing with drain in place, with minimal drainage. No pressure injuries    #Diet/Supplement  Current Diet: Regular  - ergocalciferol 92812L PO weekly on Fridays  - multivitamin PO daily     #Precautions / PROPHYLAXIS:   - Falls, Spinal  - ortho: Weight bearing status: WBAT, TLSO brace OOB  - Lungs: Incentive Spirometer   - DVT ppx: SCDs, TEDs, Eliquis 5mg PO BID  - Pressure injury/Skin:  OOB to Chair, PT/OT      ---------------  Code Status: FULL  Emergency Contact:    Outpatient Follow-up:    Fran Carr  Internal Medicine  [address], [address]  [place] [address]  Phone: [phone]  Fax: [phone]  Follow Up Time:     Kaushal Waterman  Infectious Disease  [address] [place] [address]  Phone: [phone]  Follow Up Time:  [facility] Neurosurgery  Neurosurgery  Referral Assistance Program    Guille Lance  [facility]  ORTHOSURG [address]  Scheduled Appointment: 11/22/2024    Catalino Cotto  [facility]  WEIGHTMGMT  [facility]  Scheduled Appointment: 12/05/2024    Haydee Bernstein  [facility]  GASTRO [address]  Scheduled Appointment: 01/06/2025      [facility] - Infectious Disease  Infectious Disease  [address] [facility], [place] [address]  Phone: [phone]    --------------

## 2024-11-12 NOTE — PROGRESS NOTE ADULT - ASSESSMENT
74 year old female with PMHx of OA, HTN, gastroparesis, peripheral neuropathy, multiple prior thoracolumbar spine surgeries for congenital scoliosis [place] >[place] w/ [place] of hardware (1991) and recent fall with T11-T12 fracture, s/p thoracolumbar posterior fusion for t spine fx with plastics closure on  9/29 with Dr. Stroud and Dr. Funk, c/b post op csf leak, new onset paroxysmal Afib with RVR, increased post op pain and drainage from incision site, pleural effusions, symptomatic orthostatic hypotension, urinary retention, post op anemia, and CP with spontaneous resolution. Patient was sent to [facility] rehab on 10/17, c/b AMS, sepsis with high fever, rigors, tachycardia on 10/20 found to have MSSA bacteremia with right upper lobe infiltrate, and UTI. CT spine no report of collection. Urine +ve E coli, transferred back to [facility] on 10/23 for MICHAEL, advanced spine imaging, and further management, found to have paraspinal abscess on MR imaging, S/p paraspinal collection aspiration on 10/25, culture growing gram positive cocci in pairs, with rare staph aureus. Neurosurgery advised no acute surgical revision necessary, repeat BCx negative. To continue long-term IV cefazolin via PICC until 12/12/24 (6 wks) with eventual transition to PO Duricef long term/indefinitely as per ID. MICHAEL was negative for valvular vegetations but did reveal incidental PFO. Hospital course significant for recurrent fevers, A fib, started AC. Now at [facility] for rehab.     Traumatic T9-L3 Vertebral Fracture s/p Corpectomy and Fusion Surgery 9/29  MSSA Bacteremia 2/2 Paraspinal Abscess   - 10/20 Bcx MSSA, repeat blood cultures 10/21 and 10/23 negative   -  MRI: rim-enhancing heterogeneous fluid collection suspicious for an abscess; culture growing GPC in pairs and rare staph aureus   - TTE: no evidence of vegetations  - No Acute surgical intervention per neurosurgery   - s/p paraspinal collection aspiration 10/25 with Dr. Guerra, fluid culture grew MSSA  - continue Cefazolin 2g q8h via PICC until 12/12, then start Duricef 500mg bid indefinitely   - Weekly CBC, BMP, ESR and CRP  - TLSO when OOB   - Pain control and bowel regimen per rehab team   - RADHA drain dislodeged on 11/9, CT T/L Spine 11/9 showed Long segment spinal fusion as described. Improved paraspinal collections. No retained drain fragment identified.  - Continue comprehensive rehab program with PT/OT  - Outpatient follow up with spine surgeon     Hypertension  Orthostasis   - Continue metoprolol w/ holding paramters  - Continue to hold chlorthalidone and losartan   - Monitor BP    HLD  -Continue atorvastatin     Paroxysmal A-fib  -Continue Eliquis AC   -Continue lopressor 50mg bid    Post-Op Anemia  -H/H stable  -Started on ferrous sulfate   -Monitor CBC     Gastritis  Gastroparesis   - Continue vonoprazan and erythromycin susp    RLS  -Continue pramipexole     Seasonal Allergies  -Continue montelukast     Hypokalemia   -s/p repletion   -Monitor BMP    Loose Stool Likely 2/2 Laxatives   -Miralax discontinued and senna reduced to 1 tab QHS per primary team.   -Will continue to monitor.    DVT Prophylaxis:  - Eliquis    Discussed with rehab team

## 2024-11-12 NOTE — PROGRESS NOTE ADULT - SUBJECTIVE AND OBJECTIVE BOX
Interval History  Patient seen and examined at bedside. No acute events noted.  Patient reports having 4-5 episodes of loose stool, denies abdominal pain/N/V/fever/chills. She is on laxatives.  Miralax discontinued and senna reduced to 1 tab QHS per primary team. Will continue to monitor.  Otherwise she feels well, pain is controlled on current regimen.    ALLERGIES:  Dilantin (Urticaria)  penicillins (Urticaria)  erythromycin (Stomach Upset; Vomiting; Nausea)    MEDICATIONS  (STANDING):  acetaminophen     Tablet .. 975 milliGRAM(s) Oral every 8 hours  apixaban 5 milliGRAM(s) Oral every 12 hours  atorvastatin 40 milliGRAM(s) Oral at bedtime  ceFAZolin   IVPB 2000 milliGRAM(s) IV Intermittent every 8 hours  chlorhexidine 2% Cloths 1 Application(s) Topical daily  cyclobenzaprine 10 milliGRAM(s) Oral three times a day  diclofenac sodium 1% Gel 2 Gram(s) Topical two times a day  DULoxetine 20 milliGRAM(s) Oral daily  ergocalciferol 63085 Unit(s) Oral <User Schedule>  erythromycin    ethylsuccinate Suspension 40 mG/mL 128 milliGRAM(s) Oral every 12 hours  ferrous    sulfate 325 milliGRAM(s) Oral at bedtime  lactobacillus acidophilus 1 Tablet(s) Oral every 12 hours  metoprolol tartrate 50 milliGRAM(s) Oral two times a day  montelukast 10 milliGRAM(s) Oral daily  multivitamin 1 Tablet(s) Oral daily  pramipexole 0.75 milliGRAM(s) Oral <User Schedule>  pregabalin 100 milliGRAM(s) Oral every 8 hours  senna 1 Tablet(s) Oral at bedtime  Vonoprazan (Voquezna) 10mg 1 Tablet(s),Vonoprazan (Voquezna) 10mg 1 Tablet(s) 1 Tablet(s) Oral <User Schedule>    MEDICATIONS  (PRN):  benzocaine/menthol Lozenge 1 Lozenge Oral every 3 hours PRN Mouth Sores  diphenhydrAMINE 25 milliGRAM(s) Oral two times a day PRN Rash and/or Itching  HYDROmorphone   Tablet 4 milliGRAM(s) Oral every 8 hours PRN Severe Pain (7 - 10)  melatonin 6 milliGRAM(s) Oral at bedtime PRN Insomnia    Vital Signs Last 24 Hrs  T(F): 98.3 (12 Nov 2024 08:13), Max: 98.3 (12 Nov 2024 08:13)  HR: 88 (12 Nov 2024 08:13) (88 - 90)  BP: 149/79 (12 Nov 2024 08:13) (128/75 - 149/79)  RR: 17 (12 Nov 2024 08:13) (17 - 17)  SpO2: 96% (12 Nov 2024 08:13) (96% - 98%)  I&O's Summary    PHYSICAL EXAM:  GENERAL: NAD  HEENT: NCAT  CHEST/LUNG: Clear to percussion bilaterally; No rales, rhonchi, wheezing  HEART: Regular rate and rhythm;  ABDOMEN: Soft, Nontender, Nondistended; Bowel sounds present  MUSCULOSKELETAL/EXTREMITIES:  back brace in place, no LE edema   PSYCH: Appropriate affect  NEURO: Alert & Oriented x 3    I personally reviewed the below data/images/labs:    LABS:                        9.5    4.75  )-----------( 410      ( 11 Nov 2024 06:50 )             31.1       11-11    139  |  105  |  13  ----------------------------<  110  3.4   |  24  |  0.80    Ca    8.8      11 Nov 2024 06:50    TPro  7.0  /  Alb  2.9  /  TBili  0.4  /  DBili  x   /  AST  21  /  ALT  17  /  AlkPhos  183  11-11    Urinalysis Basic - ( 11 Nov 2024 06:50 )    Color: x / Appearance: x / SG: x / pH: x  Gluc: 110 mg/dL / Ketone: x  / Bili: x / Urobili: x   Blood: x / Protein: x / Nitrite: x   Leuk Esterase: x / RBC: x / WBC x   Sq Epi: x / Non Sq Epi: x / Bacteria: x    COVID-19 PCR: NotDetec (10-30-24 @ 21:45)  COVID-19 PCR: NotDetec (10-21-24 @ 09:20)  COVID-19 PCR: NotDetec (10-17-24 @ 16:34)    Consultant(s) Notes Reviewed:   Care Discused with Consultants/Other Providers:  Imaging Personally Reviewed:

## 2024-11-12 NOTE — PROGRESS NOTE ADULT - SUBJECTIVE AND OBJECTIVE BOX
Patient is a 74y old  Female who presents with a chief complaint of Traumatic T9-L3 vertebral fracture s/p corpectomy and fusion surgery c/b MSSA Bacteremia 2/2 paraspinal abscess (11 Nov 2024 12:38)      HPI:  Mrs. Haley French is a 74-year-old female patient with past medical history of OA, HTN, gastroparesis, peripheral neuropathy, multiple prior thoracolumbar spine surgeries for congenital scoliosis [place] >[place] of [place] w/ removal of hardware (1991) who presented to [facility] on 9/28/24 with back pain and left hip pain s/p mechanical fall. CT imaging was performed and reported a T12 three column spine fracture-malalignment with fracture extending to the adjacent right 12th posterior rib and left T12-L1 facet joint and acute, displaced fracture of the left eighth lateral rib. Chronic appearing bilateral rib deformities. Surgical management was recommended and she is S/P T9-L3 open fusion with PSO/lag screw partial corpectomy/interbody by Dr. Stroud, Neurosurgeon with complex Plastic Surgery closure by Dr. Elias on 9/29/24. Post op course c/b CSK leak s/p repair, new onset paroxsymal Afib with RVR,  increased pain and drainage from surgical incision, development of pleural effusions, symptomatic orthostatic hypotension, urinary retention, post op anemia, and chest pain with spontaneous resolution. Cardiac work up negative. Patient was evaluated by PM&R and therapy for functional deficits, gait/ADL impairments and acute rehabilitation was recommended. Patient was cleared for discharge to [facility] IRF on 10/16/24.    Her admission was remarkable for a developing a fever of 103 with tachycardia and subjective fever/chills. Sepsis w/up was initiated and found to have small right upper lobe infiltrate, UTI, and MSSA bacteremia. CTH negative. Hospitalist and ID were consulted and per G+ bacteremia guidelines was recommended completing infectious workup, which includes MICHAEL which is not available at [facility]. Patient also required PICC line placement for long term ABx and a transfer request initiated from [place] to [facility] for MICHAEL and completion of bacteremia workup/treatment. All other medical co-morbidities were stable. Patient was deemed medically stable for discharge to [facility] medicine on 10/21/24 and was subsequently transferred on 10/23/24 for MICHAEL, advanced spine imaging, and further management. She was found to have a paraspinal abscess on MR imaging and is s/p paraspinal collection aspiration on 10/25/24. Culture grew gram positive cocci in pairs, with rare staph aureus. Neurosurgery advised and no acute surgical revision was deemed necessary. Repeat BCx was negative. Recommendation to continue long-term IV cefazolin via PICC until 12/12/24 (6 wks) with eventual transition to PO Duricef long term/indefinitely per ID. MICHAEL was negative for valvular vegetations but did reveal incidental PFO. Hospital course was significant for recurrent fevers, A fib (resolved) with source control and electrolyte correction, initiated on DOAc given elevated [id number]. Patient was evaluated by PM&R and therapy for functional deficits, gait/ADL impairments and acute rehabilitation was recommended. Patient was cleared for discharge to [facility] IRF on 10/30/24.   (30 Oct 2024 13:05)        SUBJECTIVE/ROS: Patient seen and examined. No acute overnight events. Reports poor sleep secondary to severe restless leg syndrome, which has worsened since admission. She reports she spoke with a specialist who recommended taking iron supplements with orange juice every night. She also reports having diarrhea/soft stools for the past week that can be annoying due to having to get up and apply her brace constantly.  No other concerns.     ----------------------------------------------------------------------    RADIOLOGY    CT Lumbar Spine w/wo IV Cont (11.09.24 @ 13:55)  IMPRESSION:  Long segment spinal fusion as described. Improved paraspinal collections.   No retained drain fragment identified.      ----------------------------------------------------------------------    VITALS  74y  Vital Signs Last 24 Hrs  T(C): 36.8 (12 Nov 2024 08:13), Max: 36.8 (12 Nov 2024 08:13)  T(F): 98.3 (12 Nov 2024 08:13), Max: 98.3 (12 Nov 2024 08:13)  HR: 88 (12 Nov 2024 08:13) (88 - 90)  BP: 149/79 (12 Nov 2024 08:13) (128/75 - 149/79)  RR: 17 (12 Nov 2024 08:13) (17 - 17)  SpO2: 96% (12 Nov 2024 08:13) (96% - 98%)    Parameters below as of 12 Nov 2024 08:13  Patient On (Oxygen Delivery Method): room air      ----------------------------------------------------------------------    RECENT LABS:             9.5    4.75  )-----------( 410      ( 11 Nov 2024 06:50 )             31.1     139  |  105  |  13  ----------------------------<  110[H]  3.4[L]   |  24  |  0.80    Ca    8.8      11 Nov 2024 06:50  TPro  7.0  /  Alb  2.9[L]  /  TBili  0.4  /  DBili  x   /  AST  21  /  ALT  17  /  AlkPhos  183[H]  11-11    LIVER FUNCTIONS - ( 11 Nov 2024 06:50 )  Alb: 2.9 g/dL / Pro: 7.0 g/dL / ALK PHOS: 183 U/L / ALT: 17 U/L / AST: 21 U/L / GGT: x             ----------------------------------------------------------------------    MEDICATIONS:  MEDICATIONS  (STANDING):  acetaminophen     Tablet .. 975 milliGRAM(s) Oral every 8 hours  apixaban 5 milliGRAM(s) Oral every 12 hours  atorvastatin 40 milliGRAM(s) Oral at bedtime  ceFAZolin   IVPB 2000 milliGRAM(s) IV Intermittent every 8 hours  chlorhexidine 2% Cloths 1 Application(s) Topical daily  cyclobenzaprine 10 milliGRAM(s) Oral three times a day  diclofenac sodium 1% Gel 2 Gram(s) Topical two times a day  DULoxetine 20 milliGRAM(s) Oral daily  ergocalciferol 83641 Unit(s) Oral <User Schedule>  erythromycin    ethylsuccinate Suspension 40 mG/mL 128 milliGRAM(s) Oral every 12 hours  metoprolol tartrate 50 milliGRAM(s) Oral two times a day  montelukast 10 milliGRAM(s) Oral daily  multivitamin 1 Tablet(s) Oral daily  polyethylene glycol 3350 17 Gram(s) Oral two times a day  pramipexole 0.75 milliGRAM(s) Oral <User Schedule>  pregabalin 100 milliGRAM(s) Oral every 8 hours  senna 2 Tablet(s) Oral at bedtime  Vonoprazan (Voquezna) 10mg 1 Tablet(s),Vonoprazan (Voquezna) 10mg 1 Tablet(s) 1 Tablet(s) Oral <User Schedule>    MEDICATIONS  (PRN):  benzocaine/menthol Lozenge 1 Lozenge Oral every 3 hours PRN Mouth Sores  diphenhydrAMINE 25 milliGRAM(s) Oral two times a day PRN Rash and/or Itching  HYDROmorphone   Tablet 4 milliGRAM(s) Oral every 8 hours PRN Severe Pain (7 - 10)  melatonin 6 milliGRAM(s) Oral at bedtime PRN Insomnia      ----------------------------------------------------------------------    PHYSICAL EXAM:     Constitutional - NAD, Comfortable  Chest - good chest expansion, good respiratory effort  Cardio - warm and well perfused  Extremities - No peripheral edema, No calf tenderness   Neurologic Exam:                    Cognitive -             Orientation: Awake, A&O to self, place, date, year, situation     Speech - Fluent, Comprehensible, No dysarthria, No aphasia        Sensory - Intact to LT bilateral LE, but diminished  Psychiatric - Mood stable, Affect WNL  ---------------------------------------------------------------------- HPI:  Mrs. Haley French is a 74-year-old female patient with past medical history of OA, HTN, gastroparesis, peripheral neuropathy, multiple prior thoracolumbar spine surgeries for congenital scoliosis done >[place] years ago out of [place] w/ removal of hardware (1991) who presented to [facility] on 9/28/24 with back pain and left hip pain s/p mechanical fall. CT imaging was performed and reported a T12 three column spine fracture-malalignment with fracture extending to the adjacent right 12th posterior rib and left T12-L1 facet joint and acute, displaced fracture of the left eighth lateral rib. Chronic appearing bilateral rib deformities. Surgical management was recommended and she is S/P T9-L3 open fusion with PSO/lag screw partial corpectomy/interbody by Dr. Stroud, Neurosurgeon with complex Plastic Surgery closure by Dr. Elias on 9/29/24. Post op course c/b CSK leak s/p repair, new onset paroxsymal Afib with RVR,  increased pain and drainage from surgical incision, development of pleural effusions, symptomatic orthostatic hypotension, urinary retention, post op anemia, and chest pain with spontaneous resolution. Cardiac work up negative. Patient was evaluated by PM&R and therapy for functional deficits, gait/ADL impairments and acute rehabilitation was recommended. Patient was cleared for discharge to [facility] IRF on 10/16/24.    Her admission was remarkable for a developing a fever of 103 with tachycardia and subjective fever/chills. Sepsis w/up was initiated and found to have small right upper lobe infiltrate, UTI, and MSSA bacteremia. CTH negative. Hospitalist and ID were consulted and per G+ bacteremia guidelines was recommended completing infectious workup, which includes MICHAEL which is not available at [facility]. Patient also required PICC line placement for long term ABx and a transfer request initiated from [place] to [facility] for MICHAEL and completion of bacteremia workup/treatment. All other medical co-morbidities were stable. Patient was deemed medically stable for discharge to [facility] medicine on 10/21/24 and was subsequently transferred on 10/23/24 for MICHAEL, advanced spine imaging, and further management. She was found to have a paraspinal abscess on MR imaging and is s/p paraspinal collection aspiration on 10/25/24. Culture grew gram positive cocci in pairs, with rare staph aureus. Neurosurgery advised and no acute surgical revision was deemed necessary. Repeat BCx was negative. Recommendation to continue long-term IV cefazolin via PICC until 12/12/24 (6 wks) with eventual transition to PO Duricef long term/indefinitely per ID. MICHAEL was negative for valvular vegetations but did reveal incidental PFO. Hospital course was significant for recurrent fevers, A fib (resolved) with source control and electrolyte correction, initiated on DOAc given elevated [id number]. Patient was evaluated by PM&R and therapy for functional deficits, gait/ADL impairments and acute rehabilitation was recommended. Patient was cleared for discharge to [facility] IRF on 10/30/24.   (30 Oct 2024 13:05)    TDD: 11/20/24 to Home  ___________________________________________________________________________    SUBJECTIVE/ROS:   Patient seen and examined. No acute overnight events.   Reports poor sleep secondary to severe restless leg syndrome, which has worsened since admission.   She reports she spoke with a specialist who recommended taking iron supplements with orange juice every night.   She also reports having diarrhea/soft stools for the past week that can be annoying due to having to get up and apply her brace constantly.  No other concerns.     ___________________________________________________________________________    CT Lumbar Spine w/wo IV Cont (11.09.24 @ 13:55)  IMPRESSION: Long segment spinal fusion as described. Improved paraspinal collections. No retained drain fragment identified.    ___________________________________________________________________________    VITALS  T(C): 36.4 (11-12-24 @ 19:59), Max: 36.8 (11-12-24 @ 08:13)  HR: 85 (11-12-24 @ 19:59) (85 - 99)  BP: 135/77 (11-12-24 @ 19:59) (133/88 - 149/79)  RR: 16 (11-12-24 @ 19:59) (16 - 17)  SpO2: 95% (11-12-24 @ 19:59) (95% - 96%)  ___________________________________________________________________________    LABS                          9.5    4.75  )-----------( 410      ( 11 Nov 2024 06:50 )             31.1       11-11    139  |  105  |  13  ----------------------------<  110[H]  3.4[L]   |  24  |  0.80    Ca    8.8      11 Nov 2024 06:50    TPro  7.0  /  Alb  2.9[L]  /  TBili  0.4  /  DBili  x   /  AST  21  /  ALT  17  /  AlkPhos  183[H]  11-11    ___________________________________________________________________________    MEDICATIONS  (STANDING):  acetaminophen     Tablet .. 975 milliGRAM(s) Oral every 8 hours  apixaban 5 milliGRAM(s) Oral every 12 hours  atorvastatin 40 milliGRAM(s) Oral at bedtime  ceFAZolin   IVPB 2000 milliGRAM(s) IV Intermittent every 8 hours  chlorhexidine 2% Cloths 1 Application(s) Topical daily  cyclobenzaprine 10 milliGRAM(s) Oral three times a day  diclofenac sodium 1% Gel 2 Gram(s) Topical two times a day  DULoxetine 20 milliGRAM(s) Oral daily  ergocalciferol 62260 Unit(s) Oral <User Schedule>  erythromycin    ethylsuccinate Suspension 40 mG/mL 128 milliGRAM(s) Oral every 12 hours  ferrous    sulfate 325 milliGRAM(s) Oral at bedtime  lactobacillus acidophilus 1 Tablet(s) Oral every 12 hours  metoprolol tartrate 50 milliGRAM(s) Oral two times a day  montelukast 10 milliGRAM(s) Oral daily  multivitamin 1 Tablet(s) Oral daily  pramipexole 0.75 milliGRAM(s) Oral <User Schedule>  pregabalin 100 milliGRAM(s) Oral every 8 hours  senna 1 Tablet(s) Oral at bedtime  Vonoprazan (Voquezna) 10mg 1 Tablet(s),Vonoprazan (Voquezna) 10mg 1 Tablet(s) 1 Tablet(s) Oral <User Schedule>    MEDICATIONS  (PRN):  benzocaine/menthol Lozenge 1 Lozenge Oral every 3 hours PRN Mouth Sores  diphenhydrAMINE 25 milliGRAM(s) Oral two times a day PRN Rash and/or Itching  HYDROmorphone   Tablet 4 milliGRAM(s) Oral every 8 hours PRN Severe Pain (7 - 10)  melatonin 6 milliGRAM(s) Oral at bedtime PRN Insomnia    ___________________________________________________________________________    PHYSICAL EXAM:    Constitutional - NAD, Comfortable  Chest - good chest expansion, good respiratory effort  Cardio - warm and well perfused  Extremities - No peripheral edema, No calf tenderness   Neurologic Exam:                    Cognitive -             Orientation: Awake, A&O to self, place, date, year, situation     Speech - Fluent, Comprehensible, No dysarthria, No aphasia        Sensory - Intact to LT bilateral LE, but diminished  Psychiatric - Mood stable, Affect WNL      ___________________________________________________________________________

## 2024-11-13 ENCOUNTER — RX RENEWAL (OUTPATIENT)
Age: 74
End: 2024-11-13

## 2024-11-13 PROCEDURE — 99232 SBSQ HOSP IP/OBS MODERATE 35: CPT

## 2024-11-13 RX ADMIN — ACETAMINOPHEN 500MG 975 MILLIGRAM(S): 500 TABLET, COATED ORAL at 14:51

## 2024-11-13 RX ADMIN — Medication 100 MILLIGRAM(S): at 05:17

## 2024-11-13 RX ADMIN — ACETAMINOPHEN 500MG 975 MILLIGRAM(S): 500 TABLET, COATED ORAL at 23:10

## 2024-11-13 RX ADMIN — Medication 10 MILLIGRAM(S): at 14:20

## 2024-11-13 RX ADMIN — PREGABALIN 100 MILLIGRAM(S): 75 CAPSULE ORAL at 05:19

## 2024-11-13 RX ADMIN — METOPROLOL TARTRATE 50 MILLIGRAM(S): 100 TABLET, FILM COATED ORAL at 17:14

## 2024-11-13 RX ADMIN — ACETAMINOPHEN 500MG 975 MILLIGRAM(S): 500 TABLET, COATED ORAL at 05:48

## 2024-11-13 RX ADMIN — Medication 10 MILLIGRAM(S): at 23:10

## 2024-11-13 RX ADMIN — ACETAMINOPHEN 500MG 975 MILLIGRAM(S): 500 TABLET, COATED ORAL at 05:18

## 2024-11-13 RX ADMIN — METOPROLOL TARTRATE 50 MILLIGRAM(S): 100 TABLET, FILM COATED ORAL at 05:18

## 2024-11-13 RX ADMIN — Medication 100 MILLIGRAM(S): at 14:20

## 2024-11-13 RX ADMIN — PRAMIPEXOLE 0.75 MILLIGRAM(S): 1.5 TABLET, EXTENDED RELEASE ORAL at 14:44

## 2024-11-13 RX ADMIN — DICLOFENAC SODIUM 2 GRAM(S): 20 SOLUTION TOPICAL at 05:19

## 2024-11-13 RX ADMIN — DICLOFENAC SODIUM 2 GRAM(S): 20 SOLUTION TOPICAL at 17:15

## 2024-11-13 RX ADMIN — Medication 100 MILLIGRAM(S): at 23:11

## 2024-11-13 RX ADMIN — Medication 25 MILLIGRAM(S): at 23:37

## 2024-11-13 RX ADMIN — Medication 1 TABLET(S): at 23:10

## 2024-11-13 RX ADMIN — Medication 325 MILLIGRAM(S): at 23:11

## 2024-11-13 RX ADMIN — APIXABAN 5 MILLIGRAM(S): 2.5 TABLET, FILM COATED ORAL at 05:18

## 2024-11-13 RX ADMIN — ACETAMINOPHEN 500MG 975 MILLIGRAM(S): 500 TABLET, COATED ORAL at 14:21

## 2024-11-13 RX ADMIN — PREGABALIN 100 MILLIGRAM(S): 75 CAPSULE ORAL at 14:19

## 2024-11-13 RX ADMIN — CHLORHEXIDINE GLUCONATE 1 APPLICATION(S): 1.2 RINSE ORAL at 14:44

## 2024-11-13 RX ADMIN — ACETAMINOPHEN 500MG 975 MILLIGRAM(S): 500 TABLET, COATED ORAL at 23:40

## 2024-11-13 RX ADMIN — Medication 40 MILLIGRAM(S): at 23:11

## 2024-11-13 RX ADMIN — Medication 1 TABLET(S): at 17:14

## 2024-11-13 RX ADMIN — Medication 1 TABLET(S): at 05:18

## 2024-11-13 RX ADMIN — Medication 20 MILLIGRAM(S): at 14:21

## 2024-11-13 RX ADMIN — APIXABAN 5 MILLIGRAM(S): 2.5 TABLET, FILM COATED ORAL at 17:13

## 2024-11-13 RX ADMIN — Medication 1 TABLET(S): at 14:21

## 2024-11-13 RX ADMIN — Medication 10 MILLIGRAM(S): at 05:18

## 2024-11-13 RX ADMIN — PREGABALIN 100 MILLIGRAM(S): 75 CAPSULE ORAL at 23:11

## 2024-11-13 RX ADMIN — MONTELUKAST SODIUM 10 MILLIGRAM(S): 10 TABLET ORAL at 14:21

## 2024-11-13 NOTE — PROGRESS NOTE ADULT - SUBJECTIVE AND OBJECTIVE BOX
HPI:  Mrs. Haley French is a 74-year-old female patient with past medical history of OA, HTN, gastroparesis, peripheral neuropathy, multiple prior thoracolumbar spine surgeries for congenital scoliosis [place] >[place]/ removal of hardware (1991) who presented to [facility] on 9/28/24 with back pain and left hip pain s/p mechanical fall. CT imaging was performed and reported a T12 three column spine fracture-malalignment with fracture extending to the adjacent right 12th posterior rib and left T12-L1 facet joint and acute, displaced fracture of the left eighth lateral rib. Chronic appearing bilateral rib deformities. Surgical management was recommended and she is S/P T9-L3 open fusion with PSO/lag screw partial corpectomy/interbody by Dr. Stroud, Neurosurgeon with complex Plastic Surgery closure by Dr. Elias on 9/29/24. Post op course c/b CSK leak s/p repair, new onset paroxsymal Afib with RVR,  increased pain and drainage from surgical incision, development of pleural effusions, symptomatic orthostatic hypotension, urinary retention, post op anemia, and chest pain with spontaneous resolution. Cardiac work up negative. Patient was evaluated by PM&R and therapy for functional deficits, gait/ADL impairments and acute rehabilitation was recommended. Patient was cleared for discharge to [facility] IRF on 10/16/24.    Her admission was remarkable for a developing a fever of 103 with tachycardia and subjective fever/chills. Sepsis w/up was initiated and found to have small right upper lobe infiltrate, UTI, and MSSA bacteremia. CTH negative. Hospitalist and ID were consulted and per G+ bacteremia guidelines was recommended completing infectious workup, which includes MICHAEL which is not available at [facility]. Patient also required PICC line placement for long term ABx and a transfer request initiated from [place] to [facility] for MICHAEL and completion of bacteremia workup/treatment. All other medical co-morbidities were stable. Patient was deemed medically stable for discharge to [facility] medicine on 10/21/24 and was subsequently transferred on 10/23/24 for MICHAEL, advanced spine imaging, and further management. She was found to have a paraspinal abscess on MR imaging and is s/p paraspinal collection aspiration on 10/25/24. Culture grew gram positive cocci in pairs, with rare staph aureus. Neurosurgery advised and no acute surgical revision was deemed necessary. Repeat BCx was negative. Recommendation to continue long-term IV cefazolin via PICC until 12/12/24 (6 wks) with eventual transition to PO Duricef long term/indefinitely per ID. MICHAEL was negative for valvular vegetations but did reveal incidental PFO. Hospital course was significant for recurrent fevers, A fib (resolved) with source control and electrolyte correction, initiated on DOAc given elevated [id number]. Patient was evaluated by PM&R and therapy for functional deficits, gait/ADL impairments and acute rehabilitation was recommended. Patient was cleared for discharge to [facility] IRF on 10/30/24.   (30 Oct 2024 13:05)    TDD: 11/20/24 to Home  ___________________________________________________________________________    SUBJECTIVE/ROS:   Patient seen and examined. No acute overnight events.   Reports poor sleep secondary to severe restless leg syndrome, which has worsened since admission.   She reports she spoke with a specialist who recommended taking iron supplements with orange juice every night.   She also reports having diarrhea/soft stools for the past week that can be annoying due to having to get up and apply her brace constantly.  No other concerns.     ___________________________________________________________________________    CT Lumbar Spine w/wo IV Cont (11.09.24 @ 13:55)  IMPRESSION: Long segment spinal fusion as described. Improved paraspinal collections. No retained drain fragment identified.    ___________________________________________________________________________    Vital Signs Last 24 Hrs  T(C): 36.4 (12 Nov 2024 19:59), Max: 36.8 (12 Nov 2024 08:13)  T(F): 97.6 (12 Nov 2024 19:59), Max: 98.3 (12 Nov 2024 08:13)  HR: 92 (13 Nov 2024 05:10) (85 - 99)  BP: 138/70 (13 Nov 2024 05:10) (133/88 - 149/79)  RR: 16 (13 Nov 2024 05:10) (16 - 17)  SpO2: 95% (13 Nov 2024 05:10) (95% - 96%)    ___________________________________________________________________________    LAB                        9.5    4.75  )-----------( 410      ( 11 Nov 2024 06:50 )             31.1       11-11    139  |  105  |  13  ----------------------------<  110[H]  3.4[L]   |  24  |  0.80    Ca    8.8      11 Nov 2024 06:50    TPro  7.0  /  Alb  2.9[L]  /  TBili  0.4  /  DBili  x   /  AST  21  /  ALT  17  /  AlkPhos  183[H]  11-11    ___________________________________________________________________________    MEDICATIONS  (STANDING):  acetaminophen     Tablet .. 975 milliGRAM(s) Oral every 8 hours  apixaban 5 milliGRAM(s) Oral every 12 hours  atorvastatin 40 milliGRAM(s) Oral at bedtime  ceFAZolin   IVPB 2000 milliGRAM(s) IV Intermittent every 8 hours  chlorhexidine 2% Cloths 1 Application(s) Topical daily  cyclobenzaprine 10 milliGRAM(s) Oral three times a day  diclofenac sodium 1% Gel 2 Gram(s) Topical two times a day  DULoxetine 20 milliGRAM(s) Oral daily  ergocalciferol 26655 Unit(s) Oral <User Schedule>  erythromycin    ethylsuccinate Suspension 40 mG/mL 128 milliGRAM(s) Oral every 12 hours  ferrous    sulfate 325 milliGRAM(s) Oral at bedtime  lactobacillus acidophilus 1 Tablet(s) Oral every 12 hours  metoprolol tartrate 50 milliGRAM(s) Oral two times a day  montelukast 10 milliGRAM(s) Oral daily  multivitamin 1 Tablet(s) Oral daily  pramipexole 0.75 milliGRAM(s) Oral <User Schedule>  pregabalin 100 milliGRAM(s) Oral every 8 hours  senna 1 Tablet(s) Oral at bedtime  Vonoprazan (Voquezna) 10mg 1 Tablet(s),Vonoprazan (Voquezna) 10mg 1 Tablet(s) 1 Tablet(s) Oral <User Schedule>    MEDICATIONS  (PRN):  benzocaine/menthol Lozenge 1 Lozenge Oral every 3 hours PRN Mouth Sores  diphenhydrAMINE 25 milliGRAM(s) Oral two times a day PRN Rash and/or Itching  HYDROmorphone   Tablet 4 milliGRAM(s) Oral every 8 hours PRN Severe Pain (7 - 10)  melatonin 6 milliGRAM(s) Oral at bedtime PRN Insomnia    ___________________________________________________________________________    PHYSICAL EXAM:    Constitutional - NAD, Comfortable  Chest - good chest expansion, good respiratory effort  Cardio - warm and well perfused  Extremities - No peripheral edema, No calf tenderness   Neuro-     Cognitive - AAOx3, follows command     Motor -                     LEFT    UE - ShAB 4/5, EF 5/5, EE 5/5, WE 5/5,  5/5                    RIGHT UE - ShAB 4/5, EF 5/5, EE 5/5, WE 5/5,  5/5                    LEFT    LE - HF 4/5, KE 5/5, DF 5/5, PF 5/5                    RIGHT LE - HF 4/5, KE 5/5, DF 5/5, PF 5/5        Sensory - Intact to LT in UEs and LEs B/L      Reflexes - DTRs Intact     Coordination - FTN intact     Sensory - Intact to LT bilateral LE, but diminished  Psychiatric - Mood stable, Affect WNL      ___________________________________________________________________________ HPI:  Mrs. Haley French is a 74-year-old female patient with past medical history of OA, HTN, gastroparesis, peripheral neuropathy, multiple prior thoracolumbar spine surgeries for congenital scoliosis done >10 years ago out of [place] w/ removal of hardware (1991) who presented to [facility] on 9/28/24 with back pain and left hip pain s/p mechanical fall. CT imaging was performed and reported a T12 three column spine fracture-malalignment with fracture extending to the adjacent right 12th posterior rib and left T12-L1 facet joint and acute, displaced fracture of the left eighth lateral rib. Chronic appearing bilateral rib deformities. Surgical management was recommended and she is S/P T9-L3 open fusion with PSO/lag screw partial corpectomy/interbody by Dr. Stroud, Neurosurgeon with complex Plastic Surgery closure by Dr. Elias on 9/29/24. Post op course c/b CSK leak s/p repair, new onset paroxsymal Afib with RVR,  increased pain and drainage from surgical incision, development of pleural effusions, symptomatic orthostatic hypotension, urinary retention, post op anemia, and chest pain with spontaneous resolution. Cardiac work up negative. Patient was evaluated by PM&R and therapy for functional deficits, gait/ADL impairments and acute rehabilitation was recommended. Patient was cleared for discharge to [facility] IRF on 10/16/24.    Her admission was remarkable for a developing a fever of 103 with tachycardia and subjective fever/chills. Sepsis w/up was initiated and found to have small right upper lobe infiltrate, UTI, and MSSA bacteremia. CTH negative. Hospitalist and ID were consulted and per G+ bacteremia guidelines was recommended completing infectious workup, which includes MICHAEL which is not available at [facility]. Patient also required PICC line placement for long term ABx and a transfer request initiated from [place] to [facility] for MICHAEL and completion of bacteremia workup/treatment. All other medical co-morbidities were stable. Patient was deemed medically stable for discharge to [facility] medicine on 10/21/24 and was subsequently transferred on 10/23/24 for MICHAEL, advanced spine imaging, and further management. She was found to have a paraspinal abscess on MR imaging and is s/p paraspinal collection aspiration on 10/25/24. Culture grew gram positive cocci in pairs, with rare staph aureus. Neurosurgery advised and no acute surgical revision was deemed necessary. Repeat BCx was negative. Recommendation to continue long-term IV cefazolin via PICC until 12/12/24 (6 wks) with eventual transition to PO Duricef long term/indefinitely per ID. MICHAEL was negative for valvular vegetations but did reveal incidental PFO. Hospital course was significant for recurrent fevers, A fib (resolved) with source control and electrolyte correction, initiated on DOAc given elevated [id number]. Patient was evaluated by PM&R and therapy for functional deficits, gait/ADL impairments and acute rehabilitation was recommended. Patient was cleared for discharge to [facility] IRF on 10/30/24.   (30 Oct 2024 13:05)    TDD: 11/20/24 to Home  ___________________________________________________________________________    SUBJECTIVE/ROS:   Patient seen and examined in room and seen in gym with TLSO on. No acute overnight events.   Reports poor sleep secondary to severe restless leg syndrome, which has worsened since admission.   Pt started Ferrous sulfate 325mg at night. She reports she spoke with a specialist who recommended taking iron supplements with orange juice every night.   Pt denies Bm today thus far.   No other concerns.     ___________________________________________________________________________    CT Lumbar Spine w/wo IV Cont (11.09.24 @ 13:55)  IMPRESSION: Long segment spinal fusion as described. Improved paraspinal collections. No retained drain fragment identified.    ___________________________________________________________________________    Vital Signs Last 24 Hrs  T(C): 36.4 (12 Nov 2024 19:59), Max: 36.8 (12 Nov 2024 08:13)  T(F): 97.6 (12 Nov 2024 19:59), Max: 98.3 (12 Nov 2024 08:13)  HR: 92 (13 Nov 2024 05:10) (85 - 99)  BP: 138/70 (13 Nov 2024 05:10) (133/88 - 149/79)  RR: 16 (13 Nov 2024 05:10) (16 - 17)  SpO2: 95% (13 Nov 2024 05:10) (95% - 96%)    ___________________________________________________________________________    LAB                        9.5    4.75  )-----------( 410      ( 11 Nov 2024 06:50 )             31.1       11-11    139  |  105  |  13  ----------------------------<  110[H]  3.4[L]   |  24  |  0.80    Ca    8.8      11 Nov 2024 06:50    TPro  7.0  /  Alb  2.9[L]  /  TBili  0.4  /  DBili  x   /  AST  21  /  ALT  17  /  AlkPhos  183[H]  11-11    ___________________________________________________________________________    MEDICATIONS  (STANDING):  acetaminophen     Tablet .. 975 milliGRAM(s) Oral every 8 hours  apixaban 5 milliGRAM(s) Oral every 12 hours  atorvastatin 40 milliGRAM(s) Oral at bedtime  ceFAZolin   IVPB 2000 milliGRAM(s) IV Intermittent every 8 hours  chlorhexidine 2% Cloths 1 Application(s) Topical daily  cyclobenzaprine 10 milliGRAM(s) Oral three times a day  diclofenac sodium 1% Gel 2 Gram(s) Topical two times a day  DULoxetine 20 milliGRAM(s) Oral daily  ergocalciferol 99073 Unit(s) Oral <User Schedule>  erythromycin    ethylsuccinate Suspension 40 mG/mL 128 milliGRAM(s) Oral every 12 hours  ferrous    sulfate 325 milliGRAM(s) Oral at bedtime  lactobacillus acidophilus 1 Tablet(s) Oral every 12 hours  metoprolol tartrate 50 milliGRAM(s) Oral two times a day  montelukast 10 milliGRAM(s) Oral daily  multivitamin 1 Tablet(s) Oral daily  pramipexole 0.75 milliGRAM(s) Oral <User Schedule>  pregabalin 100 milliGRAM(s) Oral every 8 hours  senna 1 Tablet(s) Oral at bedtime  Vonoprazan (Voquezna) 10mg 1 Tablet(s),Vonoprazan (Voquezna) 10mg 1 Tablet(s) 1 Tablet(s) Oral <User Schedule>    MEDICATIONS  (PRN):  benzocaine/menthol Lozenge 1 Lozenge Oral every 3 hours PRN Mouth Sores  diphenhydrAMINE 25 milliGRAM(s) Oral two times a day PRN Rash and/or Itching  HYDROmorphone   Tablet 4 milliGRAM(s) Oral every 8 hours PRN Severe Pain (7 - 10)  melatonin 6 milliGRAM(s) Oral at bedtime PRN Insomnia    ___________________________________________________________________________    PHYSICAL EXAM:    Constitutional - NAD, Comfortable  Chest - good chest expansion, good respiratory effort  Cardio - warm and well perfused  Extremities - No peripheral edema, No calf tenderness   Neuro-     Cognitive - AAOx3, follows command     Motor -                     LEFT    UE - ShAB 4/5, EF 5/5, EE 5/5, WE 5/5,  5/5                    RIGHT UE - ShAB 4/5, EF 5/5, EE 5/5, WE 5/5,  5/5                    LEFT    LE - HF 4/5, KE 5/5, DF 5/5, PF 5/5                    RIGHT LE - HF 4/5, KE 5/5, DF 5/5, PF 5/5        Sensory - Intact to LT in UEs and LEs B/L      Reflexes - DTRs Intact     Coordination - FTN intact     Sensory - Intact to LT bilateral LE, but diminished  Psychiatric - Mood stable, Affect WNL      ___________________________________________________________________________ HPI:  Mrs. Haley rFench is a 74-year-old female patient with past medical history of OA, HTN, gastroparesis, peripheral neuropathy, multiple prior thoracolumbar spine surgeries for congenital scoliosis done >[place] years ago [place] of [place] w/ removal of hardware (1991) who presented to [facility] on 9/28/24 with back pain and left hip pain s/p mechanical fall. CT imaging was performed and reported a T12 three column spine fracture-malalignment with fracture extending to the adjacent right 12th posterior rib and left T12-L1 facet joint and acute, displaced fracture of the left eighth lateral rib. Chronic appearing bilateral rib deformities. Surgical management was recommended and she is S/P T9-L3 open fusion with PSO/lag screw partial corpectomy/interbody by Dr. Stroud, Neurosurgeon with complex Plastic Surgery closure by Dr. Elias on 9/29/24. Post op course c/b CSK leak s/p repair, new onset paroxsymal Afib with RVR,  increased pain and drainage from surgical incision, development of pleural effusions, symptomatic orthostatic hypotension, urinary retention, post op anemia, and chest pain with spontaneous resolution. Cardiac work up negative. Patient was evaluated by PM&R and therapy for functional deficits, gait/ADL impairments and acute rehabilitation was recommended. Patient was cleared for discharge to [facility] IRF on 10/16/24.    Her admission was remarkable for a developing a fever of 103 with tachycardia and subjective fever/chills. Sepsis w/up was initiated and found to have small right upper lobe infiltrate, UTI, and MSSA bacteremia. CTH negative. Hospitalist and ID were consulted and per G+ bacteremia guidelines was recommended completing infectious workup, which includes MICHAEL which is not available at [facility]. Patient also required PICC line placement for long term ABx and a transfer request initiated from [place] to [facility] for MICHAEL and completion of bacteremia workup/treatment. All other medical co-morbidities were stable. Patient was deemed medically stable for discharge to [facility] medicine on 10/21/24 and was subsequently transferred on 10/23/24 for MICHAEL, advanced spine imaging, and further management. She was found to have a paraspinal abscess on MR imaging and is s/p paraspinal collection aspiration on 10/25/24. Culture grew gram positive cocci in pairs, with rare staph aureus. Neurosurgery advised and no acute surgical revision was deemed necessary. Repeat BCx was negative. Recommendation to continue long-term IV cefazolin via PICC until 12/12/24 (6 wks) with eventual transition to PO Duricef long term/indefinitely per ID. MICHAEL was negative for valvular vegetations but did reveal incidental PFO. Hospital course was significant for recurrent fevers, A fib (resolved) with source control and electrolyte correction, initiated on DOAc given elevated [id number]. Patient was evaluated by PM&R and therapy for functional deficits, gait/ADL impairments and acute rehabilitation was recommended. Patient was cleared for discharge to [facility] IRF on 10/30/24.   (30 Oct 2024 13:05)    TDD: 11/20/24 to Home  ___________________________________________________________________________    SUBJECTIVE/ROS:   Patient seen and examined in room and seen in gym with TLSO on. No acute overnight events.   Reports poor sleep secondary to severe restless leg syndrome, which has worsened since admission.   Cleared for stand pivot from bed to WC Mod I with RW after discussion with PT.  Pt started Ferrous sulfate 325mg at night. She reports she spoke with a specialist who recommended taking iron supplements with orange juice every night.   Pt denies Bm today thus far.   No other concerns. Case to be reevaluated at Interdisciplinary Team meeting tomorrow.    ___________________________________________________________________________    CT Lumbar Spine w/wo IV Cont (11.09.24 @ 13:55)  IMPRESSION: Long segment spinal fusion as described. Improved paraspinal collections. No retained drain fragment identified.    ___________________________________________________________________________    Vital Signs Last 24 Hrs  T(C): 36.4 (12 Nov 2024 19:59), Max: 36.8 (12 Nov 2024 08:13)  T(F): 97.6 (12 Nov 2024 19:59), Max: 98.3 (12 Nov 2024 08:13)  HR: 92 (13 Nov 2024 05:10) (85 - 99)  BP: 138/70 (13 Nov 2024 05:10) (133/88 - 149/79)  RR: 16 (13 Nov 2024 05:10) (16 - 17)  SpO2: 95% (13 Nov 2024 05:10) (95% - 96%)    ___________________________________________________________________________    LAB                        9.5    4.75  )-----------( 410      ( 11 Nov 2024 06:50 )             31.1       11-11    139  |  105  |  13  ----------------------------<  110[H]  3.4[L]   |  24  |  0.80    Ca    8.8      11 Nov 2024 06:50    TPro  7.0  /  Alb  2.9[L]  /  TBili  0.4  /  DBili  x   /  AST  21  /  ALT  17  /  AlkPhos  183[H]  11-11    ___________________________________________________________________________    MEDICATIONS  (STANDING):  acetaminophen     Tablet .. 975 milliGRAM(s) Oral every 8 hours  apixaban 5 milliGRAM(s) Oral every 12 hours  atorvastatin 40 milliGRAM(s) Oral at bedtime  ceFAZolin   IVPB 2000 milliGRAM(s) IV Intermittent every 8 hours  chlorhexidine 2% Cloths 1 Application(s) Topical daily  cyclobenzaprine 10 milliGRAM(s) Oral three times a day  diclofenac sodium 1% Gel 2 Gram(s) Topical two times a day  DULoxetine 20 milliGRAM(s) Oral daily  ergocalciferol 75031 Unit(s) Oral <User Schedule>  erythromycin    ethylsuccinate Suspension 40 mG/mL 128 milliGRAM(s) Oral every 12 hours  ferrous    sulfate 325 milliGRAM(s) Oral at bedtime  lactobacillus acidophilus 1 Tablet(s) Oral every 12 hours  metoprolol tartrate 50 milliGRAM(s) Oral two times a day  montelukast 10 milliGRAM(s) Oral daily  multivitamin 1 Tablet(s) Oral daily  pramipexole 0.75 milliGRAM(s) Oral <User Schedule>  pregabalin 100 milliGRAM(s) Oral every 8 hours  senna 1 Tablet(s) Oral at bedtime  Vonoprazan (Voquezna) 10mg 1 Tablet(s),Vonoprazan (Voquezna) 10mg 1 Tablet(s) 1 Tablet(s) Oral <User Schedule>    MEDICATIONS  (PRN):  benzocaine/menthol Lozenge 1 Lozenge Oral every 3 hours PRN Mouth Sores  diphenhydrAMINE 25 milliGRAM(s) Oral two times a day PRN Rash and/or Itching  HYDROmorphone   Tablet 4 milliGRAM(s) Oral every 8 hours PRN Severe Pain (7 - 10)  melatonin 6 milliGRAM(s) Oral at bedtime PRN Insomnia    ___________________________________________________________________________    PHYSICAL EXAM:    Constitutional - NAD, Comfortable  Chest - good chest expansion, good respiratory effort  Cardio - warm and well perfused  Extremities - No peripheral edema, No calf tenderness   Neuro-     Cognitive - AAOx3, follows command     Motor -                     LEFT    UE - ShAB 4/5, EF 5/5, EE 5/5, WE 5/5,  5/5                    RIGHT UE - ShAB 4/5, EF 5/5, EE 5/5, WE 5/5,  5/5                    LEFT    LE - HF 4/5, KE 5/5, DF 5/5, PF 5/5                    RIGHT LE - HF 4/5, KE 5/5, DF 5/5, PF 5/5        Sensory - Intact to LT in UEs and LEs B/L      Reflexes - DTRs Intact     Coordination - FTN intact     Sensory - Intact to LT bilateral LE, but diminished  Psychiatric - Mood stable, Affect WNL      ___________________________________________________________________________

## 2024-11-13 NOTE — PROGRESS NOTE ADULT - ASSESSMENT
74 year old female with PMHx of OA, HTN, gastroparesis, peripheral neuropathy, multiple prior thoracolumbar spine surgeries for congenital scoliosis [place]/ [place] of [place] (1991) and recent fall with T11-T12 fracture, s/p thoracolumbar posterior fusion for t spine fx with plastics closure on  9/29 with Dr. Stroud and Dr. Funk, c/b post op csf leak, new onset paroxysmal Afib with RVR, increased post op pain and drainage from incision site, pleural effusions, symptomatic orthostatic hypotension, urinary retention, post op anemia, and CP with spontaneous resolution. Patient was sent to [facility] rehab on 10/17, c/b AMS, sepsis with high fever, rigors, tachycardia on 10/20 found to have MSSA bacteremia with right upper lobe infiltrate, and UTI. CT spine no report of collection. Urine +ve E coli, transferred back to [facility] on 10/23 for MICHAEL, advanced spine imaging, and further management, found to have paraspinal abscess on MR imaging, S/p paraspinal collection aspiration on 10/25, culture growing gram positive cocci in pairs, with rare staph aureus. Neurosurgery advised no acute surgical revision necessary, repeat BCx negative. To continue long-term IV cefazolin via PICC until 12/12/24 (6 wks) with eventual transition to PO Duricef long term/indefinitely as per ID. MICHAEL was negative for valvular vegetations but did reveal incidental PFO. Hospital course significant for recurrent fevers, A fib, started AC. Now at [facility] for rehab.     Traumatic T9-L3 Vertebral Fracture s/p Corpectomy and Fusion Surgery 9/29  MSSA Bacteremia 2/2 Paraspinal Abscess   - 10/20 Bcx MSSA, repeat blood cultures 10/21 and 10/23 negative   -  MRI: rim-enhancing heterogeneous fluid collection suspicious for an abscess; culture growing GPC in pairs and rare staph aureus   - TTE: no evidence of vegetations  - No Acute surgical intervention per neurosurgery   - s/p paraspinal collection aspiration 10/25 with Dr. Guerra, fluid culture grew MSSA  - continue Cefazolin 2g q8h via PICC until 12/12, then start Duricef 500mg bid indefinitely   - Weekly CBC, BMP, ESR and CRP  - TLSO when OOB   - Pain control and bowel regimen per rehab team   - RADHA drain dislodeged on 11/9, CT T/L Spine 11/9 showed Long segment spinal fusion as described. Improved paraspinal collections. No retained drain fragment identified.  - Continue comprehensive rehab program with PT/OT  - Outpatient follow up with spine surgeon     Hypertension  Orthostasis   - Continue metoprolol w/ holding paramters  - Continue to hold chlorthalidone and losartan   - Monitor BP    HLD  -Continue atorvastatin     Paroxysmal A-fib  -Continue Eliquis AC   -Continue lopressor 50mg bid    Post-Op Anemia  -H/H stable  -Started on ferrous sulfate   -Monitor CBC     Gastritis  Gastroparesis   - Continue vonoprazan and erythromycin susp    RLS  -Continue pramipexole     Seasonal Allergies  -Continue montelukast     Hypokalemia   -s/p repletion   -Monitor BMP    Loose Stool Likely 2/2 Laxatives   -Miralax discontinued and senna reduced to 1 tab QHS per primary team.   -Will continue to monitor.    DVT Prophylaxis:  - Eliquis

## 2024-11-13 NOTE — PROGRESS NOTE ADULT - SUBJECTIVE AND OBJECTIVE BOX
PROGRESS NOTE:     Patient is a 74y old  Female who presents with a chief complaint of Traumatic T9-L3 vertebral fracture s/p corpectomy and fusion surgery c/b MSSA Bacteremia 2/2 paraspinal abscess (13 Nov 2024 07:20)          SUBJECTIVE & OBJECTIVE:   Pt seen and examined at bedside in AM    no overnight events.       REVIEW OF SYSTEMS: remaining ROS negative     PHYSICAL EXAM:  VITALS:  Vital Signs Last 24 Hrs  T(C): 36.4 (12 Nov 2024 19:59), Max: 36.4 (12 Nov 2024 19:59)  T(F): 97.6 (12 Nov 2024 19:59), Max: 97.6 (12 Nov 2024 19:59)  HR: 92 (13 Nov 2024 05:10) (85 - 99)  BP: 138/70 (13 Nov 2024 05:10) (133/88 - 138/70)  BP(mean): --  RR: 16 (13 Nov 2024 05:10) (16 - 16)  SpO2: 95% (13 Nov 2024 05:10) (95% - 96%)    Parameters below as of 13 Nov 2024 05:10  Patient On (Oxygen Delivery Method): room air          GENERAL: NAD,  no increased WOB  HEAD:  Atraumatic, Normocephalic  EYES: EOMI, conjunctiva and sclera clear  ENMT: Moist mucous membranes  NECK: Supple, No JVD  NERVOUS SYSTEM:  Alert & Oriented   CHEST/LUNG: Clear to auscultation bilaterally; No rales, rhonchi, wheezing. back brace in place  HEART: Regular rate and rhythm  ABDOMEN: Soft, Nontender, Nondistended; Bowel sounds present  EXTREMITIES:  No clubbing, cyanosis, calf tenderness or edema b/l      MEDICATIONS  (STANDING):  acetaminophen     Tablet .. 975 milliGRAM(s) Oral every 8 hours  apixaban 5 milliGRAM(s) Oral every 12 hours  atorvastatin 40 milliGRAM(s) Oral at bedtime  ceFAZolin   IVPB 2000 milliGRAM(s) IV Intermittent every 8 hours  chlorhexidine 2% Cloths 1 Application(s) Topical daily  cyclobenzaprine 10 milliGRAM(s) Oral three times a day  diclofenac sodium 1% Gel 2 Gram(s) Topical two times a day  DULoxetine 20 milliGRAM(s) Oral daily  ergocalciferol 13644 Unit(s) Oral <User Schedule>  erythromycin    ethylsuccinate Suspension 40 mG/mL 128 milliGRAM(s) Oral every 12 hours  ferrous    sulfate 325 milliGRAM(s) Oral at bedtime  lactobacillus acidophilus 1 Tablet(s) Oral every 12 hours  metoprolol tartrate 50 milliGRAM(s) Oral two times a day  montelukast 10 milliGRAM(s) Oral daily  multivitamin 1 Tablet(s) Oral daily  pramipexole 0.75 milliGRAM(s) Oral <User Schedule>  pregabalin 100 milliGRAM(s) Oral every 8 hours  senna 1 Tablet(s) Oral at bedtime  Vonoprazan (Voquezna) 10mg 1 Tablet(s),Vonoprazan (Voquezna) 10mg 1 Tablet(s) 1 Tablet(s) Oral <User Schedule>    MEDICATIONS  (PRN):  benzocaine/menthol Lozenge 1 Lozenge Oral every 3 hours PRN Mouth Sores  diphenhydrAMINE 25 milliGRAM(s) Oral two times a day PRN Rash and/or Itching  HYDROmorphone   Tablet 4 milliGRAM(s) Oral every 8 hours PRN Severe Pain (7 - 10)  melatonin 6 milliGRAM(s) Oral at bedtime PRN Insomnia      Allergies    Dilantin (Urticaria)  penicillins (Urticaria)    Intolerances    erythromycin (Stomach Upset; Vomiting; Nausea)          LABS:                        9.5    4.75  )-----------( 410      ( 11 Nov 2024 06:50 )             31.1       11-11    139  |  105  |  13  ----------------------------<  110  3.4   |  24  |  0.80    Ca    8.8      11 Nov 2024 06:50    TPro  7.0  /  Alb  2.9  /  TBili  0.4  /  DBili  x   /  AST  21  /  ALT  17  /  AlkPhos  183  11-11    Urinalysis Basic - ( 11 Nov 2024 06:50 )    Color: x / Appearance: x / SG: x / pH: x  Gluc: 110 mg/dL / Ketone: x  / Bili: x / Urobili: x   Blood: x / Protein: x / Nitrite: x   Leuk Esterase: x / RBC: x / WBC x   Sq Epi: x / Non Sq Epi: x / Bacteria: x    COVID-19 PCR: NotDetec (10-30-24 @ 21:45)  COVID-19 PCR: NotDetec (10-21-24 @ 09:20)  COVID-19 PCR: NotDetec (10-17-24 @ 16:34)    Consultant(s) Notes Reviewed:   Care Discused with Consultants/Other Providers:  Imaging Personally Reviewed:

## 2024-11-14 ENCOUNTER — TRANSCRIPTION ENCOUNTER (OUTPATIENT)
Age: 74
End: 2024-11-14

## 2024-11-14 LAB
ALBUMIN SERPL ELPH-MCNC: 3 G/DL — LOW (ref 3.3–5)
ALP SERPL-CCNC: 192 U/L — HIGH (ref 40–120)
ALT FLD-CCNC: 27 U/L — SIGNIFICANT CHANGE UP (ref 10–45)
ANION GAP SERPL CALC-SCNC: 11 MMOL/L — SIGNIFICANT CHANGE UP (ref 5–17)
AST SERPL-CCNC: 18 U/L — SIGNIFICANT CHANGE UP (ref 10–40)
BASOPHILS # BLD AUTO: 0.03 K/UL — SIGNIFICANT CHANGE UP (ref 0–0.2)
BASOPHILS NFR BLD AUTO: 0.6 % — SIGNIFICANT CHANGE UP (ref 0–2)
BILIRUB SERPL-MCNC: 0.3 MG/DL — SIGNIFICANT CHANGE UP (ref 0.2–1.2)
BUN SERPL-MCNC: 24 MG/DL — HIGH (ref 7–23)
CALCIUM SERPL-MCNC: 9.2 MG/DL — SIGNIFICANT CHANGE UP (ref 8.4–10.5)
CHLORIDE SERPL-SCNC: 104 MMOL/L — SIGNIFICANT CHANGE UP (ref 96–108)
CO2 SERPL-SCNC: 25 MMOL/L — SIGNIFICANT CHANGE UP (ref 22–31)
CREAT SERPL-MCNC: 0.82 MG/DL — SIGNIFICANT CHANGE UP (ref 0.5–1.3)
CRP SERPL-MCNC: 37 MG/L — HIGH
EGFR: 74 ML/MIN/1.73M2 — SIGNIFICANT CHANGE UP
EOSINOPHIL # BLD AUTO: 0.17 K/UL — SIGNIFICANT CHANGE UP (ref 0–0.5)
EOSINOPHIL NFR BLD AUTO: 3.6 % — SIGNIFICANT CHANGE UP (ref 0–6)
ERYTHROCYTE [SEDIMENTATION RATE] IN BLOOD: 87 MM/HR — HIGH (ref 0–20)
GLUCOSE SERPL-MCNC: 108 MG/DL — HIGH (ref 70–99)
HCT VFR BLD CALC: 31.9 % — LOW (ref 34.5–45)
HGB BLD-MCNC: 9.6 G/DL — LOW (ref 11.5–15.5)
IMM GRANULOCYTES NFR BLD AUTO: 0.4 % — SIGNIFICANT CHANGE UP (ref 0–0.9)
LYMPHOCYTES # BLD AUTO: 1.41 K/UL — SIGNIFICANT CHANGE UP (ref 1–3.3)
LYMPHOCYTES # BLD AUTO: 30.3 % — SIGNIFICANT CHANGE UP (ref 13–44)
MCHC RBC-ENTMCNC: 25.9 PG — LOW (ref 27–34)
MCHC RBC-ENTMCNC: 30.1 G/DL — LOW (ref 32–36)
MCV RBC AUTO: 86 FL — SIGNIFICANT CHANGE UP (ref 80–100)
MONOCYTES # BLD AUTO: 0.36 K/UL — SIGNIFICANT CHANGE UP (ref 0–0.9)
MONOCYTES NFR BLD AUTO: 7.7 % — SIGNIFICANT CHANGE UP (ref 2–14)
NEUTROPHILS # BLD AUTO: 2.67 K/UL — SIGNIFICANT CHANGE UP (ref 1.8–7.4)
NEUTROPHILS NFR BLD AUTO: 57.4 % — SIGNIFICANT CHANGE UP (ref 43–77)
NRBC # BLD: 0 /100 WBCS — SIGNIFICANT CHANGE UP (ref 0–0)
PLATELET # BLD AUTO: 406 K/UL — HIGH (ref 150–400)
POTASSIUM SERPL-MCNC: 3.9 MMOL/L — SIGNIFICANT CHANGE UP (ref 3.5–5.3)
POTASSIUM SERPL-SCNC: 3.9 MMOL/L — SIGNIFICANT CHANGE UP (ref 3.5–5.3)
PROT SERPL-MCNC: 7.3 G/DL — SIGNIFICANT CHANGE UP (ref 6–8.3)
RBC # BLD: 3.71 M/UL — LOW (ref 3.8–5.2)
RBC # FLD: 15.3 % — HIGH (ref 10.3–14.5)
SODIUM SERPL-SCNC: 140 MMOL/L — SIGNIFICANT CHANGE UP (ref 135–145)
WBC # BLD: 4.66 K/UL — SIGNIFICANT CHANGE UP (ref 3.8–10.5)
WBC # FLD AUTO: 4.66 K/UL — SIGNIFICANT CHANGE UP (ref 3.8–10.5)

## 2024-11-14 PROCEDURE — 99232 SBSQ HOSP IP/OBS MODERATE 35: CPT

## 2024-11-14 RX ORDER — CEFAZOLIN SODIUM 10 G
2 VIAL (EA) INJECTION
Qty: 126 | Refills: 0
Start: 2024-11-14 | End: 2024-12-04

## 2024-11-14 RX ADMIN — Medication 1 TABLET(S): at 12:39

## 2024-11-14 RX ADMIN — MONTELUKAST SODIUM 10 MILLIGRAM(S): 10 TABLET ORAL at 12:40

## 2024-11-14 RX ADMIN — Medication 1 TABLET(S): at 06:15

## 2024-11-14 RX ADMIN — APIXABAN 5 MILLIGRAM(S): 2.5 TABLET, FILM COATED ORAL at 06:15

## 2024-11-14 RX ADMIN — ACETAMINOPHEN 500MG 975 MILLIGRAM(S): 500 TABLET, COATED ORAL at 15:43

## 2024-11-14 RX ADMIN — METOPROLOL TARTRATE 50 MILLIGRAM(S): 100 TABLET, FILM COATED ORAL at 06:15

## 2024-11-14 RX ADMIN — PRAMIPEXOLE 0.75 MILLIGRAM(S): 1.5 TABLET, EXTENDED RELEASE ORAL at 14:42

## 2024-11-14 RX ADMIN — CHLORHEXIDINE GLUCONATE 1 APPLICATION(S): 1.2 RINSE ORAL at 12:39

## 2024-11-14 RX ADMIN — ACETAMINOPHEN 500MG 975 MILLIGRAM(S): 500 TABLET, COATED ORAL at 21:43

## 2024-11-14 RX ADMIN — APIXABAN 5 MILLIGRAM(S): 2.5 TABLET, FILM COATED ORAL at 18:48

## 2024-11-14 RX ADMIN — Medication 10 MILLIGRAM(S): at 14:42

## 2024-11-14 RX ADMIN — ACETAMINOPHEN 500MG 975 MILLIGRAM(S): 500 TABLET, COATED ORAL at 21:13

## 2024-11-14 RX ADMIN — ACETAMINOPHEN 500MG 975 MILLIGRAM(S): 500 TABLET, COATED ORAL at 14:43

## 2024-11-14 RX ADMIN — Medication 10 MILLIGRAM(S): at 21:13

## 2024-11-14 RX ADMIN — Medication 1 TABLET(S): at 18:48

## 2024-11-14 RX ADMIN — DICLOFENAC SODIUM 2 GRAM(S): 20 SOLUTION TOPICAL at 18:49

## 2024-11-14 RX ADMIN — Medication 40 MILLIGRAM(S): at 21:15

## 2024-11-14 RX ADMIN — PREGABALIN 100 MILLIGRAM(S): 75 CAPSULE ORAL at 14:44

## 2024-11-14 RX ADMIN — ACETAMINOPHEN 500MG 975 MILLIGRAM(S): 500 TABLET, COATED ORAL at 06:14

## 2024-11-14 RX ADMIN — Medication 100 MILLIGRAM(S): at 21:13

## 2024-11-14 RX ADMIN — Medication 100 MILLIGRAM(S): at 14:44

## 2024-11-14 RX ADMIN — Medication 325 MILLIGRAM(S): at 21:15

## 2024-11-14 RX ADMIN — Medication 1 TABLET(S): at 21:15

## 2024-11-14 RX ADMIN — HYDROMORPHONE HYDROCHLORIDE 4 MILLIGRAM(S): 2 TABLET ORAL at 20:50

## 2024-11-14 RX ADMIN — PREGABALIN 100 MILLIGRAM(S): 75 CAPSULE ORAL at 21:18

## 2024-11-14 RX ADMIN — ACETAMINOPHEN 500MG 975 MILLIGRAM(S): 500 TABLET, COATED ORAL at 06:44

## 2024-11-14 RX ADMIN — Medication 20 MILLIGRAM(S): at 12:40

## 2024-11-14 RX ADMIN — HYDROMORPHONE HYDROCHLORIDE 4 MILLIGRAM(S): 2 TABLET ORAL at 20:20

## 2024-11-14 RX ADMIN — Medication 10 MILLIGRAM(S): at 06:15

## 2024-11-14 RX ADMIN — PREGABALIN 100 MILLIGRAM(S): 75 CAPSULE ORAL at 06:13

## 2024-11-14 RX ADMIN — METOPROLOL TARTRATE 50 MILLIGRAM(S): 100 TABLET, FILM COATED ORAL at 18:46

## 2024-11-14 RX ADMIN — Medication 100 MILLIGRAM(S): at 06:13

## 2024-11-14 NOTE — PROGRESS NOTE ADULT - SUBJECTIVE AND OBJECTIVE BOX
PROGRESS NOTE:     Patient is a 74y old  Female who presents with a chief complaint of Traumatic T9-L3 vertebral fracture s/p corpectomy and fusion surgery c/b MSSA Bacteremia 2/2 paraspinal abscess (13 Nov 2024 07:20)          SUBJECTIVE & OBJECTIVE:   Pt seen and examined at bedside in AM    no overnight events.       REVIEW OF SYSTEMS: remaining ROS negative     PHYSICAL EXAM:  VITALS:  Vital Signs Last 24 Hrs  T(C): 36.4 (14 Nov 2024 08:49), Max: 36.7 (13 Nov 2024 19:44)  T(F): 97.5 (14 Nov 2024 08:49), Max: 98.1 (13 Nov 2024 19:44)  HR: 98 (14 Nov 2024 08:49) (90 - 98)  BP: 131/71 (14 Nov 2024 08:49) (131/71 - 152/90)  BP(mean): --  RR: 16 (14 Nov 2024 08:49) (16 - 17)  SpO2: 98% (14 Nov 2024 08:49) (97% - 98%)    Parameters below as of 14 Nov 2024 08:49  Patient On (Oxygen Delivery Method): room air        GENERAL: NAD,  no increased WOB  HEAD:  Atraumatic, Normocephalic  EYES: EOMI, conjunctiva and sclera clear  ENMT: Moist mucous membranes  NECK: Supple, No JVD  NERVOUS SYSTEM:  Alert & Oriented   CHEST/LUNG: Clear to auscultation bilaterally; No rales, rhonchi, wheezing.   HEART: Regular rate and rhythm  ABDOMEN: Soft, Nontender, Nondistended; Bowel sounds present  EXTREMITIES:  No clubbing, cyanosis, calf tenderness or edema b/l      MEDICATIONS  (STANDING):  acetaminophen     Tablet .. 975 milliGRAM(s) Oral every 8 hours  apixaban 5 milliGRAM(s) Oral every 12 hours  atorvastatin 40 milliGRAM(s) Oral at bedtime  ceFAZolin   IVPB 2000 milliGRAM(s) IV Intermittent every 8 hours  chlorhexidine 2% Cloths 1 Application(s) Topical daily  cyclobenzaprine 10 milliGRAM(s) Oral three times a day  diclofenac sodium 1% Gel 2 Gram(s) Topical two times a day  DULoxetine 20 milliGRAM(s) Oral daily  ergocalciferol 05138 Unit(s) Oral <User Schedule>  erythromycin    ethylsuccinate Suspension 40 mG/mL 128 milliGRAM(s) Oral every 12 hours  ferrous    sulfate 325 milliGRAM(s) Oral at bedtime  lactobacillus acidophilus 1 Tablet(s) Oral every 12 hours  metoprolol tartrate 50 milliGRAM(s) Oral two times a day  montelukast 10 milliGRAM(s) Oral daily  multivitamin 1 Tablet(s) Oral daily  pramipexole 0.75 milliGRAM(s) Oral <User Schedule>  pregabalin 100 milliGRAM(s) Oral every 8 hours  senna 1 Tablet(s) Oral at bedtime  Vonoprazan (Voquezna) 10mg 1 Tablet(s),Vonoprazan (Voquezna) 10mg 1 Tablet(s) 1 Tablet(s) Oral <User Schedule>    MEDICATIONS  (PRN):  benzocaine/menthol Lozenge 1 Lozenge Oral every 3 hours PRN Mouth Sores  diphenhydrAMINE 25 milliGRAM(s) Oral two times a day PRN Rash and/or Itching  HYDROmorphone   Tablet 4 milliGRAM(s) Oral every 8 hours PRN Severe Pain (7 - 10)  melatonin 6 milliGRAM(s) Oral at bedtime PRN Insomnia        Allergies    Dilantin (Urticaria)  penicillins (Urticaria)    Intolerances    erythromycin (Stomach Upset; Vomiting; Nausea)          LABS:                                   9.6    4.66  )-----------( 406      ( 14 Nov 2024 06:01 )             31.9     11-14    140  |  104  |  24[H]  ----------------------------<  108[H]  3.9   |  25  |  0.82    Ca    9.2      14 Nov 2024 06:01    TPro  7.3  /  Alb  3.0[L]  /  TBili  0.3  /  DBili  x   /  AST  18  /  ALT  27  /  AlkPhos  192[H]  11-14    LIVER FUNCTIONS - ( 14 Nov 2024 06:01 )  Alb: 3.0 g/dL / Pro: 7.3 g/dL / ALK PHOS: 192 U/L / ALT: 27 U/L / AST: 18 U/L / GGT: x             Urinalysis Basic - ( 14 Nov 2024 06:01 )    Color: x / Appearance: x / SG: x / pH: x  Gluc: 108 mg/dL / Ketone: x  / Bili: x / Urobili: x   Blood: x / Protein: x / Nitrite: x   Leuk Esterase: x / RBC: x / WBC x   Sq Epi: x / Non Sq Epi: x / Bacteria: x        COVID-19 PCR: NotDetec (10-30-24 @ 21:45)  COVID-19 PCR: NotDetec (10-21-24 @ 09:20)  COVID-19 PCR: NotDetec (10-17-24 @ 16:34)    Consultant(s) Notes Reviewed:   Care Discused with Consultants/Other Providers:  Imaging Personally Reviewed:

## 2024-11-14 NOTE — PROGRESS NOTE ADULT - ASSESSMENT
74 year old female with PMHx of OA, HTN, gastroparesis, peripheral neuropathy, multiple prior thoracolumbar spine surgeries for congenital scoliosis [place] w/ [place] of hardware (1991) and recent fall with T11-T12 fracture, s/p thoracolumbar posterior fusion for t spine fx with plastics closure on  9/29 with Dr. Stroud and Dr. Funk, c/b post op csf leak, new onset paroxysmal Afib with RVR, increased post op pain and drainage from incision site, pleural effusions, symptomatic orthostatic hypotension, urinary retention, post op anemia, and CP with spontaneous resolution. Patient was sent to [facility] rehab on 10/17, c/b AMS, sepsis with high fever, rigors, tachycardia on 10/20 found to have MSSA bacteremia with right upper lobe infiltrate, and UTI. CT spine no report of collection. Urine +ve E coli, transferred back to [facility] on 10/23 for MICHAEL, advanced spine imaging, and further management, found to have paraspinal abscess on MR imaging, S/p paraspinal collection aspiration on 10/25, culture growing gram positive cocci in pairs, with rare staph aureus. Neurosurgery advised no acute surgical revision necessary, repeat BCx negative. To continue long-term IV cefazolin via PICC until 12/12/24 (6 wks) with eventual transition to PO Duricef long term/indefinitely as per ID. MICHAEL was negative for valvular vegetations but did reveal incidental PFO. Hospital course significant for recurrent fevers, A fib, started AC. Now at [facility] for rehab.     Traumatic T9-L3 Vertebral Fracture s/p Corpectomy and Fusion Surgery 9/29  MSSA Bacteremia 2/2 Paraspinal Abscess   - 10/20 Bcx MSSA, repeat blood cultures 10/21 and 10/23 negative   -  MRI: rim-enhancing heterogeneous fluid collection suspicious for an abscess; culture growing GPC in pairs and rare staph aureus   - TTE: no evidence of vegetations  - No Acute surgical intervention per neurosurgery   - s/p paraspinal collection aspiration 10/25 with Dr. Guerra, fluid culture grew MSSA  - continue Cefazolin 2g q8h via PICC until 12/12, then start Duricef 500mg bid indefinitely   - Weekly CBC, BMP, ESR and CRP  - TLSO when OOB   - Pain control and bowel regimen per rehab team   - RADHA drain dislodeged on 11/9, CT T/L Spine 11/9 showed Long segment spinal fusion as described. Improved paraspinal collections. No retained drain fragment identified.  - Continue comprehensive rehab program with PT/OT  - Outpatient follow up with spine surgeon     Hypertension  Orthostasis   - Continue metoprolol w/ holding paramters  - Continue to hold chlorthalidone and losartan   - Monitor BP    HLD  -Continue atorvastatin     Paroxysmal A-fib  -Continue Eliquis AC   -Continue lopressor 50mg bid    Post-Op Anemia  -H/H stable  -Started on ferrous sulfate   -Monitor CBC     Gastritis  Gastroparesis   - Continue vonoprazan and erythromycin susp    RLS  -Continue pramipexole     Seasonal Allergies  -Continue montelukast     Hypokalemia   -s/p repletion   -Monitor BMP    Loose Stool Likely 2/2 Laxatives   -Miralax discontinued and senna reduced to 1 tab QHS per primary team.   -Will continue to monitor.    DVT Prophylaxis:  - Eliquis

## 2024-11-14 NOTE — DISCHARGE NOTE PROVIDER - NSDCFUSCHEDAPPT_GEN_ALL_CORE_FT
Guille Lance  [facility] Physician [facility]  ORTHOSURG [address]  Scheduled Appointment: 11/22/2024    Catalino Cotto  [facility] Physician [facility]  WEIGHTMGMT  [facility]  Scheduled Appointment: 12/05/2024    Haydee Bernstein  [facility] Physician [facility]  GASTRO [address]  Scheduled Appointment: 01/06/2025

## 2024-11-14 NOTE — DISCHARGE NOTE PROVIDER - PROVIDER TOKENS
PROVIDER:[TOKEN:[[id number]]] PROVIDER:[TOKEN:[[id number]]],PROVIDER:[TOKEN:[[id number]],ESTABLISHEDPATIENT:[T]] PROVIDER:[TOKEN:[[id number]],FOLLOWUP:[1 week]],PROVIDER:[TOKEN:[[id number]],FOLLOWUP:[2 months],ESTABLISHEDPATIENT:[T]],PROVIDER:[TOKEN:[[id number]],FOLLOWUP:[2 weeks]]

## 2024-11-14 NOTE — PROGRESS NOTE ADULT - ASSESSMENT
Assessment/Plan:  Mrs. Haley French is a 74-year-old female patient with past medical history of OA, HTN, gastroparesis, peripheral neuropathy, multiple prior thoracolumbar spine surgeries for congenital scoliosis done >[place] years ago out of [place] w/ removal of hardware (1991) who is admitted for Acute Inpatient Rehabilitation with a multidisciplinary rehab program at [facility] with functional impairments in ADLs and mobility secondary to mechanical fall reporting a T12 three column spine fracture-malalignment treated surgically with a T9-L3 open fusion with PSO/lag screw partial corpectomy/interbody by Dr. Stroud, Neurosurgeon with complex Plastic Surgery closure by Dr. Elias on 9/29/24 subsequently complicated with MSSA bacteremia secondary to a paraspinal abscess s/p paraspinal collection aspiration on 10/25/24.     #Traumatic T9-L3 vertebral fracture s/p corpectomy and fusion surgery c/b MSSA Bacteremia 2/2 paraspinal abscess  - Paraspinal abscess with Sepsis due to methicillin susceptible Staphylococcus aureus.       * 10/20 Bcx MSSA, 10/21 no growth at 24hrs      * CXR with old rib fracture and small RUL infiltrate      * 10/20 +UA for Ecoli      * MRI: rim-enhancing heterogeneous fluid collection suspicious for an abscess; culture growing GPC in pairs and rare staph aureus       * TTE: no evidence of vegetations      * No Acute surgical intervention per neurosurgery       * S/P paraspinal collection aspiration with paraspinal abscess drain placed with Dr. Guerra 10/25; dislodged accidentally 11/9 with CT showing no retained elements of drain      * Monitor WBC and fever curve      * S/P PICC insertion 10/28      * TLSO when OOB      * Cefazolin 2000mg IV q8 via PICC- until 12/12/24-> Start PO Duricef 500mg BID on 12/13/24 - long term/indefinitely   - Weekly CBC Diff BMP ESR CRP; emailed to [email or web address]  - Activity Limitations: Decreased social, vocational and leisure activities, decreased self care and ADLs, decreased mobility, decreased ability to manage household and finances.   - Comprehensive Multidisciplinary Rehab Program:      * 3 hours a day, 5 days a week.      * PT 2hr/day: Focused on improving strength, endurance, coordination, balance, functional mobility, and transfers      * OT 1hr/day: Focused on improving strength, fine motor skills, coordination, posture and ADLs.      -----------------------------------------------------------------------------------  Concurrent Medical Problems    #Paroxysmal atrial fibrillation  - Metoprolol to 50mg PO BID for rate control  - Eliquis 5mg PO BID    #Acute on chronic low back pain.   - S/P T9-L3 open fusion with PSO/lag screw partial corpectomy/interbody with complex plastic closure (w/ Dr. Stroud and Dr. Tinajero on 9/29  - TLSO when OOB  - Tylenol 975mg PO TID ATC  - Duloxetine 20mg PO daily  - Lyrica 100mg PO TID   - Flexeril 10mg PO TID standing  - diclofenac gel 2g topically BID  - hydromorphone 4mg PO q8h PRN for severe pain    #HTN  #Orthostatic Hypotension  - 10/1 TTE: EF 61%  - S/P 1L IVF bolus (10/13) for symptomatic hypotension with good effect  - metoprolol tartrate 50mg PO BID  - d/reagan losartan  - Chlorthalidone 25 mg daily on hold (restart if SBP is consistently above 140 per cardio)  - Midodrine 7.5 mg on hold    #HLD  - atorvastatin 40mg PO qhs    #Postoperative anemia  - S/p PRBCs intraoperatively for T spine fusion   - Hgb stable with no evidence of active bleed at this time   - 11/11 Hgb 9.5  - Monitor H/H; transfuse for Hgb <7    #SILVANA (obstructive sleep apnea)  - Transient desaturations on RA    - Nocturnal 2L O2 and PRN during day during naps, monitor sats   - F/u with PCP outpatient as may benefit from sleep study and CPAP    #Restless legs syndrome (RLS)  - pramipexole 0.75mg PO daily   - s/w ferrous sulfate 325mg PO daily 11/12    #Seasonal allergies  - montelukast 10mg daily    #Liver cyst  -10/10 CT Abd/pelvis- There is a large cyst, stable since prior exam. There are small hypodense foci on segment II and segment I too small to characterize, unchanged since prior  - f/u outpatient     #Elevated serum alkaline phosphatase level  - high serum alk phos levels 247 --> 228 --> 172 --> 233 --> 239--> 240--> 247--> 234---> 276 -->227 --> 183  - monitor serum alk phos levels    #Gastritis  - Vonoprazan 10mg PO daily  - erythromycin 128mg oral fluid BID    #Mood/Cognition:  - neuropsychology consult PRN    #Sleep:   - maintain quiet hours and low stim environment  - melatonin 6mg HS PRN to maximize participation in therapy during the day    #GI/Bowel:  - senna 1 tablet PO qhs  - d/c Miralax iso soft stools/diarrhea 11/12    #/Bladder:   - PVR q 8 hours (SC if > 400)    #Skin/Pressure Injury:   - Skin assessment on admission: Midline sine dressing with drain in place, with minimal drainage. No pressure injuries    #Diet/Supplement  Current Diet: Regular  - ergocalciferol 04075G PO weekly on Fridays  - multivitamin PO daily     #Precautions / PROPHYLAXIS:   - Falls, Spinal  - ortho: Weight bearing status: WBAT, TLSO brace OOB  - Lungs: Incentive Spirometer   - DVT ppx: SCDs, TEDs, Eliquis 5mg PO BID  - Pressure injury/Skin:  OOB to Chair, PT/OT      ---------------  Code Status: FULL  Emergency Contact:    Outpatient Follow-up:    Fran Carr  Internal Medicine  [address], [address]  [place] [address]  Phone: [phone]  Fax: [phone]  Follow Up Time:     Kaushal Waterman  Infectious Disease  [address] [place] [id number]  Phone: [phone]  Follow Up Time:  [facility] Neuro[facility]  Neurosurgery  Referral Assistance Program    Guille Lance  [facility]  ORTHOSURG [address] [facility] [facility]  Scheduled Appointment: 11/22/2024    Catalino Cotto  [facility]  WEIGHTMGMT  [facility]  Scheduled Appointment: 12/05/2024    Haydee Bernstein  [facility]  GASTR[address]  Scheduled Appointment: 01/06/2025      [facility] - Infectious Disease  Infectious Disease  [address], [place] [address]  Phone: [phone]    --------------

## 2024-11-14 NOTE — DISCHARGE NOTE PROVIDER - NSDCMRMEDTOKEN_GEN_ALL_CORE_FT
acetaminophen 325 mg oral tablet: 3 tab(s) orally every 8 hours  apixaban 5 mg oral tablet: 1 tab(s) orally every 12 hours  atorvastatin 40 mg oral tablet: 1 tab(s) orally once a day (at bedtime)  ceFAZolin 2 g injection: 2 gram(s) intravenously every 8 hours To be administered via PICC line in 50mL on NS over 30 minutes; Weekly ESR and CRP to be followed by Dr. Kaushal Waterman  CT Lumbar Spine with subsequent evaluation for drain removal under fluoroscopy : Please obtain CT scan of patient&#x27;s lumbar spine and evaluate for removal of lumbar drain to be performed under fluoroscopy  cyclobenzaprine 10 mg oral tablet: 1 tab(s) orally 3 times a day  Dilaudid 4 mg oral tablet: 1 tab(s) orally every 8 hours as needed for Severe Pain (7 - 10) Hold for sedation  DULoxetine 20 mg oral delayed release capsule: 1 cap(s) orally once a day  Duricef 500 mg oral capsule: 1 cap(s) orally 2 times a day Please start taking on 12/13/2024, after you finish IV antibiotics. Please take until your follow-up appointment with infectious disease.  ergocalciferol 1.25 mg (50,000 intl units) oral capsule: 1 cap(s) orally once a week  erythromycin ethylsuccinate 400 mg/5 mL oral suspension: 128 milligram(s) orally 2 times a day  losartan 100 mg oral tablet: 1 tab(s) orally once a day  melatonin 5 mg oral tablet: 1 tab(s) orally once a day (at bedtime)  metoprolol tartrate 25 mg oral tablet: 1 tab(s) orally 2 times a day  montelukast 10 mg oral tablet: 1 tab(s) orally once a day  Multiple Vitamins oral tablet: 1 tab(s) orally once a day  polyethylene glycol 3350 oral powder for reconstitution: 17 gram(s) orally 2 times a day  pramipexole 0.75 mg oral tablet: 1 tab(s) orally once a day (at bedtime)  pregabalin 100 mg oral capsule: 1 cap(s) orally every 8 hours  senna leaf extract oral tablet: 2 tab(s) orally once a day (at bedtime)  vonoprazan 10 mg oral tablet: 1 tab(s) orally once a day   acetaminophen 325 mg oral tablet: 3 tab(s) orally every 8 hours  apixaban 5 mg oral tablet: 1 tab(s) orally every 12 hours  atorvastatin 40 mg oral tablet: 1 tab(s) orally once a day (at bedtime)  ceFAZolin 2 g injection: 2 gram(s) intravenously every 8 hours To be administered via PICC line in 50mL on NS over 30 minutes; Weekly ESR and CRP to be followed by Dr. Kaushal Waterman  cyclobenzaprine 10 mg oral tablet: 1 tab(s) orally 3 times a day MDD: 30mg  Dilaudid 4 mg oral tablet: 1 tab(s) orally every 8 hours as needed for Severe Pain (7 - 10) MDD 12mg MDD: 12mg  DULoxetine 20 mg oral delayed release capsule: 1 cap(s) orally once a day MDD: 20  Duricef 500 mg oral capsule: 1 cap(s) orally 2 times a day Please start taking on 12/13/2024, after you finish IV antibiotics. Please take until your follow-up appointment with infectious disease.  ergocalciferol 1.25 mg (50,000 intl units) oral capsule: 1 cap(s) orally once a week  ferrous sulfate 325 mg (65 mg elemental iron) oral tablet: 1 tab(s) orally once a day (at bedtime)  losartan 25 mg oral tablet: 1 tab(s) orally once a day  metoprolol tartrate 100 mg oral tablet: 1 tab(s) orally 2 times a day  montelukast 10 mg oral tablet: 1 tab(s) orally once a day  Multiple Vitamins oral tablet: 1 tab(s) orally once a day  pramipexole 0.75 mg oral tablet: 1 tab(s) orally once a day (at bedtime)  pregabalin 100 mg oral capsule: 1 cap(s) orally every 8 hours MDD: 300mg  senna leaf extract oral tablet: 2 tab(s) orally once a day (at bedtime)  vonoprazan 10 mg oral tablet: 1 tab(s) orally once a day   acetaminophen 325 mg oral tablet: 3 tab(s) orally every 8 hours  apixaban 5 mg oral tablet: 1 tab(s) orally every 12 hours  atorvastatin 40 mg oral tablet: 1 tab(s) orally once a day (at bedtime)  ceFAZolin 2 g injection: 2 gram(s) intravenously every 8 hours To be administered via PICC line in 50mL on NS over 30 minutes; Weekly ESR and CRP to be followed by Dr. Kaushal Waterman  cyclobenzaprine 10 mg oral tablet: 1 tab(s) orally 3 times a day MDD: 30mg  DULoxetine 20 mg oral delayed release capsule: 1 cap(s) orally once a day MDD: 20  Duricef 500 mg oral capsule: 1 cap(s) orally 2 times a day Please start taking on 12/13/2024, after you finish IV antibiotics. Please take until your follow-up appointment with infectious disease.  ergocalciferol 1.25 mg (50,000 intl units) oral capsule: 1 cap(s) orally once a week  ferrous sulfate 325 mg (65 mg elemental iron) oral tablet: 1 tab(s) orally once a day (at bedtime)  lidocaine 4% topical film: Apply topically to affected area once a day 12 hours on and 12 hours off  losartan 25 mg oral tablet: 1 tab(s) orally once a day  metoprolol tartrate 100 mg oral tablet: 1 tab(s) orally 2 times a day  montelukast 10 mg oral tablet: 1 tab(s) orally once a day  Multiple Vitamins oral tablet: 1 tab(s) orally once a day  pramipexole 0.75 mg oral tablet: 1 tab(s) orally once a day (at bedtime)  pregabalin 100 mg oral capsule: 1 cap(s) orally every 8 hours MDD: 300mg  senna leaf extract oral tablet: 2 tab(s) orally once a day (at bedtime)  vonoprazan 10 mg oral tablet: 1 tab(s) orally once a day   acetaminophen 325 mg oral tablet: 3 tab(s) orally every 8 hours  apixaban 5 mg oral tablet: 1 tab(s) orally every 12 hours  atorvastatin 40 mg oral tablet: 1 tab(s) orally once a day (at bedtime)  ceFAZolin 2 g injection: 2 gram(s) intravenously every 8 hours To be administered via PICC line in 50mL on NS over 30 minutes; Weekly ESR and CRP to be followed by Dr. Kaushal Waterman  cyclobenzaprine 10 mg oral tablet: 1 tab(s) orally 3 times a day MDD: 30mg  Dilaudid 4 mg oral tablet: 1 tab(s) orally every 8 hours as needed for Severe Pain (7 - 10) MDD 12mg MDD: 12mg  DULoxetine 20 mg oral delayed release capsule: 1 cap(s) orally once a day MDD: 20  Duricef 500 mg oral capsule: 1 cap(s) orally 2 times a day Please start taking on 12/13/2024, after you finish IV antibiotics. Please take until your follow-up appointment with infectious disease.  ergocalciferol 1.25 mg (50,000 intl units) oral capsule: 1 cap(s) orally once a week  ferrous sulfate 325 mg (65 mg elemental iron) oral tablet: 1 tab(s) orally once a day (at bedtime)  lidocaine 4% topical film: Apply topically to affected area once a day 12 hours on and 12 hours off  losartan 25 mg oral tablet: 1 tab(s) orally once a day  metoprolol tartrate 100 mg oral tablet: 1 tab(s) orally 2 times a day  montelukast 10 mg oral tablet: 1 tab(s) orally once a day  Multiple Vitamins oral tablet: 1 tab(s) orally once a day  pramipexole 0.75 mg oral tablet: 1 tab(s) orally once a day (at bedtime)  pregabalin 100 mg oral capsule: 1 cap(s) orally every 8 hours MDD: 300mg  senna leaf extract oral tablet: 2 tab(s) orally once a day (at bedtime)  vonoprazan 10 mg oral tablet: 1 tab(s) orally once a day

## 2024-11-14 NOTE — DISCHARGE NOTE PROVIDER - CARE PROVIDERS DIRECT ADDRESSES
,[email or web address]

## 2024-11-14 NOTE — DISCHARGE NOTE PROVIDER - CARE PROVIDER_API CALL
Kaushal Waterman  Infectious Disease  [address] [place] [id number]  Phone: [phone]  Fax: [phone]  Follow Up Time:    Kaushal Waterman  Infectious Disease  [address] [place] [address]  Phone: [phone]  Fax: ()-  Follow Up Time:     Chris Armendariz  Physical/Rehab Medicine  [address] [place] [address]  Phone: [phone]  Fax: [phone]  Established Patient  Follow Up Time:    Kaushal Waterman  Infectious Disease  [address] [place] [address]  Phone: [phone]  Fax: [phone])-  Follow Up Time: 1 week    Chris Armendariz  Physical/Rehab Medicine  [address] [place] [address]  Phone: [phone]  Fax: [phone]  Established Patient  Follow Up Time: 2 months    Fran Carr  Internal Medicine  [address] [place] [address]  Phone: [phone]  Fax: [phone]  Follow Up Time: 2 weeks

## 2024-11-14 NOTE — PROGRESS NOTE ADULT - SUBJECTIVE AND OBJECTIVE BOX
HPI:  Mrs. Haley French is a 74-year-old female patient with past medical history of OA, HTN, gastroparesis, peripheral neuropathy, multiple prior thoracolumbar spine surgeries for congenital scoliosis [place] >[place] w/ [place] of hardware (1991) who presented to [facility] on 9/28/24 with back pain and left hip pain s/p mechanical fall. CT imaging was performed and reported a T12 three column spine fracture-malalignment with fracture extending to the adjacent right 12th posterior rib and left T12-L1 facet joint and acute, displaced fracture of the left eighth lateral rib. Chronic appearing bilateral rib deformities. Surgical management was recommended and she is S/P T9-L3 open fusion with PSO/lag screw partial corpectomy/interbody by Dr. Stroud, Neurosurgeon with complex Plastic Surgery closure by Dr. Elias on 9/29/24. Post op course c/b CSK leak s/p repair, new onset paroxsymal Afib with RVR,  increased pain and drainage from surgical incision, development of pleural effusions, symptomatic orthostatic hypotension, urinary retention, post op anemia, and chest pain with spontaneous resolution. Cardiac work up negative. Patient was evaluated by PM&R and therapy for functional deficits, gait/ADL impairments and acute rehabilitation was recommended. Patient was cleared for discharge to [facility] IRF on 10/16/24.    Her admission was remarkable for a developing a fever of 103 with tachycardia and subjective fever/chills. Sepsis w/up was initiated and found to have small right upper lobe infiltrate, UTI, and MSSA bacteremia. CTH negative. Hospitalist and ID were consulted and per G+ bacteremia guidelines was recommended completing infectious workup, which includes MICHAEL which is not available at [facility]. Patient also required PICC line placement for long term ABx and a transfer request initiated from [place] to [facility] for MICHAEL and completion of bacteremia workup/treatment. All other medical co-morbidities were stable. Patient was deemed medically stable for discharge to [facility] medicine on 10/21/24 and was subsequently transferred on 10/23/24 for MICHAEL, advanced spine imaging, and further management. She was found to have a paraspinal abscess on MR imaging and is s/p paraspinal collection aspiration on 10/25/24. Culture grew gram positive cocci in pairs, with rare staph aureus. Neurosurgery advised and no acute surgical revision was deemed necessary. Repeat BCx was negative. Recommendation to continue long-term IV cefazolin via PICC until 12/12/24 (6 wks) with eventual transition to PO Duricef long term/indefinitely per ID. MICHAEL was negative for valvular vegetations but did reveal incidental PFO. Hospital course was significant for recurrent fevers, A fib (resolved) with source control and electrolyte correction, initiated on DOAc given elevated [id number]. Patient was evaluated by PM&R and therapy for functional deficits, gait/ADL impairments and acute rehabilitation was recommended. Patient was cleared for discharge to [facility] IRF on 10/30/24.   (30 Oct 2024 13:05)    TDD: 11/20/24 to Home  ___________________________________________________________________________    SUBJECTIVE/ROS:   Patient seen and examined in room and seen in gym with TLSO on.   No acute overnight events and reported some improvement in her sleep.  Neurosurgeon contacted regarding recommendations to discontinue TLSO.  Recommended wean as tolerated.  Case was discussed at Interdisciplinary Team meeting today.  No changes to the tentative discharge date outlined above.  Patient is eager to continue participation on the recommended rehabilitation program.   No other concerns. Case to be reevaluated at Interdisciplinary Team meeting tomorrow.    ___________________________________________________________________________    CT Lumbar Spine w/wo IV Cont (11.09.24 @ 13:55)  IMPRESSION: Long segment spinal fusion as described. Improved paraspinal collections. No retained drain fragment identified.    ___________________________________________________________________________    Vital Signs Last 24 Hrs  T(C): 36.4 (14 Nov 2024 08:49), Max: 36.7 (13 Nov 2024 19:44)  T(F): 97.5 (14 Nov 2024 08:49), Max: 98.1 (13 Nov 2024 19:44)  HR: 98 (14 Nov 2024 08:49) (90 - 98)  BP: 131/71 (14 Nov 2024 08:49) (131/71 - 152/90)  RR: 16 (14 Nov 2024 08:49) (16 - 17)  SpO2: 98% (14 Nov 2024 08:49) (97% - 98%)    ___________________________________________________________________________    LAB                        9.6    4.66  )-----------( 406      ( 14 Nov 2024 06:01 )             31.9       11-14    140  |  104  |  24[H]  ----------------------------<  108[H]  3.9   |  25  |  0.82    Ca    9.2      14 Nov 2024 06:01    TPro  7.3  /  Alb  3.0[L]  /  TBili  0.3  /  DBili  x   /  AST  18  /  ALT  27  /  AlkPhos  192[H]  11-14    LIVER FUNCTIONS - ( 14 Nov 2024 06:01 )  Alb: 3.0 g/dL / Pro: 7.3 g/dL / ALK PHOS: 192 U/L / ALT: 27 U/L / AST: 18 U/L / GGT: x           ___________________________________________________________________________    MEDICATIONS  (STANDING):  acetaminophen     Tablet .. 975 milliGRAM(s) Oral every 8 hours  apixaban 5 milliGRAM(s) Oral every 12 hours  atorvastatin 40 milliGRAM(s) Oral at bedtime  ceFAZolin   IVPB 2000 milliGRAM(s) IV Intermittent every 8 hours  chlorhexidine 2% Cloths 1 Application(s) Topical daily  cyclobenzaprine 10 milliGRAM(s) Oral three times a day  diclofenac sodium 1% Gel 2 Gram(s) Topical two times a day  DULoxetine 20 milliGRAM(s) Oral daily  ergocalciferol 85950 Unit(s) Oral <User Schedule>  erythromycin    ethylsuccinate Suspension 40 mG/mL 128 milliGRAM(s) Oral every 12 hours  ferrous    sulfate 325 milliGRAM(s) Oral at bedtime  lactobacillus acidophilus 1 Tablet(s) Oral every 12 hours  metoprolol tartrate 50 milliGRAM(s) Oral two times a day  montelukast 10 milliGRAM(s) Oral daily  multivitamin 1 Tablet(s) Oral daily  pramipexole 0.75 milliGRAM(s) Oral <User Schedule>  pregabalin 100 milliGRAM(s) Oral every 8 hours  senna 1 Tablet(s) Oral at bedtime  Vonoprazan (Voquezna) 10mg 1 Tablet(s),Vonoprazan (Voquezna) 10mg 1 Tablet(s) 1 Tablet(s) Oral <User Schedule>    MEDICATIONS  (PRN):  benzocaine/menthol Lozenge 1 Lozenge Oral every 3 hours PRN Mouth Sores  diphenhydrAMINE 25 milliGRAM(s) Oral two times a day PRN Rash and/or Itching  HYDROmorphone   Tablet 4 milliGRAM(s) Oral every 8 hours PRN Severe Pain (7 - 10)  melatonin 6 milliGRAM(s) Oral at bedtime PRN Insomnia    ___________________________________________________________________________    PHYSICAL EXAM:    Constitutional - NAD, Comfortable  Chest - good chest expansion, good respiratory effort  Cardio - warm and well perfused  Extremities - No peripheral edema, No calf tenderness   Neuro-     Cognitive - AAOx3, follows command     Motor -                     LEFT    UE - ShAB 4/5, EF 5/5, EE 5/5, WE 5/5,  5/5                    RIGHT UE - ShAB 4/5, EF 5/5, EE 5/5, WE 5/5,  5/5                    LEFT    LE - HF 4/5, KE 5/5, DF 5/5, PF 5/5                    RIGHT LE - HF 4/5, KE 5/5, DF 5/5, PF 5/5        Sensory - Intact to LT in UEs and LEs B/L      Reflexes - DTRs Intact     Coordination - FTN intact     Sensory - Intact to LT bilateral LE, but diminished  Psychiatric - Mood stable, Affect WNL      ___________________________________________________________________________

## 2024-11-14 NOTE — DISCHARGE NOTE PROVIDER - NSDCCPCAREPLAN_GEN_ALL_CORE_FT
PRINCIPAL DISCHARGE DIAGNOSIS  Diagnosis: Sepsis due to methicillin resistant Staphylococcus aureus  Assessment and Plan of Treatment: you had a spinal infection which we are treating with antibiotics. Please take ancef as prescribed until 12/12/2024. Afterwards, please take oral duricef as prescribed until your follow-up appointment with infectious disease. Please follow-up with the infectious disease clinic after you leave rehab so that you can follow-up on your plan to treat yuor infection.     PRINCIPAL DISCHARGE DIAGNOSIS  Diagnosis: Sepsis due to methicillin resistant Staphylococcus aureus  Assessment and Plan of Treatment: You had a spinal infection which we are treating with antibiotics. Please take Ancef 2 grams every 8 hours as prescribed through your PICC line until 12/12/2024. Afterwards, please take oral Duricef as prescribed until your follow-up appointment with infectious disease. Please follow-up with the infectious disease clinic after you leave rehab so that you can follow-up on your plan to treat your infection. Additionally, please obtain outpatient lab work/blood work every week and email the results to [email or web address]. The labs  you will need to get include a CBC with differential, BMP, ESR, and CRP. You may only use the back brace as necessary for pain - you do not need to wear it at all times anymore. Please follow up with your back surgeon within 2 weeks of discharge.      SECONDARY DISCHARGE DIAGNOSES  Diagnosis: Paroxysmal atrial fibrillation  Assessment and Plan of Treatment: You have paroxysmal atrial fibrilliation, which means that your heart sometimes beats abnormally. Please continue to take metoprolol 100mg twice per day to help control your heart rate. Please continue to take Eliquis 5mg twice per day to help prevent the formation of blood clots. Please follow up with your cardiologist for further management.    Diagnosis: Acute on chronic low back pain  Assessment and Plan of Treatment: You had surgery on your back with expected pain afterward. You may continue to use the TLSO brace as needed when you have pain. You may also continue to take Tylenol 975mg three times per day ( by 8 hours). You may also continue to take Duloxetine 20mg per day, Lyrica 100mg three times per day, and the muscle relaxant called Flexeril 10mg three times per day. All of these will help with your pain. You may also use diclofenac gel 2g topically twice per day on the painful area. Please follow up with Dr. Anders within 6-8 weeks of discharge from the hospital. Please follow up with your back surgeon within 2 weeks of discharge from the hospital.    Diagnosis: Hypertension  Assessment and Plan of Treatment: You have high blood pressure. Please continue to take metoprolol 100mg twice per day and losartan 25mg every day to help keep your blood pressure under control. Please follow up with your cardiologist and/or primary care provider for further care.    Diagnosis: Orthostatic hypotension  Assessment and Plan of Treatment:     Diagnosis: Hyperlipidemia  Assessment and Plan of Treatment: You have high cholesterol. Please continue to take atorvastatin 40mg every night and follow up with your primary care provider and/or cardiologist for further care and management.    Diagnosis: SILVANA (obstructive sleep apnea)  Assessment and Plan of Treatment: You occasionally had drops in your oxygen level when you were sleeping. You may have a condition called obstructive sleep apnea, which can cause this problem. It is important to follow up with your primary care provider after you are discharged to undergo further studies for this condition, which can create problems with your lungs and your blood pressure, among other things, if left untreated.    Diagnosis: Restless leg syndrome  Assessment and Plan of Treatment: You have restless leg syndrome. Please continue to take pramipexole 0.75mg every day and iron supplements (ferrous sulfate 325mg) every day to try to help these symptoms. Please follow up with your primary care provider or restless leg syndrome specialist for further care and management.    Diagnosis: Liver cyst  Assessment and Plan of Treatment: During imaging in the hospital, you were noted to have a cyst on your liver. We recommend that you follow up with your primary care doctor or a gastroenterologist outpatient for further evaluation of this lesion.    Diagnosis: Gastritis  Assessment and Plan of Treatment: You have gastritis, or inflammation in the stomach. Please continue to take Vonoprazan 10mg every day and erythromycin 128mg twice per day. Please follow up with your primary care provider and/or gastroenterologist for further management and care.

## 2024-11-14 NOTE — DISCHARGE NOTE PROVIDER - NSDCHHNEEDSERVICE_GEN_ALL_CORE
Central venous access care/Rehabilitation services/Teaching and training/Other, specify... Cheiloplasty (Less Than 50%) Text: A decision was made to reconstruct the defect with a  cheiloplasty.  The defect was undermined extensively.  Additional obicularis oris muscle was excised with a 15 blade scalpel.  The defect was converted into a full thickness wedge, of less than 50% of the vertical height of the lip, to facilite a better cosmetic result.  Small vessels were then tied off with 5-0 monocyrl. The obicularis oris, superficial fascia, adipose and dermis were then reapproximated.  After the deeper layers were approximated the epidermis was reapproximated with particular care given to realign the vermilion border.

## 2024-11-14 NOTE — DISCHARGE NOTE PROVIDER - REASON FOR ADMISSION
Traumatic T9-L3 vertebral fracture s/p corpectomy and fusion surgery c/b MSSA Bacteremia 2/2 paraspinal abscess

## 2024-11-14 NOTE — DISCHARGE NOTE PROVIDER - NSDCFUADDAPPT_GEN_ALL_CORE_FT
Please follow up with Infectious disease within two weeks of discharge  [facility] rehab.    Please follow up with your primary care provider within two weeks of discharge from [facility] rehab    Please follow up with Dr Armendariz (your Rehab physician)  within 6-8 weeks after discharge from [facility] rehab Please follow up with Infectious disease within two weeks of discharge  [facility] rehab.    Please follow up with your primary care provider within two weeks of discharge from [facility] rehab.    Please follow up with Dr Armendariz (your Rehab physician) within 6-8 weeks after discharge from [facility] rehab.

## 2024-11-14 NOTE — DISCHARGE NOTE PROVIDER - HOSPITAL COURSE
abx until 12/12/24 HPI:  Mrs. Haley French is a 74-year-old female patient with past medical history of OA, HTN, gastroparesis, peripheral neuropathy, multiple prior thoracolumbar spine surgeries for congenital scoliosis [place] w/ [place] of hardware (1991) who presented to [facility] on 9/28/24 with back pain and left hip pain s/p mechanical fall. CT imaging was performed and reported a T12 three column spine fracture-malalignment with fracture extending to the adjacent right 12th posterior rib and left T12-L1 facet joint and acute, displaced fracture of the left eighth lateral rib. Chronic appearing bilateral rib deformities. Surgical management was recommended and she is S/P T9-L3 open fusion with PSO/lag screw partial corpectomy/interbody by Dr. Stroud, Neurosurgeon with complex Plastic Surgery closure by Dr. Elias on 9/29/24. Post op course c/b CSK leak s/p repair, new onset paroxsymal Afib with RVR,  increased pain and drainage from surgical incision, development of pleural effusions, symptomatic orthostatic hypotension, urinary retention, post op anemia, and chest pain with spontaneous resolution. Cardiac work up negative. Patient was evaluated by PM&R and therapy for functional deficits, gait/ADL impairments and acute rehabilitation was recommended. Patient was cleared for discharge to [facility] IRF on 10/16/24.    Her admission was remarkable for a developing a fever of 103 with tachycardia and subjective fever/chills. Sepsis w/up was initiated and found to have small right upper lobe infiltrate, UTI, and MSSA bacteremia. CTH negative. Hospitalist and ID were consulted and per G+ bacteremia guidelines was recommended completing infectious workup, which includes MICHAEL which is not available at [facility]. Patient also required PICC line placement for long term ABx and a transfer request initiated from [place] to [facility] for MICHAEL and completion of bacteremia workup/treatment. All other medical co-morbidities were stable. Patient was deemed medically stable for discharge to [facility] medicine on 10/21/24 and was subsequently transferred on 10/23/24 for MICHAEL, advanced spine imaging, and further management. She was found to have a paraspinal abscess on MR imaging and is s/p paraspinal collection aspiration on 10/25/24. Culture grew gram positive cocci in pairs, with rare staph aureus. Neurosurgery advised and no acute surgical revision was deemed necessary. Repeat BCx was negative. Recommendation to continue long-term IV cefazolin via PICC until 12/12/24 (6 wks) with eventual transition to PO Duricef long term/indefinitely per ID. MICHAEL was negative for valvular vegetations but did reveal incidental PFO. Hospital course was significant for recurrent fevers, A fib (resolved) with source control and electrolyte correction, initiated on DOAc given elevated [id number]. Patient was evaluated by PM&R and therapy for functional deficits, gait/ADL impairments and acute rehabilitation was recommended. Patient was cleared for discharge to [facility] IRF on 10/30/24.   (30 Oct 2024 13:05)      Rehab course significant for urine retention resolved . Orthostatic hypotension treated with IV fluid     All other medical co-morbidites were stable.    Pt tolerated course of inpatient pt/ot/slp rehab with significant functional improvements and met rehab goals prior to discharge.     Pt was medically cleared on_ for discharge to _            abx until 12/12/24 HPI:  Mrs. Haley French is a 74-year-old female patient with past medical history of OA, HTN, gastroparesis, peripheral neuropathy, multiple prior thoracolumbar spine surgeries for congenital scoliosis done >[place] w/ removal of hardware (1991) who presented to [facility] on 9/28/24 with back pain and left hip pain s/p mechanical fall. CT imaging was performed and reported a T12 three column spine fracture-malalignment with fracture extending to the adjacent right 12th posterior rib and left T12-L1 facet joint and acute, displaced fracture of the left eighth lateral rib. Chronic appearing bilateral rib deformities. Surgical management was recommended and she is S/P T9-L3 open fusion with PSO/lag screw partial corpectomy/interbody by Dr. Stroud, Neurosurgeon with complex Plastic Surgery closure by Dr. Elias on 9/29/24. Post op course c/b CSK leak s/p repair, new onset paroxsymal Afib with RVR,  increased pain and drainage from surgical incision, development of pleural effusions, symptomatic orthostatic hypotension, urinary retention, post op anemia, and chest pain with spontaneous resolution. Cardiac work up negative. Patient was evaluated by PM&R and therapy for functional deficits, gait/ADL impairments and acute rehabilitation was recommended. Patient was cleared for discharge to [facility] IRF on 10/16/24.    Her admission was remarkable for a developing a fever of 103 with tachycardia and subjective fever/chills. Sepsis w/up was initiated and found to have small right upper lobe infiltrate, UTI, and MSSA bacteremia. CTH negative. Hospitalist and ID were consulted and per G+ bacteremia guidelines was recommended completing infectious workup, which includes MICHAEL which is not available at [facility]. Patient also required PICC line placement for long term ABx and a transfer request initiated from [place] to [facility] for MICHAEL and completion of bacteremia workup/treatment. All other medical co-morbidities were stable. Patient was deemed medically stable for discharge to [facility] medicine on 10/21/24 and was subsequently transferred on 10/23/24 for MICHAEL, advanced spine imaging, and further management. She was found to have a paraspinal abscess on MR imaging and is s/p paraspinal collection aspiration on 10/25/24. Culture grew gram positive cocci in pairs, with rare staph aureus. Neurosurgery advised and no acute surgical revision was deemed necessary. Repeat BCx was negative. Recommendation to continue long-term IV cefazolin via PICC until 12/12/24 (6 wks) with eventual transition to PO Duricef long term/indefinitely per ID. MICHAEL was negative for valvular vegetations but did reveal incidental PFO. Hospital course was significant for recurrent fevers, A fib (resolved) with source control and electrolyte correction, initiated on DOAc given elevated [id number]. Patient was evaluated by PM&R and therapy for functional deficits, gait/ADL impairments and acute rehabilitation was recommended. Patient was cleared for discharge to [facility] IRF on 10/30/24.   (30 Oct 2024 13:05)      Rehab course significant for urine retention resolved . Orthostatic hypotension treated with IV fluid, held chlorthalidone and losartan.   Anemia which is stable pt started on ferrous sulfate.     All other medical co-morbidites were stable.    Pt tolerated course of inpatient pt/ot/slp rehab with significant functional improvements and met rehab goals prior to discharge.     Pt was medically cleared on 11/20/24 for discharge to home             abx until 12/12/24

## 2024-11-14 NOTE — DISCHARGE NOTE PROVIDER - NSDCADMDATE_GEN_ALL_CORE_FT
your teen take the medicines exactly as prescribed. Make sure your teen doesn't stop taking them. These medicines may need time to work. If your teen stops taking them too soon, the symptoms may come back or get worse. · Learn about antidepressants. They often work well for teens who are depressed. ¨ Your teen may start to feel better after 1 to 3 weeks of taking the medicine. But it can take as many as 6 to 8 weeks to see more improvement. ¨ Antidepressants may increase the chance that your teen will think about or try suicide, especially in the first few weeks of use. If your teen is prescribed an antidepressant, learn the warning signs of suicide. See the \"When should you call for help? \" section. · Help your teen find the best antidepressant for him or her. Your teen may have to try different antidepressants before finding one that works. If you have concerns about the medicine, or if your teen doesn't seem better in 3 weeks, talk to your doctor. · Watch for side effects. Stopping suddenly can make your teen feel tired, dizzy, or nervous. Many side effects are mild and go away on their own after a few weeks. Talk to your doctor if you think side effects are bothering your teen too much. · Do not let your teen suddenly stop taking antidepressants. This could be dangerous. Your doctor can help your teen slowly reduce the dose to prevent problems. To help your teen manage depression  · Learn as much about depression as you can. · Give your teen support and understanding. This is one of the most important things you can do to help your teen cope with depression. · If your teen is going to counseling, make sure he or she goes to all appointments. If your doctor suggests family counseling, be sure you all go together. · Try to see that your teen eats a balanced diet. This helps the body deal with tension and stress.  Whole grains, dairy products, fruits, vegetables, and protein are part of a balanced 30-Oct-2024 16:04

## 2024-11-15 PROCEDURE — 99232 SBSQ HOSP IP/OBS MODERATE 35: CPT | Mod: GC

## 2024-11-15 RX ADMIN — Medication 25 MILLIGRAM(S): at 21:37

## 2024-11-15 RX ADMIN — DICLOFENAC SODIUM 2 GRAM(S): 20 SOLUTION TOPICAL at 17:43

## 2024-11-15 RX ADMIN — ACETAMINOPHEN 500MG 975 MILLIGRAM(S): 500 TABLET, COATED ORAL at 14:15

## 2024-11-15 RX ADMIN — APIXABAN 5 MILLIGRAM(S): 2.5 TABLET, FILM COATED ORAL at 17:40

## 2024-11-15 RX ADMIN — HYDROMORPHONE HYDROCHLORIDE 4 MILLIGRAM(S): 2 TABLET ORAL at 16:33

## 2024-11-15 RX ADMIN — Medication 1 TABLET(S): at 21:38

## 2024-11-15 RX ADMIN — Medication 325 MILLIGRAM(S): at 21:38

## 2024-11-15 RX ADMIN — Medication 50000 UNIT(S): at 06:40

## 2024-11-15 RX ADMIN — CHLORHEXIDINE GLUCONATE 1 APPLICATION(S): 1.2 RINSE ORAL at 12:32

## 2024-11-15 RX ADMIN — Medication 1 TABLET(S): at 12:31

## 2024-11-15 RX ADMIN — APIXABAN 5 MILLIGRAM(S): 2.5 TABLET, FILM COATED ORAL at 06:41

## 2024-11-15 RX ADMIN — Medication 10 MILLIGRAM(S): at 21:40

## 2024-11-15 RX ADMIN — METOPROLOL TARTRATE 50 MILLIGRAM(S): 100 TABLET, FILM COATED ORAL at 06:41

## 2024-11-15 RX ADMIN — Medication 100 MILLIGRAM(S): at 21:37

## 2024-11-15 RX ADMIN — PREGABALIN 100 MILLIGRAM(S): 75 CAPSULE ORAL at 06:51

## 2024-11-15 RX ADMIN — ACETAMINOPHEN 500MG 975 MILLIGRAM(S): 500 TABLET, COATED ORAL at 07:10

## 2024-11-15 RX ADMIN — Medication 40 MILLIGRAM(S): at 21:38

## 2024-11-15 RX ADMIN — PRAMIPEXOLE 0.75 MILLIGRAM(S): 1.5 TABLET, EXTENDED RELEASE ORAL at 16:01

## 2024-11-15 RX ADMIN — PREGABALIN 100 MILLIGRAM(S): 75 CAPSULE ORAL at 14:14

## 2024-11-15 RX ADMIN — ACETAMINOPHEN 500MG 975 MILLIGRAM(S): 500 TABLET, COATED ORAL at 06:40

## 2024-11-15 RX ADMIN — PREGABALIN 100 MILLIGRAM(S): 75 CAPSULE ORAL at 21:37

## 2024-11-15 RX ADMIN — Medication 1 TABLET(S): at 06:40

## 2024-11-15 RX ADMIN — Medication 1 TABLET(S): at 17:40

## 2024-11-15 RX ADMIN — ACETAMINOPHEN 500MG 975 MILLIGRAM(S): 500 TABLET, COATED ORAL at 21:38

## 2024-11-15 RX ADMIN — Medication 100 MILLIGRAM(S): at 06:41

## 2024-11-15 RX ADMIN — MONTELUKAST SODIUM 10 MILLIGRAM(S): 10 TABLET ORAL at 12:32

## 2024-11-15 RX ADMIN — Medication 20 MILLIGRAM(S): at 12:32

## 2024-11-15 RX ADMIN — DICLOFENAC SODIUM 2 GRAM(S): 20 SOLUTION TOPICAL at 06:42

## 2024-11-15 RX ADMIN — Medication 100 MILLIGRAM(S): at 14:11

## 2024-11-15 RX ADMIN — Medication 10 MILLIGRAM(S): at 14:14

## 2024-11-15 RX ADMIN — ACETAMINOPHEN 500MG 975 MILLIGRAM(S): 500 TABLET, COATED ORAL at 15:15

## 2024-11-15 RX ADMIN — METOPROLOL TARTRATE 50 MILLIGRAM(S): 100 TABLET, FILM COATED ORAL at 17:42

## 2024-11-15 RX ADMIN — HYDROMORPHONE HYDROCHLORIDE 4 MILLIGRAM(S): 2 TABLET ORAL at 17:33

## 2024-11-15 RX ADMIN — Medication 10 MILLIGRAM(S): at 06:40

## 2024-11-15 NOTE — PROGRESS NOTE ADULT - SUBJECTIVE AND OBJECTIVE BOX
HPI:  Mrs. Haley French is a 74-year-old female patient with past medical history of OA, HTN, gastroparesis, peripheral neuropathy, multiple prior thoracolumbar spine surgeries for congenital scoliosis [place] >[place]/ [place] of hardware (1991) who presented to [facility] on 9/28/24 with back pain and left hip pain s/p mechanical fall. CT imaging was performed and reported a T12 three column spine fracture-malalignment with fracture extending to the adjacent right 12th posterior rib and left T12-L1 facet joint and acute, displaced fracture of the left eighth lateral rib. Chronic appearing bilateral rib deformities. Surgical management was recommended and she is S/P T9-L3 open fusion with PSO/lag screw partial corpectomy/interbody by Dr. Stroud, Neurosurgeon with complex Plastic Surgery closure by Dr. Elias on 9/29/24. Post op course c/b CSK leak s/p repair, new onset paroxsymal Afib with RVR,  increased pain and drainage from surgical incision, development of pleural effusions, symptomatic orthostatic hypotension, urinary retention, post op anemia, and chest pain with spontaneous resolution. Cardiac work up negative. Patient was evaluated by PM&R and therapy for functional deficits, gait/ADL impairments and acute rehabilitation was recommended. Patient was cleared for discharge to [facility] IRF on 10/16/24.    Her admission was remarkable for a developing a fever of 103 with tachycardia and subjective fever/chills. Sepsis w/up was initiated and found to have small right upper lobe infiltrate, UTI, and MSSA bacteremia. CTH negative. Hospitalist and ID were consulted and per G+ bacteremia guidelines was recommended completing infectious workup, which includes MICHAEL which is not available at [facility]. Patient also required PICC line placement for long term ABx and a transfer request initiated from [place] to [facility] for MICHAEL and completion of bacteremia workup/treatment. All other medical co-morbidities were stable. Patient was deemed medically stable for discharge to [facility] medicine on 10/21/24 and was subsequently transferred on 10/23/24 for MICHAEL, advanced spine imaging, and further management. She was found to have a paraspinal abscess on MR imaging and is s/p paraspinal collection aspiration on 10/25/24. Culture grew gram positive cocci in pairs, with rare staph aureus. Neurosurgery advised and no acute surgical revision was deemed necessary. Repeat BCx was negative. Recommendation to continue long-term IV cefazolin via PICC until 12/12/24 (6 wks) with eventual transition to PO Duricef long term/indefinitely per ID. MICHAEL was negative for valvular vegetations but did reveal incidental PFO. Hospital course was significant for recurrent fevers, A fib (resolved) with source control and electrolyte correction, initiated on DOAC given elevated [id number]. Patient was evaluated by PM&R and therapy for functional deficits, gait/ADL impairments and acute rehabilitation was recommended. Patient was cleared for discharge to [facility] IRF on 10/30/24.   (30 Oct 2024 13:05)    ___________________________________________________________________________    SUBJECTIVE/ROS  Patient was seen and evaluated at bedside today.  She reports she had mid back pain around her spine last night that prevented her from sleeping initially, until she got Dilaudid. She states this is the first time in over a week she has had such pain and has required Dilaudid. She slept well once she got Dilaudid and has no pain this morning. She has been weaning off the TLSO brace as per Dr. Stroud's orders, though states she still feels she needs it during therapy to help with pain.   Eager to participate on the recommended rehabilitation program.  Denies any CP, SOB, palpitations, fever, or any other symptoms at this time.   ___________________________________________________________________________    VITALS  T(C): 36.4 (11-15-24 @ 08:37), Max: 36.5 (11-14-24 @ 19:27)  HR: 87 (11-15-24 @ 08:37) (81 - 100)  BP: 151/95 (11-15-24 @ 08:37) (145/82 - 152/84)  RR: 16 (11-15-24 @ 08:37) (16 - 18)  SpO2: 97% (11-15-24 @ 08:37) (96% - 97%)  ___________________________________________________________________________    LABS             9.6    4.66  )-----------( 406      ( 14 Nov 2024 06:01 )             31.9     140  |  104  |  24[H]  ----------------------------<  108[H]  3.9   |  25  |  0.82    Ca    9.2      14 Nov 2024 06:01  TPro  7.3  /  Alb  3.0[L]  /  TBili  0.3  /  DBili  x   /  AST  18  /  ALT  27  /  AlkPhos  192[H]  11-14    ___________________________________________________________________________    MEDICATIONS  (STANDING):  acetaminophen     Tablet .. 975 milliGRAM(s) Oral every 8 hours  apixaban 5 milliGRAM(s) Oral every 12 hours  atorvastatin 40 milliGRAM(s) Oral at bedtime  ceFAZolin   IVPB 2000 milliGRAM(s) IV Intermittent every 8 hours  chlorhexidine 2% Cloths 1 Application(s) Topical daily  cyclobenzaprine 10 milliGRAM(s) Oral three times a day  diclofenac sodium 1% Gel 2 Gram(s) Topical two times a day  DULoxetine 20 milliGRAM(s) Oral daily  ergocalciferol 19324 Unit(s) Oral <User Schedule>  erythromycin    ethylsuccinate Suspension 40 mG/mL 128 milliGRAM(s) Oral every 12 hours  ferrous    sulfate 325 milliGRAM(s) Oral at bedtime  lactobacillus acidophilus 1 Tablet(s) Oral every 12 hours  metoprolol tartrate 50 milliGRAM(s) Oral two times a day  montelukast 10 milliGRAM(s) Oral daily  multivitamin 1 Tablet(s) Oral daily  pramipexole 0.75 milliGRAM(s) Oral <User Schedule>  pregabalin 100 milliGRAM(s) Oral every 8 hours  senna 1 Tablet(s) Oral at bedtime  Vonoprazan (Voquezna) 10mg 1 Tablet(s),Vonoprazan (Voquezna) 10mg 1 Tablet(s) 1 Tablet(s) Oral <User Schedule>    MEDICATIONS  (PRN):  benzocaine/menthol Lozenge 1 Lozenge Oral every 3 hours PRN Mouth Sores  diphenhydrAMINE 25 milliGRAM(s) Oral two times a day PRN Rash and/or Itching  HYDROmorphone   Tablet 4 milliGRAM(s) Oral every 8 hours PRN Severe Pain (7 - 10)  melatonin 6 milliGRAM(s) Oral at bedtime PRN Insomnia    ___________________________________________________________________________    PHYSICAL EXAM:    Constitutional - NAD, Comfortable  Chest - good chest expansion, good respiratory effort, CTAB  Cardio - warm and well perfused  Extremities - No peripheral edema, No calf tenderness   Neurologic Exam:                    Cognitive -             Orientation: Awake, A&O to self, place, date, year, situation     Speech - Fluent, Comprehensible, No dysarthria, No aphasia   Psychiatric - Mood stable, Affect WNL  Skin: incision on back c/d/i and without tenderness    ___________________________________________________________________________ HPI:  Mrs. Haley French is a 74-year-old female patient with past medical history of OA, HTN, gastroparesis, peripheral neuropathy, multiple prior thoracolumbar spine surgeries for congenital scoliosis done >[place] years ago out of [place] w/ removal of hardware (1991) who presented to [facility] on 9/28/24 with back pain and left hip pain s/p mechanical fall. CT imaging was performed and reported a T12 three column spine fracture-malalignment with fracture extending to the adjacent right 12th posterior rib and left T12-L1 facet joint and acute, displaced fracture of the left eighth lateral rib. Chronic appearing bilateral rib deformities. Surgical management was recommended and she is S/P T9-L3 open fusion with PSO/lag screw partial corpectomy/interbody by Dr. Stroud, Neurosurgeon with complex Plastic Surgery closure by Dr. Elias on 9/29/24. Post op course c/b CSK leak s/p repair, new onset paroxsymal Afib with RVR,  increased pain and drainage from surgical incision, development of pleural effusions, symptomatic orthostatic hypotension, urinary retention, post op anemia, and chest pain with spontaneous resolution. Cardiac work up negative. Patient was evaluated by PM&R and therapy for functional deficits, gait/ADL impairments and acute rehabilitation was recommended. Patient was cleared for discharge to [facility] IRF on 10/16/24.    Her admission was remarkable for a developing a fever of 103 with tachycardia and subjective fever/chills. Sepsis w/up was initiated and found to have small right upper lobe infiltrate, UTI, and MSSA bacteremia. CTH negative. Hospitalist and ID were consulted and per G+ bacteremia guidelines was recommended completing infectious workup, which includes MICHAEL which is not available at [facility]. Patient also required PICC line placement for long term ABx and a transfer request initiated from [place] to [facility] for MICHAEL and completion of bacteremia workup/treatment. All other medical co-morbidities were stable. Patient was deemed medically stable for discharge to [facility] medicine on 10/21/24 and was subsequently transferred on 10/23/24 for MICHAEL, advanced spine imaging, and further management. She was found to have a paraspinal abscess on MR imaging and is s/p paraspinal collection aspiration on 10/25/24. Culture grew gram positive cocci in pairs, with rare staph aureus. Neurosurgery advised and no acute surgical revision was deemed necessary. Repeat BCx was negative. Recommendation to continue long-term IV cefazolin via PICC until 12/12/24 (6 wks) with eventual transition to PO Duricef long term/indefinitely per ID. MICHAEL was negative for valvular vegetations but did reveal incidental PFO. Hospital course was significant for recurrent fevers, A fib (resolved) with source control and electrolyte correction, initiated on DOAC given elevated [id number]. Patient was evaluated by PM&R and therapy for functional deficits, gait/ADL impairments and acute rehabilitation was recommended. Patient was cleared for discharge to [facility] IRF on 10/30/24.   (30 Oct 2024 13:05)    TDD 11/20/24 to home  ___________________________________________________________________________    SUBJECTIVE/ROS  Patient was seen and evaluated at bedside today.  She reports she had mid back pain around her spine last night that prevented her from sleeping initially, until she got Dilaudid. She states this is the first time in over a week she has had such pain and has required Dilaudid. She slept well once she got Dilaudid and has no pain this morning. She has been weaning off the TLSO brace as per Dr. Stroud's orders, though states she still feels she needs it during therapy to help with pain.   Eager to participate on the recommended rehabilitation program.  Denies any CP, SOB, palpitations, fever, or any other symptoms at this time.   ___________________________________________________________________________    VITALS  T(C): 36.4 (11-15-24 @ 08:37), Max: 36.5 (11-14-24 @ 19:27)  HR: 87 (11-15-24 @ 08:37) (81 - 100)  BP: 151/95 (11-15-24 @ 08:37) (145/82 - 152/84)  RR: 16 (11-15-24 @ 08:37) (16 - 18)  SpO2: 97% (11-15-24 @ 08:37) (96% - 97%)  ___________________________________________________________________________    LABS             9.6    4.66  )-----------( 406      ( 14 Nov 2024 06:01 )             31.9     140  |  104  |  24[H]  ----------------------------<  108[H]  3.9   |  25  |  0.82    Ca    9.2      14 Nov 2024 06:01  TPro  7.3  /  Alb  3.0[L]  /  TBili  0.3  /  DBili  x   /  AST  18  /  ALT  27  /  AlkPhos  192[H]  11-14    ___________________________________________________________________________    MEDICATIONS  (STANDING):  acetaminophen     Tablet .. 975 milliGRAM(s) Oral every 8 hours  apixaban 5 milliGRAM(s) Oral every 12 hours  atorvastatin 40 milliGRAM(s) Oral at bedtime  ceFAZolin   IVPB 2000 milliGRAM(s) IV Intermittent every 8 hours  chlorhexidine 2% Cloths 1 Application(s) Topical daily  cyclobenzaprine 10 milliGRAM(s) Oral three times a day  diclofenac sodium 1% Gel 2 Gram(s) Topical two times a day  DULoxetine 20 milliGRAM(s) Oral daily  ergocalciferol 25360 Unit(s) Oral <User Schedule>  erythromycin    ethylsuccinate Suspension 40 mG/mL 128 milliGRAM(s) Oral every 12 hours  ferrous    sulfate 325 milliGRAM(s) Oral at bedtime  lactobacillus acidophilus 1 Tablet(s) Oral every 12 hours  metoprolol tartrate 50 milliGRAM(s) Oral two times a day  montelukast 10 milliGRAM(s) Oral daily  multivitamin 1 Tablet(s) Oral daily  pramipexole 0.75 milliGRAM(s) Oral <User Schedule>  pregabalin 100 milliGRAM(s) Oral every 8 hours  senna 1 Tablet(s) Oral at bedtime  Vonoprazan (Voquezna) 10mg 1 Tablet(s),Vonoprazan (Voquezna) 10mg 1 Tablet(s) 1 Tablet(s) Oral <User Schedule>    MEDICATIONS  (PRN):  benzocaine/menthol Lozenge 1 Lozenge Oral every 3 hours PRN Mouth Sores  diphenhydrAMINE 25 milliGRAM(s) Oral two times a day PRN Rash and/or Itching  HYDROmorphone   Tablet 4 milliGRAM(s) Oral every 8 hours PRN Severe Pain (7 - 10)  melatonin 6 milliGRAM(s) Oral at bedtime PRN Insomnia    ___________________________________________________________________________    PHYSICAL EXAM:    Constitutional - NAD, Comfortable  Chest - good chest expansion, good respiratory effort, CTAB  Cardio - warm and well perfused  Extremities - No peripheral edema, No calf tenderness   Neurologic Exam:                    Cognitive -             Orientation: Awake, A&O to self, place, date, year, situation     Speech - Fluent, Comprehensible, No dysarthria, No aphasia   Psychiatric - Mood stable, Affect WNL  Skin: incision on back c/d/i and without tenderness    ___________________________________________________________________________ HPI:  Mrs. Haley French is a 74-year-old female patient with past medical history of OA, HTN, gastroparesis, peripheral neuropathy, multiple prior thoracolumbar spine surgeries for congenital scoliosis done >[place] of [place] w/ removal of hardware (1991) who presented to [facility] on 9/28/24 with back pain and left hip pain s/p mechanical fall. CT imaging was performed and reported a T12 three column spine fracture-malalignment with fracture extending to the adjacent right 12th posterior rib and left T12-L1 facet joint and acute, displaced fracture of the left eighth lateral rib. Chronic appearing bilateral rib deformities. Surgical management was recommended and she is S/P T9-L3 open fusion with PSO/lag screw partial corpectomy/interbody by Dr. Stroud, Neurosurgeon with complex Plastic Surgery closure by Dr. Elias on 9/29/24. Post op course c/b CSK leak s/p repair, new onset paroxsymal Afib with RVR,  increased pain and drainage from surgical incision, development of pleural effusions, symptomatic orthostatic hypotension, urinary retention, post op anemia, and chest pain with spontaneous resolution. Cardiac work up negative. Patient was evaluated by PM&R and therapy for functional deficits, gait/ADL impairments and acute rehabilitation was recommended. Patient was cleared for discharge to [facility] IRF on 10/16/24.    Her admission was remarkable for a developing a fever of 103 with tachycardia and subjective fever/chills. Sepsis w/up was initiated and found to have small right upper lobe infiltrate, UTI, and MSSA bacteremia. CTH negative. Hospitalist and ID were consulted and per G+ bacteremia guidelines was recommended completing infectious workup, which includes MICHAEL which is not available at [facility]. Patient also required PICC line placement for long term ABx and a transfer request initiated from [place] to [facility] for MICHAEL and completion of bacteremia workup/treatment. All other medical co-morbidities were stable. Patient was deemed medically stable for discharge to [facility] medicine on 10/21/24 and was subsequently transferred on 10/23/24 for MICHAEL, advanced spine imaging, and further management. She was found to have a paraspinal abscess on MR imaging and is s/p paraspinal collection aspiration on 10/25/24. Culture grew gram positive cocci in pairs, with rare staph aureus. Neurosurgery advised and no acute surgical revision was deemed necessary. Repeat BCx was negative. Recommendation to continue long-term IV cefazolin via PICC until 12/12/24 (6 wks) with eventual transition to PO Duricef long term/indefinitely per ID. MICHAEL was negative for valvular vegetations but did reveal incidental PFO. Hospital course was significant for recurrent fevers, A fib (resolved) with source control and electrolyte correction, initiated on DOAC given elevated [id number]. Patient was evaluated by PM&R and therapy for functional deficits, gait/ADL impairments and acute rehabilitation was recommended. Patient was cleared for discharge to [facility] IRF on 10/30/24.      TDD 11/20/24 to home  ___________________________________________________________________________    SUBJECTIVE/ROS  Patient was seen and evaluated at bedside today.  She reports she had mid back pain around her spine last night that prevented her from sleeping initially, until she got Dilaudid. She states this is the first time in over a week she has had such pain and has required Dilaudid. She slept well once she got Dilaudid and has no pain this morning. She has been weaning off the TLSO brace as per Dr. Stroud's orders, though states she still feels she needs it during therapy to help with pain.   Eager to participate on the recommended rehabilitation program.  Denies any CP, SOB, palpitations, fever, or any other symptoms at this time.   ___________________________________________________________________________    VITALS  T(C): 36.4 (11-15-24 @ 08:37), Max: 36.5 (11-14-24 @ 19:27)  HR: 87 (11-15-24 @ 08:37) (81 - 100)  BP: 151/95 (11-15-24 @ 08:37) (145/82 - 152/84)  RR: 16 (11-15-24 @ 08:37) (16 - 18)  SpO2: 97% (11-15-24 @ 08:37) (96% - 97%)  ___________________________________________________________________________    LABS             9.6    4.66  )-----------( 406      ( 14 Nov 2024 06:01 )             31.9     140  |  104  |  24[H]  ----------------------------<  108[H]  3.9   |  25  |  0.82    Ca    9.2      14 Nov 2024 06:01  TPro  7.3  /  Alb  3.0[L]  /  TBili  0.3  /  DBili  x   /  AST  18  /  ALT  27  /  AlkPhos  192[H]  11-14    ___________________________________________________________________________    MEDICATIONS  (STANDING):  acetaminophen     Tablet .. 975 milliGRAM(s) Oral every 8 hours  apixaban 5 milliGRAM(s) Oral every 12 hours  atorvastatin 40 milliGRAM(s) Oral at bedtime  ceFAZolin   IVPB 2000 milliGRAM(s) IV Intermittent every 8 hours  chlorhexidine 2% Cloths 1 Application(s) Topical daily  cyclobenzaprine 10 milliGRAM(s) Oral three times a day  diclofenac sodium 1% Gel 2 Gram(s) Topical two times a day  DULoxetine 20 milliGRAM(s) Oral daily  ergocalciferol 04848 Unit(s) Oral <User Schedule>  erythromycin    ethylsuccinate Suspension 40 mG/mL 128 milliGRAM(s) Oral every 12 hours  ferrous    sulfate 325 milliGRAM(s) Oral at bedtime  lactobacillus acidophilus 1 Tablet(s) Oral every 12 hours  metoprolol tartrate 50 milliGRAM(s) Oral two times a day  montelukast 10 milliGRAM(s) Oral daily  multivitamin 1 Tablet(s) Oral daily  pramipexole 0.75 milliGRAM(s) Oral <User Schedule>  pregabalin 100 milliGRAM(s) Oral every 8 hours  senna 1 Tablet(s) Oral at bedtime  Vonoprazan (Voquezna) 10mg 1 Tablet(s),Vonoprazan (Voquezna) 10mg 1 Tablet(s) 1 Tablet(s) Oral <User Schedule>    MEDICATIONS  (PRN):  benzocaine/menthol Lozenge 1 Lozenge Oral every 3 hours PRN Mouth Sores  diphenhydrAMINE 25 milliGRAM(s) Oral two times a day PRN Rash and/or Itching  HYDROmorphone   Tablet 4 milliGRAM(s) Oral every 8 hours PRN Severe Pain (7 - 10)  melatonin 6 milliGRAM(s) Oral at bedtime PRN Insomnia    ___________________________________________________________________________    PHYSICAL EXAM:    Constitutional - NAD, Comfortable  Chest - good chest expansion, good respiratory effort, CTAB  Cardio - warm and well perfused  Extremities - No peripheral edema, No calf tenderness   Neurologic Exam:                    Cognitive -             Orientation: Awake, A&O to self, place, date, year, situation     Speech - Fluent, Comprehensible, No dysarthria, No aphasia   Psychiatric - Mood stable, Affect WNL  Skin: incision on back c/d/i and without tenderness    ___________________________________________________________________________

## 2024-11-15 NOTE — PROGRESS NOTE ADULT - NSPROGADDITIONALINFOA_GEN_ALL_CORE
Spent 35 minutes on face-face visit, evaluate, examine and treat
Spent 35 minutes on face-face visit, evaluate, examine and treat, excluding teaching time

## 2024-11-15 NOTE — PROGRESS NOTE ADULT - ASSESSMENT
Assessment/Plan:  Mrs. Haley French is a 74-year-old woman with history of OA, HTN, gastroparesis, peripheral neuropathy, multiple prior thoracolumbar spine surgeries [place]/ [place] of [place] (1991) who is admitted for Acute Inpatient Rehabilitation with a multidisciplinary rehab program at [facility] with functional impairments in ADLs and mobility secondary to mechanical fall reporting a T12 three column spine fracture-malalignment treated surgically with a T9-L3 open fusion with PSO/lag screw partial corpectomy/interbody by Dr. Stroud, Neurosurgeon with complex Plastic Surgery closure by Dr. Elias on 9/29/24 subsequently complicated with MSSA bacteremia secondary to a paraspinal abscess s/p paraspinal collection aspiration on 10/25/24.     #Traumatic T9-L3 vertebral fracture s/p corpectomy and fusion surgery c/b MSSA Bacteremia 2/2 paraspinal abscess  - Paraspinal abscess with Sepsis due to methicillin susceptible Staphylococcus aureus.       * 10/20 Bcx MSSA, 10/21 no growth at 24hrs      * CXR with old rib fracture and small RUL infiltrate      * 10/20 +UA for Ecoli      * MRI: rim-enhancing heterogeneous fluid collection suspicious for an abscess; culture growing GPC in pairs and rare staph aureus       * TTE: no evidence of vegetations      * No Acute surgical intervention per neurosurgery       * S/P paraspinal collection aspiration with paraspinal abscess drain placed with Dr. Guerra 10/25; dislodged accidentally 11/9 with CT showing no retained elements of drain      * Monitor WBC and fever curve      * S/P PICC insertion 10/28      * TLSO to wean as tolerated      * Cefazolin 2000mg IV q8h via PICC until 12/12/24 --> Start PO Duricef 500mg BID on 12/13/24 - long term/indefinitely   - Weekly CBC with Diff, BMP, ESR, and CRP; emailed to [email or web address]  - Activity Limitations: Decreased social, vocational and leisure activities, decreased self care and ADLs, decreased mobility, decreased ability to manage household and finances  - Comprehensive Multidisciplinary Rehab Program:      * 3 hours a day, 5 days a week      * PT 2hr/day: Focused on improving strength, endurance, coordination, balance, functional mobility, and transfers      * OT 1hr/day: Focused on improving strength, fine motor skills, coordination, posture and ADLs.      -----------------------------------------------------------------------------------  Concurrent Medical Problems    #Paroxysmal atrial fibrillation  - metoprolol 50mg PO BID for rate control  - Eliquis 5mg PO BID    #Acute on chronic low back pain.   - S/P T9-L3 open fusion with PSO/lag screw partial corpectomy/interbody with complex plastic closure w/ Dr. Stroud and Dr. Tinajero on 9/29  - TLSO to wean as tolerated as of 11/14 per Dr. Stroud  - Tylenol 975mg PO TID ATC  - Duloxetine 20mg PO daily  - Lyrica 100mg PO TID   - Flexeril 10mg PO TID standing  - diclofenac gel 2g topically BID  - hydromorphone 4mg PO q8h PRN for severe pain    #HTN  #Orthostatic Hypotension  - 10/1 TTE: EF 61%  - S/P 1L IVF bolus (10/13) for symptomatic hypotension with good effect  - metoprolol tartrate 50mg PO BID  - d/reaagn losartan  - Chlorthalidone 25 mg daily on hold (restart if SBP is consistently above 140 per cardio)  - Midodrine 7.5 mg on hold    #HLD  - atorvastatin 40mg PO qhs    #Postoperative anemia  - S/p PRBCs intraoperatively for T spine fusion   - Hgb stable with no evidence of active bleed at this time   - 11/14 Hgb 9.6  - Monitor H/H; transfuse for Hgb <7    #SILVANA (obstructive sleep apnea)  - transient desaturations on RA    - nocturnal 2L O2 and PRN during day during naps, monitor sats   - f/u with PCP outpatient as may benefit from sleep study and CPAP    #Restless legs syndrome (RLS)  - pramipexole 0.75mg PO daily   - started ferrous sulfate 325mg PO daily 11/12    #Seasonal allergies  - montelukast 10mg daily    #Liver cyst  - 10/10 CT Abd/pelvis- There is a large cyst, stable since prior exam. There are small hypodense foci on segment II and segment I too small to characterize, unchanged since prior  - f/u outpatient     #Elevated serum alkaline phosphatase level  - high serum alk phos levels 247 --> 228 --> 172 --> 233 --> 239--> 240--> 247--> 234---> 276 -->227 --> 183 --> 192 11/14  - monitor serum alk phos levels    #Gastritis  - Vonoprazan 10mg PO daily  - erythromycin 128mg oral fluid BID    #Mood/Cognition:  - neuropsychology consult PRN    #Sleep:   - maintain quiet hours and low stim environment  - melatonin 6mg PO qhs PRN to maximize participation in therapy during the day    #GI/Bowel:  - senna 1 tablet PO qhs  - d/reagan Miralax iso soft stools/diarrhea 11/12  - lactobacillus acidophilus OP q12h (probiotic)    #/Bladder:   - PVR q 8 hours (SC if >400cc)    #Skin/Pressure Injury:   - skin assessment on admission: Midline sine dressing with drain in place, with minimal drainage. No pressure injuries    #Diet/Supplement  Current Diet: Regular  - ergocalciferol 72849M PO weekly on Fridays  - multivitamin PO daily     #Precautions / PROPHYLAXIS:   - Falls, Spinal  - ortho: Weight bearing status: WBAT, TLSO brace OOB  - Lungs: Incentive Spirometer   - DVT ppx: SCDs, TEDs, Eliquis 5mg PO BID  - Pressure injury/Skin:  OOB to Chair, PT/OT      ---------------  Code Status: FULL  Emergency Contact:  ___________________________________________________________________________    Outpatient Follow-up:    Fran Carr  Internal Medicine  [address] [place] [address] [address]  [place] [address]  Phone: [phone]  Fax: [phone]  Follow Up Time:     Kaushal Waterman  Infectious Disease  [address] [place] [id number]  Phone: [phone]  Follow Up Time:  [facility]  Neurosurgery  Referral Assistance Program    Guille Lance  [facility]  ORTHOSURG [address]  Scheduled Appointment: 11/22/2024    Catalino Cotto  [facility]  WEIGHTMGMT  [facility]  Scheduled Appointment: 12/05/2024    Haydee Bernstein  [facility]  GASTRO [address]  Scheduled Appointment: 01/06/2025      [facility] - Infectious Disease  Infectious Disease  [address] [place] [address], [place] [address]  Phone: [phone]    -------------- Assessment/Plan:  Mrs. Haley French is a 74-year-old woman with history of OA, HTN, gastroparesis, peripheral neuropathy, multiple prior thoracolumbar spine surgeries for congenital scoliosis done [place] w/ removal of hardware (1991) who is admitted for Acute Inpatient Rehabilitation with a multidisciplinary rehab program at [facility] with functional impairments in ADLs and mobility secondary to mechanical fall reporting a T12 three column spine fracture-malalignment treated surgically with a T9-L3 open fusion with PSO/lag screw partial corpectomy/interbody by Dr. Stroud, Neurosurgeon with complex Plastic Surgery closure by Dr. Elias on 9/29/24 subsequently complicated with MSSA bacteremia secondary to a paraspinal abscess s/p paraspinal collection aspiration on 10/25/24.     #Traumatic T9-L3 vertebral fracture s/p corpectomy and fusion surgery c/b MSSA Bacteremia 2/2 paraspinal abscess  - Paraspinal abscess with Sepsis due to methicillin susceptible Staphylococcus aureus.       * 10/20 Bcx MSSA, 10/21 no growth at 24hrs      * CXR with old rib fracture and small RUL infiltrate      * 10/20 +UA for Ecoli      * MRI: rim-enhancing heterogeneous fluid collection suspicious for an abscess; culture growing GPC in pairs and rare staph aureus       * TTE: no evidence of vegetations      * No Acute surgical intervention per neurosurgery       * S/P paraspinal collection aspiration with paraspinal abscess drain placed with Dr. Guerra 10/25; dislodged accidentally 11/9 with CT showing no retained elements of drain      * Monitor WBC and fever curve      * S/P PICC insertion 10/28      * TLSO to wean as tolerated      * Cefazolin 2000mg IV q8h via PICC until 12/12/24 --> Start PO Duricef 500mg BID on 12/13/24 - long term/indefinitely   - Weekly CBC with Diff, BMP, ESR, and CRP; emailed to [email or web address]  - Activity Limitations: Decreased social, vocational and leisure activities, decreased self care and ADLs, decreased mobility, decreased ability to manage household and finances  - Comprehensive Multidisciplinary Rehab Program:      * 3 hours a day, 5 days a week      * PT 2hr/day: Focused on improving strength, endurance, coordination, balance, functional mobility, and transfers      * OT 1hr/day: Focused on improving strength, fine motor skills, coordination, posture and ADLs.      -----------------------------------------------------------------------------------  Concurrent Medical Problems    #Paroxysmal atrial fibrillation  - metoprolol 50mg PO BID for rate control  - Eliquis 5mg PO BID    #Acute on chronic low back pain.   - S/P T9-L3 open fusion with PSO/lag screw partial corpectomy/interbody with complex plastic closure w/ Dr. Stroud and Dr. Tinajero on 9/29  - TLSO to wean as tolerated as of 11/14 per Dr. Stroud  - Tylenol 975mg PO TID ATC  - Duloxetine 20mg PO daily  - Lyrica 100mg PO TID   - Flexeril 10mg PO TID standing  - diclofenac gel 2g topically BID  - hydromorphone 4mg PO q8h PRN for severe pain    #HTN  #Orthostatic Hypotension  - 10/1 TTE: EF 61%  - S/P 1L IVF bolus (10/13) for symptomatic hypotension with good effect  - metoprolol tartrate 50mg PO BID  - d/reagan losartan  - Chlorthalidone 25 mg daily on hold (restart if SBP is consistently above 140 per cardio)  - Midodrine 7.5 mg on hold    #HLD  - atorvastatin 40mg PO qhs    #Postoperative anemia  - S/p PRBCs intraoperatively for T spine fusion   - Hgb stable with no evidence of active bleed at this time   - 11/14 Hgb 9.6  - Monitor H/H; transfuse for Hgb <7    #SILVANA (obstructive sleep apnea)  - transient desaturations on RA    - nocturnal 2L O2 and PRN during day during naps, monitor sats   - f/u with PCP outpatient as may benefit from sleep study and CPAP    #Restless legs syndrome (RLS)  - pramipexole 0.75mg PO daily   - started ferrous sulfate 325mg PO daily 11/12    #Seasonal allergies  - montelukast 10mg daily    #Liver cyst  - 10/10 CT Abd/pelvis- There is a large cyst, stable since prior exam. There are small hypodense foci on segment II and segment I too small to characterize, unchanged since prior  - f/u outpatient     #Elevated serum alkaline phosphatase level  - high serum alk phos levels 247 --> 228 --> 172 --> 233 --> 239--> 240--> 247--> 234---> 276 -->227 --> 183 --> 192 11/14  - monitor serum alk phos levels    #Gastritis  - Vonoprazan 10mg PO daily  - erythromycin 128mg oral fluid BID    #Mood/Cognition:  - neuropsychology consult PRN    #Sleep:   - maintain quiet hours and low stim environment  - melatonin 6mg PO qhs PRN to maximize participation in therapy during the day    #GI/Bowel:  - senna 1 tablet PO qhs  - d/reagan Miralax iso soft stools/diarrhea 11/12  - lactobacillus acidophilus OP q12h (probiotic)    #/Bladder:   - PVR q 8 hours (SC if >400cc)  -Voiding     #Skin/Pressure Injury:   - skin assessment on admission: Midline sine dressing with drain in place, with minimal drainage. No pressure injuries    #Diet/Supplement  Current Diet: Regular  - ergocalciferol 30224N PO weekly on Fridays  - multivitamin PO daily     #Precautions / PROPHYLAXIS:   - Falls, Spinal  - ortho: Weight bearing status: WBAT, TLSO brace OOB  - Lungs: Incentive Spirometer   - DVT ppx: SCDs, TEDs, Eliquis 5mg PO BID  - Pressure injury/Skin:  OOB to Chair, PT/OT      ---------------  Code Status: FULL  Emergency Contact:  ___________________________________________________________________________    Outpatient Follow-up:    Fran Carr  Internal Medicine  [address] [facility] [address]  [place] [address]  Phone: [phone]  Fax: [phone]  Follow Up Time:     Kaushal Waterman  Infectious Disease  [address] [place] [id number]  Phone: [phone]  Follow Up Time:  [facility] Neurosurgery  Neurosurgery  Referral Assistance Program    Guille Lance  [facility]  ORTHOSURG [address]  Scheduled Appointment: 11/22/2024    Catalino Cotto  [facility]  WEIGHTMGMT  [facility]  Scheduled Appointment: 12/05/2024    Haydee Bernstein  [facility]  GASTRO [address]  Scheduled Appointment: 01/06/2025      [facility] - Infectious Disease  Infectious Disease  [address] [facility], [place] [address]  Phone: [phone]    --------------

## 2024-11-16 PROCEDURE — 99232 SBSQ HOSP IP/OBS MODERATE 35: CPT

## 2024-11-16 RX ORDER — SENNOSIDES 8.6 MG
2 TABLET ORAL AT BEDTIME
Refills: 0 | Status: DISCONTINUED | OUTPATIENT
Start: 2024-11-16 | End: 2024-11-20

## 2024-11-16 RX ORDER — POLYETHYLENE GLYCOL 3350 17 G/17G
17 POWDER, FOR SOLUTION ORAL
Refills: 0 | Status: DISCONTINUED | OUTPATIENT
Start: 2024-11-16 | End: 2024-11-20

## 2024-11-16 RX ORDER — METOPROLOL TARTRATE 100 MG/1
100 TABLET, FILM COATED ORAL
Refills: 0 | Status: DISCONTINUED | OUTPATIENT
Start: 2024-11-16 | End: 2024-11-20

## 2024-11-16 RX ADMIN — APIXABAN 5 MILLIGRAM(S): 2.5 TABLET, FILM COATED ORAL at 05:17

## 2024-11-16 RX ADMIN — Medication 2 TABLET(S): at 21:14

## 2024-11-16 RX ADMIN — ACETAMINOPHEN 500MG 975 MILLIGRAM(S): 500 TABLET, COATED ORAL at 05:17

## 2024-11-16 RX ADMIN — PREGABALIN 100 MILLIGRAM(S): 75 CAPSULE ORAL at 05:20

## 2024-11-16 RX ADMIN — Medication 10 MILLIGRAM(S): at 13:07

## 2024-11-16 RX ADMIN — Medication 100 MILLIGRAM(S): at 21:14

## 2024-11-16 RX ADMIN — Medication 20 MILLIGRAM(S): at 11:16

## 2024-11-16 RX ADMIN — MONTELUKAST SODIUM 10 MILLIGRAM(S): 10 TABLET ORAL at 11:16

## 2024-11-16 RX ADMIN — HYDROMORPHONE HYDROCHLORIDE 4 MILLIGRAM(S): 2 TABLET ORAL at 13:08

## 2024-11-16 RX ADMIN — PREGABALIN 100 MILLIGRAM(S): 75 CAPSULE ORAL at 21:14

## 2024-11-16 RX ADMIN — Medication 10 MILLIGRAM(S): at 05:17

## 2024-11-16 RX ADMIN — METOPROLOL TARTRATE 50 MILLIGRAM(S): 100 TABLET, FILM COATED ORAL at 05:19

## 2024-11-16 RX ADMIN — DICLOFENAC SODIUM 2 GRAM(S): 20 SOLUTION TOPICAL at 17:37

## 2024-11-16 RX ADMIN — METOPROLOL TARTRATE 100 MILLIGRAM(S): 100 TABLET, FILM COATED ORAL at 17:35

## 2024-11-16 RX ADMIN — PREGABALIN 100 MILLIGRAM(S): 75 CAPSULE ORAL at 13:08

## 2024-11-16 RX ADMIN — HYDROMORPHONE HYDROCHLORIDE 4 MILLIGRAM(S): 2 TABLET ORAL at 14:08

## 2024-11-16 RX ADMIN — DICLOFENAC SODIUM 2 GRAM(S): 20 SOLUTION TOPICAL at 05:21

## 2024-11-16 RX ADMIN — Medication 1 TABLET(S): at 11:16

## 2024-11-16 RX ADMIN — Medication 25 MILLIGRAM(S): at 21:15

## 2024-11-16 RX ADMIN — APIXABAN 5 MILLIGRAM(S): 2.5 TABLET, FILM COATED ORAL at 17:35

## 2024-11-16 RX ADMIN — Medication 100 MILLIGRAM(S): at 13:07

## 2024-11-16 RX ADMIN — CHLORHEXIDINE GLUCONATE 1 APPLICATION(S): 1.2 RINSE ORAL at 11:44

## 2024-11-16 RX ADMIN — Medication 1 TABLET(S): at 05:18

## 2024-11-16 RX ADMIN — Medication 100 MILLIGRAM(S): at 05:17

## 2024-11-16 RX ADMIN — ACETAMINOPHEN 500MG 975 MILLIGRAM(S): 500 TABLET, COATED ORAL at 14:07

## 2024-11-16 RX ADMIN — Medication 1 TABLET(S): at 17:35

## 2024-11-16 RX ADMIN — Medication 325 MILLIGRAM(S): at 21:14

## 2024-11-16 RX ADMIN — ACETAMINOPHEN 500MG 975 MILLIGRAM(S): 500 TABLET, COATED ORAL at 13:07

## 2024-11-16 RX ADMIN — PRAMIPEXOLE 0.75 MILLIGRAM(S): 1.5 TABLET, EXTENDED RELEASE ORAL at 15:58

## 2024-11-16 RX ADMIN — ACETAMINOPHEN 500MG 975 MILLIGRAM(S): 500 TABLET, COATED ORAL at 21:14

## 2024-11-16 RX ADMIN — Medication 10 MILLIGRAM(S): at 21:14

## 2024-11-16 RX ADMIN — HYDROMORPHONE HYDROCHLORIDE 4 MILLIGRAM(S): 2 TABLET ORAL at 21:15

## 2024-11-16 RX ADMIN — Medication 40 MILLIGRAM(S): at 21:14

## 2024-11-16 NOTE — PROGRESS NOTE ADULT - SUBJECTIVE AND OBJECTIVE BOX
PROGRESS NOTE:     Patient is a 74y old  Female who presents with a chief complaint of Traumatic T9-L3 vertebral fracture s/p corpectomy and fusion surgery c/b MSSA Bacteremia 2/2 paraspinal abscess (13 Nov 2024 07:20)          SUBJECTIVE & OBJECTIVE:   Pt seen and examined at bedside in AM    no overnight events.       REVIEW OF SYSTEMS: remaining ROS negative     PHYSICAL EXAM:  VITALS:  Vital Signs Last 24 Hrs  T(C): 36.3 (16 Nov 2024 08:32), Max: 36.9 (16 Nov 2024 05:11)  T(F): 97.4 (16 Nov 2024 08:32), Max: 98.5 (16 Nov 2024 05:11)  HR: 89 (16 Nov 2024 08:32) (89 - 99)  BP: 134/78 (16 Nov 2024 08:32) (129/74 - 158/79)  BP(mean): --  RR: 15 (16 Nov 2024 08:32) (15 - 16)  SpO2: 96% (16 Nov 2024 08:32) (94% - 97%)    Parameters below as of 16 Nov 2024 08:32  Patient On (Oxygen Delivery Method): room air        GENERAL: NAD,  no increased WOB  HEAD:  Atraumatic, Normocephalic  EYES: EOMI, conjunctiva and sclera clear  ENMT: Moist mucous membranes  NECK: Supple, No JVD  NERVOUS SYSTEM:  Alert & Oriented   CHEST/LUNG: Clear to auscultation bilaterally; No rales, rhonchi, wheezing.   HEART: Regular rate and rhythm  ABDOMEN: Soft, Nontender, Nondistended; Bowel sounds present  EXTREMITIES:  No clubbing, cyanosis, calf tenderness or edema b/l      MEDICATIONS  (STANDING):  acetaminophen     Tablet .. 975 milliGRAM(s) Oral every 8 hours  apixaban 5 milliGRAM(s) Oral every 12 hours  atorvastatin 40 milliGRAM(s) Oral at bedtime  ceFAZolin   IVPB 2000 milliGRAM(s) IV Intermittent every 8 hours  chlorhexidine 2% Cloths 1 Application(s) Topical daily  cyclobenzaprine 10 milliGRAM(s) Oral three times a day  diclofenac sodium 1% Gel 2 Gram(s) Topical two times a day  DULoxetine 20 milliGRAM(s) Oral daily  ergocalciferol 30011 Unit(s) Oral <User Schedule>  erythromycin    ethylsuccinate Suspension 40 mG/mL 128 milliGRAM(s) Oral every 12 hours  ferrous    sulfate 325 milliGRAM(s) Oral at bedtime  lactobacillus acidophilus 1 Tablet(s) Oral every 12 hours  metoprolol tartrate 100 milliGRAM(s) Oral two times a day  montelukast 10 milliGRAM(s) Oral daily  multivitamin 1 Tablet(s) Oral daily  pramipexole 0.75 milliGRAM(s) Oral <User Schedule>  pregabalin 100 milliGRAM(s) Oral every 8 hours  senna 1 Tablet(s) Oral at bedtime  Vonoprazan (Voquezna) 10mg 1 Tablet(s),Vonoprazan (Voquezna) 10mg 1 Tablet(s) 1 Tablet(s) Oral <User Schedule>    MEDICATIONS  (PRN):  benzocaine/menthol Lozenge 1 Lozenge Oral every 3 hours PRN Mouth Sores  diphenhydrAMINE 25 milliGRAM(s) Oral two times a day PRN Rash and/or Itching  HYDROmorphone   Tablet 4 milliGRAM(s) Oral every 8 hours PRN Severe Pain (7 - 10)  melatonin 6 milliGRAM(s) Oral at bedtime PRN Insomnia          Allergies    Dilantin (Urticaria)  penicillins (Urticaria)    Intolerances    erythromycin (Stomach Upset; Vomiting; Nausea)          LABS:                                   9.6    4.66  )-----------( 406      ( 14 Nov 2024 06:01 )             31.9     11-14    140  |  104  |  24[H]  ----------------------------<  108[H]  3.9   |  25  |  0.82    Ca    9.2      14 Nov 2024 06:01    TPro  7.3  /  Alb  3.0[L]  /  TBili  0.3  /  DBili  x   /  AST  18  /  ALT  27  /  AlkPhos  192[H]  11-14    LIVER FUNCTIONS - ( 14 Nov 2024 06:01 )  Alb: 3.0 g/dL / Pro: 7.3 g/dL / ALK PHOS: 192 U/L / ALT: 27 U/L / AST: 18 U/L / GGT: x             Urinalysis Basic - ( 14 Nov 2024 06:01 )    Color: x / Appearance: x / SG: x / pH: x  Gluc: 108 mg/dL / Ketone: x  / Bili: x / Urobili: x   Blood: x / Protein: x / Nitrite: x   Leuk Esterase: x / RBC: x / WBC x   Sq Epi: x / Non Sq Epi: x / Bacteria: x        COVID-19 PCR: NotDetec (10-30-24 @ 21:45)  COVID-19 PCR: NotDetec (10-21-24 @ 09:20)  COVID-19 PCR: NotDetec (10-17-24 @ 16:34)    Consultant(s) Notes Reviewed:   Care Discused with Consultants/Other Providers:  Imaging Personally Reviewed:

## 2024-11-16 NOTE — PROGRESS NOTE ADULT - SUBJECTIVE AND OBJECTIVE BOX
HPI:  Patient seen and examined, no acute overnight events. Slept well. States she is working hard with her therapists. Feels mildly sore like she had a good workout. No further sharp episodes of back pain.   Pain controlled, no chest pain, no N/V, no Fevers/Chills. No other new ROS  Has been tolerating rehabilitation program.      MEDICATIONS:  acetaminophen     Tablet .. 975 milliGRAM(s) Oral every 8 hours  apixaban 5 milliGRAM(s) Oral every 12 hours  atorvastatin 40 milliGRAM(s) Oral at bedtime  benzocaine/menthol Lozenge 1 Lozenge Oral every 3 hours PRN  ceFAZolin   IVPB 2000 milliGRAM(s) IV Intermittent every 8 hours  chlorhexidine 2% Cloths 1 Application(s) Topical daily  cyclobenzaprine 10 milliGRAM(s) Oral three times a day  diclofenac sodium 1% Gel 2 Gram(s) Topical two times a day  diphenhydrAMINE 25 milliGRAM(s) Oral two times a day PRN  DULoxetine 20 milliGRAM(s) Oral daily  ergocalciferol 24740 Unit(s) Oral <User Schedule>  erythromycin    ethylsuccinate Suspension 40 mG/mL 128 milliGRAM(s) Oral every 12 hours  ferrous    sulfate 325 milliGRAM(s) Oral at bedtime  HYDROmorphone   Tablet 4 milliGRAM(s) Oral every 8 hours PRN  lactobacillus acidophilus 1 Tablet(s) Oral every 12 hours  melatonin 6 milliGRAM(s) Oral at bedtime PRN  metoprolol tartrate 100 milliGRAM(s) Oral two times a day  montelukast 10 milliGRAM(s) Oral daily  multivitamin 1 Tablet(s) Oral daily  polyethylene glycol 3350 17 Gram(s) Oral two times a day PRN  pramipexole 0.75 milliGRAM(s) Oral <User Schedule>  pregabalin 100 milliGRAM(s) Oral every 8 hours  senna 2 Tablet(s) Oral at bedtime  Vonoprazan (Voquezna) 10mg 1 Tablet(s),Vonoprazan (Voquezna) 10mg 1 Tablet(s) 1 Tablet(s) Oral <User Schedule>      VITALS:  T(C): 36.3 (11-16-24 @ 08:32), Max: 36.9 (11-16-24 @ 05:11)  HR: 89 (11-16-24 @ 08:32) (89 - 99)  BP: 134/78 (11-16-24 @ 08:32) (129/74 - 158/79)  RR: 15 (11-16-24 @ 08:32) (15 - 16)  SpO2: 96% (11-16-24 @ 08:32) (94% - 97%)    PHYSICAL EXAM:  In NAD  HEENT- NCAT  Heart- RRR, S1S2  Lungs- CTA bl.  Abd- + BS, NT  Ext- No calf pain  Neuro- Exam unchanged          Imp: Patient with diagnosis of    Traumatic T9-L3 vertebral fracture s/p corpectomy and fusion surgery c/b MSSA Bacteremia 2/2 paraspinal     admitted for comprehensive acute rehabilitation.    Plan:    #Traumatic T9-L3 vertebral fracture s/p corpectomy and fusion surgery c/b MSSA Bacteremia 2/2 paraspinal abscess  - Paraspinal abscess with Sepsis due to methicillin susceptible Staphylococcus aureus.       * 10/20 Bcx MSSA, 10/21 no growth at 24hrs      * CXR with old rib fracture and small RUL infiltrate      * 10/20 +UA for Ecoli      * MRI: rim-enhancing heterogeneous fluid collection suspicious for an abscess; culture growing GPC in pairs and rare staph aureus       * TTE: no evidence of vegetations      * No Acute surgical intervention per neurosurgery       * S/P paraspinal collection aspiration with paraspinal abscess drain placed with Dr. Guerra 10/25; dislodged accidentally 11/9 with CT showing no retained elements of drain      * Monitor WBC and fever curve      * S/P PICC insertion 10/28      * TLSO to wean as tolerated      * Cefazolin 2000mg IV q8h via PICC until 12/12/24 --> Start PO Duricef 500mg BID on 12/13/24 - long term/indefinitely   - Weekly CBC with Diff, BMP, ESR, and CRP; emailed to [email or web address] PT/OT/SLP    #HTN  #Orthostatic Hypotension  - 10/1 TTE: EF 61%  - S/P 1L IVF bolus (10/13) for symptomatic hypotension with good effect  - metoprolol tartrate 50mg PO BID  - d/reagan losartan  - Chlorthalidone 25 mg daily on hold (restart if SBP is consistently above 140 per cardio)  - Midodrine 7.5 mg on hold    #Paroxysmal atrial fibrillation  - metoprolol 50mg PO BID for rate control  - Eliquis 5mg PO BID    #Restless legs syndrome (RLS)  - pramipexole 0.75mg PO daily   - started ferrous sulfate 325mg PO daily 11/12    #Pain  - Tylenol 975mg PO TID ATC  - Duloxetine 20mg PO daily  - Lyrica 100mg PO TID   - Flexeril 10mg PO TID standing  - diclofenac gel 2g topically BID  - hydromorphone 4mg PO q8h PRN for severe pain    #Elevated serum alkaline phosphatase level  - high serum alk phos levels 247 --> 228 --> 172 --> 233 --> 239--> 240--> 247--> 234---> 276 -->227 --> 183 --> 192 11/14  - monitor serum alk phos levels    #Gastritis  - Vonoprazan 10mg PO daily  - erythromycin 128mg oral fluid BID    #Sleep:   - maintain quiet hours and low stim environment  - melatonin 6mg PO qhs PRN to maximize participation in therapy during the day    #GI/Bowel:  - senna 1 tablet PO qhs  - d/reagan Miralax iso soft stools/diarrhea 11/12  - lactobacillus acidophilus OP q12h (probiotic)    #Skin/Pressure Injury:   - skin assessment on admission: midline incision site skin well approximated.     - Continue current medications; patient medically stable.   - Patient is stable to continue current rehabilitation program.

## 2024-11-16 NOTE — PROGRESS NOTE ADULT - ASSESSMENT
74 year old female with PMHx of OA, HTN, gastroparesis, peripheral neuropathy, multiple prior thoracolumbar spine surgeries for congenital scoliosis [place] >[place]/ [place] (1991) and recent fall with T11-T12 fracture, s/p thoracolumbar posterior fusion for t spine fx with plastics closure on  9/29 with Dr. Stroud and Dr. Funk, c/b post op csf leak, new onset paroxysmal Afib with RVR, increased post op pain and drainage from incision site, pleural effusions, symptomatic orthostatic hypotension, urinary retention, post op anemia, and CP with spontaneous resolution. Patient was sent to [facility] rehab on 10/17, c/b AMS, sepsis with high fever, rigors, tachycardia on 10/20 found to have MSSA bacteremia with right upper lobe infiltrate, and UTI. CT spine no report of collection. Urine +ve E coli, transferred back to [facility] on 10/23 for MICHAEL, advanced spine imaging, and further management, found to have paraspinal abscess on MR imaging, S/p paraspinal collection aspiration on 10/25, culture growing gram positive cocci in pairs, with rare staph aureus. Neurosurgery advised no acute surgical revision necessary, repeat BCx negative. To continue long-term IV cefazolin via PICC until 12/12/24 (6 wks) with eventual transition to PO Duricef long term/indefinitely as per ID. MICHAEL was negative for valvular vegetations but did reveal incidental PFO. Hospital course significant for recurrent fevers, A fib, started AC. Now at [facility] for rehab.     Traumatic T9-L3 Vertebral Fracture s/p Corpectomy and Fusion Surgery 9/29  MSSA Bacteremia 2/2 Paraspinal Abscess   - 10/20 Bcx MSSA, repeat blood cultures 10/21 and 10/23 negative   -  MRI: rim-enhancing heterogeneous fluid collection suspicious for an abscess; culture growing GPC in pairs and rare staph aureus   - TTE: no evidence of vegetations  - No Acute surgical intervention per neurosurgery   - s/p paraspinal collection aspiration 10/25 with Dr. Guerra, fluid culture grew MSSA  - continue Cefazolin 2g q8h via PICC until 12/12, then start Duricef 500mg bid indefinitely   - Weekly CBC, BMP, ESR and CRP  - TLSO when OOB   - Pain control and bowel regimen per rehab team   - RADHA drain dislodeged on 11/9, CT T/L Spine 11/9 showed Long segment spinal fusion as described. Improved paraspinal collections. No retained drain fragment identified.  - Continue comprehensive rehab program with PT/OT  - Outpatient follow up with spine surgeon     Hypertension  Orthostasis   - Metoprolol increased to 100mg BID  - Continue to hold chlorthalidone and losartan   - Monitor BP    HLD  -Continue atorvastatin     Paroxysmal A-fib  -Continue Eliquis AC   -metoprolol increased to 100mg bid    Post-Op Anemia  -H/H stable  -Started on ferrous sulfate   -Monitor CBC     Gastritis  Gastroparesis   - Continue vonoprazan and erythromycin susp    RLS  -Continue pramipexole     Seasonal Allergies  -Continue montelukast     Hypokalemia   -s/p repletion   -Monitor BMP    Loose Stool Likely 2/2 Laxatives   -Miralax discontinued and senna reduced to 1 tab QHS per primary team.   -Will continue to monitor.    DVT Prophylaxis:  - Eliquis

## 2024-11-17 PROCEDURE — 99232 SBSQ HOSP IP/OBS MODERATE 35: CPT

## 2024-11-17 RX ORDER — LOSARTAN POTASSIUM 100 MG/1
25 TABLET, FILM COATED ORAL DAILY
Refills: 0 | Status: DISCONTINUED | OUTPATIENT
Start: 2024-11-17 | End: 2024-11-20

## 2024-11-17 RX ADMIN — Medication 10 MILLIGRAM(S): at 21:46

## 2024-11-17 RX ADMIN — ACETAMINOPHEN 500MG 975 MILLIGRAM(S): 500 TABLET, COATED ORAL at 22:20

## 2024-11-17 RX ADMIN — PREGABALIN 100 MILLIGRAM(S): 75 CAPSULE ORAL at 21:46

## 2024-11-17 RX ADMIN — MONTELUKAST SODIUM 10 MILLIGRAM(S): 10 TABLET ORAL at 11:08

## 2024-11-17 RX ADMIN — Medication 100 MILLIGRAM(S): at 05:21

## 2024-11-17 RX ADMIN — Medication 10 MILLIGRAM(S): at 05:22

## 2024-11-17 RX ADMIN — HYDROMORPHONE HYDROCHLORIDE 4 MILLIGRAM(S): 2 TABLET ORAL at 13:12

## 2024-11-17 RX ADMIN — ACETAMINOPHEN 500MG 975 MILLIGRAM(S): 500 TABLET, COATED ORAL at 21:46

## 2024-11-17 RX ADMIN — ACETAMINOPHEN 500MG 975 MILLIGRAM(S): 500 TABLET, COATED ORAL at 13:10

## 2024-11-17 RX ADMIN — Medication 325 MILLIGRAM(S): at 21:47

## 2024-11-17 RX ADMIN — Medication 40 MILLIGRAM(S): at 21:47

## 2024-11-17 RX ADMIN — PREGABALIN 100 MILLIGRAM(S): 75 CAPSULE ORAL at 05:24

## 2024-11-17 RX ADMIN — DICLOFENAC SODIUM 2 GRAM(S): 20 SOLUTION TOPICAL at 17:36

## 2024-11-17 RX ADMIN — Medication 100 MILLIGRAM(S): at 13:09

## 2024-11-17 RX ADMIN — APIXABAN 5 MILLIGRAM(S): 2.5 TABLET, FILM COATED ORAL at 05:21

## 2024-11-17 RX ADMIN — PRAMIPEXOLE 0.75 MILLIGRAM(S): 1.5 TABLET, EXTENDED RELEASE ORAL at 15:18

## 2024-11-17 RX ADMIN — ACETAMINOPHEN 500MG 975 MILLIGRAM(S): 500 TABLET, COATED ORAL at 05:22

## 2024-11-17 RX ADMIN — METOPROLOL TARTRATE 100 MILLIGRAM(S): 100 TABLET, FILM COATED ORAL at 05:25

## 2024-11-17 RX ADMIN — PREGABALIN 100 MILLIGRAM(S): 75 CAPSULE ORAL at 13:11

## 2024-11-17 RX ADMIN — Medication 1 TABLET(S): at 17:08

## 2024-11-17 RX ADMIN — Medication 20 MILLIGRAM(S): at 11:08

## 2024-11-17 RX ADMIN — Medication 2 TABLET(S): at 21:46

## 2024-11-17 RX ADMIN — HYDROMORPHONE HYDROCHLORIDE 4 MILLIGRAM(S): 2 TABLET ORAL at 14:12

## 2024-11-17 RX ADMIN — LOSARTAN POTASSIUM 25 MILLIGRAM(S): 100 TABLET, FILM COATED ORAL at 10:15

## 2024-11-17 RX ADMIN — APIXABAN 5 MILLIGRAM(S): 2.5 TABLET, FILM COATED ORAL at 17:08

## 2024-11-17 RX ADMIN — Medication 10 MILLIGRAM(S): at 13:09

## 2024-11-17 RX ADMIN — Medication 1 TABLET(S): at 05:21

## 2024-11-17 RX ADMIN — CHLORHEXIDINE GLUCONATE 1 APPLICATION(S): 1.2 RINSE ORAL at 12:36

## 2024-11-17 RX ADMIN — Medication 100 MILLIGRAM(S): at 21:47

## 2024-11-17 RX ADMIN — Medication 1 TABLET(S): at 11:08

## 2024-11-17 RX ADMIN — ACETAMINOPHEN 500MG 975 MILLIGRAM(S): 500 TABLET, COATED ORAL at 14:10

## 2024-11-17 NOTE — PROGRESS NOTE ADULT - SUBJECTIVE AND OBJECTIVE BOX
PROGRESS NOTE:     Patient is a 74y old  Female who presents with a chief complaint of Traumatic T9-L3 vertebral fracture s/p corpectomy and fusion surgery c/b MSSA Bacteremia 2/2 paraspinal abscess (13 Nov 2024 07:20)          SUBJECTIVE & OBJECTIVE:   Pt seen and examined at bedside in AM    no overnight events.       REVIEW OF SYSTEMS: remaining ROS negative     PHYSICAL EXAM:  VITALS:  Vital Signs Last 24 Hrs  T(C): 36.7 (17 Nov 2024 08:06), Max: 36.8 (16 Nov 2024 19:22)  T(F): 98 (17 Nov 2024 08:06), Max: 98.3 (16 Nov 2024 19:22)  HR: 90 (17 Nov 2024 08:06) (87 - 96)  BP: 152/79 (17 Nov 2024 08:06) (121/74 - 167/79)  BP(mean): --  RR: 15 (17 Nov 2024 08:06) (15 - 16)  SpO2: 95% (17 Nov 2024 08:06) (94% - 97%)    Parameters below as of 17 Nov 2024 08:06  Patient On (Oxygen Delivery Method): room air        GENERAL: NAD,  no increased WOB  HEAD:  Atraumatic, Normocephalic  EYES: EOMI, conjunctiva and sclera clear  ENMT: Moist mucous membranes  NECK: Supple, No JVD  NERVOUS SYSTEM:  Alert & Oriented   CHEST/LUNG: Clear to auscultation bilaterally; No rales, rhonchi, wheezing.   HEART: Regular rate and rhythm  ABDOMEN: Soft, Nontender, Nondistended; Bowel sounds present  EXTREMITIES:  No clubbing, cyanosis, calf tenderness or edema b/l      MEDICATIONS  (STANDING):  acetaminophen     Tablet .. 975 milliGRAM(s) Oral every 8 hours  apixaban 5 milliGRAM(s) Oral every 12 hours  atorvastatin 40 milliGRAM(s) Oral at bedtime  ceFAZolin   IVPB 2000 milliGRAM(s) IV Intermittent every 8 hours  chlorhexidine 2% Cloths 1 Application(s) Topical daily  cyclobenzaprine 10 milliGRAM(s) Oral three times a day  diclofenac sodium 1% Gel 2 Gram(s) Topical two times a day  DULoxetine 20 milliGRAM(s) Oral daily  ergocalciferol 45043 Unit(s) Oral <User Schedule>  erythromycin    ethylsuccinate Suspension 40 mG/mL 128 milliGRAM(s) Oral every 12 hours  ferrous    sulfate 325 milliGRAM(s) Oral at bedtime  lactobacillus acidophilus 1 Tablet(s) Oral every 12 hours  losartan 25 milliGRAM(s) Oral daily  metoprolol tartrate 100 milliGRAM(s) Oral two times a day  montelukast 10 milliGRAM(s) Oral daily  multivitamin 1 Tablet(s) Oral daily  pramipexole 0.75 milliGRAM(s) Oral <User Schedule>  pregabalin 100 milliGRAM(s) Oral every 8 hours  senna 2 Tablet(s) Oral at bedtime  Vonoprazan (Voquezna) 10mg 1 Tablet(s),Vonoprazan (Voquezna) 10mg 1 Tablet(s) 1 Tablet(s) Oral <User Schedule>    MEDICATIONS  (PRN):  benzocaine/menthol Lozenge 1 Lozenge Oral every 3 hours PRN Mouth Sores  diphenhydrAMINE 25 milliGRAM(s) Oral two times a day PRN Rash and/or Itching  HYDROmorphone   Tablet 4 milliGRAM(s) Oral every 8 hours PRN Severe Pain (7 - 10)  melatonin 6 milliGRAM(s) Oral at bedtime PRN Insomnia  polyethylene glycol 3350 17 Gram(s) Oral two times a day PRN Constipation        Allergies    Dilantin (Urticaria)  penicillins (Urticaria)    Intolerances    erythromycin (Stomach Upset; Vomiting; Nausea)          LABS:                                   9.6    4.66  )-----------( 406      ( 14 Nov 2024 06:01 )             31.9     11-14    140  |  104  |  24[H]  ----------------------------<  108[H]  3.9   |  25  |  0.82    Ca    9.2      14 Nov 2024 06:01    TPro  7.3  /  Alb  3.0[L]  /  TBili  0.3  /  DBili  x   /  AST  18  /  ALT  27  /  AlkPhos  192[H]  11-14    LIVER FUNCTIONS - ( 14 Nov 2024 06:01 )  Alb: 3.0 g/dL / Pro: 7.3 g/dL / ALK PHOS: 192 U/L / ALT: 27 U/L / AST: 18 U/L / GGT: x             Urinalysis Basic - ( 14 Nov 2024 06:01 )    Color: x / Appearance: x / SG: x / pH: x  Gluc: 108 mg/dL / Ketone: x  / Bili: x / Urobili: x   Blood: x / Protein: x / Nitrite: x   Leuk Esterase: x / RBC: x / WBC x   Sq Epi: x / Non Sq Epi: x / Bacteria: x        COVID-19 PCR: NotDetec (10-30-24 @ 21:45)  COVID-19 PCR: NotDetec (10-21-24 @ 09:20)  COVID-19 PCR: NotDetec (10-17-24 @ 16:34)    Consultant(s) Notes Reviewed:   Care Discused with Consultants/Other Providers:  Imaging Personally Reviewed:

## 2024-11-17 NOTE — PROGRESS NOTE ADULT - ASSESSMENT
74 year old female with PMHx of OA, HTN, gastroparesis, peripheral neuropathy, multiple prior thoracolumbar spine surgeries for congenital scoliosis [place] >[place] w/ removal of hardware (1991) and recent fall with T11-T12 fracture, s/p thoracolumbar posterior fusion for t spine fx with plastics closure on  9/29 with Dr. Stroud and Dr. Funk, c/b post op csf leak, new onset paroxysmal Afib with RVR, increased post op pain and drainage from incision site, pleural effusions, symptomatic orthostatic hypotension, urinary retention, post op anemia, and CP with spontaneous resolution. Patient was sent to [facility] rehab on 10/17, c/b AMS, sepsis with high fever, rigors, tachycardia on 10/20 found to have MSSA bacteremia with right upper lobe infiltrate, and UTI. CT spine no report of collection. Urine +ve E coli, transferred back to [facility] on 10/23 for MICHAEL, advanced spine imaging, and further management, found to have paraspinal abscess on MR imaging, S/p paraspinal collection aspiration on 10/25, culture growing gram positive cocci in pairs, with rare staph aureus. Neurosurgery advised no acute surgical revision necessary, repeat BCx negative. To continue long-term IV cefazolin via PICC until 12/12/24 (6 wks) with eventual transition to PO Duricef long term/indefinitely as per ID. MICHAEL was negative for valvular vegetations but did reveal incidental PFO. Hospital course significant for recurrent fevers, A fib, started AC. Now at [facility] for rehab.     Traumatic T9-L3 Vertebral Fracture s/p Corpectomy and Fusion Surgery 9/29  MSSA Bacteremia 2/2 Paraspinal Abscess   - 10/20 Bcx MSSA, repeat blood cultures 10/21 and 10/23 negative   -  MRI: rim-enhancing heterogeneous fluid collection suspicious for an abscess; culture growing GPC in pairs and rare staph aureus   - TTE: no evidence of vegetations  - No Acute surgical intervention per neurosurgery   - s/p paraspinal collection aspiration 10/25 with Dr. Guerra, fluid culture grew MSSA  - continue Cefazolin 2g q8h via PICC until 12/12, then start Duricef 500mg bid indefinitely   - Weekly CBC, BMP, ESR and CRP  - TLSO when OOB   - Pain control and bowel regimen per rehab team   - RADHA drain dislodeged on 11/9, CT T/L Spine 11/9 showed Long segment spinal fusion as described. Improved paraspinal collections. No retained drain fragment identified.  - Continue comprehensive rehab program with PT/OT  - Outpatient follow up with spine surgeon     Hypertension  Orthostasis   - Metoprolol increased to 100mg BID  - Start on low dose losartan 25mg  - Continue to hold chlorthalidone   - Monitor BP    HLD  -Continue atorvastatin     Paroxysmal A-fib  -Continue Eliquis AC   -metoprolol increased to 100mg bid    Post-Op Anemia  -H/H stable  -Started on ferrous sulfate   -Monitor CBC     Gastritis  Gastroparesis   - Continue vonoprazan and erythromycin susp    RLS  -Continue pramipexole     Seasonal Allergies  -Continue montelukast     Hypokalemia   -s/p repletion   -Monitor BMP    Loose Stool Likely 2/2 Laxatives   -Miralax discontinued and senna reduced to 1 tab QHS per primary team.   -Will continue to monitor.    DVT Prophylaxis:  - Eliquis

## 2024-11-17 NOTE — PROGRESS NOTE ADULT - SUBJECTIVE AND OBJECTIVE BOX
HPI:  Patient seen and examined, no acute overnight events. Slept well. States she is working hard with her therapists. Feels mildly sore like she had a good workout. No further sharp episodes of back pain.   Pain controlled, no chest pain, no N/V, no Fevers/Chills. No other new ROS  Has been tolerating rehabilitation program.      MEDICATIONS:  acetaminophen     Tablet .. 975 milliGRAM(s) Oral every 8 hours  apixaban 5 milliGRAM(s) Oral every 12 hours  atorvastatin 40 milliGRAM(s) Oral at bedtime  benzocaine/menthol Lozenge 1 Lozenge Oral every 3 hours PRN  ceFAZolin   IVPB 2000 milliGRAM(s) IV Intermittent every 8 hours  chlorhexidine 2% Cloths 1 Application(s) Topical daily  cyclobenzaprine 10 milliGRAM(s) Oral three times a day  diclofenac sodium 1% Gel 2 Gram(s) Topical two times a day  diphenhydrAMINE 25 milliGRAM(s) Oral two times a day PRN  DULoxetine 20 milliGRAM(s) Oral daily  ergocalciferol 42069 Unit(s) Oral <User Schedule>  erythromycin    ethylsuccinate Suspension 40 mG/mL 128 milliGRAM(s) Oral every 12 hours  ferrous    sulfate 325 milliGRAM(s) Oral at bedtime  HYDROmorphone   Tablet 4 milliGRAM(s) Oral every 8 hours PRN  lactobacillus acidophilus 1 Tablet(s) Oral every 12 hours  losartan 25 milliGRAM(s) Oral daily  melatonin 6 milliGRAM(s) Oral at bedtime PRN  metoprolol tartrate 100 milliGRAM(s) Oral two times a day  montelukast 10 milliGRAM(s) Oral daily  multivitamin 1 Tablet(s) Oral daily  polyethylene glycol 3350 17 Gram(s) Oral two times a day PRN  pramipexole 0.75 milliGRAM(s) Oral <User Schedule>  pregabalin 100 milliGRAM(s) Oral every 8 hours  senna 2 Tablet(s) Oral at bedtime  Vonoprazan (Voquezna) 10mg 1 Tablet(s),Vonoprazan (Voquezna) 10mg 1 Tablet(s) 1 Tablet(s) Oral <User Schedule>          T(C): 36.7 (11-17-24 @ 08:06), Max: 36.8 (11-16-24 @ 19:22)  T(F): 98 (11-17-24 @ 08:06), Max: 98.3 (11-16-24 @ 19:22)  HR: 90 (11-17-24 @ 08:06) (87 - 96)  BP: 152/79 (11-17-24 @ 08:06) (121/74 - 167/79)  RR: 15 (11-17-24 @ 08:06) (15 - 16)  SpO2: 95% (11-17-24 @ 08:06) (94% - 97%)          PHYSICAL EXAM:  In NAD  HEENT- NCAT  Heart- RRR, S1S2  Lungs- CTA bl.  Abd- + BS, NT  Ext- No calf pain  Neuro- Exam unchanged      LABS             9.6    4.66  )-----------( 406      ( 14 Nov 2024 06:01 )             31.9     140  |  104  |  24[H]  ----------------------------<  108[H]  3.9   |  25  |  0.82    Ca    9.2      14 Nov 2024 06:01  TPro  7.3  /  Alb  3.0[L]  /  TBili  0.3  /  DBili  x   /  AST  18  /  ALT  27  /  AlkPhos  192[H]  11-14        Imp: Patient with diagnosis of    Traumatic T9-L3 vertebral fracture s/p corpectomy and fusion surgery c/b MSSA Bacteremia 2/2 paraspinal     admitted for comprehensive acute rehabilitation.  RECENT LABS/IMAGING            Plan:    #Traumatic T9-L3 vertebral fracture s/p corpectomy and fusion surgery c/b MSSA Bacteremia 2/2 paraspinal abscess  - Paraspinal abscess with Sepsis due to methicillin susceptible Staphylococcus aureus.       * 10/20 Bcx MSSA, 10/21 no growth at 24hrs      * CXR with old rib fracture and small RUL infiltrate      * 10/20 +UA for Ecoli      * MRI: rim-enhancing heterogeneous fluid collection suspicious for an abscess; culture growing GPC in pairs and rare staph aureus       * TTE: no evidence of vegetations      * No Acute surgical intervention per neurosurgery       * S/P paraspinal collection aspiration with paraspinal abscess drain placed with Dr. Guerra 10/25; dislodged accidentally 11/9 with CT showing no retained elements of drain      * Monitor WBC and fever curve      * S/P PICC insertion 10/28      * TLSO to wean as tolerated      * Cefazolin 2000mg IV q8h via PICC until 12/12/24 --> Start PO Duricef 500mg BID on 12/13/24 - long term/indefinitely   - Weekly CBC with Diff, BMP, ESR, and CRP; emailed to [email or web address] Continue PT/OT/SLP    #HTN  #Orthostatic Hypotension  - 10/1 TTE: EF 61%  - S/P 1L IVF bolus (10/13) for symptomatic hypotension with good effect  - metoprolol tartrate 50mg PO BID  - d/reagan losartan  - Chlorthalidone 25 mg daily on hold (restart if SBP is consistently above 140 per cardio)  - Midodrine 7.5 mg on hold    #Paroxysmal atrial fibrillation  - metoprolol 50mg PO BID for rate control  - Eliquis 5mg PO BID    #Restless legs syndrome (RLS)  - pramipexole 0.75mg PO daily   - started ferrous sulfate 325mg PO daily 11/12    #Pain  - Tylenol 975mg PO TID ATC  - Duloxetine 20mg PO daily  - Lyrica 100mg PO TID   - Flexeril 10mg PO TID standing  - diclofenac gel 2g topically BID  - hydromorphone 4mg PO q8h PRN for severe pain    #Elevated serum alkaline phosphatase level  - high serum alk phos levels 247 --> 228 --> 172 --> 233 --> 239--> 240--> 247--> 234---> 276 -->227 --> 183 --> 192 11/14  - monitor serum alk phos levels    #Gastritis  - Vonoprazan 10mg PO daily  - erythromycin 128mg oral fluid BID    #Sleep:   - maintain quiet hours and low stim environment  - melatonin 6mg PO qhs PRN to maximize participation in therapy during the day    #GI/Bowel:  - senna 1 tablet PO qhs  - d/reagan Miralax iso soft stools/diarrhea 11/12  - lactobacillus acidophilus OP q12h (probiotic)    #Skin/Pressure Injury:   - skin assessment on admission: midline incision site skin well approximated.     - Continue current medications; patient medically stable.   - Patient is stable to continue current rehabilitation program.

## 2024-11-18 LAB
ALBUMIN SERPL ELPH-MCNC: 2.9 G/DL — LOW (ref 3.3–5)
ALP SERPL-CCNC: 191 U/L — HIGH (ref 40–120)
ALT FLD-CCNC: 24 U/L — SIGNIFICANT CHANGE UP (ref 10–45)
ANION GAP SERPL CALC-SCNC: 11 MMOL/L — SIGNIFICANT CHANGE UP (ref 5–17)
AST SERPL-CCNC: 22 U/L — SIGNIFICANT CHANGE UP (ref 10–40)
BASOPHILS # BLD AUTO: 0.05 K/UL — SIGNIFICANT CHANGE UP (ref 0–0.2)
BASOPHILS NFR BLD AUTO: 1 % — SIGNIFICANT CHANGE UP (ref 0–2)
BILIRUB SERPL-MCNC: 0.3 MG/DL — SIGNIFICANT CHANGE UP (ref 0.2–1.2)
BUN SERPL-MCNC: 21 MG/DL — SIGNIFICANT CHANGE UP (ref 7–23)
CALCIUM SERPL-MCNC: 9.1 MG/DL — SIGNIFICANT CHANGE UP (ref 8.4–10.5)
CHLORIDE SERPL-SCNC: 102 MMOL/L — SIGNIFICANT CHANGE UP (ref 96–108)
CO2 SERPL-SCNC: 26 MMOL/L — SIGNIFICANT CHANGE UP (ref 22–31)
CREAT SERPL-MCNC: 0.78 MG/DL — SIGNIFICANT CHANGE UP (ref 0.5–1.3)
CRP SERPL-MCNC: 33 MG/L — HIGH
EGFR: 78 ML/MIN/1.73M2 — SIGNIFICANT CHANGE UP
EOSINOPHIL # BLD AUTO: 0.15 K/UL — SIGNIFICANT CHANGE UP (ref 0–0.5)
EOSINOPHIL NFR BLD AUTO: 2.9 % — SIGNIFICANT CHANGE UP (ref 0–6)
ERYTHROCYTE [SEDIMENTATION RATE] IN BLOOD: 95 MM/HR — HIGH (ref 0–20)
GLUCOSE SERPL-MCNC: 128 MG/DL — HIGH (ref 70–99)
HCT VFR BLD CALC: 31.8 % — LOW (ref 34.5–45)
HGB BLD-MCNC: 9.6 G/DL — LOW (ref 11.5–15.5)
IMM GRANULOCYTES NFR BLD AUTO: 0.4 % — SIGNIFICANT CHANGE UP (ref 0–0.9)
LYMPHOCYTES # BLD AUTO: 1.6 K/UL — SIGNIFICANT CHANGE UP (ref 1–3.3)
LYMPHOCYTES # BLD AUTO: 30.9 % — SIGNIFICANT CHANGE UP (ref 13–44)
MCHC RBC-ENTMCNC: 26 PG — LOW (ref 27–34)
MCHC RBC-ENTMCNC: 30.2 G/DL — LOW (ref 32–36)
MCV RBC AUTO: 86.2 FL — SIGNIFICANT CHANGE UP (ref 80–100)
MONOCYTES # BLD AUTO: 0.39 K/UL — SIGNIFICANT CHANGE UP (ref 0–0.9)
MONOCYTES NFR BLD AUTO: 7.5 % — SIGNIFICANT CHANGE UP (ref 2–14)
NEUTROPHILS # BLD AUTO: 2.96 K/UL — SIGNIFICANT CHANGE UP (ref 1.8–7.4)
NEUTROPHILS NFR BLD AUTO: 57.3 % — SIGNIFICANT CHANGE UP (ref 43–77)
NRBC # BLD: 0 /100 WBCS — SIGNIFICANT CHANGE UP (ref 0–0)
PLATELET # BLD AUTO: 355 K/UL — SIGNIFICANT CHANGE UP (ref 150–400)
POTASSIUM SERPL-MCNC: 4.2 MMOL/L — SIGNIFICANT CHANGE UP (ref 3.5–5.3)
POTASSIUM SERPL-SCNC: 4.2 MMOL/L — SIGNIFICANT CHANGE UP (ref 3.5–5.3)
PROT SERPL-MCNC: 7.1 G/DL — SIGNIFICANT CHANGE UP (ref 6–8.3)
RBC # BLD: 3.69 M/UL — LOW (ref 3.8–5.2)
RBC # FLD: 15.7 % — HIGH (ref 10.3–14.5)
SODIUM SERPL-SCNC: 139 MMOL/L — SIGNIFICANT CHANGE UP (ref 135–145)
WBC # BLD: 5.17 K/UL — SIGNIFICANT CHANGE UP (ref 3.8–10.5)
WBC # FLD AUTO: 5.17 K/UL — SIGNIFICANT CHANGE UP (ref 3.8–10.5)

## 2024-11-18 PROCEDURE — 99232 SBSQ HOSP IP/OBS MODERATE 35: CPT

## 2024-11-18 RX ORDER — HYDROMORPHONE HYDROCHLORIDE 2 MG/1
1 TABLET ORAL
Qty: 90 | Refills: 0
Start: 2024-11-18 | End: 2024-12-17

## 2024-11-18 RX ORDER — METOPROLOL TARTRATE 100 MG/1
1 TABLET, FILM COATED ORAL
Qty: 60 | Refills: 0
Start: 2024-11-18 | End: 2024-12-17

## 2024-11-18 RX ORDER — FERROUS SULFATE 325(65) MG
1 TABLET ORAL
Qty: 30 | Refills: 0
Start: 2024-11-18 | End: 2024-12-17

## 2024-11-18 RX ORDER — ACETAMINOPHEN 500MG 500 MG/1
3 TABLET, COATED ORAL
Qty: 270 | Refills: 0
Start: 2024-11-18 | End: 2024-12-17

## 2024-11-18 RX ORDER — ERGOCALCIFEROL (VITAMIN D2) 200 MCG/ML
1 DROPS ORAL
Qty: 4 | Refills: 0
Start: 2024-11-18 | End: 2024-12-17

## 2024-11-18 RX ORDER — PRAMIPEXOLE 1.5 MG/1
1 TABLET, EXTENDED RELEASE ORAL
Qty: 30 | Refills: 0
Start: 2024-11-18 | End: 2024-12-17

## 2024-11-18 RX ORDER — VONOPRAZAN FUMARATE 26.72 MG/1
1 TABLET ORAL
Qty: 0 | Refills: 0 | DISCHARGE

## 2024-11-18 RX ORDER — DULOXETINE HCL 60 MG
1 CAPSULE,DELAYED RELEASE (ENTERIC COATED) ORAL
Qty: 30 | Refills: 0
Start: 2024-11-18 | End: 2024-12-17

## 2024-11-18 RX ORDER — DICLOFENAC SODIUM 20 MG/G
1 SOLUTION TOPICAL
Qty: 1 | Refills: 0
Start: 2024-11-18 | End: 2024-12-17

## 2024-11-18 RX ORDER — PREGABALIN 75 MG/1
1 CAPSULE ORAL
Qty: 90 | Refills: 0
Start: 2024-11-18 | End: 2024-12-17

## 2024-11-18 RX ORDER — VONOPRAZAN FUMARATE 13.36 MG/1
1 TABLET ORAL
Qty: 30 | Refills: 0
Start: 2024-11-18 | End: 2024-12-17

## 2024-11-18 RX ORDER — HYDROMORPHONE HYDROCHLORIDE 2 MG/1
1 TABLET ORAL
Qty: 21 | Refills: 0
Start: 2024-11-18 | End: 2024-11-24

## 2024-11-18 RX ORDER — LOSARTAN POTASSIUM 100 MG/1
1 TABLET, FILM COATED ORAL
Qty: 30 | Refills: 0
Start: 2024-11-18 | End: 2024-12-17

## 2024-11-18 RX ORDER — APIXABAN 2.5 MG/1
1 TABLET, FILM COATED ORAL
Qty: 60 | Refills: 0 | DISCHARGE
Start: 2024-11-18 | End: 2024-12-17

## 2024-11-18 RX ORDER — MONTELUKAST SODIUM 10 MG/1
1 TABLET ORAL
Qty: 30 | Refills: 0
Start: 2024-11-18 | End: 2024-12-17

## 2024-11-18 RX ORDER — PREGABALIN 75 MG/1
100 CAPSULE ORAL EVERY 8 HOURS
Refills: 0 | Status: DISCONTINUED | OUTPATIENT
Start: 2024-11-18 | End: 2024-11-20

## 2024-11-18 RX ORDER — SENNOSIDES 8.6 MG
2 TABLET ORAL
Qty: 60 | Refills: 0
Start: 2024-11-18 | End: 2024-12-17

## 2024-11-18 RX ORDER — HYDROMORPHONE HYDROCHLORIDE 2 MG/1
4 TABLET ORAL EVERY 8 HOURS
Refills: 0 | Status: DISCONTINUED | OUTPATIENT
Start: 2024-11-18 | End: 2024-11-20

## 2024-11-18 RX ORDER — CYCLOBENZAPRINE HCL 10 MG
1 TABLET ORAL
Qty: 90 | Refills: 0
Start: 2024-11-18 | End: 2024-12-17

## 2024-11-18 RX ORDER — APIXABAN 2.5 MG/1
1 TABLET, FILM COATED ORAL
Qty: 60 | Refills: 0
Start: 2024-11-18 | End: 2024-12-17

## 2024-11-18 RX ADMIN — Medication 10 MILLIGRAM(S): at 14:45

## 2024-11-18 RX ADMIN — Medication 20 MILLIGRAM(S): at 12:16

## 2024-11-18 RX ADMIN — ACETAMINOPHEN 500MG 975 MILLIGRAM(S): 500 TABLET, COATED ORAL at 21:55

## 2024-11-18 RX ADMIN — PRAMIPEXOLE 0.75 MILLIGRAM(S): 1.5 TABLET, EXTENDED RELEASE ORAL at 14:46

## 2024-11-18 RX ADMIN — Medication 10 MILLIGRAM(S): at 05:15

## 2024-11-18 RX ADMIN — Medication 325 MILLIGRAM(S): at 21:55

## 2024-11-18 RX ADMIN — DICLOFENAC SODIUM 2 GRAM(S): 20 SOLUTION TOPICAL at 05:28

## 2024-11-18 RX ADMIN — Medication 100 MILLIGRAM(S): at 05:16

## 2024-11-18 RX ADMIN — Medication 100 MILLIGRAM(S): at 14:43

## 2024-11-18 RX ADMIN — Medication 2 TABLET(S): at 21:54

## 2024-11-18 RX ADMIN — ACETAMINOPHEN 500MG 975 MILLIGRAM(S): 500 TABLET, COATED ORAL at 15:45

## 2024-11-18 RX ADMIN — HYDROMORPHONE HYDROCHLORIDE 4 MILLIGRAM(S): 2 TABLET ORAL at 09:14

## 2024-11-18 RX ADMIN — Medication 10 MILLIGRAM(S): at 21:54

## 2024-11-18 RX ADMIN — Medication 1 TABLET(S): at 12:16

## 2024-11-18 RX ADMIN — PREGABALIN 100 MILLIGRAM(S): 75 CAPSULE ORAL at 21:59

## 2024-11-18 RX ADMIN — Medication 40 MILLIGRAM(S): at 21:54

## 2024-11-18 RX ADMIN — Medication 1 TABLET(S): at 05:15

## 2024-11-18 RX ADMIN — HYDROMORPHONE HYDROCHLORIDE 4 MILLIGRAM(S): 2 TABLET ORAL at 08:32

## 2024-11-18 RX ADMIN — METOPROLOL TARTRATE 100 MILLIGRAM(S): 100 TABLET, FILM COATED ORAL at 05:15

## 2024-11-18 RX ADMIN — ACETAMINOPHEN 500MG 975 MILLIGRAM(S): 500 TABLET, COATED ORAL at 14:44

## 2024-11-18 RX ADMIN — APIXABAN 5 MILLIGRAM(S): 2.5 TABLET, FILM COATED ORAL at 19:34

## 2024-11-18 RX ADMIN — PREGABALIN 100 MILLIGRAM(S): 75 CAPSULE ORAL at 14:47

## 2024-11-18 RX ADMIN — HYDROMORPHONE HYDROCHLORIDE 4 MILLIGRAM(S): 2 TABLET ORAL at 16:46

## 2024-11-18 RX ADMIN — Medication 1 TABLET(S): at 19:36

## 2024-11-18 RX ADMIN — ACETAMINOPHEN 500MG 975 MILLIGRAM(S): 500 TABLET, COATED ORAL at 05:15

## 2024-11-18 RX ADMIN — MONTELUKAST SODIUM 10 MILLIGRAM(S): 10 TABLET ORAL at 12:16

## 2024-11-18 RX ADMIN — PREGABALIN 100 MILLIGRAM(S): 75 CAPSULE ORAL at 05:14

## 2024-11-18 RX ADMIN — DICLOFENAC SODIUM 2 GRAM(S): 20 SOLUTION TOPICAL at 19:39

## 2024-11-18 RX ADMIN — Medication 100 MILLIGRAM(S): at 21:55

## 2024-11-18 RX ADMIN — LOSARTAN POTASSIUM 25 MILLIGRAM(S): 100 TABLET, FILM COATED ORAL at 05:15

## 2024-11-18 RX ADMIN — ACETAMINOPHEN 500MG 975 MILLIGRAM(S): 500 TABLET, COATED ORAL at 05:50

## 2024-11-18 RX ADMIN — APIXABAN 5 MILLIGRAM(S): 2.5 TABLET, FILM COATED ORAL at 05:15

## 2024-11-18 NOTE — CHART NOTE - NSCHARTNOTEFT_GEN_A_CORE
Notified by RN that patient's pigtail drain had come out while taking patient to the bathroom. For the past few days minimum serosanguineous drainage present. Covered site with sterile dressing. Will s/o to dayteam.
Nutrition Follow Up Note  Hospital Course   (Per Electronic Medical Record)    Source:  Patient ASleepx2  Medical Record [X]      Diet:   Regular Diet (IDDSI Level 7) w/ Thin Liquids (IDDSI Level 0)  Tolerates Diet Consistency Well  Consumes % of Meals (as Per Documentation)    Enteral/Parenteral Nutrition: Not Applicable    Current Weight: 204.5lb on 10/30  Obtain New Weight  Obtain Weights Weekly     Pertinent Medications: MEDICATIONS  (STANDING):  acetaminophen     Tablet .. 975 milliGRAM(s) Oral every 8 hours  apixaban 5 milliGRAM(s) Oral every 12 hours  atorvastatin 40 milliGRAM(s) Oral at bedtime  ceFAZolin   IVPB 2000 milliGRAM(s) IV Intermittent every 8 hours  chlorhexidine 2% Cloths 1 Application(s) Topical daily  cyclobenzaprine 10 milliGRAM(s) Oral three times a day  DULoxetine 20 milliGRAM(s) Oral daily  ergocalciferol 33326 Unit(s) Oral <User Schedule>  erythromycin    ethylsuccinate Suspension 40 mG/mL 128 milliGRAM(s) Oral every 12 hours  metoprolol tartrate 50 milliGRAM(s) Oral two times a day  montelukast 10 milliGRAM(s) Oral daily  multivitamin 1 Tablet(s) Oral daily  polyethylene glycol 3350 17 Gram(s) Oral two times a day  pramipexole 0.75 milliGRAM(s) Oral <User Schedule>  pregabalin 100 milliGRAM(s) Oral every 8 hours  senna 2 Tablet(s) Oral at bedtime  Vonoprazan (Voquezna) 10mg 1 Tablet(s),Vonoprazan (Voquezna) 10mg 1 Tablet(s) 1 Tablet(s) Oral <User Schedule>    MEDICATIONS  (PRN):  benzocaine/menthol Lozenge 1 Lozenge Oral every 3 hours PRN Mouth Sores  diphenhydrAMINE 25 milliGRAM(s) Oral two times a day PRN Rash and/or Itching  HYDROmorphone   Tablet 4 milliGRAM(s) Oral every 8 hours PRN Severe Pain (7 - 10)  melatonin 6 milliGRAM(s) Oral at bedtime PRN Insomnia    Pertinent Labs:  11-04 Na137 mmol/L Glu 107 mg/dL[H] K+ 3.8 mmol/L Cr  0.75 mg/dL BUN 13 mg/dL 11-04 Alb 2.7 g/dL[L]    Skin: No Pressure Ulcers  Multiple Surgical Incisions  (as Per Nursing Flow Sheet)     Edema: None Noted (as Per Documentation)     Last Bowel Movement: on 11/3    Estimated Needs:   [X] No Change Since Previous Assessment    Previous Nutrition Diagnosis:   Obese    Nutrition Diagnosis is [X] Ongoing - Continue Nutrition Plan of Care     New Nutrition Diagnosis: [X] Not Applicable    Interventions:   1. Recommend Continue Nutrition Plan of Care     Monitoring & Evaluation:   [X] Weights   [X] PO Intake   [X] Skin Integrity   [X] Follow Up (Per Protocol)  [X] Tolerance to Diet Prescription   [X] Other: Labs     Registered Dietitian/Nutritionist Remains Available.  Danilo Kirby, MADDY, CDN    Phone# [phone]
Nutrition Follow Up Note  Hospital Course   (Per Electronic Medical Record)    Source:  Patient [X]  Medical Record [X]      Diet:   Regular Diet (IDDSI Level 7) w/ Thin Liquids (IDDSI Level 0)  Tolerates Diet Consistency Well  No Chewing/Swallowing Difficulties  No Recent Nausea, Vomiting, Diarrhea or Constipation (as Per Patient)  Consumes % of Meals (as Per Documentation) - State Good PO Intake/Appetite (Per Patient)  Education Provided on Proper Nutrition    Enteral/Parenteral Nutrition: Not Applicable    Current Weight: 191lb on 11/17  Obtain New Weight to Confirm  Obtain Weights Weekly     Pertinent Medications: MEDICATIONS  (STANDING):  acetaminophen     Tablet .. 975 milliGRAM(s) Oral every 8 hours  apixaban 5 milliGRAM(s) Oral every 12 hours  atorvastatin 40 milliGRAM(s) Oral at bedtime  ceFAZolin   IVPB 2000 milliGRAM(s) IV Intermittent every 8 hours  chlorhexidine 2% Cloths 1 Application(s) Topical daily  cyclobenzaprine 10 milliGRAM(s) Oral three times a day  diclofenac sodium 1% Gel 2 Gram(s) Topical two times a day  DULoxetine 20 milliGRAM(s) Oral daily  ergocalciferol 48555 Unit(s) Oral <User Schedule>  erythromycin    ethylsuccinate Suspension 40 mG/mL 128 milliGRAM(s) Oral every 12 hours  ferrous    sulfate 325 milliGRAM(s) Oral at bedtime  lactobacillus acidophilus 1 Tablet(s) Oral every 12 hours  losartan 25 milliGRAM(s) Oral daily  metoprolol tartrate 100 milliGRAM(s) Oral two times a day  montelukast 10 milliGRAM(s) Oral daily  multivitamin 1 Tablet(s) Oral daily  pramipexole 0.75 milliGRAM(s) Oral <User Schedule>  pregabalin 100 milliGRAM(s) Oral every 8 hours  senna 2 Tablet(s) Oral at bedtime  Vonoprazan (Voquezna) 10mg 1 Tablet(s),Vonoprazan (Voquezna) 10mg 1 Tablet(s) 1 Tablet(s) Oral <User Schedule>    MEDICATIONS  (PRN):  benzocaine/menthol Lozenge 1 Lozenge Oral every 3 hours PRN Mouth Sores  diphenhydrAMINE 25 milliGRAM(s) Oral two times a day PRN Rash and/or Itching  HYDROmorphone   Tablet 4 milliGRAM(s) Oral every 8 hours PRN Severe Pain (7 - 10)  melatonin 6 milliGRAM(s) Oral at bedtime PRN Insomnia  polyethylene glycol 3350 17 Gram(s) Oral two times a day PRN Constipation    Pertinent Labs:  11-18 Na139 mmol/L Glu 128 mg/dL[H] K+ 4.2 mmol/L Cr  0.78 mg/dL BUN 21 mg/dL 11-18 Alb 2.9 g/dL[L]    Skin: No Pressure Ulcers  Multiple Surgical Incisions  (as Per Nursing Flow Sheet)     Edema: None Noted (as Per Documentation)     Last Bowel Movement: on 11/18    Estimated Needs:   [X] No Change Since Previous Assessment    Previous Nutrition Diagnosis:   Obese    Nutrition Diagnosis is [X] Ongoing - Education Provided on Proper Nutrition     New Nutrition Diagnosis: [X] Not Applicable    Interventions:   1. Education Provided on Proper Nutrition   2. Recommend Continue Nutrition Plan of Care     Monitoring & Evaluation:   [X] Weights   [X] PO Intake   [X] Skin Integrity   [X] Follow Up (Per Protocol)  [X] Tolerance to Diet Prescription   [X] Other: Labs     Registered Dietitian/Nutritionist Remains Available.  Danilo Kirby RDN, CDN    Phone# [phone]
Nutrition Follow Up Note  Hospital Course   (Per Electronic Medical Record)    Source:  Patient [X]  Medical Record [X]      Diet:   Regular Diet (IDDSI Level 7) w/ Thin Liquids (IDDSI Level 0)  Tolerates Diet Consistency Well  No Chewing/Swallowing Difficulties  No Recent Nausea, Vomiting, Diarrhea or Constipation (as Per Patient)  Consumes % of Meals (as Per Documentation) - States Good PO Intake/Appetite (Per Patient)    Enteral/Parenteral Nutrition: Not Applicable    Current Weight: 187.8lb on 11/10  Obtain New Weight to Confirm  Obtain Weights Weekly     Pertinent Medications: MEDICATIONS  (STANDING):  acetaminophen     Tablet .. 975 milliGRAM(s) Oral every 8 hours  apixaban 5 milliGRAM(s) Oral every 12 hours  atorvastatin 40 milliGRAM(s) Oral at bedtime  ceFAZolin   IVPB 2000 milliGRAM(s) IV Intermittent every 8 hours  chlorhexidine 2% Cloths 1 Application(s) Topical daily  cyclobenzaprine 10 milliGRAM(s) Oral three times a day  diclofenac sodium 1% Gel 2 Gram(s) Topical two times a day  DULoxetine 20 milliGRAM(s) Oral daily  ergocalciferol 96013 Unit(s) Oral <User Schedule>  erythromycin    ethylsuccinate Suspension 40 mG/mL 128 milliGRAM(s) Oral every 12 hours  metoprolol tartrate 50 milliGRAM(s) Oral two times a day  montelukast 10 milliGRAM(s) Oral daily  multivitamin 1 Tablet(s) Oral daily  polyethylene glycol 3350 17 Gram(s) Oral two times a day  pramipexole 0.75 milliGRAM(s) Oral <User Schedule>  pregabalin 100 milliGRAM(s) Oral every 8 hours  senna 2 Tablet(s) Oral at bedtime  Vonoprazan (Voquezna) 10mg 1 Tablet(s),Vonoprazan (Voquezna) 10mg 1 Tablet(s) 1 Tablet(s) Oral <User Schedule>    MEDICATIONS  (PRN):  benzocaine/menthol Lozenge 1 Lozenge Oral every 3 hours PRN Mouth Sores  diphenhydrAMINE 25 milliGRAM(s) Oral two times a day PRN Rash and/or Itching  HYDROmorphone   Tablet 4 milliGRAM(s) Oral every 8 hours PRN Severe Pain (7 - 10)  melatonin 6 milliGRAM(s) Oral at bedtime PRN Insomnia    Pertinent Labs:   11-07 Alb 3.1 g/dL[L]    Skin: No Pressure Ulcers  Multiple Surgical Incisions  (as Per Nursing Flow Sheet)     Edema: None Noted (as Per Documentation)     Last Bowel Movement: on 11/9    Estimated Needs:   [X] No Change Since Previous Assessment    Previous Nutrition Diagnosis:   Obese    Nutrition Diagnosis is [X] Ongoing - Continue Nutrition Plan of Care     New Nutrition Diagnosis: [X] Not Applicable    Interventions:   1. Recommend Continue Nutrition Plan of Care     Monitoring & Evaluation:   [X] Weights   [X] PO Intake   [X] Skin Integrity   [X] Follow Up (Per Protocol)  [X] Tolerance to Diet Prescription   [X] Other: Labs     Registered Dietitian/Nutritionist Remains Available.  Danilo Kirby RDN, CDN    Phone# [phone]

## 2024-11-18 NOTE — PROGRESS NOTE ADULT - ASSESSMENT
74 year old female with PMHx of OA, HTN, gastroparesis, peripheral neuropathy, multiple prior thoracolumbar spine surgeries for congenital scoliosis [place] w/ [place] of hardware (1991) and recent fall with T11-T12 fracture, s/p thoracolumbar posterior fusion for t spine fx with plastics closure on  9/29 with Dr. Stroud and Dr. Funk, c/b post op csf leak, new onset paroxysmal Afib with RVR, increased post op pain and drainage from incision site, pleural effusions, symptomatic orthostatic hypotension, urinary retention, post op anemia, and CP with spontaneous resolution. Patient was sent to [facility] rehab on 10/17, c/b AMS, sepsis with high fever, rigors, tachycardia on 10/20 found to have MSSA bacteremia with right upper lobe infiltrate, and UTI. CT spine no report of collection. Urine +ve E coli, transferred back to [facility] on 10/23 for MICHAEL, advanced spine imaging, and further management, found to have paraspinal abscess on MR imaging, S/p paraspinal collection aspiration on 10/25, culture growing gram positive cocci in pairs, with rare staph aureus. Neurosurgery advised no acute surgical revision necessary, repeat BCx negative. To continue long-term IV cefazolin via PICC until 12/12/24 (6 wks) with eventual transition to PO Duricef long term/indefinitely as per ID. MICHAEL was negative for valvular vegetations but did reveal incidental PFO. Hospital course significant for recurrent fevers, A fib, started AC. Now at [facility] for rehab.     Traumatic T9-L3 Vertebral Fracture s/p Corpectomy and Fusion Surgery 9/29  MSSA Bacteremia 2/2 Paraspinal Abscess   - 10/20 Bcx MSSA, repeat blood cultures 10/21 and 10/23 negative   -  MRI: rim-enhancing heterogeneous fluid collection suspicious for an abscess; culture growing GPC in pairs and rare staph aureus   - TTE: no evidence of vegetations  - No Acute surgical intervention per neurosurgery   - s/p paraspinal collection aspiration 10/25 with Dr. Guerra, fluid culture grew MSSA  - continue Cefazolin 2g q8h via PICC until 12/12, then start Duricef 500mg bid indefinitely   - Weekly CBC, BMP, ESR and CRP  - TLSO when OOB - wean as tolerated per neurosurgery   - Pain control and bowel regimen per rehab team   - RADHA drain dislodeged on 11/9, CT T/L Spine 11/9 showed Long segment spinal fusion as described. Improved paraspinal collections. No retained drain fragment identified.  - Continue comprehensive rehab program with PT/OT  - Outpatient follow up with spine surgeon     Hypertension  Orthostasis   - Continue metoprolol 100mg BID  - Started on low dose losartan 25mg  - Continue to hold chlorthalidone   - Monitor BP, can uptitrate losartan as needed     HLD  -Continue atorvastatin     Paroxysmal A-fib  -Continue Eliquis AC   -Continue metoprolol     Post-Op Anemia  -H/H stable  -Started on ferrous sulfate   -Monitor CBC     Gastritis  Gastroparesis   - Continue vonoprazan and erythromycin susp    RLS  -Continue pramipexole     Seasonal Allergies  -Continue montelukast     Hypokalemia (Resolved)  -s/p repletion   -Monitor BMP    Loose Stool Likely 2/2 Laxatives (Resolved)  -Miralax changed to PRN  -Continue Senna QHS   -Will continue to monitor.    DVT Prophylaxis:  - Eliquis    Discussed with rehab team

## 2024-11-18 NOTE — PROGRESS NOTE ADULT - SUBJECTIVE AND OBJECTIVE BOX
Interval History  Patient seen and examined at bedside. No acute events noted.  Patient reports feeling okay, increased back spasm after walking 165ft with therapy. No other complaints. Having soft BM's twice a day.     ALLERGIES:  Dilantin (Urticaria)  penicillins (Urticaria)  erythromycin (Stomach Upset; Vomiting; Nausea)    MEDICATIONS  (STANDING):  acetaminophen     Tablet .. 975 milliGRAM(s) Oral every 8 hours  apixaban 5 milliGRAM(s) Oral every 12 hours  atorvastatin 40 milliGRAM(s) Oral at bedtime  ceFAZolin   IVPB 2000 milliGRAM(s) IV Intermittent every 8 hours  chlorhexidine 2% Cloths 1 Application(s) Topical daily  cyclobenzaprine 10 milliGRAM(s) Oral three times a day  diclofenac sodium 1% Gel 2 Gram(s) Topical two times a day  DULoxetine 20 milliGRAM(s) Oral daily  ergocalciferol 05706 Unit(s) Oral <User Schedule>  erythromycin    ethylsuccinate Suspension 40 mG/mL 128 milliGRAM(s) Oral every 12 hours  ferrous    sulfate 325 milliGRAM(s) Oral at bedtime  lactobacillus acidophilus 1 Tablet(s) Oral every 12 hours  losartan 25 milliGRAM(s) Oral daily  metoprolol tartrate 100 milliGRAM(s) Oral two times a day  montelukast 10 milliGRAM(s) Oral daily  multivitamin 1 Tablet(s) Oral daily  pramipexole 0.75 milliGRAM(s) Oral <User Schedule>  pregabalin 100 milliGRAM(s) Oral every 8 hours  senna 2 Tablet(s) Oral at bedtime  Vonoprazan (Voquezna) 10mg 1 Tablet(s),Vonoprazan (Voquezna) 10mg 1 Tablet(s) 1 Tablet(s) Oral <User Schedule>    MEDICATIONS  (PRN):  benzocaine/menthol Lozenge 1 Lozenge Oral every 3 hours PRN Mouth Sores  diphenhydrAMINE 25 milliGRAM(s) Oral two times a day PRN Rash and/or Itching  HYDROmorphone   Tablet 4 milliGRAM(s) Oral every 8 hours PRN Severe Pain (7 - 10)  melatonin 6 milliGRAM(s) Oral at bedtime PRN Insomnia  polyethylene glycol 3350 17 Gram(s) Oral two times a day PRN Constipation    Vital Signs Last 24 Hrs  T(F): 98.3 (18 Nov 2024 07:22), Max: 98.3 (17 Nov 2024 19:31)  HR: 92 (18 Nov 2024 07:22) (78 - 94)  BP: 166/81 (18 Nov 2024 07:22) (100/58 - 167/84)  RR: 14 (18 Nov 2024 07:22) (14 - 15)  SpO2: 94% (18 Nov 2024 07:22) (94% - 95%)  I&O's Summary    PHYSICAL EXAM:  GENERAL: NAD  HEENT: NCAT  CHEST/LUNG: Clear to percussion bilaterally; No rales, rhonchi, wheezing  HEART: Regular rate and rhythm; No murmurs  ABDOMEN: Soft, Nontender, Nondistended; Bowel sounds present  MUSCULOSKELETAL/EXTREMITIES:  2+ Peripheral Pulses, No LE edema. Spine incision well healed.   PSYCH: Appropriate affect  NEURO: Alert & Oriented x 3    I personally reviewed the below data/images/labs:    LABS:                        9.6    5.17  )-----------( 355      ( 18 Nov 2024 05:55 )             31.8       11-18    139  |  102  |  21  ----------------------------<  128  4.2   |  26  |  0.78    Ca    9.1      18 Nov 2024 05:55    TPro  7.1  /  Alb  2.9  /  TBili  0.3  /  DBili  x   /  AST  22  /  ALT  24  /  AlkPhos  191  11-18    Urinalysis Basic - ( 18 Nov 2024 05:55 )    Color: x / Appearance: x / SG: x / pH: x  Gluc: 128 mg/dL / Ketone: x  / Bili: x / Urobili: x   Blood: x / Protein: x / Nitrite: x   Leuk Esterase: x / RBC: x / WBC x   Sq Epi: x / Non Sq Epi: x / Bacteria: x    COVID-19 PCR: NotDetec (10-30-24 @ 21:45)  COVID-19 PCR: NotDetec (10-21-24 @ 09:20)    Consultant(s) Notes Reviewed:   Care Discused with Consultants/Other Providers:  Imaging Personally Reviewed:

## 2024-11-18 NOTE — PROGRESS NOTE ADULT - NS ATTEND AMEND GEN_ALL_CORE FT
I have personally seen and examined the patient with the Nurse Practitioner. Medical records reviewed. I have made amendments to the documentation where necessary and adjusted the history, physical examination, and plan as documented by the Nurse Practitioner.
I have personally seen and examined the patient with the Nurse Practitioner. Medical records reviewed. I have made amendments to the documentation where necessary and adjusted the history, physical examination, and plan as documented by the Nurse Practitioner.
I have personally seen and examined the patient. Medical records reviewed. I have made amendments to the documentation where necessary and adjusted the history, physical examination, and plan as documented by the NP.

## 2024-11-18 NOTE — PROGRESS NOTE ADULT - NS ATTEND OPT1A GEN_ALL_CORE
History/Exam/Medical decision making
Exam/Medical decision making
History/Exam/Medical decision making

## 2024-11-18 NOTE — PROGRESS NOTE ADULT - ASSESSMENT
Assessment/Plan:  Mrs. Haley French is a 74-year-old female patient with past medical history of OA, HTN, gastroparesis, peripheral neuropathy, multiple prior thoracolumbar spine surgeries for congenital scoliosis [place]/ [place] of hardware (1991) who is admitted for Acute Inpatient Rehabilitation with a multidisciplinary rehab program at [facility] with functional impairments in ADLs and mobility secondary to mechanical fall reporting a T12 three column spine fracture-malalignment treated surgically with a T9-L3 open fusion with PSO/lag screw partial corpectomy/interbody by Dr. Stroud, Neurosurgeon with complex Plastic Surgery closure by Dr. Elias on 9/29/24 subsequently complicated with MSSA bacteremia secondary to a paraspinal abscess s/p paraspinal collection aspiration on 10/25/24.     #Traumatic T9-L3 vertebral fracture s/p corpectomy and fusion surgery c/b MSSA Bacteremia 2/2 paraspinal abscess  - Paraspinal abscess with Sepsis due to methicillin susceptible Staphylococcus aureus.       * 10/20 Bcx MSSA, 10/21 no growth at 24hrs      * CXR with old rib fracture and small RUL infiltrate      * 10/20 +UA for Ecoli      * MRI: rim-enhancing heterogeneous fluid collection suspicious for an abscess; culture growing GPC in pairs and rare staph aureus       * TTE: no evidence of vegetations      * No Acute surgical intervention per neurosurgery       * S/P paraspinal collection aspiration with paraspinal abscess drain placed with Dr. Guerra 10/25; dislodged accidentally 11/9 with CT showing no retained elements of drain      * Monitor WBC and fever curve      * S/P PICC insertion 10/28      * TLSO when OOB      * Cefazolin 2000mg IV q8 via PICC- until 12/12/24-> Start PO Duricef 500mg BID on 12/13/24 - long term/indefinitely   - Weekly CBC Diff BMP ESR CRP; emailed to [email or web address]  - Activity Limitations: Decreased social, vocational and leisure activities, decreased self care and ADLs, decreased mobility, decreased ability to manage household and finances.   - Comprehensive Multidisciplinary Rehab Program:      * 3 hours a day, 5 days a week.      * PT 2hr/day: Focused on improving strength, endurance, coordination, balance, functional mobility, and transfers      * OT 1hr/day: Focused on improving strength, fine motor skills, coordination, posture and ADLs.      -----------------------------------------------------------------------------------  Concurrent Medical Problems    #Paroxysmal atrial fibrillation  - Metoprolol to 50mg PO BID for rate control  - Eliquis 5mg PO BID    #Acute on chronic low back pain.   - S/P T9-L3 open fusion with PSO/lag screw partial corpectomy/interbody with complex plastic closure (w/ Dr. Stroud and Dr. Tinajero on 9/29  - TLSO when OOB  - Tylenol 975mg PO TID ATC  - Duloxetine 20mg PO daily  - Lyrica 100mg PO TID   - Flexeril 10mg PO TID standing  - diclofenac gel 2g topically BID  - hydromorphone 4mg PO q8h PRN for severe pain    #HTN  #Orthostatic Hypotension  - 10/1 TTE: EF 61%  - S/P 1L IVF bolus (10/13) for symptomatic hypotension with good effect  - metoprolol tartrate 50mg PO BID  - d/reagan losartan  - Chlorthalidone 25 mg daily on hold (restart if SBP is consistently above 140 per cardio)  - Midodrine 7.5 mg on hold    #HLD  - atorvastatin 40mg PO qhs    #Postoperative anemia  - S/p PRBCs intraoperatively for T spine fusion   - Hgb stable with no evidence of active bleed at this time   - 11/11 Hgb 9.5  - Monitor H/H; transfuse for Hgb <7    #SILVANA (obstructive sleep apnea)  - Transient desaturations on RA    - Nocturnal 2L O2 and PRN during day during naps, monitor sats   - F/u with PCP outpatient as may benefit from sleep study and CPAP    #Restless legs syndrome (RLS)  - pramipexole 0.75mg PO daily   - s/w ferrous sulfate 325mg PO daily 11/12    #Seasonal allergies  - montelukast 10mg daily    #Liver cyst  -10/10 CT Abd/pelvis- There is a large cyst, stable since prior exam. There are small hypodense foci on segment II and segment I too small to characterize, unchanged since prior  - f/u outpatient     #Elevated serum alkaline phosphatase level  - high serum alk phos levels 247 --> 228 --> 172 --> 233 --> 239--> 240--> 247--> 234---> 276 -->227 --> 183  - monitor serum alk phos levels    #Gastritis  - Vonoprazan 10mg PO daily  - erythromycin 128mg oral fluid BID    #Mood/Cognition:  - neuropsychology consult PRN    #Sleep:   - maintain quiet hours and low stim environment  - melatonin 6mg HS PRN to maximize participation in therapy during the day    #GI/Bowel:  - senna 1 tablet PO qhs  - d/c Miralax iso soft stools/diarrhea 11/12    #/Bladder:   - PVR q 8 hours (SC if > 400)    #Skin/Pressure Injury:   - Skin assessment on admission: Midline sine dressing with drain in place, with minimal drainage. No pressure injuries    #Diet/Supplement  Current Diet: Regular  - ergocalciferol 44650P PO weekly on Fridays  - multivitamin PO daily     #Precautions / PROPHYLAXIS:   - Falls, Spinal  - ortho: Weight bearing status: WBAT, TLSO brace OOB  - Lungs: Incentive Spirometer   - DVT ppx: SCDs, TEDs, Eliquis 5mg PO BID  - Pressure injury/Skin:  OOB to Chair, PT/OT      ---------------  Code Status: FULL  Emergency Contact:    Outpatient Follow-up:    Fran Carr  Internal Medicine  [address], [address]  [place] [address]  Phone: [phone]  Fax: [phone]  Follow Up Time:     Kaushal Waterman  Infectious Disease  [address] [place] [id number]  Phone: [phone]  Follow Up Time:  [facility] Neurosurgery  Neurosurgery  Referral Assistance Program    Guille Lance  [facility]  ORTHOSURG [address] [facility]  Scheduled Appointment: 11/22/2024    Catalino Cotto  [facility]  WEIGHTMGMT  [facility]  Scheduled Appointment: 12/05/2024    Haydee Bernstein  [facility]  GASTRO [address] [facility]  Scheduled Appointment: 01/06/2025      [facility] - Infectious Disease  Infectious Disease  [address] [facility], [place] [address]  Phone: [phone]    -------------- Assessment/Plan:  Mrs. Haley French is a 74-year-old female patient with past medical history of OA, HTN, gastroparesis, peripheral neuropathy, multiple prior thoracolumbar spine surgeries for congenital scoliosis done >[place] ago [place] w/ removal of hardware (1991) who is admitted for Acute Inpatient Rehabilitation with a multidisciplinary rehab program at [facility] with functional impairments in ADLs and mobility secondary to mechanical fall reporting a T12 three column spine fracture-malalignment treated surgically with a T9-L3 open fusion with PSO/lag screw partial corpectomy/interbody by Dr. Stroud, Neurosurgeon with complex Plastic Surgery closure by Dr. Elias on 9/29/24 subsequently complicated with MSSA bacteremia secondary to a paraspinal abscess s/p paraspinal collection aspiration on 10/25/24.     #Traumatic T9-L3 vertebral fracture s/p corpectomy and fusion surgery c/b MSSA Bacteremia 2/2 paraspinal abscess  - Paraspinal abscess with Sepsis due to methicillin susceptible Staphylococcus aureus.       * 10/20 Bcx MSSA, 10/21 no growth at 24hrs      * CXR with old rib fracture and small RUL infiltrate      * 10/20 +UA for Ecoli      * MRI: rim-enhancing heterogeneous fluid collection suspicious for an abscess; culture growing GPC in pairs and rare staph aureus       * TTE: no evidence of vegetations      * No Acute surgical intervention per neurosurgery       * S/P paraspinal collection aspiration with paraspinal abscess drain placed with Dr. Guerra 10/25; dislodged accidentally 11/9 with CT showing no retained elements of drain      * Monitor WBC and fever curve      * S/P PICC insertion 10/28      Neurosurgeon contacted regarding recommendations to discontinue TLSO. Recommended wean as tolerated.      * Cefazolin 2000mg IV q8 via PICC- until 12/12/24-> Start PO Duricef 500mg BID on 12/13/24 - long term/indefinitely   - Weekly CBC Diff BMP ESR CRP; emailed to [email or web address]  - Activity Limitations: Decreased social, vocational and leisure activities, decreased self care and ADLs, decreased mobility, decreased ability to manage household and finances.   - Comprehensive Multidisciplinary Rehab Program:      * 3 hours a day, 5 days a week.      * PT 2hr/day: Focused on improving strength, endurance, coordination, balance, functional mobility, and transfers      * OT 1hr/day: Focused on improving strength, fine motor skills, coordination, posture and ADLs.      -----------------------------------------------------------------------------------  Concurrent Medical Problems    #Paroxysmal atrial fibrillation  - Metoprolol to 50mg PO BID for rate control  - Eliquis 5mg PO BID    #Acute on chronic low back pain.   - S/P T9-L3 open fusion with PSO/lag screw partial corpectomy/interbody with complex plastic closure (w/ Dr. Stroud and Dr. Tinajero on 9/29  - TLSO when OOB  - Tylenol 975mg PO TID ATC  - Duloxetine 20mg PO daily  - Lyrica 100mg PO TID   - Flexeril 10mg PO TID standing  - diclofenac gel 2g topically BID  - hydromorphone 4mg PO q8h PRN for severe pain    #HTN  #Orthostatic Hypotension  - 10/1 TTE: EF 61%  - S/P 1L IVF bolus (10/13) for symptomatic hypotension with good effect  - metoprolol tartrate 50mg PO BID  - d/reagan losartan  - Chlorthalidone 25 mg daily on hold (restart if SBP is consistently above 140 per cardio)  - Midodrine 7.5 mg on hold    #HLD  - atorvastatin 40mg PO qhs    #Postoperative anemia  - S/p PRBCs intraoperatively for T spine fusion   - Hgb stable with no evidence of active bleed at this time   - 11/11 Hgb 9.5  - Monitor H/H; transfuse for Hgb <7    #SILVANA (obstructive sleep apnea)  - Transient desaturations on RA    - Nocturnal 2L O2 and PRN during day during naps, monitor sats   - F/u with PCP outpatient as may benefit from sleep study and CPAP    #Restless legs syndrome (RLS)  - pramipexole 0.75mg PO daily   - s/w ferrous sulfate 325mg PO daily 11/12    #Seasonal allergies  - montelukast 10mg daily    #Liver cyst  -10/10 CT Abd/pelvis- There is a large cyst, stable since prior exam. There are small hypodense foci on segment II and segment I too small to characterize, unchanged since prior  - f/u outpatient     #Elevated serum alkaline phosphatase level  - high serum alk phos levels 247 --> 228 --> 172 --> 233 --> 239--> 240--> 247--> 234---> 276 -->227 --> 183  - monitor serum alk phos levels    #Gastritis  - Vonoprazan 10mg PO daily  - erythromycin 128mg oral fluid BID    #Mood/Cognition:  - neuropsychology consult PRN    #Sleep:   - maintain quiet hours and low stim environment  - melatonin 6mg HS PRN to maximize participation in therapy during the day    #GI/Bowel:  - senna 1 tablet PO qhs  - d/c Miralax iso soft stools/diarrhea 11/12    #/Bladder:   - PVR q 8 hours (SC if > 400)    #Skin/Pressure Injury:   - Skin assessment on admission: Midline sine dressing with drain in place, with minimal drainage. No pressure injuries    #Diet/Supplement  Current Diet: Regular  - ergocalciferol 36411Q PO weekly on Fridays  - multivitamin PO daily     #Precautions / PROPHYLAXIS:   - Falls, Spinal  - ortho: Weight bearing status: WBAT, TLSO brace OOB  - Lungs: Incentive Spirometer   - DVT ppx: SCDs, TEDs, Eliquis 5mg PO BID  - Pressure injury/Skin:  OOB to Chair, PT/OT      ---------------  Code Status: FULL  Emergency Contact:    Outpatient Follow-up:    Fran Carr  Internal Medicine  [address] [facility] [facility], [address]  [place] [address]  Phone: [phone]  Fax: [phone]  Follow Up Time:     Kaushal Waterman  Infectious Disease  [facility] [place] [address]  Phone: [phone]  Follow Up Time:  [facility] Neurosurgery  Neurosurgery  Referral Assistance Program    Guille Lance  [facility]  ORTHOSURG [address] [facility]  Scheduled Appointment: 11/22/2024    Catalino Cotto  [facility]  WEIGHTMGMT  [facility]  Scheduled Appointment: 12/05/2024    Bernstein, Haydee [facility]  GASTRO [address]  Scheduled Appointment: 01/06/2025      [facility] - Infectious Disease  Infectious Disease  [address] [facility], [place] [address]  Phone: [phone]    --------------

## 2024-11-18 NOTE — PROGRESS NOTE ADULT - SUBJECTIVE AND OBJECTIVE BOX
HPI:  Mrs. Haley French is a 74-year-old female patient with past medical history of OA, HTN, gastroparesis, peripheral neuropathy, multiple prior thoracolumbar spine surgeries for congenital scoliosis [place] >[place] of [place] w/ removal of hardware (1991) who presented to [facility] on 9/28/24 with back pain and left hip pain s/p mechanical fall. CT imaging was performed and reported a T12 three column spine fracture-malalignment with fracture extending to the adjacent right 12th posterior rib and left T12-L1 facet joint and acute, displaced fracture of the left eighth lateral rib. Chronic appearing bilateral rib deformities. Surgical management was recommended and she is S/P T9-L3 open fusion with PSO/lag screw partial corpectomy/interbody by Dr. Stroud, Neurosurgeon with complex Plastic Surgery closure by Dr. Elias on 9/29/24. Post op course c/b CSK leak s/p repair, new onset paroxsymal Afib with RVR,  increased pain and drainage from surgical incision, development of pleural effusions, symptomatic orthostatic hypotension, urinary retention, post op anemia, and chest pain with spontaneous resolution. Cardiac work up negative. Patient was evaluated by PM&R and therapy for functional deficits, gait/ADL impairments and acute rehabilitation was recommended. Patient was cleared for discharge to [facility] IRF on 10/16/24.    Her admission was remarkable for a developing a fever of 103 with tachycardia and subjective fever/chills. Sepsis w/up was initiated and found to have small right upper lobe infiltrate, UTI, and MSSA bacteremia. CTH negative. Hospitalist and ID were consulted and per G+ bacteremia guidelines was recommended completing infectious workup, which includes MICHAEL which is not available at [facility]. Patient also required PICC line placement for long term ABx and a transfer request initiated from [place] to [facility] for MICHAEL and completion of bacteremia workup/treatment. All other medical co-morbidities were stable. Patient was deemed medically stable for discharge to [facility] medicine on 10/21/24 and was subsequently transferred on 10/23/24 for MICHAEL, advanced spine imaging, and further management. She was found to have a paraspinal abscess on MR imaging and is s/p paraspinal collection aspiration on 10/25/24. Culture grew gram positive cocci in pairs, with rare staph aureus. Neurosurgery advised and no acute surgical revision was deemed necessary. Repeat BCx was negative. Recommendation to continue long-term IV cefazolin via PICC until 12/12/24 (6 wks) with eventual transition to PO Duricef long term/indefinitely per ID. MICHAEL was negative for valvular vegetations but did reveal incidental PFO. Hospital course was significant for recurrent fevers, A fib (resolved) with source control and electrolyte correction, initiated on DOAc given elevated [id number]. Patient was evaluated by PM&R and therapy for functional deficits, gait/ADL impairments and acute rehabilitation was recommended. Patient was cleared for discharge to [facility] IRF on 10/30/24.   (30 Oct 2024 13:05)    TDD: 11/20/24 to Home  ___________________________________________________________________________    SUBJECTIVE/ROS:   Patient seen and examined in room and seen in gym with TLSO on.   No acute overnight events and reported some improvement in her sleep.  Neurosurgeon contacted regarding recommendations to discontinue TLSO.  Recommended wean as tolerated.  Case was discussed at Interdisciplinary Team meeting today.  No changes to the tentative discharge date outlined above.  Patient is eager to continue participation on the recommended rehabilitation program.   No other concerns. Case to be reevaluated at Interdisciplinary Team meeting tomorrow.    ___________________________________________________________________________    CT Lumbar Spine w/wo IV Cont (11.09.24 @ 13:55)  IMPRESSION: Long segment spinal fusion as described. Improved paraspinal collections. No retained drain fragment identified.    ___________________________________________________________________________    Vital Signs Last 24 Hrs  T(C): 36.8 (18 Nov 2024 07:22), Max: 36.8 (17 Nov 2024 19:31)  T(F): 98.3 (18 Nov 2024 07:22), Max: 98.3 (17 Nov 2024 19:31)  HR: 92 (18 Nov 2024 07:22) (78 - 94)  BP: 166/81 (18 Nov 2024 07:22) (100/58 - 167/84)  RR: 14 (18 Nov 2024 07:22) (14 - 15)  SpO2: 94% (18 Nov 2024 07:22) (94% - 95%)    ___________________________________________________________________________    LAB                        9.6    5.17  )-----------( 355      ( 18 Nov 2024 05:55 )             31.8                           9.6    4.66  )-----------( 406      ( 14 Nov 2024 06:01 )             31.9         11-18    139  |  102  |  21  ----------------------------<  128[H]  4.2   |  26  |  0.78    Ca    9.1      18 Nov 2024 05:55    TPro  7.1  /  Alb  2.9[L]  /  TBili  0.3  /  DBili  x   /  AST  22  /  ALT  24  /  AlkPhos  191[H]  11-18    LIVER FUNCTIONS - ( 18 Nov 2024 05:55 )  Alb: 2.9 g/dL / Pro: 7.1 g/dL / ALK PHOS: 191 U/L / ALT: 24 U/L / AST: 22 U/L / GGT: x             11-14    140  |  104  |  24[H]  ----------------------------<  108[H]  3.9   |  25  |  0.82    Ca    9.2      14 Nov 2024 06:01    TPro  7.3  /  Alb  3.0[L]  /  TBili  0.3  /  DBili  x   /  AST  18  /  ALT  27  /  AlkPhos  192[H]  11-14    LIVER FUNCTIONS - ( 14 Nov 2024 06:01 )  Alb: 3.0 g/dL / Pro: 7.3 g/dL / ALK PHOS: 192 U/L / ALT: 27 U/L / AST: 18 U/L / GGT: x           ___________________________________________________________________________    MEDICATIONS  (STANDING):  acetaminophen     Tablet .. 975 milliGRAM(s) Oral every 8 hours  apixaban 5 milliGRAM(s) Oral every 12 hours  atorvastatin 40 milliGRAM(s) Oral at bedtime  ceFAZolin   IVPB 2000 milliGRAM(s) IV Intermittent every 8 hours  chlorhexidine 2% Cloths 1 Application(s) Topical daily  cyclobenzaprine 10 milliGRAM(s) Oral three times a day  diclofenac sodium 1% Gel 2 Gram(s) Topical two times a day  DULoxetine 20 milliGRAM(s) Oral daily  ergocalciferol 52825 Unit(s) Oral <User Schedule>  erythromycin    ethylsuccinate Suspension 40 mG/mL 128 milliGRAM(s) Oral every 12 hours  ferrous    sulfate 325 milliGRAM(s) Oral at bedtime  lactobacillus acidophilus 1 Tablet(s) Oral every 12 hours  losartan 25 milliGRAM(s) Oral daily  metoprolol tartrate 100 milliGRAM(s) Oral two times a day  montelukast 10 milliGRAM(s) Oral daily  multivitamin 1 Tablet(s) Oral daily  pramipexole 0.75 milliGRAM(s) Oral <User Schedule>  pregabalin 100 milliGRAM(s) Oral every 8 hours  senna 2 Tablet(s) Oral at bedtime  Vonoprazan (Voquezna) 10mg 1 Tablet(s),Vonoprazan (Voquezna) 10mg 1 Tablet(s) 1 Tablet(s) Oral <User Schedule>    MEDICATIONS  (PRN):  benzocaine/menthol Lozenge 1 Lozenge Oral every 3 hours PRN Mouth Sores  diphenhydrAMINE 25 milliGRAM(s) Oral two times a day PRN Rash and/or Itching  HYDROmorphone   Tablet 4 milliGRAM(s) Oral every 8 hours PRN Severe Pain (7 - 10)  melatonin 6 milliGRAM(s) Oral at bedtime PRN Insomnia  polyethylene glycol 3350 17 Gram(s) Oral two times a day PRN Constipation    ___________________________________________________________________________    PHYSICAL EXAM:    Constitutional - NAD, Comfortable  Chest - good chest expansion, good respiratory effort  Cardio - warm and well perfused  Extremities - No peripheral edema, No calf tenderness   Neuro-     Cognitive - AAOx3, follows command     Motor -                     LEFT    UE - ShAB 4/5, EF 5/5, EE 5/5, WE 5/5,  5/5                    RIGHT UE - ShAB 4/5, EF 5/5, EE 5/5, WE 5/5,  5/5                    LEFT    LE - HF 4/5, KE 5/5, DF 5/5, PF 5/5                    RIGHT LE - HF 4/5, KE 5/5, DF 5/5, PF 5/5        Sensory - Intact to LT in UEs and LEs B/L      Reflexes - DTRs Intact     Coordination - FTN intact     Sensory - Intact to LT bilateral LE, but diminished  Psychiatric - Mood stable, Affect WNL      ___________________________________________________________________________ HPI:  Mrs. Haley French is a 74-year-old female patient with past medical history of OA, HTN, gastroparesis, peripheral neuropathy, multiple prior thoracolumbar spine surgeries for congenital scoliosis done >[place] ago out of [place] w/ removal of hardware (1991) who presented to [facility] on 9/28/24 with back pain and left hip pain s/p mechanical fall. CT imaging was performed and reported a T12 three column spine fracture-malalignment with fracture extending to the adjacent right 12th posterior rib and left T12-L1 facet joint and acute, displaced fracture of the left eighth lateral rib. Chronic appearing bilateral rib deformities. Surgical management was recommended and she is S/P T9-L3 open fusion with PSO/lag screw partial corpectomy/interbody by Dr. Stroud, Neurosurgeon with complex Plastic Surgery closure by Dr. Elias on 9/29/24. Post op course c/b CSK leak s/p repair, new onset paroxsymal Afib with RVR,  increased pain and drainage from surgical incision, development of pleural effusions, symptomatic orthostatic hypotension, urinary retention, post op anemia, and chest pain with spontaneous resolution. Cardiac work up negative. Patient was evaluated by PM&R and therapy for functional deficits, gait/ADL impairments and acute rehabilitation was recommended. Patient was cleared for discharge to [facility] IRF on 10/16/24.    Her admission was remarkable for a developing a fever of 103 with tachycardia and subjective fever/chills. Sepsis w/up was initiated and found to have small right upper lobe infiltrate, UTI, and MSSA bacteremia. CTH negative. Hospitalist and ID were consulted and per G+ bacteremia guidelines was recommended completing infectious workup, which includes MICHAEL which is not available at [facility]. Patient also required PICC line placement for long term ABx and a transfer request initiated from [place] to [facility] for MICHAEL and completion of bacteremia workup/treatment. All other medical co-morbidities were stable. Patient was deemed medically stable for discharge to [facility] medicine on 10/21/24 and was subsequently transferred on 10/23/24 for MICHAEL, advanced spine imaging, and further management. She was found to have a paraspinal abscess on MR imaging and is s/p paraspinal collection aspiration on 10/25/24. Culture grew gram positive cocci in pairs, with rare staph aureus. Neurosurgery advised and no acute surgical revision was deemed necessary. Repeat BCx was negative. Recommendation to continue long-term IV cefazolin via PICC until 12/12/24 (6 wks) with eventual transition to PO Duricef long term/indefinitely per ID. MICHAEL was negative for valvular vegetations but did reveal incidental PFO. Hospital course was significant for recurrent fevers, A fib (resolved) with source control and electrolyte correction, initiated on DOAc given elevated [id number]. Patient was evaluated by PM&R and therapy for functional deficits, gait/ADL impairments and acute rehabilitation was recommended. Patient was cleared for discharge to [facility] IRF on 10/30/24.   (30 Oct 2024 13:05)    TDD: 11/20/24 to Home  ___________________________________________________________________________    SUBJECTIVE/ROS:   Patient seen and examined in room. Tolerating sitting up in wheelchair without TLSO  No acute overnight events and reported some improvement in her sleep.  Recommended wean as tolerated.  Pt anticipating discharge home. Will discuss medication for discharge.   Patient is eager to continue participation on the recommended rehabilitation program.     ___________________________________________________________________________    CT Lumbar Spine w/wo IV Cont (11.09.24 @ 13:55)  IMPRESSION: Long segment spinal fusion as described. Improved paraspinal collections. No retained drain fragment identified.    ___________________________________________________________________________    Vital Signs Last 24 Hrs  T(C): 36.8 (18 Nov 2024 07:22), Max: 36.8 (17 Nov 2024 19:31)  T(F): 98.3 (18 Nov 2024 07:22), Max: 98.3 (17 Nov 2024 19:31)  HR: 92 (18 Nov 2024 07:22) (78 - 94)  BP: 166/81 (18 Nov 2024 07:22) (100/58 - 167/84)  RR: 14 (18 Nov 2024 07:22) (14 - 15)  SpO2: 94% (18 Nov 2024 07:22) (94% - 95%)    ___________________________________________________________________________    LAB                        9.6    5.17  )-----------( 355      ( 18 Nov 2024 05:55 )             31.8                           9.6    4.66  )-----------( 406      ( 14 Nov 2024 06:01 )             31.9         11-18    139  |  102  |  21  ----------------------------<  128[H]  4.2   |  26  |  0.78    Ca    9.1      18 Nov 2024 05:55    TPro  7.1  /  Alb  2.9[L]  /  TBili  0.3  /  DBili  x   /  AST  22  /  ALT  24  /  AlkPhos  191[H]  11-18    LIVER FUNCTIONS - ( 18 Nov 2024 05:55 )  Alb: 2.9 g/dL / Pro: 7.1 g/dL / ALK PHOS: 191 U/L / ALT: 24 U/L / AST: 22 U/L / GGT: x             11-14    140  |  104  |  24[H]  ----------------------------<  108[H]  3.9   |  25  |  0.82    Ca    9.2      14 Nov 2024 06:01    TPro  7.3  /  Alb  3.0[L]  /  TBili  0.3  /  DBili  x   /  AST  18  /  ALT  27  /  AlkPhos  192[H]  11-14    LIVER FUNCTIONS - ( 14 Nov 2024 06:01 )  Alb: 3.0 g/dL / Pro: 7.3 g/dL / ALK PHOS: 192 U/L / ALT: 27 U/L / AST: 18 U/L / GGT: x           ___________________________________________________________________________    MEDICATIONS  (STANDING):  acetaminophen     Tablet .. 975 milliGRAM(s) Oral every 8 hours  apixaban 5 milliGRAM(s) Oral every 12 hours  atorvastatin 40 milliGRAM(s) Oral at bedtime  ceFAZolin   IVPB 2000 milliGRAM(s) IV Intermittent every 8 hours  chlorhexidine 2% Cloths 1 Application(s) Topical daily  cyclobenzaprine 10 milliGRAM(s) Oral three times a day  diclofenac sodium 1% Gel 2 Gram(s) Topical two times a day  DULoxetine 20 milliGRAM(s) Oral daily  ergocalciferol 77432 Unit(s) Oral <User Schedule>  erythromycin    ethylsuccinate Suspension 40 mG/mL 128 milliGRAM(s) Oral every 12 hours  ferrous    sulfate 325 milliGRAM(s) Oral at bedtime  lactobacillus acidophilus 1 Tablet(s) Oral every 12 hours  losartan 25 milliGRAM(s) Oral daily  metoprolol tartrate 100 milliGRAM(s) Oral two times a day  montelukast 10 milliGRAM(s) Oral daily  multivitamin 1 Tablet(s) Oral daily  pramipexole 0.75 milliGRAM(s) Oral <User Schedule>  pregabalin 100 milliGRAM(s) Oral every 8 hours  senna 2 Tablet(s) Oral at bedtime  Vonoprazan (Voquezna) 10mg 1 Tablet(s),Vonoprazan (Voquezna) 10mg 1 Tablet(s) 1 Tablet(s) Oral <User Schedule>    MEDICATIONS  (PRN):  benzocaine/menthol Lozenge 1 Lozenge Oral every 3 hours PRN Mouth Sores  diphenhydrAMINE 25 milliGRAM(s) Oral two times a day PRN Rash and/or Itching  HYDROmorphone   Tablet 4 milliGRAM(s) Oral every 8 hours PRN Severe Pain (7 - 10)  melatonin 6 milliGRAM(s) Oral at bedtime PRN Insomnia  polyethylene glycol 3350 17 Gram(s) Oral two times a day PRN Constipation    ___________________________________________________________________________    PHYSICAL EXAM:    Constitutional - NAD, Comfortable  Chest - good chest expansion, good respiratory effort  Cardio - warm and well perfused  Extremities - No peripheral edema, No calf tenderness   Neuro-     Cognitive - AAOx3, follows command     Motor -                     LEFT    UE - ShAB 4/5, EF 5/5, EE 5/5, WE 5/5,  5/5                    RIGHT UE - ShAB 4/5, EF 5/5, EE 5/5, WE 5/5,  5/5                    LEFT    LE - HF 4/5, KE 5/5, DF 5/5, PF 5/5                    RIGHT LE - HF 4/5, KE 5/5, DF 5/5, PF 5/5        Sensory - Intact to LT in UEs and LEs B/L      Reflexes - DTRs Intact     Coordination - FTN intact     Sensory - Intact to LT bilateral LE, but diminished  Psychiatric - Mood stable, Affect WNL      ___________________________________________________________________________

## 2024-11-19 ENCOUNTER — TRANSCRIPTION ENCOUNTER (OUTPATIENT)
Age: 74
End: 2024-11-19

## 2024-11-19 PROCEDURE — 99232 SBSQ HOSP IP/OBS MODERATE 35: CPT

## 2024-11-19 RX ORDER — LIDOCAINE 40 MG/G
1 CREAM TOPICAL DAILY
Refills: 0 | Status: DISCONTINUED | OUTPATIENT
Start: 2024-11-19 | End: 2024-11-20

## 2024-11-19 RX ORDER — LIDOCAINE 40 MG/G
1 CREAM TOPICAL
Qty: 6 | Refills: 0
Start: 2024-11-19 | End: 2024-12-18

## 2024-11-19 RX ADMIN — Medication 100 MILLIGRAM(S): at 21:17

## 2024-11-19 RX ADMIN — ACETAMINOPHEN 500MG 975 MILLIGRAM(S): 500 TABLET, COATED ORAL at 16:00

## 2024-11-19 RX ADMIN — Medication 40 MILLIGRAM(S): at 21:16

## 2024-11-19 RX ADMIN — HYDROMORPHONE HYDROCHLORIDE 4 MILLIGRAM(S): 2 TABLET ORAL at 11:11

## 2024-11-19 RX ADMIN — LOSARTAN POTASSIUM 25 MILLIGRAM(S): 100 TABLET, FILM COATED ORAL at 06:20

## 2024-11-19 RX ADMIN — Medication 1 TABLET(S): at 17:51

## 2024-11-19 RX ADMIN — APIXABAN 5 MILLIGRAM(S): 2.5 TABLET, FILM COATED ORAL at 17:51

## 2024-11-19 RX ADMIN — ACETAMINOPHEN 500MG 975 MILLIGRAM(S): 500 TABLET, COATED ORAL at 06:19

## 2024-11-19 RX ADMIN — HYDROMORPHONE HYDROCHLORIDE 4 MILLIGRAM(S): 2 TABLET ORAL at 21:20

## 2024-11-19 RX ADMIN — MONTELUKAST SODIUM 10 MILLIGRAM(S): 10 TABLET ORAL at 11:50

## 2024-11-19 RX ADMIN — HYDROMORPHONE HYDROCHLORIDE 4 MILLIGRAM(S): 2 TABLET ORAL at 20:24

## 2024-11-19 RX ADMIN — HYDROMORPHONE HYDROCHLORIDE 4 MILLIGRAM(S): 2 TABLET ORAL at 10:14

## 2024-11-19 RX ADMIN — PREGABALIN 100 MILLIGRAM(S): 75 CAPSULE ORAL at 15:17

## 2024-11-19 RX ADMIN — PREGABALIN 100 MILLIGRAM(S): 75 CAPSULE ORAL at 21:16

## 2024-11-19 RX ADMIN — Medication 25 MILLIGRAM(S): at 20:24

## 2024-11-19 RX ADMIN — Medication 10 MILLIGRAM(S): at 06:20

## 2024-11-19 RX ADMIN — APIXABAN 5 MILLIGRAM(S): 2.5 TABLET, FILM COATED ORAL at 06:20

## 2024-11-19 RX ADMIN — PREGABALIN 100 MILLIGRAM(S): 75 CAPSULE ORAL at 06:20

## 2024-11-19 RX ADMIN — Medication 1 TABLET(S): at 11:50

## 2024-11-19 RX ADMIN — CHLORHEXIDINE GLUCONATE 1 APPLICATION(S): 1.2 RINSE ORAL at 06:28

## 2024-11-19 RX ADMIN — ACETAMINOPHEN 500MG 975 MILLIGRAM(S): 500 TABLET, COATED ORAL at 15:13

## 2024-11-19 RX ADMIN — Medication 100 MILLIGRAM(S): at 05:21

## 2024-11-19 RX ADMIN — LIDOCAINE 1 PATCH: 40 CREAM TOPICAL at 14:03

## 2024-11-19 RX ADMIN — Medication 10 MILLIGRAM(S): at 12:56

## 2024-11-19 RX ADMIN — DICLOFENAC SODIUM 2 GRAM(S): 20 SOLUTION TOPICAL at 18:16

## 2024-11-19 RX ADMIN — Medication 20 MILLIGRAM(S): at 11:50

## 2024-11-19 RX ADMIN — Medication 325 MILLIGRAM(S): at 21:16

## 2024-11-19 RX ADMIN — Medication 100 MILLIGRAM(S): at 15:24

## 2024-11-19 RX ADMIN — Medication 1 TABLET(S): at 06:20

## 2024-11-19 RX ADMIN — METOPROLOL TARTRATE 100 MILLIGRAM(S): 100 TABLET, FILM COATED ORAL at 06:19

## 2024-11-19 RX ADMIN — METOPROLOL TARTRATE 100 MILLIGRAM(S): 100 TABLET, FILM COATED ORAL at 17:51

## 2024-11-19 RX ADMIN — Medication 2 TABLET(S): at 21:16

## 2024-11-19 RX ADMIN — ACETAMINOPHEN 500MG 975 MILLIGRAM(S): 500 TABLET, COATED ORAL at 21:15

## 2024-11-19 RX ADMIN — DICLOFENAC SODIUM 2 GRAM(S): 20 SOLUTION TOPICAL at 08:16

## 2024-11-19 RX ADMIN — Medication 10 MILLIGRAM(S): at 21:13

## 2024-11-19 RX ADMIN — PRAMIPEXOLE 0.75 MILLIGRAM(S): 1.5 TABLET, EXTENDED RELEASE ORAL at 15:22

## 2024-11-19 NOTE — DISCHARGE NOTE NURSING/CASE MANAGEMENT/SOCIAL WORK - FINANCIAL ASSISTANCE
[facility] provides services at a reduced cost to those who are determined to be eligible through [facility]’s financial assistance program. Information regarding [facility]’s financial assistance program can be found by going to [email or web address] or by calling [phone].

## 2024-11-19 NOTE — PROGRESS NOTE ADULT - TIME BILLING
Time spent includes direct patient care (interview and examination of patient), discussion with other providers, support staff and/or patient's family members, review of medical records, ordering diagnostic tests and analyzing results, and documentation
Time spent includes direct patient care  (interview and examination of patient), discussion with other providers, support staff and/or patient's family members, review of medical records, ordering diagnostic tests and analyzing results, and documentation.
Time spent includes direct patient care (interview and examination of patient), discussion with other providers, support staff and/or patient's family members, review of medical records, ordering diagnostic tests and analyzing results, and documentation

## 2024-11-19 NOTE — PROGRESS NOTE ADULT - ASSESSMENT
Pt has to be seen for further refills.  Please see refill message from 3/09/22 74 year old female with PMHx of OA, HTN, gastroparesis, peripheral neuropathy, multiple prior thoracolumbar spine surgeries for congenital scoliosis [place] >[place] w/ removal of hardware (1991) and recent fall with T11-T12 fracture, s/p thoracolumbar posterior fusion for t spine fx with plastics closure on  9/29 with Dr. Stroud and Dr. Funk, c/b post op csf leak, new onset paroxysmal Afib with RVR, increased post op pain and drainage from incision site, pleural effusions, symptomatic orthostatic hypotension, urinary retention, post op anemia, and CP with spontaneous resolution. Patient was sent to [facility] rehab on 10/17, c/b AMS, sepsis with high fever, rigors, tachycardia on 10/20 found to have MSSA bacteremia with right upper lobe infiltrate, and UTI. CT spine no report of collection. Urine +ve E coli, transferred back to [facility] on 10/23 for MICHAEL, advanced spine imaging, and further management, found to have paraspinal abscess on MR imaging, S/p paraspinal collection aspiration on 10/25, culture growing gram positive cocci in pairs, with rare staph aureus. Neurosurgery advised no acute surgical revision necessary, repeat BCx negative. To continue long-term IV cefazolin via PICC until 12/12/24 (6 wks) with eventual transition to PO Duricef long term/indefinitely as per ID. MICHAEL was negative for valvular vegetations but did reveal incidental PFO. Hospital course significant for recurrent fevers, A fib, started AC. Now at [facility] for rehab.     Traumatic T9-L3 Vertebral Fracture s/p Corpectomy and Fusion Surgery 9/29  MSSA Bacteremia 2/2 Paraspinal Abscess   - 10/20 Bcx MSSA, repeat blood cultures 10/21 and 10/23 negative   -  MRI: rim-enhancing heterogeneous fluid collection suspicious for an abscess; culture growing GPC in pairs and rare staph aureus   - TTE: no evidence of vegetations  - No Acute surgical intervention per neurosurgery   - s/p paraspinal collection aspiration 10/25 with Dr. Guerra, fluid culture grew MSSA  - continue Cefazolin 2g q8h via PICC until 12/12, then start Duricef 500mg bid indefinitely   - Weekly CBC, BMP, ESR and CRP  - TLSO when OOB - wean as tolerated per neurosurgery   - Pain control and bowel regimen per rehab team   - RADHA drain dislodeged on 11/9, CT T/L Spine 11/9 showed Long segment spinal fusion as described. Improved paraspinal collections. No retained drain fragment identified.  - Continue comprehensive rehab program with PT/OT  - Outpatient follow up with spine surgeon and ID     Hypertension  Orthostasis   - Continue metoprolol 100mg BID  - Continue losartan 25mg QD   - Continue to hold chlorthalidone   - Monitor BP, can uptitrate losartan as needed     HLD  -Continue atorvastatin     Paroxysmal A-fib  -Continue Eliquis   -Continue metoprolol     Post-Op Anemia  -H/H stable  -Started on ferrous sulfate   -Monitor CBC     Gastritis  Gastroparesis   - Continue vonoprazan and erythromycin susp    RLS  -Continue pramipexole     Seasonal Allergies  -Continue montelukast     Hypokalemia (Resolved)  -s/p repletion   -Monitor BMP    Loose Stool Likely 2/2 Laxatives (Resolved)  -Miralax changed to PRN  -Continue Senna QHS   -Will continue to monitor.    DVT Prophylaxis:  - Eliquis    Discussed with rehab team

## 2024-11-19 NOTE — DISCHARGE NOTE NURSING/CASE MANAGEMENT/SOCIAL WORK - PATIENT PORTAL LINK FT
You can access the FollowMyHealth Patient Portal offered by [facility] by registering at the following website: [email or web address]. By joining [employer]’s FollowMyHealth portal, you will also be able to view your health information using other applications (apps) compatible with our system.

## 2024-11-19 NOTE — PROGRESS NOTE ADULT - ASSESSMENT
Assessment/Plan:  Mrs. Haley French is a 74-year-old woman with history of OA, HTN, gastroparesis, peripheral neuropathy, multiple prior thoracolumbar spine surgeries for congenital scoliosis [place]/ [place] of [place] (1991) who is admitted for Acute Inpatient Rehabilitation with a multidisciplinary rehab program at [facility] with functional impairments in ADLs and mobility secondary to mechanical fall reporting a T12 three column spine fracture-malalignment treated surgically with a T9-L3 open fusion with PSO/lag screw partial corpectomy/interbody by Dr. Stroud, Neurosurgeon with complex Plastic Surgery closure by Dr. Elias on 9/29/24 subsequently complicated with MSSA bacteremia secondary to a paraspinal abscess s/p paraspinal collection aspiration on 10/25/24.     #Traumatic T9-L3 vertebral fracture s/p corpectomy and fusion surgery c/b MSSA Bacteremia 2/2 paraspinal abscess  - Paraspinal abscess with Sepsis due to methicillin susceptible Staphylococcus aureus.       * 10/20 Bcx MSSA, 10/21 negative      * CXR with old rib fracture and small RUL infiltrate      * 10/20 +UA for Ecoli      * MRI: rim-enhancing heterogeneous fluid collection suspicious for an abscess; culture growing GPC in pairs and rare staph aureus       * TTE: no evidence of vegetations      * No Acute surgical intervention per neurosurgery       * S/P paraspinal collection aspiration with paraspinal abscess drain placed with Dr. Guerra 10/25; dislodged accidentally 11/9 with CT showing no retained elements of drain      * Monitor WBC and fever curve      * S/P PICC insertion 10/28      Neurosurgeon contacted regarding recommendations to discontinue TLSO. Recommended wean as tolerated      * Cefazolin 2000mg IV q8 via PICC- until 12/12/24-> Start PO Duricef 500mg BID on 12/13/24 - long term/indefinitely   - Weekly CBC Diff BMP ESR CRP; emailed to [email or web address]  - Activity Limitations: Decreased social, vocational and leisure activities, decreased self care and ADLs, decreased mobility, decreased ability to manage household and finances  - Comprehensive Multidisciplinary Rehab Program:      * 3 hours a day, 5 days a week      * PT 2hr/day: Focused on improving strength, endurance, coordination, balance, functional mobility, and transfers      * OT 1hr/day: Focused on improving strength, fine motor skills, coordination, posture and ADLs.      -----------------------------------------------------------------------------------  Concurrent Medical Problems    #Paroxysmal atrial fibrillation  - Metoprolol 100mg PO BID for rate control  - Eliquis 5mg PO BID    #Acute on chronic low back pain.   - S/P T9-L3 open fusion with PSO/lag screw partial corpectomy/interbody with complex plastic closure (w/ Dr. Stroud and Dr. Tinajero on 9/29  - TLSO when OOB - to wean as tolerated  - Tylenol 975mg PO TID ATC  - Duloxetine 20mg PO daily  - Lyrica 100mg PO TID   - Flexeril 10mg PO TID standing  - diclofenac gel 2g topically BID  - hydromorphone 4mg PO q8h PRN for severe pain    #HTN  #Orthostatic Hypotension  - 10/1 TTE: EF 61%  - S/P 1L IVF bolus (10/13) for symptomatic hypotension with good effect  - metoprolol tartrate 100mg PO BID  - losartan 25mg PO daily  - Chlorthalidone 25 mg daily on hold (restart if SBP is consistently above 140 per cardio)  - Midodrine 7.5 mg on hold    #HLD  - atorvastatin 40mg PO qhs    #Postoperative anemia  - s/p pRBCs intraoperatively for T spine fusion   - Hgb stable with no evidence of active bleed at this time   - 11/18 Hgb 9.6  - Monitor H/H; transfuse for Hgb <7    #SILVANA (obstructive sleep apnea)  - Transient desaturations on RA    - Nocturnal 2L O2 and PRN during day during naps, monitor sats   - F/u with PCP outpatient as may benefit from sleep study and CPAP    #Restless legs syndrome (RLS)  - pramipexole 0.75mg PO daily   - ferrous sulfate 325mg PO daily started 11/12    #Seasonal allergies  - montelukast 10mg daily    #Liver cyst  -10/10 CT Abd/pelvis- There is a large cyst, stable since prior exam. There are small hypodense foci on segment II and segment I too small to characterize, unchanged since prior  - f/u outpatient     #Elevated serum alkaline phosphatase level  - high serum alk phos levels 247 --> 228 --> 172 --> 233 --> 239--> 240--> 247--> 234---> 276 -->227 --> 183 --> 191  - monitor serum alk phos levels    #Gastritis  - Vonoprazan 10mg PO daily  - erythromycin 128mg oral fluid BID    #Mood/Cognition:  - neuropsychology consult PRN    #Sleep:   - maintain quiet hours and low stim environment  - melatonin 6mg HS PRN to maximize participation in therapy during the day    #GI/Bowel:  - senna 2 tablets PO qhs  - Miralax 17g PO daily PRN for constipation  - lactobacillus acidophilus 1 tablet PO q12h (probiotic)    #/Bladder:   - PVR q8h (SC if > 400)    #Skin/Pressure Injury:   - Skin assessment on admission: Midline sine dressing with drain in place, with minimal drainage. No pressure injuries    #Diet/Supplement  Current Diet: Regular  - ergocalciferol 86903Q PO weekly on Fridays  - multivitamin PO daily     #Precautions / PROPHYLAXIS:   - Falls, Spinal  - ortho: Weight bearing status: WBAT, TLSO brace OOB  - Lungs: Incentive Spirometer   - DVT ppx: SCDs, TEDs, Eliquis 5mg PO BID  - Pressure injury/Skin:  OOB to Chair, PT/OT      ---------------  Code Status: FULL  Emergency Contact:    Outpatient Follow-up:    Fran Carr  Internal Medicine  [address] [facility], [address]  [place] [address]  Phone: [phone]  Fax: [phone]  Follow Up Time:     Kaushal Waterman  Infectious Disease  [facility] [place] [address]  Phone: [phone]  Follow Up Time:  [facility] Neurosurgery  Neurosurgery  Referral Assistance Program    [facility]  ORTHOSURG [address] [facility]  Scheduled Appointment: 11/22/2024    Catalino Cotto  [facility]  WEIGHTMGMT  [facility]  Scheduled Appointment: 12/05/2024    [facility]  GASTRO [address] [place] [address]  Scheduled Appointment: 01/06/2025      [facility] - Infectious Disease  Infectious Disease  [address] [facility], [place] [address]  Phone: [phone]    -------------- Assessment/Plan:  Mrs. Haley French is a 74-year-old woman with history of OA, HTN, gastroparesis, peripheral neuropathy, multiple prior thoracolumbar spine surgeries for congenital scoliosis done >[place] ago [place] of [place] w/ removal of hardware (1991) who is admitted for Acute Inpatient Rehabilitation with a multidisciplinary rehab program at [facility] with functional impairments in ADLs and mobility secondary to mechanical fall reporting a T12 three column spine fracture-malalignment treated surgically with a T9-L3 open fusion with PSO/lag screw partial corpectomy/interbody by Dr. Stroud, Neurosurgeon with complex Plastic Surgery closure by Dr. Elias on 9/29/24 subsequently complicated with MSSA bacteremia secondary to a paraspinal abscess s/p paraspinal collection aspiration on 10/25/24.     #Traumatic T9-L3 vertebral fracture s/p corpectomy and fusion surgery c/b MSSA Bacteremia 2/2 paraspinal abscess  - Paraspinal abscess with Sepsis due to methicillin susceptible Staphylococcus aureus.       * 10/20 Bcx MSSA, 10/21 negative      * CXR with old rib fracture and small RUL infiltrate      * 10/20 +UA for Ecoli      * MRI: rim-enhancing heterogeneous fluid collection suspicious for an abscess; culture growing GPC in pairs and rare staph aureus       * TTE: no evidence of vegetations      * No Acute surgical intervention per neurosurgery       * S/P paraspinal collection aspiration with paraspinal abscess drain placed with Dr. Guerra 10/25; dislodged accidentally 11/9 with CT showing no retained elements of drain      * Monitor WBC and fever curve      * S/P PICC insertion 10/28      Neurosurgeon contacted regarding recommendations to discontinue TLSO. Recommended wean as tolerated      * Cefazolin 2000mg IV q8 via PICC- until 12/12/24-> Start PO Duricef 500mg BID on 12/13/24 - long term/indefinitely   - Weekly CBC Diff BMP ESR CRP; emailed to [email or web address]  - Activity Limitations: Decreased social, vocational and leisure activities, decreased self care and ADLs, decreased mobility, decreased ability to manage household and finances  - Continue Home Rehab Program  - Scheduled for discharge home tomorrow  -----------------------------------------------------------------------------------  Concurrent Medical Problems    #Paroxysmal atrial fibrillation  - Metoprolol 100mg PO BID for rate control  - Eliquis 5mg PO BID    #Acute on chronic low back pain.   - S/P T9-L3 open fusion with PSO/lag screw partial corpectomy/interbody with complex plastic closure (w/ Dr. Stroud and Dr. Tinajero on 9/29  - TLSO when OOB - to wean as tolerated  - Tylenol 975mg PO TID ATC  - Duloxetine 20mg PO daily  - Lyrica 100mg PO TID   - Flexeril 10mg PO TID standing  - diclofenac gel 2g topically BID  - hydromorphone 4mg PO q8h PRN for severe pain    #HTN  #Orthostatic Hypotension  - 10/1 TTE: EF 61%  - S/P 1L IVF bolus (10/13) for symptomatic hypotension with good effect  - metoprolol tartrate 100mg PO BID  - losartan 25mg PO daily  - Chlorthalidone 25 mg daily on hold (restart if SBP is consistently above 140 per cardio)  - Midodrine 7.5 mg on hold    #HLD  - atorvastatin 40mg PO qhs    #Postoperative anemia  - s/p pRBCs intraoperatively for T spine fusion   - Hgb stable with no evidence of active bleed at this time   - 11/18 Hgb 9.6  - Monitor H/H; transfuse for Hgb <7    #SILVANA (obstructive sleep apnea)  - Transient desaturations on RA    - Nocturnal 2L O2 and PRN during day during naps, monitor sats   - F/u with PCP outpatient as may benefit from sleep study and CPAP    #Restless legs syndrome (RLS)  - pramipexole 0.75mg PO daily   - ferrous sulfate 325mg PO daily started 11/12    #Seasonal allergies  - montelukast 10mg daily    #Liver cyst  -10/10 CT Abd/pelvis- There is a large cyst, stable since prior exam. There are small hypodense foci on segment II and segment I too small to characterize, unchanged since prior  - f/u outpatient     #Elevated serum alkaline phosphatase level  - high serum alk phos levels 247 --> 228 --> 172 --> 233 --> 239--> 240--> 247--> 234---> 276 -->227 --> 183 --> 191  - monitor serum alk phos levels    #Gastritis  - Vonoprazan 10mg PO daily  - erythromycin 128mg oral fluid BID    #Mood/Cognition:  - neuropsychology consult PRN    #Sleep:   - maintain quiet hours and low stim environment  - melatonin 6mg HS PRN to maximize participation in therapy during the day    #GI/Bowel:  - senna 2 tablets PO qhs  - Miralax 17g PO daily PRN for constipation  - lactobacillus acidophilus 1 tablet PO q12h (probiotic)    #/Bladder:   - PVR q8h (SC if > 400)    #Skin/Pressure Injury:   - Skin assessment on admission: Midline sine dressing with drain in place, with minimal drainage. No pressure injuries    #Diet/Supplement  Current Diet: Regular  - ergocalciferol 52000D PO weekly on Fridays  - multivitamin PO daily     #Precautions / PROPHYLAXIS:   - Falls, Spinal  - ortho: Weight bearing status: WBAT, TLSO brace OOB  - Lungs: Incentive Spirometer   - DVT ppx: SCDs, TEDs, Eliquis 5mg PO BID  - Pressure injury/Skin:  OOB to Chair, PT/OT      ---------------  Code Status: FULL  Emergency Contact:    Outpatient Follow-up:    Fran Carr  Internal Medicine  [address] [address]  Phone: [phone]  Fax: [phone]  Follow Up Time:     Kaushal Waterman  Infectious Disease  [address] [place] [address]  Phone: [phone]  Follow Up Time:  [facility] Neurosurgery  Neurosurgery  Referral Assistance Program    Guille Lance  [facility]  ORTHOSURG [address] [facility]  Scheduled Appointment: 11/22/2024    Catalino Cotto  [facility]  WEIGHTMGMT  [facility]  Scheduled Appointment: 12/05/2024    Haydee Bernstein  [facility]  GASTRO [address]  Scheduled Appointment: 01/06/2025      [facility] - Infectious Disease  Infectious Disease  [address] [place] [address], [place] [address]  Phone: [phone]    --------------

## 2024-11-19 NOTE — PROGRESS NOTE ADULT - SUBJECTIVE AND OBJECTIVE BOX
HPI:  Mrs. Haley French is a 74-year-old female patient with past medical history of OA, HTN, gastroparesis, peripheral neuropathy, multiple prior thoracolumbar spine surgeries for congenital scoliosis [place] >[place] w/ removal of hardware (1991) who presented to [facility] on 9/28/24 with back pain and left hip pain s/p mechanical fall. CT imaging was performed and reported a T12 three column spine fracture-malalignment with fracture extending to the adjacent right 12th posterior rib and left T12-L1 facet joint and acute, displaced fracture of the left eighth lateral rib. Chronic appearing bilateral rib deformities. Surgical management was recommended and she is S/P T9-L3 open fusion with PSO/lag screw partial corpectomy/interbody by Dr. Stroud, Neurosurgeon with complex Plastic Surgery closure by Dr. Elias on 9/29/24. Post op course c/b CSK leak s/p repair, new onset paroxsymal Afib with RVR,  increased pain and drainage from surgical incision, development of pleural effusions, symptomatic orthostatic hypotension, urinary retention, post op anemia, and chest pain with spontaneous resolution. Cardiac work up negative. Patient was evaluated by PM&R and therapy for functional deficits, gait/ADL impairments and acute rehabilitation was recommended. Patient was cleared for discharge to [facility] IRF on 10/16/24.    Her admission was remarkable for a developing a fever of 103 with tachycardia and subjective fever/chills. Sepsis w/up was initiated and found to have small right upper lobe infiltrate, UTI, and MSSA bacteremia. CTH negative. Hospitalist and ID were consulted and per G+ bacteremia guidelines was recommended completing infectious workup, which includes MICHAEL which is not available at [facility]. Patient also required PICC line placement for long term ABx and a transfer request initiated from [place] to [facility] for MICHAEL and completion of bacteremia workup/treatment. All other medical co-morbidities were stable. Patient was deemed medically stable for discharge to [facility] medicine on 10/21/24 and was subsequently transferred on 10/23/24 for MICHAEL, advanced spine imaging, and further management. She was found to have a paraspinal abscess on MR imaging and is s/p paraspinal collection aspiration on 10/25/24. Culture grew gram positive cocci in pairs, with rare staph aureus. Neurosurgery advised and no acute surgical revision was deemed necessary. Repeat BCx was negative. Recommendation to continue long-term IV cefazolin via PICC until 12/12/24 (6 wks) with eventual transition to PO Duricef long term/indefinitely per ID. MICHAEL was negative for valvular vegetations but did reveal incidental PFO. Hospital course was significant for recurrent fevers, A fib (resolved) with source control and electrolyte correction, initiated on DOAc given elevated [id number]. Patient was evaluated by PM&R and therapy for functional deficits, gait/ADL impairments and acute rehabilitation was recommended. Patient was cleared for discharge to [facility] IRF on 10/30/24.   (30 Oct 2024 13:05)    TDD 11/20 to home  ___________________________________________________________________________    SUBJECTIVE/ROS  Patient was seen and evaluated at bedside today.  Reported no overnight events and is in no acute distress. She reports continued back soreness attributed to overdoing it in therapy on Friday. She reports the Dilaudid did not improve her pain yesterday; advised her to try again today to see if regimen should be adjusted prior ot discharge. She states she wants to try therapy without medication for now despite her pain and will ask for pain medication if needed.  Denies any CP, SOB, palpitations, cough, or any other symptoms at this time.   ___________________________________________________________________________    VITALS  T(C): 36.7 (11-18-24 @ 21:51), Max: 36.7 (11-18-24 @ 21:51)  HR: 105 (11-19-24 @ 06:08) (89 - 105)  BP: 165/72 (11-19-24 @ 06:08) (105/64 - 165/72)  RR: 15 (11-18-24 @ 21:51) (15 - 15)  SpO2: 95% (11-18-24 @ 21:51) (95% - 95%)  ___________________________________________________________________________    LABS                      9.6    5.17  )-----------( 355      ( 18 Nov 2024 05:55 )             31.8     139  |  102  |  21  ----------------------------<  128[H]  4.2   |  26  |  0.78    Ca    9.1      18 Nov 2024 05:55  TPro  7.1  /  Alb  2.9[L]  /  TBili  0.3  /  DBili  x   /  AST  22  /  ALT  24  /  AlkPhos  191[H]  11-18    ________________________________________________________    MEDICATIONS  (STANDING):  acetaminophen     Tablet .. 975 milliGRAM(s) Oral every 8 hours  apixaban 5 milliGRAM(s) Oral every 12 hours  atorvastatin 40 milliGRAM(s) Oral at bedtime  ceFAZolin   IVPB 2000 milliGRAM(s) IV Intermittent every 8 hours  chlorhexidine 2% Cloths 1 Application(s) Topical daily  cyclobenzaprine 10 milliGRAM(s) Oral three times a day  diclofenac sodium 1% Gel 2 Gram(s) Topical two times a day  DULoxetine 20 milliGRAM(s) Oral daily  ergocalciferol 29093 Unit(s) Oral <User Schedule>  erythromycin    ethylsuccinate Suspension 40 mG/mL 128 milliGRAM(s) Oral every 12 hours  ferrous    sulfate 325 milliGRAM(s) Oral at bedtime  lactobacillus acidophilus 1 Tablet(s) Oral every 12 hours  losartan 25 milliGRAM(s) Oral daily  metoprolol tartrate 100 milliGRAM(s) Oral two times a day  montelukast 10 milliGRAM(s) Oral daily  multivitamin 1 Tablet(s) Oral daily  pramipexole 0.75 milliGRAM(s) Oral <User Schedule>  pregabalin 100 milliGRAM(s) Oral every 8 hours  senna 2 Tablet(s) Oral at bedtime  Vonoprazan (Voquezna) 10mg 1 Tablet(s),Vonoprazan (Voquezna) 10mg 1 Tablet(s) 1 Tablet(s) Oral <User Schedule>    MEDICATIONS  (PRN):  benzocaine/menthol Lozenge 1 Lozenge Oral every 3 hours PRN Mouth Sores  diphenhydrAMINE 25 milliGRAM(s) Oral two times a day PRN Rash and/or Itching  HYDROmorphone   Tablet 4 milliGRAM(s) Oral every 8 hours PRN Severe Pain (7 - 10)  melatonin 6 milliGRAM(s) Oral at bedtime PRN Insomnia  polyethylene glycol 3350 17 Gram(s) Oral two times a day PRN Constipation    ___________________________________________________________________________    PHYSICAL EXAM:    Constitutional - NAD, Comfortable  Chest - good chest expansion, good respiratory effort  Cardio - warm and well perfused  Extremities - No peripheral edema, No calf tenderness   Neurologic Exam:                           Orientation: Awake, A&O to self, place, date, year, situation     Speech - Fluent, Comprehensible, No dysarthria, No aphasia      Motor -                      LEFT    LE - HF 4/5, KE 5/5, DF 5/5, PF 5/5                    RIGHT LE - HF 4/5, KE 5/5, DF 5/5, PF 5/5     Psychiatric - Mood stable, Affect WNL  ___________________________________________________________________________ HPI:  Mrs. Haley French is a 74-year-old female patient with past medical history of OA, HTN, gastroparesis, peripheral neuropathy, multiple prior thoracolumbar spine surgeries for congenital scoliosis [place] >[place] w/ removal of hardware (1991) who presented to [facility] on 9/28/24 with back pain and left hip pain s/p mechanical fall. CT imaging was performed and reported a T12 three column spine fracture-malalignment with fracture extending to the adjacent right 12th posterior rib and left T12-L1 facet joint and acute, displaced fracture of the left eighth lateral rib. Chronic appearing bilateral rib deformities. Surgical management was recommended and she is S/P T9-L3 open fusion with PSO/lag screw partial corpectomy/interbody by Dr. Stroud, Neurosurgeon with complex Plastic Surgery closure by Dr. Elias on 9/29/24. Post op course c/b CSK leak s/p repair, new onset paroxsymal Afib with RVR,  increased pain and drainage from surgical incision, development of pleural effusions, symptomatic orthostatic hypotension, urinary retention, post op anemia, and chest pain with spontaneous resolution. Cardiac work up negative. Patient was evaluated by PM&R and therapy for functional deficits, gait/ADL impairments and acute rehabilitation was recommended. Patient was cleared for discharge to [facility] IRF on 10/16/24.    Her admission was remarkable for a developing a fever of 103 with tachycardia and subjective fever/chills. Sepsis w/up was initiated and found to have small right upper lobe infiltrate, UTI, and MSSA bacteremia. CTH negative. Hospitalist and ID were consulted and per G+ bacteremia guidelines was recommended completing infectious workup, which includes MICHAEL which is not available at [facility]. Patient also required PICC line placement for long term ABx and a transfer request initiated from [place] to [facility] for MICHAEL and completion of bacteremia workup/treatment. All other medical co-morbidities were stable. Patient was deemed medically stable for discharge to [facility] medicine on 10/21/24 and was subsequently transferred on 10/23/24 for MICHAEL, advanced spine imaging, and further management. She was found to have a paraspinal abscess on MR imaging and is s/p paraspinal collection aspiration on 10/25/24. Culture grew gram positive cocci in pairs, with rare staph aureus. Neurosurgery advised and no acute surgical revision was deemed necessary. Repeat BCx was negative. Recommendation to continue long-term IV cefazolin via PICC until 12/12/24 (6 wks) with eventual transition to PO Duricef long term/indefinitely per ID. MICHAEL was negative for valvular vegetations but did reveal incidental PFO. Hospital course was significant for recurrent fevers, A fib (resolved) with source control and electrolyte correction, initiated on DOAc given elevated [id number]. Patient was evaluated by PM&R and therapy for functional deficits, gait/ADL impairments and acute rehabilitation was recommended. Patient was cleared for discharge to [facility] IRF on 10/30/24.   (30 Oct 2024 13:05)    TDD 11/20 to Home  ___________________________________________________________________________    SUBJECTIVE/ROS  Patient was seen and evaluated at bedside today.  Reported no overnight events and is in no acute distress. She reports continued back soreness attributed to overdoing it in therapy on Friday. She reports the Dilaudid did not improve her pain yesterday; advised her to try again today to see if regimen should be adjusted prior ot discharge. She states she wants to try therapy without medication for now despite her pain and will ask for pain medication if needed. Scheduled for discharge home tomorrow.  Denies any CP, SOB, palpitations, cough, or any other symptoms at this time.   ___________________________________________________________________________    VITALS  T(C): 36.7 (11-18-24 @ 21:51), Max: 36.7 (11-18-24 @ 21:51)  HR: 105 (11-19-24 @ 06:08) (89 - 105)  BP: 165/72 (11-19-24 @ 06:08) (105/64 - 165/72)  RR: 15 (11-18-24 @ 21:51) (15 - 15)  SpO2: 95% (11-18-24 @ 21:51) (95% - 95%)  ___________________________________________________________________________    LABS                      9.6    5.17  )-----------( 355      ( 18 Nov 2024 05:55 )             31.8       139  |  102  |  21  ----------------------------<  128[H]  4.2   |  26  |  0.78    Ca    9.1      18 Nov 2024 05:55  TPro  7.1  /  Alb  2.9[L]  /  TBili  0.3  /  DBili  x   /  AST  22  /  ALT  24  /  AlkPhos  191[H]  11-18    ________________________________________________________    MEDICATIONS  (STANDING):  acetaminophen     Tablet .. 975 milliGRAM(s) Oral every 8 hours  apixaban 5 milliGRAM(s) Oral every 12 hours  atorvastatin 40 milliGRAM(s) Oral at bedtime  ceFAZolin   IVPB 2000 milliGRAM(s) IV Intermittent every 8 hours  chlorhexidine 2% Cloths 1 Application(s) Topical daily  cyclobenzaprine 10 milliGRAM(s) Oral three times a day  diclofenac sodium 1% Gel 2 Gram(s) Topical two times a day  DULoxetine 20 milliGRAM(s) Oral daily  ergocalciferol 99715 Unit(s) Oral <User Schedule>  erythromycin    ethylsuccinate Suspension 40 mG/mL 128 milliGRAM(s) Oral every 12 hours  ferrous    sulfate 325 milliGRAM(s) Oral at bedtime  lactobacillus acidophilus 1 Tablet(s) Oral every 12 hours  losartan 25 milliGRAM(s) Oral daily  metoprolol tartrate 100 milliGRAM(s) Oral two times a day  montelukast 10 milliGRAM(s) Oral daily  multivitamin 1 Tablet(s) Oral daily  pramipexole 0.75 milliGRAM(s) Oral <User Schedule>  pregabalin 100 milliGRAM(s) Oral every 8 hours  senna 2 Tablet(s) Oral at bedtime  Vonoprazan (Voquezna) 10mg 1 Tablet(s),Vonoprazan (Voquezna) 10mg 1 Tablet(s) 1 Tablet(s) Oral <User Schedule>    MEDICATIONS  (PRN):  benzocaine/menthol Lozenge 1 Lozenge Oral every 3 hours PRN Mouth Sores  diphenhydrAMINE 25 milliGRAM(s) Oral two times a day PRN Rash and/or Itching  HYDROmorphone   Tablet 4 milliGRAM(s) Oral every 8 hours PRN Severe Pain (7 - 10)  melatonin 6 milliGRAM(s) Oral at bedtime PRN Insomnia  polyethylene glycol 3350 17 Gram(s) Oral two times a day PRN Constipation    ___________________________________________________________________________    PHYSICAL EXAM:    Constitutional - NAD, Comfortable  Chest - good chest expansion, good respiratory effort  Cardio - warm and well perfused  Extremities - No peripheral edema, No calf tenderness   Neurologic Exam:                           Orientation: Awake, A&O to self, place, date, year, situation     Speech - Fluent, Comprehensible, No dysarthria, No aphasia      Motor -                      LEFT    LE - HF 4/5, KE 5/5, DF 5/5, PF 5/5                    RIGHT LE - HF 4/5, KE 5/5, DF 5/5, PF 5/5     Psychiatric - Mood stable, Affect WNL  ___________________________________________________________________________

## 2024-11-19 NOTE — PROGRESS NOTE ADULT - SUBJECTIVE AND OBJECTIVE BOX
Interval History  Patient seen and examined at bedside. No acute events noted.  Patient denies any acute complaints, continues to have back spasm - slightly improved compared to yesterday. No other complaints.     ALLERGIES:  Dilantin (Urticaria)  penicillins (Urticaria)  erythromycin (Stomach Upset; Vomiting; Nausea)    MEDICATIONS  (STANDING):  acetaminophen     Tablet .. 975 milliGRAM(s) Oral every 8 hours  apixaban 5 milliGRAM(s) Oral every 12 hours  atorvastatin 40 milliGRAM(s) Oral at bedtime  ceFAZolin   IVPB 2000 milliGRAM(s) IV Intermittent every 8 hours  chlorhexidine 2% Cloths 1 Application(s) Topical daily  cyclobenzaprine 10 milliGRAM(s) Oral three times a day  diclofenac sodium 1% Gel 2 Gram(s) Topical two times a day  DULoxetine 20 milliGRAM(s) Oral daily  ergocalciferol 37786 Unit(s) Oral <User Schedule>  erythromycin    ethylsuccinate Suspension 40 mG/mL 128 milliGRAM(s) Oral every 12 hours  ferrous    sulfate 325 milliGRAM(s) Oral at bedtime  lactobacillus acidophilus 1 Tablet(s) Oral every 12 hours  lidocaine   4% Patch 1 Patch Transdermal daily  losartan 25 milliGRAM(s) Oral daily  metoprolol tartrate 100 milliGRAM(s) Oral two times a day  montelukast 10 milliGRAM(s) Oral daily  multivitamin 1 Tablet(s) Oral daily  pramipexole 0.75 milliGRAM(s) Oral <User Schedule>  pregabalin 100 milliGRAM(s) Oral every 8 hours  senna 2 Tablet(s) Oral at bedtime  Vonoprazan (Voquezna) 10mg 1 Tablet(s),Vonoprazan (Voquezna) 10mg 1 Tablet(s) 1 Tablet(s) Oral <User Schedule>    MEDICATIONS  (PRN):  benzocaine/menthol Lozenge 1 Lozenge Oral every 3 hours PRN Mouth Sores  diphenhydrAMINE 25 milliGRAM(s) Oral two times a day PRN Rash and/or Itching  HYDROmorphone   Tablet 4 milliGRAM(s) Oral every 8 hours PRN Severe Pain (7 - 10)  melatonin 6 milliGRAM(s) Oral at bedtime PRN Insomnia  polyethylene glycol 3350 17 Gram(s) Oral two times a day PRN Constipation    Vital Signs Last 24 Hrs  T(F): 98.5 (19 Nov 2024 12:25), Max: 98.5 (19 Nov 2024 12:25)  HR: 75 (19 Nov 2024 12:25) (75 - 105)  BP: 122/74 (19 Nov 2024 12:25) (105/64 - 165/72)  RR: 16 (19 Nov 2024 12:25) (15 - 16)  SpO2: 97% (19 Nov 2024 12:25) (95% - 97%)  I&O's Summary    PHYSICAL EXAM:  GENERAL: NAD  HEENT: NCAT  CHEST/LUNG: Clear to percussion bilaterally; No rales, rhonchi, wheezing  HEART: Regular rate and rhythm; No murmurs  ABDOMEN: Soft, Nontender, Nondistended; Bowel sounds present  MUSCULOSKELETAL/EXTREMITIES:  2+ Peripheral Pulses, No LE edema. Spine incision well healed, no evidence of infection. Paraspinal tenderness on palpation bilaterally   PSYCH: Appropriate affect  NEURO: Alert & Oriented x 3    I personally reviewed the below data/images/labs:    LABS:                        9.6    5.17  )-----------( 355      ( 18 Nov 2024 05:55 )             31.8       11-18    139  |  102  |  21  ----------------------------<  128  4.2   |  26  |  0.78    Ca    9.1      18 Nov 2024 05:55    TPro  7.1  /  Alb  2.9  /  TBili  0.3  /  DBili  x   /  AST  22  /  ALT  24  /  AlkPhos  191  11-18     Urinalysis Basic - ( 18 Nov 2024 05:55 )    Color: x / Appearance: x / SG: x / pH: x  Gluc: 128 mg/dL / Ketone: x  / Bili: x / Urobili: x   Blood: x / Protein: x / Nitrite: x   Leuk Esterase: x / RBC: x / WBC x   Sq Epi: x / Non Sq Epi: x / Bacteria: x    COVID-19 PCR: NotDetec (10-30-24 @ 21:45)  COVID-19 PCR: NotDetec (10-21-24 @ 09:20)    Consultant(s) Notes Reviewed:   Care Discused with Consultants/Other Providers:  Imaging Personally Reviewed:

## 2024-11-19 NOTE — DISCHARGE NOTE NURSING/CASE MANAGEMENT/SOCIAL WORK - NSSCNAMETXT_GEN_ALL_CORE
Left message for patient to call back regarding a referral from Cecelia Samuel RN.      Maile Glez, [facility]   Population Health Pharmacy Technician   [phone]        [facility]

## 2024-11-19 NOTE — DISCHARGE NOTE NURSING/CASE MANAGEMENT/SOCIAL WORK - NSDCFUADDAPPT_GEN_ALL_CORE_FT
Please follow up with Infectious disease within two weeks of discharge  [facility] rehab.    Please follow up with your primary care provider within two weeks of discharge from [facility] re[facility].    Please follow up with Dr Armendariz (your Rehab physician) within 6-8 weeks after discharge from [facility] rehab.

## 2024-11-20 VITALS
TEMPERATURE: 98 F | RESPIRATION RATE: 15 BRPM | HEART RATE: 85 BPM | DIASTOLIC BLOOD PRESSURE: 77 MMHG | SYSTOLIC BLOOD PRESSURE: 129 MMHG | OXYGEN SATURATION: 94 %

## 2024-11-20 PROCEDURE — 99239 HOSP IP/OBS DSCHRG MGMT >30: CPT

## 2024-11-20 RX ORDER — HYDROMORPHONE HYDROCHLORIDE 2 MG/1
1 TABLET ORAL
Qty: 21 | Refills: 0
Start: 2024-11-20 | End: 2024-11-26

## 2024-11-20 RX ADMIN — Medication 10 MILLIGRAM(S): at 05:39

## 2024-11-20 RX ADMIN — METOPROLOL TARTRATE 100 MILLIGRAM(S): 100 TABLET, FILM COATED ORAL at 05:38

## 2024-11-20 RX ADMIN — CHLORHEXIDINE GLUCONATE 1 APPLICATION(S): 1.2 RINSE ORAL at 05:43

## 2024-11-20 RX ADMIN — PREGABALIN 100 MILLIGRAM(S): 75 CAPSULE ORAL at 05:38

## 2024-11-20 RX ADMIN — Medication 1 TABLET(S): at 05:39

## 2024-11-20 RX ADMIN — DICLOFENAC SODIUM 2 GRAM(S): 20 SOLUTION TOPICAL at 05:43

## 2024-11-20 RX ADMIN — Medication 20 MILLIGRAM(S): at 11:03

## 2024-11-20 RX ADMIN — MONTELUKAST SODIUM 10 MILLIGRAM(S): 10 TABLET ORAL at 11:03

## 2024-11-20 RX ADMIN — Medication 1 TABLET(S): at 11:03

## 2024-11-20 RX ADMIN — APIXABAN 5 MILLIGRAM(S): 2.5 TABLET, FILM COATED ORAL at 05:38

## 2024-11-20 RX ADMIN — HYDROMORPHONE HYDROCHLORIDE 4 MILLIGRAM(S): 2 TABLET ORAL at 07:30

## 2024-11-20 RX ADMIN — LIDOCAINE 1 PATCH: 40 CREAM TOPICAL at 11:03

## 2024-11-20 RX ADMIN — Medication 100 MILLIGRAM(S): at 05:31

## 2024-11-20 RX ADMIN — LOSARTAN POTASSIUM 25 MILLIGRAM(S): 100 TABLET, FILM COATED ORAL at 05:39

## 2024-11-20 RX ADMIN — ACETAMINOPHEN 500MG 975 MILLIGRAM(S): 500 TABLET, COATED ORAL at 05:39

## 2024-11-20 RX ADMIN — HYDROMORPHONE HYDROCHLORIDE 4 MILLIGRAM(S): 2 TABLET ORAL at 08:30

## 2024-11-20 NOTE — PROGRESS NOTE ADULT - ASSESSMENT
Assessment/Plan:  Mrs. Haley French is a 74-year-old woman with history of OA, HTN, gastroparesis, peripheral neuropathy, multiple prior thoracolumbar spine surgeries for congenital scoliosis [place] >[place]/ [place] of hardware (1991) who is admitted for Acute Inpatient Rehabilitation with a multidisciplinary rehab program at [facility] with functional impairments in ADLs and mobility secondary to mechanical fall reporting a T12 three column spine fracture-malalignment treated surgically with a T9-L3 open fusion with PSO/lag screw partial corpectomy/interbody by Dr. Stroud, Neurosurgeon with complex Plastic Surgery closure by Dr. Elias on 9/29/24 subsequently complicated with MSSA bacteremia secondary to a paraspinal abscess s/p paraspinal collection aspiration on 10/25/24.     #Traumatic T9-L3 vertebral fracture s/p corpectomy and fusion surgery c/b MSSA Bacteremia 2/2 paraspinal abscess  - Paraspinal abscess with Sepsis due to methicillin susceptible Staphylococcus aureus.       * 10/20 Bcx MSSA, 10/21 negative      * CXR with old rib fracture and small RUL infiltrate      * 10/20 +UA for Ecoli      * MRI: rim-enhancing heterogeneous fluid collection suspicious for an abscess; culture growing GPC in pairs and rare staph aureus       * TTE: no evidence of vegetations      * No Acute surgical intervention per neurosurgery       * S/P paraspinal collection aspiration with paraspinal abscess drain placed with Dr. Guerra 10/25; dislodged accidentally 11/9 with CT showing no retained elements of drain      * Monitor WBC and fever curve      * S/P PICC insertion 10/28      Neurosurgeon contacted regarding recommendations to discontinue TLSO. Recommended wean as tolerated      * Cefazolin 2000mg IV q8 via PICC- until 12/12/24-> Start PO Duricef 500mg BID on 12/13/24 - long term/indefinitely   - Weekly CBC Diff BMP ESR CRP; emailed to [email or web address]  - Activity Limitations: Decreased social, vocational and leisure activities, decreased self care and ADLs, decreased mobility, decreased ability to manage household and finances  - Continue Home Rehab Program as outpatient  - Scheduled for discharge home today  -----------------------------------------------------------------------------------  Concurrent Medical Problems    #Paroxysmal atrial fibrillation  - Metoprolol 100mg PO BID for rate control  - Eliquis 5mg PO BID    #Acute on chronic low back pain.   - S/P T9-L3 open fusion with PSO/lag screw partial corpectomy/interbody with complex plastic closure (w/ Dr. Stroud and Dr. Tinajero on 9/29  - TLSO when OOB - to wean as tolerated  - Tylenol 975mg PO TID ATC  - Duloxetine 20mg PO daily  - Lyrica 100mg PO TID   - Flexeril 10mg PO TID standing  - diclofenac gel 2g topically BID  - hydromorphone 4mg PO q8h PRN for severe pain    #HTN  #Orthostatic Hypotension  - 10/1 TTE: EF 61%  - S/P 1L IVF bolus (10/13) for symptomatic hypotension with good effect  - metoprolol tartrate 100mg PO BID  - losartan 25mg PO daily  - Chlorthalidone 25 mg daily on hold (restart if SBP is consistently above 140 per cardio)  - Midodrine 7.5 mg on hold    #HLD  - atorvastatin 40mg PO qhs    #Postoperative anemia  - s/p pRBCs intraoperatively for T spine fusion   - Hgb stable with no evidence of active bleed at this time   - 11/18 Hgb 9.6  - Monitor H/H; transfuse for Hgb <7    #SILVANA (obstructive sleep apnea)  - Transient desaturations on RA    - Nocturnal 2L O2 and PRN during day during naps, monitor sats   - F/u with PCP outpatient as may benefit from sleep study and CPAP    #Restless legs syndrome (RLS)  - pramipexole 0.75mg PO daily   - ferrous sulfate 325mg PO daily started 11/12    #Seasonal allergies  - montelukast 10mg daily    #Liver cyst  -10/10 CT Abd/pelvis- There is a large cyst, stable since prior exam. There are small hypodense foci on segment II and segment I too small to characterize, unchanged since prior  - f/u outpatient     #Elevated serum alkaline phosphatase level  - high serum alk phos levels 247 --> 228 --> 172 --> 233 --> 239--> 240--> 247--> 234---> 276 -->227 --> 183 --> 191  - monitor serum alk phos levels    #Gastritis  - Vonoprazan 10mg PO daily  - erythromycin 128mg oral fluid BID    #Mood/Cognition:  - neuropsychology consult PRN    #Sleep:   - maintain quiet hours and low stim environment  - melatonin 6mg HS PRN to maximize participation in therapy during the day    #GI/Bowel:  - senna 2 tablets PO qhs  - Miralax 17g PO daily PRN for constipation  - lactobacillus acidophilus 1 tablet PO q12h (probiotic)    #/Bladder:   - PVR q8h (SC if > 400)    #Skin/Pressure Injury:   - Skin assessment on admission: Midline sine dressing with drain in place, with minimal drainage. No pressure injuries    #Diet/Supplement  Current Diet: Regular  - ergocalciferol 78671X PO weekly on Fridays  - multivitamin PO daily     #Precautions / PROPHYLAXIS:   - Falls, Spinal  - ortho: Weight bearing status: WBAT, TLSO brace OOB  - Lungs: Incentive Spirometer   - DVT ppx: SCDs, TEDs, Eliquis 5mg PO BID  - Pressure injury/Skin:  OOB to Chair, PT/OT      ---------------  Code Status: FULL  Emergency Contact:    Outpatient Follow-up:    Fran Carr  Internal Medicine  [address] [address]  Phone: [phone]  Fax: [phone]  Follow Up Time:     Kaushal Waterman  Infectious Disease  [address] [place] [id number]  Phone: [phone]  Follow Up Time:  [facility]  Neurosurgery  Referral Assistance Program    Guille Lance  [facility]  ORTHOSURG [address] [facility]  Scheduled Appointment: 11/22/2024    Catalino Cotto  [facility]  WEIGHTMGMT  [facility]  Scheduled Appointment: 12/05/2024    Haydee Bernstein  [facility]  GASTRO [address]  Scheduled Appointment: 01/06/2025      [facility] - Infectious Disease  Infectious Disease  [address] [place] [address], [place] [address]  Phone: [phone]    --------------

## 2024-11-20 NOTE — PROGRESS NOTE ADULT - PROVIDER SPECIALTY LIST ADULT
Hospitalist
Physiatry
Rehab Medicine
Hospitalist
Physiatry
Rehab Medicine
Hospitalist
Physiatry
Hospitalist
Physiatry
Rehab Medicine
Hospitalist

## 2024-11-20 NOTE — PROGRESS NOTE ADULT - SUBJECTIVE AND OBJECTIVE BOX
HPI:  Mrs. Haley French is a 74-year-old female patient with past medical history of OA, HTN, gastroparesis, peripheral neuropathy, multiple prior thoracolumbar spine surgeries for congenital scoliosis done >10 years ago out of state w/ removal of hardware (1991) who presented to [facility] on 9/28/24 with back pain and left hip pain s/p mechanical fall. CT imaging was performed and reported a T12 three column spine fracture-malalignment with fracture extending to the adjacent right 12th posterior rib and left T12-L1 facet joint and acute, displaced fracture of the left eighth lateral rib. Chronic appearing bilateral rib deformities. Surgical management was recommended and she is S/P T9-L3 open fusion with PSO/lag screw partial corpectomy/interbody by Dr. Stroud, Neurosurgeon with complex Plastic Surgery closure by Dr. Elias on 9/29/24. Post op course c/b CSK leak s/p repair, new onset paroxsymal Afib with RVR,  increased pain and drainage from surgical incision, development of pleural effusions, symptomatic orthostatic hypotension, urinary retention, post op anemia, and chest pain with spontaneous resolution. Cardiac work up negative. Patient was evaluated by PM&R and therapy for functional deficits, gait/ADL impairments and acute rehabilitation was recommended. Patient was cleared for discharge to [facility] IRF on 10/16/24.    Her admission was remarkable for a developing a fever of 103 with tachycardia and subjective fever/chills. Sepsis w/up was initiated and found to have small right upper lobe infiltrate, UTI, and MSSA bacteremia. CTH negative. Hospitalist and ID were consulted and per G+ bacteremia guidelines was recommended completing infectious workup, which includes MICHAEL which is not available at [facility]. Patient also required PICC line placement for long term ABx and a transfer request initiated from [place] to [facility] for MICHAEL and completion of bacteremia workup/treatment. All other medical co-morbidities were stable. Patient was deemed medically stable for discharge to [facility] medicine on 10/21/24 and was subsequently transferred on 10/23/24 for MICHAEL, advanced spine imaging, and further management. She was found to have a paraspinal abscess on MR imaging and is s/p paraspinal collection aspiration on 10/25/24. Culture grew gram positive cocci in pairs, with rare staph aureus. Neurosurgery advised and no acute surgical revision was deemed necessary. Repeat BCx was negative. Recommendation to continue long-term IV cefazolin via PICC until 12/12/24 (6 wks) with eventual transition to PO Duricef long term/indefinitely per ID. MICHAEL was negative for valvular vegetations but did reveal incidental PFO. Hospital course was significant for recurrent fevers, A fib (resolved) with source control and electrolyte correction, initiated on DOAc given elevated [id number]. Patient was evaluated by PM&R and therapy for functional deficits, gait/ADL impairments and acute rehabilitation was recommended. Patient was cleared for discharge to [facility] IRF on 10/30/24.   (30 Oct 2024 13:05)    TDD 11/20 to Home  ___________________________________________________________________________    SUBJECTIVE/ROS  Patient was seen and evaluated at bedside today.  Reported no overnight events and is in no acute distress.   She reports some improvement on her lower back soreness with lidocaine patch.  She states she wants to try therapy without medication for now despite her pain and will ask for pain medication if needed.   Scheduled for discharge home today.  Denies any CP, SOB, palpitations, cough, or any other symptoms at this time.   ___________________________________________________________________________    Vital Signs Last 24 Hrs  T(C): 36.6 (19 Nov 2024 20:19), Max: 36.9 (19 Nov 2024 12:25)  T(F): 97.9 (19 Nov 2024 20:19), Max: 98.5 (19 Nov 2024 12:25)  HR: 83 (20 Nov 2024 05:41) (75 - 90)  BP: 129/69 (20 Nov 2024 05:41) (118/65 - 129/69)  RR: 16 (19 Nov 2024 20:19) (16 - 16)  SpO2: 96% (19 Nov 2024 20:19) (96% - 97%)    __________________________________________________________________________    LABS                        9.6    5.17  )-----------( 355      ( 18 Nov 2024 05:55 )             31.8       139  |  102  |  21  ----------------------------<  128[H]  4.2   |  26  |  0.78    Ca    9.1      18 Nov 2024 05:55  TPro  7.1  /  Alb  2.9[L]  /  TBili  0.3  /  DBili  x   /  AST  22  /  ALT  24  /  AlkPhos  191[H]  11-18    ESR Historical Values  Sedimentation Rate, Erythrocyte (11.18.24 @ 05:55)   Sedimentation Rate, Erythrocyte: 95 mm/hr  Sedimentation Rate, Erythrocyte (11.14.24 @ 06:01)   Sedimentation Rate, Erythrocyte: 87 mm/hr  Sedimentation Rate, Erythrocyte (11.11.24 @ 06:50)   Sedimentation Rate, Erythrocyte: 91 mm/hr  Sedimentation Rate, Erythrocyte (11.07.24 @ 06:31)   Sedimentation Rate, Erythrocyte: 93 mm/hr    CRP Historical Values  C-Reactive Protein (11.18.24 @ 05:55)   C-Reactive Protein: 33 mg/L  C-Reactive Protein (11.14.24 @ 06:01)   C-Reactive Protein: 37 mg/L  C-Reactive Protein (11.11.24 @ 06:50)   C-Reactive Protein: 51 mg/L  C-Reactive Protein (11.07.24 @ 06:31)   C-Reactive Protein: 30 mg/L    ________________________________________________________    MEDICATIONS  (STANDING):  acetaminophen     Tablet .. 975 milliGRAM(s) Oral every 8 hours  apixaban 5 milliGRAM(s) Oral every 12 hours  atorvastatin 40 milliGRAM(s) Oral at bedtime  ceFAZolin   IVPB 2000 milliGRAM(s) IV Intermittent every 8 hours  chlorhexidine 2% Cloths 1 Application(s) Topical daily  cyclobenzaprine 10 milliGRAM(s) Oral three times a day  diclofenac sodium 1% Gel 2 Gram(s) Topical two times a day  DULoxetine 20 milliGRAM(s) Oral daily  ergocalciferol 62012 Unit(s) Oral <User Schedule>  erythromycin    ethylsuccinate Suspension 40 mG/mL 128 milliGRAM(s) Oral every 12 hours  ferrous    sulfate 325 milliGRAM(s) Oral at bedtime  lactobacillus acidophilus 1 Tablet(s) Oral every 12 hours  lidocaine   4% Patch 1 Patch Transdermal daily  losartan 25 milliGRAM(s) Oral daily  metoprolol tartrate 100 milliGRAM(s) Oral two times a day  montelukast 10 milliGRAM(s) Oral daily  multivitamin 1 Tablet(s) Oral daily  pramipexole 0.75 milliGRAM(s) Oral <User Schedule>  pregabalin 100 milliGRAM(s) Oral every 8 hours  senna 2 Tablet(s) Oral at bedtime  Vonoprazan (Voquezna) 10mg 1 Tablet(s),Vonoprazan (Voquezna) 10mg 1 Tablet(s) 1 Tablet(s) Oral <User Schedule>    MEDICATIONS  (PRN):  benzocaine/menthol Lozenge 1 Lozenge Oral every 3 hours PRN Mouth Sores  diphenhydrAMINE 25 milliGRAM(s) Oral two times a day PRN Rash and/or Itching  HYDROmorphone   Tablet 4 milliGRAM(s) Oral every 8 hours PRN Severe Pain (7 - 10)  melatonin 6 milliGRAM(s) Oral at bedtime PRN Insomnia  polyethylene glycol 3350 17 Gram(s) Oral two times a day PRN Constipation    ___________________________________________________________________________    PHYSICAL EXAM:    Constitutional - NAD, Comfortable  Chest - good chest expansion, good respiratory effort  Cardio - warm and well perfused  Extremities - No peripheral edema, No calf tenderness   Neurologic Exam:                           Orientation: Awake, A&O to self, place, date, year, situation     Speech - Fluent, Comprehensible, No dysarthria, No aphasia      Motor -                      LEFT    LE - HF 4/5, KE 5/5, DF 5/5, PF 5/5                    RIGHT LE - HF 4/5, KE 5/5, DF 5/5, PF 5/5     Psychiatric - Mood stable, Affect WNL  ___________________________________________________________________________

## 2024-11-20 NOTE — PROGRESS NOTE ADULT - REASON FOR ADMISSION
Traumatic T9-L3 vertebral fracture s/p corpectomy and fusion surgery c/b MSSA Bacteremia 2/2 paraspinal abscess
MSSA Bacteremia 2/2 paraspinal abscess s/p thoracolumbar (T9-L3) posterior fusion of T Spine revision
Traumatic T9-L3 vertebral fracture s/p corpectomy and fusion surgery c/b MSSA Bacteremia 2/2 paraspinal abscess
MSSA Bacteremia 2/2 paraspinal abscess s/p thoracolumbar (T9-L3) posterior fusion of T Spine revision
MSSA Bacteremia 2/2 paraspinal abscess s/p thoracolumbar (T9-L3) posterior fusion of T Spine revision
Traumatic T9-L3 vertebral fracture s/p corpectomy and fusion surgery c/b MSSA Bacteremia 2/2 paraspinal abscess
MSSA Bacteremia 2/2 paraspinal abscess s/p thoracolumbar (T9-L3) posterior fusion of T Spine revision
MSSA Bacteremia 2/2 paraspinal abscess s/p thoracolumbar (T9-L3) posterior fusion of T Spine revision
Traumatic T9-L3 vertebral fracture s/p corpectomy and fusion surgery c/b MSSA Bacteremia 2/2 paraspinal abscess
MSSA Bacteremia 2/2 paraspinal abscess s/p thoracolumbar (T9-L3) posterior fusion of T Spine revision
Traumatic T9-L3 vertebral fracture s/p corpectomy and fusion surgery c/b MSSA Bacteremia 2/2 paraspinal abscess

## 2024-11-22 ENCOUNTER — APPOINTMENT (OUTPATIENT)
Dept: ORTHOPEDIC SURGERY | Facility: CLINIC | Age: 74
End: 2024-11-22

## 2024-11-23 ENCOUNTER — NON-APPOINTMENT (OUTPATIENT)
Age: 74
End: 2024-11-23

## 2024-11-26 PROCEDURE — 86900 BLOOD TYPING SEROLOGIC ABO: CPT

## 2024-11-26 PROCEDURE — 72020 X-RAY EXAM OF SPINE 1 VIEW: CPT

## 2024-11-26 PROCEDURE — C1769: CPT

## 2024-11-26 PROCEDURE — 93005 ELECTROCARDIOGRAM TRACING: CPT

## 2024-11-26 PROCEDURE — 85027 COMPLETE CBC AUTOMATED: CPT

## 2024-11-26 PROCEDURE — 82947 ASSAY GLUCOSE BLOOD QUANT: CPT

## 2024-11-26 PROCEDURE — 74176 CT ABD & PELVIS W/O CONTRAST: CPT | Mod: MC

## 2024-11-26 PROCEDURE — 81001 URINALYSIS AUTO W/SCOPE: CPT

## 2024-11-26 PROCEDURE — 72131 CT LUMBAR SPINE W/O DYE: CPT | Mod: MC

## 2024-11-26 PROCEDURE — 85018 HEMOGLOBIN: CPT

## 2024-11-26 PROCEDURE — 36415 COLL VENOUS BLD VENIPUNCTURE: CPT

## 2024-11-26 PROCEDURE — 87635 SARS-COV-2 COVID-19 AMP PRB: CPT

## 2024-11-26 PROCEDURE — 97116 GAIT TRAINING THERAPY: CPT

## 2024-11-26 PROCEDURE — 96376 TX/PRO/DX INJ SAME DRUG ADON: CPT

## 2024-11-26 PROCEDURE — 85610 PROTHROMBIN TIME: CPT

## 2024-11-26 PROCEDURE — 73521 X-RAY EXAM HIPS BI 2 VIEWS: CPT

## 2024-11-26 PROCEDURE — 87205 SMEAR GRAM STAIN: CPT

## 2024-11-26 PROCEDURE — 87186 SC STD MICRODIL/AGAR DIL: CPT

## 2024-11-26 PROCEDURE — 97110 THERAPEUTIC EXERCISES: CPT

## 2024-11-26 PROCEDURE — 70450 CT HEAD/BRAIN W/O DYE: CPT | Mod: MC

## 2024-11-26 PROCEDURE — 93970 EXTREMITY STUDY: CPT

## 2024-11-26 PROCEDURE — 87086 URINE CULTURE/COLONY COUNT: CPT

## 2024-11-26 PROCEDURE — 93312 ECHO TRANSESOPHAGEAL: CPT

## 2024-11-26 PROCEDURE — 97530 THERAPEUTIC ACTIVITIES: CPT | Mod: GP

## 2024-11-26 PROCEDURE — 93325 DOPPLER ECHO COLOR FLOW MAPG: CPT

## 2024-11-26 PROCEDURE — 72128 CT CHEST SPINE W/O DYE: CPT | Mod: MC

## 2024-11-26 PROCEDURE — C1751: CPT

## 2024-11-26 PROCEDURE — 97112 NEUROMUSCULAR REEDUCATION: CPT

## 2024-11-26 PROCEDURE — 99285 EMERGENCY DEPT VISIT HI MDM: CPT

## 2024-11-26 PROCEDURE — 80048 BASIC METABOLIC PNL TOTAL CA: CPT

## 2024-11-26 PROCEDURE — C1889: CPT

## 2024-11-26 PROCEDURE — 96374 THER/PROPH/DIAG INJ IV PUSH: CPT

## 2024-11-26 PROCEDURE — 82962 GLUCOSE BLOOD TEST: CPT

## 2024-11-26 PROCEDURE — 85730 THROMBOPLASTIN TIME PARTIAL: CPT

## 2024-11-26 PROCEDURE — 76000 FLUOROSCOPY <1 HR PHYS/QHP: CPT

## 2024-11-26 PROCEDURE — 83550 IRON BINDING TEST: CPT

## 2024-11-26 PROCEDURE — 74177 CT ABD & PELVIS W/CONTRAST: CPT | Mod: MC

## 2024-11-26 PROCEDURE — 87070 CULTURE OTHR SPECIMN AEROBIC: CPT

## 2024-11-26 PROCEDURE — 97165 OT EVAL LOW COMPLEX 30 MIN: CPT | Mod: GO

## 2024-11-26 PROCEDURE — 97167 OT EVAL HIGH COMPLEX 60 MIN: CPT

## 2024-11-26 PROCEDURE — 74018 RADEX ABDOMEN 1 VIEW: CPT

## 2024-11-26 PROCEDURE — 97535 SELF CARE MNGMENT TRAINING: CPT

## 2024-11-26 PROCEDURE — 80053 COMPREHEN METABOLIC PANEL: CPT

## 2024-11-26 PROCEDURE — 84132 ASSAY OF SERUM POTASSIUM: CPT

## 2024-11-26 PROCEDURE — 87015 SPECIMEN INFECT AGNT CONCNTJ: CPT

## 2024-11-26 PROCEDURE — 97161 PT EVAL LOW COMPLEX 20 MIN: CPT | Mod: GP

## 2024-11-26 PROCEDURE — 85025 COMPLETE CBC W/AUTO DIFF WBC: CPT

## 2024-11-26 PROCEDURE — 93320 DOPPLER ECHO COMPLETE: CPT

## 2024-11-26 PROCEDURE — 71260 CT THORAX DX C+: CPT | Mod: MC

## 2024-11-26 PROCEDURE — 85045 AUTOMATED RETICULOCYTE COUNT: CPT

## 2024-11-26 PROCEDURE — 72100 X-RAY EXAM L-S SPINE 2/3 VWS: CPT

## 2024-11-26 PROCEDURE — 85014 HEMATOCRIT: CPT

## 2024-11-26 PROCEDURE — C1729: CPT

## 2024-11-26 PROCEDURE — 97535 SELF CARE MNGMENT TRAINING: CPT | Mod: GO

## 2024-11-26 PROCEDURE — 93306 TTE W/DOPPLER COMPLETE: CPT

## 2024-11-26 PROCEDURE — 86850 RBC ANTIBODY SCREEN: CPT

## 2024-11-26 PROCEDURE — 83735 ASSAY OF MAGNESIUM: CPT

## 2024-11-26 PROCEDURE — 82435 ASSAY OF BLOOD CHLORIDE: CPT

## 2024-11-26 PROCEDURE — 71045 X-RAY EXAM CHEST 1 VIEW: CPT

## 2024-11-26 PROCEDURE — 84100 ASSAY OF PHOSPHORUS: CPT

## 2024-11-26 PROCEDURE — 36569 INSJ PICC 5 YR+ W/O IMAGING: CPT

## 2024-11-26 PROCEDURE — 97161 PT EVAL LOW COMPLEX 20 MIN: CPT

## 2024-11-26 PROCEDURE — 83605 ASSAY OF LACTIC ACID: CPT

## 2024-11-26 PROCEDURE — 97110 THERAPEUTIC EXERCISES: CPT | Mod: GO

## 2024-11-26 PROCEDURE — 87150 DNA/RNA AMPLIFIED PROBE: CPT

## 2024-11-26 PROCEDURE — 72158 MRI LUMBAR SPINE W/O & W/DYE: CPT | Mod: MC

## 2024-11-26 PROCEDURE — 89051 BODY FLUID CELL COUNT: CPT

## 2024-11-26 PROCEDURE — 76376 3D RENDER W/INTRP POSTPROCES: CPT

## 2024-11-26 PROCEDURE — 82803 BLOOD GASES ANY COMBINATION: CPT

## 2024-11-26 PROCEDURE — 80202 ASSAY OF VANCOMYCIN: CPT

## 2024-11-26 PROCEDURE — 93308 TTE F-UP OR LMTD: CPT

## 2024-11-26 PROCEDURE — 84295 ASSAY OF SERUM SODIUM: CPT

## 2024-11-26 PROCEDURE — C1713: CPT

## 2024-11-26 PROCEDURE — 86901 BLOOD TYPING SEROLOGIC RH(D): CPT

## 2024-11-26 PROCEDURE — 86335 IMMUNFIX E-PHORSIS/URINE/CSF: CPT

## 2024-11-26 PROCEDURE — 87075 CULTR BACTERIA EXCEPT BLOOD: CPT

## 2024-11-26 PROCEDURE — 87040 BLOOD CULTURE FOR BACTERIA: CPT

## 2024-11-26 PROCEDURE — 49406 IMAGE CATH FLUID PERI/RETRO: CPT

## 2024-11-26 PROCEDURE — 87077 CULTURE AEROBIC IDENTIFY: CPT

## 2024-11-26 PROCEDURE — 72125 CT NECK SPINE W/O DYE: CPT | Mod: MC

## 2024-11-26 PROCEDURE — 97116 GAIT TRAINING THERAPY: CPT | Mod: GP

## 2024-11-26 PROCEDURE — A9585: CPT

## 2024-11-26 PROCEDURE — 93356 MYOCRD STRAIN IMG SPCKL TRCK: CPT

## 2024-11-26 PROCEDURE — 82330 ASSAY OF CALCIUM: CPT

## 2024-11-26 PROCEDURE — 72157 MRI CHEST SPINE W/O & W/DYE: CPT | Mod: MC

## 2024-11-26 PROCEDURE — 86140 C-REACTIVE PROTEIN: CPT

## 2024-11-26 PROCEDURE — 72070 X-RAY EXAM THORAC SPINE 2VWS: CPT

## 2024-11-26 PROCEDURE — 84443 ASSAY THYROID STIM HORMONE: CPT

## 2024-11-26 PROCEDURE — 71250 CT THORAX DX C-: CPT | Mod: MC

## 2024-11-26 PROCEDURE — 84484 ASSAY OF TROPONIN QUANT: CPT

## 2024-11-26 PROCEDURE — 85652 RBC SED RATE AUTOMATED: CPT

## 2024-11-26 PROCEDURE — 72170 X-RAY EXAM OF PELVIS: CPT

## 2024-11-26 PROCEDURE — 82728 ASSAY OF FERRITIN: CPT

## 2024-11-26 PROCEDURE — 83540 ASSAY OF IRON: CPT

## 2024-11-26 PROCEDURE — 86923 COMPATIBILITY TEST ELECTRIC: CPT

## 2024-11-26 PROCEDURE — 93321 DOPPLER ECHO F-UP/LMTD STD: CPT

## 2024-11-26 PROCEDURE — 96375 TX/PRO/DX INJ NEW DRUG ADDON: CPT

## 2024-11-26 PROCEDURE — 97530 THERAPEUTIC ACTIVITIES: CPT

## 2024-11-26 PROCEDURE — P9016: CPT

## 2024-11-26 PROCEDURE — 97166 OT EVAL MOD COMPLEX 45 MIN: CPT

## 2024-11-27 ENCOUNTER — APPOINTMENT (OUTPATIENT)
Dept: INTERNAL MEDICINE | Facility: CLINIC | Age: 74
End: 2024-11-27
Payer: MEDICARE

## 2024-11-27 ENCOUNTER — OUTPATIENT (OUTPATIENT)
Dept: OUTPATIENT SERVICES | Facility: HOSPITAL | Age: 74
LOS: 1 days | End: 2024-11-27
Payer: MEDICARE

## 2024-11-27 VITALS — HEART RATE: 80 BPM | SYSTOLIC BLOOD PRESSURE: 148 MMHG | DIASTOLIC BLOOD PRESSURE: 72 MMHG

## 2024-11-27 DIAGNOSIS — Z98.890 OTHER SPECIFIED POSTPROCEDURAL STATES: Chronic | ICD-10-CM

## 2024-11-27 DIAGNOSIS — Z98.49 CATARACT EXTRACTION STATUS, UNSPECIFIED EYE: Chronic | ICD-10-CM

## 2024-11-27 DIAGNOSIS — I10 ESSENTIAL (PRIMARY) HYPERTENSION: ICD-10-CM

## 2024-11-27 DIAGNOSIS — M96.1 POSTLAMINECTOMY SYNDROME, NOT ELSEWHERE CLASSIFIED: ICD-10-CM

## 2024-11-27 DIAGNOSIS — Z96.643 PRESENCE OF ARTIFICIAL HIP JOINT, BILATERAL: Chronic | ICD-10-CM

## 2024-11-27 PROCEDURE — G0463: CPT

## 2024-11-27 PROCEDURE — 99214 OFFICE O/P EST MOD 30 MIN: CPT

## 2024-11-27 RX ORDER — HYDROMORPHONE HYDROCHLORIDE 4 MG/1
4 TABLET ORAL
Qty: 40 | Refills: 0 | Status: ACTIVE | COMMUNITY
Start: 2024-11-27 | End: 1900-01-01

## 2024-12-03 DIAGNOSIS — M96.1 POSTLAMINECTOMY SYNDROME, NOT ELSEWHERE CLASSIFIED: ICD-10-CM

## 2024-12-04 RX ORDER — CYCLOBENZAPRINE HYDROCHLORIDE 10 MG/1
10 TABLET, FILM COATED ORAL
Qty: 90 | Refills: 3 | Status: ACTIVE | COMMUNITY
Start: 2024-12-04 | End: 1900-01-01

## 2024-12-04 RX ORDER — PREGABALIN 100 MG/1
100 CAPSULE ORAL 3 TIMES DAILY
Qty: 90 | Refills: 0 | Status: ACTIVE | COMMUNITY
Start: 2024-12-04 | End: 1900-01-01

## 2024-12-05 ENCOUNTER — OUTPATIENT (OUTPATIENT)
Dept: OUTPATIENT SERVICES | Facility: HOSPITAL | Age: 74
LOS: 1 days | End: 2024-12-05
Payer: MEDICARE

## 2024-12-05 ENCOUNTER — APPOINTMENT (OUTPATIENT)
Dept: BARIATRICS/WEIGHT MGMT | Facility: CLINIC | Age: 74
End: 2024-12-05
Payer: MEDICARE

## 2024-12-05 VITALS
OXYGEN SATURATION: 96 % | DIASTOLIC BLOOD PRESSURE: 70 MMHG | SYSTOLIC BLOOD PRESSURE: 130 MMHG | BODY MASS INDEX: 33.12 KG/M2 | HEART RATE: 67 BPM | HEIGHT: 64 IN | WEIGHT: 194 LBS

## 2024-12-05 DIAGNOSIS — Z98.890 OTHER SPECIFIED POSTPROCEDURAL STATES: Chronic | ICD-10-CM

## 2024-12-05 DIAGNOSIS — E78.5 HYPERLIPIDEMIA, UNSPECIFIED: ICD-10-CM

## 2024-12-05 DIAGNOSIS — Z98.49 CATARACT EXTRACTION STATUS, UNSPECIFIED EYE: Chronic | ICD-10-CM

## 2024-12-05 DIAGNOSIS — E66.9 OBESITY, UNSPECIFIED: ICD-10-CM

## 2024-12-05 DIAGNOSIS — R73.03 PREDIABETES.: ICD-10-CM

## 2024-12-05 DIAGNOSIS — I10 ESSENTIAL (PRIMARY) HYPERTENSION: ICD-10-CM

## 2024-12-05 DIAGNOSIS — Z96.643 PRESENCE OF ARTIFICIAL HIP JOINT, BILATERAL: Chronic | ICD-10-CM

## 2024-12-05 DIAGNOSIS — G47.33 OBSTRUCTIVE SLEEP APNEA (ADULT) (PEDIATRIC): ICD-10-CM

## 2024-12-05 PROBLEM — Z98.1 S/P FUSION OF THORACIC SPINE: Status: ACTIVE | Noted: 2024-12-05

## 2024-12-05 PROBLEM — Z98.1 S/P LUMBAR FUSION: Status: ACTIVE | Noted: 2024-12-05

## 2024-12-05 PROCEDURE — G2211 COMPLEX E/M VISIT ADD ON: CPT

## 2024-12-05 PROCEDURE — G0463: CPT

## 2024-12-05 PROCEDURE — 99215 OFFICE O/P EST HI 40 MIN: CPT

## 2024-12-05 RX ORDER — TIRZEPATIDE 2.5 MG/.5ML
2.5 INJECTION, SOLUTION SUBCUTANEOUS
Qty: 1 | Refills: 0 | Status: ACTIVE | COMMUNITY
Start: 2024-12-05 | End: 1900-01-01

## 2024-12-09 DIAGNOSIS — R73.03 PREDIABETES: ICD-10-CM

## 2024-12-09 DIAGNOSIS — E78.5 HYPERLIPIDEMIA, UNSPECIFIED: ICD-10-CM

## 2024-12-09 DIAGNOSIS — G47.33 OBSTRUCTIVE SLEEP APNEA (ADULT) (PEDIATRIC): ICD-10-CM

## 2024-12-09 DIAGNOSIS — E66.9 OBESITY, UNSPECIFIED: ICD-10-CM

## 2024-12-11 ENCOUNTER — APPOINTMENT (OUTPATIENT)
Dept: INFECTIOUS DISEASE | Facility: CLINIC | Age: 74
End: 2024-12-11
Payer: MEDICARE

## 2024-12-11 VITALS
SYSTOLIC BLOOD PRESSURE: 130 MMHG | HEART RATE: 80 BPM | TEMPERATURE: 98.2 F | DIASTOLIC BLOOD PRESSURE: 72 MMHG | WEIGHT: 190 LBS | OXYGEN SATURATION: 95 % | HEIGHT: 64 IN | BODY MASS INDEX: 32.44 KG/M2

## 2024-12-11 DIAGNOSIS — A41.01 SEPSIS DUE TO METHICILLIN SUSCEPTIBLE STAPHYLOCOCCUS AUREUS: ICD-10-CM

## 2024-12-11 DIAGNOSIS — T81.42XA INFECTION FOLLOWING A PROCEDURE,DEEP INCISIONAL SURGICAL SITE,INITIAL ENC: ICD-10-CM

## 2024-12-11 DIAGNOSIS — T81.42XS: ICD-10-CM

## 2024-12-11 PROCEDURE — 99214 OFFICE O/P EST MOD 30 MIN: CPT

## 2024-12-11 RX ORDER — CEFADROXIL 500 MG/1
500 CAPSULE ORAL TWICE DAILY
Qty: 180 | Refills: 0 | Status: ACTIVE | COMMUNITY
Start: 2024-12-11 | End: 1900-01-01

## 2024-12-12 ENCOUNTER — APPOINTMENT (OUTPATIENT)
Dept: NEUROSURGERY | Facility: CLINIC | Age: 74
End: 2024-12-12
Payer: MEDICARE

## 2024-12-12 VITALS
BODY MASS INDEX: 32.44 KG/M2 | OXYGEN SATURATION: 97 % | WEIGHT: 190 LBS | DIASTOLIC BLOOD PRESSURE: 81 MMHG | SYSTOLIC BLOOD PRESSURE: 144 MMHG | HEART RATE: 71 BPM | TEMPERATURE: 98.1 F | HEIGHT: 64 IN

## 2024-12-12 DIAGNOSIS — Z98.1 ARTHRODESIS STATUS: ICD-10-CM

## 2024-12-12 PROCEDURE — 99214 OFFICE O/P EST MOD 30 MIN: CPT | Mod: 24

## 2024-12-17 DIAGNOSIS — E11.9 TYPE 2 DIABETES MELLITUS W/OUT COMPLICATIONS: ICD-10-CM

## 2024-12-17 RX ORDER — TIRZEPATIDE 2.5 MG/.5ML
2.5 INJECTION, SOLUTION SUBCUTANEOUS
Qty: 1 | Refills: 0 | Status: ACTIVE | COMMUNITY
Start: 2024-12-17 | End: 1900-01-01

## 2024-12-20 ENCOUNTER — TRANSCRIPTION ENCOUNTER (OUTPATIENT)
Age: 74
End: 2024-12-20

## 2024-12-21 ENCOUNTER — APPOINTMENT (OUTPATIENT)
Dept: RADIOLOGY | Facility: CLINIC | Age: 74
End: 2024-12-21

## 2024-12-21 PROCEDURE — 72082 X-RAY EXAM ENTIRE SPI 2/3 VW: CPT

## 2024-12-22 PROCEDURE — 97165 OT EVAL LOW COMPLEX 30 MIN: CPT | Mod: GO

## 2024-12-22 PROCEDURE — 72130 CT CHEST SPINE W/O & W/DYE: CPT | Mod: MC

## 2024-12-22 PROCEDURE — 97161 PT EVAL LOW COMPLEX 20 MIN: CPT | Mod: GP

## 2024-12-22 PROCEDURE — 93005 ELECTROCARDIOGRAM TRACING: CPT

## 2024-12-22 PROCEDURE — 80053 COMPREHEN METABOLIC PANEL: CPT

## 2024-12-22 PROCEDURE — 71100 X-RAY EXAM RIBS UNI 2 VIEWS: CPT

## 2024-12-22 PROCEDURE — 71045 X-RAY EXAM CHEST 1 VIEW: CPT

## 2024-12-22 PROCEDURE — 97535 SELF CARE MNGMENT TRAINING: CPT | Mod: GO

## 2024-12-22 PROCEDURE — 86140 C-REACTIVE PROTEIN: CPT

## 2024-12-22 PROCEDURE — 85652 RBC SED RATE AUTOMATED: CPT

## 2024-12-22 PROCEDURE — 97542 WHEELCHAIR MNGMENT TRAINING: CPT | Mod: GO

## 2024-12-22 PROCEDURE — 97116 GAIT TRAINING THERAPY: CPT | Mod: GP

## 2024-12-22 PROCEDURE — 87635 SARS-COV-2 COVID-19 AMP PRB: CPT

## 2024-12-22 PROCEDURE — 85025 COMPLETE CBC W/AUTO DIFF WBC: CPT

## 2024-12-22 PROCEDURE — 81001 URINALYSIS AUTO W/SCOPE: CPT

## 2024-12-22 PROCEDURE — 36415 COLL VENOUS BLD VENIPUNCTURE: CPT

## 2024-12-22 PROCEDURE — 97530 THERAPEUTIC ACTIVITIES: CPT | Mod: GO

## 2024-12-22 PROCEDURE — 72133 CT LUMBAR SPINE W/O & W/DYE: CPT | Mod: MC

## 2024-12-22 PROCEDURE — 97112 NEUROMUSCULAR REEDUCATION: CPT | Mod: GP

## 2024-12-22 PROCEDURE — 97110 THERAPEUTIC EXERCISES: CPT | Mod: GP

## 2024-12-23 ENCOUNTER — NON-APPOINTMENT (OUTPATIENT)
Age: 74
End: 2024-12-23

## 2024-12-23 ENCOUNTER — APPOINTMENT (OUTPATIENT)
Dept: PHYSICAL MEDICINE AND REHAB | Facility: CLINIC | Age: 74
End: 2024-12-23
Payer: MEDICARE

## 2024-12-23 VITALS
BODY MASS INDEX: 34.49 KG/M2 | HEART RATE: 97 BPM | WEIGHT: 202 LBS | TEMPERATURE: 97.8 F | HEIGHT: 64 IN | SYSTOLIC BLOOD PRESSURE: 154 MMHG | DIASTOLIC BLOOD PRESSURE: 75 MMHG | OXYGEN SATURATION: 96 %

## 2024-12-23 DIAGNOSIS — G25.81 RESTLESS LEGS SYNDROME: ICD-10-CM

## 2024-12-23 DIAGNOSIS — Z98.1 ARTHRODESIS STATUS: ICD-10-CM

## 2024-12-23 DIAGNOSIS — S22.009A UNSPECIFIED FRACTURE OF UNSPECIFIED THORACIC VERTEBRA, INITIAL ENCOUNTER FOR CLOSED FRACTURE: ICD-10-CM

## 2024-12-23 DIAGNOSIS — A49.01 METHICILLIN SUSCEPTIBLE STAPHYLOCOCCUS AUREUS INFECTION, UNSPECIFIED SITE: ICD-10-CM

## 2024-12-23 DIAGNOSIS — M46.20 OSTEOMYELITIS OF VERTEBRA, SITE UNSPECIFIED: ICD-10-CM

## 2024-12-23 PROCEDURE — 99213 OFFICE O/P EST LOW 20 MIN: CPT

## 2024-12-27 ENCOUNTER — TRANSCRIPTION ENCOUNTER (OUTPATIENT)
Age: 74
End: 2024-12-27

## 2024-12-31 ENCOUNTER — TRANSCRIPTION ENCOUNTER (OUTPATIENT)
Age: 74
End: 2024-12-31

## 2024-12-31 ENCOUNTER — NON-APPOINTMENT (OUTPATIENT)
Age: 74
End: 2024-12-31

## 2024-12-31 ENCOUNTER — APPOINTMENT (OUTPATIENT)
Dept: CARDIOLOGY | Facility: CLINIC | Age: 74
End: 2024-12-31
Payer: MEDICARE

## 2024-12-31 VITALS
BODY MASS INDEX: 33.8 KG/M2 | SYSTOLIC BLOOD PRESSURE: 90 MMHG | HEART RATE: 106 BPM | WEIGHT: 198 LBS | HEIGHT: 64 IN | DIASTOLIC BLOOD PRESSURE: 50 MMHG

## 2024-12-31 VITALS — DIASTOLIC BLOOD PRESSURE: 48 MMHG | SYSTOLIC BLOOD PRESSURE: 92 MMHG

## 2024-12-31 DIAGNOSIS — R94.31 ABNORMAL ELECTROCARDIOGRAM [ECG] [EKG]: ICD-10-CM

## 2024-12-31 DIAGNOSIS — R07.89 OTHER CHEST PAIN: ICD-10-CM

## 2024-12-31 DIAGNOSIS — I48.0 PAROXYSMAL ATRIAL FIBRILLATION: ICD-10-CM

## 2024-12-31 DIAGNOSIS — E78.5 HYPERLIPIDEMIA, UNSPECIFIED: ICD-10-CM

## 2024-12-31 PROCEDURE — 36415 COLL VENOUS BLD VENIPUNCTURE: CPT

## 2024-12-31 PROCEDURE — 99215 OFFICE O/P EST HI 40 MIN: CPT

## 2024-12-31 PROCEDURE — 93000 ELECTROCARDIOGRAM COMPLETE: CPT

## 2024-12-31 RX ORDER — APIXABAN 5 MG/1
5 TABLET, FILM COATED ORAL
Qty: 180 | Refills: 3 | Status: ACTIVE | COMMUNITY
Start: 2024-12-31 | End: 1900-01-01

## 2025-01-01 LAB
ALBUMIN SERPL ELPH-MCNC: 4.5 G/DL
ALP BLD-CCNC: 166 U/L
ALT SERPL-CCNC: 20 U/L
ANION GAP SERPL CALC-SCNC: 14 MMOL/L
AST SERPL-CCNC: 23 U/L
BASOPHILS # BLD AUTO: 0.04 K/UL
BASOPHILS NFR BLD AUTO: 0.8 %
BILIRUB SERPL-MCNC: 0.3 MG/DL
BUN SERPL-MCNC: 29 MG/DL
CALCIUM SERPL-MCNC: 10.3 MG/DL
CHLORIDE SERPL-SCNC: 103 MMOL/L
CO2 SERPL-SCNC: 24 MMOL/L
CREAT SERPL-MCNC: 0.84 MG/DL
CRP SERPL HS-MCNC: 3.07 MG/L
EGFR: 72 ML/MIN/1.73M2
EOSINOPHIL # BLD AUTO: 0.07 K/UL
EOSINOPHIL NFR BLD AUTO: 1.4 %
ESTIMATED AVERAGE GLUCOSE: 117 MG/DL
GLUCOSE SERPL-MCNC: 106 MG/DL
HBA1C MFR BLD HPLC: 5.7 %
HCT VFR BLD CALC: 38.2 %
HGB BLD-MCNC: 11.3 G/DL
IMM GRANULOCYTES NFR BLD AUTO: 0 %
LYMPHOCYTES # BLD AUTO: 1.15 K/UL
LYMPHOCYTES NFR BLD AUTO: 22.2 %
MAN DIFF?: NORMAL
MCHC RBC-ENTMCNC: 26 PG
MCHC RBC-ENTMCNC: 29.6 G/DL
MCV RBC AUTO: 87.8 FL
MONOCYTES # BLD AUTO: 0.31 K/UL
MONOCYTES NFR BLD AUTO: 6 %
NEUTROPHILS # BLD AUTO: 3.6 K/UL
NEUTROPHILS NFR BLD AUTO: 69.6 %
PLATELET # BLD AUTO: 302 K/UL
POTASSIUM SERPL-SCNC: 4.1 MMOL/L
PROT SERPL-MCNC: 7.1 G/DL
RBC # BLD: 4.35 M/UL
RBC # FLD: 17.3 %
SODIUM SERPL-SCNC: 141 MMOL/L
TSH SERPL-ACNC: 1.48 UIU/ML
WBC # FLD AUTO: 5.17 K/UL

## 2025-01-03 ENCOUNTER — NON-APPOINTMENT (OUTPATIENT)
Age: 75
End: 2025-01-03

## 2025-01-04 NOTE — DISCHARGE NOTE NURSING/CASE MANAGEMENT/SOCIAL WORK - NSTRANSFERBELONGINGSRESP_GEN_A_NUR
Pt arrived with complaint of \"need new garcia bag\",  pt reports his garcia is leaking and no urine is getting in the bag.  Pt is awake alert and oriented X4, pt educated on ER flow   yes

## 2025-01-06 ENCOUNTER — APPOINTMENT (OUTPATIENT)
Dept: GASTROENTEROLOGY | Facility: CLINIC | Age: 75
End: 2025-01-06
Payer: MEDICARE

## 2025-01-06 VITALS
DIASTOLIC BLOOD PRESSURE: 79 MMHG | SYSTOLIC BLOOD PRESSURE: 141 MMHG | HEART RATE: 69 BPM | OXYGEN SATURATION: 99 % | WEIGHT: 198 LBS | BODY MASS INDEX: 33.8 KG/M2 | HEIGHT: 64 IN

## 2025-01-06 DIAGNOSIS — K59.09 OTHER CONSTIPATION: ICD-10-CM

## 2025-01-06 DIAGNOSIS — K31.84 GASTROPARESIS: ICD-10-CM

## 2025-01-06 DIAGNOSIS — K21.9 GASTRO-ESOPHAGEAL REFLUX DISEASE W/OUT ESOPHAGITIS: ICD-10-CM

## 2025-01-06 PROCEDURE — G2211 COMPLEX E/M VISIT ADD ON: CPT

## 2025-01-06 PROCEDURE — 99215 OFFICE O/P EST HI 40 MIN: CPT

## 2025-01-12 ENCOUNTER — NON-APPOINTMENT (OUTPATIENT)
Age: 75
End: 2025-01-12

## 2025-01-13 ENCOUNTER — TRANSCRIPTION ENCOUNTER (OUTPATIENT)
Age: 75
End: 2025-01-13

## 2025-01-21 ENCOUNTER — RX RENEWAL (OUTPATIENT)
Age: 75
End: 2025-01-21

## 2025-01-22 ENCOUNTER — APPOINTMENT (OUTPATIENT)
Dept: INFECTIOUS DISEASE | Facility: CLINIC | Age: 75
End: 2025-01-22
Payer: MEDICARE

## 2025-01-22 VITALS
HEIGHT: 64 IN | HEART RATE: 74 BPM | SYSTOLIC BLOOD PRESSURE: 134 MMHG | TEMPERATURE: 98.2 F | BODY MASS INDEX: 33.8 KG/M2 | WEIGHT: 198 LBS | OXYGEN SATURATION: 97 % | DIASTOLIC BLOOD PRESSURE: 86 MMHG

## 2025-01-22 DIAGNOSIS — A41.01 SEPSIS DUE TO METHICILLIN SUSCEPTIBLE STAPHYLOCOCCUS AUREUS: ICD-10-CM

## 2025-01-22 PROCEDURE — 99214 OFFICE O/P EST MOD 30 MIN: CPT

## 2025-01-23 ENCOUNTER — APPOINTMENT (OUTPATIENT)
Dept: BARIATRICS/WEIGHT MGMT | Facility: CLINIC | Age: 75
End: 2025-01-23
Payer: MEDICARE

## 2025-01-23 ENCOUNTER — OUTPATIENT (OUTPATIENT)
Dept: OUTPATIENT SERVICES | Facility: HOSPITAL | Age: 75
LOS: 1 days | End: 2025-01-23
Payer: MEDICARE

## 2025-01-23 VITALS
BODY MASS INDEX: 33.94 KG/M2 | DIASTOLIC BLOOD PRESSURE: 82 MMHG | HEIGHT: 64 IN | SYSTOLIC BLOOD PRESSURE: 142 MMHG | WEIGHT: 198.8 LBS

## 2025-01-23 DIAGNOSIS — I10 ESSENTIAL (PRIMARY) HYPERTENSION: ICD-10-CM

## 2025-01-23 DIAGNOSIS — E66.9 OBESITY, UNSPECIFIED: ICD-10-CM

## 2025-01-23 DIAGNOSIS — E78.5 HYPERLIPIDEMIA, UNSPECIFIED: ICD-10-CM

## 2025-01-23 DIAGNOSIS — Z98.49 CATARACT EXTRACTION STATUS, UNSPECIFIED EYE: Chronic | ICD-10-CM

## 2025-01-23 DIAGNOSIS — Z98.890 OTHER SPECIFIED POSTPROCEDURAL STATES: Chronic | ICD-10-CM

## 2025-01-23 DIAGNOSIS — G47.33 OBSTRUCTIVE SLEEP APNEA (ADULT) (PEDIATRIC): ICD-10-CM

## 2025-01-23 DIAGNOSIS — Z96.643 PRESENCE OF ARTIFICIAL HIP JOINT, BILATERAL: Chronic | ICD-10-CM

## 2025-01-23 DIAGNOSIS — E11.9 TYPE 2 DIABETES MELLITUS W/OUT COMPLICATIONS: ICD-10-CM

## 2025-01-23 PROCEDURE — G0463: CPT

## 2025-01-23 PROCEDURE — 99214 OFFICE O/P EST MOD 30 MIN: CPT

## 2025-01-23 RX ORDER — TIRZEPATIDE 7.5 MG/.5ML
7.5 INJECTION, SOLUTION SUBCUTANEOUS
Qty: 1 | Refills: 5 | Status: ACTIVE | COMMUNITY
Start: 2025-01-23 | End: 1900-01-01

## 2025-01-27 DIAGNOSIS — E66.9 OBESITY, UNSPECIFIED: ICD-10-CM

## 2025-01-27 DIAGNOSIS — G47.33 OBSTRUCTIVE SLEEP APNEA (ADULT) (PEDIATRIC): ICD-10-CM

## 2025-01-27 DIAGNOSIS — E78.5 HYPERLIPIDEMIA, UNSPECIFIED: ICD-10-CM

## 2025-01-27 DIAGNOSIS — E11.9 TYPE 2 DIABETES MELLITUS WITHOUT COMPLICATIONS: ICD-10-CM

## 2025-02-04 ENCOUNTER — NON-APPOINTMENT (OUTPATIENT)
Age: 75
End: 2025-02-04

## 2025-02-04 ENCOUNTER — APPOINTMENT (OUTPATIENT)
Dept: CARDIOLOGY | Facility: CLINIC | Age: 75
End: 2025-02-04
Payer: MEDICARE

## 2025-02-04 VITALS
OXYGEN SATURATION: 97 % | HEART RATE: 96 BPM | SYSTOLIC BLOOD PRESSURE: 118 MMHG | WEIGHT: 2 LBS | BODY MASS INDEX: 0.34 KG/M2 | DIASTOLIC BLOOD PRESSURE: 72 MMHG

## 2025-02-04 DIAGNOSIS — I48.0 PAROXYSMAL ATRIAL FIBRILLATION: ICD-10-CM

## 2025-02-04 DIAGNOSIS — Q21.12 PATENT FORAMEN OVALE: ICD-10-CM

## 2025-02-04 DIAGNOSIS — I10 ESSENTIAL (PRIMARY) HYPERTENSION: ICD-10-CM

## 2025-02-04 PROCEDURE — 99214 OFFICE O/P EST MOD 30 MIN: CPT

## 2025-02-04 PROCEDURE — 93000 ELECTROCARDIOGRAM COMPLETE: CPT

## 2025-02-06 ENCOUNTER — RX RENEWAL (OUTPATIENT)
Age: 75
End: 2025-02-06

## 2025-02-10 ENCOUNTER — RX RENEWAL (OUTPATIENT)
Age: 75
End: 2025-02-10

## 2025-02-14 ENCOUNTER — RX RENEWAL (OUTPATIENT)
Age: 75
End: 2025-02-14

## 2025-02-17 ENCOUNTER — RX RENEWAL (OUTPATIENT)
Age: 75
End: 2025-02-17

## 2025-02-18 ENCOUNTER — TRANSCRIPTION ENCOUNTER (OUTPATIENT)
Age: 75
End: 2025-02-18

## 2025-02-21 ENCOUNTER — RX RENEWAL (OUTPATIENT)
Age: 75
End: 2025-02-21

## 2025-02-21 RX ORDER — METOPROLOL SUCCINATE 25 MG/1
25 TABLET, EXTENDED RELEASE ORAL
Qty: 30 | Refills: 5 | Status: ACTIVE | COMMUNITY
Start: 2025-02-21 | End: 1900-01-01

## 2025-02-25 ENCOUNTER — APPOINTMENT (OUTPATIENT)
Dept: ORTHOPEDIC SURGERY | Facility: CLINIC | Age: 75
End: 2025-02-25

## 2025-02-25 DIAGNOSIS — M18.12 UNILATERAL PRIMARY OSTEOARTHRITIS OF FIRST CARPOMETACARPAL JOINT, LEFT HAND: ICD-10-CM

## 2025-02-25 PROCEDURE — 20605 DRAIN/INJ JOINT/BURSA W/O US: CPT | Mod: LT

## 2025-02-25 PROCEDURE — 99214 OFFICE O/P EST MOD 30 MIN: CPT | Mod: 25

## 2025-02-26 ENCOUNTER — RX RENEWAL (OUTPATIENT)
Age: 75
End: 2025-02-26

## 2025-03-10 ENCOUNTER — APPOINTMENT (OUTPATIENT)
Dept: ENDOCRINOLOGY | Facility: CLINIC | Age: 75
End: 2025-03-10
Payer: MEDICARE

## 2025-03-10 VITALS
WEIGHT: 200 LBS | RESPIRATION RATE: 16 BRPM | OXYGEN SATURATION: 99 % | HEART RATE: 100 BPM | HEIGHT: 64 IN | DIASTOLIC BLOOD PRESSURE: 60 MMHG | BODY MASS INDEX: 34.15 KG/M2 | SYSTOLIC BLOOD PRESSURE: 145 MMHG

## 2025-03-10 DIAGNOSIS — M81.0 AGE-RELATED OSTEOPOROSIS W/OUT CURRENT PATHOLOGICAL FRACTURE: ICD-10-CM

## 2025-03-10 DIAGNOSIS — E04.2 NONTOXIC MULTINODULAR GOITER: ICD-10-CM

## 2025-03-10 DIAGNOSIS — E78.5 HYPERLIPIDEMIA, UNSPECIFIED: ICD-10-CM

## 2025-03-10 DIAGNOSIS — R73.03 PREDIABETES.: ICD-10-CM

## 2025-03-10 DIAGNOSIS — I10 ESSENTIAL (PRIMARY) HYPERTENSION: ICD-10-CM

## 2025-03-10 PROCEDURE — 96372 THER/PROPH/DIAG INJ SC/IM: CPT

## 2025-03-10 PROCEDURE — 99214 OFFICE O/P EST MOD 30 MIN: CPT | Mod: 25

## 2025-03-10 RX ORDER — DENOSUMAB 60 MG/ML
60 INJECTION SUBCUTANEOUS
Qty: 1 | Refills: 0 | Status: COMPLETED | OUTPATIENT
Start: 2025-03-10

## 2025-03-10 RX ADMIN — DENOSUMAB 0 MG/ML: 60 INJECTION SUBCUTANEOUS at 00:00

## 2025-03-11 ENCOUNTER — APPOINTMENT (OUTPATIENT)
Dept: ORTHOPEDIC SURGERY | Facility: CLINIC | Age: 75
End: 2025-03-11

## 2025-03-11 VITALS — BODY MASS INDEX: 34.15 KG/M2 | HEIGHT: 64 IN | WEIGHT: 200 LBS

## 2025-03-11 PROCEDURE — 99213 OFFICE O/P EST LOW 20 MIN: CPT

## 2025-03-13 NOTE — DISCHARGE NOTE NURSING/CASE MANAGEMENT/SOCIAL WORK - NSPROMEDSRETURNEDYESNO_GEN_A_NUR
[Normal Appearance] : normal appearance [Well Groomed] : well groomed [General Appearance - In No Acute Distress] : no acute distress home med [] : no respiratory distress [Respiration, Rhythm And Depth] : normal respiratory rhythm and effort [Exaggerated Use Of Accessory Muscles For Inspiration] : no accessory muscle use [Abdomen Soft] : soft [Abdomen Tenderness] : non-tender [Urinary Bladder Findings] : the bladder was normal on palpation [Costovertebral Angle Tenderness] : no ~M costovertebral angle tenderness [Normal Station and Gait] : the gait and station were normal for the patient's age [No Focal Deficits] : no focal deficits [Oriented To Time, Place, And Person] : oriented to person, place, and time [Affect] : the affect was normal [Mood] : the mood was normal

## 2025-03-17 ENCOUNTER — APPOINTMENT (OUTPATIENT)
Dept: BARIATRICS/WEIGHT MGMT | Facility: CLINIC | Age: 75
End: 2025-03-17
Payer: MEDICARE

## 2025-03-17 DIAGNOSIS — E66.9 OBESITY, UNSPECIFIED: ICD-10-CM

## 2025-03-17 PROCEDURE — 97802 MEDICAL NUTRITION INDIV IN: CPT | Mod: GA,2W

## 2025-03-24 ENCOUNTER — APPOINTMENT (OUTPATIENT)
Dept: PHYSICAL MEDICINE AND REHAB | Facility: CLINIC | Age: 75
End: 2025-03-24
Payer: MEDICARE

## 2025-03-24 VITALS
HEIGHT: 64 IN | WEIGHT: 195 LBS | SYSTOLIC BLOOD PRESSURE: 133 MMHG | OXYGEN SATURATION: 99 % | HEART RATE: 90 BPM | BODY MASS INDEX: 33.29 KG/M2 | TEMPERATURE: 97.3 F | DIASTOLIC BLOOD PRESSURE: 83 MMHG

## 2025-03-24 DIAGNOSIS — N39.41 URGE INCONTINENCE: ICD-10-CM

## 2025-03-24 DIAGNOSIS — Z98.1 ARTHRODESIS STATUS: ICD-10-CM

## 2025-03-24 DIAGNOSIS — S22.009A UNSPECIFIED FRACTURE OF UNSPECIFIED THORACIC VERTEBRA, INITIAL ENCOUNTER FOR CLOSED FRACTURE: ICD-10-CM

## 2025-03-24 DIAGNOSIS — M54.16 RADICULOPATHY, LUMBAR REGION: ICD-10-CM

## 2025-03-24 PROCEDURE — 99213 OFFICE O/P EST LOW 20 MIN: CPT

## 2025-03-26 ENCOUNTER — APPOINTMENT (OUTPATIENT)
Dept: INFECTIOUS DISEASE | Facility: CLINIC | Age: 75
End: 2025-03-26
Payer: MEDICARE

## 2025-03-26 VITALS
OXYGEN SATURATION: 97 % | HEIGHT: 64 IN | DIASTOLIC BLOOD PRESSURE: 72 MMHG | TEMPERATURE: 97.4 F | HEART RATE: 80 BPM | SYSTOLIC BLOOD PRESSURE: 128 MMHG

## 2025-03-26 DIAGNOSIS — T81.42XA INFECTION FOLLOWING A PROCEDURE,DEEP INCISIONAL SURGICAL SITE,INITIAL ENC: ICD-10-CM

## 2025-03-26 DIAGNOSIS — A49.01 METHICILLIN SUSCEPTIBLE STAPHYLOCOCCUS AUREUS INFECTION, UNSPECIFIED SITE: ICD-10-CM

## 2025-03-26 PROCEDURE — 99213 OFFICE O/P EST LOW 20 MIN: CPT

## 2025-03-26 RX ORDER — CEFADROXIL 500 MG/1
500 CAPSULE ORAL TWICE DAILY
Qty: 180 | Refills: 2 | Status: ACTIVE | COMMUNITY
Start: 2025-03-26 | End: 1900-01-01

## 2025-03-28 LAB
ANION GAP SERPL CALC-SCNC: 13 MMOL/L
BASOPHILS # BLD AUTO: 0.04 K/UL
BASOPHILS NFR BLD AUTO: 0.9 %
BUN SERPL-MCNC: 32 MG/DL
CALCIUM SERPL-MCNC: 9.7 MG/DL
CHLORIDE SERPL-SCNC: 102 MMOL/L
CO2 SERPL-SCNC: 28 MMOL/L
CREAT SERPL-MCNC: 0.9 MG/DL
CRP SERPL-MCNC: 8 MG/L
EGFRCR SERPLBLD CKD-EPI 2021: 66 ML/MIN/1.73M2
EOSINOPHIL # BLD AUTO: 0.08 K/UL
EOSINOPHIL NFR BLD AUTO: 1.7 %
ERYTHROCYTE [SEDIMENTATION RATE] IN BLOOD BY WESTERGREN METHOD: 8 MM/HR
FERRITIN SERPL-MCNC: 61 NG/ML
GLUCOSE SERPL-MCNC: 86 MG/DL
HCT VFR BLD CALC: 42 %
HGB BLD-MCNC: 13.3 G/DL
IMM GRANULOCYTES NFR BLD AUTO: 0.2 %
LYMPHOCYTES # BLD AUTO: 1.12 K/UL
LYMPHOCYTES NFR BLD AUTO: 24.1 %
MAN DIFF?: NORMAL
MCHC RBC-ENTMCNC: 27 PG
MCHC RBC-ENTMCNC: 31.7 G/DL
MCV RBC AUTO: 85.4 FL
MONOCYTES # BLD AUTO: 0.38 K/UL
MONOCYTES NFR BLD AUTO: 8.2 %
NEUTROPHILS # BLD AUTO: 3.01 K/UL
NEUTROPHILS NFR BLD AUTO: 64.9 %
PLATELET # BLD AUTO: 251 K/UL
POTASSIUM SERPL-SCNC: 4.2 MMOL/L
RBC # BLD: 4.92 M/UL
RBC # FLD: 16.4 %
SODIUM SERPL-SCNC: 142 MMOL/L
WBC # FLD AUTO: 4.64 K/UL

## 2025-04-04 ENCOUNTER — APPOINTMENT (OUTPATIENT)
Dept: NEUROSURGERY | Facility: CLINIC | Age: 75
End: 2025-04-04
Payer: MEDICARE

## 2025-04-04 ENCOUNTER — RESULT REVIEW (OUTPATIENT)
Age: 75
End: 2025-04-04

## 2025-04-04 ENCOUNTER — RX RENEWAL (OUTPATIENT)
Age: 75
End: 2025-04-04

## 2025-04-04 PROCEDURE — 99214 OFFICE O/P EST MOD 30 MIN: CPT

## 2025-04-04 RX ORDER — IBUPROFEN 800 MG/1
800 TABLET, FILM COATED ORAL 3 TIMES DAILY
Qty: 90 | Refills: 3 | Status: ACTIVE | COMMUNITY
Start: 2025-04-04 | End: 1900-01-01

## 2025-04-05 ENCOUNTER — APPOINTMENT (OUTPATIENT)
Dept: RADIOLOGY | Facility: CLINIC | Age: 75
End: 2025-04-05
Payer: MEDICARE

## 2025-04-05 ENCOUNTER — EMERGENCY (EMERGENCY)
Facility: HOSPITAL | Age: 75
LOS: 1 days | End: 2025-04-05
Attending: EMERGENCY MEDICINE
Payer: MEDICARE

## 2025-04-05 VITALS
OXYGEN SATURATION: 98 % | HEART RATE: 74 BPM | RESPIRATION RATE: 18 BRPM | TEMPERATURE: 98 F | HEIGHT: 64 IN | DIASTOLIC BLOOD PRESSURE: 76 MMHG | WEIGHT: 188.05 LBS | SYSTOLIC BLOOD PRESSURE: 120 MMHG

## 2025-04-05 DIAGNOSIS — Z96.643 PRESENCE OF ARTIFICIAL HIP JOINT, BILATERAL: Chronic | ICD-10-CM

## 2025-04-05 DIAGNOSIS — Z98.890 OTHER SPECIFIED POSTPROCEDURAL STATES: Chronic | ICD-10-CM

## 2025-04-05 DIAGNOSIS — Z98.49 CATARACT EXTRACTION STATUS, UNSPECIFIED EYE: Chronic | ICD-10-CM

## 2025-04-05 LAB
ALBUMIN SERPL ELPH-MCNC: 4 G/DL — SIGNIFICANT CHANGE UP (ref 3.3–5)
ALP SERPL-CCNC: 184 U/L — HIGH (ref 40–120)
ALT FLD-CCNC: 26 U/L — SIGNIFICANT CHANGE UP (ref 10–45)
ANION GAP SERPL CALC-SCNC: 14 MMOL/L — SIGNIFICANT CHANGE UP (ref 5–17)
AST SERPL-CCNC: 31 U/L — SIGNIFICANT CHANGE UP (ref 10–40)
BASOPHILS # BLD AUTO: 0.03 K/UL — SIGNIFICANT CHANGE UP (ref 0–0.2)
BASOPHILS NFR BLD AUTO: 0.6 % — SIGNIFICANT CHANGE UP (ref 0–2)
BILIRUB SERPL-MCNC: 0.4 MG/DL — SIGNIFICANT CHANGE UP (ref 0.2–1.2)
BLD GP AB SCN SERPL QL: NEGATIVE — SIGNIFICANT CHANGE UP
BUN SERPL-MCNC: 22 MG/DL — SIGNIFICANT CHANGE UP (ref 7–23)
CALCIUM SERPL-MCNC: 9.4 MG/DL — SIGNIFICANT CHANGE UP (ref 8.4–10.5)
CHLORIDE SERPL-SCNC: 98 MMOL/L — SIGNIFICANT CHANGE UP (ref 96–108)
CO2 SERPL-SCNC: 25 MMOL/L — SIGNIFICANT CHANGE UP (ref 22–31)
CREAT SERPL-MCNC: 0.67 MG/DL — SIGNIFICANT CHANGE UP (ref 0.5–1.3)
EGFR: 90 ML/MIN/1.73M2 — SIGNIFICANT CHANGE UP
EGFR: 90 ML/MIN/1.73M2 — SIGNIFICANT CHANGE UP
EOSINOPHIL # BLD AUTO: 0.08 K/UL — SIGNIFICANT CHANGE UP (ref 0–0.5)
EOSINOPHIL NFR BLD AUTO: 1.7 % — SIGNIFICANT CHANGE UP (ref 0–6)
GLUCOSE SERPL-MCNC: 86 MG/DL — SIGNIFICANT CHANGE UP (ref 70–99)
HCT VFR BLD CALC: 40.1 % — SIGNIFICANT CHANGE UP (ref 34.5–45)
HGB BLD-MCNC: 12.8 G/DL — SIGNIFICANT CHANGE UP (ref 11.5–15.5)
IMM GRANULOCYTES NFR BLD AUTO: 0.2 % — SIGNIFICANT CHANGE UP (ref 0–0.9)
LYMPHOCYTES # BLD AUTO: 1.04 K/UL — SIGNIFICANT CHANGE UP (ref 1–3.3)
LYMPHOCYTES # BLD AUTO: 21.9 % — SIGNIFICANT CHANGE UP (ref 13–44)
MAGNESIUM SERPL-MCNC: 2 MG/DL — SIGNIFICANT CHANGE UP (ref 1.6–2.6)
MCHC RBC-ENTMCNC: 26.9 PG — LOW (ref 27–34)
MCHC RBC-ENTMCNC: 31.9 G/DL — LOW (ref 32–36)
MCV RBC AUTO: 84.2 FL — SIGNIFICANT CHANGE UP (ref 80–100)
MONOCYTES # BLD AUTO: 0.28 K/UL — SIGNIFICANT CHANGE UP (ref 0–0.9)
MONOCYTES NFR BLD AUTO: 5.9 % — SIGNIFICANT CHANGE UP (ref 2–14)
NEUTROPHILS # BLD AUTO: 3.3 K/UL — SIGNIFICANT CHANGE UP (ref 1.8–7.4)
NEUTROPHILS NFR BLD AUTO: 69.7 % — SIGNIFICANT CHANGE UP (ref 43–77)
NRBC BLD AUTO-RTO: 0 /100 WBCS — SIGNIFICANT CHANGE UP (ref 0–0)
PHOSPHATE SERPL-MCNC: 2.9 MG/DL — SIGNIFICANT CHANGE UP (ref 2.5–4.5)
PLATELET # BLD AUTO: 236 K/UL — SIGNIFICANT CHANGE UP (ref 150–400)
POTASSIUM SERPL-MCNC: 3.8 MMOL/L — SIGNIFICANT CHANGE UP (ref 3.5–5.3)
POTASSIUM SERPL-SCNC: 3.8 MMOL/L — SIGNIFICANT CHANGE UP (ref 3.5–5.3)
PROT SERPL-MCNC: 6.6 G/DL — SIGNIFICANT CHANGE UP (ref 6–8.3)
RBC # BLD: 4.76 M/UL — SIGNIFICANT CHANGE UP (ref 3.8–5.2)
RBC # FLD: 15.2 % — HIGH (ref 10.3–14.5)
RH IG SCN BLD-IMP: NEGATIVE — SIGNIFICANT CHANGE UP
SODIUM SERPL-SCNC: 137 MMOL/L — SIGNIFICANT CHANGE UP (ref 135–145)
WBC # BLD: 4.74 K/UL — SIGNIFICANT CHANGE UP (ref 3.8–10.5)
WBC # FLD AUTO: 4.74 K/UL — SIGNIFICANT CHANGE UP (ref 3.8–10.5)

## 2025-04-05 PROCEDURE — 99285 EMERGENCY DEPT VISIT HI MDM: CPT | Mod: GC

## 2025-04-05 PROCEDURE — 72082 X-RAY EXAM ENTIRE SPI 2/3 VW: CPT

## 2025-04-05 RX ORDER — ACETAMINOPHEN 500 MG/5ML
1000 LIQUID (ML) ORAL ONCE
Refills: 0 | Status: COMPLETED | OUTPATIENT
Start: 2025-04-05 | End: 2025-04-05

## 2025-04-05 RX ADMIN — Medication 400 MILLIGRAM(S): at 23:21

## 2025-04-05 RX ADMIN — Medication 1000 MILLIGRAM(S): at 23:16

## 2025-04-05 NOTE — ED ADULT NURSE NOTE - CHIEF COMPLAINT QUOTE
thoracic lumbar fusion with cage in September 2024 done here; twisting motion 11 days ago caused pain that is worsening; pain across lower waist, non radiating; x-rays done today outpt at [facility].

## 2025-04-05 NOTE — ED PROVIDER NOTE - ATTENDING WITH...
----- Message from Lizette Bryan, RN sent at 4/3/2017  1:21 PM CDT -----  ATTN: Please do not reply to me through this message, I work part time. Please send all replies to  Telemonitoring Clinical Communication PoolA change in patient condition has been identified: [facility] gainCurrent patient assessment includes: 3/26/17 245.8#3/27/17 247#3/29/17 246.6#4/2/17 249.6#4/3/17 249.6#, P 122/82, HR 91, pox 98%  Pt reports edema of legs and bloating. She says attempting to follow low sodium diet. She reports taking furosemide three times daily.Telemonitor trends are entered in epic record every Wednesday. Go to chart review, media tab, telemonitor graphic flow sheet. Clinician recommendationsplease contact pt with any instructions.  Alanna at [phone]   Resident

## 2025-04-05 NOTE — ED PROVIDER NOTE - PHYSICAL EXAMINATION
PHYSICAL EXAM:  Constitutional: In apparent discomfort when shifting in bed  HEENT: NC/AT, PERRLA, EOMI, no conjunctival pallor or scleral icterus, MMM  Neck: Supple, no JVD  Respiratory: CTA B/L. No w/r/r.   Cardiovascular: RRR, normal S1 and S2, no m/r/g.   Gastrointestinal: +BS, soft NTND, no guarding or rebound tenderness, no palpable masses   Extremities: wwp; no cyanosis, clubbing or edema.   Vascular: Pulses equal and strong throughout.   Neurological: AAOx3, no CN deficits, strength and sensation intact throughout., no saddle anesthesia  Skin: B/l crusting scars across lower back  Back: No point tenderness along spine; pain limited full back exam

## 2025-04-05 NOTE — ED ADULT NURSE NOTE - OBJECTIVE STATEMENT
75y F A&Ox4 c/o Lower back pain pt states starting 11 days ago pt was reaching for her alarm clock when she felt a pain in her lower back and has progressively became worse. pt states has had previoujs surgeries to back due to previous injuries. upon assessment pt is well appearing, PMS intact, moves all extremeites, breathing spontaneous and unlabored, full ROM in all extremeites. Pt denies: HA, fever, Chills, CP, SOB, N/v/D, GI/Gu symptoms. PMH:

## 2025-04-05 NOTE — ED PROVIDER NOTE - NSFOLLOWUPINSTRUCTIONS_ED_ALL_ED_FT
You were seen in the hospital for your acute back pain. You were given medication to help with your pain and received a CT scan of your back. The neurosurgery team reviewed your images and determined your back pain was not associated with your implanted surgical equipment. A rash was noted on your back that was consistent with shingles. This is a rash that can be extremely painful.     You've been prescribed the medication valtrex to help with your shingles. Please take this as prescribed. You've also been prescribed oxycodone to help with your pain, take this as prescribed as well.    Please follow-up with Dr. Stroud in two weeks to track your progress. Return to the hospital if you are unable to walk or develop acute concerns and wish for a re-examination.

## 2025-04-05 NOTE — ED PROVIDER NOTE - PROGRESS NOTE DETAILS
LABS:              9.1    14.0  )-----------( 450                  28.5     135  |  96  |  13  ----------------------------<  223<H>  4.7   |  23  |  0.50    Ca    9.7 Ayanna PGY-1: Neurosurgery assessed imaging, pain not associated with hardware. Will d/c patient with valtrex and NSGY follow-up.

## 2025-04-05 NOTE — ED PROVIDER NOTE - PATIENT PORTAL LINK FT
You can access the FollowMyHealth Patient Portal offered by [facility] by registering at the following website: [email or web address]. By joining [employer]’s FollowMyHealth portal, you will also be able to view your health information using other applications (apps) compatible with our system.

## 2025-04-05 NOTE — ED PROVIDER NOTE - ATTENDING CONTRIBUTION TO CARE
75-year-old female with history of hypertension obstructive sleep apnea A-fib on Eliquis and prior spinal surgery history presenting with back pain as well as itchiness patient reports the back pain after turning in the bed and has excruciating pain and is now positionally getting worse sought ectomy at her in the office and had x-rays done but was meant to get advanced imaging but had not been able to schedule it also for the past 2 to 3 days presents with left-sided itchiness she been scratching herself pretty intensively denies any fever chills says that she had shingles vaccine done denies any chest pain shortness of breath uses CPAP at night  On physical examination patient is well-appearing, comfortable no trauma to the brain head atraumatic neuro intact clear lungs S1-S2 soft nontender abdomen  Patient has symptoms of possible shingles will treat empirically as well as discussed with mucus team for advanced imaging CT versus MRI and given persistence of pain patient may need to be admitted we will do screening labs

## 2025-04-05 NOTE — ED ADULT NURSE NOTE - NSSEPSISSUSPECTED_ED_A_ED
Thoracentesis  Date/Time: 10/26/2019 6:19 PM  Performed by: Eagle Fisher M.D.  Authorized by: Eagle Fisher M.D.     Consent:     Consent obtained:  Written    Consent given by:  Patient    Risks discussed:  Bleeding, infection, pain, pneumothorax, nerve damage and incomplete drainage    Alternatives discussed:  Alternative treatment, delayed treatment, observation and no treatment  Anesthesia (see MAR for exact dosages):     Anesthesia method:  Local infiltration  Procedure details:     Preparation: Patient was prepped and draped in usual sterile fashion      Patient position:  Sitting    Location:  R posterior    Intercostal space:  7th    Puncture method:  Over-the-needle catheter    Ultrasound guidance: yes      Indwelling catheter placed: no      Needle gauge:  18    Catheter size:  6 Fr    Number of attempts:  1    Drainage characteristics:  Clear (yellow, 1050cc)  Post-procedure details:     Chest x-ray performed: yes      Chest x-ray findings:  Pleural effusion improved    Patient tolerance of procedure:  Tolerated well, no immediate complications    __________  Eagle Fisher MD  Pulmonary and Critical Care Medicine  [facility]       No

## 2025-04-05 NOTE — ED ADULT TRIAGE NOTE - CHIEF COMPLAINT QUOTE
thoracic lumbar fusion with cage in September 2024 done here; twisting motion 11 days ago caused pain that is worsening; pain across lower waist, non radiating; x-rays done today outpt at [facility].

## 2025-04-05 NOTE — ED PROVIDER NOTE - CLINICAL SUMMARY MEDICAL DECISION MAKING FREE TEXT BOX
Patient is a 74yo woman with history of HTN, SILVANA, A fib on eliquis, OA, and extensive prior spinal surgery most recently receiving thoracic lumbar fusion with cage with Dr. Stroud on 9/24 at [facility] presenting with 11 days of escalating lower back pain. The patient's symptoms began 11 days prior to presentation when the patient twisted in bed to silence her CPAP alarm. Since then the patient has been experiencing pain in her lower waist bilaterally. The pain does not radiate and she has not noticed any urinary/fecal incontinence or changes in sensation when going to the bathroom. The patient was managing the pain at home and has been ambulatory despite the pain. However, 3 days prior to presentation the patient noted a sharp increase in pain with additional itchiness in the same affected region.     The patient was assessed by Dr. Stroud on 4/4 and was recommended for additional imaging at that time. Patient was only able to schedule an x-ray on 4/5, however after receiving imaging the patient's pain became unbearable and she reported to the hospital with positional 9/10 pain.    Low concern for cauda equina syndrome, however presentation could represent compromise of prior cage placement vs shingles manifestation. Will obtain intake labs, get imaging, provide analgesia, and consult Dr. Stroud's team. Will continue to monitor.

## 2025-04-05 NOTE — ED PROVIDER NOTE - CARE PLAN
1 Principal Discharge DX:	Low back pain   Principal Discharge DX:	Low back pain  Secondary Diagnosis:	Shingles

## 2025-04-06 VITALS
DIASTOLIC BLOOD PRESSURE: 67 MMHG | TEMPERATURE: 98 F | RESPIRATION RATE: 18 BRPM | OXYGEN SATURATION: 98 % | SYSTOLIC BLOOD PRESSURE: 114 MMHG | HEART RATE: 68 BPM

## 2025-04-06 PROCEDURE — 72132 CT LUMBAR SPINE W/DYE: CPT | Mod: MC

## 2025-04-06 PROCEDURE — 86901 BLOOD TYPING SEROLOGIC RH(D): CPT

## 2025-04-06 PROCEDURE — 96374 THER/PROPH/DIAG INJ IV PUSH: CPT | Mod: XU

## 2025-04-06 PROCEDURE — 72129 CT CHEST SPINE W/DYE: CPT | Mod: 26

## 2025-04-06 PROCEDURE — 86850 RBC ANTIBODY SCREEN: CPT

## 2025-04-06 PROCEDURE — 80053 COMPREHEN METABOLIC PANEL: CPT

## 2025-04-06 PROCEDURE — 85025 COMPLETE CBC W/AUTO DIFF WBC: CPT

## 2025-04-06 PROCEDURE — 72129 CT CHEST SPINE W/DYE: CPT | Mod: MC

## 2025-04-06 PROCEDURE — 86900 BLOOD TYPING SEROLOGIC ABO: CPT

## 2025-04-06 PROCEDURE — 72132 CT LUMBAR SPINE W/DYE: CPT | Mod: 26

## 2025-04-06 PROCEDURE — 84100 ASSAY OF PHOSPHORUS: CPT

## 2025-04-06 PROCEDURE — 83735 ASSAY OF MAGNESIUM: CPT

## 2025-04-06 PROCEDURE — 99284 EMERGENCY DEPT VISIT MOD MDM: CPT | Mod: 25

## 2025-04-06 RX ORDER — KETOROLAC TROMETHAMINE 30 MG/ML
15 INJECTION, SOLUTION INTRAMUSCULAR; INTRAVENOUS ONCE
Refills: 0 | Status: DISCONTINUED | OUTPATIENT
Start: 2025-04-06 | End: 2025-04-06

## 2025-04-06 RX ORDER — OXYCODONE HYDROCHLORIDE 30 MG/1
2.5 TABLET ORAL ONCE
Refills: 0 | Status: DISCONTINUED | OUTPATIENT
Start: 2025-04-06 | End: 2025-04-06

## 2025-04-06 RX ORDER — OXYCODONE HYDROCHLORIDE 30 MG/1
1 TABLET ORAL
Qty: 9 | Refills: 0
Start: 2025-04-06 | End: 2025-04-08

## 2025-04-06 RX ADMIN — OXYCODONE HYDROCHLORIDE 2.5 MILLIGRAM(S): 30 TABLET ORAL at 04:52

## 2025-04-06 NOTE — PROGRESS NOTE ADULT - SUBJECTIVE AND OBJECTIVE BOX
p ([id number])     HPI:  75F, s/p T12 partial cropectomy with cage placement, T9-L3 fusion w/ Dr. Stroud in 9/2024. Done for T12 3-column fx. P/w 1.5wk of localized back pain that started after she abruptly twisted in bed. CT T/L-spine stable compared to 10/2024 postop scans, hardware intact, pending final read. Exam: Pain localizes to posterior T12 dermatome with macular rash, typically affecting one side of the dermatome at a time. AOx3, BUE 5/5, BHF/KE 4+/5 pain-limited, BDF/PF 5/5. Incision healing well.    =====================  PAST MEDICAL HISTORY   H/O seasonal allergies    History of pulmonary embolism    Restless leg syndrome    GERD (gastroesophageal reflux disease)    OA (osteoarthritis)    Fungal infection of skin    H/O scoliosis    Essential hypertension    Depression    Osteoporosis    Right hip pain    2019 novel coronavirus disease (COVID-19)    Hiatal hernia    History of chronic pain    Peripheral neuropathy      PAST SURGICAL HISTORY   S/P repair of paraesophageal hernia    S/P spinal fusion    Scoliosis    S/P spinal surgery    Removal of pin, plate, abigail, or screw    S/P hysterectomy    S/P hip replacement    S/P shoulder surgery    S/P dilatation and curettage    H/O arthroscopy of knee    H/O total knee replacement, right    S/P cataract surgery    History of bilateral hip replacements      Dilantin (Urticaria)  penicillins (Urticaria)  erythromycin (Stomach Upset; Vomiting; Nausea)      MEDICATIONS:  Antibiotics:    Neuro:    Other:      SOCIAL HISTORY:   Occupation:   Marital Status:     FAMILY HISTORY:  Family history of abdominal aortic aneurysm (AAA) (Mother)        ROS: Negative except per HPI    LABS:                          12.8   4.74  )-----------( 236      ( 05 Apr 2025 22:53 )             40.1     04-05    137  |  98  |  22  ----------------------------<  86  3.8   |  25  |  0.67    Ca    9.4      05 Apr 2025 22:53  Phos  2.9     04-05  Mg     2.0     04-05    TPro  6.6  /  Alb  4.0  /  TBili  0.4  /  DBili  x   /  AST  31  /  ALT  26  /  AlkPhos  184[H]  04-05

## 2025-04-06 NOTE — PROGRESS NOTE ADULT - ASSESSMENT
75F, s/p T12 partial cropectomy with cage placement, T9-L3 fusion w/ Dr. Stroud in 9/2024. Done for T12 3-column fx. P/w 1.5wk of localized back pain that started after she abruptly twisted in bed. CT T/L-spine stable compared to 10/2024 postop scans, hardware intact, pending final read. Exam: Pain localizes to posterior T12 dermatome with macular rash, typically affecting one side of the dermatome at a time. AOx3, BUE 5/5, BHF/KE 4+/5 pain-limited, BDF/PF 5/5. Incision healing well.  -Pain etiology not from surgical construct.  -Consider other etiologies (e.g. shingles).  -Outpt f/u with Dr. Stroud within 2 weeks after d/c

## 2025-04-08 ENCOUNTER — APPOINTMENT (OUTPATIENT)
Dept: INTERNAL MEDICINE | Facility: CLINIC | Age: 75
End: 2025-04-08
Payer: MEDICARE

## 2025-04-08 ENCOUNTER — OUTPATIENT (OUTPATIENT)
Dept: OUTPATIENT SERVICES | Facility: HOSPITAL | Age: 75
LOS: 1 days | End: 2025-04-08
Payer: MEDICARE

## 2025-04-08 DIAGNOSIS — I10 ESSENTIAL (PRIMARY) HYPERTENSION: ICD-10-CM

## 2025-04-08 DIAGNOSIS — Z98.49 CATARACT EXTRACTION STATUS, UNSPECIFIED EYE: Chronic | ICD-10-CM

## 2025-04-08 DIAGNOSIS — Z96.643 PRESENCE OF ARTIFICIAL HIP JOINT, BILATERAL: Chronic | ICD-10-CM

## 2025-04-08 DIAGNOSIS — B02.29 OTHER POSTHERPETIC NERVOUS SYSTEM INVOLVEMENT: ICD-10-CM

## 2025-04-08 DIAGNOSIS — Z98.890 OTHER SPECIFIED POSTPROCEDURAL STATES: Chronic | ICD-10-CM

## 2025-04-08 PROCEDURE — G2211 COMPLEX E/M VISIT ADD ON: CPT

## 2025-04-08 PROCEDURE — G0463: CPT

## 2025-04-08 PROCEDURE — 99213 OFFICE O/P EST LOW 20 MIN: CPT

## 2025-04-08 RX ORDER — LIDOCAINE 40 MG/G
4 PATCH TOPICAL
Qty: 1 | Refills: 3 | Status: ACTIVE | COMMUNITY
Start: 2025-04-08 | End: 1900-01-01

## 2025-04-08 NOTE — REVIEW OF SYSTEMS
Quality 226: Preventive Care And Screening: Tobacco Use: Screening And Cessation Intervention: Patient screened for tobacco use and is an ex/non-smoker Quality 431: Preventive Care And Screening: Unhealthy Alcohol Use - Screening: Patient not identified as an unhealthy alcohol user when screened for unhealthy alcohol use using a systematic screening method Detail Level: Detailed [As Noted in HPI] : as noted in HPI [Arthralgias] : arthralgias [Joint Pain] : joint pain [Skin Lesions] : no skin lesions [Muscle Weakness] : no muscle weakness [Negative] : Heme/Lymph [FreeTextEntry9] : pain in multiple joints especially L. hip

## 2025-04-10 ENCOUNTER — APPOINTMENT (OUTPATIENT)
Dept: CT IMAGING | Facility: CLINIC | Age: 75
End: 2025-04-10

## 2025-04-11 RX ORDER — HYDROMORPHONE HYDROCHLORIDE 4 MG/1
4 TABLET ORAL
Qty: 28 | Refills: 0 | Status: ACTIVE | COMMUNITY
Start: 2025-04-11 | End: 1900-01-01

## 2025-04-15 DIAGNOSIS — B02.29 OTHER POSTHERPETIC NERVOUS SYSTEM INVOLVEMENT: ICD-10-CM

## 2025-04-25 ENCOUNTER — RX RENEWAL (OUTPATIENT)
Age: 75
End: 2025-04-25

## 2025-04-25 ENCOUNTER — APPOINTMENT (OUTPATIENT)
Dept: BARIATRICS/WEIGHT MGMT | Facility: CLINIC | Age: 75
End: 2025-04-25
Payer: MEDICARE

## 2025-04-25 DIAGNOSIS — E66.9 OBESITY, UNSPECIFIED: ICD-10-CM

## 2025-04-25 PROCEDURE — 97803 MED NUTRITION INDIV SUBSEQ: CPT | Mod: GA,2W

## 2025-05-05 ENCOUNTER — APPOINTMENT (OUTPATIENT)
Dept: RADIOLOGY | Facility: CLINIC | Age: 75
End: 2025-05-05
Payer: MEDICARE

## 2025-05-05 ENCOUNTER — APPOINTMENT (OUTPATIENT)
Dept: CARDIOLOGY | Facility: CLINIC | Age: 75
End: 2025-05-05

## 2025-05-05 DIAGNOSIS — M54.9 DORSALGIA, UNSPECIFIED: ICD-10-CM

## 2025-05-05 PROCEDURE — 72082 X-RAY EXAM ENTIRE SPI 2/3 VW: CPT

## 2025-05-08 ENCOUNTER — APPOINTMENT (OUTPATIENT)
Dept: NEUROSURGERY | Facility: CLINIC | Age: 75
End: 2025-05-08

## 2025-05-08 VITALS
OXYGEN SATURATION: 95 % | DIASTOLIC BLOOD PRESSURE: 80 MMHG | HEIGHT: 64 IN | TEMPERATURE: 98.5 F | BODY MASS INDEX: 33.29 KG/M2 | WEIGHT: 195 LBS | HEART RATE: 85 BPM | SYSTOLIC BLOOD PRESSURE: 134 MMHG

## 2025-05-08 PROCEDURE — 99214 OFFICE O/P EST MOD 30 MIN: CPT

## 2025-05-09 NOTE — ED ADULT NURSE NOTE - PAIN RATING/NUMBER SCALE (0-10): ACTIVITY
Orders:    methylPREDNISolone 4 MG tablet therapy pack; Use as directed on package    MRI elbow left wo contrast; Future    
  Orders:    methylPREDNISolone 4 MG tablet therapy pack; Use as directed on package    MRI shoulder left wo contrast; Future    
MRI Left Shoulder for chronic pain evaluate for RTC tear  Orders:    Ambulatory referral to Orthopedic Surgery    methylPREDNISolone 4 MG tablet therapy pack; Use as directed on package    MRI shoulder left wo contrast; Future    
MRI Left elbow for chronic pain   Orders:    methylPREDNISolone 4 MG tablet therapy pack; Use as directed on package    MRI elbow left wo contrast; Future    
6 (moderate pain)

## 2025-05-09 NOTE — PROGRESS NOTE ADULT - PROBLEM/PLAN-9
<--- Click to Launch ICDx for PreOp, PostOp and Procedure
DISPLAY PLAN FREE TEXT

## 2025-05-13 NOTE — PROGRESS NOTE ADULT - PROBLEM SELECTOR PROBLEM 3
HTN (hypertension)
HTN (hypertension)
Isotretinoin Pregnancy And Lactation Text: This medication is Pregnancy Category X and is considered extremely dangerous during pregnancy. It is unknown if it is excreted in breast milk.

## 2025-05-15 ENCOUNTER — APPOINTMENT (OUTPATIENT)
Dept: MRI IMAGING | Facility: CLINIC | Age: 75
End: 2025-05-15
Payer: MEDICARE

## 2025-05-15 PROCEDURE — 72148 MRI LUMBAR SPINE W/O DYE: CPT

## 2025-05-15 PROCEDURE — 72146 MRI CHEST SPINE W/O DYE: CPT

## 2025-05-15 PROCEDURE — 72195 MRI PELVIS W/O DYE: CPT

## 2025-05-21 ENCOUNTER — APPOINTMENT (OUTPATIENT)
Dept: INTERNAL MEDICINE | Facility: CLINIC | Age: 75
End: 2025-05-21
Payer: MEDICARE

## 2025-05-21 ENCOUNTER — OUTPATIENT (OUTPATIENT)
Dept: OUTPATIENT SERVICES | Facility: HOSPITAL | Age: 75
LOS: 1 days | End: 2025-05-21
Payer: MEDICARE

## 2025-05-21 VITALS — HEART RATE: 88 BPM | SYSTOLIC BLOOD PRESSURE: 116 MMHG | DIASTOLIC BLOOD PRESSURE: 76 MMHG | RESPIRATION RATE: 16 BRPM

## 2025-05-21 DIAGNOSIS — R21 RASH AND OTHER NONSPECIFIC SKIN ERUPTION: ICD-10-CM

## 2025-05-21 DIAGNOSIS — Z96.643 PRESENCE OF ARTIFICIAL HIP JOINT, BILATERAL: Chronic | ICD-10-CM

## 2025-05-21 DIAGNOSIS — S82.031D DISPLACED TRANSVERSE FRACTURE OF RIGHT PATELLA, SUBSEQUENT ENCOUNTER FOR CLOSED FRACTURE WITH ROUTINE HEALING: ICD-10-CM

## 2025-05-21 DIAGNOSIS — M25.561 PAIN IN RIGHT KNEE: ICD-10-CM

## 2025-05-21 DIAGNOSIS — Z87.898 PERSONAL HISTORY OF OTHER SPECIFIED CONDITIONS: ICD-10-CM

## 2025-05-21 DIAGNOSIS — Z86.19 PERSONAL HISTORY OF OTHER INFECTIOUS AND PARASITIC DISEASES: ICD-10-CM

## 2025-05-21 DIAGNOSIS — T81.42XS: ICD-10-CM

## 2025-05-21 DIAGNOSIS — M46.20 OSTEOMYELITIS OF VERTEBRA, SITE UNSPECIFIED: ICD-10-CM

## 2025-05-21 DIAGNOSIS — Z01.818 ENCOUNTER FOR OTHER PREPROCEDURAL EXAMINATION: ICD-10-CM

## 2025-05-21 DIAGNOSIS — M54.9 DORSALGIA, UNSPECIFIED: ICD-10-CM

## 2025-05-21 DIAGNOSIS — Z12.11 ENCOUNTER FOR SCREENING FOR MALIGNANT NEOPLASM OF COLON: ICD-10-CM

## 2025-05-21 DIAGNOSIS — R07.89 OTHER CHEST PAIN: ICD-10-CM

## 2025-05-21 DIAGNOSIS — T84.84XA PAIN DUE TO INTERNAL ORTHOPEDIC PROSTHETIC DEVICES, IMPLANTS AND GRAFTS, INITIAL ENCOUNTER: ICD-10-CM

## 2025-05-21 DIAGNOSIS — S82.031G: ICD-10-CM

## 2025-05-21 DIAGNOSIS — Z23 ENCOUNTER FOR IMMUNIZATION: ICD-10-CM

## 2025-05-21 DIAGNOSIS — R93.5 ABNORMAL FINDINGS ON DIAGNOSTIC IMAGING OF OTHER ABDOMINAL REGIONS, INCLUDING RETROPERITONEUM: ICD-10-CM

## 2025-05-21 DIAGNOSIS — Z96.651 PAIN DUE TO INTERNAL ORTHOPEDIC PROSTHETIC DEVICES, IMPLANTS AND GRAFTS, INITIAL ENCOUNTER: ICD-10-CM

## 2025-05-21 DIAGNOSIS — Z87.19 PERSONAL HISTORY OF OTHER DISEASES OF THE DIGESTIVE SYSTEM: ICD-10-CM

## 2025-05-21 DIAGNOSIS — A49.01 METHICILLIN SUSCEPTIBLE STAPHYLOCOCCUS AUREUS INFECTION, UNSPECIFIED SITE: ICD-10-CM

## 2025-05-21 DIAGNOSIS — M79.646 PAIN IN UNSPECIFIED HAND: ICD-10-CM

## 2025-05-21 DIAGNOSIS — Z01.810 ENCOUNTER FOR PREPROCEDURAL CARDIOVASCULAR EXAMINATION: ICD-10-CM

## 2025-05-21 DIAGNOSIS — Z98.890 OTHER SPECIFIED POSTPROCEDURAL STATES: Chronic | ICD-10-CM

## 2025-05-21 DIAGNOSIS — U07.1 COVID-19: ICD-10-CM

## 2025-05-21 DIAGNOSIS — Z86.79 PERSONAL HISTORY OF OTHER DISEASES OF THE CIRCULATORY SYSTEM: ICD-10-CM

## 2025-05-21 DIAGNOSIS — K44.9 DIAPHRAGMATIC HERNIA W/OUT OBSTRUCTION OR GANGRENE: ICD-10-CM

## 2025-05-21 DIAGNOSIS — Z86.39 PERSONAL HISTORY OF OTHER ENDOCRINE, NUTRITIONAL AND METABOLIC DISEASE: ICD-10-CM

## 2025-05-21 DIAGNOSIS — R07.81 PLEURODYNIA: ICD-10-CM

## 2025-05-21 DIAGNOSIS — I10 ESSENTIAL (PRIMARY) HYPERTENSION: ICD-10-CM

## 2025-05-21 DIAGNOSIS — Z98.49 CATARACT EXTRACTION STATUS, UNSPECIFIED EYE: Chronic | ICD-10-CM

## 2025-05-21 DIAGNOSIS — M79.643 PAIN IN UNSPECIFIED HAND: ICD-10-CM

## 2025-05-21 PROCEDURE — 99214 OFFICE O/P EST MOD 30 MIN: CPT

## 2025-05-21 PROCEDURE — G0463: CPT

## 2025-05-22 ENCOUNTER — OUTPATIENT (OUTPATIENT)
Dept: OUTPATIENT SERVICES | Facility: HOSPITAL | Age: 75
LOS: 1 days | End: 2025-05-22

## 2025-05-22 VITALS
TEMPERATURE: 99 F | DIASTOLIC BLOOD PRESSURE: 82 MMHG | SYSTOLIC BLOOD PRESSURE: 127 MMHG | WEIGHT: 195.11 LBS | RESPIRATION RATE: 16 BRPM | OXYGEN SATURATION: 97 % | HEIGHT: 64 IN | HEART RATE: 88 BPM

## 2025-05-22 DIAGNOSIS — Z87.898 PERSONAL HISTORY OF OTHER SPECIFIED CONDITIONS: ICD-10-CM

## 2025-05-22 DIAGNOSIS — Z98.890 OTHER SPECIFIED POSTPROCEDURAL STATES: Chronic | ICD-10-CM

## 2025-05-22 DIAGNOSIS — Z86.19 PERSONAL HISTORY OF OTHER INFECTIOUS AND PARASITIC DISEASES: ICD-10-CM

## 2025-05-22 DIAGNOSIS — S22.009A UNSPECIFIED FRACTURE OF UNSPECIFIED THORACIC VERTEBRA, INITIAL ENCOUNTER FOR CLOSED FRACTURE: ICD-10-CM

## 2025-05-22 DIAGNOSIS — E11.9 TYPE 2 DIABETES MELLITUS WITHOUT COMPLICATIONS: ICD-10-CM

## 2025-05-22 DIAGNOSIS — I10 ESSENTIAL (PRIMARY) HYPERTENSION: ICD-10-CM

## 2025-05-22 DIAGNOSIS — G47.33 OBSTRUCTIVE SLEEP APNEA (ADULT) (PEDIATRIC): ICD-10-CM

## 2025-05-22 PROBLEM — M25.551 PAIN IN RIGHT HIP: Chronic | Status: INACTIVE | Noted: 2021-12-23 | Resolved: 2025-05-22

## 2025-05-22 LAB
A1C WITH ESTIMATED AVERAGE GLUCOSE RESULT: 5.6 % — SIGNIFICANT CHANGE UP (ref 4–5.6)
ANION GAP SERPL CALC-SCNC: 14 MMOL/L — SIGNIFICANT CHANGE UP (ref 7–14)
BASOPHILS # BLD AUTO: 0.03 K/UL — SIGNIFICANT CHANGE UP (ref 0–0.2)
BASOPHILS NFR BLD AUTO: 0.5 % — SIGNIFICANT CHANGE UP (ref 0–2)
BLD GP AB SCN SERPL QL: NEGATIVE — SIGNIFICANT CHANGE UP
BUN SERPL-MCNC: 38 MG/DL — HIGH (ref 7–23)
CALCIUM SERPL-MCNC: 9.9 MG/DL — SIGNIFICANT CHANGE UP (ref 8.4–10.5)
CHLORIDE SERPL-SCNC: 102 MMOL/L — SIGNIFICANT CHANGE UP (ref 98–107)
CO2 SERPL-SCNC: 23 MMOL/L — SIGNIFICANT CHANGE UP (ref 22–31)
CREAT SERPL-MCNC: 0.74 MG/DL — SIGNIFICANT CHANGE UP (ref 0.5–1.3)
EGFR: 83 ML/MIN/1.73M2 — SIGNIFICANT CHANGE UP
EGFR: 83 ML/MIN/1.73M2 — SIGNIFICANT CHANGE UP
EOSINOPHIL # BLD AUTO: 0.05 K/UL — SIGNIFICANT CHANGE UP (ref 0–0.5)
EOSINOPHIL NFR BLD AUTO: 0.9 % — SIGNIFICANT CHANGE UP (ref 0–6)
ESTIMATED AVERAGE GLUCOSE: 114 — SIGNIFICANT CHANGE UP
GLUCOSE SERPL-MCNC: 98 MG/DL — SIGNIFICANT CHANGE UP (ref 70–99)
HCT VFR BLD CALC: 41.2 % — SIGNIFICANT CHANGE UP (ref 34.5–45)
HGB BLD-MCNC: 13 G/DL — SIGNIFICANT CHANGE UP (ref 11.5–15.5)
IMM GRANULOCYTES NFR BLD AUTO: 0.4 % — SIGNIFICANT CHANGE UP (ref 0–0.9)
LYMPHOCYTES # BLD AUTO: 0.91 K/UL — LOW (ref 1–3.3)
LYMPHOCYTES # BLD AUTO: 16.5 % — SIGNIFICANT CHANGE UP (ref 13–44)
MCHC RBC-ENTMCNC: 27.5 PG — SIGNIFICANT CHANGE UP (ref 27–34)
MCHC RBC-ENTMCNC: 31.6 G/DL — LOW (ref 32–36)
MCV RBC AUTO: 87.1 FL — SIGNIFICANT CHANGE UP (ref 80–100)
MONOCYTES # BLD AUTO: 0.36 K/UL — SIGNIFICANT CHANGE UP (ref 0–0.9)
MONOCYTES NFR BLD AUTO: 6.5 % — SIGNIFICANT CHANGE UP (ref 2–14)
MRSA PCR RESULT.: SIGNIFICANT CHANGE UP
NEUTROPHILS # BLD AUTO: 4.13 K/UL — SIGNIFICANT CHANGE UP (ref 1.8–7.4)
NEUTROPHILS NFR BLD AUTO: 75.2 % — SIGNIFICANT CHANGE UP (ref 43–77)
PLATELET # BLD AUTO: 207 K/UL — SIGNIFICANT CHANGE UP (ref 150–400)
POTASSIUM SERPL-MCNC: 3.6 MMOL/L — SIGNIFICANT CHANGE UP (ref 3.5–5.3)
POTASSIUM SERPL-SCNC: 3.6 MMOL/L — SIGNIFICANT CHANGE UP (ref 3.5–5.3)
RBC # BLD: 4.73 M/UL — SIGNIFICANT CHANGE UP (ref 3.8–5.2)
RBC # FLD: 15.9 % — HIGH (ref 10.3–14.5)
RH IG SCN BLD-IMP: NEGATIVE — SIGNIFICANT CHANGE UP
RH IG SCN BLD-IMP: NEGATIVE — SIGNIFICANT CHANGE UP
S AUREUS DNA NOSE QL NAA+PROBE: SIGNIFICANT CHANGE UP
SODIUM SERPL-SCNC: 139 MMOL/L — SIGNIFICANT CHANGE UP (ref 135–145)
WBC # BLD: 5.5 K/UL — SIGNIFICANT CHANGE UP (ref 3.8–10.5)
WBC # FLD AUTO: 5.5 K/UL — SIGNIFICANT CHANGE UP (ref 3.8–10.5)

## 2025-05-22 RX ORDER — METOPROLOL SUCCINATE 50 MG/1
100 TABLET, EXTENDED RELEASE ORAL
Refills: 0 | DISCHARGE

## 2025-05-22 NOTE — H&P PST ADULT - PROBLEM SELECTOR PLAN 7
Pre-op Delirium Screening Questionnaire:    Patient eligible for jory risk screen age>75? Yes (if >75 then done)  Health care proxy paperwork given to patient? Yes (all patients should be given the packet to fill out at home and return on day of surgery to pre-op RN)    Impaired mobility (ie: uses cane, walker, wheelchair, or assist device)? Yes  Known dementia diagnosis/previous delirium? no  Impaired functional status (METS<4)? no  Malnutrition BMI<20? no    Surgeon notified via email regarding screening results.

## 2025-05-22 NOTE — H&P PST ADULT - NSICDXPASTMEDICALHX_GEN_ALL_CORE_FT
PAST MEDICAL HISTORY:  2019 novel coronavirus disease (COVID-19) Dec 2020- hospitalized for 3 days, did not require home O2, denies residual symptoms    Anemia     Chronic constipation     Chronic GERD     Closed fracture of thoracic vertebra     Delayed gastric emptying     Depression     DM2 (diabetes mellitus, type 2)     Essential hypertension     GERD (gastroesophageal reflux disease)     H/O scoliosis     H/O seasonal allergies     Hearing loss     Hiatal hernia     History of chronic pain     History of pulmonary embolism developed after billings abigail surgery,1984 treated with coumadin 3 months, no issues after    MSSA (methicillin susceptible Staphylococcus aureus) infection     OA (osteoarthritis)     SILVANA on CPAP     Osteoporosis     Paroxysmal atrial fibrillation     Peripheral neuropathy     PFO (patent foramen ovale)     Postlaminectomy syndrome     Restless leg syndrome

## 2025-05-22 NOTE — H&P PST ADULT - GASTROINTESTINAL COMMENTS
hiatal hernia repair, second surgery in 2023,  followed by the development of esophageal ulcer and the finding of delayed gastric emptying, has chronic gerd and constipation,  reports condition is stable, managed on medication, last GI visit  was about 6 months ago

## 2025-05-22 NOTE — H&P PST ADULT - NSICDXPASTSURGICALHX_GEN_ALL_CORE_FT
PAST SURGICAL HISTORY:  H/O arthroscopy of knee June 2021    History of bilateral hip replacements     History of repair of hiatal hernia     Removal of pin, plate, abigail, or screw 1991- removal of billings abigail    S/P cataract surgery     S/P dilatation and curettage 1981    S/P hip replacement left (2008)    S/P hysterectomy 1982    S/P repair of paraesophageal hernia 2001    S/P shoulder surgery right (2012)    S/P spinal fusion L4-L5 1966    S/P spinal surgery x 2 1985, 1986    Scoliosis s/p placement of billings abigail x 2 1984

## 2025-05-22 NOTE — H&P PST ADULT - LAST ECHOCARDIOGRAM
10/2024 ([facility]) Left ventricular systolic function is normal, Patent foramen ovale by color flow Doppler, Small pericardial effusion

## 2025-05-22 NOTE — H&P PST ADULT - SKIN/BREAST COMMENTS
Pt was treated for shingles in 4/2025, symptoms resolved  (evaluated in [facility])

## 2025-05-22 NOTE — H&P PST ADULT - PROBLEM SELECTOR PLAN 1
Patient tentatively scheduled for T4-Pelvis posterior laminectomy and fusion with posterior instrumentation for 6/4/25. Pre-op instructions provided as per ERAS protocol. Pt given verbal and written instructions with teach back on chlorhexidine shampoo (x3 days). Pt verbalized understanding with return demonstration.     PST CBC T&S ABO BMP A1C MRSA nasal swab done.    Medical evaluation 5/21/15 in [facility]  "She is an acceptable risk to proceed with the planned procedure. Her chronic diseases are medically optimized. Hold Eliquis 3 days before procedure"

## 2025-05-22 NOTE — H&P PST ADULT - HISTORY OF PRESENT ILLNESS
76 y/o female PMH of HTN, SILVANA, PAF on Eliquis PE (in 1980s) OA, hiatal hernia (repair 2023) gastroparesis, peripheral neuropathy, SILVANA on CPAP, and extensive prior spinal surgery most recently receiving thoracic lumbar fusion with cage with Dr. Stroud on 9/24, after a fall, complicated by sepsis, and paroxysmal atrial fibrillation, development of pleural effusions, symptomatic orthostatic hypotension, urinary retention, post op anemia, chest pain with spontaneous resolution, and paraspinal abscess s/p paraspinal collection aspiration on 10/25/24 presents to presurgical testing with diagnosis of unspecified fracture thoracic vertebrae, scoliosis, and postlaminectomy syndrome. Pt twisted her back a few weeks ago resulting in vertebral fracture. Pt is scheduled for T4-Pelvis posterior laminectomy and fusion with posterior instrumentation.        74 y/o female PMH of HTN, SILVANA, PAF on Eliquis PE (in 1980s) OA, hiatal hernia (repair 2023) gastroparesis, peripheral neuropathy, SILVANA on CPAP, and extensive prior spinal surgery most recently receiving thoracic lumbar fusion with cage with Dr. Stroud on 9/24, after a fall, complicated by sepsis, and paroxysmal atrial fibrillation, development of pleural effusions, symptomatic orthostatic hypotension, urinary retention, post op anemia, chest pain with spontaneous resolution, and paraspinal abscess s/p paraspinal collection aspiration on 10/25/24 presents to presurgical testing with diagnosis of unspecified fracture thoracic vertebrae, scoliosis, and postlaminectomy syndrome. Pt twisted her back a few weeks ago resulting in vertebral fracture. Pt is scheduled for T4-Pelvis posterior laminectomy and fusion with posterior instrumentation. Possible corpectomy T9. Lumbar osteotomy and illiac fixation.

## 2025-05-22 NOTE — H&P PST ADULT - PRIMARY CARE PROVIDER
Dr. Carr [phone]                                                                                                                                                                      Dr [facility] cardiologist [phone]

## 2025-05-22 NOTE — H&P PST ADULT - ASSESSMENT
unspecified fracture thoracic vertebrae, scoliosis, and postlaminectomy syndrome unspecified fracture thoracic vertebrae, scoliosis, and postlaminectomy syndrome. Thoracic fracture. Proximal junctional Kyphosis.

## 2025-05-22 NOTE — H&P PST ADULT - PROBLEM SELECTOR PLAN 5
email notification sent to pain management team.     Pt instructed to continue hydromorphone as needed.    Pt instructed to hold Celebrex one week prior to procedure, last dose on Thursday 5/27/25

## 2025-05-22 NOTE — H&P PST ADULT - OTHER CARE PROVIDERS
Pain Management Dr. Bertha Brantley [phone]                                                                                                                        Kaushal Frazier ID [phone]

## 2025-05-23 ENCOUNTER — APPOINTMENT (OUTPATIENT)
Dept: ORTHOPEDIC SURGERY | Facility: CLINIC | Age: 75
End: 2025-05-23

## 2025-05-23 VITALS — WEIGHT: 187 LBS | BODY MASS INDEX: 31.92 KG/M2 | HEIGHT: 64 IN

## 2025-05-23 DIAGNOSIS — M40.209 UNSPECIFIED KYPHOSIS, SITE UNSPECIFIED: ICD-10-CM

## 2025-05-23 DIAGNOSIS — S22.009A UNSPECIFIED FRACTURE OF UNSPECIFIED THORACIC VERTEBRA, INITIAL ENCOUNTER FOR CLOSED FRACTURE: ICD-10-CM

## 2025-05-23 PROCEDURE — 99214 OFFICE O/P EST MOD 30 MIN: CPT

## 2025-05-27 PROBLEM — H91.90 UNSPECIFIED HEARING LOSS, UNSPECIFIED EAR: Chronic | Status: ACTIVE | Noted: 2025-05-22

## 2025-05-27 PROBLEM — G47.33 OBSTRUCTIVE SLEEP APNEA (ADULT) (PEDIATRIC): Chronic | Status: ACTIVE | Noted: 2025-05-22

## 2025-05-27 PROBLEM — K21.9 GASTRO-ESOPHAGEAL REFLUX DISEASE WITHOUT ESOPHAGITIS: Chronic | Status: ACTIVE | Noted: 2025-05-22

## 2025-05-27 PROBLEM — D64.9 ANEMIA, UNSPECIFIED: Chronic | Status: ACTIVE | Noted: 2025-05-22

## 2025-05-27 PROBLEM — Q21.12 PATENT FORAMEN OVALE: Chronic | Status: ACTIVE | Noted: 2025-05-22

## 2025-05-27 PROBLEM — M96.1 POSTLAMINECTOMY SYNDROME, NOT ELSEWHERE CLASSIFIED: Chronic | Status: ACTIVE | Noted: 2025-05-22

## 2025-05-27 PROBLEM — A49.01 METHICILLIN SUSCEPTIBLE STAPHYLOCOCCUS AUREUS INFECTION, UNSPECIFIED SITE: Chronic | Status: ACTIVE | Noted: 2025-05-22

## 2025-05-27 PROBLEM — I48.0 PAROXYSMAL ATRIAL FIBRILLATION: Chronic | Status: ACTIVE | Noted: 2025-05-22

## 2025-05-27 PROBLEM — K59.09 OTHER CONSTIPATION: Chronic | Status: ACTIVE | Noted: 2025-05-22

## 2025-05-27 PROBLEM — E11.9 TYPE 2 DIABETES MELLITUS WITHOUT COMPLICATIONS: Chronic | Status: ACTIVE | Noted: 2025-05-22

## 2025-05-27 PROBLEM — K30 FUNCTIONAL DYSPEPSIA: Chronic | Status: ACTIVE | Noted: 2025-05-22

## 2025-05-27 PROBLEM — S22.009A UNSPECIFIED FRACTURE OF UNSPECIFIED THORACIC VERTEBRA, INITIAL ENCOUNTER FOR CLOSED FRACTURE: Chronic | Status: ACTIVE | Noted: 2025-05-22

## 2025-05-28 ENCOUNTER — EMERGENCY (EMERGENCY)
Facility: HOSPITAL | Age: 75
LOS: 1 days | End: 2025-05-28
Attending: EMERGENCY MEDICINE
Payer: MEDICARE

## 2025-05-28 ENCOUNTER — APPOINTMENT (OUTPATIENT)
Dept: NUCLEAR MEDICINE | Facility: CLINIC | Age: 75
End: 2025-05-28

## 2025-05-28 VITALS
OXYGEN SATURATION: 97 % | TEMPERATURE: 98 F | WEIGHT: 186.95 LBS | HEART RATE: 84 BPM | DIASTOLIC BLOOD PRESSURE: 76 MMHG | SYSTOLIC BLOOD PRESSURE: 133 MMHG | HEIGHT: 64 IN | RESPIRATION RATE: 20 BRPM

## 2025-05-28 DIAGNOSIS — K11.20 SIALOADENITIS, UNSPECIFIED: ICD-10-CM

## 2025-05-28 DIAGNOSIS — Z98.49 CATARACT EXTRACTION STATUS, UNSPECIFIED EYE: Chronic | ICD-10-CM

## 2025-05-28 DIAGNOSIS — Z96.643 PRESENCE OF ARTIFICIAL HIP JOINT, BILATERAL: Chronic | ICD-10-CM

## 2025-05-28 DIAGNOSIS — Z98.890 OTHER SPECIFIED POSTPROCEDURAL STATES: Chronic | ICD-10-CM

## 2025-05-28 LAB
ALBUMIN SERPL ELPH-MCNC: 4 G/DL — SIGNIFICANT CHANGE UP (ref 3.3–5)
ALP SERPL-CCNC: 157 U/L — HIGH (ref 40–120)
ALT FLD-CCNC: 31 U/L — SIGNIFICANT CHANGE UP (ref 10–45)
ANION GAP SERPL CALC-SCNC: 14 MMOL/L — SIGNIFICANT CHANGE UP (ref 5–17)
AST SERPL-CCNC: 32 U/L — SIGNIFICANT CHANGE UP (ref 10–40)
BASOPHILS # BLD AUTO: 0.04 K/UL — SIGNIFICANT CHANGE UP (ref 0–0.2)
BASOPHILS NFR BLD AUTO: 0.9 % — SIGNIFICANT CHANGE UP (ref 0–2)
BILIRUB SERPL-MCNC: 0.3 MG/DL — SIGNIFICANT CHANGE UP (ref 0.2–1.2)
BUN SERPL-MCNC: 24 MG/DL — HIGH (ref 7–23)
CALCIUM SERPL-MCNC: 9.5 MG/DL — SIGNIFICANT CHANGE UP (ref 8.4–10.5)
CHLORIDE SERPL-SCNC: 106 MMOL/L — SIGNIFICANT CHANGE UP (ref 96–108)
CO2 SERPL-SCNC: 22 MMOL/L — SIGNIFICANT CHANGE UP (ref 22–31)
CREAT SERPL-MCNC: 1.15 MG/DL — SIGNIFICANT CHANGE UP (ref 0.5–1.3)
EGFR: 49 ML/MIN/1.73M2 — LOW
EGFR: 49 ML/MIN/1.73M2 — LOW
EOSINOPHIL # BLD AUTO: 0.12 K/UL — SIGNIFICANT CHANGE UP (ref 0–0.5)
EOSINOPHIL NFR BLD AUTO: 2.6 % — SIGNIFICANT CHANGE UP (ref 0–6)
GLUCOSE SERPL-MCNC: 138 MG/DL — HIGH (ref 70–99)
HCT VFR BLD CALC: 40.4 % — SIGNIFICANT CHANGE UP (ref 34.5–45)
HGB BLD-MCNC: 12.6 G/DL — SIGNIFICANT CHANGE UP (ref 11.5–15.5)
IMM GRANULOCYTES NFR BLD AUTO: 0.4 % — SIGNIFICANT CHANGE UP (ref 0–0.9)
LYMPHOCYTES # BLD AUTO: 1.07 K/UL — SIGNIFICANT CHANGE UP (ref 1–3.3)
LYMPHOCYTES # BLD AUTO: 22.8 % — SIGNIFICANT CHANGE UP (ref 13–44)
MCHC RBC-ENTMCNC: 27.6 PG — SIGNIFICANT CHANGE UP (ref 27–34)
MCHC RBC-ENTMCNC: 31.2 G/DL — LOW (ref 32–36)
MCV RBC AUTO: 88.6 FL — SIGNIFICANT CHANGE UP (ref 80–100)
MONOCYTES # BLD AUTO: 0.3 K/UL — SIGNIFICANT CHANGE UP (ref 0–0.9)
MONOCYTES NFR BLD AUTO: 6.4 % — SIGNIFICANT CHANGE UP (ref 2–14)
NEUTROPHILS # BLD AUTO: 3.15 K/UL — SIGNIFICANT CHANGE UP (ref 1.8–7.4)
NEUTROPHILS NFR BLD AUTO: 66.9 % — SIGNIFICANT CHANGE UP (ref 43–77)
NRBC BLD AUTO-RTO: 0 /100 WBCS — SIGNIFICANT CHANGE UP (ref 0–0)
PLATELET # BLD AUTO: 192 K/UL — SIGNIFICANT CHANGE UP (ref 150–400)
POTASSIUM SERPL-MCNC: 4.1 MMOL/L — SIGNIFICANT CHANGE UP (ref 3.5–5.3)
POTASSIUM SERPL-SCNC: 4.1 MMOL/L — SIGNIFICANT CHANGE UP (ref 3.5–5.3)
PROT SERPL-MCNC: 6.4 G/DL — SIGNIFICANT CHANGE UP (ref 6–8.3)
RBC # BLD: 4.56 M/UL — SIGNIFICANT CHANGE UP (ref 3.8–5.2)
RBC # FLD: 15.7 % — HIGH (ref 10.3–14.5)
SODIUM SERPL-SCNC: 142 MMOL/L — SIGNIFICANT CHANGE UP (ref 135–145)
T3 SERPL-MCNC: 142 NG/DL — SIGNIFICANT CHANGE UP (ref 80–200)
T4 AB SER-ACNC: 7 UG/DL — SIGNIFICANT CHANGE UP (ref 4.6–12)
TSH SERPL-MCNC: 2.17 UIU/ML — SIGNIFICANT CHANGE UP (ref 0.27–4.2)
WBC # BLD: 4.7 K/UL — SIGNIFICANT CHANGE UP (ref 3.8–10.5)
WBC # FLD AUTO: 4.7 K/UL — SIGNIFICANT CHANGE UP (ref 3.8–10.5)

## 2025-05-28 PROCEDURE — 99283 EMERGENCY DEPT VISIT LOW MDM: CPT

## 2025-05-28 PROCEDURE — 99223 1ST HOSP IP/OBS HIGH 75: CPT | Mod: FS

## 2025-05-28 PROCEDURE — 70491 CT SOFT TISSUE NECK W/DYE: CPT | Mod: 26

## 2025-05-28 RX ORDER — APIXABAN 2.5 MG/1
5 TABLET, FILM COATED ORAL EVERY 12 HOURS
Refills: 0 | Status: DISCONTINUED | OUTPATIENT
Start: 2025-05-28 | End: 2025-05-28

## 2025-05-28 RX ORDER — DEXAMETHASONE 0.5 MG/1
10 TABLET ORAL ONCE
Refills: 0 | Status: COMPLETED | OUTPATIENT
Start: 2025-05-28 | End: 2025-05-28

## 2025-05-28 RX ORDER — ACETAMINOPHEN 500 MG/5ML
1000 LIQUID (ML) ORAL ONCE
Refills: 0 | Status: COMPLETED | OUTPATIENT
Start: 2025-05-28 | End: 2025-05-28

## 2025-05-28 RX ORDER — ATORVASTATIN CALCIUM 80 MG/1
40 TABLET, FILM COATED ORAL AT BEDTIME
Refills: 0 | Status: ACTIVE | OUTPATIENT
Start: 2025-05-28 | End: 2026-04-26

## 2025-05-28 RX ORDER — CYCLOBENZAPRINE HYDROCHLORIDE 15 MG/1
10 CAPSULE, EXTENDED RELEASE ORAL
Refills: 0 | Status: ACTIVE | OUTPATIENT
Start: 2025-05-28 | End: 2026-04-26

## 2025-05-28 RX ORDER — HYDROMORPHONE/SOD CHLOR,ISO/PF 2 MG/10 ML
1 SYRINGE (ML) INJECTION ONCE
Refills: 0 | Status: DISCONTINUED | OUTPATIENT
Start: 2025-05-28 | End: 2025-05-28

## 2025-05-28 RX ORDER — PREDNISONE 20 MG/1
40 TABLET ORAL ONCE
Refills: 0 | Status: COMPLETED | OUTPATIENT
Start: 2025-05-28 | End: 2025-05-28

## 2025-05-28 RX ORDER — CELECOXIB 50 MG/1
100 CAPSULE ORAL
Refills: 0 | Status: ACTIVE | OUTPATIENT
Start: 2025-05-28 | End: 2026-04-26

## 2025-05-28 RX ORDER — HYDROMORPHONE/SOD CHLOR,ISO/PF 2 MG/10 ML
4 SYRINGE (ML) INJECTION ONCE
Refills: 0 | Status: DISCONTINUED | OUTPATIENT
Start: 2025-05-28 | End: 2025-05-28

## 2025-05-28 RX ORDER — DEXAMETHASONE 0.5 MG/1
10 TABLET ORAL EVERY 8 HOURS
Refills: 0 | Status: COMPLETED | OUTPATIENT
Start: 2025-05-28 | End: 2025-05-29

## 2025-05-28 RX ORDER — CLINDAMYCIN PHOSPHATE 150 MG/ML
600 VIAL (ML) INJECTION ONCE
Refills: 0 | Status: COMPLETED | OUTPATIENT
Start: 2025-05-28 | End: 2025-05-28

## 2025-05-28 RX ORDER — CLINDAMYCIN PHOSPHATE 150 MG/ML
600 VIAL (ML) INJECTION EVERY 8 HOURS
Refills: 0 | Status: ACTIVE | OUTPATIENT
Start: 2025-05-28 | End: 2026-04-26

## 2025-05-28 RX ORDER — CHLORTHALIDONE 25 MG/1
25 TABLET ORAL DAILY
Refills: 0 | Status: ACTIVE | OUTPATIENT
Start: 2025-05-28 | End: 2026-04-26

## 2025-05-28 RX ORDER — PREGABALIN 75 MG/1
100 CAPSULE ORAL
Refills: 0 | Status: COMPLETED | OUTPATIENT
Start: 2025-05-28 | End: 2025-06-04

## 2025-05-28 RX ADMIN — CYCLOBENZAPRINE HYDROCHLORIDE 10 MILLIGRAM(S): 15 CAPSULE, EXTENDED RELEASE ORAL at 18:34

## 2025-05-28 RX ADMIN — DEXAMETHASONE 100 MILLIGRAM(S): 0.5 TABLET ORAL at 20:38

## 2025-05-28 RX ADMIN — Medication 100 MILLIGRAM(S): at 17:31

## 2025-05-28 RX ADMIN — ATORVASTATIN CALCIUM 40 MILLIGRAM(S): 80 TABLET, FILM COATED ORAL at 20:38

## 2025-05-28 RX ADMIN — DEXAMETHASONE 100 MILLIGRAM(S): 0.5 TABLET ORAL at 11:36

## 2025-05-28 RX ADMIN — Medication 100 MILLIGRAM(S): at 08:41

## 2025-05-28 RX ADMIN — Medication 4 MILLIGRAM(S): at 18:34

## 2025-05-28 RX ADMIN — CELECOXIB 100 MILLIGRAM(S): 50 CAPSULE ORAL at 18:34

## 2025-05-28 RX ADMIN — Medication 20 MILLIGRAM(S): at 06:32

## 2025-05-28 RX ADMIN — Medication 400 MILLIGRAM(S): at 22:49

## 2025-05-28 RX ADMIN — PREGABALIN 100 MILLIGRAM(S): 75 CAPSULE ORAL at 18:33

## 2025-05-28 RX ADMIN — Medication 400 MILLIGRAM(S): at 06:33

## 2025-05-28 RX ADMIN — Medication 1 MILLIGRAM(S): at 06:55

## 2025-05-28 RX ADMIN — CHLORTHALIDONE 25 MILLIGRAM(S): 25 TABLET ORAL at 20:39

## 2025-05-28 RX ADMIN — PREDNISONE 40 MILLIGRAM(S): 20 TABLET ORAL at 03:51

## 2025-05-28 NOTE — CONSULT NOTE ADULT - ASSESSMENT
74yo woman with history of HTN, SILVANA, A fib on eliquis, OA, presents to ED for acute onset neck swelling x 2 days. Known thoracic lumbar fusion 9/24 c/b sepsis 2/2 paraspinal abscess on cefadroxil daily. Scheduled for T4-Pelvis posterior laminectomy and fusion with posterior instrumentation w/ Dr. Stroud. On exam, floor of mouth mild edema. no pus expressed from selvin's duct, no erythema. submandibular swelling, TTP. WBC 4, afebrile. CT neck shows b/l submandibular sialadenitis, no abscess, right thyroid nodule.  74yo woman with history of HTN, SILVANA, A fib on eliquis, OA, presents to ED for acute onset neck swelling x 2 days. Known thoracic lumbar fusion 9/24 c/b sepsis 2/2 paraspinal abscess on cefadroxil daily. Scheduled for T4-Pelvis posterior laminectomy and fusion with posterior instrumentation w/ Dr. Stroud. On exam, no pus expressed from selvin's duct, no erythema. minimal submandibular swelling, TTP. WBC 4, afebrile. CT neck shows b/l submandibular sialadenitis, no abscess, right thyroid nodule.

## 2025-05-28 NOTE — CONSULT NOTE ADULT - SUBJECTIVE AND OBJECTIVE BOX
CC: neck swelling    HPI: 74yo woman with history of HTN, SILVANA, A fib on eliquis, OA, presents to ED for acute onset neck swelling x 2 days. Known thoracic lumbar fusion 9/24 c/b sepsis 2/2 paraspinal abscess on cefadroxil daily. Scheduled for T4-Pelvis posterior laminectomy and fusion with posterior instrumentation w/ Dr. Stroud. ENT consulted for CT findings of b/l submandibular sialoadenitis. Denies fever, chills, nausea, vomiting, difficulty breathing, chest pain, increased salivation/drooling, sore throat, cough, nasal congestion.  No recent travel or sick contacts.  No recent change in diet.  Known allergy to penicillin.  No recent weight loss or night sweats.        PAST MEDICAL & SURGICAL HISTORY:  H/O seasonal allergies      History of pulmonary embolism  developed after billings abigail surgery,1984 treated with coumadin 3 months, no issues after      Restless leg syndrome      GERD (gastroesophageal reflux disease)      OA (osteoarthritis)      H/O scoliosis      Essential hypertension      Depression      Osteoporosis      2019 novel coronavirus disease (COVID-19)  Dec 2020- hospitalized for 3 days, did not require home O2, denies residual symptoms      Hiatal hernia      History of chronic pain      Peripheral neuropathy      Chronic constipation      Chronic GERD      Closed fracture of thoracic vertebra      MSSA (methicillin susceptible Staphylococcus aureus) infection      SILVANA on CPAP      Paroxysmal atrial fibrillation      PFO (patent foramen ovale)      Hearing loss      Postlaminectomy syndrome      Delayed gastric emptying      Anemia      DM2 (diabetes mellitus, type 2)      S/P repair of paraesophageal hernia  2001      S/P spinal fusion  L4-L5 1966      Scoliosis  s/p placement of billings abigail x 2 1984      S/P spinal surgery  x 2 1985, 1986      Removal of pin, plate, abigail, or screw  1991- removal of billings abigail      S/P hysterectomy  1982      S/P hip replacement  left (2008)      S/P shoulder surgery  right (2012)      S/P dilatation and curettage  1981      H/O arthroscopy of knee  June 2021      S/P cataract surgery      History of bilateral hip replacements      History of repair of hiatal hernia        Allergies    Dilantin (Urticaria)  penicillins (Urticaria)    Intolerances    erythromycin (Stomach Upset; Vomiting; Nausea)    MEDICATIONS  (STANDING):  dexAMETHasone  IVPB 10 milliGRAM(s) IV Intermittent Once    MEDICATIONS  (PRN):      Social History: unknown    Family history: unknown    ROS:   ENT: all negative except as noted in HPI   CV: denies palpitations  Pulm: denies SOB, cough, hemoptysis  GI: denies change in appetite, indigestion, n/v  : denies pertinent urinary symptoms, urgency  Neuro: denies numbness/tingling, loss of sensation  Psych: denies anxiety  MS: denies muscle weakness, instability  Heme: denies easy bruising or bleeding  Endo: denies heat/cold intolerance, excessive sweating  Vascular: denies LE edema    Vital Signs Last 24 Hrs  T(C): 36.4 (28 May 2025 06:54), Max: 36.4 (28 May 2025 00:59)  T(F): 97.6 (28 May 2025 06:54), Max: 97.6 (28 May 2025 06:10)  HR: 68 (28 May 2025 07:23) (68 - 84)  BP: 123/63 (28 May 2025 07:23) (123/63 - 138/83)  BP(mean): --  RR: 18 (28 May 2025 07:23) (18 - 20)  SpO2: 95% (28 May 2025 07:23) (95% - 97%)    Parameters below as of 28 May 2025 07:23  Patient On (Oxygen Delivery Method): room air                              12.6   4.70  )-----------( 192      ( 28 May 2025 03:21 )             40.4    05-28    142  |  106  |  24[H]  ----------------------------<  138[H]  4.1   |  22  |  1.15    Ca    9.5      28 May 2025 03:21    TPro  6.4  /  Alb  4.0  /  TBili  0.3  /  DBili  x   /  AST  32  /  ALT  31  /  AlkPhos  157[H]  05-28       PHYSICAL EXAM:  Gen: NAD, sleepy  Skin: No rashes, bruises, or lesions  Head: Normocephalic, Atraumatic  Face: no erythema, or fluctuance. Parotid glands soft without mass  Eyes: no scleral injection  Nose: Nares bilaterally patent, no discharge  Mouth: No Stridor / Drooling / Trismus.  Mucosa moist, tongue/uvula midline, no tongue or uvula edema. floor of mouth mild edema. no pus expressed from selvin's duct, no erythema.  Neck: submandibular swelling, TTP.  Lymphatic: No lymphadenopathy  Resp: breathing easily, no stridor  CV: no peripheral edema/cyanosis  GI: nondistended   Peripheral vascular: no JVD or edema  Neuro: no facial droop        < from: CT Neck Soft Tissue w/ IV Cont (05.28.25 @ 05:40) >    IMPRESSION:  Bilateral submandibular sialadenitis with adjacent reactive changes. No   drainable fluid collections.    Incidental finding of a right thyroid nodule, not definitely seen on CT   thoracic spine 11/9/2024. Consider thyroid ultrasound on nonemergent   basis.    < end of copied text >   CC: neck swelling    HPI: 74yo woman with history of HTN, SILVANA, A fib on eliquis, OA, presents to ED for acute onset neck swelling x 2 days. Known thoracic lumbar fusion 9/24 c/b sepsis 2/2 paraspinal abscess on cefadroxil daily. Scheduled for T4-Pelvis posterior laminectomy and fusion with posterior instrumentation w/ Dr. Stroud. ENT consulted for CT findings of b/l submandibular sialoadenitis. Denies fever, chills, nausea, vomiting, difficulty breathing, chest pain, increased salivation/drooling, sore throat, cough, nasal congestion.  No recent travel or sick contacts.  No recent change in diet.  Known allergy to penicillin.  No recent weight loss or night sweats.        PAST MEDICAL & SURGICAL HISTORY:  H/O seasonal allergies      History of pulmonary embolism  developed after billings abigail surgery,1984 treated with coumadin 3 months, no issues after      Restless leg syndrome      GERD (gastroesophageal reflux disease)      OA (osteoarthritis)      H/O scoliosis      Essential hypertension      Depression      Osteoporosis      2019 novel coronavirus disease (COVID-19)  Dec 2020- hospitalized for 3 days, did not require home O2, denies residual symptoms      Hiatal hernia      History of chronic pain      Peripheral neuropathy      Chronic constipation      Chronic GERD      Closed fracture of thoracic vertebra      MSSA (methicillin susceptible Staphylococcus aureus) infection      SILVANA on CPAP      Paroxysmal atrial fibrillation      PFO (patent foramen ovale)      Hearing loss      Postlaminectomy syndrome      Delayed gastric emptying      Anemia      DM2 (diabetes mellitus, type 2)      S/P repair of paraesophageal hernia  2001      S/P spinal fusion  L4-L5 1966      Scoliosis  s/p placement of billings abigail x 2 1984      S/P spinal surgery  x 2 1985, 1986      Removal of pin, plate, abigail, or screw  1991- removal of billings abigail      S/P hysterectomy  1982      S/P hip replacement  left (2008)      S/P shoulder surgery  right (2012)      S/P dilatation and curettage  1981      H/O arthroscopy of knee  June 2021      S/P cataract surgery      History of bilateral hip replacements      History of repair of hiatal hernia        Allergies    Dilantin (Urticaria)  penicillins (Urticaria)    Intolerances    erythromycin (Stomach Upset; Vomiting; Nausea)    MEDICATIONS  (STANDING):  dexAMETHasone  IVPB 10 milliGRAM(s) IV Intermittent Once    MEDICATIONS  (PRN):      Social History: unknown    Family history: unknown    ROS:   ENT: all negative except as noted in HPI   CV: denies palpitations  Pulm: denies SOB, cough, hemoptysis  GI: denies change in appetite, indigestion, n/v  : denies pertinent urinary symptoms, urgency  Neuro: denies numbness/tingling, loss of sensation  Psych: denies anxiety  MS: denies muscle weakness, instability  Heme: denies easy bruising or bleeding  Endo: denies heat/cold intolerance, excessive sweating  Vascular: denies LE edema    Vital Signs Last 24 Hrs  T(C): 36.4 (28 May 2025 06:54), Max: 36.4 (28 May 2025 00:59)  T(F): 97.6 (28 May 2025 06:54), Max: 97.6 (28 May 2025 06:10)  HR: 68 (28 May 2025 07:23) (68 - 84)  BP: 123/63 (28 May 2025 07:23) (123/63 - 138/83)  BP(mean): --  RR: 18 (28 May 2025 07:23) (18 - 20)  SpO2: 95% (28 May 2025 07:23) (95% - 97%)    Parameters below as of 28 May 2025 07:23  Patient On (Oxygen Delivery Method): room air                              12.6   4.70  )-----------( 192      ( 28 May 2025 03:21 )             40.4    05-28    142  |  106  |  24[H]  ----------------------------<  138[H]  4.1   |  22  |  1.15    Ca    9.5      28 May 2025 03:21    TPro  6.4  /  Alb  4.0  /  TBili  0.3  /  DBili  x   /  AST  32  /  ALT  31  /  AlkPhos  157[H]  05-28       PHYSICAL EXAM:  Gen: NAD, sleepy  Skin: No rashes, bruises, or lesions  Head: Normocephalic, Atraumatic  Face: no erythema, or fluctuance. Parotid glands soft without mass  Eyes: no scleral injection  Nose: Nares bilaterally patent, no discharge  Mouth: No Stridor / Drooling / Trismus.  Mucosa moist, tongue/uvula midline, no tongue or uvula edema. no pus expressed from selvin's duct, no erythema.  Neck: minimal submandibular swelling, TTP.  Lymphatic: No lymphadenopathy  Resp: breathing easily, no stridor  CV: no peripheral edema/cyanosis  GI: nondistended   Peripheral vascular: no JVD or edema  Neuro: no facial droop        < from: CT Neck Soft Tissue w/ IV Cont (05.28.25 @ 05:40) >    IMPRESSION:  Bilateral submandibular sialadenitis with adjacent reactive changes. No   drainable fluid collections.    Incidental finding of a right thyroid nodule, not definitely seen on CT   thoracic spine 11/9/2024. Consider thyroid ultrasound on nonemergent   basis.    < end of copied text >

## 2025-05-28 NOTE — ED CDU PROVIDER INITIAL DAY NOTE - ATTENDING APP SHARED VISIT CONTRIBUTION OF CARE
ATTENDING, Umang MADDEN: I have personally performed a face to face diagnostic evaluation on this patient.  I have reviewed the ACP note and agree with the history, exam, and plan of care, except as noted here. Progress notes and further evaluation to be reviewed by observation and discharging attending.

## 2025-05-28 NOTE — ED PROVIDER NOTE - PROGRESS NOTE DETAILS
Colten PGY4 Fellow: Signed out by Dr. Mcdaniel as a 75 year old female presenting for submandibular swelling that started suddenly tonight, no respiratory symptoms. CT showing bilateral sialoadenitis, pending ENT consult. Colten PGY4 Fellow: ENT saw patient, recommend observation over course of day to ensure no progression of swelling, decadron 8 hours after prednisone and dose of clindamycin. Will place in observation. RODDY Heck: Patient was endorsed to me by the out-going physician at the usual hour of signout. I have participated in the care of the patient as appropriate, including providing the appropriate level of supervision for the fellow/resident/student/non-physician provider in enacting the ongoing plan as reviewed with the out-going physician.

## 2025-05-28 NOTE — ED PROVIDER NOTE - ATTENDING CONTRIBUTION TO CARE
Patient is a 75-year-old female with a history of hypertension, atrial fibrillation on Eliquis, obstructive sleep apnea, history of spinal surgery on chronic antibiotics cefadroxil for chronic infection, here for evaluation of acute onset of neck swelling around 10 PM.  Patient reports she had leftover Chinese food around 9 PM.  She had acute onset of neck swelling around 10 PM and took 25 mg of Benadryl at 10:30 PM.  She denies any throat tightness, tongue swelling.  No rash.  She denies any nausea, vomiting.  Patient reports that she is scheduled for spinal fusion surgery next week on 6/4 with Dr. Stroud.  Patient denies any prior similar reactions.  She denies any recent fevers, chills.      VS noted  Gen. no acute distress, Non toxic   HEENT: EOMI, mmm, anterior neck is swollen, slightly firm to touch, airway is intact, no stridor, maintaining secretions  Lungs: CTAB/L no C/ W /R   CVS: RRR   Abd; Soft non tender, non distended   Ext: no edema  Skin: no rash  Neuro AAOx3 non focal clear speech  a/p:  anterior neck swelling–given acuity, concern for allergic reaction.  Patient took Benadryl prior to coming in and reports improvement.  Plan for labs, CT.  Will give p.o. prednisone as patient is pending ultrasound IV at this time.  Brianna Mcdaniel MD

## 2025-05-28 NOTE — ED CDU PROVIDER INITIAL DAY NOTE - OBJECTIVE STATEMENT
76yo woman with history of HTN, SILVANA, A fib on eliquis, OA, presents to ED for acute onset painless neck swelling x 1 day. Known thoracic lumbar fusion 9/24 c/b sepsis 2/2 paraspinal abscess on cefadroxil daily. Scheduled for T4-Pelvis posterior laminectomy and fusion with posterior instrumentation w/ Dr. Stroud. Denies fever, chills, nausea, vomiting, difficulty breathing, chest pain, increased salivation/drooling, sore throat, cough, nasal congestion.  No recent travel or sick contacts.  No recent change in diet.  Known allergy to penicillin.  No recent weight loss or night sweats.    In ED ED course: Labs not acutely actionable.  CT neck performed which showed bilateral submandibular sialadenitis with reactive changes.  No drainable fluid collections noted.  Patient seen by ENT team who recommended IV clindamycin and Decadron.  Patient placed in CDU for above.  Case discussed with ED attending.

## 2025-05-28 NOTE — ED ADULT NURSE NOTE - NS_SISCREENINGSR_GEN_ALL_ED
My findings and recommendations TODAY are based on the patient's symptoms, exam, diagnostic testing and records. Negative

## 2025-05-28 NOTE — ED ADULT NURSE REASSESSMENT NOTE - NS ED NURSE REASSESS COMMENT FT1
Pt received from ARUN Alcala. Pt oriented to CDU & plan of care was discussed. Pt A&O x 4. 1xassist with wheelchair/rollator. Pt in CDU for IV antibiotic, pain control, vital signs q4h . Pt denies any neck pain/throat pain. Patient does endorse back pain. V/S stable, pt afebrile,  IV in place, patent and free of signs of infiltration. Pt resting in bed. Safety & comfort measures maintained. Call bell in reach. Care continues.

## 2025-05-28 NOTE — ED ADULT TRIAGE NOTE - CHIEF COMPLAINT QUOTE
BL "gland" swelling since 22:00 on 5/27/25. New denture placed on 5/27/25, no "work done". Endorses taking benadryl at 22:00. Denies pruritis. Denies difficulty swallowing and tongue swelling. Tolerating secretions and speaking in full sentences. On PO cefadroxil  or infection of spinal "hardware".

## 2025-05-28 NOTE — ED CDU PROVIDER INITIAL DAY NOTE - PHYSICAL EXAMINATION
Gen: Well appearing elderly feamle, AAO x 3, NAD  Skin: No rashes or lesions  HEENT: NC/AT. +inframandibular swelling b/l. PERRLA, EOMI, MMM  Resp: unlabored CTAB  Cardiac: rrr s1s2, no murmurs, rubs or gallops  GI: ND, +BS, Soft, NT  Ext: no pedal edema, FROM in all extremities  Neuro: no focal deficits

## 2025-05-28 NOTE — ED ADULT NURSE NOTE - NSFALLHARMRISKINTERV_ED_ALL_ED

## 2025-05-28 NOTE — ED CDU PROVIDER INITIAL DAY NOTE - NSICDXPASTSURGICALHX_GEN_ALL_CORE_FT
PAST SURGICAL HISTORY:  H/O arthroscopy of knee June 2021    History of bilateral hip replacements     History of repair of hiatal hernia     Removal of pin, plate, abigail, or screw 1991- removal of billings abigail    S/P cataract surgery     S/P dilatation and curettage 1981    S/P hip replacement left (2008)    S/P hysterectomy 1982    S/P repair of paraesophageal hernia 2001    S/P shoulder surgery right (2012)    S/P spinal fusion L4-L5 1966    S/P spinal surgery x 2 1985, 1986    Scoliosis s/p placement of billings abigail x 2 1984    
Yes

## 2025-05-28 NOTE — ED ADULT TRIAGE NOTE - NS ED TRIAGE AVPU SCALE
01/17/24 2254   Treatment   Treatment Type SLED   Treatment Status Restart   Dialysis Machine Number [id number]   Dialyzer Time (hours) 4.05   BVP (Liters) 33.7 L   Solutions Labeled and Current  Yes   Access Tunneled Cath;Right;IJ   Catheter Dressing Intact  Yes   Alarms Engaged Yes   CRRT Comments CRRT restarted post clotting.        Alert-The patient is alert, awake and responds to voice. The patient is oriented to time, place, and person. The triage nurse is able to obtain subjective information.

## 2025-05-28 NOTE — ED PROVIDER NOTE - OBJECTIVE STATEMENT
74yo woman with history of HTN, SILVANA, A fib on eliquis, OA, presents to ED for acute onset painless neck swelling x 1 day. Known thoracic lumbar fusion 9/24 c/b sepsis 2/2 paraspinal abscess on cefadroxil daily. Scheduled for T4-Pelvis posterior laminectomy and fusion with posterior instrumentation w/ Dr. Stroud. Denies fever, chills, nausea, vomiting, difficulty breathing, chest pain, increased salivation/drooling, sore throat, cough, nasal congestion.  No recent travel or sick contacts.  No recent change in diet.  Known allergy to penicillin.  No recent weight loss or night sweats.

## 2025-05-28 NOTE — ED PROVIDER NOTE - CLINICAL SUMMARY MEDICAL DECISION MAKING FREE TEXT BOX
Afebrile hemodynamically stable female presents to ED for acute onset painless neck swelling x 1 day.  Physical exam notable for significant inframandibular swelling with no appreciable lymphadenopathy.  Clear oropharynx no tonsillar swelling/exudates.  Full ROM of cervical neck.  Ordered basic labs, TSH and CT neck soft tissue to rule out abscess versus malignancy versus thyroid disease versus infected etiology.

## 2025-05-28 NOTE — CONSULT NOTE ADULT - PROBLEM SELECTOR RECOMMENDATION 9
iv clindamycin  decadron 10mg q8hrs x 24hrs  CDU  Pain control  Maintain hydration and good oral hygiene  Warm compress  Massage the gland and milk the duct  Sialagogues like lemon juice and hard candies (lemon drops)  ENT will continue to follow. Call with questions or concerns x [phone] iv clindamycin, d/c with po clindamycin  decadron 10mg q8hrs x 24hrs  CDU  Pain control  Maintain hydration and good oral hygiene  Warm compress  Massage the gland and milk the duct  Sialagogues like lemon juice and hard candies (lemon drops)  Follow up outpatient ENT Dr. Murillo, Dr. Mensah, Dr. Ramos, Dr. Simpson. Call [phone].

## 2025-05-28 NOTE — ED ADULT NURSE NOTE - OBJECTIVE STATEMENT
75 y.o female c/o neck swelling since last night after dinner. Denies difficulty swallowing/breathing, CP, SOB, NVD, fevers, rashes, cough, congestion. Pt speaking in full sentences. PMH HTN, SILVANA, Afib on Eliquis

## 2025-05-28 NOTE — ED PROVIDER NOTE - PHYSICAL EXAMINATION
Gen: NAD, non-toxic appearing  Head: normal appearing  HEENT: normal conjunctiva, oral mucosa moist, significant inframandibular swelling with no appreciable lymphadenopathy.  Clear oropharynx no tonsillar swelling/exudates.  Full ROM of cervical neck.   Lung: no respiratory distress, speaking in full sentences, CTA b/l     CV: regular rate and rhythm, no murmurs  Abd: soft, non distended, non tender   MSK: no visible deformities  Neuro: No focal deficits, AAOx3  Skin: Warm  Psych: normal affect

## 2025-05-29 VITALS
RESPIRATION RATE: 18 BRPM | TEMPERATURE: 98 F | DIASTOLIC BLOOD PRESSURE: 75 MMHG | SYSTOLIC BLOOD PRESSURE: 135 MMHG | OXYGEN SATURATION: 96 % | HEART RATE: 67 BPM

## 2025-05-29 LAB
ALBUMIN SERPL ELPH-MCNC: 3.9 G/DL — SIGNIFICANT CHANGE UP (ref 3.3–5)
ALP SERPL-CCNC: 157 U/L — HIGH (ref 40–120)
ALT FLD-CCNC: 26 U/L — SIGNIFICANT CHANGE UP (ref 10–45)
ANION GAP SERPL CALC-SCNC: 14 MMOL/L — SIGNIFICANT CHANGE UP (ref 5–17)
AST SERPL-CCNC: 19 U/L — SIGNIFICANT CHANGE UP (ref 10–40)
BASOPHILS # BLD AUTO: 0.01 K/UL — SIGNIFICANT CHANGE UP (ref 0–0.2)
BASOPHILS NFR BLD AUTO: 0.1 % — SIGNIFICANT CHANGE UP (ref 0–2)
BILIRUB SERPL-MCNC: 0.3 MG/DL — SIGNIFICANT CHANGE UP (ref 0.2–1.2)
BUN SERPL-MCNC: 26 MG/DL — HIGH (ref 7–23)
CALCIUM SERPL-MCNC: 9.7 MG/DL — SIGNIFICANT CHANGE UP (ref 8.4–10.5)
CHLORIDE SERPL-SCNC: 105 MMOL/L — SIGNIFICANT CHANGE UP (ref 96–108)
CO2 SERPL-SCNC: 20 MMOL/L — LOW (ref 22–31)
CREAT SERPL-MCNC: 0.69 MG/DL — SIGNIFICANT CHANGE UP (ref 0.5–1.3)
EGFR: 89 ML/MIN/1.73M2 — SIGNIFICANT CHANGE UP
EGFR: 89 ML/MIN/1.73M2 — SIGNIFICANT CHANGE UP
EOSINOPHIL # BLD AUTO: 0 K/UL — SIGNIFICANT CHANGE UP (ref 0–0.5)
EOSINOPHIL NFR BLD AUTO: 0 % — SIGNIFICANT CHANGE UP (ref 0–6)
GLUCOSE SERPL-MCNC: 194 MG/DL — HIGH (ref 70–99)
HCT VFR BLD CALC: 37.6 % — SIGNIFICANT CHANGE UP (ref 34.5–45)
HGB BLD-MCNC: 12.1 G/DL — SIGNIFICANT CHANGE UP (ref 11.5–15.5)
IMM GRANULOCYTES NFR BLD AUTO: 0.7 % — SIGNIFICANT CHANGE UP (ref 0–0.9)
LYMPHOCYTES # BLD AUTO: 0.94 K/UL — LOW (ref 1–3.3)
LYMPHOCYTES # BLD AUTO: 12.9 % — LOW (ref 13–44)
MCHC RBC-ENTMCNC: 27.5 PG — SIGNIFICANT CHANGE UP (ref 27–34)
MCHC RBC-ENTMCNC: 32.2 G/DL — SIGNIFICANT CHANGE UP (ref 32–36)
MCV RBC AUTO: 85.5 FL — SIGNIFICANT CHANGE UP (ref 80–100)
MONOCYTES # BLD AUTO: 0.05 K/UL — SIGNIFICANT CHANGE UP (ref 0–0.9)
MONOCYTES NFR BLD AUTO: 0.7 % — LOW (ref 2–14)
NEUTROPHILS # BLD AUTO: 6.22 K/UL — SIGNIFICANT CHANGE UP (ref 1.8–7.4)
NEUTROPHILS NFR BLD AUTO: 85.6 % — HIGH (ref 43–77)
NRBC BLD AUTO-RTO: 0 /100 WBCS — SIGNIFICANT CHANGE UP (ref 0–0)
PLATELET # BLD AUTO: 214 K/UL — SIGNIFICANT CHANGE UP (ref 150–400)
POTASSIUM SERPL-MCNC: 4 MMOL/L — SIGNIFICANT CHANGE UP (ref 3.5–5.3)
POTASSIUM SERPL-SCNC: 4 MMOL/L — SIGNIFICANT CHANGE UP (ref 3.5–5.3)
PROT SERPL-MCNC: 6.7 G/DL — SIGNIFICANT CHANGE UP (ref 6–8.3)
RBC # BLD: 4.4 M/UL — SIGNIFICANT CHANGE UP (ref 3.8–5.2)
RBC # FLD: 15.5 % — HIGH (ref 10.3–14.5)
SODIUM SERPL-SCNC: 139 MMOL/L — SIGNIFICANT CHANGE UP (ref 135–145)
WBC # BLD: 7.27 K/UL — SIGNIFICANT CHANGE UP (ref 3.8–10.5)
WBC # FLD AUTO: 7.27 K/UL — SIGNIFICANT CHANGE UP (ref 3.8–10.5)

## 2025-05-29 PROCEDURE — 96376 TX/PRO/DX INJ SAME DRUG ADON: CPT | Mod: XU

## 2025-05-29 PROCEDURE — 80053 COMPREHEN METABOLIC PANEL: CPT

## 2025-05-29 PROCEDURE — 84436 ASSAY OF TOTAL THYROXINE: CPT

## 2025-05-29 PROCEDURE — 36415 COLL VENOUS BLD VENIPUNCTURE: CPT

## 2025-05-29 PROCEDURE — 99239 HOSP IP/OBS DSCHRG MGMT >30: CPT

## 2025-05-29 PROCEDURE — 70491 CT SOFT TISSUE NECK W/DYE: CPT

## 2025-05-29 PROCEDURE — 96365 THER/PROPH/DIAG IV INF INIT: CPT | Mod: XU

## 2025-05-29 PROCEDURE — G0378: CPT

## 2025-05-29 PROCEDURE — 96375 TX/PRO/DX INJ NEW DRUG ADDON: CPT | Mod: XU

## 2025-05-29 PROCEDURE — 99284 EMERGENCY DEPT VISIT MOD MDM: CPT | Mod: 25

## 2025-05-29 PROCEDURE — 84480 ASSAY TRIIODOTHYRONINE (T3): CPT

## 2025-05-29 PROCEDURE — 85025 COMPLETE CBC W/AUTO DIFF WBC: CPT

## 2025-05-29 PROCEDURE — 84443 ASSAY THYROID STIM HORMONE: CPT

## 2025-05-29 RX ORDER — METOPROLOL SUCCINATE 50 MG/1
100 TABLET, EXTENDED RELEASE ORAL
Refills: 0 | Status: ACTIVE | OUTPATIENT
Start: 2025-05-29 | End: 2026-04-27

## 2025-05-29 RX ORDER — PRAMIPEXOLE DIHYDROCHLORIDE 1 MG/1
3.75 TABLET ORAL DAILY
Refills: 0 | Status: DISCONTINUED | OUTPATIENT
Start: 2025-05-29 | End: 2025-05-29

## 2025-05-29 RX ORDER — CLINDAMYCIN PHOSPHATE 150 MG/ML
1 VIAL (ML) INJECTION
Qty: 40 | Refills: 0
Start: 2025-05-29 | End: 2025-06-07

## 2025-05-29 RX ORDER — METHYLPREDNISOLONE ACETATE 80 MG/ML
1 INJECTION, SUSPENSION INTRA-ARTICULAR; INTRALESIONAL; INTRAMUSCULAR; SOFT TISSUE
Qty: 1 | Refills: 0
Start: 2025-05-29

## 2025-05-29 RX ORDER — HYDROMORPHONE/SOD CHLOR,ISO/PF 2 MG/10 ML
4 SYRINGE (ML) INJECTION ONCE
Refills: 0 | Status: DISCONTINUED | OUTPATIENT
Start: 2025-05-29 | End: 2025-05-29

## 2025-05-29 RX ORDER — MELATONIN 5 MG
5 TABLET ORAL ONCE
Refills: 0 | Status: COMPLETED | OUTPATIENT
Start: 2025-05-29 | End: 2025-05-29

## 2025-05-29 RX ADMIN — Medication 5 MILLIGRAM(S): at 01:20

## 2025-05-29 RX ADMIN — Medication 600 MILLIGRAM(S): at 02:06

## 2025-05-29 RX ADMIN — CYCLOBENZAPRINE HYDROCHLORIDE 10 MILLIGRAM(S): 15 CAPSULE, EXTENDED RELEASE ORAL at 06:46

## 2025-05-29 RX ADMIN — METOPROLOL SUCCINATE 100 MILLIGRAM(S): 50 TABLET, EXTENDED RELEASE ORAL at 10:46

## 2025-05-29 RX ADMIN — DEXAMETHASONE 100 MILLIGRAM(S): 0.5 TABLET ORAL at 04:12

## 2025-05-29 RX ADMIN — PREGABALIN 100 MILLIGRAM(S): 75 CAPSULE ORAL at 06:46

## 2025-05-29 RX ADMIN — Medication 4 MILLIGRAM(S): at 15:28

## 2025-05-29 RX ADMIN — CELECOXIB 100 MILLIGRAM(S): 50 CAPSULE ORAL at 06:46

## 2025-05-29 RX ADMIN — Medication 100 MILLIGRAM(S): at 08:54

## 2025-05-29 RX ADMIN — Medication 100 MILLIGRAM(S): at 01:20

## 2025-05-29 NOTE — ED CDU PROVIDER DISPOSITION NOTE - PATIENT PORTAL LINK FT
You can access the FollowMyHealth Patient Portal offered by [facility] by registering at the following website: [email or web address]. By joining [facility]’s FollowMyHealth portal, you will also be able to view your health information using other applications (apps) compatible with our system.

## 2025-05-29 NOTE — PROGRESS NOTE ADULT - PROBLEM SELECTOR PLAN 1
d/c with po clindamycin  Pain control  Maintain hydration and good oral hygiene  Warm compress  Massage the gland and milk the duct  Sialagogues like lemon juice and hard candies (lemon drops)  Follow up outpatient ENT Dr. Murillo, Dr. Mensah, Dr. Ramos, Dr. Simpson. Call [phone].  Call with questions or concerns [phone]

## 2025-05-29 NOTE — PROGRESS NOTE ADULT - SUBJECTIVE AND OBJECTIVE BOX
CC: b/l submandibular sialoadenitis    HPI: 76yo woman with history of HTN, SILVANA, A fib on eliquis, OA, presents to ED for acute onset neck swelling x 2 days. Known thoracic lumbar fusion 9/24 c/b sepsis 2/2 paraspinal abscess on cefadroxil daily. Scheduled for T4-Pelvis posterior laminectomy and fusion with posterior instrumentation w/ Dr. Stroud. ENT consulted for CT findings of b/l submandibular sialoadenitis. On initial exam, minimal submandibular swelling, mildly tender to palpation, no pus expressed from selvin's duct, no floor of mouth edema.    Today pt states she feels much better and her neck swelling has improved. Denies fever, N/V, dysphagia, odynophagia, dysphonia, SOB, dyspnea, changes in voice or inability to tolerate secretions.         PAST MEDICAL & SURGICAL HISTORY:  H/O seasonal allergies      History of pulmonary embolism  developed after billings abigail surgery,1984 treated with coumadin 3 months, no issues after      Restless leg syndrome      GERD (gastroesophageal reflux disease)      OA (osteoarthritis)      H/O scoliosis      Essential hypertension      Depression      Osteoporosis      2019 novel coronavirus disease (COVID-19)  Dec 2020- hospitalized for 3 days, did not require home O2, denies residual symptoms      Hiatal hernia      History of chronic pain      Peripheral neuropathy      Chronic constipation      Chronic GERD      Closed fracture of thoracic vertebra      MSSA (methicillin susceptible Staphylococcus aureus) infection      SILVANA on CPAP      Paroxysmal atrial fibrillation      PFO (patent foramen ovale)      Hearing loss      Postlaminectomy syndrome      Delayed gastric emptying      Anemia      DM2 (diabetes mellitus, type 2)      S/P repair of paraesophageal hernia  2001      S/P spinal fusion  L4-L5 1966      Scoliosis  s/p placement of billings abigail x 2 1984      S/P spinal surgery  x 2 1985, 1986      Removal of pin, plate, abigail, or screw  1991- removal of billings abigail      S/P hysterectomy  1982      S/P hip replacement  left (2008)      S/P shoulder surgery  right (2012)      S/P dilatation and curettage  1981      H/O arthroscopy of knee  June 2021      S/P cataract surgery      History of bilateral hip replacements      History of repair of hiatal hernia        Allergies    Dilantin (Urticaria)  penicillins (Urticaria)    Intolerances    erythromycin (Stomach Upset; Vomiting; Nausea)    MEDICATIONS  (STANDING):  atorvastatin 40 milliGRAM(s) Oral at bedtime  celecoxib 100 milliGRAM(s) Oral two times a day  chlorthalidone 25 milliGRAM(s) Oral daily  clindamycin IVPB 600 milliGRAM(s) IV Intermittent every 8 hours  metoprolol tartrate 100 milliGRAM(s) Oral two times a day  pregabalin 100 milliGRAM(s) Oral two times a day    MEDICATIONS  (PRN):  cyclobenzaprine 10 milliGRAM(s) Oral two times a day PRN Muscle Spasm        Social History: see consult    Family history: see consult      ROS:   ENT: all negative except as noted in HPI   Pulm: denies SOB, cough, hemoptysis  Neuro: denies numbness/tingling, loss of sensation  Endo: denies heat/cold intolerance, excessive sweating      Vital Signs Last 24 Hrs  T(C): 36.6 (29 May 2025 15:09), Max: 36.8 (29 May 2025 00:56)  T(F): 97.9 (29 May 2025 15:09), Max: 98.3 (29 May 2025 00:56)  HR: 67 (29 May 2025 15:09) (67 - 92)  BP: 135/75 (29 May 2025 15:09) (115/68 - 160/79)  BP(mean): --  RR: 18 (29 May 2025 15:09) (17 - 19)  SpO2: 96% (29 May 2025 15:09) (94% - 97%)    Parameters below as of 29 May 2025 15:09  Patient On (Oxygen Delivery Method): room air                              12.1   7.27  )-----------( 214      ( 29 May 2025 06:33 )             37.6    05-29    139  |  105  |  26[H]  ----------------------------<  194[H]  4.0   |  20[L]  |  0.69    Ca    9.7      29 May 2025 06:33    TPro  6.7  /  Alb  3.9  /  TBili  0.3  /  DBili  x   /  AST  19  /  ALT  26  /  AlkPhos  157[H]  05-29           PHYSICAL EXAM:  Gen: NAD, sleepy  Skin: No rashes, bruises, or lesions  Head: Normocephalic, Atraumatic  Face: no erythema, or fluctuance. Parotid glands soft without mass  Eyes: no scleral injection  Nose: Nares bilaterally patent, no discharge  Mouth: No Stridor / Drooling / Trismus.  Mucosa moist, tongue/uvula midline, no tongue or uvula edema. no pus expressed from selvin's duct, no erythema or floor of mouth edema.  Neck: minimal submandibular swelling, mildly TTP.  Lymphatic: No lymphadenopathy  Resp: breathing easily, no stridor  CV: no peripheral edema/cyanosis  GI: nondistended   Peripheral vascular: no JVD or edema  Neuro: no facial droop

## 2025-05-29 NOTE — ED CDU PROVIDER SUBSEQUENT DAY NOTE - HISTORY
No interval changes since initial CDU provider note. Pt without new complaint. NAD VSS. - LIZZY Lang

## 2025-05-29 NOTE — ED CDU PROVIDER SUBSEQUENT DAY NOTE - PROGRESS NOTE DETAILS
Patient seen at bedside with Dr. Segal this morning, resting comfortably, NAD. Pt tolerating PO. Pt reports improvement of neck swelling. Patient seen at bedside with Dr. Segal this morning, resting comfortably, NAD. Pt tolerating PO. Pt reports improvement of neck swelling. VSS. Plan to continue decadron and clinda per ENT recs. Pt also reports she is taking cefadroxil for spine surgery ppx, and asking if she should continue both clinda and prior abx, will d/w NSGY. Spoke with NSGY, resident reports he d/w Dr. Stroud, pt to take both antibiotics, pt made aware. Pt remains feeling well. Pt re-evaluated by ENT in CDU, cleared for d/c on PO abx and steroids to f/u in office. Pt reports she is aware of thyroid nodules noted on CT, advised f/u with PCP. Pt is stable for d/c, has family here to take her home. D/w Dr. Segal. Spoke with NSGY, resident reports he d/w Dr. Stroud, pt to take both antibiotics, pt made aware. Pt remains feeling well. Pt re-evaluated by ENT in CDU, cleared for d/c on PO abx and steroids to f/u in office. Pt reports she is aware of thyroid nodule noted on CT, advised f/u with PCP. Pt is stable for d/c, has family here to take her home. D/w Dr. Segal.

## 2025-05-29 NOTE — ED CDU PROVIDER SUBSEQUENT DAY NOTE - PHYSICAL EXAMINATION
Gen: Well appearing elderly feamle, AAO x 3, NAD  	HEENT: NC/AT. +inframandibular swelling b/l. MMM  	Resp: unlabored CTAB  	Cardiac: rrr s1s2  	GI: Soft, NT  	Ext: no pedal edema, moving all extremities   Skin: No visible rashes  Psych: Appropriate mood and affect

## 2025-05-29 NOTE — ED CDU PROVIDER DISPOSITION NOTE - PROVIDER TOKENS
PROVIDER:[TOKEN:[[id number]],FOLLOWUP:[1-3 Days]],PROVIDER:[TOKEN:[[id number]],FOLLOWUP:[1-3 Days]]

## 2025-05-29 NOTE — ED CDU PROVIDER DISPOSITION NOTE - CARE PROVIDER_API CALL
Opal Murillo  Otolaryngology  [address] [place] [address], [address] [address]  Phone: [phone]  Fax: [phone]  Follow Up Time: 1-3 Days    Surendra Mensah  Otolaryngology  [address] [place] [address], [facility] [address]  [place] [address]  Phone: [phone]  Fax: [phone]  Follow Up Time: 1-3 Days

## 2025-05-29 NOTE — ED ADULT NURSE REASSESSMENT NOTE - NS ED NURSE REASSESS COMMENT FT1
Pt received from ARUN Cowan. Pt oriented to CDU & plan of care was discussed. Pt A&O x 4. Pt in CDU for IV clinda, IV decadron, ENT following . Pt denies any chills, fever, chest pain, SOB, dizziness or palpitations. V/S stable, pt afebrile,  IV in place, patent and free of signs of infiltration. Pt resting in bed. Safety & comfort measures maintained. Call bell in reach. Will continue to monitor.

## 2025-05-29 NOTE — ED CDU PROVIDER DISPOSITION NOTE - CLINICAL COURSE
76yo woman with history of HTN, SILVAAN, A fib on eliquis, OA, presents to ED for acute onset painless neck swelling x 1 day. Known thoracic lumbar fusion 9/24 c/b sepsis 2/2 paraspinal abscess on cefadroxil daily. Scheduled for T4-Pelvis posterior laminectomy and fusion with posterior instrumentation w/ Dr. Stroud. Denies fever, chills, nausea, vomiting, difficulty breathing, chest pain, increased salivation/drooling, sore throat, cough, nasal congestion.  No recent travel or sick contacts.  No recent change in diet.  Known allergy to penicillin.  No recent weight loss or night sweats.    	In ED ED course: Labs not acutely actionable.  CT neck performed which showed bilateral submandibular sialadenitis with reactive changes.  No drainable fluid collections noted.  Patient seen by ENT team who recommended IV clindamycin and Decadron.  Patient placed in CDU for above.  Case discussed with ED attending. 74yo woman with history of HTN, SILVANA, A fib on eliquis, OA, presents to ED for acute onset painless neck swelling x 1 day. Known thoracic lumbar fusion 9/24 c/b sepsis 2/2 paraspinal abscess on cefadroxil daily. Scheduled for T4-Pelvis posterior laminectomy and fusion with posterior instrumentation w/ Dr. Stroud. Denies fever, chills, nausea, vomiting, difficulty breathing, chest pain, increased salivation/drooling, sore throat, cough, nasal congestion.  No recent travel or sick contacts.  No recent change in diet.  Known allergy to penicillin.  No recent weight loss or night sweats.    	In ED ED course: Labs not acutely actionable.  CT neck performed which showed bilateral submandibular sialadenitis with reactive changes.  No drainable fluid collections noted.  Patient seen by ENT team who recommended IV clindamycin and Decadron.  Patient placed in CDU for above.  Case discussed with ED attending.  In CDU, patient feeling much better. Neck swelling improved. Pt tolerating PO. Pt cleared by ENT for outpatient f/u.

## 2025-05-29 NOTE — ED CDU PROVIDER DISPOSITION NOTE - ATTENDING APP SHARED VISIT CONTRIBUTION OF CARE
----- Message from Mervat Boston sent at 11/6/2023  8:13 AM CST -----  Regarding: Update /improved  Type:  Needs Medical Advice    Who Called: Pt      Would the patient rather a call back or a response via [facility]? Callback    Best Call Back Number: [phone]    Additional Information: Pt sts on Nov ,  told him to stop taking a few of his meds he is take and call with the update today, Pt have stop taking meds and his condition as improved and is doing a lot better. Pt would like a call back. Please advise ------------------Thank you      
Handled in another call   
------------ATTENDING NOTE------------  swelling significantly improved, well appearing, ABCs intact, tolerating PO, cleared by labs / imaging and ENT consult for dc w/ close outpt fu, coordinating care w/ spine team for upcoming surgery, nml VS at dc, in depth dw all about ddx, tx, kruger, continued close outpt fu.  - Lj Segal MD   ------------------------------------------------

## 2025-05-29 NOTE — ED CDU PROVIDER DISPOSITION NOTE - CARE PROVIDERS DIRECT ADDRESSES
,[email or web address]

## 2025-05-29 NOTE — PROGRESS NOTE ADULT - ASSESSMENT
76yo woman with history of HTN, SILVANA, A fib on eliquis, OA, presents to ED for acute onset neck swelling x 2 days. Known thoracic lumbar fusion 9/24 c/b sepsis 2/2 paraspinal abscess on cefadroxil daily. Scheduled for T4-Pelvis posterior laminectomy and fusion with posterior instrumentation w/ Dr. Stroud. ENT consulted for CT findings of b/l submandibular sialoadenitis. On initial exam, minimal submandibular swelling, mildly tender to palpation, no pus expressed from selvin's duct, no floor of mouth edema. s/p decadron 10mg q8 hrs x 24 hrs    Today pt states she feels much better and her neck swelling has improved. On exam, minimal submandibular swelling, mildly tender to palpation, improved from prior exam. no pus expressed from selvin's duct, no floor of mouth edema. WBC 7 [place] recommended starting [address] [facility], 

## 2025-05-29 NOTE — ED CDU PROVIDER DISPOSITION NOTE - NSFOLLOWUPINSTRUCTIONS_ED_ALL_ED_FT
Hydrate.     **ABX    Follow up outpatient ENT Dr. Murillo, Dr. Mensah, Dr. Ramos, Dr. Simpson. Call [phone].    Please return to the Emergency Department with new, worsening, or concerning symptoms, such as:  -Shortness of breath or trouble breathing  -Pressure, pain, tightness in chest  -Facial drooping, arm weakness, or speech difficulty   -Head injury or loss of consciousness   -Nonstop bleeding or an open wound     *More detailed information regarding your visit and discharge can be found by reviewing this packet Stay well hydrated.   Take antibiotics and steroid pack as prescribed.     Apply warm compresses for comfort as needed. Massage the gland and milk the duct.  Use sialagogues like lemon juice and hard candies (lemon drops)    Follow up outpatient ENT Dr. Murillo, Dr. Mensah, Dr. Ramos, Dr. Simpson. Call [phone].    * Please follow up with your PCP upon discharge,   and follow up Incidental CT scan findings of: right thyroid nodule --> recommend thyroid ultrasound on nonemergent basis.    Please return to the Emergency Department with new, worsening, or concerning symptoms such as fevers, worsening pain or swelling, trouble breathing or swallowing, or any other concerns. Stay well hydrated.   Take antibiotics and steroid pack as prescribed.     Apply warm compresses for comfort as needed. Massage the gland and milk the duct.  Use sialagogues like lemon juice and hard candies (lemon drops)    Follow up outpatient ENT Dr. Murillo, Dr. Mensah, Dr. Ramos, Dr. Simpson. Call [phone].    * Please follow up with your PCP upon discharge,   and follow up Incidental CT scan findings of: right thyroid nodule --> recommend thyroid ultrasound on nonemergent basis.    Please return to the Emergency Department with new, worsening, or concerning symptoms such as fevers, worsening pain or swelling, trouble breathing or swallowing, or any other concerns.    Opal Murillo  Otolaryngology  [address] [place] [address], [address]  [place] [address]  Phone: [phone]

## 2025-05-30 ENCOUNTER — APPOINTMENT (OUTPATIENT)
Dept: BARIATRICS/WEIGHT MGMT | Facility: CLINIC | Age: 75
End: 2025-05-30

## 2025-05-30 ENCOUNTER — NON-APPOINTMENT (OUTPATIENT)
Age: 75
End: 2025-05-30

## 2025-05-30 RX ORDER — PANTOPRAZOLE 40 MG/1
40 TABLET, DELAYED RELEASE ORAL DAILY
Qty: 90 | Refills: 3 | Status: ACTIVE | COMMUNITY

## 2025-06-03 NOTE — ASU PATIENT PROFILE, ADULT - FALL HARM RISK - HARM RISK INTERVENTIONS

## 2025-06-03 NOTE — ASU PATIENT PROFILE, ADULT - REASON FOR ADMISSION, PROFILE
take out hardware - wash and fuse rest of the spine take out hardware - wash and fuse rest of the spine. Rebuilding spine from T 2 down

## 2025-06-03 NOTE — ASU PATIENT PROFILE, ADULT - MUTUALITY COMMENT, PROFILE
No-Patient/Caregiver offered and refused free interpretation services. n/a pt will speak with the surgeon and the  anesthesiologist preop

## 2025-06-03 NOTE — ASU PATIENT PROFILE, ADULT - ABILITY TO HEAR (WITH HEARING AID OR HEARING APPLIANCE IF NORMALLY USED):
walker/Adequate: hears normal conversation without difficulty Adequate: hears normal conversation without difficulty

## 2025-06-04 ENCOUNTER — INPATIENT (INPATIENT)
Facility: HOSPITAL | Age: 75
LOS: 12 days | Discharge: NURSING, REHAB OR LONG-TERM CARE | End: 2025-06-17
Attending: NEUROLOGICAL SURGERY | Admitting: NEUROLOGICAL SURGERY
Payer: MEDICARE

## 2025-06-04 ENCOUNTER — APPOINTMENT (OUTPATIENT)
Dept: NEUROSURGERY | Facility: HOSPITAL | Age: 75
End: 2025-06-04

## 2025-06-04 ENCOUNTER — RESULT REVIEW (OUTPATIENT)
Age: 75
End: 2025-06-04

## 2025-06-04 ENCOUNTER — TRANSCRIPTION ENCOUNTER (OUTPATIENT)
Age: 75
End: 2025-06-04

## 2025-06-04 ENCOUNTER — APPOINTMENT (OUTPATIENT)
Dept: ORTHOPEDIC SURGERY | Facility: HOSPITAL | Age: 75
End: 2025-06-04

## 2025-06-04 VITALS
TEMPERATURE: 98 F | HEART RATE: 68 BPM | OXYGEN SATURATION: 92 % | SYSTOLIC BLOOD PRESSURE: 146 MMHG | HEIGHT: 64 IN | WEIGHT: 195.11 LBS | RESPIRATION RATE: 16 BRPM | DIASTOLIC BLOOD PRESSURE: 73 MMHG

## 2025-06-04 DIAGNOSIS — Z98.49 CATARACT EXTRACTION STATUS, UNSPECIFIED EYE: Chronic | ICD-10-CM

## 2025-06-04 DIAGNOSIS — Z96.643 PRESENCE OF ARTIFICIAL HIP JOINT, BILATERAL: Chronic | ICD-10-CM

## 2025-06-04 DIAGNOSIS — S22.009A UNSPECIFIED FRACTURE OF UNSPECIFIED THORACIC VERTEBRA, INITIAL ENCOUNTER FOR CLOSED FRACTURE: ICD-10-CM

## 2025-06-04 DIAGNOSIS — Z98.890 OTHER SPECIFIED POSTPROCEDURAL STATES: Chronic | ICD-10-CM

## 2025-06-04 LAB
A1C WITH ESTIMATED AVERAGE GLUCOSE RESULT: 5.3 % — SIGNIFICANT CHANGE UP (ref 4–5.6)
ADD ON TEST-SPECIMEN IN LAB: SIGNIFICANT CHANGE UP
ANION GAP SERPL CALC-SCNC: 14 MMOL/L — SIGNIFICANT CHANGE UP (ref 7–14)
APTT BLD: 23.6 SEC — LOW (ref 26.1–36.8)
BLOOD GAS ARTERIAL - LYTES,HGB,ICA,LACT RESULT: SIGNIFICANT CHANGE UP
BUN SERPL-MCNC: 34 MG/DL — HIGH (ref 7–23)
CALCIUM SERPL-MCNC: 6.6 MG/DL — LOW (ref 8.4–10.5)
CHLORIDE SERPL-SCNC: 106 MMOL/L — SIGNIFICANT CHANGE UP (ref 98–107)
CO2 SERPL-SCNC: 16 MMOL/L — LOW (ref 22–31)
CREAT SERPL-MCNC: 0.75 MG/DL — SIGNIFICANT CHANGE UP (ref 0.5–1.3)
EGFR: 82 ML/MIN/1.73M2 — SIGNIFICANT CHANGE UP
EGFR: 82 ML/MIN/1.73M2 — SIGNIFICANT CHANGE UP
ESTIMATED AVERAGE GLUCOSE: 105 — SIGNIFICANT CHANGE UP
GAS PNL BLDA: SIGNIFICANT CHANGE UP
GLUCOSE BLDC GLUCOMTR-MCNC: 113 MG/DL — HIGH (ref 70–99)
GLUCOSE SERPL-MCNC: 270 MG/DL — HIGH (ref 70–99)
HCT VFR BLD CALC: 32.7 % — LOW (ref 34.5–45)
HGB BLD-MCNC: 11 G/DL — LOW (ref 11.5–15.5)
INR BLD: 1.11 RATIO — SIGNIFICANT CHANGE UP (ref 0.85–1.16)
MAGNESIUM SERPL-MCNC: 1.7 MG/DL — SIGNIFICANT CHANGE UP (ref 1.6–2.6)
MCHC RBC-ENTMCNC: 29.2 PG — SIGNIFICANT CHANGE UP (ref 27–34)
MCHC RBC-ENTMCNC: 33.6 G/DL — SIGNIFICANT CHANGE UP (ref 32–36)
MCV RBC AUTO: 86.7 FL — SIGNIFICANT CHANGE UP (ref 80–100)
NRBC # BLD AUTO: 0.02 K/UL — HIGH (ref 0–0)
NRBC # FLD: 0.02 K/UL — HIGH (ref 0–0)
NRBC BLD AUTO-RTO: 0 /100 WBCS — SIGNIFICANT CHANGE UP (ref 0–0)
PHOSPHATE SERPL-MCNC: 5.8 MG/DL — HIGH (ref 2.5–4.5)
PLATELET # BLD AUTO: 184 K/UL — SIGNIFICANT CHANGE UP (ref 150–400)
POTASSIUM SERPL-MCNC: 4.9 MMOL/L — SIGNIFICANT CHANGE UP (ref 3.5–5.3)
POTASSIUM SERPL-SCNC: 4.9 MMOL/L — SIGNIFICANT CHANGE UP (ref 3.5–5.3)
PROTHROM AB SERPL-ACNC: 13.2 SEC — SIGNIFICANT CHANGE UP (ref 9.9–13.4)
RBC # BLD: 3.77 M/UL — LOW (ref 3.8–5.2)
RBC # FLD: 14.1 % — SIGNIFICANT CHANGE UP (ref 10.3–14.5)
SODIUM SERPL-SCNC: 136 MMOL/L — SIGNIFICANT CHANGE UP (ref 135–145)
WBC # BLD: 24.39 K/UL — HIGH (ref 3.8–10.5)
WBC # FLD AUTO: 24.39 K/UL — HIGH (ref 3.8–10.5)

## 2025-06-04 PROCEDURE — 14302 TIS TRNFR ADDL 30 SQ CM: CPT

## 2025-06-04 PROCEDURE — 61783 SCAN PROC SPINAL: CPT | Mod: 80

## 2025-06-04 PROCEDURE — 22614 ARTHRD PST TQ 1NTRSPC EA ADD: CPT | Mod: 62

## 2025-06-04 PROCEDURE — 99222 1ST HOSP IP/OBS MODERATE 55: CPT | Mod: 25

## 2025-06-04 PROCEDURE — 27280 ARTHR SI JT OPN B1GRF INSTRM: CPT | Mod: 50,59,62

## 2025-06-04 PROCEDURE — 15003 WOUND PREP ADDL 100 CM: CPT

## 2025-06-04 PROCEDURE — 22610 ARTHRD PST TQ 1NTRSPC THRC: CPT | Mod: 62

## 2025-06-04 PROCEDURE — 72100 X-RAY EXAM L-S SPINE 2/3 VWS: CPT | Mod: 26

## 2025-06-04 PROCEDURE — 61783 SCAN PROC SPINAL: CPT

## 2025-06-04 PROCEDURE — 22844 INSERT SPINE FIXATION DEVICE: CPT

## 2025-06-04 PROCEDURE — 14301 TIS TRNFR ANY 30.1-60 SQ CM: CPT

## 2025-06-04 PROCEDURE — 22830 EXPLORATION OF SPINAL FUSION: CPT | Mod: 59

## 2025-06-04 PROCEDURE — 22844 INSERT SPINE FIXATION DEVICE: CPT | Mod: 80

## 2025-06-04 PROCEDURE — 88300 SURGICAL PATH GROSS: CPT | Mod: 26

## 2025-06-04 PROCEDURE — 15002 WOUND PREP TRK/ARM/LEG: CPT

## 2025-06-04 PROCEDURE — 71045 X-RAY EXAM CHEST 1 VIEW: CPT | Mod: 26

## 2025-06-04 PROCEDURE — 15734 MUSCLE-SKIN GRAFT TRUNK: CPT

## 2025-06-04 PROCEDURE — 22207 INCIS SPINE 3 COLUMN LUMBAR: CPT | Mod: 62

## 2025-06-04 PROCEDURE — 36620 INSERTION CATHETER ARTERY: CPT

## 2025-06-04 DEVICE — IMPLANTABLE DEVICE: Type: IMPLANTABLE DEVICE | Status: FUNCTIONAL

## 2025-06-04 DEVICE — SURGIFOAM 8 X 12.25CM X 2MM: Type: IMPLANTABLE DEVICE | Status: FUNCTIONAL

## 2025-06-04 DEVICE — SURGIFLO MATRIX WITH THROMBIN KIT: Type: IMPLANTABLE DEVICE | Status: FUNCTIONAL

## 2025-06-04 DEVICE — BONE WAX 2.5GM: Type: IMPLANTABLE DEVICE | Status: FUNCTIONAL

## 2025-06-04 DEVICE — SCREW 6.5X40MM: Type: IMPLANTABLE DEVICE | Status: FUNCTIONAL

## 2025-06-04 DEVICE — SCREW POLY 6.5X45MM: Type: IMPLANTABLE DEVICE | Status: FUNCTIONAL

## 2025-06-04 DEVICE — GRAFT BONE INFUSE KIT LG II: Type: IMPLANTABLE DEVICE | Status: FUNCTIONAL

## 2025-06-04 DEVICE — ROD STR 5.5X500MM: Type: IMPLANTABLE DEVICE | Status: FUNCTIONAL

## 2025-06-04 DEVICE — ARISTA 3GR: Type: IMPLANTABLE DEVICE | Status: FUNCTIONAL

## 2025-06-04 RX ORDER — VONOPRAZAN FUMARATE 13.36 MG/1
1 TABLET ORAL
Refills: 0 | DISCHARGE

## 2025-06-04 RX ORDER — BISACODYL 5 MG
5 TABLET, DELAYED RELEASE (ENTERIC COATED) ORAL EVERY 12 HOURS
Refills: 0 | Status: DISCONTINUED | OUTPATIENT
Start: 2025-06-04 | End: 2025-06-04

## 2025-06-04 RX ORDER — ACETAMINOPHEN 500 MG/5ML
1000 LIQUID (ML) ORAL EVERY 6 HOURS
Refills: 0 | Status: COMPLETED | OUTPATIENT
Start: 2025-06-04 | End: 2025-06-05

## 2025-06-04 RX ORDER — METHOCARBAMOL 500 MG/1
1500 TABLET, FILM COATED ORAL THREE TIMES A DAY
Refills: 0 | Status: DISCONTINUED | OUTPATIENT
Start: 2025-06-04 | End: 2025-06-04

## 2025-06-04 RX ORDER — DEXMEDETOMIDINE HYDROCHLORIDE IN SODIUM CHLORIDE 4 UG/ML
0.2 INJECTION INTRAVENOUS
Qty: 400 | Refills: 0 | Status: DISCONTINUED | OUTPATIENT
Start: 2025-06-04 | End: 2025-06-05

## 2025-06-04 RX ORDER — CYCLOBENZAPRINE HYDROCHLORIDE 15 MG/1
1 CAPSULE, EXTENDED RELEASE ORAL
Refills: 0 | DISCHARGE

## 2025-06-04 RX ORDER — SODIUM CHLORIDE 9 G/1000ML
1000 INJECTION, SOLUTION INTRAVENOUS
Refills: 0 | Status: DISCONTINUED | OUTPATIENT
Start: 2025-06-04 | End: 2025-06-06

## 2025-06-04 RX ORDER — ACETAMINOPHEN 500 MG/5ML
975 LIQUID (ML) ORAL ONCE
Refills: 0 | Status: COMPLETED | OUTPATIENT
Start: 2025-06-04 | End: 2025-06-04

## 2025-06-04 RX ORDER — HYDROMORPHONE/SOD CHLOR,ISO/PF 2 MG/10 ML
30 SYRINGE (ML) INJECTION
Refills: 0 | Status: DISCONTINUED | OUTPATIENT
Start: 2025-06-04 | End: 2025-06-05

## 2025-06-04 RX ORDER — HYDROMORPHONE/SOD CHLOR,ISO/PF 2 MG/10 ML
0.5 SYRINGE (ML) INJECTION
Refills: 0 | Status: DISCONTINUED | OUTPATIENT
Start: 2025-06-04 | End: 2025-06-05

## 2025-06-04 RX ORDER — ENOXAPARIN SODIUM 100 MG/ML
40 INJECTION SUBCUTANEOUS EVERY 24 HOURS
Refills: 0 | Status: DISCONTINUED | OUTPATIENT
Start: 2025-06-05 | End: 2025-06-05

## 2025-06-04 RX ORDER — PREGABALIN 50 MG/1
75 CAPSULE ORAL ONCE
Refills: 0 | Status: DISCONTINUED | OUTPATIENT
Start: 2025-06-04 | End: 2025-06-04

## 2025-06-04 RX ORDER — ENOXAPARIN SODIUM 100 MG/ML
40 INJECTION SUBCUTANEOUS EVERY 24 HOURS
Refills: 0 | Status: DISCONTINUED | OUTPATIENT
Start: 2025-06-04 | End: 2025-06-04

## 2025-06-04 RX ORDER — PREGABALIN 50 MG/1
1 CAPSULE ORAL
Refills: 0 | DISCHARGE

## 2025-06-04 RX ORDER — CALCIUM GLUCONATE 20 MG/ML
2 INJECTION, SOLUTION INTRAVENOUS ONCE
Refills: 0 | Status: COMPLETED | OUTPATIENT
Start: 2025-06-04 | End: 2025-06-05

## 2025-06-04 RX ORDER — PRAMIPEXOLE DIHYDROCHLORIDE 1 MG/1
1 TABLET ORAL
Refills: 0 | DISCHARGE

## 2025-06-04 RX ORDER — TRAMADOL HYDROCHLORIDE 50 MG/1
25 TABLET, FILM COATED ORAL ONCE
Refills: 0 | Status: DISCONTINUED | OUTPATIENT
Start: 2025-06-04 | End: 2025-06-04

## 2025-06-04 RX ORDER — MAGNESIUM HYDROXIDE 400 MG/5ML
30 SUSPENSION ORAL EVERY 12 HOURS
Refills: 0 | Status: DISCONTINUED | OUTPATIENT
Start: 2025-06-04 | End: 2025-06-04

## 2025-06-04 RX ORDER — PHENYLEPHRINE HCL IN 0.9% NACL 0.5 MG/5ML
0.1 SYRINGE (ML) INTRAVENOUS
Qty: 160 | Refills: 0 | Status: DISCONTINUED | OUTPATIENT
Start: 2025-06-04 | End: 2025-06-05

## 2025-06-04 RX ORDER — ONDANSETRON HCL/PF 4 MG/2 ML
4 VIAL (ML) INJECTION EVERY 6 HOURS
Refills: 0 | Status: DISCONTINUED | OUTPATIENT
Start: 2025-06-04 | End: 2025-06-17

## 2025-06-04 RX ORDER — ACETAMINOPHEN 500 MG/5ML
2 LIQUID (ML) ORAL
Refills: 0 | DISCHARGE

## 2025-06-04 RX ORDER — INSULIN LISPRO 100 U/ML
INJECTION, SOLUTION INTRAVENOUS; SUBCUTANEOUS EVERY 6 HOURS
Refills: 0 | Status: DISCONTINUED | OUTPATIENT
Start: 2025-06-04 | End: 2025-06-08

## 2025-06-04 RX ORDER — CHLORTHALIDONE 25 MG/1
1 TABLET ORAL
Refills: 0 | DISCHARGE

## 2025-06-04 RX ORDER — SENNA 187 MG
2 TABLET ORAL AT BEDTIME
Refills: 0 | Status: DISCONTINUED | OUTPATIENT
Start: 2025-06-04 | End: 2025-06-04

## 2025-06-04 RX ORDER — SODIUM CHLORIDE 9 G/1000ML
500 INJECTION, SOLUTION INTRAVENOUS ONCE
Refills: 0 | Status: COMPLETED | OUTPATIENT
Start: 2025-06-04 | End: 2025-06-05

## 2025-06-04 RX ORDER — NALOXONE HYDROCHLORIDE 0.4 MG/ML
0.1 INJECTION, SOLUTION INTRAMUSCULAR; INTRAVENOUS; SUBCUTANEOUS
Refills: 0 | Status: DISCONTINUED | OUTPATIENT
Start: 2025-06-04 | End: 2025-06-17

## 2025-06-04 RX ORDER — CEFAZOLIN SODIUM IN 0.9 % NACL 3 G/100 ML
2000 INTRAVENOUS SOLUTION, PIGGYBACK (ML) INTRAVENOUS EVERY 8 HOURS
Refills: 0 | Status: COMPLETED | OUTPATIENT
Start: 2025-06-04 | End: 2025-06-05

## 2025-06-04 RX ORDER — MAGNESIUM SULFATE 500 MG/ML
2 SYRINGE (ML) INJECTION ONCE
Refills: 0 | Status: COMPLETED | OUTPATIENT
Start: 2025-06-04 | End: 2025-06-05

## 2025-06-04 RX ADMIN — Medication 1 APPLICATION(S): at 07:34

## 2025-06-04 RX ADMIN — SODIUM CHLORIDE 30 MILLILITER(S): 9 INJECTION, SOLUTION INTRAVENOUS at 07:32

## 2025-06-04 RX ADMIN — TRAMADOL HYDROCHLORIDE 25 MILLIGRAM(S): 50 TABLET, FILM COATED ORAL at 07:30

## 2025-06-04 RX ADMIN — SODIUM CHLORIDE 100 MILLILITER(S): 9 INJECTION, SOLUTION INTRAVENOUS at 22:24

## 2025-06-04 RX ADMIN — Medication 1.66 MICROGRAM(S)/KG/MIN: at 22:24

## 2025-06-04 RX ADMIN — Medication 100 MILLIGRAM(S): at 22:23

## 2025-06-04 RX ADMIN — Medication 650 MILLIGRAM(S): at 07:31

## 2025-06-04 RX ADMIN — Medication 3 MILLILITER(S): at 07:39

## 2025-06-04 RX ADMIN — Medication 3 MILLILITER(S): at 22:11

## 2025-06-04 RX ADMIN — DEXMEDETOMIDINE HYDROCHLORIDE IN SODIUM CHLORIDE 4.43 MICROGRAM(S)/KG/HR: 4 INJECTION INTRAVENOUS at 22:24

## 2025-06-04 NOTE — PROCEDURE NOTE - NSPROCDETAILS_GEN_ALL_CORE
Cephalexin Pregnancy And Lactation Text: This medication is Pregnancy Category B and considered safe during pregnancy.  It is also excreted in breast milk but can be used safely for shorter doses. location identified, draped/prepped, sterile technique used, needle inserted/introduced/positive blood return obtained via catheter/connected to a pressurized flush line/sutured in place/hemostasis with direct pressure, dressing applied/Seldinger technique/all materials/supplies accounted for at end of procedure

## 2025-06-04 NOTE — BRIEF OPERATIVE NOTE - NSICDXBRIEFPROCEDURE_GEN_ALL_CORE_FT
PROCEDURES:  Myocutaneous flap of back 04-Jun-2025 18:58:13  Carmella May  
PROCEDURES:  Open lumbar laminectomy 04-Jun-2025 18:05:02  rBuna Fraga  Posterior thoracic laminectomy 04-Jun-2025 18:05:19  Bruna Fraga

## 2025-06-04 NOTE — BRIEF OPERATIVE NOTE - OPERATION/FINDINGS
T3-pelvis PSF per neurosurgery. Myocutaneous flap closure with plastic surgery. JPx3. R and L inferior JPs deep, superior RADHA superficial.
T3-pelvis with L4 pedicle osteotomy and T3-pelvis fusion

## 2025-06-04 NOTE — CONSULT NOTE ADULT - ASSESSMENT
ASSESSMENT:  74 y/o female PMH of HTN, SILVANA, PAF on Eliquis PE (in 1980s) OA, hiatal hernia (repair 2023) gastroparesis, peripheral neuropathy, SILVANA on CPAP, and extensive prior spinal surgery presents s/p Open lumbar laminectomy, Posterior thoracic laminectomy, T3-pelvis decompression/fusion with L4 pedicle osteotomy and Myocutaneous flap closure with plastic surgery. Patient required increasing amounts of phenylephrine intra-op and was therefore not extubated at case end. SICU consulted for Q1 neuro-checks, ventilator management and hemodynamic monitoring.     NEUROLOGIC   - Pain control: IV tylenol and PRN dilaudid  - Sedation: Precedex  - Q1 neuro-checks  - Post-op films when able to stand  - TLSO brace when able    RESPIRATORY   - Monitor SpO2 goal >92%, Mechanical Ventilation V/AC: 12/500/6/50%  - Wean to extubate    CARDIOVASCULAR   - Monitor hemodynamics, MAP goal > 65  - Wean cullen as tolerated    GASTROINTESTINAL   - Diet: NPO  - Protonix    /RENAL   - IV fluids: LR @ 100cc/hr  - Maintain dumont catheter, strict Is/Os  - Monitor electrolytes, replete PRN    HEMATOLOGIC  - Monitor H/H   - DVT ppx: SCD's  - HOLDing chemical DVT ppx  - HOLDing home eliquis  - Screening BLE dopplers pending    INFECTIOUS DISEASE  - Monitor fever / WBC  - Ancef x 24 hrs    ENDOCRINE  - Monitor gluc    LINES  - Left axillary A-line (6/4 - )  - Dumont (6/4 - )  - RADHA x 3 (back, 2 deep, 1 superficial)  - PIV     DISPO: SICU

## 2025-06-04 NOTE — ASU PREOP CHECKLIST - COMMENTS
Cefadroxil, voquneza, tylenol 325, clindamycin with sip of water at 0400. Cefadroxil, voquneza, tylenol 325mg , lyrica 100mg, pantoprazole, flexiril, clindamycin with sip of water at 0400.

## 2025-06-04 NOTE — CONSULT NOTE ADULT - NS ATTEND AMEND GEN_ALL_CORE FT
75F y/o female PMH of HTN, SILVANA on CPAP, PAF on Eliquis PE (in 1980s) OA, and extensive prior spinal surgery presents s/p Open lumbar laminectomy, Posterior thoracic laminectomy, T3-pelvis decompression/fusion with L4 pedicle osteotomy and Myocutaneous flap closure with plastic surgery. Intra-op hypotension managed with increasing amounts of phenylephrine intra-op and was therefore not extubated at case end. SICU consulted for Q1 neuro-checks, ventilator management and hemodynamic monitoring.     NEUROLOGIC   - Pain control: IV tylenol and PRN dilaudid  - Sedation: Precedex  - Q1 neuro-checks  - Post-op films when able to stand  - TLSO brace when able    RESPIRATORY   - Monitor SpO2 goal >92%, Mechanical Ventilation V/AC: 12/500/6/50%  - Wean to extubate    CARDIOVASCULAR   - Monitor hemodynamics, MAP goal > 65  - Wean cullen as tolerated    GASTROINTESTINAL   - Diet: NPO  - Protonix    /RENAL   - IV fluids: LR @ 100cc/hr  - Maintain dumont catheter, strict Is/Os  - Monitor electrolytes, replete PRN    HEMATOLOGIC  - Monitor H/H   - DVT ppx: SCD's  - HOLDing chemical DVT ppx for surgical risk of bleeding  - HOLDing home eliquis  - Screening BLE dopplers pending    INFECTIOUS DISEASE  - Monitor fever / WBC  - Ancef x 24 hrs    ENDOCRINE  - Monitor gluc

## 2025-06-04 NOTE — CONSULT NOTE ADULT - SUBJECTIVE AND OBJECTIVE BOX
SICU Consult Note    HPI:    74 y/o female PMH of HTN, SILVANA, PAF on Eliquis PE (in 1980s) OA, hiatal hernia (repair 2023) gastroparesis, peripheral neuropathy, SILVANA on CPAP, and extensive prior spinal surgery most recently receiving thoracic lumbar fusion with cage with Dr. Stroud on 9/24, after a fall, complicated by sepsis, and paroxysmal atrial fibrillation, development of pleural effusions, symptomatic orthostatic hypotension, urinary retention, post op anemia, chest pain with spontaneous resolution, and paraspinal abscess s/p paraspinal collection aspiration on 10/25/24 presents to presurgical testing with diagnosis of unspecified fracture thoracic vertebrae, scoliosis, and postlaminectomy syndrome. Pt twisted her back a few weeks ago resulting in vertebral fracture. Pt is now s/p Open lumbar laminectomy, Posterior thoracic laminectomy, T3-pelvis decompression/fusion with L4 pedicle osteotomy and Myocutaneous flap closure with plastic surgery. Patient required increasing amounts of phenylephrine intra-op and was therefore not extubated at case end. SICU consulted for Q1 neuro-checks, ventilator management and hemodynamic monitoring.     Surgery Information:  5L LR                                >1L EBL  750cc Albumin                   1700cc UOP  4U PRBC                            1g TXA    PMH:  2019 novel coronavirus disease (COVID-19) Dec 2020- hospitalized for 3 days, did not require home O2, denies residual symptoms  Anemia   Chronic constipation   Chronic GERD   Closed fracture of thoracic vertebra   Delayed gastric emptying   Depression   DM2 (diabetes mellitus, type 2)   Essential hypertension   GERD (gastroesophageal reflux disease)   H/O scoliosis   H/O seasonal allergies   Hearing loss   Hiatal hernia   History of chronic pain   History of pulmonary embolism developed after billings abigail surgery,1984 treated with coumadin 3 months, no issues after  MSSA (methicillin susceptible Staphylococcus aureus) infection   OA (osteoarthritis)   SILVANA on CPAP   Osteoporosis   Paroxysmal atrial fibrillation   Peripheral neuropathy   PFO (patent foramen ovale)   Postlaminectomy syndrome   Restless leg syndrome.     PAST SURGICAL HISTORY:  H/O arthroscopy of knee June 2021  History of bilateral hip replacements   History of repair of hiatal hernia   Removal of pin, plate, abigail, or screw 1991- removal of billings abigail  S/P cataract surgery   S/P dilatation and curettage 1981  S/P hip replacement left (2008)  S/P hysterectomy 1982  S/P repair of paraesophageal hernia 2001  S/P shoulder surgery right (2012)  S/P spinal fusion L4-L5 1966  S/P spinal surgery x 2 1985, 1986  Scoliosis s/p placement of billings abigail x 2 1984.     ALLERGIES:  Dilantin (Urticaria)  penicillins (Urticaria)  erythromycin (Stomach Upset; Vomiting; Nausea)      --------------------------------------------------------------------------------------    MEDICATIONS:    Neurologic Medications  fentaNYL    Injectable 25 MICROGram(s) IV Push every 5 minutes PRN Moderate Pain (4 - 6)  fentaNYL    Injectable 50 MICROGram(s) IV Push every 15 minutes PRN Severe Pain (7 - 10)  ondansetron Injectable 4 milliGRAM(s) IV Push once PRN Nausea and/or Vomiting    Respiratory Medications    Cardiovascular Medications    Gastrointestinal Medications  lactated ringers. 1000 milliLiter(s) IV Continuous <Continuous>    Genitourinary Medications    Hematologic/Oncologic Medications    Antimicrobial/Immunologic Medications    Endocrine/Metabolic Medications  insulin lispro (ADMELOG) corrective regimen sliding scale   SubCutaneous every 6 hours    Topical/Other Medications  chlorhexidine 4% Liquid 1 Application(s) Topical daily    --------------------------------------------------------------------------------------    VITAL SIGNS:  T(C): 35.3 (06-04-25 @ 19:05), Max: 36.6 (06-04-25 @ 05:51)  HR: 63 (06-04-25 @ 19:05) (63 - 68)  BP: 96/69 (06-04-25 @ 19:05) (96/69 - 146/73)  RR: 10 (06-04-25 @ 19:05) (10 - 16)  SpO2: 100% (06-04-25 @ 19:05) (92% - 100%)  --------------------------------------------------------------------------------------    INS AND OUTS:    --------------------------------------------------------------------------------------    EXAM    NEURO: NAD, Sedated on precedex  HEENT: NC/AT  RESPIRATORY: nonlabored respirations, normal CW expansion, Mechanical Ventilation V/AC: 12/500/6/50%  CARDIO: RRR, S1S2, on cullen  ABDOMEN: soft, nontender, nondistended  BACK: RADHA x 3 with sanguinous  EXTREMITIES: normal strength, no deformities    --------------------------------------------------------------------------------------    LABS    PENDING  --------------------------------------------------------------------------------------

## 2025-06-04 NOTE — ASU PREOP CHECKLIST - BP NONINVASIVE SYSTOLIC (MM HG)
11/15/2018    Yaya Frank P.A.-C.  55 y.o.   Time in/out: 8:30-9:13am    Anthropometrics/Objective  There were no vitals filed for this visit.    There is no height or weight on file to calculate BMI.    Stated Goal Weight: 140-150 lbs  Estimated Caloric needs 1306 Kcals based on MSJ   See comprehensive patient history form for further information     Subjective:  - Is seeing endocrinology, recently started on Ozempic, Acctos, Xigduo. Pt states she is only taking Ozempic due to side effects.  - Checking blood sugars in the morning, have been high lately with start of new medications low 200s  - Has had nutrition education in the past at [facility], did not find it very helpful  - Has lost ~40 lbs in the last 1.5 years and keeping off  - Not able to eat large portions  - Does not like eating a lot of meat   - Gets bored with eating the same things all the time especially vegetables and meats  - No formal exercise, states she is on her feet walking for 8 hrs a day as a     Nutrition Diagnosis (PES Statement)    · Altered nutrition related lab values related to endocrine dysfunction as evidenced by HgbA1c of 9.7%    Client history:  Condition(s) associated with a diagnosis or treatment (specify) T2DM, HTN, DLD, GERD    Biochemical data, medical test and procedures  Lab Results   Component Value Date/Time    HBA1C 9.7 (H) 10/11/2018 07:12 AM   @  No results found for: POCGLUCOSE  Lab Results   Component Value Date/Time    CHOLSTRLTOT 159 10/11/2018 07:12 AM    LDL 75 10/11/2018 07:12 AM    HDL 40 10/11/2018 07:12 AM    TRIGLYCERIDE 222 (H) 10/11/2018 07:12 AM         Nutrition Intervention  Nutrition Prescription  Carb choices/grams: 30-45 grams per meal (less is fine), 15 grams per snack     Meal and Snack  Recommend a general/healthful diet, carbohydrate controlled     Comprehensive Nutrition education Instruction or training leading to in-depth nutrition related knowledge about:  Combine carb, protein  "and fat at each meal, Menu Planning, Metabolism of carb, protein, fat, Physical activity/exercise, Portion control, Label Reading and Handouts provided regarding topics discussed: Plate Planner.     Monitoring & Evaluation Plan    Behavioral-Environmental:  Behavior: Continue checking blood sugars regularly as directed by endocrinology   Physical activity: Aim for 30 minutes of moderate physical activity a day     Food / Nutrient Intake:  Food intake: Follow the plate method for meal balance and portion control, limit high carbohydrate snacks and if consuming in 15 gram portion combined with a protein/fat containing food     Physical Signs / Symptoms:  HbA1c profiles within ADA guidelines     Assessment Notes:  The pt prefers mostly carbohydrate based foods and regularly eats taquitos, burritos, chili, chips and sourdough bread. Does not have a strong preference for protein foods or vegetables limiting her choices. We reviewed the plate method for meal balance and portion control including the foods that comprise each main food groups - protein, carbs, and fat. We reviewed in detail which foods and food group raise her blood sugar and that protein and fat generally do not. Recommended no more than 1 cup or \"1 fist\" of CHO per meal and did give her recommendations of no more than 30-45 grams (max, less is fine) CHO per meal and 15 grams per snack. Recommended limiting corn tortillas to 1-2 per meal or just 1 if including an additional CHO food like beans. Discussed some lower CHO snack ideas and importance of including a protein containing food like peanut butter or cheese along with her carbohydrate food snack. Encouraged increasing non-starchy vegetable intake at meals and snacks as well. For better blood sugar control, she will need to control and reduce her carbohydrate intake better and include more protein and vegetables throughout her day. Encouraged the pt to call with any further questions.   " 146

## 2025-06-05 ENCOUNTER — RESULT REVIEW (OUTPATIENT)
Age: 75
End: 2025-06-05

## 2025-06-05 LAB
24R-OH-CALCIDIOL SERPL-MCNC: 30 NG/ML — SIGNIFICANT CHANGE UP
ANION GAP SERPL CALC-SCNC: 11 MMOL/L — SIGNIFICANT CHANGE UP (ref 7–14)
ANION GAP SERPL CALC-SCNC: 14 MMOL/L — SIGNIFICANT CHANGE UP (ref 7–14)
ANION GAP SERPL CALC-SCNC: 16 MMOL/L — HIGH (ref 7–14)
APTT BLD: 23.2 SEC — LOW (ref 26.1–36.8)
BLD GP AB SCN SERPL QL: NEGATIVE — SIGNIFICANT CHANGE UP
BLOOD GAS ARTERIAL - LYTES,HGB,ICA,LACT RESULT: SIGNIFICANT CHANGE UP
BLOOD GAS ARTERIAL COMPREHENSIVE RESULT: SIGNIFICANT CHANGE UP
BUN SERPL-MCNC: 29 MG/DL — HIGH (ref 7–23)
BUN SERPL-MCNC: 34 MG/DL — HIGH (ref 7–23)
BUN SERPL-MCNC: 35 MG/DL — HIGH (ref 7–23)
CA-I BLD-SCNC: 0.93 MMOL/L — LOW (ref 1.15–1.29)
CALCIUM SERPL-MCNC: 6.4 MG/DL — CRITICAL LOW (ref 8.4–10.5)
CALCIUM SERPL-MCNC: 7.4 MG/DL — LOW (ref 8.4–10.5)
CALCIUM SERPL-MCNC: 8.1 MG/DL — LOW (ref 8.4–10.5)
CALCIUM SERPL-MCNC: 8.2 MG/DL — LOW (ref 8.4–10.5)
CHLORIDE SERPL-SCNC: 105 MMOL/L — SIGNIFICANT CHANGE UP (ref 98–107)
CHLORIDE SERPL-SCNC: 106 MMOL/L — SIGNIFICANT CHANGE UP (ref 98–107)
CHLORIDE SERPL-SCNC: 107 MMOL/L — SIGNIFICANT CHANGE UP (ref 98–107)
CO2 SERPL-SCNC: 16 MMOL/L — LOW (ref 22–31)
CO2 SERPL-SCNC: 18 MMOL/L — LOW (ref 22–31)
CO2 SERPL-SCNC: 22 MMOL/L — SIGNIFICANT CHANGE UP (ref 22–31)
CREAT SERPL-MCNC: 0.79 MG/DL — SIGNIFICANT CHANGE UP (ref 0.5–1.3)
CREAT SERPL-MCNC: 0.8 MG/DL — SIGNIFICANT CHANGE UP (ref 0.5–1.3)
CREAT SERPL-MCNC: 0.84 MG/DL — SIGNIFICANT CHANGE UP (ref 0.5–1.3)
EGFR: 72 ML/MIN/1.73M2 — SIGNIFICANT CHANGE UP
EGFR: 72 ML/MIN/1.73M2 — SIGNIFICANT CHANGE UP
EGFR: 76 ML/MIN/1.73M2 — SIGNIFICANT CHANGE UP
EGFR: 76 ML/MIN/1.73M2 — SIGNIFICANT CHANGE UP
EGFR: 77 ML/MIN/1.73M2 — SIGNIFICANT CHANGE UP
EGFR: 77 ML/MIN/1.73M2 — SIGNIFICANT CHANGE UP
GLUCOSE BLDC GLUCOMTR-MCNC: 104 MG/DL — HIGH (ref 70–99)
GLUCOSE BLDC GLUCOMTR-MCNC: 167 MG/DL — HIGH (ref 70–99)
GLUCOSE BLDC GLUCOMTR-MCNC: 172 MG/DL — HIGH (ref 70–99)
GLUCOSE BLDC GLUCOMTR-MCNC: 172 MG/DL — HIGH (ref 70–99)
GLUCOSE BLDC GLUCOMTR-MCNC: 238 MG/DL — HIGH (ref 70–99)
GLUCOSE SERPL-MCNC: 151 MG/DL — HIGH (ref 70–99)
GLUCOSE SERPL-MCNC: 153 MG/DL — HIGH (ref 70–99)
GLUCOSE SERPL-MCNC: 199 MG/DL — HIGH (ref 70–99)
HCT VFR BLD CALC: 23.5 % — LOW (ref 34.5–45)
HCT VFR BLD CALC: 25.1 % — LOW (ref 34.5–45)
HCT VFR BLD CALC: 27.3 % — LOW (ref 34.5–45)
HCT VFR BLD CALC: 31.1 % — LOW (ref 34.5–45)
HGB BLD-MCNC: 10.9 G/DL — LOW (ref 11.5–15.5)
HGB BLD-MCNC: 8.4 G/DL — LOW (ref 11.5–15.5)
HGB BLD-MCNC: 8.7 G/DL — LOW (ref 11.5–15.5)
HGB BLD-MCNC: 9.7 G/DL — LOW (ref 11.5–15.5)
INR BLD: 1.16 RATIO — SIGNIFICANT CHANGE UP (ref 0.85–1.16)
MAGNESIUM SERPL-MCNC: 1.5 MG/DL — LOW (ref 1.6–2.6)
MAGNESIUM SERPL-MCNC: 1.6 MG/DL — SIGNIFICANT CHANGE UP (ref 1.6–2.6)
MAGNESIUM SERPL-MCNC: 2.1 MG/DL — SIGNIFICANT CHANGE UP (ref 1.6–2.6)
MCHC RBC-ENTMCNC: 29.1 PG — SIGNIFICANT CHANGE UP (ref 27–34)
MCHC RBC-ENTMCNC: 29.4 PG — SIGNIFICANT CHANGE UP (ref 27–34)
MCHC RBC-ENTMCNC: 29.4 PG — SIGNIFICANT CHANGE UP (ref 27–34)
MCHC RBC-ENTMCNC: 29.6 PG — SIGNIFICANT CHANGE UP (ref 27–34)
MCHC RBC-ENTMCNC: 34.7 G/DL — SIGNIFICANT CHANGE UP (ref 32–36)
MCHC RBC-ENTMCNC: 35 G/DL — SIGNIFICANT CHANGE UP (ref 32–36)
MCHC RBC-ENTMCNC: 35.5 G/DL — SIGNIFICANT CHANGE UP (ref 32–36)
MCHC RBC-ENTMCNC: 35.7 G/DL — SIGNIFICANT CHANGE UP (ref 32–36)
MCV RBC AUTO: 82.7 FL — SIGNIFICANT CHANGE UP (ref 80–100)
MCV RBC AUTO: 82.7 FL — SIGNIFICANT CHANGE UP (ref 80–100)
MCV RBC AUTO: 83.8 FL — SIGNIFICANT CHANGE UP (ref 80–100)
MCV RBC AUTO: 83.9 FL — SIGNIFICANT CHANGE UP (ref 80–100)
NRBC # BLD AUTO: 0.03 K/UL — HIGH (ref 0–0)
NRBC # FLD: 0.03 K/UL — HIGH (ref 0–0)
NRBC BLD AUTO-RTO: 0 /100 WBCS — SIGNIFICANT CHANGE UP (ref 0–0)
PHOSPHATE SERPL-MCNC: 2.7 MG/DL — SIGNIFICANT CHANGE UP (ref 2.5–4.5)
PHOSPHATE SERPL-MCNC: 4.7 MG/DL — HIGH (ref 2.5–4.5)
PHOSPHATE SERPL-MCNC: 5.1 MG/DL — HIGH (ref 2.5–4.5)
PLATELET # BLD AUTO: 128 K/UL — LOW (ref 150–400)
PLATELET # BLD AUTO: 130 K/UL — LOW (ref 150–400)
PLATELET # BLD AUTO: 152 K/UL — SIGNIFICANT CHANGE UP (ref 150–400)
PLATELET # BLD AUTO: 193 K/UL — SIGNIFICANT CHANGE UP (ref 150–400)
POTASSIUM SERPL-MCNC: 3.6 MMOL/L — SIGNIFICANT CHANGE UP (ref 3.5–5.3)
POTASSIUM SERPL-MCNC: 4.2 MMOL/L — SIGNIFICANT CHANGE UP (ref 3.5–5.3)
POTASSIUM SERPL-MCNC: 4.5 MMOL/L — SIGNIFICANT CHANGE UP (ref 3.5–5.3)
POTASSIUM SERPL-SCNC: 3.6 MMOL/L — SIGNIFICANT CHANGE UP (ref 3.5–5.3)
POTASSIUM SERPL-SCNC: 4.2 MMOL/L — SIGNIFICANT CHANGE UP (ref 3.5–5.3)
POTASSIUM SERPL-SCNC: 4.5 MMOL/L — SIGNIFICANT CHANGE UP (ref 3.5–5.3)
PROTHROM AB SERPL-ACNC: 13.8 SEC — HIGH (ref 9.9–13.4)
PTH-INTACT FLD-MCNC: 59 PG/ML — SIGNIFICANT CHANGE UP (ref 15–65)
RBC # BLD: 2.84 M/UL — LOW (ref 3.8–5.2)
RBC # BLD: 2.99 M/UL — LOW (ref 3.8–5.2)
RBC # BLD: 3.3 M/UL — LOW (ref 3.8–5.2)
RBC # BLD: 3.71 M/UL — LOW (ref 3.8–5.2)
RBC # FLD: 14.1 % — SIGNIFICANT CHANGE UP (ref 10.3–14.5)
RBC # FLD: 14.4 % — SIGNIFICANT CHANGE UP (ref 10.3–14.5)
RBC # FLD: 14.5 % — SIGNIFICANT CHANGE UP (ref 10.3–14.5)
RBC # FLD: 14.6 % — HIGH (ref 10.3–14.5)
RH IG SCN BLD-IMP: NEGATIVE — SIGNIFICANT CHANGE UP
SODIUM SERPL-SCNC: 138 MMOL/L — SIGNIFICANT CHANGE UP (ref 135–145)
SODIUM SERPL-SCNC: 138 MMOL/L — SIGNIFICANT CHANGE UP (ref 135–145)
SODIUM SERPL-SCNC: 139 MMOL/L — SIGNIFICANT CHANGE UP (ref 135–145)
T4 AB SER-ACNC: 4.44 UG/DL — LOW (ref 5.1–13)
TSH SERPL-MCNC: 0.72 UIU/ML — SIGNIFICANT CHANGE UP (ref 0.27–4.2)
VIT D25+D1,25 OH+D1,25 PNL SERPL-MCNC: 56.9 PG/ML — SIGNIFICANT CHANGE UP (ref 19.9–79.3)
WBC # BLD: 16.35 K/UL — HIGH (ref 3.8–10.5)
WBC # BLD: 17.48 K/UL — HIGH (ref 3.8–10.5)
WBC # BLD: 19.57 K/UL — HIGH (ref 3.8–10.5)
WBC # BLD: 25.05 K/UL — HIGH (ref 3.8–10.5)
WBC # FLD AUTO: 16.35 K/UL — HIGH (ref 3.8–10.5)
WBC # FLD AUTO: 17.48 K/UL — HIGH (ref 3.8–10.5)
WBC # FLD AUTO: 19.57 K/UL — HIGH (ref 3.8–10.5)
WBC # FLD AUTO: 25.05 K/UL — HIGH (ref 3.8–10.5)

## 2025-06-05 PROCEDURE — 70496 CT ANGIOGRAPHY HEAD: CPT | Mod: 26

## 2025-06-05 PROCEDURE — 99222 1ST HOSP IP/OBS MODERATE 55: CPT

## 2025-06-05 PROCEDURE — 70450 CT HEAD/BRAIN W/O DYE: CPT | Mod: 26,XU

## 2025-06-05 PROCEDURE — 93970 EXTREMITY STUDY: CPT | Mod: 26

## 2025-06-05 PROCEDURE — 70498 CT ANGIOGRAPHY NECK: CPT | Mod: 26

## 2025-06-05 PROCEDURE — G0425: CPT | Mod: 2W

## 2025-06-05 PROCEDURE — 71045 X-RAY EXAM CHEST 1 VIEW: CPT | Mod: 26

## 2025-06-05 PROCEDURE — 99291 CRITICAL CARE FIRST HOUR: CPT

## 2025-06-05 RX ORDER — ALBUMIN (HUMAN) 12.5 G/50ML
250 INJECTION, SOLUTION INTRAVENOUS ONCE
Refills: 0 | Status: COMPLETED | OUTPATIENT
Start: 2025-06-05 | End: 2025-06-05

## 2025-06-05 RX ORDER — MAGNESIUM SULFATE 500 MG/ML
2 SYRINGE (ML) INJECTION ONCE
Refills: 0 | Status: COMPLETED | OUTPATIENT
Start: 2025-06-05 | End: 2025-06-05

## 2025-06-05 RX ORDER — SODIUM CHLORIDE 9 G/1000ML
500 INJECTION, SOLUTION INTRAVENOUS ONCE
Refills: 0 | Status: COMPLETED | OUTPATIENT
Start: 2025-06-05 | End: 2025-06-05

## 2025-06-05 RX ORDER — HYDROMORPHONE/SOD CHLOR,ISO/PF 2 MG/10 ML
1 SYRINGE (ML) INJECTION
Refills: 0 | Status: DISCONTINUED | OUTPATIENT
Start: 2025-06-05 | End: 2025-06-06

## 2025-06-05 RX ORDER — CALCIUM GLUCONATE 20 MG/ML
2 INJECTION, SOLUTION INTRAVENOUS ONCE
Refills: 0 | Status: COMPLETED | OUTPATIENT
Start: 2025-06-05 | End: 2025-06-05

## 2025-06-05 RX ORDER — FENTANYL CITRATE-0.9 % NACL/PF 100MCG/2ML
0.5 SYRINGE (ML) INTRAVENOUS
Qty: 2500 | Refills: 0 | Status: DISCONTINUED | OUTPATIENT
Start: 2025-06-05 | End: 2025-06-05

## 2025-06-05 RX ORDER — NOREPINEPHRINE BITARTRATE 8 MG
0.08 SOLUTION INTRAVENOUS
Qty: 8 | Refills: 0 | Status: DISCONTINUED | OUTPATIENT
Start: 2025-06-05 | End: 2025-06-06

## 2025-06-05 RX ORDER — HYDROMORPHONE/SOD CHLOR,ISO/PF 2 MG/10 ML
0.5 SYRINGE (ML) INJECTION
Refills: 0 | Status: DISCONTINUED | OUTPATIENT
Start: 2025-06-05 | End: 2025-06-06

## 2025-06-05 RX ORDER — CALCIUM GLUCONATE 20 MG/ML
2 INJECTION, SOLUTION INTRAVENOUS ONCE
Refills: 0 | Status: COMPLETED | OUTPATIENT
Start: 2025-06-05 | End: 2025-06-06

## 2025-06-05 RX ORDER — ACETAMINOPHEN 500 MG/5ML
1000 LIQUID (ML) ORAL EVERY 6 HOURS
Refills: 0 | Status: COMPLETED | OUTPATIENT
Start: 2025-06-05 | End: 2025-06-06

## 2025-06-05 RX ORDER — POTASSIUM PHOSPHATE, MONOBASIC POTASSIUM PHOSPHATE, DIBASIC INJECTION, 236; 224 MG/ML; MG/ML
15 SOLUTION, CONCENTRATE INTRAVENOUS ONCE
Refills: 0 | Status: COMPLETED | OUTPATIENT
Start: 2025-06-05 | End: 2025-06-06

## 2025-06-05 RX ADMIN — Medication 1 MILLIGRAM(S): at 11:45

## 2025-06-05 RX ADMIN — Medication 15 MILLILITER(S): at 05:27

## 2025-06-05 RX ADMIN — CALCIUM GLUCONATE 200 GRAM(S): 20 INJECTION, SOLUTION INTRAVENOUS at 03:14

## 2025-06-05 RX ADMIN — Medication 400 MILLIGRAM(S): at 18:10

## 2025-06-05 RX ADMIN — DEXMEDETOMIDINE HYDROCHLORIDE IN SODIUM CHLORIDE 4.43 MICROGRAM(S)/KG/HR: 4 INJECTION INTRAVENOUS at 08:26

## 2025-06-05 RX ADMIN — SODIUM CHLORIDE 100 MILLILITER(S): 9 INJECTION, SOLUTION INTRAVENOUS at 11:25

## 2025-06-05 RX ADMIN — Medication 1 MILLIGRAM(S): at 23:15

## 2025-06-05 RX ADMIN — Medication 1 MILLIGRAM(S): at 09:00

## 2025-06-05 RX ADMIN — INSULIN LISPRO 1: 100 INJECTION, SOLUTION INTRAVENOUS; SUBCUTANEOUS at 05:25

## 2025-06-05 RX ADMIN — Medication 25 GRAM(S): at 06:00

## 2025-06-05 RX ADMIN — Medication 4.43 MICROGRAM(S)/KG/HR: at 03:29

## 2025-06-05 RX ADMIN — Medication 1 MILLIGRAM(S): at 11:20

## 2025-06-05 RX ADMIN — Medication 400 MILLIGRAM(S): at 00:31

## 2025-06-05 RX ADMIN — Medication 25 GRAM(S): at 00:32

## 2025-06-05 RX ADMIN — Medication 3 MILLILITER(S): at 06:00

## 2025-06-05 RX ADMIN — Medication 400 MILLIGRAM(S): at 05:27

## 2025-06-05 RX ADMIN — Medication 40 MILLIGRAM(S): at 11:25

## 2025-06-05 RX ADMIN — Medication 400 MILLIGRAM(S): at 22:53

## 2025-06-05 RX ADMIN — INSULIN LISPRO 1: 100 INJECTION, SOLUTION INTRAVENOUS; SUBCUTANEOUS at 11:37

## 2025-06-05 RX ADMIN — Medication 100 MILLIGRAM(S): at 05:26

## 2025-06-05 RX ADMIN — Medication 1000 MILLIGRAM(S): at 05:30

## 2025-06-05 RX ADMIN — Medication 1 MILLIGRAM(S): at 16:44

## 2025-06-05 RX ADMIN — DEXMEDETOMIDINE HYDROCHLORIDE IN SODIUM CHLORIDE 4.43 MICROGRAM(S)/KG/HR: 4 INJECTION INTRAVENOUS at 11:24

## 2025-06-05 RX ADMIN — Medication 1 APPLICATION(S): at 11:25

## 2025-06-05 RX ADMIN — Medication 25 GRAM(S): at 08:15

## 2025-06-05 RX ADMIN — NOREPINEPHRINE BITARTRATE 13.3 MICROGRAM(S)/KG/MIN: 8 SOLUTION at 08:25

## 2025-06-05 RX ADMIN — ALBUMIN (HUMAN) 125 MILLILITER(S): 12.5 INJECTION, SOLUTION INTRAVENOUS at 02:19

## 2025-06-05 RX ADMIN — Medication 1 MILLIGRAM(S): at 08:15

## 2025-06-05 RX ADMIN — INSULIN LISPRO 2: 100 INJECTION, SOLUTION INTRAVENOUS; SUBCUTANEOUS at 01:10

## 2025-06-05 RX ADMIN — SODIUM CHLORIDE 500 MILLILITER(S): 9 INJECTION, SOLUTION INTRAVENOUS at 16:46

## 2025-06-05 RX ADMIN — Medication 1000 MILLIGRAM(S): at 18:10

## 2025-06-05 RX ADMIN — Medication 400 MILLIGRAM(S): at 11:25

## 2025-06-05 RX ADMIN — Medication 1 MILLIGRAM(S): at 17:10

## 2025-06-05 RX ADMIN — CALCIUM GLUCONATE 200 GRAM(S): 20 INJECTION, SOLUTION INTRAVENOUS at 00:32

## 2025-06-05 RX ADMIN — NOREPINEPHRINE BITARTRATE 13.3 MICROGRAM(S)/KG/MIN: 8 SOLUTION at 11:25

## 2025-06-05 RX ADMIN — SODIUM CHLORIDE 1000 MILLILITER(S): 9 INJECTION, SOLUTION INTRAVENOUS at 00:32

## 2025-06-05 RX ADMIN — SODIUM CHLORIDE 100 MILLILITER(S): 9 INJECTION, SOLUTION INTRAVENOUS at 21:30

## 2025-06-05 RX ADMIN — Medication 3 MILLILITER(S): at 21:17

## 2025-06-05 RX ADMIN — Medication 1000 MILLIGRAM(S): at 11:45

## 2025-06-05 RX ADMIN — Medication 1000 MILLIGRAM(S): at 01:01

## 2025-06-05 RX ADMIN — Medication 3 MILLILITER(S): at 13:07

## 2025-06-05 RX ADMIN — INSULIN LISPRO 1: 100 INJECTION, SOLUTION INTRAVENOUS; SUBCUTANEOUS at 18:12

## 2025-06-05 NOTE — PROGRESS NOTE ADULT - SUBJECTIVE AND OBJECTIVE BOX
---___---___---___---___---___---___ ---___---___---___---___---___---___---___---___---                  S U R G I C A L   I C U   P R O G R E S S   N O T E  ---___---___---___---___---___---___ ---___---___---___---___---___---___---___---___---    INTERVAL EVENTS:  - L axillary A line placed (R radial A line placed in OR was nonfunctional and removed)  - 500 cc LR bolus given for increasing vasopressor requirements and elevated lactate      [ ] Due to altered mental status/intubation, subjective information were not able to be obtained from the patient. History was obtained, to the extent possible, from review of the chart and collateral sources of information.    OBJECTIVE:    VITALS:  Vital Signs Last 24 Hrs  T(C): 35.3 (04 Jun 2025 19:05), Max: 36.6 (04 Jun 2025 05:51)  T(F): 95.5 (04 Jun 2025 19:05), Max: 97.9 (04 Jun 2025 05:51)  HR: 87 (04 Jun 2025 22:42) (63 - 87)  BP: 107/33 (04 Jun 2025 20:00) (96/69 - 146/73)  BP(mean): 44 (04 Jun 2025 20:00) (44 - 79)  RR: 21 (04 Jun 2025 22:00) (10 - 21)  SpO2: 100% (04 Jun 2025 22:42) (92% - 100%)    Parameters below as of 04 Jun 2025 22:00  Patient On (Oxygen Delivery Method): ventilator      Mode: AC/ CMV (Assist Control/ Continuous Mandatory Ventilation)  RR (machine): 12  TV (machine): 500  FiO2: 50  PEEP: 6  ITime: 0.91  MAP: 9  PIP: 21    I&O's Summary    04 Jun 2025 07:01  -  05 Jun 2025 00:38  --------------------------------------------------------  IN: 350 mL / OUT: 0 mL / NET: 350 mL        PHYSICAL EXAM:    NEURO  Exam:  sedated    RESPIRATORY  Exam:  intubated and ventilated  Mechanical Ventilation: Mode: AC/ CMV (Assist Control/ Continuous Mandatory Ventilation), RR (machine): 12, RR (patient): 14, TV (machine): 500, FiO2: 50, PEEP: 6, ITime: 0.91, MAP: 9, PIP: 21    CARDIOVASCULAR  Exam:  RRR, on phenylephrine  Cardiac Rhythm:  sinus    GI/NUTRITION  Exam:  soft, nt, nd  Diet: NPO    BACK:  Exam: incision with mild strikethrough, RADHA x 2 in deep space with sang output, RADHA x 1 in superficial space with minimal s/s output    GENITOURINARY  Exam: voiding via dumont    ACCESS DEVICES:  [ x] Peripheral IV  [ ] Central Venous Line	[ ] R	[ ] L	[ ] IJ	[ ] Fem	[ ] SC	Placed:   [x ] Arterial Line		[ ] R	[ x] L	[ ] Fem	[ ] Rad	[ x] Ax	Placed: 6/4  [ ] PICC:					[ ] Mediport  [ x] Urinary Catheter, Date Placed: 6/4  [x] Necessity of urinary, arterial, and venous catheters discussed      LABS:                        11.0   24.39 )-----------( 184      ( 04 Jun 2025 21:08 )             32.7   06-04    136  |  106  |  34[H]  ----------------------------<  270[H]  4.9   |  16[L]  |  0.75    Ca    6.6[L]      04 Jun 2025 21:08  Phos  5.8     06-04  Mg     1.70     06-04      CAPILLARY BLOOD GLUCOSE      POCT Blood Glucose.: 113 mg/dL (04 Jun 2025 06:34)      MEDICATIONS:   MEDICATIONS  (STANDING):  acetaminophen   IVPB .. 1000 milliGRAM(s) IV Intermittent every 6 hours  ceFAZolin   IVPB 2000 milliGRAM(s) IV Intermittent every 8 hours  chlorhexidine 0.12% Liquid 15 milliLiter(s) Oral Mucosa every 12 hours  chlorhexidine 2% Cloths 1 Application(s) Topical daily  dexMEDEtomidine Infusion 0.2 MICROgram(s)/kG/Hr (4.43 mL/Hr) IV Continuous <Continuous>  enoxaparin Injectable 40 milliGRAM(s) SubCutaneous every 24 hours  HYDROmorphone PCA (1 mG/mL) 30 milliLiter(s) PCA Continuous PCA Continuous  insulin lispro (ADMELOG) corrective regimen sliding scale   SubCutaneous every 6 hours  lactated ringers. 1000 milliLiter(s) (100 mL/Hr) IV Continuous <Continuous>  pantoprazole  Injectable 40 milliGRAM(s) IV Push daily  phenylephrine    Infusion 0.1 MICROgram(s)/kG/Min (1.66 mL/Hr) IV Continuous <Continuous>  sodium chloride 0.9% lock flush 3 milliLiter(s) IV Push every 8 hours    MEDICATIONS  (PRN):  HYDROmorphone  Injectable 0.5 milliGRAM(s) IV Push every 3 hours PRN Mild Pain (1 - 3), Moderate Pain (4 - 6), Severe Pain (7 - 10)  HYDROmorphone PCA (1 mG/mL) Rescue Clinician Bolus 0.5 milliGRAM(s) IV Push every 15 minutes PRN for Pain Scale GREATER THAN 6  naloxone Injectable 0.1 milliGRAM(s) IV Push every 3 minutes PRN For ANY of the following changes in patient status:  A. RR LESS THAN 10 breaths per minute, B. Oxygen saturation LESS THAN 90%, C. Sedation score of 6  ondansetron Injectable 4 milliGRAM(s) IV Push every 6 hours PRN Nausea     ---___---___---___---___---___---___ ---___---___---___---___---___---___---___---___---                  S U R G I C A L   I C U   P R O G R E S S   N O T E  ---___---___---___---___---___---___ ---___---___---___---___---___---___---___---___---    INTERVAL EVENTS:  - L axillary A line placed (R radial A line placed in OR was nonfunctional and removed)  - 500 cc LR bolus given for increasing vasopressor requirements and elevated lactate  - norepinephrine added      [ ] Due to altered mental status/intubation, subjective information were not able to be obtained from the patient. History was obtained, to the extent possible, from review of the chart and collateral sources of information.    OBJECTIVE:    VITALS:  Vital Signs Last 24 Hrs  T(C): 35.3 (04 Jun 2025 19:05), Max: 36.6 (04 Jun 2025 05:51)  T(F): 95.5 (04 Jun 2025 19:05), Max: 97.9 (04 Jun 2025 05:51)  HR: 87 (04 Jun 2025 22:42) (63 - 87)  BP: 107/33 (04 Jun 2025 20:00) (96/69 - 146/73)  BP(mean): 44 (04 Jun 2025 20:00) (44 - 79)  RR: 21 (04 Jun 2025 22:00) (10 - 21)  SpO2: 100% (04 Jun 2025 22:42) (92% - 100%)    Parameters below as of 04 Jun 2025 22:00  Patient On (Oxygen Delivery Method): ventilator      Mode: AC/ CMV (Assist Control/ Continuous Mandatory Ventilation)  RR (machine): 12  TV (machine): 500  FiO2: 50  PEEP: 6  ITime: 0.91  MAP: 9  PIP: 21    I&O's Summary    04 Jun 2025 07:01  -  05 Jun 2025 00:38  --------------------------------------------------------  IN: 350 mL / OUT: 0 mL / NET: 350 mL        PHYSICAL EXAM:    NEURO  Exam:  sedated    RESPIRATORY  Exam:  intubated and ventilated  Mechanical Ventilation: Mode: AC/ CMV (Assist Control/ Continuous Mandatory Ventilation), RR (machine): 12, RR (patient): 14, TV (machine): 500, FiO2: 50, PEEP: 6, ITime: 0.91, MAP: 9, PIP: 21    CARDIOVASCULAR  Exam:  RRR, on phenylephrine  Cardiac Rhythm:  sinus    GI/NUTRITION  Exam:  soft, nt, nd  Diet: NPO    BACK:  Exam: incision with mild strikethrough, RADHA x 2 in deep space with sang output, RADHA x 1 in superficial space with minimal s/s output    GENITOURINARY  Exam: voiding via dumont    ACCESS DEVICES:  [ x] Peripheral IV  [ ] Central Venous Line	[ ] R	[ ] L	[ ] IJ	[ ] Fem	[ ] SC	Placed:   [x ] Arterial Line		[ ] R	[ x] L	[ ] Fem	[ ] Rad	[ x] Ax	Placed: 6/4  [ ] PICC:					[ ] Mediport  [ x] Urinary Catheter, Date Placed: 6/4  [x] Necessity of urinary, arterial, and venous catheters discussed      LABS:                        11.0   24.39 )-----------( 184      ( 04 Jun 2025 21:08 )             32.7   06-04    136  |  106  |  34[H]  ----------------------------<  270[H]  4.9   |  16[L]  |  0.75    Ca    6.6[L]      04 Jun 2025 21:08  Phos  5.8     06-04  Mg     1.70     06-04      CAPILLARY BLOOD GLUCOSE      POCT Blood Glucose.: 113 mg/dL (04 Jun 2025 06:34)      MEDICATIONS:   MEDICATIONS  (STANDING):  acetaminophen   IVPB .. 1000 milliGRAM(s) IV Intermittent every 6 hours  ceFAZolin   IVPB 2000 milliGRAM(s) IV Intermittent every 8 hours  chlorhexidine 0.12% Liquid 15 milliLiter(s) Oral Mucosa every 12 hours  chlorhexidine 2% Cloths 1 Application(s) Topical daily  dexMEDEtomidine Infusion 0.2 MICROgram(s)/kG/Hr (4.43 mL/Hr) IV Continuous <Continuous>  enoxaparin Injectable 40 milliGRAM(s) SubCutaneous every 24 hours  HYDROmorphone PCA (1 mG/mL) 30 milliLiter(s) PCA Continuous PCA Continuous  insulin lispro (ADMELOG) corrective regimen sliding scale   SubCutaneous every 6 hours  lactated ringers. 1000 milliLiter(s) (100 mL/Hr) IV Continuous <Continuous>  pantoprazole  Injectable 40 milliGRAM(s) IV Push daily  phenylephrine    Infusion 0.1 MICROgram(s)/kG/Min (1.66 mL/Hr) IV Continuous <Continuous>  sodium chloride 0.9% lock flush 3 milliLiter(s) IV Push every 8 hours    MEDICATIONS  (PRN):  HYDROmorphone  Injectable 0.5 milliGRAM(s) IV Push every 3 hours PRN Mild Pain (1 - 3), Moderate Pain (4 - 6), Severe Pain (7 - 10)  HYDROmorphone PCA (1 mG/mL) Rescue Clinician Bolus 0.5 milliGRAM(s) IV Push every 15 minutes PRN for Pain Scale GREATER THAN 6  naloxone Injectable 0.1 milliGRAM(s) IV Push every 3 minutes PRN For ANY of the following changes in patient status:  A. RR LESS THAN 10 breaths per minute, B. Oxygen saturation LESS THAN 90%, C. Sedation score of 6  ondansetron Injectable 4 milliGRAM(s) IV Push every 6 hours PRN Nausea     ---___---___---___---___---___---___ ---___---___---___---___---___---___---___---___---                  S U R G I C A L   I C U   P R O G R E S S   N O T E  ---___---___---___---___---___---___ ---___---___---___---___---___---___---___---___---    INTERVAL EVENTS:  - L axillary A line placed (R radial A line placed in OR was nonfunctional and removed)  - 500 cc LR bolus given for increasing vasopressor requirements and elevated lactate  - norepinephrine added  - 250 albumin x 1      [ ] Due to altered mental status/intubation, subjective information were not able to be obtained from the patient. History was obtained, to the extent possible, from review of the chart and collateral sources of information.    OBJECTIVE:    VITALS:  Vital Signs Last 24 Hrs  T(C): 35.3 (04 Jun 2025 19:05), Max: 36.6 (04 Jun 2025 05:51)  T(F): 95.5 (04 Jun 2025 19:05), Max: 97.9 (04 Jun 2025 05:51)  HR: 87 (04 Jun 2025 22:42) (63 - 87)  BP: 107/33 (04 Jun 2025 20:00) (96/69 - 146/73)  BP(mean): 44 (04 Jun 2025 20:00) (44 - 79)  RR: 21 (04 Jun 2025 22:00) (10 - 21)  SpO2: 100% (04 Jun 2025 22:42) (92% - 100%)    Parameters below as of 04 Jun 2025 22:00  Patient On (Oxygen Delivery Method): ventilator      Mode: AC/ CMV (Assist Control/ Continuous Mandatory Ventilation)  RR (machine): 12  TV (machine): 500  FiO2: 50  PEEP: 6  ITime: 0.91  MAP: 9  PIP: 21    I&O's Summary    04 Jun 2025 07:01  -  05 Jun 2025 00:38  --------------------------------------------------------  IN: 350 mL / OUT: 0 mL / NET: 350 mL        PHYSICAL EXAM:    NEURO  Exam:  sedated    RESPIRATORY  Exam:  intubated and ventilated  Mechanical Ventilation: Mode: AC/ CMV (Assist Control/ Continuous Mandatory Ventilation), RR (machine): 12, RR (patient): 14, TV (machine): 500, FiO2: 50, PEEP: 6, ITime: 0.91, MAP: 9, PIP: 21    CARDIOVASCULAR  Exam:  RRR, on phenylephrine  Cardiac Rhythm:  sinus    GI/NUTRITION  Exam:  soft, nt, nd  Diet: NPO    BACK:  Exam: incision with mild strikethrough, RADHA x 2 in deep space with sang output, RADHA x 1 in superficial space with minimal s/s output    GENITOURINARY  Exam: voiding via dumont    ACCESS DEVICES:  [ x] Peripheral IV  [ ] Central Venous Line	[ ] R	[ ] L	[ ] IJ	[ ] Fem	[ ] SC	Placed:   [x ] Arterial Line		[ ] R	[ x] L	[ ] Fem	[ ] Rad	[ x] Ax	Placed: 6/4  [ ] PICC:					[ ] Mediport  [ x] Urinary Catheter, Date Placed: 6/4  [x] Necessity of urinary, arterial, and venous catheters discussed      LABS:                        11.0   24.39 )-----------( 184      ( 04 Jun 2025 21:08 )             32.7   06-04    136  |  106  |  34[H]  ----------------------------<  270[H]  4.9   |  16[L]  |  0.75    Ca    6.6[L]      04 Jun 2025 21:08  Phos  5.8     06-04  Mg     1.70     06-04      CAPILLARY BLOOD GLUCOSE      POCT Blood Glucose.: 113 mg/dL (04 Jun 2025 06:34)      MEDICATIONS:   MEDICATIONS  (STANDING):  acetaminophen   IVPB .. 1000 milliGRAM(s) IV Intermittent every 6 hours  ceFAZolin   IVPB 2000 milliGRAM(s) IV Intermittent every 8 hours  chlorhexidine 0.12% Liquid 15 milliLiter(s) Oral Mucosa every 12 hours  chlorhexidine 2% Cloths 1 Application(s) Topical daily  dexMEDEtomidine Infusion 0.2 MICROgram(s)/kG/Hr (4.43 mL/Hr) IV Continuous <Continuous>  enoxaparin Injectable 40 milliGRAM(s) SubCutaneous every 24 hours  HYDROmorphone PCA (1 mG/mL) 30 milliLiter(s) PCA Continuous PCA Continuous  insulin lispro (ADMELOG) corrective regimen sliding scale   SubCutaneous every 6 hours  lactated ringers. 1000 milliLiter(s) (100 mL/Hr) IV Continuous <Continuous>  pantoprazole  Injectable 40 milliGRAM(s) IV Push daily  phenylephrine    Infusion 0.1 MICROgram(s)/kG/Min (1.66 mL/Hr) IV Continuous <Continuous>  sodium chloride 0.9% lock flush 3 milliLiter(s) IV Push every 8 hours    MEDICATIONS  (PRN):  HYDROmorphone  Injectable 0.5 milliGRAM(s) IV Push every 3 hours PRN Mild Pain (1 - 3), Moderate Pain (4 - 6), Severe Pain (7 - 10)  HYDROmorphone PCA (1 mG/mL) Rescue Clinician Bolus 0.5 milliGRAM(s) IV Push every 15 minutes PRN for Pain Scale GREATER THAN 6  naloxone Injectable 0.1 milliGRAM(s) IV Push every 3 minutes PRN For ANY of the following changes in patient status:  A. RR LESS THAN 10 breaths per minute, B. Oxygen saturation LESS THAN 90%, C. Sedation score of 6  ondansetron Injectable 4 milliGRAM(s) IV Push every 6 hours PRN Nausea

## 2025-06-05 NOTE — CONSULT NOTE ADULT - SUBJECTIVE AND OBJECTIVE BOX
74 y/o female PMH of HTN, SILVANA, PAF on Eliquis PE (in 1980s) OA, hiatal hernia (repair 2023) gastroparesis, peripheral neuropathy, SILVANA on CPAP, and extensive prior spinal surgery most recently receiving thoracic lumbar fusion with cage with Dr. Stroud on 9/24, after a fall, complicated by sepsis, and paroxysmal atrial fibrillation, development of pleural effusions, symptomatic orthostatic hypotension, urinary retention, post op anemia, chest pain with spontaneous resolution, and paraspinal abscess s/p paraspinal collection aspiration on 10/25/24 presents to presurgical testing with diagnosis of unspecified fracture thoracic vertebrae, scoliosis, and postlaminectomy syndrome. Pt twisted her back a few weeks ago resulting in vertebral fracture. Pt is now s/p Open lumbar laminectomy, Posterior thoracic laminectomy, T3-pelvis decompression/fusion with L4 pedicle osteotomy and Myocutaneous flap closure with plastic surgery. Patient required increasing amounts of phenylephrine intra-op and was therefore not extubated at case end. SICU consulted for Q1 neuro-checks, ventilator management and hemodynamic monitoring.     O:  REMAINS INTUBATED   HEMORRHAGIC SHOCK     T(C): 38.2 (06-05-25 @ 08:00), Max: 38.2 (06-05-25 @ 08:00)  HR: 81 (06-05-25 @ 13:00) (63 - 95)  BP: 117/71 (06-05-25 @ 13:00) (77/16 - 162/98)  RR: 22 (06-05-25 @ 13:00) (0 - 28)  SpO2: 100% (06-05-25 @ 13:00) (99% - 100%)  06-04-25 @ 07:01  -  06-05-25 @ 07:00  --------------------------------------------------------  IN: 1850 mL / OUT: 955 mL / NET: 895 mL    06-05-25 @ 07:01  -  06-05-25 @ 13:41  --------------------------------------------------------  IN: 761 mL / OUT: 745 mL / NET: 16 mL    acetaminophen   IVPB .. 1000 milliGRAM(s) IV Intermittent every 6 hours  chlorhexidine 2% Cloths 1 Application(s) Topical daily  HYDROmorphone  Injectable 1 milliGRAM(s) IV Push every 3 hours PRN  HYDROmorphone  Injectable 0.5 milliGRAM(s) IV Push every 3 hours PRN  insulin lispro (ADMELOG) corrective regimen sliding scale   SubCutaneous every 6 hours  lactated ringers. 1000 milliLiter(s) IV Continuous <Continuous>  naloxone Injectable 0.1 milliGRAM(s) IV Push every 3 minutes PRN  norepinephrine Infusion 0.08 MICROgram(s)/kG/Min IV Continuous <Continuous>  ondansetron Injectable 4 milliGRAM(s) IV Push every 6 hours PRN  pantoprazole  Injectable 40 milliGRAM(s) IV Push daily  sodium chloride 0.9% lock flush 3 milliLiter(s) IV Push every 8 hours  Mode: CPAP with PS, FiO2: 30, PEEP: 6, PS: 5, MAP: 9, PIP: 12    EXAM: ON PRECEDEX   following simple commands, sleepy, moving all 4 extremities stronger on the left side       LABS:  Na: 139 (06-05 @ 04:45), 138 (06-05 @ 00:42), 136 (06-04 @ 21:08)  K: 4.5 (06-05 @ 04:45), 4.2 (06-05 @ 00:42), 4.9 (06-04 @ 21:08)  Cl: 107 (06-05 @ 04:45), 106 (06-05 @ 00:42), 106 (06-04 @ 21:08)  CO2: 18 (06-05 @ 04:45), 16 (06-05 @ 00:42), 16 (06-04 @ 21:08)  BUN: 35 (06-05 @ 04:45), 34 (06-05 @ 00:42), 34 (06-04 @ 21:08)  Cr: 0.84 (06-05 @ 04:45), 0.80 (06-05 @ 00:42), 0.75 (06-04 @ 21:08)  Glu: 153(06-05 @ 04:45), 199(06-05 @ 00:42), 270(06-04 @ 21:08)    Hgb: 9.7 (06-05 @ 12:45), 8.7 (06-05 @ 08:45), 10.9 (06-05 @ 00:42), 11.0 (06-04 @ 21:08)  Hct: 27.3 (06-05 @ 12:45), 25.1 (06-05 @ 08:45), 31.1 (06-05 @ 00:42), 32.7 (06-04 @ 21:08)  WBC: 16.35 (06-05 @ 12:45), 19.57 (06-05 @ 08:45), 25.05 (06-05 @ 00:42), 24.39 (06-04 @ 21:08)  Plt: 128 (06-05 @ 12:45), 152 (06-05 @ 08:45), 193 (06-05 @ 00:42), 184 (06-04 @ 21:08)    INR: 1.16 06-05-25 @ 00:42, 1.11 06-04-25 @ 21:08  PTT: 23.2 06-05-25 @ 00:42, 23.6 06-04-25 @ 21:08            POD 1 from T3-pelvis PSF per neurosurgery. Myocutaneous flap closure with plastic surgery. JPx3. R and L inferior JPs deep, superior RADHA superficial.  neuro checks q 1 hr   monitor drain output, managed by plastics   please re-examine after extubation off precedex, if right side remains weaker compared to left side  please call us back   acute respiratory failure, intubated, chest xray clear, lines in good position, PS trial, tolerating it, please do cuff leak before extubation given prolonged prone position during surgery   hypotensive likely from hemorrhagic shock on NE from intra-op blood loss, s/p 1 packed RBC, would transfuse as needed, on NS at 100 ml/hr, can give 500 ml NS bolus repeat CBC , monitor hgb abd platlets   NSR  Afib eliquis held given POD 1 from surgery , please get ECG given hypotension   NPO for extubation, PPI while intubated, senna and miralax   hold chemoprophylaxis , would start tomorrow     30 critical care time   plan discussed with NS and SICU team   please call us back with any questions

## 2025-06-05 NOTE — PROGRESS NOTE ADULT - PROBLEM SELECTOR PLAN 1
- ancef x24h postop  - drains per plastics   - standing postop films when able   - trend Hg, arterial pressures   - TLSO brace when OOB  - screening lower extremity dopplers today 6/5    Pager [phone]   Case discussed with attending neurosurgeon Dr. Stroud - ancef x24h postop  - drains per plastics   - standing postop films when able   - trend Hg, arterial pressures   - TLSO brace when OOB  - screening lower extremity dopplers today 6/5  - pain control PRN  - q1h neuro checks     Pager [phone]   Case discussed with attending neurosurgeon Dr. Stroud - ancef x24h postop  - drains per plastics, monitor outputs  - standing postop films when able   - trend Hg, arterial pressures   - TLSO brace when OOB  - screening lower extremity dopplers today 6/5  - pain control PRN  - q1h neuro checks     Pager [phone]   Case discussed with attending neurosurgeon Dr. Stroud

## 2025-06-05 NOTE — CHART NOTE - NSCHARTNOTEFT_GEN_A_CORE
Pain management contacted, requesting a consult for IV PCA.  s/p T4-Pelvic posterior laminectomy and fusion on 6/4.  H/o of peripheral neuropathies and sees an outpatient chronic pain management provider Dr. Bertha Brantley. Pt extubated and precedex discontinued about 1.5 hrs ago.  Pt seen laying in bed.  Difficult to arouse and unable to answer any questions.  Pt is not a candidate at this time for IV PCA.  IV Dilaudid PRN doses to be continued. Spoke with primary team who agrees with plan. Pain management contacted, requesting a consult for IV PCA.  s/p T4-Pelvic posterior laminectomy and fusion on 6/4.  H/o of peripheral neuropathies and sees an outpatient chronic pain management provider Dr. Bertha Brantley. Pt extubated and Precedex discontinued about 1.5 hrs ago.  Pt seen laying in bed.  Difficult to arouse and unable to answer any questions.  Pt is not a candidate at this time for IV PCA.  IV Dilaudid PRN doses to be continued. Spoke with primary team who agrees with plan.    [facility] Reference # [id number]    Last rx filled:  5/12/25- Lyrica 100 mg TID  5/22/25- Dilaudid 4mg, Q 6hr, disp 56 x 14 days

## 2025-06-05 NOTE — PROGRESS NOTE ADULT - SUBJECTIVE AND OBJECTIVE BOX
Plastic Surgery Progress Note (pg [facility]: [id number], [facility]: [phone])    SUBJECTIVE  The patient was seen and examined. No acute events overnight. Pain controlled, afebrile w/ stable vitals.     OBJECTIVE  ___________________________________________________  VITAL SIGNS / I&O's   Vital Signs Last 24 Hrs  T(C): 36.6 (05 Jun 2025 04:00), Max: 36.6 (05 Jun 2025 04:00)  T(F): 97.8 (05 Jun 2025 04:00), Max: 97.8 (05 Jun 2025 04:00)  HR: 81 (05 Jun 2025 05:00) (63 - 95)  BP: 136/73 (05 Jun 2025 05:00) (77/16 - 162/98)  BP(mean): 93 (05 Jun 2025 05:00) (32 - 115)  RR: 16 (05 Jun 2025 05:00) (0 - 21)  SpO2: 100% (05 Jun 2025 05:00) (99% - 100%)    Parameters below as of 04 Jun 2025 22:00  Patient On (Oxygen Delivery Method): ventilator      Mode: AC/ CMV (Assist Control/ Continuous Mandatory Ventilation)  RR (machine): 16  TV (machine): 500  FiO2: 30  PEEP: 6  ITime: 0.91  MAP: 10  PIP: 20      04 Jun 2025 07:01  -  05 Jun 2025 07:00  --------------------------------------------------------  IN:    IV PiggyBack: 250 mL    Lactated Ringers: 500 mL    Lactated Ringers Bolus: 500 mL  Total IN: 1250 mL    OUT:    Bulb (mL): 100 mL    Bulb (mL): 10 mL    Bulb (mL): 300 mL    Indwelling Catheter - Urethral (mL): 345 mL  Total OUT: 755 mL    Total NET: 495 mL        ___________________________________________________  PHYSICAL EXAM    -- CONSTITUTIONAL: NAD, lying in bed  -- NEURO: Awake, alert  -- HEENT: NC/AT, mucous membranes moist  -- NECK: Soft, no asymmetry  -- PULM: Non-labored respirations, equal chest rise bilaterally  -- BACK: Soft, flat. Dressing with minimal strikethrough. JPx3 sanguinous>SS.  -- EXTREMITIES: Warm and well perfused  -- PSYCH: Affect normal, A&Ox3      ___________________________________________________  LABS                        10.9   25.05 )-----------( 193      ( 05 Jun 2025 00:42 )             31.1     05 Jun 2025 04:45    139    |  107    |  35     ----------------------------<  153    4.5     |  18     |  0.84     Ca    8.2        05 Jun 2025 04:45  Phos  4.7       05 Jun 2025 04:45  Mg     1.50      05 Jun 2025 04:45      PT/INR - ( 05 Jun 2025 00:42 )   PT: 13.8 sec;   INR: 1.16 ratio         PTT - ( 05 Jun 2025 00:42 )  PTT:23.2 sec  CAPILLARY BLOOD GLUCOSE      POCT Blood Glucose.: 172 mg/dL (05 Jun 2025 04:50)  POCT Blood Glucose.: 238 mg/dL (05 Jun 2025 00:39)        Urinalysis Basic - ( 05 Jun 2025 04:45 )    Color: x / Appearance: x / SG: x / pH: x  Gluc: 153 mg/dL / Ketone: x  / Bili: x / Urobili: x   Blood: x / Protein: x / Nitrite: x   Leuk Esterase: x / RBC: x / WBC x   Sq Epi: x / Non Sq Epi: x / Bacteria: x      ___________________________________________________  MICRO  Recent Cultures:    ___________________________________________________  MEDICATIONS  (STANDING):  acetaminophen   IVPB .. 1000 milliGRAM(s) IV Intermittent every 6 hours  chlorhexidine 0.12% Liquid 15 milliLiter(s) Oral Mucosa every 12 hours  chlorhexidine 2% Cloths 1 Application(s) Topical daily  dexMEDEtomidine Infusion 0.2 MICROgram(s)/kG/Hr (4.43 mL/Hr) IV Continuous <Continuous>  enoxaparin Injectable 40 milliGRAM(s) SubCutaneous every 24 hours  insulin lispro (ADMELOG) corrective regimen sliding scale   SubCutaneous every 6 hours  lactated ringers. 1000 milliLiter(s) (100 mL/Hr) IV Continuous <Continuous>  magnesium sulfate  IVPB 2 Gram(s) IV Intermittent once  magnesium sulfate  IVPB 2 Gram(s) IV Intermittent once  norepinephrine Infusion 0.08 MICROgram(s)/kG/Min (13.3 mL/Hr) IV Continuous <Continuous>  pantoprazole  Injectable 40 milliGRAM(s) IV Push daily  sodium chloride 0.9% lock flush 3 milliLiter(s) IV Push every 8 hours    MEDICATIONS  (PRN):  HYDROmorphone  Injectable 1 milliGRAM(s) IV Push every 3 hours PRN Severe Pain (7 - 10)  HYDROmorphone  Injectable 0.5 milliGRAM(s) IV Push every 3 hours PRN Moderate Pain (4 - 6)  naloxone Injectable 0.1 milliGRAM(s) IV Push every 3 minutes PRN For ANY of the following changes in patient status:  A. RR LESS THAN 10 breaths per minute, B. Oxygen saturation LESS THAN 90%, C. Sedation score of 6  ondansetron Injectable 4 milliGRAM(s) IV Push every 6 hours PRN Nausea

## 2025-06-05 NOTE — PROGRESS NOTE ADULT - ASSESSMENT
76 y/o female PMH of HTN, SILVANA, PAF on Eliquis PE (in 1980s) OA, hiatal hernia (repair 2023) gastroparesis, peripheral neuropathy, SILVANA on CPAP, and extensive prior spinal surgery (last in 09/2024-T9 partial corpectomy, had paraspinal abscess drained in 10/2024) presented as SDA for T3 to pelvis fusion and instrumention with L4 PSO on 6/4/25.     6/4 OR for T3 to pelvis fusion and instrumention with L4 PSO, with Dr. Stroud (neurosurgery), Dr. Gray (ortho), and Dr. Bolton (plastics), closed with nylons. 4uPRBCs given intraop, has tim bui, drains per plastics. Low pressures at end of case, pt required increasing phenylephrine intraop.    6/5 POD 1, intubated and sedated (partially weaned for exam), MAEx4 spont under wrist restraints, incision c/d/i. On ancef 24h postop. Postop Hg 11.0, ramya /70. 76 y/o female PMH of HTN, SILVANA, PAF on Eliquis PE (in 1980s) OA, hiatal hernia (repair 2023) gastroparesis, peripheral neuropathy, SILVANA on CPAP, and extensive prior spinal surgery (last in 09/2024-T9 partial corpectomy, had paraspinal abscess drained in 10/2024) presented as SDA for T3 to pelvis fusion and instrumention with L4 PSO on 6/4/25.     6/4 OR for T3 to pelvis fusion and instrumention with L4 PSO, with Dr. Stroud (neurosurgery), Dr. Gray (ortho), and Dr. Bolton (plastics), closed with nylons. 4uPRBCs given intraop, has tim bui, drains per plastics. Low pressures at end of case, pt required increasing phenylephrine intraop. 500 cc LR bolus given for increasing vasopressor requirements and elevated lactate.     6/5 POD 1, intubated and sedated (partially weaned for exam), MAEx4 spont under wrist restraints, incision c/d/i. On ancef 24h postop. Postop Hg 11.0, ramya /70.

## 2025-06-05 NOTE — PROGRESS NOTE ADULT - SUBJECTIVE AND OBJECTIVE BOX
SUBJECTIVE  Intubated and sedated on pressors.     OBJECTIVE  Vital Signs Last 24 Hrs  T(C): 36.6 (05 Jun 2025 04:00), Max: 36.6 (05 Jun 2025 04:00)  T(F): 97.8 (05 Jun 2025 04:00), Max: 97.8 (05 Jun 2025 04:00)  HR: 81 (05 Jun 2025 05:00) (63 - 92)  BP: 136/73 (05 Jun 2025 05:00) (96/69 - 162/98)  BP(mean): 93 (05 Jun 2025 05:00) (44 - 115)  RR: 16 (05 Jun 2025 05:00) (10 - 21)  SpO2: 100% (05 Jun 2025 05:00) (99% - 100%)    Parameters below as of 04 Jun 2025 22:00  Patient On (Oxygen Delivery Method): ventilator        PHYSICAL EXAM  Gen: sedated  Resp: intubated  Spine:  RADHA x2 w SS output     LEs:  Calves soft  WWP b/l    LABS                        10.9   25.05 )-----------( 193      ( 05 Jun 2025 00:42 )             31.1     06-05    139  |  107  |  35[H]  ----------------------------<  153[H]  4.5   |  18[L]  |  0.84    Ca    8.2[L]      05 Jun 2025 04:45  Phos  4.7     06-05  Mg     1.50     06-05      PT/INR - ( 05 Jun 2025 00:42 )   PT: 13.8 sec;   INR: 1.16 ratio         PTT - ( 05 Jun 2025 00:42 )  PTT:23.2 sec    ASSESSMENT & PLAN  75yFemale s/p T4-pelvis post lami, PSF  -mgmt per Nsx  -pain control  -incentive spirometry  -DVT ppx: SCDs, lovenox   -PT/OT  -care per SICU    For all questions related to patient care, please reach out via the on-call pager.*     Peace Garcia PGY2  Orthopedic Surgery  [facility]: [phone]  [facility]: [phone]  [facility] [place]: [phone]

## 2025-06-05 NOTE — PROGRESS NOTE ADULT - ASSESSMENT
74 y/o female PMH of HTN, SILVANA, PAF on Eliquis PE (in 1980s) OA, hiatal hernia (repair 2023) gastroparesis, peripheral neuropathy, SILVANA on CPAP, and extensive prior spinal surgery now s/p 6/4 Open lumbar laminectomy, Posterior thoracic laminectomy, T3-pelvis decompression/fusion with L4 pedicle osteotomy and Myocutaneous flap closure with plastic surgery. Patient required increasing amounts of phenylephrine intra-op and was therefore not extubated at case end. SICU consulted for Q1 neuro-checks, ventilator management and hemodynamic monitoring.     NEUROLOGIC   - Pain control: IV tylenol and PRN dilaudid  - Sedation: Precedex  - Q1 neuro-checks  - Post-op films when able to stand  - TLSO brace when able    RESPIRATORY   - Monitor SpO2 goal >92%, Mechanical Ventilation V/AC: 12/500/6/50%  - Wean to extubate    CARDIOVASCULAR   - Monitor hemodynamics, MAP goal > 65  - Wean cullen as tolerated  - Trend lactate    GASTROINTESTINAL   - Diet: NPO  - Protonix    /RENAL   - IV fluids: LR @ 100cc/hr  - Maintain dumont catheter, strict Is/Os  - Monitor electrolytes, replete PRN    HEMATOLOGIC  - Monitor H/H   - DVT ppx: SCD's  - HOLDing chemical DVT ppx  - HOLDing home eliquis  - Screening BLE dopplers pending    INFECTIOUS DISEASE  - Monitor fever / WBC  - Ancef x 24 hrs    ENDOCRINE  - Monitor gluc    LINES  - Left axillary A-line (6/4 - )  - Dumont (6/4 - )  - RADHA x 3 (back, 2 deep, 1 superficial)  - PIV     DISPO: SICU   76 y/o female PMH of HTN, SILVANA, PAF on Eliquis PE (in 1980s) OA, hiatal hernia (repair 2023) gastroparesis, peripheral neuropathy, SILVANA on CPAP, and extensive prior spinal surgery now s/p 6/4 Open lumbar laminectomy, Posterior thoracic laminectomy, T3-pelvis decompression/fusion with L4 pedicle osteotomy and Myocutaneous flap closure with plastic surgery. Patient required increasing amounts of phenylephrine intra-op and was therefore not extubated at case end. SICU consulted for Q1 neuro-checks, ventilator management and hemodynamic monitoring.     NEUROLOGIC   - Pain control: IV tylenol and PRN dilaudid  - Sedation: Precedex  - Q1 neuro-checks  - Post-op films when able to stand  - TLSO brace when able    RESPIRATORY   - Monitor SpO2 goal >92%, Mechanical Ventilation V/AC: 12/500/6/50%  - Wean to extubate    CARDIOVASCULAR   - Monitor hemodynamics, MAP goal > 65  - Hammad, levo, wean as tolerating  - Trend lactate    GASTROINTESTINAL   - Diet: NPO  - Protonix    /RENAL   - IV fluids: LR @ 100cc/hr  - Maintain dumont catheter, strict Is/Os  - Monitor electrolytes, replete PRN    HEMATOLOGIC  - Monitor H/H   - DVT ppx: SCD's  - HOLDing chemical DVT ppx  - HOLDing home eliquis  - Screening BLE dopplers pending    INFECTIOUS DISEASE  - Monitor fever / WBC  - Ancef x 24 hrs    ENDOCRINE  - Monitor gluc    LINES  - Left axillary A-line (6/4 - )  - Dumont (6/4 - )  - RADHA x 3 (back, 2 deep, 1 superficial)  - PIV     DISPO: SICU

## 2025-06-05 NOTE — CONSULT NOTE ADULT - SUBJECTIVE AND OBJECTIVE BOX
Patient is a 75y old  Female who presents with a chief complaint of SDA (05 Jun 2025 00:39)      HPI:  76 y/o female PMH of HTN, SILVANA, PAF on Eliquis PE (in 1980s) OA, hiatal hernia (repair 2023) gastroparesis, peripheral neuropathy, SILVANA on CPAP, and extensive prior spinal surgery most recently receiving thoracic lumbar fusion with cage with Dr. Stroud on 9/24, after a fall, complicated by sepsis, and paroxysmal atrial fibrillation, development of pleural effusions, symptomatic orthostatic hypotension, urinary retention, post op anemia, chest pain with spontaneous resolution, and paraspinal abscess s/p paraspinal collection aspiration on 10/25/24 presents to presurgical testing with diagnosis of unspecified fracture thoracic vertebrae, scoliosis, and postlaminectomy syndrome. Pt twisted her back a few weeks ago resulting in vertebral fracture. Pt is scheduled for T4-Pelvis posterior laminectomy and fusion with posterior instrumentation. Possible corpectomy T9. Lumbar osteotomy and illiac fixation.       (22 May 2025 09:58)      REVIEW OF SYSTEMS  Constitutional - No fever, No weight loss, No fatigue  HEENT - No eye pain, No visual disturbances, No difficulty hearing, No tinnitus, No vertigo, No neck pain  Respiratory - No cough, No wheezing, No shortness of breath  Cardiovascular - No chest pain, No palpitations  Gastrointestinal - No abdominal pain, No nausea, No vomiting, No diarrhea, No constipation  Genitourinary - No dysuria, No frequency, No hematuria, No incontinence  Neurological - No headaches, No memory loss, No loss of strength, No numbness, No tremors  Skin - No itching, No rashes, No lesions   Endocrine - No temperature intolerance  Musculoskeletal - No joint pain, No joint swelling, No muscle pain  Psychiatric - No depression, No anxiety    PAST MEDICAL & SURGICAL HISTORY  H/O seasonal allergies    History of pulmonary embolism    Restless leg syndrome    GERD (gastroesophageal reflux disease)    HTN (hypertension)    OA (osteoarthritis)    Fungal infection of skin    H/O scoliosis    Essential hypertension    Depression    Osteoporosis    Right hip pain    2019 novel coronavirus disease (COVID-19)    Hiatal hernia    History of chronic pain    Peripheral neuropathy    Chronic constipation    Chronic GERD    Closed fracture of thoracic vertebra    MSSA (methicillin susceptible Staphylococcus aureus) infection    SILVANA on CPAP    Paroxysmal atrial fibrillation    PFO (patent foramen ovale)    Hearing loss    Postlaminectomy syndrome    Delayed gastric emptying    Anemia    DM2 (diabetes mellitus, type 2)    S/P repair of paraesophageal hernia    S/P spinal fusion    Scoliosis    S/P spinal surgery    Removal of pin, plate, abigail, or screw    S/P hysterectomy    S/P hip replacement    S/P shoulder surgery    S/P dilatation and curettage    H/O arthroscopy of knee    H/O total knee replacement, right    S/P cataract surgery    History of bilateral hip replacements    History of repair of hiatal hernia        SOCIAL HISTORY  Smoking - Denied  EtOH - Denied   Drugs - Denied    FUNCTIONAL HISTORY  Lives   Independent    CURRENT FUNCTIONAL STATUS      FAMILY HISTORY   Family history of abdominal aortic aneurysm (AAA) (Mother)        RECENT LABS/IMAGING  CBC Full  -  ( 05 Jun 2025 08:45 )  WBC Count : 19.57 K/uL  RBC Count : 2.99 M/uL  Hemoglobin : 8.7 g/dL  Hematocrit : 25.1 %  Platelet Count - Automated : 152 K/uL  Mean Cell Volume : 83.9 fL  Mean Cell Hemoglobin : 29.1 pg  Mean Cell Hemoglobin Concentration : 34.7 g/dL  Auto Neutrophil # : x  Auto Lymphocyte # : x  Auto Monocyte # : x  Auto Eosinophil # : x  Auto Basophil # : x  Auto Neutrophil % : x  Auto Lymphocyte % : x  Auto Monocyte % : x  Auto Eosinophil % : x  Auto Basophil % : x    06-05    139  |  107  |  35[H]  ----------------------------<  153[H]  4.5   |  18[L]  |  0.84    Ca    8.2[L]      05 Jun 2025 04:45  Phos  4.7     06-05  Mg     1.50     06-05      Urinalysis Basic - ( 05 Jun 2025 04:45 )    Color: x / Appearance: x / SG: x / pH: x  Gluc: 153 mg/dL / Ketone: x  / Bili: x / Urobili: x   Blood: x / Protein: x / Nitrite: x   Leuk Esterase: x / RBC: x / WBC x   Sq Epi: x / Non Sq Epi: x / Bacteria: x        VITALS  T(C): 38.2 (06-05-25 @ 08:00), Max: 38.2 (06-05-25 @ 08:00)  HR: 78 (06-05-25 @ 09:15) (63 - 95)  BP: 91/75 (06-05-25 @ 09:00) (77/16 - 162/98)  RR: 16 (06-05-25 @ 09:15) (0 - 21)  SpO2: 100% (06-05-25 @ 09:15) (99% - 100%)  Wt(kg): --    ALLERGIES  Dilantin (Urticaria)  penicillins (Urticaria)  erythromycin (Stomach Upset; Vomiting; Nausea)      MEDICATIONS   acetaminophen   IVPB .. 1000 milliGRAM(s) IV Intermittent every 6 hours  chlorhexidine 0.12% Liquid 15 milliLiter(s) Oral Mucosa every 12 hours  chlorhexidine 2% Cloths 1 Application(s) Topical daily  dexMEDEtomidine Infusion 0.2 MICROgram(s)/kG/Hr IV Continuous <Continuous>  HYDROmorphone  Injectable 1 milliGRAM(s) IV Push every 3 hours PRN  HYDROmorphone  Injectable 0.5 milliGRAM(s) IV Push every 3 hours PRN  insulin lispro (ADMELOG) corrective regimen sliding scale   SubCutaneous every 6 hours  lactated ringers. 1000 milliLiter(s) IV Continuous <Continuous>  magnesium sulfate  IVPB 2 Gram(s) IV Intermittent once  naloxone Injectable 0.1 milliGRAM(s) IV Push every 3 minutes PRN  norepinephrine Infusion 0.08 MICROgram(s)/kG/Min IV Continuous <Continuous>  ondansetron Injectable 4 milliGRAM(s) IV Push every 6 hours PRN  pantoprazole  Injectable 40 milliGRAM(s) IV Push daily  sodium chloride 0.9% lock flush 3 milliLiter(s) IV Push every 8 hours      ----------------------------------------------------------------------------------------  PHYSICAL EXAM  Constitutional - NAD, Comfortable  HEENT - NCAT, EOMI  Neck - Supple, No limited ROM  Chest - no respiratory distress   Cardiovascular - RRR, S1S2   Abdomen -  Soft, NTND  Extremities - No C/C/E, No calf tenderness   Neurologic Exam -                    Cognitive - Awake, Alert, AAO to self, place, date, year, situation     Communication - Fluent, No dysarthria     Cranial Nerves - CN 2-12 intact     Motor - No focal deficits                    LEFT    UE - ShAB 5/5, EF 5/5, EE 5/5, WE 5/5,  5/5                    RIGHT UE - ShAB 5/5, EF 5/5, EE 5/5, WE 5/5,  5/5                    LEFT    LE - HF 5/5, KE 5/5, DF 5/5, PF 5/5                    RIGHT LE - HF 5/5, KE 5/5, DF 5/5, PF 5/5        Sensory - Intact to LT     Reflexes - DTR Intact, No primitive reflexive     Coordination - FTN intact     OculoVestibular - No saccades, No nystagmus, VOR         Balance - WNL Static  Psychiatric - Mood stable, Affect WNL  ----------------------------------------------------------------------------------------  ASSESSMENT/PLAN  s/p lumbar laminectomy  PT for bed mobility, transfers, ambulation as tolerated  out of bed to chair as tolerated   Pain -  DVT PPX -   Rehab - incomplete note, consult in progress    Total time spent to review relevant records and imaging results, examine patient, complete documentation, and when applicable discuss the case with the patient, family, , social workers, and medical team:  55 minutes Patient is a 75y old  Female who presents with a chief complaint of SDA (05 Jun 2025 00:39)      HPI:  74 y/o female PMH of HTN, SILVANA, PAF on Eliquis PE (in 1980s) OA, hiatal hernia (repair 2023) gastroparesis, peripheral neuropathy, SILVANA on CPAP, and extensive prior spinal surgery most recently receiving thoracic lumbar fusion with cage with Dr. Stroud on 9/24, after a fall, complicated by sepsis, and paroxysmal atrial fibrillation, development of pleural effusions, symptomatic orthostatic hypotension, urinary retention, post op anemia, chest pain with spontaneous resolution, and paraspinal abscess s/p paraspinal collection aspiration on 10/25/24 presents to presurgical testing with diagnosis of unspecified fracture thoracic vertebrae, scoliosis, and postlaminectomy syndrome. Pt twisted her back a few weeks ago resulting in vertebral fracture. Pt is scheduled for T4-Pelvis posterior laminectomy and fusion with posterior instrumentation. Possible corpectomy T9. Lumbar osteotomy and illiac fixation.       (22 May 2025 09:58)    Had prior surgery and went to [facility] Fall 2024.  Lives with her son in [facility] with elevator.  Ambulates with rollator, has difficulty with balance.     hg 8.7 today.     REVIEW OF SYSTEMS  Constitutional - No fever, No weight loss, No fatigue  HEENT - No eye pain, No visual disturbances, No difficulty hearing, No tinnitus, No vertigo, No neck pain  Respiratory - No cough, No wheezing, No shortness of breath  Cardiovascular - No chest pain, No palpitations  Gastrointestinal - No abdominal pain, No nausea, No vomiting, No diarrhea, No constipation  Genitourinary - No dysuria, No frequency, No hematuria, No incontinence  Neurological - No headaches, No memory loss, No loss of strength, No numbness, No tremors  Skin - No itching, No rashes, No lesions   Endocrine - No temperature intolerance  Musculoskeletal - No joint pain, No joint swelling, No muscle pain  Psychiatric - No depression, No anxiety    PAST MEDICAL & SURGICAL HISTORY  H/O seasonal allergies    History of pulmonary embolism    Restless leg syndrome    GERD (gastroesophageal reflux disease)    HTN (hypertension)    OA (osteoarthritis)    Fungal infection of skin    H/O scoliosis    Essential hypertension    Depression    Osteoporosis    Right hip pain    2019 novel coronavirus disease (COVID-19)    Hiatal hernia    History of chronic pain    Peripheral neuropathy    Chronic constipation    Chronic GERD    Closed fracture of thoracic vertebra    MSSA (methicillin susceptible Staphylococcus aureus) infection    SILVANA on CPAP    Paroxysmal atrial fibrillation    PFO (patent foramen ovale)    Hearing loss    Postlaminectomy syndrome    Delayed gastric emptying    Anemia    DM2 (diabetes mellitus, type 2)    S/P repair of paraesophageal hernia    S/P spinal fusion    Scoliosis    S/P spinal surgery    Removal of pin, plate, abigail, or screw    S/P hysterectomy    S/P hip replacement    S/P shoulder surgery    S/P dilatation and curettage    H/O arthroscopy of knee    H/O total knee replacement, right    S/P cataract surgery    History of bilateral hip replacements    History of repair of hiatal hernia        SOCIAL HISTORY  Smoking - Denied  EtOH - Denied   Drugs - Denied    FUNCTIONAL HISTORY  Lives with son in condo   Independent    CURRENT FUNCTIONAL STATUS  pending    FAMILY HISTORY   Family history of abdominal aortic aneurysm (AAA) (Mother)        RECENT LABS/IMAGING  CBC Full  -  ( 05 Jun 2025 08:45 )  WBC Count : 19.57 K/uL  RBC Count : 2.99 M/uL  Hemoglobin : 8.7 g/dL  Hematocrit : 25.1 %  Platelet Count - Automated : 152 K/uL  Mean Cell Volume : 83.9 fL  Mean Cell Hemoglobin : 29.1 pg  Mean Cell Hemoglobin Concentration : 34.7 g/dL  Auto Neutrophil # : x  Auto Lymphocyte # : x  Auto Monocyte # : x  Auto Eosinophil # : x  Auto Basophil # : x  Auto Neutrophil % : x  Auto Lymphocyte % : x  Auto Monocyte % : x  Auto Eosinophil % : x  Auto Basophil % : x    06-05    139  |  107  |  35[H]  ----------------------------<  153[H]  4.5   |  18[L]  |  0.84    Ca    8.2[L]      05 Jun 2025 04:45  Phos  4.7     06-05  Mg     1.50     06-05      Urinalysis Basic - ( 05 Jun 2025 04:45 )    Color: x / Appearance: x / SG: x / pH: x  Gluc: 153 mg/dL / Ketone: x  / Bili: x / Urobili: x   Blood: x / Protein: x / Nitrite: x   Leuk Esterase: x / RBC: x / WBC x   Sq Epi: x / Non Sq Epi: x / Bacteria: x        VITALS  T(C): 38.2 (06-05-25 @ 08:00), Max: 38.2 (06-05-25 @ 08:00)  HR: 78 (06-05-25 @ 09:15) (63 - 95)  BP: 91/75 (06-05-25 @ 09:00) (77/16 - 162/98)  RR: 16 (06-05-25 @ 09:15) (0 - 21)  SpO2: 100% (06-05-25 @ 09:15) (99% - 100%)  Wt(kg): --    ALLERGIES  Dilantin (Urticaria)  penicillins (Urticaria)  erythromycin (Stomach Upset; Vomiting; Nausea)      MEDICATIONS   acetaminophen   IVPB .. 1000 milliGRAM(s) IV Intermittent every 6 hours  chlorhexidine 0.12% Liquid 15 milliLiter(s) Oral Mucosa every 12 hours  chlorhexidine 2% Cloths 1 Application(s) Topical daily  dexMEDEtomidine Infusion 0.2 MICROgram(s)/kG/Hr IV Continuous <Continuous>  HYDROmorphone  Injectable 1 milliGRAM(s) IV Push every 3 hours PRN  HYDROmorphone  Injectable 0.5 milliGRAM(s) IV Push every 3 hours PRN  insulin lispro (ADMELOG) corrective regimen sliding scale   SubCutaneous every 6 hours  lactated ringers. 1000 milliLiter(s) IV Continuous <Continuous>  magnesium sulfate  IVPB 2 Gram(s) IV Intermittent once  naloxone Injectable 0.1 milliGRAM(s) IV Push every 3 minutes PRN  norepinephrine Infusion 0.08 MICROgram(s)/kG/Min IV Continuous <Continuous>  ondansetron Injectable 4 milliGRAM(s) IV Push every 6 hours PRN  pantoprazole  Injectable 40 milliGRAM(s) IV Push daily  sodium chloride 0.9% lock flush 3 milliLiter(s) IV Push every 8 hours      ----------------------------------------------------------------------------------------  PHYSICAL EXAM  Constitutional - NAD, Comfortable  + drains  Head: + intubated    Chest - no respiratory distress   Cardiovascular - RRR, S1S2   Abdomen -  Soft, NTND  Extremities - in mitten restraints, unable to squeeze hands b/l  Neurologic Exam -                    Cognitive - sleepy, awakens to voice, follows some commands     Communication - Fluent, No dysarthria     Cranial Nerves - grossly intact, patient sleepy , closing eyes.      Motor -    limited exam, patient moving lower extremities 2/5                     unable to perform  b/l hands     Sensory - Intact to LT       Balance - WNL Static  Psychiatric - Mood stable, Affect WNL  ----------------------------------------------------------------------------------------  ASSESSMENT/PLAN  s/p T4-pelvis post lami, PSF on 6/4  PT for bed mobility, transfers, ambulation as tolerated  out of bed to chair as tolerated   please request OT evaluation  Pain -dilaudid iv prn  DVT PPX - lovenox  Rehab -pending progress with PT, patient has previously attended inpatient rehab program at [facility], per conversation with her son she would like to return there.   Will monitor for improvement  Total time spent to review relevant records and imaging results, examine patient, complete documentation, and when applicable discuss the case with the patient, family, , social workers, and medical team:  55 minutes

## 2025-06-05 NOTE — PATIENT PROFILE ADULT - SURGICAL SITE DESCRIPTION
S: 76 y.o. female presents today for:   Gait issues and knee pain, bilateral. Walks straight, but difficulty walking upstairs. 13 lb weigh loss in 2 months  Dementia- daughter is [facility] [email or web address]. POA, will not take her meds, does spit them out. Sees Dr. Javi Leon. Unable to perform ADLs. Does use RR on her own. Dementia is stable over the last year. HTN- elevated, not taking her meds, in need of refills, recent labs wnl. No CP, diaphoresis, SOB, palp, HA, visual issues. O: VS: BP (!) 191/99 (Site: Left Upper Arm, Position: Sitting, Cuff Size: Medium Adult)   Pulse 84   Temp 98 °F (36.7 °C) (Temporal)   Ht 5' 6\" (1.676 m)   Wt 120 lb (54.4 kg)   SpO2 100%   BMI 19.37 kg/m²   NAD, appropriate affect for mood  CV:  RRR, no murmur  Resp: CTAB  Ext- DPP-B, no clubbing, cyanosis, edema, right knee protuberant bony mass    Impression/Plan:   1) gait instability- HHC with PT/OT, xray knee  2) HTN- restart meds  3) dementia- siobhan  4) weight loss- labs normal, calorie dense foods. Attending Physician Statement  I have discussed the case, including pertinent history and exam findings with the resident. I agree with the documented assessment and plan.       Debby Ortega,  back

## 2025-06-05 NOTE — PROGRESS NOTE ADULT - ASSESSMENT
ASSESSMENT/PLAN:   VIRGINIA HUFFMAN is a 75yFemale s/p PSF with closure by PRS 06/04.     - Gauze/tegaderm dressing down on POD2  - Nylons to remain on until followup with Dr. Bolton  - Pain control  - Advance diet as tolerated, ensure pt taking adequate protein  - Appreciate rest of care per primary team    Plastic Surgery   [facility]: [id number]  [facility]: [phone]

## 2025-06-05 NOTE — PROGRESS NOTE ADULT - SUBJECTIVE AND OBJECTIVE BOX
PAST 24HR EVENTS:  POD 1 s/p T3 to pelvis fusion and instrumention with L4 PSO, with Dr. Stroud (neurosurgery), Dr. Gray (ortho), and Dr. Bolton (plastics), closed with nylons. 4uPRBCs given intraop, has ramya, dumont, drains per plastics.     Vital Signs Last 24 Hrs  T(C): 35.3 (04 Jun 2025 19:05), Max: 36.6 (04 Jun 2025 05:51)  T(F): 95.5 (04 Jun 2025 19:05), Max: 97.9 (04 Jun 2025 05:51)  HR: 87 (04 Jun 2025 22:42) (63 - 87)  BP: 107/33 (04 Jun 2025 20:00) (96/69 - 146/73)  BP(mean): 44 (04 Jun 2025 20:00) (44 - 79)  RR: 21 (04 Jun 2025 22:00) (10 - 21)  SpO2: 100% (04 Jun 2025 22:42) (92% - 100%)    Parameters below as of 04 Jun 2025 22:00  Patient On (Oxygen Delivery Method): ventilator        MEDS:   acetaminophen   IVPB .. 1000 milliGRAM(s) IV Intermittent every 6 hours  ceFAZolin   IVPB 2000 milliGRAM(s) IV Intermittent every 8 hours  chlorhexidine 0.12% Liquid 15 milliLiter(s) Oral Mucosa every 12 hours  chlorhexidine 2% Cloths 1 Application(s) Topical daily  dexMEDEtomidine Infusion 0.2 MICROgram(s)/kG/Hr IV Continuous <Continuous>  enoxaparin Injectable 40 milliGRAM(s) SubCutaneous every 24 hours  HYDROmorphone  Injectable 0.5 milliGRAM(s) IV Push every 3 hours PRN  HYDROmorphone PCA (1 mG/mL) 30 milliLiter(s) PCA Continuous PCA Continuous  HYDROmorphone PCA (1 mG/mL) Rescue Clinician Bolus 0.5 milliGRAM(s) IV Push every 15 minutes PRN  insulin lispro (ADMELOG) corrective regimen sliding scale   SubCutaneous every 6 hours  lactated ringers. 1000 milliLiter(s) IV Continuous <Continuous>  naloxone Injectable 0.1 milliGRAM(s) IV Push every 3 minutes PRN  ondansetron Injectable 4 milliGRAM(s) IV Push every 6 hours PRN  pantoprazole  Injectable 40 milliGRAM(s) IV Push daily  phenylephrine    Infusion 0.1 MICROgram(s)/kG/Min IV Continuous <Continuous>  sodium chloride 0.9% lock flush 3 milliLiter(s) IV Push every 8 hours      LABS:                        11.0   24.39 )-----------( 184      ( 04 Jun 2025 21:08 )             32.7     06-04    136  |  106  |  34[H]  ----------------------------<  270[H]  4.9   |  16[L]  |  0.75    Ca    6.6[L]      04 Jun 2025 21:08  Phos  5.8     06-04  Mg     1.70     06-04      PT/INR - ( 04 Jun 2025 21:08 )   PT: 13.2 sec;   INR: 1.11 ratio         PTT - ( 04 Jun 2025 21:08 )  PTT:23.6 sec    RADIOLOGY:       PHYSICAL EXAM:  Intubated, sedated (partially weaned for exam), unable to follow commands  PERRL  MAEx4 spontaneously  Incision c/d/i  PAST 24HR EVENTS:  POD 1 s/p T3 to pelvis fusion and instrumention with L4 PSO, with Dr. Stroud (neurosurgery), Dr. Gray (ortho), and Dr. Bolton (plastics), closed with nylons. 4uPRBCs given intraop, has ramya, dumont, drains per plastics.     Vital Signs Last 24 Hrs  T(C): 35.3 (04 Jun 2025 19:05), Max: 36.6 (04 Jun 2025 05:51)  T(F): 95.5 (04 Jun 2025 19:05), Max: 97.9 (04 Jun 2025 05:51)  HR: 87 (04 Jun 2025 22:42) (63 - 87)  BP: 107/33 (04 Jun 2025 20:00) (96/69 - 146/73)  BP(mean): 44 (04 Jun 2025 20:00) (44 - 79)  RR: 21 (04 Jun 2025 22:00) (10 - 21)  SpO2: 100% (04 Jun 2025 22:42) (92% - 100%)    Parameters below as of 04 Jun 2025 22:00  Patient On (Oxygen Delivery Method): ventilator        MEDS:   acetaminophen   IVPB .. 1000 milliGRAM(s) IV Intermittent every 6 hours  ceFAZolin   IVPB 2000 milliGRAM(s) IV Intermittent every 8 hours  chlorhexidine 0.12% Liquid 15 milliLiter(s) Oral Mucosa every 12 hours  chlorhexidine 2% Cloths 1 Application(s) Topical daily  dexMEDEtomidine Infusion 0.2 MICROgram(s)/kG/Hr IV Continuous <Continuous>  enoxaparin Injectable 40 milliGRAM(s) SubCutaneous every 24 hours  HYDROmorphone  Injectable 0.5 milliGRAM(s) IV Push every 3 hours PRN  HYDROmorphone PCA (1 mG/mL) 30 milliLiter(s) PCA Continuous PCA Continuous  HYDROmorphone PCA (1 mG/mL) Rescue Clinician Bolus 0.5 milliGRAM(s) IV Push every 15 minutes PRN  insulin lispro (ADMELOG) corrective regimen sliding scale   SubCutaneous every 6 hours  lactated ringers. 1000 milliLiter(s) IV Continuous <Continuous>  naloxone Injectable 0.1 milliGRAM(s) IV Push every 3 minutes PRN  ondansetron Injectable 4 milliGRAM(s) IV Push every 6 hours PRN  pantoprazole  Injectable 40 milliGRAM(s) IV Push daily  phenylephrine    Infusion 0.1 MICROgram(s)/kG/Min IV Continuous <Continuous>  sodium chloride 0.9% lock flush 3 milliLiter(s) IV Push every 8 hours      LABS:                        11.0   24.39 )-----------( 184      ( 04 Jun 2025 21:08 )             32.7     06-04    136  |  106  |  34[H]  ----------------------------<  270[H]  4.9   |  16[L]  |  0.75    Ca    6.6[L]      04 Jun 2025 21:08  Phos  5.8     06-04  Mg     1.70     06-04      PT/INR - ( 04 Jun 2025 21:08 )   PT: 13.2 sec;   INR: 1.11 ratio         PTT - ( 04 Jun 2025 21:08 )  PTT:23.6 sec    RADIOLOGY:       PHYSICAL EXAM:  Intubated, sedated (partially weaned for exam), unable to follow commands  PERRL  MAEx4 spontaneously  Incision c/d/i   Drains: LT upper 10cc, LT lower 100cc, RT lower 200cc

## 2025-06-06 ENCOUNTER — RESULT REVIEW (OUTPATIENT)
Age: 75
End: 2025-06-06

## 2025-06-06 LAB
ANION GAP SERPL CALC-SCNC: 10 MMOL/L — SIGNIFICANT CHANGE UP (ref 7–14)
APTT BLD: 26.5 SEC — SIGNIFICANT CHANGE UP (ref 26.1–36.8)
BLOOD GAS ARTERIAL COMPREHENSIVE RESULT: SIGNIFICANT CHANGE UP
BUN SERPL-MCNC: 25 MG/DL — HIGH (ref 7–23)
CA-I BLD-SCNC: 1.05 MMOL/L — LOW (ref 1.15–1.29)
CALCIUM SERPL-MCNC: 7.7 MG/DL — LOW (ref 8.4–10.5)
CHLORIDE SERPL-SCNC: 104 MMOL/L — SIGNIFICANT CHANGE UP (ref 98–107)
CO2 SERPL-SCNC: 24 MMOL/L — SIGNIFICANT CHANGE UP (ref 22–31)
CREAT SERPL-MCNC: 0.66 MG/DL — SIGNIFICANT CHANGE UP (ref 0.5–1.3)
EGFR: 90 ML/MIN/1.73M2 — SIGNIFICANT CHANGE UP
EGFR: 90 ML/MIN/1.73M2 — SIGNIFICANT CHANGE UP
GLUCOSE BLDC GLUCOMTR-MCNC: 55 MG/DL — LOW (ref 70–99)
GLUCOSE BLDC GLUCOMTR-MCNC: 56 MG/DL — LOW (ref 70–99)
GLUCOSE BLDC GLUCOMTR-MCNC: 74 MG/DL — SIGNIFICANT CHANGE UP (ref 70–99)
GLUCOSE BLDC GLUCOMTR-MCNC: 88 MG/DL — SIGNIFICANT CHANGE UP (ref 70–99)
GLUCOSE BLDC GLUCOMTR-MCNC: 93 MG/DL — SIGNIFICANT CHANGE UP (ref 70–99)
GLUCOSE SERPL-MCNC: 128 MG/DL — HIGH (ref 70–99)
HCT VFR BLD CALC: 21.1 % — LOW (ref 34.5–45)
HCT VFR BLD CALC: 21.4 % — LOW (ref 34.5–45)
HCT VFR BLD CALC: 24.3 % — LOW (ref 34.5–45)
HGB BLD-MCNC: 7.5 G/DL — LOW (ref 11.5–15.5)
HGB BLD-MCNC: 7.6 G/DL — LOW (ref 11.5–15.5)
HGB BLD-MCNC: 8.7 G/DL — LOW (ref 11.5–15.5)
INR BLD: 1.16 RATIO — SIGNIFICANT CHANGE UP (ref 0.85–1.16)
MAGNESIUM SERPL-MCNC: 2.1 MG/DL — SIGNIFICANT CHANGE UP (ref 1.6–2.6)
MCHC RBC-ENTMCNC: 29.5 PG — SIGNIFICANT CHANGE UP (ref 27–34)
MCHC RBC-ENTMCNC: 29.8 PG — SIGNIFICANT CHANGE UP (ref 27–34)
MCHC RBC-ENTMCNC: 30.2 PG — SIGNIFICANT CHANGE UP (ref 27–34)
MCHC RBC-ENTMCNC: 35 G/DL — SIGNIFICANT CHANGE UP (ref 32–36)
MCHC RBC-ENTMCNC: 35.8 G/DL — SIGNIFICANT CHANGE UP (ref 32–36)
MCHC RBC-ENTMCNC: 36 G/DL — SIGNIFICANT CHANGE UP (ref 32–36)
MCV RBC AUTO: 83.2 FL — SIGNIFICANT CHANGE UP (ref 80–100)
MCV RBC AUTO: 83.7 FL — SIGNIFICANT CHANGE UP (ref 80–100)
MCV RBC AUTO: 84.3 FL — SIGNIFICANT CHANGE UP (ref 80–100)
NRBC # BLD AUTO: 0 K/UL — SIGNIFICANT CHANGE UP (ref 0–0)
NRBC # BLD AUTO: 0.02 K/UL — HIGH (ref 0–0)
NRBC # BLD AUTO: 0.02 K/UL — HIGH (ref 0–0)
NRBC # FLD: 0 K/UL — SIGNIFICANT CHANGE UP (ref 0–0)
NRBC # FLD: 0.02 K/UL — HIGH (ref 0–0)
NRBC # FLD: 0.02 K/UL — HIGH (ref 0–0)
NRBC BLD AUTO-RTO: 0 /100 WBCS — SIGNIFICANT CHANGE UP (ref 0–0)
PHOSPHATE SERPL-MCNC: 2.8 MG/DL — SIGNIFICANT CHANGE UP (ref 2.5–4.5)
PLATELET # BLD AUTO: 110 K/UL — LOW (ref 150–400)
PLATELET # BLD AUTO: 111 K/UL — LOW (ref 150–400)
PLATELET # BLD AUTO: 125 K/UL — LOW (ref 150–400)
POTASSIUM SERPL-MCNC: 3.3 MMOL/L — LOW (ref 3.5–5.3)
POTASSIUM SERPL-SCNC: 3.3 MMOL/L — LOW (ref 3.5–5.3)
PROTHROM AB SERPL-ACNC: 13.8 SEC — HIGH (ref 9.9–13.4)
RBC # BLD: 2.52 M/UL — LOW (ref 3.8–5.2)
RBC # BLD: 2.54 M/UL — LOW (ref 3.8–5.2)
RBC # BLD: 2.92 M/UL — LOW (ref 3.8–5.2)
RBC # FLD: 14 % — SIGNIFICANT CHANGE UP (ref 10.3–14.5)
RBC # FLD: 14.3 % — SIGNIFICANT CHANGE UP (ref 10.3–14.5)
RBC # FLD: 14.3 % — SIGNIFICANT CHANGE UP (ref 10.3–14.5)
SODIUM SERPL-SCNC: 138 MMOL/L — SIGNIFICANT CHANGE UP (ref 135–145)
WBC # BLD: 11.5 K/UL — HIGH (ref 3.8–10.5)
WBC # BLD: 11.58 K/UL — HIGH (ref 3.8–10.5)
WBC # BLD: 14.08 K/UL — HIGH (ref 3.8–10.5)
WBC # FLD AUTO: 11.5 K/UL — HIGH (ref 3.8–10.5)
WBC # FLD AUTO: 11.58 K/UL — HIGH (ref 3.8–10.5)
WBC # FLD AUTO: 14.08 K/UL — HIGH (ref 3.8–10.5)

## 2025-06-06 PROCEDURE — 99232 SBSQ HOSP IP/OBS MODERATE 35: CPT | Mod: GC

## 2025-06-06 PROCEDURE — 93306 TTE W/DOPPLER COMPLETE: CPT | Mod: 26

## 2025-06-06 RX ORDER — SODIUM CHLORIDE 9 G/1000ML
1000 INJECTION, SOLUTION INTRAVENOUS
Refills: 0 | Status: DISCONTINUED | OUTPATIENT
Start: 2025-06-06 | End: 2025-06-07

## 2025-06-06 RX ORDER — ACETAMINOPHEN 500 MG/5ML
1000 LIQUID (ML) ORAL EVERY 6 HOURS
Refills: 0 | Status: COMPLETED | OUTPATIENT
Start: 2025-06-06 | End: 2025-06-07

## 2025-06-06 RX ORDER — DEXTROSE 50 % IN WATER 50 %
12.5 SYRINGE (ML) INTRAVENOUS ONCE
Refills: 0 | Status: COMPLETED | OUTPATIENT
Start: 2025-06-06 | End: 2025-06-06

## 2025-06-06 RX ORDER — GLUCAGON 3 MG/1
1 POWDER NASAL ONCE
Refills: 0 | Status: DISCONTINUED | OUTPATIENT
Start: 2025-06-06 | End: 2025-06-07

## 2025-06-06 RX ORDER — HYDROMORPHONE/SOD CHLOR,ISO/PF 2 MG/10 ML
0.5 SYRINGE (ML) INJECTION
Refills: 0 | Status: DISCONTINUED | OUTPATIENT
Start: 2025-06-06 | End: 2025-06-07

## 2025-06-06 RX ORDER — HYDROMORPHONE/SOD CHLOR,ISO/PF 2 MG/10 ML
1.5 SYRINGE (ML) INJECTION
Refills: 0 | Status: DISCONTINUED | OUTPATIENT
Start: 2025-06-06 | End: 2025-06-07

## 2025-06-06 RX ORDER — DEXTROSE 50 % IN WATER 50 %
15 SYRINGE (ML) INTRAVENOUS ONCE
Refills: 0 | Status: DISCONTINUED | OUTPATIENT
Start: 2025-06-06 | End: 2025-06-07

## 2025-06-06 RX ORDER — DEXTROSE 50 % IN WATER 50 %
25 SYRINGE (ML) INTRAVENOUS ONCE
Refills: 0 | Status: DISCONTINUED | OUTPATIENT
Start: 2025-06-06 | End: 2025-06-07

## 2025-06-06 RX ORDER — DEXTROSE 50 % IN WATER 50 %
12.5 SYRINGE (ML) INTRAVENOUS ONCE
Refills: 0 | Status: DISCONTINUED | OUTPATIENT
Start: 2025-06-06 | End: 2025-06-07

## 2025-06-06 RX ORDER — HYDROMORPHONE/SOD CHLOR,ISO/PF 2 MG/10 ML
1 SYRINGE (ML) INJECTION
Refills: 0 | Status: DISCONTINUED | OUTPATIENT
Start: 2025-06-06 | End: 2025-06-07

## 2025-06-06 RX ORDER — HYDROMORPHONE/SOD CHLOR,ISO/PF 2 MG/10 ML
0.5 SYRINGE (ML) INJECTION ONCE
Refills: 0 | Status: DISCONTINUED | OUTPATIENT
Start: 2025-06-06 | End: 2025-06-06

## 2025-06-06 RX ORDER — PRAMIPEXOLE DIHYDROCHLORIDE 1 MG/1
3.75 TABLET ORAL DAILY
Refills: 0 | Status: DISCONTINUED | OUTPATIENT
Start: 2025-06-06 | End: 2025-06-07

## 2025-06-06 RX ORDER — BACITRACIN 500 UNIT/G
1 OINTMENT (GRAM) TOPICAL DAILY
Refills: 0 | Status: DISCONTINUED | OUTPATIENT
Start: 2025-06-06 | End: 2025-06-17

## 2025-06-06 RX ADMIN — Medication 40 MILLIGRAM(S): at 12:29

## 2025-06-06 RX ADMIN — Medication 1000 MILLIGRAM(S): at 14:40

## 2025-06-06 RX ADMIN — Medication 1 MILLIGRAM(S): at 00:00

## 2025-06-06 RX ADMIN — POTASSIUM PHOSPHATE, MONOBASIC POTASSIUM PHOSPHATE, DIBASIC INJECTION, 62.5 MILLIMOLE(S): 236; 224 SOLUTION, CONCENTRATE INTRAVENOUS at 00:30

## 2025-06-06 RX ADMIN — Medication 1 MILLIGRAM(S): at 11:06

## 2025-06-06 RX ADMIN — Medication 1000 MILLIGRAM(S): at 23:52

## 2025-06-06 RX ADMIN — Medication 0.5 MILLIGRAM(S): at 21:00

## 2025-06-06 RX ADMIN — Medication 1.5 MILLIGRAM(S): at 23:22

## 2025-06-06 RX ADMIN — Medication 0.5 MILLIGRAM(S): at 07:00

## 2025-06-06 RX ADMIN — Medication 1 MILLIGRAM(S): at 16:30

## 2025-06-06 RX ADMIN — Medication 1000 MILLIGRAM(S): at 00:00

## 2025-06-06 RX ADMIN — Medication 0.5 MILLIGRAM(S): at 13:00

## 2025-06-06 RX ADMIN — Medication 400 MILLIGRAM(S): at 12:29

## 2025-06-06 RX ADMIN — Medication 400 MILLIGRAM(S): at 17:21

## 2025-06-06 RX ADMIN — Medication 0.5 MILLIGRAM(S): at 05:03

## 2025-06-06 RX ADMIN — Medication 1 MILLIGRAM(S): at 01:00

## 2025-06-06 RX ADMIN — Medication 0.5 MILLIGRAM(S): at 06:03

## 2025-06-06 RX ADMIN — Medication 1 MILLIGRAM(S): at 19:33

## 2025-06-06 RX ADMIN — Medication 3 MILLILITER(S): at 12:40

## 2025-06-06 RX ADMIN — Medication 1000 MILLIGRAM(S): at 05:15

## 2025-06-06 RX ADMIN — Medication 1 APPLICATION(S): at 12:30

## 2025-06-06 RX ADMIN — Medication 3 MILLILITER(S): at 22:49

## 2025-06-06 RX ADMIN — Medication 0.5 MILLIGRAM(S): at 20:26

## 2025-06-06 RX ADMIN — Medication 400 MILLIGRAM(S): at 23:30

## 2025-06-06 RX ADMIN — Medication 1000 MILLIGRAM(S): at 17:59

## 2025-06-06 RX ADMIN — Medication 3 MILLILITER(S): at 07:38

## 2025-06-06 RX ADMIN — Medication 1 MILLIGRAM(S): at 03:09

## 2025-06-06 RX ADMIN — Medication 400 MILLIGRAM(S): at 04:45

## 2025-06-06 RX ADMIN — Medication 1 MILLIGRAM(S): at 17:10

## 2025-06-06 RX ADMIN — Medication 0.5 MILLIGRAM(S): at 06:08

## 2025-06-06 RX ADMIN — Medication 0.5 MILLIGRAM(S): at 12:42

## 2025-06-06 RX ADMIN — CALCIUM GLUCONATE 200 GRAM(S): 20 INJECTION, SOLUTION INTRAVENOUS at 05:11

## 2025-06-06 RX ADMIN — Medication 1 MILLIGRAM(S): at 04:00

## 2025-06-06 RX ADMIN — Medication 1 MILLIGRAM(S): at 20:26

## 2025-06-06 NOTE — PROGRESS NOTE ADULT - SUBJECTIVE AND OBJECTIVE BOX
---___---___---___---___---___---___ ---___---___---___---___---___---___---___---___---                  S U R G I C A L   I C U   P R O G R E S S   N O T E  ---___---___---___---___---___---___ ---___---___---___---___---___---___---___---___---    INTERVAL EVENTS:  - dc'd fent  - hgb drop from 10.9 >8.7, 1u pRBC given w appropriate response to 9.7  - extubated  - c/f stroke given altered mental status and deviated gaze > eval by neurosurg > stroke imaging obtained and no acute findings   - MRI ordered for AM  - Hgb downtrended back to 8.4 > 1 U PRBC, 1 FFP, 1 Plts ordered given increasing vasopressor requirements and tachycardia w continued sang output through drain  - B/l lower ext duplex neg for DVTs      [ ] Due to altered mental status/intubation, subjective information were not able to be obtained from the patient. History was obtained, to the extent possible, from review of the chart and collateral sources of information.    OBJECTIVE:    VITALS:  Vital Signs Last 24 Hrs  T(C): 37.7 (06 Jun 2025 00:35), Max: 38.6 (05 Jun 2025 20:00)  T(F): 99.8 (06 Jun 2025 00:35), Max: 101.4 (05 Jun 2025 20:00)  HR: 110 (06 Jun 2025 00:35) (74 - 122)  BP: 98/47 (06 Jun 2025 00:00) (84/55 - 162/98)  BP(mean): 62 (06 Jun 2025 00:00) (62 - 115)  RR: 18 (06 Jun 2025 00:35) (10 - 28)  SpO2: 100% (06 Jun 2025 00:35) (94% - 100%)    Parameters below as of 06 Jun 2025 00:00  Patient On (Oxygen Delivery Method): nasal cannula  O2 Flow (L/min): 2    Mode: standby    I&O's Summary    04 Jun 2025 07:01  -  05 Jun 2025 07:00  --------------------------------------------------------  IN: 1850 mL / OUT: 955 mL / NET: 895 mL    05 Jun 2025 07:01  -  06 Jun 2025 01:38  --------------------------------------------------------  IN: 2330.3 mL / OUT: 1954 mL / NET: 376.3 mL        PHYSICAL EXAM:    NEURO  Exam:  following simple commands, left side stronger than right    RESPIRATORY  Exam:  nonlabored breathing on NC  Mechanical Ventilation: Mode: standby    CARDIOVASCULAR  Exam:  RRR, on levo  Cardiac Rhythm:  sinus    GI/NUTRITION  Exam:  soft, nt, nd  Diet: NPO    GENITOURINARY  Exam: Voiding via dumont    BACK:  Exam: incision with mild strikethrough, RADHA x 2 in deep space with sang output, RADHA x 1 in superficial space with minimal s/s output    ACCESS DEVICES:  [x ] Peripheral IV  [ ] Central Venous Line	[ ] R	[ ] L	[ ] IJ	[ ] Fem	[ ] SC	Placed:   [x ] Arterial Line		[ ] R	[x ] L	[ ] Fem	[ ] Rad	[ x] Ax	Placed: 6/4  [ ] PICC:					[ ] Mediport  [ x] Urinary Catheter, Date Placed: 6/4  [x] Necessity of urinary, arterial, and venous catheters discussed      LABS:                        8.4    17.48 )-----------( 130      ( 05 Jun 2025 21:23 )             23.5   06-05    138  |  105  |  29[H]  ----------------------------<  151[H]  3.6   |  22  |  0.79    Ca    7.4[L]      05 Jun 2025 21:23  Phos  2.7     06-05  Mg     2.10     06-05      CAPILLARY BLOOD GLUCOSE      POCT Blood Glucose.: 104 mg/dL (05 Jun 2025 23:18)  POCT Blood Glucose.: 167 mg/dL (05 Jun 2025 18:09)  POCT Blood Glucose.: 172 mg/dL (05 Jun 2025 11:33)  POCT Blood Glucose.: 172 mg/dL (05 Jun 2025 04:50)      MEDICATIONS:   MEDICATIONS  (STANDING):  acetaminophen   IVPB .. 1000 milliGRAM(s) IV Intermittent every 6 hours  calcium gluconate IVPB 2 Gram(s) IV Intermittent once  chlorhexidine 2% Cloths 1 Application(s) Topical daily  insulin lispro (ADMELOG) corrective regimen sliding scale   SubCutaneous every 6 hours  lactated ringers. 1000 milliLiter(s) (30 mL/Hr) IV Continuous <Continuous>  norepinephrine Infusion 0.08 MICROgram(s)/kG/Min (13.3 mL/Hr) IV Continuous <Continuous>  pantoprazole  Injectable 40 milliGRAM(s) IV Push daily  sodium chloride 0.9% lock flush 3 milliLiter(s) IV Push every 8 hours    MEDICATIONS  (PRN):  HYDROmorphone  Injectable 1 milliGRAM(s) IV Push every 3 hours PRN Severe Pain (7 - 10)  HYDROmorphone  Injectable 0.5 milliGRAM(s) IV Push every 3 hours PRN Moderate Pain (4 - 6)  naloxone Injectable 0.1 milliGRAM(s) IV Push every 3 minutes PRN For ANY of the following changes in patient status:  A. RR LESS THAN 10 breaths per minute, B. Oxygen saturation LESS THAN 90%, C. Sedation score of 6  ondansetron Injectable 4 milliGRAM(s) IV Push every 6 hours PRN Nausea

## 2025-06-06 NOTE — PHYSICAL THERAPY INITIAL EVALUATION ADULT - PERTINENT HX OF CURRENT PROBLEM, REHAB EVAL
75 year old female presents to presurgical testing with diagnosis of unspecified fracture thoracic vertebrae, scoliosis, and postlaminectomy syndrome. Pt twisted her back a few weeks ago resulting in vertebral fracture. Pt is scheduled for T4-Pelvis posterior laminectomy and fusion with posterior instrumentation. Possible corpectomy T9. Lumbar osteotomy and illiac fixation.

## 2025-06-06 NOTE — PROGRESS NOTE ADULT - ASSESSMENT
74 y/o female PMH of HTN, SILVANA, PAF on Eliquis PE (in 1980s) OA, hiatal hernia (repair 2023) gastroparesis, peripheral neuropathy, SILVANA on CPAP, and extensive prior spinal surgery now s/p 6/4 Open lumbar laminectomy, Posterior thoracic laminectomy, T3-pelvis decompression/fusion with L4 pedicle osteotomy and Myocutaneous flap closure with plastic surgery. Patient required increasing amounts of phenylephrine intra-op and was therefore not extubated at case end. SICU consulted for Q1 neuro-checks, ventilator management and hemodynamic monitoring.     NEUROLOGIC   - Pain control: IV tylenol and PRN dilaudid  - Q1 neuro-checks  - s/p CT stroke protocol: no acute findings  - MRI brain noncon pending for AM  - Post-op films when able to stand  - TLSO brace when able    RESPIRATORY   - Monitor SpO2 goal >92%  - Extubated to NC, wean as tolerated    CARDIOVASCULAR   - Monitor hemodynamics, MAP goal > 65  - levo, wean as tolerated  - Trend lactate    GASTROINTESTINAL   - Diet: NPO  - Protonix    /RENAL   - IV fluids: LR @ 100cc/hr  - Maintain dumont catheter, strict Is/Os  - Monitor electrolytes, replete PRN    HEMATOLOGIC  - Trend H/H  - s/p 1 U PRBC 6/5 AM shift; 1 U PRBC 1 U FFP 1 U Plts PM shift  - DVT ppx: SCD's  - HOLDing chemical DVT ppx  - HOLDing home eliquis  - Screening BLE dopplers negative for DVTs    INFECTIOUS DISEASE  - Monitor fever / WBC  - s/p Ancef x 24 hrs    ENDOCRINE  - Monitor gluc  - ISS     LINES  - Left axillary A-line (6/4 - )  - Dumont (6/4 - )  - RADHA x 3 (back, 2 deep, 1 superficial)  - PIV     DISPO: SICU

## 2025-06-06 NOTE — PROGRESS NOTE ADULT - ASSESSMENT
ASSESSMENT/PLAN:   VIRGINIA HUFFMAN is a 75yFemale s/p PSF with closure    - c/w drains  - dressing changes daily  - will follow    Plastic Surgery  [facility]: [id number]  [facility]: [phone]

## 2025-06-06 NOTE — DIETITIAN INITIAL EVALUATION ADULT - PERTINENT LABORATORY DATA
06-06    138  |  104  |  25[H]  ----------------------------<  128[H]  3.3[L]   |  24  |  0.66    Ca    7.7[L]      06 Jun 2025 06:22  Phos  2.8     06-06  Mg     2.10     06-06    POCT Blood Glucose.: 88 mg/dL (06-06-25 @ 12:47)  A1C with Estimated Average Glucose Result: 5.3 % (06-04-25 @ 21:08)  A1C with Estimated Average Glucose Result: 5.6 % (05-22-25 @ 10:15)

## 2025-06-06 NOTE — DIETITIAN INITIAL EVALUATION ADULT - PERTINENT MEDS FT
MEDICATIONS  (STANDING):  acetaminophen   IVPB .. 1000 milliGRAM(s) IV Intermittent every 6 hours  chlorhexidine 2% Cloths 1 Application(s) Topical daily  dextrose 5% + lactated ringers. 1000 milliLiter(s) (75 mL/Hr) IV Continuous <Continuous>  insulin lispro (ADMELOG) corrective regimen sliding scale   SubCutaneous every 6 hours  pantoprazole  Injectable 40 milliGRAM(s) IV Push daily  sodium chloride 0.9% lock flush 3 milliLiter(s) IV Push every 8 hours    MEDICATIONS  (PRN):  HYDROmorphone  Injectable 1 milliGRAM(s) IV Push every 3 hours PRN Moderate Pain (4 - 6)  HYDROmorphone  Injectable 1.5 milliGRAM(s) IV Push every 3 hours PRN Severe Pain (7 - 10)  HYDROmorphone  Injectable 0.5 milliGRAM(s) IV Push every 3 hours PRN Breakthrough  naloxone Injectable 0.1 milliGRAM(s) IV Push every 3 minutes PRN For ANY of the following changes in patient status:  A. RR LESS THAN 10 breaths per minute, B. Oxygen saturation LESS THAN 90%, C. Sedation score of 6  ondansetron Injectable 4 milliGRAM(s) IV Push every 6 hours PRN Nausea

## 2025-06-06 NOTE — OCCUPATIONAL THERAPY INITIAL EVALUATION ADULT - DIAGNOSIS, OT EVAL
s/p vertebral fracture, s/p T3-pelvis fusion; decreased functional mobility, decreased ADL performance

## 2025-06-06 NOTE — DIETITIAN INITIAL EVALUATION ADULT - ETIOLOGY
related to physiological causes due to surgical intervention  related to physiological causes of acute illness

## 2025-06-06 NOTE — PROGRESS NOTE ADULT - SUBJECTIVE AND OBJECTIVE BOX
NSCU Attending Brief eICU note:    Saw patient via telehealth, agree with overall plan, would add the following:    -- Getting second unit pRBC now, CBC hovering mid 7s  -- Would wait until at least 8pm for next follow-up CBC unless patient becomes hemodynamically unstable  -- If appropriate Hgb > 8 on next CBC and hemodynamically stable, consider q12 CBC trend rather than q6    Feel free to call back with any issues.  [phone] or Mehrdad LOONEY eICU thread

## 2025-06-06 NOTE — OCCUPATIONAL THERAPY INITIAL EVALUATION ADULT - PERTINENT HX OF CURRENT PROBLEM, REHAB EVAL
75 year old female with history of HTN, SILVANA, PAF, PE, OA, hiatal hernia (repair 2023) gastroparesis, peripheral neuropathy, SILVANA on CPAP, and extensive prior spinal surgery most recently receiving thoracic lumbar fusion with cage with Dr. Stroud on 9/24, after a fall and complicated by sepsis presents to presurgical testing with diagnosis of unspecified fracture thoracic vertebrae, scoliosis, and postlaminectomy syndrome. Pt twisted her back resulting in vertebral fracture. Patient is now s/p T3-pelvis decompression/fusion with L4 pedicle osteotomy on 6/4/25.

## 2025-06-06 NOTE — OCCUPATIONAL THERAPY INITIAL EVALUATION ADULT - GENERAL OBSERVATIONS, REHAB EVAL
Patient received semisupine in bed in NAD. Cleared to participate in OT evaluation per RN Cherelle. Patient alert, confused, at times moaning, reporting pain in back. +receiving PRBC via IV. +RADHA drain x 3. +Blake. +telemetry monitor. BP: 123/53 mmHg.

## 2025-06-06 NOTE — PHYSICAL THERAPY INITIAL EVALUATION ADULT - TRANSFER SKILLS, REHAB EVAL
Outreach Attempt was made to schedule Overdue Care Gap.    The Outcome of the Outreach was Contact was not made, no answer/busy. Care Gaps discussed included HTN.      Attempted to contact patient but received a busy signal. Letter sent information he is overdue for BP F/U.  
independent/needs device

## 2025-06-06 NOTE — OCCUPATIONAL THERAPY INITIAL EVALUATION ADULT - ADDITIONAL COMMENTS
Patient is a limited/questionable historian. Reports living with her son in an apartment with elevator access. Patient was using a rolling walker for functional mobility and performing ADLs independently.

## 2025-06-06 NOTE — DIETITIAN INITIAL EVALUATION ADULT - OTHER INFO
76 y/o female with PMH of HTN, SILVANA, PAF on Eliquis PE (in 1980s) OA, hiatal hernia (repair 2023) gastroparesis, peripheral neuropathy, SILVANA on CPAP, and extensive prior spinal surgery now s/p 6/4 Open lumbar laminectomy, Posterior thoracic laminectomy, T3-pelvis decompression/fusion with L4 pedicle osteotomy and Myocutaneous flap closure with plastic surgery. Patient required increasing amounts of phenylephrine intra-op and was therefore not extubated at case end. SICU consulted for Q1 neuro-checks, ventilator management and hemodynamic monitoring. Visited with pt to obtain nutrition hx. Pt appeared confused and disoriented; RN confirmed same and said that pt has inconsistently been answering simple questions correctly. Pt being maintained NPO at this time and receiving D5W + LR and IVPB fluids for hydration. Please advance diet when medically feasible as pt has higher nutrition need for surgical healing. Per [facility]IE records, pt has stable wt trend with current wt 195 lbs. No GI distress noted and no BMs per nursing flow sheet records; consider bowel regimen as appropriate. RDN services to remain available as needed.

## 2025-06-06 NOTE — PHYSICAL THERAPY INITIAL EVALUATION ADULT - ADDITIONAL COMMENTS
Patient states she lives with her son in apartment, + elevator access. Patient was independent in ADL prior and was using a walker for ambulation prior to admission.

## 2025-06-06 NOTE — DIETITIAN INITIAL EVALUATION ADULT - ADD RECOMMEND
1. Nutrition plan of care as per surgical team; please advance diet when medically feasible and appropriate. 2. NPO status with continuous IVF hydration as per surgical team. 3. Continue to monitor labs, weights, BM, skin, edema and clinical course. 4. Follow pt as per protocol.

## 2025-06-06 NOTE — PROGRESS NOTE ADULT - SUBJECTIVE AND OBJECTIVE BOX
Plastic Surgery    SUBJECTIVE: Pt seen and examined on rounds with team. No acute events overnight.        OBJECTIVE    PHYSICAL EXAM:   General: NAD, Lying in bed   Neuro: Awake and alert  Back: soft, no collections, incisions intact, jps serosang    VITALS  T(C): 37.2 (06-06-25 @ 05:15), Max: 38.6 (06-05-25 @ 20:00)  HR: 112 (06-06-25 @ 06:00) (77 - 122)  BP: 134/43 (06-06-25 @ 01:00) (84/55 - 145/82)  RR: 25 (06-06-25 @ 06:00) (10 - 28)  SpO2: 100% (06-06-25 @ 06:00) (94% - 100%)  CAPILLARY BLOOD GLUCOSE      POCT Blood Glucose.: 93 mg/dL (06 Jun 2025 05:10)  POCT Blood Glucose.: 104 mg/dL (05 Jun 2025 23:18)  POCT Blood Glucose.: 167 mg/dL (05 Jun 2025 18:09)  POCT Blood Glucose.: 172 mg/dL (05 Jun 2025 11:33)      Is/Os    06-05 @ 07:01  -  06-06 @ 07:00  --------------------------------------------------------  IN:    Dexmedetomidine: 63 mL    IV PiggyBack: 800 mL    Lactated Ringers: 1840 mL    Norepinephrine: 132.3 mL    Plasma: 270 mL    Platelets - Single Donor: 225 mL    PRBCs (Packed Red Blood Cells): 300 mL  Total IN: 3630.3 mL    OUT:    Bulb (mL): 255 mL    Bulb (mL): 370 mL    Bulb (mL): 34 mL    Indwelling Catheter - Urethral (mL): 1880 mL  Total OUT: 2539 mL    Total NET: 1091.3 mL          MEDICATIONS (STANDING): acetaminophen   IVPB .. 1000 milliGRAM(s) IV Intermittent every 6 hours  insulin lispro (ADMELOG) corrective regimen sliding scale   SubCutaneous every 6 hours  lactated ringers. 1000 milliLiter(s) IV Continuous <Continuous>  norepinephrine Infusion 0.08 MICROgram(s)/kG/Min IV Continuous <Continuous>  pantoprazole  Injectable 40 milliGRAM(s) IV Push daily  sodium chloride 0.9% lock flush 3 milliLiter(s) IV Push every 8 hours    MEDICATIONS (PRN):HYDROmorphone  Injectable 1 milliGRAM(s) IV Push every 3 hours PRN Moderate Pain (4 - 6)  HYDROmorphone  Injectable 1.5 milliGRAM(s) IV Push every 3 hours PRN Severe Pain (7 - 10)  HYDROmorphone  Injectable 0.5 milliGRAM(s) IV Push every 3 hours PRN Breakthrough  naloxone Injectable 0.1 milliGRAM(s) IV Push every 3 minutes PRN For ANY of the following changes in patient status:  A. RR LESS THAN 10 breaths per minute, B. Oxygen saturation LESS THAN 90%, C. Sedation score of 6  ondansetron Injectable 4 milliGRAM(s) IV Push every 6 hours PRN Nausea      LABS  CBC (06-05 @ 21:23)                              8.4[L]                         17.48[H]  )----------------(  130[L]     --    % Neutrophils, --    % Lymphocytes, ANC: --                                  23.5[L]  CBC (06-05 @ 12:45)                              9.7[L]                         16.35[H]  )----------------(  128[L]     --    % Neutrophils, --    % Lymphocytes, ANC: --                                  27.3[L]    BMP (06-06 @ 06:22)             --      |  --      |  --    		Ca++ 1.05[L]  Ca --                 ---------------------------------( --    		Mg --                 --      |  --      |  --    			Ph --      BMP (06-05 @ 21:23)             138     |  105     |  29[H] 		Ca++ --      Ca 7.4[L]             ---------------------------------( 151[H]		Mg 2.10               3.6     |  22      |  0.79  			Ph 2.7         Coags (06-05 @ 00:42)  aPTT 23.2[L] / INR 1.16 / PT 13.8[H]  Coags (06-04 @ 21:08)  aPTT 23.6[L] / INR 1.11 / PT 13.2      ABG (06-06 @ 06:22)     7.41 / 40 / 90 / 25 / 0.7 / 98.5[H]%     Lactate:    ABG (06-05 @ 21:23)     7.42 / 35 / 65[L] / 23 / -1.5 / 94.9%     Lactate:          IMAGING STUDIES

## 2025-06-07 DIAGNOSIS — Z98.1 ARTHRODESIS STATUS: ICD-10-CM

## 2025-06-07 LAB
ANION GAP SERPL CALC-SCNC: 11 MMOL/L — SIGNIFICANT CHANGE UP (ref 7–14)
ANION GAP SERPL CALC-SCNC: 9 MMOL/L — SIGNIFICANT CHANGE UP (ref 7–14)
BUN SERPL-MCNC: 13 MG/DL — SIGNIFICANT CHANGE UP (ref 7–23)
BUN SERPL-MCNC: 9 MG/DL — SIGNIFICANT CHANGE UP (ref 7–23)
CALCIUM SERPL-MCNC: 7.5 MG/DL — LOW (ref 8.4–10.5)
CALCIUM SERPL-MCNC: 8.2 MG/DL — LOW (ref 8.4–10.5)
CHLORIDE SERPL-SCNC: 104 MMOL/L — SIGNIFICANT CHANGE UP (ref 98–107)
CHLORIDE SERPL-SCNC: 104 MMOL/L — SIGNIFICANT CHANGE UP (ref 98–107)
CO2 SERPL-SCNC: 25 MMOL/L — SIGNIFICANT CHANGE UP (ref 22–31)
CO2 SERPL-SCNC: 26 MMOL/L — SIGNIFICANT CHANGE UP (ref 22–31)
CREAT SERPL-MCNC: 0.49 MG/DL — LOW (ref 0.5–1.3)
CREAT SERPL-MCNC: 0.5 MG/DL — SIGNIFICANT CHANGE UP (ref 0.5–1.3)
EGFR: 97 ML/MIN/1.73M2 — SIGNIFICANT CHANGE UP
GLUCOSE BLDC GLUCOMTR-MCNC: 117 MG/DL — HIGH (ref 70–99)
GLUCOSE BLDC GLUCOMTR-MCNC: 140 MG/DL — HIGH (ref 70–99)
GLUCOSE BLDC GLUCOMTR-MCNC: 158 MG/DL — HIGH (ref 70–99)
GLUCOSE BLDC GLUCOMTR-MCNC: 165 MG/DL — HIGH (ref 70–99)
GLUCOSE SERPL-MCNC: 126 MG/DL — HIGH (ref 70–99)
GLUCOSE SERPL-MCNC: 153 MG/DL — HIGH (ref 70–99)
HCT VFR BLD CALC: 23.4 % — LOW (ref 34.5–45)
HCT VFR BLD CALC: 24.7 % — LOW (ref 34.5–45)
HCT VFR BLD CALC: 25.9 % — LOW (ref 34.5–45)
HGB BLD-MCNC: 8.2 G/DL — LOW (ref 11.5–15.5)
HGB BLD-MCNC: 8.6 G/DL — LOW (ref 11.5–15.5)
HGB BLD-MCNC: 9.1 G/DL — LOW (ref 11.5–15.5)
MAGNESIUM SERPL-MCNC: 1.8 MG/DL — SIGNIFICANT CHANGE UP (ref 1.6–2.6)
MAGNESIUM SERPL-MCNC: 2.1 MG/DL — SIGNIFICANT CHANGE UP (ref 1.6–2.6)
MCHC RBC-ENTMCNC: 29.4 PG — SIGNIFICANT CHANGE UP (ref 27–34)
MCHC RBC-ENTMCNC: 29.8 PG — SIGNIFICANT CHANGE UP (ref 27–34)
MCHC RBC-ENTMCNC: 30 PG — SIGNIFICANT CHANGE UP (ref 27–34)
MCHC RBC-ENTMCNC: 34.8 G/DL — SIGNIFICANT CHANGE UP (ref 32–36)
MCHC RBC-ENTMCNC: 35 G/DL — SIGNIFICANT CHANGE UP (ref 32–36)
MCHC RBC-ENTMCNC: 35.1 G/DL — SIGNIFICANT CHANGE UP (ref 32–36)
MCV RBC AUTO: 83.8 FL — SIGNIFICANT CHANGE UP (ref 80–100)
MCV RBC AUTO: 85.1 FL — SIGNIFICANT CHANGE UP (ref 80–100)
MCV RBC AUTO: 86.1 FL — SIGNIFICANT CHANGE UP (ref 80–100)
NRBC # BLD AUTO: 0.03 K/UL — HIGH (ref 0–0)
NRBC # FLD: 0.03 K/UL — HIGH (ref 0–0)
NRBC BLD AUTO-RTO: 0 /100 WBCS — SIGNIFICANT CHANGE UP (ref 0–0)
PHOSPHATE SERPL-MCNC: 1.3 MG/DL — LOW (ref 2.5–4.5)
PHOSPHATE SERPL-MCNC: 2.5 MG/DL — SIGNIFICANT CHANGE UP (ref 2.5–4.5)
PLATELET # BLD AUTO: 111 K/UL — LOW (ref 150–400)
PLATELET # BLD AUTO: 122 K/UL — LOW (ref 150–400)
PLATELET # BLD AUTO: 129 K/UL — LOW (ref 150–400)
POTASSIUM SERPL-MCNC: 2.9 MMOL/L — CRITICAL LOW (ref 3.5–5.3)
POTASSIUM SERPL-MCNC: 3.2 MMOL/L — LOW (ref 3.5–5.3)
POTASSIUM SERPL-SCNC: 2.9 MMOL/L — CRITICAL LOW (ref 3.5–5.3)
POTASSIUM SERPL-SCNC: 3.2 MMOL/L — LOW (ref 3.5–5.3)
RBC # BLD: 2.75 M/UL — LOW (ref 3.8–5.2)
RBC # BLD: 2.87 M/UL — LOW (ref 3.8–5.2)
RBC # BLD: 3.09 M/UL — LOW (ref 3.8–5.2)
RBC # FLD: 14.4 % — SIGNIFICANT CHANGE UP (ref 10.3–14.5)
RBC # FLD: 14.5 % — SIGNIFICANT CHANGE UP (ref 10.3–14.5)
RBC # FLD: 14.7 % — HIGH (ref 10.3–14.5)
SODIUM SERPL-SCNC: 138 MMOL/L — SIGNIFICANT CHANGE UP (ref 135–145)
SODIUM SERPL-SCNC: 141 MMOL/L — SIGNIFICANT CHANGE UP (ref 135–145)
WBC # BLD: 12.21 K/UL — HIGH (ref 3.8–10.5)
WBC # BLD: 12.63 K/UL — HIGH (ref 3.8–10.5)
WBC # BLD: 13.11 K/UL — HIGH (ref 3.8–10.5)
WBC # FLD AUTO: 12.21 K/UL — HIGH (ref 3.8–10.5)
WBC # FLD AUTO: 12.63 K/UL — HIGH (ref 3.8–10.5)
WBC # FLD AUTO: 13.11 K/UL — HIGH (ref 3.8–10.5)

## 2025-06-07 PROCEDURE — G0425: CPT | Mod: 93

## 2025-06-07 PROCEDURE — 99232 SBSQ HOSP IP/OBS MODERATE 35: CPT | Mod: GC

## 2025-06-07 RX ORDER — POLYETHYLENE GLYCOL 3350 17 G/17G
17 POWDER, FOR SOLUTION ORAL DAILY
Refills: 0 | Status: DISCONTINUED | OUTPATIENT
Start: 2025-06-07 | End: 2025-06-17

## 2025-06-07 RX ORDER — MAGNESIUM SULFATE 500 MG/ML
2 SYRINGE (ML) INJECTION ONCE
Refills: 0 | Status: COMPLETED | OUTPATIENT
Start: 2025-06-07 | End: 2025-06-07

## 2025-06-07 RX ORDER — POTASSIUM CHLORIDE, DEXTROSE MONOHYDRATE AND SODIUM CHLORIDE 150; 5; 900 MG/100ML; G/100ML; MG/100ML
1000 INJECTION, SOLUTION INTRAVENOUS
Refills: 0 | Status: DISCONTINUED | OUTPATIENT
Start: 2025-06-07 | End: 2025-06-07

## 2025-06-07 RX ORDER — SODIUM PHOSPHATE,DIBASIC DIHYD
30 POWDER (GRAM) MISCELLANEOUS ONCE
Refills: 0 | Status: COMPLETED | OUTPATIENT
Start: 2025-06-07 | End: 2025-06-07

## 2025-06-07 RX ORDER — METOPROLOL SUCCINATE 50 MG/1
50 TABLET, EXTENDED RELEASE ORAL ONCE
Refills: 0 | Status: COMPLETED | OUTPATIENT
Start: 2025-06-07 | End: 2025-06-07

## 2025-06-07 RX ORDER — PREGABALIN 50 MG/1
100 CAPSULE ORAL
Refills: 0 | Status: DISCONTINUED | OUTPATIENT
Start: 2025-06-07 | End: 2025-06-14

## 2025-06-07 RX ORDER — HYDROMORPHONE/SOD CHLOR,ISO/PF 2 MG/10 ML
2 SYRINGE (ML) INJECTION EVERY 4 HOURS
Refills: 0 | Status: DISCONTINUED | OUTPATIENT
Start: 2025-06-07 | End: 2025-06-11

## 2025-06-07 RX ORDER — METOPROLOL SUCCINATE 50 MG/1
50 TABLET, EXTENDED RELEASE ORAL
Refills: 0 | Status: DISCONTINUED | OUTPATIENT
Start: 2025-06-07 | End: 2025-06-11

## 2025-06-07 RX ORDER — POTASSIUM PHOSPHATE, MONOBASIC POTASSIUM PHOSPHATE, DIBASIC INJECTION, 236; 224 MG/ML; MG/ML
15 SOLUTION, CONCENTRATE INTRAVENOUS ONCE
Refills: 0 | Status: COMPLETED | OUTPATIENT
Start: 2025-06-07 | End: 2025-06-07

## 2025-06-07 RX ORDER — SENNA 187 MG
2 TABLET ORAL AT BEDTIME
Refills: 0 | Status: DISCONTINUED | OUTPATIENT
Start: 2025-06-07 | End: 2025-06-17

## 2025-06-07 RX ORDER — ENOXAPARIN SODIUM 100 MG/ML
40 INJECTION SUBCUTANEOUS EVERY 24 HOURS
Refills: 0 | Status: DISCONTINUED | OUTPATIENT
Start: 2025-06-07 | End: 2025-06-11

## 2025-06-07 RX ORDER — HYDROMORPHONE/SOD CHLOR,ISO/PF 2 MG/10 ML
4 SYRINGE (ML) INJECTION EVERY 4 HOURS
Refills: 0 | Status: DISCONTINUED | OUTPATIENT
Start: 2025-06-07 | End: 2025-06-11

## 2025-06-07 RX ORDER — SOD PHOS DI, MONO/K PHOS MONO 250 MG
2 TABLET ORAL ONCE
Refills: 0 | Status: COMPLETED | OUTPATIENT
Start: 2025-06-07 | End: 2025-06-07

## 2025-06-07 RX ORDER — CALCIUM GLUCONATE 20 MG/ML
2 INJECTION, SOLUTION INTRAVENOUS ONCE
Refills: 0 | Status: COMPLETED | OUTPATIENT
Start: 2025-06-07 | End: 2025-06-07

## 2025-06-07 RX ORDER — PRAMIPEXOLE DIHYDROCHLORIDE 1 MG/1
1.25 TABLET ORAL THREE TIMES A DAY
Refills: 0 | Status: DISCONTINUED | OUTPATIENT
Start: 2025-06-07 | End: 2025-06-14

## 2025-06-07 RX ORDER — CYCLOBENZAPRINE HYDROCHLORIDE 15 MG/1
10 CAPSULE, EXTENDED RELEASE ORAL
Refills: 0 | Status: DISCONTINUED | OUTPATIENT
Start: 2025-06-07 | End: 2025-06-17

## 2025-06-07 RX ORDER — HYDROMORPHONE/SOD CHLOR,ISO/PF 2 MG/10 ML
0.5 SYRINGE (ML) INJECTION EVERY 4 HOURS
Refills: 0 | Status: DISCONTINUED | OUTPATIENT
Start: 2025-06-07 | End: 2025-06-11

## 2025-06-07 RX ADMIN — Medication 25 GRAM(S): at 03:06

## 2025-06-07 RX ADMIN — POTASSIUM CHLORIDE, DEXTROSE MONOHYDRATE AND SODIUM CHLORIDE 75 MILLILITER(S): 150; 5; 900 INJECTION, SOLUTION INTRAVENOUS at 03:06

## 2025-06-07 RX ADMIN — METOPROLOL SUCCINATE 50 MILLIGRAM(S): 50 TABLET, EXTENDED RELEASE ORAL at 11:56

## 2025-06-07 RX ADMIN — Medication 4 MILLIGRAM(S): at 11:56

## 2025-06-07 RX ADMIN — Medication 10 MILLIGRAM(S): at 22:15

## 2025-06-07 RX ADMIN — Medication 4 MILLIGRAM(S): at 22:50

## 2025-06-07 RX ADMIN — POTASSIUM CHLORIDE, DEXTROSE MONOHYDRATE AND SODIUM CHLORIDE 75 MILLILITER(S): 150; 5; 900 INJECTION, SOLUTION INTRAVENOUS at 07:32

## 2025-06-07 RX ADMIN — Medication 100 MILLIEQUIVALENT(S): at 21:40

## 2025-06-07 RX ADMIN — Medication 2 PACKET(S): at 06:38

## 2025-06-07 RX ADMIN — Medication 4 MILLIGRAM(S): at 07:54

## 2025-06-07 RX ADMIN — PRAMIPEXOLE DIHYDROCHLORIDE 1.25 MILLIGRAM(S): 1 TABLET ORAL at 00:53

## 2025-06-07 RX ADMIN — Medication 12.5 GRAM(S): at 00:04

## 2025-06-07 RX ADMIN — PRAMIPEXOLE DIHYDROCHLORIDE 1.25 MILLIGRAM(S): 1 TABLET ORAL at 11:58

## 2025-06-07 RX ADMIN — Medication 4 MILLIGRAM(S): at 01:52

## 2025-06-07 RX ADMIN — Medication 4 MILLIGRAM(S): at 12:30

## 2025-06-07 RX ADMIN — Medication 3 MILLILITER(S): at 06:18

## 2025-06-07 RX ADMIN — SODIUM CHLORIDE 75 MILLILITER(S): 9 INJECTION, SOLUTION INTRAVENOUS at 00:52

## 2025-06-07 RX ADMIN — Medication 400 MILLIGRAM(S): at 06:34

## 2025-06-07 RX ADMIN — Medication 85 MILLIMOLE(S): at 03:07

## 2025-06-07 RX ADMIN — Medication 4 MILLIGRAM(S): at 00:52

## 2025-06-07 RX ADMIN — PREGABALIN 100 MILLIGRAM(S): 50 CAPSULE ORAL at 17:01

## 2025-06-07 RX ADMIN — Medication 100 MILLIEQUIVALENT(S): at 06:00

## 2025-06-07 RX ADMIN — Medication 40 MILLIEQUIVALENT(S): at 06:39

## 2025-06-07 RX ADMIN — Medication 100 MILLIEQUIVALENT(S): at 04:04

## 2025-06-07 RX ADMIN — CYCLOBENZAPRINE HYDROCHLORIDE 10 MILLIGRAM(S): 15 CAPSULE, EXTENDED RELEASE ORAL at 06:37

## 2025-06-07 RX ADMIN — Medication 40 MILLIGRAM(S): at 11:57

## 2025-06-07 RX ADMIN — Medication 1 APPLICATION(S): at 11:59

## 2025-06-07 RX ADMIN — Medication 4 MILLIGRAM(S): at 08:30

## 2025-06-07 RX ADMIN — Medication 4 MILLIGRAM(S): at 22:20

## 2025-06-07 RX ADMIN — Medication 3 MILLILITER(S): at 22:07

## 2025-06-07 RX ADMIN — Medication 100 MILLIEQUIVALENT(S): at 03:05

## 2025-06-07 RX ADMIN — ENOXAPARIN SODIUM 40 MILLIGRAM(S): 100 INJECTION SUBCUTANEOUS at 20:22

## 2025-06-07 RX ADMIN — Medication 2 TABLET(S): at 22:17

## 2025-06-07 RX ADMIN — Medication 4 MILLIGRAM(S): at 17:00

## 2025-06-07 RX ADMIN — Medication 100 MILLIEQUIVALENT(S): at 20:22

## 2025-06-07 RX ADMIN — PRAMIPEXOLE DIHYDROCHLORIDE 1.25 MILLIGRAM(S): 1 TABLET ORAL at 22:27

## 2025-06-07 RX ADMIN — Medication 400 MILLIGRAM(S): at 11:59

## 2025-06-07 RX ADMIN — Medication 1.5 MILLIGRAM(S): at 00:00

## 2025-06-07 RX ADMIN — Medication 1000 MILLIGRAM(S): at 08:00

## 2025-06-07 RX ADMIN — CYCLOBENZAPRINE HYDROCHLORIDE 10 MILLIGRAM(S): 15 CAPSULE, EXTENDED RELEASE ORAL at 17:01

## 2025-06-07 RX ADMIN — Medication 3 MILLILITER(S): at 14:00

## 2025-06-07 RX ADMIN — Medication 1 APPLICATION(S): at 11:56

## 2025-06-07 RX ADMIN — PREGABALIN 100 MILLIGRAM(S): 50 CAPSULE ORAL at 06:37

## 2025-06-07 RX ADMIN — Medication 40 MILLIEQUIVALENT(S): at 17:01

## 2025-06-07 RX ADMIN — Medication 1000 MILLIGRAM(S): at 12:30

## 2025-06-07 RX ADMIN — POTASSIUM PHOSPHATE, MONOBASIC POTASSIUM PHOSPHATE, DIBASIC INJECTION, 62.5 MILLIMOLE(S): 236; 224 SOLUTION, CONCENTRATE INTRAVENOUS at 23:06

## 2025-06-07 RX ADMIN — POLYETHYLENE GLYCOL 3350 17 GRAM(S): 17 POWDER, FOR SOLUTION ORAL at 11:59

## 2025-06-07 RX ADMIN — Medication 4 MILLIGRAM(S): at 17:20

## 2025-06-07 RX ADMIN — Medication 40 MILLIEQUIVALENT(S): at 22:36

## 2025-06-07 RX ADMIN — INSULIN LISPRO 1: 100 INJECTION, SOLUTION INTRAVENOUS; SUBCUTANEOUS at 06:47

## 2025-06-07 RX ADMIN — CALCIUM GLUCONATE 200 GRAM(S): 20 INJECTION, SOLUTION INTRAVENOUS at 07:55

## 2025-06-07 RX ADMIN — METOPROLOL SUCCINATE 50 MILLIGRAM(S): 50 TABLET, EXTENDED RELEASE ORAL at 17:01

## 2025-06-07 NOTE — PROGRESS NOTE ADULT - SUBJECTIVE AND OBJECTIVE BOX
PAST 24HR EVENTS: H/H 8.2/23.4, trending cbc every 12h as long as patient is hemodynamically stable, hypokalemic this morning to 2.9 given potassium chloride tablets for repletion. On exam, Ms. French is more alert to person and place.     Vital Signs Last 24 Hrs  T(C): 36.4 (07 Jun 2025 04:00), Max: 37.2 (06 Jun 2025 05:15)  T(F): 97.6 (07 Jun 2025 04:00), Max: 99 (06 Jun 2025 05:15)  HR: 94 (07 Jun 2025 04:00) (92 - 112)  BP: 100/51 (06 Jun 2025 13:00) (100/51 - 115/47)  BP(mean): 64 (06 Jun 2025 13:00) (64 - 66)  RR: 22 (07 Jun 2025 04:00) (11 - 25)  SpO2: 100% (07 Jun 2025 04:00) (94% - 100%)    Parameters below as of 07 Jun 2025 04:00  Patient On (Oxygen Delivery Method): nasal cannula  O2 Flow (L/min): 2      MEDS:   acetaminophen   IVPB .. 1000 milliGRAM(s) IV Intermittent every 6 hours  bacitracin   Ointment 1 Application(s) Topical daily  calcium gluconate IVPB 2 Gram(s) IV Intermittent once  chlorhexidine 2% Cloths 1 Application(s) Topical daily  cyclobenzaprine 10 milliGRAM(s) Oral two times a day  dextrose 5% + sodium chloride 0.45% with potassium chloride 20 mEq/L 1000 milliLiter(s) IV Continuous <Continuous>  HYDROmorphone   Tablet 2 milliGRAM(s) Oral every 4 hours PRN  HYDROmorphone   Tablet 4 milliGRAM(s) Oral every 4 hours PRN  HYDROmorphone  Injectable 0.5 milliGRAM(s) IV Push every 4 hours PRN  insulin lispro (ADMELOG) corrective regimen sliding scale   SubCutaneous every 6 hours  naloxone Injectable 0.1 milliGRAM(s) IV Push every 3 minutes PRN  ondansetron Injectable 4 milliGRAM(s) IV Push every 6 hours PRN  pantoprazole  Injectable 40 milliGRAM(s) IV Push daily  potassium chloride    Tablet ER 40 milliEquivalent(s) Oral once  potassium chloride  10 mEq/100 mL IVPB 10 milliEquivalent(s) IV Intermittent every 1 hour  potassium phosphate / sodium phosphate Powder (PHOS-NaK) 2 Packet(s) Oral once  pramipexole 1.25 milliGRAM(s) Oral three times a day  pregabalin 100 milliGRAM(s) Oral two times a day  sodium chloride 0.9% lock flush 3 milliLiter(s) IV Push every 8 hours      LABS:                        8.2    12.21 )-----------( 111      ( 07 Jun 2025 00:44 )             23.4     06-07    138  |  104  |  13  ----------------------------<  153[H]  2.9[LL]   |  25  |  0.50    Ca    7.5[L]      07 Jun 2025 00:44  Phos  1.3     06-07  Mg     1.80     06-07      PT/INR - ( 06 Jun 2025 06:22 )   PT: 13.8 sec;   INR: 1.16 ratio         PTT - ( 06 Jun 2025 06:22 )  PTT:26.5 sec      PHYSICAL EXAM:  AOx2 (person, place), age appropriate, follows commands  PERRL, EOMI, face symmetrical   Sensation intact to light touch   b/l UE 5/5   b/l LE 4/5 (pain limited on exam)  No pronator drift   Incision C/D/I   No clonus  No hoffmans

## 2025-06-07 NOTE — PROGRESS NOTE ADULT - SUBJECTIVE AND OBJECTIVE BOX
---___---___---___---___---___---___ ---___---___---___---___---___---___---___---___---                  S U R G I C A L   I C U   P R O G R E S S   N O T E  ---___---___---___---___---___---___ ---___---___---___---___---___---___---___---___---    INTERVAL EVENTS:  - neurochecks liberated to q2  - Hgb continuing to downtrend: 9.7 (6/5)  > 8.4 (6/5) > 7.6 (6/6) - nsgy doesn't want CT spine > 2 U PRBC given, did not respond after 1st unit  - 1 U cryo given  - passed dysphagia screening > restarted home PO pain meds    [ ] Due to altered mental status/intubation, subjective information were not able to be obtained from the patient. History was obtained, to the extent possible, from review of the chart and collateral sources of information.    OBJECTIVE:    VITALS:  Vital Signs Last 24 Hrs  T(C): 37.1 (07 Jun 2025 00:00), Max: 37.4 (06 Jun 2025 01:50)  T(F): 98.7 (07 Jun 2025 00:00), Max: 99.4 (06 Jun 2025 01:50)  HR: 105 (07 Jun 2025 00:00) (95 - 113)  BP: 100/51 (06 Jun 2025 13:00) (100/51 - 115/47)  BP(mean): 64 (06 Jun 2025 13:00) (64 - 66)  RR: 13 (07 Jun 2025 00:00) (12 - 25)  SpO2: 100% (07 Jun 2025 00:00) (94% - 100%)    Parameters below as of 07 Jun 2025 00:00  Patient On (Oxygen Delivery Method): nasal cannula  O2 Flow (L/min): 2      I&O's Summary    05 Jun 2025 07:01  -  06 Jun 2025 07:00  --------------------------------------------------------  IN: 3630.3 mL / OUT: 2539 mL / NET: 1091.3 mL    06 Jun 2025 07:01  -  07 Jun 2025 01:11  --------------------------------------------------------  IN: 2025 mL / OUT: 2111 mL / NET: -86 mL        PHYSICAL EXAM:    NEURO  Exam:  improving mental status, A&Ox2, follows commands    RESPIRATORY  Exam:  nonlabored breathing on room air  Mechanical Ventilation:     CARDIOVASCULAR  Exam:  RRR, on levo  Cardiac Rhythm:  sinus    GI/NUTRITION  Exam:  soft, nt, nd  Diet: NPO    GENITOURINARY  Exam: Voiding via dumont    BACK:  Exam: incision with mild strikethrough, no hematoma, RADHA x 2 in deep space with s/s output, RADHA x 1 in superficial space with minimal s/s output    ACCESS DEVICES:  [x ] Peripheral IV  [ ] Central Venous Line	[ ] R	[ ] L	[ ] IJ	[ ] Fem	[ ] SC	Placed:   [x ] Arterial Line		[ ] R	[x ] L	[ ] Fem	[ ] Rad	[ x] Ax	Placed: 6/4  [ ] PICC:					[ ] Mediport  [ x] Urinary Catheter, Date Placed: 6/4  [x] Necessity of urinary, arterial, and venous catheters discussed      LABS:                        8.2    12.21 )-----------( 111      ( 07 Jun 2025 00:44 )             23.4   06-06    138  |  104  |  25[H]  ----------------------------<  128[H]  3.3[L]   |  24  |  0.66    Ca    7.7[L]      06 Jun 2025 06:22  Phos  2.8     06-06  Mg     2.10     06-06      CAPILLARY BLOOD GLUCOSE      POCT Blood Glucose.: 165 mg/dL (07 Jun 2025 00:43)  POCT Blood Glucose.: 56 mg/dL (06 Jun 2025 23:39)  POCT Blood Glucose.: 55 mg/dL (06 Jun 2025 23:36)  POCT Blood Glucose.: 74 mg/dL (06 Jun 2025 17:20)  POCT Blood Glucose.: 88 mg/dL (06 Jun 2025 12:47)  POCT Blood Glucose.: 93 mg/dL (06 Jun 2025 05:10)      MEDICATIONS:   MEDICATIONS  (STANDING):  acetaminophen   IVPB .. 1000 milliGRAM(s) IV Intermittent every 6 hours  bacitracin   Ointment 1 Application(s) Topical daily  chlorhexidine 2% Cloths 1 Application(s) Topical daily  cyclobenzaprine 10 milliGRAM(s) Oral two times a day  dextrose 5% + lactated ringers. 1000 milliLiter(s) (75 mL/Hr) IV Continuous <Continuous>  insulin lispro (ADMELOG) corrective regimen sliding scale   SubCutaneous every 6 hours  pantoprazole  Injectable 40 milliGRAM(s) IV Push daily  pramipexole 1.25 milliGRAM(s) Oral three times a day  pregabalin 100 milliGRAM(s) Oral two times a day  sodium chloride 0.9% lock flush 3 milliLiter(s) IV Push every 8 hours    MEDICATIONS  (PRN):  HYDROmorphone   Tablet 2 milliGRAM(s) Oral every 4 hours PRN Moderate Pain (4 - 6)  HYDROmorphone   Tablet 4 milliGRAM(s) Oral every 4 hours PRN Severe Pain (7 - 10)  HYDROmorphone  Injectable 0.5 milliGRAM(s) IV Push every 4 hours PRN breakthrough  naloxone Injectable 0.1 milliGRAM(s) IV Push every 3 minutes PRN For ANY of the following changes in patient status:  A. RR LESS THAN 10 breaths per minute, B. Oxygen saturation LESS THAN 90%, C. Sedation score of 6  ondansetron Injectable 4 milliGRAM(s) IV Push every 6 hours PRN Nausea

## 2025-06-07 NOTE — PROGRESS NOTE ADULT - ASSESSMENT
74 y/o female PMH of HTN, SILVANA, PAF on Eliquis PE (in 1980s) OA, hiatal hernia (repair 2023) gastroparesis, peripheral neuropathy, SILVANA on CPAP, and extensive prior spinal surgery (last in 09/2024-T9 partial corpectomy, had paraspinal abscess drained in 10/2024) presented as SDA for T3 to pelvis fusion and instrumention with L4 PSO on 6/4/25.     6/4 OR for T3 to pelvis fusion and instrumention with L4 PSO, with Dr. Stroud (neurosurgery), Dr. Gray (ortho), and Dr. Bolton (plastics), closed with nylons. 4uPRBCs given intraop, has tim bui, drains per plastics. Low pressures at end of case, pt required increasing phenylephrine intraop. 500 cc LR bolus given for increasing vasopressor requirements and elevated lactate.     6/5 POD 1, intubated and sedated (partially weaned for exam), MAEx4 spont under wrist restraints, incision c/d/i. On ancef 24h postop. Postop Hg 11.0, ramya /70.  6/7 POD 3,  H/H 8.2/23.4, trending cbc every 12h as long as patient is hemodynamically stable, hypokalemic this morning to 2.9 given potassium chloride tablets for repletion. On exam, Ms. French is more alert to person and place.

## 2025-06-07 NOTE — PROGRESS NOTE ADULT - PROBLEM SELECTOR PLAN 1
- cont to trend cbc, last H/H was 8.2/23, if cbc stable then ok to advance diet today  - cont with IVF for maintenance   - holding chemical DVT ppx  - TLSO brace when OOB  - cont with q2 NC  - MRI brain noncon pending  - Post-op films when able to stand  - pain control     case d/w attending  [id number]

## 2025-06-07 NOTE — PROGRESS NOTE ADULT - ASSESSMENT
75yFemale s/p T4-pelvis post lami, PSF    -pain control  -incentive spirometry  -DVT ppx: SCDs, lovenox   -PT/OT  -care per SICU and neurosurgery      Caroline Garcia, PGY-2  Orthopedic Surgery    [facility] [place] [id number]  [facility]: [id number]  [facility]:  [id number]/ [phone]

## 2025-06-07 NOTE — PROGRESS NOTE ADULT - SUBJECTIVE AND OBJECTIVE BOX
Orthopedic Surgery Progress Note     S: Patient seen and examined today. Received 2U prbcs yesterday. No acute events overnight. Pain is well controlled. Denies f/c, chest pain, shortness of breath, dizziness.    MEDICATIONS  (STANDING):  acetaminophen   IVPB .. 1000 milliGRAM(s) IV Intermittent every 6 hours  bacitracin   Ointment 1 Application(s) Topical daily  chlorhexidine 2% Cloths 1 Application(s) Topical daily  cyclobenzaprine 10 milliGRAM(s) Oral two times a day  dextrose 5% + sodium chloride 0.45% with potassium chloride 20 mEq/L 1000 milliLiter(s) (75 mL/Hr) IV Continuous <Continuous>  insulin lispro (ADMELOG) corrective regimen sliding scale   SubCutaneous every 6 hours  pantoprazole  Injectable 40 milliGRAM(s) IV Push daily  pramipexole 1.25 milliGRAM(s) Oral three times a day  pregabalin 100 milliGRAM(s) Oral two times a day  sodium chloride 0.9% lock flush 3 milliLiter(s) IV Push every 8 hours    MEDICATIONS  (PRN):  HYDROmorphone   Tablet 2 milliGRAM(s) Oral every 4 hours PRN Moderate Pain (4 - 6)  HYDROmorphone   Tablet 4 milliGRAM(s) Oral every 4 hours PRN Severe Pain (7 - 10)  HYDROmorphone  Injectable 0.5 milliGRAM(s) IV Push every 4 hours PRN breakthrough  naloxone Injectable 0.1 milliGRAM(s) IV Push every 3 minutes PRN For ANY of the following changes in patient status:  A. RR LESS THAN 10 breaths per minute, B. Oxygen saturation LESS THAN 90%, C. Sedation score of 6  ondansetron Injectable 4 milliGRAM(s) IV Push every 6 hours PRN Nausea      Vital Signs Last 24 Hrs  T(C): 36.4 (07 Jun 2025 04:00), Max: 37.1 (06 Jun 2025 12:00)  T(F): 97.6 (07 Jun 2025 04:00), Max: 98.7 (06 Jun 2025 12:00)  HR: 105 (07 Jun 2025 07:00) (92 - 108)  BP: 100/51 (06 Jun 2025 13:00) (100/51 - 100/51)  BP(mean): 64 (06 Jun 2025 13:00) (64 - 64)  RR: 15 (07 Jun 2025 07:00) (11 - 24)  SpO2: 100% (07 Jun 2025 07:00) (94% - 100%)    Parameters below as of 07 Jun 2025 04:00  Patient On (Oxygen Delivery Method): nasal cannula  O2 Flow (L/min): 2      06-06-25 @ 07:01  -  06-07-25 @ 07:00  --------------------------------------------------------  IN: 3299.9 mL / OUT: 3361 mL / NET: -61.1 mL        Physical Exam:  Gen: NAD  Spine:   Dressing CDI  JPx3 in place  Sensation: [x] intact to light touch  [] decreased:   Motor exam:          Lower extremity                        HF         KF        KE       DF         PF                                                  R        2/5*        5/5        5/5       5/5         5/5                                               L         3/5*        5/5       5/5       5/5          5/5                                                *pain limited     BLE: WWP, compartments soft and compressible    LABS:                        9.1    13.11 )-----------( 122      ( 07 Jun 2025 06:32 )             25.9     06-07    138  |  104  |  13  ----------------------------<  153[H]  2.9[LL]   |  25  |  0.50    Ca    7.5[L]      07 Jun 2025 00:44  Phos  1.3     06-07  Mg     1.80     06-07

## 2025-06-07 NOTE — PROGRESS NOTE ADULT - ASSESSMENT
74 y/o female PMH of HTN, SILVANA, PAF on Eliquis PE (in 1980s) OA, hiatal hernia (repair 2023) gastroparesis, peripheral neuropathy, SILVANA on CPAP, and extensive prior spinal surgery now s/p 6/4 Open lumbar laminectomy, Posterior thoracic laminectomy, T3-pelvis decompression/fusion with L4 pedicle osteotomy and Myocutaneous flap closure with plastic surgery. Patient required increasing amounts of phenylephrine intra-op and was therefore not extubated at case end. SICU consulted for Q1 neuro-checks, ventilator management and hemodynamic monitoring. Patient extubated POD1.     NEUROLOGIC    - Pain control: IV tylenol, flexeril, lyrica, PO dilaudid, IV dilaudid for breakthrough  - Q2 neuro-checks  - s/p CT stroke protocol: no acute findings  - MRI brain noncon pending  - Post-op films when able to stand  - TLSO brace when able    RESPIRATORY   - Monitor SpO2 goal >92%  - Extubated to NC, wean as tolerated    CARDIOVASCULAR   - Monitor hemodynamics, MAP goal > 65  - levo, wean as tolerating  - Trend lactate    GASTROINTESTINAL   - Diet: NPO except meds  - Protonix    /RENAL   - IV fluids: D5 LR @75 given down trending finger sticks  - Maintain dumont catheter, strict Is/Os  - Monitor electrolytes, replete PRN    HEMATOLOGIC  - Trend H/H q6 CBC   - s/p 1 U PRBC 6/5 AM shift; 1 U PRBC 1 U FFP 1 U Plts PM shift  - s/p 2 U PRBC 6/6 AM shift; 1 U cryo 6/6 PM shift  - DVT ppx: SCD's  - HOLDing chemical DVT ppx  - HOLDing home eliquis  - Screening BLE dopplers negative for DVTs    INFECTIOUS DISEASE  - Monitor fever / WBC  - s/p Ancef x 24 hrs    ENDOCRINE  - Monitor gluc  - ISS     LINES  - Left axillary A-line (6/4 - )  - Dumont (6/4 - )  - RADHA x 3 (back, 2 deep, 1 superficial)  - PIV     DISPO: SICU

## 2025-06-07 NOTE — PROGRESS NOTE ADULT - SUBJECTIVE AND OBJECTIVE BOX
NSCU ATTENDING eICU CONSULT NOTE    Rounded with fellow @ bedside.      RLE slightly weaker on exam but otherwise improving, symmetrically antigravity throughout.    Recommendations:    -- CBC stable, Hgb >9, and now off pressors, can switch to daily CBCs to minimize further blood draws  -- Do not feel MRI brain is necessary at this time    Call back with any concerns, [phone] or Teams eICU [facility] thread.    30 minutes spent total including documentation.

## 2025-06-08 LAB
ANION GAP SERPL CALC-SCNC: 11 MMOL/L — SIGNIFICANT CHANGE UP (ref 7–14)
BUN SERPL-MCNC: 10 MG/DL — SIGNIFICANT CHANGE UP (ref 7–23)
CALCIUM SERPL-MCNC: 7.9 MG/DL — LOW (ref 8.4–10.5)
CHLORIDE SERPL-SCNC: 102 MMOL/L — SIGNIFICANT CHANGE UP (ref 98–107)
CO2 SERPL-SCNC: 25 MMOL/L — SIGNIFICANT CHANGE UP (ref 22–31)
CREAT SERPL-MCNC: 0.49 MG/DL — LOW (ref 0.5–1.3)
EGFR: 97 ML/MIN/1.73M2 — SIGNIFICANT CHANGE UP
EGFR: 97 ML/MIN/1.73M2 — SIGNIFICANT CHANGE UP
GLUCOSE BLDC GLUCOMTR-MCNC: 106 MG/DL — HIGH (ref 70–99)
GLUCOSE BLDC GLUCOMTR-MCNC: 106 MG/DL — HIGH (ref 70–99)
GLUCOSE BLDC GLUCOMTR-MCNC: 109 MG/DL — HIGH (ref 70–99)
GLUCOSE BLDC GLUCOMTR-MCNC: 118 MG/DL — HIGH (ref 70–99)
GLUCOSE BLDC GLUCOMTR-MCNC: 37 MG/DL — CRITICAL LOW (ref 70–99)
GLUCOSE BLDC GLUCOMTR-MCNC: 56 MG/DL — LOW (ref 70–99)
GLUCOSE BLDC GLUCOMTR-MCNC: 64 MG/DL — LOW (ref 70–99)
GLUCOSE BLDC GLUCOMTR-MCNC: 92 MG/DL — SIGNIFICANT CHANGE UP (ref 70–99)
GLUCOSE BLDC GLUCOMTR-MCNC: 95 MG/DL — SIGNIFICANT CHANGE UP (ref 70–99)
GLUCOSE SERPL-MCNC: 100 MG/DL — HIGH (ref 70–99)
HCT VFR BLD CALC: 24.1 % — LOW (ref 34.5–45)
HGB BLD-MCNC: 8.4 G/DL — LOW (ref 11.5–15.5)
MAGNESIUM SERPL-MCNC: 1.9 MG/DL — SIGNIFICANT CHANGE UP (ref 1.6–2.6)
MCHC RBC-ENTMCNC: 29.6 PG — SIGNIFICANT CHANGE UP (ref 27–34)
MCHC RBC-ENTMCNC: 34.9 G/DL — SIGNIFICANT CHANGE UP (ref 32–36)
MCV RBC AUTO: 84.9 FL — SIGNIFICANT CHANGE UP (ref 80–100)
NRBC # BLD AUTO: 0.02 K/UL — HIGH (ref 0–0)
NRBC # FLD: 0.02 K/UL — HIGH (ref 0–0)
NRBC BLD AUTO-RTO: 0 /100 WBCS — SIGNIFICANT CHANGE UP (ref 0–0)
PHOSPHATE SERPL-MCNC: 2.9 MG/DL — SIGNIFICANT CHANGE UP (ref 2.5–4.5)
PLATELET # BLD AUTO: 144 K/UL — LOW (ref 150–400)
POTASSIUM SERPL-MCNC: 4 MMOL/L — SIGNIFICANT CHANGE UP (ref 3.5–5.3)
POTASSIUM SERPL-SCNC: 4 MMOL/L — SIGNIFICANT CHANGE UP (ref 3.5–5.3)
RBC # BLD: 2.84 M/UL — LOW (ref 3.8–5.2)
RBC # FLD: 14.6 % — HIGH (ref 10.3–14.5)
SODIUM SERPL-SCNC: 138 MMOL/L — SIGNIFICANT CHANGE UP (ref 135–145)
WBC # BLD: 11.16 K/UL — HIGH (ref 3.8–10.5)
WBC # FLD AUTO: 11.16 K/UL — HIGH (ref 3.8–10.5)

## 2025-06-08 PROCEDURE — 99232 SBSQ HOSP IP/OBS MODERATE 35: CPT | Mod: GC

## 2025-06-08 PROCEDURE — G0425: CPT | Mod: 93

## 2025-06-08 RX ORDER — INSULIN LISPRO 100 U/ML
INJECTION, SOLUTION INTRAVENOUS; SUBCUTANEOUS
Refills: 0 | Status: DISCONTINUED | OUTPATIENT
Start: 2025-06-08 | End: 2025-06-17

## 2025-06-08 RX ADMIN — Medication 3 MILLILITER(S): at 13:18

## 2025-06-08 RX ADMIN — PRAMIPEXOLE DIHYDROCHLORIDE 1.25 MILLIGRAM(S): 1 TABLET ORAL at 05:30

## 2025-06-08 RX ADMIN — METOPROLOL SUCCINATE 50 MILLIGRAM(S): 50 TABLET, EXTENDED RELEASE ORAL at 05:29

## 2025-06-08 RX ADMIN — Medication 3 MILLILITER(S): at 22:38

## 2025-06-08 RX ADMIN — Medication 4 MILLIGRAM(S): at 08:08

## 2025-06-08 RX ADMIN — PRAMIPEXOLE DIHYDROCHLORIDE 1.25 MILLIGRAM(S): 1 TABLET ORAL at 22:19

## 2025-06-08 RX ADMIN — Medication 4 MILLIGRAM(S): at 12:31

## 2025-06-08 RX ADMIN — PRAMIPEXOLE DIHYDROCHLORIDE 1.25 MILLIGRAM(S): 1 TABLET ORAL at 13:55

## 2025-06-08 RX ADMIN — Medication 2 TABLET(S): at 22:19

## 2025-06-08 RX ADMIN — Medication 4 MILLIGRAM(S): at 17:20

## 2025-06-08 RX ADMIN — Medication 4 MILLIGRAM(S): at 13:00

## 2025-06-08 RX ADMIN — METOPROLOL SUCCINATE 50 MILLIGRAM(S): 50 TABLET, EXTENDED RELEASE ORAL at 17:19

## 2025-06-08 RX ADMIN — PREGABALIN 100 MILLIGRAM(S): 50 CAPSULE ORAL at 17:19

## 2025-06-08 RX ADMIN — CYCLOBENZAPRINE HYDROCHLORIDE 10 MILLIGRAM(S): 15 CAPSULE, EXTENDED RELEASE ORAL at 05:31

## 2025-06-08 RX ADMIN — Medication 40 MILLIGRAM(S): at 12:31

## 2025-06-08 RX ADMIN — ENOXAPARIN SODIUM 40 MILLIGRAM(S): 100 INJECTION SUBCUTANEOUS at 20:20

## 2025-06-08 RX ADMIN — Medication 4 MILLIGRAM(S): at 08:30

## 2025-06-08 RX ADMIN — PREGABALIN 100 MILLIGRAM(S): 50 CAPSULE ORAL at 05:29

## 2025-06-08 RX ADMIN — Medication 1 APPLICATION(S): at 12:33

## 2025-06-08 RX ADMIN — CYCLOBENZAPRINE HYDROCHLORIDE 10 MILLIGRAM(S): 15 CAPSULE, EXTENDED RELEASE ORAL at 17:20

## 2025-06-08 RX ADMIN — POLYETHYLENE GLYCOL 3350 17 GRAM(S): 17 POWDER, FOR SOLUTION ORAL at 12:33

## 2025-06-08 RX ADMIN — Medication 1 APPLICATION(S): at 12:34

## 2025-06-08 RX ADMIN — Medication 3 MILLILITER(S): at 06:22

## 2025-06-08 RX ADMIN — Medication 4 MILLIGRAM(S): at 23:39

## 2025-06-08 NOTE — PROVIDER CONTACT NOTE (HYPOGLYCEMIA EVENT) - NS PROVIDER CONTACT ASSESS-HYPO
Pt blood sugar taken for 0600 check, found to be 56. retaken and found to be 64.  Pt blood sugar taken for 0600 check, found to be 56. retaken and found to be 64. Asymptomatic, no diaphoresis, dizziness, change in LOC. Dr. Yadiel Peters notified.

## 2025-06-08 NOTE — PROGRESS NOTE ADULT - SUBJECTIVE AND OBJECTIVE BOX
Patient seen and examined at bedside.    --Anticoagulation--  enoxaparin Injectable 40 milliGRAM(s) SubCutaneous every 24 hours    T(C): 37.3 (06-08-25 @ 00:00), Max: 37.3 (06-08-25 @ 00:00)  HR: 88 (06-08-25 @ 00:00) (75 - 105)  BP: --  RR: 23 (06-08-25 @ 00:00) (15 - 23)  SpO2: 100% (06-08-25 @ 00:00) (97% - 100%)  Wt(kg): --    Exam:  AOx2 (person, place), age appropriate, follows commands  PERRL, EOMI, face symmetrical   Sensation intact to light touch   LUE 5/5, RUE 4/5 pain limited at shoulder   b/l LE 4/5 (pain limited on exam)  No pronator drift   Incision C/D/I   No clonus  No hoffmans

## 2025-06-08 NOTE — PROVIDER CONTACT NOTE (HYPOGLYCEMIA EVENT) - NS PROVIDER CONTACT BACKGROUND-HYPO
Age: 75y    Gender: Female    POCT Blood Glucose:  95 mg/dL (06-08-25 @ 06:03)  64 mg/dL (06-08-25 @ 05:37)  56 mg/dL (06-08-25 @ 05:35)  109 mg/dL (06-08-25 @ 00:50)  117 mg/dL (06-07-25 @ 16:58)  140 mg/dL (06-07-25 @ 11:53)  158 mg/dL (06-07-25 @ 06:29)      eMAR:  insulin lispro (ADMELOG) corrective regimen sliding scale   1 Unit(s) SubCutaneous (06-07-25 @ 06:47)

## 2025-06-08 NOTE — PROGRESS NOTE ADULT - ASSESSMENT
75yFemale s/p T4-pelvis post lami, PSF    -pain control prn  -incentive spirometry  -DVT ppx: SCDs, lovenox   -PT/OT  -care per SICU and neurosurgery Patient notified.  Patient verbalized understanding of information given.     Order was faxed to [facility] - phone number given to patient to set up appointment.

## 2025-06-08 NOTE — PROGRESS NOTE ADULT - SUBJECTIVE AND OBJECTIVE BOX
SICU PROGRESS NOTE:    24 HOUR INTERVAL EVENTS:  - Advanced to clears, IVL  - DVT ppx- Lovenox  - Bowel regimen  - Neurocheck advanced to q4hrs  - No need for MRI per NSX    NEURO  Exam: A&O x2, moving all 4 extremities  Meds: cyclobenzaprine 10 milliGRAM(s) Oral two times a day  HYDROmorphone   Tablet 2 milliGRAM(s) Oral every 4 hours PRN Moderate Pain (4 - 6)  HYDROmorphone   Tablet 4 milliGRAM(s) Oral every 4 hours PRN Severe Pain (7 - 10)  HYDROmorphone  Injectable 0.5 milliGRAM(s) IV Push every 4 hours PRN breakthrough  ondansetron Injectable 4 milliGRAM(s) IV Push every 6 hours PRN Nausea  pramipexole 1.25 milliGRAM(s) Oral three times a day  pregabalin 100 milliGRAM(s) Oral two times a day  [x] Adequacy of sedation and pain control has been assessed and adjusted    RESPIRATORY  RR: 23 (06-08-25 @ 00:00) (15 - 23)  SpO2: 100% (06-08-25 @ 00:00) (97% - 100%)  Wt(kg): --  Exam: unlabored, symmetric chest rise, on room air  Mechanical Ventilation:   ABG - ( 06 Jun 2025 06:22 )  pH: 7.41  /  pCO2: 40    /  pO2: 90    / HCO3: 25    / Base Excess: 0.7   /  SaO2: 98.5    Lactate: x        CARDIOVASCULAR  HR: 88 (06-08-25 @ 00:00) (75 - 105)  ABP: 120/50 (06-08-25 @ 00:00) (108/46 - 179/78)  ABP(mean): 79 (06-08-25 @ 00:00) (70 - 119)    Exam: regular rate and rhythm  Cardiac Rhythm: sinus  Perfusion     [x]Adequate   [ ]Inadequate  Mentation   [x]Normal       [ ]Reduced  Extremities  [x]Warm         [ ]Cool  Volume Status [ ]Hypervolemic [x]Euvolemic [ ]Hypovolemic  Meds: metoprolol tartrate 50 milliGRAM(s) Oral two times a day    GI/NUTRITION  Exam: soft, nontender, nondistended  Diet: Clears  Meds: pantoprazole  Injectable 40 milliGRAM(s) IV Push daily  polyethylene glycol 3350 17 Gram(s) Oral daily  senna 2 Tablet(s) Oral at bedtime    GENITOURINARY  I&O's Detail    06-06 @ 07:01  -  06-07 @ 07:00  --------------------------------------------------------  IN:    Cryoprecipitate: 125 mL    dextrose 5% + lactated ringers: 1350 mL    dextrose 5% + sodium chloride 0.45% w/ Additives: 375 mL    IV PiggyBack: 799.9 mL    Oral Fluid: 50 mL    PRBCs (Packed Red Blood Cells): 600 mL  Total IN: 3299.9 mL    OUT:    Bulb (mL): 75 mL    Bulb (mL): 38 mL    Bulb (mL): 18 mL    Indwelling Catheter - Urethral (mL): 3230 mL  Total OUT: 3361 mL    Total NET: -61.1 mL    06-07 @ 07:01 - 06-08 @ 02:41  --------------------------------------------------------  IN:    dextrose 5% + sodium chloride 0.45% w/ Additives: 300 mL    IV PiggyBack: 650 mL    Oral Fluid: 350 mL  Total IN: 1300 mL    OUT:    Bulb (mL): 15 mL    Bulb (mL): 105 mL    Bulb (mL): 50 mL    Indwelling Catheter - Urethral (mL): 3395 mL  Total OUT: 3565 mL    Total NET: -2265 mL    06-08    138  |  102  |  10  ----------------------------<  100[H]  4.0   |  25  |  0.49[L]    Ca    7.9[L]      08 Jun 2025 00:50  Phos  2.9     06-08  Mg     1.90     06-08    [x] Blake catheter, indication: N/A  Meds: sodium chloride 0.9% lock flush 3 milliLiter(s) IV Push every 8 hours    HEMATOLOGIC  Meds: enoxaparin Injectable 40 milliGRAM(s) SubCutaneous every 24 hours    [x] VTE Prophylaxis                        8.4    11.16 )-----------( 144      ( 08 Jun 2025 00:50 )             24.1     PT/INR - ( 06 Jun 2025 06:22 )   PT: 13.8 sec;   INR: 1.16 ratio         PTT - ( 06 Jun 2025 06:22 )  PTT:26.5 sec  Transfusion     [ ] PRBC   [ ] Platelets   [ ] FFP   [ ] Cryoprecipitate    INFECTIOUS DISEASES  WBC Count: 11.16 K/uL (06-08 @ 00:50)  WBC Count: 12.63 K/uL (06-07 @ 13:45)  WBC Count: 13.11 K/uL (06-07 @ 06:32)    ENDOCRINE  CAPILLARY BLOOD GLUCOSE  POCT Blood Glucose.: 109 mg/dL (08 Jun 2025 00:50)  POCT Blood Glucose.: 117 mg/dL (07 Jun 2025 16:58)  POCT Blood Glucose.: 140 mg/dL (07 Jun 2025 11:53)  POCT Blood Glucose.: 158 mg/dL (07 Jun 2025 06:29)    Meds: insulin lispro (ADMELOG) corrective regimen sliding scale   SubCutaneous every 6 hours

## 2025-06-08 NOTE — CHART NOTE - NSCHARTNOTEFT_GEN_A_CORE
Arterial line removed. Advanced to regular diet. Blake removed, pending tov. Pt going to 9T 904A. Signed out to neurosurgery.

## 2025-06-08 NOTE — PROGRESS NOTE ADULT - SUBJECTIVE AND OBJECTIVE BOX
ORTHOPEDIC PROGRESS NOTE    SUBJECTIVE:  Pt seen and examined at bedside this am.  Doing well.  No acute events overnight.  Pt states pain is well controlled. Worked with PT/OT. Denies numbness/tingling in LEs      OBJECTIVE:  Vital Signs Last 24 Hrs  T(C): 36.8 (08 Jun 2025 08:00), Max: 37.3 (08 Jun 2025 00:00)  T(F): 98.3 (08 Jun 2025 08:00), Max: 99.2 (08 Jun 2025 00:00)  HR: 84 (08 Jun 2025 08:00) (75 - 100)  BP: --  BP(mean): --  RR: 19 (08 Jun 2025 08:00) (15 - 23)  SpO2: 100% (08 Jun 2025 08:00) (97% - 100%)    Parameters below as of 08 Jun 2025 08:00  Patient On (Oxygen Delivery Method): nasal cannula  O2 Flow (L/min): 2      Physical Exam:  Gen: NAD  Spine:   Dressing CDI  JPx3 in place  Sensation: [x] intact to light touch  [] decreased:   Motor exam:          Lower extremity                        HF         KF        KE       DF         PF                                                  R        3+/5       5/5        5/5       5/5         5/5                                               L         3+/5        5/5       5/5       5/5          5/5                                                BLE: WWP, compartments soft and compressible      LABS                        8.4    11.16 )-----------( 144      ( 08 Jun 2025 00:50 )             24.1       06-08    138  |  102  |  10  ----------------------------<  100[H]  4.0   |  25  |  0.49[L]    Ca    7.9[L]      08 Jun 2025 00:50  Phos  2.9     06-08  Mg     1.90     06-08            I&O's Summary    07 Jun 2025 07:01  -  08 Jun 2025 07:00  --------------------------------------------------------  IN: 1420 mL / OUT: 4355 mL / NET: -2935 mL    08 Jun 2025 07:01  -  08 Jun 2025 10:20  --------------------------------------------------------  IN: 240 mL / OUT: 122 mL / NET: 118 mL        bacitracin   Ointment 1 Application(s) Topical daily  chlorhexidine 2% Cloths 1 Application(s) Topical daily  cyclobenzaprine 10 milliGRAM(s) Oral two times a day  enoxaparin Injectable 40 milliGRAM(s) SubCutaneous every 24 hours  HYDROmorphone   Tablet 2 milliGRAM(s) Oral every 4 hours PRN  HYDROmorphone   Tablet 4 milliGRAM(s) Oral every 4 hours PRN  HYDROmorphone  Injectable 0.5 milliGRAM(s) IV Push every 4 hours PRN  insulin lispro (ADMELOG) corrective regimen sliding scale   SubCutaneous Before meals and at bedtime  metoprolol tartrate 50 milliGRAM(s) Oral two times a day  naloxone Injectable 0.1 milliGRAM(s) IV Push every 3 minutes PRN  ondansetron Injectable 4 milliGRAM(s) IV Push every 6 hours PRN  pantoprazole  Injectable 40 milliGRAM(s) IV Push daily  polyethylene glycol 3350 17 Gram(s) Oral daily  pramipexole 1.25 milliGRAM(s) Oral three times a day  pregabalin 100 milliGRAM(s) Oral two times a day  senna 2 Tablet(s) Oral at bedtime  sodium chloride 0.9% lock flush 3 milliLiter(s) IV Push every 8 hours

## 2025-06-08 NOTE — PROGRESS NOTE ADULT - SUBJECTIVE AND OBJECTIVE BOX
NSCU ATTENDING eICU CONSULT NOTE    Rounded with fellow @ bedside.      Sleepier today but opens eyes to voice, oriented with choices, pain limited LE exam otherwise without focal weakness.    Recommendations:    -- CBC drifting down again with frequent blood draws; please switch to daily CBCs to minimize further blood loss if patient is hemodynamically stable  -- Defer MRI, recovering as expected    Call back with any concerns, [phone] or Teams eICU [facility] thread.    30 minutes spent total including documentation.

## 2025-06-08 NOTE — PROGRESS NOTE ADULT - ASSESSMENT
Assessment: 75yr F PMH of HTN, SILVANA, PAF on Eliquis PE (in 1980s) OA, hiatal hernia (repair 2023) gastroparesis, peripheral neuropathy, SILVANA on CPAP, and extensive prior spinal surgery now s/p 6/4 Open lumbar laminectomy, Posterior thoracic laminectomy, T3-pelvis decompression/fusion with L4 pedicle osteotomy and Myocutaneous flap closure with plastic surgery. Patient required increasing amounts of phenylephrine intra-op and was therefore not extubated at case end. SICU consulted for Q1 neuro-checks, ventilator management and hemodynamic monitoring. Patient extubated POD1.     Plan:  NEUROLOGIC    - Pain control: IV tylenol, flexeril, lyrica, PO dilaudid, IV dilaudid for breakthrough  - Pramipexol restarted  - Q4 neuro-checks  - s/p CTH stroke protocol: no acute findings  - Post-op films when able to stand  - TLSO brace when able    RESPIRATORY   - Monitor SpO2 goal >92%  - Extubated to NC, wean as tolerated    CARDIOVASCULAR   - Monitor hemodynamics, MAP goal > 65  - off levo  - Trend lactate    GASTROINTESTINAL   - Diet: clears  - Protonix    /RENAL   - Maintain dumont catheter, strict Is/Os  - Monitor electrolytes, replete PRN    HEMATOLOGIC  - daily CBC as hbg now stable  - s/p 1 U PRBC 6/5 AM shift; 1 U PRBC 1 U FFP 1 U Plts PM shift  - s/p 2 U PRBC 6/6 AM shift; 1 U cryo 6/6 PM shift  - DVT ppx: SCD's, Lovenox  - HOLDing home eliquis  - Screening BLE dopplers negative for DVTs    INFECTIOUS DISEASE  - Monitor fever / WBC  - s/p Ancef x 24 hrs    ENDOCRINE  - Monitor gluc  - ISS     LINES  - Left axillary A-line (6/4 - )  - Dumont (6/4 - )  - RADHA x 3 (back, 2 deep, 1 superficial)  - PIV     DISPO: SICU

## 2025-06-08 NOTE — PROGRESS NOTE ADULT - ASSESSMENT
76 y/o female PMH of HTN, SILVANA, PAF on Eliquis PE (in 1980s) OA, hiatal hernia (repair 2023) gastroparesis, peripheral neuropathy, SILVANA on CPAP, and extensive prior spinal surgery (last in 09/2024-T9 partial corpectomy, had paraspinal abscess drained in 10/2024) presented as SDA for T3 to pelvis fusion and instrumention with L4 PSO on 6/4/25.     6/4 OR for T3 to pelvis fusion and instrumention with L4 PSO, with Dr. Stroud (neurosurgery), Dr. Gray (ortho), and Dr. Bolton (plastics), closed with nylons. 4uPRBCs given intraop, has tim bui, drains per plastics. Low pressures at end of case, pt required increasing phenylephrine intraop. 500 cc LR bolus given for increasing vasopressor requirements and elevated lactate.     6/5 POD 1, intubated and sedated (partially weaned for exam), MAEx4 spont under wrist restraints, incision c/d/i. On ancef 24h postop. Postop Hg 11.0, ramya /70.  6/7 POD 3,  H/H 8.2/23.4, trending cbc every 12h as long as patient is hemodynamically stable, hypokalemic this morning to 2.9 given potassium chloride tablets for repletion. On exam, Ms. French is more alert to person and place.            Problem/Plan - 1:  ·  Problem: S/P fusion of thoracic spine.   -exam improved   - TLSO brace when OOB  - cont with q2 NC  - Post-op films when able to stand  - pain control

## 2025-06-08 NOTE — PROGRESS NOTE ADULT - ATTENDING COMMENTS
As noted above
Patient no events overnight. D/c camilo bui, q4 hr neurochecks. Floor eligible.    I have personally interviewed when possible and examined the patient, reviewed data and laboratory tests/x-rays and all pertinent electronic images.  I was physically present for the key portions of the evaluation and management (E/M) service provided.   The SICU team has a constant risk benefit analyzes discussion with the primary team, all consultants, House Staff and PA's on all decisions.  These diagnoses are unrelated to the surgical procedure noted above.  I meet with family if needed to get further history, discuss the case and make care decisions for this patient who might not be able to participate.  Time involved in performance of separately billable procedures was not counted toward my critical care time. There is no overlap.  I spent 30 minutes ( 0800Hrs-0915Hrs in AM/ 1600Hrs-1715Hrs in PM, or other time indicated) of critical care time for the diagnoses, assessment, plan and interventions.  This time excludes time spent on separate procedures and teaching.
No mri required, improved mental status. CBC stable, will monitor daily. q4 hr neurochecks. Floor eligible tomorrow.    I have personally interviewed when possible and examined the patient, reviewed data and laboratory tests/x-rays and all pertinent electronic images.  I was physically present for the key portions of the evaluation and management (E/M) service provided.   The SICU team has a constant risk benefit analyzes discussion with the primary team, all consultants, House Staff and PA's on all decisions.  These diagnoses are unrelated to the surgical procedure noted above.  I meet with family if needed to get further history, discuss the case and make care decisions for this patient who might not be able to participate.  Time involved in performance of separately billable procedures was not counted toward my critical care time. There is no overlap.  I spent 30 minutes ( 0800Hrs-0915Hrs in AM/ 1600Hrs-1715Hrs in PM, or other time indicated) of critical care time for the diagnoses, assessment, plan and interventions.  This time excludes time spent on separate procedures and teaching.
Patient s/p spinal fusion with nsx after fracture. Patient small drift in hemoglobin and hypotensive requiring vasopressors  N mentating, pain controlled  resp on room air  cv on small dose cullen, hypovolemic, given fluid bolus q6 cbc  gi npo, ivf  gu/renal adequate uop  heme vte ppx held  id endo no changes    The patient is a critical care patient with life threatening hemodynamic and metabolic instability in SICU.  I have personally interviewed when possible and examined the patient, reviewed data and laboratory tests/x-rays and all pertinent electronic images.  I was physically present for the key portions of the evaluation and management (E/M) service provided.   The SICU team has a constant risk benefit analyzes discussion with the primary team, all consultants, House Staff and PA's on all decisions.  These diagnoses are unrelated to the surgical procedure noted above.  I meet with family if needed to get further history, discuss the case and make care decisions for this patient who might not be able to participate.  Time involved in performance of separately billable procedures was not counted toward my critical care time. There is no overlap.  I spent 55-75 minutes ( 0800Hrs-0915Hrs in AM/ 1600Hrs-1715Hrs in PM, or other time indicated) of critical care time for the diagnoses, assessment, plan and interventions.  This time excludes time spent on separate procedures and teaching.
As noted above.
76 y/o female PMH of HTN, SILVANA, PAF on Eliquis PE (in 1980s) OA, hiatal hernia (repair 2023) gastroparesis, peripheral neuropathy, SILVANA on CPAP, and extensive prior spinal surgery now s/p 6/4 Open lumbar laminectomy, Posterior thoracic laminectomy, T3-pelvis decompression/fusion with L4 pedicle osteotomy and Myocutaneous flap closure with plastic surgery. Patient required increasing amounts of phenylephrine intra-op and was therefore not extubated at case end. SICU consulted for Q1 neuro-checks, ventilator management and hemodynamic monitoring.     # T4-Pelvis Fusion, Flap  # POD 1  - Monitor drain outputs.   - Q1 neuro checks.   - Post-op films when able to stand  - TLSO brace when able    # Ventilator Status  - SBT, wean to extubate.     # Hypotension  - Wean pressors as able.   - Evidence of diminished peripheral perfusion.   - Transfuse 1 PRBC given high drain outputs and exam.      # Risk of Malnutrition  - Will place tube for eventual enteral feeding.     # History of PE  # PPX  - Holding chemical DVT PPX for now given bleeding.   - Last dose eliquis 6/3.   - F/u screening LE duplex.     # LINES  - Left axillary A-line (6/4 - )  - Blake (6/4 - )  - RADHA x 3 (back, 2 deep, 1 superficial)  - PIV     DISPO: SICU    I have personally seen, examined, reviewed pertinent labs/imaging, and fully participated in the care of this patient on this date.  I have made amendments to the documentation where necessary, and otherwise agree with the history, physical exam, and plan as documented by the SICU resident/PA team.     60 minutes in total were spent in providing direct critical care for the diagnoses/assessment/plan as outlined in this note, documentation, and decision-making.  This patient suffers from a critical illness that acutely impairs one or more vital organ systems such that there is a high probability of life threatening or imminent deterioration of the patient's condition.      Additionally, time spent in teaching or the performance of separately billable procedures was not counted toward my critical care time.  There is no overlap.  Time included review of vitals, labs, imaging, discussion with consultants.

## 2025-06-09 LAB
ANION GAP SERPL CALC-SCNC: 10 MMOL/L — SIGNIFICANT CHANGE UP (ref 7–14)
BUN SERPL-MCNC: 14 MG/DL — SIGNIFICANT CHANGE UP (ref 7–23)
CALCIUM SERPL-MCNC: 8.1 MG/DL — LOW (ref 8.4–10.5)
CHLORIDE SERPL-SCNC: 103 MMOL/L — SIGNIFICANT CHANGE UP (ref 98–107)
CO2 SERPL-SCNC: 25 MMOL/L — SIGNIFICANT CHANGE UP (ref 22–31)
CREAT SERPL-MCNC: 0.63 MG/DL — SIGNIFICANT CHANGE UP (ref 0.5–1.3)
EGFR: 91 ML/MIN/1.73M2 — SIGNIFICANT CHANGE UP
EGFR: 91 ML/MIN/1.73M2 — SIGNIFICANT CHANGE UP
GLUCOSE BLDC GLUCOMTR-MCNC: 104 MG/DL — HIGH (ref 70–99)
GLUCOSE BLDC GLUCOMTR-MCNC: 116 MG/DL — HIGH (ref 70–99)
GLUCOSE BLDC GLUCOMTR-MCNC: 175 MG/DL — HIGH (ref 70–99)
GLUCOSE BLDC GLUCOMTR-MCNC: 81 MG/DL — SIGNIFICANT CHANGE UP (ref 70–99)
GLUCOSE SERPL-MCNC: 85 MG/DL — SIGNIFICANT CHANGE UP (ref 70–99)
HCT VFR BLD CALC: 26.4 % — LOW (ref 34.5–45)
HGB BLD-MCNC: 8.9 G/DL — LOW (ref 11.5–15.5)
MAGNESIUM SERPL-MCNC: 2 MG/DL — SIGNIFICANT CHANGE UP (ref 1.6–2.6)
MCHC RBC-ENTMCNC: 29.7 PG — SIGNIFICANT CHANGE UP (ref 27–34)
MCHC RBC-ENTMCNC: 33.7 G/DL — SIGNIFICANT CHANGE UP (ref 32–36)
MCV RBC AUTO: 88 FL — SIGNIFICANT CHANGE UP (ref 80–100)
NRBC # BLD AUTO: 0 K/UL — SIGNIFICANT CHANGE UP (ref 0–0)
NRBC # FLD: 0 K/UL — SIGNIFICANT CHANGE UP (ref 0–0)
NRBC BLD AUTO-RTO: 0 /100 WBCS — SIGNIFICANT CHANGE UP (ref 0–0)
PHOSPHATE SERPL-MCNC: 2.4 MG/DL — LOW (ref 2.5–4.5)
PLATELET # BLD AUTO: 173 K/UL — SIGNIFICANT CHANGE UP (ref 150–400)
POTASSIUM SERPL-MCNC: 4.1 MMOL/L — SIGNIFICANT CHANGE UP (ref 3.5–5.3)
POTASSIUM SERPL-SCNC: 4.1 MMOL/L — SIGNIFICANT CHANGE UP (ref 3.5–5.3)
RBC # BLD: 3 M/UL — LOW (ref 3.8–5.2)
RBC # FLD: 15 % — HIGH (ref 10.3–14.5)
SODIUM SERPL-SCNC: 138 MMOL/L — SIGNIFICANT CHANGE UP (ref 135–145)
WBC # BLD: 8.48 K/UL — SIGNIFICANT CHANGE UP (ref 3.8–10.5)
WBC # FLD AUTO: 8.48 K/UL — SIGNIFICANT CHANGE UP (ref 3.8–10.5)

## 2025-06-09 PROCEDURE — 72141 MRI NECK SPINE W/O DYE: CPT | Mod: 26

## 2025-06-09 PROCEDURE — 72020 X-RAY EXAM OF SPINE 1 VIEW: CPT | Mod: 26

## 2025-06-09 PROCEDURE — 99232 SBSQ HOSP IP/OBS MODERATE 35: CPT

## 2025-06-09 PROCEDURE — 72100 X-RAY EXAM L-S SPINE 2/3 VWS: CPT | Mod: 26

## 2025-06-09 PROCEDURE — 70551 MRI BRAIN STEM W/O DYE: CPT | Mod: 26

## 2025-06-09 RX ADMIN — Medication 4 MILLIGRAM(S): at 17:48

## 2025-06-09 RX ADMIN — Medication 40 MILLIGRAM(S): at 13:11

## 2025-06-09 RX ADMIN — Medication 1 APPLICATION(S): at 13:14

## 2025-06-09 RX ADMIN — Medication 3 MILLILITER(S): at 05:48

## 2025-06-09 RX ADMIN — Medication 1 APPLICATION(S): at 13:11

## 2025-06-09 RX ADMIN — CYCLOBENZAPRINE HYDROCHLORIDE 10 MILLIGRAM(S): 15 CAPSULE, EXTENDED RELEASE ORAL at 05:35

## 2025-06-09 RX ADMIN — METOPROLOL SUCCINATE 50 MILLIGRAM(S): 50 TABLET, EXTENDED RELEASE ORAL at 05:35

## 2025-06-09 RX ADMIN — INSULIN LISPRO 1: 100 INJECTION, SOLUTION INTRAVENOUS; SUBCUTANEOUS at 11:33

## 2025-06-09 RX ADMIN — ENOXAPARIN SODIUM 40 MILLIGRAM(S): 100 INJECTION SUBCUTANEOUS at 22:25

## 2025-06-09 RX ADMIN — Medication 4 MILLIGRAM(S): at 00:35

## 2025-06-09 RX ADMIN — Medication 3 MILLILITER(S): at 22:17

## 2025-06-09 RX ADMIN — Medication 3 MILLILITER(S): at 14:32

## 2025-06-09 RX ADMIN — Medication 4 MILLIGRAM(S): at 23:10

## 2025-06-09 RX ADMIN — Medication 4 MILLIGRAM(S): at 14:32

## 2025-06-09 RX ADMIN — PRAMIPEXOLE DIHYDROCHLORIDE 1.25 MILLIGRAM(S): 1 TABLET ORAL at 13:13

## 2025-06-09 RX ADMIN — Medication 4 MILLIGRAM(S): at 22:25

## 2025-06-09 RX ADMIN — Medication 4 MILLIGRAM(S): at 07:13

## 2025-06-09 RX ADMIN — Medication 4 MILLIGRAM(S): at 18:20

## 2025-06-09 RX ADMIN — POLYETHYLENE GLYCOL 3350 17 GRAM(S): 17 POWDER, FOR SOLUTION ORAL at 13:12

## 2025-06-09 RX ADMIN — PRAMIPEXOLE DIHYDROCHLORIDE 1.25 MILLIGRAM(S): 1 TABLET ORAL at 05:34

## 2025-06-09 RX ADMIN — PRAMIPEXOLE DIHYDROCHLORIDE 1.25 MILLIGRAM(S): 1 TABLET ORAL at 22:26

## 2025-06-09 RX ADMIN — METOPROLOL SUCCINATE 50 MILLIGRAM(S): 50 TABLET, EXTENDED RELEASE ORAL at 17:10

## 2025-06-09 RX ADMIN — CYCLOBENZAPRINE HYDROCHLORIDE 10 MILLIGRAM(S): 15 CAPSULE, EXTENDED RELEASE ORAL at 17:10

## 2025-06-09 RX ADMIN — PREGABALIN 100 MILLIGRAM(S): 50 CAPSULE ORAL at 05:34

## 2025-06-09 RX ADMIN — Medication 4 MILLIGRAM(S): at 13:45

## 2025-06-09 NOTE — PROGRESS NOTE ADULT - PROBLEM SELECTOR PLAN 1
- Neurochecks q4  - TLSO when OOB  - Daily CBC/BMP  - Standing XR when able  - drains per plastics  - Incentive spirometry  - DVT ppx   - Bowel regimen    Pager [phone]   Case discussed with attending neurosurgeon Dr. Stroud

## 2025-06-09 NOTE — PROGRESS NOTE ADULT - ASSESSMENT
76 y/o female PMH of HTN, SILVANA, PAF on Eliquis PE (in 1980s) OA, hiatal hernia (repair 2023) gastroparesis, peripheral neuropathy, SILVANA on CPAP, and extensive prior spinal surgery (last in 09/2024-T9 partial corpectomy, had paraspinal abscess drained in 10/2024) presented as SDA for T3 to pelvis fusion and instrumention with L4 PSO on 6/4/25.     6/4 OR for T3 to pelvis fusion and instrumention with L4 PSO, with Dr. Stroud (neurosurgery), Dr. Gray (ortho), and Dr. Bolton (plastics), closed with nylons. 4uPRBCs given intraop, has tim bui, drains per plastics. Low pressures at end of case, pt required increasing phenylephrine intraop. 500 cc LR bolus given for increasing vasopressor requirements and elevated lactate.     6/5 POD 1, intubated and sedated (partially weaned for exam), MAEx4 spont under wrist restraints, incision c/d/i. On ancef 24h postop. Postop Hg 11.0, ramya /70.  6/7 POD 3,  H/H 8.2/23.4, trending cbc every 12h as long as patient is hemodynamically stable, hypokalemic this morning to 2.9 given potassium chloride tablets for repletion. On exam, Ms. French is more alert to person and place.   6/8 POD 4, H/H stable, exam improving. Deferring MRI for now as exam is improving and less concern for stroke. Daily H/H to decrease blood draws.   6/9 POD 5, Pending repeat H/H, now on floor, lower half of dressing saturated so dressing replaced.

## 2025-06-09 NOTE — PROGRESS NOTE ADULT - SUBJECTIVE AND OBJECTIVE BOX
Pt seen/examined. Doing well. Pain controlled. No acute overnight complaints or events.    T(C): 37.1 (06-09-25 @ 05:34), Max: 37.1 (06-09-25 @ 01:44)  HR: 93 (06-09-25 @ 05:34) (82 - 93)  BP: 119/83 (06-09-25 @ 05:34) (119/83 - 140/54)  RR: 18 (06-09-25 @ 05:34) (18 - 24)  SpO2: 97% (06-09-25 @ 05:34) (94% - 100%)  Wt(kg): --  - Gen: NAD    Physical Exam:  Gen: NAD  Spine:   Dressing CDI  JPx3 in place  Sensation: [x] intact to light touch  [] decreased:   Motor exam:          Lower extremity                        HF         KF        KE       DF         PF                                                  R        3+/5       4+/5        5/5       5/5         5/5                                               L         3+/5        5/5       5/5       5/5          5/5                                                BLE: WWP, compartments soft and compressible      75yFemale s/p T4-pelvis post lami, PSF. Recovering appropriately    -pain control prn  -incentive spirometry  -DVT ppx: SCDs, lovenox   -PT/OT  -care per SICU and neurosurgery  -FU drains w prs

## 2025-06-09 NOTE — PROGRESS NOTE ADULT - SUBJECTIVE AND OBJECTIVE BOX
Patient is a 75y old  Female who presents with a chief complaint of SDA (06 Jun 2025 14:36)      HPI:  76 y/o female PMH of HTN, SILVANA, PAF on Eliquis PE (in 1980s) OA, hiatal hernia (repair 2023) gastroparesis, peripheral neuropathy, SILVANA on CPAP, and extensive prior spinal surgery most recently receiving thoracic lumbar fusion with cage with Dr. Stroud on 9/24, after a fall, complicated by sepsis, and paroxysmal atrial fibrillation, development of pleural effusions, symptomatic orthostatic hypotension, urinary retention, post op anemia, chest pain with spontaneous resolution, and paraspinal abscess s/p paraspinal collection aspiration on 10/25/24 presents to presurgical testing with diagnosis of unspecified fracture thoracic vertebrae, scoliosis, and postlaminectomy syndrome. Pt twisted her back a few weeks ago resulting in vertebral fracture. Pt is scheduled for T4-Pelvis posterior laminectomy and fusion with posterior instrumentation. Possible corpectomy T9. Lumbar osteotomy and illiac fixation.       (22 May 2025 09:58)    BM today  reports 5/10 pain    REVIEW OF SYSTEMS  sleepy  pain- controlled    PAST MEDICAL & SURGICAL HISTORY  H/O seasonal allergies    History of pulmonary embolism    Restless leg syndrome    GERD (gastroesophageal reflux disease)    HTN (hypertension)    OA (osteoarthritis)    Fungal infection of skin    H/O scoliosis    Essential hypertension    Depression    Osteoporosis    Right hip pain    2019 novel coronavirus disease (COVID-19)    Hiatal hernia    History of chronic pain    Peripheral neuropathy    Chronic constipation    Chronic GERD    Closed fracture of thoracic vertebra    MSSA (methicillin susceptible Staphylococcus aureus) infection    SILVANA on CPAP    Paroxysmal atrial fibrillation    PFO (patent foramen ovale)    Hearing loss    Postlaminectomy syndrome    Delayed gastric emptying    Anemia    DM2 (diabetes mellitus, type 2)    S/P repair of paraesophageal hernia    S/P spinal fusion    Scoliosis    S/P spinal surgery    Removal of pin, plate, abigail, or screw    S/P hysterectomy    S/P hip replacement    S/P shoulder surgery    S/P dilatation and curettage    H/O arthroscopy of knee    H/O total knee replacement, right    S/P cataract surgery    History of bilateral hip replacements    History of repair of hiatal hernia    CURRENT FUNCTIONAL STATUS  6/8  Bed Mobility: Rolling/Turning:     · Level of [place]	moderate assist (50% patients effort)  · Physical Assist/Nonphysical Assist	2 person assist; nonverbal cues (demo/gestures); verbal cues; set-up required  · Assistive Device	bed rails    Bed Mobility: Supine to Sit:     · Level of [place]	unable to perform; due to, too much pain ARUN Villarreal aware and bedside    Bed Mobility Analysis:     · Bed Mobility Limitations	decreased ability to use arms for pushing/pulling; impaired ability to control trunk for mobility; decreased ability to use legs for bridging/pushing  · Impairments Contributing to Impaired Bed Mobility	pain; decreased strength; decreased ROM    Sensory Examination:    Sensory Examination:    Grossly Intact:   · Gross Sensory Examination	Grossly Intact      Light Touch Sensation:   · Left UE	within normal limits  · Right UE	within normal limits  · Left LE	within normal limits  · Right LE	within normal limits      FAMILY HISTORY   Family history of abdominal aortic aneurysm (AAA) (Mother)        RECENT LABS/IMAGING  CBC Full  -  ( 09 Jun 2025 05:00 )  WBC Count : 8.48 K/uL  RBC Count : 3.00 M/uL  Hemoglobin : 8.9 g/dL  Hematocrit : 26.4 %  Platelet Count - Automated : 173 K/uL  Mean Cell Volume : 88.0 fL  Mean Cell Hemoglobin : 29.7 pg  Mean Cell Hemoglobin Concentration : 33.7 g/dL  Auto Neutrophil # : x  Auto Lymphocyte # : x  Auto Monocyte # : x  Auto Eosinophil # : x  Auto Basophil # : x  Auto Neutrophil % : x  Auto Lymphocyte % : x  Auto Monocyte % : x  Auto Eosinophil % : x  Auto Basophil % : x    06-09    138  |  103  |  14  ----------------------------<  85  4.1   |  25  |  0.63    Ca    8.1[L]      09 Jun 2025 05:00  Phos  2.4     06-09  Mg     2.00     06-09      Urinalysis Basic - ( 09 Jun 2025 05:00 )    Color: x / Appearance: x / SG: x / pH: x  Gluc: 85 mg/dL / Ketone: x  / Bili: x / Urobili: x   Blood: x / Protein: x / Nitrite: x   Leuk Esterase: x / RBC: x / WBC x   Sq Epi: x / Non Sq Epi: x / Bacteria: x        VITALS  T(C): 37.1 (06-09-25 @ 09:23), Max: 37.1 (06-09-25 @ 01:44)  HR: 84 (06-09-25 @ 09:23) (82 - 93)  BP: 110/60 (06-09-25 @ 09:23) (110/60 - 140/54)  RR: 19 (06-09-25 @ 09:23) (18 - 24)  SpO2: 96% (06-09-25 @ 09:23) (94% - 99%)  Wt(kg): --    ALLERGIES  Dilantin (Urticaria)  penicillins (Urticaria)  erythromycin (Stomach Upset; Vomiting; Nausea)      MEDICATIONS   bacitracin   Ointment 1 Application(s) Topical daily  chlorhexidine 2% Cloths 1 Application(s) Topical daily  cyclobenzaprine 10 milliGRAM(s) Oral two times a day  enoxaparin Injectable 40 milliGRAM(s) SubCutaneous every 24 hours  HYDROmorphone   Tablet 2 milliGRAM(s) Oral every 4 hours PRN  HYDROmorphone   Tablet 4 milliGRAM(s) Oral every 4 hours PRN  HYDROmorphone  Injectable 0.5 milliGRAM(s) IV Push every 4 hours PRN  insulin lispro (ADMELOG) corrective regimen sliding scale   SubCutaneous Before meals and at bedtime  metoprolol tartrate 50 milliGRAM(s) Oral two times a day  naloxone Injectable 0.1 milliGRAM(s) IV Push every 3 minutes PRN  ondansetron Injectable 4 milliGRAM(s) IV Push every 6 hours PRN  pantoprazole  Injectable 40 milliGRAM(s) IV Push daily  polyethylene glycol 3350 17 Gram(s) Oral daily  pramipexole 1.25 milliGRAM(s) Oral three times a day  pregabalin 100 milliGRAM(s) Oral two times a day  senna 2 Tablet(s) Oral at bedtime  sodium chloride 0.9% lock flush 3 milliLiter(s) IV Push every 8 hours      ----------------------------------------------------------------------------------------  PHYSICAL EXAM  Constitutional - NAD, Comfortable, sleepy  Head: NCAT  Chest - no respiratory distress   Cardiovascular - RRR, S1S2   Abdomen -  Soft, NTND  Extremities - b/l UE ecchymosis  Neurologic Exam -                    Cognitive - sleepy, however follows all commands, a & o x 3     Communication - Fluent, No dysarthria     Cranial Nerves - no symmetry      Motor -   moves all extremities at least 3/5     Sensory - Intact to LT       Balance - WNL Static  Psychiatric - Mood stable, Affect WNL  ----------------------------------------------------------------------------------------  ASSESSMENT/PLAN  s/p T4-pelvis post lami, PSF on 6/4  PT for bed mobility, transfers, ambulation as tolerated  out of bed to chair as tolerated   OT for adls  Pain -dilaudid po prn, iv prn (must be off iv prior to rehab)  DVT PPX - lovenox  Rehab -pending progress with PT, patient has previously attended inpatient rehab program at [facility], per conversation with her son she would like to return there.   Will monitor for improvement  On schedule for PT today    Total time spent to review relevant records and imaging results, examine patient, complete documentation, and when applicable discuss the case with the patient, family, , social workers, and medical team:  35 minutes

## 2025-06-09 NOTE — PROGRESS NOTE ADULT - SUBJECTIVE AND OBJECTIVE BOX
PAST 24HR EVENTS: Pt seen and examined at bedside, no acute events, now on floor from SICU. Vitals stable, afebrile. Pending repeat H/H today    Vital Signs Last 24 Hrs  T(C): 37.1 (09 Jun 2025 01:44), Max: 37.1 (09 Jun 2025 01:44)  T(F): 98.8 (09 Jun 2025 01:44), Max: 98.8 (09 Jun 2025 01:44)  HR: 87 (09 Jun 2025 01:44) (82 - 87)  BP: 127/51 (09 Jun 2025 01:44) (127/51 - 140/54)  BP(mean): 85 (08 Jun 2025 16:00) (85 - 85)  RR: 19 (09 Jun 2025 01:44) (15 - 24)  SpO2: 94% (09 Jun 2025 01:44) (94% - 100%)    Parameters below as of 09 Jun 2025 01:44  Patient On (Oxygen Delivery Method): room air        MEDS:   bacitracin   Ointment 1 Application(s) Topical daily  chlorhexidine 2% Cloths 1 Application(s) Topical daily  cyclobenzaprine 10 milliGRAM(s) Oral two times a day  enoxaparin Injectable 40 milliGRAM(s) SubCutaneous every 24 hours  HYDROmorphone   Tablet 2 milliGRAM(s) Oral every 4 hours PRN  HYDROmorphone   Tablet 4 milliGRAM(s) Oral every 4 hours PRN  HYDROmorphone  Injectable 0.5 milliGRAM(s) IV Push every 4 hours PRN  insulin lispro (ADMELOG) corrective regimen sliding scale   SubCutaneous Before meals and at bedtime  metoprolol tartrate 50 milliGRAM(s) Oral two times a day  naloxone Injectable 0.1 milliGRAM(s) IV Push every 3 minutes PRN  ondansetron Injectable 4 milliGRAM(s) IV Push every 6 hours PRN  pantoprazole  Injectable 40 milliGRAM(s) IV Push daily  polyethylene glycol 3350 17 Gram(s) Oral daily  pramipexole 1.25 milliGRAM(s) Oral three times a day  pregabalin 100 milliGRAM(s) Oral two times a day  senna 2 Tablet(s) Oral at bedtime  sodium chloride 0.9% lock flush 3 milliLiter(s) IV Push every 8 hours      LABS:                        8.4    11.16 )-----------( 144      ( 08 Jun 2025 00:50 )             24.1     06-08    138  |  102  |  10  ----------------------------<  100[H]  4.0   |  25  |  0.49[L]    Ca    7.9[L]      08 Jun 2025 00:50  Phos  2.9     06-08  Mg     1.90     06-08          RADIOLOGY:     PHYSICAL EXAM:  AOx3, Following Commands, Face symmetrical  EOMI, PERRL, CN 2-12 intact   Lower half of dressing saturated with serosang drainage, intact    RUE MOTOR:   Delt 5/5  Bicep 5/5  Tricep 5/5      HG 5/5      LUE MOTOR:   Delt 5/5  Bicep 5/5   Tricep 5/5     HG 5/5     RLE MOTOR:   HF 5/5            KF 5/5          KE 5/5         DF 5/5         PF 5/5    EHL 5/5    LLE MOTOR:   HF 5/5        KF 5/5          KE 5/5            DF 5/5            PF 5/5       EHL 5/5      SENSATION: intact to light touch

## 2025-06-10 ENCOUNTER — APPOINTMENT (OUTPATIENT)
Dept: ORTHOPEDIC SURGERY | Facility: CLINIC | Age: 75
End: 2025-06-10

## 2025-06-10 LAB
A1C WITH ESTIMATED AVERAGE GLUCOSE RESULT: 5.2 % — SIGNIFICANT CHANGE UP (ref 4–5.6)
ANION GAP SERPL CALC-SCNC: 11 MMOL/L — SIGNIFICANT CHANGE UP (ref 7–14)
BUN SERPL-MCNC: 19 MG/DL — SIGNIFICANT CHANGE UP (ref 7–23)
CALCIUM SERPL-MCNC: 8 MG/DL — LOW (ref 8.4–10.5)
CHLORIDE SERPL-SCNC: 99 MMOL/L — SIGNIFICANT CHANGE UP (ref 98–107)
CHOLEST SERPL-MCNC: 134 MG/DL — SIGNIFICANT CHANGE UP
CO2 SERPL-SCNC: 23 MMOL/L — SIGNIFICANT CHANGE UP (ref 22–31)
CREAT SERPL-MCNC: 0.74 MG/DL — SIGNIFICANT CHANGE UP (ref 0.5–1.3)
EGFR: 83 ML/MIN/1.73M2 — SIGNIFICANT CHANGE UP
EGFR: 83 ML/MIN/1.73M2 — SIGNIFICANT CHANGE UP
ESTIMATED AVERAGE GLUCOSE: 103 — SIGNIFICANT CHANGE UP
GLUCOSE BLDC GLUCOMTR-MCNC: 105 MG/DL — HIGH (ref 70–99)
GLUCOSE BLDC GLUCOMTR-MCNC: 113 MG/DL — HIGH (ref 70–99)
GLUCOSE BLDC GLUCOMTR-MCNC: 121 MG/DL — HIGH (ref 70–99)
GLUCOSE BLDC GLUCOMTR-MCNC: 130 MG/DL — HIGH (ref 70–99)
GLUCOSE SERPL-MCNC: 102 MG/DL — HIGH (ref 70–99)
HCT VFR BLD CALC: 27.8 % — LOW (ref 34.5–45)
HDLC SERPL-MCNC: 29 MG/DL — LOW
HGB BLD-MCNC: 9.2 G/DL — LOW (ref 11.5–15.5)
LDLC SERPL-MCNC: 69 MG/DL — SIGNIFICANT CHANGE UP
LIPID PNL WITH DIRECT LDL SERPL: 69 MG/DL — SIGNIFICANT CHANGE UP
MAGNESIUM SERPL-MCNC: 1.9 MG/DL — SIGNIFICANT CHANGE UP (ref 1.6–2.6)
MCHC RBC-ENTMCNC: 29.4 PG — SIGNIFICANT CHANGE UP (ref 27–34)
MCHC RBC-ENTMCNC: 33.1 G/DL — SIGNIFICANT CHANGE UP (ref 32–36)
MCV RBC AUTO: 88.8 FL — SIGNIFICANT CHANGE UP (ref 80–100)
NONHDLC SERPL-MCNC: 105 MG/DL — SIGNIFICANT CHANGE UP
NRBC # BLD AUTO: 0.02 K/UL — HIGH (ref 0–0)
NRBC # FLD: 0.02 K/UL — HIGH (ref 0–0)
NRBC BLD AUTO-RTO: 0 /100 WBCS — SIGNIFICANT CHANGE UP (ref 0–0)
PHOSPHATE SERPL-MCNC: 2.4 MG/DL — LOW (ref 2.5–4.5)
PLATELET # BLD AUTO: 207 K/UL — SIGNIFICANT CHANGE UP (ref 150–400)
POTASSIUM SERPL-MCNC: 3.9 MMOL/L — SIGNIFICANT CHANGE UP (ref 3.5–5.3)
POTASSIUM SERPL-SCNC: 3.9 MMOL/L — SIGNIFICANT CHANGE UP (ref 3.5–5.3)
RBC # BLD: 3.13 M/UL — LOW (ref 3.8–5.2)
RBC # FLD: 14.9 % — HIGH (ref 10.3–14.5)
SODIUM SERPL-SCNC: 133 MMOL/L — LOW (ref 135–145)
TRIGL SERPL-MCNC: 215 MG/DL — HIGH
WBC # BLD: 8.25 K/UL — SIGNIFICANT CHANGE UP (ref 3.8–10.5)
WBC # FLD AUTO: 8.25 K/UL — SIGNIFICANT CHANGE UP (ref 3.8–10.5)

## 2025-06-10 PROCEDURE — 99223 1ST HOSP IP/OBS HIGH 75: CPT | Mod: FS

## 2025-06-10 PROCEDURE — 99232 SBSQ HOSP IP/OBS MODERATE 35: CPT

## 2025-06-10 RX ADMIN — METOPROLOL SUCCINATE 50 MILLIGRAM(S): 50 TABLET, EXTENDED RELEASE ORAL at 18:27

## 2025-06-10 RX ADMIN — PRAMIPEXOLE DIHYDROCHLORIDE 1.25 MILLIGRAM(S): 1 TABLET ORAL at 06:29

## 2025-06-10 RX ADMIN — METOPROLOL SUCCINATE 50 MILLIGRAM(S): 50 TABLET, EXTENDED RELEASE ORAL at 06:28

## 2025-06-10 RX ADMIN — CYCLOBENZAPRINE HYDROCHLORIDE 10 MILLIGRAM(S): 15 CAPSULE, EXTENDED RELEASE ORAL at 18:27

## 2025-06-10 RX ADMIN — Medication 1 APPLICATION(S): at 16:02

## 2025-06-10 RX ADMIN — PREGABALIN 100 MILLIGRAM(S): 50 CAPSULE ORAL at 18:27

## 2025-06-10 RX ADMIN — Medication 3 MILLILITER(S): at 21:30

## 2025-06-10 RX ADMIN — Medication 4 MILLIGRAM(S): at 13:09

## 2025-06-10 RX ADMIN — PRAMIPEXOLE DIHYDROCHLORIDE 1.25 MILLIGRAM(S): 1 TABLET ORAL at 21:01

## 2025-06-10 RX ADMIN — PRAMIPEXOLE DIHYDROCHLORIDE 1.25 MILLIGRAM(S): 1 TABLET ORAL at 15:57

## 2025-06-10 RX ADMIN — ENOXAPARIN SODIUM 40 MILLIGRAM(S): 100 INJECTION SUBCUTANEOUS at 21:01

## 2025-06-10 RX ADMIN — PREGABALIN 100 MILLIGRAM(S): 50 CAPSULE ORAL at 06:28

## 2025-06-10 RX ADMIN — Medication 1 APPLICATION(S): at 13:00

## 2025-06-10 RX ADMIN — Medication 3 MILLILITER(S): at 16:07

## 2025-06-10 RX ADMIN — Medication 40 MILLIGRAM(S): at 13:06

## 2025-06-10 RX ADMIN — Medication 3 MILLILITER(S): at 05:48

## 2025-06-10 RX ADMIN — CYCLOBENZAPRINE HYDROCHLORIDE 10 MILLIGRAM(S): 15 CAPSULE, EXTENDED RELEASE ORAL at 06:28

## 2025-06-10 RX ADMIN — Medication 4 MILLIGRAM(S): at 13:39

## 2025-06-10 NOTE — CONSULT NOTE ADULT - ASSESSMENT
76 y/o female PMH of HTN, SILVANA, PAF on Eliquis PE (in 1980s) OA, hiatal hernia (repair 2023) gastroparesis, peripheral neuropathy, SILVANA on CPAP, and extensive prior spinal surgery, admitted s/p T4-Pelvis posterior laminectomy and fusion. Neurology consulted for MRI Brain finding of acute/subacute bilateral occipital infarcts. Patient is unaware she had a stroke and denies any recent changes in vision, weakness, numbness, or speech changes. Patient has a history of paroxysmal afib but was taken off of eliquis in a few months ago since she reported did not have anymore episodes of afib.      mRS: 2  LKN: Unknown  NIHSS: 1    Not a tenecteplase candidate due to out of window.  Not a mechanical thrombectomy candidate due to no LVO.    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     Recommendations:  [] Will need to restart eliquis 5mg BID when medically able  [] TTE w/o bubble   [] Lipid panel, HbA1c  [] Lovenox SQ and SCDs for DVT prophylaxis   [] BP goal: normotensive to 140/80  [] Telemonitoring  [] Stroke neurological checks and vital signs Q4  [] BGM goals 140-180  [] PT/OT; S/S evaluation    Case and plan not final until Attending attestation

## 2025-06-10 NOTE — PROGRESS NOTE ADULT - ASSESSMENT
ASSESSMENT/PLAN:   VIRGINIA HUFFMAN is a 75yFemale s/p PSF with closure 6/4    - will remove low RADHA today  - will continue to follow  - rest of care per primary    Plastic Surgery  [facility]: [id number]  [facility]: [phone]

## 2025-06-10 NOTE — PROGRESS NOTE ADULT - SUBJECTIVE AND OBJECTIVE BOX
PAST 24HR EVENTS: Pt seen and examined at bedside, VSS, afebrile     V/s  T(C): 36.4 (10 Cj 2025 01:38), Max: 37.6 (09 Jun 2025 17:39)  T(F): 97.5 (10 Cj 2025 01:38), Max: 99.6 (09 Jun 2025 17:39)  HR: 77 (10 Cj 2025 01:38) (77 - 103)  BP: 124/60 (10 Cj 2025 01:38) (110/60 - 128/53)  BP(mean): --  ABP: --  ABP(mean): --  RR: 18 (10 Cj 2025 01:38) (17 - 19)  SpO2: 95% (10 Cj 2025 01:38) (95% - 97%)    O2 Parameters below as of 10 Cj 2025 01:38  Patient On (Oxygen Delivery Method): room air      MEDS:   bacitracin   Ointment 1 Application(s) Topical daily  chlorhexidine 2% Cloths 1 Application(s) Topical daily  cyclobenzaprine 10 milliGRAM(s) Oral two times a day  enoxaparin Injectable 40 milliGRAM(s) SubCutaneous every 24 hours  HYDROmorphone   Tablet 2 milliGRAM(s) Oral every 4 hours PRN  HYDROmorphone   Tablet 4 milliGRAM(s) Oral every 4 hours PRN  HYDROmorphone  Injectable 0.5 milliGRAM(s) IV Push every 4 hours PRN  insulin lispro (ADMELOG) corrective regimen sliding scale   SubCutaneous Before meals and at bedtime  metoprolol tartrate 50 milliGRAM(s) Oral two times a day  naloxone Injectable 0.1 milliGRAM(s) IV Push every 3 minutes PRN  ondansetron Injectable 4 milliGRAM(s) IV Push every 6 hours PRN  pantoprazole  Injectable 40 milliGRAM(s) IV Push daily  polyethylene glycol 3350 17 Gram(s) Oral daily  pramipexole 1.25 milliGRAM(s) Oral three times a day  pregabalin 100 milliGRAM(s) Oral two times a day  senna 2 Tablet(s) Oral at bedtime  sodium chloride 0.9% lock flush 3 milliLiter(s) IV Push every 8 hours      LABS:                                        8.9    8.48  )-----------( 173      ( 09 Jun 2025 05:00 )             26.4     06-09    138  |  103  |  14  ----------------------------<  85  4.1   |  25  |  0.63    Ca    8.1[L]      09 Jun 2025 05:00  Phos  2.4     06-09  Mg     2.00     06-09                RADIOLOGY:     PHYSICAL EXAM:  AOx3, Following Commands, Face symmetrical  EOMI, PERRL, CN 2-12 intact     RUE MOTOR:   Delt 5/5  Bicep 5/5  Tricep 5/5      HG 5/5      LUE MOTOR:   Delt 5/5  Bicep 5/5   Tricep 5/5     HG 5/5     RLE MOTOR:   HF 5/5            KF 5/5          KE 5/5         DF 5/5         PF 5/5    EHL 5/5    LLE MOTOR:   HF 5/5        KF 5/5          KE 5/5            DF 5/5            PF 5/5       EHL 5/5      SENSATION: intact to light touch   dressing clean, dry      IMPRESSION:    1. Motion limited study.  2. Acute/subacute bilateral PCA distribution infarcts with associated   cytotoxic edema. No associated hemorrhage.  3. Multiple additional nonspecific abnormal white matter foci of T2/FLAIR   prolongation statistically favoring microvascular type changes.

## 2025-06-10 NOTE — PROGRESS NOTE ADULT - SUBJECTIVE AND OBJECTIVE BOX
Plastic Surgery Progress Note (pg [facility]: [id number], [facility]: [phone])    SUBJECTIVE  NAEO. AVSS. Pt comfortable in bed. Pain well controlled, no complaints.    OBJECTIVE  ___________________________________________________  VITAL SIGNS / I&O's   Vital Signs Last 24 Hrs  T(C): 37.1 (10 Cj 2025 06:24), Max: 37.6 (09 Jun 2025 17:39)  T(F): 98.7 (10 Cj 2025 06:24), Max: 99.6 (09 Jun 2025 17:39)  HR: 86 (10 Cj 2025 06:24) (77 - 103)  BP: 122/56 (10 Cj 2025 06:24) (110/60 - 128/53)  BP(mean): --  RR: 18 (10 Cj 2025 06:24) (17 - 19)  SpO2: 95% (10 Cj 2025 06:24) (95% - 97%)    Parameters below as of 10 Cj 2025 06:24  Patient On (Oxygen Delivery Method): room air          09 Jun 2025 07:01  -  10 Cj 2025 07:00  --------------------------------------------------------  IN:  Total IN: 0 mL    OUT:    Bulb (mL): 5 mL    Bulb (mL): 57.5 mL    Bulb (mL): 110 mL    Voided (mL): 1500 mL  Total OUT: 1672.5 mL    Total NET: -1672.5 mL        ___________________________________________________  PHYSICAL EXAM:   General: NAD, Lying in bed   Neuro: Awake and alert  Back: soft, no collections, incisions intact, jps serosang  ___________________________________________________  LABS                        9.2    8.25  )-----------( 207      ( 10 Cj 2025 06:06 )             27.8     10 Cj 2025 06:06    133    |  99     |  19     ----------------------------<  102    3.9     |  23     |  0.74     Ca    8.0        10 Cj 2025 06:06  Phos  2.4       10 Cj 2025 06:06  Mg     1.90      10 Cj 2025 06:06        CAPILLARY BLOOD GLUCOSE      POCT Blood Glucose.: 105 mg/dL (10 Cj 2025 07:06)  POCT Blood Glucose.: 104 mg/dL (09 Jun 2025 21:59)  POCT Blood Glucose.: 81 mg/dL (09 Jun 2025 16:34)  POCT Blood Glucose.: 175 mg/dL (09 Jun 2025 11:22)        Urinalysis Basic - ( 10 Cj 2025 06:06 )    Color: x / Appearance: x / SG: x / pH: x  Gluc: 102 mg/dL / Ketone: x  / Bili: x / Urobili: x   Blood: x / Protein: x / Nitrite: x   Leuk Esterase: x / RBC: x / WBC x   Sq Epi: x / Non Sq Epi: x / Bacteria: x      ___________________________________________________  MICRO  Recent Cultures:    ___________________________________________________  MEDICATIONS  (STANDING):  bacitracin   Ointment 1 Application(s) Topical daily  chlorhexidine 2% Cloths 1 Application(s) Topical daily  cyclobenzaprine 10 milliGRAM(s) Oral two times a day  enoxaparin Injectable 40 milliGRAM(s) SubCutaneous every 24 hours  insulin lispro (ADMELOG) corrective regimen sliding scale   SubCutaneous Before meals and at bedtime  metoprolol tartrate 50 milliGRAM(s) Oral two times a day  pantoprazole  Injectable 40 milliGRAM(s) IV Push daily  polyethylene glycol 3350 17 Gram(s) Oral daily  pramipexole 1.25 milliGRAM(s) Oral three times a day  pregabalin 100 milliGRAM(s) Oral two times a day  senna 2 Tablet(s) Oral at bedtime  sodium chloride 0.9% lock flush 3 milliLiter(s) IV Push every 8 hours    MEDICATIONS  (PRN):  HYDROmorphone   Tablet 2 milliGRAM(s) Oral every 4 hours PRN Moderate Pain (4 - 6)  HYDROmorphone   Tablet 4 milliGRAM(s) Oral every 4 hours PRN Severe Pain (7 - 10)  HYDROmorphone  Injectable 0.5 milliGRAM(s) IV Push every 4 hours PRN breakthrough  naloxone Injectable 0.1 milliGRAM(s) IV Push every 3 minutes PRN For ANY of the following changes in patient status:  A. RR LESS THAN 10 breaths per minute, B. Oxygen saturation LESS THAN 90%, C. Sedation score of 6  ondansetron Injectable 4 milliGRAM(s) IV Push every 6 hours PRN Nausea

## 2025-06-10 NOTE — PROGRESS NOTE ADULT - SUBJECTIVE AND OBJECTIVE BOX
Patient is a 75y old  Female who presents with a chief complaint of SDA (06 Jun 2025 14:36)      HPI:  74 y/o female PMH of HTN, SILVANA, PAF on Eliquis PE (in 1980s) OA, hiatal hernia (repair 2023) gastroparesis, peripheral neuropathy, SILVANA on CPAP, and extensive prior spinal surgery most recently receiving thoracic lumbar fusion with cage with Dr. Stroud on 9/24, after a fall, complicated by sepsis, and paroxysmal atrial fibrillation, development of pleural effusions, symptomatic orthostatic hypotension, urinary retention, post op anemia, chest pain with spontaneous resolution, and paraspinal abscess s/p paraspinal collection aspiration on 10/25/24 presents to presurgical testing with diagnosis of unspecified fracture thoracic vertebrae, scoliosis, and postlaminectomy syndrome. Pt twisted her back a few weeks ago resulting in vertebral fracture. Pt is scheduled for T4-Pelvis posterior laminectomy and fusion with posterior instrumentation. Possible corpectomy T9. Lumbar osteotomy and illiac fixation.       (22 May 2025 09:58)    denies pain at rest, ambulated to commode with assistance today. Seen by PT yesterday. Son at bedside.    REVIEW OF SYSTEMS  weakness    PAST MEDICAL & SURGICAL HISTORY  H/O seasonal allergies    History of pulmonary embolism    Restless leg syndrome    GERD (gastroesophageal reflux disease)    HTN (hypertension)    OA (osteoarthritis)    Fungal infection of skin    H/O scoliosis    Essential hypertension    Depression    Osteoporosis    Right hip pain    2019 novel coronavirus disease (COVID-19)    Hiatal hernia    History of chronic pain    Peripheral neuropathy    Chronic constipation    Chronic GERD    Closed fracture of thoracic vertebra    MSSA (methicillin susceptible Staphylococcus aureus) infection    SILVANA on CPAP    Paroxysmal atrial fibrillation    PFO (patent foramen ovale)    Hearing loss    Postlaminectomy syndrome    Delayed gastric emptying    Anemia    DM2 (diabetes mellitus, type 2)    S/P repair of paraesophageal hernia    S/P spinal fusion    Scoliosis    S/P spinal surgery    Removal of pin, plate, abigail, or screw    S/P hysterectomy    S/P hip replacement    S/P shoulder surgery    S/P dilatation and curettage    H/O arthroscopy of knee    H/O total knee replacement, right    S/P cataract surgery    History of bilateral hip replacements    History of repair of hiatal hernia       CURRENT FUNCTIONAL STATUS  6/10      Bed Mobility  Bed Mobility Training Rehab Potential: good, to achieve stated therapy goals  Bed Mobility Training Symptoms Noted During/After Treatment: none  Bed Mobility Training Rolling/Turning: moderate assist (50% patient effort);  bed rails;  1 person assist;  verbal cues  Bed Mobility Training Scooting: moderate assist (50% patient effort);  1 person assist;  verbal cues  Bed Mobility Training Sit-to-Supine: maximum assist (25% patient effort);  2 person assist;  verbal cues  Bed Mobility Training Supine-to-Sit: moderate assist (50% patient effort);  2 person assist;  bed rails  Bed Mobility Training Limitations: decreased ROM;  decreased strength;  impaired postural control;  pain    Sit-Stand Transfer Training  Sit-to-Stand Transfer Training Rehab Potential: good, to achieve stated therapy goals  Sit-to-Stand Transfer Training Symptoms Noted During/After Treatment: increased pain  Transfer Training Sit-to-Stand Transfer: moderate assist (50% patient effort);  2 person assist;  full weight-bearing   rolling walker  Transfer Training Stand-to-Sit Transfer: moderate assist (50% patient effort);  2 person assist;  full weight-bearing   rolling walker  Sit-to-Stand Transfer Training Transfer Safety Analysis: decreased balance;  impaired motor control;  pain;  decreased strength;  rolling walker    Toilet Transfer Training  Toilet Transfer Training Rehab Potential: good, to achieve stated therapy goals  Toilet Transfer Training Symptoms Noted During/After Treatment: none;  increased pain  Transfer Training Toilet Transfer: moderate assist (50% patient effort);  2 person assist;  full weight-bearing   rolling walker  Toilet Transfer Training Transfer Safety Analysis: decreased balance;  decreased strength;  decreased ROM;  pain;  rolling walker          FAMILY HISTORY   Family history of abdominal aortic aneurysm (AAA) (Mother)        RECENT LABS/IMAGING  CBC Full  -  ( 10 Cj 2025 06:06 )  WBC Count : 8.25 K/uL  RBC Count : 3.13 M/uL  Hemoglobin : 9.2 g/dL  Hematocrit : 27.8 %  Platelet Count - Automated : 207 K/uL  Mean Cell Volume : 88.8 fL  Mean Cell Hemoglobin : 29.4 pg  Mean Cell Hemoglobin Concentration : 33.1 g/dL  Auto Neutrophil # : x  Auto Lymphocyte # : x  Auto Monocyte # : x  Auto Eosinophil # : x  Auto Basophil # : x  Auto Neutrophil % : x  Auto Lymphocyte % : x  Auto Monocyte % : x  Auto Eosinophil % : x  Auto Basophil % : x    06-10    133[L]  |  99  |  19  ----------------------------<  102[H]  3.9   |  23  |  0.74    Ca    8.0[L]      10 Cj 2025 06:06  Phos  2.4     06-10  Mg     1.90     06-10      Urinalysis Basic - ( 10 Cj 2025 06:06 )    Color: x / Appearance: x / SG: x / pH: x  Gluc: 102 mg/dL / Ketone: x  / Bili: x / Urobili: x   Blood: x / Protein: x / Nitrite: x   Leuk Esterase: x / RBC: x / WBC x   Sq Epi: x / Non Sq Epi: x / Bacteria: x        VITALS  T(C): 37.1 (06-10-25 @ 12:20), Max: 37.6 (06-09-25 @ 17:39)  HR: 95 (06-10-25 @ 12:20) (77 - 103)  BP: 113/66 (06-10-25 @ 12:20) (112/54 - 126/66)  RR: 18 (06-10-25 @ 12:20) (17 - 18)  SpO2: 95% (06-10-25 @ 12:20) (95% - 98%)  Wt(kg): --    ALLERGIES  Dilantin (Urticaria)  penicillins (Urticaria)  erythromycin (Stomach Upset; Vomiting; Nausea)      MEDICATIONS   bacitracin   Ointment 1 Application(s) Topical daily  chlorhexidine 2% Cloths 1 Application(s) Topical daily  cyclobenzaprine 10 milliGRAM(s) Oral two times a day  enoxaparin Injectable 40 milliGRAM(s) SubCutaneous every 24 hours  HYDROmorphone   Tablet 4 milliGRAM(s) Oral every 4 hours PRN  HYDROmorphone   Tablet 2 milliGRAM(s) Oral every 4 hours PRN  HYDROmorphone  Injectable 0.5 milliGRAM(s) IV Push every 4 hours PRN  insulin lispro (ADMELOG) corrective regimen sliding scale   SubCutaneous Before meals and at bedtime  metoprolol tartrate 50 milliGRAM(s) Oral two times a day  naloxone Injectable 0.1 milliGRAM(s) IV Push every 3 minutes PRN  ondansetron Injectable 4 milliGRAM(s) IV Push every 6 hours PRN  pantoprazole  Injectable 40 milliGRAM(s) IV Push daily  polyethylene glycol 3350 17 Gram(s) Oral daily  pramipexole 1.25 milliGRAM(s) Oral three times a day  pregabalin 100 milliGRAM(s) Oral two times a day  senna 2 Tablet(s) Oral at bedtime  sodium chloride 0.9% lock flush 3 milliLiter(s) IV Push every 8 hours      ----------------------------------------------------------------------------------------    PHYSICAL EXAM  Constitutional - NAD, Comfortable   Head: NCAT  Chest - no respiratory distress   Cardiovascular - RRR, S1S2   Abdomen -  Soft, NTND  Extremities - b/l UE ecchymosis  Neurologic Exam -                    Cognitive - oriented x 2     Communication - Fluent, No dysarthria     Cranial Nerves - no symmetry      Motor -   upper extremities 4/5, b/l LE 2/5     Sensory - reports tingling b/l R >L     Balance - WNL Static  Psychiatric - Mood stable, Affect WNL  ----------------------------------------------------------------------------------------  ASSESSMENT/PLAN  74 yo f s/p T4-pelvis post lami, PSF on 6/4  seen by neurology for occipital cva seen on mri, patient denies vision changes  PT for bed mobility, transfers, ambulation as tolerated  out of bed to chair as tolerated   OT for adls  Pain -dilaudid po prn, iv prn (must be off iv prior to rehab)  DVT PPX - lovenox  Rehab recommend acute inpatient rehab when medically cleared. Patient can tolerate 3 hours per day of therapy with medical supervision    Total time spent to review relevant records and imaging results, examine patient, complete documentation, and when applicable discuss the case with the patient, family, , social workers, and medical team:  35 minutes

## 2025-06-10 NOTE — PROGRESS NOTE ADULT - PROBLEM SELECTOR PLAN 1
- Neurochecks q4  - TLSO when OOB  - Daily CBC/BMP  - Standing XR when able  - drains per plastics  - Incentive spirometry  - DVT ppx   - Bowel regimen      Pager [phone]   Case discussed with attending neurosurgeon Dr. Stroud

## 2025-06-10 NOTE — PROGRESS NOTE ADULT - ASSESSMENT
74 y/o female PMH of HTN, SILVANA, PAF on Eliquis PE (in 1980s) OA, hiatal hernia (repair 2023) gastroparesis, peripheral neuropathy, SILVANA on CPAP, and extensive prior spinal surgery (last in 09/2024-T9 partial corpectomy, had paraspinal abscess drained in 10/2024) presented as SDA for T3 to pelvis fusion and instrumention with L4 PSO on 6/4/25.     6/4 OR for T3 to pelvis fusion and instrumention with L4 PSO, with Dr. Stroud (neurosurgery), Dr. Gray (ortho), and Dr. Bolton (plastics), closed with nylons. 4uPRBCs given intraop, has tim bui, drains per plastics. Low pressures at end of case, pt required increasing phenylephrine intraop. 500 cc LR bolus given for increasing vasopressor requirements and elevated lactate.     6/5 POD 1, intubated and sedated (partially weaned for exam), MAEx4 spont under wrist restraints, incision c/d/i. On ancef 24h postop. Postop Hg 11.0, ramya /70.  6/7 POD 3,  H/H 8.2/23.4, trending cbc every 12h as long as patient is hemodynamically stable, hypokalemic this morning to 2.9 given potassium chloride tablets for repletion. On exam, Ms. French is more alert to person and place.   6/8 POD 4, H/H stable, exam improving. Deferring MRI for now as exam is improving and less concern for stroke. Daily H/H to decrease blood draws.   6/9 POD 5, Pending repeat H/H, now on floor, lower half of dressing saturated so dressing replaced.   6/10: pod 6, H&H stable, mri brain  Acute/subacute bilateral PCA distribution infarcts with associated, neurology called, drains per plastics

## 2025-06-10 NOTE — CONSULT NOTE ADULT - SUBJECTIVE AND OBJECTIVE BOX
Neurology - Consult Note    Spectra: [phone] ([facility]), [phone] ([facility])    HPI: 74 y/o female PMH of HTN, SILVANA, PAF on Eliquis PE (in 1980s) OA, hiatal hernia (repair 2023) gastroparesis, peripheral neuropathy, SILVANA on CPAP, and extensive prior spinal surgery, admitted s/p T4-Pelvis posterior laminectomy and fusion. Neurology consulted for MRI Brain finding of acute/subacute bilateral occipital infarcts. Patient is unaware she had a stroke and denies any recent changes in vision, weakness, numbness, or speech changes. Patient has a history of paroxysmal afib but was taken off of eliquis in a few months ago since she reported did not have anymore episodes of afib.       Review of Systems:   CONSTITUTIONAL: No fevers or chills  EYES AND ENT: No visual changes or no throat pain   NECK: No pain or stiffness  RESPIRATORY: No shortness of breath  CARDIOVASCULAR: No chest pain or palpitations  GASTROINTESTINAL: No nausea or vomiting   GENITOURINARY: No dysuria  NEUROLOGICAL: +As stated in HPI above  SKIN: No itching, burning, rashes, or lesions   All other review of systems is negative unless indicated above.    Allergies:  Dilantin (Urticaria)  penicillins (Urticaria)  erythromycin (Stomach Upset; Vomiting; Nausea)      PMHx/PSHx/Family Hx: As above, otherwise see below   H/O seasonal allergies    History of pulmonary embolism    Restless leg syndrome    GERD (gastroesophageal reflux disease)    HTN (hypertension)    OA (osteoarthritis)    Fungal infection of skin    H/O scoliosis    Essential hypertension    Depression    Osteoporosis    Right hip pain    2019 novel coronavirus disease (COVID-19)    Hiatal hernia    History of chronic pain    Peripheral neuropathy    Chronic constipation    Chronic GERD    Closed fracture of thoracic vertebra    MSSA (methicillin susceptible Staphylococcus aureus) infection    SILVANA on CPAP    Paroxysmal atrial fibrillation    PFO (patent foramen ovale)    Hearing loss    Postlaminectomy syndrome    Delayed gastric emptying    Anemia    DM2 (diabetes mellitus, type 2)        Social Hx:  Never smoker; no current use of tobacco, alcohol, or illicit drugs    Medications:  MEDICATIONS  (STANDING):  bacitracin   Ointment 1 Application(s) Topical daily  chlorhexidine 2% Cloths 1 Application(s) Topical daily  cyclobenzaprine 10 milliGRAM(s) Oral two times a day  enoxaparin Injectable 40 milliGRAM(s) SubCutaneous every 24 hours  insulin lispro (ADMELOG) corrective regimen sliding scale   SubCutaneous Before meals and at bedtime  metoprolol tartrate 50 milliGRAM(s) Oral two times a day  pantoprazole  Injectable 40 milliGRAM(s) IV Push daily  polyethylene glycol 3350 17 Gram(s) Oral daily  pramipexole 1.25 milliGRAM(s) Oral three times a day  pregabalin 100 milliGRAM(s) Oral two times a day  senna 2 Tablet(s) Oral at bedtime  sodium chloride 0.9% lock flush 3 milliLiter(s) IV Push every 8 hours    MEDICATIONS  (PRN):  HYDROmorphone   Tablet 2 milliGRAM(s) Oral every 4 hours PRN Moderate Pain (4 - 6)  HYDROmorphone   Tablet 4 milliGRAM(s) Oral every 4 hours PRN Severe Pain (7 - 10)  HYDROmorphone  Injectable 0.5 milliGRAM(s) IV Push every 4 hours PRN breakthrough  naloxone Injectable 0.1 milliGRAM(s) IV Push every 3 minutes PRN For ANY of the following changes in patient status:  A. RR LESS THAN 10 breaths per minute, B. Oxygen saturation LESS THAN 90%, C. Sedation score of 6  ondansetron Injectable 4 milliGRAM(s) IV Push every 6 hours PRN Nausea      Vitals:  T(C): 36.4 (06-10-25 @ 01:38), Max: 37.6 (06-09-25 @ 17:39)  HR: 77 (06-10-25 @ 01:38) (77 - 103)  BP: 124/60 (06-10-25 @ 01:38) (110/60 - 128/53)  RR: 18 (06-10-25 @ 01:38) (17 - 19)  SpO2: 95% (06-10-25 @ 01:38) (95% - 97%)    Physical Examination:  General - non-toxic appearing, in no acute distress  Cardiovascular - peripheral pulses palpable, no edema  Respiratory - breathing comfortably with no increased work of breathing    Neurologic Exam:  Mental status - Awake, Alert, Oriented to person, place, knows year but not month or date. Speech fluent, repetition and naming intact. Follows simple and complex commands. Attention/concentration intact    Cranial nerves - PERRLA (4mm -> 3mm b/l), right inferior quandrantanopia, EOMI, face sensation (V1-V3) intact b/l, facial strength intact without asymmetry b/l, hearing intact b/l, palate with symmetric elevation, trapezius 5/5 strength b/l, tongue midline on protrusion with full lateral movement    Motor - Normal bulk and tone throughout. No pronator drift.    Strength testing                              Right           Left  Should-Abduct       5                   5  Elbow-Flex             5                   5  Elbow-Ext              5                   5  Wrist-Flex              5                   5  Wrist-Ext               5                   5  Interossei              5                   5                        5                   5    Hip-Flex                 5                   5  Hip-Ext                  5                   5  Knee-Flex              5                   5  Knee-Ext                5                   5  Dorsiflex                5                   5  Plantarflex             5                   5    Sensation - Light touch intact throughout    Reflexes: Deferred for focused exam    Coordination - Finger to Nose intact b/l. HKS intact bilaterally. No tremors appreciated    Gait and station - Not tested for patient safety    Labs:                        8.9    8.48  )-----------( 173      ( 09 Jun 2025 05:00 )             26.4     06-09    138  |  103  |  14  ----------------------------<  85  4.1   |  25  |  0.63    Ca    8.1[L]      09 Jun 2025 05:00  Phos  2.4     06-09  Mg     2.00     06-09      CAPILLARY BLOOD GLUCOSE      POCT Blood Glucose.: 104 mg/dL (09 Jun 2025 21:59)          CSF:                  Radiology:    MRI Brain:  1. Motion limited study.  2. Acute/subacute bilateral PCA distribution infarcts with associated   cytotoxic edema. No associated hemorrhage.  3. Multiple additional nonspecific abnormal white matter foci of T2/FLAIR   prolongation statistically favoring microvascular type changes.    CT HEAD:  No acute intracranial hemorrhage, mass effect, or midline shift.    CTA NECK:  No evidence of significant stenosis or occlusion.    CTA HEAD:  No large vessel occlusion, significant stenosis or vascular abnormality   identified.

## 2025-06-11 DIAGNOSIS — R63.4 ABNORMAL WEIGHT LOSS: ICD-10-CM

## 2025-06-11 DIAGNOSIS — Z29.9 ENCOUNTER FOR PROPHYLACTIC MEASURES, UNSPECIFIED: ICD-10-CM

## 2025-06-11 DIAGNOSIS — I48.0 PAROXYSMAL ATRIAL FIBRILLATION: ICD-10-CM

## 2025-06-11 DIAGNOSIS — Z98.1 ARTHRODESIS STATUS: ICD-10-CM

## 2025-06-11 DIAGNOSIS — K21.9 GASTRO-ESOPHAGEAL REFLUX DISEASE WITHOUT ESOPHAGITIS: ICD-10-CM

## 2025-06-11 DIAGNOSIS — G25.81 RESTLESS LEGS SYNDROME: ICD-10-CM

## 2025-06-11 DIAGNOSIS — E78.5 HYPERLIPIDEMIA, UNSPECIFIED: ICD-10-CM

## 2025-06-11 DIAGNOSIS — R41.0 DISORIENTATION, UNSPECIFIED: ICD-10-CM

## 2025-06-11 DIAGNOSIS — I63.9 CEREBRAL INFARCTION, UNSPECIFIED: ICD-10-CM

## 2025-06-11 DIAGNOSIS — D62 ACUTE POSTHEMORRHAGIC ANEMIA: ICD-10-CM

## 2025-06-11 LAB
ANION GAP SERPL CALC-SCNC: 14 MMOL/L — SIGNIFICANT CHANGE UP (ref 7–14)
APTT BLD: 29.5 SEC — SIGNIFICANT CHANGE UP (ref 26.1–36.8)
BLD GP AB SCN SERPL QL: NEGATIVE — SIGNIFICANT CHANGE UP
BUN SERPL-MCNC: 22 MG/DL — SIGNIFICANT CHANGE UP (ref 7–23)
CALCIUM SERPL-MCNC: 8.3 MG/DL — LOW (ref 8.4–10.5)
CHLORIDE SERPL-SCNC: 99 MMOL/L — SIGNIFICANT CHANGE UP (ref 98–107)
CO2 SERPL-SCNC: 20 MMOL/L — LOW (ref 22–31)
CREAT SERPL-MCNC: 0.88 MG/DL — SIGNIFICANT CHANGE UP (ref 0.5–1.3)
EGFR: 68 ML/MIN/1.73M2 — SIGNIFICANT CHANGE UP
EGFR: 68 ML/MIN/1.73M2 — SIGNIFICANT CHANGE UP
GAS PNL BLDV: SIGNIFICANT CHANGE UP
GLUCOSE BLDC GLUCOMTR-MCNC: 108 MG/DL — HIGH (ref 70–99)
GLUCOSE BLDC GLUCOMTR-MCNC: 127 MG/DL — HIGH (ref 70–99)
GLUCOSE BLDC GLUCOMTR-MCNC: 130 MG/DL — HIGH (ref 70–99)
GLUCOSE BLDC GLUCOMTR-MCNC: 140 MG/DL — HIGH (ref 70–99)
GLUCOSE BLDC GLUCOMTR-MCNC: 141 MG/DL — HIGH (ref 70–99)
GLUCOSE SERPL-MCNC: 128 MG/DL — HIGH (ref 70–99)
HCT VFR BLD CALC: 31.9 % — LOW (ref 34.5–45)
HGB BLD-MCNC: 10.2 G/DL — LOW (ref 11.5–15.5)
INR BLD: 1.04 RATIO — SIGNIFICANT CHANGE UP (ref 0.85–1.16)
MCHC RBC-ENTMCNC: 29.5 PG — SIGNIFICANT CHANGE UP (ref 27–34)
MCHC RBC-ENTMCNC: 32 G/DL — SIGNIFICANT CHANGE UP (ref 32–36)
MCV RBC AUTO: 92.2 FL — SIGNIFICANT CHANGE UP (ref 80–100)
NRBC # BLD AUTO: 0.03 K/UL — HIGH (ref 0–0)
NRBC # FLD: 0.03 K/UL — HIGH (ref 0–0)
NRBC BLD AUTO-RTO: 0 /100 WBCS — SIGNIFICANT CHANGE UP (ref 0–0)
PLATELET # BLD AUTO: 277 K/UL — SIGNIFICANT CHANGE UP (ref 150–400)
POTASSIUM SERPL-MCNC: 4.5 MMOL/L — SIGNIFICANT CHANGE UP (ref 3.5–5.3)
POTASSIUM SERPL-SCNC: 4.5 MMOL/L — SIGNIFICANT CHANGE UP (ref 3.5–5.3)
PROTHROM AB SERPL-ACNC: 12.1 SEC — SIGNIFICANT CHANGE UP (ref 9.9–13.4)
RBC # BLD: 3.46 M/UL — LOW (ref 3.8–5.2)
RBC # FLD: 14.7 % — HIGH (ref 10.3–14.5)
RH IG SCN BLD-IMP: NEGATIVE — SIGNIFICANT CHANGE UP
SODIUM SERPL-SCNC: 133 MMOL/L — LOW (ref 135–145)
SURGICAL PATHOLOGY STUDY: SIGNIFICANT CHANGE UP
WBC # BLD: 11.09 K/UL — HIGH (ref 3.8–10.5)
WBC # FLD AUTO: 11.09 K/UL — HIGH (ref 3.8–10.5)

## 2025-06-11 PROCEDURE — 99233 SBSQ HOSP IP/OBS HIGH 50: CPT | Mod: FS

## 2025-06-11 PROCEDURE — 70450 CT HEAD/BRAIN W/O DYE: CPT | Mod: 26,59,77

## 2025-06-11 PROCEDURE — 99232 SBSQ HOSP IP/OBS MODERATE 35: CPT

## 2025-06-11 PROCEDURE — 99223 1ST HOSP IP/OBS HIGH 75: CPT

## 2025-06-11 RX ORDER — ACETAMINOPHEN 500 MG/5ML
650 LIQUID (ML) ORAL EVERY 6 HOURS
Refills: 0 | Status: DISCONTINUED | OUTPATIENT
Start: 2025-06-11 | End: 2025-06-17

## 2025-06-11 RX ORDER — APIXABAN 2.5 MG/1
5 TABLET, FILM COATED ORAL EVERY 12 HOURS
Refills: 0 | Status: DISCONTINUED | OUTPATIENT
Start: 2025-06-11 | End: 2025-06-17

## 2025-06-11 RX ORDER — BACITRACIN 500 UNIT/G
1 OINTMENT (GRAM) TOPICAL ONCE
Refills: 0 | Status: COMPLETED | OUTPATIENT
Start: 2025-06-11 | End: 2025-06-11

## 2025-06-11 RX ORDER — ATORVASTATIN CALCIUM 80 MG/1
40 TABLET, FILM COATED ORAL AT BEDTIME
Refills: 0 | Status: DISCONTINUED | OUTPATIENT
Start: 2025-06-11 | End: 2025-06-17

## 2025-06-11 RX ORDER — OXYCODONE HYDROCHLORIDE 30 MG/1
10 TABLET ORAL EVERY 6 HOURS
Refills: 0 | Status: DISCONTINUED | OUTPATIENT
Start: 2025-06-11 | End: 2025-06-17

## 2025-06-11 RX ORDER — OXYCODONE HYDROCHLORIDE 30 MG/1
5 TABLET ORAL EVERY 6 HOURS
Refills: 0 | Status: DISCONTINUED | OUTPATIENT
Start: 2025-06-11 | End: 2025-06-17

## 2025-06-11 RX ORDER — METOPROLOL SUCCINATE 50 MG/1
100 TABLET, EXTENDED RELEASE ORAL
Refills: 0 | Status: DISCONTINUED | OUTPATIENT
Start: 2025-06-11 | End: 2025-06-17

## 2025-06-11 RX ADMIN — CYCLOBENZAPRINE HYDROCHLORIDE 10 MILLIGRAM(S): 15 CAPSULE, EXTENDED RELEASE ORAL at 17:27

## 2025-06-11 RX ADMIN — Medication 3 MILLILITER(S): at 22:38

## 2025-06-11 RX ADMIN — Medication 1 APPLICATION(S): at 20:23

## 2025-06-11 RX ADMIN — METOPROLOL SUCCINATE 50 MILLIGRAM(S): 50 TABLET, EXTENDED RELEASE ORAL at 05:39

## 2025-06-11 RX ADMIN — ATORVASTATIN CALCIUM 40 MILLIGRAM(S): 80 TABLET, FILM COATED ORAL at 21:28

## 2025-06-11 RX ADMIN — PRAMIPEXOLE DIHYDROCHLORIDE 1.25 MILLIGRAM(S): 1 TABLET ORAL at 21:26

## 2025-06-11 RX ADMIN — PREGABALIN 100 MILLIGRAM(S): 50 CAPSULE ORAL at 05:46

## 2025-06-11 RX ADMIN — Medication 1 APPLICATION(S): at 13:56

## 2025-06-11 RX ADMIN — METOPROLOL SUCCINATE 100 MILLIGRAM(S): 50 TABLET, EXTENDED RELEASE ORAL at 18:47

## 2025-06-11 RX ADMIN — PREGABALIN 100 MILLIGRAM(S): 50 CAPSULE ORAL at 17:29

## 2025-06-11 RX ADMIN — APIXABAN 5 MILLIGRAM(S): 2.5 TABLET, FILM COATED ORAL at 17:26

## 2025-06-11 RX ADMIN — PRAMIPEXOLE DIHYDROCHLORIDE 1.25 MILLIGRAM(S): 1 TABLET ORAL at 05:39

## 2025-06-11 RX ADMIN — Medication 40 MILLIGRAM(S): at 13:55

## 2025-06-11 RX ADMIN — PRAMIPEXOLE DIHYDROCHLORIDE 1.25 MILLIGRAM(S): 1 TABLET ORAL at 13:59

## 2025-06-11 RX ADMIN — CYCLOBENZAPRINE HYDROCHLORIDE 10 MILLIGRAM(S): 15 CAPSULE, EXTENDED RELEASE ORAL at 05:39

## 2025-06-11 NOTE — CONSULT NOTE ADULT - PROBLEM SELECTOR RECOMMENDATION 9
- pramipexole - 6/4 s/p T3-pelvis with L4 pedicle osteotomy and T3-pelvis fusion w/ Myocutaneous flap closure with plastic surgery.   - on chronic suppression as outpt cefadroxil management as per primary   - pain control as per primary with bowel regimen   - wound care as per primary - out pt MRI subacute moderate anterior wedge compression fracture of the T9 vertebral body w/ T8-T9 there is moderate to severe bilateral neural foraminal narrowing and flattening of the ventral thecal sac with contacting the cord which is progressed.   - 6/4 s/p T3-pelvis with L4 pedicle osteotomy and T3-pelvis fusion w/ Myocutaneous flap closure with plastic surgery.   - on chronic suppression as outpt cefadroxil management as per primary   - pain control as per primary with bowel regimen   - wound care as per primary

## 2025-06-11 NOTE — CONSULT NOTE ADULT - PROBLEM SELECTOR RECOMMENDATION 3
- EBL >1L  - s/p 8 units prbc last transfusion 6/6 ; 1 u cryo1 u FFP and 1 U platelets  - monitor CBC

## 2025-06-11 NOTE — CHART NOTE - NSCHARTNOTEFT_GEN_A_CORE
Patient seen and examined today at bedside with Stroke neurology team and fellow.  Patient noted to have change in exam with worsening RLE weakness (3/5), also noted to have confabulation and perseveration and paraphasic errors on exam. Although patient makes eye contact with examiner, unable to reliably answer correctly when testing visual fields.    [] Please obtain STAT CT head w/o contrast (new neurological finding of RLE paresis) - communicated with primary team  [] Can obtain MRI brain w/o contrast as well  [] Please obtain 2 hour rEEG  [] Resume AC (apixaban) when able. In the interim, consider starting aspirin for stroke prevention if safe from neurosurgical standpoint and discontinue with apixaban restarted.  [] Avoid hypotension. SBP goal 100 - 160 mmHg.  [] Start maintenance IV fluids Patient seen and examined today at bedside with Stroke neurology team and fellow.  Patient noted to have change in exam with worsening RLE weakness (3/5), also noted to have confabulation and perseveration and paraphasic errors on exam. Although patient makes eye contact with examiner, unable to reliably answer correctly when testing visual fields and does not blink to threat bilaterally.    [] Please obtain STAT CT head w/o contrast (new neurological finding of RLE paresis) - communicated with primary team  [] Can obtain MRI brain w/o contrast as well  [] Please obtain 2 hour rEEG  [] Resume AC (apixaban) when able. In the interim, consider starting aspirin for stroke prevention if safe from neurosurgical standpoint and discontinue when apixaban restarted.  [] Avoid hypotension. SBP goal 100 - 160 mmHg.  [] Start maintenance IV fluids Patient seen and examined today at bedside with stroke neurology team and fellow.  Patient noted to have change in exam with worsening RLE weakness (3/5).    Mental status: eyes open, attends to examiner, not oriented to month ("February") or age ("45"), dysfluent speech w/ phonemic paraphasic errors, follows most simple commands.  Cranial nerves: makes eye contact with examiner, correctly counted providers in the room, confabulated with finger counting directly in her central field of vision, did not blink to threat bilaterally, difficulty with smooth pursuit but otherwise no obvious EOM paresis, face grossly symmetrical, intact sensation throughout b/l V1-V3.  Motor: LUE w/o drift, RUE w/ mild drift, LLE w/ mild drift (may be due to not following command), RLE falls to bed w/o effort against gravity.  Sensation: limited evaluation due to language deficit.  Coordination/gait: not assessed.     [] Please obtain STAT CT head w/o contrast (new neurological finding of RLE paresis) - communicated with primary team.  [] Can obtain MRI brain w/o contrast as well.  [] Please obtain 2 hour rEEG.  [] Resume AC (apixaban) when able. In the interim, consider starting aspirin for stroke prevention if safe from neurosurgical standpoint and discontinue when apixaban restarted.  [] Avoid hypotension. SBP goal 100 - 160 mmHg.  [] Start maintenance IV fluids. Patient seen and examined today at bedside with stroke neurology team and fellow.  Patient noted to have change in exam with worsening RLE weakness (3/5).    Mental status: eyes open, attends to examiner, not oriented to month ("February") or age ("45"), dysfluent speech w/ phonemic paraphasic errors, follows most simple commands.  Cranial nerves: makes eye contact with examiner, correctly counted providers in the room, confabulated with finger counting directly in her central field of vision, did not blink to threat bilaterally, difficulty with smooth pursuit but otherwise no obvious EOM paresis, face grossly symmetrical, intact sensation throughout b/l V1-V3.  Motor: LUE w/o drift, RUE w/ mild drift, LLE w/ mild drift (may be due to not following command), RLE falls to bed w/o effort against gravity.  Sensation: limited evaluation due to language deficit.  Coordination/gait: not assessed.     Impression: Interval development of expressive > receptive aphasia with right hemiparesis, localizable to left brain dysfunction, concerning for left MCA territory acute ischemic infarct.    [] Please obtain STAT CT head w/o contrast (new neurological finding of RLE paresis) - communicated with primary team.  [] Can obtain MRI brain w/o contrast as well - unlikely to   [] Please obtain 2 hour rEEG.  [] Resume AC (apixaban) when able. In the interim, consider starting aspirin for stroke prevention if safe from neurosurgical standpoint and discontinue when apixaban restarted.  [] Avoid hypotension. SBP goal 100 - 160 mmHg.  [] Start maintenance IV fluids. Patient seen and examined today at bedside with stroke neurology team and fellow.  Patient noted to have change in exam with worsening RLE weakness (3/5).    Mental status: eyes open, attends to examiner, not oriented to month ("February") or age ("45"), dysfluent speech w/ phonemic paraphasic errors, follows most simple commands.  Cranial nerves: makes eye contact with examiner, correctly counted providers in the room, confabulated with finger counting directly in her central field of vision, did not blink to threat bilaterally, difficulty with smooth pursuit particularly when looking to the right, but otherwise no obvious EOM paresis, face grossly symmetrical, intact sensation throughout b/l V1-V3.  Motor: LUE w/o drift, RUE w/ mild drift, LLE w/ mild drift (may be due to not following command), RLE falls to bed w/o effort against gravity.  Sensation: limited evaluation due to language deficit.  Coordination/gait: not assessed.     Impression: Interval development of expressive > receptive aphasia with right hemiparesis, localizable to left brain dysfunction, concerning for left MCA territory acute ischemic infarct.    [] Please obtain STAT CT head w/o contrast (new neurological finding of RLE paresis) - communicated with primary team.  [] Can obtain MRI brain w/o contrast as well - unlikely to   [] Please obtain 2 hour rEEG.  [] Resume AC (apixaban) when able. In the interim, consider starting aspirin for stroke prevention if safe from neurosurgical standpoint and discontinue when apixaban restarted.  [] Avoid hypotension. SBP goal 100 - 160 mmHg.  [] Start maintenance IV fluids.

## 2025-06-11 NOTE — CONSULT NOTE ADULT - ASSESSMENT
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

## 2025-06-11 NOTE — PROGRESS NOTE ADULT - SUBJECTIVE AND OBJECTIVE BOX
PAST 24HR EVENTS:  Rapid response and code stroke called overnight: Pt had dizziness when standing to go to bathroom, aox1 and more lethargic per nurse. Pt states she felt like she was moving while everything around her remained still. Upon neuro exam she was found to have RUE drift and RLQ VF cut. During rapid response pt began to return to her previous aox2 mental status. Repeat code stroke imaging was performed without acute findings.     Vital Signs Last 24 Hrs  T(C): 37.8 (11 Jun 2025 04:52), Max: 37.8 (11 Jun 2025 04:52)  T(F): 100 (11 Jun 2025 04:52), Max: 100 (11 Jun 2025 04:52)  HR: 102 (11 Jun 2025 04:52) (86 - 102)  BP: 144/63 (11 Jun 2025 04:52) (111/82 - 144/63)  BP(mean): --  RR: 18 (11 Jun 2025 04:52) (17 - 18)  SpO2: 97% (11 Jun 2025 04:52) (95% - 100%)    Parameters below as of 11 Jun 2025 04:52  Patient On (Oxygen Delivery Method): room air        MEDS:   bacitracin   Ointment 1 Application(s) Topical daily  chlorhexidine 2% Cloths 1 Application(s) Topical daily  cyclobenzaprine 10 milliGRAM(s) Oral two times a day  enoxaparin Injectable 40 milliGRAM(s) SubCutaneous every 24 hours  HYDROmorphone   Tablet 4 milliGRAM(s) Oral every 4 hours PRN  HYDROmorphone   Tablet 2 milliGRAM(s) Oral every 4 hours PRN  HYDROmorphone  Injectable 0.5 milliGRAM(s) IV Push every 4 hours PRN  insulin lispro (ADMELOG) corrective regimen sliding scale   SubCutaneous Before meals and at bedtime  metoprolol tartrate 50 milliGRAM(s) Oral two times a day  naloxone Injectable 0.1 milliGRAM(s) IV Push every 3 minutes PRN  ondansetron Injectable 4 milliGRAM(s) IV Push every 6 hours PRN  pantoprazole  Injectable 40 milliGRAM(s) IV Push daily  polyethylene glycol 3350 17 Gram(s) Oral daily  pramipexole 1.25 milliGRAM(s) Oral three times a day  pregabalin 100 milliGRAM(s) Oral two times a day  senna 2 Tablet(s) Oral at bedtime  sodium chloride 0.9% lock flush 3 milliLiter(s) IV Push every 8 hours      LABS:                        10.2   11.09 )-----------( 277      ( 11 Jun 2025 03:30 )             31.9     06-11    133[L]  |  99  |  22  ----------------------------<  128[H]  4.5   |  20[L]  |  0.88    Ca    8.3[L]      11 Jun 2025 03:30  Phos  2.4     06-10  Mg     1.90     06-10      PT/INR - ( 11 Jun 2025 03:30 )   PT: 12.1 sec;   INR: 1.04 ratio         PTT - ( 11 Jun 2025 03:30 )  PTT:29.5 sec    RADIOLOGY:   < from: CT Angio Brain Stroke Protocol  w/ IV Cont (06.11.25 @ 03:53) >  IMPRESSION:    CT PERFUSION:  Technical limitations: Motion degraded.    Core infarction: 0 ml  Penumbra / tissue at risk for active ischemia: 0 ml    CTA NECK:  No evidence of significant stenosis or occlusion.    CTA HEAD:  No large vessel occlusion, significant stenosis or vascular abnormality   identified.    < from: CT Brain Stroke Protocol (06.11.25 @ 03:53) >  IMPRESSION:  Evaluation degraded by motion. No gross acute intracranial hemorrhage or   significant mass effect. Bilateral posterior territory infarct involving   the bilateral occipital lobes and thalami similar to that seen on recent   MRI from 6/9/2025.    Findings were discussed with LIZZY TAPIA [id number] 6/11/2025 4:05 AM   by Dr. Mccormack with read back confirmation.    --- End of Report ---    < end of copied text >          PHYSICAL EXAM:  Aox1 to name only, disoriented, (improved to Aox2 upon repeat questioning ~15 min later and more alert)  Follows commands  EOS, EOMI, PERRL  RT lower RADHA drain: 30cc, serosang     RUE MOTOR:   Delt 5/5  Bicep 5/5  Tricep 5/5      HG 5/5      LUE MOTOR:   Delt 5/5  Bicep 5/5   Tricep 5/5     HG 5/5     RLE MOTOR:   HF 5/5            KF 5/5          KE 5/5         DF 5/5         PF 5/5    EHL 5/5    LLE MOTOR:   HF 5/5        KF 5/5          KE 5/5            DF 5/5            PF 5/5       EHL 5/5      SENSATION: intact to light touch   Incision dressing c/d/i

## 2025-06-11 NOTE — CONSULT NOTE ADULT - CONSULT REASON
Stroke on MRI
eval for rehab
Q1 hour neurochecks, ventilator management, hemodynamic monitoring
diabetes/HTN
T3-pelvis PSF per neurosurgery. Myocutaneous flap closure with plastic surgery. JPx3. R and L inferior JPs deep, superior RADHA superficial.  hemorrhagic shock  acute respiratory failure

## 2025-06-11 NOTE — PROGRESS NOTE ADULT - SUBJECTIVE AND OBJECTIVE BOX
*************************************  NEUROLOGY PROGRESS NOTE  **************************************    VIRGINIA HUFFMAN  Female  MRN-[id number]    Subjective: Patient to be seen and examined at bedside with Neurology team and attending in AM    Interval History:    patient was in the bathroom and when she was transferred back she started experiencing dizziness where she felt all her body was moving. Rapid response was called and stroke code was called. on evaluation patient denied dizziness. LKW by nurse was 2:15 am. She was found to have RUE drift and right inferior quadrantanopsia on examination. Repeat code stroke imaging was performed without acute findings.    VITAL SIGNS:  Vital Signs Last 24 Hrs  T(C): 37.6 (10 Cj 2025 23:40), Max: 37.6 (10 Cj 2025 23:40)  T(F): 99.6 (10 Cj 2025 23:40), Max: 99.6 (10 Cj 2025 23:40)  HR: 95 (10 Cj 2025 23:40) (86 - 98)  BP: 135/66 (10 Cj 2025 23:40) (113/66 - 135/66)  BP(mean): --  RR: 18 (10 Cj 2025 23:40) (17 - 18)  SpO2: 95% (10 Cj 2025 23:40) (95% - 98%)    Parameters below as of 10 Cj 2025 23:40  Patient On (Oxygen Delivery Method): room air    PHYSICAL EXAMINATION: General - NAD  Eyes - Fundoscopy with flat, sharp optic discs and no hemorrhage or exudates; Fundoscopy not well visualized; Fundoscopy not performed due to safety precautions in the setting of the COVID-19 pandemic    Neurologic Exam:  Mental status - Awake, Alert, Oriented to person, place, and time. Speech fluent, repetition and naming intact. Follows simple and complex commands. Attention/concentration, recent and remote memory (including registration and recall), and fund of knowledge intact    Cranial nerves - PERRLA, VFF, EOMI, face sensation (V1-V3) intact b/l, facial strength intact without asymmetry b/l, hearing intact b/l, palate with symmetric elevation, trapezius OR sternocleidomastiod 5/5 strength b/l, tongue midline on protrusion with full lateral movement    Motor - Normal bulk and tone throughout. No pronator drift.  Strength testing            Deltoid      Biceps      Triceps     Wrist Extension    Wrist Flexion     Interossei         R            5                 5               5                     5                              5                        5                 5  L             5                 5               5                     5                              5                        5                 5              Hip Flexion    Hip Extension    Knee Flexion    Knee Extension    Dorsiflexion    Plantar Flexion  R              5                           5                       5                           5                            5                          5  L              5                           5                        5                           5                            5                          5    Sensation - Light touch/temperature OR pain/vibration intact throughout    DTR's -             Biceps      Triceps     Brachioradialis      Patellar    Ankle    Toes/plantar response  R             2+             2+                  2+                       2+            2+                 Down  L              2+             2+                 2+                        2+           2+                 Down    Coordination - Finger to Nose intact b/l. No tremors appreciated    Gait and station - Normal casual gait. Romberg (-)      LABS:    CBC                       10.2   11.09 )-----------( 277      ( 11 Jun 2025 03:30 )             31.9     Chem 06-10    133[L]  |  99  |  19  ----------------------------<  102[H]  3.9   |  23  |  0.74    Ca    8.0[L]      10 Cj 2025 06:06  Phos  2.4     06-10  Mg     1.90     06-10      LFTs   Coagulopathy   Lipid Panel 06-10 Chol 134 LDL -- HDL 29[L] Trig 215[H]  A1c   Cardiac enzymes     U/A Urinalysis Basic - ( 10 Cj 2025 06:06 )    Color: x / Appearance: x / SG: x / pH: x  Gluc: 102 mg/dL / Ketone: x  / Bili: x / Urobili: x   Blood: x / Protein: x / Nitrite: x   Leuk Esterase: x / RBC: x / WBC x   Sq Epi: x / Non Sq Epi: x / Bacteria: x      CSF  Immunological  Other      RADIOLOGY & ADDITIONAL STUDIES:           *************************************  NEUROLOGY PROGRESS NOTE  **************************************    VIRGINIA HUFFMAN  Female  MRN-[id number]    Subjective: Patient to be seen and examined at bedside with Neurology team and attending in AM    Interval History:    patient was in the bathroom and when she was transferred back she started experiencing dizziness where she felt all her body was moving. Rapid response was called and stroke code was called. on evaluation patient denied dizziness. LKW by nurse was 2:15 am. She was found to have RUE drift and right inferior quadrantanopsia on examination. Repeat code stroke imaging was performed without acute findings.    VITAL SIGNS:  Vital Signs Last 24 Hrs  T(C): 37.6 (10 Cj 2025 23:40), Max: 37.6 (10 Cj 2025 23:40)  T(F): 99.6 (10 Cj 2025 23:40), Max: 99.6 (10 Cj 2025 23:40)  HR: 95 (10 Cj 2025 23:40) (86 - 98)  BP: 135/66 (10 Cj 2025 23:40) (113/66 - 135/66)  BP(mean): --  RR: 18 (10 Cj 2025 23:40) (17 - 18)  SpO2: 95% (10 Cj 2025 23:40) (95% - 98%)    Parameters below as of 10 Cj 2025 23:40  Patient On (Oxygen Delivery Method): room air    PHYSICAL EXAMINATION: General - NAD    Neurologic Exam:  Mental status - Awake, Alert, Oriented to person, place, and time. Speech fluent, repetition and naming intact.     Cranial nerves - VF: there is right inferior quadrantanopsia, EOMI, face sensation (V1-V3) intact b/l, facial strength intact without asymmetry b/l    Motor - Normal bulk and tone throughout. there is RUE drift on examination   Strength testing            Deltoid      Biceps      Triceps     Wrist Extension    Wrist Flexion     Interossei         R            5                 5               5                     5                              5                        5                 5  L             5                 5               5                     5                              5                        5                 5              Hip Flexion    Hip Extension    Knee Flexion    Knee Extension    Dorsiflexion    Plantar Flexion  R              5                           5                       5                           5                            5                          5  L              5                           5                        5                           5                            5                          5    Sensation - Light touch intact throughout    DTR's - deferred in focused exam    Coordination - Finger to Nose intact b/l. No tremors appreciated. HTS could not be performed     Gait and station - could not be tested      LABS:    CBC                       10.2   11.09 )-----------( 277      ( 11 Jun 2025 03:30 )             31.9     Chem 06-10    133[L]  |  99  |  19  ----------------------------<  102[H]  3.9   |  23  |  0.74    Ca    8.0[L]      10 Cj 2025 06:06  Phos  2.4     06-10  Mg     1.90     06-10      LFTs   Coagulopathy   Lipid Panel 06-10 Chol 134 LDL -- HDL 29[L] Trig 215[H]  A1c   Cardiac enzymes     U/A Urinalysis Basic - ( 10 Cj 2025 06:06 )    Color: x / Appearance: x / SG: x / pH: x  Gluc: 102 mg/dL / Ketone: x  / Bili: x / Urobili: x   Blood: x / Protein: x / Nitrite: x   Leuk Esterase: x / RBC: x / WBC x   Sq Epi: x / Non Sq Epi: x / Bacteria: x      CSF  Immunological  Other      RADIOLOGY & ADDITIONAL STUDIES:      < from: CT Angio Brain Stroke Protocol  w/ IV Cont (06.11.25 @ 03:53) >    IMPRESSION:    CT PERFUSION:  Technical limitations: Motion degraded.    Core infarction: 0 ml  Penumbra / tissue at risk for active ischemia: 0 ml    CTA NECK:  No evidence of significant stenosis or occlusion.    CTA HEAD:  No large vessel occlusion, significant stenosis or vascular abnormality   identified.      < end of copied text >

## 2025-06-11 NOTE — PROGRESS NOTE ADULT - ASSESSMENT
74 y/o female PMH of HTN, SILVANA, PAF on Eliquis PE (in 1980s) OA, hiatal hernia (repair 2023) gastroparesis, peripheral neuropathy, SILVANA on CPAP, and extensive prior spinal surgery, admitted s/p T4-Pelvis posterior laminectomy and fusion. Neurology consulted for MRI Brain finding of acute/subacute bilateral occipital infarcts. Patient has a history of paroxysmal afib but was taken off of eliquis. She episode of vertigo. Exam minimal change. CT CTA CTP no acute findings.    mRS: 2  LKN: 2:15 am  NIHSS: 2 RUE drift and right inferior quadrantanopsia    Not a tenecteplase candidate due to recent stroke.  Not a mechanical thrombectomy candidate due to no LVO.    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     Recommendations:  [] please restart eliquis 5mg BID when medically able  [] Lipid panel, HbA1c  [] Lovenox SQ and SCDs for DVT prophylaxis   [] BP goal: normotensive to 140/80  [] Telemonitoring  [] Stroke neurological checks and vital signs Q4  [] BGM goals 140-180  [] PT/OT; S/S evaluation    performed:  [x] a1c 5.2  [x] ldl 69  [x] TTE EF 70%

## 2025-06-11 NOTE — CONSULT NOTE ADULT - PROBLEM SELECTOR RECOMMENDATION 10
[FreeTextEntry1] : Ms. Musa is a 63 yr old female, who presents today for follow up  in regards type 2 diabetes.  \par Per patient , has been diabetic since 1983\par She is obese, her insulin dose requirements a very high, we did 24 hr urine cortisol recently to rule out cushing disease, it was normal.\par \par Currently taking toujeo 40 units daily at night,  U 500, 180 units before breakfast, and before dinner \par Ozempic 1 mg weekly \par Also on jardiance 25 mg daily.\par \par \par FS in office  261, she tells  me she just ate a donut, normal she does not eat donuts or sugary things\par current severity: uncontrolled\par A1C 9.9% in 8/2020, 10.8% in 1/22, 11 in 5/22 , 8.9% in 11/22,  A1c 9.4% 1/18/23 , 8.6% in 4/23\par \par \par Home Glucose Monitoring\par Freestyle Robert\par she has robert but did not bring it with her.\par \par Says she has been having lows lately in mornings , in 50s, had one episode of 35.\par \par \par Diet not bad but eats some carbs , rice and arnie bread.\par \par Diabetic History\par ER Visits:  none\par Hospitalizations:   none\par Diet Therapy: now watching his diet\par Diabetic Education:   no\par Exercise:   not active, has back issues\par Last eye exam: 3 months ago (-) Neo Recently fell on floor due to syncope event, due to blood sugars\par \par \par 
Brianna stewart at home  - c/w PPI

## 2025-06-11 NOTE — CONSULT NOTE ADULT - PROBLEM SELECTOR RECOMMENDATION 7
- has not been on CPAP last few weeks prior to surgery due to sialadenitis  - son to bring in CPAP machine

## 2025-06-11 NOTE — CONSULT NOTE ADULT - PROBLEM SELECTOR RECOMMENDATION 2
- suspected embolic CVA  - A1c 5.2 and LDL 69  - off AC due to post operative blood loss anemia  - Tele montior   - MRI b/l occipital strokes   - Stroke code 6/10 with RT UE drift;  RT inferior quad   - CTA and CT head w/o overt thrombosis   - examined this AM with neuro in room new RT LE weakness   - restart home statin   - rec CT head and EEG  - f/u Neuro recs - suspected embolic CVA  - A1c 5.2 and LDL 69  - off AC due to post operative blood loss anemia  - Tele montior   - MRI b/l occipital strokes   - Stroke code 6/10 with RT UE drift;  RT inferior quad   - CTA and CT head w/o overt thrombosis   - RLE slightly weaker on exam noted 6/7 by eICU; examined this AM with neuro in room new ? RT LE weakness   - restart home statin   - rec CT head and EEG  - f/u Neuro recs

## 2025-06-11 NOTE — PROGRESS NOTE ADULT - ASSESSMENT
VIRGINIA HUFFMAN is a 75yFemale s/p PSF with closure 6/4    - RADHA dc'd  - will continue to follow  - rest of care per primary    Plastic Surgery  [facility]: [id number]  [facility]: [phone]

## 2025-06-11 NOTE — PROGRESS NOTE ADULT - PROBLEM SELECTOR PLAN 1
- Neurochecks q4  - TLSO when OOB  - Daily CBC/BMP  - Standing XR when able  - RT lower drain per plastics - 30cc, serosang   - Incentive spirometry  - DVT ppx   - Bowel regimen    Pager [phone]   Case discussed with attending neurosurgeon Dr. Stroud

## 2025-06-11 NOTE — CONSULT NOTE ADULT - SUBJECTIVE AND OBJECTIVE BOX
HPI:         (22 May 2025 09:58)      PAST MEDICAL & SURGICAL HISTORY:  H/O seasonal allergies      History of pulmonary embolism  developed after billings abigail surgery,1984 treated with coumadin 3 months, no issues after      Restless leg syndrome      GERD (gastroesophageal reflux disease)      OA (osteoarthritis)      H/O scoliosis      Essential hypertension      Depression      Osteoporosis      2019 novel coronavirus disease (COVID-19)  Dec 2020- hospitalized for 3 days, did not require home O2, denies residual symptoms      Hiatal hernia      History of chronic pain      Peripheral neuropathy      Chronic constipation      Chronic GERD      Closed fracture of thoracic vertebra      MSSA (methicillin susceptible Staphylococcus aureus) infection      SILVANA on CPAP      Paroxysmal atrial fibrillation      PFO (patent foramen ovale)      Hearing loss      Postlaminectomy syndrome      Delayed gastric emptying complicated from hernia      Anemia      DM2 (diabetes mellitus, type 2)      S/P repair of paraesophageal hernia  2001      S/P spinal fusion  L4-L5 1966      Scoliosis  s/p placement of billings abigail x 2 1984      S/P spinal surgery  x 2 1985, 1986      Removal of pin, plate, abigail, or screw  1991- removal of billings abigail      S/P hysterectomy  1982      S/P hip replacement  left (2008)      S/P shoulder surgery  right (2012)      S/P dilatation and curettage  1981      H/O arthroscopy of knee  June 2021      S/P cataract surgery      History of bilateral hip replacements      History of repair of hiatal hernia          Social History:  lives with son walks with rollator at baseline     FAMILY HISTORY:  Family history of abdominal aortic aneurysm (AAA) (Mother)        Allergies    Dilantin (Urticaria)  penicillins (Urticaria)    Intolerances    erythromycin (Stomach Upset; Vomiting; Nausea)        MEDICATIONS  (STANDING):  bacitracin   Ointment 1 Application(s) Topical daily  chlorhexidine 2% Cloths 1 Application(s) Topical daily  cyclobenzaprine 10 milliGRAM(s) Oral two times a day  enoxaparin Injectable 40 milliGRAM(s) SubCutaneous every 24 hours  insulin lispro (ADMELOG) corrective regimen sliding scale   SubCutaneous Before meals and at bedtime  metoprolol tartrate 50 milliGRAM(s) Oral two times a day  pantoprazole  Injectable 40 milliGRAM(s) IV Push daily  polyethylene glycol 3350 17 Gram(s) Oral daily  pramipexole 1.25 milliGRAM(s) Oral three times a day  pregabalin 100 milliGRAM(s) Oral two times a day  senna 2 Tablet(s) Oral at bedtime  sodium chloride 0.9% lock flush 3 milliLiter(s) IV Push every 8 hours    MEDICATIONS  (PRN):  acetaminophen     Tablet .. 650 milliGRAM(s) Oral every 6 hours PRN Temp greater or equal to 38C (100.4F), Mild Pain (1 - 3)  naloxone Injectable 0.1 milliGRAM(s) IV Push every 3 minutes PRN For ANY of the following changes in patient status:  A. RR LESS THAN 10 breaths per minute, B. Oxygen saturation LESS THAN 90%, C. Sedation score of 6  ondansetron Injectable 4 milliGRAM(s) IV Push every 6 hours PRN Nausea  oxyCODONE    IR 5 milliGRAM(s) Oral every 6 hours PRN Moderate Pain (4 - 6)  oxyCODONE    IR 10 milliGRAM(s) Oral every 6 hours PRN Severe Pain (7 - 10)      Review of Systems:   CONSTITUTIONAL: No fever, weight loss, or fatigue  EYES: No eye pain, visual disturbances, or discharge  ENMT:  No difficulty hearing, tinnitus, vertigo; No sinus or throat pain  NECK: No pain or stiffness  BREASTS: No pain, masses, or nipple discharge  RESPIRATORY: No cough, wheezing, chills or hemoptysis; No shortness of breath  CARDIOVASCULAR: No chest pain, palpitations, dizziness, or leg swelling  GASTROINTESTINAL: No abdominal or epigastric pain. No nausea, vomiting, or hematemesis; No diarrhea or constipation. No melena or hematochezia.  GENITOURINARY: No dysuria, frequency, hematuria, or incontinence  NEUROLOGICAL: No headaches, memory loss, loss of strength, numbness, or tremors  SKIN: No itching, burning, rashes, or lesions   LYMPH NODES: No enlarged glands  ENDOCRINE: No heat or cold intolerance; No hair loss  MUSCULOSKELETAL: No joint pain or swelling; No muscle  PSYCHIATRIC: No depression, anxiety, mood swings, or difficulty sleeping  HEME/LYMPH: No easy bruising, or bleeding gums  ALLERGY AND IMMUNOLOGIC: No hives or eczema          CAPILLARY BLOOD GLUCOSE      POCT Blood Glucose.: 127 mg/dL (11 Jun 2025 08:13)  POCT Blood Glucose.: 141 mg/dL (11 Jun 2025 03:10)  POCT Blood Glucose.: 121 mg/dL (10 Cj 2025 21:20)  POCT Blood Glucose.: 113 mg/dL (10 Cj 2025 17:20)  POCT Blood Glucose.: 130 mg/dL (10 Cj 2025 13:50)    I&O's Summary    10 Cj 2025 07:01  -  11 Jun 2025 07:00  --------------------------------------------------------  IN: 250 mL / OUT: 260 mL / NET: -10 mL    11 Jun 2025 07:01  -  11 Jun 2025 11:47  --------------------------------------------------------  IN: 0 mL / OUT: 200 mL / NET: -200 mL      Vital Signs Last 24 Hrs  T(C): 36.8 (11 Jun 2025 08:00), Max: 37.8 (11 Jun 2025 04:52)  T(F): 98.2 (11 Jun 2025 08:00), Max: 100 (11 Jun 2025 04:52)  HR: 91 (11 Jun 2025 08:00) (91 - 102)  BP: 104/51 (11 Jun 2025 08:00) (104/51 - 144/63)  BP(mean): 64 (11 Jun 2025 08:00) (64 - 64)  RR: 17 (11 Jun 2025 08:00) (17 - 18)  SpO2: 98% (11 Jun 2025 08:00) (95% - 100%)    Parameters below as of 11 Jun 2025 08:00  Patient On (Oxygen Delivery Method): room air        PHYSICAL EXAM:    GENERAL: NAD  EYES: conjunctiva and sclera clear  NECK: Supple, No JVD  CHEST/LUNG: Clear to auscultation bilaterally; No wheeze  HEART: Regular rate and rhythm;   ABDOMEN: Soft, Nontender, Nondistended; Bowel sounds present  EXTREMITIES:  no edema   PSYCH: AAOx2 self and hospital   NEUROLOGY: RT LE 3/5; unable test visual fields confabulating   SKIN: bandage upper back       LABS:                        10.2   11.09 )-----------( 277      ( 11 Jun 2025 03:30 )             31.9     06-11    133[L]  |  99  |  22  ----------------------------<  128[H]  4.5   |  20[L]  |  0.88    Ca    8.3[L]      11 Jun 2025 03:30  Phos  2.4     06-10  Mg     1.90     06-10      PT/INR - ( 11 Jun 2025 03:30 )   PT: 12.1 sec;   INR: 1.04 ratio         PTT - ( 11 Jun 2025 03:30 )  PTT:29.5 sec      Urinalysis Basic - ( 11 Jun 2025 03:30 )    Color: x / Appearance: x / SG: x / pH: x  Gluc: 128 mg/dL / Ketone: x  / Bili: x / Urobili: x   Blood: x / Protein: x / Nitrite: x   Leuk Esterase: x / RBC: x / WBC x   Sq Epi: x / Non Sq Epi: x / Bacteria: x        RADIOLOGY & ADDITIONAL TESTS:    Imaging Personally Reviewed:< from: CT Thoracic Spine w/ IV Cont (04.06.25 @ 00:24) >  FINDINGS:    Again demonstrated status post fusion spanning T9-L3 with multilevel   transpedicular screws at the T9-T11 and L1-L3 vertebral bodies with   vertical supporting rods. Status post T12 corpectomy with screw noted in   the T12 right pedicle and into the vertebral body. The hardware is in   stable position from prior exam. There is no evidence of hardware   fracture. There is no periprosthetic lucency surrounding the posterior   fusion hardware Again demonstrated is a comminuted distracted fracture at   T12 involving the vertebral body and posterior elements.    The vertebral body heights are unchanged from prior exam. There is   ankylosis of the thoracolumbar spine with fusion across the posterior   elements.    Evaluation of the spinal canal is limited particularly from T9 through L3   secondary to artifact from surgical hardware. There is no evidence of   < from: MR Head No Cont (06.09.25 @ 19:51) >    IMPRESSION:    1. Motion limited study.  2. Acute/subacute bilateral PCA distribution infarcts with associated   cytotoxic edema. No associated hemorrhage.  3. Multiple additional nonspecific abnormal white matter foci of T2/FLAIR   prolongation statistically favoring microvascular type changes.    --- End of Report ---    < end of copied text >  < from: MR Cervical Spine No Cont (06.09.25 @ 19:51) >  IMPRESSION:  Patient motion degrades image quality.   Moderate mild to   moderate degenerative disc disease and spondylosis at C3-4 through C6-7   with loss of disc height and associated degenerative endplate changes.   There is narrowing of the BILATERAL C4-5through C6-7 neural foramina due   to uncovertebral spurring and facet osteophytic hypertrophy. Posterior   osteophytic ridge/disc complexes at C5-C6-7 flatten the ventral thecal   sac.  Posterior fusion of the thoracic spine.      < end of copied text >  high-grade spinal canal stenosis within these limitations.    Liver: Multilobulated cyst is present within the liver and not completely   in  the field of view. Bladder is distended.  Soft tissues: Postoperative scartissue and fluid within the paraspinous  musculature is stable to slightly decreased when compared to prior CT and   MRI  evaluations. No worsening fluid collections identified.    IMPRESSION:  Since most recent CT thoracic and lumbar spine dated 11/9/2024, stable   exam. Stable appearance of the posterior fusion hardware T9-L3 and stable   appearance of the corpectomy device at T12.    There is a change from the preliminary interpretation which was discussed   by Dr. López with LIZZY Mg 4/6/2025 8:22 AM with read back.    < end of copied text >  < from: CT Angio Brain Stroke Protocol  w/ IV Cont (06.11.25 @ 03:53) >  IMPRESSION:    CT PERFUSION:  Technical limitations: Motion degraded.    Core infarction: 0 ml  Penumbra / tissue at risk for active ischemia: 0 ml    CTA NECK:  No evidence of significant stenosis or occlusion.    CTA HEAD:  No large vessel occlusion, significant stenosis or vascular abnormality   identified.        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 Pt underwent T3-pelvis with L4 pedicle osteotomy and T3-pelvis fusion. T3-pelvis PSF w/ Myocutaneous flap. Noted intraoperative blood loss >1 L requiring 4 u intraoperative prbc. Post op required pressors and was transferred to SICU. s/p multiple units prbc 1 u FFP and 1 U cryo. Noted to have RT gaze prefrence and RT LE weakness. MRI done showed b/l occipital infarcts. Neuro consulted felt CVA was cardioembolic in setting of being off AC. Stroke code called on 6/11 notable for RUE drift  and RT inferior quadrantanopia. Repeat imaging without acute findings. No TPA given. Medicine consulted for HTN.     PAST MEDICAL & SURGICAL HISTORY:  H/O seasonal allergies      History of pulmonary embolism  developed after billings abigail surgery,1984 treated with coumadin 3 months, no issues after      Restless leg syndrome      GERD (gastroesophageal reflux disease)      OA (osteoarthritis)      H/O scoliosis      Essential hypertension      Depression      Osteoporosis      2019 novel coronavirus disease (COVID-19)  Dec 2020- hospitalized for 3 days, did not require home O2, denies residual symptoms      Hiatal hernia      History of chronic pain      Peripheral neuropathy      Chronic constipation      Chronic GERD      Closed fracture of thoracic vertebra      MSSA (methicillin susceptible Staphylococcus aureus) infection      SILVANA on CPAP      Paroxysmal atrial fibrillation      PFO (patent foramen ovale)      Hearing loss      Postlaminectomy syndrome      Delayed gastric emptying complicated from hernia      Anemia      DM2 (diabetes mellitus, type 2)      S/P repair of paraesophageal hernia  2001      S/P spinal fusion  L4-L5 1966      Scoliosis  s/p placement of billings abigail x 2 1984      S/P spinal surgery  x 2 1985, 1986      Removal of pin, plate, abigail, or screw  1991- removal of billings abigail      S/P hysterectomy  1982      S/P hip replacement  left (2008)      S/P shoulder surgery  right (2012)      S/P dilatation and curettage  1981      H/O arthroscopy of knee  June 2021      S/P cataract surgery      History of bilateral hip replacements      History of repair of hiatal hernia          Social History:  lives with son walks with rollator at baseline     FAMILY HISTORY:  Family history of abdominal aortic aneurysm (AAA) (Mother)        Allergies    Dilantin (Urticaria)  penicillins (Urticaria)    Intolerances    erythromycin (Stomach Upset; Vomiting; Nausea)        MEDICATIONS  (STANDING):  bacitracin   Ointment 1 Application(s) Topical daily  chlorhexidine 2% Cloths 1 Application(s) Topical daily  cyclobenzaprine 10 milliGRAM(s) Oral two times a day  enoxaparin Injectable 40 milliGRAM(s) SubCutaneous every 24 hours  insulin lispro (ADMELOG) corrective regimen sliding scale   SubCutaneous Before meals and at bedtime  metoprolol tartrate 50 milliGRAM(s) Oral two times a day  pantoprazole  Injectable 40 milliGRAM(s) IV Push daily  polyethylene glycol 3350 17 Gram(s) Oral daily  pramipexole 1.25 milliGRAM(s) Oral three times a day  pregabalin 100 milliGRAM(s) Oral two times a day  senna 2 Tablet(s) Oral at bedtime  sodium chloride 0.9% lock flush 3 milliLiter(s) IV Push every 8 hours    MEDICATIONS  (PRN):  acetaminophen     Tablet .. 650 milliGRAM(s) Oral every 6 hours PRN Temp greater or equal to 38C (100.4F), Mild Pain (1 - 3)  naloxone Injectable 0.1 milliGRAM(s) IV Push every 3 minutes PRN For ANY of the following changes in patient status:  A. RR LESS THAN 10 breaths per minute, B. Oxygen saturation LESS THAN 90%, C. Sedation score of 6  ondansetron Injectable 4 milliGRAM(s) IV Push every 6 hours PRN Nausea  oxyCODONE    IR 5 milliGRAM(s) Oral every 6 hours PRN Moderate Pain (4 - 6)  oxyCODONE    IR 10 milliGRAM(s) Oral every 6 hours PRN Severe Pain (7 - 10)      Review of Systems:   CONSTITUTIONAL: No fever, weight loss, or fatigue  EYES: No eye pain, visual disturbances, or discharge  ENMT:  No difficulty hearing, tinnitus, vertigo; No sinus or throat pain  NECK: No pain or stiffness  BREASTS: No pain, masses, or nipple discharge  RESPIRATORY: No cough, wheezing, chills or hemoptysis; No shortness of breath  CARDIOVASCULAR: No chest pain, palpitations, dizziness, or leg swelling  GASTROINTESTINAL: No abdominal or epigastric pain. No nausea, vomiting, or hematemesis; No diarrhea or constipation. No melena or hematochezia.  GENITOURINARY: No dysuria, frequency, hematuria, or incontinence  NEUROLOGICAL: No headaches, memory loss, loss of strength, numbness, or tremors  SKIN: No itching, burning, rashes, or lesions   LYMPH NODES: No enlarged glands  ENDOCRINE: No heat or cold intolerance; No hair loss  MUSCULOSKELETAL: No joint pain or swelling; No muscle  PSYCHIATRIC: No depression, anxiety, mood swings, or difficulty sleeping  HEME/LYMPH: No easy bruising, or bleeding gums  ALLERGY AND IMMUNOLOGIC: No hives or eczema          CAPILLARY BLOOD GLUCOSE      POCT Blood Glucose.: 127 mg/dL (11 Jun 2025 08:13)  POCT Blood Glucose.: 141 mg/dL (11 Jun 2025 03:10)  POCT Blood Glucose.: 121 mg/dL (10 Cj 2025 21:20)  POCT Blood Glucose.: 113 mg/dL (10 Cj 2025 17:20)  POCT Blood Glucose.: 130 mg/dL (10 Cj 2025 13:50)    I&O's Summary    10 Cj 2025 07:01  -  11 Jun 2025 07:00  --------------------------------------------------------  IN: 250 mL / OUT: 260 mL / NET: -10 mL    11 Jun 2025 07:01  -  11 Jun 2025 11:47  --------------------------------------------------------  IN: 0 mL / OUT: 200 mL / NET: -200 mL      Vital Signs Last 24 Hrs  T(C): 36.8 (11 Jun 2025 08:00), Max: 37.8 (11 Jun 2025 04:52)  T(F): 98.2 (11 Jun 2025 08:00), Max: 100 (11 Jun 2025 04:52)  HR: 91 (11 Jun 2025 08:00) (91 - 102)  BP: 104/51 (11 Jun 2025 08:00) (104/51 - 144/63)  BP(mean): 64 (11 Jun 2025 08:00) (64 - 64)  RR: 17 (11 Jun 2025 08:00) (17 - 18)  SpO2: 98% (11 Jun 2025 08:00) (95% - 100%)    Parameters below as of 11 Jun 2025 08:00  Patient On (Oxygen Delivery Method): room air        PHYSICAL EXAM:    GENERAL: NAD  EYES: conjunctiva and sclera clear  NECK: Supple, No JVD  CHEST/LUNG: Clear to auscultation bilaterally; No wheeze  HEART: Regular rate and rhythm;   ABDOMEN: Soft, Nontender, Nondistended; Bowel sounds present  EXTREMITIES:  no edema   PSYCH: AAOx2 self and hospital   NEUROLOGY: RT LE 3/5; unable test visual fields confabulating   SKIN: bandage upper back       LABS:                        10.2   11.09 )-----------( 277      ( 11 Jun 2025 03:30 )             31.9     06-11    133[L]  |  99  |  22  ----------------------------<  128[H]  4.5   |  20[L]  |  0.88    Ca    8.3[L]      11 Jun 2025 03:30  Phos  2.4     06-10  Mg     1.90     06-10      PT/INR - ( 11 Jun 2025 03:30 )   PT: 12.1 sec;   INR: 1.04 ratio         PTT - ( 11 Jun 2025 03:30 )  PTT:29.5 sec      Urinalysis Basic - ( 11 Jun 2025 03:30 )    Color: x / Appearance: x / SG: x / pH: x  Gluc: 128 mg/dL / Ketone: x  / Bili: x / Urobili: x   Blood: x / Protein: x / Nitrite: x   Leuk Esterase: x / RBC: x / WBC x   Sq Epi: x / Non Sq Epi: x / Bacteria: x        RADIOLOGY & ADDITIONAL TESTS:    Imaging Personally Reviewed:< from: CT Thoracic Spine w/ IV Cont (04.06.25 @ 00:24) >  FINDINGS:    Again demonstrated status post fusion spanning T9-L3 with multilevel   transpedicular screws at the T9-T11 and L1-L3 vertebral bodies with   vertical supporting rods. Status post T12 corpectomy with screw noted in   the T12 right pedicle and into the vertebral body. The hardware is in   stable position from prior exam. There is no evidence of hardware   fracture. There is no periprosthetic lucency surrounding the posterior   fusion hardware Again demonstrated is a comminuted distracted fracture at   T12 involving the vertebral body and posterior elements.    The vertebral body heights are unchanged from prior exam. There is   ankylosis of the thoracolumbar spine with fusion across the posterior   elements.    Evaluation of the spinal canal is limited particularly from T9 through L3   secondary to artifact from surgical hardware. There is no evidence of   < from: MR Head No Cont (06.09.25 @ 19:51) >    IMPRESSION:    1. Motion limited study.  2. Acute/subacute bilateral PCA distribution infarcts with associated   cytotoxic edema. No associated hemorrhage.  3. Multiple additional nonspecific abnormal white matter foci of T2/FLAIR   prolongation statistically favoring microvascular type changes.    --- End of Report ---    < end of copied text >  < from: MR Cervical Spine No Cont (06.09.25 @ 19:51) >  IMPRESSION:  Patient motion degrades image quality.   Moderate mild to   moderate degenerative disc disease and spondylosis at C3-4 through C6-7   with loss of disc height and associated degenerative endplate changes.   There is narrowing of the BILATERAL C4-5through C6-7 neural foramina due   to uncovertebral spurring and facet osteophytic hypertrophy. Posterior   osteophytic ridge/disc complexes at C5-C6-7 flatten the ventral thecal   sac.  Posterior fusion of the thoracic spine.      < end of copied text >  high-grade spinal canal stenosis within these limitations.    Liver: Multilobulated cyst is present within the liver and not completely   in  the field of view. Bladder is distended.  Soft tissues: Postoperative scartissue and fluid within the paraspinous  musculature is stable to slightly decreased when compared to prior CT and   MRI  evaluations. No worsening fluid collections identified.    IMPRESSION:  Since most recent CT thoracic and lumbar spine dated 11/9/2024, stable   exam. Stable appearance of the posterior fusion hardware T9-L3 and stable   appearance of the corpectomy device at T12.    There is a change from the preliminary interpretation which was discussed   by Dr. López with LIZZY Mg 4/6/2025 8:22 AM with read back.    < end of copied text >  < from: CT Angio Brain Stroke Protocol  w/ IV Cont (06.11.25 @ 03:53) >  IMPRESSION:    CT PERFUSION:  Technical limitations: Motion degraded.    Core infarction: 0 ml  Penumbra / tissue at risk for active ischemia: 0 ml    CTA NECK:  No evidence of significant stenosis or occlusion.    CTA HEAD:  No large vessel occlusion, significant stenosis or vascular abnormality   identified.        --- End of Report ---    < end of copied text >      Consultant(s) Notes Reviewed:      Care Discussed with Consultants/Other Providers:

## 2025-06-11 NOTE — CONSULT NOTE ADULT - PROBLEM SELECTOR RECOMMENDATION 5
- Home regimen: metoprolol 100mg BID and eliquis 5 BID ( on hold due to surgery and post operative blood loss anemia)   - currently on metoprolol 50 BID - Home regimen: metoprolol 100mg BID and eliquis 5 BID ( on hold due to surgery and post operative blood loss anemia)   - currently on metoprolol 50 BID  - can start DOAC/AC when ok from surgical standpoint

## 2025-06-11 NOTE — CONSULT NOTE ADULT - TIME BILLING
Acute encephalopathy, bilateral occipital infarcts, history of atrial fibrillation, no objection to anticoagulation for stroke prevention
- Ordering, reviewing, and interpreting labs, testing, and imaging.  - Independently obtaining a review of systems and performing a physical exam  - Reviewing consultant documentation/recommendations in addition to discussing plan of care with consultants.  - Counselling and educating patient and family regarding interpretation of aforementioned items and plan of care.

## 2025-06-11 NOTE — PROGRESS NOTE ADULT - SUBJECTIVE AND OBJECTIVE BOX
Plastic Surgery Progress Note (pg [facility]: [id number], [facility]: [phone])    SUBJECTIVE  The patient was seen and examined. No acute events overnight. Pain controlled, afebrile w/ stable vitals.     OBJECTIVE  ___________________________________________________  VITAL SIGNS / I&O's   Vital Signs Last 24 Hrs  T(C): 36.8 (11 Jun 2025 08:00), Max: 37.8 (11 Jun 2025 04:52)  T(F): 98.2 (11 Jun 2025 08:00), Max: 100 (11 Jun 2025 04:52)  HR: 91 (11 Jun 2025 08:00) (91 - 102)  BP: 104/51 (11 Jun 2025 08:00) (104/51 - 144/63)  BP(mean): 64 (11 Jun 2025 08:00) (64 - 64)  RR: 17 (11 Jun 2025 08:00) (17 - 18)  SpO2: 98% (11 Jun 2025 08:00) (95% - 100%)    Parameters below as of 11 Jun 2025 08:00  Patient On (Oxygen Delivery Method): room air          10 Cj 2025 07:01  -  11 Jun 2025 07:00  --------------------------------------------------------  IN:    Oral Fluid: 250 mL  Total IN: 250 mL    OUT:    Bulb (mL): 60 mL    Voided (mL): 200 mL  Total OUT: 260 mL    Total NET: -10 mL      11 Jun 2025 07:01  -  11 Jun 2025 13:01  --------------------------------------------------------  IN:  Total IN: 0 mL    OUT:    Blood Loss (mL): 0 mL    Voided (mL): 200 mL  Total OUT: 200 mL    Total NET: -200 mL        ___________________________________________________  PHYSICAL EXAM    General: NAD, Lying in bed   Neuro: Awake and alert  Back: soft, no collections, incision c/d/i JPx1 SS, removed    ___________________________________________________  LABS                        10.2   11.09 )-----------( 277      ( 11 Jun 2025 03:30 )             31.9     11 Jun 2025 03:30    133    |  99     |  22     ----------------------------<  128    4.5     |  20     |  0.88     Ca    8.3        11 Jun 2025 03:30  Phos  2.4       10 Cj 2025 06:06  Mg     1.90      10 Cj 2025 06:06      PT/INR - ( 11 Jun 2025 03:30 )   PT: 12.1 sec;   INR: 1.04 ratio         PTT - ( 11 Jun 2025 03:30 )  PTT:29.5 sec  CAPILLARY BLOOD GLUCOSE      POCT Blood Glucose.: 130 mg/dL (11 Jun 2025 12:04)  POCT Blood Glucose.: 127 mg/dL (11 Jun 2025 08:13)  POCT Blood Glucose.: 141 mg/dL (11 Jun 2025 03:10)  POCT Blood Glucose.: 121 mg/dL (10 Cj 2025 21:20)  POCT Blood Glucose.: 113 mg/dL (10 Cj 2025 17:20)  POCT Blood Glucose.: 130 mg/dL (10 Cj 2025 13:50)        Urinalysis Basic - ( 11 Jun 2025 03:30 )    Color: x / Appearance: x / SG: x / pH: x  Gluc: 128 mg/dL / Ketone: x  / Bili: x / Urobili: x   Blood: x / Protein: x / Nitrite: x   Leuk Esterase: x / RBC: x / WBC x   Sq Epi: x / Non Sq Epi: x / Bacteria: x      ___________________________________________________  MICRO  Recent Cultures:    ___________________________________________________  MEDICATIONS  (STANDING):  bacitracin   Ointment 1 Application(s) Topical daily  chlorhexidine 2% Cloths 1 Application(s) Topical daily  cyclobenzaprine 10 milliGRAM(s) Oral two times a day  enoxaparin Injectable 40 milliGRAM(s) SubCutaneous every 24 hours  insulin lispro (ADMELOG) corrective regimen sliding scale   SubCutaneous Before meals and at bedtime  metoprolol tartrate 50 milliGRAM(s) Oral two times a day  pantoprazole  Injectable 40 milliGRAM(s) IV Push daily  polyethylene glycol 3350 17 Gram(s) Oral daily  pramipexole 1.25 milliGRAM(s) Oral three times a day  pregabalin 100 milliGRAM(s) Oral two times a day  senna 2 Tablet(s) Oral at bedtime  sodium chloride 0.9% lock flush 3 milliLiter(s) IV Push every 8 hours    MEDICATIONS  (PRN):  acetaminophen     Tablet .. 650 milliGRAM(s) Oral every 6 hours PRN Temp greater or equal to 38C (100.4F), Mild Pain (1 - 3)  naloxone Injectable 0.1 milliGRAM(s) IV Push every 3 minutes PRN For ANY of the following changes in patient status:  A. RR LESS THAN 10 breaths per minute, B. Oxygen saturation LESS THAN 90%, C. Sedation score of 6  ondansetron Injectable 4 milliGRAM(s) IV Push every 6 hours PRN Nausea  oxyCODONE    IR 5 milliGRAM(s) Oral every 6 hours PRN Moderate Pain (4 - 6)  oxyCODONE    IR 10 milliGRAM(s) Oral every 6 hours PRN Severe Pain (7 - 10)

## 2025-06-11 NOTE — PROGRESS NOTE ADULT - SUBJECTIVE AND OBJECTIVE BOX
Patient is a 75y old  Female who presents with a chief complaint of SDA (11 Jun 2025 05:31)      HPI:  76 y/o female PMH of HTN, SILVANA, PAF on Eliquis PE (in 1980s) OA, hiatal hernia (repair 2023) gastroparesis, peripheral neuropathy, SILVANA on CPAP, and extensive prior spinal surgery most recently receiving thoracic lumbar fusion with cage with Dr. Stroud on 9/24, after a fall, complicated by sepsis, and paroxysmal atrial fibrillation, development of pleural effusions, symptomatic orthostatic hypotension, urinary retention, post op anemia, chest pain with spontaneous resolution, and paraspinal abscess s/p paraspinal collection aspiration on 10/25/24 presents to presurgical testing with diagnosis of unspecified fracture thoracic vertebrae, scoliosis, and postlaminectomy syndrome. Pt twisted her back a few weeks ago resulting in vertebral fracture. Pt is scheduled for T4-Pelvis posterior laminectomy and fusion with posterior instrumentation. Possible corpectomy T9. Lumbar osteotomy and illiac fixation.       (22 May 2025 09:58)    going for repeat CT head, noted to have worsening RLE weakness, also confabulation on exam. EEG also ordered.      REVIEW OF SYSTEMS  weakness    PAST MEDICAL & SURGICAL HISTORY  H/O seasonal allergies    History of pulmonary embolism    Restless leg syndrome    GERD (gastroesophageal reflux disease)    HTN (hypertension)    OA (osteoarthritis)    Fungal infection of skin    H/O scoliosis    Essential hypertension    Depression    Osteoporosis    Right hip pain    2019 novel coronavirus disease (COVID-19)    Hiatal hernia    History of chronic pain    Peripheral neuropathy    Chronic constipation    Chronic GERD    Closed fracture of thoracic vertebra    MSSA (methicillin susceptible Staphylococcus aureus) infection    SILVANA on CPAP    Paroxysmal atrial fibrillation    PFO (patent foramen ovale)    Hearing loss    Postlaminectomy syndrome    Delayed gastric emptying    Anemia    DM2 (diabetes mellitus, type 2)    S/P repair of paraesophageal hernia    S/P spinal fusion    Scoliosis    S/P spinal surgery    Removal of pin, plate, abigail, or screw    S/P hysterectomy    S/P hip replacement    S/P shoulder surgery    S/P dilatation and curettage    H/O arthroscopy of knee    H/O total knee replacement, right    S/P cataract surgery    History of bilateral hip replacements    History of repair of hiatal hernia       CURRENT FUNCTIONAL STATUS  6/10 Bed Mobility  Bed Mobility Training Rehab Potential: good, to achieve stated therapy goals  Bed Mobility Training Symptoms Noted During/After Treatment: none  Bed Mobility Training Rolling/Turning: moderate assist (50% patient effort);  bed rails;  verbal cues  Bed Mobility Training Sit-to-Supine: moderate assist (50% patient effort);  2 person assist;  verbal cues  Bed Mobility Training Supine-to-Sit: moderate assist (50% patient effort);  2 person assist;  bed rails;  verbal cues  Bed Mobility Training Limitations: impaired motor control;  decreased strength;  impaired postural control;  pain    Sit-Stand Transfer Training  Sit-to-Stand Transfer Training Rehab Potential: good, to achieve stated therapy goals  Sit-to-Stand Transfer Training Symptoms Noted During/After Treatment: none  Transfer Training Sit-to-Stand Transfer: moderate assist (50% patient effort);  2 person assist;  full weight-bearing   rolling walker;  +bed height raised, x2 repetitions   Transfer Training Stand-to-Sit Transfer: moderate assist (50% patient effort);  2 person assist;  full weight-bearing   rolling walker;  x2 repetitions   Sit-to-Stand Transfer Training Transfer Safety Analysis: decreased balance;  decreased strength;  impaired postural control;  pain;  rolling walker    Therapeutic Exercise  Therapeutic Exercise Rehab Effort: good  Therapeutic Exercise Symptoms Noted During/After Treatment: none  Therapeutic Exercise Detail: Pt. participated in semi-supine therapeutic exercises x10 throughout bilateral lower extremities including ankle pumps, heel slides, glute sets, quad sets.           FAMILY HISTORY   Family history of abdominal aortic aneurysm (AAA) (Mother)        RECENT LABS/IMAGING  CBC Full  -  ( 11 Jun 2025 03:30 )  WBC Count : 11.09 K/uL  RBC Count : 3.46 M/uL  Hemoglobin : 10.2 g/dL  Hematocrit : 31.9 %  Platelet Count - Automated : 277 K/uL  Mean Cell Volume : 92.2 fL  Mean Cell Hemoglobin : 29.5 pg  Mean Cell Hemoglobin Concentration : 32.0 g/dL  Auto Neutrophil # : x  Auto Lymphocyte # : x  Auto Monocyte # : x  Auto Eosinophil # : x  Auto Basophil # : x  Auto Neutrophil % : x  Auto Lymphocyte % : x  Auto Monocyte % : x  Auto Eosinophil % : x  Auto Basophil % : x    06-11    133[L]  |  99  |  22  ----------------------------<  128[H]  4.5   |  20[L]  |  0.88    Ca    8.3[L]      11 Jun 2025 03:30  Phos  2.4     06-10  Mg     1.90     06-10      Urinalysis Basic - ( 11 Jun 2025 03:30 )    Color: x / Appearance: x / SG: x / pH: x  Gluc: 128 mg/dL / Ketone: x  / Bili: x / Urobili: x   Blood: x / Protein: x / Nitrite: x   Leuk Esterase: x / RBC: x / WBC x   Sq Epi: x / Non Sq Epi: x / Bacteria: x        VITALS  T(C): 36.8 (06-11-25 @ 08:00), Max: 37.8 (06-11-25 @ 04:52)  HR: 91 (06-11-25 @ 08:00) (91 - 102)  BP: 104/51 (06-11-25 @ 08:00) (104/51 - 144/63)  RR: 17 (06-11-25 @ 08:00) (17 - 18)  SpO2: 98% (06-11-25 @ 08:00) (95% - 100%)  Wt(kg): --    ALLERGIES  Dilantin (Urticaria)  penicillins (Urticaria)  erythromycin (Stomach Upset; Vomiting; Nausea)      MEDICATIONS   acetaminophen     Tablet .. 650 milliGRAM(s) Oral every 6 hours PRN  bacitracin   Ointment 1 Application(s) Topical daily  chlorhexidine 2% Cloths 1 Application(s) Topical daily  cyclobenzaprine 10 milliGRAM(s) Oral two times a day  enoxaparin Injectable 40 milliGRAM(s) SubCutaneous every 24 hours  insulin lispro (ADMELOG) corrective regimen sliding scale   SubCutaneous Before meals and at bedtime  metoprolol tartrate 50 milliGRAM(s) Oral two times a day  naloxone Injectable 0.1 milliGRAM(s) IV Push every 3 minutes PRN  ondansetron Injectable 4 milliGRAM(s) IV Push every 6 hours PRN  oxyCODONE    IR 5 milliGRAM(s) Oral every 6 hours PRN  oxyCODONE    IR 10 milliGRAM(s) Oral every 6 hours PRN  pantoprazole  Injectable 40 milliGRAM(s) IV Push daily  polyethylene glycol 3350 17 Gram(s) Oral daily  pramipexole 1.25 milliGRAM(s) Oral three times a day  pregabalin 100 milliGRAM(s) Oral two times a day  senna 2 Tablet(s) Oral at bedtime  sodium chloride 0.9% lock flush 3 milliLiter(s) IV Push every 8 hours      ----------------------------------------------------------------------------------------  PHYSICAL EXAM  Constitutional - NAD, Comfortable  HEENT - NCAT, EOMI  Neck - Supple, No limited ROM  Chest - no respiratory distress   Cardiovascular - RRR, S1S2   Abdomen -  Soft, NTND  Extremities -  No calf tenderness   Neurologic Exam -                    Cognitive - Awake, Alert, AAO to self, place, month but not year (states 25 with prompting)     Communication - Fluent, mild dysarthria      Motor -                      LEFT    UE - ShAB 5/5, EF 5/5, EE 5/5, WE 5/5,  5/5                    RIGHT UE - ShAB 5/5, EF 5/5, EE 5/5, WE 5/5,  5/5                    LEFT    LE - HF 2/5, KE 2/5                     RIGHT LE - HF 1+/5, KE 2/5         Coordination - FTN intact     OculoVestibular - No saccades, No nystagmus, VOR         Balance - WNL Static  Psychiatric - Mood stable, Affect WNL  ----------------------------------------------------------------------------------------  ASSESSMENT/PLAN  74 yo f s/p T4-pelvis post lami, PSF on 6/4  seen by neurology for occipital cva seen on mri, code stroke overnight for RLE weakness, repeat ct head pending  PT for bed mobility, transfers, ambulation as tolerated  out of bed to chair as tolerated   OT for adls  Pain -dilaudid po prn, iv prn (must be off iv prior to rehab)  DVT PPX - lovenox  Rehab recommend acute inpatient rehab when medically cleared. Patient can tolerate 3 hours per day of therapy with medical supervision    Total time spent to review relevant records and imaging results, examine patient, complete documentation, and when applicable discuss the case with the patient, family, , social workers, and medical team:  35 minutes

## 2025-06-11 NOTE — CONSULT NOTE ADULT - PROBLEM SELECTOR RECOMMENDATION 8
- Home regimen : on celebrex and Dilaudid PRN lyrica and flexeril - Home regimen : on celebrex and Dilaudid PRN lyrica and flexeril  - off celebrex on lyrica and flexeril

## 2025-06-11 NOTE — CONSULT NOTE ADULT - PROBLEM SELECTOR RECOMMENDATION 4
- as per son at baseline does have cognitive impairment forget fullness usually is AOX3 - as per son at baseline does have cognitive impairment w/ forget fullness but usually is AOX3

## 2025-06-11 NOTE — CONSULT NOTE ADULT - PROBLEM SELECTOR RECOMMENDATION 11
- as per son no Hx of DM was on munjaro due to weight gain post prior surgery off since early may  - A1c 5.2 - as per son no Hx of DM was on Munjaro due to weight gain post prior surgery off since early may  - A1c 5.2

## 2025-06-11 NOTE — CONSULT NOTE ADULT - PROBLEM SELECTOR RECOMMENDATION 6
- Home regimen : chlorthalidone   - on metoprolol 50 BID  - currently at goal - Home regimen : chlorthalidone   - on metoprolol 50 BID  - currently at goal off chlorthalidone

## 2025-06-11 NOTE — PROGRESS NOTE ADULT - ASSESSMENT
74 y/o female PMH of HTN, SILVANA, PAF on Eliquis PE (in 1980s) OA, hiatal hernia (repair 2023) gastroparesis, peripheral neuropathy, SILVANA on CPAP, and extensive prior spinal surgery (last in 09/2024-T9 partial corpectomy, had paraspinal abscess drained in 10/2024) presented as SDA for T3 to pelvis fusion and instrumention with L4 PSO on 6/4/25.     6/4 OR for T3 to pelvis fusion and instrumention with L4 PSO, with Dr. Stroud (neurosurgery), Dr. Gray (ortho), and Dr. Bolton (plastics), closed with nylons. 4uPRBCs given intraop, has tim ubi, drains per plastics. Low pressures at end of case, pt required increasing phenylephrine intraop. 500 cc LR bolus given for increasing vasopressor requirements and elevated lactate.     6/5 POD 1, intubated and sedated (partially weaned for exam), MAEx4 spont under wrist restraints, incision c/d/i. On ancef 24h postop. Postop Hg 11.0, ramya /70.  6/7 POD 3,  H/H 8.2/23.4, trending cbc every 12h as long as patient is hemodynamically stable, hypokalemic this morning to 2.9 given potassium chloride tablets for repletion. On exam, Ms. French is more alert to person and place.   6/8 POD 4, H/H stable, exam improving. Deferring MRI for now as exam is improving and less concern for stroke. Daily H/H to decrease blood draws.   6/9 POD 5, Pending repeat H/H, now on floor, lower half of dressing saturated so dressing replaced.   6/10: pod 6, H&H stable, mri brain  Acute/subacute bilateral PCA distribution infarcts with associated, neurology called, drains per plastics (2 drains d'cd)  6/11 POD7, H&H stable, RT lower drain per plastics, Rapid response and code stroke called 2/2 worse AMS, CTH/CTA head/neck done, b/l PCA infarcts similar to MRI findings, no new acute findings. Neuro following

## 2025-06-12 ENCOUNTER — TRANSCRIPTION ENCOUNTER (OUTPATIENT)
Age: 75
End: 2025-06-12

## 2025-06-12 LAB
ANION GAP SERPL CALC-SCNC: 11 MMOL/L — SIGNIFICANT CHANGE UP (ref 7–14)
BUN SERPL-MCNC: 21 MG/DL — SIGNIFICANT CHANGE UP (ref 7–23)
CALCIUM SERPL-MCNC: 8.7 MG/DL — SIGNIFICANT CHANGE UP (ref 8.4–10.5)
CHLORIDE SERPL-SCNC: 100 MMOL/L — SIGNIFICANT CHANGE UP (ref 98–107)
CO2 SERPL-SCNC: 25 MMOL/L — SIGNIFICANT CHANGE UP (ref 22–31)
CREAT SERPL-MCNC: 0.68 MG/DL — SIGNIFICANT CHANGE UP (ref 0.5–1.3)
EGFR: 90 ML/MIN/1.73M2 — SIGNIFICANT CHANGE UP
EGFR: 90 ML/MIN/1.73M2 — SIGNIFICANT CHANGE UP
GLUCOSE BLDC GLUCOMTR-MCNC: 109 MG/DL — HIGH (ref 70–99)
GLUCOSE BLDC GLUCOMTR-MCNC: 118 MG/DL — HIGH (ref 70–99)
GLUCOSE BLDC GLUCOMTR-MCNC: 134 MG/DL — HIGH (ref 70–99)
GLUCOSE BLDC GLUCOMTR-MCNC: 134 MG/DL — HIGH (ref 70–99)
GLUCOSE SERPL-MCNC: 116 MG/DL — HIGH (ref 70–99)
HCT VFR BLD CALC: 30.9 % — LOW (ref 34.5–45)
HGB BLD-MCNC: 10.2 G/DL — LOW (ref 11.5–15.5)
MAGNESIUM SERPL-MCNC: 2 MG/DL — SIGNIFICANT CHANGE UP (ref 1.6–2.6)
MCHC RBC-ENTMCNC: 29.5 PG — SIGNIFICANT CHANGE UP (ref 27–34)
MCHC RBC-ENTMCNC: 33 G/DL — SIGNIFICANT CHANGE UP (ref 32–36)
MCV RBC AUTO: 89.3 FL — SIGNIFICANT CHANGE UP (ref 80–100)
NRBC # BLD AUTO: 0 K/UL — SIGNIFICANT CHANGE UP (ref 0–0)
NRBC # FLD: 0 K/UL — SIGNIFICANT CHANGE UP (ref 0–0)
NRBC BLD AUTO-RTO: 0 /100 WBCS — SIGNIFICANT CHANGE UP (ref 0–0)
PHOSPHATE SERPL-MCNC: 3.3 MG/DL — SIGNIFICANT CHANGE UP (ref 2.5–4.5)
PLATELET # BLD AUTO: 319 K/UL — SIGNIFICANT CHANGE UP (ref 150–400)
POTASSIUM SERPL-MCNC: 3.8 MMOL/L — SIGNIFICANT CHANGE UP (ref 3.5–5.3)
POTASSIUM SERPL-SCNC: 3.8 MMOL/L — SIGNIFICANT CHANGE UP (ref 3.5–5.3)
RBC # BLD: 3.46 M/UL — LOW (ref 3.8–5.2)
RBC # FLD: 14.7 % — HIGH (ref 10.3–14.5)
SODIUM SERPL-SCNC: 136 MMOL/L — SIGNIFICANT CHANGE UP (ref 135–145)
WBC # BLD: 8.65 K/UL — SIGNIFICANT CHANGE UP (ref 3.8–10.5)
WBC # FLD AUTO: 8.65 K/UL — SIGNIFICANT CHANGE UP (ref 3.8–10.5)

## 2025-06-12 PROCEDURE — 99233 SBSQ HOSP IP/OBS HIGH 50: CPT | Mod: FS

## 2025-06-12 PROCEDURE — 99232 SBSQ HOSP IP/OBS MODERATE 35: CPT

## 2025-06-12 PROCEDURE — 95816 EEG AWAKE AND DROWSY: CPT | Mod: 26

## 2025-06-12 RX ORDER — CELECOXIB 50 MG/1
1 CAPSULE ORAL
Refills: 0 | DISCHARGE

## 2025-06-12 RX ORDER — TIRZEPATIDE 7.5 MG/.5ML
7.5 INJECTION, SOLUTION SUBCUTANEOUS
Refills: 0 | DISCHARGE

## 2025-06-12 RX ORDER — OXYCODONE HYDROCHLORIDE 30 MG/1
1 TABLET ORAL
Qty: 0 | Refills: 0 | DISCHARGE
Start: 2025-06-12

## 2025-06-12 RX ORDER — CEFADROXIL 500 MG/1
1 CAPSULE ORAL
Refills: 0 | DISCHARGE

## 2025-06-12 RX ORDER — NALOXONE HYDROCHLORIDE 0.4 MG/ML
0.1 INJECTION, SOLUTION INTRAMUSCULAR; INTRAVENOUS; SUBCUTANEOUS
Qty: 0 | Refills: 0 | DISCHARGE
Start: 2025-06-12

## 2025-06-12 RX ORDER — HYDROMORPHONE/SOD CHLOR,ISO/PF 2 MG/10 ML
1 SYRINGE (ML) INJECTION
Refills: 0 | DISCHARGE

## 2025-06-12 RX ORDER — APIXABAN 2.5 MG/1
1 TABLET, FILM COATED ORAL
Qty: 0 | Refills: 0 | DISCHARGE
Start: 2025-06-12

## 2025-06-12 RX ORDER — POLYETHYLENE GLYCOL 3350 17 G/17G
17 POWDER, FOR SOLUTION ORAL
Qty: 0 | Refills: 0 | DISCHARGE
Start: 2025-06-12

## 2025-06-12 RX ADMIN — OXYCODONE HYDROCHLORIDE 10 MILLIGRAM(S): 30 TABLET ORAL at 15:30

## 2025-06-12 RX ADMIN — METOPROLOL SUCCINATE 100 MILLIGRAM(S): 50 TABLET, EXTENDED RELEASE ORAL at 06:37

## 2025-06-12 RX ADMIN — Medication 3 MILLILITER(S): at 21:33

## 2025-06-12 RX ADMIN — APIXABAN 5 MILLIGRAM(S): 2.5 TABLET, FILM COATED ORAL at 06:39

## 2025-06-12 RX ADMIN — CYCLOBENZAPRINE HYDROCHLORIDE 10 MILLIGRAM(S): 15 CAPSULE, EXTENDED RELEASE ORAL at 18:24

## 2025-06-12 RX ADMIN — PRAMIPEXOLE DIHYDROCHLORIDE 1.25 MILLIGRAM(S): 1 TABLET ORAL at 06:36

## 2025-06-12 RX ADMIN — PREGABALIN 100 MILLIGRAM(S): 50 CAPSULE ORAL at 06:36

## 2025-06-12 RX ADMIN — APIXABAN 5 MILLIGRAM(S): 2.5 TABLET, FILM COATED ORAL at 18:24

## 2025-06-12 RX ADMIN — ATORVASTATIN CALCIUM 40 MILLIGRAM(S): 80 TABLET, FILM COATED ORAL at 21:46

## 2025-06-12 RX ADMIN — PRAMIPEXOLE DIHYDROCHLORIDE 1.25 MILLIGRAM(S): 1 TABLET ORAL at 14:40

## 2025-06-12 RX ADMIN — OXYCODONE HYDROCHLORIDE 10 MILLIGRAM(S): 30 TABLET ORAL at 14:35

## 2025-06-12 RX ADMIN — PREGABALIN 100 MILLIGRAM(S): 50 CAPSULE ORAL at 18:24

## 2025-06-12 RX ADMIN — CYCLOBENZAPRINE HYDROCHLORIDE 10 MILLIGRAM(S): 15 CAPSULE, EXTENDED RELEASE ORAL at 06:37

## 2025-06-12 RX ADMIN — Medication 1 APPLICATION(S): at 14:36

## 2025-06-12 RX ADMIN — PRAMIPEXOLE DIHYDROCHLORIDE 1.25 MILLIGRAM(S): 1 TABLET ORAL at 21:45

## 2025-06-12 RX ADMIN — Medication 3 MILLILITER(S): at 06:53

## 2025-06-12 RX ADMIN — Medication 40 MILLIGRAM(S): at 14:32

## 2025-06-12 RX ADMIN — Medication 3 MILLILITER(S): at 14:22

## 2025-06-12 NOTE — DISCHARGE NOTE PROVIDER - NSDCCPCAREPLAN_GEN_ALL_CORE_FT
PRINCIPAL DISCHARGE DIAGNOSIS  Diagnosis: DDD (degenerative disc disease), thoracolumbar  Assessment and Plan of Treatment:

## 2025-06-12 NOTE — DISCHARGE NOTE PROVIDER - NSDCFUSCHEDAPPT_GEN_ALL_CORE_FT
Haydee Bernstein  [facility] Physician [facility]  [address]STR[address]  Scheduled Appointment: 06/30/2025    Kaylee Mak  [facility]  ORTHOSURG [address]  Scheduled Appointment: 07/01/2025    Camilo Warren  [facility]  [address]ULMMED [address]  Scheduled Appointment: 08/21/2025     Haydee Bernstein  [facility] Physician [facility]  [address]  Scheduled Appointment: 06/30/2025    Kaylee Mak  [facility]  ORTHOSURG [address]  Scheduled Appointment: 07/01/2025    Camilo Warren  [facility] Physician [facility]  [address]ULMMED [address]  Scheduled Appointment: 08/21/2025    Artur Jacobo  [facility] Physician [facility]  ENDOCRIN [address]  Scheduled Appointment: 09/15/2025

## 2025-06-12 NOTE — PROGRESS NOTE ADULT - SUBJECTIVE AND OBJECTIVE BOX
Patient is a 75y old  Female who presents with a chief complaint of SDA (12 Jun 2025 04:16)    Interval history:  EEG in progress      REVIEW OF SYSTEMS  weakness    PAST MEDICAL & SURGICAL HISTORY  H/O seasonal allergies    History of pulmonary embolism    Restless leg syndrome    GERD (gastroesophageal reflux disease)    HTN (hypertension)    OA (osteoarthritis)    Fungal infection of skin    H/O scoliosis    Essential hypertension    Depression    Osteoporosis    Right hip pain    2019 novel coronavirus disease (COVID-19)    Hiatal hernia    History of chronic pain    Peripheral neuropathy    Chronic constipation    Chronic GERD    Closed fracture of thoracic vertebra    MSSA (methicillin susceptible Staphylococcus aureus) infection    SILVANA on CPAP    Paroxysmal atrial fibrillation    PFO (patent foramen ovale)    Hearing loss    Postlaminectomy syndrome    Delayed gastric emptying    Anemia    DM2 (diabetes mellitus, type 2)    S/P repair of paraesophageal hernia    S/P spinal fusion    Scoliosis    S/P spinal surgery    Removal of pin, plate, abigail, or screw    S/P hysterectomy    S/P hip replacement    S/P shoulder surgery    S/P dilatation and curettage    H/O arthroscopy of knee    H/O total knee replacement, right    S/P cataract surgery    History of bilateral hip replacements    History of repair of hiatal hernia     CURRENT FUNCTIONAL STATUS  Bed Mobility  Bed Mobility Training Rehab Potential: good, to achieve stated therapy goals  Bed Mobility Training Symptoms Noted During/After Treatment: none  Bed Mobility Training Rolling/Turning: maximum assist (25% patient effort);  1 person assist;  verbal cues  Bed Mobility Training Sit-to-Supine: maximum assist (25% patient effort);  1 person assist;  verbal cues;  bed rails  Bed Mobility Training Supine-to-Sit: maximum assist (25% patient effort);  1 person assist;  verbal cues;  bed rails  Bed Mobility Training Limitations: decreased ability to use legs for bridging/pushing;  impaired ability to control trunk for mobility;  decreased strength;  impaired coordination;  impaired motor control    Therapeutic Exercise  Therapeutic Exercise Rehab Effort: good  Therapeutic Exercise Symptoms Noted During/After Treatment: none  Therapeutic Exercise Detail: Seated weight shifts anterior and posterior x10           FAMILY HISTORY   Family history of abdominal aortic aneurysm (AAA) (Mother)        RECENT LABS/IMAGING  < from: CT Head No Cont (06.11.25 @ 13:19) >    ACC: [id number] EXAM:  CT BRAIN   ORDERED BY: GOMEZ EVANS DATE:  06/11/2025          INTERPRETATION:  CLINICAL INDICATION: Right lower extremity weakness,   speech issues    COMPARISON: CT head 6/11/2025, MRI brain 6/9/2025    TECHNIQUE: Axial noncontrast CT images of the head were obtained.   Sagittal and coronal reformatted images were provided. Bone and soft   tissue windows were evaluated.    FINDINGS:  Bilateral occipital infarcts, not as well visualized on current study   compared to prior exam earlier same day at 3:49 AM, likely related to   differences in technique. No hemorrhagic transformation. No significant   mass effect. Bilateral thalamic infarcts are grossly unchanged.    No extra-axial collection. Ventriclesand sulci are normal in size for   the patient's age without hydrocephalus.  Basal cisterns are patent.    Visualized paranasal sinuses are clear.  Tympanomastoid cavities are   clear. Calvarium is intact.    IMPRESSION:  Known bilateral occipital and thalamic infarcts are not as   well-visualized currently compared to prior study likely due to   differences in technique. No evidence for hemorrhagic transformation.   There is no significant mass effect or shift.    --- End of Report ---          JAMMIE AMBROSE MD; Resident Radiologist  This document has been electronically signed.  SAURABH LING MD; Attending Radiologist  This document has been electronically signed. Jun 11 2025  1:45PM    < end of copied text >    CBC Full  -  ( 12 Jun 2025 06:47 )  WBC Count : 8.65 K/uL  RBC Count : 3.46 M/uL  Hemoglobin : 10.2 g/dL  Hematocrit : 30.9 %  Platelet Count - Automated : 319 K/uL  Mean Cell Volume : 89.3 fL  Mean Cell Hemoglobin : 29.5 pg  Mean Cell Hemoglobin Concentration : 33.0 g/dL  Auto Neutrophil # : x  Auto Lymphocyte # : x  Auto Monocyte # : x  Auto Eosinophil # : x  Auto Basophil # : x  Auto Neutrophil % : x  Auto Lymphocyte % : x  Auto Monocyte % : x  Auto Eosinophil % : x  Auto Basophil % : x    06-12    136  |  100  |  21  ----------------------------<  116[H]  3.8   |  25  |  0.68    Ca    8.7      12 Jun 2025 06:47  Phos  3.3     06-12  Mg     2.00     06-12      Urinalysis Basic - ( 12 Jun 2025 06:47 )    Color: x / Appearance: x / SG: x / pH: x  Gluc: 116 mg/dL / Ketone: x  / Bili: x / Urobili: x   Blood: x / Protein: x / Nitrite: x   Leuk Esterase: x / RBC: x / WBC x   Sq Epi: x / Non Sq Epi: x / Bacteria: x        VITALS  T(C): 37.1 (06-12-25 @ 07:45), Max: 37.1 (06-11-25 @ 16:58)  HR: 85 (06-12-25 @ 07:45) (85 - 98)  BP: 121/69 (06-12-25 @ 07:45) (115/57 - 132/71)  RR: 18 (06-12-25 @ 07:45) (18 - 18)  SpO2: 98% (06-12-25 @ 07:45) (95% - 98%)  Wt(kg): --    ALLERGIES  Dilantin (Urticaria)  penicillins (Urticaria)  erythromycin (Stomach Upset; Vomiting; Nausea)      MEDICATIONS   acetaminophen     Tablet .. 650 milliGRAM(s) Oral every 6 hours PRN  apixaban 5 milliGRAM(s) Oral every 12 hours  atorvastatin 40 milliGRAM(s) Oral at bedtime  bacitracin   Ointment 1 Application(s) Topical daily  chlorhexidine 2% Cloths 1 Application(s) Topical daily  cyclobenzaprine 10 milliGRAM(s) Oral two times a day  insulin lispro (ADMELOG) corrective regimen sliding scale   SubCutaneous Before meals and at bedtime  metoprolol tartrate 100 milliGRAM(s) Oral two times a day  naloxone Injectable 0.1 milliGRAM(s) IV Push every 3 minutes PRN  ondansetron Injectable 4 milliGRAM(s) IV Push every 6 hours PRN  oxyCODONE    IR 5 milliGRAM(s) Oral every 6 hours PRN  oxyCODONE    IR 10 milliGRAM(s) Oral every 6 hours PRN  pantoprazole  Injectable 40 milliGRAM(s) IV Push daily  polyethylene glycol 3350 17 Gram(s) Oral daily  pramipexole 1.25 milliGRAM(s) Oral three times a day  pregabalin 100 milliGRAM(s) Oral two times a day  senna 2 Tablet(s) Oral at bedtime  sodium chloride 0.9% lock flush 3 milliLiter(s) IV Push every 8 hours      ----------------------------------------------------------------------------------------     PHYSICAL EXAM  Constitutional - NAD, Comfortable  HEENT - EEG in progress   Chest - no respiratory distress   Cardiovascular - RRR, S1S2   Abdomen -  Soft, NTND  Extremities -  No calf tenderness   Neurologic Exam -  tongue deviation to the right                  Cognitive - Awake, Alert, AAO to self, place (hospital, but not which one), month and year     Communication - fluent but reports word finding difficulty     Motor -                      LEFT    UE - 5/5                    RIGHT UE - 4+/5                    LEFT    LE - HF 3/5, KE 2/5                     RIGHT LE - HF 2/5, KE 2/5          Balance - WNL Static  Psychiatric - Mood stable, Affect WNL  ----------------------------------------------------------------------------------------  ASSESSMENT/PLAN  74 yo f s/p T4-pelvis post lami, PSF on 6/4  seen by neurology for occipital cva seen on mri, code stroke overnight for RLE weakness, repeat ct head pending  PT for bed mobility, transfers, ambulation as tolerated  out of bed to chair as tolerated   OT for adls  Pain -oxy ir prn  DVT PPX - lovenox  Dispo- required more assistance with PT yesterday.  EEG in progress. Previously recommended for acute rehab, however now with change in symptoms. will monitor for continued participation and improvement with bedside therapy.   Goal is for acute rehab  Total time spent to review relevant records and imaging results, examine patient, complete documentation, and when applicable discuss the case with the patient, family, , social workers, and medical team:  35 minutes

## 2025-06-12 NOTE — DISCHARGE NOTE PROVIDER - NSDCFUADDINST_GEN_ALL_CORE_FT
Detail Level: Zone - You had surgery on 6/4/25. The surgery you had was T4-pelvis laminectomy/ posterior fusion w/ L4 pedicle osteotomy    - Remove bandage from incision site on post op day 3 if it was not removed by the surgical team prior to discharge. Once removed, incision site does not need a bandage or ointment on it. If you have steri strips (small, skinny beige strips), they will eventually fall off over time. Do not pull at steri strips. If steri strips are more than care home off, you may remove them. Do not touch or scratch incision to prevent infection.    - If your incision is closed with clear sutures, these are absorbable and will dissolve over time. If you see metallic staples or black stitches, these will need to be removed in the office 7-14 days after surgery. Your surgeon will tell you at your follow up appt when your sutures/staples will be removed, if applicable.  Do not pick or scratch at incision.     - Shower daily with shampoo/soap on post operative day 4 (DATE:6/8/25 ) Avoid long soaks and do not submerge incision in water (no baths.) Allow soap and water to run over the incision. Pat incision area dry with clean towel- do not scrub. Please shower regularly to ensure incision stays clean to avoid post operative infections.  You may have a body shower daily, as long as your head incision is covered by a shower cap and does not get wet until post op day 4.     - Notify your surgeon if you notice increased redness, drainage or your incision area opening.     - Return to ER immediately for high fevers, severe headache, vomiting, lethargy or weakness    - Please call your neurosurgeon following discharge to make follow up appointment in 1 week after discharge unless otherwise specified. See contact information.        - Ambulate as tolerate. Continue with all "activities of daily living." Avoid strenuous activity or heavy lifting until cleared for additional activity at your follow up appointment. You cannot drive while taking narcotics (oxycodone, valium, etc.)     - Do not return to work or school until cleared by your neurosurgeon at your follow up visit unless specified to you during your hospital stay    - Do not take any blood thinning medications such as aspirin, motrin, ibuprofen, warfarin, coumadin, plavix, heparin, lovenox, Xarelto, Eliquis etc. until cleared by your neurosurgeon    - Surgery, anesthesia, and pain medications can cause constipation. Please take over the counter stool softeners daily until regular bowel movements are achieved. Examples include Miralax, Senna, Colace, Milk of Magnesia, or Dulcolax suppositories. Please consult drug store pharmacist or pediatrician if there are question about dosing if the patient is pediatric.

## 2025-06-12 NOTE — PROGRESS NOTE ADULT - PROBLEM SELECTOR PLAN 1
- out pt MRI subacute moderate anterior wedge compression fracture of the T9 vertebral body w/ T8-T9 there is moderate to severe bilateral neural foraminal narrowing and flattening of the ventral thecal sac with contacting the cord which is progressed.   - 6/4 s/p T3-pelvis with L4 pedicle osteotomy and T3-pelvis fusion w/ Myocutaneous flap closure with plastic surgery.   - on chronic suppression as outpt cefadroxil management as per primary   - pain control as per primary with bowel regimen   - wound care as per primary.  - PMR inpt AR

## 2025-06-12 NOTE — EEG REPORT - NS EEG TEXT BOX
[facility]   COMPREHENSIVE EPILEPSY CENTER   REPORT OF ROUTINE VIDEO EEG     [facility]: [address], [place] [address], Ph#: [phone]  [facility]: [address] [place] [address], Ph#: [phone]  [facility]: [address] [place] [address], Ph#: [phone]    Patient Name: VIRGINIA HUFFMAN  Age and : 75y (46)  MRN #: [id number]  Location: [place] [address] [address]  Referring Physician: Eitan Stroud    Duration: 34 minutes    _____________________________________________________________  STUDY INFORMATION    EEG Recording Technique:  The patient underwent continuous Video-EEG monitoring, using Telemetry System hardware on the XLTek Digital System. EEG and video data were stored on a computer hard drive with important events saved in digital archive files. The material was reviewed by a physician (electroencephalographer / epileptologist) on a daily basis. Sergio and seizure detection algorithms were utilized and reviewed. An EEG Technician attended to the patient, and was available throughout daytime work hours.  The epilepsy center neurologist was available in person or on call 24-hours per day.    EEG Placement and Labeling of Electrodes:  The EEG was performed utilizing 20 channel referential EEG connections (coronal over temporal over parasagittal montage) using all standard 10-20 electrode placements with EKG, with additional electrodes placed in the inferior temporal region using the modified 10-10 montage electrode placements for elective admissions, or if deemed necessary. Recording was at a sampling rate of 256 samples per second per channel. Time synchronized digital video recording was done simultaneously with EEG recording. A low light infrared camera was used for low light recording.     _____________________________________________________________  HISTORY    Patient is a 75y old  Female who presents with a chief complaint of SDA (2025 04:16)      PERTINENT MEDICATION:  MEDICATIONS  (STANDING):  apixaban 5 milliGRAM(s) Oral every 12 hours  atorvastatin 40 milliGRAM(s) Oral at bedtime  bacitracin   Ointment 1 Application(s) Topical daily  chlorhexidine 2% Cloths 1 Application(s) Topical daily  cyclobenzaprine 10 milliGRAM(s) Oral two times a day  insulin lispro (ADMELOG) corrective regimen sliding scale   SubCutaneous Before meals and at bedtime  metoprolol tartrate 100 milliGRAM(s) Oral two times a day  pantoprazole  Injectable 40 milliGRAM(s) IV Push daily  polyethylene glycol 3350 17 Gram(s) Oral daily  pramipexole 1.25 milliGRAM(s) Oral three times a day  pregabalin 100 milliGRAM(s) Oral two times a day  senna 2 Tablet(s) Oral at bedtime  sodium chloride 0.9% lock flush 3 milliLiter(s) IV Push every 8 hours    _____________________________________________________________  STUDY INTERPRETATION      FINDINGS:      Background:  Continuity: continuous  Symmetry: symmetric  PDR: 9 Hz activity, with amplitude to 40 uV, that attenuated to eye opening.  Low amplitude frontal beta noted in wakefulness.  Reactivity: present  Voltage: normal, mostly 20-150uV  Anterior Posterior Gradient: present  Other background findings: none  Breach: absent    Background Slowing:  Generalized slowing: mild-moderate, with mixed alpha, theta, and, delta activity during wakefulness.  Focal slowing: intermittent frontal and generalized rhythmic delta activity (FIRDA/GRDA).    State Changes:   -Drowsiness noted with increased slowing, attenuation of fast activity, vertex transients.  -Present with N2 sleep transients with symmetric spindles and K-complexes.    Sporadic Epileptiform Discharges:    None    Rhythmic and Periodic Patterns (RPPs):  None     Electrographic and Electroclinical seizures:  None    Other Clinical Events:  None    Activation Procedures:   -Hyperventilation was not performed.   -Photic stimulation was performed and did not elicit any abnormalities.      Artifacts:  Intermittent myogenic and movement artifacts were noted.    ECG:  The heart rate on single channel ECG was predominantly 80-90 bpm.    Summary:  Abnormal EEG in the awake / drowsy / asleep state(s).  Mild-moderate generalized background slowing with intermixed frontal intermittent rhythmic delta activity (FIRDA) and generalized rhythmic delta activity (GRDA).    Clinical Impression:  Moderate degree of diffuse cerebral dysfunction.  There were no epileptiform abnormalities recorded.        -------------------------------------------------------------------------------------------------------    Jes Olsen MD  Director, Epilepsy - [facility]    Questions please call [phone]  After hours (5 PM - 8:30 AM) please call the epilepsy answering service at [phone]

## 2025-06-12 NOTE — DISCHARGE NOTE PROVIDER - HOSPITAL COURSE
74 y/o female PMH of HTN, SILVANA, PAF on Eliquis PE (in 1980s) OA, hiatal hernia (repair 2023) gastroparesis, peripheral neuropathy, SILVANA on CPAP, and extensive prior spinal surgery (last in 09/2024-T9 partial corpectomy, had paraspinal abscess drained in 10/2024) presented as SDA for T3 to pelvis fusion and instrumention with L4 PSO on 6/4/25.     6/4 OR for T3 to pelvis fusion and instrumention with L4 PSO, with Dr. Stroud (neurosurgery), Dr. Gray (ortho), and Dr. Bolton (plastics), closed with nylons. 4uPRBCs given intraop, has tim bui, drains per plastics. Low pressures at end of case, pt required increasing phenylephrine intraop. 500 cc LR bolus given for increasing vasopressor requirements and elevated lactate.     6/5 POD 1, intubated and sedated (partially weaned for exam), MAEx4 spont under wrist restraints, incision c/d/i. On ancef 24h postop. Postop Hg 11.0, ramya /70.  6/7 POD 3,  H/H 8.2/23.4, trending cbc every 12h as long as patient is hemodynamically stable, hypokalemic this morning to 2.9 given potassium chloride tablets for repletion. On exam, Ms. French is more alert to person and place.   6/8 POD 4, H/H stable, exam improving. Deferring MRI for now as exam is improving and less concern for stroke. Daily H/H to decrease blood draws.   6/9 POD 5, Pending repeat H/H, now on floor, lower half of dressing saturated so dressing replaced.   6/10: pod 6, H&H stable, mri brain  Acute/subacute bilateral PCA distribution infarcts with associated, neurology called, drains per plastics (2 drains d'cd)  6/11 POD7, H&H stable, RT lower drain per plastics, Rapid response and code stroke called 2/2 worse AMS, CTH/CTA head/neck done, b/l PCA infarcts similar to MRI findings, no new acute findings. Neuro following. Subsequent episode of aphasia, rpt CTH stable. Restarted on eliquis 5mg BID and metoprolol 100mg BID.   6/12 POD 8, exam improved, no aphasia, pending CBC, RADHA drain dc'd. cont eliquis and metoprolol. pending EEG   Na136, H&H stable, RADHA drain discontinued by plastics. 76 y/o female PMH of HTN, SILVANA, PAF on Eliquis PE (in 1980s) OA, hiatal hernia (repair 2023) gastroparesis, peripheral neuropathy, SILVANA on CPAP, and extensive prior spinal surgery (last in 09/2024-T9 partial corpectomy, had paraspinal abscess drained in 10/2024) presented as SDA for T3 to pelvis fusion and instrumention with L4 PSO on 6/4/25.     6/4 OR for T3 to pelvis fusion and instrumention with L4 PSO, with Dr. Stroud (neurosurgery), Dr. Gray (ortho), and Dr. Bolton (plastics), closed with nylons. 4uPRBCs given intraop, has tim bui, drains per plastics. Low pressures at end of case, pt required increasing phenylephrine intraop. 500 cc LR bolus given for increasing vasopressor requirements and elevated lactate.     6/5 POD 1, intubated and sedated (partially weaned for exam), MAEx4 spont under wrist restraints, incision c/d/i. On ancef 24h postop. Postop Hg 11.0, ramya /70.  6/7 POD 3,  H/H 8.2/23.4, trending cbc every 12h as long as patient is hemodynamically stable, hypokalemic this morning to 2.9 given potassium chloride tablets for repletion. On exam, Ms. French is more alert to person and place.   6/8 POD 4, H/H stable, exam improving. Deferring MRI for now as exam is improving and less concern for stroke. Daily H/H to decrease blood draws.   6/9 POD 5, Pending repeat H/H, now on floor, lower half of dressing saturated so dressing replaced.   6/10: pod 6, H&H stable, mri brain  Acute/subacute bilateral PCA distribution infarcts with associated, neurology called, drains per plastics (2 drains d'cd)  6/11 POD7, H&H stable, RT lower drain per plastics, Rapid response and code stroke called 2/2 worse AMS, CTH/CTA head/neck done, b/l PCA infarcts similar to MRI findings, no new acute findings. Neuro following. Subsequent episode of aphasia, rpt CTH stable. Restarted on eliquis 5mg BID and metoprolol 100mg BID.   6/12 POD 8, exam improved, no aphasia, pending CBC, RADHA drain dc'd. cont eliquis and metoprolol. pending EEG   Na136, H&H stable, RADHA drain discontinued by plastics.   6/13 POD 9  EEG neg for seizures, lump noted on the upper right back near incision, non tender to palpation will - will obtain CT thoracic spine. Changed dressing.   6/14 POD 10 pending YEIMI. CT thoracic performed revealing presence of postsurgical edema/seroma, will continue to monitor.  6/14 POD 11, exam stable, pending YEIMI.   6/16 exam, stable, tachycardia to 120's o/n ekg done stable sinus tachy, pending YEIMI       74 y/o female PMH of HTN, SILVANA, PAF on Eliquis PE (in 1980s) OA, hiatal hernia (repair 2023) gastroparesis, peripheral neuropathy, SILVANA on CPAP, and extensive prior spinal surgery (last in 09/2024-T9 partial corpectomy, had paraspinal abscess drained in 10/2024) presented as SDA for T3 to pelvis fusion and instrumention with L4 PSO on 6/4/25.     6/4 OR for T3 to pelvis fusion and instrumention with L4 PSO, with Dr. Stroud (neurosurgery), Dr. Gray (ortho), and Dr. Bolton (plastics), closed with nylons. 4uPRBCs given intraop, has tim bui, drains per plastics. Low pressures at end of case, pt required increasing phenylephrine intraop. 500 cc LR bolus given for increasing vasopressor requirements and elevated lactate.     6/5 POD 1, intubated and sedated (partially weaned for exam), MAEx4 spont under wrist restraints, incision c/d/i. On ancef 24h postop. Postop Hg 11.0, ramya /70.  6/7 POD 3,  H/H 8.2/23.4, trending cbc every 12h as long as patient is hemodynamically stable, hypokalemic this morning to 2.9 given potassium chloride tablets for repletion. On exam, Ms. French is more alert to person and place.   6/8 POD 4, H/H stable, exam improving. Deferring MRI for now as exam is improving and less concern for stroke. Daily H/H to decrease blood draws.   6/9 POD 5, Pending repeat H/H, now on floor, lower half of dressing saturated so dressing replaced.   6/10: pod 6, H&H stable, mri brain  Acute/subacute bilateral PCA distribution infarcts with associated, neurology called, drains per plastics (2 drains d'cd)  6/11 POD7, H&H stable, RT lower drain per plastics, Rapid response and code stroke called 2/2 worse AMS, CTH/CTA head/neck done, b/l PCA infarcts similar to MRI findings, no new acute findings. Neuro following. Subsequent episode of aphasia, rpt CTH stable. Restarted on eliquis 5mg BID and metoprolol 100mg BID.   6/12 POD 8, exam improved, no aphasia, pending CBC, RADHA drain dc'd. cont eliquis and metoprolol. pending EEG   Na136, H&H stable, RADHA drain discontinued by plastics.   6/13 POD 9  EEG neg for seizures, lump noted on the upper right back near incision, non tender to palpation will - will obtain CT thoracic spine. Changed dressing.   6/14 POD 10 pending YEIMI. CT thoracic performed revealing presence of postsurgical edema/seroma, will continue to monitor.  6/14 POD 11, exam stable, pending YEIMI.   6/16 exam, stable, tachycardia to 120's o/n ekg done stable sinus tachy, pending YEIMI  6/17 exam stable, d/c planning to rehab

## 2025-06-12 NOTE — PROGRESS NOTE ADULT - PROBLEM SELECTOR PLAN 1
- Neurochecks q4  - TLSO when OOB  - Daily CBC/BMP  - Standing XR when able  - Incentive spirometry  - DVT ppx   - Bowel regimen  - cont eliquis 5mg BID and metoprolol 100mg BID   - EEG    Pager [phone]   Case discussed with attending neurosurgeon Dr. Stroud

## 2025-06-12 NOTE — PROGRESS NOTE ADULT - ASSESSMENT
74 yo F w/ Hx HTN, PAF on eliquis, multiple spine surgery last complicated by paraspinal abscess and chronic antibiotics suppression p/f spine surgery after twisting her back and found to have a T9 compression fracture c/b post operative blood loss anemia and CVA

## 2025-06-12 NOTE — CHART NOTE - NSCHARTNOTEFT_GEN_A_CORE
SOURCE: [x] Patient [x] Medical record [x] RN/PCA   Diet rx: Regular (06-08-25 @ 10:40) [Active]    Medical Course: Pt 74 y/o female with PMHx of HTN, SILVAAN, PAF, PE (in 1980s) OA, hiatal hernia (repair 2023) gastroparesis, peripheral neuropathy, SILVANA on CPAP, and extensive prior spinal surgery (last in 09/2024-T9 partial corpectomy, had paraspinal abscess drained in 10/2024) presented for T3 to pelvis fusion and instrumention with L4 PSO on 6/4/25.     Nutrition Course: At time of visit, Pt awake, alert; appears weak. Per Pt, her appetite good. Pt partially edentulous; but Pt denies having any chewing difficulty. No report of swallowing difficulty either. No c/o nausea, vomiting or diarrhea @ this time. +Last BM (6/10) per flow sheet. Food preferences discussed with Pt. Better food options briefly discussed as well.        Pertinent Medications: Lipitor, Protonix, Senna, Miralax, Zofran (PRN), Insulin (Lispro),    Pertinent Labs:                       10.2   8.65  )-----------( 319      ( 12 Jun 2025 06:47 )             30.9   06-12  136  |  100  |  21  ----------------------------<  116[H]  3.8   |  25  |  0.68  Ca    8.7      12 Jun 2025 06:47  Phos  3.3     06-12  Mg     2.00     06-12  CAPILLARY BLOOD GLUCOSE  POCT Blood Glucose.: 134 mg/dL (12 Jun 2025 12:13)  POCT Blood Glucose.: 134 mg/dL (12 Jun 2025 08:09)  POCT Blood Glucose.: 140 mg/dL (11 Jun 2025 21:18)  POCT Blood Glucose.: 108 mg/dL (11 Jun 2025 17:32)  (6/10) HbA1c 5.2%; phosphorus 2.4 L, Triglycerides 215 H, HL 29 L     Pt's height: 64"/162.56 cm (6/4)   Pt's weights: 199.5#/90.5 kg (daily - 6/8/25), 195.1#/88.5 kg (dosing - 6/4/25)   Weight assessment: indicative of weight gain of 4.4# in 4 days -> ?question accuracy? -> could be related to fluid status   IBW: 120#/54.43kg +/-10%   BMI: 33.48 kg/m^2     Physical assessment (per flow-sheets):   Edema: 1+: generalized  skin integrity: +skin tears (multiple areas): R/L cheek, R/L shoulder      Estimated Needs: [x] recalculated, based on ideal body weight: 120 lbs /54.43  kg   estimated energy needs:  1633 - 1905 kcal daily @ 30-35 kcal/kg    estimated protein needs: 71 - 82 gm protein daily @ 1.3-1.5 gm/kg      Previous Nutrition Diagnosis:  [x] Increased Nutrient Needs    [x] Inadequate Protein Energy Intake  Nutrition Diagnosis is [x] ongoing   New Nutrition Diagnosis: [x] not applicable    Education: [x] better food options briefly discussed with Pt     Nutrition Interventions/Recommendations:  1. Encourage & assist Pt with meals as needed; Monitor PO diet tolerance;     2. Add PO supplement: Ensure Max 330 ml (150 kcal, 30 gm protein) - 1x daily;   3. Add Multivitamins 1 tab daily for micronutrient coverage;  4. Obtain daily weights & monitor weight trends;   5. Monitor & Evaluate: PO intake, tolerance to diet/supplement; nutrition related lab values; BMs/GI distress; hydration status; skin integrity;

## 2025-06-12 NOTE — DISCHARGE NOTE PROVIDER - NSDCACTIVITY_GEN_ALL_CORE
Follow Instructions Provided by your Surgical Team Bathing allowed/Showering allowed/Walking - Indoors allowed/No heavy lifting/straining/Walking - Outdoors allowed/Follow Instructions Provided by your Surgical Team

## 2025-06-12 NOTE — DISCHARGE NOTE PROVIDER - NSDCFUADDAPPT_GEN_ALL_CORE_FT
APPTS ARE READY TO BE MADE: [x] YES    Best Family or Patient Contact (if needed):    Additional Information about above appointments (if needed):    1: neurosurgery  2: plastic surgery  3:     Other comments or requests:    APPTS ARE READY TO BE MADE: [x] YES    Best Family or Patient Contact (if needed):    Additional Information about above appointments (if needed):    1: neurosurgery  2: plastic surgery  3:     Other comments or requests:     Patient still admitted, was outreached but did not answer. A voicemail was left for the patient to return our call.   APPTS ARE READY TO BE MADE: [x] YES    Best Family or Patient Contact (if needed):    Additional Information about above appointments (if needed):    1: neurosurgery  2: plastic surgery  3:     Other comments or requests:     Patient is being transferred. Caregiver will arrange follow up.

## 2025-06-12 NOTE — PROGRESS NOTE ADULT - ASSESSMENT
74 y/o female PMH of HTN, SILVANA, PAF on Eliquis PE (in 1980s) OA, hiatal hernia (repair 2023) gastroparesis, peripheral neuropathy, SILVANA on CPAP, and extensive prior spinal surgery, admitted s/p T4-Pelvis posterior laminectomy and fusion. Neurology consulted for MRI Brain finding of acute/subacute bilateral occipital infarcts. Patient is unaware she had a stroke and denies any recent changes in vision, weakness, numbness, or speech changes. Patient has a history of paroxysmal afib but was taken off of eliquis in a few months ago since she reported did not have anymore episodes of afib.      mRS: 2  LKN: Unknown  NIHSS: 1    Not a tenecteplase candidate due to out of window.  Not a mechanical thrombectomy candidate due to no LVO.    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     Recommendations:  [] Will need to restart eliquis 5mg BID when medically able  [] TTE w/o bubble   [] Lipid panel, HbA1c  [] Lovenox SQ and SCDs for DVT prophylaxis   [] BP goal: normotensive to 140/80  [] Telemonitoring  [] Stroke neurological checks and vital signs Q4  [] BGM goals 140-180  [] PT/OT; S/S evaluation    Case and plan not final until Attending attestation     76 y/o female PMH of HTN, SILVANA, PAF on Eliquis PE (in 1980s) OA, hiatal hernia (repair 2023) gastroparesis, peripheral neuropathy, SILVANA on CPAP, and extensive prior spinal surgery, admitted s/p T4-Pelvis posterior laminectomy and fusion. Neurology consulted for MRI Brain finding of acute/subacute bilateral occipital infarcts. Patient is unaware she had a stroke and denies any recent changes in vision, weakness, numbness, or speech changes. Patient has a history of paroxysmal afib but was taken off of eliquis in a few months ago since she reported did not have anymore episodes of afib.      mRS: 2  LKN: Unknown  NIHSS: 1    Not a tenecteplase candidate due to out of window.  Not a mechanical thrombectomy candidate due to no LVO.    Impression: Neurologically improved with mental status since 6/11. R inferior quadrantanopia due to bilateral occipital infarcts, mechanism likely cardioembolic in setting of Afib no on AC.     Recommendations:  [] Follow up official EEG report   [x] eliquis 5mg BID started on 6/11  [x] TTE w/o bubble   [x] LDL 69, HbA1c 5.2  [x] Lovenox SQ and SCDs for DVT prophylaxis   [x] BP goal: normotensive to 140/80  [x] Telemonitoring  [x] Stroke neurological checks and vital signs Q4  [x] PT/OT - inpatient rehab @ [facility]   [x] Regular diet as tolerated    [] Patient should follow up with Stroke NP, Mala Hopper or Natalya Michael, in clinic at [address] [place] [address], [phone]. Please email [email or web address]/ basic [email or web address].   [] From neurovascular standpoint patient cleared for discharge     Patient seen and examined with stroke attending and team at bedside.    Please call with questions: [phone]

## 2025-06-12 NOTE — DISCHARGE NOTE PROVIDER - CARE PROVIDERS DIRECT ADDRESSES
,[email or web address]

## 2025-06-12 NOTE — DISCHARGE NOTE PROVIDER - PROVIDER TOKENS
PROVIDER:[TOKEN:[[id number]],FOLLOWUP:[1 week]],PROVIDER:[TOKEN:[[id number]],FOLLOWUP:[1 week]] PROVIDER:[TOKEN:[[id number]],FOLLOWUP:[1 week]],PROVIDER:[TOKEN:[[id number]],FOLLOWUP:[1 week]],PROVIDER:[TOKEN:[[id number]]]

## 2025-06-12 NOTE — PROGRESS NOTE ADULT - ASSESSMENT
VIRGINIA HUFFMAN is a 75yFemale s/p PSF with closure 6/4    - RADHA dc'd  - Gauze/tegaderm dressing  - Nylons to remain on until followup with Dr. Bolton  - Pain control  - Advance diet as tolerated, ensure pt taking adequate protein  - Appreciate rest of care per primary team    Plastic Surgery  [facility]: [phone]  [facility]: [phone]

## 2025-06-12 NOTE — DISCHARGE NOTE PROVIDER - CARE PROVIDER_API CALL
Eitan Stroud  Neurosurgery  [address]  [place] [address]  Phone: [phone]  Fax: [phone]  Follow Up Time: 1 week    Emile Bolton  Plastic Surgery  [address], [address]  [place] [address]  Phone: [phone]  Fax: [phone]  Follow Up Time: 1 week   Eitan Stroud  Neurosurgery  [address]  [place] [address]  Phone: [phone]  Fax: [phone]  Follow Up Time: 1 week    Emile Bolton  Plastic Surgery  [address], [address]  [place] [address]  Phone: [phone]  Fax: [phone]  Follow Up Time: 1 week    Lj Gary)  Spine Surgery  [address], [address]  [place] [address]  Phone: [phone]  Fax: [phone]  Follow Up Time:

## 2025-06-12 NOTE — DISCHARGE NOTE PROVIDER - NSDCMRMEDTOKEN_GEN_ALL_CORE_FT
apixaban 5 mg oral tablet: 1 tab(s) orally every 12 hours last dose on 5/31/25  apixaban 5 mg oral tablet: 1 tab(s) orally every 12 hours  atorvastatin 40 mg oral tablet: 1 tab(s) orally once a day (at bedtime)  chlorthalidone 25 mg oral tablet: 1 tab(s) orally once a day  Dulcolax 1 tab orally once a day at bedtime:   ferrous sulfate 325 mg (65 mg elemental iron) oral tablet: 1 tab(s) orally once a day (at bedtime)  Flexeril 10 mg oral tablet: 1 tab(s) orally 2 times a day  Lyrica 100 mg oral capsule: 1 cap(s) orally 2 times a day  magnesium 3 tabs orally at bedtime:   metoprolol tartrate 100 mg oral tablet: 1 tab(s) orally 2 times a day  oxyCODONE 5 mg oral tablet: 1 tab(s) orally every 6 hours As needed Moderate Pain (4 - 6)  pantoprazole 40 mg intravenous injection: 40 milligram(s) intravenous once a day  polyethylene glycol 3350 oral powder for reconstitution: 17 gram(s) orally once a day  pramipexole 3.75 mg oral tablet, extended release: 1 tab(s) orally once a day (at bedtime)  senna leaf extract oral tablet: 2 tab(s) orally once a day (at bedtime)  Tylenol 500 mg oral tablet: 2 tab(s) orally 2 times a day  Voquezna 10 mg oral tablet: 1 tab(s) orally once a day   apixaban 5 mg oral tablet: 1 tab(s) orally every 12 hours  atorvastatin 40 mg oral tablet: 1 tab(s) orally once a day (at bedtime)  chlorthalidone 25 mg oral tablet: 1 tab(s) orally once a day  Dulcolax 1 tab orally once a day at bedtime:   ferrous sulfate 325 mg (65 mg elemental iron) oral tablet: 1 tab(s) orally once a day (at bedtime)  Flexeril 10 mg oral tablet: 1 tab(s) orally 2 times a day  Lyrica 100 mg oral capsule: 1 cap(s) orally 2 times a day  magnesium 3 tabs orally at bedtime:   metoprolol tartrate 100 mg oral tablet: 1 tab(s) orally 2 times a day  naloxone: 0.1 milliliter(s) orally once as needed for overdose  oxyCODONE 10 mg oral tablet: 1 tab(s) orally every 6 hours As needed Severe Pain (7 - 10)  oxyCODONE 5 mg oral tablet: 1 tab(s) orally every 6 hours As needed Moderate Pain (4 - 6)  pantoprazole 40 mg intravenous injection: 40 milligram(s) intravenous once a day  polyethylene glycol 3350 oral powder for reconstitution: 17 gram(s) orally once a day  pramipexole 3.75 mg oral tablet, extended release: 1 tab(s) orally once a day (at bedtime)  senna leaf extract oral tablet: 2 tab(s) orally once a day (at bedtime)  Tylenol 500 mg oral tablet: 2 tab(s) orally 2 times a day  Voquezna 10 mg oral tablet: 1 tab(s) orally once a day   apixaban 5 mg oral tablet: 1 tab(s) orally every 12 hours  atorvastatin 40 mg oral tablet: 1 tab(s) orally once a day (at bedtime)  chlorthalidone 25 mg oral tablet: 1 tab(s) orally once a day  Dulcolax 1 tab orally once a day at bedtime:   ferrous sulfate 325 mg (65 mg elemental iron) oral tablet: 1 tab(s) orally once a day (at bedtime)  Flexeril 10 mg oral tablet: 1 tab(s) orally 2 times a day  Lyrica 100 mg oral capsule: 1 cap(s) orally 2 times a day  magnesium 3 tabs orally at bedtime:   metoprolol tartrate 100 mg oral tablet: 1 tab(s) orally 2 times a day  naloxone: 0.1 milliliter(s) orally once as needed for overdose  oxyCODONE 10 mg oral tablet: 1 tab(s) orally every 6 hours As needed Severe Pain (7 - 10)  oxyCODONE 5 mg oral tablet: 1 tab(s) orally every 6 hours As needed Moderate Pain (4 - 6)  pantoprazole 40 mg intravenous injection: 40 milligram(s) intravenous once a day  polyethylene glycol 3350 oral powder for reconstitution: 17 gram(s) orally once a day  pramipexole 0.25 mg oral tablet: 5 tab(s) orally 3 times a day  pramipexole 3.75 mg oral tablet, extended release: 1 tab(s) orally once a day (at bedtime)  pregabalin 100 mg oral capsule: 1 cap(s) orally 2 times a day  senna leaf extract oral tablet: 2 tab(s) orally once a day (at bedtime)  Tylenol 500 mg oral tablet: 2 tab(s) orally 2 times a day  Voquezna 10 mg oral tablet: 1 tab(s) orally once a day   apixaban 5 mg oral tablet: 1 tab(s) orally every 12 hours  atorvastatin 40 mg oral tablet: 1 tab(s) orally once a day (at bedtime)  chlorthalidone 25 mg oral tablet: 1 tab(s) orally once a day  Dulcolax 1 tab orally once a day at bedtime:   ferrous sulfate 325 mg (65 mg elemental iron) oral tablet: 1 tab(s) orally once a day (at bedtime)  Flexeril 10 mg oral tablet: 1 tab(s) orally 2 times a day  magnesium 3 tabs orally at bedtime:   metoprolol tartrate 100 mg oral tablet: 1 tab(s) orally 2 times a day  naloxone: 0.1 milliliter(s) orally once as needed for overdose  oxyCODONE 10 mg oral tablet: 1 tab(s) orally every 6 hours As needed Severe Pain (7 - 10)  oxyCODONE 5 mg oral tablet: 1 tab(s) orally every 6 hours As needed Moderate Pain (4 - 6)  pantoprazole 40 mg intravenous injection: 40 milligram(s) intravenous once a day  polyethylene glycol 3350 oral powder for reconstitution: 17 gram(s) orally once a day  pramipexole 0.25 mg oral tablet: 5 tab(s) orally 3 times a day  pramipexole 3.75 mg oral tablet, extended release: 1 tab(s) orally once a day (at bedtime)  pregabalin 100 mg oral capsule: 1 cap(s) orally 2 times a day  senna leaf extract oral tablet: 2 tab(s) orally once a day (at bedtime)  Tylenol 500 mg oral tablet: 2 tab(s) orally 2 times a day  Voquezna 10 mg oral tablet: 1 tab(s) orally once a day

## 2025-06-12 NOTE — PROGRESS NOTE ADULT - SUBJECTIVE AND OBJECTIVE BOX
SUBJECTIVE:     INTERVAL HISTORY:    PAST MEDICAL & SURGICAL HISTORY:  H/O seasonal allergies      History of pulmonary embolism  developed after billings abigail surgery,1984 treated with coumadin 3 months, no issues after      Restless leg syndrome      GERD (gastroesophageal reflux disease)      OA (osteoarthritis)      H/O scoliosis      Essential hypertension      Depression      Osteoporosis      2019 novel coronavirus disease (COVID-19)  Dec 2020- hospitalized for 3 days, did not require home O2, denies residual symptoms      Hiatal hernia      History of chronic pain      Peripheral neuropathy      Chronic constipation      Chronic GERD      Closed fracture of thoracic vertebra      MSSA (methicillin susceptible Staphylococcus aureus) infection      SILVANA on CPAP      Paroxysmal atrial fibrillation      PFO (patent foramen ovale)      Hearing loss      Postlaminectomy syndrome      Delayed gastric emptying      Anemia      DM2 (diabetes mellitus, type 2)      S/P repair of paraesophageal hernia  2001      S/P spinal fusion  L4-L5 1966      Scoliosis  s/p placement of billings abigail x 2 1984      S/P spinal surgery  x 2 1985, 1986      Removal of pin, plate, abigail, or screw  1991- removal of billings abigail      S/P hysterectomy  1982      S/P hip replacement  left (2008)      S/P shoulder surgery  right (2012)      S/P dilatation and curettage  1981      H/O arthroscopy of knee  June 2021      S/P cataract surgery      History of bilateral hip replacements      History of repair of hiatal hernia        FAMILY HISTORY:  Family history of abdominal aortic aneurysm (AAA) (Mother)      SOCIAL HISTORY:   T/E/D:   Occupation:   Lives with:     MEDICATIONS (HOME):  Home Medications:  apixaban 5 mg oral tablet: 1 tab(s) orally every 12 hours (12 Jun 2025 09:34)  chlorthalidone 25 mg oral tablet: 1 tab(s) orally once a day (04 Jun 2025 06:25)  Dulcolax 1 tab orally once a day at bedtime:  (04 Jun 2025 06:25)  Flexeril 10 mg oral tablet: 1 tab(s) orally 2 times a day (04 Jun 2025 06:25)  Lyrica 100 mg oral capsule: 1 cap(s) orally 2 times a day (04 Jun 2025 06:25)  magnesium 3 tabs orally at bedtime:  (04 Jun 2025 06:25)  naloxone: 0.1 milliliter(s) orally once as needed for overdose (12 Jun 2025 09:55)  oxyCODONE 10 mg oral tablet: 1 tab(s) orally every 6 hours As needed Severe Pain (7 - 10) (12 Jun 2025 09:55)  oxyCODONE 5 mg oral tablet: 1 tab(s) orally every 6 hours As needed Moderate Pain (4 - 6) (12 Jun 2025 09:34)  pantoprazole 40 mg intravenous injection: 40 milligram(s) intravenous once a day (12 Jun 2025 09:34)  polyethylene glycol 3350 oral powder for reconstitution: 17 gram(s) orally once a day (12 Jun 2025 09:34)  pramipexole 3.75 mg oral tablet, extended release: 1 tab(s) orally once a day (at bedtime) (04 Jun 2025 06:25)  Tylenol 500 mg oral tablet: 2 tab(s) orally 2 times a day (04 Jun 2025 06:25)  Voquezna 10 mg oral tablet: 1 tab(s) orally once a day (04 Jun 2025 06:25)    MEDICATIONS  (STANDING):  apixaban 5 milliGRAM(s) Oral every 12 hours  atorvastatin 40 milliGRAM(s) Oral at bedtime  bacitracin   Ointment 1 Application(s) Topical daily  chlorhexidine 2% Cloths 1 Application(s) Topical daily  cyclobenzaprine 10 milliGRAM(s) Oral two times a day  insulin lispro (ADMELOG) corrective regimen sliding scale   SubCutaneous Before meals and at bedtime  metoprolol tartrate 100 milliGRAM(s) Oral two times a day  pantoprazole  Injectable 40 milliGRAM(s) IV Push daily  polyethylene glycol 3350 17 Gram(s) Oral daily  pramipexole 1.25 milliGRAM(s) Oral three times a day  pregabalin 100 milliGRAM(s) Oral two times a day  senna 2 Tablet(s) Oral at bedtime  sodium chloride 0.9% lock flush 3 milliLiter(s) IV Push every 8 hours    MEDICATIONS  (PRN):  acetaminophen     Tablet .. 650 milliGRAM(s) Oral every 6 hours PRN Temp greater or equal to 38C (100.4F), Mild Pain (1 - 3)  naloxone Injectable 0.1 milliGRAM(s) IV Push every 3 minutes PRN For ANY of the following changes in patient status:  A. RR LESS THAN 10 breaths per minute, B. Oxygen saturation LESS THAN 90%, C. Sedation score of 6  ondansetron Injectable 4 milliGRAM(s) IV Push every 6 hours PRN Nausea  oxyCODONE    IR 5 milliGRAM(s) Oral every 6 hours PRN Moderate Pain (4 - 6)  oxyCODONE    IR 10 milliGRAM(s) Oral every 6 hours PRN Severe Pain (7 - 10)    ALLERGIES/INTOLERANCES:  Allergies  Dilantin (Urticaria)  penicillins (Urticaria)    Intolerances  erythromycin (Stomach Upset; Vomiting; Nausea)    VITALS & EXAMINATION:  Vital Signs Last 24 Hrs  T(C): 37.1 (12 Jun 2025 07:45), Max: 37.1 (11 Jun 2025 16:58)  T(F): 98.7 (12 Jun 2025 07:45), Max: 98.8 (11 Jun 2025 16:58)  HR: 85 (12 Jun 2025 07:45) (85 - 98)  BP: 121/69 (12 Jun 2025 07:45) (115/57 - 132/71)  BP(mean): --  RR: 18 (12 Jun 2025 07:45) (18 - 18)  SpO2: 98% (12 Jun 2025 07:45) (95% - 98%)    Parameters below as of 12 Jun 2025 07:45  Patient On (Oxygen Delivery Method): room air        General:  Constitutional: Female, appears stated age, in no apparent distress including pain  Head: Normocephalic & Atraumatic.  Respiratory: Breathing comfortably.  Extremities: No cyanosis, clubbing, or edema    Neurological:  MS: Awake, alert, oriented to person, place, situation, time. Follows all commands.    Language: Speech is clear, fluent with good repetition & comprehension.    CNs: PERRL (R = 3mm, L = 3mm). VFF. EOMI no nystagmus. V1-3 intact to LT b/l. No facial asymmetry b/l, full eye closure strength b/l. Hearing grossly normal (rubbing fingers) b/l. Tongue midline, normal movements, no atrophy.     Motor: Normal muscle bulk & tone. No noticeable tremor. No pronator drift.              Deltoid	Biceps	Triceps	   R	         5	             5	              5	 5	  		 	  L	         5	             5	              5	 5	  		    	H-Flex	K-Flex	K-Ext	D-Flex	P-Flex  R	    5	            5	           5	            5	           5		   L	    5	            5	           5	            5	           5		     Sensation: Intact to LT b/l throughout.     Cortical: Extinction on DSS (neglect): none    Reflexes:              Biceps(C5)       BR(C6)     Triceps(C7)               Patellar(L4)    Achilles(S1)    Plantar Resp  R	              2	          2	             2		                              2		    2		      Down   L	              2	          2	             2		                              2		    2		      Down     Coordination: intact rapid-alt movements. No dysmetria to FTN/HTS    Gait:     LABORATORY:  CBC                       10.2   8.65  )-----------( 319      ( 12 Jun 2025 06:47 )             30.9     Chem 06-12    136  |  100  |  21  ----------------------------<  116[H]  3.8   |  25  |  0.68    Ca    8.7      12 Jun 2025 06:47  Phos  3.3     06-12  Mg     2.00     06-12      LFTs   Coagulopathy PT/INR - ( 11 Jun 2025 03:30 )   PT: 12.1 sec;   INR: 1.04 ratio         PTT - ( 11 Jun 2025 03:30 )  PTT:29.5 sec  Lipid Panel 06-10 Chol 134 LDL -- HDL 29[L] Trig 215[H]  A1c   Cardiac enzymes     U/A Urinalysis Basic - ( 12 Jun 2025 06:47 )    Color: x / Appearance: x / SG: x / pH: x  Gluc: 116 mg/dL / Ketone: x  / Bili: x / Urobili: x   Blood: x / Protein: x / Nitrite: x   Leuk Esterase: x / RBC: x / WBC x   Sq Epi: x / Non Sq Epi: x / Bacteria: x      CSF  Immunological  Other    STUDIES & IMAGING:  Studies (EKG, EEG, EMG, etc):     Radiology (XR, CT, MR, U/S, TTE/MICHAEL): SUBJECTIVE: Patient says she has been feeling better since last night.  She feels that her speech is improved.    INTERVAL HISTORY: Patient seen and examined at bedside by stroke neurology team, fellow, and attending.    PAST MEDICAL & SURGICAL HISTORY:  H/O seasonal allergies      History of pulmonary embolism  developed after billings abigail surgery,1984 treated with coumadin 3 months, no issues after      Restless leg syndrome      GERD (gastroesophageal reflux disease)      OA (osteoarthritis)      H/O scoliosis      Essential hypertension      Depression      Osteoporosis      2019 novel coronavirus disease (COVID-19)  Dec 2020- hospitalized for 3 days, did not require home O2, denies residual symptoms      Hiatal hernia      History of chronic pain      Peripheral neuropathy      Chronic constipation      Chronic GERD      Closed fracture of thoracic vertebra      MSSA (methicillin susceptible Staphylococcus aureus) infection      SILVANA on CPAP      Paroxysmal atrial fibrillation      PFO (patent foramen ovale)      Hearing loss      Postlaminectomy syndrome      Delayed gastric emptying      Anemia      DM2 (diabetes mellitus, type 2)      S/P repair of paraesophageal hernia  2001      S/P spinal fusion  L4-L5 1966      Scoliosis  s/p placement of billings abigail x 2 1984      S/P spinal surgery  x 2 1985, 1986      Removal of pin, plate, abigail, or screw  1991- removal of billings abigail      S/P hysterectomy  1982      S/P hip replacement  left (2008)      S/P shoulder surgery  right (2012)      S/P dilatation and curettage  1981      H/O arthroscopy of knee  June 2021      S/P cataract surgery      History of bilateral hip replacements      History of repair of hiatal hernia        FAMILY HISTORY:  Family history of abdominal aortic aneurysm (AAA) (Mother)      SOCIAL HISTORY:   T/E/D:   Occupation:   Lives with:     MEDICATIONS (HOME):  Home Medications:  apixaban 5 mg oral tablet: 1 tab(s) orally every 12 hours (12 Jun 2025 09:34)  chlorthalidone 25 mg oral tablet: 1 tab(s) orally once a day (04 Jun 2025 06:25)  Dulcolax 1 tab orally once a day at bedtime:  (04 Jun 2025 06:25)  Flexeril 10 mg oral tablet: 1 tab(s) orally 2 times a day (04 Jun 2025 06:25)  Lyrica 100 mg oral capsule: 1 cap(s) orally 2 times a day (04 Jun 2025 06:25)  magnesium 3 tabs orally at bedtime:  (04 Jun 2025 06:25)  naloxone: 0.1 milliliter(s) orally once as needed for overdose (12 Jun 2025 09:55)  oxyCODONE 10 mg oral tablet: 1 tab(s) orally every 6 hours As needed Severe Pain (7 - 10) (12 Jun 2025 09:55)  oxyCODONE 5 mg oral tablet: 1 tab(s) orally every 6 hours As needed Moderate Pain (4 - 6) (12 Jun 2025 09:34)  pantoprazole 40 mg intravenous injection: 40 milligram(s) intravenous once a day (12 Jun 2025 09:34)  polyethylene glycol 3350 oral powder for reconstitution: 17 gram(s) orally once a day (12 Jun 2025 09:34)  pramipexole 3.75 mg oral tablet, extended release: 1 tab(s) orally once a day (at bedtime) (04 Jun 2025 06:25)  Tylenol 500 mg oral tablet: 2 tab(s) orally 2 times a day (04 Jun 2025 06:25)  Voquezna 10 mg oral tablet: 1 tab(s) orally once a day (04 Jun 2025 06:25)    MEDICATIONS  (STANDING):  apixaban 5 milliGRAM(s) Oral every 12 hours  atorvastatin 40 milliGRAM(s) Oral at bedtime  bacitracin   Ointment 1 Application(s) Topical daily  chlorhexidine 2% Cloths 1 Application(s) Topical daily  cyclobenzaprine 10 milliGRAM(s) Oral two times a day  insulin lispro (ADMELOG) corrective regimen sliding scale   SubCutaneous Before meals and at bedtime  metoprolol tartrate 100 milliGRAM(s) Oral two times a day  pantoprazole  Injectable 40 milliGRAM(s) IV Push daily  polyethylene glycol 3350 17 Gram(s) Oral daily  pramipexole 1.25 milliGRAM(s) Oral three times a day  pregabalin 100 milliGRAM(s) Oral two times a day  senna 2 Tablet(s) Oral at bedtime  sodium chloride 0.9% lock flush 3 milliLiter(s) IV Push every 8 hours    MEDICATIONS  (PRN):  acetaminophen     Tablet .. 650 milliGRAM(s) Oral every 6 hours PRN Temp greater or equal to 38C (100.4F), Mild Pain (1 - 3)  naloxone Injectable 0.1 milliGRAM(s) IV Push every 3 minutes PRN For ANY of the following changes in patient status:  A. RR LESS THAN 10 breaths per minute, B. Oxygen saturation LESS THAN 90%, C. Sedation score of 6  ondansetron Injectable 4 milliGRAM(s) IV Push every 6 hours PRN Nausea  oxyCODONE    IR 5 milliGRAM(s) Oral every 6 hours PRN Moderate Pain (4 - 6)  oxyCODONE    IR 10 milliGRAM(s) Oral every 6 hours PRN Severe Pain (7 - 10)    ALLERGIES/INTOLERANCES:  Allergies  Dilantin (Urticaria)  penicillins (Urticaria)    Intolerances  erythromycin (Stomach Upset; Vomiting; Nausea)    VITALS & EXAMINATION:  Vital Signs Last 24 Hrs  T(C): 37.1 (12 Jun 2025 07:45), Max: 37.1 (11 Jun 2025 16:58)  T(F): 98.7 (12 Jun 2025 07:45), Max: 98.8 (11 Jun 2025 16:58)  HR: 85 (12 Jun 2025 07:45) (85 - 98)  BP: 121/69 (12 Jun 2025 07:45) (115/57 - 132/71)  BP(mean): --  RR: 18 (12 Jun 2025 07:45) (18 - 18)  SpO2: 98% (12 Jun 2025 07:45) (95% - 98%)    Parameters below as of 12 Jun 2025 07:45  Patient On (Oxygen Delivery Method): room air        General:  Constitutional: Female, appears stated age, in no apparent distress including pain  Head: Normocephalic & Atraumatic.  Respiratory: Breathing comfortably.  Extremities: No cyanosis, clubbing, or edema    Neurological:  MS: Awake, alert, oriented to month, not oriented to age (answered "46" then corrected to "53")  Language: Speech is clear, fluent with good repetition.  Naming intact.  Able to follow simple and complex commands.  CNs: VFF. EOMI no nystagmus. V1-3 intact to LT b/l. No facial asymmetry b/l, full eye closure strength b/l.  Motor: RUE w/mild drift.  LUE w/o drift.  RLE and LLE w/ some effort against gravity.  Sensation: Intact to LT b/l throughout.  Reflexes: Deferred in focused exam.  Coordination: No dysmetria to FTN.  Gait: Deferred.    LABORATORY:  CBC                       10.2   8.65  )-----------( 319      ( 12 Jun 2025 06:47 )             30.9     Chem 06-12    136  |  100  |  21  ----------------------------<  116[H]  3.8   |  25  |  0.68    Ca    8.7      12 Jun 2025 06:47  Phos  3.3     06-12  Mg     2.00     06-12      LFTs   Coagulopathy PT/INR - ( 11 Jun 2025 03:30 )   PT: 12.1 sec;   INR: 1.04 ratio         PTT - ( 11 Jun 2025 03:30 )  PTT:29.5 sec  Lipid Panel 06-10 Chol 134 LDL -- HDL 29[L] Trig 215[H]  A1c   Cardiac enzymes     U/A Urinalysis Basic - ( 12 Jun 2025 06:47 )    Color: x / Appearance: x / SG: x / pH: x  Gluc: 116 mg/dL / Ketone: x  / Bili: x / Urobili: x   Blood: x / Protein: x / Nitrite: x   Leuk Esterase: x / RBC: x / WBC x   Sq Epi: x / Non Sq Epi: x / Bacteria: x      CSF  Immunological  Other    STUDIES & IMAGING:  Studies (EKG, EEG, EMG, etc):     Radiology (XR, CT, MR, U/S, TTE/MICHAEL): SUBJECTIVE: Patient says she has been feeling better since last night.  She feels that her speech is improved.    INTERVAL HISTORY: Patient seen and examined at bedside by stroke neurology team, fellow, and attending.    PAST MEDICAL & SURGICAL HISTORY:  H/O seasonal allergies      History of pulmonary embolism  developed after billings abigail surgery,1984 treated with coumadin 3 months, no issues after      Restless leg syndrome      GERD (gastroesophageal reflux disease)      OA (osteoarthritis)      H/O scoliosis      Essential hypertension      Depression      Osteoporosis      2019 novel coronavirus disease (COVID-19)  Dec 2020- hospitalized for 3 days, did not require home O2, denies residual symptoms      Hiatal hernia      History of chronic pain      Peripheral neuropathy      Chronic constipation      Chronic GERD      Closed fracture of thoracic vertebra      MSSA (methicillin susceptible Staphylococcus aureus) infection      SILVANA on CPAP      Paroxysmal atrial fibrillation      PFO (patent foramen ovale)      Hearing loss      Postlaminectomy syndrome      Delayed gastric emptying      Anemia      DM2 (diabetes mellitus, type 2)      S/P repair of paraesophageal hernia  2001      S/P spinal fusion  L4-L5 1966      Scoliosis  s/p placement of billings abigail x 2 1984      S/P spinal surgery  x 2 1985, 1986      Removal of pin, plate, abigail, or screw  1991- removal of billings abigail      S/P hysterectomy  1982      S/P hip replacement  left (2008)      S/P shoulder surgery  right (2012)      S/P dilatation and curettage  1981      H/O arthroscopy of knee  June 2021      S/P cataract surgery      History of bilateral hip replacements      History of repair of hiatal hernia        FAMILY HISTORY:  Family history of abdominal aortic aneurysm (AAA) (Mother)      SOCIAL HISTORY:   T/E/D:   Occupation:   Lives with:     MEDICATIONS (HOME):  Home Medications:  apixaban 5 mg oral tablet: 1 tab(s) orally every 12 hours (12 Jun 2025 09:34)  chlorthalidone 25 mg oral tablet: 1 tab(s) orally once a day (04 Jun 2025 06:25)  Dulcolax 1 tab orally once a day at bedtime:  (04 Jun 2025 06:25)  Flexeril 10 mg oral tablet: 1 tab(s) orally 2 times a day (04 Jun 2025 06:25)  Lyrica 100 mg oral capsule: 1 cap(s) orally 2 times a day (04 Jun 2025 06:25)  magnesium 3 tabs orally at bedtime:  (04 Jun 2025 06:25)  naloxone: 0.1 milliliter(s) orally once as needed for overdose (12 Jun 2025 09:55)  oxyCODONE 10 mg oral tablet: 1 tab(s) orally every 6 hours As needed Severe Pain (7 - 10) (12 Jun 2025 09:55)  oxyCODONE 5 mg oral tablet: 1 tab(s) orally every 6 hours As needed Moderate Pain (4 - 6) (12 Jun 2025 09:34)  pantoprazole 40 mg intravenous injection: 40 milligram(s) intravenous once a day (12 Jun 2025 09:34)  polyethylene glycol 3350 oral powder for reconstitution: 17 gram(s) orally once a day (12 Jun 2025 09:34)  pramipexole 3.75 mg oral tablet, extended release: 1 tab(s) orally once a day (at bedtime) (04 Jun 2025 06:25)  Tylenol 500 mg oral tablet: 2 tab(s) orally 2 times a day (04 Jun 2025 06:25)  Voquezna 10 mg oral tablet: 1 tab(s) orally once a day (04 Jun 2025 06:25)    MEDICATIONS  (STANDING):  apixaban 5 milliGRAM(s) Oral every 12 hours  atorvastatin 40 milliGRAM(s) Oral at bedtime  bacitracin   Ointment 1 Application(s) Topical daily  chlorhexidine 2% Cloths 1 Application(s) Topical daily  cyclobenzaprine 10 milliGRAM(s) Oral two times a day  insulin lispro (ADMELOG) corrective regimen sliding scale   SubCutaneous Before meals and at bedtime  metoprolol tartrate 100 milliGRAM(s) Oral two times a day  pantoprazole  Injectable 40 milliGRAM(s) IV Push daily  polyethylene glycol 3350 17 Gram(s) Oral daily  pramipexole 1.25 milliGRAM(s) Oral three times a day  pregabalin 100 milliGRAM(s) Oral two times a day  senna 2 Tablet(s) Oral at bedtime  sodium chloride 0.9% lock flush 3 milliLiter(s) IV Push every 8 hours    MEDICATIONS  (PRN):  acetaminophen     Tablet .. 650 milliGRAM(s) Oral every 6 hours PRN Temp greater or equal to 38C (100.4F), Mild Pain (1 - 3)  naloxone Injectable 0.1 milliGRAM(s) IV Push every 3 minutes PRN For ANY of the following changes in patient status:  A. RR LESS THAN 10 breaths per minute, B. Oxygen saturation LESS THAN 90%, C. Sedation score of 6  ondansetron Injectable 4 milliGRAM(s) IV Push every 6 hours PRN Nausea  oxyCODONE    IR 5 milliGRAM(s) Oral every 6 hours PRN Moderate Pain (4 - 6)  oxyCODONE    IR 10 milliGRAM(s) Oral every 6 hours PRN Severe Pain (7 - 10)    ALLERGIES/INTOLERANCES:  Allergies  Dilantin (Urticaria)  penicillins (Urticaria)    Intolerances  erythromycin (Stomach Upset; Vomiting; Nausea)    VITALS & EXAMINATION:  Vital Signs Last 24 Hrs  T(C): 37.1 (12 Jun 2025 07:45), Max: 37.1 (11 Jun 2025 16:58)  T(F): 98.7 (12 Jun 2025 07:45), Max: 98.8 (11 Jun 2025 16:58)  HR: 85 (12 Jun 2025 07:45) (85 - 98)  BP: 121/69 (12 Jun 2025 07:45) (115/57 - 132/71)  BP(mean): --  RR: 18 (12 Jun 2025 07:45) (18 - 18)  SpO2: 98% (12 Jun 2025 07:45) (95% - 98%)    Parameters below as of 12 Jun 2025 07:45  Patient On (Oxygen Delivery Method): room air        General:  Constitutional: Female, appears stated age, in no apparent distress including pain  Head: Normocephalic & Atraumatic.  Respiratory: Breathing comfortably.  Extremities: No cyanosis, clubbing, or edema    Neurological:  MS: Awake, alert, oriented to month, not oriented to age (answered "46" then corrected to "53")  Language: Speech is clear, fluent with good repetition.  Naming intact.  Able to follow simple and complex commands.  CNs: VFF. EOMI no nystagmus. V1-3 intact to LT b/l. No facial asymmetry b/l, full eye closure strength b/l.  Motor: RUE w/mild drift.  LUE w/o drift.  RLE and LLE w/ some effort against gravity.  Sensation: Intact to LT b/l throughout.  Reflexes: Deferred in focused exam.  Coordination: No dysmetria to FTN.  Gait: Deferred.    LABORATORY:  CBC                       10.2   8.65  )-----------( 319      ( 12 Jun 2025 06:47 )             30.9     Chem 06-12    136  |  100  |  21  ----------------------------<  116[H]  3.8   |  25  |  0.68    Ca    8.7      12 Jun 2025 06:47  Phos  3.3     06-12  Mg     2.00     06-12      LFTs   Coagulopathy PT/INR - ( 11 Jun 2025 03:30 )   PT: 12.1 sec;   INR: 1.04 ratio         PTT - ( 11 Jun 2025 03:30 )  PTT:29.5 sec  Lipid Panel 06-10 Chol 134 LDL -- HDL 29[L] Trig 215[H]  A1c   Cardiac enzymes     U/A Urinalysis Basic - ( 12 Jun 2025 06:47 )    Color: x / Appearance: x / SG: x / pH: x  Gluc: 116 mg/dL / Ketone: x  / Bili: x / Urobili: x   Blood: x / Protein: x / Nitrite: x   Leuk Esterase: x / RBC: x / WBC x   Sq Epi: x / Non Sq Epi: x / Bacteria: x      CSF  Immunological  Other    STUDIES & IMAGING:  Studies (EKG, EEG, EMG, etc):     Radiology (XR, CT, MR, U/S, TTE/MICHAEL):    CTH 6/11:  Known bilateral occipital and thalamic infarcts are not as well-visualized currently compared to prior study likely due to differences in technique. No evidence for hemorrhagic transformation. There is no significant mass effect or shift.    CTA 6/11:   CT PERFUSION:  Technical limitations: Motion degraded.    Core infarction: 0 ml  Penumbra / tissue at risk for active ischemia: 0 ml    CTA NECK:  No evidence of significant stenosis or occlusion.    CTA HEAD:  No large vessel occlusion, significant stenosis or vascular abnormality identified.    MRI Brain:  1. Motion limited study.  2. Acute/subacute bilateral PCA distribution infarcts with associated   cytotoxic edema. No associated hemorrhage.  3. Multiple additional nonspecific abnormal white matter foci of T2/FLAIR   prolongation statistically favoring microvascular type changes.    CT HEAD:  No acute intracranial hemorrhage, mass effect, or midline shift.    CTA NECK:  No evidence of significant stenosis or occlusion.    CTA HEAD:  No large vessel occlusion, significant stenosis or vascular abnormality   identified.    TTE 6/6:    1. Technically difficult image quality.   2. Left ventricular cavity is normal in size. Left ventricular wall thickness is normal. Left ventricular systolic function is normal with an ejection fraction of 70 % by [place]'s method of disks. There are no regional wall motion abnormalities seen.   3. Normal right ventricular cavity size and probably normal right ventricular systolic function. Tricuspid annular plane systolic excursion (TAPSE) is 2.5 cm (normal >=1.7 cm).   4. Structurally normal mitral valve with normal leaflet excursion. There is calcification of the mitral valve annulus. There is trace mitral regurgitation.

## 2025-06-12 NOTE — PROGRESS NOTE ADULT - ASSESSMENT
76 y/o female PMH of HTN, SILVANA, PAF on Eliquis PE (in 1980s) OA, hiatal hernia (repair 2023) gastroparesis, peripheral neuropathy, SILVANA on CPAP, and extensive prior spinal surgery (last in 09/2024-T9 partial corpectomy, had paraspinal abscess drained in 10/2024) presented as SDA for T3 to pelvis fusion and instrumention with L4 PSO on 6/4/25.     6/4 OR for T3 to pelvis fusion and instrumention with L4 PSO, with Dr. Stroud (neurosurgery), Dr. Gray (ortho), and Dr. Bolton (plastics), closed with nylons. 4uPRBCs given intraop, has tim bui, drains per plastics. Low pressures at end of case, pt required increasing phenylephrine intraop. 500 cc LR bolus given for increasing vasopressor requirements and elevated lactate.     6/5 POD 1, intubated and sedated (partially weaned for exam), MAEx4 spont under wrist restraints, incision c/d/i. On ancef 24h postop. Postop Hg 11.0, ramya /70.  6/7 POD 3,  H/H 8.2/23.4, trending cbc every 12h as long as patient is hemodynamically stable, hypokalemic this morning to 2.9 given potassium chloride tablets for repletion. On exam, Ms. French is more alert to person and place.   6/8 POD 4, H/H stable, exam improving. Deferring MRI for now as exam is improving and less concern for stroke. Daily H/H to decrease blood draws.   6/9 POD 5, Pending repeat H/H, now on floor, lower half of dressing saturated so dressing replaced.   6/10: pod 6, H&H stable, mri brain  Acute/subacute bilateral PCA distribution infarcts with associated, neurology called, drains per plastics (2 drains d'cd)  6/11 POD7, H&H stable, RT lower drain per plastics, Rapid response and code stroke called 2/2 worse AMS, CTH/CTA head/neck done, b/l PCA infarcts similar to MRI findings, no new acute findings. Neuro following. Subsequent episode of aphasia, rpt CTH stable. Restarted on eliquis 5mg BID and metoprolol 100mg BID.   6/12 POD 8, exam improved, no aphasia, pending CBC, RADHA drain dc'd. cont eliquis and metoprolol. pending EEG

## 2025-06-12 NOTE — PROGRESS NOTE ADULT - SUBJECTIVE AND OBJECTIVE BOX
Plastic Surgery Progress Note (pg [facility]: [id number], [facility]: [phone])    SUBJECTIVE  The patient was seen and examined. No acute events overnight. Pain controlled, afebrile w/ stable vitals.     OBJECTIVE  ___________________________________________________  VITAL SIGNS / I&O's   Vital Signs Last 24 Hrs  T(C): 36.9 (12 Jun 2025 04:00), Max: 37.1 (11 Jun 2025 16:58)  T(F): 98.5 (12 Jun 2025 04:00), Max: 98.8 (11 Jun 2025 16:58)  HR: 88 (12 Jun 2025 04:00) (85 - 98)  BP: 117/51 (12 Jun 2025 04:00) (115/57 - 132/71)  BP(mean): --  RR: 18 (12 Jun 2025 04:00) (18 - 18)  SpO2: 97% (12 Jun 2025 04:00) (95% - 98%)    Parameters below as of 12 Jun 2025 04:00  Patient On (Oxygen Delivery Method): room air          11 Jun 2025 07:01  -  12 Jun 2025 07:00  --------------------------------------------------------  IN:    Oral Fluid: 1040 mL  Total IN: 1040 mL    OUT:    Blood Loss (mL): 0 mL    IV PiggyBack: 0 mL    Voided (mL): 1200 mL  Total OUT: 1200 mL    Total NET: -160 mL        ___________________________________________________  PHYSICAL EXAM    -- CONSTITUTIONAL: NAD, lying in bed  -- NEURO: Awake, alert  -- HEENT: NC/AT, mucous membranes moist  -- NECK: Soft, no asymmetry  -- PULM: Non-labored respirations, equal chest rise bilaterally  -- BACK: Soft, flat. Dressing c/d/i.   -- EXTREMITIES: Warm and well perfused  -- PSYCH: Affect normal, A&Ox3    ___________________________________________________  LABS                        10.2   8.65  )-----------( 319      ( 12 Jun 2025 06:47 )             30.9     12 Jun 2025 06:47    136    |  100    |  21     ----------------------------<  116    3.8     |  25     |  0.68     Ca    8.7        12 Jun 2025 06:47  Phos  3.3       12 Jun 2025 06:47  Mg     2.00      12 Jun 2025 06:47      PT/INR - ( 11 Jun 2025 03:30 )   PT: 12.1 sec;   INR: 1.04 ratio         PTT - ( 11 Jun 2025 03:30 )  PTT:29.5 sec  CAPILLARY BLOOD GLUCOSE      POCT Blood Glucose.: 134 mg/dL (12 Jun 2025 08:09)  POCT Blood Glucose.: 140 mg/dL (11 Jun 2025 21:18)  POCT Blood Glucose.: 108 mg/dL (11 Jun 2025 17:32)  POCT Blood Glucose.: 130 mg/dL (11 Jun 2025 12:04)        Urinalysis Basic - ( 12 Jun 2025 06:47 )    Color: x / Appearance: x / SG: x / pH: x  Gluc: 116 mg/dL / Ketone: x  / Bili: x / Urobili: x   Blood: x / Protein: x / Nitrite: x   Leuk Esterase: x / RBC: x / WBC x   Sq Epi: x / Non Sq Epi: x / Bacteria: x      ___________________________________________________  MICRO  Recent Cultures:    ___________________________________________________  MEDICATIONS  (STANDING):  apixaban 5 milliGRAM(s) Oral every 12 hours  atorvastatin 40 milliGRAM(s) Oral at bedtime  bacitracin   Ointment 1 Application(s) Topical daily  chlorhexidine 2% Cloths 1 Application(s) Topical daily  cyclobenzaprine 10 milliGRAM(s) Oral two times a day  insulin lispro (ADMELOG) corrective regimen sliding scale   SubCutaneous Before meals and at bedtime  metoprolol tartrate 100 milliGRAM(s) Oral two times a day  pantoprazole  Injectable 40 milliGRAM(s) IV Push daily  polyethylene glycol 3350 17 Gram(s) Oral daily  pramipexole 1.25 milliGRAM(s) Oral three times a day  pregabalin 100 milliGRAM(s) Oral two times a day  senna 2 Tablet(s) Oral at bedtime  sodium chloride 0.9% lock flush 3 milliLiter(s) IV Push every 8 hours    MEDICATIONS  (PRN):  acetaminophen     Tablet .. 650 milliGRAM(s) Oral every 6 hours PRN Temp greater or equal to 38C (100.4F), Mild Pain (1 - 3)  naloxone Injectable 0.1 milliGRAM(s) IV Push every 3 minutes PRN For ANY of the following changes in patient status:  A. RR LESS THAN 10 breaths per minute, B. Oxygen saturation LESS THAN 90%, C. Sedation score of 6  ondansetron Injectable 4 milliGRAM(s) IV Push every 6 hours PRN Nausea  oxyCODONE    IR 5 milliGRAM(s) Oral every 6 hours PRN Moderate Pain (4 - 6)  oxyCODONE    IR 10 milliGRAM(s) Oral every 6 hours PRN Severe Pain (7 - 10)

## 2025-06-12 NOTE — PROGRESS NOTE ADULT - SUBJECTIVE AND OBJECTIVE BOX
[facility] Division of Hospital Medicine  Javier Han MD  Pager (S-F, 1M-3N): [phone]  Other Times:  [phone]    Patient is a 75y old  Female who presents with a chief complaint of SDA (2025 04:16)      SUBJECTIVE / OVERNIGHT EVENTS: Tele with SR; drain dc'ed; pain controlled and s/p BM     MEDICATIONS  (STANDING):  apixaban 5 milliGRAM(s) Oral every 12 hours  atorvastatin 40 milliGRAM(s) Oral at bedtime  bacitracin   Ointment 1 Application(s) Topical daily  chlorhexidine 2% Cloths 1 Application(s) Topical daily  cyclobenzaprine 10 milliGRAM(s) Oral two times a day  insulin lispro (ADMELOG) corrective regimen sliding scale   SubCutaneous Before meals and at bedtime  metoprolol tartrate 100 milliGRAM(s) Oral two times a day  pantoprazole  Injectable 40 milliGRAM(s) IV Push daily  polyethylene glycol 3350 17 Gram(s) Oral daily  pramipexole 1.25 milliGRAM(s) Oral three times a day  pregabalin 100 milliGRAM(s) Oral two times a day  senna 2 Tablet(s) Oral at bedtime  sodium chloride 0.9% lock flush 3 milliLiter(s) IV Push every 8 hours    MEDICATIONS  (PRN):  acetaminophen     Tablet .. 650 milliGRAM(s) Oral every 6 hours PRN Temp greater or equal to 38C (100.4F), Mild Pain (1 - 3)  naloxone Injectable 0.1 milliGRAM(s) IV Push every 3 minutes PRN For ANY of the following changes in patient status:  A. RR LESS THAN 10 breaths per minute, B. Oxygen saturation LESS THAN 90%, C. Sedation score of 6  ondansetron Injectable 4 milliGRAM(s) IV Push every 6 hours PRN Nausea  oxyCODONE    IR 5 milliGRAM(s) Oral every 6 hours PRN Moderate Pain (4 - 6)  oxyCODONE    IR 10 milliGRAM(s) Oral every 6 hours PRN Severe Pain (7 - 10)      CAPILLARY BLOOD GLUCOSE      POCT Blood Glucose.: 134 mg/dL (2025 08:09)  POCT Blood Glucose.: 140 mg/dL (2025 21:18)  POCT Blood Glucose.: 108 mg/dL (2025 17:32)  POCT Blood Glucose.: 130 mg/dL (2025 12:04)    I&O's Summary    2025 07:01  -  2025 07:00  --------------------------------------------------------  IN: 1040 mL / OUT: 1200 mL / NET: -160 mL        PHYSICAL EXAM:  Vital Signs Last 24 Hrs  T(C): 37.1 (2025 07:45), Max: 37.1 (2025 16:58)  T(F): 98.7 (2025 07:45), Max: 98.8 (2025 16:58)  HR: 85 (2025 07:45) (85 - 98)  BP: 121/69 (2025 07:45) (115/57 - 132/71)  BP(mean): --  RR: 18 (2025 07:45) (18 - 18)  SpO2: 98% (2025 07:45) (95% - 98%)    Parameters below as of 2025 07:45  Patient On (Oxygen Delivery Method): room air    GENERAL: NAD  EYES: conjunctiva and sclera clear  NECK: Supple, No JVD  CHEST/LUNG: Clear to auscultation bilaterally; No wheeze  HEART: Regular rate and rhythm;   ABDOMEN: Soft, Nontender, Nondistended; Bowel sounds present  EXTREMITIES:  no edema   PSYCH: AAOx2-3 self/ and hospital but did not which one; initially stated it was  and subsequently corrected herself to    NEUROLOGY: RT LE 3/5;  SKIN: bandage upper back       LABS:                        10.2   8.65  )-----------( 319      ( 2025 06:47 )             30.9     06-12    136  |  100  |  21  ----------------------------<  116[H]  3.8   |  25  |  0.68    Ca    8.7      2025 06:47  Phos  3.3     06-12  Mg     2.00     06-12      PT/INR - ( 2025 03:30 )   PT: 12.1 sec;   INR: 1.04 ratio         PTT - ( 2025 03:30 )  PTT:29.5 sec      Urinalysis Basic - ( 2025 06:47 )    Color: x / Appearance: x / SG: x / pH: x  Gluc: 116 mg/dL / Ketone: x  / Bili: x / Urobili: x   Blood: x / Protein: x / Nitrite: x   Leuk Esterase: x / RBC: x / WBC x   Sq Epi: x / Non Sq Epi: x / Bacteria: x          RADIOLOGY & ADDITIONAL TESTS:  Results Reviewed: < from: CT Head No Cont (25 @ 13:19) >  FINDINGS:  Bilateral occipital infarcts, not as well visualized on current study   compared to prior exam earlier same day at 3:49 AM, likely related to   differences in technique. No hemorrhagic transformation. No significant   mass effect. Bilateral thalamic infarcts are grossly unchanged.    No extra-axial collection. Ventriclesand sulci are normal in size for   the patient's age without hydrocephalus.  Basal cisterns are patent.    Visualized paranasal sinuses are clear.  Tympanomastoid cavities are   clear. Calvarium is intact.    IMPRESSION:  Known bilateral occipital and thalamic infarcts are not as   well-visualized currently compared to prior study likely due to   differences in technique. No evidence for hemorrhagic transformation.   There is no significant mass effect or shift.    --- End of Report ---    < end of copied text >    Imaging Personally Reviewed:  Electrocardiogram Personally Reviewed:    COORDINATION OF CARE:  Care Discussed with Consultants/Other Providers [Y/N]:  Prior or Outpatient Records Reviewed [Y/N]:

## 2025-06-12 NOTE — PROGRESS NOTE ADULT - SUBJECTIVE AND OBJECTIVE BOX
PAST 24HR EVENTS:  Afebrile. Episode of word finding difficulty 6/11, rpt CTH stable. Restarted on eliquis 5mg BID and metoprolol 100mg BID (home dose)    Vital Signs Last 24 Hrs  T(C): 37 (11 Jun 2025 23:57), Max: 37.8 (11 Jun 2025 04:52)  T(F): 98.6 (11 Jun 2025 23:57), Max: 100 (11 Jun 2025 04:52)  HR: 85 (11 Jun 2025 23:57) (85 - 102)  BP: 116/50 (11 Jun 2025 23:57) (104/51 - 144/63)  BP(mean): 64 (11 Jun 2025 08:00) (64 - 64)  RR: 18 (11 Jun 2025 23:57) (17 - 18)  SpO2: 95% (11 Jun 2025 23:57) (95% - 98%)    Parameters below as of 11 Jun 2025 23:57  Patient On (Oxygen Delivery Method): room air        MEDS:   acetaminophen     Tablet .. 650 milliGRAM(s) Oral every 6 hours PRN  apixaban 5 milliGRAM(s) Oral every 12 hours  atorvastatin 40 milliGRAM(s) Oral at bedtime  bacitracin   Ointment 1 Application(s) Topical daily  chlorhexidine 2% Cloths 1 Application(s) Topical daily  cyclobenzaprine 10 milliGRAM(s) Oral two times a day  insulin lispro (ADMELOG) corrective regimen sliding scale   SubCutaneous Before meals and at bedtime  metoprolol tartrate 100 milliGRAM(s) Oral two times a day  naloxone Injectable 0.1 milliGRAM(s) IV Push every 3 minutes PRN  ondansetron Injectable 4 milliGRAM(s) IV Push every 6 hours PRN  oxyCODONE    IR 5 milliGRAM(s) Oral every 6 hours PRN  oxyCODONE    IR 10 milliGRAM(s) Oral every 6 hours PRN  pantoprazole  Injectable 40 milliGRAM(s) IV Push daily  polyethylene glycol 3350 17 Gram(s) Oral daily  pramipexole 1.25 milliGRAM(s) Oral three times a day  pregabalin 100 milliGRAM(s) Oral two times a day  senna 2 Tablet(s) Oral at bedtime  sodium chloride 0.9% lock flush 3 milliLiter(s) IV Push every 8 hours      LABS:                        10.2   11.09 )-----------( 277      ( 11 Jun 2025 03:30 )             31.9     06-11    133[L]  |  99  |  22  ----------------------------<  128[H]  4.5   |  20[L]  |  0.88    Ca    8.3[L]      11 Jun 2025 03:30  Phos  2.4     06-10  Mg     1.90     06-10      PT/INR - ( 11 Jun 2025 03:30 )   PT: 12.1 sec;   INR: 1.04 ratio         PTT - ( 11 Jun 2025 03:30 )  PTT:29.5 sec    RADIOLOGY:   < from: CT Head No Cont (06.11.25 @ 13:19) >  IMPRESSION:  Known bilateral occipital and thalamic infarcts are not as   well-visualized currently compared to prior study likely due to   differences in technique. No evidence for hemorrhagic transformation.   There is no significant mass effect or shift.    --- End of Report ---    < end of copied text >      PHYSICAL EXAM:  AOx2 to name and place, Following Commands, Face symmetrical  EOMI, PERRL, CN 2-12 intact     RUE MOTOR:   Delt 5/5  Bicep 5/5  Tricep 5/5      HG 5/5      LUE MOTOR:   Delt 5/5  Bicep 5/5   Tricep 5/5     HG 5/5     RLE MOTOR:   HF 4+/5                    KE 3/5         DF 5/5         PF 5/5    EHL 5/5    LLE MOTOR:   HF 5/5       KE 5/5            DF 5/5            PF 5/5       EHL 5/5      SENSATION: intact to light touch     REFLEXES:  No clonus  No Hoffmans    Incision c/d/i

## 2025-06-13 LAB
ANION GAP SERPL CALC-SCNC: 15 MMOL/L — HIGH (ref 7–14)
BUN SERPL-MCNC: 20 MG/DL — SIGNIFICANT CHANGE UP (ref 7–23)
CALCIUM SERPL-MCNC: 8.7 MG/DL — SIGNIFICANT CHANGE UP (ref 8.4–10.5)
CHLORIDE SERPL-SCNC: 100 MMOL/L — SIGNIFICANT CHANGE UP (ref 98–107)
CO2 SERPL-SCNC: 21 MMOL/L — LOW (ref 22–31)
CREAT SERPL-MCNC: 0.7 MG/DL — SIGNIFICANT CHANGE UP (ref 0.5–1.3)
EGFR: 89 ML/MIN/1.73M2 — SIGNIFICANT CHANGE UP
EGFR: 89 ML/MIN/1.73M2 — SIGNIFICANT CHANGE UP
GLUCOSE BLDC GLUCOMTR-MCNC: 116 MG/DL — HIGH (ref 70–99)
GLUCOSE BLDC GLUCOMTR-MCNC: 117 MG/DL — HIGH (ref 70–99)
GLUCOSE BLDC GLUCOMTR-MCNC: 127 MG/DL — HIGH (ref 70–99)
GLUCOSE BLDC GLUCOMTR-MCNC: 94 MG/DL — SIGNIFICANT CHANGE UP (ref 70–99)
GLUCOSE SERPL-MCNC: 129 MG/DL — HIGH (ref 70–99)
HCT VFR BLD CALC: 30.5 % — LOW (ref 34.5–45)
HGB BLD-MCNC: 9.8 G/DL — LOW (ref 11.5–15.5)
MAGNESIUM SERPL-MCNC: 1.9 MG/DL — SIGNIFICANT CHANGE UP (ref 1.6–2.6)
MCHC RBC-ENTMCNC: 28.7 PG — SIGNIFICANT CHANGE UP (ref 27–34)
MCHC RBC-ENTMCNC: 32.1 G/DL — SIGNIFICANT CHANGE UP (ref 32–36)
MCV RBC AUTO: 89.2 FL — SIGNIFICANT CHANGE UP (ref 80–100)
NRBC # BLD AUTO: 0 K/UL — SIGNIFICANT CHANGE UP (ref 0–0)
NRBC # FLD: 0 K/UL — SIGNIFICANT CHANGE UP (ref 0–0)
NRBC BLD AUTO-RTO: 0 /100 WBCS — SIGNIFICANT CHANGE UP (ref 0–0)
PHOSPHATE SERPL-MCNC: 3.1 MG/DL — SIGNIFICANT CHANGE UP (ref 2.5–4.5)
PLATELET # BLD AUTO: 378 K/UL — SIGNIFICANT CHANGE UP (ref 150–400)
POTASSIUM SERPL-MCNC: 3.5 MMOL/L — SIGNIFICANT CHANGE UP (ref 3.5–5.3)
POTASSIUM SERPL-SCNC: 3.5 MMOL/L — SIGNIFICANT CHANGE UP (ref 3.5–5.3)
RBC # BLD: 3.42 M/UL — LOW (ref 3.8–5.2)
RBC # FLD: 14.3 % — SIGNIFICANT CHANGE UP (ref 10.3–14.5)
SODIUM SERPL-SCNC: 136 MMOL/L — SIGNIFICANT CHANGE UP (ref 135–145)
WBC # BLD: 8.63 K/UL — SIGNIFICANT CHANGE UP (ref 3.8–10.5)
WBC # FLD AUTO: 8.63 K/UL — SIGNIFICANT CHANGE UP (ref 3.8–10.5)

## 2025-06-13 PROCEDURE — 99232 SBSQ HOSP IP/OBS MODERATE 35: CPT

## 2025-06-13 PROCEDURE — 99233 SBSQ HOSP IP/OBS HIGH 50: CPT

## 2025-06-13 PROCEDURE — 72128 CT CHEST SPINE W/O DYE: CPT | Mod: 26

## 2025-06-13 RX ORDER — POVIDONE-IODINE 7.5 %
1 SOLUTION, NON-ORAL TOPICAL DAILY
Refills: 0 | Status: DISCONTINUED | OUTPATIENT
Start: 2025-06-13 | End: 2025-06-17

## 2025-06-13 RX ADMIN — PRAMIPEXOLE DIHYDROCHLORIDE 1.25 MILLIGRAM(S): 1 TABLET ORAL at 14:30

## 2025-06-13 RX ADMIN — Medication 40 MILLIGRAM(S): at 14:29

## 2025-06-13 RX ADMIN — METOPROLOL SUCCINATE 100 MILLIGRAM(S): 50 TABLET, EXTENDED RELEASE ORAL at 17:32

## 2025-06-13 RX ADMIN — APIXABAN 5 MILLIGRAM(S): 2.5 TABLET, FILM COATED ORAL at 05:26

## 2025-06-13 RX ADMIN — METOPROLOL SUCCINATE 100 MILLIGRAM(S): 50 TABLET, EXTENDED RELEASE ORAL at 05:27

## 2025-06-13 RX ADMIN — OXYCODONE HYDROCHLORIDE 10 MILLIGRAM(S): 30 TABLET ORAL at 09:05

## 2025-06-13 RX ADMIN — Medication 1 APPLICATION(S): at 14:33

## 2025-06-13 RX ADMIN — PRAMIPEXOLE DIHYDROCHLORIDE 1.25 MILLIGRAM(S): 1 TABLET ORAL at 21:01

## 2025-06-13 RX ADMIN — Medication 2 TABLET(S): at 21:01

## 2025-06-13 RX ADMIN — APIXABAN 5 MILLIGRAM(S): 2.5 TABLET, FILM COATED ORAL at 17:30

## 2025-06-13 RX ADMIN — Medication 1 APPLICATION(S): at 14:29

## 2025-06-13 RX ADMIN — Medication 3 MILLILITER(S): at 14:37

## 2025-06-13 RX ADMIN — CYCLOBENZAPRINE HYDROCHLORIDE 10 MILLIGRAM(S): 15 CAPSULE, EXTENDED RELEASE ORAL at 17:29

## 2025-06-13 RX ADMIN — Medication 3 MILLILITER(S): at 21:13

## 2025-06-13 RX ADMIN — Medication 3 MILLILITER(S): at 05:11

## 2025-06-13 RX ADMIN — PRAMIPEXOLE DIHYDROCHLORIDE 1.25 MILLIGRAM(S): 1 TABLET ORAL at 05:27

## 2025-06-13 RX ADMIN — PREGABALIN 100 MILLIGRAM(S): 50 CAPSULE ORAL at 17:29

## 2025-06-13 RX ADMIN — ATORVASTATIN CALCIUM 40 MILLIGRAM(S): 80 TABLET, FILM COATED ORAL at 21:01

## 2025-06-13 RX ADMIN — OXYCODONE HYDROCHLORIDE 10 MILLIGRAM(S): 30 TABLET ORAL at 10:05

## 2025-06-13 RX ADMIN — PREGABALIN 100 MILLIGRAM(S): 50 CAPSULE ORAL at 05:26

## 2025-06-13 RX ADMIN — CYCLOBENZAPRINE HYDROCHLORIDE 10 MILLIGRAM(S): 15 CAPSULE, EXTENDED RELEASE ORAL at 05:27

## 2025-06-13 NOTE — PROVIDER CONTACT NOTE (OTHER) - ACTION/TREATMENT ORDERED:
Provider Philly ordered a Cat Scan on the spine and assessed and reinforced the spinal dressing.
Provider aware, stated will come assess patient.
Team aware and at bedside, stated will order CT scan.

## 2025-06-13 NOTE — PROGRESS NOTE ADULT - SUBJECTIVE AND OBJECTIVE BOX
PAST 24HR EVENTS: EEG neg for seizures, lump noted on the upper right back near incision, non tender to palpation will - will obtain CT thoracic spine     Vital Signs Last 24 Hrs  T(C): 37.2 (12 Jun 2025 23:52), Max: 37.2 (12 Jun 2025 23:52)  T(F): 98.9 (12 Jun 2025 23:52), Max: 98.9 (12 Jun 2025 23:52)  HR: 87 (12 Jun 2025 23:52) (85 - 102)  BP: 144/75 (12 Jun 2025 23:52) (107/50 - 144/75)  BP(mean): --  RR: 18 (12 Jun 2025 23:52) (18 - 18)  SpO2: 98% (12 Jun 2025 23:52) (95% - 98%)    Parameters below as of 12 Jun 2025 23:52  Patient On (Oxygen Delivery Method): room air        MEDS:   acetaminophen     Tablet .. 650 milliGRAM(s) Oral every 6 hours PRN  apixaban 5 milliGRAM(s) Oral every 12 hours  atorvastatin 40 milliGRAM(s) Oral at bedtime  bacitracin   Ointment 1 Application(s) Topical daily  chlorhexidine 2% Cloths 1 Application(s) Topical daily  cyclobenzaprine 10 milliGRAM(s) Oral two times a day  insulin lispro (ADMELOG) corrective regimen sliding scale   SubCutaneous Before meals and at bedtime  metoprolol tartrate 100 milliGRAM(s) Oral two times a day  naloxone Injectable 0.1 milliGRAM(s) IV Push every 3 minutes PRN  ondansetron Injectable 4 milliGRAM(s) IV Push every 6 hours PRN  oxyCODONE    IR 5 milliGRAM(s) Oral every 6 hours PRN  oxyCODONE    IR 10 milliGRAM(s) Oral every 6 hours PRN  pantoprazole  Injectable 40 milliGRAM(s) IV Push daily  polyethylene glycol 3350 17 Gram(s) Oral daily  pramipexole 1.25 milliGRAM(s) Oral three times a day  pregabalin 100 milliGRAM(s) Oral two times a day  senna 2 Tablet(s) Oral at bedtime  sodium chloride 0.9% lock flush 3 milliLiter(s) IV Push every 8 hours      LABS:                        10.2   8.65  )-----------( 319      ( 12 Jun 2025 06:47 )             30.9     06-12    136  |  100  |  21  ----------------------------<  116[H]  3.8   |  25  |  0.68    Ca    8.7      12 Jun 2025 06:47  Phos  3.3     06-12  Mg     2.00     06-12          RADIOLOGY:     PHYSICAL EXAM:  AOx2 to name and place, Following Commands, Face symmetrical  EOMI, PERRL, CN 2-12 intact     RUE MOTOR:   Delt 5/5  Bicep 5/5  Tricep 5/5      HG 5/5      LUE MOTOR:   Delt 5/5  Bicep 5/5   Tricep 5/5     HG 5/5     RLE MOTOR:   HF 5/5                    KE 5/5         DF 5/5         PF 5/5    EHL 5/5    LLE MOTOR:   HF 5/5       KE 5/5            DF 5/5            PF 5/5       EHL 5/5      SENSATION: intact to light touch     REFLEXES:  No clonus  No Hoffmans    Incision c/d/i

## 2025-06-13 NOTE — PROVIDER CONTACT NOTE (OTHER) - REASON
Pt. is complaining of feeling "woozy and lightheaded". Pt. is also complaining of a cankersore
Patient has a lump at the top of her spine and has a new scab on her right shoulder
Pt. noted to have a lump by the incision site

## 2025-06-13 NOTE — PROGRESS NOTE ADULT - PROBLEM SELECTOR PLAN 11
as per son no Hx of DM was on Munjaro due to weight gain post prior surgery off since early may  - A1c 5.2.
as per son no Hx of DM was on Munjaro due to weight gain post prior surgery off since early may  - A1c 5.2.
no

## 2025-06-13 NOTE — PROGRESS NOTE ADULT - SUBJECTIVE AND OBJECTIVE BOX
[facility] Division of Hospital Medicine  Javier Han MD  Pager (N-F, 3K-4M): [phone]  Other Times:  [phone]    Patient is a 75y old  Female who presents with a chief complaint of SDA (2025 04:16)      SUBJECTIVE / OVERNIGHT EVENTS: noted some bulging/ swelling as per NS this AM; afebrile son at bedside visiting from [place]; MS improving; HR currently in 80s       MEDICATIONS  (STANDING):  apixaban 5 milliGRAM(s) Oral every 12 hours  atorvastatin 40 milliGRAM(s) Oral at bedtime  bacitracin   Ointment 1 Application(s) Topical daily  chlorhexidine 2% Cloths 1 Application(s) Topical daily  cyclobenzaprine 10 milliGRAM(s) Oral two times a day  insulin lispro (ADMELOG) corrective regimen sliding scale   SubCutaneous Before meals and at bedtime  metoprolol tartrate 100 milliGRAM(s) Oral two times a day  pantoprazole  Injectable 40 milliGRAM(s) IV Push daily  polyethylene glycol 3350 17 Gram(s) Oral daily  povidone iodine 10% Solution 1 Application(s) Topical daily  pramipexole 1.25 milliGRAM(s) Oral three times a day  pregabalin 100 milliGRAM(s) Oral two times a day  senna 2 Tablet(s) Oral at bedtime  sodium chloride 0.9% lock flush 3 milliLiter(s) IV Push every 8 hours    MEDICATIONS  (PRN):  acetaminophen     Tablet .. 650 milliGRAM(s) Oral every 6 hours PRN Temp greater or equal to 38C (100.4F), Mild Pain (1 - 3)  naloxone Injectable 0.1 milliGRAM(s) IV Push every 3 minutes PRN For ANY of the following changes in patient status:  A. RR LESS THAN 10 breaths per minute, B. Oxygen saturation LESS THAN 90%, C. Sedation score of 6  ondansetron Injectable 4 milliGRAM(s) IV Push every 6 hours PRN Nausea  oxyCODONE    IR 5 milliGRAM(s) Oral every 6 hours PRN Moderate Pain (4 - 6)  oxyCODONE    IR 10 milliGRAM(s) Oral every 6 hours PRN Severe Pain (7 - 10)      CAPILLARY BLOOD GLUCOSE      POCT Blood Glucose.: 117 mg/dL (2025 12:09)  POCT Blood Glucose.: 127 mg/dL (2025 08:12)  POCT Blood Glucose.: 109 mg/dL (2025 21:10)  POCT Blood Glucose.: 118 mg/dL (2025 17:03)    I&O's Summary    2025 07:01  -  2025 07:00  --------------------------------------------------------  IN: 760 mL / OUT: 1250 mL / NET: -490 mL    2025 07:01  -  2025 12:39  --------------------------------------------------------  IN: 200 mL / OUT: 500 mL / NET: -300 mL        PHYSICAL EXAM:  Vital Signs Last 24 Hrs  T(C): 36.9 (2025 12:00), Max: 37.2 (2025 23:52)  T(F): 98.4 (2025 12:00), Max: 98.9 (2025 23:52)  HR: 84 (2025 12:00) (84 - 102)  BP: 109/57 (2025 12:00) (108/54 - 144/75)  BP(mean): --  RR: 18 (2025 12:00) (18 - 18)  SpO2: 100% (2025 12:00) (95% - 100%)    Parameters below as of 2025 12:00  Patient On (Oxygen Delivery Method): room air    GENERAL: NAD  EYES: conjunctiva and sclera clear  NECK: Supple, No JVD  CHEST/LUNG: Clear to auscultation bilaterally; No wheeze  HEART: Regular rate and rhythm;   ABDOMEN: Soft, Nontender, Nondistended; Bowel sounds present  EXTREMITIES:  no edema   PSYCH: AAOx3 self/ and hospital; month and yr  NEUROLOGY: RT LE 2- 3/5; RT knee flexion and ext 4/5; RT foot 5/5   SKIN: upper back suture in place without overt hematoma       LABS:                        9.8    8.63  )-----------( 378      ( 2025 07:29 )             30.5     13    136  |  100  |  20  ----------------------------<  129[H]  3.5   |  21[L]  |  0.70    Ca    8.7      2025 07:29  Phos  3.1       Mg     1.90                 Urinalysis Basic - ( 2025 07:29 )    Color: x / Appearance: x / SG: x / pH: x  Gluc: 129 mg/dL / Ketone: x  / Bili: x / Urobili: x   Blood: x / Protein: x / Nitrite: x   Leuk Esterase: x / RBC: x / WBC x   Sq Epi: x / Non Sq Epi: x / Bacteria: x          RADIOLOGY & ADDITIONAL TESTS:  Results Reviewed: Clinical Impression:  Moderate degree of diffuse cerebral dysfunction.  There were no epileptiform abnormalities recorded.        -------------------------------------------------------------------------------------------------------    Jes Olsen MD  Director, [facility]  Imaging Personally Reviewed:  Electrocardiogram Personally Reviewed:    COORDINATION OF CARE:  Care Discussed with Consultants/Other Providers [Y/N]:  Prior or Outpatient Records Reviewed [Y/N]:

## 2025-06-13 NOTE — PROGRESS NOTE ADULT - PROBLEM SELECTOR PLAN 5
- Home regimen: metoprolol 100mg BID and eliquis 5 BID ( on hold due to surgery and post operative blood loss anemia)   - c/w metoprolol 100 BID  - started on DOAC
- Home regimen: metoprolol 100mg BID and eliquis 5 BID ( on hold due to surgery and post operative blood loss anemia)   - increased metoprolol 100 BID  - started on DOAC

## 2025-06-13 NOTE — PROVIDER CONTACT NOTE (OTHER) - ASSESSMENT
Pt is AxO2-3 with son at bedside. Pt denies pain, nausea/vomiting.
Pt is AxO2-3 with son at bedside. Pt denies pain, nausea/vomiting. Denies SOB/chest-pain, VSS: 128/67, HR:100, O2: 99%
NAD noted. Denies pain, SOB

## 2025-06-13 NOTE — PROGRESS NOTE ADULT - PROBLEM SELECTOR PLAN 6
- Home regimen : chlorthalidone   - on metoprolol   - currently at goal off chlorthalidone.
- Home regimen : chlorthalidone   - on metoprolol   - currently at goal off chlorthalidone.

## 2025-06-13 NOTE — PROGRESS NOTE ADULT - PROBLEM SELECTOR PLAN 2
- EBL >1L  - s/p 8 units prbc last transfusion 6/6 ; 1 u cryo1 u FFP and 1 U platelets  - noted bulge on exam   - monitor H/H;   - CT T spine
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 RT LE weakness   - c/w home statin   - CT head b/l occipital and thalamic infarcts are not as well-visualized currently compared to prior study likely due to   differences in technique. No evidence for hemorrhagic transformation.  - MRI unlikely to    - DOAC restarted   - EEG- P   - f/u Neuro recs.

## 2025-06-13 NOTE — PROGRESS NOTE ADULT - SUBJECTIVE AND OBJECTIVE BOX
Orthopedic Surgery      Pt seen and examined. Pt denies any overnight events. Pt reports pain is well controlled. Pt denies any fever/chills/chest pain/nausea/vomiting.      ICU Vital Signs Last 24 Hrs  T(C): 37.1 (13 Jun 2025 05:10), Max: 37.2 (12 Jun 2025 23:52)  T(F): 98.8 (13 Jun 2025 05:10), Max: 98.9 (12 Jun 2025 23:52)  HR: 87 (12 Jun 2025 23:52) (85 - 102)  BP: 134/61 (13 Jun 2025 05:10) (107/50 - 144/75)  BP(mean): --  ABP: --  ABP(mean): --  RR: 18 (13 Jun 2025 05:10) (18 - 18)  SpO2: 100% (13 Jun 2025 05:10) (95% - 100%)          Physical exam:   Gen: No acute distress  Resp: Breathing comfortably on room air    Motor exam:          Upper extremity         C5 (Shoulder Abd)    C6 (Elbow flex)   C7 (Elbow ext)   C8 (Finger flex) T1 (finger abd)         R         5/5                 5/5                       5/5                      5/5                         5/5          L          5/5                 5/5                      5/5                      5/5                         5/5                   Lower extremity           L2 (Hip flex)  L3 (knee ext)  L4 (Dorsi flex)  L5 (EHL)  S1 (Plantar flex)                                               R        5/5            5/5             5/5                    5/5          5/5      L        5/5            5/5             5/5                   5/5           5/5      Sensory exam:                        C5      C6      C7      C8       T1          RIGHT          2         2        2         2         2          (0=absent, 1=impaired, 2=normal, NT=not testable)  LEFT             2         2        2         2         2          (0=absent, 1=impaired, 2=normal, NT=not testable)                          L2      L3     L4     L5       S1          RIGHT          2         2        2         2         2          (0=absent, 1=impaired, 2=normal, NT=not testable)  LEFT             2         2        2         2         2          (0=absent, 1=impaired, 2=normal, NT=not testable)     LABS:                        10.2   8.65  )-----------( 319      ( 12 Jun 2025 06:47 )             30.9     06-12    136  |  100  |  21  ----------------------------<  116[H]  3.8   |  25  |  0.68    Ca    8.7      12 Jun 2025 06:47  Phos  3.3     06-12  Mg     2.00     06-12        Urinalysis Basic - ( 12 Jun 2025 06:47 )    Color: x / Appearance: x / SG: x / pH: x  Gluc: 116 mg/dL / Ketone: x  / Bili: x / Urobili: x   Blood: x / Protein: x / Nitrite: x   Leuk Esterase: x / RBC: x / WBC x   Sq Epi: x / Non Sq Epi: x / Bacteria: x              Assessment/Plan:     75yFemale s/p T4-pelvis post lami, PSF. Recovering appropriately    -pain control prn  -incentive spirometry  -DVT ppx: SCDs, lovenox   -PT/OT  -care per neurosurgery  -FU drains w prs    Sukumar Amador PGY-2    For any questions please contact the on call orthopedic surgery team, please do not reach out via teams.  [facility] pager: [phone]  [facility] pager: [phone]  [facility] pager: [phone]

## 2025-06-13 NOTE — PROVIDER CONTACT NOTE (OTHER) - NAME OF MD/NP/PA/DO NOTIFIED:
Bruna Fraga, [facility] [address]
Philly Mcfarland
Bruna Fraga, [facility] [address]

## 2025-06-13 NOTE — PROGRESS NOTE ADULT - ASSESSMENT
74 y/o female PMH of HTN, SILVANA, PAF on Eliquis PE (in 1980s) OA, hiatal hernia (repair 2023) gastroparesis, peripheral neuropathy, SILVANA on CPAP, and extensive prior spinal surgery (last in 09/2024-T9 partial corpectomy, had paraspinal abscess drained in 10/2024) presented as SDA for T3 to pelvis fusion and instrumention with L4 PSO on 6/4/25.     6/4 OR for T3 to pelvis fusion and instrumention with L4 PSO, with Dr. Stroud (neurosurgery), Dr. Gray (ortho), and Dr. Bolton (plastics), closed with nylons. 4uPRBCs given intraop, has tim bui, drains per plastics. Low pressures at end of case, pt required increasing phenylephrine intraop. 500 cc LR bolus given for increasing vasopressor requirements and elevated lactate.     6/5 POD 1, intubated and sedated (partially weaned for exam), MAEx4 spont under wrist restraints, incision c/d/i. On ancef 24h postop. Postop Hg 11.0, ramya /70.  6/7 POD 3,  H/H 8.2/23.4, trending cbc every 12h as long as patient is hemodynamically stable, hypokalemic this morning to 2.9 given potassium chloride tablets for repletion. On exam, Ms. French is more alert to person and place.   6/8 POD 4, H/H stable, exam improving. Deferring MRI for now as exam is improving and less concern for stroke. Daily H/H to decrease blood draws.   6/9 POD 5, Pending repeat H/H, now on floor, lower half of dressing saturated so dressing replaced.   6/10: pod 6, H&H stable, mri brain  Acute/subacute bilateral PCA distribution infarcts with associated, neurology called, drains per plastics (2 drains d'cd)  6/11 POD7, H&H stable, RT lower drain per plastics, Rapid response and code stroke called 2/2 worse AMS, CTH/CTA head/neck done, b/l PCA infarcts similar to MRI findings, no new acute findings. Neuro following. Subsequent episode of aphasia, rpt CTH stable. Restarted on eliquis 5mg BID and metoprolol 100mg BID.   6/12 POD 8, exam improved, no aphasia, pending CBC, RADHA drain dc'd. cont eliquis and metoprolol. pending EEG  6/13 POD 9  EEG neg for seizures, lump noted on the upper right back near incision, non tender to palpation will - will obtain CT thoracic spine. Changed dressing.

## 2025-06-13 NOTE — PROGRESS NOTE ADULT - ASSESSMENT
76 yo F w/ Hx HTN, PAF on eliquis, multiple spine surgery last complicated by paraspinal abscess and chronic antibiotics suppression p/f spine surgery after twisting her back and found to have a T9 compression fracture c/b post operative blood loss anemia and CVA. noted bulge at surgical site

## 2025-06-13 NOTE — PROGRESS NOTE ADULT - SUBJECTIVE AND OBJECTIVE BOX
Patient is a 75y old  Female who presents with a chief complaint of SDA (12 Jun 2025 04:16)      Interval history: oriented 2-3 today, states she hopes to go to rehab. Per son patient answers most questions appropriately,     REVIEW OF SYSTEMS  weakness    PAST MEDICAL & SURGICAL HISTORY  H/O seasonal allergies    History of pulmonary embolism    Restless leg syndrome    GERD (gastroesophageal reflux disease)    HTN (hypertension)    OA (osteoarthritis)    Fungal infection of skin    H/O scoliosis    Essential hypertension    Depression    Osteoporosis    Right hip pain    2019 novel coronavirus disease (COVID-19)    Hiatal hernia    History of chronic pain    Peripheral neuropathy    Chronic constipation    Chronic GERD    Closed fracture of thoracic vertebra    MSSA (methicillin susceptible Staphylococcus aureus) infection    SILVANA on CPAP    Paroxysmal atrial fibrillation    PFO (patent foramen ovale)    Hearing loss    Postlaminectomy syndrome    Delayed gastric emptying    Anemia    DM2 (diabetes mellitus, type 2)    S/P repair of paraesophageal hernia    S/P spinal fusion    Scoliosis    S/P spinal surgery    Removal of pin, plate, abigail, or screw    S/P hysterectomy    S/P hip replacement    S/P shoulder surgery    S/P dilatation and curettage    H/O arthroscopy of knee    H/O total knee replacement, right    S/P cataract surgery    History of bilateral hip replacements    History of repair of hiatal hernia       CURRENT FUNCTIONAL STATUS  6/12       Bed Mobility  Bed Mobility Training Rehab Potential: good, to achieve stated therapy goals  Bed Mobility Training Symptoms Noted During/After Treatment: none  Bed Mobility Training Rolling/Turning: maximum assist (25% patient effort);  1 person assist;  bed rails  Bed Mobility Training Sit-to-Supine: maximum assist (25% patient effort);  2 person assist  Bed Mobility Training Supine-to-Sit: maximum assist (25% patient effort);  2 person assist  Bed Mobility Training Limitations: impaired balance;  decreased strength    Sit-Stand Transfer Training  Sit-to-Stand Transfer Training Rehab Potential: good, to achieve stated therapy goals  Sit-to-Stand Transfer Training Symptoms Noted During/After Treatment: none  Transfer Training Sit-to-Stand Transfer: moderate assist (50% patient effort);  2 person assist;  full weight-bearing   non-powered sit to stand lift (doug stedy)   Transfer Training Stand-to-Sit Transfer: moderate assist (50% patient effort);  2 person assist;  full weight-bearing   non-powered sit to stand lift (doug stedy)   Sit-to-Stand Transfer Training Transfer Safety Analysis: impaired balance;  decreased strength;  non-powered sit to stand lift (doug stedy)           FAMILY HISTORY   Family history of abdominal aortic aneurysm (AAA) (Mother)        RECENT LABS/IMAGING  CBC Full  -  ( 13 Jun 2025 07:29 )  WBC Count : 8.63 K/uL  RBC Count : 3.42 M/uL  Hemoglobin : 9.8 g/dL  Hematocrit : 30.5 %  Platelet Count - Automated : 378 K/uL  Mean Cell Volume : 89.2 fL  Mean Cell Hemoglobin : 28.7 pg  Mean Cell Hemoglobin Concentration : 32.1 g/dL  Auto Neutrophil # : x  Auto Lymphocyte # : x  Auto Monocyte # : x  Auto Eosinophil # : x  Auto Basophil # : x  Auto Neutrophil % : x  Auto Lymphocyte % : x  Auto Monocyte % : x  Auto Eosinophil % : x  Auto Basophil % : x    06-13    136  |  100  |  20  ----------------------------<  129[H]  3.5   |  21[L]  |  0.70    Ca    8.7      13 Jun 2025 07:29  Phos  3.1     06-13  Mg     1.90     06-13      Urinalysis Basic - ( 13 Jun 2025 07:29 )    Color: x / Appearance: x / SG: x / pH: x  Gluc: 129 mg/dL / Ketone: x  / Bili: x / Urobili: x   Blood: x / Protein: x / Nitrite: x   Leuk Esterase: x / RBC: x / WBC x   Sq Epi: x / Non Sq Epi: x / Bacteria: x        VITALS  T(C): 36.9 (06-13-25 @ 12:00), Max: 37.2 (06-12-25 @ 23:52)  HR: 84 (06-13-25 @ 12:00) (84 - 102)  BP: 109/57 (06-13-25 @ 12:00) (108/54 - 144/75)  RR: 18 (06-13-25 @ 12:00) (18 - 18)  SpO2: 100% (06-13-25 @ 12:00) (95% - 100%)  Wt(kg): --    ALLERGIES  Dilantin (Urticaria)  penicillins (Urticaria)  erythromycin (Stomach Upset; Vomiting; Nausea)      MEDICATIONS   acetaminophen     Tablet .. 650 milliGRAM(s) Oral every 6 hours PRN  apixaban 5 milliGRAM(s) Oral every 12 hours  atorvastatin 40 milliGRAM(s) Oral at bedtime  bacitracin   Ointment 1 Application(s) Topical daily  chlorhexidine 2% Cloths 1 Application(s) Topical daily  cyclobenzaprine 10 milliGRAM(s) Oral two times a day  insulin lispro (ADMELOG) corrective regimen sliding scale   SubCutaneous Before meals and at bedtime  metoprolol tartrate 100 milliGRAM(s) Oral two times a day  naloxone Injectable 0.1 milliGRAM(s) IV Push every 3 minutes PRN  ondansetron Injectable 4 milliGRAM(s) IV Push every 6 hours PRN  oxyCODONE    IR 5 milliGRAM(s) Oral every 6 hours PRN  oxyCODONE    IR 10 milliGRAM(s) Oral every 6 hours PRN  pantoprazole  Injectable 40 milliGRAM(s) IV Push daily  polyethylene glycol 3350 17 Gram(s) Oral daily  povidone iodine 10% Solution 1 Application(s) Topical daily  pramipexole 1.25 milliGRAM(s) Oral three times a day  pregabalin 100 milliGRAM(s) Oral two times a day  senna 2 Tablet(s) Oral at bedtime  sodium chloride 0.9% lock flush 3 milliLiter(s) IV Push every 8 hours      ----------------------------------------------------------------------------------------  PHYSICAL EXAM  Constitutional - NAD, Comfortable  HEENT - ENCAT  Chest - no respiratory distress   Cardiovascular - RRR, S1S2   Abdomen -  Soft, NTND  Extremities -  No calf tenderness   Neurologic Exam -  trace tongue deviation to the right                  Cognitive - Awake, Alert, AAO to self, place , month but not year (states 2026)     Communication - fluent       Motor -                      LEFT    UE - 5/5                    RIGHT UE - 4+/5                    LEFT    LE - HF 3/5, KE 2/5                     RIGHT LE - HF 2/5, KE 2/5          Balance - WNL Static  Psychiatric - Mood stable, Affect WNL  ----------------------------------------------------------------------------------------  ASSESSMENT/PLAN  76 yo f s/p T4-pelvis post lami, PSF on 6/4  now also with CVA during admission  PT for bed mobility, transfers, ambulation as tolerated  out of bed to chair as tolerated   OT for adls  Pain -oxy ir prn  DVT PPX - lovenox  Dispo- patient has had decline in function in past few days, discussed with son.  recommend subacute rehab when medically cleared    Total time spent to review relevant records and imaging results, examine patient, complete documentation, and when applicable discuss the case with the patient, family, , social workers, and medical team:  35 minutes

## 2025-06-13 NOTE — PROVIDER CONTACT NOTE (OTHER) - SITUATION
Pt. noted to have a lump by the incision site
Pt. is complaining of feeling "woozy and lightheaded"
Patient has a lump at the top of her spine and has a new scab on her right shoulder

## 2025-06-13 NOTE — PROGRESS NOTE ADULT - PROBLEM SELECTOR PLAN 1
- Neuro checks q4  - TLSO when OOB  - Daily CBC/BMP  - Incentive spirometry  - DVT ppx   - Bowel regimen  - cont eliquis 5mg BID and metoprolol 100mg BID   - CT thoracic spine       Pager [phone]   Case discussed with attending neurosurgeon Dr. Stroud.

## 2025-06-13 NOTE — ADVANCED PRACTICE NURSE CONSULT - ASSESSMENT
General: A&Ox3, bedbound, able to turn with 1 assist, continent of urine and stool. Skin warm, dry with increased moisture in intertriginous folds, adequate skin turgor, scattered areas of hyperpigmentation and hypopigmentation, scattered areas of ecchymosis (without hematoma) on bilateral upper extremities. Blanchable erythema on bilateral heels. Right face cheek with small dry stable nondraining scab, patient's son at bedside reports it is from friction of a face mask.     Right posterior shoulder - scabbed over abrasion, presents as dry serous crust [id number], no drainage, no odor. Periwound with no erythema, no increased warmth, no edema, no induration, no fluctuance no signs or symptoms of overt skin infection. Goals of care: antimicrobial support, monitor for tissue type changes.    General: A&Ox3, bedbound, able to turn with 1 assist, continent of urine and stool. Skin warm, dry with increased moisture in intertriginous folds, adequate skin turgor, scattered areas of hyperpigmentation and hypopigmentation, scattered areas of ecchymosis (without hematoma) on bilateral upper extremities. Blanchable erythema on bilateral heels. Right face cheek with small dry stable nondraining scab, patient's son at bedside reports it is from friction of a face mask. Thoracic to lumbar dressing s/p surgery clean dry intact. Managed by surgical team.    Right posterior shoulder - scabbed over abrasion, presents as dry serous crust [id number], no drainage, no odor. Periwound with no erythema, no increased warmth, no edema, no induration, no fluctuance no signs or symptoms of overt skin infection. Goals of care: antimicrobial support, monitor for tissue type changes.

## 2025-06-13 NOTE — PROGRESS NOTE ADULT - PROBLEM SELECTOR PLAN 8
- Home regimen : on celebrex and Dilaudid PRN lyrica and flexeril  - off celebrex;  on lyrica and flexeril.
- Home regimen : on celebrex and Dilaudid PRN lyrica and flexeril  - off celebrex;  on lyrica and flexeril.

## 2025-06-13 NOTE — ADVANCED PRACTICE NURSE CONSULT - REASON FOR CONSULT
Patient seen on skin care rounds after wound care referral received for assessment of skin impairment and recommendations of topical management of skin tear. Patient is a 76 y/o female PMH of HTN, SILVANA, PAF on Eliquis PE (in 1980s) OA, hiatal hernia (repair 2023) gastroparesis, peripheral neuropathy, SILVANA on CPAP, and extensive prior spinal surgery now s/p 6/4 Open lumbar laminectomy, Posterior thoracic laminectomy, T3-pelvis decompression/fusion with L4 pedicle osteotomy and Myocutaneous flap closure with plastic surgery. Patient required increasing amounts of phenylephrine intra-op and was therefore not extubated at case end. SICU consulted for Q1 neuro-checks, ventilator management and hemodynamic monitoring. Patient extubated POD1. Chart reviewed: WBC 8.63, H/H 9.8/30.5, INR 1.04, Platelets 378, hA1c 5.2, BMI 33.5, kylie 17.

## 2025-06-13 NOTE — ADVANCED PRACTICE NURSE CONSULT - RECOMMEDATIONS
Topical recommendations:   ---Shoulder scab: Apply betadine wipe daily, allow to dry.   ---While inpatient continue low air loss bed therapy, continue heel elevation, continue to turn & reposition per protocol, soft pillow between bony prominences, continue moisture management with barrier creams & single breathable pad, continue measures to decrease friction/shear/pressure. Continue with nutritional support as per dietary/orders.     Plan discussed with patient and son at bedside, primary RN Oksana.   Please contact Wound/Ostomy Care Service Line if we can be of further assistance (ext [phone]). Please reconsult if changes to tissue type noted.  Topical recommendations:   ---Shoulder scab: Apply betadine wipe daily, allow to dry.   ---While inpatient continue low air loss bed therapy, continue heel elevation, continue to turn & reposition per protocol, soft pillow between bony prominences, continue moisture management with barrier creams & single breathable pad, continue measures to decrease friction/shear/pressure. Continue with nutritional support as per dietary/orders.     Plan discussed with patient and son at bedside, primary RN Oksana, Neurosurgery team.   Please contact Wound/Ostomy Care Service Line if we can be of further assistance (ext [phone]). Please reconsult if changes to tissue type noted.

## 2025-06-13 NOTE — PROGRESS NOTE ADULT - PROBLEM SELECTOR PLAN 4
- poss due to acute CVA and surgery   - as per son at baseline does have cognitive impairment w/ forget fullness but usually is AOX3.  - improving
- poss due to acute CVA and surgery   - as per son at baseline does have cognitive impairment w/ forget fullness but usually is AOX3.  - improved

## 2025-06-13 NOTE — PROGRESS NOTE ADULT - PROBLEM SELECTOR PLAN 3
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 RT LE weakness   - c/w home statin   - CT head b/l occipital and thalamic infarcts are not as well-visualized currently compared to prior study likely due to   differences in technique. No evidence for hemorrhagic transformation.  - MRI unlikely to    - DOAC restarted   - EEG- no overt seizures noted   - f/u Neuro recs.
- EBL >1L  - s/p 8 units prbc last transfusion 6/6 ; 1 u cryo1 u FFP and 1 U platelets  - monitor CBC.

## 2025-06-13 NOTE — CHART NOTE - NSCHARTNOTEFT_GEN_A_CORE
76 y/o female PMH of HTN, SILVANA, PAF on Eliquis PE (in 1980s) OA, hiatal hernia (repair 2023) gastroparesis, peripheral neuropathy, SILVANA on CPAP, and extensive prior spinal surgery, admitted s/p T4-Pelvis posterior laminectomy and fusion. Neurology consulted for MRI Brain finding of acute/subacute bilateral occipital infarcts. Patient is unaware she had a stroke and denies any recent changes in vision, weakness, numbness, or speech changes. Patient has a history of paroxysmal afib but was taken off of eliquis in a few months ago since she reported did not have anymore episodes of afib.      Impression: Neurologically improved with mental status since 6/11, when seen on 6/12. R inferior quadrantanopia due to bilateral occipital infarcts, mechanism likely cardioembolic in setting of Afib no on AC.     Recommendations:  [] off EEG, no clinical signs of seizures, no need for AEDs for now, can follow up as outpatient at [address] [place] [address].    [x] eliquis 5mg BID started on 6/11  [x] TTE w/o bubble   [x] LDL 69, HbA1c 5.2  [x] Lovenox SQ and SCDs for DVT prophylaxis   [x] BP goal: normotensive to 140/80  [x] Telemonitoring  [x] Stroke neurological checks and vital signs Q4  [x] PT/OT - inpatient rehab @ [facility]   [x] Regular diet as tolerated    [] Patient should follow up with Stroke NP, Mala Hopper or Natalya Michael, in clinic at [address] [place] [address], [phone]. Please email [email or web address] w/ basic PHI.   [] From neurovascular standpoint patient cleared for discharge     Please call with questions: [phone]

## 2025-06-13 NOTE — PROGRESS NOTE ADULT - PROBLEM SELECTOR PLAN 7
- has not been on CPAP last few weeks prior to surgery due to sialadenitis  - son to bring in CPAP machine.
- has not been on CPAP last few weeks prior to surgery due to sialadenitis  - son to bring in CPAP machine.

## 2025-06-13 NOTE — PROGRESS NOTE ADULT - SUBJECTIVE AND OBJECTIVE BOX
I left a voicemail with the Pap smear and HPV results  They are both negative  As long as the cervical biopsy does not show dysplasia, she can have a repeat Pap with HPV in 12 months  Plastic Surgery Progress Note (pg [facility]: [id number], [facility]: [phone])    SUBJECTIVE  The patient was seen and examined. No acute events overnight. Pain controlled, afebrile w/ stable vitals.     OBJECTIVE  ___________________________________________________  VITAL SIGNS / I&O's   Vital Signs Last 24 Hrs  T(C): 37.1 (13 Jun 2025 05:10), Max: 37.2 (12 Jun 2025 23:52)  T(F): 98.8 (13 Jun 2025 05:10), Max: 98.9 (12 Jun 2025 23:52)  HR: 87 (12 Jun 2025 23:52) (87 - 102)  BP: 134/61 (13 Jun 2025 05:10) (107/50 - 144/75)  BP(mean): --  RR: 18 (13 Jun 2025 05:10) (18 - 18)  SpO2: 100% (13 Jun 2025 05:10) (95% - 100%)    Parameters below as of 13 Jun 2025 05:10  Patient On (Oxygen Delivery Method): room air          12 Jun 2025 07:01  -  13 Jun 2025 07:00  --------------------------------------------------------  IN:    Oral Fluid: 760 mL  Total IN: 760 mL    OUT:    Voided (mL): 1250 mL  Total OUT: 1250 mL    Total NET: -490 mL        ___________________________________________________  PHYSICAL EXAM    -- CONSTITUTIONAL: NAD, lying in bed  -- NEURO: Awake, alert  -- HEENT: NC/AT, mucous membranes moist  -- NECK: Soft, no asymmetry  -- PULM: Non-labored respirations, equal chest rise bilaterally  -- BACK: Soft, flat. Dressing c/d/i.   -- EXTREMITIES: Warm and well perfused  -- PSYCH: Affect normal, A&Ox3    ___________________________________________________  LABS                        9.8    8.63  )-----------( 378      ( 13 Jun 2025 07:29 )             30.5     12 Jun 2025 06:47    136    |  100    |  21     ----------------------------<  116    3.8     |  25     |  0.68     Ca    8.7        12 Jun 2025 06:47  Phos  3.3       12 Jun 2025 06:47  Mg     2.00      12 Jun 2025 06:47        CAPILLARY BLOOD GLUCOSE      POCT Blood Glucose.: 127 mg/dL (13 Jun 2025 08:12)  POCT Blood Glucose.: 109 mg/dL (12 Jun 2025 21:10)  POCT Blood Glucose.: 118 mg/dL (12 Jun 2025 17:03)  POCT Blood Glucose.: 134 mg/dL (12 Jun 2025 12:13)        Urinalysis Basic - ( 12 Jun 2025 06:47 )    Color: x / Appearance: x / SG: x / pH: x  Gluc: 116 mg/dL / Ketone: x  / Bili: x / Urobili: x   Blood: x / Protein: x / Nitrite: x   Leuk Esterase: x / RBC: x / WBC x   Sq Epi: x / Non Sq Epi: x / Bacteria: x      ___________________________________________________  MICRO  Recent Cultures:    ___________________________________________________  MEDICATIONS  (STANDING):  apixaban 5 milliGRAM(s) Oral every 12 hours  atorvastatin 40 milliGRAM(s) Oral at bedtime  bacitracin   Ointment 1 Application(s) Topical daily  chlorhexidine 2% Cloths 1 Application(s) Topical daily  cyclobenzaprine 10 milliGRAM(s) Oral two times a day  insulin lispro (ADMELOG) corrective regimen sliding scale   SubCutaneous Before meals and at bedtime  metoprolol tartrate 100 milliGRAM(s) Oral two times a day  pantoprazole  Injectable 40 milliGRAM(s) IV Push daily  polyethylene glycol 3350 17 Gram(s) Oral daily  pramipexole 1.25 milliGRAM(s) Oral three times a day  pregabalin 100 milliGRAM(s) Oral two times a day  senna 2 Tablet(s) Oral at bedtime  sodium chloride 0.9% lock flush 3 milliLiter(s) IV Push every 8 hours    MEDICATIONS  (PRN):  acetaminophen     Tablet .. 650 milliGRAM(s) Oral every 6 hours PRN Temp greater or equal to 38C (100.4F), Mild Pain (1 - 3)  naloxone Injectable 0.1 milliGRAM(s) IV Push every 3 minutes PRN For ANY of the following changes in patient status:  A. RR LESS THAN 10 breaths per minute, B. Oxygen saturation LESS THAN 90%, C. Sedation score of 6  ondansetron Injectable 4 milliGRAM(s) IV Push every 6 hours PRN Nausea  oxyCODONE    IR 5 milliGRAM(s) Oral every 6 hours PRN Moderate Pain (4 - 6)  oxyCODONE    IR 10 milliGRAM(s) Oral every 6 hours PRN Severe Pain (7 - 10)

## 2025-06-13 NOTE — PROGRESS NOTE ADULT - ASSESSMENT
Impression: Primary open-angle glaucoma, bilateral, moderate stage: M60.3093. OU. 
S/P SLT OU (03/10/2020) IOP OU higher today pt d/c combo gtt due to irritation OU (see below). No changes needed. OCT done today  Mild NFL thinning super>infer OD, Mild NFL thinning infer>super OS Plan: Continue to monitor. Pt states she d/c Dorzolamide/Timolol due to irritation, okay'd by Dr. Linus Alvarez. Continue Lumigan QHS OU. Start Rocklatan QHS OU. PT on MMT. RTC 3 weeks IOP check. Recommend 6 month appt due to COVID -19. Dr. Montrell Marte on medical leave. Will continue to monitor with Dr. Montrell Marte. VIRGINIA HUFFMAN is a 75yFemale s/p PSF with closure 6/4    - Gauze/tegaderm dressing  - Nylons to remain on until followup with Dr. Bolton  - Pain control  - Advance diet as tolerated, ensure pt taking adequate protein  - Appreciate rest of care per primary team    Plastic Surgery  [facility]: [id number]  [facility]: [phone]

## 2025-06-14 LAB
ANION GAP SERPL CALC-SCNC: 15 MMOL/L — HIGH (ref 7–14)
BUN SERPL-MCNC: 17 MG/DL — SIGNIFICANT CHANGE UP (ref 7–23)
CALCIUM SERPL-MCNC: 9 MG/DL — SIGNIFICANT CHANGE UP (ref 8.4–10.5)
CHLORIDE SERPL-SCNC: 101 MMOL/L — SIGNIFICANT CHANGE UP (ref 98–107)
CO2 SERPL-SCNC: 22 MMOL/L — SIGNIFICANT CHANGE UP (ref 22–31)
CREAT SERPL-MCNC: 0.67 MG/DL — SIGNIFICANT CHANGE UP (ref 0.5–1.3)
EGFR: 90 ML/MIN/1.73M2 — SIGNIFICANT CHANGE UP
EGFR: 90 ML/MIN/1.73M2 — SIGNIFICANT CHANGE UP
GLUCOSE BLDC GLUCOMTR-MCNC: 119 MG/DL — HIGH (ref 70–99)
GLUCOSE BLDC GLUCOMTR-MCNC: 121 MG/DL — HIGH (ref 70–99)
GLUCOSE BLDC GLUCOMTR-MCNC: 96 MG/DL — SIGNIFICANT CHANGE UP (ref 70–99)
GLUCOSE BLDC GLUCOMTR-MCNC: 98 MG/DL — SIGNIFICANT CHANGE UP (ref 70–99)
GLUCOSE SERPL-MCNC: 113 MG/DL — HIGH (ref 70–99)
HCT VFR BLD CALC: 31.3 % — LOW (ref 34.5–45)
HGB BLD-MCNC: 10.1 G/DL — LOW (ref 11.5–15.5)
MAGNESIUM SERPL-MCNC: 2 MG/DL — SIGNIFICANT CHANGE UP (ref 1.6–2.6)
MCHC RBC-ENTMCNC: 28.9 PG — SIGNIFICANT CHANGE UP (ref 27–34)
MCHC RBC-ENTMCNC: 32.3 G/DL — SIGNIFICANT CHANGE UP (ref 32–36)
MCV RBC AUTO: 89.7 FL — SIGNIFICANT CHANGE UP (ref 80–100)
NRBC # BLD AUTO: 0 K/UL — SIGNIFICANT CHANGE UP (ref 0–0)
NRBC # FLD: 0 K/UL — SIGNIFICANT CHANGE UP (ref 0–0)
NRBC BLD AUTO-RTO: 0 /100 WBCS — SIGNIFICANT CHANGE UP (ref 0–0)
PHOSPHATE SERPL-MCNC: 2.7 MG/DL — SIGNIFICANT CHANGE UP (ref 2.5–4.5)
PLATELET # BLD AUTO: 423 K/UL — HIGH (ref 150–400)
POTASSIUM SERPL-MCNC: 3.2 MMOL/L — LOW (ref 3.5–5.3)
POTASSIUM SERPL-SCNC: 3.2 MMOL/L — LOW (ref 3.5–5.3)
RBC # BLD: 3.49 M/UL — LOW (ref 3.8–5.2)
RBC # FLD: 14.4 % — SIGNIFICANT CHANGE UP (ref 10.3–14.5)
SODIUM SERPL-SCNC: 138 MMOL/L — SIGNIFICANT CHANGE UP (ref 135–145)
WBC # BLD: 6.92 K/UL — SIGNIFICANT CHANGE UP (ref 3.8–10.5)
WBC # FLD AUTO: 6.92 K/UL — SIGNIFICANT CHANGE UP (ref 3.8–10.5)

## 2025-06-14 PROCEDURE — 99233 SBSQ HOSP IP/OBS HIGH 50: CPT | Mod: GC

## 2025-06-14 RX ORDER — PRAMIPEXOLE DIHYDROCHLORIDE 1 MG/1
1.25 TABLET ORAL THREE TIMES A DAY
Refills: 0 | Status: DISCONTINUED | OUTPATIENT
Start: 2025-06-14 | End: 2025-06-17

## 2025-06-14 RX ORDER — PREGABALIN 50 MG/1
100 CAPSULE ORAL
Refills: 0 | Status: DISCONTINUED | OUTPATIENT
Start: 2025-06-14 | End: 2025-06-17

## 2025-06-14 RX ADMIN — PRAMIPEXOLE DIHYDROCHLORIDE 1.25 MILLIGRAM(S): 1 TABLET ORAL at 14:09

## 2025-06-14 RX ADMIN — METOPROLOL SUCCINATE 100 MILLIGRAM(S): 50 TABLET, EXTENDED RELEASE ORAL at 17:48

## 2025-06-14 RX ADMIN — PREGABALIN 100 MILLIGRAM(S): 50 CAPSULE ORAL at 05:12

## 2025-06-14 RX ADMIN — APIXABAN 5 MILLIGRAM(S): 2.5 TABLET, FILM COATED ORAL at 05:12

## 2025-06-14 RX ADMIN — Medication 40 MILLIGRAM(S): at 12:39

## 2025-06-14 RX ADMIN — APIXABAN 5 MILLIGRAM(S): 2.5 TABLET, FILM COATED ORAL at 17:47

## 2025-06-14 RX ADMIN — Medication 3 MILLILITER(S): at 14:00

## 2025-06-14 RX ADMIN — PRAMIPEXOLE DIHYDROCHLORIDE 1.25 MILLIGRAM(S): 1 TABLET ORAL at 23:18

## 2025-06-14 RX ADMIN — Medication 1 APPLICATION(S): at 12:39

## 2025-06-14 RX ADMIN — PREGABALIN 100 MILLIGRAM(S): 50 CAPSULE ORAL at 17:47

## 2025-06-14 RX ADMIN — Medication 3 MILLILITER(S): at 21:08

## 2025-06-14 RX ADMIN — Medication 1 APPLICATION(S): at 12:41

## 2025-06-14 RX ADMIN — PRAMIPEXOLE DIHYDROCHLORIDE 1.25 MILLIGRAM(S): 1 TABLET ORAL at 05:58

## 2025-06-14 RX ADMIN — ATORVASTATIN CALCIUM 40 MILLIGRAM(S): 80 TABLET, FILM COATED ORAL at 21:36

## 2025-06-14 RX ADMIN — OXYCODONE HYDROCHLORIDE 10 MILLIGRAM(S): 30 TABLET ORAL at 15:53

## 2025-06-14 RX ADMIN — OXYCODONE HYDROCHLORIDE 10 MILLIGRAM(S): 30 TABLET ORAL at 14:53

## 2025-06-14 RX ADMIN — CYCLOBENZAPRINE HYDROCHLORIDE 10 MILLIGRAM(S): 15 CAPSULE, EXTENDED RELEASE ORAL at 17:47

## 2025-06-14 RX ADMIN — POLYETHYLENE GLYCOL 3350 17 GRAM(S): 17 POWDER, FOR SOLUTION ORAL at 12:38

## 2025-06-14 RX ADMIN — Medication 2 TABLET(S): at 21:36

## 2025-06-14 RX ADMIN — CYCLOBENZAPRINE HYDROCHLORIDE 10 MILLIGRAM(S): 15 CAPSULE, EXTENDED RELEASE ORAL at 05:12

## 2025-06-14 RX ADMIN — Medication 40 MILLIEQUIVALENT(S): at 12:41

## 2025-06-14 RX ADMIN — Medication 3 MILLILITER(S): at 05:14

## 2025-06-14 NOTE — PROGRESS NOTE ADULT - ASSESSMENT
76 y/o female PMH of HTN, SILVANA, PAF on Eliquis PE (in 1980s) OA, hiatal hernia (repair 2023) gastroparesis, peripheral neuropathy, SILVANA on CPAP, and extensive prior spinal surgery (last in 09/2024-T9 partial corpectomy, had paraspinal abscess drained in 10/2024) presented as SDA for T3 to pelvis fusion and instrumention with L4 PSO on 6/4/25.     6/4 OR for T3 to pelvis fusion and instrumention with L4 PSO, with Dr. Stroud (neurosurgery), Dr. Gray (ortho), and Dr. Bolton (plastics), closed with nylons. 4uPRBCs given intraop, has tim bui, drains per plastics. Low pressures at end of case, pt required increasing phenylephrine intraop. 500 cc LR bolus given for increasing vasopressor requirements and elevated lactate.     6/5 POD 1, intubated and sedated (partially weaned for exam), MAEx4 spont under wrist restraints, incision c/d/i. On ancef 24h postop. Postop Hg 11.0, ramya /70.  6/7 POD 3,  H/H 8.2/23.4, trending cbc every 12h as long as patient is hemodynamically stable, hypokalemic this morning to 2.9 given potassium chloride tablets for repletion. On exam, Ms. French is more alert to person and place.   6/8 POD 4, H/H stable, exam improving. Deferring MRI for now as exam is improving and less concern for stroke. Daily H/H to decrease blood draws.   6/9 POD 5, Pending repeat H/H, now on floor, lower half of dressing saturated so dressing replaced.   6/10: pod 6, H&H stable, mri brain  Acute/subacute bilateral PCA distribution infarcts with associated, neurology called, drains per plastics (2 drains d'cd)  6/11 POD7, H&H stable, RT lower drain per plastics, Rapid response and code stroke called 2/2 worse AMS, CTH/CTA head/neck done, b/l PCA infarcts similar to MRI findings, no new acute findings. Neuro following. Subsequent episode of aphasia, rpt CTH stable. Restarted on eliquis 5mg BID and metoprolol 100mg BID.   6/12 POD 8, exam improved, no aphasia, pending CBC, RADHA drain dc'd. cont eliquis and metoprolol. pending EEG  6/13 POD 9  EEG neg for seizures, lump noted on the upper right back near incision, non tender to palpation will - will obtain CT thoracic spine. Changed dressing.   6/14 POD 10 pending YEIMI. CT thoracic performed revealing presence of postsurgical edema/seroma, will continue to monitor.

## 2025-06-14 NOTE — PROGRESS NOTE ADULT - PROBLEM SELECTOR PLAN 1
- Neuro checks q4  - TLSO when OOB  - Daily CBC/BMP  - Incentive spirometry  - DVT ppx   - Bowel regimen  - cont eliquis 5mg BID and metoprolol 100mg BID   - CT thoracic spine completed - post surgical swelling       Pager [phone]   Case discussed with attending neurosurgeon Dr. Stroud.

## 2025-06-14 NOTE — PROGRESS NOTE ADULT - SUBJECTIVE AND OBJECTIVE BOX
PAST 24HR EVENTS: pending YEIMI. CT thoracic performed revealing presence of postsurgical edema/seroma, will continue to monitor.    Vital Signs Last 24 Hrs  T(C): 37.3 (13 Jun 2025 23:42), Max: 37.5 (13 Jun 2025 16:34)  T(F): 99.2 (13 Jun 2025 23:42), Max: 99.5 (13 Jun 2025 16:34)  HR: 81 (13 Jun 2025 23:42) (81 - 99)  BP: 106/55 (13 Jun 2025 23:42) (106/55 - 134/61)  BP(mean): --  RR: 18 (13 Jun 2025 23:42) (18 - 18)  SpO2: 95% (13 Jun 2025 23:42) (95% - 100%)    Parameters below as of 13 Jun 2025 23:42  Patient On (Oxygen Delivery Method): room air        MEDS:   acetaminophen     Tablet .. 650 milliGRAM(s) Oral every 6 hours PRN  apixaban 5 milliGRAM(s) Oral every 12 hours  atorvastatin 40 milliGRAM(s) Oral at bedtime  bacitracin   Ointment 1 Application(s) Topical daily  chlorhexidine 2% Cloths 1 Application(s) Topical daily  cyclobenzaprine 10 milliGRAM(s) Oral two times a day  insulin lispro (ADMELOG) corrective regimen sliding scale   SubCutaneous Before meals and at bedtime  metoprolol tartrate 100 milliGRAM(s) Oral two times a day  naloxone Injectable 0.1 milliGRAM(s) IV Push every 3 minutes PRN  ondansetron Injectable 4 milliGRAM(s) IV Push every 6 hours PRN  oxyCODONE    IR 5 milliGRAM(s) Oral every 6 hours PRN  oxyCODONE    IR 10 milliGRAM(s) Oral every 6 hours PRN  pantoprazole  Injectable 40 milliGRAM(s) IV Push daily  polyethylene glycol 3350 17 Gram(s) Oral daily  povidone iodine 10% Solution 1 Application(s) Topical daily  pramipexole 1.25 milliGRAM(s) Oral three times a day  pregabalin 100 milliGRAM(s) Oral two times a day  senna 2 Tablet(s) Oral at bedtime  sodium chloride 0.9% lock flush 3 milliLiter(s) IV Push every 8 hours      LABS:                        9.8    8.63  )-----------( 378      ( 13 Jun 2025 07:29 )             30.5     06-13    136  |  100  |  20  ----------------------------<  129[H]  3.5   |  21[L]  |  0.70    Ca    8.7      13 Jun 2025 07:29  Phos  3.1     06-13  Mg     1.90     06-13          RADIOLOGY:   PROCEDURE DATE:  06/13/2025          INTERPRETATION:  Noncontrast CT examination of the thoracic spine    CLINICAL INDICATION: Status post extensive thoracolumbar lumbar fusion   from T4 through the pelvis. Lump at incision site.    TECHNIQUE:  Direct axial CT scanning of the thoracic spine was obtained   without the administration of intravenous contrast.  Sagittal and coronal   reformats were provided.    COMPARISON: X-ray thoracic spine 6/9/2025. MRI thoracic spine 5/15/2025    FINDINGS:    Partial visualization of extensive posterior thoracolumbar fusion   construct.    Status post multilevel laminectomies.    Status post corpectomy of T12 with additional resection of the left   posterior elements. Status post resection of the posterior elements of L5.    Hardware is well-placed. No evidence for hardware fracture or loosening.    Compression deformity of the superior endplate of T10, likely iatrogenic   in nature.    Remaining vertebral bodies demonstrate normal height. Facet alignment is   maintained. Exaggeration of the thoracic kyphosis and lumbar lordosis.    Evaluation of the spinal canal contents is markedly limited due to CT   modality and streak artifact from spinal hardware.    Midline ill-defined low density region within the midline subcutaneous   soft tissues extending from C6 through T4 . A few small locules of air   within this region. Findings may represent the presence of postsurgical  edema/seroma. Superinfection is not excluded.    IMPRESSION:    Extensive postsurgical changes as described.  Hardware is well-placed. No evidence for hardware fracture or loosening.    Evaluation of the spinal canal contents is markedly limited due to CT   modality and streak artifact from spinal hardware.    Midline ill-defined low density region within the midline subcutaneous   soft tissues extending from C6 through T4 . A few small locules of air   within this region. Findings may represent the presence of postsurgical   edema/seroma. Superinfection is not excluded.            --- End of Report ---            HELENE YEPEZ MD; Attending Radiologist  This document has been electronically signed. Jun 13 2025  1:58PM    PHYSICAL EXAM:  AOx2 to name and place, Following Commands, Face symmetrical  EOMI, PERRL, CN 2-12 intact     RUE MOTOR:   Delt 5/5  Bicep 5/5  Tricep 5/5      HG 5/5      LUE MOTOR:   Delt 5/5  Bicep 5/5   Tricep 5/5     HG 5/5     RLE MOTOR:   HF 5/5                    KE 5/5         DF 5/5         PF 5/5    EHL 5/5    LLE MOTOR:   HF 5/5       KE 5/5            DF 5/5            PF 5/5       EHL 5/5      SENSATION: intact to light touch     REFLEXES:  No clonus  No Hoffmans    Incision c/d/i  lump noted in upper right portion of incision - edema

## 2025-06-14 NOTE — PROGRESS NOTE ADULT - SUBJECTIVE AND OBJECTIVE BOX
[facility] Medicine  Pager [phone]    Patient is a 75y old  Female who presents with a chief complaint of SDA (12 Jun 2025 04:16)      INTERVAL HPI/OVERNIGHT EVENTS:    MEDICATIONS  (STANDING):  apixaban 5 milliGRAM(s) Oral every 12 hours  atorvastatin 40 milliGRAM(s) Oral at bedtime  bacitracin   Ointment 1 Application(s) Topical daily  chlorhexidine 2% Cloths 1 Application(s) Topical daily  cyclobenzaprine 10 milliGRAM(s) Oral two times a day  insulin lispro (ADMELOG) corrective regimen sliding scale   SubCutaneous Before meals and at bedtime  metoprolol tartrate 100 milliGRAM(s) Oral two times a day  pantoprazole  Injectable 40 milliGRAM(s) IV Push daily  polyethylene glycol 3350 17 Gram(s) Oral daily  potassium chloride    Tablet ER 40 milliEquivalent(s) Oral once  povidone iodine 10% Solution 1 Application(s) Topical daily  pramipexole 1.25 milliGRAM(s) Oral three times a day  pregabalin 100 milliGRAM(s) Oral two times a day  senna 2 Tablet(s) Oral at bedtime  sodium chloride 0.9% lock flush 3 milliLiter(s) IV Push every 8 hours    MEDICATIONS  (PRN):  acetaminophen     Tablet .. 650 milliGRAM(s) Oral every 6 hours PRN Temp greater or equal to 38C (100.4F), Mild Pain (1 - 3)  naloxone Injectable 0.1 milliGRAM(s) IV Push every 3 minutes PRN For ANY of the following changes in patient status:  A. RR LESS THAN 10 breaths per minute, B. Oxygen saturation LESS THAN 90%, C. Sedation score of 6  ondansetron Injectable 4 milliGRAM(s) IV Push every 6 hours PRN Nausea  oxyCODONE    IR 10 milliGRAM(s) Oral every 6 hours PRN Severe Pain (7 - 10)  oxyCODONE    IR 5 milliGRAM(s) Oral every 6 hours PRN Moderate Pain (4 - 6)      Allergies    Dilantin (Urticaria)  penicillins (Urticaria)    Intolerances    erythromycin (Stomach Upset; Vomiting; Nausea)      REVIEW OF SYSTEMS:  Please see interval HPI:    Vital Signs Last 24 Hrs  T(C): 36.8 (14 Jun 2025 12:00), Max: 37.5 (13 Jun 2025 16:34)  T(F): 98.3 (14 Jun 2025 12:00), Max: 99.5 (13 Jun 2025 16:34)  HR: 95 (14 Jun 2025 12:00) (77 - 99)  BP: 119/75 (14 Jun 2025 12:00) (106/55 - 133/67)  BP(mean): --  RR: 18 (14 Jun 2025 12:00) (18 - 18)  SpO2: 98% (14 Jun 2025 12:00) (95% - 98%)    Parameters below as of 14 Jun 2025 12:00  Patient On (Oxygen Delivery Method): room air      I&O's Detail    13 Jun 2025 07:01  -  14 Jun 2025 07:00  --------------------------------------------------------  IN:    Oral Fluid: 1230 mL  Total IN: 1230 mL    OUT:    Voided (mL): 1750 mL  Total OUT: 1750 mL    Total NET: -520 mL            PHYSICAL EXAM:  GENERAL:   HEAD:    EYES:   ENMT:   NECK:   NERVOUS SYSTEM:    CHEST/LUNG:   HEART:   ABDOMEN:   EXTREMITIES:    LYMPH:   SKIN:     LABS:                        10.1   6.92  )-----------( 423      ( 14 Jun 2025 07:04 )             31.3     14 Jun 2025 07:04    138    |  101    |  17     ----------------------------<  113    3.2     |  22     |  0.67     Ca    9.0        14 Jun 2025 07:04  Phos  2.7       14 Jun 2025 07:04  Mg     2.00      14 Jun 2025 07:04        CAPILLARY BLOOD GLUCOSE      POCT Blood Glucose.: 98 mg/dL (14 Jun 2025 12:23)  POCT Blood Glucose.: 96 mg/dL (14 Jun 2025 08:04)  POCT Blood Glucose.: 116 mg/dL (13 Jun 2025 21:09)  POCT Blood Glucose.: 94 mg/dL (13 Jun 2025 17:19)    BLOOD CULTURE    RADIOLOGY & ADDITIONAL TESTS:    Imaging Personally Reviewed:  [ ] YES     Consultant(s) Notes Reviewed:      Care Discussed with Consultants/Other Providers: [facility]  Pager [phone]    Patient is a 75y old  Female who presents with a chief complaint of SDA (12 Jun 2025 04:16)      INTERVAL HPI/OVERNIGHT EVENTS:  Overall doing ok, eager to get to rehab and continue to get stronger, having some LE weakness, some R hand numbness says persistent since surgery, pain controlled, denies SOB, is eating OK, no issues w/ BMs.   No other questions or concerns at this time, provided encouragement to continue to work with PT.     MEDICATIONS  (STANDING):  apixaban 5 milliGRAM(s) Oral every 12 hours  atorvastatin 40 milliGRAM(s) Oral at bedtime  bacitracin   Ointment 1 Application(s) Topical daily  chlorhexidine 2% Cloths 1 Application(s) Topical daily  cyclobenzaprine 10 milliGRAM(s) Oral two times a day  insulin lispro (ADMELOG) corrective regimen sliding scale   SubCutaneous Before meals and at bedtime  metoprolol tartrate 100 milliGRAM(s) Oral two times a day  pantoprazole  Injectable 40 milliGRAM(s) IV Push daily  polyethylene glycol 3350 17 Gram(s) Oral daily  potassium chloride    Tablet ER 40 milliEquivalent(s) Oral once  povidone iodine 10% Solution 1 Application(s) Topical daily  pramipexole 1.25 milliGRAM(s) Oral three times a day  pregabalin 100 milliGRAM(s) Oral two times a day  senna 2 Tablet(s) Oral at bedtime  sodium chloride 0.9% lock flush 3 milliLiter(s) IV Push every 8 hours    MEDICATIONS  (PRN):  acetaminophen     Tablet .. 650 milliGRAM(s) Oral every 6 hours PRN Temp greater or equal to 38C (100.4F), Mild Pain (1 - 3)  naloxone Injectable 0.1 milliGRAM(s) IV Push every 3 minutes PRN For ANY of the following changes in patient status:  A. RR LESS THAN 10 breaths per minute, B. Oxygen saturation LESS THAN 90%, C. Sedation score of 6  ondansetron Injectable 4 milliGRAM(s) IV Push every 6 hours PRN Nausea  oxyCODONE    IR 10 milliGRAM(s) Oral every 6 hours PRN Severe Pain (7 - 10)  oxyCODONE    IR 5 milliGRAM(s) Oral every 6 hours PRN Moderate Pain (4 - 6)      Allergies    Dilantin (Urticaria)  penicillins (Urticaria)    Intolerances    erythromycin (Stomach Upset; Vomiting; Nausea)      REVIEW OF SYSTEMS:  Please see interval HPI:    Vital Signs Last 24 Hrs  T(C): 36.8 (14 Jun 2025 12:00), Max: 37.5 (13 Jun 2025 16:34)  T(F): 98.3 (14 Jun 2025 12:00), Max: 99.5 (13 Jun 2025 16:34)  HR: 95 (14 Jun 2025 12:00) (77 - 99)  BP: 119/75 (14 Jun 2025 12:00) (106/55 - 133/67)  RR: 18 (14 Jun 2025 12:00) (18 - 18)  SpO2: 98% (14 Jun 2025 12:00) (95% - 98%)    Parameters below as of 14 Jun 2025 12:00  Patient On (Oxygen Delivery Method): room air      I&O's Detail    13 Jun 2025 07:01  -  14 Jun 2025 07:00  --------------------------------------------------------  IN:    Oral Fluid: 1230 mL  Total IN: 1230 mL    OUT:    Voided (mL): 1750 mL  Total OUT: 1750 mL    Total NET: -520 mL    PHYSICAL EXAM:  GENERAL: NAD, sitting in bed, about to work with staff to get out of bed  HEAD:  NC/AT  EYES: EOMI, clear sclera/conjunctiva  ENMT: MMM, hearing intact to voice  NERVOUS SYSTEM:  moving upper extremities, weakness of b/l LE hip flexion about 3/5  CHEST/LUNG: Comfortable on RA, speaking in full sentences   ABDOMEN: non-tender  EXTREMITIES:  no c/c/e  SKIN: surgical site c/d/i, some ecchymosis on R forearm    LABS:                        10.1   6.92  )-----------( 423      ( 14 Jun 2025 07:04 )             31.3     14 Jun 2025 07:04    138    |  101    |  17     ----------------------------<  113    3.2     |  22     |  0.67     Ca    9.0        14 Jun 2025 07:04  Phos  2.7       14 Jun 2025 07:04  Mg     2.00      14 Jun 2025 07:04        CAPILLARY BLOOD GLUCOSE      POCT Blood Glucose.: 98 mg/dL (14 Jun 2025 12:23)  POCT Blood Glucose.: 96 mg/dL (14 Jun 2025 08:04)  POCT Blood Glucose.: 116 mg/dL (13 Jun 2025 21:09)  POCT Blood Glucose.: 94 mg/dL (13 Jun 2025 17:19)    BLOOD CULTURE    RADIOLOGY & ADDITIONAL TESTS:    Imaging Personally Reviewed:  [ ] YES   < from: CT Thoracic Spine No Cont (06.13.25 @ 11:12) >  IMPRESSION:    Extensive postsurgical changes as described.  Hardware is well-placed. No evidence for hardware fracture or loosening.    Evaluation of the spinal canal contents is markedly limited due to CT   modality and streak artifact from spinal hardware.    Midline ill-defined low density region within the midline subcutaneous   soft tissues extending from C6 through T4 . A few small locules of air   within this region. Findings may represent the presence of postsurgical   edema/seroma. Superinfection is not excluded.      < end of copied text >      Consultant(s) Notes Reviewed:  NSx, PMR, prior hospital course    Care Discussed with Consultants/Other Providers:

## 2025-06-14 NOTE — PROGRESS NOTE ADULT - ASSESSMENT
74 yo F w/ obesity (BMI 33.5), w/ SILVANA, HTN, pAfib on eliquis, GERD, chronic pain, restless leg syndrome, prior spinal surgery c/b paraspinal abscess and bacteremia, presented for scheduled spinal surgery to manage vertebral fracture, s/p T3-pelvis with L4 pedical osteotomy and T3 pelvis fusion, t3-pelvis PSF w/ myocutaneous flap, course c/b anemia due to intraoperative blood loss and hypotension requiring transfusion, pressors and SICU care, CVA (felt to be cardioembolic), post-surgical edema/seroma, now pending YEIMI placement.     # Thoracic vertebral fracture (T9) w/ moderate to severe bilateral neural foraminal narrowing   # post-operative anemia due to acute blood loss  - s/p T3-pelvis with L4 pedical osteotomy and T3 pelvis fusion, t3-pelvis PSF w/ myocutaneous flap on 6/4  - post-op care, wound care, pain management, bowel regimen as per Nsx  - post-surgical edema/seroma noted on CT, monitoring as per Nsx  - PMR rec YEIMI  - hgb currently stable 10.1, continue to monitor, s/p multiple PRBCs, and transfusion of cyro, FFP and platelets earlier during hospitalization    # Acute CVA - suspect embolic etiology  # pAfib w/ secondary hypercoagulable state  # Essential HTN  - imaging noting infarcts  - resumed on DOAC (eliquis bid)  - HR controlled on B-blocker, BP also in acceptable range  - on statin  - no overt seizures noted on EEG  - c/w risk factor modifications, avoid hypotension as per neuro    # Obesity  # SILVANA  - encourage weight loss/lifestyle modifications  - was off cpap prior to surgery due to sialadenitis, f/u use of cpap (?son to bring in?)    # Chronic pain  # restless leg syndrome  - c/w multimodal pain regimen, c/w bowel regimen to prevent opioid induced constipation  - c/w pramipexole    # Hypokalemia - replete K    Dispo: pending YEIMI

## 2025-06-15 LAB
ANION GAP SERPL CALC-SCNC: 11 MMOL/L — SIGNIFICANT CHANGE UP (ref 7–14)
BUN SERPL-MCNC: 19 MG/DL — SIGNIFICANT CHANGE UP (ref 7–23)
CALCIUM SERPL-MCNC: 8.8 MG/DL — SIGNIFICANT CHANGE UP (ref 8.4–10.5)
CHLORIDE SERPL-SCNC: 104 MMOL/L — SIGNIFICANT CHANGE UP (ref 98–107)
CO2 SERPL-SCNC: 22 MMOL/L — SIGNIFICANT CHANGE UP (ref 22–31)
CREAT SERPL-MCNC: 0.68 MG/DL — SIGNIFICANT CHANGE UP (ref 0.5–1.3)
EGFR: 90 ML/MIN/1.73M2 — SIGNIFICANT CHANGE UP
EGFR: 90 ML/MIN/1.73M2 — SIGNIFICANT CHANGE UP
GLUCOSE BLDC GLUCOMTR-MCNC: 109 MG/DL — HIGH (ref 70–99)
GLUCOSE BLDC GLUCOMTR-MCNC: 110 MG/DL — HIGH (ref 70–99)
GLUCOSE BLDC GLUCOMTR-MCNC: 140 MG/DL — HIGH (ref 70–99)
GLUCOSE BLDC GLUCOMTR-MCNC: 85 MG/DL — SIGNIFICANT CHANGE UP (ref 70–99)
GLUCOSE SERPL-MCNC: 116 MG/DL — HIGH (ref 70–99)
HCT VFR BLD CALC: 29.8 % — LOW (ref 34.5–45)
HGB BLD-MCNC: 9.5 G/DL — LOW (ref 11.5–15.5)
MAGNESIUM SERPL-MCNC: 1.8 MG/DL — SIGNIFICANT CHANGE UP (ref 1.6–2.6)
MCHC RBC-ENTMCNC: 29 PG — SIGNIFICANT CHANGE UP (ref 27–34)
MCHC RBC-ENTMCNC: 31.9 G/DL — LOW (ref 32–36)
MCV RBC AUTO: 90.9 FL — SIGNIFICANT CHANGE UP (ref 80–100)
NRBC # BLD AUTO: 0.02 K/UL — HIGH (ref 0–0)
NRBC # FLD: 0.02 K/UL — HIGH (ref 0–0)
NRBC BLD AUTO-RTO: 0 /100 WBCS — SIGNIFICANT CHANGE UP (ref 0–0)
PHOSPHATE SERPL-MCNC: 3.1 MG/DL — SIGNIFICANT CHANGE UP (ref 2.5–4.5)
PLATELET # BLD AUTO: 411 K/UL — HIGH (ref 150–400)
POTASSIUM SERPL-MCNC: 3.4 MMOL/L — LOW (ref 3.5–5.3)
POTASSIUM SERPL-SCNC: 3.4 MMOL/L — LOW (ref 3.5–5.3)
RBC # BLD: 3.28 M/UL — LOW (ref 3.8–5.2)
RBC # FLD: 14.6 % — HIGH (ref 10.3–14.5)
SODIUM SERPL-SCNC: 137 MMOL/L — SIGNIFICANT CHANGE UP (ref 135–145)
WBC # BLD: 7.61 K/UL — SIGNIFICANT CHANGE UP (ref 3.8–10.5)
WBC # FLD AUTO: 7.61 K/UL — SIGNIFICANT CHANGE UP (ref 3.8–10.5)

## 2025-06-15 PROCEDURE — 99232 SBSQ HOSP IP/OBS MODERATE 35: CPT | Mod: GC

## 2025-06-15 PROCEDURE — 93010 ELECTROCARDIOGRAM REPORT: CPT | Mod: 76

## 2025-06-15 RX ADMIN — Medication 1 APPLICATION(S): at 12:53

## 2025-06-15 RX ADMIN — POLYETHYLENE GLYCOL 3350 17 GRAM(S): 17 POWDER, FOR SOLUTION ORAL at 12:43

## 2025-06-15 RX ADMIN — Medication 1 APPLICATION(S): at 12:43

## 2025-06-15 RX ADMIN — METOPROLOL SUCCINATE 100 MILLIGRAM(S): 50 TABLET, EXTENDED RELEASE ORAL at 17:40

## 2025-06-15 RX ADMIN — OXYCODONE HYDROCHLORIDE 10 MILLIGRAM(S): 30 TABLET ORAL at 08:29

## 2025-06-15 RX ADMIN — Medication 40 MILLIEQUIVALENT(S): at 13:29

## 2025-06-15 RX ADMIN — Medication 3 MILLILITER(S): at 13:01

## 2025-06-15 RX ADMIN — ATORVASTATIN CALCIUM 40 MILLIGRAM(S): 80 TABLET, FILM COATED ORAL at 21:53

## 2025-06-15 RX ADMIN — OXYCODONE HYDROCHLORIDE 10 MILLIGRAM(S): 30 TABLET ORAL at 09:00

## 2025-06-15 RX ADMIN — PRAMIPEXOLE DIHYDROCHLORIDE 1.25 MILLIGRAM(S): 1 TABLET ORAL at 05:40

## 2025-06-15 RX ADMIN — PREGABALIN 100 MILLIGRAM(S): 50 CAPSULE ORAL at 05:41

## 2025-06-15 RX ADMIN — PRAMIPEXOLE DIHYDROCHLORIDE 1.25 MILLIGRAM(S): 1 TABLET ORAL at 13:29

## 2025-06-15 RX ADMIN — APIXABAN 5 MILLIGRAM(S): 2.5 TABLET, FILM COATED ORAL at 05:43

## 2025-06-15 RX ADMIN — CYCLOBENZAPRINE HYDROCHLORIDE 10 MILLIGRAM(S): 15 CAPSULE, EXTENDED RELEASE ORAL at 17:40

## 2025-06-15 RX ADMIN — Medication 3 MILLILITER(S): at 21:04

## 2025-06-15 RX ADMIN — PREGABALIN 100 MILLIGRAM(S): 50 CAPSULE ORAL at 17:39

## 2025-06-15 RX ADMIN — Medication 3 MILLILITER(S): at 05:14

## 2025-06-15 RX ADMIN — CYCLOBENZAPRINE HYDROCHLORIDE 10 MILLIGRAM(S): 15 CAPSULE, EXTENDED RELEASE ORAL at 05:41

## 2025-06-15 RX ADMIN — APIXABAN 5 MILLIGRAM(S): 2.5 TABLET, FILM COATED ORAL at 17:40

## 2025-06-15 RX ADMIN — Medication 40 MILLIGRAM(S): at 12:43

## 2025-06-15 RX ADMIN — Medication 2 TABLET(S): at 21:53

## 2025-06-15 RX ADMIN — PRAMIPEXOLE DIHYDROCHLORIDE 1.25 MILLIGRAM(S): 1 TABLET ORAL at 21:52

## 2025-06-15 NOTE — PROGRESS NOTE ADULT - ASSESSMENT
76 yo F w/ obesity (BMI 33.5), w/ SILVANA, HTN, pAfib on eliquis, GERD, chronic pain, restless leg syndrome, prior spinal surgery c/b paraspinal abscess and bacteremia, presented for scheduled spinal surgery to manage vertebral fracture, s/p T3-pelvis with L4 pedical osteotomy and T3 pelvis fusion, t3-pelvis PSF w/ myocutaneous flap, course c/b anemia due to intraoperative blood loss and hypotension requiring transfusion, pressors and SICU care, CVA (felt to be cardioembolic), post-surgical edema/seroma, now pending YEIMI placement.     # Thoracic vertebral fracture (T9) w/ moderate to severe bilateral neural foraminal narrowing   # post-operative anemia due to acute blood loss  - s/p T3-pelvis with L4 pedical osteotomy and T3 pelvis fusion, t3-pelvis PSF w/ myocutaneous flap on 6/4  - post-op care, wound care, pain management, bowel regimen as per Nsx  - post-surgical edema/seroma noted on CT, monitoring as per Nsx  - PMR rec YEIMI  - hgb currently stable 9.5, continue to monitor, s/p multiple PRBCs, and transfusion of cyro, FFP and platelets earlier during hospitalization    # Acute CVA - suspect embolic etiology  # pAfib w/ secondary hypercoagulable state  # Essential HTN  - imaging noting infarcts  - resumed on DOAC (eliquis bid)  - HR controlled on B-blocker, BP also in acceptable range  - on statin  - no overt seizures noted on EEG  - c/w risk factor modifications, avoid hypotension as per neuro    # Obesity  # SILVANA  - encourage weight loss/lifestyle modifications  - was off cpap prior to surgery due to sialadenitis, f/u use of cpap (?son to bring in?), morning sleepiness/falling asleep in chair this AM could be related to SILVANA    # Chronic pain  # restless leg syndrome  - c/w multimodal pain regimen, c/w bowel regimen to prevent opioid induced constipation  - c/w pramipexole    # Hypokalemia - replete K    Dispo: pending YEIMI

## 2025-06-15 NOTE — PROGRESS NOTE ADULT - PROBLEM SELECTOR PLAN 1
- Neuro checks q4  - TLSO when OOB  - Daily CBC/BMP  - Incentive spirometry  - DVT ppx   - Bowel regimen  - cont eliquis 5mg BID and metoprolol 100mg BID   - CT thoracic spine completed - post surgical swelling       Pager [phone]   Case discussed with attending neurosurgeon Dr. Stroud. - Neuro checks q4  - TLSO when OOB  - Daily CBC/BMP  - Incentive spirometry  - Bowel regimen  - cont eliquis 5mg BID and metoprolol 100mg BID   - Pending YEIMI, SW aware     Pager [phone]   Case discussed with attending neurosurgeon Dr. Stroud.

## 2025-06-15 NOTE — PROGRESS NOTE ADULT - SUBJECTIVE AND OBJECTIVE BOX
[facility]  Pager [phone]    Patient is a 75y old  Female who presents with a chief complaint of SDA (12 Jun 2025 04:16)      INTERVAL HPI/OVERNIGHT EVENTS:    MEDICATIONS  (STANDING):  apixaban 5 milliGRAM(s) Oral every 12 hours  atorvastatin 40 milliGRAM(s) Oral at bedtime  bacitracin   Ointment 1 Application(s) Topical daily  chlorhexidine 2% Cloths 1 Application(s) Topical daily  cyclobenzaprine 10 milliGRAM(s) Oral two times a day  insulin lispro (ADMELOG) corrective regimen sliding scale   SubCutaneous Before meals and at bedtime  metoprolol tartrate 100 milliGRAM(s) Oral two times a day  pantoprazole  Injectable 40 milliGRAM(s) IV Push daily  polyethylene glycol 3350 17 Gram(s) Oral daily  povidone iodine 10% Solution 1 Application(s) Topical daily  pramipexole 1.25 milliGRAM(s) Oral three times a day  pregabalin 100 milliGRAM(s) Oral two times a day  senna 2 Tablet(s) Oral at bedtime  sodium chloride 0.9% lock flush 3 milliLiter(s) IV Push every 8 hours    MEDICATIONS  (PRN):  acetaminophen     Tablet .. 650 milliGRAM(s) Oral every 6 hours PRN Temp greater or equal to 38C (100.4F), Mild Pain (1 - 3)  naloxone Injectable 0.1 milliGRAM(s) IV Push every 3 minutes PRN For ANY of the following changes in patient status:  A. RR LESS THAN 10 breaths per minute, B. Oxygen saturation LESS THAN 90%, C. Sedation score of 6  ondansetron Injectable 4 milliGRAM(s) IV Push every 6 hours PRN Nausea  oxyCODONE    IR 5 milliGRAM(s) Oral every 6 hours PRN Moderate Pain (4 - 6)  oxyCODONE    IR 10 milliGRAM(s) Oral every 6 hours PRN Severe Pain (7 - 10)      Allergies    Dilantin (Urticaria)  penicillins (Urticaria)    Intolerances    erythromycin (Stomach Upset; Vomiting; Nausea)      REVIEW OF SYSTEMS:  Please see interval HPI:    Vital Signs Last 24 Hrs  T(C): 36.9 (15 Cj 2025 08:25), Max: 37.5 (15 Cj 2025 00:29)  T(F): 98.4 (15 Jc 2025 08:25), Max: 99.5 (15 Cj 2025 00:29)  HR: 104 (15 Cj 2025 08:25) (84 - 104)  BP: 118/60 (15 Cj 2025 08:25) (110/51 - 133/69)  BP(mean): --  RR: 19 (15 Cj 2025 08:25) (18 - 19)  SpO2: 100% (15 Cj 2025 08:25) (95% - 100%)    Parameters below as of 15 Cj 2025 08:25  Patient On (Oxygen Delivery Method): room air      I&O's Detail    14 Jun 2025 07:01  -  15 Cj 2025 07:00  --------------------------------------------------------  IN:    Oral Fluid: 1470 mL  Total IN: 1470 mL    OUT:    Voided (mL): 1750 mL  Total OUT: 1750 mL    Total NET: -280 mL      15 Cj 2025 07:01  -  15 Cj 2025 09:45  --------------------------------------------------------  IN:    Oral Fluid: 420 mL  Total IN: 420 mL    OUT:    IV PiggyBack: 0 mL    Voided (mL): 600 mL  Total OUT: 600 mL    Total NET: -180 mL            PHYSICAL EXAM:  GENERAL:   HEAD:    EYES:   ENMT:   NECK:   NERVOUS SYSTEM:    CHEST/LUNG:   HEART:   ABDOMEN:   EXTREMITIES:    LYMPH:   SKIN:     LABS:                        9.5    7.61  )-----------( 411      ( 15 Cj 2025 07:23 )             29.8     15 Cj 2025 07:58    137    |  104    |  19     ----------------------------<  116    3.4     |  22     |  0.68     Ca    8.8        15 Cj 2025 07:58  Phos  3.1       15 Cj 2025 07:58  Mg     1.80      15 Cj 2025 07:58        CAPILLARY BLOOD GLUCOSE      POCT Blood Glucose.: 109 mg/dL (15 Cj 2025 08:12)  POCT Blood Glucose.: 121 mg/dL (14 Jun 2025 21:21)  POCT Blood Glucose.: 119 mg/dL (14 Jun 2025 17:12)  POCT Blood Glucose.: 98 mg/dL (14 Jun 2025 12:23)    BLOOD CULTURE    RADIOLOGY & ADDITIONAL TESTS:    Imaging Personally Reviewed:  [ ] YES     Consultant(s) Notes Reviewed:      Care Discussed with Consultants/Other Providers: [facility]  Pager [phone]    Patient is a 75y old  Female who presents with a chief complaint of SDA (12 Jun 2025 04:16)      INTERVAL HPI/OVERNIGHT EVENTS:  Sitting in chair, intermittently falling asleep during interview.   Wanted to go home to be with her father for father's day. When asked how old he is, said 87 yo, when asked how old she is says 55...   Reoriented to hospital, year and plan for rehab, she verbalized understanding. Talks about her children and grandchildren, shows pictures on her phone.     MEDICATIONS  (STANDING):  apixaban 5 milliGRAM(s) Oral every 12 hours  atorvastatin 40 milliGRAM(s) Oral at bedtime  bacitracin   Ointment 1 Application(s) Topical daily  chlorhexidine 2% Cloths 1 Application(s) Topical daily  cyclobenzaprine 10 milliGRAM(s) Oral two times a day  insulin lispro (ADMELOG) corrective regimen sliding scale   SubCutaneous Before meals and at bedtime  metoprolol tartrate 100 milliGRAM(s) Oral two times a day  pantoprazole  Injectable 40 milliGRAM(s) IV Push daily  polyethylene glycol 3350 17 Gram(s) Oral daily  povidone iodine 10% Solution 1 Application(s) Topical daily  pramipexole 1.25 milliGRAM(s) Oral three times a day  pregabalin 100 milliGRAM(s) Oral two times a day  senna 2 Tablet(s) Oral at bedtime  sodium chloride 0.9% lock flush 3 milliLiter(s) IV Push every 8 hours    MEDICATIONS  (PRN):  acetaminophen     Tablet .. 650 milliGRAM(s) Oral every 6 hours PRN Temp greater or equal to 38C (100.4F), Mild Pain (1 - 3)  naloxone Injectable 0.1 milliGRAM(s) IV Push every 3 minutes PRN For ANY of the following changes in patient status:  A. RR LESS THAN 10 breaths per minute, B. Oxygen saturation LESS THAN 90%, C. Sedation score of 6  ondansetron Injectable 4 milliGRAM(s) IV Push every 6 hours PRN Nausea  oxyCODONE    IR 5 milliGRAM(s) Oral every 6 hours PRN Moderate Pain (4 - 6)  oxyCODONE    IR 10 milliGRAM(s) Oral every 6 hours PRN Severe Pain (7 - 10)      Allergies    Dilantin (Urticaria)  penicillins (Urticaria)    Intolerances    erythromycin (Stomach Upset; Vomiting; Nausea)      REVIEW OF SYSTEMS:  Please see interval HPI:    Vital Signs Last 24 Hrs  T(C): 36.9 (15 Cj 2025 08:25), Max: 37.5 (15 Cj 2025 00:29)  T(F): 98.4 (15 Cj 2025 08:25), Max: 99.5 (15 Jc 2025 00:29)  HR: 104 (15 Cj 2025 08:25) (84 - 104)  BP: 118/60 (15 Cj 2025 08:25) (110/51 - 133/69)  RR: 19 (15 Cj 2025 08:25) (18 - 19)  SpO2: 100% (15 Cj 2025 08:25) (95% - 100%)    Parameters below as of 15 Cj 2025 08:25  Patient On (Oxygen Delivery Method): room air      I&O's Detail    14 Jun 2025 07:01  -  15 Cj 2025 07:00  --------------------------------------------------------  IN:    Oral Fluid: 1470 mL  Total IN: 1470 mL    OUT:    Voided (mL): 1750 mL  Total OUT: 1750 mL    Total NET: -280 mL      15 Cj 2025 07:01  -  15 Cj 2025 09:45  --------------------------------------------------------  IN:    Oral Fluid: 420 mL  Total IN: 420 mL    OUT:    IV PiggyBack: 0 mL    Voided (mL): 600 mL  Total OUT: 600 mL    Total NET: -180 mL    PHYSICAL EXAM:  GENERAL: NAD, sitting in chair, intermittently falling asleep but arousable to voice  HEAD:  NC/AT  EYES: EOMI, clear sclera/conjunctiva  ENMT: MMM, hearing intact to voice  NERVOUS SYSTEM:  moving upper extremities, +LE weakness  CHEST/LUNG: Comfortable on RA, speaking in full sentences   ABDOMEN: non-tender  EXTREMITIES:  no c/c/e  SKIN: surgical site c/d/i, some ecchymosis on R forearm      LABS:                        9.5    7.61  )-----------( 411      ( 15 Cj 2025 07:23 )             29.8     15 Cj 2025 07:58    137    |  104    |  19     ----------------------------<  116    3.4     |  22     |  0.68     Ca    8.8        15 Cj 2025 07:58  Phos  3.1       15 Cj 2025 07:58  Mg     1.80      15 Cj 2025 07:58        CAPILLARY BLOOD GLUCOSE      POCT Blood Glucose.: 109 mg/dL (15 Cj 2025 08:12)  POCT Blood Glucose.: 121 mg/dL (14 Jun 2025 21:21)  POCT Blood Glucose.: 119 mg/dL (14 Jun 2025 17:12)  POCT Blood Glucose.: 98 mg/dL (14 Jun 2025 12:23)    BLOOD CULTURE    RADIOLOGY & ADDITIONAL TESTS:    Imaging Personally Reviewed:  [ ] YES     Consultant(s) Notes Reviewed:  Nsx    Care Discussed with Consultants/Other Providers:

## 2025-06-15 NOTE — PROGRESS NOTE ADULT - ASSESSMENT
74 y/o female PMH of HTN, SILVANA, PAF on Eliquis PE (in 1980s) OA, hiatal hernia (repair 2023) gastroparesis, peripheral neuropathy, SILVANA on CPAP, and extensive prior spinal surgery (last in 09/2024-T9 partial corpectomy, had paraspinal abscess drained in 10/2024) presented as SDA for T3 to pelvis fusion and instrumention with L4 PSO on 6/4/25.     6/4 OR for T3 to pelvis fusion and instrumention with L4 PSO, with Dr. Stroud (neurosurgery), Dr. Gray (ortho), and Dr. Bolton (plastics), closed with nylons. 4uPRBCs given intraop, has tim bui, drains per plastics. Low pressures at end of case, pt required increasing phenylephrine intraop. 500 cc LR bolus given for increasing vasopressor requirements and elevated lactate.     6/5 POD 1, intubated and sedated (partially weaned for exam), MAEx4 spont under wrist restraints, incision c/d/i. On ancef 24h postop. Postop Hg 11.0, ramya /70.  6/7 POD 3,  H/H 8.2/23.4, trending cbc every 12h as long as patient is hemodynamically stable, hypokalemic this morning to 2.9 given potassium chloride tablets for repletion. On exam, Ms. French is more alert to person and place.   6/8 POD 4, H/H stable, exam improving. Deferring MRI for now as exam is improving and less concern for stroke. Daily H/H to decrease blood draws.   6/9 POD 5, Pending repeat H/H, now on floor, lower half of dressing saturated so dressing replaced.   6/10: pod 6, H&H stable, mri brain  Acute/subacute bilateral PCA distribution infarcts with associated, neurology called, drains per plastics (2 drains d'cd)  6/11 POD7, H&H stable, RT lower drain per plastics, Rapid response and code stroke called 2/2 worse AMS, CTH/CTA head/neck done, b/l PCA infarcts similar to MRI findings, no new acute findings. Neuro following. Subsequent episode of aphasia, rpt CTH stable. Restarted on eliquis 5mg BID and metoprolol 100mg BID.   6/12 POD 8, exam improved, no aphasia, pending CBC, RADHA drain dc'd. cont eliquis and metoprolol. pending EEG  6/13 POD 9  EEG neg for seizures, lump noted on the upper right back near incision, non tender to palpation will - will obtain CT thoracic spine. Changed dressing.   6/14 POD 10 pending YEIMI. CT thoracic performed revealing presence of postsurgical edema/seroma, will continue to monitor.  6/14 POD 11, exam stable, pending YEIMI.

## 2025-06-15 NOTE — PROGRESS NOTE ADULT - SUBJECTIVE AND OBJECTIVE BOX
PAST 24HR EVENTS: Pt seen and examined at bedside, no acute events. Vitals stable, afebrile. Pending YEIMI.     Vital Signs Last 24 Hrs  T(C): 36.3 (14 Jun 2025 20:32), Max: 37.1 (14 Jun 2025 08:16)  T(F): 97.3 (14 Jun 2025 20:32), Max: 98.7 (14 Jun 2025 08:16)  HR: 84 (14 Jun 2025 20:32) (77 - 100)  BP: 110/51 (14 Jun 2025 20:32) (110/51 - 133/69)  BP(mean): --  RR: 18 (14 Jun 2025 20:32) (18 - 18)  SpO2: 96% (14 Jun 2025 20:32) (96% - 99%)    Parameters below as of 14 Jun 2025 20:32  Patient On (Oxygen Delivery Method): room air        MEDS:   acetaminophen     Tablet .. 650 milliGRAM(s) Oral every 6 hours PRN  apixaban 5 milliGRAM(s) Oral every 12 hours  atorvastatin 40 milliGRAM(s) Oral at bedtime  bacitracin   Ointment 1 Application(s) Topical daily  chlorhexidine 2% Cloths 1 Application(s) Topical daily  cyclobenzaprine 10 milliGRAM(s) Oral two times a day  insulin lispro (ADMELOG) corrective regimen sliding scale   SubCutaneous Before meals and at bedtime  metoprolol tartrate 100 milliGRAM(s) Oral two times a day  naloxone Injectable 0.1 milliGRAM(s) IV Push every 3 minutes PRN  ondansetron Injectable 4 milliGRAM(s) IV Push every 6 hours PRN  oxyCODONE    IR 5 milliGRAM(s) Oral every 6 hours PRN  oxyCODONE    IR 10 milliGRAM(s) Oral every 6 hours PRN  pantoprazole  Injectable 40 milliGRAM(s) IV Push daily  polyethylene glycol 3350 17 Gram(s) Oral daily  povidone iodine 10% Solution 1 Application(s) Topical daily  pramipexole 1.25 milliGRAM(s) Oral three times a day  pregabalin 100 milliGRAM(s) Oral two times a day  senna 2 Tablet(s) Oral at bedtime  sodium chloride 0.9% lock flush 3 milliLiter(s) IV Push every 8 hours      LABS:                        10.1   6.92  )-----------( 423      ( 14 Jun 2025 07:04 )             31.3     06-14    138  |  101  |  17  ----------------------------<  113[H]  3.2[L]   |  22  |  0.67    Ca    9.0      14 Jun 2025 07:04  Phos  2.7     06-14  Mg     2.00     06-14          RADIOLOGY:     PHYSICAL EXAM:  AOx2-3 (needs some prompting for year), Following Commands, Face symmetrical  Mild swelling at superior portion of incision, decreased from previous   Dressing c/d/i    RUE MOTOR:   Delt 5/5  Bicep 5/5  Tricep 5/5      HG 5/5      LUE MOTOR:   Delt 5/5  Bicep 5/5   Tricep 5/5     HG 5/5     RLE MOTOR:   HF 5/5            KF 5/5          KE 5/5         DF 5/5         PF 5/5    EHL 5/5    LLE MOTOR:   HF 5/5        KF 5/5          KE 5/5            DF 5/5            PF 5/5       EHL 5/5      SENSATION: intact to light touch

## 2025-06-16 LAB
ANION GAP SERPL CALC-SCNC: 16 MMOL/L — HIGH (ref 7–14)
BUN SERPL-MCNC: 18 MG/DL — SIGNIFICANT CHANGE UP (ref 7–23)
CALCIUM SERPL-MCNC: 8.6 MG/DL — SIGNIFICANT CHANGE UP (ref 8.4–10.5)
CHLORIDE SERPL-SCNC: 102 MMOL/L — SIGNIFICANT CHANGE UP (ref 98–107)
CO2 SERPL-SCNC: 20 MMOL/L — LOW (ref 22–31)
CREAT SERPL-MCNC: 0.6 MG/DL — SIGNIFICANT CHANGE UP (ref 0.5–1.3)
EGFR: 92 ML/MIN/1.73M2 — SIGNIFICANT CHANGE UP
EGFR: 92 ML/MIN/1.73M2 — SIGNIFICANT CHANGE UP
GLUCOSE BLDC GLUCOMTR-MCNC: 115 MG/DL — HIGH (ref 70–99)
GLUCOSE BLDC GLUCOMTR-MCNC: 125 MG/DL — HIGH (ref 70–99)
GLUCOSE BLDC GLUCOMTR-MCNC: 128 MG/DL — HIGH (ref 70–99)
GLUCOSE BLDC GLUCOMTR-MCNC: 145 MG/DL — HIGH (ref 70–99)
GLUCOSE SERPL-MCNC: 133 MG/DL — HIGH (ref 70–99)
HCT VFR BLD CALC: 29.4 % — LOW (ref 34.5–45)
HGB BLD-MCNC: 9.5 G/DL — LOW (ref 11.5–15.5)
MAGNESIUM SERPL-MCNC: 1.7 MG/DL — SIGNIFICANT CHANGE UP (ref 1.6–2.6)
MCHC RBC-ENTMCNC: 29.2 PG — SIGNIFICANT CHANGE UP (ref 27–34)
MCHC RBC-ENTMCNC: 32.3 G/DL — SIGNIFICANT CHANGE UP (ref 32–36)
MCV RBC AUTO: 90.5 FL — SIGNIFICANT CHANGE UP (ref 80–100)
NRBC # BLD AUTO: 0 K/UL — SIGNIFICANT CHANGE UP (ref 0–0)
NRBC # FLD: 0 K/UL — SIGNIFICANT CHANGE UP (ref 0–0)
NRBC BLD AUTO-RTO: 0 /100 WBCS — SIGNIFICANT CHANGE UP (ref 0–0)
PHOSPHATE SERPL-MCNC: 3.1 MG/DL — SIGNIFICANT CHANGE UP (ref 2.5–4.5)
PLATELET # BLD AUTO: 449 K/UL — HIGH (ref 150–400)
POTASSIUM SERPL-MCNC: 3.5 MMOL/L — SIGNIFICANT CHANGE UP (ref 3.5–5.3)
POTASSIUM SERPL-SCNC: 3.5 MMOL/L — SIGNIFICANT CHANGE UP (ref 3.5–5.3)
RBC # BLD: 3.25 M/UL — LOW (ref 3.8–5.2)
RBC # FLD: 14.6 % — HIGH (ref 10.3–14.5)
SODIUM SERPL-SCNC: 138 MMOL/L — SIGNIFICANT CHANGE UP (ref 135–145)
WBC # BLD: 7.07 K/UL — SIGNIFICANT CHANGE UP (ref 3.8–10.5)
WBC # FLD AUTO: 7.07 K/UL — SIGNIFICANT CHANGE UP (ref 3.8–10.5)

## 2025-06-16 PROCEDURE — 99232 SBSQ HOSP IP/OBS MODERATE 35: CPT

## 2025-06-16 PROCEDURE — 99233 SBSQ HOSP IP/OBS HIGH 50: CPT

## 2025-06-16 RX ORDER — PRAMIPEXOLE DIHYDROCHLORIDE 1 MG/1
5 TABLET ORAL
Qty: 0 | Refills: 0 | DISCHARGE
Start: 2025-06-16

## 2025-06-16 RX ORDER — PREGABALIN 50 MG/1
1 CAPSULE ORAL
Qty: 0 | Refills: 0 | DISCHARGE
Start: 2025-06-16

## 2025-06-16 RX ADMIN — Medication 3 MILLILITER(S): at 13:27

## 2025-06-16 RX ADMIN — PREGABALIN 100 MILLIGRAM(S): 50 CAPSULE ORAL at 17:48

## 2025-06-16 RX ADMIN — PRAMIPEXOLE DIHYDROCHLORIDE 1.25 MILLIGRAM(S): 1 TABLET ORAL at 05:11

## 2025-06-16 RX ADMIN — APIXABAN 5 MILLIGRAM(S): 2.5 TABLET, FILM COATED ORAL at 17:48

## 2025-06-16 RX ADMIN — CYCLOBENZAPRINE HYDROCHLORIDE 10 MILLIGRAM(S): 15 CAPSULE, EXTENDED RELEASE ORAL at 17:48

## 2025-06-16 RX ADMIN — METOPROLOL SUCCINATE 100 MILLIGRAM(S): 50 TABLET, EXTENDED RELEASE ORAL at 17:48

## 2025-06-16 RX ADMIN — Medication 40 MILLIGRAM(S): at 12:53

## 2025-06-16 RX ADMIN — Medication 1 APPLICATION(S): at 12:54

## 2025-06-16 RX ADMIN — APIXABAN 5 MILLIGRAM(S): 2.5 TABLET, FILM COATED ORAL at 05:12

## 2025-06-16 RX ADMIN — CYCLOBENZAPRINE HYDROCHLORIDE 10 MILLIGRAM(S): 15 CAPSULE, EXTENDED RELEASE ORAL at 05:12

## 2025-06-16 RX ADMIN — Medication 3 MILLILITER(S): at 21:45

## 2025-06-16 RX ADMIN — ATORVASTATIN CALCIUM 40 MILLIGRAM(S): 80 TABLET, FILM COATED ORAL at 21:41

## 2025-06-16 RX ADMIN — OXYCODONE HYDROCHLORIDE 10 MILLIGRAM(S): 30 TABLET ORAL at 08:58

## 2025-06-16 RX ADMIN — PRAMIPEXOLE DIHYDROCHLORIDE 1.25 MILLIGRAM(S): 1 TABLET ORAL at 21:41

## 2025-06-16 RX ADMIN — OXYCODONE HYDROCHLORIDE 10 MILLIGRAM(S): 30 TABLET ORAL at 09:30

## 2025-06-16 RX ADMIN — PRAMIPEXOLE DIHYDROCHLORIDE 1.25 MILLIGRAM(S): 1 TABLET ORAL at 12:55

## 2025-06-16 RX ADMIN — Medication 2 TABLET(S): at 21:40

## 2025-06-16 RX ADMIN — Medication 3 MILLILITER(S): at 07:32

## 2025-06-16 RX ADMIN — PREGABALIN 100 MILLIGRAM(S): 50 CAPSULE ORAL at 05:12

## 2025-06-16 NOTE — PROGRESS NOTE ADULT - ASSESSMENT
76 yo F w/ obesity (BMI 33.5), w/ SILVANA, HTN, pAfib on eliquis, GERD, chronic pain, restless leg syndrome, prior spinal surgery c/b paraspinal abscess and bacteremia, presented for scheduled spinal surgery to manage vertebral fracture, s/p T3-pelvis with L4 pedical osteotomy and T3 pelvis fusion, t3-pelvis PSF w/ myocutaneous flap, course c/b anemia due to intraoperative blood loss and hypotension requiring transfusion, pressors and SICU care, CVA (felt to be cardioembolic), post-surgical edema/seroma, now pending YEIMI placement.     # Thoracic vertebral fracture (T9) w/ moderate to severe bilateral neural foraminal narrowing   # post-operative anemia due to acute blood loss  - s/p T3-pelvis with L4 pedical osteotomy and T3 pelvis fusion, t3-pelvis PSF w/ myocutaneous flap on 6/4  - post-op care, wound care, pain management, bowel regimen as per Nsx  - post-surgical edema/seroma noted on CT, monitoring as per Nsx  - PMR rec YEIMI  - hgb currently stable, continue to monitor, s/p multiple PRBCs, and transfusion of cyro, FFP and platelets earlier during hospitalization    # Acute CVA - suspect embolic etiology  # pAfib w/ secondary hypercoagulable state  # Essential HTN  - imaging noting infarcts  - resumed on DOAC (eliquis bid) 6/11  - HR controlled on B-blocker, BP also in acceptable range  - on statin  - no overt seizures noted on EEG  - c/w risk factor modifications, avoid hypotension as per neuro    # Obesity  # SILVANA  - encourage weight loss/lifestyle modifications  - was off cpap prior to surgery due to sialadenitis, f/u use of cpap (?son to bring in), morning sleepiness/falling asleep in chair this AM could be related to SILVANA. Will add VBG to am to check pCO2.     # Chronic pain  # restless leg syndrome  - c/w multimodal pain regimen, c/w bowel regimen to prevent opioid induced constipation  - c/w pramipexole

## 2025-06-16 NOTE — PROGRESS NOTE ADULT - ASSESSMENT
VIRGINIA HUFFMAN is a 75yFemale s/p PSF with closure 6/4    - Gauze/tegaderm dressing  - Nylons to remain on until followup with Dr. Bolton  - Pain control  - Advance diet as tolerated, ensure pt taking adequate protein  - Appreciate rest of care per primary team    Plastic Surgery  [facility]: [phone]  [facility]: [phone]

## 2025-06-16 NOTE — PROGRESS NOTE ADULT - SUBJECTIVE AND OBJECTIVE BOX
PAST 24HR EVENTS: Pt seen and examined at bedside, no acute events. Vitals stable, afebrile. Pending YEIMI.     Vital Signs Last 24 Hrs  T(C): 37.1 (16 Jun 2025 00:32), Max: 37.4 (15 Cj 2025 20:41)  T(F): 98.7 (16 Jun 2025 00:32), Max: 99.4 (15 Cj 2025 20:41)  HR: 98 (16 Jun 2025 00:32) (98 - 120)  BP: 128/50 (16 Jun 2025 00:32) (108/64 - 151/67)  BP(mean): --  ABP: --  ABP(mean): --  RR: 18 (16 Jun 2025 00:32) (18 - 20)  SpO2: 96% (16 Jun 2025 00:32) (96% - 100%)    O2 Parameters below as of 16 Jun 2025 00:32  Patient On (Oxygen Delivery Method): room air                MEDS:   acetaminophen     Tablet .. 650 milliGRAM(s) Oral every 6 hours PRN  apixaban 5 milliGRAM(s) Oral every 12 hours  atorvastatin 40 milliGRAM(s) Oral at bedtime  bacitracin   Ointment 1 Application(s) Topical daily  chlorhexidine 2% Cloths 1 Application(s) Topical daily  cyclobenzaprine 10 milliGRAM(s) Oral two times a day  insulin lispro (ADMELOG) corrective regimen sliding scale   SubCutaneous Before meals and at bedtime  metoprolol tartrate 100 milliGRAM(s) Oral two times a day  naloxone Injectable 0.1 milliGRAM(s) IV Push every 3 minutes PRN  ondansetron Injectable 4 milliGRAM(s) IV Push every 6 hours PRN  oxyCODONE    IR 5 milliGRAM(s) Oral every 6 hours PRN  oxyCODONE    IR 10 milliGRAM(s) Oral every 6 hours PRN  pantoprazole  Injectable 40 milliGRAM(s) IV Push daily  polyethylene glycol 3350 17 Gram(s) Oral daily  povidone iodine 10% Solution 1 Application(s) Topical daily  pramipexole 1.25 milliGRAM(s) Oral three times a day  pregabalin 100 milliGRAM(s) Oral two times a day  senna 2 Tablet(s) Oral at bedtime  sodium chloride 0.9% lock flush 3 milliLiter(s) IV Push every 8 hours      LABS:                                     9.5    7.61  )-----------( 411      ( 15 Cj 2025 07:23 )             29.8     06-15    137  |  104  |  19  ----------------------------<  116[H]  3.4[L]   |  22  |  0.68    Ca    8.8      15 Cj 2025 07:58  Phos  3.1     06-15  Mg     1.80     06-15          RADIOLOGY:     PHYSICAL EXAM:  AOx2), Following Commands, Face symmetrical  Dressing c/d/i    RUE MOTOR:   Delt 5/5  Bicep 5/5  Tricep 5/5      HG 5/5      LUE MOTOR:   Delt 5/5  Bicep 5/5   Tricep 5/5     HG 5/5     RLE MOTOR:   HF 5/5            KF 5/5          KE 5/5         DF 5/5         PF 5/5    EHL 5/5    LLE MOTOR:   HF 5/5        KF 5/5          KE 5/5            DF 5/5            PF 5/5       EHL 5/5      SENSATION: intact to light touch

## 2025-06-16 NOTE — PROGRESS NOTE ADULT - SUBJECTIVE AND OBJECTIVE BOX
Plastic Surgery Progress Note (pg [facility]: [id number], [facility]: [phone])    SUBJECTIVE  The patient was seen and examined. No acute events overnight. Pain controlled, afebrile w/ stable vitals.     OBJECTIVE  ___________________________________________________  VITAL SIGNS / I&O's   Vital Signs Last 24 Hrs  T(C): 37 (16 Jun 2025 08:00), Max: 37.4 (15 Cj 2025 20:41)  T(F): 98.6 (16 Jun 2025 08:00), Max: 99.4 (15 Cj 2025 20:41)  HR: 89 (16 Jun 2025 08:00) (89 - 120)  BP: 129/97 (16 Jun 2025 08:00) (108/64 - 151/67)  BP(mean): --  RR: 18 (16 Jun 2025 08:00) (18 - 20)  SpO2: 96% (16 Jun 2025 08:00) (95% - 99%)    Parameters below as of 16 Jun 2025 08:00  Patient On (Oxygen Delivery Method): room air          15 Cj 2025 07:01  -  16 Jun 2025 07:00  --------------------------------------------------------  IN:    Oral Fluid: 1590 mL  Total IN: 1590 mL    OUT:    IV PiggyBack: 0 mL    Voided (mL): 1950 mL  Total OUT: 1950 mL    Total NET: -360 mL      16 Jun 2025 07:01  -  16 Jun 2025 10:13  --------------------------------------------------------  IN:    Oral Fluid: 470 mL  Total IN: 470 mL    OUT:    IV PiggyBack: 0 mL  Total OUT: 0 mL    Total NET: 470 mL        ___________________________________________________  PHYSICAL EXAM  -- CONSTITUTIONAL: NAD, lying in bed  -- NEURO: Awake, alert  -- HEENT: NC/AT, mucous membranes moist  -- NECK: Soft, no asymmetry  -- PULM: Non-labored respirations, equal chest rise bilaterally  -- BACK: Soft, flat. Dressing c/d/i.   -- EXTREMITIES: Warm and well perfused  -- PSYCH: Affect normal, A&Ox3    ___________________________________________________  LABS                        9.5    7.07  )-----------( 449      ( 16 Jun 2025 09:54 )             29.4     15 Cj 2025 07:58    137    |  104    |  19     ----------------------------<  116    3.4     |  22     |  0.68     Ca    8.8        15 Cj 2025 07:58  Phos  3.1       15 Cj 2025 07:58  Mg     1.80      15 Cj 2025 07:58        CAPILLARY BLOOD GLUCOSE      POCT Blood Glucose.: 128 mg/dL (16 Jun 2025 08:07)  POCT Blood Glucose.: 140 mg/dL (15 Cj 2025 21:24)  POCT Blood Glucose.: 85 mg/dL (15 Cj 2025 17:14)  POCT Blood Glucose.: 110 mg/dL (15 Cj 2025 12:12)        Urinalysis Basic - ( 15 Cj 2025 07:58 )    Color: x / Appearance: x / SG: x / pH: x  Gluc: 116 mg/dL / Ketone: x  / Bili: x / Urobili: x   Blood: x / Protein: x / Nitrite: x   Leuk Esterase: x / RBC: x / WBC x   Sq Epi: x / Non Sq Epi: x / Bacteria: x      ___________________________________________________  MICRO  Recent Cultures:    ___________________________________________________  MEDICATIONS  (STANDING):  apixaban 5 milliGRAM(s) Oral every 12 hours  atorvastatin 40 milliGRAM(s) Oral at bedtime  bacitracin   Ointment 1 Application(s) Topical daily  chlorhexidine 2% Cloths 1 Application(s) Topical daily  cyclobenzaprine 10 milliGRAM(s) Oral two times a day  insulin lispro (ADMELOG) corrective regimen sliding scale   SubCutaneous Before meals and at bedtime  metoprolol tartrate 100 milliGRAM(s) Oral two times a day  pantoprazole  Injectable 40 milliGRAM(s) IV Push daily  polyethylene glycol 3350 17 Gram(s) Oral daily  povidone iodine 10% Solution 1 Application(s) Topical daily  pramipexole 1.25 milliGRAM(s) Oral three times a day  pregabalin 100 milliGRAM(s) Oral two times a day  senna 2 Tablet(s) Oral at bedtime  sodium chloride 0.9% lock flush 3 milliLiter(s) IV Push every 8 hours    MEDICATIONS  (PRN):  acetaminophen     Tablet .. 650 milliGRAM(s) Oral every 6 hours PRN Temp greater or equal to 38C (100.4F), Mild Pain (1 - 3)  naloxone Injectable 0.1 milliGRAM(s) IV Push every 3 minutes PRN For ANY of the following changes in patient status:  A. RR LESS THAN 10 breaths per minute, B. Oxygen saturation LESS THAN 90%, C. Sedation score of 6  ondansetron Injectable 4 milliGRAM(s) IV Push every 6 hours PRN Nausea  oxyCODONE    IR 5 milliGRAM(s) Oral every 6 hours PRN Moderate Pain (4 - 6)  oxyCODONE    IR 10 milliGRAM(s) Oral every 6 hours PRN Severe Pain (7 - 10)

## 2025-06-16 NOTE — PROGRESS NOTE ADULT - SUBJECTIVE AND OBJECTIVE BOX
[facility] Division of [facility] Medicine  Joseph Farias MD   Available via MS Teams  In house pager [phone]    Patient is a 75y old  Female who presents with a chief complaint of SDA (12 Jun 2025 04:16)    SUBJECTIVE / OVERNIGHT EVENTS:  - Pt seen and examined at bedside. Alert and oriented but drowsy during interview. Keeps dozing off but wakes up when prompted to awaken.     ADDITIONAL REVIEW OF SYSTEMS: denies current complaints     MEDICATIONS  (STANDING):  apixaban 5 milliGRAM(s) Oral every 12 hours  atorvastatin 40 milliGRAM(s) Oral at bedtime  bacitracin   Ointment 1 Application(s) Topical daily  chlorhexidine 2% Cloths 1 Application(s) Topical daily  cyclobenzaprine 10 milliGRAM(s) Oral two times a day  insulin lispro (ADMELOG) corrective regimen sliding scale   SubCutaneous Before meals and at bedtime  metoprolol tartrate 100 milliGRAM(s) Oral two times a day  pantoprazole  Injectable 40 milliGRAM(s) IV Push daily  polyethylene glycol 3350 17 Gram(s) Oral daily  povidone iodine 10% Solution 1 Application(s) Topical daily  pramipexole 1.25 milliGRAM(s) Oral three times a day  pregabalin 100 milliGRAM(s) Oral two times a day  senna 2 Tablet(s) Oral at bedtime  sodium chloride 0.9% lock flush 3 milliLiter(s) IV Push every 8 hours    MEDICATIONS  (PRN):  acetaminophen     Tablet .. 650 milliGRAM(s) Oral every 6 hours PRN Temp greater or equal to 38C (100.4F), Mild Pain (1 - 3)  naloxone Injectable 0.1 milliGRAM(s) IV Push every 3 minutes PRN For ANY of the following changes in patient status:  A. RR LESS THAN 10 breaths per minute, B. Oxygen saturation LESS THAN 90%, C. Sedation score of 6  ondansetron Injectable 4 milliGRAM(s) IV Push every 6 hours PRN Nausea  oxyCODONE    IR 5 milliGRAM(s) Oral every 6 hours PRN Moderate Pain (4 - 6)  oxyCODONE    IR 10 milliGRAM(s) Oral every 6 hours PRN Severe Pain (7 - 10)      I&O's Summary    15 Cj 2025 07:01  -  16 Jun 2025 07:00  --------------------------------------------------------  IN: 1590 mL / OUT: 1950 mL / NET: -360 mL    16 Jun 2025 07:01  -  16 Jun 2025 23:44  --------------------------------------------------------  IN: 1770 mL / OUT: 400 mL / NET: 1370 mL        PHYSICAL EXAM:  Vital Signs Last 24 Hrs  T(C): 36.9 (16 Jun 2025 20:00), Max: 37.1 (16 Jun 2025 00:32)  T(F): 98.4 (16 Jun 2025 20:00), Max: 98.7 (16 Jun 2025 00:32)  HR: 87 (16 Jun 2025 20:00) (87 - 98)  BP: 112/54 (16 Jun 2025 20:00) (109/54 - 137/54)  BP(mean): --  RR: 18 (16 Jun 2025 20:00) (18 - 18)  SpO2: 96% (16 Jun 2025 20:00) (94% - 96%)    Parameters below as of 16 Jun 2025 20:00  Patient On (Oxygen Delivery Method): room air      GENERAL: NAD, in bed, intermittently falling asleep but arousable to voice  HEAD:  NC/AT  EYES: EOMI, clear sclera/conjunctiva  ENMT: MMM, hearing intact to voice  NERVOUS SYSTEM:  No CN deficits, strength equal/intact b/l UE, decreased strength to RLE   CHEST/LUNG: Comfortable on RA, speaking in full sentences   ABDOMEN: non-tender  EXTREMITIES:  no c/c/e  SKIN: surgical site c/d/i, some ecchymosis on R forearm    LABS:                        9.5    7.07  )-----------( 449      ( 16 Jun 2025 09:54 )             29.4     06-16    138  |  102  |  18  ----------------------------<  133[H]  3.5   |  20[L]  |  0.60    Ca    8.6      16 Jun 2025 09:54  Phos  3.1     06-16  Mg     1.70     06-16            Urinalysis Basic - ( 16 Jun 2025 09:54 )    Color: x / Appearance: x / SG: x / pH: x  Gluc: 133 mg/dL / Ketone: x  / Bili: x / Urobili: x   Blood: x / Protein: x / Nitrite: x   Leuk Esterase: x / RBC: x / WBC x   Sq Epi: x / Non Sq Epi: x / Bacteria: x            RADIOLOGY & ADDITIONAL TESTS:  New Results Reviewed Today: [x]

## 2025-06-16 NOTE — PROGRESS NOTE ADULT - SUBJECTIVE AND OBJECTIVE BOX
Orthopedic Surgery      Pt seen and examined. Pt denies any overnight events. Pt reports pain is well controlled. Pt denies any fever/chills/chest pain/nausea/vomiting. Pt reports standing at side of bed.     ICU Vital Signs Last 24 Hrs  T(C): 37 (16 Jun 2025 04:35), Max: 37.4 (15 Cj 2025 20:41)  T(F): 98.6 (16 Jun 2025 04:35), Max: 99.4 (15 Cj 2025 20:41)  HR: 94 (16 Jun 2025 04:35) (94 - 120)  BP: 137/54 (16 Jun 2025 04:35) (108/64 - 151/67)  BP(mean): --  ABP: --  ABP(mean): --  RR: 18 (16 Jun 2025 04:35) (18 - 20)  SpO2: 95% (16 Jun 2025 04:35) (95% - 100%)          Physical exam:   Gen: No acute distress  Resp: Breathing comfortably on room air    Motor exam:          Upper extremity         C5 (Shoulder Abd)    C6 (Elbow flex)   C7 (Elbow ext)   C8 (Finger flex) T1 (finger abd)         R         5/5                 5/5                       5/5                      5/5                         5/5          L          5/5                 5/5                      5/5                      5/5                         5/5                   Lower extremity           L2 (Hip flex)  L3 (knee ext)  L4 (Dorsi flex)  L5 (EHL)  S1 (Plantar flex)                                               R        5/5            5/5             5/5                    5/5          5/5      L        5/5            5/5             5/5                   5/5           5/5      Sensory exam:                        C5      C6      C7      C8       T1          RIGHT          2         2        2         2         2          (0=absent, 1=impaired, 2=normal, NT=not testable)  LEFT             2         2        2         2         2          (0=absent, 1=impaired, 2=normal, NT=not testable)                          L2      L3     L4     L5       S1          RIGHT          2         2        2         2         2          (0=absent, 1=impaired, 2=normal, NT=not testable)  LEFT             2         2        2         2         2          (0=absent, 1=impaired, 2=normal, NT=not testable)     LABS:                        9.5    7.61  )-----------( 411      ( 15 Cj 2025 07:23 )             29.8     06-15    137  |  104  |  19  ----------------------------<  116[H]  3.4[L]   |  22  |  0.68    Ca    8.8      15 Cj 2025 07:58  Phos  3.1     06-15  Mg     1.80     06-15        Urinalysis Basic - ( 15 Cj 2025 07:58 )    Color: x / Appearance: x / SG: x / pH: x  Gluc: 116 mg/dL / Ketone: x  / Bili: x / Urobili: x   Blood: x / Protein: x / Nitrite: x   Leuk Esterase: x / RBC: x / WBC x   Sq Epi: x / Non Sq Epi: x / Bacteria: x              Assessment/Plan:   75yFemale s/p T4-pelvis post lami, PSF. Recovering appropriately    -pain control prn  -incentive spirometry  -DVT ppx: SCDs, lovenox   -PT/OT  -care per neurosurgery  -FU drains w prs    Sukumar Amador PGY-2    For any questions please contact the on call orthopedic surgery team, please do not reach out via teams.  [facility] [place] pager: [phone]  [facility] pager: [phone]  [facility] pager: [phone]

## 2025-06-16 NOTE — PROGRESS NOTE ADULT - PROBLEM SELECTOR PLAN 1
- Neuro checks q4  - TLSO when OOB  - Daily CBC/BMP  - Incentive spirometry  - Bowel regimen  - cont eliquis 5mg BID and metoprolol 100mg BID   - Pending YEIMI, SW aware     Pager [phone]   Case discussed with attending neurosurgeon Dr. Stroud.

## 2025-06-16 NOTE — PROGRESS NOTE ADULT - SUBJECTIVE AND OBJECTIVE BOX
Patient is a 75y old  Female who presents with a chief complaint of SDA- spine surgery (12 Jun 2025 09:25)      HPI:  76 y/o female PMH of HTN, SILVANA, PAF on Eliquis PE (in 1980s) OA, hiatal hernia (repair 2023) gastroparesis, peripheral neuropathy, SILVANA on CPAP, and extensive prior spinal surgery most recently receiving thoracic lumbar fusion with cage with Dr. Stroud on 9/24, after a fall, complicated by sepsis, and paroxysmal atrial fibrillation, development of pleural effusions, symptomatic orthostatic hypotension, urinary retention, post op anemia, chest pain with spontaneous resolution, and paraspinal abscess s/p paraspinal collection aspiration on 10/25/24 presents to presurgical testing with diagnosis of unspecified fracture thoracic vertebrae, scoliosis, and postlaminectomy syndrome. Pt twisted her back a few weeks ago resulting in vertebral fracture. Pt is scheduled for T4-Pelvis posterior laminectomy and fusion with posterior instrumentation. Possible corpectomy T9. Lumbar osteotomy and illiac fixation.       (22 May 2025 09:58)      REVIEW OF SYSTEMS  weakness    PAST MEDICAL & SURGICAL HISTORY  H/O seasonal allergies    History of pulmonary embolism    Restless leg syndrome    GERD (gastroesophageal reflux disease)    HTN (hypertension)    OA (osteoarthritis)    Fungal infection of skin    H/O scoliosis    Essential hypertension    Depression    Osteoporosis    Right hip pain    2019 novel coronavirus disease (COVID-19)    Hiatal hernia    History of chronic pain    Peripheral neuropathy    Chronic constipation    Chronic GERD    Closed fracture of thoracic vertebra    MSSA (methicillin susceptible Staphylococcus aureus) infection    SILVANA on CPAP    Paroxysmal atrial fibrillation    PFO (patent foramen ovale)    Hearing loss    Postlaminectomy syndrome    Delayed gastric emptying    Anemia    DM2 (diabetes mellitus, type 2)    S/P repair of paraesophageal hernia    S/P spinal fusion    Scoliosis    S/P spinal surgery    Removal of pin, plate, abigail, or screw    S/P hysterectomy    S/P hip replacement    S/P shoulder surgery    S/P dilatation and curettage    H/O arthroscopy of knee    H/O total knee replacement, right    S/P cataract surgery    History of bilateral hip replacements    History of repair of hiatal hernia         CURRENT FUNCTIONAL STATUS  6/15   Bed Mobility  Bed Mobility Training Rehab Potential: good, to achieve stated therapy goals  Bed Mobility Training Symptoms Noted During/After Treatment: none  Bed Mobility Training Rolling/Turning: moderate assist (50% patient effort);  verbal cues;  bed rails  Bed Mobility Training Scooting: moderate assist (50% patient effort);  1 person assist  Bed Mobility Training Sit-to-Supine: moderate assist (50% patient effort);  2 person assist  Bed Mobility Training Supine-to-Sit: moderate assist (50% patient effort);  2 person assist;  bed rails;  verbal cues  Bed Mobility Training Limitations: pain;  decreased strength;  impaired balance    Sit-Stand Transfer Training  Sit-to-Stand Transfer Training Rehab Potential: good, to achieve stated therapy goals  Sit-to-Stand Transfer Training Symptoms Noted During/After Treatment: none  Transfer Training Sit-to-Stand Transfer: minimum assist (75% patient effort);  2 person assist;  weight-bearing as tolerated   non-powered sit to stand lift (doug stedy)   Transfer Training Stand-to-Sit Transfer: minimum assist (75% patient effort);  2 person assist;  weight-bearing as tolerated   non-powered sit to stand lift (doug stedy)   Sit-to-Stand Transfer Training Transfer Safety Analysis: decreased ROM;  decreased strength;  impaired balance;  pain;  non-powered sit to stand lift (doug stedy)     Therapeutic Exercise  Therapeutic Exercise Rehab Effort: good  Therapeutic Exercise Symptoms Noted During/After Treatment: none  Therapeutic Exercise Detail: Pt. participated in semi-supine therapeutic exercises  x10 throughout bilateral lower extremities including quad set, glute set and ankle pumps.         FAMILY HISTORY   Family history of abdominal aortic aneurysm (AAA) (Mother)        RECENT LABS/IMAGING  CBC Full  -  ( 16 Jun 2025 09:54 )  WBC Count : 7.07 K/uL  RBC Count : 3.25 M/uL  Hemoglobin : 9.5 g/dL  Hematocrit : 29.4 %  Platelet Count - Automated : 449 K/uL  Mean Cell Volume : 90.5 fL  Mean Cell Hemoglobin : 29.2 pg  Mean Cell Hemoglobin Concentration : 32.3 g/dL  Auto Neutrophil # : x  Auto Lymphocyte # : x  Auto Monocyte # : x  Auto Eosinophil # : x  Auto Basophil # : x  Auto Neutrophil % : x  Auto Lymphocyte % : x  Auto Monocyte % : x  Auto Eosinophil % : x  Auto Basophil % : x    06-16    138  |  102  |  18  ----------------------------<  133[H]  3.5   |  20[L]  |  0.60    Ca    8.6      16 Jun 2025 09:54  Phos  3.1     06-16  Mg     1.70     06-16      Urinalysis Basic - ( 16 Jun 2025 09:54 )    Color: x / Appearance: x / SG: x / pH: x  Gluc: 133 mg/dL / Ketone: x  / Bili: x / Urobili: x   Blood: x / Protein: x / Nitrite: x   Leuk Esterase: x / RBC: x / WBC x   Sq Epi: x / Non Sq Epi: x / Bacteria: x        VITALS  T(C): 37.1 (06-16-25 @ 12:00), Max: 37.4 (06-15-25 @ 20:41)  HR: 88 (06-16-25 @ 12:00) (88 - 120)  BP: 110/51 (06-16-25 @ 12:00) (110/51 - 151/67)  RR: 18 (06-16-25 @ 12:00) (18 - 18)  SpO2: 94% (06-16-25 @ 12:00) (94% - 98%)  Wt(kg): --    ALLERGIES  Dilantin (Urticaria)  penicillins (Urticaria)  erythromycin (Stomach Upset; Vomiting; Nausea)      MEDICATIONS   acetaminophen     Tablet .. 650 milliGRAM(s) Oral every 6 hours PRN  apixaban 5 milliGRAM(s) Oral every 12 hours  atorvastatin 40 milliGRAM(s) Oral at bedtime  bacitracin   Ointment 1 Application(s) Topical daily  chlorhexidine 2% Cloths 1 Application(s) Topical daily  cyclobenzaprine 10 milliGRAM(s) Oral two times a day  insulin lispro (ADMELOG) corrective regimen sliding scale   SubCutaneous Before meals and at bedtime  metoprolol tartrate 100 milliGRAM(s) Oral two times a day  naloxone Injectable 0.1 milliGRAM(s) IV Push every 3 minutes PRN  ondansetron Injectable 4 milliGRAM(s) IV Push every 6 hours PRN  oxyCODONE    IR 5 milliGRAM(s) Oral every 6 hours PRN  oxyCODONE    IR 10 milliGRAM(s) Oral every 6 hours PRN  pantoprazole  Injectable 40 milliGRAM(s) IV Push daily  polyethylene glycol 3350 17 Gram(s) Oral daily  povidone iodine 10% Solution 1 Application(s) Topical daily  pramipexole 1.25 milliGRAM(s) Oral three times a day  pregabalin 100 milliGRAM(s) Oral two times a day  senna 2 Tablet(s) Oral at bedtime  sodium chloride 0.9% lock flush 3 milliLiter(s) IV Push every 8 hours      ----------------------------------------------------------------------------------------    PHYSICAL EXAM  Constitutional - NAD, Comfortable, sleepy  HEENT - ENCAT  Chest - no respiratory distress   Cardiovascular - RRR, S1S2   Abdomen -  Soft, NTND  Extremities -  No calf tenderness   Neurologic Exam -       Cognitive - Awake, Alert, AAO to self, place, date (requires prompting for the year)     Communication - fluent       Motor -                      LEFT    UE - 5/5                    RIGHT UE - 4+/5                    LEFT    LE - HF 2+/5, KE 2/5                     RIGHT LE - HF 2-/5, KE 2/5          Balance - WNL Static  Psychiatric - Mood stable, Affect WNL  ----------------------------------------------------------------------------------------  ASSESSMENT/PLAN  76 yo f s/p T4-pelvis post lami, PSF on 6/4  now also with CVA during admission  PT for bed mobility, transfers, ambulation as tolerated  out of bed to chair as tolerated   OT for adls  Pain -oxy ir prn  DVT PPX - lovenox  Dispo-    recommend subacute rehab when medically cleared    Total time spent to review relevant records and imaging results, examine patient, complete documentation, and when applicable discuss the case with the patient, family, , social workers, and medical team:  35 minutes

## 2025-06-16 NOTE — PROGRESS NOTE ADULT - ASSESSMENT
76 y/o female PMH of HTN, SILVANA, PAF on Eliquis PE (in 1980s) OA, hiatal hernia (repair 2023) gastroparesis, peripheral neuropathy, SILVANA on CPAP, and extensive prior spinal surgery (last in 09/2024-T9 partial corpectomy, had paraspinal abscess drained in 10/2024) presented as SDA for T3 to pelvis fusion and instrumention with L4 PSO on 6/4/25.     6/4 OR for T3 to pelvis fusion and instrumention with L4 PSO, with Dr. Stroud (neurosurgery), Dr. Gray (ortho), and Dr. Bolton (plastics), closed with nylons. 4uPRBCs given intraop, has tim bui, drains per plastics. Low pressures at end of case, pt required increasing phenylephrine intraop. 500 cc LR bolus given for increasing vasopressor requirements and elevated lactate.     6/5 POD 1, intubated and sedated (partially weaned for exam), MAEx4 spont under wrist restraints, incision c/d/i. On ancef 24h postop. Postop Hg 11.0, ramya /70.  6/7 POD 3,  H/H 8.2/23.4, trending cbc every 12h as long as patient is hemodynamically stable, hypokalemic this morning to 2.9 given potassium chloride tablets for repletion. On exam, Ms. French is more alert to person and place.   6/8 POD 4, H/H stable, exam improving. Deferring MRI for now as exam is improving and less concern for stroke. Daily H/H to decrease blood draws.   6/9 POD 5, Pending repeat H/H, now on floor, lower half of dressing saturated so dressing replaced.   6/10: pod 6, H&H stable, mri brain  Acute/subacute bilateral PCA distribution infarcts with associated, neurology called, drains per plastics (2 drains d'cd)  6/11 POD7, H&H stable, RT lower drain per plastics, Rapid response and code stroke called 2/2 worse AMS, CTH/CTA head/neck done, b/l PCA infarcts similar to MRI findings, no new acute findings. Neuro following. Subsequent episode of aphasia, rpt CTH stable. Restarted on eliquis 5mg BID and metoprolol 100mg BID.   6/12 POD 8, exam improved, no aphasia, pending CBC, RADHA drain dc'd. cont eliquis and metoprolol. pending EEG  6/13 POD 9  EEG neg for seizures, lump noted on the upper right back near incision, non tender to palpation will - will obtain CT thoracic spine. Changed dressing.   6/14 POD 10 pending YEIMI. CT thoracic performed revealing presence of postsurgical edema/seroma, will continue to monitor.  6/14 POD 11, exam stable, pending YEIMI.   6/16 exam, stable, tachycardia to 120's o/n ekg done stable sinus tachy, pending YEIMI

## 2025-06-17 ENCOUNTER — TRANSCRIPTION ENCOUNTER (OUTPATIENT)
Age: 75
End: 2025-06-17

## 2025-06-17 VITALS
DIASTOLIC BLOOD PRESSURE: 68 MMHG | TEMPERATURE: 99 F | OXYGEN SATURATION: 98 % | SYSTOLIC BLOOD PRESSURE: 120 MMHG | RESPIRATION RATE: 18 BRPM | HEART RATE: 86 BPM

## 2025-06-17 LAB
GLUCOSE BLDC GLUCOMTR-MCNC: 129 MG/DL — HIGH (ref 70–99)
GLUCOSE BLDC GLUCOMTR-MCNC: 248 MG/DL — HIGH (ref 70–99)

## 2025-06-17 PROCEDURE — 99232 SBSQ HOSP IP/OBS MODERATE 35: CPT

## 2025-06-17 RX ADMIN — OXYCODONE HYDROCHLORIDE 5 MILLIGRAM(S): 30 TABLET ORAL at 10:30

## 2025-06-17 RX ADMIN — OXYCODONE HYDROCHLORIDE 5 MILLIGRAM(S): 30 TABLET ORAL at 09:42

## 2025-06-17 RX ADMIN — Medication 40 MILLIGRAM(S): at 12:37

## 2025-06-17 RX ADMIN — Medication 1 APPLICATION(S): at 12:37

## 2025-06-17 RX ADMIN — Medication 1 APPLICATION(S): at 12:40

## 2025-06-17 RX ADMIN — APIXABAN 5 MILLIGRAM(S): 2.5 TABLET, FILM COATED ORAL at 06:06

## 2025-06-17 RX ADMIN — PRAMIPEXOLE DIHYDROCHLORIDE 1.25 MILLIGRAM(S): 1 TABLET ORAL at 12:37

## 2025-06-17 RX ADMIN — Medication 3 MILLILITER(S): at 13:15

## 2025-06-17 RX ADMIN — METOPROLOL SUCCINATE 100 MILLIGRAM(S): 50 TABLET, EXTENDED RELEASE ORAL at 06:06

## 2025-06-17 RX ADMIN — INSULIN LISPRO 2: 100 INJECTION, SOLUTION INTRAVENOUS; SUBCUTANEOUS at 12:40

## 2025-06-17 RX ADMIN — CYCLOBENZAPRINE HYDROCHLORIDE 10 MILLIGRAM(S): 15 CAPSULE, EXTENDED RELEASE ORAL at 06:06

## 2025-06-17 RX ADMIN — Medication 1 APPLICATION(S): at 12:36

## 2025-06-17 RX ADMIN — Medication 3 MILLILITER(S): at 06:05

## 2025-06-17 RX ADMIN — PREGABALIN 100 MILLIGRAM(S): 50 CAPSULE ORAL at 06:06

## 2025-06-17 RX ADMIN — OXYCODONE HYDROCHLORIDE 10 MILLIGRAM(S): 30 TABLET ORAL at 15:35

## 2025-06-17 RX ADMIN — PRAMIPEXOLE DIHYDROCHLORIDE 1.25 MILLIGRAM(S): 1 TABLET ORAL at 06:05

## 2025-06-17 NOTE — PROGRESS NOTE ADULT - PROBLEM SELECTOR PROBLEM 1
S/P fusion of thoracic spine
S/P fusion of thoracic spine
Unspecified fracture of unspecified thoracic vertebra, initial encounter for closed fracture
S/P fusion of thoracic spine
R/O S/P fusion of thoracic spine
S/P fusion of thoracic spine
Unspecified fracture of unspecified thoracic vertebra, initial encounter for closed fracture
Unspecified fracture of unspecified thoracic vertebra, initial encounter for closed fracture
S/P fusion of thoracic spine
S/P fusion of thoracic spine
Unspecified fracture of unspecified thoracic vertebra, initial encounter for closed fracture
S/P fusion of thoracic spine
S/P fusion of thoracic spine

## 2025-06-17 NOTE — PROGRESS NOTE ADULT - TIME BILLING
- Ordering, reviewing, and interpreting labs, testing, and imaging.  - Independently obtaining a review of systems and performing a physical exam  - Reviewing consultant documentation/recommendations in addition to discussing plan of care with consultants.  - Counselling and educating patient and family regarding interpretation of aforementioned items and plan of care.
- Ordering, reviewing, and interpreting labs, testing, and imaging.  - Independently obtaining a review of systems and performing a physical exam  - Reviewing consultant documentation/recommendations in addition to discussing plan of care with consultants.  - Counselling and educating patient and family regarding interpretation of aforementioned items and plan of care.
Reviewing labs/vitals/imaging/consultant recs/prior hospital course, interviewing/examining patient, providing encouragement, discussing plan/recommendations, coordinating care, documentation .
I have personally seen and examined the patient with ACP, I agree with assessment plan as outlined above.  I reviewed the above care plan with the stroke team  Noncontrast head CT did not show acute findings, no objection to anticoagulation for stroke prevention.  Neurological exam significantly better, follow EEG results
- Ordering, reviewing, and interpreting labs, testing, and imaging.  - Independently obtaining a review of systems and performing a physical exam  - Reviewing consultant documentation/recommendations in addition to discussing plan of care with consultants.  - Counselling and educating patient and family regarding interpretation of aforementioned items and plan of care.
- Ordering, reviewing, and interpreting labs, testing, and imaging.  - Independently obtaining a review of systems and performing a physical exam  - Reviewing consultant documentation/recommendations in addition to discussing plan of care with consultants.  - Counselling and educating patient and family regarding interpretation of aforementioned items and plan of care.

## 2025-06-17 NOTE — DISCHARGE NOTE NURSING/CASE MANAGEMENT/SOCIAL WORK - FINANCIAL ASSISTANCE
[facility] provides services at a reduced cost to those who are determined to be eligible through [facility]’s financial assistance program. Information regarding [facility]’s financial assistance program can be found by going to [email or web address] or by calling [phone].

## 2025-06-17 NOTE — PROGRESS NOTE ADULT - ASSESSMENT
76 y/o female PMH of HTN, SILVANA, PAF on Eliquis PE (in 1980s) OA, hiatal hernia (repair 2023) gastroparesis, peripheral neuropathy, SILVANA on CPAP, and extensive prior spinal surgery (last in 09/2024-T9 partial corpectomy, had paraspinal abscess drained in 10/2024) presented as SDA for T3 to pelvis fusion and instrumention with L4 PSO on 6/4/25.     6/4 OR for T3 to pelvis fusion and instrumention with L4 PSO, with Dr. Stroud (neurosurgery), Dr. Gray (ortho), and Dr. Bolton (plastics), closed with nylons. 4uPRBCs given intraop, has tim bui, drains per plastics. Low pressures at end of case, pt required increasing phenylephrine intraop. 500 cc LR bolus given for increasing vasopressor requirements and elevated lactate.     6/5 POD 1, intubated and sedated (partially weaned for exam), MAEx4 spont under wrist restraints, incision c/d/i. On ancef 24h postop. Postop Hg 11.0, ramya /70.  6/7 POD 3,  H/H 8.2/23.4, trending cbc every 12h as long as patient is hemodynamically stable, hypokalemic this morning to 2.9 given potassium chloride tablets for repletion. On exam, Ms. French is more alert to person and place.   6/8 POD 4, H/H stable, exam improving. Deferring MRI for now as exam is improving and less concern for stroke. Daily H/H to decrease blood draws.   6/9 POD 5, Pending repeat H/H, now on floor, lower half of dressing saturated so dressing replaced.   6/10: pod 6, H&H stable, mri brain  Acute/subacute bilateral PCA distribution infarcts with associated, neurology called, drains per plastics (2 drains d'cd)  6/11 POD7, H&H stable, RT lower drain per plastics, Rapid response and code stroke called 2/2 worse AMS, CTH/CTA head/neck done, b/l PCA infarcts similar to MRI findings, no new acute findings. Neuro following. Subsequent episode of aphasia, rpt CTH stable. Restarted on eliquis 5mg BID and metoprolol 100mg BID.   6/12 POD 8, exam improved, no aphasia, pending CBC, RADHA drain dc'd. cont eliquis and metoprolol. pending EEG  6/13 POD 9  EEG neg for seizures, lump noted on the upper right back near incision, non tender to palpation will - will obtain CT thoracic spine. Changed dressing.   6/14 POD 10 pending YEIMI. CT thoracic performed revealing presence of postsurgical edema/seroma, will continue to monitor.  6/14 POD 11, exam stable, pending YEIMI.   6/16 exam, stable, tachycardia to 120's o/n ekg done stable sinus tachy, pending YEIMI  6/17 stable exam, pending YEIMI

## 2025-06-17 NOTE — PROGRESS NOTE ADULT - SUBJECTIVE AND OBJECTIVE BOX
[facility] Division of Hospital Medicine  Joseph Farias MD   Available via MS Teams  In house pager [phone]    Patient is a 75y old  Female who presents with a chief complaint of SDA (12 Jun 2025 04:16)    SUBJECTIVE / OVERNIGHT EVENTS:  - Pt seen and examined at bedside. Alert and oriented. No longer drowsy this am.     ADDITIONAL REVIEW OF SYSTEMS: denies current complaints     MEDICATIONS  (STANDING):  apixaban 5 milliGRAM(s) Oral every 12 hours  atorvastatin 40 milliGRAM(s) Oral at bedtime  bacitracin   Ointment 1 Application(s) Topical daily  chlorhexidine 2% Cloths 1 Application(s) Topical daily  cyclobenzaprine 10 milliGRAM(s) Oral two times a day  insulin lispro (ADMELOG) corrective regimen sliding scale   SubCutaneous Before meals and at bedtime  metoprolol tartrate 100 milliGRAM(s) Oral two times a day  pantoprazole  Injectable 40 milliGRAM(s) IV Push daily  polyethylene glycol 3350 17 Gram(s) Oral daily  povidone iodine 10% Solution 1 Application(s) Topical daily  pramipexole 1.25 milliGRAM(s) Oral three times a day  pregabalin 100 milliGRAM(s) Oral two times a day  senna 2 Tablet(s) Oral at bedtime  sodium chloride 0.9% lock flush 3 milliLiter(s) IV Push every 8 hours    MEDICATIONS  (PRN):  acetaminophen     Tablet .. 650 milliGRAM(s) Oral every 6 hours PRN Temp greater or equal to 38C (100.4F), Mild Pain (1 - 3)  naloxone Injectable 0.1 milliGRAM(s) IV Push every 3 minutes PRN For ANY of the following changes in patient status:  A. RR LESS THAN 10 breaths per minute, B. Oxygen saturation LESS THAN 90%, C. Sedation score of 6  ondansetron Injectable 4 milliGRAM(s) IV Push every 6 hours PRN Nausea  oxyCODONE    IR 5 milliGRAM(s) Oral every 6 hours PRN Moderate Pain (4 - 6)  oxyCODONE    IR 10 milliGRAM(s) Oral every 6 hours PRN Severe Pain (7 - 10)      I&O's Summary    16 Jun 2025 07:01  -  17 Jun 2025 07:00  --------------------------------------------------------  IN: 2010 mL / OUT: 900 mL / NET: 1110 mL    17 Jun 2025 07:01  -  17 Jun 2025 16:13  --------------------------------------------------------  IN: 880 mL / OUT: 800 mL / NET: 80 mL        PHYSICAL EXAM:  Vital Signs Last 24 Hrs  T(C): 37 (17 Jun 2025 14:18), Max: 37.2 (17 Jun 2025 00:00)  T(F): 98.6 (17 Jun 2025 14:18), Max: 99 (17 Jun 2025 00:00)  HR: 86 (17 Jun 2025 14:18) (81 - 96)  BP: 120/68 (17 Jun 2025 14:18) (112/52 - 128/58)  BP(mean): --  RR: 18 (17 Jun 2025 14:18) (18 - 19)  SpO2: 98% (17 Jun 2025 14:18) (95% - 98%)    Parameters below as of 17 Jun 2025 14:18  Patient On (Oxygen Delivery Method): room air      GENERAL: NAD, in bed, more awake today and interactive   HEAD:  NC/AT  EYES: EOMI, clear sclera/conjunctiva  ENMT: MMM, hearing intact to voice  NERVOUS SYSTEM:  No CN deficits, strength equal/intact b/l UE, decreased strength to RLE   CHEST/LUNG: Comfortable on RA, speaking in full sentences   ABDOMEN: non-tender  EXTREMITIES:  no c/c/e  SKIN: surgical site c/d/i    LABS:                        9.5    7.07  )-----------( 449      ( 16 Jun 2025 09:54 )             29.4     06-16    138  |  102  |  18  ----------------------------<  133[H]  3.5   |  20[L]  |  0.60    Ca    8.6      16 Jun 2025 09:54  Phos  3.1     06-16  Mg     1.70     06-16            Urinalysis Basic - ( 16 Jun 2025 09:54 )    Color: x / Appearance: x / SG: x / pH: x  Gluc: 133 mg/dL / Ketone: x  / Bili: x / Urobili: x   Blood: x / Protein: x / Nitrite: x   Leuk Esterase: x / RBC: x / WBC x   Sq Epi: x / Non Sq Epi: x / Bacteria: x            RADIOLOGY & ADDITIONAL TESTS:  New Results Reviewed Today: [x]    COMMUNICATION:  Care Discussed with Consultants/Other Providers and Details of Discussion: [x]

## 2025-06-17 NOTE — PROGRESS NOTE ADULT - ASSESSMENT
76 yo F w/ obesity (BMI 33.5), w/ SILVANA, HTN, pAfib on eliquis, GERD, chronic pain, restless leg syndrome, prior spinal surgery c/b paraspinal abscess and bacteremia, presented for scheduled spinal surgery to manage vertebral fracture, s/p T3-pelvis with L4 pedical osteotomy and T3 pelvis fusion, t3-pelvis PSF w/ myocutaneous flap, course c/b anemia due to intraoperative blood loss and hypotension requiring transfusion, pressors and SICU care, CVA (felt to be cardioembolic), post-surgical edema/seroma, now pending YEIMI placement.     # Thoracic vertebral fracture (T9) w/ moderate to severe bilateral neural foraminal narrowing   # post-operative anemia due to acute blood loss  - s/p T3-pelvis with L4 pedical osteotomy and T3 pelvis fusion, t3-pelvis PSF w/ myocutaneous flap on 6/4  - post-op care, wound care, pain management, bowel regimen as per Nsx  - post-surgical edema/seroma noted on CT, monitoring as per Nsx  - PMR rec YEIMI  - hgb currently stable, continue to monitor, s/p multiple PRBCs, and transfusion of cyro, FFP and platelets earlier during hospitalization    # Acute CVA - suspect embolic etiology  # pAfib w/ secondary hypercoagulable state  # Essential HTN  - imaging noting infarcts  - resumed on DOAC (eliquis bid) 6/11  - HR controlled on B-blocker, BP also in acceptable range  - on statin  - no overt seizures noted on EEG  - c/w risk factor modifications, avoid hypotension as per neuro    # Obesity  # SILVANA  - encourage weight loss/lifestyle modifications  - was off cpap prior to surgery due to sialadenitis, pt hasn't resumed it yet. Discussed w/ patient and son important to resume it, plan for YEIMI today. Son will ensure it gets resumed. Sleepiness yesterday may have been related to SILVANA.     # Chronic pain  # restless leg syndrome  - c/w multimodal pain regimen, c/w bowel regimen to prevent opioid induced constipation  - c/w pramipexole

## 2025-06-17 NOTE — PROGRESS NOTE ADULT - PROVIDER SPECIALTY LIST ADULT
Hospitalist
Neurology
Neurosurgery
Orthopedics
Plastic Surgery
Rehab Medicine
Rehab Medicine
SICU
Hospitalist
NSICU
Neurology
Neurosurgery
Neurosurgery
Orthopedics
Plastic Surgery
Rehab Medicine
NSICU
Neurosurgery
Neurosurgery
Plastic Surgery
SICU
Neurosurgery
NSICU
Neurosurgery
Hospitalist
Hospitalist

## 2025-06-17 NOTE — DISCHARGE NOTE NURSING/CASE MANAGEMENT/SOCIAL WORK - NSDCFUADDAPPT_GEN_ALL_CORE_FT
APPTS ARE READY TO BE MADE: [x] YES    Best Family or Patient Contact (if needed):    Additional Information about above appointments (if needed):    1: neurosurgery  2: plastic surgery  3:     Other comments or requests:     Patient still admitted, was outreached but did not answer. A voicemail was left for the patient to return our call.

## 2025-06-17 NOTE — PROGRESS NOTE ADULT - PROBLEM SELECTOR PLAN 1
- Neuro checks q4  - TLSO when OOB  - Daily CBC/BMP  - Incentive spirometry  - Bowel regimen  - cont eliquis 5mg BID and metoprolol 100mg BID   - Pending YEIMI, SW aware     Pager [phone]   Case discussed with attending neurosurgeon Dr. Stroud.

## 2025-06-17 NOTE — PROGRESS NOTE ADULT - SUBJECTIVE AND OBJECTIVE BOX
PAST 24HR EVENTS: Pt seen and examined at bedside, no acute events. Vitals stable, afebrile. Pending YEIMI.     Vital Signs Last 24 Hrs  T(C): 36.7 (17 Jun 2025 04:50), Max: 37.2 (17 Jun 2025 00:00)  T(F): 98.1 (17 Jun 2025 04:50), Max: 99 (17 Jun 2025 00:00)  HR: 96 (17 Jun 2025 04:50) (81 - 96)  BP: 128/58 (17 Jun 2025 04:50) (109/54 - 129/97)  BP(mean): --  ABP: --  ABP(mean): --  RR: 18 (17 Jun 2025 04:50) (18 - 18)  SpO2: 95% (17 Jun 2025 04:50) (94% - 96%)    O2 Parameters below as of 17 Jun 2025 04:50  Patient On (Oxygen Delivery Method): room air          MEDS:   acetaminophen     Tablet .. 650 milliGRAM(s) Oral every 6 hours PRN  apixaban 5 milliGRAM(s) Oral every 12 hours  atorvastatin 40 milliGRAM(s) Oral at bedtime  bacitracin   Ointment 1 Application(s) Topical daily  chlorhexidine 2% Cloths 1 Application(s) Topical daily  cyclobenzaprine 10 milliGRAM(s) Oral two times a day  insulin lispro (ADMELOG) corrective regimen sliding scale   SubCutaneous Before meals and at bedtime  metoprolol tartrate 100 milliGRAM(s) Oral two times a day  naloxone Injectable 0.1 milliGRAM(s) IV Push every 3 minutes PRN  ondansetron Injectable 4 milliGRAM(s) IV Push every 6 hours PRN  oxyCODONE    IR 5 milliGRAM(s) Oral every 6 hours PRN  oxyCODONE    IR 10 milliGRAM(s) Oral every 6 hours PRN  pantoprazole  Injectable 40 milliGRAM(s) IV Push daily  polyethylene glycol 3350 17 Gram(s) Oral daily  povidone iodine 10% Solution 1 Application(s) Topical daily  pramipexole 1.25 milliGRAM(s) Oral three times a day  pregabalin 100 milliGRAM(s) Oral two times a day  senna 2 Tablet(s) Oral at bedtime  sodium chloride 0.9% lock flush 3 milliLiter(s) IV Push every 8 hours      LABS:                                                     9.5    7.07  )-----------( 449      ( 16 Jun 2025 09:54 )             29.4     06-16    138  |  102  |  18  ----------------------------<  133[H]  3.5   |  20[L]  |  0.60    Ca    8.6      16 Jun 2025 09:54  Phos  3.1     06-16  Mg     1.70     06-16              RADIOLOGY:     PHYSICAL EXAM:  AOx2), Following Commands, Face symmetrical  Dressing c/d/i    RUE MOTOR:   Delt 5/5  Bicep 5/5  Tricep 5/5      HG 5/5      LUE MOTOR:   Delt 5/5  Bicep 5/5   Tricep 5/5     HG 5/5     RLE MOTOR:   HF 5/5            KF 5/5          KE 5/5         DF 5/5         PF 5/5    EHL 5/5    LLE MOTOR:   HF 5/5        KF 5/5          KE 5/5            DF 5/5            PF 5/5       EHL 5/5      SENSATION: intact to light touch

## 2025-06-20 ENCOUNTER — NON-APPOINTMENT (OUTPATIENT)
Age: 75
End: 2025-06-20

## 2025-06-25 ENCOUNTER — APPOINTMENT (OUTPATIENT)
Dept: ORTHOPEDIC SURGERY | Facility: CLINIC | Age: 75
End: 2025-06-25

## 2025-06-27 PROBLEM — Z98.1 S/P FUSION OF THORACIC SPINE: Status: RESOLVED | Noted: 2024-12-05 | Resolved: 2025-06-27

## 2025-06-27 PROBLEM — Z98.1 S/P FUSION OF THORACIC SPINE: Status: ACTIVE | Noted: 2025-06-27

## 2025-06-30 ENCOUNTER — APPOINTMENT (OUTPATIENT)
Dept: GASTROENTEROLOGY | Facility: CLINIC | Age: 75
End: 2025-06-30

## 2025-07-01 ENCOUNTER — APPOINTMENT (OUTPATIENT)
Dept: ORTHOPEDIC SURGERY | Facility: CLINIC | Age: 75
End: 2025-07-01

## 2025-07-03 ENCOUNTER — APPOINTMENT (OUTPATIENT)
Dept: RADIOLOGY | Facility: CLINIC | Age: 75
End: 2025-07-03
Payer: MEDICARE

## 2025-07-03 ENCOUNTER — APPOINTMENT (OUTPATIENT)
Dept: NEUROSURGERY | Facility: CLINIC | Age: 75
End: 2025-07-03
Payer: MEDICARE

## 2025-07-03 VITALS
SYSTOLIC BLOOD PRESSURE: 101 MMHG | DIASTOLIC BLOOD PRESSURE: 62 MMHG | HEIGHT: 64 IN | WEIGHT: 187 LBS | BODY MASS INDEX: 31.92 KG/M2 | OXYGEN SATURATION: 93 % | HEART RATE: 66 BPM | TEMPERATURE: 98 F

## 2025-07-03 PROCEDURE — 72070 X-RAY EXAM THORAC SPINE 2VWS: CPT

## 2025-07-03 PROCEDURE — 72100 X-RAY EXAM L-S SPINE 2/3 VWS: CPT

## 2025-07-03 PROCEDURE — 99024 POSTOP FOLLOW-UP VISIT: CPT

## 2025-07-09 ENCOUNTER — APPOINTMENT (OUTPATIENT)
Dept: PLASTIC SURGERY | Facility: CLINIC | Age: 75
End: 2025-07-09
Payer: MEDICARE

## 2025-07-09 VITALS
WEIGHT: 187 LBS | OXYGEN SATURATION: 97 % | DIASTOLIC BLOOD PRESSURE: 79 MMHG | HEART RATE: 85 BPM | SYSTOLIC BLOOD PRESSURE: 146 MMHG | BODY MASS INDEX: 31.92 KG/M2 | TEMPERATURE: 97.9 F | HEIGHT: 64 IN

## 2025-07-09 PROCEDURE — 99024 POSTOP FOLLOW-UP VISIT: CPT

## 2025-07-11 ENCOUNTER — APPOINTMENT (OUTPATIENT)
Dept: NEUROSURGERY | Facility: CLINIC | Age: 75
End: 2025-07-11

## 2025-07-18 NOTE — ED PROVIDER NOTE - PR
[FreeTextEntry1] : For her urge incontinence symptoms, will try estrace cream at the urethra and a little inside the vagina. She will use the cream every night for 2 weeks, then twice a week. She knows that it can take up 3 months before she notices an improvement. If this does not help her symptom in 3 months, will consult with urogynecology. Referral given.   Declines anti-cholinergic medications as she already has issues with constipation.   Up to date with colon cancer screening.   Pap not collected due to age and total hysterectomy.   Prescription renewal for Nystatin Powder given.   Prescription for mammogram screening and breast US given. Self-breast exam reviewed.  She will follow up annually and as needed. 184

## 2025-07-28 ENCOUNTER — INPATIENT (INPATIENT)
Facility: HOSPITAL | Age: 75
LOS: 2 days | Discharge: NURSING, REHAB OR LONG-TERM CARE | DRG: 556 | End: 2025-07-31
Attending: NEUROLOGICAL SURGERY | Admitting: NEUROLOGICAL SURGERY
Payer: MEDICARE

## 2025-07-28 VITALS
HEART RATE: 70 BPM | OXYGEN SATURATION: 98 % | TEMPERATURE: 98 F | WEIGHT: 196.43 LBS | RESPIRATION RATE: 18 BRPM | HEIGHT: 64 IN | SYSTOLIC BLOOD PRESSURE: 122 MMHG | DIASTOLIC BLOOD PRESSURE: 70 MMHG

## 2025-07-28 DIAGNOSIS — Z98.49 CATARACT EXTRACTION STATUS, UNSPECIFIED EYE: Chronic | ICD-10-CM

## 2025-07-28 DIAGNOSIS — M79.606 PAIN IN LEG, UNSPECIFIED: ICD-10-CM

## 2025-07-28 DIAGNOSIS — Z98.890 OTHER SPECIFIED POSTPROCEDURAL STATES: Chronic | ICD-10-CM

## 2025-07-28 DIAGNOSIS — Z96.643 PRESENCE OF ARTIFICIAL HIP JOINT, BILATERAL: Chronic | ICD-10-CM

## 2025-07-28 LAB
ALBUMIN SERPL ELPH-MCNC: 4.1 G/DL — SIGNIFICANT CHANGE UP (ref 3.3–5)
ALP SERPL-CCNC: 271 U/L — HIGH (ref 40–120)
ALT FLD-CCNC: 21 U/L — SIGNIFICANT CHANGE UP (ref 10–45)
ANION GAP SERPL CALC-SCNC: 12 MMOL/L — SIGNIFICANT CHANGE UP (ref 5–17)
AST SERPL-CCNC: 18 U/L — SIGNIFICANT CHANGE UP (ref 10–40)
BASOPHILS # BLD AUTO: 0.04 K/UL — SIGNIFICANT CHANGE UP (ref 0–0.2)
BASOPHILS NFR BLD AUTO: 0.6 % — SIGNIFICANT CHANGE UP (ref 0–2)
BILIRUB SERPL-MCNC: 0.4 MG/DL — SIGNIFICANT CHANGE UP (ref 0.2–1.2)
BUN SERPL-MCNC: 24 MG/DL — HIGH (ref 7–23)
CALCIUM SERPL-MCNC: 9.7 MG/DL — SIGNIFICANT CHANGE UP (ref 8.4–10.5)
CHLORIDE SERPL-SCNC: 104 MMOL/L — SIGNIFICANT CHANGE UP (ref 96–108)
CO2 SERPL-SCNC: 26 MMOL/L — SIGNIFICANT CHANGE UP (ref 22–31)
CREAT SERPL-MCNC: 0.67 MG/DL — SIGNIFICANT CHANGE UP (ref 0.5–1.3)
EGFR: 90 ML/MIN/1.73M2 — SIGNIFICANT CHANGE UP
EGFR: 90 ML/MIN/1.73M2 — SIGNIFICANT CHANGE UP
EOSINOPHIL # BLD AUTO: 0.07 K/UL — SIGNIFICANT CHANGE UP (ref 0–0.5)
EOSINOPHIL NFR BLD AUTO: 1.1 % — SIGNIFICANT CHANGE UP (ref 0–6)
GLUCOSE SERPL-MCNC: 105 MG/DL — HIGH (ref 70–99)
HCT VFR BLD CALC: 36.5 % — SIGNIFICANT CHANGE UP (ref 34.5–45)
HGB BLD-MCNC: 11.1 G/DL — LOW (ref 11.5–15.5)
IMM GRANULOCYTES # BLD AUTO: 0.03 K/UL — SIGNIFICANT CHANGE UP (ref 0–0.07)
IMM GRANULOCYTES NFR BLD AUTO: 0.5 % — SIGNIFICANT CHANGE UP (ref 0–0.9)
LYMPHOCYTES # BLD AUTO: 1.46 K/UL — SIGNIFICANT CHANGE UP (ref 1–3.3)
LYMPHOCYTES NFR BLD AUTO: 22.5 % — SIGNIFICANT CHANGE UP (ref 13–44)
MAGNESIUM SERPL-MCNC: 1.9 MG/DL — SIGNIFICANT CHANGE UP (ref 1.6–2.6)
MCHC RBC-ENTMCNC: 26.7 PG — LOW (ref 27–34)
MCHC RBC-ENTMCNC: 30.4 G/DL — LOW (ref 32–36)
MCV RBC AUTO: 88 FL — SIGNIFICANT CHANGE UP (ref 80–100)
MONOCYTES # BLD AUTO: 0.36 K/UL — SIGNIFICANT CHANGE UP (ref 0–0.9)
MONOCYTES NFR BLD AUTO: 5.6 % — SIGNIFICANT CHANGE UP (ref 2–14)
NEUTROPHILS # BLD AUTO: 4.52 K/UL — SIGNIFICANT CHANGE UP (ref 1.8–7.4)
NEUTROPHILS NFR BLD AUTO: 69.7 % — SIGNIFICANT CHANGE UP (ref 43–77)
NRBC # BLD AUTO: 0 K/UL — SIGNIFICANT CHANGE UP (ref 0–0)
NRBC # FLD: 0 K/UL — SIGNIFICANT CHANGE UP (ref 0–0)
NRBC BLD AUTO-RTO: 0 /100 WBCS — SIGNIFICANT CHANGE UP (ref 0–0)
PLATELET # BLD AUTO: 391 K/UL — SIGNIFICANT CHANGE UP (ref 150–400)
PMV BLD: 10.1 FL — SIGNIFICANT CHANGE UP (ref 7–13)
POTASSIUM SERPL-MCNC: 3.3 MMOL/L — LOW (ref 3.5–5.3)
POTASSIUM SERPL-SCNC: 3.3 MMOL/L — LOW (ref 3.5–5.3)
PROT SERPL-MCNC: 6.9 G/DL — SIGNIFICANT CHANGE UP (ref 6–8.3)
RBC # BLD: 4.15 M/UL — SIGNIFICANT CHANGE UP (ref 3.8–5.2)
RBC # FLD: 16.1 % — HIGH (ref 10.3–14.5)
SODIUM SERPL-SCNC: 142 MMOL/L — SIGNIFICANT CHANGE UP (ref 135–145)
WBC # BLD: 6.48 K/UL — SIGNIFICANT CHANGE UP (ref 3.8–10.5)
WBC # FLD AUTO: 6.48 K/UL — SIGNIFICANT CHANGE UP (ref 3.8–10.5)

## 2025-07-28 PROCEDURE — 83735 ASSAY OF MAGNESIUM: CPT

## 2025-07-28 PROCEDURE — 85025 COMPLETE CBC W/AUTO DIFF WBC: CPT

## 2025-07-28 PROCEDURE — 99285 EMERGENCY DEPT VISIT HI MDM: CPT | Mod: FS

## 2025-07-28 PROCEDURE — 80053 COMPREHEN METABOLIC PANEL: CPT

## 2025-07-28 PROCEDURE — 36415 COLL VENOUS BLD VENIPUNCTURE: CPT

## 2025-07-28 PROCEDURE — 93010 ELECTROCARDIOGRAM REPORT: CPT

## 2025-07-28 RX ORDER — METHOCARBAMOL 500 MG/1
500 TABLET, FILM COATED ORAL EVERY 8 HOURS
Refills: 0 | Status: DISCONTINUED | OUTPATIENT
Start: 2025-07-28 | End: 2025-07-31

## 2025-07-28 RX ORDER — GABAPENTIN 400 MG/1
300 CAPSULE ORAL THREE TIMES A DAY
Refills: 0 | Status: DISCONTINUED | OUTPATIENT
Start: 2025-07-28 | End: 2025-07-31

## 2025-07-28 RX ORDER — PRAMIPEXOLE DIHYDROCHLORIDE 1 MG/1
0.25 TABLET ORAL
Refills: 0 | Status: DISCONTINUED | OUTPATIENT
Start: 2025-07-28 | End: 2025-07-30

## 2025-07-28 RX ORDER — ACETAMINOPHEN 500 MG/5ML
1000 LIQUID (ML) ORAL EVERY 6 HOURS
Refills: 0 | Status: DISCONTINUED | OUTPATIENT
Start: 2025-07-28 | End: 2025-07-31

## 2025-07-28 RX ORDER — HYDROMORPHONE/SOD CHLOR,ISO/PF 2 MG/10 ML
0.5 SYRINGE (ML) INJECTION EVERY 6 HOURS
Refills: 0 | Status: DISCONTINUED | OUTPATIENT
Start: 2025-07-28 | End: 2025-07-31

## 2025-07-28 RX ORDER — SENNA 187 MG
2 TABLET ORAL AT BEDTIME
Refills: 0 | Status: DISCONTINUED | OUTPATIENT
Start: 2025-07-28 | End: 2025-07-31

## 2025-07-28 RX ORDER — POLYETHYLENE GLYCOL 3350 17 G/17G
17 POWDER, FOR SOLUTION ORAL DAILY
Refills: 0 | Status: DISCONTINUED | OUTPATIENT
Start: 2025-07-28 | End: 2025-07-31

## 2025-07-28 RX ORDER — METOPROLOL SUCCINATE 50 MG/1
100 TABLET, EXTENDED RELEASE ORAL
Refills: 0 | Status: DISCONTINUED | OUTPATIENT
Start: 2025-07-28 | End: 2025-07-31

## 2025-07-28 RX ORDER — ONDANSETRON HCL/PF 4 MG/2 ML
4 VIAL (ML) INJECTION EVERY 6 HOURS
Refills: 0 | Status: DISCONTINUED | OUTPATIENT
Start: 2025-07-28 | End: 2025-07-31

## 2025-07-28 RX ORDER — ATORVASTATIN CALCIUM 80 MG/1
40 TABLET, FILM COATED ORAL AT BEDTIME
Refills: 0 | Status: DISCONTINUED | OUTPATIENT
Start: 2025-07-28 | End: 2025-07-31

## 2025-07-28 RX ADMIN — Medication 400 MILLIGRAM(S): at 18:51

## 2025-07-28 RX ADMIN — PRAMIPEXOLE DIHYDROCHLORIDE 0.25 MILLIGRAM(S): 1 TABLET ORAL at 20:56

## 2025-07-28 RX ADMIN — METHOCARBAMOL 500 MILLIGRAM(S): 500 TABLET, FILM COATED ORAL at 09:35

## 2025-07-28 RX ADMIN — Medication 2 TABLET(S): at 09:35

## 2025-07-28 RX ADMIN — ATORVASTATIN CALCIUM 40 MILLIGRAM(S): 80 TABLET, FILM COATED ORAL at 09:36

## 2025-07-28 RX ADMIN — GABAPENTIN 300 MILLIGRAM(S): 400 CAPSULE ORAL at 09:37

## 2025-07-29 LAB
CRP SERPL-MCNC: 5 MG/L — HIGH (ref 0–4)
ERYTHROCYTE [SEDIMENTATION RATE] IN BLOOD: 73 MM/HR — HIGH (ref 0–20)

## 2025-07-29 PROCEDURE — 83735 ASSAY OF MAGNESIUM: CPT

## 2025-07-29 PROCEDURE — 72157 MRI CHEST SPINE W/O & W/DYE: CPT | Mod: 26

## 2025-07-29 PROCEDURE — 72157 MRI CHEST SPINE W/O & W/DYE: CPT

## 2025-07-29 PROCEDURE — 72170 X-RAY EXAM OF PELVIS: CPT | Mod: 26,XE

## 2025-07-29 PROCEDURE — 87040 BLOOD CULTURE FOR BACTERIA: CPT

## 2025-07-29 PROCEDURE — 86140 C-REACTIVE PROTEIN: CPT

## 2025-07-29 PROCEDURE — 73502 X-RAY EXAM HIP UNI 2-3 VIEWS: CPT

## 2025-07-29 PROCEDURE — 72170 X-RAY EXAM OF PELVIS: CPT

## 2025-07-29 PROCEDURE — 72131 CT LUMBAR SPINE W/O DYE: CPT

## 2025-07-29 PROCEDURE — 36415 COLL VENOUS BLD VENIPUNCTURE: CPT

## 2025-07-29 PROCEDURE — 93970 EXTREMITY STUDY: CPT

## 2025-07-29 PROCEDURE — 72128 CT CHEST SPINE W/O DYE: CPT | Mod: 26

## 2025-07-29 PROCEDURE — 72131 CT LUMBAR SPINE W/O DYE: CPT | Mod: 26

## 2025-07-29 PROCEDURE — 72128 CT CHEST SPINE W/O DYE: CPT

## 2025-07-29 PROCEDURE — 72158 MRI LUMBAR SPINE W/O & W/DYE: CPT

## 2025-07-29 PROCEDURE — 72158 MRI LUMBAR SPINE W/O & W/DYE: CPT | Mod: 26

## 2025-07-29 PROCEDURE — 85025 COMPLETE CBC W/AUTO DIFF WBC: CPT

## 2025-07-29 PROCEDURE — 85652 RBC SED RATE AUTOMATED: CPT

## 2025-07-29 PROCEDURE — 73552 X-RAY EXAM OF FEMUR 2/>: CPT | Mod: 26,RT

## 2025-07-29 PROCEDURE — 72192 CT PELVIS W/O DYE: CPT | Mod: 26

## 2025-07-29 PROCEDURE — 76377 3D RENDER W/INTRP POSTPROCES: CPT

## 2025-07-29 PROCEDURE — 73502 X-RAY EXAM HIP UNI 2-3 VIEWS: CPT | Mod: 26,RT

## 2025-07-29 PROCEDURE — A9585: CPT

## 2025-07-29 PROCEDURE — 72192 CT PELVIS W/O DYE: CPT

## 2025-07-29 PROCEDURE — 73562 X-RAY EXAM OF KNEE 3: CPT

## 2025-07-29 PROCEDURE — 73562 X-RAY EXAM OF KNEE 3: CPT | Mod: 26,RT

## 2025-07-29 PROCEDURE — 99232 SBSQ HOSP IP/OBS MODERATE 35: CPT | Mod: FS

## 2025-07-29 PROCEDURE — 73552 X-RAY EXAM OF FEMUR 2/>: CPT

## 2025-07-29 PROCEDURE — 80053 COMPREHEN METABOLIC PANEL: CPT

## 2025-07-29 PROCEDURE — 76377 3D RENDER W/INTRP POSTPROCES: CPT | Mod: 26

## 2025-07-29 PROCEDURE — 93970 EXTREMITY STUDY: CPT | Mod: 26

## 2025-07-29 RX ORDER — OXYCODONE HYDROCHLORIDE 30 MG/1
5 TABLET ORAL EVERY 4 HOURS
Refills: 0 | Status: DISCONTINUED | OUTPATIENT
Start: 2025-07-29 | End: 2025-07-31

## 2025-07-29 RX ORDER — CYCLOBENZAPRINE HYDROCHLORIDE 15 MG/1
10 CAPSULE, EXTENDED RELEASE ORAL ONCE
Refills: 0 | Status: COMPLETED | OUTPATIENT
Start: 2025-07-29 | End: 2025-07-29

## 2025-07-29 RX ORDER — OXYCODONE HYDROCHLORIDE 30 MG/1
10 TABLET ORAL EVERY 4 HOURS
Refills: 0 | Status: DISCONTINUED | OUTPATIENT
Start: 2025-07-29 | End: 2025-07-31

## 2025-07-29 RX ORDER — CYCLOBENZAPRINE HYDROCHLORIDE 15 MG/1
5 CAPSULE, EXTENDED RELEASE ORAL THREE TIMES A DAY
Refills: 0 | Status: DISCONTINUED | OUTPATIENT
Start: 2025-07-29 | End: 2025-07-31

## 2025-07-29 RX ADMIN — METOPROLOL SUCCINATE 100 MILLIGRAM(S): 50 TABLET, EXTENDED RELEASE ORAL at 17:49

## 2025-07-29 RX ADMIN — Medication 2 TABLET(S): at 21:47

## 2025-07-29 RX ADMIN — CYCLOBENZAPRINE HYDROCHLORIDE 5 MILLIGRAM(S): 15 CAPSULE, EXTENDED RELEASE ORAL at 21:47

## 2025-07-29 RX ADMIN — Medication 400 MILLIGRAM(S): at 01:01

## 2025-07-29 RX ADMIN — GABAPENTIN 300 MILLIGRAM(S): 400 CAPSULE ORAL at 21:46

## 2025-07-29 RX ADMIN — POLYETHYLENE GLYCOL 3350 17 GRAM(S): 17 POWDER, FOR SOLUTION ORAL at 11:59

## 2025-07-29 RX ADMIN — Medication 40 MILLIGRAM(S): at 11:59

## 2025-07-29 RX ADMIN — PRAMIPEXOLE DIHYDROCHLORIDE 0.25 MILLIGRAM(S): 1 TABLET ORAL at 11:59

## 2025-07-29 RX ADMIN — ATORVASTATIN CALCIUM 40 MILLIGRAM(S): 80 TABLET, FILM COATED ORAL at 21:47

## 2025-07-29 RX ADMIN — PRAMIPEXOLE DIHYDROCHLORIDE 0.25 MILLIGRAM(S): 1 TABLET ORAL at 05:45

## 2025-07-29 RX ADMIN — PRAMIPEXOLE DIHYDROCHLORIDE 0.25 MILLIGRAM(S): 1 TABLET ORAL at 00:45

## 2025-07-29 RX ADMIN — METHOCARBAMOL 500 MILLIGRAM(S): 500 TABLET, FILM COATED ORAL at 14:14

## 2025-07-29 RX ADMIN — METHOCARBAMOL 500 MILLIGRAM(S): 500 TABLET, FILM COATED ORAL at 05:45

## 2025-07-29 RX ADMIN — GABAPENTIN 300 MILLIGRAM(S): 400 CAPSULE ORAL at 14:14

## 2025-07-29 RX ADMIN — PRAMIPEXOLE DIHYDROCHLORIDE 0.25 MILLIGRAM(S): 1 TABLET ORAL at 14:14

## 2025-07-29 RX ADMIN — CYCLOBENZAPRINE HYDROCHLORIDE 10 MILLIGRAM(S): 15 CAPSULE, EXTENDED RELEASE ORAL at 03:32

## 2025-07-29 RX ADMIN — GABAPENTIN 300 MILLIGRAM(S): 400 CAPSULE ORAL at 05:45

## 2025-07-29 RX ADMIN — OXYCODONE HYDROCHLORIDE 10 MILLIGRAM(S): 30 TABLET ORAL at 12:04

## 2025-07-29 RX ADMIN — Medication 1000 MILLIGRAM(S): at 02:00

## 2025-07-29 RX ADMIN — METOPROLOL SUCCINATE 100 MILLIGRAM(S): 50 TABLET, EXTENDED RELEASE ORAL at 05:54

## 2025-07-29 RX ADMIN — Medication 40 MILLIEQUIVALENT(S): at 14:16

## 2025-07-29 RX ADMIN — OXYCODONE HYDROCHLORIDE 10 MILLIGRAM(S): 30 TABLET ORAL at 12:36

## 2025-07-29 RX ADMIN — Medication 40 MILLIEQUIVALENT(S): at 17:45

## 2025-07-30 LAB
ALBUMIN SERPL ELPH-MCNC: 3.8 G/DL — SIGNIFICANT CHANGE UP (ref 3.3–5)
ALP SERPL-CCNC: 233 U/L — HIGH (ref 40–120)
ALT FLD-CCNC: 19 U/L — SIGNIFICANT CHANGE UP (ref 10–45)
ANION GAP SERPL CALC-SCNC: 14 MMOL/L — SIGNIFICANT CHANGE UP (ref 5–17)
AST SERPL-CCNC: 13 U/L — SIGNIFICANT CHANGE UP (ref 10–40)
BILIRUB SERPL-MCNC: 0.3 MG/DL — SIGNIFICANT CHANGE UP (ref 0.2–1.2)
BUN SERPL-MCNC: 26 MG/DL — HIGH (ref 7–23)
CALCIUM SERPL-MCNC: 9.4 MG/DL — SIGNIFICANT CHANGE UP (ref 8.4–10.5)
CHLORIDE SERPL-SCNC: 104 MMOL/L — SIGNIFICANT CHANGE UP (ref 96–108)
CO2 SERPL-SCNC: 25 MMOL/L — SIGNIFICANT CHANGE UP (ref 22–31)
CREAT SERPL-MCNC: 0.61 MG/DL — SIGNIFICANT CHANGE UP (ref 0.5–1.3)
EGFR: 92 ML/MIN/1.73M2 — SIGNIFICANT CHANGE UP
EGFR: 92 ML/MIN/1.73M2 — SIGNIFICANT CHANGE UP
GLUCOSE SERPL-MCNC: 101 MG/DL — HIGH (ref 70–99)
HCT VFR BLD CALC: 35 % — SIGNIFICANT CHANGE UP (ref 34.5–45)
HGB BLD-MCNC: 10.6 G/DL — LOW (ref 11.5–15.5)
MCHC RBC-ENTMCNC: 26.5 PG — LOW (ref 27–34)
MCHC RBC-ENTMCNC: 30.3 G/DL — LOW (ref 32–36)
MCV RBC AUTO: 87.5 FL — SIGNIFICANT CHANGE UP (ref 80–100)
NRBC # BLD AUTO: 0 K/UL — SIGNIFICANT CHANGE UP (ref 0–0)
NRBC # FLD: 0 K/UL — SIGNIFICANT CHANGE UP (ref 0–0)
NRBC BLD AUTO-RTO: 0 /100 WBCS — SIGNIFICANT CHANGE UP (ref 0–0)
PLATELET # BLD AUTO: 325 K/UL — SIGNIFICANT CHANGE UP (ref 150–400)
PMV BLD: 10.4 FL — SIGNIFICANT CHANGE UP (ref 7–13)
POTASSIUM SERPL-MCNC: 3.7 MMOL/L — SIGNIFICANT CHANGE UP (ref 3.5–5.3)
POTASSIUM SERPL-SCNC: 3.7 MMOL/L — SIGNIFICANT CHANGE UP (ref 3.5–5.3)
PROT SERPL-MCNC: 6.1 G/DL — SIGNIFICANT CHANGE UP (ref 6–8.3)
RBC # BLD: 4 M/UL — SIGNIFICANT CHANGE UP (ref 3.8–5.2)
RBC # FLD: 16.1 % — HIGH (ref 10.3–14.5)
SODIUM SERPL-SCNC: 143 MMOL/L — SIGNIFICANT CHANGE UP (ref 135–145)
WBC # BLD: 6.18 K/UL — SIGNIFICANT CHANGE UP (ref 3.8–10.5)
WBC # FLD AUTO: 6.18 K/UL — SIGNIFICANT CHANGE UP (ref 3.8–10.5)

## 2025-07-30 PROCEDURE — 99222 1ST HOSP IP/OBS MODERATE 55: CPT | Mod: 24

## 2025-07-30 PROCEDURE — 99232 SBSQ HOSP IP/OBS MODERATE 35: CPT

## 2025-07-30 RX ORDER — PRAMIPEXOLE DIHYDROCHLORIDE 1 MG/1
3.75 TABLET ORAL AT BEDTIME
Refills: 0 | Status: DISCONTINUED | OUTPATIENT
Start: 2025-07-30 | End: 2025-07-31

## 2025-07-30 RX ORDER — ENOXAPARIN SODIUM 100 MG/ML
40 INJECTION SUBCUTANEOUS
Refills: 0 | Status: DISCONTINUED | OUTPATIENT
Start: 2025-07-30 | End: 2025-07-31

## 2025-07-30 RX ORDER — LIDOCAINE HYDROCHLORIDE 20 MG/ML
1 JELLY TOPICAL DAILY
Refills: 0 | Status: DISCONTINUED | OUTPATIENT
Start: 2025-07-30 | End: 2025-07-31

## 2025-07-30 RX ADMIN — PRAMIPEXOLE DIHYDROCHLORIDE 3.75 MILLIGRAM(S): 1 TABLET ORAL at 21:19

## 2025-07-30 RX ADMIN — LIDOCAINE HYDROCHLORIDE 1 PATCH: 20 JELLY TOPICAL at 17:06

## 2025-07-30 RX ADMIN — Medication 2 TABLET(S): at 21:18

## 2025-07-30 RX ADMIN — GABAPENTIN 300 MILLIGRAM(S): 400 CAPSULE ORAL at 13:36

## 2025-07-30 RX ADMIN — GABAPENTIN 300 MILLIGRAM(S): 400 CAPSULE ORAL at 05:25

## 2025-07-30 RX ADMIN — METOPROLOL SUCCINATE 100 MILLIGRAM(S): 50 TABLET, EXTENDED RELEASE ORAL at 05:25

## 2025-07-30 RX ADMIN — LIDOCAINE HYDROCHLORIDE 1 PATCH: 20 JELLY TOPICAL at 19:32

## 2025-07-30 RX ADMIN — ENOXAPARIN SODIUM 40 MILLIGRAM(S): 100 INJECTION SUBCUTANEOUS at 17:06

## 2025-07-30 RX ADMIN — OXYCODONE HYDROCHLORIDE 10 MILLIGRAM(S): 30 TABLET ORAL at 01:06

## 2025-07-30 RX ADMIN — GABAPENTIN 300 MILLIGRAM(S): 400 CAPSULE ORAL at 21:18

## 2025-07-30 RX ADMIN — METHOCARBAMOL 500 MILLIGRAM(S): 500 TABLET, FILM COATED ORAL at 17:06

## 2025-07-30 RX ADMIN — POLYETHYLENE GLYCOL 3350 17 GRAM(S): 17 POWDER, FOR SOLUTION ORAL at 11:12

## 2025-07-30 RX ADMIN — OXYCODONE HYDROCHLORIDE 10 MILLIGRAM(S): 30 TABLET ORAL at 00:06

## 2025-07-30 RX ADMIN — METOPROLOL SUCCINATE 100 MILLIGRAM(S): 50 TABLET, EXTENDED RELEASE ORAL at 18:26

## 2025-07-30 RX ADMIN — METHOCARBAMOL 500 MILLIGRAM(S): 500 TABLET, FILM COATED ORAL at 00:40

## 2025-07-30 RX ADMIN — Medication 40 MILLIGRAM(S): at 11:12

## 2025-07-30 RX ADMIN — ATORVASTATIN CALCIUM 40 MILLIGRAM(S): 80 TABLET, FILM COATED ORAL at 21:17

## 2025-07-30 RX ADMIN — METHOCARBAMOL 500 MILLIGRAM(S): 500 TABLET, FILM COATED ORAL at 07:55

## 2025-07-31 ENCOUNTER — INPATIENT (INPATIENT)
Facility: HOSPITAL | Age: 75
LOS: 13 days | Discharge: HOME | DRG: 552 | End: 2025-08-14
Attending: PHYSICAL MEDICINE & REHABILITATION | Admitting: PHYSICAL MEDICINE & REHABILITATION
Payer: MEDICARE

## 2025-07-31 ENCOUNTER — TRANSCRIPTION ENCOUNTER (OUTPATIENT)
Age: 75
End: 2025-07-31

## 2025-07-31 VITALS
SYSTOLIC BLOOD PRESSURE: 112 MMHG | OXYGEN SATURATION: 97 % | HEART RATE: 70 BPM | HEIGHT: 64 IN | RESPIRATION RATE: 16 BRPM | WEIGHT: 193.35 LBS | DIASTOLIC BLOOD PRESSURE: 65 MMHG | TEMPERATURE: 98 F

## 2025-07-31 VITALS
RESPIRATION RATE: 18 BRPM | TEMPERATURE: 98 F | HEART RATE: 67 BPM | DIASTOLIC BLOOD PRESSURE: 67 MMHG | SYSTOLIC BLOOD PRESSURE: 115 MMHG | OXYGEN SATURATION: 98 %

## 2025-07-31 DIAGNOSIS — M79.604 PAIN IN RIGHT LEG: ICD-10-CM

## 2025-07-31 DIAGNOSIS — Z98.890 OTHER SPECIFIED POSTPROCEDURAL STATES: Chronic | ICD-10-CM

## 2025-07-31 DIAGNOSIS — Z96.643 PRESENCE OF ARTIFICIAL HIP JOINT, BILATERAL: Chronic | ICD-10-CM

## 2025-07-31 DIAGNOSIS — G25.81 RESTLESS LEGS SYNDROME: ICD-10-CM

## 2025-07-31 DIAGNOSIS — M51.16 INTERVERTEBRAL DISC DISORDERS WITH RADICULOPATHY, LUMBAR REGION: ICD-10-CM

## 2025-07-31 DIAGNOSIS — K21.9 GASTRO-ESOPHAGEAL REFLUX DISEASE WITHOUT ESOPHAGITIS: ICD-10-CM

## 2025-07-31 DIAGNOSIS — Z98.49 CATARACT EXTRACTION STATUS, UNSPECIFIED EYE: Chronic | ICD-10-CM

## 2025-07-31 PROCEDURE — 73502 X-RAY EXAM HIP UNI 2-3 VIEWS: CPT

## 2025-07-31 PROCEDURE — 80053 COMPREHEN METABOLIC PANEL: CPT

## 2025-07-31 PROCEDURE — 72158 MRI LUMBAR SPINE W/O & W/DYE: CPT

## 2025-07-31 PROCEDURE — 97162 PT EVAL MOD COMPLEX 30 MIN: CPT

## 2025-07-31 PROCEDURE — 72192 CT PELVIS W/O DYE: CPT

## 2025-07-31 PROCEDURE — 85025 COMPLETE CBC W/AUTO DIFF WBC: CPT

## 2025-07-31 PROCEDURE — 86140 C-REACTIVE PROTEIN: CPT

## 2025-07-31 PROCEDURE — A9585: CPT

## 2025-07-31 PROCEDURE — 99232 SBSQ HOSP IP/OBS MODERATE 35: CPT | Mod: FS

## 2025-07-31 PROCEDURE — 72131 CT LUMBAR SPINE W/O DYE: CPT

## 2025-07-31 PROCEDURE — 87040 BLOOD CULTURE FOR BACTERIA: CPT

## 2025-07-31 PROCEDURE — 83735 ASSAY OF MAGNESIUM: CPT

## 2025-07-31 PROCEDURE — 85652 RBC SED RATE AUTOMATED: CPT

## 2025-07-31 PROCEDURE — 72128 CT CHEST SPINE W/O DYE: CPT

## 2025-07-31 PROCEDURE — 99222 1ST HOSP IP/OBS MODERATE 55: CPT

## 2025-07-31 PROCEDURE — 36415 COLL VENOUS BLD VENIPUNCTURE: CPT

## 2025-07-31 PROCEDURE — 97165 OT EVAL LOW COMPLEX 30 MIN: CPT

## 2025-07-31 PROCEDURE — 93005 ELECTROCARDIOGRAM TRACING: CPT

## 2025-07-31 PROCEDURE — 85027 COMPLETE CBC AUTOMATED: CPT

## 2025-07-31 PROCEDURE — 73562 X-RAY EXAM OF KNEE 3: CPT

## 2025-07-31 PROCEDURE — 97530 THERAPEUTIC ACTIVITIES: CPT

## 2025-07-31 PROCEDURE — 97110 THERAPEUTIC EXERCISES: CPT

## 2025-07-31 PROCEDURE — 73552 X-RAY EXAM OF FEMUR 2/>: CPT

## 2025-07-31 PROCEDURE — 72170 X-RAY EXAM OF PELVIS: CPT

## 2025-07-31 PROCEDURE — 93970 EXTREMITY STUDY: CPT

## 2025-07-31 PROCEDURE — 72157 MRI CHEST SPINE W/O & W/DYE: CPT

## 2025-07-31 PROCEDURE — 99285 EMERGENCY DEPT VISIT HI MDM: CPT

## 2025-07-31 PROCEDURE — 76377 3D RENDER W/INTRP POSTPROCES: CPT

## 2025-07-31 RX ORDER — OXYCODONE HYDROCHLORIDE 30 MG/1
10 TABLET ORAL EVERY 4 HOURS
Refills: 0 | Status: DISCONTINUED | OUTPATIENT
Start: 2025-07-31 | End: 2025-08-05

## 2025-07-31 RX ORDER — SENNA 187 MG
2 TABLET ORAL AT BEDTIME
Refills: 0 | Status: DISCONTINUED | OUTPATIENT
Start: 2025-07-31 | End: 2025-08-14

## 2025-07-31 RX ORDER — POLYETHYLENE GLYCOL 3350 17 G/17G
17 POWDER, FOR SOLUTION ORAL DAILY
Refills: 0 | Status: DISCONTINUED | OUTPATIENT
Start: 2025-08-01 | End: 2025-08-01

## 2025-07-31 RX ORDER — OXYCODONE HYDROCHLORIDE 30 MG/1
1 TABLET ORAL
Qty: 0 | Refills: 0 | DISCHARGE
Start: 2025-07-31

## 2025-07-31 RX ORDER — GABAPENTIN 400 MG/1
1 CAPSULE ORAL
Qty: 0 | Refills: 0 | DISCHARGE
Start: 2025-07-31

## 2025-07-31 RX ORDER — POLYETHYLENE GLYCOL 3350 17 G/17G
17 POWDER, FOR SOLUTION ORAL
Qty: 0 | Refills: 0 | DISCHARGE
Start: 2025-07-31

## 2025-07-31 RX ORDER — LIDOCAINE HYDROCHLORIDE 20 MG/ML
1 JELLY TOPICAL DAILY
Refills: 0 | Status: DISCONTINUED | OUTPATIENT
Start: 2025-08-01 | End: 2025-08-14

## 2025-07-31 RX ORDER — OXYCODONE HYDROCHLORIDE 30 MG/1
5 TABLET ORAL EVERY 4 HOURS
Refills: 0 | Status: DISCONTINUED | OUTPATIENT
Start: 2025-07-31 | End: 2025-08-05

## 2025-07-31 RX ORDER — LIDOCAINE HYDROCHLORIDE 20 MG/ML
1 JELLY TOPICAL
Qty: 0 | Refills: 0 | DISCHARGE
Start: 2025-07-31

## 2025-07-31 RX ORDER — GABAPENTIN 400 MG/1
300 CAPSULE ORAL THREE TIMES A DAY
Refills: 0 | Status: DISCONTINUED | OUTPATIENT
Start: 2025-07-31 | End: 2025-08-08

## 2025-07-31 RX ORDER — ACETAMINOPHEN 500 MG/5ML
650 LIQUID (ML) ORAL EVERY 6 HOURS
Refills: 0 | Status: DISCONTINUED | OUTPATIENT
Start: 2025-07-31 | End: 2025-08-07

## 2025-07-31 RX ORDER — METOPROLOL SUCCINATE 50 MG/1
100 TABLET, EXTENDED RELEASE ORAL
Refills: 0 | Status: DISCONTINUED | OUTPATIENT
Start: 2025-07-31 | End: 2025-07-31

## 2025-07-31 RX ORDER — APIXABAN 5 MG/1
5 TABLET, FILM COATED ORAL EVERY 12 HOURS
Refills: 0 | Status: DISCONTINUED | OUTPATIENT
Start: 2025-07-31 | End: 2025-07-31

## 2025-07-31 RX ORDER — ATORVASTATIN CALCIUM 80 MG/1
40 TABLET, FILM COATED ORAL AT BEDTIME
Refills: 0 | Status: DISCONTINUED | OUTPATIENT
Start: 2025-07-31 | End: 2025-08-14

## 2025-07-31 RX ORDER — CYCLOBENZAPRINE HYDROCHLORIDE 15 MG/1
1 CAPSULE, EXTENDED RELEASE ORAL
Qty: 0 | Refills: 0 | DISCHARGE
Start: 2025-07-31

## 2025-07-31 RX ORDER — METOPROLOL SUCCINATE 50 MG/1
100 TABLET, EXTENDED RELEASE ORAL
Refills: 0 | Status: DISCONTINUED | OUTPATIENT
Start: 2025-08-01 | End: 2025-08-08

## 2025-07-31 RX ORDER — METHOCARBAMOL 500 MG/1
1 TABLET, FILM COATED ORAL
Qty: 0 | Refills: 0 | DISCHARGE
Start: 2025-07-31

## 2025-07-31 RX ORDER — ATORVASTATIN CALCIUM 80 MG/1
1 TABLET, FILM COATED ORAL
Qty: 0 | Refills: 0 | DISCHARGE
Start: 2025-07-31

## 2025-07-31 RX ORDER — PRAMIPEXOLE DIHYDROCHLORIDE 1 MG/1
1 TABLET ORAL
Qty: 0 | Refills: 0 | DISCHARGE
Start: 2025-07-31

## 2025-07-31 RX ORDER — FERROUS SULFATE 137(45) MG
1 TABLET, EXTENDED RELEASE ORAL
Qty: 0 | Refills: 0 | DISCHARGE
Start: 2025-07-31

## 2025-07-31 RX ORDER — METHOCARBAMOL 500 MG/1
500 TABLET, FILM COATED ORAL EVERY 8 HOURS
Refills: 0 | Status: DISCONTINUED | OUTPATIENT
Start: 2025-07-31 | End: 2025-08-14

## 2025-07-31 RX ORDER — APIXABAN 5 MG/1
1 TABLET, FILM COATED ORAL
Qty: 0 | Refills: 0 | DISCHARGE
Start: 2025-07-31

## 2025-07-31 RX ORDER — METOPROLOL SUCCINATE 50 MG/1
1 TABLET, EXTENDED RELEASE ORAL
Qty: 0 | Refills: 0 | DISCHARGE
Start: 2025-07-31

## 2025-07-31 RX ORDER — MELATONIN 5 MG
3 TABLET ORAL AT BEDTIME
Refills: 0 | Status: DISCONTINUED | OUTPATIENT
Start: 2025-07-31 | End: 2025-08-08

## 2025-07-31 RX ORDER — SENNA 187 MG
2 TABLET ORAL
Qty: 0 | Refills: 0 | DISCHARGE
Start: 2025-07-31

## 2025-07-31 RX ORDER — PRAMIPEXOLE DIHYDROCHLORIDE 1 MG/1
3.75 TABLET ORAL AT BEDTIME
Refills: 0 | Status: DISCONTINUED | OUTPATIENT
Start: 2025-07-31 | End: 2025-07-31

## 2025-07-31 RX ORDER — APIXABAN 5 MG/1
5 TABLET, FILM COATED ORAL
Refills: 0 | Status: DISCONTINUED | OUTPATIENT
Start: 2025-08-01 | End: 2025-08-14

## 2025-07-31 RX ORDER — HYDROCORTISONE 10 MG/G
1 CREAM TOPICAL
Refills: 0 | Status: DISCONTINUED | OUTPATIENT
Start: 2025-07-31 | End: 2025-08-14

## 2025-07-31 RX ORDER — FERROUS SULFATE 137(45) MG
325 TABLET, EXTENDED RELEASE ORAL DAILY
Refills: 0 | Status: DISCONTINUED | OUTPATIENT
Start: 2025-07-31 | End: 2025-07-31

## 2025-07-31 RX ORDER — CYCLOBENZAPRINE HYDROCHLORIDE 15 MG/1
5 CAPSULE, EXTENDED RELEASE ORAL THREE TIMES A DAY
Refills: 0 | Status: DISCONTINUED | OUTPATIENT
Start: 2025-07-31 | End: 2025-08-14

## 2025-07-31 RX ORDER — FERROUS SULFATE 137(45) MG
325 TABLET, EXTENDED RELEASE ORAL DAILY
Refills: 0 | Status: DISCONTINUED | OUTPATIENT
Start: 2025-08-01 | End: 2025-08-14

## 2025-07-31 RX ADMIN — OXYCODONE HYDROCHLORIDE 10 MILLIGRAM(S): 30 TABLET ORAL at 23:16

## 2025-07-31 RX ADMIN — CYCLOBENZAPRINE HYDROCHLORIDE 5 MILLIGRAM(S): 15 CAPSULE, EXTENDED RELEASE ORAL at 06:07

## 2025-07-31 RX ADMIN — LIDOCAINE HYDROCHLORIDE 1 PATCH: 20 JELLY TOPICAL at 04:59

## 2025-07-31 RX ADMIN — OXYCODONE HYDROCHLORIDE 10 MILLIGRAM(S): 30 TABLET ORAL at 17:10

## 2025-07-31 RX ADMIN — POLYETHYLENE GLYCOL 3350 17 GRAM(S): 17 POWDER, FOR SOLUTION ORAL at 12:03

## 2025-07-31 RX ADMIN — APIXABAN 5 MILLIGRAM(S): 5 TABLET, FILM COATED ORAL at 17:09

## 2025-07-31 RX ADMIN — METOPROLOL SUCCINATE 100 MILLIGRAM(S): 50 TABLET, EXTENDED RELEASE ORAL at 17:09

## 2025-07-31 RX ADMIN — OXYCODONE HYDROCHLORIDE 10 MILLIGRAM(S): 30 TABLET ORAL at 04:41

## 2025-07-31 RX ADMIN — Medication 2 TABLET(S): at 22:34

## 2025-07-31 RX ADMIN — METHOCARBAMOL 500 MILLIGRAM(S): 500 TABLET, FILM COATED ORAL at 18:33

## 2025-07-31 RX ADMIN — GABAPENTIN 300 MILLIGRAM(S): 400 CAPSULE ORAL at 13:49

## 2025-07-31 RX ADMIN — Medication 3 MILLIGRAM(S): at 23:16

## 2025-07-31 RX ADMIN — GABAPENTIN 300 MILLIGRAM(S): 400 CAPSULE ORAL at 06:08

## 2025-07-31 RX ADMIN — GABAPENTIN 300 MILLIGRAM(S): 400 CAPSULE ORAL at 22:34

## 2025-07-31 RX ADMIN — METHOCARBAMOL 500 MILLIGRAM(S): 500 TABLET, FILM COATED ORAL at 09:38

## 2025-07-31 RX ADMIN — LIDOCAINE HYDROCHLORIDE 1 PATCH: 20 JELLY TOPICAL at 18:42

## 2025-07-31 RX ADMIN — OXYCODONE HYDROCHLORIDE 10 MILLIGRAM(S): 30 TABLET ORAL at 18:10

## 2025-07-31 RX ADMIN — METOPROLOL SUCCINATE 100 MILLIGRAM(S): 50 TABLET, EXTENDED RELEASE ORAL at 06:07

## 2025-07-31 RX ADMIN — Medication 40 MILLIGRAM(S): at 12:03

## 2025-07-31 RX ADMIN — LIDOCAINE HYDROCHLORIDE 1 PATCH: 20 JELLY TOPICAL at 12:03

## 2025-07-31 RX ADMIN — Medication 325 MILLIGRAM(S): at 13:49

## 2025-07-31 RX ADMIN — OXYCODONE HYDROCHLORIDE 10 MILLIGRAM(S): 30 TABLET ORAL at 05:21

## 2025-07-31 RX ADMIN — ATORVASTATIN CALCIUM 40 MILLIGRAM(S): 80 TABLET, FILM COATED ORAL at 22:34

## 2025-08-01 LAB
ALBUMIN SERPL ELPH-MCNC: 3.1 G/DL — LOW (ref 3.3–5)
ALP SERPL-CCNC: 245 U/L — HIGH (ref 40–120)
ALT FLD-CCNC: 27 U/L — SIGNIFICANT CHANGE UP (ref 10–45)
ANION GAP SERPL CALC-SCNC: 9 MMOL/L — SIGNIFICANT CHANGE UP (ref 5–17)
AST SERPL-CCNC: 16 U/L — SIGNIFICANT CHANGE UP (ref 10–40)
BASOPHILS # BLD AUTO: 0.04 K/UL — SIGNIFICANT CHANGE UP (ref 0–0.2)
BASOPHILS NFR BLD AUTO: 0.8 % — SIGNIFICANT CHANGE UP (ref 0–2)
BILIRUB SERPL-MCNC: 0.4 MG/DL — SIGNIFICANT CHANGE UP (ref 0.2–1.2)
BUN SERPL-MCNC: 29 MG/DL — HIGH (ref 7–23)
CALCIUM SERPL-MCNC: 9.5 MG/DL — SIGNIFICANT CHANGE UP (ref 8.4–10.5)
CHLORIDE SERPL-SCNC: 103 MMOL/L — SIGNIFICANT CHANGE UP (ref 96–108)
CO2 SERPL-SCNC: 30 MMOL/L — SIGNIFICANT CHANGE UP (ref 22–31)
CREAT SERPL-MCNC: 0.8 MG/DL — SIGNIFICANT CHANGE UP (ref 0.5–1.3)
EGFR: 76 ML/MIN/1.73M2 — SIGNIFICANT CHANGE UP
EGFR: 76 ML/MIN/1.73M2 — SIGNIFICANT CHANGE UP
EOSINOPHIL # BLD AUTO: 0.12 K/UL — SIGNIFICANT CHANGE UP (ref 0–0.5)
EOSINOPHIL NFR BLD AUTO: 2.5 % — SIGNIFICANT CHANGE UP (ref 0–6)
GLUCOSE SERPL-MCNC: 107 MG/DL — HIGH (ref 70–99)
HCT VFR BLD CALC: 35.3 % — SIGNIFICANT CHANGE UP (ref 34.5–45)
HGB BLD-MCNC: 10.7 G/DL — LOW (ref 11.5–15.5)
IMM GRANULOCYTES NFR BLD AUTO: 0.2 % — SIGNIFICANT CHANGE UP (ref 0–0.9)
LYMPHOCYTES # BLD AUTO: 1.26 K/UL — SIGNIFICANT CHANGE UP (ref 1–3.3)
LYMPHOCYTES # BLD AUTO: 26.5 % — SIGNIFICANT CHANGE UP (ref 13–44)
MCHC RBC-ENTMCNC: 26.6 PG — LOW (ref 27–34)
MCHC RBC-ENTMCNC: 30.3 G/DL — LOW (ref 32–36)
MCV RBC AUTO: 87.8 FL — SIGNIFICANT CHANGE UP (ref 80–100)
MONOCYTES # BLD AUTO: 0.3 K/UL — SIGNIFICANT CHANGE UP (ref 0–0.9)
MONOCYTES NFR BLD AUTO: 6.3 % — SIGNIFICANT CHANGE UP (ref 2–14)
NEUTROPHILS # BLD AUTO: 3.03 K/UL — SIGNIFICANT CHANGE UP (ref 1.8–7.4)
NEUTROPHILS NFR BLD AUTO: 63.7 % — SIGNIFICANT CHANGE UP (ref 43–77)
NRBC BLD AUTO-RTO: 0 /100 WBCS — SIGNIFICANT CHANGE UP (ref 0–0)
PLATELET # BLD AUTO: 313 K/UL — SIGNIFICANT CHANGE UP (ref 150–400)
POTASSIUM SERPL-MCNC: 4.7 MMOL/L — SIGNIFICANT CHANGE UP (ref 3.5–5.3)
POTASSIUM SERPL-SCNC: 4.7 MMOL/L — SIGNIFICANT CHANGE UP (ref 3.5–5.3)
PROT SERPL-MCNC: 6.4 G/DL — SIGNIFICANT CHANGE UP (ref 6–8.3)
RBC # BLD: 4.02 M/UL — SIGNIFICANT CHANGE UP (ref 3.8–5.2)
RBC # FLD: 16.4 % — HIGH (ref 10.3–14.5)
SARS-COV-2 RNA SPEC QL NAA+PROBE: SIGNIFICANT CHANGE UP
SODIUM SERPL-SCNC: 142 MMOL/L — SIGNIFICANT CHANGE UP (ref 135–145)
WBC # BLD: 4.76 K/UL — SIGNIFICANT CHANGE UP (ref 3.8–10.5)
WBC # FLD AUTO: 4.76 K/UL — SIGNIFICANT CHANGE UP (ref 3.8–10.5)

## 2025-08-01 PROCEDURE — 99223 1ST HOSP IP/OBS HIGH 75: CPT

## 2025-08-01 RX ORDER — BISACODYL 5 MG
5 TABLET, DELAYED RELEASE (ENTERIC COATED) ORAL AT BEDTIME
Refills: 0 | Status: DISCONTINUED | OUTPATIENT
Start: 2025-08-01 | End: 2025-08-04

## 2025-08-01 RX ADMIN — APIXABAN 5 MILLIGRAM(S): 5 TABLET, FILM COATED ORAL at 05:26

## 2025-08-01 RX ADMIN — Medication 2 TABLET(S): at 21:44

## 2025-08-01 RX ADMIN — OXYCODONE HYDROCHLORIDE 10 MILLIGRAM(S): 30 TABLET ORAL at 00:16

## 2025-08-01 RX ADMIN — GABAPENTIN 300 MILLIGRAM(S): 400 CAPSULE ORAL at 05:26

## 2025-08-01 RX ADMIN — OXYCODONE HYDROCHLORIDE 10 MILLIGRAM(S): 30 TABLET ORAL at 16:27

## 2025-08-01 RX ADMIN — ATORVASTATIN CALCIUM 40 MILLIGRAM(S): 80 TABLET, FILM COATED ORAL at 21:44

## 2025-08-01 RX ADMIN — METOPROLOL SUCCINATE 100 MILLIGRAM(S): 50 TABLET, EXTENDED RELEASE ORAL at 17:01

## 2025-08-01 RX ADMIN — GABAPENTIN 300 MILLIGRAM(S): 400 CAPSULE ORAL at 13:07

## 2025-08-01 RX ADMIN — OXYCODONE HYDROCHLORIDE 10 MILLIGRAM(S): 30 TABLET ORAL at 17:15

## 2025-08-01 RX ADMIN — APIXABAN 5 MILLIGRAM(S): 5 TABLET, FILM COATED ORAL at 17:02

## 2025-08-01 RX ADMIN — Medication 5 MILLIGRAM(S): at 21:42

## 2025-08-01 RX ADMIN — METOPROLOL SUCCINATE 100 MILLIGRAM(S): 50 TABLET, EXTENDED RELEASE ORAL at 05:26

## 2025-08-01 RX ADMIN — METHOCARBAMOL 500 MILLIGRAM(S): 500 TABLET, FILM COATED ORAL at 13:08

## 2025-08-01 RX ADMIN — Medication 325 MILLIGRAM(S): at 13:07

## 2025-08-01 RX ADMIN — GABAPENTIN 300 MILLIGRAM(S): 400 CAPSULE ORAL at 21:42

## 2025-08-01 RX ADMIN — METHOCARBAMOL 500 MILLIGRAM(S): 500 TABLET, FILM COATED ORAL at 05:25

## 2025-08-01 RX ADMIN — METHOCARBAMOL 500 MILLIGRAM(S): 500 TABLET, FILM COATED ORAL at 21:43

## 2025-08-02 PROCEDURE — 99232 SBSQ HOSP IP/OBS MODERATE 35: CPT

## 2025-08-02 RX ORDER — LIDOCAINE HYDROCHLORIDE 20 MG/ML
1 JELLY TOPICAL
Refills: 0 | Status: DISCONTINUED | OUTPATIENT
Start: 2025-08-02 | End: 2025-08-14

## 2025-08-02 RX ORDER — LIDOCAINE HYDROCHLORIDE 20 MG/ML
1 JELLY TOPICAL
Refills: 0 | Status: DISCONTINUED | OUTPATIENT
Start: 2025-08-02 | End: 2025-08-02

## 2025-08-02 RX ADMIN — METHOCARBAMOL 500 MILLIGRAM(S): 500 TABLET, FILM COATED ORAL at 05:28

## 2025-08-02 RX ADMIN — Medication 40 MILLIGRAM(S): at 05:28

## 2025-08-02 RX ADMIN — OXYCODONE HYDROCHLORIDE 10 MILLIGRAM(S): 30 TABLET ORAL at 08:22

## 2025-08-02 RX ADMIN — APIXABAN 5 MILLIGRAM(S): 5 TABLET, FILM COATED ORAL at 17:40

## 2025-08-02 RX ADMIN — METOPROLOL SUCCINATE 100 MILLIGRAM(S): 50 TABLET, EXTENDED RELEASE ORAL at 17:40

## 2025-08-02 RX ADMIN — Medication 325 MILLIGRAM(S): at 12:32

## 2025-08-02 RX ADMIN — GABAPENTIN 300 MILLIGRAM(S): 400 CAPSULE ORAL at 21:34

## 2025-08-02 RX ADMIN — METHOCARBAMOL 500 MILLIGRAM(S): 500 TABLET, FILM COATED ORAL at 13:04

## 2025-08-02 RX ADMIN — METOPROLOL SUCCINATE 100 MILLIGRAM(S): 50 TABLET, EXTENDED RELEASE ORAL at 05:28

## 2025-08-02 RX ADMIN — Medication 2 TABLET(S): at 21:35

## 2025-08-02 RX ADMIN — LIDOCAINE HYDROCHLORIDE 1 PATCH: 20 JELLY TOPICAL at 19:00

## 2025-08-02 RX ADMIN — APIXABAN 5 MILLIGRAM(S): 5 TABLET, FILM COATED ORAL at 05:28

## 2025-08-02 RX ADMIN — LIDOCAINE HYDROCHLORIDE 1 PATCH: 20 JELLY TOPICAL at 12:31

## 2025-08-02 RX ADMIN — GABAPENTIN 300 MILLIGRAM(S): 400 CAPSULE ORAL at 05:27

## 2025-08-02 RX ADMIN — Medication 5 MILLIGRAM(S): at 21:35

## 2025-08-02 RX ADMIN — OXYCODONE HYDROCHLORIDE 10 MILLIGRAM(S): 30 TABLET ORAL at 09:15

## 2025-08-02 RX ADMIN — METHOCARBAMOL 500 MILLIGRAM(S): 500 TABLET, FILM COATED ORAL at 21:35

## 2025-08-02 RX ADMIN — GABAPENTIN 300 MILLIGRAM(S): 400 CAPSULE ORAL at 13:04

## 2025-08-02 RX ADMIN — ATORVASTATIN CALCIUM 40 MILLIGRAM(S): 80 TABLET, FILM COATED ORAL at 21:35

## 2025-08-03 LAB
CULTURE RESULTS: SIGNIFICANT CHANGE UP
CULTURE RESULTS: SIGNIFICANT CHANGE UP
SPECIMEN SOURCE: SIGNIFICANT CHANGE UP
SPECIMEN SOURCE: SIGNIFICANT CHANGE UP

## 2025-08-03 PROCEDURE — 99232 SBSQ HOSP IP/OBS MODERATE 35: CPT

## 2025-08-03 RX ORDER — DICLOFENAC SODIUM 10 MG/G
2 GEL TOPICAL THREE TIMES A DAY
Refills: 0 | Status: DISCONTINUED | OUTPATIENT
Start: 2025-08-03 | End: 2025-08-14

## 2025-08-03 RX ADMIN — LIDOCAINE HYDROCHLORIDE 1 PATCH: 20 JELLY TOPICAL at 12:06

## 2025-08-03 RX ADMIN — METHOCARBAMOL 500 MILLIGRAM(S): 500 TABLET, FILM COATED ORAL at 21:54

## 2025-08-03 RX ADMIN — OXYCODONE HYDROCHLORIDE 10 MILLIGRAM(S): 30 TABLET ORAL at 23:15

## 2025-08-03 RX ADMIN — ATORVASTATIN CALCIUM 40 MILLIGRAM(S): 80 TABLET, FILM COATED ORAL at 21:55

## 2025-08-03 RX ADMIN — METHOCARBAMOL 500 MILLIGRAM(S): 500 TABLET, FILM COATED ORAL at 14:21

## 2025-08-03 RX ADMIN — METOPROLOL SUCCINATE 100 MILLIGRAM(S): 50 TABLET, EXTENDED RELEASE ORAL at 17:47

## 2025-08-03 RX ADMIN — APIXABAN 5 MILLIGRAM(S): 5 TABLET, FILM COATED ORAL at 17:47

## 2025-08-03 RX ADMIN — Medication 325 MILLIGRAM(S): at 12:05

## 2025-08-03 RX ADMIN — GABAPENTIN 300 MILLIGRAM(S): 400 CAPSULE ORAL at 14:21

## 2025-08-03 RX ADMIN — LIDOCAINE HYDROCHLORIDE 1 PATCH: 20 JELLY TOPICAL at 00:31

## 2025-08-03 RX ADMIN — APIXABAN 5 MILLIGRAM(S): 5 TABLET, FILM COATED ORAL at 05:23

## 2025-08-03 RX ADMIN — METHOCARBAMOL 500 MILLIGRAM(S): 500 TABLET, FILM COATED ORAL at 05:21

## 2025-08-03 RX ADMIN — METOPROLOL SUCCINATE 100 MILLIGRAM(S): 50 TABLET, EXTENDED RELEASE ORAL at 05:21

## 2025-08-03 RX ADMIN — Medication 3 MILLIGRAM(S): at 22:11

## 2025-08-03 RX ADMIN — DICLOFENAC SODIUM 2 GRAM(S): 10 GEL TOPICAL at 12:06

## 2025-08-03 RX ADMIN — OXYCODONE HYDROCHLORIDE 10 MILLIGRAM(S): 30 TABLET ORAL at 22:49

## 2025-08-03 RX ADMIN — GABAPENTIN 300 MILLIGRAM(S): 400 CAPSULE ORAL at 05:21

## 2025-08-03 RX ADMIN — Medication 40 MILLIGRAM(S): at 05:21

## 2025-08-03 RX ADMIN — Medication 5 MILLIGRAM(S): at 21:59

## 2025-08-03 RX ADMIN — GABAPENTIN 300 MILLIGRAM(S): 400 CAPSULE ORAL at 21:55

## 2025-08-03 RX ADMIN — Medication 2 TABLET(S): at 21:55

## 2025-08-04 LAB
ALBUMIN SERPL ELPH-MCNC: 2.9 G/DL — LOW (ref 3.3–5)
ALP SERPL-CCNC: 248 U/L — HIGH (ref 40–120)
ALT FLD-CCNC: 23 U/L — SIGNIFICANT CHANGE UP (ref 10–45)
ANION GAP SERPL CALC-SCNC: 8 MMOL/L — SIGNIFICANT CHANGE UP (ref 5–17)
AST SERPL-CCNC: 11 U/L — SIGNIFICANT CHANGE UP (ref 10–40)
BILIRUB SERPL-MCNC: 0.4 MG/DL — SIGNIFICANT CHANGE UP (ref 0.2–1.2)
BUN SERPL-MCNC: 21 MG/DL — SIGNIFICANT CHANGE UP (ref 7–23)
CALCIUM SERPL-MCNC: 9.3 MG/DL — SIGNIFICANT CHANGE UP (ref 8.4–10.5)
CHLORIDE SERPL-SCNC: 107 MMOL/L — SIGNIFICANT CHANGE UP (ref 96–108)
CO2 SERPL-SCNC: 29 MMOL/L — SIGNIFICANT CHANGE UP (ref 22–31)
CREAT SERPL-MCNC: 0.63 MG/DL — SIGNIFICANT CHANGE UP (ref 0.5–1.3)
EGFR: 91 ML/MIN/1.73M2 — SIGNIFICANT CHANGE UP
EGFR: 91 ML/MIN/1.73M2 — SIGNIFICANT CHANGE UP
GLUCOSE SERPL-MCNC: 113 MG/DL — HIGH (ref 70–99)
HCT VFR BLD CALC: 33.9 % — LOW (ref 34.5–45)
HGB BLD-MCNC: 10.4 G/DL — LOW (ref 11.5–15.5)
MCHC RBC-ENTMCNC: 26.5 PG — LOW (ref 27–34)
MCHC RBC-ENTMCNC: 30.7 G/DL — LOW (ref 32–36)
MCV RBC AUTO: 86.5 FL — SIGNIFICANT CHANGE UP (ref 80–100)
NRBC BLD AUTO-RTO: 0 /100 WBCS — SIGNIFICANT CHANGE UP (ref 0–0)
PLATELET # BLD AUTO: 297 K/UL — SIGNIFICANT CHANGE UP (ref 150–400)
POTASSIUM SERPL-MCNC: 4 MMOL/L — SIGNIFICANT CHANGE UP (ref 3.5–5.3)
POTASSIUM SERPL-SCNC: 4 MMOL/L — SIGNIFICANT CHANGE UP (ref 3.5–5.3)
PROT SERPL-MCNC: 6.3 G/DL — SIGNIFICANT CHANGE UP (ref 6–8.3)
RBC # BLD: 3.92 M/UL — SIGNIFICANT CHANGE UP (ref 3.8–5.2)
RBC # FLD: 16.6 % — HIGH (ref 10.3–14.5)
SODIUM SERPL-SCNC: 144 MMOL/L — SIGNIFICANT CHANGE UP (ref 135–145)
WBC # BLD: 5.28 K/UL — SIGNIFICANT CHANGE UP (ref 3.8–10.5)
WBC # FLD AUTO: 5.28 K/UL — SIGNIFICANT CHANGE UP (ref 3.8–10.5)

## 2025-08-04 PROCEDURE — 85025 COMPLETE CBC W/AUTO DIFF WBC: CPT

## 2025-08-04 PROCEDURE — 97530 THERAPEUTIC ACTIVITIES: CPT | Mod: GP

## 2025-08-04 PROCEDURE — 80053 COMPREHEN METABOLIC PANEL: CPT

## 2025-08-04 PROCEDURE — 36415 COLL VENOUS BLD VENIPUNCTURE: CPT

## 2025-08-04 PROCEDURE — 87635 SARS-COV-2 COVID-19 AMP PRB: CPT

## 2025-08-04 PROCEDURE — 97165 OT EVAL LOW COMPLEX 30 MIN: CPT | Mod: GO

## 2025-08-04 PROCEDURE — 97110 THERAPEUTIC EXERCISES: CPT | Mod: GP

## 2025-08-04 PROCEDURE — 97535 SELF CARE MNGMENT TRAINING: CPT | Mod: GO

## 2025-08-04 PROCEDURE — 97116 GAIT TRAINING THERAPY: CPT | Mod: GP

## 2025-08-04 PROCEDURE — 99232 SBSQ HOSP IP/OBS MODERATE 35: CPT

## 2025-08-04 PROCEDURE — 97161 PT EVAL LOW COMPLEX 20 MIN: CPT | Mod: GP

## 2025-08-04 PROCEDURE — 85027 COMPLETE CBC AUTOMATED: CPT

## 2025-08-04 RX ADMIN — GABAPENTIN 300 MILLIGRAM(S): 400 CAPSULE ORAL at 13:34

## 2025-08-04 RX ADMIN — METHOCARBAMOL 500 MILLIGRAM(S): 500 TABLET, FILM COATED ORAL at 22:04

## 2025-08-04 RX ADMIN — GABAPENTIN 300 MILLIGRAM(S): 400 CAPSULE ORAL at 05:39

## 2025-08-04 RX ADMIN — GABAPENTIN 300 MILLIGRAM(S): 400 CAPSULE ORAL at 22:04

## 2025-08-04 RX ADMIN — Medication 325 MILLIGRAM(S): at 12:23

## 2025-08-04 RX ADMIN — OXYCODONE HYDROCHLORIDE 10 MILLIGRAM(S): 30 TABLET ORAL at 22:34

## 2025-08-04 RX ADMIN — APIXABAN 5 MILLIGRAM(S): 5 TABLET, FILM COATED ORAL at 05:39

## 2025-08-04 RX ADMIN — Medication 2 TABLET(S): at 22:03

## 2025-08-04 RX ADMIN — METHOCARBAMOL 500 MILLIGRAM(S): 500 TABLET, FILM COATED ORAL at 13:34

## 2025-08-04 RX ADMIN — ATORVASTATIN CALCIUM 40 MILLIGRAM(S): 80 TABLET, FILM COATED ORAL at 22:04

## 2025-08-04 RX ADMIN — Medication 3 MILLIGRAM(S): at 22:04

## 2025-08-04 RX ADMIN — LIDOCAINE HYDROCHLORIDE 1 PATCH: 20 JELLY TOPICAL at 00:01

## 2025-08-04 RX ADMIN — METOPROLOL SUCCINATE 100 MILLIGRAM(S): 50 TABLET, EXTENDED RELEASE ORAL at 05:40

## 2025-08-04 RX ADMIN — Medication 40 MILLIGRAM(S): at 06:06

## 2025-08-04 RX ADMIN — METHOCARBAMOL 500 MILLIGRAM(S): 500 TABLET, FILM COATED ORAL at 05:39

## 2025-08-04 RX ADMIN — METOPROLOL SUCCINATE 100 MILLIGRAM(S): 50 TABLET, EXTENDED RELEASE ORAL at 17:16

## 2025-08-04 RX ADMIN — OXYCODONE HYDROCHLORIDE 10 MILLIGRAM(S): 30 TABLET ORAL at 22:04

## 2025-08-04 RX ADMIN — APIXABAN 5 MILLIGRAM(S): 5 TABLET, FILM COATED ORAL at 17:16

## 2025-08-05 PROCEDURE — 99232 SBSQ HOSP IP/OBS MODERATE 35: CPT

## 2025-08-05 RX ORDER — OXYCODONE HYDROCHLORIDE 30 MG/1
10 TABLET ORAL EVERY 4 HOURS
Refills: 0 | Status: DISCONTINUED | OUTPATIENT
Start: 2025-08-05 | End: 2025-08-11

## 2025-08-05 RX ORDER — OXYCODONE HYDROCHLORIDE 30 MG/1
5 TABLET ORAL EVERY 4 HOURS
Refills: 0 | Status: DISCONTINUED | OUTPATIENT
Start: 2025-08-05 | End: 2025-08-11

## 2025-08-05 RX ADMIN — GABAPENTIN 300 MILLIGRAM(S): 400 CAPSULE ORAL at 13:13

## 2025-08-05 RX ADMIN — METOPROLOL SUCCINATE 100 MILLIGRAM(S): 50 TABLET, EXTENDED RELEASE ORAL at 05:51

## 2025-08-05 RX ADMIN — OXYCODONE HYDROCHLORIDE 5 MILLIGRAM(S): 30 TABLET ORAL at 13:17

## 2025-08-05 RX ADMIN — OXYCODONE HYDROCHLORIDE 10 MILLIGRAM(S): 30 TABLET ORAL at 22:14

## 2025-08-05 RX ADMIN — Medication 2 TABLET(S): at 22:02

## 2025-08-05 RX ADMIN — Medication 325 MILLIGRAM(S): at 12:16

## 2025-08-05 RX ADMIN — GABAPENTIN 300 MILLIGRAM(S): 400 CAPSULE ORAL at 05:50

## 2025-08-05 RX ADMIN — OXYCODONE HYDROCHLORIDE 5 MILLIGRAM(S): 30 TABLET ORAL at 08:14

## 2025-08-05 RX ADMIN — Medication 40 MILLIGRAM(S): at 06:02

## 2025-08-05 RX ADMIN — METHOCARBAMOL 500 MILLIGRAM(S): 500 TABLET, FILM COATED ORAL at 13:13

## 2025-08-05 RX ADMIN — GABAPENTIN 300 MILLIGRAM(S): 400 CAPSULE ORAL at 23:22

## 2025-08-05 RX ADMIN — METHOCARBAMOL 500 MILLIGRAM(S): 500 TABLET, FILM COATED ORAL at 22:02

## 2025-08-05 RX ADMIN — APIXABAN 5 MILLIGRAM(S): 5 TABLET, FILM COATED ORAL at 05:50

## 2025-08-05 RX ADMIN — ATORVASTATIN CALCIUM 40 MILLIGRAM(S): 80 TABLET, FILM COATED ORAL at 22:02

## 2025-08-05 RX ADMIN — METOPROLOL SUCCINATE 100 MILLIGRAM(S): 50 TABLET, EXTENDED RELEASE ORAL at 17:55

## 2025-08-05 RX ADMIN — APIXABAN 5 MILLIGRAM(S): 5 TABLET, FILM COATED ORAL at 17:56

## 2025-08-05 RX ADMIN — Medication 3 MILLIGRAM(S): at 22:14

## 2025-08-05 RX ADMIN — METHOCARBAMOL 500 MILLIGRAM(S): 500 TABLET, FILM COATED ORAL at 05:50

## 2025-08-06 PROCEDURE — 99232 SBSQ HOSP IP/OBS MODERATE 35: CPT

## 2025-08-06 RX ORDER — BISACODYL 5 MG
10 TABLET, DELAYED RELEASE (ENTERIC COATED) ORAL ONCE
Refills: 0 | Status: DISCONTINUED | OUTPATIENT
Start: 2025-08-06 | End: 2025-08-06

## 2025-08-06 RX ORDER — BISACODYL 5 MG
5 TABLET, DELAYED RELEASE (ENTERIC COATED) ORAL AT BEDTIME
Refills: 0 | Status: DISCONTINUED | OUTPATIENT
Start: 2025-08-06 | End: 2025-08-14

## 2025-08-06 RX ADMIN — Medication 5 MILLIGRAM(S): at 23:03

## 2025-08-06 RX ADMIN — METOPROLOL SUCCINATE 100 MILLIGRAM(S): 50 TABLET, EXTENDED RELEASE ORAL at 17:18

## 2025-08-06 RX ADMIN — Medication 40 MILLIGRAM(S): at 06:39

## 2025-08-06 RX ADMIN — Medication 2 TABLET(S): at 21:05

## 2025-08-06 RX ADMIN — Medication 650 MILLIGRAM(S): at 12:30

## 2025-08-06 RX ADMIN — GABAPENTIN 300 MILLIGRAM(S): 400 CAPSULE ORAL at 21:05

## 2025-08-06 RX ADMIN — Medication 325 MILLIGRAM(S): at 12:01

## 2025-08-06 RX ADMIN — APIXABAN 5 MILLIGRAM(S): 5 TABLET, FILM COATED ORAL at 06:39

## 2025-08-06 RX ADMIN — GABAPENTIN 300 MILLIGRAM(S): 400 CAPSULE ORAL at 15:08

## 2025-08-06 RX ADMIN — METHOCARBAMOL 500 MILLIGRAM(S): 500 TABLET, FILM COATED ORAL at 21:04

## 2025-08-06 RX ADMIN — ATORVASTATIN CALCIUM 40 MILLIGRAM(S): 80 TABLET, FILM COATED ORAL at 21:03

## 2025-08-06 RX ADMIN — DICLOFENAC SODIUM 2 GRAM(S): 10 GEL TOPICAL at 12:19

## 2025-08-06 RX ADMIN — Medication 3 MILLIGRAM(S): at 21:05

## 2025-08-06 RX ADMIN — METHOCARBAMOL 500 MILLIGRAM(S): 500 TABLET, FILM COATED ORAL at 06:39

## 2025-08-06 RX ADMIN — APIXABAN 5 MILLIGRAM(S): 5 TABLET, FILM COATED ORAL at 17:18

## 2025-08-06 RX ADMIN — OXYCODONE HYDROCHLORIDE 10 MILLIGRAM(S): 30 TABLET ORAL at 21:06

## 2025-08-06 RX ADMIN — OXYCODONE HYDROCHLORIDE 10 MILLIGRAM(S): 30 TABLET ORAL at 22:00

## 2025-08-06 RX ADMIN — GABAPENTIN 300 MILLIGRAM(S): 400 CAPSULE ORAL at 06:38

## 2025-08-06 RX ADMIN — Medication 650 MILLIGRAM(S): at 12:02

## 2025-08-06 RX ADMIN — METHOCARBAMOL 500 MILLIGRAM(S): 500 TABLET, FILM COATED ORAL at 15:08

## 2025-08-06 RX ADMIN — METOPROLOL SUCCINATE 100 MILLIGRAM(S): 50 TABLET, EXTENDED RELEASE ORAL at 06:39

## 2025-08-07 LAB
ALBUMIN SERPL ELPH-MCNC: 2.9 G/DL — LOW (ref 3.3–5)
ALP SERPL-CCNC: 254 U/L — HIGH (ref 40–120)
ALT FLD-CCNC: 23 U/L — SIGNIFICANT CHANGE UP (ref 10–45)
ANION GAP SERPL CALC-SCNC: 8 MMOL/L — SIGNIFICANT CHANGE UP (ref 5–17)
AST SERPL-CCNC: 18 U/L — SIGNIFICANT CHANGE UP (ref 10–40)
BILIRUB SERPL-MCNC: 0.2 MG/DL — SIGNIFICANT CHANGE UP (ref 0.2–1.2)
BUN SERPL-MCNC: 26 MG/DL — HIGH (ref 7–23)
CALCIUM SERPL-MCNC: 9.1 MG/DL — SIGNIFICANT CHANGE UP (ref 8.4–10.5)
CHLORIDE SERPL-SCNC: 107 MMOL/L — SIGNIFICANT CHANGE UP (ref 96–108)
CO2 SERPL-SCNC: 28 MMOL/L — SIGNIFICANT CHANGE UP (ref 22–31)
CREAT SERPL-MCNC: 0.84 MG/DL — SIGNIFICANT CHANGE UP (ref 0.5–1.3)
EGFR: 71 ML/MIN/1.73M2 — SIGNIFICANT CHANGE UP
EGFR: 71 ML/MIN/1.73M2 — SIGNIFICANT CHANGE UP
GLUCOSE SERPL-MCNC: 108 MG/DL — HIGH (ref 70–99)
HCT VFR BLD CALC: 32.1 % — LOW (ref 34.5–45)
HGB BLD-MCNC: 10 G/DL — LOW (ref 11.5–15.5)
MCHC RBC-ENTMCNC: 27.3 PG — SIGNIFICANT CHANGE UP (ref 27–34)
MCHC RBC-ENTMCNC: 31.2 G/DL — LOW (ref 32–36)
MCV RBC AUTO: 87.7 FL — SIGNIFICANT CHANGE UP (ref 80–100)
NRBC BLD AUTO-RTO: 0 /100 WBCS — SIGNIFICANT CHANGE UP (ref 0–0)
PLATELET # BLD AUTO: 299 K/UL — SIGNIFICANT CHANGE UP (ref 150–400)
POTASSIUM SERPL-MCNC: 3.7 MMOL/L — SIGNIFICANT CHANGE UP (ref 3.5–5.3)
POTASSIUM SERPL-SCNC: 3.7 MMOL/L — SIGNIFICANT CHANGE UP (ref 3.5–5.3)
PROT SERPL-MCNC: 6.2 G/DL — SIGNIFICANT CHANGE UP (ref 6–8.3)
RBC # BLD: 3.66 M/UL — LOW (ref 3.8–5.2)
RBC # FLD: 16.3 % — HIGH (ref 10.3–14.5)
SODIUM SERPL-SCNC: 143 MMOL/L — SIGNIFICANT CHANGE UP (ref 135–145)
WBC # BLD: 4.93 K/UL — SIGNIFICANT CHANGE UP (ref 3.8–10.5)
WBC # FLD AUTO: 4.93 K/UL — SIGNIFICANT CHANGE UP (ref 3.8–10.5)

## 2025-08-07 PROCEDURE — 99232 SBSQ HOSP IP/OBS MODERATE 35: CPT

## 2025-08-07 PROCEDURE — 97116 GAIT TRAINING THERAPY: CPT | Mod: GP

## 2025-08-07 PROCEDURE — 80053 COMPREHEN METABOLIC PANEL: CPT

## 2025-08-07 PROCEDURE — 96116 NUBHVL XM PHYS/QHP 1ST HR: CPT

## 2025-08-07 PROCEDURE — 85027 COMPLETE CBC AUTOMATED: CPT

## 2025-08-07 PROCEDURE — 85025 COMPLETE CBC W/AUTO DIFF WBC: CPT

## 2025-08-07 PROCEDURE — 36415 COLL VENOUS BLD VENIPUNCTURE: CPT

## 2025-08-07 PROCEDURE — 96121 NUBHVL XM PHY/QHP EA ADDL HR: CPT

## 2025-08-07 PROCEDURE — 97161 PT EVAL LOW COMPLEX 20 MIN: CPT | Mod: GP

## 2025-08-07 PROCEDURE — 97165 OT EVAL LOW COMPLEX 30 MIN: CPT | Mod: GO

## 2025-08-07 PROCEDURE — 97535 SELF CARE MNGMENT TRAINING: CPT | Mod: GO

## 2025-08-07 PROCEDURE — 97530 THERAPEUTIC ACTIVITIES: CPT | Mod: GP

## 2025-08-07 PROCEDURE — 97110 THERAPEUTIC EXERCISES: CPT | Mod: GO

## 2025-08-07 PROCEDURE — 87635 SARS-COV-2 COVID-19 AMP PRB: CPT

## 2025-08-07 RX ORDER — POLYETHYLENE GLYCOL 3350 17 G/17G
17 POWDER, FOR SOLUTION ORAL DAILY
Refills: 0 | Status: DISCONTINUED | OUTPATIENT
Start: 2025-08-07 | End: 2025-08-08

## 2025-08-07 RX ORDER — ACETAMINOPHEN 500 MG/5ML
975 LIQUID (ML) ORAL EVERY 8 HOURS
Refills: 0 | Status: DISCONTINUED | OUTPATIENT
Start: 2025-08-07 | End: 2025-08-14

## 2025-08-07 RX ADMIN — Medication 650 MILLIGRAM(S): at 11:30

## 2025-08-07 RX ADMIN — APIXABAN 5 MILLIGRAM(S): 5 TABLET, FILM COATED ORAL at 17:30

## 2025-08-07 RX ADMIN — METHOCARBAMOL 500 MILLIGRAM(S): 500 TABLET, FILM COATED ORAL at 05:13

## 2025-08-07 RX ADMIN — GABAPENTIN 300 MILLIGRAM(S): 400 CAPSULE ORAL at 05:13

## 2025-08-07 RX ADMIN — METOPROLOL SUCCINATE 100 MILLIGRAM(S): 50 TABLET, EXTENDED RELEASE ORAL at 17:30

## 2025-08-07 RX ADMIN — Medication 2 TABLET(S): at 21:30

## 2025-08-07 RX ADMIN — OXYCODONE HYDROCHLORIDE 10 MILLIGRAM(S): 30 TABLET ORAL at 21:29

## 2025-08-07 RX ADMIN — GABAPENTIN 300 MILLIGRAM(S): 400 CAPSULE ORAL at 21:30

## 2025-08-07 RX ADMIN — OXYCODONE HYDROCHLORIDE 10 MILLIGRAM(S): 30 TABLET ORAL at 22:20

## 2025-08-07 RX ADMIN — APIXABAN 5 MILLIGRAM(S): 5 TABLET, FILM COATED ORAL at 05:13

## 2025-08-07 RX ADMIN — Medication 40 MILLIGRAM(S): at 05:13

## 2025-08-07 RX ADMIN — METHOCARBAMOL 500 MILLIGRAM(S): 500 TABLET, FILM COATED ORAL at 14:56

## 2025-08-07 RX ADMIN — Medication 325 MILLIGRAM(S): at 11:01

## 2025-08-07 RX ADMIN — GABAPENTIN 300 MILLIGRAM(S): 400 CAPSULE ORAL at 14:56

## 2025-08-07 RX ADMIN — METOPROLOL SUCCINATE 100 MILLIGRAM(S): 50 TABLET, EXTENDED RELEASE ORAL at 05:13

## 2025-08-07 RX ADMIN — Medication 3 MILLIGRAM(S): at 21:29

## 2025-08-07 RX ADMIN — METHOCARBAMOL 500 MILLIGRAM(S): 500 TABLET, FILM COATED ORAL at 21:30

## 2025-08-07 RX ADMIN — ATORVASTATIN CALCIUM 40 MILLIGRAM(S): 80 TABLET, FILM COATED ORAL at 21:30

## 2025-08-07 RX ADMIN — Medication 650 MILLIGRAM(S): at 11:01

## 2025-08-08 PROCEDURE — 99232 SBSQ HOSP IP/OBS MODERATE 35: CPT

## 2025-08-08 RX ORDER — METOPROLOL SUCCINATE 50 MG/1
50 TABLET, EXTENDED RELEASE ORAL
Refills: 0 | Status: DISCONTINUED | OUTPATIENT
Start: 2025-08-08 | End: 2025-08-14

## 2025-08-08 RX ORDER — MELATONIN 5 MG
3 TABLET ORAL AT BEDTIME
Refills: 0 | Status: DISCONTINUED | OUTPATIENT
Start: 2025-08-08 | End: 2025-08-08

## 2025-08-08 RX ORDER — MELATONIN 5 MG
6 TABLET ORAL AT BEDTIME
Refills: 0 | Status: DISCONTINUED | OUTPATIENT
Start: 2025-08-08 | End: 2025-08-14

## 2025-08-08 RX ORDER — GABAPENTIN 400 MG/1
200 CAPSULE ORAL THREE TIMES A DAY
Refills: 0 | Status: DISCONTINUED | OUTPATIENT
Start: 2025-08-08 | End: 2025-08-14

## 2025-08-08 RX ADMIN — APIXABAN 5 MILLIGRAM(S): 5 TABLET, FILM COATED ORAL at 17:31

## 2025-08-08 RX ADMIN — OXYCODONE HYDROCHLORIDE 10 MILLIGRAM(S): 30 TABLET ORAL at 21:01

## 2025-08-08 RX ADMIN — APIXABAN 5 MILLIGRAM(S): 5 TABLET, FILM COATED ORAL at 05:04

## 2025-08-08 RX ADMIN — Medication 6 MILLIGRAM(S): at 21:01

## 2025-08-08 RX ADMIN — Medication 2 TABLET(S): at 21:01

## 2025-08-08 RX ADMIN — ATORVASTATIN CALCIUM 40 MILLIGRAM(S): 80 TABLET, FILM COATED ORAL at 21:01

## 2025-08-08 RX ADMIN — GABAPENTIN 300 MILLIGRAM(S): 400 CAPSULE ORAL at 05:04

## 2025-08-08 RX ADMIN — Medication 40 MILLIGRAM(S): at 05:04

## 2025-08-08 RX ADMIN — GABAPENTIN 200 MILLIGRAM(S): 400 CAPSULE ORAL at 21:01

## 2025-08-08 RX ADMIN — METOPROLOL SUCCINATE 100 MILLIGRAM(S): 50 TABLET, EXTENDED RELEASE ORAL at 05:04

## 2025-08-08 RX ADMIN — METHOCARBAMOL 500 MILLIGRAM(S): 500 TABLET, FILM COATED ORAL at 21:00

## 2025-08-08 RX ADMIN — METHOCARBAMOL 500 MILLIGRAM(S): 500 TABLET, FILM COATED ORAL at 05:04

## 2025-08-08 RX ADMIN — Medication 325 MILLIGRAM(S): at 11:12

## 2025-08-08 RX ADMIN — Medication 5 MILLIGRAM(S): at 21:01

## 2025-08-08 RX ADMIN — OXYCODONE HYDROCHLORIDE 10 MILLIGRAM(S): 30 TABLET ORAL at 22:00

## 2025-08-08 RX ADMIN — METOPROLOL SUCCINATE 50 MILLIGRAM(S): 50 TABLET, EXTENDED RELEASE ORAL at 17:31

## 2025-08-09 PROCEDURE — 99232 SBSQ HOSP IP/OBS MODERATE 35: CPT | Mod: GC

## 2025-08-09 PROCEDURE — 99232 SBSQ HOSP IP/OBS MODERATE 35: CPT

## 2025-08-09 RX ADMIN — METOPROLOL SUCCINATE 50 MILLIGRAM(S): 50 TABLET, EXTENDED RELEASE ORAL at 05:12

## 2025-08-09 RX ADMIN — METHOCARBAMOL 500 MILLIGRAM(S): 500 TABLET, FILM COATED ORAL at 21:34

## 2025-08-09 RX ADMIN — ATORVASTATIN CALCIUM 40 MILLIGRAM(S): 80 TABLET, FILM COATED ORAL at 21:34

## 2025-08-09 RX ADMIN — GABAPENTIN 200 MILLIGRAM(S): 400 CAPSULE ORAL at 05:11

## 2025-08-09 RX ADMIN — METHOCARBAMOL 500 MILLIGRAM(S): 500 TABLET, FILM COATED ORAL at 05:12

## 2025-08-09 RX ADMIN — Medication 5 MILLIGRAM(S): at 17:12

## 2025-08-09 RX ADMIN — GABAPENTIN 200 MILLIGRAM(S): 400 CAPSULE ORAL at 14:26

## 2025-08-09 RX ADMIN — GABAPENTIN 200 MILLIGRAM(S): 400 CAPSULE ORAL at 21:33

## 2025-08-09 RX ADMIN — OXYCODONE HYDROCHLORIDE 5 MILLIGRAM(S): 30 TABLET ORAL at 22:00

## 2025-08-09 RX ADMIN — APIXABAN 5 MILLIGRAM(S): 5 TABLET, FILM COATED ORAL at 05:12

## 2025-08-09 RX ADMIN — Medication 6 MILLIGRAM(S): at 21:36

## 2025-08-09 RX ADMIN — Medication 2 TABLET(S): at 21:34

## 2025-08-09 RX ADMIN — Medication 40 MILLIGRAM(S): at 05:12

## 2025-08-09 RX ADMIN — APIXABAN 5 MILLIGRAM(S): 5 TABLET, FILM COATED ORAL at 17:10

## 2025-08-09 RX ADMIN — OXYCODONE HYDROCHLORIDE 5 MILLIGRAM(S): 30 TABLET ORAL at 21:36

## 2025-08-09 RX ADMIN — Medication 325 MILLIGRAM(S): at 11:09

## 2025-08-09 RX ADMIN — METOPROLOL SUCCINATE 50 MILLIGRAM(S): 50 TABLET, EXTENDED RELEASE ORAL at 17:10

## 2025-08-10 PROCEDURE — 99232 SBSQ HOSP IP/OBS MODERATE 35: CPT | Mod: GC

## 2025-08-10 PROCEDURE — 99232 SBSQ HOSP IP/OBS MODERATE 35: CPT

## 2025-08-10 RX ADMIN — METHOCARBAMOL 500 MILLIGRAM(S): 500 TABLET, FILM COATED ORAL at 13:36

## 2025-08-10 RX ADMIN — Medication 325 MILLIGRAM(S): at 11:47

## 2025-08-10 RX ADMIN — METOPROLOL SUCCINATE 50 MILLIGRAM(S): 50 TABLET, EXTENDED RELEASE ORAL at 05:23

## 2025-08-10 RX ADMIN — Medication 2 TABLET(S): at 21:08

## 2025-08-10 RX ADMIN — GABAPENTIN 200 MILLIGRAM(S): 400 CAPSULE ORAL at 21:08

## 2025-08-10 RX ADMIN — Medication 6 MILLIGRAM(S): at 21:15

## 2025-08-10 RX ADMIN — METHOCARBAMOL 500 MILLIGRAM(S): 500 TABLET, FILM COATED ORAL at 21:08

## 2025-08-10 RX ADMIN — OXYCODONE HYDROCHLORIDE 10 MILLIGRAM(S): 30 TABLET ORAL at 21:14

## 2025-08-10 RX ADMIN — METHOCARBAMOL 500 MILLIGRAM(S): 500 TABLET, FILM COATED ORAL at 05:23

## 2025-08-10 RX ADMIN — GABAPENTIN 200 MILLIGRAM(S): 400 CAPSULE ORAL at 05:25

## 2025-08-10 RX ADMIN — GABAPENTIN 200 MILLIGRAM(S): 400 CAPSULE ORAL at 13:36

## 2025-08-10 RX ADMIN — ATORVASTATIN CALCIUM 40 MILLIGRAM(S): 80 TABLET, FILM COATED ORAL at 21:08

## 2025-08-10 RX ADMIN — Medication 40 MILLIGRAM(S): at 05:23

## 2025-08-10 RX ADMIN — APIXABAN 5 MILLIGRAM(S): 5 TABLET, FILM COATED ORAL at 18:10

## 2025-08-10 RX ADMIN — APIXABAN 5 MILLIGRAM(S): 5 TABLET, FILM COATED ORAL at 05:23

## 2025-08-10 RX ADMIN — METOPROLOL SUCCINATE 50 MILLIGRAM(S): 50 TABLET, EXTENDED RELEASE ORAL at 18:10

## 2025-08-10 RX ADMIN — OXYCODONE HYDROCHLORIDE 10 MILLIGRAM(S): 30 TABLET ORAL at 22:00

## 2025-08-11 LAB
ALBUMIN SERPL ELPH-MCNC: 3 G/DL — LOW (ref 3.3–5)
ALP SERPL-CCNC: 236 U/L — HIGH (ref 40–120)
ALT FLD-CCNC: 26 U/L — SIGNIFICANT CHANGE UP (ref 10–45)
ANION GAP SERPL CALC-SCNC: 10 MMOL/L — SIGNIFICANT CHANGE UP (ref 5–17)
AST SERPL-CCNC: 14 U/L — SIGNIFICANT CHANGE UP (ref 10–40)
BILIRUB SERPL-MCNC: 0.3 MG/DL — SIGNIFICANT CHANGE UP (ref 0.2–1.2)
BUN SERPL-MCNC: 26 MG/DL — HIGH (ref 7–23)
CALCIUM SERPL-MCNC: 9.3 MG/DL — SIGNIFICANT CHANGE UP (ref 8.4–10.5)
CHLORIDE SERPL-SCNC: 107 MMOL/L — SIGNIFICANT CHANGE UP (ref 96–108)
CO2 SERPL-SCNC: 27 MMOL/L — SIGNIFICANT CHANGE UP (ref 22–31)
CREAT SERPL-MCNC: 0.74 MG/DL — SIGNIFICANT CHANGE UP (ref 0.5–1.3)
EGFR: 83 ML/MIN/1.73M2 — SIGNIFICANT CHANGE UP
EGFR: 83 ML/MIN/1.73M2 — SIGNIFICANT CHANGE UP
GLUCOSE SERPL-MCNC: 114 MG/DL — HIGH (ref 70–99)
HCT VFR BLD CALC: 33.7 % — LOW (ref 34.5–45)
HGB BLD-MCNC: 10.5 G/DL — LOW (ref 11.5–15.5)
MCHC RBC-ENTMCNC: 26.8 PG — LOW (ref 27–34)
MCHC RBC-ENTMCNC: 31.2 G/DL — LOW (ref 32–36)
MCV RBC AUTO: 86 FL — SIGNIFICANT CHANGE UP (ref 80–100)
NRBC BLD AUTO-RTO: 0 /100 WBCS — SIGNIFICANT CHANGE UP (ref 0–0)
PLATELET # BLD AUTO: 294 K/UL — SIGNIFICANT CHANGE UP (ref 150–400)
POTASSIUM SERPL-MCNC: 3.8 MMOL/L — SIGNIFICANT CHANGE UP (ref 3.5–5.3)
POTASSIUM SERPL-SCNC: 3.8 MMOL/L — SIGNIFICANT CHANGE UP (ref 3.5–5.3)
PROT SERPL-MCNC: 6.3 G/DL — SIGNIFICANT CHANGE UP (ref 6–8.3)
RBC # BLD: 3.92 M/UL — SIGNIFICANT CHANGE UP (ref 3.8–5.2)
RBC # FLD: 16.5 % — HIGH (ref 10.3–14.5)
SODIUM SERPL-SCNC: 144 MMOL/L — SIGNIFICANT CHANGE UP (ref 135–145)
WBC # BLD: 4.91 K/UL — SIGNIFICANT CHANGE UP (ref 3.8–10.5)
WBC # FLD AUTO: 4.91 K/UL — SIGNIFICANT CHANGE UP (ref 3.8–10.5)

## 2025-08-11 PROCEDURE — 99232 SBSQ HOSP IP/OBS MODERATE 35: CPT

## 2025-08-11 RX ORDER — OXYCODONE HYDROCHLORIDE 30 MG/1
5 TABLET ORAL EVERY 4 HOURS
Refills: 0 | Status: DISCONTINUED | OUTPATIENT
Start: 2025-08-11 | End: 2025-08-14

## 2025-08-11 RX ORDER — OXYCODONE HYDROCHLORIDE 30 MG/1
10 TABLET ORAL EVERY 4 HOURS
Refills: 0 | Status: DISCONTINUED | OUTPATIENT
Start: 2025-08-11 | End: 2025-08-14

## 2025-08-11 RX ADMIN — Medication 6 MILLIGRAM(S): at 22:26

## 2025-08-11 RX ADMIN — GABAPENTIN 200 MILLIGRAM(S): 400 CAPSULE ORAL at 14:49

## 2025-08-11 RX ADMIN — METOPROLOL SUCCINATE 50 MILLIGRAM(S): 50 TABLET, EXTENDED RELEASE ORAL at 05:45

## 2025-08-11 RX ADMIN — OXYCODONE HYDROCHLORIDE 5 MILLIGRAM(S): 30 TABLET ORAL at 12:45

## 2025-08-11 RX ADMIN — OXYCODONE HYDROCHLORIDE 10 MILLIGRAM(S): 30 TABLET ORAL at 23:22

## 2025-08-11 RX ADMIN — APIXABAN 5 MILLIGRAM(S): 5 TABLET, FILM COATED ORAL at 18:04

## 2025-08-11 RX ADMIN — GABAPENTIN 200 MILLIGRAM(S): 400 CAPSULE ORAL at 22:19

## 2025-08-11 RX ADMIN — METHOCARBAMOL 500 MILLIGRAM(S): 500 TABLET, FILM COATED ORAL at 14:48

## 2025-08-11 RX ADMIN — Medication 40 MILLIGRAM(S): at 05:45

## 2025-08-11 RX ADMIN — OXYCODONE HYDROCHLORIDE 5 MILLIGRAM(S): 30 TABLET ORAL at 18:31

## 2025-08-11 RX ADMIN — Medication 2 TABLET(S): at 22:20

## 2025-08-11 RX ADMIN — GABAPENTIN 200 MILLIGRAM(S): 400 CAPSULE ORAL at 05:44

## 2025-08-11 RX ADMIN — METOPROLOL SUCCINATE 50 MILLIGRAM(S): 50 TABLET, EXTENDED RELEASE ORAL at 18:04

## 2025-08-11 RX ADMIN — APIXABAN 5 MILLIGRAM(S): 5 TABLET, FILM COATED ORAL at 05:45

## 2025-08-11 RX ADMIN — ATORVASTATIN CALCIUM 40 MILLIGRAM(S): 80 TABLET, FILM COATED ORAL at 22:18

## 2025-08-11 RX ADMIN — METHOCARBAMOL 500 MILLIGRAM(S): 500 TABLET, FILM COATED ORAL at 05:45

## 2025-08-11 RX ADMIN — METHOCARBAMOL 500 MILLIGRAM(S): 500 TABLET, FILM COATED ORAL at 22:19

## 2025-08-11 RX ADMIN — Medication 325 MILLIGRAM(S): at 12:00

## 2025-08-12 RX ADMIN — METHOCARBAMOL 500 MILLIGRAM(S): 500 TABLET, FILM COATED ORAL at 05:36

## 2025-08-12 RX ADMIN — APIXABAN 5 MILLIGRAM(S): 5 TABLET, FILM COATED ORAL at 17:51

## 2025-08-12 RX ADMIN — METOPROLOL SUCCINATE 50 MILLIGRAM(S): 50 TABLET, EXTENDED RELEASE ORAL at 17:51

## 2025-08-12 RX ADMIN — METHOCARBAMOL 500 MILLIGRAM(S): 500 TABLET, FILM COATED ORAL at 14:48

## 2025-08-12 RX ADMIN — GABAPENTIN 200 MILLIGRAM(S): 400 CAPSULE ORAL at 14:48

## 2025-08-12 RX ADMIN — METOPROLOL SUCCINATE 50 MILLIGRAM(S): 50 TABLET, EXTENDED RELEASE ORAL at 05:36

## 2025-08-12 RX ADMIN — GABAPENTIN 200 MILLIGRAM(S): 400 CAPSULE ORAL at 05:36

## 2025-08-12 RX ADMIN — OXYCODONE HYDROCHLORIDE 10 MILLIGRAM(S): 30 TABLET ORAL at 19:12

## 2025-08-12 RX ADMIN — Medication 325 MILLIGRAM(S): at 12:15

## 2025-08-12 RX ADMIN — OXYCODONE HYDROCHLORIDE 10 MILLIGRAM(S): 30 TABLET ORAL at 20:10

## 2025-08-12 RX ADMIN — APIXABAN 5 MILLIGRAM(S): 5 TABLET, FILM COATED ORAL at 05:36

## 2025-08-12 RX ADMIN — Medication 6 MILLIGRAM(S): at 21:14

## 2025-08-12 RX ADMIN — Medication 40 MILLIGRAM(S): at 05:36

## 2025-08-12 RX ADMIN — OXYCODONE HYDROCHLORIDE 10 MILLIGRAM(S): 30 TABLET ORAL at 00:16

## 2025-08-12 RX ADMIN — Medication 2 TABLET(S): at 21:15

## 2025-08-12 RX ADMIN — METHOCARBAMOL 500 MILLIGRAM(S): 500 TABLET, FILM COATED ORAL at 21:14

## 2025-08-12 RX ADMIN — ATORVASTATIN CALCIUM 40 MILLIGRAM(S): 80 TABLET, FILM COATED ORAL at 21:15

## 2025-08-12 RX ADMIN — GABAPENTIN 200 MILLIGRAM(S): 400 CAPSULE ORAL at 21:15

## 2025-08-12 RX ADMIN — Medication 5 MILLIGRAM(S): at 21:15

## 2025-08-13 ENCOUNTER — TRANSCRIPTION ENCOUNTER (OUTPATIENT)
Age: 75
End: 2025-08-13

## 2025-08-13 ENCOUNTER — RX RENEWAL (OUTPATIENT)
Age: 75
End: 2025-08-13

## 2025-08-13 PROCEDURE — 90832 PSYTX W PT 30 MINUTES: CPT

## 2025-08-13 RX ADMIN — GABAPENTIN 200 MILLIGRAM(S): 400 CAPSULE ORAL at 21:17

## 2025-08-13 RX ADMIN — Medication 2 TABLET(S): at 21:18

## 2025-08-13 RX ADMIN — OXYCODONE HYDROCHLORIDE 5 MILLIGRAM(S): 30 TABLET ORAL at 13:52

## 2025-08-13 RX ADMIN — METHOCARBAMOL 500 MILLIGRAM(S): 500 TABLET, FILM COATED ORAL at 13:17

## 2025-08-13 RX ADMIN — APIXABAN 5 MILLIGRAM(S): 5 TABLET, FILM COATED ORAL at 05:02

## 2025-08-13 RX ADMIN — ATORVASTATIN CALCIUM 40 MILLIGRAM(S): 80 TABLET, FILM COATED ORAL at 21:17

## 2025-08-13 RX ADMIN — Medication 975 MILLIGRAM(S): at 16:15

## 2025-08-13 RX ADMIN — METOPROLOL SUCCINATE 50 MILLIGRAM(S): 50 TABLET, EXTENDED RELEASE ORAL at 05:02

## 2025-08-13 RX ADMIN — METOPROLOL SUCCINATE 50 MILLIGRAM(S): 50 TABLET, EXTENDED RELEASE ORAL at 17:16

## 2025-08-13 RX ADMIN — OXYCODONE HYDROCHLORIDE 10 MILLIGRAM(S): 30 TABLET ORAL at 22:10

## 2025-08-13 RX ADMIN — OXYCODONE HYDROCHLORIDE 10 MILLIGRAM(S): 30 TABLET ORAL at 21:17

## 2025-08-13 RX ADMIN — Medication 6 MILLIGRAM(S): at 21:17

## 2025-08-13 RX ADMIN — Medication 5 MILLIGRAM(S): at 11:30

## 2025-08-13 RX ADMIN — GABAPENTIN 200 MILLIGRAM(S): 400 CAPSULE ORAL at 05:02

## 2025-08-13 RX ADMIN — Medication 40 MILLIGRAM(S): at 05:02

## 2025-08-13 RX ADMIN — Medication 975 MILLIGRAM(S): at 15:38

## 2025-08-13 RX ADMIN — METHOCARBAMOL 500 MILLIGRAM(S): 500 TABLET, FILM COATED ORAL at 05:02

## 2025-08-13 RX ADMIN — OXYCODONE HYDROCHLORIDE 5 MILLIGRAM(S): 30 TABLET ORAL at 14:30

## 2025-08-13 RX ADMIN — APIXABAN 5 MILLIGRAM(S): 5 TABLET, FILM COATED ORAL at 17:16

## 2025-08-13 RX ADMIN — GABAPENTIN 200 MILLIGRAM(S): 400 CAPSULE ORAL at 13:17

## 2025-08-13 RX ADMIN — Medication 325 MILLIGRAM(S): at 11:28

## 2025-08-14 ENCOUNTER — TRANSCRIPTION ENCOUNTER (OUTPATIENT)
Age: 75
End: 2025-08-14

## 2025-08-14 VITALS
DIASTOLIC BLOOD PRESSURE: 86 MMHG | TEMPERATURE: 99 F | SYSTOLIC BLOOD PRESSURE: 142 MMHG | OXYGEN SATURATION: 92 % | HEART RATE: 70 BPM | RESPIRATION RATE: 16 BRPM

## 2025-08-14 LAB
ALBUMIN SERPL ELPH-MCNC: 2.9 G/DL — LOW (ref 3.3–5)
ALP SERPL-CCNC: 219 U/L — HIGH (ref 40–120)
ALT FLD-CCNC: 29 U/L — SIGNIFICANT CHANGE UP (ref 10–45)
ANION GAP SERPL CALC-SCNC: 9 MMOL/L — SIGNIFICANT CHANGE UP (ref 5–17)
AST SERPL-CCNC: 16 U/L — SIGNIFICANT CHANGE UP (ref 10–40)
BILIRUB SERPL-MCNC: 0.4 MG/DL — SIGNIFICANT CHANGE UP (ref 0.2–1.2)
BUN SERPL-MCNC: 26 MG/DL — HIGH (ref 7–23)
CALCIUM SERPL-MCNC: 9 MG/DL — SIGNIFICANT CHANGE UP (ref 8.4–10.5)
CHLORIDE SERPL-SCNC: 105 MMOL/L — SIGNIFICANT CHANGE UP (ref 96–108)
CO2 SERPL-SCNC: 28 MMOL/L — SIGNIFICANT CHANGE UP (ref 22–31)
CREAT SERPL-MCNC: 0.88 MG/DL — SIGNIFICANT CHANGE UP (ref 0.5–1.3)
EGFR: 68 ML/MIN/1.73M2 — SIGNIFICANT CHANGE UP
EGFR: 68 ML/MIN/1.73M2 — SIGNIFICANT CHANGE UP
GLUCOSE SERPL-MCNC: 102 MG/DL — HIGH (ref 70–99)
HCT VFR BLD CALC: 32.5 % — LOW (ref 34.5–45)
HGB BLD-MCNC: 10 G/DL — LOW (ref 11.5–15.5)
MCHC RBC-ENTMCNC: 27 PG — SIGNIFICANT CHANGE UP (ref 27–34)
MCHC RBC-ENTMCNC: 30.8 G/DL — LOW (ref 32–36)
MCV RBC AUTO: 87.6 FL — SIGNIFICANT CHANGE UP (ref 80–100)
NRBC BLD AUTO-RTO: 0 /100 WBCS — SIGNIFICANT CHANGE UP (ref 0–0)
PLATELET # BLD AUTO: 272 K/UL — SIGNIFICANT CHANGE UP (ref 150–400)
POTASSIUM SERPL-MCNC: 4 MMOL/L — SIGNIFICANT CHANGE UP (ref 3.5–5.3)
POTASSIUM SERPL-SCNC: 4 MMOL/L — SIGNIFICANT CHANGE UP (ref 3.5–5.3)
PROT SERPL-MCNC: 6 G/DL — SIGNIFICANT CHANGE UP (ref 6–8.3)
RBC # BLD: 3.71 M/UL — LOW (ref 3.8–5.2)
RBC # FLD: 16.9 % — HIGH (ref 10.3–14.5)
SODIUM SERPL-SCNC: 142 MMOL/L — SIGNIFICANT CHANGE UP (ref 135–145)
WBC # BLD: 4.74 K/UL — SIGNIFICANT CHANGE UP (ref 3.8–10.5)
WBC # FLD AUTO: 4.74 K/UL — SIGNIFICANT CHANGE UP (ref 3.8–10.5)

## 2025-08-14 PROCEDURE — 92523 SPEECH SOUND LANG COMPREHEN: CPT | Mod: GN

## 2025-08-14 PROCEDURE — 97530 THERAPEUTIC ACTIVITIES: CPT | Mod: GO

## 2025-08-14 PROCEDURE — 97110 THERAPEUTIC EXERCISES: CPT | Mod: GO

## 2025-08-14 PROCEDURE — 36415 COLL VENOUS BLD VENIPUNCTURE: CPT

## 2025-08-14 PROCEDURE — 87635 SARS-COV-2 COVID-19 AMP PRB: CPT

## 2025-08-14 PROCEDURE — 97116 GAIT TRAINING THERAPY: CPT | Mod: GP

## 2025-08-14 PROCEDURE — 97535 SELF CARE MNGMENT TRAINING: CPT | Mod: GO

## 2025-08-14 PROCEDURE — 80053 COMPREHEN METABOLIC PANEL: CPT

## 2025-08-14 PROCEDURE — 85025 COMPLETE CBC W/AUTO DIFF WBC: CPT

## 2025-08-14 PROCEDURE — 97161 PT EVAL LOW COMPLEX 20 MIN: CPT | Mod: GP

## 2025-08-14 PROCEDURE — 97165 OT EVAL LOW COMPLEX 30 MIN: CPT | Mod: GO

## 2025-08-14 PROCEDURE — 92507 TX SP LANG VOICE COMM INDIV: CPT | Mod: GN

## 2025-08-14 PROCEDURE — 85027 COMPLETE CBC AUTOMATED: CPT

## 2025-08-14 RX ORDER — LIDOCAINE HYDROCHLORIDE 20 MG/ML
1 JELLY TOPICAL
Qty: 30 | Refills: 0
Start: 2025-08-14 | End: 2025-09-12

## 2025-08-14 RX ORDER — APIXABAN 5 MG/1
1 TABLET, FILM COATED ORAL
Qty: 60 | Refills: 0
Start: 2025-08-14 | End: 2025-09-12

## 2025-08-14 RX ORDER — LIDOCAINE HYDROCHLORIDE 20 MG/ML
1 JELLY TOPICAL
Qty: 6 | Refills: 0
Start: 2025-08-14 | End: 2025-09-12

## 2025-08-14 RX ORDER — MELATONIN 5 MG
2 TABLET ORAL
Qty: 0 | Refills: 0 | DISCHARGE
Start: 2025-08-14

## 2025-08-14 RX ORDER — PRAMIPEXOLE DIHYDROCHLORIDE 1 MG/1
1 TABLET ORAL
Qty: 30 | Refills: 0
Start: 2025-08-14 | End: 2025-09-12

## 2025-08-14 RX ORDER — CYCLOBENZAPRINE HYDROCHLORIDE 15 MG/1
1 CAPSULE, EXTENDED RELEASE ORAL
Qty: 90 | Refills: 0
Start: 2025-08-14 | End: 2025-09-12

## 2025-08-14 RX ORDER — ACETAMINOPHEN 500 MG/5ML
3 LIQUID (ML) ORAL
Qty: 0 | Refills: 0 | DISCHARGE
Start: 2025-08-14

## 2025-08-14 RX ORDER — METOPROLOL SUCCINATE 50 MG/1
1 TABLET, EXTENDED RELEASE ORAL
Qty: 60 | Refills: 0
Start: 2025-08-14 | End: 2025-09-12

## 2025-08-14 RX ORDER — LIDOCAINE HYDROCHLORIDE 20 MG/ML
1 JELLY TOPICAL
Qty: 0 | Refills: 0 | DISCHARGE
Start: 2025-08-14

## 2025-08-14 RX ORDER — ATORVASTATIN CALCIUM 80 MG/1
1 TABLET, FILM COATED ORAL
Qty: 30 | Refills: 0
Start: 2025-08-14 | End: 2025-09-12

## 2025-08-14 RX ORDER — HYDROCORTISONE 10 MG/G
1 CREAM TOPICAL
Qty: 0 | Refills: 0 | DISCHARGE
Start: 2025-08-14

## 2025-08-14 RX ORDER — FERROUS SULFATE 137(45) MG
1 TABLET, EXTENDED RELEASE ORAL
Qty: 0 | Refills: 0 | DISCHARGE
Start: 2025-08-14

## 2025-08-14 RX ORDER — ACETAMINOPHEN 500 MG/5ML
2 LIQUID (ML) ORAL
Qty: 0 | Refills: 0 | DISCHARGE

## 2025-08-14 RX ORDER — NALOXONE HYDROCHLORIDE 0.4 MG/ML
1 INJECTION, SOLUTION INTRAMUSCULAR; INTRAVENOUS; SUBCUTANEOUS
Qty: 1 | Refills: 0
Start: 2025-08-14 | End: 2025-08-15

## 2025-08-14 RX ORDER — METHOCARBAMOL 500 MG/1
1 TABLET, FILM COATED ORAL
Qty: 90 | Refills: 0
Start: 2025-08-14 | End: 2025-09-12

## 2025-08-14 RX ORDER — BISACODYL 5 MG
1 TABLET, DELAYED RELEASE (ENTERIC COATED) ORAL
Qty: 30 | Refills: 0
Start: 2025-08-14 | End: 2025-09-12

## 2025-08-14 RX ORDER — OXYCODONE HYDROCHLORIDE 30 MG/1
1 TABLET ORAL
Qty: 42 | Refills: 0
Start: 2025-08-14 | End: 2025-08-20

## 2025-08-14 RX ORDER — GABAPENTIN 400 MG/1
2 CAPSULE ORAL
Qty: 180 | Refills: 0
Start: 2025-08-14 | End: 2025-09-12

## 2025-08-14 RX ORDER — SENNA 187 MG
2 TABLET ORAL
Qty: 0 | Refills: 0 | DISCHARGE
Start: 2025-08-14

## 2025-08-14 RX ADMIN — GABAPENTIN 200 MILLIGRAM(S): 400 CAPSULE ORAL at 05:03

## 2025-08-14 RX ADMIN — GABAPENTIN 200 MILLIGRAM(S): 400 CAPSULE ORAL at 13:18

## 2025-08-14 RX ADMIN — APIXABAN 5 MILLIGRAM(S): 5 TABLET, FILM COATED ORAL at 05:03

## 2025-08-14 RX ADMIN — CYCLOBENZAPRINE HYDROCHLORIDE 5 MILLIGRAM(S): 15 CAPSULE, EXTENDED RELEASE ORAL at 13:17

## 2025-08-14 RX ADMIN — METOPROLOL SUCCINATE 50 MILLIGRAM(S): 50 TABLET, EXTENDED RELEASE ORAL at 05:03

## 2025-08-14 RX ADMIN — Medication 325 MILLIGRAM(S): at 13:16

## 2025-08-14 RX ADMIN — Medication 40 MILLIGRAM(S): at 05:03

## 2025-08-15 ENCOUNTER — RX RENEWAL (OUTPATIENT)
Age: 75
End: 2025-08-15

## 2025-08-19 ENCOUNTER — APPOINTMENT (OUTPATIENT)
Dept: CARDIOLOGY | Facility: CLINIC | Age: 75
End: 2025-08-19

## 2025-08-20 ENCOUNTER — APPOINTMENT (OUTPATIENT)
Dept: CARDIOLOGY | Facility: CLINIC | Age: 75
End: 2025-08-20
Payer: MEDICARE

## 2025-08-20 VITALS
SYSTOLIC BLOOD PRESSURE: 120 MMHG | DIASTOLIC BLOOD PRESSURE: 70 MMHG | OXYGEN SATURATION: 94 % | WEIGHT: 190 LBS | BODY MASS INDEX: 32.61 KG/M2 | HEART RATE: 70 BPM

## 2025-08-20 DIAGNOSIS — M25.471 EFFUSION, RIGHT ANKLE: ICD-10-CM

## 2025-08-20 DIAGNOSIS — M25.472 EFFUSION, RIGHT ANKLE: ICD-10-CM

## 2025-08-20 DIAGNOSIS — I10 ESSENTIAL (PRIMARY) HYPERTENSION: ICD-10-CM

## 2025-08-20 DIAGNOSIS — E78.5 HYPERLIPIDEMIA, UNSPECIFIED: ICD-10-CM

## 2025-08-20 DIAGNOSIS — Q21.12 PATENT FORAMEN OVALE: ICD-10-CM

## 2025-08-20 DIAGNOSIS — I63.433 CEREBRAL INFARCTION DUE TO EMBOLISM OF BILATERAL POSTERIOR CEREBRAL ARTERIES: ICD-10-CM

## 2025-08-20 DIAGNOSIS — I48.0 PAROXYSMAL ATRIAL FIBRILLATION: ICD-10-CM

## 2025-08-20 PROCEDURE — 99215 OFFICE O/P EST HI 40 MIN: CPT

## 2025-08-20 PROCEDURE — 93000 ELECTROCARDIOGRAM COMPLETE: CPT

## 2025-08-20 RX ORDER — GABAPENTIN 100 MG/1
100 CAPSULE ORAL
Qty: 90 | Refills: 5 | Status: ACTIVE | COMMUNITY
Start: 2025-08-20

## 2025-08-20 RX ORDER — METHOCARBAMOL 500 MG/1
500 TABLET, FILM COATED ORAL EVERY 8 HOURS
Qty: 90 | Refills: 0 | Status: ACTIVE | COMMUNITY
Start: 2025-08-20 | End: 1900-01-01

## 2025-08-21 ENCOUNTER — APPOINTMENT (OUTPATIENT)
Dept: PULMONOLOGY | Facility: CLINIC | Age: 75
End: 2025-08-21

## 2025-08-21 ENCOUNTER — APPOINTMENT (OUTPATIENT)
Dept: NEUROSURGERY | Facility: CLINIC | Age: 75
End: 2025-08-21
Payer: MEDICARE

## 2025-08-21 VITALS
SYSTOLIC BLOOD PRESSURE: 169 MMHG | DIASTOLIC BLOOD PRESSURE: 79 MMHG | WEIGHT: 190 LBS | BODY MASS INDEX: 32.44 KG/M2 | HEIGHT: 64 IN | OXYGEN SATURATION: 94 % | TEMPERATURE: 98.2 F | HEART RATE: 69 BPM

## 2025-08-21 VITALS
TEMPERATURE: 98.2 F | HEART RATE: 69 BPM | DIASTOLIC BLOOD PRESSURE: 80 MMHG | SYSTOLIC BLOOD PRESSURE: 180 MMHG | OXYGEN SATURATION: 94 %

## 2025-08-21 DIAGNOSIS — Z98.1 ARTHRODESIS STATUS: ICD-10-CM

## 2025-08-21 PROCEDURE — 99024 POSTOP FOLLOW-UP VISIT: CPT

## 2025-09-04 ENCOUNTER — APPOINTMENT (OUTPATIENT)
Dept: ORTHOPEDIC SURGERY | Facility: CLINIC | Age: 75
End: 2025-09-04
Payer: MEDICARE

## 2025-09-04 VITALS — HEIGHT: 64 IN | BODY MASS INDEX: 32.44 KG/M2 | WEIGHT: 190 LBS

## 2025-09-04 DIAGNOSIS — M25.551 PAIN IN RIGHT HIP: ICD-10-CM

## 2025-09-04 DIAGNOSIS — Z96.643 PRESENCE OF ARTIFICIAL HIP JOINT, BILATERAL: ICD-10-CM

## 2025-09-04 DIAGNOSIS — Z96.641 PRESENCE OF RIGHT ARTIFICIAL HIP JOINT: ICD-10-CM

## 2025-09-04 DIAGNOSIS — M70.61 TROCHANTERIC BURSITIS, RIGHT HIP: ICD-10-CM

## 2025-09-04 PROCEDURE — 73522 X-RAY EXAM HIPS BI 3-4 VIEWS: CPT

## 2025-09-04 PROCEDURE — 99214 OFFICE O/P EST MOD 30 MIN: CPT

## 2025-09-04 PROCEDURE — G2211 COMPLEX E/M VISIT ADD ON: CPT

## 2025-09-10 ENCOUNTER — APPOINTMENT (OUTPATIENT)
Dept: PLASTIC SURGERY | Facility: CLINIC | Age: 75
End: 2025-09-10

## 2025-09-10 ENCOUNTER — APPOINTMENT (OUTPATIENT)
Dept: ORTHOPEDIC SURGERY | Facility: CLINIC | Age: 75
End: 2025-09-10
Payer: MEDICARE

## 2025-09-10 VITALS — HEIGHT: 64 IN | BODY MASS INDEX: 32.44 KG/M2 | WEIGHT: 190 LBS

## 2025-09-10 DIAGNOSIS — M54.16 RADICULOPATHY, LUMBAR REGION: ICD-10-CM

## 2025-09-10 DIAGNOSIS — M40.209 UNSPECIFIED KYPHOSIS, SITE UNSPECIFIED: ICD-10-CM

## 2025-09-10 PROCEDURE — 99214 OFFICE O/P EST MOD 30 MIN: CPT

## 2025-09-10 PROCEDURE — 72082 X-RAY EXAM ENTIRE SPI 2/3 VW: CPT

## 2025-09-15 ENCOUNTER — APPOINTMENT (OUTPATIENT)
Dept: WOUND CARE | Facility: HOSPITAL | Age: 75
End: 2025-09-15
Payer: MEDICARE

## 2025-09-15 ENCOUNTER — APPOINTMENT (OUTPATIENT)
Dept: NEUROLOGY | Facility: CLINIC | Age: 75
End: 2025-09-15
Payer: MEDICARE

## 2025-09-15 ENCOUNTER — APPOINTMENT (OUTPATIENT)
Dept: ENDOCRINOLOGY | Facility: CLINIC | Age: 75
End: 2025-09-15

## 2025-09-15 VITALS
HEART RATE: 78 BPM | SYSTOLIC BLOOD PRESSURE: 168 MMHG | DIASTOLIC BLOOD PRESSURE: 84 MMHG | TEMPERATURE: 98.6 F | OXYGEN SATURATION: 94 % | RESPIRATION RATE: 16 BRPM

## 2025-09-15 VITALS
DIASTOLIC BLOOD PRESSURE: 60 MMHG | WEIGHT: 190 LBS | HEART RATE: 78 BPM | SYSTOLIC BLOOD PRESSURE: 132 MMHG | OXYGEN SATURATION: 94 % | BODY MASS INDEX: 32.44 KG/M2 | HEIGHT: 64 IN

## 2025-09-15 DIAGNOSIS — F43.20 ADJUSTMENT DISORDER, UNSPECIFIED: ICD-10-CM

## 2025-09-15 DIAGNOSIS — M20.40 OTHER HAMMER TOE(S) (ACQUIRED), UNSPECIFIED FOOT: ICD-10-CM

## 2025-09-15 DIAGNOSIS — L03.032 CELLULITIS OF LEFT TOE: ICD-10-CM

## 2025-09-15 DIAGNOSIS — M79.672 PAIN IN LEFT FOOT: ICD-10-CM

## 2025-09-15 DIAGNOSIS — B35.1 TINEA UNGUIUM: ICD-10-CM

## 2025-09-15 DIAGNOSIS — L60.0 INGROWING NAIL: ICD-10-CM

## 2025-09-15 PROCEDURE — 99203 OFFICE O/P NEW LOW 30 MIN: CPT | Mod: 25

## 2025-09-15 PROCEDURE — 11730 AVULSION NAIL PLATE SIMPLE 1: CPT | Mod: TA

## 2025-09-15 PROCEDURE — 99215 OFFICE O/P EST HI 40 MIN: CPT

## 2025-09-15 RX ORDER — MUPIROCIN 20 MG/G
2 OINTMENT TOPICAL TWICE DAILY
Qty: 1 | Refills: 0 | Status: ACTIVE | COMMUNITY
Start: 2025-09-15 | End: 1900-01-01

## (undated) DEVICE — MISONIX BONESCALPEL BLUNT BLADE & TUBESET 20MM

## (undated) DEVICE — GLV 8 RADIATION

## (undated) DEVICE — PACK CV SPLIT PACK II

## (undated) DEVICE — DRAPE MAYO STAND 30"

## (undated) DEVICE — SOL IRR POUR NS 0.9% 1500ML

## (undated) DEVICE — LAP PAD 18 X 18"

## (undated) DEVICE — SOL BETADINE POUCH 0.75OZ STERILE

## (undated) DEVICE — SUT BIOSYN 4-0 18" P-12

## (undated) DEVICE — ELCTR BOVIE TIP BLADE INSULATED 4" EDGE

## (undated) DEVICE — DRAIN JACKSON PRATT 7MM FLAT FULL NO TROCAR

## (undated) DEVICE — TUBING SUCTION 20FT

## (undated) DEVICE — SYR ALLIANCE II INFLATION 60ML

## (undated) DEVICE — SPONGE DISSECTOR PEANUT

## (undated) DEVICE — ELCTR SUBDERMAL NDL 27G X 1/2" WITH TWISTED PAIR

## (undated) DEVICE — ELCTR REM POLYHESIVE ADULT PT RETURN 15FT

## (undated) DEVICE — VENODYNE/SCD SLEEVE CALF MEDIUM

## (undated) DEVICE — POSITIONER FOAM EGG CRATE ULNAR 2PCS (PINK)

## (undated) DEVICE — CLAMP BX HOT RAD JAW 3

## (undated) DEVICE — LIJ-METRX INSTRUMENT TRAY: Type: DURABLE MEDICAL EQUIPMENT

## (undated) DEVICE — WARMING BLANKET LOWER ADULT

## (undated) DEVICE — POSITIONER CUSHION INSERT PRONE VIEW LG

## (undated) DEVICE — SUT VICRYL PLUS 2-0 18" CP-2 UNDYED (POP-OFF)

## (undated) DEVICE — FRAZIER SUCTION TIP 18FR

## (undated) DEVICE — SOL IRR POUR H2O 250ML

## (undated) DEVICE — SOL INJ NS 0.9% 1000ML

## (undated) DEVICE — TUBING TUR 2 PRONG

## (undated) DEVICE — SOL INJ NS 0.9% 500ML 1-PORT

## (undated) DEVICE — SUT QUILL MONODERM 0 1/2 CIRCLE TAPR 45CM 26MM

## (undated) DEVICE — CANISTER DISPOSABLE THIN WALL 3000CC

## (undated) DEVICE — DRSG CURITY GAUZE SPONGE 4 X 4" 12-PLY NON-STERILE

## (undated) DEVICE — PACK BASIN SPECIAL PROCEDURE

## (undated) DEVICE — SUT VICRYL PLUS 0 18" OS-6 (POP-OFF)

## (undated) DEVICE — STRYKER BONE MILL & MEDIUM BLADE

## (undated) DEVICE — GLV 7.5 PROTEXIS (WHITE)

## (undated) DEVICE — MEDICATION LABELS W MARKER

## (undated) DEVICE — TAPE SILK 3"

## (undated) DEVICE — DRAIN PENROSE .5" X 18" LATEX

## (undated) DEVICE — SUT BIOSYN 3-0 18" P-12

## (undated) DEVICE — SYR ASEPTO

## (undated) DEVICE — Device

## (undated) DEVICE — ELCTR AQUAMANTYS BIPOLAR SEALER 6.0

## (undated) DEVICE — DRAPE INSTRUMENT POUCH 6.75" X 11"

## (undated) DEVICE — PACK CARDIOVASCULAR UNIVERSAL

## (undated) DEVICE — DRSG DERMABOND 0.7ML

## (undated) DEVICE — DRAPE FEMORAL ANGIOGRAPHY W TROUGH

## (undated) DEVICE — CATH IV SAFE BC 20G X 1.16" (PINK)

## (undated) DEVICE — DRAPE IOBAN 23" X 23"

## (undated) DEVICE — ELCTR SUBDERMAL CORKSCREW NDL 1.2MM

## (undated) DEVICE — PACK IV START WITH CHG

## (undated) DEVICE — SOL IRR POUR NS 0.9% 500ML

## (undated) DEVICE — BALLOON US ENDO

## (undated) DEVICE — FOLEY HOLDER STATLOCK 2 WAY ADULT

## (undated) DEVICE — MARKING PEN W RULER

## (undated) DEVICE — GLV 8.5 PROTEXIS (WHITE)

## (undated) DEVICE — GOWN TRIMAX LG

## (undated) DEVICE — DRAPE 3/4 SHEET W REINFORCEMENT 56X77"

## (undated) DEVICE — ELCTR 4-DISC 20MM 49" (RED, BLUE, GREEN, BLACK)

## (undated) DEVICE — POSITIONER JACKSON TABLE CHEST, HIP, THIGH PADS, ARM CRADLE

## (undated) DEVICE — CATH IV SAFE BC 22G X 1" (BLUE)

## (undated) DEVICE — SUT MONOSOF 3-0 18" C-14

## (undated) DEVICE — SOL NORMOSOL-R PH7.4 1000ML

## (undated) DEVICE — DRSG STERISTRIPS 0.5 X 4"

## (undated) DEVICE — DRAIN JACKSON PRATT 3 SPRING RESERVOIR W 10FR PVC DRAIN

## (undated) DEVICE — CONNECTOR STRAIGHT 3/8 X 3/8" W LUER LOCK

## (undated) DEVICE — TUBING IV SET GRAVITY 3Y 100" MACRO

## (undated) DEVICE — SOL IRR POUR H2O 500ML

## (undated) DEVICE — SPECIMEN CONTAINER 100ML

## (undated) DEVICE — DRAPE C ARM UNIVERSAL

## (undated) DEVICE — ELCTR SUBDERMAL NDL CLASSIC 1.5M X 59" (6 COLOR)

## (undated) DEVICE — SYR LUER LOK 5CC

## (undated) DEVICE — TUBING BIPOLAR IRRIGATOR AND CORD SET

## (undated) DEVICE — NDL HYPO SAFE 18G X 1.5" (PINK)

## (undated) DEVICE — VISITEC 4X4

## (undated) DEVICE — SOL IRR POUR H2O 1000ML

## (undated) DEVICE — SUCTION YANKAUER NO CONTROL VENT

## (undated) DEVICE — KNIFE ELECTROSURGICAL 4X0.7MM 165CM

## (undated) DEVICE — DRAPE BACK TABLE COVER 44X90"

## (undated) DEVICE — CONTAINER FORMALIN 80ML YELLOW

## (undated) DEVICE — PREP CHLORAPREP HI-LITE ORANGE 26ML

## (undated) DEVICE — SUT MONOCRYL 3-0 18" PS-1

## (undated) DEVICE — NDL PAK BEVEL

## (undated) DEVICE — GLV 8 PROTEXIS (CREAM) NEU-THERA

## (undated) DEVICE — DRILL BIT STRYKER ORTHO 4X25MM

## (undated) DEVICE — SUT MONOCRYL 3-0 18" PS-2 UNDYED

## (undated) DEVICE — GLV 8 PROTEXIS (WHITE)

## (undated) DEVICE — FOLEY TRAY 16FR LF URINE METER SURESTEP

## (undated) DEVICE — ELCTR BOVIE PENCIL HANDPIECE ROCKER SWITCH 15FT

## (undated) DEVICE — BIOPSY FORCEP RADIAL JAW 4 STANDARD WITH NEEDLE

## (undated) DEVICE — DRAPE MAGNETIC INSTRUMENT MEDIUM

## (undated) DEVICE — TUBING SUCTION CONN 6FT STERILE

## (undated) DEVICE — SUT STRATAFIX SYMMETRIC PDS PLUS 1 18" CTX VIOLET

## (undated) DEVICE — SUT PROLENE 4-0 36" BB

## (undated) DEVICE — BASIN EMESIS 10IN GRADUATED MAUVE

## (undated) DEVICE — BLADE SCALPEL SAFETYLOCK #20

## (undated) DEVICE — MAKO DRAPE KIT

## (undated) DEVICE — DRAPE TOWEL BLUE 17" X 24"

## (undated) DEVICE — CONNECTOR STRAIGHT 3/8 X 1/2"

## (undated) DEVICE — DRAIN RESERVOIR FOR JACKSON PRATT 100CC CARDINAL

## (undated) DEVICE — SOL IRR BAG NS 0.9% 1000ML

## (undated) DEVICE — SYR LUER LOK 20CC

## (undated) DEVICE — MIDAS REX MR8 MATCH HEAD FLUTED LG BORE 3MM X 14CM

## (undated) DEVICE — GLV 6.5 PROTEXIS (WHITE)

## (undated) DEVICE — INSUFFLATION NDL COVIDIEN STEP 14G FOR STEP/VERSASTEP

## (undated) DEVICE — LABELS BLANK W PEN

## (undated) DEVICE — DRAPE SURGICAL #1010

## (undated) DEVICE — ELCTR GROUNDING PAD ADULT COVIDIEN

## (undated) DEVICE — DRSG THROMBI DISC HEMOSTAT

## (undated) DEVICE — WARMING BLANKET UPPER ADULT

## (undated) DEVICE — STAPLER SKIN VISI-STAT 35 WIDE

## (undated) DEVICE — SPHERE MARKER (5 SPHERES)

## (undated) DEVICE — ELCTR MONOPOLAR STIMULATOR PROBE FLUSH-TIP

## (undated) DEVICE — PACK DAA HIP

## (undated) DEVICE — DRAPE C ARM 41X74"

## (undated) DEVICE — DRAPE LIGHT HANDLE COVER (GREEN)

## (undated) DEVICE — ELCTR BOVIE PENCIL HANDPIECE

## (undated) DEVICE — DRSG OPSITE 2.5 X 2"

## (undated) DEVICE — SUT VICRYL 3-0 18" SH UNDYED (POP-OFF)

## (undated) DEVICE — HOOD FLYTE STRYKER SURGICOOL W PEELAWAY

## (undated) DEVICE — WARMING BLANKET DUO-THERM HYPER/HYPOTHERM ADULT

## (undated) DEVICE — VESSEL LOOP MAXI-RED  0.120" X 16"

## (undated) DEVICE — MIDAS REX LEGEND BALL FLUTED SM BORE 4.0MM X 10CM

## (undated) DEVICE — SUT PDS II 0 36" CT-1

## (undated) DEVICE — DRSG CURITY GAUZE SPONGE 4 X 4" 12-PLY

## (undated) DEVICE — DRSG TAPE HYPAFIX 4"

## (undated) DEVICE — BIPOLAR FORCEP STRYKER STANDARD 8" X 1MM (YELLOW)

## (undated) DEVICE — DRSG TEGADERM 6"X8"

## (undated) DEVICE — BITE BLOCK ADULT 20 X 27MM (GREEN)

## (undated) DEVICE — ELCTR HEX BLADE

## (undated) DEVICE — DRAPE 3/4 SHEET 52X76"

## (undated) DEVICE — ATTACH DISTAL

## (undated) DEVICE — SUT SILK 0 30" TIES

## (undated) DEVICE — SUT TICRON 5 30" KV-40

## (undated) DEVICE — SUT ETHIBOND EXCEL 2 30" OS-4

## (undated) DEVICE — SUT POLYSORB 2-0 30" GS-21 UNDYED

## (undated) DEVICE — DENTURE CUP PINK

## (undated) DEVICE — ELCTR BOVIE TIP BLADE VALLEYLAB 6.5"

## (undated) DEVICE — ENDOCATCH II 15MM

## (undated) DEVICE — SOL INJ NS 0.9% 500ML 2 PORT

## (undated) DEVICE — SALIVA EJECTOR (BLUE)

## (undated) DEVICE — FOLEY TRAY 16FR 5CC LF UMETER CLOSED

## (undated) DEVICE — GOWN SLEEVES

## (undated) DEVICE — DRSG 2X2

## (undated) DEVICE — SUT RETRIEVER S&N LAVENDER

## (undated) DEVICE — ELCTR BIPOLAR PROBE

## (undated) DEVICE — SUT VICRYL 2-0 18" CT-2 (POP-OFF)

## (undated) DEVICE — VENODYNE/SCD SLEEVE CALF LARGE

## (undated) DEVICE — GLV 6.5 PROTEXIS (BLUE)

## (undated) DEVICE — LIGASURE MARYLAND 44CM

## (undated) DEVICE — NDL HYPO SAFE 21G X 1.5" (GREEN)

## (undated) DEVICE — MAKO VIZADISC HIP PROCEDURE TRACKING KIT

## (undated) DEVICE — SHEARS COVIDIEN ENDO SHEAR 5MM X 45CM LONG W MONOPOLAR CAUTERY

## (undated) DEVICE — SUT PDO 2 1/2 CIRCLE 40MM NDL 45CM

## (undated) DEVICE — BIOPSY FORCEP COLD DISP

## (undated) DEVICE — SUT POLYSORB 0 60" TIES UNDYED

## (undated) DEVICE — LINE BREATHE SAMPLNG

## (undated) DEVICE — GLV 8 PROTEGRITY

## (undated) DEVICE — DRSG AQUACEL 3.5 X 10"

## (undated) DEVICE — DRSG XEROFORM 1 X 8"

## (undated) DEVICE — MIDAS REX LEGEND MATCH HEAD FLUTED LG BORE 3.0MM X 14CM

## (undated) DEVICE — NDL HYPO SAFE 22G X 1.5" (BLACK)

## (undated) DEVICE — GLV 6 PROTEXIS (WHITE)

## (undated) DEVICE — GOWN TRIMAX XXL

## (undated) DEVICE — SYR LUER LOK 50CC

## (undated) DEVICE — STRYKER INTERPULSE HANDPIECE W IRR SUCTION TUBE

## (undated) DEVICE — LUBRICATING JELLY HR ONE SHOT 3G

## (undated) DEVICE — DRAPE IOBAN 33" X 23"

## (undated) DEVICE — SUT POLYSORB 0 36" GU-46

## (undated) DEVICE — SUT ETHIBOND 0 44" EN3

## (undated) DEVICE — SUT STRATAFIX SPIRAL MONOCRYL PLUS 3-0 19" PS-2 UNDYED

## (undated) DEVICE — TROCAR COVIDIEN VERSASTEP PLUS 11MM STANDARD

## (undated) DEVICE — ELCTR BOVIE TIP BLADE INSULATED 2.75" EDGE

## (undated) DEVICE — DRAPE EQUIPMENT BANDED BAG 30 X 30" (SHOWER CAP)

## (undated) DEVICE — PROBE HYBRIDKNIFE T TYPE OD 2.3MMX1.9M

## (undated) DEVICE — TOURNIQUET SET 12FR (1 RED, 1 BLUE, 1 SNARE) 7"

## (undated) DEVICE — DRAPE C ARM C-ARMOUR

## (undated) DEVICE — ELCTR ECG CONDUCTIVE ADHESIVE

## (undated) DEVICE — ELCTR PEDICLE SCREW PROBE 3MM BALL 1.8MM X 100MM

## (undated) DEVICE — ELCTR BOVIE PENCIL SMOKE EVACUATION

## (undated) DEVICE — DRSG BANDAID 0.75X3"

## (undated) DEVICE — UNDERPAD LINEN SAVER 17 X 24"

## (undated) DEVICE — PREP DURAPREP 26CC

## (undated) DEVICE — SUT SOFSILK 0 30" V-20

## (undated) DEVICE — D HELP - CLEARVIEW CLEARIFY SYSTEM

## (undated) DEVICE — SENSOR O2 FINGER ADULT

## (undated) DEVICE — SUT PROLENE 5-0 36" RB-1

## (undated) DEVICE — SAW BLADE MICROAIRE STERNUM 1.1X50X42MM

## (undated) DEVICE — GLV 7 PROTEXIS (WHITE)

## (undated) DEVICE — TROCAR COVIDIEN VERSASTEP PLUS 12MM STANDARD

## (undated) DEVICE — DRAIN JACKSON PRATT 10MM FLAT FULL NO TROCAR

## (undated) DEVICE — PACK NEURO

## (undated) DEVICE — DRAPE COVER SNAP 36X30"

## (undated) DEVICE — BLADE SCALPEL SAFETYLOCK #10

## (undated) DEVICE — GOWN LG

## (undated) DEVICE — SUT PROLENE 5-0 30" RB-2

## (undated) DEVICE — FOLEY HOLDER STATLOCK 3 WAY ADULT

## (undated) DEVICE — FOLEY TRAY 16FR 5CC LF LUBRISIL ADVANCE TEMP CLOSED

## (undated) DEVICE — TUBING FOR SMOKE EVACUATOR (PURPLE END)

## (undated) DEVICE — BLADE SURGICAL #10 STAINLESS

## (undated) DEVICE — SUT VICRYL PLUS 0 18" CT-1 UNDYED (POP-OFF)

## (undated) DEVICE — TUBING INSUFFLATION LAP FILTER 10FT

## (undated) DEVICE — DRSG TELFA 3 X 8

## (undated) DEVICE — DRAPE 1/2 SHEET 40X57"

## (undated) DEVICE — PACK LUMBAR LAMI

## (undated) DEVICE — SUT QUILL MONODERM 2-0 3/8 CIRCLE 45CM

## (undated) DEVICE — SAW BLADE STRYKER RECIPROCATING 77.6X0.77X11.2MM

## (undated) DEVICE — MISONIX BONESCALPEL DIAMOND SHAVER & TUBESET

## (undated) DEVICE — DRAPE STERILE-Z PATIENT

## (undated) DEVICE — WOUND IRR SURGIPHOR

## (undated) DEVICE — PACK UNIVERSAL CARDIAC

## (undated) DEVICE — TUBING STRYKEFLOW II SUCTION / IRRIGATOR

## (undated) DEVICE — GOWN SMARTGOWN RAGLAN XLG

## (undated) DEVICE — GLV 7.5 PROTEXIS (BLUE)

## (undated) DEVICE — DRSG OPSITE 13.75 X 4"

## (undated) DEVICE — SUT MONOCRYL 2-0 27" SH UNDYED

## (undated) DEVICE — SUT VICRYL 1 27" CPX UNDYED

## (undated) DEVICE — TUBING MEDI-VAC W MAXIGRIP CONNECTORS 1/4"X6'

## (undated) DEVICE — LIJ-KMEDIC KERRISON RONGUER: Type: DURABLE MEDICAL EQUIPMENT

## (undated) DEVICE — BLADE SCALPEL SAFETYLOCK #15

## (undated) DEVICE — TROCAR COVIDIEN VERSASTEP PLUS 15MM STANDARD

## (undated) DEVICE — HOOD FLYTE STRYKER HELMET SHIELD

## (undated) DEVICE — PACK UNIVERSAL CARDIAC SUPPLEMNTAL B

## (undated) DEVICE — ELCTR BOVIE PENCIL BLADE 10FT

## (undated) DEVICE — BLADE SCALPEL SAFETYLOCK #11

## (undated) DEVICE — GLV 8 PROTEXIS (BLUE)

## (undated) DEVICE — WARMING BLANKET FULL UNDERBODY

## (undated) DEVICE — SAW BLADE MICROAIRE STERNUM 1X34X9.4MM

## (undated) DEVICE — PACK ADVANCED LAPAROSCOPIC NS

## (undated) DEVICE — NDL SPINAL 18G X 3.5" (PINK)

## (undated) DEVICE — TROCAR COVIDIEN VERSAPORT BLADELESS OPTICAL 5MM STANDARD